# Patient Record
Sex: MALE | Race: ASIAN | NOT HISPANIC OR LATINO | Employment: OTHER | URBAN - METROPOLITAN AREA
[De-identification: names, ages, dates, MRNs, and addresses within clinical notes are randomized per-mention and may not be internally consistent; named-entity substitution may affect disease eponyms.]

---

## 2017-02-09 LAB
LEFT EYE DM RETINOPATHY: NEGATIVE
RIGHT EYE DM RETINOPATHY: POSITIVE

## 2017-08-02 ENCOUNTER — OFFICE VISIT (OUTPATIENT)
Dept: OTOLARYNGOLOGY | Facility: CLINIC | Age: 67
End: 2017-08-02
Payer: MEDICARE

## 2017-08-02 ENCOUNTER — CLINICAL SUPPORT (OUTPATIENT)
Dept: AUDIOLOGY | Facility: CLINIC | Age: 67
End: 2017-08-02
Payer: MEDICARE

## 2017-08-02 VITALS
WEIGHT: 166.44 LBS | SYSTOLIC BLOOD PRESSURE: 160 MMHG | HEART RATE: 60 BPM | TEMPERATURE: 97 F | HEIGHT: 68 IN | DIASTOLIC BLOOD PRESSURE: 91 MMHG | BODY MASS INDEX: 25.23 KG/M2

## 2017-08-02 DIAGNOSIS — H92.03 EAR DISCOMFORT, BILATERAL: ICD-10-CM

## 2017-08-02 DIAGNOSIS — H90.3 SENSORINEURAL HEARING LOSS, BILATERAL: Primary | ICD-10-CM

## 2017-08-02 DIAGNOSIS — H90.3 SENSORINEURAL HEARING LOSS, BILATERAL: ICD-10-CM

## 2017-08-02 DIAGNOSIS — Z87.898 HISTORY OF SNORING: ICD-10-CM

## 2017-08-02 DIAGNOSIS — Z77.122 HISTORY OF EXPOSURE TO NOISE: ICD-10-CM

## 2017-08-02 DIAGNOSIS — R09.82 POST-NASAL DRIP: ICD-10-CM

## 2017-08-02 DIAGNOSIS — R42 DIZZINESS: Primary | ICD-10-CM

## 2017-08-02 DIAGNOSIS — R42 VERTIGO: ICD-10-CM

## 2017-08-02 DIAGNOSIS — Z87.19 HISTORY OF GASTROESOPHAGEAL REFLUX (GERD): ICD-10-CM

## 2017-08-02 PROCEDURE — 99203 OFFICE O/P NEW LOW 30 MIN: CPT | Mod: S$GLB,,, | Performed by: OTOLARYNGOLOGY

## 2017-08-02 PROCEDURE — 99999 PR PBB SHADOW E&M-EST. PATIENT-LVL I: CPT | Mod: PBBFAC,,,

## 2017-08-02 PROCEDURE — 92557 COMPREHENSIVE HEARING TEST: CPT | Mod: S$GLB,,, | Performed by: AUDIOLOGIST

## 2017-08-02 PROCEDURE — 99999 PR PBB SHADOW E&M-EST. PATIENT-LVL III: CPT | Mod: PBBFAC,,, | Performed by: OTOLARYNGOLOGY

## 2017-08-02 PROCEDURE — 92550 TYMPANOMETRY & REFLEX THRESH: CPT | Mod: S$GLB,,, | Performed by: AUDIOLOGIST

## 2017-08-02 RX ORDER — LEVOCETIRIZINE DIHYDROCHLORIDE 5 MG/1
TABLET, FILM COATED ORAL
COMMUNITY
Start: 2017-06-20 | End: 2024-02-16

## 2017-08-02 RX ORDER — METFORMIN HYDROCHLORIDE 1000 MG/1
TABLET ORAL
COMMUNITY
Start: 2017-06-20 | End: 2023-04-30 | Stop reason: SDUPTHER

## 2017-08-02 NOTE — PATIENT INSTRUCTIONS
Trial of Suleman Med rinse kit with distilled water encouraged  Anti GERD literature provided  Continue use of Singulair and Flonase NSS; 1-2 sprays @ lateral nostril once a day  Hydration, plain Mucinex twice a day may help thin secretions  Audiometry reviewed: signifcnat high frequency SNHL  Pt. is a candidate for hearing amplification for one or both ears   copy of audiogram/YUMIKO Holman's card provided  Pt. will schedule for complete VNG scheduled at convenience; vertigo workup literature provided  Office endoscopy on return prn  Consider sleep study;  r/o apnea pending course
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
## 2017-08-10 LAB
LEFT EYE DM RETINOPATHY: NEGATIVE
RIGHT EYE DM RETINOPATHY: POSITIVE

## 2017-09-25 ENCOUNTER — CLINICAL SUPPORT (OUTPATIENT)
Dept: AUDIOLOGY | Facility: CLINIC | Age: 67
End: 2017-09-25
Payer: MEDICARE

## 2017-09-25 ENCOUNTER — OFFICE VISIT (OUTPATIENT)
Dept: OTOLARYNGOLOGY | Facility: CLINIC | Age: 67
End: 2017-09-25
Payer: MEDICARE

## 2017-09-25 VITALS — TEMPERATURE: 96 F | HEART RATE: 57 BPM | DIASTOLIC BLOOD PRESSURE: 94 MMHG | SYSTOLIC BLOOD PRESSURE: 153 MMHG

## 2017-09-25 DIAGNOSIS — Z87.09 HISTORY OF CHRONIC SINUSITIS: ICD-10-CM

## 2017-09-25 DIAGNOSIS — Z87.898 HISTORY OF SNORING: ICD-10-CM

## 2017-09-25 DIAGNOSIS — Z87.898 HISTORY OF DIZZINESS: Primary | ICD-10-CM

## 2017-09-25 DIAGNOSIS — H81.8X9 OTHER DISORDERS OF VESTIBULAR FUNCTION, UNSPECIFIED EAR: Primary | ICD-10-CM

## 2017-09-25 PROCEDURE — 99213 OFFICE O/P EST LOW 20 MIN: CPT | Mod: S$GLB,,, | Performed by: OTOLARYNGOLOGY

## 2017-09-25 PROCEDURE — 92540 BASIC VESTIBULAR EVALUATION: CPT | Mod: S$GLB,,, | Performed by: AUDIOLOGIST-HEARING AID FITTER

## 2017-09-25 PROCEDURE — 3008F BODY MASS INDEX DOCD: CPT | Mod: S$GLB,,, | Performed by: OTOLARYNGOLOGY

## 2017-09-25 PROCEDURE — 99999 PR PBB SHADOW E&M-EST. PATIENT-LVL III: CPT | Mod: PBBFAC,,, | Performed by: OTOLARYNGOLOGY

## 2017-09-25 PROCEDURE — 92537 CALORIC VSTBLR TEST W/REC: CPT | Mod: S$GLB,,, | Performed by: AUDIOLOGIST-HEARING AID FITTER

## 2017-09-25 NOTE — PATIENT INSTRUCTIONS
VNG results reviewed; WNL; Cawthorne head exercises may help  Water suctioned from eacs  I recommend sinus surgical consultation wtih , BINA Lucas ( or Dr. Minh Ray)  I encourage sleep medicine evaluation/sleep study; [phone]  [place] questionnaire  to be completed

## 2017-09-25 NOTE — PROGRESS NOTES
VNG/Postuography Evaluation    Referring physician:  Dr. hCou    66 y.o. male complains of vertigo, lightheadedness, imbalance, nausea, headache and ringing/cracking in the left ear.  Symptoms are provoked by bending over and looking up and have been recurring over the past several years. Mr. Grant reported his most recent episode occurred about 2 months ago and his episodes typically last 3-5 minutes.    Gaze nystagmus was absent.    Oculomotor function was abnormal for left saccadic accuracies only.    Spontaneous nystagmus was absent    The head-hanging left Hallpike was negative.    The head-hanging right Hallpike was negative.    Static positional nystagmus was absent.    Unilateral weakness: 11% (left)  Directional preponderance 9% (right beating)    RC: 29 d/s   d/s  RW: 47 d/s  LW: 33 d/s    Fixation suppression was normal.    Impression: Normal VNG; no evidence of vestibular system dysfunction including BPPV. The significance of only abnormal left saccadic accuracies is unknown.    Recommendations: Trial period with Cawthorne exercises. Mr. Grant was given a copy of these to try at home.

## 2017-09-25 NOTE — PROGRESS NOTES
"CC: Status post V NG testing  HPI:Mr. Grant is a 66-year-old [place] gentleman who has just completed complete VNG testing performed by GEE Newman in the lab.  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    The patient had complained of vertigo, lightheadedness and imbalance with nausea headache and cracking in his left ear during his initial evaluation with me 8/2/17 among other multiple ENT related complaints. His most recent episode occurred about 2 months ago with episodes lasting about 3-5 minutes.  He parenthetically complained of snoring.   He believes there is a correlation between his snoring and hypertension. He is possibly referring to  sleep apnea and sleep disordered breathing symptoms which has never been investigated.  He also initially complained of a "headlight" sensation preceding the dizziness symptoms.  He indicated ataxia when walking.    He described choking episodes in the middle of the night.  He is forced to clear his throat due to excess mucus there.  He relates sinus drainage down the back of his throat and possibly into his ears.  He indicated a sensation of mucus filling his ears i.e. a warmer feeling there at times.    He reminds me that he was scheduled [place] [facility] [place] ; the [place] was aborted due to the patient's high blood pressure at the time.    Audiometry was completed 8/2/17 and the study results are duplicated below.    PE:Blood pressure 153/94 pulse 57 temperature 95.6  Gen.: Alert and oriented gentleman in no acute distress  Both ears were examined under the microscope in the micro-procedure room.  Some loose skin is suctioned for the right ear canal.  The right eardrum is intact and clear as visualized  Some water suctioned from the deep left ear canal.  Left eardrum is intact and clear as visualized    The V NG study results are reviewed with the patient.  There was no evidence of gaze or " spontaneous nystagmus recorded.  Ocular motor function was abnormal for the left psychotic accuracies only.  Fixation suppression was normal.  Both  head hanging left and head hanging right Hallpike maneuvers were negative.  Caloric testing was within normal limits.  Impression: Normal VNG; no evidence of a stenosis of dysfunction including BPPV.    DIAGNOSIS:     ICD-10-CM ICD-9-CM    1. History of dizziness Z87.898 V13.89    2. History of snoring Z87.898 V15.89    3. History of chronic sinusitis Z87.09 V12.69     scheduled for sinus surgery( Prabhakar) ; aborted due to high blood pressure     PLAN: VNG results reviewed; WNL; Cawthorne head exercises may help; instruction sheet provided  Water suctioned from eacs  I encourage sleep medicine evaluation/sleep study; [phone]  I recommend sinus surgical consultation wt BINA Toney ( or Dr. Ray) pending course  ESS ( [place] questionnaire) to be completed

## 2018-02-09 LAB
LEFT EYE DM RETINOPATHY: POSITIVE
RIGHT EYE DM RETINOPATHY: NEGATIVE

## 2018-08-09 LAB
LEFT EYE DM RETINOPATHY: NEGATIVE
RIGHT EYE DM RETINOPATHY: POSITIVE

## 2019-05-26 ENCOUNTER — HOSPITAL ENCOUNTER (EMERGENCY)
Facility: HOSPITAL | Age: 69
Discharge: LEFT BEFORE DISCHARGE | End: 2019-05-26
Attending: EMERGENCY MEDICINE
Payer: COMMERCIAL

## 2019-05-26 VITALS
HEART RATE: 73 BPM | HEIGHT: 68 IN | RESPIRATION RATE: 20 BRPM | WEIGHT: 150 LBS | BODY MASS INDEX: 22.73 KG/M2 | TEMPERATURE: 98 F | SYSTOLIC BLOOD PRESSURE: 175 MMHG | DIASTOLIC BLOOD PRESSURE: 106 MMHG | OXYGEN SATURATION: 95 %

## 2019-05-26 DIAGNOSIS — R05.9 COUGH: ICD-10-CM

## 2019-05-26 PROCEDURE — 99283 EMERGENCY DEPT VISIT LOW MDM: CPT | Mod: 25,ER

## 2019-05-26 RX ORDER — BENZONATATE 100 MG/1
100 CAPSULE ORAL 3 TIMES DAILY PRN
Qty: 20 CAPSULE | Refills: 0 | Status: SHIPPED | OUTPATIENT
Start: 2019-05-26 | End: 2019-06-05

## 2019-05-26 RX ORDER — AZITHROMYCIN 250 MG/1
250 TABLET, FILM COATED ORAL DAILY
Qty: 6 TABLET | Refills: 0 | Status: SHIPPED | OUTPATIENT
Start: 2019-05-26 | End: 2021-10-08

## 2019-05-26 NOTE — ED PROVIDER NOTES
Encounter Date: 5/26/2019    SCRIBE #1 NOTE: I, Latricia Bernal, am scribing for, and in the presence of,  ANGELITO Akers. I have scribed the following portions of the note - Other sections scribed: HPI, ROS, PE.       History     Chief Complaint   Patient presents with    URI     onset 2 weeks    Nasal Congestion    Cough     This is a 68 year old male complaining of productive cough x 2 weeks. Mucous is described as a brownish color. Patient is experiencing additional symptoms of nasal congestion and intermittent fever. He denies chest pain, change in appetite, vomiting, or nausea. Reports taking Nyquil and Advil cold and sinus with no relief.    The history is provided by the patient.     Review of patient's allergies indicates:   Allergen Reactions    Farxiga [dapagliflozin]     Pcn [penicillins]     Lisinopril Other (See Comments)     cough     Past Medical History:   Diagnosis Date    Diabetes mellitus     Hypertension     Stroke      Past Surgical History:   Procedure Laterality Date    CAUTERIZATION OF TURBINATES N/A 3/1/2016    [facility]    EXCISION-SINUS, MAXILLARY, FRONTAL, ETHMOID N/A 3/1/2016    Performed [facility] at [facility]    RECONSTRUCTION-NASAL N/A 3/1/2016    [facility]    SHOULDER SURGERY Right     SINUS SURGERY FUNCTIONAL ENDOSCOPIC WITH NAVIGATION N/A 3/1/2016    Performed by Rex [facility] at [facility] OR     History reviewed. No pertinent family history.  Social History     Tobacco Use    Smoking status: Never Smoker    Smokeless tobacco: Never Used   Substance Use Topics    Alcohol use: No    Drug use: Not on file     Review of Systems   Constitutional: Positive for fever. Negative for appetite change.   HENT: Positive for congestion.    Eyes: Negative for redness.   Respiratory: Positive for choking.    Cardiovascular: Negative for chest pain.   Gastrointestinal: Negative for abdominal pain,  diarrhea, nausea and vomiting.   Skin: Negative for rash and wound.   All other systems reviewed and are negative.      Physical Exam     Initial Vitals [05/26/19 1733]   BP Pulse Resp Temp SpO2   (!) 175/106 73 20 98.3 °F (36.8 °C) 95 %      MAP       --         Physical Exam    Nursing note and vitals reviewed.  Constitutional: He appears well-developed and well-nourished.   HENT:   Head: Normocephalic and atraumatic.   Right Ear: Tympanic membrane and external ear normal.   Left Ear: Tympanic membrane and external ear normal.   Mouth/Throat: Oropharynx is clear and moist and mucous membranes are normal.   Eyes: Conjunctivae are normal.   Neck: Neck supple.   Cardiovascular: Normal rate and regular rhythm. Exam reveals no gallop and no friction rub.    No murmur heard.  Pulmonary/Chest: Breath sounds normal. No respiratory distress.   Abdominal: Soft. There is no tenderness.   Musculoskeletal: Normal range of motion.   Neurological: He is alert and oriented to person, place, and time.   Skin: Skin is warm and dry.   Psychiatric: He has a normal mood and affect.         ED Course   Procedures  Labs Reviewed - No data to display       Imaging Results          X-Ray Chest PA And Lateral (In process)                  Medical Decision Making:   Initial Assessment:   Patient has symptoms consistent with a viral URI. Lungs clear to auscultation on exam, CXR normal. I doubt pneumonia. Patient appears well hydrated and nontoxic clinically. Vitals stable.  No nuchal rigidity, nausea, vomiting, photophobia, or headache, therefore I doubt meningitis at this time. Will d/c home with viral uri instructions, zpak, and tessalon perles.            Scribe Attestation:   Scribe #1: I performed the above scribed service and the documentation accurately describes the services I performed. I attest to the accuracy of the note.    Kathleen Landeros           Clinical Impression:     1. Cough            Disposition:   Disposition:  Discharged  Condition: Stable                        ANGELITO Eng  05/29/19 0708

## 2019-08-17 ENCOUNTER — HOSPITAL ENCOUNTER (EMERGENCY)
Facility: HOSPITAL | Age: 69
Discharge: HOME | End: 2019-08-17
Attending: EMERGENCY MEDICINE
Payer: COMMERCIAL

## 2019-08-17 VITALS
SYSTOLIC BLOOD PRESSURE: 149 MMHG | OXYGEN SATURATION: 100 % | TEMPERATURE: 98 F | WEIGHT: 150 LBS | HEIGHT: 68 IN | HEART RATE: 59 BPM | DIASTOLIC BLOOD PRESSURE: 72 MMHG | RESPIRATION RATE: 18 BRPM | BODY MASS INDEX: 22.73 KG/M2

## 2019-08-17 DIAGNOSIS — M25.519 SHOULDER PAIN: ICD-10-CM

## 2019-08-17 DIAGNOSIS — N17.9 AKI (ACUTE KIDNEY INJURY): ICD-10-CM

## 2019-08-17 DIAGNOSIS — T67.2XXA HEAT CRAMPS, INITIAL ENCOUNTER: ICD-10-CM

## 2019-08-17 DIAGNOSIS — E86.0 DEHYDRATION: ICD-10-CM

## 2019-08-17 DIAGNOSIS — T67.9XXA HEAT EXPOSURE, INITIAL ENCOUNTER: Primary | ICD-10-CM

## 2019-08-17 LAB
ALBUMIN SERPL-MCNC: 3.1 G/DL (ref 3.3–5.5)
ALBUMIN SERPL-MCNC: 3.2 G/DL (ref 3.3–5.5)
ALBUMIN SERPL-MCNC: 3.7 G/DL (ref 3.3–5.5)
ALLENS TEST: ABNORMAL
ALLENS TEST: ABNORMAL
ALP SERPL-CCNC: 36 U/L (ref 42–141)
ALP SERPL-CCNC: 43 U/L (ref 42–141)
ALP SERPL-CCNC: 48 U/L (ref 42–141)
BILIRUB SERPL-MCNC: 0.4 MG/DL (ref 0.2–1.6)
BILIRUB SERPL-MCNC: 0.6 MG/DL (ref 0.2–1.6)
BILIRUB SERPL-MCNC: 0.8 MG/DL (ref 0.2–1.6)
BILIRUBIN, POC UA: NEGATIVE
BLOOD, POC UA: ABNORMAL
BUN SERPL-MCNC: 24 MG/DL (ref 7–22)
BUN SERPL-MCNC: 28 MG/DL (ref 7–22)
BUN SERPL-MCNC: 30 MG/DL (ref 7–22)
CALCIUM SERPL-MCNC: 10 MG/DL (ref 8–10.3)
CALCIUM SERPL-MCNC: 8.2 MG/DL (ref 8–10.3)
CALCIUM SERPL-MCNC: 8.3 MG/DL (ref 8–10.3)
CHLORIDE SERPL-SCNC: 106 MMOL/L (ref 98–108)
CHLORIDE SERPL-SCNC: 108 MMOL/L (ref 98–108)
CHLORIDE SERPL-SCNC: 111 MMOL/L (ref 98–108)
CK SERPL-CCNC: 99 U/L (ref 20–200)
CLARITY, POC UA: CLEAR
COLOR, POC UA: YELLOW
CREAT SERPL-MCNC: 1.3 MG/DL (ref 0.6–1.2)
CREAT SERPL-MCNC: 1.4 MG/DL (ref 0.6–1.2)
CREAT SERPL-MCNC: 1.6 MG/DL (ref 0.6–1.2)
GLUCOSE SERPL-MCNC: 156 MG/DL (ref 73–118)
GLUCOSE SERPL-MCNC: 179 MG/DL (ref 73–118)
GLUCOSE SERPL-MCNC: 186 MG/DL (ref 73–118)
GLUCOSE, POC UA: NEGATIVE
HCO3 UR-SCNC: 23.2 MMOL/L (ref 24–28)
HCO3 UR-SCNC: 24.2 MMOL/L (ref 24–28)
KETONES, POC UA: NEGATIVE
LDH SERPL L TO P-CCNC: 1.63 MMOL/L (ref 0.5–2.2)
LDH SERPL L TO P-CCNC: 1.74 MMOL/L (ref 0.5–2.2)
LEUKOCYTE EST, POC UA: NEGATIVE
NITRITE, POC UA: NEGATIVE
PCO2 BLDA: 44.9 MMHG (ref 35–45)
PCO2 BLDA: 49.3 MMHG (ref 35–45)
PH SMN: 7.3 [PH] (ref 7.35–7.45)
PH SMN: 7.32 [PH] (ref 7.35–7.45)
PH UR STRIP: 5 [PH]
PO2 BLDA: 29 MMHG (ref 40–60)
PO2 BLDA: 30 MMHG (ref 40–60)
POC ALT (SGPT): 21 U/L (ref 10–47)
POC ALT (SGPT): 27 U/L (ref 10–47)
POC ALT (SGPT): 27 U/L (ref 10–47)
POC AST (SGOT): 22 U/L (ref 11–38)
POC AST (SGOT): 26 U/L (ref 11–38)
POC AST (SGOT): 29 U/L (ref 11–38)
POC B-TYPE NATRIURETIC PEPTIDE: 150 PG/ML (ref 0–100)
POC BE: -3 MMOL/L
POC BE: -3 MMOL/L
POC CARDIAC TROPONIN I: 0.01 NG/ML
POC PTINR: 1.1 (ref 0.9–1.2)
POC PTWBT: 13.2 SEC (ref 9.7–14.3)
POC SATURATED O2: 49 % (ref 95–100)
POC SATURATED O2: 50 % (ref 95–100)
POC TCO2: 24 MMOL/L (ref 18–33)
POC TCO2: 25 MMOL/L (ref 18–33)
POC TCO2: 25 MMOL/L (ref 24–29)
POC TCO2: 26 MMOL/L (ref 24–29)
POC TCO2: 27 MMOL/L (ref 18–33)
POCT GLUCOSE: 139 MG/DL (ref 70–110)
POTASSIUM BLD-SCNC: 4.1 MMOL/L (ref 3.6–5.1)
POTASSIUM BLD-SCNC: 4.3 MMOL/L (ref 3.6–5.1)
POTASSIUM BLD-SCNC: 4.3 MMOL/L (ref 3.6–5.1)
PROTEIN, POC UA: NEGATIVE
PROTEIN, POC: 5.9 G/DL (ref 6.4–8.1)
PROTEIN, POC: 6 G/DL (ref 6.4–8.1)
PROTEIN, POC: 7.4 G/DL (ref 6.4–8.1)
SAMPLE: ABNORMAL
SAMPLE: ABNORMAL
SAMPLE: NORMAL
SAMPLE: NORMAL
SITE: ABNORMAL
SITE: ABNORMAL
SODIUM BLD-SCNC: 137 MMOL/L (ref 128–145)
SODIUM BLD-SCNC: 140 MMOL/L (ref 128–145)
SODIUM BLD-SCNC: 140 MMOL/L (ref 128–145)
SPECIFIC GRAVITY, POC UA: 1.01
UROBILINOGEN, POC UA: 0.2 E.U./DL

## 2019-08-17 PROCEDURE — 96374 THER/PROPH/DIAG INJ IV PUSH: CPT | Mod: ER

## 2019-08-17 PROCEDURE — 99285 EMERGENCY DEPT VISIT HI MDM: CPT | Mod: 25,ER

## 2019-08-17 PROCEDURE — 85025 COMPLETE CBC W/AUTO DIFF WBC: CPT | Mod: ER

## 2019-08-17 PROCEDURE — 93010 EKG 12-LEAD: ICD-10-PCS | Mod: ,,, | Performed by: INTERNAL MEDICINE

## 2019-08-17 PROCEDURE — 83880 ASSAY OF NATRIURETIC PEPTIDE: CPT | Mod: ER

## 2019-08-17 PROCEDURE — 63600175 PHARM REV CODE 636 W HCPCS: Mod: ER | Performed by: EMERGENCY MEDICINE

## 2019-08-17 PROCEDURE — 80053 COMPREHEN METABOLIC PANEL: CPT | Mod: ER,91

## 2019-08-17 PROCEDURE — 93005 ELECTROCARDIOGRAM TRACING: CPT | Mod: ER

## 2019-08-17 PROCEDURE — 93010 ELECTROCARDIOGRAM REPORT: CPT | Mod: ,,, | Performed by: INTERNAL MEDICINE

## 2019-08-17 PROCEDURE — 84484 ASSAY OF TROPONIN QUANT: CPT | Mod: ER

## 2019-08-17 PROCEDURE — 85610 PROTHROMBIN TIME: CPT | Mod: ER

## 2019-08-17 PROCEDURE — 96361 HYDRATE IV INFUSION ADD-ON: CPT | Mod: ER

## 2019-08-17 PROCEDURE — 82803 BLOOD GASES ANY COMBINATION: CPT | Mod: ER

## 2019-08-17 PROCEDURE — 81003 URINALYSIS AUTO W/O SCOPE: CPT | Mod: ER

## 2019-08-17 PROCEDURE — 82550 ASSAY OF CK (CPK): CPT

## 2019-08-17 RX ORDER — KETOROLAC TROMETHAMINE 30 MG/ML
15 INJECTION, SOLUTION INTRAMUSCULAR; INTRAVENOUS
Status: COMPLETED | OUTPATIENT
Start: 2019-08-17 | End: 2019-08-17

## 2019-08-17 RX ORDER — CYCLOBENZAPRINE HCL 10 MG
10 TABLET ORAL 3 TIMES DAILY PRN
Qty: 15 TABLET | Refills: 0 | Status: SHIPPED | OUTPATIENT
Start: 2019-08-17 | End: 2019-08-22

## 2019-08-17 RX ORDER — ACETAMINOPHEN 500 MG
1000 TABLET ORAL EVERY 6 HOURS PRN
Qty: 30 TABLET | Refills: 0 | OUTPATIENT
Start: 2019-08-17 | End: 2023-07-07

## 2019-08-17 RX ORDER — ASPIRIN 325 MG
325 TABLET ORAL
Status: DISCONTINUED | OUTPATIENT
Start: 2019-08-17 | End: 2019-08-17 | Stop reason: HOSPADM

## 2019-08-17 RX ADMIN — SODIUM CHLORIDE 1000 ML: 0.9 INJECTION, SOLUTION INTRAVENOUS at 04:08

## 2019-08-17 RX ADMIN — SODIUM CHLORIDE 1000 ML: 0.9 INJECTION, SOLUTION INTRAVENOUS at 02:08

## 2019-08-17 RX ADMIN — KETOROLAC TROMETHAMINE 15 MG: 30 INJECTION, SOLUTION INTRAMUSCULAR at 01:08

## 2019-08-17 NOTE — ED PROVIDER NOTES
Encounter Date: 8/17/2019    SCRIBE #1 NOTE: I, Tmamy Lua, am scribing for, and in the presence of,  Dr. Reid. I have scribed the following portions of the note - Other sections scribed: HPI, ROS, PE.       History     Chief Complaint   Patient presents with    Arm Pain     bilateral arm pain, increasing over past several days, left arm more than the right, increased with ROM, works in the heat, denies other Sx     Emmaunel Grant is a 68 y.o. male who presents to the ED complaining of worsening bilateral UE pain x 5 days, with pain to left arm worse than right.  Patient states he has been working in the heat his left shoulder started hurting with pain radiating down the left arm.  The pain began in his left shoulder and radiated down his left arm. Right arm pain does not radiate. Pt also endorses muscle cramps in left leg. Pain is exacerbated by movement and activity. Pt has DM and works outside as an .  Patient feels like he has been getting very dehydrated the last few days.  Muscle cramps are getting worse.    The history is provided by the patient. No  was used.     Review of patient's allergies indicates:   Allergen Reactions    Farxiga [dapagliflozin]     Pcn [penicillins]     Lisinopril Other (See Comments)     cough     Past Medical History:   Diagnosis Date    Diabetes mellitus     Hypertension     Stroke      Past Surgical History:   Procedure Laterality Date    CAUTERIZATION OF TURBINATES N/A 3/1/2016    [facility]    EXCISION-SINUS, MAXILLARY, FRONTAL, ETHMOID N/A 3/1/2016    [facility]    RECONSTRUCTION-NASAL N/A 3/1/2016    [facility]    SHOULDER SURGERY Right     SINUS SURGERY FUNCTIONAL ENDOSCOPIC WITH NAVIGATION N/A 3/1/2016    Per[facility] at [facility] OR     History reviewed. No pertinent family history.  Social History     Tobacco  Use    Smoking status: Never Smoker    Smokeless tobacco: Never Used   Substance Use Topics    Alcohol use: No    Drug use: Not on file     Review of Systems   Constitutional: Negative for fever.   HENT: Negative for sore throat.    Respiratory: Negative for shortness of breath.    Cardiovascular: Negative for chest pain.   Gastrointestinal: Negative for nausea.   Endocrine: Positive for heat intolerance.   Genitourinary: Negative for dysuria.   Musculoskeletal: Positive for myalgias. Negative for back pain.   Skin: Negative for rash.   Neurological: Negative for weakness.   Hematological: Does not bruise/bleed easily.   All other systems reviewed and are negative.      Physical Exam     Initial Vitals [08/17/19 1310]   BP Pulse Resp Temp SpO2   116/77 81 19 98.5 °F (36.9 °C) 96 %      MAP       --         Patient gave consent to have physical exam performed.    Physical Exam    Nursing note and vitals reviewed.  Constitutional: He appears well-developed and well-nourished.   HENT:   Head: Normocephalic and atraumatic.   Right Ear: External ear normal.   Left Ear: External ear normal.   Nose: Nose normal.   Mouth/Throat: Oropharynx is clear and moist.   Eyes: Conjunctivae and EOM are normal. Pupils are equal, round, and reactive to light.   Neck: Normal range of motion. Neck supple.   Cardiovascular: Normal rate, regular rhythm, normal heart sounds and intact distal pulses. Exam reveals no gallop and no friction rub.    No murmur heard.  Pulmonary/Chest: Breath sounds normal. No stridor. No respiratory distress. He has no wheezes. He has no rhonchi. He has no rales. He exhibits no tenderness.   Abdominal: Soft. Bowel sounds are normal. He exhibits no distension and no mass. There is no tenderness. There is no rigidity, no rebound and no guarding.   Musculoskeletal: Normal range of motion. He exhibits no edema or tenderness.   Neurological: He is alert and oriented to person, place, and time. No cranial nerve  deficit or sensory deficit. GCS score is 15. GCS eye subscore is 4. GCS verbal subscore is 5. GCS motor subscore is 6.   Skin: Skin is warm and dry. Capillary refill takes less than 2 seconds. No rash noted.   Psychiatric: He has a normal mood and affect. His behavior is normal.         ED Course   Critical Care  Date/Time: 8/17/2019 5:34 PM  Performed by: Ivette Reid DO  Authorized by: Ivette Reid DO   Direct patient critical care time: 10 minutes  Additional history critical care time: 10 minutes  Ordering / reviewing critical care time: 10 minutes  Documentation critical care time: 10 minutes  Total critical care time (exclusive of procedural time) : 40 minutes  Critical care was necessary to treat or prevent imminent or life-threatening deterioration of the following conditions: renal failure and dehydration.  Critical care was time spent personally by me on the following activities: evaluation of patient's response to treatment, obtaining history from patient or surrogate, ordering and review of laboratory studies, pulse oximetry, review of old charts, examination of patient, ordering and performing treatments and interventions, ordering and review of radiographic studies, re-evaluation of patient's condition and transcutaneous pacing.        Labs Reviewed   POCT URINALYSIS W/O SCOPE - Abnormal; Notable for the following components:       Result Value    Glucose, UA Negative (*)     Bilirubin, UA Negative (*)     Ketones, UA Negative (*)     Blood, UA 3+ (*)     Protein, UA Negative (*)     Nitrite, UA Negative (*)     Leukocytes, UA Negative (*)     All other components within normal limits   POCT GLUCOSE - Abnormal; Notable for the following components:    POCT Glucose 139 (*)     All other components within normal limits   ISTAT PROCEDURE - Abnormal; Notable for the following components:    POC PH 7.299 (*)     POC PCO2 49.3 (*)     POC PO2 30 (*)     POC SATURATED O2 50 (*)     All other components within  normal limits   POCT CMP - Abnormal; Notable for the following components:    POC BUN 30 (*)     POC Creatinine 1.6 (*)     POC Glucose 156 (*)     All other components within normal limits   POCT B-TYPE NATRIURETIC PEPTIDE (BNP) - Abnormal; Notable for the following components:    POC B-Type Natriuretic Peptide 150 (*)     All other components within normal limits   POCT CMP - Abnormal; Notable for the following components:    Alkaline Phosphatase, POC 36 (*)     POC BUN 28 (*)     POC Creatinine 1.4 (*)     POC Glucose 186 (*)     Protein 5.9 (*)     All other components within normal limits   TROPONIN ISTAT   CK   POCT CBC   POCT URINALYSIS W/O SCOPE   POCT GLUCOSE, HAND-HELD DEVICE   POCT CMP   POCT PROTIME-INR   POCT TROPONIN   POCT B-TYPE NATRIURETIC PEPTIDE (BNP)   ISTAT PROCEDURE   POCT CMP     EKG Readings: (Independently Interpreted)   Initial Reading: No STEMI. Rhythm: Normal Sinus Rhythm. Heart Rate: 76. Axis: Normal. Other Impression: This is an abnormal EKG with QTc 411. Nonspecicfc T wave abnormality. No prior EKG for comparison.       Imaging Results          X-Ray Chest PA And Lateral (Final result)  Result time 08/17/19 13:48:33    Final result by Johnnie Cool MD (08/17/19 13:48:33)                 Impression:      No detrimental change or radiographic acute intrathoracic process seen.      Electronically signed by: Johnnie Cool MD  Date:    08/17/2019  Time:    13:48             Narrative:    EXAMINATION:  XR CHEST PA AND LATERAL    CLINICAL HISTORY:  Hypertension;    TECHNIQUE:  PA and lateral views of the chest were performed.    COMPARISON:  Chest radiograph 05/26/2019    FINDINGS:  No detrimental change.  Cardiomediastinal silhouette is midline and within normal limits for age.  Pulmonary vasculature and hilar regions are within normal limits.  Few scattered linear opacities consistent with subsegmental scarring versus atelectasis.  The lungs are otherwise well expanded without large  consolidation, pleural effusion or pneumothorax.  Included osseous structures appear grossly stable without acute or destructive process seen.                                 Medical Decision Making:   History:   Old Medical Records: I decided to obtain old medical records.  Clinical Tests:   Lab Tests: Ordered and Reviewed  Radiological Study: Ordered and Reviewed  Medical Tests: Ordered and Reviewed  ED Management:  Will order UA, glucose, CBC, CMP, BNP, Protime-INR, Troponin, X-Ray Chest PA and Lateral, EKG 12-lead. Will order VBG, vital signs, cardiac monitoring, pulse oximetry continuous, diet NPO.  Will treat with saline lock IV, aspirin tablet 325 mg, ketorolac injection 15 mg, sodium chloride 0.9% bolus 1,000 mL.      Chief complaint: pain to bilateral UE  Differential diagnosis:  Treatment in the ED: saline lock IV, aspirin tablet 325 mg, ketorolac injection 15 mg, sodium chloride 0.9% bolus 1,000 mL.  No prior labs for comparison.  Initial BUN of 30 and creatinine 1.6.  Patient given 2 L normal saline repeat BUN 28 creatinine 1.4.  Awaiting CPK result.  Will give continue  normal saline infusion awaiting CPK result.  Repeat BUN and creatinine improved.  Patient does not want to wait in the emergency department any longer for results.  As patient is feeling better will discharge him home.  Patient is to return to the emergency room immediately if symptoms worsen or do not resolve.  Patient reports feeling better after treatment in the ER.    Discussed treatment, prescriptions, labs, and imaging results.  Fill and take prescriptions as directed.  Return to the ED if symptoms worsen or do not resolve.   Answered questions and discussed discharge plan.    Patient feels better and is ready for discharge.  Follow up with PCP/specialist in 1 day.            Scribe Attestation:   Scribe #1: I performed the above scribed service and the documentation accurately describes the services I performed. I attest to the  accuracy of the note.     I, Dr. Ivette Reid, personally performed the services described in this documentation. This document was produced by a scribe under my direction and in my presence. All medical record entries made by the scribe were at my direction and in my presence.  I have reviewed the chart and agree that the record reflects my personal performance and is accurate and complete. Ivette Reid DO.     08/17/2019 5:35 PM               Clinical Impression:     1. Heat exposure, initial encounter    2. Shoulder pain    3. Heat cramps, initial encounter    4. EKATERINA (acute kidney injury)    5. Dehydration                                   Ivette Reid DO  08/18/19 0753

## 2019-08-17 NOTE — ED NOTES
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

## 2019-08-17 NOTE — ED NOTES
Pt. Refused the 325 mg aspirin, he said the last time he was given a 325mg aspirin by his doctor he chewed it up and a few min's later he had a stroke on his lt. Side.  That was to  Much blood thinner.  Dr. Reid made aware of the refusal

## 2019-08-25 ENCOUNTER — HOSPITAL ENCOUNTER (EMERGENCY)
Facility: HOSPITAL | Age: 69
Discharge: HOME | End: 2019-08-25
Attending: EMERGENCY MEDICINE
Payer: COMMERCIAL

## 2019-08-25 VITALS
WEIGHT: 155 LBS | SYSTOLIC BLOOD PRESSURE: 159 MMHG | BODY MASS INDEX: 23.49 KG/M2 | HEART RATE: 69 BPM | TEMPERATURE: 99 F | RESPIRATION RATE: 16 BRPM | OXYGEN SATURATION: 99 % | HEIGHT: 68 IN | DIASTOLIC BLOOD PRESSURE: 93 MMHG

## 2019-08-25 DIAGNOSIS — R35.0 FREQUENCY OF URINATION: ICD-10-CM

## 2019-08-25 DIAGNOSIS — R30.0 DYSURIA: Primary | ICD-10-CM

## 2019-08-25 LAB
ALBUMIN SERPL-MCNC: 3.5 G/DL (ref 3.3–5.5)
ALP SERPL-CCNC: 41 U/L (ref 42–141)
BILIRUB SERPL-MCNC: 0.7 MG/DL (ref 0.2–1.6)
BILIRUBIN, POC UA: NEGATIVE
BLOOD, POC UA: ABNORMAL
BUN SERPL-MCNC: 25 MG/DL (ref 7–22)
CALCIUM SERPL-MCNC: 9.5 MG/DL (ref 8–10.3)
CHLORIDE SERPL-SCNC: 105 MMOL/L (ref 98–108)
CLARITY, POC UA: CLEAR
COLOR, POC UA: YELLOW
CREAT SERPL-MCNC: 1 MG/DL (ref 0.6–1.2)
GLUCOSE SERPL-MCNC: 244 MG/DL (ref 73–118)
GLUCOSE, POC UA: ABNORMAL
KETONES, POC UA: NEGATIVE
LEUKOCYTE EST, POC UA: NEGATIVE
NITRITE, POC UA: NEGATIVE
PH UR STRIP: 7 [PH]
POC ALT (SGPT): 25 U/L (ref 10–47)
POC AST (SGOT): 26 U/L (ref 11–38)
POC TCO2: 28 MMOL/L (ref 18–33)
POCT GLUCOSE: 298 MG/DL (ref 70–110)
POTASSIUM BLD-SCNC: 4 MMOL/L (ref 3.6–5.1)
PROTEIN, POC UA: NEGATIVE
PROTEIN, POC: 6.9 G/DL (ref 6.4–8.1)
SODIUM BLD-SCNC: 138 MMOL/L (ref 128–145)
SPECIFIC GRAVITY, POC UA: 1.02
UROBILINOGEN, POC UA: 0.2 E.U./DL

## 2019-08-25 PROCEDURE — 99284 EMERGENCY DEPT VISIT MOD MDM: CPT | Mod: ER

## 2019-08-25 PROCEDURE — 80053 COMPREHEN METABOLIC PANEL: CPT | Mod: ER

## 2019-08-25 PROCEDURE — 81003 URINALYSIS AUTO W/O SCOPE: CPT | Mod: ER

## 2019-08-25 PROCEDURE — 25000003 PHARM REV CODE 250: Mod: ER | Performed by: EMERGENCY MEDICINE

## 2019-08-25 RX ORDER — PHENAZOPYRIDINE HYDROCHLORIDE 200 MG/1
200 TABLET, FILM COATED ORAL 3 TIMES DAILY
Qty: 6 TABLET | Refills: 0 | Status: SHIPPED | OUTPATIENT
Start: 2019-08-25 | End: 2019-08-27

## 2019-08-25 RX ORDER — TAMSULOSIN HYDROCHLORIDE 0.4 MG/1
0.4 CAPSULE ORAL DAILY
Qty: 10 CAPSULE | Refills: 0 | Status: SHIPPED | OUTPATIENT
Start: 2019-08-25 | End: 2020-08-24

## 2019-08-25 RX ORDER — TAMSULOSIN HYDROCHLORIDE 0.4 MG/1
0.4 CAPSULE ORAL
Status: COMPLETED | OUTPATIENT
Start: 2019-08-25 | End: 2019-08-25

## 2019-08-25 RX ADMIN — TAMSULOSIN HYDROCHLORIDE 0.4 MG: 0.4 CAPSULE ORAL at 09:08

## 2019-08-25 NOTE — ED PROVIDER NOTES
Encounter Date: 8/25/2019    SCRIBE #1 NOTE: I, Mala Ceja, am scribing for, and in the presence of,  Dr. Paz. I have scribed the following portions of the note - Other sections scribed: HPI, ROS, PE.       History     Chief Complaint   Patient presents with    Male  Problem     pt reports dysuria, urinary frequency, difficulty initiating urine stream x's 3 days. Denies back/abdominal pain. Denies fever chills     68 year old male complains of dysuria x 3 days . Patients is experiencing dysuria, frequency/urgency. He denies nausea, vomiting, fever, hematuria, abdominal pain, or back pain. Patient reports passing kidney stone x 6 months ago with no complications. Patient has not had a recent prostate exam.         The history is provided by the patient. No  was used.     Review of patient's allergies indicates:   Allergen Reactions    Farxiga [dapagliflozin]     Pcn [penicillins]     Lisinopril Other (See Comments)     cough     Past Medical History:   Diagnosis Date    Diabetes mellitus     Hypertension     Kidney stone     Stroke      Past Surgical History:   Procedure Laterality Date    CAUTERIZATION OF TURBINATES N/A 3/1/2016    [facility]    EXCISION-SINUS, MAXILLARY, FRONTAL, ETHMOID N/A 3/1/2016    [facility]    RECONSTRUCTION-NASAL N/A 3/1/2016    [facility]    SHOULDER SURGERY Right     SINUS SURGERY FUNCTIONAL ENDOSCOPIC WITH NAVIGATION N/A 3/1/2016    Performed by [facility] at [facility]     History reviewed. No pertinent family history.  Social History     Tobacco Use    Smoking status: Never Smoker    Smokeless tobacco: Never Used   Substance Use Topics    Alcohol use: No    Drug use: Not on file     Review of Systems   Constitutional: Negative for activity change and fever.   Eyes: Negative for redness.   Gastrointestinal: Negative for abdominal pain, nausea  and vomiting.   Genitourinary: Positive for difficulty urinating, dysuria, frequency and urgency. Negative for flank pain, hematuria and penile pain.   Musculoskeletal: Negative for back pain and neck pain.   Skin: Negative for rash and wound.   Psychiatric/Behavioral: Negative for behavioral problems.       Physical Exam     Initial Vitals [08/25/19 0925]   BP Pulse Resp Temp SpO2   (!) 145/93 75 16 98.2 °F (36.8 °C) 98 %      MAP       --         Physical Exam    Nursing note and vitals reviewed.  Constitutional: He appears well-developed and well-nourished.   HENT:   Head: Normocephalic and atraumatic.   Right Ear: External ear normal.   Left Ear: External ear normal.   Eyes: Conjunctivae are normal.   Neck: Neck supple.   Cardiovascular: Normal rate, regular rhythm, normal heart sounds and intact distal pulses. Exam reveals no gallop and no friction rub.    No murmur heard.  Pulmonary/Chest: Breath sounds normal. No respiratory distress. He has no wheezes. He has no rhonchi. He has no rales. He exhibits no tenderness.   Abdominal: Soft. Normal appearance and bowel sounds are normal. He exhibits no distension and no mass. There is no tenderness. There is no rebound, no guarding and no CVA tenderness.   Neurological: He is alert and oriented to person, place, and time.   Skin: Skin is warm and dry.   Psychiatric: He has a normal mood and affect.         ED Course   Procedures  Labs Reviewed   POCT URINALYSIS W/O SCOPE - Abnormal; Notable for the following components:       Result Value    Glucose, UA Trace (*)     Bilirubin, UA Negative (*)     Ketones, UA Negative (*)     Blood, UA Trace-intact (*)     Protein, UA Negative (*)     Nitrite, UA Negative (*)     Leukocytes, UA Negative (*)     All other components within normal limits   POCT GLUCOSE - Abnormal; Notable for the following components:    POCT Glucose 298 (*)     All other components within normal limits   POCT CMP - Abnormal; Notable for the following  components:    Alkaline Phosphatase, POC 41 (*)     POC BUN 25 (*)     POC Glucose 244 (*)     All other components within normal limits   POCT URINALYSIS W/O SCOPE   POCT GLUCOSE, HAND-HELD DEVICE   POCT CMP          Imaging Results    None          Medical Decision Making:   History:   Old Medical Records: I decided to obtain old medical records.  Initial Assessment:   68 year old male complains of dysuria x 3 days. Patient is experiencing dysuria, frequency/urgency in urination. He denies fever, nausea, vomiting, blood in urine, abdominal pain, or back pain. Physical exam was not significant for any pertinent findings.   Clinical Tests:   Lab Tests: Ordered and Reviewed  ED Management:  Will order POCT CMP, POCT Urinalysis, POCT glucose, POCT Urinalysis w/o scope, POCT glucose, Hand Held Device.   Will treat with tamsulosin 24 hr capsule 0.4 mg.  No evidence of UTI.  Kidney function is normal. Patient will be referred to Urology.  I suspect BPH  Will discharge with Flomax 0.4 mg cap (daily) and Pyridium 200 mg tablet (3 times daily).            Scribe Attestation:   Scribe #1: I performed the above scribed service and the documentation accurately describes the services I performed. I attest to the accuracy of the note.       I, Dr. Danika Paz, personally performed the services described in this documentation. All medical record entries made by the scribe were at my direction and in my presence.  I have reviewed the chart and agree that the record reflects my personal performance and is accurate and complete. Danika Paz MD.  11:58 AM 08/25/2019             Clinical Impression:     1. Dysuria    2. Frequency of urination                                   Danika Paz MD  08/25/19 1158       Danika Paz MD  08/25/19 1158       Danika Paz MD  08/25/19 115

## 2021-01-15 LAB
LEFT EYE DM RETINOPATHY: NEGATIVE
RIGHT EYE DM RETINOPATHY: POSITIVE

## 2021-10-07 ENCOUNTER — TELEPHONE (OUTPATIENT)
Dept: ENDOCRINOLOGY | Facility: CLINIC | Age: 71
End: 2021-10-07

## 2021-10-08 ENCOUNTER — HOSPITAL ENCOUNTER (OUTPATIENT)
Dept: RADIOLOGY | Facility: HOSPITAL | Age: 71
Discharge: HOME | End: 2021-10-08
Attending: HOSPITALIST
Payer: MEDICARE

## 2021-10-08 ENCOUNTER — OFFICE VISIT (OUTPATIENT)
Dept: ENDOCRINOLOGY | Facility: CLINIC | Age: 71
End: 2021-10-08
Payer: MEDICARE

## 2021-10-08 VITALS
HEART RATE: 76 BPM | DIASTOLIC BLOOD PRESSURE: 82 MMHG | TEMPERATURE: 99 F | BODY MASS INDEX: 24.3 KG/M2 | WEIGHT: 159.81 LBS | SYSTOLIC BLOOD PRESSURE: 116 MMHG

## 2021-10-08 DIAGNOSIS — E04.9 GOITER: ICD-10-CM

## 2021-10-08 DIAGNOSIS — R22.1 NECK MASS: Primary | ICD-10-CM

## 2021-10-08 DIAGNOSIS — R22.1 NECK MASS: ICD-10-CM

## 2021-10-08 DIAGNOSIS — E11.9 TYPE 2 DIABETES MELLITUS WITHOUT COMPLICATION, WITHOUT LONG-TERM CURRENT USE OF INSULIN: ICD-10-CM

## 2021-10-08 DIAGNOSIS — E13.9 OTHER SPECIFIED DIABETES MELLITUS WITHOUT COMPLICATIONS: ICD-10-CM

## 2021-10-08 PROCEDURE — 1126F AMNT PAIN NOTED NONE PRSNT: CPT | Mod: CPTII,S$GLB,, | Performed by: HOSPITALIST

## 2021-10-08 PROCEDURE — 3074F PR MOST RECENT SYSTOLIC BLOOD PRESSURE < 130 MM HG: ICD-10-PCS | Mod: CPTII,S$GLB,, | Performed by: HOSPITALIST

## 2021-10-08 PROCEDURE — 99204 PR OFFICE/OUTPT VISIT, NEW, LEVL IV, 45-59 MIN: ICD-10-PCS | Mod: S$GLB,,, | Performed by: HOSPITALIST

## 2021-10-08 PROCEDURE — 3079F PR MOST RECENT DIASTOLIC BLOOD PRESSURE 80-89 MM HG: ICD-10-PCS | Mod: CPTII,S$GLB,, | Performed by: HOSPITALIST

## 2021-10-08 PROCEDURE — 3008F PR BODY MASS INDEX (BMI) DOCUMENTED: ICD-10-PCS | Mod: CPTII,S$GLB,, | Performed by: HOSPITALIST

## 2021-10-08 PROCEDURE — 76536 US SOFT TISSUE HEAD NECK THYROID: ICD-10-PCS | Mod: 26,,, | Performed by: RADIOLOGY

## 2021-10-08 PROCEDURE — 99999 PR PBB SHADOW E&M-EST. PATIENT-LVL IV: CPT | Mod: PBBFAC,,, | Performed by: HOSPITALIST

## 2021-10-08 PROCEDURE — 3074F SYST BP LT 130 MM HG: CPT | Mod: CPTII,S$GLB,, | Performed by: HOSPITALIST

## 2021-10-08 PROCEDURE — 1160F RVW MEDS BY RX/DR IN RCRD: CPT | Mod: CPTII,S$GLB,, | Performed by: HOSPITALIST

## 2021-10-08 PROCEDURE — 99999 PR PBB SHADOW E&M-EST. PATIENT-LVL IV: ICD-10-PCS | Mod: PBBFAC,,, | Performed by: HOSPITALIST

## 2021-10-08 PROCEDURE — 76536 US EXAM OF HEAD AND NECK: CPT | Mod: TC

## 2021-10-08 PROCEDURE — 1159F PR MEDICATION LIST DOCUMENTED IN MEDICAL RECORD: ICD-10-PCS | Mod: CPTII,S$GLB,, | Performed by: HOSPITALIST

## 2021-10-08 PROCEDURE — 3079F DIAST BP 80-89 MM HG: CPT | Mod: CPTII,S$GLB,, | Performed by: HOSPITALIST

## 2021-10-08 PROCEDURE — 3008F BODY MASS INDEX DOCD: CPT | Mod: CPTII,S$GLB,, | Performed by: HOSPITALIST

## 2021-10-08 PROCEDURE — 1160F PR REVIEW ALL MEDS BY PRESCRIBER/CLIN PHARMACIST DOCUMENTED: ICD-10-PCS | Mod: CPTII,S$GLB,, | Performed by: HOSPITALIST

## 2021-10-08 PROCEDURE — 99204 OFFICE O/P NEW MOD 45 MIN: CPT | Mod: S$GLB,,, | Performed by: HOSPITALIST

## 2021-10-08 PROCEDURE — 1159F MED LIST DOCD IN RCRD: CPT | Mod: CPTII,S$GLB,, | Performed by: HOSPITALIST

## 2021-10-08 PROCEDURE — 76536 US EXAM OF HEAD AND NECK: CPT | Mod: 26,,, | Performed by: RADIOLOGY

## 2021-10-08 PROCEDURE — 1126F PR PAIN SEVERITY QUANTIFIED, NO PAIN PRESENT: ICD-10-PCS | Mod: CPTII,S$GLB,, | Performed by: HOSPITALIST

## 2022-02-09 LAB
LEFT EYE DM RETINOPATHY: NEGATIVE
RIGHT EYE DM RETINOPATHY: POSITIVE

## 2022-03-09 LAB
LEFT EYE DM RETINOPATHY: NEGATIVE
RIGHT EYE DM RETINOPATHY: POSITIVE

## 2022-04-07 ENCOUNTER — CLINICAL SUPPORT (OUTPATIENT)
Dept: URGENT CARE | Facility: CLINIC | Age: 72
End: 2022-04-07
Payer: MEDICARE

## 2022-04-07 DIAGNOSIS — Z20.822 ENCOUNTER FOR LABORATORY TESTING FOR COVID-19 VIRUS: Primary | ICD-10-CM

## 2022-04-07 LAB
CTP QC/QA: YES
SARS-COV-2 RDRP RESP QL NAA+PROBE: NEGATIVE

## 2022-04-07 PROCEDURE — U0002 COVID-19 LAB TEST NON-CDC: HCPCS | Mod: QW,S$GLB,, | Performed by: NURSE PRACTITIONER

## 2022-04-07 PROCEDURE — U0002: ICD-10-PCS | Mod: QW,S$GLB,, | Performed by: NURSE PRACTITIONER

## 2022-04-07 NOTE — PATIENT INSTRUCTIONS
Your test was NEGATIVE for COVID-19 (coronavirus).      You may leave home and/or return to work when the following conditions are met:  24 hours fever free without fever-reducing medications AND  Improved symptoms  You are fully vaccinated or have not had close contact with someone with COVID-19 (within 6 feet for 15 minutes or more)    If you are fully vaccinated and had a close contact, there is no need for quarantine, unless you develop symptoms.      If you are not fully vaccinated and had a close contact:  Retest at 5 to 7 days post-exposure  If possible, it is recommended that you quarantine for 5 days from the time of contact regardless of your test status.  A mask should be worn indoors post quarantine.    If your symptoms worsen or if you have any other concerns, please contact [facility] at [phone].     Sincerely,    José Manuel Alcaraz MA

## 2022-05-18 LAB
LEFT EYE DM RETINOPATHY: NEGATIVE
RIGHT EYE DM RETINOPATHY: POSITIVE

## 2022-10-19 LAB
LEFT EYE DM RETINOPATHY: NEGATIVE
RIGHT EYE DM RETINOPATHY: POSITIVE

## 2023-04-30 ENCOUNTER — HOSPITAL ENCOUNTER (EMERGENCY)
Facility: HOSPITAL | Age: 73
Discharge: HOME | End: 2023-04-30
Attending: INTERNAL MEDICINE
Payer: MEDICARE

## 2023-04-30 VITALS
RESPIRATION RATE: 18 BRPM | DIASTOLIC BLOOD PRESSURE: 85 MMHG | HEART RATE: 85 BPM | SYSTOLIC BLOOD PRESSURE: 158 MMHG | BODY MASS INDEX: 23.49 KG/M2 | TEMPERATURE: 98 F | OXYGEN SATURATION: 98 % | HEIGHT: 68 IN | WEIGHT: 155 LBS

## 2023-04-30 DIAGNOSIS — R73.9 HYPERGLYCEMIA: Primary | ICD-10-CM

## 2023-04-30 LAB
ALBUMIN SERPL-MCNC: 3.4 G/DL (ref 3.3–5.5)
ALLENS TEST: ABNORMAL
ALP SERPL-CCNC: 67 U/L (ref 42–141)
BILIRUB SERPL-MCNC: 0.6 MG/DL (ref 0.2–1.6)
BILIRUBIN, POC UA: NEGATIVE
BLOOD, POC UA: NEGATIVE
BUN SERPL-MCNC: 15 MG/DL (ref 7–22)
CALCIUM SERPL-MCNC: 9.9 MG/DL (ref 8–10.3)
CHLORIDE SERPL-SCNC: 106 MMOL/L (ref 98–108)
CLARITY, POC UA: CLEAR
COLOR, POC UA: YELLOW
CREAT SERPL-MCNC: 1.1 MG/DL (ref 0.6–1.2)
CTP QC/QA: YES
GLUCOSE SERPL-MCNC: 374 MG/DL (ref 73–118)
GLUCOSE, POC UA: ABNORMAL
HCO3 UR-SCNC: 25.3 MMOL/L (ref 24–28)
KETONES, POC UA: NEGATIVE
LDH SERPL L TO P-CCNC: 1.77 MMOL/L (ref 0.5–2.2)
LEUKOCYTE EST, POC UA: NEGATIVE
NITRITE, POC UA: NEGATIVE
PCO2 BLDA: 42.6 MMHG (ref 35–45)
PH SMN: 7.38 [PH] (ref 7.35–7.45)
PH UR STRIP: 5.5 [PH]
PO2 BLDA: 43 MMHG (ref 40–60)
POC ALT (SGPT): 26 U/L (ref 10–47)
POC AST (SGOT): 28 U/L (ref 11–38)
POC BE: 0 MMOL/L
POC CARDIAC TROPONIN I: 0 NG/ML (ref 0–0.08)
POC SATURATED O2: 77 % (ref 95–100)
POC TCO2: 26 MMOL/L (ref 18–33)
POC TCO2: 27 MMOL/L (ref 24–29)
POCT GLUCOSE: 176 MG/DL (ref 70–110)
POCT GLUCOSE: 371 MG/DL (ref 70–110)
POTASSIUM BLD-SCNC: 3.6 MMOL/L (ref 3.6–5.1)
PROTEIN, POC UA: ABNORMAL
PROTEIN, POC: 7.1 G/DL (ref 6.4–8.1)
SAMPLE: ABNORMAL
SAMPLE: NORMAL
SARS-COV-2 RDRP RESP QL NAA+PROBE: NEGATIVE
SITE: ABNORMAL
SODIUM BLD-SCNC: 140 MMOL/L (ref 128–145)
SPECIFIC GRAVITY, POC UA: 1.01
UROBILINOGEN, POC UA: 0.2 E.U./DL

## 2023-04-30 PROCEDURE — 96360 HYDRATION IV INFUSION INIT: CPT | Mod: ER

## 2023-04-30 PROCEDURE — 81003 URINALYSIS AUTO W/O SCOPE: CPT | Mod: ER

## 2023-04-30 PROCEDURE — 25000003 PHARM REV CODE 250: Mod: ER | Performed by: EMERGENCY MEDICINE

## 2023-04-30 PROCEDURE — 82803 BLOOD GASES ANY COMBINATION: CPT | Mod: ER

## 2023-04-30 PROCEDURE — 84484 ASSAY OF TROPONIN QUANT: CPT | Mod: ER

## 2023-04-30 PROCEDURE — 83036 HEMOGLOBIN GLYCOSYLATED A1C: CPT | Performed by: EMERGENCY MEDICINE

## 2023-04-30 PROCEDURE — 99284 EMERGENCY DEPT VISIT MOD MDM: CPT | Mod: 25,ER

## 2023-04-30 PROCEDURE — 85025 COMPLETE CBC W/AUTO DIFF WBC: CPT | Mod: ER

## 2023-04-30 PROCEDURE — 80053 COMPREHEN METABOLIC PANEL: CPT | Mod: ER

## 2023-04-30 PROCEDURE — 82962 GLUCOSE BLOOD TEST: CPT | Mod: 91,ER

## 2023-04-30 RX ORDER — METFORMIN HYDROCHLORIDE 1000 MG/1
500 TABLET ORAL
Qty: 30 TABLET | Refills: 1 | Status: SHIPPED | OUTPATIENT
Start: 2023-04-30

## 2023-04-30 RX ADMIN — SODIUM CHLORIDE 1000 ML: 9 INJECTION, SOLUTION INTRAVENOUS at 01:04

## 2023-04-30 NOTE — ED PROVIDER NOTES
Encounter Date: 4/30/2023    SCRIBE #1 NOTE: I, HEIDY Pryor, am scribing for, and in the presence of,  Angelita Luna MD. I have scribed the following portions of the note - Other sections scribed: HPI, ROS, PE.     History     Chief Complaint   Patient presents with    Diabetes     Hyperglycemia and general malaise.      Emmanuel Grant is a 72 y.o. male, with a PMHx of HTN, DM, and Stroke, who presents to the ED with blood sugar concerns. Patient reports blood sugar has been low for the past month (~59-60). Pt has been exercising a lot, so he eats carbs/sugar to stabilize blood sugar. Pt noticed blood sugar was low this morning, so he ate some pancakes. After eating, blood sugar increased. Pt is concerned due to blood sugar imbalance. No other exacerbating or alleviating factors. Pt reports compliance with daily medications. Pt also complains of some associated dizziness.    The history is provided by the patient. No  was used.   Review of patient's allergies indicates:   Allergen Reactions    Dapagliflozin      Other reaction(s): Other (See Comments)    Pcn [penicillins]     Lisinopril Other (See Comments)     cough     Past Medical History:   Diagnosis Date    Diabetes mellitus     Hypertension     Kidney stone     Stroke      Past Surgical History:   Procedure Laterality Date    SHOULDER SURGERY Right      No family history on file.  Social History     Tobacco Use    Smoking status: Never    Smokeless tobacco: Never   Substance Use Topics    Alcohol use: No     Review of Systems   Constitutional:  Negative for appetite change, chills, fatigue and fever.        (+) Blood sugar concerns   HENT:  Negative for nosebleeds, rhinorrhea, sneezing and sore throat.    Eyes:  Negative for photophobia, pain and visual disturbance.   Respiratory:  Negative for cough, chest tightness and shortness of breath.    Cardiovascular:  Negative for chest pain, palpitations and leg swelling.   Gastrointestinal:   Negative for abdominal pain, constipation, diarrhea and nausea.   Genitourinary:  Negative for dysuria and urgency.   Musculoskeletal:  Negative for back pain, gait problem, neck pain and neck stiffness.   Skin:  Negative for color change and rash.   Neurological:  Positive for dizziness. Negative for weakness, light-headedness, numbness and headaches.   Hematological:  Negative for adenopathy. Does not bruise/bleed easily.   Psychiatric/Behavioral:  Negative for confusion, decreased concentration and suicidal ideas.      Physical Exam     Initial Vitals [04/30/23 1226]   BP Pulse Resp Temp SpO2   (!) 167/89 90 18 98 °F (36.7 °C) 97 %      MAP       --         Physical Exam    Nursing note and vitals reviewed.  Constitutional: He appears well-developed and well-nourished.   HENT:   Head: Normocephalic and atraumatic.   Eyes: Conjunctivae and EOM are normal. Pupils are equal, round, and reactive to light.   Neck:   Normal range of motion.  Cardiovascular:  Normal rate.           Pulmonary/Chest: Breath sounds normal.   Abdominal: Abdomen is soft.   Musculoskeletal:         General: Normal range of motion.      Cervical back: Normal range of motion.     Neurological: He is alert and oriented to person, place, and time.   Skin: Skin is warm.   Psychiatric: He has a normal mood and affect.       ED Course   Procedures  Labs Reviewed   POCT URINALYSIS W/O SCOPE - Abnormal; Notable for the following components:       Result Value    Glucose, UA 2+ (*)     Protein, UA 2+ (*)     All other components within normal limits   POCT GLUCOSE - Abnormal; Notable for the following components:    POCT Glucose 371 (*)     All other components within normal limits   ISTAT PROCEDURE - Abnormal; Notable for the following components:    POC SATURATED O2 77 (*)     All other components within normal limits   POCT CMP - Abnormal; Notable for the following components:    POC Glucose 374 (*)     All other components within normal limits    POCT GLUCOSE - Abnormal; Notable for the following components:    POCT Glucose 176 (*)     All other components within normal limits   TROPONIN ISTAT   HEMOGLOBIN A1C   POCT CBC   SARS-COV-2 RDRP GENE    Narrative:     This test utilizes isothermal nucleic acid amplification technology to detect the SARS-CoV-2 RdRp nucleic acid segment. The analytical sensitivity (limit of detection) is 500 copies/swab.     A POSITIVE result is indicative of the presence of SARS-CoV-2 RNA; clinical correlation with patient history and other diagnostic information is necessary to determine patient infection status.    A NEGATIVE result means that SARS-CoV-2 nucleic acids are not present above the limit of detection. A NEGATIVE result should be treated as presumptive. It does not rule out the possibility of COVID-19 and should not be the sole basis for treatment decisions. If COVID-19 is strongly suspected based on clinical and exposure history, re-testing using an alternate molecular assay should be considered.     This test is only for use under the Food and Drug Administration s Emergency Use Authorization (EUA).     Commercial kits are provided by [employer]. Performance characteristics of the EUA have been independently verified by [facility] Department of Pathology and Laboratory Medicine.   _________________________________________________________________   The authorized Fact Sheet for Healthcare Providers and the authorized Fact Sheet for Patients of the ID NOW COVID-19 are available on the FDA website:    https://www.fda.gov/media/223590/download      https://www.fda.gov/media/968468/download      POCT URINALYSIS W/O SCOPE   POCT CMP   POCT TROPONIN   POCT GLUCOSE MONITORING CONTINUOUS          Imaging Results    None          Medications   sodium chloride 0.9% bolus 1,000 mL 1,000 mL (0 mLs Intravenous Stopped 4/30/23 1410)     Medical Decision Making:   History:   Old Medical Records: I decided to  obtain old medical records.  Clinical Tests:   Lab Tests: Ordered and Reviewed        Scribe Attestation:   Scribe #1: I performed the above scribed service and the documentation accurately describes the services I performed. I attest to the accuracy of the note.                 I, Dr. Angelita Luna, personally performed the services described in this documentation. All medical record entries made by the scribe were at my direction and in my presence.  I have reviewed the chart and agree that the record reflects my personal performance and is accurate and complete. Angelita Luna MD.    Clinical Impression:   Final diagnoses:  [R73.9] Hyperglycemia (Primary)        ED Disposition Condition    Discharge Stable          ED Prescriptions       Medication Sig Dispense Start Date End Date Auth. Provider    metFORMIN (GLUCOPHAGE) 1000 MG tablet Take 0.5 tablets (500 mg total) by mouth daily with breakfast. 30 tablet 4/30/2023 -- Angelita Luna MD          Follow-up Information       Follow up With Specialties Details Why Contact Info    Payam Vu Jr., NP Internal Medicine Schedule an appointment as soon as possible for a visit in 2 days  [address] [facility] [address]  [phone]               Angelita Luna MD  04/30/23 8604

## 2023-04-30 NOTE — DISCHARGE INSTRUCTIONS
CONTINUE ALL YOUR MEDICINE, BUT CUT THE METFORMIN IN HALF, SO TAKE 500 MG DAILY INSTEAD OF A 1000.  CHECK YOUR BLOOD SUGAR IN THE MORNING IN THE EVENING AND ESPECIALLY BETWEEN THE HOURS OF 4:00 P.M. AND 6:00 P.M. WHEN YOU HAVE BEEN GETTING LOW BLOOD SUGAR.  WRITE DOWN ALL YOUR BLOOD SUGAR READINGS, AND SEE YOUR PRIMARY CARE DOCTOR ON MONDAY OR TUESDAY WITHOUT FAIL TO CONSIDER MEDICATION ADJUSTMENTS

## 2023-05-01 LAB
ESTIMATED AVG GLUCOSE: 128 MG/DL (ref 68–131)
HBA1C MFR BLD: 6.1 % (ref 4–5.6)

## 2023-06-09 LAB
LEFT EYE DM RETINOPATHY: NEGATIVE
RIGHT EYE DM RETINOPATHY: NEGATIVE

## 2023-07-07 ENCOUNTER — HOSPITAL ENCOUNTER (EMERGENCY)
Facility: HOSPITAL | Age: 73
Discharge: HOME | End: 2023-07-07
Attending: EMERGENCY MEDICINE
Payer: MEDICARE

## 2023-07-07 VITALS
SYSTOLIC BLOOD PRESSURE: 136 MMHG | BODY MASS INDEX: 22.73 KG/M2 | WEIGHT: 150 LBS | HEART RATE: 72 BPM | RESPIRATION RATE: 18 BRPM | DIASTOLIC BLOOD PRESSURE: 74 MMHG | TEMPERATURE: 98 F | OXYGEN SATURATION: 97 % | HEIGHT: 68 IN

## 2023-07-07 DIAGNOSIS — Z20.822 CLOSE EXPOSURE TO COVID-19 VIRUS: ICD-10-CM

## 2023-07-07 DIAGNOSIS — J18.9 PNEUMONIA OF BOTH LOWER LOBES DUE TO INFECTIOUS ORGANISM: Primary | ICD-10-CM

## 2023-07-07 DIAGNOSIS — I10 HTN (HYPERTENSION): ICD-10-CM

## 2023-07-07 DIAGNOSIS — E04.9 ENLARGED THYROID: ICD-10-CM

## 2023-07-07 DIAGNOSIS — J30.9 ALLERGIC RHINITIS, UNSPECIFIED SEASONALITY, UNSPECIFIED TRIGGER: ICD-10-CM

## 2023-07-07 LAB
ALBUMIN SERPL-MCNC: 3.8 G/DL (ref 3.3–5.5)
ALP SERPL-CCNC: 64 U/L (ref 42–141)
BILIRUB SERPL-MCNC: 0.7 MG/DL (ref 0.2–1.6)
BUN SERPL-MCNC: 25 MG/DL (ref 7–22)
CALCIUM SERPL-MCNC: 9.5 MG/DL (ref 8–10.3)
CHLORIDE SERPL-SCNC: 105 MMOL/L (ref 98–108)
CREAT SERPL-MCNC: 1.3 MG/DL (ref 0.6–1.2)
CTP QC/QA: YES
GLUCOSE SERPL-MCNC: 127 MG/DL (ref 73–118)
INFLUENZA A ANTIGEN, POC: NEGATIVE
INFLUENZA B ANTIGEN, POC: NEGATIVE
POC ALT (SGPT): 33 U/L (ref 10–47)
POC AST (SGOT): 29 U/L (ref 11–38)
POC B-TYPE NATRIURETIC PEPTIDE: 107 PG/ML (ref 0–100)
POC CARDIAC TROPONIN I: 0.01 NG/ML (ref 0–0.08)
POC PTINR: 1.2 (ref 0.9–1.2)
POC PTWBT: 14.6 SEC (ref 9.7–14.3)
POC TCO2: 28 MMOL/L (ref 18–33)
POCT GLUCOSE: 131 MG/DL (ref 70–110)
POTASSIUM BLD-SCNC: 4.1 MMOL/L (ref 3.6–5.1)
PROTEIN, POC: 7.3 G/DL (ref 6.4–8.1)
SAMPLE: ABNORMAL
SAMPLE: NORMAL
SARS-COV-2 RDRP RESP QL NAA+PROBE: NEGATIVE
SARS-COV-2 RNA RESP QL NAA+PROBE: NOT DETECTED
SODIUM BLD-SCNC: 142 MMOL/L (ref 128–145)

## 2023-07-07 PROCEDURE — 87635 SARS-COV-2 COVID-19 AMP PRB: CPT | Performed by: EMERGENCY MEDICINE

## 2023-07-07 PROCEDURE — 25000003 PHARM REV CODE 250: Mod: ER | Performed by: NURSE PRACTITIONER

## 2023-07-07 PROCEDURE — 25000242 PHARM REV CODE 250 ALT 637 W/ HCPCS: Mod: ER | Performed by: EMERGENCY MEDICINE

## 2023-07-07 PROCEDURE — 87635 SARS-COV-2 COVID-19 AMP PRB: CPT | Mod: ER | Performed by: EMERGENCY MEDICINE

## 2023-07-07 PROCEDURE — 93010 EKG 12-LEAD: ICD-10-PCS | Mod: ,,, | Performed by: INTERNAL MEDICINE

## 2023-07-07 PROCEDURE — 25000003 PHARM REV CODE 250: Mod: ER | Performed by: EMERGENCY MEDICINE

## 2023-07-07 PROCEDURE — 80053 COMPREHEN METABOLIC PANEL: CPT | Mod: ER

## 2023-07-07 PROCEDURE — 99285 EMERGENCY DEPT VISIT HI MDM: CPT | Mod: 25,ER

## 2023-07-07 PROCEDURE — 84484 ASSAY OF TROPONIN QUANT: CPT | Mod: ER

## 2023-07-07 PROCEDURE — 94640 AIRWAY INHALATION TREATMENT: CPT | Mod: ER

## 2023-07-07 PROCEDURE — 87804 INFLUENZA ASSAY W/OPTIC: CPT | Mod: 59,ER

## 2023-07-07 PROCEDURE — 83880 ASSAY OF NATRIURETIC PEPTIDE: CPT | Mod: ER

## 2023-07-07 PROCEDURE — 93010 ELECTROCARDIOGRAM REPORT: CPT | Mod: ,,, | Performed by: INTERNAL MEDICINE

## 2023-07-07 PROCEDURE — 82962 GLUCOSE BLOOD TEST: CPT | Mod: ER

## 2023-07-07 PROCEDURE — 85610 PROTHROMBIN TIME: CPT | Mod: ER

## 2023-07-07 PROCEDURE — 85025 COMPLETE CBC W/AUTO DIFF WBC: CPT | Mod: ER

## 2023-07-07 PROCEDURE — 25500020 PHARM REV CODE 255: Mod: ER | Performed by: EMERGENCY MEDICINE

## 2023-07-07 PROCEDURE — 93005 ELECTROCARDIOGRAM TRACING: CPT | Mod: ER

## 2023-07-07 RX ORDER — DEXTROMETHORPHAN HYDROBROMIDE, GUAIFENESIN 5; 100 MG/5ML; MG/5ML
650 LIQUID ORAL EVERY 8 HOURS
Qty: 20 TABLET | Refills: 0 | Status: SHIPPED | OUTPATIENT
Start: 2023-07-07 | End: 2023-07-14

## 2023-07-07 RX ORDER — ALBUTEROL SULFATE 90 UG/1
1-2 AEROSOL, METERED RESPIRATORY (INHALATION) EVERY 6 HOURS PRN
Qty: 18 G | Refills: 0 | OUTPATIENT
Start: 2023-07-07 | End: 2024-02-16

## 2023-07-07 RX ORDER — AZITHROMYCIN 250 MG/1
TABLET, FILM COATED ORAL
Qty: 6 TABLET | Refills: 0 | Status: SHIPPED | OUTPATIENT
Start: 2023-07-07 | End: 2023-07-12

## 2023-07-07 RX ORDER — BENZONATATE 100 MG/1
100 CAPSULE ORAL 3 TIMES DAILY PRN
Qty: 30 CAPSULE | Refills: 0 | Status: SHIPPED | OUTPATIENT
Start: 2023-07-07 | End: 2023-07-17

## 2023-07-07 RX ORDER — BENZONATATE 100 MG/1
200 CAPSULE ORAL
Status: COMPLETED | OUTPATIENT
Start: 2023-07-07 | End: 2023-07-07

## 2023-07-07 RX ORDER — IPRATROPIUM BROMIDE AND ALBUTEROL SULFATE 2.5; .5 MG/3ML; MG/3ML
3 SOLUTION RESPIRATORY (INHALATION) ONCE
Status: COMPLETED | OUTPATIENT
Start: 2023-07-07 | End: 2023-07-07

## 2023-07-07 RX ADMIN — BENZONATATE 200 MG: 100 CAPSULE ORAL at 12:07

## 2023-07-07 RX ADMIN — IPRATROPIUM BROMIDE AND ALBUTEROL SULFATE 3 ML: .5; 3 SOLUTION RESPIRATORY (INHALATION) at 12:07

## 2023-07-07 RX ADMIN — IOHEXOL 100 ML: 350 INJECTION, SOLUTION INTRAVENOUS at 12:07

## 2023-07-07 RX ADMIN — SODIUM CHLORIDE 1000 ML: 9 INJECTION, SOLUTION INTRAVENOUS at 01:07

## 2023-07-07 NOTE — Clinical Note
"Jordankleber "Nasreen Grant was seen and treated in our emergency department on 7/7/2023.     COVID-19 is present in our communities across the state. There is limited testing for COVID at this time, so not all patients can be tested. In this situation, your employee meets the following criteria:    Emmanuel Grant has met the criteria for COVID-19 testing and has a NEGATIVE result. The employee can return to work once they are asymptomatic for 24 hours without the use of fever reducing medications (Tylenol, Motrin, etc).     If the employee is not fully vaccinated and had a close contact:  · Retest at 5 to 7 days post-exposure  · If possible, it is recommended that they quarantine for 5 days from the time of contact regardless of their test status.  · A mask should be worn post quarantine for 5 days.    If you have any questions or concerns, or if I can be of further assistance, please do not hesitate to contact me.    Sincerely,             JENNIFER Melvin"

## 2023-07-07 NOTE — ED PROVIDER NOTES
"Encounter Date: 7/7/2023       History     Chief Complaint   Patient presents with    Cough     Pt report cough, sneezing, and body aches x 1 day. Pt coworker was sick. Denies CP, SOB     72 y.o. male with a PMH of HTN, DM who presents to the Emergency Department with complaints of cough since this AM.  He also reports associated symptoms of sneezing and runny nose.  He states that his co-worker was "sick".  He denies rash, fever, chest pain, SOB, numbness, weakness, tingling, abdominal pain, back pain, dysuria, hematuria, nausea, vomiting, diarrhea, or any other complaints.   He rates the pain as 5/10 and has not taken any medications for the symptoms.  No Alleviating/aggravating factors.              The history is provided by the patient.   Review of patient's allergies indicates:   Allergen Reactions    Dapagliflozin      Other reaction(s): Other (See Comments)    Pcn [penicillins]     Lisinopril Other (See Comments)     cough     Past Medical History:   Diagnosis Date    Diabetes mellitus     Hypertension     Kidney stone     Stroke      Past Surgical History:   Procedure Laterality Date    SHOULDER SURGERY Right      History reviewed. No pertinent family history.  Social History     Tobacco Use    Smoking status: Never     Passive exposure: Never    Smokeless tobacco: Never   Substance Use Topics    Alcohol use: No     Review of Systems   Constitutional:  Negative for chills and fever.   HENT:  Positive for rhinorrhea and sneezing. Negative for congestion, ear pain and sore throat.    Eyes:  Negative for pain, discharge and redness.   Respiratory:  Positive for cough. Negative for shortness of breath.    Cardiovascular:  Negative for chest pain.   Gastrointestinal:  Negative for abdominal pain, diarrhea, nausea and vomiting.   Genitourinary:  Negative for dysuria.   Musculoskeletal:  Negative for back pain, neck pain and neck stiffness.   Skin:  Negative for rash.   Neurological:  Negative for dizziness, " weakness, light-headedness, numbness and headaches.   Psychiatric/Behavioral:  Negative for confusion.      Physical Exam     Initial Vitals [07/07/23 1001]   BP Pulse Resp Temp SpO2   (!) 141/84 79 16 98 °F (36.7 °C) 96 %      MAP       --         Physical Exam    Nursing note and vitals reviewed.  Constitutional: He appears well-developed. He is cooperative.  Non-toxic appearance. He does not appear ill.   HENT:   Head: Normocephalic and atraumatic.   Right Ear: Tympanic membrane, external ear and ear canal normal.   Left Ear: Tympanic membrane, external ear and ear canal normal.   Nose: Mucosal edema present.   Mouth/Throat: Uvula is midline, oropharynx is clear and moist and mucous membranes are normal.   Eyes: Conjunctivae are normal.   Neck: No Brudzinski's sign and no Kernig's sign noted.   Normal range of motion.  Cardiovascular:  Normal rate and regular rhythm.           Pulmonary/Chest: Effort normal and breath sounds normal.   Abdominal: Abdomen is soft. There is no abdominal tenderness.   Musculoskeletal:      Cervical back: Normal range of motion.     Lymphadenopathy:     He has no cervical adenopathy.   Neurological: He is alert and oriented to person, place, and time. GCS eye subscore is 4. GCS verbal subscore is 5. GCS motor subscore is 6.   Skin: Skin is warm, dry and intact. No rash noted.   Psychiatric: He has a normal mood and affect. His speech is normal and behavior is normal. Thought content normal.       ED Course   Procedures  Labs Reviewed   POCT GLUCOSE - Abnormal; Notable for the following components:       Result Value    POCT Glucose 131 (*)     All other components within normal limits   POCT CMP - Abnormal; Notable for the following components:    POC BUN 25 (*)     POC Creatinine 1.3 (*)     POC Glucose 127 (*)     All other components within normal limits   ISTAT PROCEDURE - Abnormal; Notable for the following components:    POC PTWBT 14.6 (*)     All other components within normal  limits   POCT B-TYPE NATRIURETIC PEPTIDE (BNP) - Abnormal; Notable for the following components:    POC B-Type Natriuretic Peptide 107 (*)     All other components within normal limits   TROPONIN ISTAT   SARS-COV-2 (COVID-19) QUALITATIVE PCR   SARS-COV-2 RDRP GENE    Narrative:     This test utilizes isothermal nucleic acid amplification technology to detect the SARS-CoV-2 RdRp nucleic acid segment. The analytical sensitivity (limit of detection) is 500 copies/swab.     A POSITIVE result is indicative of the presence of SARS-CoV-2 RNA; clinical correlation with patient history and other diagnostic information is necessary to determine patient infection status.    A NEGATIVE result means that SARS-CoV-2 nucleic acids are not present above the limit of detection. A NEGATIVE result should be treated as presumptive. It does not rule out the possibility of COVID-19 and should not be the sole basis for treatment decisions. If COVID-19 is strongly suspected based on clinical and exposure history, re-testing using an alternate molecular assay should be considered.     This test is only for use under the Food and Drug Administration s Emergency Use Authorization (EUA).     Commercial kits are provided by [employer]. Performance characteristics of the EUA have been independently verified by [facility] Department of Pathology and Laboratory Medicine.   _________________________________________________________________   The authorized Fact Sheet for Healthcare Providers and the authorized Fact Sheet for Patients of the ID NOW COVID-19 are available on the FDA website:    https://www.fda.gov/media/617880/download      https://www.fda.gov/media/160924/download      POCT CBC   SARS-COV-2 RDRP GENE   POCT INFLUENZA A/B MOLECULAR   POCT GLUCOSE MONITORING CONTINUOUS   POCT RAPID INFLUENZA A/B   POCT CMP   POCT PROTIME-INR   POCT TROPONIN   POCT B-TYPE NATRIURETIC PEPTIDE (BNP)                Imaging Results                CTA Chest Non-Coronary (PE Studies) (Final result)  Result time 07/07/23 13:24:31      Final result by Hai Flynn MD (07/07/23 13:24:31)                   Impression:      This report was flagged in Epic as abnormal.    1. Allowing for motion artifact, no convincing pulmonary thromboembolism.  2. Pulmonary findings suggest interstitial disease with developing fibrotic change along the periphery of the pulmonary parenchyma.  Interlobular septal thickening could reflect associated interstitial edema.  Correlation is advised, comparison with any more recent exams would be helpful.  3. Please see above for several additional findings.      Electronically signed by: Hai Flynn MD  Date:    07/07/2023  Time:    13:24               Narrative:    EXAMINATION:  CTA CHEST NON CORONARY (PE STUDIES)    CLINICAL HISTORY:  Pulmonary embolism (PE) suspected, high prob;    TECHNIQUE:  Low dose axial images, sagittal and coronal reformations were obtained from the thoracic inlet to the lung bases following the IV administration of 100 mL of Omnipaque 350.  Contrast timing was optimized to evaluate the pulmonary arteries.  MIP images were performed.    COMPARISON:  Radiograph 07/07/2023    FINDINGS:  The thyroid gland is prominent, further evaluation with ultrasound could be performed on a nonemergent basis if not previously performed as warranted.  There are a few scattered nonenlarged though numerous mediastinal lymph nodes.  The heart is not enlarged.  The thoracic aorta tapers normally.  Allowing for motion artifact, the visualized portions of the adrenal glands, spleen, pancreas, gallbladder and liver are grossly unremarkable.  The kidneys enhance symmetrically noting scattered low attenuating lesions, the majority of which are too small for characterization, largest within the interpolar region of the left kidney measures 2.3 cm with attenuation suggesting cyst.    Motion artifact limits evaluation of the  airways and pulmonary parenchyma.  Allowing for this, the airways are grossly patent.  There is biapical atelectasis or scarring.  There is multifocal scattered bandlike atelectasis/scarring throughout the parenchyma.  There is scattered interlobular septal thickening.  There is peripheral scattered regions of fibrosis.  There is bilateral basilar dependent atelectasis/scarring.  No large focal consolidation.  No pneumothorax.  No pleural effusion.    Bolus timing is adequate for evaluation of pulmonary thromboembolism.  Allowing for motion artifact, no convincing pulmonary arterial filling defect to the level of the segmental branches bilaterally to suggest pulmonary thromboembolism.    There is osteopenia.  There are degenerative changes of the spine.  There is bilateral gynecomastia.  There is anterior wedging of T6, given surrounding degenerative changes, suggests chronic change.  Correlation with any focal tenderness recommended.  No significant axillary lymphadenopathy.                                       X-Ray Chest PA And Lateral (Final result)  Result time 07/07/23 11:00:06      Final result by Jayla Tadeo MD (07/07/23 11:00:06)                   Impression:      As above      Electronically signed by: Jayla Tadeo MD  Date:    07/07/2023  Time:    11:00               Narrative:    EXAMINATION:  XR CHEST PA AND LATERAL    CLINICAL HISTORY:  cough;    TECHNIQUE:  PA and lateral views of the chest were performed.    COMPARISON:  August 2019    FINDINGS:  The heart size is not enlarged.  There is no significant pleural effusion.  Degenerative changes are present within the osseous structures.  There are increased interstitial markings within the lungs, most notably in the lower lung zones bilaterally.  While nonspecific, given clinical history this may represent developing infectious etiology.  Edema or other etiologies would be considered less likely.  No superimposed focal consolidation.                                        Medications   sodium chloride 0.9% bolus 1,000 mL 1,000 mL (1,000 mLs Intravenous New Bag 7/7/23 1309)   albuterol-ipratropium 2.5 mg-0.5 mg/3 mL nebulizer solution 3 mL (3 mLs Nebulization Given 7/7/23 1246)   benzonatate capsule 200 mg (200 mg Oral Given 7/7/23 1201)   iohexoL (OMNIPAQUE 350) injection 100 mL (100 mLs Intravenous Given 7/7/23 1237)           APC / Resident Notes:   This is an urgent evaluation of a 72 y.o. male that presents to the Emergency Department for URI Symptoms for 1 days.  The patient is a non-toxic, afebrile, and well appearing male. On physical exam: Ears: without infection.  Pharynx without infection. Appears well hydrated with moist mucus membranes. Neck soft and supple with no meningeal signs or cervical lymphadenopathy.  Breath sounds are clear and equal bilaterally with no adventitious breath sounds, tachypnea or respiratory distress.  Oxygen saturation is 96% on Room Air, no evidence of hypoxia.  On reevaluation, patient had some minimal expiratory wheezing    Vital Signs: 141/84, 98, 79, 16, 96%   If available, previous records reviewed.   I ordered labs and personally reviewed them.  Labs significant for CBG 13, , troponin 0.01, BUN 25, creatinine 1.3, INR 1.2, WBC 7.5, H&H 14&41, plt 169  I ordered X-rays and reviewed the radiologist interpretation.  Xray significant for The heart size is not enlarged.  There is no significant pleural effusion.  Degenerative changes are present within the osseous structures.  There are increased interstitial markings within the lungs, most notably in the lower lung zones bilaterally.  While nonspecific, given clinical history this may represent developing infectious etiology.  Edema or other etiologies would be considered less likely.  No superimposed focal consolidation    I ordered a CT scan and reviewed the radiologist interpretation.  CT scan significant for Allowing for motion artifact, no convincing  pulmonary thromboembolism; Pulmonary findings suggest interstitial disease with developing fibrotic change along the periphery of the pulmonary parenchyma.  Interlobular septal thickening could reflect associated interstitial edema  Flu: Negative  COVID-19: Negative    Given the above findings, my overall impression is Early pneumonia per imaging, allergic rhinitis, enlarged thyroid gland. Given the above findings, I do not think the patient has COVID, Flu, OM, OE, strep pharyngitis, meningitis, pneumonia, bacterial sinusitis, or significant dehydration requiring IV fluids or admission    During his stay in the ED, the patient has been given albuterol, NS with good relief of his symptoms. The patient will be discharged home with Azithromycin, tessalon perles, claritin, flonase. Additional home care recommendations include Tylenol, Hydration. The diagnosis, treatment plan, instructions for follow-up, strict return precautions, and reevaluation with his PCP as well as ED return precautions have been discussed with the patient and he has verbalized an understanding of the information.  All questions or concerns from the patient have been addressed.     This case was discussed with and the patient has been examined by my attending Dr. Reid who is in agreement with my assessment and plan.        ED Course as of 07/07/23 1336   Fri Jul 07, 2023   1242 EKG interpreted by Dr. Reid.  No STEMI.  Sinus rhythm, ventricular rate of 70.  Normal axis.  Abnormal EKG, nonspecific T-wave abnormality appreciated.  QTC normal at 442.  When compared to prior EKG done on 08/17/2019 rate has decreased by 6 beats per minute today [RF]      ED Course User Index  [RF] Ivette Reid DO                 Clinical Impression:   Final diagnoses:  [I10] HTN (hypertension)  [J18.9] Pneumonia of both lower lobes due to infectious organism (Primary)  [J30.9] Allergic rhinitis, unspecified seasonality, unspecified trigger  [E04.9] Enlarged  thyroid  [Z20.822] Close exposure to COVID-19 virus        ED Disposition Condition    Discharge Stable          ED Prescriptions       Medication Sig Dispense Start Date End Date Auth. Provider    azithromycin (Z-ARA) 250 MG tablet Take 2 tablets by mouth on day 1; Take 1 tablet by mouth on days 2-5 6 tablet 7/7/2023 7/12/2023 JENNIFER Melvin    albuterol (PROVENTIL/VENTOLIN HFA) 90 mcg/actuation inhaler Inhale 1-2 puffs into the lungs every 6 (six) hours as needed for Wheezing (cough). Rescue 18 g 7/7/2023 8/6/2023 JENNIFER Melvin    benzonatate (TESSALON) 100 MG capsule Take 1 capsule (100 mg total) by mouth 3 (three) times daily as needed for Cough. 30 capsule 7/7/2023 7/17/2023 JENNIFER Melvin    acetaminophen (TYLENOL) 650 MG TbSR Take 1 tablet (650 mg total) by mouth every 8 (eight) hours. for 7 days 20 tablet 7/7/2023 7/14/2023 JENNIFER Melvin          Follow-up Information       Follow up With Specialties Details Why Contact Info    Payam Vu Jr., NP Internal Medicine Schedule an appointment as soon as possible for a visit in 2 days  [address] [facility] [address] [phone]      [facility] ED Emergency Medicine Go to  If symptoms worsen [address] [facility] [address] [phone]             JENNIFER Melvin  07/07/23 1624

## 2023-07-07 NOTE — DISCHARGE INSTRUCTIONS
§ Your CT scan showed an enlarged thyroid gland.  This is likely not the cause of your symptoms.  Please f/u with your PCP ASAP for further evaluation    Please return to the Emergency Department for any new or worsening symptoms including: fever, chest pain, shortness of breath, loss of consciousness, dizziness, weakness, or any other concerns.     § Schedule an appointment for follow up with your Primary Care Doctor as soon as possible for a recheck of your symptoms. If you do not have one, contact the one listed on your discharge paperwork or call the [facility] Appointment Desk at [phone] to schedule an appointment.     § If you require follow up care from a specialist and are unable to schedule an appointment with them directly, please contact your Primary Care Provider on the next business day to set up a referral.      § Please take all medication as prescribed.

## 2023-07-20 ENCOUNTER — HOSPITAL ENCOUNTER (EMERGENCY)
Facility: HOSPITAL | Age: 73
Discharge: HOME | End: 2023-07-20
Attending: EMERGENCY MEDICINE
Payer: MEDICARE

## 2023-07-20 VITALS
OXYGEN SATURATION: 97 % | DIASTOLIC BLOOD PRESSURE: 88 MMHG | HEIGHT: 68 IN | SYSTOLIC BLOOD PRESSURE: 166 MMHG | WEIGHT: 150 LBS | RESPIRATION RATE: 18 BRPM | HEART RATE: 60 BPM | TEMPERATURE: 98 F | BODY MASS INDEX: 22.73 KG/M2

## 2023-07-20 DIAGNOSIS — R25.2 MUSCLE CRAMPS: Primary | ICD-10-CM

## 2023-07-20 DIAGNOSIS — E86.0 DEHYDRATION: ICD-10-CM

## 2023-07-20 DIAGNOSIS — E11.65 HYPERGLYCEMIA DUE TO DIABETES MELLITUS: ICD-10-CM

## 2023-07-20 LAB
ALBUMIN SERPL-MCNC: 4.3 G/DL (ref 3.3–5.5)
ALP SERPL-CCNC: 66 U/L (ref 42–141)
BILIRUB SERPL-MCNC: 0.8 MG/DL (ref 0.2–1.6)
BILIRUBIN, POC UA: NEGATIVE
BLOOD, POC UA: ABNORMAL
BUN SERPL-MCNC: 25 MG/DL (ref 7–22)
CALCIUM SERPL-MCNC: 9.9 MG/DL (ref 8–10.3)
CHLORIDE SERPL-SCNC: 102 MMOL/L (ref 98–108)
CK SERPL-CCNC: 199 U/L (ref 20–200)
CLARITY, POC UA: CLEAR
COLOR, POC UA: ABNORMAL
CREAT SERPL-MCNC: 1.2 MG/DL (ref 0.6–1.2)
GLUCOSE SERPL-MCNC: 179 MG/DL (ref 73–118)
GLUCOSE, POC UA: ABNORMAL
KETONES, POC UA: NEGATIVE
LEFT EYE DM RETINOPATHY: NEGATIVE
LEUKOCYTE EST, POC UA: NEGATIVE
NITRITE, POC UA: NEGATIVE
PH UR STRIP: 5.5 [PH]
POC ALT (SGPT): 29 U/L (ref 10–47)
POC AST (SGOT): 33 U/L (ref 11–38)
POC TCO2: 28 MMOL/L (ref 18–33)
POCT GLUCOSE: 202 MG/DL (ref 70–110)
POTASSIUM BLD-SCNC: 4.5 MMOL/L (ref 3.6–5.1)
PROTEIN, POC UA: ABNORMAL
PROTEIN, POC: 8.7 G/DL (ref 6.4–8.1)
RIGHT EYE DM RETINOPATHY: NEGATIVE
SODIUM BLD-SCNC: 141 MMOL/L (ref 128–145)
SPECIFIC GRAVITY, POC UA: 1.01
UROBILINOGEN, POC UA: 0.2 E.U./DL

## 2023-07-20 PROCEDURE — 25000003 PHARM REV CODE 250: Mod: ER | Performed by: EMERGENCY MEDICINE

## 2023-07-20 PROCEDURE — 25000003 PHARM REV CODE 250: Mod: ER | Performed by: NURSE PRACTITIONER

## 2023-07-20 PROCEDURE — 82962 GLUCOSE BLOOD TEST: CPT | Mod: ER

## 2023-07-20 PROCEDURE — 99284 EMERGENCY DEPT VISIT MOD MDM: CPT | Mod: ER,25

## 2023-07-20 PROCEDURE — 96361 HYDRATE IV INFUSION ADD-ON: CPT | Mod: ER

## 2023-07-20 PROCEDURE — 96360 HYDRATION IV INFUSION INIT: CPT | Mod: ER

## 2023-07-20 PROCEDURE — 82550 ASSAY OF CK (CPK): CPT | Performed by: NURSE PRACTITIONER

## 2023-07-20 RX ORDER — METHOCARBAMOL 500 MG/1
1000 TABLET, FILM COATED ORAL NIGHTLY
Qty: 14 TABLET | Refills: 0 | Status: SHIPPED | OUTPATIENT
Start: 2023-07-20 | End: 2023-07-27

## 2023-07-20 RX ADMIN — SODIUM CHLORIDE 1000 ML: 9 INJECTION, SOLUTION INTRAVENOUS at 02:07

## 2023-07-20 RX ADMIN — SODIUM CHLORIDE 1000 ML: 9 INJECTION, SOLUTION INTRAVENOUS at 12:07

## 2023-07-20 NOTE — Clinical Note
"Emmanuel Desai"Juanita was seen and treated in our emergency department on 7/20/2023.  He may return to work on 07/24/2023.       If you have any questions or concerns, please don't hesitate to call.      Angela Aguilar MD"

## 2023-07-20 NOTE — DISCHARGE INSTRUCTIONS
Drink lots of fluids - alternate water and sugar free sports drinks. Eat a banana and drink a glass of OJ daily. Monitor your glucose. Return to ER for any concerns or worsening symptoms. Take muscle relaxer at bedtime.

## 2023-07-20 NOTE — ED PROVIDER NOTES
"Encounter Date: 7/20/2023    SCRIBE #1 NOTE: I, Andreas Meehan, am scribing for, and in the presence of,  Angela Aguilar MD.. I have scribed the following portions of the note - Other sections scribed: HPI, ROS, PE.     History     Chief Complaint   Patient presents with    Spasms     A 73 y/o male presents to the ED c/o muscle spasm "Cramps" since yesterday. The pt was exposed to the outdoor heat for several hours on yesterday. Since, the pt has been experiencing body cramps. The pt rehydrated without relief of cramps     Emmanuel Grant is a 72 y.o. male with PMHx of DM, HTN, and CVA, who presents to the ED for evaluation of muscle cramps that began 2 days ago. Patient states he was installing an AC unit outdoors when the cramps began. States the cramps are intermittent since onset. They radiate from his left leg to his chest. He has tried drinking Gatorade and water to alleviate the symptoms with no relief. He is prescribed Metformin, and a HTN medication he cannot remember the name of. Denies associated NVD.    The history is provided by the patient. No  was used.   Review of patient's allergies indicates:   Allergen Reactions    Dapagliflozin      Other reaction(s): Other (See Comments)    Pcn [penicillins]     Lisinopril Other (See Comments)     cough     Past Medical History:   Diagnosis Date    Diabetes mellitus     Hypertension     Kidney stone     Stroke      Past Surgical History:   Procedure Laterality Date    SHOULDER SURGERY Right      History reviewed. No pertinent family history.  Social History     Tobacco Use    Smoking status: Never     Passive exposure: Never    Smokeless tobacco: Never   Substance Use Topics    Alcohol use: No     Review of Systems   Constitutional:  Negative for activity change, appetite change, chills and fever.   HENT:  Negative for congestion, rhinorrhea, sneezing and sore throat.    Respiratory:  Negative for cough, chest tightness, shortness of breath and " wheezing.    Cardiovascular:  Negative for chest pain and palpitations.   Gastrointestinal:  Negative for abdominal pain, diarrhea, nausea and vomiting.   Musculoskeletal:  Positive for myalgias.        (+) muscle cramps   Skin:  Negative for rash.   Neurological:  Negative for dizziness, light-headedness and headaches.   All other systems reviewed and are negative.    Physical Exam     Initial Vitals [07/20/23 1055]   BP Pulse Resp Temp SpO2   (!) 160/87 64 16 97.9 °F (36.6 °C) 98 %      MAP       --         Physical Exam    Nursing note and vitals reviewed.  Constitutional: He appears well-developed and well-nourished. No distress.   HENT:   Head: Normocephalic and atraumatic.   Eyes: Conjunctivae are normal.   Neck:   Normal range of motion.  Cardiovascular:  Normal rate and regular rhythm.           No murmur heard.  Pulmonary/Chest: Breath sounds normal. No respiratory distress.   Abdominal: Bowel sounds are normal. He exhibits no distension. There is no abdominal tenderness.   Musculoskeletal:         General: No tenderness or edema. Normal range of motion.      Cervical back: Normal range of motion.     Neurological: He is alert and oriented to person, place, and time.   Skin: Skin is warm and dry. No rash noted.   Psychiatric: He has a normal mood and affect. His behavior is normal.       ED Course   Procedures  Labs Reviewed   POCT URINALYSIS W/O SCOPE - Abnormal; Notable for the following components:       Result Value    Glucose, UA Trace (*)     Blood, UA 1+ (*)     Protein, UA 1+ (*)     All other components within normal limits   POCT GLUCOSE - Abnormal; Notable for the following components:    POCT Glucose 202 (*)     All other components within normal limits   POCT CMP - Abnormal; Notable for the following components:    POC BUN 25 (*)     POC Glucose 179 (*)     Protein, POC 8.7 (*)     All other components within normal limits   CK   POCT CBC   POCT URINALYSIS W/O SCOPE   POCT CMP   POCT GLUCOSE  MONITORING CONTINUOUS          Imaging Results    None          Medications   sodium chloride 0.9% bolus 1,000 mL 1,000 mL (0 mLs Intravenous Stopped 7/20/23 1417)   sodium chloride 0.9% bolus 1,000 mL 1,000 mL (1,000 mLs Intravenous New Bag 7/20/23 1418)     Medical Decision Making:   History:   Old Medical Records: I decided to obtain old medical records.  Clinical Tests:   Lab Tests: Ordered and Reviewed     Medical Decision Making  72 y.o. male with PMHx of DM, HTN, and CVA, who presents to the ED for evaluation of muscle cramps, symptoms began 2 days ago. Normal exam. In shared decision making with the patient, will order labs and give IVFs. I considered but excluded DKA, EKATERINA, electrolyte abnormality, infection. LAbs show hyperglycemia and mild dehydration - 2L NS given. Will discharge with muscle relaxer, instructions to continue more PO fluids, avoid outside heat until Monday, monitor glucose.     Amount and/or Complexity of Data Reviewed  Labs: ordered.    Risk  Prescription drug management.         Scribe Attestation:   Scribe #1: I performed the above scribed service and the documentation accurately describes the services I performed. I attest to the accuracy of the note.            I, Dr. Angela Aguilar, personally performed the services described in this documentation.   All medical record entries made by the scribe were at my direction and in my presence.   I have reviewed the chart and agree that the record is accurate and complete.   Angela Aguilar MD.  2:13 PM 07/20/2023          Clinical Impression:   Final diagnoses:  [R25.2] Muscle cramps (Primary)  [E86.0] Dehydration  [E11.65] Hyperglycemia due to diabetes mellitus               Angela Aguilar MD  07/20/23 2034

## 2023-09-02 ENCOUNTER — HOSPITAL ENCOUNTER (EMERGENCY)
Facility: HOSPITAL | Age: 73
Discharge: HOME | End: 2023-09-02
Attending: EMERGENCY MEDICINE
Payer: MEDICARE

## 2023-09-02 VITALS
WEIGHT: 150 LBS | HEART RATE: 77 BPM | SYSTOLIC BLOOD PRESSURE: 162 MMHG | TEMPERATURE: 98 F | DIASTOLIC BLOOD PRESSURE: 90 MMHG | HEIGHT: 68 IN | RESPIRATION RATE: 16 BRPM | OXYGEN SATURATION: 97 % | BODY MASS INDEX: 22.73 KG/M2

## 2023-09-02 DIAGNOSIS — R05.9 COUGH: ICD-10-CM

## 2023-09-02 DIAGNOSIS — B34.9 VIRAL SYNDROME: Primary | ICD-10-CM

## 2023-09-02 LAB
CTP QC/QA: YES
INFLUENZA A ANTIGEN, POC: NEGATIVE
INFLUENZA B ANTIGEN, POC: NEGATIVE
SARS-COV-2 RDRP RESP QL NAA+PROBE: NEGATIVE

## 2023-09-02 PROCEDURE — 87635 SARS-COV-2 COVID-19 AMP PRB: CPT | Mod: ER | Performed by: EMERGENCY MEDICINE

## 2023-09-02 PROCEDURE — 87804 INFLUENZA ASSAY W/OPTIC: CPT | Mod: ER

## 2023-09-02 PROCEDURE — 99283 EMERGENCY DEPT VISIT LOW MDM: CPT | Mod: 25,ER

## 2023-09-02 RX ORDER — GUAIFENESIN 100 MG/5ML
200 SOLUTION ORAL EVERY 4 HOURS PRN
Qty: 118 ML | Refills: 0 | Status: SHIPPED | OUTPATIENT
Start: 2023-09-02 | End: 2023-09-12

## 2023-09-02 RX ORDER — BENZONATATE 100 MG/1
100 CAPSULE ORAL 3 TIMES DAILY PRN
Qty: 30 CAPSULE | Refills: 0 | Status: SHIPPED | OUTPATIENT
Start: 2023-09-02 | End: 2023-09-12

## 2023-09-02 RX ORDER — ACETAMINOPHEN 500 MG
500 TABLET ORAL EVERY 4 HOURS PRN
Qty: 30 TABLET | Refills: 0 | Status: SHIPPED | OUTPATIENT
Start: 2023-09-02

## 2023-09-02 NOTE — DISCHARGE INSTRUCTIONS

## 2023-09-02 NOTE — ED PROVIDER NOTES
Encounter Date: 9/2/2023    SCRIBE #1 NOTE: I, Lan Obando, am scribing for, and in the presence of,  NAFISA Amador I have scribed the following portions of the note - Other sections scribed: ANTIONE CONLEY.   SCRIBE #2 NOTE: I, Lian Singh, am scribing for, and in the presence of,  Vicenta Barrett PA-C. I have scribed the following portions of the note - Other sections scribed: ANTIONE CONLEY.     History     Chief Complaint   Patient presents with    URI     Pt complains of cough and sneezing for about 5 days. Denies any other symptoms at this time. Denies taking otc medications.     Emmanuel Garnt is a 72 y.o. male, with a PMHx of DM, HTN, and Stroke, who presents to the ED with a dry cough and sneezing onset 5 days ago. Denies fever, chills, chest pains, SOB, abdominal pain, vomiting, nausea, diarrhea, sore throat, hemoptysis, dysphagia, nasal congestion, and rhinorrhea or other associated symptoms. Patient is currently on MetFORMIN 500mg QID, Carvedilol 25 mg BID, GlipiZIDE 10 mg BID. Patient reports he has not taken his daily medications today. He denies any sick contacts. No other symptoms reported.  No attempted treatment reported. Patient has allergy to penicillins.     The history is provided by the patient. No  was used.     Review of patient's allergies indicates:   Allergen Reactions    Dapagliflozin      Other reaction(s): Other (See Comments)    Pcn [penicillins]     Lisinopril Other (See Comments)     cough     Past Medical History:   Diagnosis Date    Diabetes mellitus     Hypertension     Kidney stone     Stroke      Past Surgical History:   Procedure Laterality Date    SHOULDER SURGERY Right      No family history on file.  Social History     Tobacco Use    Smoking status: Never     Passive exposure: Never    Smokeless tobacco: Never   Substance Use Topics    Alcohol use: No     Review of Systems   Constitutional:  Negative for chills and fever.   HENT:  Positive for sneezing.  Negative for congestion, ear pain, rhinorrhea, sore throat and trouble swallowing.    Eyes:  Negative for redness.   Respiratory:  Positive for cough (Nonproductive). Negative for shortness of breath.         (-) hemoptysis   Cardiovascular:  Negative for chest pain.   Gastrointestinal:  Negative for abdominal pain, diarrhea, nausea and vomiting.   Genitourinary:  Negative for decreased urine volume, difficulty urinating, dysuria, frequency, hematuria and urgency.   Musculoskeletal:  Negative for back pain and neck pain.   Skin:  Negative for rash.   Neurological:  Negative for headaches.   Psychiatric/Behavioral:  Negative for confusion.        Physical Exam     Initial Vitals [09/02/23 1025]   BP Pulse Resp Temp SpO2   (!) 166/96 74 16 98.4 °F (36.9 °C) 97 %      MAP       --         Physical Exam    Nursing note and vitals reviewed.  Constitutional: He appears well-developed and well-nourished. He is not diaphoretic.  Non-toxic appearance. No distress.   HENT:   Head: Normocephalic and atraumatic.   Right Ear: Hearing, tympanic membrane, external ear and ear canal normal. Tympanic membrane is not perforated, not erythematous and not bulging.   Left Ear: Hearing, tympanic membrane, external ear and ear canal normal. Tympanic membrane is not perforated, not erythematous and not bulging.   Nose: Nose normal.   Mouth/Throat: Uvula is midline and oropharynx is clear and moist.   Eyes: Conjunctivae and EOM are normal.   Neck: Neck supple.   Normal range of motion.   Full passive range of motion without pain.     Cardiovascular:            Pulses:       Radial pulses are 2+ on the right side and 2+ on the left side.   Pulmonary/Chest: Effort normal and breath sounds normal. No respiratory distress. He has no decreased breath sounds.   Abdominal: Abdomen is soft and flat. There is no abdominal tenderness.   No right CVA tenderness.  No left CVA tenderness. There is no rebound and no guarding.   Musculoskeletal:       Cervical back: Full passive range of motion without pain, normal range of motion and neck supple. No rigidity.     Neurological: He is alert. No cranial nerve deficit.   Neuro intact.  Strength and sensation intact to bilateral upper and lower extremities.   Skin: Skin is warm and dry. No rash noted.         ED Course   Procedures  Labs Reviewed   SARS-COV-2 RDRP GENE    Narrative:     This test utilizes isothermal nucleic acid amplification technology to detect the SARS-CoV-2 RdRp nucleic acid segment. The analytical sensitivity (limit of detection) is 500 copies/swab.     A POSITIVE result is indicative of the presence of SARS-CoV-2 RNA; clinical correlation with patient history and other diagnostic information is necessary to determine patient infection status.    A NEGATIVE result means that SARS-CoV-2 nucleic acids are not present above the limit of detection. A NEGATIVE result should be treated as presumptive. It does not rule out the possibility of COVID-19 and should not be the sole basis for treatment decisions. If COVID-19 is strongly suspected based on clinical and exposure history, re-testing using an alternate molecular assay should be considered.     This test is only for use under the Food and Drug Administration s Emergency Use Authorization (EUA).     Commercial kits are provided by [employer]. Performance characteristics of the EUA have been independently verified by [facility] Department of Pathology and Laboratory Medicine.   _________________________________________________________________   The authorized Fact Sheet for Healthcare Providers and the authorized Fact Sheet for Patients of the ID NOW COVID-19 are available on the FDA website:    https://www.fda.gov/media/312975/download      https://www.fda.gov/media/171074/download      POCT INFLUENZA A/B MOLECULAR   POCT RAPID INFLUENZA A/B          Imaging Results              X-Ray Chest PA And Lateral (Final result)  Result  time 09/02/23 11:17:24      Final result by Jus Garcia MD (09/02/23 11:17:24)                   Impression:      As above.      Electronically signed by: Jus Garcia MD  Date:    09/02/2023  Time:    11:17               Narrative:    EXAMINATION:  XR CHEST PA AND LATERAL    CLINICAL HISTORY:  Cough, unspecified    TECHNIQUE:  PA and lateral views of the chest were performed.    COMPARISON:  07/07/2023    FINDINGS:  There is bilateral, predominantly peripheral interstitial opacification, similar to prior study.  No new consolidation, pleural effusion, or pneumothorax.  Cardiomediastinal silhouette is unremarkable.  Regional osseous structures are unremarkable.                                       Medications - No data to display  Medical Decision Making  This is a 72 y.o. male, with a PMHx of DM, HTN, and Stroke, who presents to the ED with a dry cough and sneezing onset 5 days ago.On physical exam, patient is well-appearing and in no acute distress.  Nontoxic appearing.  Lungs are clear to auscultation bilaterally.  Abdomen is soft and nontender.  No guarding, rigidity, rebound.  2+ radial pulses bilaterally.  Posterior oropharynx is not erythematous.  No edema or exudate.  Uvula midline.  Bilateral tympanic membrane is normal.  No erythema, bulging, or perforations.  Neuro intact.  Strength and sensation intact bilateral upper and lower extremities.  Full range motion of neck pain. no neck rigidity.  Chest x-ray revealed There is bilateral, predominantly peripheral interstitial opacification, similar to prior study.  No new consolidation, pleural effusion, or pneumothorax.  Cardiomediastinal silhouette is unremarkable.  Regional osseous structures are unremarkable.   Will discharge patient on Tylenol, Robitussin, Tessalon Perles.  Encouraged the patient to drink lot of fluids upon discharge.  Urged prompt follow-up with PCP for further evaluation.    Strict return precautions given. I discussed with the  patient/family the diagnosis, treatment plan, indications for return to the emergency department, and for expected follow-up. The patient/family verbalized an understanding. The patient/family is asked if there are any questions or concerns. We discuss the case, until all issues are addressed to the patient/family's satisfaction. Patient/family understands and is agreeable to the plan. Patient is stable and ready for discharge.               Amount and/or Complexity of Data Reviewed  Labs: ordered.  Radiology: ordered.    Risk  OTC drugs.  Prescription drug management.            Scribe Attestation:   Scribe #1: I performed the above scribed service and the documentation accurately describes the services I performed. I attest to the accuracy of the note.  Scribe #2: I performed the above scribed service and the documentation accurately describes the services I performed. I attest to the accuracy of the note.                    I, Vicenta Barrett, personally performed the services described in this documentation.  All medical record entries made by the scribe were at my direction and in my presence.  I have reviewed the chart and agree that the record reflects my personal performance and is accurate and complete.     Clinical Impression:   Final diagnoses:  [R05.9] Cough  [B34.9] Viral syndrome (Primary)        ED Disposition Condition    Discharge Stable          ED Prescriptions       Medication Sig Dispense Start Date End Date Auth. Provider    acetaminophen (TYLENOL) 500 MG tablet Take 1 tablet (500 mg total) by mouth every 4 (four) hours as needed for Pain or Temperature greater than (100.5 or greater). 30 tablet 9/2/2023 -- Vicenta Barrett PA-C    benzonatate (TESSALON) 100 MG capsule Take 1 capsule (100 mg total) by mouth 3 (three) times daily as needed for Cough. 30 capsule 9/2/2023 9/12/2023 Vicenta Barrett PA-C    guaiFENesin 100 mg/5 ml (ROBITUSSIN) 100 mg/5 mL syrup Take 10 mLs (200 mg total) by mouth every  4 (four) hours as needed for Cough. 118 mL 9/2/2023 9/12/2023 Vicenta Barrett PA-C          Follow-up Information       Follow up With Specialties Details Why Contact Info    Payam Vu Jr., NP Internal Medicine In 2 days for further evaluation [address] [facility] [id number] [phone]      [facility] ED Emergency Medicine In 2 days If symptoms worsen [address] [facility] [address] [phone]             Vicenta Barrett PA-C  09/02/23 1150

## 2023-12-18 ENCOUNTER — OFFICE VISIT (OUTPATIENT)
Dept: GASTROENTEROLOGY | Facility: CLINIC | Age: 73
End: 2023-12-18
Payer: MEDICARE

## 2023-12-18 ENCOUNTER — TELEPHONE (OUTPATIENT)
Dept: GASTROENTEROLOGY | Facility: CLINIC | Age: 73
End: 2023-12-18

## 2023-12-18 VITALS
DIASTOLIC BLOOD PRESSURE: 85 MMHG | BODY MASS INDEX: 22.87 KG/M2 | WEIGHT: 150.88 LBS | HEIGHT: 68 IN | HEART RATE: 88 BPM | SYSTOLIC BLOOD PRESSURE: 153 MMHG

## 2023-12-18 DIAGNOSIS — K21.9 GASTROESOPHAGEAL REFLUX DISEASE, UNSPECIFIED WHETHER ESOPHAGITIS PRESENT: Primary | ICD-10-CM

## 2023-12-18 PROCEDURE — 99999 PR PBB SHADOW E&M-EST. PATIENT-LVL III: CPT | Mod: PBBFAC,,, | Performed by: INTERNAL MEDICINE

## 2023-12-18 PROCEDURE — 99999 PR PBB SHADOW E&M-EST. PATIENT-LVL III: ICD-10-PCS | Mod: PBBFAC,,, | Performed by: INTERNAL MEDICINE

## 2023-12-18 PROCEDURE — 1101F PR PT FALLS ASSESS DOC 0-1 FALLS W/OUT INJ PAST YR: ICD-10-PCS | Mod: CPTII,S$GLB,, | Performed by: INTERNAL MEDICINE

## 2023-12-18 PROCEDURE — 3077F PR MOST RECENT SYSTOLIC BLOOD PRESSURE >= 140 MM HG: ICD-10-PCS | Mod: CPTII,S$GLB,, | Performed by: INTERNAL MEDICINE

## 2023-12-18 PROCEDURE — 1126F AMNT PAIN NOTED NONE PRSNT: CPT | Mod: CPTII,S$GLB,, | Performed by: INTERNAL MEDICINE

## 2023-12-18 PROCEDURE — 3288F FALL RISK ASSESSMENT DOCD: CPT | Mod: CPTII,S$GLB,, | Performed by: INTERNAL MEDICINE

## 2023-12-18 PROCEDURE — 3008F BODY MASS INDEX DOCD: CPT | Mod: CPTII,S$GLB,, | Performed by: INTERNAL MEDICINE

## 2023-12-18 PROCEDURE — 99204 PR OFFICE/OUTPT VISIT, NEW, LEVL IV, 45-59 MIN: ICD-10-PCS | Mod: S$GLB,,, | Performed by: INTERNAL MEDICINE

## 2023-12-18 PROCEDURE — 1126F PR PAIN SEVERITY QUANTIFIED, NO PAIN PRESENT: ICD-10-PCS | Mod: CPTII,S$GLB,, | Performed by: INTERNAL MEDICINE

## 2023-12-18 PROCEDURE — 3044F PR MOST RECENT HEMOGLOBIN A1C LEVEL <7.0%: ICD-10-PCS | Mod: CPTII,S$GLB,, | Performed by: INTERNAL MEDICINE

## 2023-12-18 PROCEDURE — 1160F RVW MEDS BY RX/DR IN RCRD: CPT | Mod: CPTII,S$GLB,, | Performed by: INTERNAL MEDICINE

## 2023-12-18 PROCEDURE — 99204 OFFICE O/P NEW MOD 45 MIN: CPT | Mod: S$GLB,,, | Performed by: INTERNAL MEDICINE

## 2023-12-18 PROCEDURE — 3077F SYST BP >= 140 MM HG: CPT | Mod: CPTII,S$GLB,, | Performed by: INTERNAL MEDICINE

## 2023-12-18 PROCEDURE — 1160F PR REVIEW ALL MEDS BY PRESCRIBER/CLIN PHARMACIST DOCUMENTED: ICD-10-PCS | Mod: CPTII,S$GLB,, | Performed by: INTERNAL MEDICINE

## 2023-12-18 PROCEDURE — 3079F PR MOST RECENT DIASTOLIC BLOOD PRESSURE 80-89 MM HG: ICD-10-PCS | Mod: CPTII,S$GLB,, | Performed by: INTERNAL MEDICINE

## 2023-12-18 PROCEDURE — 3288F PR FALLS RISK ASSESSMENT DOCUMENTED: ICD-10-PCS | Mod: CPTII,S$GLB,, | Performed by: INTERNAL MEDICINE

## 2023-12-18 PROCEDURE — 3008F PR BODY MASS INDEX (BMI) DOCUMENTED: ICD-10-PCS | Mod: CPTII,S$GLB,, | Performed by: INTERNAL MEDICINE

## 2023-12-18 PROCEDURE — 3079F DIAST BP 80-89 MM HG: CPT | Mod: CPTII,S$GLB,, | Performed by: INTERNAL MEDICINE

## 2023-12-18 PROCEDURE — 1159F MED LIST DOCD IN RCRD: CPT | Mod: CPTII,S$GLB,, | Performed by: INTERNAL MEDICINE

## 2023-12-18 PROCEDURE — 3044F HG A1C LEVEL LT 7.0%: CPT | Mod: CPTII,S$GLB,, | Performed by: INTERNAL MEDICINE

## 2023-12-18 PROCEDURE — 1101F PT FALLS ASSESS-DOCD LE1/YR: CPT | Mod: CPTII,S$GLB,, | Performed by: INTERNAL MEDICINE

## 2023-12-18 PROCEDURE — 1159F PR MEDICATION LIST DOCUMENTED IN MEDICAL RECORD: ICD-10-PCS | Mod: CPTII,S$GLB,, | Performed by: INTERNAL MEDICINE

## 2023-12-18 RX ORDER — FAMOTIDINE 20 MG/1
20 TABLET, FILM COATED ORAL 2 TIMES DAILY
COMMUNITY
Start: 2023-12-09 | End: 2024-02-16

## 2023-12-18 NOTE — PROGRESS NOTES
Subjective:       Patient ID: Emmanuel Grant is a 73 y.o. male.    Chief Complaint: Gastroesophageal Reflux      HPI:   Gastroesophageal Reflux  He complains of chest pain and choking. regurgitation. This is a chronic problem. The current episode started more than 1 year ago. The problem occurs frequently. The problem has been unchanged. Nothing aggravates the symptoms. Pertinent negatives include no fatigue. Risk factors include caffeine use. He has tried a PPI and an antacid for the symptoms. The treatment provided moderate relief.   There is no dysphagia or melena.  Patient report itching in his throat on a certain PPI that had to be discontinued but he doesn't remember which one.    Review of Systems   Constitutional:  Negative for fatigue and fever.   Eyes:  Negative for visual disturbance.   Respiratory:  Positive for choking. Negative for shortness of breath.    Cardiovascular:  Positive for chest pain. Negative for palpitations.   Gastrointestinal:         See HPI for details   Genitourinary:  Negative for dysuria, frequency and hematuria.   Musculoskeletal:  Negative for arthralgias and myalgias.   Integumentary:  Negative for rash.   Neurological:  Negative for dizziness, weakness and light-headedness.   Psychiatric/Behavioral:  Negative for behavioral problems, confusion and suicidal ideas.          Objective:      Physical Exam  Eyes:      Pupils: Pupils are equal, round, and reactive to light.   Cardiovascular:      Rate and Rhythm: Normal rate and regular rhythm.      Heart sounds: Normal heart sounds.   Pulmonary:      Effort: No respiratory distress.      Breath sounds: No wheezing.   Abdominal:      Palpations: There is no mass.      Tenderness: There is no abdominal tenderness. There is no guarding or rebound.   Skin:     Findings: No rash.   Neurological:      Mental Status: He is alert and oriented to person, place, and time.   Psychiatric:         Thought Content: Thought content normal.          Assessment:       1. Gastroesophageal reflux disease, unspecified whether esophagitis present        Plan:       Emmanuel was seen today for gastroesophageal reflux.    Diagnoses and all orders for this visit:    Gastroesophageal reflux disease, unspecified whether esophagitis present  -     Case Request Endoscopy: ESOPHAGOGASTRODUODENOSCOPY (EGD)  -     NM Gastric Emptying; Future       Medical Record release Mr. Payam Menjivar and Dr. Peraza.  Follow an antireflux regimen.  This includes:       - Do not lie down for at least 3 to 4 hours after meals.        - Raise the head of the bed 4 to 6 inches.        - Decrease excess weight.        - Avoid citrus juices and other acidic foods, alcohol, chocolate, mints, coffee and other caffeinated beverages, carbonated beverages, fatty and fried foods.        - Avoid tight-fitting clothing.        - Avoid cigarettes and other tobacco products.

## 2024-01-08 ENCOUNTER — HOSPITAL ENCOUNTER (OUTPATIENT)
Dept: RADIOLOGY | Facility: HOSPITAL | Age: 74
Discharge: HOME | End: 2024-01-08
Attending: INTERNAL MEDICINE
Payer: MEDICARE

## 2024-01-08 DIAGNOSIS — K21.9 GASTROESOPHAGEAL REFLUX DISEASE, UNSPECIFIED WHETHER ESOPHAGITIS PRESENT: ICD-10-CM

## 2024-01-08 PROCEDURE — 78264 GASTRIC EMPTYING IMG STUDY: CPT | Mod: 26,,, | Performed by: RADIOLOGY

## 2024-01-08 PROCEDURE — A9541 TC99M SULFUR COLLOID: HCPCS

## 2024-01-09 LAB
LEFT EYE DM RETINOPATHY: NEGATIVE
RIGHT EYE DM RETINOPATHY: NEGATIVE

## 2024-01-17 ENCOUNTER — TELEPHONE (OUTPATIENT)
Dept: GASTROENTEROLOGY | Facility: CLINIC | Age: 74
End: 2024-01-17
Payer: MEDICARE

## 2024-01-17 ENCOUNTER — NURSE TRIAGE (OUTPATIENT)
Dept: ADMINISTRATIVE | Facility: CLINIC | Age: 74
End: 2024-01-17
Payer: MEDICARE

## 2024-01-17 NOTE — TELEPHONE ENCOUNTER
Attempted to contact pt x2, unable to leave a VM message.     Reason for Disposition   Second attempt to contact caller AND no contact made. Phone number verified.    Protocols used: No Contact or Duplicate Contact Call-A-AH

## 2024-01-17 NOTE — TELEPHONE ENCOUNTER
----- Message from Ariela Castro MD sent at 1/17/2024 10:09 AM CST -----  Normal gastric emptying.    Keep EGD appointment on 1/31

## 2024-01-29 ENCOUNTER — TELEPHONE (OUTPATIENT)
Dept: GASTROENTEROLOGY | Facility: CLINIC | Age: 74
End: 2024-01-29
Payer: MEDICARE

## 2024-02-02 ENCOUNTER — TELEPHONE (OUTPATIENT)
Dept: GASTROENTEROLOGY | Facility: CLINIC | Age: 74
End: 2024-02-02
Payer: MEDICARE

## 2024-02-16 ENCOUNTER — HOSPITAL ENCOUNTER (EMERGENCY)
Facility: HOSPITAL | Age: 74
Discharge: HOME | End: 2024-02-16
Attending: EMERGENCY MEDICINE
Payer: MEDICARE

## 2024-02-16 VITALS
HEART RATE: 82 BPM | SYSTOLIC BLOOD PRESSURE: 152 MMHG | BODY MASS INDEX: 22.99 KG/M2 | DIASTOLIC BLOOD PRESSURE: 84 MMHG | OXYGEN SATURATION: 96 % | TEMPERATURE: 98 F | HEIGHT: 68 IN | WEIGHT: 151.69 LBS | RESPIRATION RATE: 16 BRPM

## 2024-02-16 DIAGNOSIS — K21.9 GASTROESOPHAGEAL REFLUX DISEASE, UNSPECIFIED WHETHER ESOPHAGITIS PRESENT: ICD-10-CM

## 2024-02-16 DIAGNOSIS — J32.9 CHRONIC SINUSITIS, UNSPECIFIED LOCATION: ICD-10-CM

## 2024-02-16 DIAGNOSIS — J42 CHRONIC BRONCHITIS, UNSPECIFIED CHRONIC BRONCHITIS TYPE: Primary | ICD-10-CM

## 2024-02-16 DIAGNOSIS — J84.10 PULMONARY FIBROSIS: ICD-10-CM

## 2024-02-16 DIAGNOSIS — R05.3 CHRONIC COUGH: ICD-10-CM

## 2024-02-16 LAB
ALBUMIN SERPL-MCNC: 3.9 G/DL (ref 3.3–5.5)
ALLENS TEST: ABNORMAL
ALP SERPL-CCNC: 70 U/L (ref 42–141)
BILIRUB SERPL-MCNC: 0.8 MG/DL (ref 0.2–1.6)
BUN SERPL-MCNC: 10 MG/DL (ref 7–22)
CALCIUM SERPL-MCNC: 9.9 MG/DL (ref 8–10.3)
CHLORIDE SERPL-SCNC: 98 MMOL/L (ref 98–108)
CREAT SERPL-MCNC: 1 MG/DL (ref 0.6–1.2)
GLUCOSE SERPL-MCNC: 125 MG/DL (ref 73–118)
HCO3 UR-SCNC: 30 MMOL/L (ref 24–28)
HCT, POC: NORMAL
HGB, POC: NORMAL (ref 14–18)
LDH SERPL L TO P-CCNC: 4.53 MMOL/L (ref 0.5–2.2)
MCH, POC: NORMAL
MCHC, POC: NORMAL
MCV, POC: NORMAL
MPV, POC: NORMAL
PCO2 BLDA: 41.4 MMHG (ref 35–45)
PH SMN: 7.47 [PH] (ref 7.35–7.45)
PO2 BLDA: 65 MMHG (ref 40–60)
POC ALT (SGPT): 33 U/L (ref 10–47)
POC AST (SGOT): 29 U/L (ref 11–38)
POC B-TYPE NATRIURETIC PEPTIDE: 89 PG/ML (ref 0–100)
POC BE: 6 MMOL/L
POC PLATELET COUNT: NORMAL
POC SATURATED O2: 94 % (ref 95–100)
POC TCO2: 29 MMOL/L (ref 18–33)
POC TCO2: 31 MMOL/L (ref 24–29)
POTASSIUM BLD-SCNC: 4.6 MMOL/L (ref 3.6–5.1)
PROTEIN, POC: 7.6 G/DL (ref 6.4–8.1)
RBC, POC: NORMAL
RDW, POC: NORMAL
SAMPLE: ABNORMAL
SITE: ABNORMAL
SODIUM BLD-SCNC: 144 MMOL/L (ref 128–145)
WBC, POC: NORMAL

## 2024-02-16 PROCEDURE — 80053 COMPREHEN METABOLIC PANEL: CPT | Mod: ER

## 2024-02-16 PROCEDURE — 82803 BLOOD GASES ANY COMBINATION: CPT | Mod: ER

## 2024-02-16 PROCEDURE — 85025 COMPLETE CBC W/AUTO DIFF WBC: CPT | Mod: ER

## 2024-02-16 PROCEDURE — 96360 HYDRATION IV INFUSION INIT: CPT | Mod: ER

## 2024-02-16 PROCEDURE — 83880 ASSAY OF NATRIURETIC PEPTIDE: CPT | Mod: ER

## 2024-02-16 PROCEDURE — 25000003 PHARM REV CODE 250: Mod: ER | Performed by: EMERGENCY MEDICINE

## 2024-02-16 PROCEDURE — 99284 EMERGENCY DEPT VISIT MOD MDM: CPT | Mod: 25,ER

## 2024-02-16 RX ORDER — LORATADINE 10 MG/1
10 TABLET ORAL EVERY MORNING
Qty: 60 TABLET | Refills: 0 | Status: SHIPPED | OUTPATIENT
Start: 2024-02-16 | End: 2024-04-20

## 2024-02-16 RX ORDER — POLYETHYLENE GLYCOL 3350 17 G/17G
17 POWDER, FOR SOLUTION ORAL DAILY
Qty: 119 G | Refills: 0 | Status: SHIPPED | OUTPATIENT
Start: 2024-02-16 | End: 2024-03-17

## 2024-02-16 RX ORDER — MONTELUKAST SODIUM 5 MG/1
5 TABLET, CHEWABLE ORAL NIGHTLY
Qty: 30 TABLET | Refills: 0 | Status: SHIPPED | OUTPATIENT
Start: 2024-02-16 | End: 2024-03-17

## 2024-02-16 RX ORDER — ALBUTEROL SULFATE 90 UG/1
2 AEROSOL, METERED RESPIRATORY (INHALATION) EVERY 6 HOURS PRN
Qty: 18 G | Refills: 0 | Status: SHIPPED | OUTPATIENT
Start: 2024-02-16 | End: 2024-04-20

## 2024-02-16 RX ORDER — SYRING-NEEDL,DISP,INSUL,0.3 ML 29 G X1/2"
296 SYRINGE, EMPTY DISPOSABLE MISCELLANEOUS ONCE
Qty: 296 ML | Refills: 0 | Status: SHIPPED | OUTPATIENT
Start: 2024-02-16 | End: 2024-02-16

## 2024-02-16 RX ORDER — SUCRALFATE 1 G/1
1 TABLET ORAL
Qty: 120 TABLET | Refills: 0 | Status: SHIPPED | OUTPATIENT
Start: 2024-02-16 | End: 2024-03-17

## 2024-02-16 RX ORDER — FAMOTIDINE 20 MG/1
20 TABLET, FILM COATED ORAL 2 TIMES DAILY
Qty: 28 TABLET | Refills: 0 | Status: ON HOLD | OUTPATIENT
Start: 2024-02-16 | End: 2024-02-29 | Stop reason: HOSPADM

## 2024-02-16 RX ORDER — FLUTICASONE PROPIONATE 50 MCG
1 SPRAY, SUSPENSION (ML) NASAL DAILY
Qty: 15 G | Refills: 0 | Status: SHIPPED | OUTPATIENT
Start: 2024-02-16 | End: 2024-04-20

## 2024-02-16 RX ORDER — AZITHROMYCIN 250 MG/1
TABLET, FILM COATED ORAL
Qty: 6 TABLET | Refills: 0 | Status: SHIPPED | OUTPATIENT
Start: 2024-02-16 | End: 2024-02-21

## 2024-02-16 RX ADMIN — SODIUM CHLORIDE 500 ML: 9 INJECTION, SOLUTION INTRAVENOUS at 09:02

## 2024-02-16 NOTE — Clinical Note
"    [address]  [place] [address]  Phone: [phone]  Fax: [phone]      Return to School    Emmanuel Grant ([place]) was seen and treated in our emergency department on 2/16/2024.     COVID-19 is present in our communities across the state. There is limited testing for COVID at this time, so not all patients can be tested. In this situation, your employee meets the following criteria:    Emmanuel Grant has met the criteria for COVID-19 testing and has a POSITIVE result. He can return to school once they are asymptomatic for 24 hours without the use of fever reducing medications AND at least five days from the first positive result. A mask is recommended for 5 days post quarantine.     If you have any questions or concerns, or if I can be of further assistance, please do not hesitate to contact me.    Sincerely,         "

## 2024-02-17 NOTE — ED PROVIDER NOTES
Encounter Date: 2/16/2024    SCRIBE #1 NOTE: I, Juvenal Cox Do, am scribing for, and in the presence of,  Torsten Garnica MD. I have scribed the following portions of the note - Other sections scribed: HPI, ROS, PE.       History     Chief Complaint   Patient presents with    Cough     C/o excessive coughing x1 month. Pt reports high blood pressure due to excessive coughing. /97     73 year old male with GERD, BPH, HTN, T2DM, MI, kidney stones, and stroke, presents to the ED with complaints of chronic constant cough onset multiple months ago. Patient endorses dry or productive clear cough with episodes of GERD or eating. Patient reports associated burning chest pain, mild abdominal chest pain, intermittent SOB with moving up steps, left-sided weakness, congestion, and chronic intermittent constipation. Patient tolerates PO. Patient denies caffeinated fluids. He endorses milk and water. Patient notes his LBM was soft this morning. He notes belching with speech. He denies passing gas. He endorses recent travel 6 months ago. Patient reports multiple PCP visits. Patient notes uncontrolled GERD with his last dose about 1 year ago. He notes compliance with daily baby aspirin. Patient denies trouble swallowing, postnasal drip, rhinorrhea, diarrhea, decreased appetite, dysuria, and hematuria.He denies smoking or EtOH. He denies previous abdominal surgery. Patient endorses a cardiac stent.     Per chart review 12/28/23  He complains of chest pain and choking. regurgitation. This is a chronic problem. The current episode started more than 1 year ago. The problem occurs frequently. He has tried a PPI and an antacid for the symptoms. The treatment provided moderate relief. There is no dysphagia or melena. Patient report itching in his throat on a certain PPI that had to be discontinued but he doesn't remember which one.    The history is provided by the patient. No  was used.     Review of patient's  "allergies indicates:   Allergen Reactions    Dapagliflozin      Other reaction(s): Other (See Comments)    Pcn [penicillins]     Linagliptin Other (See Comments)     "it knocked me down", "it almost killed me"    Lisinopril Other (See Comments)     cough    Pantoprazole Hives     Past Medical History:   Diagnosis Date    Diabetes mellitus     Hypertension     Kidney stone     Stroke      Past Surgical History:   Procedure Laterality Date    SHOULDER SURGERY Right      Family History   Problem Relation Age of Onset    Colon cancer Sister     Esophageal cancer Neg Hx      Social History     Tobacco Use    Smoking status: Never     Passive exposure: Never    Smokeless tobacco: Never   Substance Use Topics    Alcohol use: No    Drug use: Never     Review of Systems   Constitutional:  Negative for appetite change.   HENT:  Positive for congestion. Negative for postnasal drip, rhinorrhea and trouble swallowing.    Respiratory:  Positive for cough and shortness of breath.    Cardiovascular:  Positive for chest pain.   Gastrointestinal:  Positive for abdominal pain and constipation. Negative for diarrhea.   Genitourinary:  Negative for dysuria, frequency, hematuria and urgency.   All other systems reviewed and are negative.      Physical Exam     Initial Vitals [02/16/24 1918]   BP Pulse Resp Temp SpO2   (!) 147/97 109 20 97.7 °F (36.5 °C) 95 %      MAP       --         Physical Exam    Nursing note and vitals reviewed.  Constitutional: He appears well-developed and well-nourished.  Non-toxic appearance. He does not appear ill.   HENT:   Head: Normocephalic and atraumatic.   Mouth/Throat: Mucous membranes are dry.   No drooling.    Eyes: Conjunctivae are normal.   Neck: Trachea normal. Neck supple. No stridor present.   Normal range of motion.  Cardiovascular:  Normal rate, regular rhythm and normal heart sounds.           No murmur heard.  Pulmonary/Chest: Effort normal. No accessory muscle usage or stridor. No tachypnea. " No respiratory distress. He has rales in the right upper field, the right lower field, the left upper field and the left lower field.   Abdominal: He exhibits distension. Bowel sounds are increased. There is no abdominal tenderness. There is no rebound and no guarding.   Musculoskeletal:         General: No tenderness or edema. Normal range of motion.      Cervical back: Normal range of motion and neck supple. No edema, erythema or rigidity. Normal range of motion.      Right upper leg: No swelling or edema.      Left upper leg: No swelling or edema.      Right lower leg: No swelling. No edema.      Left lower leg: No swelling. No edema.     Neurological: He is alert.   Skin: Skin is warm and dry. Capillary refill takes less than 2 seconds. No rash noted. No erythema. No pallor.         ED Course   Procedures  Labs Reviewed   ISTAT PROCEDURE - Abnormal; Notable for the following components:       Result Value    POC PH 7.467 (*)     POC PO2 65 (*)     POC HCO3 30.0 (*)     POC BE 6 (*)     POC Lactate 4.53 (*)     POC TCO2 31 (*)     All other components within normal limits   POCT CMP - Abnormal; Notable for the following components:    POC Glucose 125 (*)     All other components within normal limits   POCT CBC   POCT CMP   POCT B-TYPE NATRIURETIC PEPTIDE (BNP)   POCT B-TYPE NATRIURETIC PEPTIDE (BNP)          Imaging Results              X-Ray Chest PA And Lateral (Final result)  Result time 02/16/24 21:21:05      Final result by Elidia Dent MD (02/16/24 21:21:05)                   Impression:      Coarse interstitial lung markings.  Findings concerning for pulmonary fibrosis.      Electronically signed by: Elidia Dent  Date:    02/16/2024  Time:    21:21               Narrative:    EXAMINATION:  CHEST PA AND LATERAL    CLINICAL HISTORY:  Chronic cough    TECHNIQUE:  PA and lateral chest radiograph    COMPARISON:  09/02/2023    FINDINGS:  The cardiac silhouette is within normal limits. Coarse  interstitial lung markings are present.  There are no new areas of focal consolidation.  There is no pneumothorax or pleural effusion.  Spondylitic changes are present.                                       Medications   sodium chloride 0.9% bolus 500 mL 500 mL (0 mLs Intravenous Stopped 2/16/24 2248)     Medical Decision Making  Amount and/or Complexity of Data Reviewed  External Data Reviewed: notes.     Details: See HPI.  Labs: ordered.  Radiology: ordered.    Risk  OTC drugs.  Prescription drug management.    Labs Reviewed  Pertinent lab and imaging findings include:  White count 12.3, H&H 15 and 44, lactic acid 4.53, hyperglycemia glucose 125, anion gap 17 serum pH 7.46, BNP 89,  chest x-ray with coarse interstitial markings consistent with pulmonary fibrosis,    Admission on 02/16/2024, Discharged on 02/16/2024   Component Date Value Ref Range Status    POC PH 02/16/2024 7.467 (H)  7.35 - 7.45 Final    POC PCO2 02/16/2024 41.4  35 - 45 mmHg Final    POC PO2 02/16/2024 65 (HH)  40 - 60 mmHg Final    POC HCO3 02/16/2024 30.0 (H)  24 - 28 mmol/L Final    POC BE 02/16/2024 6 (H)  -2 to 2 mmol/L Final    POC SATURATED O2 02/16/2024 94  95 - 100 % Final    POC Lactate 02/16/2024 4.53 (HH)  0.5 - 2.2 mmol/L Final    POC TCO2 02/16/2024 31 (H)  24 - 29 mmol/L Final    Sample 02/16/2024 VENOUS   Final    Site 02/16/2024 Other   Final    Allens Test 02/16/2024 N/A   Final    POC B-Type Natriuretic Peptide 02/16/2024 89.0  0.0 - 100.0 pg/mL Final    Albumin, POC 02/16/2024 3.9  3.3 - 5.5 g/dL Final    Alkaline Phosphatase, POC 02/16/2024 70  42 - 141 U/L Final    ALT (SGPT), POC 02/16/2024 33  10 - 47 U/L Final    AST (SGOT), POC 02/16/2024 29  11 - 38 U/L Final    POC BUN 02/16/2024 10  7 - 22 mg/dL Final    Calcium, POC 02/16/2024 9.9  8.0 - 10.3 mg/dL Final    POC Chloride 02/16/2024 98  98 - 108 mmol/L Final    POC Creatinine 02/16/2024 1.0  0.6 - 1.2 mg/dL Final    POC Glucose 02/16/2024 125 (H)  73 - 118 mg/dL  Final    POC Potassium 02/16/2024 4.6  3.6 - 5.1 mmol/L Final    POC Sodium 02/16/2024 144  128 - 145 mmol/L Final    Bilirubin, POC 02/16/2024 0.8  0.2 - 1.6 mg/dL Final    POC TCO2 02/16/2024 29  18 - 33 mmol/L Final    Protein, POC 02/16/2024 7.6  6.4 - 8.1 g/dL Final        Imaging Reviewed    Imaging Results              X-Ray Chest PA And Lateral (Final result)  Result time 02/16/24 21:21:05      Final result by Elidia Dent MD (02/16/24 21:21:05)                   Impression:      Coarse interstitial lung markings.  Findings concerning for pulmonary fibrosis.      Electronically signed by: Elidia Dent  Date:    02/16/2024  Time:    21:21               Narrative:    EXAMINATION:  CHEST PA AND LATERAL    CLINICAL HISTORY:  Chronic cough    TECHNIQUE:  PA and lateral chest radiograph    COMPARISON:  09/02/2023    FINDINGS:  The cardiac silhouette is within normal limits. Coarse interstitial lung markings are present.  There are no new areas of focal consolidation.  There is no pneumothorax or pleural effusion.  Spondylitic changes are present.                                      Medications given in ED    Medications   sodium chloride 0.9% bolus 500 mL 500 mL (0 mLs Intravenous Stopped 2/16/24 3908)         Note was created using voice recognition software. Note may have occasional typographical errors that may not have been identified and edited despite good doretha initial review prior to signing.    I, Torsten Garnica MD, personally performed the services described in this documentation. All medical record entries made by the scribe were at my direction and in my presence.  I have reviewed the chart and agree that the record reflects my personal performance and is accurate and complete.            Scribe Attestation:   Scribe #1: I performed the above scribed service and the documentation accurately describes the services I performed. I attest to the accuracy of the note.                   Medical  Decision Making:   Initial Assessment:   73 year old male with GERD, BPH, HTN, T2DM, MI, kidney stones, and stroke, presents to the ED with complaints of chronic constant cough and GERD onset multiple months ago.  Patient afebrile, well-appearing, no apparent distress, minimally elevated blood pressure without tachycardia, tachypnea, respiratory distress or hypoxia.  There is no stridor, drooling, muffled phonation or throat swelling.  Breath sounds with rales bilaterally without lower extremity edema.  Differential Diagnosis:   DDx chronic cough: URI, bronchitis, COPD exacerbation, pneumonia, decompensated heart failure, GERD, postnasal drip,  adverse reaction to ACEi, others  Clinical Tests:   Lab Tests: Ordered and Reviewed  Radiological Study: Ordered and Reviewed  ED Management:  Test results, imaging results, outpatient management plan, outpatient PCP/pulmonology follow-up and ED return precautions discussed with patient with understanding and agreement.             Clinical Impression:  Final diagnoses:  [R05.3] Chronic cough  [J84.10] Pulmonary fibrosis  [J42] Chronic bronchitis, unspecified chronic bronchitis type (Primary)  [K21.9] Gastroesophageal reflux disease, unspecified whether esophagitis present  [J32.9] Chronic sinusitis, unspecified location          ED Disposition Condition    Discharge Stable          ED Prescriptions       Medication Sig Dispense Start Date End Date Auth. Provider    famotidine (PEPCID) 20 MG tablet Take 1 tablet (20 mg total) by mouth 2 (two) times daily. 28 tablet 2024 3/17/2024 Torsten Garnica MD    polyethylene glycol (GLYCOLAX) 17 gram/dose powder Take 17 g by mouth once daily. Take daily to prevent constipation 119 g 2024 3/17/2024 Torsten Garnica MD    magnesium citrate solution () Take 296 mLs by mouth once. Take once for bowel cleanse for 1 dose 296 mL 2024 Torsten Garnica MD    fluticasone propionate (FLONASE) 50 mcg/actuation nasal  spray 1 spray (50 mcg total) by Each Nostril route once daily. 15 g 2024 -- Torsten Garnica MD    montelukast (SINGULAIR) 5 MG chewable tablet Take 1 tablet (5 mg total) by mouth every evening. 30 tablet 2024 3/17/2024 Torsten Garnica MD    loratadine (CLARITIN) 10 mg tablet Take 1 tablet (10 mg total) by mouth every morning. 60 tablet 2024 2/15/2025 Torsten Garnica MD    azithromycin (Z-ARA) 250 MG tablet () Take 2 tablets (500 mg total) by mouth once daily for 1 day, THEN 1 tablet (250 mg total) once daily for 4 days. 6 tablet 2024 Torsten Garnica MD    albuterol (PROVENTIL/VENTOLIN HFA) 90 mcg/actuation inhaler Inhale 2 puffs into the lungs every 6 (six) hours as needed for Wheezing or Shortness of Breath. 18 g 2024 -- Torsten Garnica MD    sucralfate (CARAFATE) 1 gram tablet Take 1 tablet (1 g total) by mouth 4 (four) times daily before meals and nightly. 120 tablet 2024 3/17/2024 Torsten Garnica MD          Follow-up Information       Follow up With Specialties Details Why Contact Info Additional Information    The nearest emergency department.  Go to  As needed, If symptoms worsen      Your PCP  Call on 2024 As needed, for ongoing care      [facility] Call  Monday morning, to schedule an appointment, for re-evaluation of today's complaint [id number]  [facility] [id number] [phone] Main Building, [address]th Floor Please park in [facility] and use Clinic elevator             Torsten Garnica MD  24

## 2024-02-17 NOTE — ED NOTES
"Pt is anxious upon evaluation by RN. Verbalizes that he "feels terrible and my body is very sick" for "awhile". MD notified. Pt on BP/Sp02 monitoring at this time.   "

## 2024-02-28 ENCOUNTER — HOSPITAL ENCOUNTER (INPATIENT)
Facility: HOSPITAL | Age: 74
LOS: 1 days | Discharge: HOME | DRG: 197 | End: 2024-02-29
Attending: EMERGENCY MEDICINE | Admitting: INTERNAL MEDICINE
Payer: MEDICARE

## 2024-02-28 DIAGNOSIS — R07.9 CHEST PAIN: ICD-10-CM

## 2024-02-28 DIAGNOSIS — R09.02 HYPOXIA: Primary | ICD-10-CM

## 2024-02-28 DIAGNOSIS — R06.02 SOB (SHORTNESS OF BREATH): ICD-10-CM

## 2024-02-28 PROBLEM — J84.9 INTERSTITIAL LUNG DISEASE: Status: ACTIVE | Noted: 2024-02-28

## 2024-02-28 PROBLEM — N40.0 BPH (BENIGN PROSTATIC HYPERPLASIA): Status: ACTIVE | Noted: 2024-02-28

## 2024-02-28 PROBLEM — J96.01 ACUTE HYPOXIC RESPIRATORY FAILURE: Status: ACTIVE | Noted: 2024-02-28

## 2024-02-28 PROBLEM — I25.10 CORONARY ARTERY DISEASE: Status: ACTIVE | Noted: 2024-02-28

## 2024-02-28 PROBLEM — E11.65 TYPE 2 DIABETES MELLITUS WITH HYPERGLYCEMIA: Status: ACTIVE | Noted: 2024-02-28

## 2024-02-28 LAB
ALBUMIN SERPL BCP-MCNC: 3.5 G/DL (ref 3.5–5.2)
ALP SERPL-CCNC: 61 U/L (ref 55–135)
ALT SERPL W/O P-5'-P-CCNC: 46 U/L (ref 10–44)
ANION GAP SERPL CALC-SCNC: 8 MMOL/L (ref 8–16)
AST SERPL-CCNC: 25 U/L (ref 10–40)
BASOPHILS # BLD AUTO: 0.02 K/UL (ref 0–0.2)
BASOPHILS NFR BLD: 0.2 % (ref 0–1.9)
BILIRUB SERPL-MCNC: 0.6 MG/DL (ref 0.1–1)
BNP SERPL-MCNC: 51 PG/ML (ref 0–99)
BUN SERPL-MCNC: 16 MG/DL (ref 8–23)
CALCIUM SERPL-MCNC: 9.1 MG/DL (ref 8.7–10.5)
CHLORIDE SERPL-SCNC: 104 MMOL/L (ref 95–110)
CO2 SERPL-SCNC: 25 MMOL/L (ref 23–29)
CREAT SERPL-MCNC: 1.3 MG/DL (ref 0.5–1.4)
CRP SERPL-MCNC: 4.3 MG/L (ref 0–8.2)
D DIMER PPP IA.FEU-MCNC: 0.23 MG/L FEU
DIFFERENTIAL METHOD BLD: ABNORMAL
EOSINOPHIL # BLD AUTO: 1 K/UL (ref 0–0.5)
EOSINOPHIL NFR BLD: 11.6 % (ref 0–8)
ERYTHROCYTE [DISTWIDTH] IN BLOOD BY AUTOMATED COUNT: 13.3 % (ref 11.5–14.5)
EST. GFR  (NO RACE VARIABLE): 59 ML/MIN/1.73 M^2
GLUCOSE SERPL-MCNC: 134 MG/DL (ref 70–110)
HCT VFR BLD AUTO: 41 % (ref 40–54)
HGB BLD-MCNC: 14.2 G/DL (ref 14–18)
IMM GRANULOCYTES # BLD AUTO: 0.04 K/UL (ref 0–0.04)
IMM GRANULOCYTES NFR BLD AUTO: 0.5 % (ref 0–0.5)
LYMPHOCYTES # BLD AUTO: 2.2 K/UL (ref 1–4.8)
LYMPHOCYTES NFR BLD: 25.5 % (ref 18–48)
MCH RBC QN AUTO: 28.7 PG (ref 27–31)
MCHC RBC AUTO-ENTMCNC: 34.6 G/DL (ref 32–36)
MCV RBC AUTO: 83 FL (ref 82–98)
MONOCYTES # BLD AUTO: 0.7 K/UL (ref 0.3–1)
MONOCYTES NFR BLD: 7.5 % (ref 4–15)
NEUTROPHILS # BLD AUTO: 4.8 K/UL (ref 1.8–7.7)
NEUTROPHILS NFR BLD: 54.7 % (ref 38–73)
NRBC BLD-RTO: 0 /100 WBC
PLATELET # BLD AUTO: 154 K/UL (ref 150–450)
PMV BLD AUTO: 10.2 FL (ref 9.2–12.9)
POTASSIUM SERPL-SCNC: 4.7 MMOL/L (ref 3.5–5.1)
PROT SERPL-MCNC: 7.2 G/DL (ref 6–8.4)
RBC # BLD AUTO: 4.95 M/UL (ref 4.6–6.2)
SODIUM SERPL-SCNC: 137 MMOL/L (ref 136–145)
TROPONIN I SERPL DL<=0.01 NG/ML-MCNC: 0.01 NG/ML (ref 0–0.03)
TROPONIN I SERPL DL<=0.01 NG/ML-MCNC: <0.006 NG/ML (ref 0–0.03)
WBC # BLD AUTO: 8.68 K/UL (ref 3.9–12.7)

## 2024-02-28 PROCEDURE — 93005 ELECTROCARDIOGRAM TRACING: CPT

## 2024-02-28 PROCEDURE — 83880 ASSAY OF NATRIURETIC PEPTIDE: CPT | Performed by: EMERGENCY MEDICINE

## 2024-02-28 PROCEDURE — 85379 FIBRIN DEGRADATION QUANT: CPT | Performed by: EMERGENCY MEDICINE

## 2024-02-28 PROCEDURE — 94640 AIRWAY INHALATION TREATMENT: CPT

## 2024-02-28 PROCEDURE — 96366 THER/PROPH/DIAG IV INF ADDON: CPT

## 2024-02-28 PROCEDURE — 96365 THER/PROPH/DIAG IV INF INIT: CPT

## 2024-02-28 PROCEDURE — 63600175 PHARM REV CODE 636 W HCPCS: Performed by: INTERNAL MEDICINE

## 2024-02-28 PROCEDURE — 86140 C-REACTIVE PROTEIN: CPT | Performed by: INTERNAL MEDICINE

## 2024-02-28 PROCEDURE — 93010 ELECTROCARDIOGRAM REPORT: CPT | Mod: ,,, | Performed by: INTERNAL MEDICINE

## 2024-02-28 PROCEDURE — 25000242 PHARM REV CODE 250 ALT 637 W/ HCPCS: Performed by: EMERGENCY MEDICINE

## 2024-02-28 PROCEDURE — 99285 EMERGENCY DEPT VISIT HI MDM: CPT | Mod: 25

## 2024-02-28 PROCEDURE — 80053 COMPREHEN METABOLIC PANEL: CPT | Performed by: EMERGENCY MEDICINE

## 2024-02-28 PROCEDURE — 84484 ASSAY OF TROPONIN QUANT: CPT | Mod: 91 | Performed by: EMERGENCY MEDICINE

## 2024-02-28 PROCEDURE — 36415 COLL VENOUS BLD VENIPUNCTURE: CPT | Performed by: INTERNAL MEDICINE

## 2024-02-28 PROCEDURE — 94760 N-INVAS EAR/PLS OXIMETRY 1: CPT

## 2024-02-28 PROCEDURE — 25000003 PHARM REV CODE 250: Performed by: INTERNAL MEDICINE

## 2024-02-28 PROCEDURE — 63600175 PHARM REV CODE 636 W HCPCS: Performed by: EMERGENCY MEDICINE

## 2024-02-28 PROCEDURE — 21400001 HC TELEMETRY ROOM

## 2024-02-28 PROCEDURE — 85025 COMPLETE CBC W/AUTO DIFF WBC: CPT | Performed by: EMERGENCY MEDICINE

## 2024-02-28 RX ORDER — SODIUM CHLORIDE 0.9 % (FLUSH) 0.9 %
10 SYRINGE (ML) INJECTION EVERY 12 HOURS PRN
Status: DISCONTINUED | OUTPATIENT
Start: 2024-02-28 | End: 2024-02-29 | Stop reason: HOSPADM

## 2024-02-28 RX ORDER — IBUPROFEN 200 MG
16 TABLET ORAL
Status: DISCONTINUED | OUTPATIENT
Start: 2024-02-28 | End: 2024-02-29 | Stop reason: HOSPADM

## 2024-02-28 RX ORDER — GLUCAGON 1 MG
1 KIT INJECTION
Status: DISCONTINUED | OUTPATIENT
Start: 2024-02-28 | End: 2024-02-29 | Stop reason: HOSPADM

## 2024-02-28 RX ORDER — ALBUTEROL SULFATE 2.5 MG/.5ML
2.5 SOLUTION RESPIRATORY (INHALATION) EVERY 6 HOURS PRN
Status: DISCONTINUED | OUTPATIENT
Start: 2024-02-28 | End: 2024-02-29

## 2024-02-28 RX ORDER — ENOXAPARIN SODIUM 100 MG/ML
40 INJECTION SUBCUTANEOUS EVERY 24 HOURS
Status: DISCONTINUED | OUTPATIENT
Start: 2024-02-28 | End: 2024-02-29 | Stop reason: HOSPADM

## 2024-02-28 RX ORDER — ALBUTEROL SULFATE 2.5 MG/.5ML
2.5 SOLUTION RESPIRATORY (INHALATION)
Status: COMPLETED | OUTPATIENT
Start: 2024-02-28 | End: 2024-02-28

## 2024-02-28 RX ORDER — FAMOTIDINE 20 MG/1
20 TABLET, FILM COATED ORAL DAILY
Status: DISCONTINUED | OUTPATIENT
Start: 2024-02-29 | End: 2024-02-29 | Stop reason: HOSPADM

## 2024-02-28 RX ORDER — INSULIN ASPART 100 [IU]/ML
0-5 INJECTION, SOLUTION INTRAVENOUS; SUBCUTANEOUS
Status: DISCONTINUED | OUTPATIENT
Start: 2024-02-28 | End: 2024-02-29 | Stop reason: HOSPADM

## 2024-02-28 RX ORDER — NALOXONE HCL 0.4 MG/ML
0.02 VIAL (ML) INJECTION
Status: DISCONTINUED | OUTPATIENT
Start: 2024-02-28 | End: 2024-02-29 | Stop reason: HOSPADM

## 2024-02-28 RX ORDER — ONDANSETRON HYDROCHLORIDE 2 MG/ML
4 INJECTION, SOLUTION INTRAVENOUS EVERY 8 HOURS PRN
Status: DISCONTINUED | OUTPATIENT
Start: 2024-02-28 | End: 2024-02-29 | Stop reason: HOSPADM

## 2024-02-28 RX ORDER — NAPROXEN SODIUM 220 MG/1
81 TABLET, FILM COATED ORAL DAILY
Status: DISCONTINUED | OUTPATIENT
Start: 2024-02-29 | End: 2024-02-29 | Stop reason: HOSPADM

## 2024-02-28 RX ORDER — IBUPROFEN 200 MG
24 TABLET ORAL
Status: DISCONTINUED | OUTPATIENT
Start: 2024-02-28 | End: 2024-02-29 | Stop reason: HOSPADM

## 2024-02-28 RX ORDER — CARVEDILOL 12.5 MG/1
25 TABLET ORAL 2 TIMES DAILY WITH MEALS
Status: DISCONTINUED | OUTPATIENT
Start: 2024-02-28 | End: 2024-02-29 | Stop reason: HOSPADM

## 2024-02-28 RX ORDER — TAMSULOSIN HYDROCHLORIDE 0.4 MG/1
0.4 CAPSULE ORAL DAILY
Status: DISCONTINUED | OUTPATIENT
Start: 2024-02-29 | End: 2024-02-29 | Stop reason: HOSPADM

## 2024-02-28 RX ORDER — CETIRIZINE HYDROCHLORIDE 5 MG/1
5 TABLET ORAL DAILY
Status: DISCONTINUED | OUTPATIENT
Start: 2024-02-29 | End: 2024-02-29 | Stop reason: HOSPADM

## 2024-02-28 RX ORDER — IPRATROPIUM BROMIDE AND ALBUTEROL SULFATE 2.5; .5 MG/3ML; MG/3ML
3 SOLUTION RESPIRATORY (INHALATION)
Status: COMPLETED | OUTPATIENT
Start: 2024-02-28 | End: 2024-02-28

## 2024-02-28 RX ORDER — AMLODIPINE BESYLATE 5 MG/1
10 TABLET ORAL DAILY
Status: DISCONTINUED | OUTPATIENT
Start: 2024-02-29 | End: 2024-02-29 | Stop reason: HOSPADM

## 2024-02-28 RX ORDER — LABETALOL HYDROCHLORIDE 5 MG/ML
10 INJECTION, SOLUTION INTRAVENOUS
Status: DISCONTINUED | OUTPATIENT
Start: 2024-02-28 | End: 2024-02-29 | Stop reason: HOSPADM

## 2024-02-28 RX ORDER — MONTELUKAST SODIUM 5 MG/1
5 TABLET, CHEWABLE ORAL NIGHTLY
Status: DISCONTINUED | OUTPATIENT
Start: 2024-02-28 | End: 2024-02-29 | Stop reason: HOSPADM

## 2024-02-28 RX ORDER — PRAVASTATIN SODIUM 40 MG/1
40 TABLET ORAL DAILY
Status: DISCONTINUED | OUTPATIENT
Start: 2024-02-29 | End: 2024-02-29 | Stop reason: HOSPADM

## 2024-02-28 RX ORDER — PREDNISONE 20 MG/1
60 TABLET ORAL
Status: COMPLETED | OUTPATIENT
Start: 2024-02-28 | End: 2024-02-28

## 2024-02-28 RX ORDER — FLUTICASONE PROPIONATE 50 MCG
1 SPRAY, SUSPENSION (ML) NASAL DAILY
Status: DISCONTINUED | OUTPATIENT
Start: 2024-02-29 | End: 2024-02-29 | Stop reason: HOSPADM

## 2024-02-28 RX ADMIN — ALBUTEROL SULFATE 2.5 MG: 2.5 SOLUTION RESPIRATORY (INHALATION) at 06:02

## 2024-02-28 RX ADMIN — PREDNISONE 60 MG: 20 TABLET ORAL at 06:02

## 2024-02-28 RX ADMIN — LABETALOL HYDROCHLORIDE 10 MG: 5 INJECTION, SOLUTION INTRAVENOUS at 07:02

## 2024-02-28 RX ADMIN — ENOXAPARIN SODIUM 40 MG: 40 INJECTION SUBCUTANEOUS at 10:02

## 2024-02-28 RX ADMIN — CARVEDILOL 25 MG: 12.5 TABLET, FILM COATED ORAL at 10:02

## 2024-02-28 RX ADMIN — MONTELUKAST SODIUM 5 MG: 5 TABLET, CHEWABLE ORAL at 10:02

## 2024-02-28 RX ADMIN — IPRATROPIUM BROMIDE AND ALBUTEROL SULFATE 3 ML: 2.5; .5 SOLUTION RESPIRATORY (INHALATION) at 04:02

## 2024-02-28 NOTE — ED PROVIDER NOTES
"Encounter Date: 2/28/2024       History     Chief Complaint   Patient presents with    Fatigue     Presents to the ED via EMS with c/o fatigue and SOB x 3 days. Reports has had multiple tests ran but no answers. Reports night sweats, LE swelling and SOB when laying flat.      73-year-old male with a history of diabetes, MI, coronary artery disease, interstitial lung disease presenting with shortness of breath, fatigue, chest pain pressure.  Patient recently presented to [facility] for he was reportedly admitted for chest pain and ACS rule out.  Patient states he was discharged but was never told what was wrong with him.  He reports he has "a lung problem" but does not know more about it.  He reports he was never told anything as to what is wrong with him and he wants to feel better.  Denies fevers.  Notes cough.  Denies current chest pain or abdominal pain.  Denies any treatments prior to arrival.      Review of patient's allergies indicates:   Allergen Reactions    Dapagliflozin      Other reaction(s): Other (See Comments)    Pcn [penicillins]     Linagliptin Other (See Comments)     "it knocked me down", "it almost killed me"    Lisinopril Other (See Comments)     cough    Pantoprazole Hives     Past Medical History:   Diagnosis Date    Diabetes mellitus     Hypertension     Kidney stone     Stroke      Past Surgical History:   Procedure Laterality Date    SHOULDER SURGERY Right      Family History   Problem Relation Age of Onset    Colon cancer Sister     Esophageal cancer Neg Hx      Social History     Tobacco Use    Smoking status: Never     Passive exposure: Never    Smokeless tobacco: Never   Substance Use Topics    Alcohol use: No    Drug use: Never     Review of Systems   Constitutional:  Negative for chills and fever.   HENT:  Negative for sore throat.    Respiratory:  Positive for cough, chest tightness and shortness of breath.    Cardiovascular:  Negative for chest pain.   Gastrointestinal:  Negative for " nausea and vomiting.   Genitourinary:  Negative for dysuria.   Musculoskeletal:  Negative for back pain.   Skin:  Negative for rash.   Neurological:  Negative for weakness.       Physical Exam     Initial Vitals [02/28/24 1436]   BP Pulse Resp Temp SpO2   (!) 162/95 79 18 99 °F (37.2 °C) 96 %      MAP       --         Physical Exam    Nursing note and vitals reviewed.  Constitutional: He appears well-developed and well-nourished. He is not diaphoretic. No distress.   HENT:   Head: Normocephalic and atraumatic.   Eyes: Conjunctivae and EOM are normal. Pupils are equal, round, and reactive to light. No scleral icterus.   Neck: Neck supple.   Normal range of motion.  Cardiovascular:  Normal rate, regular rhythm, normal heart sounds and intact distal pulses.     Exam reveals no gallop and no friction rub.       No murmur heard.  Pulmonary/Chest: No stridor. No respiratory distress. He has wheezes. He has no rhonchi. He has no rales.   Abdominal: Abdomen is soft. Bowel sounds are normal. He exhibits no distension. There is no abdominal tenderness. There is no rebound and no guarding.   Musculoskeletal:         General: No tenderness or edema. Normal range of motion.      Cervical back: Normal range of motion and neck supple.     Neurological: He is alert and oriented to person, place, and time. He has normal strength. No cranial nerve deficit. GCS score is 15. GCS eye subscore is 4. GCS verbal subscore is 5. GCS motor subscore is 6.   Skin: Skin is warm and dry. No rash noted.   Psychiatric: He has a normal mood and affect. His behavior is normal.         ED Course   Procedures  Labs Reviewed   CBC W/ AUTO DIFFERENTIAL - Abnormal; Notable for the following components:       Result Value    Eos # 1.0 (*)     Eosinophil % 11.6 (*)     All other components within normal limits   COMPREHENSIVE METABOLIC PANEL - Abnormal; Notable for the following components:    Glucose 134 (*)     ALT 46 (*)     eGFR 59 (*)     All other  components within normal limits   TROPONIN I   TROPONIN I   B-TYPE NATRIURETIC PEPTIDE   D DIMER, QUANTITATIVE     EKG Readings: (Independently Interpreted)   Initial Reading: No STEMI. Rhythm: Normal Sinus Rhythm. Heart Rate: 77. Ectopy: No Ectopy.   No ST segment elevation or depression concerning for acute ischemia.        ECG Results              EKG 12-lead (Final result)        Collection Time Result Time QRS Duration OHS QTC Calculation    02/28/24 16:37:07 02/29/24 15:39:11 96 425                     Final result by Interface, Lab In [facility] (02/29/24 15:39:18)                   Narrative:    Test Reason : R07.9,    Vent. Rate : 077 BPM     Atrial Rate : 077 BPM     P-R Int : 190 ms          QRS Dur : 096 ms      QT Int : 376 ms       P-R-T Axes : 056 072 102 degrees     QTc Int : 425 ms    Normal sinus rhythm  Lateral infarct ,age undetermined  Abnormal ECG  When compared with ECG of 07-JUL-2023 12:18,  Significant changes have occurred  Confirmed by Brandon Ventura MD ([id number]) on 2/29/2024 3:39:09 PM    Referred By: [email or web address]   SELF           Confirmed By:Brandon Ventura MD                                     EKG 12-LEAD (Final result)  Result time 02/29/24 15:00:42      Final result by Unknown User (02/29/24 15:00:42)                                      Imaging Results              X-Ray Chest AP Portable (Final result)  Result time 02/28/24 19:11:08      Final result by Elidia Dent MD (02/28/24 19:11:08)                   Impression:      No new focal consolidation.  Chronic interstitial findings a pulmonary fibrosis.      Electronically signed by: Elidia Dent  Date:    02/28/2024  Time:    19:11               Narrative:    EXAMINATION:  AP PORTABLE CHEST    CLINICAL HISTORY:  Chest Pain;    TECHNIQUE:  AP portable chest radiograph was submitted.    COMPARISON:  None.    FINDINGS:  AP portable chest radiograph demonstrates a cardiac silhouette within normal limits.  There is no focal  consolidation, pneumothorax, or pleural effusion.  There are chronic interstitial lung markings are fibrosis.                                       Medications   albuterol-ipratropium 2.5 mg-0.5 mg/3 mL nebulizer solution 3 mL (3 mLs Nebulization Given 2/28/24 1638)   predniSONE tablet 60 mg (60 mg Oral Given 2/28/24 1859)   albuterol sulfate nebulizer solution 2.5 mg (2.5 mg Nebulization Given 2/28/24 1857)     Medical Decision Making  Amount and/or Complexity of Data Reviewed  Labs: ordered.  Radiology: ordered.    Risk  Prescription drug management.  Decision regarding hospitalization.                          Medical Decision Making:   Initial Assessment:   73 year old patient  presenting secondary to shortness of breath. Troponins, Cxr, ekg, and labs ordered which showed no acute findings.    [email or web address]    Also considered but less likely:     PE: normal rate, no sob/recent immovilization/surgery/travel/family history  Pneumonia: chest xray negative. No fever or leukoscytosis. No cough and lungs non consistent with pna  Tamponade: unlikely due to chest xray and ekg  STEMI: No STEMI on ekg  Dissection: equal pulses bilaterally and no ripping chest pain to the back  Esophageal rupture: no dysphagia or vomiting and chest xray negative for mediastinal air  Arrhythmia: no arrhythmia on ekg  CHF: no fluid overload on Cxr and physical exam  Pneumothorax: bilateral breath sounds and no signs of pneumothorax on chest xray     This patient is coming in short of breath with chest tightness.  He is walking pulse ox of 87%.  He apparently has a history of pulmonary fibrosis although he does not know much about this diagnosis.  He is wheezing with a prolonged expiratory phase.  He has been given prednisone as well as albuterol x2 but continues to be short of breath.  I would like to bring him in for further evaluation and treatment of his chronic lung issues with wheezing, presumed  COPD or other restrictive process with wheezing               Clinical Impression:  Final diagnoses:  [R07.9] Chest pain  [R09.02] Hypoxia (Primary)  [R06.02] SOB (shortness of breath)          ED Disposition Condition    Admit Stable                Edgardo Cooley MD  02/29/24 [id number]

## 2024-02-29 VITALS
RESPIRATION RATE: 17 BRPM | SYSTOLIC BLOOD PRESSURE: 137 MMHG | HEART RATE: 95 BPM | DIASTOLIC BLOOD PRESSURE: 85 MMHG | OXYGEN SATURATION: 95 % | TEMPERATURE: 99 F | BODY MASS INDEX: 22.72 KG/M2 | WEIGHT: 149.94 LBS | HEIGHT: 68 IN

## 2024-02-29 LAB
ANION GAP SERPL CALC-SCNC: 12 MMOL/L (ref 8–16)
BASOPHILS # BLD AUTO: 0.02 K/UL (ref 0–0.2)
BASOPHILS NFR BLD: 0.2 % (ref 0–1.9)
BUN SERPL-MCNC: 18 MG/DL (ref 8–23)
CALCIUM SERPL-MCNC: 9.2 MG/DL (ref 8.7–10.5)
CHLORIDE SERPL-SCNC: 102 MMOL/L (ref 95–110)
CO2 SERPL-SCNC: 23 MMOL/L (ref 23–29)
CREAT SERPL-MCNC: 1.3 MG/DL (ref 0.5–1.4)
DIFFERENTIAL METHOD BLD: ABNORMAL
EOSINOPHIL # BLD AUTO: 0 K/UL (ref 0–0.5)
EOSINOPHIL NFR BLD: 0.2 % (ref 0–8)
ERYTHROCYTE [DISTWIDTH] IN BLOOD BY AUTOMATED COUNT: 13.2 % (ref 11.5–14.5)
ERYTHROCYTE [SEDIMENTATION RATE] IN BLOOD BY WESTERGREN METHOD: 13 MM/HR (ref 0–10)
EST. GFR  (NO RACE VARIABLE): 59 ML/MIN/1.73 M^2
GLUCOSE SERPL-MCNC: 309 MG/DL (ref 70–110)
HCT VFR BLD AUTO: 43.4 % (ref 40–54)
HGB BLD-MCNC: 14.4 G/DL (ref 14–18)
IMM GRANULOCYTES # BLD AUTO: 0.06 K/UL (ref 0–0.04)
IMM GRANULOCYTES NFR BLD AUTO: 0.6 % (ref 0–0.5)
LYMPHOCYTES # BLD AUTO: 0.9 K/UL (ref 1–4.8)
LYMPHOCYTES NFR BLD: 8.8 % (ref 18–48)
MCH RBC QN AUTO: 27.9 PG (ref 27–31)
MCHC RBC AUTO-ENTMCNC: 33.2 G/DL (ref 32–36)
MCV RBC AUTO: 84 FL (ref 82–98)
MONOCYTES # BLD AUTO: 0.2 K/UL (ref 0.3–1)
MONOCYTES NFR BLD: 2 % (ref 4–15)
NEUTROPHILS # BLD AUTO: 9.1 K/UL (ref 1.8–7.7)
NEUTROPHILS NFR BLD: 88.2 % (ref 38–73)
NRBC BLD-RTO: 0 /100 WBC
OHS QRS DURATION: 96 MS
OHS QTC CALCULATION: 425 MS
PLATELET # BLD AUTO: 180 K/UL (ref 150–450)
PMV BLD AUTO: 10.4 FL (ref 9.2–12.9)
POCT GLUCOSE: 152 MG/DL (ref 70–110)
POCT GLUCOSE: 278 MG/DL (ref 70–110)
POCT GLUCOSE: 316 MG/DL (ref 70–110)
POTASSIUM SERPL-SCNC: 4.4 MMOL/L (ref 3.5–5.1)
RBC # BLD AUTO: 5.16 M/UL (ref 4.6–6.2)
SODIUM SERPL-SCNC: 137 MMOL/L (ref 136–145)
WBC # BLD AUTO: 10.33 K/UL (ref 3.9–12.7)

## 2024-02-29 PROCEDURE — 99223 1ST HOSP IP/OBS HIGH 75: CPT | Mod: ,,, | Performed by: STUDENT IN AN ORGANIZED HEALTH CARE EDUCATION/TRAINING PROGRAM

## 2024-02-29 PROCEDURE — 86431 RHEUMATOID FACTOR QUANT: CPT | Performed by: STUDENT IN AN ORGANIZED HEALTH CARE EDUCATION/TRAINING PROGRAM

## 2024-02-29 PROCEDURE — 25000242 PHARM REV CODE 250 ALT 637 W/ HCPCS: Performed by: STUDENT IN AN ORGANIZED HEALTH CARE EDUCATION/TRAINING PROGRAM

## 2024-02-29 PROCEDURE — 86200 CCP ANTIBODY: CPT | Performed by: STUDENT IN AN ORGANIZED HEALTH CARE EDUCATION/TRAINING PROGRAM

## 2024-02-29 PROCEDURE — 36415 COLL VENOUS BLD VENIPUNCTURE: CPT | Performed by: INTERNAL MEDICINE

## 2024-02-29 PROCEDURE — 25000242 PHARM REV CODE 250 ALT 637 W/ HCPCS: Performed by: INTERNAL MEDICINE

## 2024-02-29 PROCEDURE — 25000003 PHARM REV CODE 250: Performed by: INTERNAL MEDICINE

## 2024-02-29 PROCEDURE — 63600175 PHARM REV CODE 636 W HCPCS: Performed by: INTERNAL MEDICINE

## 2024-02-29 PROCEDURE — 36415 COLL VENOUS BLD VENIPUNCTURE: CPT | Mod: XB | Performed by: STUDENT IN AN ORGANIZED HEALTH CARE EDUCATION/TRAINING PROGRAM

## 2024-02-29 PROCEDURE — 63600175 PHARM REV CODE 636 W HCPCS: Performed by: STUDENT IN AN ORGANIZED HEALTH CARE EDUCATION/TRAINING PROGRAM

## 2024-02-29 PROCEDURE — 94640 AIRWAY INHALATION TREATMENT: CPT

## 2024-02-29 PROCEDURE — 94761 N-INVAS EAR/PLS OXIMETRY MLT: CPT

## 2024-02-29 PROCEDURE — 85025 COMPLETE CBC W/AUTO DIFF WBC: CPT | Performed by: INTERNAL MEDICINE

## 2024-02-29 PROCEDURE — 86038 ANTINUCLEAR ANTIBODIES: CPT | Performed by: STUDENT IN AN ORGANIZED HEALTH CARE EDUCATION/TRAINING PROGRAM

## 2024-02-29 PROCEDURE — 80048 BASIC METABOLIC PNL TOTAL CA: CPT | Performed by: INTERNAL MEDICINE

## 2024-02-29 PROCEDURE — 85652 RBC SED RATE AUTOMATED: CPT | Performed by: INTERNAL MEDICINE

## 2024-02-29 RX ORDER — ACETAMINOPHEN 325 MG/1
650 TABLET ORAL EVERY 6 HOURS PRN
Status: DISCONTINUED | OUTPATIENT
Start: 2024-02-29 | End: 2024-02-29 | Stop reason: HOSPADM

## 2024-02-29 RX ORDER — PREDNISONE 20 MG/1
40 TABLET ORAL DAILY
Status: DISCONTINUED | OUTPATIENT
Start: 2024-02-29 | End: 2024-02-29 | Stop reason: HOSPADM

## 2024-02-29 RX ORDER — ALBUTEROL SULFATE 2.5 MG/.5ML
2.5 SOLUTION RESPIRATORY (INHALATION) EVERY 6 HOURS
Status: DISCONTINUED | OUTPATIENT
Start: 2024-02-29 | End: 2024-02-29 | Stop reason: HOSPADM

## 2024-02-29 RX ORDER — ESOMEPRAZOLE MAGNESIUM 40 MG/1
40 CAPSULE, DELAYED RELEASE ORAL 2 TIMES DAILY
Qty: 60 CAPSULE | Refills: 11 | Status: SHIPPED | OUTPATIENT
Start: 2024-02-29 | End: 2024-04-20

## 2024-02-29 RX ORDER — ALUMINUM HYDROXIDE, MAGNESIUM HYDROXIDE, AND SIMETHICONE 1200; 120; 1200 MG/30ML; MG/30ML; MG/30ML
30 SUSPENSION ORAL 4 TIMES DAILY PRN
Status: DISCONTINUED | OUTPATIENT
Start: 2024-02-29 | End: 2024-02-29 | Stop reason: HOSPADM

## 2024-02-29 RX ORDER — METHYLPREDNISOLONE 4 MG/1
TABLET ORAL
Qty: 21 EACH | Refills: 0 | Status: SHIPPED | OUTPATIENT
Start: 2024-02-29 | End: 2024-03-21

## 2024-02-29 RX ADMIN — INSULIN ASPART 4 UNITS: 100 INJECTION, SOLUTION INTRAVENOUS; SUBCUTANEOUS at 08:02

## 2024-02-29 RX ADMIN — ASPIRIN 81 MG CHEWABLE TABLET 81 MG: 81 TABLET CHEWABLE at 08:02

## 2024-02-29 RX ADMIN — AMLODIPINE BESYLATE 10 MG: 5 TABLET ORAL at 08:02

## 2024-02-29 RX ADMIN — CETIRIZINE HYDROCHLORIDE 5 MG: 5 TABLET ORAL at 08:02

## 2024-02-29 RX ADMIN — FAMOTIDINE 20 MG: 20 TABLET ORAL at 08:02

## 2024-02-29 RX ADMIN — INSULIN ASPART 3 UNITS: 100 INJECTION, SOLUTION INTRAVENOUS; SUBCUTANEOUS at 05:02

## 2024-02-29 RX ADMIN — ALBUTEROL SULFATE 2.5 MG: 2.5 SOLUTION RESPIRATORY (INHALATION) at 01:02

## 2024-02-29 RX ADMIN — PRAVASTATIN SODIUM 40 MG: 40 TABLET ORAL at 08:02

## 2024-02-29 RX ADMIN — CARVEDILOL 25 MG: 12.5 TABLET, FILM COATED ORAL at 08:02

## 2024-02-29 RX ADMIN — PREDNISONE 40 MG: 20 TABLET ORAL at 01:02

## 2024-02-29 RX ADMIN — TAMSULOSIN HYDROCHLORIDE 0.4 MG: 0.4 CAPSULE ORAL at 08:02

## 2024-02-29 NOTE — ASSESSMENT & PLAN NOTE
"Patient's FSGs are uncontrolled due to hyperglycemia on current medication regimen.  Last A1c reviewed-   Lab Results   Component Value Date    HGBA1C 7.0 (H) 02/18/2024     Most recent fingerstick glucose reviewed- No results for input(s): "POCTGLUCOSE" in the last 24 hours.  Current correctional scale  Low  Maintain anti-hyperglycemic dose as follows-   Antihyperglycemics (From admission, onward)      Start     Stop Route Frequency Ordered    02/28/24 2037  insulin aspart U-100 pen 0-5 Units         -- SubQ Before meals & nightly PRN 02/28/24 1938          Hold Oral hypoglycemics while patient is in the hospital.    -metformin and glipizide currently on hold.  "

## 2024-02-29 NOTE — CONSULTS
"[facility] [place] - Tele[place]  Pulmonology  Consult Note    Patient Name: Emmanuel Grant  MRN: [id number]  Admission Date: 2/28/2024  Hospital Length of Stay: 1 days  Code Status: Full Code  Attending Physician: Marc Li III, MD  Primary Care Provider: Payam Vu Jr., NP   Principal Problem: Acute hypoxic respiratory failure    Inpatient consult to Pulmonology  Consult performed by: Jayson Morales MD  Consult ordered by: Gurvinder Sears MD        Subjective:     HPI:  73-year-old male with history of GERD, CAD s/p PCI, dm 2 presenting with progressive cough and shortness of breath limiting his speech.  Patient states that he was discharged from [facility] after presenting with similar symptoms and was told it was possibly due to aspiration or reflux.  EGD at that time showed gastritis.  Per Pulmonary on chart review, cough present for 5 years.  Patient states that she left there sick so he came [facility]    Past Medical History:   Diagnosis Date    Diabetes mellitus     Hypertension     Kidney stone     Stroke        Past Surgical History:   Procedure Laterality Date    SHOULDER SURGERY Right        Review of patient's allergies indicates:   Allergen Reactions    Dapagliflozin      Other reaction(s): Other (See Comments)    Pcn [penicillins]     Linagliptin Other (See Comments)     "it knocked me down", "it almost killed me"    Lisinopril Other (See Comments)     cough    Pantoprazole Hives       Family History       Problem Relation (Age of Onset)    Colon cancer Sister          Tobacco Use    Smoking status: Never     Passive exposure: Never    Smokeless tobacco: Never   Substance and Sexual Activity    Alcohol use: No    Drug use: Never    Sexual activity: Not Currently         Objective:     Vital Signs (Most Recent):  Temp: 98.8 °F (37.1 °C) (02/29/24 1115)  Pulse: 95 (02/29/24 1307)  Resp: 17 (02/29/24 1307)  BP: 137/85 (02/29/24 1115)  SpO2: 95 % (02/29/24 1307) Vital Signs (24h Range):  Temp:  [97.7 °F " (36.5 °C)-98.8 °F (37.1 °C)] 98.8 °F (37.1 °C)  Pulse:  [65-99] 95  Resp:  [14-22] 17  SpO2:  [89 %-99 %] 95 %  BP: (137-187)/() 137/85     Weight: 68 kg (149 lb 14.6 oz)  Body mass index is 22.79 kg/m².      Intake/Output Summary (Last 24 hours) at 2/29/2024 1446  Last data filed at 2/29/2024 1132  Gross per 24 hour   Intake 360 ml   Output --   Net 360 ml        Physical Exam  Vitals and nursing note reviewed.   Constitutional:       General: He is not in acute distress.     Appearance: He is not ill-appearing, toxic-appearing or diaphoretic.   HENT:      Head: Atraumatic.      Mouth/Throat:      Mouth: Mucous membranes are moist.   Eyes:      General: No scleral icterus.     Extraocular Movements: Extraocular movements intact.   Cardiovascular:      Rate and Rhythm: Normal rate.   Pulmonary:      Effort: No tachypnea, accessory muscle usage, respiratory distress or retractions.      Comments: Intermittent nonproductive cough  Abdominal:      General: There is no distension.   Skin:     General: Skin is dry.      Coloration: Skin is not jaundiced.      Findings: No rash.   Neurological:      General: No focal deficit present.      Mental Status: He is alert. Mental status is at baseline.          Vents:       Lines/Drains/Airways       Peripheral Intravenous Line  Duration                  Peripheral IV - Single Lumen 02/28/24 1600 20 G Posterior;Right Forearm <1 day                    Significant Labs:    CBC/Anemia Profile:  Recent Labs   Lab 02/28/24  1625 02/29/24  0437   WBC 8.68 10.33   HGB 14.2 14.4   HCT 41.0 43.4    180   MCV 83 84   RDW 13.3 13.2        Chemistries:  Recent Labs   Lab 02/28/24  1625 02/29/24  0437    137   K 4.7 4.4    102   CO2 25 23   BUN 16 18   CREATININE 1.3 1.3   CALCIUM 9.1 9.2   ALBUMIN 3.5  --    PROT 7.2  --    BILITOT 0.6  --    ALKPHOS 61  --    ALT 46*  --    AST 25  --        All pertinent labs within the past 24 hours have been  "reviewed.    Significant Imaging:   I have reviewed all pertinent imaging results/findings within the past 24 hours.    ABG  No results for input(s): "PH", "PO2", "PCO2", "HCO3", "BE" in the last 168 hours.  Assessment/Plan:     Pulmonary  Interstitial lung disease  02/2019 CTAP mentioning bibasilar atelectasis, however can not see images.  07/2023 CTA with interlobular septal thickening, reticulations and some degree of fibrosis.  02/2024 CT with honeycombing, reticulation.  No baseline PFTs.  Appears progressive process based on previous imaging, however uncontrolled GERD symptoms.  Unclear etiology at this point, so order CORAZON, RF, CCP as part of ILD workup.  Recommend starting PPI given history of GERD.  MBSS with aspiration.  Needs outpatient PFTs with pulmonary follow-up.  Low quality evidence to support empiric steroids and these patients with suspected fibrotic ILD flare, so defer to hospitalist    Other  * Acute hypoxic respiratory failure  Has fibrotic appearing ILD.  Combined systolic and diastolic heart failure.  Optimize volume status.  ILD workup and management as mentioned in that section            Jayson Morales MD  Pulmonology  [facility]    "

## 2024-02-29 NOTE — NURSING
Received pt from ED to room via bed. Transferred pt to bed. Evaluated patient condition. Admit assessment initiated. Telemetry monitoring initiated. Saline lock to LAC flushed, intact. No c/o pain. NAD noted.

## 2024-02-29 NOTE — H&P
"  [facility]  History & Physical    Patient Name: Emmanuel Grant  MRN: [id number]  Patient Class: IP- Inpatient  Admission Date: 2/28/2024  Attending Physician: Marc Li III, MD   Primary Care Provider: Payam Vu Jr., NP         Patient information was obtained from patient and ER records. Outpatient medical records.    Subjective:     Principal Problem:<principal problem not specified>    Chief Complaint:   Chief Complaint   Patient presents with    Fatigue     Presents to the ED via EMS with c/o fatigue and SOB x 3 days. Reports has had multiple tests ran but no answers. Reports night sweats, LE swelling and SOB when laying flat.         HPI: Mr Grant is a 73-year-old male who presents to the emergency room with progressive cough, mild dyspnea to a point where he is not able to speak full sentences.  He reports previous history of type 2 diabetes mellitus, coronary disease with previous PCI, BPH.    He was discharged from [facility] after being admitted there for similar reason, underwent CAT scan that showed interstitial lung disease that was consistent with pulmonary fibrosis and questionably chronic aspiration/reflux.  Patient was evaluated by both pulmonology as well as GI, EGD was performed that showed gastritis without significant esophagitis.  GI recommended continuing PPI and a short course of steroid with outpatient follow-up with pulmonology.  As per pulmonary note he has had cough for over 5 years that has been progressive, ILD panel including autoimmune workup was sent prior to his discharge which was pending.    He reports that after he got discharged, he got "sick again", and has not been able to follow-up with outpatient pulmonary since discharge due to short timeframe and came to our hospital for further evaluation.    In the ER, he was noted to have O2 saturation of 89% requiring 2 L nasal cannula supplementation, his CBC shows normal white count, " "BMP is grossly normal limits with creatinine of 1.3, which is close to his baseline of 1.1-1.2 and mild hyperglycemia 134.  His chest x-ray again demonstrates chronic interstitial pulmonary fibrosis.  Given requirement of oxygen, admission has been requested for further evaluation and treatment.    Past Medical History:   Diagnosis Date    Diabetes mellitus     Hypertension     Kidney stone     Stroke        Past Surgical History:   Procedure Laterality Date    SHOULDER SURGERY Right        Review of patient's allergies indicates:   Allergen Reactions    Dapagliflozin      Other reaction(s): Other (See Comments)    Pcn [penicillins]     Linagliptin Other (See Comments)     "it knocked me down", "it almost killed me"    Lisinopril Other (See Comments)     cough    Pantoprazole Hives       No current facility-administered medications on file prior to encounter.     Current Outpatient Medications on File Prior to Encounter   Medication Sig    acetaminophen (TYLENOL) 500 MG tablet Take 1 tablet (500 mg total) by mouth every 4 (four) hours as needed for Pain or Temperature greater than (100.5 or greater).    albuterol (PROVENTIL/VENTOLIN HFA) 90 mcg/actuation inhaler Inhale 2 puffs into the lungs every 6 (six) hours as needed for Wheezing or Shortness of Breath.    amlodipine (NORVASC) 10 MG tablet Take 10 mg by mouth once daily.    aspirin 81 MG Chew Take 81 mg by mouth once daily.    carvedilol (COREG) 25 MG tablet Take 25 mg by mouth 2 (two) times daily with meals.    famotidine (PEPCID) 20 MG tablet Take 1 tablet (20 mg total) by mouth 2 (two) times daily.    fluticasone propionate (FLONASE) 50 mcg/actuation nasal spray 1 spray (50 mcg total) by Each Nostril route once daily.    glipiZIDE (GLUCOTROL) 10 MG tablet Take 10 mg by mouth 2 (two) times daily before meals.    loratadine (CLARITIN) 10 mg tablet Take 1 tablet (10 mg total) by mouth every morning.    metFORMIN (GLUCOPHAGE) 1000 MG tablet Take 0.5 tablets (500 " mg total) by mouth daily with breakfast.    montelukast (SINGULAIR) 5 MG chewable tablet Take 1 tablet (5 mg total) by mouth every evening.    polyethylene glycol (GLYCOLAX) 17 gram/dose powder Take 17 g by mouth once daily. Take daily to prevent constipation    pravastatin (PRAVACHOL) 40 MG tablet Take 40 mg by mouth once daily.    sucralfate (CARAFATE) 1 gram tablet Take 1 tablet (1 g total) by mouth 4 (four) times daily before meals and nightly.    tamsulosin (FLOMAX) 0.4 mg Cap Take 1 capsule (0.4 mg total) by mouth once daily.     Family History       Problem Relation (Age of Onset)    Colon cancer Sister          Tobacco Use    Smoking status: Never     Passive exposure: Never    Smokeless tobacco: Never   Substance and Sexual Activity    Alcohol use: No    Drug use: Never    Sexual activity: Not Currently     Review of Systems  12 point systems were reviewed and negative except as mentioned in HPI section.  Objective:     Vital Signs (Most Recent):  Temp: 97.7 °F (36.5 °C) (02/28/24 2039)  Pulse: 72 (02/28/24 2039)  Resp: 18 (02/28/24 2039)  BP: (!) 187/72 (02/28/24 2039)  SpO2: (!) 94 % (02/28/24 2039) Vital Signs (24h Range):  Temp:  [97.7 °F (36.5 °C)-99 °F (37.2 °C)] 97.7 °F (36.5 °C)  Pulse:  [68-79] 72  Resp:  [14-22] 18  SpO2:  [89 %-97 %] 94 %  BP: (162-187)/() 187/72     Weight: 65.8 kg (145 lb)  Body mass index is 22.05 kg/m².     Physical Exam     General: Alert ,no apparent cardiorespiratory distress, laying comfortably  Eye: PERRL, normal conjunctiva  HEENT: Normocephalic, atraumatic, dry  oral mucosa  Neck: Supple  Respiratory:  Mild midlung zone bilateral coarse sounds, equal breath sounds, symmetric expansion, no chest wall tenderness  Cardiovascular: Normal rate, regular rhythm, no appreciable murmurs rubs or gallops, no peripheral edema, good pulses and equal in all extremities.  Gastrointestinal: Soft, non-tender, non-distended, normal bowel sounds  Genitourinary:  Deferred  Musculoskeletal and Neurologic: Motor function is normal with muscle strength 5/5 bilaterally to upper and lower extremities. Sensation is intact bilaterally.   Psychiatric: Appropriate mood and affect.         Significant Labs: All pertinent labs within the past 24 hours have been reviewed.  BMP:   Recent Labs   Lab 02/28/24  1625   *      K 4.7      CO2 25   BUN 16   CREATININE 1.3   CALCIUM 9.1     CBC:   Recent Labs   Lab 02/28/24  1625   WBC 8.68   HGB 14.2   HCT 41.0          Significant Imaging: I have reviewed all pertinent imaging results/findings within the past 24 hours.  Assessment/Plan:     * Acute hypoxic respiratory failure  Patient with Hypoxic Respiratory failure which is Acute.  he is not on home oxygen. Supplemental oxygen was provided and noted-      .   Signs/symptoms of respiratory failure include- increased work of breathing. Contributing diagnoses includes - Interstitial lung disease Labs and images were reviewed. Patient Has not had a recent ABG. Will treat underlying causes and adjust management of respiratory failure as follows-     -Continue oxygen supplementation to keep saturation over 94%.  -Request pulmonology consultation.  -Appears that he had pulmonary and GI consultations during recent hospitalization and autoimmune panel etc. were sent which are pending.  -Would recommend close outpatient follow-up with pulmonology upon discharge.  -May need 6-minute walk test for evaluation of outpatient oxygen requirement.    Interstitial lung disease  -Reportedly has had progressively worsening cough since over several years.  -  Undetermined etiology at this time.      Type 2 diabetes mellitus with hyperglycemia  Patient's FSGs are uncontrolled due to hyperglycemia on current medication regimen.  Last A1c reviewed-   Lab Results   Component Value Date    HGBA1C 7.0 (H) 02/18/2024     Most recent fingerstick glucose reviewed- No results for input(s):  ""POCTGLUCOSE" in the last 24 hours.  Current correctional scale  Low  Maintain anti-hyperglycemic dose as follows-   Antihyperglycemics (From admission, onward)      Start     Stop Route Frequency Ordered    02/28/24 2037  insulin aspart U-100 pen 0-5 Units         -- SubQ Before meals & nightly PRN 02/28/24 1938          Hold Oral hypoglycemics while patient is in the hospital.    -metformin and glipizide currently on hold.    BPH (benign prostatic hyperplasia)  -Continue Flomax      Coronary artery disease  -Reports previous PCI.  -Continue aspirin, Coreg, statin.      VTE Risk Mitigation (From admission, onward)           Ordered     enoxaparin injection 40 mg  Daily         02/28/24 1938     IP VTE HIGH RISK PATIENT  Once         02/28/24 1937     Place sequential compression device  Until discontinued         02/28/24 1937                               AdmissionCare    Guideline: COPD - INPT, Inpatient    Based on the indications selected for the patient, the bed status of Inpatient was determined to be MET    The following indications were selected as present at the time of evaluation of the patient:      - Hemodynamic instability, as indicated by 1 or more of the following:    - Vital sign abnormality not readily corrected by appropriate treatment, as indicated by 1 or more of the following:     - Tachycardia that persists despite appropriate treatment (eg, volume repletion, treatment of pain, treatment of underlying cause)    AdmissionCare documentation entered by: Lynn Storey    [facility] [employer], 27th edition, Copyright © 2023 [facility], [employer] All Rights Reserved.  [id number]:53:56-06:00    Gurvinder Sears MD  Department of Hospital Medicine  [facility] [place] - Telemetry          "

## 2024-02-29 NOTE — NURSING
Home Oxygen Test Completed     Pt O2 sats on room air while at rest: 93%    Pt O2 sats on room air during activity: 91%     Pt tolerated exam without incident, denies SOB.

## 2024-02-29 NOTE — PLAN OF CARE
Case Management Final Discharge Note      Discharge Disposition: home     New DME ordered / company name: none    Relevant SDOH / Transition of Care Barriers:  none    Primary Caretaker and contact information: Emmanuel Grant Jr (Son)   [phone] (Mobile)     Scheduled followup appointment: PCP    Referrals placed: Pulmonology    Transportation: Family    Patient and family educated on discharge services and updated on DC plan. Bedside RN notified, patient clear to discharge from Case Management Perspective.      02/29/24 1300   Final Note   Assessment Type Final Discharge Note   Anticipated Discharge Disposition Home   What phone number can be called within the next 1-3 days to see how you are doing after discharge? [phone]   Hospital Resources/Appts/Education Provided Appointments scheduled and added to AVS   Post-Acute Status   Coverage Medicare   Discharge Delays None known at this time

## 2024-02-29 NOTE — PLAN OF CARE
Problem: Adult Inpatient Plan of Care  Goal: Absence of Hospital-Acquired Illness or Injury  Intervention: Identify and Manage Fall Risk  Flowsheets (Taken 2/29/2024 0513)  Safety Promotion/Fall Prevention:   assistive device/personal item within reach   side rails raised x 2   room near unit station   instructed to call staff for mobility  Goal: Optimal Comfort and Wellbeing  Intervention: Monitor Pain and Promote Comfort  Flowsheets (Taken 2/29/2024 0513)  Pain Management Interventions:   care clustered   pain management plan reviewed with patient/caregiver   quiet environment facilitated   medication offered

## 2024-02-29 NOTE — NURSING
[facility], [facility]  Nurses Note -- 4 Eyes      2/28/2024       Skin assessed on: Admit      [x] No Pressure Injuries Present    []Prevention Measures Documented    [] Yes LDA  for Pressure Injury Previously documented     [] Yes New Pressure Injury Discovered   [] LDA for New Pressure Injury Added      Attending RN:  Israel Pyle RN     Second RN:  KANDI Sam

## 2024-02-29 NOTE — ADMISSIONCARE
AdmissionCare    Guideline: COPD - INPT, Inpatient    Based on the indications selected for the patient, the bed status of Inpatient was determined to be MET    The following indications were selected as present at the time of evaluation of the patient:      - Hemodynamic instability, as indicated by 1 or more of the following:    - Vital sign abnormality not readily corrected by appropriate treatment, as indicated by 1 or more of the following:     - Tachycardia that persists despite appropriate treatment (eg, volume repletion, treatment of pain, treatment of underlying cause)    AdmissionCare documentation entered by: Lynn Storey    [facility], 27th edition, Copyright © 2023 [facility], [facility] All Rights Reserved.  [id number]:53:56-06:00

## 2024-02-29 NOTE — SUBJECTIVE & OBJECTIVE
"Past Medical History:   Diagnosis Date    Diabetes mellitus     Hypertension     Kidney stone     Stroke        Past Surgical History:   Procedure Laterality Date    SHOULDER SURGERY Right        Review of patient's allergies indicates:   Allergen Reactions    Dapagliflozin      Other reaction(s): Other (See Comments)    Pcn [penicillins]     Linagliptin Other (See Comments)     "it knocked me down", "it almost killed me"    Lisinopril Other (See Comments)     cough    Pantoprazole Hives       No current facility-administered medications on file prior to encounter.     Current Outpatient Medications on File Prior to Encounter   Medication Sig    acetaminophen (TYLENOL) 500 MG tablet Take 1 tablet (500 mg total) by mouth every 4 (four) hours as needed for Pain or Temperature greater than (100.5 or greater).    albuterol (PROVENTIL/VENTOLIN HFA) 90 mcg/actuation inhaler Inhale 2 puffs into the lungs every 6 (six) hours as needed for Wheezing or Shortness of Breath.    amlodipine (NORVASC) 10 MG tablet Take 10 mg by mouth once daily.    aspirin 81 MG Chew Take 81 mg by mouth once daily.    carvedilol (COREG) 25 MG tablet Take 25 mg by mouth 2 (two) times daily with meals.    famotidine (PEPCID) 20 MG tablet Take 1 tablet (20 mg total) by mouth 2 (two) times daily.    fluticasone propionate (FLONASE) 50 mcg/actuation nasal spray 1 spray (50 mcg total) by Each Nostril route once daily.    glipiZIDE (GLUCOTROL) 10 MG tablet Take 10 mg by mouth 2 (two) times daily before meals.    loratadine (CLARITIN) 10 mg tablet Take 1 tablet (10 mg total) by mouth every morning.    metFORMIN (GLUCOPHAGE) 1000 MG tablet Take 0.5 tablets (500 mg total) by mouth daily with breakfast.    montelukast (SINGULAIR) 5 MG chewable tablet Take 1 tablet (5 mg total) by mouth every evening.    polyethylene glycol (GLYCOLAX) 17 gram/dose powder Take 17 g by mouth once daily. Take daily to prevent constipation    pravastatin (PRAVACHOL) 40 MG tablet " Take 40 mg by mouth once daily.    sucralfate (CARAFATE) 1 gram tablet Take 1 tablet (1 g total) by mouth 4 (four) times daily before meals and nightly.    tamsulosin (FLOMAX) 0.4 mg Cap Take 1 capsule (0.4 mg total) by mouth once daily.     Family History       Problem Relation (Age of Onset)    Colon cancer Sister          Tobacco Use    Smoking status: Never     Passive exposure: Never    Smokeless tobacco: Never   Substance and Sexual Activity    Alcohol use: No    Drug use: Never    Sexual activity: Not Currently     Review of Systems  12 point systems were reviewed and negative except as mentioned in HPI section.  Objective:     Vital Signs (Most Recent):  Temp: 97.7 °F (36.5 °C) (02/28/24 2039)  Pulse: 72 (02/28/24 2039)  Resp: 18 (02/28/24 2039)  BP: (!) 187/72 (02/28/24 2039)  SpO2: (!) 94 % (02/28/24 2039) Vital Signs (24h Range):  Temp:  [97.7 °F (36.5 °C)-99 °F (37.2 °C)] 97.7 °F (36.5 °C)  Pulse:  [68-79] 72  Resp:  [14-22] 18  SpO2:  [89 %-97 %] 94 %  BP: (162-187)/() 187/72     Weight: 65.8 kg (145 lb)  Body mass index is 22.05 kg/m².     Physical Exam     General: Alert ,no apparent cardiorespiratory distress, laying comfortably  Eye: PERRL, normal conjunctiva  HEENT: Normocephalic, atraumatic, dry  oral mucosa  Neck: Supple  Respiratory:  Mild midlung zone bilateral coarse sounds, equal breath sounds, symmetric expansion, no chest wall tenderness  Cardiovascular: Normal rate, regular rhythm, no appreciable murmurs rubs or gallops, no peripheral edema, good pulses and equal in all extremities.  Gastrointestinal: Soft, non-tender, non-distended, normal bowel sounds  Genitourinary: Deferred  Musculoskeletal and Neurologic: Motor function is normal with muscle strength 5/5 bilaterally to upper and lower extremities. Sensation is intact bilaterally.   Psychiatric: Appropriate mood and affect.         Significant Labs: All pertinent labs within the past 24 hours have been reviewed.  BMP:   Recent  Labs   Lab 02/28/24  1625   *      K 4.7      CO2 25   BUN 16   CREATININE 1.3   CALCIUM 9.1     CBC:   Recent Labs   Lab 02/28/24  1625   WBC 8.68   HGB 14.2   HCT 41.0          Significant Imaging: I have reviewed all pertinent imaging results/findings within the past 24 hours.

## 2024-02-29 NOTE — NURSING
[facility] [place]  Nurses Note -- 4 Eyes      2/29/2024       Skin assessed on: Q Shift      [x] No Pressure Injuries Present    []Prevention Measures Documented    [] Yes LDA  for Pressure Injury Previously documented     [] Yes New Pressure Injury Discovered   [] LDA for New Pressure Injury Added      Attending RN:  Serge Brizuela, RN     Second RN:  Israel

## 2024-02-29 NOTE — ASSESSMENT & PLAN NOTE
02/2019 CTAP mentioning bibasilar atelectasis, however can not see images.  07/2023 CTA with interlobular septal thickening, reticulations and some degree of fibrosis.  02/2024 CT with honeycombing, reticulation.  No baseline PFTs.  Appears progressive process based on previous imaging, however uncontrolled GERD symptoms.  Unclear etiology at this point, so order CORAZON, RF, CCP as part of ILD workup.  Recommend starting PPI given history of GERD.  MBSS with aspiration.  Needs outpatient PFTs with pulmonary follow-up.  Low quality evidence to support empiric steroids and these patients with suspected fibrotic ILD flare, so defer to hospitalist

## 2024-02-29 NOTE — ASSESSMENT & PLAN NOTE
Patient with Hypoxic Respiratory failure which is Acute.  he is not on home oxygen. Supplemental oxygen was provided and noted-      .   Signs/symptoms of respiratory failure include- increased work of breathing. Contributing diagnoses includes - Interstitial lung disease Labs and images were reviewed. Patient Has not had a recent ABG. Will treat underlying causes and adjust management of respiratory failure as follows-     -Continue oxygen supplementation to keep saturation over 94%.  -Request pulmonology consultation.  -Appears that he had pulmonary and GI consultations during recent hospitalization and autoimmune panel etc. were sent which are pending.  -Would recommend close outpatient follow-up with pulmonology upon discharge.  -May need 6-minute walk test for evaluation of outpatient oxygen requirement.

## 2024-02-29 NOTE — HPI
73-year-old male with history of GERD, CAD s/p PCI, dm 2 presenting with progressive cough and shortness of breath limiting his speech.  Patient states that he was discharged from [facility] after presenting with similar symptoms and was told it was possibly due to aspiration or reflux.  EGD at that time showed gastritis.  Per Pulmonary on chart review, cough present for 5 years.  Patient states that she left there sick so he came [facility]

## 2024-02-29 NOTE — ASSESSMENT & PLAN NOTE
-Reportedly has had progressively worsening cough since over several years.  -  Undetermined etiology at this time.

## 2024-02-29 NOTE — ASSESSMENT & PLAN NOTE
Has fibrotic appearing ILD.  Combined systolic and diastolic heart failure.  Optimize volume status.  ILD workup and management as mentioned in that section

## 2024-02-29 NOTE — HPI
"Mr Grant is a 73-year-old male who presents to the emergency room with progressive cough, mild dyspnea to a point where he is not able to speak full sentences.  He reports previous history of type 2 diabetes mellitus, coronary disease with previous PCI, BPH.    He was discharged from [facility] after being admitted there for similar reason, underwent CAT scan that showed interstitial lung disease that was consistent with pulmonary fibrosis and questionably chronic aspiration/reflux.  Patient was evaluated by both pulmonology as well as GI, EGD was performed that showed gastritis without significant esophagitis.  GI recommended continuing PPI and a short course of steroid with outpatient follow-up with pulmonology.  As per pulmonary note he has had cough for over 5 years that has been progressive, ILD panel including autoimmune workup was sent prior to his discharge which was pending.    He reports that after he got discharged, he got "sick again", and has not been able to follow-up with outpatient pulmonary since discharge due to short timeframe and came to our hospital for further evaluation.    In the ER, he was noted to have O2 saturation of 89% requiring 2 L nasal cannula supplementation, his CBC shows normal white count, BMP is grossly normal limits with creatinine of 1.3, which is close to his baseline of 1.1-1.2 and mild hyperglycemia 134.  His chest x-ray again demonstrates chronic interstitial pulmonary fibrosis.  Given requirement of oxygen, admission has been requested for further evaluation and treatment.  "

## 2024-02-29 NOTE — NURSING
AVS and dc instructions provided to and reviewed with pt. Pt verbalizes understanding of all instructions. Pt declined to wait for virtual RN for Dc teaching. Bedside RN provided education. Pt states his ride was on their way and did not want to wait longer in his room for ride to arrive. Pt brought down via wc by Lorrie CHAU. GRAY or reported.

## 2024-02-29 NOTE — PLAN OF CARE
Case Management Assessment     PCP: Payam Vu  Pharmacy: [facility]    Patient Arrived From: home  Existing Help at Home: none    Barriers to Discharge: none    Discharge Plan:    A. Home    B. Home     SW completed initaila assessment and discussed discharge planning with patient at his bedside. Patient stated that he lives alone his brother is his support system. Patient's brother will provide transportation for him to get home when discharge from the hospital.      02/29/24 1158   Discharge Assessment   Assessment Type Discharge Planning Assessment   Confirmed/corrected address, phone number and insurance Yes   Confirmed Demographics Correct on Facesheet   Source of Information patient   Communicated DERIC with patient/caregiver Date not available/Unable to determine   Reason For Admission Acute hypoxic respiratory failure   People in Home alone   Do you expect to return to your current living situation? Yes   Do you have help at home or someone to help you manage your care at home? No   Prior to hospitilization cognitive status: Alert/Oriented   Current cognitive status: Alert/Oriented   Walking or Climbing Stairs Difficulty no   Dressing/Bathing Difficulty no   Equipment Currently Used at Home none   Readmission within 30 days? No   Patient currently being followed by outpatient case management? No   Do you currently have service(s) that help you manage your care at home? No   Do you take prescription medications? Yes   Do you have prescription coverage? Yes   Coverage Medicare   Do you have any problems affording any of your prescribed medications? No   Is the patient taking medications as prescribed? yes   Who is going to help you get home at discharge? NicoleEmmanuel yoon Jr (Son) [phone] (Mobile)   How do you get to doctors appointments? car, drives self   Are you on dialysis? No   Do you take coumadin? No   Discharge Plan A Home   Discharge Plan B Home   DME Needed Upon Discharge  none   Discharge  Plan discussed with: Patient   Transition of Care Barriers None

## 2024-03-01 LAB
ANA SER QL IF: NORMAL
CCP AB SER IA-ACNC: <0.5 U/ML
RHEUMATOID FACT SERPL-ACNC: 15 IU/ML (ref 0–15)

## 2024-03-01 NOTE — DISCHARGE SUMMARY
"[facility]  Discharge Summary      Patient Name: Emmanuel Grant  MRN: [id number]  ALVARO: [id number]  Patient Class: IP- Inpatient  Admission Date: 2/28/2024  Hospital Length of Stay: 1 days  Discharge Date and Time: 2/29/2024  3:48 PM  Attending Physician: No att. providers found   Discharging Provider: Marc Li III, MD  Primary Care Provider: Payam Vu Jr., NP    Primary Care Team: Networked reference to record PCT     HPI:   Mr Grant is a 73-year-old male who presents to the emergency room with progressive cough, mild dyspnea to a point where he is not able to speak full sentences.  He reports previous history of type 2 diabetes mellitus, coronary disease with previous PCI, BPH.    He was discharged from [facility] after being admitted there for similar reason, underwent CAT scan that showed interstitial lung disease that was consistent with pulmonary fibrosis and questionably chronic aspiration/reflux.  Patient was evaluated by both pulmonology as well as GI, EGD was performed that showed gastritis without significant esophagitis.  GI recommended continuing PPI and a short course of steroid with outpatient follow-up with pulmonology.  As per pulmonary note he has had cough for over 5 years that has been progressive, ILD panel including autoimmune workup was sent prior to his discharge which was pending.    He reports that after he got discharged, he got "sick again", and has not been able to follow-up with outpatient pulmonary since discharge due to short timeframe and came to our hospital for further evaluation.    In the ER, he was noted to have O2 saturation of 89% requiring 2 L nasal cannula supplementation, his CBC shows normal white count, BMP is grossly normal limits with creatinine of 1.3, which is close to his baseline of 1.1-1.2 and mild hyperglycemia 134.  His chest x-ray again demonstrates chronic interstitial pulmonary fibrosis.  Given requirement of " "oxygen, admission has been requested for further evaluation and treatment.    * No surgery found *      Hospital Course:   73-year-old male who presents to the emergency room with progressive cough, mild dyspnea to a point where he is not able to speak full sentences.  He reports previous history of type 2 diabetes mellitus, coronary disease with previous PCI, BPH.     He was discharged from [facility] after being admitted there for similar reason, underwent CAT scan that showed interstitial lung disease that was consistent with pulmonary fibrosis and questionably chronic aspiration/reflux.  Patient was evaluated by both pulmonology as well as GI, EGD was performed that showed gastritis without significant esophagitis.  GI recommended continuing PPI and a short course of steroid with outpatient follow-up with pulmonology.  As per pulmonary note he has had cough for over 5 years that has been progressive, ILD panel including autoimmune workup was sent prior to his discharge which was pending.     He reports that after he got discharged, he got "sick again", and has not been able to follow-up with outpatient pulmonary since discharge due to short timeframe and came to our hospital for further evaluation.     In the ER, he was noted to have O2 saturation of 89% requiring 2 L nasal cannula supplementation, his CBC shows normal white count, BMP is grossly normal limits with creatinine of 1.3, which is close to his baseline of 1.1-1.2 and mild hyperglycemia 134.  His chest x-ray again demonstrates chronic interstitial pulmonary fibrosis.  Given requirement of oxygen, admission has been requested for further evaluation and treatment.  Walk test performed the following morning and patient without O2 requirement.  Pulmonology consulted and evaluated patient with recommendation for outpatient follow-up for PFTs.  Patient feeling better, but still having some cough.  Patient to be discharged with Solu-Medrol Dosepak as " well as continued use of albuterol inhaler.  Pulmonology referral sent.  Patient agreeable to plan for discharge with all questions answered at bedside.  Patient had friend at bedside as well with her questions answered.  Pulmonology recommending PPI at discharge.  Patient had an allergy listed of hives to pantoprazole, but states he is unaware of where this came from as he is tolerated that medication in the past.  Patient is sent with PPI at discharge.  He was advised if he has any reaction to the medication to take Benadryl and come back for emergent evaluation.    Goals of Care Treatment Preferences:  Code Status: Full Code      Consults:   Consults (From admission, onward)          Status Ordering Provider     Inpatient consult to Pulmonology  Once        Provider:  (Not yet assigned)    MAULIK Benjamin            No new Assessment & Plan notes have been filed under this hospital service since the last note was generated.  Service: Hospital Medicine    Final Active Diagnoses:    Diagnosis Date Noted POA    PRINCIPAL PROBLEM:  Acute hypoxic respiratory failure [J96.01] 02/28/2024 Yes    Interstitial lung disease [J84.9] 02/28/2024 Yes    Coronary artery disease [I25.10] 02/28/2024 Yes    BPH (benign prostatic hyperplasia) [N40.0] 02/28/2024 Yes    Type 2 diabetes mellitus with hyperglycemia [E11.65] 02/28/2024 Yes      Problems Resolved During this Admission:       Discharged Condition: fair    Disposition: Home or Self Care    Follow Up:   Follow-up Information       Payam Vu Jr., NP Follow up.    Specialty: Internal Medicine  Why: please follow-up on 3/5/24 at 11:00am  Contact information:  [address] [place] [address] [id number] [phone]                           Patient Instructions:      Ambulatory referral/consult to Pulmonology   Standing Status: Future   Referral Priority: Routine Referral Type: Consultation   Referral Reason: Specialty Services Required   Requested  Specialty: Pulmonary Disease   Number of Visits Requested: 1     Ambulatory referral/consult to [facility]   Standing Status: Future   Referral Priority: Urgent Referral Type: Consultation   Referral Reason: Specialty Services Required   Number of Visits Requested: 1     Diet diabetic     Notify your health care provider if you experience any of the following:  increased confusion or weakness     Notify your health care provider if you experience any of the following:  persistent dizziness, light-headedness, or visual disturbances     Notify your health care provider if you experience any of the following:  worsening rash     Notify your health care provider if you experience any of the following:  severe persistent headache     Notify your health care provider if you experience any of the following:  difficulty breathing or increased cough     Notify your health care provider if you experience any of the following:  redness, tenderness, or signs of infection (pain, swelling, redness, odor or green/yellow discharge around incision site)     Notify your health care provider if you experience any of the following:  severe uncontrolled pain     Notify your health care provider if you experience any of the following:  persistent nausea and vomiting or diarrhea     Notify your health care provider if you experience any of the following:  temperature >100.4     Activity as tolerated       Significant Diagnostic Studies: Labs: All labs within the past 24 hours have been reviewed    Pending Diagnostic Studies:       Procedure Component Value Units Date/Time    CORAZON [[id number]] Collected: 02/29/24 1106    Order Status: Sent Lab Status: In process Updated: 02/29/24 1747    Specimen: Blood     Cyclic citrul peptide antibody, IgG [[id number]] Collected: 02/29/24 1106    Order Status: Sent Lab Status: In process Updated: 02/29/24 1107    Specimen: Blood     Rheumatoid Factor [[id number]]  Collected: 02/29/24 1106    Order Status: Sent Lab Status: In process Updated: 02/29/24 1107    Specimen: Blood            Medications:  Reconciled Home Medications:      Medication List        START taking these medications      esomeprazole 40 MG capsule  Commonly known as: NEXIUM  Take 1 capsule (40 mg total) by mouth 2 (two) times daily.     methylPREDNISolone 4 mg tablet  Commonly known as: MEDROL DOSEPACK  use as directed            CONTINUE taking these medications      acetaminophen 500 MG tablet  Commonly known as: TYLENOL  Take 1 tablet (500 mg total) by mouth every 4 (four) hours as needed for Pain or Temperature greater than (100.5 or greater).     albuterol 90 mcg/actuation inhaler  Commonly known as: PROVENTIL/VENTOLIN HFA  Inhale 2 puffs into the lungs every 6 (six) hours as needed for Wheezing or Shortness of Breath.     amLODIPine 10 MG tablet  Commonly known as: NORVASC  Take 10 mg by mouth once daily.     aspirin 81 MG Chew  Take 81 mg by mouth once daily.     carvediloL 25 MG tablet  Commonly known as: COREG  Take 25 mg by mouth 2 (two) times daily with meals.     fluticasone propionate 50 mcg/actuation nasal spray  Commonly known as: FLONASE  1 spray (50 mcg total) by Each Nostril route once daily.     glipiZIDE 10 MG tablet  Commonly known as: GLUCOTROL  Take 10 mg by mouth 2 (two) times daily before meals.     loratadine 10 mg tablet  Commonly known as: CLARITIN  Take 1 tablet (10 mg total) by mouth every morning.     metFORMIN 1000 MG tablet  Commonly known as: GLUCOPHAGE  Take 0.5 tablets (500 mg total) by mouth daily with breakfast.     montelukast 5 MG chewable tablet  Commonly known as: SINGULAIR  Take 1 tablet (5 mg total) by mouth every evening.     polyethylene glycol 17 gram/dose powder  Commonly known as: GLYCOLAX  Take 17 g by mouth once daily. Take daily to prevent constipation     pravastatin 40 MG tablet  Commonly known as: PRAVACHOL  Take 40 mg by mouth once daily.      sucralfate 1 gram tablet  Commonly known as: CARAFATE  Take 1 tablet (1 g total) by mouth 4 (four) times daily before meals and nightly.     tamsulosin 0.4 mg Cap  Commonly known as: FLOMAX  Take 1 capsule (0.4 mg total) by mouth once daily.            STOP taking these medications      famotidine 20 MG tablet  Commonly known as: PEPCID              Indwelling Lines/Drains at time of discharge:   Lines/Drains/Airways       None                   Time spent on the discharge of patient: 44 minutes         Marc Li III, MD  Department of [facility]  [facility]

## 2024-03-01 NOTE — HOSPITAL COURSE
"73-year-old male who presents to the emergency room with progressive cough, mild dyspnea to a point where he is not able to speak full sentences.  He reports previous history of type 2 diabetes mellitus, coronary disease with previous PCI, BPH.     He was discharged from [facility] after being admitted there for similar reason, underwent CAT scan that showed interstitial lung disease that was consistent with pulmonary fibrosis and questionably chronic aspiration/reflux.  Patient was evaluated by both pulmonology as well as GI, EGD was performed that showed gastritis without significant esophagitis.  GI recommended continuing PPI and a short course of steroid with outpatient follow-up with pulmonology.  As per pulmonary note he has had cough for over 5 years that has been progressive, ILD panel including autoimmune workup was sent prior to his discharge which was pending.     He reports that after he got discharged, he got "sick again", and has not been able to follow-up with outpatient pulmonary since discharge due to short timeframe and came to our hospital for further evaluation.     In the ER, he was noted to have O2 saturation of 89% requiring 2 L nasal cannula supplementation, his CBC shows normal white count, BMP is grossly normal limits with creatinine of 1.3, which is close to his baseline of 1.1-1.2 and mild hyperglycemia 134.  His chest x-ray again demonstrates chronic interstitial pulmonary fibrosis.  Given requirement of oxygen, admission has been requested for further evaluation and treatment.  Walk test performed the following morning and patient without O2 requirement.  Pulmonology consulted and evaluated patient with recommendation for outpatient follow-up for PFTs.  Patient feeling better, but still having some cough.  Patient to be discharged with Solu-Medrol Dosepak as well as continued use of albuterol inhaler.  Pulmonology referral sent.  Patient agreeable to plan for discharge with all " questions answered at bedside.  Patient had friend at bedside as well with her questions answered.

## 2024-03-04 ENCOUNTER — OFFICE VISIT (OUTPATIENT)
Dept: GASTROENTEROLOGY | Facility: CLINIC | Age: 74
End: 2024-03-04
Payer: MEDICARE

## 2024-03-04 VITALS
HEIGHT: 68 IN | BODY MASS INDEX: 22.57 KG/M2 | WEIGHT: 148.94 LBS | DIASTOLIC BLOOD PRESSURE: 72 MMHG | HEART RATE: 69 BPM | SYSTOLIC BLOOD PRESSURE: 120 MMHG

## 2024-03-04 DIAGNOSIS — Z86.010 PERSONAL HISTORY OF COLONIC POLYPS: ICD-10-CM

## 2024-03-04 DIAGNOSIS — K22.719 BARRETT'S ESOPHAGUS WITH DYSPLASIA: Primary | ICD-10-CM

## 2024-03-04 DIAGNOSIS — C19 FAMILIAL COLORECTAL CANCER: ICD-10-CM

## 2024-03-04 PROCEDURE — 99999 PR PBB SHADOW E&M-EST. PATIENT-LVL IV: CPT | Mod: PBBFAC,,, | Performed by: INTERNAL MEDICINE

## 2024-03-04 PROCEDURE — 99213 OFFICE O/P EST LOW 20 MIN: CPT | Mod: S$GLB,,, | Performed by: INTERNAL MEDICINE

## 2024-03-04 RX ORDER — ATORVASTATIN CALCIUM 40 MG/1
40 TABLET, FILM COATED ORAL NIGHTLY
COMMUNITY
Start: 2023-12-26 | End: 2024-05-06 | Stop reason: SDUPTHER

## 2024-03-04 RX ORDER — PREDNISONE 20 MG/1
20 TABLET ORAL
COMMUNITY
Start: 2024-02-21 | End: 2024-04-20

## 2024-03-04 RX ORDER — ACETAMINOPHEN 500 MG
1 TABLET ORAL
COMMUNITY

## 2024-03-04 NOTE — PROGRESS NOTES
Subjective:       Patient ID: Emmanuel Grant is a 73 y.o. male.    Chief Complaint: Cough (Coughing clear mucus)      HPI Patient had been following up with another GI MD. Transferred his care to us. All medical record sent were reviewed.  He report that he feels much better after he discontinued eating tameka.  No new complaints today.    Review of Systems   Constitutional:  Negative for fever.   HENT:  Negative for sore throat.    Eyes:  Negative for visual disturbance.   Respiratory:  Negative for shortness of breath and wheezing.    Gastrointestinal:  Negative for abdominal pain and diarrhea.   Genitourinary:  Negative for frequency and hematuria.   Neurological:  Negative for weakness and headaches.   Psychiatric/Behavioral:  Negative for behavioral problems and suicidal ideas.        Objective:      Physical Exam  Eyes:      Pupils: Pupils are equal, round, and reactive to light.   Cardiovascular:      Rate and Rhythm: Normal rate and regular rhythm.      Heart sounds: Normal heart sounds.   Pulmonary:      Effort: No respiratory distress.      Breath sounds: No wheezing.   Abdominal:      Palpations: There is no mass.      Tenderness: There is no abdominal tenderness. There is no guarding or rebound.   Neurological:      Mental Status: He is alert and oriented to person, place, and time.       Assessment:       1. Ramirez's esophagus with dysplasia    2. Personal history of colonic polyps    3. Familial colorectal cancer        Plan:       Emmanuel was seen today for cough.    Diagnoses and all orders for this visit:    Ramirez's esophagus with dysplasia  Personal history of colonic polyps  Familial colorectal cancer      -     polyethylene glycol (COLYTE) 240-22.72-6.72 -5.84 gram SolR; Take 4,000 mLs by mouth once. for 1 dose  -     Continue PPI

## 2024-03-07 NOTE — PHYSICIAN QUERY
PT Name: Emmanuel Grant  MR #: [id number]     DOCUMENTATION CLARIFICATION     CDS: Beth Brock RN, CCDS   Contact Information: [email or web address]    This form is a permanent document in the medical record.     Query Date: March 7, 2024    By submitting this query, we are merely seeking further clarification of documentation.  Please utilize your independent clinical judgment when addressing the question(s) below.  The Medical Record contains the following   Indicators   Supporting Clinical Findings Location in Medical Record   x Documentation of Respiratory Failure, ARDS Acute hypoxic respiratory failure 2/28/24-2/29/24 Hospital Medicine   x Subjective Respiratory Signs/Symptoms Positive for cough, chest tightness and shortness of breath.  2/28/24 ED   x Objective Respiratory Signs/Symptoms He is not diaphoretic.   No stridor. No respiratory distress. He has wheezes. He has no rhonchi. He has no rales.     No apparent cardiopulmonary distress 2/28/24 ED        2/28/24 H&P   x RR     O2 sat     O2 use 2/28/24 1436: R 18, SpO2 96% on Room Air  2/28/24 1647: R ---, SpO2 89% no oxygen documented  2/28/24 1857: R 20, SpO2 93% on 2L via Nasal Cannula  2/29/24 1307:R 17, SpO2 95% on Room Air Flowsheets   x Hypoxia/Hypercapnia Hypoxia  He is walking pulse ox of 87%.  2/28/24 ED   x Acute/Chronic Illness 73-year-old male with a history of diabetes, MI, coronary artery disease, interstitial lung disease presenting with shortness of breath, fatigue, chest pain pressure.   He apparently has a history of pulmonary fibrosis although he does not know much about this diagnosis.  2/28/24 ED   x Treatment -Continue oxygen supplementation to keep saturation over 94%.  -Request pulmonology consultation.  -Appears that he had pulmonary and GI consultations during recent hospitalization and autoimmune panel etc. were sent which are pending.  -Would recommend close outpatient follow-up with pulmonology upon discharge.  -May  need 6-minute walk test for evaluation of outpatient oxygen requirement. 2/28/24 H&P       The clinical guidelines noted are only a system guideline. It does not replace the providers clinical judgment.    [facility] Approved Diagnostic Criteria      Acute Respiratory Failure    Hypoxic: ABG pO2<60 mmHg or O2 sat of <91% on RA   AND/OR   Hypercapnic: ABG pCO2>50 mmHg with pH <7.35   AND   Respiratory symptoms documented (Subjective: SOB; Objective: Tachypnea, respiratory distress, increased work of breathing, unable to speak in complete sentences, labored breathing, use of accessory muscles, RR>26, cyanosis, dyspnea, wheezing, stridor, lethargy)      Chronic Respiratory Failure   Hypoxic: Continuous home oxygen    AND/OR   Hypercapnic: Normal pH with high CO2 (ex. COPD)      Acute on Chronic Respiratory Failure   Hypoxic: ABG pO2>10mmHg below baseline OR ABG pO2<60 mmHg OR SpO2<91% on usual home O2  OR O2>2L/min over baseline home O2   AND/OR   Hypercapnic: ABG pCO2>50 mmHg OR pCO2>10mmHg over baseline and pH <7.35   AND   Respiratory symptoms documented      Acute Respiratory Distress Syndrome (ARDS) - an acute, diffuse, inflammatory form of lung injury. Suspect with progressive dyspnea, hypoxemic respiratory failure, and bilateral alveolar infiltrates on chest imaging within 6 to 72 hours of an inciting event.   Acute Respiratory Distress - Generally describes less severe respiratory symptoms (tachypnea, in respiratory distress, increased work of breathing, unable to speak in complete sentences, labored breathing, use of accessory muscles, RR> 24, cyanosis, dyspnea, wheezing, stridor, lethargy) without sufficient measurements (pO2, SpO2, pH, and pCO2) to meet criteria for respiratory failure)   Acute Respiratory Insufficiency - Generally describes less severe respiratory symptoms and measurements (pO2, SpO2, pH, and pCO2) not meeting criteria for respiratory failure         Due to the conflicting  clinical picture, please clinically validate the diagnosis of Acute Hypoxic Respiratory Failure.    If validated, please provide additional clinical support for the diagnosis.     [  x  ] Acute Hypoxic Respiratory Failure has been ruled out   [    ] Acute Hypoxic Respiratory Failure has been ruled out, other diagnosis ruled in:   [   ] Hypoxia only   [   ] Acute Respiratory Insufficiency   [   ] Other (please specify):_________________   [    ] Acute Respiratory Failure with Hypoxia diagnosis is confirmed and additional clinical support/decision-making indicators for the diagnosis include (please specify):_______________________________________________   [    ] Other clarification (please specify): ___________________     Please document in your progress notes daily for the duration of treatment until resolved and include in your discharge summary.     Reference:    ROBERT Vang MD. (2020, March 13). Acute respiratory distress syndrome: Clinical features, diagnosis, and complications in adults ([id number] ALYSA Burnett MD & [id number] ISIAH Lutz MD, Eds.). Retrieved November 13, 2020, from [email or web address]&source=search_result&selectedTitle=1~150&usage_type=default&display_rank=1  Form No. 12659

## 2024-03-13 ENCOUNTER — HOSPITAL ENCOUNTER (EMERGENCY)
Facility: HOSPITAL | Age: 74
Discharge: HOME | End: 2024-03-13
Attending: INTERNAL MEDICINE
Payer: MEDICARE

## 2024-03-13 ENCOUNTER — HOSPITAL ENCOUNTER (EMERGENCY)
Facility: HOSPITAL | Age: 74
Discharge: HOME | End: 2024-03-13
Attending: EMERGENCY MEDICINE
Payer: MEDICARE

## 2024-03-13 VITALS
OXYGEN SATURATION: 95 % | HEART RATE: 68 BPM | SYSTOLIC BLOOD PRESSURE: 148 MMHG | BODY MASS INDEX: 21.98 KG/M2 | WEIGHT: 145 LBS | TEMPERATURE: 98 F | HEIGHT: 68 IN | RESPIRATION RATE: 18 BRPM | DIASTOLIC BLOOD PRESSURE: 91 MMHG

## 2024-03-13 VITALS
WEIGHT: 145 LBS | SYSTOLIC BLOOD PRESSURE: 124 MMHG | OXYGEN SATURATION: 95 % | RESPIRATION RATE: 16 BRPM | HEIGHT: 68 IN | HEART RATE: 92 BPM | DIASTOLIC BLOOD PRESSURE: 82 MMHG | TEMPERATURE: 98 F | BODY MASS INDEX: 21.98 KG/M2

## 2024-03-13 DIAGNOSIS — T88.7XXA SIDE EFFECT OF MEDICATION: Primary | ICD-10-CM

## 2024-03-13 DIAGNOSIS — K59.00 CONSTIPATION: ICD-10-CM

## 2024-03-13 LAB
POCT GLUCOSE: 103 MG/DL (ref 70–110)
POCT GLUCOSE: 128 MG/DL (ref 70–110)

## 2024-03-13 PROCEDURE — 82962 GLUCOSE BLOOD TEST: CPT | Mod: ER

## 2024-03-13 PROCEDURE — 82962 GLUCOSE BLOOD TEST: CPT

## 2024-03-13 PROCEDURE — 99282 EMERGENCY DEPT VISIT SF MDM: CPT | Mod: 25,ER

## 2024-03-13 PROCEDURE — 99283 EMERGENCY DEPT VISIT LOW MDM: CPT | Mod: 25,27

## 2024-03-13 RX ORDER — SYRING-NEEDL,DISP,INSUL,0.3 ML 29 G X1/2"
SYRINGE, EMPTY DISPOSABLE MISCELLANEOUS
Qty: 148 ML | Refills: 0 | Status: SHIPPED | OUTPATIENT
Start: 2024-03-13

## 2024-03-13 NOTE — ED TRIAGE NOTES
"Emmanuel Grant, a 73 y.o. male presents to the ED w/ complaint of feeling fatigued and restless. Pt reports he just finished taking a steroid pack yesterday prescribed by his PCP.    Triage note:  Chief Complaint   Patient presents with    Fatigue     C/o restlessness and fatigue. Pt reports medications are making him "feel funny" and causing insomnia. No changes in daily or PRN meds. Pt complaints of occasional acid reflux.     Review of patient's allergies indicates:   Allergen Reactions    Dapagliflozin      Other reaction(s): Other (See Comments)    Pcn [penicillins]     Linagliptin Other (See Comments)     "it knocked me down", "it almost killed me"    Lisinopril Other (See Comments)     cough    Pantoprazole Hives     Past Medical History:   Diagnosis Date    Diabetes mellitus     Hypertension     Kidney stone     Stroke      "

## 2024-03-13 NOTE — ED PROVIDER NOTES
"Encounter Date: 3/13/2024    SCRIBE #1 NOTE: I, Andreas Meehan, am scribing for, and in the presence of,  Loreto Pascal MD. I have scribed the following portions of the note - Other sections scribed: HPI,ROS,PE.       History     Chief Complaint   Patient presents with    Abdominal Pain    Constipation     Pt reports constipation x3 days accompanied by abdominal pain and headache. Pt reports being seen last night and was told to take miralax which he has been without relief.      Mr. Emmanuel Grant is a 73 y.o. male experiencing constipation for three days. Patient is also experiencing diffuse abdominal pain. Patient states he has not had a bowel movement in three days. This has happened multiple times previously and he notes the symptom is usually alleviated by drinking lots of water. Currently he is prescribed polyethylene glycol until 3/17. He has attempted to drink lots of water for the past few days and also took one dose of Miralax today with no relief. No other meds taken. Patient normally has one to two bowel movements daily. Describes the constipation as "it feels like there is a hard ball right at my rectum." Reports having a scope done about one month ago. He is seen by Dr. Centeno for his reflux. Patient has a PMHx significant for DM, HTN, acid reflux, and CVA. Denies associated urinary complications.     The history is provided by the patient. No  was used.     Review of patient's allergies indicates:   Allergen Reactions    Dapagliflozin      Other reaction(s): Other (See Comments)    Pcn [penicillins]     Linagliptin Other (See Comments)     "it knocked me down", "it almost killed me"    Lisinopril Other (See Comments)     cough    Pantoprazole Hives     Past Medical History:   Diagnosis Date    Diabetes mellitus     Hypertension     Kidney stone     Stroke      Past Surgical History:   Procedure Laterality Date    SHOULDER SURGERY Right      Family History   Problem Relation Age " of Onset    Colon cancer Sister     Esophageal cancer Neg Hx      Social History     Tobacco Use    Smoking status: Never     Passive exposure: Never    Smokeless tobacco: Never   Substance Use Topics    Alcohol use: No    Drug use: Never     Review of Systems   Constitutional:  Positive for chills.   HENT: Negative.     Cardiovascular: Negative.    Gastrointestinal:  Positive for constipation. Negative for anal bleeding, diarrhea, nausea and vomiting.   Genitourinary: Negative.  Negative for dysuria, frequency, hematuria and urgency.   Musculoskeletal: Negative.    Skin: Negative.    Neurological:  Positive for headaches.        Mild generalized, described as 'because I'm so upset about my constipation'   Psychiatric/Behavioral: Negative.     All other systems reviewed and are negative.      Physical Exam     Initial Vitals [03/13/24 1707]   BP Pulse Resp Temp SpO2   116/84 93 18 97.4 °F (36.3 °C) 95 %      MAP       --         Physical Exam    Nursing note and vitals reviewed.  Constitutional: He appears well-developed and well-nourished. He is not diaphoretic. No distress.   Appears mildly uncomfortable.   HENT:   Head: Normocephalic and atraumatic.   Eyes: Conjunctivae and EOM are normal.   Cardiovascular:  Normal rate, regular rhythm and normal heart sounds.           Pulmonary/Chest: Breath sounds normal. No respiratory distress. He has no wheezes.   Abdominal: Abdomen is soft. He exhibits no distension. There is no abdominal tenderness. There is no rebound and no guarding.   Musculoskeletal:         General: No tenderness or edema. Normal range of motion.     Neurological: He is alert and oriented to person, place, and time. GCS score is 15. GCS eye subscore is 4. GCS verbal subscore is 5. GCS motor subscore is 6.   Skin: Skin is warm and dry.   Psychiatric: He has a normal mood and affect. His behavior is normal. Thought content normal.         ED Course   Procedures  Labs Reviewed   POCT GLUCOSE - Abnormal;  Notable for the following components:       Result Value    POCT Glucose 128 (*)     All other components within normal limits   URINALYSIS, REFLEX TO URINE CULTURE          Imaging Results              X-Ray Abdomen Flat And Erect (Final result)  Result time 03/13/24 17:33:35      Final result by Elidia Dent MD (03/13/24 17:33:35)                   Impression:      Nonobstructive bowel gas pattern.  Moderately large colonic stool.  Question constipation.      Electronically signed by: Elidia Dent  Date:    03/13/2024  Time:    17:33               Narrative:    EXAMINATION:  ABDOMEN FLAT AND ERECT    CLINICAL HISTORY:  Constipation, unspecified    TECHNIQUE:  Abdomen flat and erect radiographs were submitted.    COMPARISON:  None.    FINDINGS:  Abdomen flat and erect radiographs demonstrate a nonspecific bowel gas pattern.  There are no dilated loops of small bowel or air-fluid levels detected.  There is no free air detected under the diaphragm.  Moderately large colonic stool is present.  At the lung bases, there are prominent interstitial lung markings.                                       Medications - No data to display  Medical Decision Making  73-year-old with history of constipation off and on for years, but has never been this bad.  Reports he is usually able to relieve constipation by drinking lot of water but that has not working this time.  Took MiraLax x1 today.  He is nontoxic and well-appearing, but appears uncomfortable.  We discussed the options for treatment.  X-ray reviewed.  He does have some colonic stool but no overt signs of obstruction on plain films.  His abdomen is soft and nontender.      He allowed me to attempt a bedside disimpaction.  Nursing present.  Moderate amount of stool in rectal vault, and I was able to disimpact some.  He will attempt to use the restroom to see how much relief he can get. Loreto Pascal MD, 03/13/2024 6:00 PM    Pt returned from restroom and  reports significant relief, reports 2 large stools. Feels better. States abdomen is smaller and he has no symptoms. He would like to go home and continue to get more stool out on his own at home.  Offer of ED enema politely declined. He has no other complaints. We discussed home care options and return precautions. He is stable for d/c. There is no indication for further emergent intervention or evaluation at this time.             Amount and/or Complexity of Data Reviewed  Labs: ordered.  Radiology: ordered.    Risk  OTC drugs.            Scribe Attestation:   Scribe #1: I performed the above scribed service and the documentation accurately describes the services I performed. I attest to the accuracy of the note.                               Clinical Impression:  Final diagnoses:  [K59.00] Constipation       I, Loreto Pascal MD, personally performed the services described in this documentation. All medical record entries made by the scribe were at my direction and in my presence. I have reviewed the chart and agree that the record reflects my personal performance and is accurate and complete.     ED Disposition Condition    Discharge Stable          ED Prescriptions       Medication Sig Dispense Start Date End Date Auth. Provider    magnesium citrate solution Take half the bottle for constipation as needed. 148 mL 3/13/2024 -- Loreto Pascal MD          Follow-up Information       Follow up With Specialties Details Why Contact [facility] - Emergency Dept Emergency Medicine  As needed, If symptoms worsen [id number] [facility] [place] [address] [phone]    Payam Vu Jr., NP Internal Medicine In 2 days recheck 2 [facility]  PRIMARY CARE [facility] [id number] [phone]               Loreto Pascal MD  03/14/24 9214

## 2024-03-13 NOTE — ED PROVIDER NOTES
"Encounter Date: 3/13/2024       History     Chief Complaint   Patient presents with    Fatigue     C/o restlessness and fatigue. Pt reports medications are making him "feel funny" and causing insomnia. No changes in daily or PRN meds. Pt complaints of occasional acid reflux.     73-year-old male with a past medical history significant for diabetes mellitus, hypertension, kidney stone and CVA presents to the emergency department complaining of difficulty sleeping for the last several days.  He states whenever he gets ready to go to sleep, he feels jittery and can not stay in his bed.  He denies pain/fever/chills/nausea/vomiting/chest pain/shortness of breath.  He states he recently saw his doctor and was started on a medicine for reflux and another medicine.    The history is provided by the patient. No  was used.     Review of patient's allergies indicates:   Allergen Reactions    Dapagliflozin      Other reaction(s): Other (See Comments)    Pcn [penicillins]     Linagliptin Other (See Comments)     "it knocked me down", "it almost killed me"    Lisinopril Other (See Comments)     cough    Pantoprazole Hives     Past Medical History:   Diagnosis Date    Diabetes mellitus     Hypertension     Kidney stone     Stroke      Past Surgical History:   Procedure Laterality Date    SHOULDER SURGERY Right      Family History   Problem Relation Age of Onset    Colon cancer Sister     Esophageal cancer Neg Hx      Social History     Tobacco Use    Smoking status: Never     Passive exposure: Never    Smokeless tobacco: Never   Substance Use Topics    Alcohol use: No    Drug use: Never     Review of Systems   Constitutional:  Negative for chills and fever.   Respiratory:  Negative for cough.    Cardiovascular:  Negative for chest pain.   Gastrointestinal:  Negative for nausea and vomiting.   Psychiatric/Behavioral:  Positive for sleep disturbance. Negative for confusion, hallucinations, self-injury and " suicidal ideas. The patient is not nervous/anxious and is not hyperactive.    All other systems reviewed and are negative.      Physical Exam     Initial Vitals [03/13/24 0201]   BP Pulse Resp Temp SpO2   (!) 148/91 68 18 98.4 °F (36.9 °C) 95 %      MAP       --         Physical Exam    Nursing note and vitals reviewed.  Constitutional: He is not diaphoretic. No distress.   HENT:   Head: Normocephalic and atraumatic.   Right Ear: External ear normal.   Left Ear: External ear normal.   Mouth/Throat: Oropharynx is clear and moist.   Eyes: Conjunctivae and EOM are normal. Pupils are equal, round, and reactive to light.   Neck: Neck supple.   Normal range of motion.  Cardiovascular:  Normal rate and regular rhythm.           Pulmonary/Chest: Breath sounds normal. No respiratory distress.   Abdominal: Abdomen is soft. Bowel sounds are normal. There is no abdominal tenderness.   Musculoskeletal:      Cervical back: Normal range of motion and neck supple.     Neurological: He is alert. He has normal strength.   Skin: Skin is warm and dry. Capillary refill takes less than 2 seconds.   Psychiatric: He has a normal mood and affect. Thought content normal.         ED Course   Procedures  Labs Reviewed   POCT GLUCOSE, HAND-HELD DEVICE   POCT GLUCOSE          Imaging Results    None          Medications - No data to display  Medical Decision Making  73-year-old male with a past medical history significant for diabetes mellitus, hypertension, kidney stone and CVA presents to the emergency department complaining of difficulty sleeping for the last several days.  He states whenever he gets ready to go to sleep, he feels jittery and can not stay in his bed.  He denies pain/fever/chills/nausea/vomiting/chest pain/shortness of breath.  He states he recently saw his doctor and was started on a medicine for reflux and another medicine.  Course of ED stay:   Upon further review, patient states symptoms began after he started taking  Medrol Dosepak.  He states he took his last dose yesterday.  Patient was counseled on side-effects of systemic steroids, including hyperactivity and difficulty sleeping.  He was reassured that his ability to sleep should improve after discontinuing Medrol Dosepak.  He was also advised to follow-up with his primary care physician within the next week for re-evaluation/return to the emergency department if condition worsens.    Amount and/or Complexity of Data Reviewed  Labs: ordered.                                      Clinical Impression:  Final diagnoses:  [T88.7XXA] Side effect of medication (Primary)          ED Disposition Condition    Discharge Stable          ED Prescriptions    None       Follow-up Information       Follow up With Specialties Details Why Contact Info    Payam Vu Jr., NP Internal Medicine Schedule an appointment as soon as possible for a visit in 2 days For reevaluation [address] [facility] [id number] [phone]               Aaron Ruiz MD  03/13/24 5780

## 2024-03-14 NOTE — DISCHARGE INSTRUCTIONS
I'm glad you're feeling better! It was a pleasure taking care of you. Thank you for your patience.   Rest. Drink plenty of fluids. Return for any new or acute problems or concerns.

## 2024-03-25 ENCOUNTER — TELEPHONE (OUTPATIENT)
Dept: GASTROENTEROLOGY | Facility: CLINIC | Age: 74
End: 2024-03-25
Payer: MEDICARE

## 2024-03-26 LAB
LEFT EYE DM RETINOPATHY: NEGATIVE
RIGHT EYE DM RETINOPATHY: NEGATIVE

## 2024-03-27 ENCOUNTER — ANESTHESIA (OUTPATIENT)
Dept: ENDOSCOPY | Facility: HOSPITAL | Age: 74
End: 2024-03-27
Payer: MEDICARE

## 2024-03-27 ENCOUNTER — HOSPITAL ENCOUNTER (OUTPATIENT)
Facility: HOSPITAL | Age: 74
Discharge: HOME | End: 2024-03-27
Attending: INTERNAL MEDICINE | Admitting: INTERNAL MEDICINE
Payer: MEDICARE

## 2024-03-27 ENCOUNTER — ANESTHESIA EVENT (OUTPATIENT)
Dept: ENDOSCOPY | Facility: HOSPITAL | Age: 74
End: 2024-03-27
Payer: MEDICARE

## 2024-03-27 VITALS
HEART RATE: 67 BPM | OXYGEN SATURATION: 94 % | SYSTOLIC BLOOD PRESSURE: 152 MMHG | RESPIRATION RATE: 14 BRPM | TEMPERATURE: 98 F | DIASTOLIC BLOOD PRESSURE: 87 MMHG

## 2024-03-27 DIAGNOSIS — K21.9 GASTROESOPHAGEAL REFLUX DISEASE WITHOUT ESOPHAGITIS: Primary | ICD-10-CM

## 2024-03-27 LAB — POCT GLUCOSE: 131 MG/DL (ref 70–110)

## 2024-03-27 PROCEDURE — 45385 COLONOSCOPY W/LESION REMOVAL: CPT | Mod: PT | Performed by: INTERNAL MEDICINE

## 2024-03-27 PROCEDURE — 63600175 PHARM REV CODE 636 W HCPCS: Performed by: NURSE ANESTHETIST, CERTIFIED REGISTERED

## 2024-03-27 PROCEDURE — 88305 TISSUE EXAM BY PATHOLOGIST: CPT | Performed by: PATHOLOGY

## 2024-03-27 PROCEDURE — 63600175 PHARM REV CODE 636 W HCPCS

## 2024-03-27 PROCEDURE — 45385 COLONOSCOPY W/LESION REMOVAL: CPT | Mod: PT,,, | Performed by: INTERNAL MEDICINE

## 2024-03-27 PROCEDURE — 43235 EGD DIAGNOSTIC BRUSH WASH: CPT | Performed by: INTERNAL MEDICINE

## 2024-03-27 PROCEDURE — 27201089 HC SNARE, DISP (ANY): Performed by: INTERNAL MEDICINE

## 2024-03-27 PROCEDURE — 37000008 HC ANESTHESIA 1ST 15 MINUTES: Performed by: INTERNAL MEDICINE

## 2024-03-27 PROCEDURE — D9220A PRA ANESTHESIA: Mod: ANES,,, | Performed by: ANESTHESIOLOGY

## 2024-03-27 PROCEDURE — 43235 EGD DIAGNOSTIC BRUSH WASH: CPT | Mod: 51,,, | Performed by: INTERNAL MEDICINE

## 2024-03-27 PROCEDURE — 88305 TISSUE EXAM BY PATHOLOGIST: CPT | Mod: 26,,, | Performed by: PATHOLOGY

## 2024-03-27 PROCEDURE — 25000003 PHARM REV CODE 250: Performed by: INTERNAL MEDICINE

## 2024-03-27 PROCEDURE — 37000009 HC ANESTHESIA EA ADD 15 MINS: Performed by: INTERNAL MEDICINE

## 2024-03-27 PROCEDURE — D9220A PRA ANESTHESIA: Mod: CRNA,,, | Performed by: NURSE ANESTHETIST, CERTIFIED REGISTERED

## 2024-03-27 PROCEDURE — 25000003 PHARM REV CODE 250: Performed by: NURSE ANESTHETIST, CERTIFIED REGISTERED

## 2024-03-27 RX ORDER — LIDOCAINE HYDROCHLORIDE 20 MG/ML
INJECTION, SOLUTION EPIDURAL; INFILTRATION; INTRACAUDAL; PERINEURAL
Status: DISCONTINUED
Start: 2024-03-27 | End: 2024-03-27 | Stop reason: HOSPADM

## 2024-03-27 RX ORDER — LABETALOL HYDROCHLORIDE 5 MG/ML
INJECTION, SOLUTION INTRAVENOUS
Status: COMPLETED
Start: 2024-03-27 | End: 2024-03-27

## 2024-03-27 RX ORDER — PROPOFOL 10 MG/ML
VIAL (ML) INTRAVENOUS
Status: DISCONTINUED | OUTPATIENT
Start: 2024-03-27 | End: 2024-03-27

## 2024-03-27 RX ORDER — SODIUM CHLORIDE 9 MG/ML
INJECTION, SOLUTION INTRAVENOUS CONTINUOUS
Status: DISCONTINUED | OUTPATIENT
Start: 2024-03-27 | End: 2024-03-27 | Stop reason: HOSPADM

## 2024-03-27 RX ORDER — LABETALOL HYDROCHLORIDE 5 MG/ML
10 INJECTION, SOLUTION INTRAVENOUS ONCE
Status: DISCONTINUED | OUTPATIENT
Start: 2024-03-27 | End: 2024-03-27 | Stop reason: HOSPADM

## 2024-03-27 RX ORDER — LIDOCAINE HYDROCHLORIDE 40 MG/ML
SOLUTION TOPICAL
Status: DISCONTINUED | OUTPATIENT
Start: 2024-03-27 | End: 2024-03-27

## 2024-03-27 RX ORDER — LIDOCAINE HYDROCHLORIDE 20 MG/ML
INJECTION INTRAVENOUS
Status: DISCONTINUED | OUTPATIENT
Start: 2024-03-27 | End: 2024-03-27

## 2024-03-27 RX ORDER — PROPOFOL 10 MG/ML
VIAL (ML) INTRAVENOUS
Status: DISCONTINUED
Start: 2024-03-27 | End: 2024-03-27 | Stop reason: HOSPADM

## 2024-03-27 RX ADMIN — LABETALOL HYDROCHLORIDE 10 MG: 5 INJECTION, SOLUTION INTRAVENOUS at 10:03

## 2024-03-27 RX ADMIN — PROPOFOL 20 MG: 10 INJECTION, EMULSION INTRAVENOUS at 11:03

## 2024-03-27 RX ADMIN — PROPOFOL 40 MG: 10 INJECTION, EMULSION INTRAVENOUS at 11:03

## 2024-03-27 RX ADMIN — LIDOCAINE HYDROCHLORIDE 100 MG: 20 INJECTION, SOLUTION INTRAVENOUS at 11:03

## 2024-03-27 RX ADMIN — PROPOFOL 80 MG: 10 INJECTION, EMULSION INTRAVENOUS at 11:03

## 2024-03-27 RX ADMIN — LIDOCAINE HYDROCHLORIDE 3 ML: 40 SOLUTION TOPICAL at 11:03

## 2024-03-27 RX ADMIN — SODIUM CHLORIDE: 0.9 INJECTION, SOLUTION INTRAVENOUS at 10:03

## 2024-03-27 NOTE — ANESTHESIA POSTPROCEDURE EVALUATION
Anesthesia Post Evaluation    Patient: Emmanuel Grant    Procedure(s) Performed: Procedure(s) (LRB):  COLONOSCOPY (N/A)  EGD (ESOPHAGOGASTRODUODENOSCOPY) (N/A)    Final Anesthesia Type: general      Patient location during evaluation: GI PACU  Patient participation: Yes- Able to Participate  Level of consciousness: awake and alert and oriented  Post-procedure vital signs: reviewed and stable  Pain management: adequate  Airway patency: patent    PONV status at discharge: No PONV  Anesthetic complications: no      Cardiovascular status: blood pressure returned to baseline and hemodynamically stable  Respiratory status: unassisted, spontaneous ventilation and room air  Hydration status: euvolemic  Follow-up not needed.              Vitals Value Taken Time   /87 03/27/24 1154   Temp 36.7 °C (98.1 °F) 03/27/24 1139   Pulse 67 03/27/24 1154   Resp 14 03/27/24 1154   SpO2 94 % 03/27/24 1154         Event Time   Out of Recovery 12:17:45         Pain/Augustine Score: Augustine Score: 10 (3/27/2024 11:54 AM)

## 2024-03-27 NOTE — ANESTHESIA PREPROCEDURE EVALUATION
"                                                                                                             03/27/2024    Pre-operative evaluation for Procedure(s) (LRB):  COLONOSCOPY (N/A)  EGD (ESOPHAGOGASTRODUODENOSCOPY) (N/A)    Emmanuel Grant is a 73 y.o. male     Patient Active Problem List   Diagnosis    Neck mass    Type 2 diabetes mellitus without complication, without long-term current use of insulin    Acute hypoxic respiratory failure    Interstitial lung disease    Coronary artery disease    BPH (benign prostatic hyperplasia)    Type 2 diabetes mellitus with hyperglycemia    Side effect of medication       Review of patient's allergies indicates:   Allergen Reactions    Dapagliflozin      Other reaction(s): Other (See Comments)    Pcn [penicillins]     Linagliptin Other (See Comments)     "it knocked me down", "it almost killed me"    Lisinopril Other (See Comments)     cough    Pantoprazole Hives       No current facility-administered medications on file prior to encounter.     Current Outpatient Medications on File Prior to Encounter   Medication Sig Dispense Refill    acetaminophen (TYLENOL) 500 MG tablet Take 1 tablet (500 mg total) by mouth every 4 (four) hours as needed for Pain or Temperature greater than (100.5 or greater). 30 tablet 0    albuterol (PROVENTIL/VENTOLIN HFA) 90 mcg/actuation inhaler Inhale 2 puffs into the lungs every 6 (six) hours as needed for Wheezing or Shortness of Breath. 18 g 0    amlodipine (NORVASC) 10 MG tablet Take 5 mg by mouth once daily.      aspirin 81 MG Chew Take 81 mg by mouth once daily.      atorvastatin (LIPITOR) 40 MG tablet Take 40 mg by mouth every evening.      carvedilol (COREG) 25 MG tablet Take 25 mg by mouth 2 (two) times daily with meals.      cholecalciferol, vitamin D3, (VITAMIN D3) 50 mcg (2,000 unit) Cap capsule 1 capsule.      esomeprazole (NEXIUM) 40 MG capsule Take 1 capsule (40 mg total) by mouth 2 (two) times daily. 60 capsule 11    " fluticasone propionate (FLONASE) 50 mcg/actuation nasal spray 1 spray (50 mcg total) by Each Nostril route once daily. 15 g 0    glipiZIDE (GLUCOTROL) 10 MG tablet Take 10 mg by mouth 2 (two) times daily before meals.      loratadine (CLARITIN) 10 mg tablet Take 1 tablet (10 mg total) by mouth every morning. 60 tablet 0    metFORMIN (GLUCOPHAGE) 1000 MG tablet Take 0.5 tablets (500 mg total) by mouth daily with breakfast. 30 tablet 1    pravastatin (PRAVACHOL) 40 MG tablet Take 40 mg by mouth once daily.      predniSONE (DELTASONE) 20 MG tablet Take 20 mg by mouth.      tamsulosin (FLOMAX) 0.4 mg Cap Take 1 capsule (0.4 mg total) by mouth once daily. 10 capsule 0       Past Surgical History:   Procedure Laterality Date    SHOULDER SURGERY Right            Pre-op Assessment    I have reviewed the Patient Summary Reports.    I have reviewed the NPO Status.      Review of Systems  Anesthesia Hx:  No problems with previous Anesthesia   History of prior surgery of interest to airway management or planning:          Denies Family Hx of Anesthesia complications.    Denies Personal Hx of Anesthesia complications.                    Social:  Non-Smoker       Hematology/Oncology:  Hematology Normal   Oncology Normal                                   Cardiovascular:     Hypertension  Past MI CAD           hyperlipidemia       Coronary Artery Disease:                            Hypertension         Pulmonary:         Interstitial lung disease               Renal/:  Chronic Renal Disease renal calculi       Kidney Function/Disease             Hepatic/GI:  Bowel Prep.   GERD             Neurological:   CVA                       CVA - Cerebrovasular Accident                 Endocrine:  Diabetes, type 2    Diabetes                          Physical Exam  General: Well nourished, Cooperative, Alert and Oriented    Airway:  Mallampati: II   Mouth Opening: Normal  TM Distance: Normal  Tongue: Normal  Neck ROM: Normal  ROM    Dental:  Intact    Chest/Lungs:  Normal Respiratory Rate        Anesthesia Plan  Type of Anesthesia, risks & benefits discussed:    Anesthesia Type: Gen Natural Airway  Intra-op Monitoring Plan: Standard ASA Monitors  Induction:  IV  Informed Consent: Informed consent signed with the Patient and all parties understand the risks and agree with anesthesia plan.  All questions answered.   ASA Score: 3    Ready For Surgery From Anesthesia Perspective.     .

## 2024-03-27 NOTE — TRANSFER OF CARE
Anesthesia Transfer of Care Note    Patient: Emmanuel Grant    Procedure(s) Performed: Procedure(s) (LRB):  COLONOSCOPY (N/A)  EGD (ESOPHAGOGASTRODUODENOSCOPY) (N/A)    Patient location: GI    Anesthesia Type: general    Transport from OR: Transported from OR on room air with adequate spontaneous ventilation    Post pain: adequate analgesia    Post assessment: no apparent anesthetic complications and tolerated procedure well    Post vital signs: stable    Level of consciousness: sedated    Nausea/Vomiting: no nausea/vomiting    Complications: none    Transfer of care protocol was followed      Last vitals: Visit Vitals  /73 (BP Location: Left arm, Patient Position: Lying)   Pulse 77   Temp 36.7 °C (98.1 °F) (Oral)   Resp 14   SpO2 97%

## 2024-03-27 NOTE — PROVATION PATIENT INSTRUCTIONS
Discharge Summary/Instructions after an Endoscopic Procedure  Patient Name: Emmanuel Grant  Patient MRN: [id number]  Patient YOB: 1950  Wednesday, March 27, 2024  Ariela Castro MD  Dear patient,  As a result of recent federal legislation (The Federal Cures Act), you may   receive lab or pathology results from your procedure in your [facility]   account before your physician is able to contact you. Your physician or   their representative will relay the results to you with their   recommendations at their soonest availability.  Thank you,  RESTRICTIONS:  During your procedure today, you received medications for sedation.  These   medications may affect your judgment, balance and coordination.  Therefore,   for 24 hours, you have the following restrictions:   - DO NOT drive a car, operate machinery, make legal/financial decisions,   sign important papers or drink alcohol.    ACTIVITY:  Today: no heavy lifting, straining or running due to procedural   sedation/anesthesia.  The following day: return to full activity including work.  DIET:  Eat and drink normally unless instructed otherwise.     TREATMENT FOR COMMON SIDE EFFECTS:  - Mild abdominal pain, nausea, belching, bloating or excessive gas:  rest,   eat lightly and use a heating pad.  - Sore Throat: treat with throat lozenges and/or gargle with warm salt   water.  - Because air was used during the procedure, expelling large amounts of air   from your rectum or belching is normal.  - If a bowel prep was taken, you may not have a bowel movement for 1-3 days.    This is normal.  SYMPTOMS TO WATCH FOR AND REPORT TO YOUR PHYSICIAN:  1. Abdominal pain or bloating, other than gas cramps.  2. Chest pain.  3. Back pain.  4. Signs of infection such as: chills or fever occurring within 24 hours   after the procedure.  5. Rectal bleeding, which would show as bright red, maroon, or black stools.   (A tablespoon of blood from the rectum is not serious,  especially if   hemorrhoids are present.)  6. Vomiting.  7. Weakness or dizziness.  GO DIRECTLY TO THE NEAREST EMERGENCY ROOM IF YOU HAVE ANY OF THE FOLLOWING:      Difficulty breathing              Chills and/or fever over 101 F   Persistent vomiting and/or vomiting blood   Severe abdominal pain   Severe chest pain   Black, tarry stools   Bleeding- more than one tablespoon   Any other symptom or condition that you feel may need urgent attention  Your doctor recommends these additional instructions:  If any biopsies were taken, your doctors clinic will contact you in 1 to 2   weeks with any results.  - Discharge patient to home.   - Repeat colonoscopy in 5 years for surveillance.  For questions, problems or results please call your physician - Ariela Castro MD at Work:  [phone].  [facility] can be reached at [phone]     IF A COMPLICATION OR EMERGENCY SITUATION ARISES AND YOU ARE UNABLE TO REACH   YOUR PHYSICIAN - GO DIRECTLY TO THE EMERGENCY ROOM.  Ariela Castro MD  3/27/2024 11:50:32 AM  This report has been verified and signed electronically.  Dear patient,  As a result of recent federal legislation (The Federal Cures Act), you may   receive lab or pathology results from your procedure in your [facility]   account before your physician is able to contact you. Your physician or   their representative will relay the results to you with their   recommendations at their soonest availability.  Thank you,  PROVATION

## 2024-03-27 NOTE — PROVATION PATIENT INSTRUCTIONS
Discharge Summary/Instructions after an Endoscopic Procedure  Patient Name: Emmanuel Grant  Patient MRN: [id number]  Patient YOB: 1950  Wednesday, March 27, 2024  Ariela Castro MD  Dear patient,  As a result of recent federal legislation (The Federal Cures Act), you may   receive lab or pathology results from your procedure in your [facility]   account before your physician is able to contact you. Your physician or   their representative will relay the results to you with their   recommendations at their soonest availability.  Thank you,  RESTRICTIONS:  During your procedure today, you received medications for sedation.  These   medications may affect your judgment, balance and coordination.  Therefore,   for 24 hours, you have the following restrictions:   - DO NOT drive a car, operate machinery, make legal/financial decisions,   sign important papers or drink alcohol.    ACTIVITY:  Today: no heavy lifting, straining or running due to procedural   sedation/anesthesia.  The following day: return to full activity including work.  DIET:  Eat and drink normally unless instructed otherwise.     TREATMENT FOR COMMON SIDE EFFECTS:  - Mild abdominal pain, nausea, belching, bloating or excessive gas:  rest,   eat lightly and use a heating pad.  - Sore Throat: treat with throat lozenges and/or gargle with warm salt   water.  - Because air was used during the procedure, expelling large amounts of air   from your rectum or belching is normal.  - If a bowel prep was taken, you may not have a bowel movement for 1-3 days.    This is normal.  SYMPTOMS TO WATCH FOR AND REPORT TO YOUR PHYSICIAN:  1. Abdominal pain or bloating, other than gas cramps.  2. Chest pain.  3. Back pain.  4. Signs of infection such as: chills or fever occurring within 24 hours   after the procedure.  5. Rectal bleeding, which would show as bright red, maroon, or black stools.   (A tablespoon of blood from the rectum is not serious,  especially if   hemorrhoids are present.)  6. Vomiting.  7. Weakness or dizziness.  GO DIRECTLY TO THE NEAREST EMERGENCY ROOM IF YOU HAVE ANY OF THE FOLLOWING:      Difficulty breathing              Chills and/or fever over 101 F   Persistent vomiting and/or vomiting blood   Severe abdominal pain   Severe chest pain   Black, tarry stools   Bleeding- more than one tablespoon   Any other symptom or condition that you feel may need urgent attention  Your doctor recommends these additional instructions:  If any biopsies were taken, your doctors clinic will contact you in 1 to 2   weeks with any results.  - Discharge patient to home.   - Follow an antireflux regimen.  For questions, problems or results please call your physician - Ariela Castro MD at Work:  [phone].  [facility] can be reached at [phone]     IF A COMPLICATION OR EMERGENCY SITUATION ARISES AND YOU ARE UNABLE TO REACH   YOUR PHYSICIAN - GO DIRECTLY TO THE EMERGENCY ROOM.  Ariela Castro MD  3/27/2024 11:42:51 AM  This report has been verified and signed electronically.  Dear patient,  As a result of recent federal legislation (The Federal Cures Act), you may   receive lab or pathology results from your procedure in your [facility]   account before your physician is able to contact you. Your physician or   their representative will relay the results to you with their   recommendations at their soonest availability.  Thank you,  PROVATION

## 2024-03-27 NOTE — PLAN OF CARE
Procedure and recovery complete. Pt awake and alert. MD at bedside, procedure findings and suggestions discussed. Discharge instructions given, pt verbalized understanding of instruction. Gait steady, able to ambulate without assistance. Pt walked out to friends vehicle, accompanied by RN.

## 2024-04-02 ENCOUNTER — TELEPHONE (OUTPATIENT)
Dept: GASTROENTEROLOGY | Facility: CLINIC | Age: 74
End: 2024-04-02
Payer: MEDICARE

## 2024-04-02 LAB
FINAL PATHOLOGIC DIAGNOSIS: NORMAL
GROSS: NORMAL
Lab: NORMAL

## 2024-04-02 NOTE — TELEPHONE ENCOUNTER
----- Message from Ariela Castro MD sent at 4/2/2024  1:41 PM CDT -----  Pathology report is benign

## 2024-04-20 ENCOUNTER — HOSPITAL ENCOUNTER (OUTPATIENT)
Facility: HOSPITAL | Age: 74
Discharge: HOME | End: 2024-04-21
Attending: EMERGENCY MEDICINE | Admitting: EMERGENCY MEDICINE
Payer: MEDICARE

## 2024-04-20 DIAGNOSIS — E16.2 HYPOGLYCEMIA: ICD-10-CM

## 2024-04-20 DIAGNOSIS — R07.9 CHEST PAIN: ICD-10-CM

## 2024-04-20 DIAGNOSIS — E83.52 HYPERCALCEMIA: ICD-10-CM

## 2024-04-20 DIAGNOSIS — T88.7XXA SIDE EFFECT OF MEDICATION: Primary | ICD-10-CM

## 2024-04-20 PROBLEM — K21.9 GERD (GASTROESOPHAGEAL REFLUX DISEASE): Status: ACTIVE | Noted: 2024-04-20

## 2024-04-20 LAB
ALBUMIN SERPL BCP-MCNC: 3.7 G/DL (ref 3.5–5.2)
ALLENS TEST: NORMAL
ALP SERPL-CCNC: 62 U/L (ref 55–135)
ALT SERPL W/O P-5'-P-CCNC: 26 U/L (ref 10–44)
ANION GAP SERPL CALC-SCNC: 12 MMOL/L (ref 8–16)
AST SERPL-CCNC: 27 U/L (ref 10–40)
BACTERIA #/AREA URNS HPF: NORMAL /HPF
BASOPHILS # BLD AUTO: 0.02 K/UL (ref 0–0.2)
BASOPHILS NFR BLD: 0.2 % (ref 0–1.9)
BILIRUB SERPL-MCNC: 0.6 MG/DL (ref 0.1–1)
BILIRUB UR QL STRIP: NEGATIVE
BUN SERPL-MCNC: 16 MG/DL (ref 8–23)
CALCIUM SERPL-MCNC: 11.1 MG/DL (ref 8.7–10.5)
CHLORIDE SERPL-SCNC: 103 MMOL/L (ref 95–110)
CLARITY UR: CLEAR
CO2 SERPL-SCNC: 24 MMOL/L (ref 23–29)
COLOR UR: YELLOW
CREAT SERPL-MCNC: 1.1 MG/DL (ref 0.5–1.4)
DELSYS: NORMAL
DIFFERENTIAL METHOD BLD: ABNORMAL
EOSINOPHIL # BLD AUTO: 0.6 K/UL (ref 0–0.5)
EOSINOPHIL NFR BLD: 7.1 % (ref 0–8)
ERYTHROCYTE [DISTWIDTH] IN BLOOD BY AUTOMATED COUNT: 13.7 % (ref 11.5–14.5)
EST. GFR  (NO RACE VARIABLE): >60 ML/MIN/1.73 M^2
GLUCOSE SERPL-MCNC: 61 MG/DL (ref 70–110)
GLUCOSE UR QL STRIP: ABNORMAL
HCO3 UR-SCNC: 26.7 MMOL/L (ref 24–28)
HCT VFR BLD AUTO: 38.9 % (ref 40–54)
HGB BLD-MCNC: 13.4 G/DL (ref 14–18)
HGB UR QL STRIP: NEGATIVE
HYALINE CASTS #/AREA URNS LPF: 1 /LPF
IMM GRANULOCYTES # BLD AUTO: 0.01 K/UL (ref 0–0.04)
IMM GRANULOCYTES NFR BLD AUTO: 0.1 % (ref 0–0.5)
KETONES UR QL STRIP: NEGATIVE
LACTATE SERPL-SCNC: 2.9 MMOL/L (ref 0.5–2.2)
LEUKOCYTE ESTERASE UR QL STRIP: NEGATIVE
LIPASE SERPL-CCNC: 45 U/L (ref 4–60)
LYMPHOCYTES # BLD AUTO: 2.2 K/UL (ref 1–4.8)
LYMPHOCYTES NFR BLD: 25.7 % (ref 18–48)
MCH RBC QN AUTO: 28 PG (ref 27–31)
MCHC RBC AUTO-ENTMCNC: 34.4 G/DL (ref 32–36)
MCV RBC AUTO: 81 FL (ref 82–98)
MICROSCOPIC COMMENT: NORMAL
MONOCYTES # BLD AUTO: 0.8 K/UL (ref 0.3–1)
MONOCYTES NFR BLD: 9 % (ref 4–15)
NEUTROPHILS # BLD AUTO: 4.9 K/UL (ref 1.8–7.7)
NEUTROPHILS NFR BLD: 57.9 % (ref 38–73)
NITRITE UR QL STRIP: NEGATIVE
NRBC BLD-RTO: 0 /100 WBC
PCO2 BLDA: 42.1 MMHG (ref 35–45)
PH SMN: 7.41 [PH] (ref 7.35–7.45)
PH UR STRIP: 7 [PH] (ref 5–8)
PLATELET # BLD AUTO: 193 K/UL (ref 150–450)
PMV BLD AUTO: 10 FL (ref 9.2–12.9)
PO2 BLDA: 43 MMHG (ref 40–60)
POC BE: 2 MMOL/L
POC SATURATED O2: 79 % (ref 95–100)
POC TCO2: 28 MMOL/L (ref 24–29)
POCT GLUCOSE: 103 MG/DL (ref 70–110)
POCT GLUCOSE: 119 MG/DL (ref 70–110)
POCT GLUCOSE: 140 MG/DL (ref 70–110)
POCT GLUCOSE: 141 MG/DL (ref 70–110)
POCT GLUCOSE: 148 MG/DL (ref 70–110)
POCT GLUCOSE: 165 MG/DL (ref 70–110)
POCT GLUCOSE: 182 MG/DL (ref 70–110)
POCT GLUCOSE: 202 MG/DL (ref 70–110)
POCT GLUCOSE: 208 MG/DL (ref 70–110)
POCT GLUCOSE: 209 MG/DL (ref 70–110)
POCT GLUCOSE: 262 MG/DL (ref 70–110)
POCT GLUCOSE: 51 MG/DL (ref 70–110)
POCT GLUCOSE: 59 MG/DL (ref 70–110)
POTASSIUM SERPL-SCNC: 3.7 MMOL/L (ref 3.5–5.1)
PROT SERPL-MCNC: 7.9 G/DL (ref 6–8.4)
PROT UR QL STRIP: ABNORMAL
RBC # BLD AUTO: 4.79 M/UL (ref 4.6–6.2)
RBC #/AREA URNS HPF: 1 /HPF (ref 0–4)
SAMPLE: NORMAL
SITE: NORMAL
SODIUM SERPL-SCNC: 139 MMOL/L (ref 136–145)
SP GR UR STRIP: 1.03 (ref 1–1.03)
TROPONIN I SERPL DL<=0.01 NG/ML-MCNC: 0.01 NG/ML (ref 0–0.03)
URN SPEC COLLECT METH UR: ABNORMAL
UROBILINOGEN UR STRIP-ACNC: NEGATIVE EU/DL
WBC # BLD AUTO: 8.41 K/UL (ref 3.9–12.7)
WBC #/AREA URNS HPF: 0 /HPF (ref 0–5)

## 2024-04-20 PROCEDURE — 25500020 PHARM REV CODE 255: Performed by: EMERGENCY MEDICINE

## 2024-04-20 PROCEDURE — G0378 HOSPITAL OBSERVATION PER HR: HCPCS

## 2024-04-20 PROCEDURE — 80053 COMPREHEN METABOLIC PANEL: CPT | Performed by: NURSE PRACTITIONER

## 2024-04-20 PROCEDURE — 25000003 PHARM REV CODE 250: Performed by: EMERGENCY MEDICINE

## 2024-04-20 PROCEDURE — 82962 GLUCOSE BLOOD TEST: CPT

## 2024-04-20 PROCEDURE — 93010 ELECTROCARDIOGRAM REPORT: CPT | Mod: ,,, | Performed by: INTERNAL MEDICINE

## 2024-04-20 PROCEDURE — 83605 ASSAY OF LACTIC ACID: CPT | Performed by: EMERGENCY MEDICINE

## 2024-04-20 PROCEDURE — 96375 TX/PRO/DX INJ NEW DRUG ADDON: CPT

## 2024-04-20 PROCEDURE — 81000 URINALYSIS NONAUTO W/SCOPE: CPT | Performed by: NURSE PRACTITIONER

## 2024-04-20 PROCEDURE — 99900035 HC TECH TIME PER 15 MIN (STAT)

## 2024-04-20 PROCEDURE — 96365 THER/PROPH/DIAG IV INF INIT: CPT

## 2024-04-20 PROCEDURE — 83690 ASSAY OF LIPASE: CPT | Performed by: EMERGENCY MEDICINE

## 2024-04-20 PROCEDURE — 96366 THER/PROPH/DIAG IV INF ADDON: CPT

## 2024-04-20 PROCEDURE — 93005 ELECTROCARDIOGRAM TRACING: CPT

## 2024-04-20 PROCEDURE — 83970 ASSAY OF PARATHORMONE: CPT | Performed by: NURSE PRACTITIONER

## 2024-04-20 PROCEDURE — 82803 BLOOD GASES ANY COMBINATION: CPT

## 2024-04-20 PROCEDURE — 85025 COMPLETE CBC W/AUTO DIFF WBC: CPT | Performed by: NURSE PRACTITIONER

## 2024-04-20 PROCEDURE — 99285 EMERGENCY DEPT VISIT HI MDM: CPT | Mod: 25

## 2024-04-20 PROCEDURE — 63600175 PHARM REV CODE 636 W HCPCS: Mod: JB | Performed by: PHYSICIAN ASSISTANT

## 2024-04-20 PROCEDURE — 87040 BLOOD CULTURE FOR BACTERIA: CPT | Performed by: EMERGENCY MEDICINE

## 2024-04-20 PROCEDURE — 25000003 PHARM REV CODE 250: Performed by: PHYSICIAN ASSISTANT

## 2024-04-20 PROCEDURE — 96372 THER/PROPH/DIAG INJ SC/IM: CPT | Mod: 59 | Performed by: PHYSICIAN ASSISTANT

## 2024-04-20 PROCEDURE — 84484 ASSAY OF TROPONIN QUANT: CPT | Performed by: EMERGENCY MEDICINE

## 2024-04-20 RX ORDER — IBUPROFEN 200 MG
16 TABLET ORAL
Status: DISCONTINUED | OUTPATIENT
Start: 2024-04-20 | End: 2024-04-21 | Stop reason: HOSPADM

## 2024-04-20 RX ORDER — DEXTROSE 50 % IN WATER (D50W) INTRAVENOUS SYRINGE
25
Status: COMPLETED | OUTPATIENT
Start: 2024-04-20 | End: 2024-04-20

## 2024-04-20 RX ORDER — LATANOPROST 50 UG/ML
1 SOLUTION/ DROPS OPHTHALMIC NIGHTLY
COMMUNITY
Start: 2024-03-26

## 2024-04-20 RX ORDER — AMLODIPINE BESYLATE 5 MG/1
5 TABLET ORAL DAILY
Status: DISCONTINUED | OUTPATIENT
Start: 2024-04-21 | End: 2024-04-21 | Stop reason: HOSPADM

## 2024-04-20 RX ORDER — SUCRALFATE 1 G/1
1 TABLET ORAL 4 TIMES DAILY
COMMUNITY
Start: 2024-04-19

## 2024-04-20 RX ORDER — LANOLIN ALCOHOL/MO/W.PET/CERES
800 CREAM (GRAM) TOPICAL
Status: DISCONTINUED | OUTPATIENT
Start: 2024-04-20 | End: 2024-04-21 | Stop reason: HOSPADM

## 2024-04-20 RX ORDER — MONTELUKAST SODIUM 10 MG/1
10 TABLET ORAL NIGHTLY
COMMUNITY

## 2024-04-20 RX ORDER — SODIUM CHLORIDE 0.9 % (FLUSH) 0.9 %
10 SYRINGE (ML) INJECTION
Status: DISCONTINUED | OUTPATIENT
Start: 2024-04-20 | End: 2024-04-21 | Stop reason: HOSPADM

## 2024-04-20 RX ORDER — ATORVASTATIN CALCIUM 40 MG/1
40 TABLET, FILM COATED ORAL NIGHTLY
Status: DISCONTINUED | OUTPATIENT
Start: 2024-04-20 | End: 2024-04-21 | Stop reason: HOSPADM

## 2024-04-20 RX ORDER — DEXTROSE MONOHYDRATE 100 MG/ML
INJECTION, SOLUTION INTRAVENOUS CONTINUOUS
Status: ACTIVE | OUTPATIENT
Start: 2024-04-20 | End: 2024-04-20

## 2024-04-20 RX ORDER — IBUPROFEN 200 MG
24 TABLET ORAL
Status: DISCONTINUED | OUTPATIENT
Start: 2024-04-20 | End: 2024-04-21 | Stop reason: HOSPADM

## 2024-04-20 RX ORDER — NAPROXEN SODIUM 220 MG/1
81 TABLET, FILM COATED ORAL DAILY
Status: DISCONTINUED | OUTPATIENT
Start: 2024-04-21 | End: 2024-04-21 | Stop reason: HOSPADM

## 2024-04-20 RX ORDER — GLUCAGON 1 MG
1 KIT INJECTION
Status: DISCONTINUED | OUTPATIENT
Start: 2024-04-20 | End: 2024-04-21 | Stop reason: HOSPADM

## 2024-04-20 RX ORDER — FAMOTIDINE 10 MG/ML
20 INJECTION INTRAVENOUS 2 TIMES DAILY
Status: DISCONTINUED | OUTPATIENT
Start: 2024-04-20 | End: 2024-04-21 | Stop reason: HOSPADM

## 2024-04-20 RX ORDER — FAMOTIDINE 10 MG/ML
20 INJECTION INTRAVENOUS 2 TIMES DAILY
Status: DISCONTINUED | OUTPATIENT
Start: 2024-04-20 | End: 2024-04-20

## 2024-04-20 RX ORDER — NALOXONE HCL 0.4 MG/ML
0.02 VIAL (ML) INJECTION
Status: DISCONTINUED | OUTPATIENT
Start: 2024-04-20 | End: 2024-04-21 | Stop reason: HOSPADM

## 2024-04-20 RX ORDER — GLIMEPIRIDE 2 MG/1
2 TABLET ORAL 2 TIMES DAILY
Status: ON HOLD | COMMUNITY
Start: 2024-03-29 | End: 2024-04-21 | Stop reason: HOSPADM

## 2024-04-20 RX ORDER — OCTREOTIDE ACETATE 50 UG/ML
50 INJECTION, SOLUTION INTRAVENOUS; SUBCUTANEOUS ONCE
Status: COMPLETED | OUTPATIENT
Start: 2024-04-20 | End: 2024-04-20

## 2024-04-20 RX ORDER — SODIUM CHLORIDE 0.9 % (FLUSH) 0.9 %
10 SYRINGE (ML) INJECTION EVERY 8 HOURS
Status: DISCONTINUED | OUTPATIENT
Start: 2024-04-20 | End: 2024-04-20

## 2024-04-20 RX ORDER — CARVEDILOL 12.5 MG/1
25 TABLET ORAL 2 TIMES DAILY WITH MEALS
Status: DISCONTINUED | OUTPATIENT
Start: 2024-04-20 | End: 2024-04-21 | Stop reason: HOSPADM

## 2024-04-20 RX ORDER — AMOXICILLIN 250 MG
1 CAPSULE ORAL DAILY PRN
Status: DISCONTINUED | OUTPATIENT
Start: 2024-04-20 | End: 2024-04-21 | Stop reason: HOSPADM

## 2024-04-20 RX ORDER — AMLODIPINE BESYLATE 5 MG/1
5 TABLET ORAL DAILY
Status: ON HOLD | COMMUNITY
Start: 2024-03-29 | End: 2024-06-17 | Stop reason: HOSPADM

## 2024-04-20 RX ORDER — ACETAMINOPHEN 325 MG/1
650 TABLET ORAL EVERY 4 HOURS PRN
Status: DISCONTINUED | OUTPATIENT
Start: 2024-04-20 | End: 2024-04-21 | Stop reason: HOSPADM

## 2024-04-20 RX ORDER — TAMSULOSIN HYDROCHLORIDE 0.4 MG/1
0.4 CAPSULE ORAL DAILY
Status: DISCONTINUED | OUTPATIENT
Start: 2024-04-21 | End: 2024-04-21 | Stop reason: HOSPADM

## 2024-04-20 RX ORDER — TALC
6 POWDER (GRAM) TOPICAL NIGHTLY PRN
Status: DISCONTINUED | OUTPATIENT
Start: 2024-04-20 | End: 2024-04-21 | Stop reason: HOSPADM

## 2024-04-20 RX ADMIN — DEXTROSE MONOHYDRATE: 100 INJECTION, SOLUTION INTRAVENOUS at 02:04

## 2024-04-20 RX ADMIN — OCTREOTIDE ACETATE 50 MCG: 50 INJECTION, SOLUTION INTRAVENOUS; SUBCUTANEOUS at 04:04

## 2024-04-20 RX ADMIN — ATORVASTATIN CALCIUM 40 MG: 10 TABLET, FILM COATED ORAL at 08:04

## 2024-04-20 RX ADMIN — IOHEXOL 80 ML: 350 INJECTION, SOLUTION INTRAVENOUS at 01:04

## 2024-04-20 RX ADMIN — FAMOTIDINE 20 MG: 10 INJECTION INTRAVENOUS at 04:04

## 2024-04-20 RX ADMIN — Medication 6 MG: at 08:04

## 2024-04-20 RX ADMIN — CARVEDILOL 25 MG: 12.5 TABLET, FILM COATED ORAL at 04:04

## 2024-04-20 RX ADMIN — Medication 25 G: at 12:04

## 2024-04-20 NOTE — SUBJECTIVE & OBJECTIVE
"Past Medical History:   Diagnosis Date    Diabetes mellitus     Hypertension     Kidney stone     Stroke        Past Surgical History:   Procedure Laterality Date    COLONOSCOPY N/A 3/27/2024    Procedure: COLONOSCOPY;  Surgeon: Ariela Castro MD;  Location: [facility];  Service: Endoscopy;  Laterality: N/A;    ESOPHAGOGASTRODUODENOSCOPY N/A 3/27/2024    Procedure: EGD (ESOPHAGOGASTRODUODENOSCOPY);  Surgeon: Ariela Castro MD;  Location: [facility] END[facility];  Service: Endoscopy;  Laterality: N/A;    SHOULDER SURGERY Right        Review of patient's allergies indicates:   Allergen Reactions    Dapagliflozin      Other reaction(s): Other (See Comments)    Pcn [penicillins]     Linagliptin Other (See Comments)     "it knocked me down", "it almost killed me"    Lisinopril Other (See Comments)     cough    Pantoprazole Hives       Current Facility-Administered Medications   Medication Dose Route Frequency Provider Last Rate Last Admin    acetaminophen tablet 650 mg  650 mg Oral Q4H PRN Ousmane Alcantar PA-C        dextrose 10 % infusion   Intravenous Continuous Ivette Reid  mL/hr at 04/20/24 1438 New Bag at 04/20/24 1438    famotidine (PF) injection 20 mg  20 mg Intravenous BID Ousmane Alcantar PA-C        glucagon (human recombinant) injection 1 mg  1 mg Intramuscular PRN Ousmane Alcantar PA-C        glucose chewable tablet 16 g  16 g Oral PRN Ousmane Alcantar PA-C        glucose chewable tablet 24 g  24 g Oral PRN Ousmane Alcantar PA-C        magnesium oxide tablet 800 mg  800 mg Oral PRN Ousmane Alcantar PA-C        magnesium oxide tablet 800 mg  800 mg Oral PRN Ousmane Alcantar PA-C        melatonin tablet 6 mg  6 mg Oral Nightly PRN ShowOusmane velasquez PA-C        naloxone 0.4 mg/mL injection 0.02 mg  0.02 mg Intravenous PRN Ousmane Alcantar PA-C        octreotide injection 50 mcg  50 mcg Subcutaneous Once Ousmane Alcantar PA-C        senna-docusate 8.6-50 mg per tablet 1 tablet  1 tablet Oral Daily PRN " Ousmane Alcantar PA-C        sodium chloride 0.9% flush 10 mL  10 mL Intravenous PRN Ousmane Alcantar PA-C         Current Outpatient Medications   Medication Sig Dispense Refill    acetaminophen (TYLENOL) 500 MG tablet Take 1 tablet (500 mg total) by mouth every 4 (four) hours as needed for Pain or Temperature greater than (100.5 or greater). 30 tablet 0    albuterol (PROVENTIL/VENTOLIN HFA) 90 mcg/actuation inhaler Inhale 2 puffs into the lungs every 6 (six) hours as needed for Wheezing or Shortness of Breath. 18 g 0    amlodipine (NORVASC) 10 MG tablet Take 5 mg by mouth once daily.      aspirin 81 MG Chew Take 81 mg by mouth once daily.      atorvastatin (LIPITOR) 40 MG tablet Take 40 mg by mouth every evening.      carvedilol (COREG) 25 MG tablet Take 25 mg by mouth 2 (two) times daily with meals.      cholecalciferol, vitamin D3, (VITAMIN D3) 50 mcg (2,000 unit) Cap capsule 1 capsule.      esomeprazole (NEXIUM) 40 MG capsule Take 1 capsule (40 mg total) by mouth 2 (two) times daily. 60 capsule 11    fluticasone propionate (FLONASE) 50 mcg/actuation nasal spray 1 spray (50 mcg total) by Each Nostril route once daily. 15 g 0    glipiZIDE (GLUCOTROL) 10 MG tablet Take 10 mg by mouth 2 (two) times daily before meals.      loratadine (CLARITIN) 10 mg tablet Take 1 tablet (10 mg total) by mouth every morning. 60 tablet 0    magnesium citrate solution Take half the bottle for constipation as needed. 148 mL 0    metFORMIN (GLUCOPHAGE) 1000 MG tablet Take 0.5 tablets (500 mg total) by mouth daily with breakfast. 30 tablet 1    pravastatin (PRAVACHOL) 40 MG tablet Take 40 mg by mouth once daily.      predniSONE (DELTASONE) 20 MG tablet Take 20 mg by mouth.      tamsulosin (FLOMAX) 0.4 mg Cap Take 1 capsule (0.4 mg total) by mouth once daily. 10 capsule 0     Family History       Problem Relation (Age of Onset)    Colon cancer Sister          Tobacco Use    Smoking status: Never     Passive exposure: Never    Smokeless  tobacco: Never   Substance and Sexual Activity    Alcohol use: No    Drug use: Never    Sexual activity: Not Currently     Review of Systems   Constitutional:  Positive for diaphoresis and fatigue. Negative for chills and fever.   HENT:  Negative for nosebleeds and tinnitus.    Eyes:  Negative for photophobia and visual disturbance.   Respiratory:  Negative for shortness of breath and wheezing.    Cardiovascular:  Negative for chest pain, palpitations and leg swelling.   Gastrointestinal:  Negative for abdominal distention, nausea and vomiting.   Genitourinary:  Negative for dysuria, flank pain and hematuria.   Musculoskeletal:  Negative for gait problem and joint swelling.   Skin:  Negative for rash and wound.   Neurological:  Positive for weakness. Negative for seizures and syncope.     Objective:     Vital Signs (Most Recent):  Temp: 98.4 °F (36.9 °C) (04/20/24 1125)  Pulse: 85 (04/20/24 1530)  Resp: 20 (04/20/24 1145)  BP: (!) 164/90 (04/20/24 1530)  SpO2: 95 % (04/20/24 1530) Vital Signs (24h Range):  Temp:  [98.4 °F (36.9 °C)] 98.4 °F (36.9 °C)  Pulse:  [79-94] 85  Resp:  [18-20] 20  SpO2:  [94 %-97 %] 95 %  BP: (130-170)/(83-94) 164/90     Weight: 64.9 kg (143 lb)  Body mass index is 21.74 kg/m².     Physical Exam  Vitals and nursing note reviewed.   Constitutional:       General: He is not in acute distress.     Appearance: He is well-developed. He is not diaphoretic.   HENT:      Head: Normocephalic and atraumatic.      Right Ear: External ear normal.      Left Ear: External ear normal.   Eyes:      General:         Right eye: No discharge.         Left eye: No discharge.      Conjunctiva/sclera: Conjunctivae normal.   Neck:      Thyroid: No thyromegaly.   Cardiovascular:      Rate and Rhythm: Normal rate and regular rhythm.      Heart sounds: No murmur heard.  Pulmonary:      Effort: Pulmonary effort is normal. No respiratory distress.      Breath sounds: Normal breath sounds.   Abdominal:      General:  Bowel sounds are normal. There is no distension.      Palpations: Abdomen is soft. There is no mass.      Tenderness: There is no abdominal tenderness.   Musculoskeletal:         General: No deformity.      Cervical back: Normal range of motion and neck supple.      Right lower leg: No edema.      Left lower leg: No edema.   Skin:     General: Skin is warm and dry.   Neurological:      Mental Status: He is alert and oriented to person, place, and time.      Sensory: No sensory deficit.   Psychiatric:         Mood and Affect: Mood normal.         Behavior: Behavior normal.                Significant Labs: CBC:   Recent Labs   Lab 04/20/24  1143   WBC 8.41   HGB 13.4*   HCT 38.9*        CMP:   Recent Labs   Lab 04/20/24  1143      K 3.7      CO2 24   GLU 61*   BUN 16   CREATININE 1.1   CALCIUM 11.1*   PROT 7.9   ALBUMIN 3.7   BILITOT 0.6   ALKPHOS 62   AST 27   ALT 26   ANIONGAP 12     Lactic Acid:   Recent Labs   Lab 04/20/24  1215   LACTATE 2.9*     Troponin:   Recent Labs   Lab 04/20/24  1215   TROPONINI 0.012     Urine Studies:   Recent Labs   Lab 04/20/24  1416   COLORU Yellow   APPEARANCEUA Clear   PHUR 7.0   SPECGRAV 1.030   PROTEINUA 1+*   GLUCUA 1+*   KETONESU Negative   BILIRUBINUA Negative   OCCULTUA Negative   NITRITE Negative   UROBILINOGEN Negative   LEUKOCYTESUR Negative   RBCUA 1   WBCUA 0   BACTERIA None   HYALINECASTS 1       Significant Imaging:   Imaging Results              X-Ray Chest AP Portable (Final result)  Result time 04/20/24 14:22:37      Final result by Yasmine Acosta MD (04/20/24 14:22:37)                   Impression:      Chronic appearing lung findings, no definite acute process seen.      Electronically signed by: Yasmine Acosta MD  Date:    04/20/2024  Time:    14:22               Narrative:    EXAMINATION:  XR CHEST AP PORTABLE    CLINICAL HISTORY:  Hypocalcemia    TECHNIQUE:  Single frontal view of the chest was  performed.    COMPARISON:  02/28/2024    FINDINGS:  The cardiac silhouette is midline, normal in size.  Low lung volume.  Coarse increased interstitial lung markings, diffusely seen, both lung fields, unchanged suggestive of underlying interstitial lung disease.  No superimposed acute focal area of airspace consolidation.  No pleural effusion.  No pneumothorax.    The osseous structures appear normal.                                       CT Head Without Contrast (Final result)  Result time 04/20/24 13:24:03      Final result by Edgard Ann MD (04/20/24 13:24:03)                   Impression:      No acute intracranial process.  Chronic ischemic changes.      Electronically signed by: Edgard Ann  Date:    04/20/2024  Time:    13:24               Narrative:    EXAMINATION:  CT HEAD WITHOUT CONTRAST    CLINICAL HISTORY:  Headache, new or worsening (Age >= 50y);    TECHNIQUE:  Low dose axial images were obtained through the head.  Coronal and sagittal reformations were also performed. Contrast was not administered.    COMPARISON:  None.    FINDINGS:  No evidence of hydrocephalus, mass effect, intracranial hemorrhage or acute territorial infarct.    Chronic lacunar infarcts bilateral basal ganglia.  Decreased attenuation in the periventricular white matter is nonspecific but may reflect mild to moderate chronic small vessel ischemic change.    Prominent atherosclerotic vascular calcifications are noted at the skull base.    The calvarium is intact.  Nonspecific small occipital calvarial defect may potentially an represent an arachnoid granulation.  The visualized sinuses and mastoid air cells are essentially clear with only minimal left maxillary mucosal thickening.                                       CT Abdomen Pelvis With IV Contrast NO Oral Contrast (Final result)  Result time 04/20/24 13:31:19      Final result by Marc Rascon Jr., MD (04/20/24 13:31:19)                   Impression:      No  acute intra-abdominal process    Pulmonary fibrosis    Right renal cortical scarring and nonobstructing nephrolith versus dystrophic calcification    Atherosclerosis      Electronically signed by: Marc Reed Jr  Date:    04/20/2024  Time:    13:31               Narrative:    EXAMINATION:  CT ABDOMEN PELVIS WITH IV CONTRAST    CLINICAL HISTORY:  Epigastric pain;    TECHNIQUE:  Low dose axial images, sagittal and coronal reformations were obtained from the lung bases to the pubic symphysis following the IV administration of 80 mL of Omnipaque 350    COMPARISON:  None.    FINDINGS:  Abdomen:    - Lung bases: Fibrotic changes including bronchiectasis, reticulation and mosaic attenuation compatible with pulmonary fibrosis.    - Liver: Normal.    - Gallbladder and bile ducts: Unremarkable.    - Spleen: Unremarkable.    - Pancreas: Normal.    - Kidneys: Right lower pole renal cortical scarring and 7 mm dystrophic calcification versus nonobstructing nephrolith lower pole.  Bilateral renal cysts.    - Adrenals: Unremarkable.    - Retroperitoneum:  No significant adenopathy.    - Vascular: Aortic atherosclerosis is present.    - Bowel/mesentery: Unremarkable.    Pelvis:    No pelvic mass, adenopathy, or free fluid.    Bones:  Unremarkable.

## 2024-04-20 NOTE — ADMISSIONCARE
AdmissionCare    Guideline: Diabetes - OBS, Observation    Based on the indications selected for the patient, the bed status of Observation was determined to be MET    The following indications were selected as present at the time of evaluation of the patient:      - Hypoglycemia (eg, plasma glucose less than 70 mg/dL (3.89 mmol/L)) and 1 or more of the following:    - Initial emergency department treatment (ie, 3 to 4 hours) has not resulted in recovery.    AdmissionCare documentation entered by: Lynn Storey    [facility], 27th edition, Copyright © 2023 [facility] [employer], [facility] All Rights Reserved.  [id number]

## 2024-04-20 NOTE — PHARMACY MED REC
"Admission Medication History     The home medication history was taken by Wilda Miner.    You may go to "Admission" then "Reconcile Home Medications" tabs to review and/or act upon these items.     The home medication list has been updated by the Pharmacy department.   Please read ALL comments highlighted in yellow.   Please address this information as you see fit.    Feel free to contact us if you have any questions or require assistance.      The medications listed below were removed from the home medication list. Please reorder if appropriate:  Patient reports no longer taking the following medication(s):  Tylenol  Albuterol inhaler  Flonase  Loratadine    Medications listed below were obtained from: Patient/family, Medications brought from home, and Analytic software- [employer] and added to home medication:     Beano   Montelukast   Dry eye relief      The medication reconciliation was completed by the patient's bedside.      Wilda Miner  [phone]                  .          "

## 2024-04-20 NOTE — HPI
Emmanuel Grant 73 y.o. male with CAD, DM, BPH, HTN, presents to the hospital with a chief complaint of weakness.  He reports 2 days of intermittent weakness fatigue with associated diaphoresis.  He found his symptoms were more severe this morning causing presentation in the emergency room.  He reports they feel improved with treatment in the ER.  He states he last took his sulfonylurea this morning prior to arrival hospital.  He denies fever chest pain nausea vomiting abdominal leg swelling melena hematuria hematemesis dizziness syncope.  He has been maintaining normal p.o. intake outpatient and ate a full dinner last night.    In the ED, her currently hypoglycemic as low as 51 afebrile without leukocytosis initial lactic acid 2.9 troponin negative calcium 11.1 chest x-ray with chronic appearing lung findings no definite acute process seen CT abdomen pelvis without acute intra-abdominal process though pulmonary fibrosis, CT head without acute intracranial process.

## 2024-04-20 NOTE — ED TRIAGE NOTES
"Pt presents to ED with complaint of "not feeling good" for the last few months. Pt states he been feeling sick on and off for 6 months. Pt complains of a generalized headache 8/10, productive cough accompanied by sob x1 week. Pt stat at 97% on RA. Pt denies chest pain or n/v/d.   "

## 2024-04-20 NOTE — ASSESSMENT & PLAN NOTE
Patient's FSGs are uncontrolled due to hypoglycemia on current medication regimen.  Last A1c reviewed-   Lab Results   Component Value Date    HGBA1C 7.0 (H) 02/18/2024     Most recent fingerstick glucose reviewed-   Recent Labs   Lab 04/20/24  1354 04/20/24  1424 04/20/24  1455 04/20/24  1510   POCTGLUCOSE 103 119* 140* 148*     Current correctional scale   held as above   anti-hyperglycemic dose as follows-   Antihyperglycemics (From admission, onward)      None          Hold Oral hypoglycemics while patient is in the hospital.

## 2024-04-20 NOTE — ASSESSMENT & PLAN NOTE
Presents with recurrently hypoglycemia to 51. Last A1c 7.0. reports normal PO intake outpt and denies infectious symptoms. As last A1c was 7.0, possibly over medicated with metformin and sulfonylurea  Will continue on D10, give dose of octreotide given recurrent hypoglycemia  Start diet  Check A1c  Q4 accu check

## 2024-04-20 NOTE — ASSESSMENT & PLAN NOTE
Calcium today of 11.1. suspect symptoms more related to hypoglycemia rather than calcium  Will check PTH and repeat CMP

## 2024-04-20 NOTE — H&P
[facility]  [facility] Medicine  History & Physical    Patient Name: Emmanuel Grant  MRN: [id number]  Patient Class: OP- Observation  Admission Date: 4/20/2024  Attending Physician: Macario Montgomery MD   Primary Care Provider: Payam Vu Jr., NP         Patient information was obtained from patient, past medical records, and ER records.     Subjective:     Principal Problem:Hypoglycemia    Chief Complaint:   Chief Complaint   Patient presents with    Headache     Pt c/o feeling sick on and off x 6 months. Pt reporting weight loss, vomiting, abdominal pain, and headache. Pt appears weak. Pt says he's been going back and forth to his primary with no relief. Pt has a hx of HTN and DM. Pt diagnosed with pneumonia x 1 week on antiobiotics.         HPI: Emmanuel Grnat 73 y.o. male with CAD, DM, BPH, HTN, presents to the hospital with a chief complaint of weakness.  He reports 2 days of intermittent weakness fatigue with associated diaphoresis.  He found his symptoms were more severe this morning causing presentation in the emergency room.  He reports they feel improved with treatment in the ER.  He states he last took his sulfonylurea this morning prior to arrival hospital.  He denies fever chest pain nausea vomiting abdominal leg swelling melena hematuria hematemesis dizziness syncope.  He has been maintaining normal p.o. intake outpatient and ate a full dinner last night.    In the ED, her currently hypoglycemic as low as 51 afebrile without leukocytosis initial lactic acid 2.9 troponin negative calcium 11.1 chest x-ray with chronic appearing lung findings no definite acute process seen CT abdomen pelvis without acute intra-abdominal process though pulmonary fibrosis, CT head without acute intracranial process.    Past Medical History:   Diagnosis Date    Diabetes mellitus     Hypertension     Kidney stone     Stroke        Past Surgical History:   Procedure Laterality Date    COLONOSCOPY N/A 3/27/2024  "   Procedure: COLONOSCOPY;  Surgeon: Ariela Castro MD;  Location: [facility] ENDO;  Service: Endoscopy;  Laterality: N/A;    ESOPHAGOGASTRODUODENOSCOPY N/A 3/27/2024    Procedure: EGD (ESOPHAGOGASTRODUODENOSCOPY);  Surgeon: Ariela Castro MD;  Location: [facility] ENDO;  Service: Endoscopy;  Laterality: N/A;    SHOULDER SURGERY Right        Review of patient's allergies indicates:   Allergen Reactions    Dapagliflozin      Other reaction(s): Other (See Comments)    Pcn [penicillins]     Linagliptin Other (See Comments)     "it knocked me down", "it almost killed me"    Lisinopril Other (See Comments)     cough    Pantoprazole Hives       Current Facility-Administered Medications   Medication Dose Route Frequency Provider Last Rate Last Admin    acetaminophen tablet 650 mg  650 mg Oral Q4H PRN ShowOusmane velasquez PA-C        dextrose 10 % infusion   Intravenous Continuous Ivette Reid  mL/hr at 04/20/24 1438 New Bag at 04/20/24 1438    famotidine (PF) injection 20 mg  20 mg Intravenous BID Ousmane Alcantar PA-C        glucagon (human recombinant) injection 1 mg  1 mg Intramuscular PRN ShowOusmane velasquez PA-C        glucose chewable tablet 16 g  16 g Oral PRN Ousmane Alcantar PA-C        glucose chewable tablet 24 g  24 g Oral PRN Ousmane Alcantar PA-C        magnesium oxide tablet 800 mg  800 mg Oral PRN ShowOusmane velasquez PA-C        magnesium oxide tablet 800 mg  800 mg Oral PRN Ousmane Alcantar PA-C        melatonin tablet 6 mg  6 mg Oral Nightly PRN Ousmane Alcantar PA-C        naloxone 0.4 mg/mL injection 0.02 mg  0.02 mg Intravenous PRN ShowOusmane velasquez PA-C        octreotide injection 50 mcg  50 mcg Subcutaneous Once ShowOusmane velasquez PA-C        senna-docusate 8.6-50 mg per tablet 1 tablet  1 tablet Oral Daily PRN Ousmane Alcantar PA-C        sodium chloride 0.9% flush 10 mL  10 mL Intravenous PRN ShowOusmane velasquez PA-C         Current Outpatient Medications   Medication Sig Dispense Refill    " acetaminophen (TYLENOL) 500 MG tablet Take 1 tablet (500 mg total) by mouth every 4 (four) hours as needed for Pain or Temperature greater than (100.5 or greater). 30 tablet 0    albuterol (PROVENTIL/VENTOLIN HFA) 90 mcg/actuation inhaler Inhale 2 puffs into the lungs every 6 (six) hours as needed for Wheezing or Shortness of Breath. 18 g 0    amlodipine (NORVASC) 10 MG tablet Take 5 mg by mouth once daily.      aspirin 81 MG Chew Take 81 mg by mouth once daily.      atorvastatin (LIPITOR) 40 MG tablet Take 40 mg by mouth every evening.      carvedilol (COREG) 25 MG tablet Take 25 mg by mouth 2 (two) times daily with meals.      cholecalciferol, vitamin D3, (VITAMIN D3) 50 mcg (2,000 unit) Cap capsule 1 capsule.      esomeprazole (NEXIUM) 40 MG capsule Take 1 capsule (40 mg total) by mouth 2 (two) times daily. 60 capsule 11    fluticasone propionate (FLONASE) 50 mcg/actuation nasal spray 1 spray (50 mcg total) by Each Nostril route once daily. 15 g 0    glipiZIDE (GLUCOTROL) 10 MG tablet Take 10 mg by mouth 2 (two) times daily before meals.      loratadine (CLARITIN) 10 mg tablet Take 1 tablet (10 mg total) by mouth every morning. 60 tablet 0    magnesium citrate solution Take half the bottle for constipation as needed. 148 mL 0    metFORMIN (GLUCOPHAGE) 1000 MG tablet Take 0.5 tablets (500 mg total) by mouth daily with breakfast. 30 tablet 1    pravastatin (PRAVACHOL) 40 MG tablet Take 40 mg by mouth once daily.      predniSONE (DELTASONE) 20 MG tablet Take 20 mg by mouth.      tamsulosin (FLOMAX) 0.4 mg Cap Take 1 capsule (0.4 mg total) by mouth once daily. 10 capsule 0     Family History       Problem Relation (Age of Onset)    Colon cancer Sister          Tobacco Use    Smoking status: Never     Passive exposure: Never    Smokeless tobacco: Never   Substance and Sexual Activity    Alcohol use: No    Drug use: Never    Sexual activity: Not Currently     Review of Systems   Constitutional:  Positive for  diaphoresis and fatigue. Negative for chills and fever.   HENT:  Negative for nosebleeds and tinnitus.    Eyes:  Negative for photophobia and visual disturbance.   Respiratory:  Negative for shortness of breath and wheezing.    Cardiovascular:  Negative for chest pain, palpitations and leg swelling.   Gastrointestinal:  Negative for abdominal distention, nausea and vomiting.   Genitourinary:  Negative for dysuria, flank pain and hematuria.   Musculoskeletal:  Negative for gait problem and joint swelling.   Skin:  Negative for rash and wound.   Neurological:  Positive for weakness. Negative for seizures and syncope.     Objective:     Vital Signs (Most Recent):  Temp: 98.4 °F (36.9 °C) (04/20/24 1125)  Pulse: 85 (04/20/24 1530)  Resp: 20 (04/20/24 1145)  BP: (!) 164/90 (04/20/24 1530)  SpO2: 95 % (04/20/24 1530) Vital Signs (24h Range):  Temp:  [98.4 °F (36.9 °C)] 98.4 °F (36.9 °C)  Pulse:  [79-94] 85  Resp:  [18-20] 20  SpO2:  [94 %-97 %] 95 %  BP: (130-170)/(83-94) 164/90     Weight: 64.9 kg (143 lb)  Body mass index is 21.74 kg/m².     Physical Exam  Vitals and nursing note reviewed.   Constitutional:       General: He is not in acute distress.     Appearance: He is well-developed. He is not diaphoretic.   HENT:      Head: Normocephalic and atraumatic.      Right Ear: External ear normal.      Left Ear: External ear normal.   Eyes:      General:         Right eye: No discharge.         Left eye: No discharge.      Conjunctiva/sclera: Conjunctivae normal.   Neck:      Thyroid: No thyromegaly.   Cardiovascular:      Rate and Rhythm: Normal rate and regular rhythm.      Heart sounds: No murmur heard.  Pulmonary:      Effort: Pulmonary effort is normal. No respiratory distress.      Breath sounds: Normal breath sounds.   Abdominal:      General: Bowel sounds are normal. There is no distension.      Palpations: Abdomen is soft. There is no mass.      Tenderness: There is no abdominal tenderness.   Musculoskeletal:          General: No deformity.      Cervical back: Normal range of motion and neck supple.      Right lower leg: No edema.      Left lower leg: No edema.   Skin:     General: Skin is warm and dry.   Neurological:      Mental Status: He is alert and oriented to person, place, and time.      Sensory: No sensory deficit.   Psychiatric:         Mood and Affect: Mood normal.         Behavior: Behavior normal.                Significant Labs: CBC:   Recent Labs   Lab 04/20/24  1143   WBC 8.41   HGB 13.4*   HCT 38.9*        CMP:   Recent Labs   Lab 04/20/24  1143      K 3.7      CO2 24   GLU 61*   BUN 16   CREATININE 1.1   CALCIUM 11.1*   PROT 7.9   ALBUMIN 3.7   BILITOT 0.6   ALKPHOS 62   AST 27   ALT 26   ANIONGAP 12     Lactic Acid:   Recent Labs   Lab 04/20/24  1215   LACTATE 2.9*     Troponin:   Recent Labs   Lab 04/20/24  1215   TROPONINI 0.012     Urine Studies:   Recent Labs   Lab 04/20/24  1416   COLORU Yellow   APPEARANCEUA Clear   PHUR 7.0   SPECGRAV 1.030   PROTEINUA 1+*   GLUCUA 1+*   KETONESU Negative   BILIRUBINUA Negative   OCCULTUA Negative   NITRITE Negative   UROBILINOGEN Negative   LEUKOCYTESUR Negative   RBCUA 1   WBCUA 0   BACTERIA None   HYALINECASTS 1       Significant Imaging:   Imaging Results              X-Ray Chest AP Portable (Final result)  Result time 04/20/24 14:22:37      Final result by Yasmine Acosta MD (04/20/24 14:22:37)                   Impression:      Chronic appearing lung findings, no definite acute process seen.      Electronically signed by: Yasmine Acosta MD  Date:    04/20/2024  Time:    14:22               Narrative:    EXAMINATION:  XR CHEST AP PORTABLE    CLINICAL HISTORY:  Hypocalcemia    TECHNIQUE:  Single frontal view of the chest was performed.    COMPARISON:  02/28/2024    FINDINGS:  The cardiac silhouette is midline, normal in size.  Low lung volume.  Coarse increased interstitial lung markings, diffusely seen, both lung fields, unchanged  suggestive of underlying interstitial lung disease.  No superimposed acute focal area of airspace consolidation.  No pleural effusion.  No pneumothorax.    The osseous structures appear normal.                                       CT Head Without Contrast (Final result)  Result time 04/20/24 13:24:03      Final result by Edgard Ann MD (04/20/24 13:24:03)                   Impression:      No acute intracranial process.  Chronic ischemic changes.      Electronically signed by: Edgard Ann  Date:    04/20/2024  Time:    13:24               Narrative:    EXAMINATION:  CT HEAD WITHOUT CONTRAST    CLINICAL HISTORY:  Headache, new or worsening (Age >= 50y);    TECHNIQUE:  Low dose axial images were obtained through the head.  Coronal and sagittal reformations were also performed. Contrast was not administered.    COMPARISON:  None.    FINDINGS:  No evidence of hydrocephalus, mass effect, intracranial hemorrhage or acute territorial infarct.    Chronic lacunar infarcts bilateral basal ganglia.  Decreased attenuation in the periventricular white matter is nonspecific but may reflect mild to moderate chronic small vessel ischemic change.    Prominent atherosclerotic vascular calcifications are noted at the skull base.    The calvarium is intact.  Nonspecific small occipital calvarial defect may potentially an represent an arachnoid granulation.  The visualized sinuses and mastoid air cells are essentially clear with only minimal left maxillary mucosal thickening.                                       CT Abdomen Pelvis With IV Contrast NO Oral Contrast (Final result)  Result time 04/20/24 13:31:19      Final result by Marc Rascon Jr., MD (04/20/24 13:31:19)                   Impression:      No acute intra-abdominal process    Pulmonary fibrosis    Right renal cortical scarring and nonobstructing nephrolith versus dystrophic calcification    Atherosclerosis      Electronically signed by: Marc Fairchild  Jarad Serrano  Date:    04/20/2024  Time:    13:31               Narrative:    EXAMINATION:  CT ABDOMEN PELVIS WITH IV CONTRAST    CLINICAL HISTORY:  Epigastric pain;    TECHNIQUE:  Low dose axial images, sagittal and coronal reformations were obtained from the lung bases to the pubic symphysis following the IV administration of 80 mL of Omnipaque 350    COMPARISON:  None.    FINDINGS:  Abdomen:    - Lung bases: Fibrotic changes including bronchiectasis, reticulation and mosaic attenuation compatible with pulmonary fibrosis.    - Liver: Normal.    - Gallbladder and bile ducts: Unremarkable.    - Spleen: Unremarkable.    - Pancreas: Normal.    - Kidneys: Right lower pole renal cortical scarring and 7 mm dystrophic calcification versus nonobstructing nephrolith lower pole.  Bilateral renal cysts.    - Adrenals: Unremarkable.    - Retroperitoneum:  No significant adenopathy.    - Vascular: Aortic atherosclerosis is present.    - Bowel/mesentery: Unremarkable.    Pelvis:    No pelvic mass, adenopathy, or free fluid.    Bones:  Unremarkable.                                      Assessment/Plan:     * Hypoglycemia  Presents with recurrently hypoglycemia to 51. Last A1c 7.0. reports normal PO intake outpt and denies infectious symptoms. As last A1c was 7.0, possibly over medicated with metformin and sulfonylurea  Will continue on D10, give dose of octreotide given recurrent hypoglycemia  Start diet  Check A1c  Q4 accu check    Hypercalcemia  Calcium today of 11.1. suspect symptoms more related to hypoglycemia rather than calcium  Will check PTH and repeat CMP    GERD (gastroesophageal reflux disease)  Substitute home omeprazole for pepcid, given allergy to protonix    BPH (benign prostatic hyperplasia)  Continue home flomax    Coronary artery disease  Continue aspirin/statin/coreg    Interstitial lung disease  Previous CT with GGO findigns. CT today with pulmonary fibrosis. Seen by pulmonary 2/204 with recommendation for  outpatient PFT. Underwent CORAZON, RF, CC IGG testing during previous hospitalization all within normal limits  Outpatient follow up with pulmonary for PFTs    Type 2 diabetes mellitus without complication, without long-term current use of insulin  Patient's FSGs are uncontrolled due to hypoglycemia on current medication regimen.  Last A1c reviewed-   Lab Results   Component Value Date    HGBA1C 7.0 (H) 02/18/2024     Most recent fingerstick glucose reviewed-   Recent Labs   Lab 04/20/24  1354 04/20/24  1424 04/20/24  1455 04/20/24  1510   POCTGLUCOSE 103 119* 140* 148*     Current correctional scale   held as above   anti-hyperglycemic dose as follows-   Antihyperglycemics (From admission, onward)      None          Hold Oral hypoglycemics while patient is in the hospital.      VTE Risk Mitigation (From admission, onward)           Ordered     Place ANTONIETTA hose  Until discontinued         04/20/24 1555     IP VTE HIGH RISK PATIENT  Once         04/20/24 1555     Place sequential compression device  Until discontinued         04/20/24 1555                  Discussed with ED physician.    VTE: antonietta/scd  Code: full  Diet: diabetic  Dispo: pending improvement in hypoglycemia    On 04/20/2024, patient should be placed in hospital observation services under my care in collaboration with Macario Montgomery MD.      Ousmane Alcantar PA-C  Department of Hospital Medicine  [facility] - Emergency Dept

## 2024-04-20 NOTE — ED PROVIDER NOTES
"Encounter Date: 4/20/2024    SCRIBE #1 NOTE: I, Elidia Bailey, am scribing for, and in the presence of,  Ivette Reid DO. I have scribed the following portions of the note - Other sections scribed: HPI, ROS, PE, MDM.       History     Chief Complaint   Patient presents with    Headache     Pt c/o feeling sick on and off x 6 months. Pt reporting weight loss, vomiting, abdominal pain, and headache. Pt appears weak. Pt says he's been going back and forth to his primary with no relief. Pt has a hx of HTN and DM. Pt diagnosed with pneumonia x 1 week on antiobiotics.      Emmanuel Grant is a 73 y.o. male with Hx of diabetes mellitus, hypertension, kidney stone, and stroke who presents to the ED for chief complaint of  sharp, radiating, 7/10 epigastric abdominal pain since early this morning. Notes current pain has eased since arriving at ED. Patient also endorses 4/10 frontal lobe headache. Patient had been taking Tylenol with no significant relief. Patient reports he has been "feeling sick intermittently for 6 months", noting 20 lbs weight loss and 1 recent episode of white vomit. Patient says he's been going back and forth to his PCP with no relief.  Patient notes a lingering cough after he was diagnosed with pneumonia 1 week ago. Notes he recently finished a 4-day course of antibiotics. Denies any changes in appetite, fever, chills, nausea, or diarrhea. Patient denies history of abdominal surgeries.        The history is provided by the patient. No  was used.     Review of patient's allergies indicates:   Allergen Reactions    Dapagliflozin      Other reaction(s): Other (See Comments)    Pcn [penicillins]     Linagliptin Other (See Comments)     "it knocked me down", "it almost killed me"    Lisinopril Other (See Comments)     cough    Pantoprazole Hives     Past Medical History:   Diagnosis Date    Diabetes mellitus     Hypertension     Kidney stone     Stroke      Past Surgical History: "   Procedure Laterality Date    COLONOSCOPY N/A 3/27/2024    Procedure: COLONOSCOPY;  Surgeon: Ariela Castro MD;  Location: [facility];  Service: Endoscopy;  Laterality: N/A;    ESOPHAGOGASTRODUODENOSCOPY N/A 3/27/2024    Procedure: EGD (ESOPHAGOGASTRODUODENOSCOPY);  Surgeon: Ariela Castro MD;  Location: [facility];  Service: Endoscopy;  Laterality: N/A;    SHOULDER SURGERY Right      Family History   Problem Relation Name Age of Onset    Colon cancer Sister      Esophageal cancer Neg Hx       Social History     Tobacco Use    Smoking status: Never     Passive exposure: Never    Smokeless tobacco: Never   Substance Use Topics    Alcohol use: No    Drug use: Never     Review of Systems   Constitutional:  Positive for unexpected weight change. Negative for chills and fever.   HENT:  Negative for rhinorrhea and sore throat.    Eyes:  Negative for pain.   Respiratory:  Positive for cough. Negative for shortness of breath.    Cardiovascular:  Negative for leg swelling.   Gastrointestinal:  Positive for abdominal pain and vomiting. Negative for diarrhea and nausea.   Genitourinary:  Negative for dysuria.   Musculoskeletal:  Negative for back pain.   Skin:  Negative for rash.   Neurological:  Positive for headaches. Negative for numbness.   Psychiatric/Behavioral:  Negative for agitation.    All other systems reviewed and are negative.      Physical Exam     Initial Vitals [04/20/24 1125]   BP Pulse Resp Temp SpO2   130/83 82 18 98.4 °F (36.9 °C) 95 %      MAP       --         Physical Exam    Nursing note and vitals reviewed.  Constitutional: He appears well-developed and well-nourished.   Patient gave consent to have physical exam performed.    HENT:   Head: Normocephalic and atraumatic.   Right Ear: External ear normal.   Left Ear: External ear normal.   Nose: Nose normal.   Mouth/Throat: Oropharynx is clear and moist.   Eyes: Conjunctivae and EOM are normal. Pupils are equal, round, and reactive to  light.   Neck: Neck supple.   Normal range of motion.  Cardiovascular:  Normal rate, regular rhythm and normal heart sounds.     Exam reveals no gallop and no friction rub.       No murmur heard.  Pulmonary/Chest: Breath sounds normal. No respiratory distress. He has no wheezes. He has no rhonchi. He has no rales.   Abdominal: Abdomen is soft. Bowel sounds are normal. There is abdominal tenderness in the epigastric area. There is no rebound and no guarding.   Musculoskeletal:         General: No tenderness or edema. Normal range of motion.      Cervical back: Normal range of motion and neck supple.      Comments: Well-moving upper and lower extremities.      Neurological: He is alert and oriented to person, place, and time. He has normal strength. No cranial nerve deficit or sensory deficit. GCS eye subscore is 4. GCS verbal subscore is 5. GCS motor subscore is 6.   Cranial nerves II-XII intact. Sensation grossly intact. Bilateral  strength equal. Strength 5/5 to all extremities. Deep tendon reflexes normal.    Skin: Skin is warm and dry. Capillary refill takes less than 2 seconds. No rash noted.   Psychiatric: He has a normal mood and affect. His behavior is normal.         ED Course   Critical Care    Date/Time: 4/21/2024 1:13 PM    Performed by: Ivette Reid DO  Authorized by: Ivette Reid DO  Direct patient critical care time: 12 minutes  Additional history critical care time: 12 minutes  Ordering / reviewing critical care time: 12 minutes  Documentation critical care time: 12 minutes  Consulting other physicians critical care time: 12 minutes  Total critical care time (exclusive of procedural time) : 60 minutes  Critical care was necessary to treat or prevent imminent or life-threatening deterioration of the following conditions: circulatory failure, metabolic crisis and dehydration.  Critical care was time spent personally by me on the following activities: evaluation of patient's response to treatment,  examination of patient, obtaining history from patient or surrogate, ordering and performing treatments and interventions, ordering and review of laboratory studies, ordering and review of radiographic studies, pulse oximetry and re-evaluation of patient's condition.        Labs Reviewed   COMPREHENSIVE METABOLIC PANEL - Abnormal; Notable for the following components:       Result Value    Glucose 61 (*)     Calcium 11.1 (*)     All other components within normal limits   CBC W/ AUTO DIFFERENTIAL - Abnormal; Notable for the following components:    Hemoglobin 13.4 (*)     Hematocrit 38.9 (*)     MCV 81 (*)     Eos # 0.6 (*)     All other components within normal limits   URINALYSIS, REFLEX TO URINE CULTURE - Abnormal; Notable for the following components:    Protein, UA 1+ (*)     Glucose, UA 1+ (*)     All other components within normal limits    Narrative:     Specimen Source->Urine   LACTIC ACID, PLASMA - Abnormal; Notable for the following components:    Lactate (Lactic Acid) 2.9 (*)     All other components within normal limits   POCT GLUCOSE - Abnormal; Notable for the following components:    POCT Glucose 59 (*)     All other components within normal limits   POCT GLUCOSE - Abnormal; Notable for the following components:    POCT Glucose 51 (*)     All other components within normal limits   POCT GLUCOSE - Abnormal; Notable for the following components:    POCT Glucose 202 (*)     All other components within normal limits   POCT GLUCOSE - Abnormal; Notable for the following components:    POCT Glucose 141 (*)     All other components within normal limits   POCT GLUCOSE - Abnormal; Notable for the following components:    POCT Glucose 119 (*)     All other components within normal limits   POCT GLUCOSE - Abnormal; Notable for the following components:    POCT Glucose 140 (*)     All other components within normal limits   POCT GLUCOSE - Abnormal; Notable for the following components:    POCT Glucose 148 (*)      All other components within normal limits   POCT GLUCOSE - Abnormal; Notable for the following components:    POCT Glucose 182 (*)     All other components within normal limits   POCT GLUCOSE - Abnormal; Notable for the following components:    POCT Glucose 165 (*)     All other components within normal limits   POCT GLUCOSE - Abnormal; Notable for the following components:    POCT Glucose 262 (*)     All other components within normal limits   POCT GLUCOSE - Abnormal; Notable for the following components:    POCT Glucose 209 (*)     All other components within normal limits   CULTURE, BLOOD   CULTURE, BLOOD   LIPASE   TROPONIN I   URINALYSIS MICROSCOPIC    Narrative:     Specimen Source->Urine   PTH, INTACT   HEMOGLOBIN A1C   ISTAT PROCEDURE   POCT GLUCOSE   POCT GLUCOSE MONITORING CONTINUOUS   POCT GLUCOSE MONITORING CONTINUOUS   POCT GLUCOSE MONITORING CONTINUOUS   POCT GLUCOSE MONITORING CONTINUOUS   POCT GLUCOSE MONITORING CONTINUOUS          Imaging Results              X-Ray Chest AP Portable (Final result)  Result time 04/20/24 14:22:37      Final result by Yasmine Acosta MD (04/20/24 14:22:37)                   Impression:      Chronic appearing lung findings, no definite acute process seen.      Electronically signed by: Yasmine Acosta MD  Date:    04/20/2024  Time:    14:22               Narrative:    EXAMINATION:  XR CHEST AP PORTABLE    CLINICAL HISTORY:  Hypocalcemia    TECHNIQUE:  Single frontal view of the chest was performed.    COMPARISON:  02/28/2024    FINDINGS:  The cardiac silhouette is midline, normal in size.  Low lung volume.  Coarse increased interstitial lung markings, diffusely seen, both lung fields, unchanged suggestive of underlying interstitial lung disease.  No superimposed acute focal area of airspace consolidation.  No pleural effusion.  No pneumothorax.    The osseous structures appear normal.                                       CT Head Without Contrast (Final  result)  Result time 04/20/24 13:24:03      Final result by Edgard Ann MD (04/20/24 13:24:03)                   Impression:      No acute intracranial process.  Chronic ischemic changes.      Electronically signed by: Edgard Ann  Date:    04/20/2024  Time:    13:24               Narrative:    EXAMINATION:  CT HEAD WITHOUT CONTRAST    CLINICAL HISTORY:  Headache, new or worsening (Age >= 50y);    TECHNIQUE:  Low dose axial images were obtained through the head.  Coronal and sagittal reformations were also performed. Contrast was not administered.    COMPARISON:  None.    FINDINGS:  No evidence of hydrocephalus, mass effect, intracranial hemorrhage or acute territorial infarct.    Chronic lacunar infarcts bilateral basal ganglia.  Decreased attenuation in the periventricular white matter is nonspecific but may reflect mild to moderate chronic small vessel ischemic change.    Prominent atherosclerotic vascular calcifications are noted at the skull base.    The calvarium is intact.  Nonspecific small occipital calvarial defect may potentially an represent an arachnoid granulation.  The visualized sinuses and mastoid air cells are essentially clear with only minimal left maxillary mucosal thickening.                                       CT Abdomen Pelvis With IV Contrast NO Oral Contrast (Final result)  Result time 04/20/24 13:31:19      Final result by Marc Rascon Jr., MD (04/20/24 13:31:19)                   Impression:      No acute intra-abdominal process    Pulmonary fibrosis    Right renal cortical scarring and nonobstructing nephrolith versus dystrophic calcification    Atherosclerosis      Electronically signed by: Marc Reed Jr  Date:    04/20/2024  Time:    13:31               Narrative:    EXAMINATION:  CT ABDOMEN PELVIS WITH IV CONTRAST    CLINICAL HISTORY:  Epigastric pain;    TECHNIQUE:  Low dose axial images, sagittal and coronal reformations were obtained from the lung bases  to the pubic symphysis following the IV administration of 80 mL of Omnipaque 350    COMPARISON:  None.    FINDINGS:  Abdomen:    - Lung bases: Fibrotic changes including bronchiectasis, reticulation and mosaic attenuation compatible with pulmonary fibrosis.    - Liver: Normal.    - Gallbladder and bile ducts: Unremarkable.    - Spleen: Unremarkable.    - Pancreas: Normal.    - Kidneys: Right lower pole renal cortical scarring and 7 mm dystrophic calcification versus nonobstructing nephrolith lower pole.  Bilateral renal cysts.    - Adrenals: Unremarkable.    - Retroperitoneum:  No significant adenopathy.    - Vascular: Aortic atherosclerosis is present.    - Bowel/mesentery: Unremarkable.    Pelvis:    No pelvic mass, adenopathy, or free fluid.    Bones:  Unremarkable.                                       Medications   dextrose 10 % infusion (0 mL/hr Intravenous Stopped 4/20/24 2010)   glucose chewable tablet 16 g (has no administration in time range)   glucose chewable tablet 24 g (has no administration in time range)   glucagon (human recombinant) injection 1 mg (has no administration in time range)   melatonin tablet 6 mg (6 mg Oral Given 4/20/24 2010)   senna-docusate 8.6-50 mg per tablet 1 tablet (has no administration in time range)   acetaminophen tablet 650 mg (650 mg Oral Not Given 4/20/24 2004)   naloxone 0.4 mg/mL injection 0.02 mg (has no administration in time range)   magnesium oxide tablet 800 mg (has no administration in time range)   magnesium oxide tablet 800 mg (has no administration in time range)   sodium chloride 0.9% flush 10 mL (has no administration in time range)   famotidine (PF) injection 20 mg (20 mg Intravenous Given 4/21/24 0855)   amLODIPine tablet 5 mg (5 mg Oral Given 4/21/24 0855)   aspirin chewable tablet 81 mg (81 mg Oral Given 4/21/24 0855)   atorvastatin tablet 40 mg (40 mg Oral Given 4/20/24 2010)   carvediloL tablet 25 mg (25 mg Oral Given 4/21/24 0855)   tamsulosin 24 hr  capsule 0.4 mg (0.4 mg Oral Given 4/21/24 7387)   dextrose 50 % in water (D50W) injection 25 g (25 g Intravenous Given 4/20/24 1204)   iohexoL (OMNIPAQUE 350) injection 80 mL (80 mLs Intravenous Given 4/20/24 1318)   octreotide injection 50 mcg (50 mcg Subcutaneous Given 4/20/24 1631)     Medical Decision Making  Amount and/or Complexity of Data Reviewed  Labs: ordered.  Radiology: ordered.    Risk  OTC drugs.  Prescription drug management.    Medical Decision Making:    This is an evaluation of a 73 y.o. male that presents to the Emergency Department for   Chief Complaint   Patient presents with    Headache     Pt c/o feeling sick on and off x 6 months. Pt reporting weight loss, vomiting, abdominal pain, and headache. Pt appears weak. Pt says he's been going back and forth to his primary with no relief. Pt has a hx of HTN and DM. Pt diagnosed with pneumonia x 1 week on antiobiotics.          The patient is a non-toxic and well appearing patient. On physical exam, patient appears well hydrated with moist mucus membranes. Breath sounds are clear and equal bilaterally with no adventitious breath sounds, tachypnea or respiratory distress. Regular rate and rhythm. No murmurs. Abdomen soft.  Patient is tolerating PO without difficulty. Physical exam otherwise as above.     I have reviewed vital signs and nursing notes.   Vital Signs Are Reassuring.     Based on the patient's symptoms, I am considering and evaluating for the following differential diagnoses: Hypoglycemia, Hyperglycemia, Gastritis, Gastroenteritis, Pancreatitis, Headache, Intractable Headache, Intracranial Mass.     Consider hospitalization for:  Hypoglycemia with severe abdominal pain    Patient is agreeable to transfer and admission to [facility]    ED Course:Treatment in the ED included Physical Exam and medications given in ED  Medications   dextrose 10 % infusion (0 mL/hr Intravenous Stopped 4/20/24 2010)   glucose chewable tablet 16 g  (has no administration in time range)   glucose chewable tablet 24 g (has no administration in time range)   glucagon (human recombinant) injection 1 mg (has no administration in time range)   melatonin tablet 6 mg (6 mg Oral Given 4/20/24 2010)   senna-docusate 8.6-50 mg per tablet 1 tablet (has no administration in time range)   acetaminophen tablet 650 mg (650 mg Oral Not Given 4/20/24 2004)   naloxone 0.4 mg/mL injection 0.02 mg (has no administration in time range)   magnesium oxide tablet 800 mg (has no administration in time range)   magnesium oxide tablet 800 mg (has no administration in time range)   sodium chloride 0.9% flush 10 mL (has no administration in time range)   famotidine (PF) injection 20 mg (20 mg Intravenous Given 4/21/24 0855)   amLODIPine tablet 5 mg (5 mg Oral Given 4/21/24 0855)   aspirin chewable tablet 81 mg (81 mg Oral Given 4/21/24 0855)   atorvastatin tablet 40 mg (40 mg Oral Given 4/20/24 2010)   carvediloL tablet 25 mg (25 mg Oral Given 4/21/24 0855)   tamsulosin 24 hr capsule 0.4 mg (0.4 mg Oral Given 4/21/24 0855)   dextrose 50 % in water (D50W) injection 25 g (25 g Intravenous Given 4/20/24 1204)   iohexoL (OMNIPAQUE 350) injection 80 mL (80 mLs Intravenous Given 4/20/24 1318)   octreotide injection 50 mcg (50 mcg Subcutaneous Given 4/20/24 1631)   .   Patient reports feeling better after treatment in the ER.       External Data/Documents Reviewed: Previous medical records and vital signs reviewed, see HPI and Physical exam.   Labs: ordered and reviewed.  Initial Accu-Chek of 59.  Initial Accu-Chek of 59.  Patient was provided juice and a sandwich.  Repeat Accu-Chek 51.  IV infusion with D10 initiated.    Radiology: ordered as indicated and reviewed.  No pneumothorax  ECG/medicine tests: ordered and independent interpretation performed by Dr. Ivette Reid DO. Decision-making details documented in ED Course.   Cardiac monitor placed for hypoglycemia. Monitor shows Normal Sinus  Rhythm with  rate of 79. Interpreted by Dr. Ivette Reid DO.    Risk  Diagnosis or treatment significantly limited by the following social determinants of health: Body mass index is 21.29 kg/m².     In shared decision making with the patient, we discussed treatment, prescriptions, labs, and imaging results.    I contacted [facility] at time at 3:06 p.m., requesting consult with Dr Storey for admission. Requesting consultation with hospitalist for services not available at this facility.   Discussed patient's presentation, past medical history, physical exam, labs, radiology results, vital signs, and ED course.      Patient accepted by [facility] on-call for Dr Montgomery for transfer and admission at time 3:41 p.m.   At this time patient will be transferred & admitted.  Patient will be transferred via EMS to accepting facility.      At time of admission patient is awake alert oriented x4 speaking clearly in full sentences and moving all 4 extremities.     The following labs and imaging were reviewed:      Admission on 04/20/2024, Discharged on 04/21/2024   Component Date Value Ref Range Status    Sodium 04/20/2024 139  136 - 145 mmol/L Final    Potassium 04/20/2024 3.7  3.5 - 5.1 mmol/L Final    Chloride 04/20/2024 103  95 - 110 mmol/L Final    CO2 04/20/2024 24  23 - 29 mmol/L Final    Glucose 04/20/2024 61 (L)  70 - 110 mg/dL Final    BUN 04/20/2024 16  8 - 23 mg/dL Final    Creatinine 04/20/2024 1.1  0.5 - 1.4 mg/dL Final    Calcium 04/20/2024 11.1 (H)  8.7 - 10.5 mg/dL Final    Total Protein 04/20/2024 7.9  6.0 - 8.4 g/dL Final    Albumin 04/20/2024 3.7  3.5 - 5.2 g/dL Final    Total Bilirubin 04/20/2024 0.6  0.1 - 1.0 mg/dL Final    Comment: For infants and newborns, interpretation of results should be based  on gestational age, weight and in agreement with clinical  observations.    Premature Infant recommended reference ranges:  Up to 24 hours.............<8.0 mg/dL  Up to 48 hours............<12.0 mg/dL  3-5  days..................<15.0 mg/dL  6-29 days.................<15.0 mg/dL      Alkaline Phosphatase 04/20/2024 62  55 - 135 U/L Final    AST 04/20/2024 27  10 - 40 U/L Final    ALT 04/20/2024 26  10 - 44 U/L Final    eGFR 04/20/2024 >60  >60 mL/min/1.73 m^2 Final    Anion Gap 04/20/2024 12  8 - 16 mmol/L Final    WBC 04/20/2024 8.41  3.90 - 12.70 K/uL Final    RBC 04/20/2024 4.79  4.60 - 6.20 M/uL Final    Hemoglobin 04/20/2024 13.4 (L)  14.0 - 18.0 g/dL Final    Hematocrit 04/20/2024 38.9 (L)  40.0 - 54.0 % Final    MCV 04/20/2024 81 (L)  82 - 98 fL Final    MCH 04/20/2024 28.0  27.0 - 31.0 pg Final    MCHC 04/20/2024 34.4  32.0 - 36.0 g/dL Final    RDW 04/20/2024 13.7  11.5 - 14.5 % Final    Platelets 04/20/2024 193  150 - 450 K/uL Final    MPV 04/20/2024 10.0  9.2 - 12.9 fL Final    Immature Granulocytes 04/20/2024 0.1  0.0 - 0.5 % Final    Gran # (ANC) 04/20/2024 4.9  1.8 - 7.7 K/uL Final    Immature Grans (Abs) 04/20/2024 0.01  0.00 - 0.04 K/uL Final    Comment: Mild elevation in immature granulocytes is non specific and   can be seen in a variety of conditions including stress response,   acute inflammation, trauma and pregnancy. Correlation with other   laboratory and clinical findings is essential.      Lymph # 04/20/2024 2.2  1.0 - 4.8 K/uL Final    Mono # 04/20/2024 0.8  0.3 - 1.0 K/uL Final    Eos # 04/20/2024 0.6 (H)  0.0 - 0.5 K/uL Final    Baso # 04/20/2024 0.02  0.00 - 0.20 K/uL Final    nRBC 04/20/2024 0  0 /100 WBC Final    Gran % 04/20/2024 57.9  38.0 - 73.0 % Final    Lymph % 04/20/2024 25.7  18.0 - 48.0 % Final    Mono % 04/20/2024 9.0  4.0 - 15.0 % Final    Eosinophil % 04/20/2024 7.1  0.0 - 8.0 % Final    Basophil % 04/20/2024 0.2  0.0 - 1.9 % Final    Differential Method 04/20/2024 Automated   Final    Specimen UA 04/20/2024 Urine, Clean Catch   Final    Color, UA 04/20/2024 Yellow  Yellow, Straw, Linda Final    Appearance, UA 04/20/2024 Clear  Clear Final    pH, UA 04/20/2024 7.0  5.0 - 8.0  Final    Specific Gravity, UA 04/20/2024 1.030  1.005 - 1.030 Final    Protein, UA 04/20/2024 1+ (A)  Negative Final    Comment: Recommend a 24 hour urine protein or a urine   protein/creatinine ratio if globulin induced proteinuria is  clinically suspected.      Glucose, UA 04/20/2024 1+ (A)  Negative Final    Ketones, UA 04/20/2024 Negative  Negative Final    Bilirubin (UA) 04/20/2024 Negative  Negative Final    Occult Blood UA 04/20/2024 Negative  Negative Final    Nitrite, UA 04/20/2024 Negative  Negative Final    Urobilinogen, UA 04/20/2024 Negative  <2.0 EU/dL Final    Leukocytes, UA 04/20/2024 Negative  Negative Final    POCT Glucose 04/20/2024 59 (L)  70 - 110 mg/dL Final    POCT Glucose 04/20/2024 51 (L)  70 - 110 mg/dL Final    Lipase 04/20/2024 45  4 - 60 U/L Final    Lactate (Lactic Acid) 04/20/2024 2.9 (H)  0.5 - 2.2 mmol/L Final    Comment: Falsely low lactic acid results can be found in samples   containing >=13.0 mg/dL total bilirubin and/or >=3.5 mg/dL   direct bilirubin.      Blood Culture, Routine 04/20/2024 No Growth to date   Preliminary    Blood Culture, Routine 04/20/2024 No Growth to date   Preliminary    Troponin I 04/20/2024 0.012  0.000 - 0.026 ng/mL Final    Comment: The reference interval for Troponin I represents the 99th percentile   cutoff   for our facility and is consistent with 3rd generation assay   performance.      POC PH 04/20/2024 7.409  7.35 - 7.45 Final    POC PCO2 04/20/2024 42.1  35 - 45 mmHg Final    POC PO2 04/20/2024 43  40 - 60 mmHg Final    POC HCO3 04/20/2024 26.7  24 - 28 mmol/L Final    POC BE 04/20/2024 2  -2 to 2 mmol/L Final    POC SATURATED O2 04/20/2024 79  95 - 100 % Final    POC TCO2 04/20/2024 28  24 - 29 mmol/L Final    Sample 04/20/2024 VENOUS   Final    Site 04/20/2024 Other   Final    Allens Test 04/20/2024 N/A   Final    DelSys 04/20/2024 Room Air   Final    POCT Glucose 04/20/2024 202 (H)  70 - 110 mg/dL Final    POCT Glucose 04/20/2024 141 (H)  70 -  110 mg/dL Final    POCT Glucose 04/20/2024 103  70 - 110 mg/dL Final    POCT Glucose 04/20/2024 119 (H)  70 - 110 mg/dL Final    RBC, UA 04/20/2024 1  0 - 4 /hpf Final    WBC, UA 04/20/2024 0  0 - 5 /hpf Final    Bacteria 04/20/2024 None  None-Occ /hpf Final    Hyaline Casts, UA 04/20/2024 1  0-1/lpf /lpf Final    Microscopic Comment 04/20/2024 SEE COMMENT   Final    Comment: Other formed elements not mentioned in the report are not   present in the microscopic examination.       POCT Glucose 04/20/2024 140 (H)  70 - 110 mg/dL Final    POCT Glucose 04/20/2024 148 (H)  70 - 110 mg/dL Final    POCT Glucose 04/20/2024 182 (H)  70 - 110 mg/dL Final    POCT Glucose 04/20/2024 165 (H)  70 - 110 mg/dL Final    PTH, Intact 04/20/2024 11.1  9.0 - 77.0 pg/mL Final    POCT Glucose 04/20/2024 262 (H)  70 - 110 mg/dL Final    POCT Glucose 04/20/2024 209 (H)  70 - 110 mg/dL Final    POCT Glucose 04/20/2024 208 (H)  70 - 110 mg/dL Final    WBC 04/21/2024 10.37  3.90 - 12.70 K/uL Final    RBC 04/21/2024 4.72  4.60 - 6.20 M/uL Final    Hemoglobin 04/21/2024 13.2 (L)  14.0 - 18.0 g/dL Final    Hematocrit 04/21/2024 40.0  40.0 - 54.0 % Final    MCV 04/21/2024 85  82 - 98 fL Final    MCH 04/21/2024 28.0  27.0 - 31.0 pg Final    MCHC 04/21/2024 33.0  32.0 - 36.0 g/dL Final    RDW 04/21/2024 13.6  11.5 - 14.5 % Final    Platelets 04/21/2024 203  150 - 450 K/uL Final    MPV 04/21/2024 10.1  9.2 - 12.9 fL Final    Immature Granulocytes 04/21/2024 0.2  0.0 - 0.5 % Final    Gran # (ANC) 04/21/2024 6.9  1.8 - 7.7 K/uL Final    Immature Grans (Abs) 04/21/2024 0.02  0.00 - 0.04 K/uL Final    Comment: Mild elevation in immature granulocytes is non specific and   can be seen in a variety of conditions including stress response,   acute inflammation, trauma and pregnancy. Correlation with other   laboratory and clinical findings is essential.      Lymph # 04/21/2024 1.9  1.0 - 4.8 K/uL Final    Mono # 04/21/2024 0.9  0.3 - 1.0 K/uL Final    Eos #  04/21/2024 0.7 (H)  0.0 - 0.5 K/uL Final    Baso # 04/21/2024 0.02  0.00 - 0.20 K/uL Final    nRBC 04/21/2024 0  0 /100 WBC Final    Gran % 04/21/2024 66.6  38.0 - 73.0 % Final    Lymph % 04/21/2024 17.9 (L)  18.0 - 48.0 % Final    Mono % 04/21/2024 8.3  4.0 - 15.0 % Final    Eosinophil % 04/21/2024 6.8  0.0 - 8.0 % Final    Basophil % 04/21/2024 0.2  0.0 - 1.9 % Final    Differential Method 04/21/2024 Automated   Final    Sodium 04/21/2024 140  136 - 145 mmol/L Final    Potassium 04/21/2024 3.6  3.5 - 5.1 mmol/L Final    Chloride 04/21/2024 100  95 - 110 mmol/L Final    CO2 04/21/2024 29  23 - 29 mmol/L Final    Glucose 04/21/2024 125 (H)  70 - 110 mg/dL Final    BUN 04/21/2024 12  8 - 23 mg/dL Final    Creatinine 04/21/2024 1.0  0.5 - 1.4 mg/dL Final    Calcium 04/21/2024 9.5  8.7 - 10.5 mg/dL Final    Total Protein 04/21/2024 7.2  6.0 - 8.4 g/dL Final    Albumin 04/21/2024 3.6  3.5 - 5.2 g/dL Final    Total Bilirubin 04/21/2024 0.6  0.1 - 1.0 mg/dL Final    Comment: For infants and newborns, interpretation of results should be based  on gestational age, weight and in agreement with clinical  observations.    Premature Infant recommended reference ranges:  Up to 24 hours.............<8.0 mg/dL  Up to 48 hours............<12.0 mg/dL  3-5 days..................<15.0 mg/dL  6-29 days.................<15.0 mg/dL      Alkaline Phosphatase 04/21/2024 57  55 - 135 U/L Final    AST 04/21/2024 22  10 - 40 U/L Final    ALT 04/21/2024 21  10 - 44 U/L Final    eGFR 04/21/2024 >60  >60 mL/min/1.73 m^2 Final    Anion Gap 04/21/2024 11  8 - 16 mmol/L Final    POCT Glucose 04/21/2024 135 (H)  70 - 110 mg/dL Final    POCT Glucose 04/21/2024 166 (H)  70 - 110 mg/dL Final    POCT Glucose 04/21/2024 183 (H)  70 - 110 mg/dL Final        Imaging Results              X-Ray Chest AP Portable (Final result)  Result time 04/20/24 14:22:37      Final result by Yasmine Acosta MD (04/20/24 14:22:37)                   Impression:       Chronic appearing lung findings, no definite acute process seen.      Electronically signed by: Yasmine Acosta MD  Date:    04/20/2024  Time:    14:22               Narrative:    EXAMINATION:  XR CHEST AP PORTABLE    CLINICAL HISTORY:  Hypocalcemia    TECHNIQUE:  Single frontal view of the chest was performed.    COMPARISON:  02/28/2024    FINDINGS:  The cardiac silhouette is midline, normal in size.  Low lung volume.  Coarse increased interstitial lung markings, diffusely seen, both lung fields, unchanged suggestive of underlying interstitial lung disease.  No superimposed acute focal area of airspace consolidation.  No pleural effusion.  No pneumothorax.    The osseous structures appear normal.                                       CT Head Without Contrast (Final result)  Result time 04/20/24 13:24:03      Final result by Edgard Ann MD (04/20/24 13:24:03)                   Impression:      No acute intracranial process.  Chronic ischemic changes.      Electronically signed by: Edgard Ann  Date:    04/20/2024  Time:    13:24               Narrative:    EXAMINATION:  CT HEAD WITHOUT CONTRAST    CLINICAL HISTORY:  Headache, new or worsening (Age >= 50y);    TECHNIQUE:  Low dose axial images were obtained through the head.  Coronal and sagittal reformations were also performed. Contrast was not administered.    COMPARISON:  None.    FINDINGS:  No evidence of hydrocephalus, mass effect, intracranial hemorrhage or acute territorial infarct.    Chronic lacunar infarcts bilateral basal ganglia.  Decreased attenuation in the periventricular white matter is nonspecific but may reflect mild to moderate chronic small vessel ischemic change.    Prominent atherosclerotic vascular calcifications are noted at the skull base.    The calvarium is intact.  Nonspecific small occipital calvarial defect may potentially an represent an arachnoid granulation.  The visualized sinuses and mastoid air cells are  essentially clear with only minimal left maxillary mucosal thickening.                                       CT Abdomen Pelvis With IV Contrast NO Oral Contrast (Final result)  Result time 04/20/24 13:31:19      Final result by Marc Rascon Jr., MD (04/20/24 13:31:19)                   Impression:      No acute intra-abdominal process    Pulmonary fibrosis    Right renal cortical scarring and nonobstructing nephrolith versus dystrophic calcification    Atherosclerosis      Electronically signed by: Marc Reed Jr  Date:    04/20/2024  Time:    13:31               Narrative:    EXAMINATION:  CT ABDOMEN PELVIS WITH IV CONTRAST    CLINICAL HISTORY:  Epigastric pain;    TECHNIQUE:  Low dose axial images, sagittal and coronal reformations were obtained from the lung bases to the pubic symphysis following the IV administration of 80 mL of Omnipaque 350    COMPARISON:  None.    FINDINGS:  Abdomen:    - Lung bases: Fibrotic changes including bronchiectasis, reticulation and mosaic attenuation compatible with pulmonary fibrosis.    - Liver: Normal.    - Gallbladder and bile ducts: Unremarkable.    - Spleen: Unremarkable.    - Pancreas: Normal.    - Kidneys: Right lower pole renal cortical scarring and 7 mm dystrophic calcification versus nonobstructing nephrolith lower pole.  Bilateral renal cysts.    - Adrenals: Unremarkable.    - Retroperitoneum:  No significant adenopathy.    - Vascular: Aortic atherosclerosis is present.    - Bowel/mesentery: Unremarkable.    Pelvis:    No pelvic mass, adenopathy, or free fluid.    Bones:  Unremarkable.                                           Scribe Attestation:   Scribe #1: I performed the above scribed service and the documentation accurately describes the services I performed. I attest to the accuracy of the note.        ED Course as of 04/21/24 1317   Sat Apr 20, 2024   1237 EKG interpreted by Dr. Reid.  No STEMI.  Normal sinus rhythm, ventricular rate of 78.   Normal axis.  Abnormal EKG.  When compared to previous EKG done on 02/28/2024 rate has increased by 1 beats per minute today [RF]      ED Course User Index  [RF] Ivette Reid DO                          I, Dr. Ivette Reid, personally performed the services described in this documentation. This document was produced by a scribe under my direction and in my presence. All medical record entries made by the scribe were at my direction and in my presence.  I have reviewed the chart and agree that the record reflects my personal performance and is accurate and complete. Ivette Reid DO.     04/21/2024 1:16 PM    Clinical Impression:  Final diagnoses:  [R07.9] Chest pain  [E16.2] Hypoglycemia  [E83.52] Hypercalcemia          ED Disposition Condition    Observation                 Ivette Reid DO  04/21/24 5036

## 2024-04-20 NOTE — ASSESSMENT & PLAN NOTE
Previous CT with GGO findigns. CT today with pulmonary fibrosis. Seen by pulmonary 2/204 with recommendation for outpatient PFT. Underwent CORAZON, RF, CC IGG testing during previous hospitalization all within normal limits  Outpatient follow up with pulmonary for PFTs

## 2024-04-21 VITALS
BODY MASS INDEX: 21.22 KG/M2 | TEMPERATURE: 98 F | RESPIRATION RATE: 18 BRPM | WEIGHT: 140 LBS | DIASTOLIC BLOOD PRESSURE: 84 MMHG | SYSTOLIC BLOOD PRESSURE: 136 MMHG | HEIGHT: 68 IN | HEART RATE: 83 BPM | OXYGEN SATURATION: 94 %

## 2024-04-21 LAB
ALBUMIN SERPL BCP-MCNC: 3.6 G/DL (ref 3.5–5.2)
ALP SERPL-CCNC: 57 U/L (ref 55–135)
ALT SERPL W/O P-5'-P-CCNC: 21 U/L (ref 10–44)
ANION GAP SERPL CALC-SCNC: 11 MMOL/L (ref 8–16)
AST SERPL-CCNC: 22 U/L (ref 10–40)
BASOPHILS # BLD AUTO: 0.02 K/UL (ref 0–0.2)
BASOPHILS NFR BLD: 0.2 % (ref 0–1.9)
BILIRUB SERPL-MCNC: 0.6 MG/DL (ref 0.1–1)
BUN SERPL-MCNC: 12 MG/DL (ref 8–23)
CALCIUM SERPL-MCNC: 9.5 MG/DL (ref 8.7–10.5)
CHLORIDE SERPL-SCNC: 100 MMOL/L (ref 95–110)
CO2 SERPL-SCNC: 29 MMOL/L (ref 23–29)
CREAT SERPL-MCNC: 1 MG/DL (ref 0.5–1.4)
DIFFERENTIAL METHOD BLD: ABNORMAL
EOSINOPHIL # BLD AUTO: 0.7 K/UL (ref 0–0.5)
EOSINOPHIL NFR BLD: 6.8 % (ref 0–8)
ERYTHROCYTE [DISTWIDTH] IN BLOOD BY AUTOMATED COUNT: 13.6 % (ref 11.5–14.5)
EST. GFR  (NO RACE VARIABLE): >60 ML/MIN/1.73 M^2
GLUCOSE SERPL-MCNC: 125 MG/DL (ref 70–110)
HCT VFR BLD AUTO: 40 % (ref 40–54)
HGB BLD-MCNC: 13.2 G/DL (ref 14–18)
IMM GRANULOCYTES # BLD AUTO: 0.02 K/UL (ref 0–0.04)
IMM GRANULOCYTES NFR BLD AUTO: 0.2 % (ref 0–0.5)
LYMPHOCYTES # BLD AUTO: 1.9 K/UL (ref 1–4.8)
LYMPHOCYTES NFR BLD: 17.9 % (ref 18–48)
MCH RBC QN AUTO: 28 PG (ref 27–31)
MCHC RBC AUTO-ENTMCNC: 33 G/DL (ref 32–36)
MCV RBC AUTO: 85 FL (ref 82–98)
MONOCYTES # BLD AUTO: 0.9 K/UL (ref 0.3–1)
MONOCYTES NFR BLD: 8.3 % (ref 4–15)
NEUTROPHILS # BLD AUTO: 6.9 K/UL (ref 1.8–7.7)
NEUTROPHILS NFR BLD: 66.6 % (ref 38–73)
NRBC BLD-RTO: 0 /100 WBC
PLATELET # BLD AUTO: 203 K/UL (ref 150–450)
PMV BLD AUTO: 10.1 FL (ref 9.2–12.9)
POCT GLUCOSE: 135 MG/DL (ref 70–110)
POCT GLUCOSE: 166 MG/DL (ref 70–110)
POCT GLUCOSE: 183 MG/DL (ref 70–110)
POTASSIUM SERPL-SCNC: 3.6 MMOL/L (ref 3.5–5.1)
PROT SERPL-MCNC: 7.2 G/DL (ref 6–8.4)
PTH-INTACT SERPL-MCNC: 11.1 PG/ML (ref 9–77)
RBC # BLD AUTO: 4.72 M/UL (ref 4.6–6.2)
SODIUM SERPL-SCNC: 140 MMOL/L (ref 136–145)
WBC # BLD AUTO: 10.37 K/UL (ref 3.9–12.7)

## 2024-04-21 PROCEDURE — G0378 HOSPITAL OBSERVATION PER HR: HCPCS

## 2024-04-21 PROCEDURE — 96376 TX/PRO/DX INJ SAME DRUG ADON: CPT

## 2024-04-21 PROCEDURE — 80053 COMPREHEN METABOLIC PANEL: CPT | Performed by: PHYSICIAN ASSISTANT

## 2024-04-21 PROCEDURE — 85025 COMPLETE CBC W/AUTO DIFF WBC: CPT | Performed by: PHYSICIAN ASSISTANT

## 2024-04-21 PROCEDURE — 25000003 PHARM REV CODE 250: Performed by: PHYSICIAN ASSISTANT

## 2024-04-21 PROCEDURE — 36415 COLL VENOUS BLD VENIPUNCTURE: CPT | Performed by: PHYSICIAN ASSISTANT

## 2024-04-21 RX ORDER — FAMOTIDINE 20 MG/1
20 TABLET, FILM COATED ORAL 2 TIMES DAILY
Qty: 180 TABLET | Refills: 0 | Status: SHIPPED | OUTPATIENT
Start: 2024-04-21

## 2024-04-21 RX ORDER — FAMOTIDINE 20 MG/1
20 TABLET, FILM COATED ORAL 2 TIMES DAILY
Qty: 60 TABLET | Refills: 0 | Status: SHIPPED | OUTPATIENT
Start: 2024-04-21 | End: 2024-04-21

## 2024-04-21 RX ADMIN — AMLODIPINE BESYLATE 5 MG: 5 TABLET ORAL at 08:04

## 2024-04-21 RX ADMIN — FAMOTIDINE 20 MG: 10 INJECTION INTRAVENOUS at 08:04

## 2024-04-21 RX ADMIN — TAMSULOSIN HYDROCHLORIDE 0.4 MG: 0.4 CAPSULE ORAL at 08:04

## 2024-04-21 RX ADMIN — CARVEDILOL 25 MG: 12.5 TABLET, FILM COATED ORAL at 08:04

## 2024-04-21 RX ADMIN — ASPIRIN 81 MG CHEWABLE TABLET 81 MG: 81 TABLET CHEWABLE at 08:04

## 2024-04-21 NOTE — NURSING
AVS provided to the patient. PIV and telemetry removed. Pt stable and cooperative. All belongings in pt's possession. VN notified that patient is ready for teaching.

## 2024-04-21 NOTE — PROGRESS NOTES
PT standing in room with no family at bedside.  Pt denies wish to have family present for teachging.      AVS virtually reviewed with patient in its entirety with emphasis on medications, follow-up appointments and reasons to return to the ED or contact the [facility] On [facility] Nurse Care Line. Patient also encouraged to utilize their patient portal. Ease and convenience of use reiterated. Education complete and patient voiced understanding. All questions answered. Discharge teaching complete.

## 2024-04-21 NOTE — NURSING
Nurse summoned to patient room, states that he feels like he has gas in his chest. Vitals obtained. /94, P 99, R 18, SPO2 93%. Will Continue to Monitor. Primary nurse notified, will follow up with MD.

## 2024-04-21 NOTE — HOSPITAL COURSE
Emmanuel Grant 73 y.o. male with CAD, DM, BPH, HTN, presents to the hospital with a chief complaint of weakness. Pt was found to be hypoglycemic as low as 51 afebrile without leukocytosis initial lactic acid 2.9 troponin negative calcium 11.1 chest x-ray with chronic appearing lung findings no definite acute process seen CT abdomen pelvis without acute intra-abdominal process though pulmonary fibrosis, CT head without acute intracranial process.    Pt started on treatment w/ D10, his symptoms have significanty improved following correction of his blood sugars. Suspect patient hypoglycemia to be 2/2 medication side effect (sulfonylurea) shayna following recent increase in his dose. We discontinued Glimepiride upon discharge and recommended close follow up w/ primary/endocrine.

## 2024-04-21 NOTE — DISCHARGE SUMMARY
[facility]  Discharge Summary      Patient Name: Emmanuel Grant  MRN: [id number]  ALVARO: [id number]  Patient Class: OP- Observation  Admission Date: 4/20/2024  Hospital Length of Stay: 0 days  Discharge Date and Time:  04/21/2024 5:28 PM  Attending Physician: No att. providers found   Discharging Provider: Shireen Beyer MD  Primary Care Provider: Payam Vu Jr., NP    Primary Care Team: DIONNA HANNON    HPI:   Emmanuel Grant 73 y.o. male with CAD, DM, BPH, HTN, presents to the hospital with a chief complaint of weakness.  He reports 2 days of intermittent weakness fatigue with associated diaphoresis.  He found his symptoms were more severe this morning causing presentation in the emergency room.  He reports they feel improved with treatment in the ER.  He states he last took his sulfonylurea this morning prior to arrival hospital.  He denies fever chest pain nausea vomiting abdominal leg swelling melena hematuria hematemesis dizziness syncope.  He has been maintaining normal p.o. intake outpatient and ate a full dinner last night.    In the ED, her currently hypoglycemic as low as 51 afebrile without leukocytosis initial lactic acid 2.9 troponin negative calcium 11.1 chest x-ray with chronic appearing lung findings no definite acute process seen CT abdomen pelvis without acute intra-abdominal process though pulmonary fibrosis, CT head without acute intracranial process.    * No surgery found *      Hospital Course:   Emmanuel Grant 73 y.o. male with CAD, DM, BPH, HTN, presents to the hospital with a chief complaint of weakness. Pt was found to be hypoglycemic as low as 51 afebrile without leukocytosis initial lactic acid 2.9 troponin negative calcium 11.1 chest x-ray with chronic appearing lung findings no definite acute process seen CT abdomen pelvis without acute intra-abdominal process though pulmonary fibrosis, CT head without acute intracranial process.    Pt started on  treatment w/ D10, his symptoms have significanty improved following correction of his blood sugars. Suspect patient hypoglycemia to be 2/2 medication side effect (sulfonylurea) shayna following recent increase in his dose. We discontinued Glimepiride upon discharge and recommended close follow up w/ primary/endocrine.        Goals of Care Treatment Preferences:  Code Status: Full Code      Consults:   Consults (From admission, onward)          Status Ordering Provider     Case Management/  Once        Provider:  (Not yet assigned)    Completed SHOWTY PATEL            Renal/  Hypercalcemia  Calcium on admission 11.1. suspect symptoms more related to hypoglycemia rather than calcium  PTH WNL   repeat CMP w/ improved Ca to 9.5      Final Active Diagnoses:    Diagnosis Date Noted POA    PRINCIPAL PROBLEM:  Hypoglycemia [E16.2] 04/20/2024 Unknown    GERD (gastroesophageal reflux disease) [K21.9] 04/20/2024 Unknown    Hypercalcemia [E83.52] 04/20/2024 Unknown    Coronary artery disease [I25.10] 02/28/2024 Yes    BPH (benign prostatic hyperplasia) [N40.0] 02/28/2024 Yes    Interstitial lung disease [J84.9] 02/28/2024 Yes    Type 2 diabetes mellitus without complication, without long-term current use of insulin [E11.9] 10/08/2021 Yes      Problems Resolved During this Admission:       Discharged Condition: good    Disposition: Home or Self Care    Follow Up:   Follow-up Information       Payam Vu Jr., NP. Schedule an appointment as soon as possible for a visit in 2 week(s).    Specialty: Internal Medicine  Why: Call to schedule your hosspital followup appointment.  Request to be seen by 05/05/2024.  Contact information:  [address] [place] [address]  PRIMARY CARE [facility] [address] [phone]                           Patient Instructions:      Diet diabetic     Activity as tolerated       Significant Diagnostic Studies: Labs: CMP   Recent Labs   Lab 04/20/24  1143 04/21/24  0429    140   K 3.7 3.6     100   CO2 24 29   GLU 61* 125*   BUN 16 12   CREATININE 1.1 1.0   CALCIUM 11.1* 9.5   PROT 7.9 7.2   ALBUMIN 3.7 3.6   BILITOT 0.6 0.6   ALKPHOS 62 57   AST 27 22   ALT 26 21   ANIONGAP 12 11       Pending Diagnostic Studies:       None           Medications:  Reconciled Home Medications:      Medication List        START taking these medications      famotidine 20 MG tablet  Commonly known as: PEPCID  Take 1 tablet (20 mg total) by mouth 2 (two) times daily.            CONTINUE taking these medications      amLODIPine 5 MG tablet  Commonly known as: NORVASC  Take 5 mg by mouth once daily.  Notes to patient: Blood pressure     aspirin 81 MG Chew  Take 81 mg by mouth once daily.     atorvastatin 40 MG tablet  Commonly known as: LIPITOR  Take 40 mg by mouth every evening.  Notes to patient: cholesterol     BEANO ORAL  Take 1 tablet by mouth 2 (two) times a day.  Notes to patient: gas     carvediloL 25 MG tablet  Commonly known as: COREG  Take 25 mg by mouth 2 (two) times daily with meals.  Notes to patient: Blood pressure, heart medication     cholecalciferol (vitamin D3) 50 mcg (2,000 unit) Cap capsule  Commonly known as: VITAMIN D3  1 capsule.     DRY EYE RELIEF OPHT  Apply to eye.     latanoprost 0.005 % ophthalmic solution  Place 1 drop into both eyes every evening.     magnesium citrate solution  Take half the bottle for constipation as needed.     metFORMIN 1000 MG tablet  Commonly known as: GLUCOPHAGE  Take 0.5 tablets (500 mg total) by mouth daily with breakfast.     montelukast 10 mg tablet  Commonly known as: SINGULAIR  Take 10 mg by mouth every evening.     sucralfate 1 gram tablet  Commonly known as: CARAFATE  Take 1 g by mouth 4 (four) times daily.     tamsulosin 0.4 mg Cap  Commonly known as: FLOMAX  Take 1 capsule (0.4 mg total) by mouth once daily.            STOP taking these medications      glimepiride 2 MG tablet  Commonly known as: AMARYL              Indwelling Lines/Drains at time of  discharge:   Lines/Drains/Airways       None                   Time spent on the discharge of patient: > 30 minutes         Shireen Beyer MD  Department of Hospital Medicine  [facility] - Observation

## 2024-04-21 NOTE — PLAN OF CARE
[facility] [place] - Observation  Discharge Assessment    Primary Care Provider: Payam Vu Jr., NP   Case Management Assessment     PCP: See above  Pharmacy: [facility] in [place]    Patient Arrived From: home alone  Existing Help at Home: none but has a brother who lives in [place]    Barriers to Discharge: none    Discharge Plan:    A. home   B. home      Patient from home alone.  Feels health has declined since he retired last year.  Hx of acid reflux.  Patient stated that an [facility] was recommended but he would prefer to followup with doctor at [facility] since it is closer to home.            Discharge Assessment (most recent)       BRIEF DISCHARGE ASSESSMENT - 04/21/24 0931          Discharge Planning    Assessment Type Discharge Planning Brief Assessment     Resource/Environmental Concerns none     Support Systems Other (Comment)   [email or web address]    Assistance Needed none     Equipment Currently Used at Home glucometer     Current Living Arrangements home     Care Facility Name n/a     Patient/Family Anticipates Transition to home     Patient/Family Anticipated Services at Transition outpatient care     DME Needed Upon Discharge  none     Discharge Plan A Home     Discharge Plan B Home

## 2024-04-21 NOTE — PLAN OF CARE
[facility] - Observation  Discharge Final Note    Primary Care Provider: Payam Vu Jr., NP  Case Management Final Discharge Note      Discharge Disposition: home alone    New DME ordered / company name: none    Relevant SDOH / Transition of Care Barriers:  n/a    Primary Caretaker and contact information: self    Scheduled followup appointment: Patient to schedule 2 week hospital followup with PCP .  Weekend discharge.    Referrals placed: n/a    Transportation: family        Patient and family educated on discharge services and updated on DC plan. Bedside RN notified, patient clear to discharge from Case Management Perspective.     Expected Discharge Date: 4/21/2024    Final Discharge Note (most recent)       Final Note - 04/21/24 1121          Final Note    Assessment Type Final Discharge Note     Anticipated Discharge Disposition Home or Self Care     What phone number can be called within the next 1-3 days to see how you are doing after discharge? [phone]     Hospital Resources/Appts/Education Provided Post-Acute resouces added to AVS   Weekend discharge. CM unable to schedule appt with [facility] doctor.       Post-Acute Status    Discharge Delays None known at this time                     Important Message from Medicare             Contact Info       Payam Vu Jr., NP   Specialty: Internal Medicine   Relationship: PCP - General    [address] [place] [address]  PRIMARY CARE [facility] [address]   Phone: [phone]       Next Steps: Schedule an appointment as soon as possible for a visit in 2 week(s)    Instructions: Call to schedule your hosspital followup appointment.  Request to be seen by 05/05/2024.

## 2024-04-21 NOTE — ASSESSMENT & PLAN NOTE
Calcium on admission 11.1. suspect symptoms more related to hypoglycemia rather than calcium  PTH WNL   repeat CMP w/ improved Ca to 9.5

## 2024-04-21 NOTE — NURSING
Patient arrived to floor via stretcher via transporter from ED.  Patient transferred to bed independently.  AAOx4.  Patient was oriented to room, information on whiteboard, and medication regimen.  Bed low, adequate lighting provided, side rails x2 up, call bell within reach.  Admission assessment completed.VSS.  Patient denied having any acute distress at this time.  None observed.  Will continue to monitor and follow treatment plan.   [facility]  Nurses Note -- 4 Eyes      4/20/2024       Skin assessed on: Admit      [x] No Pressure Injuries Present    [x]Prevention Measures Documented    [] Yes LDA  for Pressure Injury Previously documented     [] Yes New Pressure Injury Discovered   [] LDA for New Pressure Injury Added      Attending RN:  Dulce Vuong LPN     Second RN:  Arminda KirklandRN

## 2024-04-22 LAB
OHS QRS DURATION: 98 MS
OHS QTC CALCULATION: 433 MS

## 2024-04-24 LAB
BACTERIA BLD CULT: NORMAL
BACTERIA BLD CULT: NORMAL

## 2024-05-01 ENCOUNTER — HOSPITAL ENCOUNTER (INPATIENT)
Facility: HOSPITAL | Age: 74
LOS: 1 days | Discharge: HOME | DRG: 196 | End: 2024-05-02
Attending: EMERGENCY MEDICINE | Admitting: HOSPITALIST
Payer: MEDICARE

## 2024-05-01 DIAGNOSIS — R06.09 DYSPNEA ON EXERTION: ICD-10-CM

## 2024-05-01 DIAGNOSIS — J84.9 ILD (INTERSTITIAL LUNG DISEASE): Primary | ICD-10-CM

## 2024-05-01 DIAGNOSIS — I50.22 CHRONIC HFREF (HEART FAILURE WITH REDUCED EJECTION FRACTION): ICD-10-CM

## 2024-05-01 DIAGNOSIS — E87.6 HYPOKALEMIA: ICD-10-CM

## 2024-05-01 DIAGNOSIS — R07.9 CHEST PAIN: ICD-10-CM

## 2024-05-01 DIAGNOSIS — R73.9 HYPERGLYCEMIA: ICD-10-CM

## 2024-05-01 DIAGNOSIS — R09.02 HYPOXIA: ICD-10-CM

## 2024-05-01 DIAGNOSIS — R06.02 SOB (SHORTNESS OF BREATH): ICD-10-CM

## 2024-05-01 PROBLEM — I10 ESSENTIAL HYPERTENSION: Status: ACTIVE | Noted: 2020-05-29

## 2024-05-01 PROBLEM — E78.5 HYPERLIPIDEMIA: Status: ACTIVE | Noted: 2020-05-29

## 2024-05-01 LAB
ALBUMIN SERPL BCP-MCNC: 3.6 G/DL (ref 3.5–5.2)
ALBUMIN SERPL-MCNC: 3.8 G/DL (ref 3.3–5.5)
ALLENS TEST: ABNORMAL
ALP SERPL-CCNC: 59 U/L (ref 55–135)
ALP SERPL-CCNC: 66 U/L (ref 42–141)
ALT SERPL W/O P-5'-P-CCNC: 17 U/L (ref 10–44)
ANION GAP SERPL CALC-SCNC: 15 MMOL/L (ref 8–16)
AST SERPL-CCNC: 22 U/L (ref 10–40)
BASOPHILS # BLD AUTO: 0.01 K/UL (ref 0–0.2)
BASOPHILS NFR BLD: 0.1 % (ref 0–1.9)
BILIRUB SERPL-MCNC: 0.5 MG/DL (ref 0.1–1)
BILIRUB SERPL-MCNC: 0.9 MG/DL (ref 0.2–1.6)
BILIRUBIN, POC UA: NEGATIVE
BLOOD, POC UA: ABNORMAL
BNP SERPL-MCNC: 65 PG/ML (ref 0–99)
BUN SERPL-MCNC: 13 MG/DL (ref 7–22)
BUN SERPL-MCNC: 13 MG/DL (ref 8–23)
CALCIUM SERPL-MCNC: 10 MG/DL (ref 8.7–10.5)
CALCIUM SERPL-MCNC: 10.5 MG/DL (ref 8–10.3)
CHLORIDE SERPL-SCNC: 100 MMOL/L (ref 95–110)
CHLORIDE SERPL-SCNC: 97 MMOL/L (ref 98–108)
CLARITY, POC UA: CLEAR
CO2 SERPL-SCNC: 24 MMOL/L (ref 23–29)
COLOR, POC UA: ABNORMAL
CREAT SERPL-MCNC: 0.8 MG/DL (ref 0.6–1.2)
CREAT SERPL-MCNC: 1.2 MG/DL (ref 0.5–1.4)
DELSYS: ABNORMAL
DIFFERENTIAL METHOD BLD: ABNORMAL
EOSINOPHIL # BLD AUTO: 0.3 K/UL (ref 0–0.5)
EOSINOPHIL NFR BLD: 2.9 % (ref 0–8)
ERYTHROCYTE [DISTWIDTH] IN BLOOD BY AUTOMATED COUNT: 13.9 % (ref 11.5–14.5)
EST. GFR  (NO RACE VARIABLE): >60 ML/MIN/1.73 M^2
GLUCOSE SERPL-MCNC: 275 MG/DL (ref 73–118)
GLUCOSE SERPL-MCNC: 306 MG/DL (ref 70–110)
GLUCOSE, POC UA: ABNORMAL
HCO3 UR-SCNC: 24.1 MMOL/L (ref 24–28)
HCO3 UR-SCNC: 29.5 MMOL/L (ref 24–28)
HCO3 UR-SCNC: 30.7 MMOL/L (ref 24–28)
HCT VFR BLD AUTO: 40.1 % (ref 40–54)
HCT, POC: NORMAL
HGB BLD-MCNC: 13.9 G/DL (ref 14–18)
HGB, POC: NORMAL (ref 14–18)
IMM GRANULOCYTES # BLD AUTO: 0.02 K/UL (ref 0–0.04)
IMM GRANULOCYTES NFR BLD AUTO: 0.2 % (ref 0–0.5)
INFLUENZA A ANTIGEN, POC: NEGATIVE
INFLUENZA B ANTIGEN, POC: NEGATIVE
KETONES, POC UA: NEGATIVE
LDH SERPL L TO P-CCNC: 2.04 MMOL/L (ref 0.5–2.2)
LDH SERPL L TO P-CCNC: 2.79 MMOL/L (ref 0.5–2.2)
LDH SERPL L TO P-CCNC: 2.93 MMOL/L (ref 0.36–1.25)
LEUKOCYTE EST, POC UA: NEGATIVE
LYMPHOCYTES # BLD AUTO: 1.1 K/UL (ref 1–4.8)
LYMPHOCYTES NFR BLD: 12.5 % (ref 18–48)
MAGNESIUM SERPL-MCNC: 1.3 MG/DL (ref 1.6–2.6)
MCH RBC QN AUTO: 28.5 PG (ref 27–31)
MCH, POC: NORMAL
MCHC RBC AUTO-ENTMCNC: 34.7 G/DL (ref 32–36)
MCHC, POC: NORMAL
MCV RBC AUTO: 82 FL (ref 82–98)
MCV, POC: NORMAL
MONOCYTES # BLD AUTO: 0.2 K/UL (ref 0.3–1)
MONOCYTES NFR BLD: 2.2 % (ref 4–15)
MPV, POC: NORMAL
NEUTROPHILS # BLD AUTO: 7.1 K/UL (ref 1.8–7.7)
NEUTROPHILS NFR BLD: 82.1 % (ref 38–73)
NITRITE, POC UA: NEGATIVE
NRBC BLD-RTO: 0 /100 WBC
OHS QRS DURATION: 88 MS
OHS QTC CALCULATION: 436 MS
PCO2 BLDA: 36.7 MMHG (ref 35–45)
PCO2 BLDA: 50.2 MMHG (ref 35–45)
PCO2 BLDA: 55 MMHG (ref 35–45)
PH SMN: 7.34 [PH] (ref 7.35–7.45)
PH SMN: 7.39 [PH] (ref 7.35–7.45)
PH SMN: 7.42 [PH] (ref 7.35–7.45)
PH UR STRIP: 7 [PH]
PHOSPHATE SERPL-MCNC: 2.3 MG/DL (ref 2.7–4.5)
PLATELET # BLD AUTO: 198 K/UL (ref 150–450)
PMV BLD AUTO: 9.8 FL (ref 9.2–12.9)
PO2 BLDA: 15 MMHG (ref 40–60)
PO2 BLDA: 25 MMHG (ref 40–60)
PO2 BLDA: 71 MMHG (ref 80–100)
POC ALT (SGPT): 15 U/L (ref 10–47)
POC AST (SGOT): 24 U/L (ref 11–38)
POC B-TYPE NATRIURETIC PEPTIDE: 82.9 PG/ML (ref 0–100)
POC BE: 0 MMOL/L
POC BE: 2 MMOL/L
POC BE: 5 MMOL/L
POC CARDIAC TROPONIN I: 0 NG/ML (ref 0–0.08)
POC D-DI: <100 NG/ML (ref 0–450)
POC PCO2 TEMP: 36.7 MMHG
POC PH TEMP: 7.42
POC PLATELET COUNT: NORMAL
POC PO2 TEMP: 71 MMHG
POC SATURATED O2: 16 % (ref 95–100)
POC SATURATED O2: 45 % (ref 95–100)
POC SATURATED O2: 95 % (ref 95–100)
POC TCO2: 25 MMOL/L (ref 23–27)
POC TCO2: 31 MMOL/L (ref 18–33)
POC TCO2: 31 MMOL/L (ref 24–29)
POC TCO2: 32 MMOL/L (ref 24–29)
POC TEMPERATURE: ABNORMAL
POCT GLUCOSE: 136 MG/DL (ref 70–110)
POCT GLUCOSE: 215 MG/DL (ref 70–110)
POCT GLUCOSE: 236 MG/DL (ref 70–110)
POTASSIUM BLD-SCNC: 3.4 MMOL/L (ref 3.6–5.1)
POTASSIUM SERPL-SCNC: 4.3 MMOL/L (ref 3.5–5.1)
PROCALCITONIN SERPL IA-MCNC: 0.07 NG/ML
PROCALCITONIN SERPL IA-MCNC: 0.09 NG/ML
PROT SERPL-MCNC: 7.7 G/DL (ref 6–8.4)
PROTEIN, POC UA: ABNORMAL
PROTEIN, POC: 8.6 G/DL (ref 6.4–8.1)
RBC # BLD AUTO: 4.87 M/UL (ref 4.6–6.2)
RBC, POC: NORMAL
RDW, POC: NORMAL
SAMPLE: ABNORMAL
SAMPLE: NORMAL
SITE: ABNORMAL
SODIUM BLD-SCNC: 139 MMOL/L (ref 128–145)
SODIUM SERPL-SCNC: 139 MMOL/L (ref 136–145)
SPECIFIC GRAVITY, POC UA: 1.02
UROBILINOGEN, POC UA: 0.2 E.U./DL
WBC # BLD AUTO: 8.67 K/UL (ref 3.9–12.7)
WBC, POC: NORMAL

## 2024-05-01 PROCEDURE — 99285 EMERGENCY DEPT VISIT HI MDM: CPT | Mod: 25,ER

## 2024-05-01 PROCEDURE — 82803 BLOOD GASES ANY COMBINATION: CPT | Mod: ER

## 2024-05-01 PROCEDURE — 84145 PROCALCITONIN (PCT): CPT | Mod: 91 | Performed by: STUDENT IN AN ORGANIZED HEALTH CARE EDUCATION/TRAINING PROGRAM

## 2024-05-01 PROCEDURE — 96361 HYDRATE IV INFUSION ADD-ON: CPT | Mod: ER

## 2024-05-01 PROCEDURE — 84100 ASSAY OF PHOSPHORUS: CPT | Performed by: STUDENT IN AN ORGANIZED HEALTH CARE EDUCATION/TRAINING PROGRAM

## 2024-05-01 PROCEDURE — 93005 ELECTROCARDIOGRAM TRACING: CPT | Mod: ER

## 2024-05-01 PROCEDURE — 96365 THER/PROPH/DIAG IV INF INIT: CPT | Mod: ER

## 2024-05-01 PROCEDURE — 84145 PROCALCITONIN (PCT): CPT | Performed by: NURSE PRACTITIONER

## 2024-05-01 PROCEDURE — 87040 BLOOD CULTURE FOR BACTERIA: CPT | Mod: 59 | Performed by: NURSE PRACTITIONER

## 2024-05-01 PROCEDURE — 83735 ASSAY OF MAGNESIUM: CPT | Performed by: STUDENT IN AN ORGANIZED HEALTH CARE EDUCATION/TRAINING PROGRAM

## 2024-05-01 PROCEDURE — 93010 ELECTROCARDIOGRAM REPORT: CPT | Mod: ,,, | Performed by: INTERNAL MEDICINE

## 2024-05-01 PROCEDURE — 25000003 PHARM REV CODE 250: Performed by: STUDENT IN AN ORGANIZED HEALTH CARE EDUCATION/TRAINING PROGRAM

## 2024-05-01 PROCEDURE — 80053 COMPREHEN METABOLIC PANEL: CPT | Performed by: STUDENT IN AN ORGANIZED HEALTH CARE EDUCATION/TRAINING PROGRAM

## 2024-05-01 PROCEDURE — 63600175 PHARM REV CODE 636 W HCPCS: Mod: ER | Performed by: EMERGENCY MEDICINE

## 2024-05-01 PROCEDURE — 36415 COLL VENOUS BLD VENIPUNCTURE: CPT | Performed by: STUDENT IN AN ORGANIZED HEALTH CARE EDUCATION/TRAINING PROGRAM

## 2024-05-01 PROCEDURE — 25000242 PHARM REV CODE 250 ALT 637 W/ HCPCS: Mod: ER | Performed by: NURSE PRACTITIONER

## 2024-05-01 PROCEDURE — 83880 ASSAY OF NATRIURETIC PEPTIDE: CPT | Performed by: STUDENT IN AN ORGANIZED HEALTH CARE EDUCATION/TRAINING PROGRAM

## 2024-05-01 PROCEDURE — 63600175 PHARM REV CODE 636 W HCPCS: Performed by: STUDENT IN AN ORGANIZED HEALTH CARE EDUCATION/TRAINING PROGRAM

## 2024-05-01 PROCEDURE — 25500020 PHARM REV CODE 255: Mod: ER | Performed by: EMERGENCY MEDICINE

## 2024-05-01 PROCEDURE — 11000001 HC ACUTE MED/SURG PRIVATE ROOM

## 2024-05-01 PROCEDURE — U0002 COVID-19 LAB TEST NON-CDC: HCPCS | Performed by: STUDENT IN AN ORGANIZED HEALTH CARE EDUCATION/TRAINING PROGRAM

## 2024-05-01 PROCEDURE — 25000242 PHARM REV CODE 250 ALT 637 W/ HCPCS: Mod: ER | Performed by: EMERGENCY MEDICINE

## 2024-05-01 PROCEDURE — 96367 TX/PROPH/DG ADDL SEQ IV INF: CPT | Mod: ER

## 2024-05-01 PROCEDURE — 25000003 PHARM REV CODE 250: Mod: ER | Performed by: EMERGENCY MEDICINE

## 2024-05-01 PROCEDURE — 85025 COMPLETE CBC W/AUTO DIFF WBC: CPT | Performed by: STUDENT IN AN ORGANIZED HEALTH CARE EDUCATION/TRAINING PROGRAM

## 2024-05-01 PROCEDURE — 82962 GLUCOSE BLOOD TEST: CPT | Mod: ER

## 2024-05-01 PROCEDURE — 87634 RSV DNA/RNA AMP PROBE: CPT | Performed by: STUDENT IN AN ORGANIZED HEALTH CARE EDUCATION/TRAINING PROGRAM

## 2024-05-01 RX ORDER — CARVEDILOL 12.5 MG/1
25 TABLET ORAL 2 TIMES DAILY WITH MEALS
Status: DISCONTINUED | OUTPATIENT
Start: 2024-05-01 | End: 2024-05-02 | Stop reason: HOSPADM

## 2024-05-01 RX ORDER — LATANOPROST 50 UG/ML
1 SOLUTION/ DROPS OPHTHALMIC NIGHTLY
Status: DISCONTINUED | OUTPATIENT
Start: 2024-05-01 | End: 2024-05-02 | Stop reason: HOSPADM

## 2024-05-01 RX ORDER — SODIUM,POTASSIUM PHOSPHATES 280-250MG
2 POWDER IN PACKET (EA) ORAL
Status: DISCONTINUED | OUTPATIENT
Start: 2024-05-01 | End: 2024-05-02 | Stop reason: HOSPADM

## 2024-05-01 RX ORDER — PROCHLORPERAZINE EDISYLATE 5 MG/ML
5 INJECTION INTRAMUSCULAR; INTRAVENOUS EVERY 6 HOURS PRN
Status: DISCONTINUED | OUTPATIENT
Start: 2024-05-01 | End: 2024-05-02 | Stop reason: HOSPADM

## 2024-05-01 RX ORDER — ACETAMINOPHEN 325 MG/1
650 TABLET ORAL EVERY 4 HOURS PRN
Status: DISCONTINUED | OUTPATIENT
Start: 2024-05-01 | End: 2024-05-02 | Stop reason: HOSPADM

## 2024-05-01 RX ORDER — SIMETHICONE 80 MG
1 TABLET,CHEWABLE ORAL 4 TIMES DAILY PRN
Status: DISCONTINUED | OUTPATIENT
Start: 2024-05-01 | End: 2024-05-02 | Stop reason: HOSPADM

## 2024-05-01 RX ORDER — ACETAMINOPHEN 325 MG/1
650 TABLET ORAL EVERY 8 HOURS PRN
Status: DISCONTINUED | OUTPATIENT
Start: 2024-05-01 | End: 2024-05-02 | Stop reason: HOSPADM

## 2024-05-01 RX ORDER — ALUMINUM HYDROXIDE, MAGNESIUM HYDROXIDE, AND SIMETHICONE 1200; 120; 1200 MG/30ML; MG/30ML; MG/30ML
30 SUSPENSION ORAL 4 TIMES DAILY PRN
Status: DISCONTINUED | OUTPATIENT
Start: 2024-05-01 | End: 2024-05-02 | Stop reason: HOSPADM

## 2024-05-01 RX ORDER — POLYETHYLENE GLYCOL 3350 17 G/17G
17 POWDER, FOR SOLUTION ORAL DAILY
Status: DISCONTINUED | OUTPATIENT
Start: 2024-05-02 | End: 2024-05-02 | Stop reason: HOSPADM

## 2024-05-01 RX ORDER — TAMSULOSIN HYDROCHLORIDE 0.4 MG/1
0.4 CAPSULE ORAL DAILY
Status: DISCONTINUED | OUTPATIENT
Start: 2024-05-02 | End: 2024-05-02 | Stop reason: HOSPADM

## 2024-05-01 RX ORDER — IBUPROFEN 200 MG
24 TABLET ORAL
Status: DISCONTINUED | OUTPATIENT
Start: 2024-05-01 | End: 2024-05-02 | Stop reason: HOSPADM

## 2024-05-01 RX ORDER — AMLODIPINE BESYLATE 5 MG/1
5 TABLET ORAL DAILY
Status: DISCONTINUED | OUTPATIENT
Start: 2024-05-02 | End: 2024-05-02 | Stop reason: HOSPADM

## 2024-05-01 RX ORDER — IPRATROPIUM BROMIDE AND ALBUTEROL SULFATE 2.5; .5 MG/3ML; MG/3ML
3 SOLUTION RESPIRATORY (INHALATION) ONCE
Status: COMPLETED | OUTPATIENT
Start: 2024-05-01 | End: 2024-05-01

## 2024-05-01 RX ORDER — NALOXONE HCL 0.4 MG/ML
0.02 VIAL (ML) INJECTION
Status: DISCONTINUED | OUTPATIENT
Start: 2024-05-01 | End: 2024-05-02 | Stop reason: HOSPADM

## 2024-05-01 RX ORDER — ENOXAPARIN SODIUM 100 MG/ML
40 INJECTION SUBCUTANEOUS EVERY 24 HOURS
Status: DISCONTINUED | OUTPATIENT
Start: 2024-05-02 | End: 2024-05-02 | Stop reason: HOSPADM

## 2024-05-01 RX ORDER — ASPIRIN 325 MG
325 TABLET ORAL
Status: COMPLETED | OUTPATIENT
Start: 2024-05-01 | End: 2024-05-01

## 2024-05-01 RX ORDER — LANOLIN ALCOHOL/MO/W.PET/CERES
800 CREAM (GRAM) TOPICAL
Status: DISCONTINUED | OUTPATIENT
Start: 2024-05-01 | End: 2024-05-02 | Stop reason: HOSPADM

## 2024-05-01 RX ORDER — TALC
6 POWDER (GRAM) TOPICAL NIGHTLY PRN
Status: DISCONTINUED | OUTPATIENT
Start: 2024-05-01 | End: 2024-05-02 | Stop reason: HOSPADM

## 2024-05-01 RX ORDER — GLUCAGON 1 MG
1 KIT INJECTION
Status: DISCONTINUED | OUTPATIENT
Start: 2024-05-01 | End: 2024-05-02 | Stop reason: HOSPADM

## 2024-05-01 RX ORDER — PREDNISONE 20 MG/1
60 TABLET ORAL
Status: COMPLETED | OUTPATIENT
Start: 2024-05-01 | End: 2024-05-01

## 2024-05-01 RX ORDER — ATORVASTATIN CALCIUM 40 MG/1
40 TABLET, FILM COATED ORAL NIGHTLY
Status: DISCONTINUED | OUTPATIENT
Start: 2024-05-01 | End: 2024-05-02 | Stop reason: HOSPADM

## 2024-05-01 RX ORDER — BISACODYL 10 MG/1
10 SUPPOSITORY RECTAL DAILY PRN
Status: DISCONTINUED | OUTPATIENT
Start: 2024-05-01 | End: 2024-05-02 | Stop reason: HOSPADM

## 2024-05-01 RX ORDER — IPRATROPIUM BROMIDE AND ALBUTEROL SULFATE 2.5; .5 MG/3ML; MG/3ML
3 SOLUTION RESPIRATORY (INHALATION)
Status: COMPLETED | OUTPATIENT
Start: 2024-05-01 | End: 2024-05-01

## 2024-05-01 RX ORDER — INSULIN ASPART 100 [IU]/ML
0-5 INJECTION, SOLUTION INTRAVENOUS; SUBCUTANEOUS
Status: DISCONTINUED | OUTPATIENT
Start: 2024-05-01 | End: 2024-05-02 | Stop reason: HOSPADM

## 2024-05-01 RX ORDER — GUAIFENESIN 100 MG/5ML
200 SOLUTION ORAL EVERY 4 HOURS PRN
Status: DISCONTINUED | OUTPATIENT
Start: 2024-05-01 | End: 2024-05-02 | Stop reason: HOSPADM

## 2024-05-01 RX ORDER — SODIUM CHLORIDE 0.9 % (FLUSH) 0.9 %
10 SYRINGE (ML) INJECTION EVERY 12 HOURS PRN
Status: DISCONTINUED | OUTPATIENT
Start: 2024-05-01 | End: 2024-05-02 | Stop reason: HOSPADM

## 2024-05-01 RX ORDER — IPRATROPIUM BROMIDE AND ALBUTEROL SULFATE 2.5; .5 MG/3ML; MG/3ML
3 SOLUTION RESPIRATORY (INHALATION) EVERY 4 HOURS PRN
Status: DISCONTINUED | OUTPATIENT
Start: 2024-05-01 | End: 2024-05-02 | Stop reason: HOSPADM

## 2024-05-01 RX ORDER — IBUPROFEN 200 MG
16 TABLET ORAL
Status: DISCONTINUED | OUTPATIENT
Start: 2024-05-01 | End: 2024-05-02 | Stop reason: HOSPADM

## 2024-05-01 RX ORDER — IPRATROPIUM BROMIDE AND ALBUTEROL SULFATE 2.5; .5 MG/3ML; MG/3ML
3 SOLUTION RESPIRATORY (INHALATION)
Status: DISCONTINUED | OUTPATIENT
Start: 2024-05-02 | End: 2024-05-02 | Stop reason: HOSPADM

## 2024-05-01 RX ORDER — NAPROXEN SODIUM 220 MG/1
81 TABLET, FILM COATED ORAL DAILY
Status: DISCONTINUED | OUTPATIENT
Start: 2024-05-02 | End: 2024-05-02 | Stop reason: HOSPADM

## 2024-05-01 RX ORDER — SODIUM CHLORIDE 9 MG/ML
1000 INJECTION, SOLUTION INTRAVENOUS CONTINUOUS
Status: DISCONTINUED | OUTPATIENT
Start: 2024-05-01 | End: 2024-05-01

## 2024-05-01 RX ORDER — ONDANSETRON HYDROCHLORIDE 2 MG/ML
4 INJECTION, SOLUTION INTRAVENOUS EVERY 8 HOURS PRN
Status: DISCONTINUED | OUTPATIENT
Start: 2024-05-01 | End: 2024-05-02 | Stop reason: HOSPADM

## 2024-05-01 RX ORDER — MONTELUKAST SODIUM 10 MG/1
10 TABLET ORAL NIGHTLY
Status: DISCONTINUED | OUTPATIENT
Start: 2024-05-01 | End: 2024-05-02 | Stop reason: HOSPADM

## 2024-05-01 RX ADMIN — SODIUM CHLORIDE 1000 ML: 9 INJECTION, SOLUTION INTRAVENOUS at 11:05

## 2024-05-01 RX ADMIN — LATANOPROST 1 DROP: 50 SOLUTION OPHTHALMIC at 10:05

## 2024-05-01 RX ADMIN — POTASSIUM BICARBONATE 20 MEQ: 391 TABLET, EFFERVESCENT ORAL at 01:05

## 2024-05-01 RX ADMIN — AZITHROMYCIN MONOHYDRATE 500 MG: 500 INJECTION, POWDER, LYOPHILIZED, FOR SOLUTION INTRAVENOUS at 12:05

## 2024-05-01 RX ADMIN — CEFTRIAXONE 1 G: 1 INJECTION, POWDER, FOR SOLUTION INTRAMUSCULAR; INTRAVENOUS at 11:05

## 2024-05-01 RX ADMIN — IOHEXOL 75 ML: 350 INJECTION, SOLUTION INTRAVENOUS at 02:05

## 2024-05-01 RX ADMIN — PREDNISONE 60 MG: 20 TABLET ORAL at 04:05

## 2024-05-01 RX ADMIN — ASPIRIN 325 MG ORAL TABLET 325 MG: 325 PILL ORAL at 11:05

## 2024-05-01 RX ADMIN — IPRATROPIUM BROMIDE AND ALBUTEROL SULFATE 3 ML: .5; 3 SOLUTION RESPIRATORY (INHALATION) at 04:05

## 2024-05-01 RX ADMIN — INSULIN ASPART 1 UNITS: 100 INJECTION, SOLUTION INTRAVENOUS; SUBCUTANEOUS at 08:05

## 2024-05-01 RX ADMIN — MONTELUKAST 10 MG: 10 TABLET, FILM COATED ORAL at 08:05

## 2024-05-01 RX ADMIN — SODIUM CHLORIDE 1000 ML: 9 INJECTION, SOLUTION INTRAVENOUS at 01:05

## 2024-05-01 RX ADMIN — CARVEDILOL 25 MG: 12.5 TABLET, FILM COATED ORAL at 08:05

## 2024-05-01 RX ADMIN — ATORVASTATIN CALCIUM 40 MG: 40 TABLET, FILM COATED ORAL at 08:05

## 2024-05-01 RX ADMIN — IPRATROPIUM BROMIDE AND ALBUTEROL SULFATE 3 ML: .5; 3 SOLUTION RESPIRATORY (INHALATION) at 10:05

## 2024-05-01 RX ADMIN — GUAIFENESIN 200 MG: 200 SOLUTION ORAL at 11:05

## 2024-05-01 NOTE — ED PROVIDER NOTES
"Encounter Date: 5/1/2024    SCRIBE #1 NOTE: I, Janis Sauer, am scribing for, and in the presence of,  Ivette Reid DO.       History     Chief Complaint   Patient presents with    Shortness of Breath     A 72 y/o male presents to ER c/o SOB accompanied with cough x 1 month. Pt was Dx with Pneumonia and prescribed medication one month prior. Pt still having SOB and productive cough with white sputum.      73 y.o. male with a PMH of DM. HTN who presents to the Emergency Department with complaints of SOB with a productive cough for 2 months.  Patient states that he was diagnosed with pneumonia in March and completed the ABX at the end of March/beginning of April and the symptoms persisted.  He reports chest "discomfort" with coughing; no at rest.  He denies rash, fever, numbness, weakness, tingling, abdominal pain, back pain, dysuria, hematuria, nausea, vomiting, diarrhea, or any other complaints.   He rates the pain as 0/10 and has not taken any medications for the symptoms.  No Alleviating/aggravating factors.                The history is provided by the patient.     Review of patient's allergies indicates:   Allergen Reactions    Dapagliflozin      Other reaction(s): Other (See Comments)    Pcn [penicillins]     Linagliptin Other (See Comments)     "it knocked me down", "it almost killed me"    Lisinopril Other (See Comments)     cough    Pantoprazole Hives     Past Medical History:   Diagnosis Date    Diabetes mellitus     Hypertension     Kidney stone     Stroke      Past Surgical History:   Procedure Laterality Date    COLONOSCOPY N/A 3/27/2024    Procedure: COLONOSCOPY;  Surgeon: Ariela Castro MD;  Location: [facility];  Service: Endoscopy;  Laterality: N/A;    ESOPHAGOGASTRODUODENOSCOPY N/A 3/27/2024    Procedure: EGD (ESOPHAGOGASTRODUODENOSCOPY);  Surgeon: Ariela Castro MD;  Location: [facility];  Service: Endoscopy;  Laterality: N/A;    SHOULDER SURGERY Right      Family History   Problem " Relation Name Age of Onset    Colon cancer Sister      Esophageal cancer Neg Hx       Social History     Tobacco Use    Smoking status: Never     Passive exposure: Never    Smokeless tobacco: Never   Substance Use Topics    Alcohol use: No    Drug use: Never     Review of Systems   Constitutional:  Negative for chills and fever.   HENT:  Negative for congestion, ear pain, rhinorrhea and sore throat.    Eyes:  Negative for pain, discharge and redness.   Respiratory:  Positive for shortness of breath. Negative for cough.    Cardiovascular:  Positive for chest pain.   Gastrointestinal:  Negative for abdominal pain, diarrhea, nausea and vomiting.   Genitourinary:  Negative for dysuria.   Musculoskeletal:  Negative for back pain, neck pain and neck stiffness.   Skin:  Negative for rash.   Neurological:  Negative for dizziness, weakness, light-headedness, numbness and headaches.   Psychiatric/Behavioral:  Negative for confusion.        Physical Exam     Initial Vitals [05/01/24 0843]   BP Pulse Resp Temp SpO2   (!) 145/88 96 18 97.5 °F (36.4 °C) 95 %      MAP       --         Physical Exam    Nursing note and vitals reviewed.  Constitutional: Vital signs are normal. He appears well-developed. He is cooperative.  Non-toxic appearance. He does not appear ill.   HENT:   Head: Normocephalic and atraumatic.   Right Ear: External ear normal.   Left Ear: External ear normal.   Eyes: Conjunctivae are normal.   Neck:   Normal range of motion.  Cardiovascular:  Normal rate and regular rhythm.           Pulmonary/Chest: Accessory muscle usage present. Tachypnea noted. No respiratory distress. He has wheezes.   Tachypnea at rest with accessory muscle usage; inspiratory and expiratory wheezing bilaterally in all lobes; no distress   Abdominal: Abdomen is soft. There is no abdominal tenderness.   Musculoskeletal:      Cervical back: Normal range of motion.     Neurological: He is alert and oriented to person, place, and time. GCS eye  subscore is 4. GCS verbal subscore is 5. GCS motor subscore is 6.   Skin: Skin is warm, dry and intact. No rash noted.   Psychiatric: He has a normal mood and affect. His speech is normal and behavior is normal. Thought content normal.         ED Course   Critical Care    Date/Time: 5/2/2024 6:53 AM    Performed by: Ivette Reid DO  Authorized by: Ivette Reid DO  Direct patient critical care time: 8 minutes  Additional history critical care time: 8 minutes  Ordering / reviewing critical care time: 8 minutes  Documentation critical care time: 8 minutes  Consulting other physicians critical care time: 8 minutes  Total critical care time (exclusive of procedural time) : 40 minutes  Critical care was necessary to treat or prevent imminent or life-threatening deterioration of the following conditions: circulatory failure, dehydration, metabolic crisis, respiratory failure and sepsis.  Critical care was time spent personally by me on the following activities: evaluation of patient's response to treatment, examination of patient, obtaining history from patient or surrogate, ordering and performing treatments and interventions, ordering and review of laboratory studies, ordering and review of radiographic studies, pulse oximetry, re-evaluation of patient's condition and review of old charts.        Labs Reviewed   POCT URINALYSIS W/O SCOPE - Abnormal; Notable for the following components:       Result Value    Glucose, UA 2+ (*)     Blood, UA Trace-intact (*)     Protein, UA 3+ (*)     All other components within normal limits   POCT GLUCOSE - Abnormal; Notable for the following components:    POCT Glucose 236 (*)     All other components within normal limits   ISTAT PROCEDURE - Abnormal; Notable for the following components:    POC PH 7.338 (*)     POC PCO2 55.0 (*)     POC PO2 15 (*)     POC HCO3 29.5 (*)     POC Lactate 2.79 (*)     POC TCO2 31 (*)     All other components within normal limits   POCT CMP - Abnormal;  Notable for the following components:    Calcium, POC 10.5 (*)     POC Chloride 97 (*)     POC Glucose 275 (*)     POC Potassium 3.4 (*)     Protein, POC 8.6 (*)     All other components within normal limits   ISTAT PROCEDURE - Abnormal; Notable for the following components:    POC PO2 71 (*)     POC Lactate 2.93 (*)     All other components within normal limits   POCT GLUCOSE - Abnormal; Notable for the following components:    POCT Glucose 136 (*)     All other components within normal limits   ISTAT PROCEDURE - Abnormal; Notable for the following components:    POC PCO2 50.2 (*)     POC PO2 25 (*)     POC HCO3 30.7 (*)     POC BE 5 (*)     POC TCO2 32 (*)     All other components within normal limits   PROCALCITONIN   TROPONIN ISTAT   POCT CBC   POCT CMP   POCT TROPONIN   POCT B-TYPE NATRIURETIC PEPTIDE (BNP)   POCT RAPID INFLUENZA A/B   POCT D DIMER   POCT B-TYPE NATRIURETIC PEPTIDE (BNP)     EKG Readings: (Independently Interpreted)   Initial Reading: No STEMI.   Nsr 82  Nl axis   Qtc 446   When compared to previous EKG done on 4/20/24 rate +4 bpm today     ECG Results              EKG 12-lead  (SOB) (Final result)        Collection Time Result Time QRS Duration OHS QTC Calculation    05/01/24 10:34:57 05/01/24 22:36:26 88 436                     Final result by Interface, Lab In [facility] (05/01/24 22:36:33)                   Narrative:    Test Reason : R06.02,    Vent. Rate : 082 BPM     Atrial Rate : 082 BPM     P-R Int : 198 ms          QRS Dur : 088 ms      QT Int : 374 ms       P-R-T Axes : 039 058 086 degrees     QTc Int : 436 ms    Normal sinus rhythm  Minimal voltage criteria for LVH, may be normal variant ( Sokolow-Westfall )  Septal infarct (cited on or before 20-APR-2024)  Abnormal ECG  When compared with ECG of 20-APR-2024 12:34,  Borderline criteria for Lateral infarct are no longer Present  Confirmed by Asha MIRANDA, Ting BULLARD ([id number]) on 5/1/2024 10:36:25 PM    Referred By: [email or web address]   SELF           Confirmed  By:Ting Barry MD                                  Imaging Results              CTA Chest Non-Coronary (PE Studies) (Final result)  Result time 05/01/24 14:48:59      Final result by Isiah Barreto MD (05/01/24 14:48:59)                   Impression:      1. Study considered diagnostic to the proximal segmental pulmonary arterial level.  No convincing evidence of acute pulmonary embolism.  2. Redemonstrated findings in keeping with interstitial lung disease.  Possible UIP pattern.  Additional differential considerations would include fibrotic hypersensitivity pneumonitis or NSIP pattern, among others.  Overall, allowing for differences in technique, no significant progression the fibrotic component of these findings.  Slightly increased conspicuity of ground-glass type attenuation could relate to subsegmental atelectasis and or edema in the appropriate clinical context.  Pulmonary medicine consultation may be of benefit.  3. Additional details, as provided in the body of report.      Electronically signed by: Isiah Barreto  Date:    05/01/2024  Time:    14:48               Narrative:    EXAMINATION:  CTA CHEST NON CORONARY (PE STUDIES)    CLINICAL HISTORY:  Pulmonary embolism (PE) suspected, high prob;    TECHNIQUE:  Low dose axial images, sagittal and coronal reformations were obtained from the thoracic inlet to the lung bases following the IV administration of 75 mL of Omnipaque 350.  Contrast timing was optimized to evaluate the pulmonary arteries.  MIP images were performed.    COMPARISON:  CTA chest performed 07/07/2023.    FINDINGS:  Exam quality: Study considered diagnostic to the proximal segmental pulmonary arterial level.    Pulmonary embolism: No acute or chronic pulmonary embolism.    Central pulmonary arteries: Normal caliber.    Aorta: Normal caliber.    Heart: No right heart strain. Normal size.    Coronary arteries: Moderate calcifications and/or stenting    Pericardium: Normal. No effusion,  thickening, or calcification.    Support tubes and lines: None.    Base of neck/thyroid: Normal.    Lymph nodes: Mildly enlarged right hilar node measuring 11 mm short axis (series 2, image 107).    Esophagus: Normal.    Pleura: No effusion, thickening, or calcification.    Upper abdomen: Unremarkable    Body wall: Unremarkable    Airways: Central airways appear patent.  Lower lobe predominant traction bronchiectasis.    Lungs: Assessment compromised by motion.  Findings in keeping with interstitial lung disease again noted.  There is bilateral reticulation and architectural distortion.  Suggested somewhat lower lung zone and subpleural predominance.  Subpleural cystic change noted.  Somewhat limited comparison to prior CTA chest of 07/07/2023 given differences in technique and phase of respiration.  Slightly increased ground-glass type attenuation on the current study may reflect subsegmental atelectasis and/or edema.  No definite significantly progressed fibrotic change since the prior study.    Bones: No definite acute change.  Osseous demineralization.  Question diffuse idiopathic skeletal hyperostosis.  Moderate compression deformity of the T6 vertebra, similar configuration to the 07/07/2023 CT.                                       US Lower Extremity Veins Bilateral (Final result)  Result time 05/01/24 11:47:24      Final result by Noble Collins MD (05/01/24 11:47:24)                   Impression:      No evidence of deep venous thrombosis in either lower extremity.      Electronically signed by: Noble Collins MD  Date:    05/01/2024  Time:    11:47               Narrative:    EXAMINATION:  US LOWER EXTREMITY VEINS BILATERAL    CLINICAL HISTORY:  Other specified soft tissue disorders    TECHNIQUE:  Duplex and color flow Doppler and dynamic compression was performed of the bilateral lower extremity veins was performed.    COMPARISON:  None    FINDINGS:  Right thigh veins: The common femoral, femoral,  popliteal, upper greater saphenous, and deep femoral veins are patent and free of thrombus. The veins are normally compressible and have normal phasic flow and augmentation response.    Right calf veins: The visualized calf veins are patent.    Left thigh veins: The common femoral, femoral, popliteal, upper greater saphenous, and deep femoral veins are patent and free of thrombus. The veins are normally compressible and have normal phasic flow and augmentation response.    Left calf veins: The visualized calf veins are patent.    Miscellaneous: None                                       X-Ray Chest PA And Lateral (Final result)  Result time 05/01/24 10:50:47      Final result by Yasmine Acosta MD (05/01/24 10:50:47)                   Impression:      Chronic appearing lung findings, no definite superimpose acute process seen.      Electronically signed by: Yasmine Acosta MD  Date:    05/01/2024  Time:    10:50               Narrative:    EXAMINATION:  XR CHEST PA AND LATERAL    CLINICAL HISTORY:  Cough;    TECHNIQUE:  PA and lateral views of the chest were performed.    COMPARISON:  04/20/2024    FINDINGS:  The cardiac silhouette is normal in size, midline.    The pulmonary vascularity is normal.    Abnormal increased reticular lung markings noted bilaterally.    Low lung volume, unchanged.    No superimposed acute focal area of airspace consolidation    No pleural effusion, no pneumothorax.    The osseous structures appear within normal limits for age.                                       Medications   amLODIPine tablet 5 mg (has no administration in time range)   aspirin chewable tablet 81 mg (has no administration in time range)   atorvastatin tablet 40 mg (40 mg Oral Given 5/1/24 2015)   carvediloL tablet 25 mg (25 mg Oral Given 5/1/24 2015)   latanoprost 0.005 % ophthalmic solution 1 drop (1 drop Both Eyes Given 5/1/24 2282)   montelukast tablet 10 mg (10 mg Oral Given 5/1/24 2015)   tamsulosin 24  hr capsule 0.4 mg (has no administration in time range)   sodium chloride 0.9% flush 10 mL (has no administration in time range)   albuterol-ipratropium 2.5 mg-0.5 mg/3 mL nebulizer solution 3 mL (has no administration in time range)   melatonin tablet 6 mg (has no administration in time range)   ondansetron injection 4 mg (has no administration in time range)   prochlorperazine injection Soln 5 mg (has no administration in time range)   polyethylene glycol packet 17 g (has no administration in time range)   bisacodyL suppository 10 mg (has no administration in time range)   acetaminophen tablet 650 mg (has no administration in time range)   simethicone chewable tablet 80 mg (has no administration in time range)   aluminum-magnesium hydroxide-simethicone 200-200-20 mg/5 mL suspension 30 mL (has no administration in time range)   acetaminophen tablet 650 mg (650 mg Oral Given 5/2/24 0151)   naloxone 0.4 mg/mL injection 0.02 mg (has no administration in time range)   potassium bicarbonate disintegrating tablet 50 mEq (has no administration in time range)   potassium bicarbonate disintegrating tablet 35 mEq (has no administration in time range)   potassium bicarbonate disintegrating tablet 60 mEq (has no administration in time range)   magnesium oxide tablet 800 mg (has no administration in time range)   magnesium oxide tablet 800 mg (has no administration in time range)   potassium, sodium phosphates 280-160-250 mg packet 2 packet (has no administration in time range)   potassium, sodium phosphates 280-160-250 mg packet 2 packet (has no administration in time range)   potassium, sodium phosphates 280-160-250 mg packet 2 packet (has no administration in time range)   glucose chewable tablet 16 g (has no administration in time range)   glucose chewable tablet 24 g (has no administration in time range)   glucagon (human recombinant) injection 1 mg (has no administration in time range)   enoxaparin injection 40 mg (has no  administration in time range)   insulin aspart U-100 pen 0-5 Units (1 Units Subcutaneous Given 5/1/24 2015)   dextrose 10% bolus 125 mL 125 mL (has no administration in time range)   dextrose 10% bolus 250 mL 250 mL (has no administration in time range)   albuterol-ipratropium 2.5 mg-0.5 mg/3 mL nebulizer solution 3 mL (has no administration in time range)   guaiFENesin 100 mg/5 ml syrup 200 mg (200 mg Oral Given 5/1/24 2344)   albuterol-ipratropium 2.5 mg-0.5 mg/3 mL nebulizer solution 3 mL (3 mLs Nebulization Given 5/1/24 1000)   aspirin tablet 325 mg (325 mg Oral Given 5/1/24 1109)   sodium chloride 0.9% bolus 1,000 mL 1,000 mL (0 mLs Intravenous Stopped 5/1/24 1456)   cefTRIAXone (Rocephin) 1 g in dextrose 5 % in water (D5W) 100 mL IVPB (MB+) (0 g Intravenous Stopped 5/1/24 1139)   azithromycin (ZITHROMAX) 500 mg in dextrose 5 % (D5W) 250 mL IVPB (Vial-Mate) (0 mg Intravenous Stopped 5/1/24 1325)   potassium bicarbonate disintegrating tablet 20 mEq (20 mEq Oral Given 5/1/24 1314)   iohexoL (OMNIPAQUE 350) injection 100 mL (75 mLs Intravenous Given 5/1/24 1406)   predniSONE tablet 60 mg (60 mg Oral Given 5/1/24 1655)   albuterol-ipratropium 2.5 mg-0.5 mg/3 mL nebulizer solution 3 mL (3 mLs Nebulization Given 5/1/24 1637)   diphenhydrAMINE capsule 25 mg (25 mg Oral Given 5/2/24 0152)     Medical Decision Making  Amount and/or Complexity of Data Reviewed  Labs: ordered. Decision-making details documented in ED Course.  Radiology: ordered. Decision-making details documented in ED Course.  ECG/medicine tests: ordered. Decision-making details documented in ED Course.     Details: See Dr. Reid's interpretation     Risk  OTC drugs.  Prescription drug management.  Decision regarding hospitalization.        Medical Decision Making:    This is an evaluation of a 73 y.o. male that presents to the Emergency Department for   Chief Complaint   Patient presents with    Shortness of Breath     A 74 y/o male presents to ER c/o  SOB accompanied with cough x 1 month. Pt was Dx with Pneumonia and prescribed medication one month prior. Pt still having SOB and productive cough with white sputum.        The patient is a non-toxic and well appearing patient. On physical exam, patient appears well hydrated with moist mucus membranes. Regular rate and rhythm. No murmurs. Abdomen soft and non tender. Patient is tolerating PO without difficulty. Physical exam otherwise as above.     I have reviewed vital signs and nursing notes.   Vital Signs Are Reassuring.     Based on the patient's symptoms, I am considering and evaluating for the following differential diagnoses:  Hypoxia, respiratory failure, dyspnea on exertion, pneumonia, sepsis, EKATERINA, hyperglycemia, and DKA.    Consider hospitalization for: SOB, hypoxia    Patient is agreeable to transfer and admission to [facility]    ED Course:Treatment in the ED included Physical Exam and medications given in ED  Medications   amLODIPine tablet 5 mg (has no administration in time range)   aspirin chewable tablet 81 mg (has no administration in time range)   atorvastatin tablet 40 mg (40 mg Oral Given 5/1/24 2015)   carvediloL tablet 25 mg (25 mg Oral Given 5/1/24 2015)   latanoprost 0.005 % ophthalmic solution 1 drop (1 drop Both Eyes Given 5/1/24 4141)   montelukast tablet 10 mg (10 mg Oral Given 5/1/24 2015)   tamsulosin 24 hr capsule 0.4 mg (has no administration in time range)   sodium chloride 0.9% flush 10 mL (has no administration in time range)   albuterol-ipratropium 2.5 mg-0.5 mg/3 mL nebulizer solution 3 mL (has no administration in time range)   melatonin tablet 6 mg (has no administration in time range)   ondansetron injection 4 mg (has no administration in time range)   prochlorperazine injection Soln 5 mg (has no administration in time range)   polyethylene glycol packet 17 g (has no administration in time range)   bisacodyL suppository 10 mg (has no administration in time range)   acetaminophen  tablet 650 mg (has no administration in time range)   simethicone chewable tablet 80 mg (has no administration in time range)   aluminum-magnesium hydroxide-simethicone 200-200-20 mg/5 mL suspension 30 mL (has no administration in time range)   acetaminophen tablet 650 mg (650 mg Oral Given 5/2/24 0151)   naloxone 0.4 mg/mL injection 0.02 mg (has no administration in time range)   potassium bicarbonate disintegrating tablet 50 mEq (has no administration in time range)   potassium bicarbonate disintegrating tablet 35 mEq (has no administration in time range)   potassium bicarbonate disintegrating tablet 60 mEq (has no administration in time range)   magnesium oxide tablet 800 mg (has no administration in time range)   magnesium oxide tablet 800 mg (has no administration in time range)   potassium, sodium phosphates 280-160-250 mg packet 2 packet (has no administration in time range)   potassium, sodium phosphates 280-160-250 mg packet 2 packet (has no administration in time range)   potassium, sodium phosphates 280-160-250 mg packet 2 packet (has no administration in time range)   glucose chewable tablet 16 g (has no administration in time range)   glucose chewable tablet 24 g (has no administration in time range)   glucagon (human recombinant) injection 1 mg (has no administration in time range)   enoxaparin injection 40 mg (has no administration in time range)   insulin aspart U-100 pen 0-5 Units (1 Units Subcutaneous Given 5/1/24 2015)   dextrose 10% bolus 125 mL 125 mL (has no administration in time range)   dextrose 10% bolus 250 mL 250 mL (has no administration in time range)   albuterol-ipratropium 2.5 mg-0.5 mg/3 mL nebulizer solution 3 mL (has no administration in time range)   guaiFENesin 100 mg/5 ml syrup 200 mg (200 mg Oral Given 5/1/24 2344)   albuterol-ipratropium 2.5 mg-0.5 mg/3 mL nebulizer solution 3 mL (3 mLs Nebulization Given 5/1/24 1000)   aspirin tablet 325 mg (325 mg Oral Given 5/1/24 1109)    sodium chloride 0.9% bolus 1,000 mL 1,000 mL (0 mLs Intravenous Stopped 5/1/24 1456)   cefTRIAXone (Rocephin) 1 g in dextrose 5 % in water (D5W) 100 mL IVPB (MB+) (0 g Intravenous Stopped 5/1/24 1139)   azithromycin (ZITHROMAX) 500 mg in dextrose 5 % (D5W) 250 mL IVPB (Vial-Mate) (0 mg Intravenous Stopped 5/1/24 1325)   potassium bicarbonate disintegrating tablet 20 mEq (20 mEq Oral Given 5/1/24 1314)   iohexoL (OMNIPAQUE 350) injection 100 mL (75 mLs Intravenous Given 5/1/24 1406)   predniSONE tablet 60 mg (60 mg Oral Given 5/1/24 1655)   albuterol-ipratropium 2.5 mg-0.5 mg/3 mL nebulizer solution 3 mL (3 mLs Nebulization Given 5/1/24 1637)   diphenhydrAMINE capsule 25 mg (25 mg Oral Given 5/2/24 0152)   .   Patient reports feeling better after treatment in the ER.       External Data/Documents Reviewed: Previous medical records and vital signs reviewed, see HPI and Physical exam.   Labs: ordered and reviewed. .  Radiology: ordered as indicated and reviewed.   ECG/medicine tests: ordered and independent interpretation performed by Dr. Ivette Reid DO. Decision-making details documented in ED Course.   Cardiac monitor placed for sob. Monitor shows Normal Sinus Rhythm with  rate of 82. Interpreted by Dr. Ivette Reid DO.    Risk  Diagnosis or treatment significantly limited by the following social determinants of health: Body mass index is 22.19 kg/m².     In shared decision making with the patient, we discussed treatment, prescriptions, labs, and imaging results.  Consulted pulmonology.  Pulmonology will see patient once he is admitted.  I contacted  at time 3:20, requesting consult with Hospitalist for admission. Requesting consultation with Dr. Pelaez for services not available at this facility.   Discussed patient's presentation, past medical history, physical exam, labs, radiology results, vital signs, and ED course.      Patient accepted by Dr Montgomery for transfer and admission at time 4:33pm   At this time  patient will be transferred & admitted.  Patient will be transferred via EMS to accepting facility.      At time of admission patient is awake alert oriented x4 speaking clearly in full sentences and moving all 4 extremities.     The following labs and imaging were reviewed:        Admission on 05/01/2024   Component Date Value Ref Range Status    POCT Glucose 05/01/2024 236 (H)  70 - 110 mg/dL Final    Blood Culture, Routine 05/01/2024 No Growth to date   Preliminary    Blood Culture, Routine 05/01/2024 No Growth to date   Preliminary    QRS Duration 05/01/2024 88  ms Final    [facility] QTC Calculation 05/01/2024 436  ms Final    Procalcitonin 05/01/2024 0.07  <0.25 ng/mL Final    Comment: A concentration < 0.25 ng/mL represents a low risk of bacterial   infection.  Procalcitonin may not be accurate among patients with localized   infection, recent trauma or major surgery, immunosuppressed state,   invasive fungal infection, renal dysfunction. Decisions regarding   initiation or continuation of antibiotic therapy should not be based   solely on procalcitonin levels.      Glucose, UA 05/01/2024 2+ (A)   Final    Bilirubin, UA 05/01/2024 Negative   Final    Ketones, UA 05/01/2024 Negative   Final    Spec Grav UA 05/01/2024 1.025   Final    Blood, UA 05/01/2024 Trace-intact (A)   Final    PH, UA 05/01/2024 7.0   Final    Protein, UA 05/01/2024 3+ (A)   Final    Urobilinogen, UA 05/01/2024 0.2  E.U./dL Final    Nitrite, UA 05/01/2024 Negative   Final    Leukocytes, UA 05/01/2024 Negative   Final    Color, UA 05/01/2024 Dark yellow   Final    Clarity, UA 05/01/2024 Clear   Final    Influenza B Ag 05/01/2024 negative  Positive/Negative Final    Inflenza A Ag 05/01/2024 negative  Positive/Negative Final    POC PH 05/01/2024 7.338 (L)  7.35 - 7.45 Final    POC PCO2 05/01/2024 55.0 (H)  35 - 45 mmHg Final    POC PO2 05/01/2024 15 (LL)  40 - 60 mmHg Final    POC HCO3 05/01/2024 29.5 (H)  24 - 28 mmol/L Final    POC BE 05/01/2024 2   -2 to 2 mmol/L Final    POC SATURATED O2 05/01/2024 16  95 - 100 % Final    POC Lactate 05/01/2024 2.79 (H)  0.5 - 2.2 mmol/L Final    POC TCO2 05/01/2024 31 (H)  24 - 29 mmol/L Final    Sample 05/01/2024 VENOUS   Final    Site 05/01/2024 Other   Final    Allens Test 05/01/2024 N/A   Final    Albumin, POC 05/01/2024 3.8  3.3 - 5.5 g/dL Final    Alkaline Phosphatase, POC 05/01/2024 66  42 - 141 U/L Final    ALT (SGPT), POC 05/01/2024 15  10 - 47 U/L Final    AST (SGOT), POC 05/01/2024 24  11 - 38 U/L Final    POC BUN 05/01/2024 13  7 - 22 mg/dL Final    Calcium, POC 05/01/2024 10.5 (H)  8.0 - 10.3 mg/dL Final    POC Chloride 05/01/2024 97 (L)  98 - 108 mmol/L Final    POC Creatinine 05/01/2024 0.8  0.6 - 1.2 mg/dL Final    POC Glucose 05/01/2024 275 (H)  73 - 118 mg/dL Final    POC Potassium 05/01/2024 3.4 (L)  3.6 - 5.1 mmol/L Final    POC Sodium 05/01/2024 139  128 - 145 mmol/L Final    Bilirubin, POC 05/01/2024 0.9  0.2 - 1.6 mg/dL Final    POC TCO2 05/01/2024 31  18 - 33 mmol/L Final    Protein, POC 05/01/2024 8.6 (H)  6.4 - 8.1 g/dL Final    POC Cardiac Troponin I 05/01/2024 0.00  0.00 - 0.08 ng/mL Final    Sample 05/01/2024 unknown   Final    Comment: A single negative troponin is insufficient to rule out myocardial infarction.  The use of a serial sampling protocol is recommended practice. Correlate results with reference intervals established for methodology used. Point of care and core laboratory   troponin results are not interchangeable.      POC D-DI 05/01/2024 <100  0.0 - 450.0 ng/mL Final    POC B-Type Natriuretic Peptide 05/01/2024 82.9  0.0 - 100.0 pg/mL Final    POC PH 05/01/2024 7.424  7.35 - 7.45 Final    POC PCO2 05/01/2024 36.7  35 - 45 mmHg Final    POC PO2 05/01/2024 71 (L)  80 - 100 mmHg Final    POC HCO3 05/01/2024 24.1  24 - 28 mmol/L Final    POC BE 05/01/2024 0  -2 to 2 mmol/L Final    POC SATURATED O2 05/01/2024 95  95 - 100 % Final    POC pH Temp 05/01/2024 7.424   Final    POC pCO2  Temp 05/01/2024 36.7  mmHg Final    POC pO2 Temp 05/01/2024 71  mmHg Final    POC Lactate 05/01/2024 2.93 (H)  0.36 - 1.25 mmol/L Final    POC TCO2 05/01/2024 25  23 - 27 mmol/L Final    Sample 05/01/2024 ARTERIAL   Final    Site 05/01/2024 LR   Final    Allens Test 05/01/2024 Pass   Final    DelSys 05/01/2024 Room Air   Final    POC Temp 05/01/2024 37.0 C   Final    POCT Glucose 05/01/2024 136 (H)  70 - 110 mg/dL Final    POC PH 05/01/2024 7.395  7.35 - 7.45 Final    POC PCO2 05/01/2024 50.2 (H)  35 - 45 mmHg Final    POC PO2 05/01/2024 25 (LL)  40 - 60 mmHg Final    POC HCO3 05/01/2024 30.7 (H)  24 - 28 mmol/L Final    POC BE 05/01/2024 5 (H)  -2 to 2 mmol/L Final    POC SATURATED O2 05/01/2024 45  95 - 100 % Final    POC Lactate 05/01/2024 2.04  0.5 - 2.2 mmol/L Final    POC TCO2 05/01/2024 32 (H)  24 - 29 mmol/L Final    Sample 05/01/2024 VENOUS   Final    Site 05/01/2024 Other   Final    Allens Test 05/01/2024 N/A   Final    POCT Glucose 05/01/2024 215 (H)  70 - 110 mg/dL Final    WBC 05/01/2024 8.67  3.90 - 12.70 K/uL Final    RBC 05/01/2024 4.87  4.60 - 6.20 M/uL Final    Hemoglobin 05/01/2024 13.9 (L)  14.0 - 18.0 g/dL Final    Hematocrit 05/01/2024 40.1  40.0 - 54.0 % Final    MCV 05/01/2024 82  82 - 98 fL Final    MCH 05/01/2024 28.5  27.0 - 31.0 pg Final    MCHC 05/01/2024 34.7  32.0 - 36.0 g/dL Final    RDW 05/01/2024 13.9  11.5 - 14.5 % Final    Platelets 05/01/2024 198  150 - 450 K/uL Final    MPV 05/01/2024 9.8  9.2 - 12.9 fL Final    Immature Granulocytes 05/01/2024 0.2  0.0 - 0.5 % Final    Gran # (ANC) 05/01/2024 7.1  1.8 - 7.7 K/uL Final    Immature Grans (Abs) 05/01/2024 0.02  0.00 - 0.04 K/uL Final    Comment: Mild elevation in immature granulocytes is non specific and   can be seen in a variety of conditions including stress response,   acute inflammation, trauma and pregnancy. Correlation with other   laboratory and clinical findings is essential.      Lymph # 05/01/2024 1.1  1.0 - 4.8 K/uL  Final    Mono # 05/01/2024 0.2 (L)  0.3 - 1.0 K/uL Final    Eos # 05/01/2024 0.3  0.0 - 0.5 K/uL Final    Baso # 05/01/2024 0.01  0.00 - 0.20 K/uL Final    nRBC 05/01/2024 0  0 /100 WBC Final    Gran % 05/01/2024 82.1 (H)  38.0 - 73.0 % Final    Lymph % 05/01/2024 12.5 (L)  18.0 - 48.0 % Final    Mono % 05/01/2024 2.2 (L)  4.0 - 15.0 % Final    Eosinophil % 05/01/2024 2.9  0.0 - 8.0 % Final    Basophil % 05/01/2024 0.1  0.0 - 1.9 % Final    Differential Method 05/01/2024 Automated   Final    Sodium 05/01/2024 139  136 - 145 mmol/L Final    Potassium 05/01/2024 4.3  3.5 - 5.1 mmol/L Final    Chloride 05/01/2024 100  95 - 110 mmol/L Final    CO2 05/01/2024 24  23 - 29 mmol/L Final    Glucose 05/01/2024 306 (H)  70 - 110 mg/dL Final    BUN 05/01/2024 13  8 - 23 mg/dL Final    Creatinine 05/01/2024 1.2  0.5 - 1.4 mg/dL Final    Calcium 05/01/2024 10.0  8.7 - 10.5 mg/dL Final    Total Protein 05/01/2024 7.7  6.0 - 8.4 g/dL Final    Albumin 05/01/2024 3.6  3.5 - 5.2 g/dL Final    Total Bilirubin 05/01/2024 0.5  0.1 - 1.0 mg/dL Final    Comment: For infants and newborns, interpretation of results should be based  on gestational age, weight and in agreement with clinical  observations.    Premature Infant recommended reference ranges:  Up to 24 hours.............<8.0 mg/dL  Up to 48 hours............<12.0 mg/dL  3-5 days..................<15.0 mg/dL  6-29 days.................<15.0 mg/dL      Alkaline Phosphatase 05/01/2024 59  55 - 135 U/L Final    AST 05/01/2024 22  10 - 40 U/L Final    ALT 05/01/2024 17  10 - 44 U/L Final    eGFR 05/01/2024 >60  >60 mL/min/1.73 m^2 Final    Anion Gap 05/01/2024 15  8 - 16 mmol/L Final    Magnesium 05/01/2024 1.3 (L)  1.6 - 2.6 mg/dL Final    Phosphorus 05/01/2024 2.3 (L)  2.7 - 4.5 mg/dL Final    BNP 05/01/2024 65  0 - 99 pg/mL Final    Values of less than 100 pg/ml are consistent with non-CHF populations.    Procalcitonin 05/01/2024 0.09  <0.25 ng/mL Final    Comment: A concentration <  0.25 ng/mL represents a low risk of bacterial   infection.  Procalcitonin may not be accurate among patients with localized   infection, recent trauma or major surgery, immunosuppressed state,   invasive fungal infection, renal dysfunction. Decisions regarding   initiation or continuation of antibiotic therapy should not be based   solely on procalcitonin levels.      RSV Source 05/01/2024 Nasopharyngeal Swab   Final    RSV Ag by Molecular Method 05/01/2024 Negative  Negative Final    SARS-CoV-2 RNA, Amplification, Qual 05/01/2024 Negative  Negative Final    Comment: This test utilizes isothermal nucleic acid amplification technology   to   detect the SARS-CoV-2 RdRp nucleic acid segment. The analytical   sensitivity   (limit of detection) is 500 copies/swab.    A POSITIVE result is indicative of the presence of SARS-CoV-2 RNA;   clinical   correlation with patient history and other diagnostic information is   necessary to determine patient infection status.    A NEGATIVE result means that SARS-CoV-2 nucleic acids are not present   above   the limit of detection. A NEGATIVE result should be treated as   presumptive.   It does not rule out the possibility of COVID-19 and should not be   the sole   basis for treatment decisions.    If COVID-19 is strongly suspected based on clinical and exposure   history,   re-testing using an alternate molecular assay should be considered.    This test is Food and Drug Administration (FDA) approved. Performance   characteristics of this has been independently verified by [facility] Department of [facility]                           hology and Laboratory Medicine.          Imaging Results              CTA Chest Non-Coronary (PE Studies) (Final result)  Result time 05/01/24 14:48:59      Final result by Isiah Barreto MD (05/01/24 14:48:59)                   Impression:      1. Study considered diagnostic to the proximal segmental pulmonary arterial level.  No convincing  evidence of acute pulmonary embolism.  2. Redemonstrated findings in keeping with interstitial lung disease.  Possible UIP pattern.  Additional differential considerations would include fibrotic hypersensitivity pneumonitis or NSIP pattern, among others.  Overall, allowing for differences in technique, no significant progression the fibrotic component of these findings.  Slightly increased conspicuity of ground-glass type attenuation could relate to subsegmental atelectasis and or edema in the appropriate clinical context.  Pulmonary medicine consultation may be of benefit.  3. Additional details, as provided in the body of report.      Electronically signed by: Isiah Barreto  Date:    05/01/2024  Time:    14:48               Narrative:    EXAMINATION:  CTA CHEST NON CORONARY (PE STUDIES)    CLINICAL HISTORY:  Pulmonary embolism (PE) suspected, high prob;    TECHNIQUE:  Low dose axial images, sagittal and coronal reformations were obtained from the thoracic inlet to the lung bases following the IV administration of 75 mL of Omnipaque 350.  Contrast timing was optimized to evaluate the pulmonary arteries.  MIP images were performed.    COMPARISON:  CTA chest performed 07/07/2023.    FINDINGS:  Exam quality: Study considered diagnostic to the proximal segmental pulmonary arterial level.    Pulmonary embolism: No acute or chronic pulmonary embolism.    Central pulmonary arteries: Normal caliber.    Aorta: Normal caliber.    Heart: No right heart strain. Normal size.    Coronary arteries: Moderate calcifications and/or stenting    Pericardium: Normal. No effusion, thickening, or calcification.    Support tubes and lines: None.    Base of neck/thyroid: Normal.    Lymph nodes: Mildly enlarged right hilar node measuring 11 mm short axis (series 2, image 107).    Esophagus: Normal.    Pleura: No effusion, thickening, or calcification.    Upper abdomen: Unremarkable    Body wall: Unremarkable    Airways: Central airways  appear patent.  Lower lobe predominant traction bronchiectasis.    Lungs: Assessment compromised by motion.  Findings in keeping with interstitial lung disease again noted.  There is bilateral reticulation and architectural distortion.  Suggested somewhat lower lung zone and subpleural predominance.  Subpleural cystic change noted.  Somewhat limited comparison to prior CTA chest of 07/07/2023 given differences in technique and phase of respiration.  Slightly increased ground-glass type attenuation on the current study may reflect subsegmental atelectasis and/or edema.  No definite significantly progressed fibrotic change since the prior study.    Bones: No definite acute change.  Osseous demineralization.  Question diffuse idiopathic skeletal hyperostosis.  Moderate compression deformity of the T6 vertebra, similar configuration to the 07/07/2023 CT.                                       US Lower Extremity Veins Bilateral (Final result)  Result time 05/01/24 11:47:24      Final result by Noble Collins MD (05/01/24 11:47:24)                   Impression:      No evidence of deep venous thrombosis in either lower extremity.      Electronically signed by: Noble Collins MD  Date:    05/01/2024  Time:    11:47               Narrative:    EXAMINATION:  US LOWER EXTREMITY VEINS BILATERAL    CLINICAL HISTORY:  Other specified soft tissue disorders    TECHNIQUE:  Duplex and color flow Doppler and dynamic compression was performed of the bilateral lower extremity veins was performed.    COMPARISON:  None    FINDINGS:  Right thigh veins: The common femoral, femoral, popliteal, upper greater saphenous, and deep femoral veins are patent and free of thrombus. The veins are normally compressible and have normal phasic flow and augmentation response.    Right calf veins: The visualized calf veins are patent.    Left thigh veins: The common femoral, femoral, popliteal, upper greater saphenous, and deep femoral veins are patent and  free of thrombus. The veins are normally compressible and have normal phasic flow and augmentation response.    Left calf veins: The visualized calf veins are patent.    Miscellaneous: None                                       X-Ray Chest PA And Lateral (Final result)  Result time 05/01/24 10:50:47      Final result by Yasmine Acosta MD (05/01/24 10:50:47)                   Impression:      Chronic appearing lung findings, no definite superimpose acute process seen.      Electronically signed by: Yasmine Acosta MD  Date:    05/01/2024  Time:    10:50               Narrative:    EXAMINATION:  XR CHEST PA AND LATERAL    CLINICAL HISTORY:  Cough;    TECHNIQUE:  PA and lateral views of the chest were performed.    COMPARISON:  04/20/2024    FINDINGS:  The cardiac silhouette is normal in size, midline.    The pulmonary vascularity is normal.    Abnormal increased reticular lung markings noted bilaterally.    Low lung volume, unchanged.    No superimposed acute focal area of airspace consolidation    No pleural effusion, no pneumothorax.    The osseous structures appear within normal limits for age.                                              Scribe Attestation:   Scribe #1: I performed the above scribed service and the documentation accurately describes the services I performed. I attest to the accuracy of the note.        ED Course as of 05/02/24 0654   Wed May 01, 2024   1530 Consulted pulmonology Dr. Morales.  Awaiting recommendations [RF]      ED Course User Index  [RF] Ivette Reid DO                        I, Dr. Ivette Reid, personally performed the services described in this documentation. This document was produced by a scribe under my direction and in my presence. All medical record entries made by the scribe were at my direction and in my presence.  I have reviewed the chart and agree that the record reflects my personal performance and is accurate and complete. Ivette Reid DO.      05/02/2024 6:54 AM      Clinical Impression:  Final diagnoses:  [R06.02] SOB (shortness of breath)  [R09.02] Hypoxia (Primary)  [R06.09] Dyspnea on exertion  [R73.9] Hyperglycemia  [E87.6] Hypokalemia          ED Disposition Condition    Admit                 Ivette Reid DO  05/02/24 0648

## 2024-05-01 NOTE — ADMISSIONCARE
AdmissionCare    Guideline: Pneumonia - INPT, Inpatient    Based on the indications selected for the patient, the bed status of Inpatient was determined to be MET    The following indications were selected as present at the time of evaluation of the patient:      - Hypoxemia, as indicated by 1 or more of the following:    - Patient without baseline need for supplemental oxygen with oxygen saturation (SaO2) of less than 90% or arterial blood gas partial pressure of oxygen (PO2) of less than 60 mm Hg (8.0 kPa) on room air    AdmissionCare documentation entered by: Roel Pelaez    [facility], 27th edition, Copyright © 2023 [facility], [facility] All Rights Reserved.  [id number]:05-05:00

## 2024-05-02 ENCOUNTER — TELEPHONE (OUTPATIENT)
Dept: PULMONOLOGY | Facility: CLINIC | Age: 74
End: 2024-05-02
Payer: MEDICARE

## 2024-05-02 VITALS
DIASTOLIC BLOOD PRESSURE: 95 MMHG | HEIGHT: 68 IN | WEIGHT: 143.94 LBS | BODY MASS INDEX: 21.81 KG/M2 | HEART RATE: 86 BPM | OXYGEN SATURATION: 96 % | SYSTOLIC BLOOD PRESSURE: 170 MMHG | TEMPERATURE: 99 F | RESPIRATION RATE: 20 BRPM

## 2024-05-02 DIAGNOSIS — R06.02 SOB (SHORTNESS OF BREATH): Primary | ICD-10-CM

## 2024-05-02 LAB
ADENOVIRUS: NOT DETECTED
BORDETELLA PARAPERTUSSIS (IS1001): NOT DETECTED
BORDETELLA PERTUSSIS (PTXP): NOT DETECTED
CHLAMYDIA PNEUMONIAE: NOT DETECTED
CORONAVIRUS 229E, COMMON COLD VIRUS: NOT DETECTED
CORONAVIRUS HKU1, COMMON COLD VIRUS: NOT DETECTED
CORONAVIRUS NL63, COMMON COLD VIRUS: NOT DETECTED
CORONAVIRUS OC43, COMMON COLD VIRUS: NOT DETECTED
FLUBV RNA NPH QL NAA+NON-PROBE: NOT DETECTED
HPIV1 RNA NPH QL NAA+NON-PROBE: NOT DETECTED
HPIV2 RNA NPH QL NAA+NON-PROBE: NOT DETECTED
HPIV3 RNA NPH QL NAA+NON-PROBE: NOT DETECTED
HPIV4 RNA NPH QL NAA+NON-PROBE: NOT DETECTED
HUMAN METAPNEUMOVIRUS: NOT DETECTED
INFLUENZA A (SUBTYPES H1,H1-2009,H3): NOT DETECTED
MYCOPLASMA PNEUMONIAE: NOT DETECTED
POCT GLUCOSE: 284 MG/DL (ref 70–110)
POCT GLUCOSE: 309 MG/DL (ref 70–110)
RESPIRATORY INFECTION PANEL SOURCE: NORMAL
RSV AG SPEC QL IA: NEGATIVE
RSV RNA NPH QL NAA+NON-PROBE: NOT DETECTED
RV+EV RNA NPH QL NAA+NON-PROBE: NOT DETECTED
SARS-COV-2 RDRP RESP QL NAA+PROBE: NEGATIVE
SARS-COV-2 RNA RESP QL NAA+PROBE: NOT DETECTED
SPECIMEN SOURCE: NORMAL

## 2024-05-02 PROCEDURE — 99900031 HC PATIENT EDUCATION (STAT)

## 2024-05-02 PROCEDURE — 27000221 HC OXYGEN, UP TO 24 HOURS

## 2024-05-02 PROCEDURE — 25000003 PHARM REV CODE 250

## 2024-05-02 PROCEDURE — 94640 AIRWAY INHALATION TREATMENT: CPT

## 2024-05-02 PROCEDURE — 87798 DETECT AGENT NOS DNA AMP: CPT | Mod: 59 | Performed by: STUDENT IN AN ORGANIZED HEALTH CARE EDUCATION/TRAINING PROGRAM

## 2024-05-02 PROCEDURE — 25000003 PHARM REV CODE 250: Performed by: STUDENT IN AN ORGANIZED HEALTH CARE EDUCATION/TRAINING PROGRAM

## 2024-05-02 PROCEDURE — 25000003 PHARM REV CODE 250: Performed by: HOSPITALIST

## 2024-05-02 PROCEDURE — 87486 CHLMYD PNEUM DNA AMP PROBE: CPT | Performed by: STUDENT IN AN ORGANIZED HEALTH CARE EDUCATION/TRAINING PROGRAM

## 2024-05-02 PROCEDURE — 99223 1ST HOSP IP/OBS HIGH 75: CPT | Mod: ,,, | Performed by: STUDENT IN AN ORGANIZED HEALTH CARE EDUCATION/TRAINING PROGRAM

## 2024-05-02 PROCEDURE — 99900035 HC TECH TIME PER 15 MIN (STAT)

## 2024-05-02 PROCEDURE — 94761 N-INVAS EAR/PLS OXIMETRY MLT: CPT

## 2024-05-02 PROCEDURE — 63600175 PHARM REV CODE 636 W HCPCS: Performed by: HOSPITALIST

## 2024-05-02 PROCEDURE — 25000242 PHARM REV CODE 250 ALT 637 W/ HCPCS: Performed by: STUDENT IN AN ORGANIZED HEALTH CARE EDUCATION/TRAINING PROGRAM

## 2024-05-02 RX ORDER — TAMSULOSIN HYDROCHLORIDE 0.4 MG/1
0.4 CAPSULE ORAL DAILY
Start: 2024-05-02 | End: 2025-05-02

## 2024-05-02 RX ORDER — PREDNISONE 50 MG/1
50 TABLET ORAL DAILY
Qty: 10 TABLET | Refills: 0 | Status: SHIPPED | OUTPATIENT
Start: 2024-05-03 | End: 2024-05-13

## 2024-05-02 RX ORDER — DOXYCYCLINE HYCLATE 100 MG
100 TABLET ORAL EVERY 12 HOURS
Status: DISCONTINUED | OUTPATIENT
Start: 2024-05-02 | End: 2024-05-02 | Stop reason: HOSPADM

## 2024-05-02 RX ORDER — DIPHENHYDRAMINE HCL 25 MG
25 CAPSULE ORAL ONCE
Status: COMPLETED | OUTPATIENT
Start: 2024-05-02 | End: 2024-05-02

## 2024-05-02 RX ORDER — PREDNISONE 10 MG/1
10 TABLET ORAL DAILY
Qty: 10 TABLET | Refills: 0 | Status: SHIPPED | OUTPATIENT
Start: 2024-05-23 | End: 2024-06-02

## 2024-05-02 RX ORDER — PREDNISONE 50 MG/1
50 TABLET ORAL DAILY
Status: DISCONTINUED | OUTPATIENT
Start: 2024-05-02 | End: 2024-05-02 | Stop reason: HOSPADM

## 2024-05-02 RX ORDER — PREDNISONE 20 MG/1
20 TABLET ORAL DAILY
Qty: 10 TABLET | Refills: 0 | Status: SHIPPED | OUTPATIENT
Start: 2024-05-13 | End: 2024-05-23

## 2024-05-02 RX ADMIN — INSULIN ASPART 3 UNITS: 100 INJECTION, SOLUTION INTRAVENOUS; SUBCUTANEOUS at 09:05

## 2024-05-02 RX ADMIN — AMLODIPINE BESYLATE 5 MG: 5 TABLET ORAL at 09:05

## 2024-05-02 RX ADMIN — ACETAMINOPHEN 650 MG: 325 TABLET ORAL at 01:05

## 2024-05-02 RX ADMIN — POLYETHYLENE GLYCOL 3350 17 G: 17 POWDER, FOR SOLUTION ORAL at 09:05

## 2024-05-02 RX ADMIN — DIPHENHYDRAMINE HYDROCHLORIDE 25 MG: 25 CAPSULE ORAL at 01:05

## 2024-05-02 RX ADMIN — DOXYCYCLINE HYCLATE 100 MG: 100 TABLET, COATED ORAL at 09:05

## 2024-05-02 RX ADMIN — CARVEDILOL 25 MG: 12.5 TABLET, FILM COATED ORAL at 09:05

## 2024-05-02 RX ADMIN — IPRATROPIUM BROMIDE AND ALBUTEROL SULFATE 3 ML: 2.5; .5 SOLUTION RESPIRATORY (INHALATION) at 12:05

## 2024-05-02 RX ADMIN — ASPIRIN 81 MG CHEWABLE TABLET 81 MG: 81 TABLET CHEWABLE at 09:05

## 2024-05-02 RX ADMIN — TAMSULOSIN HYDROCHLORIDE 0.4 MG: 0.4 CAPSULE ORAL at 09:05

## 2024-05-02 RX ADMIN — PREDNISONE 50 MG: 50 TABLET ORAL at 09:05

## 2024-05-02 RX ADMIN — IPRATROPIUM BROMIDE AND ALBUTEROL SULFATE 3 ML: 2.5; .5 SOLUTION RESPIRATORY (INHALATION) at 07:05

## 2024-05-02 NOTE — HOSPITAL COURSE
This is a 73-year-old male with a past medical history of ILD, CAD, HFrEF (EF: 40%, GIDD), BPH, type 2 diabetes, who presents with shortness of breath.    In the ED, the patient was hypoxic (SpO2:  88%) requiring 2 L O2.  Labs were remarkable for an elevated lactic acid (2.9 > 2.0), negative BNP (82), hyperglycemia (275), hypokalemia (3.4).  VBG showed a pH of 7.39, pCO2 of 50.2.  CTA chest showed no evidence of PE (however, study was limited), re demonstrating findings of ILD, without significant progression and slightly increased conspicuity of ground-glass type attenuation (atelectasis versus edema).  Patient was given 1 L of NS, prednisone 60 mg p.o., potassium bicarbonate 20 mEq, ceftriaxone, azithromycin, aspirin 325 mg p.o., DuoNeb x2. Patient was seen by pulmonology,recommended out patient taper prednisone,patient was discharged home with oxygen taper prednisone and  follow up with Pulmonology as out patient.

## 2024-05-02 NOTE — PLAN OF CARE
05/02/24 1056   Medicare Message   Important Message from Medicare regarding Discharge Appeal Rights Given to patient/caregiver;Explained to patient/caregiver;Signed/date by patient/caregiver   Date IMM was signed 05/02/24   Time IMM was signed 1046

## 2024-05-02 NOTE — ASSESSMENT & PLAN NOTE
Unknown ILD.  CT with interlobular septal thickening, honeycombing in the upper lobes with peripheral reticulations and mild bronchiolectasis.  Has not undergone bronchoscopy.  Previously followed by Pulmonary at [facility].  Initially attributed to chronic aspiration given temporal relationship between cough and eating, however most recent EGD unremarkable.  Currently on famotidine 20 mg BID.  Eos 700 recently.  CTD-ILD workup negative with the exception of mildly elevated centromere B antibody.  Per patient, previous pulmonologist was working on Ofev but was unable to get approved.  He would like to follow-up with another pulmonologist.  In the interim, PCT is negative x2 and BNP minimally elevated.  Placed on 60 mg of prednisone with improvement to room air over 24 hours.  Recommend tapering prednisone over the next week or so. I message schedule her about getting him into [facility], as there still seem to be some questions regarding his ILD.

## 2024-05-02 NOTE — NURSING
[facility] [place]  Nurses Note -- 4 Eyes      5/2/2024       Skin assessed on: Q Shift      [x] No Pressure Injuries Present    [x]Prevention Measures Documented    [] Yes LDA  for Pressure Injury Previously documented     [] Yes New Pressure Injury Discovered   [] LDA for New Pressure Injury Added      Attending RN:  Irish Serrano LPN     Second RN:  Taryn

## 2024-05-02 NOTE — PLAN OF CARE
Case Management Final Discharge Note      Discharge Disposition: home     New DME ordered / company name: [employer] ( [employer])    Relevant SDOH / Transition of Care Barriers:  none    Primary Caretaker and contact information: Emmanuel Grant Jr (Son)  [phone] (Mobile)     Scheduled followup appointment: PCP    Referrals placed: none    Transportation: Public transportation    Patient and family educated on discharge services and updated on DC plan. Bedside RN notified, patient clear to discharge from Case Management Perspective.      05/02/24 1255   Final Note   Assessment Type Final Discharge Note   Anticipated Discharge Disposition Home   What phone number can be called within the next 1-3 days to see how you are doing after discharge? [phone]   Hospital Resources/Appts/Education Provided Appointments scheduled and added to AVS;Post-Acute resouces added to AVS   Post-Acute Status   Coverage PHN   Discharge Delays None known at this time

## 2024-05-02 NOTE — NURSING
Testing for Home O2    O2 Sats on RA at rest= 96%    O2 sats on RA with exercise= 86%    O2 sats on 2L O2 exercise = 94%

## 2024-05-02 NOTE — TELEPHONE ENCOUNTER
----- Message from Jayson Morales MD sent at 5/2/2024  1:52 PM CDT -----  Regarding: hospital follow up  Mikayla, can we please get this patient a 2 week follow-up with a provider in our clinic?  Thanks for your help.    Jayson

## 2024-05-02 NOTE — ASSESSMENT & PLAN NOTE
- Echo (2/19/2024):       Moderately decreased left ventricular systolic function.     Left ventricular ejection fraction is estimated at 40%.     Grade I diastolic dysfunction (abnormal relaxation filling pattern), normal to mildly elevated filling pressures.     RVSP could not be calculated due to incomplete tricuspid regurgitation velocity profile.     Structurally normal mitral valve.     Structurally normal trileaflet aortic valve.     Structurally normal tricuspid valve.     Mild aortic dilatation of the sinuses of valsalva 3.9cm.       Optimize volume status

## 2024-05-02 NOTE — ASSESSMENT & PLAN NOTE
Does not appear to be in overt overload, however BNP around 80.  Certainly, his heart failure is not helping his chronic dyspnea.

## 2024-05-02 NOTE — H&P
[facility]  History & Physical    Patient Name: Emmanuel Grant  MRN: [id number]  Patient Class: IP- Inpatient  Admission Date: 5/1/2024  Attending Physician: Macario Montgomery MD   Primary Care Provider: Payam Vu Jr., NP         Patient information was obtained from patient and ER records.     Subjective:     Principal Problem:Acute hypoxic respiratory failure    Chief Complaint:   Chief Complaint   Patient presents with    Shortness of Breath     A 74 y/o male presents to ER c/o SOB accompanied with cough x 1 month. Pt was Dx with Pneumonia and prescribed medication one month prior. Pt still having SOB and productive cough with white sputum.         HPI: This is a 73-year-old male with a past medical history of ILD, CAD, HFrEF (EF: 40%, GIDD), BPH, type 2 diabetes, who presents with shortness of breath.     Patient had a recent hospitalization (4/20-4/21) after presenting with generalized weakness in the setting of hypoglycemia.  Since discharge, he reports progressively worsening shortness of breath and associated productive cough with white sputum.  He denies orthopnea or worsening lower extremity swelling.  Patient denies history of smoking.    In the ED, the patient was hypoxic (SpO2:  88%) requiring 2 L O2.  Labs were remarkable for an elevated lactic acid (2.9 > 2.0), negative BNP (82), hyperglycemia (275), hypokalemia (3.4).  VBG showed a pH of 7.39, pCO2 of 50.2.  CTA chest showed no evidence of PE (however, study was limited), re demonstrating findings of ILD, without significant progression and slightly increased conspicuity of ground-glass type attenuation (atelectasis versus edema).  Patient was given 1 L of NS, prednisone 60 mg p.o., potassium bicarbonate 20 mEq, ceftriaxone, azithromycin, aspirin 325 mg p.o., DuoNeb x2.  He was admitted for further management.    Past Medical History:   Diagnosis Date    Diabetes mellitus     Hypertension     Kidney stone     Stroke  "       Past Surgical History:   Procedure Laterality Date    COLONOSCOPY N/A 3/27/2024    Procedure: COLONOSCOPY;  Surgeon: Ariela Castro MD;  Location: [facility];  Service: Endoscopy;  Laterality: N/A;    ESOPHAGOGASTRODUODENOSCOPY N/A 3/27/2024    Procedure: EGD (ESOPHAGOGASTRODUODENOSCOPY);  Surgeon: Ariela Castro MD;  Location: [facility];  Service: Endoscopy;  Laterality: N/A;    SHOULDER SURGERY Right        Review of patient's allergies indicates:   Allergen Reactions    Dapagliflozin      Other reaction(s): Other (See Comments)    Pcn [penicillins]     Linagliptin Other (See Comments)     "it knocked me down", "it almost killed me"    Lisinopril Other (See Comments)     cough    Pantoprazole Hives       Current Facility-Administered Medications   Medication Dose Route Frequency Provider Last Rate Last Admin    0.9%  NaCl infusion  1,000 mL Intravenous Continuous Ivette Reid  mL/hr at 05/01/24 1132 1,000 mL at 05/01/24 1132    acetaminophen tablet 650 mg  650 mg Oral Q8H PRN Noe Rey MD        acetaminophen tablet 650 mg  650 mg Oral Q4H PRN Noe Rey MD        albuterol-ipratropium 2.5 mg-0.5 mg/3 mL nebulizer solution 3 mL  3 mL Nebulization Q4H PRN Noe Rey MD        aluminum-magnesium hydroxide-simethicone 200-200-20 mg/5 mL suspension 30 mL  30 mL Oral QID PRN Noe Rey MD        [START ON 5/2/2024] amLODIPine tablet 5 mg  5 mg Oral Daily Noe Rey MD        [START ON 5/2/2024] aspirin chewable tablet 81 mg  81 mg Oral Daily Noe Rey MD        atorvastatin tablet 40 mg  40 mg Oral QHS Noe Rey MD   40 mg at 05/01/24 2015    bisacodyL suppository 10 mg  10 mg Rectal Daily PRN Noe Rey MD        carvediloL tablet 25 mg  25 mg Oral BID WM Noe Rey MD   25 mg at 05/01/24 2015    dextrose 10% bolus 125 mL 125 mL  12.5 g Intravenous PRN Noe Rey MD        dextrose 10% bolus 250 mL 250 mL  25 g Intravenous PRN Noe Rey MD        " [START ON 5/2/2024] enoxaparin injection 40 mg  40 mg Subcutaneous Daily Noe Rey MD        glucagon (human recombinant) injection 1 mg  1 mg Intramuscular PRN Noe Rey MD        glucose chewable tablet 16 g  16 g Oral PRN Noe Rey MD        glucose chewable tablet 24 g  24 g Oral PRN Noe Rey MD        insulin aspart U-100 pen 0-5 Units  0-5 Units Subcutaneous QID (AC + HS) PRN Noe Rey MD   1 Units at 05/01/24 2015    latanoprost 0.005 % ophthalmic solution 1 drop  1 drop Both Eyes QHS Noe Rey MD        magnesium oxide tablet 800 mg  800 mg Oral PRN Noe Rey MD        magnesium oxide tablet 800 mg  800 mg Oral PRN Noe Rey MD        melatonin tablet 6 mg  6 mg Oral Nightly PRN Noe Rey MD        montelukast tablet 10 mg  10 mg Oral QHS Noe Rey MD   10 mg at 05/01/24 2015    naloxone 0.4 mg/mL injection 0.02 mg  0.02 mg Intravenous PRN Noe Rey MD        ondansetron injection 4 mg  4 mg Intravenous Q8H PRN Noe Rey MD        [START ON 5/2/2024] polyethylene glycol packet 17 g  17 g Oral Daily Noe Rey MD        potassium bicarbonate disintegrating tablet 35 mEq  35 mEq Oral PRN Noe Rey MD        potassium bicarbonate disintegrating tablet 50 mEq  50 mEq Oral PRN Noe Rey MD        potassium bicarbonate disintegrating tablet 60 mEq  60 mEq Oral PRN Noe Rey MD        potassium, sodium phosphates 280-160-250 mg packet 2 packet  2 packet Oral PRN Noe Rey MD        potassium, sodium phosphates 280-160-250 mg packet 2 packet  2 packet Oral PRN Noe Rey MD        potassium, sodium phosphates 280-160-250 mg packet 2 packet  2 packet Oral PRN Noe Rey MD        prochlorperazine injection Soln 5 mg  5 mg Intravenous Q6H PRN Noe Rey MD        simethicone chewable tablet 80 mg  1 tablet Oral QID PRN Noe Rey MD        sodium chloride 0.9% flush 10 mL  10 mL Intravenous Q12H PRN Noe Rey MD        [START ON  5/2/2024] tamsulosin 24 hr capsule 0.4 mg  0.4 mg Oral Daily Noe Rey MD         Family History       Problem Relation (Age of Onset)    Colon cancer Sister          Tobacco Use    Smoking status: Never     Passive exposure: Never    Smokeless tobacco: Never   Substance and Sexual Activity    Alcohol use: No    Drug use: Never    Sexual activity: Not Currently     Review of Systems   Constitutional: Negative.    HENT: Negative.     Eyes: Negative.    Respiratory:  Positive for cough and shortness of breath.    Cardiovascular: Negative.    Gastrointestinal: Negative.    Endocrine: Negative.    Genitourinary: Negative.    Musculoskeletal: Negative.    Skin: Negative.    Allergic/Immunologic: Negative.    Neurological: Negative.    Psychiatric/Behavioral: Negative.       Objective:     Vital Signs (Most Recent):  Temp: 97.5 °F (36.4 °C) (05/01/24 1958)  Pulse: 94 (05/01/24 1958)  Resp: (!) 21 (05/01/24 1958)  BP: (!) 161/97 (05/01/24 1958)  SpO2: (!) 92 % (05/01/24 1958) Vital Signs (24h Range):  Temp:  [97.5 °F (36.4 °C)-97.9 °F (36.6 °C)] 97.5 °F (36.4 °C)  Pulse:  [] 94  Resp:  [18-21] 21  SpO2:  [88 %-100 %] 92 %  BP: (137-174)/(81-97) 161/97     Weight: 66.2 kg (145 lb 15.1 oz)  Body mass index is 22.19 kg/m².     Physical Exam  Vitals and nursing note reviewed.   Constitutional:       General: He is not in acute distress.     Appearance: Normal appearance. He is not ill-appearing.   HENT:      Head: Normocephalic and atraumatic.      Nose: Nose normal.      Mouth/Throat:      Mouth: Mucous membranes are moist.   Eyes:      Extraocular Movements: Extraocular movements intact.   Cardiovascular:      Rate and Rhythm: Normal rate.      Pulses: Normal pulses.      Heart sounds: No murmur heard.  Pulmonary:      Effort: Pulmonary effort is normal. No respiratory distress.      Breath sounds: Decreased breath sounds present.   Abdominal:      General: Abdomen is flat.      Palpations: Abdomen is soft.       Tenderness: There is no abdominal tenderness.   Musculoskeletal:      Right lower leg: No edema.      Left lower leg: No edema.   Skin:     General: Skin is warm.      Capillary Refill: Capillary refill takes less than 2 seconds.   Neurological:      General: No focal deficit present.      Mental Status: He is alert.   Psychiatric:         Mood and Affect: Mood normal.                Significant Labs: All pertinent labs within the past 24 hours have been reviewed.    Significant Imaging: I have reviewed all pertinent imaging results/findings within the past 24 hours.  Assessment/Plan:     * Acute hypoxic respiratory failure  Patient with Hypoxic Respiratory failure which is Acute.  he is not on home oxygen. Supplemental oxygen was provided and noted-    Weaned off to room air on arrival to the floor   Could be in the setting of ILD/atelectasis.   Consult pulmonary     Chronic HFrEF (heart failure with reduced ejection fraction)  - Echo (2/19/2024):       Moderately decreased left ventricular systolic function.     Left ventricular ejection fraction is estimated at 40%.     Grade I diastolic dysfunction (abnormal relaxation filling pattern), normal to mildly elevated filling pressures.     RVSP could not be calculated due to incomplete tricuspid regurgitation velocity profile.     Structurally normal mitral valve.     Structurally normal trileaflet aortic valve.     Structurally normal tricuspid valve.     Mild aortic dilatation of the sinuses of valsalva 3.9cm.       Optimize volume status     Hyperlipidemia  Continue home medications       Essential hypertension  Continue home medications     Type 2 diabetes mellitus with hyperglycemia  Patient's FSGs are controlled on current medication regimen.  Last A1c reviewed-   Lab Results   Component Value Date    HGBA1C 7.0 (H) 02/18/2024     Most recent fingerstick glucose reviewed-   Recent Labs   Lab 05/01/24  0847 05/01/24  1621 05/01/24 2002   POCTGLUCOSE 236* 136*  215*     Current correctional scale  Low  Maintain anti-hyperglycemic dose as follows-   Antihyperglycemics (From admission, onward)      Start     Stop Route Frequency Ordered    05/01/24 2044  insulin aspart U-100 pen 0-5 Units         -- SubQ Before meals & nightly PRN 05/01/24 1944          Hold Oral hypoglycemics while patient is in the hospital.    BPH (benign prostatic hyperplasia)  Continue home medications       Coronary artery disease  Continue home medications     Interstitial lung disease  Consult pulmonary         VTE Risk Mitigation (From admission, onward)           Ordered     enoxaparin injection 40 mg  Daily         05/01/24 1944     IP VTE HIGH RISK PATIENT  Once         05/01/24 1944     Place sequential compression device  Until discontinued         05/01/24 1944                               AdmissionCare    Guideline: Pneumonia - INPT, Inpatient    Based on the indications selected for the patient, the bed status of Inpatient was determined to be MET    The following indications were selected as present at the time of evaluation of the patient:      - Hypoxemia, as indicated by 1 or more of the following:    - Patient without baseline need for supplemental oxygen with oxygen saturation (SaO2) of less than 90% or arterial blood gas partial pressure of oxygen (PO2) of less than 60 mm Hg (8.0 kPa) on room air    AdmissionCare documentation entered by: Roel Pelaez    [facility] [employer], 27th edition, Copyright © 2023 [facility] [employer], [employer] All Rights Reserved.  [id number]:00    Noe Rey MD  Department of Hospital Medicine  [facility]

## 2024-05-02 NOTE — PLAN OF CARE
Problem: Adult Inpatient Plan of Care  Goal: Plan of Care Review  5/2/2024 1731 by Arcelia Torres RN  Outcome: Adequate for Care Transition  5/2/2024 1730 by Arcelia Torres RN  Outcome: Progressing  5/2/2024 1243 by Arcelia Torres RN  Outcome: Progressing  Goal: Patient-Specific Goal (Individualized)  5/2/2024 1731 by Arcelia Torres RN  Outcome: Adequate for Care Transition  5/2/2024 1730 by Arcelia Torres RN  Outcome: Progressing  5/2/2024 1243 by Arcelia Torres RN  Outcome: Progressing  Goal: Absence of Hospital-Acquired Illness or Injury  5/2/2024 1731 by Arcelia Torres RN  Outcome: Adequate for Care Transition  5/2/2024 1730 by Arcelia Torres RN  Outcome: Progressing  5/2/2024 1243 by Arcelia Torres RN  Outcome: Progressing  Goal: Optimal Comfort and Wellbeing  5/2/2024 1731 by Arcelia Torres RN  Outcome: Adequate for Care Transition  5/2/2024 1730 by Arcelia Torres RN  Outcome: Progressing  5/2/2024 1243 by Arcelia Torres RN  Outcome: Progressing  Goal: Readiness for Transition of Care  5/2/2024 1731 by Arcelia Torres RN  Outcome: Adequate for Care Transition  5/2/2024 1730 by Arcelia Torres RN  Outcome: Progressing  5/2/2024 1243 by Arcelia Torres RN  Outcome: Progressing     Problem: Diabetes Comorbidity  Goal: Blood Glucose Level Within Targeted Range  5/2/2024 1731 by Arcelia Torres RN  Outcome: Adequate for Care Transition  5/2/2024 1730 by Arcelia Torres RN  Outcome: Progressing  5/2/2024 1243 by Arcelia Torres RN  Outcome: Progressing

## 2024-05-02 NOTE — PROGRESS NOTES
[facility] [place]  Nurses Note -- 4 Eyes      5/2/2024       Skin assessed on: Admit      [x] No Pressure Injuries Present    []Prevention Measures Documented    [] Yes LDA  for Pressure Injury Previously documented     [] Yes New Pressure Injury Discovered   [] LDA for New Pressure Injury Added      Attending RN:  Ofelia Stallworth RN     Second RN:  KANDI Rapp       
Pt arrive to the unit. Charge Nurse Karen assisted pt to chair. Tele monitoring initiated.  Admit assessment initiated.  Pt is AAO X 4. It is noted pt is SOB on exertion and is coughing. VS obtained and recorded.    Pt oriented to room, bed placed low in ht, wheels locked, side rails up X 3, Call light with in reach.    Plan of care reviewed with pt  
Pt refusing the bed alarm.  Pt educated about the importance of bed alarm for fall prevention.  Pt verbalize understanding but further adds that he is not able to sleep because of flickering lights of bed alarm and would instead prefer to call the nurse to assist him to rest room but does not want to have a bed alarm on.      
Right arm;

## 2024-05-02 NOTE — SUBJECTIVE & OBJECTIVE
"Past Medical History:   Diagnosis Date    Diabetes mellitus     Hypertension     Kidney stone     Stroke        Past Surgical History:   Procedure Laterality Date    COLONOSCOPY N/A 3/27/2024    Procedure: COLONOSCOPY;  Surgeon: Ariela Castro MD;  Location: [facility] ENDO;  Service: Endoscopy;  Laterality: N/A;    ESOPHAGOGASTRODUODENOSCOPY N/A 3/27/2024    Procedure: EGD (ESOPHAGOGASTRODUODENOSCOPY);  Surgeon: Ariela Castro MD;  Location: [facility] ENDO;  Service: Endoscopy;  Laterality: N/A;    SHOULDER SURGERY Right        Review of patient's allergies indicates:   Allergen Reactions    Dapagliflozin      Other reaction(s): Other (See Comments)    Pcn [penicillins]     Linagliptin Other (See Comments)     "it knocked me down", "it almost killed me"    Lisinopril Other (See Comments)     cough    Pantoprazole Hives       Family History       Problem Relation (Age of Onset)    Colon cancer Sister          Tobacco Use    Smoking status: Never     Passive exposure: Never    Smokeless tobacco: Never   Substance and Sexual Activity    Alcohol use: No    Drug use: Never    Sexual activity: Not Currently         Objective:     Vital Signs (Most Recent):  Temp: 98.5 °F (36.9 °C) (05/02/24 1131)  Pulse: 97 (05/02/24 1229)  Resp: 20 (05/02/24 1229)  BP: (!) 156/105 (05/02/24 1131)  SpO2: 96 % (05/02/24 1229) Vital Signs (24h Range):  Temp:  [97.5 °F (36.4 °C)-98.5 °F (36.9 °C)] 98.5 °F (36.9 °C)  Pulse:  [] 97  Resp:  [18-21] 20  SpO2:  [90 %-100 %] 96 %  BP: (152-174)/() 156/105     Weight: 65.3 kg (143 lb 15.4 oz)  Body mass index is 21.89 kg/m².      Intake/Output Summary (Last 24 hours) at 5/2/2024 1357  Last data filed at 5/2/2024 0858  Gross per 24 hour   Intake 1839 ml   Output --   Net 1839 ml        Physical Exam  Vitals and nursing note reviewed.   Constitutional:       General: He is not in acute distress.     Appearance: He is not ill-appearing, toxic-appearing or diaphoretic.   HENT:      " Head: Normocephalic and atraumatic.      Nose: No rhinorrhea.      Mouth/Throat:      Mouth: Mucous membranes are moist.      Pharynx: No posterior oropharyngeal erythema.   Eyes:      General: No scleral icterus.     Extraocular Movements: Extraocular movements intact.   Cardiovascular:      Rate and Rhythm: Normal rate.      Heart sounds: No murmur heard.  Pulmonary:      Effort: No tachypnea, accessory muscle usage, respiratory distress or retractions.   Abdominal:      General: There is no distension.   Skin:     General: Skin is warm and dry.      Coloration: Skin is not jaundiced.      Findings: No rash.   Neurological:      General: No focal deficit present.      Mental Status: He is alert. Mental status is at baseline.          Vents:  Oxygen Concentration (%): 28 (05/02/24 1229)    Lines/Drains/Airways       Peripheral Intravenous Line  Duration                  Peripheral IV - Single Lumen 20 G Left Antecubital -- days                    Significant Labs:    CBC/Anemia Profile:  Recent Labs   Lab 05/01/24  2140   WBC 8.67   HGB 13.9*   HCT 40.1      MCV 82   RDW 13.9        Chemistries:  Recent Labs   Lab 05/01/24  2140      K 4.3      CO2 24   BUN 13   CREATININE 1.2   CALCIUM 10.0   ALBUMIN 3.6   PROT 7.7   BILITOT 0.5   ALKPHOS 59   ALT 17   AST 22   MG 1.3*   PHOS 2.3*       All pertinent labs within the past 24 hours have been reviewed.    Significant Imaging:   I have reviewed all pertinent imaging results/findings within the past 24 hours.

## 2024-05-02 NOTE — DISCHARGE SUMMARY
[facility] Medicine  Discharge Summary      Patient Name: Emmanuel Grant  MRN: [id number]  ALVARO: [id number]  Patient Class: IP- Inpatient  Admission Date: 5/1/2024  Hospital Length of Stay: 1 days  Discharge Date and Time:  05/02/2024 12:23 PM  Attending Physician: Ranjana Hernandez, *   Discharging Provider: Ranjana Hernandez MD  Primary Care Provider: Payam Vu Jr., NP    Primary Care Team: Networked reference to record PCT     HPI:   This is a 73-year-old male with a past medical history of ILD, CAD, HFrEF (EF: 40%, GIDD), BPH, type 2 diabetes, who presents with shortness of breath.     Patient had a recent hospitalization (4/20-4/21) after presenting with generalized weakness in the setting of hypoglycemia.  Since discharge, he reports progressively worsening shortness of breath and associated productive cough with white sputum.  He denies orthopnea or worsening lower extremity swelling.  Patient denies history of smoking.    In the ED, the patient was hypoxic (SpO2:  88%) requiring 2 L O2.  Labs were remarkable for an elevated lactic acid (2.9 > 2.0), negative BNP (82), hyperglycemia (275), hypokalemia (3.4).  VBG showed a pH of 7.39, pCO2 of 50.2.  CTA chest showed no evidence of PE (however, study was limited), re demonstrating findings of ILD, without significant progression and slightly increased conspicuity of ground-glass type attenuation (atelectasis versus edema).  Patient was given 1 L of NS, prednisone 60 mg p.o., potassium bicarbonate 20 mEq, ceftriaxone, azithromycin, aspirin 325 mg p.o., DuoNeb x2.  He was admitted for further management.    * No surgery found *      Hospital Course:   This is a 73-year-old male with a past medical history of ILD, CAD, HFrEF (EF: 40%, GIDD), BPH, type 2 diabetes, who presents with shortness of breath.    In the ED, the patient was hypoxic (SpO2:  88%) requiring 2 L O2.  Labs were remarkable for an elevated lactic acid (2.9 > 2.0),  negative BNP (82), hyperglycemia (275), hypokalemia (3.4).  VBG showed a pH of 7.39, pCO2 of 50.2.  CTA chest showed no evidence of PE (however, study was limited), re demonstrating findings of ILD, without significant progression and slightly increased conspicuity of ground-glass type attenuation (atelectasis versus edema).  Patient was given 1 L of NS, prednisone 60 mg p.o., potassium bicarbonate 20 mEq, ceftriaxone, azithromycin, aspirin 325 mg p.o., DuoNeb x2. Patient was seen by pulmonology,recommended out patient taper prednisone,patient was discharged home with oxygen taper prednisone and  follow up with Pulmonology as out patient.     Goals of Care Treatment Preferences:  Code Status: Full Code      Consults:   Consults (From admission, onward)          Status Ordering Provider     Inpatient consult to Pulmonology  Once        Provider:  Jayson Morales MD    Acknowledged MINNIE HAYDEN            No new Assessment & Plan notes have been filed under this hospital service since the last note was generated.  Service: Hospital Medicine    Final Active Diagnoses:    Diagnosis Date Noted POA    PRINCIPAL PROBLEM:  Acute hypoxic respiratory failure [J96.01] 02/28/2024 Yes    Chronic HFrEF (heart failure with reduced ejection fraction) [I50.22] 05/01/2024 Unknown    BPH (benign prostatic hyperplasia) [N40.0] 02/28/2024 Yes    Coronary artery disease [I25.10] 02/28/2024 Yes    Interstitial lung disease [J84.9] 02/28/2024 Yes    Type 2 diabetes mellitus with hyperglycemia [E11.65] 02/28/2024 Yes    Essential hypertension [I10] 05/29/2020 Yes    Hyperlipidemia [E78.5] 05/29/2020 Yes      Problems Resolved During this Admission:       Discharged Condition: stable    Disposition: Home or Self Care    Follow Up:   Follow-up Information       Payam Vu Jr., NP Follow up in 1 week(s).    Specialty: Internal Medicine  Why: Please call to schedule hospital follow-up to be seen within 1 week.  Contact information:  [address]  "[facility] [address]  [phone]                           Patient Instructions:      OXYGEN FOR HOME USE     Order Specific Question Answer Comments   Liter Flow 2    Duration Continuous    Qualifying Test Performed at: Activity    Oxygen saturation at rest 96    Oxygen saturation with activity 86    Oxygen saturation with activity on oxygen 94    Portable mode: continuous    Route nasal cannula    Device: home concentrator with portable tanks    Length of need (in months): 3 mos    Patient condition with qualifying saturation CHF    Height: 5' 8" (1.727 m)    Weight: 65.3 kg (143 lb 15.4 oz)    Alternative treatment measures have been tried or considered and deemed clinically ineffective. Yes      Activity as tolerated       Significant Diagnostic Studies: Labs: BMP:   Recent Labs   Lab 05/01/24  2140   *      K 4.3      CO2 24   BUN 13   CREATININE 1.2   CALCIUM 10.0   MG 1.3*   , CMP   Recent Labs   Lab 05/01/24  2140      K 4.3      CO2 24   *   BUN 13   CREATININE 1.2   CALCIUM 10.0   PROT 7.7   ALBUMIN 3.6   BILITOT 0.5   ALKPHOS 59   AST 22   ALT 17   ANIONGAP 15   , and CBC   Recent Labs   Lab 05/01/24  2140   WBC 8.67   HGB 13.9*   HCT 40.1        Radiology: X-Ray: CXR: X-Ray Chest 1 View (CXR): No results found for this visit on 05/01/24. and X-Ray Chest PA and Lateral (CXR):   Results for orders placed or performed during the hospital encounter of 05/01/24   X-Ray Chest PA And Lateral    Narrative    EXAMINATION:  XR CHEST PA AND LATERAL    CLINICAL HISTORY:  Cough;    TECHNIQUE:  PA and lateral views of the chest were performed.    COMPARISON:  04/20/2024    FINDINGS:  The cardiac silhouette is normal in size, midline.    The pulmonary vascularity is normal.    Abnormal increased reticular lung markings noted bilaterally.    Low lung volume, unchanged.    No superimposed acute focal area of airspace consolidation    No " pleural effusion, no pneumothorax.    The osseous structures appear within normal limits for age.      Impression    Chronic appearing lung findings, no definite superimpose acute process seen.      Electronically signed by: Yasmine Acosta MD  Date:    05/01/2024  Time:    10:50       Pending Diagnostic Studies:       None           Medications:  Reconciled Home Medications:      Medication List        START taking these medications      * predniSONE 50 MG Tab  Commonly known as: DELTASONE  Take 1 tablet (50 mg total) by mouth once daily for 10 days, then start 20 mg tablets  Start taking on: May 3, 2024     * predniSONE 20 MG tablet  Commonly known as: DELTASONE  Take 1 tablet (20 mg total) by mouth once daily for 10 days, then start 10 mg tabs  Start taking on: May 13, 2024     * predniSONE 10 MG tablet  Commonly known as: DELTASONE  Take 1 tablet (10 mg total) by mouth once daily. for 10 days  Start taking on: May 23, 2024           * This list has 3 medication(s) that are the same as other medications prescribed for you. Read the directions carefully, and ask your doctor or other care provider to review them with you.                CONTINUE taking these medications      amLODIPine 5 MG tablet  Commonly known as: NORVASC  Take 5 mg by mouth once daily.     aspirin 81 MG Chew  Take 81 mg by mouth once daily.     atorvastatin 40 MG tablet  Commonly known as: LIPITOR  Take 40 mg by mouth every evening.     BEANO ORAL  Take 1 tablet by mouth 2 (two) times a day.     carvediloL 25 MG tablet  Commonly known as: COREG  Take 25 mg by mouth 2 (two) times daily with meals.     cholecalciferol (vitamin D3) 50 mcg (2,000 unit) Cap capsule  Commonly known as: VITAMIN D3  1 capsule.     DRY EYE RELIEF OPHT  Apply to eye.     famotidine 20 MG tablet  Commonly known as: PEPCID  TAKE 1 TABLET(20 MG) BY MOUTH TWICE DAILY     latanoprost 0.005 % ophthalmic solution  Place 1 drop into both eyes every evening.     magnesium  citrate solution  Take half the bottle for constipation as needed.     metFORMIN 1000 MG tablet  Commonly known as: GLUCOPHAGE  Take 0.5 tablets (500 mg total) by mouth daily with breakfast.     montelukast 10 mg tablet  Commonly known as: SINGULAIR  Take 10 mg by mouth every evening.     sucralfate 1 gram tablet  Commonly known as: CARAFATE  Take 1 g by mouth 4 (four) times daily.     tamsulosin 0.4 mg Cap  Commonly known as: FLOMAX  Take 1 capsule (0.4 mg total) by mouth once daily.              Indwelling Lines/Drains at time of discharge:   Lines/Drains/Airways       None                   Time spent on the discharge of patient:  over 30  minutes         Ranjana Hernandez MD  Department of Hospital Medicine  [facility] [place] - Telemetry

## 2024-05-02 NOTE — PLAN OF CARE
Problem: Adult Inpatient Plan of Care  Goal: Plan of Care Review  5/2/2024 1730 by Arcelia Torres RN  Outcome: Progressing  5/2/2024 1243 by Arcelia Torres RN  Outcome: Progressing  Goal: Patient-Specific Goal (Individualized)  5/2/2024 1730 by Arcelia Torres RN  Outcome: Progressing  5/2/2024 1243 by Arcelia Torres RN  Outcome: Progressing  Goal: Absence of Hospital-Acquired Illness or Injury  5/2/2024 1730 by Arcelia Torres RN  Outcome: Progressing  5/2/2024 1243 by Arcelia Torres RN  Outcome: Progressing  Goal: Optimal Comfort and Wellbeing  5/2/2024 1730 by Arcelia Torres RN  Outcome: Progressing  5/2/2024 1243 by Arcelia Torres RN  Outcome: Progressing  Goal: Readiness for Transition of Care  5/2/2024 1730 by Arcelia Torres RN  Outcome: Progressing  5/2/2024 1243 by Arcelia Torres RN  Outcome: Progressing

## 2024-05-02 NOTE — CONSULTS
[facility]  Pulmonology  Consult Note    Patient Name: Emmanuel Grant  MRN: [id number]  Admission Date: 5/1/2024  Hospital Length of Stay: 1 days  Code Status: Full Code  Attending Physician: Ranjana Hernandez, *  Primary Care Provider: Payam Vu Jr., NP   Principal Problem: Acute hypoxic respiratory failure    Inpatient consult to Pulmonology  Consult performed by: Jayson Morales MD  Consult ordered by: Ivette Reid DO        Subjective:     HPI:  73-year-old male with GERD, CAD s/p PCI, T2 dm, combined systolic and diastolic heart failure (EF 40%) returning with ongoing shortness of breath.  Recently hospitalized about 2 weeks ago.  Said on discharge he had oxygen and was feeling really well.  Slowly but surely symptoms returned and upon developing chest discomfort felt like he needed to come into the hospital.  In the ER, noted to have an SpO2 of 80% with mild LA elevation.  CTA poor study but possible worsening of ILD    Past Medical History:   Diagnosis Date    Diabetes mellitus     Hypertension     Kidney stone     Stroke        Past Surgical History:   Procedure Laterality Date    COLONOSCOPY N/A 3/27/2024    Procedure: COLONOSCOPY;  Surgeon: Ariela Castro MD;  Location: [facility];  Service: Endoscopy;  Laterality: N/A;    ESOPHAGOGASTRODUODENOSCOPY N/A 3/27/2024    Procedure: EGD (ESOPHAGOGASTRODUODENOSCOPY);  Surgeon: Ariela Castro MD;  Location: [facility];  Service: Endoscopy;  Laterality: N/A;    SHOULDER SURGERY Right        Review of patient's allergies indicates:   Allergen Reactions    Dapagliflozin      Other reaction(s): Other (See Comments)    Pcn [penicillins]     Linagliptin Other (See Comments)     "it knocked me down", "it almost killed me"    Lisinopril Other (See Comments)     cough    Pantoprazole Hives       Family History       Problem Relation (Age of Onset)    Colon cancer Sister          Tobacco Use    Smoking status: Never     Passive exposure: " Never    Smokeless tobacco: Never   Substance and Sexual Activity    Alcohol use: No    Drug use: Never    Sexual activity: Not Currently         Objective:     Vital Signs (Most Recent):  Temp: 98.5 °F (36.9 °C) (05/02/24 1131)  Pulse: 97 (05/02/24 1229)  Resp: 20 (05/02/24 1229)  BP: (!) 156/105 (05/02/24 1131)  SpO2: 96 % (05/02/24 1229) Vital Signs (24h Range):  Temp:  [97.5 °F (36.4 °C)-98.5 °F (36.9 °C)] 98.5 °F (36.9 °C)  Pulse:  [] 97  Resp:  [18-21] 20  SpO2:  [90 %-100 %] 96 %  BP: (152-174)/() 156/105     Weight: 65.3 kg (143 lb 15.4 oz)  Body mass index is 21.89 kg/m².      Intake/Output Summary (Last 24 hours) at 5/2/2024 1357  Last data filed at 5/2/2024 0858  Gross per 24 hour   Intake 1839 ml   Output --   Net 1839 ml        Physical Exam  Vitals and nursing note reviewed.   Constitutional:       General: He is not in acute distress.     Appearance: He is not ill-appearing, toxic-appearing or diaphoretic.   HENT:      Head: Normocephalic and atraumatic.      Nose: No rhinorrhea.      Mouth/Throat:      Mouth: Mucous membranes are moist.      Pharynx: No posterior oropharyngeal erythema.   Eyes:      General: No scleral icterus.     Extraocular Movements: Extraocular movements intact.   Cardiovascular:      Rate and Rhythm: Normal rate.      Heart sounds: No murmur heard.  Pulmonary:      Effort: No tachypnea, accessory muscle usage, respiratory distress or retractions.   Abdominal:      General: There is no distension.   Skin:     General: Skin is warm and dry.      Coloration: Skin is not jaundiced.      Findings: No rash.   Neurological:      General: No focal deficit present.      Mental Status: He is alert. Mental status is at baseline.          Vents:  Oxygen Concentration (%): 28 (05/02/24 1229)    Lines/Drains/Airways       Peripheral Intravenous Line  Duration                  Peripheral IV - Single Lumen 20 G Left Antecubital -- days                    Significant  Labs:    CBC/Anemia Profile:  Recent Labs   Lab 05/01/24  2140   WBC 8.67   HGB 13.9*   HCT 40.1      MCV 82   RDW 13.9        Chemistries:  Recent Labs   Lab 05/01/24  2140      K 4.3      CO2 24   BUN 13   CREATININE 1.2   CALCIUM 10.0   ALBUMIN 3.6   PROT 7.7   BILITOT 0.5   ALKPHOS 59   ALT 17   AST 22   MG 1.3*   PHOS 2.3*       All pertinent labs within the past 24 hours have been reviewed.    Significant Imaging:   I have reviewed all pertinent imaging results/findings within the past 24 hours.    ABG  Recent Labs   Lab 05/01/24  1624   PH 7.395   PO2 25*   PCO2 50.2*   HCO3 30.7*   BE 5*     Assessment/Plan:     Pulmonary  Interstitial lung disease  Unknown ILD.  CT with interlobular septal thickening, honeycombing in the upper lobes with peripheral reticulations and mild bronchiolectasis.  Has not undergone bronchoscopy.  Previously [place] by [facility]monary at [place].  Initially attributed to chronic aspiration given temporal relationship between cough and eating, however most recent EGD unremarkable.  Currently on famotidine 20 mg BID.  Eos 700 recently.  CTD-ILD workup negative with the exception of mildly elevated centromere B antibody.  Per patient, previous pulmonologist was working on Ofev but was unable to get approved.  He would like to follow-up with another pulmonologist.  In the interim, PCT is negative x2 and BNP minimally elevated.  Placed on 60 mg of prednisone with improvement to room air over 24 hours.  Recommend tapering prednisone over the next week or so. I message schedule her about getting him into [facility], as there still seem to be some questions regarding his ILD.    Cardiac/Vascular  Chronic HFrEF (heart failure with reduced ejection fraction)  Does not appear to be in overt overload, however BNP around 80.  Certainly, his heart failure is not helping his chronic dyspnea.          Thank you for your consult. I will follow-up with patient.  Please contact us if you have any additional questions.     Jayson Morales MD  Pulmonology  [facility] [facility]

## 2024-05-02 NOTE — TELEPHONE ENCOUNTER
Patient scheduled for [address] [place] [address] follow up on 5/22/24 at 2pm with Dr Schmitt with pft's prior per Dr Morales. Appointment mailed.

## 2024-05-02 NOTE — PLAN OF CARE
Problem: Adult Inpatient Plan of Care  Goal: Plan of Care Review  Outcome: Progressing     Problem: Adult Inpatient Plan of Care  Goal: Plan of Care Review  Outcome: Progressing  Goal: Patient-Specific Goal (Individualized)  Outcome: Progressing  Goal: Absence of Hospital-Acquired Illness or Injury  Outcome: Progressing  Goal: Optimal Comfort and Wellbeing  Outcome: Progressing  Goal: Readiness for Transition of Care  Outcome: Progressing

## 2024-05-02 NOTE — HPI
This is a 73-year-old male with a past medical history of ILD, CAD, HFrEF (EF: 40%, GIDD), BPH, type 2 diabetes, who presents with shortness of breath.     Patient had a recent hospitalization (4/20-4/21) after presenting with generalized weakness in the setting of hypoglycemia.  Since discharge, he reports progressively worsening shortness of breath and associated productive cough with white sputum.  He denies orthopnea or worsening lower extremity swelling.  Patient denies history of smoking.    In the ED, the patient was hypoxic (SpO2:  88%) requiring 2 L O2.  Labs were remarkable for an elevated lactic acid (2.9 > 2.0), negative BNP (82), hyperglycemia (275), hypokalemia (3.4).  VBG showed a pH of 7.39, pCO2 of 50.2.  CTA chest showed no evidence of PE (however, study was limited), re demonstrating findings of ILD, without significant progression and slightly increased conspicuity of ground-glass type attenuation (atelectasis versus edema).  Patient was given 1 L of NS, prednisone 60 mg p.o., potassium bicarbonate 20 mEq, ceftriaxone, azithromycin, aspirin 325 mg p.o., DuoNeb x2.  He was admitted for further management.

## 2024-05-02 NOTE — ASSESSMENT & PLAN NOTE
Patient's FSGs are controlled on current medication regimen.  Last A1c reviewed-   Lab Results   Component Value Date    HGBA1C 7.0 (H) 02/18/2024     Most recent fingerstick glucose reviewed-   Recent Labs   Lab 05/01/24  0847 05/01/24  1621 05/01/24 2002   POCTGLUCOSE 236* 136* 215*     Current correctional scale  Low  Maintain anti-hyperglycemic dose as follows-   Antihyperglycemics (From admission, onward)      Start     Stop Route Frequency Ordered    05/01/24 2044  insulin aspart U-100 pen 0-5 Units         -- SubQ Before meals & nightly PRN 05/01/24 1944          Hold Oral hypoglycemics while patient is in the hospital.

## 2024-05-02 NOTE — PLAN OF CARE
Case Management Assessment     PCP: Payam Vu   Pharmacy: [facility]    Patient Arrived From: home   Existing Help at Home: none    Barriers to Discharge: none    Discharge Plan:    A. Home    B. Home     SW completed initial assessment and discussed discharge planning with patient at his bedside. Patient stated that  lives alone and he has no help at home. Patient will get a cab to bring him get his car at [facility] emergency in [place].     05/02/24 1240   Discharge Assessment   Assessment Type Discharge Planning Assessment   Confirmed/corrected address, phone number and insurance Yes   Confirmed Demographics Correct on Facesheet   Source of Information patient   Communicated DERIC with patient/caregiver Date not available/Unable to determine   People in Home alone   Do you expect to return to your current living situation? Yes   Do you have help at home or someone to help you manage your care at home? No   Prior to hospitilization cognitive status: Alert/Oriented   Current cognitive status: Alert/Oriented   Walking or Climbing Stairs Difficulty no   Dressing/Bathing Difficulty no   Equipment Currently Used at Home none   Readmission within 30 days? Yes   Patient currently being followed by outpatient case management? No   Do you currently have service(s) that help you manage your care at home? No   Do you take prescription medications? Yes   Do you have prescription coverage? Yes   Coverage PN   Do you have any problems affording any of your prescribed medications? No   Is the patient taking medications as prescribed? yes   Who is going to help you get home at discharge? Patient will drive himself home   How do you get to doctors appointments? car, drives self   Are you on dialysis? No   Do you take coumadin? No   Discharge Plan A Home   Discharge Plan B Home   DME Needed Upon Discharge  oxygen   Discharge Plan discussed with: Patient   Transition of Care Barriers None

## 2024-05-02 NOTE — NURSING
Patient was discharge home with instructions.  IV, tele box monitor and virtual nurse notified.  Patient stated that he didn't have time wait for a virtual and request a cab.

## 2024-05-02 NOTE — SUBJECTIVE & OBJECTIVE
"Past Medical History:   Diagnosis Date    Diabetes mellitus     Hypertension     Kidney stone     Stroke        Past Surgical History:   Procedure Laterality Date    COLONOSCOPY N/A 3/27/2024    Procedure: COLONOSCOPY;  Surgeon: Ariela Castro MD;  Location: [facility] ENDO;  Service: Endoscopy;  Laterality: N/A;    ESOPHAGOGASTRODUODENOSCOPY N/A 3/27/2024    Procedure: EGD (ESOPHAGOGASTRODUODENOSCOPY);  Surgeon: Ariela Castro MD;  Location: [facility] ENDO;  Service: Endoscopy;  Laterality: N/A;    SHOULDER SURGERY Right        Review of patient's allergies indicates:   Allergen Reactions    Dapagliflozin      Other reaction(s): Other (See Comments)    Pcn [penicillins]     Linagliptin Other (See Comments)     "it knocked me down", "it almost killed me"    Lisinopril Other (See Comments)     cough    Pantoprazole Hives       Current Facility-Administered Medications   Medication Dose Route Frequency Provider Last Rate Last Admin    0.9%  NaCl infusion  1,000 mL Intravenous Continuous Ivette Reid  mL/hr at 05/01/24 1132 1,000 mL at 05/01/24 1132    acetaminophen tablet 650 mg  650 mg Oral Q8H PRN Noe Rey MD        acetaminophen tablet 650 mg  650 mg Oral Q4H PRN Noe Rey MD        albuterol-ipratropium 2.5 mg-0.5 mg/3 mL nebulizer solution 3 mL  3 mL Nebulization Q4H PRN Noe Rey MD        aluminum-magnesium hydroxide-simethicone 200-200-20 mg/5 mL suspension 30 mL  30 mL Oral QID PRN Noe Rey MD        [START ON 5/2/2024] amLODIPine tablet 5 mg  5 mg Oral Daily Noe Rey MD        [START ON 5/2/2024] aspirin chewable tablet 81 mg  81 mg Oral Daily Noe Rey MD        atorvastatin tablet 40 mg  40 mg Oral QHS Noe Rey MD   40 mg at 05/01/24 2015    bisacodyL suppository 10 mg  10 mg Rectal Daily PRN Noe Rey MD        carvediloL tablet 25 mg  25 mg Oral BID WM Noe Rey MD   25 mg at 05/01/24 2015    dextrose 10% bolus 125 mL 125 mL  12.5 g Intravenous PRN " Noe Rey MD        dextrose 10% bolus 250 mL 250 mL  25 g Intravenous PRN Noe Rey MD        [START ON 5/2/2024] enoxaparin injection 40 mg  40 mg Subcutaneous Daily Noe Rey MD        glucagon (human recombinant) injection 1 mg  1 mg Intramuscular PRN Noe Rey MD        glucose chewable tablet 16 g  16 g Oral PRN Noe Rey MD        glucose chewable tablet 24 g  24 g Oral PRN Noe Rey MD        insulin aspart U-100 pen 0-5 Units  0-5 Units Subcutaneous QID (AC + HS) PRN Noe Rey MD   1 Units at 05/01/24 2015    latanoprost 0.005 % ophthalmic solution 1 drop  1 drop Both Eyes QNoe Waller MD        magnesium oxide tablet 800 mg  800 mg Oral PRN Noe Rey MD        magnesium oxide tablet 800 mg  800 mg Oral PRN Noe Rey MD        melatonin tablet 6 mg  6 mg Oral Nightly PRN Noe Rey MD        montelukast tablet 10 mg  10 mg Oral QHS Noe Rey MD   10 mg at 05/01/24 2015    naloxone 0.4 mg/mL injection 0.02 mg  0.02 mg Intravenous PRN Noe Rey MD        ondansetron injection 4 mg  4 mg Intravenous Q8H PRN Noe Rey MD        [START ON 5/2/2024] polyethylene glycol packet 17 g  17 g Oral Daily Noe Rey MD        potassium bicarbonate disintegrating tablet 35 mEq  35 mEq Oral PRNoe Nicolas MD        potassium bicarbonate disintegrating tablet 50 mEq  50 mEq Oral PRN Noe Rey MD        potassium bicarbonate disintegrating tablet 60 mEq  60 mEq Oral PRN Noe Rey MD        potassium, sodium phosphates 280-160-250 mg packet 2 packet  2 packet Oral PRN Noe Rey MD        potassium, sodium phosphates 280-160-250 mg packet 2 packet  2 packet Oral PRN Noe Rey MD        potassium, sodium phosphates 280-160-250 mg packet 2 packet  2 packet Oral PRN Noe Rey MD        prochlorperazine injection Soln 5 mg  5 mg Intravenous Q6H PRNoe Nicolas MD        simethicone chewable tablet 80 mg  1 tablet Oral QID PRN Noe Rey MD         sodium chloride 0.9% flush 10 mL  10 mL Intravenous Q12H PRN Noe Rey MD        [START ON 5/2/2024] tamsulosin 24 hr capsule 0.4 mg  0.4 mg Oral Daily Noe Rey MD         Family History       Problem Relation (Age of Onset)    Colon cancer Sister          Tobacco Use    Smoking status: Never     Passive exposure: Never    Smokeless tobacco: Never   Substance and Sexual Activity    Alcohol use: No    Drug use: Never    Sexual activity: Not Currently     Review of Systems   Constitutional: Negative.    HENT: Negative.     Eyes: Negative.    Respiratory:  Positive for cough and shortness of breath.    Cardiovascular: Negative.    Gastrointestinal: Negative.    Endocrine: Negative.    Genitourinary: Negative.    Musculoskeletal: Negative.    Skin: Negative.    Allergic/Immunologic: Negative.    Neurological: Negative.    Psychiatric/Behavioral: Negative.       Objective:     Vital Signs (Most Recent):  Temp: 97.5 °F (36.4 °C) (05/01/24 1958)  Pulse: 94 (05/01/24 1958)  Resp: (!) 21 (05/01/24 1958)  BP: (!) 161/97 (05/01/24 1958)  SpO2: (!) 92 % (05/01/24 1958) Vital Signs (24h Range):  Temp:  [97.5 °F (36.4 °C)-97.9 °F (36.6 °C)] 97.5 °F (36.4 °C)  Pulse:  [] 94  Resp:  [18-21] 21  SpO2:  [88 %-100 %] 92 %  BP: (137-174)/(81-97) 161/97     Weight: 66.2 kg (145 lb 15.1 oz)  Body mass index is 22.19 kg/m².     Physical Exam  Vitals and nursing note reviewed.   Constitutional:       General: He is not in acute distress.     Appearance: Normal appearance. He is not ill-appearing.   HENT:      Head: Normocephalic and atraumatic.      Nose: Nose normal.      Mouth/Throat:      Mouth: Mucous membranes are moist.   Eyes:      Extraocular Movements: Extraocular movements intact.   Cardiovascular:      Rate and Rhythm: Normal rate.      Pulses: Normal pulses.      Heart sounds: No murmur heard.  Pulmonary:      Effort: Pulmonary effort is normal. No respiratory distress.      Breath sounds: Decreased breath  sounds present.   Abdominal:      General: Abdomen is flat.      Palpations: Abdomen is soft.      Tenderness: There is no abdominal tenderness.   Musculoskeletal:      Right lower leg: No edema.      Left lower leg: No edema.   Skin:     General: Skin is warm.      Capillary Refill: Capillary refill takes less than 2 seconds.   Neurological:      General: No focal deficit present.      Mental Status: He is alert.   Psychiatric:         Mood and Affect: Mood normal.                Significant Labs: All pertinent labs within the past 24 hours have been reviewed.    Significant Imaging: I have reviewed all pertinent imaging results/findings within the past 24 hours.

## 2024-05-02 NOTE — HPI
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  Recently hospitalized about 2 weeks ago.  Said on discharge he had oxygen and was feeling really well.  Slowly but surely symptoms returned and upon developing chest discomfort felt like he needed to come into the hospital.  In the ER, noted to have an SpO2 of 80% with mild LA elevation.  CTA poor study but possible worsening of ILD

## 2024-05-05 LAB
BACTERIA BLD CULT: NORMAL
BACTERIA BLD CULT: NORMAL

## 2024-05-06 ENCOUNTER — PATIENT OUTREACH (OUTPATIENT)
Dept: ADMINISTRATIVE | Facility: HOSPITAL | Age: 74
End: 2024-05-06
Payer: MEDICARE

## 2024-05-06 ENCOUNTER — OFFICE VISIT (OUTPATIENT)
Dept: FAMILY MEDICINE | Facility: CLINIC | Age: 74
End: 2024-05-06
Payer: MEDICARE

## 2024-05-06 VITALS
WEIGHT: 142.88 LBS | SYSTOLIC BLOOD PRESSURE: 132 MMHG | DIASTOLIC BLOOD PRESSURE: 72 MMHG | BODY MASS INDEX: 21.65 KG/M2 | HEART RATE: 75 BPM | HEIGHT: 68 IN | TEMPERATURE: 98 F | OXYGEN SATURATION: 96 %

## 2024-05-06 DIAGNOSIS — J96.11 CHRONIC HYPOXEMIC RESPIRATORY FAILURE: ICD-10-CM

## 2024-05-06 DIAGNOSIS — I25.10 CORONARY ARTERY DISEASE, UNSPECIFIED VESSEL OR LESION TYPE, UNSPECIFIED WHETHER ANGINA PRESENT, UNSPECIFIED WHETHER NATIVE OR TRANSPLANTED HEART: ICD-10-CM

## 2024-05-06 DIAGNOSIS — E11.65 TYPE 2 DIABETES MELLITUS WITH HYPERGLYCEMIA, UNSPECIFIED WHETHER LONG TERM INSULIN USE: ICD-10-CM

## 2024-05-06 DIAGNOSIS — J84.9 INTERSTITIAL LUNG DISEASE: ICD-10-CM

## 2024-05-06 DIAGNOSIS — I50.22 CHRONIC HFREF (HEART FAILURE WITH REDUCED EJECTION FRACTION): ICD-10-CM

## 2024-05-06 DIAGNOSIS — Z09 HOSPITAL DISCHARGE FOLLOW-UP: Primary | ICD-10-CM

## 2024-05-06 DIAGNOSIS — Z00.00 HEALTHCARE MAINTENANCE: ICD-10-CM

## 2024-05-06 DIAGNOSIS — I10 ESSENTIAL HYPERTENSION: ICD-10-CM

## 2024-05-06 DIAGNOSIS — E78.5 HYPERLIPIDEMIA, UNSPECIFIED HYPERLIPIDEMIA TYPE: ICD-10-CM

## 2024-05-06 PROCEDURE — 3075F SYST BP GE 130 - 139MM HG: CPT | Mod: CPTII,S$GLB,, | Performed by: INTERNAL MEDICINE

## 2024-05-06 PROCEDURE — 3078F DIAST BP <80 MM HG: CPT | Mod: CPTII,S$GLB,, | Performed by: INTERNAL MEDICINE

## 2024-05-06 PROCEDURE — 1101F PT FALLS ASSESS-DOCD LE1/YR: CPT | Mod: CPTII,S$GLB,, | Performed by: INTERNAL MEDICINE

## 2024-05-06 PROCEDURE — 1160F RVW MEDS BY RX/DR IN RCRD: CPT | Mod: CPTII,S$GLB,, | Performed by: INTERNAL MEDICINE

## 2024-05-06 PROCEDURE — 99495 TRANSJ CARE MGMT MOD F2F 14D: CPT | Mod: S$GLB,,, | Performed by: INTERNAL MEDICINE

## 2024-05-06 PROCEDURE — 3051F HG A1C>EQUAL 7.0%<8.0%: CPT | Mod: CPTII,S$GLB,, | Performed by: INTERNAL MEDICINE

## 2024-05-06 PROCEDURE — 1126F AMNT PAIN NOTED NONE PRSNT: CPT | Mod: CPTII,S$GLB,, | Performed by: INTERNAL MEDICINE

## 2024-05-06 PROCEDURE — 1111F DSCHRG MED/CURRENT MED MERGE: CPT | Mod: CPTII,S$GLB,, | Performed by: INTERNAL MEDICINE

## 2024-05-06 PROCEDURE — 1159F MED LIST DOCD IN RCRD: CPT | Mod: CPTII,S$GLB,, | Performed by: INTERNAL MEDICINE

## 2024-05-06 PROCEDURE — 99999 PR PBB SHADOW E&M-EST. PATIENT-LVL V: CPT | Mod: PBBFAC,,, | Performed by: INTERNAL MEDICINE

## 2024-05-06 PROCEDURE — 3288F FALL RISK ASSESSMENT DOCD: CPT | Mod: CPTII,S$GLB,, | Performed by: INTERNAL MEDICINE

## 2024-05-06 RX ORDER — IPRATROPIUM BROMIDE AND ALBUTEROL SULFATE 2.5; .5 MG/3ML; MG/3ML
3 SOLUTION RESPIRATORY (INHALATION) EVERY 6 HOURS PRN
Qty: 75 ML | Refills: 0 | Status: SHIPPED | OUTPATIENT
Start: 2024-05-06 | End: 2024-05-22 | Stop reason: SDUPTHER

## 2024-05-06 RX ORDER — FLUTICASONE PROPIONATE 50 MCG
SPRAY, SUSPENSION (ML) NASAL
COMMUNITY

## 2024-05-06 RX ORDER — AMOXICILLIN 250 MG
1 CAPSULE ORAL DAILY
COMMUNITY

## 2024-05-06 RX ORDER — ATORVASTATIN CALCIUM 40 MG/1
40 TABLET, FILM COATED ORAL NIGHTLY
Qty: 90 TABLET | Refills: 1 | Status: SHIPPED | OUTPATIENT
Start: 2024-05-06

## 2024-05-06 RX ORDER — POLYETHYLENE GLYCOL 3350 17 G/17G
POWDER, FOR SOLUTION ORAL
COMMUNITY

## 2024-05-06 NOTE — PROGRESS NOTES
HISTORY OF PRESENT ILLNESS:  Emmanuel Grant is a 73 y.o. male with ILD, CAD, HFrEF (EF: 40%, GIDD), BPH, type 2 diabetes who presents to the clinic today for Establish Care    My first encounter with patient.    Prior PCP in [place]: Dr. Payam Menjivar.    Upcoming pulmonology appt 5/22/24.  Breathing is improved.  O2 today 96% at rest.    Recently Admitted:    1) 5/1-5/2/2024:  HPI:   This is a 73-year-old male with a past medical history of ILD, CAD, HFrEF (EF: 40%, GIDD), BPH, type 2 diabetes, who presents with shortness of breath.      Patient had a recent hospitalization (4/20-4/21) after presenting with generalized weakness in the setting of hypoglycemia.  Since discharge, he reports progressively worsening shortness of breath and associated productive cough with white sputum.  He denies orthopnea or worsening lower extremity swelling.  Patient denies history of smoking.     In the ED, the patient was hypoxic (SpO2:  88%) requiring 2 L O2.  Labs were remarkable for an elevated lactic acid (2.9 > 2.0), negative BNP (82), hyperglycemia (275), hypokalemia (3.4).  VBG showed a pH of 7.39, pCO2 of 50.2.  CTA chest showed no evidence of PE (however, study was limited), re demonstrating findings of ILD, without significant progression and slightly increased conspicuity of ground-glass type attenuation (atelectasis versus edema).  Patient was given 1 L of NS, prednisone 60 mg p.o., potassium bicarbonate 20 mEq, ceftriaxone, azithromycin, aspirin 325 mg p.o., DuoNeb x2.  He was admitted for further management.     * No surgery found *       Hospital Course:   This is a 73-year-old male with a past medical history of ILD, CAD, HFrEF (EF: 40%, GIDD), BPH, type 2 diabetes, who presents with shortness of breath.    In the ED, the patient was hypoxic (SpO2:  88%) requiring 2 L O2.  Labs were remarkable for an elevated lactic acid (2.9 > 2.0), negative BNP (82), hyperglycemia (275), hypokalemia (3.4).  VBG showed a pH of 7.39,  pCO2 of 50.2.  CTA chest showed no evidence of PE (however, study was limited), re demonstrating findings of ILD, without significant progression and slightly increased conspicuity of ground-glass type attenuation (atelectasis versus edema).  Patient was given 1 L of NS, prednisone 60 mg p.o., potassium bicarbonate 20 mEq, ceftriaxone, azithromycin, aspirin 325 mg p.o., DuoNeb x2. Patient was seen by pulmonology,recommended out patient taper prednisone,patient was discharged home with oxygen taper prednisone and  follow up with Pulmonology as out patient.       2) 4/20-4/21/24:  HPI:   Emmanuel Grant 73 y.o. male with CAD, DM, BPH, HTN, presents to the hospital with a chief complaint of weakness.  He reports 2 days of intermittent weakness fatigue with associated diaphoresis.  He found his symptoms were more severe this morning causing presentation in the emergency room.  He reports they feel improved with treatment in the ER.  He states he last took his sulfonylurea this morning prior to arrival hospital.  He denies fever chest pain nausea vomiting abdominal leg swelling melena hematuria hematemesis dizziness syncope.  He has been maintaining normal p.o. intake outpatient and ate a full dinner last night.     In the ED, her currently hypoglycemic as low as 51 afebrile without leukocytosis initial lactic acid 2.9 troponin negative calcium 11.1 chest x-ray with chronic appearing lung findings no definite acute process seen CT abdomen pelvis without acute intra-abdominal process though pulmonary fibrosis, CT head without acute intracranial process.     * No surgery found *       Hospital Course:   Emmanuel Grant 73 y.o. male with CAD, DM, BPH, HTN, presents to the hospital with a chief complaint of weakness. Pt was found to be hypoglycemic as low as 51 afebrile without leukocytosis initial lactic acid 2.9 troponin negative calcium 11.1 chest x-ray with chronic appearing lung findings no definite acute process seen  CT abdomen pelvis without acute intra-abdominal process though pulmonary fibrosis, CT head without acute intracranial process.     Pt started on treatment w/ D10, his symptoms have significanty improved following correction of his blood sugars. Suspect patient hypoglycemia to be 2/2 medication side effect (sulfonylurea) shayna following recent increase in his dose. We discontinued Glimepiride upon discharge and recommended close follow up w/ primary/endocrine.       Hemoglobin A1C   Date Value Ref Range Status   02/18/2024 7.0 (H) <5.7 % Final   04/30/2023 6.1 (H) 4.0 - 5.6 % Final     Comment:     ADA Screening Guidelines:  5.7-6.4%  Consistent with prediabetes  >or=6.5%  Consistent with diabetes    High levels of fetal hemoglobin interfere with the HbA1C  assay. Heterozygous hemoglobin variants (HbS, HgC, etc)do  not significantly interfere with this assay.   However, presence of multiple variants may affect accuracy.       Hemoglobin A1c   Date Value Ref Range Status   07/10/2021 6.8 (H) <5.7 % of total Hgb Final     Comment:     For someone without known diabetes, a hemoglobin A1c  value of 6.5% or greater indicates that they may have   diabetes and this should be confirmed with a follow-up   test.    For someone with known diabetes, a value <7% indicates   that their diabetes is well controlled and a value   greater than or equal to 7% indicates suboptimal   control. A1c targets should be individualized based on   duration of diabetes, age, comorbid conditions, and   other considerations.    Currently, no consensus exists regarding use of  hemoglobin A1c for diagnosis of diabetes for children.             PAST MEDICAL HISTORY:  Past Medical History:   Diagnosis Date    CAD (coronary artery disease)     Sees [facility], h/o stent    Diabetes mellitus     Hypertension     Kidney stone     Stroke     age 62       PAST SURGICAL HISTORY:  Past Surgical History:   Procedure Laterality Date    CARDIAC CATHETERIZATION      with  "stent    COLONOSCOPY N/A 03/27/2024    Procedure: COLONOSCOPY;  Surgeon: Ariela Castro MD;  Location: [facility] ENDO;  Service: Endoscopy;  Laterality: N/A;    ESOPHAGOGASTRODUODENOSCOPY N/A 03/27/2024    Procedure: EGD (ESOPHAGOGASTRODUODENOSCOPY);  Surgeon: Ariela Castro MD;  Location: [facility] ENDO;  Service: Endoscopy;  Laterality: N/A;    SHOULDER SURGERY Right        SOCIAL HISTORY:  Social History     Socioeconomic History    Marital status: Single    Number of children: 3   Occupational History    Occupation: retired age 72 - [employer]   Tobacco Use    Smoking status: Never     Passive exposure: Never    Smokeless tobacco: Never   Substance and Sexual Activity    Alcohol use: No    Drug use: Never    Sexual activity: Not Currently       FAMILY HISTORY:  Family History   Problem Relation Name Age of Onset    Cancer Mother      Colon cancer Sister      Esophageal cancer Neg Hx         ALLERGIES AND MEDICATIONS: updated and reviewed.  Review of patient's allergies indicates:   Allergen Reactions    Dapagliflozin      Other reaction(s): Other (See Comments)    Pcn [penicillins]     Linagliptin Other (See Comments)     "it knocked me down", "it almost killed me"    Lisinopril Other (See Comments)     cough    Pantoprazole Hives     Medication List with Changes/Refills   New Medications    ALBUTEROL-IPRATROPIUM (DUO-NEB) 2.5 MG-0.5 MG/3 ML NEBULIZER SOLUTION    Take 3 mLs by nebulization every 6 (six) hours as needed for Wheezing. Rescue   Current Medications    ALPHA-D-GALACTOSIDASE (BEANO ORAL)    Take 1 tablet by mouth 2 (two) times a day.    AMLODIPINE (NORVASC) 5 MG TABLET    Take 5 mg by mouth once daily.    ASPIRIN 81 MG CHEW    Take 81 mg by mouth once daily.    CARVEDILOL (COREG) 25 MG TABLET    Take 25 mg by mouth 2 (two) times daily with meals.    CHOLECALCIFEROL, VITAMIN D3, (VITAMIN D3) 50 MCG (2,000 UNIT) CAP CAPSULE    1 capsule.    FAMOTIDINE (PEPCID) 20 MG TABLET    TAKE 1 TABLET(20 MG) " BY MOUTH TWICE DAILY    FLUTICASONE PROPIONATE (FLONASE) 50 MCG/ACTUATION NASAL SPRAY    1 spray in each nostril Nasally Once a day    LATANOPROST 0.005 % OPHTHALMIC SOLUTION    Place 1 drop into both eyes every evening.    MAGNESIUM CITRATE SOLUTION    Take half the bottle for constipation as needed.    METFORMIN (GLUCOPHAGE) 1000 MG TABLET    Take 0.5 tablets (500 mg total) by mouth daily with breakfast.    MONTELUKAST (SINGULAIR) 10 MG TABLET    Take 10 mg by mouth every evening.    /HYPROMELLOSE/GLYCERIN (DRY EYE RELIEF OPHT)    Apply to eye.    POLYETHYLENE GLYCOL (GLYCOLAX) 17 GRAM PWPK    Take by mouth.    PREDNISONE (DELTASONE) 10 MG TABLET    Take 1 tablet (10 mg total) by mouth once daily. for 10 days    PREDNISONE (DELTASONE) 20 MG TABLET    Take 1 tablet (20 mg total) by mouth once daily for 10 days, then start 10 mg tabs    PREDNISONE (DELTASONE) 50 MG TAB    Take 1 tablet (50 mg total) by mouth once daily for 10 days, then start 20 mg tablets    SENNA-DOCUSATE 8.6-50 MG (SENNA WITH DOCUSATE SODIUM) 8.6-50 MG PER TABLET    Take 1 tablet by mouth once daily.    SUCRALFATE (CARAFATE) 1 GRAM TABLET    Take 1 g by mouth 4 (four) times daily.    TAMSULOSIN (FLOMAX) 0.4 MG CAP    Take 1 capsule (0.4 mg total) by mouth once daily.   Changed and/or Refilled Medications    Modified Medication Previous Medication    ATORVASTATIN (LIPITOR) 40 MG TABLET atorvastatin (LIPITOR) 40 MG tablet       Take 1 tablet (40 mg total) by mouth every evening.    Take 40 mg by mouth every evening.          CARE TEAM:  Patient Care Team:  Payam Vu Jr., NP as PCP - General (Internal Medicine)         REVIEW OF SYSTEMS:  Review of Systems   Constitutional:  Negative for chills and fever.   HENT:  Negative for congestion and postnasal drip.    Eyes:  Negative for photophobia and visual disturbance.   Respiratory:  Positive for cough and shortness of breath.    Cardiovascular:  Negative for chest pain and palpitations.    Gastrointestinal:  Negative for nausea and vomiting.   Genitourinary:  Negative for dysuria and frequency.   Musculoskeletal:  Negative for arthralgias and back pain.   Skin:  Negative for color change and pallor.   Neurological:  Negative for dizziness and light-headedness.   Psychiatric/Behavioral:  Negative for dysphoric mood. The patient is not nervous/anxious.          PHYSICAL EXAM:  Vitals:    05/06/24 1011   BP: 132/72   Pulse: 75   Temp: 97.9 °F (36.6 °C)             Body mass index is 21.72 kg/m².    Physical Examination: General appearance - alert, well appearing, and in no distress and normal appearing weight  Mental status - normal mood, behavior, speech, dress, motor activity, and thought processes  Eyes - sclera anicteric, left eye normal, right eye normal  Chest - clear to auscultation, no wheezes, rales or rhonchi, symmetric air entry  Heart - normal rate, regular rhythm, normal S1, S2, no murmurs, rubs, clicks or gallops  Neurological - alert, oriented, normal speech, no focal findings or movement disorder noted  Extremities - peripheral pulses normal, no pedal edema, no clubbing or cyanosis       ASSESSMENT AND PLAN:  Hospital discharge follow-up  Chronic hypoxemic respiratory failure  Interstitial lung disease  Chronic HFrEF (heart failure with reduced ejection fraction)  -     NEBULIZER FOR HOME USE  -     albuterol-ipratropium (DUO-NEB) 2.5 mg-0.5 mg/3 mL nebulizer solution; Take 3 mLs by nebulization every 6 (six) hours as needed for Wheezing. Rescue  Dispense: 75 mL; Refill: 0  - Advised for follow up with PFT and pulmonology appt.  Continue current meds.    Type 2 diabetes mellitus with hyperglycemia, unspecified whether long term insulin use  -     Microalbumin/Creatinine Ratio, Urine; Future; Expected date: 05/06/2024  -     Lipid Panel; Future; Expected date: 05/06/2024  -     Comprehensive Metabolic Panel; Future; Expected date: 05/06/2024  -     atorvastatin (LIPITOR) 40 MG tablet;  Take 1 tablet (40 mg total) by mouth every evening.  Dispense: 90 tablet; Refill: 1  -     Hemoglobin A1C; Future; Expected date: 08/06/2024    Healthcare maintenance  -     Microalbumin/Creatinine Ratio, Urine; Future; Expected date: 05/06/2024  -     Hepatitis C Antibody; Future; Expected date: 05/06/2024  -     Lipid Panel; Future; Expected date: 05/06/2024  -     Comprehensive Metabolic Panel; Future; Expected date: 05/06/2024    Essential hypertension  Coronary artery disease, unspecified vessel or lesion type, unspecified whether angina present, unspecified whether native or transplanted heart  Hyperlipidemia, unspecified hyperlipidemia type  -     atorvastatin (LIPITOR) 40 MG tablet; Take 1 tablet (40 mg total) by mouth every evening.  Dispense: 90 tablet; Refill: 1  - Stable on current medical management.      Follow up 3 months or sooner as needed.

## 2024-05-06 NOTE — LETTER
AUTHORIZATION FOR RELEASE OF   CONFIDENTIAL INFORMATION    Dear Marc Blake OD ,    We are seeing Emmanuel Grant, date of birth 1950, in the clinic at [facility]. Wilfredo De Souza MD is the patient's PCP. Emmanuel Grant has an outstanding lab/procedure at the time we reviewed his chart. In order to help keep his health information updated, he has authorized us to request the following medical record(s):        (  )  MAMMOGRAM                                      (  )  COLONOSCOPY      (  )  PAP SMEAR                                          (  )  OUTSIDE LAB RESULTS     (  )  DEXA SCAN                                          ( X ) DIABETIC EYE EXAM            (  )  FOOT EXAM                                          (  )  ENTIRE RECORD     (  )  OUTSIDE IMMUNIZATIONS                 (  )  _______________         Please fax records to [facility], Kiran K. Anand,MD, FAX [phone] [address] [facility] [address]         If you have any questions, please contact Juan Sterling MA,[facility] at [phone].             Patient Name: Emmanuel Grant  : 1950  Patient Phone #: [phone]

## 2024-05-07 ENCOUNTER — TELEPHONE (OUTPATIENT)
Dept: FAMILY MEDICINE | Facility: CLINIC | Age: 74
End: 2024-05-07
Payer: MEDICARE

## 2024-05-07 ENCOUNTER — LAB VISIT (OUTPATIENT)
Dept: LAB | Facility: HOSPITAL | Age: 74
End: 2024-05-07
Attending: INTERNAL MEDICINE
Payer: MEDICARE

## 2024-05-07 DIAGNOSIS — E16.2 HYPOGLYCEMIA: ICD-10-CM

## 2024-05-07 DIAGNOSIS — E11.65 TYPE 2 DIABETES MELLITUS WITH HYPERGLYCEMIA, UNSPECIFIED WHETHER LONG TERM INSULIN USE: ICD-10-CM

## 2024-05-07 DIAGNOSIS — Z00.00 HEALTHCARE MAINTENANCE: ICD-10-CM

## 2024-05-07 DIAGNOSIS — E11.65 TYPE 2 DIABETES MELLITUS WITH HYPERGLYCEMIA, UNSPECIFIED WHETHER LONG TERM INSULIN USE: Primary | ICD-10-CM

## 2024-05-07 LAB
ALBUMIN SERPL BCP-MCNC: 3.5 G/DL (ref 3.5–5.2)
ALP SERPL-CCNC: 53 U/L (ref 55–135)
ALT SERPL W/O P-5'-P-CCNC: 34 U/L (ref 10–44)
ANION GAP SERPL CALC-SCNC: 12 MMOL/L (ref 8–16)
AST SERPL-CCNC: 18 U/L (ref 10–40)
BILIRUB SERPL-MCNC: 0.6 MG/DL (ref 0.1–1)
BUN SERPL-MCNC: 27 MG/DL (ref 8–23)
CALCIUM SERPL-MCNC: 9.1 MG/DL (ref 8.7–10.5)
CHLORIDE SERPL-SCNC: 101 MMOL/L (ref 95–110)
CHOLEST SERPL-MCNC: 109 MG/DL (ref 120–199)
CHOLEST/HDLC SERPL: 2.8 {RATIO} (ref 2–5)
CO2 SERPL-SCNC: 25 MMOL/L (ref 23–29)
CREAT SERPL-MCNC: 1.2 MG/DL (ref 0.5–1.4)
EST. GFR  (NO RACE VARIABLE): >60 ML/MIN/1.73 M^2
GLUCOSE SERPL-MCNC: 229 MG/DL (ref 70–110)
HCV AB SERPL QL IA: NORMAL
HDLC SERPL-MCNC: 39 MG/DL (ref 40–75)
HDLC SERPL: 35.8 % (ref 20–50)
LDLC SERPL CALC-MCNC: 58 MG/DL (ref 63–159)
NONHDLC SERPL-MCNC: 70 MG/DL
POTASSIUM SERPL-SCNC: 4 MMOL/L (ref 3.5–5.1)
PROT SERPL-MCNC: 7.4 G/DL (ref 6–8.4)
SODIUM SERPL-SCNC: 138 MMOL/L (ref 136–145)
TRIGL SERPL-MCNC: 60 MG/DL (ref 30–150)

## 2024-05-07 PROCEDURE — 86803 HEPATITIS C AB TEST: CPT | Performed by: INTERNAL MEDICINE

## 2024-05-07 PROCEDURE — 36415 COLL VENOUS BLD VENIPUNCTURE: CPT | Mod: PN | Performed by: INTERNAL MEDICINE

## 2024-05-07 PROCEDURE — 80053 COMPREHEN METABOLIC PANEL: CPT | Performed by: INTERNAL MEDICINE

## 2024-05-07 PROCEDURE — 80061 LIPID PANEL: CPT | Performed by: INTERNAL MEDICINE

## 2024-05-07 RX ORDER — LANCETS
EACH MISCELLANEOUS
Qty: 100 EACH | Refills: 2 | Status: SHIPPED | OUTPATIENT
Start: 2024-05-07

## 2024-05-07 RX ORDER — INSULIN PUMP SYRINGE, 3 ML
EACH MISCELLANEOUS
Qty: 1 EACH | Refills: 0 | Status: SHIPPED | OUTPATIENT
Start: 2024-05-07 | End: 2025-05-07

## 2024-05-07 NOTE — PHYSICIAN QUERY
Due to the conflicting clinical picture, please clinically validate the Acute hypoxic respiratory failure diagnosis.  Acute hypoxic respiratory failure

## 2024-05-07 NOTE — TELEPHONE ENCOUNTER
----- Message from Antonio Cornelius sent at 5/7/2024  3:25 PM CDT -----  Type: Patient Call Back    Who called:[facility]    What is the request in detail:In regards to pt test strips, needs to be faxed     Can the clinic reply by [facility]?No    Would the patient rather a call back or a response via [facility]? call    Best call back number:[phone]    Additional Information:Fax [phone]

## 2024-05-07 NOTE — TELEPHONE ENCOUNTER
Patient is requesting [employer] test strips in sent to pharmacy. Patient test blood sugar  1 x  daily

## 2024-05-09 ENCOUNTER — TELEPHONE (OUTPATIENT)
Dept: FAMILY MEDICINE | Facility: CLINIC | Age: 74
End: 2024-05-09
Payer: MEDICARE

## 2024-05-09 NOTE — TELEPHONE ENCOUNTER
----- Message from Jacinto Paulo sent at 5/9/2024 11:52 AM CDT -----  Regarding: self  Type: Patient Call Back    Who called:self    What is the request in detail:calling to speak the office regarding atorvastatin (LIPITOR) 40 MG tablet not sure if he needs to still take this. Wants to speak with office    Can the clinic reply by [facility]?no    Would the patient rather a call back or a response via [facility]? callback    Best call back number:[phone]    Additional Information:

## 2024-05-09 NOTE — TELEPHONE ENCOUNTER
Patient called to ask about dosage of Lipitor 40 mg medication . Patient notified this mediation was originally  prescribed during hospital visit

## 2024-05-15 ENCOUNTER — TELEPHONE (OUTPATIENT)
Dept: FAMILY MEDICINE | Facility: CLINIC | Age: 74
End: 2024-05-15
Payer: MEDICARE

## 2024-05-15 NOTE — TELEPHONE ENCOUNTER
----- Message from Wilfredo De Souza MD sent at 5/15/2024  8:41 AM CDT -----  Urine testing was slightly abnormal.  Continue efforts for control of diabetes through lifestyle and medication.  We can discuss repeating the test at next visit in the case additional medication is needed.

## 2024-05-15 NOTE — TELEPHONE ENCOUNTER
----- Message from Wilfredo De Souza MD sent at 5/15/2024  8:40 AM CDT -----  Please contact the patient and let them know that their results were stable and do not require any change in treatment.  Continue current diabetes medication along with atorvastatin and current blood pressure medications.

## 2024-05-20 RX ORDER — CARVEDILOL 25 MG/1
25 TABLET ORAL 2 TIMES DAILY WITH MEALS
Qty: 60 TABLET | Refills: 5 | Status: SHIPPED | OUTPATIENT
Start: 2024-05-20

## 2024-05-20 NOTE — TELEPHONE ENCOUNTER
----- Message from Kathleen Ortega sent at 5/20/2024 10:10 AM CDT -----  Regarding: Refill request  .Type: RX Refill Request    Who Called:self     Have you contacted your pharmacy:no     Refill or New Rx: Refill    RX Name and Strength:carvedilol (COREG) 25 MG tablet    Preferred Pharmacy with phone number:.  [facility] [id number] [place] [id number]  Phone: [phone] Fax: [phone]    Local or Mail Order:local     Ordering Provider:N/A    Would the patient rather a call back or a response via [facility]? Call     Best Call Back Number:.[phone]      Additional Information: 4 pills left

## 2024-05-22 ENCOUNTER — OFFICE VISIT (OUTPATIENT)
Dept: PULMONOLOGY | Facility: CLINIC | Age: 74
End: 2024-05-22
Payer: MEDICARE

## 2024-05-22 ENCOUNTER — PATIENT OUTREACH (OUTPATIENT)
Dept: ADMINISTRATIVE | Facility: HOSPITAL | Age: 74
End: 2024-05-22
Payer: MEDICARE

## 2024-05-22 ENCOUNTER — HOSPITAL ENCOUNTER (OUTPATIENT)
Dept: PULMONOLOGY | Facility: CLINIC | Age: 74
Discharge: HOME | End: 2024-05-22
Payer: MEDICARE

## 2024-05-22 VITALS
DIASTOLIC BLOOD PRESSURE: 76 MMHG | HEIGHT: 68 IN | HEART RATE: 84 BPM | WEIGHT: 144.63 LBS | SYSTOLIC BLOOD PRESSURE: 132 MMHG | OXYGEN SATURATION: 95 % | BODY MASS INDEX: 21.92 KG/M2

## 2024-05-22 DIAGNOSIS — R06.02 SOB (SHORTNESS OF BREATH): ICD-10-CM

## 2024-05-22 DIAGNOSIS — J84.9 INTERSTITIAL PULMONARY DISEASE, UNSPECIFIED: ICD-10-CM

## 2024-05-22 DIAGNOSIS — J84.9 INTERSTITIAL LUNG DISEASE: Primary | ICD-10-CM

## 2024-05-22 DIAGNOSIS — J96.01 ACUTE HYPOXIC RESPIRATORY FAILURE: ICD-10-CM

## 2024-05-22 DIAGNOSIS — R09.02 HYPOXIA: ICD-10-CM

## 2024-05-22 DIAGNOSIS — J96.11 CHRONIC HYPOXEMIC RESPIRATORY FAILURE: ICD-10-CM

## 2024-05-22 PROCEDURE — 99999 PR PBB SHADOW E&M-EST. PATIENT-LVL V: CPT | Mod: PBBFAC,,, | Performed by: INTERNAL MEDICINE

## 2024-05-22 PROCEDURE — 3008F BODY MASS INDEX DOCD: CPT | Mod: CPTII,S$GLB,, | Performed by: INTERNAL MEDICINE

## 2024-05-22 PROCEDURE — 1101F PT FALLS ASSESS-DOCD LE1/YR: CPT | Mod: CPTII,S$GLB,, | Performed by: INTERNAL MEDICINE

## 2024-05-22 PROCEDURE — 99213 OFFICE O/P EST LOW 20 MIN: CPT | Mod: S$GLB,,, | Performed by: INTERNAL MEDICINE

## 2024-05-22 PROCEDURE — 3288F FALL RISK ASSESSMENT DOCD: CPT | Mod: CPTII,S$GLB,, | Performed by: INTERNAL MEDICINE

## 2024-05-22 PROCEDURE — 3078F DIAST BP <80 MM HG: CPT | Mod: CPTII,S$GLB,, | Performed by: INTERNAL MEDICINE

## 2024-05-22 PROCEDURE — 1111F DSCHRG MED/CURRENT MED MERGE: CPT | Mod: CPTII,S$GLB,, | Performed by: INTERNAL MEDICINE

## 2024-05-22 PROCEDURE — 3062F POS MACROALBUMINURIA REV: CPT | Mod: CPTII,S$GLB,, | Performed by: INTERNAL MEDICINE

## 2024-05-22 PROCEDURE — 3051F HG A1C>EQUAL 7.0%<8.0%: CPT | Mod: CPTII,S$GLB,, | Performed by: INTERNAL MEDICINE

## 2024-05-22 PROCEDURE — 3075F SYST BP GE 130 - 139MM HG: CPT | Mod: CPTII,S$GLB,, | Performed by: INTERNAL MEDICINE

## 2024-05-22 PROCEDURE — 1159F MED LIST DOCD IN RCRD: CPT | Mod: CPTII,S$GLB,, | Performed by: INTERNAL MEDICINE

## 2024-05-22 PROCEDURE — 3066F NEPHROPATHY DOC TX: CPT | Mod: CPTII,S$GLB,, | Performed by: INTERNAL MEDICINE

## 2024-05-22 RX ORDER — FLUTICASONE PROPIONATE 50 MCG
1 SPRAY, SUSPENSION (ML) NASAL DAILY
Qty: 18.2 ML | Refills: 0 | Status: SHIPPED | OUTPATIENT
Start: 2024-05-22

## 2024-05-22 RX ORDER — IPRATROPIUM BROMIDE AND ALBUTEROL SULFATE 2.5; .5 MG/3ML; MG/3ML
3 SOLUTION RESPIRATORY (INHALATION) EVERY 6 HOURS PRN
Qty: 75 ML | Refills: 0 | Status: SHIPPED | OUTPATIENT
Start: 2024-05-22 | End: 2025-05-22

## 2024-05-22 NOTE — PROGRESS NOTES
"    Reason for visit:  ILD    Patient ID:  Emmanuel Grant is a 73 y.o. male     History:  Mr. Grant  is a 73-year-old with newly diagnosed interstitial lung disease he has been admitted at the [facility] twice for shortness of breath and was sent home this last time with oxygen.  He had initial autoimmune evaluation that has been negative thus far.  No history of exposure to medications that would cause this that he is aware of, no family history of lung disease.  No previous heart disease.  He does get short of breath with exertion and moving around a good bit but is mostly able to do what he wants to do throughout the day.  He does use a nebulizer he feels that it does seem to help some.    Additional Pulmonary History:  Childhood Illnesses:  none  Occupational:   works in air conditioning in installation and repair  Environmental:   no pets, no seasonal allergies, no carpet in his home and no concern for mold or allergy exposures there  Tobacco/Smoking:   never smoker    Objective:     Vitals:    05/22/24 1334   BP: 132/76   BP Location: Left arm   Patient Position: Sitting   Pulse: 84   SpO2: 95%   Weight: 65.6 kg (144 lb 10 oz)   Height: 5' 8" (1.727 m)         Physical Exam  Constitutional:       General: He is not in acute distress.     Appearance: He is not diaphoretic.   HENT:      Head: Normocephalic and atraumatic.      Right Ear: External ear normal.      Left Ear: External ear normal.   Eyes:      Conjunctiva/sclera: Conjunctivae normal.      Pupils: Pupils are equal, round, and reactive to light.   Neck:      Trachea: No tracheal deviation.   Cardiovascular:      Rate and Rhythm: Normal rate and regular rhythm.      Heart sounds: Normal heart sounds. No murmur heard.  Pulmonary:      Effort: Pulmonary effort is normal. No respiratory distress.      Breath sounds: No stridor. Rales present. No wheezing.   Abdominal:      General: Bowel sounds are normal. There is no distension.      Palpations: " Abdomen is soft.      Tenderness: There is no abdominal tenderness.   Musculoskeletal:         General: Normal range of motion.      Cervical back: Normal range of motion and neck supple.   Skin:     General: Skin is warm and dry.      Findings: No erythema.   Neurological:      Mental Status: He is alert and oriented to person, place, and time.      Gait: Gait is intact.   Psychiatric:         Mood and Affect: Mood and affect normal.         Cognition and Memory: Memory normal.         Judgment: Judgment normal.          Personal Diagnostic Review and Interpretation   CT scans reviewed from the last 2 occurrences and do show in her lobular septal thick getting no evidence of clear UIP pattern at this time we will repeat in a few months      Pertinent Studies Reviewed & Interpreted:     Pulmonary Function Tests:   Pending     Echocardiograms:   Pending         Assessment & Plan:       Problem List Items Addressed This Visit          Pulmonary    Acute hypoxic respiratory failure    Current Assessment & Plan       Has oxygen at home and is using it as needed         Relevant Medications    albuterol-ipratropium (DUO-NEB) 2.5 mg-0.5 mg/3 mL nebulizer solution    Interstitial lung disease - Primary    Current Assessment & Plan      Unclear cause of his ILD however initial autoimmune tests were unrevealing.  We will get a repeat CT scan in a month or 2 as the most recent 1 showed a lot more fluid.    No family history and no previous lung disease.    Can continue nebulizer treatments as needed, it is unlikely the other inhalers would help with this disease process.         Relevant Medications    albuterol-ipratropium (DUO-NEB) 2.5 mg-0.5 mg/3 mL nebulizer solution    Other Relevant Orders    Echo    NEBULIZER KIT (SUPPLIES) FOR HOME USE    NEBULIZER FOR HOME USE    SOB (shortness of breath)    Current Assessment & Plan       This is almost certainly secondary to his lung disease however no echo on file so we will  obtain echocardiogram to ensure that there has no cardiac involvement          Other Visit Diagnoses       Hypoxia        Chronic hypoxemic respiratory failure        Relevant Medications    albuterol-ipratropium (DUO-NEB) 2.5 mg-0.5 mg/3 mL nebulizer solution    Interstitial pulmonary disease, unspecified        Relevant Orders    CT Chest Without Contrast             RETURN TO CLINIC IN 2 MONTHS       Portions of the record may have been created with voice-recognition software. Occasional wrong-word or sound-a-like substitutions may have occurred due to the inherent limitations of voice-recognition software. Read the chart carefully and recognize, using context, where substitutions have occurred.

## 2024-05-23 NOTE — ASSESSMENT & PLAN NOTE
Unclear cause of his ILD however initial autoimmune tests were unrevealing.  We will get a repeat CT scan in a month or 2 as the most recent 1 showed a lot more fluid.    No family history and no previous lung disease.    Can continue nebulizer treatments as needed, it is unlikely the other inhalers would help with this disease process.

## 2024-05-23 NOTE — ASSESSMENT & PLAN NOTE
This is almost certainly secondary to his lung disease however no echo on file so we will obtain echocardiogram to ensure that there has no cardiac involvement

## 2024-06-16 ENCOUNTER — HOSPITAL ENCOUNTER (OUTPATIENT)
Facility: HOSPITAL | Age: 74
Discharge: HOME | End: 2024-06-17
Attending: EMERGENCY MEDICINE | Admitting: HOSPITALIST
Payer: MEDICARE

## 2024-06-16 DIAGNOSIS — R07.9 CHEST PAIN: ICD-10-CM

## 2024-06-16 DIAGNOSIS — R06.02 SOB (SHORTNESS OF BREATH) ON EXERTION: ICD-10-CM

## 2024-06-16 DIAGNOSIS — R06.02 SOB (SHORTNESS OF BREATH): Primary | ICD-10-CM

## 2024-06-16 LAB
ALBUMIN SERPL BCP-MCNC: 3.3 G/DL (ref 3.5–5.2)
ALP SERPL-CCNC: 53 U/L (ref 55–135)
ALT SERPL W/O P-5'-P-CCNC: 20 U/L (ref 10–44)
ANION GAP SERPL CALC-SCNC: 13 MMOL/L (ref 8–16)
AST SERPL-CCNC: 20 U/L (ref 10–40)
BASOPHILS # BLD AUTO: 0.01 K/UL (ref 0–0.2)
BASOPHILS NFR BLD: 0.1 % (ref 0–1.9)
BILIRUB SERPL-MCNC: 0.4 MG/DL (ref 0.1–1)
BNP SERPL-MCNC: 194 PG/ML (ref 0–99)
BUN SERPL-MCNC: 13 MG/DL (ref 8–23)
CALCIUM SERPL-MCNC: 9.8 MG/DL (ref 8.7–10.5)
CHLORIDE SERPL-SCNC: 104 MMOL/L (ref 95–110)
CO2 SERPL-SCNC: 21 MMOL/L (ref 23–29)
CREAT SERPL-MCNC: 1.3 MG/DL (ref 0.5–1.4)
DIFFERENTIAL METHOD BLD: ABNORMAL
EOSINOPHIL # BLD AUTO: 0.4 K/UL (ref 0–0.5)
EOSINOPHIL NFR BLD: 4.7 % (ref 0–8)
ERYTHROCYTE [DISTWIDTH] IN BLOOD BY AUTOMATED COUNT: 12.8 % (ref 11.5–14.5)
EST. GFR  (NO RACE VARIABLE): 59 ML/MIN/1.73 M^2
GLUCOSE SERPL-MCNC: 179 MG/DL (ref 70–110)
HCT VFR BLD AUTO: 38.2 % (ref 40–54)
HGB BLD-MCNC: 13.2 G/DL (ref 14–18)
IMM GRANULOCYTES # BLD AUTO: 0.02 K/UL (ref 0–0.04)
IMM GRANULOCYTES NFR BLD AUTO: 0.2 % (ref 0–0.5)
LYMPHOCYTES # BLD AUTO: 1.7 K/UL (ref 1–4.8)
LYMPHOCYTES NFR BLD: 20.6 % (ref 18–48)
MCH RBC QN AUTO: 28.5 PG (ref 27–31)
MCHC RBC AUTO-ENTMCNC: 34.6 G/DL (ref 32–36)
MCV RBC AUTO: 83 FL (ref 82–98)
MONOCYTES # BLD AUTO: 0.6 K/UL (ref 0.3–1)
MONOCYTES NFR BLD: 7.7 % (ref 4–15)
NEUTROPHILS # BLD AUTO: 5.5 K/UL (ref 1.8–7.7)
NEUTROPHILS NFR BLD: 66.7 % (ref 38–73)
NRBC BLD-RTO: 0 /100 WBC
PLATELET # BLD AUTO: 199 K/UL (ref 150–450)
PMV BLD AUTO: 9.9 FL (ref 9.2–12.9)
POCT GLUCOSE: 175 MG/DL (ref 70–110)
POCT GLUCOSE: 79 MG/DL (ref 70–110)
POCT GLUCOSE: 83 MG/DL (ref 70–110)
POTASSIUM SERPL-SCNC: 3.9 MMOL/L (ref 3.5–5.1)
PROT SERPL-MCNC: 6.9 G/DL (ref 6–8.4)
RBC # BLD AUTO: 4.63 M/UL (ref 4.6–6.2)
SODIUM SERPL-SCNC: 138 MMOL/L (ref 136–145)
TROPONIN I SERPL DL<=0.01 NG/ML-MCNC: 0.01 NG/ML (ref 0–0.03)
TROPONIN I SERPL DL<=0.01 NG/ML-MCNC: 0.02 NG/ML (ref 0–0.03)
WBC # BLD AUTO: 8.26 K/UL (ref 3.9–12.7)

## 2024-06-16 PROCEDURE — 25000003 PHARM REV CODE 250: Performed by: PHYSICIAN ASSISTANT

## 2024-06-16 PROCEDURE — 84484 ASSAY OF TROPONIN QUANT: CPT | Mod: 91 | Performed by: PHYSICIAN ASSISTANT

## 2024-06-16 PROCEDURE — 93005 ELECTROCARDIOGRAM TRACING: CPT

## 2024-06-16 PROCEDURE — 63600175 PHARM REV CODE 636 W HCPCS: Performed by: PHYSICIAN ASSISTANT

## 2024-06-16 PROCEDURE — G0378 HOSPITAL OBSERVATION PER HR: HCPCS

## 2024-06-16 PROCEDURE — 99285 EMERGENCY DEPT VISIT HI MDM: CPT | Mod: 25

## 2024-06-16 PROCEDURE — 83036 HEMOGLOBIN GLYCOSYLATED A1C: CPT | Performed by: PHYSICIAN ASSISTANT

## 2024-06-16 PROCEDURE — 93010 ELECTROCARDIOGRAM REPORT: CPT | Mod: ,,, | Performed by: INTERNAL MEDICINE

## 2024-06-16 PROCEDURE — 85025 COMPLETE CBC W/AUTO DIFF WBC: CPT | Performed by: EMERGENCY MEDICINE

## 2024-06-16 PROCEDURE — 83880 ASSAY OF NATRIURETIC PEPTIDE: CPT | Performed by: EMERGENCY MEDICINE

## 2024-06-16 PROCEDURE — 82962 GLUCOSE BLOOD TEST: CPT

## 2024-06-16 PROCEDURE — 36415 COLL VENOUS BLD VENIPUNCTURE: CPT | Performed by: PHYSICIAN ASSISTANT

## 2024-06-16 PROCEDURE — 96374 THER/PROPH/DIAG INJ IV PUSH: CPT

## 2024-06-16 PROCEDURE — 80053 COMPREHEN METABOLIC PANEL: CPT | Performed by: EMERGENCY MEDICINE

## 2024-06-16 PROCEDURE — 84484 ASSAY OF TROPONIN QUANT: CPT | Performed by: EMERGENCY MEDICINE

## 2024-06-16 RX ORDER — ATORVASTATIN CALCIUM 40 MG/1
40 TABLET, FILM COATED ORAL NIGHTLY
Status: DISCONTINUED | OUTPATIENT
Start: 2024-06-16 | End: 2024-06-17 | Stop reason: HOSPADM

## 2024-06-16 RX ORDER — HYDRALAZINE HYDROCHLORIDE 25 MG/1
25 TABLET, FILM COATED ORAL EVERY 8 HOURS PRN
Status: DISCONTINUED | OUTPATIENT
Start: 2024-06-16 | End: 2024-06-17

## 2024-06-16 RX ORDER — AMLODIPINE BESYLATE 5 MG/1
5 TABLET ORAL DAILY
Status: DISCONTINUED | OUTPATIENT
Start: 2024-06-17 | End: 2024-06-17

## 2024-06-16 RX ORDER — IBUPROFEN 200 MG
24 TABLET ORAL
Status: DISCONTINUED | OUTPATIENT
Start: 2024-06-16 | End: 2024-06-17 | Stop reason: HOSPADM

## 2024-06-16 RX ORDER — SODIUM CHLORIDE 0.9 % (FLUSH) 0.9 %
10 SYRINGE (ML) INJECTION EVERY 8 HOURS
Status: DISCONTINUED | OUTPATIENT
Start: 2024-06-16 | End: 2024-06-16

## 2024-06-16 RX ORDER — TALC
6 POWDER (GRAM) TOPICAL NIGHTLY PRN
Status: DISCONTINUED | OUTPATIENT
Start: 2024-06-16 | End: 2024-06-17 | Stop reason: HOSPADM

## 2024-06-16 RX ORDER — HYDRALAZINE HYDROCHLORIDE 25 MG/1
25 TABLET, FILM COATED ORAL ONCE
Status: COMPLETED | OUTPATIENT
Start: 2024-06-16 | End: 2024-06-16

## 2024-06-16 RX ORDER — LANOLIN ALCOHOL/MO/W.PET/CERES
800 CREAM (GRAM) TOPICAL
Status: DISCONTINUED | OUTPATIENT
Start: 2024-06-16 | End: 2024-06-17 | Stop reason: HOSPADM

## 2024-06-16 RX ORDER — HYDRALAZINE HYDROCHLORIDE 20 MG/ML
10 INJECTION INTRAMUSCULAR; INTRAVENOUS ONCE
Status: COMPLETED | OUTPATIENT
Start: 2024-06-16 | End: 2024-06-16

## 2024-06-16 RX ORDER — AMOXICILLIN 250 MG
1 CAPSULE ORAL DAILY PRN
Status: DISCONTINUED | OUTPATIENT
Start: 2024-06-16 | End: 2024-06-17 | Stop reason: HOSPADM

## 2024-06-16 RX ORDER — TAMSULOSIN HYDROCHLORIDE 0.4 MG/1
0.4 CAPSULE ORAL DAILY
Status: DISCONTINUED | OUTPATIENT
Start: 2024-06-17 | End: 2024-06-17 | Stop reason: HOSPADM

## 2024-06-16 RX ORDER — NAPROXEN SODIUM 220 MG/1
81 TABLET, FILM COATED ORAL DAILY
Status: DISCONTINUED | OUTPATIENT
Start: 2024-06-17 | End: 2024-06-17 | Stop reason: HOSPADM

## 2024-06-16 RX ORDER — IBUPROFEN 200 MG
16 TABLET ORAL
Status: DISCONTINUED | OUTPATIENT
Start: 2024-06-16 | End: 2024-06-17 | Stop reason: HOSPADM

## 2024-06-16 RX ORDER — BENZONATATE 100 MG/1
100 CAPSULE ORAL 3 TIMES DAILY PRN
Status: DISCONTINUED | OUTPATIENT
Start: 2024-06-17 | End: 2024-06-17 | Stop reason: HOSPADM

## 2024-06-16 RX ORDER — CARVEDILOL 12.5 MG/1
25 TABLET ORAL 2 TIMES DAILY WITH MEALS
Status: DISCONTINUED | OUTPATIENT
Start: 2024-06-16 | End: 2024-06-17 | Stop reason: HOSPADM

## 2024-06-16 RX ORDER — CLONIDINE HYDROCHLORIDE 0.1 MG/1
0.1 TABLET ORAL ONCE
Status: DISCONTINUED | OUTPATIENT
Start: 2024-06-16 | End: 2024-06-16

## 2024-06-16 RX ORDER — ACETAMINOPHEN 325 MG/1
650 TABLET ORAL EVERY 4 HOURS PRN
Status: DISCONTINUED | OUTPATIENT
Start: 2024-06-16 | End: 2024-06-17 | Stop reason: HOSPADM

## 2024-06-16 RX ORDER — ASPIRIN 325 MG
325 TABLET, DELAYED RELEASE (ENTERIC COATED) ORAL
Status: DISCONTINUED | OUTPATIENT
Start: 2024-06-16 | End: 2024-06-16

## 2024-06-16 RX ORDER — SODIUM CHLORIDE 0.9 % (FLUSH) 0.9 %
10 SYRINGE (ML) INJECTION
Status: DISCONTINUED | OUTPATIENT
Start: 2024-06-16 | End: 2024-06-17 | Stop reason: HOSPADM

## 2024-06-16 RX ORDER — GLUCAGON 1 MG
1 KIT INJECTION
Status: DISCONTINUED | OUTPATIENT
Start: 2024-06-16 | End: 2024-06-17 | Stop reason: HOSPADM

## 2024-06-16 RX ORDER — INSULIN ASPART 100 [IU]/ML
0-5 INJECTION, SOLUTION INTRAVENOUS; SUBCUTANEOUS
Status: DISCONTINUED | OUTPATIENT
Start: 2024-06-16 | End: 2024-06-17 | Stop reason: HOSPADM

## 2024-06-16 RX ORDER — NALOXONE HCL 0.4 MG/ML
0.02 VIAL (ML) INJECTION
Status: DISCONTINUED | OUTPATIENT
Start: 2024-06-16 | End: 2024-06-17 | Stop reason: HOSPADM

## 2024-06-16 RX ADMIN — CARVEDILOL 25 MG: 12.5 TABLET, FILM COATED ORAL at 03:06

## 2024-06-16 RX ADMIN — ATORVASTATIN CALCIUM 40 MG: 40 TABLET, FILM COATED ORAL at 08:06

## 2024-06-16 RX ADMIN — HYDRALAZINE HYDROCHLORIDE 25 MG: 25 TABLET ORAL at 05:06

## 2024-06-16 RX ADMIN — CARVEDILOL 25 MG: 12.5 TABLET, FILM COATED ORAL at 05:06

## 2024-06-16 RX ADMIN — HYDRALAZINE HYDROCHLORIDE 10 MG: 20 INJECTION INTRAMUSCULAR; INTRAVENOUS at 09:06

## 2024-06-16 RX ADMIN — BENZONATATE 100 MG: 100 CAPSULE ORAL at 11:06

## 2024-06-16 RX ADMIN — HYDRALAZINE HYDROCHLORIDE 25 MG: 25 TABLET ORAL at 04:06

## 2024-06-16 NOTE — NURSING
Patient arrived to floor via wheelchair via transporter from ED. Patient transferred to bed independently.  AAOX4.  Patient was oriented to room, information on whiteboard, and medication regimen.  Bed low, adequate lighting provided, side rails x2 up, call bell within reach.  Admission assessment completed. Patient denied having any acute distress at this time.  None observed.  Will continue to monitor and follow treatment plan.   [facility]  Nurses Note -- 4 Eyes      6/16/2024       Skin assessed on: Admit      [x] No Pressure Injuries Present    []Prevention Measures Documented    [] Yes LDA  for Pressure Injury Previously documented     [] Yes New Pressure Injury Discovered   [] LDA for New Pressure Injury Added      Attending RN:  Nisha Barajas RN     Second RN:  MARKOS Juarez

## 2024-06-16 NOTE — HPI
Emmanuel Grant 73 y.o. male with CAD, HTN, DM, HLD, ILD on home oxygen presents hospital chief complaint shortness of breath.  Reports he developed shortness of breath after ambulating to restaurant this morning to eat pancakes.  He also developed sharp left-sided chest pain without radiation.  The symptoms improved with supplemental oxygen in the emergency room.  He reports he did not wear oxygen when he went to the restaurant for breakfast as he was unaware he was to wear continuously.  He denies fever nausea vomiting abdominal pain leg swelling melena hematuria hematemesis and syncope.    In the ED, afebrile without leukocytosis troponin negative EKG without ST elevation chest x-ray with chronic lung findings.

## 2024-06-16 NOTE — SUBJECTIVE & OBJECTIVE
"Past Medical History:   Diagnosis Date    CAD (coronary artery disease)     Sees [facility], h/o stent    Diabetes mellitus     Hypertension     Kidney stone     Stroke     age 62       Past Surgical History:   Procedure Laterality Date    CARDIAC CATHETERIZATION      with stent    COLONOSCOPY N/A 03/27/2024    Procedure: COLONOSCOPY;  Surgeon: Ariela Castro MD;  Location: [facility] ENDO;  Service: Endoscopy;  Laterality: N/A;    ESOPHAGOGASTRODUODENOSCOPY N/A 03/27/2024    Procedure: EGD (ESOPHAGOGASTRODUODENOSCOPY);  Surgeon: Ariela Castro MD;  Location: [facility] ENDO;  Service: Endoscopy;  Laterality: N/A;    SHOULDER SURGERY Right        Review of patient's allergies indicates:   Allergen Reactions    Dapagliflozin      Other reaction(s): Other (See Comments)    Pcn [penicillins]     Linagliptin Other (See Comments)     "it knocked me down", "it almost killed me"    Lisinopril Other (See Comments)     cough    Pantoprazole Hives       No current facility-administered medications on file prior to encounter.     Current Outpatient Medications on File Prior to Encounter   Medication Sig    albuterol-ipratropium (DUO-NEB) 2.5 mg-0.5 mg/3 mL nebulizer solution Take 3 mLs by nebulization every 6 (six) hours as needed for Wheezing. Rescue    alpha-D-galactosidase (BEANO ORAL) Take 1 tablet by mouth 2 (two) times a day.    amLODIPine (NORVASC) 5 MG tablet Take 5 mg by mouth once daily.    aspirin 81 MG Chew Take 81 mg by mouth once daily.    atorvastatin (LIPITOR) 40 MG tablet Take 1 tablet (40 mg total) by mouth every evening.    blood sugar diagnostic Strp To check BG 1 times daily, to use with insurance preferred meter    blood-glucose meter kit To check BG 1 times daily, to use with insurance preferred meter    carvediloL (COREG) 25 MG tablet Take 1 tablet (25 mg total) by mouth 2 (two) times daily with meals.    cholecalciferol, vitamin D3, (VITAMIN D3) 50 mcg (2,000 unit) Cap capsule 1 capsule.    famotidine " (PEPCID) 20 MG tablet TAKE 1 TABLET(20 MG) BY MOUTH TWICE DAILY    fluticasone propionate (FLONASE) 50 mcg/actuation nasal spray 1 spray in each nostril Nasally Once a day    fluticasone propionate (FLONASE) 50 mcg/actuation nasal spray 1 spray (50 mcg total) by Each Nostril route once daily.    lancets Misc To check BG 1 times daily, to use with insurance preferred meter    latanoprost 0.005 % ophthalmic solution Place 1 drop into both eyes every evening.    magnesium citrate solution Take half the bottle for constipation as needed.    metFORMIN (GLUCOPHAGE) 1000 MG tablet Take 0.5 tablets (500 mg total) by mouth daily with breakfast. (Patient taking differently: Take 1,000 mg by mouth 2 (two) times daily with meals.)    montelukast (SINGULAIR) 10 mg tablet Take 10 mg by mouth every evening.    peg 400/hypromellose/glycerin (DRY EYE RELIEF OPHT) Apply to eye.    polyethylene glycol (GLYCOLAX) 17 gram PwPk Take by mouth.    senna-docusate 8.6-50 mg (SENNA WITH DOCUSATE SODIUM) 8.6-50 mg per tablet Take 1 tablet by mouth once daily.    sucralfate (CARAFATE) 1 gram tablet Take 1 g by mouth 4 (four) times daily.    tamsulosin (FLOMAX) 0.4 mg Cap Take 1 capsule (0.4 mg total) by mouth once daily.     Family History       Problem Relation (Age of Onset)    Cancer Mother    Colon cancer Sister          Tobacco Use    Smoking status: Never     Passive exposure: Never    Smokeless tobacco: Never   Substance and Sexual Activity    Alcohol use: No    Drug use: Never    Sexual activity: Not Currently     Review of Systems   Constitutional:  Negative for chills and fever.   HENT:  Negative for nosebleeds and tinnitus.    Eyes:  Negative for photophobia and visual disturbance.   Respiratory:  Positive for shortness of breath. Negative for wheezing.    Cardiovascular:  Positive for chest pain. Negative for palpitations and leg swelling.   Gastrointestinal:  Negative for abdominal distention, nausea and vomiting.   Genitourinary:   Negative for dysuria, flank pain and hematuria.   Musculoskeletal:  Negative for gait problem and joint swelling.   Skin:  Negative for rash and wound.   Neurological:  Negative for seizures and syncope.     Objective:     Vital Signs (Most Recent):  Temp: 97.9 °F (36.6 °C) (06/16/24 1047)  Pulse: 75 (06/16/24 1443)  Resp: (!) 22 (06/16/24 1443)  BP: (!) 179/109 (06/16/24 1432)  SpO2: 95 % (06/16/24 1443) Vital Signs (24h Range):  Temp:  [97.9 °F (36.6 °C)] 97.9 °F (36.6 °C)  Pulse:  [75-90] 75  Resp:  [18-22] 22  SpO2:  [91 %-96 %] 95 %  BP: (147-179)/() 179/109     Weight: 68 kg (150 lb)  Body mass index is 22.81 kg/m².     Physical Exam  Vitals and nursing note reviewed.   Constitutional:       General: He is not in acute distress.     Appearance: He is well-developed. He is not diaphoretic.   HENT:      Head: Normocephalic and atraumatic.      Right Ear: External ear normal.      Left Ear: External ear normal.   Eyes:      General:         Right eye: No discharge.         Left eye: No discharge.      Conjunctiva/sclera: Conjunctivae normal.   Neck:      Thyroid: No thyromegaly.   Cardiovascular:      Rate and Rhythm: Normal rate and regular rhythm.      Heart sounds: No murmur heard.  Pulmonary:      Effort: Pulmonary effort is normal. No respiratory distress.      Breath sounds: Normal breath sounds.      Comments: On supplemental oxygen speakign in full sentences  Abdominal:      General: Bowel sounds are normal. There is no distension.      Palpations: Abdomen is soft. There is no mass.      Tenderness: There is no abdominal tenderness.   Musculoskeletal:         General: No deformity.      Cervical back: Normal range of motion and neck supple.      Right lower leg: No edema.      Left lower leg: No edema.   Skin:     General: Skin is warm and dry.   Neurological:      Mental Status: He is alert and oriented to person, place, and time.      Sensory: No sensory deficit.   Psychiatric:         Mood and  Affect: Mood normal.         Behavior: Behavior normal.                Significant Labs: CBC:   Recent Labs   Lab 06/16/24  1212   WBC 8.26   HGB 13.2*   HCT 38.2*        CMP:   Recent Labs   Lab 06/16/24  1212      K 3.9      CO2 21*   *   BUN 13   CREATININE 1.3   CALCIUM 9.8   PROT 6.9   ALBUMIN 3.3*   BILITOT 0.4   ALKPHOS 53*   AST 20   ALT 20   ANIONGAP 13     Cardiac Markers:   Recent Labs   Lab 06/16/24  1212   *     Troponin:   Recent Labs   Lab 06/16/24  1212   TROPONINI 0.010       Significant Imaging:   Imaging Results              X-Ray Chest AP Portable (Final result)  Result time 06/16/24 11:55:53      Final result by Yasmine Acosta MD (06/16/24 11:55:53)                   Impression:      Chronic lung findings, no definite acute intrathoracic process seen.      Electronically signed by: Yasmine Acosta MD  Date:    06/16/2024  Time:    11:55               Narrative:    EXAMINATION:  XR CHEST AP PORTABLE    CLINICAL HISTORY:  Chest Pain;    TECHNIQUE:  Single frontal view of the chest was performed.    COMPARISON:  05/01/2024    FINDINGS:  Low lung volume, poor inspiratory effort.    The cardiac silhouette is normal in size, midline.    There is increased interstitial lung markings, both lung fields, chronic.  No acute superimposed focal area of airspace consolidation.    No pleural effusion.  No pneumothorax.    The osseous structures appear normal.

## 2024-06-16 NOTE — ASSESSMENT & PLAN NOTE
Echo 2/2024 with EF of 40% and grade 1 DD 2/2024 per care everywhere.   Continue home coreg  Repeat echo

## 2024-06-16 NOTE — ADMISSIONCARE
AdmissionCare    Guideline: Chest Pain - OBS, Observation    Based on the indications selected for the patient, the bed status of Observation was determined to be MET    The following indications were selected as present at the time of evaluation of the patient:      - Observation Care Admission Criteria            - Observation care is indicated for 1 or more of the following:      - Patient classified as intermediate risk or high risk for acute coronary syndrome (eg, via use of a clinical decision tool or risk calculator (eg, HEART score greater than 3))    AdmissionCare documentation entered by: Lynn Storey    [facility] [employer], 28th edition, Copyright © 2024 [facility] [employer], [facility] All Rights Reserved.  [id number]:29:04-05:00

## 2024-06-16 NOTE — H&P
[facility] Medicine  History & Physical    Patient Name: Emmanuel Grant  MRN: [id number]  Patient Class: OP- Observation  Admission Date: 6/16/2024  Attending Physician: Nargis Parr MD   Primary Care Provider: Payam Vu Jr., NP         Patient information was obtained from patient, past medical records, and ER records.     Subjective:     Principal Problem:Chest pain    Chief Complaint:   Chief Complaint   Patient presents with    Shortness of Breath     Pt c/o SOB x 1 year and states his symptoms worsened today. Pt states he has O2 at home and wears it as needed but is not sure how many liters and is not sure if he is supposed to be on O2 continuously. Pt denies cp, n/v/d.        HPI: Emmanuel Grant 73 y.o. male with CAD, HTN, DM, HLD, ILD on home oxygen presents hospital chief complaint shortness of breath.  Reports he developed shortness of breath after ambulating to restaurant this morning to eat pancakes.  He also developed sharp left-sided chest pain without radiation.  The symptoms improved with supplemental oxygen in the emergency room.  He reports he did not wear oxygen when he went to the restaurant for breakfast as he was unaware he was to wear continuously.  He denies fever nausea vomiting abdominal pain leg swelling melena hematuria hematemesis and syncope.    In the ED, afebrile without leukocytosis troponin negative EKG without ST elevation chest x-ray with chronic lung findings.    Past Medical History:   Diagnosis Date    CAD (coronary artery disease)     Sees [facility], h/o stent    Diabetes mellitus     Hypertension     Kidney stone     Stroke     age 62       Past Surgical History:   Procedure Laterality Date    CARDIAC CATHETERIZATION      with stent    COLONOSCOPY N/A 03/27/2024    Procedure: COLONOSCOPY;  Surgeon: Ariela Castro MD;  Location: [facility];  Service: Endoscopy;  Laterality: N/A;    ESOPHAGOGASTRODUODENOSCOPY N/A 03/27/2024    Procedure: EGD  "(ESOPHAGOGASTRODUODENOSCOPY);  Surgeon: Ariela Castro MD;  Location: [facility];  Service: Endoscopy;  Laterality: N/A;    SHOULDER SURGERY Right        Review of patient's allergies indicates:   Allergen Reactions    Dapagliflozin      Other reaction(s): Other (See Comments)    Pcn [penicillins]     Linagliptin Other (See Comments)     "it knocked me down", "it almost killed me"    Lisinopril Other (See Comments)     cough    Pantoprazole Hives       No current facility-administered medications on file prior to encounter.     Current Outpatient Medications on File Prior to Encounter   Medication Sig    albuterol-ipratropium (DUO-NEB) 2.5 mg-0.5 mg/3 mL nebulizer solution Take 3 mLs by nebulization every 6 (six) hours as needed for Wheezing. Rescue    alpha-D-galactosidase (BEANO ORAL) Take 1 tablet by mouth 2 (two) times a day.    amLODIPine (NORVASC) 5 MG tablet Take 5 mg by mouth once daily.    aspirin 81 MG Chew Take 81 mg by mouth once daily.    atorvastatin (LIPITOR) 40 MG tablet Take 1 tablet (40 mg total) by mouth every evening.    blood sugar diagnostic Strp To check BG 1 times daily, to use with insurance preferred meter    blood-glucose meter kit To check BG 1 times daily, to use with insurance preferred meter    carvediloL (COREG) 25 MG tablet Take 1 tablet (25 mg total) by mouth 2 (two) times daily with meals.    cholecalciferol, vitamin D3, (VITAMIN D3) 50 mcg (2,000 unit) Cap capsule 1 capsule.    famotidine (PEPCID) 20 MG tablet TAKE 1 TABLET(20 MG) BY MOUTH TWICE DAILY    fluticasone propionate (FLONASE) 50 mcg/actuation nasal spray 1 spray in each nostril Nasally Once a day    fluticasone propionate (FLONASE) 50 mcg/actuation nasal spray 1 spray (50 mcg total) by Each Nostril route once daily.    lancets Misc To check BG 1 times daily, to use with insurance preferred meter    latanoprost 0.005 % ophthalmic solution Place 1 drop into both eyes every evening.    magnesium citrate solution " Take half the bottle for constipation as needed.    metFORMIN (GLUCOPHAGE) 1000 MG tablet Take 0.5 tablets (500 mg total) by mouth daily with breakfast. (Patient taking differently: Take 1,000 mg by mouth 2 (two) times daily with meals.)    montelukast (SINGULAIR) 10 mg tablet Take 10 mg by mouth every evening.    peg 400/hypromellose/glycerin (DRY EYE RELIEF OPHT) Apply to eye.    polyethylene glycol (GLYCOLAX) 17 gram PwPk Take by mouth.    senna-docusate 8.6-50 mg (SENNA WITH DOCUSATE SODIUM) 8.6-50 mg per tablet Take 1 tablet by mouth once daily.    sucralfate (CARAFATE) 1 gram tablet Take 1 g by mouth 4 (four) times daily.    tamsulosin (FLOMAX) 0.4 mg Cap Take 1 capsule (0.4 mg total) by mouth once daily.     Family History       Problem Relation (Age of Onset)    Cancer Mother    Colon cancer Sister          Tobacco Use    Smoking status: Never     Passive exposure: Never    Smokeless tobacco: Never   Substance and Sexual Activity    Alcohol use: No    Drug use: Never    Sexual activity: Not Currently     Review of Systems   Constitutional:  Negative for chills and fever.   HENT:  Negative for nosebleeds and tinnitus.    Eyes:  Negative for photophobia and visual disturbance.   Respiratory:  Positive for shortness of breath. Negative for wheezing.    Cardiovascular:  Positive for chest pain. Negative for palpitations and leg swelling.   Gastrointestinal:  Negative for abdominal distention, nausea and vomiting.   Genitourinary:  Negative for dysuria, flank pain and hematuria.   Musculoskeletal:  Negative for gait problem and joint swelling.   Skin:  Negative for rash and wound.   Neurological:  Negative for seizures and syncope.     Objective:     Vital Signs (Most Recent):  Temp: 97.9 °F (36.6 °C) (06/16/24 1047)  Pulse: 75 (06/16/24 1443)  Resp: (!) 22 (06/16/24 1443)  BP: (!) 179/109 (06/16/24 1432)  SpO2: 95 % (06/16/24 1443) Vital Signs (24h Range):  Temp:  [97.9 °F (36.6 °C)] 97.9 °F (36.6 °C)  Pulse:   [75-90] 75  Resp:  [18-22] 22  SpO2:  [91 %-96 %] 95 %  BP: (147-179)/() 179/109     Weight: 68 kg (150 lb)  Body mass index is 22.81 kg/m².     Physical Exam  Vitals and nursing note reviewed.   Constitutional:       General: He is not in acute distress.     Appearance: He is well-developed. He is not diaphoretic.   HENT:      Head: Normocephalic and atraumatic.      Right Ear: External ear normal.      Left Ear: External ear normal.   Eyes:      General:         Right eye: No discharge.         Left eye: No discharge.      Conjunctiva/sclera: Conjunctivae normal.   Neck:      Thyroid: No thyromegaly.   Cardiovascular:      Rate and Rhythm: Normal rate and regular rhythm.      Heart sounds: No murmur heard.  Pulmonary:      Effort: Pulmonary effort is normal. No respiratory distress.      Breath sounds: Normal breath sounds.      Comments: On supplemental oxygen speakign in full sentences  Abdominal:      General: Bowel sounds are normal. There is no distension.      Palpations: Abdomen is soft. There is no mass.      Tenderness: There is no abdominal tenderness.   Musculoskeletal:         General: No deformity.      Cervical back: Normal range of motion and neck supple.      Right lower leg: No edema.      Left lower leg: No edema.   Skin:     General: Skin is warm and dry.   Neurological:      Mental Status: He is alert and oriented to person, place, and time.      Sensory: No sensory deficit.   Psychiatric:         Mood and Affect: Mood normal.         Behavior: Behavior normal.                Significant Labs: CBC:   Recent Labs   Lab 06/16/24  1212   WBC 8.26   HGB 13.2*   HCT 38.2*        CMP:   Recent Labs   Lab 06/16/24  1212      K 3.9      CO2 21*   *   BUN 13   CREATININE 1.3   CALCIUM 9.8   PROT 6.9   ALBUMIN 3.3*   BILITOT 0.4   ALKPHOS 53*   AST 20   ALT 20   ANIONGAP 13     Cardiac Markers:   Recent Labs   Lab 06/16/24  1212   *     Troponin:   Recent Labs   Lab  06/16/24  1212   TROPONINI 0.010       Significant Imaging:   Imaging Results              X-Ray Chest AP Portable (Final result)  Result time 06/16/24 11:55:53      Final result by Yasmine Acosta MD (06/16/24 11:55:53)                   Impression:      Chronic lung findings, no definite acute intrathoracic process seen.      Electronically signed by: Yasmine Acosta MD  Date:    06/16/2024  Time:    11:55               Narrative:    EXAMINATION:  XR CHEST AP PORTABLE    CLINICAL HISTORY:  Chest Pain;    TECHNIQUE:  Single frontal view of the chest was performed.    COMPARISON:  05/01/2024    FINDINGS:  Low lung volume, poor inspiratory effort.    The cardiac silhouette is normal in size, midline.    There is increased interstitial lung markings, both lung fields, chronic.  No acute superimposed focal area of airspace consolidation.    No pleural effusion.  No pneumothorax.    The osseous structures appear normal.                                      Assessment/Plan:     * Chest pain  Developed CP/SOB after going to restaurant this morning without home oxygen. EKG without ST elevation, troponin negative, chest x-ray with chronic lung findings.   Continue home aspirin/statin  Troponin trend  Telemetry  Echo  Cardilogy consulted    Chronic HFrEF (heart failure with reduced ejection fraction)  Echo 2/2024 with EF of 40% and grade 1 DD 2/2024 per care everywhere.   Continue home coreg  Repeat echo    Hyperlipidemia  Continue home statin    Essential hypertension  Chronic, controlled. Latest blood pressure and vitals reviewed-     Temp:  [97.9 °F (36.6 °C)]   Pulse:  [75-90]   Resp:  [18-22]   BP: (147-179)/()   SpO2:  [91 %-96 %] .   Home meds for hypertension were reviewed and noted below.   Hypertension Medications               amLODIPine (NORVASC) 5 MG tablet Take 5 mg by mouth once daily.    carvediloL (COREG) 25 MG tablet Take 1 tablet (25 mg total) by mouth 2 (two) times daily with meals.       "      While in the hospital, will manage blood pressure as follows; Continue home antihypertensive regimen    Will utilize p.r.n. blood pressure medication only if patient's blood pressure greater than 180/110 and he develops symptoms such as worsening chest pain or shortness of breath.    BPH (benign prostatic hyperplasia)  Continue home flomax    Coronary artery disease  Patient with known CAD s/p stent placement, which is controlled Will continue ASA and Statin and monitor for S/Sx of angina/ACS. Continue to monitor on telemetry.     Interstitial lung disease  As above. Continue home oxygen and pulmonary follow up    Type 2 diabetes mellitus without complication, without long-term current use of insulin  Patient's FSGs are controlled on current medication regimen.  Last A1c reviewed-   Lab Results   Component Value Date    HGBA1C 7.0 (H) 02/18/2024     Most recent fingerstick glucose reviewed- No results for input(s): "POCTGLUCOSE" in the last 24 hours.  Current correctional scale  Low  Maintain anti-hyperglycemic dose as follows-   Antihyperglycemics (From admission, onward)      Start     Stop Route Frequency Ordered    06/16/24 1528  insulin aspart U-100 pen 0-5 Units         -- SubQ Before meals & nightly PRN 06/16/24 1428          Hold Oral hypoglycemics while patient is in the hospital.      VTE Risk Mitigation (From admission, onward)           Ordered     Place MANE hose  Until discontinued         06/16/24 1432     IP VTE HIGH RISK PATIENT  Once         06/16/24 1432     Place sequential compression device  Until discontinued         06/16/24 1432                     Discussed with ED physician.    VTE: mane/scd  Code: full  Diet: diabetic  Dispo: pending troponin trend and cardiology eval  On 06/16/2024, patient should be placed in hospital observation services under my care in collaboration with Nargis Parr MD.      AdmissionCare    Guideline: Chest Pain - OBS, Observation    Based on the " indications selected for the patient, the bed status of Observation was determined to be MET    The following indications were selected as present at the time of evaluation of the patient:      - Observation Care Admission Criteria            - Observation care is indicated for 1 or more of the following:      - Patient classified as intermediate risk or high risk for acute coronary syndrome (eg, via use of a clinical decision tool or risk calculator (eg, HEART score greater than 3))    AdmissionCare documentation entered by: Lynn Storey    [facility], 28th edition, Copyright © 2024 [facility] [employer], [facility] All Rights Reserved.  [id number]:29:04-05:00    Ousmane Alcantar PA-C  Department of Hospital Medicine  [facility] - Emergency Dept

## 2024-06-16 NOTE — ASSESSMENT & PLAN NOTE
"Patient's FSGs are controlled on current medication regimen.  Last A1c reviewed-   Lab Results   Component Value Date    HGBA1C 7.0 (H) 02/18/2024     Most recent fingerstick glucose reviewed- No results for input(s): "POCTGLUCOSE" in the last 24 hours.  Current correctional scale  Low  Maintain anti-hyperglycemic dose as follows-   Antihyperglycemics (From admission, onward)      Start     Stop Route Frequency Ordered    06/16/24 1528  insulin aspart U-100 pen 0-5 Units         -- SubQ Before meals & nightly PRN 06/16/24 1428          Hold Oral hypoglycemics while patient is in the hospital.  "

## 2024-06-16 NOTE — NURSING
Pt /107. prn hydralazine was given in the ED at 1619. MD notified with new orders placed. BP reassessed at 182/92. MD notified.

## 2024-06-16 NOTE — ED PROVIDER NOTES
"Encounter Date: 6/16/2024       History     Chief Complaint   Patient presents with    Shortness of Breath     Pt c/o SOB x 1 year and states his symptoms worsened today. Pt states he has O2 at home and wears it as needed but is not sure how many liters and is not sure if he is supposed to be on O2 continuously. Pt denies cp, n/v/d.     73-year-old male with history of CAD, diabetes, hypertension presenting with episode of shortness of breath and chest pain earlier today.  Patient reports symptoms lasted acutely worse for about 10 minutes this morning.  Reports he has baseline dyspnea with exertion but this morning was worse than usual.  Notes mild cough, epigastric chest discomfort and pressure, mild nausea.  Denies fevers, chills, productive cough.  Denies lower extremity edema.  Patient states he is supposed be on oxygen as needed at home but does not use it all the time.      Review of patient's allergies indicates:   Allergen Reactions    Dapagliflozin      Other reaction(s): Other (See Comments)    Pcn [penicillins]     Linagliptin Other (See Comments)     "it knocked me down", "it almost killed me"    Lisinopril Other (See Comments)     cough    Pantoprazole Hives     Past Medical History:   Diagnosis Date    CAD (coronary artery disease)     Sees [facility], h/o stent    Diabetes mellitus     Hypertension     Kidney stone     Stroke     age 62     Past Surgical History:   Procedure Laterality Date    CARDIAC CATHETERIZATION      with stent    COLONOSCOPY N/A 03/27/2024    Procedure: COLONOSCOPY;  Surgeon: Ariela Castro MD;  Location: [facility];  Service: Endoscopy;  Laterality: N/A;    ESOPHAGOGASTRODUODENOSCOPY N/A 03/27/2024    Procedure: EGD (ESOPHAGOGASTRODUODENOSCOPY);  Surgeon: Ariela Castro MD;  Location: [facility];  Service: Endoscopy;  Laterality: N/A;    SHOULDER SURGERY Right      Family History   Problem Relation Name Age of Onset    Cancer Mother      Colon cancer Sister      " Esophageal cancer Neg Hx       Social History     Tobacco Use    Smoking status: Never     Passive exposure: Never    Smokeless tobacco: Never   Substance Use Topics    Alcohol use: No    Drug use: Never     Review of Systems   Constitutional:  Negative for chills and fever.   Respiratory:  Positive for shortness of breath.    Cardiovascular:  Positive for chest pain.   Gastrointestinal:  Positive for nausea. Negative for abdominal pain and vomiting.   Genitourinary:  Negative for dysuria.   Musculoskeletal:  Negative for back pain.   Skin:  Negative for rash.   Neurological:  Negative for weakness.       Physical Exam     Initial Vitals [06/16/24 1047]   BP Pulse Resp Temp SpO2   (!) 147/92 90 (!) 22 97.9 °F (36.6 °C) (!) 91 %      MAP       --         Physical Exam    Nursing note and vitals reviewed.  Constitutional: He appears well-developed and well-nourished. He is not diaphoretic. No distress.   HENT:   Head: Normocephalic and atraumatic.   Eyes: Conjunctivae and EOM are normal. Pupils are equal, round, and reactive to light. No scleral icterus.   Neck: Neck supple.   Normal range of motion.  Cardiovascular:  Normal rate, regular rhythm, normal heart sounds and intact distal pulses.     Exam reveals no gallop and no friction rub.       No murmur heard.  Pulmonary/Chest: Breath sounds normal. No stridor. No respiratory distress. He has no wheezes. He has no rhonchi. He has no rales.   Abdominal: Abdomen is soft. Bowel sounds are normal. He exhibits no distension. There is no abdominal tenderness. There is no rebound and no guarding.   Musculoskeletal:         General: No tenderness or edema. Normal range of motion.      Cervical back: Normal range of motion and neck supple.     Neurological: He is alert and oriented to person, place, and time. He has normal strength. No cranial nerve deficit.   Skin: Skin is warm and dry. No rash noted.   Psychiatric: He has a normal mood and affect. His behavior is normal.          ED Course   Procedures  Labs Reviewed   CBC W/ AUTO DIFFERENTIAL - Abnormal; Notable for the following components:       Result Value    Hemoglobin 13.2 (*)     Hematocrit 38.2 (*)     All other components within normal limits   COMPREHENSIVE METABOLIC PANEL - Abnormal; Notable for the following components:    CO2 21 (*)     Glucose 179 (*)     Albumin 3.3 (*)     Alkaline Phosphatase 53 (*)     eGFR 59 (*)     All other components within normal limits   B-TYPE NATRIURETIC PEPTIDE - Abnormal; Notable for the following components:     (*)     All other components within normal limits   TROPONIN I   HEMOGLOBIN A1C   TROPONIN I   POCT GLUCOSE   POCT GLUCOSE MONITORING CONTINUOUS     EKG Readings: (Independently Interpreted)   Initial Reading: No STEMI. Rhythm: Normal Sinus Rhythm. Heart Rate: 94. Ectopy: No Ectopy.   No ST segment elevation or depression concerning for acute ischemia.          Imaging Results              X-Ray Chest AP Portable (Final result)  Result time 06/16/24 11:55:53      Final result by Yasmine Acosta MD (06/16/24 11:55:53)                   Impression:      Chronic lung findings, no definite acute intrathoracic process seen.      Electronically signed by: Yasmine Acosta MD  Date:    06/16/2024  Time:    11:55               Narrative:    EXAMINATION:  XR CHEST AP PORTABLE    CLINICAL HISTORY:  Chest Pain;    TECHNIQUE:  Single frontal view of the chest was performed.    COMPARISON:  05/01/2024    FINDINGS:  Low lung volume, poor inspiratory effort.    The cardiac silhouette is normal in size, midline.    There is increased interstitial lung markings, both lung fields, chronic.  No acute superimposed focal area of airspace consolidation.    No pleural effusion.  No pneumothorax.    The osseous structures appear normal.                                       Medications   glucose chewable tablet 16 g (has no administration in time range)   glucose chewable tablet 24 g  (has no administration in time range)   glucagon (human recombinant) injection 1 mg (has no administration in time range)   insulin aspart U-100 pen 0-5 Units (has no administration in time range)   melatonin tablet 6 mg (has no administration in time range)   senna-docusate 8.6-50 mg per tablet 1 tablet (has no administration in time range)   acetaminophen tablet 650 mg (has no administration in time range)   naloxone 0.4 mg/mL injection 0.02 mg (has no administration in time range)   magnesium oxide tablet 800 mg (has no administration in time range)   magnesium oxide tablet 800 mg (has no administration in time range)   sodium chloride 0.9% flush 10 mL (has no administration in time range)   amLODIPine tablet 5 mg (has no administration in time range)   aspirin chewable tablet 81 mg (has no administration in time range)   atorvastatin tablet 40 mg (has no administration in time range)   carvediloL tablet 25 mg (25 mg Oral Given 6/16/24 1742)   tamsulosin 24 hr capsule 0.4 mg (has no administration in time range)   hydrALAZINE tablet 25 mg (25 mg Oral Given 6/16/24 1619)   hydrALAZINE tablet 25 mg (25 mg Oral Given 6/16/24 1742)     Medical Decision Making  Amount and/or Complexity of Data Reviewed  Labs: ordered.  Radiology: ordered.    Risk  OTC drugs.  Prescription drug management.                          Medical Decision Making:   Initial Assessment:   73 year old patient with hx of MI and HTN presenting secondary to chest pain. Patient is at higher risk of ACS due to risk factors and HPI with a heart score of <=3. Patient has received an aspirin. Troponins, Cxr, ekg, and labs ordered which showed no acute findings.    [email or web address]    Also considered but less likely:     PE: normal rate, no sob/recent immovilization/surgery/travel/family history  Pneumonia: chest xray negative. No fever or leukoscytosis. No cough and lungs non consistent with pna  Tamponade: unlikely  due to chest xray and ekg  STEMI: No STEMI on ekg  Dissection: equal pulses bilaterally and no ripping chest pain to the back  Esophageal rupture: no dysphagia or vomiting and chest xray negative for mediastinal air  Arrhythmia: no arrhythmia on ekg  CHF: no fluid overload on Cxr and physical exam  Pneumothorax: bilateral breath sounds and no signs of pneumothorax on chest xray           ED Management:  72 yo male is presenting with an episode of acute shortness of breath, nausea, chest discomfort that started earlier this morning at about 8:00 a.m..  Patient reports the last time he had this was when he had a heart attack requiring a stent.  He notes he has baseline shortness of breath and hypoxia using oxygen as needed.  Currently he has been high 80s on room air.  Reports symptoms have since improved.  He noted episodes of nausea during this episode.  Troponin negative.  EKG nonischemic.  Patient feels improved on nasal cannula.  I would like to bring him in for ACS rule out.  Heart score greater than or equal to 4             Clinical Impression:  Final diagnoses:  [R07.9] Chest pain  [R06.02] SOB (shortness of breath) (Primary)          ED Disposition Condition    Observation Stable                Edgardo Cooley MD  06/16/24 0578

## 2024-06-16 NOTE — ASSESSMENT & PLAN NOTE
Developed CP/SOB after going to restaurant this morning without home oxygen. EKG without ST elevation, troponin negative, chest x-ray with chronic lung findings.   Continue home aspirin/statin  Troponin trend  Telemetry  Echo  Cardilogy consulted

## 2024-06-16 NOTE — ASSESSMENT & PLAN NOTE
Chronic, controlled. Latest blood pressure and vitals reviewed-     Temp:  [97.9 °F (36.6 °C)]   Pulse:  [75-90]   Resp:  [18-22]   BP: (147-179)/()   SpO2:  [91 %-96 %] .   Home meds for hypertension were reviewed and noted below.   Hypertension Medications               amLODIPine (NORVASC) 5 MG tablet Take 5 mg by mouth once daily.    carvediloL (COREG) 25 MG tablet Take 1 tablet (25 mg total) by mouth 2 (two) times daily with meals.            While in the hospital, will manage blood pressure as follows; Continue home antihypertensive regimen    Will utilize p.r.n. blood pressure medication only if patient's blood pressure greater than 180/110 and he develops symptoms such as worsening chest pain or shortness of breath.

## 2024-06-17 VITALS
DIASTOLIC BLOOD PRESSURE: 86 MMHG | BODY MASS INDEX: 21.37 KG/M2 | HEIGHT: 68 IN | RESPIRATION RATE: 14 BRPM | OXYGEN SATURATION: 93 % | WEIGHT: 141 LBS | SYSTOLIC BLOOD PRESSURE: 142 MMHG | TEMPERATURE: 98 F | HEART RATE: 83 BPM

## 2024-06-17 PROBLEM — I25.119 CORONARY ARTERY DISEASE INVOLVING NATIVE CORONARY ARTERY OF NATIVE HEART WITH ANGINA PECTORIS: Status: ACTIVE | Noted: 2024-02-28

## 2024-06-17 LAB
ALBUMIN SERPL BCP-MCNC: 3.4 G/DL (ref 3.5–5.2)
ALP SERPL-CCNC: 60 U/L (ref 55–135)
ALT SERPL W/O P-5'-P-CCNC: 20 U/L (ref 10–44)
ANION GAP SERPL CALC-SCNC: 10 MMOL/L (ref 8–16)
ASCENDING AORTA: 3.38 CM
AST SERPL-CCNC: 19 U/L (ref 10–40)
AV INDEX (PROSTH): 0.77
AV MEAN GRADIENT: 3 MMHG
AV PEAK GRADIENT: 4 MMHG
AV VALVE AREA BY VELOCITY RATIO: 2.84 CM²
AV VALVE AREA: 2.7 CM²
AV VELOCITY RATIO: 0.81
BASOPHILS # BLD AUTO: 0.01 K/UL (ref 0–0.2)
BASOPHILS NFR BLD: 0.1 % (ref 0–1.9)
BILIRUB SERPL-MCNC: 0.6 MG/DL (ref 0.1–1)
BSA FOR ECHO PROCEDURE: 1.75 M2
BUN SERPL-MCNC: 11 MG/DL (ref 8–23)
CALCIUM SERPL-MCNC: 9.6 MG/DL (ref 8.7–10.5)
CHLORIDE SERPL-SCNC: 103 MMOL/L (ref 95–110)
CO2 SERPL-SCNC: 25 MMOL/L (ref 23–29)
CREAT SERPL-MCNC: 1.1 MG/DL (ref 0.5–1.4)
CV ECHO LV RWT: 0.49 CM
CV STRESS BASE HR: 76 BPM
DIASTOLIC BLOOD PRESSURE: 102 MMHG
DIFFERENTIAL METHOD BLD: ABNORMAL
DOP CALC AO PEAK VEL: 1.02 M/S
DOP CALC AO VTI: 23.6 CM
DOP CALC LVOT AREA: 3.5 CM2
DOP CALC LVOT DIAMETER: 2.11 CM
DOP CALC LVOT PEAK VEL: 0.83 M/S
DOP CALC LVOT STROKE VOLUME: 63.61 CM3
DOP CALCLVOT PEAK VEL VTI: 18.2 CM
E/E' RATIO: 8.18 M/S
ECHO LV POSTERIOR WALL: 0.98 CM (ref 0.6–1.1)
EOSINOPHIL # BLD AUTO: 0.4 K/UL (ref 0–0.5)
EOSINOPHIL NFR BLD: 4.7 % (ref 0–8)
ERYTHROCYTE [DISTWIDTH] IN BLOOD BY AUTOMATED COUNT: 12.9 % (ref 11.5–14.5)
EST. GFR  (NO RACE VARIABLE): >60 ML/MIN/1.73 M^2
ESTIMATED AVG GLUCOSE: 166 MG/DL (ref 68–131)
FRACTIONAL SHORTENING: 27 % (ref 28–44)
GLUCOSE SERPL-MCNC: 151 MG/DL (ref 70–110)
HBA1C MFR BLD: 7.4 % (ref 4–5.6)
HCT VFR BLD AUTO: 42.1 % (ref 40–54)
HGB BLD-MCNC: 13.9 G/DL (ref 14–18)
IMM GRANULOCYTES # BLD AUTO: 0.02 K/UL (ref 0–0.04)
IMM GRANULOCYTES NFR BLD AUTO: 0.2 % (ref 0–0.5)
INTERVENTRICULAR SEPTUM: 1.02 CM (ref 0.6–1.1)
IVC DIAMETER: 1.5 CM
IVRT: 131.3 MSEC
LA MAJOR: 5.1 CM
LA MINOR: 5.04 CM
LA WIDTH: 4 CM
LEFT ATRIUM SIZE: 3.06 CM
LEFT ATRIUM VOLUME INDEX: 30 ML/M2
LEFT ATRIUM VOLUME: 52.75 CM3
LEFT INTERNAL DIMENSION IN SYSTOLE: 2.91 CM (ref 2.1–4)
LEFT VENTRICLE DIASTOLIC VOLUME INDEX: 39.94 ML/M2
LEFT VENTRICLE DIASTOLIC VOLUME: 70.29 ML
LEFT VENTRICLE MASS INDEX: 72 G/M2
LEFT VENTRICLE SYSTOLIC VOLUME INDEX: 18.4 ML/M2
LEFT VENTRICLE SYSTOLIC VOLUME: 32.37 ML
LEFT VENTRICULAR INTERNAL DIMENSION IN DIASTOLE: 4.01 CM (ref 3.5–6)
LEFT VENTRICULAR MASS: 127.56 G
LV LATERAL E/E' RATIO: 6.43 M/S
LV SEPTAL E/E' RATIO: 11.25 M/S
LVOT MG: 1.64 MMHG
LVOT MV: 0.6 CM/S
LYMPHOCYTES # BLD AUTO: 1.9 K/UL (ref 1–4.8)
LYMPHOCYTES NFR BLD: 20.9 % (ref 18–48)
MCH RBC QN AUTO: 28.3 PG (ref 27–31)
MCHC RBC AUTO-ENTMCNC: 33 G/DL (ref 32–36)
MCV RBC AUTO: 86 FL (ref 82–98)
MONOCYTES # BLD AUTO: 0.7 K/UL (ref 0.3–1)
MONOCYTES NFR BLD: 7.8 % (ref 4–15)
MV PEAK E VEL: 0.45 M/S
NEUTROPHILS # BLD AUTO: 5.9 K/UL (ref 1.8–7.7)
NEUTROPHILS NFR BLD: 66.3 % (ref 38–73)
NRBC BLD-RTO: 0 /100 WBC
NUC STRESS DIASTOLIC VOLUME INDEX: 75
NUC STRESS EJECTION FRACTION: 40 %
NUC STRESS SYSTOLIC VOLUME INDEX: 45
OHS CV CPX 85 PERCENT MAX PREDICTED HEART RATE MALE: 127
OHS CV CPX MAX PREDICTED HEART RATE: 149
OHS CV CPX PATIENT IS FEMALE: 0
OHS CV CPX PATIENT IS MALE: 1
OHS CV CPX PEAK DIASTOLIC BLOOD PRESSURE: 83 MMHG
OHS CV CPX PEAK HEAR RATE: 104 BPM
OHS CV CPX PEAK RATE PRESSURE PRODUCT: NORMAL
OHS CV CPX PEAK SYSTOLIC BLOOD PRESSURE: 118 MMHG
OHS CV CPX PERCENT MAX PREDICTED HEART RATE ACHIEVED: 70
OHS CV CPX RATE PRESSURE PRODUCT PRESENTING: NORMAL
OHS CV RV/LV RATIO: 0.89 CM
PISA TR MAX VEL: 0.83 M/S
PLATELET # BLD AUTO: 221 K/UL (ref 150–450)
PMV BLD AUTO: 10.2 FL (ref 9.2–12.9)
POCT GLUCOSE: 265 MG/DL (ref 70–110)
POTASSIUM SERPL-SCNC: 3.9 MMOL/L (ref 3.5–5.1)
PROT SERPL-MCNC: 7.2 G/DL (ref 6–8.4)
PV PEAK GRADIENT: 1 MMHG
PV PEAK VELOCITY: 0.6 M/S
RA MAJOR: 4.55 CM
RA PRESSURE ESTIMATED: 8 MMHG
RA WIDTH: 3.4 CM
RBC # BLD AUTO: 4.92 M/UL (ref 4.6–6.2)
RIGHT VENTRICULAR END-DIASTOLIC DIMENSION: 3.57 CM
RV TB RVSP: 9 MMHG
RV TISSUE DOPPLER FREE WALL SYSTOLIC VELOCITY 1 (APICAL 4 CHAMBER VIEW): 9.56 CM/S
SINUS: 3.8 CM
SODIUM SERPL-SCNC: 138 MMOL/L (ref 136–145)
STJ: 3.19 CM
SYSTOLIC BLOOD PRESSURE: 156 MMHG
TDI LATERAL: 0.07 M/S
TDI SEPTAL: 0.04 M/S
TDI: 0.06 M/S
TR MAX PG: 3 MMHG
TRICUSPID ANNULAR PLANE SYSTOLIC EXCURSION: 1.61 CM
TROPONIN I SERPL DL<=0.01 NG/ML-MCNC: 0.02 NG/ML (ref 0–0.03)
TV REST PULMONARY ARTERY PRESSURE: 11 MMHG
WBC # BLD AUTO: 8.94 K/UL (ref 3.9–12.7)
Z-SCORE OF LEFT VENTRICULAR DIMENSION IN END DIASTOLE: -1.94
Z-SCORE OF LEFT VENTRICULAR DIMENSION IN END SYSTOLE: -0.27

## 2024-06-17 PROCEDURE — 85025 COMPLETE CBC W/AUTO DIFF WBC: CPT | Performed by: PHYSICIAN ASSISTANT

## 2024-06-17 PROCEDURE — 25000003 PHARM REV CODE 250

## 2024-06-17 PROCEDURE — 84484 ASSAY OF TROPONIN QUANT: CPT | Performed by: PHYSICIAN ASSISTANT

## 2024-06-17 PROCEDURE — A9502 TC99M TETROFOSMIN: HCPCS | Performed by: HOSPITALIST

## 2024-06-17 PROCEDURE — 80053 COMPREHEN METABOLIC PANEL: CPT | Performed by: PHYSICIAN ASSISTANT

## 2024-06-17 PROCEDURE — G0378 HOSPITAL OBSERVATION PER HR: HCPCS

## 2024-06-17 PROCEDURE — 36415 COLL VENOUS BLD VENIPUNCTURE: CPT | Performed by: PHYSICIAN ASSISTANT

## 2024-06-17 PROCEDURE — 25000003 PHARM REV CODE 250: Performed by: PHYSICIAN ASSISTANT

## 2024-06-17 PROCEDURE — 63600175 PHARM REV CODE 636 W HCPCS: Performed by: INTERNAL MEDICINE

## 2024-06-17 RX ORDER — REGADENOSON 0.08 MG/ML
0.4 INJECTION, SOLUTION INTRAVENOUS ONCE
Status: DISCONTINUED | OUTPATIENT
Start: 2024-06-17 | End: 2024-06-17

## 2024-06-17 RX ORDER — AMLODIPINE BESYLATE 10 MG/1
10 TABLET ORAL DAILY
Qty: 90 TABLET | Refills: 3 | Status: SHIPPED | OUTPATIENT
Start: 2024-06-18 | End: 2025-06-18

## 2024-06-17 RX ORDER — HYDRALAZINE HYDROCHLORIDE 20 MG/ML
10 INJECTION INTRAMUSCULAR; INTRAVENOUS EVERY 6 HOURS PRN
Status: DISCONTINUED | OUTPATIENT
Start: 2024-06-17 | End: 2024-06-17 | Stop reason: HOSPADM

## 2024-06-17 RX ORDER — REGADENOSON 0.08 MG/ML
0.4 INJECTION, SOLUTION INTRAVENOUS ONCE
Status: COMPLETED | OUTPATIENT
Start: 2024-06-17 | End: 2024-06-17

## 2024-06-17 RX ORDER — AMLODIPINE BESYLATE 5 MG/1
10 TABLET ORAL DAILY
Status: DISCONTINUED | OUTPATIENT
Start: 2024-06-18 | End: 2024-06-17 | Stop reason: HOSPADM

## 2024-06-17 RX ADMIN — CARVEDILOL 25 MG: 12.5 TABLET, FILM COATED ORAL at 08:06

## 2024-06-17 RX ADMIN — REGADENOSON 0.4 MG: 0.08 INJECTION, SOLUTION INTRAVENOUS at 10:06

## 2024-06-17 RX ADMIN — ASPIRIN 81 MG CHEWABLE TABLET 81 MG: 81 TABLET CHEWABLE at 08:06

## 2024-06-17 RX ADMIN — TAMSULOSIN HYDROCHLORIDE 0.4 MG: 0.4 CAPSULE ORAL at 08:06

## 2024-06-17 RX ADMIN — TETROFOSMIN 30.4 MILLICURIE: 1.38 INJECTION, POWDER, LYOPHILIZED, FOR SOLUTION INTRAVENOUS at 10:06

## 2024-06-17 RX ADMIN — TETROFOSMIN 10.6 MILLICURIE: 1.38 INJECTION, POWDER, LYOPHILIZED, FOR SOLUTION INTRAVENOUS at 08:06

## 2024-06-17 RX ADMIN — HYDRALAZINE HYDROCHLORIDE 25 MG: 25 TABLET ORAL at 08:06

## 2024-06-17 RX ADMIN — AMLODIPINE BESYLATE 5 MG: 5 TABLET ORAL at 08:06

## 2024-06-17 NOTE — HOSPITAL COURSE
73 year old admitted for chest pain    Cards consulted. Atypical symptoms in setting of ILD and hypertension.  Troponin EKG negative.  Continue medical therapy. Consider addition of ARB. Increased dose of Norvasc.   Echo:    Left Ventricle: The left ventricle is normal in size. Normal wall thickness. Mild global hypokinesis present, cannot exclude anterolateral WMA. There is mildly reduced systolic function with a visually estimated ejection fraction of 40 - 45%. Grade I diastolic dysfunction.    Right Ventricle: Normal right ventricular cavity size. Systolic function is normal.    Aorta: Aortic root is mildly dilated measuring 3.80 cm.    Nuclear stress test:     Abnormal myocardial perfusion scan.    There is a severe intensity, medium sized, mostly fixed perfusion abnormality with minimal reversibilty in the mid to apical anterior and apical wall(s) in the typical distribution of the LAD territory.  This is conssistent with scar and minimal periinfarct ischemia.    There are no other significant perfusion abnormalities.    The gated perfusion images showed an ejection fraction of 40% post stress.    There is anteroapical wall akinesis during stress.    Dr. Ventura cleared patient for discharge. F/u with  Dr. Ventura or primary cardiologist.

## 2024-06-17 NOTE — NURSING
Patient attempted Exercise stress test unable to complete due to SOB, peak  /80 walked 3 m inutes 47 seconds, changed to Lexiscan stress test per protocol

## 2024-06-17 NOTE — HPI
73 y.o. male with CAD, HTN, DM, HLD, ILD on home oxygen presents hospital chief complaint shortness of breath.  Reports he developed shortness of breath after ambulating to restaurant this morning to eat pancakes.  He also developed sharp left-sided chest pain without radiation.  The symptoms improved with supplemental oxygen in the emergency room.  He reports he did not wear oxygen when he went to the restaurant for breakfast as he was unaware he was to wear continuously.  He denies fever nausea vomiting abdominal pain leg swelling melena hematuria hematemesis and syncope.  In the ED, afebrile without leukocytosis troponin negative EKG without ST elevation chest x-ray with chronic lung findings.    Last seen by Dr. Donis 3/2022.  Follows with Dr. Schmitt from pul.    Cardiology consulted for CP.    The patient is a pleasant 73-year-old man who has a history of coronary artery disease status post remote PCI by Dr. Ibrahim.  He also has hypertension dyslipidemia and diabetes as well as interstitial lung disease on home oxygen presenting with shortness of breath.  He apparently was not wearing his oxygen.  He also describes some atypical/stabbing chest discomfort in association with the shortness of breath.  Blood pressure is uncontrolled.  His EKGs nonischemic and troponins are negative.  I discussed the pros and cons of stress testing versus diagnostic angiography and we have decided to move forward with a stress test 1st.

## 2024-06-17 NOTE — ASSESSMENT & PLAN NOTE
Atypical symptoms in setting of ILD and hypertension.    Troponin EKG negative.    Echocardiogram ordered.    Background history of CAD status post PCI.  Last stress test was in March 20, 2022 noting fixed defects.    Pros and cons of coronary angiography versus repeat stress testing discussed with the patient we decided to proceed with exercise nuclear stress testing.    Continue medical therapy to include aspirin and statin.

## 2024-06-17 NOTE — DISCHARGE SUMMARY
[facility] Medicine  Discharge Summary      Patient Name: Emmanuel Grant  MRN: [id number]  ALVARO: [id number]  Patient Class: OP- Observation  Admission Date: 6/16/2024  Hospital Length of Stay: 0 days  Discharge Date and Time:  06/17/2024 12:25 PM  Attending Physician: Nargis Parr MD   Discharging Provider: Ranjith Patino NP  Primary Care Provider: Payam Vu Jr., NP    Primary Care Team: Networked reference to record PCT     HPI:   Emmanuel Grant 73 y.o. male with CAD, HTN, DM, HLD, ILD on home oxygen presents hospital chief complaint shortness of breath.  Reports he developed shortness of breath after ambulating to restaurant this morning to eat pancakes.  He also developed sharp left-sided chest pain without radiation.  The symptoms improved with supplemental oxygen in the emergency room.  He reports he did not wear oxygen when he went to the restaurant for breakfast as he was unaware he was to wear continuously.  He denies fever nausea vomiting abdominal pain leg swelling melena hematuria hematemesis and syncope.    In the ED, afebrile without leukocytosis troponin negative EKG without ST elevation chest x-ray with chronic lung findings.    * No surgery found *      Hospital Course:   73 year old admitted for chest pain    Cards consulted. Atypical symptoms in setting of ILD and hypertension.  Troponin EKG negative.  Continue medical therapy. Consider addition of ARB. Increased dose of Norvasc.   Echo:    Left Ventricle: The left ventricle is normal in size. Normal wall thickness. Mild global hypokinesis present, cannot exclude anterolateral WMA. There is mildly reduced systolic function with a visually estimated ejection fraction of 40 - 45%. Grade I diastolic dysfunction.    Right Ventricle: Normal right ventricular cavity size. Systolic function is normal.    Aorta: Aortic root is mildly dilated measuring 3.80 cm.    Nuclear stress test:     Abnormal myocardial perfusion  scan.    There is a severe intensity, medium sized, mostly fixed perfusion abnormality with minimal reversibilty in the mid to apical anterior and apical wall(s) in the typical distribution of the LAD territory.  This is conssistent with scar and minimal periinfarct ischemia.    There are no other significant perfusion abnormalities.    The gated perfusion images showed an ejection fraction of 40% post stress.    There is anteroapical wall akinesis during stress.    Dr. Ventura cleared patient for discharge. F/u with  Dr. Ventura or primary cardiologist.        Goals of Care Treatment Preferences:  Code Status: Full Code      Consults:   Consults (From admission, onward)          Status Ordering Provider     Inpatient consult to Social Work  Once        Provider:  (Not yet assigned)    Completed SIDDHARTHA VENTURA     Case Management/  Once        Provider:  (Not yet assigned)    Completed TY GARCIA     Inpatient consult to Cardiology  Once        Provider:  Siddhartha Ventura MD Completed SHOWERS, DANIEL            No new Assessment & Plan notes have been filed under this hospital service since the last note was generated.  Service: Hospital Medicine    Final Active Diagnoses:    Diagnosis Date Noted POA    PRINCIPAL PROBLEM:  Chest pain [R07.9] 06/16/2024 Yes    Chronic HFrEF (heart failure with reduced ejection fraction) [I50.22] 05/01/2024 Yes    Interstitial lung disease [J84.9] 02/28/2024 Yes    Coronary artery disease involving native coronary artery of native heart with angina pectoris [I25.119] 02/28/2024 Yes    BPH (benign prostatic hyperplasia) [N40.0] 02/28/2024 Yes    Type 2 diabetes mellitus without complication, without long-term current use of insulin [E11.9] 10/08/2021 Yes    Hyperlipidemia [E78.5] 05/29/2020 Yes    Essential hypertension [I10] 05/29/2020 Yes      Problems Resolved During this Admission:       Discharged Condition: stable    Disposition: Home or Self  Care    Follow Up:   Follow-up Information       Payam Vu Jr., NP. Schedule an appointment as soon as possible for a visit in 1 week(s).    Specialty: Internal Medicine  Why: If symptoms worsen, As needed  Contact information:  [address]  [facility]  [place] [address]  [phone]               Brandon Ventura MD. Schedule an appointment as soon as possible for a visit in 2 day(s).    Specialties: Cardiology, Interventional Cardiology  Why: If symptoms worsen, As needed  Contact information:  [address]  [address]  [place] [address]  [phone]                           Patient Instructions:   No discharge procedures on file.    Significant Diagnostic Studies: Labs: BMP:   Recent Labs   Lab 06/16/24  1212 06/17/24  0420   * 151*    138   K 3.9 3.9    103   CO2 21* 25   BUN 13 11   CREATININE 1.3 1.1   CALCIUM 9.8 9.6   , CMP   Recent Labs   Lab 06/16/24  1212 06/17/24  0420    138   K 3.9 3.9    103   CO2 21* 25   * 151*   BUN 13 11   CREATININE 1.3 1.1   CALCIUM 9.8 9.6   PROT 6.9 7.2   ALBUMIN 3.3* 3.4*   BILITOT 0.4 0.6   ALKPHOS 53* 60   AST 20 19   ALT 20 20   ANIONGAP 13 10   , CBC   Recent Labs   Lab 06/16/24  1212 06/17/24  0420   WBC 8.26 8.94   HGB 13.2* 13.9*   HCT 38.2* 42.1    221   , INR   Lab Results   Component Value Date    INR 1.2 07/07/2023    INR 1.1 08/17/2019   , Lipid Panel   Lab Results   Component Value Date    CHOL 109 (L) 05/07/2024    HDL 39 (L) 05/07/2024    LDLCALC 58.0 (L) 05/07/2024    TRIG 60 05/07/2024    CHOLHDL 35.8 05/07/2024   , Troponin   Recent Labs   Lab 06/16/24  1212 06/16/24  1744 06/17/24  0021   TROPONINI 0.010 0.017 0.022   , A1C:   Recent Labs   Lab 02/18/24  1838   HGBA1C 7.0*   , and All labs within the past 24 hours have been reviewed    Pending Diagnostic Studies:       Procedure Component Value Units Date/Time    Hemoglobin A1c if not done in past 3 months [[id number]] Collected:  06/16/24 1452    Order Status: Sent Lab Status: No result     Specimen: Blood            Medications:  Reconciled Home Medications:      Medication List        CHANGE how you take these medications      amLODIPine 10 MG tablet  Commonly known as: NORVASC  Take 1 tablet (10 mg total) by mouth once daily.  Start taking on: June 18, 2024  What changed:   medication strength  how much to take     metFORMIN 1000 MG tablet  Commonly known as: GLUCOPHAGE  Take 0.5 tablets (500 mg total) by mouth daily with breakfast.  What changed:   how much to take  when to take this            CONTINUE taking these medications      albuterol-ipratropium 2.5 mg-0.5 mg/3 mL nebulizer solution  Commonly known as: DUO-NEB  Take 3 mLs by nebulization every 6 (six) hours as needed for Wheezing. Rescue     aspirin 81 MG Chew  Take 81 mg by mouth once daily.     atorvastatin 40 MG tablet  Commonly known as: LIPITOR  Take 1 tablet (40 mg total) by mouth every evening.     BEANO ORAL  Take 1 tablet by mouth 2 (two) times a day.     blood sugar diagnostic Strp  To check BG 1 times daily, to use with insurance preferred meter     blood-glucose meter kit  To check BG 1 times daily, to use with insurance preferred meter     carvediloL 25 MG tablet  Commonly known as: COREG  Take 1 tablet (25 mg total) by mouth 2 (two) times daily with meals.     cholecalciferol (vitamin D3) 50 mcg (2,000 unit) Cap capsule  Commonly known as: VITAMIN D3  1 capsule.     DRY EYE RELIEF OPHT  Apply to eye.     famotidine 20 MG tablet  Commonly known as: PEPCID  TAKE 1 TABLET(20 MG) BY MOUTH TWICE DAILY     * fluticasone propionate 50 mcg/actuation nasal spray  Commonly known as: FLONASE  1 spray in each nostril Nasally Once a day     * fluticasone propionate 50 mcg/actuation nasal spray  Commonly known as: FLONASE  1 spray (50 mcg total) by Each Nostril route once daily.     lancets Misc  To check BG 1 times daily, to use with insurance preferred meter     latanoprost  0.005 % ophthalmic solution  Place 1 drop into both eyes every evening.     magnesium citrate solution  Take half the bottle for constipation as needed.     montelukast 10 mg tablet  Commonly known as: SINGULAIR  Take 10 mg by mouth every evening.     polyethylene glycol 17 gram Pwpk  Commonly known as: GLYCOLAX  Take by mouth.     SENNA WITH DOCUSATE SODIUM 8.6-50 mg per tablet  Generic drug: senna-docusate 8.6-50 mg  Take 1 tablet by mouth once daily.     sucralfate 1 gram tablet  Commonly known as: CARAFATE  Take 1 g by mouth 4 (four) times daily.     tamsulosin 0.4 mg Cap  Commonly known as: FLOMAX  Take 1 capsule (0.4 mg total) by mouth once daily.           * This list has 2 medication(s) that are the same as other medications prescribed for you. Read the directions carefully, and ask your doctor or other care provider to review them with you.                  Indwelling Lines/Drains at time of discharge:   Lines/Drains/Airways       None                   Time spent on the discharge of patient: 30 minutes         Ranjith Patino NP  Department of Hospital Medicine  [facility] [place] [facility] Surg

## 2024-06-17 NOTE — SUBJECTIVE & OBJECTIVE
"Past Medical History:   Diagnosis Date    CAD (coronary artery disease)     Sees [facility], h/o stent    Diabetes mellitus     Hypertension     Kidney stone     Stroke     age 62       Past Surgical History:   Procedure Laterality Date    CARDIAC CATHETERIZATION      with stent    COLONOSCOPY N/A 03/27/2024    Procedure: COLONOSCOPY;  Surgeon: Ariela Castro MD;  Location: [facility] ENDO;  Service: Endoscopy;  Laterality: N/A;    ESOPHAGOGASTRODUODENOSCOPY N/A 03/27/2024    Procedure: EGD (ESOPHAGOGASTRODUODENOSCOPY);  Surgeon: Ariela Castro MD;  Location: [facility] ENDO;  Service: Endoscopy;  Laterality: N/A;    SHOULDER SURGERY Right        Review of patient's allergies indicates:   Allergen Reactions    Dapagliflozin      Other reaction(s): Other (See Comments)    Pcn [penicillins]     Linagliptin Other (See Comments)     "it knocked me down", "it almost killed me"    Lisinopril Other (See Comments)     cough    Pantoprazole Hives       No current facility-administered medications on file prior to encounter.     Current Outpatient Medications on File Prior to Encounter   Medication Sig    albuterol-ipratropium (DUO-NEB) 2.5 mg-0.5 mg/3 mL nebulizer solution Take 3 mLs by nebulization every 6 (six) hours as needed for Wheezing. Rescue    alpha-D-galactosidase (BEANO ORAL) Take 1 tablet by mouth 2 (two) times a day.    amLODIPine (NORVASC) 5 MG tablet Take 5 mg by mouth once daily.    aspirin 81 MG Chew Take 81 mg by mouth once daily.    atorvastatin (LIPITOR) 40 MG tablet Take 1 tablet (40 mg total) by mouth every evening.    blood sugar diagnostic Strp To check BG 1 times daily, to use with insurance preferred meter    blood-glucose meter kit To check BG 1 times daily, to use with insurance preferred meter    carvediloL (COREG) 25 MG tablet Take 1 tablet (25 mg total) by mouth 2 (two) times daily with meals.    cholecalciferol, vitamin D3, (VITAMIN D3) 50 mcg (2,000 unit) Cap capsule 1 capsule.    famotidine " (PEPCID) 20 MG tablet TAKE 1 TABLET(20 MG) BY MOUTH TWICE DAILY    fluticasone propionate (FLONASE) 50 mcg/actuation nasal spray 1 spray in each nostril Nasally Once a day    fluticasone propionate (FLONASE) 50 mcg/actuation nasal spray 1 spray (50 mcg total) by Each Nostril route once daily.    lancets Misc To check BG 1 times daily, to use with insurance preferred meter    latanoprost 0.005 % ophthalmic solution Place 1 drop into both eyes every evening.    magnesium citrate solution Take half the bottle for constipation as needed.    metFORMIN (GLUCOPHAGE) 1000 MG tablet Take 0.5 tablets (500 mg total) by mouth daily with breakfast. (Patient taking differently: Take 1,000 mg by mouth 2 (two) times daily with meals.)    montelukast (SINGULAIR) 10 mg tablet Take 10 mg by mouth every evening.    peg 400/hypromellose/glycerin (DRY EYE RELIEF OPHT) Apply to eye.    polyethylene glycol (GLYCOLAX) 17 gram PwPk Take by mouth.    senna-docusate 8.6-50 mg (SENNA WITH DOCUSATE SODIUM) 8.6-50 mg per tablet Take 1 tablet by mouth once daily.    sucralfate (CARAFATE) 1 gram tablet Take 1 g by mouth 4 (four) times daily.    tamsulosin (FLOMAX) 0.4 mg Cap Take 1 capsule (0.4 mg total) by mouth once daily.     Family History       Problem Relation (Age of Onset)    Cancer Mother    Colon cancer Sister          Tobacco Use    Smoking status: Never     Passive exposure: Never    Smokeless tobacco: Never   Substance and Sexual Activity    Alcohol use: No    Drug use: Never    Sexual activity: Not Currently     Review of Systems   Constitutional: Negative for chills, diaphoresis, fever and malaise/fatigue.   HENT:  Negative for nosebleeds.    Eyes:  Negative for blurred vision and double vision.   Cardiovascular:  Positive for chest pain (atyp). Negative for claudication, cyanosis, dyspnea on exertion, leg swelling, orthopnea, palpitations, paroxysmal nocturnal dyspnea and syncope.   Respiratory:  Positive for shortness of breath.  Negative for cough and wheezing.    Skin:  Negative for dry skin and poor wound healing.   Musculoskeletal:  Negative for back pain, joint swelling and myalgias.   Gastrointestinal:  Negative for abdominal pain, nausea and vomiting.   Genitourinary:  Negative for hematuria.   Neurological:  Negative for dizziness, headaches, numbness, seizures and weakness.   Psychiatric/Behavioral:  Negative for altered mental status and depression.      Objective:     Vital Signs (Most Recent):  Temp: 98.1 °F (36.7 °C) (06/17/24 0631)  Pulse: 72 (06/17/24 0631)  Resp: 16 (06/17/24 0631)  BP: (!) 180/98 (06/17/24 0631)  SpO2: 97 % (06/17/24 0631) Vital Signs (24h Range):  Temp:  [97.6 °F (36.4 °C)-99 °F (37.2 °C)] 98.1 °F (36.7 °C)  Pulse:  [72-90] 72  Resp:  [16-23] 16  SpO2:  [91 %-97 %] 97 %  BP: (147-212)/() 180/98     Weight: 64 kg (141 lb)  Body mass index is 21.44 kg/m².    SpO2: 97 %         Intake/Output Summary (Last 24 hours) at 6/17/2024 0730  Last data filed at 6/16/2024 2000  Gross per 24 hour   Intake 240 ml   Output --   Net 240 ml       Lines/Drains/Airways       Peripheral Intravenous Line  Duration                  Peripheral IV - Single Lumen 06/16/24 1442 18 G Anterior;Right Forearm <1 day                     Physical Exam  Constitutional:       General: He is not in acute distress.     Appearance: Normal appearance. He is well-developed and normal weight. He is not ill-appearing, toxic-appearing or diaphoretic.   HENT:      Head: Normocephalic and atraumatic.   Eyes:      General: No scleral icterus.     Extraocular Movements: Extraocular movements intact.      Conjunctiva/sclera: Conjunctivae normal.      Pupils: Pupils are equal, round, and reactive to light.   Neck:      Thyroid: No thyromegaly.      Vascular: No carotid bruit or JVD.      Trachea: No tracheal deviation.   Cardiovascular:      Rate and Rhythm: Normal rate and regular rhythm.      Pulses:           Carotid pulses are 2+ on the right  side and 2+ on the left side.     Heart sounds: S1 normal and S2 normal. No murmur heard.     No friction rub. No gallop.   Pulmonary:      Effort: Pulmonary effort is normal. No respiratory distress.      Breath sounds: Normal breath sounds. No stridor. No wheezing, rhonchi or rales.   Chest:      Chest wall: No tenderness.   Abdominal:      General: There is no distension.      Palpations: Abdomen is soft.   Musculoskeletal:         General: No swelling or tenderness. Normal range of motion.      Cervical back: Normal range of motion and neck supple. No rigidity.      Right lower leg: No edema.      Left lower leg: No edema.   Skin:     General: Skin is warm and dry.      Coloration: Skin is not jaundiced.      Findings: No rash.   Neurological:      General: No focal deficit present.      Mental Status: He is alert and oriented to person, place, and time.      Cranial Nerves: No cranial nerve deficit.   Psychiatric:         Mood and Affect: Mood normal.         Behavior: Behavior normal.          Current Medications:   amLODIPine  5 mg Oral Daily    aspirin  81 mg Oral Daily    atorvastatin  40 mg Oral QHS    carvediloL  25 mg Oral BID WM    tamsulosin  0.4 mg Oral Daily         Current Facility-Administered Medications:     acetaminophen, 650 mg, Oral, Q4H PRN    benzonatate, 100 mg, Oral, TID PRN    glucagon (human recombinant), 1 mg, Intramuscular, PRN    glucose, 16 g, Oral, PRN    glucose, 24 g, Oral, PRN    hydrALAZINE, 25 mg, Oral, Q8H PRN    insulin aspart U-100, 0-5 Units, Subcutaneous, QID (AC + HS) PRN    magnesium oxide, 800 mg, Oral, PRN    magnesium oxide, 800 mg, Oral, PRN    melatonin, 6 mg, Oral, Nightly PRN    naloxone, 0.02 mg, Intravenous, PRN    senna-docusate 8.6-50 mg, 1 tablet, Oral, Daily PRN    sodium chloride 0.9%, 10 mL, Intravenous, PRN    Laboratory (all labs reviewed):  CBC:  Recent Labs   Lab 04/20/24  1143 04/21/24  0429 05/01/24  2140 06/16/24  1212 06/17/24  0420   WBC 8.41  10.37 8.67 8.26 8.94   Hemoglobin 13.4 L 13.2 L 13.9 L 13.2 L 13.9 L   Hematocrit 38.9 L 40.0 40.1 38.2 L 42.1   Platelets 193 203 198 199 221       CHEMISTRIES:  Recent Labs   Lab 04/21/24 0429 05/01/24 2140 05/07/24 0752 06/16/24  1212 06/17/24  0420   Glucose 125 H 306 H 229 H 179 H 151 H   Sodium 140 139 138 138 138   Potassium 3.6 4.3 4.0 3.9 3.9   BUN 12 13 27 H 13 11   Creatinine 1.0 1.2 1.2 1.3 1.1   eGFR >60 >60 >60 59 A >60   Calcium 9.5 10.0 9.1 9.8 9.6   Magnesium  --  1.3 L  --   --   --        CARDIAC BIOMARKERS:  Recent Labs   Lab 07/20/23 1213 02/28/24 1625 02/28/24  1926 04/20/24  1215 06/16/24  1212 06/16/24  1744 06/17/24  0021     --   --   --   --   --   --    Troponin I  --    < > <0.006 0.012 0.010 0.017 0.022    < > = values in this interval not displayed.       COAGS:  Recent Labs   Lab 07/07/23  1212   INR 1.2       LIPIDS/LFTS:  Recent Labs   Lab 04/21/24 0429 05/01/24 2140 05/07/24 0752 06/16/24  1212 06/17/24  0420   Cholesterol  --   --  109 L  --   --    Triglycerides  --   --  60  --   --    HDL  --   --  39 L  --   --    LDL Cholesterol  --   --  58.0 L  --   --    Non-HDL Cholesterol  --   --  70  --   --    AST 22 22 18 20 19   ALT 21 17 34 20 20       BNP:  Recent Labs   Lab 02/28/24  1625 05/01/24 2140 06/16/24  1212   BNP 51 65 194 H       TSH:        Free T4:        Diagnostic Results:  ECG (personally reviewed and interpreted tracing(s)):  6/16/24 1042 SR 94, PAC, NSSTTW changes, ?LVH    Chest X-Ray (personally reviewed and interpreted image(s)): 6/16/24 ILD    Echo: 2/19/24 (repeat ordered)    Moderately decreased left ventricular systolic function.     Left ventricular ejection fraction is estimated at 40%.     Grade I diastolic dysfunction (abnormal relaxation filling pattern), normal to mildly elevated filling pressures.     RVSP could not be calculated due to incomplete tricuspid regurgitation velocity profile.     Structurally normal mitral valve.      Structurally normal trileaflet aortic valve.     Structurally normal tricuspid valve.     Mild aortic dilatation of the sinuses of valsalva 3.9cm.     Stress Test: (per Gagandeep note 3/2022)  March 21, 2022 Lexiscan MPI stress test  Gated Mibi EF 51% normal function  Large defect severe intensity mid anterior apically anterior apex April cool lateral fixed  No ischemic defects seen

## 2024-06-17 NOTE — NURSING
[facility] [place] [place] 4 [place]      6/17/2024       Skin assessed on: Q Shift      [x] No Pressure Injuries Present    []Prevention Measures Documented    [] Yes LDA  for Pressure Injury Previously documented     [] Yes New Pressure Injury Discovered   [] LDA for New Pressure Injury Added      Attending RN:  Arlette Gross, KANDI     Second RN:  TimRN

## 2024-06-17 NOTE — PLAN OF CARE
06/17/24 1030   Discharge Planning   Assessment Type Discharge Planning Brief Assessment   Resource/Environmental Concerns none   Support Systems Children;Family members   Equipment Currently Used at Home oxygen   Current Living Arrangements home   Patient/Family Anticipates Transition to home with family   Patient/Family Anticipated Services at Transition none   DME Needed Upon Discharge  none   Discharge Plan A Home with family  (with instructions to follow up)       [facility] [id number] [address] AT [address] [address]  Phone: [phone] Fax: [phone]

## 2024-06-17 NOTE — CONSULTS
[facility] Surg  Cardiology  Consult Note    Patient Name: Emmanuel Grant  MRN: [id number]  Admission Date: 6/16/2024  Hospital Length of Stay: 0 days  Code Status: Full Code   Attending Provider: Nargis Parr MD   Consulting Provider: Brandon Ventura MD  Primary Care Physician: Payam Vu Jr., NP  Principal Problem:Chest pain    Patient information was obtained from patient and ER records.     Inpatient consult to Cardiology  Consult performed by: Brandon Ventura MD  Consult ordered by: Ousmane Alcantar PA-C  Reason for consult: CP/CAD        Subjective:     Chief Complaint:  CP     HPI:   73 y.o. male with CAD, HTN, DM, HLD, ILD on home oxygen presents hospital chief complaint shortness of breath.  Reports he developed shortness of breath after ambulating to restaurant this morning to eat pancakes.  He also developed sharp left-sided chest pain without radiation.  The symptoms improved with supplemental oxygen in the emergency room.  He reports he did not wear oxygen when he went to the restaurant for breakfast as he was unaware he was to wear continuously.  He denies fever nausea vomiting abdominal pain leg swelling melena hematuria hematemesis and syncope.  In the ED, afebrile without leukocytosis troponin negative EKG without ST elevation chest x-ray with chronic lung findings.    Last seen by Dr. Donis 3/2022.  Follows with Dr. Schmitt from pul.    Cardiology consulted for CP.    The patient is a pleasant 73-year-old man who has a history of coronary artery disease status post remote PCI by Dr. Ibrahim.  He also has hypertension dyslipidemia and diabetes as well as interstitial lung disease on home oxygen presenting with shortness of breath.  He apparently was not wearing his oxygen.  He also describes some atypical/stabbing chest discomfort in association with the shortness of breath.  Blood pressure is uncontrolled.  His EKGs nonischemic and troponins are negative.  I discussed the pros  "and cons of stress testing versus diagnostic angiography and we have decided to move forward with a stress test 1st.        Past Medical History:   Diagnosis Date    CAD (coronary artery disease)     Sees [facility], h/o stent    Diabetes mellitus     Hypertension     Kidney stone     Stroke     age 62       Past Surgical History:   Procedure Laterality Date    CARDIAC CATHETERIZATION      with stent    COLONOSCOPY N/A 03/27/2024    Procedure: COLONOSCOPY;  Surgeon: Ariela Castro MD;  Location: [facility] ENDO;  Service: Endoscopy;  Laterality: N/A;    ESOPHAGOGASTRODUODENOSCOPY N/A 03/27/2024    Procedure: EGD (ESOPHAGOGASTRODUODENOSCOPY);  Surgeon: Ariela Castro MD;  Location: [facility] ENDO;  Service: Endoscopy;  Laterality: N/A;    SHOULDER SURGERY Right        Review of patient's allergies indicates:   Allergen Reactions    Dapagliflozin      Other reaction(s): Other (See Comments)    Pcn [penicillins]     Linagliptin Other (See Comments)     "it knocked me down", "it almost killed me"    Lisinopril Other (See Comments)     cough    Pantoprazole Hives       No current facility-administered medications on file prior to encounter.     Current Outpatient Medications on File Prior to Encounter   Medication Sig    albuterol-ipratropium (DUO-NEB) 2.5 mg-0.5 mg/3 mL nebulizer solution Take 3 mLs by nebulization every 6 (six) hours as needed for Wheezing. Rescue    alpha-D-galactosidase (BEANO ORAL) Take 1 tablet by mouth 2 (two) times a day.    amLODIPine (NORVASC) 5 MG tablet Take 5 mg by mouth once daily.    aspirin 81 MG Chew Take 81 mg by mouth once daily.    atorvastatin (LIPITOR) 40 MG tablet Take 1 tablet (40 mg total) by mouth every evening.    blood sugar diagnostic Strp To check BG 1 times daily, to use with insurance preferred meter    blood-glucose meter kit To check BG 1 times daily, to use with insurance preferred meter    carvediloL (COREG) 25 MG tablet Take 1 tablet (25 mg total) by mouth 2 (two) " times daily with meals.    cholecalciferol, vitamin D3, (VITAMIN D3) 50 mcg (2,000 unit) Cap capsule 1 capsule.    famotidine (PEPCID) 20 MG tablet TAKE 1 TABLET(20 MG) BY MOUTH TWICE DAILY    fluticasone propionate (FLONASE) 50 mcg/actuation nasal spray 1 spray in each nostril Nasally Once a day    fluticasone propionate (FLONASE) 50 mcg/actuation nasal spray 1 spray (50 mcg total) by Each Nostril route once daily.    lancets Misc To check BG 1 times daily, to use with insurance preferred meter    latanoprost 0.005 % ophthalmic solution Place 1 drop into both eyes every evening.    magnesium citrate solution Take half the bottle for constipation as needed.    metFORMIN (GLUCOPHAGE) 1000 MG tablet Take 0.5 tablets (500 mg total) by mouth daily with breakfast. (Patient taking differently: Take 1,000 mg by mouth 2 (two) times daily with meals.)    montelukast (SINGULAIR) 10 mg tablet Take 10 mg by mouth every evening.    peg 400/hypromellose/glycerin (DRY EYE RELIEF OPHT) Apply to eye.    polyethylene glycol (GLYCOLAX) 17 gram PwPk Take by mouth.    senna-docusate 8.6-50 mg (SENNA WITH DOCUSATE SODIUM) 8.6-50 mg per tablet Take 1 tablet by mouth once daily.    sucralfate (CARAFATE) 1 gram tablet Take 1 g by mouth 4 (four) times daily.    tamsulosin (FLOMAX) 0.4 mg Cap Take 1 capsule (0.4 mg total) by mouth once daily.     Family History       Problem Relation (Age of Onset)    Cancer Mother    Colon cancer Sister          Tobacco Use    Smoking status: Never     Passive exposure: Never    Smokeless tobacco: Never   Substance and Sexual Activity    Alcohol use: No    Drug use: Never    Sexual activity: Not Currently     Review of Systems   Constitutional: Negative for chills, diaphoresis, fever and malaise/fatigue.   HENT:  Negative for nosebleeds.    Eyes:  Negative for blurred vision and double vision.   Cardiovascular:  Positive for chest pain (atyp). Negative for claudication, cyanosis, dyspnea on exertion, leg  swelling, orthopnea, palpitations, paroxysmal nocturnal dyspnea and syncope.   Respiratory:  Positive for shortness of breath. Negative for cough and wheezing.    Skin:  Negative for dry skin and poor wound healing.   Musculoskeletal:  Negative for back pain, joint swelling and myalgias.   Gastrointestinal:  Negative for abdominal pain, nausea and vomiting.   Genitourinary:  Negative for hematuria.   Neurological:  Negative for dizziness, headaches, numbness, seizures and weakness.   Psychiatric/Behavioral:  Negative for altered mental status and depression.      Objective:     Vital Signs (Most Recent):  Temp: 98.1 °F (36.7 °C) (06/17/24 0631)  Pulse: 72 (06/17/24 0631)  Resp: 16 (06/17/24 0631)  BP: (!) 180/98 (06/17/24 0631)  SpO2: 97 % (06/17/24 0631) Vital Signs (24h Range):  Temp:  [97.6 °F (36.4 °C)-99 °F (37.2 °C)] 98.1 °F (36.7 °C)  Pulse:  [72-90] 72  Resp:  [16-23] 16  SpO2:  [91 %-97 %] 97 %  BP: (147-212)/() 180/98     Weight: 64 kg (141 lb)  Body mass index is 21.44 kg/m².    SpO2: 97 %         Intake/Output Summary (Last 24 hours) at 6/17/2024 0730  Last data filed at 6/16/2024 2000  Gross per 24 hour   Intake 240 ml   Output --   Net 240 ml       Lines/Drains/Airways       Peripheral Intravenous Line  Duration                  Peripheral IV - Single Lumen 06/16/24 1442 18 G Anterior;Right Forearm <1 day                     Physical Exam  Constitutional:       General: He is not in acute distress.     Appearance: Normal appearance. He is well-developed and normal weight. He is not ill-appearing, toxic-appearing or diaphoretic.   HENT:      Head: Normocephalic and atraumatic.   Eyes:      General: No scleral icterus.     Extraocular Movements: Extraocular movements intact.      Conjunctiva/sclera: Conjunctivae normal.      Pupils: Pupils are equal, round, and reactive to light.   Neck:      Thyroid: No thyromegaly.      Vascular: No carotid bruit or JVD.      Trachea: No tracheal deviation.    Cardiovascular:      Rate and Rhythm: Normal rate and regular rhythm.      Pulses:           Carotid pulses are 2+ on the right side and 2+ on the left side.     Heart sounds: S1 normal and S2 normal. No murmur heard.     No friction rub. No gallop.   Pulmonary:      Effort: Pulmonary effort is normal. No respiratory distress.      Breath sounds: Normal breath sounds. No stridor. No wheezing, rhonchi or rales.   Chest:      Chest wall: No tenderness.   Abdominal:      General: There is no distension.      Palpations: Abdomen is soft.   Musculoskeletal:         General: No swelling or tenderness. Normal range of motion.      Cervical back: Normal range of motion and neck supple. No rigidity.      Right lower leg: No edema.      Left lower leg: No edema.   Skin:     General: Skin is warm and dry.      Coloration: Skin is not jaundiced.      Findings: No rash.   Neurological:      General: No focal deficit present.      Mental Status: He is alert and oriented to person, place, and time.      Cranial Nerves: No cranial nerve deficit.   Psychiatric:         Mood and Affect: Mood normal.         Behavior: Behavior normal.          Current Medications:   amLODIPine  5 mg Oral Daily    aspirin  81 mg Oral Daily    atorvastatin  40 mg Oral QHS    carvediloL  25 mg Oral BID WM    tamsulosin  0.4 mg Oral Daily         Current Facility-Administered Medications:     acetaminophen, 650 mg, Oral, Q4H PRN    benzonatate, 100 mg, Oral, TID PRN    glucagon (human recombinant), 1 mg, Intramuscular, PRN    glucose, 16 g, Oral, PRN    glucose, 24 g, Oral, PRN    hydrALAZINE, 25 mg, Oral, Q8H PRN    insulin aspart U-100, 0-5 Units, Subcutaneous, QID (AC + HS) PRN    magnesium oxide, 800 mg, Oral, PRN    magnesium oxide, 800 mg, Oral, PRN    melatonin, 6 mg, Oral, Nightly PRN    naloxone, 0.02 mg, Intravenous, PRN    senna-docusate 8.6-50 mg, 1 tablet, Oral, Daily PRN    sodium chloride 0.9%, 10 mL, Intravenous, PRN    Laboratory (all  labs reviewed):  CBC:  Recent Labs   Lab 04/20/24  1143 04/21/24  0429 05/01/24 2140 06/16/24  1212 06/17/24  0420   WBC 8.41 10.37 8.67 8.26 8.94   Hemoglobin 13.4 L 13.2 L 13.9 L 13.2 L 13.9 L   Hematocrit 38.9 L 40.0 40.1 38.2 L 42.1   Platelets 193 203 198 199 221       CHEMISTRIES:  Recent Labs   Lab 04/21/24  0429 05/01/24 2140 05/07/24 0752 06/16/24 1212 06/17/24  0420   Glucose 125 H 306 H 229 H 179 H 151 H   Sodium 140 139 138 138 138   Potassium 3.6 4.3 4.0 3.9 3.9   BUN 12 13 27 H 13 11   Creatinine 1.0 1.2 1.2 1.3 1.1   eGFR >60 >60 >60 59 A >60   Calcium 9.5 10.0 9.1 9.8 9.6   Magnesium  --  1.3 L  --   --   --        CARDIAC BIOMARKERS:  Recent Labs   Lab 07/20/23 1213 02/28/24 1625 02/28/24  1926 04/20/24  1215 06/16/24  1212 06/16/24  1744 06/17/24  0021     --   --   --   --   --   --    Troponin I  --    < > <0.006 0.012 0.010 0.017 0.022    < > = values in this interval not displayed.       COAGS:  Recent Labs   Lab 07/07/23  1212   INR 1.2       LIPIDS/LFTS:  Recent Labs   Lab 04/21/24 0429 05/01/24 2140 05/07/24 0752 06/16/24  1212 06/17/24  0420   Cholesterol  --   --  109 L  --   --    Triglycerides  --   --  60  --   --    HDL  --   --  39 L  --   --    LDL Cholesterol  --   --  58.0 L  --   --    Non-HDL Cholesterol  --   --  70  --   --    AST 22 22 18 20 19   ALT 21 17 34 20 20       BNP:  Recent Labs   Lab 02/28/24  1625 05/01/24 2140 06/16/24  1212   BNP 51 65 194 H       TSH:        Free T4:        Diagnostic Results:  ECG (personally reviewed and interpreted tracing(s)):  6/16/24 1042 SR 94, PAC, NSSTTW changes, ?LVH    Chest X-Ray (personally reviewed and interpreted image(s)): 6/16/24 ILD    Echo: 2/19/24 (repeat ordered)    Moderately decreased left ventricular systolic function.     Left ventricular ejection fraction is estimated at 40%.     Grade I diastolic dysfunction (abnormal relaxation filling pattern), normal to mildly elevated filling pressures.     RVSP  could not be calculated due to incomplete tricuspid regurgitation velocity profile.     Structurally normal mitral valve.     Structurally normal trileaflet aortic valve.     Structurally normal tricuspid valve.     Mild aortic dilatation of the sinuses of valsalva 3.9cm.     Stress Test: (per Gagandeep note 3/2022)  March 21, 2022 Lexiscan MPI stress test  Gated Mibi EF 51% normal function  Large defect severe intensity mid anterior apically anterior apex April cool lateral fixed  No ischemic defects seen       Assessment and Plan:     * Chest pain  Atypical symptoms in setting of ILD and hypertension.    Troponin EKG negative.    Echocardiogram ordered.    Background history of CAD status post PCI.  Last stress test was in March 20, 2022 noting fixed defects.    Pros and cons of coronary angiography versus repeat stress testing discussed with the patient we decided to proceed with exercise nuclear stress testing.    Continue medical therapy to include aspirin and statin.    Coronary artery disease involving native coronary artery of native heart with angina pectoris  As per chest pain section.    Type 2 diabetes mellitus without complication, without long-term current use of insulin  Management per Internal Medicine.    Interstitial lung disease  Follows with Pulmonary on home oxygen.    Essential hypertension  Continue medical therapy.  Consider addition of ARB    Hyperlipidemia    Continue statin.    Chronic HFrEF (heart failure with reduced ejection fraction)  Repeat echo planned.    Patient appears euvolemic.    Known mid-range LV systolic dysfunction by previous testing.        VTE Risk Mitigation (From admission, onward)           Ordered     Place ANTONIETTA hose  Until discontinued         06/16/24 1432     IP VTE HIGH RISK PATIENT  Once         06/16/24 1432     Place sequential compression device  Until discontinued         06/16/24 1432                    Thank you for your consult. I will follow-up with patient.  Please contact us if you have any additional questions.    Brandon Ventura MD  Cardiology   [facility]    Addendum 12pm:    L MPI 6/17/24 (images personally reviewed and interpreted)    Abnormal myocardial perfusion scan.    There is a severe intensity, medium sized, mostly fixed perfusion abnormality with minimal reversibilty in the mid to apical anterior and apical wall(s) in the typical distribution of the LAD territory.  This is conssistent with scar and minimal periinfarct ischemia.    There are no other significant perfusion abnormalities.    The gated perfusion images showed an ejection fraction of 40% post stress.    There is anteroapical wall akinesis during stress.    Echo 6/17/24 (images personally reviewed and interpreted)    Left Ventricle: The left ventricle is normal in size. Normal wall thickness. Mild global hypokinesis present, cannot exclude anterolateral WMA. There is mildly reduced systolic function with a visually estimated ejection fraction of 40 - 45%. Grade I diastolic dysfunction.    Right Ventricle: Normal right ventricular cavity size. Systolic function is normal.    Aorta: Aortic root is mildly dilated measuring 3.80 cm.    Similar findings noted on MPI 3/2022.  No further inpat cardiac testing planned.  Cardiology will sign off, pls call with questions.  Pt to follow up with me after discharge.

## 2024-06-17 NOTE — NURSING
Transported downstairs via wheelchair, no distress noted, no concerns voiced at this time, safety maintained.[facility]  Nurses Note -- 4 Eyes      6/17/2024       Skin assessed on: Discharge      [x] No Pressure Injuries Present    []Prevention Measures Documented    [] Yes LDA  for Pressure Injury Previously documented     [] Yes New Pressure Injury Discovered   [] LDA for New Pressure Injury Added      Attending RN:  Arlette Gross RN     Second RN:  JaynaRN

## 2024-06-17 NOTE — PLAN OF CARE
06/17/24 1228   Final Note   Assessment Type Final Discharge Note   Anticipated Discharge Disposition Home   Hospital Resources/Appts/Education Provided Appointments scheduled and added to AVS   Post-Acute Status   Post-Acute Authorization Other   Other Status No Post-Acute Service Needs     Pts nurse Arlette notified that the pt can d/c from CM standpoint

## 2024-06-17 NOTE — ASSESSMENT & PLAN NOTE
Repeat echo planned.    Patient appears euvolemic.    Known mid-range LV systolic dysfunction by previous testing.

## 2024-06-17 NOTE — NURSING
Nurses Note -- 4 Eyes      6/17/2024   3:29 AM      Skin assessed during: Q Shift Change      [x] No Altered Skin Integrity Present    [x]Prevention Measures Documented      [] Yes- Altered Skin Integrity Present or Discovered   [] LDA Added if Not in Epic (Describe Wound)   [] New Altered Skin Integrity was Present on Admit and Documented in LDA   [] Wound Image Taken    Wound Care Consulted? No    Attending Nurse:  KANDI Duval     Second RN/Staff Member:  KANDI Cameron          24 hours

## 2024-06-17 NOTE — PLAN OF CARE
Problem: Adult Inpatient Plan of Care  Goal: Plan of Care Review  Outcome: Progressing  Goal: Patient-Specific Goal (Individualized)  Outcome: Progressing  Goal: Absence of Hospital-Acquired Illness or Injury  Outcome: Progressing  Goal: Optimal Comfort and Wellbeing  Outcome: Progressing     Problem: Diabetes Comorbidity  Goal: Blood Glucose Level Within Targeted Range  Outcome: Progressing     POC reviewed with patient. All questions and concerns addressed. Fall/safety precautions implemented and maintained. One time dose hydralazine administered d/t /108. Rechecked /104 then 172/94. Blood glucose monitored. PRN tessalon perle administered. NPO after MN. No acute events noted this shift. Please see flowsheet for full assessment and vitals. Bed locked in lowest position. Side rails up x2. Call bell within reach.

## 2024-06-19 ENCOUNTER — TELEPHONE (OUTPATIENT)
Dept: PULMONOLOGY | Facility: CLINIC | Age: 74
End: 2024-06-19
Payer: MEDICARE

## 2024-06-19 LAB
OHS QRS DURATION: 92 MS
OHS QTC CALCULATION: 440 MS

## 2024-06-19 NOTE — TELEPHONE ENCOUNTER
----- Message from Maciej TIAGO Route sent at 6/19/2024  9:51 AM CDT -----  Regarding: Nebulizer  Contact: pt. [phone]  Pt is calling in ref to being prescribed medication      albuterol-ipratropium (DUO-NEB) 2.5 mg-0.5 mg/3 mL nebulizer solution       he was told by pharmacist he needs a nebulizer to get it to work. Asking to speak with someone. Patient Requesting Call Back @ [phone]         [facility] [id number] [place] [address] EXPY AT [facility] OF [address] [address]  Phone: [phone] Fax: [phone]

## 2024-06-19 NOTE — TELEPHONE ENCOUNTER
Call was returned to patient in regards to his nebulizer. Patient states he never received his nebulizer. I informed patient that I faxed his nebulizer order to [facility]. Patient verbalized understanding.

## 2024-06-21 ENCOUNTER — OFFICE VISIT (OUTPATIENT)
Dept: CARDIOLOGY | Facility: CLINIC | Age: 74
End: 2024-06-21
Payer: MEDICARE

## 2024-06-21 ENCOUNTER — TELEPHONE (OUTPATIENT)
Dept: PULMONOLOGY | Facility: CLINIC | Age: 74
End: 2024-06-21
Payer: MEDICARE

## 2024-06-21 VITALS
HEIGHT: 68 IN | SYSTOLIC BLOOD PRESSURE: 140 MMHG | RESPIRATION RATE: 18 BRPM | HEART RATE: 89 BPM | DIASTOLIC BLOOD PRESSURE: 82 MMHG | BODY MASS INDEX: 21.95 KG/M2 | WEIGHT: 144.81 LBS | OXYGEN SATURATION: 94 %

## 2024-06-21 DIAGNOSIS — I50.22 HEART FAILURE WITH MID-RANGE EJECTION FRACTION (HFMEF): Primary | ICD-10-CM

## 2024-06-21 DIAGNOSIS — E11.69 DYSLIPIDEMIA ASSOCIATED WITH TYPE 2 DIABETES MELLITUS: ICD-10-CM

## 2024-06-21 DIAGNOSIS — E78.5 DYSLIPIDEMIA ASSOCIATED WITH TYPE 2 DIABETES MELLITUS: ICD-10-CM

## 2024-06-21 DIAGNOSIS — E11.59 HYPERTENSION ASSOCIATED WITH DIABETES: ICD-10-CM

## 2024-06-21 DIAGNOSIS — E78.2 MIXED HYPERLIPIDEMIA: ICD-10-CM

## 2024-06-21 DIAGNOSIS — R06.09 DOE (DYSPNEA ON EXERTION): ICD-10-CM

## 2024-06-21 DIAGNOSIS — I15.2 HYPERTENSION ASSOCIATED WITH DIABETES: ICD-10-CM

## 2024-06-21 DIAGNOSIS — I25.10 CORONARY ARTERY DISEASE INVOLVING NATIVE CORONARY ARTERY OF NATIVE HEART WITHOUT ANGINA PECTORIS: ICD-10-CM

## 2024-06-21 DIAGNOSIS — I10 ESSENTIAL HYPERTENSION: ICD-10-CM

## 2024-06-21 DIAGNOSIS — I77.810 AORTIC ROOT DILATATION: ICD-10-CM

## 2024-06-21 PROCEDURE — 99999 PR PBB SHADOW E&M-EST. PATIENT-LVL V: CPT | Mod: PBBFAC,,, | Performed by: INTERNAL MEDICINE

## 2024-06-21 RX ORDER — FUROSEMIDE 20 MG/1
20 TABLET ORAL DAILY
Qty: 90 TABLET | Refills: 3 | Status: SHIPPED | OUTPATIENT
Start: 2024-06-21 | End: 2025-06-21

## 2024-06-21 RX ORDER — SACUBITRIL AND VALSARTAN 24; 26 MG/1; MG/1
1 TABLET, FILM COATED ORAL 2 TIMES DAILY
Qty: 180 TABLET | Refills: 3 | Status: SHIPPED | OUTPATIENT
Start: 2024-06-21

## 2024-06-21 NOTE — TELEPHONE ENCOUNTER
Call was returned to patient in regards to his nebulizer order. Patient stated he'll like the order fax to his pharmacy.     Nebulizer order was faxed to [facility] on 6/21/24 and confirmed at 2:52 PM.

## 2024-06-21 NOTE — TELEPHONE ENCOUNTER
----- Message from Martín Ash sent at 6/21/2024  2:29 PM CDT -----  Consult/Advisory    Name Of Caller: Emmanuel       Contact Preference:[phone]    Nature of call: Ptn called regarding the machine he will like to know if it was sent to Pharmacy Contact    Telephone Fax  [phone]

## 2024-06-21 NOTE — PROGRESS NOTES
"CARDIOVASCULAR PROGRESS NOTE    REASON FOR CONSULT:   Emmanuel Grant is a 73 y.o. male who presents for follow up of CAD/ICM/HFmEF.    PCP: Mirella Schmitt  HISTORY OF PRESENT ILLNESS:   The patient returns for follow up of his recent hospitalization.  He underwent stress testing revealing a predominantly fixed LAD defect with mild LV dysfunction.  He returns today complaining of persistent dyspnea on exertion without angina.  There has been no palpitations or syncope.  Denies PND, orthopnea, melena, hematuria, or claudication symptoms.  There was no lower extremity edema.  She does have crackles on examination but this can be difficult with his known interstitial lung disease.  His BNP was elevated during his recent hospitalization suggesting some degree of volume overload.    CARDIOVASCULAR HISTORY:   CAD/PCI, mild isch CMP (echo 6/2024, MPI with LAD scar 6/2024)    Ao root dil, 3.8cm (echo 6/2024)    PAST MEDICAL HISTORY:     Past Medical History:   Diagnosis Date    CAD (coronary artery disease)     Sees [facility], h/o stent    Diabetes mellitus     Hypertension     Kidney stone     Stroke     age 62       PAST SURGICAL HISTORY:     Past Surgical History:   Procedure Laterality Date    CARDIAC CATHETERIZATION      with stent    COLONOSCOPY N/A 03/27/2024    Procedure: COLONOSCOPY;  Surgeon: Ariela Castro MD;  Location: [facility] ENDO;  Service: Endoscopy;  Laterality: N/A;    ESOPHAGOGASTRODUODENOSCOPY N/A 03/27/2024    Procedure: EGD (ESOPHAGOGASTRODUODENOSCOPY);  Surgeon: Ariela Castro MD;  Location: [facility];  Service: Endoscopy;  Laterality: N/A;    SHOULDER SURGERY Right        ALLERGIES AND MEDICATION:     Review of patient's allergies indicates:   Allergen Reactions    Dapagliflozin      Other reaction(s): Other (See Comments)    Pcn [penicillins]     Linagliptin Other (See Comments)     "it knocked me down", "it almost killed me"    Lisinopril Other (See Comments)     cough    Pantoprazole " Hives        Medication List            Accurate as of June 21, 2024  1:30 PM. If you have any questions, ask your nurse or doctor.                CONTINUE taking these medications      albuterol-ipratropium 2.5 mg-0.5 mg/3 mL nebulizer solution  Commonly known as: DUO-NEB  Take 3 mLs by nebulization every 6 (six) hours as needed for Wheezing. Rescue     amLODIPine 10 MG tablet  Commonly known as: NORVASC  Take 1 tablet (10 mg total) by mouth once daily.     aspirin 81 MG Chew     atorvastatin 40 MG tablet  Commonly known as: LIPITOR  Take 1 tablet (40 mg total) by mouth every evening.     BEANO ORAL     blood sugar diagnostic Strp  To check BG 1 times daily, to use with insurance preferred meter     blood-glucose meter kit  To check BG 1 times daily, to use with insurance preferred meter     carvediloL 25 MG tablet  Commonly known as: COREG  Take 1 tablet (25 mg total) by mouth 2 (two) times daily with meals.     cholecalciferol (vitamin D3) 50 mcg (2,000 unit) Cap capsule  Commonly known as: VITAMIN D3     DRY EYE RELIEF OPHT     famotidine 20 MG tablet  Commonly known as: PEPCID  TAKE 1 TABLET(20 MG) BY MOUTH TWICE DAILY     * fluticasone propionate 50 mcg/actuation nasal spray  Commonly known as: FLONASE     * fluticasone propionate 50 mcg/actuation nasal spray  Commonly known as: FLONASE  1 spray (50 mcg total) by Each Nostril route once daily.     lancets Misc  To check BG 1 times daily, to use with insurance preferred meter     latanoprost 0.005 % ophthalmic solution     magnesium citrate solution  Take half the bottle for constipation as needed.     metFORMIN 1000 MG tablet  Commonly known as: GLUCOPHAGE  Take 0.5 tablets (500 mg total) by mouth daily with breakfast.     montelukast 10 mg tablet  Commonly known as: SINGULAIR     polyethylene glycol 17 gram Pwpk  Commonly known as: GLYCOLAX     SENNA WITH DOCUSATE SODIUM 8.6-50 mg per tablet  Generic drug: senna-docusate 8.6-50 mg     sucralfate 1 gram  tablet  Commonly known as: CARAFATE     tamsulosin 0.4 mg Cap  Commonly known as: FLOMAX  Take 1 capsule (0.4 mg total) by mouth once daily.           * This list has 2 medication(s) that are the same as other medications prescribed for you. Read the directions carefully, and ask your doctor or other care provider to review them with you.                  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   HENT:  Negative for nosebleeds.    Eyes:  Negative for blurred vision, double vision and photophobia.   Respiratory:  Positive for shortness of breath. Negative for hemoptysis and wheezing.    Cardiovascular:  Negative for chest pain, palpitations, orthopnea, claudication, leg swelling and PND.   Gastrointestinal:  Negative for abdominal pain, blood in stool, heartburn, melena, nausea and vomiting.   Genitourinary:  Negative for flank pain and hematuria.   Musculoskeletal:  Negative for falls, myalgias and neck pain.   Skin:  Negative for rash.   Neurological:  Negative for dizziness, seizures, loss of consciousness, weakness and headaches.   Endo/Heme/Allergies:  Negative for polydipsia. Does not bruise/bleed easily.   Psychiatric/Behavioral:  Negative for depression and memory loss. The patient is not nervous/anxious.        PHYSICAL EXAM:     Vitals:    06/21/24 1307   BP: (!) 140/82   Pulse: 89   Resp: 18    Body mass index is 22.02  "kg/m².  Weight: 65.7 kg (144 lb 13.5 oz)   Height: 5' 8" (172.7 cm)     Physical Exam  Vitals reviewed.   Constitutional:       General: He is not in acute distress.     Appearance: Normal appearance. He is well-developed and normal weight. He is not ill-appearing, toxic-appearing or diaphoretic.   HENT:      Head: Normocephalic and atraumatic.   Eyes:      General: No scleral icterus.     Extraocular Movements: Extraocular movements intact.      Conjunctiva/sclera: Conjunctivae normal.      Pupils: Pupils are equal, round, and reactive to light.   Neck:      Thyroid: No thyromegaly.      Vascular: Normal carotid pulses. No carotid bruit or JVD.      Trachea: Trachea normal.   Cardiovascular:      Rate and Rhythm: Normal rate and regular rhythm.      Pulses:           Carotid pulses are 2+ on the right side and 2+ on the left side.     Heart sounds: S1 normal and S2 normal. No murmur heard.     No friction rub. No gallop.   Pulmonary:      Effort: Pulmonary effort is normal. No respiratory distress.      Breath sounds: No stridor. Rales present. No wheezing or rhonchi.   Chest:      Chest wall: No tenderness.   Abdominal:      General: There is no distension.      Palpations: Abdomen is soft.   Musculoskeletal:         General: No swelling or tenderness. Normal range of motion.      Cervical back: Normal range of motion and neck supple. No edema or rigidity.      Right lower leg: No edema.      Left lower leg: No edema.   Feet:      Right foot:      Skin integrity: No ulcer.      Left foot:      Skin integrity: No ulcer.   Skin:     General: Skin is warm and dry.      Coloration: Skin is not jaundiced.   Neurological:      General: No focal deficit present.      Mental Status: He is alert and oriented to person, place, and time.      Cranial Nerves: No cranial nerve deficit.   Psychiatric:         Mood and Affect: Mood normal.         Speech: Speech normal.         Behavior: Behavior normal. Behavior is cooperative. "         DATA:   EKG: (personally reviewed tracing(s))  6/16/24 SR 94, PAC, NSSTTW changes, ?LVH    Laboratory:  CBC:  Recent Labs   Lab 05/01/24 2140 06/16/24 1212 06/17/24  0420   WBC 8.67 8.26 8.94   Hemoglobin 13.9 L 13.2 L 13.9 L   Hematocrit 40.1 38.2 L 42.1   Platelets 198 199 221       CHEMISTRIES:  Recent Labs   Lab 04/21/24  0429 05/01/24 2140 05/07/24 0752 06/16/24  1212 06/17/24  0420   Glucose 125 H 306 H 229 H 179 H 151 H   Sodium 140 139 138 138 138   Potassium 3.6 4.3 4.0 3.9 3.9   BUN 12 13 27 H 13 11   Creatinine 1.0 1.2 1.2 1.3 1.1   eGFR >60 >60 >60 59 A >60   Calcium 9.5 10.0 9.1 9.8 9.6   Magnesium  --  1.3 L  --   --   --        CARDIAC BIOMARKERS:  Recent Labs   Lab 07/20/23  1213 02/28/24  1625 06/16/24  1212 06/16/24  1744 06/17/24  0021     --   --   --   --    Troponin I  --    < > 0.010 0.017 0.022    < > = values in this interval not displayed.       COAGS:  Recent Labs   Lab 07/07/23  1212   INR 1.2       LIPIDS/LFTS:  Recent Labs   Lab 05/07/24 0752 06/16/24 1212 06/17/24  0420   Cholesterol 109 L  --   --    Triglycerides 60  --   --    HDL 39 L  --   --    LDL Cholesterol 58.0 L  --   --    Non-HDL Cholesterol 70  --   --    AST 18 20 19   ALT 34 20 20         Cardiovascular Testing:  L MPI 6/17/24     Abnormal myocardial perfusion scan.    There is a severe intensity, medium sized, mostly fixed perfusion abnormality with minimal reversibilty in the mid to apical anterior and apical wall(s) in the typical distribution of the LAD territory.  This is conssistent with scar and minimal periinfarct ischemia.    There are no other significant perfusion abnormalities.    The gated perfusion images showed an ejection fraction of 40% post stress.    There is anteroapical wall akinesis during stress.     Echo 6/17/24 (images personally reviewed and interpreted)    Left Ventricle: The left ventricle is normal in size. Normal wall thickness. Mild global hypokinesis present, cannot  exclude anterolateral WMA. There is mildly reduced systolic function with a visually estimated ejection fraction of 40 - 45%. Grade I diastolic dysfunction.    Right Ventricle: Normal right ventricular cavity size. Systolic function is normal.    Aorta: Aortic root is mildly dilated measuring 3.80 cm.    ASSESSMENT:   # CAD/MI/PCI, MPI 6/2024 with predom fixed LAD defect and mild LV dysfxn.  # ?HFmEF, some crackles and SOB on exam today (but also with ILD)  # ILD, follows with Dr. Schmitt  # DM  # HTN, uncontrolled  # HLP on atorva 40mg  # ao root dil, 3.8cm (echo 6/2024)     PLAN:   Cont med rx  Cont ASA 81mg qd  Add lasix 20mg qd  Add Entresto 24/26mg bid  Check BMP 1 week  RTC 1 month (July 2024).  If BOSTON persist or anginal sxs develop despite max med rx, will need cath.  Surveillance echo 6 months (Dec 2024)     The above documents medical care services that are part of ongoing care related to this patient's serious/complex condition (Code ) (CAD/CHF/HTN/HLP).        Brandon Ventura MD, FACC

## 2024-06-28 ENCOUNTER — LAB VISIT (OUTPATIENT)
Dept: LAB | Facility: HOSPITAL | Age: 74
End: 2024-06-28
Attending: INTERNAL MEDICINE
Payer: MEDICARE

## 2024-06-28 DIAGNOSIS — I50.22 HEART FAILURE WITH MID-RANGE EJECTION FRACTION (HFMEF): ICD-10-CM

## 2024-06-28 LAB
ANION GAP SERPL CALC-SCNC: 10 MMOL/L (ref 8–16)
BUN SERPL-MCNC: 15 MG/DL (ref 8–23)
CALCIUM SERPL-MCNC: 9.3 MG/DL (ref 8.7–10.5)
CHLORIDE SERPL-SCNC: 103 MMOL/L (ref 95–110)
CO2 SERPL-SCNC: 24 MMOL/L (ref 23–29)
CREAT SERPL-MCNC: 1.2 MG/DL (ref 0.5–1.4)
EST. GFR  (NO RACE VARIABLE): >60 ML/MIN/1.73 M^2
GLUCOSE SERPL-MCNC: 242 MG/DL (ref 70–110)
POTASSIUM SERPL-SCNC: 3.9 MMOL/L (ref 3.5–5.1)
SODIUM SERPL-SCNC: 137 MMOL/L (ref 136–145)

## 2024-06-28 PROCEDURE — 36415 COLL VENOUS BLD VENIPUNCTURE: CPT | Performed by: INTERNAL MEDICINE

## 2024-06-28 PROCEDURE — 80048 BASIC METABOLIC PNL TOTAL CA: CPT | Performed by: INTERNAL MEDICINE

## 2024-06-29 ENCOUNTER — HOSPITAL ENCOUNTER (EMERGENCY)
Facility: HOSPITAL | Age: 74
Discharge: HOME | End: 2024-06-29
Attending: EMERGENCY MEDICINE
Payer: MEDICARE

## 2024-06-29 VITALS
DIASTOLIC BLOOD PRESSURE: 83 MMHG | TEMPERATURE: 99 F | RESPIRATION RATE: 19 BRPM | SYSTOLIC BLOOD PRESSURE: 118 MMHG | WEIGHT: 144 LBS | BODY MASS INDEX: 21.9 KG/M2 | OXYGEN SATURATION: 97 % | HEART RATE: 90 BPM

## 2024-06-29 DIAGNOSIS — R06.02 SOB (SHORTNESS OF BREATH): ICD-10-CM

## 2024-06-29 DIAGNOSIS — J84.9 INTERSTITIAL LUNG DISEASE: Primary | ICD-10-CM

## 2024-06-29 DIAGNOSIS — I50.42 CHRONIC COMBINED SYSTOLIC AND DIASTOLIC CONGESTIVE HEART FAILURE: ICD-10-CM

## 2024-06-29 LAB
ALBUMIN SERPL BCP-MCNC: 3.7 G/DL (ref 3.5–5.2)
ALP SERPL-CCNC: 61 U/L (ref 55–135)
ALT SERPL W/O P-5'-P-CCNC: 15 U/L (ref 10–44)
ANION GAP SERPL CALC-SCNC: 11 MMOL/L (ref 8–16)
AST SERPL-CCNC: 17 U/L (ref 10–40)
BASOPHILS # BLD AUTO: 0.03 K/UL (ref 0–0.2)
BASOPHILS NFR BLD: 0.3 % (ref 0–1.9)
BILIRUB SERPL-MCNC: 0.6 MG/DL (ref 0.1–1)
BNP SERPL-MCNC: 45 PG/ML (ref 0–99)
BUN SERPL-MCNC: 21 MG/DL (ref 8–23)
CALCIUM SERPL-MCNC: 9.9 MG/DL (ref 8.7–10.5)
CHLORIDE SERPL-SCNC: 103 MMOL/L (ref 95–110)
CO2 SERPL-SCNC: 23 MMOL/L (ref 23–29)
CREAT SERPL-MCNC: 1.2 MG/DL (ref 0.5–1.4)
CTP QC/QA: YES
DIFFERENTIAL METHOD BLD: ABNORMAL
EOSINOPHIL # BLD AUTO: 0.7 K/UL (ref 0–0.5)
EOSINOPHIL NFR BLD: 6.4 % (ref 0–8)
ERYTHROCYTE [DISTWIDTH] IN BLOOD BY AUTOMATED COUNT: 12.5 % (ref 11.5–14.5)
EST. GFR  (NO RACE VARIABLE): >60 ML/MIN/1.73 M^2
GLUCOSE SERPL-MCNC: 214 MG/DL (ref 70–110)
HCT VFR BLD AUTO: 42.5 % (ref 40–54)
HGB BLD-MCNC: 14.3 G/DL (ref 14–18)
IMM GRANULOCYTES # BLD AUTO: 0.03 K/UL (ref 0–0.04)
IMM GRANULOCYTES NFR BLD AUTO: 0.3 % (ref 0–0.5)
LYMPHOCYTES # BLD AUTO: 2 K/UL (ref 1–4.8)
LYMPHOCYTES NFR BLD: 18.2 % (ref 18–48)
MAGNESIUM SERPL-MCNC: 1.4 MG/DL (ref 1.6–2.6)
MCH RBC QN AUTO: 27.9 PG (ref 27–31)
MCHC RBC AUTO-ENTMCNC: 33.6 G/DL (ref 32–36)
MCV RBC AUTO: 83 FL (ref 82–98)
MONOCYTES # BLD AUTO: 0.8 K/UL (ref 0.3–1)
MONOCYTES NFR BLD: 7 % (ref 4–15)
NEUTROPHILS # BLD AUTO: 7.6 K/UL (ref 1.8–7.7)
NEUTROPHILS NFR BLD: 67.8 % (ref 38–73)
NRBC BLD-RTO: 0 /100 WBC
PLATELET # BLD AUTO: 226 K/UL (ref 150–450)
PMV BLD AUTO: 10.5 FL (ref 9.2–12.9)
POTASSIUM SERPL-SCNC: 3.9 MMOL/L (ref 3.5–5.1)
PROT SERPL-MCNC: 7.5 G/DL (ref 6–8.4)
RBC # BLD AUTO: 5.12 M/UL (ref 4.6–6.2)
SARS-COV-2 RDRP RESP QL NAA+PROBE: NEGATIVE
SODIUM SERPL-SCNC: 137 MMOL/L (ref 136–145)
TROPONIN I SERPL DL<=0.01 NG/ML-MCNC: <0.006 NG/ML (ref 0–0.03)
WBC # BLD AUTO: 11.12 K/UL (ref 3.9–12.7)

## 2024-06-29 PROCEDURE — 94640 AIRWAY INHALATION TREATMENT: CPT | Mod: XB

## 2024-06-29 PROCEDURE — 93010 ELECTROCARDIOGRAM REPORT: CPT | Mod: ,,, | Performed by: INTERNAL MEDICINE

## 2024-06-29 PROCEDURE — 27000221 HC OXYGEN, UP TO 24 HOURS

## 2024-06-29 PROCEDURE — 80053 COMPREHEN METABOLIC PANEL: CPT | Performed by: EMERGENCY MEDICINE

## 2024-06-29 PROCEDURE — 87635 SARS-COV-2 COVID-19 AMP PRB: CPT | Performed by: EMERGENCY MEDICINE

## 2024-06-29 PROCEDURE — 83880 ASSAY OF NATRIURETIC PEPTIDE: CPT | Performed by: EMERGENCY MEDICINE

## 2024-06-29 PROCEDURE — 83735 ASSAY OF MAGNESIUM: CPT | Performed by: EMERGENCY MEDICINE

## 2024-06-29 PROCEDURE — 85025 COMPLETE CBC W/AUTO DIFF WBC: CPT | Performed by: EMERGENCY MEDICINE

## 2024-06-29 PROCEDURE — 99284 EMERGENCY DEPT VISIT MOD MDM: CPT | Mod: 25

## 2024-06-29 PROCEDURE — 93005 ELECTROCARDIOGRAM TRACING: CPT

## 2024-06-29 PROCEDURE — 25000003 PHARM REV CODE 250: Performed by: EMERGENCY MEDICINE

## 2024-06-29 PROCEDURE — 94761 N-INVAS EAR/PLS OXIMETRY MLT: CPT

## 2024-06-29 PROCEDURE — 25000242 PHARM REV CODE 250 ALT 637 W/ HCPCS: Performed by: EMERGENCY MEDICINE

## 2024-06-29 PROCEDURE — 63600175 PHARM REV CODE 636 W HCPCS: Performed by: EMERGENCY MEDICINE

## 2024-06-29 PROCEDURE — 84484 ASSAY OF TROPONIN QUANT: CPT | Performed by: EMERGENCY MEDICINE

## 2024-06-29 RX ORDER — LIDOCAINE HYDROCHLORIDE 20 MG/ML
15 SOLUTION OROPHARYNGEAL ONCE
Status: COMPLETED | OUTPATIENT
Start: 2024-06-29 | End: 2024-06-29

## 2024-06-29 RX ORDER — FUROSEMIDE 10 MG/ML
20 INJECTION INTRAMUSCULAR; INTRAVENOUS
Status: COMPLETED | OUTPATIENT
Start: 2024-06-29 | End: 2024-06-29

## 2024-06-29 RX ORDER — ALUMINUM HYDROXIDE, MAGNESIUM HYDROXIDE, AND SIMETHICONE 1200; 120; 1200 MG/30ML; MG/30ML; MG/30ML
30 SUSPENSION ORAL ONCE
Status: COMPLETED | OUTPATIENT
Start: 2024-06-29 | End: 2024-06-29

## 2024-06-29 RX ORDER — IPRATROPIUM BROMIDE AND ALBUTEROL SULFATE 2.5; .5 MG/3ML; MG/3ML
3 SOLUTION RESPIRATORY (INHALATION)
Status: COMPLETED | OUTPATIENT
Start: 2024-06-29 | End: 2024-06-29

## 2024-06-29 RX ADMIN — LIDOCAINE HYDROCHLORIDE 15 ML: 20 SOLUTION ORAL at 11:06

## 2024-06-29 RX ADMIN — IPRATROPIUM BROMIDE AND ALBUTEROL SULFATE 3 ML: 2.5; .5 SOLUTION RESPIRATORY (INHALATION) at 11:06

## 2024-06-29 RX ADMIN — IPRATROPIUM BROMIDE AND ALBUTEROL SULFATE 3 ML: 2.5; .5 SOLUTION RESPIRATORY (INHALATION) at 01:06

## 2024-06-29 RX ADMIN — ALUMINUM HYDROXIDE, MAGNESIUM HYDROXIDE, AND SIMETHICONE 30 ML: 200; 200; 20 SUSPENSION ORAL at 11:06

## 2024-06-29 RX ADMIN — FUROSEMIDE 20 MG: 10 INJECTION, SOLUTION INTRAVENOUS at 01:06

## 2024-06-29 NOTE — ED PROVIDER NOTES
"Encounter Date: 6/29/2024    SCRIBE #1 NOTE: I, Mely Newman, am scribing for, and in the presence of,  Clemente Kidd MD. I have scribed the following portions of the note - Other sections scribed: HPI, ROS, PE.       History     Chief Complaint   Patient presents with    Shortness of Breath     Pt to ED c/o SOB. Pt states he seen a pulmonologist approx 8 days ago. Pt uses 2 L of oxygen at home. Pt currently no on any.      Emmanuel Grant is a 73 y.o. male, with a PMHx of Interstitial lung disease, CAD, HTN, HLD, GERD, and NIDDM, that presents to the ED for shortness of breath that has been occurring intermittently since this morning. The patient reports seeing a pulmonologist on 6/17/24, but is unsure what he was diagnosed with. The patient also reports having a fever and cough with clear sputum a few days ago, but denies those symptoms currently. Denies any known sick contacts. Denies chest pain, cold symptoms, vomiting, diarrhea, abdominal pain, or any other complaints at this time.     Other relevant history includes that the patient uses 2 L of oxygen at home as needed. Denies current usage. Denies Shx of smoking. Notes prescription, Pepcid, is inefficient in treating his GERD symptoms.    The history is provided by the patient. No  was used.     Review of patient's allergies indicates:   Allergen Reactions    Dapagliflozin      Other reaction(s): Other (See Comments)    Pcn [penicillins]     Linagliptin Other (See Comments)     "it knocked me down", "it almost killed me"    Lisinopril Other (See Comments)     cough    Pantoprazole Hives     Past Medical History:   Diagnosis Date    CAD (coronary artery disease)     Sees [facility], h/o stent    Diabetes mellitus     Hypertension     Kidney stone     Stroke     age 62     Past Surgical History:   Procedure Laterality Date    CARDIAC CATHETERIZATION      with stent    COLONOSCOPY N/A 03/27/2024    Procedure: COLONOSCOPY;  Surgeon: " Ariela Castro MD;  Location: [facility] ENDO;  Service: Endoscopy;  Laterality: N/A;    ESOPHAGOGASTRODUODENOSCOPY N/A 03/27/2024    Procedure: EGD (ESOPHAGOGASTRODUODENOSCOPY);  Surgeon: Ariela Castro MD;  Location: [facility] ENDO;  Service: Endoscopy;  Laterality: N/A;    SHOULDER SURGERY Right      Family History   Problem Relation Name Age of Onset    Cancer Mother      Colon cancer Sister      Esophageal cancer Neg Hx       Social History     Tobacco Use    Smoking status: Never     Passive exposure: Never    Smokeless tobacco: Never   Substance Use Topics    Alcohol use: No    Drug use: Never     Review of Systems   Constitutional:  Positive for fever. Negative for chills.   HENT:  Negative for sore throat.    Respiratory:  Positive for cough and shortness of breath.    Cardiovascular:  Negative for chest pain and leg swelling.   Gastrointestinal:  Negative for abdominal pain, diarrhea, nausea and vomiting.   Genitourinary:  Negative for dysuria and hematuria.   Musculoskeletal:  Negative for back pain and neck pain.   Skin:  Negative for rash.   Neurological:  Negative for syncope.       Physical Exam     Initial Vitals [06/29/24 0945]   BP Pulse Resp Temp SpO2   111/72 86 20 98.7 °F (37.1 °C) 96 %      MAP       --         Physical Exam    Nursing note and vitals reviewed.  Constitutional: He appears well-developed and well-nourished.   HENT:   Head: Atraumatic.   Eyes: EOM are normal. Pupils are equal, round, and reactive to light.   Neck: Neck supple. No JVD present.   Normal range of motion.  Cardiovascular:  Normal rate, regular rhythm, normal heart sounds and intact distal pulses.     Exam reveals no gallop and no friction rub.       No murmur heard.  Pulmonary/Chest: He exhibits no edema.   Abdominal: Abdomen is soft. Bowel sounds are normal.   Musculoskeletal:         General: Normal range of motion.      Cervical back: Normal range of motion and neck supple. No edema.     Lymphadenopathy:      He has no cervical adenopathy.   Neurological: He is alert and oriented to person, place, and time. He has normal strength.   Skin: Skin is warm and dry.   Psychiatric: He has a normal mood and affect. Thought content normal.         ED Course   Procedures  Labs Reviewed   CBC W/ AUTO DIFFERENTIAL - Abnormal; Notable for the following components:       Result Value    Eos # 0.7 (*)     All other components within normal limits   COMPREHENSIVE METABOLIC PANEL - Abnormal; Notable for the following components:    Glucose 214 (*)     All other components within normal limits   MAGNESIUM - Abnormal; Notable for the following components:    Magnesium 1.4 (*)     All other components within normal limits   TROPONIN I   B-TYPE NATRIURETIC PEPTIDE   SARS-COV-2 RDRP GENE     EKG Readings: (Independently Interpreted)   Sinus rhythm rate of 91, PAC, LVH, old possible lateral infarct.     ECG Results              EKG 12-lead (Final result)        Collection Time Result Time QRS Duration OHS QTC Calculation    06/29/24 09:42:50 07/01/24 19:21:41 100 472                     Final result by Interface, Lab In [facility] (07/01/24 19:21:49)                   Narrative:    Test Reason : R06.02,    Vent. Rate : 091 BPM     Atrial Rate : 091 BPM     P-R Int : 186 ms          QRS Dur : 100 ms      QT Int : 384 ms       P-R-T Axes : 045 061 090 degrees     QTc Int : 472 ms    Sinus rhythm with Premature atrial complexes  Minimal voltage criteria for LVH, may be normal variant ( Sokolow-Westfall )  Septal infarct (cited on or before 29-JUN-2024)  Possible Lateral infarct (cited on or before 29-JUN-2024)  Abnormal ECG  When compared with ECG of 17-JUN-2024 10:23,  Significant changes have occurred  Confirmed by Jemal Drummond MD ([id number]) on 7/1/2024 7:21:38 PM    Referred By: [email or web address]   SELF           Confirmed By:Jemal Drummond MD                                  Imaging Results              X-Ray Chest AP (Final result)  Result time 06/29/24  10:14:04      Final result by Isiah Barreto MD (06/29/24 10:14:04)                   Impression:      No convincing evidence of acute detrimental change relative to prior radiograph performed 06/16/2024, 11:22 hours.      Electronically signed by: Isiah Barreto  Date:    06/29/2024  Time:    10:14               Narrative:    EXAMINATION:  XR CHEST AP PORTABLE    CLINICAL HISTORY:  SOB;    TECHNIQUE:  Single frontal view of the chest was performed.    COMPARISON:  Chest radiograph performed 06/16/2024, 11:22 hours.    FINDINGS:  Monitoring leads overlie the chest.  Grossly unchanged cardiomediastinal contours, again noted borderline enlargement cardiac silhouette and tortuosity of thoracic aorta.    Chronic interstitial coarsening appears grossly similar compared to prior examination of 1624, 11:22 hours.    No definite acute appearing focal airspace consolidation.  No definite pneumothorax or large volume pleural effusion.    No acute findings in the visualized abdomen.    Osseous and soft tissue structures appear without definite acute change.                                       Medications   aluminum-magnesium hydroxide-simethicone 200-200-20 mg/5 mL suspension 30 mL (30 mLs Oral Given 6/29/24 1148)     And   LIDOcaine viscous HCl 2% oral solution 15 mL (15 mLs Oral Given 6/29/24 1148)   albuterol-ipratropium 2.5 mg-0.5 mg/3 mL nebulizer solution 3 mL (3 mLs Nebulization Given 6/29/24 1114)   albuterol-ipratropium 2.5 mg-0.5 mg/3 mL nebulizer solution 3 mL (3 mLs Nebulization Given 6/29/24 1310)   furosemide injection 20 mg (20 mg Intravenous Given 6/29/24 1331)     Medical Decision Making  This is an emergent evaluation of a 73 y.o. male with Interstitial lung disease, CAD, HTN, HLD, GERD, and NIDDM, that presents to the ED for shortness of breath that has been occurring intermittently since this morning. The patient was seen and examined. The history and physical exam was obtained. The nursing notes and  vital signs were reviewed. Secondary to symptoms and examination findings, I ordered labs and imaging.    Amount and/or Complexity of Data Reviewed  Labs: ordered. Decision-making details documented in ED Course.  Radiology: ordered. Decision-making details documented in ED Course.    Risk  OTC drugs.  Prescription drug management.    Patient appears to be at his baseline breathing.  He is on home oxygen.  However he does not use his home oxygen when he leaves the home and walks.  He was walking with became short of breath.  He was not wearing his oxygen and became short of breath.  He is now at his baseline.  Workup shows no acute abnormalities.  No evidence of heart failure, pneumonia, pneumothorax or other acute cardiovascular or respiratory processes.  Patient is stable for discharge follow up with his pulmonologist.        Scribe Attestation:   Scribe #1: I performed the above scribed service and the documentation accurately describes the services I performed. I attest to the accuracy of the note.                         I, Clemente Baumann, personally performed the services described in this documentation. All medical record entries made by the scribe were at my direction and in my presence. I have reviewed the chart and agree that the record reflects my personal performance and is accurate and complete.        Clinical Impression:  Final diagnoses:  [R06.02] SOB (shortness of breath)  [J84.9] Interstitial lung disease (Primary)  [I50.42] Chronic combined systolic and diastolic congestive heart failure          ED Disposition Condition    Discharge Stable          ED Prescriptions    None       Follow-up Information       Follow up With Specialties Details Why Contact Info    Brandon Ventura MD Cardiology, Interventional Cardiology  follow up for recheck this week. [address]  [address]  [facility] [address]  [phone]      [facility] - Emergency Dept Emergency Medicine  As needed, If symptoms worsen  [address] [facility] [place] [address]  [phone]             Clemente Kidd MD  07/06/24 1042

## 2024-06-29 NOTE — ED NOTES
Ambulatory Oxygen Test Performed. Patient became SOB 30 seconds into walking test.  Report that SOB was less when comparing to this morning episode in [facility].  O2 sat decreased to 88% and Hr increased to 115bpm.  Patient returned to bed.  Continued to stay off of O2 while sitting in bed and O2 sat increased to 90%. When placed on 2l O2 via NC O2 sat increased to 93% and 1 minute of Oxygen increased to 97%. Md notified of results.

## 2024-06-29 NOTE — ED NOTES
Patient identifiers verified and correct for patient   C/C: shortness of breath  APPEARANCE: awake and alert with respiratory distress.  SKIN: warm, dry and intact. No breakdown or bruising.  MUSCULOSKELETAL: Patient moving all extremities spontaneously, no obvious swelling or deformities noted. Ambulates independently but with difficulty today d/t sob.  RESPIRATORY: shortness of breath with exertion. Lung sounds diminished with wheezing.   CARDIAC: Denies CP but reports tightness to chest.  + distal pulses; no peripheral edema  ABDOMEN: denies abdominal pain and n/v/d   : voids spontaneously, denies difficulty  Neurologic: AAO x 4; follows commands equal strength in all extremities; denies numbness/tingling. Denies dizziness

## 2024-07-01 LAB
OHS QRS DURATION: 100 MS
OHS QTC CALCULATION: 472 MS

## 2024-07-02 ENCOUNTER — HOSPITAL ENCOUNTER (OUTPATIENT)
Dept: CARDIOLOGY | Facility: HOSPITAL | Age: 74
Discharge: HOME | End: 2024-07-02
Attending: INTERNAL MEDICINE
Payer: MEDICARE

## 2024-07-02 ENCOUNTER — HOSPITAL ENCOUNTER (OUTPATIENT)
Dept: PULMONOLOGY | Facility: CLINIC | Age: 74
Discharge: HOME | End: 2024-07-02
Payer: MEDICARE

## 2024-07-02 ENCOUNTER — OFFICE VISIT (OUTPATIENT)
Dept: PULMONOLOGY | Facility: CLINIC | Age: 74
End: 2024-07-02
Payer: MEDICARE

## 2024-07-02 VITALS — BODY MASS INDEX: 21.99 KG/M2 | HEIGHT: 68 IN | WEIGHT: 145.06 LBS

## 2024-07-02 VITALS
WEIGHT: 145.31 LBS | HEART RATE: 79 BPM | DIASTOLIC BLOOD PRESSURE: 82 MMHG | OXYGEN SATURATION: 92 % | SYSTOLIC BLOOD PRESSURE: 118 MMHG | BODY MASS INDEX: 22.02 KG/M2 | HEIGHT: 68 IN

## 2024-07-02 VITALS
HEIGHT: 68 IN | HEART RATE: 75 BPM | DIASTOLIC BLOOD PRESSURE: 80 MMHG | SYSTOLIC BLOOD PRESSURE: 120 MMHG | WEIGHT: 145 LBS | BODY MASS INDEX: 21.98 KG/M2

## 2024-07-02 DIAGNOSIS — R09.02 HYPOXIA: Primary | ICD-10-CM

## 2024-07-02 DIAGNOSIS — J84.9 INTERSTITIAL LUNG DISEASE: ICD-10-CM

## 2024-07-02 DIAGNOSIS — R09.02 HYPOXIA: ICD-10-CM

## 2024-07-02 DIAGNOSIS — K21.9 GASTROESOPHAGEAL REFLUX DISEASE WITHOUT ESOPHAGITIS: ICD-10-CM

## 2024-07-02 LAB
ASCENDING AORTA: 3.44 CM
AV INDEX (PROSTH): 0.82
AV MEAN GRADIENT: 3 MMHG
AV PEAK GRADIENT: 4 MMHG
AV VALVE AREA BY VELOCITY RATIO: 3.15 CM²
AV VALVE AREA: 3.01 CM²
AV VELOCITY RATIO: 0.86
BSA FOR ECHO PROCEDURE: 1.78 M2
CV ECHO LV RWT: 0.29 CM
DOP CALC AO PEAK VEL: 1.01 M/S
DOP CALC AO VTI: 21.26 CM
DOP CALC LVOT AREA: 3.7 CM2
DOP CALC LVOT DIAMETER: 2.16 CM
DOP CALC LVOT PEAK VEL: 0.87 M/S
DOP CALC LVOT STROKE VOLUME: 64.02 CM3
DOP CALCLVOT PEAK VEL VTI: 17.48 CM
E/E' RATIO: 15.43 M/S
ECHO LV POSTERIOR WALL: 0.66 CM (ref 0.6–1.1)
FRACTIONAL SHORTENING: 19 % (ref 28–44)
GLOBAL LONGITUIDAL STRAIN: 13 %
INTERVENTRICULAR SEPTUM: 0.83 CM (ref 0.6–1.1)
IVRT: 114.18 MSEC
LA MAJOR: 4.79 CM
LA MINOR: 4.84 CM
LA WIDTH: 3.7 CM
LEFT ATRIUM SIZE: 3.59 CM
LEFT ATRIUM VOLUME INDEX MOD: 28.1 ML/M2
LEFT ATRIUM VOLUME INDEX: 30.5 ML/M2
LEFT ATRIUM VOLUME MOD: 50 CM3
LEFT ATRIUM VOLUME: 54.36 CM3
LEFT INTERNAL DIMENSION IN SYSTOLE: 3.67 CM (ref 2.1–4)
LEFT VENTRICLE DIASTOLIC VOLUME INDEX: 52.54 ML/M2
LEFT VENTRICLE DIASTOLIC VOLUME: 93.53 ML
LEFT VENTRICLE MASS INDEX: 59 G/M2
LEFT VENTRICLE SYSTOLIC VOLUME INDEX: 32 ML/M2
LEFT VENTRICLE SYSTOLIC VOLUME: 57.04 ML
LEFT VENTRICULAR INTERNAL DIMENSION IN DIASTOLE: 4.52 CM (ref 3.5–6)
LEFT VENTRICULAR MASS: 104.38 G
LV LATERAL E/E' RATIO: 15.43 M/S
LV SEPTAL E/E' RATIO: 15.43 M/S
MV A" WAVE DURATION": 19.7 MSEC
MV PEAK E VEL: 1.08 M/S
OHS LV EJECTION FRACTION SIMPSONS BIPLANE MOD: 43 %
PISA TR MAX VEL: 2.27 M/S
PULM VEIN S/D RATIO: 1.53
PV PEAK D VEL: 0.17 M/S
PV PEAK S VEL: 0.26 M/S
RA MAJOR: 4.49 CM
RA PRESSURE ESTIMATED: 3 MMHG
RA WIDTH: 3.34 CM
RIGHT VENTRICLE DIASTOLIC BASEL DIMENSION: 3.3 CM
RV TB RVSP: 5 MMHG
SINUS: 4.01 CM
STJ: 3.27 CM
TDI LATERAL: 0.07 M/S
TDI SEPTAL: 0.07 M/S
TDI: 0.07 M/S
TR MAX PG: 21 MMHG
TRICUSPID ANNULAR PLANE SYSTOLIC EXCURSION: 2.34 CM
TV REST PULMONARY ARTERY PRESSURE: 24 MMHG
Z-SCORE OF LEFT VENTRICULAR DIMENSION IN END DIASTOLE: -0.85
Z-SCORE OF LEFT VENTRICULAR DIMENSION IN END SYSTOLE: 1.48

## 2024-07-02 PROCEDURE — 93306 TTE W/DOPPLER COMPLETE: CPT | Mod: 26,,, | Performed by: INTERNAL MEDICINE

## 2024-07-02 PROCEDURE — 93356 MYOCRD STRAIN IMG SPCKL TRCK: CPT | Mod: ,,, | Performed by: INTERNAL MEDICINE

## 2024-07-02 PROCEDURE — 93306 TTE W/DOPPLER COMPLETE: CPT

## 2024-07-02 PROCEDURE — 93356 MYOCRD STRAIN IMG SPCKL TRCK: CPT

## 2024-07-02 PROCEDURE — 99999 PR PBB SHADOW E&M-EST. PATIENT-LVL IV: CPT | Mod: PBBFAC,,, | Performed by: INTERNAL MEDICINE

## 2024-07-02 RX ORDER — FLUTICASONE PROPIONATE AND SALMETEROL 250; 50 UG/1; UG/1
1 POWDER RESPIRATORY (INHALATION) 2 TIMES DAILY
Qty: 60 EACH | Refills: 6 | Status: SHIPPED | OUTPATIENT
Start: 2024-07-02 | End: 2025-07-02

## 2024-07-02 NOTE — PROGRESS NOTES
Subjective     Chief Complaint: SOB    History of Present Illness:  Mr. Emmanuel Grant is a 73 y.o. male with hx of DM, CAD, CHF, and GERD who presents to clinic today for evaluation of ILD and SOB.    He has been experiencing significant SOB most of the day. When at home, he always has his oxygen on. He does not wear it outside his house because carrying around the tank is difficult. He has used the duonebs at home twice but does not report any improvement in sx. Although, he went to the ED for SOB on 6/29 and got 3 rounds of duo nebss which he said did improve his SOB.     He has been have unrelenting GERD. He has been taking Pepcid 20 mg daily with little no no relief. He has a reported allergy to pantoprazole. EGD this year had shown a hiatal hernia.     He used to work in [employer] and was around refrigerant and spent time it attics, he believes this may have contributed to his current symptoms. He is now retired. He lives in a mobile home that is around 10 years old. He says there has never been any water damage and does not have any problems or concerns of mold.   Review of Systems   Constitutional:  Negative for chills and fever.   HENT:  Negative for congestion.    Respiratory:  Positive for cough. Negative for sputum production and shortness of breath.    Cardiovascular:  Negative for chest pain.   Gastrointestinal:  Positive for heartburn.   Musculoskeletal:  Negative for myalgias.   Skin:  Positive for itching.   Neurological:  Negative for headaches.       PAST HISTORY:     Past Medical History:   Diagnosis Date    CAD (coronary artery disease)     Sees [facility], h/o stent    Diabetes mellitus     Hypertension     Kidney stone     Stroke     age 62       Past Surgical History:   Procedure Laterality Date    CARDIAC CATHETERIZATION      with stent    COLONOSCOPY N/A 03/27/2024    Procedure: COLONOSCOPY;  Surgeon: Ariela Castro MD;  Location: [facility];  Service: Endoscopy;  Laterality: N/A;     ESOPHAGOGASTRODUODENOSCOPY N/A 03/27/2024    Procedure: EGD (ESOPHAGOGASTRODUODENOSCOPY);  Surgeon: Ariela Castro MD;  Location: [facility];  Service: Endoscopy;  Laterality: N/A;    SHOULDER SURGERY Right        Family History   Problem Relation Name Age of Onset    Cancer Mother      Colon cancer Sister      Esophageal cancer Neg Hx         Social History     Socioeconomic History    Marital status: Single    Number of children: 3   Occupational History    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 Rescue    alpha-D-galactosidase (BEANO ORAL) Take 1 tablet by mouth 2 (two) times a day.    amLODIPine (NORVASC) 10 MG tablet Take 1 tablet (10 mg total) by mouth once daily.    aspirin 81 MG Chew Take 81 mg by mouth once daily.    atorvastatin (LIPITOR) 40 MG tablet Take 1 tablet (40 mg total) by mouth every evening.    blood sugar diagnostic Strp To check BG 1 times daily, to use with insurance preferred meter    blood-glucose meter kit To check BG 1 times daily, to use with insurance preferred meter    carvediloL (COREG) 25 MG tablet Take 1 tablet (25 mg total) by mouth 2 (two) times daily with meals.    cholecalciferol, vitamin D3, (VITAMIN D3) 50 mcg (2,000 unit) Cap capsule 1 capsule.    famotidine (PEPCID) 20 MG tablet TAKE 1 TABLET(20 MG) BY MOUTH TWICE DAILY    fluticasone propionate (FLONASE) 50 mcg/actuation nasal spray 1 spray in each nostril Nasally Once a day    fluticasone propionate (FLONASE) 50 mcg/actuation nasal spray 1 spray (50 mcg total) by Each Nostril  "route once daily.    furosemide (LASIX) 20 MG tablet Take 1 tablet (20 mg total) by mouth once daily.    lancets Misc To check BG 1 times daily, to use with insurance preferred meter    latanoprost 0.005 % ophthalmic solution Place 1 drop into both eyes every evening.    magnesium citrate solution Take half the bottle for constipation as needed.    metFORMIN (GLUCOPHAGE) 1000 MG tablet Take 0.5 tablets (500 mg total) by mouth daily with breakfast.    montelukast (SINGULAIR) 10 mg tablet Take 10 mg by mouth every evening.    peg 400/hypromellose/glycerin (DRY EYE RELIEF OPHT) Apply to eye.    polyethylene glycol (GLYCOLAX) 17 gram PwPk Take by mouth.    sacubitriL-valsartan (ENTRESTO) 24-26 mg per tablet Take 1 tablet by mouth 2 (two) times daily.    senna-docusate 8.6-50 mg (SENNA WITH DOCUSATE SODIUM) 8.6-50 mg per tablet Take 1 tablet by mouth once daily.    sucralfate (CARAFATE) 1 gram tablet Take 1 g by mouth 4 (four) times daily.    tamsulosin (FLOMAX) 0.4 mg Cap Take 1 capsule (0.4 mg total) by mouth once daily.     No current facility-administered medications on file prior to visit.       Review of patient's allergies indicates:   Allergen Reactions    Dapagliflozin      Other reaction(s): Other (See Comments)    Pcn [penicillins]     Linagliptin Other (See Comments)     "it knocked me down", "it almost killed me"    Lisinopril Other (See Comments)     cough    Pantoprazole Hives       OBJECTIVE:     Vital Signs:  Vitals:    07/02/24 0852   BP: 118/82   BP Location: Left arm   Patient Position: Sitting   BP Method: Medium (Manual)   Pulse: 79   SpO2: (!) 92%   Weight: 65.9 kg (145 lb 4.5 oz)   Height: 5' 8" (1.727 m)       Body mass index is 22.09 kg/m².     Physical Exam  Constitutional:       General: He is not in acute distress.     Appearance: He is ill-appearing.   HENT:      Head: Normocephalic and atraumatic.      Mouth/Throat:      Mouth: Mucous membranes are moist.      Pharynx: Oropharynx is clear. "   Eyes:      General: No scleral icterus.  Cardiovascular:      Rate and Rhythm: Normal rate and regular rhythm.      Pulses: Normal pulses.      Heart sounds: Normal heart sounds. No murmur heard.  Pulmonary:      Effort: Pulmonary effort is normal. No respiratory distress.      Breath sounds: Wheezing present.      Comments: Conversational dyspnea present   Abdominal:      General: Abdomen is flat.      Palpations: Abdomen is soft.   Musculoskeletal:      Right lower leg: No edema.      Left lower leg: No edema.   Skin:     General: Skin is warm and dry.      Findings: Rash (dry skin on his back) present.   Neurological:      General: No focal deficit present.      Mental Status: He is alert and oriented to person, place, and time.   Psychiatric:         Mood and Affect: Mood normal.         Behavior: Behavior normal.         Laboratory  No results found for this or any previous visit (from the past 24 hour(s)).    Diagnostic Results:      Health Maintenance Due   Topic Date Due    Foot Exam  Never done    COVID-19 Vaccine (5 - 2023-24 season) 12/07/2023         ASSESSMENT & PLAN:     Mr. Emmanuel Grant is a 73 y.o. male who is being seen for ILD    1. Hypoxia  -     Stress test, pulmonary; Future  - Will try to obtain portable oxygen concentrator    2. Interstitial lung disease  -     fluticasone-salmeterol diskus inhaler 250-50 mcg; Inhale 1 puff into the lungs 2 (two) times daily. Controller  Dispense: 60 each; Refill: 6  -Continue duo nebs prn   -CT chest  -Considered course of prednisone, but with the severe GERD and PPI allergy,  will trial ICS first     3. Gastroesophageal reflux disease without esophagitis  -     Ambulatory referral/consult to Gastroenterology; Future; Expected date: 07/09/2024  -Continue pepcid until you see GI        RTC in 6-8 weeks    Discussed with Dr. Schmitt  - staff attestation to follow      Alec Ferrer DO  Internal Medicine PGY-2  [address] [place] [address]

## 2024-07-02 NOTE — PROCEDURES
Emmanuel Grant is a 73 y.o.  male patient, who presents for a 6 minute walk test ordered by MD Oskar.  The diagnosis is Qualify for Oxygen; Interstitial Lung Disease.  The patient's BMI is 22.1 kg/m2. Predicted distance (lower limit of normal) is 370.28 meters.    Test Results:    The test was completed without stopping.  The total time walked was 360 seconds.  During walking, the patient reported:  Dyspnea.  The patient used supplemental oxygen during testing.     07/02/2024---------Distance: 243.84 meters (800 feet)     O2 Sat % Supplemental Oxygen Heart Rate Blood Pressure Matteo Scale   Pre-exercise  (Resting) 87 % Room Air 98 bpm 123/79 mmHg 4   Pre-exercise  (Resting) 98 % 2 L/M 87 bpm 119/79 mmHg 3   During Exercise 90 % 2 L/M 104 bpm 117/77 mmHg 5-6   Post-exercise   97 % 2 L/M  91 bpm       Recovery Time: 123 seconds    Oxygen Qualification:     O2 Sat % Supplemental Oxygen Heart Rate Blood Pressure Matteo Scale   Pre-exercise  (Resting) 87 % Room Air  98 bpm  123/79 mmHg 4      Performing nurse/tech: Monique PALOMO      PREVIOUS STUDY:   The patient has not had a previous study.      CLINICAL INTERPRETATION:  Six minute walk distance is 243.84 meters (800 feet) with heavy dyspnea.  At rest, there was significant desaturation while breathing room air.  During exercise, there was significant desaturation while breathing supplemental oxygen.  Both blood pressure and heart rate remained stable with walking.  The patient did not report non-pulmonary symptoms during exercise.  Significant exercise impairment is likely due to desaturation and subjective symptoms.  The patient did complete the study, walking 360 seconds of the 360 second test.  The patient may benefit from using supplemental oxygen.  No previous study performed.  Based upon age and body mass index, exercise capacity is less than predicted.   Oxygen saturation did improve while breathing supplemental oxygen.

## 2024-07-03 ENCOUNTER — TELEPHONE (OUTPATIENT)
Dept: PULMONOLOGY | Facility: CLINIC | Age: 74
End: 2024-07-03
Payer: MEDICARE

## 2024-07-03 NOTE — TELEPHONE ENCOUNTER
----- Message from Pieter Bailey sent at 7/2/2024  6:55 PM CDT -----  Type: General Call Back     Name of Caller:Pt  Would the patient rather a call back or a response via [employer]? Call back  Best Call Back Number:[phone]   Additional Information: Pt called he wanted to advise the doctor that what he think made him sick was when he assisted someone with moving a toilet that was extremely dirty. He believes that is what is bothering his breathing

## 2024-07-06 ENCOUNTER — NURSE TRIAGE (OUTPATIENT)
Dept: ADMINISTRATIVE | Facility: CLINIC | Age: 74
End: 2024-07-06
Payer: MEDICARE

## 2024-07-06 ENCOUNTER — HOSPITAL ENCOUNTER (EMERGENCY)
Facility: HOSPITAL | Age: 74
Discharge: HOME | End: 2024-07-06
Attending: EMERGENCY MEDICINE
Payer: MEDICARE

## 2024-07-06 VITALS
OXYGEN SATURATION: 97 % | TEMPERATURE: 98 F | HEIGHT: 68 IN | HEART RATE: 78 BPM | RESPIRATION RATE: 20 BRPM | WEIGHT: 145 LBS | DIASTOLIC BLOOD PRESSURE: 74 MMHG | SYSTOLIC BLOOD PRESSURE: 124 MMHG | BODY MASS INDEX: 21.98 KG/M2

## 2024-07-06 DIAGNOSIS — R07.9 CHEST PAIN: ICD-10-CM

## 2024-07-06 LAB
ALBUMIN SERPL BCP-MCNC: 3.6 G/DL (ref 3.5–5.2)
ALP SERPL-CCNC: 58 U/L (ref 55–135)
ALT SERPL W/O P-5'-P-CCNC: 14 U/L (ref 10–44)
ANION GAP SERPL CALC-SCNC: 9 MMOL/L (ref 8–16)
AST SERPL-CCNC: 15 U/L (ref 10–40)
BASOPHILS # BLD AUTO: 0.03 K/UL (ref 0–0.2)
BASOPHILS NFR BLD: 0.3 % (ref 0–1.9)
BILIRUB SERPL-MCNC: 0.5 MG/DL (ref 0.1–1)
BNP SERPL-MCNC: 72 PG/ML (ref 0–99)
BUN SERPL-MCNC: 18 MG/DL (ref 8–23)
CALCIUM SERPL-MCNC: 10 MG/DL (ref 8.7–10.5)
CHLORIDE SERPL-SCNC: 102 MMOL/L (ref 95–110)
CO2 SERPL-SCNC: 26 MMOL/L (ref 23–29)
CREAT SERPL-MCNC: 1.3 MG/DL (ref 0.5–1.4)
DIFFERENTIAL METHOD BLD: ABNORMAL
EOSINOPHIL # BLD AUTO: 0.5 K/UL (ref 0–0.5)
EOSINOPHIL NFR BLD: 5.4 % (ref 0–8)
ERYTHROCYTE [DISTWIDTH] IN BLOOD BY AUTOMATED COUNT: 12.4 % (ref 11.5–14.5)
EST. GFR  (NO RACE VARIABLE): 59 ML/MIN/1.73 M^2
GLUCOSE SERPL-MCNC: 349 MG/DL (ref 70–110)
HCT VFR BLD AUTO: 38.6 % (ref 40–54)
HGB BLD-MCNC: 13.2 G/DL (ref 14–18)
IMM GRANULOCYTES # BLD AUTO: 0.01 K/UL (ref 0–0.04)
IMM GRANULOCYTES NFR BLD AUTO: 0.1 % (ref 0–0.5)
LYMPHOCYTES # BLD AUTO: 1.5 K/UL (ref 1–4.8)
LYMPHOCYTES NFR BLD: 16.3 % (ref 18–48)
MCH RBC QN AUTO: 27.9 PG (ref 27–31)
MCHC RBC AUTO-ENTMCNC: 34.2 G/DL (ref 32–36)
MCV RBC AUTO: 82 FL (ref 82–98)
MONOCYTES # BLD AUTO: 0.8 K/UL (ref 0.3–1)
MONOCYTES NFR BLD: 8.9 % (ref 4–15)
NEUTROPHILS # BLD AUTO: 6.4 K/UL (ref 1.8–7.7)
NEUTROPHILS NFR BLD: 69 % (ref 38–73)
NRBC BLD-RTO: 0 /100 WBC
PLATELET # BLD AUTO: 207 K/UL (ref 150–450)
PMV BLD AUTO: 10.4 FL (ref 9.2–12.9)
POTASSIUM SERPL-SCNC: 3.4 MMOL/L (ref 3.5–5.1)
PROT SERPL-MCNC: 7.4 G/DL (ref 6–8.4)
RBC # BLD AUTO: 4.73 M/UL (ref 4.6–6.2)
SODIUM SERPL-SCNC: 137 MMOL/L (ref 136–145)
TROPONIN I SERPL DL<=0.01 NG/ML-MCNC: <0.006 NG/ML (ref 0–0.03)
TROPONIN I SERPL DL<=0.01 NG/ML-MCNC: <0.006 NG/ML (ref 0–0.03)
WBC # BLD AUTO: 9.23 K/UL (ref 3.9–12.7)

## 2024-07-06 PROCEDURE — 85025 COMPLETE CBC W/AUTO DIFF WBC: CPT | Performed by: EMERGENCY MEDICINE

## 2024-07-06 PROCEDURE — 93010 ELECTROCARDIOGRAM REPORT: CPT | Mod: ,,, | Performed by: INTERNAL MEDICINE

## 2024-07-06 PROCEDURE — 80053 COMPREHEN METABOLIC PANEL: CPT | Performed by: EMERGENCY MEDICINE

## 2024-07-06 PROCEDURE — 93005 ELECTROCARDIOGRAM TRACING: CPT

## 2024-07-06 PROCEDURE — 84484 ASSAY OF TROPONIN QUANT: CPT | Mod: 91 | Performed by: EMERGENCY MEDICINE

## 2024-07-06 PROCEDURE — 83880 ASSAY OF NATRIURETIC PEPTIDE: CPT | Performed by: EMERGENCY MEDICINE

## 2024-07-06 PROCEDURE — 25000003 PHARM REV CODE 250: Performed by: EMERGENCY MEDICINE

## 2024-07-06 PROCEDURE — 99285 EMERGENCY DEPT VISIT HI MDM: CPT | Mod: 25

## 2024-07-06 RX ORDER — ASPIRIN 325 MG
325 TABLET ORAL
Status: COMPLETED | OUTPATIENT
Start: 2024-07-06 | End: 2024-07-06

## 2024-07-06 RX ORDER — NITROGLYCERIN 0.4 MG/1
0.4 TABLET SUBLINGUAL EVERY 5 MIN PRN
Status: DISCONTINUED | OUTPATIENT
Start: 2024-07-06 | End: 2024-07-06 | Stop reason: HOSPADM

## 2024-07-06 RX ADMIN — ASPIRIN 325 MG ORAL TABLET 325 MG: 325 PILL ORAL at 10:07

## 2024-07-06 NOTE — DISCHARGE INSTRUCTIONS
Patient's FSGs are uncontrolled due to hyperglycemia on current medication regimen.  Last A1c reviewed-   Lab Results   Component Value Date    HGBA1C 5.8 07/03/2023     Most recent fingerstick glucose reviewed-   No results for input(s): POCTGLUCOSE in the last 24 hours.  Current correctional scale  Medium  Maintain anti-hyperglycemic dose as follows-   Antihyperglycemics (From admission, onward)    Start     Stop Route Frequency Ordered    06/30/23 2100  insulin detemir U-100 injection 25 Units         -- SubQ Nightly 06/29/23 1721    06/29/23 1821  insulin aspart U-100 injection 1-10 Units         -- SubQ Before meals & nightly PRN 06/29/23 1721        Hold Oral hypoglycemics while patient is in the hospital.    See media photos  Gen surgery consulted, appreciate assistance  Right foot debridement planned tomorrow  NPO midnight  IV Vanc and Zosyn (6/29/23- )  Blood cultures pending  Levemir 25U qhs, mod SSI (Home insulin 54 units), adjust PRN  Home Sulfadiazine cream 1% cream topical QD  Wound care consulted    6/30: Dr. Mendoza took patient to OR for bilateral foot debridement. Epidermal and dermal necrosis of right 1st, 2nd, and 3rd toes, no amputation required. Maggot were present in between the toes. Postop diagnoses including osteomyelitis, history of left great toe amputation, and T2DM with right diabetic foot ulcer. Continue insulin, Zosyn. Check vanc level on 7/1.  7/1: Day 1 s/p bilateral foot debridement. Verigene blood cx positive for MSSA, blood cx 1/2 grew gram positive cocci (6/30). Continue insulin, Zosyn. Wound care, dressing change pending. OK to DC from surgery standpoint. Recommends wound care f/u and see Dr. Ridley in 1-2 weeks.   7/2: Surgery has signed off. Outpatient follow up. Discontinue Vancomycin due to Cr rising. Adding Clindamycin PO  7/3: Discontinued Clindamycin due to diarrhea.     Take Doxycycline PO  mg  Daily dressing changed  Follow up with wound care    Wear your Oxygen at all times, Especially if you are walking or leaving the house- use your portable oxygen tank.

## 2024-07-06 NOTE — TELEPHONE ENCOUNTER
Pt feels pain and tightening in chest. Rates pain 7/10. Started about an hour ago. SOB, disorientation. Slow speech.    Dispo- advised pt to hang up and call 911. He initially said he would call someone to bring him to ED but reiterated to pt need for more immediate medical attention given all current symptoms and he agreed to call 911.  Reason for Disposition   Difficult to awaken or acting confused (e.g., disoriented, slurred speech)    Additional Information   Negative: SEVERE difficulty breathing (e.g., struggling for each breath, speaks in single words)    Protocols used: Chest Pain-A-AH

## 2024-07-06 NOTE — ED NOTES
Pt resting in bed in no distress. VSS. Pt awaiting second trop lab draw at 1340. Call light within reach. Will continue to monitor

## 2024-07-06 NOTE — ED PROVIDER NOTES
"Encounter Date: 7/6/2024       History     Chief Complaint   Patient presents with    Chest Pain     Chest pain onset this morning with SOB onset 1.5 hours PTA. Pt with cardiac hx and uses 2L home O2. Patient currently without O2 machine in triage. Pt with weakness also.      73-year-old male with history of coronary artery disease with recent stress test, hypertension, diabetes, interstitial fibrosis on chronic home oxygen comes in complaining of chest pain.  Chest pain was localized in the left lower chest.  It occurred while he was at the store.  He did not have oxygen with him.  It lasted for proximally 30 minutes.  No other associated symptoms.      Review of patient's allergies indicates:   Allergen Reactions    Dapagliflozin      Other reaction(s): Other (See Comments)    Pcn [penicillins]     Linagliptin Other (See Comments)     "it knocked me down", "it almost killed me"    Lisinopril Other (See Comments)     cough    Pantoprazole Hives     Past Medical History:   Diagnosis Date    CAD (coronary artery disease)     Sees [facility], h/o stent    Diabetes mellitus     Hypertension     Kidney stone     Stroke     age 62     Past Surgical History:   Procedure Laterality Date    CARDIAC CATHETERIZATION      with stent    COLONOSCOPY N/A 03/27/2024    Procedure: COLONOSCOPY;  Surgeon: Ariela Castro MD;  Location: [facility];  Service: Endoscopy;  Laterality: N/A;    ESOPHAGOGASTRODUODENOSCOPY N/A 03/27/2024    Procedure: EGD (ESOPHAGOGASTRODUODENOSCOPY);  Surgeon: Ariela Castro MD;  Location: [facility];  Service: Endoscopy;  Laterality: N/A;    SHOULDER SURGERY Right      Family History   Problem Relation Name Age of Onset    Cancer Mother      Colon cancer Sister      Esophageal cancer Neg Hx       Social History     Tobacco Use    Smoking status: Never     Passive exposure: Never    Smokeless tobacco: Never   Substance Use Topics    Alcohol use: No    Drug use: Never     Review of " Systems    Physical Exam     Initial Vitals [07/06/24 1030]   BP Pulse Resp Temp SpO2   119/71 79 18 98.4 °F (36.9 °C) 99 %      MAP       --         Physical Exam    Nursing note and vitals reviewed.  Constitutional: He appears well-developed and well-nourished.   HENT:   Head: Atraumatic.   Eyes: EOM are normal. Pupils are equal, round, and reactive to light.   Neck: Neck supple. No JVD present.   Normal range of motion.  Cardiovascular:  Normal rate, regular rhythm, normal heart sounds and intact distal pulses.     Exam reveals no gallop and no friction rub.       No murmur heard.  Pulmonary/Chest: Breath sounds normal.   Abdominal: Abdomen is soft. Bowel sounds are normal. There is no abdominal tenderness.   Musculoskeletal:         General: Normal range of motion.      Cervical back: Normal range of motion and neck supple.     Lymphadenopathy:     He has no cervical adenopathy.   Neurological: He is alert and oriented to person, place, and time. He has normal strength.   Skin: Skin is warm and dry.   Psychiatric: He has a normal mood and affect. Thought content normal.         ED Course   Procedures  Labs Reviewed   CBC W/ AUTO DIFFERENTIAL - Abnormal; Notable for the following components:       Result Value    Hemoglobin 13.2 (*)     Hematocrit 38.6 (*)     Lymph % 16.3 (*)     All other components within normal limits   COMPREHENSIVE METABOLIC PANEL - Abnormal; Notable for the following components:    Potassium 3.4 (*)     Glucose 349 (*)     eGFR 59 (*)     All other components within normal limits   TROPONIN I   TROPONIN I   B-TYPE NATRIURETIC PEPTIDE     EKG Readings: (Independently Interpreted)   Ectopy: No Ectopy. T Waves: Normal.   Normal sinus rhythm rate of 96 old septal infarct no acute abnormalities.       Imaging Results              X-Ray Chest AP Portable (Final result)  Result time 07/06/24 11:53:18      Final result by Marc Rascon Jr., MD (07/06/24 11:53:18)                    Impression:      No acute cardiopulmonary disease      Electronically signed by: Marc Reed Jr  Date:    07/06/2024  Time:    11:53               Narrative:    EXAMINATION:  XR CHEST AP PORTABLE    CLINICAL HISTORY:  Chest Pain;    TECHNIQUE:  Single frontal view of the chest was performed.    COMPARISON:  06/29/2024    FINDINGS:  Cardiomediastinal silhouetteis unchanged.    Coarsened interstitial opacities compatible with fibrosis appears similar.    Pleura, diaphragm, and thoracic cage grossly unremarkable.                                       Medications   nitroGLYCERIN SL tablet 0.4 mg (has no administration in time range)   aspirin tablet 325 mg (325 mg Oral Given 7/6/24 1050)     Medical Decision Making  Amount and/or Complexity of Data Reviewed  Labs: ordered.  Radiology: ordered.    Risk  OTC drugs.  Prescription drug management.                                      Clinical Impression:  Final diagnoses:  [R07.9] Chest pain          ED Disposition Condition    Discharge Stable          ED Prescriptions    None       Follow-up Information       Follow up With Specialties Details Why Contact Info    Payam Vu Jr., NP Internal Medicine   [address] [facility] [id number] [phone]      [facility] - Emergency Dept Emergency Medicine  As needed [address] [facility] [place] [id number] [phone]             Clemente Kidd MD  07/06/24 9798

## 2024-07-07 LAB
OHS QRS DURATION: 92 MS
OHS QTC CALCULATION: 462 MS

## 2024-07-08 ENCOUNTER — HOSPITAL ENCOUNTER (OUTPATIENT)
Dept: RADIOLOGY | Facility: HOSPITAL | Age: 74
Discharge: HOME | End: 2024-07-08
Attending: INTERNAL MEDICINE
Payer: MEDICARE

## 2024-07-08 DIAGNOSIS — J84.9 INTERSTITIAL PULMONARY DISEASE, UNSPECIFIED: ICD-10-CM

## 2024-07-08 PROCEDURE — 71250 CT THORAX DX C-: CPT | Mod: 26,,, | Performed by: RADIOLOGY

## 2024-07-08 PROCEDURE — 71250 CT THORAX DX C-: CPT | Mod: TC

## 2024-07-11 ENCOUNTER — TELEPHONE (OUTPATIENT)
Dept: PULMONOLOGY | Facility: CLINIC | Age: 74
End: 2024-07-11
Payer: MEDICARE

## 2024-07-11 NOTE — TELEPHONE ENCOUNTER
Call was returned to patient in regards to his cough. Patient states he's on oxygen. Patient oxygen level is 92%. Patient is having a hard time talking due to his cough and SOB. I advised patient if he's in any respiratory distress to go to the nearest ER. I informed patient that I will send Dr Schmitt a message to review and advise. Patient verbalized understanding.

## 2024-07-11 NOTE — TELEPHONE ENCOUNTER
----- Message from Jennifer Raines sent at 7/11/2024 12:25 PM CDT -----  Regarding: Pt Advice  Contact: [phone]  Pt calling regarding issues with oxygen. States he is gagging when using oxygen, he has to remove it for it for a bit and then replace. Please call [phone]

## 2024-07-12 ENCOUNTER — TELEPHONE (OUTPATIENT)
Dept: PULMONOLOGY | Facility: CLINIC | Age: 74
End: 2024-07-12
Payer: MEDICARE

## 2024-07-12 NOTE — TELEPHONE ENCOUNTER
----- Message from Martín Ash sent at 7/12/2024 10:50 AM CDT -----  Consult/Advisory    Name Of Caller:Emmanuel       Contact Preference: [phone]    Nature of call: Ptn called regarding a portable oxygen to put over the shoulder please call ptn to assist

## 2024-07-12 NOTE — TELEPHONE ENCOUNTER
Attempted to reach patient in regards to his oxygen order. No answer. Patient oxygen order and 6MWT was faxed to [facility].

## 2024-07-15 ENCOUNTER — HOSPITAL ENCOUNTER (EMERGENCY)
Facility: HOSPITAL | Age: 74
Discharge: HOME | End: 2024-07-15
Attending: EMERGENCY MEDICINE
Payer: MEDICARE

## 2024-07-15 VITALS
OXYGEN SATURATION: 98 % | TEMPERATURE: 99 F | WEIGHT: 140 LBS | SYSTOLIC BLOOD PRESSURE: 141 MMHG | RESPIRATION RATE: 26 BRPM | BODY MASS INDEX: 21.22 KG/M2 | HEIGHT: 68 IN | HEART RATE: 98 BPM | DIASTOLIC BLOOD PRESSURE: 88 MMHG

## 2024-07-15 DIAGNOSIS — R14.0 ABDOMINAL DISTENTION: ICD-10-CM

## 2024-07-15 DIAGNOSIS — R06.02 SOB (SHORTNESS OF BREATH) ON EXERTION: ICD-10-CM

## 2024-07-15 LAB
ALBUMIN SERPL BCP-MCNC: 3.7 G/DL (ref 3.5–5.2)
ALP SERPL-CCNC: 57 U/L (ref 55–135)
ALT SERPL W/O P-5'-P-CCNC: 16 U/L (ref 10–44)
ANION GAP SERPL CALC-SCNC: 15 MMOL/L (ref 8–16)
AST SERPL-CCNC: 17 U/L (ref 10–40)
BASOPHILS # BLD AUTO: 0.02 K/UL (ref 0–0.2)
BASOPHILS NFR BLD: 0.2 % (ref 0–1.9)
BILIRUB SERPL-MCNC: 0.4 MG/DL (ref 0.1–1)
BNP SERPL-MCNC: 51 PG/ML (ref 0–99)
BUN SERPL-MCNC: 22 MG/DL (ref 8–23)
CALCIUM SERPL-MCNC: 10.1 MG/DL (ref 8.7–10.5)
CHLORIDE SERPL-SCNC: 103 MMOL/L (ref 95–110)
CO2 SERPL-SCNC: 22 MMOL/L (ref 23–29)
CREAT SERPL-MCNC: 1.2 MG/DL (ref 0.5–1.4)
CTP QC/QA: YES
DIFFERENTIAL METHOD BLD: ABNORMAL
EOSINOPHIL # BLD AUTO: 0.7 K/UL (ref 0–0.5)
EOSINOPHIL NFR BLD: 8.7 % (ref 0–8)
ERYTHROCYTE [DISTWIDTH] IN BLOOD BY AUTOMATED COUNT: 13 % (ref 11.5–14.5)
EST. GFR  (NO RACE VARIABLE): >60 ML/MIN/1.73 M^2
GLUCOSE SERPL-MCNC: 254 MG/DL (ref 70–110)
HCT VFR BLD AUTO: 39.9 % (ref 40–54)
HGB BLD-MCNC: 13.6 G/DL (ref 14–18)
IMM GRANULOCYTES # BLD AUTO: 0.02 K/UL (ref 0–0.04)
IMM GRANULOCYTES NFR BLD AUTO: 0.2 % (ref 0–0.5)
LIPASE SERPL-CCNC: 54 U/L (ref 4–60)
LYMPHOCYTES # BLD AUTO: 2.2 K/UL (ref 1–4.8)
LYMPHOCYTES NFR BLD: 26 % (ref 18–48)
MCH RBC QN AUTO: 27.9 PG (ref 27–31)
MCHC RBC AUTO-ENTMCNC: 34.1 G/DL (ref 32–36)
MCV RBC AUTO: 82 FL (ref 82–98)
MONOCYTES # BLD AUTO: 0.7 K/UL (ref 0.3–1)
MONOCYTES NFR BLD: 8 % (ref 4–15)
NEUTROPHILS # BLD AUTO: 4.7 K/UL (ref 1.8–7.7)
NEUTROPHILS NFR BLD: 56.9 % (ref 38–73)
NRBC BLD-RTO: 0 /100 WBC
PLATELET # BLD AUTO: 217 K/UL (ref 150–450)
PMV BLD AUTO: 10.4 FL (ref 9.2–12.9)
POTASSIUM SERPL-SCNC: 3.7 MMOL/L (ref 3.5–5.1)
PROT SERPL-MCNC: 7.6 G/DL (ref 6–8.4)
RBC # BLD AUTO: 4.88 M/UL (ref 4.6–6.2)
SARS-COV-2 RDRP RESP QL NAA+PROBE: NEGATIVE
SODIUM SERPL-SCNC: 140 MMOL/L (ref 136–145)
TROPONIN I SERPL DL<=0.01 NG/ML-MCNC: <0.006 NG/ML (ref 0–0.03)
WBC # BLD AUTO: 8.35 K/UL (ref 3.9–12.7)

## 2024-07-15 PROCEDURE — 83880 ASSAY OF NATRIURETIC PEPTIDE: CPT

## 2024-07-15 PROCEDURE — 83690 ASSAY OF LIPASE: CPT

## 2024-07-15 PROCEDURE — 84484 ASSAY OF TROPONIN QUANT: CPT

## 2024-07-15 PROCEDURE — 85025 COMPLETE CBC W/AUTO DIFF WBC: CPT

## 2024-07-15 PROCEDURE — 87635 SARS-COV-2 COVID-19 AMP PRB: CPT

## 2024-07-15 PROCEDURE — 93010 ELECTROCARDIOGRAM REPORT: CPT | Mod: ,,, | Performed by: INTERNAL MEDICINE

## 2024-07-15 PROCEDURE — 99285 EMERGENCY DEPT VISIT HI MDM: CPT | Mod: 25

## 2024-07-15 PROCEDURE — 80053 COMPREHEN METABOLIC PANEL: CPT

## 2024-07-15 PROCEDURE — 93005 ELECTROCARDIOGRAM TRACING: CPT

## 2024-07-15 RX ORDER — POLYETHYLENE GLYCOL 3350 17 G/17G
17 POWDER, FOR SOLUTION ORAL DAILY PRN
Qty: 116 G | Refills: 0 | Status: SHIPPED | OUTPATIENT
Start: 2024-07-15

## 2024-07-15 NOTE — ED PROVIDER NOTES
"Encounter Date: 7/15/2024       History     Chief Complaint   Patient presents with    Shortness of Breath     Pt referred by pulmonologist to ED due to pt's supplementary O2 resulting in abdominal distention. Pt denies cp, n/v/d. Pt normally wear 3 liters oxygen via NC     74yo male with history of hypertension, interstitial lung disease, diabetes presents to the emergency department for evaluation of a abdominal distention with associated shortness of breath and feeling of "gas in his chest", that patient reports occurs when when he uses oxygen via nasal cannula from the oxygen takes that he has recently begun using.  Patient reports that today he was going to have Chinese food and use the oxygen tank with supplemental oxygen while he was driving to the Chinese food restaurant.  Patient states he checked his oxygen prior to getting out of the car and noted that his oxygen level was 97%.  Patient states that he left the oxygen tank in the car because he was concerned that the staff in the [employer] restaurant would have a problem with his oxygen tank.  Patient reports that he walked from the car to the restaurant and when he sat down to eat he began to feel upset stomach, distention and like he did not have an appetite and could not eat anything.  Patient believes that the oxygen from the oxygen tank is not agreeing with him and causing his upset stomach and oxygen distention.  He reports that he contact his pulmonologist office and was told to come to the emergency department for evaluation.  Patient denies fever, current chest pain, nausea or vomiting.  He reports having a bowel movement today, and passing a lot of gas.  He reports that his bowel movement yesterday was hard and that he is taking medications prescribed by his gastroenterologist to help him with bowel movements.         Review of patient's allergies indicates:   Allergen Reactions    Dapagliflozin      Other reaction(s): Other (See Comments)    " "Pcn [penicillins]     Linagliptin Other (See Comments)     "it knocked me down", "it almost killed me"    Lisinopril Other (See Comments)     cough    Pantoprazole Hives     Past Medical History:   Diagnosis Date    CAD (coronary artery disease)     Sees [facility], h/o stent    Diabetes mellitus     Hypertension     Kidney stone     Stroke     age 62     Past Surgical History:   Procedure Laterality Date    CARDIAC CATHETERIZATION      with stent    COLONOSCOPY N/A 03/27/2024    Procedure: COLONOSCOPY;  Surgeon: Ariela Castro MD;  Location: [facility] ENDO;  Service: Endoscopy;  Laterality: N/A;    ESOPHAGOGASTRODUODENOSCOPY N/A 03/27/2024    Procedure: EGD (ESOPHAGOGASTRODUODENOSCOPY);  Surgeon: Ariela Castro MD;  Location: [facility] ENDO;  Service: Endoscopy;  Laterality: N/A;    SHOULDER SURGERY Right      Family History   Problem Relation Name Age of Onset    Cancer Mother      Colon cancer Sister      Esophageal cancer Neg Hx       Social History     Tobacco Use    Smoking status: Never     Passive exposure: Never    Smokeless tobacco: Never   Substance Use Topics    Alcohol use: No    Drug use: Never     Review of Systems   Constitutional:  Negative for fever.   HENT:  Negative for facial swelling and sore throat.    Eyes:  Negative for pain and discharge.   Respiratory:  Positive for cough and shortness of breath. Negative for choking.    Cardiovascular:  Negative for chest pain and leg swelling.   Gastrointestinal:  Positive for abdominal distention. Negative for abdominal pain, nausea and vomiting.   Genitourinary:  Negative for dysuria and frequency.   Musculoskeletal:  Negative for back pain.   Skin:  Negative for rash.   Neurological:  Negative for weakness and numbness.       Physical Exam     Initial Vitals [07/15/24 1734]   BP Pulse Resp Temp SpO2   114/69 103 (!) 21 98.6 °F (37 °C) (!) 93 %      MAP       --         Physical Exam    Nursing note and vitals reviewed.  Constitutional: He is not " diaphoretic. No distress.   HENT:   Head: Normocephalic and atraumatic.   Protecting airway   Eyes: Conjunctivae and EOM are normal. No scleral icterus.   Neck: Neck supple. No tracheal deviation present.   Normal range of motion.  Cardiovascular:  Normal rate, regular rhythm and intact distal pulses.           Pulmonary/Chest: No stridor. He has no wheezes. He has no rhonchi. He has no rales.   Tachypneic   Abdominal: Abdomen is soft. He exhibits no distension and no mass. There is no abdominal tenderness. There is no rebound and no guarding.   Musculoskeletal:         General: No tenderness or edema.      Cervical back: Normal range of motion and neck supple.     Neurological: He is alert. He has normal strength. No cranial nerve deficit or sensory deficit.   Skin: Skin is warm and dry.   Psychiatric: He has a normal mood and affect.         ED Course   Procedures  Labs Reviewed   CBC W/ AUTO DIFFERENTIAL - Abnormal; Notable for the following components:       Result Value    Hemoglobin 13.6 (*)     Hematocrit 39.9 (*)     Eos # 0.7 (*)     Eosinophil % 8.7 (*)     All other components within normal limits   COMPREHENSIVE METABOLIC PANEL - Abnormal; Notable for the following components:    CO2 22 (*)     Glucose 254 (*)     All other components within normal limits   TROPONIN I   B-TYPE NATRIURETIC PEPTIDE   LIPASE   LIPASE   SARS-COV-2 RDRP GENE     EKG Readings: (Independently Interpreted)   Initial Reading: No STEMI. Rhythm: Normal Sinus Rhythm. Heart Rate: 100. Ectopy: No Ectopy. Conduction: Normal. ST Segments: Normal ST Segments. Axis: Normal.   Nonspecific T-wave abnormality       Imaging Results              X-Ray Abdomen Flat And Erect (In process)                      X-Ray Chest 1 View (In process)                      Medications - No data to display  Medical Decision Making  Differential diagnosis includes but not limited to:  COPD, CHF, interstitial lung disease, pneumothorax, esophageal rupture,  mediastinitis, constipation, bowel obstruction    Patient is afebrile and not toxic appearing at time of history and physical.  He is tachypneic off of supplemental oxygen but is maintaining an adequate oxygen saturation.  Patient placed on 2 L nasal cannula with improvement in work of breathing.  EKG is without definite acute ischemic changes.  COVID is negative.  CBC is without leukocytosis or significant anemia.  Chemistry without renal failure or significant electrolyte abnormality.  Patient's chest x-ray is consistent with his history of interstitial lung disease.  Abdominal x-ray flat and erect does not have air-fluid levels to suggest bowel obstruction.  There appears to be some retained stool in the right side of the colon.  BNP, troponin within normal ranges.  On reassessment patient is resting comfortably in stretcher and saturating well with normal respiratory drive on supplemental nasal cannula.  Unclear etiology of patient's symptoms.  I have low clinical suspicion of ACS, CHF or PE as the cause of patient's symptoms.  I have low clinical suspicion of bowel obstruction or an acute surgical abdomen.  Patient is currently they and appears fit for discharge to follow up with his pulmonologist as well as Gastroenterology to monitor and further evaluate his symptoms.  Patient prescribed MiraLax to use as needed should he feel like he is constipated.  Patient given strict return precautions. counseled on supportive care, appropriate medication usage, concerning symptoms for which to return to ER and the importance of follow up. Understanding and agreement with treatment plan was expressed.     Amount and/or Complexity of Data Reviewed  Radiology: ordered.                                      Clinical Impression:  Final diagnoses:  [R06.02] SOB (shortness of breath) on exertion  [R14.0] Abdominal distention          ED Disposition Condition    Discharge Stable          ED Prescriptions       Medication Sig  Dispense Start Date End Date Auth. Provider    polyethylene glycol (GLYCOLAX) 17 gram/dose powder Take 17 g by mouth daily as needed (constipation). 116 g 7/15/2024 -- Jay Walls MD          Follow-up Information       Follow up With Specialties Details Why Contact Info    Naheed Schmitt MD Pulmonary Disease Call in 1 day  [address] [facility] [address]  [phone]               Jay Walls MD  07/1950

## 2024-07-16 ENCOUNTER — TELEPHONE (OUTPATIENT)
Dept: PULMONOLOGY | Facility: CLINIC | Age: 74
End: 2024-07-16
Payer: MEDICARE

## 2024-07-16 NOTE — DISCHARGE INSTRUCTIONS
Emergency department testing is within acceptable ranges.  Exact cause of your symptoms is unclear at this time.  You should continue your medications as you have been prescribed.  You should continue to use oxygen as prescribed by your pulmonologist.  Contact your pulmonologist tomorrow to address whether any adjustments to your oxygen regiment are warranted.  You should also continue medications as needed to prevent constipation.  Use MiraLax as needed if you feel you are constipated and to have a complete bowel movement.  Return to the emergency department for severe abdominal pain, fever, uncontrollable nausea or vomiting, inability to drink oral fluids or any new, worsening or significantly concerning symptoms.    Thank you for coming to our Emergency Department today. It is important to remember that some problems are difficult to diagnose and may not be found during your first visit. Be sure to follow up with your primary care doctor and review any labs/imaging that was performed with them. If you do not have a primary care doctor, you may contact the one listed on your discharge paperwork or you may also call the [facility] Appointment Desk at [phone] to schedule an appointment with one.     All medications may potentially have side effects and it is impossible to predict which medications may give you side effects. If you feel that you are having a negative effect of any medication you should immediately stop taking them and seek medical attention.    Return to the ER with any questions/concerns, new/concerning symptoms, worsening or failure to improve. Do not drive or make any important decisions for 24 hours if you have received any pain medications, sedatives or mood altering drugs during your ER visit.

## 2024-07-16 NOTE — TELEPHONE ENCOUNTER
Late entry,  Call received yesterday at 1632 from phone room who had patient on the line c/o SOB and abd pain and distention from his oxygen. Spoke to patient who sounded like he was struggling to breath. Patient reported that his O2 sats was 82 while he was on the phone with me. He refused to put his oxygen on as he felt like it was making him sick abd causing the stomach pain. Patient advised to go to the ED for evaluation. Patient verbalized understanding.

## 2024-07-16 NOTE — TELEPHONE ENCOUNTER
Call was returned to patient in regards to his SOB. Patient states he went to the ED yesterday. Patient is still having SOB. Patient states he really needs help due to everyone thinking he's lying. Patient sound like he's struggling to breath. I informed patient that I will send Dr Scmhitt a message to review and advised. Patient verbalized understanding.

## 2024-07-16 NOTE — TELEPHONE ENCOUNTER
----- Message from Jennifer Raines sent at 7/16/2024  2:48 PM CDT -----  Regarding: Pt advice  Pt calling stating he went to the ER for Pain in the abdomen. States the oxygen machine is causing pain in abdomen.  Test were normal but is in severe pain. Please call  [phone]

## 2024-07-16 NOTE — ED NOTES
Emmanuel Grant, a 73 y.o. male presents to the ED w/ complaint of SOB and abdominal distention. Patient is AAOx3, VSS, NAD. Denies CP, N/V/D/C, cough, fever, numbness, or tingling. Bed locked, in lowest position, bed rails up x2, call light in reach, all monitoring attached.

## 2024-07-17 ENCOUNTER — TELEPHONE (OUTPATIENT)
Dept: PULMONOLOGY | Facility: CLINIC | Age: 74
End: 2024-07-17
Payer: MEDICARE

## 2024-07-17 NOTE — TELEPHONE ENCOUNTER
I spoke with patient in regards to scheduling an hospital follow up per Dr Schmitt. Patient is scheduled on 7/19/24 at 10:30 am. Patient confirmed and verbalized understanding.

## 2024-07-19 ENCOUNTER — HOSPITAL ENCOUNTER (OUTPATIENT)
Dept: PULMONOLOGY | Facility: CLINIC | Age: 74
Discharge: HOME | End: 2024-07-19
Payer: MEDICARE

## 2024-07-19 ENCOUNTER — OFFICE VISIT (OUTPATIENT)
Dept: PULMONOLOGY | Facility: CLINIC | Age: 74
End: 2024-07-19
Payer: MEDICARE

## 2024-07-19 VITALS
DIASTOLIC BLOOD PRESSURE: 80 MMHG | BODY MASS INDEX: 21.68 KG/M2 | SYSTOLIC BLOOD PRESSURE: 132 MMHG | HEART RATE: 92 BPM | HEIGHT: 68 IN | OXYGEN SATURATION: 93 % | WEIGHT: 143.06 LBS

## 2024-07-19 VITALS — WEIGHT: 143.06 LBS | HEIGHT: 68 IN | BODY MASS INDEX: 21.68 KG/M2

## 2024-07-19 DIAGNOSIS — I50.22 CHRONIC HFREF (HEART FAILURE WITH REDUCED EJECTION FRACTION): ICD-10-CM

## 2024-07-19 DIAGNOSIS — K21.9 GASTROESOPHAGEAL REFLUX DISEASE, UNSPECIFIED WHETHER ESOPHAGITIS PRESENT: ICD-10-CM

## 2024-07-19 DIAGNOSIS — J84.9 INTERSTITIAL LUNG DISEASE: ICD-10-CM

## 2024-07-19 DIAGNOSIS — J84.9 INTERSTITIAL LUNG DISEASE: Primary | ICD-10-CM

## 2024-07-19 DIAGNOSIS — J96.01 ACUTE HYPOXIC RESPIRATORY FAILURE: ICD-10-CM

## 2024-07-19 DIAGNOSIS — R06.02 SOB (SHORTNESS OF BREATH): ICD-10-CM

## 2024-07-19 LAB
OHS QRS DURATION: 96 MS
OHS QTC CALCULATION: 464 MS

## 2024-07-19 PROCEDURE — 3075F SYST BP GE 130 - 139MM HG: CPT | Mod: CPTII,S$GLB,, | Performed by: INTERNAL MEDICINE

## 2024-07-19 PROCEDURE — 3079F DIAST BP 80-89 MM HG: CPT | Mod: CPTII,S$GLB,, | Performed by: INTERNAL MEDICINE

## 2024-07-19 PROCEDURE — 3008F BODY MASS INDEX DOCD: CPT | Mod: CPTII,S$GLB,, | Performed by: INTERNAL MEDICINE

## 2024-07-19 PROCEDURE — 94618 PULMONARY STRESS TESTING: CPT | Mod: S$GLB,,, | Performed by: INTERNAL MEDICINE

## 2024-07-19 PROCEDURE — 3062F POS MACROALBUMINURIA REV: CPT | Mod: CPTII,S$GLB,, | Performed by: INTERNAL MEDICINE

## 2024-07-19 PROCEDURE — 3051F HG A1C>EQUAL 7.0%<8.0%: CPT | Mod: CPTII,S$GLB,, | Performed by: INTERNAL MEDICINE

## 2024-07-19 PROCEDURE — 99214 OFFICE O/P EST MOD 30 MIN: CPT | Mod: 25,S$GLB,, | Performed by: INTERNAL MEDICINE

## 2024-07-19 PROCEDURE — 4010F ACE/ARB THERAPY RXD/TAKEN: CPT | Mod: CPTII,S$GLB,, | Performed by: INTERNAL MEDICINE

## 2024-07-19 PROCEDURE — 1101F PT FALLS ASSESS-DOCD LE1/YR: CPT | Mod: CPTII,S$GLB,, | Performed by: INTERNAL MEDICINE

## 2024-07-19 PROCEDURE — 1159F MED LIST DOCD IN RCRD: CPT | Mod: CPTII,S$GLB,, | Performed by: INTERNAL MEDICINE

## 2024-07-19 PROCEDURE — 99999 PR PBB SHADOW E&M-EST. PATIENT-LVL IV: CPT | Mod: PBBFAC,,, | Performed by: INTERNAL MEDICINE

## 2024-07-19 PROCEDURE — 3288F FALL RISK ASSESSMENT DOCD: CPT | Mod: CPTII,S$GLB,, | Performed by: INTERNAL MEDICINE

## 2024-07-19 PROCEDURE — 3066F NEPHROPATHY DOC TX: CPT | Mod: CPTII,S$GLB,, | Performed by: INTERNAL MEDICINE

## 2024-07-19 RX ORDER — PREDNISONE 20 MG/1
TABLET ORAL
Qty: 65 TABLET | Refills: 0 | Status: SHIPPED | OUTPATIENT
Start: 2024-07-19 | End: 2024-08-28

## 2024-07-19 RX ORDER — OMEPRAZOLE 40 MG/1
40 CAPSULE, DELAYED RELEASE ORAL DAILY
Qty: 90 CAPSULE | Refills: 3 | Status: SHIPPED | OUTPATIENT
Start: 2024-07-19 | End: 2025-07-19

## 2024-07-19 NOTE — ASSESSMENT & PLAN NOTE
Suspect Hypersensitivity pneumonitis although we have been unable to define the trigger. Starting long steroid taper along with prilosec for his severe GERD.   Follow up in 6-8 weeks after steroid taper.

## 2024-07-19 NOTE — ASSESSMENT & PLAN NOTE
Severe GERD and follows with GI.  Has been tried on Protonix in the past but develop hives.  After discussion with pharmacy we will do a trial of Prilosec.  Continue Carafate and his other medication

## 2024-07-19 NOTE — PROGRESS NOTES
"Reason for visit:  ILD    Patient ID:  Emmanuel Grant is a 73 y.o. male    Interval History   Since our last visit he has continued to have significant shortness of breath.  He unfortunately has also had severe acid reflux and stomach pains that are causing him significant discomfort and making his shortness of breath worse.  He has not really noted any improvement since starting inhalers or any of our last treatment.  He does not see GI again until August 10th.  But he is getting progressively worse.     History:  Mr. Grant  is a 73-year-old with newly diagnosed interstitial lung disease he [facility] and was sent home this last time with oxygen.  He had initial autoimmune evaluation that has been negative thus far.  No history of exposure to medications that would cause this that he is aware of, no family history of lung disease.  No previous heart disease.  He does get short of breath with exertion and moving around a good bit but is mostly able to do what he wants to do throughout the day.  He does use a nebulizer he feels that it does seem to help some.    Additional Pulmonary History:  Childhood Illnesses:  none  Occupational:   works in air conditioning in installation and repair  Environmental:   no pets, no seasonal allergies, no carpet in his home and no concern for mold or allergy exposures there  Tobacco/Smoking:   never smoker    Objective:     Vitals:    07/19/24 1005   BP: 132/80   BP Location: Right arm   Patient Position: Sitting   Pulse: 92   SpO2: (!) 93%   Weight: 64.9 kg (143 lb 1.3 oz)   Height: 5' 8" (1.727 m)         Physical Exam  Constitutional:       General: He is not in acute distress.     Appearance: He is not diaphoretic.   HENT:      Head: Normocephalic and atraumatic.      Right Ear: External ear normal.      Left Ear: External ear normal.   Eyes:      Conjunctiva/sclera: Conjunctivae normal.      Pupils: Pupils are equal, " round, and reactive to light.   Neck:      Trachea: No tracheal deviation.   Cardiovascular:      Rate and Rhythm: Normal rate and regular rhythm.      Heart sounds: Normal heart sounds. No murmur heard.  Pulmonary:      Effort: Pulmonary effort is normal. No respiratory distress.      Breath sounds: No stridor. Rales present. No wheezing.   Abdominal:      General: Bowel sounds are normal. There is no distension.      Palpations: Abdomen is soft.      Tenderness: There is no abdominal tenderness.   Musculoskeletal:         General: Normal range of motion.      Cervical back: Normal range of motion and neck supple.   Skin:     General: Skin is warm and dry.      Findings: No erythema.   Neurological:      Mental Status: He is alert and oriented to person, place, and time.      Gait: Gait is intact.   Psychiatric:         Mood and Affect: Mood and affect normal.         Cognition and Memory: Memory normal.         Judgment: Judgment normal.          Personal Diagnostic Review and Interpretation   CT scans reviewed from the last 2 occurrences and do show in her lobular septal thick getting no evidence of clear UIP pattern at this time we will repeat in a few months      Pertinent Studies Reviewed & Interpreted:     Pulmonary Function Tests:   Pending     Echocardiograms:   Results for orders placed during the hospital encounter of 07/02/24    Echo    Interpretation Summary    Left Ventricle: The left ventricle is normal in size. Normal wall thickness. Regional wall motion abnormalities and Global hypokinesis present. See diagram for wall motion findings. There is mildly reduced systolic function with a visually estimated ejection fraction of 40 - 45%. Biplane (2D) method of discs ejection fraction is 43%. Global longitudinal strain is -13.0%.    Right Ventricle: Normal right ventricular cavity size. Wall thickness is normal. Systolic function is normal.    Aortic Valve: The aortic valve is a trileaflet valve. There is  mild aortic valve sclerosis.    Mitral Valve: There is mild bileaflet sclerosis. There is mild mitral annular calcification present.    Pulmonic Valve: There is mild regurgitation.    Pulmonary Artery: The estimated pulmonary artery systolic pressure is 24 mmHg.    IVC/SVC: Normal venous pressure at 3 mmHg.          Assessment & Plan:       Problem List Items Addressed This Visit          Pulmonary    Acute hypoxic respiratory failure    Current Assessment & Plan     Continue oxygen therapy and will get official walk test and resend order.          Interstitial lung disease - Primary    Current Assessment & Plan     Suspect Hypersensitivity pneumonitis although we have been unable to define the trigger. Starting long steroid taper along with prilosec for his severe GERD.   Follow up in 6-8 weeks after steroid taper.          Relevant Orders    Six Minute Walk Test to qualify for Home Oxygen    SOB (shortness of breath)    Current Assessment & Plan     See ILD             Cardiac/Vascular    Chronic HFrEF (heart failure with reduced ejection fraction)    Current Assessment & Plan     His HF is definitely contributing to his SOB and severity of his symptoms.             GI    GERD (gastroesophageal reflux disease)    Current Assessment & Plan     Severe GERD and follows with GI.  Has been tried on Protonix in the past but develop hives.  After discussion with pharmacy we will do a trial of Prilosec.  Continue Carafate and his other medication             RETURN TO CLINIC IN 2 MONTHS       Portions of the record may have been created with voice-recognition software. Occasional wrong-word or sound-a-like substitutions may have occurred due to the inherent limitations of voice-recognition software. Read the chart carefully and recognize, using context, where substitutions have occurred.

## 2024-07-22 ENCOUNTER — TELEPHONE (OUTPATIENT)
Dept: PULMONOLOGY | Facility: CLINIC | Age: 74
End: 2024-07-22
Payer: MEDICARE

## 2024-07-22 NOTE — TELEPHONE ENCOUNTER
----- Message from Jennifer Raines sent at 7/22/2024  4:48 PM CDT -----  Regarding: PT Advice  Contact: [phone]  Pt calling to request portable oxygen machine be ordered. Please call  [phone]

## 2024-07-22 NOTE — TELEPHONE ENCOUNTER
Spoke with pt, informed him that I have received her message. Pt is requesting portable oxygen. I verbalized to pt that I understand and advised pt that I will forward his message to Dr Schmitt to review/advise.

## 2024-07-23 NOTE — PROCEDURES
Emmanuel Grant is a 73 y.o.  male patient, who presents for a 6 minute walk test ordered by MD Oskar.  The diagnosis is Qualify for Oxygen; Interstitial Lung Disease.  The patient's BMI is 21.8 kg/m2. Predicted distance (lower limit of normal) is 371.96 meters.    Test Results:    The test was completed without stopping.  The total time walked was 360 seconds.  During walking, the patient reported:  Chest pain, Dyspnea, Leg pain, Lightheadedness.  The patient used no assistive devices during testing.     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  During exercise, there was significant desaturation while breathing room air.  Blood pressure remained stable and Heart rate increased significantly with walking.  The patient reported non-pulmonary symptoms during exercise.  Significant exercise impairment is likely due to desaturation and subjective symptoms.  The patient did complete the study, walking  360 seconds of the 360 second test.  The patient may benefit from using supplemental oxygen during exertion.  Since the previous study in July 2024, exercise capacity may be somewhat improved.  Based upon age and body mass index, exercise capacity is less than predicted.  Oxygen saturation did improve while breathing supplemental oxygen.

## 2024-07-25 ENCOUNTER — TELEPHONE (OUTPATIENT)
Dept: PULMONOLOGY | Facility: CLINIC | Age: 74
End: 2024-07-25
Payer: MEDICARE

## 2024-07-25 NOTE — TELEPHONE ENCOUNTER
Attempted to reach patient in regards to his oxygen order. No answer. Patient voicemail is not set up.

## 2024-07-25 NOTE — TELEPHONE ENCOUNTER
----- Message from Jennifer Raines sent at 7/25/2024  2:47 PM CDT -----  Regarding: Pt advice  Contact: [phone]  Pt calling for update regarding portable oxygen. Please call [phone]

## 2024-07-29 ENCOUNTER — TELEPHONE (OUTPATIENT)
Dept: PULMONOLOGY | Facility: CLINIC | Age: 74
End: 2024-07-29
Payer: MEDICARE

## 2024-07-29 DIAGNOSIS — I50.22 CHRONIC HFREF (HEART FAILURE WITH REDUCED EJECTION FRACTION): ICD-10-CM

## 2024-07-29 DIAGNOSIS — J84.9 INTERSTITIAL LUNG DISEASE: Primary | ICD-10-CM

## 2024-07-29 NOTE — TELEPHONE ENCOUNTER
----- Message from Marla Godinez sent at 7/29/2024  9:33 AM CDT -----  Regarding: Oxygen  Contact: Pt  [phone]  Pt is calling to state company has no info about oxygen please call

## 2024-07-29 NOTE — TELEPHONE ENCOUNTER
I spoke with Ms James with [facility] in regards to patient oxygen order. Patient need a new order.     A message was sent to Dr Schmitt for a new order.

## 2024-08-05 ENCOUNTER — LAB VISIT (OUTPATIENT)
Dept: LAB | Facility: HOSPITAL | Age: 74
End: 2024-08-05
Attending: INTERNAL MEDICINE
Payer: MEDICARE

## 2024-08-05 ENCOUNTER — TELEPHONE (OUTPATIENT)
Dept: FAMILY MEDICINE | Facility: CLINIC | Age: 74
End: 2024-08-05
Payer: MEDICARE

## 2024-08-05 DIAGNOSIS — E11.65 TYPE 2 DIABETES MELLITUS WITH HYPERGLYCEMIA, UNSPECIFIED WHETHER LONG TERM INSULIN USE: ICD-10-CM

## 2024-08-05 PROBLEM — J96.01 ACUTE HYPOXIC RESPIRATORY FAILURE: Status: RESOLVED | Noted: 2024-02-28 | Resolved: 2024-08-05

## 2024-08-05 PROCEDURE — 36415 COLL VENOUS BLD VENIPUNCTURE: CPT | Mod: PN | Performed by: INTERNAL MEDICINE

## 2024-08-05 PROCEDURE — 83036 HEMOGLOBIN GLYCOSYLATED A1C: CPT | Performed by: INTERNAL MEDICINE

## 2024-08-06 ENCOUNTER — OFFICE VISIT (OUTPATIENT)
Dept: FAMILY MEDICINE | Facility: CLINIC | Age: 74
End: 2024-08-06
Payer: MEDICARE

## 2024-08-06 VITALS
BODY MASS INDEX: 20.89 KG/M2 | SYSTOLIC BLOOD PRESSURE: 102 MMHG | HEIGHT: 68 IN | HEART RATE: 79 BPM | TEMPERATURE: 97 F | WEIGHT: 137.81 LBS | OXYGEN SATURATION: 93 % | DIASTOLIC BLOOD PRESSURE: 58 MMHG

## 2024-08-06 DIAGNOSIS — E78.5 HYPERLIPIDEMIA, UNSPECIFIED HYPERLIPIDEMIA TYPE: ICD-10-CM

## 2024-08-06 DIAGNOSIS — E11.65 TYPE 2 DIABETES MELLITUS WITH HYPERGLYCEMIA, WITHOUT LONG-TERM CURRENT USE OF INSULIN: Primary | ICD-10-CM

## 2024-08-06 DIAGNOSIS — T38.0X5A STEROID-INDUCED HYPERGLYCEMIA: ICD-10-CM

## 2024-08-06 DIAGNOSIS — I25.119 CORONARY ARTERY DISEASE INVOLVING NATIVE CORONARY ARTERY OF NATIVE HEART WITH ANGINA PECTORIS: ICD-10-CM

## 2024-08-06 DIAGNOSIS — I10 ESSENTIAL HYPERTENSION: ICD-10-CM

## 2024-08-06 DIAGNOSIS — I50.22 CHRONIC HFREF (HEART FAILURE WITH REDUCED EJECTION FRACTION): ICD-10-CM

## 2024-08-06 DIAGNOSIS — J96.11 CHRONIC HYPOXEMIC RESPIRATORY FAILURE: ICD-10-CM

## 2024-08-06 DIAGNOSIS — R73.9 STEROID-INDUCED HYPERGLYCEMIA: ICD-10-CM

## 2024-08-06 DIAGNOSIS — J84.9 INTERSTITIAL LUNG DISEASE: ICD-10-CM

## 2024-08-06 LAB
ESTIMATED AVG GLUCOSE: 192 MG/DL (ref 68–131)
HBA1C MFR BLD: 8.3 % (ref 4–5.6)

## 2024-08-06 PROCEDURE — 3288F FALL RISK ASSESSMENT DOCD: CPT | Mod: CPTII,S$GLB,, | Performed by: INTERNAL MEDICINE

## 2024-08-06 PROCEDURE — 3078F DIAST BP <80 MM HG: CPT | Mod: CPTII,S$GLB,, | Performed by: INTERNAL MEDICINE

## 2024-08-06 PROCEDURE — 3008F BODY MASS INDEX DOCD: CPT | Mod: CPTII,S$GLB,, | Performed by: INTERNAL MEDICINE

## 2024-08-06 PROCEDURE — 1126F AMNT PAIN NOTED NONE PRSNT: CPT | Mod: CPTII,S$GLB,, | Performed by: INTERNAL MEDICINE

## 2024-08-06 PROCEDURE — 3052F HG A1C>EQUAL 8.0%<EQUAL 9.0%: CPT | Mod: CPTII,S$GLB,, | Performed by: INTERNAL MEDICINE

## 2024-08-06 PROCEDURE — 4010F ACE/ARB THERAPY RXD/TAKEN: CPT | Mod: CPTII,S$GLB,, | Performed by: INTERNAL MEDICINE

## 2024-08-06 PROCEDURE — 2023F DILAT RTA XM W/O RTNOPTHY: CPT | Mod: CPTII,S$GLB,, | Performed by: INTERNAL MEDICINE

## 2024-08-06 PROCEDURE — 1160F RVW MEDS BY RX/DR IN RCRD: CPT | Mod: CPTII,S$GLB,, | Performed by: INTERNAL MEDICINE

## 2024-08-06 PROCEDURE — 99214 OFFICE O/P EST MOD 30 MIN: CPT | Mod: S$GLB,,, | Performed by: INTERNAL MEDICINE

## 2024-08-06 PROCEDURE — 3062F POS MACROALBUMINURIA REV: CPT | Mod: CPTII,S$GLB,, | Performed by: INTERNAL MEDICINE

## 2024-08-06 PROCEDURE — 3074F SYST BP LT 130 MM HG: CPT | Mod: CPTII,S$GLB,, | Performed by: INTERNAL MEDICINE

## 2024-08-06 PROCEDURE — 1101F PT FALLS ASSESS-DOCD LE1/YR: CPT | Mod: CPTII,S$GLB,, | Performed by: INTERNAL MEDICINE

## 2024-08-06 PROCEDURE — 1159F MED LIST DOCD IN RCRD: CPT | Mod: CPTII,S$GLB,, | Performed by: INTERNAL MEDICINE

## 2024-08-06 PROCEDURE — 99999 PR PBB SHADOW E&M-EST. PATIENT-LVL V: CPT | Mod: PBBFAC,,, | Performed by: INTERNAL MEDICINE

## 2024-08-06 PROCEDURE — 3066F NEPHROPATHY DOC TX: CPT | Mod: CPTII,S$GLB,, | Performed by: INTERNAL MEDICINE

## 2024-08-06 RX ORDER — LANCETS
EACH MISCELLANEOUS
Qty: 200 EACH | Refills: 5 | Status: SHIPPED | OUTPATIENT
Start: 2024-08-06

## 2024-08-06 RX ORDER — METFORMIN HYDROCHLORIDE 1000 MG/1
1000 TABLET ORAL 2 TIMES DAILY WITH MEALS
Start: 2024-08-06

## 2024-08-06 RX ORDER — GLIMEPIRIDE 2 MG/1
2 TABLET ORAL 2 TIMES DAILY
COMMUNITY

## 2024-08-06 RX ORDER — FLASH GLUCOSE SENSOR
KIT MISCELLANEOUS
Qty: 2 KIT | Refills: 5 | Status: SHIPPED | OUTPATIENT
Start: 2024-08-06 | End: 2024-08-07

## 2024-08-07 ENCOUNTER — CLINICAL SUPPORT (OUTPATIENT)
Dept: DIABETES | Facility: CLINIC | Age: 74
End: 2024-08-07
Payer: MEDICARE

## 2024-08-07 ENCOUNTER — OFFICE VISIT (OUTPATIENT)
Dept: ENDOCRINOLOGY | Facility: CLINIC | Age: 74
End: 2024-08-07
Payer: MEDICARE

## 2024-08-07 VITALS
HEART RATE: 64 BPM | TEMPERATURE: 99 F | OXYGEN SATURATION: 97 % | DIASTOLIC BLOOD PRESSURE: 62 MMHG | SYSTOLIC BLOOD PRESSURE: 104 MMHG | BODY MASS INDEX: 20.83 KG/M2 | WEIGHT: 137 LBS

## 2024-08-07 DIAGNOSIS — R73.9 STEROID-INDUCED HYPERGLYCEMIA: ICD-10-CM

## 2024-08-07 DIAGNOSIS — E11.65 TYPE 2 DIABETES MELLITUS WITH HYPERGLYCEMIA, WITHOUT LONG-TERM CURRENT USE OF INSULIN: Primary | ICD-10-CM

## 2024-08-07 DIAGNOSIS — T38.0X5A STEROID-INDUCED HYPERGLYCEMIA: ICD-10-CM

## 2024-08-07 DIAGNOSIS — E11.65 TYPE 2 DIABETES MELLITUS WITH HYPERGLYCEMIA, WITHOUT LONG-TERM CURRENT USE OF INSULIN: ICD-10-CM

## 2024-08-07 DIAGNOSIS — Z86.73 H/O: CVA (CEREBROVASCULAR ACCIDENT): ICD-10-CM

## 2024-08-07 LAB — GLUCOSE SERPL-MCNC: 339 MG/DL (ref 70–110)

## 2024-08-07 PROCEDURE — 2023F DILAT RTA XM W/O RTNOPTHY: CPT | Mod: CPTII,S$GLB,, | Performed by: NURSE PRACTITIONER

## 2024-08-07 PROCEDURE — 3066F NEPHROPATHY DOC TX: CPT | Mod: CPTII,S$GLB,, | Performed by: NURSE PRACTITIONER

## 2024-08-07 PROCEDURE — 1159F MED LIST DOCD IN RCRD: CPT | Mod: CPTII,S$GLB,, | Performed by: NURSE PRACTITIONER

## 2024-08-07 PROCEDURE — G0108 DIAB MANAGE TRN  PER INDIV: HCPCS | Mod: S$GLB,,, | Performed by: HOSPITALIST

## 2024-08-07 PROCEDURE — 3074F SYST BP LT 130 MM HG: CPT | Mod: CPTII,S$GLB,, | Performed by: NURSE PRACTITIONER

## 2024-08-07 PROCEDURE — 3078F DIAST BP <80 MM HG: CPT | Mod: CPTII,S$GLB,, | Performed by: NURSE PRACTITIONER

## 2024-08-07 PROCEDURE — 82962 GLUCOSE BLOOD TEST: CPT | Mod: S$GLB,,, | Performed by: NURSE PRACTITIONER

## 2024-08-07 PROCEDURE — 99215 OFFICE O/P EST HI 40 MIN: CPT | Mod: S$GLB,,, | Performed by: NURSE PRACTITIONER

## 2024-08-07 PROCEDURE — 3008F BODY MASS INDEX DOCD: CPT | Mod: CPTII,S$GLB,, | Performed by: NURSE PRACTITIONER

## 2024-08-07 PROCEDURE — 4010F ACE/ARB THERAPY RXD/TAKEN: CPT | Mod: CPTII,S$GLB,, | Performed by: NURSE PRACTITIONER

## 2024-08-07 PROCEDURE — 1160F RVW MEDS BY RX/DR IN RCRD: CPT | Mod: CPTII,S$GLB,, | Performed by: NURSE PRACTITIONER

## 2024-08-07 PROCEDURE — 99999 PR PBB SHADOW E&M-EST. PATIENT-LVL I: CPT | Mod: PBBFAC,,,

## 2024-08-07 PROCEDURE — 99999 PR PBB SHADOW E&M-EST. PATIENT-LVL V: CPT | Mod: PBBFAC,,, | Performed by: NURSE PRACTITIONER

## 2024-08-07 PROCEDURE — 3062F POS MACROALBUMINURIA REV: CPT | Mod: CPTII,S$GLB,, | Performed by: NURSE PRACTITIONER

## 2024-08-07 PROCEDURE — 3052F HG A1C>EQUAL 8.0%<EQUAL 9.0%: CPT | Mod: CPTII,S$GLB,, | Performed by: NURSE PRACTITIONER

## 2024-08-07 PROCEDURE — G2211 COMPLEX E/M VISIT ADD ON: HCPCS | Mod: S$GLB,,, | Performed by: NURSE PRACTITIONER

## 2024-08-07 RX ORDER — INSULIN LISPRO 100 [IU]/ML
INJECTION, SOLUTION INTRAVENOUS; SUBCUTANEOUS
Qty: 15 ML | Refills: 0 | Status: SHIPPED | OUTPATIENT
Start: 2024-08-07

## 2024-08-07 RX ORDER — BLOOD-GLUCOSE,RECEIVER,CONT
EACH MISCELLANEOUS
Qty: 1 EACH | Refills: 0 | Status: SHIPPED | OUTPATIENT
Start: 2024-08-07

## 2024-08-07 RX ORDER — PEN NEEDLE, DIABETIC 30 GX3/16"
1 NEEDLE, DISPOSABLE MISCELLANEOUS
Qty: 100 EACH | Refills: 1 | Status: SHIPPED | OUTPATIENT
Start: 2024-08-07

## 2024-08-07 RX ORDER — BLOOD-GLUCOSE SENSOR
EACH MISCELLANEOUS
Qty: 2 EACH | Refills: 11 | Status: SHIPPED | OUTPATIENT
Start: 2024-08-07

## 2024-08-21 VITALS — WEIGHT: 136.88 LBS | BODY MASS INDEX: 20.82 KG/M2

## 2024-08-21 NOTE — PROGRESS NOTES
Diabetes Care Specialist Progress Note  Author: Lucille Rodriguez RN, CDE  Date: 8/7/2024    Intake    Program Intake  Reason for Diabetes Program Visit:: Initial Diabetes Assessment  Current diabetes risk level:: moderate  In the last 12 months, have you:: used emergency room services  Was the ER or hospital admission related to diabetes?: Yes  Permission to speak with others about care:: no    Current Diabetes Treatment: Oral Medications, Insulin  Oral Medication Type/Dose: glimepiride (AMARYL) 2 MG tablet,metFORMIN (GLUCOPHAGE) 1000 MG tablet  Method of insulin delivery?: Injections  Injection Type: Pens  Pen Type/Dose: Humalog per s/s as needed for meals    Continuous Glucose Monitoring  Patient has CGM: No    Lab Results   Component Value Date    HGBA1C 8.3 (H) 08/05/2024       Weight: 62.1 kg (136 lb 14.5 oz)       Body mass index is 20.82 kg/m².    Lifestyle Coping Support & Clinical    Lifestyle/Coping/Support  Compared to other people your age, how would you rate your health?: Fair  Does anyone in your family have diabetes or does anyone in your family support you in your diabetes care?: unknown  Learning Barriers:: None  Culture or Religion beliefs that may impact ability to access healthcare: No  Psychosocial/Coping Skills Assessment Completed: : Yes  Assessment indicates:: Adequate understanding  Area of need?: No    Problem Review  Active Comorbidities: Stroke, Hyperlipidemia/Dyslipidemia, Cardiovascular Disease, Other (comment), Gastrointestinal Disorder (Steroid induced hyperglycemia)    Diabetes Self-Management Skills Assessment    Medication Skills Assessment  Patient is able to identify current diabetes medications, dosages, and appropriate timing of medications.: yes  Patient reports problems or concerns with current medication regimen.: no  Patient is  aware that some diabetes medications can cause low blood sugar?: Yes  Medication Skills Assessment Completed:: Yes  Assessment indicates:: Adequate  understanding, Instruction Needed  Area of need?: Yes    Diabetes Disease Process/Treatment Options  Diabetes Type?: Type II  When were you diagnosed?: 30 yrs ago  If previous diabetes education, when/where:: none  What are your goals for this education session?: learn how to give insulin  Is patient aware of what causes diabetes?: Yes  Does patient understand the pathophysiology of diabetes?: Yes  Diabetes Disease Process/Treatment Options: Skills Assessment Completed: Yes  Assessment indicates:: Adequate understanding  Area of need?: No    Nutrition/Healthy Eating  Meal Plan 24 Hour Recall - Breakfast: breakfast was 2 eggs, oatmeal, carrot and curcuma juice  Meal Plan 24 Hour Recall - Lunch: whole wheat spaghetti with meatballs and manju sauce  Meal Plan 24 Hour Recall - Dinner: red beans and rice, carrot juice  Meal Plan 24 Hour Recall - Snack: fruit  Meal Plan 24 Hour Recall - Beverage: water and coconut water  Who shops/cooks?: patient and family  Patient can identify foods that impact blood sugar.: no (see comments)  Challenges to healthy eating:: portion control  Nutrition/Healthy Eating Skills Assessment Completed:: Yes  Assessment indicates:: Knowledge deficit  Area of need?: Yes    Physical Activity/Exercise  Patient's daily activity level:: sedentary  Patient formally exercises outside of work.: no  Reasons for not exercising:: physically unable to exercise currently  Physical Activity/Exercise Skills Assessment Completed: : Yes  Assessment indicates:: Adequate understanding  Area of need?: No    Home Blood Glucose Monitoring  Patient states that blood sugar is checked at home daily.: no  Home Blood Glucose Monitoring Skills Assessment Completed: : Yes  Assessment indicates:: Instruction Needed  Area of need?: Yes    Acute Complications  Have you ever had hypoglycemia (low BG 70 or less)?: yes  Have you ever had hyperglycemia (high  or more)?: no  Acute Complications Skills Assessment Completed: :  Yes  Assessment indicates:: Knowledge deficit  Area of need?: Yes    Chronic Complications  Chronic Complications Skills Assessment Completed: : No  Deferred due to:: Time      Assessment Summary and Plan    Based on today's diabetes care assessment, the following areas of need were identified:          8/7/2024    12:03 AM   Areas of Need   Medications/Current Diabetes Treatment Yes, see care planning   Lifestyle Coping Support No   Diabetes Disease Process/Treatment Options No   Nutrition/Healthy Eating Yes, Advised to limit the amt of carbs in diet at this time due to elevations in bg as a result of prednisone therapy. The plate method for carb counting was discussed.  Stressed importance of eating 3 balanced meals per day and reasonable snacks if needed.    Physical Activity/Exercise No   Home Blood Glucose Monitoring Yes, see care planning   Acute Complications Yes, Reviewed blood glucose goals, prevention, detection, signs and symptoms, and treatment of hypoglycemia following rule of 15 and hyperglycemia, and when to contact the clinic. Advised to carry a source of fast acting carbs.        Today's interventions were provided through individual discussion, instruction, and written materials were provided.      Patient verbalized understanding of instruction and written materials.  Pt was able to return back demonstration of instructions today. Patient understood key points, needs reinforcement and further instruction.     Diabetes Self-Management Care Plan:    Today's Diabetes Self-Management Care Plan was developed with Emmanuel's input. Emmanuel has agreed to work toward the following goal(s) to improve his/her overall diabetes control.      Care Plan: Diabetes Management   Updates made since 7/22/2024 12:00 AM        Problem: Medications         Goal: Patient Agrees to take Diabetes Medication(s) Humalog per sliding scale as prescribed.    Start Date: 8/7/2024   Expected End Date: 11/15/2024   Priority: High    Note:    Patient instructed how to use Humalog insulin pen to give injections per sliding scale.  MOA of insulin explained. Reviewed need for rotation of injection sites, appropriate insulin storage, safe disposal of used sharps and insulin pen preparation and use. Instructed how to use the sliding scale and when to give insulin. Performed adequate return demonstration with demo pen.        Task: Reviewed with patient all current diabetes medications and provided basic review of the purpose, dosage, frequency, side effects, and storage of both oral and injectable diabetes medications. Completed 8/7/2024         Task: Discussed guidelines for preventing, detecting and treating hypoglycemia and hyperglycemia and reviewed the importance of meal and medication timing with diabetes mediations for prevention of hypoglycemia and maximum drug benefit. Completed 8/7/2024        Problem: Blood Glucose Self-Monitoring         Goal: Patient agrees to check and record blood sugars using the Freestyle Hola system    Start Date: 8/7/2024   Expected End Date: 10/15/2024   Priority: Medium   Note:    Patient was prescribed a Hola system, but had not picked it up from pharmacy.  Demonstrated how to insert sensor and aubrey was set up on his phone.      Follow Up Plan     Has f/u with SAMIR Euceda on 11/15/24.  Will set up f/u ed visit for educator on same day.  Plan: review MNT, Hola, insulin    Today's care plan and follow up schedule was discussed with patient.  Emmanuel verbalized understanding of the care plan, goals, and agrees to follow up plan.        The patient was encouraged to communicate with his/her health care provider/physician and care team regarding his/her condition(s) and treatment.  I provided the patient with my contact information today and encouraged to contact me via phone or [facility] Patient Portal as needed.     Length of Visit   Total Time: 60 Minutes

## 2024-09-10 ENCOUNTER — HOSPITAL ENCOUNTER (OUTPATIENT)
Facility: HOSPITAL | Age: 74
Discharge: HOME | End: 2024-09-10
Attending: EMERGENCY MEDICINE | Admitting: HOSPITALIST
Payer: MEDICARE

## 2024-09-10 VITALS
HEART RATE: 80 BPM | WEIGHT: 138 LBS | RESPIRATION RATE: 20 BRPM | SYSTOLIC BLOOD PRESSURE: 134 MMHG | OXYGEN SATURATION: 95 % | BODY MASS INDEX: 20.92 KG/M2 | TEMPERATURE: 98 F | DIASTOLIC BLOOD PRESSURE: 87 MMHG | HEIGHT: 68 IN

## 2024-09-10 DIAGNOSIS — I20.0 UNSTABLE ANGINA: Primary | ICD-10-CM

## 2024-09-10 DIAGNOSIS — R07.9 CHEST PAIN: ICD-10-CM

## 2024-09-10 LAB
ALBUMIN SERPL BCP-MCNC: 3.4 G/DL (ref 3.5–5.2)
ALP SERPL-CCNC: 53 U/L (ref 55–135)
ALT SERPL W/O P-5'-P-CCNC: 18 U/L (ref 10–44)
ANION GAP SERPL CALC-SCNC: 12 MMOL/L (ref 8–16)
AST SERPL-CCNC: 21 U/L (ref 10–40)
BASOPHILS # BLD AUTO: 0.02 K/UL (ref 0–0.2)
BASOPHILS NFR BLD: 0.3 % (ref 0–1.9)
BILIRUB SERPL-MCNC: 0.6 MG/DL (ref 0.1–1)
BNP SERPL-MCNC: 84 PG/ML (ref 0–99)
BUN SERPL-MCNC: 9 MG/DL (ref 8–23)
CALCIUM SERPL-MCNC: 9.1 MG/DL (ref 8.7–10.5)
CHLORIDE SERPL-SCNC: 99 MMOL/L (ref 95–110)
CO2 SERPL-SCNC: 27 MMOL/L (ref 23–29)
CREAT SERPL-MCNC: 0.9 MG/DL (ref 0.5–1.4)
DIFFERENTIAL METHOD BLD: ABNORMAL
EOSINOPHIL # BLD AUTO: 0.4 K/UL (ref 0–0.5)
EOSINOPHIL NFR BLD: 5.3 % (ref 0–8)
ERYTHROCYTE [DISTWIDTH] IN BLOOD BY AUTOMATED COUNT: 13.3 % (ref 11.5–14.5)
EST. GFR  (NO RACE VARIABLE): >60 ML/MIN/1.73 M^2
GLUCOSE SERPL-MCNC: 183 MG/DL (ref 70–110)
HCT VFR BLD AUTO: 40.3 % (ref 40–54)
HGB BLD-MCNC: 14.2 G/DL (ref 14–18)
IMM GRANULOCYTES # BLD AUTO: 0.02 K/UL (ref 0–0.04)
IMM GRANULOCYTES NFR BLD AUTO: 0.3 % (ref 0–0.5)
LACTATE SERPL-SCNC: 2.4 MMOL/L (ref 0.5–2.2)
LYMPHOCYTES # BLD AUTO: 1.5 K/UL (ref 1–4.8)
LYMPHOCYTES NFR BLD: 19.4 % (ref 18–48)
MCH RBC QN AUTO: 28.3 PG (ref 27–31)
MCHC RBC AUTO-ENTMCNC: 35.2 G/DL (ref 32–36)
MCV RBC AUTO: 80 FL (ref 82–98)
MONOCYTES # BLD AUTO: 0.6 K/UL (ref 0.3–1)
MONOCYTES NFR BLD: 7.6 % (ref 4–15)
NEUTROPHILS # BLD AUTO: 5 K/UL (ref 1.8–7.7)
NEUTROPHILS NFR BLD: 67.1 % (ref 38–73)
NRBC BLD-RTO: 0 /100 WBC
PLATELET # BLD AUTO: 208 K/UL (ref 150–450)
PMV BLD AUTO: 10.3 FL (ref 9.2–12.9)
POCT GLUCOSE: 176 MG/DL (ref 70–110)
POTASSIUM SERPL-SCNC: 3.1 MMOL/L (ref 3.5–5.1)
PROT SERPL-MCNC: 7.1 G/DL (ref 6–8.4)
RBC # BLD AUTO: 5.01 M/UL (ref 4.6–6.2)
SODIUM SERPL-SCNC: 138 MMOL/L (ref 136–145)
TROPONIN I SERPL DL<=0.01 NG/ML-MCNC: 0.01 NG/ML (ref 0–0.03)
TROPONIN I SERPL DL<=0.01 NG/ML-MCNC: <0.006 NG/ML (ref 0–0.03)
WBC # BLD AUTO: 7.49 K/UL (ref 3.9–12.7)

## 2024-09-10 PROCEDURE — 84484 ASSAY OF TROPONIN QUANT: CPT | Mod: 91 | Performed by: PHYSICIAN ASSISTANT

## 2024-09-10 PROCEDURE — 83880 ASSAY OF NATRIURETIC PEPTIDE: CPT | Performed by: PHYSICIAN ASSISTANT

## 2024-09-10 PROCEDURE — 83605 ASSAY OF LACTIC ACID: CPT

## 2024-09-10 PROCEDURE — 80053 COMPREHEN METABOLIC PANEL: CPT | Performed by: PHYSICIAN ASSISTANT

## 2024-09-10 PROCEDURE — 25000003 PHARM REV CODE 250: Performed by: INTERNAL MEDICINE

## 2024-09-10 PROCEDURE — C1894 INTRO/SHEATH, NON-LASER: HCPCS | Performed by: INTERNAL MEDICINE

## 2024-09-10 PROCEDURE — 93005 ELECTROCARDIOGRAM TRACING: CPT

## 2024-09-10 PROCEDURE — 93010 ELECTROCARDIOGRAM REPORT: CPT | Mod: ,,, | Performed by: INTERNAL MEDICINE

## 2024-09-10 PROCEDURE — C1769 GUIDE WIRE: HCPCS | Performed by: INTERNAL MEDICINE

## 2024-09-10 PROCEDURE — 93458 L HRT ARTERY/VENTRICLE ANGIO: CPT | Performed by: INTERNAL MEDICINE

## 2024-09-10 PROCEDURE — 63600175 PHARM REV CODE 636 W HCPCS: Performed by: INTERNAL MEDICINE

## 2024-09-10 PROCEDURE — C1887 CATHETER, GUIDING: HCPCS | Performed by: INTERNAL MEDICINE

## 2024-09-10 PROCEDURE — 25000003 PHARM REV CODE 250: Performed by: HOSPITALIST

## 2024-09-10 PROCEDURE — 25000003 PHARM REV CODE 250

## 2024-09-10 PROCEDURE — 25500020 PHARM REV CODE 255: Performed by: INTERNAL MEDICINE

## 2024-09-10 PROCEDURE — 63600175 PHARM REV CODE 636 W HCPCS

## 2024-09-10 PROCEDURE — C1760 CLOSURE DEV, VASC: HCPCS | Performed by: INTERNAL MEDICINE

## 2024-09-10 PROCEDURE — 85025 COMPLETE CBC W/AUTO DIFF WBC: CPT | Performed by: PHYSICIAN ASSISTANT

## 2024-09-10 DEVICE — ANGIO-SEAL VIP VASCULAR CLOSURE DEVICE
Type: IMPLANTABLE DEVICE | Site: GROIN | Status: FUNCTIONAL
Brand: ANGIO-SEAL

## 2024-09-10 RX ORDER — POTASSIUM CHLORIDE 20 MEQ/1
40 TABLET, EXTENDED RELEASE ORAL ONCE
Status: COMPLETED | OUTPATIENT
Start: 2024-09-10 | End: 2024-09-10

## 2024-09-10 RX ORDER — ONDANSETRON HYDROCHLORIDE 2 MG/ML
4 INJECTION, SOLUTION INTRAVENOUS
Status: COMPLETED | OUTPATIENT
Start: 2024-09-10 | End: 2024-09-10

## 2024-09-10 RX ORDER — SODIUM CHLORIDE 9 MG/ML
INJECTION, SOLUTION INTRAVENOUS CONTINUOUS
Status: ACTIVE | OUTPATIENT
Start: 2024-09-10 | End: 2024-09-10

## 2024-09-10 RX ORDER — MORPHINE SULFATE 4 MG/ML
2 INJECTION, SOLUTION INTRAMUSCULAR; INTRAVENOUS
Status: COMPLETED | OUTPATIENT
Start: 2024-09-10 | End: 2024-09-10

## 2024-09-10 RX ORDER — NITROGLYCERIN 0.4 MG/1
0.4 TABLET SUBLINGUAL EVERY 5 MIN PRN
Status: DISCONTINUED | OUTPATIENT
Start: 2024-09-10 | End: 2024-09-10 | Stop reason: HOSPADM

## 2024-09-10 RX ORDER — NITROGLYCERIN 0.4 MG/1
0.4 TABLET SUBLINGUAL EVERY 5 MIN PRN
Status: DISCONTINUED | OUTPATIENT
Start: 2024-09-10 | End: 2024-09-10

## 2024-09-10 RX ORDER — ONDANSETRON 4 MG/1
8 TABLET, ORALLY DISINTEGRATING ORAL EVERY 8 HOURS PRN
Status: DISCONTINUED | OUTPATIENT
Start: 2024-09-10 | End: 2024-09-10 | Stop reason: HOSPADM

## 2024-09-10 RX ORDER — ACETAMINOPHEN 325 MG/1
650 TABLET ORAL EVERY 4 HOURS PRN
Status: DISCONTINUED | OUTPATIENT
Start: 2024-09-10 | End: 2024-09-10 | Stop reason: HOSPADM

## 2024-09-10 RX ORDER — LIDOCAINE HYDROCHLORIDE 10 MG/ML
INJECTION, SOLUTION EPIDURAL; INFILTRATION; INTRACAUDAL; PERINEURAL
Status: DISCONTINUED | OUTPATIENT
Start: 2024-09-10 | End: 2024-09-10 | Stop reason: HOSPADM

## 2024-09-10 RX ORDER — ALUMINUM HYDROXIDE, MAGNESIUM HYDROXIDE, AND SIMETHICONE 1200; 120; 1200 MG/30ML; MG/30ML; MG/30ML
30 SUSPENSION ORAL
Status: DISCONTINUED | OUTPATIENT
Start: 2024-09-10 | End: 2024-09-10 | Stop reason: HOSPADM

## 2024-09-10 RX ORDER — TAMSULOSIN HYDROCHLORIDE 0.4 MG/1
0.4 CAPSULE ORAL DAILY
Status: DISCONTINUED | OUTPATIENT
Start: 2024-09-10 | End: 2024-09-10 | Stop reason: HOSPADM

## 2024-09-10 RX ORDER — AMLODIPINE BESYLATE 5 MG/1
10 TABLET ORAL DAILY
Status: DISCONTINUED | OUTPATIENT
Start: 2024-09-10 | End: 2024-09-10 | Stop reason: HOSPADM

## 2024-09-10 RX ORDER — INSULIN ASPART 100 [IU]/ML
0-10 INJECTION, SOLUTION INTRAVENOUS; SUBCUTANEOUS
Status: DISCONTINUED | OUTPATIENT
Start: 2024-09-10 | End: 2024-09-10 | Stop reason: HOSPADM

## 2024-09-10 RX ORDER — HYDROCODONE BITARTRATE AND ACETAMINOPHEN 5; 325 MG/1; MG/1
1 TABLET ORAL EVERY 4 HOURS PRN
Status: DISCONTINUED | OUTPATIENT
Start: 2024-09-10 | End: 2024-09-10 | Stop reason: HOSPADM

## 2024-09-10 RX ORDER — ATORVASTATIN CALCIUM 40 MG/1
40 TABLET, FILM COATED ORAL DAILY
Status: DISCONTINUED | OUTPATIENT
Start: 2024-09-10 | End: 2024-09-10 | Stop reason: HOSPADM

## 2024-09-10 RX ORDER — IBUPROFEN 200 MG
16 TABLET ORAL
Status: DISCONTINUED | OUTPATIENT
Start: 2024-09-10 | End: 2024-09-10 | Stop reason: HOSPADM

## 2024-09-10 RX ORDER — MONTELUKAST SODIUM 10 MG/1
10 TABLET ORAL DAILY
Status: DISCONTINUED | OUTPATIENT
Start: 2024-09-10 | End: 2024-09-10 | Stop reason: HOSPADM

## 2024-09-10 RX ORDER — ASPIRIN 325 MG
325 TABLET ORAL
Status: DISCONTINUED | OUTPATIENT
Start: 2024-09-10 | End: 2024-09-10 | Stop reason: HOSPADM

## 2024-09-10 RX ORDER — FENTANYL CITRATE 50 UG/ML
INJECTION, SOLUTION INTRAMUSCULAR; INTRAVENOUS
Status: DISCONTINUED | OUTPATIENT
Start: 2024-09-10 | End: 2024-09-10 | Stop reason: HOSPADM

## 2024-09-10 RX ORDER — SUCRALFATE 1 G/10ML
1 SUSPENSION ORAL EVERY 6 HOURS
Status: DISCONTINUED | OUTPATIENT
Start: 2024-09-10 | End: 2024-09-10 | Stop reason: HOSPADM

## 2024-09-10 RX ORDER — GLUCAGON 1 MG
1 KIT INJECTION
Status: DISCONTINUED | OUTPATIENT
Start: 2024-09-10 | End: 2024-09-10 | Stop reason: HOSPADM

## 2024-09-10 RX ORDER — SODIUM CHLORIDE 9 MG/ML
INJECTION, SOLUTION INTRAVENOUS CONTINUOUS
OUTPATIENT
Start: 2024-09-10 | End: 2024-09-10

## 2024-09-10 RX ORDER — IBUPROFEN 200 MG
24 TABLET ORAL
Status: DISCONTINUED | OUTPATIENT
Start: 2024-09-10 | End: 2024-09-10 | Stop reason: HOSPADM

## 2024-09-10 RX ADMIN — HYDROCODONE BITARTRATE AND ACETAMINOPHEN 1 TABLET: 5; 325 TABLET ORAL at 04:09

## 2024-09-10 RX ADMIN — ALUMINUM HYDROXIDE, MAGNESIUM HYDROXIDE, AND SIMETHICONE 30 ML: 1200; 120; 1200 SUSPENSION ORAL at 11:09

## 2024-09-10 RX ADMIN — SODIUM CHLORIDE: 9 INJECTION, SOLUTION INTRAVENOUS at 01:09

## 2024-09-10 RX ADMIN — POTASSIUM CHLORIDE 40 MEQ: 1500 TABLET, EXTENDED RELEASE ORAL at 04:09

## 2024-09-10 RX ADMIN — ONDANSETRON 4 MG: 2 INJECTION INTRAMUSCULAR; INTRAVENOUS at 11:09

## 2024-09-10 RX ADMIN — AMLODIPINE BESYLATE 10 MG: 5 TABLET ORAL at 04:09

## 2024-09-10 RX ADMIN — SODIUM CHLORIDE 500 ML: 9 INJECTION, SOLUTION INTRAVENOUS at 10:09

## 2024-09-10 RX ADMIN — MORPHINE SULFATE 2 MG: 4 INJECTION, SOLUTION INTRAMUSCULAR; INTRAVENOUS at 11:09

## 2024-09-10 RX ADMIN — MORPHINE SULFATE 2 MG: 4 INJECTION, SOLUTION INTRAMUSCULAR; INTRAVENOUS at 10:09

## 2024-09-10 RX ADMIN — SUCRALFATE 1 G: 1 SUSPENSION ORAL at 11:09

## 2024-09-10 NOTE — ED PROVIDER NOTES
"Encounter Date: 9/10/2024       History     Chief Complaint   Patient presents with    Chest Pain     Pt reports to ED for chest pain starting this morning, feels like a weight on middle of chest per pt. Hx of hypertension.       HPI  73-year-old male, hyperlipidemia, diabetes, no previous cardiac catheterization presents for 2-1/2 hours of chest tightness and pain.  Patient states pain is nonpositional, is exertional.  Patient does not know of a history of acute coronary syndrome.  No diagnosis of angina pectoralis.  Patient denies back pain.    Patient denies nausea at this time.  No vomiting.  Patient denies shortness of breath.            Review of patient's allergies indicates:   Allergen Reactions    Dapagliflozin      Other reaction(s): Other (See Comments)    Pcn [penicillins]     Linagliptin Other (See Comments)     "it knocked me down", "it almost killed me"    Lisinopril Other (See Comments)     cough    Pantoprazole Hives     Past Medical History:   Diagnosis Date    CAD (coronary artery disease)     Sees [facility], h/o stent    Diabetes mellitus     Hypertension     Kidney stone     Stroke     age 62     Past Surgical History:   Procedure Laterality Date    CARDIAC CATHETERIZATION      with stent    COLONOSCOPY N/A 03/27/2024    Procedure: COLONOSCOPY;  Surgeon: Ariela Castro MD;  Location: [facility];  Service: Endoscopy;  Laterality: N/A;    ESOPHAGOGASTRODUODENOSCOPY N/A 03/27/2024    Procedure: EGD (ESOPHAGOGASTRODUODENOSCOPY);  Surgeon: Ariela Castro MD;  Location: [facility];  Service: Endoscopy;  Laterality: N/A;    SHOULDER SURGERY Right      Family History   Problem Relation Name Age of Onset    Cancer Mother      Colon cancer Sister      Esophageal cancer Neg Hx       Social History     Tobacco Use    Smoking status: Never     Passive exposure: Never    Smokeless tobacco: Never   Substance Use Topics    Alcohol use: No    Drug use: Never     Review of Systems    Physical Exam "     Initial Vitals [09/10/24 0806]   BP Pulse Resp Temp SpO2   134/83 83 18 97.3 °F (36.3 °C) (!) 94 %      MAP       --         Physical Exam    Nursing note and vitals reviewed.  Constitutional: He appears well-developed and well-nourished.   HENT:   Head: Normocephalic and atraumatic.   Eyes: EOM are normal. No scleral icterus.   Neck: Neck supple. No tracheal deviation present. No JVD present.   Normal range of motion.  Cardiovascular:  Normal rate, regular rhythm, normal heart sounds and intact distal pulses.     Exam reveals no gallop and no friction rub.       No murmur heard.  Pulmonary/Chest: Breath sounds normal. No stridor. No respiratory distress. He has no wheezes. He has no rales. He exhibits no tenderness.   Abdominal: Abdomen is soft. He exhibits no distension. There is no abdominal tenderness. There is no rebound.   Musculoskeletal:      Cervical back: Normal range of motion and neck supple.     Neurological: He is alert and oriented to person, place, and time.   Skin: Skin is warm. Capillary refill takes less than 2 seconds.   Psychiatric: He has a normal mood and affect. His behavior is normal. Judgment and thought content normal.         ED Course   Procedures  Labs Reviewed   CBC W/ AUTO DIFFERENTIAL - Abnormal       Result Value    WBC 7.49      RBC 5.01      Hemoglobin 14.2      Hematocrit 40.3      MCV 80 (*)     MCH 28.3      MCHC 35.2      RDW 13.3      Platelets 208      MPV 10.3      Immature Granulocytes 0.3      Gran # (ANC) 5.0      Immature Grans (Abs) 0.02      Lymph # 1.5      Mono # 0.6      Eos # 0.4      Baso # 0.02      nRBC 0      Gran % 67.1      Lymph % 19.4      Mono % 7.6      Eosinophil % 5.3      Basophil % 0.3      Differential Method Automated     COMPREHENSIVE METABOLIC PANEL - Abnormal    Sodium 138      Potassium 3.1 (*)     Chloride 99      CO2 27      Glucose 183 (*)     BUN 9      Creatinine 0.9      Calcium 9.1      Total Protein 7.1      Albumin 3.4 (*)      Total Bilirubin 0.6      Alkaline Phosphatase 53 (*)     AST 21      ALT 18      eGFR >60      Anion Gap 12     LACTIC ACID, PLASMA - Abnormal    Lactate (Lactic Acid) 2.4 (*)    TROPONIN I    Troponin I <0.006     B-TYPE NATRIURETIC PEPTIDE    BNP 84       EKG Readings: (Independently Interpreted)   Initial Reading: No STEMI. Previous EKG: Compared with most recent EKG Previous EKG Date: 7/15/24.   EKG independently interpreted no STEMI regular rate rhythm left axis deviation no T-wave inversion.  This is doctor Nelson dictating an I independently interpreted this EKG.  The axis is normal.  Intervals are normal.  There is no definite evidence of acute myocardial infarction or malignant arrhythmia.       Imaging Results              X-Ray Chest AP Portable (Final result)  Result time 09/10/24 08:50:36      Final result by Marcelo Dobbs III, MD (09/10/24 08:50:36)                   Impression:      Chronic interstitial lung disease with honeycomb lung formation.      Electronically signed by: Marcelo Dobbs MD  Date:    09/10/2024  Time:    08:50               Narrative:    EXAMINATION:  XR CHEST AP PORTABLE    CLINICAL HISTORY:  Chest Pain;    FINDINGS:  Chest one view AP portable.    Again seen is diminished lung volumes with diffuse honeycomb lung formation and late idiopathic pulmonary fibrosis.  No acute process seen.  Heart size is normal.  Bones show nothing focal.                                       Medications   nitroGLYCERIN SL tablet 0.4 mg (has no administration in time range)   aspirin tablet 325 mg (325 mg Oral Not Given 9/10/24 1018)   sucralfate 100 mg/mL suspension 1 g (1 g Oral Given 9/10/24 1109)   aluminum-magnesium hydroxide-simethicone 200-200-20 mg/5 mL suspension 30 mL (30 mLs Oral Given 9/10/24 1109)   fentaNYL 50 mcg/mL injection (50 mcg Intravenous Given 9/10/24 1233)   LIDOcaine (PF) 10 mg/ml (1%) injection (10 mLs Subcutaneous Given 9/10/24 1246)   iohexoL (OMNIPAQUE 350) injection  (60 mLs Intra-arterial Given 9/10/24 1302)   0.9%  NaCl infusion (has no administration in time range)   acetaminophen tablet 650 mg (has no administration in time range)   HYDROcodone-acetaminophen 5-325 mg per tablet 1 tablet (has no administration in time range)   ondansetron disintegrating tablet 8 mg (has no administration in time range)   amLODIPine tablet 10 mg (has no administration in time range)   atorvastatin tablet 40 mg (has no administration in time range)   montelukast tablet 10 mg (has no administration in time range)   tamsulosin 24 hr capsule 0.4 mg (has no administration in time range)   glucose chewable tablet 16 g (has no administration in time range)   glucose chewable tablet 24 g (has no administration in time range)   glucagon (human recombinant) injection 1 mg (has no administration in time range)   insulin aspart U-100 pen 0-10 Units (has no administration in time range)   potassium chloride SA CR tablet 40 mEq (has no administration in time range)   dextrose 10% bolus 125 mL 125 mL (has no administration in time range)   dextrose 10% bolus 250 mL 250 mL (has no administration in time range)   sodium chloride 0.9% bolus 500 mL 500 mL (500 mLs Intravenous New Bag 9/10/24 1011)   morphine injection 2 mg (2 mg Intravenous Given 9/10/24 1018)   morphine injection 2 mg (2 mg Intravenous Given 9/10/24 1109)   ondansetron injection 4 mg (4 mg Intravenous Given 9/10/24 1109)     Medical Decision Making  Amount and/or Complexity of Data Reviewed  Radiology: ordered.    Risk  OTC drugs.  Prescription drug management.  Parenteral controlled substances.  Drug therapy requiring intensive monitoring for toxicity.                                  73-year-old male presents for high-risk chest pain.    Differential diagnosis in this patient includes but is not limited to acute coronary syndrome, Pneumothorax, pneumonia.     Life-threatening diagnosis is in this patient include acute coronary  syndrome.    Patient was initially apprehensive to take nitroglycerin, patient educated about angina equivalents inpatient agreed to take nitroglycerin.      Chest x-ray independently interpreted with findings of interstitial lung disease.    Chest tightness has resolved.    Labs notable for decreased potassium at 3.1, elevated lactate however it took a long time to get the blood sample to the machine.  Patient was not tachycardic, not tachypneic, normotensive makes sepsis less likely.     Plan to admit to observation following cardiac catheterization.  I discussed this patient with cardiology who would like to cath the patient for high-risk chest pain.      Previous echo from 07/02/2024 reviewed EF is 40-45% at that time.  Longitudinal strain -13%.    Nuclear stress from 06/17/2024 reviewed notable for severe intensity fixed perfusion abnormality in apical anterior and apical wall in the LAD distribution.          Resident supervising staff physician attestation note: This is doctor Nelson dictating.  I examined this patient at 10:30 a.m..  The patient is still has some very mild chest tightness.  He has a history of a hemorrhagic stroke.  He does not want to take his 325 mg aspirin.  His chest tightness is very mild at this time.  He has a history of coronary artery disease with a stent.  We will consult Cardiology.  I agree with the resident documentation diagnostic and treatment plan.        Clinical Impression:  Final diagnoses:  [R07.9] Chest pain                 Gross, MD Speedy  Resident  09/10/24 [id number]

## 2024-09-10 NOTE — PROCEDURES
"Emmanuel Grant is a 73 y.o. male patient.    Temp: 97.3 °F (36.3 °C) (09/10/24 0806)  Pulse: 89 (09/10/24 0948)  Resp: 18 (09/10/24 1109)  BP: 130/89 (09/10/24 0948)  SpO2: 95 % (09/10/24 0948)  Weight: 62.6 kg (138 lb) (09/10/24 0806)  Height: 5' 8" (172.7 cm) (09/10/24 0806)       Procedures    Pre-sedation Assessment:    1. ASA Score: ASA 3 - Patient with moderate systemic disease with functional limitations  2. Mallampati Class: II (hard and soft palate, upper portion of tonsils anduvula visible)  3. Patient or family history of any reaction to anesthesia or sedation: No  4. Plan for Sedation: Moderate  5. H&P within 30 days of the procedure and updated within 24 hrs of admission or registration: Yes     9/10/2024    "

## 2024-09-10 NOTE — DISCHARGE INSTRUCTIONS
Drink plenty of fluids for the next 48 hours and follow your doctor's diet orders.  Rest for the next 72 hours. Try not to keep the injected leg bent for a long period of time.  Remove the dressing in 24 hours, and you may shower. Clean the area with soap and water, and apply a band aid for the  next 5 days.        No Lifting over 5-10 lbs., that is, not more than 1 gallon of water, or straining for 72 hours.    No driving, no drinking alcohol, and no signing legal documents for the next 24 hours.    Call your doctor for elevated temperature, shortness of breath, chest pain, or cold discolored leg.   If oozing occurs at the injections site, lie down. Apply pressure with a clean wash cloth for 20 to 30 minutes and call  your doctor.  If severe bleeding occurs, lie down, apply pressure. Call 911 and request an ambulance to take you to the nearest  hospital emergency room.  Continue to take your regular medications as instructed.  Follow the instructions in the handout given to you.Procedural Sedation  Procedural sedation is medicine to ease discomfort, pain, and anxiety during a procedure. The medicine is often given through an intravenous (IV) line in your arm or hand. In some cases, the medicine may be taken by mouth or inhaled. While you are under sedation, you will likely be awake. But you may not remember anything afterward.  Why procedural sedation is used  Sedation is used for many types of procedures. The goal is to reduce pain, anxiety, and stressful memories of a procedure. It can also help your health care provider treat you. For example, having a broken bone fixed may be easier if you feel relaxed.  Procedural sedation is used only for short, basic procedures. It is not used for complex surgeries. Some procedures that use this type of sedation include:  Dental surgery  Breast biopsy, to take a sample of breast tissue  Endoscopy, to look at gastrointestinal problems  Bronchoscopy, to check for lung  problems  Bone or joint realignment, to fix a broken bone or dislocated joint  Minor foot or skin surgery  Electrical cardioversion, to restore a normal heart rhythm  Lumbar puncture, to assess neurological disease  Risks of procedural sedation  Procedural sedation has some risks and possible side effects, such as:  Headache  Nausea and vomiting  Unpleasant memory of the procedure  Lowered rate of breathing  Changes in heart rate and blood pressure (rare)  Inhalation of stomach contents into your lungs (rare)  Side effects will likely go away shortly after the procedure. Your health care team will watch your heart rate and breathing during your sedation. This is to help prevent problems.  Your own risks may vary based on your age and your overall health. They also depend on the type of sedation you are given. Talk with your health care provider about the risks that apply most to you.  Getting ready for procedural sedation  Talk with your health care provider how to get ready for your procedure. Tell him or her about all the medicines you take. This includes over-the-counter medicines such as ibuprofen. It also includes vitamins, herbs, and other supplements. You may need to stop taking some medicines before the procedure, such as blood thinners and aspirin. If you smoke, you may need to stop. Talk with your health care provider if you need help to stop smoking.  Tell your health care provider if you:  Have had any problems in the past with sedation or anesthesia  Have had any recent changes in your health, such as an infection or fever  Are pregnant or think you may be pregnant  Also, make sure to:  Ask a family member or friend to take you home after the procedure. You cannot drive on the day you receive sedation.  Not eat or drink after midnight the night before your procedure, if advised.  Follow all other instructions from your health care provider.  During your procedural sedation  You may have your procedure  in a hospital or a medical clinic. Sedation is done by a trained health care provider. In general, you can expect the following:  You will be given medicine through an IV line in your arm or hand. Or you may receive a shot. The medicine may also be given by mouth. Or you may inhale it through a mask.  If you receive medicine through an IV, you may feel the effects very quickly. You will start to feel relaxed and drowsy.  During the procedure, your heart rate, breathing, and blood pressure will be closely watched. Your breathing and blood pressure may decrease a little. But you will likely not need help with your breathing. You may receive a little extra oxygen through a mask.  You will probably be awake the entire time. If you do fall asleep, you should be easy to wake up, if needed. You should feel little or no pain.  When your procedure is over, the sedative medicine will be stopped.  After your procedural sedation  You will begin to feel more awake and aware. But you will likely be drowsy for a while afterward. You will be closely watched as you become more alert. You may have a faint memory of the procedure. Or you may not remember it at all.  You should be able to return home within an hour or two after your procedure. Plan to have someone stay with you for a few hours. Side effects such as headache and nausea may go away quickly. Tell your health care provider if they continue.  Dont drive or make any important decisions for at least 24 hours. Be sure to follow all after-care instructions.      When to call your health care provider  Have someone call your health care provider right away if you have any of these:  Drowsiness that gets worse  Weakness or dizziness that gets worse  Repeated vomiting  You cant be awakened   Date Last Reviewed: 2/6/2015  © 3407-7031 [employer]. [address] [place] [address], [place] [address]. All rights reserved. This information is not intended as a substitute for  professional medical care. Always follow your healthcare professional's instructions.

## 2024-09-10 NOTE — DISCHARGE SUMMARY
[facility] Medicine  Discharge Summary      Patient Name: Emmanuel Grant  MRN: [id number]  ALVARO: [id number]  Patient Class: OP- Observation  Admission Date: 9/10/2024  Hospital Length of Stay: 0 days  Discharge Date and Time:  09/10/2024 2:19 PM  Attending Physician: Ranjana Hernandez, *   Discharging Provider: Ranjana Hernandez MD  Primary Care Provider: Payam Vu Jr., NP    Primary Care Team: Networked reference to record PCT     HPI:   73-year-old male, hyperlipidemia, diabetes, no previous cardiac catheterization presents for 2-1/2 hours of chest tightness and pain. Patient states pain is nonpositional, is exertional. Patient does not know of a history of acute coronary syndrome. No diagnosis of angina pectoralis. Patient denies back pain. Duo to concern for unstable angina,cardiology was consulted and patient went to [facility].    Procedure(s) (LRB):  Left heart cath (Left)      Hospital Course:   Patient was placed in observations for unstable angina,cardiology was consulted.  S/P [facility],  9/10/24 [facility] - EDP 17, LAD stent patent - luminal irregularities otherwise, D1  - stent shelter - fills late - small vessel, Cx/RCA luminal irregularities  LFA access, right FA sheath in dissection     Non-obstructive CAD  Medical Rx  Angioseal left FA  Manual pressure right FA - in dissection  Ok for d/c after 6 hour bed rest  Continue ASA 81 qd,  Patient remains chest pain after after C a,patient was discharged home with PCP and  cardiology follow up.       Goals of Care Treatment Preferences:  Code Status: Full Code         Consults:   Consults (From admission, onward)          Status Ordering Provider     Inpatient consult to Hospitalist  Once        Provider:  Ranjana Hernandez MD    Acknowledged MALLY DRUMMOND     Inpatient consult to Cardiology  Once        Provider:  Mally Drummond MD    Completed ROGER MCGARRY     Inpatient consult to Cardiology  Once        Provider:  (Not yet  assigned)    Completed GT[facility], [place]            No new Assessment & Plan notes have been filed under this hospital service since the last note was generated.  Service: Hospital Medicine    Final Active Diagnoses:    Diagnosis Date Noted POA    PRINCIPAL PROBLEM:  Unstable angina [I20.0] 09/10/2024 Yes    Chronic HFrEF (heart failure with reduced ejection fraction) [I50.22] 05/01/2024 Yes    Type 2 diabetes mellitus with hyperglycemia [E11.65] 02/28/2024 Yes    BPH (benign prostatic hyperplasia) [N40.0] 02/28/2024 Yes    Hyperlipidemia [E78.5] 05/29/2020 Yes    Essential hypertension [I10] 05/29/2020 Yes      Problems Resolved During this Admission:       Discharged Condition: stable    Disposition: Home or Self Care    Follow Up:   Follow-up Information       Payam Vu Jr., NP Follow up in 1 week(s).    Specialty: Internal Medicine  Contact information:  [address] [place] [address] [id number] [phone]                           Patient Instructions:      Activity as tolerated       Significant Diagnostic Studies: Labs: BMP:   Recent Labs   Lab 09/10/24  0826   *      K 3.1*   CL 99   CO2 27   BUN 9   CREATININE 0.9   CALCIUM 9.1   , CMP   Recent Labs   Lab 09/10/24  0826      K 3.1*   CL 99   CO2 27   *   BUN 9   CREATININE 0.9   CALCIUM 9.1   PROT 7.1   ALBUMIN 3.4*   BILITOT 0.6   ALKPHOS 53*   AST 21   ALT 18   ANIONGAP 12   , and CBC   Recent Labs   Lab 09/10/24  0826   WBC 7.49   HGB 14.2   HCT 40.3        Radiology: X-Ray: CXR: X-Ray Chest 1 View (CXR): No results found for this visit on 09/10/24. and X-Ray Chest PA and Lateral (CXR): No results found for this visit on 09/10/24.    Pending Diagnostic Studies:       Procedure Component Value Units Date/Time    EKG 12-lead [[id number]]     Order Status: Sent Lab Status: No result            Medications:  Reconciled Home Medications:      Medication List        CONTINUE taking these medications   "    albuterol-ipratropium 2.5 mg-0.5 mg/3 mL nebulizer solution  Commonly known as: DUO-NEB  Take 3 mLs by nebulization every 6 (six) hours as needed for Wheezing. Rescue     amLODIPine 10 MG tablet  Commonly known as: NORVASC  Take 1 tablet (10 mg total) by mouth once daily.     aspirin 81 MG Chew  Take 81 mg by mouth once daily.     atorvastatin 40 MG tablet  Commonly known as: LIPITOR  Take 1 tablet (40 mg total) by mouth every evening.     BD ULTRA-FINE HEATHER PEN NEEDLE 32 gauge x 5/32" Ndle  Generic drug: pen needle, diabetic  Use to inject 3 (three) times daily before meals.     BEANO ORAL  Take 1 tablet by mouth 2 (two) times a day.     * blood sugar diagnostic Strp  To check BG 1 times daily, to use with insurance preferred meter     * blood sugar diagnostic Strp  To check BG 3 times daily, to use with insurance preferred meter     blood-glucose meter kit  To check BG 1 times daily, to use with insurance preferred meter     carvediloL 25 MG tablet  Commonly known as: COREG  Take 1 tablet (25 mg total) by mouth 2 (two) times daily with meals.     cholecalciferol (vitamin D3) 50 mcg (2,000 unit) Cap capsule  Commonly known as: VITAMIN D3  1 capsule.     DRY EYE RELIEF OPHT  Apply to eye.     ENTRESTO 24-26 mg per tablet  Generic drug: sacubitriL-valsartan  Take 1 tablet by mouth 2 (two) times daily.     * fluticasone propionate 50 mcg/actuation nasal spray  Commonly known as: FLONASE  1 spray in each nostril Nasally Once a day     * fluticasone propionate 50 mcg/actuation nasal spray  Commonly known as: FLONASE  1 spray (50 mcg total) by Each Nostril route once daily.     fluticasone-salmeterol 250-50 mcg/dose 250-50 mcg/dose diskus inhaler  Commonly known as: ADVAIR DISKUS  Inhale 1 puff into the lungs 2 (two) times daily. Controller     FREESTYLE RAMBO 3 READER Misc  Generic drug: blood-glucose meter,continuous  USE WITH RAMBO 3 SENSORS     FREESTYLE RAMBO 3 SENSOR Blanche  Generic drug: blood-glucose " sensor  Change every 14 days     furosemide 20 MG tablet  Commonly known as: LASIX  Take 1 tablet (20 mg total) by mouth once daily.     glimepiride 2 MG tablet  Commonly known as: AMARYL  Take 2 mg by mouth 2 (two) times a day.     insulin lispro 100 unit/mL pen  Commonly known as: HumaLOG KwikPen Insulin  INJECT AS NEEDED BEFORE MEALS: 150-200=+2, 201-250=+4; 251-300=+6; 301-350=+8, over 350=+10. MAX DAILY DOSE 30 UNITSunits     * lancets Misc  To check BG 1 times daily, to use with insurance preferred meter     * lancets Misc  To check BG 3 times daily, to use with insurance preferred meter     latanoprost 0.005 % ophthalmic solution  Place 1 drop into both eyes every evening.     magnesium citrate solution  Take half the bottle for constipation as needed.     metFORMIN 1000 MG tablet  Commonly known as: GLUCOPHAGE  Take 1 tablet (1,000 mg total) by mouth 2 (two) times daily with meals.     montelukast 10 mg tablet  Commonly known as: SINGULAIR  Take 10 mg by mouth every evening.     omeprazole 40 MG capsule  Commonly known as: PRILOSEC  Take 1 capsule (40 mg total) by mouth once daily.     * polyethylene glycol 17 gram Pwpk  Commonly known as: GLYCOLAX  Take by mouth.     * polyethylene glycol 17 gram/dose powder  Commonly known as: GLYCOLAX  Take 17 g by mouth daily as needed (constipation).     SENNA WITH DOCUSATE SODIUM 8.6-50 mg per tablet  Generic drug: senna-docusate 8.6-50 mg  Take 1 tablet by mouth once daily.     sucralfate 1 gram tablet  Commonly known as: CARAFATE  Take 1 g by mouth 4 (four) times daily.     tamsulosin 0.4 mg Cap  Commonly known as: FLOMAX  Take 1 capsule (0.4 mg total) by mouth once daily.           * This list has 8 medication(s) that are the same as other medications prescribed for you. Read the directions carefully, and ask your doctor or other care provider to review them with you.                  Indwelling Lines/Drains at time of discharge:   Lines/Drains/Airways        None                   Time spent on the discharge of patient:  less than 30  minutes         Ranjana Hernandez MD  Department of [facility]  [facility] [place] - Surgery

## 2024-09-10 NOTE — HPI
73-year-old male, hyperlipidemia, diabetes, no previous cardiac catheterization presents for 2-1/2 hours of chest tightness and pain.  Patient states pain is nonpositional, is exertional.  Patient does not know of a history of acute coronary syndrome.  No diagnosis of angina pectoralis.  Patient denies back pain.     Patient denies nausea at this time.  No vomiting.  Patient denies shortness of breath.       Still with chest tightness and mild BOSTON  EKG NSR PRWP - no acute STT changes  Troponin negative  BNP 84    Followed by Dr Ventura  # CAD/MI/PCI, MPI 6/2024 with predom fixed LAD defect and mild LV dysfxn.  # ?HFmEF, some crackles and SOB on exam today (but also with ILD)  # ILD, follows with Dr. 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    There is a severe intensity, medium sized, mostly fixed perfusion abnormality with minimal reversibilty in the mid to apical anterior and apical wall(s) in the typical distribution of the LAD territory.  This is conssistent with scar and minimal periinfarct ischemia.    There are no other significant perfusion abnormalities.    The gated perfusion images showed an ejection fraction of 40% post stress.    There is anteroapical wall akinesis during stress.     Echo 6/17/24 (images personally reviewed and interpreted)    Left Ventricle: The left ventricle is normal in size. Normal wall thickness. Mild global hypokinesis present, cannot exclude anterolateral WMA. There is mildly reduced systolic function with a visually estimated ejection fraction of 40 - 45%. Grade I diastolic dysfunction.    Right Ventricle: Normal right ventricular cavity size. Systolic function is normal.    Aorta: Aortic root is mildly dilated measuring 3.80 cm.

## 2024-09-10 NOTE — Clinical Note
The catheter was inserted into the and insertion attempt was made into the ostium   left main. An angiography was performed Multiple views were taken.

## 2024-09-10 NOTE — ED TRIAGE NOTES
"Pt presents with mid sternal " heavy " chest pressure since 7am after attempting to eat cereal. Pt had 2 tylenol PTA with some relief but states " the pressure is heavy "   "

## 2024-09-10 NOTE — HOSPITAL COURSE
Patient was placed in observations for unstable angina,cardiology was consulted.  S/P [facility],  9/10/24 [facility] - EDP 17, LAD stent patent - luminal irregularities otherwise, D1  - stent skilled nursing - fills late - small vessel, Cx/RCA luminal irregularities  LFA access, right FA sheath in dissection     Non-obstructive CAD  Medical Rx  Angioseal left FA  Manual pressure right FA - in dissection  Ok for d/c after 6 hour bed rest  Continue ASA 81 qd,  Patient remains chest pain after after C a,patient was discharged home with PCP and  cardiology follow up.

## 2024-09-10 NOTE — ASSESSMENT & PLAN NOTE
Chronic, controlled. Latest blood pressure and vitals reviewed-     Temp:  [97.3 °F (36.3 °C)]   Pulse:  [82-89]   Resp:  [16-22]   BP: (125-151)/(79-91)   SpO2:  [94 %-98 %] .   Home meds for hypertension were reviewed and noted below.   Hypertension Medications               amLODIPine (NORVASC) 10 MG tablet Take 1 tablet (10 mg total) by mouth once daily.    carvediloL (COREG) 25 MG tablet Take 1 tablet (25 mg total) by mouth 2 (two) times daily with meals.    furosemide (LASIX) 20 MG tablet Take 1 tablet (20 mg total) by mouth once daily.    sacubitriL-valsartan (ENTRESTO) 24-26 mg per tablet Take 1 tablet by mouth 2 (two) times daily.            While in the hospital, will manage blood pressure as follows; Continue home antihypertensive regimen    Will utilize p.r.n. blood pressure medication only if patient's blood pressure greater than 160/100 and he develops symptoms such as worsening chest pain or shortness of breath.

## 2024-09-10 NOTE — CONSULTS
[facility] - Emergency Dept  Cardiology  Consult Note    Patient Name: Emmanuel Grant  MRN: [id number]  Admission Date: 9/10/2024  Hospital Length of Stay: 0 days  Code Status: Prior   Attending Provider: Vipin Damon MD   Consulting Provider: Jemal Drummond MD  Primary Care Physician: Payam Vu Jr., NP  Principal Problem:<principal problem not specified>    Patient information was obtained from patient and ER records.     Inpatient consult to Cardiology  Consult performed by: Jemal Drummond MD  Consult ordered by: Vipin Damon MD        Subjective:     Chief Complaint:  CP, CAD     HPI:     73-year-old male, hyperlipidemia, diabetes, no previous cardiac catheterization presents for 2-1/2 hours of chest tightness and pain.  Patient states pain is nonpositional, is exertional.  Patient does not know of a history of acute coronary syndrome.  No diagnosis of angina pectoralis.  Patient denies back pain.     Patient denies nausea at this time.  No vomiting.  Patient denies shortness of breath.       Still with chest tightness and mild BOSTON  EKG NSR PRWP - no acute STT changes  Troponin negative  BNP 84    Followed by Dr Ventura  # CAD/MI/PCI, MPI 6/2024 with predom fixed LAD defect and mild LV dysfxn.  # ?HFmEF, some crackles and SOB on exam today (but also with ILD)  # ILD, follows with Dr. 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    There is a severe intensity, medium sized, mostly fixed perfusion abnormality with minimal reversibilty in the mid to apical anterior and apical wall(s) in the typical distribution of the LAD territory.  This is conssistent with scar and minimal periinfarct ischemia.    There are no other significant perfusion abnormalities.    The gated perfusion images showed an ejection fraction of 40% post stress.    There is anteroapical wall akinesis  "during stress.     Echo 6/17/24 (images personally reviewed and interpreted)    Left Ventricle: The left ventricle is normal in size. Normal wall thickness. Mild global hypokinesis present, cannot exclude anterolateral WMA. There is mildly reduced systolic function with a visually estimated ejection fraction of 40 - 45%. Grade I diastolic dysfunction.    Right Ventricle: Normal right ventricular cavity size. Systolic function is normal.    Aorta: Aortic root is mildly dilated measuring 3.80 cm.    Past Medical History:   Diagnosis Date    CAD (coronary artery disease)     Sees [facility], h/o stent    Diabetes mellitus     Hypertension     Kidney stone     Stroke     age 62       Past Surgical History:   Procedure Laterality Date    CARDIAC CATHETERIZATION      with stent    COLONOSCOPY N/A 03/27/2024    Procedure: COLONOSCOPY;  Surgeon: Ariela Castro MD;  Location: [facility];  Service: Endoscopy;  Laterality: N/A;    ESOPHAGOGASTRODUODENOSCOPY N/A 03/27/2024    Procedure: EGD (ESOPHAGOGASTRODUODENOSCOPY);  Surgeon: Ariela Castro MD;  Location: [facility];  Service: Endoscopy;  Laterality: N/A;    SHOULDER SURGERY Right        Review of patient's allergies indicates:   Allergen Reactions    Dapagliflozin      Other reaction(s): Other (See Comments)    Pcn [penicillins]     Linagliptin Other (See Comments)     "it knocked me down", "it almost killed me"    Lisinopril Other (See Comments)     cough    Pantoprazole Hives       No current facility-administered medications on file prior to encounter.     Current Outpatient Medications on File Prior to Encounter   Medication Sig    amLODIPine (NORVASC) 10 MG tablet Take 1 tablet (10 mg total) by mouth once daily.    aspirin 81 MG Chew Take 81 mg by mouth once daily.    atorvastatin (LIPITOR) 40 MG tablet Take 1 tablet (40 mg total) by mouth every evening.    carvediloL (COREG) 25 MG tablet Take 1 tablet (25 mg total) by mouth 2 (two) times daily with meals. "    cholecalciferol, vitamin D3, (VITAMIN D3) 50 mcg (2,000 unit) Cap capsule 1 capsule.    furosemide (LASIX) 20 MG tablet Take 1 tablet (20 mg total) by mouth once daily.    metFORMIN (GLUCOPHAGE) 1000 MG tablet Take 1 tablet (1,000 mg total) by mouth 2 (two) times daily with meals.    albuterol-ipratropium (DUO-NEB) 2.5 mg-0.5 mg/3 mL nebulizer solution Take 3 mLs by nebulization every 6 (six) hours as needed for Wheezing. Rescue    alpha-D-galactosidase (BEANO ORAL) Take 1 tablet by mouth 2 (two) times a day.    blood sugar diagnostic Strp To check BG 1 times daily, to use with insurance preferred meter    blood sugar diagnostic Strp To check BG 3 times daily, to use with insurance preferred meter    blood-glucose meter kit To check BG 1 times daily, to use with insurance preferred meter    blood-glucose meter,continuous (FREESTYLE RAMBO 3 READER) Misc USE WITH RAMBO 3 SENSORS    blood-glucose sensor (FREESTYLE RAMBO 3 SENSOR) Blanche Change every 14 days    fluticasone propionate (FLONASE) 50 mcg/actuation nasal spray 1 spray in each nostril Nasally Once a day    fluticasone propionate (FLONASE) 50 mcg/actuation nasal spray 1 spray (50 mcg total) by Each Nostril route once daily.    fluticasone-salmeterol diskus inhaler 250-50 mcg Inhale 1 puff into the lungs 2 (two) times daily. Controller    glimepiride (AMARYL) 2 MG tablet Take 2 mg by mouth 2 (two) times a day.    insulin lispro (HUMALOG KWIKPEN INSULIN) 100 unit/mL pen INJECT AS NEEDED BEFORE MEALS: 150-200=+2, 201-250=+4; 251-300=+6; 301-350=+8, over 350=+10. MAX DAILY DOSE 30 UNITSunits    lancets Misc To check BG 1 times daily, to use with insurance preferred meter    lancets Misc To check BG 3 times daily, to use with insurance preferred meter    latanoprost 0.005 % ophthalmic solution Place 1 drop into both eyes every evening.    magnesium citrate solution Take half the bottle for constipation as needed.    montelukast (SINGULAIR) 10 mg tablet Take 10 mg  "by mouth every evening.    omeprazole (PRILOSEC) 40 MG capsule Take 1 capsule (40 mg total) by mouth once daily.    peg 400/hypromellose/glycerin (DRY EYE RELIEF OPHT) Apply to eye.    pen needle, diabetic (BD ULTRA-FINE HEATHER PEN NEEDLE) 32 gauge x 5/32" Ndle Use to inject 3 (three) times daily before meals.    polyethylene glycol (GLYCOLAX) 17 gram PwPk Take by mouth.    polyethylene glycol (GLYCOLAX) 17 gram/dose powder Take 17 g by mouth daily as needed (constipation).    sacubitriL-valsartan (ENTRESTO) 24-26 mg per tablet Take 1 tablet by mouth 2 (two) times daily.    senna-docusate 8.6-50 mg (SENNA WITH DOCUSATE SODIUM) 8.6-50 mg per tablet Take 1 tablet by mouth once daily.    sucralfate (CARAFATE) 1 gram tablet Take 1 g by mouth 4 (four) times daily.    tamsulosin (FLOMAX) 0.4 mg Cap Take 1 capsule (0.4 mg total) by mouth once daily.     Family History       Problem Relation (Age of Onset)    Cancer Mother    Colon cancer Sister          Tobacco Use    Smoking status: Never     Passive exposure: Never    Smokeless tobacco: Never   Substance and Sexual Activity    Alcohol use: No    Drug use: Never    Sexual activity: Not Currently     Review of Systems   Constitutional: Negative for decreased appetite.   HENT:  Negative for ear discharge.    Eyes:  Negative for blurred vision.   Endocrine: Negative for polyphagia.   Skin:  Negative for nail changes.   Genitourinary:  Negative for bladder incontinence.   Neurological:  Negative for aphonia.   Psychiatric/Behavioral:  Negative for hallucinations.    Allergic/Immunologic: Negative for hives.     Objective:     Vital Signs (Most Recent):  Temp: 97.3 °F (36.3 °C) (09/10/24 0806)  Pulse: 89 (09/10/24 0948)  Resp: 18 (09/10/24 1109)  BP: 130/89 (09/10/24 0948)  SpO2: 95 % (09/10/24 0948) Vital Signs (24h Range):  Temp:  [97.3 °F (36.3 °C)] 97.3 °F (36.3 °C)  Pulse:  [82-89] 89  Resp:  [16-22] 18  SpO2:  [94 %-98 %] 95 %  BP: (125-151)/(79-91) 130/89     Weight: 62.6 " kg (138 lb)  Body mass index is 20.98 kg/m².    SpO2: 95 %       No intake or output data in the 24 hours ending 09/10/24 1121    Lines/Drains/Airways       Peripheral Intravenous Line  Duration                  Peripheral IV - Single Lumen 09/10/24 0826 20 G Right Antecubital <1 day                     Physical Exam  Constitutional:       Appearance: He is well-developed.   HENT:      Head: Normocephalic and atraumatic.   Eyes:      Conjunctiva/sclera: Conjunctivae normal.      Pupils: Pupils are equal, round, and reactive to light.   Cardiovascular:      Rate and Rhythm: Normal rate.      Pulses: Intact distal pulses.      Heart sounds: Normal heart sounds.   Pulmonary:      Effort: Pulmonary effort is normal.      Breath sounds: Normal breath sounds.   Abdominal:      General: Bowel sounds are normal.      Palpations: Abdomen is soft.   Musculoskeletal:         General: Normal range of motion.      Cervical back: Normal range of motion and neck supple.   Skin:     General: Skin is warm and dry.   Neurological:      Mental Status: He is alert and oriented to person, place, and time.          Significant Labs: All pertinent lab results from the last 24 hours have been reviewed.    Significant Imaging: Echocardiogram: 2D echo with color flow doppler: No results found for this or any previous visit.  Assessment and Plan:     Chronic HFrEF (heart failure with reduced ejection fraction)  EF 40-45% by recent echo. Volume status stable - normal BNP. Repeat echo    Hyperlipidemia  On lipitor      Type 2 diabetes mellitus with hyperglycemia  Per primary  Last A1c reviewed-   Lab Results   Component Value Date    HGBA1C 8.3 (H) 08/05/2024       Antihyperglycemics (From admission, onward)      None          Hold Oral hypoglycemics while patient is in the hospital.    Coronary artery disease involving native coronary artery of native heart with angina pectoris  Known ischemic CM with LAD infarct by stress test 6/2024. Now  with acute CP unrelieved in ER with NTG. Took ASA - patient would like to hold off on heparin or plavix load given Hx CVA > 10 years ago. He would like to proceed with LHC and is agreeable to anticoagulation if needed for PCI. Case discussed with Dr Ventura - I will perform LHC and he will do PCI if needed - Risks/benefits explained - consent on chart        VTE Risk Mitigation (From admission, onward)      None            Thank you for your consult. I will follow-up with patient. Please contact us if you have any additional questions.    Jemal Drummond MD  Cardiology   [facility] - Emergency Dept

## 2024-09-10 NOTE — SUBJECTIVE & OBJECTIVE
"Past Medical History:   Diagnosis Date    CAD (coronary artery disease)     Sees [facility], h/o stent    Diabetes mellitus     Hypertension     Kidney stone     Stroke     age 62       Past Surgical History:   Procedure Laterality Date    CARDIAC CATHETERIZATION      with stent    COLONOSCOPY N/A 03/27/2024    Procedure: COLONOSCOPY;  Surgeon: Ariela Castro MD;  Location: [facility] ENDO;  Service: Endoscopy;  Laterality: N/A;    ESOPHAGOGASTRODUODENOSCOPY N/A 03/27/2024    Procedure: EGD (ESOPHAGOGASTRODUODENOSCOPY);  Surgeon: Ariela Castro MD;  Location: [facility] ENDO;  Service: Endoscopy;  Laterality: N/A;    SHOULDER SURGERY Right        Review of patient's allergies indicates:   Allergen Reactions    Dapagliflozin      Other reaction(s): Other (See Comments)    Pcn [penicillins]     Linagliptin Other (See Comments)     "it knocked me down", "it almost killed me"    Lisinopril Other (See Comments)     cough    Pantoprazole Hives       No current facility-administered medications on file prior to encounter.     Current Outpatient Medications on File Prior to Encounter   Medication Sig    amLODIPine (NORVASC) 10 MG tablet Take 1 tablet (10 mg total) by mouth once daily.    aspirin 81 MG Chew Take 81 mg by mouth once daily.    atorvastatin (LIPITOR) 40 MG tablet Take 1 tablet (40 mg total) by mouth every evening.    carvediloL (COREG) 25 MG tablet Take 1 tablet (25 mg total) by mouth 2 (two) times daily with meals.    cholecalciferol, vitamin D3, (VITAMIN D3) 50 mcg (2,000 unit) Cap capsule 1 capsule.    furosemide (LASIX) 20 MG tablet Take 1 tablet (20 mg total) by mouth once daily.    metFORMIN (GLUCOPHAGE) 1000 MG tablet Take 1 tablet (1,000 mg total) by mouth 2 (two) times daily with meals.    albuterol-ipratropium (DUO-NEB) 2.5 mg-0.5 mg/3 mL nebulizer solution Take 3 mLs by nebulization every 6 (six) hours as needed for Wheezing. Rescue    alpha-D-galactosidase (BEANO ORAL) Take 1 tablet by mouth 2 (two) " "times a day.    blood sugar diagnostic Strp To check BG 1 times daily, to use with insurance preferred meter    blood sugar diagnostic Strp To check BG 3 times daily, to use with insurance preferred meter    blood-glucose meter kit To check BG 1 times daily, to use with insurance preferred meter    blood-glucose meter,continuous (FREESTYLE RAMBO 3 READER) Misc USE WITH RAMBO 3 SENSORS    blood-glucose sensor (FREESTYLE RAMBO 3 SENSOR) Blanche Change every 14 days    fluticasone propionate (FLONASE) 50 mcg/actuation nasal spray 1 spray in each nostril Nasally Once a day    fluticasone propionate (FLONASE) 50 mcg/actuation nasal spray 1 spray (50 mcg total) by Each Nostril route once daily.    fluticasone-salmeterol diskus inhaler 250-50 mcg Inhale 1 puff into the lungs 2 (two) times daily. Controller    glimepiride (AMARYL) 2 MG tablet Take 2 mg by mouth 2 (two) times a day.    insulin lispro (HUMALOG KWIKPEN INSULIN) 100 unit/mL pen INJECT AS NEEDED BEFORE MEALS: 150-200=+2, 201-250=+4; 251-300=+6; 301-350=+8, over 350=+10. MAX DAILY DOSE 30 UNITSunits    lancets Misc To check BG 1 times daily, to use with insurance preferred meter    lancets Misc To check BG 3 times daily, to use with insurance preferred meter    latanoprost 0.005 % ophthalmic solution Place 1 drop into both eyes every evening.    magnesium citrate solution Take half the bottle for constipation as needed.    montelukast (SINGULAIR) 10 mg tablet Take 10 mg by mouth every evening.    omeprazole (PRILOSEC) 40 MG capsule Take 1 capsule (40 mg total) by mouth once daily.    peg 400/hypromellose/glycerin (DRY EYE RELIEF OPHT) Apply to eye.    pen needle, diabetic (BD ULTRA-FINE HEATHER PEN NEEDLE) 32 gauge x 5/32" Ndle Use to inject 3 (three) times daily before meals.    polyethylene glycol (GLYCOLAX) 17 gram PwPk Take by mouth.    polyethylene glycol (GLYCOLAX) 17 gram/dose powder Take 17 g by mouth daily as needed (constipation).    sacubitriL-valsartan " (ENTRESTO) 24-26 mg per tablet Take 1 tablet by mouth 2 (two) times daily.    senna-docusate 8.6-50 mg (SENNA WITH DOCUSATE SODIUM) 8.6-50 mg per tablet Take 1 tablet by mouth once daily.    sucralfate (CARAFATE) 1 gram tablet Take 1 g by mouth 4 (four) times daily.    tamsulosin (FLOMAX) 0.4 mg Cap Take 1 capsule (0.4 mg total) by mouth once daily.     Family History       Problem Relation (Age of Onset)    Cancer Mother    Colon cancer Sister          Tobacco Use    Smoking status: Never     Passive exposure: Never    Smokeless tobacco: Never   Substance and Sexual Activity    Alcohol use: No    Drug use: Never    Sexual activity: Not Currently     Review of Systems   Constitutional: Negative for decreased appetite.   HENT:  Negative for ear discharge.    Eyes:  Negative for blurred vision.   Endocrine: Negative for polyphagia.   Skin:  Negative for nail changes.   Genitourinary:  Negative for bladder incontinence.   Neurological:  Negative for aphonia.   Psychiatric/Behavioral:  Negative for hallucinations.    Allergic/Immunologic: Negative for hives.     Objective:     Vital Signs (Most Recent):  Temp: 97.3 °F (36.3 °C) (09/10/24 0806)  Pulse: 89 (09/10/24 0948)  Resp: 18 (09/10/24 1109)  BP: 130/89 (09/10/24 0948)  SpO2: 95 % (09/10/24 0948) Vital Signs (24h Range):  Temp:  [97.3 °F (36.3 °C)] 97.3 °F (36.3 °C)  Pulse:  [82-89] 89  Resp:  [16-22] 18  SpO2:  [94 %-98 %] 95 %  BP: (125-151)/(79-91) 130/89     Weight: 62.6 kg (138 lb)  Body mass index is 20.98 kg/m².    SpO2: 95 %       No intake or output data in the 24 hours ending 09/10/24 1121    Lines/Drains/Airways       Peripheral Intravenous Line  Duration                  Peripheral IV - Single Lumen 09/10/24 0826 20 G Right Antecubital <1 day                     Physical Exam  Constitutional:       Appearance: He is well-developed.   HENT:      Head: Normocephalic and atraumatic.   Eyes:      Conjunctiva/sclera: Conjunctivae normal.      Pupils: Pupils  are equal, round, and reactive to light.   Cardiovascular:      Rate and Rhythm: Normal rate.      Pulses: Intact distal pulses.      Heart sounds: Normal heart sounds.   Pulmonary:      Effort: Pulmonary effort is normal.      Breath sounds: Normal breath sounds.   Abdominal:      General: Bowel sounds are normal.      Palpations: Abdomen is soft.   Musculoskeletal:         General: Normal range of motion.      Cervical back: Normal range of motion and neck supple.   Skin:     General: Skin is warm and dry.   Neurological:      Mental Status: He is alert and oriented to person, place, and time.          Significant Labs: All pertinent lab results from the last 24 hours have been reviewed.    Significant Imaging: Echocardiogram: 2D echo with color flow doppler: No results found for this or any previous visit.

## 2024-09-10 NOTE — Clinical Note
The catheter was removed from the and insertion attempt was made into the ostium   right coronary artery.

## 2024-09-10 NOTE — SUBJECTIVE & OBJECTIVE
"Past Medical History:   Diagnosis Date    CAD (coronary artery disease)     Sees [facility], h/o stent    Diabetes mellitus     Hypertension     Kidney stone     Stroke     age 62       Past Surgical History:   Procedure Laterality Date    CARDIAC CATHETERIZATION      with stent    COLONOSCOPY N/A 03/27/2024    Procedure: COLONOSCOPY;  Surgeon: Ariela Castro MD;  Location: [facility] ENDO;  Service: Endoscopy;  Laterality: N/A;    ESOPHAGOGASTRODUODENOSCOPY N/A 03/27/2024    Procedure: EGD (ESOPHAGOGASTRODUODENOSCOPY);  Surgeon: Ariela Castro MD;  Location: [facility] ENDO;  Service: Endoscopy;  Laterality: N/A;    SHOULDER SURGERY Right        Review of patient's allergies indicates:   Allergen Reactions    Dapagliflozin      Other reaction(s): Other (See Comments)    Pcn [penicillins]     Linagliptin Other (See Comments)     "it knocked me down", "it almost killed me"    Lisinopril Other (See Comments)     cough    Pantoprazole Hives       No current facility-administered medications on file prior to encounter.     Current Outpatient Medications on File Prior to Encounter   Medication Sig    amLODIPine (NORVASC) 10 MG tablet Take 1 tablet (10 mg total) by mouth once daily.    aspirin 81 MG Chew Take 81 mg by mouth once daily.    atorvastatin (LIPITOR) 40 MG tablet Take 1 tablet (40 mg total) by mouth every evening.    carvediloL (COREG) 25 MG tablet Take 1 tablet (25 mg total) by mouth 2 (two) times daily with meals.    cholecalciferol, vitamin D3, (VITAMIN D3) 50 mcg (2,000 unit) Cap capsule 1 capsule.    furosemide (LASIX) 20 MG tablet Take 1 tablet (20 mg total) by mouth once daily.    metFORMIN (GLUCOPHAGE) 1000 MG tablet Take 1 tablet (1,000 mg total) by mouth 2 (two) times daily with meals.    albuterol-ipratropium (DUO-NEB) 2.5 mg-0.5 mg/3 mL nebulizer solution Take 3 mLs by nebulization every 6 (six) hours as needed for Wheezing. Rescue    alpha-D-galactosidase (BEANO ORAL) Take 1 tablet by mouth 2 (two) " "times a day.    blood sugar diagnostic Strp To check BG 1 times daily, to use with insurance preferred meter    blood sugar diagnostic Strp To check BG 3 times daily, to use with insurance preferred meter    blood-glucose meter kit To check BG 1 times daily, to use with insurance preferred meter    blood-glucose meter,continuous (FREESTYLE RAMBO 3 READER) Misc USE WITH RAMBO 3 SENSORS    blood-glucose sensor (FREESTYLE RAMBO 3 SENSOR) Blanche Change every 14 days    fluticasone propionate (FLONASE) 50 mcg/actuation nasal spray 1 spray in each nostril Nasally Once a day    fluticasone propionate (FLONASE) 50 mcg/actuation nasal spray 1 spray (50 mcg total) by Each Nostril route once daily.    fluticasone-salmeterol diskus inhaler 250-50 mcg Inhale 1 puff into the lungs 2 (two) times daily. Controller    glimepiride (AMARYL) 2 MG tablet Take 2 mg by mouth 2 (two) times a day.    insulin lispro (HUMALOG KWIKPEN INSULIN) 100 unit/mL pen INJECT AS NEEDED BEFORE MEALS: 150-200=+2, 201-250=+4; 251-300=+6; 301-350=+8, over 350=+10. MAX DAILY DOSE 30 UNITSunits    lancets Misc To check BG 1 times daily, to use with insurance preferred meter    lancets Misc To check BG 3 times daily, to use with insurance preferred meter    latanoprost 0.005 % ophthalmic solution Place 1 drop into both eyes every evening.    magnesium citrate solution Take half the bottle for constipation as needed.    montelukast (SINGULAIR) 10 mg tablet Take 10 mg by mouth every evening.    omeprazole (PRILOSEC) 40 MG capsule Take 1 capsule (40 mg total) by mouth once daily.    peg 400/hypromellose/glycerin (DRY EYE RELIEF OPHT) Apply to eye.    pen needle, diabetic (BD ULTRA-FINE HEATHER PEN NEEDLE) 32 gauge x 5/32" Ndle Use to inject 3 (three) times daily before meals.    polyethylene glycol (GLYCOLAX) 17 gram PwPk Take by mouth.    polyethylene glycol (GLYCOLAX) 17 gram/dose powder Take 17 g by mouth daily as needed (constipation).    sacubitriL-valsartan " (ENTRESTO) 24-26 mg per tablet Take 1 tablet by mouth 2 (two) times daily.    senna-docusate 8.6-50 mg (SENNA WITH DOCUSATE SODIUM) 8.6-50 mg per tablet Take 1 tablet by mouth once daily.    sucralfate (CARAFATE) 1 gram tablet Take 1 g by mouth 4 (four) times daily.    tamsulosin (FLOMAX) 0.4 mg Cap Take 1 capsule (0.4 mg total) by mouth once daily.     Family History       Problem Relation (Age of Onset)    Cancer Mother    Colon cancer Sister          Tobacco Use    Smoking status: Never     Passive exposure: Never    Smokeless tobacco: Never   Substance and Sexual Activity    Alcohol use: No    Drug use: Never    Sexual activity: Not Currently     Review of Systems   Constitutional:  Positive for activity change and appetite change.   HENT:  Negative for congestion and dental problem.    Eyes:  Negative for discharge.   Respiratory:  Negative for apnea.    Cardiovascular:  Positive for chest pain.   Gastrointestinal:  Negative for abdominal distention.   Endocrine: Negative for cold intolerance and polydipsia.   Genitourinary:  Negative for difficulty urinating and enuresis.   Musculoskeletal:  Negative for arthralgias and back pain.   Skin:  Negative for color change and pallor.   Allergic/Immunologic: Negative for environmental allergies and food allergies.   Neurological:  Negative for dizziness.   Hematological:  Negative for adenopathy.   Psychiatric/Behavioral:  Negative for agitation and behavioral problems.      Objective:     Vital Signs (Most Recent):  Temp: 97.3 °F (36.3 °C) (09/10/24 0806)  Pulse: 89 (09/10/24 0948)  Resp: 18 (09/10/24 1109)  BP: 130/89 (09/10/24 0948)  SpO2: 95 % (09/10/24 0948) Vital Signs (24h Range):  Temp:  [97.3 °F (36.3 °C)] 97.3 °F (36.3 °C)  Pulse:  [82-89] 89  Resp:  [16-22] 18  SpO2:  [94 %-98 %] 95 %  BP: (125-151)/(79-91) 130/89     Weight: 62.6 kg (138 lb)  Body mass index is 20.98 kg/m².     Physical Exam  HENT:      Head: Normocephalic.      Right Ear: There is no  impacted cerumen.      Mouth/Throat:      Pharynx: No oropharyngeal exudate.   Eyes:      General:         Right eye: No discharge.   Cardiovascular:      Rate and Rhythm: Normal rate and regular rhythm.   Pulmonary:      Effort: No respiratory distress.      Breath sounds: No wheezing.   Abdominal:      General: There is no distension.      Tenderness: There is no abdominal tenderness.   Musculoskeletal:         General: No swelling or deformity.      Cervical back: Normal range of motion. No rigidity.   Skin:     General: Skin is warm.      Coloration: Skin is not jaundiced.      Findings: No bruising.   Neurological:      Mental Status: He is alert and oriented to person, place, and time.      Cranial Nerves: No cranial nerve deficit.      Motor: No weakness.   Psychiatric:         Mood and Affect: Mood normal.         Behavior: Behavior normal.                Significant Labs: All pertinent labs within the past 24 hours have been reviewed.  BMP:   Recent Labs   Lab 09/10/24  0826   *      K 3.1*   CL 99   CO2 27   BUN 9   CREATININE 0.9   CALCIUM 9.1     CBC:   Recent Labs   Lab 09/10/24  0826   WBC 7.49   HGB 14.2   HCT 40.3        CMP:   Recent Labs   Lab 09/10/24  0826      K 3.1*   CL 99   CO2 27   *   BUN 9   CREATININE 0.9   CALCIUM 9.1   PROT 7.1   ALBUMIN 3.4*   BILITOT 0.6   ALKPHOS 53*   AST 21   ALT 18   ANIONGAP 12     Troponin:   Recent Labs   Lab 09/10/24  0826 09/10/24  1137   TROPONINI <0.006 0.009       Significant Imaging: I have reviewed all pertinent imaging results/findings within the past 24 hours.

## 2024-09-10 NOTE — HPI
73-year-old male, hyperlipidemia, diabetes, no previous cardiac catheterization presents for 2-1/2 hours of chest tightness and pain. Patient states pain is nonpositional, is exertional. Patient does not know of a history of acute coronary syndrome. No diagnosis of angina pectoralis. Patient denies back pain. Duo to concern for unstable angina,cardiology was consulted and patient went to [facility].

## 2024-09-10 NOTE — ASSESSMENT & PLAN NOTE
"Patient's FSGs are controlled on current medication regimen.  Last A1c reviewed-   Lab Results   Component Value Date    HGBA1C 8.3 (H) 08/05/2024     Most recent fingerstick glucose reviewed- No results for input(s): "POCTGLUCOSE" in the last 24 hours.  Current correctional scale  Medium  Maintain anti-hyperglycemic dose as follows-   Antihyperglycemics (From admission, onward)      Start     Stop Route Frequency Ordered    09/10/24 1457  insulin aspart U-100 pen 0-10 Units         -- SubQ Before meals & nightly PRN 09/10/24 1358          Hold Oral hypoglycemics while patient is in the hospital.  "

## 2024-09-10 NOTE — ASSESSMENT & PLAN NOTE
Duo to concern for unstable angina,cardiology was consulted and patient went to [facility].

## 2024-09-10 NOTE — ASSESSMENT & PLAN NOTE
Per primary  Last A1c reviewed-   Lab Results   Component Value Date    HGBA1C 8.3 (H) 08/05/2024       Antihyperglycemics (From admission, onward)      None          Hold Oral hypoglycemics while patient is in the hospital.

## 2024-09-10 NOTE — CONSULTS
[place] - Emergency Dept  Cardiology  Consult Note    Patient Name: Emmanuel Grant  MRN: [id number]  Admission Date: 9/10/2024  Hospital Length of Stay: 0 days  Code Status: Prior   Attending Provider: Vipin Damon MD   Consulting Provider: Jemal Drummond MD  Primary Care Physician: Payam Vu Jr., NP  Principal Problem:<principal problem not specified>    Patient information was obtained from patient and ER records.     Inpatient consult to Cardiology  Consult performed by: Jemal Drummond MD  Consult ordered by: Speedy Gross MD        Subjective:

## 2024-09-10 NOTE — ASSESSMENT & PLAN NOTE
Known ischemic CM with LAD infarct by stress test 6/2024. Now with acute CP unrelieved in ER with NTG. Took ASA - patient would like to hold off on heparin or plavix load given Hx CVA > 10 years ago. He would like to proceed with LHC and is agreeable to anticoagulation if needed for PCI. Case discussed with Dr Ventura - I will perform LHC and he will do PCI if needed - Risks/benefits explained - consent on chart

## 2024-09-10 NOTE — Clinical Note
How Severe Are Your Spot(S)?: mild The catheter was removed from the ostium   left main. What Is The Reason For Today's Visit?: Full Body Skin Examination What Is The Reason For Today's Visit? (Being Monitored For X): concerning skin lesions on an annual basis

## 2024-09-10 NOTE — PROCEDURES
"Emmanuel Grant is a 73 y.o. male patient.    Temp: 97.3 °F (36.3 °C) (09/10/24 0806)  Pulse: 89 (09/10/24 0948)  Resp: 18 (09/10/24 1109)  BP: 130/89 (09/10/24 0948)  SpO2: 95 % (09/10/24 0948)  Weight: 62.6 kg (138 lb) (09/10/24 0806)  Height: 5' 8" (172.7 cm) (09/10/24 0806)       Procedures    [facility]   Dr Drummond  Pre-op Dx CP, CAD  Post-op Dx same  Specimen none  EBL < 50 cc    9/10/24 [facility] - EDP 17, LAD stent patent - luminal irregularities otherwise, D1  - stent alf - fills late - small vessel, Cx/RCA luminal irregularities  LFA access, right FA sheath in dissection    Non-obstructive CAD  Medical Rx  Angioseal left FA  Manual pressure right FA - in dissection  Ok for d/c after 6 hour bed rest  Continue ASA 81 qd    9/10/2024    "

## 2024-09-10 NOTE — OR NURSING
Dr. MACIEJ Hernandez stated that the client is able to go home by cab.     No need for any decision   He has right go home with cab  We do it all times   Milliones time   Please let gentleman goes   Whoever her like to go   He is free man  He has right to make own decision: Per Dr. Hernandez via Chat Message.

## 2024-09-10 NOTE — H&P
"  [facility]  History & Physical    Patient Name: Emmanuel Grant  MRN: [id number]  Patient Class: Emergency  Admission Date: 9/10/2024  Attending Physician: Ranjana Hernandez    Primary Care Provider: Payam Vu Jr., NP         Patient information was obtained from past medical records and ER records.     Subjective:     Principal Problem:Unstable angina    Chief Complaint:   Chief Complaint   Patient presents with    Chest Pain     Pt reports to ED for chest pain starting this morning, feels like a weight on middle of chest per pt. Hx of hypertension.          HPI: 73-year-old male, hyperlipidemia, diabetes, no previous cardiac catheterization presents for 2-1/2 hours of chest tightness and pain. Patient states pain is nonpositional, is exertional. Patient does not know of a history of acute coronary syndrome. No diagnosis of angina pectoralis. Patient denies back pain. Duo to concern for unstable angina,cardiology was consulted and patient went to [facility].    Past Medical History:   Diagnosis Date    CAD (coronary artery disease)     Sees [facility], h/o stent    Diabetes mellitus     Hypertension     Kidney stone     Stroke     age 62       Past Surgical History:   Procedure Laterality Date    CARDIAC CATHETERIZATION      with stent    COLONOSCOPY N/A 03/27/2024    Procedure: COLONOSCOPY;  Surgeon: Ariela Castro MD;  Location: [facility];  Service: Endoscopy;  Laterality: N/A;    ESOPHAGOGASTRODUODENOSCOPY N/A 03/27/2024    Procedure: EGD (ESOPHAGOGASTRODUODENOSCOPY);  Surgeon: Ariela Castro MD;  Location: [facility];  Service: Endoscopy;  Laterality: N/A;    SHOULDER SURGERY Right        Review of patient's allergies indicates:   Allergen Reactions    Dapagliflozin      Other reaction(s): Other (See Comments)    Pcn [penicillins]     Linagliptin Other (See Comments)     "it knocked me down", "it almost killed me"    Lisinopril Other (See Comments)     cough    " Pantoprazole Hives       No current facility-administered medications on file prior to encounter.     Current Outpatient Medications on File Prior to Encounter   Medication Sig    amLODIPine (NORVASC) 10 MG tablet Take 1 tablet (10 mg total) by mouth once daily.    aspirin 81 MG Chew Take 81 mg by mouth once daily.    atorvastatin (LIPITOR) 40 MG tablet Take 1 tablet (40 mg total) by mouth every evening.    carvediloL (COREG) 25 MG tablet Take 1 tablet (25 mg total) by mouth 2 (two) times daily with meals.    cholecalciferol, vitamin D3, (VITAMIN D3) 50 mcg (2,000 unit) Cap capsule 1 capsule.    furosemide (LASIX) 20 MG tablet Take 1 tablet (20 mg total) by mouth once daily.    metFORMIN (GLUCOPHAGE) 1000 MG tablet Take 1 tablet (1,000 mg total) by mouth 2 (two) times daily with meals.    albuterol-ipratropium (DUO-NEB) 2.5 mg-0.5 mg/3 mL nebulizer solution Take 3 mLs by nebulization every 6 (six) hours as needed for Wheezing. Rescue    alpha-D-galactosidase (BEANO ORAL) Take 1 tablet by mouth 2 (two) times a day.    blood sugar diagnostic Strp To check BG 1 times daily, to use with insurance preferred meter    blood sugar diagnostic Strp To check BG 3 times daily, to use with insurance preferred meter    blood-glucose meter kit To check BG 1 times daily, to use with insurance preferred meter    blood-glucose meter,continuous (FREESTYLE RAMBO 3 READER) [facility] USE WITH RAMBO 3 SENSORS    blood-glucose sensor (FREESTYLE RAMBO 3 SENSOR) Blanche Change every 14 days    fluticasone propionate (FLONASE) 50 mcg/actuation nasal spray 1 spray in each nostril Nasally Once a day    fluticasone propionate (FLONASE) 50 mcg/actuation nasal spray 1 spray (50 mcg total) by Each Nostril route once daily.    fluticasone-salmeterol diskus inhaler 250-50 mcg Inhale 1 puff into the lungs 2 (two) times daily. Controller    glimepiride (AMARYL) 2 MG tablet Take 2 mg by mouth 2 (two) times a day.    insulin lispro (HUMALOG KWIKPEN INSULIN) 100  "unit/mL pen INJECT AS NEEDED BEFORE MEALS: 150-200=+2, 201-250=+4; 251-300=+6; 301-350=+8, over 350=+10. MAX DAILY DOSE 30 UNITSunits    lancets Misc To check BG 1 times daily, to use with insurance preferred meter    lancets Misc To check BG 3 times daily, to use with insurance preferred meter    latanoprost 0.005 % ophthalmic solution Place 1 drop into both eyes every evening.    magnesium citrate solution Take half the bottle for constipation as needed.    montelukast (SINGULAIR) 10 mg tablet Take 10 mg by mouth every evening.    omeprazole (PRILOSEC) 40 MG capsule Take 1 capsule (40 mg total) by mouth once daily.    peg 400/hypromellose/glycerin (DRY EYE RELIEF OPHT) Apply to eye.    pen needle, diabetic (BD ULTRA-FINE HEATHER PEN NEEDLE) 32 gauge x 5/32" Ndle Use to inject 3 (three) times daily before meals.    polyethylene glycol (GLYCOLAX) 17 gram PwPk Take by mouth.    polyethylene glycol (GLYCOLAX) 17 gram/dose powder Take 17 g by mouth daily as needed (constipation).    sacubitriL-valsartan (ENTRESTO) 24-26 mg per tablet Take 1 tablet by mouth 2 (two) times daily.    senna-docusate 8.6-50 mg (SENNA WITH DOCUSATE SODIUM) 8.6-50 mg per tablet Take 1 tablet by mouth once daily.    sucralfate (CARAFATE) 1 gram tablet Take 1 g by mouth 4 (four) times daily.    tamsulosin (FLOMAX) 0.4 mg Cap Take 1 capsule (0.4 mg total) by mouth once daily.     Family History       Problem Relation (Age of Onset)    Cancer Mother    Colon cancer Sister          Tobacco Use    Smoking status: Never     Passive exposure: Never    Smokeless tobacco: Never   Substance and Sexual Activity    Alcohol use: No    Drug use: Never    Sexual activity: Not Currently     Review of Systems   Constitutional:  Positive for activity change and appetite change.   HENT:  Negative for congestion and dental problem.    Eyes:  Negative for discharge.   Respiratory:  Negative for apnea.    Cardiovascular:  Positive for chest pain.   Gastrointestinal:  " Negative for abdominal distention.   Endocrine: Negative for cold intolerance and polydipsia.   Genitourinary:  Negative for difficulty urinating and enuresis.   Musculoskeletal:  Negative for arthralgias and back pain.   Skin:  Negative for color change and pallor.   Allergic/Immunologic: Negative for environmental allergies and food allergies.   Neurological:  Negative for dizziness.   Hematological:  Negative for adenopathy.   Psychiatric/Behavioral:  Negative for agitation and behavioral problems.      Objective:     Vital Signs (Most Recent):  Temp: 97.3 °F (36.3 °C) (09/10/24 0806)  Pulse: 89 (09/10/24 0948)  Resp: 18 (09/10/24 1109)  BP: 130/89 (09/10/24 0948)  SpO2: 95 % (09/10/24 0948) Vital Signs (24h Range):  Temp:  [97.3 °F (36.3 °C)] 97.3 °F (36.3 °C)  Pulse:  [82-89] 89  Resp:  [16-22] 18  SpO2:  [94 %-98 %] 95 %  BP: (125-151)/(79-91) 130/89     Weight: 62.6 kg (138 lb)  Body mass index is 20.98 kg/m².     Physical Exam  HENT:      Head: Normocephalic.      Right Ear: There is no impacted cerumen.      Mouth/Throat:      Pharynx: No oropharyngeal exudate.   Eyes:      General:         Right eye: No discharge.   Cardiovascular:      Rate and Rhythm: Normal rate and regular rhythm.   Pulmonary:      Effort: No respiratory distress.      Breath sounds: No wheezing.   Abdominal:      General: There is no distension.      Tenderness: There is no abdominal tenderness.   Musculoskeletal:         General: No swelling or deformity.      Cervical back: Normal range of motion. No rigidity.   Skin:     General: Skin is warm.      Coloration: Skin is not jaundiced.      Findings: No bruising.   Neurological:      Mental Status: He is alert and oriented to person, place, and time.      Cranial Nerves: No cranial nerve deficit.      Motor: No weakness.   Psychiatric:         Mood and Affect: Mood normal.         Behavior: Behavior normal.                Significant Labs: All pertinent labs within the past 24 hours  have been reviewed.  BMP:   Recent Labs   Lab 09/10/24  0826   *      K 3.1*   CL 99   CO2 27   BUN 9   CREATININE 0.9   CALCIUM 9.1     CBC:   Recent Labs   Lab 09/10/24  0826   WBC 7.49   HGB 14.2   HCT 40.3        CMP:   Recent Labs   Lab 09/10/24  0826      K 3.1*   CL 99   CO2 27   *   BUN 9   CREATININE 0.9   CALCIUM 9.1   PROT 7.1   ALBUMIN 3.4*   BILITOT 0.6   ALKPHOS 53*   AST 21   ALT 18   ANIONGAP 12     Troponin:   Recent Labs   Lab 09/10/24  0826 09/10/24  1137   TROPONINI <0.006 0.009       Significant Imaging: I have reviewed all pertinent imaging results/findings within the past 24 hours.  Assessment/Plan:     * Unstable angina     Duo to concern for unstable angina,cardiology was consulted and patient went to [facility].    Chronic HFrEF (heart failure with reduced ejection fraction)  Will monitor.      Hyperlipidemia  On medical treatments       Essential hypertension  Chronic, controlled. Latest blood pressure and vitals reviewed-     Temp:  [97.3 °F (36.3 °C)]   Pulse:  [82-89]   Resp:  [16-22]   BP: (125-151)/(79-91)   SpO2:  [94 %-98 %] .   Home meds for hypertension were reviewed and noted below.   Hypertension Medications               amLODIPine (NORVASC) 10 MG tablet Take 1 tablet (10 mg total) by mouth once daily.    carvediloL (COREG) 25 MG tablet Take 1 tablet (25 mg total) by mouth 2 (two) times daily with meals.    furosemide (LASIX) 20 MG tablet Take 1 tablet (20 mg total) by mouth once daily.    sacubitriL-valsartan (ENTRESTO) 24-26 mg per tablet Take 1 tablet by mouth 2 (two) times daily.            While in the hospital, will manage blood pressure as follows; Continue home antihypertensive regimen    Will utilize p.r.n. blood pressure medication only if patient's blood pressure greater than 160/100 and he develops symptoms such as worsening chest pain or shortness of breath.    Type 2 diabetes mellitus with hyperglycemia  Patient's FSGs are controlled on  "current medication regimen.  Last A1c reviewed-   Lab Results   Component Value Date    HGBA1C 8.3 (H) 08/05/2024     Most recent fingerstick glucose reviewed- No results for input(s): "POCTGLUCOSE" in the last 24 hours.  Current correctional scale  Medium  Maintain anti-hyperglycemic dose as follows-   Antihyperglycemics (From admission, onward)      Start     Stop Route Frequency Ordered    09/10/24 1457  insulin aspart U-100 pen 0-10 Units         -- SubQ Before meals & nightly PRN 09/10/24 1358          Hold Oral hypoglycemics while patient is in the hospital.    BPH (benign prostatic hyperplasia)  On flomax        VTE Risk Mitigation (From admission, onward)      None                            Ranjana Hernandez MD  Department of Hospital Medicine  [facility]          "

## 2024-09-11 LAB
OHS QRS DURATION: 96 MS
OHS QTC CALCULATION: 437 MS

## 2024-09-12 ENCOUNTER — OFFICE VISIT (OUTPATIENT)
Dept: UROLOGY | Facility: CLINIC | Age: 74
End: 2024-09-12
Payer: MEDICARE

## 2024-09-12 VITALS — BODY MASS INDEX: 21.1 KG/M2 | HEIGHT: 68 IN | WEIGHT: 139.25 LBS

## 2024-09-12 DIAGNOSIS — N20.0 NEPHROLITHIASIS: ICD-10-CM

## 2024-09-12 DIAGNOSIS — N40.0 ENLARGED PROSTATE: ICD-10-CM

## 2024-09-12 DIAGNOSIS — N39.41 URGE INCONTINENCE: ICD-10-CM

## 2024-09-12 DIAGNOSIS — R35.1 NOCTURIA: Primary | ICD-10-CM

## 2024-09-12 PROCEDURE — 1160F RVW MEDS BY RX/DR IN RCRD: CPT | Mod: CPTII,S$GLB,, | Performed by: UROLOGY

## 2024-09-12 PROCEDURE — 3008F BODY MASS INDEX DOCD: CPT | Mod: CPTII,S$GLB,, | Performed by: UROLOGY

## 2024-09-12 PROCEDURE — 1101F PT FALLS ASSESS-DOCD LE1/YR: CPT | Mod: CPTII,S$GLB,, | Performed by: UROLOGY

## 2024-09-12 PROCEDURE — 3052F HG A1C>EQUAL 8.0%<EQUAL 9.0%: CPT | Mod: CPTII,S$GLB,, | Performed by: UROLOGY

## 2024-09-12 PROCEDURE — 3062F POS MACROALBUMINURIA REV: CPT | Mod: CPTII,S$GLB,, | Performed by: UROLOGY

## 2024-09-12 PROCEDURE — 99999 PR PBB SHADOW E&M-EST. PATIENT-LVL IV: CPT | Mod: PBBFAC,,, | Performed by: UROLOGY

## 2024-09-12 PROCEDURE — 99204 OFFICE O/P NEW MOD 45 MIN: CPT | Mod: S$GLB,,, | Performed by: UROLOGY

## 2024-09-12 PROCEDURE — 4010F ACE/ARB THERAPY RXD/TAKEN: CPT | Mod: CPTII,S$GLB,, | Performed by: UROLOGY

## 2024-09-12 PROCEDURE — 1159F MED LIST DOCD IN RCRD: CPT | Mod: CPTII,S$GLB,, | Performed by: UROLOGY

## 2024-09-12 PROCEDURE — 1126F AMNT PAIN NOTED NONE PRSNT: CPT | Mod: CPTII,S$GLB,, | Performed by: UROLOGY

## 2024-09-12 PROCEDURE — 3288F FALL RISK ASSESSMENT DOCD: CPT | Mod: CPTII,S$GLB,, | Performed by: UROLOGY

## 2024-09-12 PROCEDURE — 3066F NEPHROPATHY DOC TX: CPT | Mod: CPTII,S$GLB,, | Performed by: UROLOGY

## 2024-09-12 RX ORDER — MIRABEGRON 25 MG/1
25 TABLET, FILM COATED, EXTENDED RELEASE ORAL DAILY
Qty: 30 TABLET | Refills: 11 | Status: SHIPPED | OUTPATIENT
Start: 2024-09-12 | End: 2025-09-12

## 2024-09-12 NOTE — PROGRESS NOTES
"Subjective:       Emmanuel Grant is a 73 y.o. male who is a new patient who was seen  for evaluation of prostate issues.      Previously followed by Dr Messer, [facility] urology. Known h/o nephrolithiasis. He is s/p R ESWL 9/2022. Cystoscopy at that time showed high BN and bladder trabeculation. He subsequently had laser TURP in 11/2022. Flomax stopped post-op.     Returns now with further LUTS. Recently admitted to [facility] for CP and had cardiac cath (neg). On home O2.     He reports urgency and UUI x 3-4 months. Nocturia x 3-4. Stream is variable, can be strong. Denies straining. Denies ELBERT. Denies dysuria, hematuria. Flomax on MAR but he is not taking.     PVR (bladder scan) today - 0cc      The following portions of the patient's history were reviewed and updated as appropriate: allergies, current medications, past family history, past medical history, past social history, past surgical history and problem list.    Review of Systems  Twelve point review of systems completed. Pertinent positive and negatives listed in HPI.      Objective:    Vitals: Ht 5' 8" (1.727 m)   Wt 63.2 kg (139 lb 3.5 oz)   BMI 21.17 kg/m²     Physical Exam   General: well developed, well nourished in no acute distress  Head: normocephalic, atraumatic  Neck: no obvious enlargement of thyroid  HEENT: EOMI, mucus membranes moist, sclera anicteric, no hearing impairment  Lungs: symmetric expansion, on home O2  Neuro: alert and oriented x 3, no gross deficits  Psych: normal judgment and insight, normal mood/affect and non-anxious  Genitourinary: deferred   KAREEM: deferred      Lab Review   Urine analysis today in clinic shows +trace protein, +50 glucose     Lab Results   Component Value Date    WBC 7.49 09/10/2024    HGB 14.2 09/10/2024    HCT 40.3 09/10/2024    MCV 80 (L) 09/10/2024     09/10/2024     Lab Results   Component Value Date    CREATININE 0.9 09/10/2024    BUN 9 09/10/2024     No results found for: "PSA"  No results found for: " ""PSADIAG"    Imaging  CT reviewed 4/2024 - 7mm R renal stone vs dystrophic calcification       Assessment/Plan:      1. Nocturia    - Reduce PM fluids   - Trial Myrbetriq     2. Urge incontinence    - s/p TURP in 2022   - UUI: Behavioral changes, PFPT, anticholinergic medication, b-agonist medication. Botox/sacral neuromodulation for refractory UUI.    - Trial Myrbetriq 25mg. Discussed SE.    - Monitor PVR. Low now.      3. Enlarged prostate    - Prior TURP   - Consider adding Flomax back.    - Cystoscopy if not improving     4. Nephrolithiasis     - Prior ESWL 2022       Follow up in 2 mths with PVR         "

## 2024-09-17 ENCOUNTER — OFFICE VISIT (OUTPATIENT)
Dept: GASTROENTEROLOGY | Facility: CLINIC | Age: 74
End: 2024-09-17
Payer: MEDICARE

## 2024-09-17 VITALS
BODY MASS INDEX: 21.35 KG/M2 | SYSTOLIC BLOOD PRESSURE: 125 MMHG | WEIGHT: 140.88 LBS | HEART RATE: 98 BPM | DIASTOLIC BLOOD PRESSURE: 79 MMHG | HEIGHT: 68 IN

## 2024-09-17 DIAGNOSIS — K44.9 HIATAL HERNIA: ICD-10-CM

## 2024-09-17 DIAGNOSIS — K21.9 GASTROESOPHAGEAL REFLUX DISEASE WITHOUT ESOPHAGITIS: Primary | ICD-10-CM

## 2024-09-17 DIAGNOSIS — R10.13 DYSPEPSIA: ICD-10-CM

## 2024-09-17 PROCEDURE — 3074F SYST BP LT 130 MM HG: CPT | Mod: CPTII,S$GLB,,

## 2024-09-17 PROCEDURE — 3052F HG A1C>EQUAL 8.0%<EQUAL 9.0%: CPT | Mod: CPTII,S$GLB,,

## 2024-09-17 PROCEDURE — 3062F POS MACROALBUMINURIA REV: CPT | Mod: CPTII,S$GLB,,

## 2024-09-17 PROCEDURE — 99999 PR PBB SHADOW E&M-EST. PATIENT-LVL V: CPT | Mod: PBBFAC,,,

## 2024-09-17 PROCEDURE — 3008F BODY MASS INDEX DOCD: CPT | Mod: CPTII,S$GLB,,

## 2024-09-17 PROCEDURE — 1125F AMNT PAIN NOTED PAIN PRSNT: CPT | Mod: CPTII,S$GLB,,

## 2024-09-17 PROCEDURE — 1101F PT FALLS ASSESS-DOCD LE1/YR: CPT | Mod: CPTII,S$GLB,,

## 2024-09-17 PROCEDURE — 1159F MED LIST DOCD IN RCRD: CPT | Mod: CPTII,S$GLB,,

## 2024-09-17 PROCEDURE — 99214 OFFICE O/P EST MOD 30 MIN: CPT | Mod: S$GLB,,,

## 2024-09-17 PROCEDURE — 3066F NEPHROPATHY DOC TX: CPT | Mod: CPTII,S$GLB,,

## 2024-09-17 PROCEDURE — 1160F RVW MEDS BY RX/DR IN RCRD: CPT | Mod: CPTII,S$GLB,,

## 2024-09-17 PROCEDURE — 3078F DIAST BP <80 MM HG: CPT | Mod: CPTII,S$GLB,,

## 2024-09-17 PROCEDURE — 3288F FALL RISK ASSESSMENT DOCD: CPT | Mod: CPTII,S$GLB,,

## 2024-09-17 PROCEDURE — 4010F ACE/ARB THERAPY RXD/TAKEN: CPT | Mod: CPTII,S$GLB,,

## 2024-09-17 RX ORDER — FAMOTIDINE 40 MG/1
40 TABLET, FILM COATED ORAL NIGHTLY PRN
Qty: 30 TABLET | Refills: 11 | Status: SHIPPED | OUTPATIENT
Start: 2024-09-17 | End: 2025-09-17

## 2024-09-17 RX ORDER — SUCRALFATE 1 G/1
1 TABLET ORAL 4 TIMES DAILY
Qty: 120 TABLET | Refills: 11 | Status: SHIPPED | OUTPATIENT
Start: 2024-09-17 | End: 2025-09-17

## 2024-09-17 NOTE — PATIENT INSTRUCTIONS
GERD Treatment: Lifestyle and Dietary Changes    Dietary and lifestyle changes are the first step in treating GERD. Certain foods make the reflux worse. Suggestions to help alleviate symptoms include:  Lose weight if you are overweight -- of all of the lifestyle changes you can make, this one is the most effective.  Avoid foods that increase the level of acid in your stomach, including caffeinated beverages.  Avoid foods that decrease the pressure in the lower esophagus, such as fatty foods, alcohol and peppermint.  Avoid foods that affect peristalsis (the muscle movements in your digestive tract), such as coffee, alcohol and acidic liquids.  Avoid foods that slow gastric emptying, including fatty foods.  Avoid large meals.  Quit smoking.  Do not lie down immediately after a meal.  Elevate the level of your head when you lie down.    Foods That May Cause Heartburn  Foods commonly known to be heartburn triggers cause the esophageal sphincter to relax and delay the digestive process, letting food sit in the stomach longer, says Vishal. The worst culprits? Foods that are high in fat, salt or spice such as:  Fried food  Fast food  Pizza  Potato chips and other processed snacks  Chili powder and pepper (white, black, cayenne)  Fatty meats such as tian and sausage  Cheese  Other foods that can cause the same problem include:  Tomato-based sauces  Citrus fruits  Chocolate  Peppermint  Carbonated beverages    Moderation is key since many people may not be able to or want to completely eliminate these foods. But try to avoid eating problem foods late in the evening closer to bedtime, so they're not sitting in your stomach and then coming up your esophagus when you lay down at night. It's also a good idea to eat small frequent meals instead of bigger, heavier meals and avoid late-night dinners and bedtime snacks.    Foods That Help Prevent Acid Reflux  Good news: There are plenty of things you can eat to help prevent acid  reflux. Stock your kitchen with foods from these three categories:    High-fiber foods  Fibrous foods make you feel full so you're less likely to overeat, which may contribute to heartburn. So, load up on healthy fiber from these foods:  Whole grains such as oatmeal, couscous and brown rice.  Root vegetables such as sweet potatoes, carrots and beets.  Green vegetables such as asparagus, broccoli and green beans.    Alkaline foods  Foods fall somewhere along the pH scale (an indicator of acid levels). Those that have a low pH are acidic and more likely to cause reflux. Those with higher pH are alkaline and can help offset strong stomach acid. Alkaline foods include:  Bananas  Melons  Cauliflower  Fennel  Nuts    Watery foods  Eating foods that contain a lot of water can dilute and weaken stomach acid. Choose foods such as:  Celery  Cucumber  Lettuce  Watermelon  Broth-based soups  Herbal tea

## 2024-09-17 NOTE — PROGRESS NOTES
Gastroenterology Clinic Consultation Note    Reason for Visit:  The primary encounter diagnosis was Gastroesophageal reflux disease without esophagitis. A diagnosis of Hiatal hernia was also pertinent to this visit.    PCP:   Payam Vu Jr.   [address] [facility] [place] [id number]      Initial HPI   This is a 73 y.o. male presenting for GERD   Patient in clinic with complaints of ongoing reflux issues. He reports the reflux is not a new problem this has been going on many years. He has been worked up recently with an EGD in 2024 that showed a hiatal hernia. A normal gastric emptying study in 2023. Colonoscopy in 2024 that showed 2 polyps. Unremarkable CT abdomen in 2024. He is on home oxygen due to interstitial lung disease. He has been taking Prilosec 40 mg daily for reflux with little relief in symptoms. He reports when he sleeps in his recliner symptoms are better. He was last seen in March 2024 by Dr. Castro for similar complaints. He denies seeing general surgery for the hiatal hernia. He is not taking any OTC medications for reflux symptoms.     ROS:  Review of Systems   Constitutional:  Positive for malaise/fatigue. Negative for chills, fever and weight loss.   Respiratory:  Positive for shortness of breath.    Cardiovascular:  Negative for chest pain.   Gastrointestinal:  Positive for heartburn. Negative for abdominal pain, blood in stool, constipation, diarrhea, melena, nausea and vomiting.        Medical History:  has a past medical history of CAD (coronary artery disease), Diabetes mellitus, Hypertension, Kidney stone, and Stroke.    Surgical History:  has a past surgical history that includes Shoulder surgery (Right); Colonoscopy (N/A, 03/27/2024); Esophagogastroduodenoscopy (N/A, 03/27/2024); Cardiac catheterization; and Left heart catheterization (Left, 9/10/2024).    Family History: family history includes Cancer in his mother; Colon cancer in his sister..       Review of patient's  "allergies indicates:   Allergen Reactions    Dapagliflozin      Other reaction(s): Other (See Comments)    Pcn [penicillins]     Linagliptin Other (See Comments)     "it knocked me down", "it almost killed me"    Lisinopril Other (See Comments)     cough    Pantoprazole Hives       Current Outpatient Medications on File Prior to Visit   Medication Sig Dispense Refill    albuterol-ipratropium (DUO-NEB) 2.5 mg-0.5 mg/3 mL nebulizer solution Take 3 mLs by nebulization every 6 (six) hours as needed for Wheezing. Rescue 75 mL 0    alpha-D-galactosidase (BEANO ORAL) Take 1 tablet by mouth 2 (two) times a day.      amLODIPine (NORVASC) 10 MG tablet Take 1 tablet (10 mg total) by mouth once daily. 90 tablet 3    aspirin 81 MG Chew Take 81 mg by mouth once daily.      atorvastatin (LIPITOR) 40 MG tablet Take 1 tablet (40 mg total) by mouth every evening. 90 tablet 1    blood sugar diagnostic Strp To check BG 1 times daily, to use with insurance preferred meter 100 each 2    blood sugar diagnostic Strp To check BG 3 times daily, to use with insurance preferred meter 200 strip 5    blood-glucose meter kit To check BG 1 times daily, to use with insurance preferred meter 1 each 0    blood-glucose meter,continuous (FREESTYLE RAMBO 3 READER) [facility] [place] RAMBO 3 SENSORS 1 each 0    blood-glucose sensor (FREESTYLE RAMBO 3 SENSOR) Blanche Change every 14 days 2 each 11    carvediloL (COREG) 25 MG tablet Take 1 tablet (25 mg total) by mouth 2 (two) times daily with meals. 60 tablet 5    cholecalciferol, vitamin D3, (VITAMIN D3) 50 mcg (2,000 unit) Cap capsule 1 capsule.      fluticasone propionate (FLONASE) 50 mcg/actuation nasal spray 1 spray in each nostril Nasally Once a day      fluticasone propionate (FLONASE) 50 mcg/actuation nasal spray 1 spray (50 mcg total) by Each Nostril route once daily. 18.2 mL 0    fluticasone-salmeterol diskus inhaler 250-50 mcg Inhale 1 puff into the lungs 2 (two) times daily. Controller 60 each 6    " "furosemide (LASIX) 20 MG tablet Take 1 tablet (20 mg total) by mouth once daily. 90 tablet 3    glimepiride (AMARYL) 2 MG tablet Take 2 mg by mouth 2 (two) times a day.      insulin lispro (HUMALOG KWIKPEN INSULIN) 100 unit/mL pen INJECT AS NEEDED BEFORE MEALS: 150-200=+2, 201-250=+4; 251-300=+6; 301-350=+8, over 350=+10. MAX DAILY DOSE 30 UNITSunits 15 mL 0    lancets Misc To check BG 1 times daily, to use with insurance preferred meter 100 each 2    lancets Misc To check BG 3 times daily, to use with insurance preferred meter 200 each 5    latanoprost 0.005 % ophthalmic solution Place 1 drop into both eyes every evening.      magnesium citrate solution Take half the bottle for constipation as needed. 148 mL 0    metFORMIN (GLUCOPHAGE) 1000 MG tablet Take 1 tablet (1,000 mg total) by mouth 2 (two) times daily with meals.      mirabegron (MYRBETRIQ) 25 mg Tb24 ER tablet Take 1 tablet (25 mg total) by mouth once daily. 30 tablet 11    montelukast (SINGULAIR) 10 mg tablet Take 10 mg by mouth every evening.      omeprazole (PRILOSEC) 40 MG capsule Take 1 capsule (40 mg total) by mouth once daily. 90 capsule 3    peg 400/hypromellose/glycerin (DRY EYE RELIEF OPHT) Apply to eye.      pen needle, diabetic (BD ULTRA-FINE HEATHER PEN NEEDLE) 32 gauge x 5/32" Ndle Use to inject 3 (three) times daily before meals. 100 each 1    polyethylene glycol (GLYCOLAX) 17 gram PwPk Take by mouth.      polyethylene glycol (GLYCOLAX) 17 gram/dose powder Take 17 g by mouth daily as needed (constipation). 116 g 0    sacubitriL-valsartan (ENTRESTO) 24-26 mg per tablet Take 1 tablet by mouth 2 (two) times daily. 180 tablet 3    senna-docusate 8.6-50 mg (SENNA WITH DOCUSATE SODIUM) 8.6-50 mg per tablet Take 1 tablet by mouth once daily.      tamsulosin (FLOMAX) 0.4 mg Cap Take 1 capsule (0.4 mg total) by mouth once daily.      [DISCONTINUED] sucralfate (CARAFATE) 1 gram tablet Take 1 g by mouth 4 (four) times daily.       No current " "facility-administered medications on file prior to visit.         Objective Findings:    Vital Signs:  /79   Pulse 98   Ht 5' 8" (1.727 m)   Wt 63.9 kg (140 lb 14 oz)   BMI 21.42 kg/m²   Body mass index is 21.42 kg/m².    Physical Exam:  Physical Exam  Vitals and nursing note reviewed.   Constitutional:       General: He is not in acute distress.     Appearance: Normal appearance. He is normal weight. He is ill-appearing.      Interventions: Nasal cannula in place.      Comments: +home oxygen    HENT:      Head: Normocephalic and atraumatic.      Right Ear: External ear normal.      Left Ear: External ear normal.      Nose: Nose normal.   Eyes:      General: No scleral icterus.     Extraocular Movements: Extraocular movements intact.   Cardiovascular:      Rate and Rhythm: Normal rate.   Pulmonary:      Effort: No respiratory distress.   Abdominal:      General: There is no distension.      Palpations: Abdomen is soft.      Tenderness: There is no guarding.   Musculoskeletal:         General: Normal range of motion.      Cervical back: Normal range of motion.   Skin:     General: Skin is warm and dry.   Neurological:      Mental Status: He is alert and oriented to person, place, and time.   Psychiatric:         Mood and Affect: Mood normal.         Behavior: Behavior normal.         Thought Content: Thought content normal.             Labs:  Lab Results   Component Value Date    WBC 7.49 09/10/2024    HGB 14.2 09/10/2024    HCT 40.3 09/10/2024     09/10/2024    CRP 4.3 02/28/2024    CHOL 109 (L) 05/07/2024    TRIG 60 05/07/2024    HDL 39 (L) 05/07/2024    ALKPHOS 53 (L) 09/10/2024    LIPASE 54 07/15/2024    ALT 18 09/10/2024    AST 21 09/10/2024     09/10/2024    K 3.1 (L) 09/10/2024    CL 99 09/10/2024    CREATININE 0.9 09/10/2024    BUN 9 09/10/2024    CO2 27 09/10/2024    INR 1.2 07/07/2023    HGBA1C 8.3 (H) 08/05/2024       Imaging reviewed: reviewed       Endoscopy reviewed:   Findings: "       A hiatal hernia was present.        There is no endoscopic evidence of Ramirez's esophagus in the entire        esophagus.        The entire examined stomach was normal.        The examined duodenum was normal.   Impression:            - Hiatal hernia.                          - Normal stomach.                          - Normal examined duodenum.                          - No specimens collected.   Recommendation:        - Discharge patient to home.                          - Follow an antireflux regimen.   Ariela Castro MD   3/27/2024 11:42:51 AM     Findings:       Two sessile polyps were found in the ascending colon. The polyps        were 7 mm in size. These polyps were removed with a cold snare.        Resection and retrieval were complete.        The exam was otherwise without abnormality on direct and        retroflexion views.   Impression:            - Two 7 mm polyps in the ascending colon, removed                          with a cold snare. Resected and retrieved.                          - The examination was otherwise normal on direct                          and retroflexion views.   Recommendation:        - Discharge patient to home.                          - Repeat colonoscopy in 5 years for surveillance.   Ariela Castro MD   3/27/2024 11:50:32 AM       Ascending colon polyp, biopsy:  Tubular adenoma  Colonic mucosa with minimal hyperplastic change         Assessment:  1. Gastroesophageal reflux disease without esophagitis    2. Hiatal hernia      Orders Placed This Encounter    Ambulatory referral/consult to General Surgery    famotidine (PEPCID) 40 MG tablet    sucralfate (CARAFATE) 1 gram tablet     Plan:  Referral to general surgery for hiatal hernia  You may add Pepcid at night as needed for breakthrough symptoms  Continue Prilosec 40 mg daily in the AM   Take carafate as a slurry before every meal for dyspepsia       For GERD/Reflux:  Take your PPI 30-45 minutes before your  first protein containing meal (breakfast) every day. Take daily for 8 weeks. If symptoms improve ok discontinue an use PPI as needed for symptoms.   Take Pepcid 20 mg every evening before bedtime to help with nocturnal symptoms, as needed.  Remain upright for at least 3 hours after eating.   Elevate the head of the bed for nighttime.   Avoid foods that you have noticed make your symptoms worse (possible triggers include: peppermint, alcohol, chocolate, caffeine, spicy foods, greasy/fried foods, acidic foods-citrus).   Lose weight if you are overweight -- of all of the lifestyle changes you can make, this one is the most effective.  Follow up in 8 weeks with provider to assess symptoms and ability to taper off PPI (can be a virtual visit).        Thank you for allowing me to participate in this patient's care.    Sincerely,     DALIA SKAGGS, JENNIFER-C  Gastroenterology Department  [facility] Office [phone]

## 2024-09-19 ENCOUNTER — OFFICE VISIT (OUTPATIENT)
Dept: SURGERY | Facility: CLINIC | Age: 74
End: 2024-09-19
Payer: MEDICARE

## 2024-09-19 VITALS
HEART RATE: 90 BPM | BODY MASS INDEX: 21.35 KG/M2 | SYSTOLIC BLOOD PRESSURE: 146 MMHG | DIASTOLIC BLOOD PRESSURE: 91 MMHG | WEIGHT: 140.88 LBS | HEIGHT: 68 IN

## 2024-09-19 DIAGNOSIS — R10.13 DYSPEPSIA: ICD-10-CM

## 2024-09-19 DIAGNOSIS — I25.10 CORONARY ARTERY DISEASE INVOLVING NATIVE CORONARY ARTERY OF NATIVE HEART WITHOUT ANGINA PECTORIS: ICD-10-CM

## 2024-09-19 DIAGNOSIS — J84.9 INTERSTITIAL LUNG DISEASE: ICD-10-CM

## 2024-09-19 DIAGNOSIS — K44.9 HIATAL HERNIA: Primary | ICD-10-CM

## 2024-09-19 DIAGNOSIS — K21.9 GASTROESOPHAGEAL REFLUX DISEASE WITHOUT ESOPHAGITIS: ICD-10-CM

## 2024-09-19 PROCEDURE — 3077F SYST BP >= 140 MM HG: CPT | Mod: CPTII,S$GLB,, | Performed by: SURGERY

## 2024-09-19 PROCEDURE — 3288F FALL RISK ASSESSMENT DOCD: CPT | Mod: CPTII,S$GLB,, | Performed by: SURGERY

## 2024-09-19 PROCEDURE — 1126F AMNT PAIN NOTED NONE PRSNT: CPT | Mod: CPTII,S$GLB,, | Performed by: SURGERY

## 2024-09-19 PROCEDURE — 3062F POS MACROALBUMINURIA REV: CPT | Mod: CPTII,S$GLB,, | Performed by: SURGERY

## 2024-09-19 PROCEDURE — 3080F DIAST BP >= 90 MM HG: CPT | Mod: CPTII,S$GLB,, | Performed by: SURGERY

## 2024-09-19 PROCEDURE — 99204 OFFICE O/P NEW MOD 45 MIN: CPT | Mod: S$GLB,,, | Performed by: SURGERY

## 2024-09-19 PROCEDURE — 3052F HG A1C>EQUAL 8.0%<EQUAL 9.0%: CPT | Mod: CPTII,S$GLB,, | Performed by: SURGERY

## 2024-09-19 PROCEDURE — 99999 PR PBB SHADOW E&M-EST. PATIENT-LVL V: CPT | Mod: PBBFAC,,, | Performed by: SURGERY

## 2024-09-19 PROCEDURE — 3066F NEPHROPATHY DOC TX: CPT | Mod: CPTII,S$GLB,, | Performed by: SURGERY

## 2024-09-19 PROCEDURE — 3008F BODY MASS INDEX DOCD: CPT | Mod: CPTII,S$GLB,, | Performed by: SURGERY

## 2024-09-19 PROCEDURE — 4010F ACE/ARB THERAPY RXD/TAKEN: CPT | Mod: CPTII,S$GLB,, | Performed by: SURGERY

## 2024-09-19 PROCEDURE — 1101F PT FALLS ASSESS-DOCD LE1/YR: CPT | Mod: CPTII,S$GLB,, | Performed by: SURGERY

## 2024-09-19 NOTE — PROGRESS NOTES
General Surgery  Initial Consult Visit    Chief Complaint: GERD    HPI:  Patient is a 73 y.o. male, referred by Irish Benton, for evaluation of the above.    His greatest issue is acid brash throughout the day leading to coughing. Doesn't feel acid suppression medications help much but carafate before meals does help to prevent attacks of coughing and water brash after meals.     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 Never-smoker. Ambulatory for >200 yards but then some dyspnea requiring a few minutes to recover.    H/o CAD s/p stent 20y ago. Doing well. Had a recent LHC 9/10/2024 that demonstrated no occlusive disease and a patent stent. Also had a stroke around age 45/52 - had some LLE weakness that has mostly resolved.    Has had a recent EGD, reviewed. By my estimate of the images, has a 2-3cm hiatal hernia without esophagitis.    Endoscopy reviewed:   Findings:       A hiatal hernia was present.        There is no endoscopic evidence of Ramirez's esophagus in the entire esophagus.        The entire examined stomach was normal.        The examined duodenum was normal.   Impression:            - Hiatal hernia.                          - Normal stomach.                          - Normal examined duodenum.                          - No specimens collected.   Recommendation:        - Discharge patient to home.                          - Follow an antireflux regimen.   Ariela Castro MD   3/27/2024 11:42:51 AM       GERD Severity Questionnaire     PPI, H2 Blocker or TUMS: Yes; Famotidine qHS; Protonix qD; carafate AC & hS.   Typical heartburn: Rarely; a few / month  Regurgitation: None  Dysphagia solids: None  Dysphagia liquids: None  Chest Pain / Pressure with eating / after meals: None  Hoarseness: A few times / week  Sore throat: None  Wake up coughing or choking: None  Asthma-like cough: Daily  Water brash: Many times / day, affects daily  "life  Globus / Frequent throat clearing: Daily  Nausea: None  Vomiting: None      No prior abdominal surgeries.    Medical History:  Past Medical History:   Diagnosis Date    CAD (coronary artery disease)     Sees [facility], h/o stent    Diabetes mellitus     Hypertension     Kidney stone     Stroke     age 62     Surgical History:  Past Surgical History:   Procedure Laterality Date    CARDIAC CATHETERIZATION      with stent    COLONOSCOPY N/A 03/27/2024    Procedure: COLONOSCOPY;  Surgeon: Ariela Castro MD;  Location: [facility] ENDO;  Service: Endoscopy;  Laterality: N/A;    ESOPHAGOGASTRODUODENOSCOPY N/A 03/27/2024    Procedure: EGD (ESOPHAGOGASTRODUODENOSCOPY);  Surgeon: Ariela Castro MD;  Location: [facility] ENDO;  Service: Endoscopy;  Laterality: N/A;    LEFT HEART CATHETERIZATION Left 9/10/2024    Procedure: Left heart cath;  Surgeon: Jemal Drummond MD;  Location: [facility] CATH LAB;  Service: Cardiology;  Laterality: Left;    SHOULDER SURGERY Right      Social History:  Social History     Tobacco Use    Smoking status: Never     Passive exposure: Never    Smokeless tobacco: Never   Substance Use Topics    Alcohol use: No    Drug use: Never       Review of Systems:  See HPI for pertinent positives and negatives.    Physical Exam:  BP (!) 146/91   Pulse 90   Ht 5' 8" (1.727 m)   Wt 63.9 kg (140 lb 14 oz)   BMI 21.42 kg/m²   Physical Exam  Vitals reviewed.   Constitutional:       General: He is not in acute distress.     Appearance: He is normal weight.   Cardiovascular:      Rate and Rhythm: Normal rate.   Pulmonary:      Effort: Pulmonary effort is normal. No respiratory distress.      Comments: On 2LPM nasal cannula  No accessory muscle use.  Abdominal:      Palpations: Abdomen is soft.      Tenderness: There is no abdominal tenderness.   Neurological:      General: No focal deficit present.      Mental Status: He is alert and oriented to person, place, and time.   Psychiatric:         Mood and Affect: " Mood normal.         Behavior: Behavior normal.         Data Review:  Recent GI note reviewed  EGD reviewed  CT chest reviewed - has a small sliding hiatal hernia      Assessment/Plan:  Emmanuel was seen today for gastroesophageal reflux.    Diagnoses and all orders for this visit:    Hiatal hernia  -     Ambulatory referral/consult to General Surgery    Gastroesophageal reflux disease without esophagitis  -     Ambulatory referral/consult to General Surgery    Dyspepsia  -     Ambulatory referral/consult to General Surgery      Emmanuel is a 73 y.o. male with GERD, a small hiatal hernia and chronic O2 use from ILD. H/o CAD but a normal LHC 9 days ago. Is on ASA 81mg but no plavix or other blood thinners.  We discussed his anatomy in detail as well as the potential surgery - HH repair with toupet fundoplication.  Discussed smaller, more frequent meals; spacing liquids and solids; not eating within a few hours of lying down and sleeping propped up if necessary.  Continue acid suppression.    -UGI for anatomy  -Pulmonology eval, optimization and clearance  -Cards risk strat given h/o stroke and CAD  -Impedence pH probe testing for objective evidence of reflux and symptom correlation, since EGD demonstrated no stigmata.  -RTC after the above    Sintia Juarez MD FACS  Minimally Invasive General & Bariatric Surgery  [facility] [place], [place]

## 2024-09-23 ENCOUNTER — OFFICE VISIT (OUTPATIENT)
Dept: PULMONOLOGY | Facility: CLINIC | Age: 74
End: 2024-09-23
Payer: MEDICARE

## 2024-09-23 ENCOUNTER — HOSPITAL ENCOUNTER (OUTPATIENT)
Dept: RADIOLOGY | Facility: HOSPITAL | Age: 74
Discharge: HOME | End: 2024-09-23
Attending: SURGERY
Payer: MEDICARE

## 2024-09-23 VITALS
DIASTOLIC BLOOD PRESSURE: 84 MMHG | HEIGHT: 68 IN | WEIGHT: 140 LBS | SYSTOLIC BLOOD PRESSURE: 140 MMHG | HEART RATE: 86 BPM | BODY MASS INDEX: 21.22 KG/M2 | OXYGEN SATURATION: 95 %

## 2024-09-23 DIAGNOSIS — J84.9 INTERSTITIAL LUNG DISEASE: ICD-10-CM

## 2024-09-23 DIAGNOSIS — J96.11 CHRONIC HYPOXEMIC RESPIRATORY FAILURE: ICD-10-CM

## 2024-09-23 DIAGNOSIS — K21.9 GASTROESOPHAGEAL REFLUX DISEASE, UNSPECIFIED WHETHER ESOPHAGITIS PRESENT: Primary | ICD-10-CM

## 2024-09-23 DIAGNOSIS — K44.9 HIATAL HERNIA: ICD-10-CM

## 2024-09-23 PROCEDURE — 25500020 PHARM REV CODE 255: Performed by: SURGERY

## 2024-09-23 PROCEDURE — 3052F HG A1C>EQUAL 8.0%<EQUAL 9.0%: CPT | Mod: CPTII,S$GLB,, | Performed by: INTERNAL MEDICINE

## 2024-09-23 PROCEDURE — 3079F DIAST BP 80-89 MM HG: CPT | Mod: CPTII,S$GLB,, | Performed by: INTERNAL MEDICINE

## 2024-09-23 PROCEDURE — 3008F BODY MASS INDEX DOCD: CPT | Mod: CPTII,S$GLB,, | Performed by: INTERNAL MEDICINE

## 2024-09-23 PROCEDURE — 3066F NEPHROPATHY DOC TX: CPT | Mod: CPTII,S$GLB,, | Performed by: INTERNAL MEDICINE

## 2024-09-23 PROCEDURE — 74240 X-RAY XM UPR GI TRC 1CNTRST: CPT | Mod: TC,FY

## 2024-09-23 PROCEDURE — 74240 X-RAY XM UPR GI TRC 1CNTRST: CPT | Mod: 26,,, | Performed by: RADIOLOGY

## 2024-09-23 PROCEDURE — 3077F SYST BP >= 140 MM HG: CPT | Mod: CPTII,S$GLB,, | Performed by: INTERNAL MEDICINE

## 2024-09-23 PROCEDURE — 99214 OFFICE O/P EST MOD 30 MIN: CPT | Mod: S$GLB,,, | Performed by: INTERNAL MEDICINE

## 2024-09-23 PROCEDURE — 4010F ACE/ARB THERAPY RXD/TAKEN: CPT | Mod: CPTII,S$GLB,, | Performed by: INTERNAL MEDICINE

## 2024-09-23 PROCEDURE — A9698 NON-RAD CONTRAST MATERIALNOC: HCPCS | Performed by: SURGERY

## 2024-09-23 PROCEDURE — 1101F PT FALLS ASSESS-DOCD LE1/YR: CPT | Mod: CPTII,S$GLB,, | Performed by: INTERNAL MEDICINE

## 2024-09-23 PROCEDURE — 1159F MED LIST DOCD IN RCRD: CPT | Mod: CPTII,S$GLB,, | Performed by: INTERNAL MEDICINE

## 2024-09-23 PROCEDURE — 3288F FALL RISK ASSESSMENT DOCD: CPT | Mod: CPTII,S$GLB,, | Performed by: INTERNAL MEDICINE

## 2024-09-23 PROCEDURE — 99999 PR PBB SHADOW E&M-EST. PATIENT-LVL V: CPT | Mod: PBBFAC,,, | Performed by: INTERNAL MEDICINE

## 2024-09-23 PROCEDURE — 3062F POS MACROALBUMINURIA REV: CPT | Mod: CPTII,S$GLB,, | Performed by: INTERNAL MEDICINE

## 2024-09-23 RX ORDER — BENZONATATE 200 MG/1
200 CAPSULE ORAL 3 TIMES DAILY PRN
Qty: 90 CAPSULE | Refills: 1 | Status: SHIPPED | OUTPATIENT
Start: 2024-09-23 | End: 2024-09-25 | Stop reason: CLARIF

## 2024-09-23 RX ORDER — CODEINE PHOSPHATE AND GUAIFENESIN 10; 100 MG/5ML; MG/5ML
5 SOLUTION ORAL NIGHTLY PRN
Qty: 237 ML | Refills: 0 | Status: SHIPPED | OUTPATIENT
Start: 2024-09-23 | End: 2024-09-25 | Stop reason: CLARIF

## 2024-09-23 RX ORDER — IPRATROPIUM BROMIDE AND ALBUTEROL SULFATE 2.5; .5 MG/3ML; MG/3ML
3 SOLUTION RESPIRATORY (INHALATION) EVERY 6 HOURS PRN
Qty: 75 ML | Refills: 11 | Status: SHIPPED | OUTPATIENT
Start: 2024-09-23 | End: 2025-09-23

## 2024-09-23 RX ORDER — MYCOPHENOLATE MOFETIL 500 MG/1
TABLET ORAL
Qty: 70 TABLET | Refills: 0 | Status: SHIPPED | OUTPATIENT
Start: 2024-09-23 | End: 2024-09-25 | Stop reason: CLARIF

## 2024-09-23 RX ADMIN — BARIUM SULFATE 50 ML: 0.6 SUSPENSION ORAL at 09:09

## 2024-09-23 NOTE — ASSESSMENT & PLAN NOTE
Responded well to the steroid taper and is breathing easier.  Starting Cellcept and will monitor labs - recent CBC and CMP normal   Weekly labs for the first month, monthly for the first 6 months, and then every 6 months   Adding cough medication to assist with his symptoms

## 2024-09-23 NOTE — ASSESSMENT & PLAN NOTE
Following with GI and on aggressive treatment with plans for possible surgery in the future. He is above average risk for surgery due to his ILD and hypoxia at baseline.

## 2024-09-23 NOTE — PROGRESS NOTES
"Reason for visit:  ILD    Patient ID:  Emmanuel Grant is a 73 y.o. male    Interval History 9/23/24  Mr. Grant is feeling much better since completing the steroid taper. He is still short of breath and having coughing spells but overall feels better. He is also following with GI and possible having surgery to correct his hiatal hernia and improve his GERD symptoms.     Interval History   Since our last visit he has continued to have significant shortness of breath.  He unfortunately has also had severe acid reflux and stomach pains that are causing him significant discomfort and making his shortness of breath worse.  He has not really noted any improvement since starting inhalers or any of our last treatment.  He does not see GI again until August 10th.  But he is getting progressively worse.     History:  Mr. Grant  is a 73-year-old with newly diagnosed interstitial lung disease he has been admitted at the [facility] twice for shortness of breath and was sent home this last time with oxygen.  He had initial autoimmune evaluation that has been negative thus far.  No history of exposure to medications that would cause this that he is aware of, no family history of lung disease.  No previous heart disease.  He does get short of breath with exertion and moving around a good bit but is mostly able to do what he wants to do throughout the day.  He does use a nebulizer he feels that it does seem to help some.    Additional Pulmonary History:  Childhood Illnesses:  none  Occupational:   works in air conditioning in installation and repair  Environmental:   no pets, no seasonal allergies, no carpet in his home and no concern for mold or allergy exposures there  Tobacco/Smoking:   never smoker    Objective:     Vitals:    09/23/24 1301   BP: (!) 140/84   BP Location: Left arm   Patient Position: Sitting   Pulse: 86   SpO2: 95%  Comment: 2.0   Weight: 63.5 kg (139 lb 15.9 oz)   Height: 5' 8" (1.727 m)         Physical " Exam  Constitutional:       General: He is not in acute distress.     Appearance: He is not diaphoretic.   HENT:      Head: Normocephalic and atraumatic.      Right Ear: External ear normal.      Left Ear: External ear normal.   Eyes:      Conjunctiva/sclera: Conjunctivae normal.      Pupils: Pupils are equal, round, and reactive to light.   Neck:      Trachea: No tracheal deviation.   Cardiovascular:      Rate and Rhythm: Normal rate and regular rhythm.      Heart sounds: Normal heart sounds. No murmur heard.  Pulmonary:      Effort: Pulmonary effort is normal. No respiratory distress.      Breath sounds: No stridor. Rales present. No wheezing.   Abdominal:      General: Bowel sounds are normal. There is no distension.      Palpations: Abdomen is soft.      Tenderness: There is no abdominal tenderness.   Musculoskeletal:         General: Normal range of motion.      Cervical back: Normal range of motion and neck supple.   Skin:     General: Skin is warm and dry.      Findings: No erythema.   Neurological:      Mental Status: He is alert and oriented to person, place, and time.      Gait: Gait is intact.   Psychiatric:         Mood and Affect: Mood and affect normal.         Cognition and Memory: Memory normal.         Judgment: Judgment normal.          Personal Diagnostic Review and Interpretation   CT scans reviewed from the last 2 occurrences and do show in her lobular septal thick getting no evidence of clear UIP pattern at this time we will repeat in a few months      Pertinent Studies Reviewed & Interpreted:     Pulmonary Function Tests:   Pending     Echocardiograms:   Results for orders placed during the hospital encounter of 07/02/24    Echo    Interpretation Summary    Left Ventricle: The left ventricle is normal in size. Normal wall thickness. Regional wall motion abnormalities and Global hypokinesis present. See diagram for wall motion findings. There is mildly reduced systolic function with a visually  estimated ejection fraction of 40 - 45%. Biplane (2D) method of discs ejection fraction is 43%. Global longitudinal strain is -13.0%.    Right Ventricle: Normal right ventricular cavity size. Wall thickness is normal. Systolic function is normal.    Aortic Valve: The aortic valve is a trileaflet valve. There is mild aortic valve sclerosis.    Mitral Valve: There is mild bileaflet sclerosis. There is mild mitral annular calcification present.    Pulmonic Valve: There is mild regurgitation.    Pulmonary Artery: The estimated pulmonary artery systolic pressure is 24 mmHg.    IVC/SVC: Normal venous pressure at 3 mmHg.          Assessment & Plan:       Problem List Items Addressed This Visit          Pulmonary    Interstitial lung disease    Current Assessment & Plan     Responded well to the steroid taper and is breathing easier.  Starting Cellcept and will monitor labs - recent CBC and CMP normal   Weekly labs for the first month, monthly for the first 6 months, and then every 6 months   Adding cough medication to assist with his symptoms           Relevant Medications    albuterol-ipratropium (DUO-NEB) 2.5 mg-0.5 mg/3 mL nebulizer solution    benzonatate (TESSALON) 200 MG capsule    mycophenolate (CELLCEPT) 500 mg Tab    Other Relevant Orders    CBC auto differential    Comprehensive Metabolic Panel    Chronic hypoxemic respiratory failure    Overview     Oxygen therapy 24/7 now, continue 2 L oxygen therapy.         Relevant Medications    albuterol-ipratropium (DUO-NEB) 2.5 mg-0.5 mg/3 mL nebulizer solution       GI    GERD (gastroesophageal reflux disease) - Primary    Current Assessment & Plan     Following with GI and on aggressive treatment with plans for possible surgery in the future. He is above average risk for surgery due to his ILD and hypoxia at baseline.              RETURN TO CLINIC IN 3 MONTHS       Portions of the record may have been created with voice-recognition software. Occasional wrong-word or  sound-a-like substitutions may have occurred due to the inherent limitations of voice-recognition software. Read the chart carefully and recognize, using context, where substitutions have occurred.    Naheed Schmitt M.D.  Pulmonary/Critical Care

## 2024-09-24 ENCOUNTER — HOSPITAL ENCOUNTER (EMERGENCY)
Facility: HOSPITAL | Age: 74
Discharge: HOME | End: 2024-09-24
Attending: STUDENT IN AN ORGANIZED HEALTH CARE EDUCATION/TRAINING PROGRAM
Payer: MEDICARE

## 2024-09-24 ENCOUNTER — NURSE TRIAGE (OUTPATIENT)
Dept: ADMINISTRATIVE | Facility: CLINIC | Age: 74
End: 2024-09-24
Payer: MEDICARE

## 2024-09-24 VITALS
TEMPERATURE: 98 F | BODY MASS INDEX: 21.98 KG/M2 | RESPIRATION RATE: 16 BRPM | OXYGEN SATURATION: 95 % | WEIGHT: 145 LBS | HEIGHT: 68 IN | DIASTOLIC BLOOD PRESSURE: 92 MMHG | HEART RATE: 80 BPM | SYSTOLIC BLOOD PRESSURE: 147 MMHG

## 2024-09-24 DIAGNOSIS — R07.9 CHEST PAIN: ICD-10-CM

## 2024-09-24 LAB
ALBUMIN SERPL BCP-MCNC: 3.5 G/DL (ref 3.5–5.2)
ALP SERPL-CCNC: 63 U/L (ref 55–135)
ALT SERPL W/O P-5'-P-CCNC: 18 U/L (ref 10–44)
ANION GAP SERPL CALC-SCNC: 13 MMOL/L (ref 8–16)
AST SERPL-CCNC: 21 U/L (ref 10–40)
BASOPHILS # BLD AUTO: 0.02 K/UL (ref 0–0.2)
BASOPHILS NFR BLD: 0.2 % (ref 0–1.9)
BILIRUB SERPL-MCNC: 0.5 MG/DL (ref 0.1–1)
BILIRUB UR QL STRIP: NEGATIVE
BNP SERPL-MCNC: 56 PG/ML (ref 0–99)
BUN SERPL-MCNC: 11 MG/DL (ref 8–23)
CALCIUM SERPL-MCNC: 9.8 MG/DL (ref 8.7–10.5)
CHLORIDE SERPL-SCNC: 103 MMOL/L (ref 95–110)
CLARITY UR: CLEAR
CO2 SERPL-SCNC: 22 MMOL/L (ref 23–29)
COLOR UR: COLORLESS
CREAT SERPL-MCNC: 1.1 MG/DL (ref 0.5–1.4)
DIFFERENTIAL METHOD BLD: ABNORMAL
EOSINOPHIL # BLD AUTO: 0.7 K/UL (ref 0–0.5)
EOSINOPHIL NFR BLD: 8.3 % (ref 0–8)
ERYTHROCYTE [DISTWIDTH] IN BLOOD BY AUTOMATED COUNT: 13.5 % (ref 11.5–14.5)
EST. GFR  (NO RACE VARIABLE): >60 ML/MIN/1.73 M^2
GLUCOSE SERPL-MCNC: 162 MG/DL (ref 70–110)
GLUCOSE UR QL STRIP: NEGATIVE
HCT VFR BLD AUTO: 39.3 % (ref 40–54)
HGB BLD-MCNC: 13.2 G/DL (ref 14–18)
HGB UR QL STRIP: NEGATIVE
IMM GRANULOCYTES # BLD AUTO: 0.02 K/UL (ref 0–0.04)
IMM GRANULOCYTES NFR BLD AUTO: 0.2 % (ref 0–0.5)
KETONES UR QL STRIP: NEGATIVE
LEUKOCYTE ESTERASE UR QL STRIP: NEGATIVE
LYMPHOCYTES # BLD AUTO: 1.5 K/UL (ref 1–4.8)
LYMPHOCYTES NFR BLD: 18.2 % (ref 18–48)
MCH RBC QN AUTO: 28.2 PG (ref 27–31)
MCHC RBC AUTO-ENTMCNC: 33.6 G/DL (ref 32–36)
MCV RBC AUTO: 84 FL (ref 82–98)
MONOCYTES # BLD AUTO: 0.6 K/UL (ref 0.3–1)
MONOCYTES NFR BLD: 7.6 % (ref 4–15)
NEUTROPHILS # BLD AUTO: 5.5 K/UL (ref 1.8–7.7)
NEUTROPHILS NFR BLD: 65.5 % (ref 38–73)
NITRITE UR QL STRIP: NEGATIVE
NRBC BLD-RTO: 0 /100 WBC
PH UR STRIP: 6 [PH] (ref 5–8)
PLATELET # BLD AUTO: 223 K/UL (ref 150–450)
PLATELET BLD QL SMEAR: ABNORMAL
PMV BLD AUTO: 9.6 FL (ref 9.2–12.9)
POCT GLUCOSE: 159 MG/DL (ref 70–110)
POTASSIUM SERPL-SCNC: 4.5 MMOL/L (ref 3.5–5.1)
PROT SERPL-MCNC: 7.2 G/DL (ref 6–8.4)
PROT UR QL STRIP: NEGATIVE
RBC # BLD AUTO: 4.68 M/UL (ref 4.6–6.2)
SODIUM SERPL-SCNC: 138 MMOL/L (ref 136–145)
SP GR UR STRIP: 1 (ref 1–1.03)
TROPONIN I SERPL DL<=0.01 NG/ML-MCNC: <0.006 NG/ML (ref 0–0.03)
TROPONIN I SERPL DL<=0.01 NG/ML-MCNC: <0.006 NG/ML (ref 0–0.03)
URN SPEC COLLECT METH UR: ABNORMAL
UROBILINOGEN UR STRIP-ACNC: NEGATIVE EU/DL
WBC # BLD AUTO: 8.34 K/UL (ref 3.9–12.7)

## 2024-09-24 PROCEDURE — 84484 ASSAY OF TROPONIN QUANT: CPT | Mod: 91 | Performed by: NURSE PRACTITIONER

## 2024-09-24 PROCEDURE — 82962 GLUCOSE BLOOD TEST: CPT

## 2024-09-24 PROCEDURE — 63600175 PHARM REV CODE 636 W HCPCS: Performed by: STUDENT IN AN ORGANIZED HEALTH CARE EDUCATION/TRAINING PROGRAM

## 2024-09-24 PROCEDURE — 85025 COMPLETE CBC W/AUTO DIFF WBC: CPT | Performed by: NURSE PRACTITIONER

## 2024-09-24 PROCEDURE — 25000003 PHARM REV CODE 250: Performed by: STUDENT IN AN ORGANIZED HEALTH CARE EDUCATION/TRAINING PROGRAM

## 2024-09-24 PROCEDURE — 81003 URINALYSIS AUTO W/O SCOPE: CPT | Performed by: NURSE PRACTITIONER

## 2024-09-24 PROCEDURE — 80053 COMPREHEN METABOLIC PANEL: CPT | Performed by: NURSE PRACTITIONER

## 2024-09-24 PROCEDURE — 99285 EMERGENCY DEPT VISIT HI MDM: CPT | Mod: 25

## 2024-09-24 PROCEDURE — 96360 HYDRATION IV INFUSION INIT: CPT

## 2024-09-24 PROCEDURE — 83880 ASSAY OF NATRIURETIC PEPTIDE: CPT | Performed by: NURSE PRACTITIONER

## 2024-09-24 PROCEDURE — 93005 ELECTROCARDIOGRAM TRACING: CPT

## 2024-09-24 PROCEDURE — 93010 ELECTROCARDIOGRAM REPORT: CPT | Mod: ,,, | Performed by: INTERNAL MEDICINE

## 2024-09-24 RX ORDER — LIDOCAINE HYDROCHLORIDE 20 MG/ML
15 SOLUTION OROPHARYNGEAL ONCE
Status: COMPLETED | OUTPATIENT
Start: 2024-09-24 | End: 2024-09-24

## 2024-09-24 RX ORDER — NAPROXEN SODIUM 220 MG/1
243 TABLET, FILM COATED ORAL
Status: COMPLETED | OUTPATIENT
Start: 2024-09-24 | End: 2024-09-24

## 2024-09-24 RX ORDER — ALUMINUM HYDROXIDE, MAGNESIUM HYDROXIDE, AND SIMETHICONE 1200; 120; 1200 MG/30ML; MG/30ML; MG/30ML
30 SUSPENSION ORAL ONCE
Status: COMPLETED | OUTPATIENT
Start: 2024-09-24 | End: 2024-09-24

## 2024-09-24 RX ADMIN — SODIUM CHLORIDE, POTASSIUM CHLORIDE, SODIUM LACTATE AND CALCIUM CHLORIDE 500 ML: 600; 310; 30; 20 INJECTION, SOLUTION INTRAVENOUS at 08:09

## 2024-09-24 RX ADMIN — ASPIRIN 81 MG CHEWABLE TABLET 243 MG: 81 TABLET CHEWABLE at 08:09

## 2024-09-24 RX ADMIN — ALUMINUM HYDROXIDE, MAGNESIUM HYDROXIDE, AND SIMETHICONE 30 ML: 1200; 120; 1200 SUSPENSION ORAL at 08:09

## 2024-09-24 RX ADMIN — LIDOCAINE HYDROCHLORIDE 15 ML: 20 SOLUTION ORAL at 08:09

## 2024-09-24 NOTE — TELEPHONE ENCOUNTER
Pt reports he went grocery shopping, had to walk in the heat and the vehicle did not have good air condition, pt states he feels bad, disoriented like he could pass out, does have heart and lung issues, previous heart attack and stroke. Pt advised to call 911 per protocol, states he is alone and doesn't feel like he can call on his own. Co-worker Janis Newman called 911 while this nurse stayed on the line with the pt until the 's dept arrived.     Reason for Disposition   Acting confused (e.g., disoriented, slurred speech)    Additional Information   Negative: Unconscious or difficult to awaken    Protocols used: Heat Exposure (Heat Exhaustion and Heat Stroke)-A-

## 2024-09-25 ENCOUNTER — PATIENT OUTREACH (OUTPATIENT)
Dept: ADMINISTRATIVE | Facility: OTHER | Age: 74
End: 2024-09-25
Payer: MEDICARE

## 2024-09-25 ENCOUNTER — HOSPITAL ENCOUNTER (INPATIENT)
Facility: HOSPITAL | Age: 74
LOS: 3 days | Discharge: HOME | DRG: 189 | End: 2024-09-28
Attending: EMERGENCY MEDICINE | Admitting: INTERNAL MEDICINE
Payer: MEDICARE

## 2024-09-25 ENCOUNTER — OFFICE VISIT (OUTPATIENT)
Dept: URGENT CARE | Facility: CLINIC | Age: 74
End: 2024-09-25
Payer: MEDICARE

## 2024-09-25 VITALS — OXYGEN SATURATION: 98 % | HEART RATE: 105 BPM | DIASTOLIC BLOOD PRESSURE: 94 MMHG | SYSTOLIC BLOOD PRESSURE: 146 MMHG

## 2024-09-25 DIAGNOSIS — R79.89 ELEVATED D-DIMER: ICD-10-CM

## 2024-09-25 DIAGNOSIS — J84.9 INTERSTITIAL LUNG DISEASE: Primary | ICD-10-CM

## 2024-09-25 DIAGNOSIS — R07.9 CHEST PAIN: ICD-10-CM

## 2024-09-25 DIAGNOSIS — R06.02 SOB (SHORTNESS OF BREATH): Primary | ICD-10-CM

## 2024-09-25 DIAGNOSIS — R06.02 SOB (SHORTNESS OF BREATH): ICD-10-CM

## 2024-09-25 DIAGNOSIS — R09.02 HYPOXIA: ICD-10-CM

## 2024-09-25 DIAGNOSIS — R07.9 CHEST PAIN, UNSPECIFIED TYPE: ICD-10-CM

## 2024-09-25 PROBLEM — J96.21 ACUTE ON CHRONIC RESPIRATORY FAILURE WITH HYPOXIA: Status: ACTIVE | Noted: 2024-02-28

## 2024-09-25 LAB
ALBUMIN SERPL BCP-MCNC: 3.8 G/DL (ref 3.5–5.2)
ALLENS TEST: ABNORMAL
ALP SERPL-CCNC: 76 U/L (ref 55–135)
ALT SERPL W/O P-5'-P-CCNC: 19 U/L (ref 10–44)
ANION GAP SERPL CALC-SCNC: 12 MMOL/L (ref 8–16)
AST SERPL-CCNC: 21 U/L (ref 10–40)
BASOPHILS # BLD AUTO: 0.02 K/UL (ref 0–0.2)
BASOPHILS NFR BLD: 0.2 % (ref 0–1.9)
BILIRUB SERPL-MCNC: 0.6 MG/DL (ref 0.1–1)
BNP SERPL-MCNC: 143 PG/ML (ref 0–99)
BUN SERPL-MCNC: 9 MG/DL (ref 8–23)
CALCIUM SERPL-MCNC: 10.2 MG/DL (ref 8.7–10.5)
CHLORIDE SERPL-SCNC: 102 MMOL/L (ref 95–110)
CO2 SERPL-SCNC: 24 MMOL/L (ref 23–29)
CREAT SERPL-MCNC: 1 MG/DL (ref 0.5–1.4)
D DIMER PPP IA.FEU-MCNC: 0.85 MG/L FEU
DIFFERENTIAL METHOD BLD: ABNORMAL
EOSINOPHIL # BLD AUTO: 0.3 K/UL (ref 0–0.5)
EOSINOPHIL NFR BLD: 2.8 % (ref 0–8)
ERYTHROCYTE [DISTWIDTH] IN BLOOD BY AUTOMATED COUNT: 13.6 % (ref 11.5–14.5)
EST. GFR  (NO RACE VARIABLE): >60 ML/MIN/1.73 M^2
GLUCOSE SERPL-MCNC: 100 MG/DL (ref 70–110)
HCO3 UR-SCNC: 30.7 MMOL/L (ref 24–28)
HCT VFR BLD AUTO: 42.5 % (ref 40–54)
HGB BLD-MCNC: 14 G/DL (ref 14–18)
IMM GRANULOCYTES # BLD AUTO: 0.04 K/UL (ref 0–0.04)
IMM GRANULOCYTES NFR BLD AUTO: 0.4 % (ref 0–0.5)
LYMPHOCYTES # BLD AUTO: 0.9 K/UL (ref 1–4.8)
LYMPHOCYTES NFR BLD: 8.3 % (ref 18–48)
MCH RBC QN AUTO: 27.4 PG (ref 27–31)
MCHC RBC AUTO-ENTMCNC: 32.9 G/DL (ref 32–36)
MCV RBC AUTO: 83 FL (ref 82–98)
MONOCYTES # BLD AUTO: 0.6 K/UL (ref 0.3–1)
MONOCYTES NFR BLD: 6 % (ref 4–15)
NEUTROPHILS # BLD AUTO: 8.6 K/UL (ref 1.8–7.7)
NEUTROPHILS NFR BLD: 82.3 % (ref 38–73)
NRBC BLD-RTO: 0 /100 WBC
PCO2 BLDA: 50.8 MMHG (ref 35–45)
PH SMN: 7.39 [PH] (ref 7.35–7.45)
PLATELET # BLD AUTO: 250 K/UL (ref 150–450)
PMV BLD AUTO: 9.9 FL (ref 9.2–12.9)
PO2 BLDA: 16 MMHG (ref 40–60)
POC BE: 4 MMOL/L
POC SATURATED O2: 20 % (ref 95–100)
POC TCO2: 32 MMOL/L (ref 24–29)
POCT GLUCOSE: 107 MG/DL (ref 70–110)
POCT GLUCOSE: 480 MG/DL (ref 70–110)
POTASSIUM SERPL-SCNC: 4.2 MMOL/L (ref 3.5–5.1)
PROT SERPL-MCNC: 8 G/DL (ref 6–8.4)
RBC # BLD AUTO: 5.11 M/UL (ref 4.6–6.2)
SAMPLE: ABNORMAL
SITE: ABNORMAL
SODIUM SERPL-SCNC: 138 MMOL/L (ref 136–145)
TROPONIN I SERPL DL<=0.01 NG/ML-MCNC: <0.006 NG/ML (ref 0–0.03)
WBC # BLD AUTO: 10.39 K/UL (ref 3.9–12.7)

## 2024-09-25 PROCEDURE — 85025 COMPLETE CBC W/AUTO DIFF WBC: CPT

## 2024-09-25 PROCEDURE — 96365 THER/PROPH/DIAG IV INF INIT: CPT

## 2024-09-25 PROCEDURE — 84484 ASSAY OF TROPONIN QUANT: CPT

## 2024-09-25 PROCEDURE — 82803 BLOOD GASES ANY COMBINATION: CPT

## 2024-09-25 PROCEDURE — 94640 AIRWAY INHALATION TREATMENT: CPT | Mod: S$GLB,,, | Performed by: FAMILY MEDICINE

## 2024-09-25 PROCEDURE — 63600175 PHARM REV CODE 636 W HCPCS: Performed by: STUDENT IN AN ORGANIZED HEALTH CARE EDUCATION/TRAINING PROGRAM

## 2024-09-25 PROCEDURE — 93005 ELECTROCARDIOGRAM TRACING: CPT

## 2024-09-25 PROCEDURE — 96366 THER/PROPH/DIAG IV INF ADDON: CPT

## 2024-09-25 PROCEDURE — 71046 X-RAY EXAM CHEST 2 VIEWS: CPT | Mod: S$GLB,,, | Performed by: RADIOLOGY

## 2024-09-25 PROCEDURE — 96375 TX/PRO/DX INJ NEW DRUG ADDON: CPT

## 2024-09-25 PROCEDURE — 25000003 PHARM REV CODE 250: Performed by: STUDENT IN AN ORGANIZED HEALTH CARE EDUCATION/TRAINING PROGRAM

## 2024-09-25 PROCEDURE — 99900035 HC TECH TIME PER 15 MIN (STAT)

## 2024-09-25 PROCEDURE — 94645 CONT INHLJ TX EACH ADDL HOUR: CPT

## 2024-09-25 PROCEDURE — 83880 ASSAY OF NATRIURETIC PEPTIDE: CPT

## 2024-09-25 PROCEDURE — 25000242 PHARM REV CODE 250 ALT 637 W/ HCPCS

## 2024-09-25 PROCEDURE — 25000003 PHARM REV CODE 250: Performed by: EMERGENCY MEDICINE

## 2024-09-25 PROCEDURE — 99900031 HC PATIENT EDUCATION (STAT)

## 2024-09-25 PROCEDURE — 93005 ELECTROCARDIOGRAM TRACING: CPT | Mod: S$GLB,,,

## 2024-09-25 PROCEDURE — 25500020 PHARM REV CODE 255: Performed by: EMERGENCY MEDICINE

## 2024-09-25 PROCEDURE — 80053 COMPREHEN METABOLIC PANEL: CPT

## 2024-09-25 PROCEDURE — 94644 CONT INHLJ TX 1ST HOUR: CPT

## 2024-09-25 PROCEDURE — 11000001 HC ACUTE MED/SURG PRIVATE ROOM

## 2024-09-25 PROCEDURE — 94640 AIRWAY INHALATION TREATMENT: CPT

## 2024-09-25 PROCEDURE — 63600175 PHARM REV CODE 636 W HCPCS

## 2024-09-25 PROCEDURE — 94761 N-INVAS EAR/PLS OXIMETRY MLT: CPT

## 2024-09-25 PROCEDURE — 93010 ELECTROCARDIOGRAM REPORT: CPT | Mod: ,,, | Performed by: INTERNAL MEDICINE

## 2024-09-25 PROCEDURE — 83036 HEMOGLOBIN GLYCOSYLATED A1C: CPT

## 2024-09-25 PROCEDURE — 99285 EMERGENCY DEPT VISIT HI MDM: CPT | Mod: 25

## 2024-09-25 PROCEDURE — 85379 FIBRIN DEGRADATION QUANT: CPT

## 2024-09-25 PROCEDURE — 27000221 HC OXYGEN, UP TO 24 HOURS

## 2024-09-25 PROCEDURE — 99214 OFFICE O/P EST MOD 30 MIN: CPT | Mod: 25,S$GLB,,

## 2024-09-25 PROCEDURE — 25000242 PHARM REV CODE 250 ALT 637 W/ HCPCS: Performed by: EMERGENCY MEDICINE

## 2024-09-25 PROCEDURE — 21400001 HC TELEMETRY ROOM

## 2024-09-25 PROCEDURE — 82962 GLUCOSE BLOOD TEST: CPT

## 2024-09-25 RX ORDER — METHYLPREDNISOLONE SOD SUCC 125 MG
125 VIAL (EA) INJECTION
Status: COMPLETED | OUTPATIENT
Start: 2024-09-25 | End: 2024-09-25

## 2024-09-25 RX ORDER — IPRATROPIUM BROMIDE AND ALBUTEROL SULFATE 2.5; .5 MG/3ML; MG/3ML
3 SOLUTION RESPIRATORY (INHALATION)
Status: COMPLETED | OUTPATIENT
Start: 2024-09-25 | End: 2024-09-25

## 2024-09-25 RX ORDER — ACETAMINOPHEN 325 MG/1
650 TABLET ORAL EVERY 4 HOURS PRN
Status: DISCONTINUED | OUTPATIENT
Start: 2024-09-25 | End: 2024-09-28 | Stop reason: HOSPADM

## 2024-09-25 RX ORDER — GLUCAGON 1 MG
1 KIT INJECTION
Status: DISCONTINUED | OUTPATIENT
Start: 2024-09-25 | End: 2024-09-28 | Stop reason: HOSPADM

## 2024-09-25 RX ORDER — NALOXONE HCL 0.4 MG/ML
0.02 VIAL (ML) INJECTION
Status: DISCONTINUED | OUTPATIENT
Start: 2024-09-25 | End: 2024-09-28 | Stop reason: HOSPADM

## 2024-09-25 RX ORDER — IBUPROFEN 200 MG
16 TABLET ORAL
Status: DISCONTINUED | OUTPATIENT
Start: 2024-09-25 | End: 2024-09-28 | Stop reason: HOSPADM

## 2024-09-25 RX ORDER — SODIUM CHLORIDE 0.9 % (FLUSH) 0.9 %
10 SYRINGE (ML) INJECTION EVERY 12 HOURS PRN
Status: DISCONTINUED | OUTPATIENT
Start: 2024-09-25 | End: 2024-09-28 | Stop reason: HOSPADM

## 2024-09-25 RX ORDER — AMLODIPINE BESYLATE 5 MG/1
10 TABLET ORAL
Status: COMPLETED | OUTPATIENT
Start: 2024-09-25 | End: 2024-09-25

## 2024-09-25 RX ORDER — MORPHINE SULFATE 4 MG/ML
2 INJECTION, SOLUTION INTRAMUSCULAR; INTRAVENOUS EVERY 4 HOURS PRN
Status: DISCONTINUED | OUTPATIENT
Start: 2024-09-25 | End: 2024-09-28 | Stop reason: HOSPADM

## 2024-09-25 RX ORDER — IBUPROFEN 200 MG
24 TABLET ORAL
Status: DISCONTINUED | OUTPATIENT
Start: 2024-09-25 | End: 2024-09-28 | Stop reason: HOSPADM

## 2024-09-25 RX ORDER — CHOLECALCIFEROL (VITAMIN D3) 25 MCG
2000 TABLET ORAL DAILY
Status: DISCONTINUED | OUTPATIENT
Start: 2024-09-26 | End: 2024-09-28 | Stop reason: HOSPADM

## 2024-09-25 RX ORDER — FUROSEMIDE 20 MG/1
20 TABLET ORAL DAILY
Status: DISCONTINUED | OUTPATIENT
Start: 2024-09-26 | End: 2024-09-28 | Stop reason: HOSPADM

## 2024-09-25 RX ORDER — NAPROXEN SODIUM 220 MG/1
81 TABLET, FILM COATED ORAL DAILY
Status: DISCONTINUED | OUTPATIENT
Start: 2024-09-26 | End: 2024-09-28 | Stop reason: HOSPADM

## 2024-09-25 RX ORDER — AMLODIPINE BESYLATE 5 MG/1
10 TABLET ORAL DAILY
Status: DISCONTINUED | OUTPATIENT
Start: 2024-09-26 | End: 2024-09-28 | Stop reason: HOSPADM

## 2024-09-25 RX ORDER — TAMSULOSIN HYDROCHLORIDE 0.4 MG/1
0.4 CAPSULE ORAL DAILY
Status: DISCONTINUED | OUTPATIENT
Start: 2024-09-26 | End: 2024-09-28 | Stop reason: HOSPADM

## 2024-09-25 RX ORDER — HEPARIN SODIUM 5000 [USP'U]/ML
5000 INJECTION, SOLUTION INTRAVENOUS; SUBCUTANEOUS EVERY 8 HOURS
Status: DISCONTINUED | OUTPATIENT
Start: 2024-09-25 | End: 2024-09-28 | Stop reason: HOSPADM

## 2024-09-25 RX ORDER — IPRATROPIUM BROMIDE AND ALBUTEROL SULFATE 2.5; .5 MG/3ML; MG/3ML
3 SOLUTION RESPIRATORY (INHALATION) EVERY 6 HOURS
Status: DISCONTINUED | OUTPATIENT
Start: 2024-09-25 | End: 2024-09-25

## 2024-09-25 RX ORDER — GLIMEPIRIDE 2 MG/1
2 TABLET ORAL 2 TIMES DAILY
Status: DISCONTINUED | OUTPATIENT
Start: 2024-09-25 | End: 2024-09-26

## 2024-09-25 RX ORDER — OXYBUTYNIN CHLORIDE 5 MG/1
5 TABLET, EXTENDED RELEASE ORAL DAILY
Status: DISCONTINUED | OUTPATIENT
Start: 2024-09-26 | End: 2024-09-28 | Stop reason: HOSPADM

## 2024-09-25 RX ORDER — IPRATROPIUM BROMIDE AND ALBUTEROL SULFATE 2.5; .5 MG/3ML; MG/3ML
3 SOLUTION RESPIRATORY (INHALATION) EVERY 6 HOURS
Status: COMPLETED | OUTPATIENT
Start: 2024-09-26 | End: 2024-09-27

## 2024-09-25 RX ORDER — MAGNESIUM SULFATE HEPTAHYDRATE 40 MG/ML
2 INJECTION, SOLUTION INTRAVENOUS ONCE
Status: COMPLETED | OUTPATIENT
Start: 2024-09-25 | End: 2024-09-25

## 2024-09-25 RX ORDER — ALBUTEROL SULFATE 2.5 MG/.5ML
10 SOLUTION RESPIRATORY (INHALATION)
Status: COMPLETED | OUTPATIENT
Start: 2024-09-25 | End: 2024-09-25

## 2024-09-25 RX ORDER — METHYLPREDNISOLONE SOD SUCC 125 MG
60 VIAL (EA) INJECTION EVERY 6 HOURS
Status: DISCONTINUED | OUTPATIENT
Start: 2024-09-25 | End: 2024-09-27

## 2024-09-25 RX ORDER — MORPHINE SULFATE 4 MG/ML
4 INJECTION, SOLUTION INTRAMUSCULAR; INTRAVENOUS EVERY 4 HOURS PRN
Status: DISCONTINUED | OUTPATIENT
Start: 2024-09-25 | End: 2024-09-28 | Stop reason: HOSPADM

## 2024-09-25 RX ORDER — ATORVASTATIN CALCIUM 40 MG/1
40 TABLET, FILM COATED ORAL NIGHTLY
Status: DISCONTINUED | OUTPATIENT
Start: 2024-09-25 | End: 2024-09-28 | Stop reason: HOSPADM

## 2024-09-25 RX ORDER — CARVEDILOL 12.5 MG/1
25 TABLET ORAL 2 TIMES DAILY WITH MEALS
Status: DISCONTINUED | OUTPATIENT
Start: 2024-09-25 | End: 2024-09-26

## 2024-09-25 RX ORDER — AMOXICILLIN 250 MG
1 CAPSULE ORAL DAILY PRN
Status: DISCONTINUED | OUTPATIENT
Start: 2024-09-25 | End: 2024-09-28 | Stop reason: HOSPADM

## 2024-09-25 RX ORDER — ALBUTEROL SULFATE 0.83 MG/ML
2.5 SOLUTION RESPIRATORY (INHALATION)
Status: DISCONTINUED | OUTPATIENT
Start: 2024-09-25 | End: 2024-09-25 | Stop reason: HOSPADM

## 2024-09-25 RX ORDER — INSULIN ASPART 100 [IU]/ML
0-5 INJECTION, SOLUTION INTRAVENOUS; SUBCUTANEOUS
Status: DISCONTINUED | OUTPATIENT
Start: 2024-09-25 | End: 2024-09-28 | Stop reason: HOSPADM

## 2024-09-25 RX ORDER — ONDANSETRON HYDROCHLORIDE 2 MG/ML
4 INJECTION, SOLUTION INTRAVENOUS EVERY 8 HOURS PRN
Status: DISCONTINUED | OUTPATIENT
Start: 2024-09-25 | End: 2024-09-28 | Stop reason: HOSPADM

## 2024-09-25 RX ORDER — FAMOTIDINE 10 MG/ML
20 INJECTION INTRAVENOUS 2 TIMES DAILY
Status: DISCONTINUED | OUTPATIENT
Start: 2024-09-25 | End: 2024-09-27

## 2024-09-25 RX ORDER — GLIMEPIRIDE 2 MG/1
2 TABLET ORAL 2 TIMES DAILY
Status: DISCONTINUED | OUTPATIENT
Start: 2024-09-25 | End: 2024-09-25

## 2024-09-25 RX ORDER — BENZONATATE 100 MG/1
100 CAPSULE ORAL ONCE
Status: COMPLETED | OUTPATIENT
Start: 2024-09-25 | End: 2024-09-25

## 2024-09-25 RX ORDER — TALC
6 POWDER (GRAM) TOPICAL NIGHTLY PRN
Status: DISCONTINUED | OUTPATIENT
Start: 2024-09-25 | End: 2024-09-28 | Stop reason: HOSPADM

## 2024-09-25 RX ORDER — IPRATROPIUM BROMIDE 0.5 MG/2.5ML
1 SOLUTION RESPIRATORY (INHALATION)
Status: COMPLETED | OUTPATIENT
Start: 2024-09-25 | End: 2024-09-25

## 2024-09-25 RX ORDER — PROCHLORPERAZINE EDISYLATE 5 MG/ML
5 INJECTION INTRAMUSCULAR; INTRAVENOUS EVERY 6 HOURS PRN
Status: DISCONTINUED | OUTPATIENT
Start: 2024-09-25 | End: 2024-09-28 | Stop reason: HOSPADM

## 2024-09-25 RX ADMIN — IPRATROPIUM BROMIDE AND ALBUTEROL SULFATE 3 ML: 2.5; .5 SOLUTION RESPIRATORY (INHALATION) at 11:09

## 2024-09-25 RX ADMIN — AMLODIPINE BESYLATE 10 MG: 5 TABLET ORAL at 06:09

## 2024-09-25 RX ADMIN — INSULIN ASPART 3 UNITS: 100 INJECTION, SOLUTION INTRAVENOUS; SUBCUTANEOUS at 10:09

## 2024-09-25 RX ADMIN — SACUBITRIL AND VALSARTAN 1 TABLET: 24; 26 TABLET, FILM COATED ORAL at 08:09

## 2024-09-25 RX ADMIN — ATORVASTATIN CALCIUM 40 MG: 40 TABLET, FILM COATED ORAL at 08:09

## 2024-09-25 RX ADMIN — CARVEDILOL 25 MG: 12.5 TABLET, FILM COATED ORAL at 07:09

## 2024-09-25 RX ADMIN — MAGNESIUM SULFATE HEPTAHYDRATE 2 G: 40 INJECTION, SOLUTION INTRAVENOUS at 12:09

## 2024-09-25 RX ADMIN — ALBUTEROL SULFATE 10 MG: 2.5 SOLUTION RESPIRATORY (INHALATION) at 05:09

## 2024-09-25 RX ADMIN — BENZONATATE 100 MG: 100 CAPSULE ORAL at 06:09

## 2024-09-25 RX ADMIN — METHYLPREDNISOLONE SODIUM SUCCINATE 60 MG: 125 INJECTION, POWDER, FOR SOLUTION INTRAMUSCULAR; INTRAVENOUS at 07:09

## 2024-09-25 RX ADMIN — FAMOTIDINE 20 MG: 10 INJECTION, SOLUTION INTRAVENOUS at 08:09

## 2024-09-25 RX ADMIN — IOHEXOL 85 ML: 350 INJECTION, SOLUTION INTRAVENOUS at 02:09

## 2024-09-25 RX ADMIN — ALBUTEROL SULFATE 2.5 MG: 0.83 SOLUTION RESPIRATORY (INHALATION) at 10:09

## 2024-09-25 RX ADMIN — METHYLPREDNISOLONE SODIUM SUCCINATE 125 MG: 125 INJECTION, POWDER, FOR SOLUTION INTRAMUSCULAR; INTRAVENOUS at 12:09

## 2024-09-25 RX ADMIN — Medication 6 MG: at 09:09

## 2024-09-25 RX ADMIN — IPRATROPIUM BROMIDE 1 MG: 0.5 SOLUTION RESPIRATORY (INHALATION) at 05:09

## 2024-09-25 RX ADMIN — HEPARIN SODIUM 5000 UNITS: 5000 INJECTION INTRAVENOUS; SUBCUTANEOUS at 09:09

## 2024-09-25 NOTE — SUBJECTIVE & OBJECTIVE
"Past Medical History:   Diagnosis Date    CAD (coronary artery disease)     Sees [facility], h/o stent    Diabetes mellitus     Hypertension     Kidney stone     Stroke     age 62       Past Surgical History:   Procedure Laterality Date    CARDIAC CATHETERIZATION      with stent    COLONOSCOPY N/A 03/27/2024    Procedure: COLONOSCOPY;  Surgeon: Ariela Castro MD;  Location: [facility] ENDO;  Service: Endoscopy;  Laterality: N/A;    ESOPHAGOGASTRODUODENOSCOPY N/A 03/27/2024    Procedure: EGD (ESOPHAGOGASTRODUODENOSCOPY);  Surgeon: Ariela Castro MD;  Location: [facility] ENDO;  Service: Endoscopy;  Laterality: N/A;    LEFT HEART CATHETERIZATION Left 9/10/2024    Procedure: Left heart cath;  Surgeon: Jemal Drummond MD;  Location: [facility] CATH LAB;  Service: Cardiology;  Laterality: Left;    SHOULDER SURGERY Right        Review of patient's allergies indicates:   Allergen Reactions    Dapagliflozin      Other reaction(s): Other (See Comments)    Pcn [penicillins]     Linagliptin Other (See Comments)     "it knocked me down", "it almost killed me"    Lisinopril Other (See Comments)     cough    Pantoprazole Hives       Current Facility-Administered Medications on File Prior to Encounter   Medication    [COMPLETED] albuterol nebulizer solution 2.5 mg    [COMPLETED] aluminum-magnesium hydroxide-simethicone 200-200-20 mg/5 mL suspension 30 mL    And    [COMPLETED] LIDOcaine viscous HCl 2% oral solution 15 mL    [COMPLETED] aspirin chewable tablet 243 mg    [COMPLETED] lactated ringers bolus 500 mL     Current Outpatient Medications on File Prior to Encounter   Medication Sig    albuterol-ipratropium (DUO-NEB) 2.5 mg-0.5 mg/3 mL nebulizer solution Take 3 mLs by nebulization every 6 (six) hours as needed for Wheezing. Rescue    alpha-D-galactosidase (BEANO ORAL) Take 1 tablet by mouth 2 (two) times a day.    amLODIPine (NORVASC) 10 MG tablet Take 1 tablet (10 mg total) by mouth once daily.    aspirin 81 MG Chew Take 81 mg by " mouth once daily.    atorvastatin (LIPITOR) 40 MG tablet Take 1 tablet (40 mg total) by mouth every evening.    carvediloL (COREG) 25 MG tablet Take 1 tablet (25 mg total) by mouth 2 (two) times daily with meals.    cholecalciferol, vitamin D3, (VITAMIN D3) 50 mcg (2,000 unit) Cap capsule 1 capsule.    furosemide (LASIX) 20 MG tablet Take 1 tablet (20 mg total) by mouth once daily.    glimepiride (AMARYL) 2 MG tablet Take 2 mg by mouth 2 (two) times a day.    metFORMIN (GLUCOPHAGE) 1000 MG tablet Take 1 tablet (1,000 mg total) by mouth 2 (two) times daily with meals.    mirabegron (MYRBETRIQ) 25 mg Tb24 ER tablet Take 1 tablet (25 mg total) by mouth once daily.    sacubitriL-valsartan (ENTRESTO) 24-26 mg per tablet Take 1 tablet by mouth 2 (two) times daily.    tamsulosin (FLOMAX) 0.4 mg Cap Take 1 capsule (0.4 mg total) by mouth once daily.    [DISCONTINUED] benzonatate (TESSALON) 200 MG capsule Take 1 capsule (200 mg total) by mouth 3 (three) times daily as needed for Cough. (Patient not taking: Reported on 9/25/2024)    [DISCONTINUED] blood sugar diagnostic Strp To check BG 1 times daily, to use with insurance preferred meter (Patient not taking: Reported on 9/25/2024)    [DISCONTINUED] blood sugar diagnostic Strp To check BG 3 times daily, to use with insurance preferred meter (Patient not taking: Reported on 9/25/2024)    [DISCONTINUED] blood-glucose meter kit To check BG 1 times daily, to use with insurance preferred meter (Patient not taking: Reported on 9/25/2024)    [DISCONTINUED] blood-glucose meter,continuous (FREESTYLE RAMBO 3 READER) Misc USE WITH RAMBO 3 SENSORS (Patient not taking: Reported on 9/25/2024)    [DISCONTINUED] blood-glucose sensor (FREESTYLE RAMBO 3 SENSOR) Blanche Change every 14 days (Patient not taking: Reported on 9/25/2024)    [DISCONTINUED] famotidine (PEPCID) 40 MG tablet Take 1 tablet (40 mg total) by mouth nightly as needed for Heartburn. (Patient not taking: Reported on 9/25/2024)     [DISCONTINUED] fluticasone propionate (FLONASE) 50 mcg/actuation nasal spray 1 spray in each nostril Nasally Once a day (Patient not taking: Reported on 9/25/2024)    [DISCONTINUED] fluticasone propionate (FLONASE) 50 mcg/actuation nasal spray 1 spray (50 mcg total) by Each Nostril route once daily. (Patient not taking: Reported on 9/25/2024)    [DISCONTINUED] fluticasone-salmeterol diskus inhaler 250-50 mcg Inhale 1 puff into the lungs 2 (two) times daily. Controller (Patient not taking: Reported on 9/25/2024)    [DISCONTINUED] guaiFENesin-codeine 100-10 mg/5 ml (TUSSI-ORGANIDIN NR)  mg/5 mL syrup Take 5 mLs by mouth nightly as needed for Cough. (Patient not taking: Reported on 9/25/2024)    [DISCONTINUED] insulin lispro (HUMALOG KWIKPEN INSULIN) 100 unit/mL pen INJECT AS NEEDED BEFORE MEALS: 150-200=+2, 201-250=+4; 251-300=+6; 301-350=+8, over 350=+10. MAX DAILY DOSE 30 UNITSunits (Patient not taking: Reported on 9/25/2024)    [DISCONTINUED] lancets Misc To check BG 1 times daily, to use with insurance preferred meter (Patient not taking: Reported on 9/25/2024)    [DISCONTINUED] lancets Misc To check BG 3 times daily, to use with insurance preferred meter (Patient not taking: Reported on 9/25/2024)    [DISCONTINUED] latanoprost 0.005 % ophthalmic solution Place 1 drop into both eyes every evening. (Patient not taking: Reported on 9/25/2024)    [DISCONTINUED] magnesium citrate solution Take half the bottle for constipation as needed. (Patient not taking: Reported on 9/25/2024)    [DISCONTINUED] montelukast (SINGULAIR) 10 mg tablet Take 10 mg by mouth every evening. (Patient not taking: Reported on 9/25/2024)    [DISCONTINUED] mycophenolate (CELLCEPT) 500 mg Tab Take one tablet daily for 7 days, then take 1 tablet in the morning and one at night for 7 days, then take two tablets in the morning and one at night for 7 days, then take 2 tablets in the morning and 2 tablets at night. (Patient not taking:  "Reported on 9/25/2024)    [DISCONTINUED] omeprazole (PRILOSEC) 40 MG capsule Take 1 capsule (40 mg total) by mouth once daily. (Patient not taking: Reported on 9/25/2024)    [DISCONTINUED] peg 400/hypromellose/glycerin (DRY EYE RELIEF OPHT) Apply to eye. (Patient not taking: Reported on 9/25/2024)    [DISCONTINUED] pen needle, diabetic (BD ULTRA-FINE HEATHER PEN NEEDLE) 32 gauge x 5/32" Ndle Use to inject 3 (three) times daily before meals. (Patient not taking: Reported on 9/25/2024)    [DISCONTINUED] polyethylene glycol (GLYCOLAX) 17 gram PwPk Take by mouth. (Patient not taking: Reported on 9/25/2024)    [DISCONTINUED] polyethylene glycol (GLYCOLAX) 17 gram/dose powder Take 17 g by mouth daily as needed (constipation). (Patient not taking: Reported on 9/25/2024)    [DISCONTINUED] senna-docusate 8.6-50 mg (SENNA WITH DOCUSATE SODIUM) 8.6-50 mg per tablet Take 1 tablet by mouth once daily. (Patient not taking: Reported on 9/25/2024)    [DISCONTINUED] sucralfate (CARAFATE) 1 gram tablet Take 1 tablet (1 g total) by mouth 4 (four) times daily. (Patient not taking: Reported on 9/25/2024)     Family History       Problem Relation (Age of Onset)    Cancer Mother    Colon cancer Sister          Tobacco Use    Smoking status: Never     Passive exposure: Never    Smokeless tobacco: Never   Substance and Sexual Activity    Alcohol use: No    Drug use: Never    Sexual activity: Not Currently     Review of Systems   Constitutional:  Positive for activity change (Decreased), appetite change (Decreased) and fatigue.   HENT:  Negative for congestion.    Eyes:  Negative for visual disturbance.   Respiratory:  Positive for cough, chest tightness and shortness of breath.    Cardiovascular:  Positive for chest pain. Negative for palpitations.   Gastrointestinal:  Negative for abdominal pain, nausea and vomiting.   Endocrine: Negative.    Genitourinary:  Negative for dysuria.   Musculoskeletal:  Negative for arthralgias and neck pain. "   Skin:  Negative for color change.   Allergic/Immunologic: Negative.    Neurological:  Positive for headaches. Negative for speech difficulty.   Hematological: Negative.    Psychiatric/Behavioral:  Negative for agitation.      Objective:     Vital Signs (Most Recent):  Temp: 98.1 °F (36.7 °C) (09/25/24 1114)  Pulse: 97 (09/25/24 1732)  Resp: (!) 25 (09/25/24 1732)  BP: (!) 162/108 (09/25/24 1732)  SpO2: 99 % (09/25/24 1732) Vital Signs (24h Range):  Temp:  [98.1 °F (36.7 °C)] 98.1 °F (36.7 °C)  Pulse:  [] 97  Resp:  [16-29] 25  SpO2:  [93 %-100 %] 99 %  BP: (138-169)/() 162/108     Weight: 63.5 kg (140 lb)  Body mass index is 21.29 kg/m².     Physical Exam  Vitals and nursing note reviewed.   Constitutional:       Appearance: He is ill-appearing.   HENT:      Head: Normocephalic and atraumatic.      Nose: Nose normal.      Mouth/Throat:      Mouth: Mucous membranes are moist.   Eyes:      Pupils: Pupils are equal, round, and reactive to light.   Cardiovascular:      Rate and Rhythm: Regular rhythm. Tachycardia present.      Pulses: Normal pulses.      Heart sounds: Normal heart sounds.   Pulmonary:      Effort: Respiratory distress (Diffuse crackles over the entire lung field) present.   Abdominal:      General: Abdomen is flat.      Palpations: Abdomen is soft.      Tenderness: There is no abdominal tenderness.   Musculoskeletal:         General: No swelling. Normal range of motion.      Cervical back: Normal range of motion and neck supple.   Skin:     General: Skin is warm.      Capillary Refill: Capillary refill takes less than 2 seconds.   Neurological:      General: No focal deficit present.      Mental Status: He is alert and oriented to person, place, and time.   Psychiatric:         Mood and Affect: Mood normal.              CRANIAL NERVES     CN III, IV, VI   Pupils are equal, round, and reactive to light.       Significant Labs: All pertinent labs within the past 24 hours have been  reviewed.  CBC:   Recent Labs   Lab 09/24/24  1809 09/25/24  1159   WBC 8.34 10.39   HGB 13.2* 14.0   HCT 39.3* 42.5    250     CMP:   Recent Labs   Lab 09/24/24  1809 09/25/24  1159    138   K 4.5 4.2    102   CO2 22* 24   * 100   BUN 11 9   CREATININE 1.1 1.0   CALCIUM 9.8 10.2   PROT 7.2 8.0   ALBUMIN 3.5 3.8   BILITOT 0.5 0.6   ALKPHOS 63 76   AST 21 21   ALT 18 19   ANIONGAP 13 12       Significant Imaging: I have reviewed all pertinent imaging results/findings within the past 24 hours.

## 2024-09-25 NOTE — HPI
73-year-old male with PMH of recently diagnosed interstitial lung disease, chronic hypoxic respiratory failure on 2 L oxygen at home, CAD s/p PCI, history of stroke, chronic systolic heart failure, hypertension, diabetes mellitus, hyperlipidemia, GERD, BPH presented to ER with complaints of chest pain.      Patient is a very poor historian and seems to have poor understanding of his medical issues.  Patient reports that he had onset of substernal and epigastric chest pain 2 days ago.  He came to the ER and was discharged home day before yesterday.  His symptoms continued and he decided come to ER for further evaluation today.  On my evaluation patient reports that his chest pain has resolved as of now.  He does report ongoing shortness of breath with even mild exertion and that has been getting worse for last few weeks.  He has been using his nebulizer more frequently with some relief.  Of note he was recently diagnosed with I LD and saw a pulmonologist who started him on CellCept.  Patient states that he only took 1 dose of CellCept which made his head go crazy.  He has been having respiratory issues for last 6 months to 1 year and has been on oxygen for last 3-4 months.  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    On presentation to ER he was hypoxic requiring increased oxygen than his baseline, tachycardic, hypertensive, lab work significant for D-dimer 0.85, CT angio chest was done which was negative for PE but did show chronic interstitial lung disease.  Troponins were negative and EKG was nonischemic.  He was given a dose of magnesium sulfate, Solu-Medrol and multiple breathing treatments in the ER.  ER requested admitting the patient for ongoing care given increased oxygen requirements.    PMH: Recently diagnosed ILD, chronic hypoxic respiratory failure on 2 L oxygen at home, CAD s/p PCI, history of stroke, chronic systolic heart  failure, hypertension, diabetes mellitus, hyperlipidemia, GERD, BPH   PSH: PCI   SH:  Denies smoking but has been exposed to secondhand smoke during going ears.  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  Denies alcohol use, denies recreational drug use.    FH: Reviewed and not pertinent.

## 2024-09-25 NOTE — ASSESSMENT & PLAN NOTE
-Has history of hiatal hernia leading to worsening reflux symptoms.  Follows with GI.  Plan for possible surgical intervention for hiatal hernia.    -Start IV famotidine.  Reported allergic to pantoprazole.

## 2024-09-25 NOTE — ED PROVIDER NOTES
"Encounter Date: 9/24/2024    SCRIBE #1 NOTE: I, Fernando Yumiko, am scribing for, and in the presence of,  Brandon Damon MD.       History     Chief Complaint   Patient presents with    Chest Pain     Pt BIB EMS, c/o CP, SOB, and dizziness x 1 hour. Pt denies any abd pain, n/v/d.      73 y.o. male with PMHx of CAD s/p stent placement, DM, HTN, HLD, CVA, presents to the ED for evaluation of substernal chest tightness/pain with associated nausea and lightheadedness that started PTA. Patient reports that symptoms started initially started after feeling hot while walking to is truck after grocery shopping. Reports driving home and started to have CP when he was at home. States that he believed that he was hungry and had eaten, without improvement of symptoms. He had then called his PCP, who then referred him to the ED for evaluation. Reports that symptoms feel similar to a previous episode of GERD. No medications taken for symptoms. Denies vomiting or any associated symptoms.     The history is provided by the patient. No  was used.     Review of patient's allergies indicates:   Allergen Reactions    Dapagliflozin      Other reaction(s): Other (See Comments)    Pcn [penicillins]     Linagliptin Other (See Comments)     "it knocked me down", "it almost killed me"    Lisinopril Other (See Comments)     cough    Pantoprazole Hives     Past Medical History:   Diagnosis Date    CAD (coronary artery disease)     Sees [facility], h/o stent    Diabetes mellitus     Hypertension     Kidney stone     Stroke     age 62     Past Surgical History:   Procedure Laterality Date    CARDIAC CATHETERIZATION      with stent    COLONOSCOPY N/A 03/27/2024    Procedure: COLONOSCOPY;  Surgeon: Ariela Castro MD;  Location: [facility] ENDO;  Service: Endoscopy;  Laterality: N/A;    ESOPHAGOGASTRODUODENOSCOPY N/A 03/27/2024    Procedure: EGD (ESOPHAGOGASTRODUODENOSCOPY);  Surgeon: Ariela Castro MD;  Location: [facility] " ENDO;  Service: Endoscopy;  Laterality: N/A;    LEFT HEART CATHETERIZATION Left 9/10/2024    Procedure: Left heart cath;  Surgeon: Jemal Drummond MD;  Location: [facility] CATH LAB;  Service: Cardiology;  Laterality: Left;    SHOULDER SURGERY Right      Family History   Problem Relation Name Age of Onset    Cancer Mother      Colon cancer Sister      Esophageal cancer Neg Hx       Social History     Tobacco Use    Smoking status: Never     Passive exposure: Never    Smokeless tobacco: Never   Substance Use Topics    Alcohol use: No    Drug use: Never     Review of Systems   Constitutional:  Negative for fever.   HENT:  Negative for sore throat.    Eyes:  Negative for redness.   Respiratory:  Negative for shortness of breath.    Cardiovascular:  Positive for chest pain.   Gastrointestinal:  Positive for nausea. Negative for vomiting.   Genitourinary:  Negative for dysuria.   Musculoskeletal:  Negative for myalgias.   Skin:  Negative for rash.   Neurological:  Positive for light-headedness.       Physical Exam     Initial Vitals [09/24/24 1734]   BP Pulse Resp Temp SpO2   138/75 88 16 98.2 °F (36.8 °C) 96 %      MAP       --         Physical Exam    Constitutional: Vital signs are normal. He appears well-developed and well-nourished.  Non-toxic appearance. He does not have a sickly appearance. He does not appear ill.   HENT:   Head: Normocephalic and atraumatic.   Mouth/Throat: Mucous membranes are normal.   Eyes: EOM are normal.   Neck: Neck supple.   Cardiovascular:  Normal rate, regular rhythm and normal heart sounds.           No murmur heard.  Pulmonary/Chest: Breath sounds normal. No respiratory distress. He has no wheezes. He has no rhonchi. He has no rales.   Abdominal: Abdomen is soft. Bowel sounds are normal. He exhibits no distension. There is no abdominal tenderness.   Musculoskeletal:      Cervical back: Neck supple.     Neurological: He is alert.   Skin: Skin is warm and dry. No rash noted.   Psychiatric:  He has a normal mood and affect.         ED Course   Procedures  Labs Reviewed   CBC W/ AUTO DIFFERENTIAL - Abnormal       Result Value    WBC 8.34      RBC 4.68      Hemoglobin 13.2 (*)     Hematocrit 39.3 (*)     MCV 84      MCH 28.2      MCHC 33.6      RDW 13.5      Platelets 223      MPV 9.6      Immature Granulocytes 0.2      Gran # (ANC) 5.5      Immature Grans (Abs) 0.02      Lymph # 1.5      Mono # 0.6      Eos # 0.7 (*)     Baso # 0.02      nRBC 0      Gran % 65.5      Lymph % 18.2      Mono % 7.6      Eosinophil % 8.3 (*)     Basophil % 0.2      Platelet Estimate Appears normal      Differential Method Automated     COMPREHENSIVE METABOLIC PANEL - Abnormal    Sodium 138      Potassium 4.5      Chloride 103      CO2 22 (*)     Glucose 162 (*)     BUN 11      Creatinine 1.1      Calcium 9.8      Total Protein 7.2      Albumin 3.5      Total Bilirubin 0.5      Alkaline Phosphatase 63      AST 21      ALT 18      eGFR >60      Anion Gap 13     URINALYSIS, REFLEX TO URINE CULTURE - Abnormal    Specimen UA Urine, Clean Catch      Color, UA Colorless (*)     Appearance, UA Clear      pH, UA 6.0      Specific Gravity, UA 1.005      Protein, UA Negative      Glucose, UA Negative      Ketones, UA Negative      Bilirubin (UA) Negative      Occult Blood UA Negative      Nitrite, UA Negative      Urobilinogen, UA Negative      Leukocytes, UA Negative      Narrative:     Specimen Source->Urine   POCT GLUCOSE - Abnormal    POCT Glucose 159 (*)    TROPONIN I    Troponin I <0.006     TROPONIN I    Troponin I <0.006     B-TYPE NATRIURETIC PEPTIDE    BNP 56            Imaging Results              X-Ray Chest AP Portable (Final result)  Result time 09/24/24 19:28:59      Final result by Marc Hawkins MD (09/24/24 19:28:59)                   Narrative:    EXAMINATION:  XR CHEST AP PORTABLE    CLINICAL HISTORY:  Chest Pain;    TECHNIQUE:  Single frontal view of the chest was performed.    COMPARISON:  The  09/10/2024    FINDINGS:  Patchy reticular opacities suggesting fibrosis and scarring remain.  No new consolidation or effusion.  The heart mediastinal contours appear normal in size and configuration.  The 4      Electronically signed by: Marc Hawkins  Date:    09/24/2024  Time:    19:28                                     Medications   lactated ringers bolus 500 mL (0 mLs Intravenous Stopped 9/24/24 2127)   aluminum-magnesium hydroxide-simethicone 200-200-20 mg/5 mL suspension 30 mL (30 mLs Oral Given 9/24/24 2002)     And   LIDOcaine viscous HCl 2% oral solution 15 mL (15 mLs Oral Given 9/24/24 2002)   aspirin chewable tablet 243 mg (243 mg Oral Given 9/24/24 2023)     Medical Decision Making  Amount and/or Complexity of Data Reviewed  Labs: ordered. Decision-making details documented in ED Course.  Radiology: ordered. Decision-making details documented in ED Course.  ECG/medicine tests: ordered and independent interpretation performed. Decision-making details documented in ED Course.    Risk  OTC drugs.  Prescription drug management.    Patient's vitals unremarkable   Patient is well-appearing in no acute distress   Patient does continue to have some of this discomfort in his chest  Differential includes ACS, GERD, PE, pneumonia, dissection, chest wall pain  Given a GI cocktail with the resident complete resolution of his pain   EKG without evidence of ischemia, troponins both negative  His vitals unremarkable he continues to be well-appearing  Chest x-ray without any mediastinal widening but does show findings consistent with interstitial lung disease; no evidence of pneumonia  Very low suspicion for PE  Do feel the patient is stable for discharge  Advised to follow up with his cardiologist on an outpatient within the next 1 2 weeks for outpatient workup   Given return precautions for recurrence of his symptoms your any new or concerning symptoms   Patient is stable for discharge    I discussed with the  patient/family the diagnosis, treatment plan, indications for return to the emergency department, and for expected follow-up. The patient/family verbalized an understanding. The patient/family is asked if there are any questions or concerns. We discuss the case, until all issues are addressed to the patient/familys satisfaction. Patient/family understands and is agreeable to the plan.   Brandon Damon    DISCLAIMER: This note was prepared with [employer] voice recognition transcription software. Garbled syntax, mangled pronouns, and other bizarre constructions may be attributed to that software system.          Scribe Attestation:   Scribe #1: I performed the above scribed service and the documentation accurately describes the services I performed. I attest to the accuracy of the note.                                 I, Brandon Damon MD, personally performed the services described in this documentation. All medical record entries made by the scribe were at my direction and in my presence. I have reviewed the chart and agree that the record reflects my personal performance and is accurate and complete.      DISCLAIMER: This note was prepared with [employer] voice recognition transcription software. Garbled syntax, mangled pronouns, and other bizarre constructions may be attributed to that software system.          Clinical Impression:  Final diagnoses:  [R07.9] Chest pain          ED Disposition Condition    Discharge Stable          ED Prescriptions    None       Follow-up Information    None                 Brandon Damon MD  09/25/24 5080

## 2024-09-25 NOTE — ED NOTES
Pt arrive to ED c/o non radiating mid sternal chest pain x3 days, sob, and cough. Pt denies ha, n/v/d, loc. Pr reports was seen in ED yesterday for same problem but had no relief and went to urgent care this morning and was sent here.

## 2024-09-25 NOTE — PROGRESS NOTES
Subjective:      Patient ID: Emmanuel Grant is a 73 y.o. male.    Vitals:  blood pressure is 146/94 (abnormal) and his pulse is 105. His oxygen saturation is 98%.     Chief Complaint: Chest Pain    Patient presents for chest pain located in the center  that happens when he walks and has been on for over a month. Pt went to the ED  yesterday and evaluated and discharged. Pt states he has acid reflex but this feels different. He states he attempted to go to PCP this morning but was unable to be seen so he came to urgent care. Patient states symptoms are increasing, states feels like sometimes it could feel like a lung problem but other times feels it through belly and in center of chest.. Patient states that he has home oxygen. He had left it in his personal vehicle when he came in to clinic. Patient denies loss of consciousness. States the SOB is increasing.     Chest Pain   This is a new problem. The current episode started more than 1 month ago. The onset quality is gradual. The problem has been gradually worsening. The pain is at a severity of 3/10. The quality of the pain is described as burning, sharp and pressure. The pain does not radiate. Associated symptoms include a cough and shortness of breath. Pertinent negatives include no nausea, numbness, PND, sputum production, syncope or vomiting. It is unknown what precipitates the cough. The cough is Non-productive. Nothing relieves the cough. The cough is worsened by activity. The pain is aggravated by exertion.       Constitution: Negative for activity change.   Cardiovascular:  Positive for chest pain and sob on exertion. Negative for passing out.   Eyes: Negative.    Respiratory:  Positive for cough and shortness of breath. Negative for sputum production.    Gastrointestinal:  Negative for nausea and vomiting.   Neurological:  Negative for passing out, facial drooping, speech difficulty, disorientation, altered mental status, loss of consciousness, numbness  and tremors.   Psychiatric/Behavioral:  Negative for altered mental status and disorientation.       Objective:     Physical Exam   Constitutional: He is oriented to person, place, and time. He appears ill. He appears distressed.   HENT:   Head: Normocephalic and atraumatic.   Nose: Nose normal.   Mouth/Throat: Mucous membranes are moist.   Eyes: Pupils are equal, round, and reactive to light.   Cardiovascular: Tachycardia present.   Pulmonary/Chest: He is in respiratory distress (increased effort, increased SOB).   Musculoskeletal: Normal range of motion.         General: Normal range of motion.   Neurological: He is alert and oriented to person, place, and time.   Skin: Skin is warm.   Psychiatric: Mood normal.     XR CHEST PA AND LATERAL    Result Date: 9/25/2024  EXAMINATION: XR CHEST PA AND LATERAL CLINICAL HISTORY: Shortness of breath TECHNIQUE: PA and lateral views of the chest were performed. COMPARISON: 09/24/2024 FINDINGS: The cardiomediastinal silhouette is normal in size and midline. Pulmonary vascularity appears within normal limits. Lungs appear similar to prior exam.  Prominent interstitial markings suggesting fibrotic change.  No new lobar consolidation.  No new large volume pleural fluid or pneumothorax.     As above Electronically signed by: Saman Farrell MD Date:    09/25/2024 Time:    11:09    EKG completed in clinic.   Assessment:     1. SOB (shortness of breath)    2. Chest pain, unspecified type        Plan:       SOB (shortness of breath)  -     XR CHEST PA AND LATERAL; Future; Expected date: 09/25/2024  -     albuterol nebulizer solution 2.5 mg  -     IN OFFICE EKG 12-LEAD (to [place])  -     Refer to Emergency Dept.    Chest pain, unspecified type  -     XR CHEST PA AND LATERAL; Future; Expected date: 09/25/2024  -     IN OFFICE EKG 12-LEAD (to [place])  -     Refer to Emergency Dept.      Patient had repeat EKG, Chest Xray and received neb treatment in urgent care today. Patient still SOB with  being on 3 L NC in clinic. SpO2 was 88-90 on arrival up to 95-98 after being placed on 3L NC. No relief in patient overall symptoms with oxygen. Patient repeatedly asking for help. EMS called. Patient going to [facility]. Report given to charge nurse in ER who is anticipating patient arrival. Patient left via ambulance at 1040.

## 2024-09-25 NOTE — ASSESSMENT & PLAN NOTE
"-Recently diagnosed with interstitial lung disease.  On 2 L oxygen at baseline.  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  -Follows with pulmonology outpatient.  Has received multiple courses of steroid taper.  However his symptoms have been gradually worsening over the last few months.    -Was recently started on CellCept but states that he took only 1 dose and his "head went crazy."  -Given a dose of magnesium sulfate, Solu-Medrol, breathing treatments in the ER.    -Continue IV Solu-Medrol 60 mg q.6 hours.  Continue inhalers and nebulizers as needed.  -Consult pulmonology in a.m..    "

## 2024-09-25 NOTE — PROGRESS NOTES
CHW - Initial Contact    This Community Health Worker completed the Social Determinant of Health questionnaire with patient  at bedside  today.    Pt identified barriers of most importance are: food insecurities.   Referrals to community agencies completed with patient/caregiver consent outside of [facility] include: yes: findhelp.org.  Referrals were put through [facility] - no: none at this time.  Support and Services: has no support at this time.  Other information discussed the patient needs / wants help with: Completed SDOH with patient at bedside today, pt has food insecurities and issues with utility bill payments. Will follow up in one week to check patient's future needs.    Follow up required: yes.  Follow-up Outreach - Due: 10/1/2024

## 2024-09-25 NOTE — ASSESSMENT & PLAN NOTE
-On 2 L oxygen at baseline for last 4-6 months due to underlying LD.    -Now requiring 3-4 L oxygen with worsening shortness of breath and chest pain.  Likely secondary to ILD flare-up.  -CT angio negative for PE.  -Steroids as above.    -Continuous pulse oximetry.  Maintain O2 saturations > 92%.

## 2024-09-25 NOTE — ASSESSMENT & PLAN NOTE
-Presents with 2 day history of chest pain.  Troponin negative, EKG nonischemic.    -D-dimer elevated, CT angio negative for PE but does redemonstrate interstitial lung disease.  -Chest pain likely pleuritic and musculoskeletal in the setting of increased work of breathing due to ILD flare-up.  -Management of ILD as above.  -As needed pain control.  -Monitor on telemetry.  Continuous pulse oximetry.

## 2024-09-25 NOTE — ED PROVIDER NOTES
"Encounter Date: 9/25/2024       History     Chief Complaint   Patient presents with    Chest Pain     Pt BIB EMS, c/o CP, SOB, and dizziness. Pt was brought in from . Pt given 2.5mg albuterol at . Pt was seen, treated, and discharged from ED on yesterday for same complaint. Pt denies any abd pain or n/v/d     Patient is a 73-year-old male with a past medical history of hyperlipidemia, hypertension, CAD status post LAD stent, interstitial lung disease, type 2 diabetes who presents via EMS with complaints of shortness of breath and chest pain.  Patient states that he has had worsening shortness of breath over the past few months.  Recently diagnosed with interstitial lung disease, with imaging in July of this year.  Was recently on a steroid taper but has now finished his course.  States that the chest pain is worse with palpation, coughing.  Of note patient was seen in the ED last night, workup unremarkable and discharged home.  Recent cardiac catheterization on the 10th of this month, with nonobstructing cardiac disease and patent LAD stent.  Denies fevers, chills, nausea, vomiting.         Review of patient's allergies indicates:   Allergen Reactions    Dapagliflozin      Other reaction(s): Other (See Comments)    Pcn [penicillins]     Linagliptin Other (See Comments)     "it knocked me down", "it almost killed me"    Lisinopril Other (See Comments)     cough    Pantoprazole Hives     Past Medical History:   Diagnosis Date    CAD (coronary artery disease)     Sees [facility], h/o stent    Diabetes mellitus     Hypertension     Kidney stone     Stroke     age 62     Past Surgical History:   Procedure Laterality Date    CARDIAC CATHETERIZATION      with stent    COLONOSCOPY N/A 03/27/2024    Procedure: COLONOSCOPY;  Surgeon: Ariela Castro MD;  Location: [facility];  Service: Endoscopy;  Laterality: N/A;    ESOPHAGOGASTRODUODENOSCOPY N/A 03/27/2024    Procedure: EGD (ESOPHAGOGASTRODUODENOSCOPY);  Surgeon: " Ariela Castro MD;  Location: [facility] ENDO;  Service: Endoscopy;  Laterality: N/A;    LEFT HEART CATHETERIZATION Left 9/10/2024    Procedure: Left heart cath;  Surgeon: Jemal Drummond MD;  Location: [facility] CATH LAB;  Service: Cardiology;  Laterality: Left;    SHOULDER SURGERY Right      Family History   Problem Relation Name Age of Onset    Cancer Mother      Colon cancer Sister      Esophageal cancer Neg Hx       Social History     Tobacco Use    Smoking status: Never     Passive exposure: Never    Smokeless tobacco: Never   Substance Use Topics    Alcohol use: No    Drug use: Never     Review of Systems   Constitutional:  Positive for activity change. Negative for chills and fever.   Respiratory:  Positive for cough, chest tightness, shortness of breath and wheezing.    Cardiovascular:  Positive for chest pain. Negative for leg swelling.   Gastrointestinal:  Negative for abdominal pain and nausea.   Neurological:  Negative for dizziness and weakness.       Physical Exam     Initial Vitals [09/25/24 1114]   BP Pulse Resp Temp SpO2   (!) 155/95 102 20 98.1 °F (36.7 °C) 98 %      MAP       --         Physical Exam    Nursing note and vitals reviewed.  Constitutional: He appears well-developed and well-nourished. He is not diaphoretic.   HENT:   Head: Normocephalic and atraumatic.   Right Ear: External ear normal.   Left Ear: External ear normal.   Eyes: Right eye exhibits no discharge. Left eye exhibits no discharge. No scleral icterus.   Neck: Neck supple. No tracheal deviation present. No JVD present.   Normal range of motion.  Cardiovascular:    Tachycardia present.         No murmur heard.  Pulmonary/Chest: No stridor. He is in respiratory distress. He has wheezes. He has rales. He exhibits tenderness (chest wall tenderness).   Abdominal: Abdomen is soft. He exhibits no distension. There is no abdominal tenderness.   Musculoskeletal:         General: No edema. Normal range of motion.      Cervical back:  Normal range of motion and neck supple.     Neurological: He is alert and oriented to person, place, and time.   Skin: Skin is warm and dry.   Psychiatric: His behavior is normal. Thought content normal. His mood appears anxious.         ED Course   Critical Care    Date/Time: 9/25/2024 3:55 PM    Performed by: Daphne Garnica PA-C  Authorized by: Bony Ko MD  Total critical care time (exclusive of procedural time) : 0 minutes  Comments: Please put in 35 minutes of critical care due to patient having a high risk of respiratory failure.   Separate from teaching and exclusive of procedure and ekg time  Includes:  Time at bedside  Time reviewing test results  Time discussing case with staff  Time documenting the medical record  Time spent with family members  Time spent with consults  Management        Labs Reviewed   CBC W/ AUTO DIFFERENTIAL - Abnormal       Result Value    WBC 10.39      RBC 5.11      Hemoglobin 14.0      Hematocrit 42.5      MCV 83      MCH 27.4      MCHC 32.9      RDW 13.6      Platelets 250      MPV 9.9      Immature Granulocytes 0.4      Gran # (ANC) 8.6 (*)     Immature Grans (Abs) 0.04      Lymph # 0.9 (*)     Mono # 0.6      Eos # 0.3      Baso # 0.02      nRBC 0      Gran % 82.3 (*)     Lymph % 8.3 (*)     Mono % 6.0      Eosinophil % 2.8      Basophil % 0.2      Differential Method Automated     B-TYPE NATRIURETIC PEPTIDE - Abnormal     (*)    D DIMER, QUANTITATIVE - Abnormal    D-Dimer 0.85 (*)    ISTAT PROCEDURE - Abnormal    POC PH 7.389      POC PCO2 50.8 (*)     POC PO2 16 (*)     POC HCO3 30.7 (*)     POC BE 4 (*)     POC SATURATED O2 20      POC TCO2 32 (*)     Sample VENOUS      Site Other      Allens Test N/A     COMPREHENSIVE METABOLIC PANEL    Sodium 138      Potassium 4.2      Chloride 102      CO2 24      Glucose 100      BUN 9      Creatinine 1.0      Calcium 10.2      Total Protein 8.0      Albumin 3.8      Total Bilirubin 0.6      Alkaline Phosphatase 76      AST  21      ALT 19      eGFR >60      Anion Gap 12     TROPONIN I    Troponin I <0.006     POCT GLUCOSE    POCT Glucose 107       EKG Readings: (Independently Interpreted)   Initial Reading: No STEMI. Rhythm: Normal Sinus Rhythm. Heart Rate: 100. Axis: Normal.       Imaging Results              CTA Chest Non-Coronary (PE Studies) (In process)                      Medications   albuterol-ipratropium 2.5 mg-0.5 mg/3 mL nebulizer solution 3 mL (3 mLs Nebulization Given 9/25/24 1155)   methylPREDNISolone sodium succinate injection 125 mg (125 mg Intravenous Given 9/25/24 1243)   magnesium sulfate 2g in water 50mL IVPB (premix) (0 g Intravenous Stopped 9/25/24 1449)   iohexoL (OMNIPAQUE 350) injection 85 mL (85 mLs Intravenous Given 9/25/24 1457)     Medical Decision Making  This is an emergent evaluation of a 73 y.o. male presenting to the ED with complaints of shortness a breath and chest pain.  Chest pain not consistent with ACS as troponin negative.  EKG shows no STEMI or other malignant arrhythmias. No evidence of aortic dissection, PTX, or PNA on CXR. CXR consistent with prominent interstitial markings suggesting fibrotic change. On chart review, CT chest consistent with ILD with honeycombing pattern. Following IV solumedrol, IV mag and herson-nebs x 3 patient is improving but still SOB. Ambulated patient and desatted to 88% on home 2L of O2, HR elevated to 115 while ambulating. Ddimer elevated at 0.85, CTA negative for PE. After discussion with Dr. Ko, it was determined that patient should be admitted for management of an exacerbation of ILD. I discussed with the patient the diagnosis, treatment plan. The patient verbalized an understanding. The patient is asked if there are any questions or concerns. We discuss the case, until all issues are addressed to the patient's satisfaction. Patient understands and is agreeable to the plan.     Amount and/or Complexity of Data Reviewed  Labs: ordered.  Radiology:  ordered.    Risk  Prescription drug management.  Decision regarding hospitalization.               ED Course as of 09/25/24 1633   Wed Sep 25, 2024   1411 Attempted ambulation with patient, desatting to 86% with heart rates increasing to 115 beats per minute.  [SL]      ED Course User Index  [SL] Daphne Garnica PA-C                           Clinical Impression:  Final diagnoses:  [R06.02] SOB (shortness of breath)  [J84.9] Interstitial lung disease (Primary)  [R79.89] Elevated d-dimer          ED Disposition Condition    Admit Stable                Daphne Garnica PA-C  09/26/24 1246

## 2024-09-25 NOTE — DISCHARGE INSTRUCTIONS
Take your Prilosec.  You can also take Tums and Maalox or Pepto-Bismol over-the-counter for this pain if it recurs.  If you do continued to have persistent pain and in his not improving with these medications.  She was either return or see your cardiologist as soon as possible.  Should also see your regular physician as scheduled.  Thank you.

## 2024-09-26 ENCOUNTER — TELEPHONE (OUTPATIENT)
Dept: FAMILY MEDICINE | Facility: CLINIC | Age: 74
End: 2024-09-26
Payer: MEDICARE

## 2024-09-26 LAB
ALBUMIN SERPL BCP-MCNC: 3.4 G/DL (ref 3.5–5.2)
ALP SERPL-CCNC: 60 U/L (ref 55–135)
ALT SERPL W/O P-5'-P-CCNC: 12 U/L (ref 10–44)
ANION GAP SERPL CALC-SCNC: 11 MMOL/L (ref 8–16)
AST SERPL-CCNC: 13 U/L (ref 10–40)
BASOPHILS # BLD AUTO: 0 K/UL (ref 0–0.2)
BASOPHILS NFR BLD: 0 % (ref 0–1.9)
BILIRUB SERPL-MCNC: 0.6 MG/DL (ref 0.1–1)
BUN SERPL-MCNC: 18 MG/DL (ref 8–23)
CALCIUM SERPL-MCNC: 9.7 MG/DL (ref 8.7–10.5)
CHLORIDE SERPL-SCNC: 103 MMOL/L (ref 95–110)
CO2 SERPL-SCNC: 22 MMOL/L (ref 23–29)
CREAT SERPL-MCNC: 1.2 MG/DL (ref 0.5–1.4)
DIFFERENTIAL METHOD BLD: ABNORMAL
EOSINOPHIL # BLD AUTO: 0 K/UL (ref 0–0.5)
EOSINOPHIL NFR BLD: 0 % (ref 0–8)
ERYTHROCYTE [DISTWIDTH] IN BLOOD BY AUTOMATED COUNT: 13.4 % (ref 11.5–14.5)
EST. GFR  (NO RACE VARIABLE): >60 ML/MIN/1.73 M^2
ESTIMATED AVG GLUCOSE: 183 MG/DL (ref 68–131)
GLUCOSE SERPL-MCNC: 364 MG/DL (ref 70–110)
HBA1C MFR BLD: 8 % (ref 4–5.6)
HCT VFR BLD AUTO: 38.4 % (ref 40–54)
HGB BLD-MCNC: 13.1 G/DL (ref 14–18)
IMM GRANULOCYTES # BLD AUTO: 0.02 K/UL (ref 0–0.04)
IMM GRANULOCYTES NFR BLD AUTO: 0.3 % (ref 0–0.5)
LYMPHOCYTES # BLD AUTO: 0.9 K/UL (ref 1–4.8)
LYMPHOCYTES NFR BLD: 14.7 % (ref 18–48)
MAGNESIUM SERPL-MCNC: 1.9 MG/DL (ref 1.6–2.6)
MCH RBC QN AUTO: 27.9 PG (ref 27–31)
MCHC RBC AUTO-ENTMCNC: 34.1 G/DL (ref 32–36)
MCV RBC AUTO: 82 FL (ref 82–98)
MONOCYTES # BLD AUTO: 0.2 K/UL (ref 0.3–1)
MONOCYTES NFR BLD: 2.4 % (ref 4–15)
NEUTROPHILS # BLD AUTO: 5.2 K/UL (ref 1.8–7.7)
NEUTROPHILS NFR BLD: 82.6 % (ref 38–73)
NRBC BLD-RTO: 0 /100 WBC
OHS QRS DURATION: 92 MS
OHS QRS DURATION: 94 MS
OHS QTC CALCULATION: 445 MS
OHS QTC CALCULATION: 457 MS
PLATELET # BLD AUTO: 229 K/UL (ref 150–450)
PMV BLD AUTO: 10.3 FL (ref 9.2–12.9)
POCT GLUCOSE: 303 MG/DL (ref 70–110)
POCT GLUCOSE: 341 MG/DL (ref 70–110)
POCT GLUCOSE: 397 MG/DL (ref 70–110)
POCT GLUCOSE: 401 MG/DL (ref 70–110)
POTASSIUM SERPL-SCNC: 4 MMOL/L (ref 3.5–5.1)
PROT SERPL-MCNC: 7.1 G/DL (ref 6–8.4)
RBC # BLD AUTO: 4.69 M/UL (ref 4.6–6.2)
SODIUM SERPL-SCNC: 136 MMOL/L (ref 136–145)
WBC # BLD AUTO: 6.25 K/UL (ref 3.9–12.7)

## 2024-09-26 PROCEDURE — 21400001 HC TELEMETRY ROOM

## 2024-09-26 PROCEDURE — 63600175 PHARM REV CODE 636 W HCPCS: Performed by: STUDENT IN AN ORGANIZED HEALTH CARE EDUCATION/TRAINING PROGRAM

## 2024-09-26 PROCEDURE — 63600175 PHARM REV CODE 636 W HCPCS: Performed by: INTERNAL MEDICINE

## 2024-09-26 PROCEDURE — 83735 ASSAY OF MAGNESIUM: CPT | Performed by: STUDENT IN AN ORGANIZED HEALTH CARE EDUCATION/TRAINING PROGRAM

## 2024-09-26 PROCEDURE — 99223 1ST HOSP IP/OBS HIGH 75: CPT | Mod: ,,, | Performed by: INTERNAL MEDICINE

## 2024-09-26 PROCEDURE — 25000242 PHARM REV CODE 250 ALT 637 W/ HCPCS: Performed by: INTERNAL MEDICINE

## 2024-09-26 PROCEDURE — 25000003 PHARM REV CODE 250: Performed by: STUDENT IN AN ORGANIZED HEALTH CARE EDUCATION/TRAINING PROGRAM

## 2024-09-26 PROCEDURE — 94761 N-INVAS EAR/PLS OXIMETRY MLT: CPT

## 2024-09-26 PROCEDURE — 94640 AIRWAY INHALATION TREATMENT: CPT

## 2024-09-26 PROCEDURE — 36415 COLL VENOUS BLD VENIPUNCTURE: CPT | Performed by: STUDENT IN AN ORGANIZED HEALTH CARE EDUCATION/TRAINING PROGRAM

## 2024-09-26 PROCEDURE — 80053 COMPREHEN METABOLIC PANEL: CPT | Performed by: STUDENT IN AN ORGANIZED HEALTH CARE EDUCATION/TRAINING PROGRAM

## 2024-09-26 PROCEDURE — 85025 COMPLETE CBC W/AUTO DIFF WBC: CPT | Performed by: STUDENT IN AN ORGANIZED HEALTH CARE EDUCATION/TRAINING PROGRAM

## 2024-09-26 PROCEDURE — 25000003 PHARM REV CODE 250: Performed by: INTERNAL MEDICINE

## 2024-09-26 RX ORDER — METOPROLOL SUCCINATE 50 MG/1
50 TABLET, EXTENDED RELEASE ORAL DAILY
Status: DISCONTINUED | OUTPATIENT
Start: 2024-09-27 | End: 2024-09-28 | Stop reason: HOSPADM

## 2024-09-26 RX ORDER — INSULIN GLARGINE 100 [IU]/ML
5 INJECTION, SOLUTION SUBCUTANEOUS 2 TIMES DAILY
Status: DISCONTINUED | OUTPATIENT
Start: 2024-09-26 | End: 2024-09-26

## 2024-09-26 RX ORDER — GUAIFENESIN 100 MG/5ML
400 SOLUTION ORAL EVERY 4 HOURS PRN
Status: DISCONTINUED | OUTPATIENT
Start: 2024-09-26 | End: 2024-09-28 | Stop reason: HOSPADM

## 2024-09-26 RX ORDER — INSULIN GLARGINE 100 [IU]/ML
15 INJECTION, SOLUTION SUBCUTANEOUS ONCE
Status: COMPLETED | OUTPATIENT
Start: 2024-09-26 | End: 2024-09-26

## 2024-09-26 RX ORDER — MYCOPHENOLATE MOFETIL 250 MG/1
500 CAPSULE ORAL 2 TIMES DAILY
Status: DISCONTINUED | OUTPATIENT
Start: 2024-09-26 | End: 2024-09-28 | Stop reason: HOSPADM

## 2024-09-26 RX ORDER — INSULIN GLARGINE 100 [IU]/ML
10 INJECTION, SOLUTION SUBCUTANEOUS 2 TIMES DAILY
Status: DISCONTINUED | OUTPATIENT
Start: 2024-09-26 | End: 2024-09-27

## 2024-09-26 RX ADMIN — METHYLPREDNISOLONE SODIUM SUCCINATE 60 MG: 125 INJECTION, POWDER, FOR SOLUTION INTRAMUSCULAR; INTRAVENOUS at 11:09

## 2024-09-26 RX ADMIN — INSULIN ASPART 4 UNITS: 100 INJECTION, SOLUTION INTRAVENOUS; SUBCUTANEOUS at 04:09

## 2024-09-26 RX ADMIN — INSULIN ASPART 5 UNITS: 100 INJECTION, SOLUTION INTRAVENOUS; SUBCUTANEOUS at 12:09

## 2024-09-26 RX ADMIN — METHYLPREDNISOLONE SODIUM SUCCINATE 60 MG: 125 INJECTION, POWDER, FOR SOLUTION INTRAMUSCULAR; INTRAVENOUS at 12:09

## 2024-09-26 RX ADMIN — TAMSULOSIN HYDROCHLORIDE 0.4 MG: 0.4 CAPSULE ORAL at 08:09

## 2024-09-26 RX ADMIN — FUROSEMIDE 20 MG: 20 TABLET ORAL at 08:09

## 2024-09-26 RX ADMIN — METHYLPREDNISOLONE SODIUM SUCCINATE 60 MG: 125 INJECTION, POWDER, FOR SOLUTION INTRAMUSCULAR; INTRAVENOUS at 05:09

## 2024-09-26 RX ADMIN — MYCOPHENOLATE MOFETIL 500 MG: 250 CAPSULE ORAL at 08:09

## 2024-09-26 RX ADMIN — ASPIRIN 81 MG CHEWABLE TABLET 81 MG: 81 TABLET CHEWABLE at 08:09

## 2024-09-26 RX ADMIN — MORPHINE SULFATE 2 MG: 4 INJECTION, SOLUTION INTRAMUSCULAR; INTRAVENOUS at 07:09

## 2024-09-26 RX ADMIN — ATORVASTATIN CALCIUM 40 MG: 40 TABLET, FILM COATED ORAL at 08:09

## 2024-09-26 RX ADMIN — IPRATROPIUM BROMIDE AND ALBUTEROL SULFATE 3 ML: 2.5; .5 SOLUTION RESPIRATORY (INHALATION) at 07:09

## 2024-09-26 RX ADMIN — FAMOTIDINE 20 MG: 10 INJECTION, SOLUTION INTRAVENOUS at 08:09

## 2024-09-26 RX ADMIN — INSULIN GLARGINE 15 UNITS: 100 INJECTION, SOLUTION SUBCUTANEOUS at 04:09

## 2024-09-26 RX ADMIN — INSULIN ASPART 4 UNITS: 100 INJECTION, SOLUTION INTRAVENOUS; SUBCUTANEOUS at 08:09

## 2024-09-26 RX ADMIN — GUAIFENESIN 400 MG: 200 SOLUTION ORAL at 08:09

## 2024-09-26 RX ADMIN — OXYBUTYNIN CHLORIDE 5 MG: 5 TABLET, EXTENDED RELEASE ORAL at 08:09

## 2024-09-26 RX ADMIN — IPRATROPIUM BROMIDE AND ALBUTEROL SULFATE 3 ML: 2.5; .5 SOLUTION RESPIRATORY (INHALATION) at 02:09

## 2024-09-26 RX ADMIN — INSULIN ASPART 3 UNITS: 100 INJECTION, SOLUTION INTRAVENOUS; SUBCUTANEOUS at 08:09

## 2024-09-26 RX ADMIN — HEPARIN SODIUM 5000 UNITS: 5000 INJECTION INTRAVENOUS; SUBCUTANEOUS at 09:09

## 2024-09-26 RX ADMIN — SACUBITRIL AND VALSARTAN 1 TABLET: 24; 26 TABLET, FILM COATED ORAL at 08:09

## 2024-09-26 RX ADMIN — HEPARIN SODIUM 5000 UNITS: 5000 INJECTION INTRAVENOUS; SUBCUTANEOUS at 05:09

## 2024-09-26 RX ADMIN — INSULIN GLARGINE 10 UNITS: 100 INJECTION, SOLUTION SUBCUTANEOUS at 08:09

## 2024-09-26 RX ADMIN — INSULIN GLARGINE 5 UNITS: 100 INJECTION, SOLUTION SUBCUTANEOUS at 08:09

## 2024-09-26 RX ADMIN — CARVEDILOL 25 MG: 12.5 TABLET, FILM COATED ORAL at 08:09

## 2024-09-26 RX ADMIN — HEPARIN SODIUM 5000 UNITS: 5000 INJECTION INTRAVENOUS; SUBCUTANEOUS at 02:09

## 2024-09-26 RX ADMIN — IPRATROPIUM BROMIDE AND ALBUTEROL SULFATE 3 ML: 2.5; .5 SOLUTION RESPIRATORY (INHALATION) at 08:09

## 2024-09-26 RX ADMIN — AMLODIPINE BESYLATE 10 MG: 5 TABLET ORAL at 08:09

## 2024-09-26 RX ADMIN — CHOLECALCIFEROL TAB 25 MCG (1000 UNIT) 2000 UNITS: 25 TAB at 08:09

## 2024-09-26 NOTE — SUBJECTIVE & OBJECTIVE
"Past Medical History:   Diagnosis Date    CAD (coronary artery disease)     Sees [facility], h/o stent    Diabetes mellitus     Hypertension     Kidney stone     Stroke     age 62       Past Surgical History:   Procedure Laterality Date    CARDIAC CATHETERIZATION      with stent    COLONOSCOPY N/A 03/27/2024    Procedure: COLONOSCOPY;  Surgeon: Ariela Castro MD;  Location: [facility] ENDO;  Service: Endoscopy;  Laterality: N/A;    ESOPHAGOGASTRODUODENOSCOPY N/A 03/27/2024    Procedure: EGD (ESOPHAGOGASTRODUODENOSCOPY);  Surgeon: Ariela Castro MD;  Location: [facility] ENDO;  Service: Endoscopy;  Laterality: N/A;    LEFT HEART CATHETERIZATION Left 9/10/2024    Procedure: Left heart cath;  Surgeon: Jemal Drummond MD;  Location: [facility] CATH LAB;  Service: Cardiology;  Laterality: Left;    SHOULDER SURGERY Right        Review of patient's allergies indicates:   Allergen Reactions    Dapagliflozin      Other reaction(s): Other (See Comments)    Pcn [penicillins]     Linagliptin Other (See Comments)     "it knocked me down", "it almost killed me"    Lisinopril Other (See Comments)     cough    Pantoprazole Hives       Family History       Problem Relation (Age of Onset)    Cancer Mother    Colon cancer Sister          Tobacco Use    Smoking status: Never     Passive exposure: Never    Smokeless tobacco: Never   Substance and Sexual Activity    Alcohol use: No    Drug use: Never    Sexual activity: Not Currently         Review of Systems  Objective:     Vital Signs (Most Recent):  Temp: 98.1 °F (36.7 °C) (09/26/24 1137)  Pulse: 83 (09/26/24 1405)  Resp: 18 (09/26/24 1405)  BP: 138/84 (09/26/24 1137)  SpO2: 96 % (09/26/24 1405) Vital Signs (24h Range):  Temp:  [97.5 °F (36.4 °C)-98.1 °F (36.7 °C)] 98.1 °F (36.7 °C)  Pulse:  [] 83  Resp:  [16-25] 18  SpO2:  [92 %-99 %] 96 %  BP: (136-164)/() 138/84     Weight: 59.9 kg (132 lb 0.9 oz)  Body mass index is 20.08 kg/m².      Intake/Output Summary (Last 24 hours) " at 9/26/2024 1556  Last data filed at 9/26/2024 0937  Gross per 24 hour   Intake 120 ml   Output 1250 ml   Net -1130 ml        Physical Exam  Vitals reviewed.   Constitutional:       Appearance: Normal appearance.   HENT:      Mouth/Throat:      Mouth: Mucous membranes are moist.   Eyes:      Extraocular Movements: Extraocular movements intact.      Pupils: Pupils are equal, round, and reactive to light.   Cardiovascular:      Rate and Rhythm: Normal rate and regular rhythm.   Pulmonary:      Effort: Pulmonary effort is normal.      Breath sounds: Wheezing (with inspiratory squeaks) and rales present.   Musculoskeletal:      Right lower leg: No edema.      Left lower leg: No edema.   Skin:     General: Skin is warm and dry.      Capillary Refill: Capillary refill takes less than 2 seconds.   Neurological:      General: No focal deficit present.      Mental Status: He is alert and oriented to person, place, and time.   Psychiatric:         Mood and Affect: Mood normal.         Behavior: Behavior normal.          Vents:  Oxygen Concentration (%): 2 (09/25/24 1152)    Lines/Drains/Airways       Peripheral Intravenous Line  Duration                  Peripheral IV - Single Lumen 09/25/24 1117 18 G Left Antecubital 1 day         Peripheral IV - Single Lumen 09/25/24 1159 20 G Right Antecubital 1 day                    Significant Labs:    CBC/Anemia Profile:  Recent Labs   Lab 09/24/24  1809 09/25/24  1159 09/26/24  0456   WBC 8.34 10.39 6.25   HGB 13.2* 14.0 13.1*   HCT 39.3* 42.5 38.4*    250 229   MCV 84 83 82   RDW 13.5 13.6 13.4        Chemistries:  Recent Labs   Lab 09/24/24  1809 09/25/24  1159 09/26/24  0456    138 136   K 4.5 4.2 4.0    102 103   CO2 22* 24 22*   BUN 11 9 18   CREATININE 1.1 1.0 1.2   CALCIUM 9.8 10.2 9.7   ALBUMIN 3.5 3.8 3.4*   PROT 7.2 8.0 7.1   BILITOT 0.5 0.6 0.6   ALKPHOS 63 76 60   ALT 18 19 12   AST 21 21 13   MG  --   --  1.9       Cardiac Markers: No results for  "input(s): "CKMB", "TROPONINT", "MYOGLOBIN" in the last 48 hours.  All pertinent labs within the past 24 hours have been reviewed.    Significant Imaging:   I have reviewed all pertinent imaging results/findings within the past 24 hours.  CT: I have reviewed all pertinent results/findings within the past 24 hours and my personal findings are:  Possible UIP pattern ILD, no significant GGO or evidence for acute infection or exacerbation  Echo: I have reviewed all pertinent results/findings within the past 24 hours and my personal findings are:  reduced EF  "

## 2024-09-26 NOTE — ASSESSMENT & PLAN NOTE
Can switch to Pred 20mg daily for 10 days. Restarting CellCept as ordered by Dr Schmitt. Needs to get his outpatient PFTs so can proceed with [facility] as outpatient.  -can sometimes have reactive airways with CHP so can continue treatments as ordered by his outpatient pulmonologist  -would consider switching coreg to a beta1 selective beta blocker.

## 2024-09-26 NOTE — NURSING
PER handoff received from KANDI Garcia     Pt resting in bed quietly. NAD noted. No c/o pain.     Fall and safety precautions maintained. Bed alarm activated and audible.. Bed locked in lowest position, with side rails up x2. Call bell and personal items within reach

## 2024-09-26 NOTE — NURSING
[facility]  Nurses Note -- 4 Eyes      9/26/2024       Skin assessed on: Q Shift      [x] No Pressure Injuries Present    [x]Prevention Measures Documented    [] Yes LDA  for Pressure Injury Previously documented     [] Yes New Pressure Injury Discovered   [] LDA for New Pressure Injury Added      Attending RN:  Marta Bryant RN     Second RN:  KANDI Garcia

## 2024-09-26 NOTE — SUBJECTIVE & OBJECTIVE
Interval History:  Was seen and examined follow-up visit.  He is alert and oriented, anxious and emotional.  Respiratory status seems to be stable, he is on room air, not wheezing and moving air freely bilaterally.  Patient requested me to talk to patient's son on the phone, we discussed with son his condition, treatment and discharge planning.  Son admits that patient may not have a good support system at home, he lives by himself, recently frequently comes to the emergency room.  Pulmonologist evaluated patient recommended to deescalate steroids to prednisone 20 for 10 days, plan will be to correct blood glucose and discharge planning home with home health tomorrow if continues to feel better.    Review of Systems   Constitutional:  Positive for activity change (Decreased), appetite change (Decreased) and fatigue.   HENT:  Negative for congestion.    Eyes:  Negative for visual disturbance.   Respiratory:  Positive for chest tightness. Negative for cough and shortness of breath.    Cardiovascular:  Negative for chest pain and palpitations.   Gastrointestinal:  Negative for abdominal pain, nausea and vomiting.   Endocrine: Negative.    Genitourinary:  Negative for dysuria.   Musculoskeletal:  Negative for arthralgias and neck pain.   Skin:  Negative for color change.   Allergic/Immunologic: Negative.    Neurological:  Positive for headaches. Negative for speech difficulty.   Hematological: Negative.    Psychiatric/Behavioral:  Negative for agitation.      Objective:     Vital Signs (Most Recent):  Temp: 98.1 °F (36.7 °C) (09/26/24 1137)  Pulse: 83 (09/26/24 1405)  Resp: 18 (09/26/24 1405)  BP: 138/84 (09/26/24 1137)  SpO2: 96 % (09/26/24 1405) Vital Signs (24h Range):  Temp:  [97.5 °F (36.4 °C)-98.1 °F (36.7 °C)] 98.1 °F (36.7 °C)  Pulse:  [] 83  Resp:  [16-25] 18  SpO2:  [92 %-99 %] 96 %  BP: (136-164)/() 138/84     Weight: 59.9 kg (132 lb 0.9 oz)  Body mass index is 20.08 kg/m².    Intake/Output  Summary (Last 24 hours) at 9/26/2024 1625  Last data filed at 9/26/2024 0937  Gross per 24 hour   Intake 120 ml   Output 1250 ml   Net -1130 ml         Physical Exam  Vitals and nursing note reviewed.   Constitutional:       Appearance: He is ill-appearing.   HENT:      Head: Normocephalic and atraumatic.      Nose: Nose normal.      Mouth/Throat:      Mouth: Mucous membranes are moist.   Eyes:      Pupils: Pupils are equal, round, and reactive to light.   Cardiovascular:      Rate and Rhythm: Regular rhythm. Tachycardia present.      Pulses: Normal pulses.      Heart sounds: Normal heart sounds.   Pulmonary:      Effort: Respiratory distress (Diffuse crackles over the entire lung field) present.   Abdominal:      General: Abdomen is flat.      Palpations: Abdomen is soft.      Tenderness: There is no abdominal tenderness.   Musculoskeletal:         General: No swelling. Normal range of motion.      Cervical back: Normal range of motion and neck supple.   Skin:     General: Skin is warm.      Capillary Refill: Capillary refill takes less than 2 seconds.   Neurological:      General: No focal deficit present.      Mental Status: He is alert and oriented to person, place, and time.   Psychiatric:         Mood and Affect: Mood normal.             Significant Labs: All pertinent labs within the past 24 hours have been reviewed.  BMP:   Recent Labs   Lab 09/26/24  0456   *      K 4.0      CO2 22*   BUN 18   CREATININE 1.2   CALCIUM 9.7   MG 1.9     CBC:   Recent Labs   Lab 09/24/24  1809 09/25/24  1159 09/26/24  0456   WBC 8.34 10.39 6.25   HGB 13.2* 14.0 13.1*   HCT 39.3* 42.5 38.4*    250 229       Significant Imaging: I have reviewed all pertinent imaging results/findings within the past 24 hours.

## 2024-09-26 NOTE — TELEPHONE ENCOUNTER
----- Message from Cezar Palmer sent at 9/26/2024  1:40 PM CDT -----  Regarding: Emmanuel  Type:Patient Callback     Who called: Emmanuel     What is the request in detail: Pt stated that he would like for the doctor to find him another eye doctor. He needs to get his eye fixed. Please reach out to the patient as soon as possible.     Can the clinic reply by [facility]? No     Would the patient rather a call back or a response via [facility]? Callback     Best call back number: [phone]      Additional Information:

## 2024-09-26 NOTE — PROGRESS NOTES
[facility] Medicine  Progress Note    Patient Name: Emmanuel Grant  MRN: [id number]  Patient Class: IP- Inpatient   Admission Date: 9/25/2024  Length of Stay: 1 days  Attending Physician: Domo Benito MD  Primary Care Provider: Wilfredo De Souza MD        Subjective:     Principal Problem:Interstitial lung disease        HPI:  73-year-old male with PMH of recently diagnosed interstitial lung disease, chronic hypoxic respiratory failure on 2 L oxygen at home, CAD s/p PCI, history of stroke, chronic systolic heart failure, hypertension, diabetes mellitus, hyperlipidemia, GERD, BPH presented to ER with complaints of chest pain.      Patient is a very poor historian and seems to have poor understanding of his medical issues.  Patient reports that he had onset of substernal and epigastric chest pain 2 days ago.  He came to the ER and was discharged home day before yesterday.  His symptoms continued and he decided come to ER for further evaluation today.  On my evaluation patient reports that his chest pain has resolved as of now.  He does report ongoing shortness of breath with even mild exertion and that has been getting worse for last few weeks.  He has been using his nebulizer more frequently with some relief.  Of note he was recently diagnosed with I LD and saw a pulmonologist who started him on CellCept.  Patient states that he only took 1 dose of CellCept which made his head go crazy.  He has been having respiratory issues for last 6 months to 1 year and has been on oxygen for last 3-4 months.  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    On presentation to ER he was hypoxic requiring increased oxygen than his baseline, tachycardic, hypertensive, lab work significant for D-dimer 0.85, CT angio chest was done which was negative for PE but did show chronic interstitial lung disease.  Troponins were negative and EKG  was nonischemic.  He was given a dose of magnesium sulfate, Solu-Medrol and multiple breathing treatments in the ER.  ER requested admitting the patient for ongoing care given increased oxygen requirements.    PMH: Recently diagnosed ILD, chronic hypoxic respiratory failure on 2 L oxygen at home, CAD s/p PCI, history of stroke, chronic systolic heart failure, hypertension, diabetes mellitus, hyperlipidemia, GERD, BPH   PSH: PCI   SH:  Denies smoking but has been exposed to secondhand smoke during going ears.  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  Denies alcohol use, denies recreational drug use.    FH: Reviewed and not pertinent.    Overview/Hospital Course:  No notes on file    Interval History:  Was seen and examined follow-up visit.  He is alert and oriented, anxious and emotional.  Respiratory status seems to be stable, he is on room air, not wheezing and moving air freely bilaterally.  Patient requested me to talk to patient's son on the phone, we discussed with son his condition, treatment and discharge planning.  Son admits that patient may not have a good support system at home, he lives by himself, recently frequently comes to the emergency room.  Pulmonologist evaluated patient recommended to deescalate steroids to prednisone 20 for 10 days, plan will be to correct blood glucose and discharge planning home with home health tomorrow if continues to feel better.    Review of Systems   Constitutional:  Positive for activity change (Decreased), appetite change (Decreased) and fatigue.   HENT:  Negative for congestion.    Eyes:  Negative for visual disturbance.   Respiratory:  Positive for chest tightness. Negative for cough and shortness of breath.    Cardiovascular:  Negative for chest pain and palpitations.   Gastrointestinal:  Negative for abdominal pain, nausea and vomiting.   Endocrine: Negative.    Genitourinary:  Negative for dysuria.   Musculoskeletal:  Negative for arthralgias and neck pain.    Skin:  Negative for color change.   Allergic/Immunologic: Negative.    Neurological:  Positive for headaches. Negative for speech difficulty.   Hematological: Negative.    Psychiatric/Behavioral:  Negative for agitation.      Objective:     Vital Signs (Most Recent):  Temp: 98.1 °F (36.7 °C) (09/26/24 1137)  Pulse: 83 (09/26/24 1405)  Resp: 18 (09/26/24 1405)  BP: 138/84 (09/26/24 1137)  SpO2: 96 % (09/26/24 1405) Vital Signs (24h Range):  Temp:  [97.5 °F (36.4 °C)-98.1 °F (36.7 °C)] 98.1 °F (36.7 °C)  Pulse:  [] 83  Resp:  [16-25] 18  SpO2:  [92 %-99 %] 96 %  BP: (136-164)/() 138/84     Weight: 59.9 kg (132 lb 0.9 oz)  Body mass index is 20.08 kg/m².    Intake/Output Summary (Last 24 hours) at 9/26/2024 1625  Last data filed at 9/26/2024 0937  Gross per 24 hour   Intake 120 ml   Output 1250 ml   Net -1130 ml         Physical Exam  Vitals and nursing note reviewed.   Constitutional:       Appearance: He is ill-appearing.   HENT:      Head: Normocephalic and atraumatic.      Nose: Nose normal.      Mouth/Throat:      Mouth: Mucous membranes are moist.   Eyes:      Pupils: Pupils are equal, round, and reactive to light.   Cardiovascular:      Rate and Rhythm: Regular rhythm. Tachycardia present.      Pulses: Normal pulses.      Heart sounds: Normal heart sounds.   Pulmonary:      Effort: Respiratory distress (Diffuse crackles over the entire lung field) present.   Abdominal:      General: Abdomen is flat.      Palpations: Abdomen is soft.      Tenderness: There is no abdominal tenderness.   Musculoskeletal:         General: No swelling. Normal range of motion.      Cervical back: Normal range of motion and neck supple.   Skin:     General: Skin is warm.      Capillary Refill: Capillary refill takes less than 2 seconds.   Neurological:      General: No focal deficit present.      Mental Status: He is alert and oriented to person, place, and time.   Psychiatric:         Mood and Affect: Mood normal.         "     Significant Labs: All pertinent labs within the past 24 hours have been reviewed.  BMP:   Recent Labs   Lab 09/26/24  0456   *      K 4.0      CO2 22*   BUN 18   CREATININE 1.2   CALCIUM 9.7   MG 1.9     CBC:   Recent Labs   Lab 09/24/24  1809 09/25/24  1159 09/26/24  0456   WBC 8.34 10.39 6.25   HGB 13.2* 14.0 13.1*   HCT 39.3* 42.5 38.4*    250 229       Significant Imaging: I have reviewed all pertinent imaging results/findings within the past 24 hours.    Assessment/Plan:      * Interstitial lung disease exacerbation  -Recently diagnosed with interstitial lung disease.  On 2 L oxygen at baseline.  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  -Follows with pulmonology outpatient.  Has received multiple courses of steroid taper.  However his symptoms have been gradually worsening over the last few months.    -Was recently started on CellCept but states that he took only 1 dose and his "head went crazy."  -Given a dose of magnesium sulfate, Solu-Medrol, breathing treatments in the ER.    -Continue IV Solu-Medrol 60 mg q.6 hours.  Continue inhalers and nebulizers as needed.  -Consult pulmonology in a.m..      Chest pain  -Presents with 2 day history of chest pain.  Troponin negative, EKG nonischemic.    -D-dimer elevated, CT angio negative for PE but does redemonstrate interstitial lung disease.  -Chest pain likely pleuritic and musculoskeletal in the setting of increased work of breathing due to ILD flare-up.  -Management of ILD as above.  -As needed pain control.  -Monitor on telemetry.  Continuous pulse oximetry.        Chronic HFrEF (heart failure with reduced ejection fraction)  -Euvolemic.    -Continue Coreg, Lasix, Entresto.        Essential hypertension  -Hypertensive on presentation.    -Continue home amlodipine, Coreg, Entresto.        GERD (gastroesophageal reflux disease)  -Has history of hiatal hernia leading to worsening reflux " symptoms.  Follows with GI.  Plan for possible surgical intervention for hiatal hernia.    -Start IV famotidine.  Reported allergic to pantoprazole.        BPH (benign prostatic hyperplasia)  -Continue Flomax.          Acute on chronic respiratory failure with hypoxia  -On 2 L oxygen at baseline for last 4-6 months due to underlying LD.    -Now requiring 3-4 L oxygen with worsening shortness of breath and chest pain.  Likely secondary to ILD flare-up.  -CT angio negative for PE.  -Steroids as above.    -Continuous pulse oximetry.  Maintain O2 saturations > 92%.        Type 2 diabetes mellitus without complication, without long-term current use of insulin  -Check A1c.   -Continue home glimepiride.  Start sliding scale while inpatient.    -Hold home metformin.        VTE Risk Mitigation (From admission, onward)           Ordered     heparin (porcine) injection 5,000 Units  Every 8 hours         09/25/24 1831     IP VTE HIGH RISK PATIENT  Once         09/25/24 1831     Place sequential compression device  Until discontinued         09/25/24 1831                    Discharge Planning   DERIC: 9/29/2024     Code Status: Full Code   Is the patient medically ready for discharge?:     Reason for patient still in hospital (select all that apply): Patient trending condition  Discharge Plan A: Home                  Domo Benito MD  Department of Hospital Medicine   [facility] [place] - Observation

## 2024-09-26 NOTE — CONSULTS
[facility] - Observation  Pulmonology  Consult Note    Patient Name: Emmanuel Grant  MRN: [id number]  Admission Date: 9/25/2024  Hospital Length of Stay: 1 days  Code Status: Full Code  Attending Physician: Domo Benito MD  Primary Care Provider: Wilfredo De Souza MD   Principal Problem: Interstitial lung disease    Inpatient consult to Pulmonology  Consult performed by: Jeff Waldrop MD  Consult ordered by: Domo Benito MD        Subjective:     HPI:  74 yo male with h/o HFrEF, ILD who presents with BOSTON. He reports that he is getting BOSTON with most activity. It has not gotten significantly worse and it is unclear what led him to coming to the ED. He saw his outpatient pulmonologist and was started on CellCept but he only took a single dose. He is worried about the high milligrams of the medication. He saw an [facility] pulmonologist previously who thought his ILD was drive by reflux. Currently concern is for CHP but without a clear /exposure and he is being started on CellCept. He has never had PFTs and so has not been able to start the process of getting Ofev.    Recent LHC with non-obstructive CAD, elevated LVEDP.    Recent 6MWD shows need for O2 with activity.    Past Medical History:   Diagnosis Date    CAD (coronary artery disease)     Sees [facility], h/o stent    Diabetes mellitus     Hypertension     Kidney stone     Stroke     age 62       Past Surgical History:   Procedure Laterality Date    CARDIAC CATHETERIZATION      with stent    COLONOSCOPY N/A 03/27/2024    Procedure: COLONOSCOPY;  Surgeon: Ariela Castro MD;  Location: [facility] ENDO;  Service: Endoscopy;  Laterality: N/A;    ESOPHAGOGASTRODUODENOSCOPY N/A 03/27/2024    Procedure: EGD (ESOPHAGOGASTRODUODENOSCOPY);  Surgeon: Ariela Castro MD;  Location: [facility] ENDO;  Service: Endoscopy;  Laterality: N/A;    LEFT HEART CATHETERIZATION Left 9/10/2024    Procedure: Left heart cath;  Surgeon: Jemal Drummond MD;  Location: [facility] CATH  "LAB;  Service: Cardiology;  Laterality: Left;    SHOULDER SURGERY Right        Review of patient's allergies indicates:   Allergen Reactions    Dapagliflozin      Other reaction(s): Other (See Comments)    Pcn [penicillins]     Linagliptin Other (See Comments)     "it knocked me down", "it almost killed me"    Lisinopril Other (See Comments)     cough    Pantoprazole Hives       Family History       Problem Relation (Age of Onset)    Cancer Mother    Colon cancer Sister          Tobacco Use    Smoking status: Never     Passive exposure: Never    Smokeless tobacco: Never   Substance and Sexual Activity    Alcohol use: No    Drug use: Never    Sexual activity: Not Currently         Review of Systems  Objective:     Vital Signs (Most Recent):  Temp: 98.1 °F (36.7 °C) (09/26/24 1137)  Pulse: 83 (09/26/24 1405)  Resp: 18 (09/26/24 1405)  BP: 138/84 (09/26/24 1137)  SpO2: 96 % (09/26/24 1405) Vital Signs (24h Range):  Temp:  [97.5 °F (36.4 °C)-98.1 °F (36.7 °C)] 98.1 °F (36.7 °C)  Pulse:  [] 83  Resp:  [16-25] 18  SpO2:  [92 %-99 %] 96 %  BP: (136-164)/() 138/84     Weight: 59.9 kg (132 lb 0.9 oz)  Body mass index is 20.08 kg/m².      Intake/Output Summary (Last 24 hours) at 9/26/2024 1556  Last data filed at 9/26/2024 0937  Gross per 24 hour   Intake 120 ml   Output 1250 ml   Net -1130 ml        Physical Exam  Vitals reviewed.   Constitutional:       Appearance: Normal appearance.   HENT:      Mouth/Throat:      Mouth: Mucous membranes are moist.   Eyes:      Extraocular Movements: Extraocular movements intact.      Pupils: Pupils are equal, round, and reactive to light.   Cardiovascular:      Rate and Rhythm: Normal rate and regular rhythm.   Pulmonary:      Effort: Pulmonary effort is normal.      Breath sounds: Wheezing (with inspiratory squeaks) and rales present.   Musculoskeletal:      Right lower leg: No edema.      Left lower leg: No edema.   Skin:     General: Skin is warm and dry.      Capillary " "Refill: Capillary refill takes less than 2 seconds.   Neurological:      General: No focal deficit present.      Mental Status: He is alert and oriented to person, place, and time.   Psychiatric:         Mood and Affect: Mood normal.         Behavior: Behavior normal.          Vents:  Oxygen Concentration (%): 2 (09/25/24 1152)    Lines/Drains/Airways       Peripheral Intravenous Line  Duration                  Peripheral IV - Single Lumen 09/25/24 1117 18 G Left Antecubital 1 day         Peripheral IV - Single Lumen 09/25/24 1159 20 G Right Antecubital 1 day                    Significant Labs:    CBC/Anemia Profile:  Recent Labs   Lab 09/24/24  1809 09/25/24  1159 09/26/24  0456   WBC 8.34 10.39 6.25   HGB 13.2* 14.0 13.1*   HCT 39.3* 42.5 38.4*    250 229   MCV 84 83 82   RDW 13.5 13.6 13.4        Chemistries:  Recent Labs   Lab 09/24/24 1809 09/25/24 1159 09/26/24  0456    138 136   K 4.5 4.2 4.0    102 103   CO2 22* 24 22*   BUN 11 9 18   CREATININE 1.1 1.0 1.2   CALCIUM 9.8 10.2 9.7   ALBUMIN 3.5 3.8 3.4*   PROT 7.2 8.0 7.1   BILITOT 0.5 0.6 0.6   ALKPHOS 63 76 60   ALT 18 19 12   AST 21 21 13   MG  --   --  1.9       Cardiac Markers: No results for input(s): "CKMB", "TROPONINT", "MYOGLOBIN" in the last 48 hours.  All pertinent labs within the past 24 hours have been reviewed.    Significant Imaging:   I have reviewed all pertinent imaging results/findings within the past 24 hours.  CT: I have reviewed all pertinent results/findings within the past 24 hours and my personal findings are:  Possible UIP pattern ILD, no significant GGO or evidence for acute infection or exacerbation  Echo: I have reviewed all pertinent results/findings within the past 24 hours and my personal findings are:  reduced EF    ABG  Recent Labs   Lab 09/25/24  1203   PH 7.389   PO2 16*   PCO2 50.8*   HCO3 30.7*   BE 4*     Assessment/Plan:     Pulmonary  * Interstitial lung disease exacerbation  Can switch to Pred " 20mg daily for 10 days. Restarting CellCept as ordered by Dr Schmitt. Needs to get his outpatient PFTs so can proceed with Ofev as outpatient.  -can sometimes have reactive airways with CHP so can continue treatments as ordered by his outpatient pulmonologist  -would consider switching coreg to a beta1 selective beta blocker.    Acute on chronic respiratory failure with hypoxia  O2 needs at baseline. Symptoms at baseline. Can resume home O2.    Cardiac/Vascular  Chronic HFrEF (heart failure with reduced ejection fraction)  Consider switching to a beta1 selective beta blocker  Consider increased diuretics given LVEDP elevation 9/10/24          Thank you for your consult. I will sign off. Please contact us if you have any additional questions.     Jeff Waldrop MD  Pulmonology  [facility] [place] - [facility]

## 2024-09-26 NOTE — ASSESSMENT & PLAN NOTE
Consider switching to a beta1 selective beta blocker  Consider increased diuretics given LVEDP elevation 9/10/24

## 2024-09-26 NOTE — ASSESSMENT & PLAN NOTE
-Check A1c.   -Continue home glimepiride.  Start sliding scale while inpatient.    -Hold home metformin.

## 2024-09-26 NOTE — NURSING
[facility]  Nurses Note -- 4 Eyes      9/26/2024       Skin assessed on: Q Shift      [x] No Pressure Injuries Present    [x]Prevention Measures Documented    [] Yes LDA  for Pressure Injury Previously documented     [] Yes New Pressure Injury Discovered   [] LDA for New Pressure Injury Added      Attending RN:  Marta Bryant RN     Second RN:  KANDI Garcia

## 2024-09-26 NOTE — HPI
74 yo male with h/o HFrEF, ILD who presents with BOSTON. He reports that he is getting BOSTON with most activity. It has not gotten significantly worse and it is unclear what led him to coming to the ED. He saw his outpatient pulmonologist and was started on CellCept but he only took a single dose. He is worried about the high milligrams of the medication. He saw an [facility] pulmonologist previously who thought his ILD was drive by reflux. Currently concern is for CHP but without a clear /exposure and he is being started on CellCept. He has never had PFTs and so has not been able to start the process of getting Ofev.    Recent LHC with non-obstructive CAD, elevated LVEDP.    Recent 6MWD shows need for O2 with activity.

## 2024-09-26 NOTE — H&P
[facility]  [facility] Medicine  History & Physical    Patient Name: Emmanuel Grant  MRN: [id number]  Patient Class: IP- Inpatient  Admission Date: 9/25/2024  Attending Physician:  Rafa Reyna MD  Primary Care Provider: Payam Vu Jr., NP         Patient information was obtained from patient and ER records.     Subjective:     Principal Problem:Interstitial lung disease    Chief Complaint:   Chief Complaint   Patient presents with    Chest Pain     Pt BIB EMS, c/o CP, SOB, and dizziness. Pt was brought in from . Pt given 2.5mg albuterol at . Pt was seen, treated, and discharged from ED on yesterday for same complaint. Pt denies any abd pain or n/v/d        HPI: 73-year-old male with PMH of recently diagnosed interstitial lung disease, chronic hypoxic respiratory failure on 2 L oxygen at home, CAD s/p PCI, history of stroke, chronic systolic heart failure, hypertension, diabetes mellitus, hyperlipidemia, GERD, BPH presented to ER with complaints of chest pain.      Patient is a very poor historian and seems to have poor understanding of his medical issues.  Patient reports that he had onset of substernal and epigastric chest pain 2 days ago.  He came to the ER and was discharged home day before yesterday.  His symptoms continued and he decided come to ER for further evaluation today.  On my evaluation patient reports that his chest pain has resolved as of now.  He does report ongoing shortness of breath with even mild exertion and that has been getting worse for last few weeks.  He has been using his nebulizer more frequently with some relief.  Of note he was recently diagnosed with I LD and saw a pulmonologist who started him on CellCept.  Patient states that he only took 1 dose of CellCept which made his head go crazy.  He has been having respiratory issues for last 6 months to 1 year and has been on oxygen for last 3-4 months.  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    On presentation to ER he was hypoxic requiring increased oxygen than his baseline, tachycardic, hypertensive, lab work significant for D-dimer 0.85, CT angio chest was done which was negative for PE but did show chronic interstitial lung disease.  Troponins were negative and EKG was nonischemic.  He was given a dose of magnesium sulfate, Solu-Medrol and multiple breathing treatments in the ER.  ER requested admitting the patient for ongoing care given increased oxygen requirements.    PMH: Recently diagnosed ILD, chronic hypoxic respiratory failure on 2 L oxygen at home, CAD s/p PCI, history of stroke, chronic systolic heart failure, hypertension, diabetes mellitus, hyperlipidemia, GERD, BPH   PSH: PCI   SH:  Denies smoking but has been exposed to secondhand smoke during going ears.  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  Denies alcohol use, denies recreational drug use.    FH: Reviewed and not pertinent.    Past Medical History:   Diagnosis Date    CAD (coronary artery disease)     Sees [facility], h/o stent    Diabetes mellitus     Hypertension     Kidney stone     Stroke     age 62       Past Surgical History:   Procedure Laterality Date    CARDIAC CATHETERIZATION      with stent    COLONOSCOPY N/A 03/27/2024    Procedure: COLONOSCOPY;  Surgeon: Ariela Castro MD;  Location: [facility] ENDO;  Service: Endoscopy;  Laterality: N/A;    ESOPHAGOGASTRODUODENOSCOPY N/A 03/27/2024    Procedure: EGD (ESOPHAGOGASTRODUODENOSCOPY);  Surgeon: Ariela Castro MD;  Location: [facility] ENDO;  Service: Endoscopy;  Laterality: N/A;    LEFT HEART CATHETERIZATION Left 9/10/2024    Procedure: Left heart cath;  Surgeon: Jemal Drummond MD;  Location: [facility] CATH LAB;  Service: Cardiology;  Laterality: Left;    SHOULDER SURGERY Right        Review of patient's allergies indicates:   Allergen Reactions    Dapagliflozin      Other reaction(s): Other (See Comments)  "   Pcn [penicillins]     Linagliptin Other (See Comments)     "it knocked me down", "it almost killed me"    Lisinopril Other (See Comments)     cough    Pantoprazole Hives       Current Facility-Administered Medications on File Prior to Encounter   Medication    [COMPLETED] albuterol nebulizer solution 2.5 mg    [COMPLETED] aluminum-magnesium hydroxide-simethicone 200-200-20 mg/5 mL suspension 30 mL    And    [COMPLETED] LIDOcaine viscous HCl 2% oral solution 15 mL    [COMPLETED] aspirin chewable tablet 243 mg    [COMPLETED] lactated ringers bolus 500 mL     Current Outpatient Medications on File Prior to Encounter   Medication Sig    albuterol-ipratropium (DUO-NEB) 2.5 mg-0.5 mg/3 mL nebulizer solution Take 3 mLs by nebulization every 6 (six) hours as needed for Wheezing. Rescue    alpha-D-galactosidase (BEANO ORAL) Take 1 tablet by mouth 2 (two) times a day.    amLODIPine (NORVASC) 10 MG tablet Take 1 tablet (10 mg total) by mouth once daily.    aspirin 81 MG Chew Take 81 mg by mouth once daily.    atorvastatin (LIPITOR) 40 MG tablet Take 1 tablet (40 mg total) by mouth every evening.    carvediloL (COREG) 25 MG tablet Take 1 tablet (25 mg total) by mouth 2 (two) times daily with meals.    cholecalciferol, vitamin D3, (VITAMIN D3) 50 mcg (2,000 unit) Cap capsule 1 capsule.    furosemide (LASIX) 20 MG tablet Take 1 tablet (20 mg total) by mouth once daily.    glimepiride (AMARYL) 2 MG tablet Take 2 mg by mouth 2 (two) times a day.    metFORMIN (GLUCOPHAGE) 1000 MG tablet Take 1 tablet (1,000 mg total) by mouth 2 (two) times daily with meals.    mirabegron (MYRBETRIQ) 25 mg Tb24 ER tablet Take 1 tablet (25 mg total) by mouth once daily.    sacubitriL-valsartan (ENTRESTO) 24-26 mg per tablet Take 1 tablet by mouth 2 (two) times daily.    tamsulosin (FLOMAX) 0.4 mg Cap Take 1 capsule (0.4 mg total) by mouth once daily.    [DISCONTINUED] benzonatate (TESSALON) 200 MG capsule Take 1 capsule (200 mg total) by mouth 3 " (three) times daily as needed for Cough. (Patient not taking: Reported on 9/25/2024)    [DISCONTINUED] blood sugar diagnostic Strp To check BG 1 times daily, to use with insurance preferred meter (Patient not taking: Reported on 9/25/2024)    [DISCONTINUED] blood sugar diagnostic Strp To check BG 3 times daily, to use with insurance preferred meter (Patient not taking: Reported on 9/25/2024)    [DISCONTINUED] blood-glucose meter kit To check BG 1 times daily, to use with insurance preferred meter (Patient not taking: Reported on 9/25/2024)    [DISCONTINUED] blood-glucose meter,continuous (FREESTYLE RAMBO 3 READER) Misc USE WITH RAMBO 3 SENSORS (Patient not taking: Reported on 9/25/2024)    [DISCONTINUED] blood-glucose sensor (FREESTYLE RAMBO 3 SENSOR) Blanche Change every 14 days (Patient not taking: Reported on 9/25/2024)    [DISCONTINUED] famotidine (PEPCID) 40 MG tablet Take 1 tablet (40 mg total) by mouth nightly as needed for Heartburn. (Patient not taking: Reported on 9/25/2024)    [DISCONTINUED] fluticasone propionate (FLONASE) 50 mcg/actuation nasal spray 1 spray in each nostril Nasally Once a day (Patient not taking: Reported on 9/25/2024)    [DISCONTINUED] fluticasone propionate (FLONASE) 50 mcg/actuation nasal spray 1 spray (50 mcg total) by Each Nostril route once daily. (Patient not taking: Reported on 9/25/2024)    [DISCONTINUED] fluticasone-salmeterol diskus inhaler 250-50 mcg Inhale 1 puff into the lungs 2 (two) times daily. Controller (Patient not taking: Reported on 9/25/2024)    [DISCONTINUED] guaiFENesin-codeine 100-10 mg/5 ml (TUSSI-ORGANIDIN NR)  mg/5 mL syrup Take 5 mLs by mouth nightly as needed for Cough. (Patient not taking: Reported on 9/25/2024)    [DISCONTINUED] insulin lispro (HUMALOG KWIKPEN INSULIN) 100 unit/mL pen INJECT AS NEEDED BEFORE MEALS: 150-200=+2, 201-250=+4; 251-300=+6; 301-350=+8, over 350=+10. MAX DAILY DOSE 30 UNITSunits (Patient not taking: Reported on 9/25/2024)     "[DISCONTINUED] lancets Misc To check BG 1 times daily, to use with insurance preferred meter (Patient not taking: Reported on 9/25/2024)    [DISCONTINUED] lancets Misc To check BG 3 times daily, to use with insurance preferred meter (Patient not taking: Reported on 9/25/2024)    [DISCONTINUED] latanoprost 0.005 % ophthalmic solution Place 1 drop into both eyes every evening. (Patient not taking: Reported on 9/25/2024)    [DISCONTINUED] magnesium citrate solution Take half the bottle for constipation as needed. (Patient not taking: Reported on 9/25/2024)    [DISCONTINUED] montelukast (SINGULAIR) 10 mg tablet Take 10 mg by mouth every evening. (Patient not taking: Reported on 9/25/2024)    [DISCONTINUED] mycophenolate (CELLCEPT) 500 mg Tab Take one tablet daily for 7 days, then take 1 tablet in the morning and one at night for 7 days, then take two tablets in the morning and one at night for 7 days, then take 2 tablets in the morning and 2 tablets at night. (Patient not taking: Reported on 9/25/2024)    [DISCONTINUED] omeprazole (PRILOSEC) 40 MG capsule Take 1 capsule (40 mg total) by mouth once daily. (Patient not taking: Reported on 9/25/2024)    [DISCONTINUED] peg 400/hypromellose/glycerin (DRY EYE RELIEF OPHT) Apply to eye. (Patient not taking: Reported on 9/25/2024)    [DISCONTINUED] pen needle, diabetic (BD ULTRA-FINE HEATHER PEN NEEDLE) 32 gauge x 5/32" Ndle Use to inject 3 (three) times daily before meals. (Patient not taking: Reported on 9/25/2024)    [DISCONTINUED] polyethylene glycol (GLYCOLAX) 17 gram PwPk Take by mouth. (Patient not taking: Reported on 9/25/2024)    [DISCONTINUED] polyethylene glycol (GLYCOLAX) 17 gram/dose powder Take 17 g by mouth daily as needed (constipation). (Patient not taking: Reported on 9/25/2024)    [DISCONTINUED] senna-docusate 8.6-50 mg (SENNA WITH DOCUSATE SODIUM) 8.6-50 mg per tablet Take 1 tablet by mouth once daily. (Patient not taking: Reported on 9/25/2024)    " [DISCONTINUED] sucralfate (CARAFATE) 1 gram tablet Take 1 tablet (1 g total) by mouth 4 (four) times daily. (Patient not taking: Reported on 9/25/2024)     Family History       Problem Relation (Age of Onset)    Cancer Mother    Colon cancer Sister          Tobacco Use    Smoking status: Never     Passive exposure: Never    Smokeless tobacco: Never   Substance and Sexual Activity    Alcohol use: No    Drug use: Never    Sexual activity: Not Currently     Review of Systems   Constitutional:  Positive for activity change (Decreased), appetite change (Decreased) and fatigue.   HENT:  Negative for congestion.    Eyes:  Negative for visual disturbance.   Respiratory:  Positive for cough, chest tightness and shortness of breath.    Cardiovascular:  Positive for chest pain. Negative for palpitations.   Gastrointestinal:  Negative for abdominal pain, nausea and vomiting.   Endocrine: Negative.    Genitourinary:  Negative for dysuria.   Musculoskeletal:  Negative for arthralgias and neck pain.   Skin:  Negative for color change.   Allergic/Immunologic: Negative.    Neurological:  Positive for headaches. Negative for speech difficulty.   Hematological: Negative.    Psychiatric/Behavioral:  Negative for agitation.      Objective:     Vital Signs (Most Recent):  Temp: 98.1 °F (36.7 °C) (09/25/24 1114)  Pulse: 97 (09/25/24 1732)  Resp: (!) 25 (09/25/24 1732)  BP: (!) 162/108 (09/25/24 1732)  SpO2: 99 % (09/25/24 1732) Vital Signs (24h Range):  Temp:  [98.1 °F (36.7 °C)] 98.1 °F (36.7 °C)  Pulse:  [] 97  Resp:  [16-29] 25  SpO2:  [93 %-100 %] 99 %  BP: (138-169)/() 162/108     Weight: 63.5 kg (140 lb)  Body mass index is 21.29 kg/m².     Physical Exam  Vitals and nursing note reviewed.   Constitutional:       Appearance: He is ill-appearing.   HENT:      Head: Normocephalic and atraumatic.      Nose: Nose normal.      Mouth/Throat:      Mouth: Mucous membranes are moist.   Eyes:      Pupils: Pupils are equal, round,  and reactive to light.   Cardiovascular:      Rate and Rhythm: Regular rhythm. Tachycardia present.      Pulses: Normal pulses.      Heart sounds: Normal heart sounds.   Pulmonary:      Effort: Respiratory distress (Diffuse crackles over the entire lung field) present.   Abdominal:      General: Abdomen is flat.      Palpations: Abdomen is soft.      Tenderness: There is no abdominal tenderness.   Musculoskeletal:         General: No swelling. Normal range of motion.      Cervical back: Normal range of motion and neck supple.   Skin:     General: Skin is warm.      Capillary Refill: Capillary refill takes less than 2 seconds.   Neurological:      General: No focal deficit present.      Mental Status: He is alert and oriented to person, place, and time.   Psychiatric:         Mood and Affect: Mood normal.              CRANIAL NERVES     CN III, IV, VI   Pupils are equal, round, and reactive to light.       Significant Labs: All pertinent labs within the past 24 hours have been reviewed.  CBC:   Recent Labs   Lab 09/24/24  1809 09/25/24  1159   WBC 8.34 10.39   HGB 13.2* 14.0   HCT 39.3* 42.5    250     CMP:   Recent Labs   Lab 09/24/24  1809 09/25/24  1159    138   K 4.5 4.2    102   CO2 22* 24   * 100   BUN 11 9   CREATININE 1.1 1.0   CALCIUM 9.8 10.2   PROT 7.2 8.0   ALBUMIN 3.5 3.8   BILITOT 0.5 0.6   ALKPHOS 63 76   AST 21 21   ALT 18 19   ANIONGAP 13 12       Significant Imaging: I have reviewed all pertinent imaging results/findings within the past 24 hours.  Assessment/Plan:     * Interstitial lung disease exacerbation  -Recently diagnosed with interstitial lung disease.  On 2 L oxygen at baseline.  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  -Follows with pulmonology outpatient.  Has received multiple courses of steroid taper.  However his symptoms have been gradually worsening over the last few months.    -Was recently started on CellCept but  "states that he took only 1 dose and his "head went crazy."  -Given a dose of magnesium sulfate, Solu-Medrol, breathing treatments in the ER.    -Continue IV Solu-Medrol 60 mg q.6 hours.  Continue inhalers and nebulizers as needed.  -Consult pulmonology in a.m..      Acute on chronic respiratory failure with hypoxia  -On 2 L oxygen at baseline for last 4-6 months due to underlying LD.    -Now requiring 3-4 L oxygen with worsening shortness of breath and chest pain.  Likely secondary to ILD flare-up.  -CT angio negative for PE.  -Steroids as above.    -Continuous pulse oximetry.  Maintain O2 saturations > 92%.        Chest pain  -Presents with 2 day history of chest pain.  Troponin negative, EKG nonischemic.    -D-dimer elevated, CT angio negative for PE but does redemonstrate interstitial lung disease.  -Chest pain likely pleuritic and musculoskeletal in the setting of increased work of breathing due to ILD flare-up.  -Management of ILD as above.  -As needed pain control.  -Monitor on telemetry.  Continuous pulse oximetry.        Chronic HFrEF (heart failure with reduced ejection fraction)  -Euvolemic.    -Continue Coreg, Lasix, Entresto.        Essential hypertension  -Hypertensive on presentation.    -Continue home amlodipine, Coreg, Entresto.        GERD (gastroesophageal reflux disease)  -Has history of hiatal hernia leading to worsening reflux symptoms.  Follows with GI.  Plan for possible surgical intervention for hiatal hernia.    -Start IV famotidine.  Reported allergic to pantoprazole.        BPH (benign prostatic hyperplasia)  -Continue Flomax.          Type 2 diabetes mellitus without complication, without long-term current use of insulin  -Check A1c.   -Continue home glimepiride.  Start sliding scale while inpatient.    -Hold home metformin.        VTE Risk Mitigation (From admission, onward)           Ordered     heparin (porcine) injection 5,000 Units  Every 8 hours         09/25/24 1831     IP VTE HIGH " RISK PATIENT  Once         09/25/24 1831     Place sequential compression device  Until discontinued         09/25/24 1831                       Sidney Reyna MD  Department of Hospital Medicine  [facility] [facility] Dept

## 2024-09-26 NOTE — ED PROVIDER NOTES
Pt signed out to me at change of shift to follow up PE study and for admission disposition.     CT does not show overt PE, does reveal worsening of ILD.     Spoke w pt at bedside. Pt is speaking full sentences with mild tachypnea, has removed his duoneb. I placed pt back on 2.5L NC. He is awake and alert, family member at bedside.     Discussed plan for admission with HM who will see pt. Of note, pt was to be started on cellcept p pulm clinic visit a few days ago but has not yet started. Loreto Pascal MD, 09/25/2024 8:31 PM         Loreto Pascal MD  09/25/24 2031

## 2024-09-26 NOTE — NURSING
PER handoff given to GARY Grider     Pt resting in bed quietly. NAD noted. No c/o pain.     Fall and safety precautions maintained. Bed alarm activated and audible.. Bed locked in lowest position, with side rails up x2. Call bell and personal items within reach

## 2024-09-26 NOTE — PLAN OF CARE
Case Management Assessment     PCP: Wilfredo De Souza MD      Pharmacy:   [id number] [address] AT [facility] [address] [address]  Phone: [phone] Fax: [phone]        Patient Arrived From: Home  Existing Help at Home: Patient lives alone but has family and friends that help    Barriers to Discharge: None    Discharge Plan:    A. Home    B. Home with family  Spoke with patient via telephone; he stated he lives alone, but has family and friends that are available to assist him when needed.  He stated he is independent with his ADLs and still driving, but he does have a nebulizer, Oxygen, and glucometer.  Patient inquired about assistance with fixing meals and home cleaning; CM explained to patient that would fall under private caregivers, to which he declined due to not being able to afford that.  CM also explained that can contact his insurance to verify what type of meal plans he has available with his benefits. Patient stated he will also need medication for his nebulizer; pt also reported, while he is able to afford his medications at the pharmacy, they are expensive.  CM encouraged patient to speak with his PCP about less expensive alternatives to his current medications.  Pt stated a friend will provide transportation home upon discharge.  CM to continue to assess for and until discharge.    09/26/24 1014   Discharge Assessment   Assessment Type Discharge Planning Assessment   Confirmed/corrected address, phone number and insurance Yes   Confirmed Demographics Correct on Facesheet   Source of Information patient   Does patient/caregiver understand observation status Yes   Reason For Admission Interstitial lung disease exacerbation   People in Home alone   Facility Arrived From: Home   Do you expect to return to your current living situation? Yes   Do you have help at home or someone to help you manage your care at home? Yes    Who are your caregiver(s) and their phone number(s)? Family and friends   Prior to hospitilization cognitive status: Unable to Assess   Equipment Currently Used at Home oxygen;nebulizer;glucometer   Readmission within 30 days? No   Patient currently being followed by outpatient case management? No   Do you currently have service(s) that help you manage your care at home? No   Do you take prescription medications? Yes   Do you have prescription coverage? Yes   Coverage [place] [employer] [facility] CHOICES   Do you have any problems affording any of your prescribed medications? No   Is the patient taking medications as prescribed? yes   Who is going to help you get home at discharge? A friends   How do you get to doctors appointments? car, drives self   Are you on dialysis? No   Do you take coumadin? No   Discharge Plan A Home   Discharge Plan B Home with family   DME Needed Upon Discharge  other (see comments)  (TBD)   Discharge Plan discussed with: Patient   Transition of Care Barriers None

## 2024-09-26 NOTE — ED NOTES
Pt brought to bathroom per request in wheelchair, continuously removing O2 2L, pt reports SOB when ambulated with wheelchair for assistance back to room. Pt placed on O2 2L, reports relief in SOB. Encouraged to continue O2

## 2024-09-27 LAB
ALBUMIN SERPL BCP-MCNC: 3.3 G/DL (ref 3.5–5.2)
ALP SERPL-CCNC: 69 U/L (ref 55–135)
ALT SERPL W/O P-5'-P-CCNC: 14 U/L (ref 10–44)
ANION GAP SERPL CALC-SCNC: 14 MMOL/L (ref 8–16)
AST SERPL-CCNC: 14 U/L (ref 10–40)
BASOPHILS # BLD AUTO: 0.01 K/UL (ref 0–0.2)
BASOPHILS NFR BLD: 0.1 % (ref 0–1.9)
BILIRUB SERPL-MCNC: 0.6 MG/DL (ref 0.1–1)
BUN SERPL-MCNC: 21 MG/DL (ref 8–23)
CALCIUM SERPL-MCNC: 9.5 MG/DL (ref 8.7–10.5)
CHLORIDE SERPL-SCNC: 101 MMOL/L (ref 95–110)
CO2 SERPL-SCNC: 23 MMOL/L (ref 23–29)
CREAT SERPL-MCNC: 0.9 MG/DL (ref 0.5–1.4)
DIFFERENTIAL METHOD BLD: ABNORMAL
EOSINOPHIL # BLD AUTO: 0 K/UL (ref 0–0.5)
EOSINOPHIL NFR BLD: 0.1 % (ref 0–8)
ERYTHROCYTE [DISTWIDTH] IN BLOOD BY AUTOMATED COUNT: 13.5 % (ref 11.5–14.5)
EST. GFR  (NO RACE VARIABLE): >60 ML/MIN/1.73 M^2
GLUCOSE SERPL-MCNC: 244 MG/DL (ref 70–110)
HCT VFR BLD AUTO: 38.7 % (ref 40–54)
HGB BLD-MCNC: 12.9 G/DL (ref 14–18)
IMM GRANULOCYTES # BLD AUTO: 0.06 K/UL (ref 0–0.04)
IMM GRANULOCYTES NFR BLD AUTO: 0.5 % (ref 0–0.5)
LYMPHOCYTES # BLD AUTO: 0.8 K/UL (ref 1–4.8)
LYMPHOCYTES NFR BLD: 6.3 % (ref 18–48)
MAGNESIUM SERPL-MCNC: 1.9 MG/DL (ref 1.6–2.6)
MCH RBC QN AUTO: 27.3 PG (ref 27–31)
MCHC RBC AUTO-ENTMCNC: 33.3 G/DL (ref 32–36)
MCV RBC AUTO: 82 FL (ref 82–98)
MONOCYTES # BLD AUTO: 0.5 K/UL (ref 0.3–1)
MONOCYTES NFR BLD: 3.7 % (ref 4–15)
NEUTROPHILS # BLD AUTO: 11.3 K/UL (ref 1.8–7.7)
NEUTROPHILS NFR BLD: 89.3 % (ref 38–73)
NRBC BLD-RTO: 0 /100 WBC
PLATELET # BLD AUTO: 247 K/UL (ref 150–450)
PMV BLD AUTO: 10.4 FL (ref 9.2–12.9)
POCT GLUCOSE: 276 MG/DL (ref 70–110)
POCT GLUCOSE: 301 MG/DL (ref 70–110)
POCT GLUCOSE: 317 MG/DL (ref 70–110)
POCT GLUCOSE: 389 MG/DL (ref 70–110)
POCT GLUCOSE: 400 MG/DL (ref 70–110)
POTASSIUM SERPL-SCNC: 3.5 MMOL/L (ref 3.5–5.1)
PROT SERPL-MCNC: 7 G/DL (ref 6–8.4)
RBC # BLD AUTO: 4.72 M/UL (ref 4.6–6.2)
SODIUM SERPL-SCNC: 138 MMOL/L (ref 136–145)
WBC # BLD AUTO: 12.66 K/UL (ref 3.9–12.7)

## 2024-09-27 PROCEDURE — 63600175 PHARM REV CODE 636 W HCPCS: Performed by: STUDENT IN AN ORGANIZED HEALTH CARE EDUCATION/TRAINING PROGRAM

## 2024-09-27 PROCEDURE — 25000242 PHARM REV CODE 250 ALT 637 W/ HCPCS: Performed by: INTERNAL MEDICINE

## 2024-09-27 PROCEDURE — 21400001 HC TELEMETRY ROOM

## 2024-09-27 PROCEDURE — 99900031 HC PATIENT EDUCATION (STAT)

## 2024-09-27 PROCEDURE — 63600175 PHARM REV CODE 636 W HCPCS: Performed by: INTERNAL MEDICINE

## 2024-09-27 PROCEDURE — 83735 ASSAY OF MAGNESIUM: CPT | Performed by: STUDENT IN AN ORGANIZED HEALTH CARE EDUCATION/TRAINING PROGRAM

## 2024-09-27 PROCEDURE — 94640 AIRWAY INHALATION TREATMENT: CPT

## 2024-09-27 PROCEDURE — 80053 COMPREHEN METABOLIC PANEL: CPT | Performed by: STUDENT IN AN ORGANIZED HEALTH CARE EDUCATION/TRAINING PROGRAM

## 2024-09-27 PROCEDURE — 25000003 PHARM REV CODE 250: Performed by: INTERNAL MEDICINE

## 2024-09-27 PROCEDURE — 36415 COLL VENOUS BLD VENIPUNCTURE: CPT | Performed by: STUDENT IN AN ORGANIZED HEALTH CARE EDUCATION/TRAINING PROGRAM

## 2024-09-27 PROCEDURE — 85025 COMPLETE CBC W/AUTO DIFF WBC: CPT | Performed by: STUDENT IN AN ORGANIZED HEALTH CARE EDUCATION/TRAINING PROGRAM

## 2024-09-27 PROCEDURE — 25000003 PHARM REV CODE 250: Performed by: STUDENT IN AN ORGANIZED HEALTH CARE EDUCATION/TRAINING PROGRAM

## 2024-09-27 PROCEDURE — 94760 N-INVAS EAR/PLS OXIMETRY 1: CPT

## 2024-09-27 RX ORDER — SUCRALFATE 1 G/1
1 TABLET ORAL
Status: DISCONTINUED | OUTPATIENT
Start: 2024-09-27 | End: 2024-09-28 | Stop reason: HOSPADM

## 2024-09-27 RX ORDER — INSULIN GLARGINE 100 [IU]/ML
5 INJECTION, SOLUTION SUBCUTANEOUS ONCE
Status: COMPLETED | OUTPATIENT
Start: 2024-09-27 | End: 2024-09-27

## 2024-09-27 RX ORDER — INSULIN GLARGINE 100 [IU]/ML
20 INJECTION, SOLUTION SUBCUTANEOUS 2 TIMES DAILY
Status: DISCONTINUED | OUTPATIENT
Start: 2024-09-27 | End: 2024-09-28

## 2024-09-27 RX ORDER — SUCRALFATE 1 G/1
1 TABLET ORAL 4 TIMES DAILY
COMMUNITY

## 2024-09-27 RX ADMIN — INSULIN ASPART 5 UNITS: 100 INJECTION, SOLUTION INTRAVENOUS; SUBCUTANEOUS at 04:09

## 2024-09-27 RX ADMIN — INSULIN GLARGINE 20 UNITS: 100 INJECTION, SOLUTION SUBCUTANEOUS at 10:09

## 2024-09-27 RX ADMIN — TAMSULOSIN HYDROCHLORIDE 0.4 MG: 0.4 CAPSULE ORAL at 09:09

## 2024-09-27 RX ADMIN — INSULIN GLARGINE 20 UNITS: 100 INJECTION, SOLUTION SUBCUTANEOUS at 09:09

## 2024-09-27 RX ADMIN — METHYLPREDNISOLONE SODIUM SUCCINATE 60 MG: 125 INJECTION, POWDER, FOR SOLUTION INTRAMUSCULAR; INTRAVENOUS at 05:09

## 2024-09-27 RX ADMIN — AMLODIPINE BESYLATE 10 MG: 5 TABLET ORAL at 09:09

## 2024-09-27 RX ADMIN — INSULIN ASPART 3 UNITS: 100 INJECTION, SOLUTION INTRAVENOUS; SUBCUTANEOUS at 12:09

## 2024-09-27 RX ADMIN — SUCRALFATE 1 G: 1 TABLET ORAL at 11:09

## 2024-09-27 RX ADMIN — HEPARIN SODIUM 5000 UNITS: 5000 INJECTION INTRAVENOUS; SUBCUTANEOUS at 01:09

## 2024-09-27 RX ADMIN — GUAIFENESIN 400 MG: 200 SOLUTION ORAL at 07:09

## 2024-09-27 RX ADMIN — ASPIRIN 81 MG CHEWABLE TABLET 81 MG: 81 TABLET CHEWABLE at 09:09

## 2024-09-27 RX ADMIN — ATORVASTATIN CALCIUM 40 MG: 40 TABLET, FILM COATED ORAL at 10:09

## 2024-09-27 RX ADMIN — HEPARIN SODIUM 5000 UNITS: 5000 INJECTION INTRAVENOUS; SUBCUTANEOUS at 05:09

## 2024-09-27 RX ADMIN — SUCRALFATE 1 G: 1 TABLET ORAL at 10:09

## 2024-09-27 RX ADMIN — IPRATROPIUM BROMIDE AND ALBUTEROL SULFATE 3 ML: 2.5; .5 SOLUTION RESPIRATORY (INHALATION) at 01:09

## 2024-09-27 RX ADMIN — INSULIN ASPART 5 UNITS: 100 INJECTION, SOLUTION INTRAVENOUS; SUBCUTANEOUS at 09:09

## 2024-09-27 RX ADMIN — MYCOPHENOLATE MOFETIL 500 MG: 250 CAPSULE ORAL at 09:09

## 2024-09-27 RX ADMIN — MYCOPHENOLATE MOFETIL 500 MG: 250 CAPSULE ORAL at 10:09

## 2024-09-27 RX ADMIN — OXYBUTYNIN CHLORIDE 5 MG: 5 TABLET, EXTENDED RELEASE ORAL at 09:09

## 2024-09-27 RX ADMIN — HEPARIN SODIUM 5000 UNITS: 5000 INJECTION INTRAVENOUS; SUBCUTANEOUS at 10:09

## 2024-09-27 RX ADMIN — INSULIN GLARGINE 5 UNITS: 100 INJECTION, SOLUTION SUBCUTANEOUS at 12:09

## 2024-09-27 RX ADMIN — METOPROLOL SUCCINATE 50 MG: 50 TABLET, EXTENDED RELEASE ORAL at 09:09

## 2024-09-27 RX ADMIN — CHOLECALCIFEROL TAB 25 MCG (1000 UNIT) 2000 UNITS: 25 TAB at 09:09

## 2024-09-27 RX ADMIN — INSULIN ASPART 2 UNITS: 100 INJECTION, SOLUTION INTRAVENOUS; SUBCUTANEOUS at 10:09

## 2024-09-27 RX ADMIN — SACUBITRIL AND VALSARTAN 1 TABLET: 24; 26 TABLET, FILM COATED ORAL at 10:09

## 2024-09-27 RX ADMIN — Medication 6 MG: at 10:09

## 2024-09-27 RX ADMIN — SUCRALFATE 1 G: 1 TABLET ORAL at 04:09

## 2024-09-27 RX ADMIN — SACUBITRIL AND VALSARTAN 1 TABLET: 24; 26 TABLET, FILM COATED ORAL at 09:09

## 2024-09-27 RX ADMIN — FUROSEMIDE 20 MG: 20 TABLET ORAL at 09:09

## 2024-09-27 RX ADMIN — IPRATROPIUM BROMIDE AND ALBUTEROL SULFATE 3 ML: 2.5; .5 SOLUTION RESPIRATORY (INHALATION) at 07:09

## 2024-09-27 NOTE — PROGRESS NOTES
[facility] Medicine  Progress Note    Patient Name: Emmanuel Grant  MRN: [id number]  Patient Class: IP- Inpatient   Admission Date: 9/25/2024  Length of Stay: 2 days  Attending Physician: Domo Benito MD  Primary Care Provider: Wilfredo De Souza MD        Subjective:     Principal Problem:Interstitial lung disease        HPI:  73-year-old male with PMH of recently diagnosed interstitial lung disease, chronic hypoxic respiratory failure on 2 L oxygen at home, CAD s/p PCI, history of stroke, chronic systolic heart failure, hypertension, diabetes mellitus, hyperlipidemia, GERD, BPH presented to ER with complaints of chest pain.      Patient is a very poor historian and seems to have poor understanding of his medical issues.  Patient reports that he had onset of substernal and epigastric chest pain 2 days ago.  He came to the ER and was discharged home day before yesterday.  His symptoms continued and he decided come to ER for further evaluation today.  On my evaluation patient reports that his chest pain has resolved as of now.  He does report ongoing shortness of breath with even mild exertion and that has been getting worse for last few weeks.  He has been using his nebulizer more frequently with some relief.  Of note he was recently diagnosed with I LD and saw a pulmonologist who started him on CellCept.  Patient states that he only took 1 dose of CellCept which made his head go crazy.  He has been having respiratory issues for last 6 months to 1 year and has been on oxygen for last 3-4 months.  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    On presentation to ER he was hypoxic requiring increased oxygen than his baseline, tachycardic, hypertensive, lab work significant for D-dimer 0.85, CT angio chest was done which was negative for PE but did show chronic interstitial lung disease.  Troponins were negative and EKG  was nonischemic.  He was given a dose of magnesium sulfate, Solu-Medrol and multiple breathing treatments in the ER.  ER requested admitting the patient for ongoing care given increased oxygen requirements.    PMH: Recently diagnosed ILD, chronic hypoxic respiratory failure on 2 L oxygen at home, CAD s/p PCI, history of stroke, chronic systolic heart failure, hypertension, diabetes mellitus, hyperlipidemia, GERD, BPH   PSH: PCI   SH:  Denies smoking but has been exposed to secondhand smoke during going ears.  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  Denies alcohol use, denies recreational drug use.    FH: Reviewed and not pertinent.    Overview/Hospital Course:  No notes on file    Interval History: Patient was seen and examined today. He looks and feels better today, no acute event's overnight, hyperglycemia is improving, discharge planning home with home health in the next 24 hours if continues to improve.     Review of Systems   Constitutional:  Positive for activity change (Decreased), appetite change (Decreased) and fatigue.   HENT:  Negative for congestion.    Eyes:  Negative for visual disturbance.   Respiratory:  Positive for chest tightness. Negative for cough and shortness of breath.    Cardiovascular:  Negative for chest pain and palpitations.   Gastrointestinal:  Negative for abdominal pain, nausea and vomiting.   Endocrine: Negative.    Genitourinary:  Negative for dysuria.   Musculoskeletal:  Negative for arthralgias and neck pain.   Skin:  Negative for color change.   Allergic/Immunologic: Negative.    Neurological:  Positive for headaches. Negative for speech difficulty.   Hematological: Negative.    Psychiatric/Behavioral:  Negative for agitation.      Objective:     Vital Signs (Most Recent):  Temp: 97.9 °F (36.6 °C) (09/27/24 1136)  Pulse: 70 (09/27/24 1136)  Resp: 18 (09/27/24 1136)  BP: (!) 141/76 (09/27/24 1136)  SpO2: 96 % (09/27/24 1136) Vital Signs (24h Range):  Temp:  [97.3 °F (36.3  °C)-98.4 °F (36.9 °C)] 97.9 °F (36.6 °C)  Pulse:  [65-91] 70  Resp:  [16-18] 18  SpO2:  [95 %-96 %] 96 %  BP: (119-141)/(71-85) 141/76     Weight: 59.9 kg (132 lb 0.9 oz)  Body mass index is 20.08 kg/m².    Intake/Output Summary (Last 24 hours) at 9/27/2024 1335  Last data filed at 9/27/2024 0820  Gross per 24 hour   Intake 360 ml   Output 300 ml   Net 60 ml         Physical Exam  Vitals and nursing note reviewed.   Constitutional:       Appearance: He is ill-appearing.   HENT:      Head: Normocephalic and atraumatic.      Nose: Nose normal.      Mouth/Throat:      Mouth: Mucous membranes are moist.   Eyes:      Pupils: Pupils are equal, round, and reactive to light.   Cardiovascular:      Rate and Rhythm: Regular rhythm. Tachycardia present.      Pulses: Normal pulses.      Heart sounds: Normal heart sounds.   Pulmonary:      Effort: Respiratory distress (Diffuse crackles over the entire lung field) present.   Abdominal:      General: Abdomen is flat.      Palpations: Abdomen is soft.      Tenderness: There is no abdominal tenderness.   Musculoskeletal:         General: No swelling. Normal range of motion.      Cervical back: Normal range of motion and neck supple.   Skin:     General: Skin is warm.      Capillary Refill: Capillary refill takes less than 2 seconds.   Neurological:      General: No focal deficit present.      Mental Status: He is alert and oriented to person, place, and time.   Psychiatric:         Mood and Affect: Mood normal.             Significant Labs: All pertinent labs within the past 24 hours have been reviewed.  BMP:   Recent Labs   Lab 09/27/24  0509   *      K 3.5      CO2 23   BUN 21   CREATININE 0.9   CALCIUM 9.5   MG 1.9     CBC:   Recent Labs   Lab 09/26/24  0456 09/27/24  0509   WBC 6.25 12.66   HGB 13.1* 12.9*   HCT 38.4* 38.7*    247       Significant Imaging: I have reviewed all pertinent imaging results/findings within the past 24  "hours.    Assessment/Plan:      * Interstitial lung disease exacerbation  -Recently diagnosed with interstitial lung disease.  On 2 L oxygen at baseline.  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  -Follows with pulmonology outpatient.  Has received multiple courses of steroid taper.  However his symptoms have been gradually worsening over the last few months.    -Was recently started on CellCept but states that he took only 1 dose and his "head went crazy."  -Given a dose of magnesium sulfate, Solu-Medrol, breathing treatments in the ER.    -Continue IV Solu-Medrol 60 mg q.6 hours.  Continue inhalers and nebulizers as needed.  -Consult pulmonology in a.m..      Chest pain  -Presents with 2 day history of chest pain.  Troponin negative, EKG nonischemic.    -D-dimer elevated, CT angio negative for PE but does redemonstrate interstitial lung disease.  -Chest pain likely pleuritic and musculoskeletal in the setting of increased work of breathing due to ILD flare-up.  -Management of ILD as above.  -As needed pain control.  -Monitor on telemetry.  Continuous pulse oximetry.        Chronic HFrEF (heart failure with reduced ejection fraction)  -Euvolemic.    -Continue Coreg, Lasix, Entresto.        Essential hypertension  -Hypertensive on presentation.    -Continue home amlodipine, Coreg, Entresto.        GERD (gastroesophageal reflux disease)  -Has history of hiatal hernia leading to worsening reflux symptoms.  Follows with GI.  Plan for possible surgical intervention for hiatal hernia.    -Start IV famotidine.  Reported allergic to pantoprazole.        BPH (benign prostatic hyperplasia)  -Continue Flomax.          Acute on chronic respiratory failure with hypoxia  -On 2 L oxygen at baseline for last 4-6 months due to underlying LD.    -Now requiring 3-4 L oxygen with worsening shortness of breath and chest pain.  Likely secondary to ILD flare-up.  -CT angio negative for " PE.  -Steroids as above.    -Continuous pulse oximetry.  Maintain O2 saturations > 92%.        Type 2 diabetes mellitus without complication, without long-term current use of insulin  -Check A1c.   -Continue home glimepiride.  Start sliding scale while inpatient.    -Hold home metformin.        VTE Risk Mitigation (From admission, onward)           Ordered     heparin (porcine) injection 5,000 Units  Every 8 hours         09/25/24 1831     IP VTE HIGH RISK PATIENT  Once         09/25/24 1831     Place sequential compression device  Until discontinued         09/25/24 1831                    Discharge Planning   DERIC: 9/29/2024     Code Status: Full Code   Is the patient medically ready for discharge?:     Reason for patient still in hospital (select all that apply): Patient trending condition  Discharge Plan A: Home                  Domo Benito MD  Department of Hospital Medicine   [facility]

## 2024-09-27 NOTE — NURSING
[facility], [facility]  Nurses Note -- 4 Eyes      9/27/2024       Skin assessed on: Q Shift      [x] No Pressure Injuries Present    []Prevention Measures Documented    [] Yes LDA  for Pressure Injury Previously documented     [] Yes New Pressure Injury Discovered   [] LDA for New Pressure Injury Added      Attending RN:  Cheo Villalba LPN     Second RN:  Radha CHAU

## 2024-09-27 NOTE — PLAN OF CARE
09/27/24 1108   Medicare Message   Important Message from Medicare regarding Discharge Appeal Rights Given to patient/caregiver;Explained to patient/caregiver;Signed/date by patient/caregiver   Date IMM was signed 09/27/24   Time IMM was signed 1049

## 2024-09-27 NOTE — SUBJECTIVE & OBJECTIVE
Interval History: Patient was seen and examined today. He looks and feels better today, no acute event's overnight, hyperglycemia is improving, discharge planning home with home health in the next 24 hours if continues to improve.     Review of Systems   Constitutional:  Positive for activity change (Decreased), appetite change (Decreased) and fatigue.   HENT:  Negative for congestion.    Eyes:  Negative for visual disturbance.   Respiratory:  Positive for chest tightness. Negative for cough and shortness of breath.    Cardiovascular:  Negative for chest pain and palpitations.   Gastrointestinal:  Negative for abdominal pain, nausea and vomiting.   Endocrine: Negative.    Genitourinary:  Negative for dysuria.   Musculoskeletal:  Negative for arthralgias and neck pain.   Skin:  Negative for color change.   Allergic/Immunologic: Negative.    Neurological:  Positive for headaches. Negative for speech difficulty.   Hematological: Negative.    Psychiatric/Behavioral:  Negative for agitation.      Objective:     Vital Signs (Most Recent):  Temp: 97.9 °F (36.6 °C) (09/27/24 1136)  Pulse: 70 (09/27/24 1136)  Resp: 18 (09/27/24 1136)  BP: (!) 141/76 (09/27/24 1136)  SpO2: 96 % (09/27/24 1136) Vital Signs (24h Range):  Temp:  [97.3 °F (36.3 °C)-98.4 °F (36.9 °C)] 97.9 °F (36.6 °C)  Pulse:  [65-91] 70  Resp:  [16-18] 18  SpO2:  [95 %-96 %] 96 %  BP: (119-141)/(71-85) 141/76     Weight: 59.9 kg (132 lb 0.9 oz)  Body mass index is 20.08 kg/m².    Intake/Output Summary (Last 24 hours) at 9/27/2024 1335  Last data filed at 9/27/2024 0820  Gross per 24 hour   Intake 360 ml   Output 300 ml   Net 60 ml         Physical Exam  Vitals and nursing note reviewed.   Constitutional:       Appearance: He is ill-appearing.   HENT:      Head: Normocephalic and atraumatic.      Nose: Nose normal.      Mouth/Throat:      Mouth: Mucous membranes are moist.   Eyes:      Pupils: Pupils are equal, round, and reactive to light.   Cardiovascular:       Rate and Rhythm: Regular rhythm. Tachycardia present.      Pulses: Normal pulses.      Heart sounds: Normal heart sounds.   Pulmonary:      Effort: Respiratory distress (Diffuse crackles over the entire lung field) present.   Abdominal:      General: Abdomen is flat.      Palpations: Abdomen is soft.      Tenderness: There is no abdominal tenderness.   Musculoskeletal:         General: No swelling. Normal range of motion.      Cervical back: Normal range of motion and neck supple.   Skin:     General: Skin is warm.      Capillary Refill: Capillary refill takes less than 2 seconds.   Neurological:      General: No focal deficit present.      Mental Status: He is alert and oriented to person, place, and time.   Psychiatric:         Mood and Affect: Mood normal.             Significant Labs: All pertinent labs within the past 24 hours have been reviewed.  BMP:   Recent Labs   Lab 09/27/24  0509   *      K 3.5      CO2 23   BUN 21   CREATININE 0.9   CALCIUM 9.5   MG 1.9     CBC:   Recent Labs   Lab 09/26/24  0456 09/27/24  0509   WBC 6.25 12.66   HGB 13.1* 12.9*   HCT 38.4* 38.7*    247       Significant Imaging: I have reviewed all pertinent imaging results/findings within the past 24 hours.

## 2024-09-28 VITALS
OXYGEN SATURATION: 95 % | RESPIRATION RATE: 18 BRPM | HEART RATE: 57 BPM | SYSTOLIC BLOOD PRESSURE: 114 MMHG | DIASTOLIC BLOOD PRESSURE: 70 MMHG | TEMPERATURE: 98 F | HEIGHT: 68 IN | BODY MASS INDEX: 20.01 KG/M2 | WEIGHT: 132.06 LBS

## 2024-09-28 PROBLEM — R07.9 CHEST PAIN: Status: RESOLVED | Noted: 2024-06-16 | Resolved: 2024-09-28

## 2024-09-28 PROBLEM — J96.21 ACUTE ON CHRONIC RESPIRATORY FAILURE WITH HYPOXIA: Status: RESOLVED | Noted: 2024-02-28 | Resolved: 2024-09-28

## 2024-09-28 LAB
ALBUMIN SERPL BCP-MCNC: 3.1 G/DL (ref 3.5–5.2)
ALP SERPL-CCNC: 51 U/L (ref 55–135)
ALT SERPL W/O P-5'-P-CCNC: 23 U/L (ref 10–44)
ANION GAP SERPL CALC-SCNC: 11 MMOL/L (ref 8–16)
AST SERPL-CCNC: 21 U/L (ref 10–40)
BASOPHILS # BLD AUTO: 0.01 K/UL (ref 0–0.2)
BASOPHILS NFR BLD: 0.1 % (ref 0–1.9)
BILIRUB SERPL-MCNC: 0.5 MG/DL (ref 0.1–1)
BUN SERPL-MCNC: 22 MG/DL (ref 8–23)
CALCIUM SERPL-MCNC: 9.1 MG/DL (ref 8.7–10.5)
CHLORIDE SERPL-SCNC: 101 MMOL/L (ref 95–110)
CO2 SERPL-SCNC: 27 MMOL/L (ref 23–29)
CREAT SERPL-MCNC: 0.8 MG/DL (ref 0.5–1.4)
DIFFERENTIAL METHOD BLD: ABNORMAL
EOSINOPHIL # BLD AUTO: 0 K/UL (ref 0–0.5)
EOSINOPHIL NFR BLD: 0.3 % (ref 0–8)
ERYTHROCYTE [DISTWIDTH] IN BLOOD BY AUTOMATED COUNT: 13.4 % (ref 11.5–14.5)
EST. GFR  (NO RACE VARIABLE): >60 ML/MIN/1.73 M^2
GLUCOSE SERPL-MCNC: 102 MG/DL (ref 70–110)
HCT VFR BLD AUTO: 38.8 % (ref 40–54)
HGB BLD-MCNC: 13.1 G/DL (ref 14–18)
IMM GRANULOCYTES # BLD AUTO: 0.04 K/UL (ref 0–0.04)
IMM GRANULOCYTES NFR BLD AUTO: 0.4 % (ref 0–0.5)
LYMPHOCYTES # BLD AUTO: 1.1 K/UL (ref 1–4.8)
LYMPHOCYTES NFR BLD: 9.9 % (ref 18–48)
MAGNESIUM SERPL-MCNC: 1.9 MG/DL (ref 1.6–2.6)
MCH RBC QN AUTO: 28 PG (ref 27–31)
MCHC RBC AUTO-ENTMCNC: 33.8 G/DL (ref 32–36)
MCV RBC AUTO: 83 FL (ref 82–98)
MONOCYTES # BLD AUTO: 1.1 K/UL (ref 0.3–1)
MONOCYTES NFR BLD: 9.7 % (ref 4–15)
NEUTROPHILS # BLD AUTO: 8.8 K/UL (ref 1.8–7.7)
NEUTROPHILS NFR BLD: 79.6 % (ref 38–73)
NRBC BLD-RTO: 0 /100 WBC
PLATELET # BLD AUTO: 246 K/UL (ref 150–450)
PMV BLD AUTO: 10.9 FL (ref 9.2–12.9)
POCT GLUCOSE: 187 MG/DL (ref 70–110)
POCT GLUCOSE: 98 MG/DL (ref 70–110)
POTASSIUM SERPL-SCNC: 3.2 MMOL/L (ref 3.5–5.1)
PROT SERPL-MCNC: 6.4 G/DL (ref 6–8.4)
RBC # BLD AUTO: 4.68 M/UL (ref 4.6–6.2)
SODIUM SERPL-SCNC: 139 MMOL/L (ref 136–145)
WBC # BLD AUTO: 11.08 K/UL (ref 3.9–12.7)

## 2024-09-28 PROCEDURE — 25000003 PHARM REV CODE 250: Performed by: STUDENT IN AN ORGANIZED HEALTH CARE EDUCATION/TRAINING PROGRAM

## 2024-09-28 PROCEDURE — 25000003 PHARM REV CODE 250: Performed by: INTERNAL MEDICINE

## 2024-09-28 PROCEDURE — 63600175 PHARM REV CODE 636 W HCPCS: Performed by: STUDENT IN AN ORGANIZED HEALTH CARE EDUCATION/TRAINING PROGRAM

## 2024-09-28 PROCEDURE — 63600175 PHARM REV CODE 636 W HCPCS: Performed by: INTERNAL MEDICINE

## 2024-09-28 PROCEDURE — 80053 COMPREHEN METABOLIC PANEL: CPT | Performed by: STUDENT IN AN ORGANIZED HEALTH CARE EDUCATION/TRAINING PROGRAM

## 2024-09-28 PROCEDURE — 36415 COLL VENOUS BLD VENIPUNCTURE: CPT | Performed by: STUDENT IN AN ORGANIZED HEALTH CARE EDUCATION/TRAINING PROGRAM

## 2024-09-28 PROCEDURE — 85025 COMPLETE CBC W/AUTO DIFF WBC: CPT | Performed by: STUDENT IN AN ORGANIZED HEALTH CARE EDUCATION/TRAINING PROGRAM

## 2024-09-28 PROCEDURE — 83735 ASSAY OF MAGNESIUM: CPT | Performed by: STUDENT IN AN ORGANIZED HEALTH CARE EDUCATION/TRAINING PROGRAM

## 2024-09-28 RX ORDER — PREDNISONE 20 MG/1
20 TABLET ORAL ONCE
Status: COMPLETED | OUTPATIENT
Start: 2024-09-28 | End: 2024-09-28

## 2024-09-28 RX ORDER — PREDNISONE 20 MG/1
20 TABLET ORAL DAILY
Qty: 10 TABLET | Refills: 0 | Status: SHIPPED | OUTPATIENT
Start: 2024-09-28 | End: 2024-10-08

## 2024-09-28 RX ORDER — METOPROLOL SUCCINATE 50 MG/1
50 TABLET, EXTENDED RELEASE ORAL DAILY
Qty: 30 TABLET | Refills: 0 | Status: SHIPPED | OUTPATIENT
Start: 2024-09-29 | End: 2024-10-29

## 2024-09-28 RX ORDER — MYCOPHENOLATE MOFETIL 250 MG/1
500 CAPSULE ORAL 2 TIMES DAILY
Qty: 120 CAPSULE | Refills: 0 | Status: SHIPPED | OUTPATIENT
Start: 2024-09-28 | End: 2024-10-28

## 2024-09-28 RX ADMIN — SACUBITRIL AND VALSARTAN 1 TABLET: 24; 26 TABLET, FILM COATED ORAL at 08:09

## 2024-09-28 RX ADMIN — INSULIN GLARGINE 20 UNITS: 100 INJECTION, SOLUTION SUBCUTANEOUS at 08:09

## 2024-09-28 RX ADMIN — OXYBUTYNIN CHLORIDE 5 MG: 5 TABLET, EXTENDED RELEASE ORAL at 08:09

## 2024-09-28 RX ADMIN — SUCRALFATE 1 G: 1 TABLET ORAL at 06:09

## 2024-09-28 RX ADMIN — SUCRALFATE 1 G: 1 TABLET ORAL at 11:09

## 2024-09-28 RX ADMIN — METOPROLOL SUCCINATE 50 MG: 50 TABLET, EXTENDED RELEASE ORAL at 08:09

## 2024-09-28 RX ADMIN — TAMSULOSIN HYDROCHLORIDE 0.4 MG: 0.4 CAPSULE ORAL at 08:09

## 2024-09-28 RX ADMIN — ACETAMINOPHEN 650 MG: 325 TABLET ORAL at 08:09

## 2024-09-28 RX ADMIN — PREDNISONE 20 MG: 20 TABLET ORAL at 11:09

## 2024-09-28 RX ADMIN — ASPIRIN 81 MG CHEWABLE TABLET 81 MG: 81 TABLET CHEWABLE at 08:09

## 2024-09-28 RX ADMIN — FUROSEMIDE 20 MG: 20 TABLET ORAL at 08:09

## 2024-09-28 RX ADMIN — CHOLECALCIFEROL TAB 25 MCG (1000 UNIT) 2000 UNITS: 25 TAB at 08:09

## 2024-09-28 RX ADMIN — GUAIFENESIN 400 MG: 200 SOLUTION ORAL at 08:09

## 2024-09-28 RX ADMIN — MYCOPHENOLATE MOFETIL 500 MG: 250 CAPSULE ORAL at 08:09

## 2024-09-28 RX ADMIN — AMLODIPINE BESYLATE 10 MG: 5 TABLET ORAL at 08:09

## 2024-09-28 RX ADMIN — HEPARIN SODIUM 5000 UNITS: 5000 INJECTION INTRAVENOUS; SUBCUTANEOUS at 06:09

## 2024-09-28 NOTE — PLAN OF CARE
Case Management Final Discharge Note      Discharge Disposition: home     New DME ordered / company name: none    Relevant SDOH / Transition of Care Barriers:  none    Primary Caretaker and contact information: Emmanuel Grant Jr (Son)  [phone] (Mobile)     Scheduled followup appointment: PCP and Cardiology    Referrals placed: none    Transportation: family    Patient and family educated on discharge services and updated on DC plan. Bedside RN notified, patient clear to discharge from Case Management Perspective.      09/28/24 1341   Final Note   Assessment Type Final Discharge Note   Anticipated Discharge Disposition Home   What phone number can be called within the next 1-3 days to see how you are doing after discharge? [phone]   Hospital Resources/Appts/Education Provided Appointments scheduled and added to AVS   Post-Acute Status   Coverage PHN   Discharge Delays None known at this time

## 2024-09-28 NOTE — NURSING
Patient is discharged on room air, no distress noted. Tele and IV x2 removed with tip intact. Instruction printed, to be reviewed by virtual nurse. Patient sitting up in chair, wheels locked, call light in reach. Waiting for transport home

## 2024-09-28 NOTE — PLAN OF CARE
Problem: Adult Inpatient Plan of Care  Goal: Optimal Comfort and Wellbeing  Outcome: Progressing  Goal: Readiness for Transition of Care  Outcome: Progressing     Problem: Diabetes Comorbidity  Goal: Blood Glucose Level Within Targeted Range  Outcome: Progressing  Intervention: Monitor and Manage Glycemia  Flowsheets (Taken 9/28/2024 8100)  Glycemic Management: blood glucose monitored

## 2024-09-28 NOTE — NURSING
[facility], [facility] [place]  Nurses Note -- 4 Eyes      9/27/2024       Skin assessed on: Q Shift      [x] No Pressure Injuries Present    []Prevention Measures Documented    [] Yes LDA  for Pressure Injury Previously documented     [] Yes New Pressure Injury Discovered   [] LDA for New Pressure Injury Added      Attending RN:  Ronit Rock, RN     Second RN:  Cheo Villalba LPN        PER handoff given by Franky VEGA     Pt resting in bed quietly. NAD noted. No c/o pain.Respirations even and unlabored     Fall and safety precautions maintained. Bed alarm activated and audible.. Bed locked in lowest position, with side rails up x2. Call bell and personal items within reach

## 2024-09-28 NOTE — HOSPITAL COURSE
73-year-old male with PMH of recently diagnosed interstitial lung disease, chronic hypoxic respiratory failure on 2 L oxygen at home, CAD s/p PCI, history of stroke, chronic systolic heart failure, hypertension, diabetes mellitus, hyperlipidemia, GERD, BPH presented to ER with complaints of chest pain.       Patient is a very poor historian and seems to have poor understanding of his medical issues.  Patient reports that he had onset of substernal and epigastric chest pain 2 days ago.  He came to the ER and was discharged home day before yesterday.  His symptoms continued and he decided come to ER for further evaluation today.  On my evaluation patient reports that his chest pain has resolved as of now.  He does report ongoing shortness of breath with even mild exertion and that has been getting worse for last few weeks.  He has been using his nebulizer more frequently with some relief.  Of note he was recently diagnosed with I LD and saw a pulmonologist who started him on CellCept.  Patient states that he only took 1 dose of CellCept which made his head go crazy.  He has been having respiratory issues for last 6 months to 1 year and has been on oxygen for last 3-4 months.  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     On presentation to ER he was hypoxic requiring increased oxygen than his baseline, tachycardic, hypertensive, lab work significant for D-dimer 0.85, CT angio chest was done which was negative for PE but did show chronic interstitial lung disease.  Troponins were negative and EKG was nonischemic.  He was given a dose of magnesium sulfate, Solu-Medrol and multiple breathing treatments in the ER.  ER requested admitting the patient for ongoing care given increased oxygen requirements.     PMH: Recently diagnosed ILD, chronic hypoxic respiratory failure on 2 L oxygen at home, CAD s/p PCI, history of stroke, chronic systolic  heart failure, hypertension, diabetes mellitus, hyperlipidemia, GERD, BPH   PSH: PCI   SH:  Denies smoking but has been exposed to secondhand smoke during going ears.  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  Denies alcohol use, denies recreational drug use.    FH: Reviewed and not pertinent.  Patient was admitted to the floor and evaluated by pulmonology. Steroids were quickly deescalated but still patient was hyperglycemic for couple of days. Treatment regimen was adjusted, coreg was substituted for Toprol to minimize impact on breathing and bronchospasm, case management was consulted for home health agency referral and PFT.New prescriptions were sent to pharmacy, including steroid taper.  He is medically stable and cleared for discharge home.

## 2024-09-28 NOTE — DISCHARGE SUMMARY
[facility] Medicine  Discharge Summary      Patient Name: Emmanuel Grant  MRN: [id number]  ALVARO: [id number]  Patient Class: IP- Inpatient  Admission Date: 9/25/2024  Hospital Length of Stay: 3 days  Discharge Date and Time:  09/28/2024 12:55 PM  Attending Physician: Domo Benito MD   Discharging Provider: Domo Benito MD  Primary Care Provider: Wilfredo De Souza MD    Primary Care Team: Networked reference to record PCT     HPI:   73-year-old male with PMH of recently diagnosed interstitial lung disease, chronic hypoxic respiratory failure on 2 L oxygen at home, CAD s/p PCI, history of stroke, chronic systolic heart failure, hypertension, diabetes mellitus, hyperlipidemia, GERD, BPH presented to ER with complaints of chest pain.      Patient is a very poor historian and seems to have poor understanding of his medical issues.  Patient reports that he had onset of substernal and epigastric chest pain 2 days ago.  He came to the ER and was discharged home day before yesterday.  His symptoms continued and he decided come to ER for further evaluation today.  On my evaluation patient reports that his chest pain has resolved as of now.  He does report ongoing shortness of breath with even mild exertion and that has been getting worse for last few weeks.  He has been using his nebulizer more frequently with some relief.  Of note he was recently diagnosed with I LD and saw a pulmonologist who started him on CellCept.  Patient states that he only took 1 dose of CellCept which made his head go crazy.  He has been having respiratory issues for last 6 months to 1 year and has been on oxygen for last 3-4 months.  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    On presentation to ER he was hypoxic requiring increased oxygen than his baseline, tachycardic, hypertensive, lab work significant for D-dimer 0.85, CT angio chest was  done which was negative for PE but did show chronic interstitial lung disease.  Troponins were negative and EKG was nonischemic.  He was given a dose of magnesium sulfate, Solu-Medrol and multiple breathing treatments in the ER.  ER requested admitting the patient for ongoing care given increased oxygen requirements.    PMH: Recently diagnosed ILD, chronic hypoxic respiratory failure on 2 L oxygen at home, CAD s/p PCI, history of stroke, chronic systolic heart failure, hypertension, diabetes mellitus, hyperlipidemia, GERD, BPH   PSH: PCI   SH:  Denies smoking but has been exposed to secondhand smoke during going ears.  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  Denies alcohol use, denies recreational drug use.    FH: Reviewed and not pertinent.    * No surgery found *      Hospital Course:   73-year-old male with PMH of recently diagnosed interstitial lung disease, chronic hypoxic respiratory failure on 2 L oxygen at home, CAD s/p PCI, history of stroke, chronic systolic heart failure, hypertension, diabetes mellitus, hyperlipidemia, GERD, BPH presented to ER with complaints of chest pain.       Patient is a very poor historian and seems to have poor understanding of his medical issues.  Patient reports that he had onset of substernal and epigastric chest pain 2 days ago.  He came to the ER and was discharged home day before yesterday.  His symptoms continued and he decided come to ER for further evaluation today.  On my evaluation patient reports that his chest pain has resolved as of now.  He does report ongoing shortness of breath with even mild exertion and that has been getting worse for last few weeks.  He has been using his nebulizer more frequently with some relief.  Of note he was recently diagnosed with I LD and saw a pulmonologist who started him on CellCept.  Patient states that he only took 1 dose of CellCept which made his head go crazy.  He has been having respiratory issues for last 6 months to 1  year and has been on oxygen for last 3-4 months.  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     On presentation to ER he was hypoxic requiring increased oxygen than his baseline, tachycardic, hypertensive, lab work significant for D-dimer 0.85, CT angio chest was done which was negative for PE but did show chronic interstitial lung disease.  Troponins were negative and EKG was nonischemic.  He was given a dose of magnesium sulfate, Solu-Medrol and multiple breathing treatments in the ER.  ER requested admitting the patient for ongoing care given increased oxygen requirements.     PMH: Recently diagnosed ILD, chronic hypoxic respiratory failure on 2 L oxygen at home, CAD s/p PCI, history of stroke, chronic systolic heart failure, hypertension, diabetes mellitus, hyperlipidemia, GERD, BPH   PSH: PCI   SH:  Denies smoking but has been exposed to secondhand smoke during going ears.  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  Denies alcohol use, denies recreational drug use.    FH: Reviewed and not pertinent.  Patient was admitted to the floor and evaluated by pulmonology. Steroids were quickly deescalated but still patient was hyperglycemic for couple of days. Treatment regimen was adjusted, coreg was substituted for Toprol to minimize impact on breathing and bronchospasm, case management was consulted for home health agency referral and PFT.New prescriptions were sent to pharmacy, including steroid taper.  He is medically stable and cleared for discharge home.      Goals of Care Treatment Preferences:  Code Status: Full Code         Consults:   Consults (From admission, onward)          Status Ordering Provider     Inpatient consult to Social Work/Case Management  Once        Provider:  (Not yet assigned)    Ordered OTTO HERRERA     Inpatient consult to Social Work  Once        Provider:  (Not yet assigned)    Acknowledged SAMEER  JEFF SARKAR     Inpatient consult to Pulmonology  Once        Provider:  Jeff Waldrop MD    Completed OTTO HERRERA            No new Assessment & Plan notes have been filed under this hospital service since the last note was generated.  Service: Hospital Medicine    Final Active Diagnoses:    Diagnosis Date Noted POA    PRINCIPAL PROBLEM:  Interstitial lung disease exacerbation [J84.9] 02/28/2024 Yes    Chronic HFrEF (heart failure with reduced ejection fraction) [I50.22] 05/01/2024 Yes    GERD (gastroesophageal reflux disease) [K21.9] 04/20/2024 Yes    BPH (benign prostatic hyperplasia) [N40.0] 02/28/2024 Yes    Type 2 diabetes mellitus without complication, without long-term current use of insulin [E11.9] 10/08/2021 Yes    Essential hypertension [I10] 05/29/2020 Yes      Problems Resolved During this Admission:    Diagnosis Date Noted Date Resolved POA    Chest pain [R07.9] 06/16/2024 09/28/2024 Yes    Acute on chronic respiratory failure with hypoxia [J96.21] 02/28/2024 09/28/2024 Yes       Discharged Condition: fair    Disposition: Home-Health Care [facility]    Follow Up:    Patient Instructions:   No discharge procedures on file.    Significant Diagnostic Studies: Labs: All labs within the past 24 hours have been reviewed    Pending Diagnostic Studies:       None           Medications:  Reconciled Home Medications:      Medication List        START taking these medications      metoprolol succinate 50 MG 24 hr tablet  Commonly known as: TOPROL-XL  Take 1 tablet (50 mg total) by mouth once daily.  Start taking on: September 29, 2024     mycophenolate 250 mg Cap  Commonly known as: CELLCEPT  Take 2 capsules (500 mg total) by mouth 2 (two) times daily.     predniSONE 20 MG tablet  Commonly known as: DELTASONE  Take 1 tablet (20 mg total) by mouth once daily. for 10 days            CONTINUE taking these medications      albuterol-ipratropium 2.5 mg-0.5 mg/3 mL nebulizer solution  Commonly known as:  DUO-NEB  Take 3 mLs by nebulization every 6 (six) hours as needed for Wheezing. Rescue     amLODIPine 10 MG tablet  Commonly known as: NORVASC  Take 1 tablet (10 mg total) by mouth once daily.     aspirin 81 MG Chew  Take 81 mg by mouth once daily.     atorvastatin 40 MG tablet  Commonly known as: LIPITOR  Take 1 tablet (40 mg total) by mouth every evening.     BEANO ORAL  Take 1 tablet by mouth 2 (two) times a day.     cholecalciferol (vitamin D3) 50 mcg (2,000 unit) Cap capsule  Commonly known as: VITAMIN D3  1 capsule.     ENTRESTO 24-26 mg per tablet  Generic drug: sacubitriL-valsartan  Take 1 tablet by mouth 2 (two) times daily.     furosemide 20 MG tablet  Commonly known as: LASIX  Take 1 tablet (20 mg total) by mouth once daily.     glimepiride 2 MG tablet  Commonly known as: AMARYL  Take 2 mg by mouth 2 (two) times a day.     metFORMIN 1000 MG tablet  Commonly known as: GLUCOPHAGE  Take 1 tablet (1,000 mg total) by mouth 2 (two) times daily with meals.     mirabegron 25 mg Tb24 ER tablet  Commonly known as: MYRBETRIQ  Take 1 tablet (25 mg total) by mouth once daily.     sucralfate 1 gram tablet  Commonly known as: CARAFATE  Take 1 g by mouth 4 (four) times daily.     tamsulosin 0.4 mg Cap  Commonly known as: FLOMAX  Take 1 capsule (0.4 mg total) by mouth once daily.            STOP taking these medications      carvediloL 25 MG tablet  Commonly known as: COREG              Indwelling Lines/Drains at time of discharge:   Lines/Drains/Airways       None                   Time spent on the discharge of patient: 35 minutes         Domo Benito MD  Department of Hospital Medicine  [facility] - Observ[facility]

## 2024-09-28 NOTE — PLAN OF CARE
Problem: Adult Inpatient Plan of Care  Goal: Plan of Care Review  Outcome: Progressing  Flowsheets (Taken 9/28/2024 1328)  Plan of Care Reviewed With: patient     Problem: Diabetes Comorbidity  Goal: Blood Glucose Level Within Targeted Range  Outcome: Progressing  Intervention: Monitor and Manage Glycemia  Flowsheets (Taken 9/28/2024 1328)  Glycemic Management:   blood glucose monitored   supplemental insulin given     Problem: Skin Injury Risk Increased  Goal: Skin Health and Integrity  Outcome: Progressing  Intervention: Optimize Skin Protection  Flowsheets (Taken 9/28/2024 1328)  Head of Bed (HOB) Positioning: HOB lowered

## 2024-09-29 ENCOUNTER — NURSE TRIAGE (OUTPATIENT)
Dept: ADMINISTRATIVE | Facility: CLINIC | Age: 74
End: 2024-09-29
Payer: MEDICARE

## 2024-09-29 NOTE — TELEPHONE ENCOUNTER
Patient c/o sudden difficulty swallowing his food tonight. States that he was forced to stop eating and only drink water. Hx of GERD and uses sucralfate before meals. Advised per protocol to be seen within 24 hours. Advised the patient to call back with any further questions or if symptoms worsen.    Reason for Disposition   Weak immune system (e.g., HIV positive, cancer chemo, splenectomy, organ transplant, chronic steroids)    Additional Information   Negative: [1] Severe difficulty swallowing (e.g., drooling or spitting) AND [2] started suddenly after taking a medicine or allergic food   Negative: Wheezing, stridor, hoarseness, or difficulty breathing   Negative: [1] Swollen tongue AND [2] sudden onset   Negative: Sounds like a life-threatening emergency to the triager   Negative: SEVERE difficulty swallowing (e.g., drooling or spitting, can't swallow water)   Negative: [1] Symptoms of blocked esophagus (e.g., can't swallow normal secretions, drooling) AND [2] present now   Negative: Symptoms of food or bone stuck in throat or esophagus (e.g., pain in throat or chest, FB sensation, blood-tinged saliva)   Negative: SEVERE symptoms of pill stuck in throat or esophagus (e.g., severe pain, bleeding, or inability to swallow liquids)   Negative: [1] Drinking very little AND [2] dehydration suspected (e.g., no urine > 12 hours, very dry mouth, very lightheaded)   Negative: [1] Refuses to drink anything AND [2] for > 12 hours   Negative: Patient sounds very sick or weak to the triager   Negative: Fever > 100.4 F (38.0 C)   Negative: [1] Coughing spells AND [2] occur during eating/feedings or within 2 hours   Negative: [1] Symptoms of pill stuck in throat or esophagus (e.g., pain in throat or chest, FB sensation) AND [2] no relief after using Care Advice    Protocols used: Swallowing Difficulty-A-AH

## 2024-09-30 ENCOUNTER — TELEPHONE (OUTPATIENT)
Dept: SURGERY | Facility: CLINIC | Age: 74
End: 2024-09-30
Payer: MEDICARE

## 2024-09-30 NOTE — CARE UPDATE
"[facility]      HOME HEALTH ORDERS  FACE TO FACE ENCOUNTER    Patient Name: Emmanuel Grant  YOB: 1950    PCP: Wilfredo De Souza MD   PCP Address: [address]  PCP Phone Number: [phone]  PCP Fax: [phone]    Encounter Date: 9/25/24    Admit to Home Health    Diagnoses:  Active Hospital Problems    Diagnosis  POA    *Interstitial lung disease exacerbation [J84.9]  Yes    Chronic HFrEF (heart failure with reduced ejection fraction) [I50.22]  Yes    GERD (gastroesophageal reflux disease) [K21.9]  Yes    BPH (benign prostatic hyperplasia) [N40.0]  Yes    Type 2 diabetes mellitus without complication, without long-term current use of insulin [E11.9]  Yes    Essential hypertension [I10]  Yes      Resolved Hospital Problems    Diagnosis Date Resolved POA    Chest pain [R07.9] 09/28/2024 Yes    Acute on chronic respiratory failure with hypoxia [J96.21] 09/28/2024 Yes       Follow Up Appointments:  Future Appointments   Date Time Provider Department Center   10/1/2024  1:00 PM Brandon Ventura MD [facility] CARDIO [facility]   10/2/2024 10:05 AM LAB, [facility] LAB [facility]   10/10/2024  9:30 AM Corwin Yañez NP [facility]   11/12/2024 10:20 AM Narda Knott MD [facility] URO [facility]   11/13/2024  8:15 AM LAB, [facility] DRAW STATION [facility] LAB [facility]   11/15/2024 10:00 AM Malika Calderon NP [facility] ENDOCRN [facility]   11/15/2024 10:30 AM Syeda Cabrales RN [facility] DIAMGT [facility]   11/20/2024  9:00 AM Wilfredo De Souza MD [facility]       Allergies:  Review of patient's allergies indicates:   Allergen Reactions    Dapagliflozin      Other reaction(s): Other (See Comments)    Pcn [penicillins]     Linagliptin Other (See Comments)     "it knocked me down", "it almost killed me"    Lisinopril Other (See Comments)     cough    Pantoprazole Hives       Medications: Review discharge medications with patient and family and " provide education.      I have seen and examined this patient within the last 30 days. My clinical findings that support the need for the home health skilled services and home bound status are the following:no   Weakness/numbness causing balance and gait disturbance due to COPD Exacerbation and Weakness/Debility making it taxing to leave home.     Diet:   cardiac diet and diabetic diet 1800 calorie    Labs:  Report Lab results to PCP.    Referrals/ Consults  Physical Therapy to evaluate and treat. Evaluate for home safety and equipment needs; Establish/upgrade home exercise program. Perform / instruct on therapeutic exercises, gait training, transfer training, and Range of Motion.  Occupational Therapy to evaluate and treat. Evaluate home environment for safety and equipment needs. Perform/Instruct on transfers, ADL training, ROM, and therapeutic exercises.  Aide to provide assistance with personal care, ADLs, and vital signs.    Activities:   activity as tolerated and ambulate in house     Nursing:   Agency to admit patient within 24 hours of hospital discharge unless specified on physician order or at patient request    SN to complete comprehensive assessment including routine vital signs. Instruct on disease process and s/s of complications to report to MD. Review/verify medication list sent home with the patient at time of discharge  and instruct patient/caregiver as needed. Frequency may be adjusted depending on start of care date.     Skilled nurse to perform up to 3 visits PRN for symptoms related to diagnosis    Notify MD if SBP > 160 or < 90; DBP > 90 or < 50; HR > 120 or < 50; Temp > 101; O2 < 88%; Other:       Ok to schedule additional visits based on staff availability and patient request on consecutive days within the home health episode.    Miscellaneous   Home Oxygen:  No change    Home Health Aide:  Nursing Twice weekly, Physical Therapy Three times weekly, Occupational Therapy Three times weekly,  and Home Health Aide Twice weekly    Wound Care Orders  no    I certify that this patient is confined to his home and needs intermittent skilled nursing care, physical therapy, and occupational therapy.

## 2024-09-30 NOTE — TELEPHONE ENCOUNTER
----- Message from Sintia Juarez MD sent at 9/28/2024  8:59 AM CDT -----  Sounds good, thanks.  SH  ----- Message -----  From: Naheed Schmitt MD  Sent: 9/25/2024   8:45 AM CDT  To: Sintia Juarez MD    No, that isn't necessary. He had a hard time completing them in the past so I don't think it will be helpful. I expect him to tolerate anesthesia fine. He is definitely higher than average risk but this surgery is necessary to prevent further lung damage. I suspect that his ILD is being made significantly worse by his ongoing reflux.     Naheed Schmitt M.D.  Pulmonary/Critical Care  ----- Message -----  From: Sintia Juarez MD  Sent: 9/25/2024   7:35 AM CDT  To: Naheed Schmitt MD; Linda Garcia RN    Ok, thanks Linda. Looks like he's seeing cardiology next week as well.    Dr Schmitt, thanks for the evaluation. Do you think PFTs may better help stratify his general anesthesia risk and should we hold off until we have a set? Thanks!    SH  ----- Message -----  From: Linda Garcia, KANDI  Sent: 9/23/2024   2:40 PM CDT  To: Sintia Juarez MD    Pulmonology office visit  Per Dr. Schmitt: Following with GI and on aggressive treatment with plans for possible surgery in the future. He is above average risk for surgery due to his ILD and hypoxia at baseline.

## 2024-09-30 NOTE — PLAN OF CARE
Jess at [facility] notified  Mgr that patient discharged without home health as recommended by the physician. Jess stated that she didn't see a plan of care or a note from  stating that patient declined home health.      Mgr messaged Dr. Benito to inquire if patient declined home health and inform that there was no home health plan of care. Dr. Benito stated that patient did want home health and put in plan of care.      Mgr called patient to inquire if he wanted home health and educated him on the services. Patient agreed and said he never had home health.     I provided the patient a choice of post acute providers and offered a list of CMS rated home health.    Patient has declined to select a preferred provider and elects placement with the first accepting in network provider that is available to provide services as ordered by the physician.     Patient accepted by [facility] and will be seen 10/1/24.     Mgr notified patient aht [facility] will see him tomorrow. Patient said that he still does not understand what it is and thinks he does not need it because he is in good health. Patient stated that he takes his medication daily and checks his blood presssure and blood sugar every morning.      Mgr notified Stephany at [facility] that patient changed his mind about home health.

## 2024-10-01 ENCOUNTER — OFFICE VISIT (OUTPATIENT)
Dept: CARDIOLOGY | Facility: CLINIC | Age: 74
End: 2024-10-01
Payer: MEDICARE

## 2024-10-01 VITALS
HEART RATE: 92 BPM | HEIGHT: 68 IN | OXYGEN SATURATION: 93 % | SYSTOLIC BLOOD PRESSURE: 100 MMHG | DIASTOLIC BLOOD PRESSURE: 60 MMHG | BODY MASS INDEX: 20.68 KG/M2 | WEIGHT: 136.44 LBS | RESPIRATION RATE: 18 BRPM

## 2024-10-01 DIAGNOSIS — I25.10 CORONARY ARTERY DISEASE INVOLVING NATIVE CORONARY ARTERY OF NATIVE HEART WITHOUT ANGINA PECTORIS: Primary | ICD-10-CM

## 2024-10-01 DIAGNOSIS — I50.22 HEART FAILURE WITH MID-RANGE EJECTION FRACTION (HFMEF): ICD-10-CM

## 2024-10-01 DIAGNOSIS — I10 ESSENTIAL HYPERTENSION: ICD-10-CM

## 2024-10-01 DIAGNOSIS — I77.810 AORTIC ROOT DILATATION: ICD-10-CM

## 2024-10-01 DIAGNOSIS — Z01.810 PREOP CARDIOVASCULAR EXAM: ICD-10-CM

## 2024-10-01 DIAGNOSIS — K21.9 GASTROESOPHAGEAL REFLUX DISEASE, UNSPECIFIED WHETHER ESOPHAGITIS PRESENT: ICD-10-CM

## 2024-10-01 DIAGNOSIS — J84.9 INTERSTITIAL LUNG DISEASE: ICD-10-CM

## 2024-10-01 DIAGNOSIS — E78.2 MIXED HYPERLIPIDEMIA: ICD-10-CM

## 2024-10-01 PROCEDURE — 99214 OFFICE O/P EST MOD 30 MIN: CPT | Mod: S$GLB,,, | Performed by: INTERNAL MEDICINE

## 2024-10-01 PROCEDURE — 3052F HG A1C>EQUAL 8.0%<EQUAL 9.0%: CPT | Mod: CPTII,S$GLB,, | Performed by: INTERNAL MEDICINE

## 2024-10-01 PROCEDURE — 4010F ACE/ARB THERAPY RXD/TAKEN: CPT | Mod: CPTII,S$GLB,, | Performed by: INTERNAL MEDICINE

## 2024-10-01 PROCEDURE — G2211 COMPLEX E/M VISIT ADD ON: HCPCS | Mod: S$GLB,,, | Performed by: INTERNAL MEDICINE

## 2024-10-01 PROCEDURE — 1101F PT FALLS ASSESS-DOCD LE1/YR: CPT | Mod: CPTII,S$GLB,, | Performed by: INTERNAL MEDICINE

## 2024-10-01 PROCEDURE — 3288F FALL RISK ASSESSMENT DOCD: CPT | Mod: CPTII,S$GLB,, | Performed by: INTERNAL MEDICINE

## 2024-10-01 PROCEDURE — 1111F DSCHRG MED/CURRENT MED MERGE: CPT | Mod: CPTII,S$GLB,, | Performed by: INTERNAL MEDICINE

## 2024-10-01 PROCEDURE — 3062F POS MACROALBUMINURIA REV: CPT | Mod: CPTII,S$GLB,, | Performed by: INTERNAL MEDICINE

## 2024-10-01 PROCEDURE — 1126F AMNT PAIN NOTED NONE PRSNT: CPT | Mod: CPTII,S$GLB,, | Performed by: INTERNAL MEDICINE

## 2024-10-01 PROCEDURE — 3066F NEPHROPATHY DOC TX: CPT | Mod: CPTII,S$GLB,, | Performed by: INTERNAL MEDICINE

## 2024-10-01 PROCEDURE — 1159F MED LIST DOCD IN RCRD: CPT | Mod: CPTII,S$GLB,, | Performed by: INTERNAL MEDICINE

## 2024-10-01 PROCEDURE — 1160F RVW MEDS BY RX/DR IN RCRD: CPT | Mod: CPTII,S$GLB,, | Performed by: INTERNAL MEDICINE

## 2024-10-01 PROCEDURE — 3074F SYST BP LT 130 MM HG: CPT | Mod: CPTII,S$GLB,, | Performed by: INTERNAL MEDICINE

## 2024-10-01 PROCEDURE — 3008F BODY MASS INDEX DOCD: CPT | Mod: CPTII,S$GLB,, | Performed by: INTERNAL MEDICINE

## 2024-10-01 PROCEDURE — 99999 PR PBB SHADOW E&M-EST. PATIENT-LVL V: CPT | Mod: PBBFAC,,, | Performed by: INTERNAL MEDICINE

## 2024-10-01 PROCEDURE — 3078F DIAST BP <80 MM HG: CPT | Mod: CPTII,S$GLB,, | Performed by: INTERNAL MEDICINE

## 2024-10-01 NOTE — PROGRESS NOTES
CARDIOVASCULAR PROGRESS NOTE    REASON FOR CONSULT:   Emmanuel Grant is a 73 y.o. male who presents for follow up of CAD/ICM/HFmEF.    PCP: Mirella Schmitt  HISTORY OF PRESENT ILLNESS:   The patient returns for follow up.  In the interim since his last visit, he was hospitalized and underwent heart catheterization noting patent LAD stent with chronically occluded 1st diagonal jailed by the stent.  Circumflex and RCA with otherwise without significant stenoses.  He is describing chest discomfort which is reminiscent of his reflux.  Possible fundoplication is being considered.  He otherwise denies shortness breath, palpitations, or syncope.  There has been no PND, orthopnea, melena, hematuria, or claudication symptoms.    CARDIOVASCULAR HISTORY:   CAD/PCI, mild isch CMP (echo 6/2024, MPI with LAD scar 6/2024)   Cath 9/10/24: patent LAD stent, D1 jailed/, med rx planned.    Ao root dil, 3.8->4.0cm (echo 7/2024)    PAST MEDICAL HISTORY:     Past Medical History:   Diagnosis Date    CAD (coronary artery disease)     Sees [facility], h/o stent    Diabetes mellitus     Hypertension     Kidney stone     Stroke     age 62       PAST SURGICAL HISTORY:     Past Surgical History:   Procedure Laterality Date    CARDIAC CATHETERIZATION      with stent    COLONOSCOPY N/A 03/27/2024    Procedure: COLONOSCOPY;  Surgeon: Ariela Castro MD;  Location: [facility] ENDO;  Service: Endoscopy;  Laterality: N/A;    ESOPHAGOGASTRODUODENOSCOPY N/A 03/27/2024    Procedure: EGD (ESOPHAGOGASTRODUODENOSCOPY);  Surgeon: Ariela Castro MD;  Location: [facility] ENDO;  Service: Endoscopy;  Laterality: N/A;    LEFT HEART CATHETERIZATION Left 9/10/2024    Procedure: Left heart cath;  Surgeon: Jemal Drummond MD;  Location: [facility] CATH LAB;  Service: Cardiology;  Laterality: Left;    SHOULDER SURGERY Right        ALLERGIES AND MEDICATION:     Review of patient's allergies indicates:   Allergen Reactions    Dapagliflozin      Other reaction(s):  "Other (See Comments)    Pcn [penicillins]     Linagliptin Other (See Comments)     "it knocked me down", "it almost killed me"    Lisinopril Other (See Comments)     cough    Pantoprazole Hives        Medication List            Accurate as of October 1, 2024 12:56 PM. If you have any questions, ask your nurse or doctor.                CONTINUE taking these medications      albuterol-ipratropium 2.5 mg-0.5 mg/3 mL nebulizer solution  Commonly known as: DUO-NEB  Take 3 mLs by nebulization every 6 (six) hours as needed for Wheezing. Rescue     amLODIPine 10 MG tablet  Commonly known as: NORVASC  Take 1 tablet (10 mg total) by mouth once daily.     aspirin 81 MG Chew     atorvastatin 40 MG tablet  Commonly known as: LIPITOR  Take 1 tablet (40 mg total) by mouth every evening.     BEANO ORAL     cholecalciferol (vitamin D3) 50 mcg (2,000 unit) Cap capsule  Commonly known as: VITAMIN D3     ENTRESTO 24-26 mg per tablet  Generic drug: sacubitriL-valsartan  Take 1 tablet by mouth 2 (two) times daily.     furosemide 20 MG tablet  Commonly known as: LASIX  Take 1 tablet (20 mg total) by mouth once daily.     glimepiride 2 MG tablet  Commonly known as: AMARYL     metFORMIN 1000 MG tablet  Commonly known as: GLUCOPHAGE  Take 1 tablet (1,000 mg total) by mouth 2 (two) times daily with meals.     metoprolol succinate 50 MG 24 hr tablet  Commonly known as: TOPROL-XL  Take 1 tablet (50 mg total) by mouth once daily.     mirabegron 25 mg Tb24 ER tablet  Commonly known as: MYRBETRIQ  Take 1 tablet (25 mg total) by mouth once daily.     mycophenolate 250 mg Cap  Commonly known as: CELLCEPT  Take 2 capsules (500 mg total) by mouth 2 (two) times daily.     predniSONE 20 MG tablet  Commonly known as: DELTASONE  Take 1 tablet (20 mg total) by mouth once daily. for 10 days     sucralfate 1 gram tablet  Commonly known as: CARAFATE     tamsulosin 0.4 mg Cap  Commonly known as: FLOMAX  Take 1 capsule (0.4 mg total) by mouth once daily.        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   Tobacco Use    Smoking status: Never     Passive exposure: Never    Smokeless tobacco: Never   Substance and Sexual Activity    Alcohol use: No    Drug use: Never    Sexual activity: Not Currently     Social Drivers of Health     Financial Resource Strain: High Risk (9/25/2024)    Overall Financial Resource Strain (CARDIA)     Difficulty of Paying Living Expenses: Very hard   Food Insecurity: Food Insecurity Present (9/25/2024)    Hunger Vital Sign     Worried About Running Out of Food in the Last Year: Often true     Ran Out of Food in the Last Year: Often true   Transportation Needs: No Transportation Needs (9/25/2024)    PRAPARE - Transportation     Lack of Transportation (Medical): No     Lack of Transportation (Non-Medical): No   Physical Activity: Inactive (9/25/2024)    Exercise Vital Sign     Days of Exercise per Week: 0 days     Minutes of Exercise per Session: 0 min   Stress: No Stress Concern Present (9/25/2024)    [employer] [place] [employer] - Occupational Stress Questionnaire     Feeling of Stress : Not at all   Housing Stability: Low Risk  (9/25/2024)    Housing Stability Vital Sign     Unable to Pay for Housing in the Last Year: No     Homeless in the Last Year: No       FAMILY HISTORY:     Family History   Problem Relation Name Age of Onset    Cancer Mother      Colon cancer Sister      Esophageal cancer Neg Hx         REVIEW OF SYSTEMS:   Review of Systems   Constitutional:  Negative for chills, diaphoresis and fever.   HENT:  Negative for nosebleeds.    Eyes:  Negative for blurred vision, double vision and photophobia.   Respiratory:  Negative for hemoptysis, shortness of breath and wheezing.    Cardiovascular:  Positive for chest pain (Heartburn). Negative for palpitations, orthopnea, claudication, leg  "swelling and PND.   Gastrointestinal:  Negative for abdominal pain, blood in stool, heartburn, melena, nausea and vomiting.   Genitourinary:  Negative for flank pain and hematuria.   Musculoskeletal:  Negative for falls, myalgias and neck pain.   Skin:  Negative for rash.   Neurological:  Negative for dizziness, seizures, loss of consciousness, weakness and headaches.   Endo/Heme/Allergies:  Negative for polydipsia. Does not bruise/bleed easily.   Psychiatric/Behavioral:  Negative for depression and memory loss. The patient is not nervous/anxious.        PHYSICAL EXAM:     Vitals:    10/01/24 1250   BP: 100/60   Pulse: 92   Resp: 18    Body mass index is 20.75 kg/m².  Weight: 61.9 kg (136 lb 7.4 oz)   Height: 5' 8" (172.7 cm)     Physical Exam  Vitals reviewed.   Constitutional:       General: He is not in acute distress.     Appearance: Normal appearance. He is well-developed and normal weight. He is not ill-appearing, toxic-appearing or diaphoretic.   HENT:      Head: Normocephalic and atraumatic.   Eyes:      General: No scleral icterus.     Extraocular Movements: Extraocular movements intact.      Conjunctiva/sclera: Conjunctivae normal.      Pupils: Pupils are equal, round, and reactive to light.   Neck:      Thyroid: No thyromegaly.      Vascular: Normal carotid pulses. No carotid bruit or JVD.      Trachea: Trachea normal.   Cardiovascular:      Rate and Rhythm: Normal rate and regular rhythm.      Pulses:           Carotid pulses are 2+ on the right side and 2+ on the left side.     Heart sounds: S1 normal and S2 normal. No murmur heard.     No friction rub. No gallop.   Pulmonary:      Effort: Pulmonary effort is normal. No respiratory distress.      Breath sounds: No stridor. No wheezing, rhonchi or rales.   Chest:      Chest wall: No tenderness.   Abdominal:      General: There is no distension.      Palpations: Abdomen is soft.   Musculoskeletal:         General: No swelling or tenderness. Normal range " of motion.      Cervical back: Normal range of motion and neck supple. No edema or rigidity.      Right lower leg: No edema.      Left lower leg: No edema.   Feet:      Right foot:      Skin integrity: No ulcer.      Left foot:      Skin integrity: No ulcer.   Skin:     General: Skin is warm and dry.      Coloration: Skin is not jaundiced.   Neurological:      General: No focal deficit present.      Mental Status: He is alert and oriented to person, place, and time.      Cranial Nerves: No cranial nerve deficit.   Psychiatric:         Mood and Affect: Mood normal.         Speech: Speech normal.         Behavior: Behavior normal. Behavior is cooperative.         DATA:   EKG: (personally reviewed tracing(s))  6/16/24 SR 94, PAC, NSSTTW changes, ?LVH    Laboratory:  CBC:  Recent Labs   Lab 09/26/24  0456 09/27/24  0509 09/28/24  0632   WBC 6.25 12.66 11.08   Hemoglobin 13.1 L 12.9 L 13.1 L   Hematocrit 38.4 L 38.7 L 38.8 L   Platelets 229 247 246       CHEMISTRIES:  Recent Labs   Lab 05/01/24  2140 05/07/24  0752 06/29/24  1009 07/06/24  1048 09/24/24  1809 09/25/24  1159 09/26/24  0456 09/27/24  0509 09/28/24  0632   Glucose 306 H   < > 214 H   < > 162 H 100 364 H 244 H 102   Sodium 139   < > 137   < > 138 138 136 138 139   Potassium 4.3   < > 3.9   < > 4.5 4.2 4.0 3.5 3.2 L   BUN 13   < > 21   < > 11 9 18 21 22   Creatinine 1.2   < > 1.2   < > 1.1 1.0 1.2 0.9 0.8   eGFR >60   < > >60   < > >60 >60 >60 >60 >60   Calcium 10.0   < > 9.9   < > 9.8 10.2 9.7 9.5 9.1   Magnesium 1.3 L  --  1.4 L  --   --   --  1.9 1.9 1.9    < > = values in this interval not displayed.       CARDIAC BIOMARKERS:  Recent Labs   Lab 07/20/23  1213 02/28/24  1625 09/24/24  1809 09/24/24 2041 09/25/24  1159     --   --   --   --    Troponin I  --    < > <0.006 <0.006 <0.006    < > = values in this interval not displayed.       COAGS:  Recent Labs   Lab 07/07/23  1212   INR 1.2       LIPIDS/LFTS:  Recent Labs   Lab 05/07/24  0752  06/16/24  1212 09/26/24  0456 09/27/24  0509 09/28/24  0632   Cholesterol 109 L  --   --   --   --    Triglycerides 60  --   --   --   --    HDL 39 L  --   --   --   --    LDL Cholesterol 58.0 L  --   --   --   --    Non-HDL Cholesterol 70  --   --   --   --    AST 18   < > 13 14 21   ALT 34   < > 12 14 23    < > = values in this interval not displayed.         Cardiovascular Testing:  Cath 9/10/24    There was non-obstructive coronary artery disease. Patent LAD stent, Small D1 -  (jailed), Cx/RCA with luminal irregularities    The post-procedure left ventricular end diastolic pressure was 17.    Echo 7/2/24 (Ao root 4.0cm)    Left Ventricle: The left ventricle is normal in size. Normal wall thickness. Regional wall motion abnormalities and Global hypokinesis present. See diagram for wall motion findings. There is mildly reduced systolic function with a visually estimated ejection fraction of 40 - 45%. Biplane (2D) method of discs ejection fraction is 43%. Global longitudinal strain is -13.0%.    Right Ventricle: Normal right ventricular cavity size. Wall thickness is normal. Systolic function is normal.    Aortic Valve: The aortic valve is a trileaflet valve. There is mild aortic valve sclerosis.    Mitral Valve: There is mild bileaflet sclerosis. There is mild mitral annular calcification present.    Pulmonic Valve: There is mild regurgitation.    Pulmonary Artery: The estimated pulmonary artery systolic pressure is 24 mmHg.    IVC/SVC: Normal venous pressure at 3 mmHg.    L MPI 6/17/24     Abnormal myocardial perfusion scan.    There is a severe intensity, medium sized, mostly fixed perfusion abnormality with minimal reversibilty in the mid to apical anterior and apical wall(s) in the typical distribution of the LAD territory.  This is conssistent with scar and minimal periinfarct ischemia.    There are no other significant perfusion abnormalities.    The gated perfusion images showed an ejection fraction of  40% post stress.    There is anteroapical wall akinesis during stress.    ASSESSMENT:   # CAD/MI/PCI, MPI 6/2024 with predom fixed LAD defect and mild LV dysfxn.  Cath 9/2024 with patent LAD stent-med rx planned.  Complaining of GI sxs similar to his GERD.  # ?HFmEF, appears euvolemic  # ILD, follows with Dr. Schmitt  # DM  # HTN, controlled  # HLP on atorva 40mg  # ao root dil, 3.8->4.0cm (echo 7/2024)  # preop for possible fundoplication     PLAN:   Cont med rx  Cont ASA 81mg qd  The patient is at low cardiac risk for possible intra-abdominal surgery and associated anesthesia.  No further preoperative cardiac testing is required.  If necessary, it is acceptable hold the patient's aspirin for 7 days prior to surgery and resume it as soon as possible thereafter.  RTC with surveillance echo 6 months (Apr 2025)     The above documents medical care services that are part of ongoing care related to this patient's serious/complex condition (Code ) (CAD/CHF/HTN/HLP).        Brandon Ventura MD, FACC

## 2024-10-02 ENCOUNTER — NURSE TRIAGE (OUTPATIENT)
Dept: ADMINISTRATIVE | Facility: CLINIC | Age: 74
End: 2024-10-02
Payer: MEDICARE

## 2024-10-02 NOTE — TELEPHONE ENCOUNTER
Patient c/o hematuria and abdominal pain. Advised per protocol to be seen in 24 hours. An appointment was scheduled for the patient. Advised the patient to call back with any further questions or if symptoms worsen.        Reason for Disposition   Blood in urine  (Exception: Could be normal menstrual bleeding.)    Additional Information   Negative: Shock suspected (e.g., cold/pale/clammy skin, too weak to stand, low BP, rapid pulse)   Negative: Sounds like a life-threatening emergency to the triager   Negative: Recent back or abdominal injury   Negative: Recent genital injury   Negative: [1] Unable to urinate (or only a few drops) > 4 hours AND [2] bladder feels very full (e.g., palpable bladder or strong urge to urinate)   Negative: [1] Diffuse (all over) muscle pains in the shoulders, arms, legs, and back AND [2] dark (cola or tea-colored) or red-colored urine   Negative: Passing pure blood or large blood clots (i.e., size > a dime)  (Exception: Fatimah or small strands.)   Negative: Fever > 100.4 F (38.0 C)   Negative: Patient sounds very sick or weak to the triager   Negative: [1] Pain or burning with passing urine AND [2] side (flank) or back pain present   Negative: Known sickle cell disease   Negative: Taking Coumadin (warfarin) or other strong blood thinner, or known bleeding disorder (e.g., thrombocytopenia)   Negative: Pain or burning with passing urine   Negative: Side (flank) or back pain present   Negative: [1] Pink or red-colored urine and likely from food (beets, rhubarb, red food dye) AND [2] lasts > 24 hours after stopping food    Protocols used: Urine - Blood In-A-

## 2024-10-07 ENCOUNTER — TELEPHONE (OUTPATIENT)
Dept: PULMONOLOGY | Facility: CLINIC | Age: 74
End: 2024-10-07
Payer: MEDICARE

## 2024-10-07 NOTE — TELEPHONE ENCOUNTER
----- Message from [employer] sent at 10/7/2024  8:50 AM CDT -----  Regarding: RECALL APPT  Contact: [phone]  Requesting Appt    Patient is requesting an appointment. No appt is available in Epic. Caller would like for a nurse/MA to call to schedule an appointment.    Name of Caller: Pt  Appointment Access: 3 moth follow Recall  Symptoms:COPD  Call Back Number: [phone]  Additional Information:

## 2024-10-09 ENCOUNTER — TELEPHONE (OUTPATIENT)
Dept: ENDOSCOPY | Facility: HOSPITAL | Age: 74
End: 2024-10-09
Payer: MEDICARE

## 2024-10-09 VITALS — WEIGHT: 136.44 LBS | HEIGHT: 68 IN | BODY MASS INDEX: 20.68 KG/M2

## 2024-10-09 DIAGNOSIS — K21.9 GASTROESOPHAGEAL REFLUX DISEASE WITHOUT ESOPHAGITIS: Primary | ICD-10-CM

## 2024-10-09 DIAGNOSIS — K44.9 HIATAL HERNIA: ICD-10-CM

## 2024-10-09 NOTE — TELEPHONE ENCOUNTER
"----- Message from KANDI Mckeon sent at 9/19/2024  2:00 PM CDT -----  Regarding: Scheduling an impedance  Procedure: 24 pH Impedance    Diagnosis: Hiatal hernia      Procedure Timing: Within 4 weeks (Urgent)    #If within 4 weeks selected, please pedro as high priority#    #If greater than 12 weeks, please select "5-12 weeks" and delay sending until 3 months prior to requested date#       Additional Scheduling Information:     Is the patient taking a GLP-1 Agonist and/or blood thinner :yes    Have you attached a patient to this message: yes  "

## 2024-10-09 NOTE — TELEPHONE ENCOUNTER
24-hour pH with Impedance Instructions    Date of procedure: 11/13/24 Arrive at: 7:00AM    Location of Department:   [facility] - [address] [place] [address]  Take the [facility] to 4th Floor Endoscopy Lab    You will be discharged home with a small catheter in your nose which will be connected to the pH test equipment. This equipment will need to be returned to the endoscopy lab the following day and remove the pH catheter.     How to prep:         7 days before your scheduled procedure 11/6/24      Stop taking your acid reducing medication.      Day Before Procedure 11/12/24     You may have a light evening meal.   No solid food after 7:00 pm.   You may have clear liquids until midnight.  See below for list.      Day of the Procedure 11/13/24           What You CANNOT do:   Do not drink milk or anything colored red.  Do not drink alcohol.  No gum chewing or candy morning of procedure.    Liquids That Are OK to Drink:   Water  Sports drinks (Gatorade, Power-Aid)  Coffee or tea (no cream or nondairy creamer)  Clear juices without pulp (apple, white grape)  Gelatin desserts (no fruit or toppings)  Clear soda (sprite, coke, ginger ale)  Chicken broth (until 12 midnight the night before procedure)      Comments:

## 2024-10-09 NOTE — TELEPHONE ENCOUNTER
Spoke to [facility] to schedule procedure(s) 24 hr pH Impedance       Physician to perform procedure(s) Dr. ROBERT Mendoza  Date of Procedure (s) 11/13/24  Arrival Time 7:00 AM  Time of Procedure(s) 8:00 AM   Location of Procedure(s) [facility] 4th Floor  Type of Rx Prep sent to patient: Other  Instructions provided to patient via Postal Mail    Patient was informed on the following information and verbalized understanding. Screening questionnaire reviewed with patient and complete. No ride arrangements are required for this procedure.   Appointment details are tentative, especially check-in time. Patient will receive a prep-op call 7 days prior to confirm check-in time for procedure. If applicable the patient should contact their pharmacy to verify Rx for procedure prep is ready for pick-up. Patient was advised to call the scheduling department at [phone] if pharmacy states no Rx is available. Patient was advised to call the endoscopy scheduling department if any questions or concerns arise.       Endoscopy Scheduling Department

## 2024-10-10 ENCOUNTER — TELEPHONE (OUTPATIENT)
Dept: FAMILY MEDICINE | Facility: CLINIC | Age: 74
End: 2024-10-10

## 2024-10-10 ENCOUNTER — OFFICE VISIT (OUTPATIENT)
Dept: FAMILY MEDICINE | Facility: CLINIC | Age: 74
End: 2024-10-10
Payer: MEDICARE

## 2024-10-10 VITALS
HEART RATE: 89 BPM | DIASTOLIC BLOOD PRESSURE: 73 MMHG | WEIGHT: 141.75 LBS | RESPIRATION RATE: 18 BRPM | BODY MASS INDEX: 21.48 KG/M2 | HEIGHT: 68 IN | OXYGEN SATURATION: 99 % | SYSTOLIC BLOOD PRESSURE: 105 MMHG | TEMPERATURE: 98 F

## 2024-10-10 DIAGNOSIS — Z79.899 DRUG-INDUCED IMMUNODEFICIENCY: ICD-10-CM

## 2024-10-10 DIAGNOSIS — D84.821 DRUG-INDUCED IMMUNODEFICIENCY: ICD-10-CM

## 2024-10-10 DIAGNOSIS — I10 ESSENTIAL HYPERTENSION: ICD-10-CM

## 2024-10-10 DIAGNOSIS — J84.9 INTERSTITIAL LUNG DISEASE: ICD-10-CM

## 2024-10-10 DIAGNOSIS — E11.65 TYPE 2 DIABETES MELLITUS WITH HYPERGLYCEMIA, WITHOUT LONG-TERM CURRENT USE OF INSULIN: Primary | ICD-10-CM

## 2024-10-10 DIAGNOSIS — I50.22 CHRONIC HFREF (HEART FAILURE WITH REDUCED EJECTION FRACTION): ICD-10-CM

## 2024-10-10 PROCEDURE — 4010F ACE/ARB THERAPY RXD/TAKEN: CPT | Mod: CPTII,S$GLB,, | Performed by: NURSE PRACTITIONER

## 2024-10-10 PROCEDURE — 99999 PR PBB SHADOW E&M-EST. PATIENT-LVL V: CPT | Mod: PBBFAC,,, | Performed by: NURSE PRACTITIONER

## 2024-10-10 PROCEDURE — 3008F BODY MASS INDEX DOCD: CPT | Mod: CPTII,S$GLB,, | Performed by: NURSE PRACTITIONER

## 2024-10-10 PROCEDURE — 3288F FALL RISK ASSESSMENT DOCD: CPT | Mod: CPTII,S$GLB,, | Performed by: NURSE PRACTITIONER

## 2024-10-10 PROCEDURE — 1160F RVW MEDS BY RX/DR IN RCRD: CPT | Mod: CPTII,S$GLB,, | Performed by: NURSE PRACTITIONER

## 2024-10-10 PROCEDURE — 99214 OFFICE O/P EST MOD 30 MIN: CPT | Mod: S$GLB,,, | Performed by: NURSE PRACTITIONER

## 2024-10-10 PROCEDURE — 1111F DSCHRG MED/CURRENT MED MERGE: CPT | Mod: CPTII,S$GLB,, | Performed by: NURSE PRACTITIONER

## 2024-10-10 PROCEDURE — 1126F AMNT PAIN NOTED NONE PRSNT: CPT | Mod: CPTII,S$GLB,, | Performed by: NURSE PRACTITIONER

## 2024-10-10 PROCEDURE — 3078F DIAST BP <80 MM HG: CPT | Mod: CPTII,S$GLB,, | Performed by: NURSE PRACTITIONER

## 2024-10-10 PROCEDURE — 3052F HG A1C>EQUAL 8.0%<EQUAL 9.0%: CPT | Mod: CPTII,S$GLB,, | Performed by: NURSE PRACTITIONER

## 2024-10-10 PROCEDURE — 3062F POS MACROALBUMINURIA REV: CPT | Mod: CPTII,S$GLB,, | Performed by: NURSE PRACTITIONER

## 2024-10-10 PROCEDURE — 1101F PT FALLS ASSESS-DOCD LE1/YR: CPT | Mod: CPTII,S$GLB,, | Performed by: NURSE PRACTITIONER

## 2024-10-10 PROCEDURE — 3074F SYST BP LT 130 MM HG: CPT | Mod: CPTII,S$GLB,, | Performed by: NURSE PRACTITIONER

## 2024-10-10 PROCEDURE — 1159F MED LIST DOCD IN RCRD: CPT | Mod: CPTII,S$GLB,, | Performed by: NURSE PRACTITIONER

## 2024-10-10 PROCEDURE — 2023F DILAT RTA XM W/O RTNOPTHY: CPT | Mod: CPTII,S$GLB,, | Performed by: NURSE PRACTITIONER

## 2024-10-10 PROCEDURE — 3066F NEPHROPATHY DOC TX: CPT | Mod: CPTII,S$GLB,, | Performed by: NURSE PRACTITIONER

## 2024-10-10 RX ORDER — GLIMEPIRIDE 2 MG/1
2 TABLET ORAL 2 TIMES DAILY
Qty: 180 TABLET | Refills: 0 | Status: SHIPPED | OUTPATIENT
Start: 2024-10-10

## 2024-10-10 RX ORDER — METOPROLOL SUCCINATE 50 MG/1
50 TABLET, EXTENDED RELEASE ORAL DAILY
Qty: 90 TABLET | Refills: 0 | Status: SHIPPED | OUTPATIENT
Start: 2024-10-10

## 2024-10-10 NOTE — PROGRESS NOTES
Routine Office Visit    Patient Name: Emmanuel Grant    : 1950  MRN: [id number]    Chief Complaint:  Hospital follow-up    Subjective:  Emmanuel is a 73 y.o. male who presents today for a hospital follow-up.  Discharge summary is as follows in bold:    [facility] Medicine  Discharge Summary        Patient Name: Emmanuel Grant  MRN: [id number]  ALVARO: [id number]  Patient Class: IP- Inpatient  Admission Date: 2024  Hospital Length of Stay: 3 days  Discharge Date and Time:  2024 12:55 PM  Attending Physician: Domo Benito MD   Discharging Provider: Domo Benito MD  Primary Care Provider: Wilfredo De Souza MD     Primary Care Team: Networked reference to record PCT      HPI:   73-year-old male with PMH of recently diagnosed interstitial lung disease, chronic hypoxic respiratory failure on 2 L oxygen at home, CAD s/p PCI, history of stroke, chronic systolic heart failure, hypertension, diabetes mellitus, hyperlipidemia, GERD, BPH presented to ER with complaints of chest pain.       Patient is a very poor historian and seems to have poor understanding of his medical issues.  Patient reports that he had onset of substernal and epigastric chest pain 2 days ago.  He came to the ER and was discharged home day before yesterday.  His symptoms continued and he decided come to ER for further evaluation today.  On my evaluation patient reports that his chest pain has resolved as of now.  He does report ongoing shortness of breath with even mild exertion and that has been getting worse for last few weeks.  He has been using his nebulizer more frequently with some relief.  Of note he was recently diagnosed with I LD and saw a pulmonologist who started him on CellCept.  Patient states that he only took 1 dose of CellCept which made his head go crazy.  He has been having respiratory issues for last 6 months to 1 year and has been on oxygen for last 3-4 months.  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     On presentation to ER he was hypoxic requiring increased oxygen than his baseline, tachycardic, hypertensive, lab work significant for D-dimer 0.85, CT angio chest was done which was negative for PE but did show chronic interstitial lung disease.  Troponins were negative and EKG was nonischemic.  He was given a dose of magnesium sulfate, Solu-Medrol and multiple breathing treatments in the ER.  ER requested admitting the patient for ongoing care given increased oxygen requirements.     PMH: Recently diagnosed ILD, chronic hypoxic respiratory failure on 2 L oxygen at home, CAD s/p PCI, history of stroke, chronic systolic heart failure, hypertension, diabetes mellitus, hyperlipidemia, GERD, BPH   PSH: PCI   SH:  Denies smoking but has been exposed to secondhand smoke during going ears.  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  Denies alcohol use, denies recreational drug use.    FH: Reviewed and not pertinent.     * No surgery found *       Hospital Course:   73-year-old male with PMH of recently diagnosed interstitial lung disease, chronic hypoxic respiratory failure on 2 L oxygen at home, CAD s/p PCI, history of stroke, chronic systolic heart failure, hypertension, diabetes mellitus, hyperlipidemia, GERD, BPH presented to ER with complaints of chest pain.       Patient is a very poor historian and seems to have poor understanding of his medical issues.  Patient reports that he had onset of substernal and epigastric chest pain 2 days ago.  He came to the ER and was discharged home day before yesterday.  His symptoms continued and he decided come to ER for further evaluation today.  On my evaluation patient reports that his chest pain has resolved as of now.  He does report ongoing shortness of breath with even mild exertion and that has been getting worse for last few weeks.  He has been using his nebulizer more  frequently with some relief.  Of note he was recently diagnosed with I LD and saw a pulmonologist who started him on CellCept.  Patient states that he only took 1 dose of CellCept which made his head go crazy.  He has been having respiratory issues for last 6 months to 1 year and has been on oxygen for last 3-4 months.  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     On presentation to ER he was hypoxic requiring increased oxygen than his baseline, tachycardic, hypertensive, lab work significant for D-dimer 0.85, CT angio chest was done which was negative for PE but did show chronic interstitial lung disease.  Troponins were negative and EKG was nonischemic.  He was given a dose of magnesium sulfate, Solu-Medrol and multiple breathing treatments in the ER.  ER requested admitting the patient for ongoing care given increased oxygen requirements.     PMH: Recently diagnosed ILD, chronic hypoxic respiratory failure on 2 L oxygen at home, CAD s/p PCI, history of stroke, chronic systolic heart failure, hypertension, diabetes mellitus, hyperlipidemia, GERD, BPH   PSH: PCI   SH:  Denies smoking but has been exposed to secondhand smoke during going ears.  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  Denies alcohol use, denies recreational drug use.    FH: Reviewed and not pertinent.  Patient was admitted to the floor and evaluated by pulmonology. Steroids were quickly deescalated but still patient was hyperglycemic for couple of days. Treatment regimen was adjusted, coreg was substituted for Toprol to minimize impact on breathing and bronchospasm, case management was consulted for home health agency referral and PFT.New prescriptions were sent to pharmacy, including steroid taper.  He is medically stable and cleared for discharge home.       Goals of Care Treatment Preferences:  Code Status: Full Code    Patient reports today alone for hospital  follow-up.  Since discharge, he reports that his breathing is stable but he is still very short of breath with minimal exertion such as eating or doing anything around the house.  No reported chest pain or palpitations or leg swelling.  He seems to have some confusion around his medications.  He has brought his medications with him today and there are duplicate bottles of multiple medications including mycophenolate.  He also has an old prescription for glimepiride 4 mg tablets; he states he is taking 1/2 of the 4 mg tablets once a day instead of the 2 mg prescribed twice a day.  He also has a prescription for 500 mg tablets of the mycophenolate which he believes he is taking along with the 250 mg tablets.  He is also still taking the carvedilol along with the metoprolol.    He does have his Dexcom in and with him today.  Spot blood glucose checked on the Dexcom was in the 190s.  Dexcom monitoring states average blood sugars 221 over the last 7 days.  He does have a follow-up with endocrinology next month scheduled already.  He does take the metformin 1000 mg b.i.d.    He does use the albuterol nebulizer.  He reports not being on any daily medications.  He is not on any p.o. steroids at this time.  He states he lives alone.  He states a home health agency had called him to set up a visit at his home but they have not gotten back with him yet.  He is on 2 L via continuous nasal cannula.    Past Medical History  Past Medical History:   Diagnosis Date    CAD (coronary artery disease)     Sees [facility], h/o stent    Diabetes mellitus     Hypertension     Kidney stone     Stroke     age 62       Family History  Family History   Problem Relation Name Age of Onset    Cancer Mother      Colon cancer Sister      Esophageal cancer Neg Hx         Current Medications  Current Outpatient Medications on File Prior to Visit   Medication Sig Dispense Refill    albuterol-ipratropium (DUO-NEB) 2.5 mg-0.5 mg/3 mL nebulizer solution Take 3  "mLs by nebulization every 6 (six) hours as needed for Wheezing. Rescue 75 mL 11    alpha-D-galactosidase (BEANO ORAL) Take 1 tablet by mouth 2 (two) times a day.      amLODIPine (NORVASC) 10 MG tablet Take 1 tablet (10 mg total) by mouth once daily. 90 tablet 3    aspirin 81 MG Chew Take 81 mg by mouth once daily.      atorvastatin (LIPITOR) 40 MG tablet Take 1 tablet (40 mg total) by mouth every evening. 90 tablet 1    cholecalciferol, vitamin D3, (VITAMIN D3) 50 mcg (2,000 unit) Cap capsule 1 capsule.      furosemide (LASIX) 20 MG tablet Take 1 tablet (20 mg total) by mouth once daily. 90 tablet 3    metFORMIN (GLUCOPHAGE) 1000 MG tablet Take 1 tablet (1,000 mg total) by mouth 2 (two) times daily with meals.      mirabegron (MYRBETRIQ) 25 mg Tb24 ER tablet Take 1 tablet (25 mg total) by mouth once daily. 30 tablet 11    mycophenolate (CELLCEPT) 250 mg Cap Take 2 capsules (500 mg total) by mouth 2 (two) times daily. 120 capsule 0    sacubitriL-valsartan (ENTRESTO) 24-26 mg per tablet Take 1 tablet by mouth 2 (two) times daily. 180 tablet 3    sucralfate (CARAFATE) 1 gram tablet Take 1 g by mouth 4 (four) times daily.      tamsulosin (FLOMAX) 0.4 mg Cap Take 1 capsule (0.4 mg total) by mouth once daily.      [DISCONTINUED] glimepiride (AMARYL) 2 MG tablet Take 2 mg by mouth 2 (two) times a day.      [DISCONTINUED] metoprolol succinate (TOPROL-XL) 50 MG 24 hr tablet Take 1 tablet (50 mg total) by mouth once daily. 30 tablet 0     No current facility-administered medications on file prior to visit.       Allergies   Review of patient's allergies indicates:   Allergen Reactions    Dapagliflozin      Other reaction(s): Other (See Comments)    Pcn [penicillins]     Linagliptin Other (See Comments)     "it knocked me down", "it almost killed me"    Lisinopril Other (See Comments)     cough    Pantoprazole Hives       Review of Systems (Pertinent positives)  Review of Systems   Constitutional:  Positive for " "malaise/fatigue. Negative for chills and fever.   HENT: Negative.  Negative for congestion, sinus pain and sore throat.    Eyes: Negative.    Respiratory:  Positive for cough, shortness of breath and wheezing.    Cardiovascular:  Negative for chest pain, palpitations, orthopnea and claudication.   Gastrointestinal: Negative.  Negative for abdominal pain, diarrhea, nausea and vomiting.   Genitourinary: Negative.  Negative for dysuria, frequency and urgency.   Musculoskeletal: Negative.  Negative for back pain, joint pain and neck pain.   Skin: Negative.    Neurological: Negative.  Negative for dizziness, tingling, loss of consciousness and headaches.   Endo/Heme/Allergies: Negative.    Psychiatric/Behavioral: Negative.         /73   Pulse 89   Temp 97.8 °F (36.6 °C) (Oral)   Resp 18   Ht 5' 8" (1.727 m)   Wt 64.3 kg (141 lb 12.1 oz)   SpO2 99%   BMI 21.55 kg/m²     Physical Exam  Vitals reviewed.   Constitutional:       General: He is not in acute distress.     Appearance: Normal appearance. He is not ill-appearing, toxic-appearing or diaphoretic.      Comments: Patient is speaking in complete sentences.  Resting comfortably in examination room   HENT:      Head: Normocephalic and atraumatic.   Cardiovascular:      Rate and Rhythm: Normal rate and regular rhythm.      Pulses: Normal pulses.      Heart sounds: Normal heart sounds.   Pulmonary:      Effort: Pulmonary effort is normal. No respiratory distress.      Breath sounds: Examination of the right-lower field reveals rales. Examination of the left-lower field reveals rales. Rales present. No wheezing.   Abdominal:      General: Bowel sounds are normal. There is no distension.      Palpations: Abdomen is soft.      Tenderness: There is no abdominal tenderness.   Musculoskeletal:         General: No swelling, tenderness or deformity. Normal range of motion.   Skin:     General: Skin is warm and dry.      Capillary Refill: Capillary refill takes less " than 2 seconds.   Neurological:      General: No focal deficit present.      Mental Status: He is alert and oriented to person, place, and time.   Psychiatric:         Mood and Affect: Mood normal.         Behavior: Behavior normal.            Assessment/Plan:  Emmanuel Grant is a 73 y.o. male who presents today for :    Emmanuel was seen today for fatigue.    Diagnoses and all orders for this visit:    Type 2 diabetes mellitus with hyperglycemia, without long-term current use of insulin  -     glimepiride (AMARYL) 2 MG tablet; Take 1 tablet (2 mg total) by mouth 2 (two) times a day. Take with meals    Drug-induced immunodeficiency    Chronic HFrEF (heart failure with reduced ejection fraction)  -     Ambulatory referral/consult to Home Health; Future    Essential hypertension  -     metoprolol succinate (TOPROL-XL) 50 MG 24 hr tablet; Take 1 tablet (50 mg total) by mouth once daily.  -     BASIC METABOLIC PANEL; Future    Interstitial lung disease  -     Ambulatory referral/consult to Home Health; Future    I had a lengthy and detailed discussion with the patient regarding his conditions and further workup/treatment going forward.      He does seem to have some confusion about his medications.  I refilled the medications as needed for him and instructed him to start the metoprolol without the carvedilol.  He was only taking 2 mg of the glimepiride daily, so I did refill this as prescribed which may help with his blood sugars.  We reviewed that his prescribed mycophenolate regimen is 500 mg b.i.d..  I instructed him to discard of his old medications and he states he will bring them to the pharmacy to be disposed of.    He is clinically euvolemic.  BP under good control.  Still having significant shortness of breath since hospitalization albeit stable.  I will reach out to his pulmonologist to see if he needs a sooner in-person appointment or if they recommend starting a steroid taper or a daily inhaler.    All  questions answered.  Patient does have an in-person follow up with his PCP scheduled for next month already.  He should maintain this appointment.  I did place a referral to home health for the patient so he can get set up with physical therapy and intermittent nursing assessments.        This office note has been dictated.  This dictation has been generated using M-Modal Fluency Direct dictation; some phonetic errors may occur.

## 2024-10-10 NOTE — TELEPHONE ENCOUNTER
----- Message from Kamaljit sent at 10/10/2024  3:51 PM CDT -----  Regarding: Self  Who Called:     Symptoms (please be specific):  Pt states the medication is supposed to help him speak but its not helping anymore, coughing , he stated every 10 mins it starts and he has to take another pill.     How long has patient had these symptoms:   Pt states a long time     Pharmacy: .  [facility] [id number] [place] [address] AT [address] [address]  Phone: [phone] Fax: [phone]        Would the patient rather a call back or a response via My [facility]?  Call back     Best Call Back Number: . .[phone]        Additional Information:sucralfate (CARAFATE) 1 gram tablet

## 2024-10-10 NOTE — TELEPHONE ENCOUNTER
Spoke with pt, pt states that he's taking sucralfate 1mg before and after meals and also take medication throughout the day to prevent coughing. Pt c/o speech issues and wants to know if its due to medication. Pt was seen in office today by ANABELA Yañez.

## 2024-10-10 NOTE — PATIENT INSTRUCTIONS
STOP taking carvedilol, START metoprolol    TAKE Mycophenolate 500 mg twice a day    I will send you a new dose of your glimepride to be taken twice a day

## 2024-10-11 ENCOUNTER — TELEPHONE (OUTPATIENT)
Dept: FAMILY MEDICINE | Facility: CLINIC | Age: 74
End: 2024-10-11
Payer: MEDICARE

## 2024-10-11 ENCOUNTER — HOSPITAL ENCOUNTER (INPATIENT)
Facility: HOSPITAL | Age: 74
LOS: 1 days | Discharge: HOME | DRG: 197 | End: 2024-10-12
Attending: EMERGENCY MEDICINE | Admitting: STUDENT IN AN ORGANIZED HEALTH CARE EDUCATION/TRAINING PROGRAM
Payer: MEDICARE

## 2024-10-11 ENCOUNTER — NURSE TRIAGE (OUTPATIENT)
Dept: ADMINISTRATIVE | Facility: CLINIC | Age: 74
End: 2024-10-11
Payer: MEDICARE

## 2024-10-11 DIAGNOSIS — R07.9 CHEST PAIN: ICD-10-CM

## 2024-10-11 DIAGNOSIS — R06.02 SHORTNESS OF BREATH: ICD-10-CM

## 2024-10-11 DIAGNOSIS — R09.02 HYPOXIA: Primary | ICD-10-CM

## 2024-10-11 LAB
ALBUMIN SERPL BCP-MCNC: 3.1 G/DL (ref 3.5–5.2)
ALLENS TEST: ABNORMAL
ALP SERPL-CCNC: 44 U/L (ref 55–135)
ALT SERPL W/O P-5'-P-CCNC: 27 U/L (ref 10–44)
ANION GAP SERPL CALC-SCNC: 10 MMOL/L (ref 8–16)
AST SERPL-CCNC: 20 U/L (ref 10–40)
BASOPHILS # BLD AUTO: 0.01 K/UL (ref 0–0.2)
BASOPHILS NFR BLD: 0.1 % (ref 0–1.9)
BILIRUB SERPL-MCNC: 0.5 MG/DL (ref 0.1–1)
BNP SERPL-MCNC: 67 PG/ML (ref 0–99)
BUN SERPL-MCNC: 14 MG/DL (ref 8–23)
CALCIUM SERPL-MCNC: 8.6 MG/DL (ref 8.7–10.5)
CHLORIDE SERPL-SCNC: 101 MMOL/L (ref 95–110)
CO2 SERPL-SCNC: 27 MMOL/L (ref 23–29)
CREAT SERPL-MCNC: 1 MG/DL (ref 0.5–1.4)
CTP QC/QA: YES
CTP QC/QA: YES
DIFFERENTIAL METHOD BLD: ABNORMAL
EOSINOPHIL # BLD AUTO: 0.5 K/UL (ref 0–0.5)
EOSINOPHIL NFR BLD: 4.8 % (ref 0–8)
ERYTHROCYTE [DISTWIDTH] IN BLOOD BY AUTOMATED COUNT: 14.3 % (ref 11.5–14.5)
EST. GFR  (NO RACE VARIABLE): >60 ML/MIN/1.73 M^2
GLUCOSE SERPL-MCNC: 145 MG/DL (ref 70–110)
HCO3 UR-SCNC: 32.1 MMOL/L (ref 24–28)
HCT VFR BLD AUTO: 40.6 % (ref 40–54)
HGB BLD-MCNC: 13.8 G/DL (ref 14–18)
IMM GRANULOCYTES # BLD AUTO: 0.04 K/UL (ref 0–0.04)
IMM GRANULOCYTES NFR BLD AUTO: 0.4 % (ref 0–0.5)
LYMPHOCYTES # BLD AUTO: 1.9 K/UL (ref 1–4.8)
LYMPHOCYTES NFR BLD: 20 % (ref 18–48)
MAGNESIUM SERPL-MCNC: 1.5 MG/DL (ref 1.6–2.6)
MCH RBC QN AUTO: 28.5 PG (ref 27–31)
MCHC RBC AUTO-ENTMCNC: 34 G/DL (ref 32–36)
MCV RBC AUTO: 84 FL (ref 82–98)
MONOCYTES # BLD AUTO: 0.9 K/UL (ref 0.3–1)
MONOCYTES NFR BLD: 9.8 % (ref 4–15)
NEUTROPHILS # BLD AUTO: 6.1 K/UL (ref 1.8–7.7)
NEUTROPHILS NFR BLD: 64.9 % (ref 38–73)
NRBC BLD-RTO: 0 /100 WBC
PCO2 BLDA: 49.9 MMHG (ref 35–45)
PH SMN: 7.42 [PH] (ref 7.35–7.45)
PLATELET # BLD AUTO: 128 K/UL (ref 150–450)
PMV BLD AUTO: 9.5 FL (ref 9.2–12.9)
PO2 BLDA: 28 MMHG (ref 40–60)
POC BE: 6 MMOL/L
POC MOLECULAR INFLUENZA A AGN: NEGATIVE
POC MOLECULAR INFLUENZA B AGN: NEGATIVE
POC SATURATED O2: 53 % (ref 95–100)
POC TCO2: 34 MMOL/L (ref 24–29)
POTASSIUM SERPL-SCNC: 3.2 MMOL/L (ref 3.5–5.1)
PROT SERPL-MCNC: 6 G/DL (ref 6–8.4)
RBC # BLD AUTO: 4.84 M/UL (ref 4.6–6.2)
SAMPLE: ABNORMAL
SARS-COV-2 RDRP RESP QL NAA+PROBE: NEGATIVE
SITE: ABNORMAL
SODIUM SERPL-SCNC: 138 MMOL/L (ref 136–145)
TROPONIN I SERPL DL<=0.01 NG/ML-MCNC: 0.01 NG/ML (ref 0–0.03)
WBC # BLD AUTO: 9.37 K/UL (ref 3.9–12.7)

## 2024-10-11 PROCEDURE — 25000003 PHARM REV CODE 250: Performed by: STUDENT IN AN ORGANIZED HEALTH CARE EDUCATION/TRAINING PROGRAM

## 2024-10-11 PROCEDURE — 93010 ELECTROCARDIOGRAM REPORT: CPT | Mod: ,,, | Performed by: INTERNAL MEDICINE

## 2024-10-11 PROCEDURE — 94640 AIRWAY INHALATION TREATMENT: CPT

## 2024-10-11 PROCEDURE — 94761 N-INVAS EAR/PLS OXIMETRY MLT: CPT | Mod: XB

## 2024-10-11 PROCEDURE — 83880 ASSAY OF NATRIURETIC PEPTIDE: CPT | Performed by: STUDENT IN AN ORGANIZED HEALTH CARE EDUCATION/TRAINING PROGRAM

## 2024-10-11 PROCEDURE — 83735 ASSAY OF MAGNESIUM: CPT | Performed by: STUDENT IN AN ORGANIZED HEALTH CARE EDUCATION/TRAINING PROGRAM

## 2024-10-11 PROCEDURE — 99285 EMERGENCY DEPT VISIT HI MDM: CPT | Mod: 25

## 2024-10-11 PROCEDURE — 63600175 PHARM REV CODE 636 W HCPCS: Performed by: STUDENT IN AN ORGANIZED HEALTH CARE EDUCATION/TRAINING PROGRAM

## 2024-10-11 PROCEDURE — 80180 DRUG SCRN QUAN MYCOPHENOLATE: CPT | Performed by: STUDENT IN AN ORGANIZED HEALTH CARE EDUCATION/TRAINING PROGRAM

## 2024-10-11 PROCEDURE — 12000002 HC ACUTE/MED SURGE SEMI-PRIVATE ROOM

## 2024-10-11 PROCEDURE — 84484 ASSAY OF TROPONIN QUANT: CPT | Performed by: STUDENT IN AN ORGANIZED HEALTH CARE EDUCATION/TRAINING PROGRAM

## 2024-10-11 PROCEDURE — 99900035 HC TECH TIME PER 15 MIN (STAT)

## 2024-10-11 PROCEDURE — 11000001 HC ACUTE MED/SURG PRIVATE ROOM

## 2024-10-11 PROCEDURE — 27000221 HC OXYGEN, UP TO 24 HOURS

## 2024-10-11 PROCEDURE — 80053 COMPREHEN METABOLIC PANEL: CPT | Performed by: STUDENT IN AN ORGANIZED HEALTH CARE EDUCATION/TRAINING PROGRAM

## 2024-10-11 PROCEDURE — 85025 COMPLETE CBC W/AUTO DIFF WBC: CPT | Performed by: STUDENT IN AN ORGANIZED HEALTH CARE EDUCATION/TRAINING PROGRAM

## 2024-10-11 PROCEDURE — 25000242 PHARM REV CODE 250 ALT 637 W/ HCPCS: Performed by: STUDENT IN AN ORGANIZED HEALTH CARE EDUCATION/TRAINING PROGRAM

## 2024-10-11 PROCEDURE — 87502 INFLUENZA DNA AMP PROBE: CPT

## 2024-10-11 PROCEDURE — 82803 BLOOD GASES ANY COMBINATION: CPT

## 2024-10-11 PROCEDURE — 96365 THER/PROPH/DIAG IV INF INIT: CPT

## 2024-10-11 PROCEDURE — 87635 SARS-COV-2 COVID-19 AMP PRB: CPT | Performed by: STUDENT IN AN ORGANIZED HEALTH CARE EDUCATION/TRAINING PROGRAM

## 2024-10-11 PROCEDURE — 93005 ELECTROCARDIOGRAM TRACING: CPT

## 2024-10-11 RX ORDER — METOPROLOL SUCCINATE 50 MG/1
50 TABLET, EXTENDED RELEASE ORAL DAILY
Status: DISCONTINUED | OUTPATIENT
Start: 2024-10-12 | End: 2024-10-12 | Stop reason: HOSPADM

## 2024-10-11 RX ORDER — FUROSEMIDE 20 MG/1
20 TABLET ORAL DAILY
Status: DISCONTINUED | OUTPATIENT
Start: 2024-10-12 | End: 2024-10-12 | Stop reason: HOSPADM

## 2024-10-11 RX ORDER — MYCOPHENOLATE MOFETIL 250 MG/1
500 CAPSULE ORAL 2 TIMES DAILY
Status: DISCONTINUED | OUTPATIENT
Start: 2024-10-11 | End: 2024-10-12 | Stop reason: HOSPADM

## 2024-10-11 RX ORDER — NAPROXEN SODIUM 220 MG/1
81 TABLET, FILM COATED ORAL DAILY
Status: DISCONTINUED | OUTPATIENT
Start: 2024-10-12 | End: 2024-10-12 | Stop reason: HOSPADM

## 2024-10-11 RX ORDER — IBUPROFEN 200 MG
16 TABLET ORAL
Status: DISCONTINUED | OUTPATIENT
Start: 2024-10-11 | End: 2024-10-12 | Stop reason: HOSPADM

## 2024-10-11 RX ORDER — PREDNISONE 20 MG/1
20 TABLET ORAL DAILY
Status: DISCONTINUED | OUTPATIENT
Start: 2024-10-12 | End: 2024-10-12 | Stop reason: HOSPADM

## 2024-10-11 RX ORDER — BISACODYL 10 MG/1
10 SUPPOSITORY RECTAL DAILY PRN
Status: DISCONTINUED | OUTPATIENT
Start: 2024-10-11 | End: 2024-10-12 | Stop reason: HOSPADM

## 2024-10-11 RX ORDER — ACETAMINOPHEN 325 MG/1
650 TABLET ORAL EVERY 4 HOURS PRN
Status: DISCONTINUED | OUTPATIENT
Start: 2024-10-11 | End: 2024-10-12 | Stop reason: HOSPADM

## 2024-10-11 RX ORDER — ONDANSETRON HYDROCHLORIDE 2 MG/ML
4 INJECTION, SOLUTION INTRAVENOUS EVERY 8 HOURS PRN
Status: DISCONTINUED | OUTPATIENT
Start: 2024-10-11 | End: 2024-10-12 | Stop reason: HOSPADM

## 2024-10-11 RX ORDER — AMLODIPINE BESYLATE 5 MG/1
10 TABLET ORAL DAILY
Status: DISCONTINUED | OUTPATIENT
Start: 2024-10-12 | End: 2024-10-12 | Stop reason: HOSPADM

## 2024-10-11 RX ORDER — INSULIN ASPART 100 [IU]/ML
0-10 INJECTION, SOLUTION INTRAVENOUS; SUBCUTANEOUS
Status: DISCONTINUED | OUTPATIENT
Start: 2024-10-11 | End: 2024-10-12 | Stop reason: HOSPADM

## 2024-10-11 RX ORDER — SODIUM,POTASSIUM PHOSPHATES 280-250MG
2 POWDER IN PACKET (EA) ORAL
Status: DISCONTINUED | OUTPATIENT
Start: 2024-10-11 | End: 2024-10-12 | Stop reason: HOSPADM

## 2024-10-11 RX ORDER — GLUCAGON 1 MG
1 KIT INJECTION
Status: DISCONTINUED | OUTPATIENT
Start: 2024-10-11 | End: 2024-10-12 | Stop reason: HOSPADM

## 2024-10-11 RX ORDER — LANOLIN ALCOHOL/MO/W.PET/CERES
800 CREAM (GRAM) TOPICAL
Status: DISCONTINUED | OUTPATIENT
Start: 2024-10-11 | End: 2024-10-12 | Stop reason: HOSPADM

## 2024-10-11 RX ORDER — PREDNISONE 20 MG/1
20 TABLET ORAL
Status: COMPLETED | OUTPATIENT
Start: 2024-10-11 | End: 2024-10-11

## 2024-10-11 RX ORDER — SIMETHICONE 80 MG
1 TABLET,CHEWABLE ORAL 4 TIMES DAILY PRN
Status: DISCONTINUED | OUTPATIENT
Start: 2024-10-11 | End: 2024-10-12 | Stop reason: HOSPADM

## 2024-10-11 RX ORDER — IPRATROPIUM BROMIDE AND ALBUTEROL SULFATE 2.5; .5 MG/3ML; MG/3ML
3 SOLUTION RESPIRATORY (INHALATION) EVERY 6 HOURS
Status: DISCONTINUED | OUTPATIENT
Start: 2024-10-12 | End: 2024-10-12 | Stop reason: HOSPADM

## 2024-10-11 RX ORDER — IPRATROPIUM BROMIDE AND ALBUTEROL SULFATE 2.5; .5 MG/3ML; MG/3ML
3 SOLUTION RESPIRATORY (INHALATION) EVERY 6 HOURS PRN
Status: DISCONTINUED | OUTPATIENT
Start: 2024-10-11 | End: 2024-10-12 | Stop reason: HOSPADM

## 2024-10-11 RX ORDER — ENOXAPARIN SODIUM 100 MG/ML
40 INJECTION SUBCUTANEOUS EVERY 24 HOURS
Status: DISCONTINUED | OUTPATIENT
Start: 2024-10-11 | End: 2024-10-12 | Stop reason: HOSPADM

## 2024-10-11 RX ORDER — IBUPROFEN 200 MG
24 TABLET ORAL
Status: DISCONTINUED | OUTPATIENT
Start: 2024-10-11 | End: 2024-10-12 | Stop reason: HOSPADM

## 2024-10-11 RX ORDER — SODIUM CHLORIDE 0.9 % (FLUSH) 0.9 %
10 SYRINGE (ML) INJECTION EVERY 12 HOURS PRN
Status: DISCONTINUED | OUTPATIENT
Start: 2024-10-11 | End: 2024-10-12 | Stop reason: HOSPADM

## 2024-10-11 RX ORDER — TAMSULOSIN HYDROCHLORIDE 0.4 MG/1
0.4 CAPSULE ORAL DAILY
Status: DISCONTINUED | OUTPATIENT
Start: 2024-10-12 | End: 2024-10-12 | Stop reason: HOSPADM

## 2024-10-11 RX ORDER — NALOXONE HCL 0.4 MG/ML
0.02 VIAL (ML) INJECTION
Status: DISCONTINUED | OUTPATIENT
Start: 2024-10-11 | End: 2024-10-12 | Stop reason: HOSPADM

## 2024-10-11 RX ORDER — MAGNESIUM SULFATE HEPTAHYDRATE 40 MG/ML
2 INJECTION, SOLUTION INTRAVENOUS ONCE
Status: COMPLETED | OUTPATIENT
Start: 2024-10-11 | End: 2024-10-11

## 2024-10-11 RX ORDER — TALC
6 POWDER (GRAM) TOPICAL NIGHTLY PRN
Status: DISCONTINUED | OUTPATIENT
Start: 2024-10-11 | End: 2024-10-12 | Stop reason: HOSPADM

## 2024-10-11 RX ORDER — ACETAMINOPHEN 325 MG/1
650 TABLET ORAL EVERY 8 HOURS PRN
Status: DISCONTINUED | OUTPATIENT
Start: 2024-10-11 | End: 2024-10-12 | Stop reason: HOSPADM

## 2024-10-11 RX ORDER — PROCHLORPERAZINE EDISYLATE 5 MG/ML
5 INJECTION INTRAMUSCULAR; INTRAVENOUS EVERY 6 HOURS PRN
Status: DISCONTINUED | OUTPATIENT
Start: 2024-10-11 | End: 2024-10-12 | Stop reason: HOSPADM

## 2024-10-11 RX ORDER — IPRATROPIUM BROMIDE AND ALBUTEROL SULFATE 2.5; .5 MG/3ML; MG/3ML
3 SOLUTION RESPIRATORY (INHALATION)
Status: COMPLETED | OUTPATIENT
Start: 2024-10-11 | End: 2024-10-11

## 2024-10-11 RX ORDER — AMOXICILLIN 250 MG
1 CAPSULE ORAL DAILY PRN
Status: DISCONTINUED | OUTPATIENT
Start: 2024-10-11 | End: 2024-10-12 | Stop reason: HOSPADM

## 2024-10-11 RX ORDER — ALUMINUM HYDROXIDE, MAGNESIUM HYDROXIDE, AND SIMETHICONE 1200; 120; 1200 MG/30ML; MG/30ML; MG/30ML
30 SUSPENSION ORAL 4 TIMES DAILY PRN
Status: DISCONTINUED | OUTPATIENT
Start: 2024-10-11 | End: 2024-10-12 | Stop reason: HOSPADM

## 2024-10-11 RX ORDER — ATORVASTATIN CALCIUM 40 MG/1
40 TABLET, FILM COATED ORAL NIGHTLY
Status: DISCONTINUED | OUTPATIENT
Start: 2024-10-11 | End: 2024-10-12 | Stop reason: HOSPADM

## 2024-10-11 RX ADMIN — MYCOPHENOLATE MOFETIL 500 MG: 250 CAPSULE ORAL at 11:10

## 2024-10-11 RX ADMIN — ENOXAPARIN SODIUM 40 MG: 40 INJECTION SUBCUTANEOUS at 11:10

## 2024-10-11 RX ADMIN — PREDNISONE 20 MG: 20 TABLET ORAL at 09:10

## 2024-10-11 RX ADMIN — MAGNESIUM SULFATE HEPTAHYDRATE 2 G: 40 INJECTION, SOLUTION INTRAVENOUS at 09:10

## 2024-10-11 RX ADMIN — POTASSIUM BICARBONATE 40 MEQ: 391 TABLET, EFFERVESCENT ORAL at 09:10

## 2024-10-11 RX ADMIN — IPRATROPIUM BROMIDE AND ALBUTEROL SULFATE 3 ML: 2.5; .5 SOLUTION RESPIRATORY (INHALATION) at 05:10

## 2024-10-11 RX ADMIN — SACUBITRIL AND VALSARTAN 1 TABLET: 24; 26 TABLET, FILM COATED ORAL at 11:10

## 2024-10-11 RX ADMIN — ATORVASTATIN CALCIUM 40 MG: 40 TABLET, FILM COATED ORAL at 11:10

## 2024-10-11 NOTE — ED PROVIDER NOTES
"Encounter Date: 10/11/2024       History     Chief Complaint   Patient presents with    Shortness of Breath     Pt BIB EMS, c/o SOB. Pt has chronic SOB but worsened a few hours ago. Pt denies any CP, abd pain or n/v/d     HPI    73-year-old male with significant medical history of coronary artery disease, stroke, hypertension, diabetes, nephrolithiasis, hyperlipidemia, GERD, presenting for shortness of breath.      On chart review, patient was last seen by pulmonology on 09/23/2024 for interstitial lung disease management.  He recently completed a steroid taper and still endorsed shortness of breath and coughing episodes.  He was started recently on CellCept.     Review of patient's allergies indicates:   Allergen Reactions    Dapagliflozin      Other reaction(s): Other (See Comments)    Pcn [penicillins]     Linagliptin Other (See Comments)     "it knocked me down", "it almost killed me"    Lisinopril Other (See Comments)     cough    Pantoprazole Hives     Past Medical History:   Diagnosis Date    CAD (coronary artery disease)     Sees [facility], h/o stent    Diabetes mellitus     Hypertension     Kidney stone     Stroke     age 62     Past Surgical History:   Procedure Laterality Date    CARDIAC CATHETERIZATION      with stent    COLONOSCOPY N/A 03/27/2024    Procedure: COLONOSCOPY;  Surgeon: Ariela Castro MD;  Location: [facility] ENDO;  Service: Endoscopy;  Laterality: N/A;    ESOPHAGOGASTRODUODENOSCOPY N/A 03/27/2024    Procedure: EGD (ESOPHAGOGASTRODUODENOSCOPY);  Surgeon: Ariela Castro MD;  Location: [facility] ENDO;  Service: Endoscopy;  Laterality: N/A;    LEFT HEART CATHETERIZATION Left 9/10/2024    Procedure: Left heart cath;  Surgeon: Jemal Drummond MD;  Location: [facility] CATH LAB;  Service: Cardiology;  Laterality: Left;    SHOULDER SURGERY Right      Family History   Problem Relation Name Age of Onset    Cancer Mother      Colon cancer Sister      Esophageal cancer Neg Hx   " "    Social History     Tobacco Use    Smoking status: Never     Passive exposure: Never    Smokeless tobacco: Never   Substance Use Topics    Alcohol use: No    Drug use: Never     Review of Systems    Physical Exam     Initial Vitals [10/11/24 1441]   BP Pulse Resp Temp SpO2   116/73 91 18 98 °F (36.7 °C) 96 %      MAP       --         Physical Exam    ED Course   Procedures  Labs Reviewed - No data to display       Imaging Results    None          Medications - No data to display  Medical Decision Making                                    Clinical Impression:   ***Please document a Clinical Impression and click the "Refresh" button to refresh your note and automatically pull in before signing.***           "

## 2024-10-11 NOTE — ED PROVIDER NOTES
"Encounter Date: 10/11/2024       History     Chief Complaint   Patient presents with    Shortness of Breath     Pt BIB EMS, c/o SOB. Pt has chronic SOB but worsened a few hours ago. Pt denies any CP, abd pain or n/v/d     HPI    73-year-old male with significant medical history of coronary artery disease, stroke, hypertension, diabetes, nephrolithiasis, hyperlipidemia, GERD, presenting for shortness of breath.    Patient reports 1 year progressive worsening shortness of breath.  He endorses feeling severe dyspnea on exertion at home progressing to dyspnea at rest.  He also endorses a dry chronic progressive worsening cough.  He reached out to his primary care physician earlier who told him to proceed to the ER for further management.  He ran out of his home albuterol nebulizers as well.  He denies chest pain, abdominal pain, nausea, vomiting, fever, chills, diarrhea, constipation, dysuria.    On chart review, patient was last seen by pulmonology on 09/23/2024 for interstitial lung disease management.  He recently completed a steroid taper and still endorsed shortness of breath and coughing episodes.  He was started recently on CellCept.     Review of patient's allergies indicates:   Allergen Reactions    Dapagliflozin      Other reaction(s): Other (See Comments)    Pcn [penicillins]     Linagliptin Other (See Comments)     "it knocked me down", "it almost killed me"    Lisinopril Other (See Comments)     cough    Pantoprazole Hives     Past Medical History:   Diagnosis Date    CAD (coronary artery disease)     Sees [facility], h/o stent    Diabetes mellitus     Hypertension     Kidney stone     Stroke     age 62     Past Surgical History:   Procedure Laterality Date    CARDIAC CATHETERIZATION      with stent    COLONOSCOPY N/A 03/27/2024    Procedure: COLONOSCOPY;  Surgeon: Ariela Castro MD;  Location: [facility];  Service: Endoscopy;  Laterality: N/A;    ESOPHAGOGASTRODUODENOSCOPY N/A 03/27/2024    Procedure: " EGD (ESOPHAGOGASTRODUODENOSCOPY);  Surgeon: Ariela Castro MD;  Location: [facility] ENDO;  Service: Endoscopy;  Laterality: N/A;    LEFT HEART CATHETERIZATION Left 9/10/2024    Procedure: Left heart cath;  Surgeon: Jemal Drummond MD;  Location: [facility] CATH LAB;  Service: Cardiology;  Laterality: Left;    SHOULDER SURGERY Right      Family History   Problem Relation Name Age of Onset    Cancer Mother      Colon cancer Sister      Esophageal cancer Neg Hx       Social History     Tobacco Use    Smoking status: Never     Passive exposure: Never    Smokeless tobacco: Never   Substance Use Topics    Alcohol use: No    Drug use: Never     Review of Systems  See HPI for pertinent ROS.   Physical Exam     Initial Vitals [10/11/24 1441]   BP Pulse Resp Temp SpO2   116/73 91 18 98 °F (36.7 °C) 96 %      MAP       --         Physical Exam    Constitutional: He appears well-developed and well-nourished.   HENT:   Head: Normocephalic and atraumatic.   Eyes: Pupils are equal, round, and reactive to light. No scleral icterus.   Neck: No JVD present.   Normal range of motion.  Cardiovascular:  Normal rate and regular rhythm.     Exam reveals no gallop and no friction rub.       No murmur heard.  Pulmonary/Chest: No stridor. He has no wheezes. He has no rhonchi. He has no rales.   Decreased air movement bilaterally, increased work of breathing, tachypneic.   Abdominal: Abdomen is soft. There is no abdominal tenderness. There is no rebound and no guarding.   Musculoskeletal:         General: No tenderness or edema.      Cervical back: Normal range of motion.     Neurological: He is alert. GCS score is 15. GCS eye subscore is 4. GCS verbal subscore is 5. GCS motor subscore is 6.   Skin: Skin is warm. Capillary refill takes less than 2 seconds.   Psychiatric: He has a normal mood and affect.         ED Course   Procedures  Labs Reviewed   MAGNESIUM - Abnormal       Result Value    Magnesium 1.5 (*)    COMPREHENSIVE METABOLIC PANEL  - Abnormal    Sodium 138      Potassium 3.2 (*)     Chloride 101      CO2 27      Glucose 145 (*)     BUN 14      Creatinine 1.0      Calcium 8.6 (*)     Total Protein 6.0      Albumin 3.1 (*)     Total Bilirubin 0.5      Alkaline Phosphatase 44 (*)     AST 20      ALT 27      eGFR >60      Anion Gap 10     CBC W/ AUTO DIFFERENTIAL - Abnormal    WBC 9.37      RBC 4.84      Hemoglobin 13.8 (*)     Hematocrit 40.6      MCV 84      MCH 28.5      MCHC 34.0      RDW 14.3      Platelets 128 (*)     MPV 9.5      Immature Granulocytes 0.4      Gran # (ANC) 6.1      Immature Grans (Abs) 0.04      Lymph # 1.9      Mono # 0.9      Eos # 0.5      Baso # 0.01      nRBC 0      Gran % 64.9      Lymph % 20.0      Mono % 9.8      Eosinophil % 4.8      Basophil % 0.1      Differential Method Automated     ISTAT PROCEDURE - Abnormal    POC PH 7.416      POC PCO2 49.9 (*)     POC PO2 28 (*)     POC HCO3 32.1 (*)     POC BE 6 (*)     POC SATURATED O2 53      POC TCO2 34 (*)     Sample VENOUS      Site Other      Allens Test N/A     TROPONIN I    Troponin I 0.006     B-TYPE NATRIURETIC PEPTIDE    BNP 67     MYCOPHENOLIC ACID   SARS-COV-2 RDRP GENE    POC Rapid COVID Negative       Acceptable Yes     POCT INFLUENZA A/B MOLECULAR    POC Molecular Influenza A Ag Negative      POC Molecular Influenza B Ag Negative       Acceptable Yes          ECG Results              EKG 12-lead (Preliminary result)  Result time 10/11/24 20:39:07      Wet Read by Irish Donaldson MD (10/11/24 20:39:07, [facility], Emergency Medicine)    Normal sinus rhythm, rate 88 beats per minute, normal RI interval,  milliseconds, no STEMI.                                  Imaging Results              X-Ray Chest PA And Lateral (Final result)  Result time 10/11/24 17:41:41      Final result by Elidia Dent MD (10/11/24 17:41:41)                   Impression:      No acute intrathoracic abnormality.  Chronic  interstitial lung disease or pulmonary fibrosis.      Electronically signed by: Elidia Dent  Date:    10/11/2024  Time:    17:41               Narrative:    EXAMINATION:  CHEST PA AND LATERAL    CLINICAL HISTORY:  Shortness of breath    TECHNIQUE:  PA and lateral chest radiograph    COMPARISON:  09/25/2024    FINDINGS:  The cardiac silhouette is within normal limits.   There is no focal consolidation, pneumothorax, or pleural effusion.  There are chronic interstitial lung findings are pulmonary fibrosis.  There are no superimposed new areas of focal consolidation.  There is no pneumothorax or significant pleural effusion.  There is mild asymmetric elevation of the right hemidiaphragm.                                       Medications   magnesium sulfate 2g in water 50mL IVPB (premix) (2 g Intravenous New Bag 10/11/24 2121)   albuterol-ipratropium 2.5 mg-0.5 mg/3 mL nebulizer solution 3 mL (has no administration in time range)   amLODIPine tablet 10 mg (has no administration in time range)   aspirin chewable tablet 81 mg (has no administration in time range)   atorvastatin tablet 40 mg (has no administration in time range)   furosemide tablet 20 mg (has no administration in time range)   metoprolol succinate (TOPROL-XL) 24 hr tablet 50 mg (has no administration in time range)   mycophenolate capsule 500 mg (has no administration in time range)   sacubitriL-valsartan 24-26 mg per tablet 1 tablet (has no administration in time range)   tamsulosin 24 hr capsule 0.4 mg (has no administration in time range)   sodium chloride 0.9% flush 10 mL (has no administration in time range)   melatonin tablet 6 mg (has no administration in time range)   ondansetron injection 4 mg (has no administration in time range)   prochlorperazine injection Soln 5 mg (has no administration in time range)   senna-docusate 8.6-50 mg per tablet 1 tablet (has no administration in time range)   bisacodyL suppository 10 mg (has no administration  in time range)   acetaminophen tablet 650 mg (has no administration in time range)   simethicone chewable tablet 80 mg (has no administration in time range)   aluminum-magnesium hydroxide-simethicone 200-200-20 mg/5 mL suspension 30 mL (has no administration in time range)   acetaminophen tablet 650 mg (has no administration in time range)   naloxone 0.4 mg/mL injection 0.02 mg (has no administration in time range)   potassium bicarbonate disintegrating tablet 50 mEq (has no administration in time range)   potassium bicarbonate disintegrating tablet 35 mEq (has no administration in time range)   potassium bicarbonate disintegrating tablet 60 mEq (has no administration in time range)   magnesium oxide tablet 800 mg (has no administration in time range)   magnesium oxide tablet 800 mg (has no administration in time range)   potassium, sodium phosphates 280-160-250 mg packet 2 packet (has no administration in time range)   potassium, sodium phosphates 280-160-250 mg packet 2 packet (has no administration in time range)   potassium, sodium phosphates 280-160-250 mg packet 2 packet (has no administration in time range)   glucose chewable tablet 16 g (has no administration in time range)   glucose chewable tablet 24 g (has no administration in time range)   glucagon (human recombinant) injection 1 mg (has no administration in time range)   enoxaparin injection 40 mg (has no administration in time range)   insulin aspart U-100 pen 0-10 Units (has no administration in time range)   albuterol-ipratropium 2.5 mg-0.5 mg/3 mL nebulizer solution 3 mL (3 mLs Nebulization Given 10/11/24 1721)   potassium bicarbonate disintegrating tablet 40 mEq (40 mEq Oral Given 10/11/24 2123)   predniSONE tablet 20 mg (20 mg Oral Given 10/11/24 2122)     Medical Decision Making             ED Course as of 10/11/24 2237   Fri Oct 11, 2024   1825 X-Ray Chest PA And Lateral  No acute intrathoracic abnormality.  Chronic interstitial lung disease or  pulmonary fibrosis. [KB]   2010 CBC auto differential(!)  No evidence of leukocytosis, acute anemia requiring transfusion or thrombocytopenia.   [KB]   2037 CBC auto differential(!)  No evidence of leukocytosis, acute anemia requiring transfusion or thrombocytopenia.   [KB]   2038 Comprehensive metabolic panel(!)  Hypokalemia and hypomagnesemia, no EKATERINA, no significant hepatobiliary obstruction on lab [KB]   2038 POCT COVID-19 Rapid Screening [KB]   2038 POCT Influenza A/B Molecular [KB]   2038 Brain natriuretic peptide  CHF exacerbation less likely [KB]      ED Course User Index  [KB] Tanesha Jordan MD                         73-year-old male described as above presenting for progressive worsening shortness of breath and cough.  On presentation, vitals stable, afebrile.  He was mildly tachypneic.  On physical exam patient poorly moving air bilaterally.  He was given a DuoNeb with modest improvement of his work of breathing.  He was restarted on his home 2 L via nasal cannula.  See ED course for personal interpretation of results.  Discussed patient with pulmonology who agreed with starting prednisone 20 mg daily x7 days with plan to taper to 10 mg for 7 days after, and plans to evaluate patient tomorrow while inpatient.  Walking pulse ox completed and patient desaturated on his home 2 L to 88% with increased work of breathing.  ED would like to admit for interstitial lung disease, acute on chronic hypoxia.       Clinical Impression:  Final diagnoses:  [R06.02] Shortness of breath  [R09.02] Hypoxia (Primary)          ED Disposition Condition    Admit                 Tanesha Jordan MD  Resident  10/11/24 1175

## 2024-10-11 NOTE — TELEPHONE ENCOUNTER
Pt reports difficulty breathing and cough that began a couple of days ago and has worsened. Pt reports difficulty speaking. Pt sounds labored on call. Care advice to call  now. Pt alone at home and agreed to have me call EMS for him. Dany roldan 911 contacted for pt. Dispatcher given pt information and verbalized that he is sending someone to pt's residence. Pt made aware and verbalizes understanding. Pt alone; I remained on the line with the pt until EMS arrived then ended call with pt.  Reason for Disposition   Slow, shallow and weak breathing    Additional Information   Negative: SEVERE difficulty breathing (e.g., struggling for each breath, speaks in single words, pulse > 120)   Negative: Breathing stopped and hasn't returned   Negative: Choking on something   Negative: Bluish (or gray) lips or face   Negative: Difficult to awaken or acting confused (e.g., disoriented, slurred speech)   Negative: Passed out (e.g., fainted, lost consciousness, blacked out and was not responding)   Negative: Wheezing started suddenly after medicine, an allergic food, or bee sting   Negative: Stridor (harsh sound while breathing in)    Protocols used: Breathing Difficulty-A-OH

## 2024-10-11 NOTE — ED TRIAGE NOTES
Pt BIB EMS c/o worsening chronic SOB and increased cough since today. Pt denies any CP, n/v/d, or urinary symptoms. Pmhx of HTN, DM, stroke and CAD. Pt endorses taking home meds for symptom management with no reilef. Pt frequently coughing and breathing is labored. Pt oxygen saturation 96% on room air.

## 2024-10-12 VITALS
OXYGEN SATURATION: 95 % | HEIGHT: 68 IN | SYSTOLIC BLOOD PRESSURE: 115 MMHG | RESPIRATION RATE: 17 BRPM | BODY MASS INDEX: 20.75 KG/M2 | DIASTOLIC BLOOD PRESSURE: 74 MMHG | HEART RATE: 76 BPM | TEMPERATURE: 98 F | WEIGHT: 136.88 LBS

## 2024-10-12 LAB
ALBUMIN SERPL BCP-MCNC: 3.1 G/DL (ref 3.5–5.2)
ALP SERPL-CCNC: 44 U/L (ref 55–135)
ALT SERPL W/O P-5'-P-CCNC: 26 U/L (ref 10–44)
ANION GAP SERPL CALC-SCNC: 8 MMOL/L (ref 8–16)
AST SERPL-CCNC: 18 U/L (ref 10–40)
BASOPHILS # BLD AUTO: 0.01 K/UL (ref 0–0.2)
BASOPHILS NFR BLD: 0.1 % (ref 0–1.9)
BILIRUB SERPL-MCNC: 0.6 MG/DL (ref 0.1–1)
BUN SERPL-MCNC: 16 MG/DL (ref 8–23)
CALCIUM SERPL-MCNC: 8.9 MG/DL (ref 8.7–10.5)
CHLORIDE SERPL-SCNC: 102 MMOL/L (ref 95–110)
CO2 SERPL-SCNC: 27 MMOL/L (ref 23–29)
CREAT SERPL-MCNC: 1 MG/DL (ref 0.5–1.4)
DIFFERENTIAL METHOD BLD: ABNORMAL
EOSINOPHIL # BLD AUTO: 0.1 K/UL (ref 0–0.5)
EOSINOPHIL NFR BLD: 1 % (ref 0–8)
ERYTHROCYTE [DISTWIDTH] IN BLOOD BY AUTOMATED COUNT: 14.4 % (ref 11.5–14.5)
EST. GFR  (NO RACE VARIABLE): >60 ML/MIN/1.73 M^2
GLUCOSE SERPL-MCNC: 276 MG/DL (ref 70–110)
HCT VFR BLD AUTO: 42.1 % (ref 40–54)
HGB BLD-MCNC: 13.9 G/DL (ref 14–18)
IMM GRANULOCYTES # BLD AUTO: 0.04 K/UL (ref 0–0.04)
IMM GRANULOCYTES NFR BLD AUTO: 0.6 % (ref 0–0.5)
LYMPHOCYTES # BLD AUTO: 1 K/UL (ref 1–4.8)
LYMPHOCYTES NFR BLD: 13.7 % (ref 18–48)
MAGNESIUM SERPL-MCNC: 2.1 MG/DL (ref 1.6–2.6)
MCH RBC QN AUTO: 27.9 PG (ref 27–31)
MCHC RBC AUTO-ENTMCNC: 33 G/DL (ref 32–36)
MCV RBC AUTO: 85 FL (ref 82–98)
MONOCYTES # BLD AUTO: 0.3 K/UL (ref 0.3–1)
MONOCYTES NFR BLD: 3.7 % (ref 4–15)
NEUTROPHILS # BLD AUTO: 5.7 K/UL (ref 1.8–7.7)
NEUTROPHILS NFR BLD: 80.9 % (ref 38–73)
NRBC BLD-RTO: 0 /100 WBC
OHS QRS DURATION: 96 MS
OHS QTC CALCULATION: 438 MS
PHOSPHATE SERPL-MCNC: 2.4 MG/DL (ref 2.7–4.5)
PLATELET # BLD AUTO: 139 K/UL (ref 150–450)
PMV BLD AUTO: 10.8 FL (ref 9.2–12.9)
POCT GLUCOSE: 252 MG/DL (ref 70–110)
POCT GLUCOSE: 279 MG/DL (ref 70–110)
POCT GLUCOSE: 304 MG/DL (ref 70–110)
POTASSIUM SERPL-SCNC: 4.5 MMOL/L (ref 3.5–5.1)
PROT SERPL-MCNC: 6.1 G/DL (ref 6–8.4)
RBC # BLD AUTO: 4.98 M/UL (ref 4.6–6.2)
SODIUM SERPL-SCNC: 137 MMOL/L (ref 136–145)
WBC # BLD AUTO: 7.09 K/UL (ref 3.9–12.7)

## 2024-10-12 PROCEDURE — 63600175 PHARM REV CODE 636 W HCPCS: Performed by: STUDENT IN AN ORGANIZED HEALTH CARE EDUCATION/TRAINING PROGRAM

## 2024-10-12 PROCEDURE — 84100 ASSAY OF PHOSPHORUS: CPT | Performed by: STUDENT IN AN ORGANIZED HEALTH CARE EDUCATION/TRAINING PROGRAM

## 2024-10-12 PROCEDURE — 94761 N-INVAS EAR/PLS OXIMETRY MLT: CPT

## 2024-10-12 PROCEDURE — 25000003 PHARM REV CODE 250: Performed by: STUDENT IN AN ORGANIZED HEALTH CARE EDUCATION/TRAINING PROGRAM

## 2024-10-12 PROCEDURE — 94640 AIRWAY INHALATION TREATMENT: CPT

## 2024-10-12 PROCEDURE — 85025 COMPLETE CBC W/AUTO DIFF WBC: CPT | Performed by: STUDENT IN AN ORGANIZED HEALTH CARE EDUCATION/TRAINING PROGRAM

## 2024-10-12 PROCEDURE — 36415 COLL VENOUS BLD VENIPUNCTURE: CPT | Performed by: STUDENT IN AN ORGANIZED HEALTH CARE EDUCATION/TRAINING PROGRAM

## 2024-10-12 PROCEDURE — 27000221 HC OXYGEN, UP TO 24 HOURS

## 2024-10-12 PROCEDURE — 83735 ASSAY OF MAGNESIUM: CPT | Performed by: STUDENT IN AN ORGANIZED HEALTH CARE EDUCATION/TRAINING PROGRAM

## 2024-10-12 PROCEDURE — 25000242 PHARM REV CODE 250 ALT 637 W/ HCPCS: Performed by: STUDENT IN AN ORGANIZED HEALTH CARE EDUCATION/TRAINING PROGRAM

## 2024-10-12 PROCEDURE — 99223 1ST HOSP IP/OBS HIGH 75: CPT | Mod: ,,, | Performed by: INTERNAL MEDICINE

## 2024-10-12 PROCEDURE — 80053 COMPREHEN METABOLIC PANEL: CPT | Performed by: STUDENT IN AN ORGANIZED HEALTH CARE EDUCATION/TRAINING PROGRAM

## 2024-10-12 RX ORDER — AZITHROMYCIN 250 MG/1
TABLET, FILM COATED ORAL
Qty: 6 TABLET | Refills: 0 | Status: SHIPPED | OUTPATIENT
Start: 2024-10-12

## 2024-10-12 RX ORDER — METHYLPREDNISOLONE 4 MG/1
TABLET ORAL
Qty: 21 EACH | Refills: 0 | Status: SHIPPED | OUTPATIENT
Start: 2024-10-12

## 2024-10-12 RX ORDER — FUROSEMIDE 10 MG/ML
40 INJECTION INTRAMUSCULAR; INTRAVENOUS ONCE
Status: COMPLETED | OUTPATIENT
Start: 2024-10-12 | End: 2024-10-12

## 2024-10-12 RX ADMIN — PREDNISONE 20 MG: 20 TABLET ORAL at 08:10

## 2024-10-12 RX ADMIN — INSULIN ASPART 8 UNITS: 100 INJECTION, SOLUTION INTRAVENOUS; SUBCUTANEOUS at 08:10

## 2024-10-12 RX ADMIN — FUROSEMIDE 20 MG: 20 TABLET ORAL at 08:10

## 2024-10-12 RX ADMIN — IPRATROPIUM BROMIDE AND ALBUTEROL SULFATE 3 ML: 2.5; .5 SOLUTION RESPIRATORY (INHALATION) at 12:10

## 2024-10-12 RX ADMIN — INSULIN ASPART 3 UNITS: 100 INJECTION, SOLUTION INTRAVENOUS; SUBCUTANEOUS at 11:10

## 2024-10-12 RX ADMIN — IPRATROPIUM BROMIDE AND ALBUTEROL SULFATE 3 ML: 2.5; .5 SOLUTION RESPIRATORY (INHALATION) at 08:10

## 2024-10-12 RX ADMIN — MYCOPHENOLATE MOFETIL 500 MG: 250 CAPSULE ORAL at 08:10

## 2024-10-12 RX ADMIN — METOPROLOL SUCCINATE 50 MG: 50 TABLET, EXTENDED RELEASE ORAL at 08:10

## 2024-10-12 RX ADMIN — ASPIRIN 81 MG CHEWABLE TABLET 81 MG: 81 TABLET CHEWABLE at 08:10

## 2024-10-12 RX ADMIN — TAMSULOSIN HYDROCHLORIDE 0.4 MG: 0.4 CAPSULE ORAL at 08:10

## 2024-10-12 RX ADMIN — AMLODIPINE BESYLATE 10 MG: 5 TABLET ORAL at 08:10

## 2024-10-12 RX ADMIN — SACUBITRIL AND VALSARTAN 1 TABLET: 24; 26 TABLET, FILM COATED ORAL at 08:10

## 2024-10-12 RX ADMIN — FUROSEMIDE 40 MG: 10 INJECTION, SOLUTION INTRAVENOUS at 01:10

## 2024-10-12 NOTE — ED NOTES
the lowest pt dropped to was 88% while ambulating down hallway on 2L NC. fluctuated mainly between 90-94%. Pt denied any symptoms, reported feeling fine the entire walk. Provider notified.

## 2024-10-12 NOTE — NURSING
[facility] [place]  Nurses Note -- 4 Eyes    Admitted pt from ER, AAOx4, Vital signs stable, with dry cough. No complaints of Nausea and Vomiting, pain nor SOB, on 2L NC now on 1L with O2 sat 99%. Urinal provided and within reach. Bed in low position, wheels locked. Side rails up. Call light within reach.    10/12/2024       Skin assessed on: Admit      [x] No Pressure Injuries Present    [x]Prevention Measures Documented    [] Yes LDA  for Pressure Injury Previously documented     [] Yes New Pressure Injury Discovered   [] LDA for New Pressure Injury Added      Attending RN:  Princess Austin, RN     Second RN:  Maty Wallis RN

## 2024-10-12 NOTE — NURSING
Received pt from ED on 2L NC, now on room air with O2 sat 95%-97%, no complaint of SOB since arrival on floor. No any complaint at the moment. VS stable.

## 2024-10-12 NOTE — PLAN OF CARE
Problem: Adult Inpatient Plan of Care  Goal: Absence of Hospital-Acquired Illness or Injury  Outcome: Progressing  Intervention: Identify and Manage Fall Risk  Flowsheets (Taken 10/12/2024 0418)  Safety Promotion/Fall Prevention:   assistive device/personal item within reach   room near unit station   side rails raised x 2   medications reviewed   lighting adjusted   nonskid shoes/socks when out of bed  Intervention: Prevent Skin Injury  Flowsheets (Taken 10/12/2024 0418)  Body Position: position changed independently  Intervention: Prevent and Manage VTE (Venous Thromboembolism) Risk  Flowsheets (Taken 10/12/2024 0418)  VTE Prevention/Management: ambulation promoted  Goal: Optimal Comfort and Wellbeing  Outcome: Progressing  Intervention: Monitor Pain and Promote Comfort  Flowsheets (Taken 10/12/2024 0418)  Pain Management Interventions:   pillow support provided   quiet environment facilitated

## 2024-10-12 NOTE — PLAN OF CARE
Case Management Assessment     PCP: Wilfredo De Souza MD   [facility]armacy:   [id number] [place] [address] AT [facility] & [address] [address]  Phone: [phone] Fax: [phone]       Patient Arrived From: Pt's home  Existing Help at Home: Pt lives alone. Pt's family check on him most days via physical visit or phone call    Barriers to Discharge: None at this time    Discharge Plan:    A. Home   B. Home with family      CM met with pt at bedside to discuss discharge planning. Pt stated he uses O2 continuously.  He also has a nebulizer. Blood pressure machine and portable O2. Pt's family will provide transportation upon discharge. CM will follow up as needed.     10/12/24 1236   Discharge Assessment   Assessment Type Discharge Planning Assessment   Confirmed/corrected address, phone number and insurance Yes   Confirmed Demographics Correct on Facesheet   Source of Information patient   When was your last doctors appointment? 10/10/24   Communicated DERIC with patient/caregiver No   Reason For Admission Interstitial lung disease exacerbation   People in Home alone   Facility Arrived From: Pt's home   Do you expect to return to your current living situation? Yes   Do you have help at home or someone to help you manage your care at home? Yes   Who are your caregiver(s) and their phone number(s)? Emmanuel parada (son) [phone], Yimi Grant (son) [phone], Toan (relative) [phone]   Prior to hospitilization cognitive status: Alert/Oriented   Current cognitive status: Alert/Oriented   Equipment Currently Used at Home oxygen;nebulizer;blood pressure machine   Readmission within 30 days? Yes   Patient currently being followed by outpatient case management? No   Do you currently have service(s) that help you manage your care at home? No   Do you take prescription medications? Yes   Do you have prescription coverage? Yes   Coverage [email or web address] [facility]  [employer] [facility] [employer] -   Do you have any problems affording any of your prescribed medications? No   Is the patient taking medications as prescribed? yes   Who is going to help you get home at discharge? Pt's family   How do you get to doctors appointments? car, drives self;family or friend will provide   Are you on dialysis? No   Do you take coumadin? No   Discharge Plan A Home    Discharge Plan B Home with family   DME Needed Upon Discharge  other (see comments)  (TBD)   Discharge Plan discussed with: Patient   Transition of Care Barriers None   Physical Activity   On average, how many days per week do you engage in moderate to strenuous exercise (like a brisk walk)? 3 days   On average, how many minutes do you engage in exercise at this level? 30 min   Financial Resource Strain   How hard is it for you to pay for the very basics like food, housing, medical care, and heating? Not hard   Housing Stability   In the last 12 months, was there a time when you were not able to pay the mortgage or rent on time? N   At any time in the past 12 months, were you homeless or living in a shelter (including now)? N   Transportation Needs   Has the lack of transportation kept you from medical appointments, meetings, work or from getting things needed for daily living? No   Food Insecurity   Within the past 12 months, you worried that your food would run out before you got the money to buy more. Never true   Within the past 12 months, the food you bought just didn't last and you didn't have money to get more. Never true   Stress   Do you feel stress - tense, restless, nervous, or anxious, or unable to sleep at night because your mind is troubled all the time - these days? Not at all   Alcohol Use   Q1: How often do you have a drink containing alcohol? Never   Q2: How many drinks containing alcohol do you have on a typical day when you are drinking? None   Q3: How often do you have six or more drinks on one occasion?  Never   Utilities   In the past 12 months has the electric, gas, oil, or water company threatened to shut off services in your home? No   Health Literacy   How often do you need to have someone help you when you read instructions, pamphlets, or other written material from your doctor or pharmacy? Sometimes

## 2024-10-12 NOTE — ASSESSMENT & PLAN NOTE
Patients blood pressure range in the last 24 hours was: BP  Min: 105/73  Max: 146/92.The patient's inpatient anti-hypertensive regimen is listed below:  Current Antihypertensives  amLODIPine tablet 10 mg, Daily, Oral  furosemide tablet 20 mg, Daily, Oral  metoprolol succinate (TOPROL-XL) 24 hr tablet 50 mg, Daily, Oral    Plan  - BP is controlled, no changes needed to their regimen

## 2024-10-12 NOTE — ASSESSMENT & PLAN NOTE
Not sure that this constitutes a true ILD exacerabtion, but his shortness of breath is worse than at baseline.  He is responding to current therapy and I recommend to continue.  - duonebs scheduled q6hr  - prednisone 20mg x 1 week, then 10mg x 1 week, then 5mg x 1 week, then stop.  - continue MMF 100mg BID  - if symptoms fail to improve, then obtain CT thorax without contrast.  - if symptoms persist, would recommend diuresis trial with 40mg IV lasix x 1

## 2024-10-12 NOTE — ED NOTES
Received report from KANDI Cervantes. Introduced self at bedside, pt resting comfortably, denies any pain at this time. Pt is CAMRON Montemayor.

## 2024-10-12 NOTE — ASSESSMENT & PLAN NOTE
Patient with known non obstructive CAD which is controlled Will continue ASA and Statin and monitor for S/Sx of angina/ACS. Continue to monitor on telemetry.

## 2024-10-12 NOTE — PLAN OF CARE
Case Management Final Discharge Note      Discharge Disposition: home    New DME ordered / company name: none    Relevant SDOH / Transition of Care Barriers:  none    Primary Caretaker and contact information: sons help at home as needed    Scheduled followup appointment: PCP on AVS    Referrals placed: per MD    Transportation: private vehicle    Patient and family educated on discharge services and updated on DC plan. Bedside RN notified, patient clear to discharge from Case Management Perspective.        10/12/24 1250   Final Note   Assessment Type Final Discharge Note   Anticipated Discharge Disposition Home   Hospital Resources/Appts/Education Provided Appointments scheduled and added to AVS   Post-Acute Status   Post-Acute Authorization Other   Other Status No Post-Acute Service Needs   Discharge Delays None known at this time

## 2024-10-12 NOTE — SUBJECTIVE & OBJECTIVE
"Past Medical History:   Diagnosis Date    CAD (coronary artery disease)     Sees [facility], h/o stent    Diabetes mellitus     Hypertension     Kidney stone     Stroke     age 62       Past Surgical History:   Procedure Laterality Date    CARDIAC CATHETERIZATION      with stent    COLONOSCOPY N/A 03/27/2024    Procedure: COLONOSCOPY;  Surgeon: Ariela Castro MD;  Location: [facility] ENDO;  Service: Endoscopy;  Laterality: N/A;    ESOPHAGOGASTRODUODENOSCOPY N/A 03/27/2024    Procedure: EGD (ESOPHAGOGASTRODUODENOSCOPY);  Surgeon: Ariela Castro MD;  Location: [facility] ENDO;  Service: Endoscopy;  Laterality: N/A;    LEFT HEART CATHETERIZATION Left 9/10/2024    Procedure: Left heart cath;  Surgeon: Jemal Drummond MD;  Location: [facility] CATH LAB;  Service: Cardiology;  Laterality: Left;    SHOULDER SURGERY Right        Review of patient's allergies indicates:   Allergen Reactions    Dapagliflozin      Other reaction(s): Other (See Comments)    Pcn [penicillins]     Linagliptin Other (See Comments)     "it knocked me down", "it almost killed me"    Lisinopril Other (See Comments)     cough    Pantoprazole Hives       Family History       Problem Relation (Age of Onset)    Cancer Mother    Colon cancer Sister          Tobacco Use    Smoking status: Never     Passive exposure: Never    Smokeless tobacco: Never   Substance and Sexual Activity    Alcohol use: No    Drug use: Never    Sexual activity: Not Currently         Review of Systems   Constitutional:  Positive for activity change and fatigue.   HENT:  Negative for congestion.    Respiratory:  Positive for cough and shortness of breath.    Cardiovascular:  Negative for chest pain and leg swelling.   Neurological: Negative.    Psychiatric/Behavioral: Negative.       Objective:     Vital Signs (Most Recent):  Temp: 98.3 °F (36.8 °C) (10/12/24 0811)  Pulse: 68 (10/12/24 0858)  Resp: 17 (10/12/24 0858)  BP: 123/81 (10/12/24 0811)  SpO2: 97 % (10/12/24 0858) Vital Signs " (24h Range):  Temp:  [98 °F (36.7 °C)-98.4 °F (36.9 °C)] 98.3 °F (36.8 °C)  Pulse:  [68-95] 68  Resp:  [17-24] 17  SpO2:  [95 %-100 %] 97 %  BP: (116-146)/(73-92) 123/81     Weight: 62.1 kg (136 lb 14.5 oz)  Body mass index is 20.82 kg/m².      Intake/Output Summary (Last 24 hours) at 10/12/2024 1119  Last data filed at 10/12/2024 0811  Gross per 24 hour   Intake 290 ml   Output 300 ml   Net -10 ml        Physical Exam  Constitutional:       General: He is not in acute distress.     Appearance: Normal appearance. He is normal weight. He is not ill-appearing, toxic-appearing or diaphoretic.   HENT:      Head: Normocephalic and atraumatic.      Nose: Nose normal.      Mouth/Throat:      Mouth: Mucous membranes are moist.   Eyes:      Extraocular Movements: Extraocular movements intact.      Pupils: Pupils are equal, round, and reactive to light.   Cardiovascular:      Rate and Rhythm: Normal rate and regular rhythm.      Heart sounds: No murmur heard.     No friction rub. No gallop.   Pulmonary:      Effort: Pulmonary effort is normal.      Breath sounds: Rales present.   Abdominal:      General: Abdomen is flat. There is no distension.      Palpations: Abdomen is soft.      Tenderness: There is no abdominal tenderness.   Musculoskeletal:         General: Normal range of motion.      Cervical back: Normal range of motion.      Right lower leg: No edema.      Left lower leg: No edema.   Skin:     General: Skin is warm.   Neurological:      General: No focal deficit present.      Mental Status: He is alert and oriented to person, place, and time. Mental status is at baseline.   Psychiatric:         Mood and Affect: Mood normal.         Behavior: Behavior normal.         Thought Content: Thought content normal.         Judgment: Judgment normal.          Vents:       Lines/Drains/Airways       Peripheral Intravenous Line  Duration                  Peripheral IV - Single Lumen 10/11/24 1443 20 G Anterior;Left Forearm <1  day                    Significant Labs:    CBC/Anemia Profile:  Recent Labs   Lab 10/11/24  1941 10/12/24  0411   WBC 9.37 7.09   HGB 13.8* 13.9*   HCT 40.6 42.1   * 139*   MCV 84 85   RDW 14.3 14.4        Chemistries:  Recent Labs   Lab 10/11/24  1941 10/12/24  0411    137   K 3.2* 4.5    102   CO2 27 27   BUN 14 16   CREATININE 1.0 1.0   CALCIUM 8.6* 8.9   ALBUMIN 3.1* 3.1*   PROT 6.0 6.1   BILITOT 0.5 0.6   ALKPHOS 44* 44*   ALT 27 26   AST 20 18   MG 1.5* 2.1   PHOS  --  2.4*       All pertinent labs within the past 24 hours have been reviewed.    Significant Imaging:   I have reviewed all pertinent imaging results/findings within the past 24 hours.

## 2024-10-12 NOTE — DISCHARGE SUMMARY
[facility] Medicine  Discharge Summary      Patient Name: Emmanuel Grant  MRN: [id number]  ALVARO: [id number]  Patient Class: IP- Inpatient  Admission Date: 10/11/2024  Hospital Length of Stay: 1 days  Discharge Date and Time:  10/12/2024 11:34 AM  Attending Physician: Kristian Han MD   Discharging Provider: Kristian Han MD  Primary Care Provider: Wilfredo De Souza MD    Primary Care Team: Networked reference to record PCT     HPI:   This is a 73-year-old male with a past medical history of ILD (on O2), CAD, HFrEF (EF: 40%), hypertension, type 2 diabetes, hyperlipidemia, CVA, BPH who presents with shortness of breath.     Patient presents for evaluation of shortness of breath that worsened on the day of presentation.  He endorses progressively worsening dyspnea, associated with a productive cough with white sputum.  Associated symptoms include posttussive emesis.  He reports compliance with his medications.  He denies exposure to smoke/sick contacts.    In the ED, the patient was tachypneic (20s), and was placed on 2 L O2.  Labs were remarkable for hypokalemia (3.2), hypomagnesemia (1.5), negative BNP (67), negative troponin (0.006).  VBG showed a pH of 7.41, pCO2 of 49.9.  Chest x-ray showed chronic interstitial lung disease or pulmonary fibrosis.  Pulmonary recommended a steroid taper.  Patient was given potassium bicarbonate 40 mEq, magnesium sulfate 2 g IV, DuoNeb x1, prednisone 20 mg p.o..  He was admitted for further management.    * No surgery found *      Hospital Course:   Came in w worsening SOB, steroids and breathing tx given, improving.   On RA now    Take Steroid dose pack and Azithromycin dose pack until gone, as directed on boxes  Come back if you have sustained fevers or worsening SOB  Adjust insulin at home with steroid use  Follow up with your Pulmonologist      Thank you for trusting [facility] and me with your care.  We are honored that you entrusted us with your  healthcare needs. Your satisfaction is very important to us and we hope you have been very pleased with your experience at [facility]. After your discharge you may receive a survey asking you to rate your hospital experience- Please help us by completing this survey. We hope that you have received the very best care possible during your hospitalization at [facility], as your satisfaction is our top priority.    Let me know if there is anything more I can do!!              Kristian Han MD        Medical Director        Section Head of         Board-Certified IM Attending      Goals of Care Treatment Preferences:  Code Status: Full Code      SDOH Screening:  The patient was screened for utility difficulties, food insecurity, transport difficulties, housing insecurity, and interpersonal safety and there were no concerns identified this admission.     Consults:   Consults (From admission, onward)          Status Ordering Provider     Inpatient consult to Pulmonology  Once        Provider:  Keen, Darian J., MD    Acknowledged SALLAM, KASSANDRA            No new Assessment & Plan notes have been filed under this hospital service since the last note was generated.  Service: Hospital Medicine    Final Active Diagnoses:    Diagnosis Date Noted POA    PRINCIPAL PROBLEM:  Interstitial lung disease exacerbation [J84.9] 02/28/2024 Yes    Chronic hypoxemic respiratory failure [J96.11] 09/23/2024 Yes    H/O: CVA (cerebrovascular accident) [Z86.73] 08/07/2024 Not Applicable    Chronic HFrEF (heart failure with reduced ejection fraction) [I50.22] 05/01/2024 Yes    Coronary artery disease involving native coronary artery of native heart without angina pectoris [I25.10] 02/28/2024 Yes    BPH (benign prostatic hyperplasia) [N40.0] 02/28/2024 Yes    Type 2 diabetes mellitus without complication, without long-term current use of insulin [E11.9] 10/08/2021 Yes    Hyperlipidemia [E78.5] 05/29/2020 Yes    Essential hypertension  [I10] 05/29/2020 Yes      Problems Resolved During this Admission:       Discharged Condition: good    Disposition: home    Follow Up:    Patient Instructions:      Ambulatory referral/consult to Pulmonology   Standing Status: Future   Referral Priority: Routine Referral Type: Consultation   Referral Reason: Specialty Services Required   Requested Specialty: Pulmonary Disease   Number of Visits Requested: 1       Significant Diagnostic Studies:   Recent Results (from the past 100 hours)   BASIC METABOLIC PANEL    Collection Time: 10/10/24 10:14 AM   Result Value Ref Range    Sodium 138 136 - 145 mmol/L    Potassium 3.5 3.5 - 5.1 mmol/L    Chloride 97 95 - 110 mmol/L    CO2 28 23 - 29 mmol/L    Glucose 154 (H) 70 - 110 mg/dL    BUN 18 8 - 23 mg/dL    Creatinine 1.2 0.5 - 1.4 mg/dL    Calcium 9.9 8.7 - 10.5 mg/dL    Anion Gap 13 8 - 16 mmol/L    eGFR >60 >60 mL/min/1.73 m^2   ISTAT PROCEDURE    Collection Time: 10/11/24  5:18 PM   Result Value Ref Range    POC PH 7.416 7.35 - 7.45    POC PCO2 49.9 (H) 35 - 45 mmHg    POC PO2 28 (LL) 40 - 60 mmHg    POC HCO3 32.1 (H) 24 - 28 mmol/L    POC BE 6 (H) -2 to 2 mmol/L    POC SATURATED O2 53 95 - 100 %    POC TCO2 34 (H) 24 - 29 mmol/L    Sample VENOUS     Site Other     Allens Test N/A    Magnesium    Collection Time: 10/11/24  7:41 PM   Result Value Ref Range    Magnesium 1.5 (L) 1.6 - 2.6 mg/dL   Troponin I    Collection Time: 10/11/24  7:41 PM   Result Value Ref Range    Troponin I 0.006 0.000 - 0.026 ng/mL   Brain natriuretic peptide    Collection Time: 10/11/24  7:41 PM   Result Value Ref Range    BNP 67 0 - 99 pg/mL   Comprehensive metabolic panel    Collection Time: 10/11/24  7:41 PM   Result Value Ref Range    Sodium 138 136 - 145 mmol/L    Potassium 3.2 (L) 3.5 - 5.1 mmol/L    Chloride 101 95 - 110 mmol/L    CO2 27 23 - 29 mmol/L    Glucose 145 (H) 70 - 110 mg/dL    BUN 14 8 - 23 mg/dL    Creatinine 1.0 0.5 - 1.4 mg/dL    Calcium 8.6 (L) 8.7 - 10.5 mg/dL    Total  Protein 6.0 6.0 - 8.4 g/dL    Albumin 3.1 (L) 3.5 - 5.2 g/dL    Total Bilirubin 0.5 0.1 - 1.0 mg/dL    Alkaline Phosphatase 44 (L) 55 - 135 U/L    AST 20 10 - 40 U/L    ALT 27 10 - 44 U/L    eGFR >60 >60 mL/min/1.73 m^2    Anion Gap 10 8 - 16 mmol/L   CBC auto differential    Collection Time: 10/11/24  7:41 PM   Result Value Ref Range    WBC 9.37 3.90 - 12.70 K/uL    RBC 4.84 4.60 - 6.20 M/uL    Hemoglobin 13.8 (L) 14.0 - 18.0 g/dL    Hematocrit 40.6 40.0 - 54.0 %    MCV 84 82 - 98 fL    MCH 28.5 27.0 - 31.0 pg    MCHC 34.0 32.0 - 36.0 g/dL    RDW 14.3 11.5 - 14.5 %    Platelets 128 (L) 150 - 450 K/uL    MPV 9.5 9.2 - 12.9 fL    Immature Granulocytes 0.4 0.0 - 0.5 %    Gran # (ANC) 6.1 1.8 - 7.7 K/uL    Immature Grans (Abs) 0.04 0.00 - 0.04 K/uL    Lymph # 1.9 1.0 - 4.8 K/uL    Mono # 0.9 0.3 - 1.0 K/uL    Eos # 0.5 0.0 - 0.5 K/uL    Baso # 0.01 0.00 - 0.20 K/uL    nRBC 0 0 /100 WBC    Gran % 64.9 38.0 - 73.0 %    Lymph % 20.0 18.0 - 48.0 %    Mono % 9.8 4.0 - 15.0 %    Eosinophil % 4.8 0.0 - 8.0 %    Basophil % 0.1 0.0 - 1.9 %    Differential Method Automated    POCT COVID-19 Rapid Screening    Collection Time: 10/11/24  8:21 PM   Result Value Ref Range    POC Rapid COVID Negative Negative     Acceptable Yes    POCT Influenza A/B Molecular    Collection Time: 10/11/24  8:21 PM   Result Value Ref Range    POC Molecular Influenza A Ag Negative Negative    POC Molecular Influenza B Ag Negative Negative     Acceptable Yes    POCT glucose    Collection Time: 10/12/24  1:41 AM   Result Value Ref Range    POCT Glucose 279 (H) 70 - 110 mg/dL   Phosphorus    Collection Time: 10/12/24  4:11 AM   Result Value Ref Range    Phosphorus 2.4 (L) 2.7 - 4.5 mg/dL   Magnesium    Collection Time: 10/12/24  4:11 AM   Result Value Ref Range    Magnesium 2.1 1.6 - 2.6 mg/dL   Comprehensive Metabolic Panel    Collection Time: 10/12/24  4:11 AM   Result Value Ref Range    Sodium 137 136 - 145 mmol/L     Potassium 4.5 3.5 - 5.1 mmol/L    Chloride 102 95 - 110 mmol/L    CO2 27 23 - 29 mmol/L    Glucose 276 (H) 70 - 110 mg/dL    BUN 16 8 - 23 mg/dL    Creatinine 1.0 0.5 - 1.4 mg/dL    Calcium 8.9 8.7 - 10.5 mg/dL    Total Protein 6.1 6.0 - 8.4 g/dL    Albumin 3.1 (L) 3.5 - 5.2 g/dL    Total Bilirubin 0.6 0.1 - 1.0 mg/dL    Alkaline Phosphatase 44 (L) 55 - 135 U/L    AST 18 10 - 40 U/L    ALT 26 10 - 44 U/L    eGFR >60 >60 mL/min/1.73 m^2    Anion Gap 8 8 - 16 mmol/L   CBC Auto Differential    Collection Time: 10/12/24  4:11 AM   Result Value Ref Range    WBC 7.09 3.90 - 12.70 K/uL    RBC 4.98 4.60 - 6.20 M/uL    Hemoglobin 13.9 (L) 14.0 - 18.0 g/dL    Hematocrit 42.1 40.0 - 54.0 %    MCV 85 82 - 98 fL    MCH 27.9 27.0 - 31.0 pg    MCHC 33.0 32.0 - 36.0 g/dL    RDW 14.4 11.5 - 14.5 %    Platelets 139 (L) 150 - 450 K/uL    MPV 10.8 9.2 - 12.9 fL    Immature Granulocytes 0.6 (H) 0.0 - 0.5 %    Gran # (ANC) 5.7 1.8 - 7.7 K/uL    Immature Grans (Abs) 0.04 0.00 - 0.04 K/uL    Lymph # 1.0 1.0 - 4.8 K/uL    Mono # 0.3 0.3 - 1.0 K/uL    Eos # 0.1 0.0 - 0.5 K/uL    Baso # 0.01 0.00 - 0.20 K/uL    nRBC 0 0 /100 WBC    Gran % 80.9 (H) 38.0 - 73.0 %    Lymph % 13.7 (L) 18.0 - 48.0 %    Mono % 3.7 (L) 4.0 - 15.0 %    Eosinophil % 1.0 0.0 - 8.0 %    Basophil % 0.1 0.0 - 1.9 %    Differential Method Automated    POCT glucose    Collection Time: 10/12/24  8:12 AM   Result Value Ref Range    POCT Glucose 304 (H) 70 - 110 mg/dL       Microbiology Results (last 7 days)       ** No results found for the last 168 hours. **            Imaging Results              X-Ray Chest PA And Lateral (Final result)  Result time 10/11/24 17:41:41      Final result by Eldiia Dent MD (10/11/24 17:41:41)                   Impression:      No acute intrathoracic abnormality.  Chronic interstitial lung disease or pulmonary fibrosis.      Electronically signed by: Elidia Dent  Date:    10/11/2024  Time:    17:41               Narrative:     EXAMINATION:  CHEST PA AND LATERAL    CLINICAL HISTORY:  Shortness of breath    TECHNIQUE:  PA and lateral chest radiograph    COMPARISON:  09/25/2024    FINDINGS:  The cardiac silhouette is within normal limits.   There is no focal consolidation, pneumothorax, or pleural effusion.  There are chronic interstitial lung findings are pulmonary fibrosis.  There are no superimposed new areas of focal consolidation.  There is no pneumothorax or significant pleural effusion.  There is mild asymmetric elevation of the right hemidiaphragm.                                          Pending Diagnostic Studies:       Procedure Component Value Units Date/Time    Mycophenolic Acid [[id number]] Collected: 10/11/24 1941    Order Status: Sent Lab Status: In process Updated: 10/1952    Specimen: Blood            Medications:  Reconciled Home Medications:      Medication List        START taking these medications      azithromycin 250 MG tablet  Commonly known as: Z-ARA  Take 2 tablets by mouth on day 1; Take 1 tablet by mouth on days 2-5     methylPREDNISolone 4 mg tablet  Commonly known as: MEDROL DOSEPACK  use as directed            CONTINUE taking these medications      albuterol-ipratropium 2.5 mg-0.5 mg/3 mL nebulizer solution  Commonly known as: DUO-NEB  Take 3 mLs by nebulization every 6 (six) hours as needed for Wheezing. Rescue     amLODIPine 10 MG tablet  Commonly known as: NORVASC  Take 1 tablet (10 mg total) by mouth once daily.     aspirin 81 MG Chew  Take 81 mg by mouth once daily.     atorvastatin 40 MG tablet  Commonly known as: LIPITOR  Take 1 tablet (40 mg total) by mouth every evening.     BEANO ORAL  Take 1 tablet by mouth 2 (two) times a day.     cholecalciferol (vitamin D3) 50 mcg (2,000 unit) Cap capsule  Commonly known as: VITAMIN D3  1 capsule.     ENTRESTO 24-26 mg per tablet  Generic drug: sacubitriL-valsartan  Take 1 tablet by mouth 2 (two) times daily.     furosemide 20 MG tablet  Commonly known as:  LASIX  Take 1 tablet (20 mg total) by mouth once daily.     glimepiride 2 MG tablet  Commonly known as: AMARYL  Take 1 tablet (2 mg total) by mouth 2 (two) times a day. Take with meals     metFORMIN 1000 MG tablet  Commonly known as: GLUCOPHAGE  Take 1 tablet (1,000 mg total) by mouth 2 (two) times daily with meals.     metoprolol succinate 50 MG 24 hr tablet  Commonly known as: TOPROL-XL  Take 1 tablet (50 mg total) by mouth once daily.     mirabegron 25 mg Tb24 ER tablet  Commonly known as: MYRBETRIQ  Take 1 tablet (25 mg total) by mouth once daily.     mycophenolate 250 mg Cap  Commonly known as: CELLCEPT  Take 2 capsules (500 mg total) by mouth 2 (two) times daily.     sucralfate 1 gram tablet  Commonly known as: CARAFATE  Take 1 g by mouth 4 (four) times daily.     tamsulosin 0.4 mg Cap  Commonly known as: FLOMAX  Take 1 capsule (0.4 mg total) by mouth once daily.              Indwelling Lines/Drains at time of discharge:   Lines/Drains/Airways       None                   Time spent on the discharge of patient: 35 minutes         Kristian Han MD  Department of Hospital Medicine  [facility] [place] [facility]

## 2024-10-12 NOTE — ASSESSMENT & PLAN NOTE
Results for orders placed during the hospital encounter of 07/02/24    Echo    Interpretation Summary    Left Ventricle: The left ventricle is normal in size. Normal wall thickness. Regional wall motion abnormalities and Global hypokinesis present. See diagram for wall motion findings. There is mildly reduced systolic function with a visually estimated ejection fraction of 40 - 45%. Biplane (2D) method of discs ejection fraction is 43%. Global longitudinal strain is -13.0%.    Right Ventricle: Normal right ventricular cavity size. Wall thickness is normal. Systolic function is normal.    Aortic Valve: The aortic valve is a trileaflet valve. There is mild aortic valve sclerosis.    Mitral Valve: There is mild bileaflet sclerosis. There is mild mitral annular calcification present.    Pulmonic Valve: There is mild regurgitation.    Pulmonary Artery: The estimated pulmonary artery systolic pressure is 24 mmHg.    IVC/SVC: Normal venous pressure at 3 mmHg.  BNP  Recent Labs   Lab 10/11/24  1941   BNP 67     No acute issues  Continue home medications

## 2024-10-12 NOTE — CONSULTS
[facility]  Pulmonology  Consult Note    Patient Name: Emmanuel Grant  MRN: [id number]  Admission Date: 10/11/2024  Hospital Length of Stay: 1 days  Code Status: Full Code  Attending Physician: Kristian Han MD  Primary Care Provider: Wilfredo De Souza MD   Principal Problem: Interstitial lung disease    Inpatient consult to Pulmonology  Consult performed by: Darian Pérez MD  Consult ordered by: Noe Rey MD        Subjective:     HPI:  Emmanuel Grant is a 73 year old male with history of ILD (on home O2), CAD, HFrEF (EF 40% with grade I DD), HTN, DMII, HLD, CVA, BPH, who presents with acute worsening of his chronic SOB.  He is having cough with white sputum production that is causing post-tussive emesis. Denies sick contacts or tobacco abuse.    In the ER he was placed on 2LPM via NC with adequate saturations.  BNP was 67, troponin was WNL, VBG showed 7.41/49.9.  Chest x-ray showed chronic findings consistent with ILD.  Electrolytes were repleted and he was started on duonebs, and prednisone taper.  Pulm consulted for further input.    He normally follows with Dr. Naheed Schmitt.  He has responded favorably to steroid tapers in the past, but AI workup has been largely negative.  Recently started on MMF.  There are thoughts that severe GERD is contributing to his ILD and he is establishing care with GI to address this issue.    Childhood Illnesses:  none  Occupational:   works in air conditioning in installation and repair  Environmental:   no pets, no seasonal allergies, no carpet in his home and no concern for mold or allergy exposures there  Tobacco/Smoking:   never smoker    TTE with Grade I DD and EF if 40-45%    Past Medical History:   Diagnosis Date    CAD (coronary artery disease)     Sees [facility], h/o stent    Diabetes mellitus     Hypertension     Kidney stone     Stroke     age 62       Past Surgical History:   Procedure Laterality Date    CARDIAC CATHETERIZATION      with stent    COLONOSCOPY N/A  "03/27/2024    Procedure: COLONOSCOPY;  Surgeon: Ariela Castro MD;  Location: [facility] ENDO;  Service: Endoscopy;  Laterality: N/A;    ESOPHAGOGASTRODUODENOSCOPY N/A 03/27/2024    Procedure: EGD (ESOPHAGOGASTRODUODENOSCOPY);  Surgeon: Ariela Castro MD;  Location: [facility] ENDO;  Service: Endoscopy;  Laterality: N/A;    LEFT HEART CATHETERIZATION Left 9/10/2024    Procedure: Left heart cath;  Surgeon: Jemal Drummond MD;  Location: [facility] CATH LAB;  Service: Cardiology;  Laterality: Left;    SHOULDER SURGERY Right        Review of patient's allergies indicates:   Allergen Reactions    Dapagliflozin      Other reaction(s): Other (See Comments)    Pcn [penicillins]     Linagliptin Other (See Comments)     "it knocked me down", "it almost killed me"    Lisinopril Other (See Comments)     cough    Pantoprazole Hives       Family History       Problem Relation (Age of Onset)    Cancer Mother    Colon cancer Sister          Tobacco Use    Smoking status: Never     Passive exposure: Never    Smokeless tobacco: Never   Substance and Sexual Activity    Alcohol use: No    Drug use: Never    Sexual activity: Not Currently         Review of Systems   Constitutional:  Positive for activity change and fatigue.   HENT:  Negative for congestion.    Respiratory:  Positive for cough and shortness of breath.    Cardiovascular:  Negative for chest pain and leg swelling.   Neurological: Negative.    Psychiatric/Behavioral: Negative.       Objective:     Vital Signs (Most Recent):  Temp: 98.3 °F (36.8 °C) (10/12/24 0811)  Pulse: 68 (10/12/24 0858)  Resp: 17 (10/12/24 0858)  BP: 123/81 (10/12/24 0811)  SpO2: 97 % (10/12/24 0858) Vital Signs (24h Range):  Temp:  [98 °F (36.7 °C)-98.4 °F (36.9 °C)] 98.3 °F (36.8 °C)  Pulse:  [68-95] 68  Resp:  [17-24] 17  SpO2:  [95 %-100 %] 97 %  BP: (116-146)/(73-92) 123/81     Weight: 62.1 kg (136 lb 14.5 oz)  Body mass index is 20.82 kg/m².      Intake/Output Summary (Last 24 hours) at " 10/12/2024 1119  Last data filed at 10/12/2024 0811  Gross per 24 hour   Intake 290 ml   Output 300 ml   Net -10 ml        Physical Exam  Constitutional:       General: He is not in acute distress.     Appearance: Normal appearance. He is normal weight. He is not ill-appearing, toxic-appearing or diaphoretic.   HENT:      Head: Normocephalic and atraumatic.      Nose: Nose normal.      Mouth/Throat:      Mouth: Mucous membranes are moist.   Eyes:      Extraocular Movements: Extraocular movements intact.      Pupils: Pupils are equal, round, and reactive to light.   Cardiovascular:      Rate and Rhythm: Normal rate and regular rhythm.      Heart sounds: No murmur heard.     No friction rub. No gallop.   Pulmonary:      Effort: Pulmonary effort is normal.      Breath sounds: Rales present.   Abdominal:      General: Abdomen is flat. There is no distension.      Palpations: Abdomen is soft.      Tenderness: There is no abdominal tenderness.   Musculoskeletal:         General: Normal range of motion.      Cervical back: Normal range of motion.      Right lower leg: No edema.      Left lower leg: No edema.   Skin:     General: Skin is warm.   Neurological:      General: No focal deficit present.      Mental Status: He is alert and oriented to person, place, and time. Mental status is at baseline.   Psychiatric:         Mood and Affect: Mood normal.         Behavior: Behavior normal.         Thought Content: Thought content normal.         Judgment: Judgment normal.          Vents:       Lines/Drains/Airways       Peripheral Intravenous Line  Duration                  Peripheral IV - Single Lumen 10/11/24 1443 20 G Anterior;Left Forearm <1 day                    Significant Labs:    CBC/Anemia Profile:  Recent Labs   Lab 10/11/24  1941 10/12/24  0411   WBC 9.37 7.09   HGB 13.8* 13.9*   HCT 40.6 42.1   * 139*   MCV 84 85   RDW 14.3 14.4        Chemistries:  Recent Labs   Lab 10/11/24  1941 10/12/24  0411     137   K 3.2* 4.5    102   CO2 27 27   BUN 14 16   CREATININE 1.0 1.0   CALCIUM 8.6* 8.9   ALBUMIN 3.1* 3.1*   PROT 6.0 6.1   BILITOT 0.5 0.6   ALKPHOS 44* 44*   ALT 27 26   AST 20 18   MG 1.5* 2.1   PHOS  --  2.4*       All pertinent labs within the past 24 hours have been reviewed.    Significant Imaging:   I have reviewed all pertinent imaging results/findings within the past 24 hours.    ABG  Recent Labs   Lab 10/11/24  1718   PH 7.416   PO2 28*   PCO2 49.9*   HCO3 32.1*   BE 6*     Assessment/Plan:     Pulmonary  * Interstitial lung disease exacerbation  Not sure that this constitutes a true ILD exacerabtion, but his shortness of breath is worse than at baseline.  He is responding to current therapy and I recommend to continue.  - duonebs scheduled q6hr  - prednisone 20mg x 1 week, then 10mg x 1 week, then 5mg x 1 week, then stop.  - continue MMF 100mg BID  - if symptoms fail to improve, then obtain CT thorax without contrast.  - if symptoms persist, would recommend diuresis trial with 40mg IV lasix x 1    Chronic hypoxemic respiratory failure  Target sats 88-96% with supplemental O2      Thank you for your consult. I will follow-up with patient while in house.  Has follow up with primary pulmonologist later this month.     Darian Pérze MD  Pulmonology  [facility]

## 2024-10-12 NOTE — H&P
[facility]  History & Physical    Patient Name: Emmanuel Grant  MRN: [id number]  Patient Class: IP- Inpatient  Admission Date: 10/11/2024  Attending Physician: Kristian Han MD   Primary Care Provider: Wilfredo De Souza MD         Patient information was obtained from patient and ER records.     Subjective:     Principal Problem:Interstitial lung disease    Chief Complaint:   Chief Complaint   Patient presents with    Shortness of Breath     Pt BIB EMS, c/o SOB. Pt has chronic SOB but worsened a few hours ago. Pt denies any CP, abd pain or n/v/d        HPI: This is a 73-year-old male with a past medical history of ILD (on O2), CAD, HFrEF (EF: 40%), hypertension, type 2 diabetes, hyperlipidemia, CVA, BPH who presents with shortness of breath.     Patient presents for evaluation of shortness of breath that worsened on the day of presentation.  He endorses progressively worsening dyspnea, associated with a productive cough with white sputum.  Associated symptoms include posttussive emesis.  He reports compliance with his medications.  He denies exposure to smoke/sick contacts.    In the ED, the patient was tachypneic (20s), and was placed on 2 L O2.  Labs were remarkable for hypokalemia (3.2), hypomagnesemia (1.5), negative BNP (67), negative troponin (0.006).  VBG showed a pH of 7.41, pCO2 of 49.9.  Chest x-ray showed chronic interstitial lung disease or pulmonary fibrosis.  Pulmonary recommended a steroid taper.  Patient was given potassium bicarbonate 40 mEq, magnesium sulfate 2 g IV, DuoNeb x1, prednisone 20 mg p.o..  He was admitted for further management.    Past Medical History:   Diagnosis Date    CAD (coronary artery disease)     Sees [facility], h/o stent    Diabetes mellitus     Hypertension     Kidney stone     Stroke     age 62       Past Surgical History:   Procedure Laterality Date    CARDIAC CATHETERIZATION      with stent    COLONOSCOPY N/A 03/27/2024    Procedure:  "COLONOSCOPY;  Surgeon: Ariela Castro MD;  Location: [facility] ENDO;  Service: Endoscopy;  Laterality: N/A;    ESOPHAGOGASTRODUODENOSCOPY N/A 03/27/2024    Procedure: EGD (ESOPHAGOGASTRODUODENOSCOPY);  Surgeon: Ariela Castro MD;  Location: [facility] [place] ENDO;  Service: Endoscopy;  Laterality: N/A;    LEFT HEART CATHETERIZATION Left 9/10/2024    Procedure: Left heart cath;  Surgeon: Jemal Drummond MD;  Location: [facility] [place] CATH LAB;  Service: Cardiology;  Laterality: Left;    SHOULDER SURGERY Right        Review of patient's allergies indicates:   Allergen Reactions    Dapagliflozin      Other reaction(s): Other (See Comments)    Pcn [penicillins]     Linagliptin Other (See Comments)     "it knocked me down", "it almost killed me"    Lisinopril Other (See Comments)     cough    Pantoprazole Hives       No current facility-administered medications on file prior to encounter.     Current Outpatient Medications on File Prior to Encounter   Medication Sig    albuterol-ipratropium (DUO-NEB) 2.5 mg-0.5 mg/3 mL nebulizer solution Take 3 mLs by nebulization every 6 (six) hours as needed for Wheezing. Rescue    alpha-D-galactosidase (BEANO ORAL) Take 1 tablet by mouth 2 (two) times a day.    amLODIPine (NORVASC) 10 MG tablet Take 1 tablet (10 mg total) by mouth once daily.    aspirin 81 MG Chew Take 81 mg by mouth once daily.    atorvastatin (LIPITOR) 40 MG tablet Take 1 tablet (40 mg total) by mouth every evening.    cholecalciferol, vitamin D3, (VITAMIN D3) 50 mcg (2,000 unit) Cap capsule 1 capsule.    furosemide (LASIX) 20 MG tablet Take 1 tablet (20 mg total) by mouth once daily.    glimepiride (AMARYL) 2 MG tablet Take 1 tablet (2 mg total) by mouth 2 (two) times a day. Take with meals    metFORMIN (GLUCOPHAGE) 1000 MG tablet Take 1 tablet (1,000 mg total) by mouth 2 (two) times daily with meals.    metoprolol succinate (TOPROL-XL) 50 MG 24 hr tablet Take 1 tablet (50 mg total) by mouth once daily.    mirabegron " (MYRBETRIQ) 25 mg Tb24 ER tablet Take 1 tablet (25 mg total) by mouth once daily.    mycophenolate (CELLCEPT) 250 mg Cap Take 2 capsules (500 mg total) by mouth 2 (two) times daily.    sacubitriL-valsartan (ENTRESTO) 24-26 mg per tablet Take 1 tablet by mouth 2 (two) times daily.    sucralfate (CARAFATE) 1 gram tablet Take 1 g by mouth 4 (four) times daily.    tamsulosin (FLOMAX) 0.4 mg Cap Take 1 capsule (0.4 mg total) by mouth once daily.     Family History       Problem Relation (Age of Onset)    Cancer Mother    Colon cancer Sister          Tobacco Use    Smoking status: Never     Passive exposure: Never    Smokeless tobacco: Never   Substance and Sexual Activity    Alcohol use: No    Drug use: Never    Sexual activity: Not Currently     Review of Systems   Respiratory:  Positive for cough and shortness of breath.    Cardiovascular: Negative.    Gastrointestinal: Negative.    Genitourinary: Negative.    Musculoskeletal: Negative.    Neurological: Negative.      Objective:     Vital Signs (Most Recent):  Temp: 98 °F (36.7 °C) (10/11/24 1441)  Pulse: 88 (10/11/24 2108)  Resp: 20 (10/11/24 2104)  BP: (!) 142/85 (10/11/24 2104)  SpO2: 99 % (10/11/24 2108) Vital Signs (24h Range):  Temp:  [98 °F (36.7 °C)] 98 °F (36.7 °C)  Pulse:  [88-95] 88  Resp:  [18-24] 20  SpO2:  [95 %-100 %] 99 %  BP: (116-146)/(73-92) 142/85     Weight: 63.5 kg (140 lb)  Body mass index is 21.29 kg/m².     Physical Exam  Vitals and nursing note reviewed.   Constitutional:       General: He is not in acute distress.     Appearance: He is not ill-appearing.   HENT:      Mouth/Throat:      Mouth: Mucous membranes are moist.   Cardiovascular:      Rate and Rhythm: Normal rate.   Pulmonary:      Effort: Pulmonary effort is normal.      Breath sounds: Rhonchi present.   Abdominal:      General: Abdomen is flat.   Skin:     General: Skin is warm.   Neurological:      General: No focal deficit present.      Mental Status: He is alert.                 Significant Labs: All pertinent labs within the past 24 hours have been reviewed.    Significant Imaging: I have reviewed all pertinent imaging results/findings within the past 24 hours.  Assessment/Plan:     * Interstitial lung disease exacerbation  Presents with shortness of breath  Chest x-ray showed chronic interstitial lung disease or pulmonary fibrosis.    Possible ILD flare     Plan:   - Prednisone 20 mg daily   - DuoNebs   - Pulmonary consult     Chronic hypoxemic respiratory failure  Continue 2L O2    H/O: CVA (cerebrovascular accident)  Continue home meds      Chronic HFrEF (heart failure with reduced ejection fraction)  Results for orders placed during the hospital encounter of 07/02/24    Echo    Interpretation Summary    Left Ventricle: The left ventricle is normal in size. Normal wall thickness. Regional wall motion abnormalities and Global hypokinesis present. See diagram for wall motion findings. There is mildly reduced systolic function with a visually estimated ejection fraction of 40 - 45%. Biplane (2D) method of discs ejection fraction is 43%. Global longitudinal strain is -13.0%.    Right Ventricle: Normal right ventricular cavity size. Wall thickness is normal. Systolic function is normal.    Aortic Valve: The aortic valve is a trileaflet valve. There is mild aortic valve sclerosis.    Mitral Valve: There is mild bileaflet sclerosis. There is mild mitral annular calcification present.    Pulmonic Valve: There is mild regurgitation.    Pulmonary Artery: The estimated pulmonary artery systolic pressure is 24 mmHg.    IVC/SVC: Normal venous pressure at 3 mmHg.  BNP  Recent Labs   Lab 10/11/24  1941   BNP 67     No acute issues  Continue home medications       Hyperlipidemia  Continue statin    Essential hypertension  Patients blood pressure range in the last 24 hours was: BP  Min: 105/73  Max: 146/92.The patient's inpatient anti-hypertensive regimen is listed below:  Current  "Antihypertensives  amLODIPine tablet 10 mg, Daily, Oral  furosemide tablet 20 mg, Daily, Oral  metoprolol succinate (TOPROL-XL) 24 hr tablet 50 mg, Daily, Oral    Plan  - BP is controlled, no changes needed to their regimen    BPH (benign prostatic hyperplasia)  Continue Flomax       Coronary artery disease involving native coronary artery of native heart without angina pectoris  Patient with known non obstructive CAD which is controlled Will continue ASA and Statin and monitor for S/Sx of angina/ACS. Continue to monitor on telemetry.     Type 2 diabetes mellitus without complication, without long-term current use of insulin  Patient's FSGs are controlled on current medication regimen.  Last A1c reviewed-   Lab Results   Component Value Date    HGBA1C 8.0 (H) 09/25/2024     Most recent fingerstick glucose reviewed- No results for input(s): "POCTGLUCOSE" in the last 24 hours.  Current correctional scale  Medium  Maintain anti-hyperglycemic dose as follows-   Antihyperglycemics (From admission, onward)      Start     Stop Route Frequency Ordered    10/11/24 2337  insulin aspart U-100 pen 0-10 Units         -- SubQ Before meals & nightly PRN 10/11/24 2237          Hold Oral hypoglycemics while patient is in the hospital.      VTE Risk Mitigation (From admission, onward)           Ordered     enoxaparin injection 40 mg  Daily         10/11/24 2237     IP VTE HIGH RISK PATIENT  Once         10/11/24 2237     Place sequential compression device  Until discontinued         10/11/24 2237                         Noe Rey MD  Department of Hospital Medicine  [facility] [place] - Emergency Dept          "

## 2024-10-12 NOTE — ASSESSMENT & PLAN NOTE
"Patient's FSGs are controlled on current medication regimen.  Last A1c reviewed-   Lab Results   Component Value Date    HGBA1C 8.0 (H) 09/25/2024     Most recent fingerstick glucose reviewed- No results for input(s): "POCTGLUCOSE" in the last 24 hours.  Current correctional scale  Medium  Maintain anti-hyperglycemic dose as follows-   Antihyperglycemics (From admission, onward)      Start     Stop Route Frequency Ordered    10/11/24 2337  insulin aspart U-100 pen 0-10 Units         -- SubQ Before meals & nightly PRN 10/11/24 2237          Hold Oral hypoglycemics while patient is in the hospital.  "

## 2024-10-12 NOTE — PROGRESS NOTES
[facility] Medicine  Progress Note    Patient Name: Emmanuel Grant  MRN: [id number]  Patient Class: IP- Inpatient   Admission Date: 10/11/2024  Length of Stay: 1 days  Attending Physician: Kristian Han MD  Primary Care Provider: Wilfredo De Souza MD        Subjective:     Principal Problem:Interstitial lung disease        HPI:  This is a 73-year-old male with a past medical history of ILD (on O2), CAD, HFrEF (EF: 40%), hypertension, type 2 diabetes, hyperlipidemia, CVA, BPH who presents with shortness of breath.     Patient presents for evaluation of shortness of breath that worsened on the day of presentation.  He endorses progressively worsening dyspnea, associated with a productive cough with white sputum.  Associated symptoms include posttussive emesis.  He reports compliance with his medications.  He denies exposure to smoke/sick contacts.    In the ED, the patient was tachypneic (20s), and was placed on 2 L O2.  Labs were remarkable for hypokalemia (3.2), hypomagnesemia (1.5), negative BNP (67), negative troponin (0.006).  VBG showed a pH of 7.41, pCO2 of 49.9.  Chest x-ray showed chronic interstitial lung disease or pulmonary fibrosis.  Pulmonary recommended a steroid taper.  Patient was given potassium bicarbonate 40 mEq, magnesium sulfate 2 g IV, DuoNeb x1, prednisone 20 mg p.o..  He was admitted for further management.    Overview/Hospital Course:  Came in w worsening SOB, steroids and breathing tx given, improving.    Interval History:  NAEON.  No new issues.   CC- SOBOE  All questions answered and updates on care given.       ROS:  General: Negative for fevers   Cardiac: Negative for chest pain   Pulmonary: Negative for wheezing  GI: Negative for abdominal distention      Vitals:    10/12/24 0033 10/12/24 0530 10/12/24 0811 10/12/24 0858   BP:  125/75 123/81    BP Location:  Left arm     Pulse:  85 83 68   Resp:  18 17 17   Temp:  98.4 °F (36.9 °C) 98.3 °F (36.8 °C)    TempSrc:  Oral  "Oral    SpO2:  97% 95% 97%   Weight: 62.1 kg (136 lb 14.5 oz)      Height: 5' 8" (1.727 m)             Body mass index is 20.82 kg/m².      PHYSICAL EXAM:  GENERAL APPEARANCE: alert and cooperative.     HEAD: NC/AT  CARDIAC: There is no cyanosis or pallor.   LUNGS: No apparent wheezing or stridor. Rales/crackles b/l  ABDOMEN: Non-distended. No guarding.  MSK: No joint erythema or tenderness.   EXTREMITIES: No significant new deformity or new joint abnormality.   NEUROLOGICAL: CN II-XII grossly intact.   SKIN: No lesions or eruptions.  PSYCHIATRIC: No tangential speech. No Hyperactive features.        Recent Results (from the past 24 hours)   ISTAT PROCEDURE    Collection Time: 10/11/24  5:18 PM   Result Value Ref Range    POC PH 7.416 7.35 - 7.45    POC PCO2 49.9 (H) 35 - 45 mmHg    POC PO2 28 (LL) 40 - 60 mmHg    POC HCO3 32.1 (H) 24 - 28 mmol/L    POC BE 6 (H) -2 to 2 mmol/L    POC SATURATED O2 53 95 - 100 %    POC TCO2 34 (H) 24 - 29 mmol/L    Sample VENOUS     Site Other     Allens Test N/A    Magnesium    Collection Time: 10/11/24  7:41 PM   Result Value Ref Range    Magnesium 1.5 (L) 1.6 - 2.6 mg/dL   Troponin I    Collection Time: 10/11/24  7:41 PM   Result Value Ref Range    Troponin I 0.006 0.000 - 0.026 ng/mL   Brain natriuretic peptide    Collection Time: 10/11/24  7:41 PM   Result Value Ref Range    BNP 67 0 - 99 pg/mL   Comprehensive metabolic panel    Collection Time: 10/11/24  7:41 PM   Result Value Ref Range    Sodium 138 136 - 145 mmol/L    Potassium 3.2 (L) 3.5 - 5.1 mmol/L    Chloride 101 95 - 110 mmol/L    CO2 27 23 - 29 mmol/L    Glucose 145 (H) 70 - 110 mg/dL    BUN 14 8 - 23 mg/dL    Creatinine 1.0 0.5 - 1.4 mg/dL    Calcium 8.6 (L) 8.7 - 10.5 mg/dL    Total Protein 6.0 6.0 - 8.4 g/dL    Albumin 3.1 (L) 3.5 - 5.2 g/dL    Total Bilirubin 0.5 0.1 - 1.0 mg/dL    Alkaline Phosphatase 44 (L) 55 - 135 U/L    AST 20 10 - 40 U/L    ALT 27 10 - 44 U/L    eGFR >60 >60 mL/min/1.73 m^2    Anion Gap 10 8 - " 16 mmol/L   CBC auto differential    Collection Time: 10/11/24  7:41 PM   Result Value Ref Range    WBC 9.37 3.90 - 12.70 K/uL    RBC 4.84 4.60 - 6.20 M/uL    Hemoglobin 13.8 (L) 14.0 - 18.0 g/dL    Hematocrit 40.6 40.0 - 54.0 %    MCV 84 82 - 98 fL    MCH 28.5 27.0 - 31.0 pg    MCHC 34.0 32.0 - 36.0 g/dL    RDW 14.3 11.5 - 14.5 %    Platelets 128 (L) 150 - 450 K/uL    MPV 9.5 9.2 - 12.9 fL    Immature Granulocytes 0.4 0.0 - 0.5 %    Gran # (ANC) 6.1 1.8 - 7.7 K/uL    Immature Grans (Abs) 0.04 0.00 - 0.04 K/uL    Lymph # 1.9 1.0 - 4.8 K/uL    Mono # 0.9 0.3 - 1.0 K/uL    Eos # 0.5 0.0 - 0.5 K/uL    Baso # 0.01 0.00 - 0.20 K/uL    nRBC 0 0 /100 WBC    Gran % 64.9 38.0 - 73.0 %    Lymph % 20.0 18.0 - 48.0 %    Mono % 9.8 4.0 - 15.0 %    Eosinophil % 4.8 0.0 - 8.0 %    Basophil % 0.1 0.0 - 1.9 %    Differential Method Automated    POCT COVID-19 Rapid Screening    Collection Time: 10/11/24  8:21 PM   Result Value Ref Range    POC Rapid COVID Negative Negative     Acceptable Yes    POCT Influenza A/B Molecular    Collection Time: 10/11/24  8:21 PM   Result Value Ref Range    POC Molecular Influenza A Ag Negative Negative    POC Molecular Influenza B Ag Negative Negative     Acceptable Yes    POCT glucose    Collection Time: 10/12/24  1:41 AM   Result Value Ref Range    POCT Glucose 279 (H) 70 - 110 mg/dL   Phosphorus    Collection Time: 10/12/24  4:11 AM   Result Value Ref Range    Phosphorus 2.4 (L) 2.7 - 4.5 mg/dL   Magnesium    Collection Time: 10/12/24  4:11 AM   Result Value Ref Range    Magnesium 2.1 1.6 - 2.6 mg/dL   Comprehensive Metabolic Panel    Collection Time: 10/12/24  4:11 AM   Result Value Ref Range    Sodium 137 136 - 145 mmol/L    Potassium 4.5 3.5 - 5.1 mmol/L    Chloride 102 95 - 110 mmol/L    CO2 27 23 - 29 mmol/L    Glucose 276 (H) 70 - 110 mg/dL    BUN 16 8 - 23 mg/dL    Creatinine 1.0 0.5 - 1.4 mg/dL    Calcium 8.9 8.7 - 10.5 mg/dL    Total Protein 6.1 6.0 - 8.4 g/dL     Albumin 3.1 (L) 3.5 - 5.2 g/dL    Total Bilirubin 0.6 0.1 - 1.0 mg/dL    Alkaline Phosphatase 44 (L) 55 - 135 U/L    AST 18 10 - 40 U/L    ALT 26 10 - 44 U/L    eGFR >60 >60 mL/min/1.73 m^2    Anion Gap 8 8 - 16 mmol/L   CBC Auto Differential    Collection Time: 10/12/24  4:11 AM   Result Value Ref Range    WBC 7.09 3.90 - 12.70 K/uL    RBC 4.98 4.60 - 6.20 M/uL    Hemoglobin 13.9 (L) 14.0 - 18.0 g/dL    Hematocrit 42.1 40.0 - 54.0 %    MCV 85 82 - 98 fL    MCH 27.9 27.0 - 31.0 pg    MCHC 33.0 32.0 - 36.0 g/dL    RDW 14.4 11.5 - 14.5 %    Platelets 139 (L) 150 - 450 K/uL    MPV 10.8 9.2 - 12.9 fL    Immature Granulocytes 0.6 (H) 0.0 - 0.5 %    Gran # (ANC) 5.7 1.8 - 7.7 K/uL    Immature Grans (Abs) 0.04 0.00 - 0.04 K/uL    Lymph # 1.0 1.0 - 4.8 K/uL    Mono # 0.3 0.3 - 1.0 K/uL    Eos # 0.1 0.0 - 0.5 K/uL    Baso # 0.01 0.00 - 0.20 K/uL    nRBC 0 0 /100 WBC    Gran % 80.9 (H) 38.0 - 73.0 %    Lymph % 13.7 (L) 18.0 - 48.0 %    Mono % 3.7 (L) 4.0 - 15.0 %    Eosinophil % 1.0 0.0 - 8.0 %    Basophil % 0.1 0.0 - 1.9 %    Differential Method Automated    POCT glucose    Collection Time: 10/12/24  8:12 AM   Result Value Ref Range    POCT Glucose 304 (H) 70 - 110 mg/dL       Microbiology Results (last 7 days)       ** No results found for the last 168 hours. **             Imaging Results              X-Ray Chest PA And Lateral (Final result)  Result time 10/11/24 17:41:41      Final result by Elidia Dent MD (10/11/24 17:41:41)                   Impression:      No acute intrathoracic abnormality.  Chronic interstitial lung disease or pulmonary fibrosis.      Electronically signed by: Elidia Dent  Date:    10/11/2024  Time:    17:41               Narrative:    EXAMINATION:  CHEST PA AND LATERAL    CLINICAL HISTORY:  Shortness of breath    TECHNIQUE:  PA and lateral chest radiograph    COMPARISON:  09/25/2024    FINDINGS:  The cardiac silhouette is within normal limits.   There is no focal consolidation,  pneumothorax, or pleural effusion.  There are chronic interstitial lung findings are pulmonary fibrosis.  There are no superimposed new areas of focal consolidation.  There is no pneumothorax or significant pleural effusion.  There is mild asymmetric elevation of the right hemidiaphragm.                                             Assessment/Plan:      * Interstitial lung disease exacerbation  Presents with shortness of breath  Chest x-ray showed chronic interstitial lung disease or pulmonary fibrosis.    Possible ILD flare     Plan:   - Prednisone 20 mg daily   - DuoNebs   - Pulmonary consult     Chronic hypoxemic respiratory failure  Continue 2L O2    H/O: CVA (cerebrovascular accident)  Continue home meds      Chronic HFrEF (heart failure with reduced ejection fraction)  Results for orders placed during the hospital encounter of 07/02/24    Echo    Interpretation Summary    Left Ventricle: The left ventricle is normal in size. Normal wall thickness. Regional wall motion abnormalities and Global hypokinesis present. See diagram for wall motion findings. There is mildly reduced systolic function with a visually estimated ejection fraction of 40 - 45%. Biplane (2D) method of discs ejection fraction is 43%. Global longitudinal strain is -13.0%.    Right Ventricle: Normal right ventricular cavity size. Wall thickness is normal. Systolic function is normal.    Aortic Valve: The aortic valve is a trileaflet valve. There is mild aortic valve sclerosis.    Mitral Valve: There is mild bileaflet sclerosis. There is mild mitral annular calcification present.    Pulmonic Valve: There is mild regurgitation.    Pulmonary Artery: The estimated pulmonary artery systolic pressure is 24 mmHg.    IVC/SVC: Normal venous pressure at 3 mmHg.  BNP  Recent Labs   Lab 10/11/24  1941   BNP 67     No acute issues  Continue home medications       Hyperlipidemia  Continue statin    Essential hypertension  Patients blood pressure range in the  "last 24 hours was: BP  Min: 105/73  Max: 146/92.The patient's inpatient anti-hypertensive regimen is listed below:  Current Antihypertensives  amLODIPine tablet 10 mg, Daily, Oral  furosemide tablet 20 mg, Daily, Oral  metoprolol succinate (TOPROL-XL) 24 hr tablet 50 mg, Daily, Oral    Plan  - BP is controlled, no changes needed to their regimen    BPH (benign prostatic hyperplasia)  Continue Flomax       Coronary artery disease involving native coronary artery of native heart without angina pectoris  Patient with known non obstructive CAD which is controlled Will continue ASA and Statin and monitor for S/Sx of angina/ACS. Continue to monitor on telemetry.     Type 2 diabetes mellitus without complication, without long-term current use of insulin  Patient's FSGs are controlled on current medication regimen.  Last A1c reviewed-   Lab Results   Component Value Date    HGBA1C 8.0 (H) 09/25/2024     Most recent fingerstick glucose reviewed- No results for input(s): "POCTGLUCOSE" in the last 24 hours.  Current correctional scale  Medium  Maintain anti-hyperglycemic dose as follows-   Antihyperglycemics (From admission, onward)      Start     Stop Route Frequency Ordered    10/11/24 2337  insulin aspart U-100 pen 0-10 Units         -- SubQ Before meals & nightly PRN 10/11/24 2237          Hold Oral hypoglycemics while patient is in the hospital.      VTE Risk Mitigation (From admission, onward)           Ordered     enoxaparin injection 40 mg  Daily         10/11/24 2237     IP VTE HIGH RISK PATIENT  Once         10/11/24 2237     Place sequential compression device  Until discontinued         10/11/24 2237                    Discharge Planning   DERIC:      Code Status: Full Code   Is the patient medically ready for discharge?:     Reason for patient still in hospital (select all that apply): Patient trending condition and Treatment                     Kristian Han MD  Department of Hospital Medicine   [facility] " Surg

## 2024-10-12 NOTE — HPI
This is a 73-year-old male with a past medical history of ILD (on O2), CAD, HFrEF (EF: 40%), hypertension, type 2 diabetes, hyperlipidemia, CVA, BPH who presents with shortness of breath.     Patient presents for evaluation of shortness of breath that worsened on the day of presentation.  He endorses progressively worsening dyspnea, associated with a productive cough with white sputum.  Associated symptoms include posttussive emesis.  He reports compliance with his medications.  He denies exposure to smoke/sick contacts.    In the ED, the patient was tachypneic (20s), and was placed on 2 L O2.  Labs were remarkable for hypokalemia (3.2), hypomagnesemia (1.5), negative BNP (67), negative troponin (0.006).  VBG showed a pH of 7.41, pCO2 of 49.9.  Chest x-ray showed chronic interstitial lung disease or pulmonary fibrosis.  Pulmonary recommended a steroid taper.  Patient was given potassium bicarbonate 40 mEq, magnesium sulfate 2 g IV, DuoNeb x1, prednisone 20 mg p.o..  He was admitted for further management.

## 2024-10-12 NOTE — SUBJECTIVE & OBJECTIVE
"Past Medical History:   Diagnosis Date    CAD (coronary artery disease)     Sees [facility], h/o stent    Diabetes mellitus     Hypertension     Kidney stone     Stroke     age 62       Past Surgical History:   Procedure Laterality Date    CARDIAC CATHETERIZATION      with stent    COLONOSCOPY N/A 03/27/2024    Procedure: COLONOSCOPY;  Surgeon: Ariela Castro MD;  Location: [facility] ENDO;  Service: Endoscopy;  Laterality: N/A;    ESOPHAGOGASTRODUODENOSCOPY N/A 03/27/2024    Procedure: EGD (ESOPHAGOGASTRODUODENOSCOPY);  Surgeon: Ariela Castro MD;  Location: [facility] ENDO;  Service: Endoscopy;  Laterality: N/A;    LEFT HEART CATHETERIZATION Left 9/10/2024    Procedure: Left heart cath;  Surgeon: Jemal Drummond MD;  Location: [facility] CATH LAB;  Service: Cardiology;  Laterality: Left;    SHOULDER SURGERY Right        Review of patient's allergies indicates:   Allergen Reactions    Dapagliflozin      Other reaction(s): Other (See Comments)    Pcn [penicillins]     Linagliptin Other (See Comments)     "it knocked me down", "it almost killed me"    Lisinopril Other (See Comments)     cough    Pantoprazole Hives       No current facility-administered medications on file prior to encounter.     Current Outpatient Medications on File Prior to Encounter   Medication Sig    albuterol-ipratropium (DUO-NEB) 2.5 mg-0.5 mg/3 mL nebulizer solution Take 3 mLs by nebulization every 6 (six) hours as needed for Wheezing. Rescue    alpha-D-galactosidase (BEANO ORAL) Take 1 tablet by mouth 2 (two) times a day.    amLODIPine (NORVASC) 10 MG tablet Take 1 tablet (10 mg total) by mouth once daily.    aspirin 81 MG Chew Take 81 mg by mouth once daily.    atorvastatin (LIPITOR) 40 MG tablet Take 1 tablet (40 mg total) by mouth every evening.    cholecalciferol, vitamin D3, (VITAMIN D3) 50 mcg (2,000 unit) Cap capsule 1 capsule.    furosemide (LASIX) 20 MG tablet Take 1 tablet (20 mg total) by mouth once daily.    glimepiride (AMARYL) 2 MG " tablet Take 1 tablet (2 mg total) by mouth 2 (two) times a day. Take with meals    metFORMIN (GLUCOPHAGE) 1000 MG tablet Take 1 tablet (1,000 mg total) by mouth 2 (two) times daily with meals.    metoprolol succinate (TOPROL-XL) 50 MG 24 hr tablet Take 1 tablet (50 mg total) by mouth once daily.    mirabegron (MYRBETRIQ) 25 mg Tb24 ER tablet Take 1 tablet (25 mg total) by mouth once daily.    mycophenolate (CELLCEPT) 250 mg Cap Take 2 capsules (500 mg total) by mouth 2 (two) times daily.    sacubitriL-valsartan (ENTRESTO) 24-26 mg per tablet Take 1 tablet by mouth 2 (two) times daily.    sucralfate (CARAFATE) 1 gram tablet Take 1 g by mouth 4 (four) times daily.    tamsulosin (FLOMAX) 0.4 mg Cap Take 1 capsule (0.4 mg total) by mouth once daily.     Family History       Problem Relation (Age of Onset)    Cancer Mother    Colon cancer Sister          Tobacco Use    Smoking status: Never     Passive exposure: Never    Smokeless tobacco: Never   Substance and Sexual Activity    Alcohol use: No    Drug use: Never    Sexual activity: Not Currently     Review of Systems   Respiratory:  Positive for cough and shortness of breath.    Cardiovascular: Negative.    Gastrointestinal: Negative.    Genitourinary: Negative.    Musculoskeletal: Negative.    Neurological: Negative.      Objective:     Vital Signs (Most Recent):  Temp: 98 °F (36.7 °C) (10/11/24 1441)  Pulse: 88 (10/11/24 2108)  Resp: 20 (10/11/24 2104)  BP: (!) 142/85 (10/11/24 2104)  SpO2: 99 % (10/11/24 2108) Vital Signs (24h Range):  Temp:  [98 °F (36.7 °C)] 98 °F (36.7 °C)  Pulse:  [88-95] 88  Resp:  [18-24] 20  SpO2:  [95 %-100 %] 99 %  BP: (116-146)/(73-92) 142/85     Weight: 63.5 kg (140 lb)  Body mass index is 21.29 kg/m².     Physical Exam  Vitals and nursing note reviewed.   Constitutional:       General: He is not in acute distress.     Appearance: He is not ill-appearing.   HENT:      Mouth/Throat:      Mouth: Mucous membranes are moist.   Cardiovascular:       Rate and Rhythm: Normal rate.   Pulmonary:      Effort: Pulmonary effort is normal.      Breath sounds: Rhonchi present.   Abdominal:      General: Abdomen is flat.   Skin:     General: Skin is warm.   Neurological:      General: No focal deficit present.      Mental Status: He is alert.                Significant Labs: All pertinent labs within the past 24 hours have been reviewed.    Significant Imaging: I have reviewed all pertinent imaging results/findings within the past 24 hours.

## 2024-10-12 NOTE — ASSESSMENT & PLAN NOTE
Presents with shortness of breath  Chest x-ray showed chronic interstitial lung disease or pulmonary fibrosis.    Possible ILD flare     Plan:   - Prednisone 20 mg daily   - Dragan   - Pulmonary consult

## 2024-10-12 NOTE — DISCHARGE INSTRUCTIONS
Take Steroid dose pack and Azithromycin dose pack until gone, as directed on boxes  Come back if you have sustained fevers or worsening SOB  Adjust insulin at home with steroid use  Follow up with your Pulmonologist      Thank you for trusting [facility] and me with your care.  We are honored that you entrusted us with your healthcare needs. Your satisfaction is very important to us and we hope you have been very pleased with your experience at [facility]. After your discharge you may receive a survey asking you to rate your hospital experience- Please help us by completing this survey. We hope that you have received the very best care possible during your hospitalization at [facility], as your satisfaction is our top priority.    Let me know if there is anything more I can do!!              Kristian Han MD        Medical Director        Section Head of         Board-Certified IM Attending      PATIENT GENERAL DISCHARGE INFORMATION   Things that YOU are responsible for to Manage Your Care At Home:  1. Getting your prescriptions filled.  2. Taking you medications as directed.  3. Going to your follow-up doctor appointments.                 *This is important because it allows the doctor to monitor your progress and make changes.      If no one calls you for your appointments, please call [phone] and reschedule this appointment.   After discharge, if you need assistance, you can call [facility] On Call Nurse Care Line for 24/7 assistance at [phone]  If you are experience any signs or symptoms, Call your doctor or Call [phone] and come to your nearest Emergency Room.    You should receive a call from [facility] Discharge Department within 48-72 hours to help manage your care after discharge.   Please try to make sure that you answer your phone for this important phone call.

## 2024-10-12 NOTE — NURSING
[facility]  Nurses Note -- 4 Eyes      10/12/2024       Skin assessed on: Q Shift      [x] No Pressure Injuries Present    []Prevention Measures Documented    [] Yes LDA  for Pressure Injury Previously documented     [] Yes New Pressure Injury Discovered   [] LDA for New Pressure Injury Added      Attending RN:  Telma Bennett RN     Second RN:  Princess

## 2024-10-12 NOTE — PLAN OF CARE
Problem: Adult Inpatient Plan of Care  Goal: Plan of Care Review  Outcome: Progressing  Flowsheets (Taken 10/12/2024 0815)  Plan of Care Reviewed With: patient  Goal: Patient-Specific Goal (Individualized)  Outcome: Progressing     Problem: Diabetes Comorbidity  Goal: Blood Glucose Level Within Targeted Range  Outcome: Progressing  Intervention: Monitor and Manage Glycemia  Flowsheets (Taken 10/12/2024 0815)  Glycemic Management:   blood glucose monitored   carbohydrate replacement provided   oral hydration promoted   supplemental insulin given     Problem: Adult Inpatient Plan of Care  Goal: Plan of Care Review  Outcome: Progressing  Flowsheets (Taken 10/12/2024 0815)  Plan of Care Reviewed With: patient     Problem: Adult Inpatient Plan of Care  Goal: Plan of Care Review  Outcome: Progressing  Flowsheets (Taken 10/12/2024 0815)  Plan of Care Reviewed With: patient     Problem: Adult Inpatient Plan of Care  Goal: Patient-Specific Goal (Individualized)  Outcome: Progressing     Problem: Adult Inpatient Plan of Care  Goal: Patient-Specific Goal (Individualized)  Outcome: Progressing     Problem: Diabetes Comorbidity  Goal: Blood Glucose Level Within Targeted Range  Outcome: Progressing  Intervention: Monitor and Manage Glycemia  Flowsheets (Taken 10/12/2024 0815)  Glycemic Management:   blood glucose monitored   carbohydrate replacement provided   oral hydration promoted   supplemental insulin given     Problem: Diabetes Comorbidity  Goal: Blood Glucose Level Within Targeted Range  Outcome: Progressing     Problem: Diabetes Comorbidity  Goal: Blood Glucose Level Within Targeted Range  Intervention: Monitor and Manage Glycemia  Flowsheets (Taken 10/12/2024 0815)  Glycemic Management:   blood glucose monitored   carbohydrate replacement provided   oral hydration promoted   supplemental insulin given     Problem: Diabetes Comorbidity  Goal: Blood Glucose Level Within Targeted Range  Intervention: Monitor and Manage  Glycemia  Flowsheets (Taken 10/12/2024 0815)  Glycemic Management:   blood glucose monitored   carbohydrate replacement provided   oral hydration promoted   supplemental insulin given

## 2024-10-12 NOTE — HPI
Emmanuel Grant is a 73 year old male with history of ILD (on home O2), CAD, HFrEF (EF 40% with grade I DD), HTN, DMII, HLD, CVA, BPH, who presents with acute worsening of his chronic SOB.  He is having cough with white sputum production that is causing post-tussive emesis. Denies sick contacts or tobacco abuse.    In the ER he was placed on 2LPM via NC with adequate saturations.  BNP was 67, troponin was WNL, VBG showed 7.41/49.9.  Chest x-ray showed chronic findings consistent with ILD.  Electrolytes were repleted and he was started on duonebs, and prednisone taper.  Pulm consulted for further input.    He normally follows with Dr. Naheed Schmitt.  He has responded favorably to steroid tapers in the past, but AI workup has been largely negative.  Recently started on MMF.  There are thoughts that severe GERD is contributing to his ILD and he is establishing care with GI to address this issue.    Childhood Illnesses:  none  Occupational:   works in air conditioning in installation and repair  Environmental:   no pets, no seasonal allergies, no carpet in his home and no concern for mold or allergy exposures there  Tobacco/Smoking:   never smoker    TTE with Grade I DD and EF if 40-45%

## 2024-10-13 DIAGNOSIS — K21.9 GASTROESOPHAGEAL REFLUX DISEASE, UNSPECIFIED WHETHER ESOPHAGITIS PRESENT: Primary | ICD-10-CM

## 2024-10-13 RX ORDER — SUCRALFATE 1 G/1
1 TABLET ORAL
Qty: 120 TABLET | Refills: 2 | Status: SHIPPED | OUTPATIENT
Start: 2024-10-13

## 2024-10-14 LAB
MYCOPHENOLATE SERPL-MCNC: 1.5 MCG/ML (ref 1–3.5)
MYCOPHENOLATE-G SERPL-MCNC: 53 MCG/ML (ref 35–100)

## 2024-10-15 ENCOUNTER — PATIENT OUTREACH (OUTPATIENT)
Dept: ADMINISTRATIVE | Facility: CLINIC | Age: 74
End: 2024-10-15
Payer: MEDICARE

## 2024-10-15 NOTE — PROGRESS NOTES
C3 nurse attempted to contact [facility] for a TCC post hospital discharge follow up call. No answer or voicemail available. The patient has a scheduled HOSFU with Corwin Yañez NP ([facility]) on 10/21/24 at 1400. No messages routed at this time.

## 2024-10-15 NOTE — PROGRESS NOTES
C3 nurse attempted to contact Emmanuel Grant for a TCC post hospital discharge follow-up call. The patient politely declined call at this time, stating HH came yesterday to review his medications. Jordankleber Ysabelchristina has a HOSFU with Corwin Yañez NP ([facility]) on 10/21/24 at 1400. No messages routed at this time.

## 2024-10-17 ENCOUNTER — HOSPITAL ENCOUNTER (OUTPATIENT)
Facility: HOSPITAL | Age: 74
Discharge: HOME | End: 2024-10-18
Attending: EMERGENCY MEDICINE | Admitting: EMERGENCY MEDICINE
Payer: MEDICARE

## 2024-10-17 ENCOUNTER — TELEPHONE (OUTPATIENT)
Dept: FAMILY MEDICINE | Facility: CLINIC | Age: 74
End: 2024-10-17
Payer: MEDICARE

## 2024-10-17 DIAGNOSIS — J84.9 INTERSTITIAL LUNG DISEASE: ICD-10-CM

## 2024-10-17 DIAGNOSIS — R07.9 CHEST PAIN: Primary | ICD-10-CM

## 2024-10-17 DIAGNOSIS — K21.9 GASTROESOPHAGEAL REFLUX DISEASE, UNSPECIFIED WHETHER ESOPHAGITIS PRESENT: ICD-10-CM

## 2024-10-17 DIAGNOSIS — I25.119 CORONARY ARTERY DISEASE INVOLVING NATIVE CORONARY ARTERY OF NATIVE HEART WITH ANGINA PECTORIS: ICD-10-CM

## 2024-10-17 LAB
ALBUMIN SERPL BCP-MCNC: 3.4 G/DL (ref 3.5–5.2)
ALP SERPL-CCNC: 54 U/L (ref 55–135)
ALT SERPL W/O P-5'-P-CCNC: 27 U/L (ref 10–44)
ANION GAP SERPL CALC-SCNC: 9 MMOL/L (ref 8–16)
APICAL FOUR CHAMBER EJECTION FRACTION: 58 %
AST SERPL-CCNC: 16 U/L (ref 10–40)
AV INDEX (PROSTH): 0.71
AV MEAN GRADIENT: 4 MMHG
AV PEAK GRADIENT: 5.8 MMHG
AV VALVE AREA BY VELOCITY RATIO: 2.1 CM²
AV VALVE AREA: 2.2 CM²
AV VELOCITY RATIO: 0.67
BASOPHILS # BLD AUTO: 0.01 K/UL (ref 0–0.2)
BASOPHILS NFR BLD: 0.1 % (ref 0–1.9)
BILIRUB SERPL-MCNC: 0.7 MG/DL (ref 0.1–1)
BNP SERPL-MCNC: 96 PG/ML (ref 0–99)
BSA FOR ECHO PROCEDURE: 1.81 M2
BUN SERPL-MCNC: 15 MG/DL (ref 8–23)
CALCIUM SERPL-MCNC: 9.4 MG/DL (ref 8.7–10.5)
CHLORIDE SERPL-SCNC: 101 MMOL/L (ref 95–110)
CO2 SERPL-SCNC: 26 MMOL/L (ref 23–29)
CREAT SERPL-MCNC: 1.1 MG/DL (ref 0.5–1.4)
CV ECHO LV RWT: 0.41 CM
DIFFERENTIAL METHOD BLD: ABNORMAL
DOP CALC AO PEAK VEL: 1.2 M/S
DOP CALC AO VTI: 20.4 CM
DOP CALC LVOT AREA: 3.1 CM2
DOP CALC LVOT DIAMETER: 2 CM
DOP CALC LVOT PEAK VEL: 0.8 M/S
DOP CALC LVOT STROKE VOLUME: 45.5 CM3
DOP CALC MV VTI: 19.7 CM
DOP CALCLVOT PEAK VEL VTI: 14.5 CM
E WAVE DECELERATION TIME: 230.22 MSEC
E/A RATIO: 0.55
E/E' RATIO: 10.8 M/S
ECHO LV POSTERIOR WALL: 0.7 CM (ref 0.6–1.1)
EOSINOPHIL # BLD AUTO: 0.5 K/UL (ref 0–0.5)
EOSINOPHIL NFR BLD: 4.2 % (ref 0–8)
ERYTHROCYTE [DISTWIDTH] IN BLOOD BY AUTOMATED COUNT: 14.1 % (ref 11.5–14.5)
EST. GFR  (NO RACE VARIABLE): >60 ML/MIN/1.73 M^2
FRACTIONAL SHORTENING: 44.1 % (ref 28–44)
GLUCOSE SERPL-MCNC: 230 MG/DL (ref 70–110)
HCT VFR BLD AUTO: 40.9 % (ref 40–54)
HGB BLD-MCNC: 13.9 G/DL (ref 14–18)
IMM GRANULOCYTES # BLD AUTO: 0.05 K/UL (ref 0–0.04)
IMM GRANULOCYTES NFR BLD AUTO: 0.5 % (ref 0–0.5)
INTERVENTRICULAR SEPTUM: 0.8 CM (ref 0.6–1.1)
IVC DIAMETER: 1.36 CM
LA MAJOR: 4.06 CM
LA MINOR: 3.56 CM
LEFT ATRIUM SIZE: 3.44 CM
LEFT INTERNAL DIMENSION IN SYSTOLE: 1.9 CM (ref 2.1–4)
LEFT VENTRICLE DIASTOLIC VOLUME INDEX: 25.72 ML/M2
LEFT VENTRICLE DIASTOLIC VOLUME: 46.55 ML
LEFT VENTRICLE END DIASTOLIC VOLUME APICAL 4 CHAMBER: 126.51 ML
LEFT VENTRICLE MASS INDEX: 36.3 G/M2
LEFT VENTRICLE SYSTOLIC VOLUME INDEX: 6.3 ML/M2
LEFT VENTRICLE SYSTOLIC VOLUME: 11.41 ML
LEFT VENTRICULAR INTERNAL DIMENSION IN DIASTOLE: 3.4 CM (ref 3.5–6)
LEFT VENTRICULAR MASS: 65.8 G
LV LATERAL E/E' RATIO: 9 M/S
LV SEPTAL E/E' RATIO: 13.5 M/S
LVED V (TEICH): 46.55 ML
LVES V (TEICH): 11.41 ML
LVOT MG: 1.49 MMHG
LVOT MV: 0.57 CM/S
LYMPHOCYTES # BLD AUTO: 1.6 K/UL (ref 1–4.8)
LYMPHOCYTES NFR BLD: 14.6 % (ref 18–48)
MCH RBC QN AUTO: 28.1 PG (ref 27–31)
MCHC RBC AUTO-ENTMCNC: 34 G/DL (ref 32–36)
MCV RBC AUTO: 83 FL (ref 82–98)
MONOCYTES # BLD AUTO: 0.8 K/UL (ref 0.3–1)
MONOCYTES NFR BLD: 7 % (ref 4–15)
MV MEAN GRADIENT: 2 MMHG
MV PEAK A VEL: 0.98 M/S
MV PEAK E VEL: 0.54 M/S
MV PEAK GRADIENT: 4 MMHG
MV STENOSIS PRESSURE HALF TIME: 66.76 MS
MV VALVE AREA BY CONTINUITY EQUATION: 2.31 CM2
MV VALVE AREA P 1/2 METHOD: 3.3 CM2
NEUTROPHILS # BLD AUTO: 7.9 K/UL (ref 1.8–7.7)
NEUTROPHILS NFR BLD: 73.6 % (ref 38–73)
NRBC BLD-RTO: 0 /100 WBC
PLATELET # BLD AUTO: 179 K/UL (ref 150–450)
PMV BLD AUTO: 10 FL (ref 9.2–12.9)
POCT GLUCOSE: 147 MG/DL (ref 70–110)
POTASSIUM SERPL-SCNC: 4.7 MMOL/L (ref 3.5–5.1)
PROT SERPL-MCNC: 6.7 G/DL (ref 6–8.4)
RA PRESSURE ESTIMATED: 3 MMHG
RBC # BLD AUTO: 4.94 M/UL (ref 4.6–6.2)
RIGHT VENTRICULAR END-DIASTOLIC DIMENSION: 2.96 CM
RV TISSUE DOPPLER FREE WALL SYSTOLIC VELOCITY 1 (APICAL 4 CHAMBER VIEW): 10.64 CM/S
SINUS: 3.53 CM
SODIUM SERPL-SCNC: 136 MMOL/L (ref 136–145)
STJ: 2.76 CM
TDI LATERAL: 0.06 M/S
TDI SEPTAL: 0.04 M/S
TDI: 0.05 M/S
TRICUSPID ANNULAR PLANE SYSTOLIC EXCURSION: 1.39 CM
TROPONIN I SERPL DL<=0.01 NG/ML-MCNC: 0.01 NG/ML (ref 0–0.03)
TROPONIN I SERPL DL<=0.01 NG/ML-MCNC: <0.006 NG/ML (ref 0–0.03)
TROPONIN I SERPL DL<=0.01 NG/ML-MCNC: <0.006 NG/ML (ref 0–0.03)
WBC # BLD AUTO: 10.67 K/UL (ref 3.9–12.7)
Z-SCORE OF LEFT VENTRICULAR DIMENSION IN END DIASTOLE: -3.87
Z-SCORE OF LEFT VENTRICULAR DIMENSION IN END SYSTOLE: -3.85

## 2024-10-17 PROCEDURE — G0378 HOSPITAL OBSERVATION PER HR: HCPCS

## 2024-10-17 PROCEDURE — 82962 GLUCOSE BLOOD TEST: CPT

## 2024-10-17 PROCEDURE — 84484 ASSAY OF TROPONIN QUANT: CPT | Mod: 91

## 2024-10-17 PROCEDURE — 63600175 PHARM REV CODE 636 W HCPCS

## 2024-10-17 PROCEDURE — 25000003 PHARM REV CODE 250

## 2024-10-17 PROCEDURE — 96372 THER/PROPH/DIAG INJ SC/IM: CPT

## 2024-10-17 PROCEDURE — 94761 N-INVAS EAR/PLS OXIMETRY MLT: CPT

## 2024-10-17 PROCEDURE — 99285 EMERGENCY DEPT VISIT HI MDM: CPT | Mod: 25

## 2024-10-17 PROCEDURE — 85025 COMPLETE CBC W/AUTO DIFF WBC: CPT | Performed by: EMERGENCY MEDICINE

## 2024-10-17 PROCEDURE — 93010 ELECTROCARDIOGRAM REPORT: CPT | Mod: ,,, | Performed by: INTERNAL MEDICINE

## 2024-10-17 PROCEDURE — 93005 ELECTROCARDIOGRAM TRACING: CPT

## 2024-10-17 PROCEDURE — 83880 ASSAY OF NATRIURETIC PEPTIDE: CPT | Performed by: EMERGENCY MEDICINE

## 2024-10-17 PROCEDURE — 84484 ASSAY OF TROPONIN QUANT: CPT | Performed by: EMERGENCY MEDICINE

## 2024-10-17 PROCEDURE — 80053 COMPREHEN METABOLIC PANEL: CPT | Performed by: EMERGENCY MEDICINE

## 2024-10-17 RX ORDER — FUROSEMIDE 20 MG/1
20 TABLET ORAL DAILY
Status: DISCONTINUED | OUTPATIENT
Start: 2024-10-17 | End: 2024-10-18 | Stop reason: HOSPADM

## 2024-10-17 RX ORDER — ISOSORBIDE MONONITRATE 30 MG/1
30 TABLET, EXTENDED RELEASE ORAL DAILY
Status: DISCONTINUED | OUTPATIENT
Start: 2024-10-18 | End: 2024-10-18 | Stop reason: HOSPADM

## 2024-10-17 RX ORDER — TAMSULOSIN HYDROCHLORIDE 0.4 MG/1
0.4 CAPSULE ORAL DAILY
Status: DISCONTINUED | OUTPATIENT
Start: 2024-10-17 | End: 2024-10-18 | Stop reason: HOSPADM

## 2024-10-17 RX ORDER — OMEPRAZOLE 40 MG/1
40 CAPSULE, DELAYED RELEASE ORAL EVERY MORNING
COMMUNITY
Start: 2024-10-14

## 2024-10-17 RX ORDER — MYCOPHENOLATE MOFETIL 250 MG/1
500 CAPSULE ORAL 2 TIMES DAILY
Status: DISCONTINUED | OUTPATIENT
Start: 2024-10-17 | End: 2024-10-18 | Stop reason: HOSPADM

## 2024-10-17 RX ORDER — INSULIN ASPART 100 [IU]/ML
0-10 INJECTION, SOLUTION INTRAVENOUS; SUBCUTANEOUS
Status: DISCONTINUED | OUTPATIENT
Start: 2024-10-17 | End: 2024-10-18 | Stop reason: HOSPADM

## 2024-10-17 RX ORDER — POLYETHYLENE GLYCOL 3350 17 G/17G
17 POWDER, FOR SOLUTION ORAL DAILY
Status: DISCONTINUED | OUTPATIENT
Start: 2024-10-17 | End: 2024-10-18 | Stop reason: HOSPADM

## 2024-10-17 RX ORDER — ONDANSETRON HYDROCHLORIDE 2 MG/ML
4 INJECTION, SOLUTION INTRAVENOUS EVERY 8 HOURS PRN
Status: DISCONTINUED | OUTPATIENT
Start: 2024-10-17 | End: 2024-10-18 | Stop reason: HOSPADM

## 2024-10-17 RX ORDER — AMLODIPINE BESYLATE 5 MG/1
10 TABLET ORAL DAILY
Status: DISCONTINUED | OUTPATIENT
Start: 2024-10-17 | End: 2024-10-18 | Stop reason: HOSPADM

## 2024-10-17 RX ORDER — ENOXAPARIN SODIUM 100 MG/ML
40 INJECTION SUBCUTANEOUS EVERY 24 HOURS
Status: DISCONTINUED | OUTPATIENT
Start: 2024-10-17 | End: 2024-10-18 | Stop reason: HOSPADM

## 2024-10-17 RX ORDER — NALOXONE HCL 0.4 MG/ML
0.02 VIAL (ML) INJECTION
Status: DISCONTINUED | OUTPATIENT
Start: 2024-10-17 | End: 2024-10-18 | Stop reason: HOSPADM

## 2024-10-17 RX ORDER — NITROGLYCERIN 0.4 MG/1
0.4 TABLET SUBLINGUAL EVERY 5 MIN PRN
Status: DISCONTINUED | OUTPATIENT
Start: 2024-10-17 | End: 2024-10-18 | Stop reason: HOSPADM

## 2024-10-17 RX ORDER — GLUCAGON 1 MG
1 KIT INJECTION
Status: DISCONTINUED | OUTPATIENT
Start: 2024-10-17 | End: 2024-10-18 | Stop reason: HOSPADM

## 2024-10-17 RX ORDER — ACETAMINOPHEN 325 MG/1
650 TABLET ORAL EVERY 4 HOURS PRN
Status: DISCONTINUED | OUTPATIENT
Start: 2024-10-17 | End: 2024-10-18 | Stop reason: HOSPADM

## 2024-10-17 RX ORDER — TALC
6 POWDER (GRAM) TOPICAL NIGHTLY PRN
Status: DISCONTINUED | OUTPATIENT
Start: 2024-10-17 | End: 2024-10-18 | Stop reason: HOSPADM

## 2024-10-17 RX ORDER — IBUPROFEN 200 MG
24 TABLET ORAL
Status: DISCONTINUED | OUTPATIENT
Start: 2024-10-17 | End: 2024-10-18 | Stop reason: HOSPADM

## 2024-10-17 RX ORDER — METOPROLOL SUCCINATE 50 MG/1
50 TABLET, EXTENDED RELEASE ORAL DAILY
Status: DISCONTINUED | OUTPATIENT
Start: 2024-10-17 | End: 2024-10-18 | Stop reason: HOSPADM

## 2024-10-17 RX ORDER — NAPROXEN SODIUM 220 MG/1
81 TABLET, FILM COATED ORAL DAILY
Status: DISCONTINUED | OUTPATIENT
Start: 2024-10-17 | End: 2024-10-18 | Stop reason: HOSPADM

## 2024-10-17 RX ORDER — SODIUM CHLORIDE 0.9 % (FLUSH) 0.9 %
10 SYRINGE (ML) INJECTION EVERY 12 HOURS PRN
Status: DISCONTINUED | OUTPATIENT
Start: 2024-10-17 | End: 2024-10-18 | Stop reason: HOSPADM

## 2024-10-17 RX ORDER — IBUPROFEN 200 MG
16 TABLET ORAL
Status: DISCONTINUED | OUTPATIENT
Start: 2024-10-17 | End: 2024-10-18 | Stop reason: HOSPADM

## 2024-10-17 RX ADMIN — FUROSEMIDE 20 MG: 20 TABLET ORAL at 03:10

## 2024-10-17 RX ADMIN — ASPIRIN 81 MG CHEWABLE TABLET 81 MG: 81 TABLET CHEWABLE at 03:10

## 2024-10-17 RX ADMIN — Medication 16 G: at 02:10

## 2024-10-17 RX ADMIN — MYCOPHENOLATE MOFETIL 500 MG: 250 CAPSULE ORAL at 08:10

## 2024-10-17 RX ADMIN — METOPROLOL SUCCINATE 50 MG: 50 TABLET, EXTENDED RELEASE ORAL at 03:10

## 2024-10-17 RX ADMIN — TAMSULOSIN HYDROCHLORIDE 0.4 MG: 0.4 CAPSULE ORAL at 03:10

## 2024-10-17 RX ADMIN — AMLODIPINE BESYLATE 10 MG: 5 TABLET ORAL at 03:10

## 2024-10-17 RX ADMIN — SACUBITRIL AND VALSARTAN 1 TABLET: 24; 26 TABLET, FILM COATED ORAL at 08:10

## 2024-10-17 RX ADMIN — ENOXAPARIN SODIUM 40 MG: 40 INJECTION SUBCUTANEOUS at 03:10

## 2024-10-17 NOTE — Clinical Note
Diagnosis: Chest pain [677346]   Future Attending Provider: DIMAS HAYES [[id number]]   Requested Bed Type: Standard [1]   Special Needs:: No Special Needs [1]

## 2024-10-17 NOTE — ED NOTES
Bed: 18main  Expected date:   Expected time:   Means of arrival:   Comments:  EMS   I will START or STAY ON the medications listed below when I get home from the hospital:    FLUoxetine 20 mg/5 mL oral solution  -- 5 milliliter(s) by mouth once a day  -- Indication: For Attention deficit hyperactivity disorder (ADHD), unspecified ADHD type    diphenhydrAMINE 12.5 mg/5 mL oral liquid  -- 5 milliliter(s) by mouth 2 times a day  -- Indication: For Chronic urticaria    albuterol 90 mcg/inh inhalation aerosol  -- 4 puff(s) inhaled every 4 hours, As needed, Wheezing  -- Indication: For Asthma    raNITIdine 15 mg/mL oral syrup  -- 5 milliliter(s) by mouth once a day  -- Indication: For Gerd    senna 8.8 mg/5 mL oral syrup  -- 5 milliliter(s) by mouth once a day  -- Indication: For Constipation    montelukast 5 mg oral tablet, chewable  -- 1 tab(s) by mouth once a day (at bedtime)  -- Indication: For Asthma    melatonin 5 mg oral tablet  -- 1 tab(s) by mouth once a day (at bedtime)  -- Indication: For insomnia    nitrofurantoin 25 mg/5 mL oral suspension  -- 5 milliliter(s) by mouth once a day  -- Indication: For UTI prophylaxis

## 2024-10-17 NOTE — ASSESSMENT & PLAN NOTE
Patient with known CAD s/p stent placement, which is controlled Will continue ASA and Statin and monitor for S/Sx of angina/ACS. Continue to monitor on telemetry.     Patient presented with substernal chest pain described as heaviness/burning with the associated shortness on breath.  Initial troponin within normal limits, EKG with NSR and T-wave inversions in high lateral leads.  Patient given nitroglycerin aspirin in route via EMS with slight improvement in pain. Currently still experiencing minimal chest heaviness.    Plan:  Trend troponin  Telemetry  Echocardiogram ordered  Cardiology consulted and would appreciate recommendations

## 2024-10-17 NOTE — PHARMACY MED REC
"  Admission Medication History     The home medication history was taken by Taryn Peace CPhT.    You may go to "Admission" then "Reconcile Home Medications" tabs to review and/or act upon these items.     The home medication list has been updated by the Pharmacy department.   Please read ALL comments highlighted in yellow.   Please address this information as you see fit.    Feel free to contact us if you have any questions or require assistance.        Medications listed below were obtained from: Patient/family, Analytic software- [employer], and Patient's pharmacy  (Not in a hospital admission)      Potential issues to be addressed PRIOR TO DISCHARGE  Please discuss with the patient barriers to adherence with medication treatment plans  Patient requires education regarding drug therapies       Patient was asked last dose of azithromycin and methylprednisolone and patient did not remember if he was taking that medication.    Called patient preferred pharmacy and the pharmacist stated those two medication were never picked up from the pharmacy.      Taryn Peace CPhT.  [phone]                .          "

## 2024-10-17 NOTE — HPI
Emmanuel Grant is a 73 y.o. with a pmh of ILD (on home O2), CAD/MI/PCI, HFrEF with EF: 40%, hypertension, DM 2, hyperlipidemia, CVA, and BPH presents to the hospital with complaints of midsternal chest pain described as heaviness/burning after a morning walk today. Patient also complains of associated exertional dyspnea. Per ED note, patient was administered SL nitroglycerin and 324 mg aspirin en route with improvement in pain.  Patient with a history of interstitial lung disease on chronic oxygen at home (2 L). Patient denies any other alleviating or exacerbating factors. Patient denies headache, blurry vision, diaphoresis, nausea, vomiting, diarrhea, melena, hematemesis, or any other associated symptoms.    In the ED: Patient afebrile without leukocytosis, hemodynamically stable on presentation, ALP 54, normal BNP 96, initial troponin normal, chest x-ray with no acute findings.  EKG shows NSR with T-wave inversions in high lateral leads. Case discussed with ED provider and patient will be placed under observation for further medical management.

## 2024-10-17 NOTE — ASSESSMENT & PLAN NOTE
Patient presents with chest pain and shortness on breath.  Chest x-ray with chronic findings of interstitial lung disease/pulmonary fibrosis    Plan:   Continue prednisone 10 mg for 1 week (per pulmonary note from last week), then 5 mg for 1 week, and then stop  Duo nebs  Continue supplemental oxygen as needed (O2 saturation 88-96%)   Received a fax from [facility] \"Parathyroid Hormone Analogs, Prior authorization request\".- Form was faxed to precert team with message marked \"urgent\".

## 2024-10-17 NOTE — H&P
"  [facility]  [facility] Medicine  History & Physical    Patient Name: Emmanuel Grant  MRN: [id number]  Patient Class: OP- Observation  Admission Date: 10/17/2024  Attending Physician: Hui White,*   Primary Care Provider: Wilfredo De Souza MD         Patient information was obtained from patient, past medical records, and ER records.     Subjective:     Principal Problem:Coronary artery disease involving native coronary artery of native heart with angina pectoris    Chief Complaint:   Chief Complaint   Patient presents with    Chest Pain     Pt BIB EMS complaining midsternal chest " heaviness " starting on this morning after talking morning walk. Pt hx of MI and recently had stent placed. Pt also reports SOB with home O2 at 2L. 325mg asa/ 1 nitro given per ems.         HPI: Emmanuel Grant is a 73 y.o. with a pmh of ILD (on home O2), CAD/MI/PCI, HFrEF with EF: 40%, hypertension, DM 2, hyperlipidemia, CVA, and BPH presents to the hospital with complaints of midsternal chest pain described as heaviness/burning after a morning walk today. Patient also complains of associated exertional dyspnea. Per ED note, patient was administered SL nitroglycerin and 324 mg aspirin en route with improvement in pain.  Patient with a history of interstitial lung disease on chronic oxygen at home (2 L). Patient denies any other alleviating or exacerbating factors. Patient denies headache, blurry vision, diaphoresis, nausea, vomiting, diarrhea, melena, hematemesis, or any other associated symptoms.    In the ED: Patient afebrile without leukocytosis, hemodynamically stable on presentation, ALP 54, normal BNP 96, initial troponin normal, chest x-ray with no acute findings.  EKG shows NSR with T-wave inversions in high lateral leads. Case discussed with ED provider and patient will be placed under observation for further medical management.    Past Medical History:   Diagnosis Date    CAD (coronary artery disease)  " "   Sees [facility], h/o stent    Diabetes mellitus     Hypertension     Kidney stone     Stroke     age 62       Past Surgical History:   Procedure Laterality Date    CARDIAC CATHETERIZATION      with stent    COLONOSCOPY N/A 03/27/2024    Procedure: COLONOSCOPY;  Surgeon: Ariela Castro MD;  Location: [facility] ENDO;  Service: Endoscopy;  Laterality: N/A;    ESOPHAGOGASTRODUODENOSCOPY N/A 03/27/2024    Procedure: EGD (ESOPHAGOGASTRODUODENOSCOPY);  Surgeon: Ariela Castro MD;  Location: [facility] ENDO;  Service: Endoscopy;  Laterality: N/A;    LEFT HEART CATHETERIZATION Left 9/10/2024    Procedure: Left heart cath;  Surgeon: Jemal Drummond MD;  Location: [facility] CATH LAB;  Service: Cardiology;  Laterality: Left;    SHOULDER SURGERY Right        Review of patient's allergies indicates:   Allergen Reactions    Dapagliflozin      Other reaction(s): Other (See Comments)    Pcn [penicillins]     Linagliptin Other (See Comments)     "it knocked me down", "it almost killed me"    Lisinopril Other (See Comments)     cough    Pantoprazole Hives       No current facility-administered medications on file prior to encounter.     Current Outpatient Medications on File Prior to Encounter   Medication Sig    alpha-D-galactosidase (BEANO ORAL) Take 1 tablet by mouth 2 (two) times a day.    amLODIPine (NORVASC) 10 MG tablet Take 1 tablet (10 mg total) by mouth once daily.    aspirin 81 MG Chew Take 81 mg by mouth once daily.    atorvastatin (LIPITOR) 40 MG tablet Take 1 tablet (40 mg total) by mouth every evening.    cholecalciferol, vitamin D3, (VITAMIN D3) 50 mcg (2,000 unit) Cap capsule Take 1 capsule by mouth once daily.    furosemide (LASIX) 20 MG tablet Take 1 tablet (20 mg total) by mouth once daily.    glimepiride (AMARYL) 2 MG tablet Take 1 tablet (2 mg total) by mouth 2 (two) times a day. Take with meals    metFORMIN (GLUCOPHAGE) 1000 MG tablet Take 1 tablet (1,000 mg total) by mouth 2 (two) times daily with meals.    " metoprolol succinate (TOPROL-XL) 50 MG 24 hr tablet Take 1 tablet (50 mg total) by mouth once daily.    mirabegron (MYRBETRIQ) 25 mg Tb24 ER tablet Take 1 tablet (25 mg total) by mouth once daily.    mycophenolate (CELLCEPT) 250 mg Cap Take 2 capsules (500 mg total) by mouth 2 (two) times daily.    sacubitriL-valsartan (ENTRESTO) 24-26 mg per tablet Take 1 tablet by mouth 2 (two) times daily.    tamsulosin (FLOMAX) 0.4 mg Cap Take 1 capsule (0.4 mg total) by mouth once daily.    albuterol-ipratropium (DUO-NEB) 2.5 mg-0.5 mg/3 mL nebulizer solution Take 3 mLs by nebulization every 6 (six) hours as needed for Wheezing. Rescue    azithromycin (Z-ARA) 250 MG tablet Take 2 tablets by mouth on day 1; Take 1 tablet by mouth on days 2-5    methylPREDNISolone (MEDROL DOSEPACK) 4 mg tablet use as directed    omeprazole (PRILOSEC) 40 MG capsule Take 40 mg by mouth every morning.    sucralfate (CARAFATE) 1 gram tablet Take 1 tablet (1 g total) by mouth 4 (four) times daily before meals and nightly.     Family History       Problem Relation (Age of Onset)    Cancer Mother    Colon cancer Sister          Tobacco Use    Smoking status: Never     Passive exposure: Never    Smokeless tobacco: Never   Substance and Sexual Activity    Alcohol use: No    Drug use: Never    Sexual activity: Not Currently     Review of Systems   Constitutional: Negative.    HENT: Negative.     Respiratory:  Positive for cough and shortness of breath.    Cardiovascular:  Positive for chest pain (Still endorsing minimal chest heaviness).   Gastrointestinal: Negative.    Genitourinary: Negative.    Musculoskeletal: Negative.    Neurological: Negative.    Psychiatric/Behavioral: Negative.       Objective:     Vital Signs (Most Recent):  Temp: 98.3 °F (36.8 °C) (10/17/24 0819)  Pulse: 79 (10/17/24 1233)  Resp: 19 (10/17/24 1232)  BP: (!) 144/88 (10/17/24 1232)  SpO2: 100 % (10/17/24 1232) Vital Signs (24h Range):  Temp:  [98.3 °F (36.8 °C)] 98.3 °F (36.8  °C)  Pulse:  [75-89] 79  Resp:  [16-25] 19  SpO2:  [97 %-100 %] 100 %  BP: ()/(62-88) 144/88     Weight: 68 kg (150 lb)  Body mass index is 22.81 kg/m².     Physical Exam  Constitutional:       General: He is not in acute distress.     Appearance: Normal appearance.   HENT:      Head: Normocephalic and atraumatic.      Mouth/Throat:      Mouth: Mucous membranes are moist.   Eyes:      Extraocular Movements: Extraocular movements intact.   Cardiovascular:      Rate and Rhythm: Normal rate and regular rhythm.      Pulses: Normal pulses.      Comments: EKG in ED NSR and T-wave inversions in high lateral leads, no STEMI  Pulmonary:      Effort: Pulmonary effort is normal.      Breath sounds: Normal breath sounds.      Comments: On 2 L oxygen via NC  Abdominal:      General: Abdomen is flat.   Musculoskeletal:      Right lower leg: No edema.      Left lower leg: No edema.   Skin:     General: Skin is warm.   Neurological:      General: No focal deficit present.      Mental Status: He is alert and oriented to person, place, and time. Mental status is at baseline.   Psychiatric:         Mood and Affect: Mood normal.         Behavior: Behavior normal.         Thought Content: Thought content normal.                Significant Labs: All pertinent labs within the past 24 hours have been reviewed.    Significant Imaging: I have reviewed all pertinent imaging results/findings within the past 24 hours.  Imaging Results              X-Ray Chest AP Portable (Final result)  Result time 10/17/24 09:54:23      Final result by Isiah Barreto MD (10/17/24 09:54:23)                   Impression:      No convincing evidence of acute detrimental change relative prior examination performed 10/11/2024, 16:34 hours.      Electronically signed by: Isiah Barreto  Date:    10/17/2024  Time:    09:54               Narrative:    EXAMINATION:  XR CHEST AP PORTABLE    CLINICAL HISTORY:  Chest pain, unspecified    TECHNIQUE:  Single  frontal view of the chest was performed.    COMPARISON:  Chest radiograph performed 10/11/2024, 16:34 hours.    FINDINGS:  Monitoring leads overlie the chest.    Grossly unchanged cardiomediastinal contours.    Coarsened interstitial predominant opacities appear relatively similar to prior examination performed 10/11/2024, 16:34 hours.    No definite acute appearing focal airspace consolidation.    No definite pneumothorax or enlarging pleural effusion.    No acute findings in the visualized abdomen.    Osseous and soft tissue structures appear without definite acute change.                                      Assessment/Plan:     * Coronary artery disease involving native coronary artery of native heart with angina pectoris  Patient with known CAD s/p stent placement, which is controlled Will continue ASA and Statin and monitor for S/Sx of angina/ACS. Continue to monitor on telemetry.     Patient presented with substernal chest pain described as heaviness/burning with the associated shortness on breath.  Initial troponin within normal limits, EKG with NSR and T-wave inversions in high lateral leads.  Patient given nitroglycerin aspirin in route via EMS with slight improvement in pain. Currently still experiencing minimal chest heaviness.    Plan:  Trend troponin  Telemetry  Echocardiogram ordered  Cardiology consulted and would appreciate recommendations    Chest pain  -See plan above for CAD with angina pectoris    Chronic hypoxemic respiratory failure  Patient with Hypoxic Respiratory failure which is Acute on chronic.  he is on home oxygen at 2 LPM. Supplemental oxygen was provided and noted-      Continue 2 L oxygen  Targeted sats 88-96% with supplemental oxygen    Chronic HFrEF (heart failure with reduced ejection fraction)  Results for orders placed during the hospital encounter of 07/02/24    Echo    Interpretation Summary    Left Ventricle: The left ventricle is normal in size. Normal wall thickness.  Regional wall motion abnormalities and Global hypokinesis present. See diagram for wall motion findings. There is mildly reduced systolic function with a visually estimated ejection fraction of 40 - 45%. Biplane (2D) method of discs ejection fraction is 43%. Global longitudinal strain is -13.0%.    Right Ventricle: Normal right ventricular cavity size. Wall thickness is normal. Systolic function is normal.    Aortic Valve: The aortic valve is a trileaflet valve. There is mild aortic valve sclerosis.    Mitral Valve: There is mild bileaflet sclerosis. There is mild mitral annular calcification present.    Pulmonic Valve: There is mild regurgitation.    Pulmonary Artery: The estimated pulmonary artery systolic pressure is 24 mmHg.    IVC/SVC: Normal venous pressure at 3 mmHg.    BNP: 96  No acute issues  Continue home medication    Hyperlipidemia  Continue statin    Essential hypertension  Patients blood pressure range in the last 24 hours was: BP  Min: 95/62  Max: 144/88.The patient's inpatient anti-hypertensive regimen is listed below:  Current Antihypertensives  amLODIPine tablet 10 mg, Daily, Oral  furosemide tablet 20 mg, Daily, Oral  metoprolol succinate (TOPROL-XL) 24 hr tablet 50 mg, Daily, Oral    Plan  - BP is controlled, no changes needed to their regimen    BPH (benign prostatic hyperplasia)  Continue Flomax    Interstitial lung disease exacerbation  Patient presents with chest pain and shortness on breath.  Chest x-ray with chronic findings of interstitial lung disease/pulmonary fibrosis    Plan:   Continue prednisone 10 mg for 1 week (per pulmonary note from last week), then 5 mg for 1 week, and then stop  Duo nebs  Continue supplemental oxygen as needed (O2 saturation 88-96%)    Type 2 diabetes mellitus without complication, without long-term current use of insulin  Patient's FSGs are controlled on current medication regimen.  Last A1c reviewed-   Lab Results   Component Value Date    HGBA1C 8.0 (H)  "09/25/2024     Most recent fingerstick glucose reviewed- No results for input(s): "POCTGLUCOSE" in the last 24 hours.  Current correctional scale  Medium  Maintain anti-hyperglycemic dose as follows-   Antihyperglycemics (From admission, onward)      Start     Stop Route Frequency Ordered    10/17/24 1133  insulin aspart U-100 pen 0-10 Units         -- SubQ Before meals & nightly PRN 10/17/24 1035          SSI  Accu-Cheks  Hold Oral hypoglycemics while patient is in the hospital      VTE Risk Mitigation (From admission, onward)           Ordered     enoxaparin injection 40 mg  Daily         10/17/24 1035     IP VTE HIGH RISK PATIENT  Once         10/17/24 1035     Place sequential compression device  Until discontinued         10/17/24 1035                     On 10/17/2024, patient should be placed in hospital observation services under my care in collaboration with Hui White MD.           Jasvir Hunt PA-C  Department of Hospital Medicine  [facility] [place] [facility]  "

## 2024-10-17 NOTE — NURSING
Pt arrive to the unit by wheelchair accompanied by transport Assisted pt to bed. Tele monitoring initiated.  Admit assessment initiated.  Pt is AAOx4.  NO distress noted.

## 2024-10-17 NOTE — ED PROVIDER NOTES
"Encounter Date: 10/17/2024    SCRIBE #1 NOTE: I, Ren Mena, am scribing for, and in the presence of,  Fidencio Sevilla MD. Other sections scribed: HPI, ROS.       History     Chief Complaint   Patient presents with    Chest Pain     Pt BIB EMS complaining midsternal chest " heaviness " starting on this morning after talking morning walk. Pt hx of MI and recently had stent placed. Pt also reports SOB with home O2 at 2L. 325mg asa/ 1 nitro given per ems.      CC: Chest pain    HPI: History is obtained from independent historian: pt and EMS personnel. 73 y.o. M who has a PMHx of CAD, DM, HTN, Kidney stone, and Stroke who presents to the ED for emergent evaluation of acute non-radiating mid-sternal CP that began after walking 2 hours PTA. Pt described the CP as a sharp pain. Pt had associated worsening SOB after walking. Pt's CP resolved after being administered Aspirin, and Nitroglycerin by EMS en route to the ED. Pt reports a Hx of cardiac stent placement at age 52. Pt is on oxygen at home. Pt denies recent CP, or worsening SOB prior to today's episode. Pt denies diaphoresis, leg swelling, vomiting, or Hx of PE or DVT.    The history is provided by the patient and the EMS personnel. No  was used.     Review of patient's allergies indicates:   Allergen Reactions    Dapagliflozin      Other reaction(s): Other (See Comments)    Pcn [penicillins]     Linagliptin Other (See Comments)     "it knocked me down", "it almost killed me"    Lisinopril Other (See Comments)     cough    Pantoprazole Hives     Past Medical History:   Diagnosis Date    CAD (coronary artery disease)     Sees [facility], h/o stent    Diabetes mellitus     Hypertension     Kidney stone     Stroke     age 62     Past Surgical History:   Procedure Laterality Date    CARDIAC CATHETERIZATION      with stent    COLONOSCOPY N/A 03/27/2024    Procedure: COLONOSCOPY;  Surgeon: Ariela Castro MD;  Location: [facility];  Service: " Endoscopy;  Laterality: N/A;    ESOPHAGOGASTRODUODENOSCOPY N/A 03/27/2024    Procedure: EGD (ESOPHAGOGASTRODUODENOSCOPY);  Surgeon: Ariela Castro MD;  Location: [facility] ENDO;  Service: Endoscopy;  Laterality: N/A;    LEFT HEART CATHETERIZATION Left 9/10/2024    Procedure: Left heart cath;  Surgeon: Jemal Drummond MD;  Location: [facility] CATH LAB;  Service: Cardiology;  Laterality: Left;    SHOULDER SURGERY Right      Family History   Problem Relation Name Age of Onset    Cancer Mother      Colon cancer Sister      Esophageal cancer Neg Hx       Social History     Tobacco Use    Smoking status: Never     Passive exposure: Never    Smokeless tobacco: Never   Substance Use Topics    Alcohol use: No    Drug use: Never     Review of Systems   Constitutional:  Negative for chills, diaphoresis and fever.   HENT:  Negative for sore throat.    Respiratory:  Positive for shortness of breath. Negative for cough.    Cardiovascular:  Positive for chest pain (resolved). Negative for leg swelling.   Gastrointestinal:  Negative for abdominal pain, diarrhea, nausea and vomiting.   Genitourinary:  Negative for dysuria.   Musculoskeletal:  Negative for back pain.   Skin:  Negative for rash.   Neurological:  Negative for weakness.   Hematological:  Does not bruise/bleed easily.       Physical Exam     Initial Vitals [10/17/24 0819]   BP Pulse Resp Temp SpO2   100/67 76 18 98.3 °F (36.8 °C) 100 %      MAP       --         Physical Exam    Nursing note and vitals reviewed.  Constitutional: He appears well-developed and well-nourished. He is not diaphoretic. No distress.   HENT:   Head: Normocephalic.   Right Ear: External ear normal.   Left Ear: External ear normal.   Nose: Nose normal. Mouth/Throat: Oropharynx is clear and moist.   Eyes: Conjunctivae and EOM are normal. Pupils are equal, round, and reactive to light. Right eye exhibits no discharge. Left eye exhibits no discharge. No scleral icterus.   Neck: Neck supple. No JVD  present.   Normal range of motion.  Cardiovascular:  Normal rate, regular rhythm, normal heart sounds and intact distal pulses.     Exam reveals no gallop and no friction rub.       No murmur heard.  Pulmonary/Chest: Breath sounds normal. No stridor. No respiratory distress. He has no wheezes. He has no rhonchi. He has no rales. He exhibits no tenderness.   Abdominal: He exhibits no distension and no mass. There is no abdominal tenderness. There is no rebound and no guarding.   Musculoskeletal:         General: No tenderness or edema. Normal range of motion.      Cervical back: Normal range of motion and neck supple.     Neurological: He is alert and oriented to person, place, and time. He has normal strength. No cranial nerve deficit or sensory deficit.   Skin: Skin is warm and dry. No rash noted. No erythema. No pallor.   Psychiatric: He has a normal mood and affect. His behavior is normal. Judgment and thought content normal.         ED Course   Procedures  Labs Reviewed   CBC W/ AUTO DIFFERENTIAL - Abnormal       Result Value    WBC 10.67      RBC 4.94      Hemoglobin 13.9 (*)     Hematocrit 40.9      MCV 83      MCH 28.1      MCHC 34.0      RDW 14.1      Platelets 179      MPV 10.0      Immature Granulocytes 0.5      Gran # (ANC) 7.9 (*)     Immature Grans (Abs) 0.05 (*)     Lymph # 1.6      Mono # 0.8      Eos # 0.5      Baso # 0.01      nRBC 0      Gran % 73.6 (*)     Lymph % 14.6 (*)     Mono % 7.0      Eosinophil % 4.2      Basophil % 0.1      Differential Method Automated     COMPREHENSIVE METABOLIC PANEL - Abnormal    Sodium 136      Potassium 4.7      Chloride 101      CO2 26      Glucose 230 (*)     BUN 15      Creatinine 1.1      Calcium 9.4      Total Protein 6.7      Albumin 3.4 (*)     Total Bilirubin 0.7      Alkaline Phosphatase 54 (*)     AST 16      ALT 27      eGFR >60      Anion Gap 9     TROPONIN I    Troponin I <0.006     B-TYPE NATRIURETIC PEPTIDE    BNP 96       EKG Readings:  (Independently Interpreted)   Initial Reading: No STEMI. Ectopy: No Ectopy.   EKG reviewed and interpreted by me shows normal sinus rhythm at a rate of 99 beats per minute.  The CT, QRS, QTC intervals are within normal limits.  There is normal axis.  There is normal R-wave progression across the precordium.  There are T-wave inversions in the high lateral leads.  No ST elevations or depressions.       Imaging Results              X-Ray Chest AP Portable (Final result)  Result time 10/17/24 09:54:23      Final result by Isiah Barreto MD (10/17/24 09:54:23)                   Impression:      No convincing evidence of acute detrimental change relative prior examination performed 10/11/2024, 16:34 hours.      Electronically signed by: Isiah Barreto  Date:    10/17/2024  Time:    09:54               Narrative:    EXAMINATION:  XR CHEST AP PORTABLE    CLINICAL HISTORY:  Chest pain, unspecified    TECHNIQUE:  Single frontal view of the chest was performed.    COMPARISON:  Chest radiograph performed 10/11/2024, 16:34 hours.    FINDINGS:  Monitoring leads overlie the chest.    Grossly unchanged cardiomediastinal contours.    Coarsened interstitial predominant opacities appear relatively similar to prior examination performed 10/11/2024, 16:34 hours.    No definite acute appearing focal airspace consolidation.    No definite pneumothorax or enlarging pleural effusion.    No acute findings in the visualized abdomen.    Osseous and soft tissue structures appear without definite acute change.                                    X-Rays:   Independently Interpreted Readings:   Other Readings:  Chest x-ray of the chronic interstitial findings without evidence of acute pulmonary edema, pneumothorax, or focal consolidation.    Medications - No data to display  Medical Decision Making  This is the emergent evaluation of a 73-year-old male presents emergency department with chest pain that started after walking with increased  shortness of breath from baseline.  Differential diagnosis at the time of initial evaluation included, but was not limited to: Acute myocardial infarction, acute coronary syndrome, pericarditis, myocarditis, pneumonia, pneumothorax, gastroesophageal reflux disease, pleurisy, costochondritis.  I considered but doubt pulmonary embolus.  Do not suspect aortic dissection.   Per EMS, patient received 324 mg of aspirin as well as nitroglycerin.  After that, patient's symptoms resolved.  Patient was chest pain-free at this time.  He does have history of PCI which he states was approximally 20 years ago.    Did review the patient's chart.  The patient has had recent admissions for chest pain workups as well as hypoxemia in the setting of ILD and CHF.    Patient does not appear to be volume overloaded today.  His labs are reassuring with negative troponin and BNP.  EKG with possible new T-wave inversion in the high lateral leads.  Patient is chest pain-free since receiving nitroglycerin and aspirin EN route with EMS.  Given high risk exertional chest pain with worsening shortness of breath, have discussed the case with the hospital medicine team via secure chat who have accepted the patient.  Patient has been updated.    Amount and/or Complexity of Data Reviewed  Independent Historian: EMS  Labs: ordered. Decision-making details documented in ED Course.     Details: No significant metabolic derangement.  No leukocytosis.  Troponin and BNP within normal limits.  Radiology: ordered. Decision-making details documented in ED Course.  ECG/medicine tests: ordered and independent interpretation performed. Decision-making details documented in ED Course.            Scribe Attestation:   Scribe #1: I performed the above scribed service and the documentation accurately describes the services I performed. I attest to the accuracy of the note.                           I, Fidencio Sevilla MD, personally performed the services described  in this documentation. All medical record entries made by the scribe were at my direction and in my presence. I have reviewed the chart and agree that the record reflects my personal performance and is accurate and complete.      DISCLAIMER: This note was prepared with [employer] voice recognition transcription software. Garbled syntax, mangled pronouns, and other bizarre constructions may be attributed to that software system.      Clinical Impression:  Final diagnoses:  [R07.9] Chest pain          ED Disposition Condition    Observation Stable                Fidencio Sevilla Jr., MD  10/17/24 0162

## 2024-10-17 NOTE — ASSESSMENT & PLAN NOTE
Patients blood pressure range in the last 24 hours was: BP  Min: 95/62  Max: 144/88.The patient's inpatient anti-hypertensive regimen is listed below:  Current Antihypertensives  amLODIPine tablet 10 mg, Daily, Oral  furosemide tablet 20 mg, Daily, Oral  metoprolol succinate (TOPROL-XL) 24 hr tablet 50 mg, Daily, Oral    Plan  - BP is controlled, no changes needed to their regimen

## 2024-10-17 NOTE — PLAN OF CARE
Case Management Assessment     PCP: Wilfredo De Souza   Pharmacy: [facility]    Patient Arrived From: home   Existing Help at Home: none    Barriers to Discharge: none    Discharge Plan:    A. Home with family   B. Home with family     10/17/24 1149   Discharge Planning   Assessment Type Discharge Planning Brief Assessment   Resource/Environmental Concerns none   Support Systems Children   Equipment Currently Used at Home oxygen;nebulizer   Current Living Arrangements home   Patient/Family Anticipates Transition to home   Patient/Family Anticipated Services at Transition none   DME Needed Upon Discharge  none   Discharge Plan A Home   Discharge Plan B Home

## 2024-10-17 NOTE — ASSESSMENT & PLAN NOTE
Patient with Hypoxic Respiratory failure which is Acute on chronic.  he is on home oxygen at 2 LPM. Supplemental oxygen was provided and noted-      Continue 2 L oxygen  Targeted sats 88-96% with supplemental oxygen

## 2024-10-17 NOTE — ED TRIAGE NOTES
Pt to ED via EMS c/o chest pain and SOB after taking daily morning walk outside. Pt uses 2 L's home oxygen. EMS reports administering 1 Nitro and 325 mg of ASA which pt states help his chest pain to subside with exception of slight chest heaviness. O2 sats 100% on 2l's.

## 2024-10-17 NOTE — SUBJECTIVE & OBJECTIVE
"Past Medical History:   Diagnosis Date    CAD (coronary artery disease)     Sees [facility], h/o stent    Diabetes mellitus     Hypertension     Kidney stone     Stroke     age 62       Past Surgical History:   Procedure Laterality Date    CARDIAC CATHETERIZATION      with stent    COLONOSCOPY N/A 03/27/2024    Procedure: COLONOSCOPY;  Surgeon: Ariela Castro MD;  Location: [facility] ENDO;  Service: Endoscopy;  Laterality: N/A;    ESOPHAGOGASTRODUODENOSCOPY N/A 03/27/2024    Procedure: EGD (ESOPHAGOGASTRODUODENOSCOPY);  Surgeon: Ariela Castro MD;  Location: [facility] ENDO;  Service: Endoscopy;  Laterality: N/A;    LEFT HEART CATHETERIZATION Left 9/10/2024    Procedure: Left heart cath;  Surgeon: Jemal Drummond MD;  Location: [facility] CATH LAB;  Service: Cardiology;  Laterality: Left;    SHOULDER SURGERY Right        Review of patient's allergies indicates:   Allergen Reactions    Dapagliflozin      Other reaction(s): Other (See Comments)    Pcn [penicillins]     Linagliptin Other (See Comments)     "it knocked me down", "it almost killed me"    Lisinopril Other (See Comments)     cough    Pantoprazole Hives       No current facility-administered medications on file prior to encounter.     Current Outpatient Medications on File Prior to Encounter   Medication Sig    alpha-D-galactosidase (BEANO ORAL) Take 1 tablet by mouth 2 (two) times a day.    amLODIPine (NORVASC) 10 MG tablet Take 1 tablet (10 mg total) by mouth once daily.    aspirin 81 MG Chew Take 81 mg by mouth once daily.    atorvastatin (LIPITOR) 40 MG tablet Take 1 tablet (40 mg total) by mouth every evening.    cholecalciferol, vitamin D3, (VITAMIN D3) 50 mcg (2,000 unit) Cap capsule Take 1 capsule by mouth once daily.    furosemide (LASIX) 20 MG tablet Take 1 tablet (20 mg total) by mouth once daily.    glimepiride (AMARYL) 2 MG tablet Take 1 tablet (2 mg total) by mouth 2 (two) times a day. Take with meals    metFORMIN (GLUCOPHAGE) 1000 MG tablet Take " 1 tablet (1,000 mg total) by mouth 2 (two) times daily with meals.    metoprolol succinate (TOPROL-XL) 50 MG 24 hr tablet Take 1 tablet (50 mg total) by mouth once daily.    mirabegron (MYRBETRIQ) 25 mg Tb24 ER tablet Take 1 tablet (25 mg total) by mouth once daily.    mycophenolate (CELLCEPT) 250 mg Cap Take 2 capsules (500 mg total) by mouth 2 (two) times daily.    sacubitriL-valsartan (ENTRESTO) 24-26 mg per tablet Take 1 tablet by mouth 2 (two) times daily.    tamsulosin (FLOMAX) 0.4 mg Cap Take 1 capsule (0.4 mg total) by mouth once daily.    albuterol-ipratropium (DUO-NEB) 2.5 mg-0.5 mg/3 mL nebulizer solution Take 3 mLs by nebulization every 6 (six) hours as needed for Wheezing. Rescue    azithromycin (Z-ARA) 250 MG tablet Take 2 tablets by mouth on day 1; Take 1 tablet by mouth on days 2-5    methylPREDNISolone (MEDROL DOSEPACK) 4 mg tablet use as directed    omeprazole (PRILOSEC) 40 MG capsule Take 40 mg by mouth every morning.    sucralfate (CARAFATE) 1 gram tablet Take 1 tablet (1 g total) by mouth 4 (four) times daily before meals and nightly.     Family History       Problem Relation (Age of Onset)    Cancer Mother    Colon cancer Sister          Tobacco Use    Smoking status: Never     Passive exposure: Never    Smokeless tobacco: Never   Substance and Sexual Activity    Alcohol use: No    Drug use: Never    Sexual activity: Not Currently     Review of Systems   Constitutional: Negative.    HENT: Negative.     Respiratory:  Positive for cough and shortness of breath.    Cardiovascular:  Positive for chest pain (Still endorsing minimal chest heaviness).   Gastrointestinal: Negative.    Genitourinary: Negative.    Musculoskeletal: Negative.    Neurological: Negative.    Psychiatric/Behavioral: Negative.       Objective:     Vital Signs (Most Recent):  Temp: 98.3 °F (36.8 °C) (10/17/24 0819)  Pulse: 79 (10/17/24 1233)  Resp: 19 (10/17/24 1232)  BP: (!) 144/88 (10/17/24 1232)  SpO2: 100 % (10/17/24 1232)  Vital Signs (24h Range):  Temp:  [98.3 °F (36.8 °C)] 98.3 °F (36.8 °C)  Pulse:  [75-89] 79  Resp:  [16-25] 19  SpO2:  [97 %-100 %] 100 %  BP: ()/(62-88) 144/88     Weight: 68 kg (150 lb)  Body mass index is 22.81 kg/m².     Physical Exam  Constitutional:       General: He is not in acute distress.     Appearance: Normal appearance.   HENT:      Head: Normocephalic and atraumatic.      Mouth/Throat:      Mouth: Mucous membranes are moist.   Eyes:      Extraocular Movements: Extraocular movements intact.   Cardiovascular:      Rate and Rhythm: Normal rate and regular rhythm.      Pulses: Normal pulses.      Comments: EKG in ED NSR and T-wave inversions in high lateral leads, no STEMI  Pulmonary:      Effort: Pulmonary effort is normal.      Breath sounds: Normal breath sounds.      Comments: On 2 L oxygen via [place]  Abdominal:      General: Abdomen is flat.   Musculoskeletal:      Right lower leg: No edema.      Left lower leg: No edema.   Skin:     General: Skin is warm.   Neurological:      General: No focal deficit present.      Mental Status: He is alert and oriented to person, place, and time. Mental status is at baseline.   Psychiatric:         Mood and Affect: Mood normal.         Behavior: Behavior normal.         Thought Content: Thought content normal.                Significant Labs: All pertinent labs within the past 24 hours have been reviewed.    Significant Imaging: I have reviewed all pertinent imaging results/findings within the past 24 hours.  Imaging Results              X-Ray Chest AP Portable (Final result)  Result time 10/17/24 09:54:23      Final result by Isiah Barreto MD (10/17/24 09:54:23)                   Impression:      No convincing evidence of acute detrimental change relative prior examination performed 10/11/2024, 16:34 hours.      Electronically signed by: Isiah Barreto  Date:    10/17/2024  Time:    09:54               Narrative:    EXAMINATION:  XR CHEST AP  PORTABLE    CLINICAL HISTORY:  Chest pain, unspecified    TECHNIQUE:  Single frontal view of the chest was performed.    COMPARISON:  Chest radiograph performed 10/11/2024, 16:34 hours.    FINDINGS:  Monitoring leads overlie the chest.    Grossly unchanged cardiomediastinal contours.    Coarsened interstitial predominant opacities appear relatively similar to prior examination performed 10/11/2024, 16:34 hours.    No definite acute appearing focal airspace consolidation.    No definite pneumothorax or enlarging pleural effusion.    No acute findings in the visualized abdomen.    Osseous and soft tissue structures appear without definite acute change.

## 2024-10-17 NOTE — TELEPHONE ENCOUNTER
"----- Message from Areli sent at 10/17/2024  7:14 AM CDT -----  Type: Patient Call Back    Who called: self     What is the request in detail:Per SymptomScreen screening, patient has been instructed to call 911 immediately. Please reach out directly to patient with any further directives."##    Can the clinic reply by [facility]?no     Would the patient rather a call back or a response via [facility]?  call    Best call back number: [phone] (home)      Additional Information:  "

## 2024-10-17 NOTE — NURSING
[facility]  Nurses Note -- 4 Eyes      10/17/2024       Skin assessed on: Admit      [x] No Pressure Injuries Present    [x]Prevention Measures Documented    [] Yes LDA  for Pressure Injury Previously documented     [] Yes New Pressure Injury Discovered   [] LDA for New Pressure Injury Added      Attending RN:  Marta Bryant RN     Second RN:  Shelbie Brito RN

## 2024-10-18 VITALS
WEIGHT: 149.94 LBS | HEART RATE: 83 BPM | BODY MASS INDEX: 22.72 KG/M2 | RESPIRATION RATE: 18 BRPM | TEMPERATURE: 97 F | DIASTOLIC BLOOD PRESSURE: 57 MMHG | HEIGHT: 68 IN | OXYGEN SATURATION: 94 % | SYSTOLIC BLOOD PRESSURE: 93 MMHG

## 2024-10-18 LAB
ALBUMIN SERPL BCP-MCNC: 3.2 G/DL (ref 3.5–5.2)
ALP SERPL-CCNC: 52 U/L (ref 40–150)
ALT SERPL W/O P-5'-P-CCNC: 23 U/L (ref 10–44)
ANION GAP SERPL CALC-SCNC: 9 MMOL/L (ref 8–16)
AST SERPL-CCNC: 16 U/L (ref 10–40)
BASOPHILS # BLD AUTO: 0.02 K/UL (ref 0–0.2)
BASOPHILS NFR BLD: 0.2 % (ref 0–1.9)
BILIRUB SERPL-MCNC: 0.6 MG/DL (ref 0.1–1)
BUN SERPL-MCNC: 16 MG/DL (ref 8–23)
CALCIUM SERPL-MCNC: 9.1 MG/DL (ref 8.7–10.5)
CHLORIDE SERPL-SCNC: 103 MMOL/L (ref 95–110)
CHOLEST SERPL-MCNC: 103 MG/DL (ref 120–199)
CHOLEST/HDLC SERPL: 3.2 {RATIO} (ref 2–5)
CO2 SERPL-SCNC: 24 MMOL/L (ref 23–29)
CREAT SERPL-MCNC: 0.9 MG/DL (ref 0.5–1.4)
DIFFERENTIAL METHOD BLD: ABNORMAL
EOSINOPHIL # BLD AUTO: 0.4 K/UL (ref 0–0.5)
EOSINOPHIL NFR BLD: 5.2 % (ref 0–8)
ERYTHROCYTE [DISTWIDTH] IN BLOOD BY AUTOMATED COUNT: 13.7 % (ref 11.5–14.5)
EST. GFR  (NO RACE VARIABLE): >60 ML/MIN/1.73 M^2
GLUCOSE SERPL-MCNC: 223 MG/DL (ref 70–110)
HCT VFR BLD AUTO: 39.7 % (ref 40–54)
HDLC SERPL-MCNC: 32 MG/DL (ref 40–75)
HDLC SERPL: 31.1 % (ref 20–50)
HGB BLD-MCNC: 13.6 G/DL (ref 14–18)
IMM GRANULOCYTES # BLD AUTO: 0.03 K/UL (ref 0–0.04)
IMM GRANULOCYTES NFR BLD AUTO: 0.4 % (ref 0–0.5)
LDLC SERPL CALC-MCNC: 55.4 MG/DL (ref 63–159)
LYMPHOCYTES # BLD AUTO: 1.8 K/UL (ref 1–4.8)
LYMPHOCYTES NFR BLD: 21.7 % (ref 18–48)
MAGNESIUM SERPL-MCNC: 1.6 MG/DL (ref 1.6–2.6)
MCH RBC QN AUTO: 28.3 PG (ref 27–31)
MCHC RBC AUTO-ENTMCNC: 34.3 G/DL (ref 32–36)
MCV RBC AUTO: 83 FL (ref 82–98)
MONOCYTES # BLD AUTO: 0.6 K/UL (ref 0.3–1)
MONOCYTES NFR BLD: 7.4 % (ref 4–15)
NEUTROPHILS # BLD AUTO: 5.4 K/UL (ref 1.8–7.7)
NEUTROPHILS NFR BLD: 65.1 % (ref 38–73)
NONHDLC SERPL-MCNC: 71 MG/DL
NRBC BLD-RTO: 0 /100 WBC
PHOSPHATE SERPL-MCNC: 3.3 MG/DL (ref 2.7–4.5)
PLATELET # BLD AUTO: 183 K/UL (ref 150–450)
PMV BLD AUTO: 10.3 FL (ref 9.2–12.9)
POCT GLUCOSE: 214 MG/DL (ref 70–110)
POCT GLUCOSE: 281 MG/DL (ref 70–110)
POCT GLUCOSE: 393 MG/DL (ref 70–110)
POTASSIUM SERPL-SCNC: 3.9 MMOL/L (ref 3.5–5.1)
PROT SERPL-MCNC: 6.3 G/DL (ref 6–8.4)
RBC # BLD AUTO: 4.8 M/UL (ref 4.6–6.2)
SODIUM SERPL-SCNC: 136 MMOL/L (ref 136–145)
TRIGL SERPL-MCNC: 78 MG/DL (ref 30–150)
WBC # BLD AUTO: 8.22 K/UL (ref 3.9–12.7)

## 2024-10-18 PROCEDURE — 84100 ASSAY OF PHOSPHORUS: CPT

## 2024-10-18 PROCEDURE — 96372 THER/PROPH/DIAG INJ SC/IM: CPT

## 2024-10-18 PROCEDURE — 25000003 PHARM REV CODE 250

## 2024-10-18 PROCEDURE — 36415 COLL VENOUS BLD VENIPUNCTURE: CPT

## 2024-10-18 PROCEDURE — 80053 COMPREHEN METABOLIC PANEL: CPT

## 2024-10-18 PROCEDURE — 94761 N-INVAS EAR/PLS OXIMETRY MLT: CPT

## 2024-10-18 PROCEDURE — 63600175 PHARM REV CODE 636 W HCPCS

## 2024-10-18 PROCEDURE — 25000003 PHARM REV CODE 250: Performed by: INTERNAL MEDICINE

## 2024-10-18 PROCEDURE — 80061 LIPID PANEL: CPT

## 2024-10-18 PROCEDURE — 85025 COMPLETE CBC W/AUTO DIFF WBC: CPT

## 2024-10-18 PROCEDURE — 83735 ASSAY OF MAGNESIUM: CPT

## 2024-10-18 PROCEDURE — G0378 HOSPITAL OBSERVATION PER HR: HCPCS

## 2024-10-18 RX ORDER — ATORVASTATIN CALCIUM 40 MG/1
40 TABLET, FILM COATED ORAL NIGHTLY
Status: DISCONTINUED | OUTPATIENT
Start: 2024-10-18 | End: 2024-10-18 | Stop reason: HOSPADM

## 2024-10-18 RX ORDER — ISOSORBIDE MONONITRATE 30 MG/1
30 TABLET, EXTENDED RELEASE ORAL DAILY
Qty: 30 TABLET | Refills: 2 | Status: SHIPPED | OUTPATIENT
Start: 2024-10-18 | End: 2025-01-16

## 2024-10-18 RX ADMIN — AMLODIPINE BESYLATE 10 MG: 5 TABLET ORAL at 09:10

## 2024-10-18 RX ADMIN — SACUBITRIL AND VALSARTAN 1 TABLET: 24; 26 TABLET, FILM COATED ORAL at 09:10

## 2024-10-18 RX ADMIN — ISOSORBIDE MONONITRATE 30 MG: 30 TABLET, EXTENDED RELEASE ORAL at 09:10

## 2024-10-18 RX ADMIN — FUROSEMIDE 20 MG: 20 TABLET ORAL at 09:10

## 2024-10-18 RX ADMIN — ASPIRIN 81 MG CHEWABLE TABLET 81 MG: 81 TABLET CHEWABLE at 09:10

## 2024-10-18 RX ADMIN — MYCOPHENOLATE MOFETIL 500 MG: 250 CAPSULE ORAL at 09:10

## 2024-10-18 RX ADMIN — METOPROLOL SUCCINATE 50 MG: 50 TABLET, EXTENDED RELEASE ORAL at 09:10

## 2024-10-18 RX ADMIN — ACETAMINOPHEN 650 MG: 325 TABLET ORAL at 01:10

## 2024-10-18 RX ADMIN — INSULIN ASPART 6 UNITS: 100 INJECTION, SOLUTION INTRAVENOUS; SUBCUTANEOUS at 11:10

## 2024-10-18 RX ADMIN — Medication 6 MG: at 01:10

## 2024-10-18 RX ADMIN — INSULIN HUMAN 10 UNITS: 100 INJECTION, SOLUTION PARENTERAL at 09:10

## 2024-10-18 RX ADMIN — TAMSULOSIN HYDROCHLORIDE 0.4 MG: 0.4 CAPSULE ORAL at 09:10

## 2024-10-18 RX ADMIN — ACETAMINOPHEN 650 MG: 325 TABLET ORAL at 09:10

## 2024-10-18 NOTE — ASSESSMENT & PLAN NOTE
Now CP free, trops neg.  Recent angiogram revealed nonobstructive CAD.    Cont med rx  Consider GI eval as outpatient.

## 2024-10-18 NOTE — CONSULTS
[facility] - Telemetry  Cardiology  Consult Note    Patient Name: Emmanuel Grant  MRN: [id number]  Admission Date: 10/17/2024  Hospital Length of Stay: 0 days  Code Status: Full Code   Attending Provider: Hui White,*   Consulting Provider: Ting Barry MD  Primary Care Physician: Wilfredo De Souza MD  Principal Problem:Coronary artery disease involving native coronary artery of native heart with angina pectoris    Patient information was obtained from patient and ER records.     Inpatient consult to Cardiology  Consult performed by: Ting Barry MD  Consult ordered by: Fidencio Sevilla Jr., MD        Subjective:     Chief Complaint:  cp     HPI:   Patient is a pleasant 73-year-old man with past medical history significant for interstitial lung disease, coronary artery disease, recent stress test which showed fixed scar with gricelda-infarct ischemia, follows with Dr. Hawkins in his clinic.  Came in with some substernal chest pressure and pain.  Now resolved.  Currently chest pain-free.  Denies orthopnea PND swelling of feet.  Resting comfortably in bed.    Results for orders placed during the hospital encounter of 10/17/24    Echo    Interpretation Summary    Left Ventricle: Regional wall motion abnormalities present. There is normal systolic function with a visually estimated ejection fraction of 55 %. Grade I diastolic dysfunction. federico septal hypokinesis    Right Ventricle: Normal right ventricular cavity size. Systolic function is normal.    Left Atrium: Left atrium is mildly dilated.    IVC/SVC: Normal venous pressure at 3 mmHg.          HPI: Emmanuel Grant is a 73 y.o. with a pmh of ILD (on home O2), CAD/MI/PCI, HFrEF with EF: 40%, hypertension, DM 2, hyperlipidemia, CVA, and BPH presents to the hospital with complaints of midsternal chest pain described as heaviness/burning after a morning walk today. Patient also complains of associated exertional dyspnea. Per ED note, patient was  administered SL nitroglycerin and 324 mg aspirin en route with improvement in pain.  Patient with a history of interstitial lung disease on chronic oxygen at home (2 L). Patient denies any other alleviating or exacerbating factors. Patient denies headache, blurry vision, diaphoresis, nausea, vomiting, diarrhea, melena, hematemesis, or any other associated symptoms.     In the ED: Patient afebrile without leukocytosis, hemodynamically stable on presentation, ALP 54, normal BNP 96, initial troponin normal, chest x-ray with no acute findings.  EKG shows NSR with T-wave inversions in high lateral leads. Case discussed with ED provider and patient will be placed under observation for further medical management.      Results for orders placed or performed during the hospital encounter of 10/11/24   EKG 12-lead    Collection Time: 10/11/24  6:47 PM   Result Value Ref Range    QRS Duration 96 ms    OHS QTC Calculation 438 ms    Narrative    Test Reason : R06.02,    Vent. Rate : 088 BPM     Atrial Rate : 088 BPM     P-R Int : 186 ms          QRS Dur : 096 ms      QT Int : 362 ms       P-R-T Axes : 040 043 034 degrees     QTc Int : 438 ms    Normal sinus rhythm  Nonspecific ST and T wave abnormality  Abnormal ECG  When compared with ECG of 25-SEP-2024 11:59,  Significant changes have occurred  Confirmed by Asha MIRANDA, Ting BULLARD ([id number]) on 10/12/2024 4:38:32 PM    Referred By: MARCUS   SELF           Confirmed By:Ting Barry MD       Results for orders placed during the hospital encounter of 10/17/24    Echo    Interpretation Summary    Left Ventricle: Regional wall motion abnormalities present. There is normal systolic function with a visually estimated ejection fraction of 55 %. Grade I diastolic dysfunction. federico septal hypokinesis    Right Ventricle: Normal right ventricular cavity size. Systolic function is normal.    Left Atrium: Left atrium is mildly dilated.    IVC/SVC: Normal venous pressure at 3 mmHg.      Results  for orders placed during the hospital encounter of 06/16/24    Nuclear Stress - Cardiology Interpreted    Interpretation Summary    Abnormal myocardial perfusion scan.    There is a severe intensity, medium sized, mostly fixed perfusion abnormality with minimal reversibilty in the mid to apical anterior and apical wall(s) in the typical distribution of the LAD territory.  This is conssistent with scar and minimal periinfarct ischemia.    There are no other significant perfusion abnormalities.    The gated perfusion images showed an ejection fraction of 40% post stress.    There is anteroapical wall akinesis during stress.      Results for orders placed during the hospital encounter of 09/10/24    Cardiac catheterization    Conclusion    There was non-obstructive coronary artery disease. Patnet LAD stent, Small D1 - , Cx/RCA with luminal irregularities    The post-procedure left ventricular end diastolic pressure was 17.    The procedure log was documented by Documenter: Samira Perez RN and verified by Jemal Drummond MD.    Date: 9/10/2024  Time: 1:37 PM              Cath 9/10/24    There was non-obstructive coronary artery disease. Patent LAD stent, Small D1 -  (jailed), Cx/RCA with luminal irregularities    The post-procedure left ventricular end diastolic pressure was 17.     Echo 7/2/24 (Ao root 4.0cm)    Left Ventricle: The left ventricle is normal in size. Normal wall thickness. Regional wall motion abnormalities and Global hypokinesis present. See diagram for wall motion findings. There is mildly reduced systolic function with a visually estimated ejection fraction of 40 - 45%. Biplane (2D) method of discs ejection fraction is 43%. Global longitudinal strain is -13.0%.    Right Ventricle: Normal right ventricular cavity size. Wall thickness is normal. Systolic function is normal.    Aortic Valve: The aortic valve is a trileaflet valve. There is mild aortic valve sclerosis.    Mitral Valve: There is  "mild bileaflet sclerosis. There is mild mitral annular calcification present.    Pulmonic Valve: There is mild regurgitation.    Pulmonary Artery: The estimated pulmonary artery systolic pressure is 24 mmHg.    IVC/SVC: Normal venous pressure at 3 mmHg.     L MPI 6/17/24     Abnormal myocardial perfusion scan.    There is a severe intensity, medium sized, mostly fixed perfusion abnormality with minimal reversibilty in the mid to apical anterior and apical wall(s) in the typical distribution of the LAD territory.  This is conssistent with scar and minimal periinfarct ischemia.    There are no other significant perfusion abnormalities.    The gated perfusion images showed an ejection fraction of 40% post stress.    There is anteroapical wall akinesis during stress.                      Past Medical History:   Diagnosis Date    CAD (coronary artery disease)     Sees [facility], h/o stent    Diabetes mellitus     Hypertension     Kidney stone     Stroke     age 62       Past Surgical History:   Procedure Laterality Date    CARDIAC CATHETERIZATION      with stent    COLONOSCOPY N/A 03/27/2024    Procedure: COLONOSCOPY;  Surgeon: Ariela Castro MD;  Location: [facility] ENDO;  Service: Endoscopy;  Laterality: N/A;    ESOPHAGOGASTRODUODENOSCOPY N/A 03/27/2024    Procedure: EGD (ESOPHAGOGASTRODUODENOSCOPY);  Surgeon: Ariela Castro MD;  Location: [facility] ENDO;  Service: Endoscopy;  Laterality: N/A;    LEFT HEART CATHETERIZATION Left 9/10/2024    Procedure: Left heart cath;  Surgeon: Jemal Drummond MD;  Location: [facility] CATH LAB;  Service: Cardiology;  Laterality: Left;    SHOULDER SURGERY Right        Review of patient's allergies indicates:   Allergen Reactions    Dapagliflozin      Other reaction(s): Other (See Comments)    Pcn [penicillins]     Linagliptin Other (See Comments)     "it knocked me down", "it almost killed me"    Lisinopril Other (See Comments)     cough    Pantoprazole Hives       No current " facility-administered medications on file prior to encounter.     Current Outpatient Medications on File Prior to Encounter   Medication Sig    alpha-D-galactosidase (BEANO ORAL) Take 1 tablet by mouth 2 (two) times a day.    amLODIPine (NORVASC) 10 MG tablet Take 1 tablet (10 mg total) by mouth once daily.    aspirin 81 MG Chew Take 81 mg by mouth once daily.    atorvastatin (LIPITOR) 40 MG tablet Take 1 tablet (40 mg total) by mouth every evening.    cholecalciferol, vitamin D3, (VITAMIN D3) 50 mcg (2,000 unit) Cap capsule Take 1 capsule by mouth once daily.    furosemide (LASIX) 20 MG tablet Take 1 tablet (20 mg total) by mouth once daily.    glimepiride (AMARYL) 2 MG tablet Take 1 tablet (2 mg total) by mouth 2 (two) times a day. Take with meals    metFORMIN (GLUCOPHAGE) 1000 MG tablet Take 1 tablet (1,000 mg total) by mouth 2 (two) times daily with meals.    metoprolol succinate (TOPROL-XL) 50 MG 24 hr tablet Take 1 tablet (50 mg total) by mouth once daily.    mirabegron (MYRBETRIQ) 25 mg Tb24 ER tablet Take 1 tablet (25 mg total) by mouth once daily.    mycophenolate (CELLCEPT) 250 mg Cap Take 2 capsules (500 mg total) by mouth 2 (two) times daily.    sacubitriL-valsartan (ENTRESTO) 24-26 mg per tablet Take 1 tablet by mouth 2 (two) times daily.    tamsulosin (FLOMAX) 0.4 mg Cap Take 1 capsule (0.4 mg total) by mouth once daily.    albuterol-ipratropium (DUO-NEB) 2.5 mg-0.5 mg/3 mL nebulizer solution Take 3 mLs by nebulization every 6 (six) hours as needed for Wheezing. Rescue    azithromycin (Z-ARA) 250 MG tablet Take 2 tablets by mouth on day 1; Take 1 tablet by mouth on days 2-5    methylPREDNISolone (MEDROL DOSEPACK) 4 mg tablet use as directed    omeprazole (PRILOSEC) 40 MG capsule Take 40 mg by mouth every morning.    sucralfate (CARAFATE) 1 gram tablet Take 1 tablet (1 g total) by mouth 4 (four) times daily before meals and nightly.     Family History       Problem Relation (Age of Onset)    Cancer  Mother    Colon cancer Sister          Tobacco Use    Smoking status: Never     Passive exposure: Never    Smokeless tobacco: Never   Substance and Sexual Activity    Alcohol use: No    Drug use: Never    Sexual activity: Not Currently     Review of Systems   Constitutional: Negative.   HENT: Negative.     Eyes: Negative.    Cardiovascular:  Positive for chest pain.   Respiratory: Negative.     Endocrine: Negative.    Hematologic/Lymphatic: Negative.    Skin: Negative.    Musculoskeletal: Negative.    Gastrointestinal: Negative.    Genitourinary: Negative.    Neurological: Negative.    Psychiatric/Behavioral: Negative.     Allergic/Immunologic: Negative.      Objective:     Vital Signs (Most Recent):  Temp: 97.8 °F (36.6 °C) (10/17/24 1645)  Pulse: 90 (10/17/24 1849)  Resp: 19 (10/17/24 1645)  BP: (!) 150/80 (10/17/24 1645)  SpO2: (!) 93 % (10/17/24 1645) Vital Signs (24h Range):  Temp:  [97.8 °F (36.6 °C)-98.3 °F (36.8 °C)] 97.8 °F (36.6 °C)  Pulse:  [75-90] 90  Resp:  [16-25] 19  SpO2:  [93 %-100 %] 93 %  BP: ()/(62-88) 150/80     Weight: 68 kg (149 lb 14.6 oz)  Body mass index is 22.79 kg/m².    SpO2: (!) 93 %       No intake or output data in the 24 hours ending 10/1952    Lines/Drains/Airways       Peripheral Intravenous Line  Duration                  Peripheral IV - Single Lumen 10/17/24 0813 20 G Left Antecubital <1 day         Peripheral IV - Single Lumen 10/17/24 0834 20 G Right Antecubital <1 day                     Physical Exam  Vitals reviewed.   Constitutional:       Appearance: He is well-developed.   HENT:      Head: Normocephalic.   Eyes:      Conjunctiva/sclera: Conjunctivae normal.      Pupils: Pupils are equal, round, and reactive to light.   Cardiovascular:      Rate and Rhythm: Normal rate and regular rhythm.      Heart sounds: Normal heart sounds.   Pulmonary:      Effort: Pulmonary effort is normal.      Breath sounds: Normal breath sounds.   Abdominal:      General: Bowel sounds  are normal.      Palpations: Abdomen is soft.   Musculoskeletal:      Cervical back: Normal range of motion and neck supple.   Skin:     General: Skin is warm.   Neurological:      Mental Status: He is alert and oriented to person, place, and time.          Significant Labs:     DATA:     Laboratory:  CBC:  Recent Labs   Lab 10/11/24  1941 10/12/24  0411 10/17/24  0845   WBC 9.37 7.09 10.67   Hemoglobin 13.8 L 13.9 L 13.9 L   Hematocrit 40.6 42.1 40.9   Platelets 128 L 139 L 179       CHEMISTRIES:  Recent Labs   Lab 09/26/22  1345 11/21/22  0926 02/28/24  1625 09/28/24  0632 10/10/24  1014 10/11/24  1941 10/12/24  0411 10/17/24  0845   Glucose  --   --    < > 102   < > 145 H 276 H 230 H   Sodium 139 139   < > 139   < > 138 137 136   Potassium 3.9 4.5   < > 3.2 L   < > 3.2 L 4.5 4.7   BUN 18.1 H 14.4   < > 22   < > 14 16 15   Creatinine 1.19 1.12   < > 0.8   < > 1.0 1.0 1.1   eGFR  72 L 71 L  --   --   --   --   --   --    Calcium 9.2 9.2   < > 9.1   < > 8.6 L 8.9 9.4   Magnesium  --   --    < > 1.9  --  1.5 L 2.1  --     < > = values in this interval not displayed.       CARDIAC BIOMARKERS:  Recent Labs   Lab 07/20/23  1213 02/28/24  1625 10/17/24  0845 10/17/24  1222 10/17/24  1816     --   --   --   --    Troponin I  --    < > <0.006 <0.006 0.015    < > = values in this interval not displayed.       COAGS:  Recent Labs   Lab 07/07/23  1212   INR 1.2       LIPIDS/LFTS:  Recent Labs   Lab 05/07/24  0752 06/16/24  1212 10/11/24  1941 10/12/24  0411 10/17/24  0845   Cholesterol 109 L  --   --   --   --    Triglycerides 60  --   --   --   --    HDL 39 L  --   --   --   --    LDL Cholesterol 58.0 L  --   --   --   --    Non-HDL Cholesterol 70  --   --   --   --    AST 18   < > 20 18 16   ALT 34   < > 27 26 27    < > = values in this interval not displayed.       Hemoglobin A1C   Date Value Ref Range Status   09/25/2024 8.0 (H) 4.0 - 5.6 % Final     Comment:     ADA Screening Guidelines:  5.7-6.4%   Consistent with prediabetes  >or=6.5%  Consistent with diabetes    High levels of fetal hemoglobin interfere with the HbA1C  assay. Heterozygous hemoglobin variants (HbS, HgC, etc)do  not significantly interfere with this assay.   However, presence of multiple variants may affect accuracy.     08/05/2024 8.3 (H) 4.0 - 5.6 % Final     Comment:     ADA Screening Guidelines:  5.7-6.4%  Consistent with prediabetes  >or=6.5%  Consistent with diabetes    High levels of fetal hemoglobin interfere with the HbA1C  assay. Heterozygous hemoglobin variants (HbS, HgC, etc)do  not significantly interfere with this assay.   However, presence of multiple variants may affect accuracy.     06/16/2024 7.4 (H) 4.0 - 5.6 % Final     Comment:     ADA Screening Guidelines:  5.7-6.4%  Consistent with prediabetes  >or=6.5%  Consistent with diabetes    High levels of fetal hemoglobin interfere with the HbA1C  assay. Heterozygous hemoglobin variants (HbS, HgC, etc)do  not significantly interfere with this assay.   However, presence of multiple variants may affect accuracy.         TSH        The ASCVD Risk score (Wayne FLORES, et al., 2019) failed to calculate for the following reasons:    Risk score cannot be calculated because patient has a medical history suggesting prior/existing ASCVD       BNP    Lab Results   Component Value Date/Time    BNP 96 10/17/2024 08:45 AM    BNP 67 10/11/2024 07:41 PM     (H) 09/25/2024 11:59 AM    BNP 56 09/24/2024 06:09 PM    BNP 84 09/10/2024 08:26 AM    BNP 51 07/15/2024 05:48 PM    BNP 72 07/06/2024 10:48 AM    BNP 45 06/29/2024 10:09 AM     (H) 06/16/2024 12:12 PM    BNP 65 05/01/2024 09:40 PM    BNP 51 02/28/2024 04:25 PM            ECHO    Results for orders placed during the hospital encounter of 10/17/24    Echo    Interpretation Summary    Left Ventricle: Regional wall motion abnormalities present. There is normal systolic function with a visually estimated ejection fraction of 55 %.  Grade I diastolic dysfunction. federico septal hypokinesis    Right Ventricle: Normal right ventricular cavity size. Systolic function is normal.    Left Atrium: Left atrium is mildly dilated.    IVC/SVC: Normal venous pressure at 3 mmHg.      STRESS TEST    Results for orders placed during the hospital encounter of 06/16/24    Nuclear Stress - Cardiology Interpreted    Interpretation Summary    Abnormal myocardial perfusion scan.    There is a severe intensity, medium sized, mostly fixed perfusion abnormality with minimal reversibilty in the mid to apical anterior and apical wall(s) in the typical distribution of the LAD territory.  This is conssistent with scar and minimal periinfarct ischemia.    There are no other significant perfusion abnormalities.    The gated perfusion images showed an ejection fraction of 40% post stress.    There is anteroapical wall akinesis during stress.        CATH    Results for orders placed during the hospital encounter of 09/10/24    Cardiac catheterization    Conclusion    There was non-obstructive coronary artery disease. Patnet LAD stent, Small D1 - , Cx/RCA with luminal irregularities    The post-procedure left ventricular end diastolic pressure was 17.    The procedure log was documented by Documenter: Samira Perez RN and verified by Jemal Drummond MD.    Date: 9/10/2024  Time: 1:37 PM      Assessment and Plan:     * Coronary artery disease involving native coronary artery of native heart with angina pectoris  Patient with chest pain.  Recent angiogram revealed nonobstructive CAD.  Troponins have been negative.  Will intensify medical regimen.    Chest pain  Patient with chest pain.  Recent stress test showed predominantly fixed anterior wall defect with gricelda-infarct ischemia.  Subsequent angiogram revealed nonobstructive CAD.  Now chest pain-free.  Will intensify medical regimen.    Hyperlipidemia  Statins      Type 2 diabetes mellitus without complication, without  long-term current use of insulin  Patient's FSGs are uncontrolled due to hyperglycemia on current medication regimen.  Last A1c reviewed-   Lab Results   Component Value Date    HGBA1C 8.0 (H) 09/25/2024     Most recent fingerstick glucose reviewed-   Recent Labs   Lab 10/17/24  1113   POCTGLUCOSE 147*     Current correctional scale      Maintain anti-hyperglycemic dose as follows-   Antihyperglycemics (From admission, onward)      Start     Stop Route Frequency Ordered    10/17/24 1133  insulin aspart U-100 pen 0-10 Units         -- SubQ Before meals & nightly PRN 10/17/24 1035          Hold Oral hypoglycemics while patient is in the hospital.        VTE Risk Mitigation (From admission, onward)           Ordered     enoxaparin injection 40 mg  Daily         10/17/24 1035     IP VTE HIGH RISK PATIENT  Once         10/17/24 1035     Place sequential compression device  Until discontinued         10/17/24 1035                    Thank you for your consult. I will follow-up with patient. Please contact us if you have any additional questions.    Ting Barry MD  Cardiology   [facility] - [place] - Telemetry

## 2024-10-18 NOTE — PLAN OF CARE
Problem: Adult Inpatient Plan of Care  Goal: Plan of Care Review  10/18/2024 0442 by Jose Luis Seo RN  Outcome: Progressing  10/18/2024 0441 by Jose Luis Seo RN  Outcome: Progressing  10/18/2024 0441 by Jose Luis Seo RN  Outcome: Not Progressing  Goal: Patient-Specific Goal (Individualized)  10/18/2024 0442 by Jose Luis Seo RN  Outcome: Progressing  10/18/2024 0441 by Jose Luis Seo, RN  Outcome: Progressing  10/18/2024 0441 by Jose Luis Seo RN  Outcome: Not Progressing  Goal: Absence of Hospital-Acquired Illness or Injury  10/18/2024 0442 by Jose Luis Seo RN  Outcome: Progressing  10/18/2024 0441 by Jose Luis Seo RN  Outcome: Progressing  10/18/2024 0441 by Jose Luis Seo RN  Outcome: Not Progressing  Goal: Optimal Comfort and Wellbeing  10/18/2024 0442 by Jose Luis Seo RN  Outcome: Progressing  10/18/2024 0441 by Jose Lusi Seo RN  Outcome: Progressing  10/18/2024 0441 by Jose Luis Seo, RN  Outcome: Not Progressing  Goal: Readiness for Transition of Care  10/18/2024 0442 by Jose Luis Seo RN  Outcome: Progressing  10/18/2024 0441 by Jose Luis Seo, RN  Outcome: Progressing  10/18/2024 0441 by Jose Luis Seo, RN  Outcome: Not Progressing     Problem: Diabetes Comorbidity  Goal: Blood Glucose Level Within Targeted Range  10/18/2024 0442 by Jose Luis Seo, RN  Outcome: Progressing  10/18/2024 0441 by Jose Luis Seo, RN  Outcome: Progressing  10/18/2024 0441 by Jose Luis Seo, RN  Outcome: Not Progressing     Problem: Chest Pain  Goal: Resolution of Chest Pain Symptoms  10/18/2024 0442 by Jose Luis Seo RN  Outcome: Progressing  10/18/2024 0441 by Jose Luis Seo RN  Outcome: Progressing  10/18/2024 0441 by Jose Luis Seo, RN  Outcome: Not Progressing

## 2024-10-18 NOTE — PLAN OF CARE
10/18/24 1343   Final Note   Assessment Type Final Discharge Note   Anticipated Discharge Disposition Home   Hospital Resources/Appts/Education Provided Appointments scheduled and added to AVS   Post-Acute Status   Post-Acute Authorization Other   Other Status No Post-Acute Service Needs     Pts nurse Marta notified that the pt can d/c from CM standpoint

## 2024-10-18 NOTE — ASSESSMENT & PLAN NOTE
Patient with chest pain.  Recent angiogram revealed nonobstructive CAD.  Troponins have been negative.  Will intensify medical regimen.

## 2024-10-18 NOTE — PROGRESS NOTES
[facility] [place] Telemetry  Cardiology  Progress Note    Patient Name: Emmanuel Grant  MRN: [id number]  Admission Date: 10/17/2024  Hospital Length of Stay: 0 days  Code Status: Full Code   Attending Physician: Hui White,*   Primary Care Physician: Wilfredo De Souza MD  Expected Discharge Date:   Principal Problem:Coronary artery disease involving native coronary artery of native heart with angina pectoris    Subjective:     Interval Hx: no further CP, no SOB.    Tele: SR 80s (personally reviewed and interpreted)        Review of Systems   Gastrointestinal:  Negative for melena.   Genitourinary:  Negative for hematuria.     Objective:     Vital Signs (Most Recent):  Temp: 98.6 °F (37 °C) (10/18/24 0750)  Pulse: 99 (10/18/24 0750)  Resp: 18 (10/18/24 0750)  BP: 102/75 (10/18/24 0750)  SpO2: 95 % (10/18/24 0750) Vital Signs (24h Range):  Temp:  [97.4 °F (36.3 °C)-98.6 °F (37 °C)] 98.6 °F (37 °C)  Pulse:  [] 99  Resp:  [16-22] 18  SpO2:  [93 %-100 %] 95 %  BP: (102-150)/(75-88) 102/75     Weight: 68 kg (149 lb 14.6 oz)  Body mass index is 22.79 kg/m².     SpO2: 95 %         Intake/Output Summary (Last 24 hours) at 10/18/2024 1023  Last data filed at 10/18/2024 0517  Gross per 24 hour   Intake 180 ml   Output --   Net 180 ml       Lines/Drains/Airways       Peripheral Intravenous Line  Duration                  Peripheral IV - Single Lumen 10/17/24 0813 20 G Left Antecubital 1 day         Peripheral IV - Single Lumen 10/17/24 0834 20 G Right Antecubital 1 day                       Physical Exam  Constitutional:       General: He is not in acute distress.     Appearance: Normal appearance. He is well-developed and normal weight. He is not ill-appearing, toxic-appearing or diaphoretic.   HENT:      Head: Normocephalic and atraumatic.   Eyes:      General: No scleral icterus.     Extraocular Movements: Extraocular movements intact.      Conjunctiva/sclera: Conjunctivae normal.      Pupils: Pupils are equal,  round, and reactive to light.   Neck:      Thyroid: No thyromegaly.      Vascular: No JVD.      Trachea: No tracheal deviation.   Cardiovascular:      Rate and Rhythm: Normal rate and regular rhythm.      Heart sounds: S1 normal and S2 normal. No murmur heard.     No friction rub. No gallop.   Pulmonary:      Effort: Pulmonary effort is normal. No respiratory distress.      Breath sounds: Normal breath sounds. No stridor. No wheezing, rhonchi or rales.   Chest:      Chest wall: No tenderness.   Abdominal:      General: There is no distension.      Palpations: Abdomen is soft.   Musculoskeletal:         General: No swelling or tenderness. Normal range of motion.      Cervical back: Normal range of motion and neck supple. No rigidity.      Right lower leg: No edema.      Left lower leg: No edema.   Skin:     General: Skin is warm and dry.      Coloration: Skin is not jaundiced.   Neurological:      General: No focal deficit present.      Mental Status: He is alert and oriented to person, place, and time.      Cranial Nerves: No cranial nerve deficit.   Psychiatric:         Mood and Affect: Mood normal.         Behavior: Behavior normal.           Current Medications:   amLODIPine  10 mg Oral Daily    aspirin  81 mg Oral Daily    atorvastatin  40 mg Oral QHS    enoxparin  40 mg Subcutaneous Daily    furosemide  20 mg Oral Daily    isosorbide mononitrate  30 mg Oral Daily    metoprolol succinate  50 mg Oral Daily    mycophenolate  500 mg Oral BID    polyethylene glycol  17 g Oral Daily    sacubitriL-valsartan  1 tablet Oral BID    tamsulosin  0.4 mg Oral Daily         Current Facility-Administered Medications:     acetaminophen, 650 mg, Oral, Q4H PRN    dextrose 10%, 12.5 g, Intravenous, PRN    dextrose 10%, 25 g, Intravenous, PRN    glucagon (human recombinant), 1 mg, Intramuscular, PRN    glucose, 16 g, Oral, PRN    glucose, 24 g, Oral, PRN    insulin aspart U-100, 0-10 Units, Subcutaneous, QID (AC + HS) PRN     melatonin, 6 mg, Oral, Nightly PRN    naloxone, 0.02 mg, Intravenous, PRN    nitroGLYCERIN, 0.4 mg, Sublingual, Q5 Min PRN    ondansetron, 4 mg, Intravenous, Q8H PRN    sodium chloride 0.9%, 10 mL, Intravenous, Q12H PRN    Laboratory (all labs reviewed):  CBC:  Recent Labs   Lab 09/28/24  0632 10/11/24  1941 10/12/24  0411 10/17/24  0845 10/18/24  0443   WBC 11.08 9.37 7.09 10.67 8.22   Hemoglobin 13.1 L 13.8 L 13.9 L 13.9 L 13.6 L   Hematocrit 38.8 L 40.6 42.1 40.9 39.7 L   Platelets 246 128 L 139 L 179 183       CHEMISTRIES:  Recent Labs   Lab 09/27/24  0509 09/28/24  0632 10/10/24  1014 10/11/24  1941 10/12/24  0411 10/17/24  0845 10/18/24  0443   Glucose 244 H 102 154 H 145 H 276 H 230 H 223 H   Sodium 138 139 138 138 137 136 136   Potassium 3.5 3.2 L 3.5 3.2 L 4.5 4.7 3.9   BUN 21 22 18 14 16 15 16   Creatinine 0.9 0.8 1.2 1.0 1.0 1.1 0.9   eGFR >60 >60 >60 >60 >60 >60 >60   Calcium 9.5 9.1 9.9 8.6 L 8.9 9.4 9.1   Magnesium 1.9 1.9  --  1.5 L 2.1  --  1.6       CARDIAC BIOMARKERS:  Recent Labs   Lab 07/20/23  1213 02/28/24  1625 09/25/24  1159 10/11/24  1941 10/17/24  0845 10/17/24  1222 10/17/24  1816     --   --   --   --   --   --    Troponin I  --    < > <0.006 0.006 <0.006 <0.006 0.015    < > = values in this interval not displayed.       COAGS:  Recent Labs   Lab 07/07/23  1212   INR 1.2       LIPIDS/LFTS:  Recent Labs   Lab 05/07/24  0752 06/16/24  1212 09/28/24  0632 10/11/24  1941 10/12/24  0411 10/17/24  0845 10/18/24  0443   Cholesterol 109 L  --   --   --   --   --  103 L   Triglycerides 60  --   --   --   --   --  78   HDL 39 L  --   --   --   --   --  32 L   LDL Cholesterol 58.0 L  --   --   --   --   --  55.4 L   Non-HDL Cholesterol 70  --   --   --   --   --  71   AST 18   < > 21 20 18 16 16   ALT 34   < > 23 27 26 27 23    < > = values in this interval not displayed.       BNP:  Recent Labs   Lab 09/10/24  0826 09/24/24  1809 09/25/24  1159 10/11/24  1941 10/17/24  0845   BNP 84 56 143 H  67 96       TSH:        Free T4:        Diagnostic Results:  Echo: 10/17/24 (Ao root 4.0cm on report 7/2/24)    Left Ventricle: Regional wall motion abnormalities present. There is normal systolic function with a visually estimated ejection fraction of 55 %. Grade I diastolic dysfunction. federico septal hypokinesis    Right Ventricle: Normal right ventricular cavity size. Systolic function is normal.    Left Atrium: Left atrium is mildly dilated.    IVC/SVC: Normal venous pressure at 3 mmHg.    Cath 9/10/24    There was non-obstructive coronary artery disease. Patent LAD stent, Small D1 -  (jailed), Cx/RCA with luminal irregularities    The post-procedure left ventricular end diastolic pressure was 17.     L MPI 6/17/24     Abnormal myocardial perfusion scan.    There is a severe intensity, medium sized, mostly fixed perfusion abnormality with minimal reversibilty in the mid to apical anterior and apical wall(s) in the typical distribution of the LAD territory.  This is conssistent with scar and minimal periinfarct ischemia.    There are no other significant perfusion abnormalities.    The gated perfusion images showed an ejection fraction of 40% post stress.    There is anteroapical wall akinesis during stress.    Assessment and Plan:     * Coronary artery disease involving native coronary artery of native heart with angina pectoris  Now CP free, trops neg.  Recent angiogram revealed nonobstructive CAD.    Cont med rx  Consider GI eval as outpatient.  Fundoplication planned (pt prev cleared for this at his last OV).      Type 2 diabetes mellitus without complication, without long-term current use of insulin  Mgmt per IM    Interstitial lung disease exacerbation  Mgmt per IM    Essential hypertension  Cont med rx    Hyperlipidemia  Cont statin        VTE Risk Mitigation (From admission, onward)           Ordered     enoxaparin injection 40 mg  Daily         10/17/24 1035     IP VTE HIGH RISK PATIENT  Once          10/17/24 1035     Place sequential compression device  Until discontinued         10/17/24 1035                  Dispo planning appropriate  Cardiology will sign off, pls call with questions.  Pt to follow up with me after discharge.  Schedule OV in 6-8 weeks.    Brandon Ventura MD  Cardiology  [facility] [place] - Telemetry

## 2024-10-18 NOTE — SUBJECTIVE & OBJECTIVE
Review of Systems   Gastrointestinal:  Negative for melena.   Genitourinary:  Negative for hematuria.     Objective:     Vital Signs (Most Recent):  Temp: 98.6 °F (37 °C) (10/18/24 0750)  Pulse: 99 (10/18/24 0750)  Resp: 18 (10/18/24 0750)  BP: 102/75 (10/18/24 0750)  SpO2: 95 % (10/18/24 0750) Vital Signs (24h Range):  Temp:  [97.4 °F (36.3 °C)-98.6 °F (37 °C)] 98.6 °F (37 °C)  Pulse:  [] 99  Resp:  [16-22] 18  SpO2:  [93 %-100 %] 95 %  BP: (102-150)/(75-88) 102/75     Weight: 68 kg (149 lb 14.6 oz)  Body mass index is 22.79 kg/m².     SpO2: 95 %         Intake/Output Summary (Last 24 hours) at 10/18/2024 1023  Last data filed at 10/18/2024 0517  Gross per 24 hour   Intake 180 ml   Output --   Net 180 ml       Lines/Drains/Airways       Peripheral Intravenous Line  Duration                  Peripheral IV - Single Lumen 10/17/24 0813 20 G Left Antecubital 1 day         Peripheral IV - Single Lumen 10/17/24 0834 20 G Right Antecubital 1 day                       Physical Exam  Constitutional:       General: He is not in acute distress.     Appearance: Normal appearance. He is well-developed and normal weight. He is not ill-appearing, toxic-appearing or diaphoretic.   HENT:      Head: Normocephalic and atraumatic.   Eyes:      General: No scleral icterus.     Extraocular Movements: Extraocular movements intact.      Conjunctiva/sclera: Conjunctivae normal.      Pupils: Pupils are equal, round, and reactive to light.   Neck:      Thyroid: No thyromegaly.      Vascular: No JVD.      Trachea: No tracheal deviation.   Cardiovascular:      Rate and Rhythm: Normal rate and regular rhythm.      Heart sounds: S1 normal and S2 normal. No murmur heard.     No friction rub. No gallop.   Pulmonary:      Effort: Pulmonary effort is normal. No respiratory distress.      Breath sounds: Normal breath sounds. No stridor. No wheezing, rhonchi or rales.   Chest:      Chest wall: No tenderness.   Abdominal:      General: There is  no distension.      Palpations: Abdomen is soft.   Musculoskeletal:         General: No swelling or tenderness. Normal range of motion.      Cervical back: Normal range of motion and neck supple. No rigidity.      Right lower leg: No edema.      Left lower leg: No edema.   Skin:     General: Skin is warm and dry.      Coloration: Skin is not jaundiced.   Neurological:      General: No focal deficit present.      Mental Status: He is alert and oriented to person, place, and time.      Cranial Nerves: No cranial nerve deficit.   Psychiatric:         Mood and Affect: Mood normal.         Behavior: Behavior normal.           Current Medications:   amLODIPine  10 mg Oral Daily    aspirin  81 mg Oral Daily    atorvastatin  40 mg Oral QHS    enoxparin  40 mg Subcutaneous Daily    furosemide  20 mg Oral Daily    isosorbide mononitrate  30 mg Oral Daily    metoprolol succinate  50 mg Oral Daily    mycophenolate  500 mg Oral BID    polyethylene glycol  17 g Oral Daily    sacubitriL-valsartan  1 tablet Oral BID    tamsulosin  0.4 mg Oral Daily         Current Facility-Administered Medications:     acetaminophen, 650 mg, Oral, Q4H PRN    dextrose 10%, 12.5 g, Intravenous, PRN    dextrose 10%, 25 g, Intravenous, PRN    glucagon (human recombinant), 1 mg, Intramuscular, PRN    glucose, 16 g, Oral, PRN    glucose, 24 g, Oral, PRN    insulin aspart U-100, 0-10 Units, Subcutaneous, QID (AC + HS) PRN    melatonin, 6 mg, Oral, Nightly PRN    naloxone, 0.02 mg, Intravenous, PRN    nitroGLYCERIN, 0.4 mg, Sublingual, Q5 Min PRN    ondansetron, 4 mg, Intravenous, Q8H PRN    sodium chloride 0.9%, 10 mL, Intravenous, Q12H PRN    Laboratory (all labs reviewed):  CBC:  Recent Labs   Lab 09/28/24  0632 10/11/24  1941 10/12/24  0411 10/17/24  0845 10/18/24  0443   WBC 11.08 9.37 7.09 10.67 8.22   Hemoglobin 13.1 L 13.8 L 13.9 L 13.9 L 13.6 L   Hematocrit 38.8 L 40.6 42.1 40.9 39.7 L   Platelets 246 128 L 139 L 179 183       CHEMISTRIES:  Recent  Labs   Lab 09/27/24  0509 09/28/24  0632 10/10/24  1014 10/11/24  1941 10/12/24  0411 10/17/24  0845 10/18/24  0443   Glucose 244 H 102 154 H 145 H 276 H 230 H 223 H   Sodium 138 139 138 138 137 136 136   Potassium 3.5 3.2 L 3.5 3.2 L 4.5 4.7 3.9   BUN 21 22 18 14 16 15 16   Creatinine 0.9 0.8 1.2 1.0 1.0 1.1 0.9   eGFR >60 >60 >60 >60 >60 >60 >60   Calcium 9.5 9.1 9.9 8.6 L 8.9 9.4 9.1   Magnesium 1.9 1.9  --  1.5 L 2.1  --  1.6       CARDIAC BIOMARKERS:  Recent Labs   Lab 07/20/23  1213 02/28/24  1625 09/25/24  1159 10/11/24  1941 10/17/24  0845 10/17/24  1222 10/17/24  1816     --   --   --   --   --   --    Troponin I  --    < > <0.006 0.006 <0.006 <0.006 0.015    < > = values in this interval not displayed.       COAGS:  Recent Labs   Lab 07/07/23  1212   INR 1.2       LIPIDS/LFTS:  Recent Labs   Lab 05/07/24  0752 06/16/24  1212 09/28/24  0632 10/11/24  1941 10/12/24  0411 10/17/24  0845 10/18/24  0443   Cholesterol 109 L  --   --   --   --   --  103 L   Triglycerides 60  --   --   --   --   --  78   HDL 39 L  --   --   --   --   --  32 L   LDL Cholesterol 58.0 L  --   --   --   --   --  55.4 L   Non-HDL Cholesterol 70  --   --   --   --   --  71   AST 18   < > 21 20 18 16 16   ALT 34   < > 23 27 26 27 23    < > = values in this interval not displayed.       BNP:  Recent Labs   Lab 09/10/24  0826 09/24/24  1809 09/25/24  1159 10/11/24  1941 10/17/24  0845   BNP 84 56 143 H 67 96       TSH:        Free T4:        Diagnostic Results:  Echo: 10/17/24 (Ao root 4.0cm on report 7/2/24)    Left Ventricle: Regional wall motion abnormalities present. There is normal systolic function with a visually estimated ejection fraction of 55 %. Grade I diastolic dysfunction. federico septal hypokinesis    Right Ventricle: Normal right ventricular cavity size. Systolic function is normal.    Left Atrium: Left atrium is mildly dilated.    IVC/SVC: Normal venous pressure at 3 mmHg.    Cath 9/10/24    There was non-obstructive  coronary artery disease. Patent LAD stent, Small D1 -  (jailed), Cx/RCA with luminal irregularities    The post-procedure left ventricular end diastolic pressure was 17.     L MPI 6/17/24     Abnormal myocardial perfusion scan.    There is a severe intensity, medium sized, mostly fixed perfusion abnormality with minimal reversibilty in the mid to apical anterior and apical wall(s) in the typical distribution of the LAD territory.  This is conssistent with scar and minimal periinfarct ischemia.    There are no other significant perfusion abnormalities.    The gated perfusion images showed an ejection fraction of 40% post stress.    There is anteroapical wall akinesis during stress.

## 2024-10-18 NOTE — NURSING
PER handoff received from KANDI Amaya     Pt resting in bed quietly. NAD noted. No c/o pain.     Fall and safety precautions maintained. Bed alarm activated and audible.. Bed locked in lowest position, with side rails up x2. Call bell and personal items within reach

## 2024-10-18 NOTE — ASSESSMENT & PLAN NOTE
Patient's FSGs are uncontrolled due to hyperglycemia on current medication regimen.  Last A1c reviewed-   Lab Results   Component Value Date    HGBA1C 8.0 (H) 09/25/2024     Most recent fingerstick glucose reviewed-   Recent Labs   Lab 10/17/24  1113   POCTGLUCOSE 147*     Current correctional scale      Maintain anti-hyperglycemic dose as follows-   Antihyperglycemics (From admission, onward)      Start     Stop Route Frequency Ordered    10/17/24 1133  insulin aspart U-100 pen 0-10 Units         -- SubQ Before meals & nightly PRN 10/17/24 1035          Hold Oral hypoglycemics while patient is in the hospital.

## 2024-10-18 NOTE — HOSPITAL COURSE
Emmanuel Grant admitted to the hospital for complaints of chest heaviness and shortness on breath. Patient afebrile without leukocytosis, hemodynamically stable on presentation, ALP 54, normal BNP 96, initial troponin normal, chest x-ray with no acute findings. EKG shows NSR with T-wave inversions in high lateral leads.  Cardiology consulted.  Patient chest pain-free troponins and negative.  Recent angiogram revealed nonobstructive CAD.  Cardiology intensified medication regimen by including Imdur 30 mg daily. Patient was explained that his shortness on breath and interstitial lung disease is likely worsening due to continue reflux/hiatal hernia.  Patient was supposed to undergo fundoplication with General surgery and being followed by GI. Patient states that he was scared from the surgery.  Per chart review, patient followed by Pulmonary for ILD and stated that patient's hiatal hernia repair would likely benefit worsening ILD due to reflux.  Recommended that patient follow up with GI/general surgery for further evaluation and follow up for surgical measures.  Patient patient was in agreement stated that he will follow up as soon as he leaves the hospital.  Patient was prescribed Imdur 30 mg daily, which we will be delivered at bedside from pharmacy prior to discharge.  Patient denies any chest pain, shortness on breath, or any other associated symptoms at the moment.  Patient medically stable for discharge.  Return precautions given.    All findings and plan were explained to the patient. All questions and concerns were answered. Patient verbalized understanding. Patient is in stable condition to d/c home and has been informed to follow up with his PCP within the next 7-10 days to discuss his observation stay and to follow-up with his Cardiology and GI.  Patient has been educated to return to the ED if He experiences any further chest pain, lightheadness, weakness, or discomfort.

## 2024-10-18 NOTE — DISCHARGE SUMMARY
[facility] Medicine  Discharge Summary      Patient Name: Emmanuel Grant  MRN: [id number]  ALVARO: [id number]  Patient Class: OP- Observation  Admission Date: 10/17/2024  Hospital Length of Stay: 0 days  Discharge Date and Time:  10/18/2024 1:48 PM  Attending Physician: Hui White,*   Discharging Provider: Jasvir Hunt PA-C  Primary Care Provider: Wilfredo De Souza MD    Primary Care Team: Networked reference to record PCT     HPI:   Emmanuel Grant is a 73 y.o. with a pmh of ILD (on home O2), CAD/MI/PCI, HFrEF with EF: 40%, hypertension, DM 2, hyperlipidemia, CVA, and BPH presents to the hospital with complaints of midsternal chest pain described as heaviness/burning after a morning walk today. Patient also complains of associated exertional dyspnea. Per ED note, patient was administered SL nitroglycerin and 324 mg aspirin en route with improvement in pain.  Patient with a history of interstitial lung disease on chronic oxygen at home (2 L). Patient denies any other alleviating or exacerbating factors. Patient denies headache, blurry vision, diaphoresis, nausea, vomiting, diarrhea, melena, hematemesis, or any other associated symptoms.    In the ED: Patient afebrile without leukocytosis, hemodynamically stable on presentation, ALP 54, normal BNP 96, initial troponin normal, chest x-ray with no acute findings.  EKG shows NSR with T-wave inversions in high lateral leads. Case discussed with ED provider and patient will be placed under observation for further medical management.    * No surgery found *      Hospital Course:   Emmanuel Grant admitted to the hospital for complaints of chest heaviness and shortness on breath. Patient afebrile without leukocytosis, hemodynamically stable on presentation, ALP 54, normal BNP 96, initial troponin normal, chest x-ray with no acute findings. EKG shows NSR with T-wave inversions in high lateral leads.  Cardiology consulted.  Patient chest  pain-free troponins and negative.  Recent angiogram revealed nonobstructive CAD.  Cardiology intensified medication regimen by including Imdur 30 mg daily. Patient was explained that his shortness on breath and interstitial lung disease is likely worsening due to continue reflux/hiatal hernia.  Patient was supposed to undergo fundoplication with General surgery and being followed by GI. Patient states that he was scared from the surgery.  Per chart review, patient followed by Pulmonary for ILD and stated that patient's hiatal hernia repair would likely benefit worsening ILD due to reflux.  Recommended that patient follow up with GI/general surgery for further evaluation and follow up for surgical measures.  Patient patient was in agreement stated that he will follow up as soon as he leaves the hospital.  Patient was prescribed Imdur 30 mg daily, which we will be delivered at bedside from pharmacy prior to discharge.  Patient denies any chest pain, shortness on breath, or any other associated symptoms at the moment.  Patient medically stable for discharge.  Return precautions given.    All findings and plan were explained to the patient. All questions and concerns were answered. Patient verbalized understanding. Patient is in stable condition to d/c home and has been informed to follow up with his PCP within the next 7-10 days to discuss his observation stay and to follow-up with his Cardiology and GI.  Patient has been educated to return to the ED if He experiences any further chest pain, lightheadness, weakness, or discomfort.       Goals of Care Treatment Preferences:  Code Status: Full Code      SDOH Screening:  The patient declined to be screened for utility difficulties, food insecurity, transport difficulties, housing insecurity, and interpersonal safety, so no concerns could be identified this admission.     Consults:   Consults (From admission, onward)          Status Ordering Provider     Inpatient consult  to Social Work  Once        Provider:  (Not yet assigned)    Completed SIDDHARTHA BEAN     Inpatient consult to Cardiology  Once        Provider:  Ting Barry MD    Completed PAPO YUN JR            No new Assessment & Plan notes have been filed under this hospital service since the last note was generated.  Service: Hospital Medicine    Final Active Diagnoses:    Diagnosis Date Noted POA    PRINCIPAL PROBLEM:  Coronary artery disease involving native coronary artery of native heart with angina pectoris [I25.119] 02/28/2024 Yes    Chest pain [R07.9] 10/17/2024 Yes    Chronic hypoxemic respiratory failure [J96.11] 09/23/2024 Yes    Chronic HFrEF (heart failure with reduced ejection fraction) [I50.22] 05/01/2024 Yes    Interstitial lung disease exacerbation [J84.9] 02/28/2024 Yes    BPH (benign prostatic hyperplasia) [N40.0] 02/28/2024 Yes    Type 2 diabetes mellitus without complication, without long-term current use of insulin [E11.9] 10/08/2021 Yes    Hyperlipidemia [E78.5] 05/29/2020 Yes    Essential hypertension [I10] 05/29/2020 Yes      Problems Resolved During this Admission:       Discharged Condition: stable    Disposition: Home or Self Care    Follow Up:    Patient Instructions:      Ambulatory referral/consult to Gastroenterology   Standing Status: Future   Referral Priority: Routine Referral Type: Consultation   Referral Reason: Specialty Services Required   Requested Specialty: Gastroenterology   Number of Visits Requested: 1     Diet Cardiac     Diet diabetic     Notify your health care provider if you experience any of the following:  persistent nausea and vomiting or diarrhea     Notify your health care provider if you experience any of the following:  difficulty breathing or increased cough     Activity as tolerated       Significant Diagnostic Studies: Labs: CMP   Recent Labs   Lab 10/17/24  0845 10/18/24  0443    136   K 4.7 3.9    103   CO2 26 24   * 223*   BUN  15 16   CREATININE 1.1 0.9   CALCIUM 9.4 9.1   PROT 6.7 6.3   ALBUMIN 3.4* 3.2*   BILITOT 0.7 0.6   ALKPHOS 54* 52   AST 16 16   ALT 27 23   ANIONGAP 9 9   , CBC   Recent Labs   Lab 10/17/24  0845 10/18/24  0443   WBC 10.67 8.22   HGB 13.9* 13.6*   HCT 40.9 39.7*    183   , and Troponin   Recent Labs   Lab 10/17/24  0845 10/17/24  1222 10/17/24  1816   TROPONINI <0.006 <0.006 0.015       Results for orders placed during the hospital encounter of 10/17/24    Echo    Interpretation Summary    Left Ventricle: Regional wall motion abnormalities present. There is normal systolic function with a visually estimated ejection fraction of 55 %. Grade I diastolic dysfunction. federico septal hypokinesis    Right Ventricle: Normal right ventricular cavity size. Systolic function is normal.    Left Atrium: Left atrium is mildly dilated.    IVC/SVC: Normal venous pressure at 3 mmHg.      Pending Diagnostic Studies:       None           Medications:  Reconciled Home Medications:      Medication List        START taking these medications      isosorbide mononitrate 30 MG 24 hr tablet  Commonly known as: IMDUR  Take 1 tablet (30 mg total) by mouth once daily.            CONTINUE taking these medications      albuterol-ipratropium 2.5 mg-0.5 mg/3 mL nebulizer solution  Commonly known as: DUO-NEB  Take 3 mLs by nebulization every 6 (six) hours as needed for Wheezing. Rescue     amLODIPine 10 MG tablet  Commonly known as: NORVASC  Take 1 tablet (10 mg total) by mouth once daily.     aspirin 81 MG Chew  Take 81 mg by mouth once daily.     atorvastatin 40 MG tablet  Commonly known as: LIPITOR  Take 1 tablet (40 mg total) by mouth every evening.     azithromycin 250 MG tablet  Commonly known as: Z-ARA  Take 2 tablets by mouth on day 1; Take 1 tablet by mouth on days 2-5     BEANO ORAL  Take 1 tablet by mouth 2 (two) times a day.     cholecalciferol (vitamin D3) 50 mcg (2,000 unit) Cap capsule  Commonly known as: VITAMIN D3  Take 1  capsule by mouth once daily.     ENTRESTO 24-26 mg per tablet  Generic drug: sacubitriL-valsartan  Take 1 tablet by mouth 2 (two) times daily.     furosemide 20 MG tablet  Commonly known as: LASIX  Take 1 tablet (20 mg total) by mouth once daily.     glimepiride 2 MG tablet  Commonly known as: AMARYL  Take 1 tablet (2 mg total) by mouth 2 (two) times a day. Take with meals     metFORMIN 1000 MG tablet  Commonly known as: GLUCOPHAGE  Take 1 tablet (1,000 mg total) by mouth 2 (two) times daily with meals.     methylPREDNISolone 4 mg tablet  Commonly known as: MEDROL DOSEPACK  use as directed     metoprolol succinate 50 MG 24 hr tablet  Commonly known as: TOPROL-XL  Take 1 tablet (50 mg total) by mouth once daily.     mirabegron 25 mg Tb24 ER tablet  Commonly known as: MYRBETRIQ  Take 1 tablet (25 mg total) by mouth once daily.     mycophenolate 250 mg Cap  Commonly known as: CELLCEPT  Take 2 capsules (500 mg total) by mouth 2 (two) times daily.     omeprazole 40 MG capsule  Commonly known as: PRILOSEC  Take 40 mg by mouth every morning.     sucralfate 1 gram tablet  Commonly known as: CARAFATE  Take 1 tablet (1 g total) by mouth 4 (four) times daily before meals and nightly.     tamsulosin 0.4 mg Cap  Commonly known as: FLOMAX  Take 1 capsule (0.4 mg total) by mouth once daily.              Indwelling Lines/Drains at time of discharge:   Lines/Drains/Airways       None                   Time spent on the discharge of patient: 45 minutes         Jasvir Hunt PA-C  Department of Hospital Medicine  [facility]

## 2024-10-18 NOTE — SUBJECTIVE & OBJECTIVE
"Past Medical History:   Diagnosis Date    CAD (coronary artery disease)     Sees [facility], h/o stent    Diabetes mellitus     Hypertension     Kidney stone     Stroke     age 62       Past Surgical History:   Procedure Laterality Date    CARDIAC CATHETERIZATION      with stent    COLONOSCOPY N/A 03/27/2024    Procedure: COLONOSCOPY;  Surgeon: Ariela Castro MD;  Location: [facility] ENDO;  Service: Endoscopy;  Laterality: N/A;    ESOPHAGOGASTRODUODENOSCOPY N/A 03/27/2024    Procedure: EGD (ESOPHAGOGASTRODUODENOSCOPY);  Surgeon: Ariela Castro MD;  Location: [facility] ENDO;  Service: Endoscopy;  Laterality: N/A;    LEFT HEART CATHETERIZATION Left 9/10/2024    Procedure: Left heart cath;  Surgeon: Jemal Drummond MD;  Location: [facility] CATH LAB;  Service: Cardiology;  Laterality: Left;    SHOULDER SURGERY Right        Review of patient's allergies indicates:   Allergen Reactions    Dapagliflozin      Other reaction(s): Other (See Comments)    Pcn [penicillins]     Linagliptin Other (See Comments)     "it knocked me down", "it almost killed me"    Lisinopril Other (See Comments)     cough    Pantoprazole Hives       No current facility-administered medications on file prior to encounter.     Current Outpatient Medications on File Prior to Encounter   Medication Sig    alpha-D-galactosidase (BEANO ORAL) Take 1 tablet by mouth 2 (two) times a day.    amLODIPine (NORVASC) 10 MG tablet Take 1 tablet (10 mg total) by mouth once daily.    aspirin 81 MG Chew Take 81 mg by mouth once daily.    atorvastatin (LIPITOR) 40 MG tablet Take 1 tablet (40 mg total) by mouth every evening.    cholecalciferol, vitamin D3, (VITAMIN D3) 50 mcg (2,000 unit) Cap capsule Take 1 capsule by mouth once daily.    furosemide (LASIX) 20 MG tablet Take 1 tablet (20 mg total) by mouth once daily.    glimepiride (AMARYL) 2 MG tablet Take 1 tablet (2 mg total) by mouth 2 (two) times a day. Take with meals    metFORMIN (GLUCOPHAGE) 1000 MG tablet Take " 1 tablet (1,000 mg total) by mouth 2 (two) times daily with meals.    metoprolol succinate (TOPROL-XL) 50 MG 24 hr tablet Take 1 tablet (50 mg total) by mouth once daily.    mirabegron (MYRBETRIQ) 25 mg Tb24 ER tablet Take 1 tablet (25 mg total) by mouth once daily.    mycophenolate (CELLCEPT) 250 mg Cap Take 2 capsules (500 mg total) by mouth 2 (two) times daily.    sacubitriL-valsartan (ENTRESTO) 24-26 mg per tablet Take 1 tablet by mouth 2 (two) times daily.    tamsulosin (FLOMAX) 0.4 mg Cap Take 1 capsule (0.4 mg total) by mouth once daily.    albuterol-ipratropium (DUO-NEB) 2.5 mg-0.5 mg/3 mL nebulizer solution Take 3 mLs by nebulization every 6 (six) hours as needed for Wheezing. Rescue    azithromycin (Z-ARA) 250 MG tablet Take 2 tablets by mouth on day 1; Take 1 tablet by mouth on days 2-5    methylPREDNISolone (MEDROL DOSEPACK) 4 mg tablet use as directed    omeprazole (PRILOSEC) 40 MG capsule Take 40 mg by mouth every morning.    sucralfate (CARAFATE) 1 gram tablet Take 1 tablet (1 g total) by mouth 4 (four) times daily before meals and nightly.     Family History       Problem Relation (Age of Onset)    Cancer Mother    Colon cancer Sister          Tobacco Use    Smoking status: Never     Passive exposure: Never    Smokeless tobacco: Never   Substance and Sexual Activity    Alcohol use: No    Drug use: Never    Sexual activity: Not Currently     Review of Systems   Constitutional: Negative.   HENT: Negative.     Eyes: Negative.    Cardiovascular:  Positive for chest pain.   Respiratory: Negative.     Endocrine: Negative.    Hematologic/Lymphatic: Negative.    Skin: Negative.    Musculoskeletal: Negative.    Gastrointestinal: Negative.    Genitourinary: Negative.    Neurological: Negative.    Psychiatric/Behavioral: Negative.     Allergic/Immunologic: Negative.      Objective:     Vital Signs (Most Recent):  Temp: 97.8 °F (36.6 °C) (10/17/24 1645)  Pulse: 90 (10/17/24 1849)  Resp: 19 (10/17/24 1645)  BP:  (!) 150/80 (10/17/24 1645)  SpO2: (!) 93 % (10/17/24 1645) Vital Signs (24h Range):  Temp:  [97.8 °F (36.6 °C)-98.3 °F (36.8 °C)] 97.8 °F (36.6 °C)  Pulse:  [75-90] 90  Resp:  [16-25] 19  SpO2:  [93 %-100 %] 93 %  BP: ()/(62-88) 150/80     Weight: 68 kg (149 lb 14.6 oz)  Body mass index is 22.79 kg/m².    SpO2: (!) 93 %       No intake or output data in the 24 hours ending 10/1952    Lines/Drains/Airways       Peripheral Intravenous Line  Duration                  Peripheral IV - Single Lumen 10/17/24 0813 20 G Left Antecubital <1 day         Peripheral IV - Single Lumen 10/17/24 0834 20 G Right Antecubital <1 day                     Physical Exam  Vitals reviewed.   Constitutional:       Appearance: He is well-developed.   HENT:      Head: Normocephalic.   Eyes:      Conjunctiva/sclera: Conjunctivae normal.      Pupils: Pupils are equal, round, and reactive to light.   Cardiovascular:      Rate and Rhythm: Normal rate and regular rhythm.      Heart sounds: Normal heart sounds.   Pulmonary:      Effort: Pulmonary effort is normal.      Breath sounds: Normal breath sounds.   Abdominal:      General: Bowel sounds are normal.      Palpations: Abdomen is soft.   Musculoskeletal:      Cervical back: Normal range of motion and neck supple.   Skin:     General: Skin is warm.   Neurological:      Mental Status: He is alert and oriented to person, place, and time.          Significant Labs:     DATA:     Laboratory:  CBC:  Recent Labs   Lab 10/11/24  1941 10/12/24  0411 10/17/24  0845   WBC 9.37 7.09 10.67   Hemoglobin 13.8 L 13.9 L 13.9 L   Hematocrit 40.6 42.1 40.9   Platelets 128 L 139 L 179       CHEMISTRIES:  Recent Labs   Lab 09/26/22  1345 11/21/22  0926 02/28/24  1625 09/28/24  0632 10/10/24  1014 10/11/24  1941 10/12/24  0411 10/17/24  0845   Glucose  --   --    < > 102   < > 145 H 276 H 230 H   Sodium 139 139   < > 139   < > 138 137 136   Potassium 3.9 4.5   < > 3.2 L   < > 3.2 L 4.5 4.7   BUN 18.1 H  14.4   < > 22   < > 14 16 15   Creatinine 1.19 1.12   < > 0.8   < > 1.0 1.0 1.1   eGFR  72 L 71 L  --   --   --   --   --   --    Calcium 9.2 9.2   < > 9.1   < > 8.6 L 8.9 9.4   Magnesium  --   --    < > 1.9  --  1.5 L 2.1  --     < > = values in this interval not displayed.       CARDIAC BIOMARKERS:  Recent Labs   Lab 07/20/23  1213 02/28/24  1625 10/17/24  0845 10/17/24  1222 10/17/24  1816     --   --   --   --    Troponin I  --    < > <0.006 <0.006 0.015    < > = values in this interval not displayed.       COAGS:  Recent Labs   Lab 07/07/23  1212   INR 1.2       LIPIDS/LFTS:  Recent Labs   Lab 05/07/24  0752 06/16/24  1212 10/11/24  1941 10/12/24  0411 10/17/24  0845   Cholesterol 109 L  --   --   --   --    Triglycerides 60  --   --   --   --    HDL 39 L  --   --   --   --    LDL Cholesterol 58.0 L  --   --   --   --    Non-HDL Cholesterol 70  --   --   --   --    AST 18   < > 20 18 16   ALT 34   < > 27 26 27    < > = values in this interval not displayed.       Hemoglobin A1C   Date Value Ref Range Status   09/25/2024 8.0 (H) 4.0 - 5.6 % Final     Comment:     ADA Screening Guidelines:  5.7-6.4%  Consistent with prediabetes  >or=6.5%  Consistent with diabetes    High levels of fetal hemoglobin interfere with the HbA1C  assay. Heterozygous hemoglobin variants (HbS, HgC, etc)do  not significantly interfere with this assay.   However, presence of multiple variants may affect accuracy.     08/05/2024 8.3 (H) 4.0 - 5.6 % Final     Comment:     ADA Screening Guidelines:  5.7-6.4%  Consistent with prediabetes  >or=6.5%  Consistent with diabetes    High levels of fetal hemoglobin interfere with the HbA1C  assay. Heterozygous hemoglobin variants (HbS, HgC, etc)do  not significantly interfere with this assay.   However, presence of multiple variants may affect accuracy.     06/16/2024 7.4 (H) 4.0 - 5.6 % Final     Comment:     ADA Screening Guidelines:  5.7-6.4%  Consistent with  prediabetes  >or=6.5%  Consistent with diabetes    High levels of fetal hemoglobin interfere with the HbA1C  assay. Heterozygous hemoglobin variants (HbS, HgC, etc)do  not significantly interfere with this assay.   However, presence of multiple variants may affect accuracy.         TSH        The ASCVD Risk score (Wayne FLORES, et al., 2019) failed to calculate for the following reasons:    Risk score cannot be calculated because patient has a medical history suggesting prior/existing ASCVD       BNP    Lab Results   Component Value Date/Time    BNP 96 10/17/2024 08:45 AM    BNP 67 10/11/2024 07:41 PM     (H) 09/25/2024 11:59 AM    BNP 56 09/24/2024 06:09 PM    BNP 84 09/10/2024 08:26 AM    BNP 51 07/15/2024 05:48 PM    BNP 72 07/06/2024 10:48 AM    BNP 45 06/29/2024 10:09 AM     (H) 06/16/2024 12:12 PM    BNP 65 05/01/2024 09:40 PM    BNP 51 02/28/2024 04:25 PM            ECHO    Results for orders placed during the hospital encounter of 10/17/24    Echo    Interpretation Summary    Left Ventricle: Regional wall motion abnormalities present. There is normal systolic function with a visually estimated ejection fraction of 55 %. Grade I diastolic dysfunction. federico septal hypokinesis    Right Ventricle: Normal right ventricular cavity size. Systolic function is normal.    Left Atrium: Left atrium is mildly dilated.    IVC/SVC: Normal venous pressure at 3 mmHg.      STRESS TEST    Results for orders placed during the hospital encounter of 06/16/24    Nuclear Stress - Cardiology Interpreted    Interpretation Summary    Abnormal myocardial perfusion scan.    There is a severe intensity, medium sized, mostly fixed perfusion abnormality with minimal reversibilty in the mid to apical anterior and apical wall(s) in the typical distribution of the LAD territory.  This is conssistent with scar and minimal periinfarct ischemia.    There are no other significant perfusion abnormalities.    The gated perfusion images  showed an ejection fraction of 40% post stress.    There is anteroapical wall akinesis during stress.        CATH    Results for orders placed during the hospital encounter of 09/10/24    Cardiac catheterization    Conclusion    There was non-obstructive coronary artery disease. Patnet LAD stent, Small D1 - , Cx/RCA with luminal irregularities    The post-procedure left ventricular end diastolic pressure was 17.    The procedure log was documented by Documenter: Samira Perez RN and verified by Jemal Drummond MD.    Date: 9/10/2024  Time: 1:37 PM

## 2024-10-18 NOTE — NURSING
[facility] [place]  Nurses Note -- 4 Eyes      10/18/2024       Skin assessed on: Q Shift      [x] No Pressure Injuries Present    [x]Prevention Measures Documented    [] Yes LDA  for Pressure Injury Previously documented     [] Yes New Pressure Injury Discovered   [] LDA for New Pressure Injury Added      Attending RN:  Marta Bryant RN     Second RN:  KANDI Amaya

## 2024-10-18 NOTE — HPI
Patient is a pleasant 73-year-old man with past medical history significant for interstitial lung disease, coronary artery disease, recent stress test which showed fixed scar with gricelda-infarct ischemia, follows with Dr. Hawkins in his clinic.  Came in with some substernal chest pressure and pain.  Now resolved.  Currently chest pain-free.  Denies orthopnea PND swelling of feet.  Resting comfortably in bed.    Results for orders placed during the hospital encounter of 10/17/24    Echo    Interpretation Summary    Left Ventricle: Regional wall motion abnormalities present. There is normal systolic function with a visually estimated ejection fraction of 55 %. Grade I diastolic dysfunction. federico septal hypokinesis    Right Ventricle: Normal right ventricular cavity size. Systolic function is normal.    Left Atrium: Left atrium is mildly dilated.    IVC/SVC: Normal venous pressure at 3 mmHg.          HPI: Emmanuel Grant is a 73 y.o. with a pmh of ILD (on home O2), CAD/MI/PCI, HFrEF with EF: 40%, hypertension, DM 2, hyperlipidemia, CVA, and BPH presents to the hospital with complaints of midsternal chest pain described as heaviness/burning after a morning walk today. Patient also complains of associated exertional dyspnea. Per ED note, patient was administered SL nitroglycerin and 324 mg aspirin en route with improvement in pain.  Patient with a history of interstitial lung disease on chronic oxygen at home (2 L). Patient denies any other alleviating or exacerbating factors. Patient denies headache, blurry vision, diaphoresis, nausea, vomiting, diarrhea, melena, hematemesis, or any other associated symptoms.     In the ED: Patient afebrile without leukocytosis, hemodynamically stable on presentation, ALP 54, normal BNP 96, initial troponin normal, chest x-ray with no acute findings.  EKG shows NSR with T-wave inversions in high lateral leads. Case discussed with ED provider and patient will be placed under observation  for further medical management.      Results for orders placed or performed during the hospital encounter of 10/11/24   EKG 12-lead    Collection Time: 10/11/24  6:47 PM   Result Value Ref Range    QRS Duration 96 ms    OHS QTC Calculation 438 ms    Narrative    Test Reason : R06.02,    Vent. Rate : 088 BPM     Atrial Rate : 088 BPM     P-R Int : 186 ms          QRS Dur : 096 ms      QT Int : 362 ms       P-R-T Axes : 040 043 034 degrees     QTc Int : 438 ms    Normal sinus rhythm  Nonspecific ST and T wave abnormality  Abnormal ECG  When compared with ECG of 25-SEP-2024 11:59,  Significant changes have occurred  Confirmed by Ting Barry MD ([id number]) on 10/12/2024 4:38:32 PM    Referred By: [email or web address]   SELF           Confirmed By:Ting Barry MD       Results for orders placed during the hospital encounter of 10/17/24    Echo    Interpretation Summary    Left Ventricle: Regional wall motion abnormalities present. There is normal systolic function with a visually estimated ejection fraction of 55 %. Grade I diastolic dysfunction. federico septal hypokinesis    Right Ventricle: Normal right ventricular cavity size. Systolic function is normal.    Left Atrium: Left atrium is mildly dilated.    IVC/SVC: Normal venous pressure at 3 mmHg.      Results for orders placed during the hospital encounter of 06/16/24    Nuclear Stress - Cardiology Interpreted    Interpretation Summary    Abnormal myocardial perfusion scan.    There is a severe intensity, medium sized, mostly fixed perfusion abnormality with minimal reversibilty in the mid to apical anterior and apical wall(s) in the typical distribution of the LAD territory.  This is conssistent with scar and minimal periinfarct ischemia.    There are no other significant perfusion abnormalities.    The gated perfusion images showed an ejection fraction of 40% post stress.    There is anteroapical wall akinesis during stress.      Results for orders placed during the hospital  encounter of 09/10/24    Cardiac catheterization    Conclusion    There was non-obstructive coronary artery disease. Patnet LAD stent, Small D1 - , Cx/RCA with luminal irregularities    The post-procedure left ventricular end diastolic pressure was 17.    The procedure log was documented by Documenter: Samira Perez RN and verified by Jemal Drummond MD.    Date: 9/10/2024  Time: 1:37 PM              Cath 9/10/24    There was non-obstructive coronary artery disease. Patent LAD stent, Small D1 -  (jailed), Cx/RCA with luminal irregularities    The post-procedure left ventricular end diastolic pressure was 17.     Echo 7/2/24 (Ao root 4.0cm)    Left Ventricle: The left ventricle is normal in size. Normal wall thickness. Regional wall motion abnormalities and Global hypokinesis present. See diagram for wall motion findings. There is mildly reduced systolic function with a visually estimated ejection fraction of 40 - 45%. Biplane (2D) method of discs ejection fraction is 43%. Global longitudinal strain is -13.0%.    Right Ventricle: Normal right ventricular cavity size. Wall thickness is normal. Systolic function is normal.    Aortic Valve: The aortic valve is a trileaflet valve. There is mild aortic valve sclerosis.    Mitral Valve: There is mild bileaflet sclerosis. There is mild mitral annular calcification present.    Pulmonic Valve: There is mild regurgitation.    Pulmonary Artery: The estimated pulmonary artery systolic pressure is 24 mmHg.    IVC/SVC: Normal venous pressure at 3 mmHg.     L MPI 6/17/24     Abnormal myocardial perfusion scan.    There is a severe intensity, medium sized, mostly fixed perfusion abnormality with minimal reversibilty in the mid to apical anterior and apical wall(s) in the typical distribution of the LAD territory.  This is conssistent with scar and minimal periinfarct ischemia.    There are no other significant perfusion abnormalities.    The gated perfusion images showed an  ejection fraction of 40% post stress.    There is anteroapical wall akinesis during stress.

## 2024-10-18 NOTE — ASSESSMENT & PLAN NOTE
Patient with chest pain.  Recent stress test showed predominantly fixed anterior wall defect with gricelda-infarct ischemia.  Subsequent angiogram revealed nonobstructive CAD.  Now chest pain-free.  Will intensify medical regimen.

## 2024-10-18 NOTE — NURSING
[facility]  Nurses Note -- 4 Eyes      10/17/2024       Skin assessed on: Q Shift      [x] No Pressure Injuries Present    [x]Prevention Measures Documented    [] Yes LDA  for Pressure Injury Previously documented     [] Yes New Pressure Injury Discovered   [] LDA for New Pressure Injury Added      Attending RN:  Jose Luis Seo RN     Second RN:  KANDI Lozano

## 2024-10-21 ENCOUNTER — OFFICE VISIT (OUTPATIENT)
Dept: FAMILY MEDICINE | Facility: CLINIC | Age: 74
End: 2024-10-21
Payer: MEDICARE

## 2024-10-21 ENCOUNTER — PATIENT OUTREACH (OUTPATIENT)
Dept: ADMINISTRATIVE | Facility: CLINIC | Age: 74
End: 2024-10-21
Payer: MEDICARE

## 2024-10-21 VITALS
HEIGHT: 68 IN | BODY MASS INDEX: 21.11 KG/M2 | RESPIRATION RATE: 18 BRPM | HEART RATE: 86 BPM | TEMPERATURE: 98 F | WEIGHT: 139.31 LBS | OXYGEN SATURATION: 94 % | SYSTOLIC BLOOD PRESSURE: 102 MMHG | DIASTOLIC BLOOD PRESSURE: 67 MMHG

## 2024-10-21 DIAGNOSIS — I25.119 CORONARY ARTERY DISEASE INVOLVING NATIVE CORONARY ARTERY OF NATIVE HEART WITH ANGINA PECTORIS: Primary | ICD-10-CM

## 2024-10-21 DIAGNOSIS — I10 ESSENTIAL HYPERTENSION: ICD-10-CM

## 2024-10-21 DIAGNOSIS — J84.9 INTERSTITIAL LUNG DISEASE: ICD-10-CM

## 2024-10-21 DIAGNOSIS — I50.22 CHRONIC HFREF (HEART FAILURE WITH REDUCED EJECTION FRACTION): ICD-10-CM

## 2024-10-21 DIAGNOSIS — K21.9 GASTROESOPHAGEAL REFLUX DISEASE, UNSPECIFIED WHETHER ESOPHAGITIS PRESENT: ICD-10-CM

## 2024-10-21 DIAGNOSIS — E11.9 TYPE 2 DIABETES MELLITUS WITHOUT COMPLICATION, WITHOUT LONG-TERM CURRENT USE OF INSULIN: ICD-10-CM

## 2024-10-21 PROCEDURE — 3052F HG A1C>EQUAL 8.0%<EQUAL 9.0%: CPT | Mod: CPTII,S$GLB,, | Performed by: NURSE PRACTITIONER

## 2024-10-21 PROCEDURE — 1111F DSCHRG MED/CURRENT MED MERGE: CPT | Mod: CPTII,S$GLB,, | Performed by: NURSE PRACTITIONER

## 2024-10-21 PROCEDURE — 3008F BODY MASS INDEX DOCD: CPT | Mod: CPTII,S$GLB,, | Performed by: NURSE PRACTITIONER

## 2024-10-21 PROCEDURE — 3062F POS MACROALBUMINURIA REV: CPT | Mod: CPTII,S$GLB,, | Performed by: NURSE PRACTITIONER

## 2024-10-21 PROCEDURE — 3074F SYST BP LT 130 MM HG: CPT | Mod: CPTII,S$GLB,, | Performed by: NURSE PRACTITIONER

## 2024-10-21 PROCEDURE — 1160F RVW MEDS BY RX/DR IN RCRD: CPT | Mod: CPTII,S$GLB,, | Performed by: NURSE PRACTITIONER

## 2024-10-21 PROCEDURE — 4010F ACE/ARB THERAPY RXD/TAKEN: CPT | Mod: CPTII,S$GLB,, | Performed by: NURSE PRACTITIONER

## 2024-10-21 PROCEDURE — 1101F PT FALLS ASSESS-DOCD LE1/YR: CPT | Mod: CPTII,S$GLB,, | Performed by: NURSE PRACTITIONER

## 2024-10-21 PROCEDURE — 3078F DIAST BP <80 MM HG: CPT | Mod: CPTII,S$GLB,, | Performed by: NURSE PRACTITIONER

## 2024-10-21 PROCEDURE — 2023F DILAT RTA XM W/O RTNOPTHY: CPT | Mod: CPTII,S$GLB,, | Performed by: NURSE PRACTITIONER

## 2024-10-21 PROCEDURE — 3288F FALL RISK ASSESSMENT DOCD: CPT | Mod: CPTII,S$GLB,, | Performed by: NURSE PRACTITIONER

## 2024-10-21 PROCEDURE — 99214 OFFICE O/P EST MOD 30 MIN: CPT | Mod: S$GLB,,, | Performed by: NURSE PRACTITIONER

## 2024-10-21 PROCEDURE — 1126F AMNT PAIN NOTED NONE PRSNT: CPT | Mod: CPTII,S$GLB,, | Performed by: NURSE PRACTITIONER

## 2024-10-21 PROCEDURE — 1159F MED LIST DOCD IN RCRD: CPT | Mod: CPTII,S$GLB,, | Performed by: NURSE PRACTITIONER

## 2024-10-21 PROCEDURE — 99999 PR PBB SHADOW E&M-EST. PATIENT-LVL V: CPT | Mod: PBBFAC,,, | Performed by: NURSE PRACTITIONER

## 2024-10-21 PROCEDURE — 3066F NEPHROPATHY DOC TX: CPT | Mod: CPTII,S$GLB,, | Performed by: NURSE PRACTITIONER

## 2024-10-21 RX ORDER — FLUTICASONE PROPIONATE AND SALMETEROL 250; 50 UG/1; UG/1
1 POWDER RESPIRATORY (INHALATION) 2 TIMES DAILY
COMMUNITY

## 2024-10-21 RX ORDER — NITROGLYCERIN 0.3 MG/1
0.3 TABLET SUBLINGUAL EVERY 5 MIN PRN
Qty: 30 TABLET | Refills: 0 | Status: SHIPPED | OUTPATIENT
Start: 2024-10-21 | End: 2025-10-21

## 2024-10-21 NOTE — PROGRESS NOTES
C3 nurse spoke with Emmanuel Grant for a TCC post hospital discharge follow up call. The patient has a scheduled HOSFU with Corwin Yañez NP ([facility]) on 10/21/24 at 1400. No messages routed at this time.

## 2024-10-21 NOTE — PATIENT INSTRUCTIONS
If no improvement in chest pain OR if chest pain worsens after using nitroglycerin pill, call 911.

## 2024-10-21 NOTE — PROGRESS NOTES
Routine Office Visit    Patient Name: Emmanuel Grant    : 1950  MRN: [id number]    Chief Complaint:  Hospital follow-up    Subjective:  Emmanuel is a 73 y.o. male who presents today for:    Hospital follow-up - patient who is known to me reports today for hospital follow-up.  Since our last appointment, he has gone to the hospital twice with chest pain and shortness for breath.  Most recent discharge summary is as follows in bold:    [facility] Medicine  Discharge Summary        Patient Name: Emmanuel Grant  MRN: [id number]  ALVARO: [id number]  Patient Class: OP- Observation  Admission Date: 10/17/2024  Hospital Length of Stay: 0 days  Discharge Date and Time:  10/18/2024 1:48 PM  Attending Physician: Hui White,*   Discharging Provider: Jasvir Hunt PA-C  Primary Care Provider: Wilfredo De Souza MD     Primary Care Team: Networked reference to record PCT      HPI:   Emmanuel Grant is a 73 y.o. with a pmh of ILD (on home O2), CAD/MI/PCI, HFrEF with EF: 40%, hypertension, DM 2, hyperlipidemia, CVA, and BPH presents to the hospital with complaints of midsternal chest pain described as heaviness/burning after a morning walk today. Patient also complains of associated exertional dyspnea. Per ED note, patient was administered SL nitroglycerin and 324 mg aspirin en route with improvement in pain.  Patient with a history of interstitial lung disease on chronic oxygen at home (2 L). Patient denies any other alleviating or exacerbating factors. Patient denies headache, blurry vision, diaphoresis, nausea, vomiting, diarrhea, melena, hematemesis, or any other associated symptoms.     In the ED: Patient afebrile without leukocytosis, hemodynamically stable on presentation, ALP 54, normal BNP 96, initial troponin normal, chest x-ray with no acute findings.  EKG shows NSR with T-wave inversions in high lateral leads. Case discussed with ED provider and patient will be placed under observation  for further medical management.     * No surgery found *       Hospital Course:   Emmanuel Grant admitted to the hospital for complaints of chest heaviness and shortness on breath. Patient afebrile without leukocytosis, hemodynamically stable on presentation, ALP 54, normal BNP 96, initial troponin normal, chest x-ray with no acute findings. EKG shows NSR with T-wave inversions in high lateral leads.  Cardiology consulted.  Patient chest pain-free troponins and negative.  Recent angiogram revealed nonobstructive CAD.  Cardiology intensified medication regimen by including Imdur 30 mg daily. Patient was explained that his shortness on breath and interstitial lung disease is likely worsening due to continue reflux/hiatal hernia.  Patient was supposed to undergo fundoplication with General surgery and being followed by GI. Patient states that he was scared from the surgery.  Per chart review, patient followed by Pulmonary for ILD and stated that patient's hiatal hernia repair would likely benefit worsening ILD due to reflux.  Recommended that patient follow up with GI/general surgery for further evaluation and follow up for surgical measures.  Patient patient was in agreement stated that he will follow up as soon as he leaves the hospital.  Patient was prescribed Imdur 30 mg daily, which we will be delivered at bedside from pharmacy prior to discharge.  Patient denies any chest pain, shortness on breath, or any other associated symptoms at the moment.  Patient medically stable for discharge.  Return precautions given.     All findings and plan were explained to the patient. All questions and concerns were answered. Patient verbalized understanding. Patient is in stable condition to d/c home and has been informed to follow up with his PCP within the next 7-10 days to discuss his observation stay and to follow-up with his Cardiology and GI.  Patient has been educated to return to the ED if He experiences any further  chest pain, lightheadness, weakness, or discomfort.    Since discharge, patient has been taking Imdur without any problems.  He reports that last week physical therapy did come to his house and evaluated him and he was told that he did not need any physical therapy.  He does not feel weak or fatigued since the most recent hospitalization.  He did get a call today from the home health nursing staff to he will set up a follow-up appointment with them soon.    He states he has an appointment with his pulmonologist coming up in the next week.  He reports that his breathing is stable and is not wearing oxygen currently.  He denies any chest pain, shortness of breath, or any other chest symptoms since the hospitalization.    He states when he gets the chest pain he does get very anxious and when EMS comes the chest pain resolves after use of nitroglycerin.  He does have the gastro PH study coming up next month.    Past Medical History  Past Medical History:   Diagnosis Date    CAD (coronary artery disease)     Sees [facility], h/o stent    Diabetes mellitus     Hypertension     Kidney stone     Stroke     age 62       Family History  Family History   Problem Relation Name Age of Onset    Cancer Mother      Colon cancer Sister      Esophageal cancer Neg Hx         Current Medications  Current Outpatient Medications on File Prior to Visit   Medication Sig Dispense Refill    albuterol-ipratropium (DUO-NEB) 2.5 mg-0.5 mg/3 mL nebulizer solution Take 3 mLs by nebulization every 6 (six) hours as needed for Wheezing. Rescue 75 mL 11    alpha-D-galactosidase (BEANO ORAL) Take 1 tablet by mouth 2 (two) times a day.      amLODIPine (NORVASC) 10 MG tablet Take 1 tablet (10 mg total) by mouth once daily. 90 tablet 3    aspirin 81 MG Chew Take 81 mg by mouth once daily.      atorvastatin (LIPITOR) 40 MG tablet Take 1 tablet (40 mg total) by mouth every evening. 90 tablet 1    azithromycin (Z-ARA) 250 MG tablet Take 2 tablets by mouth on day  "1; Take 1 tablet by mouth on days 2-5 6 tablet 0    cholecalciferol, vitamin D3, (VITAMIN D3) 50 mcg (2,000 unit) Cap capsule Take 1 capsule by mouth once daily.      fluticasone-salmeterol diskus inhaler 250-50 mcg Inhale 1 puff into the lungs 2 (two) times daily. Controller      furosemide (LASIX) 20 MG tablet Take 1 tablet (20 mg total) by mouth once daily. 90 tablet 3    glimepiride (AMARYL) 2 MG tablet Take 1 tablet (2 mg total) by mouth 2 (two) times a day. Take with meals 180 tablet 0    isosorbide mononitrate (IMDUR) 30 MG 24 hr tablet Take 1 tablet (30 mg total) by mouth once daily. 30 tablet 2    metFORMIN (GLUCOPHAGE) 1000 MG tablet Take 1 tablet (1,000 mg total) by mouth 2 (two) times daily with meals.      methylPREDNISolone (MEDROL DOSEPACK) 4 mg tablet use as directed 21 each 0    metoprolol succinate (TOPROL-XL) 50 MG 24 hr tablet Take 1 tablet (50 mg total) by mouth once daily. 90 tablet 0    mirabegron (MYRBETRIQ) 25 mg Tb24 ER tablet Take 1 tablet (25 mg total) by mouth once daily. 30 tablet 11    mycophenolate (CELLCEPT) 250 mg Cap Take 2 capsules (500 mg total) by mouth 2 (two) times daily. 120 capsule 0    omeprazole (PRILOSEC) 40 MG capsule Take 40 mg by mouth every morning.      sacubitriL-valsartan (ENTRESTO) 24-26 mg per tablet Take 1 tablet by mouth 2 (two) times daily. 180 tablet 3    sucralfate (CARAFATE) 1 gram tablet Take 1 tablet (1 g total) by mouth 4 (four) times daily before meals and nightly. 120 tablet 2    tamsulosin (FLOMAX) 0.4 mg Cap Take 1 capsule (0.4 mg total) by mouth once daily.       No current facility-administered medications on file prior to visit.       Allergies   Review of patient's allergies indicates:   Allergen Reactions    Dapagliflozin      Other reaction(s): Other (See Comments)    Pcn [penicillins]     Linagliptin Other (See Comments)     "it knocked me down", "it almost killed me"    Lisinopril Other (See Comments)     cough    Pantoprazole Hives " "      Review of Systems (Pertinent positives)  Review of Systems   Constitutional: Negative.  Negative for chills and fever.   HENT: Negative.  Negative for congestion, sinus pain and sore throat.    Eyes: Negative.    Respiratory:  Negative for cough, shortness of breath and wheezing.    Cardiovascular:  Negative for chest pain, palpitations, orthopnea and claudication.   Gastrointestinal: Negative.  Negative for abdominal pain, diarrhea, nausea and vomiting.   Genitourinary: Negative.  Negative for dysuria, frequency and urgency.   Musculoskeletal: Negative.  Negative for back pain, joint pain and neck pain.   Skin: Negative.    Neurological: Negative.  Negative for dizziness, tingling, loss of consciousness and headaches.   Endo/Heme/Allergies: Negative.    Psychiatric/Behavioral: Negative.         /67 (BP Location: Left arm, Patient Position: Sitting)   Pulse 86   Temp 98.1 °F (36.7 °C) (Oral)   Resp 18   Ht 5' 8" (1.727 m)   Wt 63.2 kg (139 lb 5.3 oz)   SpO2 (!) 94%   BMI 21.19 kg/m²     Physical Exam  Vitals reviewed.   Constitutional:       General: He is not in acute distress.     Appearance: Normal appearance. He is not ill-appearing, toxic-appearing or diaphoretic.   HENT:      Head: Normocephalic and atraumatic.   Cardiovascular:      Rate and Rhythm: Normal rate and regular rhythm.      Pulses: Normal pulses.      Heart sounds: Normal heart sounds.   Pulmonary:      Effort: Pulmonary effort is normal. No respiratory distress.      Breath sounds: Normal breath sounds. No wheezing.   Abdominal:      General: Bowel sounds are normal. There is no distension.      Palpations: Abdomen is soft.      Tenderness: There is no abdominal tenderness.   Musculoskeletal:         General: No swelling, tenderness or deformity. Normal range of motion.   Skin:     General: Skin is warm and dry.      Capillary Refill: Capillary refill takes less than 2 seconds.   Neurological:      General: No focal deficit " present.      Mental Status: He is alert and oriented to person, place, and time.   Psychiatric:         Mood and Affect: Mood normal.         Behavior: Behavior normal.            Assessment/Plan:  Emmanuel Grant is a 73 y.o. male who presents today for :    Emmanuel was seen today for follow-up.    Diagnoses and all orders for this visit:    Coronary artery disease involving native coronary artery of native heart with angina pectoris  -     nitroGLYCERIN (NITROSTAT) 0.3 MG SL tablet; Place 1 tablet (0.3 mg total) under the tongue every 5 (five) minutes as needed for Chest pain.    Will prescribe p.r.n. nitroglycerin which patient can have at home.  Verified this with patient's cardiologist Dr. Ventura.  Discussed with patient that if he does not get any improvement with the nitroglycerin or of the chest pain worsens with taking it then he needs to call 911 immediately.  He will continue Imdur and other medications as prescribed.    Essential hypertension    Stable on current medications.  Continue.    Interstitial lung disease    Stable off of oxygen now.  Recommended patient to contact his pulmonologist to verify follow-up.    Chronic HFrEF (heart failure with reduced ejection fraction)    Clinically euvolemic.  EF improved from most recent hospitalization.    Type 2 diabetes mellitus without complication, without long-term current use of insulin    Patient is currently taking steroids for I LD.  Glucose monitor in place shows glucose  only 30% of the time.  He will continue current medications and follow up with endocrinology as scheduled.    Gastroesophageal reflux disease, unspecified whether esophagitis present    Recommended follow up with PH study as scheduled.    All questions answered.  Follow-up 2 weeks for audio call.        This office note has been dictated.  This dictation has been generated using M-Modal Fluency Direct dictation; some phonetic errors may occur.

## 2024-10-30 ENCOUNTER — TELEPHONE (OUTPATIENT)
Dept: ENDOSCOPY | Facility: HOSPITAL | Age: 74
End: 2024-10-30
Payer: MEDICARE

## 2024-11-04 ENCOUNTER — OFFICE VISIT (OUTPATIENT)
Dept: FAMILY MEDICINE | Facility: CLINIC | Age: 74
End: 2024-11-04
Payer: MEDICARE

## 2024-11-04 DIAGNOSIS — J96.11 CHRONIC HYPOXEMIC RESPIRATORY FAILURE: Primary | ICD-10-CM

## 2024-11-04 DIAGNOSIS — I50.22 CHRONIC HFREF (HEART FAILURE WITH REDUCED EJECTION FRACTION): ICD-10-CM

## 2024-11-04 DIAGNOSIS — I25.119 CORONARY ARTERY DISEASE INVOLVING NATIVE CORONARY ARTERY OF NATIVE HEART WITH ANGINA PECTORIS: ICD-10-CM

## 2024-11-04 PROCEDURE — 99442 PR PHYSICIAN TELEPHONE EVALUATION 11-20 MIN: CPT | Mod: HCNC,95,, | Performed by: NURSE PRACTITIONER

## 2024-11-04 PROCEDURE — 2023F DILAT RTA XM W/O RTNOPTHY: CPT | Mod: HCNC,CPTII,95, | Performed by: NURSE PRACTITIONER

## 2024-11-04 PROCEDURE — 3052F HG A1C>EQUAL 8.0%<EQUAL 9.0%: CPT | Mod: HCNC,CPTII,95, | Performed by: NURSE PRACTITIONER

## 2024-11-04 PROCEDURE — 3066F NEPHROPATHY DOC TX: CPT | Mod: HCNC,CPTII,95, | Performed by: NURSE PRACTITIONER

## 2024-11-04 PROCEDURE — 3062F POS MACROALBUMINURIA REV: CPT | Mod: HCNC,CPTII,95, | Performed by: NURSE PRACTITIONER

## 2024-11-04 PROCEDURE — 4010F ACE/ARB THERAPY RXD/TAKEN: CPT | Mod: HCNC,CPTII,95, | Performed by: NURSE PRACTITIONER

## 2024-11-04 NOTE — PROGRESS NOTES
Established Patient - Audio Only Telehealth Visit     The patient location is:  [place]  The chief complaint leading to consultation is:  Follow-up/shortness of breath  Visit type: Virtual visit with audio only (telephone)  Total time spent with patient: 11 minutwa       The reason for the audio only service rather than synchronous audio and video virtual visit was related to technical difficulties or patient preference/necessity.     Each patient to whom I provide medical services by telemedicine is:  (1) informed of the relationship between the physician and patient and the respective role of any other health care provider with respect to management of the patient; and (2) notified that they may decline to receive medical services by telemedicine and may withdraw from such care at any time. Patient verbally consented to receive this service via voice-only telephone call.       HPI:  Patient who is known to me reports today for an audio visit for evaluation.  Since our last visit he has not needed to use the nitroglycerin and has been taking all of his medications.  He reports having more dyspnea and a cough in the last few days without fevers or chest pain or palpitations.  He is only using 2 L of oxygen at night and when he goes out during the day.  He has no other acute problems or concerns.     Assessment and plan: ILD, CAD, HF - Patient has been chest pain-free since last appointment.  Reports some worsening dyspnea in the last few days.  Recommended patient to use his oxygen all day to help with the shortness of breath.  Ultimately recommended evaluation with pulmonology given patient's chronic symptoms.  I did give him the number to call and schedule a follow up with them and I will try to reach out to his pulmonologist to see when they can fit him in for a sooner appointment.  While on questions answered.  If symptoms worsen acutely he will need to be seen in person.                   This service was not  originating from a related E/M service provided within the previous 7 days nor will  to an E/M service or procedure within the next 24 hours or my soonest available appointment.  Prevailing standard of care was able to be met in this audio-only visit.

## 2024-11-05 ENCOUNTER — HOSPITAL ENCOUNTER (INPATIENT)
Facility: HOSPITAL | Age: 74
LOS: 5 days | Discharge: HOME | DRG: 196 | End: 2024-11-11
Attending: EMERGENCY MEDICINE | Admitting: STUDENT IN AN ORGANIZED HEALTH CARE EDUCATION/TRAINING PROGRAM
Payer: MEDICARE

## 2024-11-05 ENCOUNTER — TELEPHONE (OUTPATIENT)
Dept: FAMILY MEDICINE | Facility: CLINIC | Age: 74
End: 2024-11-05
Payer: MEDICARE

## 2024-11-05 DIAGNOSIS — R06.02 SOB (SHORTNESS OF BREATH): ICD-10-CM

## 2024-11-05 DIAGNOSIS — J84.9 INTERSTITIAL LUNG DISEASE: ICD-10-CM

## 2024-11-05 DIAGNOSIS — R07.9 CHEST PAIN: ICD-10-CM

## 2024-11-05 DIAGNOSIS — E87.6 HYPOKALEMIA: ICD-10-CM

## 2024-11-05 DIAGNOSIS — J96.90 RESPIRATORY FAILURE: ICD-10-CM

## 2024-11-05 DIAGNOSIS — J84.9 INTERSTITIAL PULMONARY DISEASE: Primary | ICD-10-CM

## 2024-11-05 LAB
ALBUMIN SERPL-MCNC: 3.5 G/DL (ref 3.3–5.5)
ALLENS TEST: ABNORMAL
ALLENS TEST: ABNORMAL
ALP SERPL-CCNC: 54 U/L (ref 42–141)
BASOPHILS # BLD AUTO: 0 K/UL (ref 0–0.2)
BASOPHILS NFR BLD: 0 % (ref 0–1.9)
BILIRUB SERPL-MCNC: 0.9 MG/DL (ref 0.2–1.6)
BILIRUBIN, POC UA: NEGATIVE
BLOOD, POC UA: ABNORMAL
BUN SERPL-MCNC: 10 MG/DL (ref 7–22)
CALCIUM SERPL-MCNC: 10.3 MG/DL (ref 8–10.3)
CHLORIDE SERPL-SCNC: 98 MMOL/L (ref 98–108)
CLARITY, UA: CLEAR
COLOR, UA: YELLOW
CREAT SERPL-MCNC: 0.9 MG/DL (ref 0.6–1.2)
CTP QC/QA: YES
DIFFERENTIAL METHOD BLD: ABNORMAL
EOSINOPHIL # BLD AUTO: 0 K/UL (ref 0–0.5)
EOSINOPHIL NFR BLD: 0 % (ref 0–8)
ERYTHROCYTE [DISTWIDTH] IN BLOOD BY AUTOMATED COUNT: 13.9 % (ref 11.5–14.5)
GLUCOSE SERPL-MCNC: 147 MG/DL (ref 73–118)
GLUCOSE, POC UA: ABNORMAL
HCO3 UR-SCNC: 23.5 MMOL/L (ref 24–28)
HCO3 UR-SCNC: 23.8 MMOL/L (ref 24–28)
HCT VFR BLD AUTO: 40.7 % (ref 40–54)
HCT, POC: NORMAL
HGB BLD-MCNC: 13.8 G/DL (ref 14–18)
HGB, POC: NORMAL (ref 14–18)
IMM GRANULOCYTES # BLD AUTO: 0.02 K/UL (ref 0–0.04)
IMM GRANULOCYTES NFR BLD AUTO: 0.4 % (ref 0–0.5)
INFLUENZA A ANTIGEN, POC: NEGATIVE
INFLUENZA B ANTIGEN, POC: NEGATIVE
INR PPP: 1.2 (ref 0.8–1.2)
KETONES, POC UA: NEGATIVE
LACTATE SERPL-SCNC: 5.5 MMOL/L (ref 0.5–2.2)
LDH SERPL L TO P-CCNC: 4.59 MMOL/L (ref 0.5–2.2)
LDH SERPL L TO P-CCNC: 5.24 MMOL/L (ref 0.5–2.2)
LEUKOCYTE EST, POC UA: NEGATIVE
LYMPHOCYTES # BLD AUTO: 0.7 K/UL (ref 1–4.8)
LYMPHOCYTES NFR BLD: 12.2 % (ref 18–48)
MCH RBC QN AUTO: 28.6 PG (ref 27–31)
MCH, POC: NORMAL
MCHC RBC AUTO-ENTMCNC: 33.9 G/DL (ref 32–36)
MCHC, POC: NORMAL
MCV RBC AUTO: 84 FL (ref 82–98)
MCV, POC: NORMAL
MONOCYTES # BLD AUTO: 0 K/UL (ref 0.3–1)
MONOCYTES NFR BLD: 0.7 % (ref 4–15)
MPV, POC: NORMAL
NEUTROPHILS # BLD AUTO: 4.8 K/UL (ref 1.8–7.7)
NEUTROPHILS NFR BLD: 86.7 % (ref 38–73)
NITRITE, POC UA: NEGATIVE
NRBC BLD-RTO: 0 /100 WBC
OHS QRS DURATION: 80 MS
OHS QTC CALCULATION: 443 MS
PCO2 BLDA: 41 MMHG (ref 35–45)
PCO2 BLDA: 43.4 MMHG (ref 35–45)
PH SMN: 7.35 [PH] (ref 7.35–7.45)
PH SMN: 7.37 [PH] (ref 7.35–7.45)
PH UR STRIP: 7 [PH] (ref 5–8)
PLATELET # BLD AUTO: 180 K/UL (ref 150–450)
PMV BLD AUTO: 9.7 FL (ref 9.2–12.9)
PO2 BLDA: 25 MMHG (ref 40–60)
PO2 BLDA: 33 MMHG (ref 40–60)
POC ALT (SGPT): 19 U/L (ref 10–47)
POC AST (SGOT): 26 U/L (ref 11–38)
POC B-TYPE NATRIURETIC PEPTIDE: 99.5 PG/ML (ref 0–100)
POC BE: -2 MMOL/L
POC BE: -2 MMOL/L
POC CARDIAC TROPONIN I: 0.01 NG/ML (ref 0–0.08)
POC D-DI: 148 NG/ML (ref 0–450)
POC PLATELET COUNT: NORMAL
POC RAPID STREP A: NEGATIVE
POC SATURATED O2: 43 % (ref 95–100)
POC SATURATED O2: 61 % (ref 95–100)
POC TCO2: 25 MMOL/L (ref 24–29)
POC TCO2: 25 MMOL/L (ref 24–29)
POC TCO2: 27 MMOL/L (ref 18–33)
POCT GLUCOSE: 157 MG/DL (ref 70–110)
POTASSIUM BLD-SCNC: 4.2 MMOL/L (ref 3.6–5.1)
PROCALCITONIN SERPL IA-MCNC: 0.07 NG/ML
PROCALCITONIN SERPL IA-MCNC: 0.07 NG/ML
PROTEIN, POC UA: ABNORMAL
PROTEIN, POC: 7 G/DL (ref 6.4–8.1)
PROTHROMBIN TIME: 12.5 SEC (ref 9–12.5)
RBC # BLD AUTO: 4.82 M/UL (ref 4.6–6.2)
RBC, POC: NORMAL
RDW, POC: NORMAL
SAMPLE: ABNORMAL
SAMPLE: ABNORMAL
SAMPLE: NORMAL
SARS-COV-2 RDRP RESP QL NAA+PROBE: NEGATIVE
SITE: ABNORMAL
SITE: ABNORMAL
SODIUM BLD-SCNC: 142 MMOL/L (ref 128–145)
SPECIFIC GRAVITY, POC UA: 1.01 (ref 1–1.03)
UROBILINOGEN, POC UA: 0.2 E.U./DL
WBC # BLD AUTO: 5.51 K/UL (ref 3.9–12.7)
WBC, POC: NORMAL

## 2024-11-05 PROCEDURE — 93010 ELECTROCARDIOGRAM REPORT: CPT | Mod: HCNC,,, | Performed by: INTERNAL MEDICINE

## 2024-11-05 PROCEDURE — 85025 COMPLETE CBC W/AUTO DIFF WBC: CPT | Mod: HCNC,ER

## 2024-11-05 PROCEDURE — G0378 HOSPITAL OBSERVATION PER HR: HCPCS | Mod: HCNC,ER

## 2024-11-05 PROCEDURE — 87635 SARS-COV-2 COVID-19 AMP PRB: CPT | Mod: HCNC,ER | Performed by: EMERGENCY MEDICINE

## 2024-11-05 PROCEDURE — 94761 N-INVAS EAR/PLS OXIMETRY MLT: CPT | Mod: ER,XB

## 2024-11-05 PROCEDURE — 83605 ASSAY OF LACTIC ACID: CPT | Mod: HCNC | Performed by: INTERNAL MEDICINE

## 2024-11-05 PROCEDURE — 84145 PROCALCITONIN (PCT): CPT | Mod: 91,HCNC | Performed by: INTERNAL MEDICINE

## 2024-11-05 PROCEDURE — 63600175 PHARM REV CODE 636 W HCPCS: Mod: ER | Performed by: EMERGENCY MEDICINE

## 2024-11-05 PROCEDURE — G0378 HOSPITAL OBSERVATION PER HR: HCPCS | Mod: HCNC

## 2024-11-05 PROCEDURE — 99285 EMERGENCY DEPT VISIT HI MDM: CPT | Mod: 25,HCNC,ER

## 2024-11-05 PROCEDURE — 96374 THER/PROPH/DIAG INJ IV PUSH: CPT | Mod: HCNC,ER

## 2024-11-05 PROCEDURE — 84145 PROCALCITONIN (PCT): CPT | Mod: HCNC | Performed by: EMERGENCY MEDICINE

## 2024-11-05 PROCEDURE — 80053 COMPREHEN METABOLIC PANEL: CPT | Mod: HCNC,ER

## 2024-11-05 PROCEDURE — 93010 ELECTROCARDIOGRAM REPORT: CPT | Mod: HCNC,76,, | Performed by: INTERNAL MEDICINE

## 2024-11-05 PROCEDURE — 82803 BLOOD GASES ANY COMBINATION: CPT | Mod: HCNC,ER

## 2024-11-05 PROCEDURE — 63600175 PHARM REV CODE 636 W HCPCS: Mod: HCNC | Performed by: INTERNAL MEDICINE

## 2024-11-05 PROCEDURE — 94640 AIRWAY INHALATION TREATMENT: CPT | Mod: ER

## 2024-11-05 PROCEDURE — 81003 URINALYSIS AUTO W/O SCOPE: CPT | Mod: HCNC,ER

## 2024-11-05 PROCEDURE — 93005 ELECTROCARDIOGRAM TRACING: CPT | Mod: HCNC

## 2024-11-05 PROCEDURE — 82962 GLUCOSE BLOOD TEST: CPT | Mod: HCNC,ER

## 2024-11-05 PROCEDURE — 27000221 HC OXYGEN, UP TO 24 HOURS: Mod: ER

## 2024-11-05 PROCEDURE — 85025 COMPLETE CBC W/AUTO DIFF WBC: CPT | Mod: HCNC | Performed by: INTERNAL MEDICINE

## 2024-11-05 PROCEDURE — 96375 TX/PRO/DX INJ NEW DRUG ADDON: CPT | Mod: HCNC,ER

## 2024-11-05 PROCEDURE — 83880 ASSAY OF NATRIURETIC PEPTIDE: CPT | Mod: HCNC,ER

## 2024-11-05 PROCEDURE — 36415 COLL VENOUS BLD VENIPUNCTURE: CPT | Mod: HCNC | Performed by: INTERNAL MEDICINE

## 2024-11-05 PROCEDURE — 96372 THER/PROPH/DIAG INJ SC/IM: CPT

## 2024-11-05 PROCEDURE — 94640 AIRWAY INHALATION TREATMENT: CPT | Mod: HCNC,ER,XB

## 2024-11-05 PROCEDURE — 85379 FIBRIN DEGRADATION QUANT: CPT | Mod: HCNC,ER

## 2024-11-05 PROCEDURE — 63600175 PHARM REV CODE 636 W HCPCS: Mod: HCNC,ER

## 2024-11-05 PROCEDURE — 93005 ELECTROCARDIOGRAM TRACING: CPT | Mod: ER

## 2024-11-05 PROCEDURE — 25000003 PHARM REV CODE 250: Mod: HCNC,ER | Performed by: EMERGENCY MEDICINE

## 2024-11-05 PROCEDURE — 25000242 PHARM REV CODE 250 ALT 637 W/ HCPCS: Mod: HCNC,ER | Performed by: EMERGENCY MEDICINE

## 2024-11-05 PROCEDURE — 84484 ASSAY OF TROPONIN QUANT: CPT | Mod: HCNC | Performed by: INTERNAL MEDICINE

## 2024-11-05 PROCEDURE — 85610 PROTHROMBIN TIME: CPT | Mod: HCNC | Performed by: INTERNAL MEDICINE

## 2024-11-05 PROCEDURE — 87040 BLOOD CULTURE FOR BACTERIA: CPT | Mod: HCNC | Performed by: EMERGENCY MEDICINE

## 2024-11-05 PROCEDURE — 25000003 PHARM REV CODE 250: Mod: HCNC | Performed by: INTERNAL MEDICINE

## 2024-11-05 RX ORDER — BUDESONIDE 0.5 MG/2ML
1 INHALANT ORAL
Status: DISCONTINUED | OUTPATIENT
Start: 2024-11-05 | End: 2024-11-11 | Stop reason: HOSPADM

## 2024-11-05 RX ORDER — METHYLPREDNISOLONE SOD SUCC 125 MG
125 VIAL (EA) INJECTION
Status: COMPLETED | OUTPATIENT
Start: 2024-11-05 | End: 2024-11-05

## 2024-11-05 RX ORDER — LEVOFLOXACIN 5 MG/ML
750 INJECTION, SOLUTION INTRAVENOUS
Status: COMPLETED | OUTPATIENT
Start: 2024-11-05 | End: 2024-11-05

## 2024-11-05 RX ORDER — SODIUM CHLORIDE 0.9 % (FLUSH) 0.9 %
3 SYRINGE (ML) INJECTION
Status: DISCONTINUED | OUTPATIENT
Start: 2024-11-05 | End: 2024-11-05

## 2024-11-05 RX ORDER — INSULIN ASPART 100 [IU]/ML
0-5 INJECTION, SOLUTION INTRAVENOUS; SUBCUTANEOUS
Status: DISCONTINUED | OUTPATIENT
Start: 2024-11-06 | End: 2024-11-06

## 2024-11-05 RX ORDER — LEVALBUTEROL 1.25 MG/.5ML
1.25 SOLUTION, CONCENTRATE RESPIRATORY (INHALATION) EVERY 4 HOURS PRN
Status: DISCONTINUED | OUTPATIENT
Start: 2024-11-05 | End: 2024-11-11 | Stop reason: HOSPADM

## 2024-11-05 RX ORDER — IPRATROPIUM BROMIDE AND ALBUTEROL SULFATE 2.5; .5 MG/3ML; MG/3ML
3 SOLUTION RESPIRATORY (INHALATION) EVERY 4 HOURS PRN
Status: DISCONTINUED | OUTPATIENT
Start: 2024-11-05 | End: 2024-11-05

## 2024-11-05 RX ORDER — ATORVASTATIN CALCIUM 40 MG/1
40 TABLET, FILM COATED ORAL NIGHTLY
Status: DISCONTINUED | OUTPATIENT
Start: 2024-11-06 | End: 2024-11-11 | Stop reason: HOSPADM

## 2024-11-05 RX ORDER — PREDNISONE 20 MG/1
40 TABLET ORAL DAILY
Status: DISCONTINUED | OUTPATIENT
Start: 2024-11-06 | End: 2024-11-06

## 2024-11-05 RX ORDER — IPRATROPIUM BROMIDE AND ALBUTEROL SULFATE 2.5; .5 MG/3ML; MG/3ML
3 SOLUTION RESPIRATORY (INHALATION) ONCE
Status: COMPLETED | OUTPATIENT
Start: 2024-11-05 | End: 2024-11-05

## 2024-11-05 RX ORDER — AZITHROMYCIN 250 MG/1
250 TABLET, FILM COATED ORAL DAILY
Status: DISCONTINUED | OUTPATIENT
Start: 2024-11-06 | End: 2024-11-05

## 2024-11-05 RX ORDER — IBUPROFEN 200 MG
24 TABLET ORAL
Status: DISCONTINUED | OUTPATIENT
Start: 2024-11-06 | End: 2024-11-06

## 2024-11-05 RX ORDER — IBUPROFEN 200 MG
16 TABLET ORAL
Status: DISCONTINUED | OUTPATIENT
Start: 2024-11-06 | End: 2024-11-06

## 2024-11-05 RX ORDER — AMLODIPINE BESYLATE 5 MG/1
10 TABLET ORAL DAILY
Status: DISCONTINUED | OUTPATIENT
Start: 2024-11-06 | End: 2024-11-11 | Stop reason: HOSPADM

## 2024-11-05 RX ORDER — MYCOPHENOLATE MOFETIL 250 MG/1
500 CAPSULE ORAL 2 TIMES DAILY
Status: DISCONTINUED | OUTPATIENT
Start: 2024-11-05 | End: 2024-11-06

## 2024-11-05 RX ORDER — FUROSEMIDE 10 MG/ML
40 INJECTION INTRAMUSCULAR; INTRAVENOUS DAILY
Status: DISCONTINUED | OUTPATIENT
Start: 2024-11-06 | End: 2024-11-11 | Stop reason: HOSPADM

## 2024-11-05 RX ORDER — AZITHROMYCIN 250 MG/1
500 TABLET, FILM COATED ORAL ONCE
Status: DISCONTINUED | OUTPATIENT
Start: 2024-11-05 | End: 2024-11-05

## 2024-11-05 RX ORDER — LEVALBUTEROL INHALATION SOLUTION 0.63 MG/3ML
0.63 SOLUTION RESPIRATORY (INHALATION) EVERY 6 HOURS
Status: DISCONTINUED | OUTPATIENT
Start: 2024-11-06 | End: 2024-11-06

## 2024-11-05 RX ORDER — FUROSEMIDE 10 MG/ML
40 INJECTION INTRAMUSCULAR; INTRAVENOUS
Status: COMPLETED | OUTPATIENT
Start: 2024-11-05 | End: 2024-11-05

## 2024-11-05 RX ORDER — ENOXAPARIN SODIUM 100 MG/ML
40 INJECTION SUBCUTANEOUS EVERY 24 HOURS
Status: DISCONTINUED | OUTPATIENT
Start: 2024-11-05 | End: 2024-11-11 | Stop reason: HOSPADM

## 2024-11-05 RX ORDER — TAMSULOSIN HYDROCHLORIDE 0.4 MG/1
0.4 CAPSULE ORAL DAILY
Status: DISCONTINUED | OUTPATIENT
Start: 2024-11-06 | End: 2024-11-11 | Stop reason: HOSPADM

## 2024-11-05 RX ORDER — ISOSORBIDE MONONITRATE 30 MG/1
30 TABLET, EXTENDED RELEASE ORAL DAILY
Status: DISCONTINUED | OUTPATIENT
Start: 2024-11-06 | End: 2024-11-11 | Stop reason: HOSPADM

## 2024-11-05 RX ORDER — SUCRALFATE 1 G/1
1 TABLET ORAL
Status: DISCONTINUED | OUTPATIENT
Start: 2024-11-06 | End: 2024-11-11 | Stop reason: HOSPADM

## 2024-11-05 RX ORDER — METOPROLOL SUCCINATE 50 MG/1
50 TABLET, EXTENDED RELEASE ORAL DAILY
Status: DISCONTINUED | OUTPATIENT
Start: 2024-11-06 | End: 2024-11-11 | Stop reason: HOSPADM

## 2024-11-05 RX ORDER — NITROGLYCERIN 0.3 MG/1
0.3 TABLET SUBLINGUAL EVERY 5 MIN PRN
Status: DISCONTINUED | OUTPATIENT
Start: 2024-11-06 | End: 2024-11-05

## 2024-11-05 RX ORDER — LEVOFLOXACIN 5 MG/ML
750 INJECTION, SOLUTION INTRAVENOUS
Status: DISCONTINUED | OUTPATIENT
Start: 2024-11-06 | End: 2024-11-11 | Stop reason: HOSPADM

## 2024-11-05 RX ORDER — HYDRALAZINE HYDROCHLORIDE 20 MG/ML
5 INJECTION INTRAMUSCULAR; INTRAVENOUS EVERY 6 HOURS PRN
Status: DISCONTINUED | OUTPATIENT
Start: 2024-11-05 | End: 2024-11-11 | Stop reason: HOSPADM

## 2024-11-05 RX ORDER — FLUTICASONE FUROATE AND VILANTEROL 200; 25 UG/1; UG/1
1 POWDER RESPIRATORY (INHALATION) DAILY
Status: DISCONTINUED | OUTPATIENT
Start: 2024-11-06 | End: 2024-11-05

## 2024-11-05 RX ORDER — NITROGLYCERIN 0.4 MG/1
0.4 TABLET SUBLINGUAL EVERY 5 MIN PRN
Status: DISCONTINUED | OUTPATIENT
Start: 2024-11-06 | End: 2024-11-11 | Stop reason: HOSPADM

## 2024-11-05 RX ORDER — FAMOTIDINE 20 MG/1
40 TABLET, FILM COATED ORAL DAILY
Status: DISCONTINUED | OUTPATIENT
Start: 2024-11-06 | End: 2024-11-11 | Stop reason: HOSPADM

## 2024-11-05 RX ORDER — ARFORMOTEROL TARTRATE 15 UG/2ML
15 SOLUTION RESPIRATORY (INHALATION)
Status: DISCONTINUED | OUTPATIENT
Start: 2024-11-05 | End: 2024-11-11 | Stop reason: HOSPADM

## 2024-11-05 RX ORDER — NAPROXEN SODIUM 220 MG/1
81 TABLET, FILM COATED ORAL DAILY
Status: DISCONTINUED | OUTPATIENT
Start: 2024-11-06 | End: 2024-11-11 | Stop reason: HOSPADM

## 2024-11-05 RX ORDER — GLUCAGON 1 MG
1 KIT INJECTION
Status: DISCONTINUED | OUTPATIENT
Start: 2024-11-06 | End: 2024-11-06

## 2024-11-05 RX ADMIN — IPRATROPIUM BROMIDE AND ALBUTEROL SULFATE 3 ML: .5; 3 SOLUTION RESPIRATORY (INHALATION) at 04:11

## 2024-11-05 RX ADMIN — IPRATROPIUM BROMIDE AND ALBUTEROL SULFATE 3 ML: .5; 3 SOLUTION RESPIRATORY (INHALATION) at 03:11

## 2024-11-05 RX ADMIN — FUROSEMIDE 40 MG: 10 INJECTION, SOLUTION INTRAVENOUS at 04:11

## 2024-11-05 RX ADMIN — SODIUM CHLORIDE 1000 ML: 9 INJECTION, SOLUTION INTRAVENOUS at 06:11

## 2024-11-05 RX ADMIN — LEVOFLOXACIN 750 MG: 750 INJECTION, SOLUTION INTRAVENOUS at 05:11

## 2024-11-05 RX ADMIN — METHYLPREDNISOLONE SODIUM SUCCINATE 125 MG: 125 INJECTION, POWDER, FOR SOLUTION INTRAMUSCULAR; INTRAVENOUS at 01:11

## 2024-11-05 RX ADMIN — SACUBITRIL AND VALSARTAN 1 TABLET: 24; 26 TABLET, FILM COATED ORAL at 11:11

## 2024-11-05 RX ADMIN — IPRATROPIUM BROMIDE AND ALBUTEROL SULFATE 3 ML: .5; 3 SOLUTION RESPIRATORY (INHALATION) at 01:11

## 2024-11-05 RX ADMIN — SODIUM CHLORIDE 1000 ML: 9 INJECTION, SOLUTION INTRAVENOUS at 11:11

## 2024-11-05 RX ADMIN — ENOXAPARIN SODIUM 40 MG: 40 INJECTION SUBCUTANEOUS at 06:11

## 2024-11-05 RX ADMIN — MYCOPHENOLATE MOFETIL 500 MG: 250 CAPSULE ORAL at 11:11

## 2024-11-05 NOTE — ED PROVIDER NOTES
"Encounter Date: 11/5/2024    SCRIBE #1 NOTE: I, Lian Singh, am scribing for, and in the presence of,  Ivette Reid DO.       History     Chief Complaint   Patient presents with    Shortness of Breath     Pt reports a cough this am,  Pt reports SOB since walking in   Pt huffing and puffing in triage  Pt unable to complete a sentence without having to gasping for air   Pt supposed to be on 2 liters nasal cannula but did not bring it with him      Emmanuel Grant is a 73 y.o. male with Hx of CAD (stents in place), DM, HTN, and CVA, who presents to the ED for chief complaint of cough that began this morning after eating cereal. Patient also reports shortness of breath and cannot speak in full sentences upon arrival. Patient on 2L home O2, but did not bring it with his today because he was rushing to get to ED. Denies leg swelling or choking.       The history is provided by the patient. No  was used.     Review of patient's allergies indicates:   Allergen Reactions    Dapagliflozin      Other reaction(s): Other (See Comments)    Pcn [penicillins]     Linagliptin Other (See Comments)     "it knocked me down", "it almost killed me"    Lisinopril Other (See Comments)     cough    Pantoprazole Hives     Past Medical History:   Diagnosis Date    CAD (coronary artery disease)     Sees [facility], h/o stent    Diabetes mellitus     Hypertension     Kidney stone     Stroke     age 62     Past Surgical History:   Procedure Laterality Date    CARDIAC CATHETERIZATION      with stent    COLONOSCOPY N/A 03/27/2024    Procedure: COLONOSCOPY;  Surgeon: Ariela Castro MD;  Location: [facility] ENDO;  Service: Endoscopy;  Laterality: N/A;    ESOPHAGOGASTRODUODENOSCOPY N/A 03/27/2024    Procedure: EGD (ESOPHAGOGASTRODUODENOSCOPY);  Surgeon: Ariela Castro MD;  Location: [facility];  Service: Endoscopy;  Laterality: N/A;    LEFT HEART CATHETERIZATION Left 9/10/2024    Procedure: Left heart cath;  Surgeon: " Jemal Drummond MD;  Location: [facility] CATH LAB;  Service: Cardiology;  Laterality: Left;    SHOULDER SURGERY Right      Family History   Problem Relation Name Age of Onset    Cancer Mother      Colon cancer Sister      Esophageal cancer Neg Hx       Social History     Tobacco Use    Smoking status: Never     Passive exposure: Never    Smokeless tobacco: Never   Substance Use Topics    Alcohol use: No    Drug use: Never     Review of Systems   Constitutional:  Negative for fever.   HENT:  Negative for rhinorrhea and sore throat.    Respiratory:  Positive for cough and shortness of breath. Negative for choking.    Cardiovascular:  Negative for leg swelling.   Skin:  Negative for rash.   Neurological:  Negative for numbness.   All other systems reviewed and are negative.      Physical Exam     Initial Vitals [11/05/24 1303]   BP Pulse Resp Temp SpO2   121/77 (!) 112 (S) (!) 26 98.4 °F (36.9 °C) (S) (!) 88 %      MAP       --         Physical Exam    Nursing note and vitals reviewed.  Constitutional: He appears well-developed and well-nourished.   HENT:   Head: Normocephalic and atraumatic.   Right Ear: External ear normal.   Left Ear: External ear normal.   Nose: Nose normal. Mouth/Throat: Oropharynx is clear and moist.   Eyes: Conjunctivae and EOM are normal. Pupils are equal, round, and reactive to light.   Neck: Neck supple.   Normal range of motion.  Cardiovascular:  Regular rhythm and normal heart sounds.   Tachycardia present.   Exam reveals no gallop and no friction rub.       No murmur heard.  Pulmonary/Chest: Tachypnea noted. He is in respiratory distress. He has no wheezes. He has no rhonchi. He has rales (bilaterally).   Abdominal: Abdomen is soft. Bowel sounds are normal. There is no abdominal tenderness. There is no rebound and no guarding.   Musculoskeletal:         General: No tenderness or edema. Normal range of motion.      Cervical back: Normal range of motion and neck supple.      Comments: Trace  edema to bilateral lower extremities.      Neurological: He is alert and oriented to person, place, and time. No cranial nerve deficit.   Skin: Skin is warm and dry. Capillary refill takes less than 2 seconds. No rash noted.   Psychiatric: He has a normal mood and affect. His behavior is normal.         ED Course   Critical Care    Date/Time: 11/5/2024 3:24 PM    Performed by: Ivette Reid DO  Authorized by: Ivette Reid DO  Direct patient critical care time: 8 minutes  Additional history critical care time: 8 minutes  Ordering / reviewing critical care time: 8 minutes  Documentation critical care time: 8 minutes  Total critical care time (exclusive of procedural time) : 32 minutes  Critical care was necessary to treat or prevent imminent or life-threatening deterioration of the following conditions: respiratory failure.  Critical care was time spent personally by me on the following activities: evaluation of patient's response to treatment, examination of patient, obtaining history from patient or surrogate, ordering and performing treatments and interventions, ordering and review of laboratory studies, ordering and review of radiographic studies, pulse oximetry, re-evaluation of patient's condition and review of old charts.        Labs Reviewed   POCT URINALYSIS W/O SCOPE - Abnormal       Result Value    Glucose, UA Trace (*)     Bilirubin, UA Negative      Ketones, UA Negative      Spec Grav UA 1.015      Blood, UA Trace-lysed (*)     PH, UA 7.0      Protein, UA 2+ (*)     Urobilinogen, UA 0.2      Nitrite, UA Negative      Leukocytes, UA Negative      Color, UA POC Yellow      Clarity, UA, POC Clear     POCT GLUCOSE - Abnormal    POCT Glucose 157 (*)    POCT CMP - Abnormal    Albumin, POC 3.5      Alkaline Phosphatase, POC 54      ALT (SGPT), POC 19      AST (SGOT), POC 26      POC BUN 10      Calcium, POC 10.3      POC Chloride 98      POC Creatinine 0.9      POC Glucose 147 (*)     POC Potassium 4.2      POC  Sodium 142      Bilirubin, POC 0.9      POC TCO2 27      Protein, POC 7.0     ISTAT PROCEDURE - Abnormal    POC PH 7.366      POC PCO2 41.0      POC PO2 33 (*)     POC HCO3 23.5 (*)     POC BE -2      POC SATURATED O2 61      POC Lactate 4.59 (*)     POC TCO2 25      Sample VENOUS      Site Other      Allens Test N/A     ISTAT PROCEDURE - Abnormal    POC PH 7.348 (*)     POC PCO2 43.4      POC PO2 25 (*)     POC HCO3 23.8 (*)     POC BE -2      POC SATURATED O2 43      POC Lactate 5.24 (*)     POC TCO2 25      Sample VENOUS      Site Other      Allens Test N/A     TROPONIN ISTAT    POC Cardiac Troponin I 0.01      Sample unknown     PROCALCITONIN    Procalcitonin 0.07     POCT CBC    Hematocrit        Hemoglobin        RBC        WBC        MCV        MCH, POC        MCHC        RDW-CV        Platelet Count, POC        MPV       POCT URINALYSIS W/O SCOPE   SARS-COV-2 RDRP GENE    POC Rapid COVID Negative       Acceptable Yes      Narrative:     This test utilizes isothermal nucleic acid amplification technology to detect the SARS-CoV-2 RdRp nucleic acid segment. The analytical sensitivity (limit of detection) is 500 copies/swab.     A POSITIVE result is indicative of the presence of SARS-CoV-2 RNA; clinical correlation with patient history and other diagnostic information is necessary to determine patient infection status.    A NEGATIVE result means that SARS-CoV-2 nucleic acids are not present above the limit of detection. A NEGATIVE result should be treated as presumptive. It does not rule out the possibility of COVID-19 and should not be the sole basis for treatment decisions. If COVID-19 is strongly suspected based on clinical and exposure history, re-testing using an alternate molecular assay should be considered.     Commercial kits are provided by [employer].        POCT INFLUENZA A/B MOLECULAR   POCT STREP A MOLECULAR   POCT CMP   POCT TROPONIN   POCT B-TYPE NATRIURETIC PEPTIDE (BNP)    POCT D DIMER   POCT D DIMER    POC D-     POCT B-TYPE NATRIURETIC PEPTIDE (BNP)    POC B-Type Natriuretic Peptide 99.5     POCT STREP A, RAPID    POC Rapid Strep A negative     POCT RAPID INFLUENZA A/B    Influenza B Ag negative      Inflenza A Ag negative          ECG Results              EKG 12-lead (Final result)        Collection Time Result Time QRS Duration OHS QTC Calculation    11/05/24 13:13:58 11/05/24 19:38:15 80 443                     Final result by Interface, Lab In [facility] (11/05/24 19:38:24)                   Narrative:    Test Reason : R07.9,    Vent. Rate : 107 BPM     Atrial Rate : 107 BPM     P-R Int : 190 ms          QRS Dur : 080 ms      QT Int : 332 ms       P-R-T Axes : 034 021 086 degrees     QTc Int : 443 ms    Sinus tachycardia  Septal infarct (cited on or before 17-OCT-2024)  Abnormal ECG  When compared with ECG of 17-OCT-2024 08:32,  Borderline criteria for Lateral infarct are no longer Present  Nonspecific T wave abnormality no longer evident in Anterior leads  Confirmed by Brandon Ventura MD ([id number]) on 11/5/2024 7:38:10 PM    Referred By:             Confirmed By:Brandon Ventura MD                                  Imaging Results              X-Ray Chest AP Portable (Final result)  Result time 11/05/24 15:17:57      Final result by Loki Freeman MD (11/05/24 15:17:57)                   Impression:      Increase in diffuse bilateral airspace opacities.      Electronically signed by: Loki Freeman MD  Date:    11/05/2024  Time:    15:17               Narrative:    EXAMINATION:  XR CHEST AP PORTABLE    CLINICAL HISTORY:  Shortness of breath    TECHNIQUE:  Single frontal view of the chest was performed.    COMPARISON:  10/17/2024.    FINDINGS:  Monitoring EKG leads are present.  The trachea is unremarkable.  The cardiomediastinal silhouette is stable.  There is no evidence of free air beneath the hemidiaphragms.  There are no pleural effusions.  There is no evidence of a  pneumothorax.  There is no evidence of pneumomediastinum.  There is increase in the appearance of diffuse bilateral airspace opacities.  There are degenerative changes in the osseous structures.                                       Medications   enoxaparin injection 40 mg (40 mg Subcutaneous Given 11/6/24 1700)   budesonide nebulizer solution 1 mg (1 mg Nebulization Given 11/6/24 2020)   arformoteroL nebulizer solution 15 mcg (15 mcg Nebulization Given 11/6/24 2019)   levoFLOXacin 750 mg/150 mL IVPB 750 mg (0 mg Intravenous Stopped 11/6/24 1837)   hydrALAZINE injection 5 mg (has no administration in time range)   furosemide injection 40 mg (40 mg Intravenous Given 11/6/24 1016)   levalbuterol nebulizer solution 1.25 mg (has no administration in time range)   amLODIPine tablet 10 mg (10 mg Oral Given 11/6/24 0935)   aspirin chewable tablet 81 mg (81 mg Oral Given 11/6/24 0935)   atorvastatin tablet 40 mg (40 mg Oral Given 11/6/24 2205)   isosorbide mononitrate 24 hr tablet 30 mg (30 mg Oral Given 11/6/24 0935)   metoprolol succinate (TOPROL-XL) 24 hr tablet 50 mg (50 mg Oral Given 11/6/24 0936)   sacubitriL-valsartan 24-26 mg per tablet 1 tablet (1 tablet Oral Given 11/6/24 2204)   sucralfate tablet 1 g (1 g Oral Given 11/7/24 0550)   tamsulosin 24 hr capsule 0.4 mg (0.4 mg Oral Given 11/6/24 0936)   famotidine tablet 40 mg (40 mg Oral Given 11/6/24 0935)   nitroGLYCERIN SL tablet 0.4 mg (has no administration in time range)   0.9%  NaCl infusion ( Intravenous New Bag 11/6/24 0015)   guaiFENesin 100 mg/5 ml syrup 400 mg (400 mg Oral Given 11/6/24 0428)   benzonatate capsule 100 mg (has no administration in time range)   levalbuterol nebulizer solution 0.63 mg (0.63 mg Nebulization Given 11/6/24 2020)   glucose chewable tablet 16 g (has no administration in time range)   glucose chewable tablet 24 g (has no administration in time range)   glucagon (human recombinant) injection 1 mg (has no administration in time  range)   insulin aspart U-100 pen 0-10 Units (5 Units Subcutaneous Given 11/6/24 2207)   methylPREDNISolone sodium succinate injection 80 mg (80 mg Intravenous Given 11/6/24 1702)   mycophenolate capsule 1,000 mg (1,000 mg Oral Given 11/6/24 2204)   diphenhydrAMINE capsule 25 mg (25 mg Oral Given 11/6/24 2204)   methylPREDNISolone sodium succinate injection 125 mg (125 mg Intravenous Given 11/5/24 1343)   albuterol-ipratropium 2.5 mg-0.5 mg/3 mL nebulizer solution 3 mL (3 mLs Nebulization Given 11/5/24 1333)   albuterol-ipratropium 2.5 mg-0.5 mg/3 mL nebulizer solution 3 mL (3 mLs Nebulization Given 11/5/24 1531)   furosemide injection 40 mg (40 mg Intravenous Given 11/5/24 1645)   albuterol-ipratropium 2.5 mg-0.5 mg/3 mL nebulizer solution 3 mL (3 mLs Nebulization Given 11/5/24 1631)   levoFLOXacin 750 mg/150 mL IVPB 750 mg (750 mg Intravenous New Bag 11/5/24 1756)   sodium chloride 0.9% bolus 1,000 mL 1,000 mL (1,000 mLs Intravenous New Bag 11/5/24 1820)     Medical Decision Making  Amount and/or Complexity of Data Reviewed  Labs: ordered. Decision-making details documented in ED Course.  Radiology: ordered. Decision-making details documented in ED Course.  ECG/medicine tests: ordered and independent interpretation performed. Decision-making details documented in ED Course.    Risk  Prescription drug management.    Medical Decision Making:    This is an evaluation of a 73 y.o. male that presents to the Emergency Department for   Chief Complaint   Patient presents with    Shortness of Breath     Pt reports a cough this am,  Pt reports SOB since walking in   Pt huffing and puffing in triage  Pt unable to complete a sentence without having to gasping for air   Pt supposed to be on 2 liters nasal cannula but did not bring it with him      The patient is a non-toxic and well appearing patient. On physical exam, patient appears well hydrated with moist mucus membranes. Regular rhythm. No murmurs. Abdomen soft and non  tender. Patient is tolerating PO without difficulty. Physical exam otherwise as above.     I have reviewed vital signs and nursing notes.   Vital Signs Are Reassuring.     Based on the patient's symptoms, I am considering and evaluating for the following differential diagnoses: hypoxia, respiratory distress, pneumothorax, pneumonia, CHF exacerbation, STEMI, NSTEMI, cardiac arrhyhtmia.     Consider hospitalization for:  Shortness of breath    Patient is agreeable to transfer and admission to [facility] if necessary    ED Course:Treatment in the ED included Physical Exam and medications given in ED  Medications   enoxaparin injection 40 mg (40 mg Subcutaneous Given 11/6/24 1700)   budesonide nebulizer solution 1 mg (1 mg Nebulization Given 11/6/24 2020)   arformoteroL nebulizer solution 15 mcg (15 mcg Nebulization Given 11/6/24 2019)   levoFLOXacin 750 mg/150 mL IVPB 750 mg (0 mg Intravenous Stopped 11/6/24 1837)   hydrALAZINE injection 5 mg (has no administration in time range)   furosemide injection 40 mg (40 mg Intravenous Given 11/6/24 1016)   levalbuterol nebulizer solution 1.25 mg (has no administration in time range)   amLODIPine tablet 10 mg (10 mg Oral Given 11/6/24 0935)   aspirin chewable tablet 81 mg (81 mg Oral Given 11/6/24 0935)   atorvastatin tablet 40 mg (40 mg Oral Given 11/6/24 2205)   isosorbide mononitrate 24 hr tablet 30 mg (30 mg Oral Given 11/6/24 0935)   metoprolol succinate (TOPROL-XL) 24 hr tablet 50 mg (50 mg Oral Given 11/6/24 0936)   sacubitriL-valsartan 24-26 mg per tablet 1 tablet (1 tablet Oral Given 11/6/24 2204)   sucralfate tablet 1 g (1 g Oral Given 11/7/24 0550)   tamsulosin 24 hr capsule 0.4 mg (0.4 mg Oral Given 11/6/24 0936)   famotidine tablet 40 mg (40 mg Oral Given 11/6/24 0935)   nitroGLYCERIN SL tablet 0.4 mg (has no administration in time range)   0.9%  NaCl infusion ( Intravenous New Bag 11/6/24 0015)   guaiFENesin 100 mg/5 ml syrup 400 mg (400 mg Oral  Given 11/6/24 0428)   benzonatate capsule 100 mg (has no administration in time range)   levalbuterol nebulizer solution 0.63 mg (0.63 mg Nebulization Given 11/6/24 2020)   glucose chewable tablet 16 g (has no administration in time range)   glucose chewable tablet 24 g (has no administration in time range)   glucagon (human recombinant) injection 1 mg (has no administration in time range)   insulin aspart U-100 pen 0-10 Units (5 Units Subcutaneous Given 11/6/24 2207)   methylPREDNISolone sodium succinate injection 80 mg (80 mg Intravenous Given 11/6/24 1702)   mycophenolate capsule 1,000 mg (1,000 mg Oral Given 11/6/24 2204)   diphenhydrAMINE capsule 25 mg (25 mg Oral Given 11/6/24 2204)   methylPREDNISolone sodium succinate injection 125 mg (125 mg Intravenous Given 11/5/24 1343)   albuterol-ipratropium 2.5 mg-0.5 mg/3 mL nebulizer solution 3 mL (3 mLs Nebulization Given 11/5/24 1333)   albuterol-ipratropium 2.5 mg-0.5 mg/3 mL nebulizer solution 3 mL (3 mLs Nebulization Given 11/5/24 1531)   furosemide injection 40 mg (40 mg Intravenous Given 11/5/24 1645)   albuterol-ipratropium 2.5 mg-0.5 mg/3 mL nebulizer solution 3 mL (3 mLs Nebulization Given 11/5/24 1631)   levoFLOXacin 750 mg/150 mL IVPB 750 mg (750 mg Intravenous New Bag 11/5/24 1756)   sodium chloride 0.9% bolus 1,000 mL 1,000 mL (1,000 mLs Intravenous New Bag 11/5/24 1820)     Patient reports feeling better after treatment in the ER.       External Data/Documents Reviewed: Previous medical records and vital signs reviewed, see HPI and Physical exam.   Labs: ordered and reviewed.  BNP 99.5.  Lactic acid is elevated.  Patient given 1 L normal saline.  Limited IV fluid administered secondary to national shortage.  Radiology: ordered as indicated and reviewed.  Diffuse bilateral opacities  ECG/medicine tests: ordered and independent interpretation performed by Dr. Ivette Reid DO. Decision-making details documented in ED Course.   Cardiac monitor placed for  shortness of breath. Monitor shows Sinus Tachycardia with  rate of 105. Interpreted by Dr. Ivette Reid DO.    Risk  Diagnosis or treatment significantly limited by the following social determinants of health: Body mass index is 22.81 kg/m².     In shared decision making with the patient, we discussed treatment, prescriptions, labs, and imaging results.        I contacted  at time 4:55 p.m., requesting consult with Hospital medicine  for admission. Requesting consultation with Pulmonary and internal medicine for services not available at this facility.   Discussed patient's presentation, past medical history, physical exam, labs, radiology results, vital signs, and ED course.      Patient accepted by [facility]iasse on call for Dr Han for transfer and admission at time 5:06pm   At this time patient will be transferred & admitted.  Patient will be transferred via EMS to accepting facility.      At time of admission patient is awake alert oriented x4 speaking clearly in full sentences and moving all 4 extremities.     The following labs and imaging were reviewed:        Admission on 11/05/2024   Component Date Value Ref Range Status    QRS Duration 11/05/2024 80  ms Final    OHS QTC Calculation 11/05/2024 443  ms Final    POCT Glucose 11/05/2024 157 (H)  70 - 110 mg/dL Final    Albumin, POC 11/05/2024 3.5  3.3 - 5.5 g/dL Final    Alkaline Phosphatase, POC 11/05/2024 54  42 - 141 U/L Final    ALT (SGPT), POC 11/05/2024 19  10 - 47 U/L Final    AST (SGOT), POC 11/05/2024 26  11 - 38 U/L Final    POC BUN 11/05/2024 10  7 - 22 mg/dL Final    Calcium, POC 11/05/2024 10.3  8.0 - 10.3 mg/dL Final    POC Chloride 11/05/2024 98  98 - 108 mmol/L Final    POC Creatinine 11/05/2024 0.9  0.6 - 1.2 mg/dL Final    POC Glucose 11/05/2024 147 (H)  73 - 118 mg/dL Final    POC Potassium 11/05/2024 4.2  3.6 - 5.1 mmol/L Final    POC Sodium 11/05/2024 142  128 - 145 mmol/L Final    Bilirubin, POC 11/05/2024 0.9  0.2 - 1.6 mg/dL Final     POC TCO2 11/05/2024 27  18 - 33 mmol/L Final    Protein, POC 11/05/2024 7.0  6.4 - 8.1 g/dL Final    POC Cardiac Troponin I 11/05/2024 0.01  0.00 - 0.08 ng/mL Final    Sample 11/05/2024 unknown   Final    Comment: A single negative troponin is insufficient to rule out myocardial infarction.  The use of a serial sampling protocol is recommended practice. Correlate results with reference intervals established for methodology used. Point of care and core laboratory   troponin results are not interchangeable.      POC D-DI 11/05/2024 148  0.0 - 450.0 ng/mL Final    POC B-Type Natriuretic Peptide 11/05/2024 99.5  0.0 - 100.0 pg/mL Final    Glucose, UA 11/05/2024 Trace (A)  Negative Final    Bilirubin, UA 11/05/2024 Negative  Negative Final    Ketones, UA 11/05/2024 Negative  Negative Final    Spec Grav UA 11/05/2024 1.015  1.005 - 1.030 Final    Blood, UA 11/05/2024 Trace-lysed (A)  Negative Final    PH, UA 11/05/2024 7.0  5.0 - 8.0 Final    Protein, UA 11/05/2024 2+ (A)  Negative Final    Urobilinogen, UA 11/05/2024 0.2  <=1.0 E.U./dL Final    Nitrite, UA 11/05/2024 Negative  Negative Final    Leukocytes, UA 11/05/2024 Negative  Negative Final    Color, UA POC 11/05/2024 Yellow  Yellow, Straw, Linda Final    Clarity, UA, POC 11/05/2024 Clear  Clear Final    Procalcitonin 11/05/2024 0.07  <0.25 ng/mL Final    Comment: A concentration < 0.25 ng/mL represents a low risk of bacterial   infection.  Procalcitonin may not be accurate among patients with localized   infection, recent trauma or major surgery, immunosuppressed state,   invasive fungal infection, renal dysfunction. Decisions regarding   initiation or continuation of antibiotic therapy should not be based   solely on procalcitonin levels.      Blood Culture, Routine 11/05/2024 No Growth to date   Preliminary    Blood Culture, Routine 11/05/2024 No Growth to date   Preliminary    Blood Culture, Routine 11/05/2024 No Growth to date   Preliminary    Blood Culture,  Routine 11/05/2024 No Growth to date   Preliminary    POC PH 11/05/2024 7.366  7.35 - 7.45 Final    POC PCO2 11/05/2024 41.0  35 - 45 mmHg Final    POC PO2 11/05/2024 33 (LL)  40 - 60 mmHg Final    POC HCO3 11/05/2024 23.5 (L)  24 - 28 mmol/L Final    POC BE 11/05/2024 -2  -2 to 2 mmol/L Final    POC SATURATED O2 11/05/2024 61  95 - 100 % Final    POC Lactate 11/05/2024 4.59 (HH)  0.5 - 2.2 mmol/L Final    POC TCO2 11/05/2024 25  24 - 29 mmol/L Final    Sample 11/05/2024 VENOUS   Final    Site 11/05/2024 Other   Final    Allens Test 11/05/2024 N/A   Final    POC Rapid COVID 11/05/2024 Negative  Negative Final     Acceptable 11/05/2024 Yes   Final    POC Rapid Strep A 11/05/2024 negative  Positive/Negative Final    Influenza B Ag 11/05/2024 negative  Positive/Negative Final    Inflenza A Ag 11/05/2024 negative  Positive/Negative Final    POC PH 11/05/2024 7.348 (L)  7.35 - 7.45 Final    POC PCO2 11/05/2024 43.4  35 - 45 mmHg Final    POC PO2 11/05/2024 25 (LL)  40 - 60 mmHg Final    POC HCO3 11/05/2024 23.8 (L)  24 - 28 mmol/L Final    POC BE 11/05/2024 -2  -2 to 2 mmol/L Final    POC SATURATED O2 11/05/2024 43  95 - 100 % Final    POC Lactate 11/05/2024 5.24 (HH)  0.5 - 2.2 mmol/L Final    POC TCO2 11/05/2024 25  24 - 29 mmol/L Final    Sample 11/05/2024 VENOUS   Final    Site 11/05/2024 Other   Final    Allens Test 11/05/2024 N/A   Final    Lactate (Lactic Acid) 11/05/2024 5.5 (HH)  0.5 - 2.2 mmol/L Final    Comment: Falsely low lactic acid results can be found in samples   containing >=13.0 mg/dL total bilirubin and/or >=3.5 mg/dL   direct bilirubin.  LACTIC ACID critical result(s) called and verbal readback obtained   from NIKA MESSER by HEATHER 11/05/2024 23:04      Procalcitonin 11/05/2024 0.07  <0.25 ng/mL Final    Comment: A concentration < 0.25 ng/mL represents a low risk of bacterial   infection.  Procalcitonin may not be accurate among patients with localized   infection, recent trauma or  major surgery, immunosuppressed state,   invasive fungal infection, renal dysfunction. Decisions regarding   initiation or continuation of antibiotic therapy should not be based   solely on procalcitonin levels.      WBC 11/05/2024 5.51  3.90 - 12.70 K/uL Final    RBC 11/05/2024 4.82  4.60 - 6.20 M/uL Final    Hemoglobin 11/05/2024 13.8 (L)  14.0 - 18.0 g/dL Final    Hematocrit 11/05/2024 40.7  40.0 - 54.0 % Final    MCV 11/05/2024 84  82 - 98 fL Final    MCH 11/05/2024 28.6  27.0 - 31.0 pg Final    MCHC 11/05/2024 33.9  32.0 - 36.0 g/dL Final    RDW 11/05/2024 13.9  11.5 - 14.5 % Final    Platelets 11/05/2024 180  150 - 450 K/uL Final    MPV 11/05/2024 9.7  9.2 - 12.9 fL Final    Immature Granulocytes 11/05/2024 0.4  0.0 - 0.5 % Final    Gran # (ANC) 11/05/2024 4.8  1.8 - 7.7 K/uL Final    Immature Grans (Abs) 11/05/2024 0.02  0.00 - 0.04 K/uL Final    Comment: Mild elevation in immature granulocytes is non specific and   can be seen in a variety of conditions including stress response,   acute inflammation, trauma and pregnancy. Correlation with other   laboratory and clinical findings is essential.      Lymph # 11/05/2024 0.7 (L)  1.0 - 4.8 K/uL Final    Mono # 11/05/2024 0.0 (L)  0.3 - 1.0 K/uL Final    Eos # 11/05/2024 0.0  0.0 - 0.5 K/uL Final    Baso # 11/05/2024 0.00  0.00 - 0.20 K/uL Final    nRBC 11/05/2024 0  0 /100 WBC Final    Gran % 11/05/2024 86.7 (H)  38.0 - 73.0 % Final    Lymph % 11/05/2024 12.2 (L)  18.0 - 48.0 % Final    Mono % 11/05/2024 0.7 (L)  4.0 - 15.0 % Final    Eosinophil % 11/05/2024 0.0  0.0 - 8.0 % Final    Basophil % 11/05/2024 0.0  0.0 - 1.9 % Final    Differential Method 11/05/2024 Automated   Final    Prothrombin Time 11/05/2024 12.5  9.0 - 12.5 sec Final    INR 11/05/2024 1.2  0.8 - 1.2 Final    Comment: Coumadin Therapy:  2.0 - 3.0 for INR for all indicators except mechanical heart valves  and antiphospholipid syndromes which should use 2.5 - 3.5.      QRS Duration 11/05/2024 92   ms Final    [facility] QTC Calculation 11/05/2024 471  ms Final    Troponin I 11/05/2024 <0.006  0.000 - 0.026 ng/mL Final    Comment: The reference interval for Troponin I represents the 99th percentile   cutoff   for our facility and is consistent with 3rd generation assay   performance.      Respiratory Infection Panel Source 11/06/2024 NP swab   Final    Adenovirus 11/06/2024 Not Detected  Not Detected Final    Coronavirus 229E, Common Cold Virus 11/06/2024 Not Detected  Not Detected Final    Coronavirus HKU1, Common Cold Virus 11/06/2024 Not Detected  Not Detected Final    Coronavirus NL63, Common Cold Virus 11/06/2024 Not Detected  Not Detected Final    Coronavirus OC43, Common Cold Virus 11/06/2024 Not Detected  Not Detected Final    Comment: The Coronavirus strains detected in this test cause the common cold.  These strains are not the COVID-19 (novel Coronavirus)strain   associated with the respiratory disease outbreak.      SARS-CoV2 (COVID-19) Qualitative P* 11/06/2024 Not Detected  Not Detected Final    Human Metapneumovirus 11/06/2024 Not Detected  Not Detected Final    Human Rhinovirus/Enterovirus 11/06/2024 Not Detected  Not Detected Final    Influenza A (subtypes H1, H1-2009,* 11/06/2024 Not Detected  Not Detected Final    Influenza B 11/06/2024 Not Detected  Not Detected Final    Parainfluenza Virus 1 11/06/2024 Not Detected  Not Detected Final    Parainfluenza Virus 2 11/06/2024 Not Detected  Not Detected Final    Parainfluenza Virus 3 11/06/2024 Not Detected  Not Detected Final    Parainfluenza Virus 4 11/06/2024 Not Detected  Not Detected Final    Respiratory Syncytial Virus 11/06/2024 Not Detected  Not Detected Final    Bordetella Parapertussis ([id number]) 11/06/2024 Not Detected  Not Detected Final    Bordetella pertussis (ptxP) 11/06/2024 Not Detected  Not Detected Final    Chlamydia pneumoniae 11/06/2024 Not Detected  Not Detected Final    Mycoplasma pneumoniae 11/06/2024 Not Detected  Not Detected  Final    Lactate (Lactic Acid) 11/06/2024 4.5 (HH)  0.5 - 2.2 mmol/L Final    Comment: Falsely low lactic acid results can be found in samples   containing >=13.0 mg/dL total bilirubin and/or >=3.5 mg/dL   direct bilirubin.  LACTIC ACID critical result(s) called and verbal readback obtained   from NIKA MESSER by HEATHER 11/06/2024 02:37      Procalcitonin 11/06/2024 0.06  <0.25 ng/mL Final    Comment: A concentration < 0.25 ng/mL represents a low risk of bacterial   infection.  Procalcitonin may not be accurate among patients with localized   infection, recent trauma or major surgery, immunosuppressed state,   invasive fungal infection, renal dysfunction. Decisions regarding   initiation or continuation of antibiotic therapy should not be based   solely on procalcitonin levels.      Sodium 11/06/2024 141  136 - 145 mmol/L Final    Potassium 11/06/2024 3.6  3.5 - 5.1 mmol/L Final    Chloride 11/06/2024 105  95 - 110 mmol/L Final    CO2 11/06/2024 23  23 - 29 mmol/L Final    Glucose 11/06/2024 269 (H)  70 - 110 mg/dL Final    BUN 11/06/2024 15  8 - 23 mg/dL Final    Creatinine 11/06/2024 0.9  0.5 - 1.4 mg/dL Final    Calcium 11/06/2024 9.5  8.7 - 10.5 mg/dL Final    Anion Gap 11/06/2024 13  8 - 16 mmol/L Final    eGFR 11/06/2024 >60  >60 mL/min/1.73 m^2 Final    WBC 11/06/2024 9.38  3.90 - 12.70 K/uL Final    RBC 11/06/2024 4.91  4.60 - 6.20 M/uL Final    Hemoglobin 11/06/2024 14.1  14.0 - 18.0 g/dL Final    Hematocrit 11/06/2024 40.8  40.0 - 54.0 % Final    MCV 11/06/2024 83  82 - 98 fL Final    MCH 11/06/2024 28.7  27.0 - 31.0 pg Final    MCHC 11/06/2024 34.6  32.0 - 36.0 g/dL Final    RDW 11/06/2024 13.9  11.5 - 14.5 % Final    Platelets 11/06/2024 198  150 - 450 K/uL Final    MPV 11/06/2024 10.0  9.2 - 12.9 fL Final    Immature Granulocytes 11/06/2024 0.2  0.0 - 0.5 % Final    Gran # (ANC) 11/06/2024 8.2 (H)  1.8 - 7.7 K/uL Final    Immature Grans (Abs) 11/06/2024 0.02  0.00 - 0.04 K/uL Final    Comment: Mild  elevation in immature granulocytes is non specific and   can be seen in a variety of conditions including stress response,   acute inflammation, trauma and pregnancy. Correlation with other   laboratory and clinical findings is essential.      Lymph # 11/06/2024 0.9 (L)  1.0 - 4.8 K/uL Final    Mono # 11/06/2024 0.3  0.3 - 1.0 K/uL Final    Eos # 11/06/2024 0.0  0.0 - 0.5 K/uL Final    Baso # 11/06/2024 0.00  0.00 - 0.20 K/uL Final    nRBC 11/06/2024 0  0 /100 WBC Final    Gran % 11/06/2024 87.6 (H)  38.0 - 73.0 % Final    Lymph % 11/06/2024 9.3 (L)  18.0 - 48.0 % Final    Mono % 11/06/2024 2.9 (L)  4.0 - 15.0 % Final    Eosinophil % 11/06/2024 0.0  0.0 - 8.0 % Final    Basophil % 11/06/2024 0.0  0.0 - 1.9 % Final    Differential Method 11/06/2024 Automated   Final    Lactate (Lactic Acid) 11/06/2024 2.5 (H)  0.5 - 2.2 mmol/L Final    Comment: Falsely low lactic acid results can be found in samples   containing >=13.0 mg/dL total bilirubin and/or >=3.5 mg/dL   direct bilirubin.      Magnesium 11/06/2024 1.7  1.6 - 2.6 mg/dL Final    Phosphorus 11/06/2024 2.8  2.7 - 4.5 mg/dL Final    Troponin I 11/06/2024 <0.006  0.000 - 0.026 ng/mL Final    Comment: The reference interval for Troponin I represents the 99th percentile   cutoff   for our facility and is consistent with 3rd generation assay   performance.      Troponin I 11/06/2024 <0.006  0.000 - 0.026 ng/mL Final    Comment: The reference interval for Troponin I represents the 99th percentile   cutoff   for our facility and is consistent with 3rd generation assay   performance.      POCT Glucose 11/06/2024 354 (H)  70 - 110 mg/dL Final    POCT Glucose 11/06/2024 363 (H)  70 - 110 mg/dL Final    POCT Glucose 11/06/2024 444 (H)  70 - 110 mg/dL Final    POCT Glucose 11/06/2024 374 (H)  70 - 110 mg/dL Final        Imaging Results              X-Ray Chest AP Portable (Final result)  Result time 11/05/24 15:17:57      Final result by Loki Freeman MD (11/05/24 15:17:57)                    Impression:      Increase in diffuse bilateral airspace opacities.      Electronically signed by: Loki Freeman MD  Date:    11/05/2024  Time:    15:17               Narrative:    EXAMINATION:  XR CHEST AP PORTABLE    CLINICAL HISTORY:  Shortness of breath    TECHNIQUE:  Single frontal view of the chest was performed.    COMPARISON:  10/17/2024.    FINDINGS:  Monitoring EKG leads are present.  The trachea is unremarkable.  The cardiomediastinal silhouette is stable.  There is no evidence of free air beneath the hemidiaphragms.  There are no pleural effusions.  There is no evidence of a pneumothorax.  There is no evidence of pneumomediastinum.  There is increase in the appearance of diffuse bilateral airspace opacities.  There are degenerative changes in the osseous structures.                                            Scribe Attestation:   Scribe #1: I performed the above scribed service and the documentation accurately describes the services I performed. I attest to the accuracy of the note.        ED Course as of 11/07/24 0651   Tue Nov 05, 2024   1511 EKG reviewed and interpreted by Dr. Reid.  No STEMI.  Sinus tachycardia, ventricular rate of 107.  Normal axis.  Abnormal EKG.  QTC within normal limits at 443.  When compared to previous EKG done on 10/17/2024 but has increased by 18 beats per minute today [RF]   1651 Consulted pulmonology Dr. Pérez.  Specialist recommends admit to Hospital [facility].  Prednisone 40 mg daily.  Lasix 40 mg IV q.day and DuoNebs Q 4 moving forward. [RF]      ED Course User Index  [RF] Ivette Reid DO                            I, Dr. Ivette Reid, personally performed the services described in this documentation. This document was produced by a scribe under my direction and in my presence. All medical record entries made by the scribe were at my direction and in my presence.  I have reviewed the chart and agree that the record reflects my personal performance  and is accurate and complete. Ivette Reid DO.     11/07/2024 5:06 PM    Clinical Impression:  Final diagnoses:  [R07.9] Chest pain  [R06.02] SOB (shortness of breath)  [J96.90] Respiratory failure  [J84.9] Interstitial pulmonary disease (Primary)          ED Disposition Condition    Observation Stable                Ivette Reid DO  11/07/24 0651

## 2024-11-05 NOTE — TELEPHONE ENCOUNTER
----- Message from Cezar sent at 11/5/2024 12:58 PM CST -----  Regarding: Naomi With [facility]                                                                      Provider Information        Provider: Naomi Rai         Message: stated that they need to verify the chronic condition of cardio vascular disease andc hronic heart failure. They just need a verbal yes or no for the patient to keep the plan that he has with the insurance. Please reach out to them. The reference number is [id number].      Contact Info:[phone]       Additional Info:

## 2024-11-05 NOTE — TELEPHONE ENCOUNTER
Returned call to [facility] spoke with Christopher and verified dx of Heart failure, cardiovascular disease, and  DM.

## 2024-11-05 NOTE — Clinical Note
Diagnosis: Respiratory failure [792069]   Future Attending Provider: CHELY JONES [[id number]]   Special Needs:: No Special Needs [1]

## 2024-11-06 PROBLEM — T38.0X5A STEROID-INDUCED HYPERGLYCEMIA: Status: RESOLVED | Noted: 2024-08-07 | Resolved: 2024-11-06

## 2024-11-06 PROBLEM — E16.2 HYPOGLYCEMIA: Status: RESOLVED | Noted: 2024-04-20 | Resolved: 2024-11-06

## 2024-11-06 PROBLEM — J96.21 ACUTE ON CHRONIC HYPOXIC RESPIRATORY FAILURE: Status: ACTIVE | Noted: 2024-09-23

## 2024-11-06 PROBLEM — R73.9 STEROID-INDUCED HYPERGLYCEMIA: Status: RESOLVED | Noted: 2024-08-07 | Resolved: 2024-11-06

## 2024-11-06 PROBLEM — R06.02 SOB (SHORTNESS OF BREATH): Status: RESOLVED | Noted: 2024-05-22 | Resolved: 2024-11-06

## 2024-11-06 PROBLEM — E83.52 HYPERCALCEMIA: Status: RESOLVED | Noted: 2024-04-20 | Resolved: 2024-11-06

## 2024-11-06 PROBLEM — T88.7XXA SIDE EFFECT OF MEDICATION: Status: RESOLVED | Noted: 2024-03-13 | Resolved: 2024-11-06

## 2024-11-06 PROBLEM — I20.0 UNSTABLE ANGINA: Status: RESOLVED | Noted: 2024-09-10 | Resolved: 2024-11-06

## 2024-11-06 PROBLEM — E11.65 TYPE 2 DIABETES MELLITUS WITH HYPERGLYCEMIA: Status: RESOLVED | Noted: 2024-02-28 | Resolved: 2024-11-06

## 2024-11-06 PROBLEM — R07.9 CHEST PAIN: Status: RESOLVED | Noted: 2024-10-17 | Resolved: 2024-11-06

## 2024-11-06 LAB
ADENOVIRUS: NOT DETECTED
ANION GAP SERPL CALC-SCNC: 13 MMOL/L (ref 8–16)
BASOPHILS # BLD AUTO: 0 K/UL (ref 0–0.2)
BASOPHILS NFR BLD: 0 % (ref 0–1.9)
BORDETELLA PARAPERTUSSIS (IS1001): NOT DETECTED
BORDETELLA PERTUSSIS (PTXP): NOT DETECTED
BUN SERPL-MCNC: 15 MG/DL (ref 8–23)
CALCIUM SERPL-MCNC: 9.5 MG/DL (ref 8.7–10.5)
CHLAMYDIA PNEUMONIAE: NOT DETECTED
CHLORIDE SERPL-SCNC: 105 MMOL/L (ref 95–110)
CO2 SERPL-SCNC: 23 MMOL/L (ref 23–29)
CORONAVIRUS 229E, COMMON COLD VIRUS: NOT DETECTED
CORONAVIRUS HKU1, COMMON COLD VIRUS: NOT DETECTED
CORONAVIRUS NL63, COMMON COLD VIRUS: NOT DETECTED
CORONAVIRUS OC43, COMMON COLD VIRUS: NOT DETECTED
CREAT SERPL-MCNC: 0.9 MG/DL (ref 0.5–1.4)
DIFFERENTIAL METHOD BLD: ABNORMAL
EOSINOPHIL # BLD AUTO: 0 K/UL (ref 0–0.5)
EOSINOPHIL NFR BLD: 0 % (ref 0–8)
ERYTHROCYTE [DISTWIDTH] IN BLOOD BY AUTOMATED COUNT: 13.9 % (ref 11.5–14.5)
EST. GFR  (NO RACE VARIABLE): >60 ML/MIN/1.73 M^2
FLUBV RNA NPH QL NAA+NON-PROBE: NOT DETECTED
GLUCOSE SERPL-MCNC: 269 MG/DL (ref 70–110)
HCT VFR BLD AUTO: 40.8 % (ref 40–54)
HGB BLD-MCNC: 14.1 G/DL (ref 14–18)
HPIV1 RNA NPH QL NAA+NON-PROBE: NOT DETECTED
HPIV2 RNA NPH QL NAA+NON-PROBE: NOT DETECTED
HPIV3 RNA NPH QL NAA+NON-PROBE: NOT DETECTED
HPIV4 RNA NPH QL NAA+NON-PROBE: NOT DETECTED
HUMAN METAPNEUMOVIRUS: NOT DETECTED
IMM GRANULOCYTES # BLD AUTO: 0.02 K/UL (ref 0–0.04)
IMM GRANULOCYTES NFR BLD AUTO: 0.2 % (ref 0–0.5)
INFLUENZA A (SUBTYPES H1,H1-2009,H3): NOT DETECTED
LACTATE SERPL-SCNC: 2.5 MMOL/L (ref 0.5–2.2)
LACTATE SERPL-SCNC: 4.5 MMOL/L (ref 0.5–2.2)
LYMPHOCYTES # BLD AUTO: 0.9 K/UL (ref 1–4.8)
LYMPHOCYTES NFR BLD: 9.3 % (ref 18–48)
MAGNESIUM SERPL-MCNC: 1.7 MG/DL (ref 1.6–2.6)
MCH RBC QN AUTO: 28.7 PG (ref 27–31)
MCHC RBC AUTO-ENTMCNC: 34.6 G/DL (ref 32–36)
MCV RBC AUTO: 83 FL (ref 82–98)
MONOCYTES # BLD AUTO: 0.3 K/UL (ref 0.3–1)
MONOCYTES NFR BLD: 2.9 % (ref 4–15)
MYCOPLASMA PNEUMONIAE: NOT DETECTED
NEUTROPHILS # BLD AUTO: 8.2 K/UL (ref 1.8–7.7)
NEUTROPHILS NFR BLD: 87.6 % (ref 38–73)
NRBC BLD-RTO: 0 /100 WBC
OHS QRS DURATION: 92 MS
OHS QTC CALCULATION: 471 MS
PHOSPHATE SERPL-MCNC: 2.8 MG/DL (ref 2.7–4.5)
PLATELET # BLD AUTO: 198 K/UL (ref 150–450)
PMV BLD AUTO: 10 FL (ref 9.2–12.9)
POCT GLUCOSE: 354 MG/DL (ref 70–110)
POCT GLUCOSE: 363 MG/DL (ref 70–110)
POCT GLUCOSE: 374 MG/DL (ref 70–110)
POCT GLUCOSE: 444 MG/DL (ref 70–110)
POTASSIUM SERPL-SCNC: 3.6 MMOL/L (ref 3.5–5.1)
PROCALCITONIN SERPL IA-MCNC: 0.06 NG/ML
RBC # BLD AUTO: 4.91 M/UL (ref 4.6–6.2)
RESPIRATORY INFECTION PANEL SOURCE: NORMAL
RSV RNA NPH QL NAA+NON-PROBE: NOT DETECTED
RV+EV RNA NPH QL NAA+NON-PROBE: NOT DETECTED
SARS-COV-2 RNA RESP QL NAA+PROBE: NOT DETECTED
SODIUM SERPL-SCNC: 141 MMOL/L (ref 136–145)
TROPONIN I SERPL DL<=0.01 NG/ML-MCNC: <0.006 NG/ML (ref 0–0.03)
WBC # BLD AUTO: 9.38 K/UL (ref 3.9–12.7)

## 2024-11-06 PROCEDURE — 63600175 PHARM REV CODE 636 W HCPCS: Mod: HCNC

## 2024-11-06 PROCEDURE — 94640 AIRWAY INHALATION TREATMENT: CPT | Mod: HCNC,XB

## 2024-11-06 PROCEDURE — 21400001 HC TELEMETRY ROOM: Mod: HCNC

## 2024-11-06 PROCEDURE — 25000003 PHARM REV CODE 250: Mod: HCNC | Performed by: INTERNAL MEDICINE

## 2024-11-06 PROCEDURE — 85025 COMPLETE CBC W/AUTO DIFF WBC: CPT | Mod: HCNC | Performed by: INTERNAL MEDICINE

## 2024-11-06 PROCEDURE — 83735 ASSAY OF MAGNESIUM: CPT | Mod: HCNC | Performed by: INTERNAL MEDICINE

## 2024-11-06 PROCEDURE — 94761 N-INVAS EAR/PLS OXIMETRY MLT: CPT | Mod: HCNC

## 2024-11-06 PROCEDURE — 25000242 PHARM REV CODE 250 ALT 637 W/ HCPCS: Mod: HCNC | Performed by: STUDENT IN AN ORGANIZED HEALTH CARE EDUCATION/TRAINING PROGRAM

## 2024-11-06 PROCEDURE — 25000242 PHARM REV CODE 250 ALT 637 W/ HCPCS: Mod: HCNC

## 2024-11-06 PROCEDURE — 63600175 PHARM REV CODE 636 W HCPCS: Mod: HCNC | Performed by: INTERNAL MEDICINE

## 2024-11-06 PROCEDURE — 94640 AIRWAY INHALATION TREATMENT: CPT | Mod: HCNC,ER

## 2024-11-06 PROCEDURE — 83605 ASSAY OF LACTIC ACID: CPT | Mod: HCNC | Performed by: INTERNAL MEDICINE

## 2024-11-06 PROCEDURE — 99900035 HC TECH TIME PER 15 MIN (STAT): Mod: HCNC

## 2024-11-06 PROCEDURE — 87798 DETECT AGENT NOS DNA AMP: CPT | Mod: 59,HCNC | Performed by: INTERNAL MEDICINE

## 2024-11-06 PROCEDURE — 25000242 PHARM REV CODE 250 ALT 637 W/ HCPCS: Mod: HCNC | Performed by: INTERNAL MEDICINE

## 2024-11-06 PROCEDURE — 27000221 HC OXYGEN, UP TO 24 HOURS: Mod: HCNC

## 2024-11-06 PROCEDURE — 84145 PROCALCITONIN (PCT): CPT | Mod: HCNC | Performed by: INTERNAL MEDICINE

## 2024-11-06 PROCEDURE — 36415 COLL VENOUS BLD VENIPUNCTURE: CPT | Mod: HCNC | Performed by: INTERNAL MEDICINE

## 2024-11-06 PROCEDURE — 80048 BASIC METABOLIC PNL TOTAL CA: CPT | Mod: HCNC | Performed by: INTERNAL MEDICINE

## 2024-11-06 PROCEDURE — 94799 UNLISTED PULMONARY SVC/PX: CPT | Mod: HCNC

## 2024-11-06 PROCEDURE — 84100 ASSAY OF PHOSPHORUS: CPT | Mod: HCNC | Performed by: INTERNAL MEDICINE

## 2024-11-06 PROCEDURE — 99223 1ST HOSP IP/OBS HIGH 75: CPT | Mod: HCNC,,, | Performed by: INTERNAL MEDICINE

## 2024-11-06 PROCEDURE — 84484 ASSAY OF TROPONIN QUANT: CPT | Mod: HCNC | Performed by: INTERNAL MEDICINE

## 2024-11-06 RX ORDER — DIPHENHYDRAMINE HCL 25 MG
25 CAPSULE ORAL NIGHTLY
Status: DISCONTINUED | OUTPATIENT
Start: 2024-11-06 | End: 2024-11-11 | Stop reason: HOSPADM

## 2024-11-06 RX ORDER — INSULIN ASPART 100 [IU]/ML
0-10 INJECTION, SOLUTION INTRAVENOUS; SUBCUTANEOUS
Status: DISCONTINUED | OUTPATIENT
Start: 2024-11-06 | End: 2024-11-11 | Stop reason: HOSPADM

## 2024-11-06 RX ORDER — GLUCAGON 1 MG
1 KIT INJECTION
Status: DISCONTINUED | OUTPATIENT
Start: 2024-11-06 | End: 2024-11-11 | Stop reason: HOSPADM

## 2024-11-06 RX ORDER — IBUPROFEN 200 MG
24 TABLET ORAL
Status: DISCONTINUED | OUTPATIENT
Start: 2024-11-06 | End: 2024-11-11 | Stop reason: HOSPADM

## 2024-11-06 RX ORDER — LEVALBUTEROL INHALATION SOLUTION 0.63 MG/3ML
0.63 SOLUTION RESPIRATORY (INHALATION)
Status: DISCONTINUED | OUTPATIENT
Start: 2024-11-06 | End: 2024-11-11 | Stop reason: HOSPADM

## 2024-11-06 RX ORDER — IBUPROFEN 200 MG
16 TABLET ORAL
Status: DISCONTINUED | OUTPATIENT
Start: 2024-11-06 | End: 2024-11-11 | Stop reason: HOSPADM

## 2024-11-06 RX ORDER — GUAIFENESIN 100 MG/5ML
400 SOLUTION ORAL EVERY 4 HOURS PRN
Status: DISCONTINUED | OUTPATIENT
Start: 2024-11-06 | End: 2024-11-11 | Stop reason: HOSPADM

## 2024-11-06 RX ORDER — BENZONATATE 100 MG/1
100 CAPSULE ORAL 3 TIMES DAILY PRN
Status: DISCONTINUED | OUTPATIENT
Start: 2024-11-06 | End: 2024-11-11 | Stop reason: HOSPADM

## 2024-11-06 RX ORDER — LEVALBUTEROL INHALATION SOLUTION 0.63 MG/3ML
0.63 SOLUTION RESPIRATORY (INHALATION) EVERY 6 HOURS
Status: DISCONTINUED | OUTPATIENT
Start: 2024-11-06 | End: 2024-11-06

## 2024-11-06 RX ORDER — SODIUM CHLORIDE 9 MG/ML
INJECTION, SOLUTION INTRAVENOUS CONTINUOUS
Status: ACTIVE | OUTPATIENT
Start: 2024-11-06 | End: 2024-11-06

## 2024-11-06 RX ORDER — MYCOPHENOLATE MOFETIL 250 MG/1
1000 CAPSULE ORAL 2 TIMES DAILY
Status: DISCONTINUED | OUTPATIENT
Start: 2024-11-06 | End: 2024-11-11 | Stop reason: HOSPADM

## 2024-11-06 RX ADMIN — LEVOFLOXACIN 750 MG: 5 INJECTION, SOLUTION INTRAVENOUS at 05:11

## 2024-11-06 RX ADMIN — LEVALBUTEROL HYDROCHLORIDE 0.63 MG: 0.63 SOLUTION RESPIRATORY (INHALATION) at 08:11

## 2024-11-06 RX ADMIN — SUCRALFATE 1 G: 1 TABLET ORAL at 10:11

## 2024-11-06 RX ADMIN — SODIUM CHLORIDE: 9 INJECTION, SOLUTION INTRAVENOUS at 12:11

## 2024-11-06 RX ADMIN — SUCRALFATE 1 G: 1 TABLET ORAL at 05:11

## 2024-11-06 RX ADMIN — ARFORMOTEROL TARTRATE 15 MCG: 15 SOLUTION RESPIRATORY (INHALATION) at 08:11

## 2024-11-06 RX ADMIN — FUROSEMIDE 40 MG: 10 INJECTION, SOLUTION INTRAMUSCULAR; INTRAVENOUS at 10:11

## 2024-11-06 RX ADMIN — BUDESONIDE 1 MG: 0.5 INHALANT RESPIRATORY (INHALATION) at 08:11

## 2024-11-06 RX ADMIN — ARFORMOTEROL TARTRATE 15 MCG: 15 SOLUTION RESPIRATORY (INHALATION) at 07:11

## 2024-11-06 RX ADMIN — SACUBITRIL AND VALSARTAN 1 TABLET: 24; 26 TABLET, FILM COATED ORAL at 10:11

## 2024-11-06 RX ADMIN — METHYLPREDNISOLONE SODIUM SUCCINATE 80 MG: 40 INJECTION, POWDER, FOR SOLUTION INTRAMUSCULAR; INTRAVENOUS at 05:11

## 2024-11-06 RX ADMIN — DIPHENHYDRAMINE HYDROCHLORIDE 25 MG: 25 CAPSULE ORAL at 10:11

## 2024-11-06 RX ADMIN — MYCOPHENOLATE MOFETIL 1000 MG: 250 CAPSULE ORAL at 10:11

## 2024-11-06 RX ADMIN — FAMOTIDINE 40 MG: 20 TABLET ORAL at 09:11

## 2024-11-06 RX ADMIN — PREDNISONE 40 MG: 20 TABLET ORAL at 09:11

## 2024-11-06 RX ADMIN — ISOSORBIDE MONONITRATE 30 MG: 30 TABLET, EXTENDED RELEASE ORAL at 09:11

## 2024-11-06 RX ADMIN — ASPIRIN 81 MG CHEWABLE TABLET 81 MG: 81 TABLET CHEWABLE at 09:11

## 2024-11-06 RX ADMIN — LEVALBUTEROL HYDROCHLORIDE 0.63 MG: 0.63 SOLUTION RESPIRATORY (INHALATION) at 07:11

## 2024-11-06 RX ADMIN — LEVALBUTEROL HYDROCHLORIDE 0.63 MG: 0.63 SOLUTION RESPIRATORY (INHALATION) at 12:11

## 2024-11-06 RX ADMIN — METOPROLOL SUCCINATE 50 MG: 50 TABLET, EXTENDED RELEASE ORAL at 09:11

## 2024-11-06 RX ADMIN — TAMSULOSIN HYDROCHLORIDE 0.4 MG: 0.4 CAPSULE ORAL at 09:11

## 2024-11-06 RX ADMIN — INSULIN ASPART 5 UNITS: 100 INJECTION, SOLUTION INTRAVENOUS; SUBCUTANEOUS at 10:11

## 2024-11-06 RX ADMIN — INSULIN ASPART 10 UNITS: 100 INJECTION, SOLUTION INTRAVENOUS; SUBCUTANEOUS at 03:11

## 2024-11-06 RX ADMIN — AMLODIPINE BESYLATE 10 MG: 5 TABLET ORAL at 09:11

## 2024-11-06 RX ADMIN — MYCOPHENOLATE MOFETIL 500 MG: 250 CAPSULE ORAL at 09:11

## 2024-11-06 RX ADMIN — ATORVASTATIN CALCIUM 40 MG: 40 TABLET, FILM COATED ORAL at 10:11

## 2024-11-06 RX ADMIN — LEVALBUTEROL HYDROCHLORIDE 0.63 MG: 0.63 SOLUTION RESPIRATORY (INHALATION) at 02:11

## 2024-11-06 RX ADMIN — BUDESONIDE 1 MG: 0.5 INHALANT RESPIRATORY (INHALATION) at 07:11

## 2024-11-06 RX ADMIN — SUCRALFATE 1 G: 1 TABLET ORAL at 06:11

## 2024-11-06 RX ADMIN — SACUBITRIL AND VALSARTAN 1 TABLET: 24; 26 TABLET, FILM COATED ORAL at 09:11

## 2024-11-06 RX ADMIN — GUAIFENESIN 400 MG: 200 SOLUTION ORAL at 04:11

## 2024-11-06 RX ADMIN — ENOXAPARIN SODIUM 40 MG: 40 INJECTION SUBCUTANEOUS at 05:11

## 2024-11-06 NOTE — SUBJECTIVE & OBJECTIVE
Interval History: Patient seen and examined. Mildly increased WOB noted on exam but satting 100% on 3L NC. Diffuse crackles on auscultation. Pulmonology consulted.    Review of Systems   Constitutional:  Negative for chills, fatigue and fever.   HENT:  Positive for congestion.    Respiratory:  Positive for cough and shortness of breath.    Cardiovascular:  Negative for chest pain, palpitations and leg swelling.   Gastrointestinal:  Negative for abdominal pain, constipation, diarrhea, nausea and vomiting.     Objective:     Vital Signs (Most Recent):  Temp: 98.5 °F (36.9 °C) (11/06/24 0743)  Pulse: (!) 114 (11/06/24 0936)  Resp: 18 (11/06/24 0759)  BP: 138/85 (11/06/24 0936)  SpO2: 100 % (11/06/24 0815) Vital Signs (24h Range):  Temp:  [97.7 °F (36.5 °C)-98.5 °F (36.9 °C)] 98.5 °F (36.9 °C)  Pulse:  [] 114  Resp:  [18-28] 18  SpO2:  [88 %-100 %] 100 %  BP: (116-150)/(77-92) 138/85     Weight: 68 kg (150 lb)  Body mass index is 22.81 kg/m².    Intake/Output Summary (Last 24 hours) at 11/6/2024 0949  Last data filed at 11/5/2024 1550  Gross per 24 hour   Intake --   Output 750 ml   Net -750 ml         Physical Exam  Vitals and nursing note reviewed.   HENT:      Head: Normocephalic.      Mouth/Throat:      Mouth: Mucous membranes are moist.      Pharynx: Oropharynx is clear.   Eyes:      Conjunctiva/sclera: Conjunctivae normal.   Cardiovascular:      Rate and Rhythm: Regular rhythm. Tachycardia present.   Pulmonary:      Effort: No respiratory distress.      Breath sounds: No wheezing.      Comments: Mildly increased WOB noted. Diffuse crackles.   Abdominal:      General: Bowel sounds are normal.      Palpations: Abdomen is soft.   Neurological:      Mental Status: He is alert and oriented to person, place, and time.             Significant Labs: All pertinent labs within the past 24 hours have been reviewed.    Significant Imaging: I have reviewed all pertinent imaging results/findings within the past 24  hours.

## 2024-11-06 NOTE — SUBJECTIVE & OBJECTIVE
"Past Medical History:   Diagnosis Date    CAD (coronary artery disease)     Sees [facility], h/o stent    Diabetes mellitus     Hypertension     Kidney stone     Stroke     age 62       Past Surgical History:   Procedure Laterality Date    CARDIAC CATHETERIZATION      with stent    COLONOSCOPY N/A 03/27/2024    Procedure: COLONOSCOPY;  Surgeon: Ariela Castro MD;  Location: [facility] ENDO;  Service: Endoscopy;  Laterality: N/A;    ESOPHAGOGASTRODUODENOSCOPY N/A 03/27/2024    Procedure: EGD (ESOPHAGOGASTRODUODENOSCOPY);  Surgeon: Ariela Castro MD;  Location: [facility] ENDO;  Service: Endoscopy;  Laterality: N/A;    LEFT HEART CATHETERIZATION Left 9/10/2024    Procedure: Left heart cath;  Surgeon: Jemal Drummond MD;  Location: [facility] CATH LAB;  Service: Cardiology;  Laterality: Left;    SHOULDER SURGERY Right        Review of patient's allergies indicates:   Allergen Reactions    Dapagliflozin      Other reaction(s): Other (See Comments)    Pcn [penicillins]     Linagliptin Other (See Comments)     "it knocked me down", "it almost killed me"    Lisinopril Other (See Comments)     cough    Pantoprazole Hives       No current facility-administered medications on file prior to encounter.     Current Outpatient Medications on File Prior to Encounter   Medication Sig    albuterol-ipratropium (DUO-NEB) 2.5 mg-0.5 mg/3 mL nebulizer solution Take 3 mLs by nebulization every 6 (six) hours as needed for Wheezing. Rescue    alpha-D-galactosidase (BEANO ORAL) Take 1 tablet by mouth 2 (two) times a day.    amLODIPine (NORVASC) 10 MG tablet Take 1 tablet (10 mg total) by mouth once daily.    aspirin 81 MG Chew Take 81 mg by mouth once daily.    atorvastatin (LIPITOR) 40 MG tablet Take 1 tablet (40 mg total) by mouth every evening.    azithromycin (Z-ARA) 250 MG tablet Take 2 tablets by mouth on day 1; Take 1 tablet by mouth on days 2-5    cholecalciferol, vitamin D3, (VITAMIN D3) 50 mcg (2,000 unit) Cap capsule Take 1 capsule " by mouth once daily.    fluticasone-salmeterol diskus inhaler 250-50 mcg Inhale 1 puff into the lungs 2 (two) times daily. Controller    furosemide (LASIX) 20 MG tablet Take 1 tablet (20 mg total) by mouth once daily.    glimepiride (AMARYL) 2 MG tablet Take 1 tablet (2 mg total) by mouth 2 (two) times a day. Take with meals    isosorbide mononitrate (IMDUR) 30 MG 24 hr tablet Take 1 tablet (30 mg total) by mouth once daily.    metFORMIN (GLUCOPHAGE) 1000 MG tablet Take 1 tablet (1,000 mg total) by mouth 2 (two) times daily with meals.    methylPREDNISolone (MEDROL DOSEPACK) 4 mg tablet use as directed    metoprolol succinate (TOPROL-XL) 50 MG 24 hr tablet Take 1 tablet (50 mg total) by mouth once daily.    mirabegron (MYRBETRIQ) 25 mg Tb24 ER tablet Take 1 tablet (25 mg total) by mouth once daily.    mycophenolate (CELLCEPT) 250 mg Cap Take 2 capsules (500 mg total) by mouth 2 (two) times daily.    nitroGLYCERIN (NITROSTAT) 0.3 MG SL tablet Place 1 tablet (0.3 mg total) under the tongue every 5 (five) minutes as needed for Chest pain.    omeprazole (PRILOSEC) 40 MG capsule Take 40 mg by mouth every morning.    sacubitriL-valsartan (ENTRESTO) 24-26 mg per tablet Take 1 tablet by mouth 2 (two) times daily.    sucralfate (CARAFATE) 1 gram tablet Take 1 tablet (1 g total) by mouth 4 (four) times daily before meals and nightly.    tamsulosin (FLOMAX) 0.4 mg Cap Take 1 capsule (0.4 mg total) by mouth once daily.     Family History       Problem Relation (Age of Onset)    Cancer Mother    Colon cancer Sister          Tobacco Use    Smoking status: Never     Passive exposure: Never    Smokeless tobacco: Never   Substance and Sexual Activity    Alcohol use: No    Drug use: Never    Sexual activity: Not Currently     Review of Systems   Constitutional:  Positive for activity change (due to SOB), appetite change (due to cough/sob) and fatigue. Negative for chills, diaphoresis, fever and unexpected weight change.   HENT:   Positive for congestion. Negative for postnasal drip, rhinorrhea and sore throat.    Eyes:  Negative for visual disturbance.   Respiratory:  Positive for cough, chest tightness, shortness of breath and wheezing.    Cardiovascular:  Negative for chest pain, palpitations and leg swelling.   Gastrointestinal:  Negative for abdominal pain, constipation, diarrhea, nausea and vomiting.   Genitourinary:  Negative for dysuria.   Musculoskeletal:  Negative for arthralgias, gait problem and myalgias.   Neurological:  Negative for dizziness, syncope, light-headedness and headaches.   Psychiatric/Behavioral:  The patient is not nervous/anxious.      Objective:     Vital Signs (Most Recent):  Temp: 97.8 °F (36.6 °C) (11/05/24 2349)  Pulse: 108 (11/06/24 0312)  Resp: 18 (11/06/24 0012)  BP: 126/84 (11/05/24 2349)  SpO2: 97 % (11/06/24 0012) Vital Signs (24h Range):  Temp:  [97.7 °F (36.5 °C)-98.4 °F (36.9 °C)] 97.8 °F (36.6 °C)  Pulse:  [] 108  Resp:  [18-28] 18  SpO2:  [88 %-100 %] 97 %  BP: (116-150)/(77-92) 126/84     Weight: 68 kg (150 lb)  Body mass index is 22.81 kg/m².     Physical Exam  Vitals and nursing note reviewed.   Constitutional:       General: He is not in acute distress.     Appearance: He is ill-appearing. He is not toxic-appearing or diaphoretic.      Comments: Frail/thin appearing, frequent cough    HENT:      Head: Normocephalic and atraumatic.      Nose: Nose normal. No congestion or rhinorrhea.      Mouth/Throat:      Mouth: Mucous membranes are moist.      Pharynx: Oropharynx is clear. No oropharyngeal exudate or posterior oropharyngeal erythema.   Eyes:      General: No scleral icterus.     Extraocular Movements: Extraocular movements intact.      Conjunctiva/sclera: Conjunctivae normal.      Pupils: Pupils are equal, round, and reactive to light.   Neck:      Vascular: No carotid bruit.   Cardiovascular:      Rate and Rhythm: Normal rate and regular rhythm.      Pulses: Normal pulses.      Heart  sounds: No murmur heard.     No friction rub. No gallop.   Pulmonary:      Effort: No respiratory distress.      Breath sounds: Wheezing and rhonchi present. No rales.      Comments: Increased WOB when he has is coughing fits   Abdominal:      General: Abdomen is flat. Bowel sounds are normal.      Palpations: Abdomen is soft.      Tenderness: There is no abdominal tenderness. There is no guarding or rebound.   Musculoskeletal:         General: No swelling or deformity. Normal range of motion.      Cervical back: Normal range of motion and neck supple.      Right lower leg: No edema.      Left lower leg: No edema.   Skin:     General: Skin is warm and dry.      Capillary Refill: Capillary refill takes less than 2 seconds.      Coloration: Skin is pale.   Neurological:      General: No focal deficit present.      Mental Status: He is alert and oriented to person, place, and time.      Cranial Nerves: No cranial nerve deficit.      Motor: No weakness.      Coordination: Coordination normal.   Psychiatric:         Mood and Affect: Mood normal.         Behavior: Behavior normal.              CRANIAL NERVES     CN III, IV, VI   Pupils are equal, round, and reactive to light.       Recent Results (from the past 24 hours)   EKG 12-lead    Collection Time: 11/05/24  1:13 PM   Result Value Ref Range    QRS Duration 80 ms    [facility]HS QTC Calculation 443 ms   POCT glucose    Collection Time: 11/05/24  1:15 PM   Result Value Ref Range    POCT Glucose 157 (H) 70 - 110 mg/dL   POCT CMP    Collection Time: 11/05/24  1:19 PM   Result Value Ref Range    Albumin, POC 3.5 3.3 - 5.5 g/dL    Alkaline Phosphatase, POC 54 42 - 141 U/L    ALT (SGPT), POC 19 10 - 47 U/L    AST (SGOT), POC 26 11 - 38 U/L    POC BUN 10 7 - 22 mg/dL    Calcium, POC 10.3 8.0 - 10.3 mg/dL    POC Chloride 98 98 - 108 mmol/L    POC Creatinine 0.9 0.6 - 1.2 mg/dL    POC Glucose 147 (H) 73 - 118 mg/dL    POC Potassium 4.2 3.6 - 5.1 mmol/L    POC Sodium 142 128 - 145  mmol/L    Bilirubin, POC 0.9 0.2 - 1.6 mg/dL    POC TCO2 27 18 - 33 mmol/L    Protein, POC 7.0 6.4 - 8.1 g/dL   POCT CBC    Collection Time: 11/05/24  1:20 PM   Result Value Ref Range    Hematocrit      Hemoglobin      RBC      WBC      MCV      MCH, POC      MCHC      RDW-CV      Platelet Count, POC      MPV     Troponin ISTAT    Collection Time: 11/05/24  1:22 PM   Result Value Ref Range    POC Cardiac Troponin I 0.01 0.00 - 0.08 ng/mL    Sample unknown    POCT D Dimer    Collection Time: 11/05/24  1:39 PM   Result Value Ref Range    POC D- 0.0 - 450.0 ng/mL   POCT B-type natriuretic peptide (BNP)    Collection Time: 11/05/24  1:44 PM   Result Value Ref Range    POC B-Type Natriuretic Peptide 99.5 0.0 - 100.0 pg/mL   POCT URINALYSIS W/O SCOPE    Collection Time: 11/05/24  2:21 PM   Result Value Ref Range    Glucose, UA Trace (A) Negative    Bilirubin, UA Negative Negative    Ketones, UA Negative Negative    Spec Grav UA 1.015 1.005 - 1.030    Blood, UA Trace-lysed (A) Negative    PH, UA 7.0 5.0 - 8.0    Protein, UA 2+ (A) Negative    Urobilinogen, UA 0.2 <=1.0 E.U./dL    Nitrite, UA Negative Negative    Leukocytes, UA Negative Negative    Color, UA POC Yellow Yellow, Straw, Linda    Clarity, UA, POC Clear Clear   Procalcitonin    Collection Time: 11/05/24  5:16 PM   Result Value Ref Range    Procalcitonin 0.07 <0.25 ng/mL   ISTAT PROCEDURE    Collection Time: 11/05/24  5:30 PM   Result Value Ref Range    POC PH 7.366 7.35 - 7.45    POC PCO2 41.0 35 - 45 mmHg    POC PO2 33 (LL) 40 - 60 mmHg    POC HCO3 23.5 (L) 24 - 28 mmol/L    POC BE -2 -2 to 2 mmol/L    POC SATURATED O2 61 95 - 100 %    POC Lactate 4.59 (HH) 0.5 - 2.2 mmol/L    POC TCO2 25 24 - 29 mmol/L    Sample VENOUS     Site Other     Allens Test N/A    POCT Rapid Influenza A/B    Collection Time: 11/05/24  6:15 PM   Result Value Ref Range    Influenza B Ag negative Positive/Negative    Inflenza A Ag negative Positive/Negative   POCT Rapid Strep A     Collection Time: 11/05/24  6:15 PM   Result Value Ref Range    POC Rapid Strep A negative Positive/Negative   POCT COVID-19 Rapid Screening    Collection Time: 11/05/24  6:25 PM   Result Value Ref Range    POC Rapid COVID Negative Negative     Acceptable Yes    ISTAT PROCEDURE    Collection Time: 11/05/24  6:56 PM   Result Value Ref Range    POC PH 7.348 (L) 7.35 - 7.45    POC PCO2 43.4 35 - 45 mmHg    POC PO2 25 (LL) 40 - 60 mmHg    POC HCO3 23.8 (L) 24 - 28 mmol/L    POC BE -2 -2 to 2 mmol/L    POC SATURATED O2 43 95 - 100 %    POC Lactate 5.24 (HH) 0.5 - 2.2 mmol/L    POC TCO2 25 24 - 29 mmol/L    Sample VENOUS     Site Other     Allens Test N/A    Lactic acid, plasma    Collection Time: 11/05/24 10:29 PM   Result Value Ref Range    Lactate (Lactic Acid) 5.5 (HH) 0.5 - 2.2 mmol/L   Procalcitonin    Collection Time: 11/05/24 10:29 PM   Result Value Ref Range    Procalcitonin 0.07 <0.25 ng/mL   CBC Auto Differential    Collection Time: 11/05/24 10:29 PM   Result Value Ref Range    WBC 5.51 3.90 - 12.70 K/uL    RBC 4.82 4.60 - 6.20 M/uL    Hemoglobin 13.8 (L) 14.0 - 18.0 g/dL    Hematocrit 40.7 40.0 - 54.0 %    MCV 84 82 - 98 fL    MCH 28.6 27.0 - 31.0 pg    MCHC 33.9 32.0 - 36.0 g/dL    RDW 13.9 11.5 - 14.5 %    Platelets 180 150 - 450 K/uL    MPV 9.7 9.2 - 12.9 fL    Immature Granulocytes 0.4 0.0 - 0.5 %    Gran # (ANC) 4.8 1.8 - 7.7 K/uL    Immature Grans (Abs) 0.02 0.00 - 0.04 K/uL    Lymph # 0.7 (L) 1.0 - 4.8 K/uL    Mono # 0.0 (L) 0.3 - 1.0 K/uL    Eos # 0.0 0.0 - 0.5 K/uL    Baso # 0.00 0.00 - 0.20 K/uL    nRBC 0 0 /100 WBC    Gran % 86.7 (H) 38.0 - 73.0 %    Lymph % 12.2 (L) 18.0 - 48.0 %    Mono % 0.7 (L) 4.0 - 15.0 %    Eosinophil % 0.0 0.0 - 8.0 %    Basophil % 0.0 0.0 - 1.9 %    Differential Method Automated    Protime-INR    Collection Time: 11/05/24 10:29 PM   Result Value Ref Range    Prothrombin Time 12.5 9.0 - 12.5 sec    INR 1.2 0.8 - 1.2   Troponin I    Collection Time: 11/05/24  10:29 PM   Result Value Ref Range    Troponin I <0.006 0.000 - 0.026 ng/mL   Respiratory Infection Panel (PCR), Nasopharyngeal    Collection Time: 11/06/24 12:41 AM    Specimen: Nasopharyngeal Swab   Result Value Ref Range    Respiratory Infection Panel Source NP swab    Lactic acid, plasma    Collection Time: 11/06/24  2:08 AM   Result Value Ref Range    Lactate (Lactic Acid) 4.5 (HH) 0.5 - 2.2 mmol/L   Troponin I    Collection Time: 11/06/24  2:08 AM   Result Value Ref Range    Troponin I <0.006 0.000 - 0.026 ng/mL       Microbiology Results (last 7 days)       Procedure Component Value Units Date/Time    Respiratory Infection Panel (PCR), Nasopharyngeal [[id number]] Collected: 11/06/24 0041    Order Status: Completed Specimen: Nasopharyngeal Swab Updated: 11/06/24 0121     Respiratory Infection Panel Source NP swab    Narrative:      Assay not valid for lower respiratory specimens, alternate  testing required.    Blood Culture #1 **CANNOT BE ORDERED STAT** [[id number]] Collected: 11/05/24 1719    Order Status: Sent Specimen: Blood from Peripheral, Antecubital, Right Updated: 11/06/24 0010    Blood Culture #2 **CANNOT BE ORDERED STAT** [[id number]] Collected: 11/05/24 1715    Order Status: Sent Specimen: Blood from Peripheral, Antecubital, Right Updated: 11/06/24 0010            Imaging Results              X-Ray Chest AP Portable (Final result)  Result time 11/05/24 15:17:57      Final result by Loki Freeman MD (11/05/24 15:17:57)                   Impression:      Increase in diffuse bilateral airspace opacities.      Electronically signed by: Loki Freeman MD  Date:    11/05/2024  Time:    15:17               Narrative:    EXAMINATION:  XR CHEST AP PORTABLE    CLINICAL HISTORY:  Shortness of breath    TECHNIQUE:  Single frontal view of the chest was performed.    COMPARISON:  10/17/2024.    FINDINGS:  Monitoring EKG leads are present.  The trachea is unremarkable.  The cardiomediastinal silhouette is stable.   There is no evidence of free air beneath the hemidiaphragms.  There are no pleural effusions.  There is no evidence of a pneumothorax.  There is no evidence of pneumomediastinum.  There is increase in the appearance of diffuse bilateral airspace opacities.  There are degenerative changes in the osseous structures.

## 2024-11-06 NOTE — PLAN OF CARE
Problem: Adult Inpatient Plan of Care  Goal: Plan of Care Review  Outcome: Progressing  Flowsheets (Taken 11/6/2024 0359)  Plan of Care Reviewed With: patient  Goal: Optimal Comfort and Wellbeing  Outcome: Progressing  Intervention: Monitor Pain and Promote Comfort  Flowsheets (Taken 11/6/2024 0359)  Pain Management Interventions:   pillow support provided   quiet environment facilitated     Problem: COPD (Chronic Obstructive Pulmonary Disease)  Goal: Optimal Chronic Illness Coping  Outcome: Progressing

## 2024-11-06 NOTE — NURSING
[facility] [place]  Nurses Note -- 4 Eyes    Received pt from [place], alert and oriented. With 5L NC oxygen support. Placed on 3L NC, spo2 of 97% , not in distress. Skin is intact. On cardiac monitoring with continuous pulse ox, shane informed. On diabetic diet. Room oriented. Able to up to the toilet. Extensions tubing for oxygen applied. HOB elevated. Bed in low position. Instructed to call for assistance. Bed in low position. Call light within reach.      11/6/2024       Skin assessed on: Admit      [x] No Pressure Injuries Present    []Prevention Measures Documented    [] Yes LDA  for Pressure Injury Previously documented     [] Yes New Pressure Injury Discovered   [] LDA for New Pressure Injury Added      Attending RN:  Emi Mann RN     Second RN:  KANDI Wang

## 2024-11-06 NOTE — ASSESSMENT & PLAN NOTE
Patient with Hypercapnic and Hypoxic Respiratory failure which is Acute on chronic.  he is on home oxygen at 2 LPM. Supplemental oxygen was provided and noted- Oxygen Concentration (%):  [50] 50    .   Signs/symptoms of respiratory failure include- increased work of breathing and cough . Contributing diagnoses includes - Interstitial lung disease Labs and images were reviewed.     F/u with pulmonary consult.

## 2024-11-06 NOTE — PROGRESS NOTES
[facility] Medicine  Progress Note    Patient Name: Emmanuel Grant  MRN: [id number]  Patient Class: OP- Observation   Admission Date: 11/5/2024  Length of Stay: 0 days  Attending Physician: Kristian Han MD  Primary Care Provider: Wilfredo De Souza MD        Subjective:     Principal Problem:Acute on chronic hypoxic respiratory failure        HPI:  73 y.o. man with h/o NIDDM type 2 (A1c 8 in September 2024), CAD (with h/o stent), Essential HTN, h/o CVA, ILD complicated by chronic hypoxic respiratory failure (on 2L NC at home) and recently treated with MMF 500mg PO BID (started 9/2024) presents to the ER with worsening SOB and cough. COVID/Flu negative. Finished a round of Azithromycin. NO c/o Fevers or chills. NO N/V/Diarrhea. No chest pain. States can't stop sneezing or coughing.  [place] ED noted Sats upper 80s on his home 2L NC. D/w pulmonary on call Dr. Pérez who recommended transfer to [facility] and to start steroids, Levaquin 750mg IV qday and scheduled/PRN Duonebs.   Labs with normal WBC and stable H/H. Lytes unremarkable and renal function stable.  Glucose 147. VBG 7.3. D-Dimer normal and BNP normal Troponin negative x1 with NO STEMI on EKG.     CXR with:   Monitoring EKG leads are present. The trachea is unremarkable. The cardiomediastinal silhouette is stable. There is no evidence of free air beneath the hemidiaphragms. There are no pleural effusions. There is no evidence of a pneumothorax. There is no evidence of pneumomediastinum. There is increase in the appearance of diffuse bilateral airspace opacities. There are degenerative changes in the osseous structures.             Overview/Hospital Course:  No notes on file    Interval History: Patient seen and examined. Mildly increased WOB noted on exam but satting 100% on 3L NC. Diffuse crackles on auscultation. Pulmonology consulted.    Review of Systems   Constitutional:  Negative for chills, fatigue and fever.   HENT:  Positive for  See Anesthesia for VS and monitoring. Pt sedated. Final time out done.   congestion.    Respiratory:  Positive for cough and shortness of breath.    Cardiovascular:  Negative for chest pain, palpitations and leg swelling.   Gastrointestinal:  Negative for abdominal pain, constipation, diarrhea, nausea and vomiting.     Objective:     Vital Signs (Most Recent):  Temp: 98.5 °F (36.9 °C) (11/06/24 0743)  Pulse: (!) 114 (11/06/24 0936)  Resp: 18 (11/06/24 0759)  BP: 138/85 (11/06/24 0936)  SpO2: 100 % (11/06/24 0815) Vital Signs (24h Range):  Temp:  [97.7 °F (36.5 °C)-98.5 °F (36.9 °C)] 98.5 °F (36.9 °C)  Pulse:  [] 114  Resp:  [18-28] 18  SpO2:  [88 %-100 %] 100 %  BP: (116-150)/(77-92) 138/85     Weight: 68 kg (150 lb)  Body mass index is 22.81 kg/m².    Intake/Output Summary (Last 24 hours) at 11/6/2024 0949  Last data filed at 11/5/2024 1550  Gross per 24 hour   Intake --   Output 750 ml   Net -750 ml         Physical Exam  Vitals and nursing note reviewed.   HENT:      Head: Normocephalic.      Mouth/Throat:      Mouth: Mucous membranes are moist.      Pharynx: Oropharynx is clear.   Eyes:      Conjunctiva/sclera: Conjunctivae normal.   Cardiovascular:      Rate and Rhythm: Regular rhythm. Tachycardia present.   Pulmonary:      Effort: No respiratory distress.      Breath sounds: No wheezing.      Comments: Mildly increased WOB noted. Diffuse crackles.   Abdominal:      General: Bowel sounds are normal.      Palpations: Abdomen is soft.   Neurological:      Mental Status: He is alert and oriented to person, place, and time.             Significant Labs: All pertinent labs within the past 24 hours have been reviewed.    Significant Imaging: I have reviewed all pertinent imaging results/findings within the past 24 hours.    Assessment/Plan:      * Acute on chronic hypoxic respiratory failure  Patient with Hypercapnic and Hypoxic Respiratory failure which is Acute on chronic.  he is on home oxygen at 2 LPM. Supplemental oxygen was provided and noted- Oxygen Concentration (%):  [50]  50    .   Signs/symptoms of respiratory failure include- increased work of breathing and cough . Contributing diagnoses includes - Interstitial lung disease Labs and images were reviewed.     F/u with pulmonary consult.     Essential hypertension  Patients blood pressure range in the last 24 hours was: BP  Min: 116/83  Max: 150/89.The patient's inpatient anti-hypertensive regimen is listed below:  Current Antihypertensives  hydrALAZINE injection 5 mg, Every 6 hours PRN, Intravenous  furosemide injection 40 mg, Daily, Intravenous  amLODIPine tablet 10 mg, Daily, Oral  isosorbide mononitrate 24 hr tablet 30 mg, Daily, Oral  metoprolol succinate (TOPROL-XL) 24 hr tablet 50 mg, Daily, Oral  nitroGLYCERIN SL tablet 0.4 mg, Every 5 min PRN, Sublingual    Plan  - BP is controlled, no changes needed to their regimen  - Diet adjusted    Coronary artery disease involving native coronary artery of native heart with angina pectoris  Patient with known CAD s/p stent placement, which is controlled Will continue ASA and Statin and monitor for S/Sx of angina/ACS. Continue to monitor on telemetry. Troponin negative x1 and will f/u on repeat troponin levels. Monitor on Tele. NO c/o chest pain at this time.     Interstitial lung disease exacerbation  F/u with pulmonary consult. Cont. Steroids, IV lasix, IV abx and nebs per pulmonary recs. Cont. MMF 500mg PO BID for now.     Results for orders placed during the hospital encounter of 10/17/24    Echo    Interpretation Summary    Left Ventricle: Regional wall motion abnormalities present. There is normal systolic function with a visually estimated ejection fraction of 55 %. Grade I diastolic dysfunction. federico septal hypokinesis    Right Ventricle: Normal right ventricular cavity size. Systolic function is normal.    Left Atrium: Left atrium is mildly dilated.    IVC/SVC: Normal venous pressure at 3 mmHg.      Type 2 diabetes mellitus without complication, without long-term current use  of insulin  Patient's FSGs are controlled on current medication regimen.  Last A1c reviewed-   Lab Results   Component Value Date    HGBA1C 8.0 (H) 09/25/2024     Most recent fingerstick glucose reviewed-   Recent Labs   Lab 11/05/24  1315   POCTGLUCOSE 157*     Current correctional scale  Low  Maintain anti-hyperglycemic dose as follows-   Antihyperglycemics (From admission, onward)      Start     Stop Route Frequency Ordered    11/06/24 0023  insulin aspart U-100 pen 0-5 Units         -- SubQ Before meals & nightly PRN 11/05/24 2323          Hold Oral hypoglycemics while patient is in the hospital. She takes Amaryl 2mg PO BID and Metformin 1gm PO BID       VTE Risk Mitigation (From admission, onward)           Ordered     enoxaparin injection 40 mg  Daily         11/05/24 1726     IP VTE HIGH RISK PATIENT  Once         11/05/24 1726     Place sequential compression device  Until discontinued         11/05/24 1726                    Discharge Planning   DERIC:      Code Status: Full Code   Is the patient medically ready for discharge?:     Reason for patient still in hospital (select all that apply): Treatment and Consult recommendations                     Umang Miller PA-C  Department of Hospital Medicine   [facility]

## 2024-11-06 NOTE — NURSING
Cough noted, breathing treatment scheduled given. Respiratory panel collected as ordered and sent to lab.

## 2024-11-06 NOTE — H&P
[facility] Medicine  History & Physical    Patient Name: Emmanuel Grant  MRN: [id number]  Patient Class: OP- Observation  Admission Date: 11/5/2024  Attending Physician: Dr. Es Lipscomb   Primary Care Provider: Wilfredo De Souza MD         Patient information was obtained from patient, past medical records, and ER records.     Subjective:     Principal Problem:Cough and SOB    Chief Complaint:   Chief Complaint   Patient presents with    Shortness of Breath     Pt reports a cough this am,  Pt reports SOB since walking in   Pt huffing and puffing in triage  Pt unable to complete a sentence without having to gasping for air   Pt supposed to be on 2 liters nasal cannula but did not bring it with him         HPI: 73 y.o. man with h/o NIDDM type 2 (A1c 8 in September 2024), CAD (with h/o stent), Essential HTN, h/o CVA, ILD complicated by chronic hypoxic respiratory failure (on 2L NC at home) and recently treated with MMF 500mg PO BID (started 9/2024) presents to the ER with worsening SOB and cough. COVID/Flu negative. Finished a round of Azithromycin. NO c/o Fevers or chills. NO N/V/Diarrhea. No chest pain. States can't stop sneezing or coughing.  [place] ED noted Sats upper 80s on his home 2L NC. D/w pulmonary on call Dr. Pérez who recommended transfer to [facility] and to start steroids, Levaquin 750mg IV qday and scheduled/PRN Duonebs.   Labs with normal WBC and stable H/H. Lytes unremarkable and renal function stable.  Glucose 147. VBG 7.3. D-Dimer normal and BNP normal Troponin negative x1 with NO STEMI on EKG.     CXR with:   Monitoring EKG leads are present. The trachea is unremarkable. The cardiomediastinal silhouette is stable. There is no evidence of free air beneath the hemidiaphragms. There are no pleural effusions. There is no evidence of a pneumothorax. There is no evidence of pneumomediastinum. There is increase in the appearance of diffuse bilateral airspace opacities. There are  "degenerative changes in the osseous structures.             Past Medical History:   Diagnosis Date    CAD (coronary artery disease)     Sees [facility], h/o stent    Diabetes mellitus     Hypertension     Kidney stone     Stroke     age 62       Past Surgical History:   Procedure Laterality Date    CARDIAC CATHETERIZATION      with stent    COLONOSCOPY N/A 03/27/2024    Procedure: COLONOSCOPY;  Surgeon: Ariela Castro MD;  Location: [facility] ENDO;  Service: Endoscopy;  Laterality: N/A;    ESOPHAGOGASTRODUODENOSCOPY N/A 03/27/2024    Procedure: EGD (ESOPHAGOGASTRODUODENOSCOPY);  Surgeon: Ariela Castro MD;  Location: [facility] ENDO;  Service: Endoscopy;  Laterality: N/A;    LEFT HEART CATHETERIZATION Left 9/10/2024    Procedure: Left heart cath;  Surgeon: Jemal Drummond MD;  Location: [facility] CATH LAB;  Service: Cardiology;  Laterality: Left;    SHOULDER SURGERY Right        Review of patient's allergies indicates:   Allergen Reactions    Dapagliflozin      Other reaction(s): Other (See Comments)    Pcn [penicillins]     Linagliptin Other (See Comments)     "it knocked me down", "it almost killed me"    Lisinopril Other (See Comments)     cough    Pantoprazole Hives       No current facility-administered medications on file prior to encounter.     Current Outpatient Medications on File Prior to Encounter   Medication Sig    albuterol-ipratropium (DUO-NEB) 2.5 mg-0.5 mg/3 mL nebulizer solution Take 3 mLs by nebulization every 6 (six) hours as needed for Wheezing. Rescue    alpha-D-galactosidase (BEANO ORAL) Take 1 tablet by mouth 2 (two) times a day.    amLODIPine (NORVASC) 10 MG tablet Take 1 tablet (10 mg total) by mouth once daily.    aspirin 81 MG Chew Take 81 mg by mouth once daily.    atorvastatin (LIPITOR) 40 MG tablet Take 1 tablet (40 mg total) by mouth every evening.    azithromycin (Z-ARA) 250 MG tablet Take 2 tablets by mouth on day 1; Take 1 tablet by mouth on days 2-5    cholecalciferol, vitamin D3, " (VITAMIN D3) 50 mcg (2,000 unit) Cap capsule Take 1 capsule by mouth once daily.    fluticasone-salmeterol diskus inhaler 250-50 mcg Inhale 1 puff into the lungs 2 (two) times daily. Controller    furosemide (LASIX) 20 MG tablet Take 1 tablet (20 mg total) by mouth once daily.    glimepiride (AMARYL) 2 MG tablet Take 1 tablet (2 mg total) by mouth 2 (two) times a day. Take with meals    isosorbide mononitrate (IMDUR) 30 MG 24 hr tablet Take 1 tablet (30 mg total) by mouth once daily.    metFORMIN (GLUCOPHAGE) 1000 MG tablet Take 1 tablet (1,000 mg total) by mouth 2 (two) times daily with meals.    methylPREDNISolone (MEDROL DOSEPACK) 4 mg tablet use as directed    metoprolol succinate (TOPROL-XL) 50 MG 24 hr tablet Take 1 tablet (50 mg total) by mouth once daily.    mirabegron (MYRBETRIQ) 25 mg Tb24 ER tablet Take 1 tablet (25 mg total) by mouth once daily.    mycophenolate (CELLCEPT) 250 mg Cap Take 2 capsules (500 mg total) by mouth 2 (two) times daily.    nitroGLYCERIN (NITROSTAT) 0.3 MG SL tablet Place 1 tablet (0.3 mg total) under the tongue every 5 (five) minutes as needed for Chest pain.    omeprazole (PRILOSEC) 40 MG capsule Take 40 mg by mouth every morning.    sacubitriL-valsartan (ENTRESTO) 24-26 mg per tablet Take 1 tablet by mouth 2 (two) times daily.    sucralfate (CARAFATE) 1 gram tablet Take 1 tablet (1 g total) by mouth 4 (four) times daily before meals and nightly.    tamsulosin (FLOMAX) 0.4 mg Cap Take 1 capsule (0.4 mg total) by mouth once daily.     Family History       Problem Relation (Age of Onset)    Cancer Mother    Colon cancer Sister          Tobacco Use    Smoking status: Never     Passive exposure: Never    Smokeless tobacco: Never   Substance and Sexual Activity    Alcohol use: No    Drug use: Never    Sexual activity: Not Currently     Review of Systems   Constitutional:  Positive for activity change (due to SOB), appetite change (due to cough/sob) and fatigue. Negative for chills,  diaphoresis, fever and unexpected weight change.   HENT:  Positive for congestion. Negative for postnasal drip, rhinorrhea and sore throat.    Eyes:  Negative for visual disturbance.   Respiratory:  Positive for cough, chest tightness, shortness of breath and wheezing.    Cardiovascular:  Negative for chest pain, palpitations and leg swelling.   Gastrointestinal:  Negative for abdominal pain, constipation, diarrhea, nausea and vomiting.   Genitourinary:  Negative for dysuria.   Musculoskeletal:  Negative for arthralgias, gait problem and myalgias.   Neurological:  Negative for dizziness, syncope, light-headedness and headaches.   Psychiatric/Behavioral:  The patient is not nervous/anxious.      Objective:     Vital Signs (Most Recent):  Temp: 97.8 °F (36.6 °C) (11/05/24 2349)  Pulse: 108 (11/06/24 0312)  Resp: 18 (11/06/24 0012)  BP: 126/84 (11/05/24 2349)  SpO2: 97 % (11/06/24 0012) Vital Signs (24h Range):  Temp:  [97.7 °F (36.5 °C)-98.4 °F (36.9 °C)] 97.8 °F (36.6 °C)  Pulse:  [] 108  Resp:  [18-28] 18  SpO2:  [88 %-100 %] 97 %  BP: (116-150)/(77-92) 126/84     Weight: 68 kg (150 lb)  Body mass index is 22.81 kg/m².     Physical Exam  Vitals and nursing note reviewed.   Constitutional:       General: He is not in acute distress.     Appearance: He is ill-appearing. He is not toxic-appearing or diaphoretic.      Comments: Frail/thin appearing, frequent cough    HENT:      Head: Normocephalic and atraumatic.      Nose: Nose normal. No congestion or rhinorrhea.      Mouth/Throat:      Mouth: Mucous membranes are moist.      Pharynx: Oropharynx is clear. No oropharyngeal exudate or posterior oropharyngeal erythema.   Eyes:      General: No scleral icterus.     Extraocular Movements: Extraocular movements intact.      Conjunctiva/sclera: Conjunctivae normal.      Pupils: Pupils are equal, round, and reactive to light.   Neck:      Vascular: No carotid bruit.   Cardiovascular:      Rate and Rhythm: Normal rate  and regular rhythm.      Pulses: Normal pulses.      Heart sounds: No murmur heard.     No friction rub. No gallop.   Pulmonary:      Effort: No respiratory distress.      Breath sounds: Wheezing and rhonchi present. No rales.      Comments: Increased WOB when he has is coughing fits   Abdominal:      General: Abdomen is flat. Bowel sounds are normal.      Palpations: Abdomen is soft.      Tenderness: There is no abdominal tenderness. There is no guarding or rebound.   Musculoskeletal:         General: No swelling or deformity. Normal range of motion.      Cervical back: Normal range of motion and neck supple.      Right lower leg: No edema.      Left lower leg: No edema.   Skin:     General: Skin is warm and dry.      Capillary Refill: Capillary refill takes less than 2 seconds.      Coloration: Skin is pale.   Neurological:      General: No focal deficit present.      Mental Status: He is alert and oriented to person, place, and time.      Cranial Nerves: No cranial nerve deficit.      Motor: No weakness.      Coordination: Coordination normal.   Psychiatric:         Mood and Affect: Mood normal.         Behavior: Behavior normal.              CRANIAL NERVES     CN III, IV, VI   Pupils are equal, round, and reactive to light.       Recent Results (from the past 24 hours)   EKG 12-lead    Collection Time: 11/05/24  1:13 PM   Result Value Ref Range    QRS Duration 80 ms    [facility] QTC Calculation 443 ms   POCT glucose    Collection Time: 11/05/24  1:15 PM   Result Value Ref Range    POCT Glucose 157 (H) 70 - 110 mg/dL   POCT CMP    Collection Time: 11/05/24  1:19 PM   Result Value Ref Range    Albumin, POC 3.5 3.3 - 5.5 g/dL    Alkaline Phosphatase, POC 54 42 - 141 U/L    ALT (SGPT), POC 19 10 - 47 U/L    AST (SGOT), POC 26 11 - 38 U/L    POC BUN 10 7 - 22 mg/dL    Calcium, POC 10.3 8.0 - 10.3 mg/dL    POC Chloride 98 98 - 108 mmol/L    POC Creatinine 0.9 0.6 - 1.2 mg/dL    POC Glucose 147 (H) 73 - 118 mg/dL    POC  Potassium 4.2 3.6 - 5.1 mmol/L    POC Sodium 142 128 - 145 mmol/L    Bilirubin, POC 0.9 0.2 - 1.6 mg/dL    POC TCO2 27 18 - 33 mmol/L    Protein, POC 7.0 6.4 - 8.1 g/dL   POCT CBC    Collection Time: 11/05/24  1:20 PM   Result Value Ref Range    Hematocrit      Hemoglobin      RBC      WBC      MCV      MCH, POC      MCHC      RDW-CV      Platelet Count, POC      MPV     Troponin ISTAT    Collection Time: 11/05/24  1:22 PM   Result Value Ref Range    POC Cardiac Troponin I 0.01 0.00 - 0.08 ng/mL    Sample unknown    POCT D Dimer    Collection Time: 11/05/24  1:39 PM   Result Value Ref Range    POC D- 0.0 - 450.0 ng/mL   POCT B-type natriuretic peptide (BNP)    Collection Time: 11/05/24  1:44 PM   Result Value Ref Range    POC B-Type Natriuretic Peptide 99.5 0.0 - 100.0 pg/mL   POCT URINALYSIS W/O SCOPE    Collection Time: 11/05/24  2:21 PM   Result Value Ref Range    Glucose, UA Trace (A) Negative    Bilirubin, UA Negative Negative    Ketones, UA Negative Negative    Spec Grav UA 1.015 1.005 - 1.030    Blood, UA Trace-lysed (A) Negative    PH, UA 7.0 5.0 - 8.0    Protein, UA 2+ (A) Negative    Urobilinogen, UA 0.2 <=1.0 E.U./dL    Nitrite, UA Negative Negative    Leukocytes, UA Negative Negative    Color, UA POC Yellow Yellow, Straw, Linda    Clarity, UA, POC Clear Clear   Procalcitonin    Collection Time: 11/05/24  5:16 PM   Result Value Ref Range    Procalcitonin 0.07 <0.25 ng/mL   ISTAT PROCEDURE    Collection Time: 11/05/24  5:30 PM   Result Value Ref Range    POC PH 7.366 7.35 - 7.45    POC PCO2 41.0 35 - 45 mmHg    POC PO2 33 (LL) 40 - 60 mmHg    POC HCO3 23.5 (L) 24 - 28 mmol/L    POC BE -2 -2 to 2 mmol/L    POC SATURATED O2 61 95 - 100 %    POC Lactate 4.59 (HH) 0.5 - 2.2 mmol/L    POC TCO2 25 24 - 29 mmol/L    Sample VENOUS     Site Other     Allens Test N/A    POCT Rapid Influenza A/B    Collection Time: 11/05/24  6:15 PM   Result Value Ref Range    Influenza B Ag negative Positive/Negative     Inflenza A Ag negative Positive/Negative   POCT Rapid Strep A    Collection Time: 11/05/24  6:15 PM   Result Value Ref Range    POC Rapid Strep A negative Positive/Negative   POCT COVID-19 Rapid Screening    Collection Time: 11/05/24  6:25 PM   Result Value Ref Range    POC Rapid COVID Negative Negative     Acceptable Yes    ISTAT PROCEDURE    Collection Time: 11/05/24  6:56 PM   Result Value Ref Range    POC PH 7.348 (L) 7.35 - 7.45    POC PCO2 43.4 35 - 45 mmHg    POC PO2 25 (LL) 40 - 60 mmHg    POC HCO3 23.8 (L) 24 - 28 mmol/L    POC BE -2 -2 to 2 mmol/L    POC SATURATED O2 43 95 - 100 %    POC Lactate 5.24 (HH) 0.5 - 2.2 mmol/L    POC TCO2 25 24 - 29 mmol/L    Sample VENOUS     Site Other     Allens Test N/A    Lactic acid, plasma    Collection Time: 11/05/24 10:29 PM   Result Value Ref Range    Lactate (Lactic Acid) 5.5 (HH) 0.5 - 2.2 mmol/L   Procalcitonin    Collection Time: 11/05/24 10:29 PM   Result Value Ref Range    Procalcitonin 0.07 <0.25 ng/mL   CBC Auto Differential    Collection Time: 11/05/24 10:29 PM   Result Value Ref Range    WBC 5.51 3.90 - 12.70 K/uL    RBC 4.82 4.60 - 6.20 M/uL    Hemoglobin 13.8 (L) 14.0 - 18.0 g/dL    Hematocrit 40.7 40.0 - 54.0 %    MCV 84 82 - 98 fL    MCH 28.6 27.0 - 31.0 pg    MCHC 33.9 32.0 - 36.0 g/dL    RDW 13.9 11.5 - 14.5 %    Platelets 180 150 - 450 K/uL    MPV 9.7 9.2 - 12.9 fL    Immature Granulocytes 0.4 0.0 - 0.5 %    Gran # (ANC) 4.8 1.8 - 7.7 K/uL    Immature Grans (Abs) 0.02 0.00 - 0.04 K/uL    Lymph # 0.7 (L) 1.0 - 4.8 K/uL    Mono # 0.0 (L) 0.3 - 1.0 K/uL    Eos # 0.0 0.0 - 0.5 K/uL    Baso # 0.00 0.00 - 0.20 K/uL    nRBC 0 0 /100 WBC    Gran % 86.7 (H) 38.0 - 73.0 %    Lymph % 12.2 (L) 18.0 - 48.0 %    Mono % 0.7 (L) 4.0 - 15.0 %    Eosinophil % 0.0 0.0 - 8.0 %    Basophil % 0.0 0.0 - 1.9 %    Differential Method Automated    Protime-INR    Collection Time: 11/05/24 10:29 PM   Result Value Ref Range    Prothrombin Time 12.5 9.0 - 12.5 sec     INR 1.2 0.8 - 1.2   Troponin I    Collection Time: 11/05/24 10:29 PM   Result Value Ref Range    Troponin I <0.006 0.000 - 0.026 ng/mL   Respiratory Infection Panel (PCR), Nasopharyngeal    Collection Time: 11/06/24 12:41 AM    Specimen: Nasopharyngeal Swab   Result Value Ref Range    Respiratory Infection Panel Source NP swab    Lactic acid, plasma    Collection Time: 11/06/24  2:08 AM   Result Value Ref Range    Lactate (Lactic Acid) 4.5 (HH) 0.5 - 2.2 mmol/L   Troponin I    Collection Time: 11/06/24  2:08 AM   Result Value Ref Range    Troponin I <0.006 0.000 - 0.026 ng/mL       Microbiology Results (last 7 days)       Procedure Component Value Units Date/Time    Respiratory Infection Panel (PCR), Nasopharyngeal [[id number]] Collected: 11/06/24 0041    Order Status: Completed Specimen: Nasopharyngeal Swab Updated: 11/06/24 0121     Respiratory Infection Panel Source NP swab    Narrative:      Assay not valid for lower respiratory specimens, alternate  testing required.    Blood Culture #1 **CANNOT BE ORDERED STAT** [[id number]] Collected: 11/05/24 1719    Order Status: Sent Specimen: Blood from Peripheral, Antecubital, Right Updated: 11/06/24 0010    Blood Culture #2 **CANNOT BE ORDERED STAT** [[id number]] Collected: 11/05/24 1715    Order Status: Sent Specimen: Blood from Peripheral, Antecubital, Right Updated: 11/06/24 0010            Imaging Results              X-Ray Chest AP Portable (Final result)  Result time 11/05/24 15:17:57      Final result by Loki Freeman MD (11/05/24 15:17:57)                   Impression:      Increase in diffuse bilateral airspace opacities.      Electronically signed by: Loki Freeman MD  Date:    11/05/2024  Time:    15:17               Narrative:    EXAMINATION:  XR CHEST AP PORTABLE    CLINICAL HISTORY:  Shortness of breath    TECHNIQUE:  Single frontal view of the chest was performed.    COMPARISON:  10/17/2024.    FINDINGS:  Monitoring EKG leads are present.  The trachea  is unremarkable.  The cardiomediastinal silhouette is stable.  There is no evidence of free air beneath the hemidiaphragms.  There are no pleural effusions.  There is no evidence of a pneumothorax.  There is no evidence of pneumomediastinum.  There is increase in the appearance of diffuse bilateral airspace opacities.  There are degenerative changes in the osseous structures.                                        Assessment/Plan:     * Acute on chronic hypoxic respiratory failure  Patient with Hypercapnic and Hypoxic Respiratory failure which is Acute on chronic.  he is on home oxygen at 2 LPM. Supplemental oxygen was provided and noted- Oxygen Concentration (%):  [50] 50    .   Signs/symptoms of respiratory failure include- increased work of breathing and cough . Contributing diagnoses includes - Interstitial lung disease Labs and images were reviewed.     F/u with pulmonary consult.     Interstitial lung disease exacerbation  F/u with pulmonary consult. Cont. Steroids, IV lasix, IV abx and nebs per pulmonary recs. Cont. MMF 500mg PO BID for now.     Results for orders placed during the hospital encounter of 10/17/24    Echo    Interpretation Summary    Left Ventricle: Regional wall motion abnormalities present. There is normal systolic function with a visually estimated ejection fraction of 55 %. Grade I diastolic dysfunction. federico septal hypokinesis    Right Ventricle: Normal right ventricular cavity size. Systolic function is normal.    Left Atrium: Left atrium is mildly dilated.    IVC/SVC: Normal venous pressure at 3 mmHg.      Type 2 diabetes mellitus without complication, without long-term current use of insulin  Patient's FSGs are controlled on current medication regimen.  Last A1c reviewed-   Lab Results   Component Value Date    HGBA1C 8.0 (H) 09/25/2024     Most recent fingerstick glucose reviewed-   Recent Labs   Lab 11/05/24  1315   POCTGLUCOSE 157*     Current correctional scale  Low  Maintain  anti-hyperglycemic dose as follows-   Antihyperglycemics (From admission, onward)      Start     Stop Route Frequency Ordered    11/06/24 0023  insulin aspart U-100 pen 0-5 Units         -- SubQ Before meals & nightly PRN 11/05/24 2323          Hold Oral hypoglycemics while patient is in the hospital. She takes Amaryl 2mg PO BID and Metformin 1gm PO BID     Coronary artery disease involving native coronary artery of native heart with angina pectoris  Patient with known CAD s/p stent placement, which is controlled Will continue ASA and Statin and monitor for S/Sx of angina/ACS. Continue to monitor on telemetry. Troponin negative x1 and will f/u on repeat troponin levels. Monitor on Tele. NO c/o chest pain at this time.     Essential hypertension  Patients blood pressure range in the last 24 hours was: BP  Min: 116/83  Max: 150/89.The patient's inpatient anti-hypertensive regimen is listed below:  Current Antihypertensives  hydrALAZINE injection 5 mg, Every 6 hours PRN, Intravenous  furosemide injection 40 mg, Daily, Intravenous  amLODIPine tablet 10 mg, Daily, Oral  isosorbide mononitrate 24 hr tablet 30 mg, Daily, Oral  metoprolol succinate (TOPROL-XL) 24 hr tablet 50 mg, Daily, Oral  nitroGLYCERIN SL tablet 0.4 mg, Every 5 min PRN, Sublingual    Plan  - BP is controlled, no changes needed to their regimen  - Diet adjusted      VTE Risk Mitigation (From admission, onward)           Ordered     enoxaparin injection 40 mg  Daily         11/05/24 1726     IP VTE HIGH RISK PATIENT  Once         11/05/24 1726     Place sequential compression device  Until discontinued         11/05/24 1726                     CODE STATUS: FULL CODE   On 11/05/2024, patient should be placed in hospital observation services under my care.             Es Lipsocmb MD  Department of Hospital Medicine  [facility] - [place] - Med Surg

## 2024-11-06 NOTE — HPI
73 y.o. man with h/o NIDDM type 2 (A1c 8 in September 2024), CAD (with h/o stent), Essential HTN, h/o CVA, ILD complicated by chronic hypoxic respiratory failure (on 2L NC at home) and recently treated with MMF 500mg PO BID (started 9/2024) presents to the ER with worsening SOB and cough. COVID/Flu negative. Finished a round of Azithromycin. NO c/o Fevers or chills. NO N/V/Diarrhea. No chest pain. States can't stop sneezing or coughing.  [place] ED noted Sats upper 80s on his home 2L NC. D/w pulmonary on call Dr. Pérez who recommended transfer to [facility] and to start steroids, Levaquin 750mg IV qday and scheduled/PRN Duonebs.   Labs with normal WBC and stable H/H. Lytes unremarkable and renal function stable.  Glucose 147. VBG 7.3. D-Dimer normal and BNP normal Troponin negative x1 with NO STEMI on EKG.     CXR with:   Monitoring EKG leads are present. The trachea is unremarkable. The cardiomediastinal silhouette is stable. There is no evidence of free air beneath the hemidiaphragms. There are no pleural effusions. There is no evidence of a pneumothorax. There is no evidence of pneumomediastinum. There is increase in the appearance of diffuse bilateral airspace opacities. There are degenerative changes in the osseous structures.

## 2024-11-06 NOTE — ASSESSMENT & PLAN NOTE
F/u with pulmonary consult. Cont. Steroids, IV lasix, IV abx and nebs per pulmonary recs. Cont. MMF 500mg PO BID for now.     Results for orders placed during the hospital encounter of 10/17/24    Echo    Interpretation Summary    Left Ventricle: Regional wall motion abnormalities present. There is normal systolic function with a visually estimated ejection fraction of 55 %. Grade I diastolic dysfunction. federico septal hypokinesis    Right Ventricle: Normal right ventricular cavity size. Systolic function is normal.    Left Atrium: Left atrium is mildly dilated.    IVC/SVC: Normal venous pressure at 3 mmHg.

## 2024-11-06 NOTE — ED NOTES
Attempt made to call report; per , pt's nurse is currently making rounds; will call back at later time

## 2024-11-06 NOTE — ASSESSMENT & PLAN NOTE
Patient with known CAD s/p stent placement, which is controlled Will continue ASA and Statin and monitor for S/Sx of angina/ACS. Continue to monitor on telemetry. Troponin negative x1 and will f/u on repeat troponin levels. Monitor on Tele. NO c/o chest pain at this time.

## 2024-11-06 NOTE — ASSESSMENT & PLAN NOTE
Patients blood pressure range in the last 24 hours was: BP  Min: 116/83  Max: 150/89.The patient's inpatient anti-hypertensive regimen is listed below:  Current Antihypertensives  hydrALAZINE injection 5 mg, Every 6 hours PRN, Intravenous  furosemide injection 40 mg, Daily, Intravenous  amLODIPine tablet 10 mg, Daily, Oral  isosorbide mononitrate 24 hr tablet 30 mg, Daily, Oral  metoprolol succinate (TOPROL-XL) 24 hr tablet 50 mg, Daily, Oral  nitroGLYCERIN SL tablet 0.4 mg, Every 5 min PRN, Sublingual    Plan  - BP is controlled, no changes needed to their regimen  - Diet adjusted

## 2024-11-06 NOTE — ASSESSMENT & PLAN NOTE
Patient's FSGs are controlled on current medication regimen.  Last A1c reviewed-   Lab Results   Component Value Date    HGBA1C 8.0 (H) 09/25/2024     Most recent fingerstick glucose reviewed-   Recent Labs   Lab 11/05/24  1315   POCTGLUCOSE 157*     Current correctional scale  Low  Maintain anti-hyperglycemic dose as follows-   Antihyperglycemics (From admission, onward)      Start     Stop Route Frequency Ordered    11/06/24 0023  insulin aspart U-100 pen 0-5 Units         -- SubQ Before meals & nightly PRN 11/05/24 2323          Hold Oral hypoglycemics while patient is in the hospital. She takes Amaryl 2mg PO BID and Metformin 1gm PO BID

## 2024-11-06 NOTE — NURSING
[facility]  Nurses Note -- 4 Eyes      11/6/2024       Skin assessed on: Q Shift      [x] No Pressure Injuries Present    []Prevention Measures Documented    [] Yes LDA  for Pressure Injury Previously documented     [] Yes New Pressure Injury Discovered   [] LDA for New Pressure Injury Added      Attending RN:  Devante Garnica LPN     Second RN:  KANDI Middleton

## 2024-11-07 LAB
POCT GLUCOSE: 320 MG/DL (ref 70–110)
POCT GLUCOSE: 323 MG/DL (ref 70–110)
POCT GLUCOSE: 332 MG/DL (ref 70–110)
POCT GLUCOSE: 364 MG/DL (ref 70–110)
POCT GLUCOSE: 426 MG/DL (ref 70–110)
POCT GLUCOSE: 485 MG/DL (ref 70–110)

## 2024-11-07 PROCEDURE — 21400001 HC TELEMETRY ROOM: Mod: HCNC

## 2024-11-07 PROCEDURE — 94760 N-INVAS EAR/PLS OXIMETRY 1: CPT | Mod: HCNC

## 2024-11-07 PROCEDURE — 63600175 PHARM REV CODE 636 W HCPCS: Mod: HCNC

## 2024-11-07 PROCEDURE — 25000242 PHARM REV CODE 250 ALT 637 W/ HCPCS: Mod: HCNC

## 2024-11-07 PROCEDURE — 99233 SBSQ HOSP IP/OBS HIGH 50: CPT | Mod: HCNC,,, | Performed by: INTERNAL MEDICINE

## 2024-11-07 PROCEDURE — 25000003 PHARM REV CODE 250: Mod: HCNC | Performed by: INTERNAL MEDICINE

## 2024-11-07 PROCEDURE — 94640 AIRWAY INHALATION TREATMENT: CPT | Mod: HCNC

## 2024-11-07 PROCEDURE — 94640 AIRWAY INHALATION TREATMENT: CPT | Mod: HCNC,ER

## 2024-11-07 PROCEDURE — 99900031 HC PATIENT EDUCATION (STAT): Mod: HCNC

## 2024-11-07 PROCEDURE — 25000242 PHARM REV CODE 250 ALT 637 W/ HCPCS: Mod: HCNC | Performed by: INTERNAL MEDICINE

## 2024-11-07 PROCEDURE — 63600175 PHARM REV CODE 636 W HCPCS: Mod: HCNC | Performed by: INTERNAL MEDICINE

## 2024-11-07 PROCEDURE — 27000221 HC OXYGEN, UP TO 24 HOURS: Mod: HCNC

## 2024-11-07 PROCEDURE — 97161 PT EVAL LOW COMPLEX 20 MIN: CPT | Mod: HCNC

## 2024-11-07 RX ORDER — INSULIN GLARGINE 100 [IU]/ML
10 INJECTION, SOLUTION SUBCUTANEOUS DAILY
Status: DISCONTINUED | OUTPATIENT
Start: 2024-11-07 | End: 2024-11-08

## 2024-11-07 RX ORDER — GLUCAGON 1 MG
1 KIT INJECTION
Status: DISCONTINUED | OUTPATIENT
Start: 2024-11-07 | End: 2024-11-11 | Stop reason: HOSPADM

## 2024-11-07 RX ORDER — IBUPROFEN 200 MG
24 TABLET ORAL
Status: DISCONTINUED | OUTPATIENT
Start: 2024-11-07 | End: 2024-11-07

## 2024-11-07 RX ORDER — IBUPROFEN 200 MG
24 TABLET ORAL
Status: DISCONTINUED | OUTPATIENT
Start: 2024-11-07 | End: 2024-11-11 | Stop reason: HOSPADM

## 2024-11-07 RX ORDER — GLUCAGON 1 MG
1 KIT INJECTION
Status: DISCONTINUED | OUTPATIENT
Start: 2024-11-07 | End: 2024-11-07

## 2024-11-07 RX ORDER — IBUPROFEN 200 MG
16 TABLET ORAL
Status: DISCONTINUED | OUTPATIENT
Start: 2024-11-07 | End: 2024-11-07

## 2024-11-07 RX ORDER — INSULIN GLARGINE 100 [IU]/ML
10 INJECTION, SOLUTION SUBCUTANEOUS DAILY
Status: DISCONTINUED | OUTPATIENT
Start: 2024-11-07 | End: 2024-11-07

## 2024-11-07 RX ORDER — IBUPROFEN 200 MG
16 TABLET ORAL
Status: DISCONTINUED | OUTPATIENT
Start: 2024-11-07 | End: 2024-11-11 | Stop reason: HOSPADM

## 2024-11-07 RX ADMIN — SACUBITRIL AND VALSARTAN 1 TABLET: 24; 26 TABLET, FILM COATED ORAL at 09:11

## 2024-11-07 RX ADMIN — DIPHENHYDRAMINE HYDROCHLORIDE 25 MG: 25 CAPSULE ORAL at 08:11

## 2024-11-07 RX ADMIN — MYCOPHENOLATE MOFETIL 1000 MG: 250 CAPSULE ORAL at 08:11

## 2024-11-07 RX ADMIN — ASPIRIN 81 MG CHEWABLE TABLET 81 MG: 81 TABLET CHEWABLE at 09:11

## 2024-11-07 RX ADMIN — SUCRALFATE 1 G: 1 TABLET ORAL at 09:11

## 2024-11-07 RX ADMIN — INSULIN ASPART 10 UNITS: 100 INJECTION, SOLUTION INTRAVENOUS; SUBCUTANEOUS at 11:11

## 2024-11-07 RX ADMIN — LEVALBUTEROL HYDROCHLORIDE 0.63 MG: 0.63 SOLUTION RESPIRATORY (INHALATION) at 01:11

## 2024-11-07 RX ADMIN — SUCRALFATE 1 G: 1 TABLET ORAL at 03:11

## 2024-11-07 RX ADMIN — ISOSORBIDE MONONITRATE 30 MG: 30 TABLET, EXTENDED RELEASE ORAL at 09:11

## 2024-11-07 RX ADMIN — INSULIN GLARGINE 10 UNITS: 100 INJECTION, SOLUTION SUBCUTANEOUS at 01:11

## 2024-11-07 RX ADMIN — SUCRALFATE 1 G: 1 TABLET ORAL at 08:11

## 2024-11-07 RX ADMIN — BUDESONIDE 1 MG: 0.5 INHALANT RESPIRATORY (INHALATION) at 08:11

## 2024-11-07 RX ADMIN — BUDESONIDE 1 MG: 0.5 INHALANT RESPIRATORY (INHALATION) at 07:11

## 2024-11-07 RX ADMIN — TAMSULOSIN HYDROCHLORIDE 0.4 MG: 0.4 CAPSULE ORAL at 09:11

## 2024-11-07 RX ADMIN — GUAIFENESIN 400 MG: 200 SOLUTION ORAL at 08:11

## 2024-11-07 RX ADMIN — METOPROLOL SUCCINATE 50 MG: 50 TABLET, EXTENDED RELEASE ORAL at 09:11

## 2024-11-07 RX ADMIN — METHYLPREDNISOLONE SODIUM SUCCINATE 80 MG: 40 INJECTION, POWDER, FOR SOLUTION INTRAMUSCULAR; INTRAVENOUS at 09:11

## 2024-11-07 RX ADMIN — FUROSEMIDE 40 MG: 10 INJECTION, SOLUTION INTRAMUSCULAR; INTRAVENOUS at 09:11

## 2024-11-07 RX ADMIN — LEVALBUTEROL HYDROCHLORIDE 0.63 MG: 0.63 SOLUTION RESPIRATORY (INHALATION) at 07:11

## 2024-11-07 RX ADMIN — GUAIFENESIN 400 MG: 200 SOLUTION ORAL at 01:11

## 2024-11-07 RX ADMIN — SUCRALFATE 1 G: 1 TABLET ORAL at 05:11

## 2024-11-07 RX ADMIN — SACUBITRIL AND VALSARTAN 1 TABLET: 24; 26 TABLET, FILM COATED ORAL at 08:11

## 2024-11-07 RX ADMIN — BENZONATATE 100 MG: 100 CAPSULE ORAL at 08:11

## 2024-11-07 RX ADMIN — ARFORMOTEROL TARTRATE 15 MCG: 15 SOLUTION RESPIRATORY (INHALATION) at 07:11

## 2024-11-07 RX ADMIN — AMLODIPINE BESYLATE 10 MG: 5 TABLET ORAL at 09:11

## 2024-11-07 RX ADMIN — INSULIN ASPART 10 UNITS: 100 INJECTION, SOLUTION INTRAVENOUS; SUBCUTANEOUS at 04:11

## 2024-11-07 RX ADMIN — INSULIN ASPART 4 UNITS: 100 INJECTION, SOLUTION INTRAVENOUS; SUBCUTANEOUS at 08:11

## 2024-11-07 RX ADMIN — LEVALBUTEROL HYDROCHLORIDE 0.63 MG: 0.63 SOLUTION RESPIRATORY (INHALATION) at 08:11

## 2024-11-07 RX ADMIN — ARFORMOTEROL TARTRATE 15 MCG: 15 SOLUTION RESPIRATORY (INHALATION) at 08:11

## 2024-11-07 RX ADMIN — ENOXAPARIN SODIUM 40 MG: 40 INJECTION SUBCUTANEOUS at 03:11

## 2024-11-07 RX ADMIN — MYCOPHENOLATE MOFETIL 1000 MG: 250 CAPSULE ORAL at 09:11

## 2024-11-07 RX ADMIN — INSULIN ASPART 8 UNITS: 100 INJECTION, SOLUTION INTRAVENOUS; SUBCUTANEOUS at 09:11

## 2024-11-07 RX ADMIN — LEVOFLOXACIN 750 MG: 5 INJECTION, SOLUTION INTRAVENOUS at 03:11

## 2024-11-07 RX ADMIN — FAMOTIDINE 40 MG: 20 TABLET ORAL at 09:11

## 2024-11-07 RX ADMIN — ATORVASTATIN CALCIUM 40 MG: 40 TABLET, FILM COATED ORAL at 08:11

## 2024-11-07 RX ADMIN — BENZONATATE 100 MG: 100 CAPSULE ORAL at 01:11

## 2024-11-07 NOTE — HPI
Emmanuel Grant is a 73 year old male with history of ILD (on home O2), CAD, HFrEF (EF 40% with grade I DD), HTN, DMII, HLD, CVA, BPH, who presents with acute worsening of his chronic SOB.  He is having cough with white sputum production that is causing post-tussive emesis. Denies sick contacts or tobacco abuse.     He has had recurrent admissions for similar presentations.  He has a known diagnosis of idiopathic pulmonary fibrosis for which he is taking mycophenolate mofetil and is regularly taking steroid tapers.  He takes home oxygen but has noticed progressive shortness of breath over the last months.      In the ER his white blood cell count was normal, lactic acid was elevated to 5.5, and respiratory evaluation was negative.  Procalcitonin negative.  VBG showed 7.34/43/25/23.  Chest x-ray with persistent interstitial process that maybe slightly worsened.    He normally follows with Dr. Naheed Schmitt.  He has responded favorably to steroid tapers in the past, but AI workup has been largely negative.  Recently started on MMF.  There are thoughts that severe GERD is contributing to his ILD and he is establishing care with GI to address this issue.     Childhood Illnesses:  none  Occupational:   works in air conditioning in installation and repair  Environmental:   no pets, no seasonal allergies, no carpet in his home and no concern for mold or allergy exposures there  Tobacco/Smoking:   never smoker     TTE with Grade I DD and EF if 40-45%

## 2024-11-07 NOTE — SUBJECTIVE & OBJECTIVE
Interval History: Patient seen and examined. Weaned to home 2L. Satting %. Continues to report SOB.     Review of Systems   Constitutional:  Negative for chills, fatigue and fever.   HENT:  Positive for congestion.    Respiratory:  Positive for cough and shortness of breath.    Cardiovascular:  Negative for chest pain, palpitations and leg swelling.   Gastrointestinal:  Negative for abdominal pain, constipation, diarrhea, nausea and vomiting.     Objective:     Vital Signs (Most Recent):  Temp: 98.3 °F (36.8 °C) (11/07/24 1124)  Pulse: 91 (11/07/24 1124)  Resp: 18 (11/07/24 1124)  BP: 138/89 (11/07/24 1124)  SpO2: 97 % (11/07/24 1124) Vital Signs (24h Range):  Temp:  [97 °F (36.1 °C)-98.4 °F (36.9 °C)] 98.3 °F (36.8 °C)  Pulse:  [] 91  Resp:  [18-19] 18  SpO2:  [95 %-100 %] 97 %  BP: (128-140)/(75-89) 138/89     Weight: 68 kg (150 lb)  Body mass index is 22.81 kg/m².    Intake/Output Summary (Last 24 hours) at 11/7/2024 1239  Last data filed at 11/7/2024 0812  Gross per 24 hour   Intake 480 ml   Output 2090 ml   Net -1610 ml         Physical Exam  Vitals and nursing note reviewed.   HENT:      Head: Normocephalic.      Mouth/Throat:      Mouth: Mucous membranes are moist.      Pharynx: Oropharynx is clear.   Eyes:      Conjunctiva/sclera: Conjunctivae normal.   Cardiovascular:      Rate and Rhythm: Normal rate and regular rhythm.   Pulmonary:      Effort: No respiratory distress.      Breath sounds: No wheezing.      Comments: Mildly increased WOB noted. Diffuse crackles.   Abdominal:      General: Bowel sounds are normal.      Palpations: Abdomen is soft.   Neurological:      Mental Status: He is alert and oriented to person, place, and time.             Significant Labs: All pertinent labs within the past 24 hours have been reviewed.    Significant Imaging: I have reviewed all pertinent imaging results/findings within the past 24 hours.

## 2024-11-07 NOTE — HOSPITAL COURSE
Patient admitted for acute on chronic respiratory failure. Evaluated by Pulmonology who recommended continued diuresis as tolerated, Solu-Medrol, increasing CellCept dose and continous titration of O2 to maintain saturations >90%. Patient euvolemic on exam and without signs of infection throughout admission. Antibiotics continued given patient's history of ILD and immunosuppressive therapy. Received 5 day course of IV Levaquin while inpatient, will not continue treatment on discharge given minimal suspicion for infection. Patient reported gradual improvement of SOB with treatment initiated therapy recommended by Pulmonology. He is now able to ambulate to bathroom and around hospital room without worsening of symptoms. Adequate saturations on home 2L. Walk test completed before discharge. Patient saturations noted at 94% on home 2L with exercise. Will discharge on home O2, increase not needed. Will discharge with Pulmonology recommendations of steroid taper, increased CellCept dose, budesonide and alformeterol nebulizers. Will also discharge on 3 days of Potassium, given repeated hypokalemia with albuterol treatments. BMP to be rechecked within 1 week. Patient is hemodynamically stable for discharge with PCP and Pulmonology follow-up appointments.

## 2024-11-07 NOTE — PLAN OF CARE
Problem: Adult Inpatient Plan of Care  Goal: Plan of Care Review  Outcome: Progressing  Goal: Patient-Specific Goal (Individualized)  Outcome: Progressing  Goal: Absence of Hospital-Acquired Illness or Injury  Outcome: Progressing  Goal: Optimal Comfort and Wellbeing  Outcome: Progressing  Goal: Readiness for Transition of Care  Outcome: Progressing     Problem: COPD (Chronic Obstructive Pulmonary Disease)  Goal: Optimal Chronic Illness Coping  Outcome: Progressing  Goal: Optimal Level of Functional [place]  Outcome: Progressing  Goal: Absence of Infection Signs and Symptoms  Outcome: Progressing  Goal: Improved Oral Intake  Outcome: Progressing  Goal: Effective Oxygenation and Ventilation  Outcome: Progressing     Problem: Diabetes Comorbidity  Goal: Blood Glucose Level Within Targeted Range  Outcome: Progressing

## 2024-11-07 NOTE — PT/OT/SLP EVAL
Physical Therapy Evaluation and Discharge Note    Patient Name:  Emmanuel Grant   MRN:  [id number]    Recommendations:     Discharge Recommendations: Low Intensity Therapy  Discharge Equipment Recommendations: none   Barriers to discharge:  None from PT standpoint    Assessment:     Emmanuel Grant is a 73 y.o. male admitted with a medical diagnosis of Acute on chronic hypoxic respiratory failure. .  At this time, patient is functioning at their prior level of function and does not require further acute PT services.     Recent Surgery: * No surgery found *      Plan:     During this hospitalization, patient does not require further acute PT services.  Please re-consult if situation changes.      Subjective     Chief Complaint: SOB, cold in throat  Patient/Family Comments/goals: Pt agreeable to evaluation and to remain sitting up in chair following  Pain/Comfort:  Pain Rating 1: 0/10    Patients cultural, spiritual, Rastafarian conflicts given the current situation: no    Living Environment:  Pt lives alone in a [facility] with 5 MG, B HR's  Prior to admission, patients level of function was Independent.  Equipment used at home: oxygen.  DME owned (not currently used): shower chair  Upon discharge, patient l have assistance from ?.    Objective:     Communicated with nsg prior to session.  Patient found HOB elevated with  (2 L O2 via NC not in place) upon PT entry to room.    General Precautions: Standard, fall, respiratory    Orthopedic Precautions:N/A   Braces: N/A  Respiratory Status: Room air 2 L O2 via NC not in place    Exams:  Cognitive Exam:  Patient is oriented to Person, Place, Time, and Situation  Gross Motor Coordination:  WFL  Postural Exam:  Patient presented with the following abnormalities:    -       Rounded shoulders  -       Forward head  Skin Integrity/Edema:      -       Skin integrity: Visible skin intact  -       Edema: None noted   RLE ROM: WFL  RLE Strength: WFL  LLE ROM: WFL  LLE Strength:  WFL    Functional Mobility:  Bed Mobility:     Scooting: modified independence  Supine to Sit: modified independence  Transfers:     Sit to Stand:  modified independence with no AD  Gait: Sup/Mod I approx 50'  Balance: Good-    AM-PAC 6 CLICK MOBILITY  Total Score:23       Treatment and Education:  SPO2 97% on RA at rest.  SPO2 92% on @L O2 with ambulation.  Educated pt on Pursed lip breathing and AP's    AM-PAC 6 CLICK MOBILITY  Total Score:23     Patient left up in chair with all lines intact, call button in reach, and nsg notified.    GOALS:   Multidisciplinary Problems       Physical Therapy Goals          Problem: Physical Therapy    Goal Priority Disciplines Outcome Interventions   Physical Therapy Goal     PT, PT/OT Adequate for Care Transition                        History:     Past Medical History:   Diagnosis Date    CAD (coronary artery disease)     Sees [facility], h/o stent    Diabetes mellitus     Hypertension     Kidney stone     Stroke     age 62       Past Surgical History:   Procedure Laterality Date    CARDIAC CATHETERIZATION      with stent    COLONOSCOPY N/A 03/27/2024    Procedure: COLONOSCOPY;  Surgeon: Ariela Castro MD;  Location: [facility] ENDO;  Service: Endoscopy;  Laterality: N/A;    ESOPHAGOGASTRODUODENOSCOPY N/A 03/27/2024    Procedure: EGD (ESOPHAGOGASTRODUODENOSCOPY);  Surgeon: Ariela Castro MD;  Location: [facility] ENDO;  Service: Endoscopy;  Laterality: N/A;    LEFT HEART CATHETERIZATION Left 9/10/2024    Procedure: Left heart cath;  Surgeon: Jemal Drummond MD;  Location: [facility] CATH LAB;  Service: Cardiology;  Laterality: Left;    SHOULDER SURGERY Right        Time Tracking:     PT Received On: 11/07/24  PT Start Time: 1327     PT Stop Time: 1335  PT Total Time (min): 8 min     Billable Minutes: Evaluation 8      11/07/2024

## 2024-11-07 NOTE — ASSESSMENT & PLAN NOTE
Patient likely with decompensation of his interstitial lung disease.  Hopefully this does not reflect a true ILD exacerbation as a mortality with this issue is quite high.  He has previously responded to increased doses of steroids.  He presented taking 500 mg b.i.d. of CellCept.  No evidence of fluid overload on his lower extremities.  - recommend diuresis as tolerated   - continue 1 milligram/kilogram Solu-Medrol q.day   - increase CellCept to 1000 mg b.i.d.  monitor closely for GI symptoms  - increase oxygen to targeted saturations greater than 90%.    - PTOT   - He will need follow up with Pulmonary after discharge.  - continue budesonide and arformoterol nebulizer therapy.  continue p.r.n. Xopenex

## 2024-11-07 NOTE — ASSESSMENT & PLAN NOTE
Patient's FSGs are controlled on current medication regimen.  Last A1c reviewed-   Lab Results   Component Value Date    HGBA1C 8.0 (H) 09/25/2024     Most recent fingerstick glucose reviewed-   Recent Labs   Lab 11/06/24  1953 11/07/24  0746 11/07/24  1124 11/07/24  1224   POCTGLUCOSE 374* 323* 485* 364*       Current correctional scale  Low  Maintain anti-hyperglycemic dose as follows-   Antihyperglycemics (From admission, onward)    Start     Stop Route Frequency Ordered    11/07/24 1245  insulin glargine U-100 (Lantus) pen 10 Units         -- SubQ Daily 11/07/24 1244    11/06/24 1529  insulin aspart U-100 pen 0-10 Units         -- SubQ Before meals & nightly PRN 11/06/24 1430        Hold Oral hypoglycemics while patient is in the hospital. She takes Amaryl 2mg PO BID and Metformin 1gm PO BID

## 2024-11-07 NOTE — CARE UPDATE
Per nurse, Patient requesting Tylenol PM for sleep because Melatonin does not work     -Started Benadryl 25mg HS, now

## 2024-11-07 NOTE — CONSULTS
[facility] Surg  Pulmonology  Consult Note    Patient Name: Emmanuel Grant  MRN: [id number]  Admission Date: 11/5/2024  Hospital Length of Stay: 0 days  Code Status: Full Code  Attending Physician: Kristian Han MD  Primary Care Provider: Wilfredo De Souza MD   Principal Problem: Acute on chronic hypoxic respiratory failure    Inpatient consult to Pulmonology  Consult performed by: Darian Pérez MD  Consult ordered by: Ivette Reid DO        Subjective:     HPI:  Emmanuel Grant is a 73 year old male with history of ILD (on home O2), CAD, HFrEF (EF 40% with grade I DD), HTN, DMII, HLD, CVA, BPH, who presents with acute worsening of his chronic SOB.  He is having cough with white sputum production that is causing post-tussive emesis. Denies sick contacts or tobacco abuse.     He has had recurrent admissions for similar presentations.  He has a known diagnosis of idiopathic pulmonary fibrosis for which he is taking mycophenolate mofetil and is regularly taking steroid tapers.  He takes home oxygen but has noticed progressive shortness of breath over the last months.      In the ER his white blood cell count was normal, lactic acid was elevated to 5.5, and respiratory evaluation was negative.  Procalcitonin negative.  VBG showed 7.34/43/25/23.  Chest x-ray with persistent interstitial process that maybe slightly worsened.    He normally follows with Dr. Naheed Schmitt.  He has responded favorably to steroid tapers in the past, but AI workup has been largely negative.  Recently started on MMF.  There are thoughts that severe GERD is contributing to his ILD and he is establishing care with GI to address this issue.     Childhood Illnesses:  none  Occupational:   works in air conditioning in installation and repair  Environmental:   no pets, no seasonal allergies, no carpet in his home and no concern for mold or allergy exposures there  Tobacco/Smoking:   never smoker     TTE with Grade I DD and EF if 40-45%    Past  "Medical History:   Diagnosis Date    CAD (coronary artery disease)     Sees [facility], h/o stent    Diabetes mellitus     Hypertension     Kidney stone     Stroke     age 62       Past Surgical History:   Procedure Laterality Date    CARDIAC CATHETERIZATION      with stent    COLONOSCOPY N/A 03/27/2024    Procedure: COLONOSCOPY;  Surgeon: Ariela Castro MD;  Location: [facility] ENDO;  Service: Endoscopy;  Laterality: N/A;    ESOPHAGOGASTRODUODENOSCOPY N/A 03/27/2024    Procedure: EGD (ESOPHAGOGASTRODUODENOSCOPY);  Surgeon: Ariela Castro MD;  Location: [facility] ENDO;  Service: Endoscopy;  Laterality: N/A;    LEFT HEART CATHETERIZATION Left 9/10/2024    Procedure: Left heart cath;  Surgeon: Jemal Drummond MD;  Location: [facility] CATH LAB;  Service: Cardiology;  Laterality: Left;    SHOULDER SURGERY Right        Review of patient's allergies indicates:   Allergen Reactions    Dapagliflozin      Other reaction(s): Other (See Comments)    Pcn [penicillins]     Linagliptin Other (See Comments)     "it knocked me down", "it almost killed me"    Lisinopril Other (See Comments)     cough    Pantoprazole Hives       Family History       Problem Relation (Age of Onset)    Cancer Mother    Colon cancer Sister          Tobacco Use    Smoking status: Never     Passive exposure: Never    Smokeless tobacco: Never   Substance and Sexual Activity    Alcohol use: No    Drug use: Never    Sexual activity: Not Currently         Review of Systems   Constitutional:  Positive for activity change and fatigue. Negative for fever.   Respiratory:  Positive for cough and shortness of breath.      Objective:     Vital Signs (Most Recent):  Temp: 98.4 °F (36.9 °C) (11/06/24 1613)  Pulse: 88 (11/06/24 1613)  Resp: 19 (11/06/24 1613)  BP: 128/78 (11/06/24 1613)  SpO2: 98 % (11/06/24 1613) Vital Signs (24h Range):  Temp:  [97.7 °F (36.5 °C)-98.5 °F (36.9 °C)] 98.4 °F (36.9 °C)  Pulse:  [] 88  Resp:  [18-28] 19  SpO2:  [97 %-100 %] 98 " %  BP: (116-150)/(78-92) 128/78     Weight: 68 kg (150 lb)  Body mass index is 22.81 kg/m².      Intake/Output Summary (Last 24 hours) at 11/6/2024 1805  Last data filed at 11/6/2024 1334  Gross per 24 hour   Intake 360 ml   Output 1300 ml   Net -940 ml        Physical Exam  Constitutional:       General: He is not in acute distress.     Appearance: Normal appearance. He is normal weight. He is ill-appearing. He is not toxic-appearing.   HENT:      Head: Normocephalic and atraumatic.      Nose: Nose normal.      Mouth/Throat:      Mouth: Mucous membranes are moist.   Eyes:      Extraocular Movements: Extraocular movements intact.      Pupils: Pupils are equal, round, and reactive to light.   Cardiovascular:      Rate and Rhythm: Normal rate and regular rhythm.      Heart sounds: No murmur heard.     No friction rub. No gallop.   Pulmonary:      Comments: Diffuse crackles auscultated bilaterally.  Abdominal:      General: Abdomen is flat. There is no distension.      Palpations: Abdomen is soft.      Tenderness: There is no abdominal tenderness.   Musculoskeletal:         General: Normal range of motion.      Cervical back: Normal range of motion.      Right lower leg: No edema.      Left lower leg: No edema.   Skin:     General: Skin is warm.      Capillary Refill: Capillary refill takes less than 2 seconds.   Neurological:      General: No focal deficit present.      Mental Status: He is alert and oriented to person, place, and time. Mental status is at baseline.   Psychiatric:         Mood and Affect: Mood normal.         Behavior: Behavior normal.         Thought Content: Thought content normal.         Judgment: Judgment normal.          Vents:  Oxygen Concentration (%): 32 (11/06/24 1409)    Lines/Drains/Airways       Peripheral Intravenous Line  Duration                  Peripheral IV - Single Lumen 11/05/24 1313 20 G Anterior;Left Forearm 1 day                    Significant Labs:    CBC/Anemia  Profile:  Recent Labs   Lab 11/05/24  2229 11/06/24  0608   WBC 5.51 9.38   HGB 13.8* 14.1   HCT 40.7 40.8    198   MCV 84 83   RDW 13.9 13.9        Chemistries:  Recent Labs   Lab 11/06/24  0608      K 3.6      CO2 23   BUN 15   CREATININE 0.9   CALCIUM 9.5   MG 1.7   PHOS 2.8       All pertinent labs within the past 24 hours have been reviewed.    Significant Imaging:   I have reviewed all pertinent imaging results/findings within the past 24 hours.    ABG  Recent Labs   Lab 11/05/24  1856   PH 7.348*   PO2 25*   PCO2 43.4   HCO3 23.8*   BE -2     Assessment/Plan:     Pulmonary  * Acute on chronic hypoxic respiratory failure  See section on interstitial lung disease    Interstitial lung disease exacerbation  Patient likely with decompensation of his interstitial lung disease.  Hopefully this does not reflect a true ILD exacerbation as a mortality with this issue is quite high.  He has previously responded to increased doses of steroids.  He is currently taking 500 mg b.i.d. of CellCept.  No evidence of fluid overload on his lower extremities.  - recommend diuresis as tolerated   - recommend 1 milligram/kilogram Solu-Medrol q.day   - increase CellCept to 1000 mg b.i.d.  monitor closely for GI symptoms  - increase oxygen to targeted saturations greater than 90%.    - PTOT   - we will need follow up with Pulmonary after discharge.  - continue budesonide and arformoterol nebulizer therapy.  continue p.r.n. Xopenex      Thank you for your consult. I will follow-up with patient. Please contact us if you have any additional questions.     Darian Pérez MD  Pulmonology  [facility] [place] - Med Surg

## 2024-11-07 NOTE — SUBJECTIVE & OBJECTIVE
"Past Medical History:   Diagnosis Date    CAD (coronary artery disease)     Sees [facility], h/o stent    Diabetes mellitus     Hypertension     Kidney stone     Stroke     age 62       Past Surgical History:   Procedure Laterality Date    CARDIAC CATHETERIZATION      with stent    COLONOSCOPY N/A 03/27/2024    Procedure: COLONOSCOPY;  Surgeon: Ariela Castro MD;  Location: [facility] ENDO;  Service: Endoscopy;  Laterality: N/A;    ESOPHAGOGASTRODUODENOSCOPY N/A 03/27/2024    Procedure: EGD (ESOPHAGOGASTRODUODENOSCOPY);  Surgeon: Ariela Castro MD;  Location: [facility] ENDO;  Service: Endoscopy;  Laterality: N/A;    LEFT HEART CATHETERIZATION Left 9/10/2024    Procedure: Left heart cath;  Surgeon: Jemal Drummond MD;  Location: [facility] CATH LAB;  Service: Cardiology;  Laterality: Left;    SHOULDER SURGERY Right        Review of patient's allergies indicates:   Allergen Reactions    Dapagliflozin      Other reaction(s): Other (See Comments)    Pcn [penicillins]     Linagliptin Other (See Comments)     "it knocked me down", "it almost killed me"    Lisinopril Other (See Comments)     cough    Pantoprazole Hives       Family History       Problem Relation (Age of Onset)    Cancer Mother    Colon cancer Sister          Tobacco Use    Smoking status: Never     Passive exposure: Never    Smokeless tobacco: Never   Substance and Sexual Activity    Alcohol use: No    Drug use: Never    Sexual activity: Not Currently         Review of Systems   Constitutional:  Positive for activity change and fatigue. Negative for fever.   Respiratory:  Positive for cough and shortness of breath.      Objective:     Vital Signs (Most Recent):  Temp: 98.4 °F (36.9 °C) (11/06/24 1613)  Pulse: 88 (11/06/24 1613)  Resp: 19 (11/06/24 1613)  BP: 128/78 (11/06/24 1613)  SpO2: 98 % (11/06/24 1613) Vital Signs (24h Range):  Temp:  [97.7 °F (36.5 °C)-98.5 °F (36.9 °C)] 98.4 °F (36.9 °C)  Pulse:  [] 88  Resp:  [18-28] 19  SpO2:  [97 %-100 %] 98 " %  BP: (116-150)/(78-92) 128/78     Weight: 68 kg (150 lb)  Body mass index is 22.81 kg/m².      Intake/Output Summary (Last 24 hours) at 11/6/2024 1805  Last data filed at 11/6/2024 1334  Gross per 24 hour   Intake 360 ml   Output 1300 ml   Net -940 ml        Physical Exam  Constitutional:       General: He is not in acute distress.     Appearance: Normal appearance. He is normal weight. He is ill-appearing. He is not toxic-appearing.   HENT:      Head: Normocephalic and atraumatic.      Nose: Nose normal.      Mouth/Throat:      Mouth: Mucous membranes are moist.   Eyes:      Extraocular Movements: Extraocular movements intact.      Pupils: Pupils are equal, round, and reactive to light.   Cardiovascular:      Rate and Rhythm: Normal rate and regular rhythm.      Heart sounds: No murmur heard.     No friction rub. No gallop.   Pulmonary:      Comments: Diffuse crackles auscultated bilaterally.  Abdominal:      General: Abdomen is flat. There is no distension.      Palpations: Abdomen is soft.      Tenderness: There is no abdominal tenderness.   Musculoskeletal:         General: Normal range of motion.      Cervical back: Normal range of motion.      Right lower leg: No edema.      Left lower leg: No edema.   Skin:     General: Skin is warm.      Capillary Refill: Capillary refill takes less than 2 seconds.   Neurological:      General: No focal deficit present.      Mental Status: He is alert and oriented to person, place, and time. Mental status is at baseline.   Psychiatric:         Mood and Affect: Mood normal.         Behavior: Behavior normal.         Thought Content: Thought content normal.         Judgment: Judgment normal.          Vents:  Oxygen Concentration (%): 32 (11/06/24 1409)    Lines/Drains/Airways       Peripheral Intravenous Line  Duration                  Peripheral IV - Single Lumen 11/05/24 1313 20 G Anterior;Left Forearm 1 day                    Significant Labs:    CBC/Anemia  Profile:  Recent Labs   Lab 11/05/24  2229 11/06/24  0608   WBC 5.51 9.38   HGB 13.8* 14.1   HCT 40.7 40.8    198   MCV 84 83   RDW 13.9 13.9        Chemistries:  Recent Labs   Lab 11/06/24  0608      K 3.6      CO2 23   BUN 15   CREATININE 0.9   CALCIUM 9.5   MG 1.7   PHOS 2.8       All pertinent labs within the past 24 hours have been reviewed.    Significant Imaging:   I have reviewed all pertinent imaging results/findings within the past 24 hours.

## 2024-11-07 NOTE — ASSESSMENT & PLAN NOTE
Patient likely with decompensation of his interstitial lung disease.  Hopefully this does not reflect a true ILD exacerbation as a mortality with this issue is quite high.  He has previously responded to increased doses of steroids.  He is currently taking 500 mg b.i.d. of CellCept.  No evidence of fluid overload on his lower extremities.  - recommend diuresis as tolerated   - recommend 1 milligram/kilogram Solu-Medrol q.day   - increase CellCept to 1000 mg b.i.d.  monitor closely for GI symptoms  - increase oxygen to targeted saturations greater than 90%.    - PTOT   - we will need follow up with Pulmonary after discharge.  - continue budesonide and arformoterol nebulizer therapy.  continue p.r.n. Xopenex

## 2024-11-07 NOTE — SUBJECTIVE & OBJECTIVE
Interval History:  Patient states he feels a little better today, but he seems to be unchanged to my observation. He is very concerned about his ability to recover from his illness.  He was able to walk to the bathroom, which is slightly improved.  We will continue steroid administration.     Review of Systems   Constitutional:  Positive for activity change and fatigue. Negative for fever.   Respiratory:  Positive for cough and shortness of breath.      Objective:     Vital Signs (Most Recent):  Temp: 98.3 °F (36.8 °C) (11/07/24 1124)  Pulse: 82 (11/07/24 1452)  Resp: 20 (11/07/24 1340)  BP: 138/89 (11/07/24 1124)  SpO2: 97 % (11/07/24 1300) Vital Signs (24h Range):  Temp:  [97 °F (36.1 °C)-98.4 °F (36.9 °C)] 98.3 °F (36.8 °C)  Pulse:  [] 82  Resp:  [18-20] 20  SpO2:  [95 %-100 %] 97 %  BP: (128-140)/(75-89) 138/89     Weight: 68 kg (150 lb)  Body mass index is 22.81 kg/m².      Intake/Output Summary (Last 24 hours) at 11/7/2024 1531  Last data filed at 11/7/2024 1228  Gross per 24 hour   Intake 480 ml   Output 2090 ml   Net -1610 ml        Physical Exam  Constitutional:       General: He is not in acute distress.     Appearance: Normal appearance. He is normal weight. He is ill-appearing. He is not toxic-appearing.   HENT:      Head: Normocephalic and atraumatic.      Nose: Nose normal.      Mouth/Throat:      Mouth: Mucous membranes are moist.   Eyes:      Extraocular Movements: Extraocular movements intact.      Pupils: Pupils are equal, round, and reactive to light.   Cardiovascular:      Rate and Rhythm: Normal rate and regular rhythm.      Heart sounds: No murmur heard.     No friction rub. No gallop.   Pulmonary:      Comments: Diffuse crackles auscultated bilaterally.  Abdominal:      General: Abdomen is flat. There is no distension.      Palpations: Abdomen is soft.      Tenderness: There is no abdominal tenderness.   Musculoskeletal:         General: Normal range of motion.      Cervical back: Normal  range of motion.      Right lower leg: No edema.      Left lower leg: No edema.   Skin:     General: Skin is warm.      Capillary Refill: Capillary refill takes less than 2 seconds.   Neurological:      General: No focal deficit present.      Mental Status: He is alert and oriented to person, place, and time. Mental status is at baseline.   Psychiatric:         Mood and Affect: Mood normal.         Behavior: Behavior normal.         Thought Content: Thought content normal.         Judgment: Judgment normal.          Vents:  Oxygen Concentration (%): 32 (11/06/24 1409)    Lines/Drains/Airways       Peripheral Intravenous Line  Duration                  Peripheral IV - Single Lumen 11/07/24 0942 18 G Anterior;Distal;Left Antecubital <1 day                    Significant Labs:    CBC/Anemia Profile:  Recent Labs   Lab 11/05/24  2229 11/06/24  0608   WBC 5.51 9.38   HGB 13.8* 14.1   HCT 40.7 40.8    198   MCV 84 83   RDW 13.9 13.9        Chemistries:  Recent Labs   Lab 11/06/24  0608      K 3.6      CO2 23   BUN 15   CREATININE 0.9   CALCIUM 9.5   MG 1.7   PHOS 2.8       All pertinent labs within the past 24 hours have been reviewed.    Significant Imaging:   I have reviewed all pertinent imaging results/findings within the past 24 hours.

## 2024-11-07 NOTE — PROGRESS NOTES
[facility] Medicine  Progress Note    Patient Name: Emmanuel Grant  MRN: [id number]  Patient Class: IP- Inpatient   Admission Date: 11/5/2024  Length of Stay: 1 days  Attending Physician: Kristian Han MD  Primary Care Provider: Wilfredo De Souza MD        Subjective:     Principal Problem:Acute on chronic hypoxic respiratory failure        HPI:  73 y.o. man with h/o NIDDM type 2 (A1c 8 in September 2024), CAD (with h/o stent), Essential HTN, h/o CVA, ILD complicated by chronic hypoxic respiratory failure (on 2L NC at home) and recently treated with MMF 500mg PO BID (started 9/2024) presents to the ER with worsening SOB and cough. COVID/Flu negative. Finished a round of Azithromycin. NO c/o Fevers or chills. NO N/V/Diarrhea. No chest pain. States can't stop sneezing or coughing.  [place] ED noted Sats upper 80s on his home 2L NC. D/w pulmonary on call Dr. Pérez who recommended transfer to [facility] and to start steroids, Levaquin 750mg IV qday and scheduled/PRN Duonebs.   Labs with normal WBC and stable H/H. Lytes unremarkable and renal function stable.  Glucose 147. VBG 7.3. D-Dimer normal and BNP normal Troponin negative x1 with NO STEMI on EKG.     CXR with:   Monitoring EKG leads are present. The trachea is unremarkable. The cardiomediastinal silhouette is stable. There is no evidence of free air beneath the hemidiaphragms. There are no pleural effusions. There is no evidence of a pneumothorax. There is no evidence of pneumomediastinum. There is increase in the appearance of diffuse bilateral airspace opacities. There are degenerative changes in the osseous structures.             Overview/Hospital Course:  Patient admitted for acute on chronic respiratory failure. Evaluated by Pulmonology who recommended continued diuresis as tolerated, Solu-Medrol, increasing CellCept dose and continous titration of O2 to maintain saturations >90%. Lungs mostly CTA with mild crackles noted to lower lobes on  repeat exam, however, patient continued to report SOB.     Interval History: Patient seen and examined. Weaned to home 2L. Satting %. Continues to report SOB.     Review of Systems   Constitutional:  Negative for chills, fatigue and fever.   HENT:  Positive for congestion.    Respiratory:  Positive for cough and shortness of breath.    Cardiovascular:  Negative for chest pain, palpitations and leg swelling.   Gastrointestinal:  Negative for abdominal pain, constipation, diarrhea, nausea and vomiting.     Objective:     Vital Signs (Most Recent):  Temp: 98.3 °F (36.8 °C) (11/07/24 1124)  Pulse: 91 (11/07/24 1124)  Resp: 18 (11/07/24 1124)  BP: 138/89 (11/07/24 1124)  SpO2: 97 % (11/07/24 1124) Vital Signs (24h Range):  Temp:  [97 °F (36.1 °C)-98.4 °F (36.9 °C)] 98.3 °F (36.8 °C)  Pulse:  [] 91  Resp:  [18-19] 18  SpO2:  [95 %-100 %] 97 %  BP: (128-140)/(75-89) 138/89     Weight: 68 kg (150 lb)  Body mass index is 22.81 kg/m².    Intake/Output Summary (Last 24 hours) at 11/7/2024 1239  Last data filed at 11/7/2024 0812  Gross per 24 hour   Intake 480 ml   Output 2090 ml   Net -1610 ml         Physical Exam  Vitals and nursing note reviewed.   HENT:      Head: Normocephalic.      Mouth/Throat:      Mouth: Mucous membranes are moist.      Pharynx: Oropharynx is clear.   Eyes:      Conjunctiva/sclera: Conjunctivae normal.   Cardiovascular:      Rate and Rhythm: Normal rate and regular rhythm.   Pulmonary:      Effort: No respiratory distress.      Breath sounds: No wheezing.      Comments: Mildly increased WOB noted. Diffuse crackles.   Abdominal:      General: Bowel sounds are normal.      Palpations: Abdomen is soft.   Neurological:      Mental Status: He is alert and oriented to person, place, and time.             Significant Labs: All pertinent labs within the past 24 hours have been reviewed.    Significant Imaging: I have reviewed all pertinent imaging results/findings within the past 24  hours.    Assessment/Plan:      * Acute on chronic hypoxic respiratory failure  Patient with Hypercapnic and Hypoxic Respiratory failure which is Acute on chronic.  he is on home oxygen at 2 LPM. Supplemental oxygen was provided and noted- Oxygen Concentration (%):  [50] 50    .   Signs/symptoms of respiratory failure include- increased work of breathing and cough . Contributing diagnoses includes - Interstitial lung disease Labs and images were reviewed.     F/u with pulmonary consult.     Essential hypertension  Patients blood pressure range in the last 24 hours was: BP  Min: 116/83  Max: 150/89.The patient's inpatient anti-hypertensive regimen is listed below:  Current Antihypertensives  hydrALAZINE injection 5 mg, Every 6 hours PRN, Intravenous  furosemide injection 40 mg, Daily, Intravenous  amLODIPine tablet 10 mg, Daily, Oral  isosorbide mononitrate 24 hr tablet 30 mg, Daily, Oral  metoprolol succinate (TOPROL-XL) 24 hr tablet 50 mg, Daily, Oral  nitroGLYCERIN SL tablet 0.4 mg, Every 5 min PRN, Sublingual    Plan  - BP is controlled, no changes needed to their regimen  - Diet adjusted    Coronary artery disease involving native coronary artery of native heart with angina pectoris  Patient with known CAD s/p stent placement, which is controlled Will continue ASA and Statin and monitor for S/Sx of angina/ACS. Continue to monitor on telemetry. Troponin negative x1 and will f/u on repeat troponin levels. Monitor on Tele. NO c/o chest pain at this time.     Interstitial lung disease exacerbation  F/u with pulmonary consult. Cont. Steroids, IV lasix, IV abx and nebs per pulmonary recs. Cont. MMF 500mg PO BID for now.     Results for orders placed during the hospital encounter of 10/17/24    Echo    Interpretation Summary    Left Ventricle: Regional wall motion abnormalities present. There is normal systolic function with a visually estimated ejection fraction of 55 %. Grade I diastolic dysfunction. federico septal  hypokinesis    Right Ventricle: Normal right ventricular cavity size. Systolic function is normal.    Left Atrium: Left atrium is mildly dilated.    IVC/SVC: Normal venous pressure at 3 mmHg.    Pulmonology consulted:   Continue diuresis as tolerated  Solu-Medrol q.day  CellCept 1000mg BID (monitor closely for GI symptoms)  Titrate O2 to maintain saturations >90%  Continue budesonide, arformoterol nebulizer  Xopenex prn    Type 2 diabetes mellitus without complication, without long-term current use of insulin  Patient's FSGs are controlled on current medication regimen.  Last A1c reviewed-   Lab Results   Component Value Date    HGBA1C 8.0 (H) 09/25/2024     Most recent fingerstick glucose reviewed-   Recent Labs   Lab 11/06/24  1953 11/07/24  0746 11/07/24  1124 11/07/24  1224   POCTGLUCOSE 374* 323* 485* 364*       Current correctional scale  Low  Maintain anti-hyperglycemic dose as follows-   Antihyperglycemics (From admission, onward)      Start     Stop Route Frequency Ordered    11/07/24 1245  insulin glargine U-100 (Lantus) pen 10 Units         -- SubQ Daily 11/07/24 1244    11/06/24 1529  insulin aspart U-100 pen 0-10 Units         -- SubQ Before meals & nightly PRN 11/06/24 1430          Hold Oral hypoglycemics while patient is in the hospital. She takes Amaryl 2mg PO BID and Metformin 1gm PO BID       VTE Risk Mitigation (From admission, onward)           Ordered     enoxaparin injection 40 mg  Daily         11/05/24 1726     IP VTE HIGH RISK PATIENT  Once         11/05/24 1726     Place sequential compression device  Until discontinued         11/05/24 1726                    Discharge Planning   DERIC:      Code Status: Full Code   Is the patient medically ready for discharge?:     Reason for patient still in hospital (select all that apply): Treatment and Consult recommendations                     Umang Miller PA-C  Department of Hospital Medicine   [facility]

## 2024-11-07 NOTE — PROGRESS NOTES
[facility]  Pulmonology  Progress Note    Patient Name: Emmanuel Grant  MRN: [id number]  Admission Date: 11/5/2024  Hospital Length of Stay: 1 days  Code Status: Full Code  Attending Provider: Kristian Han MD  Primary Care Provider: Wilfredo De Souza MD   Principal Problem: Acute on chronic hypoxic respiratory failure    Subjective:     Interval History:  Patient states he feels a little better today, but he seems to be unchanged to my observation. He is very concerned about his ability to recover from his illness.  He was able to walk to the bathroom, which is slightly improved.  We will continue steroid administration.     Review of Systems   Constitutional:  Positive for activity change and fatigue. Negative for fever.   Respiratory:  Positive for cough and shortness of breath.      Objective:     Vital Signs (Most Recent):  Temp: 98.3 °F (36.8 °C) (11/07/24 1124)  Pulse: 82 (11/07/24 1452)  Resp: 20 (11/07/24 1340)  BP: 138/89 (11/07/24 1124)  SpO2: 97 % (11/07/24 1300) Vital Signs (24h Range):  Temp:  [97 °F (36.1 °C)-98.4 °F (36.9 °C)] 98.3 °F (36.8 °C)  Pulse:  [] 82  Resp:  [18-20] 20  SpO2:  [95 %-100 %] 97 %  BP: (128-140)/(75-89) 138/89     Weight: 68 kg (150 lb)  Body mass index is 22.81 kg/m².      Intake/Output Summary (Last 24 hours) at 11/7/2024 1531  Last data filed at 11/7/2024 1228  Gross per 24 hour   Intake 480 ml   Output 2090 ml   Net -1610 ml        Physical Exam  Constitutional:       General: He is not in acute distress.     Appearance: Normal appearance. He is normal weight. He is ill-appearing. He is not toxic-appearing.   HENT:      Head: Normocephalic and atraumatic.      Nose: Nose normal.      Mouth/Throat:      Mouth: Mucous membranes are moist.   Eyes:      Extraocular Movements: Extraocular movements intact.      Pupils: Pupils are equal, round, and reactive to light.   Cardiovascular:      Rate and Rhythm: Normal rate and regular rhythm.      Heart sounds: No murmur  heard.     No friction rub. No gallop.   Pulmonary:      Comments: Diffuse crackles auscultated bilaterally.  Abdominal:      General: Abdomen is flat. There is no distension.      Palpations: Abdomen is soft.      Tenderness: There is no abdominal tenderness.   Musculoskeletal:         General: Normal range of motion.      Cervical back: Normal range of motion.      Right lower leg: No edema.      Left lower leg: No edema.   Skin:     General: Skin is warm.      Capillary Refill: Capillary refill takes less than 2 seconds.   Neurological:      General: No focal deficit present.      Mental Status: He is alert and oriented to person, place, and time. Mental status is at baseline.   Psychiatric:         Mood and Affect: Mood normal.         Behavior: Behavior normal.         Thought Content: Thought content normal.         Judgment: Judgment normal.          Vents:  Oxygen Concentration (%): 32 (11/06/24 1409)    Lines/Drains/Airways       Peripheral Intravenous Line  Duration                  Peripheral IV - Single Lumen 11/07/24 0942 18 G Anterior;Distal;Left Antecubital <1 day                    Significant Labs:    CBC/Anemia Profile:  Recent Labs   Lab 11/05/24  2229 11/06/24  0608   WBC 5.51 9.38   HGB 13.8* 14.1   HCT 40.7 40.8    198   MCV 84 83   RDW 13.9 13.9        Chemistries:  Recent Labs   Lab 11/06/24  0608      K 3.6      CO2 23   BUN 15   CREATININE 0.9   CALCIUM 9.5   MG 1.7   PHOS 2.8       All pertinent labs within the past 24 hours have been reviewed.    Significant Imaging:   I have reviewed all pertinent imaging results/findings within the past 24 hours.    ABG  Recent Labs   Lab 11/05/24  1856   PH 7.348*   PO2 25*   PCO2 43.4   HCO3 23.8*   BE -2     Assessment/Plan:     Pulmonary  Interstitial lung disease exacerbation  Patient likely with decompensation of his interstitial lung disease.  Hopefully this does not reflect a true ILD exacerbation as a mortality with this  issue is quite high.  He has previously responded to increased doses of steroids.  He presented taking 500 mg b.i.d. of CellCept.  No evidence of fluid overload on his lower extremities.  - recommend diuresis as tolerated   - continue 1 milligram/kilogram Solu-Medrol q.day   - increase CellCept to 1000 mg b.i.d.  monitor closely for GI symptoms  - increase oxygen to targeted saturations greater than 90%.    - PTOT   - He will need follow up with Pulmonary after discharge.  - continue budesonide and arformoterol nebulizer therapy.  continue p.r.n. Xopenex      I will continue to follow along.     Darian Pérez MD  Pulmonology  [facility] [place] [facility]

## 2024-11-07 NOTE — PLAN OF CARE
Problem: Physical Therapy  Goal: Physical Therapy Goal  Outcome: Adequate for Care Transition   Low Intensity Therapy recommended at time of D/C.  PT to sign off.  OK for OOB to chair and ambulate with nursing staff.

## 2024-11-07 NOTE — ASSESSMENT & PLAN NOTE
F/u with pulmonary consult. Cont. Steroids, IV lasix, IV abx and nebs per pulmonary recs. Cont. MMF 500mg PO BID for now.     Results for orders placed during the hospital encounter of 10/17/24    Echo    Interpretation Summary    Left Ventricle: Regional wall motion abnormalities present. There is normal systolic function with a visually estimated ejection fraction of 55 %. Grade I diastolic dysfunction. federico septal hypokinesis    Right Ventricle: Normal right ventricular cavity size. Systolic function is normal.    Left Atrium: Left atrium is mildly dilated.    IVC/SVC: Normal venous pressure at 3 mmHg.    Pulmonology consulted:   Continue diuresis as tolerated  Solu-Medrol q.day  CellCept 1000mg BID (monitor closely for GI symptoms)  Titrate O2 to maintain saturations >90%  Continue budesonide, arformoterol nebulizer  Xopenex prn

## 2024-11-07 NOTE — PLAN OF CARE
Problem: Adult Inpatient Plan of Care  Goal: Plan of Care Review  Outcome: Progressing  Goal: Patient-Specific Goal (Individualized)  Outcome: Progressing     Problem: COPD (Chronic Obstructive Pulmonary Disease)  Goal: Optimal Chronic Illness Coping  Outcome: Progressing     Problem: Diabetes Comorbidity  Goal: Blood Glucose Level Within Targeted Range  Outcome: Progressing

## 2024-11-08 ENCOUNTER — TELEPHONE (OUTPATIENT)
Dept: PULMONOLOGY | Facility: CLINIC | Age: 74
End: 2024-11-08
Payer: MEDICARE

## 2024-11-08 PROBLEM — E87.6 HYPOKALEMIA: Status: ACTIVE | Noted: 2024-11-08

## 2024-11-08 LAB
ALBUMIN SERPL BCP-MCNC: 3.2 G/DL (ref 3.5–5.2)
ALP SERPL-CCNC: 55 U/L (ref 40–150)
ALT SERPL W/O P-5'-P-CCNC: 15 U/L (ref 10–44)
ANION GAP SERPL CALC-SCNC: 12 MMOL/L (ref 8–16)
ANION GAP SERPL CALC-SCNC: 15 MMOL/L (ref 8–16)
AST SERPL-CCNC: 11 U/L (ref 10–40)
BASOPHILS # BLD AUTO: 0 K/UL (ref 0–0.2)
BASOPHILS NFR BLD: 0 % (ref 0–1.9)
BILIRUB SERPL-MCNC: 0.6 MG/DL (ref 0.1–1)
BUN SERPL-MCNC: 24 MG/DL (ref 8–23)
BUN SERPL-MCNC: 26 MG/DL (ref 8–23)
CALCIUM SERPL-MCNC: 9 MG/DL (ref 8.7–10.5)
CALCIUM SERPL-MCNC: 9.2 MG/DL (ref 8.7–10.5)
CHLORIDE SERPL-SCNC: 103 MMOL/L (ref 95–110)
CHLORIDE SERPL-SCNC: 99 MMOL/L (ref 95–110)
CO2 SERPL-SCNC: 22 MMOL/L (ref 23–29)
CO2 SERPL-SCNC: 28 MMOL/L (ref 23–29)
CREAT SERPL-MCNC: 1 MG/DL (ref 0.5–1.4)
CREAT SERPL-MCNC: 1.1 MG/DL (ref 0.5–1.4)
DIFFERENTIAL METHOD BLD: ABNORMAL
EOSINOPHIL # BLD AUTO: 0 K/UL (ref 0–0.5)
EOSINOPHIL NFR BLD: 0 % (ref 0–8)
ERYTHROCYTE [DISTWIDTH] IN BLOOD BY AUTOMATED COUNT: 14 % (ref 11.5–14.5)
EST. GFR  (NO RACE VARIABLE): >60 ML/MIN/1.73 M^2
EST. GFR  (NO RACE VARIABLE): >60 ML/MIN/1.73 M^2
GLUCOSE SERPL-MCNC: 290 MG/DL (ref 70–110)
GLUCOSE SERPL-MCNC: 297 MG/DL (ref 70–110)
HCT VFR BLD AUTO: 39.9 % (ref 40–54)
HGB BLD-MCNC: 13.5 G/DL (ref 14–18)
IMM GRANULOCYTES # BLD AUTO: 0.09 K/UL (ref 0–0.04)
IMM GRANULOCYTES NFR BLD AUTO: 0.7 % (ref 0–0.5)
LYMPHOCYTES # BLD AUTO: 0.7 K/UL (ref 1–4.8)
LYMPHOCYTES NFR BLD: 5.4 % (ref 18–48)
MAGNESIUM SERPL-MCNC: 2.1 MG/DL (ref 1.6–2.6)
MCH RBC QN AUTO: 28 PG (ref 27–31)
MCHC RBC AUTO-ENTMCNC: 33.8 G/DL (ref 32–36)
MCV RBC AUTO: 83 FL (ref 82–98)
MONOCYTES # BLD AUTO: 0.8 K/UL (ref 0.3–1)
MONOCYTES NFR BLD: 6.4 % (ref 4–15)
NEUTROPHILS # BLD AUTO: 11.1 K/UL (ref 1.8–7.7)
NEUTROPHILS NFR BLD: 87.5 % (ref 38–73)
NRBC BLD-RTO: 0 /100 WBC
PLATELET # BLD AUTO: 221 K/UL (ref 150–450)
PMV BLD AUTO: 10.1 FL (ref 9.2–12.9)
POCT GLUCOSE: 286 MG/DL (ref 70–110)
POCT GLUCOSE: 333 MG/DL (ref 70–110)
POCT GLUCOSE: 357 MG/DL (ref 70–110)
POCT GLUCOSE: 393 MG/DL (ref 70–110)
POTASSIUM SERPL-SCNC: 3 MMOL/L (ref 3.5–5.1)
POTASSIUM SERPL-SCNC: 3.6 MMOL/L (ref 3.5–5.1)
PROT SERPL-MCNC: 6.7 G/DL (ref 6–8.4)
RBC # BLD AUTO: 4.83 M/UL (ref 4.6–6.2)
SODIUM SERPL-SCNC: 139 MMOL/L (ref 136–145)
SODIUM SERPL-SCNC: 140 MMOL/L (ref 136–145)
WBC # BLD AUTO: 12.7 K/UL (ref 3.9–12.7)

## 2024-11-08 PROCEDURE — 36415 COLL VENOUS BLD VENIPUNCTURE: CPT | Mod: HCNC

## 2024-11-08 PROCEDURE — 80053 COMPREHEN METABOLIC PANEL: CPT | Mod: HCNC

## 2024-11-08 PROCEDURE — 25000003 PHARM REV CODE 250: Mod: HCNC | Performed by: INTERNAL MEDICINE

## 2024-11-08 PROCEDURE — 63600175 PHARM REV CODE 636 W HCPCS: Mod: HCNC

## 2024-11-08 PROCEDURE — 25000242 PHARM REV CODE 250 ALT 637 W/ HCPCS: Mod: HCNC

## 2024-11-08 PROCEDURE — 25000003 PHARM REV CODE 250: Mod: HCNC

## 2024-11-08 PROCEDURE — 63600175 PHARM REV CODE 636 W HCPCS: Mod: HCNC | Performed by: INTERNAL MEDICINE

## 2024-11-08 PROCEDURE — 94761 N-INVAS EAR/PLS OXIMETRY MLT: CPT | Mod: HCNC

## 2024-11-08 PROCEDURE — 83735 ASSAY OF MAGNESIUM: CPT | Mod: HCNC

## 2024-11-08 PROCEDURE — 27000221 HC OXYGEN, UP TO 24 HOURS: Mod: HCNC

## 2024-11-08 PROCEDURE — 99233 SBSQ HOSP IP/OBS HIGH 50: CPT | Mod: HCNC,,, | Performed by: INTERNAL MEDICINE

## 2024-11-08 PROCEDURE — 21400001 HC TELEMETRY ROOM: Mod: HCNC

## 2024-11-08 PROCEDURE — 80048 BASIC METABOLIC PNL TOTAL CA: CPT | Mod: HCNC,XB

## 2024-11-08 PROCEDURE — 25000242 PHARM REV CODE 250 ALT 637 W/ HCPCS: Mod: HCNC | Performed by: INTERNAL MEDICINE

## 2024-11-08 PROCEDURE — 85025 COMPLETE CBC W/AUTO DIFF WBC: CPT | Mod: HCNC

## 2024-11-08 PROCEDURE — 94640 AIRWAY INHALATION TREATMENT: CPT | Mod: HCNC

## 2024-11-08 RX ORDER — INSULIN GLARGINE 100 [IU]/ML
13 INJECTION, SOLUTION SUBCUTANEOUS DAILY
Status: DISCONTINUED | OUTPATIENT
Start: 2024-11-08 | End: 2024-11-09

## 2024-11-08 RX ORDER — TALC
6 POWDER (GRAM) TOPICAL NIGHTLY PRN
Status: DISCONTINUED | OUTPATIENT
Start: 2024-11-08 | End: 2024-11-11 | Stop reason: HOSPADM

## 2024-11-08 RX ADMIN — LEVOFLOXACIN 750 MG: 5 INJECTION, SOLUTION INTRAVENOUS at 04:11

## 2024-11-08 RX ADMIN — ISOSORBIDE MONONITRATE 30 MG: 30 TABLET, EXTENDED RELEASE ORAL at 08:11

## 2024-11-08 RX ADMIN — SUCRALFATE 1 G: 1 TABLET ORAL at 09:11

## 2024-11-08 RX ADMIN — BUDESONIDE 1 MG: 0.5 INHALANT RESPIRATORY (INHALATION) at 08:11

## 2024-11-08 RX ADMIN — AMLODIPINE BESYLATE 10 MG: 5 TABLET ORAL at 08:11

## 2024-11-08 RX ADMIN — INSULIN ASPART 6 UNITS: 100 INJECTION, SOLUTION INTRAVENOUS; SUBCUTANEOUS at 08:11

## 2024-11-08 RX ADMIN — SUCRALFATE 1 G: 1 TABLET ORAL at 04:11

## 2024-11-08 RX ADMIN — DIPHENHYDRAMINE HYDROCHLORIDE 25 MG: 25 CAPSULE ORAL at 09:11

## 2024-11-08 RX ADMIN — INSULIN ASPART 10 UNITS: 100 INJECTION, SOLUTION INTRAVENOUS; SUBCUTANEOUS at 02:11

## 2024-11-08 RX ADMIN — MYCOPHENOLATE MOFETIL 1000 MG: 250 CAPSULE ORAL at 08:11

## 2024-11-08 RX ADMIN — SUCRALFATE 1 G: 1 TABLET ORAL at 10:11

## 2024-11-08 RX ADMIN — INSULIN ASPART 4 UNITS: 100 INJECTION, SOLUTION INTRAVENOUS; SUBCUTANEOUS at 09:11

## 2024-11-08 RX ADMIN — LEVALBUTEROL HYDROCHLORIDE 0.63 MG: 0.63 SOLUTION RESPIRATORY (INHALATION) at 02:11

## 2024-11-08 RX ADMIN — FAMOTIDINE 40 MG: 20 TABLET ORAL at 08:11

## 2024-11-08 RX ADMIN — FUROSEMIDE 40 MG: 10 INJECTION, SOLUTION INTRAMUSCULAR; INTRAVENOUS at 08:11

## 2024-11-08 RX ADMIN — SUCRALFATE 1 G: 1 TABLET ORAL at 07:11

## 2024-11-08 RX ADMIN — METHYLPREDNISOLONE SODIUM SUCCINATE 80 MG: 40 INJECTION, POWDER, FOR SOLUTION INTRAMUSCULAR; INTRAVENOUS at 08:11

## 2024-11-08 RX ADMIN — METOPROLOL SUCCINATE 50 MG: 50 TABLET, EXTENDED RELEASE ORAL at 08:11

## 2024-11-08 RX ADMIN — TAMSULOSIN HYDROCHLORIDE 0.4 MG: 0.4 CAPSULE ORAL at 08:11

## 2024-11-08 RX ADMIN — INSULIN GLARGINE 13 UNITS: 100 INJECTION, SOLUTION SUBCUTANEOUS at 08:11

## 2024-11-08 RX ADMIN — INSULIN ASPART 10 UNITS: 100 INJECTION, SOLUTION INTRAVENOUS; SUBCUTANEOUS at 04:11

## 2024-11-08 RX ADMIN — ASPIRIN 81 MG CHEWABLE TABLET 81 MG: 81 TABLET CHEWABLE at 08:11

## 2024-11-08 RX ADMIN — LEVALBUTEROL HYDROCHLORIDE 0.63 MG: 0.63 SOLUTION RESPIRATORY (INHALATION) at 08:11

## 2024-11-08 RX ADMIN — SACUBITRIL AND VALSARTAN 1 TABLET: 24; 26 TABLET, FILM COATED ORAL at 08:11

## 2024-11-08 RX ADMIN — BENZONATATE 100 MG: 100 CAPSULE ORAL at 07:11

## 2024-11-08 RX ADMIN — ARFORMOTEROL TARTRATE 15 MCG: 15 SOLUTION RESPIRATORY (INHALATION) at 08:11

## 2024-11-08 RX ADMIN — ENOXAPARIN SODIUM 40 MG: 40 INJECTION SUBCUTANEOUS at 04:11

## 2024-11-08 RX ADMIN — POTASSIUM BICARBONATE 50 MEQ: 977.5 TABLET, EFFERVESCENT ORAL at 08:11

## 2024-11-08 RX ADMIN — MYCOPHENOLATE MOFETIL 1000 MG: 250 CAPSULE ORAL at 09:11

## 2024-11-08 RX ADMIN — ATORVASTATIN CALCIUM 40 MG: 40 TABLET, FILM COATED ORAL at 09:11

## 2024-11-08 NOTE — TELEPHONE ENCOUNTER
----- Message from  Sonali sent at 11/8/2024 11:39 AM CST -----  Regarding: Hospital Follow Up  Good morning. Please contact patient to schedule hospital follow up; he will likely discharge over the weekend.  Thank you!

## 2024-11-08 NOTE — PROGRESS NOTES
[facility] Medicine  Progress Note    Patient Name: Emmanuel Grant  MRN: [id number]  Patient Class: IP- Inpatient   Admission Date: 11/5/2024  Length of Stay: 2 days  Attending Physician: Kristian Han MD  Primary Care Provider: Wilfredo De Souza MD        Subjective:     Principal Problem:Acute on chronic hypoxic respiratory failure        HPI:  73 y.o. man with h/o NIDDM type 2 (A1c 8 in September 2024), CAD (with h/o stent), Essential HTN, h/o CVA, ILD complicated by chronic hypoxic respiratory failure (on 2L NC at home) and recently treated with MMF 500mg PO BID (started 9/2024) presents to the ER with worsening SOB and cough. COVID/Flu negative. Finished a round of Azithromycin. NO c/o Fevers or chills. NO N/V/Diarrhea. No chest pain. States can't stop sneezing or coughing.  [place] ED noted Sats upper 80s on his home 2L NC. D/w pulmonary on call Dr. Pérez who recommended transfer to [facility] and to start steroids, Levaquin 750mg IV qday and scheduled/PRN Duonebs.   Labs with normal WBC and stable H/H. Lytes unremarkable and renal function stable.  Glucose 147. VBG 7.3. D-Dimer normal and BNP normal Troponin negative x1 with NO STEMI on EKG.     CXR with:   Monitoring EKG leads are present. The trachea is unremarkable. The cardiomediastinal silhouette is stable. There is no evidence of free air beneath the hemidiaphragms. There are no pleural effusions. There is no evidence of a pneumothorax. There is no evidence of pneumomediastinum. There is increase in the appearance of diffuse bilateral airspace opacities. There are degenerative changes in the osseous structures.             Overview/Hospital Course:  Patient admitted for acute on chronic respiratory failure. Evaluated by Pulmonology who recommended continued diuresis as tolerated, Solu-Medrol, increasing CellCept dose and continous titration of O2 to maintain saturations >90%. Mild improvement noted as patient now able to ambulate to the  bathroom, however, he continued to report SOB despite optimized therapy recommended by Pulmonology. Likely a result of ILD. Possibly new baseline. Pulmonology will continue to follow.     Interval History: Patient seen and examined. Reports improvement of SOB. Able to ambulate to bathroom without BOSTON. Satting 98% on home 2L.     Review of Systems   Constitutional:  Negative for chills, fatigue and fever.   Respiratory:  Positive for cough and shortness of breath.    Cardiovascular:  Negative for chest pain, palpitations and leg swelling.   Gastrointestinal:  Negative for abdominal pain, constipation, diarrhea, nausea and vomiting.     Objective:     Vital Signs (Most Recent):  Temp: 97.8 °F (36.6 °C) (11/08/24 0748)  Pulse: 89 (11/08/24 0846)  Resp: 20 (11/08/24 0846)  BP: (!) 141/84 (11/08/24 0748)  SpO2: 98 % (11/08/24 0846) Vital Signs (24h Range):  Temp:  [97.8 °F (36.6 °C)-98.3 °F (36.8 °C)] 97.8 °F (36.6 °C)  Pulse:  [65-91] 89  Resp:  [16-20] 20  SpO2:  [97 %-99 %] 98 %  BP: (114-141)/(74-89) 141/84     Weight: 68 kg (150 lb)  Body mass index is 22.81 kg/m².    Intake/Output Summary (Last 24 hours) at 11/8/2024 1022  Last data filed at 11/8/2024 0942  Gross per 24 hour   Intake 600 ml   Output 2600 ml   Net -2000 ml         Physical Exam  Vitals and nursing note reviewed.   HENT:      Head: Normocephalic.      Mouth/Throat:      Mouth: Mucous membranes are moist.      Pharynx: Oropharynx is clear.   Eyes:      Conjunctiva/sclera: Conjunctivae normal.   Cardiovascular:      Rate and Rhythm: Normal rate and regular rhythm.   Pulmonary:      Effort: No respiratory distress.      Breath sounds: No wheezing.      Comments: Diffuse crackles. On 2L NC.   Abdominal:      General: Bowel sounds are normal.      Palpations: Abdomen is soft.   Musculoskeletal:      Right lower leg: No edema.      Left lower leg: No edema.   Neurological:      Mental Status: He is alert and oriented to person, place, and time.              Significant Labs: All pertinent labs within the past 24 hours have been reviewed.    Significant Imaging: I have reviewed all pertinent imaging results/findings within the past 24 hours.    Assessment/Plan:      * Acute on chronic hypoxic respiratory failure  Patient with Hypercapnic and Hypoxic Respiratory failure which is Acute on chronic.  he is on home oxygen at 2 LPM. Supplemental oxygen was provided and noted- Oxygen Concentration (%):  [50] 50    .   Signs/symptoms of respiratory failure include- increased work of breathing and cough . Contributing diagnoses includes - Interstitial lung disease Labs and images were reviewed.     F/u with pulmonary consult.     Hypokalemia  Patient's most recent potassium results are listed below.   Recent Labs     11/06/24  0608 11/08/24  0428   K 3.6 3.0*     Plan  - Replete potassium per protocol  - Monitor potassium Daily  - Patient's hypokalemia is stable  - Likely 2/2 albuterol treatments. Continue to monitor.    Essential hypertension  Patients blood pressure range in the last 24 hours was: BP  Min: 116/83  Max: 150/89.The patient's inpatient anti-hypertensive regimen is listed below:  Current Antihypertensives  hydrALAZINE injection 5 mg, Every 6 hours PRN, Intravenous  furosemide injection 40 mg, Daily, Intravenous  amLODIPine tablet 10 mg, Daily, Oral  isosorbide mononitrate 24 hr tablet 30 mg, Daily, Oral  metoprolol succinate (TOPROL-XL) 24 hr tablet 50 mg, Daily, Oral  nitroGLYCERIN SL tablet 0.4 mg, Every 5 min PRN, Sublingual    Plan  - BP is controlled, no changes needed to their regimen  - Diet adjusted    Coronary artery disease involving native coronary artery of native heart with angina pectoris  Patient with known CAD s/p stent placement, which is controlled Will continue ASA and Statin and monitor for S/Sx of angina/ACS. Continue to monitor on telemetry. Troponin negative x1 and will f/u on repeat troponin levels. Monitor on Tele. NO c/o chest pain at  this time.     Interstitial lung disease exacerbation  F/u with pulmonary consult. Cont. Steroids, IV lasix, IV abx and nebs per pulmonary recs. Cont. MMF 500mg PO BID for now.     Results for orders placed during the hospital encounter of 10/17/24    Echo    Interpretation Summary    Left Ventricle: Regional wall motion abnormalities present. There is normal systolic function with a visually estimated ejection fraction of 55 %. Grade I diastolic dysfunction. federico septal hypokinesis    Right Ventricle: Normal right ventricular cavity size. Systolic function is normal.    Left Atrium: Left atrium is mildly dilated.    IVC/SVC: Normal venous pressure at 3 mmHg.    Pulmonology consulted:   Continue diuresis as tolerated  Solu-Medrol q.day  CellCept 1000mg BID (monitor closely for GI symptoms)  Titrate O2 to maintain saturations >90%  Continue budesonide, arformoterol nebulizer  Xopenex prn    Type 2 diabetes mellitus without complication, without long-term current use of insulin  Patient's FSGs are controlled on current medication regimen.  Last A1c reviewed-   Lab Results   Component Value Date    HGBA1C 8.0 (H) 09/25/2024     Most recent fingerstick glucose reviewed-   Recent Labs   Lab 11/07/24  1640 11/07/24  1841 11/07/24  1943 11/08/24  0747   POCTGLUCOSE 426* 332* 320* 286*       Current correctional scale  Moderate  Maintain anti-hyperglycemic dose as follows-   Antihyperglycemics (From admission, onward)      Start     Stop Route Frequency Ordered    11/08/24 0900  insulin glargine U-100 (Lantus) pen 13 Units         -- SubQ Daily 11/08/24 0741    11/06/24 1529  insulin aspart U-100 pen 0-10 Units         -- SubQ Before meals & nightly PRN 11/06/24 1430          Hold Oral hypoglycemics while patient is in the hospital. She takes Amaryl 2mg PO BID and Metformin 1gm PO BID       VTE Risk Mitigation (From admission, onward)           Ordered     enoxaparin injection 40 mg  Daily         11/05/24 1726     IP VTE  HIGH RISK PATIENT  Once         11/05/24 1726     Place sequential compression device  Until discontinued         11/05/24 1726                    Discharge Planning   DERIC:      Code Status: Full Code   Is the patient medically ready for discharge?:     Reason for patient still in hospital (select all that apply): Treatment and Consult recommendations  Discharge Plan A: Home                  mUang Miller PA-C  Department of Hospital Medicine   [facility]

## 2024-11-08 NOTE — PLAN OF CARE
Problem: Adult Inpatient Plan of Care  Goal: Plan of Care Review  Outcome: Progressing  Goal: Patient-Specific Goal (Individualized)  Outcome: Progressing  Goal: Absence of Hospital-Acquired Illness or Injury  Outcome: Progressing  Goal: Optimal Comfort and Wellbeing  Outcome: Progressing  Goal: Readiness for Transition of Care  Outcome: Progressing     Problem: COPD (Chronic Obstructive Pulmonary Disease)  Goal: Optimal Chronic Illness Coping  Outcome: Progressing  Goal: Optimal Level of Functional [place]  Outcome: Progressing  Goal: Absence of Infection Signs and Symptoms  Outcome: Progressing  Goal: Improved Oral Intake  Outcome: Progressing  Goal: Effective Oxygenation and Ventilation  Outcome: Progressing     Problem: Diabetes Comorbidity  Goal: Blood Glucose Level Within Targeted Range  Outcome: Progressing

## 2024-11-08 NOTE — PROGRESS NOTES
[facility]  Pulmonology  Progress Note    Patient Name: Emmanuel Grant  MRN: [id number]  Admission Date: 11/5/2024  Hospital Length of Stay: 2 days  Code Status: Full Code  Attending Provider: Kristian Han MD  Primary Care Provider: Wilfredo De Souaz MD   Principal Problem: Acute on chronic hypoxic respiratory failure    Subjective:     Interval History:  Patient continues to have mild improvement, but remains very dyspneic in conversation.  Saturations are stable/improved on 2LPM via NC.     Review of Systems   Constitutional:  Positive for activity change and fatigue. Negative for fever.   Respiratory:  Positive for cough and shortness of breath.      Objective:     Vital Signs (Most Recent):  Temp: 98.1 °F (36.7 °C) (11/08/24 1054)  Pulse: 76 (11/08/24 1434)  Resp: 18 (11/08/24 1434)  BP: 118/73 (11/08/24 1054)  SpO2: 99 % (11/08/24 1434) Vital Signs (24h Range):  Temp:  [97.8 °F (36.6 °C)-98.3 °F (36.8 °C)] 98.1 °F (36.7 °C)  Pulse:  [65-89] 76  Resp:  [16-20] 18  SpO2:  [98 %-99 %] 99 %  BP: (114-141)/(73-84) 118/73     Weight: 68 kg (150 lb)  Body mass index is 22.81 kg/m².      Intake/Output Summary (Last 24 hours) at 11/8/2024 1442  Last data filed at 11/8/2024 1322  Gross per 24 hour   Intake 480 ml   Output 2400 ml   Net -1920 ml        Physical Exam  Constitutional:       General: He is not in acute distress.     Appearance: Normal appearance. He is normal weight. He is ill-appearing. He is not toxic-appearing.   HENT:      Head: Normocephalic and atraumatic.      Nose: Nose normal.      Mouth/Throat:      Mouth: Mucous membranes are moist.   Eyes:      Extraocular Movements: Extraocular movements intact.      Pupils: Pupils are equal, round, and reactive to light.   Cardiovascular:      Rate and Rhythm: Normal rate and regular rhythm.      Heart sounds: No murmur heard.     No friction rub. No gallop.   Pulmonary:      Comments: Diffuse crackles auscultated bilaterally.  Abdominal:      General:  Abdomen is flat. There is no distension.      Palpations: Abdomen is soft.      Tenderness: There is no abdominal tenderness.   Musculoskeletal:         General: Normal range of motion.      Cervical back: Normal range of motion.      Right lower leg: No edema.      Left lower leg: No edema.   Skin:     General: Skin is warm.      Capillary Refill: Capillary refill takes less than 2 seconds.   Neurological:      General: No focal deficit present.      Mental Status: He is alert and oriented to person, place, and time. Mental status is at baseline.   Psychiatric:         Mood and Affect: Mood normal.         Behavior: Behavior normal.         Thought Content: Thought content normal.         Judgment: Judgment normal.          Vents:  Oxygen Concentration (%): 28 (11/08/24 0846)    Lines/Drains/Airways       Peripheral Intravenous Line  Duration                  Peripheral IV - Single Lumen 11/07/24 0942 18 G Anterior;Distal;Left Antecubital 1 day                    Significant Labs:    CBC/Anemia Profile:  Recent Labs   Lab 11/08/24 0428   WBC 12.70   HGB 13.5*   HCT 39.9*      MCV 83   RDW 14.0        Chemistries:  Recent Labs   Lab 11/08/24 0428 11/08/24  1219    139   K 3.0* 3.6    99   CO2 22* 28   BUN 26* 24*   CREATININE 1.0 1.1   CALCIUM 9.0 9.2   ALBUMIN 3.2*  --    PROT 6.7  --    BILITOT 0.6  --    ALKPHOS 55  --    ALT 15  --    AST 11  --    MG  --  2.1       All pertinent labs within the past 24 hours have been reviewed.    Significant Imaging:   I have reviewed all pertinent imaging results/findings within the past 24 hours.    [facility]  Recent Labs   Lab 11/05/24  1856   PH 7.348*   PO2 25*   PCO2 43.4   HCO3 23.8*   BE -2     Assessment/Plan:     Pulmonary  * Acute on chronic hypoxic respiratory failure  See section on interstitial lung disease    Interstitial lung disease exacerbation  Patient likely with decompensation of his interstitial lung disease.  Hopefully this does not  reflect a true ILD exacerbation as a mortality with this issue is quite high.  He has previously responded to increased doses of steroids.  He presented taking 500 mg b.i.d. of CellCept.  No evidence of fluid overload on his lower extremities.  - recommend diuresis as tolerated   - continue 1 milligram/kilogram Solu-Medrol q.day.  tomorrow would consider reducing to PO dosing.   - increase CellCept to 1000 mg b.i.d.  monitor closely for GI symptoms.  If tolerated for the next 2 weeks, advance to 1500mg BID.   - oxygen to target saturations of greater than 90%.    - PTOT   - He will need follow up with Pulmonary after discharge.  - continue budesonide and arformoterol nebulizer therapy.  continue p.r.n. Xopenex      I will continue to follow along.     Darian Pérez MD  Pulmonology  [facility] - Med Surg

## 2024-11-08 NOTE — ASSESSMENT & PLAN NOTE
Patient's FSGs are controlled on current medication regimen.  Last A1c reviewed-   Lab Results   Component Value Date    HGBA1C 8.0 (H) 09/25/2024     Most recent fingerstick glucose reviewed-   Recent Labs   Lab 11/07/24  1640 11/07/24  1841 11/07/24  1943 11/08/24  0747   POCTGLUCOSE 426* 332* 320* 286*       Current correctional scale  Moderate  Maintain anti-hyperglycemic dose as follows-   Antihyperglycemics (From admission, onward)      Start     Stop Route Frequency Ordered    11/08/24 0900  insulin glargine U-100 (Lantus) pen 13 Units         -- SubQ Daily 11/08/24 0741    11/06/24 1529  insulin aspart U-100 pen 0-10 Units         -- SubQ Before meals & nightly PRN 11/06/24 1430          Hold Oral hypoglycemics while patient is in the hospital. She takes Amaryl 2mg PO BID and Metformin 1gm PO BID

## 2024-11-08 NOTE — PT/OT/SLP PROGRESS
Occupational Therapy      Patient Name:  Emmanuel Grant   MRN:  [id number]    Per evaluating PT, pt is mod I w/ functional mobility and transfers. Please refer to eval for any further recommendations. OT to sign off.     11/8/2024

## 2024-11-08 NOTE — NURSING
Patient has been up all day moving around. He is in good spirits. Patient blood sugar has not been well controlled today. He is now on a basal to see if it can help control it. He had a episode of a cough around 1pm and cough meds was given and since has subsided. Patient is resting and all safety needs are being met.

## 2024-11-08 NOTE — PLAN OF CARE
Case Management Assessment     PCP: Wilfredo De Souza MD    Pharmacy:   [id number] [address] AT [facility] [address] [address]  Phone: [phone] Fax: [phone]        Patient Arrived From: Home  Existing Help at Home: Emmanuel Grant Jr (Son) [phone]      Barriers to Discharge: None    Discharge Plan:    A. Home   B. Home with family  Spoke with patient at bedside; he was up and walking around his room, trying to get his strength back.  Patient stated he lives alone, but his adult children are available to assist him whenever he needs.  Patient reported being independent with ADLs and uses Oxygen and a glucometer at home; patient also has a nebulizer but is out of treatment medications for it.  Patient voiced concerns about the pricing for his Heart medication and acid reflux medication; he stated he would prefer to switch to [facility] pharmacy if able to get them cheaper.  Pt is also interested in being screened for Medicaid.  At discharge, patient stated he will need transportation the [facility]; his car is there.  CM to continue to assess for and until discharge.    11/07/24 3658   Discharge Assessment   Assessment Type Discharge Planning Assessment   Confirmed/corrected address, phone number and insurance Yes   Confirmed Demographics Correct on Facesheet   Source of Information patient   Does patient/caregiver understand observation status Yes   Communicated DERIC with patient/caregiver Date not available/Unable to determine   Reason For Admission Acute on chronic hypoxic respiratory failure   People in Home alone   Facility Arrived From: Home   Do you expect to return to your current living situation? Yes   Do you have help at home or someone to help you manage your care at home? Yes   Who are your caregiver(s) and their phone number(s)? Emmanuel Grant Jr (Son)  [phone]   Prior to  hospitilization cognitive status: Unable to Assess   Current cognitive status: Alert/Oriented   Walking or Climbing Stairs Difficulty no   Dressing/Bathing Difficulty no   Equipment Currently Used at Home oxygen;glucometer;nebulizer   Readmission within 30 days? No   Patient currently being followed by outpatient case management? No   Do you currently have service(s) that help you manage your care at home? No   Do you take prescription medications? Yes   Do you have prescription coverage? Yes   Coverage [employer] MANAGED MEDICARE - [employer] MEDICARE HMO - CAPITATED   Do you have any problems affording any of your prescribed medications? Yes   If yes, what medications? Heart medication and GERD medication   Is the patient taking medications as prescribed? yes   Who is going to help you get home at discharge? Will need transportation home   How do you get to doctors appointments? car, drives self   Are you on dialysis? No   Do you take coumadin? No   Discharge Plan A Home   Discharge Plan B Home with family   DME Needed Upon Discharge  other (see comments)  (TBD)   Discharge Plan discussed with: Patient   Transition of Care Barriers None

## 2024-11-08 NOTE — ASSESSMENT & PLAN NOTE
Patient likely with decompensation of his interstitial lung disease.  Hopefully this does not reflect a true ILD exacerbation as a mortality with this issue is quite high.  He has previously responded to increased doses of steroids.  He presented taking 500 mg b.i.d. of CellCept.  No evidence of fluid overload on his lower extremities.  - recommend diuresis as tolerated   - continue 1 milligram/kilogram Solu-Medrol q.day.  tomorrow would consider reducing to PO dosing.   - increase CellCept to 1000 mg b.i.d.  monitor closely for GI symptoms.  If tolerated for the next 2 weeks, advance to 1500mg BID.   - oxygen to target saturations of greater than 90%.    - PTOT   - He will need follow up with Pulmonary after discharge.  - continue budesonide and arformoterol nebulizer therapy.  continue p.r.n. Xopenex

## 2024-11-08 NOTE — TELEPHONE ENCOUNTER
Attempted to contact patient in regards to scheduling a follow up visit. No Answer. Patient voicemail is not setup yet.

## 2024-11-08 NOTE — SUBJECTIVE & OBJECTIVE
Interval History:  Patient continues to have mild improvement, but remains very dyspneic in conversation.  Saturations are stable/improved on 2LPM via NC.     Review of Systems   Constitutional:  Positive for activity change and fatigue. Negative for fever.   Respiratory:  Positive for cough and shortness of breath.      Objective:     Vital Signs (Most Recent):  Temp: 98.1 °F (36.7 °C) (11/08/24 1054)  Pulse: 76 (11/08/24 1434)  Resp: 18 (11/08/24 1434)  BP: 118/73 (11/08/24 1054)  SpO2: 99 % (11/08/24 1434) Vital Signs (24h Range):  Temp:  [97.8 °F (36.6 °C)-98.3 °F (36.8 °C)] 98.1 °F (36.7 °C)  Pulse:  [65-89] 76  Resp:  [16-20] 18  SpO2:  [98 %-99 %] 99 %  BP: (114-141)/(73-84) 118/73     Weight: 68 kg (150 lb)  Body mass index is 22.81 kg/m².      Intake/Output Summary (Last 24 hours) at 11/8/2024 1442  Last data filed at 11/8/2024 1322  Gross per 24 hour   Intake 480 ml   Output 2400 ml   Net -1920 ml        Physical Exam  Constitutional:       General: He is not in acute distress.     Appearance: Normal appearance. He is normal weight. He is ill-appearing. He is not toxic-appearing.   HENT:      Head: Normocephalic and atraumatic.      Nose: Nose normal.      Mouth/Throat:      Mouth: Mucous membranes are moist.   Eyes:      Extraocular Movements: Extraocular movements intact.      Pupils: Pupils are equal, round, and reactive to light.   Cardiovascular:      Rate and Rhythm: Normal rate and regular rhythm.      Heart sounds: No murmur heard.     No friction rub. No gallop.   Pulmonary:      Comments: Diffuse crackles auscultated bilaterally.  Abdominal:      General: Abdomen is flat. There is no distension.      Palpations: Abdomen is soft.      Tenderness: There is no abdominal tenderness.   Musculoskeletal:         General: Normal range of motion.      Cervical back: Normal range of motion.      Right lower leg: No edema.      Left lower leg: No edema.   Skin:     General: Skin is warm.      Capillary  Refill: Capillary refill takes less than 2 seconds.   Neurological:      General: No focal deficit present.      Mental Status: He is alert and oriented to person, place, and time. Mental status is at baseline.   Psychiatric:         Mood and Affect: Mood normal.         Behavior: Behavior normal.         Thought Content: Thought content normal.         Judgment: Judgment normal.          Vents:  Oxygen Concentration (%): 28 (11/08/24 0846)    Lines/Drains/Airways       Peripheral Intravenous Line  Duration                  Peripheral IV - Single Lumen 11/07/24 0942 18 G Anterior;Distal;Left Antecubital 1 day                    Significant Labs:    CBC/Anemia Profile:  Recent Labs   Lab 11/08/24 0428   WBC 12.70   HGB 13.5*   HCT 39.9*      MCV 83   RDW 14.0        Chemistries:  Recent Labs   Lab 11/08/24 0428 11/08/24  1219    139   K 3.0* 3.6    99   CO2 22* 28   BUN 26* 24*   CREATININE 1.0 1.1   CALCIUM 9.0 9.2   ALBUMIN 3.2*  --    PROT 6.7  --    BILITOT 0.6  --    ALKPHOS 55  --    ALT 15  --    AST 11  --    MG  --  2.1       All pertinent labs within the past 24 hours have been reviewed.    Significant Imaging:   I have reviewed all pertinent imaging results/findings within the past 24 hours.

## 2024-11-08 NOTE — PLAN OF CARE
CM emailed patient's information to [facility] to be screened for Medicaid.

## 2024-11-08 NOTE — PLAN OF CARE
11/08/24 1138   Medicare Message   Important Message from Medicare regarding Discharge Appeal Rights Given to patient/caregiver;Explained to patient/caregiver;Signed/date by patient/caregiver   Date IMM was signed 11/08/24   Time IMM was signed 1103

## 2024-11-08 NOTE — ASSESSMENT & PLAN NOTE
Patient's most recent potassium results are listed below.   Recent Labs     11/06/24  0608 11/08/24  0428   K 3.6 3.0*     Plan  - Replete potassium per protocol  - Monitor potassium Daily  - Patient's hypokalemia is stable  - Likely 2/2 albuterol treatments. Continue to monitor.

## 2024-11-09 LAB
ALBUMIN SERPL BCP-MCNC: 3.1 G/DL (ref 3.5–5.2)
ALP SERPL-CCNC: 49 U/L (ref 40–150)
ALT SERPL W/O P-5'-P-CCNC: 16 U/L (ref 10–44)
ANION GAP SERPL CALC-SCNC: 10 MMOL/L (ref 8–16)
AST SERPL-CCNC: 12 U/L (ref 10–40)
BASOPHILS # BLD AUTO: 0.01 K/UL (ref 0–0.2)
BASOPHILS NFR BLD: 0.1 % (ref 0–1.9)
BILIRUB SERPL-MCNC: 0.6 MG/DL (ref 0.1–1)
BUN SERPL-MCNC: 25 MG/DL (ref 8–23)
CALCIUM SERPL-MCNC: 9.2 MG/DL (ref 8.7–10.5)
CHLORIDE SERPL-SCNC: 101 MMOL/L (ref 95–110)
CO2 SERPL-SCNC: 29 MMOL/L (ref 23–29)
CREAT SERPL-MCNC: 1 MG/DL (ref 0.5–1.4)
DIFFERENTIAL METHOD BLD: ABNORMAL
EOSINOPHIL # BLD AUTO: 0 K/UL (ref 0–0.5)
EOSINOPHIL NFR BLD: 0 % (ref 0–8)
ERYTHROCYTE [DISTWIDTH] IN BLOOD BY AUTOMATED COUNT: 13.8 % (ref 11.5–14.5)
EST. GFR  (NO RACE VARIABLE): >60 ML/MIN/1.73 M^2
GLUCOSE SERPL-MCNC: 224 MG/DL (ref 70–110)
HCT VFR BLD AUTO: 40.1 % (ref 40–54)
HGB BLD-MCNC: 13.6 G/DL (ref 14–18)
IMM GRANULOCYTES # BLD AUTO: 0.03 K/UL (ref 0–0.04)
IMM GRANULOCYTES NFR BLD AUTO: 0.3 % (ref 0–0.5)
LYMPHOCYTES # BLD AUTO: 0.9 K/UL (ref 1–4.8)
LYMPHOCYTES NFR BLD: 10 % (ref 18–48)
MCH RBC QN AUTO: 28.4 PG (ref 27–31)
MCHC RBC AUTO-ENTMCNC: 33.9 G/DL (ref 32–36)
MCV RBC AUTO: 84 FL (ref 82–98)
MONOCYTES # BLD AUTO: 0.9 K/UL (ref 0.3–1)
MONOCYTES NFR BLD: 10.2 % (ref 4–15)
NEUTROPHILS # BLD AUTO: 7.3 K/UL (ref 1.8–7.7)
NEUTROPHILS NFR BLD: 79.4 % (ref 38–73)
NRBC BLD-RTO: 0 /100 WBC
PLATELET # BLD AUTO: 205 K/UL (ref 150–450)
PMV BLD AUTO: 10.3 FL (ref 9.2–12.9)
POCT GLUCOSE: 225 MG/DL (ref 70–110)
POCT GLUCOSE: 302 MG/DL (ref 70–110)
POCT GLUCOSE: 378 MG/DL (ref 70–110)
POCT GLUCOSE: 384 MG/DL (ref 70–110)
POTASSIUM SERPL-SCNC: 3.2 MMOL/L (ref 3.5–5.1)
PROT SERPL-MCNC: 6.3 G/DL (ref 6–8.4)
RBC # BLD AUTO: 4.79 M/UL (ref 4.6–6.2)
SODIUM SERPL-SCNC: 140 MMOL/L (ref 136–145)
WBC # BLD AUTO: 9.14 K/UL (ref 3.9–12.7)

## 2024-11-09 PROCEDURE — 25000242 PHARM REV CODE 250 ALT 637 W/ HCPCS: Mod: HCNC

## 2024-11-09 PROCEDURE — 25000003 PHARM REV CODE 250: Mod: HCNC | Performed by: INTERNAL MEDICINE

## 2024-11-09 PROCEDURE — 25000242 PHARM REV CODE 250 ALT 637 W/ HCPCS: Mod: HCNC | Performed by: INTERNAL MEDICINE

## 2024-11-09 PROCEDURE — 63600175 PHARM REV CODE 636 W HCPCS: Mod: HCNC | Performed by: INTERNAL MEDICINE

## 2024-11-09 PROCEDURE — 94640 AIRWAY INHALATION TREATMENT: CPT | Mod: HCNC

## 2024-11-09 PROCEDURE — 63600175 PHARM REV CODE 636 W HCPCS: Mod: HCNC

## 2024-11-09 PROCEDURE — 80053 COMPREHEN METABOLIC PANEL: CPT | Mod: HCNC

## 2024-11-09 PROCEDURE — 21400001 HC TELEMETRY ROOM: Mod: HCNC

## 2024-11-09 PROCEDURE — 36415 COLL VENOUS BLD VENIPUNCTURE: CPT | Mod: HCNC

## 2024-11-09 PROCEDURE — 85025 COMPLETE CBC W/AUTO DIFF WBC: CPT | Mod: HCNC

## 2024-11-09 PROCEDURE — 25000003 PHARM REV CODE 250: Mod: HCNC

## 2024-11-09 PROCEDURE — 94761 N-INVAS EAR/PLS OXIMETRY MLT: CPT | Mod: HCNC

## 2024-11-09 PROCEDURE — 27000221 HC OXYGEN, UP TO 24 HOURS: Mod: HCNC

## 2024-11-09 RX ORDER — PREDNISONE 20 MG/1
60 TABLET ORAL DAILY
Status: DISCONTINUED | OUTPATIENT
Start: 2024-11-10 | End: 2024-11-11 | Stop reason: HOSPADM

## 2024-11-09 RX ORDER — INSULIN GLARGINE 100 [IU]/ML
15 INJECTION, SOLUTION SUBCUTANEOUS DAILY
Status: DISCONTINUED | OUTPATIENT
Start: 2024-11-09 | End: 2024-11-11 | Stop reason: HOSPADM

## 2024-11-09 RX ADMIN — TAMSULOSIN HYDROCHLORIDE 0.4 MG: 0.4 CAPSULE ORAL at 08:11

## 2024-11-09 RX ADMIN — MELATONIN TAB 3 MG 6 MG: 3 TAB at 08:11

## 2024-11-09 RX ADMIN — SUCRALFATE 1 G: 1 TABLET ORAL at 06:11

## 2024-11-09 RX ADMIN — INSULIN ASPART 10 UNITS: 100 INJECTION, SOLUTION INTRAVENOUS; SUBCUTANEOUS at 04:11

## 2024-11-09 RX ADMIN — METHYLPREDNISOLONE SODIUM SUCCINATE 80 MG: 40 INJECTION, POWDER, FOR SOLUTION INTRAMUSCULAR; INTRAVENOUS at 08:11

## 2024-11-09 RX ADMIN — ARFORMOTEROL TARTRATE 15 MCG: 15 SOLUTION RESPIRATORY (INHALATION) at 07:11

## 2024-11-09 RX ADMIN — BUDESONIDE 1 MG: 0.5 INHALANT RESPIRATORY (INHALATION) at 08:11

## 2024-11-09 RX ADMIN — SACUBITRIL AND VALSARTAN 1 TABLET: 24; 26 TABLET, FILM COATED ORAL at 08:11

## 2024-11-09 RX ADMIN — SUCRALFATE 1 G: 1 TABLET ORAL at 04:11

## 2024-11-09 RX ADMIN — INSULIN ASPART 5 UNITS: 100 INJECTION, SOLUTION INTRAVENOUS; SUBCUTANEOUS at 08:11

## 2024-11-09 RX ADMIN — LEVOFLOXACIN 750 MG: 5 INJECTION, SOLUTION INTRAVENOUS at 04:11

## 2024-11-09 RX ADMIN — FUROSEMIDE 40 MG: 10 INJECTION, SOLUTION INTRAMUSCULAR; INTRAVENOUS at 08:11

## 2024-11-09 RX ADMIN — ARFORMOTEROL TARTRATE 15 MCG: 15 SOLUTION RESPIRATORY (INHALATION) at 08:11

## 2024-11-09 RX ADMIN — INSULIN ASPART 4 UNITS: 100 INJECTION, SOLUTION INTRAVENOUS; SUBCUTANEOUS at 08:11

## 2024-11-09 RX ADMIN — MYCOPHENOLATE MOFETIL 1000 MG: 250 CAPSULE ORAL at 08:11

## 2024-11-09 RX ADMIN — MELATONIN TAB 3 MG 6 MG: 3 TAB at 12:11

## 2024-11-09 RX ADMIN — INSULIN GLARGINE 15 UNITS: 100 INJECTION, SOLUTION SUBCUTANEOUS at 08:11

## 2024-11-09 RX ADMIN — LEVALBUTEROL HYDROCHLORIDE 0.63 MG: 0.63 SOLUTION RESPIRATORY (INHALATION) at 02:11

## 2024-11-09 RX ADMIN — POTASSIUM BICARBONATE 50 MEQ: 977.5 TABLET, EFFERVESCENT ORAL at 08:11

## 2024-11-09 RX ADMIN — BUDESONIDE 1 MG: 0.5 INHALANT RESPIRATORY (INHALATION) at 07:11

## 2024-11-09 RX ADMIN — ISOSORBIDE MONONITRATE 30 MG: 30 TABLET, EXTENDED RELEASE ORAL at 08:11

## 2024-11-09 RX ADMIN — LEVALBUTEROL HYDROCHLORIDE 0.63 MG: 0.63 SOLUTION RESPIRATORY (INHALATION) at 07:11

## 2024-11-09 RX ADMIN — ENOXAPARIN SODIUM 40 MG: 40 INJECTION SUBCUTANEOUS at 04:11

## 2024-11-09 RX ADMIN — ASPIRIN 81 MG CHEWABLE TABLET 81 MG: 81 TABLET CHEWABLE at 08:11

## 2024-11-09 RX ADMIN — SUCRALFATE 1 G: 1 TABLET ORAL at 08:11

## 2024-11-09 RX ADMIN — ATORVASTATIN CALCIUM 40 MG: 40 TABLET, FILM COATED ORAL at 08:11

## 2024-11-09 RX ADMIN — AMLODIPINE BESYLATE 10 MG: 5 TABLET ORAL at 08:11

## 2024-11-09 RX ADMIN — SUCRALFATE 1 G: 1 TABLET ORAL at 10:11

## 2024-11-09 RX ADMIN — FAMOTIDINE 40 MG: 20 TABLET ORAL at 08:11

## 2024-11-09 RX ADMIN — INSULIN ASPART 8 UNITS: 100 INJECTION, SOLUTION INTRAVENOUS; SUBCUTANEOUS at 12:11

## 2024-11-09 RX ADMIN — LEVALBUTEROL HYDROCHLORIDE 0.63 MG: 0.63 SOLUTION RESPIRATORY (INHALATION) at 08:11

## 2024-11-09 RX ADMIN — DIPHENHYDRAMINE HYDROCHLORIDE 25 MG: 25 CAPSULE ORAL at 08:11

## 2024-11-09 RX ADMIN — METOPROLOL SUCCINATE 50 MG: 50 TABLET, EXTENDED RELEASE ORAL at 08:11

## 2024-11-09 NOTE — NURSING
Patient is sitting up in bed and hopeful of living a normal life. Patient ate all of dinner and glucose was a little elevated.Telemetry is working fine and so is his IV. All safety needs are being met.

## 2024-11-09 NOTE — ASSESSMENT & PLAN NOTE
Patients blood pressure range in the last 24 hours was: BP  Min: 109/68  Max: 150/89.The patient's inpatient anti-hypertensive regimen is listed below:  Current Antihypertensives  hydrALAZINE injection 5 mg, Every 6 hours PRN, Intravenous  furosemide injection 40 mg, Daily, Intravenous  amLODIPine tablet 10 mg, Daily, Oral  isosorbide mononitrate 24 hr tablet 30 mg, Daily, Oral  metoprolol succinate (TOPROL-XL) 24 hr tablet 50 mg, Daily, Oral  nitroGLYCERIN SL tablet 0.4 mg, Every 5 min PRN, Sublingual    Plan  - BP is controlled, no changes needed to their regimen  - Diet adjusted

## 2024-11-09 NOTE — PLAN OF CARE
Problem: Fall Injury Risk  Goal: Absence of Fall and Fall-Related Injury  Outcome: Progressing  Intervention: Identify and Manage Contributors  Flowsheets (Taken 11/9/2024 1734)  Self-Care Promotion: independence encouraged  Medication Review/Management: medications reviewed  Intervention: Promote Injury-Free Environment  Flowsheets (Taken 11/9/2024 1734)  Safety Promotion/Fall Prevention:   assistive device/personal item within reach   bed alarm refused   diversional activities provided   Fall Risk reviewed with patient/family   high risk medications identified   nonskid shoes/socks when out of bed   room near unit station

## 2024-11-09 NOTE — PLAN OF CARE
Problem: Adult Inpatient Plan of Care  Goal: Plan of Care Review  Outcome: Progressing  Flowsheets (Taken 11/9/2024 0429)  Plan of Care Reviewed With: patient  Goal: Optimal Comfort and Wellbeing  Outcome: Progressing  Intervention: Monitor Pain and Promote Comfort  Flowsheets (Taken 11/9/2024 0429)  Pain Management Interventions:   pillow support provided   quiet environment facilitated     Problem: COPD (Chronic Obstructive Pulmonary Disease)  Goal: Optimal Chronic Illness Coping  Outcome: Progressing  Goal: Optimal Level of Functional [place]  Outcome: Progressing

## 2024-11-09 NOTE — NURSING
[facility]  Nurses Note -- 4 Eyes      11/8/2024       Skin assessed on: Q Shift      [x] No Pressure Injuries Present    [x]Prevention Measures Documented    [] Yes LDA  for Pressure Injury Previously documented     [] Yes New Pressure Injury Discovered   [] LDA for New Pressure Injury Added      Attending RN:  Emi Mann RN     Second RN:  GARY George

## 2024-11-09 NOTE — NURSING
"Pt asked if he can get medicine that helps for his sleep. Pt states" I have trouble of sleeping  since I came here in the hospital", MD made aware. Melatonin ordered.      Pt noted sleeping on bed with 2L NC oxygen support. Melatonin returned to cabinet.   "

## 2024-11-09 NOTE — NURSING
"Pt woke up and got confused when nurse tech tried to get the midnight vital signs. Pt states " where am I? Who are you?, Help me up to go home.". Pt is alert and oriented only to himself. Room oriented. Easily redirected. Bed alarm set. Bed in low position. Side rails up. Vitals signs are stable. With still ongoing o2 support at 2L NC, not in distress. MD made aware. Delirium precaution ordered. Melatonin given as prn.      Pt resting on bed comfortably. Bed alarm set. Care ongoing.       0530: Pt assisted up to the bathroom with connector tubings. Pt is alert and oriented x3.   "

## 2024-11-09 NOTE — NURSING
[facility] [place]  Nurses Note -- 4 Eyes      11/9/2024       Skin assessed on: Q Shift      [x] No Pressure Injuries Present    []Prevention Measures Documented    [] Yes LDA  for Pressure Injury Previously documented     [] Yes New Pressure Injury Discovered   [] LDA for New Pressure Injury Added      Attending RN:  Jackie Chinchilla RN     Second RN:  kari

## 2024-11-09 NOTE — SUBJECTIVE & OBJECTIVE
Interval History: Patient seen and examined. Reports improvement of SOB. Ambulating around hospital room without BOSTON. Satting 100% on home 2L. Mild crackles noted on exam, however, notable improvement from clinical status on admission. No abdominal pain.     Review of Systems   Constitutional:  Negative for chills, fatigue and fever.   Respiratory:  Positive for shortness of breath (improving).    Cardiovascular:  Negative for chest pain, palpitations and leg swelling.   Gastrointestinal:  Negative for abdominal pain, constipation, diarrhea, nausea and vomiting.     Objective:     Vital Signs (Most Recent):  Temp: 97.8 °F (36.6 °C) (11/09/24 1040)  Pulse: 70 (11/09/24 1040)  Resp: 20 (11/09/24 1040)  BP: 120/78 (11/09/24 1040)  SpO2: 100 % (11/09/24 1040) Vital Signs (24h Range):  Temp:  [97.5 °F (36.4 °C)-98.1 °F (36.7 °C)] 97.8 °F (36.6 °C)  Pulse:  [58-97] 70  Resp:  [14-20] 20  SpO2:  [89 %-100 %] 100 %  BP: (109-136)/(68-90) 120/78     Weight: 68 kg (150 lb)  Body mass index is 22.81 kg/m².    Intake/Output Summary (Last 24 hours) at 11/9/2024 1046  Last data filed at 11/9/2024 0928  Gross per 24 hour   Intake 480 ml   Output 1650 ml   Net -1170 ml         Physical Exam  Vitals and nursing note reviewed.   HENT:      Head: Normocephalic.      Mouth/Throat:      Mouth: Mucous membranes are moist.      Pharynx: Oropharynx is clear.   Eyes:      Conjunctiva/sclera: Conjunctivae normal.   Cardiovascular:      Rate and Rhythm: Normal rate and regular rhythm.   Pulmonary:      Effort: No respiratory distress.      Comments: Mild crackles. On home 2L NC.   Abdominal:      General: Bowel sounds are normal.      Palpations: Abdomen is soft.   Musculoskeletal:      Right lower leg: No edema.      Left lower leg: No edema.   Neurological:      Mental Status: He is alert and oriented to person, place, and time.             Significant Labs: All pertinent labs within the past 24 hours have been reviewed.    Significant  Imaging: I have reviewed all pertinent imaging results/findings within the past 24 hours.

## 2024-11-09 NOTE — PROGRESS NOTES
[facility] Medicine  Progress Note    Patient Name: Emmanuel Grant  MRN: [id number]  Patient Class: IP- Inpatient   Admission Date: 11/5/2024  Length of Stay: 3 days  Attending Physician: Kristian Han MD  Primary Care Provider: Wilfredo De Souza MD        Subjective:     Principal Problem:Acute on chronic hypoxic respiratory failure        HPI:  73 y.o. man with h/o NIDDM type 2 (A1c 8 in September 2024), CAD (with h/o stent), Essential HTN, h/o CVA, ILD complicated by chronic hypoxic respiratory failure (on 2L NC at home) and recently treated with MMF 500mg PO BID (started 9/2024) presents to the ER with worsening SOB and cough. COVID/Flu negative. Finished a round of Azithromycin. NO c/o Fevers or chills. NO N/V/Diarrhea. No chest pain. States can't stop sneezing or coughing.  [place] ED noted Sats upper 80s on his home 2L NC. D/w pulmonary on call Dr. Pérez who recommended transfer to [facility] and to start steroids, Levaquin 750mg IV qday and scheduled/PRN Duonebs.   Labs with normal WBC and stable H/H. Lytes unremarkable and renal function stable.  Glucose 147. VBG 7.3. D-Dimer normal and BNP normal Troponin negative x1 with NO STEMI on EKG.     CXR with:   Monitoring EKG leads are present. The trachea is unremarkable. The cardiomediastinal silhouette is stable. There is no evidence of free air beneath the hemidiaphragms. There are no pleural effusions. There is no evidence of a pneumothorax. There is no evidence of pneumomediastinum. There is increase in the appearance of diffuse bilateral airspace opacities. There are degenerative changes in the osseous structures.             Overview/Hospital Course:  Patient admitted for acute on chronic respiratory failure. Evaluated by Pulmonology who recommended continued diuresis as tolerated, Solu-Medrol, increasing CellCept dose and continous titration of O2 to maintain saturations >90%. Patient euvolemic on exam and without signs of infection  throughout admission. Antibiotics continued given patient's history of ILD and immunosuppressive therapy. Patient reported gradual improvement of SOB with treatment initiated therapy recommended by Pulmonology. He is now able to ambulate to bathroom without worsening of symptoms. Adequate saturations on home 2L.     No new subjective & objective note has been filed under this hospital service since the last note was generated.      Assessment/Plan:      * Acute on chronic hypoxic respiratory failure  Patient with Hypercapnic and Hypoxic Respiratory failure which is Acute on chronic.  he is on home oxygen at 2 LPM. Supplemental oxygen was provided and noted- Oxygen Concentration (%):  [50] 50    .   Signs/symptoms of respiratory failure include- increased work of breathing and cough . Contributing diagnoses includes - Interstitial lung disease Labs and images were reviewed.     F/u with pulmonary consult.     Hypokalemia  Patient's most recent potassium results are listed below.   Recent Labs     11/08/24  0428 11/08/24  1219 11/09/24  0453   K 3.0* 3.6 3.2*       Plan  - Replete potassium per protocol  - Monitor potassium Daily  - Patient's hypokalemia is stable  - Likely 2/2 albuterol treatments. Continue to monitor.    Essential hypertension  Patients blood pressure range in the last 24 hours was: BP  Min: 109/68  Max: 150/89.The patient's inpatient anti-hypertensive regimen is listed below:  Current Antihypertensives  hydrALAZINE injection 5 mg, Every 6 hours PRN, Intravenous  furosemide injection 40 mg, Daily, Intravenous  amLODIPine tablet 10 mg, Daily, Oral  isosorbide mononitrate 24 hr tablet 30 mg, Daily, Oral  metoprolol succinate (TOPROL-XL) 24 hr tablet 50 mg, Daily, Oral  nitroGLYCERIN SL tablet 0.4 mg, Every 5 min PRN, Sublingual    Plan  - BP is controlled, no changes needed to their regimen  - Diet adjusted    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 Continue to monitor on telemetry. Troponin negative x1 and will f/u on repeat troponin levels. Monitor on Tele. NO c/o chest pain at this time.     Interstitial lung disease exacerbation  F/u with pulmonary consult. Cont. Steroids, IV lasix, IV abx and nebs per pulmonary recs. Cont. MMF 500mg PO BID for now.     Results for orders placed during the hospital encounter of 10/17/24    Echo    Interpretation Summary    Left Ventricle: Regional wall motion abnormalities present. There is normal systolic function with a visually estimated ejection fraction of 55 %. Grade I diastolic dysfunction. federico septal hypokinesis    Right Ventricle: Normal right ventricular cavity size. Systolic function is normal.    Left Atrium: Left atrium is mildly dilated.    IVC/SVC: Normal venous pressure at 3 mmHg.    Pulmonology consulted:   Continue diuresis as tolerated  Solu-Medrol q.day  CellCept 1000mg BID (monitor closely for GI symptoms)  Titrate O2 to maintain saturations >90%  Continue budesonide, arformoterol nebulizer  Xopenex prn    Type 2 diabetes mellitus without complication, without long-term current use of insulin  Patient's FSGs are controlled on current medication regimen.  Last A1c reviewed-   Lab Results   Component Value Date    HGBA1C 8.0 (H) 09/25/2024     Most recent fingerstick glucose reviewed-   Recent Labs   Lab 11/08/24  1419 11/08/24  1623 11/08/24 2005 11/09/24  0735   POCTGLUCOSE 393* 357* 333* 225*     Current correctional scale  Moderate  Maintain anti-hyperglycemic dose as follows-   Antihyperglycemics (From admission, onward)      Start     Stop Route Frequency Ordered    11/09/24 0900  insulin glargine U-100 (Lantus) pen 15 Units         -- SubQ Daily 11/09/24 0753    11/06/24 1529  insulin aspart U-100 pen 0-10 Units         -- SubQ Before meals & nightly PRN  11/06/24 1430          Hold Oral hypoglycemics while patient is in the hospital. She takes Amaryl 2mg PO BID and Metformin 1gm PO BID       VTE Risk Mitigation (From admission, onward)           Ordered     enoxaparin injection 40 mg  Daily         11/05/24 1726     IP VTE HIGH RISK PATIENT  Once         11/05/24 1726     Place sequential compression device  Until discontinued         11/05/24 1726                    Discharge Planning   DERIC:      Code Status: Full Code   Is the patient medically ready for discharge?:     Reason for patient still in hospital (select all that apply): Treatment, Consult recommendations  Discharge Plan A: Home                  Umang Miller PA-C  Department of Hospital Medicine   [facility] [place] - Med Surg

## 2024-11-09 NOTE — PROGRESS NOTES
[facility] Medicine  Progress Note    Patient Name: Emmanuel Grant  MRN: [id number]  Patient Class: IP- Inpatient   Admission Date: 11/5/2024  Length of Stay: 3 days  Attending Physician: Kristian Han MD  Primary Care Provider: Wilfredo De Souza MD        Subjective:     Principal Problem:Acute on chronic hypoxic respiratory failure        HPI:  73 y.o. man with h/o NIDDM type 2 (A1c 8 in September 2024), CAD (with h/o stent), Essential HTN, h/o CVA, ILD complicated by chronic hypoxic respiratory failure (on 2L NC at home) and recently treated with MMF 500mg PO BID (started 9/2024) presents to the ER with worsening SOB and cough. COVID/Flu negative. Finished a round of Azithromycin. NO c/o Fevers or chills. NO N/V/Diarrhea. No chest pain. States can't stop sneezing or coughing.  [place] ED noted Sats upper 80s on his home 2L NC. D/w pulmonary on call Dr. Pérez who recommended transfer to [facility] and to start steroids, Levaquin 750mg IV qday and scheduled/PRN Duonebs.   Labs with normal WBC and stable H/H. Lytes unremarkable and renal function stable.  Glucose 147. VBG 7.3. D-Dimer normal and BNP normal Troponin negative x1 with NO STEMI on EKG.     CXR with:   Monitoring EKG leads are present. The trachea is unremarkable. The cardiomediastinal silhouette is stable. There is no evidence of free air beneath the hemidiaphragms. There are no pleural effusions. There is no evidence of a pneumothorax. There is no evidence of pneumomediastinum. There is increase in the appearance of diffuse bilateral airspace opacities. There are degenerative changes in the osseous structures.             Overview/Hospital Course:  Patient admitted for acute on chronic respiratory failure. Evaluated by Pulmonology who recommended continued diuresis as tolerated, Solu-Medrol, increasing CellCept dose and continous titration of O2 to maintain saturations >90%. Patient euvolemic on exam and without signs of infection  throughout admission. Antibiotics continued given patient's history of ILD and immunosuppressive therapy. Patient reported gradual improvement of SOB with treatment initiated therapy recommended by Pulmonology. He is now able to ambulate to bathroom without worsening of symptoms. Adequate saturations on home 2L.     Interval History: Patient seen and examined. Reports improvement of SOB. Able to ambulate to bathroom without BOSTON. Satting 98% on home 2L. Mild crackles noted on exam. Seems to be improving. Mild delirium noted overnight, however, patient oriented to self, location, and situation on AM rounds.    Review of Systems   Constitutional:  Negative for chills, fatigue and fever.   Respiratory:  Positive for cough and shortness of breath.    Cardiovascular:  Negative for chest pain, palpitations and leg swelling.   Gastrointestinal:  Negative for abdominal pain, constipation, diarrhea, nausea and vomiting.     Objective:     Vital Signs (Most Recent):  Temp: 97.8 °F (36.6 °C) (11/09/24 1040)  Pulse: 70 (11/09/24 1040)  Resp: 20 (11/09/24 1040)  BP: 120/78 (11/09/24 1040)  SpO2: 100 % (11/09/24 1040) Vital Signs (24h Range):  Temp:  [97.5 °F (36.4 °C)-98.1 °F (36.7 °C)] 97.8 °F (36.6 °C)  Pulse:  [58-97] 70  Resp:  [14-20] 20  SpO2:  [89 %-100 %] 100 %  BP: (109-136)/(68-90) 120/78     Weight: 68 kg (150 lb)  Body mass index is 22.81 kg/m².    Intake/Output Summary (Last 24 hours) at 11/9/2024 1046  Last data filed at 11/9/2024 0928  Gross per 24 hour   Intake 480 ml   Output 1650 ml   Net -1170 ml         Physical Exam  Vitals and nursing note reviewed.   HENT:      Head: Normocephalic.      Mouth/Throat:      Mouth: Mucous membranes are moist.      Pharynx: Oropharynx is clear.   Eyes:      Conjunctiva/sclera: Conjunctivae normal.   Cardiovascular:      Rate and Rhythm: Normal rate and regular rhythm.   Pulmonary:      Effort: No respiratory distress.      Comments: Mild crackles & wheezing. On home 2L NC.    Abdominal:      General: Bowel sounds are normal.      Palpations: Abdomen is soft.   Musculoskeletal:      Right lower leg: No edema.      Left lower leg: No edema.   Neurological:      Mental Status: He is alert and oriented to person, place, and time.             Significant Labs: All pertinent labs within the past 24 hours have been reviewed.    Significant Imaging: I have reviewed all pertinent imaging results/findings within the past 24 hours.    Assessment/Plan:      * Acute on chronic hypoxic respiratory failure  Patient with Hypercapnic and Hypoxic Respiratory failure which is Acute on chronic.  he is on home oxygen at 2 LPM. Supplemental oxygen was provided and noted- Oxygen Concentration (%):  [50] 50    .   Signs/symptoms of respiratory failure include- increased work of breathing and cough . Contributing diagnoses includes - Interstitial lung disease Labs and images were reviewed.     F/u with pulmonary consult.     Hypokalemia  Patient's most recent potassium results are listed below.   Recent Labs     11/06/24  0608 11/08/24  0428   K 3.6 3.0*     Plan  - Replete potassium per protocol  - Monitor potassium Daily  - Patient's hypokalemia is stable  - Likely 2/2 albuterol treatments. Continue to monitor.    Essential hypertension  Patients blood pressure range in the last 24 hours was: BP  Min: 116/83  Max: 150/89.The patient's inpatient anti-hypertensive regimen is listed below:  Current Antihypertensives  hydrALAZINE injection 5 mg, Every 6 hours PRN, Intravenous  furosemide injection 40 mg, Daily, Intravenous  amLODIPine tablet 10 mg, Daily, Oral  isosorbide mononitrate 24 hr tablet 30 mg, Daily, Oral  metoprolol succinate (TOPROL-XL) 24 hr tablet 50 mg, Daily, Oral  nitroGLYCERIN SL tablet 0.4 mg, Every 5 min PRN, Sublingual    Plan  - BP is controlled, no changes needed to their regimen  - Diet adjusted    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 Continue to monitor on telemetry. Troponin negative x1 and will f/u on repeat troponin levels. Monitor on Tele. NO c/o chest pain at this time.     Interstitial lung disease exacerbation  F/u with pulmonary consult. Cont. Steroids, IV lasix, IV abx and nebs per pulmonary recs. Cont. MMF 500mg PO BID for now.     Results for orders placed during the hospital encounter of 10/17/24    Echo    Interpretation Summary    Left Ventricle: Regional wall motion abnormalities present. There is normal systolic function with a visually estimated ejection fraction of 55 %. Grade I diastolic dysfunction. federico septal hypokinesis    Right Ventricle: Normal right ventricular cavity size. Systolic function is normal.    Left Atrium: Left atrium is mildly dilated.    IVC/SVC: Normal venous pressure at 3 mmHg.    Pulmonology consulted:   Continue diuresis as tolerated  Solu-Medrol q.day  CellCept 1000mg BID (monitor closely for GI symptoms)  Titrate O2 to maintain saturations >90%  Continue budesonide, arformoterol nebulizer  Xopenex prn    Type 2 diabetes mellitus without complication, without long-term current use of insulin  Patient's FSGs are controlled on current medication regimen.  Last A1c reviewed-   Lab Results   Component Value Date    HGBA1C 8.0 (H) 09/25/2024     Most recent fingerstick glucose reviewed-   Recent Labs   Lab 11/07/24  1640 11/07/24  1841 11/07/24  1943 11/08/24  0747   POCTGLUCOSE 426* 332* 320* 286*       Current correctional scale  Moderate  Maintain anti-hyperglycemic dose as follows-   Antihyperglycemics (From admission, onward)      Start     Stop Route Frequency Ordered    11/08/24 0900  insulin glargine U-100 (Lantus) pen 13 Units         -- SubQ Daily 11/08/24 0741    11/06/24 1529  insulin aspart U-100 pen 0-10 Units         -- SubQ Before meals & nightly PRN  11/06/24 1430          Hold Oral hypoglycemics while patient is in the hospital. She takes Amaryl 2mg PO BID and Metformin 1gm PO BID       VTE Risk Mitigation (From admission, onward)           Ordered     enoxaparin injection 40 mg  Daily         11/05/24 1726     IP VTE HIGH RISK PATIENT  Once         11/05/24 1726     Place sequential compression device  Until discontinued         11/05/24 1726                    Discharge Planning   DERIC:      Code Status: Full Code   Is the patient medically ready for discharge?:     Reason for patient still in hospital (select all that apply): Treatment and Consult recommendations  Discharge Plan A: Home                  Umang Miller PA-C  Department of Hospital Medicine   [facility] [place]

## 2024-11-09 NOTE — ASSESSMENT & PLAN NOTE
Patient's FSGs are controlled on current medication regimen.  Last A1c reviewed-   Lab Results   Component Value Date    HGBA1C 8.0 (H) 09/25/2024     Most recent fingerstick glucose reviewed-   Recent Labs   Lab 11/08/24  1419 11/08/24  1623 11/08/24 2005 11/09/24  0735   POCTGLUCOSE 393* 357* 333* 225*     Current correctional scale  Moderate  Maintain anti-hyperglycemic dose as follows-   Antihyperglycemics (From admission, onward)      Start     Stop Route Frequency Ordered    11/09/24 0900  insulin glargine U-100 (Lantus) pen 15 Units         -- SubQ Daily 11/09/24 0753    11/06/24 1529  insulin aspart U-100 pen 0-10 Units         -- SubQ Before meals & nightly PRN 11/06/24 1430          Hold Oral hypoglycemics while patient is in the hospital. She takes Amaryl 2mg PO BID and Metformin 1gm PO BID

## 2024-11-09 NOTE — ASSESSMENT & PLAN NOTE
Patient's most recent potassium results are listed below.   Recent Labs     11/08/24  0428 11/08/24  1219 11/09/24  0453   K 3.0* 3.6 3.2*       Plan  - Replete potassium per protocol  - Monitor potassium Daily  - Patient's hypokalemia is stable  - Likely 2/2 albuterol treatments. Continue to monitor.

## 2024-11-09 NOTE — PLAN OF CARE
Mr. Grant was sleeping comfortably at the time of my visit.  SpO2 is >96% on nasal cannula oxygen at 2 lpm (baseline at home).    Should be okay to switch from iv steroids to prednisone 60 mg daily with taper off over several weeks.  He will need Pulmonary Clinic follow up with Dr. Schmitt after discharge.    Marc Olivo MD  Pulmonary/Critical Care Medicine

## 2024-11-10 LAB
ALBUMIN SERPL BCP-MCNC: 3.1 G/DL (ref 3.5–5.2)
ALP SERPL-CCNC: 48 U/L (ref 40–150)
ALT SERPL W/O P-5'-P-CCNC: 21 U/L (ref 10–44)
ANION GAP SERPL CALC-SCNC: 10 MMOL/L (ref 8–16)
AST SERPL-CCNC: 14 U/L (ref 10–40)
BACTERIA BLD CULT: NORMAL
BACTERIA BLD CULT: NORMAL
BASOPHILS # BLD AUTO: 0 K/UL (ref 0–0.2)
BASOPHILS NFR BLD: 0 % (ref 0–1.9)
BILIRUB SERPL-MCNC: 0.7 MG/DL (ref 0.1–1)
BUN SERPL-MCNC: 20 MG/DL (ref 8–23)
CALCIUM SERPL-MCNC: 9.2 MG/DL (ref 8.7–10.5)
CHLORIDE SERPL-SCNC: 100 MMOL/L (ref 95–110)
CO2 SERPL-SCNC: 30 MMOL/L (ref 23–29)
CREAT SERPL-MCNC: 0.9 MG/DL (ref 0.5–1.4)
DIFFERENTIAL METHOD BLD: ABNORMAL
EOSINOPHIL # BLD AUTO: 0 K/UL (ref 0–0.5)
EOSINOPHIL NFR BLD: 0.1 % (ref 0–8)
ERYTHROCYTE [DISTWIDTH] IN BLOOD BY AUTOMATED COUNT: 13.6 % (ref 11.5–14.5)
EST. GFR  (NO RACE VARIABLE): >60 ML/MIN/1.73 M^2
GLUCOSE SERPL-MCNC: 191 MG/DL (ref 70–110)
HCT VFR BLD AUTO: 39.1 % (ref 40–54)
HGB BLD-MCNC: 12.9 G/DL (ref 14–18)
IMM GRANULOCYTES # BLD AUTO: 0.03 K/UL (ref 0–0.04)
IMM GRANULOCYTES NFR BLD AUTO: 0.4 % (ref 0–0.5)
LYMPHOCYTES # BLD AUTO: 0.9 K/UL (ref 1–4.8)
LYMPHOCYTES NFR BLD: 11.4 % (ref 18–48)
MCH RBC QN AUTO: 27.4 PG (ref 27–31)
MCHC RBC AUTO-ENTMCNC: 33 G/DL (ref 32–36)
MCV RBC AUTO: 83 FL (ref 82–98)
MONOCYTES # BLD AUTO: 0.7 K/UL (ref 0.3–1)
MONOCYTES NFR BLD: 9.3 % (ref 4–15)
NEUTROPHILS # BLD AUTO: 6.3 K/UL (ref 1.8–7.7)
NEUTROPHILS NFR BLD: 78.8 % (ref 38–73)
NRBC BLD-RTO: 0 /100 WBC
PLATELET # BLD AUTO: 178 K/UL (ref 150–450)
PMV BLD AUTO: 9.9 FL (ref 9.2–12.9)
POCT GLUCOSE: 190 MG/DL (ref 70–110)
POCT GLUCOSE: 289 MG/DL (ref 70–110)
POCT GLUCOSE: 320 MG/DL (ref 70–110)
POCT GLUCOSE: 354 MG/DL (ref 70–110)
POCT GLUCOSE: 403 MG/DL (ref 70–110)
POTASSIUM SERPL-SCNC: 3.3 MMOL/L (ref 3.5–5.1)
PROT SERPL-MCNC: 6.3 G/DL (ref 6–8.4)
RBC # BLD AUTO: 4.7 M/UL (ref 4.6–6.2)
SODIUM SERPL-SCNC: 140 MMOL/L (ref 136–145)
WBC # BLD AUTO: 7.97 K/UL (ref 3.9–12.7)

## 2024-11-10 PROCEDURE — 63600175 PHARM REV CODE 636 W HCPCS: Mod: HCNC

## 2024-11-10 PROCEDURE — 36415 COLL VENOUS BLD VENIPUNCTURE: CPT | Mod: HCNC

## 2024-11-10 PROCEDURE — 63600175 PHARM REV CODE 636 W HCPCS: Mod: HCNC | Performed by: INTERNAL MEDICINE

## 2024-11-10 PROCEDURE — 25000242 PHARM REV CODE 250 ALT 637 W/ HCPCS: Mod: HCNC

## 2024-11-10 PROCEDURE — 25000003 PHARM REV CODE 250: Mod: HCNC | Performed by: INTERNAL MEDICINE

## 2024-11-10 PROCEDURE — 94640 AIRWAY INHALATION TREATMENT: CPT | Mod: HCNC

## 2024-11-10 PROCEDURE — 99900035 HC TECH TIME PER 15 MIN (STAT): Mod: HCNC

## 2024-11-10 PROCEDURE — 85025 COMPLETE CBC W/AUTO DIFF WBC: CPT | Mod: HCNC

## 2024-11-10 PROCEDURE — 99232 SBSQ HOSP IP/OBS MODERATE 35: CPT | Mod: HCNC,,, | Performed by: INTERNAL MEDICINE

## 2024-11-10 PROCEDURE — 25000003 PHARM REV CODE 250: Mod: HCNC

## 2024-11-10 PROCEDURE — 27000221 HC OXYGEN, UP TO 24 HOURS: Mod: HCNC

## 2024-11-10 PROCEDURE — 21400001 HC TELEMETRY ROOM: Mod: HCNC

## 2024-11-10 PROCEDURE — 94761 N-INVAS EAR/PLS OXIMETRY MLT: CPT | Mod: HCNC

## 2024-11-10 PROCEDURE — 80053 COMPREHEN METABOLIC PANEL: CPT | Mod: HCNC

## 2024-11-10 PROCEDURE — 25000242 PHARM REV CODE 250 ALT 637 W/ HCPCS: Mod: HCNC | Performed by: INTERNAL MEDICINE

## 2024-11-10 RX ADMIN — SACUBITRIL AND VALSARTAN 1 TABLET: 24; 26 TABLET, FILM COATED ORAL at 08:11

## 2024-11-10 RX ADMIN — MELATONIN TAB 3 MG 6 MG: 3 TAB at 09:11

## 2024-11-10 RX ADMIN — BUDESONIDE 1 MG: 0.5 INHALANT RESPIRATORY (INHALATION) at 08:11

## 2024-11-10 RX ADMIN — SUCRALFATE 1 G: 1 TABLET ORAL at 11:11

## 2024-11-10 RX ADMIN — SUCRALFATE 1 G: 1 TABLET ORAL at 09:11

## 2024-11-10 RX ADMIN — BUDESONIDE 1 MG: 0.5 INHALANT RESPIRATORY (INHALATION) at 07:11

## 2024-11-10 RX ADMIN — FUROSEMIDE 40 MG: 10 INJECTION, SOLUTION INTRAMUSCULAR; INTRAVENOUS at 08:11

## 2024-11-10 RX ADMIN — LEVALBUTEROL HYDROCHLORIDE 0.63 MG: 0.63 SOLUTION RESPIRATORY (INHALATION) at 02:11

## 2024-11-10 RX ADMIN — ISOSORBIDE MONONITRATE 30 MG: 30 TABLET, EXTENDED RELEASE ORAL at 08:11

## 2024-11-10 RX ADMIN — ATORVASTATIN CALCIUM 40 MG: 40 TABLET, FILM COATED ORAL at 09:11

## 2024-11-10 RX ADMIN — POTASSIUM BICARBONATE 25 MEQ: 977.5 TABLET, EFFERVESCENT ORAL at 09:11

## 2024-11-10 RX ADMIN — INSULIN ASPART 10 UNITS: 100 INJECTION, SOLUTION INTRAVENOUS; SUBCUTANEOUS at 04:11

## 2024-11-10 RX ADMIN — MYCOPHENOLATE MOFETIL 1000 MG: 250 CAPSULE ORAL at 08:11

## 2024-11-10 RX ADMIN — INSULIN ASPART 2 UNITS: 100 INJECTION, SOLUTION INTRAVENOUS; SUBCUTANEOUS at 08:11

## 2024-11-10 RX ADMIN — ARFORMOTEROL TARTRATE 15 MCG: 15 SOLUTION RESPIRATORY (INHALATION) at 07:11

## 2024-11-10 RX ADMIN — ARFORMOTEROL TARTRATE 15 MCG: 15 SOLUTION RESPIRATORY (INHALATION) at 08:11

## 2024-11-10 RX ADMIN — INSULIN ASPART 5 UNITS: 100 INJECTION, SOLUTION INTRAVENOUS; SUBCUTANEOUS at 09:11

## 2024-11-10 RX ADMIN — LEVALBUTEROL HYDROCHLORIDE 0.63 MG: 0.63 SOLUTION RESPIRATORY (INHALATION) at 07:11

## 2024-11-10 RX ADMIN — LEVALBUTEROL HYDROCHLORIDE 0.63 MG: 0.63 SOLUTION RESPIRATORY (INHALATION) at 08:11

## 2024-11-10 RX ADMIN — SUCRALFATE 1 G: 1 TABLET ORAL at 06:11

## 2024-11-10 RX ADMIN — INSULIN GLARGINE 15 UNITS: 100 INJECTION, SOLUTION SUBCUTANEOUS at 08:11

## 2024-11-10 RX ADMIN — PREDNISONE 60 MG: 20 TABLET ORAL at 08:11

## 2024-11-10 RX ADMIN — FAMOTIDINE 40 MG: 20 TABLET ORAL at 08:11

## 2024-11-10 RX ADMIN — METOPROLOL SUCCINATE 50 MG: 50 TABLET, EXTENDED RELEASE ORAL at 08:11

## 2024-11-10 RX ADMIN — DIPHENHYDRAMINE HYDROCHLORIDE 25 MG: 25 CAPSULE ORAL at 09:11

## 2024-11-10 RX ADMIN — AMLODIPINE BESYLATE 10 MG: 5 TABLET ORAL at 08:11

## 2024-11-10 RX ADMIN — LEVOFLOXACIN 750 MG: 5 INJECTION, SOLUTION INTRAVENOUS at 04:11

## 2024-11-10 RX ADMIN — SUCRALFATE 1 G: 1 TABLET ORAL at 04:11

## 2024-11-10 RX ADMIN — INSULIN ASPART 6 UNITS: 100 INJECTION, SOLUTION INTRAVENOUS; SUBCUTANEOUS at 11:11

## 2024-11-10 RX ADMIN — ENOXAPARIN SODIUM 40 MG: 40 INJECTION SUBCUTANEOUS at 04:11

## 2024-11-10 RX ADMIN — ASPIRIN 81 MG CHEWABLE TABLET 81 MG: 81 TABLET CHEWABLE at 08:11

## 2024-11-10 RX ADMIN — TAMSULOSIN HYDROCHLORIDE 0.4 MG: 0.4 CAPSULE ORAL at 08:11

## 2024-11-10 RX ADMIN — MYCOPHENOLATE MOFETIL 1000 MG: 250 CAPSULE ORAL at 09:11

## 2024-11-10 NOTE — NURSING
[facility]  Nurses Note -- 4 Eyes      11/9/2024       Skin assessed on: Q Shift      [x] No Pressure Injuries Present    [x]Prevention Measures Documented    [] Yes LDA  for Pressure Injury Previously documented     [] Yes New Pressure Injury Discovered   [] LDA for New Pressure Injury Added      Attending RN:  Emi Mann RN     Second RN:  KANDI Bliss

## 2024-11-10 NOTE — ASSESSMENT & PLAN NOTE
Patient's FSGs are controlled on current medication regimen.  Last A1c reviewed-   Lab Results   Component Value Date    HGBA1C 8.0 (H) 09/25/2024     Most recent fingerstick glucose reviewed-   Recent Labs   Lab 11/09/24  1038 11/09/24  1600 11/09/24  2017 11/10/24  0726   POCTGLUCOSE 302* 378* 384* 190*       Current correctional scale  Moderate  Maintain anti-hyperglycemic dose as follows-   Antihyperglycemics (From admission, onward)    Start     Stop Route Frequency Ordered    11/09/24 0900  insulin glargine U-100 (Lantus) pen 15 Units         -- SubQ Daily 11/09/24 0753    11/06/24 1529  insulin aspart U-100 pen 0-10 Units         -- SubQ Before meals & nightly PRN 11/06/24 1430        Hold Oral hypoglycemics while patient is in the hospital. She takes Amaryl 2mg PO BID and Metformin 1gm PO BID

## 2024-11-10 NOTE — SUBJECTIVE & OBJECTIVE
Interval History:     11/10:  Stable on nasal cannula at 2 lpm.  Breathing comfortably in no distress -- not dyspneic during conversation on rounds.  Steroids switched from IV to PO prednisone today.  Should be ready for hospital discharge soon.    Objective:     Vital Signs (Most Recent):  Temp: 98 °F (36.7 °C) (11/10/24 1106)  Pulse: 78 (11/10/24 1108)  Resp: 18 (11/10/24 1106)  BP: 107/72 (11/10/24 1106)  SpO2: 99 % (11/10/24 1106) Vital Signs (24h Range):  Temp:  [97.7 °F (36.5 °C)-98.7 °F (37.1 °C)] 98 °F (36.7 °C)  Pulse:  [66-90] 78  Resp:  [15-20] 18  SpO2:  [95 %-100 %] 99 %  BP: (105-149)/(66-82) 107/72     Weight: 68 kg (150 lb)  Body mass index is 22.81 kg/m².      Intake/Output Summary (Last 24 hours) at 11/10/2024 1150  Last data filed at 11/10/2024 0817  Gross per 24 hour   Intake 480 ml   Output --   Net 480 ml        Physical Exam  Vitals and nursing note reviewed.   Constitutional:       General: He is not in acute distress.     Appearance: He is not toxic-appearing.   Cardiovascular:      Rate and Rhythm: Normal rate and regular rhythm.      Heart sounds: Normal heart sounds. No murmur heard.     No gallop.   Pulmonary:      Effort: No respiratory distress.      Breath sounds: Rales present. No wheezing or rhonchi.   Neurological:      General: No focal deficit present.      Mental Status: He is alert and oriented to person, place, and time. Mental status is at baseline.           Review of Systems    Vents:  Oxygen Concentration (%): 28 (11/08/24 0846)    Lines/Drains/Airways       Peripheral Intravenous Line  Duration                  Peripheral IV - Single Lumen 11/07/24 0942 18 G Anterior;Distal;Left Antecubital 3 days                    Significant Labs:    CBC/Anemia Profile:  Recent Labs   Lab 11/09/24  0453 11/10/24  0432   WBC 9.14 7.97   HGB 13.6* 12.9*   HCT 40.1 39.1*    178   MCV 84 83   RDW 13.8 13.6        Chemistries:  Recent Labs   Lab 11/08/24  1219 11/09/24  9887  11/10/24  0432    140 140   K 3.6 3.2* 3.3*   CL 99 101 100   CO2 28 29 30*   BUN 24* 25* 20   CREATININE 1.1 1.0 0.9   CALCIUM 9.2 9.2 9.2   ALBUMIN  --  3.1* 3.1*   PROT  --  6.3 6.3   BILITOT  --  0.6 0.7   ALKPHOS  --  49 48   ALT  --  16 21   AST  --  12 14   MG 2.1  --   --        All pertinent labs within the past 24 hours have been reviewed.    Significant Imaging:  I have reviewed all pertinent imaging results/findings within the past 24 hours.

## 2024-11-10 NOTE — PLAN OF CARE
Problem: Adult Inpatient Plan of Care  Goal: Plan of Care Review  Outcome: Progressing  Flowsheets (Taken 11/10/2024 0541)  Plan of Care Reviewed With: patient  Goal: Optimal Comfort and Wellbeing  Outcome: Progressing  Intervention: Monitor Pain and Promote Comfort  Flowsheets (Taken 11/10/2024 0541)  Pain Management Interventions:   quiet environment facilitated   pillow support provided     Problem: COPD (Chronic Obstructive Pulmonary Disease)  Goal: Optimal Chronic Illness Coping  Outcome: Progressing  Goal: Optimal Level of Functional [place]  Outcome: Progressing

## 2024-11-10 NOTE — HOSPITAL COURSE
11/10:  Stable on nasal cannula at 2 lpm.  Breathing comfortably in no distress -- not dyspneic during conversation on rounds.  Steroids switched from IV to PO prednisone today.  Should be ready for hospital discharge soon.

## 2024-11-10 NOTE — PROGRESS NOTES
[facility] Medicine  Progress Note    Patient Name: Emmanuel Grant  MRN: [id number]  Patient Class: IP- Inpatient   Admission Date: 11/5/2024  Length of Stay: 4 days  Attending Physician: Kristian Han MD  Primary Care Provider: Wilfredo De Souza MD        Subjective:     Principal Problem:Acute on chronic hypoxic respiratory failure        HPI:  73 y.o. man with h/o NIDDM type 2 (A1c 8 in September 2024), CAD (with h/o stent), Essential HTN, h/o CVA, ILD complicated by chronic hypoxic respiratory failure (on 2L NC at home) and recently treated with MMF 500mg PO BID (started 9/2024) presents to the ER with worsening SOB and cough. COVID/Flu negative. Finished a round of Azithromycin. NO c/o Fevers or chills. NO N/V/Diarrhea. No chest pain. States can't stop sneezing or coughing.  [facility] noted Sats upper 80s on his home 2L NC. D/w pulmonary on call Dr. Pérez who recommended transfer to [facility] and to start steroids, Levaquin 750mg IV qday and scheduled/PRN Duonebs.   Labs with normal WBC and stable H/H. Lytes unremarkable and renal function stable.  Glucose 147. VBG 7.3. D-Dimer normal and BNP normal Troponin negative x1 with NO STEMI on EKG.     CXR with:   Monitoring EKG leads are present. The trachea is unremarkable. The cardiomediastinal silhouette is stable. There is no evidence of free air beneath the hemidiaphragms. There are no pleural effusions. There is no evidence of a pneumothorax. There is no evidence of pneumomediastinum. There is increase in the appearance of diffuse bilateral airspace opacities. There are degenerative changes in the osseous structures.             Overview/Hospital Course:  Patient admitted for acute on chronic respiratory failure. Evaluated by Pulmonology who recommended continued diuresis as tolerated, Solu-Medrol, increasing CellCept dose and continous titration of O2 to maintain saturations >90%. Patient euvolemic on exam and without signs of infection  throughout admission. Antibiotics continued given patient's history of ILD and immunosuppressive therapy. Patient reported gradual improvement of SOB with treatment initiated therapy recommended by Pulmonology. He is now able to ambulate to bathroom without worsening of symptoms. Adequate saturations on home 2L.     Interval History: Patient seen and examined. Reports improvement of SOB. Ambulating around hospital room without B[place]. Satting 100% on home 2L. Mild crackles noted on exam, however, notable improvement from clinical status on admission. No abdominal pain.     Review of Systems   Constitutional:  Negative for chills, fatigue and fever.   Respiratory:  Positive for shortness of breath (improving).    Cardiovascular:  Negative for chest pain, palpitations and leg swelling.   Gastrointestinal:  Negative for abdominal pain, constipation, diarrhea, nausea and vomiting.     Objective:     Vital Signs (Most Recent):  Temp: 97.8 °F (36.6 °C) (11/09/24 1040)  Pulse: 70 (11/09/24 1040)  Resp: 20 (11/09/24 1040)  BP: 120/78 (11/09/24 1040)  SpO2: 100 % (11/09/24 1040) Vital Signs (24h Range):  Temp:  [97.5 °F (36.4 °C)-98.1 °F (36.7 °C)] 97.8 °F (36.6 °C)  Pulse:  [58-97] 70  Resp:  [14-20] 20  SpO2:  [89 %-100 %] 100 %  BP: (109-136)/(68-90) 120/78     Weight: 68 kg (150 lb)  Body mass index is 22.81 kg/m².    Intake/Output Summary (Last 24 hours) at 11/9/2024 1046  Last data filed at 11/9/2024 0928  Gross per 24 hour   Intake 480 ml   Output 1650 ml   Net -1170 ml         Physical Exam  Vitals and nursing note reviewed.   HENT:      Head: Normocephalic.      Mouth/Throat:      Mouth: Mucous membranes are moist.      Pharynx: Oropharynx is clear.   Eyes:      Conjunctiva/sclera: Conjunctivae normal.   Cardiovascular:      Rate and Rhythm: Normal rate and regular rhythm.   Pulmonary:      Effort: No respiratory distress.      Comments: Mild crackles. On home 2L NC.   Abdominal:      General: Bowel sounds are normal.       Palpations: Abdomen is soft.   Musculoskeletal:      Right lower leg: No edema.      Left lower leg: No edema.   Neurological:      Mental Status: He is alert and oriented to person, place, and time.             Significant Labs: All pertinent labs within the past 24 hours have been reviewed.    Significant Imaging: I have reviewed all pertinent imaging results/findings within the past 24 hours.    Assessment/Plan:      * Acute on chronic hypoxic respiratory failure  Patient with Hypercapnic and Hypoxic Respiratory failure which is Acute on chronic.  he is on home oxygen at 2 LPM. Supplemental oxygen was provided and noted- Oxygen Concentration (%):  [50] 50    .   Signs/symptoms of respiratory failure include- increased work of breathing and cough . Contributing diagnoses includes - Interstitial lung disease Labs and images were reviewed.     F/u with pulmonary consult.     Hypokalemia  Patient's most recent potassium results are listed below.   Recent Labs     11/08/24  1219 11/09/24  0453 11/10/24  0432   K 3.6 3.2* 3.3*       Plan  - Replete potassium per protocol  - Monitor potassium Daily  - Patient's hypokalemia is stable  - Likely 2/2 albuterol treatments. Continue to monitor.    Essential hypertension  Patients blood pressure range in the last 24 hours was: BP  Min: 109/68  Max: 150/89.The patient's inpatient anti-hypertensive regimen is listed below:  Current Antihypertensives  hydrALAZINE injection 5 mg, Every 6 hours PRN, Intravenous  furosemide injection 40 mg, Daily, Intravenous  amLODIPine tablet 10 mg, Daily, Oral  isosorbide mononitrate 24 hr tablet 30 mg, Daily, Oral  metoprolol succinate (TOPROL-XL) 24 hr tablet 50 mg, Daily, Oral  nitroGLYCERIN SL tablet 0.4 mg, Every 5 min PRN, Sublingual    Plan  - BP is controlled, no changes needed to their regimen  - Diet adjusted    Coronary artery disease involving native coronary artery of native heart with angina pectoris  Patient with known CAD s/p  stent placement, which is controlled Will continue ASA and Statin and monitor for S/Sx of angina/ACS. Continue to monitor on telemetry. Troponin negative x1 and will f/u on repeat troponin levels. Monitor on Tele. NO c/o chest pain at this time.     Interstitial lung disease exacerbation  F/u with pulmonary consult. Cont. Steroids, IV lasix, IV abx and nebs per pulmonary recs. Cont. MMF 500mg PO BID for now.     Results for orders placed during the hospital encounter of 10/17/24    Echo    Interpretation Summary    Left Ventricle: Regional wall motion abnormalities present. There is normal systolic function with a visually estimated ejection fraction of 55 %. Grade I diastolic dysfunction. federico septal hypokinesis    Right Ventricle: Normal right ventricular cavity size. Systolic function is normal.    Left Atrium: Left atrium is mildly dilated.    IVC/SVC: Normal venous pressure at 3 mmHg.    Pulmonology consulted:   Continue diuresis as tolerated  Solu-Medrol q.day, transition to Prednisone 60mg q.day, taper on discharge  CellCept 1000mg BID (monitor closely for GI symptoms)  Titrate O2 to maintain saturations >90%  Continue budesonide, arformoterol nebulizer  Xopenex prn    Type 2 diabetes mellitus without complication, without long-term current use of insulin  Patient's FSGs are controlled on current medication regimen.  Last A1c reviewed-   Lab Results   Component Value Date    HGBA1C 8.0 (H) 09/25/2024     Most recent fingerstick glucose reviewed-   Recent Labs   Lab 11/09/24  1038 11/09/24  1600 11/09/24  2017 11/10/24  0726   POCTGLUCOSE 302* 378* 384* 190*       Current correctional scale  Moderate  Maintain anti-hyperglycemic dose as follows-   Antihyperglycemics (From admission, onward)      Start     Stop Route Frequency Ordered    11/09/24 0900  insulin glargine U-100 (Lantus) pen 15 Units         -- SubQ Daily 11/09/24 0753    11/06/24 1529  insulin aspart U-100 pen 0-10 Units         -- SubQ Before  meals & nightly PRN 11/06/24 1430          Hold Oral hypoglycemics while patient is in the hospital. She takes Amaryl 2mg PO BID and Metformin 1gm PO BID       VTE Risk Mitigation (From admission, onward)           Ordered     enoxaparin injection 40 mg  Daily         11/05/24 1726     IP VTE HIGH RISK PATIENT  Once         11/05/24 1726     Place sequential compression device  Until discontinued         11/05/24 1726                    Discharge Planning   DERIC:      Code Status: Full Code   Is the patient medically ready for discharge?:     Reason for patient still in hospital (select all that apply): Treatment, Consult recommendations  Discharge Plan A: Home                  Umang Miller PA-C  Department of Hospital Medicine   [facility]

## 2024-11-10 NOTE — ASSESSMENT & PLAN NOTE
F/u with pulmonary consult. Cont. Steroids, IV lasix, IV abx and nebs per pulmonary recs. Cont. MMF 500mg PO BID for now.     Results for orders placed during the hospital encounter of 10/17/24    Echo    Interpretation Summary    Left Ventricle: Regional wall motion abnormalities present. There is normal systolic function with a visually estimated ejection fraction of 55 %. Grade I diastolic dysfunction. federico septal hypokinesis    Right Ventricle: Normal right ventricular cavity size. Systolic function is normal.    Left Atrium: Left atrium is mildly dilated.    IVC/SVC: Normal venous pressure at 3 mmHg.    Pulmonology consulted:   Continue diuresis as tolerated  Solu-Medrol q.day, transition to Prednisone 60mg q.day, taper on discharge  CellCept 1000mg BID (monitor closely for GI symptoms)  Titrate O2 to maintain saturations >90%  Continue budesonide, arformoterol nebulizer  Xopenex prn

## 2024-11-10 NOTE — ASSESSMENT & PLAN NOTE
Patient's most recent potassium results are listed below.   Recent Labs     11/08/24  1219 11/09/24  0453 11/10/24  0432   K 3.6 3.2* 3.3*       Plan  - Replete potassium per protocol  - Monitor potassium Daily  - Patient's hypokalemia is stable  - Likely 2/2 albuterol treatments. Continue to monitor.

## 2024-11-10 NOTE — ASSESSMENT & PLAN NOTE
Patient likely with decompensation of his interstitial lung disease.  Hopefully this does not reflect a true ILD exacerbation as a mortality with this issue is quite high.  He has previously responded to increased doses of steroids.  He presented taking 500 mg b.i.d. of CellCept.  No evidence of fluid overload on his lower extremities.  - recommend diuresis as tolerated   - continue 1 milligram/kilogram Solu-Medrol q.day.  tomorrow would consider reducing to PO dosing.   - increase CellCept to 1000 mg b.i.d.  monitor closely for GI symptoms.  If tolerated for the next 2 weeks, advance to 1500mg BID.   - oxygen to target saturations of greater than 90%.    - PTOT   - He will need follow up with Pulmonary after discharge.  - continue budesonide and arformoterol nebulizer therapy.  continue p.r.n. Xopenex    11/10:  Switched to po prednisone today with plan for slow outpatient taper over several weeks.  Back to baseline home oxygen use at 2 lpm with adequate SpO2.    Would check walking SpO2 on oxygen prior to discharge to see if he may need to bump up oxygen from 2 lpm with exertion.

## 2024-11-10 NOTE — PROGRESS NOTES
[facility]  Pulmonology  Progress Note    Patient Name: Emmanuel Grant  MRN: [id number]  Admission Date: 11/5/2024  Hospital Length of Stay: 4 days  Code Status: Full Code  Attending Provider: Kristian Han MD  Primary Care Provider: Wilfredo De Souza MD   Principal Problem: Acute on chronic hypoxic respiratory failure    Subjective:     Interval History:     11/10:  Stable on nasal cannula at 2 lpm.  Breathing comfortably in no distress -- not dyspneic during conversation on rounds.  Steroids switched from IV to PO prednisone today.  Should be ready for hospital discharge soon.    Objective:     Vital Signs (Most Recent):  Temp: 98 °F (36.7 °C) (11/10/24 1106)  Pulse: 78 (11/10/24 1108)  Resp: 18 (11/10/24 1106)  BP: 107/72 (11/10/24 1106)  SpO2: 99 % (11/10/24 1106) Vital Signs (24h Range):  Temp:  [97.7 °F (36.5 °C)-98.7 °F (37.1 °C)] 98 °F (36.7 °C)  Pulse:  [66-90] 78  Resp:  [15-20] 18  SpO2:  [95 %-100 %] 99 %  BP: (105-149)/(66-82) 107/72     Weight: 68 kg (150 lb)  Body mass index is 22.81 kg/m².      Intake/Output Summary (Last 24 hours) at 11/10/2024 1150  Last data filed at 11/10/2024 0817  Gross per 24 hour   Intake 480 ml   Output --   Net 480 ml        Physical Exam  Vitals and nursing note reviewed.   Constitutional:       General: He is not in acute distress.     Appearance: He is not toxic-appearing.   Cardiovascular:      Rate and Rhythm: Normal rate and regular rhythm.      Heart sounds: Normal heart sounds. No murmur heard.     No gallop.   Pulmonary:      Effort: No respiratory distress.      Breath sounds: Rales present. No wheezing or rhonchi.   Neurological:      General: No focal deficit present.      Mental Status: He is alert and oriented to person, place, and time. Mental status is at baseline.           Review of Systems    Vents:  Oxygen Concentration (%): 28 (11/08/24 0846)    Lines/Drains/Airways       Peripheral Intravenous Line  Duration                  Peripheral IV -  Single Lumen 11/07/24 0942 18 G Anterior;Distal;Left Antecubital 3 days                    Significant Labs:    CBC/Anemia Profile:  Recent Labs   Lab 11/09/24  0453 11/10/24  0432   WBC 9.14 7.97   HGB 13.6* 12.9*   HCT 40.1 39.1*    178   MCV 84 83   RDW 13.8 13.6        Chemistries:  Recent Labs   Lab 11/08/24  1219 11/09/24  0453 11/10/24  0432    140 140   K 3.6 3.2* 3.3*   CL 99 101 100   CO2 28 29 30*   BUN 24* 25* 20   CREATININE 1.1 1.0 0.9   CALCIUM 9.2 9.2 9.2   ALBUMIN  --  3.1* 3.1*   PROT  --  6.3 6.3   BILITOT  --  0.6 0.7   ALKPHOS  --  49 48   ALT  --  16 21   AST  --  12 14   MG 2.1  --   --        All pertinent labs within the past 24 hours have been reviewed.    Significant Imaging:  I have reviewed all pertinent imaging results/findings within the past 24 hours.    ABG  Recent Labs   Lab 11/05/24  1856   PH 7.348*   PO2 25*   PCO2 43.4   HCO3 23.8*   BE -2     Assessment/Plan:     Pulmonary  Interstitial lung disease exacerbation  Patient likely with decompensation of his interstitial lung disease.  Hopefully this does not reflect a true ILD exacerbation as a mortality with this issue is quite high.  He has previously responded to increased doses of steroids.  He presented taking 500 mg b.i.d. of CellCept.  No evidence of fluid overload on his lower extremities.  - recommend diuresis as tolerated   - continue 1 milligram/kilogram Solu-Medrol q.day.  tomorrow would consider reducing to PO dosing.   - increase CellCept to 1000 mg b.i.d.  monitor closely for GI symptoms.  If tolerated for the next 2 weeks, advance to 1500mg BID.   - oxygen to target saturations of greater than 90%.    - PTOT   - He will need follow up with Pulmonary after discharge.  - continue budesonide and arformoterol nebulizer therapy.  continue p.r.n. Xopenex    11/10:  Switched to po prednisone today with plan for slow outpatient taper over several weeks.  Back to baseline home oxygen use at 2 lpm with adequate  SpO2.    Would check walking SpO2 on oxygen prior to discharge to see if he may need to bump up oxygen from 2 lpm with exertion.      Seems to be near his respiratory baseline.  He does need outpatient Pulmonary follow up to monitor his progress. I will contact Dr. Naheed Schmitt (his outpatient pulmonologist) to set up follow up.    Pulmonary to sign off.  Please call for questions as needed.     Marc Olivo MD  Pulmonology  [facility]

## 2024-11-11 ENCOUNTER — TELEPHONE (OUTPATIENT)
Dept: PULMONOLOGY | Facility: CLINIC | Age: 74
End: 2024-11-11
Payer: MEDICARE

## 2024-11-11 VITALS
SYSTOLIC BLOOD PRESSURE: 107 MMHG | OXYGEN SATURATION: 98 % | HEART RATE: 78 BPM | RESPIRATION RATE: 18 BRPM | DIASTOLIC BLOOD PRESSURE: 66 MMHG | HEIGHT: 68 IN | WEIGHT: 150 LBS | BODY MASS INDEX: 22.73 KG/M2 | TEMPERATURE: 98 F

## 2024-11-11 PROBLEM — E87.6 HYPOKALEMIA: Status: RESOLVED | Noted: 2024-11-08 | Resolved: 2024-11-11

## 2024-11-11 PROBLEM — J96.21 ACUTE ON CHRONIC HYPOXIC RESPIRATORY FAILURE: Status: RESOLVED | Noted: 2024-09-23 | Resolved: 2024-11-11

## 2024-11-11 LAB
ALBUMIN SERPL BCP-MCNC: 2.9 G/DL (ref 3.5–5.2)
ALP SERPL-CCNC: 38 U/L (ref 40–150)
ALT SERPL W/O P-5'-P-CCNC: 20 U/L (ref 10–44)
ANION GAP SERPL CALC-SCNC: 11 MMOL/L (ref 8–16)
AST SERPL-CCNC: 11 U/L (ref 10–40)
BASOPHILS # BLD AUTO: 0 K/UL (ref 0–0.2)
BASOPHILS NFR BLD: 0 % (ref 0–1.9)
BILIRUB SERPL-MCNC: 0.7 MG/DL (ref 0.1–1)
BUN SERPL-MCNC: 25 MG/DL (ref 8–23)
CALCIUM SERPL-MCNC: 8.9 MG/DL (ref 8.7–10.5)
CHLORIDE SERPL-SCNC: 101 MMOL/L (ref 95–110)
CO2 SERPL-SCNC: 29 MMOL/L (ref 23–29)
CREAT SERPL-MCNC: 1 MG/DL (ref 0.5–1.4)
DIFFERENTIAL METHOD BLD: ABNORMAL
EOSINOPHIL # BLD AUTO: 0.2 K/UL (ref 0–0.5)
EOSINOPHIL NFR BLD: 1.8 % (ref 0–8)
ERYTHROCYTE [DISTWIDTH] IN BLOOD BY AUTOMATED COUNT: 13.6 % (ref 11.5–14.5)
EST. GFR  (NO RACE VARIABLE): >60 ML/MIN/1.73 M^2
GLUCOSE SERPL-MCNC: 188 MG/DL (ref 70–110)
HCT VFR BLD AUTO: 38 % (ref 40–54)
HGB BLD-MCNC: 12.9 G/DL (ref 14–18)
IMM GRANULOCYTES # BLD AUTO: 0.03 K/UL (ref 0–0.04)
IMM GRANULOCYTES NFR BLD AUTO: 0.4 % (ref 0–0.5)
LYMPHOCYTES # BLD AUTO: 1.6 K/UL (ref 1–4.8)
LYMPHOCYTES NFR BLD: 18.8 % (ref 18–48)
MCH RBC QN AUTO: 28.5 PG (ref 27–31)
MCHC RBC AUTO-ENTMCNC: 33.9 G/DL (ref 32–36)
MCV RBC AUTO: 84 FL (ref 82–98)
MONOCYTES # BLD AUTO: 0.7 K/UL (ref 0.3–1)
MONOCYTES NFR BLD: 8.6 % (ref 4–15)
NEUTROPHILS # BLD AUTO: 5.8 K/UL (ref 1.8–7.7)
NEUTROPHILS NFR BLD: 70.4 % (ref 38–73)
NRBC BLD-RTO: 0 /100 WBC
PLATELET # BLD AUTO: 171 K/UL (ref 150–450)
PMV BLD AUTO: 10.5 FL (ref 9.2–12.9)
POCT GLUCOSE: 171 MG/DL (ref 70–110)
POCT GLUCOSE: 308 MG/DL (ref 70–110)
POTASSIUM SERPL-SCNC: 3.2 MMOL/L (ref 3.5–5.1)
PROT SERPL-MCNC: 5.7 G/DL (ref 6–8.4)
RBC # BLD AUTO: 4.53 M/UL (ref 4.6–6.2)
SODIUM SERPL-SCNC: 141 MMOL/L (ref 136–145)
WBC # BLD AUTO: 8.29 K/UL (ref 3.9–12.7)

## 2024-11-11 PROCEDURE — 63600175 PHARM REV CODE 636 W HCPCS: Mod: HCNC

## 2024-11-11 PROCEDURE — 25000003 PHARM REV CODE 250: Mod: HCNC | Performed by: INTERNAL MEDICINE

## 2024-11-11 PROCEDURE — 25000242 PHARM REV CODE 250 ALT 637 W/ HCPCS: Mod: HCNC | Performed by: INTERNAL MEDICINE

## 2024-11-11 PROCEDURE — 80053 COMPREHEN METABOLIC PANEL: CPT | Mod: HCNC

## 2024-11-11 PROCEDURE — 27000221 HC OXYGEN, UP TO 24 HOURS: Mod: HCNC

## 2024-11-11 PROCEDURE — 85025 COMPLETE CBC W/AUTO DIFF WBC: CPT | Mod: HCNC

## 2024-11-11 PROCEDURE — 25000242 PHARM REV CODE 250 ALT 637 W/ HCPCS: Mod: HCNC

## 2024-11-11 PROCEDURE — 63600175 PHARM REV CODE 636 W HCPCS: Mod: HCNC | Performed by: INTERNAL MEDICINE

## 2024-11-11 PROCEDURE — 36415 COLL VENOUS BLD VENIPUNCTURE: CPT | Mod: HCNC

## 2024-11-11 PROCEDURE — 25000003 PHARM REV CODE 250: Mod: HCNC

## 2024-11-11 PROCEDURE — 99900031 HC PATIENT EDUCATION (STAT): Mod: HCNC

## 2024-11-11 PROCEDURE — 94761 N-INVAS EAR/PLS OXIMETRY MLT: CPT | Mod: HCNC

## 2024-11-11 PROCEDURE — 94640 AIRWAY INHALATION TREATMENT: CPT | Mod: HCNC

## 2024-11-11 PROCEDURE — 94760 N-INVAS EAR/PLS OXIMETRY 1: CPT | Mod: HCNC

## 2024-11-11 RX ORDER — BUDESONIDE 0.5 MG/2ML
1 INHALANT ORAL 2 TIMES DAILY
Qty: 240 ML | Refills: 11 | Status: SHIPPED | OUTPATIENT
Start: 2024-11-11 | End: 2025-11-11

## 2024-11-11 RX ORDER — FAMOTIDINE 40 MG/1
40 TABLET, FILM COATED ORAL 2 TIMES DAILY
Qty: 60 TABLET | Refills: 1 | Status: SHIPPED | OUTPATIENT
Start: 2024-11-11 | End: 2025-01-10

## 2024-11-11 RX ORDER — PREDNISONE 20 MG/1
TABLET ORAL
Qty: 33 TABLET | Refills: 0 | Status: SHIPPED | OUTPATIENT
Start: 2024-11-12 | End: 2024-12-02

## 2024-11-11 RX ORDER — MYCOPHENOLATE MOFETIL 250 MG/1
1000 CAPSULE ORAL 2 TIMES DAILY
Qty: 240 CAPSULE | Refills: 11 | Status: SHIPPED | OUTPATIENT
Start: 2024-11-11 | End: 2025-11-11

## 2024-11-11 RX ORDER — ARFORMOTEROL TARTRATE 15 UG/2ML
15 SOLUTION RESPIRATORY (INHALATION) 2 TIMES DAILY
Qty: 120 ML | Refills: 11 | Status: SHIPPED | OUTPATIENT
Start: 2024-11-11 | End: 2025-11-11

## 2024-11-11 RX ORDER — POTASSIUM CHLORIDE 20 MEQ/1
40 TABLET, EXTENDED RELEASE ORAL DAILY
Qty: 6 TABLET | Refills: 0 | Status: SHIPPED | OUTPATIENT
Start: 2024-11-12 | End: 2024-11-15

## 2024-11-11 RX ADMIN — INSULIN ASPART 8 UNITS: 100 INJECTION, SOLUTION INTRAVENOUS; SUBCUTANEOUS at 02:11

## 2024-11-11 RX ADMIN — ISOSORBIDE MONONITRATE 30 MG: 30 TABLET, EXTENDED RELEASE ORAL at 08:11

## 2024-11-11 RX ADMIN — AMLODIPINE BESYLATE 10 MG: 5 TABLET ORAL at 08:11

## 2024-11-11 RX ADMIN — SUCRALFATE 1 G: 1 TABLET ORAL at 06:11

## 2024-11-11 RX ADMIN — ARFORMOTEROL TARTRATE 15 MCG: 15 SOLUTION RESPIRATORY (INHALATION) at 07:11

## 2024-11-11 RX ADMIN — INSULIN GLARGINE 15 UNITS: 100 INJECTION, SOLUTION SUBCUTANEOUS at 08:11

## 2024-11-11 RX ADMIN — MYCOPHENOLATE MOFETIL 1000 MG: 250 CAPSULE ORAL at 08:11

## 2024-11-11 RX ADMIN — SUCRALFATE 1 G: 1 TABLET ORAL at 04:11

## 2024-11-11 RX ADMIN — METOPROLOL SUCCINATE 50 MG: 50 TABLET, EXTENDED RELEASE ORAL at 08:11

## 2024-11-11 RX ADMIN — INSULIN ASPART 2 UNITS: 100 INJECTION, SOLUTION INTRAVENOUS; SUBCUTANEOUS at 08:11

## 2024-11-11 RX ADMIN — SACUBITRIL AND VALSARTAN 1 TABLET: 24; 26 TABLET, FILM COATED ORAL at 08:11

## 2024-11-11 RX ADMIN — ENOXAPARIN SODIUM 40 MG: 40 INJECTION SUBCUTANEOUS at 04:11

## 2024-11-11 RX ADMIN — LEVALBUTEROL HYDROCHLORIDE 0.63 MG: 0.63 SOLUTION RESPIRATORY (INHALATION) at 07:11

## 2024-11-11 RX ADMIN — FAMOTIDINE 40 MG: 20 TABLET ORAL at 08:11

## 2024-11-11 RX ADMIN — ASPIRIN 81 MG CHEWABLE TABLET 81 MG: 81 TABLET CHEWABLE at 08:11

## 2024-11-11 RX ADMIN — BUDESONIDE 1 MG: 0.5 INHALANT RESPIRATORY (INHALATION) at 07:11

## 2024-11-11 RX ADMIN — TAMSULOSIN HYDROCHLORIDE 0.4 MG: 0.4 CAPSULE ORAL at 08:11

## 2024-11-11 RX ADMIN — FUROSEMIDE 40 MG: 10 INJECTION, SOLUTION INTRAMUSCULAR; INTRAVENOUS at 08:11

## 2024-11-11 RX ADMIN — LEVALBUTEROL HYDROCHLORIDE 0.63 MG: 0.63 SOLUTION RESPIRATORY (INHALATION) at 02:11

## 2024-11-11 RX ADMIN — SUCRALFATE 1 G: 1 TABLET ORAL at 10:11

## 2024-11-11 RX ADMIN — LEVOFLOXACIN 750 MG: 5 INJECTION, SOLUTION INTRAVENOUS at 04:11

## 2024-11-11 RX ADMIN — POTASSIUM BICARBONATE 50 MEQ: 977.5 TABLET, EFFERVESCENT ORAL at 08:11

## 2024-11-11 RX ADMIN — PREDNISONE 60 MG: 20 TABLET ORAL at 08:11

## 2024-11-11 NOTE — DISCHARGE SUMMARY
[facility]  Discharge Summary      Patient Name: Emmanuel Grant  MRN: [id number]  ALVARO: [id number]  Patient Class: IP- Inpatient  Admission Date: 11/5/2024  Hospital Length of Stay: 5 days  Discharge Date and Time:  11/11/2024 3:53 PM  Attending Physician: Kristian Han MD   Discharging Provider: Umang Miller PA-C  Primary Care Provider: Wilfredo De Souza MD    Primary Care Team:  Hosp Med NAIF 3    HPI:   73 y.o. man with h/o NIDDM type 2 (A1c 8 in September 2024), CAD (with h/o stent), Essential HTN, h/o CVA, ILD complicated by chronic hypoxic respiratory failure (on 2L NC at home) and recently treated with MMF 500mg PO BID (started 9/2024) presents to the ER with worsening SOB and cough. COVID/Flu negative. Finished a round of Azithromycin. NO c/o Fevers or chills. NO N/V/Diarrhea. No chest pain. States can't stop sneezing or coughing.  [place] ED noted Sats upper 80s on his home 2L NC. D/w pulmonary on call Dr. Pérez who recommended transfer to [facility] and to start steroids, Levaquin 750mg IV qday and scheduled/PRN Duonebs.   Labs with normal WBC and stable H/H. Lytes unremarkable and renal function stable.  Glucose 147. VBG 7.3. D-Dimer normal and BNP normal Troponin negative x1 with NO STEMI on EKG.     CXR with:   Monitoring EKG leads are present. The trachea is unremarkable. The cardiomediastinal silhouette is stable. There is no evidence of free air beneath the hemidiaphragms. There are no pleural effusions. There is no evidence of a pneumothorax. There is no evidence of pneumomediastinum. There is increase in the appearance of diffuse bilateral airspace opacities. There are degenerative changes in the osseous structures.             * No surgery found *      Hospital Course:   Patient admitted for acute on chronic respiratory failure. Evaluated by Pulmonology who recommended continued diuresis as tolerated, Solu-Medrol, increasing CellCept dose and continous titration of  O2 to maintain saturations >90%. Patient euvolemic on exam and without signs of infection throughout admission. Antibiotics continued given patient's history of ILD and immunosuppressive therapy. Received 5 day course of IV Levaquin while inpatient, will not continue treatment on discharge given minimal suspicion for infection. Patient reported gradual improvement of SOB with treatment initiated therapy recommended by Pulmonology. He is now able to ambulate to bathroom and around hospital room without worsening of symptoms. Adequate saturations on home 2L. Walk test completed before discharge. Patient saturations noted at 94% on home 2L with exercise. Will discharge on home O2, increase not needed. Will discharge with Pulmonology recommendations of steroid taper, increased CellCept dose, budesonide and alformeterol nebulizers. Will also discharge on 3 days of Potassium, given repeated hypokalemia with albuterol treatments. BMP to be rechecked within 1 week. Patient is hemodynamically stable for discharge with PCP and Pulmonology follow-up appointments.     Goals of Care Treatment Preferences:  Code Status: Full Code      SDOH Screening:  The patient declined to be screened for utility difficulties, food insecurity, transport difficulties, housing insecurity, and interpersonal safety, so no concerns could be identified this admission.     Consults:   Consults (From admission, onward)          Status Ordering Provider     Inpatient consult to Pulmonology  Once        Provider:  Darian Pérez MD    Completed MINNIE HAYDEN            No new Assessment & Plan notes have been filed under this hospital service since the last note was generated.  Service: Hospital Medicine    Final Active Diagnoses:    Diagnosis Date Noted POA    Interstitial lung disease exacerbation [J84.9] 02/28/2024 Yes    Coronary artery disease involving native coronary artery of native heart with angina pectoris [I25.119] 02/28/2024 Yes    Type 2  diabetes mellitus without complication, without long-term current use of insulin [E11.9] 10/08/2021 Yes    Essential hypertension [I10] 05/29/2020 Yes      Problems Resolved During this Admission:    Diagnosis Date Noted Date Resolved POA    PRINCIPAL PROBLEM:  Acute on chronic hypoxic respiratory failure [J96.21] 09/23/2024 11/11/2024 Yes    Hypokalemia [E87.6] 11/08/2024 11/11/2024 No       Discharged Condition: good    Disposition: Home or Self Care    Follow Up:   Follow-up Information       Naheed Schmitt MD Follow up.    Specialty: Pulmonary Disease  Why: Office will call patient with a date and time., No appointments available. Message sent to staff.  Contact information:  [address]  [facility] [id number]  [phone]                           Patient Instructions:      BASIC METABOLIC PANEL   Standing Status: Future Standing Exp. Date: 02/09/26     Ambulatory referral/consult to Pulmonology   Standing Status: Future   Referral Priority: Routine Referral Type: Consultation   Referral Reason: Specialty Services Required   Requested Specialty: Pulmonary Disease   Number of Visits Requested: 1     Ambulatory referral/consult to Outpatient Case Management   Referral Priority: Routine Referral Type: Consultation   Referral Reason: Specialty Services Required   Number of Visits Requested: 1     Diet Cardiac     Diet diabetic     Notify your health care provider if you experience any of the following:  temperature >100.4     Notify your health care provider if you experience any of the following:  persistent nausea and vomiting or diarrhea     Notify your health care provider if you experience any of the following:  severe uncontrolled pain     Notify your health care provider if you experience any of the following:  difficulty breathing or increased cough     Notify your health care provider if you experience any of the following:  severe persistent headache     Notify your health care provider if you  experience any of the following:  persistent dizziness, light-headedness, or visual disturbances     Notify your health care provider if you experience any of the following:  increased confusion or weakness     Activity as tolerated       Significant Diagnostic Studies: N/A    Pending Diagnostic Studies:       None           Medications:  Reconciled Home Medications:      Medication List        START taking these medications      arformoteroL 15 mcg/2 mL Nebu  Commonly known as: BROVANA  Take 2 mLs (15 mcg total) by nebulization 2 (two) times daily. Controller     budesonide 0.5 mg/2 mL nebulizer solution  Commonly known as: PULMICORT  Take 4 mLs (1 mg total) by nebulization 2 (two) times daily. Controller     famotidine 40 MG tablet  Commonly known as: PEPCID  Take 1 tablet (40 mg total) by mouth 2 (two) times daily.     potassium chloride SA 20 MEQ tablet  Commonly known as: K-DUR,KLOR-CON  Take 2 tablets (40 mEq total) by mouth once daily. for 3 days  Start taking on: November 12, 2024     predniSONE 20 MG tablet  Commonly known as: DELTASONE  Take 3 tablets (60 mg total) by mouth once daily for 5 days, THEN 2 tablets (40 mg total) once daily for 5 days, THEN 1 tablet (20 mg total) once daily for 5 days, THEN 0.5 tablets (10 mg total) once daily for 5 days.  Start taking on: November 12, 2024            CHANGE how you take these medications      mycophenolate 250 mg Cap  Commonly known as: CELLCEPT  Take 4 capsules (1,000 mg total) by mouth 2 (two) times daily.  What changed: how much to take            CONTINUE taking these medications      albuterol-ipratropium 2.5 mg-0.5 mg/3 mL nebulizer solution  Commonly known as: DUO-NEB  Take 3 mLs by nebulization every 6 (six) hours as needed for Wheezing. Rescue     amLODIPine 10 MG tablet  Commonly known as: NORVASC  Take 1 tablet (10 mg total) by mouth once daily.     aspirin 81 MG Chew  Take 81 mg by mouth once daily.     atorvastatin 40 MG tablet  Commonly known as:  LIPITOR  Take 1 tablet (40 mg total) by mouth every evening.     BEANO ORAL  Take 1 tablet by mouth 2 (two) times a day.     cholecalciferol (vitamin D3) 50 mcg (2,000 unit) Cap capsule  Commonly known as: VITAMIN D3  Take 1 capsule by mouth once daily.     ENTRESTO 24-26 mg per tablet  Generic drug: sacubitriL-valsartan  Take 1 tablet by mouth 2 (two) times daily.     fluticasone-salmeterol 250-50 mcg/dose 250-50 mcg/dose diskus inhaler  Commonly known as: ADVAIR  Inhale 1 puff into the lungs 2 (two) times daily. Controller     furosemide 20 MG tablet  Commonly known as: LASIX  Take 1 tablet (20 mg total) by mouth once daily.     glimepiride 2 MG tablet  Commonly known as: AMARYL  Take 1 tablet (2 mg total) by mouth 2 (two) times a day. Take with meals     isosorbide mononitrate 30 MG 24 hr tablet  Commonly known as: IMDUR  Take 1 tablet (30 mg total) by mouth once daily.     metFORMIN 1000 MG tablet  Commonly known as: GLUCOPHAGE  Take 1 tablet (1,000 mg total) by mouth 2 (two) times daily with meals.     metoprolol succinate 50 MG 24 hr tablet  Commonly known as: TOPROL-XL  Take 1 tablet (50 mg total) by mouth once daily.     mirabegron 25 mg Tb24 ER tablet  Commonly known as: MYRBETRIQ  Take 1 tablet (25 mg total) by mouth once daily.     nitroGLYCERIN 0.3 MG SL tablet  Commonly known as: NITROSTAT  Place 1 tablet (0.3 mg total) under the tongue every 5 (five) minutes as needed for Chest pain.     sucralfate 1 gram tablet  Commonly known as: CARAFATE  Take 1 tablet (1 g total) by mouth 4 (four) times daily before meals and nightly.     tamsulosin 0.4 mg Cap  Commonly known as: FLOMAX  Take 1 capsule (0.4 mg total) by mouth once daily.            STOP taking these medications      azithromycin 250 MG tablet  Commonly known as: Z-ARA     methylPREDNISolone 4 mg tablet  Commonly known as: MEDROL DOSEPACK     omeprazole 40 MG capsule  Commonly known as: PRILOSEC              Indwelling Lines/Drains at time of  discharge:   Lines/Drains/Airways       None                   Time spent on the discharge of patient: 15 minutes         Umang Miller PA-C  Department of Hospital Medicine  [facility] Surg

## 2024-11-11 NOTE — NURSING
[facility]  Nurses Note -- 4 Eyes      11/10/2024       Skin assessed on: Q Shift      [x] No Pressure Injuries Present    [x]Prevention Measures Documented    [] Yes LDA  for Pressure Injury Previously documented     [] Yes New Pressure Injury Discovered   [] LDA for New Pressure Injury Added      Attending RN:  Emi Mann RN     Second RN:  KANDI Bliss

## 2024-11-11 NOTE — NURSING
Testing for Home O2    O2 Sats on RA at rest = 84%    O2 sats on RA with exercise  = 82%    O2 sats on _2_L O2 with exercise = 94%

## 2024-11-11 NOTE — PROGRESS NOTES
[facility] Medicine  Progress Note    Patient Name: Emmanuel Grant  MRN: [id number]  Patient Class: IP- Inpatient   Admission Date: 11/5/2024  Length of Stay: 5 days  Attending Physician: Kristian Han MD  Primary Care Provider: Wilfredo De Souza MD        Subjective:     Principal Problem:Acute on chronic hypoxic respiratory failure        HPI:  73 y.o. man with h/o NIDDM type 2 (A1c 8 in September 2024), CAD (with h/o stent), Essential HTN, h/o CVA, ILD complicated by chronic hypoxic respiratory failure (on 2L NC at home) and recently treated with MMF 500mg PO BID (started 9/2024) presents to the ER with worsening SOB and cough. COVID/Flu negative. Finished a round of Azithromycin. NO c/o Fevers or chills. NO N/V/Diarrhea. No chest pain. States can't stop sneezing or coughing.  [place] ED noted Sats upper 80s on his home 2L NC. D/w pulmonary on call Dr. Pérez who recommended transfer to [facility] and to start steroids, Levaquin 750mg IV qday and scheduled/PRN Duonebs.   Labs with normal WBC and stable H/H. Lytes unremarkable and renal function stable.  Glucose 147. VBG 7.3. D-Dimer normal and BNP normal Troponin negative x1 with NO STEMI on EKG.     CXR with:   Monitoring EKG leads are present. The trachea is unremarkable. The cardiomediastinal silhouette is stable. There is no evidence of free air beneath the hemidiaphragms. There are no pleural effusions. There is no evidence of a pneumothorax. There is no evidence of pneumomediastinum. There is increase in the appearance of diffuse bilateral airspace opacities. There are degenerative changes in the osseous structures.             Overview/Hospital Course:  Patient admitted for acute on chronic respiratory failure. Evaluated by Pulmonology who recommended continued diuresis as tolerated, Solu-Medrol, increasing CellCept dose and continous titration of O2 to maintain saturations >90%. Patient euvolemic on exam and without signs of infection  throughout admission. Antibiotics continued given patient's history of ILD and immunosuppressive therapy. Patient reported gradual improvement of SOB with treatment initiated therapy recommended by Pulmonology. He is now able to ambulate to bathroom without worsening of symptoms. Adequate saturations on home 2L.     Interval History: Patient seen and examined. Reports improvement of SOB. Ambulating around hospital room without BOSTON. Adequate saturations on home 2L. Mild crackles noted on exam, however, notable improvement from clinical status on admission. No abdominal pain. Walk test today to evaluate need for increased home O2.    Review of Systems   Constitutional:  Negative for chills, fatigue and fever.   Respiratory:  Positive for shortness of breath (improving).    Cardiovascular:  Negative for chest pain, palpitations and leg swelling.   Gastrointestinal:  Negative for abdominal pain, constipation, diarrhea, nausea and vomiting.     Objective:     Vital Signs (Most Recent):  Temp: 98 °F (36.7 °C) (11/11/24 1137)  Pulse: 81 (11/11/24 1137)  Resp: 18 (11/11/24 1137)  BP: 107/66 (11/11/24 1137)  SpO2: 95 % (11/11/24 1137) Vital Signs (24h Range):  Temp:  [97.5 °F (36.4 °C)-98.2 °F (36.8 °C)] 98 °F (36.7 °C)  Pulse:  [64-89] 81  Resp:  [14-18] 18  SpO2:  [95 %-100 %] 95 %  BP: (103-151)/(65-87) 107/66     Weight: 68 kg (150 lb)  Body mass index is 22.81 kg/m².    Intake/Output Summary (Last 24 hours) at 11/11/2024 1315  Last data filed at 11/10/2024 1721  Gross per 24 hour   Intake 240 ml   Output --   Net 240 ml         Physical Exam  Vitals and nursing note reviewed.   HENT:      Head: Normocephalic.      Mouth/Throat:      Mouth: Mucous membranes are moist.      Pharynx: Oropharynx is clear.   Eyes:      Conjunctiva/sclera: Conjunctivae normal.   Cardiovascular:      Rate and Rhythm: Normal rate and regular rhythm.   Pulmonary:      Effort: No respiratory distress.      Comments: Mild crackles. On home 2L NC.    Abdominal:      General: Bowel sounds are normal.      Palpations: Abdomen is soft.   Musculoskeletal:      Right lower leg: No edema.      Left lower leg: No edema.   Neurological:      Mental Status: He is alert and oriented to person, place, and time.             Significant Labs: All pertinent labs within the past 24 hours have been reviewed.    Significant Imaging: I have reviewed all pertinent imaging results/findings within the past 24 hours.    Assessment/Plan:      * Acute on chronic hypoxic respiratory failure  Patient with Hypercapnic and Hypoxic Respiratory failure which is Acute on chronic.  he is on home oxygen at 2 LPM. Supplemental oxygen was provided and noted- Oxygen Concentration (%):  [50] 50    .   Signs/symptoms of respiratory failure include- increased work of breathing and cough . Contributing diagnoses includes - Interstitial lung disease Labs and images were reviewed.     F/u with pulmonary consult.     Hypokalemia  Patient's most recent potassium results are listed below.   Recent Labs     11/09/24  0453 11/10/24  0432 11/11/24  0604   K 3.2* 3.3* 3.2*       Plan  - Replete potassium per protocol  - Monitor potassium Daily  - Patient's hypokalemia is stable  - Likely 2/2 albuterol treatments. Continue to monitor.    Essential hypertension  Patients blood pressure range in the last 24 hours was: BP  Min: 109/68  Max: 150/89.The patient's inpatient anti-hypertensive regimen is listed below:  Current Antihypertensives  hydrALAZINE injection 5 mg, Every 6 hours PRN, Intravenous  furosemide injection 40 mg, Daily, Intravenous  amLODIPine tablet 10 mg, Daily, Oral  isosorbide mononitrate 24 hr tablet 30 mg, Daily, Oral  metoprolol succinate (TOPROL-XL) 24 hr tablet 50 mg, Daily, Oral  nitroGLYCERIN SL tablet 0.4 mg, Every 5 min PRN, Sublingual    Plan  - BP is controlled, no changes needed to their regimen  - Diet adjusted    Coronary artery disease involving native coronary artery of native  heart with angina pectoris  Patient with known CAD s/p stent placement, which is controlled Will continue ASA and Statin and monitor for S/Sx of angina/ACS. Continue to monitor on telemetry. Troponin negative x1 and will f/u on repeat troponin levels. Monitor on Tele. NO c/o chest pain at this time.     Interstitial lung disease exacerbation  F/u with pulmonary consult. Cont. Steroids, IV lasix, IV abx and nebs per pulmonary recs. Cont. MMF 500mg PO BID for now.     Results for orders placed during the hospital encounter of 10/17/24    Echo    Interpretation Summary    Left Ventricle: Regional wall motion abnormalities present. There is normal systolic function with a visually estimated ejection fraction of 55 %. Grade I diastolic dysfunction. federico septal hypokinesis    Right Ventricle: Normal right ventricular cavity size. Systolic function is normal.    Left Atrium: Left atrium is mildly dilated.    IVC/SVC: Normal venous pressure at 3 mmHg.    Pulmonology consulted:   Continue diuresis as tolerated  Solu-Medrol q.day, transition to Prednisone 60mg q.day, taper on discharge  CellCept 1000mg BID (monitor closely for GI symptoms)  Titrate O2 to maintain saturations >90%  Continue budesonide, arformoterol nebulizer  Xopenex prn    Testing for Home O2   O2 Sats on RA at rest = 84%   O2 sats on RA with exercise  = 82%   O2 sats on _2_L O2 with exercise = 94%  Patient to remain on 2L NC given adequate respirations with exercise.    Type 2 diabetes mellitus without complication, without long-term current use of insulin  Patient's FSGs are controlled on current medication regimen.  Last A1c reviewed-   Lab Results   Component Value Date    HGBA1C 8.0 (H) 09/25/2024     Most recent fingerstick glucose reviewed-   Recent Labs   Lab 11/10/24  1937 11/10/24  2304 11/11/24  0700 11/11/24  1135   POCTGLUCOSE 403* 320* 171* 308*       Current correctional scale  Moderate  Maintain anti-hyperglycemic dose as follows-    Antihyperglycemics (From admission, onward)      Start     Stop Route Frequency Ordered    11/09/24 0900  insulin glargine U-100 (Lantus) pen 15 Units         -- SubQ Daily 11/09/24 0753    11/06/24 1529  insulin aspart U-100 pen 0-10 Units         -- SubQ Before meals & nightly PRN 11/06/24 1430          Hold Oral hypoglycemics while patient is in the hospital. She takes Amaryl 2mg PO BID and Metformin 1gm PO BID       VTE Risk Mitigation (From admission, onward)           Ordered     enoxaparin injection 40 mg  Daily         11/05/24 1726     IP VTE HIGH RISK PATIENT  Once         11/05/24 1726     Place sequential compression device  Until discontinued         11/05/24 1726                    Discharge Planning   DERIC:      Code Status: Full Code   Is the patient medically ready for discharge?:     Reason for patient still in hospital (select all that apply): Consult recommendations  Discharge Plan A: Home                  Umang Miller PA-C  Department of Hospital Medicine   [facility] [place] - Med Surg

## 2024-11-11 NOTE — TELEPHONE ENCOUNTER
----- Message from Tech Jasimine sent at 11/11/2024  3:33 PM CST -----  Regarding: Appt  Contact: [phone]  Mariana from [facility] case management is calling to schedule an appointment due to acute on chronic hypoxic respiratory failure  Please contact patient to further discuss thank you.

## 2024-11-11 NOTE — NURSING
Pt lying in bed with the HOB at 45 degree angle. Pt is alert and oriented x 4. Respirations even and regular. Color pink . Pulse oximeter reading at 93%. Skin warm and dry. Pt noted in gown with O 2 on at 2 l/min.  Pt stated that he usually wears 3 l/min of O 2  at home but that when he goes to the bathroom that sometimes he forgets to put O2 on. Pt teaching done on the important of wearing his O2 and at time to check his nose to make sure oxygen is in his nose. Lungs clear bilaterally with diminished breaths sounds at bilateral bases. Call light in reach. Pt denies pain discomforts and admits that he is looking forward to going home soon

## 2024-11-11 NOTE — PLAN OF CARE
Case Management Final Discharge Note    Discharge Disposition: Home    New DME ordered / company name: None    Relevant SDOH / Transition of Care Barriers:  None    Primary Caretaker and contact information: Independent    Scheduled followup appointment: PCP scheduled.    Referrals placed: Pulmonology- staff will call pt to schedule an appointment.    Transportation: Family    Patient and family educated on discharge services and updated on DC plan. Bedside RN notified, patient clear to discharge from Case Management Perspective.       11/11/24 1543   Final Note   Assessment Type Final Discharge Note   Anticipated Discharge Disposition Home   What phone number can be called within the next 1-3 days to see how you are doing after discharge? [phone]   Hospital Resources/Appts/Education Provided Appointments scheduled and added to AVS   Post-Acute Status   Coverage [employer] MANAGED MEDICARE - [employer] MEDIC[employer] HMO   Discharge Delays (!) Patient and Family Barriers

## 2024-11-11 NOTE — ASSESSMENT & PLAN NOTE
Patient's most recent potassium results are listed below.   Recent Labs     11/09/24  0453 11/10/24  0432 11/11/24  0604   K 3.2* 3.3* 3.2*       Plan  - Replete potassium per protocol  - Monitor potassium Daily  - Patient's hypokalemia is stable  - Likely 2/2 albuterol treatments. Continue to monitor.

## 2024-11-11 NOTE — ASSESSMENT & PLAN NOTE
F/u with pulmonary consult. Cont. Steroids, IV lasix, IV abx and nebs per pulmonary recs. Cont. MMF 500mg PO BID for now.     Results for orders placed during the hospital encounter of 10/17/24    Echo    Interpretation Summary    Left Ventricle: Regional wall motion abnormalities present. There is normal systolic function with a visually estimated ejection fraction of 55 %. Grade I diastolic dysfunction. federico septal hypokinesis    Right Ventricle: Normal right ventricular cavity size. Systolic function is normal.    Left Atrium: Left atrium is mildly dilated.    IVC/SVC: Normal venous pressure at 3 mmHg.    Pulmonology consulted:   Continue diuresis as tolerated  Solu-Medrol q.day, transition to Prednisone 60mg q.day, taper on discharge  CellCept 1000mg BID (monitor closely for GI symptoms)  Titrate O2 to maintain saturations >90%  Continue budesonide, arformoterol nebulizer  Xopenex prn    Testing for Home O2   O2 Sats on RA at rest = 84%   O2 sats on RA with exercise  = 82%   O2 sats on _2_L O2 with exercise = 94%  Patient to remain on 2L NC given adequate respirations with exercise.

## 2024-11-11 NOTE — ASSESSMENT & PLAN NOTE
Patient's FSGs are controlled on current medication regimen.  Last A1c reviewed-   Lab Results   Component Value Date    HGBA1C 8.0 (H) 09/25/2024     Most recent fingerstick glucose reviewed-   Recent Labs   Lab 11/10/24  1937 11/10/24  2304 11/11/24  0700 11/11/24  1135   POCTGLUCOSE 403* 320* 171* 308*       Current correctional scale  Moderate  Maintain anti-hyperglycemic dose as follows-   Antihyperglycemics (From admission, onward)    Start     Stop Route Frequency Ordered    11/09/24 0900  insulin glargine U-100 (Lantus) pen 15 Units         -- SubQ Daily 11/09/24 0753    11/06/24 1529  insulin aspart U-100 pen 0-10 Units         -- SubQ Before meals & nightly PRN 11/06/24 1430        Hold Oral hypoglycemics while patient is in the hospital. She takes Amaryl 2mg PO BID and Metformin 1gm PO BID

## 2024-11-11 NOTE — NURSING
[facility], [facility]  Nurses Note -- 4 Eyes      11/11/2024       Skin assessed on: Q Shift      [x] No Pressure Injuries Present    []Prevention Measures Documented    [] Yes LDA  for Pressure Injury Previously documented     [] Yes New Pressure Injury Discovered   [] LDA for New Pressure Injury Added      Attending RN:  Jackie Chinchilla RN     Second RN:  Rosa Elena

## 2024-11-11 NOTE — PLAN OF CARE
Problem: Adult Inpatient Plan of Care  Goal: Plan of Care Review  Outcome: Progressing  Flowsheets (Taken 11/11/2024 0517)  Plan of Care Reviewed With: patient     Problem: COPD (Chronic Obstructive Pulmonary Disease)  Goal: Optimal Chronic Illness Coping  Outcome: Progressing     Problem: Diabetes Comorbidity  Goal: Blood Glucose Level Within Targeted Range  Outcome: Progressing  Intervention: Monitor and Manage Glycemia  Flowsheets (Taken 11/11/2024 0517)  Glycemic Management: blood glucose monitored

## 2024-11-11 NOTE — PLAN OF CARE
"Changes in medical condition or discharge plan: Nurse completed a walk test. Pt has oxygen at home.    Does patient need new DME? None    Follow up appts needed: PCP and pulmonology    Medically stable: No    Estimated Discharge Date:   1- 2 days    CM informed pt that he will need his portable tank before he discharges. Pt stated" Everyone is at work." CM explained that he could wait because he couldn't discharge until he has a portable tank. Pt verbally expressed understanding.      11/11/24 1324   Discharge Reassessment   Assessment Type Discharge Planning Reassessment   Did the patient's condition or plan change since previous assessment? Yes   Discharge Plan discussed with: Patient   Communicated DERIC with patient/caregiver Yes   Discharge Plan A Home   Discharge Plan B Home with family   DME Needed Upon Discharge  other (see comments)  (tbd)   Transition of Care Barriers None   Why the patient remains in the hospital Requires continued medical care   Post-Acute Status   Coverage [employer] MANAGED MEDICARE - [employer] MED[employer] HMO   Discharge Delays None known at this time       "

## 2024-11-11 NOTE — TELEPHONE ENCOUNTER
Attempted to contact patient in regards to scheduling a hospital follow up. Patient voicemail is not set up. Message was sent to Dr. Schmitt this morning for a follow up appointment in 12/2024.

## 2024-11-11 NOTE — SUBJECTIVE & OBJECTIVE
Interval History: Patient seen and examined. Reports improvement of SOB. Ambulating around hospital room without BOSTON. Adequate saturations on home 2L. Mild crackles noted on exam, however, notable improvement from clinical status on admission. No abdominal pain. Walk test today to evaluate need for increased home O2.    Review of Systems   Constitutional:  Negative for chills, fatigue and fever.   Respiratory:  Positive for shortness of breath (improving).    Cardiovascular:  Negative for chest pain, palpitations and leg swelling.   Gastrointestinal:  Negative for abdominal pain, constipation, diarrhea, nausea and vomiting.     Objective:     Vital Signs (Most Recent):  Temp: 98 °F (36.7 °C) (11/11/24 1137)  Pulse: 81 (11/11/24 1137)  Resp: 18 (11/11/24 1137)  BP: 107/66 (11/11/24 1137)  SpO2: 95 % (11/11/24 1137) Vital Signs (24h Range):  Temp:  [97.5 °F (36.4 °C)-98.2 °F (36.8 °C)] 98 °F (36.7 °C)  Pulse:  [64-89] 81  Resp:  [14-18] 18  SpO2:  [95 %-100 %] 95 %  BP: (103-151)/(65-87) 107/66     Weight: 68 kg (150 lb)  Body mass index is 22.81 kg/m².    Intake/Output Summary (Last 24 hours) at 11/11/2024 1315  Last data filed at 11/10/2024 1721  Gross per 24 hour   Intake 240 ml   Output --   Net 240 ml         Physical Exam  Vitals and nursing note reviewed.   HENT:      Head: Normocephalic.      Mouth/Throat:      Mouth: Mucous membranes are moist.      Pharynx: Oropharynx is clear.   Eyes:      Conjunctiva/sclera: Conjunctivae normal.   Cardiovascular:      Rate and Rhythm: Normal rate and regular rhythm.   Pulmonary:      Effort: No respiratory distress.      Comments: Mild crackles. On home 2L NC.   Abdominal:      General: Bowel sounds are normal.      Palpations: Abdomen is soft.   Musculoskeletal:      Right lower leg: No edema.      Left lower leg: No edema.   Neurological:      Mental Status: He is alert and oriented to person, place, and time.             Significant Labs: All pertinent labs within the  past 24 hours have been reviewed.    Significant Imaging: I have reviewed all pertinent imaging results/findings within the past 24 hours.

## 2024-11-11 NOTE — NURSING
Pt is alert and oriented. On oxygen support at 2L NC, not in distress. Pt rested well all night. No complaint all night. Able to go up to the toilet independently with oxygen support. On cardiac monitoring. On glucose monitoring, coverage given. Pt's need attended. Instructed to call for assistance. Bed alarm set. Bed in low position. Bed in low position. Call light within reach.

## 2024-11-11 NOTE — PLAN OF CARE
11/11/24 1228   Medicare Message   Important Message from Medicare regarding Discharge Appeal Rights Given to patient/caregiver;Explained to patient/caregiver;Signed/date by patient/caregiver   Date IMM was signed 11/11/24   Time IMM was signed 2202

## 2024-11-12 ENCOUNTER — PATIENT OUTREACH (OUTPATIENT)
Dept: ADMINISTRATIVE | Facility: OTHER | Age: 74
End: 2024-11-12
Payer: MEDICARE

## 2024-11-12 ENCOUNTER — TELEPHONE (OUTPATIENT)
Dept: PULMONOLOGY | Facility: CLINIC | Age: 74
End: 2024-11-12
Payer: MEDICARE

## 2024-11-12 NOTE — NURSING
Reviewed discharge instructions with patient. Instructions includes follow up appointments,EMS and when to seeks additional medical help. No acute distress noted. Pepcid received from [facility]. Orders med should be picked [facility]

## 2024-11-12 NOTE — TELEPHONE ENCOUNTER
----- Message from Naheed Schmitt MD sent at 11/11/2024  8:46 PM CST -----  Regarding: RE: Clinic follow up  Yes, for him we can take one of those spots. Thank you!  ----- Message -----  From: Kong Rodríguez MA  Sent: 11/11/2024   8:20 AM CST  To: Naheed Schmitt MD  Subject: RE: Clinic follow up                             Good morning Dr. Schmitt,    You only have NP slots for 12/24. Would you like for me to use a NP slots?    Thank you,  Kong  ----- Message -----  From: Naheed Schmitt MD  Sent: 11/10/2024   9:27 PM CST  To: Kong Rodríguez MA  Subject: FW: Clinic follow up                             Hey!  This sweet navi needs to see my that December week too if he can wait that long. I can also work him in this month if need be.     Thanks,  Naheed    (I am working nights this week so apologies for middle of the night messages lol)  ----- Message -----  From: Marc Olivo MD  Sent: 11/10/2024  12:00 PM CST  To: Naheed Schmitt MD  Subject: Clinic follow up                                 Naheed:  Mr. Grant has been inpatient at [facility] for presumed ILD exacerbation.  He is nearing discharge but will need clinic follow up.  He's been hospitalized 3-4x since you last saw him in September.  He seems back to baseline (2 lpm) and pretty stable today, so he will likely be discharged pretty soon.  I recommended a slow steroid taper over several weeks.  I don't see that he has any scheduled Pulmonary follow up, but he likely should be seen sometime before the end of the year -- either with you or referral to ALD clinic.  Thanks, BRYANT

## 2024-11-12 NOTE — TELEPHONE ENCOUNTER
Attempted to contact patient in regards to scheduling an appointment. Patient voicemail is not set up.

## 2024-11-13 ENCOUNTER — HOSPITAL ENCOUNTER (OUTPATIENT)
Facility: HOSPITAL | Age: 74
Discharge: HOME | End: 2024-11-13
Attending: INTERNAL MEDICINE | Admitting: INTERNAL MEDICINE
Payer: MEDICARE

## 2024-11-13 VITALS
TEMPERATURE: 98 F | DIASTOLIC BLOOD PRESSURE: 76 MMHG | HEART RATE: 97 BPM | RESPIRATION RATE: 20 BRPM | WEIGHT: 150 LBS | OXYGEN SATURATION: 93 % | HEIGHT: 68 IN | BODY MASS INDEX: 22.73 KG/M2 | SYSTOLIC BLOOD PRESSURE: 113 MMHG

## 2024-11-13 DIAGNOSIS — K21.9 GERD (GASTROESOPHAGEAL REFLUX DISEASE): ICD-10-CM

## 2024-11-13 LAB — POCT GLUCOSE: 179 MG/DL (ref 70–110)

## 2024-11-13 PROCEDURE — 25000003 PHARM REV CODE 250: Mod: HCNC | Performed by: INTERNAL MEDICINE

## 2024-11-13 PROCEDURE — 91037 ESOPH IMPED FUNCTION TEST: CPT | Mod: TC,HCNC | Performed by: INTERNAL MEDICINE

## 2024-11-13 RX ORDER — LIDOCAINE HYDROCHLORIDE 20 MG/ML
JELLY TOPICAL ONCE
Status: COMPLETED | OUTPATIENT
Start: 2024-11-13 | End: 2024-11-13

## 2024-11-13 RX ADMIN — LIDOCAINE HYDROCHLORIDE 10 ML: 20 JELLY TOPICAL at 08:11

## 2024-11-14 NOTE — OR NURSING
11/14/2024 - Impedance catheter removed from right naris @36.5cm. Skin @ tegaderm sites WDL after removal. Tolerated 24 hr study w/o difficulty.

## 2024-11-15 PROCEDURE — 91010 ESOPHAGUS MOTILITY STUDY: CPT | Mod: 26,HCNC,, | Performed by: INTERNAL MEDICINE

## 2024-11-15 PROCEDURE — 91037 ESOPH IMPED FUNCTION TEST: CPT | Mod: 26,HCNC,, | Performed by: INTERNAL MEDICINE

## 2024-11-15 NOTE — PROVATION PATIENT INSTRUCTIONS
Discharge Summary/Instructions after an Endoscopic Procedure  Patient Name: Emmanuel Grant  Patient MRN: [id number]  Patient YOB: 1950  Friday, November 15, 2024  Stephany Mendoza MD  Dear patient,  As a result of recent federal legislation (The Federal Cures Act), you may   receive lab or pathology results from your procedure in your [facility]   account before your physician is able to contact you. Your physician or   their representative will relay the results to you with their   recommendations at their soonest availability.  Thank you,  RESTRICTIONS:  During your procedure today, you received medications for sedation.  These   medications may affect your judgment, balance and coordination.  Therefore,   for 24 hours, you have the following restrictions:   - DO NOT drive a car, operate machinery, make legal/financial decisions,   sign important papers or drink alcohol.    ACTIVITY:  Today: no heavy lifting, straining or running due to procedural   sedation/anesthesia.  The following day: return to full activity including work.  DIET:  Eat and drink normally unless instructed otherwise.     TREATMENT FOR COMMON SIDE EFFECTS:  - Mild abdominal pain, nausea, belching, bloating or excessive gas:  rest,   eat lightly and use a heating pad.  - Sore Throat: treat with throat lozenges and/or gargle with warm salt   water.  - Because air was used during the procedure, expelling large amounts of air   from your rectum or belching is normal.  - If a bowel prep was taken, you may not have a bowel movement for 1-3 days.    This is normal.  SYMPTOMS TO WATCH FOR AND REPORT TO YOUR PHYSICIAN:  1. Abdominal pain or bloating, other than gas cramps.  2. Chest pain.  3. Back pain.  4. Signs of infection such as: chills or fever occurring within 24 hours   after the procedure.  5. Rectal bleeding, which would show as bright red, maroon, or black stools.   (A tablespoon of blood from the rectum is not serious, especially  if   hemorrhoids are present.)  6. Vomiting.  7. Weakness or dizziness.  GO DIRECTLY TO THE NEAREST EMERGENCY ROOM IF YOU HAVE ANY OF THE FOLLOWING:      Difficulty breathing              Chills and/or fever over 101 F   Persistent vomiting and/or vomiting blood   Severe abdominal pain   Severe chest pain   Black, tarry stools   Bleeding- more than one tablespoon   Any other symptom or condition that you feel may need urgent attention  Your doctor recommends these additional instructions:  If any biopsies were taken, your doctors clinic will contact you in 1 to 2   weeks with any results.  Follow up with your referring provider.  Proceed to 24 hour pH impedance testing.  For questions, problems or results please call your physician - Stephany Mendoza MD at Work:  [phone].  [facility], EMERGENCY ROOM PHONE NUMBER: [phone]  IF A COMPLICATION OR EMERGENCY SITUATION ARISES AND YOU ARE UNABLE TO REACH   YOUR PHYSICIAN - GO DIRECTLY TO THE EMERGENCY ROOM.  Stephany Mendoza MD  11/15/2024 4:08:19 PM  This report has been verified and signed electronically.  Dear patient,  As a result of recent federal legislation (The Federal Cures Act), you may   receive lab or pathology results from your procedure in your [facility]   account before your physician is able to contact you. Your physician or   their representative will relay the results to you with their   recommendations at their soonest availability.  Thank you,  PROVATION

## 2024-11-15 NOTE — PROVATION PATIENT INSTRUCTIONS
Discharge Summary/Instructions after an Endoscopic Procedure  Patient Name: Emmanuel Grant  Patient MRN: [id number]  Patient YOB: 1950  Friday, November 15, 2024  Stephany Mendoza MD  Dear patient,  As a result of recent federal legislation (The Federal Cures Act), you may   receive lab or pathology results from your procedure in your [facility]   account before your physician is able to contact you. Your physician or   their representative will relay the results to you with their   recommendations at their soonest availability.  Thank you,  RESTRICTIONS:  During your procedure today, you received medications for sedation.  These   medications may affect your judgment, balance and coordination.  Therefore,   for 24 hours, you have the following restrictions:   - DO NOT drive a car, operate machinery, make legal/financial decisions,   sign important papers or drink alcohol.    ACTIVITY:  Today: no heavy lifting, straining or running due to procedural   sedation/anesthesia.  The following day: return to full activity including work.  DIET:  Eat and drink normally unless instructed otherwise.     TREATMENT FOR COMMON SIDE EFFECTS:  - Mild abdominal pain, nausea, belching, bloating or excessive gas:  rest,   eat lightly and use a heating pad.  - Sore Throat: treat with throat lozenges and/or gargle with warm salt   water.  - Because air was used during the procedure, expelling large amounts of air   from your rectum or belching is normal.  - If a bowel prep was taken, you may not have a bowel movement for 1-3 days.    This is normal.  SYMPTOMS TO WATCH FOR AND REPORT TO YOUR PHYSICIAN:  1. Abdominal pain or bloating, other than gas cramps.  2. Chest pain.  3. Back pain.  4. Signs of infection such as: chills or fever occurring within 24 hours   after the procedure.  5. Rectal bleeding, which would show as bright red, maroon, or black stools.   (A tablespoon of blood from the rectum is not serious, especially  if   hemorrhoids are present.)  6. Vomiting.  7. Weakness or dizziness.  GO DIRECTLY TO THE NEAREST EMERGENCY ROOM IF YOU HAVE ANY OF THE FOLLOWING:      Difficulty breathing              Chills and/or fever over 101 F   Persistent vomiting and/or vomiting blood   Severe abdominal pain   Severe chest pain   Black, tarry stools   Bleeding- more than one tablespoon   Any other symptom or condition that you feel may need urgent attention  Your doctor recommends these additional instructions:  If any biopsies were taken, your doctors clinic will contact you in 1 to 2   weeks with any results.  Follow up with your referring provider.  For questions, problems or results please call your physician - Stephany Mendoza MD at Work:  [phone].  [facility], EMERGENCY ROOM PHONE NUMBER: [phone]  IF A COMPLICATION OR EMERGENCY SITUATION ARISES AND YOU ARE UNABLE TO REACH   YOUR PHYSICIAN - GO DIRECTLY TO THE EMERGENCY ROOM.  Stephany Mendoza MD  11/15/2024 4:20:32 PM  This report has been verified and signed electronically.  Dear patient,  As a result of recent federal legislation (The Federal Cures Act), you may   receive lab or pathology results from your procedure in your [facility]   account before your physician is able to contact you. Your physician or   their representative will relay the results to you with their   recommendations at their soonest availability.  Thank you,  PROVATION

## 2024-11-20 ENCOUNTER — OFFICE VISIT (OUTPATIENT)
Dept: FAMILY MEDICINE | Facility: CLINIC | Age: 74
End: 2024-11-20
Payer: MEDICARE

## 2024-11-20 ENCOUNTER — EXTERNAL HOME HEALTH (OUTPATIENT)
Dept: HOME HEALTH SERVICES | Facility: HOSPITAL | Age: 74
End: 2024-11-20
Payer: MEDICARE

## 2024-11-20 ENCOUNTER — HOSPITAL ENCOUNTER (EMERGENCY)
Facility: HOSPITAL | Age: 74
Discharge: HOME | End: 2024-11-20
Attending: STUDENT IN AN ORGANIZED HEALTH CARE EDUCATION/TRAINING PROGRAM
Payer: MEDICARE

## 2024-11-20 VITALS
WEIGHT: 150 LBS | HEIGHT: 68 IN | OXYGEN SATURATION: 98 % | BODY MASS INDEX: 22.73 KG/M2 | DIASTOLIC BLOOD PRESSURE: 98 MMHG | TEMPERATURE: 98 F | SYSTOLIC BLOOD PRESSURE: 102 MMHG | HEART RATE: 82 BPM | RESPIRATION RATE: 16 BRPM

## 2024-11-20 VITALS
OXYGEN SATURATION: 94 % | TEMPERATURE: 97 F | SYSTOLIC BLOOD PRESSURE: 82 MMHG | HEART RATE: 90 BPM | DIASTOLIC BLOOD PRESSURE: 60 MMHG

## 2024-11-20 DIAGNOSIS — Z91.148 DIFFICULTY TAKING MEDICATION: ICD-10-CM

## 2024-11-20 DIAGNOSIS — I25.119 CORONARY ARTERY DISEASE INVOLVING NATIVE CORONARY ARTERY OF NATIVE HEART WITH ANGINA PECTORIS: ICD-10-CM

## 2024-11-20 DIAGNOSIS — Z00.00 HEALTHCARE MAINTENANCE: Primary | ICD-10-CM

## 2024-11-20 DIAGNOSIS — J84.9 INTERSTITIAL LUNG DISEASE: ICD-10-CM

## 2024-11-20 DIAGNOSIS — R06.02 SHORTNESS OF BREATH: ICD-10-CM

## 2024-11-20 DIAGNOSIS — K21.9 GASTROESOPHAGEAL REFLUX DISEASE, UNSPECIFIED WHETHER ESOPHAGITIS PRESENT: ICD-10-CM

## 2024-11-20 DIAGNOSIS — E11.9 TYPE 2 DIABETES MELLITUS WITHOUT COMPLICATION, WITHOUT LONG-TERM CURRENT USE OF INSULIN: ICD-10-CM

## 2024-11-20 DIAGNOSIS — D84.821 DRUG-INDUCED IMMUNODEFICIENCY: ICD-10-CM

## 2024-11-20 DIAGNOSIS — I50.22 CHRONIC HFREF (HEART FAILURE WITH REDUCED EJECTION FRACTION): ICD-10-CM

## 2024-11-20 DIAGNOSIS — I10 ESSENTIAL HYPERTENSION: ICD-10-CM

## 2024-11-20 DIAGNOSIS — I95.9 HYPOTENSION: ICD-10-CM

## 2024-11-20 DIAGNOSIS — E78.2 MIXED HYPERLIPIDEMIA: ICD-10-CM

## 2024-11-20 DIAGNOSIS — J96.11 CHRONIC HYPOXEMIC RESPIRATORY FAILURE: ICD-10-CM

## 2024-11-20 DIAGNOSIS — Z79.899 DRUG-INDUCED IMMUNODEFICIENCY: ICD-10-CM

## 2024-11-20 LAB
ALBUMIN SERPL BCP-MCNC: 2.9 G/DL (ref 3.5–5.2)
ALP SERPL-CCNC: 49 U/L (ref 40–150)
ALT SERPL W/O P-5'-P-CCNC: 23 U/L (ref 10–44)
ANION GAP SERPL CALC-SCNC: 11 MMOL/L (ref 8–16)
AST SERPL-CCNC: 14 U/L (ref 10–40)
BASOPHILS # BLD AUTO: 0.01 K/UL (ref 0–0.2)
BASOPHILS NFR BLD: 0.1 % (ref 0–1.9)
BILIRUB SERPL-MCNC: 0.4 MG/DL (ref 0.1–1)
BNP SERPL-MCNC: 95 PG/ML (ref 0–99)
BUN SERPL-MCNC: 26 MG/DL (ref 8–23)
CALCIUM SERPL-MCNC: 8.8 MG/DL (ref 8.7–10.5)
CHLORIDE SERPL-SCNC: 104 MMOL/L (ref 95–110)
CO2 SERPL-SCNC: 19 MMOL/L (ref 23–29)
CREAT SERPL-MCNC: 1.2 MG/DL (ref 0.5–1.4)
DIFFERENTIAL METHOD BLD: ABNORMAL
EOSINOPHIL # BLD AUTO: 0 K/UL (ref 0–0.5)
EOSINOPHIL NFR BLD: 0 % (ref 0–8)
ERYTHROCYTE [DISTWIDTH] IN BLOOD BY AUTOMATED COUNT: 13.6 % (ref 11.5–14.5)
EST. GFR  (NO RACE VARIABLE): >60 ML/MIN/1.73 M^2
GLUCOSE SERPL-MCNC: 375 MG/DL (ref 70–110)
HCT VFR BLD AUTO: 35 % (ref 40–54)
HGB BLD-MCNC: 12 G/DL (ref 14–18)
IMM GRANULOCYTES # BLD AUTO: 0.09 K/UL (ref 0–0.04)
IMM GRANULOCYTES NFR BLD AUTO: 0.7 % (ref 0–0.5)
LYMPHOCYTES # BLD AUTO: 0.9 K/UL (ref 1–4.8)
LYMPHOCYTES NFR BLD: 6.8 % (ref 18–48)
MAGNESIUM SERPL-MCNC: 1.2 MG/DL (ref 1.6–2.6)
MCH RBC QN AUTO: 28.8 PG (ref 27–31)
MCHC RBC AUTO-ENTMCNC: 34.3 G/DL (ref 32–36)
MCV RBC AUTO: 84 FL (ref 82–98)
MONOCYTES # BLD AUTO: 0.6 K/UL (ref 0.3–1)
MONOCYTES NFR BLD: 4.7 % (ref 4–15)
NEUTROPHILS # BLD AUTO: 12 K/UL (ref 1.8–7.7)
NEUTROPHILS NFR BLD: 87.7 % (ref 38–73)
NRBC BLD-RTO: 0 /100 WBC
OHS QRS DURATION: 108 MS
OHS QTC CALCULATION: 450 MS
PLATELET # BLD AUTO: 211 K/UL (ref 150–450)
PMV BLD AUTO: 10.4 FL (ref 9.2–12.9)
POTASSIUM SERPL-SCNC: 4.8 MMOL/L (ref 3.5–5.1)
PROT SERPL-MCNC: 6 G/DL (ref 6–8.4)
RBC # BLD AUTO: 4.17 M/UL (ref 4.6–6.2)
SODIUM SERPL-SCNC: 134 MMOL/L (ref 136–145)
WBC # BLD AUTO: 13.69 K/UL (ref 3.9–12.7)

## 2024-11-20 PROCEDURE — 99999 PR PBB SHADOW E&M-EST. PATIENT-LVL V: CPT | Mod: PBBFAC,HCNC,, | Performed by: INTERNAL MEDICINE

## 2024-11-20 PROCEDURE — 93010 ELECTROCARDIOGRAM REPORT: CPT | Mod: HCNC,,, | Performed by: INTERNAL MEDICINE

## 2024-11-20 PROCEDURE — 1101F PT FALLS ASSESS-DOCD LE1/YR: CPT | Mod: HCNC,CPTII,S$GLB, | Performed by: INTERNAL MEDICINE

## 2024-11-20 PROCEDURE — 3078F DIAST BP <80 MM HG: CPT | Mod: HCNC,CPTII,S$GLB, | Performed by: INTERNAL MEDICINE

## 2024-11-20 PROCEDURE — 63600175 PHARM REV CODE 636 W HCPCS: Mod: HCNC | Performed by: STUDENT IN AN ORGANIZED HEALTH CARE EDUCATION/TRAINING PROGRAM

## 2024-11-20 PROCEDURE — 25000242 PHARM REV CODE 250 ALT 637 W/ HCPCS: Mod: HCNC | Performed by: STUDENT IN AN ORGANIZED HEALTH CARE EDUCATION/TRAINING PROGRAM

## 2024-11-20 PROCEDURE — 3288F FALL RISK ASSESSMENT DOCD: CPT | Mod: HCNC,CPTII,S$GLB, | Performed by: INTERNAL MEDICINE

## 2024-11-20 PROCEDURE — 3066F NEPHROPATHY DOC TX: CPT | Mod: HCNC,CPTII,S$GLB, | Performed by: INTERNAL MEDICINE

## 2024-11-20 PROCEDURE — 1126F AMNT PAIN NOTED NONE PRSNT: CPT | Mod: HCNC,CPTII,S$GLB, | Performed by: INTERNAL MEDICINE

## 2024-11-20 PROCEDURE — 1159F MED LIST DOCD IN RCRD: CPT | Mod: HCNC,CPTII,S$GLB, | Performed by: INTERNAL MEDICINE

## 2024-11-20 PROCEDURE — 94640 AIRWAY INHALATION TREATMENT: CPT | Mod: HCNC

## 2024-11-20 PROCEDURE — 4010F ACE/ARB THERAPY RXD/TAKEN: CPT | Mod: HCNC,CPTII,S$GLB, | Performed by: INTERNAL MEDICINE

## 2024-11-20 PROCEDURE — 80053 COMPREHEN METABOLIC PANEL: CPT | Mod: HCNC | Performed by: STUDENT IN AN ORGANIZED HEALTH CARE EDUCATION/TRAINING PROGRAM

## 2024-11-20 PROCEDURE — 94760 N-INVAS EAR/PLS OXIMETRY 1: CPT | Mod: HCNC

## 2024-11-20 PROCEDURE — 3052F HG A1C>EQUAL 8.0%<EQUAL 9.0%: CPT | Mod: HCNC,CPTII,S$GLB, | Performed by: INTERNAL MEDICINE

## 2024-11-20 PROCEDURE — 99214 OFFICE O/P EST MOD 30 MIN: CPT | Mod: HCNC,S$GLB,, | Performed by: INTERNAL MEDICINE

## 2024-11-20 PROCEDURE — 93005 ELECTROCARDIOGRAM TRACING: CPT | Mod: HCNC

## 2024-11-20 PROCEDURE — 2023F DILAT RTA XM W/O RTNOPTHY: CPT | Mod: HCNC,CPTII,S$GLB, | Performed by: INTERNAL MEDICINE

## 2024-11-20 PROCEDURE — 1160F RVW MEDS BY RX/DR IN RCRD: CPT | Mod: HCNC,CPTII,S$GLB, | Performed by: INTERNAL MEDICINE

## 2024-11-20 PROCEDURE — 1111F DSCHRG MED/CURRENT MED MERGE: CPT | Mod: HCNC,CPTII,S$GLB, | Performed by: INTERNAL MEDICINE

## 2024-11-20 PROCEDURE — 96365 THER/PROPH/DIAG IV INF INIT: CPT | Mod: HCNC

## 2024-11-20 PROCEDURE — 83735 ASSAY OF MAGNESIUM: CPT | Mod: HCNC | Performed by: STUDENT IN AN ORGANIZED HEALTH CARE EDUCATION/TRAINING PROGRAM

## 2024-11-20 PROCEDURE — 99285 EMERGENCY DEPT VISIT HI MDM: CPT | Mod: 25,HCNC

## 2024-11-20 PROCEDURE — 85025 COMPLETE CBC W/AUTO DIFF WBC: CPT | Mod: HCNC | Performed by: STUDENT IN AN ORGANIZED HEALTH CARE EDUCATION/TRAINING PROGRAM

## 2024-11-20 PROCEDURE — 3062F POS MACROALBUMINURIA REV: CPT | Mod: HCNC,CPTII,S$GLB, | Performed by: INTERNAL MEDICINE

## 2024-11-20 PROCEDURE — 83880 ASSAY OF NATRIURETIC PEPTIDE: CPT | Mod: HCNC | Performed by: STUDENT IN AN ORGANIZED HEALTH CARE EDUCATION/TRAINING PROGRAM

## 2024-11-20 PROCEDURE — 3074F SYST BP LT 130 MM HG: CPT | Mod: HCNC,CPTII,S$GLB, | Performed by: INTERNAL MEDICINE

## 2024-11-20 PROCEDURE — 96366 THER/PROPH/DIAG IV INF ADDON: CPT | Mod: HCNC

## 2024-11-20 RX ORDER — IPRATROPIUM BROMIDE AND ALBUTEROL SULFATE 2.5; .5 MG/3ML; MG/3ML
3 SOLUTION RESPIRATORY (INHALATION)
Status: COMPLETED | OUTPATIENT
Start: 2024-11-20 | End: 2024-11-20

## 2024-11-20 RX ORDER — MAGNESIUM SULFATE HEPTAHYDRATE 40 MG/ML
2 INJECTION, SOLUTION INTRAVENOUS ONCE
Status: COMPLETED | OUTPATIENT
Start: 2024-11-20 | End: 2024-11-20

## 2024-11-20 RX ADMIN — IPRATROPIUM BROMIDE AND ALBUTEROL SULFATE 3 ML: 2.5; .5 SOLUTION RESPIRATORY (INHALATION) at 11:11

## 2024-11-20 RX ADMIN — MAGNESIUM SULFATE HEPTAHYDRATE 2 G: 40 INJECTION, SOLUTION INTRAVENOUS at 11:11

## 2024-11-20 NOTE — DISCHARGE INSTRUCTIONS
Stop taking carvedilol and start taking metoprolol, there is a prescription waiting at your pharmacy.     Please return to the Emergency Department for any new or worsening symptoms including: fever, chest pain, shortness of breath, loss of consciousness, dizziness, weakness, or any other concerns.     Please follow up with your Primary Care Provider within in the week. If you do not have one, you may contact the one listed on your discharge paperwork or you may also call the [facility] Appointment Desk at [phone] to schedule an appointment with one.     Please take all medication as prescribed.

## 2024-11-20 NOTE — PROGRESS NOTES
HISTORY OF PRESENT ILLNESS:  Emmanuel Grant is a 73 y.o. male who presents to the clinic today for Hospital Follow Up  .     History of Present Illness    Emmanuel presents today for follow-up with concerns about low blood pressure.    Last seen by me 8/2024.  Since then multiple hospital encounters.    Most recently admitted 11/5/24-11/11/24.  Pulmonology appt scheduling pending.    He reports feeling lightheaded when getting up from a seated position. His home blood pressure readings are typically around 100/75-78 in the mornings. He has an upcoming appointment with Dr. Ventura, his cardiologist, in December for management of his heart conditions.    He has a diagnosis of pulmonary fibrosis and is currently being followed by Dr. Schmitt. He needs to schedule an appointment with a pulmonologist.    He takes multiple medications. Morning medications include metformin, glimepiride, atorvastatin, furosemide, carvedilol, famotidine, mirabegron, and isosorbide mononitrate. Evening medications include amlodipine, prednisone, and potassium. He expresses confusion about his medication regimen, particularly regarding timing and frequency for carvedilol. He is uncertain whether he is taking both metoprolol and carvedilol or just one of them. He reports taking carvedilol daily in the morning, despite it being prescribed twice daily. There is also confusion about the status of metoprolol on his medication list.  He also is noted to have two bottles of amlodipine of 5 mg and 10 mg and it is unclear if he is taking both or one.    D/C SUMMARY:  HPI:   73 y.o. man with h/o NIDDM type 2 (A1c 8 in September 2024), CAD (with h/o stent), Essential HTN, h/o CVA, ILD complicated by chronic hypoxic respiratory failure (on 2L NC at home) and recently treated with MMF 500mg PO BID (started 9/2024) presents to the ER with worsening SOB and cough. COVID/Flu negative. Finished a round of Azithromycin. NO c/o Fevers or chills. NO N/V/Diarrhea.  No chest pain. States can't stop sneezing or coughing.  [place] [facility] noted Sats upper 80s on his home 2L NC. D/w pulmonary on call Dr. Pérez who recommended transfer to [facility] and to start steroids, Levaquin 750mg IV qday and scheduled/PRN Duonebs.   Labs with normal WBC and stable H/H. Lytes unremarkable and renal function stable.  Glucose 147. VBG 7.3. D-Dimer normal and BNP normal Troponin negative x1 with NO STEMI on EKG.      CXR with:   Monitoring EKG leads are present. The trachea is unremarkable. The cardiomediastinal silhouette is stable. There is no evidence of free air beneath the hemidiaphragms. There are no pleural effusions. There is no evidence of a pneumothorax. There is no evidence of pneumomediastinum. There is increase in the appearance of diffuse bilateral airspace opacities. There are degenerative changes in the osseous structures.           Hospital Course:   Patient admitted for acute on chronic respiratory failure. Evaluated by Pulmonology who recommended continued diuresis as tolerated, Solu-Medrol, increasing CellCept dose and continous titration of O2 to maintain saturations >90%. Patient euvolemic on exam and without signs of infection throughout admission. Antibiotics continued given patient's history of ILD and immunosuppressive therapy. Received 5 day course of IV Levaquin while inpatient, will not continue treatment on discharge given minimal suspicion for infection. Patient reported gradual improvement of SOB with treatment initiated therapy recommended by Pulmonology. He is now able to ambulate to bathroom and around hospital room without worsening of symptoms. Adequate saturations on home 2L. Walk test completed before discharge. Patient saturations noted at 94% on home 2L with exercise. Will discharge on home O2, increase not needed. Will discharge with Pulmonology recommendations of steroid taper, increased CellCept dose, budesonide and alformeterol nebulizers. Will also  discharge on 3 days of Potassium, given repeated hypokalemia with albuterol treatments. BMP to be rechecked within 1 week. Patient is hemodynamically stable for discharge with PCP and Pulmonology follow-up appointments.           He lives alone and has been  for a long time. He has family support, with siblings visiting him daily.    He has a general surgery appointment on November 25th.    On metformin and glimepiride.  Last seen by endo 8/2024.    Hemoglobin A1C   Date Value Ref Range Status   09/25/2024 8.0 (H) 4.0 - 5.6 % Final     Comment:     ADA Screening Guidelines:  5.7-6.4%  Consistent with prediabetes  >or=6.5%  Consistent with diabetes    High levels of fetal hemoglobin interfere with the HbA1C  assay. Heterozygous hemoglobin variants (HbS, HgC, etc)do  not significantly interfere with this assay.   However, presence of multiple variants may affect accuracy.     08/05/2024 8.3 (H) 4.0 - 5.6 % Final     Comment:     ADA Screening Guidelines:  5.7-6.4%  Consistent with prediabetes  >or=6.5%  Consistent with diabetes    High levels of fetal hemoglobin interfere with the HbA1C  assay. Heterozygous hemoglobin variants (HbS, HgC, etc)do  not significantly interfere with this assay.   However, presence of multiple variants may affect accuracy.     06/16/2024 7.4 (H) 4.0 - 5.6 % Final     Comment:     ADA Screening Guidelines:  5.7-6.4%  Consistent with prediabetes  >or=6.5%  Consistent with diabetes    High levels of fetal hemoglobin interfere with the HbA1C  assay. Heterozygous hemoglobin variants (HbS, HgC, etc)do  not significantly interfere with this assay.   However, presence of multiple variants may affect accuracy.                 ROS:  General: -fever, -chills, -fatigue, -weight gain, -weight loss  Eyes: -vision changes, -redness, -discharge  ENT: -ear pain, -nasal congestion, -sore throat  Cardiovascular: -chest pain, -palpitations, -lower extremity edema  Respiratory: -cough, -shortness of  breath  Gastrointestinal: -abdominal pain, -nausea, -vomiting, -diarrhea, -constipation, -blood in stool  Genitourinary: -dysuria, -hematuria, -frequency  Musculoskeletal: -joint pain, -muscle pain  Skin: -rash, -lesion  Neurological: -headache, +dizziness, -numbness, -tingling, +lightheadedness  Psychiatric: -anxiety, -depression, -sleep difficulty             PAST MEDICAL HISTORY:  Past Medical History:   Diagnosis Date    CAD (coronary artery disease)     Sees [facility], h/o stent    Diabetes mellitus     Hypertension     Kidney stone     Stroke     age 62       PAST SURGICAL HISTORY:  Past Surgical History:   Procedure Laterality Date    24 HOUR IMPEDANCE PH MONITORING OF ESOPHAGUS IN PATIENT NOT TAKING ACID REDUCING MEDICATIONS N/A 11/13/2024    Procedure: IMPEDANCE PH STUDY, ESOPHAGEAL, 24 HOUR, IN PATIENT NOT TAKING ACID REDUCING MEDICATION;  Surgeon: Stephany Mendoza MD;  Location: [facility] (4TH FLR);  Service: Endoscopy;  Laterality: N/A;  referral dr miguel/ off ppi / prep inst sent to pt via mail  11/6- precall attempted no answer. Unable to [facility]-  11/8 - pt not called, per chart review patient currently inpatient at [place] [id number]    CARDIAC CATHETERIZATION      with stent    COLONOSCOPY N/A 03/27/2024    Procedure: COLONOSCOPY;  Surgeon: Ariela Castro MD;  Location: [facility];  Service: Endoscopy;  Laterality: N/A;    ESOPHAGEAL MANOMETRY WITH MEASUREMENT OF IMPEDANCE N/A 11/13/2024    Procedure: MANOMETRY, ESOPHAGUS, WITH IMPEDANCE MEASUREMENT;  Surgeon: Stephany Mendoza MD;  Location: [facility] (4TH FLR);  Service: Endoscopy;  Laterality: N/A;    ESOPHAGOGASTRODUODENOSCOPY N/A 03/27/2024    Procedure: EGD (ESOPHAGOGASTRODUODENOSCOPY);  Surgeon: Ariela Castro MD;  Location: [facility] ENDO;  Service: Endoscopy;  Laterality: N/A;    LEFT HEART CATHETERIZATION Left 9/10/2024    Procedure: Left heart cath;  Surgeon: Jemal Drummond MD;  Location: [facility] CATH LAB;  Service: Cardiology;   Laterality: Left;    SHOULDER SURGERY Right        SOCIAL HISTORY:  Social History     Socioeconomic History    Marital status: Single    Number of children: 3   Occupational History    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   Tobacco Use    Smoking status: Never     Passive exposure: Never    Smokeless tobacco: Never   Substance and Sexual Activity    Alcohol use: No    Drug use: Never    Sexual activity: Not Currently     Social Drivers of Health     Financial Resource Strain: Patient Declined (11/5/2024)    Overall Financial Resource Strain (CARDIA)     Difficulty of Paying Living Expenses: Patient declined   Recent Concern: Financial Resource Strain - High Risk (9/25/2024)    Overall Financial Resource Strain (CARDIA)     Difficulty of Paying Living Expenses: Very hard   Food Insecurity: Patient Declined (11/5/2024)    Hunger Vital Sign     Worried About Running Out of Food in the Last Year: Patient declined     Ran Out of Food in the Last Year: Patient declined   Recent Concern: Food Insecurity - Food Insecurity Present (9/25/2024)    Hunger Vital Sign     Worried About Running Out of Food in the Last Year: Often true     Ran Out of Food in the Last Year: Often true   Transportation Needs: Patient Declined (11/5/2024)    TRANSPORTATION NEEDS     Transportation : Patient declined   Physical Activity: Insufficiently Active (10/12/2024)    Exercise Vital Sign     Days of Exercise per Week: 3 days     Minutes of Exercise per Session: 30 min   Stress: Patient Declined (11/5/2024)    [employer] - Occupational Stress Questionnaire     Feeling of Stress : Patient declined   Housing Stability: Patient Declined (11/5/2024)    Housing Stability Vital Sign     Unable to Pay for Housing in the Last Year: Patient declined     Homeless in the Last Year: Patient declined       FAMILY HISTORY:  Family History   Problem Relation Name Age of Onset    Cancer Mother      Colon cancer Sister       "Esophageal cancer Neg Hx         ALLERGIES AND MEDICATIONS: updated and reviewed.  Review of patient's allergies indicates:   Allergen Reactions    Dapagliflozin      Other reaction(s): Other (See Comments)    Pcn [penicillins]     Linagliptin Other (See Comments)     "it knocked me down", "it almost killed me"    Lisinopril Other (See Comments)     cough    Pantoprazole Hives     Medication List with Changes/Refills   Current Medications    ALBUTEROL-IPRATROPIUM (DUO-NEB) 2.5 MG-0.5 MG/3 ML NEBULIZER SOLUTION    Take 3 mLs by nebulization every 6 (six) hours as needed for Wheezing. Rescue    ALPHA-D-GALACTOSIDASE (BEANO ORAL)    Take 1 tablet by mouth 2 (two) times a day.    AMLODIPINE (NORVASC) 10 MG TABLET    Take 1 tablet (10 mg total) by mouth once daily.    ARFORMOTEROL (BROVANA) 15 MCG/2 ML NEBU    Take 2 mLs (15 mcg total) by nebulization 2 (two) times daily. Controller    ASPIRIN 81 MG CHEW    Take 81 mg by mouth once daily.    ATORVASTATIN (LIPITOR) 40 MG TABLET    Take 1 tablet (40 mg total) by mouth every evening.    BUDESONIDE (PULMICORT) 0.5 MG/2 ML NEBULIZER SOLUTION    Take 4 mLs (1 mg total) by nebulization 2 (two) times daily. Controller    CHOLECALCIFEROL, VITAMIN D3, (VITAMIN D3) 50 MCG (2,000 UNIT) CAP CAPSULE    Take 1 capsule by mouth once daily.    FAMOTIDINE (PEPCID) 40 MG TABLET    Take 1 tablet (40 mg total) by mouth 2 (two) times daily.    FLUTICASONE-SALMETEROL DISKUS INHALER 250-50 MCG    Inhale 1 puff into the lungs 2 (two) times daily. Controller    FUROSEMIDE (LASIX) 20 MG TABLET    Take 1 tablet (20 mg total) by mouth once daily.    GLIMEPIRIDE (AMARYL) 2 MG TABLET    Take 1 tablet (2 mg total) by mouth 2 (two) times a day. Take with meals    ISOSORBIDE MONONITRATE (IMDUR) 30 MG 24 HR TABLET    Take 1 tablet (30 mg total) by mouth once daily.    METFORMIN (GLUCOPHAGE) 1000 MG TABLET    Take 1 tablet (1,000 mg total) by mouth 2 (two) times daily with meals.    METOPROLOL SUCCINATE " (TOPROL-XL) 50 MG 24 HR TABLET    Take 1 tablet (50 mg total) by mouth once daily.    MIRABEGRON (MYRBETRIQ) 25 MG TB24 ER TABLET    Take 1 tablet (25 mg total) by mouth once daily.    MYCOPHENOLATE (CELLCEPT) 250 MG CAP    Take 4 capsules (1,000 mg total) by mouth 2 (two) times daily.    NITROGLYCERIN (NITROSTAT) 0.3 MG SL TABLET    Place 1 tablet (0.3 mg total) under the tongue every 5 (five) minutes as needed for Chest pain.    PREDNISONE (DELTASONE) 20 MG TABLET    Take 3 tablets (60 mg total) by mouth once daily for 5 days, THEN 2 tablets (40 mg total) once daily for 5 days, THEN 1 tablet (20 mg total) once daily for 5 days, THEN 0.5 tablets (10 mg total) once daily for 5 days.    SACUBITRIL-VALSARTAN (ENTRESTO) 24-26 MG PER TABLET    Take 1 tablet by mouth 2 (two) times daily.    SUCRALFATE (CARAFATE) 1 GRAM TABLET    Take 1 tablet (1 g total) by mouth 4 (four) times daily before meals and nightly.    TAMSULOSIN (FLOMAX) 0.4 MG CAP    Take 1 capsule (0.4 mg total) by mouth once daily.          CARE TEAM:  Patient Care Team:  Wilfredo De Souza MD as PCP - General (Internal Medicine)         PHYSICAL EXAM:   Vitals:    11/20/24 0910   BP: (!) 82/60   Pulse:    Temp:              There is no height or weight on file to calculate BMI.    Physical Exam    Vitals: Low blood pressure.  General: No acute distress. Well-developed. Well-nourished.  Eyes: EOMI. Sclerae anicteric.  Ears: Bilateral TMs clear. Bilateral EACs clear.  Cardiovascular: Regular rate. Regular rhythm. No murmurs. No rubs. No gallops. Normal S1, S2.  Respiratory: Normal respiratory effort. Wheezing bilaterally.  Musculoskeletal: No  obvious deformity.  Extremities: No lower extremity edema.  Neurological: Alert & oriented x3. No slurred speech. Normal gait.  Psychiatric: Normal mood. Normal affect. Good insight. Good judgment.  Skin: Warm. Dry. No rash.             ASSESSMENT AND PLAN:  Assessment & Plan    Concerned about low blood pressure  (70/58), significantly lower than usual (100/75-78)  Considered adjustment of blood pressure medications due to low readings and patient's lightheadedness  Noted confusion regarding current medication regimen, particularly beta-blockers (metoprolol vs. carvedilol) and amlodipine dosing  Emphasized importance of clarifying medication regimen due to patient's history of heart failure and stent placement  Repeat blood pressure remains low and he is being referred to ED today in setting of lightheadedness and blurry vision symptoms.      Interstitial lung disease exacerbation  Essential hypertension  Coronary artery disease involving native coronary artery of native heart with angina pectoris  Mixed hyperlipidemia  Chronic HFrEF (heart failure with reduced ejection fraction)  Type 2 diabetes mellitus without complication, without long-term current use of insulin  Gastroesophageal reflux disease, unspecified whether esophagitis present  Drug-induced immunodeficiency  Chronic hypoxemic respiratory failure    - Discontinued 1 of the 2 amlodipine prescriptions (5mg and 10mg) to ensure patient only takes the prescribed 10mg dose - this may need to be adjusted based on BP readings moving forward.  - Continued current medications pending clarification of regimen, particularly beta-blockers and amlodipine.  - Rechecked blood pressure.  - He is to bring all medications to all medical visits with continue monitoring of BP daily at home.    - Previously referred to Dr. Ventura (cardiologist) to address heart failure management and blood pressure issues.  - Previously referred to Dr. Janet Schmitt (pulmonologist) for pulmonary fibrosis follow-up.  - Follow up with Dr. Ventura (cardiologist) as scheduled.    - Patient is being referred to [facility] as well as Case Management.    Patient to follow up in our clinic in upcoming month.  Patient is being referred to ED today for low BP readings today in setting of lightheadedness  symptoms.    This note was generated with the assistance of ambient listening technology. Verbal consent was obtained by the patient and accompanying visitor(s) for the recording of patient appointment to facilitate this note. I attest to having reviewed and edited the generated note for accuracy, though some syntax or spelling errors may persist. Please contact the author of this note for any clarification.

## 2024-11-20 NOTE — ED PROVIDER NOTES
"Encounter Date: 11/20/2024       History     Chief Complaint   Patient presents with    Shortness of Breath     Pt BIB EMS, c/o SOB and hypotension. Pt was picked up from doctor's office and was recently diagnosed with a lung infection. Pt denies any CP, abd pain or n/v/d    Hypotension       Patient is a 73-year-old with a history of NIDDM type 2 (A1c 8 in September 2024), CAD (with h/o stent), Essential HTN, h/o CVA, ILD complicated by chronic hypoxic respiratory failure (on 2L NC at home), on cellcept who presents to the ED from his pcp with hypotension and dizziness. Patient was discharged from this hospital on 11/11 after an admission for shortness of breath, concerning for an ILD flare  And was also treated for a possible respiratory infection.  Patient initially did well after leaving the hospital but a few days ago began to have worsening shortness of breath again.  He spoke with his doctors and was prescribed prednisone. He went to his PCP this morning.  He was in his usual state of health and felt well.  He drove himself to the appointment.  On arrival to the appointment he was found to be hypotensive in the 70s to 80s systolic.  At that time he said he felt lightheaded and dizzy.  His PCP is concerned for polypharmacy with his antihypertensives.  He received 300 cc IV fluid with EMS and his symptoms have all resolved. Patient reports compliance with his amlodipine last night, his Entresto and carvedilol this morning.    The history is provided by the patient and the EMS personnel. No  was used.     Review of patient's allergies indicates:   Allergen Reactions    Dapagliflozin      Other reaction(s): Other (See Comments)    Pcn [penicillins]     Linagliptin Other (See Comments)     "it knocked me down", "it almost killed me"    Lisinopril Other (See Comments)     cough    Pantoprazole Hives     Past Medical History:   Diagnosis Date    CAD (coronary artery disease)     Sees [facility], h/o " stent    Diabetes mellitus     Hypertension     Kidney stone     Stroke     age 62     Past Surgical History:   Procedure Laterality Date    24 HOUR IMPEDANCE PH MONITORING OF ESOPHAGUS IN PATIENT NOT TAKING ACID REDUCING MEDICATIONS N/A 11/13/2024    Procedure: IMPEDANCE PH STUDY, ESOPHAGEAL, 24 HOUR, IN PATIENT NOT TAKING ACID REDUCING MEDICATION;  Surgeon: Stephany Mendoza MD;  Location: [facility] (4TH FLR);  Service: Endoscopy;  Laterality: N/A;  referral dr miguel/ off ppi / prep inst sent to pt via mail  11/6- precall attempted no answer. Unable to [facility]-  11/8 - pt not called, per chart review patient currently inpatient at [place] [address]    CARDIAC CATHETERIZATION      with stent    COLONOSCOPY N/A 03/27/2024    Procedure: COLONOSCOPY;  Surgeon: Ariela Castro MD;  Location: [facility];  Service: Endoscopy;  Laterality: N/A;    ESOPHAGEAL MANOMETRY WITH MEASUREMENT OF IMPEDANCE N/A 11/13/2024    Procedure: MANOMETRY, ESOPHAGUS, WITH IMPEDANCE MEASUREMENT;  Surgeon: Stephany Mendoza MD;  Location: [facility] (4TH FLR);  Service: Endoscopy;  Laterality: N/A;    ESOPHAGOGASTRODUODENOSCOPY N/A 03/27/2024    Procedure: EGD (ESOPHAGOGASTRODUODENOSCOPY);  Surgeon: Ariela Castro MD;  Location: [facility];  Service: Endoscopy;  Laterality: N/A;    LEFT HEART CATHETERIZATION Left 9/10/2024    Procedure: Left heart cath;  Surgeon: Jemal Drummond MD;  Location: [facility] CATH LAB;  Service: Cardiology;  Laterality: Left;    SHOULDER SURGERY Right      Family History   Problem Relation Name Age of Onset    Cancer Mother      Colon cancer Sister      Esophageal cancer Neg Hx       Social History     Tobacco Use    Smoking status: Never     Passive exposure: Never    Smokeless tobacco: Never   Substance Use Topics    Alcohol use: No    Drug use: Never     Review of Systems   Constitutional:  Negative for fever.   HENT:  Negative for sore throat.    Respiratory:  Negative for shortness of breath.     Cardiovascular:  Negative for chest pain.   Gastrointestinal:  Negative for nausea.   Genitourinary:  Negative for dysuria.   Musculoskeletal:  Negative for back pain.   Skin:  Negative for rash.   Neurological:  Negative for weakness.       Physical Exam     Initial Vitals [11/20/24 1001]   BP Pulse Resp Temp SpO2   104/70 84 18 97.5 °F (36.4 °C) 96 %      MAP       --         Physical Exam    Constitutional: Vital signs are normal. No distress.   HENT:   Head: Normocephalic and atraumatic.   Eyes: EOM are normal.   Neck:   Normal range of motion.  Cardiovascular:  Normal rate, regular rhythm and intact distal pulses.           Pulmonary/Chest: Breath sounds normal. No respiratory distress.   Occasional cough   Abdominal: Abdomen is soft. There is no abdominal tenderness.   Musculoskeletal:         General: No edema. Normal range of motion.      Cervical back: Normal range of motion.     Neurological: He is alert and oriented to person, place, and time. GCS score is 15. GCS eye subscore is 4. GCS verbal subscore is 5. GCS motor subscore is 6.   No focal neurologic deficits   Skin: Skin is warm and dry.         ED Course   Procedures  Labs Reviewed   CBC W/ AUTO DIFFERENTIAL - Abnormal       Result Value    WBC 13.69 (*)     RBC 4.17 (*)     Hemoglobin 12.0 (*)     Hematocrit 35.0 (*)     MCV 84      MCH 28.8      MCHC 34.3      RDW 13.6      Platelets 211      MPV 10.4      Immature Granulocytes 0.7 (*)     Gran # (ANC) 12.0 (*)     Immature Grans (Abs) 0.09 (*)     Lymph # 0.9 (*)     Mono # 0.6      Eos # 0.0      Baso # 0.01      nRBC 0      Gran % 87.7 (*)     Lymph % 6.8 (*)     Mono % 4.7      Eosinophil % 0.0      Basophil % 0.1      Differential Method Automated     COMPREHENSIVE METABOLIC PANEL - Abnormal    Sodium 134 (*)     Potassium 4.8      Chloride 104      CO2 19 (*)     Glucose 375 (*)     BUN 26 (*)     Creatinine 1.2      Calcium 8.8      Total Protein 6.0      Albumin 2.9 (*)     Total  "Bilirubin 0.4      Alkaline Phosphatase 49      AST 14      ALT 23      eGFR >60      Anion Gap 11     MAGNESIUM - Abnormal    Magnesium 1.2 (*)    B-TYPE NATRIURETIC PEPTIDE    BNP 95          ECG Results              EKG 12-lead (Final result)        Collection Time Result Time QRS Duration OHS QTC Calculation    11/20/24 12:03:08 11/20/24 21:34:04 108 450                     Final result by Interface, Lab In [facility] (11/20/24 21:34:06)                   Narrative:    Test Reason : I95.9,    Vent. Rate :  82 BPM     Atrial Rate :  82 BPM     P-R Int : 196 ms          QRS Dur : 108 ms      QT Int : 386 ms       P-R-T Axes :  53  65  84 degrees    QTcB Int : 450 ms    Normal sinus rhythm  Septal infarct Abnormal ECG  When compared with ECG of 05-Nov-2024 22:53,  Significant changes have occurred  Confirmed by Brandon Ventura ([id number]) on 11/20/2024 9:33:58 PM    Referred By: AAAREFERRAL SELF           Confirmed By: Barndon Ventura                                  Imaging Results              X-Ray Chest AP Portable (Final result)  Result time 11/20/24 12:04:38      Final result by Raheem Bhat MD (11/20/24 12:04:38)                   Impression:      Findings suggestive of chronic interstitial lung disease.  No new large focal consolidation identified on this limited single view.      Electronically signed by: Raheem Bhat MD  Date:    11/20/2024  Time:    12:04               Narrative:    EXAMINATION:  XR CHEST AP PORTABLE    CLINICAL HISTORY:  Provided history is "  Shortness of breath".    TECHNIQUE:  One view of the chest.    COMPARISON:  11/05/2024.    FINDINGS:  Lung volumes are low, accentuating basilar markings.  Cardiomediastinal silhouette is not significantly enlarged.  There are diffuse bilateral interstitial opacities with overall similar appearance to the prior exam.  Probable chronic interstitial lung disease better evaluated on prior CT chest.  Mild pulmonary emphysema also a consideration.  " No new large focal consolidation.  No sizable pleural effusion.  No pneumothorax.                                       Medications   albuterol-ipratropium 2.5 mg-0.5 mg/3 mL nebulizer solution 3 mL (3 mLs Nebulization Given 11/20/24 1115)   magnesium sulfate 2g in water 50mL IVPB (premix) (0 g Intravenous Stopped 11/20/24 1349)     Medical Decision Making  Emmanuel Grant is a 73 y.o. male with h/o DM, CAD, ILD, HTN who presents to the ED with an episode of hypotension and dizziness that has resolved.     Initial vitals normal on room air. Physical exam reveals a well appearing man with an occasional cough.     Ddx includes but is not limited to electrolyte abnormalities, rojas, hypovolemia, polypharmacy. Given history, physical exam, vital signs and chart review, there is low concern for cardiogenic shock, sepsis, ILD exacerbation.     I will obtain the following to better assess: cbc, cmp, mag, bnp, EKG, chest xray. Immediate interventions include breathing treatment for his cough.     DISCLAIMER: This note was prepared with [employer] voice recognition transcription software. Garbled syntax, mangled pronouns, and other bizarre constructions may be attributed to that software system.      Amount and/or Complexity of Data Reviewed  Labs: ordered.  Radiology: ordered.    Risk  Prescription drug management.               ED Course as of 11/22/24 1028   Wed Nov 20, 2024   1306  Patient remains asymptomatic.  Labs are notable for a magnesium of 1.2 which has been repleted.  Patient also has a mild leukocytosis and hyperglycemia, likely due to the new prednisone prescription. [KI]   1324  Per chart review, patient is not supposed to be on carvedilol any longer due to concerns for bronchospasm.  He is supposed to take metoprolol once daily.  Informed patient of this medication change and that he has metoprolol waiting for him at the pharmacy.  Patient in agreement with the plan.  Return precautions given.  Patient will  follow up outpatient [KI]      ED Course User Index  [KI] Flaca Garcia MD                           Clinical Impression:  Final diagnoses:  [I95.9] Hypotension  [R06.02] Shortness of breath          ED Disposition Condition    Discharge Stable          ED Prescriptions    None       Follow-up Information       Follow up With Specialties Details Why Contact Info    Wilfredo De Souza MD Internal Medicine Schedule an appointment as soon as possible for a visit   [address] [facility] [address]  [phone]               Flaca Garcia MD  11/22/24 5327

## 2024-11-21 ENCOUNTER — OFFICE VISIT (OUTPATIENT)
Dept: OPTOMETRY | Facility: CLINIC | Age: 74
End: 2024-11-21
Payer: COMMERCIAL

## 2024-11-21 DIAGNOSIS — H52.4 PRESBYOPIA: ICD-10-CM

## 2024-11-21 DIAGNOSIS — H25.13 NUCLEAR SCLEROSIS, BILATERAL: ICD-10-CM

## 2024-11-21 DIAGNOSIS — E11.9 TYPE 2 DIABETES MELLITUS WITHOUT RETINOPATHY: Primary | ICD-10-CM

## 2024-11-21 DIAGNOSIS — Z13.5 GLAUCOMA SCREENING: ICD-10-CM

## 2024-11-21 DIAGNOSIS — H40.9 GLAUCOMA OF BOTH EYES, UNSPECIFIED GLAUCOMA TYPE: ICD-10-CM

## 2024-11-21 PROCEDURE — 99999 PR PBB SHADOW E&M-EST. PATIENT-LVL III: CPT | Mod: PBBFAC,,, | Performed by: OPTOMETRIST

## 2024-11-21 NOTE — PROGRESS NOTES
"HPI    74 Y/o male is here for routine eye exam with C/o pt states about a year   ago he was seeing another doctor who gave him injection in OS eye pt   states after injection his vision begin to look blurry. Pt say's the   blurriness eventually comes across to the OD. Pt say's he taking an Rx gtt   once every night. Does not know the name of the medication. Pt wears otc   readers +2.75  Pt denies pain and discomfort   No f/f    Eye med:   Last edited by Gerry Ko MA on 11/21/2024 12:59 PM.            Assessment /Plan     For exam results, see Encounter Report.    Type 2 diabetes mellitus without retinopathy    Glaucoma of both eyes, unspecified glaucoma type    Nuclear sclerosis, bilateral    Glaucoma screening    Presbyopia      Pt is transferring eyecare from [facility] to [facility].  He has a number of issues:    Cat OU.  Pt happy w otc readers  Glaucoma hx--iop wnl on meds (pt not sure of med, and didn't bring bottle).  Has cupped out CD OS, but may be due to below:  Pt reports over this past summer his vision OS went bad suddenly.  Saw retina doc who told him he had a "stroke", and was doing injections which "haven't helped"    PLAN:    Cont glauc meds as prescribed  Rtc for glaucoma riley w Dr Palma.  He will bring med with him to that appt so we know what he is using  We will also send for [facility] notes, to evaluate their retinal findings and can sched pt with retina if needed                   "

## 2024-11-26 RX ORDER — BLOOD-GLUCOSE SENSOR
EACH MISCELLANEOUS
Qty: 2 EACH | Refills: 11 | Status: CANCELLED | OUTPATIENT
Start: 2024-11-26

## 2024-11-26 RX ORDER — HYDROCHLOROTHIAZIDE 12.5 MG/1
CAPSULE ORAL
Qty: 6 EACH | Refills: 3 | Status: SHIPPED | OUTPATIENT
Start: 2024-11-26

## 2024-11-29 ENCOUNTER — PATIENT OUTREACH (OUTPATIENT)
Dept: ADMINISTRATIVE | Facility: CLINIC | Age: 74
End: 2024-11-29
Payer: MEDICARE

## 2024-11-29 NOTE — TELEPHONE ENCOUNTER
Pt was still taking Cellcept 250 mg 2 capsules 2 times a day.      Pt. Was not aware of dosage change for the Cellcept.  Upon looking at his Rx bottle he noted the dosage as follows - Cellcept 250 mg - take 4 capsules (1000 mg) 2 times a day.    He was educated on the Rx and will start taking as prescribed upon D/C.

## 2024-11-29 NOTE — PROGRESS NOTES
C3 nurse spoke with Emmanuel Grant for a TCC post hospital discharge follow up call. The patient had a scheduled [facility] appointment with Wilfredo De Souza on 11/20/24 @ 0900.

## 2024-12-02 ENCOUNTER — OFFICE VISIT (OUTPATIENT)
Dept: SURGERY | Facility: CLINIC | Age: 74
End: 2024-12-02
Payer: MEDICARE

## 2024-12-02 VITALS
WEIGHT: 150.13 LBS | HEIGHT: 68 IN | BODY MASS INDEX: 22.75 KG/M2 | DIASTOLIC BLOOD PRESSURE: 72 MMHG | SYSTOLIC BLOOD PRESSURE: 103 MMHG | HEART RATE: 118 BPM | OXYGEN SATURATION: 93 %

## 2024-12-02 DIAGNOSIS — K21.9 GASTROESOPHAGEAL REFLUX DISEASE WITHOUT ESOPHAGITIS: Primary | ICD-10-CM

## 2024-12-02 PROCEDURE — 3062F POS MACROALBUMINURIA REV: CPT | Mod: HCNC,CPTII,S$GLB, | Performed by: SURGERY

## 2024-12-02 PROCEDURE — 3288F FALL RISK ASSESSMENT DOCD: CPT | Mod: HCNC,CPTII,S$GLB, | Performed by: SURGERY

## 2024-12-02 PROCEDURE — 4010F ACE/ARB THERAPY RXD/TAKEN: CPT | Mod: HCNC,CPTII,S$GLB, | Performed by: SURGERY

## 2024-12-02 PROCEDURE — 3066F NEPHROPATHY DOC TX: CPT | Mod: HCNC,CPTII,S$GLB, | Performed by: SURGERY

## 2024-12-02 PROCEDURE — 3008F BODY MASS INDEX DOCD: CPT | Mod: HCNC,CPTII,S$GLB, | Performed by: SURGERY

## 2024-12-02 PROCEDURE — 1159F MED LIST DOCD IN RCRD: CPT | Mod: HCNC,CPTII,S$GLB, | Performed by: SURGERY

## 2024-12-02 PROCEDURE — 3078F DIAST BP <80 MM HG: CPT | Mod: HCNC,CPTII,S$GLB, | Performed by: SURGERY

## 2024-12-02 PROCEDURE — 1101F PT FALLS ASSESS-DOCD LE1/YR: CPT | Mod: HCNC,CPTII,S$GLB, | Performed by: SURGERY

## 2024-12-02 PROCEDURE — 3074F SYST BP LT 130 MM HG: CPT | Mod: HCNC,CPTII,S$GLB, | Performed by: SURGERY

## 2024-12-02 PROCEDURE — 1126F AMNT PAIN NOTED NONE PRSNT: CPT | Mod: HCNC,CPTII,S$GLB, | Performed by: SURGERY

## 2024-12-02 PROCEDURE — 99214 OFFICE O/P EST MOD 30 MIN: CPT | Mod: HCNC,S$GLB,, | Performed by: SURGERY

## 2024-12-02 PROCEDURE — 3052F HG A1C>EQUAL 8.0%<EQUAL 9.0%: CPT | Mod: HCNC,CPTII,S$GLB, | Performed by: SURGERY

## 2024-12-02 PROCEDURE — 99999 PR PBB SHADOW E&M-EST. PATIENT-LVL IV: CPT | Mod: PBBFAC,HCNC,, | Performed by: SURGERY

## 2024-12-02 PROCEDURE — 1111F DSCHRG MED/CURRENT MED MERGE: CPT | Mod: HCNC,CPTII,S$GLB, | Performed by: SURGERY

## 2024-12-02 NOTE — PROGRESS NOTES
General Surgery  Follow up Visit    Interval Hx:  Had a normal HRM esophageal manometry  Impedence pH probe (done on medication unfortunately given he had recently been discharged and med rec was modified) reviewed - no symptom association and only mildly positive for upright reflux only. There was no significant recumbent reflux.    He does continue on famotidine, PPI and carafate and does feel his waterbrash / heartburn symptoms are better than prior. Also sitting upright longer after meals.  Continues to barbour shortness of breath. Has been admitted 5-6 times since I last saw him.  Has not had outpatient follow up with pulm or cardiology but has these coming up.     UGI reviewed - no hiatal hernia. Mild reflux with stress maneuvers.      Impedence PH probe:  DeMeester score 18.3  Reflux episode activity summary All reflux episodes: total episodes-28, upright episodes- 28, spupine-0 Proximal episodes: total 9, upright 9, supine 0   Symptom button was pressed once. SI 0.0, SAP 0.0   Findings: Total acid exposure time of 5.5% is inconclusive for GERD. Upright acid exposure time of 7.0% is positive for GERD. Note: This study was performed on acid reflux medications (Pepcid)      Chief Complaint: GERD    HPI:  Patient is a 73 y.o. male, referred by No ref. provider found, for evaluation of the above.    His greatest issue is acid brash throughout the day leading to coughing. Doesn't feel acid suppression medications help much but carafate before meals does help to prevent attacks of coughing and water brash after meals.     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 Never-smoker. Ambulatory for >200 yards but then some dyspnea requiring a few minutes to recover.    H/o CAD s/p stent 20y ago. Doing well. Had a recent LHC 9/10/2024 that demonstrated no occlusive disease and a patent stent. Also had a stroke around age 45/50 - had some LLE weakness that has mostly  resolved.    Has had a recent EGD, reviewed. By my estimate of the images, has a 2-3cm hiatal hernia without esophagitis.    Endoscopy reviewed:   Findings:       A hiatal hernia was present.        There is no endoscopic evidence of Ramirez's esophagus in the entire esophagus.        The entire examined stomach was normal.        The examined duodenum was normal.   Impression:            - Hiatal hernia.                          - Normal stomach.                          - Normal examined duodenum.                          - No specimens collected.   Recommendation:        - Discharge patient to home.                          - Follow an antireflux regimen.   Ariela Castro MD   3/27/2024 11:42:51 AM       GERD Severity Questionnaire     PPI, H2 Blocker or TUMS: Yes; Famotidine qHS; Protonix qD; carafate AC & hS.   Typical heartburn: Rarely; a few / month  Regurgitation: None  Dysphagia solids: None  Dysphagia liquids: None  Chest Pain / Pressure with eating / after meals: None  Hoarseness: A few times / week  Sore throat: None  Wake up coughing or choking: None  Asthma-like cough: Daily  Water brash: Many times / day, affects daily life  Globus / Frequent throat clearing: Daily  Nausea: None  Vomiting: None      No prior abdominal surgeries.    Medical History:  Past Medical History:   Diagnosis Date    CAD (coronary artery disease)     Sees [facility], h/o stent    Diabetes mellitus     Hypertension     Kidney stone     Stroke     age 62     Surgical History:  Past Surgical History:   Procedure Laterality Date    24 HOUR IMPEDANCE PH MONITORING OF ESOPHAGUS IN PATIENT NOT TAKING ACID REDUCING MEDICATIONS N/A 11/13/2024    Procedure: IMPEDANCE PH STUDY, ESOPHAGEAL, 24 HOUR, IN PATIENT NOT TAKING ACID REDUCING MEDICATION;  Surgeon: Stephany Mendoza MD;  Location: [facility] ([address] [place] [address]);  Service: Endoscopy;  Laterality: N/A;  referral dr miguel/ off ppi / prep inst sent to pt via mail  11/6- precall attempted no  "answer. Unable to [facility]-  11/8 - pt not called, per chart review patient currently inpatient at [place] [address]    CARDIAC CATHETERIZATION      with stent    COLONOSCOPY N/A 03/27/2024    Procedure: COLONOSCOPY;  Surgeon: Ariela Castro MD;  Location: [facility];  Service: Endoscopy;  Laterality: N/A;    ESOPHAGEAL MANOMETRY WITH MEASUREMENT OF IMPEDANCE N/A 11/13/2024    Procedure: MANOMETRY, ESOPHAGUS, WITH IMPEDANCE MEASUREMENT;  Surgeon: Stephany Mendoza MD;  Location: [facility] ENDO (4TH FLR);  Service: Endoscopy;  Laterality: N/A;    ESOPHAGOGASTRODUODENOSCOPY N/A 03/27/2024    Procedure: EGD (ESOPHAGOGASTRODUODENOSCOPY);  Surgeon: Ariela Castro MD;  Location: [facility];  Service: Endoscopy;  Laterality: N/A;    LEFT HEART CATHETERIZATION Left 9/10/2024    Procedure: Left heart cath;  Surgeon: Jemal Drummond MD;  Location: [facility] CATH LAB;  Service: Cardiology;  Laterality: Left;    SHOULDER SURGERY Right      Social History:  Social History     Tobacco Use    Smoking status: Never     Passive exposure: Never    Smokeless tobacco: Never   Substance Use Topics    Alcohol use: No    Drug use: Never       Review of Systems:  See HPI for pertinent positives and negatives.    Physical Exam:  /72   Pulse (!) 118   Ht 5' 8" (1.727 m)   Wt 68.1 kg (150 lb 2.1 oz)   SpO2 (!) 93%   BMI 22.83 kg/m²   Physical Exam  Vitals reviewed.   Constitutional:       General: He is not in acute distress.     Appearance: He is normal weight.   Cardiovascular:      Rate and Rhythm: Normal rate.   Pulmonary:      Effort: Pulmonary effort is normal. No respiratory distress.      Comments: Has his O2 concentrator with him, not currently using.   No accessory muscle use.  Abdominal:      Palpations: Abdomen is soft.      Tenderness: There is no abdominal tenderness.   Neurological:      General: No focal deficit present.      Mental Status: He is alert and oriented to person, place, and time.   Psychiatric:         " Mood and Affect: Mood normal.         Behavior: Behavior normal.       Assessment/Plan:  Emmanuel was seen today for shortness of breath.    Diagnoses and all orders for this visit:    Gastroesophageal reflux disease without esophagitis      UGI reviewed - no significant hiatal hernia seen and mild reflux only during provocative maneuvers.  He symptomatically feels his medications are currently better controlling his symptoms.  His impedence pH study did not convincingly demonstrate significant reflux or any symptoms association - this was done on medications however my review of his study data sheet suggests non-acid (neutralized fluid) reflux was not a significant finding during the study either. There is therefore no convincing evidence to suggest he would benefit greatly from what would likely be a high risk surgery for him given his cardiopulmonary symptoms.  He has had numerous admissions over the past few months alone related to dyspnea and SOB.  Discussed smaller, more frequent meals; spacing liquids and solids; not eating within a few hours of lying down and sleeping propped up if necessary.  Continue acid suppression.    We discussed no surgical intervention at this time.  However, if his dyspnea were to be significantly improved but he had more reflux in the future where medications were inadequately controlling symptoms, we could consider repeating objective pH testing off medications at that time.      I spent 30 minutes on this patient encounter. This includes times spent with the patient involving coordination of care and counseling, discussion of the patient's condition, delineating the plan of management above, discussing the rationale / recommendations for treatment, chart review and documentation. All time accounted for was spent on the day of service.    Sintia Juarez MD FACS  Minimally Invasive General & Bariatric Surgery  [facility] [place], [place]

## 2024-12-03 ENCOUNTER — OUTPATIENT CASE MANAGEMENT (OUTPATIENT)
Dept: ADMINISTRATIVE | Facility: OTHER | Age: 74
End: 2024-12-03
Payer: MEDICARE

## 2024-12-04 ENCOUNTER — OUTPATIENT CASE MANAGEMENT (OUTPATIENT)
Dept: ADMINISTRATIVE | Facility: OTHER | Age: 74
End: 2024-12-04
Payer: MEDICARE

## 2024-12-04 NOTE — PROGRESS NOTES
Pt reports that he will like to enroll with [facility]. Voiced that he is having a difficult time talking right now and is requesting a callback on tomorrow to complete the assessment.

## 2024-12-10 ENCOUNTER — OFFICE VISIT (OUTPATIENT)
Dept: HOME HEALTH SERVICES | Facility: CLINIC | Age: 74
End: 2024-12-10
Payer: MEDICARE

## 2024-12-10 ENCOUNTER — TELEPHONE (OUTPATIENT)
Dept: PULMONOLOGY | Facility: CLINIC | Age: 74
End: 2024-12-10
Payer: MEDICARE

## 2024-12-10 VITALS — HEART RATE: 72 BPM | TEMPERATURE: 97 F | OXYGEN SATURATION: 83 % | RESPIRATION RATE: 20 BRPM

## 2024-12-10 DIAGNOSIS — I10 ESSENTIAL HYPERTENSION: ICD-10-CM

## 2024-12-10 DIAGNOSIS — J96.11 CHRONIC HYPOXEMIC RESPIRATORY FAILURE: ICD-10-CM

## 2024-12-10 DIAGNOSIS — J84.9 INTERSTITIAL LUNG DISEASE: Primary | ICD-10-CM

## 2024-12-10 DIAGNOSIS — J84.9 INTERSTITIAL LUNG DISEASE: ICD-10-CM

## 2024-12-10 PROCEDURE — 1111F DSCHRG MED/CURRENT MED MERGE: CPT | Mod: CPTII,S$GLB,, | Performed by: NURSE PRACTITIONER

## 2024-12-10 PROCEDURE — 3066F NEPHROPATHY DOC TX: CPT | Mod: CPTII,S$GLB,, | Performed by: NURSE PRACTITIONER

## 2024-12-10 PROCEDURE — 1159F MED LIST DOCD IN RCRD: CPT | Mod: CPTII,S$GLB,, | Performed by: NURSE PRACTITIONER

## 2024-12-10 PROCEDURE — 99350 HOME/RES VST EST HIGH MDM 60: CPT | Mod: S$GLB,,, | Performed by: NURSE PRACTITIONER

## 2024-12-10 PROCEDURE — 1160F RVW MEDS BY RX/DR IN RCRD: CPT | Mod: CPTII,S$GLB,, | Performed by: NURSE PRACTITIONER

## 2024-12-10 PROCEDURE — 4010F ACE/ARB THERAPY RXD/TAKEN: CPT | Mod: CPTII,S$GLB,, | Performed by: NURSE PRACTITIONER

## 2024-12-10 PROCEDURE — 3052F HG A1C>EQUAL 8.0%<EQUAL 9.0%: CPT | Mod: CPTII,S$GLB,, | Performed by: NURSE PRACTITIONER

## 2024-12-10 PROCEDURE — 3062F POS MACROALBUMINURIA REV: CPT | Mod: CPTII,S$GLB,, | Performed by: NURSE PRACTITIONER

## 2024-12-10 RX ORDER — PREDNISONE 10 MG/1
10 TABLET ORAL DAILY
Qty: 7 TABLET | Refills: 0 | Status: ON HOLD | OUTPATIENT
Start: 2024-12-10 | End: 2024-12-17

## 2024-12-10 RX ORDER — IPRATROPIUM BROMIDE AND ALBUTEROL SULFATE 2.5; .5 MG/3ML; MG/3ML
3 SOLUTION RESPIRATORY (INHALATION) EVERY 6 HOURS PRN
Qty: 75 ML | Refills: 11 | Status: ON HOLD | OUTPATIENT
Start: 2024-12-10 | End: 2025-12-10

## 2024-12-10 NOTE — TELEPHONE ENCOUNTER
----- Message from Naheed Schmitt MD sent at 12/10/2024  3:42 PM CST -----  Regarding: this week  Hey Can we get Mr. Grant on my schedule for either Thursday at 3:30 or Friday afternoon? I am here for fellows clinic and can see him in between their patients.     Thank you!  Naheed

## 2024-12-10 NOTE — PROGRESS NOTES
[facility]  Transitional Care Management (TCM) Home Visit    Encounter Provider: Hollie Becker   PCP: Wilfredo De Souza MD  Consult Requested By: Dr. Wilfredo De Souza  Admit Date: 11/20/24   IP Discharge Date: 11/20/24  Hospital Length of Stay:  Days since discharge (from IP or SNF):   [facility] Contact Note:   Hospital Diagnosis: Interstitial lung disease [J84.9];Chronic hypoxemic respiratory failure [J96.11]     HISTORY OF PRESENT ILLNESS      Patient ID: Emmanuel Grant is a 74 y.o. male was recently admitted to the hospital, this is their TCM encounter.    Hospital Course Synopsis:  Patient admitted for acute on chronic respiratory failure. Evaluated by Pulmonology who recommended continued diuresis as tolerated, Solu-Medrol, increasing CellCept dose and continous titration of O2 to maintain saturations >90%. Patient euvolemic on exam and without signs of infection throughout admission. Antibiotics continued given patient's history of ILD and immunosuppressive therapy. Received 5 day course of IV Levaquin while inpatient, will not continue treatment on discharge given minimal suspicion for infection. Patient reported gradual improvement of SOB with treatment initiated therapy recommended by Pulmonology. He is now able to ambulate to bathroom and around hospital room without worsening of symptoms. Adequate saturations on home 2L. Walk test completed before discharge. Patient saturations noted at 94% on home 2L with exercise. Will discharge on home O2, increase not needed. Will discharge with Pulmonology recommendations of steroid taper, increased CellCept dose, budesonide and alformeterol nebulizers. Will also discharge on 3 days of Potassium, given repeated hypokalemia with albuterol treatments. BMP to be rechecked within 1 week. Patient is hemodynamically stable for discharge with PCP and Pulmonology follow-up appointments.     Pt is being seen in his home today. He reports not feeling well. He  appears SOB. He is sitting on his porch at arrival without his oxygen on. Reports he has not felt well the few days shayna since his prednisone taper finished. He is compliant with his medications, using his nebulizer twice daily with Brovana. He is not currently using the DUO-Neb did not understand those instructions. Reports uses his oxygen at 2L daily, took a break to sit outside in the breeze as that usually helps him feel better.  Pt appears ill, SOB and coughing. Recommended pt go to ER, he declined.        DECISION MAKING TODAY       Assessment & Plan:  1. Interstitial lung disease exacerbation  Assessment & Plan:  Sat 83 on RA improved to 92 with 2L  Cellcept and Brovana in place  Resume using albuterol neb as rescue  Wheeze noted  Prednisone to pharmacy  Follow up with pulm  Declined ER    Orders:  -     Ambulatory referral/consult to [facility]  -     albuterol-ipratropium (DUO-NEB) 2.5 mg-0.5 mg/3 mL nebulizer solution; Take 3 mLs by nebulization every 6 (six) hours as needed for Wheezing. Rescue  Dispense: 75 mL; Refill: 11    2. Chronic hypoxemic respiratory failure  Assessment & Plan:  See ILD    Orders:  -     Ambulatory referral/consult to [facility]  -     albuterol-ipratropium (DUO-NEB) 2.5 mg-0.5 mg/3 mL nebulizer solution; Take 3 mLs by nebulization every 6 (six) hours as needed for Wheezing. Rescue  Dispense: 75 mL; Refill: 11    3. Interstitial lung disease  Assessment & Plan:  Sat 83 on RA improved to 92 with 2L  Cellcept and Brovana in place  Resume using albuterol neb as rescue  Wheeze noted  Prednisone to pharmacy  Follow up with pulm  Declined ER    Orders:  -     Ambulatory referral/consult to [facility]  -     albuterol-ipratropium (DUO-NEB) 2.5 mg-0.5 mg/3 mL nebulizer solution; Take 3 mLs by nebulization every 6 (six) hours as needed for Wheezing. Rescue  Dispense: 75 mL; Refill: 11    4. Essential hypertension  Assessment &  Plan:  At goal  Continue current regimen  Followed by PCP      Other orders  -     predniSONE (DELTASONE) 10 MG tablet; Take 1 tablet (10 mg total) by mouth once daily. for 7 days  Dispense: 7 tablet; Refill: 0         Medication List on Discharge:     Medication List            Accurate as of December 10, 2024  3:09 PM. If you have any questions, ask your nurse or doctor.                CHANGE how you take these medications      predniSONE 10 MG tablet  Commonly known as: DELTASONE  Take 1 tablet (10 mg total) by mouth once daily. for 7 days  What changed:   medication strength  See the new instructions.  Changed by: Hollie Becker NP            CONTINUE taking these medications      albuterol-ipratropium 2.5 mg-0.5 mg/3 mL nebulizer solution  Commonly known as: DUO-NEB  Take 3 mLs by nebulization every 6 (six) hours as needed for Wheezing. Rescue     amLODIPine 10 MG tablet  Commonly known as: NORVASC  Take 1 tablet (10 mg total) by mouth once daily.     arformoteroL 15 mcg/2 mL Nebu  Commonly known as: BROVANA  Take 2 mLs (15 mcg total) by nebulization 2 (two) times daily. Controller     aspirin 81 MG Chew  Take 81 mg by mouth once daily.     atorvastatin 40 MG tablet  Commonly known as: LIPITOR  Take 1 tablet (40 mg total) by mouth every evening.     BEANO ORAL  Take 1 tablet by mouth once daily.     budesonide 0.5 mg/2 mL nebulizer solution  Commonly known as: PULMICORT  Take 4 mLs (1 mg total) by nebulization 2 (two) times daily. Controller     cholecalciferol (vitamin D3) 50 mcg (2,000 unit) Cap capsule  Commonly known as: VITAMIN D3  Take 1 capsule by mouth once daily.     ENTRESTO 24-26 mg per tablet  Generic drug: sacubitriL-valsartan  Take 1 tablet by mouth 2 (two) times daily.     famotidine 40 MG tablet  Commonly known as: PEPCID  Take 1 tablet (40 mg total) by mouth 2 (two) times daily.     fluticasone-salmeterol 250-50 mcg/dose 250-50 mcg/dose diskus inhaler  Commonly known as: ADVAIR  Inhale 1 puff  into the lungs 2 (two) times daily. Controller     FREESTYLE RAMBO 3 PLUS SENSOR Blanche  Generic drug: blood-glucose sensor  Change every 15 days     furosemide 20 MG tablet  Commonly known as: LASIX  Take 1 tablet (20 mg total) by mouth once daily.     glimepiride 2 MG tablet  Commonly known as: AMARYL  Take 1 tablet (2 mg total) by mouth 2 (two) times a day. Take with meals     isosorbide mononitrate 30 MG 24 hr tablet  Commonly known as: IMDUR  Take 1 tablet (30 mg total) by mouth once daily.     metFORMIN 1000 MG tablet  Commonly known as: GLUCOPHAGE  Take 1 tablet (1,000 mg total) by mouth 2 (two) times daily with meals.     metoprolol succinate 50 MG 24 hr tablet  Commonly known as: TOPROL-XL  Take 1 tablet (50 mg total) by mouth once daily.     mirabegron 25 mg Tb24 ER tablet  Commonly known as: MYRBETRIQ  Take 1 tablet (25 mg total) by mouth once daily.     mycophenolate 250 mg Cap  Commonly known as: CELLCEPT  Take 4 capsules (1,000 mg total) by mouth 2 (two) times daily.     nitroGLYCERIN 0.3 MG SL tablet  Commonly known as: NITROSTAT  Place 1 tablet (0.3 mg total) under the tongue every 5 (five) minutes as needed for Chest pain.     sucralfate 1 gram tablet  Commonly known as: CARAFATE  Take 1 tablet (1 g total) by mouth 4 (four) times daily before meals and nightly.     tamsulosin 0.4 mg Cap  Commonly known as: FLOMAX  Take 1 capsule (0.4 mg total) by mouth once daily.              Medication Reconciliation:  Were medications changed on discharge? Yes  Were medications in the home? Yes  Is the patient taking the medications as directed? Yes  Does the patient understand the medications and changes? Yes  Does updated med list accurately reflects meds patient is currently taking? Yes    ENVIRONMENT OF CARE      Family and/or Caregiver present at visit?  No  Name of Caregiver:   History provided by: patient    Advance Care Planning   Advanced Care Planning Status:  Patient has had an ACP conversation  Living  Will: No  Power of : No  [place]: No    Does Caregiver have HCPoA: No  Changes today: none  Is patient hospice appropriate: Yes  (If needed, use PPS <30 or FAST score >7)  Was referral to hospice placed: No       Impression upon entering the home:  Physical Dwelling: trailer   Appearance of home environment: cleaniness: clean  Functional Status: independent  Mobility: ambulatory  Nutritional access: adequate intake and access  Home Health: No, and does not need it at this time   DME/Supplies: oxygen     Diagnostic tests reviewed/disposition: No diagnosic tests pending after this hospitalization.  Disease/illness education:  ILD  Establishment or re-establishment of referral orders for community resources: No other necessary community resources.   Discussion with other health care providers: Yes, spoke with pulmonology to have appt sooner.   Does patient have a PCP at [facility]? Yes   Repatriation plan with PCP? follow-up with PCP within 30d   Does patient have an ostomy (ileostomy, colostomy, suprapubic catheter, nephrostomy tube, tracheostomy, PEG tube, pleurex catheter, cholecystostomy, etc)? No  Were BPAs reviewed? 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   Tobacco Use    Smoking status: Never     Passive exposure: Never    Smokeless tobacco: Never   Substance and Sexual Activity    Alcohol use: No    Drug use: Never    Sexual activity: Not Currently     Social Drivers of Health     Financial Resource Strain: Patient Declined (11/5/2024)    Overall Financial Resource Strain (CARDIA)     Difficulty of Paying Living Expenses: Patient declined   Recent Concern: Financial Resource Strain - High Risk (9/25/2024)    Overall Financial Resource Strain (CARDIA)     Difficulty of Paying Living Expenses: Very hard   Food Insecurity: Patient Declined (11/5/2024)    Hunger Vital Sign     Worried About  Running Out of Food in the Last Year: Patient declined     Ran Out of Food in the Last Year: Patient declined   Recent Concern: Food Insecurity - Food Insecurity Present (9/25/2024)    Hunger Vital Sign     Worried About Running Out of Food in the Last Year: Often true     Ran Out of Food in the Last Year: Often true   Transportation Needs: Patient Declined (11/5/2024)    TRANSPORTATION NEEDS     Transportation : Patient declined   Physical Activity: Insufficiently Active (10/12/2024)    Exercise Vital Sign     Days of Exercise per Week: 3 days     Minutes of Exercise per Session: 30 min   Stress: Patient Declined (11/5/2024)    [employer] - Occupational Stress Questionnaire     Feeling of Stress : Patient declined   Housing Stability: Patient Declined (11/5/2024)    Housing Stability Vital Sign     Unable to Pay for Housing in the Last Year: Patient declined     Homeless in the Last Year: Patient declined       OBJECTIVE:     Vital Signs:  Vitals:    12/10/24 1023   Pulse: 72   Resp: 20   Temp: 97.2 °F (36.2 °C)       Review of Systems   Constitutional:  Positive for fatigue. Negative for activity change and fever.   HENT: Negative.     Eyes: Negative.    Respiratory:  Positive for cough, shortness of breath and wheezing. Negative for chest tightness.    Cardiovascular: Negative.  Negative for leg swelling.   Gastrointestinal: Negative.    Endocrine: Negative.    Genitourinary: Negative.    Musculoskeletal: Negative.    Skin: Negative.    Allergic/Immunologic: Negative.    Neurological: Negative.    Hematological: Negative.    Psychiatric/Behavioral: Negative.  Negative for agitation.    All other systems reviewed and are negative.      Physical Exam:  Physical Exam  Vitals reviewed.   Constitutional:       General: He is not in acute distress.     Appearance: He is well-developed. He is ill-appearing.   HENT:      Head: Normocephalic and atraumatic.      Nose: Nose normal.       Mouth/Throat:      Mouth: Mucous membranes are dry.   Eyes:      Pupils: Pupils are equal, round, and reactive to light.   Cardiovascular:      Rate and Rhythm: Normal rate and regular rhythm.      Heart sounds: Normal heart sounds.   Pulmonary:      Effort: Pulmonary effort is normal.      Breath sounds: Wheezing (REBEKA) present.   Abdominal:      General: Bowel sounds are normal.      Palpations: Abdomen is soft.   Musculoskeletal:         General: Normal range of motion.      Cervical back: Normal range of motion and neck supple.   Skin:     General: Skin is warm and dry.   Neurological:      Mental Status: He is alert and oriented to person, place, and time.      Cranial Nerves: No cranial nerve deficit.   Psychiatric:         Behavior: Behavior normal.         Thought Content: Thought content normal.         Judgment: Judgment normal.         INSTRUCTIONS FOR PATIENT:     Scheduled Follow-up, Appts Reviewed with Modifications if Needed: Yes  Future Appointments   Date Time Provider Department Center   12/11/2024  9:00 AM Brandon Ventura MD [facility] CARDIO [facility]   12/16/2024  2:40 PM Narda Knott MD [facility] URO [facility]   1/3/2025  8:00 AM Samira Palma MD [facility] OPHTHAL [facility]   2/27/2025  2:00 PM Naheed Schmitt MD [facility] PULMSVC [facility]   3/17/2025  1:00 PM Wilfredo De Souza MD [facility]         Signature: Hollie Becker NP    Transition of Care Visit:  I have reviewed and updated the history and problem list.  I have reconciled the medication list.  I have discussed the hospitalization and current medical issues, prognosis and plans with the patient/family.

## 2024-12-10 NOTE — TELEPHONE ENCOUNTER
Left message on pt voicemail, informing him that I'm contacting him in regards to offering appointment with Dr Schmitt. I also advised pt that if he wants to schedule appointment or if he has any questions or concerns, Pt can contact the office.

## 2024-12-10 NOTE — ASSESSMENT & PLAN NOTE
Sat 83 on RA improved to 92 with 2L  Cellcept and Brovana in place  Resume using albuterol neb as rescue  Wheeze noted  Prednisone to pharmacy  Follow up with pulm  Declined ER

## 2024-12-11 ENCOUNTER — OUTPATIENT CASE MANAGEMENT (OUTPATIENT)
Dept: ADMINISTRATIVE | Facility: OTHER | Age: 74
End: 2024-12-11
Payer: MEDICARE

## 2024-12-11 ENCOUNTER — TELEPHONE (OUTPATIENT)
Dept: PULMONOLOGY | Facility: CLINIC | Age: 74
End: 2024-12-11
Payer: MEDICARE

## 2024-12-11 NOTE — TELEPHONE ENCOUNTER
Attempted to contact patient again in regards to scheduling a follow up visit. No answer. Patient voicemail is not set up yet.

## 2024-12-11 NOTE — TELEPHONE ENCOUNTER
Attempted to contact patient in regards to scheduling an appointment on 12/12/24 at 3:30 pm per Dr Schmitt. No answer. Patient voicemail is not setup yet.    Medical Necessity Information: It is in your best interest to select a reason for this procedure from the list below. All of these items fulfill various CMS LCD requirements except lesion extends to a margin. Include Z78.9 (Other Specified Conditions Influencing Health Status) As An Associated Diagnosis?: No Medical Necessity Clause: This procedure was medically necessary because the lesion that was treated was: Size Of Lesion In Cm: 1.8 X Size Of Lesion In Cm (Optional): 0 Size Of Margin In Cm: 0.2 Excision Method: Slit Repair Type: None Suturegard Retention Suture: 2-0 Nylon Retention Suture Bite Size: 3 mm Length To Time In Minutes Device Was In Place: 10 Number Of Hemigard Strips Per Side: 1 Undermining Type: Entire Wound Debridement Text: The wound edges were debrided prior to proceeding with the closure to facilitate wound healing. Helical Rim Text: The closure involved the helical rim. Vermilion Border Text: The closure involved the vermilion border. Nostril Rim Text: The closure involved the nostril rim. Retention Suture Text: Retention sutures were placed to support the closure and prevent dehiscence. Suture Removal: 14 days Epidermal Closure Graft Donor Site (Optional): simple interrupted Graft Donor Site Bandage (Optional-Leave Blank If You Don't Want In Note): Steri-strips and a pressure bandage were applied to the donor site. Detail Level: Detailed Excision Depth: adipose tissue Scalpel Size: 15 blade Anesthesia Type: 1% lidocaine with epinephrine Additional Anesthesia Volume In Cc: 6 Hemostasis: Electrocautery Estimated Blood Loss (Cc): minimal Deep Sutures: 5-0 Vicryl Epidermal Sutures: 4-0 Ethilon Wound Care: Petrolatum Dressing: dry sterile dressing Suturegard Intro: Intraoperative tissue expansion was performed, utilizing the SUTUREGARD device, in order to reduce wound tension. Suturegard Body: The suture ends were repeatedly re-tightened and re-clamped to achieve the desired tissue expansion. Hemigard Intro: Due to skin fragility and wound tension, it was decided to use HEMIGARD adhesive retention suture devices to permit a linear closure. The skin was cleaned and dried for a 6cm distance away from the wound. Excessive hair, if present, was removed to allow for adhesion. Hemigard Postcare Instructions: The HEMIGARD strips are to remain completely dry for at least 5-7 days. Complex Repair Preamble Text (Leave Blank If You Do Not Want): Extensive wide undermining was performed. Intermediate Repair Preamble Text (Leave Blank If You Do Not Want): Undermining was performed with blunt dissection. Fusiform Excision Additional Text (Leave Blank If You Do Not Want): The margin was drawn around the clinically apparent lesion.  A fusiform shape was then drawn on the skin incorporating the lesion and margins.  Incisions were then made along these lines to the appropriate tissue plane and the lesion was extirpated. Eliptical Excision Additional Text (Leave Blank If You Do Not Want): The margin was drawn around the clinically apparent lesion.  An elliptical shape was then drawn on the skin incorporating the lesion and margins.  Incisions were then made along these lines to the appropriate tissue plane and the lesion was extirpated. Saucerization Excision Additional Text (Leave Blank If You Do Not Want): The margin was drawn around the clinically apparent lesion.  Incisions were then made along these lines, in a tangential fashion, to the appropriate tissue plane and the lesion was extirpated. Slit Excision Additional Text (Leave Blank If You Do Not Want): A linear line was drawn on the skin overlying the lesion. An incision was made slowly until the lesion was visualized.  Once visualized, the lesion was removed with blunt dissection. Excisional Biopsy Additional Text (Leave Blank If You Do Not Want): The margin was drawn around the clinically apparent lesion. An elliptical shape was then drawn on the skin incorporating the lesion and margins.  Incisions were then made along these lines to the appropriate tissue plane and the lesion was extirpated. Perilesional Excision Additional Text (Leave Blank If You Do Not Want): The margin was drawn around the clinically apparent lesion. Incisions were then made along these lines to the appropriate tissue plane and the lesion was extirpated. Repair Performed By Another Provider Text (Leave Blank If You Do Not Want): After the tissue was excised the defect was repaired by another provider. No Repair - Repaired With Adjacent Surgical Defect Text (Leave Blank If You Do Not Want): After the excision the defect was repaired concurrently with another surgical defect which was in close approximation. Advancement Flap (Single) Text: The defect edges were debeveled with a #15 scalpel blade.  Given the location of the defect and the proximity to free margins a single advancement flap was deemed most appropriate.  Using a sterile surgical marker, an appropriate advancement flap was drawn incorporating the defect and placing the expected incisions within the relaxed skin tension lines where possible.    The area thus outlined was incised deep to adipose tissue with a #15 scalpel blade.  The skin margins were undermined to an appropriate distance in all directions utilizing iris scissors. Advancement Flap (Double) Text: The defect edges were debeveled with a #15 scalpel blade.  Given the location of the defect and the proximity to free margins a double advancement flap was deemed most appropriate.  Using a sterile surgical marker, the appropriate advancement flaps were drawn incorporating the defect and placing the expected incisions within the relaxed skin tension lines where possible.    The area thus outlined was incised deep to adipose tissue with a #15 scalpel blade.  The skin margins were undermined to an appropriate distance in all directions utilizing iris scissors. Burow's Advancement Flap Text: The defect edges were debeveled with a #15 scalpel blade.  Given the location of the defect and the proximity to free margins a Burow's advancement flap was deemed most appropriate.  Using a sterile surgical marker, the appropriate advancement flap was drawn incorporating the defect and placing the expected incisions within the relaxed skin tension lines where possible.    The area thus outlined was incised deep to adipose tissue with a #15 scalpel blade.  The skin margins were undermined to an appropriate distance in all directions utilizing iris scissors. Chonodrocutaneous Helical Advancement Flap Text: The defect edges were debeveled with a #15 scalpel blade.  Given the location of the defect and the proximity to free margins a chondrocutaneous helical advancement flap was deemed most appropriate.  Using a sterile surgical marker, the appropriate advancement flap was drawn incorporating the defect and placing the expected incisions within the relaxed skin tension lines where possible.    The area thus outlined was incised deep to adipose tissue with a #15 scalpel blade.  The skin margins were undermined to an appropriate distance in all directions utilizing iris scissors. Crescentic Advancement Flap Text: The defect edges were debeveled with a #15 scalpel blade.  Given the location of the defect and the proximity to free margins a crescentic advancement flap was deemed most appropriate.  Using a sterile surgical marker, the appropriate advancement flap was drawn incorporating the defect and placing the expected incisions within the relaxed skin tension lines where possible.    The area thus outlined was incised deep to adipose tissue with a #15 scalpel blade.  The skin margins were undermined to an appropriate distance in all directions utilizing iris scissors. A-T Advancement Flap Text: The defect edges were debeveled with a #15 scalpel blade.  Given the location of the defect, shape of the defect and the proximity to free margins an A-T advancement flap was deemed most appropriate.  Using a sterile surgical marker, an appropriate advancement flap was drawn incorporating the defect and placing the expected incisions within the relaxed skin tension lines where possible.    The area thus outlined was incised deep to adipose tissue with a #15 scalpel blade.  The skin margins were undermined to an appropriate distance in all directions utilizing iris scissors. O-T Advancement Flap Text: The defect edges were debeveled with a #15 scalpel blade.  Given the location of the defect, shape of the defect and the proximity to free margins an O-T advancement flap was deemed most appropriate.  Using a sterile surgical marker, an appropriate advancement flap was drawn incorporating the defect and placing the expected incisions within the relaxed skin tension lines where possible.    The area thus outlined was incised deep to adipose tissue with a #15 scalpel blade.  The skin margins were undermined to an appropriate distance in all directions utilizing iris scissors. O-L Flap Text: The defect edges were debeveled with a #15 scalpel blade.  Given the location of the defect, shape of the defect and the proximity to free margins an O-L flap was deemed most appropriate.  Using a sterile surgical marker, an appropriate advancement flap was drawn incorporating the defect and placing the expected incisions within the relaxed skin tension lines where possible.    The area thus outlined was incised deep to adipose tissue with a #15 scalpel blade.  The skin margins were undermined to an appropriate distance in all directions utilizing iris scissors. O-Z Flap Text: The defect edges were debeveled with a #15 scalpel blade.  Given the location of the defect, shape of the defect and the proximity to free margins an O-Z flap was deemed most appropriate.  Using a sterile surgical marker, an appropriate transposition flap was drawn incorporating the defect and placing the expected incisions within the relaxed skin tension lines where possible. The area thus outlined was incised deep to adipose tissue with a #15 scalpel blade.  The skin margins were undermined to an appropriate distance in all directions utilizing iris scissors. Double O-Z Flap Text: The defect edges were debeveled with a #15 scalpel blade.  Given the location of the defect, shape of the defect and the proximity to free margins a Double O-Z flap was deemed most appropriate.  Using a sterile surgical marker, an appropriate transposition flap was drawn incorporating the defect and placing the expected incisions within the relaxed skin tension lines where possible. The area thus outlined was incised deep to adipose tissue with a #15 scalpel blade.  The skin margins were undermined to an appropriate distance in all directions utilizing iris scissors. V-Y Flap Text: The defect edges were debeveled with a #15 scalpel blade.  Given the location of the defect, shape of the defect and the proximity to free margins a V-Y flap was deemed most appropriate.  Using a sterile surgical marker, an appropriate advancement flap was drawn incorporating the defect and placing the expected incisions within the relaxed skin tension lines where possible.    The area thus outlined was incised deep to adipose tissue with a #15 scalpel blade.  The skin margins were undermined to an appropriate distance in all directions utilizing iris scissors. Mercedes Flap Text: The defect edges were debeveled with a #15 scalpel blade.  Given the location of the defect, shape of the defect and the proximity to free margins a Mercedes flap was deemed most appropriate.  Using a sterile surgical marker, an appropriate advancement flap was drawn incorporating the defect and placing the expected incisions within the relaxed skin tension lines where possible. The area thus outlined was incised deep to adipose tissue with a #15 scalpel blade.  The skin margins were undermined to an appropriate distance in all directions utilizing iris scissors. Modified Advancement Flap Text: The defect edges were debeveled with a #15 scalpel blade.  Given the location of the defect, shape of the defect and the proximity to free margins a modified advancement flap was deemed most appropriate.  Using a sterile surgical marker, an appropriate advancement flap was drawn incorporating the defect and placing the expected incisions within the relaxed skin tension lines where possible.    The area thus outlined was incised deep to adipose tissue with a #15 scalpel blade.  The skin margins were undermined to an appropriate distance in all directions utilizing iris scissors. Mucosal Advancement Flap Text: Given the location of the defect, shape of the defect and the proximity to free margins a mucosal advancement flap was deemed most appropriate. Incisions were made with a 15 blade scalpel in the appropriate fashion along the cutaneous vermillion border and the mucosal lip. The remaining actinically damaged mucosal tissue was excised.  The mucosal advancement flap was then elevated to the gingival sulcus with care taken to preserve the neurovascular structures and advanced into the primary defect. Care was taken to ensure that precise realignment of the vermilion border was achieved. Hatchet Flap Text: The defect edges were debeveled with a #15 scalpel blade.  Given the location of the defect, shape of the defect and the proximity to free margins a hatchet flap was deemed most appropriate.  Using a sterile surgical marker, an appropriate hatchet flap was drawn incorporating the defect and placing the expected incisions within the relaxed skin tension lines where possible.    The area thus outlined was incised deep to adipose tissue with a #15 scalpel blade.  The skin margins were undermined to an appropriate distance in all directions utilizing iris scissors. Rotation Flap Text: The defect edges were debeveled with a #15 scalpel blade.  Given the location of the defect, shape of the defect and the proximity to free margins a rotation flap was deemed most appropriate.  Using a sterile surgical marker, an appropriate rotation flap was drawn incorporating the defect and placing the expected incisions within the relaxed skin tension lines where possible.    The area thus outlined was incised deep to adipose tissue with a #15 scalpel blade.  The skin margins were undermined to an appropriate distance in all directions utilizing iris scissors. Spiral Flap Text: The defect edges were debeveled with a #15 scalpel blade.  Given the location of the defect, shape of the defect and the proximity to free margins a spiral flap was deemed most appropriate.  Using a sterile surgical marker, an appropriate rotation flap was drawn incorporating the defect and placing the expected incisions within the relaxed skin tension lines where possible. The area thus outlined was incised deep to adipose tissue with a #15 scalpel blade.  The skin margins were undermined to an appropriate distance in all directions utilizing iris scissors. Star Wedge Flap Text: The defect edges were debeveled with a #15 scalpel blade.  Given the location of the defect, shape of the defect and the proximity to free margins a star wedge flap was deemed most appropriate.  Using a sterile surgical marker, an appropriate rotation flap was drawn incorporating the defect and placing the expected incisions within the relaxed skin tension lines where possible. The area thus outlined was incised deep to adipose tissue with a #15 scalpel blade.  The skin margins were undermined to an appropriate distance in all directions utilizing iris scissors. Transposition Flap Text: The defect edges were debeveled with a #15 scalpel blade.  Given the location of the defect and the proximity to free margins a transposition flap was deemed most appropriate.  Using a sterile surgical marker, an appropriate transposition flap was drawn incorporating the defect.    The area thus outlined was incised deep to adipose tissue with a #15 scalpel blade.  The skin margins were undermined to an appropriate distance in all directions utilizing iris scissors. Muscle Hinge Flap Text: The defect edges were debeveled with a #15 scalpel blade.  Given the size, depth and location of the defect and the proximity to free margins a muscle hinge flap was deemed most appropriate.  Using a sterile surgical marker, an appropriate hinge flap was drawn incorporating the defect. The area thus outlined was incised with a #15 scalpel blade.  The skin margins were undermined to an appropriate distance in all directions utilizing iris scissors. Nasal Turnover Hinge Flap Text: The defect edges were debeveled with a #15 scalpel blade.  Given the size, depth, location of the defect and the defect being full thickness a nasal turnover hinge flap was deemed most appropriate.  Using a sterile surgical marker, an appropriate hinge flap was drawn incorporating the defect. The area thus outlined was incised with a #15 scalpel blade. The flap was designed to recreate the nasal mucosal lining and the alar rim. The skin margins were undermined to an appropriate distance in all directions utilizing iris scissors. Nasalis-Muscle-Based Myocutaneous Island Pedicle Flap Text: Using a #15 blade, an incision was made around the donor flap to the level of the nasalis muscle. Wide lateral undermining was then performed in both the subcutaneous plane above the nasalis muscle, and in a submuscular plane just above periosteum. This allowed the formation of a free nasalis muscle axial pedicle (based on the angular artery) which was still attached to the actual cutaneous flap, increasing its mobility and vascular viability. Hemostasis was obtained with pinpoint electrocoagulation. The flap was mobilized into position and the pivotal anchor points positioned and stabilized with buried interrupted sutures. Subcutaneous and dermal tissues were closed in a multilayered fashion with sutures. Tissue redundancies were excised, and the epidermal edges were apposed without significant tension and sutured with sutures. Orbicularis Oris Muscle Flap Text: The defect edges were debeveled with a #15 scalpel blade.  Given that the defect affected the competency of the oral sphincter an orbicularis oris muscle flap was deemed most appropriate to restore this competency and normal muscle function.  Using a sterile surgical marker, an appropriate flap was drawn incorporating the defect. The area thus outlined was incised with a #15 scalpel blade. Melolabial Transposition Flap Text: The defect edges were debeveled with a #15 scalpel blade.  Given the location of the defect and the proximity to free margins a melolabial flap was deemed most appropriate.  Using a sterile surgical marker, an appropriate melolabial transposition flap was drawn incorporating the defect.    The area thus outlined was incised deep to adipose tissue with a #15 scalpel blade.  The skin margins were undermined to an appropriate distance in all directions utilizing iris scissors. Rhombic Flap Text: The defect edges were debeveled with a #15 scalpel blade.  Given the location of the defect and the proximity to free margins a rhombic flap was deemed most appropriate.  Using a sterile surgical marker, an appropriate rhombic flap was drawn incorporating the defect.    The area thus outlined was incised deep to adipose tissue with a #15 scalpel blade.  The skin margins were undermined to an appropriate distance in all directions utilizing iris scissors. Rhomboid Transposition Flap Text: The defect edges were debeveled with a #15 scalpel blade.  Given the location of the defect and the proximity to free margins a rhomboid transposition flap was deemed most appropriate.  Using a sterile surgical marker, an appropriate rhomboid flap was drawn incorporating the defect.    The area thus outlined was incised deep to adipose tissue with a #15 scalpel blade.  The skin margins were undermined to an appropriate distance in all directions utilizing iris scissors. Bi-Rhombic Flap Text: The defect edges were debeveled with a #15 scalpel blade.  Given the location of the defect and the proximity to free margins a bi-rhombic flap was deemed most appropriate.  Using a sterile surgical marker, an appropriate rhombic flap was drawn incorporating the defect. The area thus outlined was incised deep to adipose tissue with a #15 scalpel blade.  The skin margins were undermined to an appropriate distance in all directions utilizing iris scissors. Helical Rim Advancement Flap Text: The defect edges were debeveled with a #15 blade scalpel.  Given the location of the defect and the proximity to free margins (helical rim) a double helical rim advancement flap was deemed most appropriate.  Using a sterile surgical marker, the appropriate advancement flaps were drawn incorporating the defect and placing the expected incisions between the helical rim and antihelix where possible.  The area thus outlined was incised through and through with a #15 scalpel blade.  With a skin hook and iris scissors, the flaps were gently and sharply undermined and freed up. Bilateral Helical Rim Advancement Flap Text: The defect edges were debeveled with a #15 blade scalpel.  Given the location of the defect and the proximity to free margins (helical rim) a bilateral helical rim advancement flap was deemed most appropriate.  Using a sterile surgical marker, the appropriate advancement flaps were drawn incorporating the defect and placing the expected incisions between the helical rim and antihelix where possible.  The area thus outlined was incised through and through with a #15 scalpel blade.  With a skin hook and iris scissors, the flaps were gently and sharply undermined and freed up. Ear Star Wedge Flap Text: The defect edges were debeveled with a #15 blade scalpel.  Given the location of the defect and the proximity to free margins (helical rim) an ear star wedge flap was deemed most appropriate.  Using a sterile surgical marker, the appropriate flap was drawn incorporating the defect and placing the expected incisions between the helical rim and antihelix where possible.  The area thus outlined was incised through and through with a #15 scalpel blade. Banner Transposition Flap Text: The defect edges were debeveled with a #15 scalpel blade.  Given the location of the defect and the proximity to free margins a Banner transposition flap was deemed most appropriate.  Using a sterile surgical marker, an appropriate flap drawn around the defect. The area thus outlined was incised deep to adipose tissue with a #15 scalpel blade.  The skin margins were undermined to an appropriate distance in all directions utilizing iris scissors. Bilobed Flap Text: The defect edges were debeveled with a #15 scalpel blade.  Given the location of the defect and the proximity to free margins a bilobe flap was deemed most appropriate.  Using a sterile surgical marker, an appropriate bilobe flap drawn around the defect.    The area thus outlined was incised deep to adipose tissue with a #15 scalpel blade.  The skin margins were undermined to an appropriate distance in all directions utilizing iris scissors. Bilobed Transposition Flap Text: The defect edges were debeveled with a #15 scalpel blade.  Given the location of the defect and the proximity to free margins a bilobed transposition flap was deemed most appropriate.  Using a sterile surgical marker, an appropriate bilobe flap drawn around the defect.    The area thus outlined was incised deep to adipose tissue with a #15 scalpel blade.  The skin margins were undermined to an appropriate distance in all directions utilizing iris scissors. Trilobed Flap Text: The defect edges were debeveled with a #15 scalpel blade.  Given the location of the defect and the proximity to free margins a trilobed flap was deemed most appropriate.  Using a sterile surgical marker, an appropriate trilobed flap drawn around the defect.    The area thus outlined was incised deep to adipose tissue with a #15 scalpel blade.  The skin margins were undermined to an appropriate distance in all directions utilizing iris scissors. Dorsal Nasal Flap Text: The defect edges were debeveled with a #15 scalpel blade.  Given the location of the defect and the proximity to free margins a dorsal nasal flap was deemed most appropriate.  Using a sterile surgical marker, an appropriate dorsal nasal flap was drawn around the defect.    The area thus outlined was incised deep to adipose tissue with a #15 scalpel blade.  The skin margins were undermined to an appropriate distance in all directions utilizing iris scissors. Island Pedicle Flap Text: The defect edges were debeveled with a #15 scalpel blade.  Given the location of the defect, shape of the defect and the proximity to free margins an island pedicle advancement flap was deemed most appropriate.  Using a sterile surgical marker, an appropriate advancement flap was drawn incorporating the defect, outlining the appropriate donor tissue and placing the expected incisions within the relaxed skin tension lines where possible.    The area thus outlined was incised deep to adipose tissue with a #15 scalpel blade.  The skin margins were undermined to an appropriate distance in all directions around the primary defect and laterally outward around the island pedicle utilizing iris scissors.  There was minimal undermining beneath the pedicle flap. Island Pedicle Flap With Canthal Suspension Text: The defect edges were debeveled with a #15 scalpel blade.  Given the location of the defect, shape of the defect and the proximity to free margins an island pedicle advancement flap was deemed most appropriate.  Using a sterile surgical marker, an appropriate advancement flap was drawn incorporating the defect, outlining the appropriate donor tissue and placing the expected incisions within the relaxed skin tension lines where possible. The area thus outlined was incised deep to adipose tissue with a #15 scalpel blade.  The skin margins were undermined to an appropriate distance in all directions around the primary defect and laterally outward around the island pedicle utilizing iris scissors.  There was minimal undermining beneath the pedicle flap. A suspension suture was placed in the canthal tendon to prevent tension and prevent ectropion. Alar Island Pedicle Flap Text: The defect edges were debeveled with a #15 scalpel blade.  Given the location of the defect, shape of the defect and the proximity to the alar rim an island pedicle advancement flap was deemed most appropriate.  Using a sterile surgical marker, an appropriate advancement flap was drawn incorporating the defect, outlining the appropriate donor tissue and placing the expected incisions within the nasal ala running parallel to the alar rim. The area thus outlined was incised with a #15 scalpel blade.  The skin margins were undermined minimally to an appropriate distance in all directions around the primary defect and laterally outward around the island pedicle utilizing iris scissors.  There was minimal undermining beneath the pedicle flap. Double Island Pedicle Flap Text: The defect edges were debeveled with a #15 scalpel blade.  Given the location of the defect, shape of the defect and the proximity to free margins a double island pedicle advancement flap was deemed most appropriate.  Using a sterile surgical marker, an appropriate advancement flap was drawn incorporating the defect, outlining the appropriate donor tissue and placing the expected incisions within the relaxed skin tension lines where possible.    The area thus outlined was incised deep to adipose tissue with a #15 scalpel blade.  The skin margins were undermined to an appropriate distance in all directions around the primary defect and laterally outward around the island pedicle utilizing iris scissors.  There was minimal undermining beneath the pedicle flap. Island Pedicle Flap-Requiring Vessel Identification Text: The defect edges were debeveled with a #15 scalpel blade.  Given the location of the defect, shape of the defect and the proximity to free margins an island pedicle advancement flap was deemed most appropriate.  Using a sterile surgical marker, an appropriate advancement flap was drawn, based on the axial vessel mentioned above, incorporating the defect, outlining the appropriate donor tissue and placing the expected incisions within the relaxed skin tension lines where possible.    The area thus outlined was incised deep to adipose tissue with a #15 scalpel blade.  The skin margins were undermined to an appropriate distance in all directions around the primary defect and laterally outward around the island pedicle utilizing iris scissors.  There was minimal undermining beneath the pedicle flap. Keystone Flap Text: The defect edges were debeveled with a #15 scalpel blade.  Given the location of the defect, shape of the defect a keystone flap was deemed most appropriate.  Using a sterile surgical marker, an appropriate keystone flap was drawn incorporating the defect, outlining the appropriate donor tissue and placing the expected incisions within the relaxed skin tension lines where possible. The area thus outlined was incised deep to adipose tissue with a #15 scalpel blade.  The skin margins were undermined to an appropriate distance in all directions around the primary defect and laterally outward around the flap utilizing iris scissors. O-T Plasty Text: The defect edges were debeveled with a #15 scalpel blade.  Given the location of the defect, shape of the defect and the proximity to free margins an O-T plasty was deemed most appropriate.  Using a sterile surgical marker, an appropriate O-T plasty was drawn incorporating the defect and placing the expected incisions within the relaxed skin tension lines where possible.    The area thus outlined was incised deep to adipose tissue with a #15 scalpel blade.  The skin margins were undermined to an appropriate distance in all directions utilizing iris scissors. O-Z Plasty Text: The defect edges were debeveled with a #15 scalpel blade.  Given the location of the defect, shape of the defect and the proximity to free margins an O-Z plasty (double transposition flap) was deemed most appropriate.  Using a sterile surgical marker, the appropriate transposition flaps were drawn incorporating the defect and placing the expected incisions within the relaxed skin tension lines where possible.    The area thus outlined was incised deep to adipose tissue with a #15 scalpel blade.  The skin margins were undermined to an appropriate distance in all directions utilizing iris scissors.  Hemostasis was achieved with electrocautery.  The flaps were then transposed into place, one clockwise and the other counterclockwise, and anchored with interrupted buried subcutaneous sutures. Double O-Z Plasty Text: The defect edges were debeveled with a #15 scalpel blade.  Given the location of the defect, shape of the defect and the proximity to free margins a Double O-Z plasty (double transposition flap) was deemed most appropriate.  Using a sterile surgical marker, the appropriate transposition flaps were drawn incorporating the defect and placing the expected incisions within the relaxed skin tension lines where possible. The area thus outlined was incised deep to adipose tissue with a #15 scalpel blade.  The skin margins were undermined to an appropriate distance in all directions utilizing iris scissors.  Hemostasis was achieved with electrocautery.  The flaps were then transposed into place, one clockwise and the other counterclockwise, and anchored with interrupted buried subcutaneous sutures. V-Y Plasty Text: The defect edges were debeveled with a #15 scalpel blade.  Given the location of the defect, shape of the defect and the proximity to free margins an V-Y advancement flap was deemed most appropriate.  Using a sterile surgical marker, an appropriate advancement flap was drawn incorporating the defect and placing the expected incisions within the relaxed skin tension lines where possible.    The area thus outlined was incised deep to adipose tissue with a #15 scalpel blade.  The skin margins were undermined to an appropriate distance in all directions utilizing iris scissors. H Plasty Text: Given the location of the defect, shape of the defect and the proximity to free margins a H-plasty was deemed most appropriate for repair.  Using a sterile surgical marker, the appropriate advancement arms of the H-plasty were drawn incorporating the defect and placing the expected incisions within the relaxed skin tension lines where possible. The area thus outlined was incised deep to adipose tissue with a #15 scalpel blade. The skin margins were undermined to an appropriate distance in all directions utilizing iris scissors.  The opposing advancement arms were then advanced into place in opposite direction and anchored with interrupted buried subcutaneous sutures. W Plasty Text: The lesion was extirpated to the level of the fat with a #15 scalpel blade.  Given the location of the defect, shape of the defect and the proximity to free margins a W-plasty was deemed most appropriate for repair.  Using a sterile surgical marker, the appropriate transposition arms of the W-plasty were drawn incorporating the defect and placing the expected incisions within the relaxed skin tension lines where possible.    The area thus outlined was incised deep to adipose tissue with a #15 scalpel blade.  The skin margins were undermined to an appropriate distance in all directions utilizing iris scissors.  The opposing transposition arms were then transposed into place in opposite direction and anchored with interrupted buried subcutaneous sutures. Z Plasty Text: The lesion was extirpated to the level of the fat with a #15 scalpel blade.  Given the location of the defect, shape of the defect and the proximity to free margins a Z-plasty was deemed most appropriate for repair.  Using a sterile surgical marker, the appropriate transposition arms of the Z-plasty were drawn incorporating the defect and placing the expected incisions within the relaxed skin tension lines where possible.    The area thus outlined was incised deep to adipose tissue with a #15 scalpel blade.  The skin margins were undermined to an appropriate distance in all directions utilizing iris scissors.  The opposing transposition arms were then transposed into place in opposite direction and anchored with interrupted buried subcutaneous sutures. Zygomaticofacial Flap Text: Given the location of the defect, shape of the defect and the proximity to free margins a zygomaticofacial flap was deemed most appropriate for repair.  Using a sterile surgical marker, the appropriate flap was drawn incorporating the defect and placing the expected incisions within the relaxed skin tension lines where possible. The area thus outlined was incised deep to adipose tissue with a #15 scalpel blade with preservation of a vascular pedicle.  The skin margins were undermined to an appropriate distance in all directions utilizing iris scissors.  The flap was then placed into the defect and anchored with interrupted buried subcutaneous sutures. Cheek Interpolation Flap Text: A decision was made to reconstruct the defect utilizing an interpolation axial flap and a staged reconstruction.  A telfa template was made of the defect.  This telfa template was then used to outline the Cheek Interpolation flap.  The donor area for the pedicle flap was then injected with anesthesia.  The flap was excised through the skin and subcutaneous tissue down to the layer of the underlying musculature.  The interpolation flap was carefully excised within this deep plane to maintain its blood supply.  The edges of the donor site were undermined.   The donor site was closed in a primary fashion.  The pedicle was then rotated into position and sutured.  Once the tube was sutured into place, adequate blood supply was confirmed with blanching and refill.  The pedicle was then wrapped with xeroform gauze and dressed appropriately with a telfa and gauze bandage to ensure continued blood supply and protect the attached pedicle. Cheek-To-Nose Interpolation Flap Text: A decision was made to reconstruct the defect utilizing an interpolation axial flap and a staged reconstruction.  A telfa template was made of the defect.  This telfa template was then used to outline the Cheek-To-Nose Interpolation flap.  The donor area for the pedicle flap was then injected with anesthesia.  The flap was excised through the skin and subcutaneous tissue down to the layer of the underlying musculature.  The interpolation flap was carefully excised within this deep plane to maintain its blood supply.  The edges of the donor site were undermined.   The donor site was closed in a primary fashion.  The pedicle was then rotated into position and sutured.  Once the tube was sutured into place, adequate blood supply was confirmed with blanching and refill.  The pedicle was then wrapped with xeroform gauze and dressed appropriately with a telfa and gauze bandage to ensure continued blood supply and protect the attached pedicle. Interpolation Flap Text: A decision was made to reconstruct the defect utilizing an interpolation axial flap and a staged reconstruction.  A telfa template was made of the defect.  This telfa template was then used to outline the interpolation flap.  The donor area for the pedicle flap was then injected with anesthesia.  The flap was excised through the skin and subcutaneous tissue down to the layer of the underlying musculature.  The interpolation flap was carefully excised within this deep plane to maintain its blood supply.  The edges of the donor site were undermined.   The donor site was closed in a primary fashion.  The pedicle was then rotated into position and sutured.  Once the tube was sutured into place, adequate blood supply was confirmed with blanching and refill.  The pedicle was then wrapped with xeroform gauze and dressed appropriately with a telfa and gauze bandage to ensure continued blood supply and protect the attached pedicle. Melolabial Interpolation Flap Text: A decision was made to reconstruct the defect utilizing an interpolation axial flap and a staged reconstruction.  A telfa template was made of the defect.  This telfa template was then used to outline the melolabial interpolation flap.  The donor area for the pedicle flap was then injected with anesthesia.  The flap was excised through the skin and subcutaneous tissue down to the layer of the underlying musculature.  The pedicle flap was carefully excised within this deep plane to maintain its blood supply.  The edges of the donor site were undermined.   The donor site was closed in a primary fashion.  The pedicle was then rotated into position and sutured.  Once the tube was sutured into place, adequate blood supply was confirmed with blanching and refill.  The pedicle was then wrapped with xeroform gauze and dressed appropriately with a telfa and gauze bandage to ensure continued blood supply and protect the attached pedicle. Mastoid Interpolation Flap Text: A decision was made to reconstruct the defect utilizing an interpolation axial flap and a staged reconstruction.  A telfa template was made of the defect.  This telfa template was then used to outline the mastoid interpolation flap.  The donor area for the pedicle flap was then injected with anesthesia.  The flap was excised through the skin and subcutaneous tissue down to the layer of the underlying musculature.  The pedicle flap was carefully excised within this deep plane to maintain its blood supply.  The edges of the donor site were undermined.   The donor site was closed in a primary fashion.  The pedicle was then rotated into position and sutured.  Once the tube was sutured into place, adequate blood supply was confirmed with blanching and refill.  The pedicle was then wrapped with xeroform gauze and dressed appropriately with a telfa and gauze bandage to ensure continued blood supply and protect the attached pedicle. Posterior Auricular Interpolation Flap Text: A decision was made to reconstruct the defect utilizing an interpolation axial flap and a staged reconstruction.  A telfa template was made of the defect.  This telfa template was then used to outline the posterior auricular interpolation flap.  The donor area for the pedicle flap was then injected with anesthesia.  The flap was excised through the skin and subcutaneous tissue down to the layer of the underlying musculature.  The pedicle flap was carefully excised within this deep plane to maintain its blood supply.  The edges of the donor site were undermined.   The donor site was closed in a primary fashion.  The pedicle was then rotated into position and sutured.  Once the tube was sutured into place, adequate blood supply was confirmed with blanching and refill.  The pedicle was then wrapped with xeroform gauze and dressed appropriately with a telfa and gauze bandage to ensure continued blood supply and protect the attached pedicle. Paramedian Forehead Flap Text: A decision was made to reconstruct the defect utilizing an interpolation axial flap and a staged reconstruction.  A telfa template was made of the defect.  This telfa template was then used to outline the paramedian forehead pedicle flap.  The donor area for the pedicle flap was then injected with anesthesia.  The flap was excised through the skin and subcutaneous tissue down to the layer of the underlying musculature.  The pedicle flap was carefully excised within this deep plane to maintain its blood supply.  The edges of the donor site were undermined.   The donor site was closed in a primary fashion.  The pedicle was then rotated into position and sutured.  Once the tube was sutured into place, adequate blood supply was confirmed with blanching and refill.  The pedicle was then wrapped with xeroform gauze and dressed appropriately with a telfa and gauze bandage to ensure continued blood supply and protect the attached pedicle. Lip Wedge Excision Repair Text: Given the location of the defect and the proximity to free margins a full thickness wedge repair was deemed most appropriate.  Using a sterile surgical marker, the appropriate repair was drawn incorporating the defect and placing the expected incisions perpendicular to the vermilion border.  The vermilion border was also meticulously outlined to ensure appropriate reapproximation during the repair.  The area thus outlined was incised through and through with a #15 scalpel blade.  The muscularis and dermis were reaproximated with deep sutures following hemostasis. Care was taken to realign the vermilion border before proceeding with the superficial closure.  Once the vermilion was realigned the superfical and mucosal closure was finished. Ftsg Text: The defect edges were debeveled with a #15 scalpel blade.  Given the location of the defect, shape of the defect and the proximity to free margins a full thickness skin graft was deemed most appropriate.  Using a sterile surgical marker, the primary defect shape was transferred to the donor site. The area thus outlined was incised deep to adipose tissue with a #15 scalpel blade.  The harvested graft was then trimmed of adipose tissue until only dermis and epidermis was left.  The skin margins of the secondary defect were undermined to an appropriate distance in all directions utilizing iris scissors.  The secondary defect was closed with interrupted buried subcutaneous sutures.  The skin edges were then re-apposed with running  sutures.  The skin graft was then placed in the primary defect and oriented appropriately. Split-Thickness Skin Graft Text: The defect edges were debeveled with a #15 scalpel blade.  Given the location of the defect, shape of the defect and the proximity to free margins a split thickness skin graft was deemed most appropriate.  Using a sterile surgical marker, the primary defect shape was transferred to the donor site. The split thickness graft was then harvested.  The skin graft was then placed in the primary defect and oriented appropriately. Burow's Graft Text: The defect edges were debeveled with a #15 scalpel blade.  Given the location of the defect, shape of the defect, the proximity to free margins and the presence of a standing cone deformity a Burow's skin graft was deemed most appropriate. The standing cone was removed and this tissue was then trimmed to the shape of the primary defect. The adipose tissue was also removed until only dermis and epidermis were left.  The skin margins of the secondary defect were undermined to an appropriate distance in all directions utilizing iris scissors.  The secondary defect was closed with interrupted buried subcutaneous sutures.  The skin edges were then re-apposed with running  sutures.  The skin graft was then placed in the primary defect and oriented appropriately. Cartilage Graft Text: The defect edges were debeveled with a #15 scalpel blade.  Given the location of the defect, shape of the defect, the fact the defect involved a full thickness cartilage defect a cartilage graft was deemed most appropriate.  An appropriate donor site was identified, cleansed, and anesthetized. The cartilage graft was then harvested and transferred to the recipient site, oriented appropriately and then sutured into place.  The secondary defect was then repaired using a primary closure. Composite Graft Text: The defect edges were debeveled with a #15 scalpel blade.  Given the location of the defect, shape of the defect, the proximity to free margins and the fact the defect was full thickness a composite graft was deemed most appropriate.  The defect was outline and then transferred to the donor site.  A full thickness graft was then excised from the donor site. The graft was then placed in the primary defect, oriented appropriately and then sutured into place.  The secondary defect was then repaired using a primary closure. Epidermal Autograft Text: The defect edges were debeveled with a #15 scalpel blade.  Given the location of the defect, shape of the defect and the proximity to free margins an epidermal autograft was deemed most appropriate.  Using a sterile surgical marker, the primary defect shape was transferred to the donor site. The epidermal graft was then harvested.  The skin graft was then placed in the primary defect and oriented appropriately. Dermal Autograft Text: The defect edges were debeveled with a #15 scalpel blade.  Given the location of the defect, shape of the defect and the proximity to free margins a dermal autograft was deemed most appropriate.  Using a sterile surgical marker, the primary defect shape was transferred to the donor site. The area thus outlined was incised deep to adipose tissue with a #15 scalpel blade.  The harvested graft was then trimmed of adipose and epidermal tissue until only dermis was left.  The skin graft was then placed in the primary defect and oriented appropriately. Skin Substitute Text: The defect edges were debeveled with a #15 scalpel blade.  Given the location of the defect, shape of the defect and the proximity to free margins a skin substitute graft was deemed most appropriate.  The graft material was trimmed to fit the size of the defect. The graft was then placed in the primary defect and oriented appropriately. Tissue Cultured Epidermal Autograft Text: The defect edges were debeveled with a #15 scalpel blade.  Given the location of the defect, shape of the defect and the proximity to free margins a tissue cultured epidermal autograft was deemed most appropriate.  The graft was then trimmed to fit the size of the defect.  The graft was then placed in the primary defect and oriented appropriately. Xenograft Text: The defect edges were debeveled with a #15 scalpel blade.  Given the location of the defect, shape of the defect and the proximity to free margins a xenograft was deemed most appropriate.  The graft was then trimmed to fit the size of the defect.  The graft was then placed in the primary defect and oriented appropriately. Purse String (Intermediate) Text: Given the location of the defect and the characteristics of the surrounding skin a purse string intermediate closure was deemed most appropriate.  Undermining was performed circumferentially around the surgical defect.  A purse string suture was then placed and tightened. Purse String (Simple) Text: Given the location of the defect and the characteristics of the surrounding skin a purse string simple closure was deemed most appropriate.  Undermining was performed circumferentially around the surgical defect.  A purse string suture was then placed and tightened. Complex Repair And Single Advancement Flap Text: The defect edges were debeveled with a #15 scalpel blade.  The primary defect was closed partially with a complex linear closure.  Given the location of the remaining defect, shape of the defect and the proximity to free margins a single advancement flap was deemed most appropriate for complete closure of the defect.  Using a sterile surgical marker, an appropriate advancement flap was drawn incorporating the defect and placing the expected incisions within the relaxed skin tension lines where possible.    The area thus outlined was incised deep to adipose tissue with a #15 scalpel blade.  The skin margins were undermined to an appropriate distance in all directions utilizing iris scissors. Complex Repair And Double Advancement Flap Text: The defect edges were debeveled with a #15 scalpel blade.  The primary defect was closed partially with a complex linear closure.  Given the location of the remaining defect, shape of the defect and the proximity to free margins a double advancement flap was deemed most appropriate for complete closure of the defect.  Using a sterile surgical marker, an appropriate advancement flap was drawn incorporating the defect and placing the expected incisions within the relaxed skin tension lines where possible.    The area thus outlined was incised deep to adipose tissue with a #15 scalpel blade.  The skin margins were undermined to an appropriate distance in all directions utilizing iris scissors. Complex Repair And Modified Advancement Flap Text: The defect edges were debeveled with a #15 scalpel blade.  The primary defect was closed partially with a complex linear closure.  Given the location of the remaining defect, shape of the defect and the proximity to free margins a modified advancement flap was deemed most appropriate for complete closure of the defect.  Using a sterile surgical marker, an appropriate advancement flap was drawn incorporating the defect and placing the expected incisions within the relaxed skin tension lines where possible.    The area thus outlined was incised deep to adipose tissue with a #15 scalpel blade.  The skin margins were undermined to an appropriate distance in all directions utilizing iris scissors. Complex Repair And A-T Advancement Flap Text: The defect edges were debeveled with a #15 scalpel blade.  The primary defect was closed partially with a complex linear closure.  Given the location of the remaining defect, shape of the defect and the proximity to free margins an A-T advancement flap was deemed most appropriate for complete closure of the defect.  Using a sterile surgical marker, an appropriate advancement flap was drawn incorporating the defect and placing the expected incisions within the relaxed skin tension lines where possible.    The area thus outlined was incised deep to adipose tissue with a #15 scalpel blade.  The skin margins were undermined to an appropriate distance in all directions utilizing iris scissors. Complex Repair And O-T Advancement Flap Text: The defect edges were debeveled with a #15 scalpel blade.  The primary defect was closed partially with a complex linear closure.  Given the location of the remaining defect, shape of the defect and the proximity to free margins an O-T advancement flap was deemed most appropriate for complete closure of the defect.  Using a sterile surgical marker, an appropriate advancement flap was drawn incorporating the defect and placing the expected incisions within the relaxed skin tension lines where possible.    The area thus outlined was incised deep to adipose tissue with a #15 scalpel blade.  The skin margins were undermined to an appropriate distance in all directions utilizing iris scissors. Complex Repair And O-L Flap Text: The defect edges were debeveled with a #15 scalpel blade.  The primary defect was closed partially with a complex linear closure.  Given the location of the remaining defect, shape of the defect and the proximity to free margins an O-L flap was deemed most appropriate for complete closure of the defect.  Using a sterile surgical marker, an appropriate flap was drawn incorporating the defect and placing the expected incisions within the relaxed skin tension lines where possible.    The area thus outlined was incised deep to adipose tissue with a #15 scalpel blade.  The skin margins were undermined to an appropriate distance in all directions utilizing iris scissors. Complex Repair And Bilobe Flap Text: The defect edges were debeveled with a #15 scalpel blade.  The primary defect was closed partially with a complex linear closure.  Given the location of the remaining defect, shape of the defect and the proximity to free margins a bilobe flap was deemed most appropriate for complete closure of the defect.  Using a sterile surgical marker, an appropriate advancement flap was drawn incorporating the defect and placing the expected incisions within the relaxed skin tension lines where possible.    The area thus outlined was incised deep to adipose tissue with a #15 scalpel blade.  The skin margins were undermined to an appropriate distance in all directions utilizing iris scissors. Complex Repair And Melolabial Flap Text: The defect edges were debeveled with a #15 scalpel blade.  The primary defect was closed partially with a complex linear closure.  Given the location of the remaining defect, shape of the defect and the proximity to free margins a melolabial flap was deemed most appropriate for complete closure of the defect.  Using a sterile surgical marker, an appropriate advancement flap was drawn incorporating the defect and placing the expected incisions within the relaxed skin tension lines where possible.    The area thus outlined was incised deep to adipose tissue with a #15 scalpel blade.  The skin margins were undermined to an appropriate distance in all directions utilizing iris scissors. Complex Repair And Rotation Flap Text: The defect edges were debeveled with a #15 scalpel blade.  The primary defect was closed partially with a complex linear closure.  Given the location of the remaining defect, shape of the defect and the proximity to free margins a rotation flap was deemed most appropriate for complete closure of the defect.  Using a sterile surgical marker, an appropriate advancement flap was drawn incorporating the defect and placing the expected incisions within the relaxed skin tension lines where possible.    The area thus outlined was incised deep to adipose tissue with a #15 scalpel blade.  The skin margins were undermined to an appropriate distance in all directions utilizing iris scissors. Complex Repair And Rhombic Flap Text: The defect edges were debeveled with a #15 scalpel blade.  The primary defect was closed partially with a complex linear closure.  Given the location of the remaining defect, shape of the defect and the proximity to free margins a rhombic flap was deemed most appropriate for complete closure of the defect.  Using a sterile surgical marker, an appropriate advancement flap was drawn incorporating the defect and placing the expected incisions within the relaxed skin tension lines where possible.    The area thus outlined was incised deep to adipose tissue with a #15 scalpel blade.  The skin margins were undermined to an appropriate distance in all directions utilizing iris scissors. Complex Repair And Transposition Flap Text: The defect edges were debeveled with a #15 scalpel blade.  The primary defect was closed partially with a complex linear closure.  Given the location of the remaining defect, shape of the defect and the proximity to free margins a transposition flap was deemed most appropriate for complete closure of the defect.  Using a sterile surgical marker, an appropriate advancement flap was drawn incorporating the defect and placing the expected incisions within the relaxed skin tension lines where possible.    The area thus outlined was incised deep to adipose tissue with a #15 scalpel blade.  The skin margins were undermined to an appropriate distance in all directions utilizing iris scissors. Complex Repair And V-Y Plasty Text: The defect edges were debeveled with a #15 scalpel blade.  The primary defect was closed partially with a complex linear closure.  Given the location of the remaining defect, shape of the defect and the proximity to free margins a V-Y plasty was deemed most appropriate for complete closure of the defect.  Using a sterile surgical marker, an appropriate advancement flap was drawn incorporating the defect and placing the expected incisions within the relaxed skin tension lines where possible.    The area thus outlined was incised deep to adipose tissue with a #15 scalpel blade.  The skin margins were undermined to an appropriate distance in all directions utilizing iris scissors. Complex Repair And M Plasty Text: The defect edges were debeveled with a #15 scalpel blade.  The primary defect was closed partially with a complex linear closure.  Given the location of the remaining defect, shape of the defect and the proximity to free margins an M plasty was deemed most appropriate for complete closure of the defect.  Using a sterile surgical marker, an appropriate advancement flap was drawn incorporating the defect and placing the expected incisions within the relaxed skin tension lines where possible.    The area thus outlined was incised deep to adipose tissue with a #15 scalpel blade.  The skin margins were undermined to an appropriate distance in all directions utilizing iris scissors. Complex Repair And Double M Plasty Text: The defect edges were debeveled with a #15 scalpel blade.  The primary defect was closed partially with a complex linear closure.  Given the location of the remaining defect, shape of the defect and the proximity to free margins a double M plasty was deemed most appropriate for complete closure of the defect.  Using a sterile surgical marker, an appropriate advancement flap was drawn incorporating the defect and placing the expected incisions within the relaxed skin tension lines where possible.    The area thus outlined was incised deep to adipose tissue with a #15 scalpel blade.  The skin margins were undermined to an appropriate distance in all directions utilizing iris scissors. Complex Repair And W Plasty Text: The defect edges were debeveled with a #15 scalpel blade.  The primary defect was closed partially with a complex linear closure.  Given the location of the remaining defect, shape of the defect and the proximity to free margins a W plasty was deemed most appropriate for complete closure of the defect.  Using a sterile surgical marker, an appropriate advancement flap was drawn incorporating the defect and placing the expected incisions within the relaxed skin tension lines where possible.    The area thus outlined was incised deep to adipose tissue with a #15 scalpel blade.  The skin margins were undermined to an appropriate distance in all directions utilizing iris scissors. Complex Repair And Z Plasty Text: The defect edges were debeveled with a #15 scalpel blade.  The primary defect was closed partially with a complex linear closure.  Given the location of the remaining defect, shape of the defect and the proximity to free margins a Z plasty was deemed most appropriate for complete closure of the defect.  Using a sterile surgical marker, an appropriate advancement flap was drawn incorporating the defect and placing the expected incisions within the relaxed skin tension lines where possible.    The area thus outlined was incised deep to adipose tissue with a #15 scalpel blade.  The skin margins were undermined to an appropriate distance in all directions utilizing iris scissors. Complex Repair And Dorsal Nasal Flap Text: The defect edges were debeveled with a #15 scalpel blade.  The primary defect was closed partially with a complex linear closure.  Given the location of the remaining defect, shape of the defect and the proximity to free margins a dorsal nasal flap was deemed most appropriate for complete closure of the defect.  Using a sterile surgical marker, an appropriate flap was drawn incorporating the defect and placing the expected incisions within the relaxed skin tension lines where possible.    The area thus outlined was incised deep to adipose tissue with a #15 scalpel blade.  The skin margins were undermined to an appropriate distance in all directions utilizing iris scissors. Complex Repair And Ftsg Text: The defect edges were debeveled with a #15 scalpel blade.  The primary defect was closed partially with a complex linear closure.  Given the location of the defect, shape of the defect and the proximity to free margins a full thickness skin graft was deemed most appropriate to repair the remaining defect.  The graft was trimmed to fit the size of the remaining defect.  The graft was then placed in the primary defect, oriented appropriately, and sutured into place. Complex Repair And Burow's Graft Text: The defect edges were debeveled with a #15 scalpel blade.  The primary defect was closed partially with a complex linear closure.  Given the location of the defect, shape of the defect, the proximity to free margins and the presence of a standing cone deformity a Burow's graft was deemed most appropriate to repair the remaining defect.  The graft was trimmed to fit the size of the remaining defect.  The graft was then placed in the primary defect, oriented appropriately, and sutured into place. Complex Repair And Split-Thickness Skin Graft Text: The defect edges were debeveled with a #15 scalpel blade.  The primary defect was closed partially with a complex linear closure.  Given the location of the defect, shape of the defect and the proximity to free margins a split thickness skin graft was deemed most appropriate to repair the remaining defect.  The graft was trimmed to fit the size of the remaining defect.  The graft was then placed in the primary defect, oriented appropriately, and sutured into place. Complex Repair And Epidermal Autograft Text: The defect edges were debeveled with a #15 scalpel blade.  The primary defect was closed partially with a complex linear closure.  Given the location of the defect, shape of the defect and the proximity to free margins an epidermal autograft was deemed most appropriate to repair the remaining defect.  The graft was trimmed to fit the size of the remaining defect.  The graft was then placed in the primary defect, oriented appropriately, and sutured into place. Complex Repair And Dermal Autograft Text: The defect edges were debeveled with a #15 scalpel blade.  The primary defect was closed partially with a complex linear closure.  Given the location of the defect, shape of the defect and the proximity to free margins an dermal autograft was deemed most appropriate to repair the remaining defect.  The graft was trimmed to fit the size of the remaining defect.  The graft was then placed in the primary defect, oriented appropriately, and sutured into place. Complex Repair And Tissue Cultured Epidermal Autograft Text: The defect edges were debeveled with a #15 scalpel blade.  The primary defect was closed partially with a complex linear closure.  Given the location of the defect, shape of the defect and the proximity to free margins an tissue cultured epidermal autograft was deemed most appropriate to repair the remaining defect.  The graft was trimmed to fit the size of the remaining defect.  The graft was then placed in the primary defect, oriented appropriately, and sutured into place. Complex Repair And Xenograft Text: The defect edges were debeveled with a #15 scalpel blade.  The primary defect was closed partially with a complex linear closure.  Given the location of the defect, shape of the defect and the proximity to free margins a xenograft was deemed most appropriate to repair the remaining defect.  The graft was trimmed to fit the size of the remaining defect.  The graft was then placed in the primary defect, oriented appropriately, and sutured into place. Complex Repair And Skin Substitute Graft Text: The defect edges were debeveled with a #15 scalpel blade.  The primary defect was closed partially with a complex linear closure.  Given the location of the remaining defect, shape of the defect and the proximity to free margins a skin substitute graft was deemed most appropriate to repair the remaining defect.  The graft was trimmed to fit the size of the remaining defect.  The graft was then placed in the primary defect, oriented appropriately, and sutured into place. Consent was obtained from the patient. The risks and benefits to therapy were discussed in detail. Specifically, the risks of infection, scarring, bleeding, prolonged wound healing, incomplete removal, allergy to anesthesia, nerve injury and recurrence were addressed. Prior to the procedure, the treatment site was clearly identified and confirmed by the patient. All components of Universal Protocol/PAUSE Rule completed. Render Post-Care Instructions In Note?: yes Post-Care Instructions: I reviewed with the patient in detail post-care instructions. Patient is not to engage in any heavy lifting, exercise, or swimming for the next 14 days. Should the patient develop any fevers, chills, bleeding, severe pain patient will contact the office immediately. Home Suture Removal Text: Patient was provided a home suture removal kit and will remove their sutures at home.  If they have any questions or difficulties they will call the office. Where Do You Want The Question To Include Opioid Counseling Located?: Case Summary Tab Billing Type: Third-Party Bill Information: Selecting Yes will display possible errors in your note based on the variables you have selected. This validation is only offered as a suggestion for you. PLEASE NOTE THAT THE VALIDATION TEXT WILL BE REMOVED WHEN YOU FINALIZE YOUR NOTE. IF YOU WANT TO FAX A PRELIMINARY NOTE YOU WILL NEED TO TOGGLE THIS TO 'NO' IF YOU DO NOT WANT IT IN YOUR FAXED NOTE. Size Of Lesion In Cm: 0.8

## 2024-12-12 ENCOUNTER — TELEPHONE (OUTPATIENT)
Dept: PULMONOLOGY | Facility: CLINIC | Age: 74
End: 2024-12-12
Payer: MEDICARE

## 2024-12-12 ENCOUNTER — OFFICE VISIT (OUTPATIENT)
Dept: PULMONOLOGY | Facility: CLINIC | Age: 74
End: 2024-12-12
Payer: MEDICARE

## 2024-12-12 ENCOUNTER — HOSPITAL ENCOUNTER (INPATIENT)
Facility: HOSPITAL | Age: 74
LOS: 2 days | Discharge: HOME | DRG: 196 | End: 2024-12-16
Attending: STUDENT IN AN ORGANIZED HEALTH CARE EDUCATION/TRAINING PROGRAM | Admitting: STUDENT IN AN ORGANIZED HEALTH CARE EDUCATION/TRAINING PROGRAM
Payer: MEDICARE

## 2024-12-12 VITALS
WEIGHT: 137.38 LBS | HEIGHT: 68 IN | DIASTOLIC BLOOD PRESSURE: 82 MMHG | HEART RATE: 133 BPM | SYSTOLIC BLOOD PRESSURE: 140 MMHG | OXYGEN SATURATION: 92 % | BODY MASS INDEX: 20.82 KG/M2

## 2024-12-12 DIAGNOSIS — R07.9 CHEST PAIN: ICD-10-CM

## 2024-12-12 DIAGNOSIS — E11.65 TYPE 2 DIABETES MELLITUS WITH HYPERGLYCEMIA, WITHOUT LONG-TERM CURRENT USE OF INSULIN: ICD-10-CM

## 2024-12-12 DIAGNOSIS — J96.11 CHRONIC HYPOXEMIC RESPIRATORY FAILURE: ICD-10-CM

## 2024-12-12 DIAGNOSIS — J84.9 INTERSTITIAL LUNG DISEASE: ICD-10-CM

## 2024-12-12 DIAGNOSIS — J96.11 CHRONIC HYPOXEMIC RESPIRATORY FAILURE: Primary | ICD-10-CM

## 2024-12-12 DIAGNOSIS — R55 SYNCOPE: ICD-10-CM

## 2024-12-12 DIAGNOSIS — J84.9 ILD (INTERSTITIAL LUNG DISEASE): ICD-10-CM

## 2024-12-12 DIAGNOSIS — J84.9 INTERSTITIAL LUNG DISEASE: Primary | ICD-10-CM

## 2024-12-12 LAB
ADENOVIRUS: NOT DETECTED
ALBUMIN SERPL BCP-MCNC: 3.2 G/DL (ref 3.5–5.2)
ALLENS TEST: ABNORMAL
ALLENS TEST: NORMAL
ALP SERPL-CCNC: 57 U/L (ref 40–150)
ALT SERPL W/O P-5'-P-CCNC: 10 U/L (ref 10–44)
ANION GAP SERPL CALC-SCNC: 10 MMOL/L (ref 8–16)
AST SERPL-CCNC: 17 U/L (ref 10–40)
BASOPHILS # BLD AUTO: 0.02 K/UL (ref 0–0.2)
BASOPHILS NFR BLD: 0.3 % (ref 0–1.9)
BILIRUB SERPL-MCNC: 0.6 MG/DL (ref 0.1–1)
BNP SERPL-MCNC: 107 PG/ML (ref 0–99)
BORDETELLA PARAPERTUSSIS (IS1001): NOT DETECTED
BORDETELLA PERTUSSIS (PTXP): NOT DETECTED
BUN SERPL-MCNC: 7 MG/DL (ref 8–23)
CALCIUM SERPL-MCNC: 9 MG/DL (ref 8.7–10.5)
CHLAMYDIA PNEUMONIAE: NOT DETECTED
CHLORIDE SERPL-SCNC: 104 MMOL/L (ref 95–110)
CO2 SERPL-SCNC: 24 MMOL/L (ref 23–29)
CORONAVIRUS 229E, COMMON COLD VIRUS: NOT DETECTED
CORONAVIRUS HKU1, COMMON COLD VIRUS: NOT DETECTED
CORONAVIRUS NL63, COMMON COLD VIRUS: NOT DETECTED
CORONAVIRUS OC43, COMMON COLD VIRUS: NOT DETECTED
CREAT SERPL-MCNC: 0.7 MG/DL (ref 0.5–1.4)
DIFFERENTIAL METHOD BLD: ABNORMAL
EOSINOPHIL # BLD AUTO: 0.3 K/UL (ref 0–0.5)
EOSINOPHIL NFR BLD: 3.8 % (ref 0–8)
ERYTHROCYTE [DISTWIDTH] IN BLOOD BY AUTOMATED COUNT: 13.8 % (ref 11.5–14.5)
EST. GFR  (NO RACE VARIABLE): >60 ML/MIN/1.73 M^2
FLUBV RNA NPH QL NAA+NON-PROBE: NOT DETECTED
GLUCOSE SERPL-MCNC: 70 MG/DL (ref 70–110)
HCO3 UR-SCNC: 25.1 MMOL/L (ref 24–28)
HCT VFR BLD AUTO: 37.8 % (ref 40–54)
HGB BLD-MCNC: 13.1 G/DL (ref 14–18)
HPIV1 RNA NPH QL NAA+NON-PROBE: NOT DETECTED
HPIV2 RNA NPH QL NAA+NON-PROBE: NOT DETECTED
HPIV3 RNA NPH QL NAA+NON-PROBE: NOT DETECTED
HPIV4 RNA NPH QL NAA+NON-PROBE: NOT DETECTED
HUMAN METAPNEUMOVIRUS: NOT DETECTED
IMM GRANULOCYTES # BLD AUTO: 0.01 K/UL (ref 0–0.04)
IMM GRANULOCYTES NFR BLD AUTO: 0.2 % (ref 0–0.5)
INFLUENZA A (SUBTYPES H1,H1-2009,H3): NOT DETECTED
LDH SERPL L TO P-CCNC: 0.79 MMOL/L (ref 0.5–2.2)
LYMPHOCYTES # BLD AUTO: 0.9 K/UL (ref 1–4.8)
LYMPHOCYTES NFR BLD: 14.2 % (ref 18–48)
MAGNESIUM SERPL-MCNC: 1.5 MG/DL (ref 1.6–2.6)
MCH RBC QN AUTO: 28.6 PG (ref 27–31)
MCHC RBC AUTO-ENTMCNC: 34.7 G/DL (ref 32–36)
MCV RBC AUTO: 83 FL (ref 82–98)
MONOCYTES # BLD AUTO: 0.6 K/UL (ref 0.3–1)
MONOCYTES NFR BLD: 8.4 % (ref 4–15)
MYCOPLASMA PNEUMONIAE: NOT DETECTED
NEUTROPHILS # BLD AUTO: 4.8 K/UL (ref 1.8–7.7)
NEUTROPHILS NFR BLD: 73.1 % (ref 38–73)
NRBC BLD-RTO: 0 /100 WBC
PCO2 BLDA: 42.4 MMHG (ref 35–45)
PH SMN: 7.38 [PH] (ref 7.35–7.45)
PLATELET # BLD AUTO: 209 K/UL (ref 150–450)
PMV BLD AUTO: 9.9 FL (ref 9.2–12.9)
PO2 BLDA: 20 MMHG (ref 40–60)
POC BE: 0 MMOL/L
POC SATURATED O2: 31 % (ref 95–100)
POC TCO2: 26 MMOL/L (ref 24–29)
POCT GLUCOSE: 74 MG/DL (ref 70–110)
POTASSIUM SERPL-SCNC: 3.2 MMOL/L (ref 3.5–5.1)
PROT SERPL-MCNC: 6.6 G/DL (ref 6–8.4)
RBC # BLD AUTO: 4.58 M/UL (ref 4.6–6.2)
RESPIRATORY INFECTION PANEL SOURCE: NORMAL
RSV RNA NPH QL NAA+NON-PROBE: NOT DETECTED
RV+EV RNA NPH QL NAA+NON-PROBE: NOT DETECTED
SAMPLE: ABNORMAL
SAMPLE: NORMAL
SARS-COV-2 RDRP RESP QL NAA+PROBE: NEGATIVE
SARS-COV-2 RNA RESP QL NAA+PROBE: NOT DETECTED
SITE: ABNORMAL
SITE: NORMAL
SODIUM SERPL-SCNC: 138 MMOL/L (ref 136–145)
TROPONIN I SERPL DL<=0.01 NG/ML-MCNC: 7 NG/L (ref 0–35)
TROPONIN I SERPL DL<=0.01 NG/ML-MCNC: 8 NG/L (ref 0–35)
WBC # BLD AUTO: 6.56 K/UL (ref 3.9–12.7)

## 2024-12-12 PROCEDURE — 84484 ASSAY OF TROPONIN QUANT: CPT | Mod: 91,HCNC | Performed by: PHYSICIAN ASSISTANT

## 2024-12-12 PROCEDURE — 82962 GLUCOSE BLOOD TEST: CPT | Mod: HCNC

## 2024-12-12 PROCEDURE — 3288F FALL RISK ASSESSMENT DOCD: CPT | Mod: HCNC,CPTII,S$GLB, | Performed by: INTERNAL MEDICINE

## 2024-12-12 PROCEDURE — 84484 ASSAY OF TROPONIN QUANT: CPT | Mod: HCNC | Performed by: STUDENT IN AN ORGANIZED HEALTH CARE EDUCATION/TRAINING PROGRAM

## 2024-12-12 PROCEDURE — G0378 HOSPITAL OBSERVATION PER HR: HCPCS | Mod: HCNC

## 2024-12-12 PROCEDURE — 82803 BLOOD GASES ANY COMBINATION: CPT | Mod: HCNC

## 2024-12-12 PROCEDURE — 3066F NEPHROPATHY DOC TX: CPT | Mod: HCNC,CPTII,S$GLB, | Performed by: INTERNAL MEDICINE

## 2024-12-12 PROCEDURE — 3077F SYST BP >= 140 MM HG: CPT | Mod: HCNC,CPTII,S$GLB, | Performed by: INTERNAL MEDICINE

## 2024-12-12 PROCEDURE — 3008F BODY MASS INDEX DOCD: CPT | Mod: HCNC,CPTII,S$GLB, | Performed by: INTERNAL MEDICINE

## 2024-12-12 PROCEDURE — 3062F POS MACROALBUMINURIA REV: CPT | Mod: HCNC,CPTII,S$GLB, | Performed by: INTERNAL MEDICINE

## 2024-12-12 PROCEDURE — 99214 OFFICE O/P EST MOD 30 MIN: CPT | Mod: HCNC,S$GLB,, | Performed by: INTERNAL MEDICINE

## 2024-12-12 PROCEDURE — 4010F ACE/ARB THERAPY RXD/TAKEN: CPT | Mod: HCNC,CPTII,S$GLB, | Performed by: INTERNAL MEDICINE

## 2024-12-12 PROCEDURE — 96365 THER/PROPH/DIAG IV INF INIT: CPT | Mod: HCNC

## 2024-12-12 PROCEDURE — 1159F MED LIST DOCD IN RCRD: CPT | Mod: HCNC,CPTII,S$GLB, | Performed by: INTERNAL MEDICINE

## 2024-12-12 PROCEDURE — 99900035 HC TECH TIME PER 15 MIN (STAT): Mod: HCNC

## 2024-12-12 PROCEDURE — 96366 THER/PROPH/DIAG IV INF ADDON: CPT | Mod: HCNC

## 2024-12-12 PROCEDURE — 85025 COMPLETE CBC W/AUTO DIFF WBC: CPT | Mod: HCNC | Performed by: PHYSICIAN ASSISTANT

## 2024-12-12 PROCEDURE — 63600175 PHARM REV CODE 636 W HCPCS: Mod: HCNC | Performed by: STUDENT IN AN ORGANIZED HEALTH CARE EDUCATION/TRAINING PROGRAM

## 2024-12-12 PROCEDURE — 83880 ASSAY OF NATRIURETIC PEPTIDE: CPT | Mod: HCNC | Performed by: PHYSICIAN ASSISTANT

## 2024-12-12 PROCEDURE — 93005 ELECTROCARDIOGRAM TRACING: CPT | Mod: HCNC

## 2024-12-12 PROCEDURE — 83605 ASSAY OF LACTIC ACID: CPT | Mod: HCNC

## 2024-12-12 PROCEDURE — 1101F PT FALLS ASSESS-DOCD LE1/YR: CPT | Mod: HCNC,CPTII,S$GLB, | Performed by: INTERNAL MEDICINE

## 2024-12-12 PROCEDURE — 93010 ELECTROCARDIOGRAM REPORT: CPT | Mod: HCNC,,, | Performed by: INTERNAL MEDICINE

## 2024-12-12 PROCEDURE — 80053 COMPREHEN METABOLIC PANEL: CPT | Mod: HCNC | Performed by: PHYSICIAN ASSISTANT

## 2024-12-12 PROCEDURE — 99999 PR PBB SHADOW E&M-EST. PATIENT-LVL IV: CPT | Mod: PBBFAC,HCNC,, | Performed by: INTERNAL MEDICINE

## 2024-12-12 PROCEDURE — 87635 SARS-COV-2 COVID-19 AMP PRB: CPT | Mod: HCNC | Performed by: PHYSICIAN ASSISTANT

## 2024-12-12 PROCEDURE — 87798 DETECT AGENT NOS DNA AMP: CPT | Mod: 59,HCNC

## 2024-12-12 PROCEDURE — 99285 EMERGENCY DEPT VISIT HI MDM: CPT | Mod: 25,HCNC

## 2024-12-12 PROCEDURE — 3052F HG A1C>EQUAL 8.0%<EQUAL 9.0%: CPT | Mod: HCNC,CPTII,S$GLB, | Performed by: INTERNAL MEDICINE

## 2024-12-12 PROCEDURE — 87502 INFLUENZA DNA AMP PROBE: CPT | Mod: HCNC | Performed by: PHYSICIAN ASSISTANT

## 2024-12-12 PROCEDURE — 87798 DETECT AGENT NOS DNA AMP: CPT | Mod: HCNC | Performed by: PHYSICIAN ASSISTANT

## 2024-12-12 PROCEDURE — 83735 ASSAY OF MAGNESIUM: CPT | Mod: HCNC | Performed by: PHYSICIAN ASSISTANT

## 2024-12-12 PROCEDURE — 3079F DIAST BP 80-89 MM HG: CPT | Mod: HCNC,CPTII,S$GLB, | Performed by: INTERNAL MEDICINE

## 2024-12-12 PROCEDURE — 87040 BLOOD CULTURE FOR BACTERIA: CPT | Mod: HCNC | Performed by: STUDENT IN AN ORGANIZED HEALTH CARE EDUCATION/TRAINING PROGRAM

## 2024-12-12 PROCEDURE — 1126F AMNT PAIN NOTED NONE PRSNT: CPT | Mod: HCNC,CPTII,S$GLB, | Performed by: INTERNAL MEDICINE

## 2024-12-12 PROCEDURE — 25500020 PHARM REV CODE 255: Mod: HCNC | Performed by: STUDENT IN AN ORGANIZED HEALTH CARE EDUCATION/TRAINING PROGRAM

## 2024-12-12 RX ORDER — IBUPROFEN 200 MG
16 TABLET ORAL
Status: DISCONTINUED | OUTPATIENT
Start: 2024-12-13 | End: 2024-12-16 | Stop reason: HOSPADM

## 2024-12-12 RX ORDER — TAMSULOSIN HYDROCHLORIDE 0.4 MG/1
0.4 CAPSULE ORAL DAILY
Status: DISCONTINUED | OUTPATIENT
Start: 2024-12-13 | End: 2024-12-16 | Stop reason: HOSPADM

## 2024-12-12 RX ORDER — ISOSORBIDE MONONITRATE 30 MG/1
30 TABLET, EXTENDED RELEASE ORAL DAILY
Status: DISCONTINUED | OUTPATIENT
Start: 2024-12-13 | End: 2024-12-16 | Stop reason: HOSPADM

## 2024-12-12 RX ORDER — ATORVASTATIN CALCIUM 40 MG/1
40 TABLET, FILM COATED ORAL NIGHTLY
Status: DISCONTINUED | OUTPATIENT
Start: 2024-12-13 | End: 2024-12-16 | Stop reason: HOSPADM

## 2024-12-12 RX ORDER — GLUCAGON 1 MG
1 KIT INJECTION
Status: DISCONTINUED | OUTPATIENT
Start: 2024-12-13 | End: 2024-12-16 | Stop reason: HOSPADM

## 2024-12-12 RX ORDER — IPRATROPIUM BROMIDE AND ALBUTEROL SULFATE 2.5; .5 MG/3ML; MG/3ML
3 SOLUTION RESPIRATORY (INHALATION) EVERY 6 HOURS PRN
Status: DISCONTINUED | OUTPATIENT
Start: 2024-12-13 | End: 2024-12-13

## 2024-12-12 RX ORDER — NALOXONE HCL 0.4 MG/ML
0.02 VIAL (ML) INJECTION
Status: DISCONTINUED | OUTPATIENT
Start: 2024-12-13 | End: 2024-12-16 | Stop reason: HOSPADM

## 2024-12-12 RX ORDER — IBUPROFEN 200 MG
24 TABLET ORAL
Status: DISCONTINUED | OUTPATIENT
Start: 2024-12-13 | End: 2024-12-12

## 2024-12-12 RX ORDER — SODIUM CHLORIDE 0.9 % (FLUSH) 0.9 %
10 SYRINGE (ML) INJECTION EVERY 12 HOURS PRN
Status: DISCONTINUED | OUTPATIENT
Start: 2024-12-13 | End: 2024-12-16 | Stop reason: HOSPADM

## 2024-12-12 RX ORDER — SACUBITRIL AND VALSARTAN 24; 26 MG/1; MG/1
1 TABLET, FILM COATED ORAL 2 TIMES DAILY
Status: DISCONTINUED | OUTPATIENT
Start: 2024-12-13 | End: 2024-12-14

## 2024-12-12 RX ORDER — FUROSEMIDE 20 MG/1
20 TABLET ORAL DAILY
Status: DISCONTINUED | OUTPATIENT
Start: 2024-12-13 | End: 2024-12-14

## 2024-12-12 RX ORDER — IBUPROFEN 200 MG
16 TABLET ORAL
Status: DISCONTINUED | OUTPATIENT
Start: 2024-12-13 | End: 2024-12-12

## 2024-12-12 RX ORDER — ACETAMINOPHEN 325 MG/1
650 TABLET ORAL EVERY 6 HOURS PRN
Status: DISCONTINUED | OUTPATIENT
Start: 2024-12-13 | End: 2024-12-16 | Stop reason: HOSPADM

## 2024-12-12 RX ORDER — METOPROLOL SUCCINATE 50 MG/1
50 TABLET, EXTENDED RELEASE ORAL DAILY
Status: DISCONTINUED | OUTPATIENT
Start: 2024-12-13 | End: 2024-12-13

## 2024-12-12 RX ORDER — AMLODIPINE BESYLATE 10 MG/1
10 TABLET ORAL DAILY
Status: DISCONTINUED | OUTPATIENT
Start: 2024-12-13 | End: 2024-12-16 | Stop reason: HOSPADM

## 2024-12-12 RX ORDER — IBUPROFEN 200 MG
24 TABLET ORAL
Status: DISCONTINUED | OUTPATIENT
Start: 2024-12-13 | End: 2024-12-16 | Stop reason: HOSPADM

## 2024-12-12 RX ORDER — BUDESONIDE 0.5 MG/2ML
1 INHALANT ORAL 2 TIMES DAILY
Status: DISCONTINUED | OUTPATIENT
Start: 2024-12-13 | End: 2024-12-15

## 2024-12-12 RX ORDER — MAGNESIUM SULFATE HEPTAHYDRATE 40 MG/ML
2 INJECTION, SOLUTION INTRAVENOUS ONCE
Status: COMPLETED | OUTPATIENT
Start: 2024-12-12 | End: 2024-12-12

## 2024-12-12 RX ORDER — BENZONATATE 100 MG/1
100 CAPSULE ORAL 3 TIMES DAILY PRN
Status: DISCONTINUED | OUTPATIENT
Start: 2024-12-13 | End: 2024-12-16 | Stop reason: HOSPADM

## 2024-12-12 RX ORDER — ENOXAPARIN SODIUM 100 MG/ML
40 INJECTION SUBCUTANEOUS EVERY 24 HOURS
Status: DISCONTINUED | OUTPATIENT
Start: 2024-12-13 | End: 2024-12-16 | Stop reason: HOSPADM

## 2024-12-12 RX ORDER — GLUCAGON 1 MG
1 KIT INJECTION
Status: DISCONTINUED | OUTPATIENT
Start: 2024-12-13 | End: 2024-12-12

## 2024-12-12 RX ORDER — INSULIN ASPART 100 [IU]/ML
0-5 INJECTION, SOLUTION INTRAVENOUS; SUBCUTANEOUS
Status: DISCONTINUED | OUTPATIENT
Start: 2024-12-13 | End: 2024-12-14

## 2024-12-12 RX ORDER — FAMOTIDINE 20 MG/1
40 TABLET, FILM COATED ORAL 2 TIMES DAILY
Status: DISCONTINUED | OUTPATIENT
Start: 2024-12-13 | End: 2024-12-16 | Stop reason: HOSPADM

## 2024-12-12 RX ADMIN — IOHEXOL 75 ML: 350 INJECTION, SOLUTION INTRAVENOUS at 06:12

## 2024-12-12 RX ADMIN — MAGNESIUM SULFATE HEPTAHYDRATE 2 G: 40 INJECTION, SOLUTION INTRAVENOUS at 09:12

## 2024-12-12 NOTE — FIRST PROVIDER EVALUATION
" Emergency Department TeleTriage Encounter Note      CHIEF COMPLAINT    Chief Complaint   Patient presents with    Shortness of Breath     Cp/sob while @ pulmonary appointment, arrives on nc o2 speaks in full sentences.       VITAL SIGNS   Initial Vitals [12/12/24 1625]   BP Pulse Resp Temp SpO2   136/86 (!) 122 16 98.6 °F (37 °C) 98 %      MAP       --            ALLERGIES    Review of patient's allergies indicates:   Allergen Reactions    Dapagliflozin      Other reaction(s): Other (See Comments)    Pcn [penicillins]     Linagliptin Other (See Comments)     "it knocked me down", "it almost killed me"    Lisinopril Other (See Comments)     cough    Pantoprazole Hives       PROVIDER TRIAGE NOTE  Patient presents with complaint of worsening shortness of breath.  Saw pulmonology today and was sent to the ER.  Reports associated cough and chest tightness.  Denies fevers at home.      Phy:   Constitutional: well nourished, well developed, appearing stated age, NAD        Initial orders will be placed and care will be transferred to an alternate provider when patient is roomed for a full evaluation. Any additional orders and the final disposition will be determined by that provider.        ORDERS  Labs Reviewed - No data to display    ED Orders (720h ago, onward)      Start Ordered     Status Ordering Provider    12/12/24 1627 12/12/24 1626  EKG 12-lead (Syncope) Age >50  Once        Comments: If patient  is > 50 yrs.    Completed by RADHA PAREKH on 12/12/2024 at  4:45 PM PADMINI ARVIZU              Virtual Visit Note: The provider triage portion of this emergency department evaluation and documentation was performed via [employer], a HIPAA-compliant telemedicine application, in concert with a tele-presenter in the room. A face to face patient evaluation with one of my colleagues will occur once the patient is placed in an emergency department room.      DISCLAIMER: This note was prepared with M*Modal voice recognition " transcription software. Garbled syntax, mangled pronouns, and other bizarre constructions may be attributed to that software system.

## 2024-12-12 NOTE — TELEPHONE ENCOUNTER
I spoke with patient in regards to scheduling a follow up visit per Dr. Schmitt. Patient is scheduled on 12/12/24 at 3:30 pm. Patient confirmed and verbalized understanding.

## 2024-12-12 NOTE — PROGRESS NOTES
Reason for visit:  ILD    Patient ID:  Emmanuel Grant is a 74 y.o. male    Interval History 12/12/24  Since our last visit he has been in and out of the hospital a couple of times.  He is now having palliative home care and they saw him on Tuesday and were concerned for his work of breathing.  He is here today and says that since Saturday his shortness of breath has progressively gotten worse.  He has needed his oxygen constantly and at higher levels and his breathing is very short and he feels like he can not catch his breath.  He also has had fevers and chills over the last couple of days.  He is tachycardic, tachypneic, and hypoxic so sending him to the ER for an acute evaluation    Interval History 9/23/24  Mr. Grant is feeling much better since completing the steroid taper. He is still short of breath and having coughing spells but overall feels better. He is also following with GI and possible having surgery to correct his hiatal hernia and improve his GERD symptoms.     Interval History   Since our last visit he has continued to have significant shortness of breath.  He unfortunately has also had severe acid reflux and stomach pains that are causing him significant discomfort and making his shortness of breath worse.  He has not really noted any improvement since starting inhalers or any of our last treatment.  He does not see GI again until August 10th.  But he is getting progressively worse.     History:  Mr. Grant  is a 73-year-old with newly diagnosed interstitial lung disease he has been admitted at the [facility] twice for shortness of breath and was sent home this last time with oxygen.  He had initial autoimmune evaluation that has been negative thus far.  No history of exposure to medications that would cause this that he is aware of, no family history of lung disease.  No previous heart disease.  He does get short of breath with exertion and moving around a good bit but is mostly able to do what  "he wants to do throughout the day.  He does use a nebulizer he feels that it does seem to help some.    Additional Pulmonary History:  Childhood Illnesses:  none  Occupational:   works in air conditioning in installation and repair  Environmental:   no pets, no seasonal allergies, no carpet in his home and no concern for mold or allergy exposures there  Tobacco/Smoking:   never smoker    Objective:     Vitals:    12/12/24 1530   BP: (!) 140/82   BP Location: Left arm   Patient Position: Sitting   Pulse: (!) 133   SpO2: (!) 92%   Weight: 62.3 kg (137 lb 5.6 oz)   Height: 5' 8" (1.727 m)         Physical Exam  Constitutional:       General: He is not in acute distress.     Appearance: He is not diaphoretic.   HENT:      Head: Normocephalic and atraumatic.      Right Ear: External ear normal.      Left Ear: External ear normal.   Eyes:      Conjunctiva/sclera: Conjunctivae normal.      Pupils: Pupils are equal, round, and reactive to light.   Neck:      Trachea: No tracheal deviation.   Cardiovascular:      Rate and Rhythm: Normal rate and regular rhythm.      Heart sounds: Normal heart sounds. No murmur heard.  Pulmonary:      Effort: Pulmonary effort is normal. No respiratory distress.      Breath sounds: No stridor. Rales present. No wheezing.   Abdominal:      General: Bowel sounds are normal. There is no distension.      Palpations: Abdomen is soft.      Tenderness: There is no abdominal tenderness.   Musculoskeletal:         General: Normal range of motion.      Cervical back: Normal range of motion and neck supple.   Skin:     General: Skin is warm and dry.      Findings: No erythema.   Neurological:      Mental Status: He is alert and oriented to person, place, and time.      Gait: Gait is intact.   Psychiatric:         Mood and Affect: Mood and affect normal.         Cognition and Memory: Memory normal.         Judgment: Judgment normal.          Personal Diagnostic Review and Interpretation   CT scans " reviewed from the last 2 occurrences and do show in her lobular septal thick getting no evidence of clear UIP pattern at this time we will repeat in a few months      Pertinent Studies Reviewed & Interpreted:     Pulmonary Function Tests:   Pending     Echocardiograms:   Results for orders placed during the hospital encounter of 07/02/24    Echo    Interpretation Summary    Left Ventricle: The left ventricle is normal in size. Normal wall thickness. Regional wall motion abnormalities and Global hypokinesis present. See diagram for wall motion findings. There is mildly reduced systolic function with a visually estimated ejection fraction of 40 - 45%. Biplane (2D) method of discs ejection fraction is 43%. Global longitudinal strain is -13.0%.    Right Ventricle: Normal right ventricular cavity size. Wall thickness is normal. Systolic function is normal.    Aortic Valve: The aortic valve is a trileaflet valve. There is mild aortic valve sclerosis.    Mitral Valve: There is mild bileaflet sclerosis. There is mild mitral annular calcification present.    Pulmonic Valve: There is mild regurgitation.    Pulmonary Artery: The estimated pulmonary artery systolic pressure is 24 mmHg.    IVC/SVC: Normal venous pressure at 3 mmHg.          Assessment & Plan:       Problem List Items Addressed This Visit          Pulmonary    Interstitial lung disease exacerbation    Current Assessment & Plan     Had initially done well with starting CellCept however has been in and out of the hospital since then and required steroid tapers.  Was started on prednisone at home on Tuesday by his home health team but has continued to have worsening work of breathing shortness of breath and increased oxygen requirements.  Sending him to the ER he needs a full workup for respiratory infections:  Respiratory infection panel, sputum culture if able, flu and COVID test and RSV.    Could also consider PE workup given his tachycardia and his extreme work  of breathing.  He has previously responded well to steroids IV.  We will follow up when he gets out of the hospital            Chronic hypoxemic respiratory failure - Primary    Current Assessment & Plan     To ILD   Worsening respiratory distress and increased work of breathing  See above but sending to the ER             RETURN TO CLINIC IN 3 MONTHS       Portions of the record may have been created with voice-recognition software. Occasional wrong-word or sound-a-like substitutions may have occurred due to the inherent limitations of voice-recognition software. Read the chart carefully and recognize, using context, where substitutions have occurred.    Naheed Schmitt M.D.  Pulmonary/Critical Care

## 2024-12-12 NOTE — ASSESSMENT & PLAN NOTE
Had initially done well with starting CellCept however has been in and out of the hospital since then and required steroid tapers.  Was started on prednisone at home on Tuesday by his home health team but has continued to have worsening work of breathing shortness of breath and increased oxygen requirements.  Sending him to the ER he needs a full workup for respiratory infections:  Respiratory infection panel, sputum culture if able, flu and COVID test and RSV.    Could also consider PE workup given his tachycardia and his extreme work of breathing.  He has previously responded well to steroids IV.  We will follow up when he gets out of the hospital     
To ILD   Worsening respiratory distress and increased work of breathing  See above but sending to the ER  
verbal cues/nonverbal cues (demo/gestures)/1 person assist

## 2024-12-13 LAB
ADENOVIRUS: NOT DETECTED
ALBUMIN SERPL BCP-MCNC: 2.8 G/DL (ref 3.5–5.2)
ALP SERPL-CCNC: 53 U/L (ref 40–150)
ALT SERPL W/O P-5'-P-CCNC: 10 U/L (ref 10–44)
ANION GAP SERPL CALC-SCNC: 9 MMOL/L (ref 8–16)
AST SERPL-CCNC: 15 U/L (ref 10–40)
BASOPHILS # BLD AUTO: 0.02 K/UL (ref 0–0.2)
BASOPHILS NFR BLD: 0.4 % (ref 0–1.9)
BILIRUB SERPL-MCNC: 0.5 MG/DL (ref 0.1–1)
BORDETELLA PARAPERTUSSIS (IS1001): NOT DETECTED
BORDETELLA PERTUSSIS (PTXP): NOT DETECTED
BUN SERPL-MCNC: 8 MG/DL (ref 8–23)
CALCIUM SERPL-MCNC: 8.5 MG/DL (ref 8.7–10.5)
CHLAMYDIA PNEUMONIAE: NOT DETECTED
CHLORIDE SERPL-SCNC: 105 MMOL/L (ref 95–110)
CO2 SERPL-SCNC: 23 MMOL/L (ref 23–29)
CORONAVIRUS 229E, COMMON COLD VIRUS: NOT DETECTED
CORONAVIRUS HKU1, COMMON COLD VIRUS: NOT DETECTED
CORONAVIRUS NL63, COMMON COLD VIRUS: NOT DETECTED
CORONAVIRUS OC43, COMMON COLD VIRUS: NOT DETECTED
CREAT SERPL-MCNC: 0.8 MG/DL (ref 0.5–1.4)
DIFFERENTIAL METHOD BLD: ABNORMAL
EOSINOPHIL # BLD AUTO: 0.4 K/UL (ref 0–0.5)
EOSINOPHIL NFR BLD: 6.5 % (ref 0–8)
ERYTHROCYTE [DISTWIDTH] IN BLOOD BY AUTOMATED COUNT: 13.9 % (ref 11.5–14.5)
EST. GFR  (NO RACE VARIABLE): >60 ML/MIN/1.73 M^2
FLUBV RNA NPH QL NAA+NON-PROBE: NOT DETECTED
GLUCOSE SERPL-MCNC: 144 MG/DL (ref 70–110)
HCT VFR BLD AUTO: 35.8 % (ref 40–54)
HGB BLD-MCNC: 11.6 G/DL (ref 14–18)
HPIV1 RNA NPH QL NAA+NON-PROBE: NOT DETECTED
HPIV2 RNA NPH QL NAA+NON-PROBE: NOT DETECTED
HPIV3 RNA NPH QL NAA+NON-PROBE: NOT DETECTED
HPIV4 RNA NPH QL NAA+NON-PROBE: NOT DETECTED
HUMAN METAPNEUMOVIRUS: NOT DETECTED
IMM GRANULOCYTES # BLD AUTO: 0.01 K/UL (ref 0–0.04)
IMM GRANULOCYTES NFR BLD AUTO: 0.2 % (ref 0–0.5)
INFLUENZA A (SUBTYPES H1,H1-2009,H3): NOT DETECTED
INFLUENZA A, MOLECULAR: NEGATIVE
INFLUENZA B, MOLECULAR: NEGATIVE
LYMPHOCYTES # BLD AUTO: 1.3 K/UL (ref 1–4.8)
LYMPHOCYTES NFR BLD: 22.1 % (ref 18–48)
MAGNESIUM SERPL-MCNC: 1.9 MG/DL (ref 1.6–2.6)
MCH RBC QN AUTO: 27.8 PG (ref 27–31)
MCHC RBC AUTO-ENTMCNC: 32.4 G/DL (ref 32–36)
MCV RBC AUTO: 86 FL (ref 82–98)
MONOCYTES # BLD AUTO: 0.5 K/UL (ref 0.3–1)
MONOCYTES NFR BLD: 9.5 % (ref 4–15)
MYCOPLASMA PNEUMONIAE: NOT DETECTED
NEUTROPHILS # BLD AUTO: 3.5 K/UL (ref 1.8–7.7)
NEUTROPHILS NFR BLD: 61.3 % (ref 38–73)
NRBC BLD-RTO: 0 /100 WBC
OHS QRS DURATION: 84 MS
OHS QTC CALCULATION: 438 MS
PHOSPHATE SERPL-MCNC: 2.4 MG/DL (ref 2.7–4.5)
PLATELET # BLD AUTO: 184 K/UL (ref 150–450)
PMV BLD AUTO: 10.2 FL (ref 9.2–12.9)
POCT GLUCOSE: 114 MG/DL (ref 70–110)
POCT GLUCOSE: 192 MG/DL (ref 70–110)
POCT GLUCOSE: 267 MG/DL (ref 70–110)
POCT GLUCOSE: 337 MG/DL (ref 70–110)
POCT GLUCOSE: 351 MG/DL (ref 70–110)
POTASSIUM SERPL-SCNC: 3.2 MMOL/L (ref 3.5–5.1)
PROT SERPL-MCNC: 5.7 G/DL (ref 6–8.4)
RBC # BLD AUTO: 4.17 M/UL (ref 4.6–6.2)
RESPIRATORY INFECTION PANEL SOURCE: NORMAL
RSV RNA NPH QL NAA+NON-PROBE: NOT DETECTED
RV+EV RNA NPH QL NAA+NON-PROBE: NOT DETECTED
SARS-COV-2 RNA RESP QL NAA+PROBE: NOT DETECTED
SODIUM SERPL-SCNC: 137 MMOL/L (ref 136–145)
SPECIMEN SOURCE: NORMAL
T4 FREE SERPL-MCNC: 1.31 NG/DL (ref 0.71–1.51)
TSH SERPL DL<=0.005 MIU/L-ACNC: 0.03 UIU/ML (ref 0.4–4)
WBC # BLD AUTO: 5.66 K/UL (ref 3.9–12.7)

## 2024-12-13 PROCEDURE — 87205 SMEAR GRAM STAIN: CPT | Mod: HCNC | Performed by: PHYSICIAN ASSISTANT

## 2024-12-13 PROCEDURE — 25000003 PHARM REV CODE 250: Mod: HCNC

## 2024-12-13 PROCEDURE — 96375 TX/PRO/DX INJ NEW DRUG ADDON: CPT

## 2024-12-13 PROCEDURE — 94640 AIRWAY INHALATION TREATMENT: CPT | Mod: HCNC,XB

## 2024-12-13 PROCEDURE — 84100 ASSAY OF PHOSPHORUS: CPT | Mod: HCNC

## 2024-12-13 PROCEDURE — 96372 THER/PROPH/DIAG INJ SC/IM: CPT

## 2024-12-13 PROCEDURE — G0378 HOSPITAL OBSERVATION PER HR: HCPCS | Mod: HCNC

## 2024-12-13 PROCEDURE — 25000003 PHARM REV CODE 250: Mod: HCNC | Performed by: STUDENT IN AN ORGANIZED HEALTH CARE EDUCATION/TRAINING PROGRAM

## 2024-12-13 PROCEDURE — 36415 COLL VENOUS BLD VENIPUNCTURE: CPT | Mod: HCNC

## 2024-12-13 PROCEDURE — 63600175 PHARM REV CODE 636 W HCPCS: Mod: HCNC

## 2024-12-13 PROCEDURE — 80053 COMPREHEN METABOLIC PANEL: CPT | Mod: HCNC

## 2024-12-13 PROCEDURE — 99900035 HC TECH TIME PER 15 MIN (STAT): Mod: HCNC

## 2024-12-13 PROCEDURE — 87070 CULTURE OTHR SPECIMN AEROBIC: CPT | Mod: HCNC | Performed by: PHYSICIAN ASSISTANT

## 2024-12-13 PROCEDURE — 25000242 PHARM REV CODE 250 ALT 637 W/ HCPCS: Mod: HCNC

## 2024-12-13 PROCEDURE — 27000221 HC OXYGEN, UP TO 24 HOURS: Mod: HCNC

## 2024-12-13 PROCEDURE — 84443 ASSAY THYROID STIM HORMONE: CPT | Mod: HCNC

## 2024-12-13 PROCEDURE — 94640 AIRWAY INHALATION TREATMENT: CPT | Mod: HCNC

## 2024-12-13 PROCEDURE — 84439 ASSAY OF FREE THYROXINE: CPT | Mod: HCNC

## 2024-12-13 PROCEDURE — 96376 TX/PRO/DX INJ SAME DRUG ADON: CPT

## 2024-12-13 PROCEDURE — 94761 N-INVAS EAR/PLS OXIMETRY MLT: CPT | Mod: HCNC

## 2024-12-13 PROCEDURE — 99214 OFFICE O/P EST MOD 30 MIN: CPT | Mod: HCNC,GC,, | Performed by: INTERNAL MEDICINE

## 2024-12-13 PROCEDURE — 83735 ASSAY OF MAGNESIUM: CPT | Mod: HCNC

## 2024-12-13 PROCEDURE — 85025 COMPLETE CBC W/AUTO DIFF WBC: CPT | Mod: HCNC

## 2024-12-13 RX ORDER — IPRATROPIUM BROMIDE AND ALBUTEROL SULFATE 2.5; .5 MG/3ML; MG/3ML
3 SOLUTION RESPIRATORY (INHALATION)
Status: DISCONTINUED | OUTPATIENT
Start: 2024-12-13 | End: 2024-12-15

## 2024-12-13 RX ORDER — POTASSIUM CHLORIDE 20 MEQ/1
40 TABLET, EXTENDED RELEASE ORAL
Status: COMPLETED | OUTPATIENT
Start: 2024-12-13 | End: 2024-12-13

## 2024-12-13 RX ORDER — CARVEDILOL 25 MG/1
25 TABLET ORAL 2 TIMES DAILY WITH MEALS
COMMUNITY

## 2024-12-13 RX ORDER — CEFTRIAXONE 1 G/1
1 INJECTION, POWDER, FOR SOLUTION INTRAMUSCULAR; INTRAVENOUS
Status: DISCONTINUED | OUTPATIENT
Start: 2024-12-13 | End: 2024-12-15

## 2024-12-13 RX ORDER — METOPROLOL SUCCINATE 100 MG/1
100 TABLET, EXTENDED RELEASE ORAL DAILY
Status: DISCONTINUED | OUTPATIENT
Start: 2024-12-14 | End: 2024-12-16 | Stop reason: HOSPADM

## 2024-12-13 RX ORDER — MYCOPHENOLATE MOFETIL 250 MG/1
1000 CAPSULE ORAL 2 TIMES DAILY
Status: DISCONTINUED | OUTPATIENT
Start: 2024-12-13 | End: 2024-12-16 | Stop reason: HOSPADM

## 2024-12-13 RX ORDER — IPRATROPIUM BROMIDE AND ALBUTEROL SULFATE 2.5; .5 MG/3ML; MG/3ML
3 SOLUTION RESPIRATORY (INHALATION)
Status: DISCONTINUED | OUTPATIENT
Start: 2024-12-13 | End: 2024-12-13

## 2024-12-13 RX ORDER — POTASSIUM CHLORIDE 20 MEQ/1
40 TABLET, EXTENDED RELEASE ORAL ONCE
Status: COMPLETED | OUTPATIENT
Start: 2024-12-13 | End: 2024-12-13

## 2024-12-13 RX ORDER — OMEPRAZOLE 40 MG/1
40 CAPSULE, DELAYED RELEASE ORAL DAILY
COMMUNITY

## 2024-12-13 RX ORDER — ALBUTEROL SULFATE 90 UG/1
2 INHALANT RESPIRATORY (INHALATION) EVERY 6 HOURS PRN
Status: DISCONTINUED | OUTPATIENT
Start: 2024-12-13 | End: 2024-12-16 | Stop reason: HOSPADM

## 2024-12-13 RX ORDER — ASPIRIN 81 MG/1
81 TABLET ORAL DAILY
Status: DISCONTINUED | OUTPATIENT
Start: 2024-12-13 | End: 2024-12-16 | Stop reason: HOSPADM

## 2024-12-13 RX ORDER — AZITHROMYCIN 250 MG/1
500 TABLET, FILM COATED ORAL DAILY
Status: DISCONTINUED | OUTPATIENT
Start: 2024-12-14 | End: 2024-12-15

## 2024-12-13 RX ORDER — SODIUM,POTASSIUM PHOSPHATES 280-250MG
1 POWDER IN PACKET (EA) ORAL ONCE
Status: COMPLETED | OUTPATIENT
Start: 2024-12-13 | End: 2024-12-13

## 2024-12-13 RX ADMIN — ATORVASTATIN CALCIUM 40 MG: 40 TABLET, FILM COATED ORAL at 10:12

## 2024-12-13 RX ADMIN — FUROSEMIDE 20 MG: 20 TABLET ORAL at 08:12

## 2024-12-13 RX ADMIN — SACUBITRIL AND VALSARTAN 1 TABLET: 24; 26 TABLET, FILM COATED ORAL at 08:12

## 2024-12-13 RX ADMIN — INSULIN ASPART 5 UNITS: 100 INJECTION, SOLUTION INTRAVENOUS; SUBCUTANEOUS at 04:12

## 2024-12-13 RX ADMIN — AMLODIPINE BESYLATE 10 MG: 10 TABLET ORAL at 08:12

## 2024-12-13 RX ADMIN — CEFTRIAXONE SODIUM 1 G: 1 INJECTION, POWDER, FOR SOLUTION INTRAMUSCULAR; INTRAVENOUS at 04:12

## 2024-12-13 RX ADMIN — POTASSIUM CHLORIDE 40 MEQ: 1500 TABLET, EXTENDED RELEASE ORAL at 09:12

## 2024-12-13 RX ADMIN — TAMSULOSIN HYDROCHLORIDE 0.4 MG: 0.4 CAPSULE ORAL at 08:12

## 2024-12-13 RX ADMIN — ISOSORBIDE MONONITRATE 30 MG: 30 TABLET, EXTENDED RELEASE ORAL at 08:12

## 2024-12-13 RX ADMIN — SALMETEROL XINAFOATE 1 PUFF: 50 POWDER, METERED ORAL; RESPIRATORY (INHALATION) at 08:12

## 2024-12-13 RX ADMIN — FAMOTIDINE 40 MG: 20 TABLET ORAL at 08:12

## 2024-12-13 RX ADMIN — ENOXAPARIN SODIUM 40 MG: 40 INJECTION SUBCUTANEOUS at 05:12

## 2024-12-13 RX ADMIN — BUDESONIDE 1 MG: 0.5 INHALANT RESPIRATORY (INHALATION) at 08:12

## 2024-12-13 RX ADMIN — SACUBITRIL AND VALSARTAN 1 TABLET: 24; 26 TABLET, FILM COATED ORAL at 10:12

## 2024-12-13 RX ADMIN — POTASSIUM & SODIUM PHOSPHATES POWDER PACK 280-160-250 MG 1 PACKET: 280-160-250 PACK at 08:12

## 2024-12-13 RX ADMIN — POTASSIUM CHLORIDE 40 MEQ: 1500 TABLET, EXTENDED RELEASE ORAL at 01:12

## 2024-12-13 RX ADMIN — IPRATROPIUM BROMIDE AND ALBUTEROL SULFATE 3 ML: 2.5; .5 SOLUTION RESPIRATORY (INHALATION) at 05:12

## 2024-12-13 RX ADMIN — ENOXAPARIN SODIUM 40 MG: 40 INJECTION SUBCUTANEOUS at 04:12

## 2024-12-13 RX ADMIN — MYCOPHENOLATE MOFETIL 1000 MG: 250 CAPSULE ORAL at 08:12

## 2024-12-13 RX ADMIN — INSULIN ASPART 4 UNITS: 100 INJECTION, SOLUTION INTRAVENOUS; SUBCUTANEOUS at 11:12

## 2024-12-13 RX ADMIN — ASPIRIN 81 MG: 81 TABLET, COATED ORAL at 11:12

## 2024-12-13 RX ADMIN — BENZONATATE 100 MG: 100 CAPSULE ORAL at 10:12

## 2024-12-13 RX ADMIN — METOPROLOL SUCCINATE 50 MG: 50 TABLET, EXTENDED RELEASE ORAL at 08:12

## 2024-12-13 RX ADMIN — POTASSIUM CHLORIDE 40 MEQ: 1500 TABLET, EXTENDED RELEASE ORAL at 08:12

## 2024-12-13 RX ADMIN — MYCOPHENOLATE MOFETIL 1000 MG: 250 CAPSULE ORAL at 10:12

## 2024-12-13 RX ADMIN — FAMOTIDINE 40 MG: 20 TABLET ORAL at 10:12

## 2024-12-13 RX ADMIN — METHYLPREDNISOLONE SODIUM SUCCINATE 40 MG: 40 INJECTION, POWDER, FOR SOLUTION INTRAMUSCULAR; INTRAVENOUS at 08:12

## 2024-12-13 RX ADMIN — METHYLPREDNISOLONE SODIUM SUCCINATE 40 MG: 40 INJECTION, POWDER, FOR SOLUTION INTRAMUSCULAR; INTRAVENOUS at 10:12

## 2024-12-13 NOTE — PLAN OF CARE
Problem: Adult Inpatient Plan of Care  Goal: Plan of Care Review  Outcome: Progressing  Goal: Patient-Specific Goal (Individualized)  Outcome: Progressing     Problem: Diabetes Comorbidity  Goal: Blood Glucose Level Within Targeted Range  Outcome: Progressing

## 2024-12-13 NOTE — ASSESSMENT & PLAN NOTE
73y/o M w/hx of ILD (on Cellcept), chronic hypoxic respiratory failure (on 2LNC), HTN, T2DM, HLD, GERD, BPH who presents to the hospital for increased WOB and SOB from Pulm clinic. Tachypneic and hypoxic in the ED, initially requiring additional oxygen. COVID/Flu and Respiratory panel negative. VBG 7.38/42.4/20. CTA negative for PE but with honeycombing, traction bronchiectasis, and subpleural reticulations consistent with interstitial lung disease. Physical examination with increase WOB and clinically uncomfortable on exam. CT imaging from 12/12/2024 and 9/25/2024 with no significant differences. Admitted to the hospital for AHRF concerning for ILD exacerbation.     Recommendations:   - Start empiric CAP coverage  - Duonebs (patient typically uses them twice daily, consider scheduling them while patient is awake)  - Increasing methylpred 40 mg from qd to BID

## 2024-12-13 NOTE — ED NOTES
Patient identifiers verified and correct for [facility]  LOC: The patient is aao x4  APPEARANCE: Patient appears comfortable and in no acute distress, patient is clean and well groomed.  SKIN: The skin is warm and dry, color consistent with ethnicity, patient has normal skin turgor and moist mucus membranes, skin intact  MUSCULOSKELETAL: Patient moving all extremities spontaneously, no swelling noted.  RESPIRATORY: Airway is open and patent, respirations are spontaneous, patient has a normal effort and rate, no accessory muscle use noted, pt placed on continuous pulse ox with O2 sats noted at 98% on 2L NC. +SOB  CARDIAC: Pt placed on cardiac monitor. Patient has a tachy rate, no edema noted, capillary refill < 3 seconds.   GASTRO: Soft and non tender to palpation, no distention noted  : Pt denies any pain or frequency with urination.  NEURO: Pt opens eyes spontaneously, behavior appropriate to situation, follows commands, facial expression symmetrical, bilateral hand grasp equal and even, purposeful motor response noted, normal sensation in all extremities when touched with a finger.

## 2024-12-13 NOTE — ASSESSMENT & PLAN NOTE
74yoM w/hx of ILD (on Cellcept), chronic hypoxic respiratory failure (on 2LNC), HTN, T2DM, HLD, GERD, BPH who presents to the hospital for increased WOB and SOB from Pulm clinic. Tachypneic and hypoxic in the ED, initially requiring additional oxygen. COVID/Flu negative. Respiratory panel negative. VBG 7.38/42.4/20. CTA negative for PE but with honeycombing, traction bronchiectasis, and subpleural reticulations consistent with interstitial lung disease. Admitted to the hospital for AHRF concerning for ILD exacerbation.     While patient does not have new bilateral opacities or other findings on imaging suggestive of ILD exacerbation, he does have increased WOB and clinically uncomfortable on exam. Possibly progression of his ILD. Superimposed lung infection and pulmonary edema were considered but less likely due to no signs of infection and only slight elevation in BNP but no other signs of volume overload. I believe its reasonable to treat patient for ILD exacerbation. Has improved with IV steroids in the past so will start while admitted.       Plan:   - Started IV Solumedrol 40mg daily. Taper when appropriate  - Continue daily nebulizers and inhalers  - Continue Cellcept as no sign of infection at this time   - PRN Tessalon pearls for cough  - F/u Bcx   - Consider diuresis if patient is not responsive to steroids.   - Supplemental O2

## 2024-12-13 NOTE — PROGRESS NOTES
Patient arrived to the unit. Telemetry box on and vital signs documented in flow sheet. IV 20 jesica maintained. Identification band on patient. Pt oriented to room, plan of care reviewed, and admission completed. Call light and belongings within reach. Pt with no concerns at this time. Bed in low and locked position.    Skin assessed during: Admit      [] No Altered Skin Integrity Present    [x]Prevention Measures Documented      [] Yes- Altered Skin Integrity Present or Discovered   [] LDA Added if Not in Epic (Describe Wound)   [] New Altered Skin Integrity was Present on Admit and Documented in LDA   [] Wound Image Taken  [] Already in LDA    Wound Care Consulted? No    Attending Nurse:  Gina Schofield RN     Second RN/Staff Member:  KANDI Solis

## 2024-12-13 NOTE — SUBJECTIVE & OBJECTIVE
"Past Medical History:   Diagnosis Date    CAD (coronary artery disease)     Sees [facility], h/o stent    Diabetes mellitus     Hypertension     Kidney stone     Stroke     age 62       Past Surgical History:   Procedure Laterality Date    24 HOUR IMPEDANCE PH MONITORING OF ESOPHAGUS IN PATIENT NOT TAKING ACID REDUCING MEDICATIONS N/A 11/13/2024    Procedure: IMPEDANCE PH STUDY, ESOPHAGEAL, 24 HOUR, IN PATIENT NOT TAKING ACID REDUCING MEDICATION;  Surgeon: Stephany Mendoza MD;  Location: [facility] (4TH FLR);  Service: Endoscopy;  Laterality: N/A;  referral dr miguel/ off ppi / prep inst sent to pt via mail  11/6- precall attempted no answer. Unable to [facility]-  11/8 - pt not called, per chart review patient currently inpatient at [place] [id number]    CARDIAC CATHETERIZATION      with stent    COLONOSCOPY N/A 03/27/2024    Procedure: COLONOSCOPY;  Surgeon: Ariela Castro MD;  Location: [facility] ENDO;  Service: Endoscopy;  Laterality: N/A;    ESOPHAGEAL MANOMETRY WITH MEASUREMENT OF IMPEDANCE N/A 11/13/2024    Procedure: MANOMETRY, ESOPHAGUS, WITH IMPEDANCE MEASUREMENT;  Surgeon: Stephany Mendoza MD;  Location: [facility] (4TH FLR);  Service: Endoscopy;  Laterality: N/A;    ESOPHAGOGASTRODUODENOSCOPY N/A 03/27/2024    Procedure: EGD (ESOPHAGOGASTRODUODENOSCOPY);  Surgeon: Ariela Castro MD;  Location: [facility] ENDO;  Service: Endoscopy;  Laterality: N/A;    LEFT HEART CATHETERIZATION Left 9/10/2024    Procedure: Left heart cath;  Surgeon: Jemal Drummond MD;  Location: [facility] CATH LAB;  Service: Cardiology;  Laterality: Left;    SHOULDER SURGERY Right        Review of patient's allergies indicates:   Allergen Reactions    Dapagliflozin      Other reaction(s): Other (See Comments)    Pcn [penicillins]     Linagliptin Other (See Comments)     "it knocked me down", "it almost killed me"    Lisinopril Other (See Comments)     cough    Pantoprazole Hives       No current facility-administered medications on file prior to " encounter.     Current Outpatient Medications on File Prior to Encounter   Medication Sig    albuterol-ipratropium (DUO-NEB) 2.5 mg-0.5 mg/3 mL nebulizer solution Take 3 mLs by nebulization every 6 (six) hours as needed for Wheezing. Rescue    amLODIPine (NORVASC) 10 MG tablet Take 1 tablet (10 mg total) by mouth once daily.    arformoteroL (BROVANA) 15 mcg/2 mL Nebu Take 2 mLs (15 mcg total) by nebulization 2 (two) times daily. Controller    atorvastatin (LIPITOR) 40 MG tablet Take 1 tablet (40 mg total) by mouth every evening.    budesonide (PULMICORT) 0.5 mg/2 mL nebulizer solution Take 4 mLs (1 mg total) by nebulization 2 (two) times daily. Controller    famotidine (PEPCID) 40 MG tablet Take 1 tablet (40 mg total) by mouth 2 (two) times daily.    furosemide (LASIX) 20 MG tablet Take 1 tablet (20 mg total) by mouth once daily.    glimepiride (AMARYL) 2 MG tablet Take 1 tablet (2 mg total) by mouth 2 (two) times a day. Take with meals    metoprolol succinate (TOPROL-XL) 50 MG 24 hr tablet Take 1 tablet (50 mg total) by mouth once daily.    mycophenolate (CELLCEPT) 250 mg Cap Take 4 capsules (1,000 mg total) by mouth 2 (two) times daily.    alpha-D-galactosidase (BEANO ORAL) Take 1 tablet by mouth once daily.    aspirin 81 MG Chew Take 81 mg by mouth once daily.    blood-glucose sensor (FREESTYLE RAMBO 3 PLUS SENSOR) Blanche Change every 15 days    cholecalciferol, vitamin D3, (VITAMIN D3) 50 mcg (2,000 unit) Cap capsule Take 1 capsule by mouth once daily.    fluticasone-salmeterol diskus inhaler 250-50 mcg Inhale 1 puff into the lungs 2 (two) times daily. Controller    isosorbide mononitrate (IMDUR) 30 MG 24 hr tablet Take 1 tablet (30 mg total) by mouth once daily.    metFORMIN (GLUCOPHAGE) 1000 MG tablet Take 1 tablet (1,000 mg total) by mouth 2 (two) times daily with meals.    mirabegron (MYRBETRIQ) 25 mg Tb24 ER tablet Take 1 tablet (25 mg total) by mouth once daily.    nitroGLYCERIN (NITROSTAT) 0.3 MG SL tablet  Place 1 tablet (0.3 mg total) under the tongue every 5 (five) minutes as needed for Chest pain.    predniSONE (DELTASONE) 10 MG tablet Take 1 tablet (10 mg total) by mouth once daily. for 7 days    sacubitriL-valsartan (ENTRESTO) 24-26 mg per tablet Take 1 tablet by mouth 2 (two) times daily.    sucralfate (CARAFATE) 1 gram tablet Take 1 tablet (1 g total) by mouth 4 (four) times daily before meals and nightly.    tamsulosin (FLOMAX) 0.4 mg Cap Take 1 capsule (0.4 mg total) by mouth once daily.     Family History       Problem Relation (Age of Onset)    Cancer Mother    Colon cancer Sister          Tobacco Use    Smoking status: Never     Passive exposure: Never    Smokeless tobacco: Never   Substance and Sexual Activity    Alcohol use: No    Drug use: Never    Sexual activity: Not Currently     Review of Systems   Constitutional:  Negative for chills, fatigue and unexpected weight change.   HENT:  Negative for congestion, sinus pain and sore throat.    Eyes:  Negative for redness and visual disturbance.   Respiratory:  Positive for cough and shortness of breath. Negative for chest tightness.    Cardiovascular:  Negative for chest pain, palpitations and leg swelling.   Gastrointestinal:  Negative for abdominal pain, diarrhea, nausea and vomiting.   Musculoskeletal:  Negative for arthralgias, back pain and joint swelling.   Neurological:  Negative for weakness, light-headedness and headaches.     Objective:     Vital Signs (Most Recent):  Temp: 97.7 °F (36.5 °C) (12/12/24 2232)  Pulse: 85 (12/12/24 2232)  Resp: 20 (12/12/24 2232)  BP: (!) 156/89 (12/12/24 2232)  SpO2: 98 % (12/12/24 2232) Vital Signs (24h Range):  Temp:  [97.6 °F (36.4 °C)-98.6 °F (37 °C)] 97.7 °F (36.5 °C)  Pulse:  [] 85  Resp:  [16-20] 20  SpO2:  [92 %-98 %] 98 %  BP: (136-156)/(79-97) 156/89     Weight: 63.5 kg (140 lb)  Body mass index is 21.29 kg/m².     Physical Exam  Constitutional:       General: He is not in acute distress.      Appearance: Normal appearance. He is ill-appearing.   HENT:      Head: Normocephalic.      Right Ear: External ear normal.      Left Ear: External ear normal.      Mouth/Throat:      Mouth: Mucous membranes are moist.      Pharynx: Oropharynx is clear.   Eyes:      General: No scleral icterus.     Extraocular Movements: Extraocular movements intact.   Cardiovascular:      Rate and Rhythm: Normal rate and regular rhythm.      Pulses: Normal pulses.      Heart sounds: Normal heart sounds. No murmur heard.     No friction rub. No gallop.   Pulmonary:      Effort: Pulmonary effort is normal. No respiratory distress.      Breath sounds: No stridor. Examination of the right-upper field reveals rales. Examination of the left-upper field reveals rales. Examination of the right-lower field reveals rales. Examination of the left-lower field reveals rales. Rales present. No wheezing.      Comments: Crackles auscultated throughout lobes. Trouble breathing while talking, requiring extra breaths after every couple of words  Chest:      Chest wall: No tenderness.   Abdominal:      General: Abdomen is flat. Bowel sounds are normal. There is no distension.      Palpations: Abdomen is soft.      Tenderness: There is no abdominal tenderness.   Musculoskeletal:         General: No swelling.      Right lower leg: No edema.      Left lower leg: No edema.   Skin:     General: Skin is warm.      Coloration: Skin is not jaundiced.      Findings: No erythema or rash.   Neurological:      General: No focal deficit present.      Mental Status: He is alert and oriented to person, place, and time. Mental status is at baseline.   Psychiatric:         Mood and Affect: Mood normal.         Behavior: Behavior normal.         Thought Content: Thought content normal.                Significant Labs: All pertinent labs within the past 24 hours have been reviewed.    Significant Imaging: I have reviewed all pertinent imaging results/findings within the  past 24 hours.

## 2024-12-13 NOTE — ASSESSMENT & PLAN NOTE
Last Hb A1c   Lab Results   Component Value Date    HGBA1C 8.0 (H) 09/25/2024     Home regimen: Metformin and Glimepride     - Hold oral anti-glycemics while hospitalized  - low dose SSI  - Accu-checks q AC/HS  - Goal glucose 140-180  - Titrate insulin regimen as needed. Use caution with patients with poor kidney function as they will have decreased insulin clearance and can precipitate hypoglycemia.  - Diabetic diet as tolerated  - Hypoglycemia protocol in place  - If blood glucose greater than 300, please ask patient not to eat food or drink anything other than water until correctional insulin has brought it back below 250  - If patient is NPO, please hold pre-meal Aspart (Daily Detemir/basal insulin is ok to give even if patient is NPO)

## 2024-12-13 NOTE — PHARMACY MED REC
"Admission Medication History     The home medication history was taken by Sophie Lundberg.    You may go to "Admission" then "Reconcile Home Medications" tabs to review and/or act upon these items.     The home medication list has been updated by the Pharmacy department.   Please read ALL comments highlighted in yellow.   Please address this information as you see fit.    Feel free to contact us if you have any questions or require assistance.      The medications listed below were removed from the home medication list. Please reorder if appropriate:  Patient reports no longer taking the following medication(s):  Beano Oral  Fluticasone-salmeterol diskus inhaler        Medications listed below were obtained from: Patient/family and Analytic software- [employer] Medications   Medication Sig    albuterol-ipratropium (DUO-NEB) 2.5 mg-0.5 mg/3 mL nebulizer solution Take 3 Mls By Nebulization Every 6 (Six) Hours As Needed For Wheezing.       amLODIPine (NORVASC) 10 MG tablet Take 1 Tablet (10 Mg Total) By Mouth Once Daily.      arformoteroL (BROVANA) 15 mcg/2 mL Nebu Take 2 Mls (15 Mcg Total) By Nebulization 2 (Two) Times Daily.      aspirin 81 MG Chew Take 1 Tablet (81 Mg Total) By Mouth Once Daily.      atorvastatin (LIPITOR) 40 MG tablet Take 1 Tablet (40 Mg Total) By Mouth Every Evening.      budesonide (PULMICORT) 0.5 mg/2 mL nebulizer solution Take 4 Mls (1 Mg Total) By Nebulization 2 (Two) Times Daily.       carvediloL (COREG) 25 MG tablet Take 1 Tablet (25 Mg Total ) By Mouth 2 (Two) Times Daily With Meals.      cholecalciferol, vitamin D3, (VITAMIN D3) 50 mcg (2,000 unit) Cap capsule Take 1 Capsule (50 Mcg Total) By Mouth Once Daily.      famotidine (PEPCID) 40 MG tablet Take 1 Tablet (40 Mg Total) By Mouth 2 (Two) Times Daily.      furosemide (LASIX) 20 MG tablet Take 1 Tablet (20 Mg Total) By Mouth Once Daily.      glimepiride (AMARYL) 2 MG tablet Take 1 Tablet (2 Mg Total) By Mouth 2 (Two) Times A Day With " Meals      metFORMIN (GLUCOPHAGE) 1000 MG tablet Take 1 Tablet (1,000 Mg Total) By Mouth 2 (Two) Times Daily With Meals.      metoprolol succinate (TOPROL-XL) 50 MG 24 hr tablet Take 1 Tablet (50 Mg Total) By Mouth Once Daily.  .    mirabegron (MYRBETRIQ) 25 mg Tb24 ER tablet Take 1 Tablet (25 Mg Total) By Mouth Once Daily.      mycophenolate (CELLCEPT) 250 mg Cap Take 4 Capsules (1,000 Mg Total) By Mouth 2 (Two) Times Daily.      nitroGLYCERIN (NITROSTAT) 0.3 MG SL tablet Place 1 Tablet (0.3 Mg Total) Under The Tongue Every 5 (Five) Minutes As Needed For Chest Pain.      omeprazole (PRILOSEC) 40 MG capsule Take 1 Tablet (40 Mg Total ) By Mouth Once Daily.      predniSONE (DELTASONE) 10 MG tablet Take 1 Tablet (10 Mg Total) By Mouth Once Daily       sacubitriL-valsartan (ENTRESTO) 24-26 mg per tablet Take 1 Tablet (24- 26 Mg Total) By Mouth 2 (Two) Times Daily.      sucralfate (CARAFATE) 1 gram tablet Take 1 Tablet (1 G Total) By Mouth 4 (Four) Times Daily Before Meals And Nightly.         Sophie Lundberg  [id number]                  .

## 2024-12-13 NOTE — ASSESSMENT & PLAN NOTE
74yoM w/hx of ILD (on Cellcept), chronic hypoxic respiratory failure (on 2LNC), HTN, T2DM, HLD, GERD, BPH who presents to the hospital for increased WOB and SOB from Pulm clinic. Tachypneic and hypoxic in the ED, initially requiring additional oxygen. COVID/Flu negative. Respiratory panel negative. VBG 7.38/42.4/20. CTA negative for PE but with honeycombing, traction bronchiectasis, and subpleural reticulations consistent with interstitial lung disease. Admitted to the hospital for AHRF concerning for ILD exacerbation.     While patient does not have new bilateral opacities or other findings on imaging suggestive of ILD exacerbation, he does have increased WOB and clinically uncomfortable on exam. Possibly progression of his ILD. Superimposed lung infection and pulmonary edema were considered but less likely due to no signs of infection and only slight elevation in BNP but no other signs of volume overload. I believe its reasonable to treat patient for ILD exacerbation. Has improved with IV steroids in the past so will start while admitted.       Plan:   -Pulmonology consulted, f/u recs  - increased IV Solumedrol 40mg BID per pulm recs  -Started empiric CAP cvg plus scheduled duo nebs per Pulm  - Continue daily nebulizers and inhalers  - Continue Cellcept as no sign of infection at this time. Monitor for GI symptoms  - PRN Tessalon pearls for cough  - F/u Bcx   - Consider diuresis if patient is not responsive to steroids.   - Supplemental O2

## 2024-12-13 NOTE — ED PROVIDER NOTES
"Encounter Date: 12/12/2024       History     Chief Complaint   Patient presents with    Shortness of Breath     Cp/sob while @ pulmonary appointment, arrives on nc o2 speaks in full sentences.     HPI    75 y/o M PMH ILD, on home O2 who presents to the ED for evaluation of dyspnea.  Patient sent from pulm clinic, noted to be tachypneic, urged to come in for eval.  Patient reports worsening dyspnea with exertion recently.  Progressive symptoms since diagnosis.  Denies any f/c, syncope, abd pain, N/V/D, or trauma.      Review of patient's allergies indicates:   Allergen Reactions    Dapagliflozin      Other reaction(s): Other (See Comments)    Pcn [penicillins]     Linagliptin Other (See Comments)     "it knocked me down", "it almost killed me"    Lisinopril Other (See Comments)     cough    Pantoprazole Hives     Past Medical History:   Diagnosis Date    CAD (coronary artery disease)     Sees [facility], h/o stent    Diabetes mellitus     Hypertension     Kidney stone     Stroke     age 62     Past Surgical History:   Procedure Laterality Date    24 HOUR IMPEDANCE PH MONITORING OF ESOPHAGUS IN PATIENT NOT TAKING ACID REDUCING MEDICATIONS N/A 11/13/2024    Procedure: IMPEDANCE PH STUDY, ESOPHAGEAL, 24 HOUR, IN PATIENT NOT TAKING ACID REDUCING MEDICATION;  Surgeon: Stephany Mendoza MD;  [facility] ([address] [place] [address]);  Service: Endoscopy;  Laterality: N/A;  referral dr miguel/ off ppi / prep inst sent to pt via mail  11/6- precall attempted no answer. Unable to [facility]-  11/8 - pt not called, per chart review patient currently inpatient at [place] [address]    CARDIAC CATHETERIZATION      with stent    COLONOSCOPY N/A 03/27/2024    Procedure: COLONOSCOPY;  Surgeon: Ariela Castro MD;  Location: [facility];  Service: Endoscopy;  Laterality: N/A;    ESOPHAGEAL MANOMETRY WITH MEASUREMENT OF IMPEDANCE N/A 11/13/2024    Procedure: MANOMETRY, ESOPHAGUS, WITH IMPEDANCE MEASUREMENT;  Surgeon: Stephany Mendoza MD;  Location: [facility]" ENDO (4TH FLR);  Service: Endoscopy;  Laterality: N/A;    ESOPHAGOGASTRODUODENOSCOPY N/A 03/27/2024    Procedure: EGD (ESOPHAGOGASTRODUODENOSCOPY);  Surgeon: Ariela Castro MD;  Location: [facility] ENDO;  Service: Endoscopy;  Laterality: N/A;    LEFT HEART CATHETERIZATION Left 9/10/2024    Procedure: Left heart cath;  Surgeon: Jemal Drummond MD;  Location: [facility] CATH LAB;  Service: Cardiology;  Laterality: Left;    SHOULDER SURGERY Right      Family History   Problem Relation Name Age of Onset    Cancer Mother      Colon cancer Sister      Esophageal cancer Neg Hx       Social History     Tobacco Use    Smoking status: Never     Passive exposure: Never    Smokeless tobacco: Never   Substance Use Topics    Alcohol use: No    Drug use: Never     Review of Systems   Respiratory:  Positive for shortness of breath.        Physical Exam     Initial Vitals [12/12/24 1625]   BP Pulse Resp Temp SpO2   136/86 (!) 122 16 98.6 °F (37 °C) 98 %      MAP       --         Physical Exam    Nursing note and vitals reviewed.  Constitutional: He appears well-nourished.   HENT:   Head: Atraumatic.   Eyes: EOM are normal.   Neck: Neck supple.   Cardiovascular:  Normal rate and regular rhythm.           Pulmonary/Chest:   Tachypneic, on home O2   Abdominal: Abdomen is soft. There is no abdominal tenderness.   Musculoskeletal:         General: No edema. Normal range of motion.      Cervical back: Neck supple.     Neurological: He is alert and oriented to person, place, and time.   Skin: Skin is warm and dry.   Psychiatric: He has a normal mood and affect. Thought content normal.         ED Course   Procedures  Labs Reviewed   CBC W/ AUTO DIFFERENTIAL - Abnormal       Result Value    WBC 6.56      RBC 4.58 (*)     Hemoglobin 13.1 (*)     Hematocrit 37.8 (*)     MCV 83      MCH 28.6      MCHC 34.7      RDW 13.8      Platelets 209      MPV 9.9      Immature Granulocytes 0.2      Gran # (ANC) 4.8      Immature Grans (Abs) 0.01      Lymph  # 0.9 (*)     Mono # 0.6      Eos # 0.3      Baso # 0.02      nRBC 0      Gran % 73.1 (*)     Lymph % 14.2 (*)     Mono % 8.4      Eosinophil % 3.8      Basophil % 0.3      Differential Method Automated     COMPREHENSIVE METABOLIC PANEL - Abnormal    Sodium 138      Potassium 3.2 (*)     Chloride 104      CO2 24      Glucose 70      BUN 7 (*)     Creatinine 0.7      Calcium 9.0      Total Protein 6.6      Albumin 3.2 (*)     Total Bilirubin 0.6      Alkaline Phosphatase 57      AST 17      ALT 10      eGFR >60.0      Anion Gap 10     MAGNESIUM - Abnormal    Magnesium 1.5 (*)     Narrative:     Add on MG per Dr. Awan @ 18:15 pm to order # [id number]   B-TYPE NATRIURETIC PEPTIDE - Abnormal     (*)     Narrative:     Add on BNP to #[id number] per Ousmane Awan MD on  12/12/2024  20:38     Add on MG per Dr. Awan @ 18:15 pm to order # [id number]   ISTAT PROCEDURE - Abnormal    POC PH 7.380      POC PCO2 42.4      POC PO2 20 (*)     POC HCO3 25.1      POC BE 0      POC SATURATED O2 31      POC TCO2 26      Sample VENOUS      Site Other      Allens Test N/A     INFLUENZA A & B BY MOLECULAR    Flu A & B Source NP     RESPIRATORY INFECTION PANEL (PCR), NASOPHARYNGEAL    Respiratory Infection Panel Source NP Swab      Adenovirus Not Detected      Coronavirus 229E, Common Cold Virus Not Detected      Coronavirus HKU1, Common Cold Virus Not Detected      Coronavirus NL63, Common Cold Virus Not Detected      Coronavirus OC43, Common Cold Virus Not Detected      SARS-CoV2 (COVID-19) Qualitative PCR Not Detected      Human Metapneumovirus Not Detected      Human Rhinovirus/Enterovirus Not Detected      Influenza A (subtypes H1, H1-2009,H3) Not Detected      Influenza B Not Detected      Parainfluenza Virus 1 Not Detected      Parainfluenza Virus 2 Not Detected      Parainfluenza Virus 3 Not Detected      Parainfluenza Virus 4 Not Detected      Respiratory Syncytial Virus Not Detected      Bordetella Parapertussis  (EE9945) Not Detected      Bordetella pertussis (ptxP) Not Detected      Chlamydia pneumoniae Not Detected      Mycoplasma pneumoniae Not Detected      Narrative:     Assay not valid for lower respiratory specimens, alternate  testing required.   RESPIRATORY INFECTION PANEL (PCR), NASOPHARYNGEAL    Respiratory Infection Panel Source NP Swab      Narrative:     Assay not valid for lower respiratory specimens, alternate  testing required.   CULTURE, RESPIRATORY   CULTURE, BLOOD   CULTURE, BLOOD   TROPONIN I HIGH SENSITIVITY    Troponin I High Sensitivity 8      Narrative:     Add on MG per Dr. Awan @ 18:15 pm to order # [id number]   SARS-COV-2 RNA AMPLIFICATION, QUAL    SARS-CoV-2 RNA, Amplification, Qual Negative     B-TYPE NATRIURETIC PEPTIDE   MAGNESIUM   TROPONIN I HIGH SENSITIVITY    Troponin I High Sensitivity 7     ISTAT LACTATE    POC Lactate 0.79      Sample VENOUS      Site Other      Allens Test N/A     POCT GLUCOSE    POCT Glucose 74     POCT GLUCOSE MONITORING CONTINUOUS          Imaging Results              CTA Chest Non-Coronary (PE Studies) (Final result)  Result time 12/12/24 21:13:27      Final result by Benji Gagnon MD (12/12/24 21:13:27)                   Impression:      1. No pulmonary embolism to the segmental level.  2. Similar findings including honeycombing, traction bronchiectasis, and subpleural reticulations consistent with interstitial lung disease.  3. Splenomegaly.  4. Enlarged, incompletely visualized, and diffusely hypoattenuating (likely hypofunctioning) thyroid gland.  Correlate clinically for suspected hypothyroidism.  5. Additional findings, as detailed in the body of the report.    Electronically signed by resident: Anna Jones  Date:    12/12/2024  Time:    20:21    Electronically signed by: Benji Gagnon  Date:    12/12/2024  Time:    21:13               Narrative:    EXAMINATION:  CTA CHEST NON CORONARY (PE STUDIES)    CLINICAL HISTORY:  Pulmonary embolism (PE)  suspected, high prob;    TECHNIQUE:  Low dose axial images, sagittal and coronal reformations were obtained from the thoracic inlet to the lung bases following the IV administration of 75 mL of Omnipaque-350.  Scan technique was optimized to evaluate the pulmonary arteries.  MIP images were performed.    COMPARISON:  Chest radiograph 12/12/2024 and CTA chest 09/25/2024.    FINDINGS:  No pulmonary artery filling defects to the segmental level.  The main pulmonary artery is normal in diameter.    Similar appearance of extensive honeycombing, traction bronchiectasis, and subpleural reticulations within the bilateral lung fields with lower lung zone predominance.  Mosaic pattern of the underlying the lung parenchyma, unchanged compared to prior.  Biapical fibronodular scarring.  Innumerable pulmonary micro nodules throughout the bilateral lung fields, overall similar in size and distribution compared to the prior CTA from 09/25/2024.  No pleural effusion or pneumothorax.    Heart size is normal.  No pericardial effusion.  Multi-vessel calcific atherosclerosis of the coronary arteries, possibly with an LAD stent.  The thoracic aorta is suboptimally enhanced on these pulmonary-artery-targeted images, but demonstrates normal course and mild calcific atherosclerosis..  Cardiac motion artifacts particularly degrade images through the aortic root and ascending aorta, where some pre-existing aortic ectasia is questioned.    No mediastinal, hilar or axillary lymphadenopathy by size criteria.    Thyroid gland incompletely visualized; its visualized portion demonstrates diffuse enlargement and diffuse hypoattenuation.    Small hiatal hernia.  Mild splenomegaly measuring 12.4 cm.  Incompletely evaluated cortical hypodensities in the visualized right upper renal pole, favored to represent simple cysts.    Bilateral gynecomastia.  Degenerative changes of the osseous structures.  Flowing anterior osteophytes consistent with DISH.  No  acute fracture deformities are visualized.  Similar compression fracture of the T6 vertebral body, and mild multilevel intervertebral body wedging in the upper to midthoracic spine, also similar to the comparison study.  No aggressive osseous lesion is demonstrated.                                       X-Ray Chest AP Portable (Final result)  Result time 12/12/24 20:10:55      Final result by Johnnie Cool MD (12/12/24 20:10:55)                   Impression:      Findings suggestive of chronic interstitial lung disease.  No new large focal consolidation or detrimental change when compared to most recent prior.  Superimposed pulmonary edema or interstitial type pneumonia not excluded.      Electronically signed by: Johnnie Cool MD  Date:    12/12/2024  Time:    20:10               Narrative:    EXAMINATION:  XR CHEST AP PORTABLE    CLINICAL HISTORY:  Chest Pain;    TECHNIQUE:  Single frontal view of the chest was performed.    COMPARISON:  Chest radiograph 11/20/2024, CTA chest 09/25/2020    FINDINGS:  Monitoring leads overlie the chest.  Patient is slightly rotated.    Cardiomediastinal silhouette is midline and stable.  Hilar contours are within normal limits.  The lungs are normal to slightly hypoexpanded with grossly similar bilateral diffuse coarsened interstitial opacities with peripheral reticulation suggesting chronic interstitial lung disease better evaluated on prior CT chest.  Superimposed pulmonary edema or interstitial type pneumonia not excluded.  No consolidative process, sizeable pleural effusion or pneumothorax.  No acute osseous process seen.  PA and lateral views can be obtained.                                       Medications   sodium chloride 0.9% flush 10 mL (has no administration in time range)   naloxone 0.4 mg/mL injection 0.02 mg (has no administration in time range)   enoxaparin injection 40 mg (has no administration in time range)   dextrose 10% bolus 125 mL 125 mL (has no  administration in time range)   dextrose 10% bolus 250 mL 250 mL (has no administration in time range)   glucose chewable tablet 16 g (has no administration in time range)   glucose chewable tablet 24 g (has no administration in time range)   glucagon (human recombinant) injection 1 mg (has no administration in time range)   insulin aspart U-100 pen 0-5 Units (has no administration in time range)   methylPREDNISolone sodium succinate injection 40 mg (has no administration in time range)   albuterol-ipratropium 2.5 mg-0.5 mg/3 mL nebulizer solution 3 mL (has no administration in time range)   amLODIPine tablet 10 mg (has no administration in time range)   salmeteroL 50 mcg/dose diskus inhaler 1 puff (has no administration in time range)   atorvastatin tablet 40 mg (has no administration in time range)   budesonide nebulizer solution 1 mg (has no administration in time range)   famotidine tablet 40 mg (has no administration in time range)   furosemide tablet 20 mg (has no administration in time range)   isosorbide mononitrate 24 hr tablet 30 mg (has no administration in time range)   metoprolol succinate (TOPROL-XL) 24 hr tablet 50 mg (has no administration in time range)   sacubitriL-valsartan 24-26 mg per tablet 1 tablet (has no administration in time range)   tamsulosin 24 hr capsule 0.4 mg (has no administration in time range)   benzonatate capsule 100 mg (has no administration in time range)   acetaminophen tablet 650 mg (has no administration in time range)   iohexoL (OMNIPAQUE 350) injection 75 mL (75 mLs Intravenous Given 12/12/24 1845)   magnesium sulfate 2g in water 50mL IVPB (premix) (0 g Intravenous Stopped 12/12/24 2337)     Medical Decision Making  Amount and/or Complexity of Data Reviewed  Labs: ordered. Decision-making details documented in ED Course.  Radiology: ordered and independent interpretation performed. Decision-making details documented in ED Course.  ECG/medicine tests: ordered and  independent interpretation performed. Decision-making details documented in ED Course.    Risk  Prescription drug management.                     EKG:  Rate 117  Sinus tach  No STEMI                   73 y/o M here with dyspnea.  Stable on arrival, bit tachypneic, on 2-3L NC.  EKG sinus tach.  Normal WBC, normal lactate, VBG normal.  COVID/Flu/RVP all negative.  CTA negative for PE, incidental findings of nodules and enlarged thyroid noted. Discussed these with patient, needs outpatient follow-up for these, he expressed understanding.  Given worsening of ILD and worsening dyspnea with exertion will admit.  Patient agreeable with plan, admit to .        Clinical Impression:  Final diagnoses:  [J84.9] ILD (interstitial lung disease)          ED Disposition Condition    Observation                 Ousmane Awan MD  12/13/24 0036

## 2024-12-13 NOTE — PLAN OF CARE
[facility] - Cardiology Stepdown  Initial Discharge Assessment       Primary Care Provider: Wilfredo De Souza MD    Admission Diagnosis: Syncope [R55]  ILD (interstitial lung disease) [J84.9]  Chest pain [R07.9]    Admission Date: 12/12/2024  Expected Discharge Date: 12/14/2024    Transition of Care Barriers: (P) None    Payor: HUMANA MANAGED MEDICARE / Plan: HUMANA MEDICARE HMO / Product Type: Capitation /     Extended Emergency Contact Information  Primary Emergency Contact: Emmanuel Grant Jr   [place]  Mobile Phone: [phone]  Relation: Son    Discharge Plan A: (P) Home with family  Discharge Plan B: (P) Home      [id number] [address] AT [address] [address]  Phone: [phone] Fax: [phone]    [facility] [address]  [address]   [facility] [address]  Phone: [phone] Fax: [phone]    CM met with the pt at bedside. Pt answered all dc questions and given dc pamphlet. Family will provide transportation home when discharged. DC plan A is home with family. DC plan B is home.     Discharge Plan A and Plan B have been determined by review of patient's clinical status, future medical and therapeutic needs, and coverage/benefits for post-acute care in coordination with multidisciplinary team members.     Initial Assessment (most recent)       Adult Discharge Assessment - 12/13/24 1247          Discharge Assessment    Assessment Type Discharge Planning Assessment (P)      Confirmed/corrected address, phone number and insurance Yes (P)      Confirmed Demographics Correct on Facesheet (P)      Source of Information patient (P)      When was your last doctors appointment? -- (P)    x3 weeks ago    Does patient/caregiver understand observation status Yes (P)      Communicated DERIC with patient/caregiver Yes (P)      Reason For Admission lung disease (P)      People in Home alone (P)       Facility Arrived From: home (P)      Do you expect to return to your current living situation? Yes (P)      Do you have help at home or someone to help you manage your care at home? Yes (P)      Who are your caregiver(s) and their phone number(s)? Emmanuel Grant Jr (Son)  [phone] (P)      Prior to hospitilization cognitive status: Alert/Oriented (P)      Current cognitive status: Alert/Oriented (P)      Walking or Climbing Stairs Difficulty no (P)      Dressing/Bathing Difficulty no (P)      Equipment Currently Used at Home none (P)      Readmission within 30 days? No (P)      Patient currently being followed by outpatient case management? No (P)      Do you currently have service(s) that help you manage your care at home? No (P)      Do you take prescription medications? Yes (P)      Do you have prescription coverage? Yes (P)      Coverage HU[employer]NA MANAGED MEDICARE - HUMANA MEDICARE HMO - CAPITATED (P)      Do you have any problems affording any of your prescribed medications? No (P)      Is the patient taking medications as prescribed? yes (P)      Who is going to help you get home at discharge? Emmanuel Grant Jr (Son)  [phone] (P)      How do you get to doctors appointments? car, drives self (P)      Are you on dialysis? No (P)      Do you take coumadin? No (P)      Discharge Plan A Home with family (P)      Discharge Plan B Home (P)      DME Needed Upon Discharge  none (P)      Discharge Plan discussed with: Patient (P)      Transition of Care Barriers None (P)         Physical Activity    On average, how many days per week do you engage in moderate to strenuous exercise (like a brisk walk)? 0 days (P)      On average, how many minutes do you engage in exercise at this level? 0 min (P)         Financial Resource Strain    How hard is it for you to pay for the very basics like food, housing, medical care, and heating? Not very hard (P)         Housing Stability    In the last 12 months, was there a  time when you were not able to pay the mortgage or rent on time? No (P)      At any time in the past 12 months, were you homeless or living in a shelter (including now)? No (P)         Transportation Needs    Has the lack of transportation kept you from medical appointments, meetings, work or from getting things needed for daily living? No (P)         Food Insecurity    Within the past 12 months, you worried that your food would run out before you got the money to buy more. Never true (P)      Within the past 12 months, the food you bought just didn't last and you didn't have money to get more. Never true (P)         Stress    Do you feel stress - tense, restless, nervous, or anxious, or unable to sleep at night because your mind is troubled all the time - these days? Not at all (P)         Social Isolation    How often do you feel lonely or isolated from those around you?  Never (P)         Alcohol Use    Q1: How often do you have a drink containing alcohol? Never (P)      Q2: How many drinks containing alcohol do you have on a typical day when you are drinking? Patient does not drink (P)      Q3: How often do you have six or more drinks on one occasion? Never (P)         Utilities    In the past 12 months has the electric, gas, oil, or water company threatened to shut off services in your home? No (P)         Health Literacy    How often do you need to have someone help you when you read instructions, pamphlets, or other written material from your doctor or pharmacy? Never (P)                       Janeen Jj, MSN  RN Case Management  [phone]

## 2024-12-13 NOTE — ASSESSMENT & PLAN NOTE
Patient with Hypoxic Respiratory failure which is Chronic.  he is on home oxygen at 2 LPM. Supplemental oxygen was provided and noted-      .   Signs/symptoms of respiratory failure include- tachypnea and increased work of breathing. Contributing diagnoses includes - Interstitial lung disease Labs and images were reviewed. Patient Has not had a recent ABG. Will treat underlying causes and adjust management of respiratory failure as follows    - See Interstitial lung disease

## 2024-12-13 NOTE — SUBJECTIVE & OBJECTIVE
"Past Medical History:   Diagnosis Date    CAD (coronary artery disease)     Sees [facility], h/o stent    Diabetes mellitus     Hypertension     Kidney stone     Stroke     age 62       Past Surgical History:   Procedure Laterality Date    24 HOUR IMPEDANCE PH MONITORING OF ESOPHAGUS IN PATIENT NOT TAKING ACID REDUCING MEDICATIONS N/A 11/13/2024    Procedure: IMPEDANCE PH STUDY, ESOPHAGEAL, 24 HOUR, IN PATIENT NOT TAKING ACID REDUCING MEDICATION;  Surgeon: Stephany Mendoza MD;  Location: [facility] (4TH FLR);  Service: Endoscopy;  Laterality: N/A;  referral dr miguel/ off ppi / prep inst sent to pt via mail  11/6- precall attempted no answer. Unable to [facility]-  11/8 - pt not called, per chart review patient currently inpatient at [place] [id number]    CARDIAC CATHETERIZATION      with stent    COLONOSCOPY N/A 03/27/2024    Procedure: COLONOSCOPY;  Surgeon: Ariela Castro MD;  Location: [facility] ENDO;  Service: Endoscopy;  Laterality: N/A;    ESOPHAGEAL MANOMETRY WITH MEASUREMENT OF IMPEDANCE N/A 11/13/2024    Procedure: MANOMETRY, ESOPHAGUS, WITH IMPEDANCE MEASUREMENT;  Surgeon: Stephany Mendoza MD;  Location: [facility] (4TH FLR);  Service: Endoscopy;  Laterality: N/A;    ESOPHAGOGASTRODUODENOSCOPY N/A 03/27/2024    Procedure: EGD (ESOPHAGOGASTRODUODENOSCOPY);  Surgeon: Ariela Castro MD;  Location: [facility] ENDO;  Service: Endoscopy;  Laterality: N/A;    LEFT HEART CATHETERIZATION Left 9/10/2024    Procedure: Left heart cath;  Surgeon: Jemal Drummond MD;  Location: [facility] CATH LAB;  Service: Cardiology;  Laterality: Left;    SHOULDER SURGERY Right        Review of patient's allergies indicates:   Allergen Reactions    Dapagliflozin      Other reaction(s): Other (See Comments)    Pcn [penicillins]     Linagliptin Other (See Comments)     "it knocked me down", "it almost killed me"    Lisinopril Other (See Comments)     cough    Pantoprazole Hives       Family History       Problem Relation (Age of Onset)    Cancer " Mother    Colon cancer Sister          Tobacco Use    Smoking status: Never     Passive exposure: Never    Smokeless tobacco: Never   Substance and Sexual Activity    Alcohol use: No    Drug use: Never    Sexual activity: Not Currently         Review of Systems  Objective:     Vital Signs (Most Recent):  Temp: 97.7 °F (36.5 °C) (12/13/24 1124)  Pulse: 90 (12/13/24 1521)  Resp: 19 (12/13/24 1124)  BP: (!) 144/82 (12/13/24 1124)  SpO2: 97 % (12/13/24 1124) Vital Signs (24h Range):  Temp:  [97.6 °F (36.4 °C)-98.9 °F (37.2 °C)] 97.7 °F (36.5 °C)  Pulse:  [] 90  Resp:  [16-20] 19  SpO2:  [93 %-98 %] 97 %  BP: (136-175)/(79-97) 144/82     Weight: 60 kg (132 lb 4.4 oz)  Body mass index is 20.11 kg/m².      Intake/Output Summary (Last 24 hours) at 12/13/2024 1543  Last data filed at 12/13/2024 1124  Gross per 24 hour   Intake 716 ml   Output 1000 ml   Net -284 ml        Physical Exam  Vitals and nursing note reviewed.   Constitutional:       Appearance: He is ill-appearing.   HENT:      Head: Normocephalic and atraumatic.      Nose: Nose normal.      Mouth/Throat:      Pharynx: Oropharynx is clear.   Eyes:      Conjunctiva/sclera: Conjunctivae normal.   Cardiovascular:      Rate and Rhythm: Normal rate and regular rhythm.      Pulses: Normal pulses.      Heart sounds: Normal heart sounds.   Pulmonary:      Breath sounds: No rales.      Comments: Increased effort with speaking  Abdominal:      General: Abdomen is flat.      Tenderness: There is no abdominal tenderness. There is no guarding or rebound.   Musculoskeletal:      Right lower leg: No edema.      Left lower leg: No edema.   Neurological:      General: No focal deficit present.      Mental Status: He is alert and oriented to person, place, and time. Mental status is at baseline.   Psychiatric:         Behavior: Behavior normal.          Vents:       Lines/Drains/Airways       Peripheral Intravenous Line  Duration                  Peripheral IV - Single Lumen  12/12/24 1800 18 G Right Antecubital <1 day                    Significant Labs:    CBC/Anemia Profile:  Recent Labs   Lab 12/12/24  1753 12/13/24  0419   WBC 6.56 5.66   HGB 13.1* 11.6*   HCT 37.8* 35.8*    184   MCV 83 86   RDW 13.8 13.9        Chemistries:  Recent Labs   Lab 12/12/24  1753 12/13/24  0419    137   K 3.2* 3.2*    105   CO2 24 23   BUN 7* 8   CREATININE 0.7 0.8   CALCIUM 9.0 8.5*   ALBUMIN 3.2* 2.8*   PROT 6.6 5.7*   BILITOT 0.6 0.5   ALKPHOS 57 53   ALT 10 10   AST 17 15   MG 1.5* 1.9   PHOS  --  2.4*       All pertinent labs within the past 24 hours have been reviewed.    Significant Imaging:   I have reviewed all pertinent imaging results/findings within the past 24 hours.

## 2024-12-13 NOTE — ASSESSMENT & PLAN NOTE
Patient's blood pressure range in the last 24 hours was: BP  Min: 136/86  Max: 160/97.The patient's inpatient anti-hypertensive regimen is listed below:  Current Antihypertensives  amLODIPine tablet 10 mg, Daily, Oral  furosemide tablet 20 mg, Daily, Oral  isosorbide mononitrate 24 hr tablet 30 mg, Daily, Oral  metoprolol succinate (TOPROL-XL) 24 hr tablet 50 mg, Daily, Oral    Plan  - BP is controlled, no changes needed to their regimen  - Continue home antihypertensives

## 2024-12-13 NOTE — ASSESSMENT & PLAN NOTE
Patient has Systolic (HFrEF) heart failure that is Chronic. On presentation their CHF was well compensated. Most recent BNP and echo results are listed below.  Recent Labs     12/12/24  1753   *     Latest ECHO  Results for orders placed during the hospital encounter of 10/17/24    Echo    Interpretation Summary    Left Ventricle: Regional wall motion abnormalities present. There is normal systolic function with a visually estimated ejection fraction of 55 %. Grade I diastolic dysfunction. federico septal hypokinesis    Right Ventricle: Normal right ventricular cavity size. Systolic function is normal.    Left Atrium: Left atrium is mildly dilated.    IVC/SVC: Normal venous pressure at 3 mmHg.    Current Heart Failure Medications  furosemide tablet 20 mg, Daily, Oral  metoprolol succinate (TOPROL-XL) 24 hr tablet 50 mg, Daily, Oral  sacubitriL-valsartan 24-26 mg per tablet 1 tablet, 2 times daily, Oral    Plan  - Euvolemic on exam. Continue home Lasix. Consider IV Lasix if needed  - Monitor strict I&Os and daily weights.    - Place on telemetry  - Low sodium diet  - Place on fluid restriction of 1.5 L.   - Cardiology has not been consulted  - The patient's volume status is stable but not at their baseline as indicated by crackles on lung auscultation, dyspnea on exertion (BOSTON), and shortness of breath

## 2024-12-13 NOTE — H&P
[facility] - Emergency Dept  [facility] Medicine  History & Physical    Patient Name: Emmanuel Grant  MRN: [id number]  Patient Class: OP- Observation  Admission Date: 12/12/2024  Attending Physician: Caleb Lei MD   Primary Care Provider: Wilfredo De Souza MD         Patient information was obtained from patient, past medical records, and ER records.     Subjective:     Principal Problem:Interstitial lung disease    Chief Complaint:   Chief Complaint   Patient presents with    Shortness of Breath     Cp/sob while @ pulmonary appointment, arrives on nc o2 speaks in full sentences.        HPI: 73yoM w/hx of T2DM, non-obstructive CAD, HTN, chronic hypoxic respiratory failure 2/2 ILD (on 2LNC at home; Cellcept for therapy), hx of CVA who presents to the hospital from Pulmonology clinic for SOB. He was being seen in Pulm clinic today when his Pulmonologist noticed that he was having increased work of breathing and worsening respiratory distress. Wanted him to be evaluated in the ED for possible infection and to rule out PE. Patient has had multiple admissions for ACHRF in the last year. He reports that ever since January he has been dealing with dyspnea and cough. He typically only feels SOB during exertion but does feel it when he is at rest but talking as well. He is compliant with all of his medications and inhalers but feels like they are not working anymore. He reports some chest heaviness and SOB but denies abdominal pain, back pain, nausea, vomiting, diarrhea. He became sad during during examination as he states that he is tired of being sick and having to come back and forth to the hospital.     In the ED, patient was HDS. Afebrile. Hypertensive in the 140s to 160s and tachycardic to the 120s. Other VSS. Satting well on RA though nasal cannula was in place. Labs were notable for Hgb 13.1, Hct 37.8, K 3.2, Mag 1.5, Alb 3.2. BNP mildly elevated at 107. COVID/Flu negative. RIP negative. CXR suggestive of  chronic interstitial lung disease. No new large focal consolidation or detrimental change when compared to most recent prior. CTA negative for PE with honeycombing, traction bronchiectasis, and subpleural reticulations consistent with interstitial lung disease. Received IV Magnesium in the ED. Admitted to [facility] secondary to ILD.     Past Medical History:   Diagnosis Date    CAD (coronary artery disease)     Sees [facility], h/o stent    Diabetes mellitus     Hypertension     Kidney stone     Stroke     age 62       Past Surgical History:   Procedure Laterality Date    24 HOUR IMPEDANCE PH MONITORING OF ESOPHAGUS IN PATIENT NOT TAKING ACID REDUCING MEDICATIONS N/A 11/13/2024    Procedure: IMPEDANCE PH STUDY, ESOPHAGEAL, 24 HOUR, IN PATIENT NOT TAKING ACID REDUCING MEDICATION;  Surgeon: Stephany Mendoza MD;  Location: [facility] (4TH FLR);  Service: Endoscopy;  Laterality: N/A;  referral dr miguel/ off ppi / prep inst sent to pt via mail  11/6- precall attempted no answer. Unable to [facility]-  11/8 - pt not called, per chart review patient currently inpatient at [place] [id number]    CARDIAC CATHETERIZATION      with stent    COLONOSCOPY N/A 03/27/2024    Procedure: COLONOSCOPY;  Surgeon: Ariela Castro MD;  Location: [facility] ENDO;  Service: Endoscopy;  Laterality: N/A;    ESOPHAGEAL MANOMETRY WITH MEASUREMENT OF IMPEDANCE N/A 11/13/2024    Procedure: MANOMETRY, ESOPHAGUS, WITH IMPEDANCE MEASUREMENT;  Surgeon: Stephany Mendoza MD;  Location: [facility] ENDO (4TH FLR);  Service: Endoscopy;  Laterality: N/A;    ESOPHAGOGASTRODUODENOSCOPY N/A 03/27/2024    Procedure: EGD (ESOPHAGOGASTRODUODENOSCOPY);  Surgeon: Ariela Castro MD;  Location: [facility] ENDO;  Service: Endoscopy;  Laterality: N/A;    LEFT HEART CATHETERIZATION Left 9/10/2024    Procedure: Left heart cath;  Surgeon: Jemal Drummond MD;  Location: [facility] CATH LAB;  Service: Cardiology;  Laterality: Left;    SHOULDER SURGERY Right        Review of patient's allergies  "indicates:   Allergen Reactions    Dapagliflozin      Other reaction(s): Other (See Comments)    Pcn [penicillins]     Linagliptin Other (See Comments)     "it knocked me down", "it almost killed me"    Lisinopril Other (See Comments)     cough    Pantoprazole Hives       No current facility-administered medications on file prior to encounter.     Current Outpatient Medications on File Prior to Encounter   Medication Sig    albuterol-ipratropium (DUO-NEB) 2.5 mg-0.5 mg/3 mL nebulizer solution Take 3 mLs by nebulization every 6 (six) hours as needed for Wheezing. Rescue    amLODIPine (NORVASC) 10 MG tablet Take 1 tablet (10 mg total) by mouth once daily.    arformoteroL (BROVANA) 15 mcg/2 mL Nebu Take 2 mLs (15 mcg total) by nebulization 2 (two) times daily. Controller    atorvastatin (LIPITOR) 40 MG tablet Take 1 tablet (40 mg total) by mouth every evening.    budesonide (PULMICORT) 0.5 mg/2 mL nebulizer solution Take 4 mLs (1 mg total) by nebulization 2 (two) times daily. Controller    famotidine (PEPCID) 40 MG tablet Take 1 tablet (40 mg total) by mouth 2 (two) times daily.    furosemide (LASIX) 20 MG tablet Take 1 tablet (20 mg total) by mouth once daily.    glimepiride (AMARYL) 2 MG tablet Take 1 tablet (2 mg total) by mouth 2 (two) times a day. Take with meals    metoprolol succinate (TOPROL-XL) 50 MG 24 hr tablet Take 1 tablet (50 mg total) by mouth once daily.    mycophenolate (CELLCEPT) 250 mg Cap Take 4 capsules (1,000 mg total) by mouth 2 (two) times daily.    alpha-D-galactosidase (BEANO ORAL) Take 1 tablet by mouth once daily.    aspirin 81 MG Chew Take 81 mg by mouth once daily.    blood-glucose sensor (FREESTYLE RAMBO 3 PLUS SENSOR) Blanche Change every 15 days    cholecalciferol, vitamin D3, (VITAMIN D3) 50 mcg (2,000 unit) Cap capsule Take 1 capsule by mouth once daily.    fluticasone-salmeterol diskus inhaler 250-50 mcg Inhale 1 puff into the lungs 2 (two) times daily. Controller    isosorbide " mononitrate (IMDUR) 30 MG 24 hr tablet Take 1 tablet (30 mg total) by mouth once daily.    metFORMIN (GLUCOPHAGE) 1000 MG tablet Take 1 tablet (1,000 mg total) by mouth 2 (two) times daily with meals.    mirabegron (MYRBETRIQ) 25 mg Tb24 ER tablet Take 1 tablet (25 mg total) by mouth once daily.    nitroGLYCERIN (NITROSTAT) 0.3 MG SL tablet Place 1 tablet (0.3 mg total) under the tongue every 5 (five) minutes as needed for Chest pain.    predniSONE (DELTASONE) 10 MG tablet Take 1 tablet (10 mg total) by mouth once daily. for 7 days    sacubitriL-valsartan (ENTRESTO) 24-26 mg per tablet Take 1 tablet by mouth 2 (two) times daily.    sucralfate (CARAFATE) 1 gram tablet Take 1 tablet (1 g total) by mouth 4 (four) times daily before meals and nightly.    tamsulosin (FLOMAX) 0.4 mg Cap Take 1 capsule (0.4 mg total) by mouth once daily.     Family History       Problem Relation (Age of Onset)    Cancer Mother    Colon cancer Sister          Tobacco Use    Smoking status: Never     Passive exposure: Never    Smokeless tobacco: Never   Substance and Sexual Activity    Alcohol use: No    Drug use: Never    Sexual activity: Not Currently     Review of Systems   Constitutional:  Negative for chills, fatigue and unexpected weight change.   HENT:  Negative for congestion, sinus pain and sore throat.    Eyes:  Negative for redness and visual disturbance.   Respiratory:  Positive for cough and shortness of breath. Negative for chest tightness.    Cardiovascular:  Negative for chest pain, palpitations and leg swelling.   Gastrointestinal:  Negative for abdominal pain, diarrhea, nausea and vomiting.   Musculoskeletal:  Negative for arthralgias, back pain and joint swelling.   Neurological:  Negative for weakness, light-headedness and headaches.     Objective:     Vital Signs (Most Recent):  Temp: 97.7 °F (36.5 °C) (12/12/24 2232)  Pulse: 85 (12/12/24 2232)  Resp: 20 (12/12/24 2232)  BP: (!) 156/89 (12/12/24 2232)  SpO2: 98 %  (12/12/24 8989) Vital Signs (24h Range):  Temp:  [97.6 °F (36.4 °C)-98.6 °F (37 °C)] 97.7 °F (36.5 °C)  Pulse:  [] 85  Resp:  [16-20] 20  SpO2:  [92 %-98 %] 98 %  BP: (136-156)/(79-97) 156/89     Weight: 63.5 kg (140 lb)  Body mass index is 21.29 kg/m².     Physical Exam  Constitutional:       General: He is not in acute distress.     Appearance: Normal appearance. He is ill-appearing.   HENT:      Head: Normocephalic.      Right Ear: External ear normal.      Left Ear: External ear normal.      Mouth/Throat:      Mouth: Mucous membranes are moist.      Pharynx: Oropharynx is clear.   Eyes:      General: No scleral icterus.     Extraocular Movements: Extraocular movements intact.   Cardiovascular:      Rate and Rhythm: Normal rate and regular rhythm.      Pulses: Normal pulses.      Heart sounds: Normal heart sounds. No murmur heard.     No friction rub. No gallop.   Pulmonary:      Effort: Pulmonary effort is normal. No respiratory distress.      Breath sounds: No stridor. Examination of the right-upper field reveals rales. Examination of the left-upper field reveals rales. Examination of the right-lower field reveals rales. Examination of the left-lower field reveals rales. Rales present. No wheezing.      Comments: Crackles auscultated throughout lobes. Trouble breathing while talking, requiring extra breaths after every couple of words  Chest:      Chest wall: No tenderness.   Abdominal:      General: Abdomen is flat. Bowel sounds are normal. There is no distension.      Palpations: Abdomen is soft.      Tenderness: There is no abdominal tenderness.   Musculoskeletal:         General: No swelling.      Right lower leg: No edema.      Left lower leg: No edema.   Skin:     General: Skin is warm.      Coloration: Skin is not jaundiced.      Findings: No erythema or rash.   Neurological:      General: No focal deficit present.      Mental Status: He is alert and oriented to person, place, and time. Mental  status is at baseline.   Psychiatric:         Mood and Affect: Mood normal.         Behavior: Behavior normal.         Thought Content: Thought content normal.                Significant Labs: All pertinent labs within the past 24 hours have been reviewed.    Significant Imaging: I have reviewed all pertinent imaging results/findings within the past 24 hours.  Assessment/Plan:     * Interstitial lung disease exacerbation  74yoM w/hx of ILD (on Cellcept), chronic hypoxic respiratory failure (on 2LNC), HTN, T2DM, HLD, GERD, BPH who presents to the hospital for increased WOB and SOB from Pulm clinic. Tachypneic and hypoxic in the ED, initially requiring additional oxygen. COVID/Flu negative. Respiratory panel negative. VBG 7.38/42.4/20. CTA negative for PE but with honeycombing, traction bronchiectasis, and subpleural reticulations consistent with interstitial lung disease. Admitted to the hospital for AHRF concerning for ILD exacerbation.     While patient does not have new bilateral opacities or other findings on imaging suggestive of ILD exacerbation, he does have increased WOB and clinically uncomfortable on exam. Possibly progression of his ILD. Superimposed lung infection and pulmonary edema were considered but less likely due to no signs of infection and only slight elevation in BNP but no other signs of volume overload. I believe its reasonable to treat patient for ILD exacerbation. Has improved with IV steroids in the past so will start while admitted.       Plan:   - Started IV Solumedrol 40mg daily. Taper when appropriate  - Continue daily nebulizers and inhalers  - Continue Cellcept as no sign of infection at this time. Monitor for GI symptoms  - PRN Tessalon pearls for cough  - F/u Bcx   - Consider diuresis if patient is not responsive to steroids.   - Supplemental O2    Chronic hypoxemic respiratory failure  Patient with Hypoxic Respiratory failure which is Chronic.  he is on home oxygen at 2 LPM.  Supplemental oxygen was provided and noted-      .   Signs/symptoms of respiratory failure include- tachypnea and increased work of breathing. Contributing diagnoses includes - Interstitial lung disease Labs and images were reviewed. Patient Has not had a recent ABG. Will treat underlying causes and adjust management of respiratory failure as follows    - See Interstitial lung disease     Chronic HFrEF (heart failure with reduced ejection fraction)  Patient has Systolic (HFrEF) heart failure that is Chronic. On presentation their CHF was well compensated. Most recent BNP and echo results are listed below.  Recent Labs     12/12/24  1753   *     Latest ECHO  Results for orders placed during the hospital encounter of 10/17/24    Echo    Interpretation Summary    Left Ventricle: Regional wall motion abnormalities present. There is normal systolic function with a visually estimated ejection fraction of 55 %. Grade I diastolic dysfunction. federico septal hypokinesis    Right Ventricle: Normal right ventricular cavity size. Systolic function is normal.    Left Atrium: Left atrium is mildly dilated.    IVC/SVC: Normal venous pressure at 3 mmHg.    Current Heart Failure Medications  furosemide tablet 20 mg, Daily, Oral  metoprolol succinate (TOPROL-XL) 24 hr tablet 50 mg, Daily, Oral  sacubitriL-valsartan 24-26 mg per tablet 1 tablet, 2 times daily, Oral    Plan  - Euvolemic on exam. Continue home Lasix. Consider IV Lasix if needed  - Monitor strict I&Os and daily weights.    - Place on telemetry  - Low sodium diet  - Place on fluid restriction of 1.5 L.   - Cardiology has not been consulted  - The patient's volume status is stable but not at their baseline as indicated by crackles on lung auscultation, dyspnea on exertion (BOSTON), and shortness of breath      Hyperlipidemia  - Continue home statin      Essential hypertension  Patient's blood pressure range in the last 24 hours was: BP  Min: 136/86  Max: 160/97.The  patient's inpatient anti-hypertensive regimen is listed below:  Current Antihypertensives  amLODIPine tablet 10 mg, Daily, Oral  furosemide tablet 20 mg, Daily, Oral  isosorbide mononitrate 24 hr tablet 30 mg, Daily, Oral  metoprolol succinate (TOPROL-XL) 24 hr tablet 50 mg, Daily, Oral    Plan  - BP is controlled, no changes needed to their regimen  - Continue home antihypertensives    GERD (gastroesophageal reflux disease)  - Continue home Pepcid. Hx of Protonix allergy noted in chart.       BPH (benign prostatic hyperplasia)  - Continue home Flomax      Coronary artery disease involving native coronary artery of native heart with angina pectoris  Patient with known CAD s/p  no stent placement , which is controlled Will continue Statin and monitor for S/Sx of angina/ACS. Continue to monitor on telemetry.     Type 2 diabetes mellitus without complication, without long-term current use of insulin  Last Hb A1c   Lab Results   Component Value Date    HGBA1C 8.0 (H) 09/25/2024     Home regimen: Metformin and Glimepride     - Hold oral anti-glycemics while hospitalized  - low dose SSI  - Accu-checks q AC/HS  - Goal glucose 140-180  - Titrate insulin regimen as needed. Use caution with patients with poor kidney function as they will have decreased insulin clearance and can precipitate hypoglycemia.  - Diabetic diet as tolerated  - Hypoglycemia protocol in place  - If blood glucose greater than 300, please ask patient not to eat food or drink anything other than water until correctional insulin has brought it back below 250  - If patient is NPO, please hold pre-meal Aspart (Daily Detemir/basal insulin is ok to give even if patient is NPO)          VTE Risk Mitigation (From admission, onward)           Ordered     enoxaparin injection 40 mg  Daily         12/12/24 2303     IP VTE HIGH RISK PATIENT  Once         12/12/24 2303     Place sequential compression device  Until discontinued         12/12/24 2303                            On 12/13/2024, patient should be placed in hospital observation services under my care in collaboration with Hospital Medicine Team 4.         Marcie Haro MD  Department of Hospital Medicine  [facility] - Emergency Dept

## 2024-12-13 NOTE — ASSESSMENT & PLAN NOTE
Patient with known CAD s/p  no stent placement , which is controlled Will continue Statin and monitor for S/Sx of angina/ACS. Continue to monitor on telemetry.

## 2024-12-13 NOTE — HPI
Mr. Grant is a 74-year-old gentleman with type 2 diabetes mellitus, hypertension, coronary artery disease, heart failure with recovered ejection fraction, history of CVA, interstitial lung disease (idiopathic) with chronic hypoxic respiratory failure on 2 L p.m. O2 via NC at home. He came to our emergency department at the behest of his pulmonologist, Dr. Schmitt, for persistent respiratory distress with increased work of breathing. Pulmonology consulted for further management of ILD. Patient reports SOB ongoing for 1 week prior to seeing Dr. Schmitt in clinic. He states he uses his albuterol nebulizer twice a day which used to provide relief but as of lately has not. He completed a prolonged steroid taper approximately 2 weeks ago which also did not provide much relief. He endorses subjective fevers and chills. Denies any sick contacts or any smoking history.

## 2024-12-13 NOTE — ED TRIAGE NOTES
Pt arriving to ED C/o sob while @ pulmonary appointment, arrives on nc o2 speaks in full sentences. Wears 2L NC at baseline

## 2024-12-13 NOTE — CONSULTS
[facility] - Cardiology Stepdown  Pulmonology  Consult Note    Patient Name: Emmanuel Grant  MRN: [id number]  Admission Date: 12/12/2024  Hospital Length of Stay: 0 days  Code Status: Full Code  Attending Physician: Caleb Lei MD  Primary Care Provider: Wilfredo De Souza MD   Principal Problem: Interstitial lung disease    Inpatient consult to Pulmonology  Consult performed by: Samad, Mawadah, MD  Consult ordered by: Ángel Boyle MD        Subjective:     HPI:  Mr. Grant is a 74-year-old gentleman with type 2 diabetes mellitus, hypertension, coronary artery disease, heart failure with recovered ejection fraction, history of CVA, interstitial lung disease (idiopathic) with chronic hypoxic respiratory failure on 2 L p.m. O2 via NC at home. He came to our emergency department at the behest of his pulmonologist, Dr. Schmitt, for persistent respiratory distress with increased work of breathing. Pulmonology consulted for further management of ILD. Patient reports SOB ongoing for 1 week prior to seeing Dr. Schmitt in clinic. He states he uses his albuterol nebulizer twice a day which used to provide relief but as of lately has not. He completed a prolonged steroid taper approximately 2 weeks ago which also did not provide much relief. He endorses subjective fevers and chills. Denies any sick contacts or any smoking history.     Past Medical History:   Diagnosis Date    CAD (coronary artery disease)     Sees [facility], h/o stent    Diabetes mellitus     Hypertension     Kidney stone     Stroke     age 62       Past Surgical History:   Procedure Laterality Date    24 HOUR IMPEDANCE PH MONITORING OF ESOPHAGUS IN PATIENT NOT TAKING ACID REDUCING MEDICATIONS N/A 11/13/2024    Procedure: IMPEDANCE PH STUDY, ESOPHAGEAL, 24 HOUR, IN PATIENT NOT TAKING ACID REDUCING MEDICATION;  Surgeon: Stephany Mendoza MD;  Location: [facility] ([address] [place] [address]);  Service: Endoscopy;  Laterality: N/A;  referral dr miguel/ off ppi / prep inst sent to pt via  "mail  11/6- precall attempted no answer. Unable to [facility]-  11/8 - pt not called, per chart review patient currently inpatient at [place] [id number]    CARDIAC CATHETERIZATION      with stent    COLONOSCOPY N/A 03/27/2024    Procedure: COLONOSCOPY;  Surgeon: Ariela Castro MD;  Location: [facility] ENDO;  Service: Endoscopy;  Laterality: N/A;    ESOPHAGEAL MANOMETRY WITH MEASUREMENT OF IMPEDANCE N/A 11/13/2024    Procedure: MANOMETRY, ESOPHAGUS, WITH IMPEDANCE MEASUREMENT;  Surgeon: Stephany Mendoza MD;  Location: [facility] ENDO ([facility] FLR);  Service: Endoscopy;  Laterality: N/A;    ESOPHAGOGASTRODUODENOSCOPY N/A 03/27/2024    Procedure: EGD (ESOPHAGOGASTRODUODENOSCOPY);  Surgeon: Ariela Castro MD;  Location: [facility] ENDO;  Service: Endoscopy;  Laterality: N/A;    LEFT HEART CATHETERIZATION Left 9/10/2024    Procedure: Left heart cath;  Surgeon: Jemal Drummond MD;  Location: [facility] CATH LAB;  Service: Cardiology;  Laterality: Left;    SHOULDER SURGERY Right        Review of patient's allergies indicates:   Allergen Reactions    Dapagliflozin      Other reaction(s): Other (See Comments)    Pcn [penicillins]     Linagliptin Other (See Comments)     "it knocked me down", "it almost killed me"    Lisinopril Other (See Comments)     cough    Pantoprazole Hives       Family History       Problem Relation (Age of Onset)    Cancer Mother    Colon cancer Sister          Tobacco Use    Smoking status: Never     Passive exposure: Never    Smokeless tobacco: Never   Substance and Sexual Activity    Alcohol use: No    Drug use: Never    Sexual activity: Not Currently         Review of Systems  Objective:     Vital Signs (Most Recent):  Temp: 97.7 °F (36.5 °C) (12/13/24 1124)  Pulse: 90 (12/13/24 1521)  Resp: 19 (12/13/24 1124)  BP: (!) 144/82 (12/13/24 1124)  SpO2: 97 % (12/13/24 1124) Vital Signs (24h Range):  Temp:  [97.6 °F (36.4 °C)-98.9 °F (37.2 °C)] 97.7 °F (36.5 °C)  Pulse:  [] 90  Resp:  [16-20] 19  SpO2:  [93 %-98 " %] 97 %  BP: (136-175)/(79-97) 144/82     Weight: 60 kg (132 lb 4.4 oz)  Body mass index is 20.11 kg/m².      Intake/Output Summary (Last 24 hours) at 12/13/2024 1543  Last data filed at 12/13/2024 1124  Gross per 24 hour   Intake 716 ml   Output 1000 ml   Net -284 ml        Physical Exam  Vitals and nursing note reviewed.   Constitutional:       Appearance: He is ill-appearing.   HENT:      Head: Normocephalic and atraumatic.      Nose: Nose normal.      Mouth/Throat:      Pharynx: Oropharynx is clear.   Eyes:      Conjunctiva/sclera: Conjunctivae normal.   Cardiovascular:      Rate and Rhythm: Normal rate and regular rhythm.      Pulses: Normal pulses.      Heart sounds: Normal heart sounds.   Pulmonary:      Breath sounds: No rales.      Comments: Increased effort with speaking  Abdominal:      General: Abdomen is flat.      Tenderness: There is no abdominal tenderness. There is no guarding or rebound.   Musculoskeletal:      Right lower leg: No edema.      Left lower leg: No edema.   Neurological:      General: No focal deficit present.      Mental Status: He is alert and oriented to person, place, and time. Mental status is at baseline.   Psychiatric:         Behavior: Behavior normal.          Vents:       Lines/Drains/Airways       Peripheral Intravenous Line  Duration                  Peripheral IV - Single Lumen 12/12/24 1800 18 G Right Antecubital <1 day                    Significant Labs:    CBC/Anemia Profile:  Recent Labs   Lab 12/12/24  1753 12/13/24  0419   WBC 6.56 5.66   HGB 13.1* 11.6*   HCT 37.8* 35.8*    184   MCV 83 86   RDW 13.8 13.9        Chemistries:  Recent Labs   Lab 12/12/24  1753 12/13/24  0419    137   K 3.2* 3.2*    105   CO2 24 23   BUN 7* 8   CREATININE 0.7 0.8   CALCIUM 9.0 8.5*   ALBUMIN 3.2* 2.8*   PROT 6.6 5.7*   BILITOT 0.6 0.5   ALKPHOS 57 53   ALT 10 10   AST 17 15   MG 1.5* 1.9   PHOS  --  2.4*       All pertinent labs within the past 24 hours have been  reviewed.    Significant Imaging:   I have reviewed all pertinent imaging results/findings within the past 24 hours.  Assessment/Plan:     Pulmonary  * Interstitial lung disease exacerbation  75y/o M w/hx of ILD (on Cellcept), chronic hypoxic respiratory failure (on 2LNC), HTN, T2DM, HLD, GERD, BPH who presents to the hospital for increased WOB and SOB from Pulm clinic. Tachypneic and hypoxic in the ED, initially requiring additional oxygen. COVID/Flu and Respiratory panel negative. VBG 7.38/42.4/20. CTA negative for PE but with honeycombing, traction bronchiectasis, and subpleural reticulations consistent with interstitial lung disease. Physical examination with increase WOB and clinically uncomfortable on exam. CT imaging from 12/12/2024 and 9/25/2024 with no significant differences. Admitted to the hospital for AHRF concerning for ILD exacerbation.     Recommendations:   - Start empiric CAP coverage  - Duonebs (patient typically uses them twice daily, consider scheduling them while patient is awake)  - Increasing methylpred 40 mg from qd to BID          Thank you for your consult. I will follow-up with patient. Please contact us if you have any additional questions.     Mawadah Samad, MD  Pulmonology  [facility] - Cardiology Stepdown

## 2024-12-13 NOTE — ED NOTES
"Received report from KANDI Valdes    LOC: The patient is awake, alert and aware of environment with an appropriate affect, the patient is oriented x 3 and speaking appropriately.   APPEARANCE: Patient appears comfortable and in no acute distress, patient is clean and well groomed.  SKIN: The skin is warm and dry, color consistent with ethnicity, patient has normal skin turgor and moist mucus membranes, skin intact, no breakdown or bruising noted.   MUSCULOSKELETAL: Patient moving all extremities spontaneously, no swelling noted.  RESPIRATORY: Airway is open and patent, respirations are spontaneous, patient has a normal effort and rate, no accessory muscle. Pt reports slight SOB, currently on 2L via NC states it has helped SOB, no labored breathing noted.  CARDIAC: Pt placed on cardiac monitor. Patient has a normal rate and regular rhythm, no edema noted, capillary refill < 3 seconds. Denies CP, reports "heaviness" due to SOB.  GASTRO: Soft and non tender to palpation, no distention noted. Denies N/V/D.  : Pt denies any pain or frequency with urination.  NEURO: Pt opens eyes spontaneously, behavior appropriate to situation, follows commands, facial expression symmetrical, bilateral hand grasp equal and even, purposeful motor response noted, normal sensation in all extremities when touched with a finger.        "

## 2024-12-13 NOTE — HPI
73yoM w/hx of T2DM, non-obstructive CAD, HTN, chronic hypoxic respiratory failure 2/2 ILD (on 2LNC at home; Cellcept for therapy), hx of CVA who presents to the hospital from Pulmonology clinic for SOB. He was being seen in Pulm clinic today when his Pulmonologist noticed that he was having increased work of breathing and worsening respiratory distress. Wanted him to be evaluated in the ED for possible infection and to rule out PE. Patient has had multiple admissions for ACHRF in the last year. He reports that ever since January he has been dealing with dyspnea and cough. He typically only feels SOB during exertion but does feel it when he is at rest but talking as well. He is compliant with all of his medications and inhalers but feels like they are not working anymore. He reports some chest heaviness and SOB but denies abdominal pain, back pain, nausea, vomiting, diarrhea. He became sad during during examination as he states that he is tired of being sick and having to come back and forth to the hospital.     In the ED, patient was HDS. Afebrile. Hypertensive in the 140s to 160s and tachycardic to the 120s. Other VSS. Satting well on RA though nasal cannula was in place. Labs were notable for Hgb 13.1, Hct 37.8, K 3.2, Mag 1.5, Alb 3.2. BNP mildly elevated at 107. COVID/Flu negative. RIP negative. CXR suggestive of chronic interstitial lung disease. No new large focal consolidation or detrimental change when compared to most recent prior. CTA negative for PE with honeycombing, traction bronchiectasis, and subpleural reticulations consistent with interstitial lung disease. Received IV Magnesium in the ED. Admitted to [facility] secondary to ILD.

## 2024-12-14 PROBLEM — F41.9 ANXIETY: Status: ACTIVE | Noted: 2024-12-14

## 2024-12-14 LAB
ALBUMIN SERPL BCP-MCNC: 3.1 G/DL (ref 3.5–5.2)
ALP SERPL-CCNC: 58 U/L (ref 40–150)
ALT SERPL W/O P-5'-P-CCNC: 11 U/L (ref 10–44)
ANION GAP SERPL CALC-SCNC: 9 MMOL/L (ref 8–16)
AST SERPL-CCNC: 12 U/L (ref 10–40)
BASOPHILS # BLD AUTO: 0 K/UL (ref 0–0.2)
BASOPHILS NFR BLD: 0 % (ref 0–1.9)
BILIRUB SERPL-MCNC: 0.3 MG/DL (ref 0.1–1)
BUN SERPL-MCNC: 15 MG/DL (ref 8–23)
CALCIUM SERPL-MCNC: 9.2 MG/DL (ref 8.7–10.5)
CHLORIDE SERPL-SCNC: 105 MMOL/L (ref 95–110)
CO2 SERPL-SCNC: 23 MMOL/L (ref 23–29)
CREAT SERPL-MCNC: 1.1 MG/DL (ref 0.5–1.4)
DIFFERENTIAL METHOD BLD: ABNORMAL
EOSINOPHIL # BLD AUTO: 0 K/UL (ref 0–0.5)
EOSINOPHIL NFR BLD: 0 % (ref 0–8)
ERYTHROCYTE [DISTWIDTH] IN BLOOD BY AUTOMATED COUNT: 13.6 % (ref 11.5–14.5)
EST. GFR  (NO RACE VARIABLE): >60 ML/MIN/1.73 M^2
GLUCOSE SERPL-MCNC: 410 MG/DL (ref 70–110)
HCT VFR BLD AUTO: 37.7 % (ref 40–54)
HGB BLD-MCNC: 12.8 G/DL (ref 14–18)
IMM GRANULOCYTES # BLD AUTO: 0.02 K/UL (ref 0–0.04)
IMM GRANULOCYTES NFR BLD AUTO: 0.3 % (ref 0–0.5)
LYMPHOCYTES # BLD AUTO: 0.7 K/UL (ref 1–4.8)
LYMPHOCYTES NFR BLD: 11.7 % (ref 18–48)
MAGNESIUM SERPL-MCNC: 2 MG/DL (ref 1.6–2.6)
MCH RBC QN AUTO: 28.4 PG (ref 27–31)
MCHC RBC AUTO-ENTMCNC: 34 G/DL (ref 32–36)
MCV RBC AUTO: 84 FL (ref 82–98)
MONOCYTES # BLD AUTO: 0.1 K/UL (ref 0.3–1)
MONOCYTES NFR BLD: 2.2 % (ref 4–15)
NEUTROPHILS # BLD AUTO: 5.1 K/UL (ref 1.8–7.7)
NEUTROPHILS NFR BLD: 85.8 % (ref 38–73)
NRBC BLD-RTO: 0 /100 WBC
PHOSPHATE SERPL-MCNC: 3 MG/DL (ref 2.7–4.5)
PLATELET # BLD AUTO: 222 K/UL (ref 150–450)
PMV BLD AUTO: 10.2 FL (ref 9.2–12.9)
POCT GLUCOSE: 193 MG/DL (ref 70–110)
POCT GLUCOSE: 197 MG/DL (ref 70–110)
POCT GLUCOSE: 286 MG/DL (ref 70–110)
POCT GLUCOSE: 417 MG/DL (ref 70–110)
POCT GLUCOSE: 472 MG/DL (ref 70–110)
POTASSIUM SERPL-SCNC: 4.5 MMOL/L (ref 3.5–5.1)
PROT SERPL-MCNC: 6.6 G/DL (ref 6–8.4)
RBC # BLD AUTO: 4.51 M/UL (ref 4.6–6.2)
SODIUM SERPL-SCNC: 137 MMOL/L (ref 136–145)
WBC # BLD AUTO: 5.92 K/UL (ref 3.9–12.7)

## 2024-12-14 PROCEDURE — 63600175 PHARM REV CODE 636 W HCPCS: Mod: HCNC

## 2024-12-14 PROCEDURE — 36415 COLL VENOUS BLD VENIPUNCTURE: CPT | Mod: HCNC

## 2024-12-14 PROCEDURE — 83735 ASSAY OF MAGNESIUM: CPT | Mod: HCNC

## 2024-12-14 PROCEDURE — 96372 THER/PROPH/DIAG INJ SC/IM: CPT

## 2024-12-14 PROCEDURE — 85025 COMPLETE CBC W/AUTO DIFF WBC: CPT | Mod: HCNC

## 2024-12-14 PROCEDURE — 96376 TX/PRO/DX INJ SAME DRUG ADON: CPT

## 2024-12-14 PROCEDURE — 94761 N-INVAS EAR/PLS OXIMETRY MLT: CPT | Mod: HCNC

## 2024-12-14 PROCEDURE — 25000003 PHARM REV CODE 250: Mod: HCNC | Performed by: STUDENT IN AN ORGANIZED HEALTH CARE EDUCATION/TRAINING PROGRAM

## 2024-12-14 PROCEDURE — 63700000 PHARM REV CODE 250 ALT 637 W/O HCPCS: Mod: HCNC

## 2024-12-14 PROCEDURE — 99900035 HC TECH TIME PER 15 MIN (STAT): Mod: HCNC

## 2024-12-14 PROCEDURE — 80053 COMPREHEN METABOLIC PANEL: CPT | Mod: HCNC

## 2024-12-14 PROCEDURE — 25000242 PHARM REV CODE 250 ALT 637 W/ HCPCS: Mod: HCNC

## 2024-12-14 PROCEDURE — 27000221 HC OXYGEN, UP TO 24 HOURS: Mod: HCNC

## 2024-12-14 PROCEDURE — 25000003 PHARM REV CODE 250: Mod: HCNC

## 2024-12-14 PROCEDURE — 94640 AIRWAY INHALATION TREATMENT: CPT | Mod: HCNC,XB

## 2024-12-14 PROCEDURE — 94799 UNLISTED PULMONARY SVC/PX: CPT | Mod: HCNC

## 2024-12-14 PROCEDURE — 96372 THER/PROPH/DIAG INJ SC/IM: CPT | Performed by: STUDENT IN AN ORGANIZED HEALTH CARE EDUCATION/TRAINING PROGRAM

## 2024-12-14 PROCEDURE — 84100 ASSAY OF PHOSPHORUS: CPT | Mod: HCNC

## 2024-12-14 PROCEDURE — 63600175 PHARM REV CODE 636 W HCPCS: Mod: HCNC | Performed by: STUDENT IN AN ORGANIZED HEALTH CARE EDUCATION/TRAINING PROGRAM

## 2024-12-14 PROCEDURE — 20600001 HC STEP DOWN PRIVATE ROOM: Mod: HCNC

## 2024-12-14 RX ORDER — INSULIN ASPART 100 [IU]/ML
5 INJECTION, SOLUTION INTRAVENOUS; SUBCUTANEOUS
Status: DISCONTINUED | OUTPATIENT
Start: 2024-12-14 | End: 2024-12-15

## 2024-12-14 RX ORDER — SERTRALINE HYDROCHLORIDE 25 MG/1
25 TABLET, FILM COATED ORAL DAILY
Status: DISCONTINUED | OUTPATIENT
Start: 2024-12-14 | End: 2024-12-16 | Stop reason: HOSPADM

## 2024-12-14 RX ORDER — INSULIN GLARGINE 100 [IU]/ML
15 INJECTION, SOLUTION SUBCUTANEOUS DAILY
Status: DISCONTINUED | OUTPATIENT
Start: 2024-12-14 | End: 2024-12-16 | Stop reason: HOSPADM

## 2024-12-14 RX ORDER — INSULIN ASPART 100 [IU]/ML
0-10 INJECTION, SOLUTION INTRAVENOUS; SUBCUTANEOUS
Status: DISCONTINUED | OUTPATIENT
Start: 2024-12-14 | End: 2024-12-16 | Stop reason: HOSPADM

## 2024-12-14 RX ADMIN — METHYLPREDNISOLONE SODIUM SUCCINATE 40 MG: 40 INJECTION, POWDER, FOR SOLUTION INTRAMUSCULAR; INTRAVENOUS at 08:12

## 2024-12-14 RX ADMIN — ASPIRIN 81 MG: 81 TABLET, COATED ORAL at 09:12

## 2024-12-14 RX ADMIN — METOPROLOL SUCCINATE 100 MG: 100 TABLET, EXTENDED RELEASE ORAL at 09:12

## 2024-12-14 RX ADMIN — BENZONATATE 100 MG: 100 CAPSULE ORAL at 09:12

## 2024-12-14 RX ADMIN — INSULIN ASPART 6 UNITS: 100 INJECTION, SOLUTION INTRAVENOUS; SUBCUTANEOUS at 11:12

## 2024-12-14 RX ADMIN — SERTRALINE HYDROCHLORIDE 25 MG: 25 TABLET ORAL at 12:12

## 2024-12-14 RX ADMIN — IPRATROPIUM BROMIDE AND ALBUTEROL SULFATE 3 ML: 2.5; .5 SOLUTION RESPIRATORY (INHALATION) at 07:12

## 2024-12-14 RX ADMIN — BUDESONIDE 1 MG: 0.5 INHALANT RESPIRATORY (INHALATION) at 07:12

## 2024-12-14 RX ADMIN — FAMOTIDINE 40 MG: 20 TABLET ORAL at 08:12

## 2024-12-14 RX ADMIN — AZITHROMYCIN DIHYDRATE 500 MG: 250 TABLET ORAL at 09:12

## 2024-12-14 RX ADMIN — INSULIN GLARGINE 15 UNITS: 100 INJECTION, SOLUTION SUBCUTANEOUS at 09:12

## 2024-12-14 RX ADMIN — CEFTRIAXONE SODIUM 1 G: 1 INJECTION, POWDER, FOR SOLUTION INTRAMUSCULAR; INTRAVENOUS at 05:12

## 2024-12-14 RX ADMIN — INSULIN ASPART 10 UNITS: 100 INJECTION, SOLUTION INTRAVENOUS; SUBCUTANEOUS at 09:12

## 2024-12-14 RX ADMIN — MYCOPHENOLATE MOFETIL 1000 MG: 250 CAPSULE ORAL at 09:12

## 2024-12-14 RX ADMIN — INSULIN ASPART 5 UNITS: 100 INJECTION, SOLUTION INTRAVENOUS; SUBCUTANEOUS at 06:12

## 2024-12-14 RX ADMIN — BUDESONIDE 1 MG: 0.5 INHALANT RESPIRATORY (INHALATION) at 08:12

## 2024-12-14 RX ADMIN — TAMSULOSIN HYDROCHLORIDE 0.4 MG: 0.4 CAPSULE ORAL at 09:12

## 2024-12-14 RX ADMIN — INSULIN ASPART 5 UNITS: 100 INJECTION, SOLUTION INTRAVENOUS; SUBCUTANEOUS at 04:12

## 2024-12-14 RX ADMIN — METHYLPREDNISOLONE SODIUM SUCCINATE 40 MG: 40 INJECTION, POWDER, FOR SOLUTION INTRAMUSCULAR; INTRAVENOUS at 09:12

## 2024-12-14 RX ADMIN — INSULIN ASPART 2 UNITS: 100 INJECTION, SOLUTION INTRAVENOUS; SUBCUTANEOUS at 09:12

## 2024-12-14 RX ADMIN — INSULIN ASPART 5 UNITS: 100 INJECTION, SOLUTION INTRAVENOUS; SUBCUTANEOUS at 11:12

## 2024-12-14 RX ADMIN — ATORVASTATIN CALCIUM 40 MG: 40 TABLET, FILM COATED ORAL at 08:12

## 2024-12-14 RX ADMIN — SALMETEROL XINAFOATE 1 PUFF: 50 POWDER, METERED ORAL; RESPIRATORY (INHALATION) at 07:12

## 2024-12-14 RX ADMIN — FAMOTIDINE 40 MG: 20 TABLET ORAL at 09:12

## 2024-12-14 RX ADMIN — AMLODIPINE BESYLATE 10 MG: 10 TABLET ORAL at 09:12

## 2024-12-14 RX ADMIN — MYCOPHENOLATE MOFETIL 1000 MG: 250 CAPSULE ORAL at 08:12

## 2024-12-14 RX ADMIN — INSULIN ASPART 2 UNITS: 100 INJECTION, SOLUTION INTRAVENOUS; SUBCUTANEOUS at 04:12

## 2024-12-14 RX ADMIN — SALMETEROL XINAFOATE 1 PUFF: 50 POWDER, METERED ORAL; RESPIRATORY (INHALATION) at 08:12

## 2024-12-14 RX ADMIN — IPRATROPIUM BROMIDE AND ALBUTEROL SULFATE 3 ML: 2.5; .5 SOLUTION RESPIRATORY (INHALATION) at 08:12

## 2024-12-14 RX ADMIN — ENOXAPARIN SODIUM 40 MG: 40 INJECTION SUBCUTANEOUS at 04:12

## 2024-12-14 RX ADMIN — ISOSORBIDE MONONITRATE 30 MG: 30 TABLET, EXTENDED RELEASE ORAL at 09:12

## 2024-12-14 NOTE — ASSESSMENT & PLAN NOTE
Pt complaining of increased anxiety about his disease, and feels he would benefit from medication to help his anxiety    -Start low dose sertraline 25 daily for anxiety

## 2024-12-14 NOTE — PROGRESS NOTES
[facility] - Cardiology [facility] Medicine  Progress Note    Patient Name: Emmanuel Grant  MRN: [id number]  Patient Class: OP- Observation   Admission Date: 12/12/2024  Length of Stay: 0 days  Attending Physician: Caleb Lei MD  Primary Care Provider: Wilfredo De Souza MD        Subjective     Principal Problem:Interstitial lung disease        HPI:  73yoM w/hx of T2DM, non-obstructive CAD, HTN, chronic hypoxic respiratory failure 2/2 ILD (on 2LNC at home; Cellcept for therapy), hx of CVA who presents to the hospital from Pulmonology clinic for SOB. He was being seen in Pulm clinic today when his Pulmonologist noticed that he was having increased work of breathing and worsening respiratory distress. Wanted him to be evaluated in the ED for possible infection and to rule out PE. Patient has had multiple admissions for ACHRF in the last year. He reports that ever since January he has been dealing with dyspnea and cough. He typically only feels SOB during exertion but does feel it when he is at rest but talking as well. He is compliant with all of his medications and inhalers but feels like they are not working anymore. He reports some chest heaviness and SOB but denies abdominal pain, back pain, nausea, vomiting, diarrhea. He became sad during during examination as he states that he is tired of being sick and having to come back and forth to the hospital.     In the ED, patient was HDS. Afebrile. Hypertensive in the 140s to 160s and tachycardic to the 120s. Other VSS. Satting well on RA though nasal cannula was in place. Labs were notable for Hgb 13.1, Hct 37.8, K 3.2, Mag 1.5, Alb 3.2. BNP mildly elevated at 107. COVID/Flu negative. RIP negative. CXR suggestive of chronic interstitial lung disease. No new large focal consolidation or detrimental change when compared to most recent prior. CTA negative for PE with honeycombing, traction bronchiectasis, and subpleural reticulations consistent with  interstitial lung disease. Received IV Magnesium in the ED. Admitted to [facility] secondary to ILD.     Overview/Hospital Course:  73yoM w/hx of T2DM, non-obstructive CAD, HTN, chronic hypoxic respiratory failure 2/2 ILD (on 2LNC at home; Cellcept for therapy), hx of CVA who presents to the hospital from Pulmonology clinic for SOB. He was seen by Dr. Schmitt in Pulmonary clinic who referred him to the ED due to worsening SOB and hypoxia on exertion while on his home O2. On admission, he underwent infectious workup and PE workup. Infectious workup was negative with negative RIP, negative sputum culture, and negative blood cultures. CT PE showed no evidence of PE and showed ground glass opacities in the lungs from his ILD that were similar to previous CT. Pulmonology was consulted and recommended starting him on empiric CAP coverage, increasing his Methylprednisone dose from 40 daily to 40 BID, and adding on scheduled Duo Nebs Q6 while awake.    Interval History: NAEO, the pt has been afebrile HDS and has been saturating well. States that he has still experienced SOB on exertion like going to the bathroom. Blood glucose has also been elevated up to 472 this morning, likely due to increase in steroid dosage. He was given 5 units Aspart around 7am and increased to moderate dose slide scale protocol.    Review of Systems  Objective:     Vital Signs (Most Recent):  Temp: 97.8 °F (36.6 °C) (12/14/24 0514)  Pulse: 85 (12/14/24 0514)  Resp: 18 (12/14/24 0514)  BP: 136/78 (12/14/24 0514)  SpO2: 95 % (12/14/24 0514) Vital Signs (24h Range):  Temp:  [97.5 °F (36.4 °C)-98.5 °F (36.9 °C)] 97.8 °F (36.6 °C)  Pulse:  [79-98] 85  Resp:  [17-20] 18  SpO2:  [93 %-100 %] 95 %  BP: (125-148)/(75-91) 136/78     Weight: 60 kg (132 lb 4.4 oz)  Body mass index is 20.11 kg/m².    Intake/Output Summary (Last 24 hours) at 12/14/2024 0814  Last data filed at 12/14/2024 0514  Gross per 24 hour   Intake 600 ml   Output 1400 ml   Net -800 ml          Physical Exam  Constitutional:       General: He is not in acute distress.     Appearance: Normal appearance. He is ill-appearing.   HENT:      Head: Normocephalic.      Right Ear: External ear normal.      Left Ear: External ear normal.      Mouth/Throat:      Mouth: Mucous membranes are moist.      Pharynx: Oropharynx is clear.   Eyes:      General: No scleral icterus.     Extraocular Movements: Extraocular movements intact.   Cardiovascular:      Rate and Rhythm: Normal rate and regular rhythm.      Pulses: Normal pulses.      Heart sounds: Normal heart sounds. No murmur heard.     No friction rub. No gallop.   Pulmonary:      Effort: Pulmonary effort is normal. No respiratory distress.      Breath sounds: No stridor. Examination of the right-upper field reveals rales. Examination of the left-upper field reveals rales. Examination of the right-lower field reveals rales. Examination of the left-lower field reveals rales. Rales present. No wheezing.      Comments: Crackles auscultated throughout lobes. Trouble breathing while talking, requiring extra breaths after every couple of words  Chest:      Chest wall: No tenderness.   Abdominal:      General: Abdomen is flat. Bowel sounds are normal. There is no distension.      Palpations: Abdomen is soft.      Tenderness: There is no abdominal tenderness.   Musculoskeletal:         General: No swelling.      Right lower leg: No edema.      Left lower leg: No edema.   Skin:     General: Skin is warm.      Coloration: Skin is not jaundiced.      Findings: No erythema or rash.   Neurological:      General: No focal deficit present.      Mental Status: He is alert and oriented to person, place, and time. Mental status is at baseline.   Psychiatric:         Mood and Affect: Mood normal.         Behavior: Behavior normal.         Thought Content: Thought content normal.             Significant Labs: All pertinent labs within the past 24 hours have been reviewed.  CBC:    Recent Labs   Lab 12/12/24  1753 12/13/24  0419 12/14/24  0512   WBC 6.56 5.66 5.92   HGB 13.1* 11.6* 12.8*   HCT 37.8* 35.8* 37.7*    184 222     CMP:   Recent Labs   Lab 12/12/24  1753 12/13/24  0419 12/14/24  0512    137 137   K 3.2* 3.2* 4.5    105 105   CO2 24 23 23   GLU 70 144* 410*   BUN 7* 8 15   CREATININE 0.7 0.8 1.1   CALCIUM 9.0 8.5* 9.2   PROT 6.6 5.7* 6.6   ALBUMIN 3.2* 2.8* 3.1*   BILITOT 0.6 0.5 0.3   ALKPHOS 57 53 58   AST 17 15 12   ALT 10 10 11   ANIONGAP 10 9 9       Significant Imaging: I have reviewed all pertinent imaging results/findings within the past 24 hours.    Assessment and Plan     * Interstitial lung disease exacerbation  74yoM w/hx of ILD (on Cellcept), chronic hypoxic respiratory failure (on 2LNC), HTN, T2DM, HLD, GERD, BPH who presents to the hospital for increased WOB and SOB from Pulm clinic. Tachypneic and hypoxic in the ED, initially requiring additional oxygen. COVID/Flu negative. Respiratory panel negative. VBG 7.38/42.4/20. CTA negative for PE but with honeycombing, traction bronchiectasis, and subpleural reticulations consistent with interstitial lung disease. Admitted to the hospital for AHRF concerning for ILD exacerbation.     While patient does not have new bilateral opacities or other findings on imaging suggestive of ILD exacerbation, he does have increased WOB and clinically uncomfortable on exam. Possibly progression of his ILD. Superimposed lung infection and pulmonary edema were considered but less likely due to no signs of infection and only slight elevation in BNP but no other signs of volume overload. I believe its reasonable to treat patient for ILD exacerbation. Has improved with IV steroids in the past so will start while admitted.       Plan:   -Pulmonology consulted, f/u recs  - increased IV Solumedrol 40mg BID per pulm recs  -Started empiric CAP cvg plus scheduled duo nebs per Pulm  - Continue daily nebulizers and inhalers  -  Continue Cellcept as no sign of infection at this time. Monitor for GI symptoms  - PRN Tessalon pearls for cough  - F/u Bcx   - Consider diuresis if patient is not responsive to steroids.   - Supplemental O2    Anxiety  Pt complaining of increased anxiety about his disease, and feels he would benefit from medication to help his anxiety    -Start low dose sertraline 25 daily for anxiety      Chronic hypoxemic respiratory failure  Patient with Hypoxic Respiratory failure which is Chronic.  he is on home oxygen at 2 LPM. Supplemental oxygen was provided and noted-      .   Signs/symptoms of respiratory failure include- tachypnea and increased work of breathing. Contributing diagnoses includes - Interstitial lung disease Labs and images were reviewed. Patient Has not had a recent ABG. Will treat underlying causes and adjust management of respiratory failure as follows    - See Interstitial lung disease     Chronic HFrEF (heart failure with reduced ejection fraction)  Patient has Systolic (HFrEF) heart failure that is Chronic. On presentation their CHF was well compensated. Most recent BNP and echo results are listed below.  Recent Labs     12/12/24  1753   *       Latest ECHO  Results for orders placed during the hospital encounter of 10/17/24    Echo    Interpretation Summary    Left Ventricle: Regional wall motion abnormalities present. There is normal systolic function with a visually estimated ejection fraction of 55 %. Grade I diastolic dysfunction. federico septal hypokinesis    Right Ventricle: Normal right ventricular cavity size. Systolic function is normal.    Left Atrium: Left atrium is mildly dilated.    IVC/SVC: Normal venous pressure at 3 mmHg.    Current Heart Failure Medications  metoprolol succinate (TOPROL-XL) 24 hr tablet 100 mg, Daily, Oral  , 2 times daily with meals, Oral    Plan  - Euvolemic on exam. Continue home Lasix. Consider IV Lasix if needed  - Monitor strict I&Os and daily weights.     - Place on telemetry  - Low sodium diet   - Cardiology has not been consulted  - The patient's volume status is stable but not at their baseline as indicated by crackles on lung auscultation, dyspnea on exertion ([place]), and shortness of breath      Hyperlipidemia  - Continue home statin      Essential hypertension  Patient's blood pressure range in the last 24 hours was: BP  Min: 136/86  Max: 160/97.The patient's inpatient anti-hypertensive regimen is listed below:  Current Antihypertensives  amLODIPine tablet 10 mg, Daily, Oral  furosemide tablet 20 mg, Daily, Oral  isosorbide mononitrate 24 hr tablet 30 mg, Daily, Oral  metoprolol succinate (TOPROL-XL) 24 hr tablet 50 mg, Daily, Oral    Plan  - BP is controlled, no changes needed to their regimen  - Continue home antihypertensives    GERD (gastroesophageal reflux disease)  - Continue home Pepcid. Hx of Protonix allergy noted in chart.       BPH (benign prostatic hyperplasia)  - Continue home Flomax      Coronary artery disease involving native coronary artery of native heart with angina pectoris  Patient with known CAD s/p  no stent placement , which is controlled Will continue Statin and monitor for S/Sx of angina/ACS. Continue to monitor on telemetry.     Type 2 diabetes mellitus without complication, without long-term current use of insulin  Last Hb A1c   Lab Results   Component Value Date    HGBA1C 8.0 (H) 09/25/2024     Home regimen: Metformin and Glimepride     - Hold oral anti-glycemics while hospitalized  - increase to mod dose SSI  -Added on 10 units long acting insulin daily  - Accu-checks q AC/HS  - Goal glucose 140-180  - Titrate insulin regimen as needed. Use caution with patients with poor kidney function as they will have decreased insulin clearance and can precipitate hypoglycemia.  - Diabetic diet as tolerated  - Hypoglycemia protocol in place  - If blood glucose greater than 300, please ask patient not to eat food or drink anything other than  water until correctional insulin has brought it back below 250  - If patient is NPO, please hold pre-meal Aspart (Daily Detemir/basal insulin is ok to give even if patient is NPO)          VTE Risk Mitigation (From admission, onward)           Ordered     enoxaparin injection 40 mg  Daily         12/12/24 2303     IP VTE HIGH RISK PATIENT  Once         12/12/24 2303     Place sequential compression device  Until discontinued         12/12/24 2303                    Discharge Planning   DERIC: 12/14/2024     Code Status: Full Code   Medical Readiness for Discharge Date:   Discharge Plan A: Home with family                        Ángel Boyle MD  Department of Hospital Medicine   [facility] - Cardiology Stepdown

## 2024-12-14 NOTE — NURSING
Notified that the morning blood glucose was 472.     Med 4 Hospitalist team, Judith MIRANDA, notified of the result at 0649. He states to give the sliding scale dose of 5 units of insulin aspart at this time and recheck the blood sugar in a few hours. Telephone order received, will follow and pass onto the oncoming nurse to reassess.     Will continue to monitor this shift.

## 2024-12-14 NOTE — ASSESSMENT & PLAN NOTE
Last Hb A1c   Lab Results   Component Value Date    HGBA1C 8.0 (H) 09/25/2024     Home regimen: Metformin and Glimepride     - Hold oral anti-glycemics while hospitalized  - increase to mod dose SSI  -Added on 10 units long acting insulin daily  - Accu-checks q AC/HS  - Goal glucose 140-180  - Titrate insulin regimen as needed. Use caution with patients with poor kidney function as they will have decreased insulin clearance and can precipitate hypoglycemia.  - Diabetic diet as tolerated  - Hypoglycemia protocol in place  - If blood glucose greater than 300, please ask patient not to eat food or drink anything other than water until correctional insulin has brought it back below 250  - If patient is NPO, please hold pre-meal Aspart (Daily Detemir/basal insulin is ok to give even if patient is NPO)

## 2024-12-14 NOTE — PLAN OF CARE
AAOX4,VSS,O2 sats>92% on 2LNC. Pt still with persistent cough, tessalon perle given. Glucose in the 400s this morning, lantus added, mealtime insulin given along with additional sliding scale. More controlled the rest of the day. Plan of care discussed with patient. Patient has no complaints of pain/SOB. Discussed medications and care. Patient has no questions at this time. Pt visualised and stable. Call light within reach. Pt resting,bed at lowest position. Pt's family by the bedside. Will continue to monitor through the shift.     Problem: Adult Inpatient Plan of Care  Goal: Plan of Care Review  Outcome: Progressing  Goal: Patient-Specific Goal (Individualized)  Outcome: Progressing  Goal: Absence of Hospital-Acquired Illness or Injury  Outcome: Progressing  Goal: Optimal Comfort and Wellbeing  Outcome: Progressing  Goal: Readiness for Transition of Care  Outcome: Progressing     Problem: Diabetes Comorbidity  Goal: Blood Glucose Level Within Targeted Range  Outcome: Progressing     Problem: Fall Injury Risk  Goal: Absence of Fall and Fall-Related Injury  Outcome: Progressing

## 2024-12-14 NOTE — SUBJECTIVE & OBJECTIVE
Interval History: NAEO, the pt has been afebrile HDS and has been saturating well. States that he has still experienced SOB on exertion like going to the bathroom. Blood glucose has also been elevated up to 472 this morning, likely due to increase in steroid dosage. He was given 5 units Aspart around 7am and increased to moderate dose slide scale protocol.    Review of Systems  Objective:     Vital Signs (Most Recent):  Temp: 97.8 °F (36.6 °C) (12/14/24 0514)  Pulse: 85 (12/14/24 0514)  Resp: 18 (12/14/24 0514)  BP: 136/78 (12/14/24 0514)  SpO2: 95 % (12/14/24 0514) Vital Signs (24h Range):  Temp:  [97.5 °F (36.4 °C)-98.5 °F (36.9 °C)] 97.8 °F (36.6 °C)  Pulse:  [79-98] 85  Resp:  [17-20] 18  SpO2:  [93 %-100 %] 95 %  BP: (125-148)/(75-91) 136/78     Weight: 60 kg (132 lb 4.4 oz)  Body mass index is 20.11 kg/m².    Intake/Output Summary (Last 24 hours) at 12/14/2024 0814  Last data filed at 12/14/2024 0514  Gross per 24 hour   Intake 600 ml   Output 1400 ml   Net -800 ml         Physical Exam  Constitutional:       General: He is not in acute distress.     Appearance: Normal appearance. He is ill-appearing.   HENT:      Head: Normocephalic.      Right Ear: External ear normal.      Left Ear: External ear normal.      Mouth/Throat:      Mouth: Mucous membranes are moist.      Pharynx: Oropharynx is clear.   Eyes:      General: No scleral icterus.     Extraocular Movements: Extraocular movements intact.   Cardiovascular:      Rate and Rhythm: Normal rate and regular rhythm.      Pulses: Normal pulses.      Heart sounds: Normal heart sounds. No murmur heard.     No friction rub. No gallop.   Pulmonary:      Effort: Pulmonary effort is normal. No respiratory distress.      Breath sounds: No stridor. Examination of the right-upper field reveals rales. Examination of the left-upper field reveals rales. Examination of the right-lower field reveals rales. Examination of the left-lower field reveals rales. Rales present. No  wheezing.      Comments: Crackles auscultated throughout lobes. Trouble breathing while talking, requiring extra breaths after every couple of words  Chest:      Chest wall: No tenderness.   Abdominal:      General: Abdomen is flat. Bowel sounds are normal. There is no distension.      Palpations: Abdomen is soft.      Tenderness: There is no abdominal tenderness.   Musculoskeletal:         General: No swelling.      Right lower leg: No edema.      Left lower leg: No edema.   Skin:     General: Skin is warm.      Coloration: Skin is not jaundiced.      Findings: No erythema or rash.   Neurological:      General: No focal deficit present.      Mental Status: He is alert and oriented to person, place, and time. Mental status is at baseline.   Psychiatric:         Mood and Affect: Mood normal.         Behavior: Behavior normal.         Thought Content: Thought content normal.             Significant Labs: All pertinent labs within the past 24 hours have been reviewed.  CBC:   Recent Labs   Lab 12/12/24  1753 12/13/24  0419 12/14/24  0512   WBC 6.56 5.66 5.92   HGB 13.1* 11.6* 12.8*   HCT 37.8* 35.8* 37.7*    184 222     CMP:   Recent Labs   Lab 12/12/24  1753 12/13/24  0419 12/14/24  0512    137 137   K 3.2* 3.2* 4.5    105 105   CO2 24 23 23   GLU 70 144* 410*   BUN 7* 8 15   CREATININE 0.7 0.8 1.1   CALCIUM 9.0 8.5* 9.2   PROT 6.6 5.7* 6.6   ALBUMIN 3.2* 2.8* 3.1*   BILITOT 0.6 0.5 0.3   ALKPHOS 57 53 58   AST 17 15 12   ALT 10 10 11   ANIONGAP 10 9 9       Significant Imaging: I have reviewed all pertinent imaging results/findings within the past 24 hours.

## 2024-12-14 NOTE — NURSING
Per Dr. Boyle, disregard fluid restriction for 24 hours due to pt Cr increased this morning. Diet order changed by MD.

## 2024-12-14 NOTE — ACP (ADVANCE CARE PLANNING)
Advance Care Planning     Date: 12/14/2024    Code Status  In light of the patients advanced and life limiting illness,I engaged the the patient in a voluntary conversation about the patient's preferences for care  at the very end of life. The patient wishes to pursue all life-prolonging measures under those circumstances.  Along those lines, the patient does wish to have CPR or other invasive treatments performed when his heart and/or breathing stops. I communicated to the patient that her status would remain full code.    A total of 10 min was spent on advance care planning, goals of care discussion, emotional support, formulating and communicating prognosis and exploring burden/benefit of various approaches of treatment. This discussion occurred on a fully voluntary basis with the verbal consent of the patient and/or family.          We discussed the terminal and progressive nature his disease.  Patient was tearful.  Continues his wish to pursue all life-prolonging therapies, and seems motivated to participate in any life-prolonging approach.

## 2024-12-14 NOTE — ASSESSMENT & PLAN NOTE
Patient has Systolic (HFrEF) heart failure that is Chronic. On presentation their CHF was well compensated. Most recent BNP and echo results are listed below.  Recent Labs     12/12/24  1753   *       Latest ECHO  Results for orders placed during the hospital encounter of 10/17/24    Echo    Interpretation Summary    Left Ventricle: Regional wall motion abnormalities present. There is normal systolic function with a visually estimated ejection fraction of 55 %. Grade I diastolic dysfunction. federico septal hypokinesis    Right Ventricle: Normal right ventricular cavity size. Systolic function is normal.    Left Atrium: Left atrium is mildly dilated.    IVC/SVC: Normal venous pressure at 3 mmHg.    Current Heart Failure Medications  metoprolol succinate (TOPROL-XL) 24 hr tablet 100 mg, Daily, Oral  , 2 times daily with meals, Oral    Plan  - Euvolemic on exam. Continue home Lasix. Consider IV Lasix if needed  - Monitor strict I&Os and daily weights.    - Place on telemetry  - Low sodium diet   - Cardiology has not been consulted  - The patient's volume status is stable but not at their baseline as indicated by crackles on lung auscultation, dyspnea on exertion ([place]), and shortness of breath

## 2024-12-15 LAB
ALBUMIN SERPL BCP-MCNC: 3.1 G/DL (ref 3.5–5.2)
ALP SERPL-CCNC: 54 U/L (ref 40–150)
ALT SERPL W/O P-5'-P-CCNC: 18 U/L (ref 10–44)
ANION GAP SERPL CALC-SCNC: 11 MMOL/L (ref 8–16)
AST SERPL-CCNC: 15 U/L (ref 10–40)
BACTERIA SPEC AEROBE CULT: NORMAL
BACTERIA SPEC AEROBE CULT: NORMAL
BASOPHILS # BLD AUTO: 0.01 K/UL (ref 0–0.2)
BASOPHILS NFR BLD: 0.1 % (ref 0–1.9)
BILIRUB SERPL-MCNC: 0.3 MG/DL (ref 0.1–1)
BUN SERPL-MCNC: 22 MG/DL (ref 8–23)
CALCIUM SERPL-MCNC: 9.4 MG/DL (ref 8.7–10.5)
CHLORIDE SERPL-SCNC: 104 MMOL/L (ref 95–110)
CO2 SERPL-SCNC: 21 MMOL/L (ref 23–29)
CREAT SERPL-MCNC: 1 MG/DL (ref 0.5–1.4)
DIFFERENTIAL METHOD BLD: ABNORMAL
EOSINOPHIL # BLD AUTO: 0 K/UL (ref 0–0.5)
EOSINOPHIL NFR BLD: 0 % (ref 0–8)
ERYTHROCYTE [DISTWIDTH] IN BLOOD BY AUTOMATED COUNT: 13.7 % (ref 11.5–14.5)
EST. GFR  (NO RACE VARIABLE): >60 ML/MIN/1.73 M^2
GLUCOSE SERPL-MCNC: 281 MG/DL (ref 70–110)
GRAM STN SPEC: NORMAL
HCT VFR BLD AUTO: 36.8 % (ref 40–54)
HGB BLD-MCNC: 12.3 G/DL (ref 14–18)
IMM GRANULOCYTES # BLD AUTO: 0.04 K/UL (ref 0–0.04)
IMM GRANULOCYTES NFR BLD AUTO: 0.4 % (ref 0–0.5)
LYMPHOCYTES # BLD AUTO: 0.7 K/UL (ref 1–4.8)
LYMPHOCYTES NFR BLD: 7.2 % (ref 18–48)
MAGNESIUM SERPL-MCNC: 1.8 MG/DL (ref 1.6–2.6)
MCH RBC QN AUTO: 27.9 PG (ref 27–31)
MCHC RBC AUTO-ENTMCNC: 33.4 G/DL (ref 32–36)
MCV RBC AUTO: 83 FL (ref 82–98)
MONOCYTES # BLD AUTO: 0.2 K/UL (ref 0.3–1)
MONOCYTES NFR BLD: 2.7 % (ref 4–15)
NEUTROPHILS # BLD AUTO: 8.1 K/UL (ref 1.8–7.7)
NEUTROPHILS NFR BLD: 89.6 % (ref 38–73)
NRBC BLD-RTO: 0 /100 WBC
PHOSPHATE SERPL-MCNC: 3.8 MG/DL (ref 2.7–4.5)
PLATELET # BLD AUTO: 253 K/UL (ref 150–450)
PMV BLD AUTO: 10.1 FL (ref 9.2–12.9)
POCT GLUCOSE: 224 MG/DL (ref 70–110)
POCT GLUCOSE: 236 MG/DL (ref 70–110)
POCT GLUCOSE: 250 MG/DL (ref 70–110)
POCT GLUCOSE: 255 MG/DL (ref 70–110)
POCT GLUCOSE: 306 MG/DL (ref 70–110)
POCT GLUCOSE: 309 MG/DL (ref 70–110)
POTASSIUM SERPL-SCNC: 4.3 MMOL/L (ref 3.5–5.1)
PROT SERPL-MCNC: 6.5 G/DL (ref 6–8.4)
RBC # BLD AUTO: 4.41 M/UL (ref 4.6–6.2)
SODIUM SERPL-SCNC: 136 MMOL/L (ref 136–145)
WBC # BLD AUTO: 9.04 K/UL (ref 3.9–12.7)

## 2024-12-15 PROCEDURE — 25000003 PHARM REV CODE 250: Mod: HCNC

## 2024-12-15 PROCEDURE — 63700000 PHARM REV CODE 250 ALT 637 W/O HCPCS: Mod: HCNC

## 2024-12-15 PROCEDURE — 84100 ASSAY OF PHOSPHORUS: CPT | Mod: HCNC

## 2024-12-15 PROCEDURE — 63600175 PHARM REV CODE 636 W HCPCS: Mod: HCNC

## 2024-12-15 PROCEDURE — 20600001 HC STEP DOWN PRIVATE ROOM: Mod: HCNC

## 2024-12-15 PROCEDURE — 27000221 HC OXYGEN, UP TO 24 HOURS: Mod: HCNC

## 2024-12-15 PROCEDURE — 83735 ASSAY OF MAGNESIUM: CPT | Mod: HCNC

## 2024-12-15 PROCEDURE — 85025 COMPLETE CBC W/AUTO DIFF WBC: CPT | Mod: HCNC

## 2024-12-15 PROCEDURE — 36415 COLL VENOUS BLD VENIPUNCTURE: CPT | Mod: HCNC

## 2024-12-15 PROCEDURE — 25000003 PHARM REV CODE 250: Mod: HCNC | Performed by: STUDENT IN AN ORGANIZED HEALTH CARE EDUCATION/TRAINING PROGRAM

## 2024-12-15 PROCEDURE — 94761 N-INVAS EAR/PLS OXIMETRY MLT: CPT | Mod: HCNC

## 2024-12-15 PROCEDURE — 94640 AIRWAY INHALATION TREATMENT: CPT | Mod: HCNC

## 2024-12-15 PROCEDURE — 80053 COMPREHEN METABOLIC PANEL: CPT | Mod: HCNC

## 2024-12-15 PROCEDURE — 99900035 HC TECH TIME PER 15 MIN (STAT): Mod: HCNC

## 2024-12-15 PROCEDURE — 99232 SBSQ HOSP IP/OBS MODERATE 35: CPT | Mod: HCNC,,, | Performed by: INTERNAL MEDICINE

## 2024-12-15 RX ORDER — PREDNISONE 20 MG/1
20 TABLET ORAL DAILY
Status: DISCONTINUED | OUTPATIENT
Start: 2024-12-19 | End: 2024-12-16 | Stop reason: HOSPADM

## 2024-12-15 RX ORDER — INSULIN ASPART 100 [IU]/ML
8 INJECTION, SOLUTION INTRAVENOUS; SUBCUTANEOUS
Status: DISCONTINUED | OUTPATIENT
Start: 2024-12-15 | End: 2024-12-16 | Stop reason: HOSPADM

## 2024-12-15 RX ORDER — PREDNISONE 20 MG/1
40 TABLET ORAL DAILY
Status: DISCONTINUED | OUTPATIENT
Start: 2024-12-16 | End: 2024-12-16 | Stop reason: HOSPADM

## 2024-12-15 RX ORDER — TIMOLOL MALEATE 5 MG/ML
1 SOLUTION/ DROPS OPHTHALMIC DAILY
Status: DISCONTINUED | OUTPATIENT
Start: 2024-12-15 | End: 2024-12-15

## 2024-12-15 RX ORDER — PREDNISONE 10 MG/1
10 TABLET ORAL DAILY
Status: DISCONTINUED | OUTPATIENT
Start: 2024-12-22 | End: 2024-12-16 | Stop reason: HOSPADM

## 2024-12-15 RX ORDER — SACUBITRIL AND VALSARTAN 24; 26 MG/1; MG/1
1 TABLET, FILM COATED ORAL 2 TIMES DAILY
Status: DISCONTINUED | OUTPATIENT
Start: 2024-12-15 | End: 2024-12-16 | Stop reason: HOSPADM

## 2024-12-15 RX ORDER — TIMOLOL MALEATE 5 MG/ML
1 SOLUTION/ DROPS OPHTHALMIC DAILY
Status: COMPLETED | OUTPATIENT
Start: 2024-12-15 | End: 2024-12-15

## 2024-12-15 RX ORDER — BUDESONIDE 0.5 MG/2ML
1 INHALANT ORAL EVERY 12 HOURS PRN
Status: DISCONTINUED | OUTPATIENT
Start: 2024-12-15 | End: 2024-12-16 | Stop reason: HOSPADM

## 2024-12-15 RX ORDER — IPRATROPIUM BROMIDE AND ALBUTEROL SULFATE 2.5; .5 MG/3ML; MG/3ML
3 SOLUTION RESPIRATORY (INHALATION) EVERY 6 HOURS PRN
Status: DISCONTINUED | OUTPATIENT
Start: 2024-12-15 | End: 2024-12-16 | Stop reason: HOSPADM

## 2024-12-15 RX ORDER — FUROSEMIDE 20 MG/1
20 TABLET ORAL DAILY
Status: DISCONTINUED | OUTPATIENT
Start: 2024-12-15 | End: 2024-12-16 | Stop reason: HOSPADM

## 2024-12-15 RX ADMIN — INSULIN ASPART 4 UNITS: 100 INJECTION, SOLUTION INTRAVENOUS; SUBCUTANEOUS at 11:12

## 2024-12-15 RX ADMIN — ENOXAPARIN SODIUM 40 MG: 40 INJECTION SUBCUTANEOUS at 05:12

## 2024-12-15 RX ADMIN — MYCOPHENOLATE MOFETIL 1000 MG: 250 CAPSULE ORAL at 08:12

## 2024-12-15 RX ADMIN — FUROSEMIDE 20 MG: 20 TABLET ORAL at 09:12

## 2024-12-15 RX ADMIN — INSULIN GLARGINE 15 UNITS: 100 INJECTION, SOLUTION SUBCUTANEOUS at 08:12

## 2024-12-15 RX ADMIN — SERTRALINE HYDROCHLORIDE 25 MG: 25 TABLET ORAL at 08:12

## 2024-12-15 RX ADMIN — INSULIN ASPART 8 UNITS: 100 INJECTION, SOLUTION INTRAVENOUS; SUBCUTANEOUS at 04:12

## 2024-12-15 RX ADMIN — INSULIN ASPART 5 UNITS: 100 INJECTION, SOLUTION INTRAVENOUS; SUBCUTANEOUS at 08:12

## 2024-12-15 RX ADMIN — SACUBITRIL AND VALSARTAN 1 TABLET: 24; 26 TABLET, FILM COATED ORAL at 09:12

## 2024-12-15 RX ADMIN — FAMOTIDINE 40 MG: 20 TABLET ORAL at 08:12

## 2024-12-15 RX ADMIN — SALMETEROL XINAFOATE 1 PUFF: 50 POWDER, METERED ORAL; RESPIRATORY (INHALATION) at 08:12

## 2024-12-15 RX ADMIN — INSULIN ASPART 8 UNITS: 100 INJECTION, SOLUTION INTRAVENOUS; SUBCUTANEOUS at 11:12

## 2024-12-15 RX ADMIN — TAMSULOSIN HYDROCHLORIDE 0.4 MG: 0.4 CAPSULE ORAL at 08:12

## 2024-12-15 RX ADMIN — INSULIN ASPART 8 UNITS: 100 INJECTION, SOLUTION INTRAVENOUS; SUBCUTANEOUS at 06:12

## 2024-12-15 RX ADMIN — AMLODIPINE BESYLATE 10 MG: 10 TABLET ORAL at 08:12

## 2024-12-15 RX ADMIN — METOPROLOL SUCCINATE 100 MG: 100 TABLET, EXTENDED RELEASE ORAL at 08:12

## 2024-12-15 RX ADMIN — AZITHROMYCIN DIHYDRATE 500 MG: 250 TABLET ORAL at 08:12

## 2024-12-15 RX ADMIN — SACUBITRIL AND VALSARTAN 1 TABLET: 24; 26 TABLET, FILM COATED ORAL at 08:12

## 2024-12-15 RX ADMIN — METHYLPREDNISOLONE SODIUM SUCCINATE 40 MG: 40 INJECTION, POWDER, FOR SOLUTION INTRAMUSCULAR; INTRAVENOUS at 08:12

## 2024-12-15 RX ADMIN — TIMOLOL MALEATE 1 DROP: 5 SOLUTION OPHTHALMIC at 11:12

## 2024-12-15 RX ADMIN — ISOSORBIDE MONONITRATE 30 MG: 30 TABLET, EXTENDED RELEASE ORAL at 08:12

## 2024-12-15 RX ADMIN — ATORVASTATIN CALCIUM 40 MG: 40 TABLET, FILM COATED ORAL at 08:12

## 2024-12-15 RX ADMIN — ASPIRIN 81 MG: 81 TABLET, COATED ORAL at 08:12

## 2024-12-15 NOTE — NURSING
Home Oxygen Evaluation    Date Performed: 12/15/2024    1) Patient's Home O2 Sat on room air, while at rest: 98        If O2 sats on room air at rest are 88% or below, patient qualifies. No additional testing needed. Document N/A in steps 2 and 3. If 89% or above, complete steps 2.      2) Patient's O2 Sat on room air while exercisin        If O2 sats on room air while exercising remain 89% or above patient does not qualify, no further testing needed Document N/A in step 3. If O2 sats on room air while exercising are 88% or below, continue to step 3.      3) Patient's O2 Sat while exercising on O2: 91 at 2 LPM         (Must show improvement from #2 for patients to qualify)    If O2 sats improve on oxygen, patient qualifies for portable oxygen. If not, the patient does not qualify.      Walk test started at 1158am. Pt 91% on RA at beginning of walk. Pt began to get SOB at 1200. We stopped, now 85%. Placed on 2L. Resumed activity after about 30 seconds. Pt came up to 91% by 1202. Back to room at 1204. Pt up to 95% at 1205.

## 2024-12-15 NOTE — PLAN OF CARE
Patient alert and oriented x4. Patient free from fall and injury. Fall precautions remained in place. VSS. Patient remained on room air. Sats %. NSR- ST on telemetry. Plan of care reviewed with the patient. Blood sugar monitored. Coverage given overnight and this AM. Intake and output monitored. Patient denies pain, chest pain, headaches, or SOB. No acute distress noted. Plan of care continues.         Problem: Adult Inpatient Plan of Care  Goal: Plan of Care Review  Outcome: Progressing  Goal: Patient-Specific Goal (Individualized)  Outcome: Progressing  Goal: Absence of Hospital-Acquired Illness or Injury  Outcome: Progressing  Goal: Optimal Comfort and Wellbeing  Outcome: Progressing  Goal: Readiness for Transition of Care  Outcome: Progressing     Problem: Diabetes Comorbidity  Goal: Blood Glucose Level Within Targeted Range  Outcome: Progressing     Problem: Fall Injury Risk  Goal: Absence of Fall and Fall-Related Injury  Outcome: Progressing

## 2024-12-15 NOTE — SUBJECTIVE & OBJECTIVE
Interval History: No acute events overnight. Pt met at bedside this morning. He continues to report SOB during activities such as walking to his room's bathroom. Reportedly his breathing has remained the same without improvement. No other complaints at this time. Continuing to monitor blood glucose and titrate insulin as needed.     Review of Systems  Objective:     Vital Signs (Most Recent):  Temp: 96.1 °F (35.6 °C) (12/15/24 0735)  Pulse: 72 (12/15/24 0735)  Resp: (!) 28 (12/15/24 0735)  BP: (!) 177/98 (12/15/24 0735)  SpO2: 99 % (12/15/24 0735) Vital Signs (24h Range):  Temp:  [96.1 °F (35.6 °C)-97.9 °F (36.6 °C)] 96.1 °F (35.6 °C)  Pulse:  [] 72  Resp:  [17-28] 28  SpO2:  [92 %-99 %] 99 %  BP: (115-177)/(75-98) 177/98     Weight: 61 kg (134 lb 7.7 oz)  Body mass index is 20.45 kg/m².    Intake/Output Summary (Last 24 hours) at 12/15/2024 0839  Last data filed at 12/15/2024 0832  Gross per 24 hour   Intake 1318 ml   Output 1400 ml   Net -82 ml         Physical Exam  Constitutional:       General: He is not in acute distress.     Appearance: Normal appearance. He is ill-appearing.   HENT:      Head: Normocephalic.      Right Ear: External ear normal.      Left Ear: External ear normal.      Mouth/Throat:      Mouth: Mucous membranes are moist.      Pharynx: Oropharynx is clear.   Eyes:      General: No scleral icterus.     Extraocular Movements: Extraocular movements intact.   Cardiovascular:      Rate and Rhythm: Normal rate and regular rhythm.      Pulses: Normal pulses.      Heart sounds: Normal heart sounds. No murmur heard.     No friction rub. No gallop.   Pulmonary:      Effort: Pulmonary effort is normal. No respiratory distress.      Breath sounds: No stridor. Examination of the right-upper field reveals rales. Examination of the left-upper field reveals rales. Examination of the right-lower field reveals rales. Examination of the left-lower field reveals rales. Rales present. No wheezing.       Comments: Crackles auscultated throughout lobes. Trouble breathing while talking, requiring extra breaths after every couple of words  Chest:      Chest wall: No tenderness.   Abdominal:      General: Abdomen is flat. Bowel sounds are normal. There is no distension.      Palpations: Abdomen is soft.      Tenderness: There is no abdominal tenderness.   Musculoskeletal:         General: No swelling.      Right lower leg: No edema.      Left lower leg: No edema.   Skin:     General: Skin is warm.      Coloration: Skin is not jaundiced.      Findings: No erythema or rash.      Comments: Bilateral clubbing (fingers)   Neurological:      General: No focal deficit present.      Mental Status: He is alert and oriented to person, place, and time. Mental status is at baseline.   Psychiatric:         Mood and Affect: Mood normal.         Behavior: Behavior normal.         Thought Content: Thought content normal.             Significant Labs: All pertinent labs within the past 24 hours have been reviewed.  Recent Lab Results  (Last 5 results in the past 24 hours)        12/15/24  0823   12/15/24  0628   12/15/24  0418   12/14/24  2054   12/14/24  1617        Albumin     3.1           ALP     54           ALT     18           Anion Gap     11           AST     15           Baso #     0.01           Basophil %     0.1           BILIRUBIN TOTAL     0.3  Comment: For infants and newborns, interpretation of results should be based  on gestational age, weight and in agreement with clinical  observations.    Premature Infant recommended reference ranges:  Up to 24 hours.............<8.0 mg/dL  Up to 48 hours............<12.0 mg/dL  3-5 days..................<15.0 mg/dL  6-29 days.................<15.0 mg/dL             BUN     22           Calcium     9.4           Chloride     104           CO2     21           Creatinine     1.0           Differential Method     Automated           eGFR     >60.0           Eos #     0.0            Eos %     0.0           Glucose     281           Gran # (ANC)     8.1           Gran %     89.6           Hematocrit     36.8           Hemoglobin     12.3           Immature Grans (Abs)     0.04  Comment: Mild elevation in immature granulocytes is non specific and   can be seen in a variety of conditions including stress response,   acute inflammation, trauma and pregnancy. Correlation with other   laboratory and clinical findings is essential.             Immature Granulocytes     0.4           Lymph #     0.7           Lymph %     7.2           Magnesium      1.8           MCH     27.9           MCHC     33.4           MCV     83           Mono #     0.2           Mono %     2.7           MPV     10.1           nRBC     0           Phosphorus Level     3.8           Platelet Count     253           POCT Glucose 306   309     236   193       Potassium     4.3           PROTEIN TOTAL     6.5           RBC     4.41           RDW     13.7           Sodium     136           WBC     9.04                                  Significant Imaging: I have reviewed all pertinent imaging results/findings within the past 24 hours.

## 2024-12-15 NOTE — PROGRESS NOTES
[facility] Valadez - Cardiology [facility] Medicine  Progress Note    Patient Name: Emmanuel Grant  MRN: [id number]  Patient Class: IP- Inpatient   Admission Date: 12/12/2024  Length of Stay: 1 days  Attending Physician: Caleb Lei MD  Primary Care Provider: Wilfredo De Souza MD        Subjective     Principal Problem:Interstitial lung disease        HPI:  73yoM w/hx of T2DM, non-obstructive CAD, HTN, chronic hypoxic respiratory failure 2/2 ILD (on 2LNC at home; Cellcept for therapy), hx of CVA who presents to the hospital from Pulmonology clinic for SOB. He was being seen in Pulm clinic today when his Pulmonologist noticed that he was having increased work of breathing and worsening respiratory distress. Wanted him to be evaluated in the ED for possible infection and to rule out PE. Patient has had multiple admissions for ACHRF in the last year. He reports that ever since January he has been dealing with dyspnea and cough. He typically only feels SOB during exertion but does feel it when he is at rest but talking as well. He is compliant with all of his medications and inhalers but feels like they are not working anymore. He reports some chest heaviness and SOB but denies abdominal pain, back pain, nausea, vomiting, diarrhea. He became sad during during examination as he states that he is tired of being sick and having to come back and forth to the hospital.     In the ED, patient was HDS. Afebrile. Hypertensive in the 140s to 160s and tachycardic to the 120s. Other VSS. Satting well on RA though nasal cannula was in place. Labs were notable for Hgb 13.1, Hct 37.8, K 3.2, Mag 1.5, Alb 3.2. BNP mildly elevated at 107. COVID/Flu negative. RIP negative. CXR suggestive of chronic interstitial lung disease. No new large focal consolidation or detrimental change when compared to most recent prior. CTA negative for PE with honeycombing, traction bronchiectasis, and subpleural reticulations consistent with  interstitial lung disease. Received IV Magnesium in the ED. Admitted to [facility] secondary to ILD.     Overview/Hospital Course:  73yoM w/hx of T2DM, non-obstructive CAD, HTN, chronic hypoxic respiratory failure 2/2 ILD (on 2LNC at home; Cellcept for therapy), hx of CVA who presents to the hospital from Pulmonology clinic for SOB. He was seen by Dr. Schmitt in Pulmonary clinic who referred him to the ED due to worsening SOB and hypoxia on exertion while on his home O2. On admission, he underwent infectious workup and PE workup. Infectious workup was negative with negative RIP, negative sputum culture, and negative blood cultures. CT PE showed no evidence of PE and showed ground glass opacities in the lungs from his ILD that were similar to previous CT. Pulmonology was consulted and recommended starting him on empiric CAP coverage, increasing his Methylprednisone dose from 40 daily to 40 BID, and adding on scheduled Duo Nebs Q6 while awake.    Interval History: No acute events overnight. Pt met at bedside this morning. He continues to report SOB during activities such as walking to his room's bathroom. Reportedly his breathing has remained the same without improvement. No other complaints at this time. Continuing to monitor blood glucose and titrate insulin as needed.     Review of Systems  Objective:     Vital Signs (Most Recent):  Temp: 96.1 °F (35.6 °C) (12/15/24 0735)  Pulse: 72 (12/15/24 0735)  Resp: (!) 28 (12/15/24 0735)  BP: (!) 177/98 (12/15/24 0735)  SpO2: 99 % (12/15/24 0735) Vital Signs (24h Range):  Temp:  [96.1 °F (35.6 °C)-97.9 °F (36.6 °C)] 96.1 °F (35.6 °C)  Pulse:  [] 72  Resp:  [17-28] 28  SpO2:  [92 %-99 %] 99 %  BP: (115-177)/(75-98) 177/98     Weight: 61 kg (134 lb 7.7 oz)  Body mass index is 20.45 kg/m².    Intake/Output Summary (Last 24 hours) at 12/15/2024 0839  Last data filed at 12/15/2024 0832  Gross per 24 hour   Intake 1318 ml   Output 1400 ml   Net -82 ml         Physical  Exam  Constitutional:       General: He is not in acute distress.     Appearance: Normal appearance. He is ill-appearing.   HENT:      Head: Normocephalic.      Right Ear: External ear normal.      Left Ear: External ear normal.      Mouth/Throat:      Mouth: Mucous membranes are moist.      Pharynx: Oropharynx is clear.   Eyes:      General: No scleral icterus.     Extraocular Movements: Extraocular movements intact.   Cardiovascular:      Rate and Rhythm: Normal rate and regular rhythm.      Pulses: Normal pulses.      Heart sounds: Normal heart sounds. No murmur heard.     No friction rub. No gallop.   Pulmonary:      Effort: Pulmonary effort is normal. No respiratory distress.      Breath sounds: No stridor. Examination of the right-upper field reveals rales. Examination of the left-upper field reveals rales. Examination of the right-lower field reveals rales. Examination of the left-lower field reveals rales. Rales present. No wheezing.      Comments: Crackles auscultated throughout lobes. Trouble breathing while talking, requiring extra breaths after every couple of words  Chest:      Chest wall: No tenderness.   Abdominal:      General: Abdomen is flat. Bowel sounds are normal. There is no distension.      Palpations: Abdomen is soft.      Tenderness: There is no abdominal tenderness.   Musculoskeletal:         General: No swelling.      Right lower leg: No edema.      Left lower leg: No edema.   Skin:     General: Skin is warm.      Coloration: Skin is not jaundiced.      Findings: No erythema or rash.      Comments: Bilateral clubbing (fingers)   Neurological:      General: No focal deficit present.      Mental Status: He is alert and oriented to person, place, and time. Mental status is at baseline.   Psychiatric:         Mood and Affect: Mood normal.         Behavior: Behavior normal.         Thought Content: Thought content normal.             Significant Labs: All pertinent labs within the past 24 hours  have been reviewed.  Recent Lab Results  (Last 5 results in the past 24 hours)        12/15/24  0823   12/15/24  0628   12/15/24  0418   12/14/24 2054 12/14/24  1617        Albumin     3.1           ALP     54           ALT     18           Anion Gap     11           AST     15           Baso #     0.01           Basophil %     0.1           BILIRUBIN TOTAL     0.3  Comment: For infants and newborns, interpretation of results should be based  on gestational age, weight and in agreement with clinical  observations.    Premature Infant recommended reference ranges:  Up to 24 hours.............<8.0 mg/dL  Up to 48 hours............<12.0 mg/dL  3-5 days..................<15.0 mg/dL  6-29 days.................<15.0 mg/dL             BUN     22           Calcium     9.4           Chloride     104           CO2     21           Creatinine     1.0           Differential Method     Automated           eGFR     >60.0           Eos #     0.0           Eos %     0.0           Glucose     281           Gran # (ANC)     8.1           Gran %     89.6           Hematocrit     36.8           Hemoglobin     12.3           Immature Grans (Abs)     0.04  Comment: Mild elevation in immature granulocytes is non specific and   can be seen in a variety of conditions including stress response,   acute inflammation, trauma and pregnancy. Correlation with other   laboratory and clinical findings is essential.             Immature Granulocytes     0.4           Lymph #     0.7           Lymph %     7.2           Magnesium      1.8           MCH     27.9           MCHC     33.4           MCV     83           Mono #     0.2           Mono %     2.7           MPV     10.1           nRBC     0           Phosphorus Level     3.8           Platelet Count     253           POCT Glucose 306   309     236   193       Potassium     4.3           PROTEIN TOTAL     6.5           RBC     4.41           RDW     13.7           Sodium     136           WBC      9.04                                  Significant Imaging: I have reviewed all pertinent imaging results/findings within the past 24 hours.    Assessment and Plan     * Interstitial lung disease exacerbation  74yoM w/hx of ILD (on Cellcept), chronic hypoxic respiratory failure (on 2LNC), HTN, T2DM, HLD, GERD, BPH who presents to the hospital for increased WOB and SOB from Pulm clinic. Tachypneic and hypoxic in the ED, initially requiring additional oxygen. COVID/Flu negative. Respiratory panel negative. VBG 7.38/42.4/20. CTA negative for PE but with honeycombing, traction bronchiectasis, and subpleural reticulations consistent with interstitial lung disease. Admitted to the hospital for AHRF concerning for ILD exacerbation.     While patient does not have new bilateral opacities or other findings on imaging suggestive of ILD exacerbation, he does have increased WOB and clinically uncomfortable on exam. Possibly progression of his ILD. Superimposed lung infection and pulmonary edema were considered but less likely due to no signs of infection and only slight elevation in BNP but no other signs of volume overload. I believe its reasonable to treat patient for ILD exacerbation. Has improved with IV steroids in the past so will start while admitted.       Plan:   -Pulmonology consulted, f/u recs  - continue IV Solumedrol 40mg BID per pulm recs --> pulmonology suggests prednisone taper w/ Prednisone 40 mg QD x 3-5, 20 mg QD x 3-5, and then 10 mg QD x 3-5   -Started empiric CAP cvg plus scheduled duo nebs per Pulm  - Continue daily nebulizers and inhalers  - Continue Cellcept as no sign of infection at this time. Monitor for GI symptoms  - PRN Tessalon pearls for cough  - F/u Bcx   - Consider diuresis if patient is not responsive to steroids.   - Supplemental O2    Anxiety  Pt complaining of increased anxiety about his disease, and feels he would benefit from medication to help his anxiety    -Start low dose  sertraline 25 daily for anxiety      Chronic hypoxemic respiratory failure  Patient with Hypoxic Respiratory failure which is Chronic.  he is on home oxygen at 2 LPM. Supplemental oxygen was provided and noted-      .   Signs/symptoms of respiratory failure include- tachypnea and increased work of breathing. Contributing diagnoses includes - Interstitial lung disease Labs and images were reviewed. Patient Has not had a recent ABG. Will treat underlying causes and adjust management of respiratory failure as follows    - See Interstitial lung disease     Chronic HFrEF (heart failure with reduced ejection fraction)  Patient has Systolic (HFrEF) heart failure that is Chronic. On presentation their CHF was well compensated. Most recent BNP and echo results are listed below.  Recent Labs     12/12/24  1753   *       Latest ECHO  Results for orders placed during the hospital encounter of 10/17/24    Echo    Interpretation Summary    Left Ventricle: Regional wall motion abnormalities present. There is normal systolic function with a visually estimated ejection fraction of 55 %. Grade I diastolic dysfunction. federico septal hypokinesis    Right Ventricle: Normal right ventricular cavity size. Systolic function is normal.    Left Atrium: Left atrium is mildly dilated.    IVC/SVC: Normal venous pressure at 3 mmHg.    Current Heart Failure Medications  metoprolol succinate (TOPROL-XL) 24 hr tablet 100 mg, Daily, Oral  , 2 times daily with meals, Oral    Plan  - Euvolemic on exam. Continue home Lasix. Consider IV Lasix if needed  - Monitor strict I&Os and daily weights.    - Place on telemetry  - Low sodium diet   - Cardiology has not been consulted  - The patient's volume status is stable but not at their baseline as indicated by crackles on lung auscultation, dyspnea on exertion ([place]), and shortness of breath      Hyperlipidemia  - Continue home statin      Essential hypertension  Patient's blood pressure range in the  last 24 hours was: BP  Min: 115/75  Max: 177/98.The patient's inpatient anti-hypertensive regimen is listed below:  Current Antihypertensives  amLODIPine tablet 10 mg, Daily, Oral  isosorbide mononitrate 24 hr tablet 30 mg, Daily, Oral  metoprolol succinate (TOPROL-XL) 24 hr tablet 100 mg, Daily, Oral  , 2 times daily with meals, Oral    Plan  - BP is controlled, no changes needed to their regimen  - Continue home antihypertensives      GERD (gastroesophageal reflux disease)  - Continue home Pepcid. Hx of Protonix allergy noted in chart.       BPH (benign prostatic hyperplasia)  - Continue home Flomax      Coronary artery disease involving native coronary artery of native heart with angina pectoris  Patient with known CAD s/p  no stent placement , which is controlled Will continue Statin and monitor for S/Sx of angina/ACS. Continue to monitor on telemetry.     Type 2 diabetes mellitus without complication, without long-term current use of insulin  Last Hb A1c   Lab Results   Component Value Date    HGBA1C 8.0 (H) 09/25/2024     Home regimen: Metformin and Glimepride     - Hold oral anti-glycemics while hospitalized  - increase to mod dose SSI  - Added on 15 units long acting insulin daily, consider increasing based on his response to the increase in aspart today. Potentially will need at least 20 lantus if not 20 - 25  - increased aspart w/ meals to 8 units  - Accu-checks q AC/HS  - Goal glucose 140-180  - Titrate insulin regimen as needed. Use caution with patients with poor kidney function as they will have decreased insulin clearance and can precipitate hypoglycemia.  - Diabetic diet as tolerated  - Hypoglycemia protocol in place  - If blood glucose greater than 300, please ask patient not to eat food or drink anything other than water until correctional insulin has brought it back below 250  - If patient is NPO, please hold pre-meal Aspart (Daily Detemir/basal insulin is ok to give even if patient is  NPO)          VTE Risk Mitigation (From admission, onward)           Ordered     enoxaparin injection 40 mg  Daily         12/12/24 2303     IP VTE HIGH RISK PATIENT  Once         12/12/24 2303     Place sequential compression device  Until discontinued         12/12/24 2303                    Discharge Planning   DERIC: 12/14/2024     Code Status: Full Code   Medical Readiness for Discharge Date: TBD  Discharge Plan A: Home with family                        Brandon Wong DO, PGY1  Department of Hospital Medicine   [facility] - Cardiology Stepdown

## 2024-12-15 NOTE — PLAN OF CARE
AAOX4,VSS,O2 sats>92% on 2LNC. BP elevated this morning, restarted on home entresto and lasix. Glucose 200-300, covered with insulin. 6-minute walk test done, see note. Plan of care discussed with patient. Patient has no complaints of pain/SOB. Discussed medications and care. Patient has no questions at this time. Pt visualised and stable. Call light within reach. Pt resting,bed at lowest position. Pt handed off to KANDI Martinez at this time.     Problem: Adult Inpatient Plan of Care  Goal: Plan of Care Review  Outcome: Progressing  Goal: Patient-Specific Goal (Individualized)  Outcome: Progressing  Goal: Absence of Hospital-Acquired Illness or Injury  Outcome: Progressing  Goal: Optimal Comfort and Wellbeing  Outcome: Progressing  Goal: Readiness for Transition of Care  Outcome: Progressing     Problem: Diabetes Comorbidity  Goal: Blood Glucose Level Within Targeted Range  Outcome: Progressing     Problem: Fall Injury Risk  Goal: Absence of Fall and Fall-Related Injury  Outcome: Progressing

## 2024-12-15 NOTE — PLAN OF CARE
Pulmonary Consult Service    Patient seen on rounds and recent studies reviewed with him.  CTA does not show any acute abnormalities.  Micro studies are negative and admit labs unrevealing for acute infection.  BNP minimally elevated.  Today he has resting O2Hb 99% with 2 lpm O2 support.  He remains anxious but seems interested in beginning a trial of pulmonary rehab in order to improve his exercise tolerance.  He may benefit from an increased O2 flow rate during activities if he has exercise induced desaturations.  He can probably have a rapid taper off Prednisone especially in view of the hyperglycemia.  Suggest Prednisone 40 mg QD x 3-5, 20 mg QD x 3-5, and then 10 mg QD x 3-5.  Continue trial with med for chronic anxiety.  Plan for f/u in pulmonary clinic with Dr Schmitt in 3-4 weeks.    CYNDI Chaparro MD  [phone]

## 2024-12-15 NOTE — ASSESSMENT & PLAN NOTE
Patient has Systolic (HFrEF) heart failure that is Chronic. On presentation their CHF was well compensated. Most recent BNP and echo results are listed below.  Recent Labs     12/12/24  1753   *       Latest ECHO  Results for orders placed during the hospital encounter of 10/17/24    Echo    Interpretation Summary    Left Ventricle: Regional wall motion abnormalities present. There is normal systolic function with a visually estimated ejection fraction of 55 %. Grade I diastolic dysfunction. federico septal hypokinesis    Right Ventricle: Normal right ventricular cavity size. Systolic function is normal.    Left Atrium: Left atrium is mildly dilated.    IVC/SVC: Normal venous pressure at 3 mmHg.    Current Heart Failure Medications  metoprolol succinate (TOPROL-XL) 24 hr tablet 100 mg, Daily, Oral  , 2 times daily with meals, Oral    Plan  - Euvolemic on exam. Continue home Lasix. Consider IV Lasix if needed  - Monitor strict I&Os and daily weights.    - Place on telemetry  - Low sodium diet   - Cardiology has not been consulted  - The patient's volume status is stable but not at their baseline as indicated by crackles on lung auscultation, dyspnea on exertion ([place]), and shortness of breath

## 2024-12-15 NOTE — ASSESSMENT & PLAN NOTE
Last Hb A1c   Lab Results   Component Value Date    HGBA1C 8.0 (H) 09/25/2024     Home regimen: Metformin and Glimepride     - Hold oral anti-glycemics while hospitalized  - increase to mod dose SSI  - Added on 15 units long acting insulin daily, consider increasing based on his response to the increase in aspart today. Potentially will need at least 20 lantus if not 20 - 25  - increased aspart w/ meals to 8 units  - Accu-checks q AC/HS  - Goal glucose 140-180  - Titrate insulin regimen as needed. Use caution with patients with poor kidney function as they will have decreased insulin clearance and can precipitate hypoglycemia.  - Diabetic diet as tolerated  - Hypoglycemia protocol in place  - If blood glucose greater than 300, please ask patient not to eat food or drink anything other than water until correctional insulin has brought it back below 250  - If patient is NPO, please hold pre-meal Aspart (Daily Detemir/basal insulin is ok to give even if patient is NPO)

## 2024-12-15 NOTE — ASSESSMENT & PLAN NOTE
Patient's blood pressure range in the last 24 hours was: BP  Min: 115/75  Max: 177/98.The patient's inpatient anti-hypertensive regimen is listed below:  Current Antihypertensives  amLODIPine tablet 10 mg, Daily, Oral  isosorbide mononitrate 24 hr tablet 30 mg, Daily, Oral  metoprolol succinate (TOPROL-XL) 24 hr tablet 100 mg, Daily, Oral  , 2 times daily with meals, Oral    Plan  - BP is controlled, no changes needed to their regimen  - Continue home antihypertensives  - holding entresto and diuretic due to 0.3 bump in creatinine

## 2024-12-16 ENCOUNTER — PATIENT OUTREACH (OUTPATIENT)
Dept: ADMINISTRATIVE | Facility: HOSPITAL | Age: 74
End: 2024-12-16
Payer: MEDICARE

## 2024-12-16 VITALS
DIASTOLIC BLOOD PRESSURE: 72 MMHG | WEIGHT: 136.25 LBS | SYSTOLIC BLOOD PRESSURE: 117 MMHG | HEIGHT: 68 IN | TEMPERATURE: 98 F | OXYGEN SATURATION: 97 % | HEART RATE: 66 BPM | BODY MASS INDEX: 20.65 KG/M2 | RESPIRATION RATE: 16 BRPM

## 2024-12-16 PROBLEM — D63.8 ANEMIA, CHRONIC DISEASE: Status: ACTIVE | Noted: 2024-12-16

## 2024-12-16 LAB
ALBUMIN SERPL BCP-MCNC: 3.2 G/DL (ref 3.5–5.2)
ALP SERPL-CCNC: 60 U/L (ref 40–150)
ALT SERPL W/O P-5'-P-CCNC: 21 U/L (ref 10–44)
ANION GAP SERPL CALC-SCNC: 11 MMOL/L (ref 8–16)
AST SERPL-CCNC: 16 U/L (ref 10–40)
BASOPHILS # BLD AUTO: 0.01 K/UL (ref 0–0.2)
BASOPHILS NFR BLD: 0.1 % (ref 0–1.9)
BILIRUB SERPL-MCNC: 0.4 MG/DL (ref 0.1–1)
BUN SERPL-MCNC: 21 MG/DL (ref 8–23)
CALCIUM SERPL-MCNC: 9 MG/DL (ref 8.7–10.5)
CHLORIDE SERPL-SCNC: 102 MMOL/L (ref 95–110)
CO2 SERPL-SCNC: 25 MMOL/L (ref 23–29)
CREAT SERPL-MCNC: 0.9 MG/DL (ref 0.5–1.4)
DIFFERENTIAL METHOD BLD: ABNORMAL
EOSINOPHIL # BLD AUTO: 0 K/UL (ref 0–0.5)
EOSINOPHIL NFR BLD: 0.2 % (ref 0–8)
ERYTHROCYTE [DISTWIDTH] IN BLOOD BY AUTOMATED COUNT: 13.5 % (ref 11.5–14.5)
EST. GFR  (NO RACE VARIABLE): >60 ML/MIN/1.73 M^2
GLUCOSE SERPL-MCNC: 216 MG/DL (ref 70–110)
HCT VFR BLD AUTO: 37.6 % (ref 40–54)
HGB BLD-MCNC: 12.2 G/DL (ref 14–18)
IMM GRANULOCYTES # BLD AUTO: 0.02 K/UL (ref 0–0.04)
IMM GRANULOCYTES NFR BLD AUTO: 0.2 % (ref 0–0.5)
LYMPHOCYTES # BLD AUTO: 1.2 K/UL (ref 1–4.8)
LYMPHOCYTES NFR BLD: 12.7 % (ref 18–48)
MAGNESIUM SERPL-MCNC: 1.8 MG/DL (ref 1.6–2.6)
MCH RBC QN AUTO: 27.2 PG (ref 27–31)
MCHC RBC AUTO-ENTMCNC: 32.4 G/DL (ref 32–36)
MCV RBC AUTO: 84 FL (ref 82–98)
MONOCYTES # BLD AUTO: 0.8 K/UL (ref 0.3–1)
MONOCYTES NFR BLD: 8.4 % (ref 4–15)
NEUTROPHILS # BLD AUTO: 7.7 K/UL (ref 1.8–7.7)
NEUTROPHILS NFR BLD: 78.4 % (ref 38–73)
NRBC BLD-RTO: 0 /100 WBC
PHOSPHATE SERPL-MCNC: 3 MG/DL (ref 2.7–4.5)
PLATELET # BLD AUTO: 303 K/UL (ref 150–450)
PMV BLD AUTO: 10.4 FL (ref 9.2–12.9)
POCT GLUCOSE: 229 MG/DL (ref 70–110)
POTASSIUM SERPL-SCNC: 3.5 MMOL/L (ref 3.5–5.1)
PROT SERPL-MCNC: 6.5 G/DL (ref 6–8.4)
RBC # BLD AUTO: 4.49 M/UL (ref 4.6–6.2)
SODIUM SERPL-SCNC: 138 MMOL/L (ref 136–145)
WBC # BLD AUTO: 9.76 K/UL (ref 3.9–12.7)

## 2024-12-16 PROCEDURE — 25000003 PHARM REV CODE 250: Mod: HCNC

## 2024-12-16 PROCEDURE — 85025 COMPLETE CBC W/AUTO DIFF WBC: CPT | Mod: HCNC

## 2024-12-16 PROCEDURE — 84100 ASSAY OF PHOSPHORUS: CPT | Mod: HCNC

## 2024-12-16 PROCEDURE — 63600175 PHARM REV CODE 636 W HCPCS: Mod: HCNC

## 2024-12-16 PROCEDURE — 25000003 PHARM REV CODE 250: Mod: HCNC | Performed by: STUDENT IN AN ORGANIZED HEALTH CARE EDUCATION/TRAINING PROGRAM

## 2024-12-16 PROCEDURE — 80053 COMPREHEN METABOLIC PANEL: CPT | Mod: HCNC

## 2024-12-16 PROCEDURE — 83735 ASSAY OF MAGNESIUM: CPT | Mod: HCNC

## 2024-12-16 PROCEDURE — 94761 N-INVAS EAR/PLS OXIMETRY MLT: CPT | Mod: HCNC

## 2024-12-16 PROCEDURE — 36415 COLL VENOUS BLD VENIPUNCTURE: CPT | Mod: HCNC

## 2024-12-16 PROCEDURE — 94640 AIRWAY INHALATION TREATMENT: CPT | Mod: HCNC

## 2024-12-16 RX ORDER — PEN NEEDLE, DIABETIC 30 GX3/16"
1 NEEDLE, DISPOSABLE MISCELLANEOUS DAILY
Qty: 100 EACH | Refills: 0 | Status: SHIPPED | OUTPATIENT
Start: 2024-12-16

## 2024-12-16 RX ORDER — LATANOPROST/PF 0.005 %
DROPS OPHTHALMIC (EYE)
COMMUNITY

## 2024-12-16 RX ORDER — LANCETS 33 GAUGE
EACH MISCELLANEOUS 3 TIMES DAILY
COMMUNITY
Start: 2024-08-06

## 2024-12-16 RX ORDER — PREDNISONE 10 MG/1
TABLET ORAL
Qty: 35 TABLET | Refills: 0 | Status: SHIPPED | OUTPATIENT
Start: 2024-12-16 | End: 2024-12-16

## 2024-12-16 RX ORDER — METFORMIN HYDROCHLORIDE 500 MG/1
TABLET ORAL
Status: ON HOLD | COMMUNITY
End: 2024-12-16 | Stop reason: HOSPADM

## 2024-12-16 RX ORDER — INSULIN ASPART 100 [IU]/ML
10 INJECTION, SOLUTION INTRAVENOUS; SUBCUTANEOUS
Status: CANCELLED | OUTPATIENT
Start: 2024-12-16

## 2024-12-16 RX ORDER — AMLODIPINE BESYLATE 10 MG/1
TABLET ORAL
Status: ON HOLD | COMMUNITY
End: 2024-12-16 | Stop reason: HOSPADM

## 2024-12-16 RX ORDER — GLIMEPIRIDE 2 MG/1
2 TABLET ORAL 2 TIMES DAILY
Qty: 180 TABLET | Refills: 0 | Status: SHIPPED | OUTPATIENT
Start: 2024-12-16

## 2024-12-16 RX ORDER — PREDNISONE 10 MG/1
TABLET ORAL
Qty: 35 TABLET | Refills: 0 | Status: SHIPPED | OUTPATIENT
Start: 2024-12-16 | End: 2024-12-31

## 2024-12-16 RX ORDER — INSULIN GLARGINE 100 [IU]/ML
24 INJECTION, SOLUTION SUBCUTANEOUS DAILY
Status: CANCELLED | OUTPATIENT
Start: 2024-12-16

## 2024-12-16 RX ORDER — INSULIN GLARGINE 100 [IU]/ML
12 INJECTION, SOLUTION SUBCUTANEOUS DAILY
Qty: 6 ML | Refills: 0 | Status: SHIPPED | OUTPATIENT
Start: 2024-12-16 | End: 2024-12-30 | Stop reason: SDUPTHER

## 2024-12-16 RX ORDER — SERTRALINE HYDROCHLORIDE 25 MG/1
25 TABLET, FILM COATED ORAL DAILY
Qty: 30 TABLET | Refills: 0 | Status: SHIPPED | OUTPATIENT
Start: 2024-12-16 | End: 2025-01-15

## 2024-12-16 RX ADMIN — FUROSEMIDE 20 MG: 20 TABLET ORAL at 09:12

## 2024-12-16 RX ADMIN — SERTRALINE HYDROCHLORIDE 25 MG: 25 TABLET ORAL at 09:12

## 2024-12-16 RX ADMIN — AMLODIPINE BESYLATE 10 MG: 10 TABLET ORAL at 09:12

## 2024-12-16 RX ADMIN — PREDNISONE 40 MG: 20 TABLET ORAL at 09:12

## 2024-12-16 RX ADMIN — SALMETEROL XINAFOATE 1 PUFF: 50 POWDER, METERED ORAL; RESPIRATORY (INHALATION) at 07:12

## 2024-12-16 RX ADMIN — INSULIN ASPART 4 UNITS: 100 INJECTION, SOLUTION INTRAVENOUS; SUBCUTANEOUS at 11:12

## 2024-12-16 RX ADMIN — METOPROLOL SUCCINATE 100 MG: 100 TABLET, EXTENDED RELEASE ORAL at 09:12

## 2024-12-16 RX ADMIN — INSULIN GLARGINE 15 UNITS: 100 INJECTION, SOLUTION SUBCUTANEOUS at 09:12

## 2024-12-16 RX ADMIN — INSULIN ASPART 8 UNITS: 100 INJECTION, SOLUTION INTRAVENOUS; SUBCUTANEOUS at 09:12

## 2024-12-16 RX ADMIN — SACUBITRIL AND VALSARTAN 1 TABLET: 24; 26 TABLET, FILM COATED ORAL at 09:12

## 2024-12-16 RX ADMIN — INSULIN ASPART 8 UNITS: 100 INJECTION, SOLUTION INTRAVENOUS; SUBCUTANEOUS at 11:12

## 2024-12-16 RX ADMIN — MYCOPHENOLATE MOFETIL 1000 MG: 250 CAPSULE ORAL at 09:12

## 2024-12-16 RX ADMIN — ASPIRIN 81 MG: 81 TABLET, COATED ORAL at 09:12

## 2024-12-16 RX ADMIN — POTASSIUM BICARBONATE 40 MEQ: 391 TABLET, EFFERVESCENT ORAL at 09:12

## 2024-12-16 RX ADMIN — FAMOTIDINE 40 MG: 20 TABLET ORAL at 09:12

## 2024-12-16 RX ADMIN — ISOSORBIDE MONONITRATE 30 MG: 30 TABLET, EXTENDED RELEASE ORAL at 09:12

## 2024-12-16 RX ADMIN — TAMSULOSIN HYDROCHLORIDE 0.4 MG: 0.4 CAPSULE ORAL at 09:12

## 2024-12-16 NOTE — DISCHARGE SUMMARY
Marito Valadez - Cardiology [facility] Medicine  Discharge Summary      Patient Name: Emmanuel Grant  MRN: [id number]  ALVARO: [id number]  Patient Class: IP- Inpatient  Admission Date: 12/12/2024  Hospital Length of Stay: 2 days  Discharge Date and Time:  12/16/2024 1:53 PM  Attending Physician: Caleb Lei MD   Discharging Provider: Mawadah Samad, MD  Primary Care Provider: Wilfredo De Souza MD  [facility] Medicine Team: [facility] 4 Mawadah Samad, MD  Primary Care Team: [facility] 4    HPI:   73yoM w/hx of T2DM, non-obstructive CAD, HTN, chronic hypoxic respiratory failure 2/2 ILD (on 2LNC at home; Cellcept for therapy), hx of CVA who presents to the hospital from Pulmonology clinic for SOB. He was being seen in Pulm clinic today when his Pulmonologist noticed that he was having increased work of breathing and worsening respiratory distress. Wanted him to be evaluated in the ED for possible infection and to rule out PE. Patient has had multiple admissions for [facility] in the last year. He reports that ever since January he has been dealing with dyspnea and cough. He typically only feels SOB during exertion but does feel it when he is at rest but talking as well. He is compliant with all of his medications and inhalers but feels like they are not working anymore. He reports some chest heaviness and SOB but denies abdominal pain, back pain, nausea, vomiting, diarrhea. He became sad during during examination as he states that he is tired of being sick and having to come back and forth to the hospital.     In the ED, patient was HDS. Afebrile. Hypertensive in the 140s to 160s and tachycardic to the 120s. Other VSS. Satting well on RA though nasal cannula was in place. Labs were notable for Hgb 13.1, Hct 37.8, K 3.2, Mag 1.5, Alb 3.2. BNP mildly elevated at 107. COVID/Flu negative. RIP negative. CXR suggestive of chronic interstitial lung disease. No new large focal consolidation or detrimental change when  compared to most recent prior. CTA negative for PE with honeycombing, traction bronchiectasis, and subpleural reticulations consistent with interstitial lung disease. Received IV Magnesium in the ED. Admitted to [facility] secondary to ILD.     * No surgery found *      Hospital Course:   73yoM w/hx of T2DM, non-obstructive CAD, HTN, chronic hypoxic respiratory failure 2/2 ILD (on 2LNC at home; Cellcept for therapy), hx of CVA who presents to the hospital from Pulmonology clinic for SOB. He was seen by Dr. Schmitt in Pulmonary clinic who referred him to the ED due to worsening SOB and hypoxia on exertion while on his home O2. On admission, he underwent infectious workup and PE workup. Infectious workup was negative with negative RIP, negative sputum culture, and negative blood cultures. CT PE showed no evidence of PE and showed ground glass opacities in the lungs from his ILD that were similar to previous CT. Pulmonology was consulted and recommended starting him on empiric CAP coverage, increasing his Methylprednisone dose from 40 daily to 40 BID, and adding on scheduled Duo Nebs Q6 while awake. Patient required new 6MWT, now requiring 4L. Antibiotics stopped shortly after. Patient to be discharged on steroid taper per pulmonology. Given his sugars being poorly controlled, patient discharged with long-acting insulin. Instructed to inject 12 units of Lantus daily and continue taking his metformin for duration of steroid taper. Was told to hold glimperide until completion of the steroid taper. Discharged with 30 day supply of Zoloft. PCP hospital f/u referral placed.      Goals of Care Treatment Preferences:  Code Status: Full Code      SDOH Screening:  The patient was screened for utility difficulties, food insecurity, transport difficulties, housing insecurity, and interpersonal safety and there were no concerns identified this admission.     Consults:   Consults (From admission, onward)          Status Ordering Provider      Inpatient consult to Pulmonology  Once        Provider:  (Not yet assigned)    Completed LEOBARDO ONTIVEROS            * Interstitial lung disease exacerbation  74yoM w/hx of ILD (on Cellcept), chronic hypoxic respiratory failure (on 2LNC), HTN, T2DM, HLD, GERD, BPH who presents to the hospital for increased WOB and SOB from Pulm clinic. Tachypneic and hypoxic in the ED, initially requiring additional oxygen. COVID/Flu negative. Respiratory panel negative. VBG 7.38/42.4/20. CTA negative for PE but with honeycombing, traction bronchiectasis, and subpleural reticulations consistent with interstitial lung disease. Admitted to the hospital for AHRF concerning for ILD exacerbation.     While patient does not have new bilateral opacities or other findings on imaging suggestive of ILD exacerbation, he does have increased WOB and clinically uncomfortable on exam. Possibly progression of his ILD. Superimposed lung infection and pulmonary edema were considered but less likely due to no signs of infection and only slight elevation in BNP but no other signs of volume overload. I believe its reasonable to treat patient for ILD exacerbation. Has improved with IV steroids in the past so will start while admitted.       Plan:   -Pulmonology consulted, f/u recs  - increased IV Solumedrol 40mg BID per pulm recs  -Started empiric CAP cvg plus scheduled duo nebs per Pulm  - Continue daily nebulizers and inhalers  - Continue Cellcept as no sign of infection at this time. Monitor for GI symptoms  - PRN Tessalon pearls for cough  - F/u Bcx   - Consider diuresis if patient is not responsive to steroids.   - Supplemental O2    Anxiety  Pt complaining of increased anxiety about his disease, and feels he would benefit from medication to help his anxiety    -Start low dose sertraline 25 daily for anxiety      Chronic hypoxemic respiratory failure  Patient with Hypoxic Respiratory failure which is Chronic.  he is on home oxygen at 2 LPM. Supplemental  oxygen was provided and noted-      .   Signs/symptoms of respiratory failure include- tachypnea and increased work of breathing. Contributing diagnoses includes - Interstitial lung disease Labs and images were reviewed. Patient Has not had a recent ABG. Will treat underlying causes and adjust management of respiratory failure as follows    - See Interstitial lung disease     Chronic HFrEF (heart failure with reduced ejection fraction)  Patient has Systolic (HFrEF) heart failure that is Chronic. On presentation their CHF was well compensated. Most recent BNP and echo results are listed below.  Recent Labs     12/12/24  1753   *       Latest ECHO  Results for orders placed during the hospital encounter of 10/17/24    Echo    Interpretation Summary    Left Ventricle: Regional wall motion abnormalities present. There is normal systolic function with a visually estimated ejection fraction of 55 %. Grade I diastolic dysfunction. federico septal hypokinesis    Right Ventricle: Normal right ventricular cavity size. Systolic function is normal.    Left Atrium: Left atrium is mildly dilated.    IVC/SVC: Normal venous pressure at 3 mmHg.    Current Heart Failure Medications  metoprolol succinate (TOPROL-XL) 24 hr tablet 100 mg, Daily, Oral  , 2 times daily with meals, Oral    Plan  - Euvolemic on exam. Continue home Lasix. Consider IV Lasix if needed  - Monitor strict I&Os and daily weights.    - Place on telemetry  - Low sodium diet   - Cardiology has not been consulted  - The patient's volume status is stable but not at their baseline as indicated by crackles on lung auscultation, dyspnea on exertion (BOST[place]), and shortness of breath      Hyperlipidemia  - Continue home statin      Essential hypertension  Patient's blood pressure range in the last 24 hours was: BP  Min: 115/75  Max: 177/98.The patient's inpatient anti-hypertensive regimen is listed below:  Current Antihypertensives  amLODIPine tablet 10 mg, Daily,  Oral  isosorbide mononitrate 24 hr tablet 30 mg, Daily, Oral  metoprolol succinate (TOPROL-XL) 24 hr tablet 100 mg, Daily, Oral  , 2 times daily with meals, Oral    Plan  - BP is controlled, no changes needed to their regimen  - Continue home antihypertensives  - holding entresto and diuretic due to 0.3 bump in creatinine     GERD (gastroesophageal reflux disease)  - Continue home Pepcid. Hx of Protonix allergy noted in chart.       BPH (benign prostatic hyperplasia)  - Continue home Flomax      Coronary artery disease involving native coronary artery of native heart with angina pectoris  Patient with known CAD s/p  no stent placement , which is controlled Will continue Statin and monitor for S/Sx of angina/ACS. Continue to monitor on telemetry.     Type 2 diabetes mellitus without complication, without long-term current use of insulin  Last Hb A1c   Lab Results   Component Value Date    HGBA1C 8.0 (H) 09/25/2024     Home regimen: Metformin and Glimepride     - Hold oral anti-glycemics while hospitalized  - increase to mod dose SSI  - Added on 15 units long acting insulin daily, consider increasing based on his response to the increase in aspart today. Potentially will need at least 20 lantus if not 20 - 25  - increased aspart w/ meals to 8 units  - Accu-checks q AC/HS  - Goal glucose 140-180  - Titrate insulin regimen as needed. Use caution with patients with poor kidney function as they will have decreased insulin clearance and can precipitate hypoglycemia.  - Diabetic diet as tolerated  - Hypoglycemia protocol in place  - If blood glucose greater than 300, please ask patient not to eat food or drink anything other than water until correctional insulin has brought it back below 250  - If patient is NPO, please hold pre-meal Aspart (Daily Detemir/basal insulin is ok to give even if patient is NPO)          Final Active Diagnoses:    Diagnosis Date Noted POA    PRINCIPAL PROBLEM:  Interstitial lung disease  "exacerbation [J84.9] 02/28/2024 Yes    Anemia, chronic disease [D63.8] 12/16/2024 Unknown    Anxiety [F41.9] 12/14/2024 Unknown    Chronic hypoxemic respiratory failure [J96.11] 12/10/2024 Yes    Hypokalemia [E87.6] 11/08/2024 Yes    H/O: CVA (cerebrovascular accident) [Z86.73] 08/07/2024 Not Applicable    Chronic HFrEF (heart failure with reduced ejection fraction) [I50.22] 05/01/2024 Yes    GERD (gastroesophageal reflux disease) [K21.9] 04/20/2024 Yes    BPH (benign prostatic hyperplasia) [N40.0] 02/28/2024 Yes    Coronary artery disease involving native coronary artery of native heart with angina pectoris [I25.119] 02/28/2024 Yes    Type 2 diabetes mellitus without complication, without long-term current use of insulin [E11.9] 10/08/2021 Yes    Essential hypertension [I10] 05/29/2020 Yes    Hyperlipidemia [E78.5] 05/29/2020 Yes      Problems Resolved During this Admission:       Discharged Condition: good    Disposition: Home or Self Care    Follow Up:      Patient Instructions:      OXYGEN FOR HOME USE     Order Specific Question Answer Comments   Liter Flow 4    Duration Continuous    Qualifying Test Performed at: Activity    Oxygen saturation at rest 93    Oxygen saturation with activity 87    Oxygen saturation with activity on oxygen 94    Portable mode: pulse dose acceptable    Mode: Portable concentrator    Route nasal cannula    Device: home concentrator with portable concentrator    Length of need (in months): 99 mos    Patient condition with qualifying saturation Other - List qualifying diagnosis and code    Select a diagnosis & list the code in the comments Interstitial lung disease [465917]    Height: 5' 8" (1.727 m)    Weight: 61.8 kg (136 lb 3.9 oz)    Alternative treatment measures have been tried or considered and deemed clinically ineffective. Yes      Ambulatory referral/consult to Pulmonary Rehab   Standing Status: Future   Referral Priority: Routine Referral Type: Consultation   Referral " "Reason: Specialty Services Required   Requested Specialty: Pulmonary Disease   Number of Visits Requested: 1       Significant Diagnostic Studies: Labs: All labs within the past 24 hours have been reviewed    Pending Diagnostic Studies:       None           Medications:  Reconciled Home Medications:      Medication List        START taking these medications      LANTUS SOLOSTAR U-100 INSULIN 100 unit/mL (3 mL) Inpn pen  Generic drug: insulin glargine U-100 (Lantus)  Inject 12 Units into the skin once daily. Stop using this after you complete prednisone taper.  (Discard pen 28 days after initial use)     pen needle, diabetic 32 gauge x 5/32" Ndle  1 each by Misc.(Non-Drug; Combo Route) route once daily.     sertraline 25 MG tablet  Commonly known as: ZOLOFT  Take 1 tablet (25 mg total) by mouth once daily.            CHANGE how you take these medications      amLODIPine 10 MG tablet  Commonly known as: NORVASC  Take 1 tablet (10 mg total) by mouth once daily.  What changed: Another medication with the same name was removed. Continue taking this medication, and follow the directions you see here.     glimepiride 2 MG tablet  Commonly known as: AMARYL  Take 1 tablet (2 mg total) by mouth 2 (two) times a day. Take with meals  Resume taking after completion of steroid taper  What changed: additional instructions     metFORMIN 1000 MG tablet  Commonly known as: GLUCOPHAGE  Take 1 tablet (1,000 mg total) by mouth 2 (two) times daily with meals.  What changed: Another medication with the same name was removed. Continue taking this medication, and follow the directions you see here.     predniSONE 10 MG tablet  Commonly known as: DELTASONE  Take 4 tablets (40 mg total) by mouth once daily for 5 days, THEN 2 tablets (20 mg total) once daily for 5 days, THEN 1 tablet (10 mg total) once daily for 5 days.  Start taking on: December 16, 2024  What changed: See the new instructions.            CONTINUE taking these medications  "     albuterol-ipratropium 2.5 mg-0.5 mg/3 mL nebulizer solution  Commonly known as: DUO-NEB  Take 3 mLs by nebulization every 6 (six) hours as needed for Wheezing. Rescue     arformoteroL 15 mcg/2 mL Nebu  Commonly known as: BROVANA  Take 2 mLs (15 mcg total) by nebulization 2 (two) times daily. Controller     aspirin 81 MG Chew  Take 81 mg by mouth once daily.     atorvastatin 40 MG tablet  Commonly known as: LIPITOR  Take 1 tablet (40 mg total) by mouth every evening.     budesonide 0.5 mg/2 mL nebulizer solution  Commonly known as: PULMICORT  Take 4 mLs (1 mg total) by nebulization 2 (two) times daily. Controller     carvediloL 25 MG tablet  Commonly known as: COREG  Take 25 mg by mouth 2 (two) times daily with meals.     cholecalciferol (vitamin D3) 50 mcg (2,000 unit) Cap capsule  Commonly known as: VITAMIN D3  Take 1 capsule by mouth once daily.     ENTRESTO 24-26 mg per tablet  Generic drug: sacubitriL-valsartan  Take 1 tablet by mouth 2 (two) times daily.     FREESTYLE RAMBO 3 PLUS SENSOR Blanche  Generic drug: blood-glucose sensor  Change every 15 days     furosemide 20 MG tablet  Commonly known as: LASIX  Take 1 tablet (20 mg total) by mouth once daily.     latanoprost (PF) 0.005 % Drop  1 drop into affected eye in the evening Ophthalmic Once a day     metoprolol succinate 50 MG 24 hr tablet  Commonly known as: TOPROL-XL  Take 1 tablet (50 mg total) by mouth once daily.     mirabegron 25 mg Tb24 ER tablet  Commonly known as: MYRBETRIQ  Take 1 tablet (25 mg total) by mouth once daily.     mycophenolate 250 mg Cap  Commonly known as: CELLCEPT  Take 4 capsules (1,000 mg total) by mouth 2 (two) times daily.     nitroGLYCERIN 0.3 MG SL tablet  Commonly known as: NITROSTAT  Place 1 tablet (0.3 mg total) under the tongue every 5 (five) minutes as needed for Chest pain.     omeprazole 40 MG capsule  Commonly known as: PRILOSEC  Take 40 mg by mouth once daily.     ONETOUCH DELICA PLUS LANCET 33 gauge Misc  Generic  drug: lancets  Apply topically 3 (three) times daily.     sucralfate 1 gram tablet  Commonly known as: CARAFATE  Take 1 tablet (1 g total) by mouth 4 (four) times daily before meals and nightly.            STOP taking these medications      famotidine 40 MG tablet  Commonly known as: PEPCID              Indwelling Lines/Drains at time of discharge:   Lines/Drains/Airways       None                   Time spent on the discharge of patient: 35 minutes         Mawadah Samad, MD  Department of Hospital Medicine  [facility] - Cardiology Stepdown

## 2024-12-16 NOTE — DISCHARGE INSTRUCTIONS
Place inject 12 units of lantus daily until steroid taper is complete.     Continue taking metformin with the lantus until steroid taper is complete    Hold glimperide until steroid taper is complete    Follow up PCP appt scheduled December 27, 2024 @ 9:30am     Call for Dr. Schmitt's office and schedule pulmonology appt to discuss anti-fibrotic therapy

## 2024-12-16 NOTE — CARE UPDATE
Unit NAIF Care Support Interaction      I have reviewed the chart of Emmanuel Grant who is hospitalized for Interstitial lung disease. The patient is currently located in the following unit: CSU      I have seen and examined the patient and provided the following support:     Patient experience rounds - positive experience reported       Tiana Luz PA-C  Unit Based NAIF

## 2024-12-16 NOTE — NURSING
Home Oxygen Evaluation    Date Performed: 2024    1) Patient's Home O2 Sat on room air, while at rest: 93        If O2 sats on room air at rest are 88% or below, patient qualifies. No additional testing needed. Document N/A in steps 2 and 3. If 89% or above, complete steps 2.      2) Patient's O2 Sat on room air while exercisin        If O2 sats on room air while exercising remain 89% or above patient does not qualify, no further testing needed Document N/A in step 3. If O2 sats on room air while exercising are 88% or below, continue to step 3.      3) Patient's O2 Sat while exercising on O2: 94% at 4L LPM         (Must show improvement from #2 for patients to qualify)    If O2 sats improve on oxygen, patient qualifies for portable oxygen. If not, the patient does not qualify.

## 2024-12-16 NOTE — PLAN OF CARE
PLAN OF CARE NOTE     Fall bundle in place. Pt. Remained free from falls/trauma/injuries. No complaints of CP/ SOB. VSS. A&Ox4. Afebrile. TeleJUAN MIGUEL-NSR. 2L NC, same level of home O2. Pt. denies pain this shift. Pt. Reports bilateral eye discomfort & reports being on eyedrops @ home for glaucoma. Team contacted & approved Timolol eye drops bilaterally scheduled; given. Pts' BG this shift was 224, coverage given per MAR orders. Pt. Awaiting poss. pulm. Rehab & is to f/u outpt. @ the pulm. Clinic in 3-4 weeks. The POC reviewed w/ pt., questions were answered & encouraged. No further concerns at this time.

## 2024-12-16 NOTE — CARE UPDATE
I have reviewed the chart of Emmanuel Grant who is hospitalized for the following:    Active Hospital Problems    Diagnosis    *Interstitial lung disease exacerbation    Anemia, chronic disease     Hgb at baseline of 11-12      Anxiety    Chronic hypoxemic respiratory failure    Hypokalemia         H/O: CVA (cerebrovascular accident)     Continue ASA and statin       Chronic HFrEF (heart failure with reduced ejection fraction)    GERD (gastroesophageal reflux disease)    BPH (benign prostatic hyperplasia)    Coronary artery disease involving native coronary artery of native heart with angina pectoris    Type 2 diabetes mellitus without complication, without long-term current use of insulin    Essential hypertension    Hyperlipidemia        Tiana Luz PA-C  Unit Based NAIF

## 2024-12-16 NOTE — PLAN OF CARE
[facility] - Cardiology Stepdown  Discharge Final Note    Primary Care Provider: Wilfredo De Souza MD    Expected Discharge Date: 12/16/2024    Final Discharge Note (most recent)       Final Note - 12/16/24 1629          Final Note    Assessment Type Final Discharge Note     Anticipated Discharge Disposition Home or Self Care     Hospital Resources/Appts/Education Provided Appointments scheduled and added to AVS        Post-Acute Status    Post-Acute Authorization HME     HME Status Set-up Complete/Auth obtained                 Per Meghana with [facility] pt approved for updated O2 and tanks to be delivered to the bedside.      UPDATE 4:59 PM  Per Karen with [facility] pt would not let her deliver new DME because it upsets his stomach.  Pt later approved to discharge with the DME he currently has.      Janis Hernandez, LMSW [facility]  [id number]      Future Appointments   Date Time Provider Department Center   12/27/2024  9:30 AM Corwin Yañez NP [facility] -    1/3/2025  8:15 AM BEASLEY, VISUAL FIELDS [facility] OPHTHAL [facility]   1/10/2025  9:00 AM Samira Palma MD [facility] OPHTHAL [facility]   2/27/2025  2:00 PM Naheed Schmitt MD [facility] PULMSVC [facility]   3/17/2025  1:00 PM Wilfredo De Souza MD [facility]         Contact Info       Wilfredo De Souza MD   Specialty: Internal Medicine   Relationship: PCP - General    [address] [place] [id number]   Phone: [phone]       Next Steps: Follow up

## 2024-12-16 NOTE — PROGRESS NOTES
Diabetes Lab ( will be due soon ),Diabetic Foot Exam, SDOH, Medication Adherence, Digital Medicine, Diabetes Education, MyChart - unable to reach patient, voicemail not set up.    Non-Compliant Blood Pressure Reading - Left message for patient to call our office. Updated blood pressure reading needed.

## 2024-12-17 LAB
BACTERIA BLD CULT: NORMAL
BACTERIA BLD CULT: NORMAL

## 2024-12-29 NOTE — PHYSICIAN QUERY
Due to the conflicting clinical picture, please clinically validate the diagnosis of Acute Hypoxemic Respiratory Failure. If validated, please provide additional clinical support for the diagnosis.  The respiratory condition is confirmed. Additional clinical support/decision making indicators for the diagnosis include (please specify):  Patient had development of significant worsening of hypoxia as measured by SpO2 during exertion.  He required an increase in supplemental O2 during exertion as demonstrated during a 6 minute walk test.

## 2024-12-30 ENCOUNTER — OFFICE VISIT (OUTPATIENT)
Dept: FAMILY MEDICINE | Facility: CLINIC | Age: 74
End: 2024-12-30
Payer: MEDICARE

## 2024-12-30 VITALS
BODY MASS INDEX: 21.09 KG/M2 | OXYGEN SATURATION: 95 % | HEIGHT: 68 IN | HEART RATE: 116 BPM | SYSTOLIC BLOOD PRESSURE: 120 MMHG | TEMPERATURE: 98 F | WEIGHT: 139.13 LBS | DIASTOLIC BLOOD PRESSURE: 70 MMHG

## 2024-12-30 DIAGNOSIS — I50.22 CHRONIC HFREF (HEART FAILURE WITH REDUCED EJECTION FRACTION): ICD-10-CM

## 2024-12-30 DIAGNOSIS — R73.9 STEROID-INDUCED HYPERGLYCEMIA: ICD-10-CM

## 2024-12-30 DIAGNOSIS — J96.11 CHRONIC HYPOXEMIC RESPIRATORY FAILURE: Primary | ICD-10-CM

## 2024-12-30 DIAGNOSIS — J84.9 INTERSTITIAL LUNG DISEASE: ICD-10-CM

## 2024-12-30 DIAGNOSIS — T38.0X5A STEROID-INDUCED HYPERGLYCEMIA: ICD-10-CM

## 2024-12-30 DIAGNOSIS — E04.9 THYROID ENLARGEMENT: ICD-10-CM

## 2024-12-30 DIAGNOSIS — I10 ESSENTIAL HYPERTENSION: ICD-10-CM

## 2024-12-30 PROCEDURE — 3008F BODY MASS INDEX DOCD: CPT | Mod: HCNC,CPTII,S$GLB, | Performed by: NURSE PRACTITIONER

## 2024-12-30 PROCEDURE — 99999 PR PBB SHADOW E&M-EST. PATIENT-LVL V: CPT | Mod: PBBFAC,,, | Performed by: NURSE PRACTITIONER

## 2024-12-30 PROCEDURE — 3062F POS MACROALBUMINURIA REV: CPT | Mod: HCNC,CPTII,S$GLB, | Performed by: NURSE PRACTITIONER

## 2024-12-30 PROCEDURE — 3078F DIAST BP <80 MM HG: CPT | Mod: HCNC,CPTII,S$GLB, | Performed by: NURSE PRACTITIONER

## 2024-12-30 PROCEDURE — 3288F FALL RISK ASSESSMENT DOCD: CPT | Mod: HCNC,CPTII,S$GLB, | Performed by: NURSE PRACTITIONER

## 2024-12-30 PROCEDURE — 3074F SYST BP LT 130 MM HG: CPT | Mod: HCNC,CPTII,S$GLB, | Performed by: NURSE PRACTITIONER

## 2024-12-30 PROCEDURE — 99214 OFFICE O/P EST MOD 30 MIN: CPT | Mod: HCNC,S$GLB,, | Performed by: NURSE PRACTITIONER

## 2024-12-30 PROCEDURE — 1111F DSCHRG MED/CURRENT MED MERGE: CPT | Mod: HCNC,CPTII,S$GLB, | Performed by: NURSE PRACTITIONER

## 2024-12-30 PROCEDURE — 1101F PT FALLS ASSESS-DOCD LE1/YR: CPT | Mod: HCNC,CPTII,S$GLB, | Performed by: NURSE PRACTITIONER

## 2024-12-30 PROCEDURE — 4010F ACE/ARB THERAPY RXD/TAKEN: CPT | Mod: HCNC,CPTII,S$GLB, | Performed by: NURSE PRACTITIONER

## 2024-12-30 PROCEDURE — 1126F AMNT PAIN NOTED NONE PRSNT: CPT | Mod: HCNC,CPTII,S$GLB, | Performed by: NURSE PRACTITIONER

## 2024-12-30 PROCEDURE — 3052F HG A1C>EQUAL 8.0%<EQUAL 9.0%: CPT | Mod: HCNC,CPTII,S$GLB, | Performed by: NURSE PRACTITIONER

## 2024-12-30 PROCEDURE — 3066F NEPHROPATHY DOC TX: CPT | Mod: HCNC,CPTII,S$GLB, | Performed by: NURSE PRACTITIONER

## 2024-12-30 PROCEDURE — 1159F MED LIST DOCD IN RCRD: CPT | Mod: HCNC,CPTII,S$GLB, | Performed by: NURSE PRACTITIONER

## 2024-12-30 RX ORDER — INSULIN GLARGINE 100 [IU]/ML
12 INJECTION, SOLUTION SUBCUTANEOUS DAILY
Qty: 6 ML | Refills: 0 | Status: SHIPPED | OUTPATIENT
Start: 2024-12-30 | End: 2025-12-30

## 2024-12-30 NOTE — PROGRESS NOTES
Routine Office Visit    Patient Name: Emmanuel Grant    : 1950  MRN: [id number]    Chief Complaint:  Hospital follow-up    Subjective:  Emmanuel is a 74 y.o. male who presents today for a hospital follow-up.  Discharge summary is as follows in bold:    Marito Valadez - Cardiology [facility] Medicine  Discharge Summary        Patient Name: Emmanuel Grant  MRN: [id number]  ALVARO: [id number]  Patient Class: IP- Inpatient  Admission Date: 2024  Hospital Length of Stay: 2 days  Discharge Date and Time:  2024 1:53 PM  Attending Physician: Caleb Lei MD   Discharging Provider: Mawadah Samad, MD  Primary Care Provider: Wilfredo De Souza MD  [facility] Medicine Team: [facility] 4 Mawadah Samad, MD  Primary Care Team: [facility]     HPI:   73yoM w/hx of T2DM, non-obstructive CAD, HTN, chronic hypoxic respiratory failure 2/2 ILD (on 2LNC at home; Cellcept for therapy), hx of CVA who presents to the hospital from Pulmonology clinic for SOB. He was being seen in Pulm clinic today when his Pulmonologist noticed that he was having increased work of breathing and worsening respiratory distress. Wanted him to be evaluated in the ED for possible infection and to rule out PE. Patient has had multiple admissions for [facility] in the last year. He reports that ever since January he has been dealing with dyspnea and cough. He typically only feels SOB during exertion but does feel it when he is at rest but talking as well. He is compliant with all of his medications and inhalers but feels like they are not working anymore. He reports some chest heaviness and SOB but denies abdominal pain, back pain, nausea, vomiting, diarrhea. He became sad during during examination as he states that he is tired of being sick and having to come back and forth to the hospital.      In the ED, patient was HDS. Afebrile. Hypertensive in the 140s to 160s and tachycardic to the 120s. Other VSS. Satting well on RA though nasal cannula  was in place. Labs were notable for Hgb 13.1, Hct 37.8, K 3.2, Mag 1.5, Alb 3.2. BNP mildly elevated at 107. COVID/Flu negative. RIP negative. CXR suggestive of chronic interstitial lung disease. No new large focal consolidation or detrimental change when compared to most recent prior. CTA negative for PE with honeycombing, traction bronchiectasis, and subpleural reticulations consistent with interstitial lung disease. Received IV Magnesium in the ED. Admitted to [facility] secondary to ILD.      * No surgery found *       Hospital Course:   73yoM w/hx of T2DM, non-obstructive CAD, HTN, chronic hypoxic respiratory failure 2/2 ILD (on 2LNC at home; Cellcept for therapy), hx of CVA who presents to the hospital from Pulmonology clinic for SOB. He was seen by Dr. Schmitt in Pulmonary clinic who referred him to the ED due to worsening SOB and hypoxia on exertion while on his home O2. On admission, he underwent infectious workup and PE workup. Infectious workup was negative with negative RIP, negative sputum culture, and negative blood cultures. CT PE showed no evidence of PE and showed ground glass opacities in the lungs from his ILD that were similar to previous CT. Pulmonology was consulted and recommended starting him on empiric CAP coverage, increasing his Methylprednisone dose from 40 daily to 40 BID, and adding on scheduled Duo Nebs Q6 while awake. Patient required new 6MWT, now requiring 4L. Antibiotics stopped shortly after. Patient to be discharged on steroid taper per pulmonology. Given his sugars being poorly controlled, patient discharged with long-acting insulin. Instructed to inject 12 units of Lantus daily and continue taking his metformin for duration of steroid taper. Was told to hold glimperide until completion of the steroid taper. Discharged with 30 day supply of Zoloft. PCP hospital f/u referral placed.       Patient who is known to me reports today for evaluation and hospital follow-up.  Since being  discharged he has been able to exercise more without oxygen and he is trying to push himself to stay off of oxygen when he can.  He is not wearing any oxygen today.  Denies any chest pain, shortness of breath, or palpitations.  Note is made of elevated heart rate today.  He has 2 days left of the prednisone and reports that his blood sugar has been elevated.  He does have his Dexcom glucose sensor in today and his current blood sugar is above 350.  He has been taking the Lantus insulin.  He notes that he is on 4 L nasal cannula oxygen at home.    Past Medical History  Past Medical History:   Diagnosis Date    CAD (coronary artery disease)     Sees [facility], h/o stent    Diabetes mellitus     Hypertension     Kidney stone     Stroke     age 62       Family History  Family History   Problem Relation Name Age of Onset    Cancer Mother      Colon cancer Sister      Esophageal cancer Neg Hx         Current Medications  Current Outpatient Medications on File Prior to Visit   Medication Sig Dispense Refill    albuterol-ipratropium (DUO-NEB) 2.5 mg-0.5 mg/3 mL nebulizer solution Take 3 mLs by nebulization every 6 (six) hours as needed for Wheezing. Rescue 75 mL 11    amLODIPine (NORVASC) 10 MG tablet Take 1 tablet (10 mg total) by mouth once daily. 90 tablet 3    arformoteroL (BROVANA) 15 mcg/2 mL Nebu Take 2 mLs (15 mcg total) by nebulization 2 (two) times daily. Controller 120 mL 11    aspirin 81 MG Chew Take 81 mg by mouth once daily.      atorvastatin (LIPITOR) 40 MG tablet Take 1 tablet (40 mg total) by mouth every evening. 90 tablet 1    blood-glucose sensor (FREESTYLE RAMBO 3 PLUS SENSOR) Blanche Change every 15 days 6 each 3    budesonide (PULMICORT) 0.5 mg/2 mL nebulizer solution Take 4 mLs (1 mg total) by nebulization 2 (two) times daily. Controller 240 mL 11    carvediloL (COREG) 25 MG tablet Take 25 mg by mouth 2 (two) times daily with meals.      cholecalciferol, vitamin D3, (VITAMIN D3) 50 mcg (2,000 unit) Cap  "capsule Take 1 capsule by mouth once daily.      furosemide (LASIX) 20 MG tablet Take 1 tablet (20 mg total) by mouth once daily. 90 tablet 3    glimepiride (AMARYL) 2 MG tablet Take 1 tablet (2 mg total) by mouth 2 (two) times a day. Take with meals  Resume taking after completion of steroid taper 180 tablet 0    latanoprost, PF, 0.005 % Drop 1 drop into affected eye in the evening Ophthalmic Once a day      metFORMIN (GLUCOPHAGE) 1000 MG tablet Take 1 tablet (1,000 mg total) by mouth 2 (two) times daily with meals.      metoprolol succinate (TOPROL-XL) 50 MG 24 hr tablet Take 1 tablet (50 mg total) by mouth once daily. 90 tablet 0    mirabegron (MYRBETRIQ) 25 mg Tb24 ER tablet Take 1 tablet (25 mg total) by mouth once daily. 30 tablet 11    mycophenolate (CELLCEPT) 250 mg Cap Take 4 capsules (1,000 mg total) by mouth 2 (two) times daily. 240 capsule 11    nitroGLYCERIN (NITROSTAT) 0.3 MG SL tablet Place 1 tablet (0.3 mg total) under the tongue every 5 (five) minutes as needed for Chest pain. 30 tablet 0    omeprazole (PRILOSEC) 40 MG capsule Take 40 mg by mouth once daily.      ONETOUCH DELICA PLUS LANCET 33 gauge Misc Apply topically 3 (three) times daily.      pen needle, diabetic 32 gauge x 5/32" Ndle 1 each by Misc.(Non-Drug; Combo Route) route once daily. 100 each 0    predniSONE (DELTASONE) 10 MG tablet Take 4 tablets (40 mg total) by mouth once daily for 5 days, THEN 2 tablets (20 mg total) once daily for 5 days, THEN 1 tablet (10 mg total) once daily for 5 days. 35 tablet 0    sacubitriL-valsartan (ENTRESTO) 24-26 mg per tablet Take 1 tablet by mouth 2 (two) times daily. 180 tablet 3    sertraline (ZOLOFT) 25 MG tablet Take 1 tablet (25 mg total) by mouth once daily. 30 tablet 0    sucralfate (CARAFATE) 1 gram tablet Take 1 tablet (1 g total) by mouth 4 (four) times daily before meals and nightly. 120 tablet 2    [DISCONTINUED] insulin glargine U-100, Lantus, (LANTUS SOLOSTAR U-100 INSULIN) 100 unit/mL (3 " "mL) InPn pen Inject 12 Units into the skin once daily. Stop using this after you complete prednisone taper.  (Discard pen 28 days after initial use) 6 mL 0     No current facility-administered medications on file prior to visit.       Allergies   Review of patient's allergies indicates:   Allergen Reactions    Dapagliflozin      Other reaction(s): Other (See Comments)    Pcn [penicillins]     Linagliptin Other (See Comments)     "it knocked me down", "it almost killed me"    Lisinopril Other (See Comments)     cough    Pantoprazole Hives       Review of Systems (Pertinent positives)  Review of Systems   Constitutional: Negative.  Negative for chills and fever.   HENT: Negative.  Negative for congestion, sinus pain and sore throat.    Eyes: Negative.    Respiratory:  Negative for cough, shortness of breath and wheezing.    Cardiovascular:  Negative for chest pain, palpitations, orthopnea and claudication.   Gastrointestinal: Negative.  Negative for abdominal pain, diarrhea, nausea and vomiting.   Genitourinary: Negative.  Negative for dysuria, frequency and urgency.   Musculoskeletal: Negative.  Negative for back pain, joint pain and neck pain.   Skin: Negative.    Neurological: Negative.  Negative for dizziness, tingling, loss of consciousness and headaches.   Endo/Heme/Allergies: Negative.    Psychiatric/Behavioral: Negative.         /70 (BP Location: Right arm, Patient Position: Sitting)   Pulse (!) 116   Temp 97.6 °F (36.4 °C) (Oral)   Ht 5' 8" (1.727 m)   Wt 63.1 kg (139 lb 1.8 oz)   SpO2 95%   BMI 21.15 kg/m²     Physical Exam  Vitals reviewed.   Constitutional:       General: He is not in acute distress.     Appearance: Normal appearance. He is not ill-appearing, toxic-appearing or diaphoretic.   HENT:      Head: Normocephalic and atraumatic.   Cardiovascular:      Rate and Rhythm: Normal rate and regular rhythm.      Pulses: Normal pulses.      Heart sounds: Normal heart sounds.   Pulmonary:      " Effort: Pulmonary effort is normal. No respiratory distress.      Breath sounds: Decreased breath sounds and wheezing present.      Comments: Decrease breath sounds noted to all lung fields.  Mild inspiratory wheezes noted bilateral bases; no rales  Abdominal:      General: Bowel sounds are normal. There is no distension.      Palpations: Abdomen is soft.      Tenderness: There is no abdominal tenderness.   Musculoskeletal:         General: No swelling, tenderness or deformity. Normal range of motion.   Skin:     General: Skin is warm and dry.      Capillary Refill: Capillary refill takes less than 2 seconds.   Neurological:      General: No focal deficit present.      Mental Status: He is alert and oriented to person, place, and time.   Psychiatric:         Mood and Affect: Mood normal.         Behavior: Behavior normal.            Assessment/Plan:  Emmanuel Grant is a 74 y.o. male who presents today for :    Emmanuel was seen today for follow-up.    Diagnoses and all orders for this visit:    Chronic hypoxemic respiratory failure    Steroid-induced hyperglycemia  -     Ambulatory referral/consult to Endocrinology; Future  -     insulin glargine U-100, Lantus, (LANTUS SOLOSTAR U-100 INSULIN) 100 unit/mL (3 mL) InPn pen; Inject 12 Units into the skin once daily.    Thyroid enlargement  -     US Soft Tissue Head Neck; Future  -     THYROID PEROXIDASE ANTIBODY; Future  -     TSH; Future    Essential hypertension    Chronic HFrEF (heart failure with reduced ejection fraction)    Interstitial lung disease    I had a detailed discussion with the patient regarding his symptoms and further workup/treatment going forward.    Reviewed CT imaging showing thyroid enlargement.  Will recheck TSH and TPO this week.  Check thyroid ultrasound.  Had decreased TSH with normal T4 during hospitalization.    He is currently on prednisone and CellCept as well as inhalers.  Recommended follow up with pulmonology as directed in regards to  interstitial lung disease.  Recommended patient to wear oxygen while he is out as well or at least have it on him while he is out in case he gets short of breath.    As for his blood sugars, he is still having elevated blood sugars but is still on prednisone.  Will have patient continue Lantus insulin as well as his sulfonylurea and metformin for now.  I will contact patient with his results later this week and ask how his sugars are doing at that point.  Endocrinology consulted to help in the management of his hyperglycemia.        This office note has been dictated.  This dictation has been generated using M-Modal Fluency Direct dictation; some phonetic errors may occur.

## 2024-12-31 ENCOUNTER — PATIENT OUTREACH (OUTPATIENT)
Dept: ADMINISTRATIVE | Facility: HOSPITAL | Age: 74
End: 2024-12-31
Payer: MEDICARE

## 2024-12-31 ENCOUNTER — HOSPITAL ENCOUNTER (OUTPATIENT)
Dept: RADIOLOGY | Facility: HOSPITAL | Age: 74
Discharge: HOME | End: 2024-12-31
Attending: NURSE PRACTITIONER
Payer: MEDICARE

## 2024-12-31 DIAGNOSIS — E04.9 THYROID ENLARGEMENT: ICD-10-CM

## 2024-12-31 DIAGNOSIS — E11.9 TYPE 2 DIABETES MELLITUS WITHOUT COMPLICATION, WITHOUT LONG-TERM CURRENT USE OF INSULIN: Primary | ICD-10-CM

## 2024-12-31 PROCEDURE — 76536 US EXAM OF HEAD AND NECK: CPT | Mod: 26,HCNC,, | Performed by: RADIOLOGY

## 2024-12-31 PROCEDURE — 76536 US EXAM OF HEAD AND NECK: CPT | Mod: TC,HCNC

## 2025-01-01 ENCOUNTER — HOSPITAL ENCOUNTER (INPATIENT)
Facility: HOSPITAL | Age: 75
LOS: 8 days | DRG: 183 | End: 2025-05-06
Attending: EMERGENCY MEDICINE
Payer: MEDICARE

## 2025-01-01 VITALS
BODY MASS INDEX: 20.73 KG/M2 | DIASTOLIC BLOOD PRESSURE: 29 MMHG | SYSTOLIC BLOOD PRESSURE: 45 MMHG | TEMPERATURE: 98 F | HEIGHT: 67 IN | OXYGEN SATURATION: 83 % | WEIGHT: 132.06 LBS

## 2025-01-01 DIAGNOSIS — G93.41 ACUTE METABOLIC ENCEPHALOPATHY: ICD-10-CM

## 2025-01-01 DIAGNOSIS — R00.0 TACHYCARDIA: ICD-10-CM

## 2025-01-01 DIAGNOSIS — J84.9 INTERSTITIAL LUNG DISEASE: ICD-10-CM

## 2025-01-01 DIAGNOSIS — S22.41XA CLOSED FRACTURE OF MULTIPLE RIBS OF RIGHT SIDE, INITIAL ENCOUNTER: Primary | ICD-10-CM
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
## 2025-01-01 DIAGNOSIS — S22.49XA RIB FRACTURES: ICD-10-CM

## 2025-01-01 DIAGNOSIS — R07.9 CHEST PAIN: ICD-10-CM

## 2025-01-01 DIAGNOSIS — S22.49XA MULTIPLE RIB FRACTURES: ICD-10-CM

## 2025-01-01 LAB
ABSOLUTE EOSINOPHIL (OHS): 0.01 K/UL
ABSOLUTE EOSINOPHIL (OHS): 0.16 K/UL
ABSOLUTE EOSINOPHIL (OHS): 0.18 K/UL
ABSOLUTE EOSINOPHIL (OHS): 0.2 K/UL
ABSOLUTE EOSINOPHIL (OHS): 0.28 K/UL
ABSOLUTE EOSINOPHIL (OHS): 0.37 K/UL
ABSOLUTE MONOCYTE (OHS): 0.62 K/UL (ref 0.3–1)
ABSOLUTE MONOCYTE (OHS): 0.66 K/UL (ref 0.3–1)
ABSOLUTE MONOCYTE (OHS): 0.72 K/UL (ref 0.3–1)
ABSOLUTE MONOCYTE (OHS): 0.75 K/UL (ref 0.3–1)
ABSOLUTE MONOCYTE (OHS): 0.82 K/UL (ref 0.3–1)
ABSOLUTE MONOCYTE (OHS): 0.89 K/UL (ref 0.3–1)
ABSOLUTE NEUTROPHIL COUNT (OHS): 4.02 K/UL (ref 1.8–7.7)
ABSOLUTE NEUTROPHIL COUNT (OHS): 4.97 K/UL (ref 1.8–7.7)
ABSOLUTE NEUTROPHIL COUNT (OHS): 5.95 K/UL (ref 1.8–7.7)
ABSOLUTE NEUTROPHIL COUNT (OHS): 6.7 K/UL (ref 1.8–7.7)
ABSOLUTE NEUTROPHIL COUNT (OHS): 6.79 K/UL (ref 1.8–7.7)
ABSOLUTE NEUTROPHIL COUNT (OHS): 8.32 K/UL (ref 1.8–7.7)
ABSOLUTE NEUTROPHIL MANUAL (OHS): 3.7 K/UL
ABSOLUTE NEUTROPHIL MANUAL (OHS): 7.5 K/UL
ABSOLUTE NEUTROPHIL MANUAL (OHS): 8.1 K/UL
ALBUMIN SERPL BCP-MCNC: 1.9 G/DL (ref 3.5–5.2)
ALBUMIN SERPL BCP-MCNC: 2 G/DL (ref 3.5–5.2)
ALBUMIN SERPL BCP-MCNC: 3.2 G/DL (ref 3.5–5.2)
ALBUMIN SERPL BCP-MCNC: 3.4 G/DL (ref 3.5–5.2)
ALLENS TEST: ABNORMAL
ALP SERPL-CCNC: 49 UNIT/L (ref 40–150)
ALP SERPL-CCNC: 69 UNIT/L (ref 40–150)
ALP SERPL-CCNC: 71 UNIT/L (ref 40–150)
ALT SERPL W/O P-5'-P-CCNC: 25 UNIT/L (ref 10–44)
ALT SERPL W/O P-5'-P-CCNC: 27 UNIT/L (ref 10–44)
ALT SERPL W/O P-5'-P-CCNC: 31 UNIT/L (ref 10–44)
ANION GAP (OHS): 11 MMOL/L (ref 8–16)
ANION GAP (OHS): 12 MMOL/L (ref 8–16)
ANION GAP (OHS): 13 MMOL/L (ref 8–16)
ANION GAP (OHS): 14 MMOL/L (ref 8–16)
ANION GAP (OHS): 15 MMOL/L (ref 8–16)
ANION GAP (OHS): 16 MMOL/L (ref 8–16)
ANION GAP (OHS): 19 MMOL/L (ref 8–16)
ANION GAP (OHS): 8 MMOL/L (ref 8–16)
ANION GAP (OHS): 9 MMOL/L (ref 8–16)
ANISOCYTOSIS BLD QL SMEAR: SLIGHT
AST SERPL-CCNC: 23 UNIT/L (ref 11–45)
AST SERPL-CCNC: 24 UNIT/L (ref 11–45)
AST SERPL-CCNC: 55 UNIT/L (ref 11–45)
B-OH-BUTYR BLD STRIP-SCNC: 3.9 MMOL/L
BACTERIA #/AREA URNS AUTO: ABNORMAL /HPF
BACTERIA #/AREA URNS AUTO: NORMAL /HPF
BACTERIA BLD CULT: NORMAL
BACTERIA BLD CULT: NORMAL
BASOPHILS # BLD AUTO: 0.01 K/UL
BASOPHILS # BLD AUTO: 0.02 K/UL
BASOPHILS # BLD AUTO: 0.03 K/UL
BASOPHILS NFR BLD AUTO: 0.1 %
BASOPHILS NFR BLD AUTO: 0.2 %
BASOPHILS NFR BLD AUTO: 0.2 %
BASOPHILS NFR BLD AUTO: 0.3 %
BASOPHILS NFR BLD AUTO: 0.4 %
BASOPHILS NFR BLD AUTO: 0.4 %
BILIRUB SERPL-MCNC: 0.5 MG/DL (ref 0.1–1)
BILIRUB SERPL-MCNC: 0.7 MG/DL (ref 0.1–1)
BILIRUB SERPL-MCNC: 1.2 MG/DL (ref 0.1–1)
BILIRUB UR QL STRIP.AUTO: NEGATIVE
BILIRUB UR QL STRIP.AUTO: NEGATIVE
BNP SERPL-MCNC: 411 PG/ML (ref 0–99)
BUN SERPL-MCNC: 13 MG/DL (ref 8–23)
BUN SERPL-MCNC: 18 MG/DL (ref 8–23)
BUN SERPL-MCNC: 28 MG/DL (ref 8–23)
BUN SERPL-MCNC: 39 MG/DL (ref 8–23)
BUN SERPL-MCNC: 41 MG/DL (ref 8–23)
BUN SERPL-MCNC: 42 MG/DL (ref 8–23)
BUN SERPL-MCNC: 43 MG/DL (ref 8–23)
BUN SERPL-MCNC: 44 MG/DL (ref 8–23)
BUN SERPL-MCNC: 44 MG/DL (ref 8–23)
BUN SERPL-MCNC: 45 MG/DL (ref 8–23)
BUN SERPL-MCNC: 52 MG/DL (ref 8–23)
CALCIUM SERPL-MCNC: 8.2 MG/DL (ref 8.7–10.5)
CALCIUM SERPL-MCNC: 8.7 MG/DL (ref 8.7–10.5)
CALCIUM SERPL-MCNC: 8.8 MG/DL (ref 8.7–10.5)
CALCIUM SERPL-MCNC: 9.1 MG/DL (ref 8.7–10.5)
CALCIUM SERPL-MCNC: 9.2 MG/DL (ref 8.7–10.5)
CALCIUM SERPL-MCNC: 9.2 MG/DL (ref 8.7–10.5)
CALCIUM SERPL-MCNC: 9.3 MG/DL (ref 8.7–10.5)
CALCIUM SERPL-MCNC: 9.4 MG/DL (ref 8.7–10.5)
CALCIUM SERPL-MCNC: 9.5 MG/DL (ref 8.7–10.5)
CALCIUM SERPL-MCNC: 9.6 MG/DL (ref 8.7–10.5)
CALCIUM SERPL-MCNC: 9.7 MG/DL (ref 8.7–10.5)
CHLORIDE SERPL-SCNC: 100 MMOL/L (ref 95–110)
CHLORIDE SERPL-SCNC: 101 MMOL/L (ref 95–110)
CHLORIDE SERPL-SCNC: 105 MMOL/L (ref 95–110)
CHLORIDE SERPL-SCNC: 105 MMOL/L (ref 95–110)
CHLORIDE SERPL-SCNC: 108 MMOL/L (ref 95–110)
CHLORIDE SERPL-SCNC: 109 MMOL/L (ref 95–110)
CHLORIDE SERPL-SCNC: 112 MMOL/L (ref 95–110)
CHLORIDE SERPL-SCNC: 112 MMOL/L (ref 95–110)
CHLORIDE SERPL-SCNC: 113 MMOL/L (ref 95–110)
CHLORIDE SERPL-SCNC: 113 MMOL/L (ref 95–110)
CHLORIDE SERPL-SCNC: 99 MMOL/L (ref 95–110)
CLARITY UR: ABNORMAL
CLARITY UR: CLEAR
CO2 SERPL-SCNC: 19 MMOL/L (ref 23–29)
CO2 SERPL-SCNC: 21 MMOL/L (ref 23–29)
CO2 SERPL-SCNC: 21 MMOL/L (ref 23–29)
CO2 SERPL-SCNC: 22 MMOL/L (ref 23–29)
CO2 SERPL-SCNC: 22 MMOL/L (ref 23–29)
CO2 SERPL-SCNC: 23 MMOL/L (ref 23–29)
CO2 SERPL-SCNC: 25 MMOL/L (ref 23–29)
CO2 SERPL-SCNC: 25 MMOL/L (ref 23–29)
CO2 SERPL-SCNC: 26 MMOL/L (ref 23–29)
COLOR UR AUTO: YELLOW
COLOR UR AUTO: YELLOW
CREAT SERPL-MCNC: 0.8 MG/DL (ref 0.5–1.4)
CREAT SERPL-MCNC: 0.9 MG/DL (ref 0.5–1.4)
CREAT SERPL-MCNC: 0.9 MG/DL (ref 0.5–1.4)
CREAT SERPL-MCNC: 1 MG/DL (ref 0.5–1.4)
CREAT SERPL-MCNC: 1 MG/DL (ref 0.5–1.4)
CREAT SERPL-MCNC: 1.1 MG/DL (ref 0.5–1.4)
CREAT SERPL-MCNC: 1.1 MG/DL (ref 0.5–1.4)
CREAT SERPL-MCNC: 1.3 MG/DL (ref 0.5–1.4)
CREAT SERPL-MCNC: 1.6 MG/DL (ref 0.5–1.4)
DELSYS: ABNORMAL
EAG (OHS): 180 MG/DL (ref 68–131)
EP: 4
EP: 4
ERYTHROCYTE [DISTWIDTH] IN BLOOD BY AUTOMATED COUNT: 14.4 % (ref 11.5–14.5)
ERYTHROCYTE [DISTWIDTH] IN BLOOD BY AUTOMATED COUNT: 14.4 % (ref 11.5–14.5)
ERYTHROCYTE [DISTWIDTH] IN BLOOD BY AUTOMATED COUNT: 14.5 % (ref 11.5–14.5)
ERYTHROCYTE [DISTWIDTH] IN BLOOD BY AUTOMATED COUNT: 14.6 % (ref 11.5–14.5)
ERYTHROCYTE [DISTWIDTH] IN BLOOD BY AUTOMATED COUNT: 15 % (ref 11.5–14.5)
ERYTHROCYTE [DISTWIDTH] IN BLOOD BY AUTOMATED COUNT: 15 % (ref 11.5–14.5)
ERYTHROCYTE [DISTWIDTH] IN BLOOD BY AUTOMATED COUNT: 15.2 % (ref 11.5–14.5)
ERYTHROCYTE [SEDIMENTATION RATE] IN BLOOD BY WESTERGREN METHOD: 12 MM/H
ERYTHROCYTE [SEDIMENTATION RATE] IN BLOOD BY WESTERGREN METHOD: 12 MM/H
FIO2: 100
FIO2: 100
FIO2: 31
FIO2: 35
FIO2: 50
FLOW: 12
FLOW: 15
FLOW: 20
FLOW: 8
GFR SERPLBLD CREATININE-BSD FMLA CKD-EPI: 45 ML/MIN/1.73/M2
GFR SERPLBLD CREATININE-BSD FMLA CKD-EPI: 58 ML/MIN/1.73/M2
GFR SERPLBLD CREATININE-BSD FMLA CKD-EPI: >60 ML/MIN/1.73/M2
GLUCOSE SERPL-MCNC: 155 MG/DL (ref 70–110)
GLUCOSE SERPL-MCNC: 155 MG/DL (ref 70–110)
GLUCOSE SERPL-MCNC: 167 MG/DL (ref 70–110)
GLUCOSE SERPL-MCNC: 195 MG/DL (ref 70–110)
GLUCOSE SERPL-MCNC: 199 MG/DL (ref 70–110)
GLUCOSE SERPL-MCNC: 212 MG/DL (ref 70–110)
GLUCOSE SERPL-MCNC: 218 MG/DL (ref 70–110)
GLUCOSE SERPL-MCNC: 236 MG/DL (ref 70–110)
GLUCOSE SERPL-MCNC: 277 MG/DL (ref 70–110)
GLUCOSE SERPL-MCNC: 292 MG/DL (ref 70–110)
GLUCOSE SERPL-MCNC: 321 MG/DL (ref 70–110)
GLUCOSE SERPL-MCNC: 90 MG/DL (ref 70–110)
GLUCOSE UR QL STRIP: ABNORMAL
GLUCOSE UR QL STRIP: NEGATIVE
HBA1C MFR BLD: 7.9 % (ref 4–5.6)
HCO3 UR-SCNC: 22.3 MMOL/L (ref 24–28)
HCO3 UR-SCNC: 22.6 MMOL/L (ref 24–28)
HCO3 UR-SCNC: 24.5 MMOL/L (ref 24–28)
HCO3 UR-SCNC: 26.7 MMOL/L (ref 24–28)
HCO3 UR-SCNC: 26.7 MMOL/L (ref 24–28)
HCO3 UR-SCNC: 27.4 MMOL/L (ref 24–28)
HCT VFR BLD AUTO: 33.4 % (ref 40–54)
HCT VFR BLD AUTO: 33.4 % (ref 40–54)
HCT VFR BLD AUTO: 33.7 % (ref 40–54)
HCT VFR BLD AUTO: 36.8 % (ref 40–54)
HCT VFR BLD AUTO: 37 % (ref 40–54)
HCT VFR BLD AUTO: 37.2 % (ref 40–54)
HCT VFR BLD AUTO: 37.9 % (ref 40–54)
HCT VFR BLD AUTO: 38.7 % (ref 40–54)
HCT VFR BLD AUTO: 38.8 % (ref 40–54)
HCT VFR BLD AUTO: 40.5 % (ref 40–54)
HCT VFR BLD AUTO: 40.7 % (ref 40–54)
HCT VFR BLD AUTO: 42.7 % (ref 40–54)
HGB BLD-MCNC: 10.3 GM/DL (ref 14–18)
HGB BLD-MCNC: 10.4 GM/DL (ref 14–18)
HGB BLD-MCNC: 10.5 GM/DL (ref 14–18)
HGB BLD-MCNC: 11.4 GM/DL (ref 14–18)
HGB BLD-MCNC: 11.8 GM/DL (ref 14–18)
HGB BLD-MCNC: 12 GM/DL (ref 14–18)
HGB BLD-MCNC: 12 GM/DL (ref 14–18)
HGB BLD-MCNC: 12.4 GM/DL (ref 14–18)
HGB BLD-MCNC: 12.6 GM/DL (ref 14–18)
HGB BLD-MCNC: 12.6 GM/DL (ref 14–18)
HGB BLD-MCNC: 13.4 GM/DL (ref 14–18)
HGB BLD-MCNC: 13.9 GM/DL (ref 14–18)
HGB UR QL STRIP: ABNORMAL
HGB UR QL STRIP: NEGATIVE
HOLD SPECIMEN: NORMAL
HYALINE CASTS UR QL AUTO: 0 /LPF (ref 0–1)
HYALINE CASTS UR QL AUTO: 0 /LPF (ref 0–1)
IMM GRANULOCYTES # BLD AUTO: 0.01 K/UL (ref 0–0.04)
IMM GRANULOCYTES # BLD AUTO: 0.02 K/UL (ref 0–0.04)
IMM GRANULOCYTES # BLD AUTO: 0.03 K/UL (ref 0–0.04)
IMM GRANULOCYTES # BLD AUTO: 0.03 K/UL (ref 0–0.04)
IMM GRANULOCYTES # BLD AUTO: 0.04 K/UL (ref 0–0.04)
IMM GRANULOCYTES # BLD AUTO: 0.04 K/UL (ref 0–0.04)
IMM GRANULOCYTES NFR BLD AUTO: 0.2 % (ref 0–0.5)
IMM GRANULOCYTES NFR BLD AUTO: 0.3 % (ref 0–0.5)
IMM GRANULOCYTES NFR BLD AUTO: 0.3 % (ref 0–0.5)
IMM GRANULOCYTES NFR BLD AUTO: 0.4 % (ref 0–0.5)
IMM GRANULOCYTES NFR BLD AUTO: 0.4 % (ref 0–0.5)
IMM GRANULOCYTES NFR BLD AUTO: 0.5 % (ref 0–0.5)
IP: 12
IP: 12
KETONES UR QL STRIP: ABNORMAL
KETONES UR QL STRIP: NEGATIVE
LACTATE SERPL-SCNC: 1.5 MMOL/L (ref 0.5–2.2)
LEUKOCYTE ESTERASE UR QL STRIP: NEGATIVE
LEUKOCYTE ESTERASE UR QL STRIP: NEGATIVE
LYMPHOCYTES # BLD AUTO: 0.51 K/UL (ref 1–4.8)
LYMPHOCYTES # BLD AUTO: 0.71 K/UL (ref 1–4.8)
LYMPHOCYTES # BLD AUTO: 0.77 K/UL (ref 1–4.8)
LYMPHOCYTES # BLD AUTO: 0.94 K/UL (ref 1–4.8)
LYMPHOCYTES # BLD AUTO: 1.18 K/UL (ref 1–4.8)
LYMPHOCYTES # BLD AUTO: 1.68 K/UL (ref 1–4.8)
LYMPHOCYTES NFR BLD MANUAL: 10 % (ref 18–48)
LYMPHOCYTES NFR BLD MANUAL: 11 % (ref 18–48)
LYMPHOCYTES NFR BLD MANUAL: 9 % (ref 18–48)
MAGNESIUM SERPL-MCNC: 1.6 MG/DL (ref 1.6–2.6)
MAGNESIUM SERPL-MCNC: 1.9 MG/DL (ref 1.6–2.6)
MAGNESIUM SERPL-MCNC: 2 MG/DL (ref 1.6–2.6)
MAGNESIUM SERPL-MCNC: 2.2 MG/DL (ref 1.6–2.6)
MCH RBC QN AUTO: 26.5 PG (ref 27–31)
MCH RBC QN AUTO: 26.8 PG (ref 27–31)
MCH RBC QN AUTO: 26.9 PG (ref 27–31)
MCH RBC QN AUTO: 27 PG (ref 27–31)
MCH RBC QN AUTO: 27 PG (ref 27–31)
MCH RBC QN AUTO: 27.1 PG (ref 27–31)
MCH RBC QN AUTO: 27.3 PG (ref 27–31)
MCHC RBC AUTO-ENTMCNC: 30.8 G/DL (ref 32–36)
MCHC RBC AUTO-ENTMCNC: 30.8 G/DL (ref 32–36)
MCHC RBC AUTO-ENTMCNC: 31.1 G/DL (ref 32–36)
MCHC RBC AUTO-ENTMCNC: 31.7 G/DL (ref 32–36)
MCHC RBC AUTO-ENTMCNC: 31.7 G/DL (ref 32–36)
MCHC RBC AUTO-ENTMCNC: 32.6 G/DL (ref 32–36)
MCHC RBC AUTO-ENTMCNC: 32.9 G/DL (ref 32–36)
MCV RBC AUTO: 82 FL (ref 82–98)
MCV RBC AUTO: 83 FL (ref 82–98)
MCV RBC AUTO: 83 FL (ref 82–98)
MCV RBC AUTO: 84 FL (ref 82–98)
MCV RBC AUTO: 85 FL (ref 82–98)
MCV RBC AUTO: 86 FL (ref 82–98)
MCV RBC AUTO: 86 FL (ref 82–98)
MCV RBC AUTO: 87 FL (ref 82–98)
MCV RBC AUTO: 87 FL (ref 82–98)
METAMYELOCYTES NFR BLD MANUAL: 1 %
METAMYELOCYTES NFR BLD MANUAL: 7 %
MICROSCOPIC COMMENT: ABNORMAL
MICROSCOPIC COMMENT: NORMAL
MIN VOL: 12.7
MIN VOL: 14.8
MODE: ABNORMAL
MONOCYTES NFR BLD MANUAL: 4 % (ref 4–15)
MONOCYTES NFR BLD MANUAL: 5 % (ref 4–15)
MYELOCYTES NFR BLD MANUAL: 6 %
NEUTROPHILS NFR BLD MANUAL: 37 % (ref 38–73)
NEUTROPHILS NFR BLD MANUAL: 45 % (ref 38–73)
NEUTROPHILS NFR BLD MANUAL: 53 % (ref 38–73)
NEUTS BAND NFR BLD MANUAL: 32 %
NEUTS BAND NFR BLD MANUAL: 40 %
NEUTS BAND NFR BLD MANUAL: 40 %
NITRITE UR QL STRIP: NEGATIVE
NITRITE UR QL STRIP: NEGATIVE
NUCLEATED RBC (/100WBC) (OHS): 0 /100 WBC
PCO2 BLDA: 38.9 MMHG (ref 35–45)
PCO2 BLDA: 40.6 MMHG (ref 35–45)
PCO2 BLDA: 46.3 MMHG (ref 35–45)
PCO2 BLDA: 48.6 MMHG (ref 35–45)
PCO2 BLDA: 58.8 MMHG (ref 35–45)
PCO2 BLDA: 69.3 MMHG (ref 35–45)
PH SMN: 7.19 [PH] (ref 7.35–7.45)
PH SMN: 7.26 [PH] (ref 7.35–7.45)
PH SMN: 7.27 [PH] (ref 7.35–7.45)
PH SMN: 7.36 [PH] (ref 7.35–7.45)
PH SMN: 7.38 [PH] (ref 7.35–7.45)
PH SMN: 7.41 [PH] (ref 7.35–7.45)
PH UR STRIP: 6 [PH]
PH UR STRIP: 6 [PH]
PHOSPHATE SERPL-MCNC: 3.4 MG/DL (ref 2.7–4.5)
PHOSPHATE SERPL-MCNC: 3.4 MG/DL (ref 2.7–4.5)
PHOSPHATE SERPL-MCNC: 3.9 MG/DL (ref 2.7–4.5)
PLATELET # BLD AUTO: 164 K/UL (ref 150–450)
PLATELET # BLD AUTO: 166 K/UL (ref 150–450)
PLATELET # BLD AUTO: 190 K/UL (ref 150–450)
PLATELET # BLD AUTO: 191 K/UL (ref 150–450)
PLATELET # BLD AUTO: 195 K/UL (ref 150–450)
PLATELET # BLD AUTO: 213 K/UL (ref 150–450)
PLATELET # BLD AUTO: 228 K/UL (ref 150–450)
PLATELET # BLD AUTO: 234 K/UL (ref 150–450)
PLATELET # BLD AUTO: 237 K/UL (ref 150–450)
PLATELET BLD QL SMEAR: ABNORMAL
PMV BLD AUTO: 10.1 FL (ref 9.2–12.9)
PMV BLD AUTO: 10.2 FL (ref 9.2–12.9)
PMV BLD AUTO: 10.4 FL (ref 9.2–12.9)
PMV BLD AUTO: 10.6 FL (ref 9.2–12.9)
PMV BLD AUTO: 10.6 FL (ref 9.2–12.9)
PMV BLD AUTO: 11 FL (ref 9.2–12.9)
PMV BLD AUTO: 11.1 FL (ref 9.2–12.9)
PMV BLD AUTO: 11.3 FL (ref 9.2–12.9)
PMV BLD AUTO: 9.7 FL (ref 9.2–12.9)
PO2 BLDA: 158 MMHG (ref 80–100)
PO2 BLDA: 46 MMHG (ref 40–60)
PO2 BLDA: 46 MMHG (ref 80–100)
PO2 BLDA: 48 MMHG (ref 80–100)
PO2 BLDA: 66 MMHG (ref 80–100)
PO2 BLDA: 96 MMHG (ref 80–100)
POC BE: -1 MMOL/L (ref -2–2)
POC BE: -3 MMOL/L (ref -2–2)
POC BE: -3 MMOL/L (ref -2–2)
POC BE: -5 MMOL/L (ref -2–2)
POC BE: 0 MMOL/L (ref -2–2)
POC BE: 2 MMOL/L (ref -2–2)
POC SATURATED O2: 72 % (ref 95–100)
POC SATURATED O2: 74 % (ref 95–100)
POC SATURATED O2: 82 % (ref 95–100)
POC SATURATED O2: 92 % (ref 95–100)
POC SATURATED O2: 97 % (ref 95–100)
POC SATURATED O2: 99 % (ref 95–100)
POC TCO2: 24 MMOL/L (ref 23–27)
POC TCO2: 24 MMOL/L (ref 23–27)
POC TCO2: 26 MMOL/L (ref 23–27)
POC TCO2: 28 MMOL/L (ref 24–29)
POC TCO2: 29 MMOL/L (ref 23–27)
POC TCO2: 29 MMOL/L (ref 23–27)
POCT GLUCOSE: 107 MG/DL (ref 70–110)
POCT GLUCOSE: 110 MG/DL (ref 70–110)
POCT GLUCOSE: 125 MG/DL (ref 70–110)
POCT GLUCOSE: 149 MG/DL (ref 70–110)
POCT GLUCOSE: 159 MG/DL (ref 70–110)
POCT GLUCOSE: 162 MG/DL (ref 70–110)
POCT GLUCOSE: 166 MG/DL (ref 70–110)
POCT GLUCOSE: 171 MG/DL (ref 70–110)
POCT GLUCOSE: 174 MG/DL (ref 70–110)
POCT GLUCOSE: 176 MG/DL (ref 70–110)
POCT GLUCOSE: 184 MG/DL (ref 70–110)
POCT GLUCOSE: 187 MG/DL (ref 70–110)
POCT GLUCOSE: 188 MG/DL (ref 70–110)
POCT GLUCOSE: 189 MG/DL (ref 70–110)
POCT GLUCOSE: 199 MG/DL (ref 70–110)
POCT GLUCOSE: 200 MG/DL (ref 70–110)
POCT GLUCOSE: 207 MG/DL (ref 70–110)
POCT GLUCOSE: 215 MG/DL (ref 70–110)
POCT GLUCOSE: 219 MG/DL (ref 70–110)
POCT GLUCOSE: 222 MG/DL (ref 70–110)
POCT GLUCOSE: 224 MG/DL (ref 70–110)
POCT GLUCOSE: 237 MG/DL (ref 70–110)
POCT GLUCOSE: 243 MG/DL (ref 70–110)
POCT GLUCOSE: 257 MG/DL (ref 70–110)
POCT GLUCOSE: 260 MG/DL (ref 70–110)
POCT GLUCOSE: 272 MG/DL (ref 70–110)
POCT GLUCOSE: 288 MG/DL (ref 70–110)
POCT GLUCOSE: 291 MG/DL (ref 70–110)
POCT GLUCOSE: 310 MG/DL (ref 70–110)
POCT GLUCOSE: 316 MG/DL (ref 70–110)
POCT GLUCOSE: 317 MG/DL (ref 70–110)
POCT GLUCOSE: 318 MG/DL (ref 70–110)
POCT GLUCOSE: 328 MG/DL (ref 70–110)
POTASSIUM SERPL-SCNC: 3.2 MMOL/L (ref 3.5–5.1)
POTASSIUM SERPL-SCNC: 3.5 MMOL/L (ref 3.5–5.1)
POTASSIUM SERPL-SCNC: 3.6 MMOL/L (ref 3.5–5.1)
POTASSIUM SERPL-SCNC: 3.7 MMOL/L (ref 3.5–5.1)
POTASSIUM SERPL-SCNC: 3.8 MMOL/L (ref 3.5–5.1)
POTASSIUM SERPL-SCNC: 4.1 MMOL/L (ref 3.5–5.1)
POTASSIUM SERPL-SCNC: 4.2 MMOL/L (ref 3.5–5.1)
POTASSIUM SERPL-SCNC: 4.4 MMOL/L (ref 3.5–5.1)
POTASSIUM SERPL-SCNC: 4.8 MMOL/L (ref 3.5–5.1)
POTASSIUM SERPL-SCNC: 5.2 MMOL/L (ref 3.5–5.1)
POTASSIUM SERPL-SCNC: 5.3 MMOL/L (ref 3.5–5.1)
PROT SERPL-MCNC: 5.6 GM/DL (ref 6–8.4)
PROT SERPL-MCNC: 7 GM/DL (ref 6–8.4)
PROT SERPL-MCNC: 7.6 GM/DL (ref 6–8.4)
PROT UR QL STRIP: ABNORMAL
PROT UR QL STRIP: ABNORMAL
RBC # BLD AUTO: 3.88 M/UL (ref 4.6–6.2)
RBC # BLD AUTO: 4.25 M/UL (ref 4.6–6.2)
RBC # BLD AUTO: 4.37 M/UL (ref 4.6–6.2)
RBC # BLD AUTO: 4.43 M/UL (ref 4.6–6.2)
RBC # BLD AUTO: 4.43 M/UL (ref 4.6–6.2)
RBC # BLD AUTO: 4.62 M/UL (ref 4.6–6.2)
RBC # BLD AUTO: 4.65 M/UL (ref 4.6–6.2)
RBC # BLD AUTO: 4.97 M/UL (ref 4.6–6.2)
RBC # BLD AUTO: 5.16 M/UL (ref 4.6–6.2)
RBC #/AREA URNS AUTO: 0 /HPF (ref 0–4)
RBC #/AREA URNS AUTO: 10 /HPF (ref 0–4)
RELATIVE EOSINOPHIL (OHS): 0.1 %
RELATIVE EOSINOPHIL (OHS): 1.5 %
RELATIVE EOSINOPHIL (OHS): 3 %
RELATIVE EOSINOPHIL (OHS): 3.2 %
RELATIVE EOSINOPHIL (OHS): 3.4 %
RELATIVE EOSINOPHIL (OHS): 3.8 %
RELATIVE LYMPHOCYTE (OHS): 11.6 % (ref 18–48)
RELATIVE LYMPHOCYTE (OHS): 12.5 % (ref 18–48)
RELATIVE LYMPHOCYTE (OHS): 14.3 % (ref 18–48)
RELATIVE LYMPHOCYTE (OHS): 17.3 % (ref 18–48)
RELATIVE LYMPHOCYTE (OHS): 6.5 % (ref 18–48)
RELATIVE LYMPHOCYTE (OHS): 9.1 % (ref 18–48)
RELATIVE MONOCYTE (OHS): 12.7 % (ref 4–15)
RELATIVE MONOCYTE (OHS): 7.9 % (ref 4–15)
RELATIVE MONOCYTE (OHS): 8.4 % (ref 4–15)
RELATIVE MONOCYTE (OHS): 8.6 % (ref 4–15)
RELATIVE MONOCYTE (OHS): 9.1 % (ref 4–15)
RELATIVE MONOCYTE (OHS): 9.9 % (ref 4–15)
RELATIVE NEUTROPHIL (OHS): 70 % (ref 38–73)
RELATIVE NEUTROPHIL (OHS): 71 % (ref 38–73)
RELATIVE NEUTROPHIL (OHS): 72.3 % (ref 38–73)
RELATIVE NEUTROPHIL (OHS): 74.9 % (ref 38–73)
RELATIVE NEUTROPHIL (OHS): 80.2 % (ref 38–73)
RELATIVE NEUTROPHIL (OHS): 85 % (ref 38–73)
SAMPLE: ABNORMAL
SITE: ABNORMAL
SODIUM SERPL-SCNC: 136 MMOL/L (ref 136–145)
SODIUM SERPL-SCNC: 140 MMOL/L (ref 136–145)
SODIUM SERPL-SCNC: 140 MMOL/L (ref 136–145)
SODIUM SERPL-SCNC: 142 MMOL/L (ref 136–145)
SODIUM SERPL-SCNC: 143 MMOL/L (ref 136–145)
SODIUM SERPL-SCNC: 144 MMOL/L (ref 136–145)
SODIUM SERPL-SCNC: 144 MMOL/L (ref 136–145)
SODIUM SERPL-SCNC: 145 MMOL/L (ref 136–145)
SODIUM SERPL-SCNC: 146 MMOL/L (ref 136–145)
SODIUM SERPL-SCNC: 146 MMOL/L (ref 136–145)
SODIUM SERPL-SCNC: 148 MMOL/L (ref 136–145)
SP GR UR STRIP: 1.01
SP GR UR STRIP: 1.02
SP02: 100
SP02: 100
SP02: 84
SPONT RATE: 15
SPONT RATE: 40
TOXIC GRANULES BLD QL SMEAR: PRESENT
TROPONIN I SERPL DL<=0.01 NG/ML-MCNC: 0.01 NG/ML
TROPONIN I SERPL DL<=0.01 NG/ML-MCNC: 0.03 NG/ML
UROBILINOGEN UR STRIP-ACNC: NEGATIVE EU/DL
UROBILINOGEN UR STRIP-ACNC: NEGATIVE EU/DL
VANCOMYCIN SERPL-MCNC: 10.4 UG/ML (ref ?–80)
VANCOMYCIN SERPL-MCNC: 26 UG/ML (ref ?–80)
VANCOMYCIN TROUGH SERPL-MCNC: 14.2 UG/ML (ref 10–22)
WBC # BLD AUTO: 10.37 K/UL (ref 3.9–12.7)
WBC # BLD AUTO: 4.38 K/UL (ref 3.9–12.7)
WBC # BLD AUTO: 5.66 K/UL (ref 3.9–12.7)
WBC # BLD AUTO: 6.64 K/UL (ref 3.9–12.7)
WBC # BLD AUTO: 7.88 K/UL (ref 3.9–12.7)
WBC # BLD AUTO: 8.23 K/UL (ref 3.9–12.7)
WBC # BLD AUTO: 9.52 K/UL (ref 3.9–12.7)
WBC # BLD AUTO: 9.7 K/UL (ref 3.9–12.7)
WBC # BLD AUTO: 9.71 K/UL (ref 3.9–12.7)
WBC #/AREA URNS AUTO: 0 /HPF (ref 0–5)
WBC #/AREA URNS AUTO: 3 /HPF (ref 0–5)
WBC TOXIC VACUOLES BLD QL SMEAR: PRESENT

## 2025-01-01 PROCEDURE — 63600175 PHARM REV CODE 636 W HCPCS: Mod: HCNC | Performed by: STUDENT IN AN ORGANIZED HEALTH CARE EDUCATION/TRAINING PROGRAM

## 2025-01-01 PROCEDURE — 63600175 PHARM REV CODE 636 W HCPCS: Mod: HCNC | Performed by: HOSPITALIST

## 2025-01-01 PROCEDURE — 83880 ASSAY OF NATRIURETIC PEPTIDE: CPT | Mod: HCNC | Performed by: INTERNAL MEDICINE

## 2025-01-01 PROCEDURE — 63600175 PHARM REV CODE 636 W HCPCS: Mod: HCNC | Performed by: REGISTERED NURSE

## 2025-01-01 PROCEDURE — 85025 COMPLETE CBC W/AUTO DIFF WBC: CPT | Mod: HCNC | Performed by: PHYSICIAN ASSISTANT

## 2025-01-01 PROCEDURE — 63600175 PHARM REV CODE 636 W HCPCS: Mod: HCNC | Performed by: SURGERY

## 2025-01-01 PROCEDURE — 85025 COMPLETE CBC W/AUTO DIFF WBC: CPT | Mod: HCNC | Performed by: HOSPITALIST

## 2025-01-01 PROCEDURE — 99291 CRITICAL CARE FIRST HOUR: CPT | Mod: HCNC,,, | Performed by: INTERNAL MEDICINE

## 2025-01-01 PROCEDURE — 27000221 HC OXYGEN, UP TO 24 HOURS: Mod: HCNC

## 2025-01-01 PROCEDURE — 94660 CPAP INITIATION&MGMT: CPT | Mod: HCNC

## 2025-01-01 PROCEDURE — 63600175 PHARM REV CODE 636 W HCPCS: Mod: HCNC | Performed by: INTERNAL MEDICINE

## 2025-01-01 PROCEDURE — 94640 AIRWAY INHALATION TREATMENT: CPT | Mod: HCNC

## 2025-01-01 PROCEDURE — 83735 ASSAY OF MAGNESIUM: CPT | Mod: HCNC | Performed by: SURGERY

## 2025-01-01 PROCEDURE — 80202 ASSAY OF VANCOMYCIN: CPT | Mod: HCNC | Performed by: HOSPITALIST

## 2025-01-01 PROCEDURE — 99291 CRITICAL CARE FIRST HOUR: CPT | Mod: HCNC

## 2025-01-01 PROCEDURE — 99233 SBSQ HOSP IP/OBS HIGH 50: CPT | Mod: HCNC,,, | Performed by: INTERNAL MEDICINE

## 2025-01-01 PROCEDURE — 87040 BLOOD CULTURE FOR BACTERIA: CPT | Mod: HCNC | Performed by: HOSPITALIST

## 2025-01-01 PROCEDURE — 63600175 PHARM REV CODE 636 W HCPCS: Mod: HCNC | Performed by: PHYSICIAN ASSISTANT

## 2025-01-01 PROCEDURE — 36600 WITHDRAWAL OF ARTERIAL BLOOD: CPT | Mod: HCNC

## 2025-01-01 PROCEDURE — 25000003 PHARM REV CODE 250: Mod: HCNC

## 2025-01-01 PROCEDURE — 25000003 PHARM REV CODE 250: Mod: HCNC | Performed by: INTERNAL MEDICINE

## 2025-01-01 PROCEDURE — 20000000 HC ICU ROOM: Mod: HCNC

## 2025-01-01 PROCEDURE — 85018 HEMOGLOBIN: CPT | Mod: HCNC | Performed by: INTERNAL MEDICINE

## 2025-01-01 PROCEDURE — 36569 INSJ PICC 5 YR+ W/O IMAGING: CPT | Mod: HCNC

## 2025-01-01 PROCEDURE — 63600175 PHARM REV CODE 636 W HCPCS: Mod: HCNC

## 2025-01-01 PROCEDURE — 36415 COLL VENOUS BLD VENIPUNCTURE: CPT | Mod: HCNC | Performed by: HOSPITALIST

## 2025-01-01 PROCEDURE — 84460 ALANINE AMINO (ALT) (SGPT): CPT | Mod: HCNC | Performed by: HOSPITALIST

## 2025-01-01 PROCEDURE — 93005 ELECTROCARDIOGRAM TRACING: CPT | Mod: HCNC

## 2025-01-01 PROCEDURE — 85014 HEMATOCRIT: CPT | Mod: HCNC | Performed by: INTERNAL MEDICINE

## 2025-01-01 PROCEDURE — 83036 HEMOGLOBIN GLYCOSYLATED A1C: CPT | Mod: HCNC | Performed by: HOSPITALIST

## 2025-01-01 PROCEDURE — 25000003 PHARM REV CODE 250: Mod: HCNC | Performed by: HOSPITALIST

## 2025-01-01 PROCEDURE — 25000242 PHARM REV CODE 250 ALT 637 W/ HCPCS: Mod: HCNC | Performed by: PHYSICIAN ASSISTANT

## 2025-01-01 PROCEDURE — 99498 ADVNCD CARE PLAN ADDL 30 MIN: CPT | Mod: HCNC,,, | Performed by: REGISTERED NURSE

## 2025-01-01 PROCEDURE — 84100 ASSAY OF PHOSPHORUS: CPT | Mod: HCNC | Performed by: HOSPITALIST

## 2025-01-01 PROCEDURE — 99900035 HC TECH TIME PER 15 MIN (STAT): Mod: HCNC

## 2025-01-01 PROCEDURE — 80048 BASIC METABOLIC PNL TOTAL CA: CPT | Mod: HCNC | Performed by: HOSPITALIST

## 2025-01-01 PROCEDURE — 63600175 PHARM REV CODE 636 W HCPCS: Mod: JZ,TB,HCNC

## 2025-01-01 PROCEDURE — 27100092 HC HIGH FLOW DELIVERY CANNULA: Mod: HCNC

## 2025-01-01 PROCEDURE — 83735 ASSAY OF MAGNESIUM: CPT | Mod: HCNC | Performed by: HOSPITALIST

## 2025-01-01 PROCEDURE — 99497 ADVNCD CARE PLAN 30 MIN: CPT | Mod: HCNC,25,, | Performed by: REGISTERED NURSE

## 2025-01-01 PROCEDURE — G0378 HOSPITAL OBSERVATION PER HR: HCPCS | Mod: HCNC

## 2025-01-01 PROCEDURE — 36415 COLL VENOUS BLD VENIPUNCTURE: CPT | Mod: HCNC

## 2025-01-01 PROCEDURE — 94761 N-INVAS EAR/PLS OXIMETRY MLT: CPT | Mod: HCNC

## 2025-01-01 PROCEDURE — 96372 THER/PROPH/DIAG INJ SC/IM: CPT

## 2025-01-01 PROCEDURE — 99223 1ST HOSP IP/OBS HIGH 75: CPT | Mod: HCNC,25,, | Performed by: REGISTERED NURSE

## 2025-01-01 PROCEDURE — 82803 BLOOD GASES ANY COMBINATION: CPT | Mod: HCNC

## 2025-01-01 PROCEDURE — 25000003 PHARM REV CODE 250: Mod: HCNC | Performed by: PHYSICIAN ASSISTANT

## 2025-01-01 PROCEDURE — 84100 ASSAY OF PHOSPHORUS: CPT | Mod: HCNC | Performed by: SURGERY

## 2025-01-01 PROCEDURE — 94761 N-INVAS EAR/PLS OXIMETRY MLT: CPT | Mod: HCNC,XB

## 2025-01-01 PROCEDURE — 99900031 HC PATIENT EDUCATION (STAT): Mod: HCNC

## 2025-01-01 PROCEDURE — 99233 SBSQ HOSP IP/OBS HIGH 50: CPT | Mod: HCNC,25,, | Performed by: REGISTERED NURSE

## 2025-01-01 PROCEDURE — 36415 COLL VENOUS BLD VENIPUNCTURE: CPT | Mod: HCNC | Performed by: INTERNAL MEDICINE

## 2025-01-01 PROCEDURE — 85025 COMPLETE CBC W/AUTO DIFF WBC: CPT | Mod: HCNC | Performed by: SURGERY

## 2025-01-01 PROCEDURE — C1751 CATH, INF, PER/CENT/MIDLINE: HCPCS | Mod: HCNC

## 2025-01-01 PROCEDURE — 82800 BLOOD PH: CPT | Mod: HCNC

## 2025-01-01 PROCEDURE — 94640 AIRWAY INHALATION TREATMENT: CPT | Mod: HCNC,XB

## 2025-01-01 PROCEDURE — 81001 URINALYSIS AUTO W/SCOPE: CPT | Mod: HCNC | Performed by: INTERNAL MEDICINE

## 2025-01-01 PROCEDURE — 83605 ASSAY OF LACTIC ACID: CPT | Mod: HCNC | Performed by: HOSPITALIST

## 2025-01-01 PROCEDURE — 99291 CRITICAL CARE FIRST HOUR: CPT | Mod: ,,, | Performed by: INTERNAL MEDICINE

## 2025-01-01 PROCEDURE — 27000190 HC CPAP FULL FACE MASK W/VALVE: Mod: HCNC

## 2025-01-01 PROCEDURE — 94799 UNLISTED PULMONARY SVC/PX: CPT | Mod: HCNC

## 2025-01-01 PROCEDURE — 5A09357 ASSISTANCE WITH RESPIRATORY VENTILATION, LESS THAN 24 CONSECUTIVE HOURS, CONTINUOUS POSITIVE AIRWAY PRESSURE: ICD-10-PCS | Performed by: HOSPITALIST

## 2025-01-01 PROCEDURE — 93010 ELECTROCARDIOGRAM REPORT: CPT | Mod: HCNC,,, | Performed by: INTERNAL MEDICINE

## 2025-01-01 PROCEDURE — 87040 BLOOD CULTURE FOR BACTERIA: CPT | Mod: HCNC | Performed by: INTERNAL MEDICINE

## 2025-01-01 PROCEDURE — 25000003 PHARM REV CODE 250: Mod: HCNC | Performed by: STUDENT IN AN ORGANIZED HEALTH CARE EDUCATION/TRAINING PROGRAM

## 2025-01-01 PROCEDURE — 51798 US URINE CAPACITY MEASURE: CPT | Mod: HCNC

## 2025-01-01 PROCEDURE — 27100171 HC OXYGEN HIGH FLOW UP TO 24 HOURS: Mod: HCNC

## 2025-01-01 PROCEDURE — 02HV33Z INSERTION OF INFUSION DEVICE INTO SUPERIOR VENA CAVA, PERCUTANEOUS APPROACH: ICD-10-PCS | Performed by: STUDENT IN AN ORGANIZED HEALTH CARE EDUCATION/TRAINING PROGRAM

## 2025-01-01 PROCEDURE — 82010 KETONE BODYS QUAN: CPT | Mod: HCNC | Performed by: INTERNAL MEDICINE

## 2025-01-01 PROCEDURE — 0W9930Z DRAINAGE OF RIGHT PLEURAL CAVITY WITH DRAINAGE DEVICE, PERCUTANEOUS APPROACH: ICD-10-PCS | Performed by: STUDENT IN AN ORGANIZED HEALTH CARE EDUCATION/TRAINING PROGRAM

## 2025-01-01 PROCEDURE — 25000242 PHARM REV CODE 250 ALT 637 W/ HCPCS: Mod: HCNC | Performed by: HOSPITALIST

## 2025-01-01 PROCEDURE — 25000003 PHARM REV CODE 250: Mod: HCNC | Performed by: SURGERY

## 2025-01-01 PROCEDURE — 63600175 PHARM REV CODE 636 W HCPCS: Mod: JZ,TB,HCNC | Performed by: STUDENT IN AN ORGANIZED HEALTH CARE EDUCATION/TRAINING PROGRAM

## 2025-01-01 PROCEDURE — 81003 URINALYSIS AUTO W/O SCOPE: CPT | Mod: HCNC | Performed by: SURGERY

## 2025-01-01 PROCEDURE — 63600175 PHARM REV CODE 636 W HCPCS: Mod: JZ,TB,HCNC | Performed by: INTERNAL MEDICINE

## 2025-01-01 PROCEDURE — 80053 COMPREHEN METABOLIC PANEL: CPT | Mod: HCNC | Performed by: PHYSICIAN ASSISTANT

## 2025-01-01 PROCEDURE — 82310 ASSAY OF CALCIUM: CPT | Mod: HCNC | Performed by: HOSPITALIST

## 2025-01-01 PROCEDURE — 82040 ASSAY OF SERUM ALBUMIN: CPT | Mod: HCNC | Performed by: STUDENT IN AN ORGANIZED HEALTH CARE EDUCATION/TRAINING PROGRAM

## 2025-01-01 PROCEDURE — 51701 INSERT BLADDER CATHETER: CPT | Mod: HCNC

## 2025-01-01 PROCEDURE — 84484 ASSAY OF TROPONIN QUANT: CPT | Mod: HCNC

## 2025-01-01 PROCEDURE — 36415 COLL VENOUS BLD VENIPUNCTURE: CPT | Mod: HCNC | Performed by: STUDENT IN AN ORGANIZED HEALTH CARE EDUCATION/TRAINING PROGRAM

## 2025-01-01 PROCEDURE — 27000249 HC VAPOTHERM CIRCUIT: Mod: HCNC

## 2025-01-01 PROCEDURE — 11000001 HC ACUTE MED/SURG PRIVATE ROOM: Mod: HCNC

## 2025-01-01 PROCEDURE — 96374 THER/PROPH/DIAG INJ IV PUSH: CPT | Mod: HCNC

## 2025-01-01 PROCEDURE — 36415 COLL VENOUS BLD VENIPUNCTURE: CPT | Mod: HCNC | Performed by: PHYSICIAN ASSISTANT

## 2025-01-01 PROCEDURE — 83735 ASSAY OF MAGNESIUM: CPT | Mod: HCNC | Performed by: STUDENT IN AN ORGANIZED HEALTH CARE EDUCATION/TRAINING PROGRAM

## 2025-01-01 PROCEDURE — 99292 CRITICAL CARE ADDL 30 MIN: CPT | Mod: HCNC,,, | Performed by: INTERNAL MEDICINE

## 2025-01-01 PROCEDURE — 85025 COMPLETE CBC W/AUTO DIFF WBC: CPT | Mod: HCNC | Performed by: STUDENT IN AN ORGANIZED HEALTH CARE EDUCATION/TRAINING PROGRAM

## 2025-01-01 PROCEDURE — 84484 ASSAY OF TROPONIN QUANT: CPT | Mod: HCNC | Performed by: STUDENT IN AN ORGANIZED HEALTH CARE EDUCATION/TRAINING PROGRAM

## 2025-01-01 RX ORDER — QUETIAPINE FUMARATE 25 MG/1
25 TABLET, FILM COATED ORAL 2 TIMES DAILY
Status: DISCONTINUED | OUTPATIENT
Start: 2025-01-01 | End: 2025-01-01

## 2025-01-01 RX ORDER — POTASSIUM CHLORIDE 14.9 MG/ML
60 INJECTION INTRAVENOUS
Status: DISCONTINUED | OUTPATIENT
Start: 2025-01-01 | End: 2025-01-01 | Stop reason: HOSPADM

## 2025-01-01 RX ORDER — FENTANYL CITRATE 50 UG/ML
INJECTION, SOLUTION INTRAMUSCULAR; INTRAVENOUS
Status: COMPLETED
Start: 2025-01-01 | End: 2025-01-01

## 2025-01-01 RX ORDER — SUCRALFATE 1 G/1
1 TABLET ORAL
Status: DISCONTINUED | OUTPATIENT
Start: 2025-01-01 | End: 2025-01-01

## 2025-01-01 RX ORDER — INSULIN ASPART 100 [IU]/ML
0-10 INJECTION, SOLUTION INTRAVENOUS; SUBCUTANEOUS EVERY 6 HOURS PRN
Status: DISCONTINUED | OUTPATIENT
Start: 2025-01-01 | End: 2025-01-01 | Stop reason: HOSPADM

## 2025-01-01 RX ORDER — IBUPROFEN 200 MG
16 TABLET ORAL
Status: DISCONTINUED | OUTPATIENT
Start: 2025-01-01 | End: 2025-01-01

## 2025-01-01 RX ORDER — FENTANYL CITRATE 50 UG/ML
50 INJECTION, SOLUTION INTRAMUSCULAR; INTRAVENOUS EVERY 30 MIN PRN
Refills: 0 | Status: DISCONTINUED | OUTPATIENT
Start: 2025-01-01 | End: 2025-01-01

## 2025-01-01 RX ORDER — CEFEPIME HYDROCHLORIDE 2 G/1
2 INJECTION, POWDER, FOR SOLUTION INTRAVENOUS
Status: DISCONTINUED | OUTPATIENT
Start: 2025-01-01 | End: 2025-01-01

## 2025-01-01 RX ORDER — ACETAMINOPHEN 325 MG/1
650 TABLET ORAL EVERY 4 HOURS PRN
Status: DISCONTINUED | OUTPATIENT
Start: 2025-01-01 | End: 2025-01-01 | Stop reason: HOSPADM

## 2025-01-01 RX ORDER — AMLODIPINE BESYLATE 5 MG/1
10 TABLET ORAL DAILY
Status: DISCONTINUED | OUTPATIENT
Start: 2025-01-01 | End: 2025-01-01

## 2025-01-01 RX ORDER — MAGNESIUM SULFATE HEPTAHYDRATE 40 MG/ML
2 INJECTION, SOLUTION INTRAVENOUS
Status: DISCONTINUED | OUTPATIENT
Start: 2025-01-01 | End: 2025-01-01 | Stop reason: HOSPADM

## 2025-01-01 RX ORDER — MORPHINE SULFATE 4 MG/ML
4 INJECTION, SOLUTION INTRAMUSCULAR; INTRAVENOUS EVERY 4 HOURS PRN
Status: DISCONTINUED | OUTPATIENT
Start: 2025-01-01 | End: 2025-01-01

## 2025-01-01 RX ORDER — ENOXAPARIN SODIUM 100 MG/ML
40 INJECTION SUBCUTANEOUS EVERY 24 HOURS
Status: DISCONTINUED | OUTPATIENT
Start: 2025-01-01 | End: 2025-01-01

## 2025-01-01 RX ORDER — KETOROLAC TROMETHAMINE 30 MG/ML
15 INJECTION, SOLUTION INTRAMUSCULAR; INTRAVENOUS EVERY 6 HOURS
Status: DISCONTINUED | OUTPATIENT
Start: 2025-01-01 | End: 2025-01-01

## 2025-01-01 RX ORDER — LIDOCAINE 50 MG/G
3 PATCH TOPICAL
Status: DISCONTINUED | OUTPATIENT
Start: 2025-01-01 | End: 2025-01-01 | Stop reason: HOSPADM

## 2025-01-01 RX ORDER — TRAZODONE HYDROCHLORIDE 50 MG/1
50 TABLET ORAL NIGHTLY PRN
Status: DISCONTINUED | OUTPATIENT
Start: 2025-01-01 | End: 2025-01-01

## 2025-01-01 RX ORDER — MUPIROCIN 20 MG/G
OINTMENT TOPICAL 2 TIMES DAILY
Status: COMPLETED | OUTPATIENT
Start: 2025-01-01 | End: 2025-01-01

## 2025-01-01 RX ORDER — HYDRALAZINE HYDROCHLORIDE 20 MG/ML
10 INJECTION INTRAMUSCULAR; INTRAVENOUS EVERY 6 HOURS PRN
Status: DISCONTINUED | OUTPATIENT
Start: 2025-01-01 | End: 2025-01-01 | Stop reason: HOSPADM

## 2025-01-01 RX ORDER — MORPHINE SULFATE 4 MG/ML
4 INJECTION, SOLUTION INTRAMUSCULAR; INTRAVENOUS
Status: DISCONTINUED | OUTPATIENT
Start: 2025-01-01 | End: 2025-01-01

## 2025-01-01 RX ORDER — ACETAMINOPHEN 650 MG/20.3ML
1000 LIQUID ORAL EVERY 6 HOURS
Status: DISCONTINUED | OUTPATIENT
Start: 2025-01-01 | End: 2025-01-01

## 2025-01-01 RX ORDER — INSULIN GLARGINE 100 [IU]/ML
5 INJECTION, SOLUTION SUBCUTANEOUS NIGHTLY
Status: DISCONTINUED | OUTPATIENT
Start: 2025-01-01 | End: 2025-01-01

## 2025-01-01 RX ORDER — TIZANIDINE 2 MG/1
2 TABLET ORAL EVERY 8 HOURS PRN
Status: DISCONTINUED | OUTPATIENT
Start: 2025-01-01 | End: 2025-01-01

## 2025-01-01 RX ORDER — MAGNESIUM SULFATE HEPTAHYDRATE 40 MG/ML
2 INJECTION, SOLUTION INTRAVENOUS ONCE
Status: COMPLETED | OUTPATIENT
Start: 2025-01-01 | End: 2025-01-01

## 2025-01-01 RX ORDER — FENTANYL CITRATE 50 UG/ML
25 INJECTION, SOLUTION INTRAMUSCULAR; INTRAVENOUS
Refills: 0 | Status: DISCONTINUED | OUTPATIENT
Start: 2025-01-01 | End: 2025-01-01

## 2025-01-01 RX ORDER — SODIUM CHLORIDE 450 MG/100ML
INJECTION, SOLUTION INTRAVENOUS CONTINUOUS
Status: DISCONTINUED | OUTPATIENT
Start: 2025-01-01 | End: 2025-01-01

## 2025-01-01 RX ORDER — OXYCODONE HYDROCHLORIDE 5 MG/1
5 TABLET ORAL EVERY 4 HOURS PRN
Refills: 0 | Status: DISCONTINUED | OUTPATIENT
Start: 2025-01-01 | End: 2025-01-01

## 2025-01-01 RX ORDER — OXYCODONE HCL 5 MG/5 ML
10 SOLUTION, ORAL ORAL 2 TIMES DAILY
Refills: 0 | Status: DISCONTINUED | OUTPATIENT
Start: 2025-01-01 | End: 2025-01-01

## 2025-01-01 RX ORDER — METHYLPREDNISOLONE SOD SUCC 125 MG
125 VIAL (EA) INJECTION DAILY
Status: DISCONTINUED | OUTPATIENT
Start: 2025-01-01 | End: 2025-01-01

## 2025-01-01 RX ORDER — ACETAMINOPHEN 650 MG/1
650 SUPPOSITORY RECTAL EVERY 8 HOURS
Status: DISCONTINUED | OUTPATIENT
Start: 2025-01-01 | End: 2025-01-01

## 2025-01-01 RX ORDER — TALC
6 POWDER (GRAM) TOPICAL NIGHTLY PRN
Status: DISCONTINUED | OUTPATIENT
Start: 2025-01-01 | End: 2025-01-01

## 2025-01-01 RX ORDER — CARVEDILOL 12.5 MG/1
25 TABLET ORAL 2 TIMES DAILY
Status: DISCONTINUED | OUTPATIENT
Start: 2025-01-01 | End: 2025-01-01

## 2025-01-01 RX ORDER — METHOCARBAMOL 500 MG/1
1000 TABLET, FILM COATED ORAL 4 TIMES DAILY
Status: DISCONTINUED | OUTPATIENT
Start: 2025-01-01 | End: 2025-01-01

## 2025-01-01 RX ORDER — QUETIAPINE FUMARATE 25 MG/1
50 TABLET, FILM COATED ORAL 2 TIMES DAILY
Status: DISCONTINUED | OUTPATIENT
Start: 2025-01-01 | End: 2025-01-01

## 2025-01-01 RX ORDER — MORPHINE SULFATE 4 MG/ML
1 INJECTION, SOLUTION INTRAMUSCULAR; INTRAVENOUS EVERY 4 HOURS PRN
Status: DISCONTINUED | OUTPATIENT
Start: 2025-01-01 | End: 2025-01-01

## 2025-01-01 RX ORDER — ARFORMOTEROL TARTRATE 15 UG/2ML
15 SOLUTION RESPIRATORY (INHALATION) 2 TIMES DAILY
Status: DISCONTINUED | OUTPATIENT
Start: 2025-01-01 | End: 2025-01-01 | Stop reason: HOSPADM

## 2025-01-01 RX ORDER — ONDANSETRON HYDROCHLORIDE 2 MG/ML
4 INJECTION, SOLUTION INTRAVENOUS EVERY 8 HOURS PRN
Status: DISCONTINUED | OUTPATIENT
Start: 2025-01-01 | End: 2025-01-01 | Stop reason: HOSPADM

## 2025-01-01 RX ORDER — LIDOCAINE 50 MG/G
2 PATCH TOPICAL
Status: DISCONTINUED | OUTPATIENT
Start: 2025-01-01 | End: 2025-01-01

## 2025-01-01 RX ORDER — NAPROXEN SODIUM 220 MG/1
81 TABLET, FILM COATED ORAL DAILY
Status: DISCONTINUED | OUTPATIENT
Start: 2025-01-01 | End: 2025-01-01

## 2025-01-01 RX ORDER — CALCIUM GLUCONATE 20 MG/ML
1 INJECTION, SOLUTION INTRAVENOUS
Status: DISCONTINUED | OUTPATIENT
Start: 2025-01-01 | End: 2025-01-01 | Stop reason: HOSPADM

## 2025-01-01 RX ORDER — ATROPINE SULFATE 10 MG/ML
2 SOLUTION/ DROPS OPHTHALMIC EVERY 4 HOURS PRN
Status: DISCONTINUED | OUTPATIENT
Start: 2025-01-01 | End: 2025-01-01 | Stop reason: HOSPADM

## 2025-01-01 RX ORDER — INSULIN GLARGINE 100 [IU]/ML
15 INJECTION, SOLUTION SUBCUTANEOUS DAILY
Status: DISCONTINUED | OUTPATIENT
Start: 2025-01-01 | End: 2025-01-01 | Stop reason: HOSPADM

## 2025-01-01 RX ORDER — LIDOCAINE HYDROCHLORIDE 10 MG/ML
INJECTION, SOLUTION INFILTRATION; PERINEURAL
Status: DISCONTINUED
Start: 2025-01-01 | End: 2025-01-01 | Stop reason: WASHOUT

## 2025-01-01 RX ORDER — INSULIN ASPART 100 [IU]/ML
0-5 INJECTION, SOLUTION INTRAVENOUS; SUBCUTANEOUS
Status: DISCONTINUED | OUTPATIENT
Start: 2025-01-01 | End: 2025-01-01

## 2025-01-01 RX ORDER — SODIUM CHLORIDE 450 MG/100ML
INJECTION, SOLUTION INTRAVENOUS CONTINUOUS
Status: ACTIVE | OUTPATIENT
Start: 2025-01-01 | End: 2025-01-01

## 2025-01-01 RX ORDER — FUROSEMIDE 20 MG/1
20 TABLET ORAL DAILY
Status: DISCONTINUED | OUTPATIENT
Start: 2025-01-01 | End: 2025-01-01

## 2025-01-01 RX ORDER — ACETAMINOPHEN 650 MG/20.3ML
650 LIQUID ORAL EVERY 6 HOURS
Status: DISCONTINUED | OUTPATIENT
Start: 2025-01-01 | End: 2025-01-01

## 2025-01-01 RX ORDER — HYDROMORPHONE HYDROCHLORIDE 1 MG/ML
1 INJECTION, SOLUTION INTRAMUSCULAR; INTRAVENOUS; SUBCUTANEOUS ONCE
Status: COMPLETED | OUTPATIENT
Start: 2025-01-01 | End: 2025-01-01

## 2025-01-01 RX ORDER — SODIUM CHLORIDE 0.9 % (FLUSH) 0.9 %
10 SYRINGE (ML) INJECTION
Status: DISCONTINUED | OUTPATIENT
Start: 2025-01-01 | End: 2025-01-01 | Stop reason: HOSPADM

## 2025-01-01 RX ORDER — NALOXONE HCL 0.4 MG/ML
0.02 VIAL (ML) INJECTION
Status: DISCONTINUED | OUTPATIENT
Start: 2025-01-01 | End: 2025-01-01 | Stop reason: HOSPADM

## 2025-01-01 RX ORDER — FENTANYL CITRATE-0.9 % NACL/PF 10 MCG/ML
PLASTIC BAG, INJECTION (ML) INTRAVENOUS CONTINUOUS
Refills: 0 | Status: CANCELLED | OUTPATIENT
Start: 2025-01-01

## 2025-01-01 RX ORDER — FENTANYL CITRATE 50 UG/ML
50 INJECTION, SOLUTION INTRAMUSCULAR; INTRAVENOUS
Status: DISCONTINUED | OUTPATIENT
Start: 2025-01-01 | End: 2025-01-01

## 2025-01-01 RX ORDER — LORAZEPAM 2 MG/ML
1 INJECTION INTRAMUSCULAR EVERY 8 HOURS
Status: DISCONTINUED | OUTPATIENT
Start: 2025-01-01 | End: 2025-01-01

## 2025-01-01 RX ORDER — FENTANYL CITRATE 50 UG/ML
25 INJECTION, SOLUTION INTRAMUSCULAR; INTRAVENOUS
Status: DISCONTINUED | OUTPATIENT
Start: 2025-01-01 | End: 2025-01-01

## 2025-01-01 RX ORDER — FUROSEMIDE 10 MG/ML
40 INJECTION INTRAMUSCULAR; INTRAVENOUS ONCE
Status: COMPLETED | OUTPATIENT
Start: 2025-01-01 | End: 2025-01-01

## 2025-01-01 RX ORDER — HYDROMORPHONE HYDROCHLORIDE 1 MG/ML
0.2 INJECTION, SOLUTION INTRAMUSCULAR; INTRAVENOUS; SUBCUTANEOUS EVERY 4 HOURS PRN
Status: DISCONTINUED | OUTPATIENT
Start: 2025-01-01 | End: 2025-01-01

## 2025-01-01 RX ORDER — OXYCODONE HYDROCHLORIDE 5 MG/1
10 TABLET ORAL EVERY 4 HOURS PRN
Refills: 0 | Status: DISCONTINUED | OUTPATIENT
Start: 2025-01-01 | End: 2025-01-01

## 2025-01-01 RX ORDER — PANTOPRAZOLE SODIUM 40 MG/10ML
40 INJECTION, POWDER, LYOPHILIZED, FOR SOLUTION INTRAVENOUS 2 TIMES DAILY
Status: DISCONTINUED | OUTPATIENT
Start: 2025-01-01 | End: 2025-01-01 | Stop reason: HOSPADM

## 2025-01-01 RX ORDER — TRIPROLIDINE/PSEUDOEPHEDRINE 2.5MG-60MG
800 TABLET ORAL EVERY 8 HOURS
Status: DISCONTINUED | OUTPATIENT
Start: 2025-01-01 | End: 2025-01-01

## 2025-01-01 RX ORDER — OLANZAPINE 10 MG/2ML
10 INJECTION, POWDER, FOR SOLUTION INTRAMUSCULAR NIGHTLY
Status: DISCONTINUED | OUTPATIENT
Start: 2025-01-01 | End: 2025-01-01

## 2025-01-01 RX ORDER — MORPHINE SULFATE 4 MG/ML
2 INJECTION, SOLUTION INTRAMUSCULAR; INTRAVENOUS
Status: DISCONTINUED | OUTPATIENT
Start: 2025-01-01 | End: 2025-01-01

## 2025-01-01 RX ORDER — IPRATROPIUM BROMIDE AND ALBUTEROL SULFATE 2.5; .5 MG/3ML; MG/3ML
3 SOLUTION RESPIRATORY (INHALATION) EVERY 4 HOURS PRN
Status: DISCONTINUED | OUTPATIENT
Start: 2025-01-01 | End: 2025-01-01

## 2025-01-01 RX ORDER — NOREPINEPHRINE BITARTRATE/D5W 4MG/250ML
0-.2 PLASTIC BAG, INJECTION (ML) INTRAVENOUS CONTINUOUS
Status: DISCONTINUED | OUTPATIENT
Start: 2025-01-01 | End: 2025-01-01

## 2025-01-01 RX ORDER — FAMOTIDINE 20 MG/1
20 TABLET, FILM COATED ORAL 2 TIMES DAILY
Status: DISCONTINUED | OUTPATIENT
Start: 2025-01-01 | End: 2025-01-01

## 2025-01-01 RX ORDER — POTASSIUM CHLORIDE 29.8 MG/ML
80 INJECTION INTRAVENOUS
Status: DISCONTINUED | OUTPATIENT
Start: 2025-01-01 | End: 2025-01-01 | Stop reason: HOSPADM

## 2025-01-01 RX ORDER — OXYBUTYNIN CHLORIDE 5 MG/1
10 TABLET, EXTENDED RELEASE ORAL DAILY
Status: DISCONTINUED | OUTPATIENT
Start: 2025-01-01 | End: 2025-01-01

## 2025-01-01 RX ORDER — HYDROMORPHONE HYDROCHLORIDE 1 MG/ML
0.5 INJECTION, SOLUTION INTRAMUSCULAR; INTRAVENOUS; SUBCUTANEOUS EVERY 4 HOURS PRN
Status: DISCONTINUED | OUTPATIENT
Start: 2025-01-01 | End: 2025-01-01

## 2025-01-01 RX ORDER — ENOXAPARIN SODIUM 100 MG/ML
30 INJECTION SUBCUTANEOUS EVERY 12 HOURS
Status: DISCONTINUED | OUTPATIENT
Start: 2025-01-01 | End: 2025-01-01

## 2025-01-01 RX ORDER — MORPHINE SULFATE 4 MG/ML
6 INJECTION, SOLUTION INTRAMUSCULAR; INTRAVENOUS EVERY 4 HOURS PRN
Status: DISCONTINUED | OUTPATIENT
Start: 2025-01-01 | End: 2025-01-01

## 2025-01-01 RX ORDER — TIZANIDINE 2 MG/1
2 TABLET ORAL EVERY 8 HOURS
Status: DISCONTINUED | OUTPATIENT
Start: 2025-01-01 | End: 2025-01-01

## 2025-01-01 RX ORDER — HALOPERIDOL LACTATE 5 MG/ML
1 INJECTION, SOLUTION INTRAMUSCULAR EVERY 4 HOURS PRN
Status: DISCONTINUED | OUTPATIENT
Start: 2025-01-01 | End: 2025-01-01 | Stop reason: HOSPADM

## 2025-01-01 RX ORDER — FENTANYL CITRATE 50 UG/ML
50 INJECTION, SOLUTION INTRAMUSCULAR; INTRAVENOUS ONCE
Refills: 0 | Status: COMPLETED | OUTPATIENT
Start: 2025-01-01 | End: 2025-01-01

## 2025-01-01 RX ORDER — POLYETHYLENE GLYCOL 3350 17 G/17G
17 POWDER, FOR SOLUTION ORAL DAILY PRN
Status: DISCONTINUED | OUTPATIENT
Start: 2025-01-01 | End: 2025-01-01

## 2025-01-01 RX ORDER — HALOPERIDOL LACTATE 5 MG/ML
5 INJECTION, SOLUTION INTRAMUSCULAR EVERY 6 HOURS PRN
Status: DISCONTINUED | OUTPATIENT
Start: 2025-01-01 | End: 2025-01-01

## 2025-01-01 RX ORDER — MORPHINE SULFATE 4 MG/ML
3 INJECTION, SOLUTION INTRAMUSCULAR; INTRAVENOUS EVERY 4 HOURS PRN
Status: DISCONTINUED | OUTPATIENT
Start: 2025-01-01 | End: 2025-01-01

## 2025-01-01 RX ORDER — MORPHINE SULFATE 4 MG/ML
2 INJECTION, SOLUTION INTRAMUSCULAR; INTRAVENOUS EVERY 4 HOURS PRN
Status: DISCONTINUED | OUTPATIENT
Start: 2025-01-01 | End: 2025-01-01

## 2025-01-01 RX ORDER — LIDOCAINE 50 MG/G
1 PATCH TOPICAL
Status: DISCONTINUED | OUTPATIENT
Start: 2025-01-01 | End: 2025-01-01

## 2025-01-01 RX ORDER — POTASSIUM CHLORIDE 29.8 MG/ML
40 INJECTION INTRAVENOUS
Status: DISCONTINUED | OUTPATIENT
Start: 2025-01-01 | End: 2025-01-01 | Stop reason: HOSPADM

## 2025-01-01 RX ORDER — CALCIUM GLUCONATE 20 MG/ML
2 INJECTION, SOLUTION INTRAVENOUS
Status: DISCONTINUED | OUTPATIENT
Start: 2025-01-01 | End: 2025-01-01 | Stop reason: HOSPADM

## 2025-01-01 RX ORDER — MORPHINE SULFATE 4 MG/ML
4 INJECTION, SOLUTION INTRAMUSCULAR; INTRAVENOUS
Refills: 0 | Status: COMPLETED | OUTPATIENT
Start: 2025-01-01 | End: 2025-01-01

## 2025-01-01 RX ORDER — PROMETHAZINE HYDROCHLORIDE AND CODEINE PHOSPHATE 6.25; 1 MG/5ML; MG/5ML
5 SOLUTION ORAL EVERY 4 HOURS PRN
Status: DISCONTINUED | OUTPATIENT
Start: 2025-01-01 | End: 2025-01-01

## 2025-01-01 RX ORDER — LATANOPROST 50 UG/ML
1 SOLUTION/ DROPS OPHTHALMIC NIGHTLY
Status: DISCONTINUED | OUTPATIENT
Start: 2025-01-01 | End: 2025-01-01 | Stop reason: HOSPADM

## 2025-01-01 RX ORDER — QUETIAPINE FUMARATE 25 MG/1
25 TABLET, FILM COATED ORAL NIGHTLY
Status: DISCONTINUED | OUTPATIENT
Start: 2025-01-01 | End: 2025-01-01

## 2025-01-01 RX ORDER — DEXAMETHASONE SODIUM PHOSPHATE 10 MG/ML
INJECTION, SOLUTION INTRA-ARTICULAR; INTRALESIONAL; INTRAMUSCULAR; INTRAVENOUS; SOFT TISSUE
Status: DISPENSED
Start: 2025-01-01 | End: 2025-01-01

## 2025-01-01 RX ORDER — LIDOCAINE HYDROCHLORIDE 20 MG/ML
INJECTION INTRAVENOUS
Status: DISCONTINUED
Start: 2025-01-01 | End: 2025-01-01 | Stop reason: WASHOUT

## 2025-01-01 RX ORDER — MORPHINE SULFATE 4 MG/ML
4 INJECTION, SOLUTION INTRAMUSCULAR; INTRAVENOUS ONCE
Status: COMPLETED | OUTPATIENT
Start: 2025-01-01 | End: 2025-01-01

## 2025-01-01 RX ORDER — KETOROLAC TROMETHAMINE 30 MG/ML
15 INJECTION, SOLUTION INTRAMUSCULAR; INTRAVENOUS
Status: COMPLETED | OUTPATIENT
Start: 2025-01-01 | End: 2025-01-01

## 2025-01-01 RX ORDER — LORAZEPAM 0.5 MG/1
1 TABLET ORAL EVERY 6 HOURS PRN
Status: DISCONTINUED | OUTPATIENT
Start: 2025-01-01 | End: 2025-01-01

## 2025-01-01 RX ORDER — MORPHINE SULFATE 4 MG/ML
2 INJECTION, SOLUTION INTRAMUSCULAR; INTRAVENOUS ONCE
Status: COMPLETED | OUTPATIENT
Start: 2025-01-01 | End: 2025-01-01

## 2025-01-01 RX ORDER — LORAZEPAM 0.5 MG/1
0.5 TABLET ORAL EVERY 4 HOURS PRN
Status: DISCONTINUED | OUTPATIENT
Start: 2025-01-01 | End: 2025-01-01

## 2025-01-01 RX ORDER — LORAZEPAM 2 MG/ML
1 INJECTION INTRAMUSCULAR EVERY 4 HOURS PRN
Status: DISCONTINUED | OUTPATIENT
Start: 2025-01-01 | End: 2025-01-01 | Stop reason: HOSPADM

## 2025-01-01 RX ORDER — NOREPINEPHRINE BITARTRATE/D5W 4MG/250ML
0-3 PLASTIC BAG, INJECTION (ML) INTRAVENOUS CONTINUOUS
Status: DISCONTINUED | OUTPATIENT
Start: 2025-01-01 | End: 2025-01-01

## 2025-01-01 RX ORDER — MYCOPHENOLATE MOFETIL 250 MG/1
1000 CAPSULE ORAL 2 TIMES DAILY
Status: DISCONTINUED | OUTPATIENT
Start: 2025-01-01 | End: 2025-01-01

## 2025-01-01 RX ORDER — BUDESONIDE 0.5 MG/2ML
1 INHALANT ORAL 2 TIMES DAILY
Status: DISCONTINUED | OUTPATIENT
Start: 2025-01-01 | End: 2025-01-01 | Stop reason: HOSPADM

## 2025-01-01 RX ORDER — METOPROLOL SUCCINATE 50 MG/1
50 TABLET, EXTENDED RELEASE ORAL DAILY
Status: DISCONTINUED | OUTPATIENT
Start: 2025-01-01 | End: 2025-01-01

## 2025-01-01 RX ORDER — ACETAMINOPHEN 500 MG
1000 TABLET ORAL EVERY 8 HOURS
Status: DISCONTINUED | OUTPATIENT
Start: 2025-01-01 | End: 2025-01-01

## 2025-01-01 RX ORDER — GLUCAGON 1 MG
1 KIT INJECTION
Status: DISCONTINUED | OUTPATIENT
Start: 2025-01-01 | End: 2025-01-01 | Stop reason: HOSPADM

## 2025-01-01 RX ORDER — AMLODIPINE BESYLATE 5 MG/1
5 TABLET ORAL DAILY
Status: DISCONTINUED | OUTPATIENT
Start: 2025-01-01 | End: 2025-01-01

## 2025-01-01 RX ORDER — HYDROMORPHONE HYDROCHLORIDE 1 MG/ML
2 INJECTION, SOLUTION INTRAMUSCULAR; INTRAVENOUS; SUBCUTANEOUS
Status: DISCONTINUED | OUTPATIENT
Start: 2025-01-01 | End: 2025-01-01 | Stop reason: HOSPADM

## 2025-01-01 RX ORDER — NOREPINEPHRINE BITARTRATE/D5W 4MG/250ML
0-1 PLASTIC BAG, INJECTION (ML) INTRAVENOUS CONTINUOUS
Status: DISCONTINUED | OUTPATIENT
Start: 2025-01-01 | End: 2025-01-01

## 2025-01-01 RX ORDER — CALCIUM GLUCONATE 20 MG/ML
3 INJECTION, SOLUTION INTRAVENOUS
Status: DISCONTINUED | OUTPATIENT
Start: 2025-01-01 | End: 2025-01-01 | Stop reason: HOSPADM

## 2025-01-01 RX ORDER — IBUPROFEN 200 MG
24 TABLET ORAL
Status: DISCONTINUED | OUTPATIENT
Start: 2025-01-01 | End: 2025-01-01

## 2025-01-01 RX ORDER — CEFEPIME HYDROCHLORIDE 2 G/1
2 INJECTION, POWDER, FOR SOLUTION INTRAVENOUS
Status: COMPLETED | OUTPATIENT
Start: 2025-01-01 | End: 2025-01-01

## 2025-01-01 RX ORDER — MORPHINE SULFATE 4 MG/ML
4 INJECTION, SOLUTION INTRAMUSCULAR; INTRAVENOUS EVERY 4 HOURS PRN
Refills: 0 | Status: DISCONTINUED | OUTPATIENT
Start: 2025-01-01 | End: 2025-01-01

## 2025-01-01 RX ORDER — LORAZEPAM 2 MG/ML
1 INJECTION INTRAMUSCULAR EVERY 4 HOURS PRN
Status: DISCONTINUED | OUTPATIENT
Start: 2025-01-01 | End: 2025-01-01

## 2025-01-01 RX ORDER — FENTANYL CITRATE-0.9 % NACL/PF 10 MCG/ML
0-250 PLASTIC BAG, INJECTION (ML) INTRAVENOUS CONTINUOUS
Refills: 0 | Status: DISCONTINUED | OUTPATIENT
Start: 2025-01-01 | End: 2025-01-01

## 2025-01-01 RX ORDER — FENTANYL 50 UG/1
1 PATCH TRANSDERMAL
Refills: 0 | Status: DISCONTINUED | OUTPATIENT
Start: 2025-01-01 | End: 2025-01-01

## 2025-01-01 RX ORDER — SIMETHICONE 80 MG
80 TABLET,CHEWABLE ORAL 3 TIMES DAILY PRN
Status: DISCONTINUED | OUTPATIENT
Start: 2025-01-01 | End: 2025-01-01

## 2025-01-01 RX ORDER — OLANZAPINE 10 MG/2ML
10 INJECTION, POWDER, FOR SOLUTION INTRAMUSCULAR ONCE AS NEEDED
Status: COMPLETED | OUTPATIENT
Start: 2025-01-01 | End: 2025-01-01

## 2025-01-01 RX ORDER — KETOROLAC TROMETHAMINE 30 MG/ML
15 INJECTION, SOLUTION INTRAMUSCULAR; INTRAVENOUS EVERY 8 HOURS
Status: COMPLETED | OUTPATIENT
Start: 2025-01-01 | End: 2025-01-01

## 2025-01-01 RX ORDER — DIPHENHYDRAMINE HCL 25 MG
25 CAPSULE ORAL EVERY 6 HOURS PRN
Status: DISCONTINUED | OUTPATIENT
Start: 2025-01-01 | End: 2025-01-01 | Stop reason: HOSPADM

## 2025-01-01 RX ORDER — INSULIN ASPART 100 [IU]/ML
5 INJECTION, SOLUTION INTRAVENOUS; SUBCUTANEOUS EVERY 6 HOURS
Status: DISCONTINUED | OUTPATIENT
Start: 2025-01-01 | End: 2025-01-01

## 2025-01-01 RX ORDER — FAMOTIDINE 20 MG/1
20 TABLET, FILM COATED ORAL DAILY
Status: DISCONTINUED | OUTPATIENT
Start: 2025-01-01 | End: 2025-01-01

## 2025-01-01 RX ORDER — DEXMEDETOMIDINE HYDROCHLORIDE 4 UG/ML
0-1.4 INJECTION, SOLUTION INTRAVENOUS CONTINUOUS
Status: DISCONTINUED | OUTPATIENT
Start: 2025-01-01 | End: 2025-01-01 | Stop reason: HOSPADM

## 2025-01-01 RX ADMIN — CEFEPIME 2 G: 2 INJECTION, POWDER, FOR SOLUTION INTRAVENOUS at 11:04

## 2025-01-01 RX ADMIN — BUDESONIDE 1 MG: 0.5 INHALANT RESPIRATORY (INHALATION) at 07:04

## 2025-01-01 RX ADMIN — ACETAMINOPHEN 1000 MG: 500 TABLET ORAL at 05:04

## 2025-01-01 RX ADMIN — ARFORMOTEROL TARTRATE 15 MCG: 15 SOLUTION RESPIRATORY (INHALATION) at 07:04

## 2025-01-01 RX ADMIN — LATANOPROST 1 DROP: 50 SOLUTION OPHTHALMIC at 10:04

## 2025-01-01 RX ADMIN — MORPHINE SULFATE 4 MG: 4 INJECTION INTRAVENOUS at 10:05

## 2025-01-01 RX ADMIN — FAMOTIDINE 20 MG: 20 TABLET, FILM COATED ORAL at 09:04

## 2025-01-01 RX ADMIN — MORPHINE SULFATE 4 MG: 4 INJECTION INTRAVENOUS at 03:04

## 2025-01-01 RX ADMIN — IBUPROFEN 800 MG: 100 SUSPENSION ORAL at 01:05

## 2025-01-01 RX ADMIN — CEFEPIME 2 G: 2 INJECTION, POWDER, FOR SOLUTION INTRAVENOUS at 01:05

## 2025-01-01 RX ADMIN — MAGNESIUM SULFATE HEPTAHYDRATE 2 G: 40 INJECTION, SOLUTION INTRAVENOUS at 09:05

## 2025-01-01 RX ADMIN — BUDESONIDE 1 MG: 0.5 INHALANT RESPIRATORY (INHALATION) at 07:05

## 2025-01-01 RX ADMIN — FENTANYL 1 PATCH: 50 PATCH TRANSDERMAL at 09:05

## 2025-01-01 RX ADMIN — IPRATROPIUM BROMIDE AND ALBUTEROL SULFATE 3 ML: 2.5; .5 SOLUTION RESPIRATORY (INHALATION) at 07:05

## 2025-01-01 RX ADMIN — PANTOPRAZOLE SODIUM 40 MG: 40 INJECTION, POWDER, FOR SOLUTION INTRAVENOUS at 11:05

## 2025-01-01 RX ADMIN — ENOXAPARIN SODIUM 40 MG: 40 INJECTION SUBCUTANEOUS at 06:04

## 2025-01-01 RX ADMIN — MUPIROCIN: 20 OINTMENT TOPICAL at 08:05

## 2025-01-01 RX ADMIN — ARFORMOTEROL TARTRATE 15 MCG: 15 SOLUTION RESPIRATORY (INHALATION) at 07:05

## 2025-01-01 RX ADMIN — ENOXAPARIN SODIUM 30 MG: 30 INJECTION SUBCUTANEOUS at 08:05

## 2025-01-01 RX ADMIN — DEXMEDETOMIDINE HYDROCHLORIDE 0.2 MCG/KG/HR: 4 INJECTION INTRAVENOUS at 12:05

## 2025-01-01 RX ADMIN — ACETAMINOPHEN 650 MG: 325 TABLET ORAL at 08:05

## 2025-01-01 RX ADMIN — LORAZEPAM 0.5 MG: 0.5 TABLET ORAL at 12:04

## 2025-01-01 RX ADMIN — KETOROLAC TROMETHAMINE 15 MG: 30 INJECTION, SOLUTION INTRAMUSCULAR; INTRAVENOUS at 01:05

## 2025-01-01 RX ADMIN — INSULIN ASPART 2 UNITS: 100 INJECTION, SOLUTION INTRAVENOUS; SUBCUTANEOUS at 05:05

## 2025-01-01 RX ADMIN — MORPHINE SULFATE 4 MG: 4 INJECTION INTRAVENOUS at 05:04

## 2025-01-01 RX ADMIN — SODIUM CHLORIDE 1000 MG: 9 INJECTION, SOLUTION INTRAVENOUS at 09:05

## 2025-01-01 RX ADMIN — MORPHINE SULFATE 4 MG: 4 INJECTION INTRAVENOUS at 09:04

## 2025-01-01 RX ADMIN — INSULIN ASPART 2 UNITS: 100 INJECTION, SOLUTION INTRAVENOUS; SUBCUTANEOUS at 12:05

## 2025-01-01 RX ADMIN — FUROSEMIDE 20 MG: 20 TABLET ORAL at 10:04

## 2025-01-01 RX ADMIN — ENOXAPARIN SODIUM 30 MG: 30 INJECTION SUBCUTANEOUS at 09:05

## 2025-01-01 RX ADMIN — DEXMEDETOMIDINE HYDROCHLORIDE 0.3 MCG/KG/HR: 4 INJECTION INTRAVENOUS at 06:04

## 2025-01-01 RX ADMIN — OXYCODONE 5 MG: 5 TABLET ORAL at 04:04

## 2025-01-01 RX ADMIN — DEXMEDETOMIDINE HYDROCHLORIDE 1.1 MCG/KG/HR: 4 INJECTION INTRAVENOUS at 11:05

## 2025-01-01 RX ADMIN — CEFEPIME 2 G: 2 INJECTION, POWDER, FOR SOLUTION INTRAVENOUS at 04:05

## 2025-01-01 RX ADMIN — MORPHINE SULFATE 4 MG: 4 INJECTION INTRAVENOUS at 04:04

## 2025-01-01 RX ADMIN — LORAZEPAM 0.5 MG: 0.5 TABLET ORAL at 05:04

## 2025-01-01 RX ADMIN — ACETAMINOPHEN 1000 MG: 500 TABLET ORAL at 09:04

## 2025-01-01 RX ADMIN — NOREPINEPHRINE BITARTRATE 0.7 MCG/KG/MIN: 4 INJECTION, SOLUTION INTRAVENOUS at 10:05

## 2025-01-01 RX ADMIN — INSULIN ASPART 1 UNITS: 100 INJECTION, SOLUTION INTRAVENOUS; SUBCUTANEOUS at 11:05

## 2025-01-01 RX ADMIN — METOPROLOL SUCCINATE 50 MG: 50 TABLET, EXTENDED RELEASE ORAL at 10:04

## 2025-01-01 RX ADMIN — MORPHINE SULFATE 4 MG: 4 INJECTION INTRAVENOUS at 06:04

## 2025-01-01 RX ADMIN — BUDESONIDE 1 MG: 0.5 INHALANT RESPIRATORY (INHALATION) at 08:04

## 2025-01-01 RX ADMIN — ARFORMOTEROL TARTRATE 15 MCG: 15 SOLUTION RESPIRATORY (INHALATION) at 06:05

## 2025-01-01 RX ADMIN — INSULIN ASPART 5 UNITS: 100 INJECTION, SOLUTION INTRAVENOUS; SUBCUTANEOUS at 01:05

## 2025-01-01 RX ADMIN — LIDOCAINE 3 PATCH: 700 PATCH TOPICAL at 12:05

## 2025-01-01 RX ADMIN — OXYCODONE 10 MG: 5 TABLET ORAL at 05:04

## 2025-01-01 RX ADMIN — MORPHINE SULFATE 2 MG: 4 INJECTION INTRAVENOUS at 02:05

## 2025-01-01 RX ADMIN — DEXMEDETOMIDINE HYDROCHLORIDE 0.8 MCG/KG/HR: 4 INJECTION INTRAVENOUS at 04:05

## 2025-01-01 RX ADMIN — METHOCARBAMOL 1000 MG: 500 TABLET ORAL at 12:05

## 2025-01-01 RX ADMIN — PANTOPRAZOLE SODIUM 40 MG: 40 INJECTION, POWDER, FOR SOLUTION INTRAVENOUS at 08:05

## 2025-01-01 RX ADMIN — MORPHINE SULFATE 2 MG: 4 INJECTION INTRAVENOUS at 04:04

## 2025-01-01 RX ADMIN — CEFEPIME 2 G: 2 INJECTION, POWDER, FOR SOLUTION INTRAVENOUS at 02:05

## 2025-01-01 RX ADMIN — OLANZAPINE 10 MG: 10 INJECTION, POWDER, FOR SOLUTION INTRAMUSCULAR at 01:05

## 2025-01-01 RX ADMIN — KETOROLAC TROMETHAMINE 15 MG: 30 INJECTION, SOLUTION INTRAMUSCULAR; INTRAVENOUS at 10:04

## 2025-01-01 RX ADMIN — DEXMEDETOMIDINE HYDROCHLORIDE 0.5 MCG/KG/HR: 4 INJECTION INTRAVENOUS at 01:05

## 2025-01-01 RX ADMIN — SUCRALFATE 1 G: 1 TABLET ORAL at 06:04

## 2025-01-01 RX ADMIN — ARFORMOTEROL TARTRATE 15 MCG: 15 SOLUTION RESPIRATORY (INHALATION) at 08:04

## 2025-01-01 RX ADMIN — DEXMEDETOMIDINE HYDROCHLORIDE 0.2 MCG/KG/HR: 4 INJECTION INTRAVENOUS at 07:04

## 2025-01-01 RX ADMIN — LIDOCAINE 1 PATCH: 50 PATCH TOPICAL at 06:04

## 2025-01-01 RX ADMIN — AMIODARONE HYDROCHLORIDE 1 MG/MIN: 1.8 INJECTION, SOLUTION INTRAVENOUS at 11:05

## 2025-01-01 RX ADMIN — Medication 25 MCG/HR: at 06:05

## 2025-01-01 RX ADMIN — MORPHINE SULFATE 4 MG: 4 INJECTION INTRAVENOUS at 01:05

## 2025-01-01 RX ADMIN — KETOROLAC TROMETHAMINE 15 MG: 30 INJECTION, SOLUTION INTRAMUSCULAR; INTRAVENOUS at 05:05

## 2025-01-01 RX ADMIN — METHYLPREDNISOLONE SODIUM SUCCINATE 125 MG: 125 INJECTION, POWDER, FOR SOLUTION INTRAMUSCULAR; INTRAVENOUS at 08:05

## 2025-01-01 RX ADMIN — LIDOCAINE 3 PATCH: 700 PATCH TOPICAL at 01:05

## 2025-01-01 RX ADMIN — OXYCODONE HYDROCHLORIDE 10 MG: 5 SOLUTION ORAL at 01:05

## 2025-01-01 RX ADMIN — SUCRALFATE 1 G: 1 TABLET ORAL at 09:04

## 2025-01-01 RX ADMIN — BUDESONIDE 1 MG: 0.5 INHALANT RESPIRATORY (INHALATION) at 08:05

## 2025-01-01 RX ADMIN — NOREPINEPHRINE BITARTRATE 0.6 MCG/KG/MIN: 4 INJECTION, SOLUTION INTRAVENOUS at 12:05

## 2025-01-01 RX ADMIN — ACETAMINOPHEN 650 MG: 650 SUPPOSITORY RECTAL at 09:05

## 2025-01-01 RX ADMIN — FENTANYL CITRATE 25 MCG: 50 INJECTION INTRAMUSCULAR; INTRAVENOUS at 03:05

## 2025-01-01 RX ADMIN — NOREPINEPHRINE BITARTRATE 0.05 MCG/KG/MIN: 4 INJECTION, SOLUTION INTRAVENOUS at 11:05

## 2025-01-01 RX ADMIN — MORPHINE SULFATE 2 MG: 4 INJECTION INTRAVENOUS at 04:05

## 2025-01-01 RX ADMIN — MORPHINE SULFATE 2 MG: 4 INJECTION INTRAVENOUS at 12:05

## 2025-01-01 RX ADMIN — MORPHINE SULFATE 4 MG: 4 INJECTION INTRAVENOUS at 04:05

## 2025-01-01 RX ADMIN — INSULIN ASPART 2 UNITS: 100 INJECTION, SOLUTION INTRAVENOUS; SUBCUTANEOUS at 06:04

## 2025-01-01 RX ADMIN — DEXMEDETOMIDINE HYDROCHLORIDE 0.6 MCG/KG/HR: 4 INJECTION INTRAVENOUS at 03:05

## 2025-01-01 RX ADMIN — LATANOPROST 1 DROP: 50 SOLUTION OPHTHALMIC at 09:04

## 2025-01-01 RX ADMIN — ACETAMINOPHEN 999.01 MG: 160 SOLUTION ORAL at 12:05

## 2025-01-01 RX ADMIN — LIDOCAINE 3 PATCH: 700 PATCH TOPICAL at 03:04

## 2025-01-01 RX ADMIN — INSULIN ASPART 6 UNITS: 100 INJECTION, SOLUTION INTRAVENOUS; SUBCUTANEOUS at 06:05

## 2025-01-01 RX ADMIN — MYCOPHENOLATE MOFETIL 1000 MG: 250 CAPSULE ORAL at 09:04

## 2025-01-01 RX ADMIN — POTASSIUM CHLORIDE 60 MEQ: 14.9 INJECTION, SOLUTION INTRAVENOUS at 10:05

## 2025-01-01 RX ADMIN — INSULIN ASPART 4 UNITS: 100 INJECTION, SOLUTION INTRAVENOUS; SUBCUTANEOUS at 12:05

## 2025-01-01 RX ADMIN — OXYBUTYNIN CHLORIDE 10 MG: 5 TABLET, EXTENDED RELEASE ORAL at 10:04

## 2025-01-01 RX ADMIN — DEXMEDETOMIDINE HYDROCHLORIDE 0.6 MCG/KG/HR: 4 INJECTION INTRAVENOUS at 02:05

## 2025-01-01 RX ADMIN — OXYCODONE 10 MG: 5 TABLET ORAL at 07:04

## 2025-01-01 RX ADMIN — LORAZEPAM 1 MG: 2 INJECTION INTRAMUSCULAR; INTRAVENOUS at 05:04

## 2025-01-01 RX ADMIN — INSULIN ASPART 6 UNITS: 100 INJECTION, SOLUTION INTRAVENOUS; SUBCUTANEOUS at 12:05

## 2025-01-01 RX ADMIN — FAMOTIDINE 20 MG: 20 TABLET, FILM COATED ORAL at 10:04

## 2025-01-01 RX ADMIN — ACETAMINOPHEN 650 MG: 650 SUPPOSITORY RECTAL at 05:05

## 2025-01-01 RX ADMIN — NOREPINEPHRINE BITARTRATE 0.04 MCG/KG/MIN: 4 INJECTION, SOLUTION INTRAVENOUS at 04:05

## 2025-01-01 RX ADMIN — KETOROLAC TROMETHAMINE 15 MG: 30 INJECTION, SOLUTION INTRAMUSCULAR; INTRAVENOUS at 02:04

## 2025-01-01 RX ADMIN — METHYLPREDNISOLONE SODIUM SUCCINATE 125 MG: 125 INJECTION, POWDER, FOR SOLUTION INTRAMUSCULAR; INTRAVENOUS at 09:05

## 2025-01-01 RX ADMIN — MUPIROCIN: 20 OINTMENT TOPICAL at 09:04

## 2025-01-01 RX ADMIN — SODIUM CHLORIDE 0.5 MG/HR: 9 INJECTION, SOLUTION INTRAVENOUS at 10:05

## 2025-01-01 RX ADMIN — SODIUM CHLORIDE 500 ML: 9 INJECTION, SOLUTION INTRAVENOUS at 10:05

## 2025-01-01 RX ADMIN — ARFORMOTEROL TARTRATE 15 MCG: 15 SOLUTION RESPIRATORY (INHALATION) at 08:05

## 2025-01-01 RX ADMIN — MORPHINE SULFATE 4 MG: 4 INJECTION INTRAVENOUS at 06:05

## 2025-01-01 RX ADMIN — CEFEPIME 2 G: 2 INJECTION, POWDER, FOR SOLUTION INTRAVENOUS at 01:04

## 2025-01-01 RX ADMIN — INSULIN ASPART 2 UNITS: 100 INJECTION, SOLUTION INTRAVENOUS; SUBCUTANEOUS at 05:04

## 2025-01-01 RX ADMIN — NOREPINEPHRINE BITARTRATE 0.14 MCG/KG/MIN: 4 INJECTION, SOLUTION INTRAVENOUS at 07:05

## 2025-01-01 RX ADMIN — CEFEPIME 2 G: 2 INJECTION, POWDER, FOR SOLUTION INTRAVENOUS at 12:05

## 2025-01-01 RX ADMIN — SODIUM CHLORIDE 1.75 MG/HR: 9 INJECTION, SOLUTION INTRAVENOUS at 01:05

## 2025-01-01 RX ADMIN — MORPHINE SULFATE 2 MG: 4 INJECTION INTRAVENOUS at 06:04

## 2025-01-01 RX ADMIN — DEXMEDETOMIDINE HYDROCHLORIDE 0.6 MCG/KG/HR: 4 INJECTION INTRAVENOUS at 10:05

## 2025-01-01 RX ADMIN — MORPHINE SULFATE 6 MG: 4 INJECTION INTRAVENOUS at 10:04

## 2025-01-01 RX ADMIN — MORPHINE SULFATE 4 MG: 4 INJECTION INTRAVENOUS at 08:04

## 2025-01-01 RX ADMIN — MUPIROCIN: 20 OINTMENT TOPICAL at 10:05

## 2025-01-01 RX ADMIN — LATANOPROST 1 DROP: 50 SOLUTION OPHTHALMIC at 10:05

## 2025-01-01 RX ADMIN — CEFEPIME 2 G: 2 INJECTION, POWDER, FOR SOLUTION INTRAVENOUS at 08:05

## 2025-01-01 RX ADMIN — OXYCODONE 10 MG: 5 TABLET ORAL at 11:04

## 2025-01-01 RX ADMIN — LORAZEPAM 1 MG: 2 INJECTION INTRAMUSCULAR; INTRAVENOUS at 03:04

## 2025-01-01 RX ADMIN — HYDROMORPHONE HYDROCHLORIDE 1 MG: 1 INJECTION, SOLUTION INTRAMUSCULAR; INTRAVENOUS; SUBCUTANEOUS at 12:04

## 2025-01-01 RX ADMIN — FENTANYL CITRATE 50 MCG: 50 INJECTION INTRAMUSCULAR; INTRAVENOUS at 06:05

## 2025-01-01 RX ADMIN — FENTANYL CITRATE 50 MCG: 50 INJECTION, SOLUTION INTRAMUSCULAR; INTRAVENOUS at 09:04

## 2025-01-01 RX ADMIN — CEFEPIME 2 G: 2 INJECTION, POWDER, FOR SOLUTION INTRAVENOUS at 05:04

## 2025-01-01 RX ADMIN — LORAZEPAM 0.5 MG: 0.5 TABLET ORAL at 10:04

## 2025-01-01 RX ADMIN — INSULIN ASPART 3 UNITS: 100 INJECTION, SOLUTION INTRAVENOUS; SUBCUTANEOUS at 12:05

## 2025-01-01 RX ADMIN — DEXMEDETOMIDINE HYDROCHLORIDE 0.2 MCG/KG/HR: 4 INJECTION INTRAVENOUS at 11:04

## 2025-01-01 RX ADMIN — KETOROLAC TROMETHAMINE 15 MG: 30 INJECTION, SOLUTION INTRAMUSCULAR; INTRAVENOUS at 06:05

## 2025-01-01 RX ADMIN — MORPHINE SULFATE 4 MG: 4 INJECTION INTRAVENOUS at 08:05

## 2025-01-01 RX ADMIN — MUPIROCIN: 20 OINTMENT TOPICAL at 01:04

## 2025-01-01 RX ADMIN — INSULIN ASPART 5 UNITS: 100 INJECTION, SOLUTION INTRAVENOUS; SUBCUTANEOUS at 05:05

## 2025-01-01 RX ADMIN — LATANOPROST 1 DROP: 50 SOLUTION OPHTHALMIC at 09:05

## 2025-01-01 RX ADMIN — DEXMEDETOMIDINE HYDROCHLORIDE 0.6 MCG/KG/HR: 4 INJECTION INTRAVENOUS at 04:05

## 2025-01-01 RX ADMIN — MUPIROCIN: 20 OINTMENT TOPICAL at 09:05

## 2025-01-01 RX ADMIN — LATANOPROST 1 DROP: 50 SOLUTION OPHTHALMIC at 08:05

## 2025-01-01 RX ADMIN — KETOROLAC TROMETHAMINE 15 MG: 30 INJECTION, SOLUTION INTRAMUSCULAR; INTRAVENOUS at 01:04

## 2025-01-01 RX ADMIN — LIDOCAINE 1 PATCH: 50 PATCH TOPICAL at 01:04

## 2025-01-01 RX ADMIN — INSULIN ASPART 4 UNITS: 100 INJECTION, SOLUTION INTRAVENOUS; SUBCUTANEOUS at 06:04

## 2025-01-01 RX ADMIN — MORPHINE SULFATE 4 MG: 4 INJECTION INTRAVENOUS at 12:04

## 2025-01-01 RX ADMIN — SODIUM CHLORIDE: 4.5 INJECTION, SOLUTION INTRAVENOUS at 10:05

## 2025-01-01 RX ADMIN — SODIUM CHLORIDE 75 ML: 4.5 INJECTION, SOLUTION INTRAVENOUS at 12:05

## 2025-01-01 RX ADMIN — NOREPINEPHRINE BITARTRATE 0.02 MCG/KG/MIN: 4 INJECTION, SOLUTION INTRAVENOUS at 09:05

## 2025-01-01 RX ADMIN — DEXMEDETOMIDINE HYDROCHLORIDE 0.5 MCG/KG/HR: 4 INJECTION INTRAVENOUS at 08:05

## 2025-01-01 RX ADMIN — FENTANYL CITRATE 100 MCG: 50 INJECTION, SOLUTION INTRAMUSCULAR; INTRAVENOUS at 04:05

## 2025-01-01 RX ADMIN — LORAZEPAM 1 MG: 2 INJECTION INTRAMUSCULAR; INTRAVENOUS at 10:04

## 2025-01-01 RX ADMIN — KETOROLAC TROMETHAMINE 15 MG: 30 INJECTION, SOLUTION INTRAMUSCULAR; INTRAVENOUS at 09:05

## 2025-01-01 RX ADMIN — MUPIROCIN: 20 OINTMENT TOPICAL at 10:04

## 2025-01-01 RX ADMIN — INSULIN ASPART 2 UNITS: 100 INJECTION, SOLUTION INTRAVENOUS; SUBCUTANEOUS at 02:04

## 2025-01-01 RX ADMIN — INSULIN ASPART 8 UNITS: 100 INJECTION, SOLUTION INTRAVENOUS; SUBCUTANEOUS at 06:05

## 2025-01-01 RX ADMIN — TRAZODONE HYDROCHLORIDE 50 MG: 50 TABLET ORAL at 12:04

## 2025-01-01 RX ADMIN — AMLODIPINE BESYLATE 5 MG: 5 TABLET ORAL at 10:04

## 2025-01-01 RX ADMIN — MORPHINE SULFATE 6 MG: 4 INJECTION INTRAVENOUS at 02:04

## 2025-01-01 RX ADMIN — IPRATROPIUM BROMIDE AND ALBUTEROL SULFATE 3 ML: 2.5; .5 SOLUTION RESPIRATORY (INHALATION) at 06:05

## 2025-01-01 RX ADMIN — DEXMEDETOMIDINE HYDROCHLORIDE 0.8 MCG/KG/HR: 4 INJECTION INTRAVENOUS at 11:05

## 2025-01-01 RX ADMIN — DEXMEDETOMIDINE HYDROCHLORIDE 1 MCG/KG/HR: 4 INJECTION INTRAVENOUS at 06:05

## 2025-01-01 RX ADMIN — HALOPERIDOL LACTATE 5 MG: 5 INJECTION, SOLUTION INTRAMUSCULAR at 03:05

## 2025-01-01 RX ADMIN — DEXMEDETOMIDINE HYDROCHLORIDE 0.9 MCG/KG/HR: 4 INJECTION INTRAVENOUS at 11:05

## 2025-01-01 RX ADMIN — INSULIN GLARGINE 15 UNITS: 100 INJECTION, SOLUTION SUBCUTANEOUS at 08:05

## 2025-01-01 RX ADMIN — FUROSEMIDE 40 MG: 10 INJECTION, SOLUTION INTRAVENOUS at 05:04

## 2025-01-01 RX ADMIN — MORPHINE SULFATE 2 MG: 4 INJECTION INTRAVENOUS at 01:04

## 2025-01-01 RX ADMIN — SODIUM CHLORIDE, POTASSIUM CHLORIDE, SODIUM LACTATE AND CALCIUM CHLORIDE 500 ML: 600; 310; 30; 20 INJECTION, SOLUTION INTRAVENOUS at 10:05

## 2025-01-01 RX ADMIN — INSULIN ASPART 4 UNITS: 100 INJECTION, SOLUTION INTRAVENOUS; SUBCUTANEOUS at 05:05

## 2025-01-01 RX ADMIN — INSULIN ASPART 1 UNITS: 100 INJECTION, SOLUTION INTRAVENOUS; SUBCUTANEOUS at 12:05

## 2025-01-01 RX ADMIN — ENOXAPARIN SODIUM 40 MG: 40 INJECTION SUBCUTANEOUS at 05:04

## 2025-01-01 RX ADMIN — MORPHINE SULFATE 4 MG: 4 INJECTION INTRAVENOUS at 02:04

## 2025-01-01 RX ADMIN — METHOCARBAMOL 1000 MG: 500 TABLET ORAL at 04:05

## 2025-01-01 RX ADMIN — LORAZEPAM 1 MG: 2 INJECTION INTRAMUSCULAR; INTRAVENOUS at 03:05

## 2025-01-01 RX ADMIN — NOREPINEPHRINE BITARTRATE 0.3 MCG/KG/MIN: 4 INJECTION, SOLUTION INTRAVENOUS at 02:05

## 2025-01-01 RX ADMIN — DEXMEDETOMIDINE HYDROCHLORIDE 1.2 MCG/KG/HR: 4 INJECTION INTRAVENOUS at 04:05

## 2025-01-01 RX ADMIN — KETOROLAC TROMETHAMINE 15 MG: 30 INJECTION, SOLUTION INTRAMUSCULAR; INTRAVENOUS at 09:04

## 2025-01-01 RX ADMIN — NOREPINEPHRINE BITARTRATE 0.25 MCG/KG/MIN: 4 INJECTION, SOLUTION INTRAVENOUS at 07:05

## 2025-01-01 RX ADMIN — LORAZEPAM 1 MG: 2 INJECTION INTRAMUSCULAR; INTRAVENOUS at 09:04

## 2025-01-01 RX ADMIN — MORPHINE SULFATE 4 MG: 4 INJECTION INTRAVENOUS at 02:05

## 2025-01-01 RX ADMIN — SODIUM CHLORIDE, POTASSIUM CHLORIDE, SODIUM LACTATE AND CALCIUM CHLORIDE 500 ML: 600; 310; 30; 20 INJECTION, SOLUTION INTRAVENOUS at 08:05

## 2025-01-01 RX ADMIN — BUDESONIDE 1 MG: 0.5 INHALANT RESPIRATORY (INHALATION) at 06:05

## 2025-01-01 RX ADMIN — LIDOCAINE 3 PATCH: 700 PATCH TOPICAL at 02:04

## 2025-01-01 RX ADMIN — INSULIN GLARGINE 5 UNITS: 100 INJECTION, SOLUTION SUBCUTANEOUS at 09:04

## 2025-01-01 RX ADMIN — PROMETHAZINE HYDROCHLORIDE AND CODEINE PHOSPHATE 5 ML: 6.25; 1 SOLUTION ORAL at 10:04

## 2025-01-01 RX ADMIN — MORPHINE SULFATE 2 MG: 4 INJECTION INTRAVENOUS at 09:05

## 2025-01-01 RX ADMIN — ASPIRIN 81 MG: 81 TABLET, CHEWABLE ORAL at 10:04

## 2025-01-01 RX ADMIN — CEFEPIME 2 G: 2 INJECTION, POWDER, FOR SOLUTION INTRAVENOUS at 09:05

## 2025-01-01 RX ADMIN — KETOROLAC TROMETHAMINE 15 MG: 30 INJECTION, SOLUTION INTRAMUSCULAR; INTRAVENOUS at 11:05

## 2025-01-01 RX ADMIN — MORPHINE SULFATE 4 MG: 4 INJECTION INTRAVENOUS at 12:05

## 2025-01-01 RX ADMIN — LIDOCAINE 1 PATCH: 50 PATCH TOPICAL at 04:04

## 2025-01-01 RX ADMIN — DEXMEDETOMIDINE HYDROCHLORIDE 1 MCG/KG/HR: 4 INJECTION INTRAVENOUS at 11:05

## 2025-01-01 RX ADMIN — LORAZEPAM 1 MG: 2 INJECTION INTRAMUSCULAR; INTRAVENOUS at 08:05

## 2025-01-01 RX ADMIN — KETOROLAC TROMETHAMINE 15 MG: 30 INJECTION, SOLUTION INTRAMUSCULAR; INTRAVENOUS at 08:05

## 2025-01-01 RX ADMIN — DIPHENHYDRAMINE HYDROCHLORIDE 25 MG: 25 CAPSULE ORAL at 06:04

## 2025-01-01 RX ADMIN — FENTANYL CITRATE 50 MCG: 50 INJECTION INTRAMUSCULAR; INTRAVENOUS at 05:05

## 2025-01-01 RX ADMIN — NOREPINEPHRINE BITARTRATE 0.2 MCG/KG/MIN: 4 INJECTION, SOLUTION INTRAVENOUS at 12:05

## 2025-01-01 RX ADMIN — SODIUM CHLORIDE 500 ML: 9 INJECTION, SOLUTION INTRAVENOUS at 08:05

## 2025-01-01 RX ADMIN — INSULIN ASPART 4 UNITS: 100 INJECTION, SOLUTION INTRAVENOUS; SUBCUTANEOUS at 06:05

## 2025-01-01 RX ADMIN — VANCOMYCIN HYDROCHLORIDE 1250 MG: 1.25 INJECTION, POWDER, LYOPHILIZED, FOR SOLUTION INTRAVENOUS at 09:05

## 2025-01-01 RX ADMIN — SACUBITRIL AND VALSARTAN 1 TABLET: 24; 26 TABLET, FILM COATED ORAL at 09:04

## 2025-01-01 RX ADMIN — SACUBITRIL AND VALSARTAN 1 TABLET: 24; 26 TABLET, FILM COATED ORAL at 10:04

## 2025-01-01 RX ADMIN — INSULIN GLARGINE 15 UNITS: 100 INJECTION, SOLUTION SUBCUTANEOUS at 09:05

## 2025-01-01 RX ADMIN — INSULIN ASPART 4 UNITS: 100 INJECTION, SOLUTION INTRAVENOUS; SUBCUTANEOUS at 11:05

## 2025-01-01 RX ADMIN — AMIODARONE HYDROCHLORIDE 150 MG: 1.5 INJECTION, SOLUTION INTRAVENOUS at 11:05

## 2025-01-01 RX ADMIN — NOREPINEPHRINE BITARTRATE 1 MCG/KG/MIN: 4 INJECTION, SOLUTION INTRAVENOUS at 11:05

## 2025-01-01 RX ADMIN — SODIUM CHLORIDE: 4.5 INJECTION, SOLUTION INTRAVENOUS at 12:05

## 2025-01-01 RX ADMIN — QUETIAPINE FUMARATE 25 MG: 25 TABLET ORAL at 12:05

## 2025-01-01 RX ADMIN — MORPHINE SULFATE 4 MG: 4 INJECTION INTRAVENOUS at 05:05

## 2025-01-01 RX ADMIN — ACETAMINOPHEN 999.01 MG: 160 SOLUTION ORAL at 05:05

## 2025-01-01 RX ADMIN — MUPIROCIN: 20 OINTMENT TOPICAL at 11:04

## 2025-01-01 RX ADMIN — KETOROLAC TROMETHAMINE 15 MG: 30 INJECTION, SOLUTION INTRAMUSCULAR; INTRAVENOUS at 06:04

## 2025-01-01 RX ADMIN — LORAZEPAM 1 MG: 0.5 TABLET ORAL at 04:04

## 2025-01-01 RX ADMIN — MYCOPHENOLATE MOFETIL 1000 MG: 250 CAPSULE ORAL at 10:04

## 2025-01-01 RX ADMIN — SODIUM CHLORIDE 1000 MG: 9 INJECTION, SOLUTION INTRAVENOUS at 10:05

## 2025-01-01 RX ADMIN — OLANZAPINE 10 MG: 10 INJECTION, POWDER, FOR SOLUTION INTRAMUSCULAR at 09:05

## 2025-01-01 RX ADMIN — INSULIN GLARGINE 15 UNITS: 100 INJECTION, SOLUTION SUBCUTANEOUS at 10:05

## 2025-01-01 RX ADMIN — INSULIN ASPART 2 UNITS: 100 INJECTION, SOLUTION INTRAVENOUS; SUBCUTANEOUS at 06:05

## 2025-01-02 ENCOUNTER — LAB VISIT (OUTPATIENT)
Dept: LAB | Facility: HOSPITAL | Age: 75
End: 2025-01-02
Attending: NURSE PRACTITIONER
Payer: MEDICARE

## 2025-01-02 DIAGNOSIS — E04.9 THYROID ENLARGEMENT: ICD-10-CM

## 2025-01-02 DIAGNOSIS — E11.9 TYPE 2 DIABETES MELLITUS WITHOUT COMPLICATION, WITHOUT LONG-TERM CURRENT USE OF INSULIN: ICD-10-CM

## 2025-01-02 LAB
T4 FREE SERPL-MCNC: 1.48 NG/DL (ref 0.71–1.51)
THYROPEROXIDASE IGG SERPL-ACNC: <6 IU/ML
TSH SERPL DL<=0.005 MIU/L-ACNC: 0.14 UIU/ML (ref 0.4–4)

## 2025-01-02 PROCEDURE — 83036 HEMOGLOBIN GLYCOSYLATED A1C: CPT | Mod: HCNC | Performed by: INTERNAL MEDICINE

## 2025-01-02 PROCEDURE — 84439 ASSAY OF FREE THYROXINE: CPT | Mod: HCNC | Performed by: NURSE PRACTITIONER

## 2025-01-02 PROCEDURE — 36415 COLL VENOUS BLD VENIPUNCTURE: CPT | Mod: HCNC,PN | Performed by: INTERNAL MEDICINE

## 2025-01-02 PROCEDURE — 86376 MICROSOMAL ANTIBODY EACH: CPT | Mod: HCNC | Performed by: NURSE PRACTITIONER

## 2025-01-02 PROCEDURE — 84443 ASSAY THYROID STIM HORMONE: CPT | Mod: HCNC | Performed by: NURSE PRACTITIONER

## 2025-01-03 ENCOUNTER — CLINICAL SUPPORT (OUTPATIENT)
Dept: OPHTHALMOLOGY | Facility: CLINIC | Age: 75
End: 2025-01-03
Payer: MEDICARE

## 2025-01-03 LAB
ESTIMATED AVG GLUCOSE: 177 MG/DL (ref 68–131)
HBA1C MFR BLD: 7.8 % (ref 4–5.6)

## 2025-01-03 NOTE — PROGRESS NOTES
OCT/HVF done ou./ rel/fix/ fair ou coop. Poor./ eye and body movement, had to pause test to readjust patient./ chart checked for latex allergy./ plano+ 1.50 x 100/od plano/os-smh

## 2025-01-06 ENCOUNTER — TELEPHONE (OUTPATIENT)
Dept: FAMILY MEDICINE | Facility: CLINIC | Age: 75
End: 2025-01-06
Payer: MEDICARE

## 2025-01-07 ENCOUNTER — TELEPHONE (OUTPATIENT)
Dept: FAMILY MEDICINE | Facility: CLINIC | Age: 75
End: 2025-01-07
Payer: MEDICARE

## 2025-01-07 DIAGNOSIS — E05.90 SUBCLINICAL HYPERTHYROIDISM: Primary | ICD-10-CM

## 2025-01-07 NOTE — TELEPHONE ENCOUNTER
Patient called and notified of ultrasound and lab results.  He states he has needed to increase his oxygen on around the cold weather but has been stable on 4 L nasal cannula at home.  He states his blood sugars are improving with 145 blood glucose reading today.  Recent A1c 7.8.  Will consult endocrinology given subclinical hyperthyroidism and ultrasound results.  All questions answered patient verbalized understanding in agreement with plan of care.  Will continue insulin for now

## 2025-01-10 ENCOUNTER — OFFICE VISIT (OUTPATIENT)
Dept: OPHTHALMOLOGY | Facility: CLINIC | Age: 75
End: 2025-01-10
Payer: MEDICARE

## 2025-01-10 DIAGNOSIS — H40.1123 PRIMARY OPEN ANGLE GLAUCOMA (POAG) OF LEFT EYE, SEVERE STAGE: ICD-10-CM

## 2025-01-10 DIAGNOSIS — H40.1111 PRIMARY OPEN ANGLE GLAUCOMA (POAG) OF RIGHT EYE, MILD STAGE: Primary | ICD-10-CM

## 2025-01-10 DIAGNOSIS — H25.811 COMBINED FORM OF AGE-RELATED CATARACT, RIGHT EYE: ICD-10-CM

## 2025-01-10 DIAGNOSIS — H35.033 HYPERTENSIVE RETINOPATHY OF BOTH EYES: ICD-10-CM

## 2025-01-10 DIAGNOSIS — H25.812 COMBINED FORM OF AGE-RELATED CATARACT, LEFT EYE: ICD-10-CM

## 2025-01-10 PROCEDURE — 99999 PR PBB SHADOW E&M-EST. PATIENT-LVL III: CPT | Mod: PBBFAC,HCNC,, | Performed by: STUDENT IN AN ORGANIZED HEALTH CARE EDUCATION/TRAINING PROGRAM

## 2025-01-10 NOTE — PROGRESS NOTES
Subjective:  HPI    Chief complaint: Glaucoma evaluation  Pt states he was being followed by Dr. Tamayo for glaucoma and Dr. Babin   for injections in the left eye. Last injection was about 7 months ago.       Past medical history? +CAD/+HTN/+Thyroid/+Type 2 diabetes  Past ocular history? NSC OU     Glaucoma history:  Diagnosed with glaucoma when? 1 year/poor historian  Hx eye surgery? No  Hx eye lasers? No  History of low blood pressure? No  History of migraines? No  History of blunt trauma to eye? No  History of steroid use? No  What is the highest your eye pressure has been? unkown    Eye drops? Latanoprost Q HS OU    Last edited by Samira Palma MD on 1/10/2025 11:30 AM.        Exam:  See encounter report for full exam    Assessment:  1. Primary open angle glaucoma (POAG) of right eye, mild stage  2. Primary open angle glaucoma (POAG) of left eye, severe stage  RAPD OS  - PMH: T2DM, non-obstructive CAD, HTN, chronic hypoxic respiratory failure 2/2 ILD (on 2LNC at home; Cellcept for therapy), hx of CVA  - Surg hx: none   - Laser hx: none  - Glaucoma FHx: denies  - CCT   /    - Gonio:  (Louie 1/2025)  - Tmax: unknown  - Target IOP: teens  - Med adverse effects: n/a    Baseline photos pending    01/10/2025  IOP 12/13 on Latanoprost qhs OU  HVF: poorly reliable, full, VFI 97 OD  reliable, extinguished, VFI 20 OS -- baseline  OCT RNFL: sup<inf thinning, avg 73 OD  diffuse thinning, avg 43 OS -- baseline  RAPD OS    3. Combined form of age-related cataract, right eye  4. Combined form of age-related cataract, left eye  Mild, NVS, monitor    5. Hypertensive retinopathy of both eyes  ROSELIA superior arcade OS  Hx injections for ?stroke -- was unable to get OCT macula today; refer to retina for eval    Plan:  IOP seems adequate for now. OS is completely cupped, likely reason for poor vision. However, states had injections for a ?stroke with retina doctors. Unable to get OCT mac-- will send for eval.  -  Continue Latanoprost qhs OU    Today's visit is associated with current and anticipated ongoing medical care related to this patient's single serious/complex condition (glaucoma). Follow up is to be continued indefinitely to monitor the condition.    Return 4 months -- OCT, VA, IOP, CCT, Dilate    Samira Palma MD  [facility]

## 2025-01-14 DIAGNOSIS — Z00.00 ENCOUNTER FOR MEDICARE ANNUAL WELLNESS EXAM: ICD-10-CM

## 2025-01-15 ENCOUNTER — TELEPHONE (OUTPATIENT)
Dept: FAMILY MEDICINE | Facility: CLINIC | Age: 75
End: 2025-01-15
Payer: MEDICARE

## 2025-01-15 NOTE — TELEPHONE ENCOUNTER
----- Message from Corwin Yañez NP sent at 1/15/2025  3:00 PM CST -----  Please call patient and see how his sugars are doing on his insulin currently.  Thank you

## 2025-01-16 ENCOUNTER — HOSPITAL ENCOUNTER (INPATIENT)
Facility: HOSPITAL | Age: 75
LOS: 4 days | Discharge: HOME | DRG: 196 | End: 2025-01-20
Attending: EMERGENCY MEDICINE | Admitting: STUDENT IN AN ORGANIZED HEALTH CARE EDUCATION/TRAINING PROGRAM
Payer: MEDICARE

## 2025-01-16 DIAGNOSIS — J96.11 CHRONIC HYPOXEMIC RESPIRATORY FAILURE: ICD-10-CM

## 2025-01-16 DIAGNOSIS — R06.02 SOB (SHORTNESS OF BREATH): ICD-10-CM

## 2025-01-16 DIAGNOSIS — R00.0 TACHYCARDIA: ICD-10-CM

## 2025-01-16 DIAGNOSIS — R06.02 SHORTNESS OF BREATH: ICD-10-CM

## 2025-01-16 DIAGNOSIS — R07.9 CHEST PAIN: ICD-10-CM

## 2025-01-16 DIAGNOSIS — R06.82 TACHYPNEA: ICD-10-CM

## 2025-01-16 DIAGNOSIS — I49.8 BIGEMINY: ICD-10-CM

## 2025-01-16 DIAGNOSIS — Z13.6 SCREENING FOR CARDIOVASCULAR CONDITION: ICD-10-CM

## 2025-01-16 DIAGNOSIS — J84.9 INTERSTITIAL LUNG DISEASE: Primary | ICD-10-CM

## 2025-01-16 DIAGNOSIS — J96.01 ACUTE HYPOXIC RESPIRATORY FAILURE: ICD-10-CM

## 2025-01-16 DIAGNOSIS — J84.9 INTERSTITIAL LUNG DISEASE: ICD-10-CM

## 2025-01-16 LAB
ALBUMIN SERPL BCP-MCNC: 3.8 G/DL (ref 3.5–5.2)
ALLENS TEST: ABNORMAL
ALP SERPL-CCNC: 79 U/L (ref 40–150)
ALT SERPL W/O P-5'-P-CCNC: 14 U/L (ref 10–44)
ANION GAP SERPL CALC-SCNC: 13 MMOL/L (ref 8–16)
AST SERPL-CCNC: 21 U/L (ref 10–40)
BACTERIA #/AREA URNS HPF: NORMAL /HPF
BASOPHILS # BLD AUTO: 0.02 K/UL (ref 0–0.2)
BASOPHILS NFR BLD: 0.2 % (ref 0–1.9)
BILIRUB SERPL-MCNC: 0.7 MG/DL (ref 0.1–1)
BILIRUB UR QL STRIP: NEGATIVE
BNP SERPL-MCNC: 56 PG/ML (ref 0–99)
BUN SERPL-MCNC: 12 MG/DL (ref 8–23)
CALCIUM SERPL-MCNC: 10.6 MG/DL (ref 8.7–10.5)
CHLORIDE SERPL-SCNC: 97 MMOL/L (ref 95–110)
CLARITY UR: CLEAR
CO2 SERPL-SCNC: 24 MMOL/L (ref 23–29)
COLOR UR: YELLOW
CREAT SERPL-MCNC: 1.1 MG/DL (ref 0.5–1.4)
CTP QC/QA: YES
CTP QC/QA: YES
D DIMER PPP IA.FEU-MCNC: 0.5 MG/L FEU
DELSYS: ABNORMAL
DIFFERENTIAL METHOD BLD: ABNORMAL
EOSINOPHIL # BLD AUTO: 0.3 K/UL (ref 0–0.5)
EOSINOPHIL NFR BLD: 2.8 % (ref 0–8)
EP: 8
ERYTHROCYTE [DISTWIDTH] IN BLOOD BY AUTOMATED COUNT: 12.9 % (ref 11.5–14.5)
ERYTHROCYTE [SEDIMENTATION RATE] IN BLOOD BY WESTERGREN METHOD: 75 MM/H
EST. GFR  (NO RACE VARIABLE): >60 ML/MIN/1.73 M^2
FERRITIN SERPL-MCNC: 230 NG/ML (ref 20–300)
FIO2: 30
GLUCOSE SERPL-MCNC: 278 MG/DL (ref 70–110)
GLUCOSE UR QL STRIP: ABNORMAL
HCO3 UR-SCNC: 22.7 MMOL/L (ref 24–28)
HCT VFR BLD AUTO: 43.3 % (ref 40–54)
HGB BLD-MCNC: 14.4 G/DL (ref 14–18)
HGB UR QL STRIP: NEGATIVE
HYALINE CASTS #/AREA URNS LPF: 0 /LPF
IMM GRANULOCYTES # BLD AUTO: 0.03 K/UL (ref 0–0.04)
IMM GRANULOCYTES NFR BLD AUTO: 0.3 % (ref 0–0.5)
IP: 15
IRON SERPL-MCNC: 32 UG/DL (ref 45–160)
KETONES UR QL STRIP: ABNORMAL
LACTATE SERPL-SCNC: 1.6 MMOL/L (ref 0.5–2.2)
LACTATE SERPL-SCNC: 3.2 MMOL/L (ref 0.5–2.2)
LEUKOCYTE ESTERASE UR QL STRIP: NEGATIVE
LYMPHOCYTES # BLD AUTO: 1 K/UL (ref 1–4.8)
LYMPHOCYTES NFR BLD: 10.2 % (ref 18–48)
MCH RBC QN AUTO: 27 PG (ref 27–31)
MCHC RBC AUTO-ENTMCNC: 33.3 G/DL (ref 32–36)
MCV RBC AUTO: 81 FL (ref 82–98)
MICROSCOPIC COMMENT: NORMAL
MODE: ABNORMAL
MONOCYTES # BLD AUTO: 0.7 K/UL (ref 0.3–1)
MONOCYTES NFR BLD: 7.2 % (ref 4–15)
NEUTROPHILS # BLD AUTO: 7.8 K/UL (ref 1.8–7.7)
NEUTROPHILS NFR BLD: 79.3 % (ref 38–73)
NITRITE UR QL STRIP: NEGATIVE
NRBC BLD-RTO: 0 /100 WBC
PCO2 BLDA: 37.9 MMHG (ref 35–45)
PH SMN: 7.38 [PH] (ref 7.35–7.45)
PH UR STRIP: 6 [PH] (ref 5–8)
PLATELET # BLD AUTO: 382 K/UL (ref 150–450)
PMV BLD AUTO: 9.5 FL (ref 9.2–12.9)
PO2 BLDA: 101 MMHG (ref 80–100)
POC BE: -2 MMOL/L
POC MOLECULAR INFLUENZA A AGN: NEGATIVE
POC MOLECULAR INFLUENZA B AGN: NEGATIVE
POC SATURATED O2: 98 % (ref 95–100)
POC TCO2: 24 MMOL/L (ref 23–27)
POCT GLUCOSE: 266 MG/DL (ref 70–110)
POCT GLUCOSE: 291 MG/DL (ref 70–110)
POCT GLUCOSE: 299 MG/DL (ref 70–110)
POTASSIUM SERPL-SCNC: 3.4 MMOL/L (ref 3.5–5.1)
PROCALCITONIN SERPL IA-MCNC: 0.07 NG/ML
PROT SERPL-MCNC: 8.3 G/DL (ref 6–8.4)
PROT UR QL STRIP: ABNORMAL
RBC # BLD AUTO: 5.33 M/UL (ref 4.6–6.2)
RBC #/AREA URNS HPF: 0 /HPF (ref 0–4)
RSV AG SPEC QL IA: NEGATIVE
SAMPLE: ABNORMAL
SARS-COV-2 RDRP RESP QL NAA+PROBE: NEGATIVE
SATURATED IRON: 12 % (ref 20–50)
SITE: ABNORMAL
SODIUM SERPL-SCNC: 134 MMOL/L (ref 136–145)
SP GR UR STRIP: >1.03 (ref 1–1.03)
SP02: 93
SPECIMEN SOURCE: NORMAL
SPONT RATE: 59
TOTAL IRON BINDING CAPACITY: 278 UG/DL (ref 250–450)
TRANSFERRIN SERPL-MCNC: 188 MG/DL (ref 200–375)
TROPONIN I SERPL DL<=0.01 NG/ML-MCNC: 0.02 NG/ML (ref 0–0.03)
URN SPEC COLLECT METH UR: ABNORMAL
UROBILINOGEN UR STRIP-ACNC: NEGATIVE EU/DL
WBC # BLD AUTO: 9.87 K/UL (ref 3.9–12.7)
WBC #/AREA URNS HPF: 0 /HPF (ref 0–5)
YEAST URNS QL MICRO: NORMAL

## 2025-01-16 PROCEDURE — 94799 UNLISTED PULMONARY SVC/PX: CPT | Mod: HCNC

## 2025-01-16 PROCEDURE — 84484 ASSAY OF TROPONIN QUANT: CPT | Mod: HCNC | Performed by: EMERGENCY MEDICINE

## 2025-01-16 PROCEDURE — 81000 URINALYSIS NONAUTO W/SCOPE: CPT | Mod: HCNC | Performed by: EMERGENCY MEDICINE

## 2025-01-16 PROCEDURE — 84145 PROCALCITONIN (PCT): CPT | Mod: HCNC | Performed by: EMERGENCY MEDICINE

## 2025-01-16 PROCEDURE — 85379 FIBRIN DEGRADATION QUANT: CPT | Mod: HCNC | Performed by: EMERGENCY MEDICINE

## 2025-01-16 PROCEDURE — 87804 INFLUENZA ASSAY W/OPTIC: CPT | Mod: HCNC

## 2025-01-16 PROCEDURE — 94640 AIRWAY INHALATION TREATMENT: CPT | Mod: HCNC

## 2025-01-16 PROCEDURE — 25000003 PHARM REV CODE 250: Mod: HCNC | Performed by: EMERGENCY MEDICINE

## 2025-01-16 PROCEDURE — 25500020 PHARM REV CODE 255: Mod: HCNC | Performed by: EMERGENCY MEDICINE

## 2025-01-16 PROCEDURE — 63600175 PHARM REV CODE 636 W HCPCS: Mod: HCNC | Performed by: STUDENT IN AN ORGANIZED HEALTH CARE EDUCATION/TRAINING PROGRAM

## 2025-01-16 PROCEDURE — 99285 EMERGENCY DEPT VISIT HI MDM: CPT | Mod: 25,HCNC

## 2025-01-16 PROCEDURE — 93005 ELECTROCARDIOGRAM TRACING: CPT | Mod: HCNC

## 2025-01-16 PROCEDURE — 87635 SARS-COV-2 COVID-19 AMP PRB: CPT | Mod: HCNC | Performed by: EMERGENCY MEDICINE

## 2025-01-16 PROCEDURE — 84466 ASSAY OF TRANSFERRIN: CPT | Mod: HCNC | Performed by: EMERGENCY MEDICINE

## 2025-01-16 PROCEDURE — 96374 THER/PROPH/DIAG INJ IV PUSH: CPT | Mod: 59,HCNC

## 2025-01-16 PROCEDURE — 27000190 HC CPAP FULL FACE MASK W/VALVE: Mod: HCNC

## 2025-01-16 PROCEDURE — 96365 THER/PROPH/DIAG IV INF INIT: CPT | Mod: HCNC

## 2025-01-16 PROCEDURE — 87634 RSV DNA/RNA AMP PROBE: CPT | Mod: HCNC | Performed by: EMERGENCY MEDICINE

## 2025-01-16 PROCEDURE — 87581 M.PNEUMON DNA AMP PROBE: CPT | Mod: HCNC | Performed by: STUDENT IN AN ORGANIZED HEALTH CARE EDUCATION/TRAINING PROGRAM

## 2025-01-16 PROCEDURE — 82728 ASSAY OF FERRITIN: CPT | Mod: HCNC | Performed by: EMERGENCY MEDICINE

## 2025-01-16 PROCEDURE — 63600175 PHARM REV CODE 636 W HCPCS: Mod: HCNC | Performed by: EMERGENCY MEDICINE

## 2025-01-16 PROCEDURE — 99900035 HC TECH TIME PER 15 MIN (STAT): Mod: HCNC

## 2025-01-16 PROCEDURE — 96376 TX/PRO/DX INJ SAME DRUG ADON: CPT | Mod: HCNC

## 2025-01-16 PROCEDURE — 83880 ASSAY OF NATRIURETIC PEPTIDE: CPT | Mod: HCNC | Performed by: EMERGENCY MEDICINE

## 2025-01-16 PROCEDURE — 25000242 PHARM REV CODE 250 ALT 637 W/ HCPCS: Mod: HCNC | Performed by: EMERGENCY MEDICINE

## 2025-01-16 PROCEDURE — 5A09357 ASSISTANCE WITH RESPIRATORY VENTILATION, LESS THAN 24 CONSECUTIVE HOURS, CONTINUOUS POSITIVE AIRWAY PRESSURE: ICD-10-PCS | Performed by: STUDENT IN AN ORGANIZED HEALTH CARE EDUCATION/TRAINING PROGRAM

## 2025-01-16 PROCEDURE — 94761 N-INVAS EAR/PLS OXIMETRY MLT: CPT | Mod: HCNC

## 2025-01-16 PROCEDURE — 99291 CRITICAL CARE FIRST HOUR: CPT | Mod: ,,, | Performed by: INTERNAL MEDICINE

## 2025-01-16 PROCEDURE — 96375 TX/PRO/DX INJ NEW DRUG ADDON: CPT | Mod: HCNC

## 2025-01-16 PROCEDURE — 27000221 HC OXYGEN, UP TO 24 HOURS: Mod: HCNC

## 2025-01-16 PROCEDURE — 83605 ASSAY OF LACTIC ACID: CPT | Mod: HCNC | Performed by: EMERGENCY MEDICINE

## 2025-01-16 PROCEDURE — 85025 COMPLETE CBC W/AUTO DIFF WBC: CPT | Mod: HCNC | Performed by: EMERGENCY MEDICINE

## 2025-01-16 PROCEDURE — 96366 THER/PROPH/DIAG IV INF ADDON: CPT | Mod: HCNC

## 2025-01-16 PROCEDURE — 25000003 PHARM REV CODE 250: Mod: HCNC | Performed by: STUDENT IN AN ORGANIZED HEALTH CARE EDUCATION/TRAINING PROGRAM

## 2025-01-16 PROCEDURE — 80053 COMPREHEN METABOLIC PANEL: CPT | Mod: HCNC | Performed by: EMERGENCY MEDICINE

## 2025-01-16 PROCEDURE — 82962 GLUCOSE BLOOD TEST: CPT | Mod: HCNC

## 2025-01-16 PROCEDURE — 93010 ELECTROCARDIOGRAM REPORT: CPT | Mod: HCNC,,, | Performed by: INTERNAL MEDICINE

## 2025-01-16 PROCEDURE — 20000000 HC ICU ROOM: Mod: HCNC

## 2025-01-16 PROCEDURE — 87040 BLOOD CULTURE FOR BACTERIA: CPT | Mod: 59,HCNC | Performed by: EMERGENCY MEDICINE

## 2025-01-16 RX ORDER — PANTOPRAZOLE SODIUM 40 MG/1
40 TABLET, DELAYED RELEASE ORAL DAILY
Status: DISCONTINUED | OUTPATIENT
Start: 2025-01-16 | End: 2025-01-16

## 2025-01-16 RX ORDER — INSULIN ASPART 100 [IU]/ML
0-5 INJECTION, SOLUTION INTRAVENOUS; SUBCUTANEOUS
Status: DISCONTINUED | OUTPATIENT
Start: 2025-01-16 | End: 2025-01-17 | Stop reason: SDUPTHER

## 2025-01-16 RX ORDER — POLYETHYLENE GLYCOL 3350 17 G/17G
17 POWDER, FOR SOLUTION ORAL 2 TIMES DAILY PRN
Status: DISCONTINUED | OUTPATIENT
Start: 2025-01-16 | End: 2025-01-20 | Stop reason: HOSPADM

## 2025-01-16 RX ORDER — METHYLPREDNISOLONE SOD SUCC 125 MG
40 VIAL (EA) INJECTION 2 TIMES DAILY
Status: DISCONTINUED | OUTPATIENT
Start: 2025-01-16 | End: 2025-01-16

## 2025-01-16 RX ORDER — ACETAMINOPHEN 325 MG/1
650 TABLET ORAL EVERY 4 HOURS PRN
Status: DISCONTINUED | OUTPATIENT
Start: 2025-01-16 | End: 2025-01-20 | Stop reason: HOSPADM

## 2025-01-16 RX ORDER — MYCOPHENOLATE MOFETIL 250 MG/1
1000 CAPSULE ORAL 2 TIMES DAILY
Status: DISCONTINUED | OUTPATIENT
Start: 2025-01-16 | End: 2025-01-16

## 2025-01-16 RX ORDER — LANOLIN ALCOHOL/MO/W.PET/CERES
800 CREAM (GRAM) TOPICAL
Status: DISCONTINUED | OUTPATIENT
Start: 2025-01-16 | End: 2025-01-20 | Stop reason: HOSPADM

## 2025-01-16 RX ORDER — LORAZEPAM 2 MG/ML
1 INJECTION INTRAMUSCULAR
Status: COMPLETED | OUTPATIENT
Start: 2025-01-16 | End: 2025-01-16

## 2025-01-16 RX ORDER — METHYLPREDNISOLONE SOD SUCC 125 MG
60 VIAL (EA) INJECTION EVERY 6 HOURS
Status: DISCONTINUED | OUTPATIENT
Start: 2025-01-17 | End: 2025-01-18

## 2025-01-16 RX ORDER — TALC
6 POWDER (GRAM) TOPICAL NIGHTLY
Status: DISCONTINUED | OUTPATIENT
Start: 2025-01-16 | End: 2025-01-20 | Stop reason: HOSPADM

## 2025-01-16 RX ORDER — IPRATROPIUM BROMIDE AND ALBUTEROL SULFATE 2.5; .5 MG/3ML; MG/3ML
3 SOLUTION RESPIRATORY (INHALATION)
Status: COMPLETED | OUTPATIENT
Start: 2025-01-16 | End: 2025-01-16

## 2025-01-16 RX ORDER — METHYLPREDNISOLONE SOD SUCC 125 MG
125 VIAL (EA) INJECTION
Status: COMPLETED | OUTPATIENT
Start: 2025-01-16 | End: 2025-01-16

## 2025-01-16 RX ORDER — SERTRALINE HYDROCHLORIDE 25 MG/1
25 TABLET, FILM COATED ORAL DAILY
Status: DISCONTINUED | OUTPATIENT
Start: 2025-01-17 | End: 2025-01-20 | Stop reason: HOSPADM

## 2025-01-16 RX ORDER — SODIUM,POTASSIUM PHOSPHATES 280-250MG
2 POWDER IN PACKET (EA) ORAL
Status: DISCONTINUED | OUTPATIENT
Start: 2025-01-16 | End: 2025-01-20 | Stop reason: HOSPADM

## 2025-01-16 RX ORDER — LATANOPROST 50 UG/ML
1 SOLUTION/ DROPS OPHTHALMIC NIGHTLY
Status: DISCONTINUED | OUTPATIENT
Start: 2025-01-16 | End: 2025-01-20 | Stop reason: HOSPADM

## 2025-01-16 RX ORDER — FAMOTIDINE 10 MG/ML
20 INJECTION INTRAVENOUS 2 TIMES DAILY
Status: DISCONTINUED | OUTPATIENT
Start: 2025-01-16 | End: 2025-01-19

## 2025-01-16 RX ORDER — MAGNESIUM SULFATE HEPTAHYDRATE 40 MG/ML
2 INJECTION, SOLUTION INTRAVENOUS ONCE
Status: COMPLETED | OUTPATIENT
Start: 2025-01-16 | End: 2025-01-16

## 2025-01-16 RX ORDER — IBUPROFEN 200 MG
24 TABLET ORAL
Status: DISCONTINUED | OUTPATIENT
Start: 2025-01-16 | End: 2025-01-20 | Stop reason: HOSPADM

## 2025-01-16 RX ORDER — NAPROXEN SODIUM 220 MG/1
81 TABLET, FILM COATED ORAL DAILY
Status: DISCONTINUED | OUTPATIENT
Start: 2025-01-17 | End: 2025-01-20 | Stop reason: HOSPADM

## 2025-01-16 RX ORDER — NALOXONE HCL 0.4 MG/ML
0.02 VIAL (ML) INJECTION
Status: DISCONTINUED | OUTPATIENT
Start: 2025-01-16 | End: 2025-01-20 | Stop reason: HOSPADM

## 2025-01-16 RX ORDER — ONDANSETRON 8 MG/1
8 TABLET, ORALLY DISINTEGRATING ORAL EVERY 8 HOURS PRN
Status: DISCONTINUED | OUTPATIENT
Start: 2025-01-16 | End: 2025-01-20 | Stop reason: HOSPADM

## 2025-01-16 RX ORDER — AMOXICILLIN 250 MG
1 CAPSULE ORAL DAILY PRN
Status: DISCONTINUED | OUTPATIENT
Start: 2025-01-16 | End: 2025-01-20 | Stop reason: HOSPADM

## 2025-01-16 RX ORDER — CEFTRIAXONE 2 G/1
2 INJECTION, POWDER, FOR SOLUTION INTRAMUSCULAR; INTRAVENOUS
Status: COMPLETED | OUTPATIENT
Start: 2025-01-16 | End: 2025-01-16

## 2025-01-16 RX ORDER — ATORVASTATIN CALCIUM 10 MG/1
40 TABLET, FILM COATED ORAL NIGHTLY
Status: DISCONTINUED | OUTPATIENT
Start: 2025-01-16 | End: 2025-01-16

## 2025-01-16 RX ORDER — GLUCAGON 1 MG
1 KIT INJECTION
Status: DISCONTINUED | OUTPATIENT
Start: 2025-01-16 | End: 2025-01-20 | Stop reason: HOSPADM

## 2025-01-16 RX ORDER — IPRATROPIUM BROMIDE AND ALBUTEROL SULFATE 2.5; .5 MG/3ML; MG/3ML
3 SOLUTION RESPIRATORY (INHALATION) EVERY 6 HOURS PRN
Status: DISCONTINUED | OUTPATIENT
Start: 2025-01-16 | End: 2025-01-20 | Stop reason: HOSPADM

## 2025-01-16 RX ORDER — IBUPROFEN 200 MG
16 TABLET ORAL
Status: DISCONTINUED | OUTPATIENT
Start: 2025-01-16 | End: 2025-01-20 | Stop reason: HOSPADM

## 2025-01-16 RX ORDER — SIMETHICONE 80 MG
1 TABLET,CHEWABLE ORAL 4 TIMES DAILY PRN
Status: DISCONTINUED | OUTPATIENT
Start: 2025-01-16 | End: 2025-01-20 | Stop reason: HOSPADM

## 2025-01-16 RX ORDER — METOPROLOL SUCCINATE 25 MG/1
25 TABLET, EXTENDED RELEASE ORAL DAILY
Status: DISCONTINUED | OUTPATIENT
Start: 2025-01-17 | End: 2025-01-20 | Stop reason: HOSPADM

## 2025-01-16 RX ORDER — PROCHLORPERAZINE EDISYLATE 5 MG/ML
5 INJECTION INTRAMUSCULAR; INTRAVENOUS EVERY 6 HOURS PRN
Status: DISCONTINUED | OUTPATIENT
Start: 2025-01-16 | End: 2025-01-20 | Stop reason: HOSPADM

## 2025-01-16 RX ADMIN — FAMOTIDINE 20 MG: 10 INJECTION, SOLUTION INTRAVENOUS at 06:01

## 2025-01-16 RX ADMIN — LATANOPROST 1 DROP: 50 SOLUTION OPHTHALMIC at 08:01

## 2025-01-16 RX ADMIN — LORAZEPAM 1 MG: 2 INJECTION INTRAMUSCULAR; INTRAVENOUS at 12:01

## 2025-01-16 RX ADMIN — INSULIN ASPART 3 UNITS: 100 INJECTION, SOLUTION INTRAVENOUS; SUBCUTANEOUS at 06:01

## 2025-01-16 RX ADMIN — SODIUM CHLORIDE 1905 ML: 9 INJECTION, SOLUTION INTRAVENOUS at 11:01

## 2025-01-16 RX ADMIN — METHYLPREDNISOLONE SODIUM SUCCINATE 125 MG: 125 INJECTION, POWDER, FOR SOLUTION INTRAMUSCULAR; INTRAVENOUS at 12:01

## 2025-01-16 RX ADMIN — LORAZEPAM 1 MG: 2 INJECTION INTRAMUSCULAR; INTRAVENOUS at 11:01

## 2025-01-16 RX ADMIN — MAGNESIUM SULFATE HEPTAHYDRATE 2 G: 40 INJECTION, SOLUTION INTRAVENOUS at 12:01

## 2025-01-16 RX ADMIN — IPRATROPIUM BROMIDE AND ALBUTEROL SULFATE 3 ML: 2.5; .5 SOLUTION RESPIRATORY (INHALATION) at 11:01

## 2025-01-16 RX ADMIN — IOHEXOL 75 ML: 350 INJECTION, SOLUTION INTRAVENOUS at 04:01

## 2025-01-16 RX ADMIN — CEFTRIAXONE 2 G: 2 INJECTION, POWDER, FOR SOLUTION INTRAMUSCULAR; INTRAVENOUS at 11:01

## 2025-01-16 RX ADMIN — METHYLPREDNISOLONE SODIUM SUCCINATE 60 MG: 125 INJECTION, POWDER, FOR SOLUTION INTRAMUSCULAR; INTRAVENOUS at 11:01

## 2025-01-16 NOTE — PLAN OF CARE
Case Management Assessment     PCP: Wilfredo De Souza MD   Pharmacy:   [id number] [address] AT [facility] [address] [place] [id number] [facility] [address]  Phone: [phone] Fax: [phone]    [facility] [place] [address] [address] [facility]  [address]   [place] [address]  Phone: [phone] Fax: [phone]       Patient Arrived From: home  Existing Help at Home: uncle available if needed    Barriers to Discharge: none    Discharge Plan:    A. tbd   B. tbd      Patient recently arrived in the ICU.  CM called son to complete DC needs assessment.  He stated that the patient lives alone, uses O2 24/7 and drives.  He does not presently have any services.   This patient has been screened for Case Management needs.  Treatment is ongoing in the ICU at this time.  CM contact information given to patient/family.  CM will continue to follow while in the ICU and assist with DC planning as needed.           01/16/25 1824   Discharge Assessment   Assessment Type Discharge Planning Assessment   Confirmed/corrected address, phone number and insurance Yes   Confirmed Demographics Correct on Facesheet   Source of Information family   Communicated DERIC with patient/caregiver Date not available/Unable to determine   People in Home alone   Do you expect to return to your current living situation? Yes   Do you have help at home or someone to help you manage your care at home? Yes   Who are your caregiver(s) and their phone number(s)? uncle available if needed   Prior to hospitilization cognitive status: Alert/Oriented   Current cognitive status: Alert/Oriented   Walking or Climbing Stairs Difficulty yes   Walking or Climbing Stairs ambulation difficulty, requires equipment   Mobility Management needs O2   Dressing/Bathing Difficulty no   Home Accessibility stairs to enter home   Number of Stairs, Main Entrance six   Surface of Stairs, Main Entrance hardwood   Stair Railings, Main  Entrance railings safe and in good condition   Home Layout Able to live on 1st floor   Equipment Currently Used at Home oxygen   Readmission within 30 days? No   Patient currently being followed by outpatient case management? No   Do you currently have service(s) that help you manage your care at home? No   Do you take prescription medications? Yes   Do you have prescription coverage? Yes   Do you have any problems affording any of your prescribed medications? No   Is the patient taking medications as prescribed? yes   How do you get to doctors appointments? car, drives self   Are you on dialysis? No   Do you take coumadin? No   Discharge Plan A   (tbd)   Discharge Plan B   (tbd)   DME Needed Upon Discharge    (tbd)   Discharge Plan discussed with: Adult children   Transition of Care Barriers None

## 2025-01-16 NOTE — ED TRIAGE NOTES
Pt presents with c/o np cough, shortness of breath, wears home oxygen at 3 L nc, reports chills 2 days ago. Symptoms started approx 2 days.

## 2025-01-16 NOTE — HPI
Emmanuel Grant is a 74 y.o. male who  has a past medical history of BPH, CAD, Chronic HFrEF, Hypertension, Kidney stone, Stroke, and Type 2 diabetes mellitus without complication, without long-term current use of insulin, presented to the ED with CC of SOB.     Patient has been SOB since 4am this morning and progressively worse. Did have URI symptoms for past 2-3 days. With intermittent fever/chills and cough. He is on home O2 at 2L,, but had to increase his home O2. In the ED, patient was decompensating and was nearly intubated. Given ativan on Bipap and was able to avoid intubation. Pulm consulted. Admitted to ICU. Maintaining his pH and labs unremarkable. COVID/FLU/RSV negative. BNP, trop, LA and procal all negative. HPI limited as patient is quite ill/tired on Bipap.

## 2025-01-16 NOTE — SUBJECTIVE & OBJECTIVE
"Past Medical History:   Diagnosis Date    CAD (coronary artery disease)     Sees [facility], h/o stent    Diabetes mellitus     Hypertension     Kidney stone     Stroke     age 62       Past Surgical History:   Procedure Laterality Date    24 HOUR IMPEDANCE PH MONITORING OF ESOPHAGUS IN PATIENT NOT TAKING ACID REDUCING MEDICATIONS N/A 11/13/2024    Procedure: IMPEDANCE PH STUDY, ESOPHAGEAL, 24 HOUR, IN PATIENT NOT TAKING ACID REDUCING MEDICATION;  Surgeon: Stephany Mendoza MD;  Location: [facility] (4TH FLR);  Service: Endoscopy;  Laterality: N/A;  referral dr miguel/ off ppi / prep inst sent to pt via mail  11/6- precall attempted no answer. Unable to [facility]-  11/8 - pt not called, per chart review patient currently inpatient at [place] [id number]    CARDIAC CATHETERIZATION      with stent    COLONOSCOPY N/A 03/27/2024    Procedure: COLONOSCOPY;  Surgeon: Ariela Castro MD;  Location: [facility] ENDO;  Service: Endoscopy;  Laterality: N/A;    ESOPHAGEAL MANOMETRY WITH MEASUREMENT OF IMPEDANCE N/A 11/13/2024    Procedure: MANOMETRY, ESOPHAGUS, WITH IMPEDANCE MEASUREMENT;  Surgeon: Stephany Mendoza MD;  Location: [facility] (4TH FLR);  Service: Endoscopy;  Laterality: N/A;    ESOPHAGOGASTRODUODENOSCOPY N/A 03/27/2024    Procedure: EGD (ESOPHAGOGASTRODUODENOSCOPY);  Surgeon: Ariela Castro MD;  Location: [facility] ENDO;  Service: Endoscopy;  Laterality: N/A;    LEFT HEART CATHETERIZATION Left 9/10/2024    Procedure: Left heart cath;  Surgeon: Jemal Drummond MD;  Location: [facility] CATH LAB;  Service: Cardiology;  Laterality: Left;    SHOULDER SURGERY Right        Review of patient's allergies indicates:   Allergen Reactions    Dapagliflozin      Other reaction(s): Other (See Comments)    Pcn [penicillins]     Linagliptin Other (See Comments)     "it knocked me down", "it almost killed me"    Lisinopril Other (See Comments)     cough    Pantoprazole Hives       No current facility-administered medications on file prior to " encounter.     Current Outpatient Medications on File Prior to Encounter   Medication Sig    albuterol-ipratropium (DUO-NEB) 2.5 mg-0.5 mg/3 mL nebulizer solution Take 3 mLs by nebulization every 6 (six) hours as needed for Wheezing. Rescue    amLODIPine (NORVASC) 10 MG tablet Take 1 tablet (10 mg total) by mouth once daily.    arformoteroL (BROVANA) 15 mcg/2 mL Nebu Take 2 mLs (15 mcg total) by nebulization 2 (two) times daily. Controller    aspirin 81 MG Chew Take 81 mg by mouth once daily.    atorvastatin (LIPITOR) 40 MG tablet Take 1 tablet (40 mg total) by mouth every evening.    blood-glucose sensor (FREESTYLE RAMBO 3 PLUS SENSOR) Blanche Change every 15 days    budesonide (PULMICORT) 0.5 mg/2 mL nebulizer solution Take 4 mLs (1 mg total) by nebulization 2 (two) times daily. Controller    carvediloL (COREG) 25 MG tablet Take 25 mg by mouth 2 (two) times daily with meals.    cholecalciferol, vitamin D3, (VITAMIN D3) 50 mcg (2,000 unit) Cap capsule Take 1 capsule by mouth once daily.    furosemide (LASIX) 20 MG tablet Take 1 tablet (20 mg total) by mouth once daily.    glimepiride (AMARYL) 2 MG tablet Take 1 tablet (2 mg total) by mouth 2 (two) times a day. Take with meals  Resume taking after completion of steroid taper    insulin glargine U-100, Lantus, (LANTUS SOLOSTAR U-100 INSULIN) 100 unit/mL (3 mL) InPn pen Inject 12 Units into the skin once daily.    latanoprost, PF, 0.005 % Drop 1 drop into affected eye in the evening Ophthalmic Once a day    metFORMIN (GLUCOPHAGE) 1000 MG tablet Take 1 tablet (1,000 mg total) by mouth 2 (two) times daily with meals.    metoprolol succinate (TOPROL-XL) 50 MG 24 hr tablet Take 1 tablet (50 mg total) by mouth once daily.    mirabegron (MYRBETRIQ) 25 mg Tb24 ER tablet Take 1 tablet (25 mg total) by mouth once daily.    mycophenolate (CELLCEPT) 250 mg Cap Take 4 capsules (1,000 mg total) by mouth 2 (two) times daily.    nitroGLYCERIN (NITROSTAT) 0.3 MG SL tablet Place 1 tablet  "(0.3 mg total) under the tongue every 5 (five) minutes as needed for Chest pain.    omeprazole (PRILOSEC) 40 MG capsule Take 40 mg by mouth once daily.    ONETOUCH DELICA PLUS LANCET 33 gauge Misc Apply topically 3 (three) times daily.    pen needle, diabetic 32 gauge x 5/32" Ndle 1 each by Misc.(Non-Drug; Combo Route) route once daily.    sacubitriL-valsartan (ENTRESTO) 24-26 mg per tablet Take 1 tablet by mouth 2 (two) times daily.    sertraline (ZOLOFT) 25 MG tablet Take 1 tablet (25 mg total) by mouth once daily.    sucralfate (CARAFATE) 1 gram tablet Take 1 tablet (1 g total) by mouth 4 (four) times daily before meals and nightly.     Family History       Problem Relation (Age of Onset)    Cancer Mother    Colon cancer Sister          Tobacco Use    Smoking status: Never     Passive exposure: Never    Smokeless tobacco: Never   Substance and Sexual Activity    Alcohol use: No    Drug use: Never    Sexual activity: Not Currently     Review of Systems   Constitutional:  Positive for activity change and fatigue. Negative for fever and unexpected weight change.   HENT:  Negative for trouble swallowing and voice change.    Eyes:  Negative for photophobia and visual disturbance.   Respiratory:  Positive for cough, chest tightness, shortness of breath and wheezing.    Cardiovascular:  Negative for chest pain and leg swelling.   Gastrointestinal:  Positive for nausea. Negative for blood in stool.   Endocrine: Negative for polydipsia and polyuria.   Genitourinary:  Negative for difficulty urinating and hematuria.   Musculoskeletal:  Negative for neck pain and neck stiffness.   Skin:  Negative for pallor and rash.   Allergic/Immunologic: Negative for food allergies and immunocompromised state.   Neurological:  Negative for seizures and syncope.   Psychiatric/Behavioral:  Negative for agitation, behavioral problems and confusion.      Objective:     Vital Signs (Most Recent):  Temp: 97.7 °F (36.5 °C) (01/16/25 " 1628)  Pulse: (!) 114 (01/16/25 1700)  Resp: (!) 27 (01/16/25 1700)  BP: 127/84 (01/16/25 1700)  SpO2: 99 % (01/16/25 1700) Vital Signs (24h Range):  Temp:  [97.6 °F (36.4 °C)-97.8 °F (36.6 °C)] 97.7 °F (36.5 °C)  Pulse:  [108-148] 114  Resp:  [20-58] 27  SpO2:  [91 %-100 %] 99 %  BP: (109-168)/() 127/84     Weight: 59.1 kg (130 lb 4.7 oz)  Body mass index is 19.81 kg/m².     Physical Exam  Vitals and nursing note reviewed.   Constitutional:       General: He is not in acute distress.     Appearance: He is well-developed. He is ill-appearing. He is not diaphoretic.   HENT:      Head: Normocephalic and atraumatic.   Eyes:      General: No scleral icterus.  Neck:      Thyroid: No thyromegaly.   Cardiovascular:      Rate and Rhythm: Normal rate and regular rhythm.      Heart sounds: No murmur heard.  Pulmonary:      Breath sounds: No stridor. Wheezing and rhonchi present.   Abdominal:      General: There is no distension.      Palpations: Abdomen is soft.      Tenderness: There is no guarding.   Musculoskeletal:         General: No swelling or deformity. Normal range of motion.      Cervical back: Normal range of motion.   Skin:     General: Skin is warm.      Capillary Refill: Capillary refill takes less than 2 seconds.      Coloration: Skin is not jaundiced.      Findings: No bruising.   Neurological:      Mental Status: He is alert and oriented to person, place, and time. Mental status is at baseline.      Cranial Nerves: No cranial nerve deficit.      Motor: No weakness.   Psychiatric:         Mood and Affect: Mood normal.         Behavior: Behavior normal.                    Recent Results (from the past 24 hours)   CBC auto differential    Collection Time: 01/16/25 11:13 AM   Result Value Ref Range    WBC 9.87 3.90 - 12.70 K/uL    RBC 5.33 4.60 - 6.20 M/uL    Hemoglobin 14.4 14.0 - 18.0 g/dL    Hematocrit 43.3 40.0 - 54.0 %    MCV 81 (L) 82 - 98 fL    MCH 27.0 27.0 - 31.0 pg    MCHC 33.3 32.0 - 36.0 g/dL     RDW 12.9 11.5 - 14.5 %    Platelets 382 150 - 450 K/uL    MPV 9.5 9.2 - 12.9 fL    Immature Granulocytes 0.3 0.0 - 0.5 %    Gran # (ANC) 7.8 (H) 1.8 - 7.7 K/uL    Immature Grans (Abs) 0.03 0.00 - 0.04 K/uL    Lymph # 1.0 1.0 - 4.8 K/uL    Mono # 0.7 0.3 - 1.0 K/uL    Eos # 0.3 0.0 - 0.5 K/uL    Baso # 0.02 0.00 - 0.20 K/uL    nRBC 0 0 /100 WBC    Gran % 79.3 (H) 38.0 - 73.0 %    Lymph % 10.2 (L) 18.0 - 48.0 %    Mono % 7.2 4.0 - 15.0 %    Eosinophil % 2.8 0.0 - 8.0 %    Basophil % 0.2 0.0 - 1.9 %    Differential Method Automated    Comprehensive metabolic panel    Collection Time: 01/16/25 11:13 AM   Result Value Ref Range    Sodium 134 (L) 136 - 145 mmol/L    Potassium 3.4 (L) 3.5 - 5.1 mmol/L    Chloride 97 95 - 110 mmol/L    CO2 24 23 - 29 mmol/L    Glucose 278 (H) 70 - 110 mg/dL    BUN 12 8 - 23 mg/dL    Creatinine 1.1 0.5 - 1.4 mg/dL    Calcium 10.6 (H) 8.7 - 10.5 mg/dL    Total Protein 8.3 6.0 - 8.4 g/dL    Albumin 3.8 3.5 - 5.2 g/dL    Total Bilirubin 0.7 0.1 - 1.0 mg/dL    Alkaline Phosphatase 79 40 - 150 U/L    AST 21 10 - 40 U/L    ALT 14 10 - 44 U/L    eGFR >60 >60 mL/min/1.73 m^2    Anion Gap 13 8 - 16 mmol/L   Troponin I    Collection Time: 01/16/25 11:13 AM   Result Value Ref Range    Troponin I 0.019 0.000 - 0.026 ng/mL   Brain natriuretic peptide    Collection Time: 01/16/25 11:13 AM   Result Value Ref Range    BNP 56 0 - 99 pg/mL   Lactic acid, plasma    Collection Time: 01/16/25 11:13 AM   Result Value Ref Range    Lactate (Lactic Acid) 3.2 (H) 0.5 - 2.2 mmol/L   D dimer, quantitative    Collection Time: 01/16/25 11:13 AM   Result Value Ref Range    D-Dimer 0.50 (H) <0.50 mg/L FEU   Iron and TIBC    Collection Time: 01/16/25 11:13 AM   Result Value Ref Range    Iron 32 (L) 45 - 160 ug/dL    Transferrin 188 (L) 200 - 375 mg/dL    TIBC 278 250 - 450 ug/dL    Saturated Iron 12 (L) 20 - 50 %   Ferritin    Collection Time: 01/16/25 11:13 AM   Result Value Ref Range    Ferritin 230 20.0 - 300.0 ng/mL    RSV Antigen Detection Nasopharyngeal Swab    Collection Time: 01/16/25 11:14 AM   Result Value Ref Range    RSV Source Nasopharyngeal Swab     RSV Ag by Molecular Method Negative Negative   POCT Influenza A/B Molecular    Collection Time: 01/16/25 11:48 AM   Result Value Ref Range    POC Molecular Influenza A Ag Negative Negative    POC Molecular Influenza B Ag Negative Negative     Acceptable Yes    POCT COVID-19 Rapid Screening    Collection Time: 01/16/25 11:48 AM   Result Value Ref Range    POC Rapid COVID Negative Negative     Acceptable Yes    Procalcitonin    Collection Time: 01/16/25 11:59 AM   Result Value Ref Range    Procalcitonin 0.07 <0.25 ng/mL   ISTAT PROCEDURE    Collection Time: 01/16/25 12:20 PM   Result Value Ref Range    POC PH 7.385 7.35 - 7.45    POC PCO2 37.9 35 - 45 mmHg    POC PO2 101 (H) 80 - 100 mmHg    POC HCO3 22.7 (L) 24 - 28 mmol/L    POC BE -2 -2 to 2 mmol/L    POC SATURATED O2 98 95 - 100 %    POC TCO2 24 23 - 27 mmol/L    Rate 75     Sample ARTERIAL     Site RR     Allens Test Pass     DelSys CPAP/BiPAP     Mode BiPAP     FiO2 30     Spont Rate 59     Sp02 93     IP 15     EP 8    POCT glucose    Collection Time: 01/16/25  3:35 PM   Result Value Ref Range    POCT Glucose 266 (H) 70 - 110 mg/dL   Lactic acid, plasma #2    Collection Time: 01/16/25  3:38 PM   Result Value Ref Range    Lactate (Lactic Acid) 1.6 0.5 - 2.2 mmol/L   POCT glucose    Collection Time: 01/16/25  5:21 PM   Result Value Ref Range    POCT Glucose 299 (H) 70 - 110 mg/dL       Microbiology Results (last 7 days)       Procedure Component Value Units Date/Time    Clostridium difficile EIA [[id number]]     Order Status: No result Specimen: Stool     Blood culture x two cultures. Draw prior to antibiotics. [[id number]] Collected: 01/16/25 1150    Order Status: Sent Specimen: Blood from Peripheral, Antecubital, Left Updated: 01/16/25 1220    Blood culture x two cultures. Draw prior to  antibiotics. [[id number]] Collected: 01/16/25 1158    Order Status: Sent Specimen: Blood from Peripheral, Wrist, Right Updated: 01/16/25 1220             Imaging Results              CTA Chest Non-Coronary (PE Studies) (Final result)  Result time 01/16/25 16:20:03      Final result by Isiah Sahni MD (01/16/25 16:20:03)                   Impression:      No evidence of pulmonary embolism.    Chronic interstitial lung disease likely representing IUP.  Findings are similar compared to prior exam.      Electronically signed by: Isiah Sahni MD  Date:    01/16/2025  Time:    16:20               Narrative:    EXAMINATION:  CTA CHEST NON CORONARY (PE STUDIES)    CLINICAL HISTORY:  Pulmonary embolism (PE) suspected, unknown D-dimer;    TECHNIQUE:  Low dose axial images, sagittal and coronal reformations were obtained from the thoracic inlet to the lung bases following the IV administration of 75 mL of Omnipaque 350.  Contrast timing was optimized to evaluate the pulmonary arteries.  MIP images were performed.    COMPARISON:  CTA chest from 12/12/2024.    FINDINGS:  Pulmonary Arteries: This study is adequate for the evaluation of pulmonary thromboembolism.  No evidence of pulmonary embolism to the level of the subsegmental arteries bilaterally.    Soft tissues of the neck:  Thyroid is enlarged in size.    Thoracic aorta:  Normal in caliber and contour.  No evidence of dissection.    Heart: No cardiomegaly.  No pericardial effusion.    Lymph nodes:  Prominent right paratracheal lymph node measuring 1.4 cm, previously measuring 1.4 cm.    Trachea and bronchi: Patent.    Lungs:  Lungs demonstrate extensive honeycombing with a lower lobe and peripheral predominance with volume loss and fibrotic changes.  Findings are consistent with interstitial lung disease, likely IUP.  No new opacity within the lungs when compared to prior exam.    Limited evaluation of the upper abdomen:  Spleen is enlarged in size measuring  12.0 cm.    Osseous structures:  No evidence of fractures or suspicious osseous lesions.                                       X-Ray Chest AP Portable (Final result)  Result time 01/16/25 12:03:22      Final result by Chris Patterson MD (01/16/25 12:03:22)                   Impression:      No interval change.  Persistent findings of interstitial lung disease      Electronically signed by: Chris Patterson MD  Date:    01/16/2025  Time:    12:03               Narrative:    EXAMINATION:  XR CHEST AP PORTABLE    CLINICAL HISTORY:  Shortness of breath;    TECHNIQUE:  Single views of the chest were performed.    COMPARISON:  Chest radiograph dated December 12, 2024    FINDINGS:  The trachea and cardiomediastinal silhouette remains stable.  As seen on the prior study, there are bilateral interstitial opacities with peripheral reticulation in the lungs suggestive of interstitial lung disease.  Overall, when compared with the prior study, there is no significant change in the cardiopulmonary status of the patient.  No pneumothorax.  Osseous structures demonstrate no evidence for acute fractures or dislocations.

## 2025-01-16 NOTE — ED NOTES
Pt resting, appears comfortable, on bipap, respirations easy and unlabored. Pt on cardiac monitor, bp cuff and continuous pulse ox. Call light within reach. Will continue to closely monitor.

## 2025-01-16 NOTE — HPI
73 yo male with h/o ILD recently established with pulmonary and on CellCept but yet to obtain PFTs so unable to get Ofev, multiple hospitalizations with ILD exacerbations who presents with increased dyspnea and severe anxiety. He was evaluated by hospitalist and it was felt anxiety was driving intolerance of the BiPAP. He received BZ and on my assessment he is sedate but arousable. He is synchronous with the BiPAP with 100% Sat with FiO2 of 30% but remains very tachypnic with MV over 16L/min. He is not able to provide any history. No family available at bedside. Chart reviewed. Case discussed with hospitalist, JARED RN.

## 2025-01-16 NOTE — ASSESSMENT & PLAN NOTE
Hold CellCept. Need to rule out infectious causes of presentation. Hold on empiric antibiotics. PJP less likely on CellCept

## 2025-01-16 NOTE — CONSULTS
[facility]  Critical Care Medicine  Consult Note    Patient Name: Emmanuel Grant  MRN: [id number]  Admission Date: 1/16/2025  Hospital Length of Stay: 0 days  Code Status: Full Code  Attending Physician: Kristian Han MD   Primary Care Provider: Wilfredo De Souza MD   Principal Problem: <principal problem not specified>    Consults  Subjective:     HPI:  73 yo male with h/o ILD recently established with pulmonary and on CellCept but yet to obtain PFTs so unable to get Ofev, multiple hospitalizations with ILD exacerbations who presents with increased dyspnea and severe anxiety. He was evaluated by hospitalist and it was felt anxiety was driving intolerance of the BiPAP. He received BZ and on my assessment he is sedate but arousable. He is synchronous with the BiPAP with 100% Sat with FiO2 of 30% but remains very tachypnic with MV over 16L/min. He is not able to provide any history. No family available at bedside. Chart reviewed. Case discussed with hospitalist, ER RN.    Hospital/ICU Course:  No notes on file    Past Medical History:   Diagnosis Date    CAD (coronary artery disease)     Sees [facility], h/o stent    Diabetes mellitus     Hypertension     Kidney stone     Stroke     age 62       Past Surgical History:   Procedure Laterality Date    24 HOUR IMPEDANCE PH MONITORING OF ESOPHAGUS IN PATIENT NOT TAKING ACID REDUCING MEDICATIONS N/A 11/13/2024    Procedure: IMPEDANCE PH STUDY, ESOPHAGEAL, 24 HOUR, IN PATIENT NOT TAKING ACID REDUCING MEDICATION;  Surgeon: Stephany Mendoza MD;  Location: [facility] ([address] [place] [address]);  Service: Endoscopy;  Laterality: N/A;  referral dr miguel/ off ppi / prep inst sent to pt via mail  11/6- precall attempted no answer. Unable to [place] [facility]-  11/8 - pt not called, per chart review patient currently inpatient at [place] [id number]    CARDIAC CATHETERIZATION      with stent    COLONOSCOPY N/A 03/27/2024    Procedure: COLONOSCOPY;  Surgeon: Ariela Castro MD;  Location: [facility];   "Service: Endoscopy;  Laterality: N/A;    ESOPHAGEAL MANOMETRY WITH MEASUREMENT OF IMPEDANCE N/A 11/13/2024    Procedure: MANOMETRY, ESOPHAGUS, WITH IMPEDANCE MEASUREMENT;  Surgeon: Stephany Mendoza MD;  [facility]  Service: Endoscopy;  Laterality: N/A;    ESOPHAGOGASTRODUODENOSCOPY N/A 03/27/2024    Procedure: EGD (ESOPHAGOGASTRODUODENOSCOPY);  Surgeon: Ariela Castro MD;  Location: [facility];  Service: Endoscopy;  Laterality: N/A;    LEFT HEART CATHETERIZATION Left 9/10/2024    Procedure: Left heart cath;  Surgeon: Jemal Drummond MD;  Location: [facility] CATH LAB;  Service: Cardiology;  Laterality: Left;    SHOULDER SURGERY Right        Review of patient's allergies indicates:   Allergen Reactions    Dapagliflozin      Other reaction(s): Other (See Comments)    Pcn [penicillins]     Linagliptin Other (See Comments)     "it knocked me down", "it almost killed me"    Lisinopril Other (See Comments)     cough    Pantoprazole Hives       Family History       Problem Relation (Age of Onset)    Cancer Mother    Colon cancer Sister          Tobacco Use    Smoking status: Never     Passive exposure: Never    Smokeless tobacco: Never   Substance and Sexual Activity    Alcohol use: No    Drug use: Never    Sexual activity: Not Currently         Review of Systems   Unable to perform ROS: Severe respiratory distress     Objective:     Vital Signs (Most Recent):  Temp: 97.8 °F (36.6 °C) (01/16/25 1315)  Pulse: 108 (01/16/25 1520)  Resp: (!) 27 (01/16/25 1520)  BP: 130/84 (01/16/25 1520)  SpO2: 99 % (01/16/25 1520) Vital Signs (24h Range):  Temp:  [97.6 °F (36.4 °C)-97.8 °F (36.6 °C)] 97.8 °F (36.6 °C)  Pulse:  [108-148] 108  Resp:  [20-58] 27  SpO2:  [91 %-100 %] 99 %  BP: (109-168)/(69-95) 130/84     Weight: 63.5 kg (140 lb)  Body mass index is 21.29 kg/m².      Intake/Output Summary (Last 24 hours) at 1/16/2025 1554  Last data filed at 1/16/2025 1418  Gross per 24 hour   Intake 653.25 ml   Output " --   Net 653.25 ml        Physical Exam  Vitals reviewed.   Constitutional:       General: He is in acute distress.      Appearance: He is ill-appearing.   HENT:      Head: Normocephalic and atraumatic.      Comments: Bipap mask with minimal leak  Cardiovascular:      Rate and Rhythm: Regular rhythm. Tachycardia present.      Heart sounds: Normal heart sounds.   Pulmonary:      Effort: Respiratory distress present.      Breath sounds: Rales present. No wheezing.   Abdominal:      General: Abdomen is flat.      Palpations: Abdomen is soft.   Musculoskeletal:      Right lower leg: Edema present.      Left lower leg: Edema present.   Skin:     General: Skin is warm and dry.      Capillary Refill: Capillary refill takes less than 2 seconds.   Neurological:      Mental Status: He is disoriented.      Comments: Sedate          Vents:  Oxygen Concentration (%): 30 (01/16/25 1126)    Lines/Drains/Airways       Peripheral Intravenous Line  Duration                  Peripheral IV - Single Lumen 01/16/25 1059 20 G Left Antecubital <1 day         Peripheral IV - Single Lumen 01/16/25 1104 22 G Right Wrist <1 day                    Significant Labs:    CBC/Anemia Profile:  Recent Labs   Lab 01/16/25  1113   WBC 9.87   HGB 14.4   HCT 43.3      MCV 81*   RDW 12.9        Chemistries:  Recent Labs   Lab 01/16/25  1113   *   K 3.4*   CL 97   CO2 24   BUN 12   CREATININE 1.1   CALCIUM 10.6*   ALBUMIN 3.8   PROT 8.3   BILITOT 0.7   ALKPHOS 79   ALT 14   AST 21       ABGs:   Recent Labs   Lab 01/16/25  1220   PH 7.385   PCO2 37.9   HCO3 22.7*   POCSATURATED 98   BE -2     All pertinent labs within the past 24 hours have been reviewed.    Significant Imaging:   I have reviewed all pertinent imaging results/findings within the past 24 hours.  CXR: I have reviewed all pertinent results/findings within the past 24 hours and my personal findings are:  Persistent ILD, unable to assess for acute flair  Tely shows sinus  tachycardia    ABG  Recent Labs   Lab 01/16/25  1220   PH 7.385   PO2 101*   PCO2 37.9   HCO3 22.7*   BE -2     Assessment/Plan:     Pulmonary  Acute hypoxic respiratory failure  Patient with Hypoxic Respiratory failure which is Acute on chronic.  he is on home oxygen at 2 LPM. Supplemental oxygen was provided and noted- Oxygen Concentration (%):  [30] 30    .   Signs/symptoms of respiratory failure include- increased work of breathing and respiratory distress. Contributing diagnoses includes - Interstitial lung disease Labs and images were reviewed. Patient Has recent ABG, which has been reviewed. Will treat underlying causes and adjust management of respiratory failure as follows- Treat for ILD exacerbation. Continue BiPAP for now. Abysmal prognosis if intubated and will address code status when patient able.    Interstitial lung disease exacerbation  Treat with steroids as has been responsive in the past. Assess for acute infections with resp panel. Hold CellCept.  -needs outpatient PFTs and consideration for Ofev  -likely needs palliative care with recurrent admissions.  -Rule out PE with CTA.  -Hold lipitor as can be associated with ILD  -send home on Crestor as much lower association with ILD    Cardiac/Vascular  Coronary artery disease involving native coronary artery of native heart with angina pectoris  Noted. Will need to switch his statin as outpatient    Immunology/Multi System  Immunosuppression due to drug therapy  Hold CellCept. Need to rule out infectious causes of presentation. Hold on empiric antibiotics. PJP less likely on CellCept         Critical Care Time: 50 minutes  Critical secondary to Patient has a condition that poses threat to life and bodily function: Acute on chronic hypoxic resp failure     Critical care was time spent personally by me on the following activities: development of treatment plan with patient or surrogate and bedside caregivers, discussions with consultants, evaluation  of patient's response to treatment, examination of patient, ordering and performing treatments and interventions, ordering and review of laboratory studies, ordering and review of radiographic studies, pulse oximetry, re-evaluation of patient's condition. This critical care time did not overlap with that of any other provider or involve time for any procedures.    Thank you for your consult. I will follow-up with patient. Please contact us if you have any additional questions.     Jeff Waldrop MD  Critical Care Medicine  [facility] - [place] - Emergency Dept

## 2025-01-16 NOTE — ASSESSMENT & PLAN NOTE
Treat with steroids as has been responsive in the past. Assess for acute infections with resp panel. Hold CellCept.  -needs outpatient PFTs and consideration for Ofev  -likely needs palliative care with recurrent admissions.  -Rule out PE with CTA.  -Hold lipitor as can be associated with ILD  -send home on Crestor as much lower association with ILD

## 2025-01-16 NOTE — ASSESSMENT & PLAN NOTE
Patient with Hypoxic Respiratory failure which is Acute on chronic.    he is on home oxygen at 2 LPM. Supplemental oxygen was provided and noted- Oxygen Concentration (%):  [30] 3  Signs/symptoms of respiratory failure include- increased work of breathing and respiratory distress.   Contributing diagnoses includes - Interstitial lung disease Labs and images were reviewed.   Patient Has recent ABG, which has been reviewed.   Will treat underlying causes and adjust management of respiratory failure as follows- Treat for ILD exacerbation. Continue BiPAP for now.

## 2025-01-16 NOTE — ASSESSMENT & PLAN NOTE
Patient with Hypoxic Respiratory failure which is Acute on chronic.  he is on home oxygen at 2 LPM. Supplemental oxygen was provided and noted- Oxygen Concentration (%):  [30] 30    .   Signs/symptoms of respiratory failure include- increased work of breathing and respiratory distress. Contributing diagnoses includes - Interstitial lung disease Labs and images were reviewed. Patient Has recent ABG, which has been reviewed. Will treat underlying causes and adjust management of respiratory failure as follows- Treat for ILD exacerbation. Continue BiPAP for now. Abysmal prognosis if intubated and will address code status when patient able.

## 2025-01-16 NOTE — ED NOTES
Pt appears more relaxed, breathing becoming less labored, pt able to lay his head back onto the pillow and rest. Pt continues on cardiac monitor, bp cuff and continuous pulse ox. Call light within reach. Will continue to monitor.

## 2025-01-16 NOTE — ED PROVIDER NOTES
"Encounter Date: 1/16/2025    SCRIBE #1 NOTE: I, Edita Khanna, am scribing for, and in the presence of,  Brandon Tijerina MD. I have scribed the following portions of the note - Other sections scribed: hpi,ros,pe.       History     Chief Complaint   Patient presents with    Shortness of Breath     Pt c/o SOB starting at 0400 this morning. Moderate distress noted at present. Currently on 3lpm NC. Pt denies COPD or CHF. RR labored at present. Struggling to complete sentences      Emmanuel Grant is a 74 y.o. male, with a PMHx of CAD, DM, HTN, and stroke, who presents to the ED with shortness of breath onset this morning. Patient reports associated productive cough, intermittent fever, and intermittent chills for a couple of days. Patient states he had shortness of breath for 1 year but today symptoms worsened. Patient works with mold and A/C. Patient wears 3L of oxygen. No other exacerbating or alleviating factors. Denies chest pain or other associated symptoms.      The history is provided by the patient. No  was used.     Review of patient's allergies indicates:   Allergen Reactions    Dapagliflozin      Other reaction(s): Other (See Comments)    Pcn [penicillins]     Linagliptin Other (See Comments)     "it knocked me down", "it almost killed me"    Lisinopril Other (See Comments)     cough    Pantoprazole Hives     Past Medical History:   Diagnosis Date    CAD (coronary artery disease)     Sees [facility], h/o stent    Diabetes mellitus     Hypertension     Kidney stone     Stroke     age 62     Past Surgical History:   Procedure Laterality Date    24 HOUR IMPEDANCE PH MONITORING OF ESOPHAGUS IN PATIENT NOT TAKING ACID REDUCING MEDICATIONS N/A 11/13/2024    Procedure: IMPEDANCE PH STUDY, ESOPHAGEAL, 24 HOUR, IN PATIENT NOT TAKING ACID REDUCING MEDICATION;  Surgeon: Stephany Mendoza MD;  Location: [facility] ([address] [place] [address]);  Service: Endoscopy;  Laterality: N/A;  referral dr miguel/ off ppi / prep " inst sent to pt via mail  11/6- precall attempted no answer. Unable to [facility]-  11/8 - pt not called, per chart review patient currently inpatient at [place] [id number]    CARDIAC CATHETERIZATION      with stent    COLONOSCOPY N/A 03/27/2024    Procedure: COLONOSCOPY;  Surgeon: Ariela Castro MD;  Location: [facility] ENDO;  Service: Endoscopy;  Laterality: N/A;    ESOPHAGEAL MANOMETRY WITH MEASUREMENT OF IMPEDANCE N/A 11/13/2024    Procedure: MANOMETRY, ESOPHAGUS, WITH IMPEDANCE MEASUREMENT;  Surgeon: Stephany Mendoza MD;  Location: [facility] ENDO ([facility] FLR);  Service: Endoscopy;  Laterality: N/A;    ESOPHAGOGASTRODUODENOSCOPY N/A 03/27/2024    Procedure: EGD (ESOPHAGOGASTRODUODENOSCOPY);  Surgeon: Ariela Castro MD;  Location: [facility] ENDO;  Service: Endoscopy;  Laterality: N/A;    LEFT HEART CATHETERIZATION Left 9/10/2024    Procedure: Left heart cath;  Surgeon: Jemal Drummond MD;  Location: [facility] CATH LAB;  Service: Cardiology;  Laterality: Left;    SHOULDER SURGERY Right      Family History   Problem Relation Name Age of Onset    Cancer Mother      Colon cancer Sister      Esophageal cancer Neg Hx       Social History     Tobacco Use    Smoking status: Never     Passive exposure: Never    Smokeless tobacco: Never   Substance Use Topics    Alcohol use: No    Drug use: Never     Review of Systems   Constitutional:  Positive for chills and fever.   HENT: Negative.  Negative for sore throat.    Eyes: Negative.    Respiratory:  Positive for cough (productive) and shortness of breath.    Cardiovascular: Negative.  Negative for chest pain.   Gastrointestinal: Negative.  Negative for nausea and vomiting.   Endocrine: Negative.    Genitourinary: Negative.  Negative for dysuria.   Musculoskeletal: Negative.  Negative for myalgias.   Skin:  Negative for rash.   Allergic/Immunologic: Negative.    Neurological: Negative.  Negative for headaches.   Hematological: Negative.  Negative for adenopathy.   Psychiatric/Behavioral:  Negative.  Negative for behavioral problems.    All other systems reviewed and are negative.      Physical Exam     Initial Vitals [01/16/25 1048]   BP Pulse Resp Temp SpO2   139/84 (!) 134 (!) 48 97.6 °F (36.4 °C) (!) 91 %      MAP       --         Physical Exam    Nursing note and vitals reviewed.  Constitutional: He appears well-developed and well-nourished.   HENT:   Head: Normocephalic and atraumatic.   Eyes: Conjunctivae and EOM are normal. Pupils are equal, round, and reactive to light.   Neck: Neck supple. No thyroid mass and no thyromegaly present. No JVD present.   Cardiovascular:  Regular rhythm, S1 normal, S2 normal, normal heart sounds and intact distal pulses.   Tachycardia present.   Exam reveals no gallop and no friction rub.       No murmur heard.  Pulmonary/Chest: He is in respiratory distress.   Breathing through belly. Course rhonchi and wheezing in bilateral lungs, R>L. No HJR.   Abdominal: Abdomen is soft. Bowel sounds are normal. There is no splenomegaly or hepatomegaly. There is no abdominal tenderness. No hernia.   No right CVA tenderness.  No left CVA tenderness. There is no rebound, no guarding, no tenderness at McBurney's point and negative Joy's sign.   Musculoskeletal:         General: No edema. Normal range of motion.      Cervical back: Neck supple.      Comments: No peripheral edema.     Lymphadenopathy:     He has no axillary adenopathy.   Neurological: He is alert and oriented to person, place, and time. GCS eye subscore is 4. GCS verbal subscore is 5. GCS motor subscore is 6.   Skin: Skin is warm, dry and intact. Capillary refill takes less than 2 seconds.   Psychiatric: He has a normal mood and affect. His speech is normal. Judgment normal. Cognition and memory are normal.         ED Course   Critical Care    Date/Time: 1/16/2025 1:53 PM    Performed by: Brandon Tijerina MD  Authorized by: Brandon Tijerina MD  Direct patient critical care time: 10 minutes  Additional  history critical care time: 11 minutes  Ordering / reviewing critical care time: 12 minutes  Documentation critical care time: 12 minutes  Consulting other physicians critical care time: 8 minutes  Total critical care time (exclusive of procedural time) : 53 minutes  Critical care time was exclusive of separately billable procedures and treating other patients and teaching time.  Critical care was necessary to treat or prevent imminent or life-threatening deterioration of the following conditions: respiratory failure.  Critical care was time spent personally by me on the following activities: evaluation of patient's response to treatment, examination of patient, ordering and performing treatments and interventions, ordering and review of laboratory studies, ordering and review of radiographic studies, pulse oximetry, re-evaluation of patient's condition, review of old charts and transcutaneous pacing.        Labs Reviewed   CBC W/ AUTO DIFFERENTIAL - Abnormal       Result Value    WBC 9.87      RBC 5.33      Hemoglobin 14.4      Hematocrit 43.3      MCV 81 (*)     MCH 27.0      MCHC 33.3      RDW 12.9      Platelets 382      MPV 9.5      Immature Granulocytes 0.3      Gran # (ANC) 7.8 (*)     Immature Grans (Abs) 0.03      Lymph # 1.0      Mono # 0.7      Eos # 0.3      Baso # 0.02      nRBC 0      Gran % 79.3 (*)     Lymph % 10.2 (*)     Mono % 7.2      Eosinophil % 2.8      Basophil % 0.2      Differential Method Automated     COMPREHENSIVE METABOLIC PANEL - Abnormal    Sodium 134 (*)     Potassium 3.4 (*)     Chloride 97      CO2 24      Glucose 278 (*)     BUN 12      Creatinine 1.1      Calcium 10.6 (*)     Total Protein 8.3      Albumin 3.8      Total Bilirubin 0.7      Alkaline Phosphatase 79      AST 21      ALT 14      eGFR >60      Anion Gap 13     LACTIC ACID, PLASMA - Abnormal    Lactate (Lactic Acid) 3.2 (*)    D DIMER, QUANTITATIVE - Abnormal    D-Dimer 0.50 (*)    ISTAT PROCEDURE - Abnormal    POC PH  7.385      POC PCO2 37.9      POC PO2 101 (*)     POC HCO3 22.7 (*)     POC BE -2      POC SATURATED O2 98      POC TCO2 24      Rate 75      Sample ARTERIAL      Site RR      Allens Test Pass      DelSys CPAP/BiPAP      Mode BiPAP      FiO2 30      Spont Rate 59      Sp02 93      IP 15      EP 8     CULTURE, BLOOD   CULTURE, BLOOD   CLOSTRIDIUM DIFFICILE   TROPONIN I    Troponin I 0.019     B-TYPE NATRIURETIC PEPTIDE    BNP 56     RSV ANTIGEN DETECTION    RSV Source Nasopharyngeal Swab      RSV Ag by Molecular Method Negative     PROCALCITONIN    Procalcitonin 0.07     FERRITIN   IRON AND TIBC   URINALYSIS   LACTIC ACID, PLASMA   URINALYSIS, REFLEX TO URINE CULTURE   POCT INFLUENZA A/B MOLECULAR    POC Molecular Influenza A Ag Negative      POC Molecular Influenza B Ag Negative       Acceptable Yes     SARS-COV-2 RDRP GENE    POC Rapid COVID Negative       Acceptable Yes       EKG Readings: (Independently Interpreted)   Rhythm: Sinus Tachycardia. Heart Rate: 131. Ectopy: No Ectopy. Conduction: Normal. ST Segments: Normal ST Segments. Q Waves: V2 and V3. Other Impression: Q-waves in septal lateral leads consistent with prior EKG       Imaging Results              CTA Chest Non-Coronary (PE Studies) (In process)                      X-Ray Chest AP Portable (Final result)  Result time 01/16/25 12:03:22      Final result by Chris Patterson MD (01/16/25 12:03:22)                   Impression:      No interval change.  Persistent findings of interstitial lung disease      Electronically signed by: Chris Patterson MD  Date:    01/16/2025  Time:    12:03               Narrative:    EXAMINATION:  XR CHEST AP PORTABLE    CLINICAL HISTORY:  Shortness of breath;    TECHNIQUE:  Single views of the chest were performed.    COMPARISON:  Chest radiograph dated December 12, 2024    FINDINGS:  The trachea and cardiomediastinal silhouette remains stable.  As seen on the prior study, there are bilateral  interstitial opacities with peripheral reticulation in the lungs suggestive of interstitial lung disease.  Overall, when compared with the prior study, there is no significant change in the cardiopulmonary status of the patient.  No pneumothorax.  Osseous structures demonstrate no evidence for acute fractures or dislocations.                                       Medications   iohexoL (OMNIPAQUE 350) injection 75 mL (has no administration in time range)   albuterol-ipratropium 2.5 mg-0.5 mg/3 mL nebulizer solution 3 mL (3 mLs Nebulization Given 1/16/25 1126)   LORazepam injection 1 mg (1 mg Intravenous Given 1/16/25 1148)   sodium chloride 0.9% bolus 1,905 mL 1,905 mL (0 mLs Intravenous Stopped 1/16/25 1215)   cefTRIAXone injection 2 g (2 g Intravenous Given 1/16/25 1159)   methylPREDNISolone sodium succinate injection 125 mg (125 mg Intravenous Given 1/16/25 1215)   magnesium sulfate 2g in water 50mL IVPB (premix) (0 g Intravenous Stopped 1/16/25 1418)   LORazepam injection 1 mg (1 mg Intravenous Given 1/16/25 1234)     Medical Decision Making  74-year-old male with a history interstitial lung disease presented to the emergency department significant respiratory distress. Patient is breathing approximately 50 times per minute in his oxygen saturations were in the upper 80s low 90s. This would appear to have been a exacerbation of pulmonary fibrosis. However could not rule out the possibility of infectious etiology so subsequent to this the patient received a septic workup and IV antibiotic therapy as well as some fluid therapy. Patient is currently on BiPAP and was not initially tolerating it that with the addition of magnesium, albuterol and Ativan the patient's now doing much better breathing approximately 22-20 3 times a minute and resting comfortably. His oxygen saturation at present is 98%.  I considered cardiopulmonary most likely etiology however could not rule out infectious metabolic etiologies.  Patient  now is resting comfortably no longer in and out of bigeminy now with a sinus rhythm.  I have contacted ICU use staff and waiting for admission acceptance.  Patient accepted by Dr. Han    Amount and/or Complexity of Data Reviewed  Labs: ordered. Decision-making details documented in ED Course.  Radiology: ordered. Decision-making details documented in ED Course.    Risk  Prescription drug management.            Scribe Attestation:   Scribe #1: I performed the above scribed service and the documentation accurately describes the services I performed. I attest to the accuracy of the note.        ED Course as of 01/16/25 1441   Thu Jan 16, 2025   1304 ABG is essentially normal on BiPAP. [MI]   1304 CBC auto differential(!) [MI]   1304 Gran # (ANC)(!): 7.8  Left shift [MI]   1305 Comprehensive metabolic panel(!) [MI]   1305 Sodium(!): 134 [MI]   1305 Potassium(!): 3.4 [MI]   1305 Glucose(!): 278 [MI]   1305 Calcium(!): 10.6 [MI]   1305 D-dimer of 0.05 [MI]   1305 Chest x-ray shows interstitial lung disease. [MI]   1306 Patient after receiving 2 mg of Ativan doing much better now respiratory rate is in the upper 20s low 30s patient's oxygen saturations are 98% with BiPAP and patient is tolerating the BiPAP much better now. [MI]   1307 I have consulted Pulmonary and they will evaluate the patient here in the emergency department.  I also spoke with Dr. Kristian han who is the ICU attending and he has evaluated the patient here in the emergency department. [MI]   1325 Only abnormalities on ABG was a PO2 of 101 and a bicarb of 22.7. [MI]      ED Course User Index  [MI] Brandon Tijerina MD                     This document was produced by a scribe under my direction and in my presence. I agree with the content of the note and have made any necessary edits.     Brandon Tijerina MD    01/16/2025 2:41 PM        Clinical Impression:  Final diagnoses:  [Z13.6] Screening for cardiovascular condition  [R06.02] Shortness of  breath  [R06.02] SOB (shortness of breath) (Primary)  [J84.9] Interstitial lung disease  [I49.8] Bigeminy  [R06.82] Tachypnea  [R00.0] Tachycardia          ED Disposition Condition    Admit Stable                Brandon Tijerina MD  01/16/25 1372

## 2025-01-16 NOTE — SUBJECTIVE & OBJECTIVE
"Past Medical History:   Diagnosis Date    CAD (coronary artery disease)     Sees [facility], h/o stent    Diabetes mellitus     Hypertension     Kidney stone     Stroke     age 62       Past Surgical History:   Procedure Laterality Date    24 HOUR IMPEDANCE PH MONITORING OF ESOPHAGUS IN PATIENT NOT TAKING ACID REDUCING MEDICATIONS N/A 11/13/2024    Procedure: IMPEDANCE PH STUDY, ESOPHAGEAL, 24 HOUR, IN PATIENT NOT TAKING ACID REDUCING MEDICATION;  Surgeon: Stephany Mendoza MD;  Location: [facility] (4TH FLR);  Service: Endoscopy;  Laterality: N/A;  referral dr miguel/ off ppi / prep inst sent to pt via mail  11/6- precall attempted no answer. Unable to [facility]-  11/8 - pt not called, per chart review patient currently inpatient at [place] [id number]    CARDIAC CATHETERIZATION      with stent    COLONOSCOPY N/A 03/27/2024    Procedure: COLONOSCOPY;  Surgeon: Ariela Castro MD;  Location: [facility] ENDO;  Service: Endoscopy;  Laterality: N/A;    ESOPHAGEAL MANOMETRY WITH MEASUREMENT OF IMPEDANCE N/A 11/13/2024    Procedure: MANOMETRY, ESOPHAGUS, WITH IMPEDANCE MEASUREMENT;  Surgeon: Stephany Mendoza MD;  Location: [facility] (4TH FLR);  Service: Endoscopy;  Laterality: N/A;    ESOPHAGOGASTRODUODENOSCOPY N/A 03/27/2024    Procedure: EGD (ESOPHAGOGASTRODUODENOSCOPY);  Surgeon: Ariela Castro MD;  Location: [facility] ENDO;  Service: Endoscopy;  Laterality: N/A;    LEFT HEART CATHETERIZATION Left 9/10/2024    Procedure: Left heart cath;  Surgeon: Jemal Drummond MD;  Location: [facility] CATH LAB;  Service: Cardiology;  Laterality: Left;    SHOULDER SURGERY Right        Review of patient's allergies indicates:   Allergen Reactions    Dapagliflozin      Other reaction(s): Other (See Comments)    Pcn [penicillins]     Linagliptin Other (See Comments)     "it knocked me down", "it almost killed me"    Lisinopril Other (See Comments)     cough    Pantoprazole Hives       Family History       Problem Relation (Age of Onset)    Cancer " Mother    Colon cancer Sister          Tobacco Use    Smoking status: Never     Passive exposure: Never    Smokeless tobacco: Never   Substance and Sexual Activity    Alcohol use: No    Drug use: Never    Sexual activity: Not Currently         Review of Systems   Unable to perform ROS: Severe respiratory distress     Objective:     Vital Signs (Most Recent):  Temp: 97.8 °F (36.6 °C) (01/16/25 1315)  Pulse: 108 (01/16/25 1520)  Resp: (!) 27 (01/16/25 1520)  BP: 130/84 (01/16/25 1520)  SpO2: 99 % (01/16/25 1520) Vital Signs (24h Range):  Temp:  [97.6 °F (36.4 °C)-97.8 °F (36.6 °C)] 97.8 °F (36.6 °C)  Pulse:  [108-148] 108  Resp:  [20-58] 27  SpO2:  [91 %-100 %] 99 %  BP: (109-168)/(69-95) 130/84     Weight: 63.5 kg (140 lb)  Body mass index is 21.29 kg/m².      Intake/Output Summary (Last 24 hours) at 1/16/2025 1554  Last data filed at 1/16/2025 1418  Gross per 24 hour   Intake 653.25 ml   Output --   Net 653.25 ml        Physical Exam  Vitals reviewed.   Constitutional:       General: He is in acute distress.      Appearance: He is ill-appearing.   HENT:      Head: Normocephalic and atraumatic.      Comments: Bipap mask with minimal leak  Cardiovascular:      Rate and Rhythm: Regular rhythm. Tachycardia present.      Heart sounds: Normal heart sounds.   Pulmonary:      Effort: Respiratory distress present.      Breath sounds: Rales present. No wheezing.   Abdominal:      General: Abdomen is flat.      Palpations: Abdomen is soft.   Musculoskeletal:      Right lower leg: Edema present.      Left lower leg: Edema present.   Skin:     General: Skin is warm and dry.      Capillary Refill: Capillary refill takes less than 2 seconds.   Neurological:      Mental Status: He is disoriented.      Comments: Sedate          Vents:  Oxygen Concentration (%): 30 (01/16/25 1126)    Lines/Drains/Airways       Peripheral Intravenous Line  Duration                  Peripheral IV - Single Lumen 01/16/25 1059 20 G Left Antecubital <1  day         Peripheral IV - Single Lumen 01/16/25 1104 22 G Right Wrist <1 day                    Significant Labs:    CBC/Anemia Profile:  Recent Labs   Lab 01/16/25  1113   WBC 9.87   HGB 14.4   HCT 43.3      MCV 81*   RDW 12.9        Chemistries:  Recent Labs   Lab 01/16/25  1113   *   K 3.4*   CL 97   CO2 24   BUN 12   CREATININE 1.1   CALCIUM 10.6*   ALBUMIN 3.8   PROT 8.3   BILITOT 0.7   ALKPHOS 79   ALT 14   AST 21       ABGs:   Recent Labs   Lab 01/16/25  1220   PH 7.385   PCO2 37.9   HCO3 22.7*   POCSATURATED 98   BE -2     All pertinent labs within the past 24 hours have been reviewed.    Significant Imaging:   I have reviewed all pertinent imaging results/findings within the past 24 hours.  CXR: I have reviewed all pertinent results/findings within the past 24 hours and my personal findings are:  Persistent ILD, unable to assess for acute flair  Tely shows sinus tachycardia

## 2025-01-16 NOTE — ED NOTES
IVF stopped per verbal order at bedside from Dr. Tijerina. Pt placed on pacer pad, hooked up to zoll for monitoring at this time. RT managing bipap.

## 2025-01-17 PROBLEM — D50.9 IRON DEFICIENCY ANEMIA: Status: ACTIVE | Noted: 2025-01-17

## 2025-01-17 LAB
ADENOVIRUS: NOT DETECTED
ALBUMIN SERPL BCP-MCNC: 3 G/DL (ref 3.5–5.2)
ALP SERPL-CCNC: 58 U/L (ref 40–150)
ALT SERPL W/O P-5'-P-CCNC: 11 U/L (ref 10–44)
ANION GAP SERPL CALC-SCNC: 15 MMOL/L (ref 8–16)
AST SERPL-CCNC: 14 U/L (ref 10–40)
BASOPHILS # BLD AUTO: 0 K/UL (ref 0–0.2)
BASOPHILS NFR BLD: 0 % (ref 0–1.9)
BILIRUB SERPL-MCNC: 0.5 MG/DL (ref 0.1–1)
BORDETELLA PARAPERTUSSIS (IS1001): NOT DETECTED
BORDETELLA PERTUSSIS (PTXP): NOT DETECTED
BUN SERPL-MCNC: 17 MG/DL (ref 8–23)
CALCIUM SERPL-MCNC: 9.4 MG/DL (ref 8.7–10.5)
CHLAMYDIA PNEUMONIAE: NOT DETECTED
CHLORIDE SERPL-SCNC: 101 MMOL/L (ref 95–110)
CO2 SERPL-SCNC: 22 MMOL/L (ref 23–29)
CORONAVIRUS 229E, COMMON COLD VIRUS: NOT DETECTED
CORONAVIRUS HKU1, COMMON COLD VIRUS: NOT DETECTED
CORONAVIRUS NL63, COMMON COLD VIRUS: NOT DETECTED
CORONAVIRUS OC43, COMMON COLD VIRUS: NOT DETECTED
CREAT SERPL-MCNC: 0.8 MG/DL (ref 0.5–1.4)
DIFFERENTIAL METHOD BLD: ABNORMAL
EOSINOPHIL # BLD AUTO: 0 K/UL (ref 0–0.5)
EOSINOPHIL NFR BLD: 0 % (ref 0–8)
ERYTHROCYTE [DISTWIDTH] IN BLOOD BY AUTOMATED COUNT: 12.5 % (ref 11.5–14.5)
EST. GFR  (NO RACE VARIABLE): >60 ML/MIN/1.73 M^2
FLUBV RNA NPH QL NAA+NON-PROBE: NOT DETECTED
GLUCOSE SERPL-MCNC: 280 MG/DL (ref 70–110)
HCT VFR BLD AUTO: 36.7 % (ref 40–54)
HGB BLD-MCNC: 12.3 G/DL (ref 14–18)
HPIV1 RNA NPH QL NAA+NON-PROBE: NOT DETECTED
HPIV2 RNA NPH QL NAA+NON-PROBE: NOT DETECTED
HPIV3 RNA NPH QL NAA+NON-PROBE: NOT DETECTED
HPIV4 RNA NPH QL NAA+NON-PROBE: NOT DETECTED
HUMAN METAPNEUMOVIRUS: NOT DETECTED
IMM GRANULOCYTES # BLD AUTO: 0.02 K/UL (ref 0–0.04)
IMM GRANULOCYTES NFR BLD AUTO: 0.4 % (ref 0–0.5)
INFLUENZA A: NOT DETECTED
LYMPHOCYTES # BLD AUTO: 0.7 K/UL (ref 1–4.8)
LYMPHOCYTES NFR BLD: 12.9 % (ref 18–48)
MAGNESIUM SERPL-MCNC: 1.9 MG/DL (ref 1.6–2.6)
MCH RBC QN AUTO: 27 PG (ref 27–31)
MCHC RBC AUTO-ENTMCNC: 33.5 G/DL (ref 32–36)
MCV RBC AUTO: 81 FL (ref 82–98)
MONOCYTES # BLD AUTO: 0.2 K/UL (ref 0.3–1)
MONOCYTES NFR BLD: 3.2 % (ref 4–15)
MYCOPLASMA PNEUMONIAE: NOT DETECTED
NEUTROPHILS # BLD AUTO: 4.4 K/UL (ref 1.8–7.7)
NEUTROPHILS NFR BLD: 83.5 % (ref 38–73)
NRBC BLD-RTO: 0 /100 WBC
OHS QRS DURATION: 86 MS
OHS QRS DURATION: 90 MS
OHS QTC CALCULATION: 401 MS
OHS QTC CALCULATION: 416 MS
PHOSPHATE SERPL-MCNC: 3.3 MG/DL (ref 2.7–4.5)
PLATELET # BLD AUTO: 302 K/UL (ref 150–450)
PMV BLD AUTO: 10 FL (ref 9.2–12.9)
POCT GLUCOSE: 266 MG/DL (ref 70–110)
POCT GLUCOSE: 320 MG/DL (ref 70–110)
POCT GLUCOSE: 326 MG/DL (ref 70–110)
POCT GLUCOSE: 333 MG/DL (ref 70–110)
POTASSIUM SERPL-SCNC: 3.7 MMOL/L (ref 3.5–5.1)
PROT SERPL-MCNC: 6.5 G/DL (ref 6–8.4)
RBC # BLD AUTO: 4.55 M/UL (ref 4.6–6.2)
RESPIRATORY INFECTION PANEL SOURCE: NORMAL
RSV RNA NPH QL NAA+NON-PROBE: NOT DETECTED
RV+EV RNA NPH QL NAA+NON-PROBE: NOT DETECTED
SARS-COV-2 RNA RESP QL NAA+PROBE: NOT DETECTED
SODIUM SERPL-SCNC: 138 MMOL/L (ref 136–145)
WBC # BLD AUTO: 5.28 K/UL (ref 3.9–12.7)

## 2025-01-17 PROCEDURE — 85025 COMPLETE CBC W/AUTO DIFF WBC: CPT | Mod: HCNC | Performed by: STUDENT IN AN ORGANIZED HEALTH CARE EDUCATION/TRAINING PROGRAM

## 2025-01-17 PROCEDURE — 25000003 PHARM REV CODE 250: Mod: HCNC | Performed by: STUDENT IN AN ORGANIZED HEALTH CARE EDUCATION/TRAINING PROGRAM

## 2025-01-17 PROCEDURE — 80053 COMPREHEN METABOLIC PANEL: CPT | Mod: HCNC | Performed by: STUDENT IN AN ORGANIZED HEALTH CARE EDUCATION/TRAINING PROGRAM

## 2025-01-17 PROCEDURE — 36415 COLL VENOUS BLD VENIPUNCTURE: CPT | Mod: HCNC | Performed by: STUDENT IN AN ORGANIZED HEALTH CARE EDUCATION/TRAINING PROGRAM

## 2025-01-17 PROCEDURE — 99233 SBSQ HOSP IP/OBS HIGH 50: CPT | Mod: GC,,, | Performed by: INTERNAL MEDICINE

## 2025-01-17 PROCEDURE — 99900035 HC TECH TIME PER 15 MIN (STAT): Mod: HCNC

## 2025-01-17 PROCEDURE — 11000001 HC ACUTE MED/SURG PRIVATE ROOM: Mod: HCNC

## 2025-01-17 PROCEDURE — 25000003 PHARM REV CODE 250: Mod: HCNC | Performed by: CLINIC/CENTER

## 2025-01-17 PROCEDURE — 83735 ASSAY OF MAGNESIUM: CPT | Mod: HCNC | Performed by: STUDENT IN AN ORGANIZED HEALTH CARE EDUCATION/TRAINING PROGRAM

## 2025-01-17 PROCEDURE — 94640 AIRWAY INHALATION TREATMENT: CPT | Mod: HCNC

## 2025-01-17 PROCEDURE — 63600175 PHARM REV CODE 636 W HCPCS: Mod: JZ,TB,HCNC | Performed by: INTERNAL MEDICINE

## 2025-01-17 PROCEDURE — 27000221 HC OXYGEN, UP TO 24 HOURS: Mod: HCNC

## 2025-01-17 PROCEDURE — 84100 ASSAY OF PHOSPHORUS: CPT | Mod: HCNC | Performed by: STUDENT IN AN ORGANIZED HEALTH CARE EDUCATION/TRAINING PROGRAM

## 2025-01-17 PROCEDURE — 94761 N-INVAS EAR/PLS OXIMETRY MLT: CPT | Mod: HCNC

## 2025-01-17 PROCEDURE — 25000242 PHARM REV CODE 250 ALT 637 W/ HCPCS: Mod: HCNC | Performed by: STUDENT IN AN ORGANIZED HEALTH CARE EDUCATION/TRAINING PROGRAM

## 2025-01-17 PROCEDURE — 63600175 PHARM REV CODE 636 W HCPCS: Mod: HCNC | Performed by: STUDENT IN AN ORGANIZED HEALTH CARE EDUCATION/TRAINING PROGRAM

## 2025-01-17 RX ORDER — INSULIN ASPART 100 [IU]/ML
2 INJECTION, SOLUTION INTRAVENOUS; SUBCUTANEOUS
Status: DISCONTINUED | OUTPATIENT
Start: 2025-01-17 | End: 2025-01-19

## 2025-01-17 RX ORDER — IBUPROFEN 200 MG
16 TABLET ORAL
Status: DISCONTINUED | OUTPATIENT
Start: 2025-01-17 | End: 2025-01-20 | Stop reason: HOSPADM

## 2025-01-17 RX ORDER — INSULIN GLARGINE 100 [IU]/ML
12 INJECTION, SOLUTION SUBCUTANEOUS DAILY
Status: DISCONTINUED | OUTPATIENT
Start: 2025-01-17 | End: 2025-01-19

## 2025-01-17 RX ORDER — GLUCAGON 1 MG
1 KIT INJECTION
Status: DISCONTINUED | OUTPATIENT
Start: 2025-01-17 | End: 2025-01-20 | Stop reason: HOSPADM

## 2025-01-17 RX ORDER — DEXMEDETOMIDINE HYDROCHLORIDE 4 UG/ML
0-1.4 INJECTION, SOLUTION INTRAVENOUS CONTINUOUS
Status: DISCONTINUED | OUTPATIENT
Start: 2025-01-17 | End: 2025-01-17 | Stop reason: HOSPADM

## 2025-01-17 RX ORDER — IBUPROFEN 200 MG
24 TABLET ORAL
Status: DISCONTINUED | OUTPATIENT
Start: 2025-01-17 | End: 2025-01-20 | Stop reason: HOSPADM

## 2025-01-17 RX ORDER — INSULIN ASPART 100 [IU]/ML
0-5 INJECTION, SOLUTION INTRAVENOUS; SUBCUTANEOUS
Status: DISCONTINUED | OUTPATIENT
Start: 2025-01-17 | End: 2025-01-20 | Stop reason: HOSPADM

## 2025-01-17 RX ADMIN — INSULIN ASPART 2 UNITS: 100 INJECTION, SOLUTION INTRAVENOUS; SUBCUTANEOUS at 12:01

## 2025-01-17 RX ADMIN — METHYLPREDNISOLONE SODIUM SUCCINATE 60 MG: 125 INJECTION, POWDER, FOR SOLUTION INTRAMUSCULAR; INTRAVENOUS at 05:01

## 2025-01-17 RX ADMIN — INSULIN ASPART 4 UNITS: 100 INJECTION, SOLUTION INTRAVENOUS; SUBCUTANEOUS at 11:01

## 2025-01-17 RX ADMIN — SERTRALINE HYDROCHLORIDE 25 MG: 25 TABLET ORAL at 08:01

## 2025-01-17 RX ADMIN — INSULIN ASPART 2 UNITS: 100 INJECTION, SOLUTION INTRAVENOUS; SUBCUTANEOUS at 04:01

## 2025-01-17 RX ADMIN — FAMOTIDINE 20 MG: 10 INJECTION, SOLUTION INTRAVENOUS at 10:01

## 2025-01-17 RX ADMIN — INSULIN ASPART 3 UNITS: 100 INJECTION, SOLUTION INTRAVENOUS; SUBCUTANEOUS at 05:01

## 2025-01-17 RX ADMIN — GUAIFENESIN AND DEXTROMETHORPHAN HYDROBROMIDE 1 TABLET: 600; 30 TABLET, EXTENDED RELEASE ORAL at 08:01

## 2025-01-17 RX ADMIN — SIMETHICONE 80 MG: 80 TABLET, CHEWABLE ORAL at 04:01

## 2025-01-17 RX ADMIN — ASPIRIN 81 MG CHEWABLE TABLET 81 MG: 81 TABLET CHEWABLE at 08:01

## 2025-01-17 RX ADMIN — INSULIN GLARGINE 12 UNITS: 100 INJECTION, SOLUTION SUBCUTANEOUS at 08:01

## 2025-01-17 RX ADMIN — Medication 6 MG: at 10:01

## 2025-01-17 RX ADMIN — INSULIN ASPART 2 UNITS: 100 INJECTION, SOLUTION INTRAVENOUS; SUBCUTANEOUS at 10:01

## 2025-01-17 RX ADMIN — INSULIN ASPART 4 UNITS: 100 INJECTION, SOLUTION INTRAVENOUS; SUBCUTANEOUS at 04:01

## 2025-01-17 RX ADMIN — INSULIN ASPART 1 UNITS: 100 INJECTION, SOLUTION INTRAVENOUS; SUBCUTANEOUS at 10:01

## 2025-01-17 RX ADMIN — INSULIN ASPART 2 UNITS: 100 INJECTION, SOLUTION INTRAVENOUS; SUBCUTANEOUS at 08:01

## 2025-01-17 RX ADMIN — GUAIFENESIN AND DEXTROMETHORPHAN HYDROBROMIDE 1 TABLET: 600; 30 TABLET, EXTENDED RELEASE ORAL at 10:01

## 2025-01-17 RX ADMIN — LATANOPROST 1 DROP: 50 SOLUTION OPHTHALMIC at 10:01

## 2025-01-17 RX ADMIN — IPRATROPIUM BROMIDE AND ALBUTEROL SULFATE 3 ML: 2.5; .5 SOLUTION RESPIRATORY (INHALATION) at 12:01

## 2025-01-17 RX ADMIN — METOPROLOL SUCCINATE 25 MG: 25 TABLET, EXTENDED RELEASE ORAL at 08:01

## 2025-01-17 RX ADMIN — METHYLPREDNISOLONE SODIUM SUCCINATE 60 MG: 125 INJECTION, POWDER, FOR SOLUTION INTRAMUSCULAR; INTRAVENOUS at 11:01

## 2025-01-17 RX ADMIN — FAMOTIDINE 20 MG: 10 INJECTION, SOLUTION INTRAVENOUS at 08:01

## 2025-01-17 RX ADMIN — POTASSIUM BICARBONATE 50 MEQ: 977.5 TABLET, EFFERVESCENT ORAL at 06:01

## 2025-01-17 NOTE — SUBJECTIVE & OBJECTIVE
Interval History: Pt has been weaned down to NC 3L, satting mid to high 90s. Pt reports some continued cough and SOB but much improved from yesterday. He mentions that he is not ready to accept that his illness may be irreversible and that his long term prognosis is poor with all his recent hospitalizations for respiratory issues.    Objective:     Vital Signs (Most Recent):  Temp: 97.2 °F (36.2 °C) (01/16/25 2300)  Pulse: 107 (01/17/25 0700)  Resp: (!) 33 (01/17/25 0700)  BP: (!) 141/84 (01/17/25 0700)  SpO2: 100 % (01/17/25 0700) Vital Signs (24h Range):  Temp:  [97.2 °F (36.2 °C)-97.8 °F (36.6 °C)] 97.2 °F (36.2 °C)  Pulse:  [] 107  Resp:  [19-58] 33  SpO2:  [91 %-100 %] 100 %  BP: (109-168)/() 141/84     Weight: 59.1 kg (130 lb 4.7 oz)  Body mass index is 19.81 kg/m².      Intake/Output Summary (Last 24 hours) at 1/17/2025 0837  Last data filed at 1/17/2025 0605  Gross per 24 hour   Intake 893.25 ml   Output 400 ml   Net 493.25 ml        Physical Exam  Vitals and nursing note reviewed.   Constitutional:       Appearance: He is ill-appearing.      Comments: Thin appearing male.   HENT:      Head: Normocephalic.      Nose: Nose normal.      Mouth/Throat:      Mouth: Mucous membranes are moist.   Eyes:      Extraocular Movements: Extraocular movements intact.   Cardiovascular:      Rate and Rhythm: Normal rate and regular rhythm.      Pulses: Normal pulses.      Heart sounds: Normal heart sounds.   Pulmonary:      Breath sounds: Rales present.      Comments: Increased WOB with prolonged conversation.  Decreased air movement bilaterally.  Abdominal:      General: Abdomen is flat. Bowel sounds are normal.      Palpations: Abdomen is soft.   Musculoskeletal:         General: Normal range of motion.      Cervical back: Normal range of motion.      Right lower leg: No edema.      Left lower leg: No edema.   Skin:     General: Skin is warm.   Neurological:      Mental Status: He is alert.           Review of  Systems   Constitutional:  Negative for fever.   HENT:  Negative for congestion and rhinorrhea.    Eyes:  Negative for visual disturbance.   Respiratory:  Positive for cough, shortness of breath and wheezing.    Cardiovascular:  Negative for chest pain.   Gastrointestinal:  Negative for nausea and vomiting.   Genitourinary:  Negative for dysuria.   Musculoskeletal:  Negative for arthralgias.   Skin:  Negative for rash.   Neurological:  Negative for headaches.       Vents:  Oxygen Concentration (%): 30 (01/16/25 2138)    Lines/Drains/Airways       Peripheral Intravenous Line  Duration                  Peripheral IV - Single Lumen 01/16/25 1059 20 G Left Antecubital <1 day         Peripheral IV - Single Lumen 01/16/25 1104 22 G Right Wrist <1 day                    Significant Labs:    CBC/Anemia Profile:  Recent Labs   Lab 01/16/25  1113 01/17/25  0414   WBC 9.87 5.28   HGB 14.4 12.3*   HCT 43.3 36.7*    302   MCV 81* 81*   RDW 12.9 12.5   IRON 32*  --    FERRITIN 230  --         Chemistries:  Recent Labs   Lab 01/16/25  1113 01/17/25  0414   * 138   K 3.4* 3.7   CL 97 101   CO2 24 22*   BUN 12 17   CREATININE 1.1 0.8   CALCIUM 10.6* 9.4   ALBUMIN 3.8 3.0*   PROT 8.3 6.5   BILITOT 0.7 0.5   ALKPHOS 79 58   ALT 14 11   AST 21 14   MG  --  1.9   PHOS  --  3.3       All pertinent labs within the past 24 hours have been reviewed.    Significant Imaging:  I have reviewed all pertinent imaging results/findings within the past 24 hours.  CT: I have reviewed all pertinent results/findings within the past 24 hours and my personal findings are:  no PE, extensive honeycombing ILD  CXR: I have reviewed all pertinent results/findings within the past 24 hours and my personal findings are:  ILD, no interval changes

## 2025-01-17 NOTE — ASSESSMENT & PLAN NOTE
Last echo 10/2024 EF 55%, G1DD, regional wall motion abnormality present.  -BNP wnl on admission  -monitor for evidence of volume overload, CXR and CTA not suggestive as cause of respiratory failure at this time

## 2025-01-17 NOTE — HOSPITAL COURSE
74 y.o. male who  has a past medical history of BPH, CAD, Chronic HFrEF, Hypertension, Kidney stone, Stroke, and Type 2 diabetes mellitus without complication, without long-term current use of insulin, presented to the ED with CC of SOB.  Patient has been SOB since 4am this morning and progressively worse. Did have URI symptoms for past 2-3 days. With intermittent fever/chills and cough. He is on home O2 at 2L,, but had to increase his home O2. In the ED, patient was decompensating and was nearly intubated. Given ativan on Bipap and was able to avoid intubation. Pulm consulted. Admitted to ICU. Maintaining his pH and labs unremarkable. COVID/FLU/RSV negative. BNP, trop, LA and procal all negative.  Patient was initially in the ICU before being transferred to telemetry On 01/17.  Etiology of this condition was attributable to exacerbation of interstitial lung disease.  CellCept was initially held later on restarted.  CTA obtain showed no evidence of PE but consistent with history of ILD.  Patient was started on Solu-Medrol which was progressively weaned to prednisone 60 daily with plan for slow outpatient taper.  Lipitor was also changed to Crestor given concern for ILD.  Patient was discharged with close follow up by pulmonology for PFTs and for consideration of Ofev.

## 2025-01-17 NOTE — PROGRESS NOTES
[facility] Intensive Care  Pulmonology  Progress Note    Patient Name: Emmanuel Grant  MRN: [id number]  Admission Date: 1/16/2025  Hospital Length of Stay: 1 days  Code Status: Full Code  Attending Provider: Kristian Han MD  Primary Care Provider: Wilfredo De Souza MD   Principal Problem: Interstitial lung disease    Subjective:     Interval History: Pt has been weaned down to NC 3L, satting mid to high 90s. Pt reports some continued cough and SOB but much improved from yesterday. He mentions that he is not ready to accept that his illness may be irreversible and that his long term prognosis is poor with all his recent hospitalizations for respiratory issues.    Objective:     Vital Signs (Most Recent):  Temp: 97.2 °F (36.2 °C) (01/16/25 2300)  Pulse: 107 (01/17/25 0700)  Resp: (!) 33 (01/17/25 0700)  BP: (!) 141/84 (01/17/25 0700)  SpO2: 100 % (01/17/25 0700) Vital Signs (24h Range):  Temp:  [97.2 °F (36.2 °C)-97.8 °F (36.6 °C)] 97.2 °F (36.2 °C)  Pulse:  [] 107  Resp:  [19-58] 33  SpO2:  [91 %-100 %] 100 %  BP: (109-168)/() 141/84     Weight: 59.1 kg (130 lb 4.7 oz)  Body mass index is 19.81 kg/m².      Intake/Output Summary (Last 24 hours) at 1/17/2025 0837  Last data filed at 1/17/2025 0605  Gross per 24 hour   Intake 893.25 ml   Output 400 ml   Net 493.25 ml        Physical Exam  Vitals and nursing note reviewed.   Constitutional:       Appearance: He is ill-appearing.      Comments: Thin appearing male.   HENT:      Head: Normocephalic.      Nose: Nose normal.      Mouth/Throat:      Mouth: Mucous membranes are moist.   Eyes:      Extraocular Movements: Extraocular movements intact.   Cardiovascular:      Rate and Rhythm: Normal rate and regular rhythm.      Pulses: Normal pulses.      Heart sounds: Normal heart sounds.   Pulmonary:      Breath sounds: Rales present.      Comments: Increased WOB with prolonged conversation.  Decreased air movement bilaterally.  Abdominal:      General: Abdomen is  flat. Bowel sounds are normal.      Palpations: Abdomen is soft.   Musculoskeletal:         General: Normal range of motion.      Cervical back: Normal range of motion.      Right lower leg: No edema.      Left lower leg: No edema.   Skin:     General: Skin is warm.   Neurological:      Mental Status: He is alert.           Review of Systems   Constitutional:  Negative for fever.   HENT:  Negative for congestion and rhinorrhea.    Eyes:  Negative for visual disturbance.   Respiratory:  Positive for cough, shortness of breath and wheezing.    Cardiovascular:  Negative for chest pain.   Gastrointestinal:  Negative for nausea and vomiting.   Genitourinary:  Negative for dysuria.   Musculoskeletal:  Negative for arthralgias.   Skin:  Negative for rash.   Neurological:  Negative for headaches.       Vents:  Oxygen Concentration (%): 30 (01/16/25 2138)    Lines/Drains/Airways       Peripheral Intravenous Line  Duration                  Peripheral IV - Single Lumen 01/16/25 1059 20 G Left Antecubital <1 day         Peripheral IV - Single Lumen 01/16/25 1104 22 G Right Wrist <1 day                    Significant Labs:    CBC/Anemia Profile:  Recent Labs   Lab 01/16/25  1113 01/17/25  0414   WBC 9.87 5.28   HGB 14.4 12.3*   HCT 43.3 36.7*    302   MCV 81* 81*   RDW 12.9 12.5   IRON 32*  --    FERRITIN 230  --         Chemistries:  Recent Labs   Lab 01/16/25  1113 01/17/25  0414   * 138   K 3.4* 3.7   CL 97 101   CO2 24 22*   BUN 12 17   CREATININE 1.1 0.8   CALCIUM 10.6* 9.4   ALBUMIN 3.8 3.0*   PROT 8.3 6.5   BILITOT 0.7 0.5   ALKPHOS 79 58   ALT 14 11   AST 21 14   MG  --  1.9   PHOS  --  3.3       All pertinent labs within the past 24 hours have been reviewed.    Significant Imaging:  I have reviewed all pertinent imaging results/findings within the past 24 hours.  CT: I have reviewed all pertinent results/findings within the past 24 hours and my personal findings are:  no PE, extensive honeycombing ILD  CXR:  I have reviewed all pertinent results/findings within the past 24 hours and my personal findings are:  ILD, no interval changes    ABG  Recent Labs   Lab 01/16/25  1220   PH 7.385   PO2 101*   PCO2 37.9   HCO3 22.7*   BE -2     Assessment/Plan:     Pulmonary  * Interstitial lung disease exacerbation  Treat with steroids as has been responsive in the past. Assess for acute infections with resp panel. Hold CellCept.  CTA obtained without evidence of PE but consistent with hx of ILD. Has had some w/u for this with CORAZON level in the past.  -needs outpatient PFTs and consideration for Ofev  -likely needs palliative care with recurrent admissions  -hold lipitor as can be associated with ILD, send home on Crestor as much lower association with ILD  -continue solumedrol 60 q 6hrs, will start weaning AM if pt continues with clinical improvement  -duonebs q 4hrs PRN    Acute hypoxic respiratory failure  Patient with Hypoxic Respiratory failure which is Acute on chronic.  he is on home oxygen at 2 LPM. Supplemental oxygen was provided and noted- Oxygen Concentration (%):  [30] 30    Signs/symptoms of respiratory failure include- increased work of breathing and respiratory distress. Contributing diagnoses includes - Interstitial lung disease Labs and images were reviewed. Patient Has recent ABG, which has been reviewed. Will treat underlying causes and adjust management of respiratory failure as follows.  -treat for ILD exacerbation. Initially required BIPAP but now weaned down to 3L NC which is near his home requirement  -abysmal prognosis if intubated and will address code status when patient able.  -flu/COVID/RSV negative, will follow up resp panel    Cardiac/Vascular  Chronic HFrEF (heart failure with reduced ejection fraction)  Last echo 10/2024 EF 55%, G1DD, regional wall motion abnormality present.  -BNP wnl on admission  -monitor for evidence of volume overload, CXR and CTA not suggestive as cause of respiratory failure  at this time    Coronary artery disease involving native coronary artery of native heart with angina pectoris  Noted. Will need to switch his statin as outpatient  -troponin and BNP wnl on admission    Immunology/Multi System  Immunosuppression due to drug therapy  -hold CellCept. Need to rule out infectious causes of presentation. Hold on empiric antibiotics. PJP less likely on CellCept    Oncology  Iron deficiency anemia  Anemia is likely due to  Fe deficiency . Most recent hemoglobin and hematocrit are listed below.  Recent Labs     01/16/25  1113 01/17/25  0414   HGB 14.4 12.3*   HCT 43.3 36.7*   -monitor serial CBC: Daily  -transfuse PRBC if patient becomes hemodynamically unstable, symptomatic or H/H drops below 7/21.  -%Fe sat 12%, will consider outpt replacement to help with oxygenation as well    Endocrine  Type 2 diabetes mellitus without complication, without long-term current use of insulin  -continue lantus 12 U and aspart 2 U TID with meals  -glucose checks ACHS  -monitor glucose closely shayna in setting of steroid use for ILD exacerbation    Elizabeth Bradley MD  PGY-3  [facility]

## 2025-01-17 NOTE — ASSESSMENT & PLAN NOTE
Treat with steroids as has been responsive in the past. Assess for acute infections with resp panel. Hold CellCept.  CTA obtained without evidence of PE but consistent with hx of ILD. Has had some w/u for this with CORAZON level in the past.  -needs outpatient PFTs and consideration for Ofev  -likely needs palliative care with recurrent admissions  -hold lipitor as can be associated with ILD, send home on Crestor as much lower association with ILD  -continue solumedrol 60 q 6hrs, will start weaning AM if pt continues with clinical improvement  -duonebs q 4hrs PRN

## 2025-01-17 NOTE — ASSESSMENT & PLAN NOTE
Patient with Hypoxic Respiratory failure which is Acute on chronic.  he is on home oxygen at 2 LPM. Supplemental oxygen was provided and noted- Oxygen Concentration (%):  [30] 30    Signs/symptoms of respiratory failure include- increased work of breathing and respiratory distress. Contributing diagnoses includes - Interstitial lung disease Labs and images were reviewed. Patient Has recent ABG, which has been reviewed. Will treat underlying causes and adjust management of respiratory failure as follows.  -treat for ILD exacerbation. Initially required BIPAP but now weaned down to 3L NC which is near his home requirement  -abysmal prognosis if intubated and will address code status when patient able.  -flu/COVID/RSV negative, will follow up resp panel

## 2025-01-17 NOTE — H&P
[facility] Medicine  History & Physical    Patient Name: Emmanuel Grant  MRN: [id number]  Patient Class: IP- Inpatient  Admission Date: 1/16/2025  Attending Physician: Kristian Han MD   Primary Care Provider: Wilfredo De Souza MD         Patient information was obtained from patient, past medical records, and ER records.     Subjective:     Principal Problem:Interstitial lung disease    Chief Complaint:   Chief Complaint   Patient presents with    Shortness of Breath     Pt c/o SOB starting at 0400 this morning. Moderate distress noted at present. Currently on 3lpm NC. Pt denies COPD or CHF. RR labored at present. Struggling to complete sentences         HPI:   Emmanuel Grant is a 74 y.o. male who  has a past medical history of BPH, CAD, Chronic HFrEF, Hypertension, Kidney stone, Stroke, and Type 2 diabetes mellitus without complication, without long-term current use of insulin, presented to the ED with CC of SOB.     Patient has been SOB since 4am this morning and progressively worse. Did have URI symptoms for past 2-3 days. With intermittent fever/chills and cough. He is on home O2 at 2L,, but had to increase his home O2. In the ED, patient was decompensating and was nearly intubated. Given ativan on Bipap and was able to avoid intubation. Pulm consulted. Admitted to ICU. Maintaining his pH and labs unremarkable. COVID/FLU/RSV negative. BNP, trop, LA and procal all negative. HPI limited as patient is quite ill/tired on Bipap.     Past Medical History:   Diagnosis Date    CAD (coronary artery disease)     Sees [facility], h/o stent    Diabetes mellitus     Hypertension     Kidney stone     Stroke     age 62       Past Surgical History:   Procedure Laterality Date    24 HOUR IMPEDANCE PH MONITORING OF ESOPHAGUS IN PATIENT NOT TAKING ACID REDUCING MEDICATIONS N/A 11/13/2024    Procedure: IMPEDANCE PH STUDY, ESOPHAGEAL, 24 HOUR, IN PATIENT NOT TAKING ACID REDUCING MEDICATION;  Surgeon: Kelly  "Stephany WING MD;  Location: [facility]ON[facility] (4TH FLR);  Service: Endoscopy;  Laterality: N/A;  referral dr miguel/ off ppi / prep inst sent to pt via mail  11/6- precall attempted no answer. Unable to [facility]-  11/8 - pt not called, per chart review patient currently inpatient at [place] [id number]    CARDIAC CATHETERIZATION      with stent    COLONOSCOPY N/A 03/27/2024    Procedure: COLONOSCOPY;  Surgeon: Ariela Castro MD;  Location: [facility];  Service: Endoscopy;  Laterality: N/A;    ESOPHAGEAL MANOMETRY WITH MEASUREMENT OF IMPEDANCE N/A 11/13/2024    Procedure: MANOMETRY, ESOPHAGUS, WITH IMPEDANCE MEASUREMENT;  Surgeon: Stephany Mendoza MD;  Location: [facility] ENDO (4TH FLR);  Service: Endoscopy;  Laterality: N/A;    ESOPHAGOGASTRODUODENOSCOPY N/A 03/27/2024    Procedure: EGD (ESOPHAGOGASTRODUODENOSCOPY);  Surgeon: Ariela Castro MD;  Location: [facility] ENDO;  Service: Endoscopy;  Laterality: N/A;    LEFT HEART CATHETERIZATION Left 9/10/2024    Procedure: Left heart cath;  Surgeon: Jemal Drummond MD;  Location: [facility] CATH LAB;  Service: Cardiology;  Laterality: Left;    SHOULDER SURGERY Right        Review of patient's allergies indicates:   Allergen Reactions    Dapagliflozin      Other reaction(s): Other (See Comments)    Pcn [penicillins]     Linagliptin Other (See Comments)     "it knocked me down", "it almost killed me"    Lisinopril Other (See Comments)     cough    Pantoprazole Hives       No current facility-administered medications on file prior to encounter.     Current Outpatient Medications on File Prior to Encounter   Medication Sig    albuterol-ipratropium (DUO-NEB) 2.5 mg-0.5 mg/3 mL nebulizer solution Take 3 mLs by nebulization every 6 (six) hours as needed for Wheezing. Rescue    amLODIPine (NORVASC) 10 MG tablet Take 1 tablet (10 mg total) by mouth once daily.    arformoteroL (BROVANA) 15 mcg/2 mL Nebu Take 2 mLs (15 mcg total) by nebulization 2 (two) times daily. Controller    aspirin 81 MG " "Chew Take 81 mg by mouth once daily.    atorvastatin (LIPITOR) 40 MG tablet Take 1 tablet (40 mg total) by mouth every evening.    blood-glucose sensor (FREESTYLE RAMBO 3 PLUS SENSOR) Blanche Change every 15 days    budesonide (PULMICORT) 0.5 mg/2 mL nebulizer solution Take 4 mLs (1 mg total) by nebulization 2 (two) times daily. Controller    carvediloL (COREG) 25 MG tablet Take 25 mg by mouth 2 (two) times daily with meals.    cholecalciferol, vitamin D3, (VITAMIN D3) 50 mcg (2,000 unit) Cap capsule Take 1 capsule by mouth once daily.    furosemide (LASIX) 20 MG tablet Take 1 tablet (20 mg total) by mouth once daily.    glimepiride (AMARYL) 2 MG tablet Take 1 tablet (2 mg total) by mouth 2 (two) times a day. Take with meals  Resume taking after completion of steroid taper    insulin glargine U-100, Lantus, (LANTUS SOLOSTAR U-100 INSULIN) 100 unit/mL (3 mL) InPn pen Inject 12 Units into the skin once daily.    latanoprost, PF, 0.005 % Drop 1 drop into affected eye in the evening Ophthalmic Once a day    metFORMIN (GLUCOPHAGE) 1000 MG tablet Take 1 tablet (1,000 mg total) by mouth 2 (two) times daily with meals.    metoprolol succinate (TOPROL-XL) 50 MG 24 hr tablet Take 1 tablet (50 mg total) by mouth once daily.    mirabegron (MYRBETRIQ) 25 mg Tb24 ER tablet Take 1 tablet (25 mg total) by mouth once daily.    mycophenolate (CELLCEPT) 250 mg Cap Take 4 capsules (1,000 mg total) by mouth 2 (two) times daily.    nitroGLYCERIN (NITROSTAT) 0.3 MG SL tablet Place 1 tablet (0.3 mg total) under the tongue every 5 (five) minutes as needed for Chest pain.    omeprazole (PRILOSEC) 40 MG capsule Take 40 mg by mouth once daily.    ONETOUCH DELICA PLUS LANCET 33 gauge Misc Apply topically 3 (three) times daily.    pen needle, diabetic 32 gauge x 5/32" Ndle 1 each by Misc.(Non-Drug; Combo Route) route once daily.    sacubitriL-valsartan (ENTRESTO) 24-26 mg per tablet Take 1 tablet by mouth 2 (two) times daily.    sertraline (ZOLOFT) " 25 MG tablet Take 1 tablet (25 mg total) by mouth once daily.    sucralfate (CARAFATE) 1 gram tablet Take 1 tablet (1 g total) by mouth 4 (four) times daily before meals and nightly.     Family History       Problem Relation (Age of Onset)    Cancer Mother    Colon cancer Sister          Tobacco Use    Smoking status: Never     Passive exposure: Never    Smokeless tobacco: Never   Substance and Sexual Activity    Alcohol use: No    Drug use: Never    Sexual activity: Not Currently     Review of Systems   Constitutional:  Positive for activity change and fatigue. Negative for fever and unexpected weight change.   HENT:  Negative for trouble swallowing and voice change.    Eyes:  Negative for photophobia and visual disturbance.   Respiratory:  Positive for cough, chest tightness, shortness of breath and wheezing.    Cardiovascular:  Negative for chest pain and leg swelling.   Gastrointestinal:  Positive for nausea. Negative for blood in stool.   Endocrine: Negative for polydipsia and polyuria.   Genitourinary:  Negative for difficulty urinating and hematuria.   Musculoskeletal:  Negative for neck pain and neck stiffness.   Skin:  Negative for pallor and rash.   Allergic/Immunologic: Negative for food allergies and immunocompromised state.   Neurological:  Negative for seizures and syncope.   Psychiatric/Behavioral:  Negative for agitation, behavioral problems and confusion.      Objective:     Vital Signs (Most Recent):  Temp: 97.7 °F (36.5 °C) (01/16/25 1628)  Pulse: (!) 114 (01/16/25 1700)  Resp: (!) 27 (01/16/25 1700)  BP: 127/84 (01/16/25 1700)  SpO2: 99 % (01/16/25 1700) Vital Signs (24h Range):  Temp:  [97.6 °F (36.4 °C)-97.8 °F (36.6 °C)] 97.7 °F (36.5 °C)  Pulse:  [108-148] 114  Resp:  [20-58] 27  SpO2:  [91 %-100 %] 99 %  BP: (109-168)/() 127/84     Weight: 59.1 kg (130 lb 4.7 oz)  Body mass index is 19.81 kg/m².     Physical Exam  Vitals and nursing note reviewed.   Constitutional:       General: He is  not in acute distress.     Appearance: He is well-developed. He is ill-appearing. He is not diaphoretic.   HENT:      Head: Normocephalic and atraumatic.   Eyes:      General: No scleral icterus.  Neck:      Thyroid: No thyromegaly.   Cardiovascular:      Rate and Rhythm: Normal rate and regular rhythm.      Heart sounds: No murmur heard.  Pulmonary:      Breath sounds: No stridor. Wheezing and rhonchi present.   Abdominal:      General: There is no distension.      Palpations: Abdomen is soft.      Tenderness: There is no guarding.   Musculoskeletal:         General: No swelling or deformity. Normal range of motion.      Cervical back: Normal range of motion.   Skin:     General: Skin is warm.      Capillary Refill: Capillary refill takes less than 2 seconds.      Coloration: Skin is not jaundiced.      Findings: No bruising.   Neurological:      Mental Status: He is alert and oriented to person, place, and time. Mental status is at baseline.      Cranial Nerves: No cranial nerve deficit.      Motor: No weakness.   Psychiatric:         Mood and Affect: Mood normal.         Behavior: Behavior normal.                    Recent Results (from the past 24 hours)   CBC auto differential    Collection Time: 01/16/25 11:13 AM   Result Value Ref Range    WBC 9.87 3.90 - 12.70 K/uL    RBC 5.33 4.60 - 6.20 M/uL    Hemoglobin 14.4 14.0 - 18.0 g/dL    Hematocrit 43.3 40.0 - 54.0 %    MCV 81 (L) 82 - 98 fL    MCH 27.0 27.0 - 31.0 pg    MCHC 33.3 32.0 - 36.0 g/dL    RDW 12.9 11.5 - 14.5 %    Platelets 382 150 - 450 K/uL    MPV 9.5 9.2 - 12.9 fL    Immature Granulocytes 0.3 0.0 - 0.5 %    Gran # (ANC) 7.8 (H) 1.8 - 7.7 K/uL    Immature Grans (Abs) 0.03 0.00 - 0.04 K/uL    Lymph # 1.0 1.0 - 4.8 K/uL    Mono # 0.7 0.3 - 1.0 K/uL    Eos # 0.3 0.0 - 0.5 K/uL    Baso # 0.02 0.00 - 0.20 K/uL    nRBC 0 0 /100 WBC    Gran % 79.3 (H) 38.0 - 73.0 %    Lymph % 10.2 (L) 18.0 - 48.0 %    Mono % 7.2 4.0 - 15.0 %    Eosinophil % 2.8 0.0 - 8.0 %     Basophil % 0.2 0.0 - 1.9 %    Differential Method Automated    Comprehensive metabolic panel    Collection Time: 01/16/25 11:13 AM   Result Value Ref Range    Sodium 134 (L) 136 - 145 mmol/L    Potassium 3.4 (L) 3.5 - 5.1 mmol/L    Chloride 97 95 - 110 mmol/L    CO2 24 23 - 29 mmol/L    Glucose 278 (H) 70 - 110 mg/dL    BUN 12 8 - 23 mg/dL    Creatinine 1.1 0.5 - 1.4 mg/dL    Calcium 10.6 (H) 8.7 - 10.5 mg/dL    Total Protein 8.3 6.0 - 8.4 g/dL    Albumin 3.8 3.5 - 5.2 g/dL    Total Bilirubin 0.7 0.1 - 1.0 mg/dL    Alkaline Phosphatase 79 40 - 150 U/L    AST 21 10 - 40 U/L    ALT 14 10 - 44 U/L    eGFR >60 >60 mL/min/1.73 m^2    Anion Gap 13 8 - 16 mmol/L   Troponin I    Collection Time: 01/16/25 11:13 AM   Result Value Ref Range    Troponin I 0.019 0.000 - 0.026 ng/mL   Brain natriuretic peptide    Collection Time: 01/16/25 11:13 AM   Result Value Ref Range    BNP 56 0 - 99 pg/mL   Lactic acid, plasma    Collection Time: 01/16/25 11:13 AM   Result Value Ref Range    Lactate (Lactic Acid) 3.2 (H) 0.5 - 2.2 mmol/L   D dimer, quantitative    Collection Time: 01/16/25 11:13 AM   Result Value Ref Range    D-Dimer 0.50 (H) <0.50 mg/L FEU   Iron and TIBC    Collection Time: 01/16/25 11:13 AM   Result Value Ref Range    Iron 32 (L) 45 - 160 ug/dL    Transferrin 188 (L) 200 - 375 mg/dL    TIBC 278 250 - 450 ug/dL    Saturated Iron 12 (L) 20 - 50 %   Ferritin    Collection Time: 01/16/25 11:13 AM   Result Value Ref Range    Ferritin 230 20.0 - 300.0 ng/mL   RSV Antigen Detection Nasopharyngeal Swab    Collection Time: 01/16/25 11:14 AM   Result Value Ref Range    RSV Source Nasopharyngeal Swab     RSV Ag by Molecular Method Negative Negative   POCT Influenza A/B Molecular    Collection Time: 01/16/25 11:48 AM   Result Value Ref Range    POC Molecular Influenza A Ag Negative Negative    POC Molecular Influenza B Ag Negative Negative     Acceptable Yes    POCT COVID-19 Rapid Screening    Collection Time:  01/16/25 11:48 AM   Result Value Ref Range    POC Rapid COVID Negative Negative     Acceptable Yes    Procalcitonin    Collection Time: 01/16/25 11:59 AM   Result Value Ref Range    Procalcitonin 0.07 <0.25 ng/mL   ISTAT PROCEDURE    Collection Time: 01/16/25 12:20 PM   Result Value Ref Range    POC PH 7.385 7.35 - 7.45    POC PCO2 37.9 35 - 45 mmHg    POC PO2 101 (H) 80 - 100 mmHg    POC HCO3 22.7 (L) 24 - 28 mmol/L    POC BE -2 -2 to 2 mmol/L    POC SATURATED O2 98 95 - 100 %    POC TCO2 24 23 - 27 mmol/L    Rate 75     Sample ARTERIAL     Site RR     Allens Test Pass     DelSys CPAP/BiPAP     Mode BiPAP     FiO2 30     Spont Rate 59     Sp02 93     IP 15     EP 8    POCT glucose    Collection Time: 01/16/25  3:35 PM   Result Value Ref Range    POCT Glucose 266 (H) 70 - 110 mg/dL   Lactic acid, plasma #2    Collection Time: 01/16/25  3:38 PM   Result Value Ref Range    Lactate (Lactic Acid) 1.6 0.5 - 2.2 mmol/L   POCT glucose    Collection Time: 01/16/25  5:21 PM   Result Value Ref Range    POCT Glucose 299 (H) 70 - 110 mg/dL       Microbiology Results (last 7 days)       Procedure Component Value Units Date/Time    Clostridium difficile EIA [[id number]]     Order Status: No result Specimen: Stool     Blood culture x two cultures. Draw prior to antibiotics. [[id number]] Collected: 01/16/25 1150    Order Status: Sent Specimen: Blood from Peripheral, Antecubital, Left Updated: 01/16/25 1220    Blood culture x two cultures. Draw prior to antibiotics. [[id number]] Collected: 01/16/25 1158    Order Status: Sent Specimen: Blood from Peripheral, Wrist, Right Updated: 01/16/25 1220             Imaging Results              CTA Chest Non-Coronary (PE Studies) (Final result)  Result time 01/16/25 16:20:03      Final result by Isiah Sahni MD (01/16/25 16:20:03)                   Impression:      No evidence of pulmonary embolism.    Chronic interstitial lung disease likely representing IUP.  Findings  are similar compared to prior exam.      Electronically signed by: Isiah Sahni MD  Date:    01/16/2025  Time:    16:20               Narrative:    EXAMINATION:  CTA CHEST NON CORONARY (PE STUDIES)    CLINICAL HISTORY:  Pulmonary embolism (PE) suspected, unknown D-dimer;    TECHNIQUE:  Low dose axial images, sagittal and coronal reformations were obtained from the thoracic inlet to the lung bases following the IV administration of 75 mL of Omnipaque 350.  Contrast timing was optimized to evaluate the pulmonary arteries.  MIP images were performed.    COMPARISON:  CTA chest from 12/12/2024.    FINDINGS:  Pulmonary Arteries: This study is adequate for the evaluation of pulmonary thromboembolism.  No evidence of pulmonary embolism to the level of the subsegmental arteries bilaterally.    Soft tissues of the neck:  Thyroid is enlarged in size.    Thoracic aorta:  Normal in caliber and contour.  No evidence of dissection.    Heart: No cardiomegaly.  No pericardial effusion.    Lymph nodes:  Prominent right paratracheal lymph node measuring 1.4 cm, previously measuring 1.4 cm.    Trachea and bronchi: Patent.    Lungs:  Lungs demonstrate extensive honeycombing with a lower lobe and peripheral predominance with volume loss and fibrotic changes.  Findings are consistent with interstitial lung disease, likely IUP.  No new opacity within the lungs when compared to prior exam.    Limited evaluation of the upper abdomen:  Spleen is enlarged in size measuring 12.0 cm.    Osseous structures:  No evidence of fractures or suspicious osseous lesions.                                       X-Ray Chest AP Portable (Final result)  Result time 01/16/25 12:03:22      Final result by Chris Patterson MD (01/16/25 12:03:22)                   Impression:      No interval change.  Persistent findings of interstitial lung disease      Electronically signed by: Chris Patterson MD  Date:    01/16/2025  Time:    12:03               Narrative:     EXAMINATION:  XR CHEST AP PORTABLE    CLINICAL HISTORY:  Shortness of breath;    TECHNIQUE:  Single views of the chest were performed.    COMPARISON:  Chest radiograph dated December 12, 2024    FINDINGS:  The trachea and cardiomediastinal silhouette remains stable.  As seen on the prior study, there are bilateral interstitial opacities with peripheral reticulation in the lungs suggestive of interstitial lung disease.  Overall, when compared with the prior study, there is no significant change in the cardiopulmonary status of the patient.  No pneumothorax.  Osseous structures demonstrate no evidence for acute fractures or dislocations.                                        Assessment/Plan:     * Interstitial lung disease exacerbation  IV steroids   Duo nebs   Bipap  Pulm consult      Acute hypoxic respiratory failure  Patient with Hypoxic Respiratory failure which is Acute on chronic.    he is on home oxygen at 2 LPM. Supplemental oxygen was provided and noted- Oxygen Concentration (%):  [30] 3  Signs/symptoms of respiratory failure include- increased work of breathing and respiratory distress.   Contributing diagnoses includes - Interstitial lung disease Labs and images were reviewed.   Patient Has recent ABG, which has been reviewed.   Will treat underlying causes and adjust management of respiratory failure as follows- Treat for ILD exacerbation. Continue BiPAP for now.       Immunosuppression due to drug therapy  noted      Chronic HFrEF (heart failure with reduced ejection fraction)  Stable, chronic  Not overly volume overloaded, appears more pulmonary decompensation      Type 2 diabetes mellitus without complication, without long-term current use of insulin  DM diet when able  SSI         VTE Risk Mitigation (From admission, onward)           Ordered     IP VTE LOW RISK PATIENT  Once         01/16/25 1442                  Critical care time spent on the evaluation and treatment of severe organ dysfunction,  review of pertinent labs and imaging studies, discussions with consulting providers and discussions with patient/family: 35 minutes.              Kristian Han MD  Department of Hospital Medicine  [facility] - Intensive Care

## 2025-01-17 NOTE — ASSESSMENT & PLAN NOTE
Anemia is likely due to  Fe deficiency . Most recent hemoglobin and hematocrit are listed below.  Recent Labs     01/16/25  1113 01/17/25  0414   HGB 14.4 12.3*   HCT 43.3 36.7*   -monitor serial CBC: Daily  -transfuse PRBC if patient becomes hemodynamically unstable, symptomatic or H/H drops below 7/21.  -%Fe sat 12%, will consider outpt replacement to help with oxygenation as well

## 2025-01-17 NOTE — PLAN OF CARE
Patient remains in ICU on BiPAP. Afebrile. AAO x4. No injuries, falls, and skin breakdown on this shift. Patient's son updated per Dr. Lipscomb.

## 2025-01-17 NOTE — PROGRESS NOTES
[facility] Medicine  Progress Note    Patient Name: Emmanuel Grant  MRN: [id number]  Patient Class: IP- Inpatient   Admission Date: 1/16/2025  Length of Stay: 1 days  Attending Physician: Kristian Han MD  Primary Care Provider: Wilfredo De Souza MD        Subjective     Principal Problem:Interstitial lung disease        HPI:    Emmanuel Grant is a 74 y.o. male who  has a past medical history of BPH, CAD, Chronic HFrEF, Hypertension, Kidney stone, Stroke, and Type 2 diabetes mellitus without complication, without long-term current use of insulin, presented to the ED with CC of SOB.     Patient has been SOB since 4am this morning and progressively worse. Did have URI symptoms for past 2-3 days. With intermittent fever/chills and cough. He is on home O2 at 2L,, but had to increase his home O2. In the ED, patient was decompensating and was nearly intubated. Given ativan on Bipap and was able to avoid intubation. Pulm consulted. Admitted to ICU. Maintaining his pH and labs unremarkable. COVID/FLU/RSV negative. BNP, trop, LA and procal all negative. HPI limited as patient is quite ill/tired on Bipap.     Overview/Hospital Course:  Admitted with AHRF 2/2 to ILD flare 2/2 suspected upper viral infection given symptoms prior to presentation. Steroids started. Pulm consulted. COVID/FLU/RSV negative. RIP pending.     Interval History:  NAEON.  No new issues.   CC- SOB  Son updated by palliative care, flying in today 1/17  All questions answered and updates on care given.       ROS:  General: Negative for fevers   Cardiac: Negative for chest pain   GI: Negative for abdominal distention      Vitals:    01/17/25 0400 01/17/25 0500 01/17/25 0600 01/17/25 0700   BP: 133/85 (!) 145/87 138/88 (!) 141/84   Pulse: 91 100 104 107   Resp: 19 (!) 23 (!) 26 (!) 33   Temp:       TempSrc:       SpO2: 97% 97% 100% 100%   Weight:       Height:              Body mass index is 19.81 kg/m².      PHYSICAL EXAM:  GENERAL  APPEARANCE: alert and cooperative.     HEAD: NC/AT  CARDIAC: There is no cyanosis or pallor.   LUNGS: No apparent wheezing or stridor.  ABDOMEN: Non-distended. No guarding.  MSK: No joint erythema or tenderness.   EXTREMITIES: No significant new deformity or new joint abnormality.   NEUROLOGICAL: CN II-XII grossly intact.   SKIN: No lesions or eruptions.  PSYCHIATRIC: No tangential speech. No Hyperactive features.        Recent Results (from the past 24 hours)   CBC auto differential    Collection Time: 01/16/25 11:13 AM   Result Value Ref Range    WBC 9.87 3.90 - 12.70 K/uL    RBC 5.33 4.60 - 6.20 M/uL    Hemoglobin 14.4 14.0 - 18.0 g/dL    Hematocrit 43.3 40.0 - 54.0 %    MCV 81 (L) 82 - 98 fL    MCH 27.0 27.0 - 31.0 pg    MCHC 33.3 32.0 - 36.0 g/dL    RDW 12.9 11.5 - 14.5 %    Platelets 382 150 - 450 K/uL    MPV 9.5 9.2 - 12.9 fL    Immature Granulocytes 0.3 0.0 - 0.5 %    Gran # (ANC) 7.8 (H) 1.8 - 7.7 K/uL    Immature Grans (Abs) 0.03 0.00 - 0.04 K/uL    Lymph # 1.0 1.0 - 4.8 K/uL    Mono # 0.7 0.3 - 1.0 K/uL    Eos # 0.3 0.0 - 0.5 K/uL    Baso # 0.02 0.00 - 0.20 K/uL    nRBC 0 0 /100 WBC    Gran % 79.3 (H) 38.0 - 73.0 %    Lymph % 10.2 (L) 18.0 - 48.0 %    Mono % 7.2 4.0 - 15.0 %    Eosinophil % 2.8 0.0 - 8.0 %    Basophil % 0.2 0.0 - 1.9 %    Differential Method Automated    Comprehensive metabolic panel    Collection Time: 01/16/25 11:13 AM   Result Value Ref Range    Sodium 134 (L) 136 - 145 mmol/L    Potassium 3.4 (L) 3.5 - 5.1 mmol/L    Chloride 97 95 - 110 mmol/L    CO2 24 23 - 29 mmol/L    Glucose 278 (H) 70 - 110 mg/dL    BUN 12 8 - 23 mg/dL    Creatinine 1.1 0.5 - 1.4 mg/dL    Calcium 10.6 (H) 8.7 - 10.5 mg/dL    Total Protein 8.3 6.0 - 8.4 g/dL    Albumin 3.8 3.5 - 5.2 g/dL    Total Bilirubin 0.7 0.1 - 1.0 mg/dL    Alkaline Phosphatase 79 40 - 150 U/L    AST 21 10 - 40 U/L    ALT 14 10 - 44 U/L    eGFR >60 >60 mL/min/1.73 m^2    Anion Gap 13 8 - 16 mmol/L   Troponin I    Collection Time: 01/16/25  11:13 AM   Result Value Ref Range    Troponin I 0.019 0.000 - 0.026 ng/mL   Brain natriuretic peptide    Collection Time: 01/16/25 11:13 AM   Result Value Ref Range    BNP 56 0 - 99 pg/mL   Lactic acid, plasma    Collection Time: 01/16/25 11:13 AM   Result Value Ref Range    Lactate (Lactic Acid) 3.2 (H) 0.5 - 2.2 mmol/L   D dimer, quantitative    Collection Time: 01/16/25 11:13 AM   Result Value Ref Range    D-Dimer 0.50 (H) <0.50 mg/L FEU   Iron and TIBC    Collection Time: 01/16/25 11:13 AM   Result Value Ref Range    Iron 32 (L) 45 - 160 ug/dL    Transferrin 188 (L) 200 - 375 mg/dL    TIBC 278 250 - 450 ug/dL    Saturated Iron 12 (L) 20 - 50 %   Ferritin    Collection Time: 01/16/25 11:13 AM   Result Value Ref Range    Ferritin 230 20.0 - 300.0 ng/mL   RSV Antigen Detection Nasopharyngeal Swab    Collection Time: 01/16/25 11:14 AM   Result Value Ref Range    RSV Source Nasopharyngeal Swab     RSV Ag by Molecular Method Negative Negative   POCT Influenza A/B Molecular    Collection Time: 01/16/25 11:48 AM   Result Value Ref Range    POC Molecular Influenza A Ag Negative Negative    POC Molecular Influenza B Ag Negative Negative     Acceptable Yes    POCT COVID-19 Rapid Screening    Collection Time: 01/16/25 11:48 AM   Result Value Ref Range    POC Rapid COVID Negative Negative     Acceptable Yes    Blood culture x two cultures. Draw prior to antibiotics.    Collection Time: 01/16/25 11:50 AM    Specimen: Peripheral, Antecubital, Left; Blood   Result Value Ref Range    Blood Culture, Routine No Growth to date    Blood culture x two cultures. Draw prior to antibiotics.    Collection Time: 01/16/25 11:58 AM    Specimen: Peripheral, Wrist, Right; Blood   Result Value Ref Range    Blood Culture, Routine No Growth to date    Procalcitonin    Collection Time: 01/16/25 11:59 AM   Result Value Ref Range    Procalcitonin 0.07 <0.25 ng/mL   ISTAT PROCEDURE    Collection Time: 01/16/25 12:20 PM    Result Value Ref Range    POC PH 7.385 7.35 - 7.45    POC PCO2 37.9 35 - 45 mmHg    POC PO2 101 (H) 80 - 100 mmHg    POC HCO3 22.7 (L) 24 - 28 mmol/L    POC BE -2 -2 to 2 mmol/L    POC SATURATED O2 98 95 - 100 %    POC TCO2 24 23 - 27 mmol/L    Rate 75     Sample ARTERIAL     Site RR     Allens Test Pass     DelSys CPAP/BiPAP     Mode BiPAP     FiO2 30     Spont Rate 59     Sp02 93     IP 15     EP 8    POCT glucose    Collection Time: 01/16/25  3:35 PM   Result Value Ref Range    POCT Glucose 266 (H) 70 - 110 mg/dL   Lactic acid, plasma #2    Collection Time: 01/16/25  3:38 PM   Result Value Ref Range    Lactate (Lactic Acid) 1.6 0.5 - 2.2 mmol/L   POCT glucose    Collection Time: 01/16/25  5:21 PM   Result Value Ref Range    POCT Glucose 299 (H) 70 - 110 mg/dL   POCT glucose    Collection Time: 01/16/25  6:29 PM   Result Value Ref Range    POCT Glucose 291 (H) 70 - 110 mg/dL   Urinalysis, Reflex to Urine Culture Urine, Clean Catch    Collection Time: 01/16/25  7:08 PM    Specimen: Urine   Result Value Ref Range    Specimen UA Urine, Clean Catch     Color, UA Yellow Yellow, Straw, Linda    Appearance, UA Clear Clear    pH, UA 6.0 5.0 - 8.0    Specific Gravity, UA >1.030 (A) 1.005 - 1.030    Protein, UA 1+ (A) Negative    Glucose, UA 4+ (A) Negative    Ketones, UA Trace (A) Negative    Bilirubin (UA) Negative Negative    Occult Blood UA Negative Negative    Nitrite, UA Negative Negative    Urobilinogen, UA Negative <2.0 EU/dL    Leukocytes, UA Negative Negative   Respiratory Infection Panel (PCR), Nasopharyngeal    Collection Time: 01/16/25  7:08 PM    Specimen: Nasopharyngeal Swab   Result Value Ref Range    Respiratory Infection Panel Source NP Swab    Urinalysis Microscopic    Collection Time: 01/16/25  7:08 PM   Result Value Ref Range    RBC, UA 0 0 - 4 /hpf    WBC, UA 0 0 - 5 /hpf    Bacteria None None-Occ /hpf    Yeast, UA None None    Hyaline Casts, UA 0 0-1/lpf /lpf    Microscopic Comment SEE COMMENT     Comprehensive Metabolic Panel (CMP)    Collection Time: 01/17/25  4:14 AM   Result Value Ref Range    Sodium 138 136 - 145 mmol/L    Potassium 3.7 3.5 - 5.1 mmol/L    Chloride 101 95 - 110 mmol/L    CO2 22 (L) 23 - 29 mmol/L    Glucose 280 (H) 70 - 110 mg/dL    BUN 17 8 - 23 mg/dL    Creatinine 0.8 0.5 - 1.4 mg/dL    Calcium 9.4 8.7 - 10.5 mg/dL    Total Protein 6.5 6.0 - 8.4 g/dL    Albumin 3.0 (L) 3.5 - 5.2 g/dL    Total Bilirubin 0.5 0.1 - 1.0 mg/dL    Alkaline Phosphatase 58 40 - 150 U/L    AST 14 10 - 40 U/L    ALT 11 10 - 44 U/L    eGFR >60 >60 mL/min/1.73 m^2    Anion Gap 15 8 - 16 mmol/L   Magnesium    Collection Time: 01/17/25  4:14 AM   Result Value Ref Range    Magnesium 1.9 1.6 - 2.6 mg/dL   Phosphorus    Collection Time: 01/17/25  4:14 AM   Result Value Ref Range    Phosphorus 3.3 2.7 - 4.5 mg/dL   CBC with Automated Differential    Collection Time: 01/17/25  4:14 AM   Result Value Ref Range    WBC 5.28 3.90 - 12.70 K/uL    RBC 4.55 (L) 4.60 - 6.20 M/uL    Hemoglobin 12.3 (L) 14.0 - 18.0 g/dL    Hematocrit 36.7 (L) 40.0 - 54.0 %    MCV 81 (L) 82 - 98 fL    MCH 27.0 27.0 - 31.0 pg    MCHC 33.5 32.0 - 36.0 g/dL    RDW 12.5 11.5 - 14.5 %    Platelets 302 150 - 450 K/uL    MPV 10.0 9.2 - 12.9 fL    Immature Granulocytes 0.4 0.0 - 0.5 %    Gran # (ANC) 4.4 1.8 - 7.7 K/uL    Immature Grans (Abs) 0.02 0.00 - 0.04 K/uL    Lymph # 0.7 (L) 1.0 - 4.8 K/uL    Mono # 0.2 (L) 0.3 - 1.0 K/uL    Eos # 0.0 0.0 - 0.5 K/uL    Baso # 0.00 0.00 - 0.20 K/uL    nRBC 0 0 /100 WBC    Gran % 83.5 (H) 38.0 - 73.0 %    Lymph % 12.9 (L) 18.0 - 48.0 %    Mono % 3.2 (L) 4.0 - 15.0 %    Eosinophil % 0.0 0.0 - 8.0 %    Basophil % 0.0 0.0 - 1.9 %    Differential Method Automated        Microbiology Results (last 7 days)       Procedure Component Value Units Date/Time    Respiratory Infection Panel (PCR), Nasopharyngeal [[id number]] Collected: 01/16/25 1908    Order Status: Completed Specimen: Nasopharyngeal Swab Updated: 01/16/25 1920      Respiratory Infection Panel Source NP Swab    Narrative:      Nasal Swab Only - for all other respiratory sources, order  [id number] - Respiratory Viral Panel by PCR (RSPFA)    Blood culture x two cultures. Draw prior to antibiotics. [[id number]] Collected: 01/16/25 1150    Order Status: Completed Specimen: Blood from Peripheral, Antecubital, Left Updated: 01/16/25 1912     Blood Culture, Routine No Growth to date    Narrative:      Aerobic and anaerobic    Blood culture x two cultures. Draw prior to antibiotics. [[id number]] Collected: 01/16/25 1158    Order Status: Completed Specimen: Blood from Peripheral, Wrist, Right Updated: 01/16/25 1912     Blood Culture, Routine No Growth to date    Narrative:      Aerobic and anaerobic    Clostridium difficile EIA [[id number]]     Order Status: Canceled Specimen: Stool              Imaging Results              CTA Chest Non-Coronary (PE Studies) (Final result)  Result time 01/16/25 16:20:03      Final result by Isiah Sahni MD (01/16/25 16:20:03)                   Impression:      No evidence of pulmonary embolism.    Chronic interstitial lung disease likely representing IUP.  Findings are similar compared to prior exam.      Electronically signed by: Isiah Sahni MD  Date:    01/16/2025  Time:    16:20               Narrative:    EXAMINATION:  CTA CHEST NON CORONARY (PE STUDIES)    CLINICAL HISTORY:  Pulmonary embolism (PE) suspected, unknown D-dimer;    TECHNIQUE:  Low dose axial images, sagittal and coronal reformations were obtained from the thoracic inlet to the lung bases following the IV administration of 75 mL of Omnipaque 350.  Contrast timing was optimized to evaluate the pulmonary arteries.  MIP images were performed.    COMPARISON:  CTA chest from 12/12/2024.    FINDINGS:  Pulmonary Arteries: This study is adequate for the evaluation of pulmonary thromboembolism.  No evidence of pulmonary embolism to the level of the subsegmental arteries  bilaterally.    Soft tissues of the neck:  Thyroid is enlarged in size.    Thoracic aorta:  Normal in caliber and contour.  No evidence of dissection.    Heart: No cardiomegaly.  No pericardial effusion.    Lymph nodes:  Prominent right paratracheal lymph node measuring 1.4 cm, previously measuring 1.4 cm.    Trachea and bronchi: Patent.    Lungs:  Lungs demonstrate extensive honeycombing with a lower lobe and peripheral predominance with volume loss and fibrotic changes.  Findings are consistent with interstitial lung disease, likely IUP.  No new opacity within the lungs when compared to prior exam.    Limited evaluation of the upper abdomen:  Spleen is enlarged in size measuring 12.0 cm.    Osseous structures:  No evidence of fractures or suspicious osseous lesions.                                       X-Ray Chest AP Portable (Final result)  Result time 01/16/25 12:03:22      Final result by Chris Patterson MD (01/16/25 12:03:22)                   Impression:      No interval change.  Persistent findings of interstitial lung disease      Electronically signed by: Chris Patterson MD  Date:    01/16/2025  Time:    12:03               Narrative:    EXAMINATION:  XR CHEST AP PORTABLE    CLINICAL HISTORY:  Shortness of breath;    TECHNIQUE:  Single views of the chest were performed.    COMPARISON:  Chest radiograph dated December 12, 2024    FINDINGS:  The trachea and cardiomediastinal silhouette remains stable.  As seen on the prior study, there are bilateral interstitial opacities with peripheral reticulation in the lungs suggestive of interstitial lung disease.  Overall, when compared with the prior study, there is no significant change in the cardiopulmonary status of the patient.  No pneumothorax.  Osseous structures demonstrate no evidence for acute fractures or dislocations.                                             Assessment and Plan     * Interstitial lung disease exacerbation  IV steroids   Duo nebs    Bipap  Pulm consult      Acute hypoxic respiratory failure  Patient with Hypoxic Respiratory failure which is Acute on chronic.    he is on home oxygen at 2 LPM. Supplemental oxygen was provided and noted- Oxygen Concentration (%):  [30] 3  Signs/symptoms of respiratory failure include- increased work of breathing and respiratory distress.   Contributing diagnoses includes - Interstitial lung disease Labs and images were reviewed.   Patient Has recent ABG, which has been reviewed.   Will treat underlying causes and adjust management of respiratory failure as follows- Treat for ILD exacerbation. Continue BiPAP for now.       Immunosuppression due to drug therapy  noted      Chronic HFrEF (heart failure with reduced ejection fraction)  Stable, chronic  Not overly volume overloaded, appears more pulmonary decompensation      Type 2 diabetes mellitus without complication, without long-term current use of insulin  DM diet when able  SSI         VTE Risk Mitigation (From admission, onward)           Ordered     Place sequential compression device  Until discontinued         01/17/25 0802     IP VTE LOW RISK PATIENT  Once         01/16/25 1442                    Discharge Planning   DERIC:      Code Status: Full Code   Medical Readiness for Discharge Date:   Discharge Plan A:  (tbd)            Critical care time spent on the evaluation and treatment of severe organ dysfunction, review of pertinent labs and imaging studies, discussions with consulting providers and discussions with patient/family: 35 minutes.            Kristian Han MD  Department of Hospital Medicine   [facility] - Intensive Care

## 2025-01-17 NOTE — ASSESSMENT & PLAN NOTE
-continue lantus 12 U and aspart 2 U TID with meals  -glucose checks ACHS  -monitor glucose closely shayna in setting of steroid use for ILD exacerbation

## 2025-01-17 NOTE — NURSING
[facility] [place]  Nurses Note -- 4 Eyes      1/16/2025       Skin assessed on: Q Shift      [x] No Pressure Injuries Present    [x]Prevention Measures Documented    [] Yes LDA  for Pressure Injury Previously documented     [] Yes New Pressure Injury Discovered   [] LDA for New Pressure Injury Added      Attending RN:  Delmi Chauhan RN     Second RN:  KANDI Steele

## 2025-01-17 NOTE — ASSESSMENT & PLAN NOTE
-hold CellCept. Need to rule out infectious causes of presentation. Hold on empiric antibiotics. PJP less likely on CellCept

## 2025-01-18 LAB
ALBUMIN SERPL BCP-MCNC: 3.1 G/DL (ref 3.5–5.2)
ALP SERPL-CCNC: 58 U/L (ref 40–150)
ALT SERPL W/O P-5'-P-CCNC: 12 U/L (ref 10–44)
ANION GAP SERPL CALC-SCNC: 12 MMOL/L (ref 8–16)
AST SERPL-CCNC: 12 U/L (ref 10–40)
BASOPHILS # BLD AUTO: 0.01 K/UL (ref 0–0.2)
BASOPHILS NFR BLD: 0.1 % (ref 0–1.9)
BILIRUB SERPL-MCNC: 0.4 MG/DL (ref 0.1–1)
BUN SERPL-MCNC: 23 MG/DL (ref 8–23)
CALCIUM SERPL-MCNC: 9.1 MG/DL (ref 8.7–10.5)
CHLORIDE SERPL-SCNC: 100 MMOL/L (ref 95–110)
CO2 SERPL-SCNC: 26 MMOL/L (ref 23–29)
CREAT SERPL-MCNC: 0.8 MG/DL (ref 0.5–1.4)
DIFFERENTIAL METHOD BLD: ABNORMAL
EOSINOPHIL # BLD AUTO: 0 K/UL (ref 0–0.5)
EOSINOPHIL NFR BLD: 0.1 % (ref 0–8)
ERYTHROCYTE [DISTWIDTH] IN BLOOD BY AUTOMATED COUNT: 12.9 % (ref 11.5–14.5)
EST. GFR  (NO RACE VARIABLE): >60 ML/MIN/1.73 M^2
GLUCOSE SERPL-MCNC: 306 MG/DL (ref 70–110)
HCT VFR BLD AUTO: 36.2 % (ref 40–54)
HGB BLD-MCNC: 12 G/DL (ref 14–18)
IMM GRANULOCYTES # BLD AUTO: 0.07 K/UL (ref 0–0.04)
IMM GRANULOCYTES NFR BLD AUTO: 0.6 % (ref 0–0.5)
LYMPHOCYTES # BLD AUTO: 0.6 K/UL (ref 1–4.8)
LYMPHOCYTES NFR BLD: 5.2 % (ref 18–48)
MAGNESIUM SERPL-MCNC: 1.9 MG/DL (ref 1.6–2.6)
MCH RBC QN AUTO: 27 PG (ref 27–31)
MCHC RBC AUTO-ENTMCNC: 33.1 G/DL (ref 32–36)
MCV RBC AUTO: 81 FL (ref 82–98)
MONOCYTES # BLD AUTO: 0.4 K/UL (ref 0.3–1)
MONOCYTES NFR BLD: 3.6 % (ref 4–15)
NEUTROPHILS # BLD AUTO: 10.2 K/UL (ref 1.8–7.7)
NEUTROPHILS NFR BLD: 90.4 % (ref 38–73)
NRBC BLD-RTO: 0 /100 WBC
PHOSPHATE SERPL-MCNC: 3.4 MG/DL (ref 2.7–4.5)
PLATELET # BLD AUTO: 393 K/UL (ref 150–450)
PMV BLD AUTO: 9.9 FL (ref 9.2–12.9)
POCT GLUCOSE: 322 MG/DL (ref 70–110)
POCT GLUCOSE: 364 MG/DL (ref 70–110)
POCT GLUCOSE: 396 MG/DL (ref 70–110)
POCT GLUCOSE: 415 MG/DL (ref 70–110)
POTASSIUM SERPL-SCNC: 4 MMOL/L (ref 3.5–5.1)
PROT SERPL-MCNC: 6.5 G/DL (ref 6–8.4)
RBC # BLD AUTO: 4.45 M/UL (ref 4.6–6.2)
SODIUM SERPL-SCNC: 138 MMOL/L (ref 136–145)
WBC # BLD AUTO: 11.31 K/UL (ref 3.9–12.7)

## 2025-01-18 PROCEDURE — 94660 CPAP INITIATION&MGMT: CPT | Mod: HCNC

## 2025-01-18 PROCEDURE — 86038 ANTINUCLEAR ANTIBODIES: CPT | Mod: HCNC

## 2025-01-18 PROCEDURE — 86235 NUCLEAR ANTIGEN ANTIBODY: CPT | Mod: 59,HCNC

## 2025-01-18 PROCEDURE — 85025 COMPLETE CBC W/AUTO DIFF WBC: CPT | Mod: HCNC | Performed by: STUDENT IN AN ORGANIZED HEALTH CARE EDUCATION/TRAINING PROGRAM

## 2025-01-18 PROCEDURE — 99900035 HC TECH TIME PER 15 MIN (STAT): Mod: HCNC

## 2025-01-18 PROCEDURE — 86235 NUCLEAR ANTIGEN ANTIBODY: CPT | Mod: HCNC

## 2025-01-18 PROCEDURE — 63600175 PHARM REV CODE 636 W HCPCS: Mod: JZ,TB,HCNC | Performed by: INTERNAL MEDICINE

## 2025-01-18 PROCEDURE — 25000003 PHARM REV CODE 250: Mod: HCNC | Performed by: STUDENT IN AN ORGANIZED HEALTH CARE EDUCATION/TRAINING PROGRAM

## 2025-01-18 PROCEDURE — 84100 ASSAY OF PHOSPHORUS: CPT | Mod: HCNC | Performed by: STUDENT IN AN ORGANIZED HEALTH CARE EDUCATION/TRAINING PROGRAM

## 2025-01-18 PROCEDURE — 80053 COMPREHEN METABOLIC PANEL: CPT | Mod: HCNC | Performed by: STUDENT IN AN ORGANIZED HEALTH CARE EDUCATION/TRAINING PROGRAM

## 2025-01-18 PROCEDURE — 86431 RHEUMATOID FACTOR QUANT: CPT | Mod: HCNC

## 2025-01-18 PROCEDURE — 25000003 PHARM REV CODE 250: Mod: HCNC | Performed by: CLINIC/CENTER

## 2025-01-18 PROCEDURE — 36415 COLL VENOUS BLD VENIPUNCTURE: CPT | Mod: HCNC

## 2025-01-18 PROCEDURE — 83735 ASSAY OF MAGNESIUM: CPT | Mod: HCNC | Performed by: STUDENT IN AN ORGANIZED HEALTH CARE EDUCATION/TRAINING PROGRAM

## 2025-01-18 PROCEDURE — 99232 SBSQ HOSP IP/OBS MODERATE 35: CPT | Mod: ,,, | Performed by: INTERNAL MEDICINE

## 2025-01-18 PROCEDURE — 94761 N-INVAS EAR/PLS OXIMETRY MLT: CPT | Mod: HCNC

## 2025-01-18 PROCEDURE — 11000001 HC ACUTE MED/SURG PRIVATE ROOM: Mod: HCNC

## 2025-01-18 PROCEDURE — 27000221 HC OXYGEN, UP TO 24 HOURS: Mod: HCNC

## 2025-01-18 RX ORDER — MYCOPHENOLATE MOFETIL 250 MG/1
1000 CAPSULE ORAL 2 TIMES DAILY
Status: DISCONTINUED | OUTPATIENT
Start: 2025-01-18 | End: 2025-01-20 | Stop reason: HOSPADM

## 2025-01-18 RX ADMIN — SIMETHICONE 80 MG: 80 TABLET, CHEWABLE ORAL at 06:01

## 2025-01-18 RX ADMIN — GUAIFENESIN AND DEXTROMETHORPHAN HYDROBROMIDE 1 TABLET: 600; 30 TABLET, EXTENDED RELEASE ORAL at 08:01

## 2025-01-18 RX ADMIN — INSULIN GLARGINE 12 UNITS: 100 INJECTION, SOLUTION SUBCUTANEOUS at 08:01

## 2025-01-18 RX ADMIN — MYCOPHENOLATE MOFETIL 1000 MG: 250 CAPSULE ORAL at 09:01

## 2025-01-18 RX ADMIN — FAMOTIDINE 20 MG: 10 INJECTION, SOLUTION INTRAVENOUS at 08:01

## 2025-01-18 RX ADMIN — ASPIRIN 81 MG CHEWABLE TABLET 81 MG: 81 TABLET CHEWABLE at 08:01

## 2025-01-18 RX ADMIN — METHYLPREDNISOLONE SODIUM SUCCINATE 60 MG: 125 INJECTION, POWDER, FOR SOLUTION INTRAMUSCULAR; INTRAVENOUS at 05:01

## 2025-01-18 RX ADMIN — INSULIN ASPART 5 UNITS: 100 INJECTION, SOLUTION INTRAVENOUS; SUBCUTANEOUS at 04:01

## 2025-01-18 RX ADMIN — Medication 6 MG: at 08:01

## 2025-01-18 RX ADMIN — LATANOPROST 1 DROP: 50 SOLUTION OPHTHALMIC at 08:01

## 2025-01-18 RX ADMIN — INSULIN ASPART 2 UNITS: 100 INJECTION, SOLUTION INTRAVENOUS; SUBCUTANEOUS at 11:01

## 2025-01-18 RX ADMIN — INSULIN ASPART 5 UNITS: 100 INJECTION, SOLUTION INTRAVENOUS; SUBCUTANEOUS at 11:01

## 2025-01-18 RX ADMIN — METHYLPREDNISOLONE SODIUM SUCCINATE 60 MG: 125 INJECTION, POWDER, FOR SOLUTION INTRAMUSCULAR; INTRAVENOUS at 11:01

## 2025-01-18 RX ADMIN — INSULIN ASPART 4 UNITS: 100 INJECTION, SOLUTION INTRAVENOUS; SUBCUTANEOUS at 08:01

## 2025-01-18 RX ADMIN — METOPROLOL SUCCINATE 25 MG: 25 TABLET, EXTENDED RELEASE ORAL at 08:01

## 2025-01-18 RX ADMIN — SIMETHICONE 80 MG: 80 TABLET, CHEWABLE ORAL at 07:01

## 2025-01-18 RX ADMIN — INSULIN ASPART 2 UNITS: 100 INJECTION, SOLUTION INTRAVENOUS; SUBCUTANEOUS at 08:01

## 2025-01-18 RX ADMIN — SERTRALINE HYDROCHLORIDE 25 MG: 25 TABLET ORAL at 08:01

## 2025-01-18 RX ADMIN — INSULIN ASPART 2 UNITS: 100 INJECTION, SOLUTION INTRAVENOUS; SUBCUTANEOUS at 04:01

## 2025-01-18 RX ADMIN — METHYLPREDNISOLONE SODIUM SUCCINATE 60 MG: 125 INJECTION, POWDER, FOR SOLUTION INTRAMUSCULAR; INTRAVENOUS at 12:01

## 2025-01-18 NOTE — NURSING
[facility], [facility]  Nurses Note -- 4 Eyes      1/18/2025       Skin assessed on: Q Shift      [x] No Pressure Injuries Present    [x]Prevention Measures Documented    [] Yes LDA  for Pressure Injury Previously documented     [] Yes New Pressure Injury Discovered   [] LDA for New Pressure Injury Added      Attending RN:  Leyla Menjivar, RN     Second RN:  Ro SARKAR LPN

## 2025-01-18 NOTE — ASSESSMENT & PLAN NOTE
Admitted with ILD exacerbation to the ICU; step-down to telemetry status on 01/17  S/p BiPAP in the ICU; currently on 3 L N/C  CTA chest showed no evidence of infection but consistent with history of ILD  Viral respiratory pathogens negative  CellCept held on admission  S/p Solu-Medrol 60 Q8H; de-escalated to 60 Q12H today; deescalate to 60 mg daily tomorrow; with a plan to discharge on steroid taper  Outpatient follow-up for PFT and of have consideration; however family stated that the maybe moving him to [place]; discussed with case management  Palliative care consulted; input appreciated  Change Lipitor to Crestor the setting of a ILD    T

## 2025-01-18 NOTE — SUBJECTIVE & OBJECTIVE
Interval History: Pt has been weaned down to NC 3L, satting mid to high 90s. Pt reports some continued cough and SOB but remains improved from presentation. Discussed critical importance of outpatient follow up with patient and his son and the critical need for PFTs and too start Ofev as outpatient.    Objective:     Vital Signs (Most Recent):  Temp: 97.6 °F (36.4 °C) (01/18/25 1059)  Pulse: 92 (01/18/25 1449)  Resp: 18 (01/18/25 1253)  BP: (!) 143/75 (01/18/25 1059)  SpO2: 99 % (01/18/25 1253) Vital Signs (24h Range):  Temp:  [97.6 °F (36.4 °C)-97.9 °F (36.6 °C)] 97.6 °F (36.4 °C)  Pulse:  [] 92  Resp:  [18-32] 18  SpO2:  [91 %-100 %] 99 %  BP: (130-162)/(75-89) 143/75     Weight: 58.1 kg (128 lb 1.4 oz)  Body mass index is 19.48 kg/m².      Intake/Output Summary (Last 24 hours) at 1/18/2025 1521  Last data filed at 1/18/2025 1355  Gross per 24 hour   Intake 120 ml   Output 1805 ml   Net -1685 ml        Physical Exam  Vitals and nursing note reviewed.   Constitutional:       Appearance: He is ill-appearing.      Comments: Thin appearing male.   HENT:      Head: Normocephalic.      Nose: Nose normal.      Mouth/Throat:      Mouth: Mucous membranes are moist.   Eyes:      Extraocular Movements: Extraocular movements intact.   Cardiovascular:      Rate and Rhythm: Normal rate and regular rhythm.      Pulses: Normal pulses.      Heart sounds: Normal heart sounds.   Pulmonary:      Breath sounds: Rales present.      Comments: Increased WOB with prolonged conversation.  Decreased air movement bilaterally.  Abdominal:      General: Abdomen is flat. Bowel sounds are normal.      Palpations: Abdomen is soft.   Musculoskeletal:         General: Normal range of motion.      Cervical back: Normal range of motion.      Right lower leg: No edema.      Left lower leg: No edema.   Skin:     General: Skin is warm.   Neurological:      Mental Status: He is alert.           Review of Systems   Constitutional:  Negative for  fever.   HENT:  Negative for congestion and rhinorrhea.    Eyes:  Negative for visual disturbance.   Respiratory:  Positive for cough, shortness of breath and wheezing.    Cardiovascular:  Negative for chest pain.   Gastrointestinal:  Negative for nausea and vomiting.   Genitourinary:  Negative for dysuria.   Musculoskeletal:  Negative for arthralgias.   Skin:  Negative for rash.   Neurological:  Negative for headaches.       Vents:  Oxygen Concentration (%): 32 (01/18/25 1253)    Lines/Drains/Airways       Peripheral Intravenous Line  Duration                  Peripheral IV - Single Lumen 01/16/25 1059 20 G Left Antecubital 2 days         Peripheral IV - Single Lumen 01/18/25 0618 22 G Left;Posterior Hand <1 day                    Significant Labs:    CBC/Anemia Profile:  Recent Labs   Lab 01/17/25  0414 01/18/25  0530   WBC 5.28 11.31   HGB 12.3* 12.0*   HCT 36.7* 36.2*    393   MCV 81* 81*   RDW 12.5 12.9        Chemistries:  Recent Labs   Lab 01/17/25  0414 01/18/25  0531    138   K 3.7 4.0    100   CO2 22* 26   BUN 17 23   CREATININE 0.8 0.8   CALCIUM 9.4 9.1   ALBUMIN 3.0* 3.1*   PROT 6.5 6.5   BILITOT 0.5 0.4   ALKPHOS 58 58   ALT 11 12   AST 14 12   MG 1.9 1.9   PHOS 3.3 3.4       All pertinent labs within the past 24 hours have been reviewed.    Significant Imaging:

## 2025-01-18 NOTE — ASSESSMENT & PLAN NOTE
Treat with steroids as has been responsive in the past. Assess for acute infections with resp panel. Hold CellCept.  CTA obtained without evidence of PE but consistent with hx of ILD. Has had some w/u for this with CORAZON level in the past.  -needs outpatient PFTs and consideration for Ofev  -likely needs palliative care with recurrent admissions  -hold lipitor as can be associated with ILD, send home on Crestor as much lower association with ILD  -continue solumedrol 60 q 12hrs, and plan to reduce to daily tomorrow. Likely can switch to PO Pred 60 daily Monday with plan for slow outpatient taper    -trying to get hime into clinic later in the month with PFTs

## 2025-01-18 NOTE — PROGRESS NOTES
[facility] - Telemetry  Pulmonology  Progress Note    Patient Name: Emmanuel Grant  MRN: [id number]  Admission Date: 1/16/2025  Hospital Length of Stay: 2 days  Code Status: Full Code  Attending Provider: Blanquita Hunt MD  Primary Care Provider: Wilfredo De Souza MD   Principal Problem: Interstitial lung disease    Subjective:     Interval History: Pt has been weaned down to NC 3L, satting mid to high 90s. Pt reports some continued cough and SOB but remains improved from presentation. Discussed critical importance of outpatient follow up with patient and his son and the critical need for PFTs and too start Ofev as outpatient.    Objective:     Vital Signs (Most Recent):  Temp: 97.6 °F (36.4 °C) (01/18/25 1059)  Pulse: 92 (01/18/25 1449)  Resp: 18 (01/18/25 1253)  BP: (!) 143/75 (01/18/25 1059)  SpO2: 99 % (01/18/25 1253) Vital Signs (24h Range):  Temp:  [97.6 °F (36.4 °C)-97.9 °F (36.6 °C)] 97.6 °F (36.4 °C)  Pulse:  [] 92  Resp:  [18-32] 18  SpO2:  [91 %-100 %] 99 %  BP: (130-162)/(75-89) 143/75     Weight: 58.1 kg (128 lb 1.4 oz)  Body mass index is 19.48 kg/m².      Intake/Output Summary (Last 24 hours) at 1/18/2025 1521  Last data filed at 1/18/2025 1355  Gross per 24 hour   Intake 120 ml   Output 1805 ml   Net -1685 ml        Physical Exam  Vitals and nursing note reviewed.   Constitutional:       Appearance: He is ill-appearing.      Comments: Thin appearing male.   HENT:      Head: Normocephalic.      Nose: Nose normal.      Mouth/Throat:      Mouth: Mucous membranes are moist.   Eyes:      Extraocular Movements: Extraocular movements intact.   Cardiovascular:      Rate and Rhythm: Normal rate and regular rhythm.      Pulses: Normal pulses.      Heart sounds: Normal heart sounds.   Pulmonary:      Breath sounds: Rales present.      Comments: Increased WOB with prolonged conversation.  Decreased air movement bilaterally.  Abdominal:      General: Abdomen is flat. Bowel sounds are normal.      Palpations:  Abdomen is soft.   Musculoskeletal:         General: Normal range of motion.      Cervical back: Normal range of motion.      Right lower leg: No edema.      Left lower leg: No edema.   Skin:     General: Skin is warm.   Neurological:      Mental Status: He is alert.           Review of Systems   Constitutional:  Negative for fever.   HENT:  Negative for congestion and rhinorrhea.    Eyes:  Negative for visual disturbance.   Respiratory:  Positive for cough, shortness of breath and wheezing.    Cardiovascular:  Negative for chest pain.   Gastrointestinal:  Negative for nausea and vomiting.   Genitourinary:  Negative for dysuria.   Musculoskeletal:  Negative for arthralgias.   Skin:  Negative for rash.   Neurological:  Negative for headaches.       Vents:  Oxygen Concentration (%): 32 (01/18/25 1253)    Lines/Drains/Airways       Peripheral Intravenous Line  Duration                  Peripheral IV - Single Lumen 01/16/25 1059 20 G Left Antecubital 2 days         Peripheral IV - Single Lumen 01/18/25 0618 22 G Left;Posterior Hand <1 day                    Significant Labs:    CBC/Anemia Profile:  Recent Labs   Lab 01/17/25  0414 01/18/25  0530   WBC 5.28 11.31   HGB 12.3* 12.0*   HCT 36.7* 36.2*    393   MCV 81* 81*   RDW 12.5 12.9        Chemistries:  Recent Labs   Lab 01/17/25  0414 01/18/25  0531    138   K 3.7 4.0    100   CO2 22* 26   BUN 17 23   CREATININE 0.8 0.8   CALCIUM 9.4 9.1   ALBUMIN 3.0* 3.1*   PROT 6.5 6.5   BILITOT 0.5 0.4   ALKPHOS 58 58   ALT 11 12   AST 14 12   MG 1.9 1.9   PHOS 3.3 3.4       All pertinent labs within the past 24 hours have been reviewed.    Significant Imaging:      ABG  Recent Labs   Lab 01/16/25  1220   PH 7.385   PO2 101*   PCO2 37.9   HCO3 22.7*   BE -2     Assessment/Plan:     Pulmonary  * Interstitial lung disease exacerbation  Treat with steroids as has been responsive in the past. Assess for acute infections with resp panel. Hold CellCept.  CTA obtained  without evidence of PE but consistent with hx of ILD. Has had some w/u for this with CORAZON level in the past.  -needs outpatient PFTs and consideration for Ofev  -likely needs palliative care with recurrent admissions  -hold lipitor as can be associated with ILD, send home on Crestor as much lower association with ILD  -continue solumedrol 60 q 12hrs, and plan to reduce to daily tomorrow. Likely can switch to PO Pred 60 daily Monday with plan for slow outpatient taper    -trying to get hime into clinic later in the month with PFTs      Acute hypoxic respiratory failure  Patient with Hypoxic Respiratory failure which is Acute on chronic.  he is on home oxygen at 2 LPM. Supplemental oxygen was provided and noted- Oxygen Concentration (%):  [32] 32    Signs/symptoms of respiratory failure include- increased work of breathing and respiratory distress. Contributing diagnoses includes - Interstitial lung disease Labs and images were reviewed. Patient Has recent ABG, which has been reviewed. Will treat underlying causes and adjust management of respiratory failure as follows.  -treat for ILD exacerbation. Initially required BIPAP but now weaned down to 3L NC which is near his home requirement  -abysmal prognosis if intubated and will address code status when patient able- currently very anxious (regulo 2/2 high dose steroids) and will plan to address in clinic.  -flu/COVID/RSV negative, Resp PCR negative    Cardiac/Vascular  Coronary artery disease involving native coronary artery of native heart with angina pectoris  Noted. Will need to switch his statin as outpatient  -troponin and BNP wnl on admission    Immunology/Multi System  Immunosuppression due to drug therapy  -can resume CellCept            Jeff Waldrop MD  Pulmonology  [facility] [place]

## 2025-01-18 NOTE — PROGRESS NOTES
[facility] Medicine  Progress Note    Patient Name: Emmanuel Grant  MRN: [id number]  Patient Class: IP- Inpatient   Admission Date: 1/16/2025  Length of Stay: 2 days  Attending Physician: Blanquita Hunt MD  Primary Care Provider: Wilfredo De Souza MD        Subjective     Principal Problem:Interstitial lung disease        HPI:    Emmanuel Grant is a 74 y.o. male who  has a past medical history of BPH, CAD, Chronic HFrEF, Hypertension, Kidney stone, Stroke, and Type 2 diabetes mellitus without complication, without long-term current use of insulin, presented to the ED with CC of SOB.     Patient has been SOB since 4am this morning and progressively worse. Did have URI symptoms for past 2-3 days. With intermittent fever/chills and cough. He is on home O2 at 2L,, but had to increase his home O2. In the ED, patient was decompensating and was nearly intubated. Given ativan on Bipap and was able to avoid intubation. Pulm consulted. Admitted to ICU. Maintaining his pH and labs unremarkable. COVID/FLU/RSV negative. BNP, trop, LA and procal all negative. HPI limited as patient is quite ill/tired on Bipap.     Overview/Hospital Course:  Admitted with AHRF 2/2 to ILD flare 2/2 suspected upper viral infection given symptoms prior to presentation. Steroids started. Pulm consulted. COVID/FLU/RSV negative. RIP pending.     Interval History:  No acute event overnight.  Patient seen in bed this morning; supplemental oxygen requirement still at 3 L. patient stated that he wants improve functionality be able to take care of himself.  Steroid de-escalated to 60 mg Q12H.  Family considering moving patient to [place] for better support; case management informed    Review of Systems   Constitutional:  Negative for activity change, appetite change, chills and fever.   Respiratory:  Positive for shortness of breath and wheezing. Negative for cough and choking.    Cardiovascular:  Negative for chest pain,  palpitations and leg swelling.     Objective:     Vital Signs (Most Recent):  Temp: 97.6 °F (36.4 °C) (01/18/25 1059)  Pulse: 92 (01/18/25 1449)  Resp: 18 (01/18/25 1253)  BP: (!) 143/75 (01/18/25 1059)  SpO2: 99 % (01/18/25 1253) Vital Signs (24h Range):  Temp:  [97.6 °F (36.4 °C)-97.9 °F (36.6 °C)] 97.6 °F (36.4 °C)  Pulse:  [] 92  Resp:  [18-32] 18  SpO2:  [91 %-100 %] 99 %  BP: (123-162)/(75-89) 143/75     Weight: 58.1 kg (128 lb 1.4 oz)  Body mass index is 19.48 kg/m².    Intake/Output Summary (Last 24 hours) at 1/18/2025 1454  Last data filed at 1/18/2025 1355  Gross per 24 hour   Intake 120 ml   Output 2505 ml   Net -2385 ml         Physical Exam  Constitutional:       Appearance: He is ill-appearing.   HENT:      Head: Normocephalic and atraumatic.   Cardiovascular:      Rate and Rhythm: Normal rate.      Heart sounds: No murmur heard.     No friction rub. No gallop.   Pulmonary:      Effort: Respiratory distress present.      Breath sounds: Wheezing and rhonchi present. No rales.   Chest:      Chest wall: No tenderness.   Abdominal:      General: There is no distension.      Palpations: Abdomen is soft.      Tenderness: There is no abdominal tenderness.   Neurological:      General: No focal deficit present.      Mental Status: He is alert and oriented to person, place, and time.             Significant Labs: All pertinent labs within the past 24 hours have been reviewed.    Significant Imaging: I have reviewed all pertinent imaging results/findings within the past 24 hours.    Assessment and Plan     * Interstitial lung disease exacerbation  Admitted with ILD exacerbation to the ICU; step-down to telemetry status on 01/17  S/p BiPAP in the ICU; currently on 3 L N/C  CTA chest showed no evidence of infection but consistent with history of ILD  Viral respiratory pathogens negative  CellCept held on admission  S/p Solu-Medrol 60 Q8H; de-escalated to 60 Q12H today; deescalate to 60 mg daily tomorrow;  with a plan to discharge on steroid taper  Outpatient follow-up for PFT and of have consideration; however family stated that the maybe moving him to [place]; discussed with case management  Palliative care consulted; input appreciated  Change Lipitor to Crestor the setting of a ILD    T    Immunosuppression due to drug therapy  noted      Acute hypoxic respiratory failure  Patient with Hypoxic Respiratory failure which is Acute on chronic.    he is on home oxygen at 2 LPM. Supplemental oxygen was provided and noted- Oxygen Concentration (%):  [30] 3  Signs/symptoms of respiratory failure include- increased work of breathing and respiratory distress.   Contributing diagnoses includes - Interstitial lung disease Labs and images were reviewed.   Patient Has recent ABG, which has been reviewed.   Will treat underlying causes and adjust management of respiratory failure as follows- Treat for ILD exacerbation. Continue BiPAP for now.       Chronic HFrEF (heart failure with reduced ejection fraction)  Stable, chronic  Not overly volume overloaded, appears more pulmonary decompensation      Type 2 diabetes mellitus without complication, without long-term current use of insulin  Diabetic diet  SSI   Maintain blood glucose log; titrate to effect        VTE Risk Mitigation (From admission, onward)           Ordered     Place sequential compression device  Until discontinued         01/17/25 0802     IP VTE LOW RISK PATIENT  Once         01/16/25 1442                    Discharge Planning   DERIC:      Code Status: Full Code   Medical Readiness for Discharge Date:   Discharge Plan A:  (tbd)                        Blanquita Hunt MD  Department of Hospital Medicine   [facility] [place] [facility]

## 2025-01-18 NOTE — NURSING
[facility] [place]  Nurses Note -- 4 Eyes      1/17/2025       Skin assessed on: Q Shift      [x] No Pressure Injuries Present    [x]Prevention Measures Documented    [] Yes LDA  for Pressure Injury Previously documented     [] Yes New Pressure Injury Discovered   [] LDA for New Pressure Injury Added      Attending RN:  Ivette Renee RN     Second RN:  Ken RN

## 2025-01-18 NOTE — PLAN OF CARE
Pts son, Emmanuel Grant Jr. Mentioned pt may be moving to [place] to be closer to family, he asked about insurance requirements/process to move. Referred son to call Fredi who will assist with that process.

## 2025-01-18 NOTE — ASSESSMENT & PLAN NOTE
Patient with Hypoxic Respiratory failure which is Acute on chronic.  he is on home oxygen at 2 LPM. Supplemental oxygen was provided and noted- Oxygen Concentration (%):  [32] 32    Signs/symptoms of respiratory failure include- increased work of breathing and respiratory distress. Contributing diagnoses includes - Interstitial lung disease Labs and images were reviewed. Patient Has recent ABG, which has been reviewed. Will treat underlying causes and adjust management of respiratory failure as follows.  -treat for ILD exacerbation. Initially required BIPAP but now weaned down to 3L NC which is near his home requirement  -abysmal prognosis if intubated and will address code status when patient able- currently very anxious (regulo 2/2 high dose steroids) and will plan to address in clinic.  -flu/COVID/RSV negative, Resp PCR negative

## 2025-01-18 NOTE — NURSING TRANSFER
Nursing Transfer Note      1/17/2025   8:50 PM    Nurse giving handoff:Ivette  Nurse receiving handoff:Ro    Reason patient is being transferred: step down from ICU    Transfer To: rm 325    Transfer via wheelchair    Transfer with cardiac monitoring    Transported by transporter, nurse    Transfer Vital Signs:  Blood Pressure:144/86  Heart Rate:95  O2:93  Temperature:97.8  Respirations:22    Telemetry: Box Number [id number]  Order for Tele Monitor? Yes    Additional Lines: Oxygen    Medicines sent: insulin pen    Any special needs or follow-up needed: none    Patient belongings transferred with patient: Yes    Chart send with patient: Yes    Notified:     Patient reassessed at:  (date, time)  1  Upon arrival to floor: patient oriented to room, call bell in reach, and bed in lowest position

## 2025-01-19 PROBLEM — K21.00 GERD WITH ESOPHAGITIS: Status: ACTIVE | Noted: 2025-01-19

## 2025-01-19 LAB
ALBUMIN SERPL BCP-MCNC: 3.2 G/DL (ref 3.5–5.2)
ALP SERPL-CCNC: 64 U/L (ref 40–150)
ALT SERPL W/O P-5'-P-CCNC: 16 U/L (ref 10–44)
ANION GAP SERPL CALC-SCNC: 11 MMOL/L (ref 8–16)
AST SERPL-CCNC: 13 U/L (ref 10–40)
BASOPHILS # BLD AUTO: 0.01 K/UL (ref 0–0.2)
BASOPHILS NFR BLD: 0.1 % (ref 0–1.9)
BILIRUB SERPL-MCNC: 0.5 MG/DL (ref 0.1–1)
BUN SERPL-MCNC: 23 MG/DL (ref 8–23)
CALCIUM SERPL-MCNC: 9.5 MG/DL (ref 8.7–10.5)
CHLORIDE SERPL-SCNC: 98 MMOL/L (ref 95–110)
CO2 SERPL-SCNC: 28 MMOL/L (ref 23–29)
CREAT SERPL-MCNC: 1 MG/DL (ref 0.5–1.4)
DIFFERENTIAL METHOD BLD: ABNORMAL
EOSINOPHIL # BLD AUTO: 0 K/UL (ref 0–0.5)
EOSINOPHIL NFR BLD: 0 % (ref 0–8)
ERYTHROCYTE [DISTWIDTH] IN BLOOD BY AUTOMATED COUNT: 12.7 % (ref 11.5–14.5)
EST. GFR  (NO RACE VARIABLE): >60 ML/MIN/1.73 M^2
GLUCOSE SERPL-MCNC: 341 MG/DL (ref 70–110)
HCT VFR BLD AUTO: 39.1 % (ref 40–54)
HGB BLD-MCNC: 12.9 G/DL (ref 14–18)
IMM GRANULOCYTES # BLD AUTO: 0.06 K/UL (ref 0–0.04)
IMM GRANULOCYTES NFR BLD AUTO: 0.6 % (ref 0–0.5)
LYMPHOCYTES # BLD AUTO: 0.4 K/UL (ref 1–4.8)
LYMPHOCYTES NFR BLD: 4.3 % (ref 18–48)
MAGNESIUM SERPL-MCNC: 1.9 MG/DL (ref 1.6–2.6)
MCH RBC QN AUTO: 27.1 PG (ref 27–31)
MCHC RBC AUTO-ENTMCNC: 33 G/DL (ref 32–36)
MCV RBC AUTO: 82 FL (ref 82–98)
MONOCYTES # BLD AUTO: 0.4 K/UL (ref 0.3–1)
MONOCYTES NFR BLD: 4.2 % (ref 4–15)
NEUTROPHILS # BLD AUTO: 8.9 K/UL (ref 1.8–7.7)
NEUTROPHILS NFR BLD: 90.8 % (ref 38–73)
NRBC BLD-RTO: 0 /100 WBC
PHOSPHATE SERPL-MCNC: 3.2 MG/DL (ref 2.7–4.5)
PLATELET # BLD AUTO: 435 K/UL (ref 150–450)
PMV BLD AUTO: 9.8 FL (ref 9.2–12.9)
POCT GLUCOSE: 326 MG/DL (ref 70–110)
POCT GLUCOSE: 349 MG/DL (ref 70–110)
POCT GLUCOSE: 392 MG/DL (ref 70–110)
POTASSIUM SERPL-SCNC: 3.6 MMOL/L (ref 3.5–5.1)
PROT SERPL-MCNC: 6.7 G/DL (ref 6–8.4)
RBC # BLD AUTO: 4.76 M/UL (ref 4.6–6.2)
RHEUMATOID FACT SERPL-ACNC: <13 IU/ML (ref 0–15)
SODIUM SERPL-SCNC: 137 MMOL/L (ref 136–145)
WBC # BLD AUTO: 9.8 K/UL (ref 3.9–12.7)

## 2025-01-19 PROCEDURE — 83735 ASSAY OF MAGNESIUM: CPT | Mod: HCNC | Performed by: STUDENT IN AN ORGANIZED HEALTH CARE EDUCATION/TRAINING PROGRAM

## 2025-01-19 PROCEDURE — 99232 SBSQ HOSP IP/OBS MODERATE 35: CPT | Mod: ,,, | Performed by: INTERNAL MEDICINE

## 2025-01-19 PROCEDURE — 84100 ASSAY OF PHOSPHORUS: CPT | Mod: HCNC | Performed by: STUDENT IN AN ORGANIZED HEALTH CARE EDUCATION/TRAINING PROGRAM

## 2025-01-19 PROCEDURE — 80053 COMPREHEN METABOLIC PANEL: CPT | Mod: HCNC | Performed by: STUDENT IN AN ORGANIZED HEALTH CARE EDUCATION/TRAINING PROGRAM

## 2025-01-19 PROCEDURE — 63600175 PHARM REV CODE 636 W HCPCS: Mod: HCNC | Performed by: INTERNAL MEDICINE

## 2025-01-19 PROCEDURE — 99900035 HC TECH TIME PER 15 MIN (STAT): Mod: HCNC

## 2025-01-19 PROCEDURE — 25000003 PHARM REV CODE 250: Mod: HCNC | Performed by: CLINIC/CENTER

## 2025-01-19 PROCEDURE — 36415 COLL VENOUS BLD VENIPUNCTURE: CPT | Mod: HCNC | Performed by: STUDENT IN AN ORGANIZED HEALTH CARE EDUCATION/TRAINING PROGRAM

## 2025-01-19 PROCEDURE — 63600175 PHARM REV CODE 636 W HCPCS: Mod: JZ,TB,HCNC

## 2025-01-19 PROCEDURE — 11000001 HC ACUTE MED/SURG PRIVATE ROOM: Mod: HCNC

## 2025-01-19 PROCEDURE — 94761 N-INVAS EAR/PLS OXIMETRY MLT: CPT | Mod: HCNC

## 2025-01-19 PROCEDURE — 85025 COMPLETE CBC W/AUTO DIFF WBC: CPT | Mod: HCNC | Performed by: STUDENT IN AN ORGANIZED HEALTH CARE EDUCATION/TRAINING PROGRAM

## 2025-01-19 PROCEDURE — 27000221 HC OXYGEN, UP TO 24 HOURS: Mod: HCNC

## 2025-01-19 PROCEDURE — 25000003 PHARM REV CODE 250: Mod: HCNC

## 2025-01-19 PROCEDURE — 25000003 PHARM REV CODE 250: Mod: HCNC | Performed by: STUDENT IN AN ORGANIZED HEALTH CARE EDUCATION/TRAINING PROGRAM

## 2025-01-19 RX ORDER — PANTOPRAZOLE SODIUM 40 MG/1
40 TABLET, DELAYED RELEASE ORAL DAILY
Status: DISCONTINUED | OUTPATIENT
Start: 2025-01-19 | End: 2025-01-19

## 2025-01-19 RX ORDER — SIMETHICONE 80 MG
1 TABLET,CHEWABLE ORAL
Status: DISCONTINUED | OUTPATIENT
Start: 2025-01-19 | End: 2025-01-20 | Stop reason: HOSPADM

## 2025-01-19 RX ORDER — INSULIN GLARGINE 100 [IU]/ML
15 INJECTION, SOLUTION SUBCUTANEOUS DAILY
Status: DISCONTINUED | OUTPATIENT
Start: 2025-01-20 | End: 2025-01-20 | Stop reason: HOSPADM

## 2025-01-19 RX ORDER — INSULIN ASPART 100 [IU]/ML
5 INJECTION, SOLUTION INTRAVENOUS; SUBCUTANEOUS
Status: DISCONTINUED | OUTPATIENT
Start: 2025-01-19 | End: 2025-01-20 | Stop reason: HOSPADM

## 2025-01-19 RX ORDER — FAMOTIDINE 20 MG/1
20 TABLET, FILM COATED ORAL 2 TIMES DAILY
Status: DISCONTINUED | OUTPATIENT
Start: 2025-01-19 | End: 2025-01-20 | Stop reason: HOSPADM

## 2025-01-19 RX ORDER — INSULIN GLARGINE 100 [IU]/ML
20 INJECTION, SOLUTION SUBCUTANEOUS DAILY
Status: DISCONTINUED | OUTPATIENT
Start: 2025-01-20 | End: 2025-01-19

## 2025-01-19 RX ADMIN — SENNOSIDES AND DOCUSATE SODIUM 1 TABLET: 50; 8.6 TABLET ORAL at 07:01

## 2025-01-19 RX ADMIN — INSULIN ASPART 2 UNITS: 100 INJECTION, SOLUTION INTRAVENOUS; SUBCUTANEOUS at 08:01

## 2025-01-19 RX ADMIN — SIMETHICONE 80 MG: 80 TABLET, CHEWABLE ORAL at 06:01

## 2025-01-19 RX ADMIN — SIMETHICONE 80 MG: 80 TABLET, CHEWABLE ORAL at 01:01

## 2025-01-19 RX ADMIN — FAMOTIDINE 20 MG: 10 INJECTION, SOLUTION INTRAVENOUS at 08:01

## 2025-01-19 RX ADMIN — LATANOPROST 1 DROP: 50 SOLUTION OPHTHALMIC at 09:01

## 2025-01-19 RX ADMIN — ASPIRIN 81 MG CHEWABLE TABLET 81 MG: 81 TABLET CHEWABLE at 08:01

## 2025-01-19 RX ADMIN — Medication 6 MG: at 09:01

## 2025-01-19 RX ADMIN — MYCOPHENOLATE MOFETIL 1000 MG: 250 CAPSULE ORAL at 08:01

## 2025-01-19 RX ADMIN — INSULIN ASPART 5 UNITS: 100 INJECTION, SOLUTION INTRAVENOUS; SUBCUTANEOUS at 12:01

## 2025-01-19 RX ADMIN — GUAIFENESIN AND DEXTROMETHORPHAN HYDROBROMIDE 1 TABLET: 600; 30 TABLET, EXTENDED RELEASE ORAL at 09:01

## 2025-01-19 RX ADMIN — METHYLPREDNISOLONE SODIUM SUCCINATE 60 MG: 40 INJECTION, POWDER, FOR SOLUTION INTRAMUSCULAR; INTRAVENOUS at 01:01

## 2025-01-19 RX ADMIN — INSULIN ASPART 2 UNITS: 100 INJECTION, SOLUTION INTRAVENOUS; SUBCUTANEOUS at 09:01

## 2025-01-19 RX ADMIN — INSULIN GLARGINE 12 UNITS: 100 INJECTION, SOLUTION SUBCUTANEOUS at 08:01

## 2025-01-19 RX ADMIN — SERTRALINE HYDROCHLORIDE 25 MG: 25 TABLET ORAL at 08:01

## 2025-01-19 RX ADMIN — MYCOPHENOLATE MOFETIL 1000 MG: 250 CAPSULE ORAL at 09:01

## 2025-01-19 RX ADMIN — POTASSIUM BICARBONATE 50 MEQ: 977.5 TABLET, EFFERVESCENT ORAL at 10:01

## 2025-01-19 RX ADMIN — METOPROLOL SUCCINATE 25 MG: 25 TABLET, EXTENDED RELEASE ORAL at 08:01

## 2025-01-19 RX ADMIN — INSULIN ASPART 5 UNITS: 100 INJECTION, SOLUTION INTRAVENOUS; SUBCUTANEOUS at 05:01

## 2025-01-19 RX ADMIN — FAMOTIDINE 20 MG: 20 TABLET ORAL at 09:01

## 2025-01-19 RX ADMIN — INSULIN ASPART 4 UNITS: 100 INJECTION, SOLUTION INTRAVENOUS; SUBCUTANEOUS at 08:01

## 2025-01-19 RX ADMIN — INSULIN ASPART 4 UNITS: 100 INJECTION, SOLUTION INTRAVENOUS; SUBCUTANEOUS at 05:01

## 2025-01-19 RX ADMIN — GUAIFENESIN AND DEXTROMETHORPHAN HYDROBROMIDE 1 TABLET: 600; 30 TABLET, EXTENDED RELEASE ORAL at 08:01

## 2025-01-19 NOTE — PROGRESS NOTES
[facility]  [facility]  [facility] Note    Patient Name: Emmanuel Grant  MRN: [id number]  Admission Date: 1/16/2025  Hospital Length of Stay: 3 days  Code Status: Full Code  Attending Provider: Blanquita Hunt MD  Primary Care Provider: Wilfredo De Souza MD   Principal Problem: Interstitial lung disease    Subjective:     Interval History: Pt has been weaned down to NC 3L, satting mid to high 90s. Pt reports some continued cough and SOB but remains improved from presentation. Discussed critical importance of outpatient follow up with patient and his son and the critical need for PFTs and too start Ofev as outpatient. No new issues.    Discussed case in detail with hospitalist especially outpatient plan.     Objective:     Vital Signs (Most Recent):  Temp: 97.5 °F (36.4 °C) (01/19/25 1122)  Pulse: 67 (01/19/25 1122)  Resp: 18 (01/19/25 1122)  BP: (!) 142/89 (01/19/25 1122)  SpO2: 100 % (01/19/25 1122) Vital Signs (24h Range):  Temp:  [97.5 °F (36.4 °C)-98.5 °F (36.9 °C)] 97.5 °F (36.4 °C)  Pulse:  [59-93] 67  Resp:  [18-20] 18  SpO2:  [98 %-100 %] 100 %  BP: (142-172)/(86-89) 142/89     Weight: 58 kg (127 lb 13.9 oz)  Body mass index is 19.44 kg/m².      Intake/Output Summary (Last 24 hours) at 1/19/2025 1339  Last data filed at 1/19/2025 0800  Gross per 24 hour   Intake 480 ml   Output 2780 ml   Net -2300 ml        Physical Exam  Vitals and nursing note reviewed.   Constitutional:       Appearance: He is ill-appearing.      Comments: Thin appearing male.   HENT:      Head: Normocephalic.      Nose: Nose normal.      Mouth/Throat:      Mouth: Mucous membranes are moist.   Eyes:      Extraocular Movements: Extraocular movements intact.   Cardiovascular:      Rate and Rhythm: Normal rate and regular rhythm.      Pulses: Normal pulses.      Heart sounds: Normal heart sounds.   Pulmonary:      Breath sounds: Rales present.      Comments: Increased WOB with prolonged conversation.  Decreased air movement  bilaterally.  Abdominal:      General: Abdomen is flat. Bowel sounds are normal.      Palpations: Abdomen is soft.   Musculoskeletal:         General: Normal range of motion.      Cervical back: Normal range of motion.      Right lower leg: No edema.      Left lower leg: No edema.   Skin:     General: Skin is warm.   Neurological:      Mental Status: He is alert.           Review of Systems   Constitutional:  Negative for fever.   HENT:  Negative for congestion and rhinorrhea.    Eyes:  Negative for visual disturbance.   Respiratory:  Positive for cough, shortness of breath and wheezing.    Cardiovascular:  Negative for chest pain.   Gastrointestinal:  Negative for nausea and vomiting.   Genitourinary:  Negative for dysuria.   Musculoskeletal:  Negative for arthralgias.   Skin:  Negative for rash.   Neurological:  Negative for headaches.       Vents:  Oxygen Concentration (%): 32 (01/18/25 1253)    Lines/Drains/Airways       Peripheral Intravenous Line  Duration                  Peripheral IV - Single Lumen 01/16/25 1059 20 G Left Antecubital 3 days         Peripheral IV - Single Lumen 01/18/25 0618 22 G Left;Posterior Hand 1 day                    Significant Labs:    CBC/Anemia Profile:  Recent Labs   Lab 01/18/25  0530 01/19/25  0701   WBC 11.31 9.80   HGB 12.0* 12.9*   HCT 36.2* 39.1*    435   MCV 81* 82   RDW 12.9 12.7        Chemistries:  Recent Labs   Lab 01/18/25  0531 01/19/25  0701    137   K 4.0 3.6    98   CO2 26 28   BUN 23 23   CREATININE 0.8 1.0   CALCIUM 9.1 9.5   ALBUMIN 3.1* 3.2*   PROT 6.5 6.7   BILITOT 0.4 0.5   ALKPHOS 58 64   ALT 12 16   AST 12 13   MG 1.9 1.9   PHOS 3.4 3.2       All pertinent labs within the past 24 hours have been reviewed.    Significant Imaging:      ABG  Recent Labs   Lab 01/16/25  1220   PH 7.385   PO2 101*   PCO2 37.9   HCO3 22.7*   BE -2     Assessment/Plan:     Pulmonary  * Interstitial lung disease exacerbation  Treat with steroids as has been  responsive in the past. Assess for acute infections with resp panel. Hold CellCept.  CTA obtained without evidence of PE but consistent with hx of ILD. Has had some w/u for this with CORAZON level in the past.  -needs outpatient PFTs and consideration for Ofev  -likely needs palliative care with recurrent admissions  -hold lipitor as can be associated with ILD, send home on Crestor as much lower association with ILD  -continue solumedrol 60  daily; can switch to PO Pred 60 daily Monday with plan for slow outpatient taper- continue 60 mg daily until seen in clinic at end of the month    -have messaged to get him into clinic later in the month with PFTs      Cardiac/Vascular  Chronic HFrEF (heart failure with reduced ejection fraction)  Last echo 10/2024 EF 55%, G1DD, regional wall motion abnormality present.  -BNP wnl on admission  -monitor for evidence of volume overload, CXR and CTA not suggestive as cause of respiratory failure at this time    Coronary artery disease involving native coronary artery of native heart with angina pectoris  Noted. Will need to switch his statin as outpatient  -troponin and BNP wnl on admission  -  -Send out of rosuvastatin 5 mg daily and do not resume atorvastatin.    Immunology/Multi System  Immunosuppression due to drug therapy  -1/18 resumed CellCept       Will sign off. Please call with questions or change in status.      Jeff Waldrop MD  Pulmonology  [facility] - [place] - Telemetry

## 2025-01-19 NOTE — ASSESSMENT & PLAN NOTE
Noted. Will need to switch his statin as outpatient  -troponin and BNP wnl on admission  -  -Send out of rosuvastatin 5 mg daily and do not resume atorvastatin.

## 2025-01-19 NOTE — ASSESSMENT & PLAN NOTE
-patient complaining of nausea and epigastric fullness  -we will start Protonix 40 mg daily for prophylaxis while on steroids

## 2025-01-19 NOTE — ASSESSMENT & PLAN NOTE
Treat with steroids as has been responsive in the past. Assess for acute infections with resp panel. Hold CellCept.  CTA obtained without evidence of PE but consistent with hx of ILD. Has had some w/u for this with CORAZON level in the past.  -needs outpatient PFTs and consideration for Ofev  -likely needs palliative care with recurrent admissions  -hold lipitor as can be associated with ILD, send home on Crestor as much lower association with ILD  -continue solumedrol 60  daily; can switch to PO Pred 60 daily Monday with plan for slow outpatient taper- continue 60 mg daily until seen in clinic at end of the month    -have messaged to get him into clinic later in the month with PFTs

## 2025-01-19 NOTE — SUBJECTIVE & OBJECTIVE
Interval History:  No acute event overnight.  Patient complaining of epigastric fullness, Solu-Medrol decreased to 60 mg daily.  Supplemental oxygen requirement remained stable    Review of Systems   Constitutional:  Negative for activity change, appetite change, fatigue and fever.   Respiratory:  Positive for shortness of breath and wheezing. Negative for apnea, choking and chest tightness.    Cardiovascular:  Negative for chest pain and leg swelling.   Gastrointestinal:  Negative for nausea and vomiting.     Objective:     Vital Signs (Most Recent):  Temp: 97.5 °F (36.4 °C) (01/19/25 1122)  Pulse: 67 (01/19/25 1122)  Resp: 18 (01/19/25 1122)  BP: (!) 142/89 (01/19/25 1122)  SpO2: 100 % (01/19/25 1122) Vital Signs (24h Range):  Temp:  [97.5 °F (36.4 °C)-98.5 °F (36.9 °C)] 97.5 °F (36.4 °C)  Pulse:  [59-93] 67  Resp:  [18-20] 18  SpO2:  [98 %-100 %] 100 %  BP: (142-172)/(86-89) 142/89     Weight: 58 kg (127 lb 13.9 oz)  Body mass index is 19.44 kg/m².    Intake/Output Summary (Last 24 hours) at 1/19/2025 1247  Last data filed at 1/19/2025 0800  Gross per 24 hour   Intake 720 ml   Output 2780 ml   Net -2060 ml         Physical Exam  Constitutional:       General: He is not in acute distress.     Appearance: He is not toxic-appearing or diaphoretic.   Cardiovascular:      Rate and Rhythm: Normal rate and regular rhythm.      Pulses: Normal pulses.      Heart sounds: Normal heart sounds. No murmur heard.     No friction rub.   Pulmonary:      Effort: Respiratory distress present.      Breath sounds: No stridor. Wheezing present. No rhonchi or rales.   Chest:      Chest wall: No tenderness.   Abdominal:      General: Abdomen is flat. There is no distension.      Palpations: Abdomen is soft.      Tenderness: There is no abdominal tenderness. There is no guarding.   Neurological:      General: No focal deficit present.      Mental Status: He is alert and oriented to person, place, and time.             Significant Labs:  All pertinent labs within the past 24 hours have been reviewed.    Significant Imaging: I have reviewed all pertinent imaging results/findings within the past 24 hours.

## 2025-01-19 NOTE — PROGRESS NOTES
[facility] Medicine  Progress Note    Patient Name: Emmanuel Grant  MRN: [id number]  Patient Class: IP- Inpatient   Admission Date: 1/16/2025  Length of Stay: 3 days  Attending Physician: Blanquita Hunt MD  Primary Care Provider: Wilfredo De Souza MD        Subjective     Principal Problem:Interstitial lung disease        HPI:    Emmanuel Grant is a 74 y.o. male who  has a past medical history of BPH, CAD, Chronic HFrEF, Hypertension, Kidney stone, Stroke, and Type 2 diabetes mellitus without complication, without long-term current use of insulin, presented to the ED with CC of SOB.     Patient has been SOB since 4am this morning and progressively worse. Did have URI symptoms for past 2-3 days. With intermittent fever/chills and cough. He is on home O2 at 2L,, but had to increase his home O2. In the ED, patient was decompensating and was nearly intubated. Given ativan on Bipap and was able to avoid intubation. Pulm consulted. Admitted to ICU. Maintaining his pH and labs unremarkable. COVID/FLU/RSV negative. BNP, trop, LA and procal all negative. HPI limited as patient is quite ill/tired on Bipap.     Overview/Hospital Course:  Admitted with AHRF 2/2 to ILD flare 2/2 suspected upper viral infection given symptoms prior to presentation. Steroids started. Pulm consulted. COVID/FLU/RSV negative. RIP pending.     Interval History:  No acute event overnight.  Patient complaining of epigastric fullness, Solu-Medrol decreased to 60 mg daily.  Supplemental oxygen requirement remained stable    Review of Systems   Constitutional:  Negative for activity change, appetite change, fatigue and fever.   Respiratory:  Positive for shortness of breath and wheezing. Negative for apnea, choking and chest tightness.    Cardiovascular:  Negative for chest pain and leg swelling.   Gastrointestinal:  Negative for nausea and vomiting.     Objective:     Vital Signs (Most Recent):  Temp: 97.5 °F (36.4 °C) (01/19/25  1122)  Pulse: 67 (01/19/25 1122)  Resp: 18 (01/19/25 1122)  BP: (!) 142/89 (01/19/25 1122)  SpO2: 100 % (01/19/25 1122) Vital Signs (24h Range):  Temp:  [97.5 °F (36.4 °C)-98.5 °F (36.9 °C)] 97.5 °F (36.4 °C)  Pulse:  [59-93] 67  Resp:  [18-20] 18  SpO2:  [98 %-100 %] 100 %  BP: (142-172)/(86-89) 142/89     Weight: 58 kg (127 lb 13.9 oz)  Body mass index is 19.44 kg/m².    Intake/Output Summary (Last 24 hours) at 1/19/2025 1247  Last data filed at 1/19/2025 0800  Gross per 24 hour   Intake 720 ml   Output 2780 ml   Net -2060 ml         Physical Exam  Constitutional:       General: He is not in acute distress.     Appearance: He is not toxic-appearing or diaphoretic.   Cardiovascular:      Rate and Rhythm: Normal rate and regular rhythm.      Pulses: Normal pulses.      Heart sounds: Normal heart sounds. No murmur heard.     No friction rub.   Pulmonary:      Effort: Respiratory distress present.      Breath sounds: No stridor. Wheezing present. No rhonchi or rales.   Chest:      Chest wall: No tenderness.   Abdominal:      General: Abdomen is flat. There is no distension.      Palpations: Abdomen is soft.      Tenderness: There is no abdominal tenderness. There is no guarding.   Neurological:      General: No focal deficit present.      Mental Status: He is alert and oriented to person, place, and time.             Significant Labs: All pertinent labs within the past 24 hours have been reviewed.    Significant Imaging: I have reviewed all pertinent imaging results/findings within the past 24 hours.    Assessment and Plan     * Interstitial lung disease exacerbation  Admitted with ILD exacerbation to the ICU; step-down to telemetry status on 01/17  S/p BiPAP in the ICU; currently on 3 L N/C  CTA chest showed no evidence of infection but consistent with history of ILD  Viral respiratory pathogens negative  CellCept held on admission  S/p Solu-Medrol 60 Q8H; de-escalated to 60 Q12H today; deescalated to 60 mg QD on  1/19; with a plan to discharge on p.o. prednisone 60 mg until seen in [facility]ology [facility]  Outpatient follow-up for PFT and of have consideration; however family stated that the maybe moving him to [place]; discussed with case management  CT home CellCept  Palliative care consulted; input appreciated  Change Lipitor to Crestor the setting of a ILD      GERD with esophagitis  -patient complaining of nausea and epigastric fullness  -we will start Protonix 40 mg daily for prophylaxis while on steroids      Immunosuppression due to drug therapy  noted      Acute hypoxic respiratory failure  Patient with Hypoxic Respiratory failure which is Acute on chronic.    he is on home oxygen at 2 LPM. Supplemental oxygen was provided and noted- Oxygen Concentration (%):  [30] 3  Signs/symptoms of respiratory failure include- increased work of breathing and respiratory distress.   Contributing diagnoses includes - Interstitial lung disease Labs and images were reviewed.   Patient Has recent ABG, which has been reviewed.   Will treat underlying causes and adjust management of respiratory failure as follows- Treat for ILD exacerbation. Continue BiPAP for now.       Chronic HFrEF (heart failure with reduced ejection fraction)  Stable, chronic  Not overly volume overloaded, appears more pulmonary decompensation      GERD (gastroesophageal reflux disease)  Start PPI for ulcer prophylaxis given steroids      Type 2 diabetes mellitus without complication, without long-term current use of insulin  Diabetic diet  SSI   Maintain blood glucose log; titrate to effect        VTE Risk Mitigation (From admission, onward)           Ordered     Place sequential compression device  Until discontinued         01/17/25 0802     IP VTE LOW RISK PATIENT  Once         01/16/25 1442                    Discharge Planning   DERIC:      Code Status: Full Code   Medical Readiness for Discharge Date:   Discharge Plan A:  (tbd)                Please  place Justification for DME        Blanquita Hunt MD  Department of Hospital Medicine   [facility] - Telemetry

## 2025-01-19 NOTE — PROGRESS NOTES
[facility] Medicine  Progress Note    Patient Name: Emmanuel Grant  MRN: [id number]  Patient Class: IP- Inpatient   Admission Date: 1/16/2025  Length of Stay: 3 days  Attending Physician: Blanquita Hunt MD  Primary Care Provider: Wilfredo De Souza MD        Subjective     Principal Problem:Interstitial lung disease        HPI:    Emmanuel Grant is a 74 y.o. male who  has a past medical history of BPH, CAD, Chronic HFrEF, Hypertension, Kidney stone, Stroke, and Type 2 diabetes mellitus without complication, without long-term current use of insulin, presented to the ED with CC of SOB.     Patient has been SOB since 4am this morning and progressively worse. Did have URI symptoms for past 2-3 days. With intermittent fever/chills and cough. He is on home O2 at 2L,, but had to increase his home O2. In the ED, patient was decompensating and was nearly intubated. Given ativan on Bipap and was able to avoid intubation. Pulm consulted. Admitted to ICU. Maintaining his pH and labs unremarkable. COVID/FLU/RSV negative. BNP, trop, LA and procal all negative. HPI limited as patient is quite ill/tired on Bipap.     Overview/Hospital Course:  Admitted with AHRF 2/2 to ILD flare 2/2 suspected upper viral infection given symptoms prior to presentation. Steroids started. Pulm consulted. COVID/FLU/RSV negative. RIP pending.     No new subjective & objective note has been filed under this hospital service since the last note was generated.      Assessment and Plan     * Interstitial lung disease exacerbation  Admitted with ILD exacerbation to the ICU; step-down to telemetry status on 01/17  S/p BiPAP in the ICU; currently on 3 L N/C  CTA chest showed no evidence of infection but consistent with history of ILD  Viral respiratory pathogens negative  CellCept held on admission  S/p Solu-Medrol 60 Q8H; de-escalated to 60 Q12H today; deescalated to 60 mg QD on 1/19; with a plan to discharge on p.o. prednisone 60 mg  until seen in pulmonology Clinic [facility]  Outpatient follow-up for PFT and of have consideration; however family stated that the maybe moving him to [place]; discussed with case management  CT home CellCept  Palliative care consulted; input appreciated  Change Lipitor to Crestor the setting of a ILD      GERD with esophagitis  -patient complaining of nausea and epigastric fullness  -we will start Protonix 40 mg daily for prophylaxis while on steroids      Immunosuppression due to drug therapy  noted      Acute hypoxic respiratory failure  Patient with Hypoxic Respiratory failure which is Acute on chronic.    he is on home oxygen at 2 LPM. Supplemental oxygen was provided and noted- Oxygen Concentration (%):  [30] 3  Signs/symptoms of respiratory failure include- increased work of breathing and respiratory distress.   Contributing diagnoses includes - Interstitial lung disease Labs and images were reviewed.   Patient Has recent ABG, which has been reviewed.   Will treat underlying causes and adjust management of respiratory failure as follows- Treat for ILD exacerbation. Continue BiPAP for now.       Chronic HFrEF (heart failure with reduced ejection fraction)  Stable, chronic  Not overly volume overloaded, appears more pulmonary decompensation      GERD (gastroesophageal reflux disease)  Patient allergic to PPIs  -continue famotidine and simethicone      Type 2 diabetes mellitus without complication, without long-term current use of insulin  Diabetic diet  SSI   Maintain blood glucose log; titrate to effect        VTE Risk Mitigation (From admission, onward)           Ordered     Place sequential compression device  Until discontinued         01/17/25 0802     IP VTE LOW RISK PATIENT  Once         01/16/25 1442                    Discharge Planning   DERIC:      Code Status: Full Code   Medical Readiness for Discharge Date:   Discharge Plan A:  (tbd)                Please place Justification for SATHISH Juares  BREEZY Hunt MD  Department of Hospital Medicine   [facility] [place] - Telemetry

## 2025-01-19 NOTE — SUBJECTIVE & OBJECTIVE
Interval History: Pt has been weaned down to NC 3L, satting mid to high 90s. Pt reports some continued cough and SOB but remains improved from presentation. Discussed critical importance of outpatient follow up with patient and his son and the critical need for PFTs and too start Ofev as outpatient. No new issues.    Discussed case in detail with hospitalist especially outpatient plan.     Objective:     Vital Signs (Most Recent):  Temp: 97.5 °F (36.4 °C) (01/19/25 1122)  Pulse: 67 (01/19/25 1122)  Resp: 18 (01/19/25 1122)  BP: (!) 142/89 (01/19/25 1122)  SpO2: 100 % (01/19/25 1122) Vital Signs (24h Range):  Temp:  [97.5 °F (36.4 °C)-98.5 °F (36.9 °C)] 97.5 °F (36.4 °C)  Pulse:  [59-93] 67  Resp:  [18-20] 18  SpO2:  [98 %-100 %] 100 %  BP: (142-172)/(86-89) 142/89     Weight: 58 kg (127 lb 13.9 oz)  Body mass index is 19.44 kg/m².      Intake/Output Summary (Last 24 hours) at 1/19/2025 1339  Last data filed at 1/19/2025 0800  Gross per 24 hour   Intake 480 ml   Output 2780 ml   Net -2300 ml        Physical Exam  Vitals and nursing note reviewed.   Constitutional:       Appearance: He is ill-appearing.      Comments: Thin appearing male.   HENT:      Head: Normocephalic.      Nose: Nose normal.      Mouth/Throat:      Mouth: Mucous membranes are moist.   Eyes:      Extraocular Movements: Extraocular movements intact.   Cardiovascular:      Rate and Rhythm: Normal rate and regular rhythm.      Pulses: Normal pulses.      Heart sounds: Normal heart sounds.   Pulmonary:      Breath sounds: Rales present.      Comments: Increased WOB with prolonged conversation.  Decreased air movement bilaterally.  Abdominal:      General: Abdomen is flat. Bowel sounds are normal.      Palpations: Abdomen is soft.   Musculoskeletal:         General: Normal range of motion.      Cervical back: Normal range of motion.      Right lower leg: No edema.      Left lower leg: No edema.   Skin:     General: Skin is warm.   Neurological:       Mental Status: He is alert.           Review of Systems   Constitutional:  Negative for fever.   HENT:  Negative for congestion and rhinorrhea.    Eyes:  Negative for visual disturbance.   Respiratory:  Positive for cough, shortness of breath and wheezing.    Cardiovascular:  Negative for chest pain.   Gastrointestinal:  Negative for nausea and vomiting.   Genitourinary:  Negative for dysuria.   Musculoskeletal:  Negative for arthralgias.   Skin:  Negative for rash.   Neurological:  Negative for headaches.       Vents:  Oxygen Concentration (%): 32 (01/18/25 1253)    Lines/Drains/Airways       Peripheral Intravenous Line  Duration                  Peripheral IV - Single Lumen 01/16/25 1059 20 G Left Antecubital 3 days         Peripheral IV - Single Lumen 01/18/25 0618 22 G Left;Posterior Hand 1 day                    Significant Labs:    CBC/Anemia Profile:  Recent Labs   Lab 01/18/25  0530 01/19/25  0701   WBC 11.31 9.80   HGB 12.0* 12.9*   HCT 36.2* 39.1*    435   MCV 81* 82   RDW 12.9 12.7        Chemistries:  Recent Labs   Lab 01/18/25  0531 01/19/25  0701    137   K 4.0 3.6    98   CO2 26 28   BUN 23 23   CREATININE 0.8 1.0   CALCIUM 9.1 9.5   ALBUMIN 3.1* 3.2*   PROT 6.5 6.7   BILITOT 0.4 0.5   ALKPHOS 58 64   ALT 12 16   AST 12 13   MG 1.9 1.9   PHOS 3.4 3.2       All pertinent labs within the past 24 hours have been reviewed.    Significant Imaging:

## 2025-01-19 NOTE — NURSING
[facility]  Nurses Note -- 4 Eyes      1/19/2025       Skin assessed on: Q Shift      [x] No Pressure Injuries Present    [x]Prevention Measures Documented    [] Yes LDA  for Pressure Injury Previously documented     [] Yes New Pressure Injury Discovered   [] LDA for New Pressure Injury Added      Attending RN:  Leyla Menjivar, RN     Second :  Ro SARKAR LPN

## 2025-01-19 NOTE — ASSESSMENT & PLAN NOTE
Admitted with ILD exacerbation to the ICU; step-down to telemetry status on 01/17  S/p BiPAP in the ICU; currently on 3 L N/C  CTA chest showed no evidence of infection but consistent with history of ILD  Viral respiratory pathogens negative  CellCept held on admission  S/p Solu-Medrol 60 Q8H; de-escalated to 60 Q12H today; deescalated to 60 mg QD on 1/19; with a plan to discharge on p.o. prednisone 60 mg until seen in [facility]ology [facility]  Outpatient follow-up for PFT and of have consideration; however family stated that the maybe moving him to [place]; discussed with case management  CT home CellCept  Palliative care consulted; input appreciated  Change Lipitor to Crestor the setting of a ILD

## 2025-01-20 VITALS
HEIGHT: 68 IN | RESPIRATION RATE: 18 BRPM | TEMPERATURE: 97 F | OXYGEN SATURATION: 99 % | BODY MASS INDEX: 19.38 KG/M2 | DIASTOLIC BLOOD PRESSURE: 80 MMHG | SYSTOLIC BLOOD PRESSURE: 143 MMHG | HEART RATE: 89 BPM | WEIGHT: 127.88 LBS

## 2025-01-20 LAB
ALBUMIN SERPL BCP-MCNC: 3 G/DL (ref 3.5–5.2)
ALP SERPL-CCNC: 56 U/L (ref 40–150)
ALT SERPL W/O P-5'-P-CCNC: 20 U/L (ref 10–44)
ANION GAP SERPL CALC-SCNC: 8 MMOL/L (ref 8–16)
AST SERPL-CCNC: 15 U/L (ref 10–40)
BACTERIA BLD CULT: NORMAL
BACTERIA BLD CULT: NORMAL
BASOPHILS # BLD AUTO: 0.01 K/UL (ref 0–0.2)
BASOPHILS NFR BLD: 0.1 % (ref 0–1.9)
BILIRUB SERPL-MCNC: 0.4 MG/DL (ref 0.1–1)
BUN SERPL-MCNC: 21 MG/DL (ref 8–23)
CALCIUM SERPL-MCNC: 9.2 MG/DL (ref 8.7–10.5)
CHLORIDE SERPL-SCNC: 99 MMOL/L (ref 95–110)
CO2 SERPL-SCNC: 32 MMOL/L (ref 23–29)
CREAT SERPL-MCNC: 0.9 MG/DL (ref 0.5–1.4)
DIFFERENTIAL METHOD BLD: ABNORMAL
ENA JO1 IGG SER-ACNC: <0.2 U
EOSINOPHIL # BLD AUTO: 0.2 K/UL (ref 0–0.5)
EOSINOPHIL NFR BLD: 2 % (ref 0–8)
ERYTHROCYTE [DISTWIDTH] IN BLOOD BY AUTOMATED COUNT: 12.8 % (ref 11.5–14.5)
EST. GFR  (NO RACE VARIABLE): >60 ML/MIN/1.73 M^2
GLUCOSE SERPL-MCNC: 157 MG/DL (ref 70–110)
HCT VFR BLD AUTO: 43.3 % (ref 40–54)
HGB BLD-MCNC: 14.5 G/DL (ref 14–18)
IMM GRANULOCYTES # BLD AUTO: 0.04 K/UL (ref 0–0.04)
IMM GRANULOCYTES NFR BLD AUTO: 0.4 % (ref 0–0.5)
LYMPHOCYTES # BLD AUTO: 1.8 K/UL (ref 1–4.8)
LYMPHOCYTES NFR BLD: 17.4 % (ref 18–48)
MCH RBC QN AUTO: 27.2 PG (ref 27–31)
MCHC RBC AUTO-ENTMCNC: 33.5 G/DL (ref 32–36)
MCV RBC AUTO: 81 FL (ref 82–98)
MONOCYTES # BLD AUTO: 1.2 K/UL (ref 0.3–1)
MONOCYTES NFR BLD: 11.2 % (ref 4–15)
NEUTROPHILS # BLD AUTO: 7.1 K/UL (ref 1.8–7.7)
NEUTROPHILS NFR BLD: 68.9 % (ref 38–73)
NRBC BLD-RTO: 0 /100 WBC
PLATELET # BLD AUTO: 460 K/UL (ref 150–450)
PMV BLD AUTO: 9.7 FL (ref 9.2–12.9)
POCT GLUCOSE: 166 MG/DL (ref 70–110)
POCT GLUCOSE: 318 MG/DL (ref 70–110)
POTASSIUM SERPL-SCNC: 3.5 MMOL/L (ref 3.5–5.1)
PROT SERPL-MCNC: 6.3 G/DL (ref 6–8.4)
RBC # BLD AUTO: 5.33 M/UL (ref 4.6–6.2)
SODIUM SERPL-SCNC: 139 MMOL/L (ref 136–145)
WBC # BLD AUTO: 10.27 K/UL (ref 3.9–12.7)

## 2025-01-20 PROCEDURE — 97161 PT EVAL LOW COMPLEX 20 MIN: CPT | Mod: HCNC

## 2025-01-20 PROCEDURE — 85025 COMPLETE CBC W/AUTO DIFF WBC: CPT | Mod: HCNC | Performed by: STUDENT IN AN ORGANIZED HEALTH CARE EDUCATION/TRAINING PROGRAM

## 2025-01-20 PROCEDURE — 94761 N-INVAS EAR/PLS OXIMETRY MLT: CPT | Mod: HCNC

## 2025-01-20 PROCEDURE — 25000003 PHARM REV CODE 250: Mod: HCNC | Performed by: STUDENT IN AN ORGANIZED HEALTH CARE EDUCATION/TRAINING PROGRAM

## 2025-01-20 PROCEDURE — 99900035 HC TECH TIME PER 15 MIN (STAT): Mod: HCNC

## 2025-01-20 PROCEDURE — 97535 SELF CARE MNGMENT TRAINING: CPT | Mod: HCNC

## 2025-01-20 PROCEDURE — 36415 COLL VENOUS BLD VENIPUNCTURE: CPT | Mod: HCNC | Performed by: STUDENT IN AN ORGANIZED HEALTH CARE EDUCATION/TRAINING PROGRAM

## 2025-01-20 PROCEDURE — 25000003 PHARM REV CODE 250: Mod: HCNC

## 2025-01-20 PROCEDURE — 97530 THERAPEUTIC ACTIVITIES: CPT | Mod: HCNC

## 2025-01-20 PROCEDURE — 97165 OT EVAL LOW COMPLEX 30 MIN: CPT | Mod: HCNC

## 2025-01-20 PROCEDURE — 25000003 PHARM REV CODE 250: Mod: HCNC | Performed by: CLINIC/CENTER

## 2025-01-20 PROCEDURE — 27000221 HC OXYGEN, UP TO 24 HOURS: Mod: HCNC

## 2025-01-20 PROCEDURE — 80053 COMPREHEN METABOLIC PANEL: CPT | Mod: HCNC | Performed by: STUDENT IN AN ORGANIZED HEALTH CARE EDUCATION/TRAINING PROGRAM

## 2025-01-20 PROCEDURE — 63600175 PHARM REV CODE 636 W HCPCS: Mod: JZ,TB,HCNC | Performed by: INTERNAL MEDICINE

## 2025-01-20 RX ORDER — PREDNISONE 20 MG/1
60 TABLET ORAL DAILY
Qty: 90 TABLET | Refills: 0 | Status: SHIPPED | OUTPATIENT
Start: 2025-01-20 | End: 2025-02-19

## 2025-01-20 RX ORDER — ROSUVASTATIN CALCIUM 20 MG/1
20 TABLET, COATED ORAL DAILY
Qty: 90 TABLET | Refills: 3 | Status: SHIPPED | OUTPATIENT
Start: 2025-01-20 | End: 2026-01-20

## 2025-01-20 RX ADMIN — SIMETHICONE 80 MG: 80 TABLET, CHEWABLE ORAL at 01:01

## 2025-01-20 RX ADMIN — SERTRALINE HYDROCHLORIDE 25 MG: 25 TABLET ORAL at 09:01

## 2025-01-20 RX ADMIN — MYCOPHENOLATE MOFETIL 1000 MG: 250 CAPSULE ORAL at 09:01

## 2025-01-20 RX ADMIN — METOPROLOL SUCCINATE 25 MG: 25 TABLET, EXTENDED RELEASE ORAL at 09:01

## 2025-01-20 RX ADMIN — INSULIN ASPART 5 UNITS: 100 INJECTION, SOLUTION INTRAVENOUS; SUBCUTANEOUS at 12:01

## 2025-01-20 RX ADMIN — SIMETHICONE 80 MG: 80 TABLET, CHEWABLE ORAL at 10:01

## 2025-01-20 RX ADMIN — POTASSIUM BICARBONATE 50 MEQ: 977.5 TABLET, EFFERVESCENT ORAL at 10:01

## 2025-01-20 RX ADMIN — ASPIRIN 81 MG CHEWABLE TABLET 81 MG: 81 TABLET CHEWABLE at 09:01

## 2025-01-20 RX ADMIN — INSULIN ASPART 5 UNITS: 100 INJECTION, SOLUTION INTRAVENOUS; SUBCUTANEOUS at 09:01

## 2025-01-20 RX ADMIN — INSULIN GLARGINE 15 UNITS: 100 INJECTION, SOLUTION SUBCUTANEOUS at 09:01

## 2025-01-20 RX ADMIN — ACETAMINOPHEN 650 MG: 325 TABLET ORAL at 01:01

## 2025-01-20 RX ADMIN — FAMOTIDINE 20 MG: 20 TABLET ORAL at 09:01

## 2025-01-20 RX ADMIN — METHYLPREDNISOLONE SODIUM SUCCINATE 60 MG: 40 INJECTION, POWDER, FOR SOLUTION INTRAMUSCULAR; INTRAVENOUS at 09:01

## 2025-01-20 RX ADMIN — SIMETHICONE 80 MG: 80 TABLET, CHEWABLE ORAL at 09:01

## 2025-01-20 RX ADMIN — GUAIFENESIN AND DEXTROMETHORPHAN HYDROBROMIDE 1 TABLET: 600; 30 TABLET, EXTENDED RELEASE ORAL at 09:01

## 2025-01-20 NOTE — PLAN OF CARE
Problem: Physical Therapy  Goal: Physical Therapy Goal  Outcome: Met     Pt ambulated ~500 ft with supervision using no AD and on 3L O2 NC.  Pt can be D/C'ed home with family support.  Pt to continue ambulating with nursing supervision while in the hospital.  No further therapy indicated.

## 2025-01-20 NOTE — PLAN OF CARE
Case Management Final Discharge Note      Discharge Disposition: Home    New DME ordered / company name: None    Relevant SDOH / Transition of Care Barriers:  None identified    Person available to provide assistance at home when needed and their contact information: Emmanuel Grant Jr. [phone]    Scheduled followup appointment: PCP 1/27    Referrals placed: pulmonary rehab, internal medicine    Transportation: private vehicle      Patient and family educated on discharge services and updated on DC plan. Bedside RN notified, patient clear to discharge from Case Management Perspective.      01/20/25 1132   Final Note   Assessment Type Final Discharge Note   Anticipated Discharge Disposition Home   Hospital Resources/Appts/Education Provided Appointments scheduled and added to AVS   Post-Acute Status   Discharge Delays None known at this time

## 2025-01-20 NOTE — PT/OT/SLP EVAL
Occupational Therapy   Evaluation and Discharge Note    Name: Emmanuel Grant  MRN: [id number]  Admitting Diagnosis: Interstitial lung disease  Recent Surgery: * No surgery found *      Recommendations:     Discharge Recommendations: No Therapy Indicated  Discharge Equipment Recommendations: none  Barriers to discharge:  None    Assessment:     Emmanuel Grant is a 74 y.o. male with a medical diagnosis of Interstitial lung disease.  OT eval is complete. The patient is able to perform self care and functional mobility with (S). No OT or DME is recommended.    The patient was educated re: the benefits of sitting in the chair and requesting nursing assist to obtain oxygen to amb to the bathroom.     Plan:     During this hospitalization, patient does not require further acute OT services.  Please re-consult if situation changes.    Plan of Care Reviewed with: patient    Subjective     Chief Complaint: none  Patient/Family Comments/goals: wants to remain (I)    Occupational Profile:  Living Environment: lives alone in a mobile home with 3 MG and B HR  Previous level of function: (I) with ADL and with no AD  Roles and Routines: drives, exercises does home management  Equipment Used at home: oxygen  Assistance upon Discharge: son and brother checks on his daily, son in [place] and 2 sons in [place]    Pain/Comfort:  Pain Rating 1: 0/10    Patients cultural, spiritual, Worship conflicts given the current situation: no    Objective:     Communicated with: nurse prior to session.  Patient found HOB elevated with peripheral IV, telemetry, oxygen upon OT entry to room.    General Precautions: Standard, fall, respiratory, diabetic  Orthopedic Precautions: N/A  Braces: N/A  Respiratory Status: Room air     Occupational Performance:    Bed Mobility:    Patient completed Scooting/Bridging with stand by assistance  Patient completed Supine to Sit with stand by assistance    Functional Mobility/Transfers:  Patient completed Sit <> Stand  Transfer with supervision  with  no assistive device   Patient completed Bed <> Chair Transfer using Step Transfer technique with supervision with no assistive device  Functional Mobility: The patient amb without AD to the toilet and sink with (S). The patient amb in the hallway with (S) with 3L NC and SpO2 92-97% and -127    Activities of Daily Living:  Grooming: supervision to stand at the sink to wash hands  Upper Body Dressing: contact guard assistance    Lower Body Dressing: stand by assistance /CGA to don B shoes while seated on the EOB and dep to tie B shoes  Toileting: modified independence and supervision      Cognitive/Visual Perceptual:  Cognitive/Psychosocial Skills:     -       Oriented to: Person, Place, Time, and Situation   -       Follows Commands/attention:Follows multistep  commands  -       Communication: clear/fluent  -       Memory: No Deficits noted  -       Safety awareness/insight to disability: intact   -       Mood/Affect/Coping skills/emotional control: Appropriate to situation  Visual/Perceptual:      -Intact      Physical Exam:  Balance: -       good  Postural examination/scapula alignment:    -       No postural abnormalities identified  Skin integrity: Visible skin intact  Edema:  None noted  Sensation:    -       Intact  Upper Extremity Range of Motion:     -       Right Upper Extremity: WFL  -       Left Upper Extremity: WFL  Upper Extremity Strength:    -       Right Upper Extremity: WFL  -       Left Upper Extremity: WFL    AMPAC 6 Click ADL:  AMPAC Total Score: 23    Treatment & Education:  Performed self care and functional mobility as noted above   Monitored vitals with SpO2 92-97% and -127 during activity  Educated the patient re: benefits of sitting in the chair and need to request nursing assist to amb and obtain oxygen extender to amb to the bathroom    Patient left up in chair with all lines intact, call button in reach, and nurse notified    GOALS:    Multidisciplinary Problems       Occupational Therapy Goals       Not on file              Multidisciplinary Problems (Resolved)          Problem: Occupational Therapy    Goal Priority Disciplines Outcome Interventions   Occupational Therapy Goal   (Resolved)     OT, PT/OT Met                        DME Justifications:  No DME recommended requiring DME justifications    History:     Past Medical History:   Diagnosis Date    BPH (benign prostatic hyperplasia) 02/28/2024    CAD (coronary artery disease)     Sees [facility], h/o stent    Chronic HFrEF (heart failure with reduced ejection fraction) 05/01/2024    Diabetes mellitus     Hypertension     Kidney stone     Stroke     age 62    Type 2 diabetes mellitus without complication, without long-term current use of insulin 10/08/2021         Past Surgical History:   Procedure Laterality Date    24 HOUR IMPEDANCE PH MONITORING OF ESOPHAGUS IN PATIENT NOT TAKING ACID REDUCING MEDICATIONS N/A 11/13/2024    Procedure: IMPEDANCE PH STUDY, ESOPHAGEAL, 24 HOUR, IN PATIENT NOT TAKING ACID REDUCING MEDICATION;  Surgeon: Stephany Mendoza MD;  Location: [facility] (4TH FLR);  Service: Endoscopy;  Laterality: N/A;  referral dr miguel/ off ppi / prep inst sent to pt via mail  [facility]. Unable to [facility]-  11/8 - pt not called, per chart review patient currently inpatient at [place] [id number]    CARDIAC CATHETERIZATION      with stent    COLONOSCOPY N/A 03/27/2024    Procedure: COLONOSCOPY;  Surgeon: Ariela Castro MD;  Location: [facility];  Service: Endoscopy;  Laterality: N/A;    ESOPHAGEAL MANOMETRY WITH MEASUREMENT OF IMPEDANCE N/A 11/13/2024    Procedure: MANOMETRY, ESOPHAGUS, WITH IMPEDANCE MEASUREMENT;  Surgeon: Stephany Mendoza MD;  Location: [facility] (4TH FLR);  Service: Endoscopy;  Laterality: N/A;    ESOPHAGOGASTRODUODENOSCOPY N/A 03/27/2024    Procedure: EGD (ESOPHAGOGASTRODUODENOSCOPY);  Surgeon: Ariela Castro MD;  Location: [facility]  ENDO;  Service: Endoscopy;  Laterality: N/A;    LEFT HEART CATHETERIZATION Left 9/10/2024    Procedure: Left heart cath;  Surgeon: Jemal Drummond MD;  Location: [facility] CATH LAB;  Service: Cardiology;  Laterality: Left;    SHOULDER SURGERY Right    3    Time Tracking:     OT Date of Treatment: 01/20/25  OT Start Time: 0943  OT Stop Time: 1004  OT Total Time (min): 21 min    Billable Minutes:Evaluation 13 (with PT_)  Self Care/Home Management 8  Total Time 21    1/20/2025

## 2025-01-20 NOTE — PROGRESS NOTES
Patient was concerned that indigestion was actually chest pain since he had stent placement in the past. EKG done after reaching to provider about patient's concern.

## 2025-01-20 NOTE — DISCHARGE INSTRUCTIONS
Long-Acting Insulin: This insulin is taken once or twice daily   Insulin Names: Levemir, Lantus, Toujeo, Basaglar, Tresiba      Insulin continues working for 20-24 hours.    Take insulin at the same time each day or if taking twice daily, schedule every 12 hours.   You DO NOT have to eat when this type of insulin is taken.    DO NOT SKIP YOUR INSULIN SHOTS.    You should use the same dose every day.        Our goal at [facility] is to always give you outstanding care and exceptional service. You may receive a survey by mail, text or e-mail in the next 7-10 days from Lucinda Chapman and our leadership team asking about the care you received with us. The survey should only take 5-10 minutes to complete and is very important to us.     Your feedback provides us with a way to recognize our staff who work tirelessly to provide the best care! Also, your responses help us learn how to improve when your experience was below our aspiration of excellence. We WILL use your feedback to continue making improvements to help us provide the highest quality care. We keep your personal information and feedback confidential. We appreciate your time completing this survey and can't wait to hear from you!!!     We want you to leave us today feeling like you can DEFINITELY recommend us to others! We look forward to your continued care with us! Thanks so much for choosing [facility] for your healthcare needs!

## 2025-01-20 NOTE — PT/OT/SLP EVAL
Physical Therapy Evaluation and Discharge Note    Patient Name:  Emmanuel Grant   MRN:  [id number]    Recommendations:     Discharge Recommendations: No Therapy Indicated  Discharge Equipment Recommendations: none   Barriers to discharge: None    Assessment:     Emmanuel Grant is a 74 y.o. male admitted with a medical diagnosis of Interstitial lung disease.  At this time, patient is functioning at their prior level of function and does not require further acute PT services.     Recent Surgery: * No surgery found *      Plan:     During this hospitalization, patient does not require further acute PT services.  Please re-consult if situation changes.      Subjective     Chief Complaint: N/A  Patient/Family Comments/goals: Pt agreeable to therapy.  Pain/Comfort:  Pain Rating 1: 0/10      Living Environment:  Pt lives alone in a mobile home with ~5 MG and B HR at entry.   Prior to admission, patients level of function was mod I with ambulation using home O2 at 3L.  Pt was driving PTA, enjoys exercising.  Equipment used at home: oxygen.  Upon discharge, patient will have assistance from son and brother.  Pt has other sons in [place] and [place].    Objective:     Patient found HOB elevated with peripheral IV, telemetry, oxygen upon PT entry to room.    General Precautions: Standard, fall, diabetic, respiratory, [place]    Orthopedic Precautions:N/A   Braces: N/A  Respiratory Status: Nasal cannula, flow 3 L/min    Exams:  Cognitive Exam:  Patient was able to follow multiple commands.   Gross Motor Coordination:  WFL  Postural Exam:  Patient presented with the following abnormalities:    -       Rounded shoulders  -       Forward head  Sensation:    -       Intact  light/touch BLE  Skin Integrity/Edema:      -       Skin integrity: Visible skin intact  -       Edema: None noted BLE  BLE ROM: WFL  BLE Strength: WFL    Functional Mobility:  Bed Mobility:     Scooting: modified independence  Supine to Sit: modified  independence  Transfers:     Sit to Stand: modified independence with no AD  Bed to Chair: supervision with  no AD  using  Step Transfer  Gait: Pt ambulated ~500 ft with supervision using no AD, wearing B tennis shoes, and on 3L O2 NC.  spO2 on 3L ~92-96% and HR ~107-127 bpm.  Pt with decreased alina.   Balance: Pt with fair dynamic standing balance.     AM-PAC 6 CLICK MOBILITY  Total Score:23       Treatment and Education:  Pt encouraged OOB>chair while in the hospital.  Pt verbalized good understanding.       Patient left up in chair with all lines intact and call button in reach.  Tray table close by.     GOALS:   Multidisciplinary Problems       Physical Therapy Goals       Not on file              Multidisciplinary Problems (Resolved)          Problem: Physical Therapy    Goal Priority Disciplines Outcome Interventions   Physical Therapy Goal   (Resolved)     PT, PT/OT Met                        History:     Past Medical History:   Diagnosis Date    BPH (benign prostatic hyperplasia) 02/28/2024    CAD (coronary artery disease)     Sees [facility], h/o stent    Chronic HFrEF (heart failure with reduced ejection fraction) 05/01/2024    Diabetes mellitus     Hypertension     Kidney stone     Stroke     age 62    Type 2 diabetes mellitus without complication, without long-term current use of insulin 10/08/2021       Past Surgical History:   Procedure Laterality Date    24 HOUR IMPEDANCE PH MONITORING OF ESOPHAGUS IN PATIENT NOT TAKING ACID REDUCING MEDICATIONS N/A 11/13/2024    Procedure: IMPEDANCE PH STUDY, ESOPHAGEAL, 24 HOUR, IN PATIENT NOT TAKING ACID REDUCING MEDICATION;  Surgeon: Stephany Mendoza MD;  Location: [facility] ([address] [place] [address]);  Service: Endoscopy;  Laterality: N/A;  referral dr miguel/ off ppi / prep inst sent to pt via mail  11/6- precall attempted no answer. Unable to [facility]-  11/8 - pt not called, per chart review patient currently inpatient at [place] [address]    CARDIAC CATHETERIZATION      with stent     COLONOSCOPY N/A 03/27/2024    Procedure: COLONOSCOPY;  Surgeon: Ariela Castro MD;  Location: [facility];  Service: Endoscopy;  Laterality: N/A;    ESOPHAGEAL MANOMETRY WITH MEASUREMENT OF IMPEDANCE N/A 11/13/2024    Procedure: MANOMETRY, ESOPHAGUS, WITH IMPEDANCE MEASUREMENT;  Surgeon: Stephany Mendoza MD;  Location[facility] ([address] [place] [address]);  Service: Endoscopy;  Laterality: N/A;    ESOPHAGOGASTRODUODENOSCOPY N/A 03/27/2024    Procedure: EGD (ESOPHAGOGASTRODUODENOSCOPY);  Surgeon: Ariela Castro MD;  Location: [facility];  Service: Endoscopy;  Laterality: N/A;    LEFT HEART CATHETERIZATION Left 9/10/2024    Procedure: Left heart cath;  Surgeon: Jemal Drummond MD;  Location: [facility] CATH LAB;  Service: Cardiology;  Laterality: Left;    SHOULDER SURGERY Right        Time Tracking:     PT Received On: 01/20/25  PT Start Time: 0943     PT Stop Time: 1004  PT Total Time (min): 21 min     Billable Minutes: Evaluation 13 min co-eval with OT and Therapeutic Activity 8 min      01/20/2025

## 2025-01-20 NOTE — PLAN OF CARE
Problem: Occupational Therapy  Goal: Occupational Therapy Goal  Outcome: Met   OT eval is complete. The patient is able to perform self care and functional mobility with (S). No OT or DME is recommended.    The patient was educated re: the benefits of sitting in the chair and requesting nursing assist to obtain oxygen to amb to the bathroom.

## 2025-01-20 NOTE — PROGRESS NOTES
[facility] [place]  Nurses Note -- 4 Eyes      1/20/2025       Skin assessed on: Q Shift      [x] No Pressure Injuries Present    []Prevention Measures Documented    [] Yes LDA  for Pressure Injury Previously documented     [] Yes New Pressure Injury Discovered   [] LDA for New Pressure Injury Added      Attending RN:  Mando Mccurdy RN     Second RN:  Doris Langford LPN

## 2025-01-20 NOTE — PLAN OF CARE
01/20/25 1209   Medicare Message   Important Message from Medicare regarding Discharge Appeal Rights Given to patient/caregiver;Explained to patient/caregiver;Other (comments)  (CM spoke with patient's via hospital room phone, [phone], verbalized understanding.)   Date IMM was signed 01/20/25   Time IMM was signed 1205     Copy mailed to address in chart. [id number] [phone]

## 2025-01-20 NOTE — NURSING
Patient is currently resting in room. He had minimal coughing throughout the night. He was free from pain and blood sugars are elevated due to the steroids. His bed is in the lowest position and all safety needs are being met.

## 2025-01-20 NOTE — NURSING
[facility] [place]es [place] -- 4 Eyes      1/19/2025       Skin assessed on: Q Shift      [x] No Pressure Injuries Present    [x]Prevention Measures Documented    [] Yes LDA  for Pressure Injury Previously documented     [] Yes New Pressure Injury Discovered   [] LDA for New Pressure Injury Added      Attending RN:  Doris Langford LPN     Second RN:  Leyla CHAU

## 2025-01-20 NOTE — PROGRESS NOTES
AVS virtually reviewed with Emmanuel Grant in its entirety with emphasis on diet, medications, follow-up appointments and reasons to return to the ED or contact the [facility] On [facility] Nurse Care Line. Patient also encouraged to utilize their patient portal. Ease and convenience of use reiterated. Education complete and patient voiced understanding. All questions answered. Discharge teaching complete.

## 2025-01-22 ENCOUNTER — PATIENT OUTREACH (OUTPATIENT)
Dept: ADMINISTRATIVE | Facility: CLINIC | Age: 75
End: 2025-01-22
Payer: MEDICARE

## 2025-01-22 NOTE — PROGRESS NOTES
C3 nurse attempted to contact Emmanuel Grant for a TCC post hospital discharge follow up call. No answer. No voicemail available. The patient has a scheduled HOSFU appointment with Corwin Yañez NP  on 01/27/25 @ 4233.

## 2025-01-23 NOTE — PROGRESS NOTES
3rd Attempt made to reach patient for TCC call. Left message with pt's son for pt to  please call [phone] leave first name, last name, and  for Husam.  I will return your call.

## 2025-01-24 DIAGNOSIS — J96.11 CHRONIC HYPOXEMIC RESPIRATORY FAILURE: Primary | ICD-10-CM

## 2025-01-24 LAB
ANA SER QL IF: NORMAL
ANTI-SSA ANTIBODY: 0.04 RATIO (ref 0–0.99)
ANTI-SSA INTERPRETATION: NEGATIVE
ANTI-SSB ANTIBODY: 0.07 RATIO (ref 0–0.99)
ANTI-SSB INTERPRETATION: NEGATIVE

## 2025-01-24 NOTE — PROGRESS NOTES
"5th attempt to reach patient in response to message left in TCM box. No answer after several rings and no voicemail options. Called and spoke with pt's son Emmanuel GrantJr to inform him that patient sounded very SOB at time he left message in TCM box. I advised him to check on his father and he states "I just got off the phone with him, he has been sounding like that." Advised pt's son that if breathing worsens, to proceed to ER. Verbalized understanding.     "

## 2025-01-24 NOTE — PROGRESS NOTES
4th Attempt made to reach patient for TCC call in response to message left in TCM box. No answer after several rings, no voicemail options available.

## 2025-01-27 ENCOUNTER — TELEPHONE (OUTPATIENT)
Dept: ENDOCRINOLOGY | Facility: CLINIC | Age: 75
End: 2025-01-27
Payer: MEDICARE

## 2025-01-27 ENCOUNTER — OFFICE VISIT (OUTPATIENT)
Dept: FAMILY MEDICINE | Facility: CLINIC | Age: 75
End: 2025-01-27
Payer: MEDICARE

## 2025-01-27 VITALS
HEIGHT: 68 IN | DIASTOLIC BLOOD PRESSURE: 78 MMHG | BODY MASS INDEX: 19.84 KG/M2 | HEART RATE: 104 BPM | TEMPERATURE: 98 F | SYSTOLIC BLOOD PRESSURE: 128 MMHG | OXYGEN SATURATION: 96 % | WEIGHT: 130.94 LBS | RESPIRATION RATE: 19 BRPM

## 2025-01-27 DIAGNOSIS — K21.9 GASTROESOPHAGEAL REFLUX DISEASE WITHOUT ESOPHAGITIS: ICD-10-CM

## 2025-01-27 DIAGNOSIS — E11.3291 TYPE 2 DIABETES MELLITUS WITH RIGHT EYE AFFECTED BY MILD NONPROLIFERATIVE RETINOPATHY WITHOUT MACULAR EDEMA, WITH LONG-TERM CURRENT USE OF INSULIN: ICD-10-CM

## 2025-01-27 DIAGNOSIS — Z79.4 TYPE 2 DIABETES MELLITUS WITH RIGHT EYE AFFECTED BY MILD NONPROLIFERATIVE RETINOPATHY WITHOUT MACULAR EDEMA, WITH LONG-TERM CURRENT USE OF INSULIN: ICD-10-CM

## 2025-01-27 DIAGNOSIS — I50.22 CHRONIC HFREF (HEART FAILURE WITH REDUCED EJECTION FRACTION): ICD-10-CM

## 2025-01-27 DIAGNOSIS — J96.11 CHRONIC HYPOXEMIC RESPIRATORY FAILURE: Primary | ICD-10-CM

## 2025-01-27 PROCEDURE — 99214 OFFICE O/P EST MOD 30 MIN: CPT | Mod: S$GLB,,, | Performed by: NURSE PRACTITIONER

## 2025-01-27 PROCEDURE — 3008F BODY MASS INDEX DOCD: CPT | Mod: CPTII,S$GLB,, | Performed by: NURSE PRACTITIONER

## 2025-01-27 PROCEDURE — 1126F AMNT PAIN NOTED NONE PRSNT: CPT | Mod: CPTII,S$GLB,, | Performed by: NURSE PRACTITIONER

## 2025-01-27 PROCEDURE — 1101F PT FALLS ASSESS-DOCD LE1/YR: CPT | Mod: CPTII,S$GLB,, | Performed by: NURSE PRACTITIONER

## 2025-01-27 PROCEDURE — 3051F HG A1C>EQUAL 7.0%<8.0%: CPT | Mod: CPTII,S$GLB,, | Performed by: NURSE PRACTITIONER

## 2025-01-27 PROCEDURE — 1111F DSCHRG MED/CURRENT MED MERGE: CPT | Mod: CPTII,S$GLB,, | Performed by: NURSE PRACTITIONER

## 2025-01-27 PROCEDURE — 1159F MED LIST DOCD IN RCRD: CPT | Mod: CPTII,S$GLB,, | Performed by: NURSE PRACTITIONER

## 2025-01-27 PROCEDURE — 99999 PR PBB SHADOW E&M-EST. PATIENT-LVL V: CPT | Mod: PBBFAC,,, | Performed by: NURSE PRACTITIONER

## 2025-01-27 PROCEDURE — G2211 COMPLEX E/M VISIT ADD ON: HCPCS | Mod: S$GLB,,, | Performed by: NURSE PRACTITIONER

## 2025-01-27 PROCEDURE — 3078F DIAST BP <80 MM HG: CPT | Mod: CPTII,S$GLB,, | Performed by: NURSE PRACTITIONER

## 2025-01-27 PROCEDURE — 3288F FALL RISK ASSESSMENT DOCD: CPT | Mod: CPTII,S$GLB,, | Performed by: NURSE PRACTITIONER

## 2025-01-27 PROCEDURE — 1160F RVW MEDS BY RX/DR IN RCRD: CPT | Mod: CPTII,S$GLB,, | Performed by: NURSE PRACTITIONER

## 2025-01-27 PROCEDURE — 3074F SYST BP LT 130 MM HG: CPT | Mod: CPTII,S$GLB,, | Performed by: NURSE PRACTITIONER

## 2025-01-27 RX ORDER — FAMOTIDINE 20 MG/1
20 TABLET, FILM COATED ORAL 2 TIMES DAILY PRN
Qty: 60 TABLET | Refills: 1 | Status: SHIPPED | OUTPATIENT
Start: 2025-01-27 | End: 2026-01-27

## 2025-01-27 RX ORDER — INSULIN GLARGINE 100 [IU]/ML
15 INJECTION, SOLUTION SUBCUTANEOUS DAILY
Qty: 9 ML | Refills: 0 | Status: SHIPPED | OUTPATIENT
Start: 2025-01-27 | End: 2025-01-28 | Stop reason: ALTCHOICE

## 2025-01-27 NOTE — TELEPHONE ENCOUNTER
Spoke with patient. Verbalized understanding to NP message. Patient was rescheduled to see Malika Calderon NP in clinic on 01/28/2025 at 0930. Patient verbalize understanding.   yes/cane

## 2025-01-27 NOTE — TELEPHONE ENCOUNTER
----- Message from Malika Calderon NP sent at 1/27/2025  1:49 PM CST -----  Please schedule pt earlier f/u appt. Ok to override. He has BGs spiking to 400s.

## 2025-01-27 NOTE — PROGRESS NOTES
Routine Office Visit    Patient Name: Emmanuel Grant    : 1950  MRN: [id number]    Chief Complaint:  Hospital follow-up    Subjective:  Emmanuel is a 74 y.o. male who presents today for:    Hospital follow-up - patient who is well known to me reports today for evaluation.  Here for hospital follow-up after being admitted for respiratory failure requiring steroids again.  Since discharge he has been taking the prednisone as directed but feels his shortness breath is not really improving.  He has points throughout the day where he is not short of breath but gets short of breath randomly which is scary for him.  He does have GERD intermittently which he states may be contributing somewhat to the symptoms.  He takes sucralfate for this.  No chest pain or leg swelling.  He is on 3 L nasal cannula today.    He reports mentally he is doing well and is optimistic that he will improve.  He was prescribed sertraline before but feels like he does not need this.  He has 3 sons that help with his care, 2 that live in [place] and 1 that lives in [place].  He states he feels his energy is returning back to normal since leaving the hospital.    Since being on the prednisone again, his blood sugars are elevated.  He has his Dexcom on and his blood sugar is above 400 today.  He reports compliance with his Lantus insulin.  He has an appointment with endocrinology but not until May.  He will be seeing pulmonology next week with plans for PFTs before.    Past Medical History  Past Medical History:   Diagnosis Date    BPH (benign prostatic hyperplasia) 2024    CAD (coronary artery disease)     Sees [facility], h/o stent    Chronic HFrEF (heart failure with reduced ejection fraction) 2024    Diabetes mellitus     Hypertension     Kidney stone     Stroke     age 62    Type 2 diabetes mellitus without complication, without long-term current use of insulin 10/08/2021       Family History  Family History   Problem  Relation Name Age of Onset    Cancer Mother      Colon cancer Sister      Esophageal cancer Neg Hx         Current Medications  Current Outpatient Medications on File Prior to Visit   Medication Sig Dispense Refill    albuterol-ipratropium (DUO-NEB) 2.5 mg-0.5 mg/3 mL nebulizer solution Take 3 mLs by nebulization every 6 (six) hours as needed for Wheezing. Rescue 75 mL 11    amLODIPine (NORVASC) 10 MG tablet Take 1 tablet (10 mg total) by mouth once daily. 90 tablet 3    arformoteroL (BROVANA) 15 mcg/2 mL Nebu Take 2 mLs (15 mcg total) by nebulization 2 (two) times daily. Controller 120 mL 11    aspirin 81 MG Chew Take 81 mg by mouth once daily.      blood-glucose sensor (FREESTYLE RAMBO 3 PLUS SENSOR) Blanche Change every 15 days 6 each 3    budesonide (PULMICORT) 0.5 mg/2 mL nebulizer solution Take 4 mLs (1 mg total) by nebulization 2 (two) times daily. Controller 240 mL 11    cholecalciferol, vitamin D3, (VITAMIN D3) 50 mcg (2,000 unit) Cap capsule Take 1 capsule by mouth once daily.      furosemide (LASIX) 20 MG tablet Take 1 tablet (20 mg total) by mouth once daily. 90 tablet 3    latanoprost, PF, 0.005 % Drop 1 drop into affected eye in the evening Ophthalmic Once a day      metFORMIN (GLUCOPHAGE) 1000 MG tablet Take 1 tablet (1,000 mg total) by mouth 2 (two) times daily with meals.      metoprolol succinate (TOPROL-XL) 50 MG 24 hr tablet Take 1 tablet (50 mg total) by mouth once daily. 90 tablet 0    mirabegron (MYRBETRIQ) 25 mg Tb24 ER tablet Take 1 tablet (25 mg total) by mouth once daily. 30 tablet 11    mycophenolate (CELLCEPT) 250 mg Cap Take 4 capsules (1,000 mg total) by mouth 2 (two) times daily. 240 capsule 11    nitroGLYCERIN (NITROSTAT) 0.3 MG SL tablet Place 1 tablet (0.3 mg total) under the tongue every 5 (five) minutes as needed for Chest pain. 30 tablet 0    omeprazole (PRILOSEC) 40 MG capsule Take 40 mg by mouth once daily.      ONETOUCH DELICA PLUS LANCET 33 gauge Misc Apply topically 3 (three)  "times daily.      pen needle, diabetic 32 gauge x 5/32" Ndle 1 each by Misc.(Non-Drug; Combo Route) route once daily. 100 each 0    predniSONE (DELTASONE) 20 MG tablet Take 3 tablets (60 mg total) by mouth once daily. 90 tablet 0    rosuvastatin (CRESTOR) 20 MG tablet Take 1 tablet (20 mg total) by mouth once daily. 90 tablet 3    sacubitriL-valsartan (ENTRESTO) 24-26 mg per tablet Take 1 tablet by mouth 2 (two) times daily. 180 tablet 3    sucralfate (CARAFATE) 1 gram tablet Take 1 tablet (1 g total) by mouth 4 (four) times daily before meals and nightly. 120 tablet 2    [DISCONTINUED] insulin glargine U-100, Lantus, (LANTUS SOLOSTAR U-100 INSULIN) 100 unit/mL (3 mL) InPn pen Inject 12 Units into the skin once daily. 6 mL 0    [DISCONTINUED] sertraline (ZOLOFT) 25 MG tablet Take 1 tablet (25 mg total) by mouth once daily. 30 tablet 0     No current facility-administered medications on file prior to visit.       Allergies   Review of patient's allergies indicates:   Allergen Reactions    Dapagliflozin      Other reaction(s): Other (See Comments)    Pcn [penicillins]     Linagliptin Other (See Comments)     "it knocked me down", "it almost killed me"    Lisinopril Other (See Comments)     cough    Pantoprazole Hives       Review of Systems (Pertinent positives)  Review of Systems   Constitutional: Negative.  Negative for chills and fever.   HENT: Negative.  Negative for congestion, sinus pain and sore throat.    Eyes: Negative.    Respiratory:  Positive for shortness of breath. Negative for cough and wheezing.    Cardiovascular:  Negative for chest pain, palpitations, orthopnea and claudication.   Gastrointestinal: Negative.  Negative for abdominal pain, diarrhea, nausea and vomiting.   Genitourinary: Negative.  Negative for dysuria, frequency and urgency.   Musculoskeletal: Negative.  Negative for back pain, joint pain and neck pain.   Skin: Negative.    Neurological: Negative.  Negative for dizziness, tingling, " "loss of consciousness and headaches.   Endo/Heme/Allergies: Negative.    Psychiatric/Behavioral: Negative.         /78 (BP Location: Right arm, Patient Position: Sitting)   Pulse 104   Temp 97.9 °F (36.6 °C) (Oral)   Resp 19   Ht 5' 8" (1.727 m)   Wt 59.4 kg (130 lb 15.3 oz)   SpO2 96%   BMI 19.91 kg/m²     Physical Exam  Vitals reviewed.   Constitutional:       General: He is not in acute distress.     Appearance: Normal appearance. He is not ill-appearing, toxic-appearing or diaphoretic.      Comments: Speaking in complete sentences in no acute distress.  On 3 L nasal cannula   HENT:      Head: Normocephalic and atraumatic.   Cardiovascular:      Rate and Rhythm: Normal rate and regular rhythm.      Pulses: Normal pulses.      Heart sounds: Normal heart sounds.   Pulmonary:      Effort: Pulmonary effort is normal. No tachypnea or accessory muscle usage.      Breath sounds: Examination of the right-middle field reveals decreased breath sounds. Examination of the left-middle field reveals decreased breath sounds. Examination of the right-lower field reveals decreased breath sounds. Decreased breath sounds present. No wheezing, rhonchi or rales.   Abdominal:      General: Bowel sounds are normal. There is no distension.      Palpations: Abdomen is soft.      Tenderness: There is no abdominal tenderness.   Musculoskeletal:         General: No swelling, tenderness or deformity. Normal range of motion.   Skin:     General: Skin is warm and dry.      Capillary Refill: Capillary refill takes less than 2 seconds.   Neurological:      General: No focal deficit present.      Mental Status: He is alert and oriented to person, place, and time.   Psychiatric:         Mood and Affect: Mood normal.         Behavior: Behavior normal.            Assessment/Plan:  Emmanuel Grant is a 74 y.o. male who presents today for :    Emmanuel was seen today for follow-up.    Diagnoses and all orders for this visit:    Chronic " hypoxemic respiratory failure  -     Ambulatory referral/consult to [facility]IN[facility] Palliative Care; Future    Steroid-induced hyperglycemia  -     insulin glargine U-100, Lantus, (LANTUS SOLOSTAR U-100 INSULIN) 100 unit/mL (3 mL) InPn pen; Inject 15 Units into the skin once daily.    Type 2 diabetes mellitus with right eye affected by mild nonproliferative retinopathy without macular edema, with long-term current use of insulin    Chronic HFrEF (heart failure with reduced ejection fraction)    Gastroesophageal reflux disease without esophagitis  -     famotidine (PEPCID) 20 MG tablet; Take 1 tablet (20 mg total) by mouth 2 (two) times daily as needed for Heartburn.    I had a detailed discussion with the patient regarding his symptoms and conditions and further workup/treatment going forward.    He is on 3 L nasal cannula today.  Reports shortness of breath is stable on prednisone.  He will be seeing pulmonology next week.  Recommended him to continue taking his steroids and inhalers as prescribed.  We discussed potential palliative care consult given frequent admissions and chronic dyspnea.  He is agreeable to this to help babar some of his symptoms.  He can continue DuoNebs p.r.n. as well.    His blood sugar is elevated again due to the steroids.  Will increase Lantus by 25% to 15 units today.  I have reached out to endocrinology to see if he can be seen sooner than May.  Hopefully with his upcoming pulmonology appointment, his p.o. steroids can be weaned.    He is clinically euvolemic today.  No crackles on breath sounds.  He does report some intermittent GERD that might be contributing to the symptoms.  Has allergy to PPI.  Will start on famotidine as needed.  Per up-to-date, no interactions with CellCept or Entresto.    All questions answered.  Follow up with PCP as directed and scheduled.        This office note has been dictated.  This dictation has been generated using M-Modal Fluency Direct dictation; some phonetic  errors may occur.

## 2025-01-27 NOTE — PROGRESS NOTES
CC: This 74 y.o.  male  is here for evaluation of  T2DM along with comorbidities indicated in the Visit Diagnosis section of this encounter.    HPI: Emmanuel Grant was diagnosed with T2DM around age 47. Metformin started at the time of diagnosis.   Med hx: CHF, interstitial lung disease, h/o CVA with left sided weakness     DM COMPLICATIONS: cardiovascular disease    Initial visit 8/2024  Last seen by Dr. Gerard in 2021 for thyroid problem. New to me.   He is on prednisone taper for ILD.   Pt does not want to start insulin.   Plan   Hyperglycemia secondary to steroids coupled with high-carb diet. Pt agrees to dietary change and insulin ss.   Must reduce carbohydrate intake significantly especially while on prednisone.   Avoid beverages with sugar.   See Diabetes Education. - for diet review, Hola and insulin training.   Test blood sugar 2x/day with fingersticks; or with Hola 3  Continuous Glucose Monitor (CGM) sensor.   Start Humalog insulin correction scale as needed before meals:  150-200=+2, 201-250=+4; 251-300=+6; 301-350=+8, over 350=+10 units  Return to clinic in 2-3 months with labs prior. Contact office if blood sugars remains consistently higher than 250 or drop lower than 80 often.          Interval hx  Pt returns back today again d/t steroid-induced hyperglycemia. He no-showed last visit in Nov. He has been admitted multiple times for ILD, treated with prednisone.     Currently taking prednisone 40 mg once daily. Has appt with Pulmonary next week. Feels hungry all the time.     Pt adjusts his DM meds to avoid lows.   Pt adjusts metformin from 1/2 to 1 tablet once to twice daily depending on his BG, to avoid lows. Occasional nausea. No diarrhea.     Pt takes glimepiride 1/2 to 1 tablet once daily (never bid as rx'd), often skips. Pt takes Lantus 0-10 units once daily.   Yesterday, pt took metformin 1000 mg bid, skipped glimepiride, and took Lantus 4-5 units at night.       LAST DIABETES EDUCATION:  "never       HOSPITALIZED FOR DIABETES -  No. Went to ED 4/2024 for hypoglycemia (BG 51) on glipizide     SIGNIFICANT DIABETES MED HISTORY:   Farxiga - "knocked me down," LOC    PRESCRIBED DIABETES MEDICATIONS:   Glimepiride 2 mg bid   metformin 1000 mg bid       Misses medication doses - No        SELF MONITORING BLOOD GLUCOSE: Hola 3 CGM aubrey     CGM interpretation:  fluctuating glucoses as pt adjusts his medications for DM on a daily basis. BG dropped to 79 this morning despite skipping glimepiride yesterday and taking low dose of basal insulin.           HYPOGLYCEMIC EPISODES: none recent  but has had lows overnight.     CURRENT DIET: drinks water and coconut water. Eats 3 meals/day. Snacks on fruit after dinner.   No coffee.     Diet recall: breakfast was pancakes, a little syrup, apple juice, egg. Lunch was ? Dinner was red beans and rice, sausage, apple juice.           CURRENT EXERCISE:       /70 (BP Location: Right arm, Patient Position: Sitting)   Pulse (!) 120   Temp 97.1 °F (36.2 °C)   Wt 59.5 kg (131 lb 3.2 oz)   BMI 19.95 kg/m²     ROS:   CONSTITUTIONAL: Appetite good, denies fatigue  GI: + nausea; no diarrhea   : + urinary frequency      PHYSICAL EXAM:  GENERAL: Well developed, well nourished. No acute distress. Appears chronically ill.   PSYCH: AAOx3, appropriate mood and affect, conversant, well-groomed. Judgement and insight good.   NEURO: Cranial nerves grossly intact. Speech clear.   CHEST: Respirations even and unlabored.       Hemoglobin A1C   Date Value Ref Range Status   01/02/2025 7.8 (H) 4.0 - 5.6 % Final     Comment:     ADA Screening Guidelines:  5.7-6.4%  Consistent with prediabetes  >or=6.5%  Consistent with diabetes    High levels of fetal hemoglobin interfere with the HbA1C  assay. Heterozygous hemoglobin variants (HbS, HgC, etc)do  not significantly interfere with this assay.   However, presence of multiple variants may affect accuracy.     09/25/2024 8.0 (H) 4.0 - 5.6 % " "Final     Comment:     ADA Screening Guidelines:  5.7-6.4%  Consistent with prediabetes  >or=6.5%  Consistent with diabetes    High levels of fetal hemoglobin interfere with the HbA1C  assay. Heterozygous hemoglobin variants (HbS, HgC, etc)do  not significantly interfere with this assay.   However, presence of multiple variants may affect accuracy.     08/05/2024 8.3 (H) 4.0 - 5.6 % Final     Comment:     ADA Screening Guidelines:  5.7-6.4%  Consistent with prediabetes  >or=6.5%  Consistent with diabetes    High levels of fetal hemoglobin interfere with the HbA1C  assay. Heterozygous hemoglobin variants (HbS, HgC, etc)do  not significantly interfere with this assay.   However, presence of multiple variants may affect accuracy.         No results found for: "CPEPTIDE", "GLUTAMICACID", "ISLETCELLANT", "FRUCTOSAMINE"     Lab Results   Component Value Date    CHOL 103 (L) 10/18/2024    CHOL 109 (L) 05/07/2024     Lab Results   Component Value Date    HDL 32 (L) 10/18/2024    HDL 39 (L) 05/07/2024     Lab Results   Component Value Date    LDLCALC 55.4 (L) 10/18/2024    LDLCALC 58.0 (L) 05/07/2024     Lab Results   Component Value Date    TRIG 78 10/18/2024    TRIG 60 05/07/2024     Lab Results   Component Value Date    CHOLHDL 31.1 10/18/2024    CHOLHDL 35.8 05/07/2024           Component Value Date/Time     01/20/2025 0554    K 3.5 01/20/2025 0554    CL 99 01/20/2025 0554    CO2 32 (H) 01/20/2025 0554    BUN 21 01/20/2025 0554    CREATININE 0.9 01/20/2025 0554     (H) 01/20/2025 0554    CALCIUM 9.2 01/20/2025 0554    ALKPHOS 56 01/20/2025 0554    AST 15 01/20/2025 0554    ALT 20 01/20/2025 0554    BILITOT 0.4 01/20/2025 0554    EGFRNORACEVR >60 01/20/2025 0554    ESTGFRAFRICA 71 (L) 11/21/2022 0926         Lab Results   Component Value Date    LABMICR 601.0 05/07/2024    CREATRANDUR 83.0 05/07/2024    MICALBCREAT 724.1 (H) 05/07/2024                ASSESSMENT and PLAN:    A1C GOAL:     1. Type 2 diabetes " mellitus with hyperglycemia, without long-term current use of insulin  Pt does not require basal insulin. Despite high dose steroid treatment, pt may be able to achieve glucose control without prandial insulin if he can remain adherent on metformin and glimepiride.     Advised pt -  Change metformin to metformin  mg tablets, which is more tolerable.   Take 2 tablets twice daily with food.   Metformin does not drop blood sugars too low. Do not adjust metformin dose based off blood sugar reading.     Stop Lantus.     Make sure to take glimepiride 4 mg once daily before 1st meal of the day with metformin. Do not skip.  -- if steroid dose is reduced, then glimepiride needs to be reduced/stopped as well.     Inject Novolog as needed if blood sugar is high before a meal:   Blood sugar 180 to 230, + 1 unit  Blood sugar 231 to 280, + 2 units  Blood sugar 281 to 330, + 3 units  Blood sugar 331 to 380, + 4 units  Blood sugar greater than 380, + 5units      Avoid beverages with sugar.     Return to clinic in 1 month.               2. Steroid-induced hyperglycemia        3. H/O: CVA (cerebrovascular accident)  Improve glycemic control.               No orders of the defined types were placed in this encounter.       Follow up in about 4 weeks (around 2/25/2025).

## 2025-01-28 ENCOUNTER — TELEPHONE (OUTPATIENT)
Dept: PULMONOLOGY | Facility: CLINIC | Age: 75
End: 2025-01-28
Payer: MEDICARE

## 2025-01-28 ENCOUNTER — OFFICE VISIT (OUTPATIENT)
Dept: ENDOCRINOLOGY | Facility: CLINIC | Age: 75
End: 2025-01-28
Payer: MEDICARE

## 2025-01-28 VITALS
HEART RATE: 120 BPM | BODY MASS INDEX: 19.95 KG/M2 | TEMPERATURE: 97 F | DIASTOLIC BLOOD PRESSURE: 70 MMHG | SYSTOLIC BLOOD PRESSURE: 118 MMHG | WEIGHT: 131.19 LBS

## 2025-01-28 DIAGNOSIS — R73.9 STEROID-INDUCED HYPERGLYCEMIA: ICD-10-CM

## 2025-01-28 DIAGNOSIS — T38.0X5A STEROID-INDUCED HYPERGLYCEMIA: ICD-10-CM

## 2025-01-28 DIAGNOSIS — Z86.73 H/O: CVA (CEREBROVASCULAR ACCIDENT): ICD-10-CM

## 2025-01-28 DIAGNOSIS — E11.65 TYPE 2 DIABETES MELLITUS WITH HYPERGLYCEMIA, WITHOUT LONG-TERM CURRENT USE OF INSULIN: Primary | ICD-10-CM

## 2025-01-28 PROCEDURE — 3078F DIAST BP <80 MM HG: CPT | Mod: CPTII,S$GLB,, | Performed by: NURSE PRACTITIONER

## 2025-01-28 PROCEDURE — 1159F MED LIST DOCD IN RCRD: CPT | Mod: CPTII,S$GLB,, | Performed by: NURSE PRACTITIONER

## 2025-01-28 PROCEDURE — 99999 PR PBB SHADOW E&M-EST. PATIENT-LVL IV: CPT | Mod: PBBFAC,,, | Performed by: NURSE PRACTITIONER

## 2025-01-28 PROCEDURE — G2211 COMPLEX E/M VISIT ADD ON: HCPCS | Mod: S$GLB,,, | Performed by: NURSE PRACTITIONER

## 2025-01-28 PROCEDURE — 3051F HG A1C>EQUAL 7.0%<8.0%: CPT | Mod: CPTII,S$GLB,, | Performed by: NURSE PRACTITIONER

## 2025-01-28 PROCEDURE — 1160F RVW MEDS BY RX/DR IN RCRD: CPT | Mod: CPTII,S$GLB,, | Performed by: NURSE PRACTITIONER

## 2025-01-28 PROCEDURE — 3074F SYST BP LT 130 MM HG: CPT | Mod: CPTII,S$GLB,, | Performed by: NURSE PRACTITIONER

## 2025-01-28 PROCEDURE — 95251 CONT GLUC MNTR ANALYSIS I&R: CPT | Mod: S$GLB,,, | Performed by: NURSE PRACTITIONER

## 2025-01-28 PROCEDURE — 1111F DSCHRG MED/CURRENT MED MERGE: CPT | Mod: CPTII,S$GLB,, | Performed by: NURSE PRACTITIONER

## 2025-01-28 PROCEDURE — 99215 OFFICE O/P EST HI 40 MIN: CPT | Mod: S$GLB,,, | Performed by: NURSE PRACTITIONER

## 2025-01-28 PROCEDURE — 3008F BODY MASS INDEX DOCD: CPT | Mod: CPTII,S$GLB,, | Performed by: NURSE PRACTITIONER

## 2025-01-28 RX ORDER — METFORMIN HYDROCHLORIDE 500 MG/1
1000 TABLET, EXTENDED RELEASE ORAL 2 TIMES DAILY WITH MEALS
Qty: 360 TABLET | Refills: 0 | Status: SHIPPED | OUTPATIENT
Start: 2025-01-28 | End: 2026-01-28

## 2025-01-28 RX ORDER — INSULIN ASPART 100 [IU]/ML
INJECTION, SOLUTION INTRAVENOUS; SUBCUTANEOUS
Qty: 15 ML | Refills: 0 | Status: SHIPPED | OUTPATIENT
Start: 2025-01-28

## 2025-01-28 RX ORDER — GLIMEPIRIDE 2 MG/1
4 TABLET ORAL
Qty: 180 TABLET | Refills: 0 | Status: SHIPPED | OUTPATIENT
Start: 2025-01-28 | End: 2026-01-28

## 2025-01-28 RX ORDER — INSULIN ASPART 100 [IU]/ML
INJECTION, SOLUTION INTRAVENOUS; SUBCUTANEOUS
Qty: 15 ML | Refills: 0 | Status: SHIPPED | OUTPATIENT
Start: 2025-01-28 | End: 2025-01-28

## 2025-01-28 NOTE — PATIENT INSTRUCTIONS
Change metformin to metformin  mg tablets, which is more tolerable.   Take 2 tablets twice daily with food.   Metformin does not drop blood sugars too low. Do not adjust metformin dose based off blood sugar reading.     Stop Lantus.     Make sure to take glimepiride 4 mg once daily before 1st meal of the day with metformin. Do not skip.  -- if steroid dose is reduced, then glimepiride needs to be reduced/stopped as well.     Inject Novolog as needed if blood sugar is high before a meal:   Blood sugar 180 to 230, + 1 unit  Blood sugar 231 to 280, + 2 units  Blood sugar 281 to 330, + 3 units  Blood sugar 331 to 380, + 4 units  Blood sugar greater than 380, + 5units      Avoid beverages with sugar.     Return to clinic in 1 month.

## 2025-01-28 NOTE — TELEPHONE ENCOUNTER
----- Message from [facility] sent at 1/28/2025 11:49 AM CST -----  Who called:self    What is the request in detail:pt states he believes his oxygen battery is not holding a charge     Can the clinic reply by [facility]?no    Would the patient rather a call back or a response via [facility]?call     Best call back number:.[phone]    Additional Information:

## 2025-02-02 NOTE — DISCHARGE SUMMARY
[facility] Medicine  Discharge Summary      Patient Name: Emmanuel Grant  MRN: [id number]  [facility]: [id number]  Patient Class: IP- Inpatient  Admission Date: 1/16/2025  Hospital Length of Stay: 4 days  Discharge Date and Time: 1/20/2025  3:06 PM  Attending Physician: Emily att. providers found   Discharging Provider: Blanquita Hunt MD  Primary Care Provider: Wilfredo De Souza MD    Primary Care Team: Networked reference to record PCT     HPI:     Emmanuel Grant is a 74 y.o. male who  has a past medical history of BPH, CAD, Chronic HFrEF, Hypertension, Kidney stone, Stroke, and Type 2 diabetes mellitus without complication, without long-term current use of insulin, presented to the ED with CC of SOB.     Patient has been SOB since 4am this morning and progressively worse. Did have URI symptoms for past 2-3 days. With intermittent fever/chills and cough. He is on home O2 at 2L,, but had to increase his home O2. In the ED, patient was decompensating and was nearly intubated. Given ativan on Bipap and was able to avoid intubation. Pulm consulted. Admitted to ICU. Maintaining his pH and labs unremarkable. COVID/FLU/RSV negative. BNP, trop, LA and procal all negative. HPI limited as patient is quite ill/tired on Bipap.     * No surgery found *      Hospital Course:     74 y.o. male who  has a past medical history of BPH, CAD, Chronic HFrEF, Hypertension, Kidney stone, Stroke, and Type 2 diabetes mellitus without complication, without long-term current use of insulin, presented to the ED with CC of SOB.  Patient has been SOB since 4am this morning and progressively worse. Did have URI symptoms for past 2-3 days. With intermittent fever/chills and cough. He is on home O2 at 2L,, but had to increase his home O2. In the ED, patient was decompensating and was nearly intubated. Given ativan on Bipap and was able to avoid intubation. Pulm consulted. Admitted to ICU. Maintaining his pH and labs unremarkable.  COVID/FLU/RSV negative. BNP, trop, LA and procal all negative.  Patient was initially in the ICU before being transferred to telemetry On 01/17.  Etiology of this condition was attributable to exacerbation of interstitial lung disease.  CellCept was initially held later on restarted.  CTA obtain showed no evidence of PE but consistent with history of ILD.  Patient was started on Solu-Medrol which was progressively weaned to prednisone 60 daily with plan for slow outpatient taper.  Lipitor was also changed to Crestor given concern for ILD.  Patient was discharged with close follow up by pulmonology for PFTs and for consideration of Ofev.       Goals of Care Treatment Preferences:  Code Status: Full Code      SDOH Screening:  The patient was unable to be screened for utility difficulties, food insecurity, transport difficulties, housing insecurity, and interpersonal safety, so no concerns could be identified this admission.     Consults:     * Interstitial lung disease exacerbation  Admitted with ILD exacerbation to the ICU; step-down to telemetry status on 01/17  S/p BiPAP in the ICU; currently on 3 L N/C  CTA chest showed no evidence of infection but consistent with history of ILD  Viral respiratory pathogens negative  CellCept held on admission  S/p Solu-Medrol 60 Q8H; de-escalated to 60 Q12H today; deescalated to 60 mg QD on 1/19; with a plan to discharge on p.o. prednisone 60 mg until seen in pulmonology [facility]  Outpatient follow-up for PFT and of have consideration; however family stated that the maybe moving him to [place]; discussed with case management  CT home CellCept  Palliative care consulted; input appreciated  Change Lipitor to Crestor the setting of a ILD      GERD with esophagitis  -patient complaining of nausea and epigastric fullness  -we will start Protonix 40 mg daily for prophylaxis while on steroids      Immunosuppression due to drug therapy  noted      Acute hypoxic respiratory  failure  Patient with Hypoxic Respiratory failure which is Acute on chronic.    he is on home oxygen at 2 LPM. Supplemental oxygen was provided and noted- Oxygen Concentration (%):  [30] 3  Signs/symptoms of respiratory failure include- increased work of breathing and respiratory distress.   Contributing diagnoses includes - Interstitial lung disease Labs and images were reviewed.   Patient Has recent ABG, which has been reviewed.   Will treat underlying causes and adjust management of respiratory failure as follows- Treat for ILD exacerbation. Continue BiPAP for now.       Chronic HFrEF (heart failure with reduced ejection fraction)  Stable, chronic  Not overly volume overloaded, appears more pulmonary decompensation      GERD (gastroesophageal reflux disease)  Patient allergic to PPIs  -continue famotidine and simethicone      Type 2 diabetes mellitus without complication, without long-term current use of insulin  Diabetic diet  SSI   Maintain blood glucose log; titrate to effect        Final Active Diagnoses:    Diagnosis Date Noted POA    PRINCIPAL PROBLEM:  Interstitial lung disease exacerbation [J84.9] 02/28/2024 Yes    Iron deficiency anemia [D50.9] 01/17/2025 Yes    Immunosuppression due to drug therapy [D84.821, Z79.899] 10/10/2024 Not Applicable    Acute hypoxic respiratory failure [J96.01] 09/23/2024 Yes    Chronic HFrEF (heart failure with reduced ejection fraction) [I50.22] 05/01/2024 Yes    GERD (gastroesophageal reflux disease) [K21.9] 04/20/2024 Yes    Coronary artery disease involving native coronary artery of native heart with angina pectoris [I25.119] 02/28/2024 Yes    Type 2 diabetes mellitus without complication, without long-term current use of insulin [E11.9] 10/08/2021 Yes    Essential hypertension [I10] 05/29/2020 Yes      Problems Resolved During this Admission:       Discharged Condition: stable    Disposition: Home or Self Care    Follow Up:    Patient Instructions:      Ambulatory  "referral/consult to Pulmonary Rehab   Standing Status: Future   Referral Priority: Routine Referral Type: Consultation   Referral Reason: Specialty Services Required   Referred to Provider: BETHEL ESTRELLA Requested Specialty: Pulmonary Disease   Number of Visits Requested: 1     Ambulatory referral/consult to Internal Medicine   Standing Status: Future   Referral Priority: Routine Referral Type: Consultation   Referral Reason: Specialty Services Required   Requested Specialty: Internal Medicine   Number of Visits Requested: 1     Diet Cardiac     Diet diabetic     Notify your health care provider if you experience any of the following:  temperature >100.4     Notify your health care provider if you experience any of the following:  difficulty breathing or increased cough     Notify your health care provider if you experience any of the following:  increased confusion or weakness     Activity as tolerated       Significant Diagnostic Studies: N/A    Pending Diagnostic Studies:       None           Medications:  Reconciled Home Medications:      Medication List        START taking these medications      predniSONE 20 MG tablet  Commonly known as: DELTASONE  Take 3 tablets (60 mg total) by mouth once daily.     rosuvastatin 20 MG tablet  Commonly known as: CRESTOR  Take 1 tablet (20 mg total) by mouth once daily.            CONTINUE taking these medications      albuterol-ipratropium 2.5 mg-0.5 mg/3 mL nebulizer solution  Commonly known as: DUO-NEB  Take 3 mLs by nebulization every 6 (six) hours as needed for Wheezing. Rescue     amLODIPine 10 MG tablet  Commonly known as: NORVASC  Take 1 tablet (10 mg total) by mouth once daily.     arformoteroL 15 mcg/2 mL Nebu  Commonly known as: BROVANA  Take 2 mLs (15 mcg total) by nebulization 2 (two) times daily. Controller     aspirin 81 MG Chew  Take 81 mg by mouth once daily.     BD ULTRA-FINE HEATHER PEN NEEDLE 32 gauge x 5/32" Ndle  Generic drug: pen needle, diabetic  1 " each by Misc.(Non-Drug; Combo Route) route once daily.     budesonide 0.5 mg/2 mL nebulizer solution  Commonly known as: PULMICORT  Take 4 mLs (1 mg total) by nebulization 2 (two) times daily. Controller     cholecalciferol (vitamin D3) 50 mcg (2,000 unit) Cap capsule  Commonly known as: VITAMIN D3  Take 1 capsule by mouth once daily.     ENTRESTO 24-26 mg per tablet  Generic drug: sacubitriL-valsartan  Take 1 tablet by mouth 2 (two) times daily.     FREESTYLE RAMBO 3 PLUS SENSOR Blanche  Generic drug: blood-glucose sensor  Change every 15 days     furosemide 20 MG tablet  Commonly known as: LASIX  Take 1 tablet (20 mg total) by mouth once daily.     latanoprost (PF) 0.005 % Drop  1 drop into affected eye in the evening Ophthalmic Once a day     metoprolol succinate 50 MG 24 hr tablet  Commonly known as: TOPROL-XL  Take 1 tablet (50 mg total) by mouth once daily.     mirabegron 25 mg Tb24 ER tablet  Commonly known as: MYRBETRIQ  Take 1 tablet (25 mg total) by mouth once daily.     mycophenolate 250 mg Cap  Commonly known as: CELLCEPT  Take 4 capsules (1,000 mg total) by mouth 2 (two) times daily.     nitroGLYCERIN 0.3 MG SL tablet  Commonly known as: NITROSTAT  Place 1 tablet (0.3 mg total) under the tongue every 5 (five) minutes as needed for Chest pain.     omeprazole 40 MG capsule  Commonly known as: PRILOSEC  Take 40 mg by mouth once daily.     ONETOUCH DELICA PLUS LANCET 33 gauge Misc  Generic drug: lancets  Apply topically 3 (three) times daily.     sucralfate 1 gram tablet  Commonly known as: CARAFATE  Take 1 tablet (1 g total) by mouth 4 (four) times daily before meals and nightly.            STOP taking these medications      atorvastatin 40 MG tablet  Commonly known as: LIPITOR     carvediloL 25 MG tablet  Commonly known as: COREG     glimepiride 2 MG tablet  Commonly known as: AMARYL              Indwelling Lines/Drains at time of discharge:   Lines/Drains/Airways       None                   Time spent on  the discharge of patient: more than 35 minutes         Blanquita Hunt MD  Department of Hospital Medicine  [facility] - Telemetry

## 2025-02-04 ENCOUNTER — CLINICAL SUPPORT (OUTPATIENT)
Dept: OPHTHALMOLOGY | Facility: CLINIC | Age: 75
End: 2025-02-04
Payer: MEDICARE

## 2025-02-04 ENCOUNTER — OFFICE VISIT (OUTPATIENT)
Dept: OPHTHALMOLOGY | Facility: CLINIC | Age: 75
End: 2025-02-04
Payer: MEDICARE

## 2025-02-04 DIAGNOSIS — H25.13 AGE-RELATED NUCLEAR CATARACT OF BOTH EYES: ICD-10-CM

## 2025-02-04 DIAGNOSIS — H34.8122 STABLE CENTRAL RETINAL VEIN OCCLUSION OF LEFT EYE: Primary | ICD-10-CM

## 2025-02-04 DIAGNOSIS — H40.1123 PRIMARY OPEN ANGLE GLAUCOMA (POAG) OF LEFT EYE, SEVERE STAGE: ICD-10-CM

## 2025-02-04 DIAGNOSIS — H35.033 HYPERTENSIVE RETINOPATHY OF BOTH EYES: ICD-10-CM

## 2025-02-04 DIAGNOSIS — H40.1111 PRIMARY OPEN ANGLE GLAUCOMA (POAG) OF RIGHT EYE, MILD STAGE: ICD-10-CM

## 2025-02-04 DIAGNOSIS — E11.9 DIABETES MELLITUS TYPE 2 WITHOUT RETINOPATHY: ICD-10-CM

## 2025-02-04 PROCEDURE — 92202 OPSCPY EXTND ON/MAC DRAW: CPT | Mod: 59,HCNC,S$GLB, | Performed by: OPHTHALMOLOGY

## 2025-02-04 PROCEDURE — 1159F MED LIST DOCD IN RCRD: CPT | Mod: HCNC,CPTII,S$GLB, | Performed by: OPHTHALMOLOGY

## 2025-02-04 PROCEDURE — 1111F DSCHRG MED/CURRENT MED MERGE: CPT | Mod: HCNC,CPTII,S$GLB, | Performed by: OPHTHALMOLOGY

## 2025-02-04 PROCEDURE — 1126F AMNT PAIN NOTED NONE PRSNT: CPT | Mod: HCNC,CPTII,S$GLB, | Performed by: OPHTHALMOLOGY

## 2025-02-04 PROCEDURE — 3288F FALL RISK ASSESSMENT DOCD: CPT | Mod: HCNC,CPTII,S$GLB, | Performed by: OPHTHALMOLOGY

## 2025-02-04 PROCEDURE — 99999 PR PBB SHADOW E&M-EST. PATIENT-LVL III: CPT | Mod: PBBFAC,HCNC,, | Performed by: OPHTHALMOLOGY

## 2025-02-04 PROCEDURE — G2211 COMPLEX E/M VISIT ADD ON: HCPCS | Mod: HCNC,S$GLB,, | Performed by: OPHTHALMOLOGY

## 2025-02-04 PROCEDURE — 99214 OFFICE O/P EST MOD 30 MIN: CPT | Mod: HCNC,S$GLB,, | Performed by: OPHTHALMOLOGY

## 2025-02-04 PROCEDURE — 92134 CPTRZ OPH DX IMG PST SGM RTA: CPT | Mod: HCNC,S$GLB,, | Performed by: OPHTHALMOLOGY

## 2025-02-04 PROCEDURE — 1160F RVW MEDS BY RX/DR IN RCRD: CPT | Mod: HCNC,CPTII,S$GLB, | Performed by: OPHTHALMOLOGY

## 2025-02-04 PROCEDURE — 1101F PT FALLS ASSESS-DOCD LE1/YR: CPT | Mod: HCNC,CPTII,S$GLB, | Performed by: OPHTHALMOLOGY

## 2025-02-04 PROCEDURE — 3051F HG A1C>EQUAL 7.0%<8.0%: CPT | Mod: HCNC,CPTII,S$GLB, | Performed by: OPHTHALMOLOGY

## 2025-02-05 ENCOUNTER — TELEPHONE (OUTPATIENT)
Dept: FAMILY MEDICINE | Facility: CLINIC | Age: 75
End: 2025-02-05
Payer: MEDICARE

## 2025-02-05 PROBLEM — E11.9 DIABETES MELLITUS TYPE 2 WITHOUT RETINOPATHY: Status: ACTIVE | Noted: 2025-02-05

## 2025-02-05 PROBLEM — H34.8122 STABLE CENTRAL RETINAL VEIN OCCLUSION OF LEFT EYE: Status: ACTIVE | Noted: 2025-02-05

## 2025-02-05 PROBLEM — H35.033 HYPERTENSIVE RETINOPATHY OF BOTH EYES: Status: ACTIVE | Noted: 2025-02-05

## 2025-02-05 PROBLEM — H25.13 AGE-RELATED NUCLEAR CATARACT OF BOTH EYES: Status: ACTIVE | Noted: 2025-02-05

## 2025-02-05 NOTE — TELEPHONE ENCOUNTER
----- Message from Kamaljit sent at 2/5/2025  3:49 PM CST -----  Regarding: Self  Who Called: Self    Symptoms (please be specific):  constipation     How long has patient had these symptoms:  last night     Pharmacy: .  [id number] [address] AT [address] [address]  Phone: [phone] Fax: [phone]            Would the patient rather a call back or a response via [facility]?  Call back     Best Call Back Number: .[phone]      Additional Information: Pt stated he felt like he was going to die last night it hurt so bad he got SOB from pushing so hard.

## 2025-02-05 NOTE — PROGRESS NOTES
HPI    NP Consult DFE/OCTm OU for DX of HTN Retinopathy OU    DLS  01/10/2025 by Dr. Samira Palma MD    Cc: pt states vision is right eye is good but my left eye has the worse   vision. I have an eye injection x 1 yr ago w/ Dr. Olaf MD    ++blurred va  ++diplopia  --eye pain   --flashes/floaters  ++headaches ( not very often)  ++curtain OS/--shadow//--veil    Eye Meds: Latanoprost qhs OU                       POHx:   1. POAG OD (Mild stage)      POAG OS ( Severe stage)    2. RAPD OS  3. Combined Form AR Cataract OU  4. HTN Retinopathy OU  (Edvin superior arcade OS)  Last edited by Elise Masters MA on 2/4/2025  2:03 PM.         A/P    ICD-10-CM ICD-9-CM   1. Stable central retinal vein occlusion of left eye  H34.8122 362.35   2. Hypertensive retinopathy of both eyes  H35.033 362.11   3. Diabetes mellitus type 2 without retinopathy  E11.9 250.00   4. Age-related nuclear cataract of both eyes  H25.13 366.16   5. Primary open angle glaucoma (POAG) of right eye, mild stage  H40.1111 365.11     365.71   6. Primary open angle glaucoma (POAG) of left eye, severe stage  H40.1123 365.11     365.73       1. Stable central retinal vein occlusion of left eye  2. Hypertensive retinopathy of both eyes  Referral from Dr Palma for retina check - Recent notes reviewed    Exam and imaging notable for very ischemic retina OS, some hemorrhage noted, likely mix mechanism RVO/GRACIA given appearance  Plan: Observation for now, can consider baseline FA in future, monitor, recheck 3 mo   Recommend good blood pressure control, tight blood glucose control, and good cholesterol control     Visit today is associated with current or anticipated ongoing medical care related to this patients single serious condition/complex condition (hypertensive retinopathy)     3. Diabetes mellitus type 2 without retinopathy  Pcp Wilfredo De Souza MD - Recent notes reviewed  01/02/2025  7.8  A1C   No DR or DME OU  Plan: Observation   Recommend good blood  pressure control, tight blood glucose control, and good cholesterol control     4. Age-related nuclear cataract of both eyes  Mild NS, NVS  Plan: Observation Update Mrx prn     5. Primary open angle glaucoma (POAG) of right eye, mild stage  6. Primary open angle glaucoma (POAG) of left eye, severe stage  IOP 16 OU  Plan: continue latan qHS OU     RTC 3 mo DFE/OCTm OU        I saw and examined the patient and reviewed in detail the findings documented. The final examination findings, image interpretations which have been independently interpreted, and plan as documented in the record represent my personal judgment and conclusions.    Michael Parham MD  Vitreoretinal Surgery   [facility]

## 2025-02-12 ENCOUNTER — HOSPITAL ENCOUNTER (OUTPATIENT)
Dept: RESPIRATORY THERAPY | Facility: HOSPITAL | Age: 75
Discharge: HOME | End: 2025-02-12
Attending: INTERNAL MEDICINE
Payer: MEDICARE

## 2025-02-12 DIAGNOSIS — J96.11 CHRONIC HYPOXEMIC RESPIRATORY FAILURE: ICD-10-CM

## 2025-02-12 RX ORDER — ALBUTEROL SULFATE 2.5 MG/.5ML
2.5 SOLUTION RESPIRATORY (INHALATION) ONCE
Status: DISCONTINUED | OUTPATIENT
Start: 2025-02-12 | End: 2025-02-12

## 2025-02-12 NOTE — PROCEDURES
Patient arrived for Pft wearing 3L NC with sat of 89% on exertion. PFT not completed after several attempts. Patient with sob and appeared to be very anxious. Patient with difficulty following commands for testing.

## 2025-02-17 ENCOUNTER — OFFICE VISIT (OUTPATIENT)
Dept: PULMONOLOGY | Facility: CLINIC | Age: 75
End: 2025-02-17
Payer: MEDICARE

## 2025-02-17 ENCOUNTER — HOSPITAL ENCOUNTER (OUTPATIENT)
Dept: RADIOLOGY | Facility: HOSPITAL | Age: 75
Discharge: HOME | End: 2025-02-17
Attending: INTERNAL MEDICINE
Payer: MEDICARE

## 2025-02-17 VITALS
SYSTOLIC BLOOD PRESSURE: 117 MMHG | WEIGHT: 128.06 LBS | OXYGEN SATURATION: 88 % | DIASTOLIC BLOOD PRESSURE: 89 MMHG | BODY MASS INDEX: 19.48 KG/M2 | HEART RATE: 144 BPM

## 2025-02-17 DIAGNOSIS — D84.821 IMMUNOSUPPRESSION DUE TO DRUG THERAPY: ICD-10-CM

## 2025-02-17 DIAGNOSIS — J96.11 CHRONIC HYPOXEMIC RESPIRATORY FAILURE: Primary | ICD-10-CM

## 2025-02-17 DIAGNOSIS — Z79.899 IMMUNOSUPPRESSION DUE TO DRUG THERAPY: ICD-10-CM

## 2025-02-17 DIAGNOSIS — J84.9 INTERSTITIAL LUNG DISEASE: ICD-10-CM

## 2025-02-17 DIAGNOSIS — J84.112 IPF (IDIOPATHIC PULMONARY FIBROSIS): ICD-10-CM

## 2025-02-17 LAB
OHS QRS DURATION: 84 MS
OHS QTC CALCULATION: 426 MS

## 2025-02-17 PROCEDURE — 71046 X-RAY EXAM CHEST 2 VIEWS: CPT | Mod: TC

## 2025-02-17 RX ORDER — PREDNISONE 20 MG/1
TABLET ORAL
Qty: 105 TABLET | Refills: 0 | Status: SHIPPED | OUTPATIENT
Start: 2025-02-17 | End: 2025-05-18

## 2025-02-17 RX ORDER — NINTEDANIB 150 MG/1
150 CAPSULE ORAL 2 TIMES DAILY
Qty: 60 CAPSULE | Refills: 11 | Status: ACTIVE | OUTPATIENT
Start: 2025-02-17 | End: 2026-02-17

## 2025-02-17 RX ORDER — SULFAMETHOXAZOLE AND TRIMETHOPRIM 800; 160 MG/1; MG/1
1 TABLET ORAL DAILY
Qty: 30 TABLET | Refills: 4 | Status: SHIPPED | OUTPATIENT
Start: 2025-02-17

## 2025-02-17 RX ORDER — LEVOFLOXACIN 500 MG/1
500 TABLET, FILM COATED ORAL DAILY
Qty: 7 TABLET | Refills: 0 | Status: SHIPPED | OUTPATIENT
Start: 2025-02-17 | End: 2025-02-24

## 2025-02-17 NOTE — ASSESSMENT & PLAN NOTE
O2 dependent with residual high work of breathing.  ABG  POC PH   Date Value Ref Range Status   01/16/2025 7.385 7.35 - 7.45 Final     POC PCO2   Date Value Ref Range Status   01/16/2025 37.9 35 - 45 mmHg Final     POC PO2   Date Value Ref Range Status   01/16/2025 101 (H) 80 - 100 mmHg Final      No hypercapnia.    Continue supplemental O2 24/7. His POC battery is out currently and we have placed him on tanks in the office. He will not tolerate off oxygen. He is in acute respiratory exacerbation but is refusing hospitalization currently.

## 2025-02-17 NOTE — ASSESSMENT & PLAN NOTE
Significant chronic condition to be followed longitudinally with following long term treatment plan.     Severe progression in symptoms since last hospitalization    Treating with steroids as has been responsive in the past. He is on CellCept and Pred 60mg.  -likely needs palliative care with recurrent admissions  -hold lipitor as can be associated with ILD, continue on Crestor as much lower association with ILD  -will need to wean down the Prednisone dose and missed his initial appointment and has been on 60 for 1 month.  -will start PCP Ppx with Bactrim    CXR, CBC, CMP, BNP now- reviewed and no acute process noted

## 2025-02-17 NOTE — PROGRESS NOTES
Subjective:       Patient ID: Emmanuel Grant is a 74 y.o. male.  The patient's last visit with me was on Visit date not found.     Patient has had worsening dyspnea and distress since hospital discharge 3 weeks ago. He is taking Prednisone 60mg daily since then. He is also on Cellcept. He is having non-productive violent cough. He got extremely winded just coming to clinic today and is actively in distress and only able to speak in short sentences. No fevers. No hemoptysis. HR is very elevated today and he reports this has been ongoing at home.     On our tank at 3L he is Satting 95% with improved RR but HR remains 120.    He is driving himself and has very limited social support locally. Attempted to call his son whom I met when he was inpatient, but no answer.    Review of Systems    Objective:      Vitals:    02/17/25 1135   BP: 117/89   Pulse: (!) 144     Wt Readings from Last 3 Encounters:   02/17/25 58.1 kg (128 lb 1.4 oz)   01/28/25 59.5 kg (131 lb 3.2 oz)   01/27/25 59.4 kg (130 lb 15.3 oz)     Temp Readings from Last 3 Encounters:   01/28/25 97.1 °F (36.2 °C)   01/27/25 97.9 °F (36.6 °C) (Oral)   01/20/25 97.4 °F (36.3 °C) (Oral)     BP Readings from Last 3 Encounters:   02/17/25 117/89   01/28/25 118/70   01/27/25 128/78     Pulse Readings from Last 3 Encounters:   02/17/25 (!) 144   01/28/25 (!) 120   01/27/25 104       Physical Exam   Constitutional: He appears distressed.   Cardiovascular:   No murmur heard.  tachy   Pulmonary/Chest: He is in respiratory distress. He has no wheezes. He has no rhonchi. He has rales.   Vitals reviewed.      CBC  Lab Results   Component Value Date    WBC 18.89 (H) 02/17/2025    HGB 16.0 02/17/2025    HCT 48.2 02/17/2025    MCV 84 02/17/2025     02/17/2025         CMP  Sodium   Date Value Ref Range Status   02/17/2025 136 136 - 145 mmol/L Final     Potassium   Date Value Ref Range Status   02/17/2025 3.6 3.5 - 5.1 mmol/L Final     Chloride   Date Value Ref Range  Status   02/17/2025 94 (L) 95 - 110 mmol/L Final     CO2   Date Value Ref Range Status   02/17/2025 29 23 - 29 mmol/L Final     Glucose   Date Value Ref Range Status   02/17/2025 212 (H) 70 - 110 mg/dL Final     BUN   Date Value Ref Range Status   02/17/2025 21 8 - 23 mg/dL Final     Creatinine   Date Value Ref Range Status   02/17/2025 1.2 0.5 - 1.4 mg/dL Final     Calcium   Date Value Ref Range Status   02/17/2025 9.9 8.7 - 10.5 mg/dL Final     Total Protein   Date Value Ref Range Status   02/17/2025 6.9 6.0 - 8.4 g/dL Final     Albumin   Date Value Ref Range Status   02/17/2025 3.2 (L) 3.5 - 5.2 g/dL Final     Total Bilirubin   Date Value Ref Range Status   02/17/2025 0.7 0.1 - 1.0 mg/dL Final     Comment:     For infants and newborns, interpretation of results should be based  on gestational age, weight and in agreement with clinical  observations.    Premature Infant recommended reference ranges:  Up to 24 hours.............<8.0 mg/dL  Up to 48 hours............<12.0 mg/dL  3-5 days..................<15.0 mg/dL  6-29 days.................<15.0 mg/dL       Alkaline Phosphatase   Date Value Ref Range Status   02/17/2025 55 40 - 150 U/L Final     AST   Date Value Ref Range Status   02/17/2025 67 (H) 10 - 40 U/L Final     ALT   Date Value Ref Range Status   02/17/2025 49 (H) 10 - 44 U/L Final     Anion Gap   Date Value Ref Range Status   02/17/2025 13 8 - 16 mmol/L Final     eGFR   Date Value Ref Range Status   02/17/2025 >60.0 >60 mL/min/1.73 m^2 Final       ABG  POC PH   Date Value Ref Range Status   01/16/2025 7.385 7.35 - 7.45 Final     POC PO2   Date Value Ref Range Status   01/16/2025 101 (H) 80 - 100 mmHg Final     POC PCO2   Date Value Ref Range Status   01/16/2025 37.9 35 - 45 mmHg Final           Personal Diagnostic Review  I have personally reviewed the following data and added my own interpretation as below:  Endocrine notes reviewed  PFT attempt noted  EKG with sinus tachycardia, no arrythmia.  CXR  "personally reviewed and shows no clear changes to severe ILD  CBC and CMP reviewed      2/17/2025    11:35 AM 1/28/2025     9:39 AM 1/27/2025     9:25 AM 1/20/2025    11:03 AM 1/20/2025    10:38 AM 1/20/2025     8:02 AM 1/20/2025     4:53 AM   Pulmonary Function Tests   SpO2 88 %  96 % 99 % 100 % 97 % 99 %   Height   5' 8" (1.727 m)       Weight 58.1 kg (128 lb 1.4 oz) 59.5 kg (131 lb 3.2 oz) 59.4 kg (130 lb 15.3 oz)       BMI (Calculated)  20 19.9             Assessment:       1. Chronic hypoxemic respiratory failure    2. Interstitial lung disease exacerbation    3. Immunosuppression due to drug therapy    4. IPF (idiopathic pulmonary fibrosis)        Encounter Medications[1]  1. Chronic hypoxemic respiratory failure  - EKG 12-lead  - NT-Pro Natriuretic Peptide; Future  - Comprehensive Metabolic Panel; Future  - CBC Auto Differential; Future    2. Interstitial lung disease exacerbation  - X-Ray Chest PA And Lateral; Future    3. Immunosuppression due to drug therapy    4. IPF (idiopathic pulmonary fibrosis)  - nintedanib (OFEV) 150 mg Cap; Take 1 capsule (150 mg total) by mouth 2 (two) times a day.  Dispense: 60 capsule; Refill: 11    Plan:     Problem List Items Addressed This Visit          Pulmonary    Interstitial lung disease exacerbation    Current Assessment & Plan   Significant chronic condition to be followed longitudinally with following long term treatment plan.     Severe progression in symptoms since last hospitalization    Treating with steroids as has been responsive in the past. He is on CellCept and Pred 60mg.  -likely needs palliative care with recurrent admissions  -hold lipitor as can be associated with ILD, continue on Crestor as much lower association with ILD  -will need to wean down the Prednisone dose and missed his initial appointment and has been on 60 for 1 month.  -will start PCP Ppx with Bactrim    CXR, CBC, CMP, BNP now- reviewed and no acute process noted           Relevant Orders "    X-Ray Chest PA And Lateral (Completed)    Chronic hypoxemic respiratory failure - Primary    Current Assessment & Plan   O2 dependent with residual high work of breathing.  ABG  POC PH   Date Value Ref Range Status   01/16/2025 7.385 7.35 - 7.45 Final     POC PCO2   Date Value Ref Range Status   01/16/2025 37.9 35 - 45 mmHg Final     POC PO2   Date Value Ref Range Status   01/16/2025 101 (H) 80 - 100 mmHg Final      No hypercapnia.    Continue supplemental O2 24/7. His POC battery is out currently and we have placed him on tanks in the office. He will not tolerate off oxygen. He is in acute respiratory exacerbation but is refusing hospitalization currently.         Relevant Orders    EKG 12-lead (Completed)    NT-Pro Natriuretic Peptide (Completed)    Comprehensive Metabolic Panel (Completed)    CBC Auto Differential (Completed)    IPF (idiopathic pulmonary fibrosis)    Current Assessment & Plan   Significant chronic condition to be followed longitudinally with following long term treatment plan.    Severe progressive disease with severe dyspnea at baseline. Placed on Cellcept with some symptom improvement. Pattern and clinical progression consistent with IPF.  -attempted PFTs but unable to maintain breathhold. I reviewed flow volume loops and consistent with severe restrictive lung disease with maximal FVC of 1L.  -Start Ofev 150 mg BID  -Continue CellCept  -Wean off Prednsione with slow taper  -Bactrim Ppx while on high dose prednisone.  -Have discussed palliative care with patient  -attempted to discuss same day testing with patient but he did not answer and voicemail is full. No answer on son's phone either         Relevant Medications    nintedanib (OFEV) 150 mg Cap       Immunology/Multi System    Immunosuppression due to drug therapy    Current Assessment & Plan   On cell cept and high dose pred. Start PCP Ppx.          Patient with severe chronic illness listed above with severe exacerbation and/or  progression leading to threat to pulmonary function requiring this visit.   Currently we have made the following hospitalization decision: patient refusing hospital admission and will attempt to avoid hospitalization and attempt outpatient treatment with low threshold for admission with failure to improve or worsening.    Please note Overview Notes are historic documentation. Please review A/P for current updates.  No follow-ups on file.    Future Appointments   Date Time Provider Department Center   2/26/2025 12:30 PM Malika Calderon, ANABELA [facility] ENDOCRN [facility]   2/27/2025  2:00 PM Naheed Schmitt MD [facility] PULMSVC [facility]   3/17/2025  1:00 PM Wilfredo De Souza MD [facility] FM IM [place] [facility]   4/10/2025  1:30 PM Samira Palma MD [facility] OPHTHAL [facility]   5/6/2025 10:15 AM Michael Parham MD [facility] OPHTHAL [facility]         Jeff Waldrop MD           [1]   Outpatient Encounter Medications as of 2/17/2025   Medication Sig Dispense Refill    albuterol-ipratropium (DUO-NEB) 2.5 mg-0.5 mg/3 mL nebulizer solution Take 3 mLs by nebulization every 6 (six) hours as needed for Wheezing. Rescue 75 mL 11    amLODIPine (NORVASC) 10 MG tablet Take 1 tablet (10 mg total) by mouth once daily. 90 tablet 3    arformoteroL (BROVANA) 15 mcg/2 mL Nebu Take 2 mLs (15 mcg total) by nebulization 2 (two) times daily. Controller 120 mL 11    aspirin 81 MG Chew Take 81 mg by mouth once daily.      blood-glucose sensor (FREESTYLE RAMBO 3 PLUS SENSOR) Blanche Change every 15 days 6 each 3    budesonide (PULMICORT) 0.5 mg/2 mL nebulizer solution Take 4 mLs (1 mg total) by nebulization 2 (two) times daily. Controller 240 mL 11    cholecalciferol, vitamin D3, (VITAMIN D3) 50 mcg (2,000 unit) Cap capsule Take 1 capsule by mouth once daily.      famotidine (PEPCID) 20 MG tablet Take 1 tablet (20 mg total) by mouth 2 (two) times daily as needed for Heartburn. 60 tablet 1    furosemide (LASIX) 20 MG tablet Take 1 tablet (20 mg total) by mouth once daily.  "90 tablet 3    glimepiride (AMARYL) 2 MG tablet Take 2 tablets (4 mg total) by mouth daily with breakfast. 180 tablet 0    insulin aspart U-100 (NOVOLOG FLEXPEN U-100 INSULIN) 100 unit/mL (3 mL) InPn pen Inject as needed before meals: 180-230=+1, 231-280=+2, 281-330=+3, 331-380=+4, over 380=+5 units 15 mL 0    latanoprost, PF, 0.005 % Drop 1 drop into affected eye in the evening Ophthalmic Once a day      metFORMIN (GLUCOPHAGE-XR) 500 MG ER 24hr tablet Take 2 tablets (1,000 mg total) by mouth 2 (two) times daily with meals. 360 tablet 0    metoprolol succinate (TOPROL-XL) 50 MG 24 hr tablet Take 1 tablet (50 mg total) by mouth once daily. 90 tablet 0    mirabegron (MYRBETRIQ) 25 mg Tb24 ER tablet Take 1 tablet (25 mg total) by mouth once daily. 30 tablet 11    mycophenolate (CELLCEPT) 250 mg Cap Take 4 capsules (1,000 mg total) by mouth 2 (two) times daily. 240 capsule 11    nitroGLYCERIN (NITROSTAT) 0.3 MG SL tablet Place 1 tablet (0.3 mg total) under the tongue every 5 (five) minutes as needed for Chest pain. 30 tablet 0    omeprazole (PRILOSEC) 40 MG capsule Take 40 mg by mouth once daily.      ONETOUCH DELICA PLUS LANCET 33 gauge Misc Apply topically 3 (three) times daily.      pen needle, diabetic 32 gauge x 5/32" Ndle 1 each by Misc.(Non-Drug; Combo Route) route once daily. 100 each 0    [] predniSONE (DELTASONE) 20 MG tablet Take 3 tablets (60 mg total) by mouth once daily. 90 tablet 0    rosuvastatin (CRESTOR) 20 MG tablet Take 1 tablet (20 mg total) by mouth once daily. 90 tablet 3    sacubitriL-valsartan (ENTRESTO) 24-26 mg per tablet Take 1 tablet by mouth 2 (two) times daily. 180 tablet 3    sucralfate (CARAFATE) 1 gram tablet Take 1 tablet (1 g total) by mouth 4 (four) times daily before meals and nightly. 120 tablet 2    levoFLOXacin (LEVAQUIN) 500 MG tablet Take 1 tablet (500 mg total) by mouth once daily. for 7 days 7 tablet 0    nintedanib (OFEV) 150 mg Cap Take 1 capsule (150 mg total) by " mouth 2 (two) times a day. 60 capsule 11    predniSONE (DELTASONE) 20 MG tablet Take 2 tablets (40 mg total) by mouth once daily for 30 days, THEN 1 tablet (20 mg total) once daily for 30 days, THEN 0.5 tablets (10 mg total) once daily. 105 tablet 0    sulfamethoxazole-trimethoprim 800-160mg (BACTRIM DS) 800-160 mg Tab Take 1 tablet by mouth once daily. 30 tablet 4     No facility-administered encounter medications on file as of 2/17/2025.

## 2025-02-18 PROBLEM — J84.112 IPF (IDIOPATHIC PULMONARY FIBROSIS): Status: ACTIVE | Noted: 2025-02-18

## 2025-02-21 NOTE — ASSESSMENT & PLAN NOTE
Significant chronic condition to be followed longitudinally with following long term treatment plan.    Severe progressive disease with severe dyspnea at baseline. Placed on Cellcept with some symptom improvement. Pattern and clinical progression consistent with IPF.  -attempted PFTs but unable to maintain breathhold. I reviewed flow volume loops and consistent with severe restrictive lung disease with maximal FVC of 1L.  -Start Ofev 150 mg BID  -Continue CellCept  -Wean off Prednsione with slow taper  -Bactrim Ppx while on high dose prednisone.  -Have discussed palliative care with patient  -attempted to discuss same day testing with patient but he did not answer and voicemail is full. No answer on son's phone either

## 2025-02-26 ENCOUNTER — OFFICE VISIT (OUTPATIENT)
Dept: ENDOCRINOLOGY | Facility: CLINIC | Age: 75
End: 2025-02-26
Payer: MEDICARE

## 2025-02-26 VITALS
WEIGHT: 123.19 LBS | BODY MASS INDEX: 18.73 KG/M2 | HEART RATE: 100 BPM | TEMPERATURE: 97 F | SYSTOLIC BLOOD PRESSURE: 128 MMHG | DIASTOLIC BLOOD PRESSURE: 68 MMHG

## 2025-02-26 DIAGNOSIS — E11.65 TYPE 2 DIABETES MELLITUS WITH HYPERGLYCEMIA, WITHOUT LONG-TERM CURRENT USE OF INSULIN: Primary | ICD-10-CM

## 2025-02-26 DIAGNOSIS — R73.9 STEROID-INDUCED HYPERGLYCEMIA: ICD-10-CM

## 2025-02-26 DIAGNOSIS — T38.0X5A STEROID-INDUCED HYPERGLYCEMIA: ICD-10-CM

## 2025-02-26 PROCEDURE — 99999 PR PBB SHADOW E&M-EST. PATIENT-LVL V: CPT | Mod: PBBFAC,HCNC,, | Performed by: NURSE PRACTITIONER

## 2025-02-26 RX ORDER — METFORMIN HYDROCHLORIDE 500 MG/1
500 TABLET, EXTENDED RELEASE ORAL 2 TIMES DAILY WITH MEALS
Qty: 180 TABLET | Refills: 0 | Status: SHIPPED | OUTPATIENT
Start: 2025-02-26 | End: 2026-02-26

## 2025-02-26 RX ORDER — GLIMEPIRIDE 4 MG/1
4 TABLET ORAL
Qty: 90 TABLET | Refills: 2 | Status: SHIPPED | OUTPATIENT
Start: 2025-02-26 | End: 2026-02-26

## 2025-02-26 RX ORDER — BLOOD-GLUCOSE SENSOR
EACH MISCELLANEOUS
Qty: 2 EACH | Refills: 11 | Status: SHIPPED | OUTPATIENT
Start: 2025-02-26

## 2025-02-26 NOTE — PATIENT INSTRUCTIONS
Continue metformin  mg tablet twice daily.     Resume glimepiride 4 mg tablet ONCE daily.     Take Lantus 5 units once daily for the rest of this week, then stop.   Patient will reduce prednisone to 2 tablets in 2 days.     Continue the metformin and glimepiride.     Avoid high carbohydrate foods like pancakes and honey     Will check on pt's Hola readings next week and call patient.

## 2025-02-26 NOTE — PROGRESS NOTES
CC: This 74 y.o.  male  is here for evaluation of  T2DM along with comorbidities indicated in the Visit Diagnosis section of this encounter.    HPI: Emmanuel Grant was diagnosed with T2DM around age 47. Metformin started at the time of diagnosis.   Med hx: CHF, interstitial lung disease, h/o CVA with left sided weakness     DM COMPLICATIONS: cardiovascular disease        Prior visit 1/28/25  Pt returns back today again d/t steroid-induced hyperglycemia. He no-showed last visit in Nov. He has been admitted multiple times for ILD, treated with prednisone.   Currently taking prednisone 40 mg once daily. Has appt with Pulmonary next week. Feels hungry all the time.   Pt adjusts his DM meds to avoid lows.   Pt adjusts metformin from 1/2 to 1 tablet once to twice daily depending on his BG, to avoid lows. Occasional nausea. No diarrhea.   Pt takes glimepiride 1/2 to 1 tablet once daily (never bid as rx'd), often skips. Pt takes Lantus 0-10 units once daily.   Yesterday, pt took metformin 1000 mg bid, skipped glimepiride, and took Lantus 4-5 units at night.   Plan Pt does not require basal insulin. Despite high dose steroid treatment, pt may be able to achieve glucose control without prandial insulin if he can remain adherent on metformin and glimepiride.   Advised pt -  Change metformin to metformin  mg tablets, which is more tolerable.   Take 2 tablets twice daily with food.   Metformin does not drop blood sugars too low. Do not adjust metformin dose based off blood sugar reading.   Stop Lantus.   Make sure to take glimepiride 4 mg once daily before 1st meal of the day with metformin. Do not skip.  -- if steroid dose is reduced, then glimepiride needs to be reduced/stopped as well.   Inject Novolog as needed if blood sugar is high before a meal:  180-230=+1, 231-280=+2, 281-330=+3, 331-380=+4, over 380=+5 units  Avoid beverages with sugar.   Return to clinic in 1 month.         Interval hx  He continues to be  "on prednisone, currently on 60 mg with taper plan. Glucoses are much higher. He takes 60 mg x 2 more days then reduces to 40 mg x 30 days.   C/o severe visual impairment.     Pt has not been taking glimepiride 4 mg. Does not recall his last dose. He didn't get it from the pharmacy.   He last took metformin yesterday and needs additional refill. Nausea remains stable.     Pt has a very difficult time with getting to his multiple office visits d/t visual impairment and [place]. Previously declined virtual visits d/t unfamiliarity with technology.       LAST DIABETES EDUCATION: never       HOSPITALIZED FOR DIABETES -  No. Went to ED 4/2024 for hypoglycemia (BG 51) on glipizide     SIGNIFICANT DIABETES MED HISTORY:   Farxiga - "knocked me down," LOC    PRESCRIBED DIABETES MEDICATIONS:   Glimepiride 4 mg once daily    metformin   mg bid     Novolog ss: 180-230=+1, 231-280=+2, 281-330=+3, 331-380=+4, over 380=+5 units        Misses medication doses - No        SELF MONITORING BLOOD GLUCOSE: Hola 3 CGM aubrey     CGM interpretation:  global persistent hyperglycemia secondary to high dose steroids and lack of medication (glimepiride).               HYPOGLYCEMIC EPISODES: none recent  but has had lows overnight.     CURRENT DIET: drinks water. Eats 3 meals/day. Snacks on fruit after dinner.   No coffee.     Diet recall: lunch was chicken and white beans. Breakfast was 2 pancakes with honey; dinner was soup with meat         CURRENT EXERCISE:       /68 (BP Location: Left arm, Patient Position: Sitting)   Pulse 100   Temp 97.3 °F (36.3 °C)   Wt 55.9 kg (123 lb 3.2 oz)   BMI 18.73 kg/m²     ROS:   CONSTITUTIONAL: Appetite good, + fatigue  GI: + nausea; no diarrhea   : + urinary frequency      PHYSICAL EXAM:  GENERAL: Well developed, well nourished. No acute distress. Appears chronically ill.   PSYCH: AAOx3,  conversant, well-groomed. Judgement and insight good. Anxious   NEURO: Cranial nerves grossly intact. Speech " "clear.   CHEST: Respirations even and labored.       Hemoglobin A1C   Date Value Ref Range Status   01/02/2025 7.8 (H) 4.0 - 5.6 % Final     Comment:     ADA Screening Guidelines:  5.7-6.4%  Consistent with prediabetes  >or=6.5%  Consistent with diabetes    High levels of fetal hemoglobin interfere with the HbA1C  assay. Heterozygous hemoglobin variants (HbS, HgC, etc)do  not significantly interfere with this assay.   However, presence of multiple variants may affect accuracy.     09/25/2024 8.0 (H) 4.0 - 5.6 % Final     Comment:     ADA Screening Guidelines:  5.7-6.4%  Consistent with prediabetes  >or=6.5%  Consistent with diabetes    High levels of fetal hemoglobin interfere with the HbA1C  assay. Heterozygous hemoglobin variants (HbS, HgC, etc)do  not significantly interfere with this assay.   However, presence of multiple variants may affect accuracy.     08/05/2024 8.3 (H) 4.0 - 5.6 % Final     Comment:     ADA Screening Guidelines:  5.7-6.4%  Consistent with prediabetes  >or=6.5%  Consistent with diabetes    High levels of fetal hemoglobin interfere with the HbA1C  assay. Heterozygous hemoglobin variants (HbS, HgC, etc)do  not significantly interfere with this assay.   However, presence of multiple variants may affect accuracy.         No results found for: "CPEPTIDE", "GLUTAMICACID", "ISLETCELLANT", "FRUCTOSAMINE"     Lab Results   Component Value Date    CHOL 103 (L) 10/18/2024    CHOL 109 (L) 05/07/2024     Lab Results   Component Value Date    HDL 32 (L) 10/18/2024    HDL 39 (L) 05/07/2024     Lab Results   Component Value Date    LDLCALC 55.4 (L) 10/18/2024    LDLCALC 58.0 (L) 05/07/2024     Lab Results   Component Value Date    TRIG 78 10/18/2024    TRIG 60 05/07/2024     Lab Results   Component Value Date    CHOLHDL 31.1 10/18/2024    CHOLHDL 35.8 05/07/2024           Component Value Date/Time     02/17/2025 1244    K 3.6 02/17/2025 1244    CL 94 (L) 02/17/2025 1244    CO2 29 02/17/2025 1244    " BUN 21 02/17/2025 1244    CREATININE 1.2 02/17/2025 1244     (H) 02/17/2025 1244    CALCIUM 9.9 02/17/2025 1244    ALKPHOS 55 02/17/2025 1244    AST 67 (H) 02/17/2025 1244    ALT 49 (H) 02/17/2025 1244    BILITOT 0.7 02/17/2025 1244    EGFRNORACEVR >60.0 02/17/2025 1244         Lab Results   Component Value Date    LABMICR 601.0 05/07/2024    CREATRANDUR 83.0 05/07/2024    MICALBCREAT 724.1 (H) 05/07/2024                ASSESSMENT and PLAN:    A1C GOAL:     1. Type 2 diabetes mellitus with hyperglycemia, without long-term current use of insulin  Diabetes control complicated by lack of diabetic medication compounded by  increased dose of prednisone.   Continue metformin  mg tablet twice daily.     Resume glimepiride 4 mg tablet ONCE daily.     Take Lantus 5 units once daily for the rest of this week, then stop.   Patient will reduce prednisone to 2 tablets in 2 days.     Continue the metformin and glimepiride.     Avoid high carbohydrate foods like pancakes and honey     Will check on pt's Hola readings next week and call patient.     Rtc in 6 wks, virtual visit. Pt reluctant to do virtual visit but he will try it for the next visit. The [employer] aubrey was downloaded on his phone and he was instructed on how to find the aubrey, log in, and join a virtual visit.   Written instructions re: virtual visit also provided.         2. Steroid-induced hyperglycemia                No orders of the defined types were placed in this encounter.       Follow up in about 6 weeks (around 4/9/2025).

## 2025-02-27 ENCOUNTER — OFFICE VISIT (OUTPATIENT)
Dept: PULMONOLOGY | Facility: CLINIC | Age: 75
End: 2025-02-27
Payer: MEDICARE

## 2025-02-27 ENCOUNTER — TELEPHONE (OUTPATIENT)
Dept: TRANSPLANT | Facility: CLINIC | Age: 75
End: 2025-02-27
Payer: MEDICARE

## 2025-02-27 VITALS
HEART RATE: 118 BPM | WEIGHT: 127.19 LBS | HEIGHT: 68 IN | DIASTOLIC BLOOD PRESSURE: 70 MMHG | SYSTOLIC BLOOD PRESSURE: 122 MMHG | BODY MASS INDEX: 19.28 KG/M2 | OXYGEN SATURATION: 92 %

## 2025-02-27 DIAGNOSIS — D84.821 IMMUNOSUPPRESSION DUE TO DRUG THERAPY: ICD-10-CM

## 2025-02-27 DIAGNOSIS — J96.11 CHRONIC HYPOXEMIC RESPIRATORY FAILURE: ICD-10-CM

## 2025-02-27 DIAGNOSIS — K21.9 GASTROESOPHAGEAL REFLUX DISEASE WITHOUT ESOPHAGITIS: ICD-10-CM

## 2025-02-27 DIAGNOSIS — J84.112 IPF (IDIOPATHIC PULMONARY FIBROSIS): Primary | ICD-10-CM

## 2025-02-27 DIAGNOSIS — Z79.899 IMMUNOSUPPRESSION DUE TO DRUG THERAPY: ICD-10-CM

## 2025-02-27 PROBLEM — J96.01 ACUTE HYPOXIC RESPIRATORY FAILURE: Status: RESOLVED | Noted: 2024-09-23 | Resolved: 2025-02-27

## 2025-02-27 PROCEDURE — 99999 PR PBB SHADOW E&M-EST. PATIENT-LVL V: CPT | Mod: PBBFAC,HCNC,, | Performed by: INTERNAL MEDICINE

## 2025-02-27 PROCEDURE — 3078F DIAST BP <80 MM HG: CPT | Mod: HCNC,CPTII,S$GLB, | Performed by: INTERNAL MEDICINE

## 2025-02-27 PROCEDURE — 3074F SYST BP LT 130 MM HG: CPT | Mod: HCNC,CPTII,S$GLB, | Performed by: INTERNAL MEDICINE

## 2025-02-27 PROCEDURE — 3008F BODY MASS INDEX DOCD: CPT | Mod: HCNC,CPTII,S$GLB, | Performed by: INTERNAL MEDICINE

## 2025-02-27 PROCEDURE — 1101F PT FALLS ASSESS-DOCD LE1/YR: CPT | Mod: HCNC,CPTII,S$GLB, | Performed by: INTERNAL MEDICINE

## 2025-02-27 PROCEDURE — 1126F AMNT PAIN NOTED NONE PRSNT: CPT | Mod: HCNC,CPTII,S$GLB, | Performed by: INTERNAL MEDICINE

## 2025-02-27 PROCEDURE — 3288F FALL RISK ASSESSMENT DOCD: CPT | Mod: HCNC,CPTII,S$GLB, | Performed by: INTERNAL MEDICINE

## 2025-02-27 PROCEDURE — 99213 OFFICE O/P EST LOW 20 MIN: CPT | Mod: HCNC,S$GLB,, | Performed by: INTERNAL MEDICINE

## 2025-02-27 PROCEDURE — 1159F MED LIST DOCD IN RCRD: CPT | Mod: HCNC,CPTII,S$GLB, | Performed by: INTERNAL MEDICINE

## 2025-02-27 PROCEDURE — 3051F HG A1C>EQUAL 7.0%<8.0%: CPT | Mod: HCNC,CPTII,S$GLB, | Performed by: INTERNAL MEDICINE

## 2025-02-27 NOTE — ASSESSMENT & PLAN NOTE
Hypoxic respiratory failure secondary to his severe ILD   Currently requiring 3 L at all times occasionally we will bump it up a little bit at home.    This will be unable to be weaned for the rest of his life

## 2025-02-27 NOTE — PROGRESS NOTES
Reason for visit:  ILD    Patient ID:  Emmanuel Grant is a 74 y.o. male    Interval history 2/27/25  After our last visit we sent him to the hospital and he was admitted for a few days but without any findings of what caused this acute exacerbation.  He was admitted again after that and then saw Dr. Fannie [facility] who re-initiated his steroid taper and started PJP prophylaxis.  He continues to have extreme dyspnea on exertion and intermittently worsening hypoxia.  He still lives alone and manages his own medications and drives himself.  He does have grown children but 1 son lives in [place] and 1 lives in [place] so he does not have anyone local that can help him very much.    Interval History 12/12/24  Since our last visit he has been in and out of the hospital a couple of times.  He is now having palliative home care and they saw him on Tuesday and were concerned for his work of breathing.  He is here today and says that since Saturday his shortness of breath has progressively gotten worse.  He has needed his oxygen constantly and at higher levels and his breathing is very short and he feels like he can not catch his breath.  He also has had fevers and chills over the last couple of days.  He is tachycardic, tachypneic, and hypoxic so sending him to the ER for an acute evaluation    Interval History 9/23/24  Mr. Grant is feeling much better since completing the steroid taper. He is still short of breath and having coughing spells but overall feels better. He is also following with GI and possible having surgery to correct his hiatal hernia and improve his GERD symptoms.     Interval History   Since our last visit he has continued to have significant shortness of breath.  He unfortunately has also had severe acid reflux and stomach pains that are causing him significant discomfort and making his shortness of breath worse.  He has not really noted any improvement since starting inhalers or any of  "our last treatment.  He does not see GI again until August 10th.  But he is getting progressively worse.     History:  Mr. Grant  is a 73-year-old with newly diagnosed interstitial lung disease he has been admitted at the [facility] twice for shortness of breath and was sent home this last time with oxygen.  He had initial autoimmune evaluation that has been negative thus far.  No history of exposure to medications that would cause this that he is aware of, no family history of lung disease.  No previous heart disease.  He does get short of breath with exertion and moving around a good bit but is mostly able to do what he wants to do throughout the day.  He does use a nebulizer he feels that it does seem to help some.    Additional Pulmonary History:  Childhood Illnesses:  none  Occupational:   works in air conditioning in installation and repair  Environmental:   no pets, no seasonal allergies, no carpet in his home and no concern for mold or allergy exposures there  Tobacco/Smoking:   never smoker    Objective:     Vitals:    02/27/25 1342   BP: 122/70   BP Location: Right arm   Patient Position: Sitting   Pulse: (!) 118   SpO2: (!) 92%   Weight: 57.7 kg (127 lb 3.3 oz)   Height: 5' 8" (1.727 m)         Physical Exam  Constitutional:       General: He is not in acute distress.     Appearance: He is not diaphoretic.   HENT:      Head: Normocephalic and atraumatic.      Right Ear: External ear normal.      Left Ear: External ear normal.   Eyes:      Conjunctiva/sclera: Conjunctivae normal.      Pupils: Pupils are equal, round, and reactive to light.   Neck:      Trachea: No tracheal deviation.   Cardiovascular:      Rate and Rhythm: Normal rate and regular rhythm.      Heart sounds: Normal heart sounds. No murmur heard.  Pulmonary:      Effort: Pulmonary effort is normal. No respiratory distress.      Breath sounds: No stridor. Rales present. No wheezing.   Abdominal:      General: Bowel sounds are normal. There is " no distension.      Palpations: Abdomen is soft.      Tenderness: There is no abdominal tenderness.   Musculoskeletal:         General: Normal range of motion.      Cervical back: Normal range of motion and neck supple.   Skin:     General: Skin is warm and dry.      Findings: No erythema.   Neurological:      Mental Status: He is alert and oriented to person, place, and time.      Gait: Gait is intact.   Psychiatric:         Mood and Affect: Mood and affect normal.         Cognition and Memory: Memory normal.         Judgment: Judgment normal.          Personal Diagnostic Review and Interpretation   CT scans reviewed from the last 2 occurrences and do show in her lobular septal thick getting no evidence of clear UIP pattern at this time we will repeat in a few months      Pertinent Studies Reviewed & Interpreted:     Pulmonary Function Tests:   Pending     Echocardiograms:   Results for orders placed during the hospital encounter of 07/02/24    Echo    Interpretation Summary    Left Ventricle: The left ventricle is normal in size. Normal wall thickness. Regional wall motion abnormalities and Global hypokinesis present. See diagram for wall motion findings. There is mildly reduced systolic function with a visually estimated ejection fraction of 40 - 45%. Biplane (2D) method of discs ejection fraction is 43%. Global longitudinal strain is -13.0%.    Right Ventricle: Normal right ventricular cavity size. Wall thickness is normal. Systolic function is normal.    Aortic Valve: The aortic valve is a trileaflet valve. There is mild aortic valve sclerosis.    Mitral Valve: There is mild bileaflet sclerosis. There is mild mitral annular calcification present.    Pulmonic Valve: There is mild regurgitation.    Pulmonary Artery: The estimated pulmonary artery systolic pressure is 24 mmHg.    IVC/SVC: Normal venous pressure at 3 mmHg.          Assessment & Plan:       Problem List Items Addressed This Visit          Pulmonary     Chronic hypoxemic respiratory failure    Current Assessment & Plan   Hypoxic respiratory failure secondary to his severe ILD   Currently requiring 3 L at all times occasionally we will bump it up a little bit at home.    This will be unable to be weaned for the rest of his life         Relevant Orders    Ambulatory referral/consult to CLINIC Palliative Care    Ambulatory referral/consult to Transplant, Lung    IPF (idiopathic pulmonary fibrosis) - Primary    Current Assessment & Plan   His lung disease has continued to aggressively worsen.  He is continued on his steroids and CellCept and his steroids are being weaned down.  He saw Dr. Fannie dawson after his last discharge to assisted with reinitiating the weaning of his steroids and started PCP prophylaxis.    He continues to have extreme dyspnea on exertion without any relief from any of his current medications.  We discussed that there is no cure for his lung disease and then I would like him to see palliative care as well as the lung transplant clinic.  He is open to palliative care and assistance with his extreme symptoms and help with coping with this life-limiting illness.  He is more guarded about the transplant clinic but does wish to meet with them and here more at this time.         Relevant Orders    Ambulatory referral/consult to CLINIC Palliative Care    Ambulatory referral/consult to Transplant, Lung       Immunology/Multi System    Immunosuppression due to drug therapy    Current Assessment & Plan   On immunosuppressants recently started on PJP prophylaxis            GI    GERD (gastroesophageal reflux disease)    Current Assessment & Plan   Following with GI and on aggressive treatment              RETURN TO CLINIC IN 3 MONTHS       Portions of the record may have been created with voice-recognition software. Occasional wrong-word or sound-a-like substitutions may have occurred due to the inherent limitations of voice-recognition software. Read  the chart carefully and recognize, using context, where substitutions have occurred.    Naheed Schmitt M.D.  Pulmonary/Critical Care

## 2025-02-27 NOTE — ASSESSMENT & PLAN NOTE
His lung disease has continued to aggressively worsen.  He is continued on his steroids and CellCept and his steroids are being weaned down.  He saw Dr. Fannie dawson after his last discharge to assisted with reinitiating the weaning of his steroids and started PCP prophylaxis.    He continues to have extreme dyspnea on exertion without any relief from any of his current medications.  We discussed that there is no cure for his lung disease and then I would like him to see palliative care as well as the lung transplant clinic.  He is open to palliative care and assistance with his extreme symptoms and help with coping with this life-limiting illness.  He is more guarded about the transplant clinic but does wish to meet with them and here more at this time.

## 2025-02-28 ENCOUNTER — TELEPHONE (OUTPATIENT)
Dept: ALLERGY | Facility: CLINIC | Age: 75
End: 2025-02-28
Payer: MEDICARE

## 2025-02-28 ENCOUNTER — TELEPHONE (OUTPATIENT)
Dept: PALLIATIVE MEDICINE | Facility: CLINIC | Age: 75
End: 2025-02-28
Payer: MEDICARE

## 2025-02-28 ENCOUNTER — TELEPHONE (OUTPATIENT)
Dept: PULMONOLOGY | Facility: CLINIC | Age: 75
End: 2025-02-28
Payer: MEDICARE

## 2025-02-28 NOTE — TELEPHONE ENCOUNTER
An referral was placed for patient to be seen in palliative medicine left voicemail and provided clinic number for patient to call back

## 2025-02-28 NOTE — TELEPHONE ENCOUNTER
----- Message from Nurse Triana sent at 2/28/2025  2:06 PM CST -----    ----- Message -----  From: Lesly Wolf MA  Sent: 2/28/2025  12:21 PM CST  To: [facility] Allergy Clinical Staff    Type:  Patient Returning CallWho Called:ptWho Left Message for Patient:Minerva Bolanos the patient know what this is regarding?:has not received his Ofev Rx yetWould the patient rather a call back or a response via My [facility]?  Call backCallback Best Call Back Number:Telephone Information:Mobile          [phone] Additional Information:He found his phone and will await callback Thank you.

## 2025-02-28 NOTE — TELEPHONE ENCOUNTER
----- Message from Iesla sent at 2/28/2025  9:06 AM CST -----  Regarding: MERVAT FXO [[id number]]  Type: RX Request  Who Called:patient   Have you contacted your pharmacy: yes  Refill or New Rx:New (recommended)  RX Name and Strength: Patient called and stated that he is a candidate for a lung surgery, and he needs his medication from [facility] pharmacy (patient cannot remember the name of this of this medication for lungs as recommended) and cannot have this procedure done without this medication. Patient say's this is urgent and needs a call back as soon as possible. Preferred Pharmacy with phone number:[facility] pharmacy:[phone]  Local or Mail Order: local  Ordering Provider: Naheed Schmitt  Would the patient rather a call back or a response via My [facility]? Call back   Best Call Back Number: [phone] Additional Information: Patient called and stated that he is a candidate for a lung surgery, and he needs his medication from [facility] (patient cannot remember the name of this of this medication for lungs as recommended) and cannot have this procedure done without this medication. Patient say's this and urgent and needs a call back as soon as possible.  ----- Message -----  From: Isela Cabrera  Sent: 2/28/2025   9:13 AM CST  To: [facility] Staff  Subject: MERVAT FOX [[id number]]                       Type: RX Request  Who Called:patient   Have you contacted your pharmacy: yes  Refill or New Rx:New (recommended)  RX Name and Strength: Patient called and stated that he is a candidate for a lung surgery, and he needs his medication from [facility] pharmacy (patient cannot remember the name of this of this medication for lungs as recommended) and cannot have this procedure done without this medication. Patient say's this is urgent and needs a call back as soon as possible. Preferred Pharmacy with phone number:[facility] pharmacy:[phone]  Local or Mail Order: local  Ordering  Provider: Naheed Schmitt  Would the patient rather a call back or a response via My [facility]?CALL BACK   Best Call Back Number: [phone] Additional Information: Patient called and stated that he is a candidate for a lung surgery, and he needs his medication from [facility] (patient cannot remember the name of this of this medication for lungs as recommended) and cannot have this procedure done without this medication. Patient say's this and urgent and needs a call back as soon as possible.

## 2025-02-28 NOTE — TELEPHONE ENCOUNTER
Spoke with patient, informed him that I have received his message. Pt states that yesterday at his appointment him and Dr Schmitt spoke about a medication patient needs to complete the procedure he suppose to have. Pt states that the procedure is not scheduled but Dr Schmitt advised him on yesterday that she will get back with him. I verbalized to pt that I understand and advised pt that I will forward message to Dr Schmitt to review/advise.

## 2025-02-28 NOTE — TELEPHONE ENCOUNTER
I attempted to return Mr. Grant's phone call but was unable to leave message.    Mr. Grant should call [facility] pharmacy ([phone]) to schedule delivery for his Ofev Rx since they have been trying to reach him.    Josef Bolanos, PharmD  Clinical Pharmacist - Allergy/Pulmonary    New Patient Chart Prep    Juhi Posada  is scheduled to see Dane Harrington 

## 2025-02-28 NOTE — TELEPHONE ENCOUNTER
"3/11/25 - Received financial clearance. Attempted to contact patient. No answer and unable to leave a message due to his voicemail box being full.    2/28/25 - Message and clinicals sent to Billie for financial clearance for consult with PFTs and a 6 minute walk test.    ----- Message from Karen Sharif DO sent at 2/28/2025  1:54 PM CST -----    Regarding: RE: Lung transplant referral    OK to see in LUT clinic.  Needs PFTs and 6MWT.  Appears that social support may be an issue    ----- Message -----  From: Arminda Knight RN  Sent: 2/27/2025   3:28 PM CST  To: Karen Sharif DO  Subject: Lung transplant referral                         Lung Transplant Referral NoteReferral from: Otoniel Jon diagnosis: IIP: Idiopathic Pulmonary Fibrosis (IPF), Age: 74 y.o.Height/Weight/BMI:  HT: 5'8" / WT: 57.7 kg / Body mass index is 19.34 kg/m².Smoking history: Social History  Tobacco Use    Smoking status: Never      Passive exposure: Never    Smokeless tobacco: NeverPFT date: 02/12/2025 (unable to complete)6MWT date: 07/02/2024800 feet on 2 liter of oxygenCXR date: 02/17/2025Impression: Diffuse increased interstitial markings are seen throughout both lung fields.The overall pattern is similar to yesterday's exam. No superimposed infiltrate is seen. No evidence of pleural effusion.Chest CTA date: 01/16/2025Impression:   No evidence of pulmonary embolism. Chronic interstitial lung disease likely representing IUP.  Findings are similar compared to prior exam.Echo date: 10/17/2024  Impression:·  Left Ventricle: Regional wall motion abnormalities present. There is normal systolic function with a visually estimated ejection fraction of 55 %. Grade I diastolic dysfunction. federico septal hypokinesis·  Right Ventricle: Normal right ventricular cavity size. Systolic function is normal.·  Left Atrium: Left atrium is mildly dilated.·  IVC/SVC: Normal venous pressure at 3 mmHg.Other pertinent medical history: CAD " (stent); CVA - left sided weakness; chronic HFrEF; DM (type II); HTN; kidney stoneMultiple hospitalizations in the last several monthsLives alone; limited social support - adult children live out of stateRecommendations?

## 2025-02-28 NOTE — TELEPHONE ENCOUNTER
Mr. Grant has not received his Ofev Rx yet, so I contacted [facility] to determine if there were any issues.    The representative (Scarlet) stated that they had unsuccessfully attempted to contact Mr. Grant regarding his prescription, so I shared Mr. Grant's son's number with her as an alternate contact option.    She stated she will reach out again and attempt to contact his son if unable to reach him.      Additionally I relayed the secondary billing information from the sebastián that [facility] found for him to [facility].    Josef Bolanos, PharmD  Clinical Pharmacist - Allergy/Pulmonary

## 2025-03-03 ENCOUNTER — TELEPHONE (OUTPATIENT)
Dept: ENDOCRINOLOGY | Facility: CLINIC | Age: 75
End: 2025-03-03
Payer: MEDICARE

## 2025-03-03 NOTE — TELEPHONE ENCOUNTER
----- Message from Dulce Maria sent at 3/3/2025  2:19 PM CST -----  Regarding: self  Who Called: selfSymptoms (please be specific): pt is feeling dizziness he thinks it is from metFORMIN (GLUCOPHAGE-XR) 500 MG ER 24hr tabletHow long has patient had these symptoms:  3 days nowPharmacy: [facility] [address] [place] [address] EXP AT [address] [address]Oqvyk: [phone] Fax: [phone] the patient rather a call back or a response via [facility]?Best Call Back Number: [phone] Information:Symptom: DizzinessOutcome: Instruct patient to Call 911 NOW!Reason: Can't stand (unless normally can't stand)  ----- Message -----  From: Dulce Maria Spicer  Sent: 3/3/2025   2:20 PM CST  To: [facility] Staff  Subject: self                                             Who Called: selfSymptoms (please be specific): pt is feeling dizziness he thinks it is from metFORMIN (GLUCOPHAGE-XR) 500 MG ER 24hr tabletHow long has patient had these symptoms:  3 days nowPharmacy: [facility] #[id number] [place] [address] EXP AT [address] [address]Gkhcr: [phone] Fax: [phone] the patient rather a call back or a response via [facility]?Best Call Back Number: [phone] Information:

## 2025-03-03 NOTE — TELEPHONE ENCOUNTER
Called pt to inform him that provider is out of the office. And to advise he needs to get evaluated at the emergency room if this has been going on for 3 days. No answer, unable to lvm due to it being full.

## 2025-03-05 ENCOUNTER — TELEPHONE (OUTPATIENT)
Dept: ENDOCRINOLOGY | Facility: CLINIC | Age: 75
End: 2025-03-05
Payer: MEDICARE

## 2025-03-06 ENCOUNTER — TELEPHONE (OUTPATIENT)
Dept: PALLIATIVE MEDICINE | Facility: CLINIC | Age: 75
End: 2025-03-06
Payer: MEDICARE

## 2025-03-06 ENCOUNTER — PATIENT MESSAGE (OUTPATIENT)
Dept: PALLIATIVE MEDICINE | Facility: CLINIC | Age: 75
End: 2025-03-06
Payer: MEDICARE

## 2025-03-06 ENCOUNTER — TELEPHONE (OUTPATIENT)
Dept: PALLIATIVE MEDICINE | Facility: CLINIC | Age: 75
End: 2025-03-06

## 2025-03-06 NOTE — TELEPHONE ENCOUNTER
An referral was placed for patient to be seen in palliative medicine left voicemail and provided clinic number for patient to call back (2nd attempt)

## 2025-03-07 ENCOUNTER — TELEPHONE (OUTPATIENT)
Dept: PULMONOLOGY | Facility: CLINIC | Age: 75
End: 2025-03-07
Payer: MEDICARE

## 2025-03-07 NOTE — TELEPHONE ENCOUNTER
----- Message from Cristian Isabel sent at 3/6/2025  2:58 PM CST -----  Regarding: FW: Patient Advice  Contact: Tammie [phone]    ----- Message -----  From: Marla Godinez  Sent: 3/6/2025  12:35 PM CST  To: [facility] Staff  Subject: Patient Advice                                   Tammie/ [facility] is calling to speak to staff about pt contact info please call

## 2025-03-07 NOTE — TELEPHONE ENCOUNTER
Attempt to return a call to Mrs Cho/ [facility] at [phone].  Unable to reach, not available at this time.  No voice message was left.

## 2025-03-16 ENCOUNTER — HOSPITAL ENCOUNTER (OUTPATIENT)
Facility: HOSPITAL | Age: 75
Discharge: HOME HEALTH | End: 2025-03-17
Attending: STUDENT IN AN ORGANIZED HEALTH CARE EDUCATION/TRAINING PROGRAM | Admitting: STUDENT IN AN ORGANIZED HEALTH CARE EDUCATION/TRAINING PROGRAM
Payer: MEDICARE

## 2025-03-16 DIAGNOSIS — R07.9 CHEST PAIN: ICD-10-CM

## 2025-03-16 PROBLEM — R74.01 TRANSAMINITIS: Status: ACTIVE | Noted: 2025-03-16

## 2025-03-16 LAB
ALBUMIN SERPL BCP-MCNC: 3.1 G/DL (ref 3.5–5.2)
ALP SERPL-CCNC: 143 U/L (ref 40–150)
ALT SERPL W/O P-5'-P-CCNC: 265 U/L (ref 10–44)
ANION GAP SERPL CALC-SCNC: 10 MMOL/L (ref 8–16)
APAP SERPL-MCNC: <3 UG/ML (ref 10–20)
AST SERPL-CCNC: 190 U/L (ref 10–40)
BACTERIA #/AREA URNS HPF: NORMAL /HPF
BASOPHILS # BLD AUTO: 0.02 K/UL (ref 0–0.2)
BASOPHILS NFR BLD: 0.3 % (ref 0–1.9)
BILIRUB SERPL-MCNC: 0.5 MG/DL (ref 0.1–1)
BILIRUB UR QL STRIP: NEGATIVE
BNP SERPL-MCNC: 105 PG/ML (ref 0–99)
BUN SERPL-MCNC: 13 MG/DL (ref 8–23)
CALCIUM SERPL-MCNC: 8.8 MG/DL (ref 8.7–10.5)
CHLORIDE SERPL-SCNC: 104 MMOL/L (ref 95–110)
CLARITY UR: CLEAR
CO2 SERPL-SCNC: 23 MMOL/L (ref 23–29)
COLOR UR: YELLOW
CREAT SERPL-MCNC: 1.2 MG/DL (ref 0.5–1.4)
D DIMER PPP IA.FEU-MCNC: 0.57 MG/L FEU
DIFFERENTIAL METHOD BLD: ABNORMAL
EOSINOPHIL # BLD AUTO: 0.4 K/UL (ref 0–0.5)
EOSINOPHIL NFR BLD: 6.1 % (ref 0–8)
ERYTHROCYTE [DISTWIDTH] IN BLOOD BY AUTOMATED COUNT: 13.6 % (ref 11.5–14.5)
EST. GFR  (NO RACE VARIABLE): >60 ML/MIN/1.73 M^2
GLUCOSE SERPL-MCNC: 192 MG/DL (ref 70–110)
GLUCOSE SERPL-MCNC: 84 MG/DL (ref 70–110)
GLUCOSE UR QL STRIP: NEGATIVE
HCT VFR BLD AUTO: 39.4 % (ref 40–54)
HGB BLD-MCNC: 13.4 G/DL (ref 14–18)
HGB UR QL STRIP: NEGATIVE
HYALINE CASTS #/AREA URNS LPF: 1 /LPF
IMM GRANULOCYTES # BLD AUTO: 0.02 K/UL (ref 0–0.04)
IMM GRANULOCYTES NFR BLD AUTO: 0.3 % (ref 0–0.5)
INR PPP: 1 (ref 0.8–1.2)
KETONES UR QL STRIP: NEGATIVE
LEUKOCYTE ESTERASE UR QL STRIP: NEGATIVE
LYMPHOCYTES # BLD AUTO: 1.3 K/UL (ref 1–4.8)
LYMPHOCYTES NFR BLD: 19 % (ref 18–48)
MCH RBC QN AUTO: 27.7 PG (ref 27–31)
MCHC RBC AUTO-ENTMCNC: 34 G/DL (ref 32–36)
MCV RBC AUTO: 82 FL (ref 82–98)
MICROSCOPIC COMMENT: NORMAL
MONOCYTES # BLD AUTO: 0.5 K/UL (ref 0.3–1)
MONOCYTES NFR BLD: 6.5 % (ref 4–15)
NEUTROPHILS # BLD AUTO: 4.7 K/UL (ref 1.8–7.7)
NEUTROPHILS NFR BLD: 67.8 % (ref 38–73)
NITRITE UR QL STRIP: NEGATIVE
NRBC BLD-RTO: 0 /100 WBC
OHS QRS DURATION: 94 MS
OHS QTC CALCULATION: 441 MS
PH UR STRIP: 7 [PH] (ref 5–8)
PLATELET # BLD AUTO: 186 K/UL (ref 150–450)
PMV BLD AUTO: 9.7 FL (ref 9.2–12.9)
POCT GLUCOSE: 160 MG/DL (ref 70–110)
POCT GLUCOSE: 84 MG/DL (ref 70–110)
POTASSIUM SERPL-SCNC: 4.3 MMOL/L (ref 3.5–5.1)
PROT SERPL-MCNC: 7.1 G/DL (ref 6–8.4)
PROT UR QL STRIP: ABNORMAL
PROTHROMBIN TIME: 11.1 SEC (ref 9–12.5)
RBC # BLD AUTO: 4.83 M/UL (ref 4.6–6.2)
RBC #/AREA URNS HPF: 3 /HPF (ref 0–4)
SODIUM SERPL-SCNC: 137 MMOL/L (ref 136–145)
SP GR UR STRIP: 1.02 (ref 1–1.03)
TROPONIN I SERPL DL<=0.01 NG/ML-MCNC: 0.01 NG/ML (ref 0–0.03)
URN SPEC COLLECT METH UR: ABNORMAL
UROBILINOGEN UR STRIP-ACNC: NEGATIVE EU/DL
WBC # BLD AUTO: 6.9 K/UL (ref 3.9–12.7)
WBC #/AREA URNS HPF: 1 /HPF (ref 0–5)

## 2025-03-16 PROCEDURE — 93005 ELECTROCARDIOGRAM TRACING: CPT | Mod: HCNC,NTX

## 2025-03-16 PROCEDURE — 93010 ELECTROCARDIOGRAM REPORT: CPT | Mod: HCNC,NTX,, | Performed by: INTERNAL MEDICINE

## 2025-03-16 PROCEDURE — G0378 HOSPITAL OBSERVATION PER HR: HCPCS | Mod: HCNC,NTX

## 2025-03-16 PROCEDURE — 99285 EMERGENCY DEPT VISIT HI MDM: CPT | Mod: 25,HCNC,NTX

## 2025-03-16 PROCEDURE — 84484 ASSAY OF TROPONIN QUANT: CPT | Mod: 91,HCNC,NTX | Performed by: STUDENT IN AN ORGANIZED HEALTH CARE EDUCATION/TRAINING PROGRAM

## 2025-03-16 PROCEDURE — 63600175 PHARM REV CODE 636 W HCPCS: Mod: HCNC,NTX | Performed by: HOSPITALIST

## 2025-03-16 PROCEDURE — 25000003 PHARM REV CODE 250: Mod: HCNC,NTX | Performed by: STUDENT IN AN ORGANIZED HEALTH CARE EDUCATION/TRAINING PROGRAM

## 2025-03-16 PROCEDURE — 83880 ASSAY OF NATRIURETIC PEPTIDE: CPT | Mod: HCNC,NTX | Performed by: STUDENT IN AN ORGANIZED HEALTH CARE EDUCATION/TRAINING PROGRAM

## 2025-03-16 PROCEDURE — 80307 DRUG TEST PRSMV CHEM ANLYZR: CPT | Mod: HCNC,NTX | Performed by: HOSPITALIST

## 2025-03-16 PROCEDURE — 96372 THER/PROPH/DIAG INJ SC/IM: CPT | Performed by: HOSPITALIST

## 2025-03-16 PROCEDURE — 82962 GLUCOSE BLOOD TEST: CPT | Mod: HCNC,NTX

## 2025-03-16 PROCEDURE — 85025 COMPLETE CBC W/AUTO DIFF WBC: CPT | Mod: HCNC,NTX | Performed by: STUDENT IN AN ORGANIZED HEALTH CARE EDUCATION/TRAINING PROGRAM

## 2025-03-16 PROCEDURE — 85610 PROTHROMBIN TIME: CPT | Mod: HCNC,NTX | Performed by: HOSPITALIST

## 2025-03-16 PROCEDURE — 25000003 PHARM REV CODE 250: Mod: HCNC,NTX | Performed by: HOSPITALIST

## 2025-03-16 PROCEDURE — 80053 COMPREHEN METABOLIC PANEL: CPT | Mod: HCNC,NTX | Performed by: STUDENT IN AN ORGANIZED HEALTH CARE EDUCATION/TRAINING PROGRAM

## 2025-03-16 PROCEDURE — 80143 DRUG ASSAY ACETAMINOPHEN: CPT | Mod: HCNC,NTX | Performed by: HOSPITALIST

## 2025-03-16 PROCEDURE — 84484 ASSAY OF TROPONIN QUANT: CPT | Mod: 91,HCNC,NTX | Performed by: HOSPITALIST

## 2025-03-16 PROCEDURE — 85379 FIBRIN DEGRADATION QUANT: CPT | Mod: HCNC,NTX | Performed by: STUDENT IN AN ORGANIZED HEALTH CARE EDUCATION/TRAINING PROGRAM

## 2025-03-16 PROCEDURE — 81000 URINALYSIS NONAUTO W/SCOPE: CPT | Mod: HCNC,NTX | Performed by: HOSPITALIST

## 2025-03-16 PROCEDURE — 80074 ACUTE HEPATITIS PANEL: CPT | Mod: HCNC,NTX | Performed by: HOSPITALIST

## 2025-03-16 RX ORDER — ENOXAPARIN SODIUM 100 MG/ML
40 INJECTION SUBCUTANEOUS EVERY 24 HOURS
Status: DISCONTINUED | OUTPATIENT
Start: 2025-03-16 | End: 2025-03-17 | Stop reason: HOSPADM

## 2025-03-16 RX ORDER — NALOXONE HCL 0.4 MG/ML
0.02 VIAL (ML) INJECTION
Status: DISCONTINUED | OUTPATIENT
Start: 2025-03-16 | End: 2025-03-17 | Stop reason: HOSPADM

## 2025-03-16 RX ORDER — IBUPROFEN 200 MG
24 TABLET ORAL
Status: DISCONTINUED | OUTPATIENT
Start: 2025-03-16 | End: 2025-03-17 | Stop reason: HOSPADM

## 2025-03-16 RX ORDER — ATORVASTATIN CALCIUM 40 MG/1
80 TABLET, FILM COATED ORAL DAILY
Status: DISCONTINUED | OUTPATIENT
Start: 2025-03-16 | End: 2025-03-17

## 2025-03-16 RX ORDER — ALUMINUM HYDROXIDE, MAGNESIUM HYDROXIDE, AND SIMETHICONE 1200; 120; 1200 MG/30ML; MG/30ML; MG/30ML
30 SUSPENSION ORAL 4 TIMES DAILY PRN
Status: DISCONTINUED | OUTPATIENT
Start: 2025-03-16 | End: 2025-03-17 | Stop reason: HOSPADM

## 2025-03-16 RX ORDER — GLUCAGON 1 MG
1 KIT INJECTION
Status: DISCONTINUED | OUTPATIENT
Start: 2025-03-16 | End: 2025-03-17 | Stop reason: HOSPADM

## 2025-03-16 RX ORDER — NITROGLYCERIN 0.3 MG/1
0.3 TABLET SUBLINGUAL EVERY 5 MIN PRN
Status: DISCONTINUED | OUTPATIENT
Start: 2025-03-16 | End: 2025-03-16 | Stop reason: CLARIF

## 2025-03-16 RX ORDER — AMOXICILLIN 250 MG
1 CAPSULE ORAL 2 TIMES DAILY
Status: DISCONTINUED | OUTPATIENT
Start: 2025-03-16 | End: 2025-03-17 | Stop reason: HOSPADM

## 2025-03-16 RX ORDER — BENZONATATE 100 MG/1
100 CAPSULE ORAL
Status: COMPLETED | OUTPATIENT
Start: 2025-03-16 | End: 2025-03-16

## 2025-03-16 RX ORDER — IPRATROPIUM BROMIDE AND ALBUTEROL SULFATE 2.5; .5 MG/3ML; MG/3ML
3 SOLUTION RESPIRATORY (INHALATION) EVERY 4 HOURS PRN
Status: DISCONTINUED | OUTPATIENT
Start: 2025-03-16 | End: 2025-03-17 | Stop reason: HOSPADM

## 2025-03-16 RX ORDER — METOPROLOL SUCCINATE 50 MG/1
50 TABLET, EXTENDED RELEASE ORAL DAILY
Status: DISCONTINUED | OUTPATIENT
Start: 2025-03-17 | End: 2025-03-17 | Stop reason: HOSPADM

## 2025-03-16 RX ORDER — MYCOPHENOLATE MOFETIL 250 MG/1
1000 CAPSULE ORAL 2 TIMES DAILY
Status: DISCONTINUED | OUTPATIENT
Start: 2025-03-16 | End: 2025-03-17 | Stop reason: HOSPADM

## 2025-03-16 RX ORDER — FUROSEMIDE 20 MG/1
20 TABLET ORAL DAILY
Status: DISCONTINUED | OUTPATIENT
Start: 2025-03-17 | End: 2025-03-17 | Stop reason: HOSPADM

## 2025-03-16 RX ORDER — ONDANSETRON HYDROCHLORIDE 2 MG/ML
4 INJECTION, SOLUTION INTRAVENOUS EVERY 8 HOURS PRN
Status: DISCONTINUED | OUTPATIENT
Start: 2025-03-16 | End: 2025-03-17 | Stop reason: HOSPADM

## 2025-03-16 RX ORDER — SIMETHICONE 80 MG
1 TABLET,CHEWABLE ORAL 4 TIMES DAILY PRN
Status: DISCONTINUED | OUTPATIENT
Start: 2025-03-16 | End: 2025-03-17 | Stop reason: HOSPADM

## 2025-03-16 RX ORDER — IBUPROFEN 200 MG
16 TABLET ORAL
Status: DISCONTINUED | OUTPATIENT
Start: 2025-03-16 | End: 2025-03-17 | Stop reason: HOSPADM

## 2025-03-16 RX ORDER — NAPROXEN SODIUM 220 MG/1
81 TABLET, FILM COATED ORAL DAILY
Status: DISCONTINUED | OUTPATIENT
Start: 2025-03-17 | End: 2025-03-17 | Stop reason: HOSPADM

## 2025-03-16 RX ORDER — NITROGLYCERIN 0.4 MG/1
0.4 TABLET SUBLINGUAL
Status: DISCONTINUED | OUTPATIENT
Start: 2025-03-16 | End: 2025-03-17 | Stop reason: HOSPADM

## 2025-03-16 RX ORDER — AMLODIPINE BESYLATE 5 MG/1
10 TABLET ORAL DAILY
Status: DISCONTINUED | OUTPATIENT
Start: 2025-03-17 | End: 2025-03-17 | Stop reason: HOSPADM

## 2025-03-16 RX ORDER — PROCHLORPERAZINE EDISYLATE 5 MG/ML
5 INJECTION INTRAMUSCULAR; INTRAVENOUS EVERY 6 HOURS PRN
Status: DISCONTINUED | OUTPATIENT
Start: 2025-03-16 | End: 2025-03-17 | Stop reason: HOSPADM

## 2025-03-16 RX ORDER — TRAZODONE HYDROCHLORIDE 50 MG/1
50 TABLET ORAL NIGHTLY PRN
Status: DISCONTINUED | OUTPATIENT
Start: 2025-03-16 | End: 2025-03-17 | Stop reason: HOSPADM

## 2025-03-16 RX ORDER — PANTOPRAZOLE SODIUM 40 MG/1
40 TABLET, DELAYED RELEASE ORAL DAILY
Status: DISCONTINUED | OUTPATIENT
Start: 2025-03-16 | End: 2025-03-17 | Stop reason: HOSPADM

## 2025-03-16 RX ORDER — ACETAMINOPHEN 325 MG/1
650 TABLET ORAL EVERY 4 HOURS PRN
Status: DISCONTINUED | OUTPATIENT
Start: 2025-03-16 | End: 2025-03-16

## 2025-03-16 RX ORDER — NAPROXEN SODIUM 220 MG/1
243 TABLET, FILM COATED ORAL
Status: COMPLETED | OUTPATIENT
Start: 2025-03-16 | End: 2025-03-16

## 2025-03-16 RX ORDER — INSULIN ASPART 100 [IU]/ML
0-10 INJECTION, SOLUTION INTRAVENOUS; SUBCUTANEOUS
Status: DISCONTINUED | OUTPATIENT
Start: 2025-03-16 | End: 2025-03-17 | Stop reason: HOSPADM

## 2025-03-16 RX ADMIN — ENOXAPARIN SODIUM 40 MG: 40 INJECTION SUBCUTANEOUS at 04:03

## 2025-03-16 RX ADMIN — PANTOPRAZOLE SODIUM 40 MG: 40 TABLET, DELAYED RELEASE ORAL at 04:03

## 2025-03-16 RX ADMIN — SENNOSIDES AND DOCUSATE SODIUM 1 TABLET: 50; 8.6 TABLET ORAL at 09:03

## 2025-03-16 RX ADMIN — ATORVASTATIN CALCIUM 80 MG: 40 TABLET, FILM COATED ORAL at 04:03

## 2025-03-16 RX ADMIN — SACUBITRIL AND VALSARTAN 1 TABLET: 24; 26 TABLET, FILM COATED ORAL at 09:03

## 2025-03-16 RX ADMIN — ASPIRIN 243 MG: 81 TABLET, CHEWABLE ORAL at 12:03

## 2025-03-16 RX ADMIN — MYCOPHENOLATE MOFETIL 1000 MG: 250 CAPSULE ORAL at 09:03

## 2025-03-16 RX ADMIN — BENZONATATE 100 MG: 100 CAPSULE ORAL at 02:03

## 2025-03-16 NOTE — ED NOTES
Pt present with c/o SOB, and chest heaviness after doing his exercise bike last night and trying to go for a walk this morning. Reports taking Beano with some relief for about 15 minutes, but then the heavy feeling came back. Is on home O2 at 4L. Right sided JVD noted. Pt is tachypneic. RR even and unlabored. AAOx4. Skin W/D/I. Side rails up, call bell within reach.

## 2025-03-16 NOTE — SUBJECTIVE & OBJECTIVE
Past Medical History:   Diagnosis Date    Acute hypoxic respiratory failure 09/23/2024    Oxygen therapy 24/7 now, continue 2 L oxygen therapy.      BPH (benign prostatic hyperplasia) 02/28/2024    CAD (coronary artery disease)     Sees [facility], h/o stent    Chronic HFrEF (heart failure with reduced ejection fraction) 05/01/2024    Diabetes mellitus     Hypertension     Kidney stone     Stroke     age 62    Type 2 diabetes mellitus without complication, without long-term current use of insulin 10/08/2021       Past Surgical History:   Procedure Laterality Date    24 HOUR IMPEDANCE PH MONITORING OF ESOPHAGUS IN PATIENT NOT TAKING ACID REDUCING MEDICATIONS N/A 11/13/2024    Procedure: IMPEDANCE PH STUDY, ESOPHAGEAL, 24 HOUR, IN PATIENT NOT TAKING ACID REDUCING MEDICATION;  Surgeon: Stephany Mendoza MD;  Location: [facility] (4TH FLR);  Service: Endoscopy;  Laterality: N/A;  referral dr miguel/ off ppi / prep inst sent to pt via mail  11/6- precall attempted no answer. Unable to [facility]-  11/8 - pt not called, per chart review patient currently inpatient at [place] [id number]    CARDIAC CATHETERIZATION      with stent    COLONOSCOPY N/A 03/27/2024    Procedure: COLONOSCOPY;  Surgeon: Ariela Castro MD;  Location: [facility];  Service: Endoscopy;  Laterality: N/A;    ESOPHAGEAL MANOMETRY WITH MEASUREMENT OF IMPEDANCE N/A 11/13/2024    Procedure: MANOMETRY, ESOPHAGUS, WITH IMPEDANCE MEASUREMENT;  Surgeon: Stephany Mendoza MD;  Location: [facility] (4TH FLR);  Service: Endoscopy;  Laterality: N/A;    ESOPHAGOGASTRODUODENOSCOPY N/A 03/27/2024    Procedure: EGD (ESOPHAGOGASTRODUODENOSCOPY);  Surgeon: Ariela Castro MD;  Location: [facility] ENDO;  Service: Endoscopy;  Laterality: N/A;    LEFT HEART CATHETERIZATION Left 9/10/2024    Procedure: Left heart cath;  Surgeon: Jemal Drummond MD;  Location: [facility] CATH LAB;  Service: Cardiology;  Laterality: Left;    SHOULDER SURGERY Right        Review of patient's allergies  "indicates:   Allergen Reactions    Dapagliflozin      Other reaction(s): Other (See Comments)    Pcn [penicillins]     Linagliptin Other (See Comments)     "it knocked me down", "it almost killed me"    Lisinopril Other (See Comments)     cough    Pantoprazole Hives       No current facility-administered medications on file prior to encounter.     Current Outpatient Medications on File Prior to Encounter   Medication Sig    albuterol-ipratropium (DUO-NEB) 2.5 mg-0.5 mg/3 mL nebulizer solution Take 3 mLs by nebulization every 6 (six) hours as needed for Wheezing. Rescue    amLODIPine (NORVASC) 10 MG tablet Take 1 tablet (10 mg total) by mouth once daily.    arformoteroL (BROVANA) 15 mcg/2 mL Nebu Take 2 mLs (15 mcg total) by nebulization 2 (two) times daily. Controller    aspirin 81 MG Chew Take 81 mg by mouth once daily.    blood-glucose sensor (FREESTYLE RAMBO 3 PLUS SENSOR) Blanche Change every 15 days    budesonide (PULMICORT) 0.5 mg/2 mL nebulizer solution Take 4 mLs (1 mg total) by nebulization 2 (two) times daily. Controller    cholecalciferol, vitamin D3, (VITAMIN D3) 50 mcg (2,000 unit) Cap capsule Take 1 capsule by mouth once daily.    famotidine (PEPCID) 20 MG tablet Take 1 tablet (20 mg total) by mouth 2 (two) times daily as needed for Heartburn.    furosemide (LASIX) 20 MG tablet Take 1 tablet (20 mg total) by mouth once daily.    glimepiride (AMARYL) 4 MG tablet Take 1 tablet (4 mg total) by mouth before breakfast.    insulin aspart U-100 (NOVOLOG FLEXPEN U-100 INSULIN) 100 unit/mL (3 mL) InPn pen Inject as needed before meals: 180-230=+1, 231-280=+2, 281-330=+3, 331-380=+4, over 380=+5 units    latanoprost, PF, 0.005 % Drop 1 drop into affected eye in the evening Ophthalmic Once a day    metFORMIN (GLUCOPHAGE-XR) 500 MG ER 24hr tablet Take 1 tablet (500 mg total) by mouth 2 (two) times daily with meals.    metoprolol succinate (TOPROL-XL) 50 MG 24 hr tablet Take 1 tablet (50 mg total) by mouth once daily. " "   mirabegron (MYRBETRIQ) 25 mg Tb24 ER tablet Take 1 tablet (25 mg total) by mouth once daily.    mycophenolate (CELLCEPT) 250 mg Cap Take 4 capsules (1,000 mg total) by mouth 2 (two) times daily.    nintedanib (OFEV) 150 mg Cap Take 1 capsule (150 mg total) by mouth 2 (two) times a day.    nitroGLYCERIN (NITROSTAT) 0.3 MG SL tablet Place 1 tablet (0.3 mg total) under the tongue every 5 (five) minutes as needed for Chest pain.    omeprazole (PRILOSEC) 40 MG capsule Take 40 mg by mouth once daily.    ONETOUCH DELICA PLUS LANCET 33 gauge Misc Apply topically 3 (three) times daily.    pen needle, diabetic 32 gauge x 5/32" Ndle 1 each by Misc.(Non-Drug; Combo Route) route once daily.    predniSONE (DELTASONE) 20 MG tablet Take 2 tablets (40 mg total) by mouth once daily for 30 days, THEN 1 tablet (20 mg total) once daily for 30 days, THEN 0.5 tablets (10 mg total) once daily.    rosuvastatin (CRESTOR) 20 MG tablet Take 1 tablet (20 mg total) by mouth once daily.    sacubitriL-valsartan (ENTRESTO) 24-26 mg per tablet Take 1 tablet by mouth 2 (two) times daily.    sucralfate (CARAFATE) 1 gram tablet Take 1 tablet (1 g total) by mouth 4 (four) times daily before meals and nightly.    sulfamethoxazole-trimethoprim 800-160mg (BACTRIM DS) 800-160 mg Tab Take 1 tablet by mouth once daily.     Family History       Problem Relation (Age of Onset)    Cancer Mother    Colon cancer Sister          Tobacco Use    Smoking status: Never     Passive exposure: Never    Smokeless tobacco: Never   Substance and Sexual Activity    Alcohol use: No    Drug use: Never    Sexual activity: Not Currently     Review of Systems   Constitutional:  Positive for activity change and fatigue. Negative for appetite change, chills, diaphoresis and fever.   HENT:  Negative for congestion, hearing loss and trouble swallowing.    Respiratory:  Positive for shortness of breath. Negative for cough, chest tightness and wheezing.    Cardiovascular:  Positive " for chest pain. Negative for palpitations and leg swelling.   Gastrointestinal:  Negative for abdominal distention, abdominal pain, constipation, diarrhea, nausea and vomiting.   Endocrine: Negative.    Genitourinary:  Negative for difficulty urinating and dysuria.   Skin: Negative.    Neurological:  Negative for syncope, facial asymmetry and speech difficulty.   Psychiatric/Behavioral:  Negative for confusion and hallucinations. The patient is not nervous/anxious.      Objective:     Vital Signs (Most Recent):  Temp: 98.1 °F (36.7 °C) (03/16/25 1058)  Pulse: 91 (03/16/25 1530)  Resp: (!) 22 (03/16/25 1530)  BP: (!) 148/97 (03/16/25 1530)  SpO2: 99 % (03/16/25 1530) Vital Signs (24h Range):  Temp:  [98.1 °F (36.7 °C)] 98.1 °F (36.7 °C)  Pulse:  [] 91  Resp:  [20-26] 22  SpO2:  [95 %-99 %] 99 %  BP: (103-148)/(76-97) 148/97        Body mass index is 19.34 kg/m².     Physical Exam  Vitals and nursing note reviewed.   Constitutional:       General: He is awake. He is not in acute distress.     Appearance: Normal appearance. He is well-developed and well-groomed. He is not ill-appearing, toxic-appearing or diaphoretic.      Interventions: Nasal cannula in place.   HENT:      Head: Normocephalic and atraumatic.   Cardiovascular:      Rate and Rhythm: Normal rate.   Pulmonary:      Effort: Pulmonary effort is normal. No tachypnea, accessory muscle usage or respiratory distress.   Neurological:      Mental Status: He is alert and oriented to person, place, and time. Mental status is at baseline.   Psychiatric:         Attention and Perception: Attention normal. He is attentive.         Mood and Affect: Mood normal. Mood is not anxious.         Speech: Speech normal.         Behavior: Behavior normal. Behavior is cooperative.         Thought Content: Thought content normal.         Cognition and Memory: Cognition and memory normal. Cognition is not impaired. Memory is not impaired. He does not exhibit impaired recent  "memory.         Judgment: Judgment normal.                Significant Labs: All pertinent labs within the past 24 hours have been reviewed.  CBC:   Recent Labs   Lab 03/16/25  1131   WBC 6.90   HGB 13.4*   HCT 39.4*        CMP:   Recent Labs   Lab 03/16/25  1131      K 4.3      CO2 23   *   BUN 13   CREATININE 1.2   CALCIUM 8.8   PROT 7.1   ALBUMIN 3.1*   BILITOT 0.5   ALKPHOS 143   *   *   ANIONGAP 10     Cardiac Markers:   Recent Labs   Lab 03/16/25  1131   *     Coagulation: No results for input(s): "PT", "INR", "APTT" in the last 48 hours.    Significant Imaging: I have reviewed all pertinent imaging results/findings within the past 24 hours.  "

## 2025-03-16 NOTE — H&P
[facility]  [facility] Medicine  History & Physical    Patient Name: Emmanuel Grant  MRN: [id number]  Admission Date: 3/16/2025  Attending Physician: Blanquita Hunt MD   Primary Care Provider: Wilfredo De Souza MD         Patient information was obtained from patient and ER records.       Subjective:     Principal Problem:Chest pain    Chief Complaint:   Chief Complaint   Patient presents with    Chest Pain     Pt to ED c/o chest heaviness after doing his bicycle exercise last night. Pt is on home O2 @ 4 L's.         HPI: History of Present Illness:  Patient is a 74 y.o. male who has a past medical history of interstitial lung disease with chronic hypoxic respiratory failure on 2 L O2 via NC at home,  BPH (benign prostatic hyperplasia), CAD (coronary artery disease), Chronic HFrEF (heart failure with reduced ejection fraction), Diabetes mellitus, Hypertension, Kidney stone, Stroke insulin presented with chest pain. Onset was 1 day ago, with worsening course since that time. The patient describes the pain as intermittent, pressure like in nature, does not radiate. Patient rates pain as a 5/10 in intensity.  Associated symptoms are dyspnea and fatigue. Aggravating factors are exercise.  Alleviating factors are is rest. He currently does not have any chest pain. Patient currently denies any fever/chills, weakness, numbness, abdominal pain, nausea/vomiting, dysuria/hematuria, or weight loss.     Past Medical History:   Diagnosis Date    Acute hypoxic respiratory failure 09/23/2024    Oxygen therapy 24/7 now, continue 2 L oxygen therapy.      BPH (benign prostatic hyperplasia) 02/28/2024    CAD (coronary artery disease)     Sees [facility], h/o stent    Chronic HFrEF (heart failure with reduced ejection fraction) 05/01/2024    Diabetes mellitus     Hypertension     Kidney stone     Stroke     age 62    Type 2 diabetes mellitus without complication, without long-term current use of insulin 10/08/2021       Past  "Surgical History:   Procedure Laterality Date    24 HOUR IMPEDANCE PH MONITORING OF ESOPHAGUS IN PATIENT NOT TAKING ACID REDUCING MEDICATIONS N/A 11/13/2024    Procedure: IMPEDANCE PH STUDY, ESOPHAGEAL, 24 HOUR, IN PATIENT NOT TAKING ACID REDUCING MEDICATION;  Surgeon: Stephany Mendoza MD;  Location: [facility] (4TH FLR);  Service: Endoscopy;  Laterality: N/A;  referral dr miguel/ off ppi / prep inst sent to pt via mail  11/6- precall attempted no answer. Unable to [facility]-  11/8 - pt not called, per chart review patient currently inpatient at [place] [id number]    CARDIAC CATHETERIZATION      with stent    COLONOSCOPY N/A 03/27/2024    Procedure: COLONOSCOPY;  Surgeon: Ariela Castro MD;  Location: [facility];  Service: Endoscopy;  Laterality: N/A;    ESOPHAGEAL MANOMETRY WITH MEASUREMENT OF IMPEDANCE N/A 11/13/2024    Procedure: MANOMETRY, ESOPHAGUS, WITH IMPEDANCE MEASUREMENT;  Surgeon: Stephany Mendoza MD;  Location: [facility] (4TH FLR);  Service: Endoscopy;  Laterality: N/A;    ESOPHAGOGASTRODUODENOSCOPY N/A 03/27/2024    Procedure: EGD (ESOPHAGOGASTRODUODENOSCOPY);  Surgeon: Ariela Castro MD;  Location: [facility];  Service: Endoscopy;  Laterality: N/A;    LEFT HEART CATHETERIZATION Left 9/10/2024    Procedure: Left heart cath;  Surgeon: Jemal Drummond MD;  Location: [facility] CATH LAB;  Service: Cardiology;  Laterality: Left;    SHOULDER SURGERY Right        Review of patient's allergies indicates:   Allergen Reactions    Dapagliflozin      Other reaction(s): Other (See Comments)    Pcn [penicillins]     Linagliptin Other (See Comments)     "it knocked me down", "it almost killed me"    Lisinopril Other (See Comments)     cough    Pantoprazole Hives       No current facility-administered medications on file prior to encounter.     Current Outpatient Medications on File Prior to Encounter   Medication Sig    albuterol-ipratropium (DUO-NEB) 2.5 mg-0.5 mg/3 mL nebulizer solution Take 3 mLs by " nebulization every 6 (six) hours as needed for Wheezing. Rescue    amLODIPine (NORVASC) 10 MG tablet Take 1 tablet (10 mg total) by mouth once daily.    arformoteroL (BROVANA) 15 mcg/2 mL Nebu Take 2 mLs (15 mcg total) by nebulization 2 (two) times daily. Controller    aspirin 81 MG Chew Take 81 mg by mouth once daily.    blood-glucose sensor (FREESTYLE RAMBO 3 PLUS SENSOR) Blanche Change every 15 days    budesonide (PULMICORT) 0.5 mg/2 mL nebulizer solution Take 4 mLs (1 mg total) by nebulization 2 (two) times daily. Controller    cholecalciferol, vitamin D3, (VITAMIN D3) 50 mcg (2,000 unit) Cap capsule Take 1 capsule by mouth once daily.    famotidine (PEPCID) 20 MG tablet Take 1 tablet (20 mg total) by mouth 2 (two) times daily as needed for Heartburn.    furosemide (LASIX) 20 MG tablet Take 1 tablet (20 mg total) by mouth once daily.    glimepiride (AMARYL) 4 MG tablet Take 1 tablet (4 mg total) by mouth before breakfast.    insulin aspart U-100 (NOVOLOG FLEXPEN U-100 INSULIN) 100 unit/mL (3 mL) InPn pen Inject as needed before meals: 180-230=+1, 231-280=+2, 281-330=+3, 331-380=+4, over 380=+5 units    latanoprost, PF, 0.005 % Drop 1 drop into affected eye in the evening Ophthalmic Once a day    metFORMIN (GLUCOPHAGE-XR) 500 MG ER 24hr tablet Take 1 tablet (500 mg total) by mouth 2 (two) times daily with meals.    metoprolol succinate (TOPROL-XL) 50 MG 24 hr tablet Take 1 tablet (50 mg total) by mouth once daily.    mirabegron (MYRBETRIQ) 25 mg Tb24 ER tablet Take 1 tablet (25 mg total) by mouth once daily.    mycophenolate (CELLCEPT) 250 mg Cap Take 4 capsules (1,000 mg total) by mouth 2 (two) times daily.    nintedanib (OFEV) 150 mg Cap Take 1 capsule (150 mg total) by mouth 2 (two) times a day.    nitroGLYCERIN (NITROSTAT) 0.3 MG SL tablet Place 1 tablet (0.3 mg total) under the tongue every 5 (five) minutes as needed for Chest pain.    omeprazole (PRILOSEC) 40 MG capsule Take 40 mg by mouth once daily.     "ONETOUCH DELICA PLUS LANCET 33 gauge Misc Apply topically 3 (three) times daily.    pen needle, diabetic 32 gauge x 5/32" Ndle 1 each by Misc.(Non-Drug; Combo Route) route once daily.    predniSONE (DELTASONE) 20 MG tablet Take 2 tablets (40 mg total) by mouth once daily for 30 days, THEN 1 tablet (20 mg total) once daily for 30 days, THEN 0.5 tablets (10 mg total) once daily.    rosuvastatin (CRESTOR) 20 MG tablet Take 1 tablet (20 mg total) by mouth once daily.    sacubitriL-valsartan (ENTRESTO) 24-26 mg per tablet Take 1 tablet by mouth 2 (two) times daily.    sucralfate (CARAFATE) 1 gram tablet Take 1 tablet (1 g total) by mouth 4 (four) times daily before meals and nightly.    sulfamethoxazole-trimethoprim 800-160mg (BACTRIM DS) 800-160 mg Tab Take 1 tablet by mouth once daily.     Family History       Problem Relation (Age of Onset)    Cancer Mother    Colon cancer Sister          Tobacco Use    Smoking status: Never     Passive exposure: Never    Smokeless tobacco: Never   Substance and Sexual Activity    Alcohol use: No    Drug use: Never    Sexual activity: Not Currently     Review of Systems   Constitutional:  Positive for activity change and fatigue. Negative for appetite change, chills, diaphoresis and fever.   HENT:  Negative for congestion, hearing loss and trouble swallowing.    Respiratory:  Positive for shortness of breath. Negative for cough, chest tightness and wheezing.    Cardiovascular:  Positive for chest pain. Negative for palpitations and leg swelling.   Gastrointestinal:  Negative for abdominal distention, abdominal pain, constipation, diarrhea, nausea and vomiting.   Endocrine: Negative.    Genitourinary:  Negative for difficulty urinating and dysuria.   Skin: Negative.    Neurological:  Negative for syncope, facial asymmetry and speech difficulty.   Psychiatric/Behavioral:  Negative for confusion and hallucinations. The patient is not nervous/anxious.      Objective:     Vital Signs (Most " Recent):  Temp: 98.1 °F (36.7 °C) (03/16/25 1058)  Pulse: 91 (03/16/25 1530)  Resp: (!) 22 (03/16/25 1530)  BP: (!) 148/97 (03/16/25 1530)  SpO2: 99 % (03/16/25 1530) Vital Signs (24h Range):  Temp:  [98.1 °F (36.7 °C)] 98.1 °F (36.7 °C)  Pulse:  [] 91  Resp:  [20-26] 22  SpO2:  [95 %-99 %] 99 %  BP: (103-148)/(76-97) 148/97        Body mass index is 19.34 kg/m².     Physical Exam  Vitals and nursing note reviewed.   Constitutional:       General: He is awake. He is not in acute distress.     Appearance: Normal appearance. He is well-developed and well-groomed. He is not ill-appearing, toxic-appearing or diaphoretic.      Interventions: Nasal cannula in place.   HENT:      Head: Normocephalic and atraumatic.   Cardiovascular:      Rate and Rhythm: Normal rate.   Pulmonary:      Effort: Pulmonary effort is normal. No tachypnea, accessory muscle usage or respiratory distress.   Neurological:      Mental Status: He is alert and oriented to person, place, and time. Mental status is at baseline.   Psychiatric:         Attention and Perception: Attention normal. He is attentive.         Mood and Affect: Mood normal. Mood is not anxious.         Speech: Speech normal.         Behavior: Behavior normal. Behavior is cooperative.         Thought Content: Thought content normal.         Cognition and Memory: Cognition and memory normal. Cognition is not impaired. Memory is not impaired. He does not exhibit impaired recent memory.         Judgment: Judgment normal.                Significant Labs: All pertinent labs within the past 24 hours have been reviewed.  CBC:   Recent Labs   Lab 03/16/25  1131   WBC 6.90   HGB 13.4*   HCT 39.4*        CMP:   Recent Labs   Lab 03/16/25  1131      K 4.3      CO2 23   *   BUN 13   CREATININE 1.2   CALCIUM 8.8   PROT 7.1   ALBUMIN 3.1*   BILITOT 0.5   ALKPHOS 143   *   *   ANIONGAP 10     Cardiac Markers:   Recent Labs   Lab 03/16/25  1131   BNP  "105*     Coagulation: No results for input(s): "PT", "INR", "APTT" in the last 48 hours.    Significant Imaging: I have reviewed all pertinent imaging results/findings within the past 24 hours.  Assessment/Plan:     * Chest pain  Recent Labs   Lab 03/16/25  1415   TROPONINI 0.012      EKG: nonspecific ST and T waves changes, sinus tachycardia.   Admit to obs  Patient with known CAD   Trend troponin   EKG PRN  Nitro PRN  Consult cardiology  Will continue ASA and Statin  Monitor for S/Sx of angina/ACS.   Maintain K > 4, Mag > 2 and Ca WNL to decrease arrhythmogenic potential  Continue to monitor on telemetry.     Transaminitis  Lab Results   Component Value Date     (H) 03/16/2025     (H) 03/16/2025    ALKPHOS 143 03/16/2025    BILITOT 0.5 03/16/2025     Lab Results   Component Value Date    INR 1.2 11/05/2024    INR 1.2 07/07/2023    INR 1.1 08/17/2019     Lab Results   Component Value Date    ALBUMIN 3.1 (L) 03/16/2025     Drug toxicology  Acetaminophen level  Acute hepatitis panel  RUQ sonogram     Chronic hypoxemic respiratory failure  Patient with Hypoxic Respiratory failure which is Chronic.  he is on home oxygen at 2 LPM. Supplemental oxygen was provided and noted-     Stable     Immunosuppression due to drug therapy  Resume home meds      Chronic HFrEF (heart failure with reduced ejection fraction)  Results for orders placed during the hospital encounter of 10/17/24    Echo    Interpretation Summary    Left Ventricle: Regional wall motion abnormalities present. There is normal systolic function with a visually estimated ejection fraction of 55 %. Grade I diastolic dysfunction. federico septal hypokinesis    Right Ventricle: Normal right ventricular cavity size. Systolic function is normal.    Left Atrium: Left atrium is mildly dilated.    IVC/SVC: Normal venous pressure at 3 mmHg.    Recent Labs   Lab 03/16/25  1131   *     Resume home meds listed below.   Cont to monitor I/O's and daily " weights.  Fluid restriction (2 liters/24 hours)  Low Na diet  Monitor for signs of fluid overload: RR>30, O2 sat<92%, weight gain of >3 lbs, or urinary output <160ml/8hr  Maintain oxygen sats >92% via NC if supplemental oxygen needed.     No data found.  Diuretics (From admission, onward)      Start     Stop Route Frequency Ordered    03/17/25 0900  furosemide tablet 20 mg         -- Oral Daily 03/16/25 1546          Cardiac/Autonomic (From admission, onward)      Start     Stop Route Frequency Ordered    03/17/25 0900  amLODIPine tablet 10 mg         -- Oral Daily 03/16/25 1546    03/17/25 0900  metoprolol succinate (TOPROL-XL) 24 hr tablet 50 mg         -- Oral Daily 03/16/25 1546    03/16/25 2100  sacubitriL-valsartan 24-26 mg per tablet 1 tablet         -- Oral 2 times daily 03/16/25 1546    03/16/25 1627  nitroGLYCERIN SL tablet 0.3 mg         -- SL Every 5 min PRN 03/16/25 1546    03/16/25 1600  atorvastatin tablet 80 mg         -- Oral Daily 03/16/25 1546                Hyperlipidemia  Lab Results   Component Value Date    LDLCALC 55.4 (L) 10/18/2024     Patient is chronically on statin  Continue home medication  Monitor for acute changes    atorvastatin - 40 MG  rosuvastatin - 20 MG      Essential hypertension  BP Readings from Last 3 Encounters:   03/16/25 (!) 148/97   02/27/25 122/70   02/26/25 128/68     Continuing home medications as prescribed  Will cont to monitor and adjust as needed  Will utilize p.r.n. blood pressure medication only if patient's blood pressure greater than 180/110 and he develops symptoms such as worsening chest pain or shortness of breath.  Cardiac diet    Current Antihypertensives:  nitroGLYCERIN SL tablet 0.3 mg, Every 5 min PRN, Sublingual  amLODIPine tablet 10 mg, Daily, Oral  furosemide tablet 20 mg, Daily, Oral  metoprolol succinate (TOPROL-XL) 24 hr tablet 50 mg, Daily, Oral      GERD (gastroesophageal reflux disease)  PPI      Interstitial lung disease exacerbation  Resume  "home cellcept.   Optimize lung function with bronchodilators including B2 adrenergic agonist and anticholinergics.   Supplemental oxygen via NC in order to assure adequate oxygenation.               Type 2 diabetes mellitus without complication, without long-term current use of insulin  Patient's FSGs are controlled on current medication regimen.  Last A1c reviewed-   Lab Results   Component Value Date    HGBA1C 7.8 (H) 01/02/2025     Most recent fingerstick glucose reviewed- No results for input(s): "POCTGLUCOSE" in the last 24 hours.  Current correctional scale  Low  Maintain anti-hyperglycemic dose as follows-   Antihyperglycemics (From admission, onward)      Start     Stop Route Frequency Ordered    03/16/25 1641  insulin aspart U-100 pen 0-10 Units         -- SubQ Before meals & nightly PRN 03/16/25 1546          Hold Oral hypoglycemics while patient is in the hospital.      VTE Risk Mitigation (From admission, onward)           Ordered     enoxaparin injection 40 mg  Daily         03/16/25 1546                         The attending portion of this evaluation, treatment, and documentation was performed per Roel Lehman MD via Telemedicine AudioVisual using the secure [employer] software platform with 2 way audio/video. The provider was located off-site and the patient is located in the hospital. The aforementioned video software was utilized to document the relevant history and physical exam    On 03/16/2025, patient should be placed in hospital observation services under my care.        Roel Lehman MD  Department of Hospital Medicine   [facility] - Emergency Dept  "

## 2025-03-16 NOTE — HPI
History of Present Illness:  Patient is a 74 y.o. male who has a past medical history of interstitial lung disease with chronic hypoxic respiratory failure on 2 L O2 via NC at home,  BPH (benign prostatic hyperplasia), CAD (coronary artery disease), Chronic HFrEF (heart failure with reduced ejection fraction), Diabetes mellitus, Hypertension, Kidney stone, Stroke insulin presented with chest pain. Onset was 1 day ago, with worsening course since that time. The patient describes the pain as intermittent, pressure like in nature, does not radiate. Patient rates pain as a 5/10 in intensity.  Associated symptoms are dyspnea and fatigue. Aggravating factors are exercise.  Alleviating factors are is rest. He currently does not have any chest pain. Patient currently denies any fever/chills, weakness, numbness, abdominal pain, nausea/vomiting, dysuria/hematuria, or weight loss.

## 2025-03-16 NOTE — ASSESSMENT & PLAN NOTE
Patient with Hypoxic Respiratory failure which is Chronic.  he is on home oxygen at 2 LPM. Supplemental oxygen was provided and noted-     Stable

## 2025-03-16 NOTE — ASSESSMENT & PLAN NOTE
Recent Labs   Lab 03/16/25  1415   TROPONINI 0.012      EKG: nonspecific ST and T waves changes, sinus tachycardia.   Admit to obs  Patient with known CAD   Trend troponin   EKG PRN  Nitro PRN  Consult cardiology  Will continue ASA and Statin  Monitor for S/Sx of angina/ACS.   Maintain K > 4, Mag > 2 and Ca WNL to decrease arrhythmogenic potential  Continue to monitor on telemetry.

## 2025-03-16 NOTE — ASSESSMENT & PLAN NOTE
Lab Results   Component Value Date     (H) 03/16/2025     (H) 03/16/2025    ALKPHOS 143 03/16/2025    BILITOT 0.5 03/16/2025     Lab Results   Component Value Date    INR 1.2 11/05/2024    INR 1.2 07/07/2023    INR 1.1 08/17/2019     Lab Results   Component Value Date    ALBUMIN 3.1 (L) 03/16/2025     Drug toxicology  Acetaminophen level  Acute hepatitis panel  RUQ sonogram

## 2025-03-16 NOTE — ASSESSMENT & PLAN NOTE
BP Readings from Last 3 Encounters:   03/16/25 (!) 148/97   02/27/25 122/70   02/26/25 128/68     Continuing home medications as prescribed  Will cont to monitor and adjust as needed  Will utilize p.r.n. blood pressure medication only if patient's blood pressure greater than 180/110 and he develops symptoms such as worsening chest pain or shortness of breath.  Cardiac diet    Current Antihypertensives:  nitroGLYCERIN SL tablet 0.3 mg, Every 5 min PRN, Sublingual  amLODIPine tablet 10 mg, Daily, Oral  furosemide tablet 20 mg, Daily, Oral  metoprolol succinate (TOPROL-XL) 24 hr tablet 50 mg, Daily, Oral

## 2025-03-16 NOTE — ASSESSMENT & PLAN NOTE
Lab Results   Component Value Date    LDLCALC 55.4 (L) 10/18/2024     Patient is chronically on statin  Continue home medication  Monitor for acute changes    atorvastatin - 40 MG  rosuvastatin - 20 MG

## 2025-03-16 NOTE — ASSESSMENT & PLAN NOTE
Results for orders placed during the hospital encounter of 10/17/24    Echo    Interpretation Summary    Left Ventricle: Regional wall motion abnormalities present. There is normal systolic function with a visually estimated ejection fraction of 55 %. Grade I diastolic dysfunction. federico septal hypokinesis    Right Ventricle: Normal right ventricular cavity size. Systolic function is normal.    Left Atrium: Left atrium is mildly dilated.    IVC/SVC: Normal venous pressure at 3 mmHg.    Recent Labs   Lab 03/16/25  1131   *     Resume home meds listed below.   Cont to monitor I/O's and daily weights.  Fluid restriction (2 liters/24 hours)  Low Na diet  Monitor for signs of fluid overload: RR>30, O2 sat<92%, weight gain of >3 lbs, or urinary output <160ml/8hr  Maintain oxygen sats >92% via NC if supplemental oxygen needed.     No data found.  Diuretics (From admission, onward)      Start     Stop Route Frequency Ordered    03/17/25 0900  furosemide tablet 20 mg         -- Oral Daily 03/16/25 1546          Cardiac/Autonomic (From admission, onward)      Start     Stop Route Frequency Ordered    03/17/25 0900  amLODIPine tablet 10 mg         -- Oral Daily 03/16/25 1546    03/17/25 0900  metoprolol succinate (TOPROL-XL) 24 hr tablet 50 mg         -- Oral Daily 03/16/25 1546    03/16/25 2100  sacubitriL-valsartan 24-26 mg per tablet 1 tablet         -- Oral 2 times daily 03/16/25 1546    03/16/25 1627  nitroGLYCERIN SL tablet 0.3 mg         -- SL Every 5 min PRN 03/16/25 1546    03/16/25 1600  atorvastatin tablet 80 mg         -- Oral Daily 03/16/25 1546

## 2025-03-16 NOTE — ASSESSMENT & PLAN NOTE
Resume home cellcept.   Optimize lung function with bronchodilators including B2 adrenergic agonist and anticholinergics.   Supplemental oxygen via NC in order to assure adequate oxygenation.

## 2025-03-16 NOTE — ASSESSMENT & PLAN NOTE
"Patient's FSGs are controlled on current medication regimen.  Last A1c reviewed-   Lab Results   Component Value Date    HGBA1C 7.8 (H) 01/02/2025     Most recent fingerstick glucose reviewed- No results for input(s): "POCTGLUCOSE" in the last 24 hours.  Current correctional scale  Low  Maintain anti-hyperglycemic dose as follows-   Antihyperglycemics (From admission, onward)      Start     Stop Route Frequency Ordered    03/16/25 1641  insulin aspart U-100 pen 0-10 Units         -- SubQ Before meals & nightly PRN 03/16/25 1546          Hold Oral hypoglycemics while patient is in the hospital.  "

## 2025-03-16 NOTE — ED PROVIDER NOTES
"Encounter Date: 3/16/2025       History     Chief Complaint   Patient presents with    Chest Pain     Pt to ED c/o chest heaviness after doing his bicycle exercise last night. Pt is on home O2 @ 4 L's.      Patient is a 74-year-old with a history of ILD on home O2, BPH, CAD, Chronic HFrEF, Hypertension, Kidney stone, Stroke, and Type 2 diabetes mellitus who presents with chest heaviness.  Patient reports he was in his usual state health recently.  Last night he got on his exercise bike and began to have diffuse chest pain and heaviness.  He denies associated diaphoresis, shortness of breath, nausea or vomiting.  He got off of his bike and laid down in his recliner and the symptoms subsided and about 15 minutes.  He woke up this morning feeling well but wanted to see if the pain would come back so he decided to go on a walk.  During the walk, his symptoms returned and he was unable to ambulate as far as he usually does.  His neighbor found him and brought him home.  He then called EMS.  Patient reports the pain has subsided but he still is feeling some minor chest heaviness.  He reports his shortness of breath and cough are at their chronic baseline.  He denies fevers.  He does not usually have chest pain.    The history is provided by the patient. No  was used.     Review of patient's allergies indicates:   Allergen Reactions    Dapagliflozin      Other reaction(s): Other (See Comments)    Pcn [penicillins]     Linagliptin Other (See Comments)     "it knocked me down", "it almost killed me"    Lisinopril Other (See Comments)     cough    Pantoprazole Hives     Past Medical History:   Diagnosis Date    Acute hypoxic respiratory failure 09/23/2024    Oxygen therapy 24/7 now, continue 2 L oxygen therapy.      BPH (benign prostatic hyperplasia) 02/28/2024    CAD (coronary artery disease)     Sees [facility], h/o stent    Chronic HFrEF (heart failure with reduced ejection fraction) 05/01/2024    " Diabetes mellitus     Hypertension     Kidney stone     Stroke     age 62    Type 2 diabetes mellitus without complication, without long-term current use of insulin 10/08/2021     Past Surgical History:   Procedure Laterality Date    24 HOUR IMPEDANCE PH MONITORING OF ESOPHAGUS IN PATIENT NOT TAKING ACID REDUCING MEDICATIONS N/A 11/13/2024    Procedure: IMPEDANCE PH STUDY, ESOPHAGEAL, 24 HOUR, IN PATIENT NOT TAKING ACID REDUCING MEDICATION;  Surgeon: Stephany Mendoza MD;  Location: [facility] (4TH FLR);  Service: Endoscopy;  Laterality: N/A;  referral dr miguel/ off ppi / prep inst sent to pt via mail  11/6- precall attempted no answer. Unable to [facility]-  11/8 - pt not called, per chart review patient currently inpatient at [place] [address]    CARDIAC CATHETERIZATION      with stent    COLONOSCOPY N/A 03/27/2024    Procedure: COLONOSCOPY;  Surgeon: Ariela Castro MD;  Location: [facility] ENDO;  Service: Endoscopy;  Laterality: N/A;    ESOPHAGEAL MANOMETRY WITH MEASUREMENT OF IMPEDANCE N/A 11/13/2024    Procedure: MANOMETRY, ESOPHAGUS, WITH IMPEDANCE MEASUREMENT;  Surgeon: Stephany Mendoza MD;  Location: [facility] (4TH FLR);  Service: Endoscopy;  Laterality: N/A;    ESOPHAGOGASTRODUODENOSCOPY N/A 03/27/2024    Procedure: EGD (ESOPHAGOGASTRODUODENOSCOPY);  Surgeon: Ariela Castro MD;  Location: [facility] ENDO;  Service: Endoscopy;  Laterality: N/A;    LEFT HEART CATHETERIZATION Left 9/10/2024    Procedure: Left heart cath;  Surgeon: Jemal Drummond MD;  Location: [facility] CATH LAB;  Service: Cardiology;  Laterality: Left;    SHOULDER SURGERY Right      Family History   Problem Relation Name Age of Onset    Cancer Mother      Colon cancer Sister      Esophageal cancer Neg Hx       Social History[1]  Review of Systems   Constitutional:  Negative for fever.   HENT:  Negative for sore throat.    Respiratory:  Positive for shortness of breath.    Cardiovascular:  Positive for chest pain.    Gastrointestinal:  Negative for nausea.   Genitourinary:  Negative for dysuria.   Musculoskeletal:  Negative for back pain.   Skin:  Negative for rash.   Neurological:  Negative for weakness.       Physical Exam     Initial Vitals [03/16/25 1058]   BP Pulse Resp Temp SpO2   103/76 (!) 115 20 98.1 °F (36.7 °C) 95 %      MAP       --         Physical Exam    Constitutional: Vital signs are normal. No distress.   HENT:   Head: Normocephalic and atraumatic.   Eyes: EOM are normal.   Neck:   Normal range of motion.  Cardiovascular:  Normal rate, regular rhythm and intact distal pulses.           Pulmonary/Chest: No respiratory distress. He exhibits tenderness (Sternal).   On home O2, bilateral coarse breath sounds   Abdominal: Abdomen is soft. There is abdominal tenderness (Epigastric).   Musculoskeletal:         General: No edema. Normal range of motion.      Cervical back: Normal range of motion.     Neurological: He is alert and oriented to person, place, and time. GCS score is 15. GCS eye subscore is 4. GCS verbal subscore is 5. GCS motor subscore is 6.   Skin: Skin is warm and dry.         ED Course   Procedures  Labs Reviewed   CBC W/ AUTO DIFFERENTIAL - Abnormal       Result Value    WBC 6.90      RBC 4.83      Hemoglobin 13.4 (*)     Hematocrit 39.4 (*)     MCV 82      MCH 27.7      MCHC 34.0      RDW 13.6      Platelets 186      MPV 9.7      Immature Granulocytes 0.3      Gran # (ANC) 4.7      Immature Grans (Abs) 0.02      Lymph # 1.3      Mono # 0.5      Eos # 0.4      Baso # 0.02      nRBC 0      Gran % 67.8      Lymph % 19.0      Mono % 6.5      Eosinophil % 6.1      Basophil % 0.3      Differential Method Automated     COMPREHENSIVE METABOLIC PANEL - Abnormal    Sodium 137      Potassium 4.3      Chloride 104      CO2 23      Glucose 192 (*)     BUN 13      Creatinine 1.2      Calcium 8.8      Total Protein 7.1      Albumin 3.1 (*)     Total Bilirubin 0.5      Alkaline Phosphatase 143       (*)      " (*)     eGFR >60      Anion Gap 10     B-TYPE NATRIURETIC PEPTIDE - Abnormal     (*)    D DIMER, QUANTITATIVE - Abnormal    D-Dimer 0.57 (*)    TROPONIN I    Troponin I 0.011     TROPONIN I    Troponin I 0.012            Imaging Results              X-Ray Chest AP Portable (Final result)  Result time 03/16/25 12:21:20      Final result by Adis Gomez MD (03/16/25 12:21:20)                   Impression:      As above      Electronically signed by: Adis Gomez MD  Date:    03/16/2025  Time:    12:21               Narrative:    EXAMINATION:  XR CHEST AP PORTABLE    CLINICAL HISTORY:  Chest Pain;    TECHNIQUE:  Single frontal view of the chest was performed.    COMPARISON:  Prior radiographs, most recent 02/17/2025    FINDINGS:  Cardiomediastinal structures unchanged.  Lungs demonstrate low lung volumes chronic interstitial opacities.  Overall appearance similar to prior exams.                                       Medications   aspirin chewable tablet 243 mg (243 mg Oral Given 3/16/25 1210)   benzonatate capsule 100 mg (100 mg Oral Given 3/16/25 1410)     Medical Decision Making  Emmanuel Grant is a 74 y.o. male with h/o ILD on home O2, BPH, CAD, Chronic HFrEF, Hypertension, Kidney stone, Stroke, and Type 2 diabetes mellitus who presents with chest heaviness.    Initial vitals notable for tachycardia. Physical exam reveals a pleasant, chronically ill appearing gentleman with mild epigastric/sternal chest wall tenderness to palpation    Concern at this time for ACS possible given history of CAD, HTN, dm, HEART score 5 prior to initial troponin results.  Patient was last catheterization in September which showed nonobstructive coronary artery disease.  PE less likely given no hypoxia, sats > 95%,  no tachypnea, however still considering given tachycardia.  Less likely dissection given  no "tearing" sensation or radiation to back, stable vitals, no focal neuro deficits, 2+ pulses in all 4 " extremities. PNA less likely given lack of fever, cough, focal findings on lung ausculation. Equal bilateral breath sounds do not suggest PTX.  Pain is not reproducible on exam to suggest costochondritis or alternative MSK etiology    I will obtain the following to better assess: CBC , CMP, Trop, BNP, EKG, CXR, D-dimer. Immediate interventions include  Aspirin.       DISCLAIMER: This note was prepared with [employer] voice recognition transcription software. Garbled syntax, mangled pronouns, and other bizarre constructions may be attributed to that software system.      Amount and/or Complexity of Data Reviewed  Labs: ordered.  Radiology: ordered.    Risk  OTC drugs.  Prescription drug management.               ED Course as of 03/16/25 1748   Sun Mar 16, 2025   1205 CMP notable for mild AST and ALT elevations [KI]   1228 Initial troponin within normal limits [KI]   1230 Patient's D-dimer is 0.57, his age adjusted D-dimer cutoff is 0.72, no need for CT PE at this time [KI]   1507 Patient workup overall reassuring.  Given his known history of CAD, his heart score is 5.  We will discuss admission with Hospital Medicine for further evaluation and management. [KI]   1521 Patient has been accepted to Hospital Medicine [KI]      ED Course User Index  [KI] Flaca Garcia MD                           Clinical Impression:  Final diagnoses:  [R07.9] Chest pain                     [1]  Social History  Tobacco Use    Smoking status: Never     Passive exposure: Never    Smokeless tobacco: Never   Substance Use Topics    Alcohol use: No    Drug use: Never      Flaca Garcia MD  03/16/25 8607

## 2025-03-17 VITALS
TEMPERATURE: 98 F | OXYGEN SATURATION: 95 % | RESPIRATION RATE: 18 BRPM | HEIGHT: 68 IN | SYSTOLIC BLOOD PRESSURE: 119 MMHG | WEIGHT: 128.75 LBS | HEART RATE: 81 BPM | BODY MASS INDEX: 19.51 KG/M2 | DIASTOLIC BLOOD PRESSURE: 82 MMHG

## 2025-03-17 LAB
ALBUMIN SERPL BCP-MCNC: 2.9 G/DL (ref 3.5–5.2)
ALP SERPL-CCNC: 119 U/L (ref 40–150)
ALT SERPL W/O P-5'-P-CCNC: 181 U/L (ref 10–44)
AMPHET+METHAMPHET UR QL: NEGATIVE
ANION GAP SERPL CALC-SCNC: 8 MMOL/L (ref 8–16)
AST SERPL-CCNC: 80 U/L (ref 10–40)
AV INDEX (PROSTH): 0.63
AV MEAN GRADIENT: 2 MMHG
AV PEAK GRADIENT: 3 MMHG
AV VALVE AREA BY VELOCITY RATIO: 2.2 CM²
AV VALVE AREA: 2.2 CM²
AV VELOCITY RATIO: 0.63
BARBITURATES UR QL SCN>200 NG/ML: NEGATIVE
BASOPHILS # BLD AUTO: 0.03 K/UL (ref 0–0.2)
BASOPHILS NFR BLD: 0.4 % (ref 0–1.9)
BENZODIAZ UR QL SCN>200 NG/ML: NEGATIVE
BILIRUB SERPL-MCNC: 0.5 MG/DL (ref 0.1–1)
BSA FOR ECHO PROCEDURE: 1.67 M2
BUN SERPL-MCNC: 7 MG/DL (ref 8–23)
BZE UR QL SCN: NEGATIVE
CALCIUM SERPL-MCNC: 8.5 MG/DL (ref 8.7–10.5)
CANNABINOIDS UR QL SCN: ABNORMAL
CHLORIDE SERPL-SCNC: 104 MMOL/L (ref 95–110)
CO2 SERPL-SCNC: 24 MMOL/L (ref 23–29)
CREAT SERPL-MCNC: 0.8 MG/DL (ref 0.5–1.4)
CREAT UR-MCNC: 107 MG/DL (ref 23–375)
CV ECHO LV RWT: 0.54 CM
CV STRESS BASE HR: 100 BPM
DIASTOLIC BLOOD PRESSURE: 96 MMHG
DIFFERENTIAL METHOD BLD: ABNORMAL
DOP CALC AO PEAK VEL: 0.8 M/S
DOP CALC AO VTI: 12.4 CM
DOP CALC LVOT AREA: 3.5 CM2
DOP CALC LVOT DIAMETER: 2.1 CM
DOP CALC LVOT PEAK VEL: 0.5 M/S
DOP CALC LVOT STROKE VOLUME: 27 CM3
DOP CALC MV VTI: 11.7 CM
DOP CALCLVOT PEAK VEL VTI: 7.8 CM
ECHO LV POSTERIOR WALL: 1 CM (ref 0.6–1.1)
EOSINOPHIL # BLD AUTO: 0.6 K/UL (ref 0–0.5)
EOSINOPHIL NFR BLD: 7.9 % (ref 0–8)
ERYTHROCYTE [DISTWIDTH] IN BLOOD BY AUTOMATED COUNT: 13.4 % (ref 11.5–14.5)
EST. GFR  (NO RACE VARIABLE): >60 ML/MIN/1.73 M^2
ETHANOL UR-MCNC: <10 MG/DL
FRACTIONAL SHORTENING: 18.9 % (ref 28–44)
GLUCOSE SERPL-MCNC: 122 MG/DL (ref 70–110)
HAV IGM SERPL QL IA: NORMAL
HBV CORE IGM SERPL QL IA: NORMAL
HBV SURFACE AG SERPL QL IA: NORMAL
HCT VFR BLD AUTO: 39.2 % (ref 40–54)
HCV AB SERPL QL IA: NORMAL
HGB BLD-MCNC: 13 G/DL (ref 14–18)
IMM GRANULOCYTES # BLD AUTO: 0.03 K/UL (ref 0–0.04)
IMM GRANULOCYTES NFR BLD AUTO: 0.4 % (ref 0–0.5)
INTERVENTRICULAR SEPTUM: 1 CM (ref 0.6–1.1)
IVC DIAMETER: 1.79 CM
LA MAJOR: 5.2 CM
LA MINOR: 4.8 CM
LA WIDTH: 3.7 CM
LEFT ATRIUM SIZE: 3.4 CM
LEFT ATRIUM VOLUME INDEX: 32 ML/M2
LEFT ATRIUM VOLUME: 53 CM3
LEFT INTERNAL DIMENSION IN SYSTOLE: 3 CM (ref 2.1–4)
LEFT VENTRICLE DIASTOLIC VOLUME INDEX: 34.91 ML/M2
LEFT VENTRICLE DIASTOLIC VOLUME: 59 ML
LEFT VENTRICLE MASS INDEX: 66.6 G/M2
LEFT VENTRICLE SYSTOLIC VOLUME INDEX: 20.7 ML/M2
LEFT VENTRICLE SYSTOLIC VOLUME: 35 ML
LEFT VENTRICULAR INTERNAL DIMENSION IN DIASTOLE: 3.7 CM (ref 3.5–6)
LEFT VENTRICULAR MASS: 112.5 G
LVED V (TEICH): 58.66 ML
LVES V (TEICH): 35.46 ML
LVOT MG: 0.71 MMHG
LVOT MV: 0.4 CM/S
LYMPHOCYTES # BLD AUTO: 1.5 K/UL (ref 1–4.8)
LYMPHOCYTES NFR BLD: 19.8 % (ref 18–48)
MAGNESIUM SERPL-MCNC: 1.7 MG/DL (ref 1.6–2.6)
MCH RBC QN AUTO: 26.7 PG (ref 27–31)
MCHC RBC AUTO-ENTMCNC: 33.2 G/DL (ref 32–36)
MCV RBC AUTO: 81 FL (ref 82–98)
METHADONE UR QL SCN>300 NG/ML: NEGATIVE
MONOCYTES # BLD AUTO: 0.5 K/UL (ref 0.3–1)
MONOCYTES NFR BLD: 6.7 % (ref 4–15)
MV MEAN GRADIENT: 2 MMHG
MV PEAK GRADIENT: 4 MMHG
MV VALVE AREA BY CONTINUITY EQUATION: 2.31 CM2
NEUTROPHILS # BLD AUTO: 4.8 K/UL (ref 1.8–7.7)
NEUTROPHILS NFR BLD: 64.8 % (ref 38–73)
NRBC BLD-RTO: 0 /100 WBC
OHS CV CPX 85 PERCENT MAX PREDICTED HEART RATE MALE: 126
OHS CV CPX MAX PREDICTED HEART RATE: 148
OHS CV CPX PATIENT IS FEMALE: 0
OHS CV CPX PATIENT IS MALE: 1
OHS CV CPX PEAK DIASTOLIC BLOOD PRESSURE: 83 MMHG
OHS CV CPX PEAK HEAR RATE: 121 BPM
OHS CV CPX PEAK RATE PRESSURE PRODUCT: NORMAL
OHS CV CPX PEAK SYSTOLIC BLOOD PRESSURE: 107 MMHG
OHS CV CPX PERCENT MAX PREDICTED HEART RATE ACHIEVED: 82
OHS CV CPX RATE PRESSURE PRODUCT PRESENTING: NORMAL
OHS CV INITIAL DOSE: 10.1 MCG/KG/MIN
OHS CV PEAK DOSE: 30.2 MCG/KG/MIN
OHS CV RV/LV RATIO: 0.7 CM
OPIATES UR QL SCN: NEGATIVE
PCP UR QL SCN>25 NG/ML: NEGATIVE
PHOSPHATE SERPL-MCNC: 2.4 MG/DL (ref 2.7–4.5)
PISA TR MAX VEL: 2.6 M/S
PLATELET # BLD AUTO: 179 K/UL (ref 150–450)
PMV BLD AUTO: 9.8 FL (ref 9.2–12.9)
POCT GLUCOSE: 126 MG/DL (ref 70–110)
POCT GLUCOSE: 246 MG/DL (ref 70–110)
POCT GLUCOSE: 316 MG/DL (ref 70–110)
POTASSIUM SERPL-SCNC: 3.7 MMOL/L (ref 3.5–5.1)
PROT SERPL-MCNC: 6.7 G/DL (ref 6–8.4)
PV PEAK GRADIENT: 1 MMHG
PV PEAK VELOCITY: 0.52 M/S
RA MAJOR: 4.68 CM
RA PRESSURE ESTIMATED: 3 MMHG
RA WIDTH: 2.6 CM
RBC # BLD AUTO: 4.86 M/UL (ref 4.6–6.2)
RIGHT VENTRICLE DIASTOLIC BASEL DIMENSION: 2.6 CM
RIGHT VENTRICULAR END-DIASTOLIC DIMENSION: 2.55 CM
RV TB RVSP: 6 MMHG
SINUS: 3.73 CM
SODIUM SERPL-SCNC: 136 MMOL/L (ref 136–145)
STJ: 3.2 CM
SYSTOLIC BLOOD PRESSURE: 121 MMHG
TOXICOLOGY INFORMATION: ABNORMAL
TR MAX PG: 26 MMHG
TRICUSPID ANNULAR PLANE SYSTOLIC EXCURSION: 1.41 CM
TV PEAK GRADIENT: 26 MMHG
TV REST PULMONARY ARTERY PRESSURE: 30 MMHG
WBC # BLD AUTO: 7.34 K/UL (ref 3.9–12.7)
Z-SCORE OF LEFT VENTRICULAR DIMENSION IN END DIASTOLE: -2.41
Z-SCORE OF LEFT VENTRICULAR DIMENSION IN END SYSTOLE: 0.23

## 2025-03-17 PROCEDURE — 84100 ASSAY OF PHOSPHORUS: CPT | Mod: HCNC,NTX | Performed by: HOSPITALIST

## 2025-03-17 PROCEDURE — 85025 COMPLETE CBC W/AUTO DIFF WBC: CPT | Mod: HCNC,NTX | Performed by: HOSPITALIST

## 2025-03-17 PROCEDURE — 25000003 PHARM REV CODE 250: Mod: HCNC,NTX | Performed by: HOSPITALIST

## 2025-03-17 PROCEDURE — 80053 COMPREHEN METABOLIC PANEL: CPT | Mod: HCNC,NTX | Performed by: HOSPITALIST

## 2025-03-17 PROCEDURE — 25000003 PHARM REV CODE 250: Mod: HCNC,NTX | Performed by: NURSE PRACTITIONER

## 2025-03-17 PROCEDURE — 83735 ASSAY OF MAGNESIUM: CPT | Mod: HCNC,NTX | Performed by: HOSPITALIST

## 2025-03-17 PROCEDURE — 94799 UNLISTED PULMONARY SVC/PX: CPT | Mod: HCNC,NTX

## 2025-03-17 PROCEDURE — 94761 N-INVAS EAR/PLS OXIMETRY MLT: CPT | Mod: HCNC,NTX

## 2025-03-17 PROCEDURE — 63600175 PHARM REV CODE 636 W HCPCS: Mod: HCNC,NTX | Performed by: HOSPITALIST

## 2025-03-17 PROCEDURE — 36415 COLL VENOUS BLD VENIPUNCTURE: CPT | Mod: HCNC,NTX | Performed by: HOSPITALIST

## 2025-03-17 PROCEDURE — 27000221 HC OXYGEN, UP TO 24 HOURS: Mod: HCNC,NTX

## 2025-03-17 PROCEDURE — G0378 HOSPITAL OBSERVATION PER HR: HCPCS | Mod: HCNC,NTX

## 2025-03-17 PROCEDURE — 99214 OFFICE O/P EST MOD 30 MIN: CPT | Mod: 25,HCNC,NTX, | Performed by: INTERNAL MEDICINE

## 2025-03-17 PROCEDURE — 96372 THER/PROPH/DIAG INJ SC/IM: CPT | Performed by: HOSPITALIST

## 2025-03-17 PROCEDURE — 63600175 PHARM REV CODE 636 W HCPCS: Mod: HCNC,NTX | Performed by: INTERNAL MEDICINE

## 2025-03-17 PROCEDURE — A9502 TC99M TETROFOSMIN: HCPCS | Mod: HCNC,NTX

## 2025-03-17 PROCEDURE — 99900035 HC TECH TIME PER 15 MIN (STAT): Mod: HCNC,NTX

## 2025-03-17 RX ORDER — REGADENOSON 0.08 MG/ML
0.4 INJECTION, SOLUTION INTRAVENOUS ONCE
Status: COMPLETED | OUTPATIENT
Start: 2025-03-17 | End: 2025-03-17

## 2025-03-17 RX ORDER — BENZONATATE 100 MG/1
200 CAPSULE ORAL 3 TIMES DAILY PRN
Status: DISCONTINUED | OUTPATIENT
Start: 2025-03-17 | End: 2025-03-17 | Stop reason: HOSPADM

## 2025-03-17 RX ORDER — SODIUM,POTASSIUM PHOSPHATES 280-250MG
2 POWDER IN PACKET (EA) ORAL ONCE
Status: COMPLETED | OUTPATIENT
Start: 2025-03-17 | End: 2025-03-17

## 2025-03-17 RX ADMIN — ATORVASTATIN CALCIUM 80 MG: 40 TABLET, FILM COATED ORAL at 08:03

## 2025-03-17 RX ADMIN — PANTOPRAZOLE SODIUM 40 MG: 40 TABLET, DELAYED RELEASE ORAL at 08:03

## 2025-03-17 RX ADMIN — BENZONATATE 200 MG: 100 CAPSULE ORAL at 02:03

## 2025-03-17 RX ADMIN — Medication 2 PACKET: at 02:03

## 2025-03-17 RX ADMIN — INSULIN ASPART 4 UNITS: 100 INJECTION, SOLUTION INTRAVENOUS; SUBCUTANEOUS at 02:03

## 2025-03-17 RX ADMIN — TETROFOSMIN 30.2 MILLICURIE: 1.38 INJECTION, POWDER, LYOPHILIZED, FOR SOLUTION INTRAVENOUS at 11:03

## 2025-03-17 RX ADMIN — METOPROLOL SUCCINATE 50 MG: 50 TABLET, EXTENDED RELEASE ORAL at 08:03

## 2025-03-17 RX ADMIN — SACUBITRIL AND VALSARTAN 1 TABLET: 24; 26 TABLET, FILM COATED ORAL at 08:03

## 2025-03-17 RX ADMIN — MYCOPHENOLATE MOFETIL 1000 MG: 250 CAPSULE ORAL at 08:03

## 2025-03-17 RX ADMIN — AMLODIPINE BESYLATE 10 MG: 5 TABLET ORAL at 08:03

## 2025-03-17 RX ADMIN — TETROFOSMIN 10.1 MILLICURIE: 1.38 INJECTION, POWDER, LYOPHILIZED, FOR SOLUTION INTRAVENOUS at 10:03

## 2025-03-17 RX ADMIN — ASPIRIN 81 MG CHEWABLE TABLET 81 MG: 81 TABLET CHEWABLE at 08:03

## 2025-03-17 RX ADMIN — REGADENOSON 0.4 MG: 0.08 INJECTION, SOLUTION INTRAVENOUS at 11:03

## 2025-03-17 RX ADMIN — FUROSEMIDE 20 MG: 20 TABLET ORAL at 08:03

## 2025-03-17 RX ADMIN — SENNOSIDES AND DOCUSATE SODIUM 1 TABLET: 50; 8.6 TABLET ORAL at 08:03

## 2025-03-17 NOTE — ASSESSMENT & PLAN NOTE
CP during exercise. No MI. Hx CAD with SAYDA stent and occluded D1 in stent halfway. Last [facility] 9/10/24 with patent stent and non-obstructive CAD - medical Rx. Echo and stress test today. Large fixed anteroapical defect seen on stress test 6/17/24. Ok for d/c if stress test free of significant ischemia

## 2025-03-17 NOTE — ASSESSMENT & PLAN NOTE
Controlled  Current Antihypertensives  amLODIPine tablet 10 mg, Daily, Oral  furosemide tablet 20 mg, Daily, Oral  metoprolol succinate (TOPROL-XL) 24 hr tablet 50 mg, Daily, Oral  nitroGLYCERIN SL tablet 0.4 mg, Use PRN, Sublingual       Duplicate request for Insulin Pen Needle 32G X 6 MM Misc.  Already filled on 10/30/2017.

## 2025-03-17 NOTE — PLAN OF CARE
Problem: Adult Inpatient Plan of Care  Goal: Plan of Care Review  Outcome: Progressing  Goal: Patient-Specific Goal (Individualized)  Outcome: Progressing  Goal: Absence of Hospital-Acquired Illness or Injury  Outcome: Progressing  Goal: Optimal Comfort and Wellbeing  Outcome: Progressing  Goal: Readiness for Transition of Care  Outcome: Progressing     Problem: Infection  Goal: Absence of Infection Signs and Symptoms  Outcome: Progressing     Problem: Diabetes Comorbidity  Goal: Blood Glucose Level Within Targeted Range  Outcome: Progressing     Problem: Diabetes Comorbidity  Goal: Blood Glucose Level Within Targeted Range  Outcome: Progressing     Problem: Infection  Goal: Absence of Infection Signs and Symptoms  Outcome: Progressing     Problem: Diabetes Comorbidity  Goal: Blood Glucose Level Within Targeted Range  Outcome: Progressing

## 2025-03-17 NOTE — CONSULTS
[place] - Telemetry  Cardiology  Consult Note    Patient Name: Emmanuel Grant  MRN: [id number]  Admission Date: 3/16/2025  Hospital Length of Stay: 0 days  Code Status: Full Code   Attending Provider: Blanquita Hunt MD   Consulting Provider: Jemal Drummond MD  Primary Care Physician: Wilfredo De Souza MD  Principal Problem:Chest pain    Patient information was obtained from patient and ER records.     Inpatient consult to Cardiology  Consult performed by: Jemal Drummond MD  Consult ordered by: Roel Pelaez MD        Subjective:     Chief Complaint:  CP, CAD         HPI: History of Present Illness:  Patient is a 74 y.o. male who has a past medical history of interstitial lung disease with chronic hypoxic respiratory failure on 2 L O2 via NC at home,  BPH (benign prostatic hyperplasia), CAD (coronary artery disease), Chronic HFrEF (heart failure with reduced ejection fraction), Diabetes mellitus, Hypertension, Kidney stone, Stroke insulin presented with chest pain. Onset was 1 day ago, with worsening course since that time. The patient describes the pain as intermittent, pressure like in nature, does not radiate. Patient rates pain as a 5/10 in intensity.  Associated symptoms are dyspnea and fatigue. Aggravating factors are exercise.  Alleviating factors are is rest. He currently does not have any chest pain. Patient currently denies any fever/chills, weakness, numbness, abdominal pain, nausea/vomiting, dysuria/hematuria, or weight loss.     CP yesterday while riding bike. Denies CP or SOB currently  EKG sinus tachycardia 109 PRWP  Troponin negative x 3      Saw me during admission 9/10/24    9/10/24 C - EDP 17, LAD stent patent - luminal irregularities otherwise, D1  - stent senior care - fills late - small vessel, Cx/RCA luminal irregularities     Echo 10/17/24    Left Ventricle: Regional wall motion abnormalities present. There is normal systolic function with a visually estimated ejection fraction of  55 %. Grade I diastolic dysfunction. federico septal hypokinesis    Right Ventricle: Normal right ventricular cavity size. Systolic function is normal.    Left Atrium: Left atrium is mildly dilated.    IVC/SVC: Normal venous pressure at 3 mmHg.      Followed by Dr Ventura  # CAD/MI/PCI, MPI 6/2024 with predom fixed LAD defect and mild LV dysfxn.  # ?HFmEF, some crackles and SOB on exam today (but also with ILD)  # ILD, follows with Dr. Schmitt  # DM  # HTN, uncontrolled  # HLP on atorva 40mg  # ao root dil, 3.8cm (echo 6/2024)       Cardiovascular Testing:  L MPI 6/17/24     Abnormal myocardial perfusion scan.    There is a severe intensity, medium sized, mostly fixed perfusion abnormality with minimal reversibilty in the mid to apical anterior and apical wall(s) in the typical distribution of the LAD territory.  This is conssistent with scar and minimal periinfarct ischemia.    There are no other significant perfusion abnormalities.    The gated perfusion images showed an ejection fraction of 40% post stress.    There is anteroapical wall akinesis during stress.     Echo 6/17/24 (images personally reviewed and interpreted)    Left Ventricle: The left ventricle is normal in size. Normal wall thickness. Mild global hypokinesis present, cannot exclude anterolateral WMA. There is mildly reduced systolic function with a visually estimated ejection fraction of 40 - 45%. Grade I diastolic dysfunction.    Right Ventricle: Normal right ventricular cavity size. Systolic function is normal.    Aorta: Aortic root is mildly dilated measuring 3.80 cm.       Past Medical History:   Diagnosis Date    Acute hypoxic respiratory failure 09/23/2024    Oxygen therapy 24/7 now, continue 2 L oxygen therapy.      BPH (benign prostatic hyperplasia) 02/28/2024    CAD (coronary artery disease)     Sees [facility], h/o stent    Chronic HFrEF (heart failure with reduced ejection fraction) 05/01/2024    Diabetes mellitus     Hypertension     Kidney stone   "   Stroke     age 62    Type 2 diabetes mellitus without complication, without long-term current use of insulin 10/08/2021       Past Surgical History:   Procedure Laterality Date    24 HOUR IMPEDANCE PH MONITORING OF ESOPHAGUS IN PATIENT NOT TAKING ACID REDUCING MEDICATIONS N/A 11/13/2024    Procedure: IMPEDANCE PH STUDY, ESOPHAGEAL, 24 HOUR, IN PATIENT NOT TAKING ACID REDUCING MEDICATION;  Surgeon: Stephany Mendoza MD;  Location: [facility] (4TH FLR);  Service: Endoscopy;  Laterality: N/A;  referral dr miguel/ off ppi / prep inst sent to pt via mail  11/6- precall attempted no answer. Unable to [facility]-  11/8 - pt not called, per chart review patient currently inpatient at [place] [address]    CARDIAC CATHETERIZATION      with stent    COLONOSCOPY N/A 03/27/2024    Procedure: COLONOSCOPY;  Surgeon: Ariela Castro MD;  Location: [facility];  Service: Endoscopy;  Laterality: N/A;    ESOPHAGEAL MANOMETRY WITH MEASUREMENT OF IMPEDANCE N/A 11/13/2024    Procedure: MANOMETRY, ESOPHAGUS, WITH IMPEDANCE MEASUREMENT;  Surgeon: Stephany Mendoza MD;  [facility]  Service: Endoscopy;  Laterality: N/A;    ESOPHAGOGASTRODUODENOSCOPY N/A 03/27/2024    Procedure: EGD (ESOPHAGOGASTRODUODENOSCOPY);  Surgeon: Ariela Castro MD;  Location: [facility];  Service: Endoscopy;  Laterality: N/A;    LEFT HEART CATHETERIZATION Left 9/10/2024    Procedure: Left heart cath;  Surgeon: Jemal Drummond MD;  Location: [facility] CATH LAB;  Service: Cardiology;  Laterality: Left;    SHOULDER SURGERY Right        Review of patient's allergies indicates:   Allergen Reactions    Dapagliflozin      Other reaction(s): Other (See Comments)    Pcn [penicillins]     Linagliptin Other (See Comments)     "it knocked me down", "it almost killed me"    Lisinopril Other (See Comments)     cough    Pantoprazole Hives       No current facility-administered medications on file prior to encounter.     Current Outpatient Medications on " File Prior to Encounter   Medication Sig    albuterol-ipratropium (DUO-NEB) 2.5 mg-0.5 mg/3 mL nebulizer solution Take 3 mLs by nebulization every 6 (six) hours as needed for Wheezing. Rescue    amLODIPine (NORVASC) 10 MG tablet Take 1 tablet (10 mg total) by mouth once daily.    arformoteroL (BROVANA) 15 mcg/2 mL Nebu Take 2 mLs (15 mcg total) by nebulization 2 (two) times daily. Controller    aspirin 81 MG Chew Take 81 mg by mouth once daily.    blood-glucose sensor (FREESTYLE RAMBO 3 PLUS SENSOR) Blanche Change every 15 days    budesonide (PULMICORT) 0.5 mg/2 mL nebulizer solution Take 4 mLs (1 mg total) by nebulization 2 (two) times daily. Controller    cholecalciferol, vitamin D3, (VITAMIN D3) 50 mcg (2,000 unit) Cap capsule Take 1 capsule by mouth once daily.    famotidine (PEPCID) 20 MG tablet Take 1 tablet (20 mg total) by mouth 2 (two) times daily as needed for Heartburn.    furosemide (LASIX) 20 MG tablet Take 1 tablet (20 mg total) by mouth once daily.    glimepiride (AMARYL) 4 MG tablet Take 1 tablet (4 mg total) by mouth before breakfast.    insulin aspart U-100 (NOVOLOG FLEXPEN U-100 INSULIN) 100 unit/mL (3 mL) InPn pen Inject as needed before meals: 180-230=+1, 231-280=+2, 281-330=+3, 331-380=+4, over 380=+5 units    latanoprost, PF, 0.005 % Drop 1 drop into affected eye in the evening Ophthalmic Once a day    metFORMIN (GLUCOPHAGE-XR) 500 MG ER 24hr tablet Take 1 tablet (500 mg total) by mouth 2 (two) times daily with meals.    metoprolol succinate (TOPROL-XL) 50 MG 24 hr tablet Take 1 tablet (50 mg total) by mouth once daily.    mirabegron (MYRBETRIQ) 25 mg Tb24 ER tablet Take 1 tablet (25 mg total) by mouth once daily.    mycophenolate (CELLCEPT) 250 mg Cap Take 4 capsules (1,000 mg total) by mouth 2 (two) times daily.    nintedanib (OFEV) 150 mg Cap Take 1 capsule (150 mg total) by mouth 2 (two) times a day.    nitroGLYCERIN (NITROSTAT) 0.3 MG SL tablet Place 1 tablet (0.3 mg total) under the tongue  "every 5 (five) minutes as needed for Chest pain.    omeprazole (PRILOSEC) 40 MG capsule Take 40 mg by mouth once daily.    ONETOUCH DELICA PLUS LANCET 33 gauge Misc Apply topically 3 (three) times daily.    pen needle, diabetic 32 gauge x 5/32" Ndle 1 each by Misc.(Non-Drug; Combo Route) route once daily.    predniSONE (DELTASONE) 20 MG tablet Take 2 tablets (40 mg total) by mouth once daily for 30 days, THEN 1 tablet (20 mg total) once daily for 30 days, THEN 0.5 tablets (10 mg total) once daily.    rosuvastatin (CRESTOR) 20 MG tablet Take 1 tablet (20 mg total) by mouth once daily.    sacubitriL-valsartan (ENTRESTO) 24-26 mg per tablet Take 1 tablet by mouth 2 (two) times daily.    sucralfate (CARAFATE) 1 gram tablet Take 1 tablet (1 g total) by mouth 4 (four) times daily before meals and nightly.    sulfamethoxazole-trimethoprim 800-160mg (BACTRIM DS) 800-160 mg Tab Take 1 tablet by mouth once daily.     Family History       Problem Relation (Age of Onset)    Cancer Mother    Colon cancer Sister          Tobacco Use    Smoking status: Never     Passive exposure: Never    Smokeless tobacco: Never   Substance and Sexual Activity    Alcohol use: No    Drug use: Never    Sexual activity: Not Currently     Review of Systems   Constitutional: Negative for decreased appetite.   HENT:  Negative for ear discharge.    Eyes:  Negative for blurred vision.   Endocrine: Negative for polyphagia.   Skin:  Negative for nail changes.   Genitourinary:  Negative for bladder incontinence.   Neurological:  Negative for aphonia.   Psychiatric/Behavioral:  Negative for hallucinations.    Allergic/Immunologic: Negative for hives.     Objective:     Vital Signs (Most Recent):  Temp: 98.2 °F (36.8 °C) (03/17/25 0726)  Pulse: 102 (03/17/25 0726)  Resp: 18 (03/17/25 0726)  BP: (!) 127/92 (03/17/25 0726)  SpO2: 98 % (03/17/25 0726) Vital Signs (24h Range):  Temp:  [97.4 °F (36.3 °C)-98.3 °F (36.8 °C)] 98.2 °F (36.8 °C)  Pulse:  [] " 102  Resp:  [18-26] 18  SpO2:  [94 %-99 %] 98 %  BP: (103-166)/(74-97) 127/92     Weight: 58.4 kg (128 lb 12 oz)  Body mass index is 19.58 kg/m².    SpO2: 98 %         Intake/Output Summary (Last 24 hours) at 3/17/2025 0906  Last data filed at 3/17/2025 0446  Gross per 24 hour   Intake --   Output 1100 ml   Net -1100 ml       Lines/Drains/Airways       Peripheral Intravenous Line  Duration                  Peripheral IV - Single Lumen 03/16/25 1120 20 G Right Antecubital <1 day                     Physical Exam  Constitutional:       Appearance: He is well-developed.   HENT:      Head: Normocephalic and atraumatic.   Eyes:      Conjunctiva/sclera: Conjunctivae normal.      Pupils: Pupils are equal, round, and reactive to light.   Cardiovascular:      Rate and Rhythm: Normal rate.      Pulses: Intact distal pulses.      Heart sounds: Normal heart sounds.   Pulmonary:      Effort: Pulmonary effort is normal.      Breath sounds: Normal breath sounds.   Abdominal:      General: Bowel sounds are normal.      Palpations: Abdomen is soft.   Musculoskeletal:         General: Normal range of motion.      Cervical back: Normal range of motion and neck supple.   Skin:     General: Skin is warm and dry.   Neurological:      Mental Status: He is alert and oriented to person, place, and time.          Significant Labs: All pertinent lab results from the last 24 hours have been reviewed.    Significant Imaging: Echocardiogram: 2D echo with color flow doppler: No results found for this or any previous visit.  Assessment and Plan:     Hyperlipidemia  On statin      Essential hypertension  Controlled  Current Antihypertensives  amLODIPine tablet 10 mg, Daily, Oral  furosemide tablet 20 mg, Daily, Oral  metoprolol succinate (TOPROL-XL) 24 hr tablet 50 mg, Daily, Oral  nitroGLYCERIN SL tablet 0.4 mg, Use PRN, Sublingual        Coronary artery disease involving native coronary artery of native heart with angina pectoris  CP during  exercise. No MI. Hx CAD with SAYDA stent and occluded D1 in stent nursing home. Last [facility] 9/10/24 with patent stent and non-obstructive CAD - medical Rx. Echo and stress test today. Large fixed anteroapical defect seen on stress test 6/17/24. Ok for d/c if stress test free of significant ischemia    Interstitial lung disease exacerbation  On home O2        VTE Risk Mitigation (From admission, onward)           Ordered     enoxaparin injection 40 mg  Daily         03/16/25 4287                    Thank you for your consult. I will follow-up with patient. Please contact us if you have any additional questions.    Jemal Drummond MD  Cardiology   [facility] - [place], [place] - Telemetry

## 2025-03-17 NOTE — ASSESSMENT & PLAN NOTE
Results for orders placed during the hospital encounter of 10/17/24    Echo    Interpretation Summary    Left Ventricle: Regional wall motion abnormalities present. There is normal systolic function with a visually estimated ejection fraction of 55 %. Grade I diastolic dysfunction. federico septal hypokinesis    Right Ventricle: Normal right ventricular cavity size. Systolic function is normal.    Left Atrium: Left atrium is mildly dilated.    IVC/SVC: Normal venous pressure at 3 mmHg.    Recent Labs   Lab 03/16/25  1131   *     Resume home meds listed below.   Cont to monitor I/O's and daily weights.  Fluid restriction (2 liters/24 hours)  Low Na diet  Monitor for signs of fluid overload: RR>30, O2 sat<92%, weight gain of >3 lbs, or urinary output <160ml/8hr  Maintain oxygen sats >92% via NC if supplemental oxygen needed.     Patient Vitals for the past 72 hrs (Last 3 readings):   Weight   03/16/25 2245 58.4 kg (128 lb 12 oz)     Diuretics (From admission, onward)      Start     Stop Route Frequency Ordered    03/17/25 0900  furosemide tablet 20 mg         -- Oral Daily 03/16/25 1546          Cardiac/Autonomic (From admission, onward)      Start     Stop Route Frequency Ordered    03/17/25 0900  amLODIPine tablet 10 mg         -- Oral Daily 03/16/25 1546    03/17/25 0900  metoprolol succinate (TOPROL-XL) 24 hr tablet 50 mg         -- Oral Daily 03/16/25 1546    03/16/25 2100  sacubitriL-valsartan 24-26 mg per tablet 1 tablet         -- Oral 2 times daily 03/16/25 1546    03/16/25 1701  nitroGLYCERIN SL tablet 0.4 mg         -- SL Use PRN 03/16/25 1601

## 2025-03-17 NOTE — ASSESSMENT & PLAN NOTE
Lab Results   Component Value Date     (H) 03/17/2025    AST 80 (H) 03/17/2025    ALKPHOS 119 03/17/2025    BILITOT 0.5 03/17/2025     Lab Results   Component Value Date    INR 1.0 03/16/2025    INR 1.2 11/05/2024    INR 1.2 07/07/2023     Lab Results   Component Value Date    ALBUMIN 2.9 (L) 03/17/2025     Drug toxicology  Acetaminophen level  Acute hepatitis panel  RUQ sonogram

## 2025-03-17 NOTE — HOSPITAL COURSE
Admitted for chest pain. CP during exercise. No MI. Hx CAD with SAYDA stent and occluded D1 in stent shelter. Last [facility] 9/10/24 with patent stent and non-obstructive CAD - medical Rx. Echo and stress test today. Large fixed anteroapical defect seen on stress test 6/17/24. Ok for d/c if stress test free of significant ischemia   echo    Left Ventricle: The left ventricle is normal in size. There is mildly reduced systolic function with a visually estimated ejection fraction of 40 - 45%.    Right Ventricle: The right ventricle is normal in size. Systolic function is normal.    Aortic Valve: There is mild aortic valve sclerosis.    Mitral Valve: There is moderate mitral annular calcification.    Aorta: Aortic root is mildly dilated measuring 3.73 cm.    Pulmonary Artery: The estimated pulmonary artery systolic pressure is 30 mmHg.    IVC/SVC: Normal venous pressure at 3 mmHg.    Nuclear stress:    Abnormal myocardial perfusion scan.    There is a severe intensity, fixed perfusion abnormality consistent with scar in the anteroapical wall.    The visually estimated ejection fraction is moderately depressed during stress.    The ECG portion of the study is negative for ischemia.    The patient reported no chest pain during the stress test.    There were no arrhythmias during stress.     F/u outpatient with primary cardiologist and PCP

## 2025-03-17 NOTE — ASSESSMENT & PLAN NOTE
Patient's FSGs are controlled on current medication regimen.  Last A1c reviewed-   Lab Results   Component Value Date    HGBA1C 7.8 (H) 01/02/2025     Most recent fingerstick glucose reviewed-   Recent Labs   Lab 03/16/25  1617 03/16/25  2354 03/17/25  0412 03/17/25  0837   POCTGLUCOSE 84 160* 126* 316*     Current correctional scale  Low  Maintain anti-hyperglycemic dose as follows-   Antihyperglycemics (From admission, onward)      Start     Stop Route Frequency Ordered    03/16/25 1641  insulin aspart U-100 pen 0-10 Units         -- SubQ Before meals & nightly PRN 03/16/25 1546          Hold Oral hypoglycemics while patient is in the hospital.

## 2025-03-17 NOTE — PLAN OF CARE
03/17/25 0857   Discharge Planning   Assessment Type Discharge Planning Brief Assessment   Resource/Environmental Concerns none   Support Systems Children   Equipment Currently Used at Home none   Current Living Arrangements home   Patient/Family Anticipates Transition to home   Patient/Family Anticipated Services at Transition none   DME Needed Upon Discharge  none   Discharge Plan A Home with family  (with instructions to follow up)       [id number] - [address] AT [address] [address]  Phone: [phone] Fax: [phone]    [facility] [address]  [address]   [facility] [address]  Phone: [phone] Fax: [phone]

## 2025-03-17 NOTE — DISCHARGE SUMMARY
[facility] Medicine  Discharge Summary      Patient Name: Emmanuel Grant  MRN: [id number]  ALVARO: [id number]  Patient Class: OP- Observation  Admission Date: 3/16/2025  Hospital Length of Stay: 0 days  Discharge Date and Time:  03/17/2025 2:50 PM  Attending Physician: Blanquita Hunt MD   Discharging Provider: Ranjith Patino NP  Primary Care Provider: Wilfredo De Souza MD    Primary Care Team:  Hosp Med NAIF 1    HPI:   History of Present Illness:  Patient is a 74 y.o. male who has a past medical history of interstitial lung disease with chronic hypoxic respiratory failure on 2 L O2 via NC at home,  BPH (benign prostatic hyperplasia), CAD (coronary artery disease), Chronic HFrEF (heart failure with reduced ejection fraction), Diabetes mellitus, Hypertension, Kidney stone, Stroke insulin presented with chest pain. Onset was 1 day ago, with worsening course since that time. The patient describes the pain as intermittent, pressure like in nature, does not radiate. Patient rates pain as a 5/10 in intensity.  Associated symptoms are dyspnea and fatigue. Aggravating factors are exercise.  Alleviating factors are is rest. He currently does not have any chest pain. Patient currently denies any fever/chills, weakness, numbness, abdominal pain, nausea/vomiting, dysuria/hematuria, or weight loss.     * No surgery found *      Hospital Course:   Admitted for chest pain. CP during exercise. No MI. Hx CAD with SAYDA stent and occluded D1 in stent skilled nursing. Last [facility] 9/10/24 with patent stent and non-obstructive CAD - medical Rx. Echo and stress test today. Large fixed anteroapical defect seen on stress test 6/17/24. Ok for d/c if stress test free of significant ischemia   echo    Left Ventricle: The left ventricle is normal in size. There is mildly reduced systolic function with a visually estimated ejection fraction of 40 - 45%.    Right Ventricle: The right ventricle is normal in size. Systolic function is  normal.    Aortic Valve: There is mild aortic valve sclerosis.    Mitral Valve: There is moderate mitral annular calcification.    Aorta: Aortic root is mildly dilated measuring 3.73 cm.    Pulmonary Artery: The estimated pulmonary artery systolic pressure is 30 mmHg.    IVC/SVC: Normal venous pressure at 3 mmHg.    Nuclear stress:    Abnormal myocardial perfusion scan.    There is a severe intensity, fixed perfusion abnormality consistent with scar in the anteroapical wall.    The visually estimated ejection fraction is moderately depressed during stress.    The ECG portion of the study is negative for ischemia.    The patient reported no chest pain during the stress test.    There were no arrhythmias during stress.     F/u outpatient with primary cardiologist and PCP       Goals of Care Treatment Preferences:  Code Status: Full Code         Consults:   Consults (From admission, onward)          Status Ordering Provider     Inpatient consult to Cardiology  Once        Provider:  (Not yet assigned)    Completed JOSE CRUZ OVALLES            Assessment & Plan  Chest pain  Recent Labs   Lab 03/16/25 2022   TROPONINI 0.011   Consulted card  CP during exercise. No MI. Hx CAD with SAYDA stent and occluded D1 in [facility] prison. Last [facility] 9/10/24 with patent stent and non-obstructive CAD - medical Rx. Echo and stress test today. Large fixed anteroapical defect seen on stress test 6/17/24. Ok for d/c if stress test free of significant ischemia   echo    Left Ventricle: The left ventricle is normal in size. There is mildly reduced systolic function with a visually estimated ejection fraction of 40 - 45%.    Right Ventricle: The right ventricle is normal in size. Systolic function is normal.    Aortic Valve: There is mild aortic valve sclerosis.    Mitral Valve: There is moderate mitral annular calcification.    Aorta: Aortic root is mildly dilated measuring 3.73 cm.    Pulmonary Artery: The estimated pulmonary artery systolic pressure  is 30 mmHg.    IVC/SVC: Normal venous pressure at 3 mmHg.  Nuclear stress test:    Abnormal myocardial perfusion scan.    There is a severe intensity, fixed perfusion abnormality consistent with scar in the anteroapical wall.    The visually estimated ejection fraction is moderately depressed during stress.    The ECG portion of the study is negative for ischemia.    The patient reported no chest pain during the stress test.    There were no arrhythmias during stress.     Type 2 diabetes mellitus without complication, without long-term current use of insulin  Patient's FSGs are controlled on current medication regimen.  Last A1c reviewed-   Lab Results   Component Value Date    HGBA1C 7.8 (H) 01/02/2025     Most recent fingerstick glucose reviewed-   Recent Labs   Lab 03/16/25  1617 03/16/25  2354 03/17/25  0412 03/17/25  0837   POCTGLUCOSE 84 160* 126* 316*     Current correctional scale  Low  Maintain anti-hyperglycemic dose as follows-   Antihyperglycemics (From admission, onward)      Start     Stop Route Frequency Ordered    03/16/25 1641  insulin aspart U-100 pen 0-10 Units         -- SubQ Before meals & nightly PRN 03/16/25 1546          Hold Oral hypoglycemics while patient is in the hospital.  Interstitial lung disease exacerbation  Resume home cellcept.   Optimize lung function with bronchodilators including B2 adrenergic agonist and anticholinergics.   Supplemental oxygen via NC in order to assure adequate oxygenation.             GERD (gastroesophageal reflux disease)  PPI    Essential hypertension  BP Readings from Last 3 Encounters:   03/17/25 119/82   02/27/25 122/70   02/26/25 128/68     Continuing home medications as prescribed  Will cont to monitor and adjust as needed  Will utilize p.r.n. blood pressure medication only if patient's blood pressure greater than 180/110 and he develops symptoms such as worsening chest pain or shortness of breath.  Cardiac diet    Current  Antihypertensives:  amLODIPine tablet 10 mg, Daily, Oral  furosemide tablet 20 mg, Daily, Oral  metoprolol succinate (TOPROL-XL) 24 hr tablet 50 mg, Daily, Oral  nitroGLYCERIN SL tablet 0.4 mg, Use PRN, Sublingual    Hyperlipidemia  Lab Results   Component Value Date    LDLCALC 55.4 (L) 10/18/2024     Patient is chronically on statin  Continue home medication  Monitor for acute changes    rosuvastatin - 20 MG    Chronic HFrEF (heart failure with reduced ejection fraction)  Results for orders placed during the hospital encounter of 10/17/24    Echo    Interpretation Summary    Left Ventricle: Regional wall motion abnormalities present. There is normal systolic function with a visually estimated ejection fraction of 55 %. Grade I diastolic dysfunction. federico septal hypokinesis    Right Ventricle: Normal right ventricular cavity size. Systolic function is normal.    Left Atrium: Left atrium is mildly dilated.    IVC/SVC: Normal venous pressure at 3 mmHg.    Recent Labs   Lab 03/16/25  1131   *     Resume home meds listed below.   Cont to monitor I/O's and daily weights.  Fluid restriction (2 liters/24 hours)  Low Na diet  Monitor for signs of fluid overload: RR>30, O2 sat<92%, weight gain of >3 lbs, or urinary output <160ml/8hr  Maintain oxygen sats >92% via NC if supplemental oxygen needed.     Patient Vitals for the past 72 hrs (Last 3 readings):   Weight   03/16/25 2245 58.4 kg (128 lb 12 oz)     Diuretics (From admission, onward)      Start     Stop Route Frequency Ordered    03/17/25 0900  furosemide tablet 20 mg         -- Oral Daily 03/16/25 1546          Cardiac/Autonomic (From admission, onward)      Start     Stop Route Frequency Ordered    03/17/25 0900  amLODIPine tablet 10 mg         -- Oral Daily 03/16/25 1546    03/17/25 0900  metoprolol succinate (TOPROL-XL) 24 hr tablet 50 mg         -- Oral Daily 03/16/25 1546    03/16/25 2100  sacubitriL-valsartan 24-26 mg per tablet 1 tablet         -- Oral  2 times daily 03/16/25 1546    03/16/25 1701  nitroGLYCERIN SL tablet 0.4 mg         -- SL Use PRN 03/16/25 1601              Immunosuppression due to drug therapy  Resume home meds    Chronic hypoxemic respiratory failure  Patient with Hypoxic Respiratory failure which is Chronic.  he is on home oxygen at 2 LPM. Supplemental oxygen was provided and noted-     Stable   Transaminitis  Lab Results   Component Value Date     (H) 03/17/2025    AST 80 (H) 03/17/2025    ALKPHOS 119 03/17/2025    BILITOT 0.5 03/17/2025     Lab Results   Component Value Date    INR 1.0 03/16/2025    INR 1.2 11/05/2024    INR 1.2 07/07/2023     Lab Results   Component Value Date    ALBUMIN 2.9 (L) 03/17/2025     Drug toxicology  Acetaminophen level  Acute hepatitis panel  RUQ sonogram   Final Active Diagnoses:    Diagnosis Date Noted POA    PRINCIPAL PROBLEM:  Chest pain [R07.9] 06/16/2024 Yes    Transaminitis [R74.01] 03/16/2025 Yes    Chronic hypoxemic respiratory failure [J96.11] 12/10/2024 Yes    Immunosuppression due to drug therapy [D84.821, Z79.899] 10/10/2024 Not Applicable    Chronic HFrEF (heart failure with reduced ejection fraction) [I50.22] 05/01/2024 Yes    GERD (gastroesophageal reflux disease) [K21.9] 04/20/2024 Yes    Interstitial lung disease exacerbation [J84.9] 02/28/2024 Yes    Type 2 diabetes mellitus without complication, without long-term current use of insulin [E11.9] 10/08/2021 Yes    Essential hypertension [I10] 05/29/2020 Yes    Hyperlipidemia [E78.5] 05/29/2020 Yes      Problems Resolved During this Admission:       Discharged Condition: stable    [place] [facility]    Follow Up:   Follow-up Information       Wilfredo De Souza MD. Schedule an appointment as soon as possible for a visit in 1 week(s).    Specialty: Internal Medicine  Why: If symptoms worsen, As needed  Contact information:  [address] [facility]  [place] [address]  [phone]                           Patient Instructions:   No  "discharge procedures on file.    Significant Diagnostic Studies: Labs: BMP:   Recent Labs   Lab 03/16/25  1131 03/17/25  0450   * 122*    136   K 4.3 3.7    104   CO2 23 24   BUN 13 7*   CREATININE 1.2 0.8   CALCIUM 8.8 8.5*   MG  --  1.7   , CMP   Recent Labs   Lab 03/16/25  1131 03/17/25  0450    136   K 4.3 3.7    104   CO2 23 24   * 122*   BUN 13 7*   CREATININE 1.2 0.8   CALCIUM 8.8 8.5*   PROT 7.1 6.7   ALBUMIN 3.1* 2.9*   BILITOT 0.5 0.5   ALKPHOS 143 119   * 80*   * 181*   ANIONGAP 10 8   , CBC   Recent Labs   Lab 03/16/25  1131 03/17/25  0450   WBC 6.90 7.34   HGB 13.4* 13.0*   HCT 39.4* 39.2*    179   , INR   Lab Results   Component Value Date    INR 1.0 03/16/2025    INR 1.2 11/05/2024    INR 1.2 07/07/2023   , Lipid Panel   Lab Results   Component Value Date    CHOL 103 (L) 10/18/2024    HDL 32 (L) 10/18/2024    LDLCALC 55.4 (L) 10/18/2024    TRIG 78 10/18/2024    CHOLHDL 31.1 10/18/2024   , Troponin   Recent Labs   Lab 03/16/25  1131 03/16/25  1415 03/16/25 2022   TROPONINI 0.011 0.012 0.011   , A1C:   Recent Labs   Lab 09/25/24  1159 01/02/25  0804   HGBA1C 8.0* 7.8*   , and All labs within the past 24 hours have been reviewed    Pending Diagnostic Studies:       None           Medications:  Reconciled Home Medications:      Medication List        CONTINUE taking these medications      albuterol-ipratropium 2.5 mg-0.5 mg/3 mL nebulizer solution  Commonly known as: DUO-NEB  Take 3 mLs by nebulization every 6 (six) hours as needed for Wheezing. Rescue     amLODIPine 10 MG tablet  Commonly known as: NORVASC  Take 1 tablet (10 mg total) by mouth once daily.     arformoteroL 15 mcg/2 mL Nebu  Commonly known as: BROVANA  Take 2 mLs (15 mcg total) by nebulization 2 (two) times daily. Controller     aspirin 81 MG Chew  Take 81 mg by mouth once daily.     BD ULTRA-FINE HEATHER PEN NEEDLE 32 gauge x 5/32" Ndle  Generic drug: pen needle, diabetic  1 each by " Misc.(Non-Drug; Combo Route) route once daily.     budesonide 0.5 mg/2 mL nebulizer solution  Commonly known as: PULMICORT  Take 4 mLs (1 mg total) by nebulization 2 (two) times daily. Controller     cholecalciferol (vitamin D3) 50 mcg (2,000 unit) Cap capsule  Commonly known as: VITAMIN D3  Take 1 capsule by mouth once daily.     ENTRESTO 24-26 mg per tablet  Generic drug: sacubitriL-valsartan  Take 1 tablet by mouth 2 (two) times daily.     famotidine 20 MG tablet  Commonly known as: PEPCID  Take 1 tablet (20 mg total) by mouth 2 (two) times daily as needed for Heartburn.     FREESTYLE RAMBO 3 PLUS SENSOR Blanche  Generic drug: blood-glucose sensor  Change every 15 days     furosemide 20 MG tablet  Commonly known as: LASIX  Take 1 tablet (20 mg total) by mouth once daily.     glimepiride 4 MG tablet  Commonly known as: AMARYL  Take 1 tablet (4 mg total) by mouth before breakfast.     insulin aspart U-100 100 unit/mL (3 mL) Inpn pen  Commonly known as: NovoLOG Flexpen U-100 Insulin  Inject as needed before meals: 180-230=+1, 231-280=+2, 281-330=+3, 331-380=+4, over 380=+5 units     latanoprost (PF) 0.005 % Drop  1 drop into affected eye in the evening Ophthalmic Once a day     metFORMIN 500 MG ER 24hr tablet  Commonly known as: GLUCOPHAGE-XR  Take 1 tablet (500 mg total) by mouth 2 (two) times daily with meals.     metoprolol succinate 50 MG 24 hr tablet  Commonly known as: TOPROL-XL  Take 1 tablet (50 mg total) by mouth once daily.     mirabegron 25 mg Tb24 ER tablet  Commonly known as: MYRBETRIQ  Take 1 tablet (25 mg total) by mouth once daily.     mycophenolate 250 mg Cap  Commonly known as: CELLCEPT  Take 4 capsules (1,000 mg total) by mouth 2 (two) times daily.     nitroGLYCERIN 0.3 MG SL tablet  Commonly known as: NITROSTAT  Place 1 tablet (0.3 mg total) under the tongue every 5 (five) minutes as needed for Chest pain.     OFEV 150 mg Cap  Generic drug: nintedanib  Take 1 capsule (150 mg total) by mouth 2 (two)  times a day.     omeprazole 40 MG capsule  Commonly known as: PRILOSEC  Take 40 mg by mouth once daily.     ONETOUCH DELICA PLUS LANCET 33 gauge Misc  Generic drug: lancets  Apply topically 3 (three) times daily.     predniSONE 20 MG tablet  Commonly known as: DELTASONE  Take 2 tablets (40 mg total) by mouth once daily for 30 days, THEN 1 tablet (20 mg total) once daily for 30 days, THEN 0.5 tablets (10 mg total) once daily.  Start taking on: February 17, 2025     rosuvastatin 20 MG tablet  Commonly known as: CRESTOR  Take 1 tablet (20 mg total) by mouth once daily.     sucralfate 1 gram tablet  Commonly known as: CARAFATE  Take 1 tablet (1 g total) by mouth 4 (four) times daily before meals and nightly.     sulfamethoxazole-trimethoprim 800-160mg 800-160 mg Tab  Commonly known as: BACTRIM DS  Take 1 tablet by mouth once daily.              Indwelling Lines/Drains at time of discharge:   Lines/Drains/Airways       None                   Time spent on the discharge of patient: 30 minutes         Ranjith Patino NP  Department of Hospital Medicine  [facility] - Telemetry   168

## 2025-03-17 NOTE — SUBJECTIVE & OBJECTIVE
Past Medical History:   Diagnosis Date    Acute hypoxic respiratory failure 09/23/2024    Oxygen therapy 24/7 now, continue 2 L oxygen therapy.      BPH (benign prostatic hyperplasia) 02/28/2024    CAD (coronary artery disease)     Sees [facility], h/o stent    Chronic HFrEF (heart failure with reduced ejection fraction) 05/01/2024    Diabetes mellitus     Hypertension     Kidney stone     Stroke     age 62    Type 2 diabetes mellitus without complication, without long-term current use of insulin 10/08/2021       Past Surgical History:   Procedure Laterality Date    24 HOUR IMPEDANCE PH MONITORING OF ESOPHAGUS IN PATIENT NOT TAKING ACID REDUCING MEDICATIONS N/A 11/13/2024    Procedure: IMPEDANCE PH STUDY, ESOPHAGEAL, 24 HOUR, IN PATIENT NOT TAKING ACID REDUCING MEDICATION;  Surgeon: Stephany Mendoza MD;  Location: [facility] (4TH FLR);  Service: Endoscopy;  Laterality: N/A;  referral dr miguel/ off ppi / prep inst sent to pt via mail  11/6- precall attempted no answer. Unable to [facility]-  11/8 - pt not called, per chart review patient currently inpatient at [place] [address]    CARDIAC CATHETERIZATION      with stent    COLONOSCOPY N/A 03/27/2024    Procedure: COLONOSCOPY;  Surgeon: Ariela Castro MD;  Location: [facility];  Service: Endoscopy;  Laterality: N/A;    ESOPHAGEAL MANOMETRY WITH MEASUREMENT OF IMPEDANCE N/A 11/13/2024    Procedure: MANOMETRY, ESOPHAGUS, WITH IMPEDANCE MEASUREMENT;  Surgeon: Stephany Mendoza MD;  Location: [facility] (4TH FLR);  Service: Endoscopy;  Laterality: N/A;    ESOPHAGOGASTRODUODENOSCOPY N/A 03/27/2024    Procedure: EGD (ESOPHAGOGASTRODUODENOSCOPY);  Surgeon: Ariela Castro MD;  Location: [facility] ENDO;  Service: Endoscopy;  Laterality: N/A;    LEFT HEART CATHETERIZATION Left 9/10/2024    Procedure: Left heart cath;  Surgeon: Jemal Drummond MD;  Location: [facility] CATH LAB;  Service: Cardiology;  Laterality: Left;    SHOULDER SURGERY Right        Review of patient's allergies  "indicates:   Allergen Reactions    Dapagliflozin      Other reaction(s): Other (See Comments)    Pcn [penicillins]     Linagliptin Other (See Comments)     "it knocked me down", "it almost killed me"    Lisinopril Other (See Comments)     cough    Pantoprazole Hives       No current facility-administered medications on file prior to encounter.     Current Outpatient Medications on File Prior to Encounter   Medication Sig    albuterol-ipratropium (DUO-NEB) 2.5 mg-0.5 mg/3 mL nebulizer solution Take 3 mLs by nebulization every 6 (six) hours as needed for Wheezing. Rescue    amLODIPine (NORVASC) 10 MG tablet Take 1 tablet (10 mg total) by mouth once daily.    arformoteroL (BROVANA) 15 mcg/2 mL Nebu Take 2 mLs (15 mcg total) by nebulization 2 (two) times daily. Controller    aspirin 81 MG Chew Take 81 mg by mouth once daily.    blood-glucose sensor (FREESTYLE RAMBO 3 PLUS SENSOR) Blanche Change every 15 days    budesonide (PULMICORT) 0.5 mg/2 mL nebulizer solution Take 4 mLs (1 mg total) by nebulization 2 (two) times daily. Controller    cholecalciferol, vitamin D3, (VITAMIN D3) 50 mcg (2,000 unit) Cap capsule Take 1 capsule by mouth once daily.    famotidine (PEPCID) 20 MG tablet Take 1 tablet (20 mg total) by mouth 2 (two) times daily as needed for Heartburn.    furosemide (LASIX) 20 MG tablet Take 1 tablet (20 mg total) by mouth once daily.    glimepiride (AMARYL) 4 MG tablet Take 1 tablet (4 mg total) by mouth before breakfast.    insulin aspart U-100 (NOVOLOG FLEXPEN U-100 INSULIN) 100 unit/mL (3 mL) InPn pen Inject as needed before meals: 180-230=+1, 231-280=+2, 281-330=+3, 331-380=+4, over 380=+5 units    latanoprost, PF, 0.005 % Drop 1 drop into affected eye in the evening Ophthalmic Once a day    metFORMIN (GLUCOPHAGE-XR) 500 MG ER 24hr tablet Take 1 tablet (500 mg total) by mouth 2 (two) times daily with meals.    metoprolol succinate (TOPROL-XL) 50 MG 24 hr tablet Take 1 tablet (50 mg total) by mouth once daily. " "   mirabegron (MYRBETRIQ) 25 mg Tb24 ER tablet Take 1 tablet (25 mg total) by mouth once daily.    mycophenolate (CELLCEPT) 250 mg Cap Take 4 capsules (1,000 mg total) by mouth 2 (two) times daily.    nintedanib (OFEV) 150 mg Cap Take 1 capsule (150 mg total) by mouth 2 (two) times a day.    nitroGLYCERIN (NITROSTAT) 0.3 MG SL tablet Place 1 tablet (0.3 mg total) under the tongue every 5 (five) minutes as needed for Chest pain.    omeprazole (PRILOSEC) 40 MG capsule Take 40 mg by mouth once daily.    ONETOUCH DELICA PLUS LANCET 33 gauge Misc Apply topically 3 (three) times daily.    pen needle, diabetic 32 gauge x 5/32" Ndle 1 each by Misc.(Non-Drug; Combo Route) route once daily.    predniSONE (DELTASONE) 20 MG tablet Take 2 tablets (40 mg total) by mouth once daily for 30 days, THEN 1 tablet (20 mg total) once daily for 30 days, THEN 0.5 tablets (10 mg total) once daily.    rosuvastatin (CRESTOR) 20 MG tablet Take 1 tablet (20 mg total) by mouth once daily.    sacubitriL-valsartan (ENTRESTO) 24-26 mg per tablet Take 1 tablet by mouth 2 (two) times daily.    sucralfate (CARAFATE) 1 gram tablet Take 1 tablet (1 g total) by mouth 4 (four) times daily before meals and nightly.    sulfamethoxazole-trimethoprim 800-160mg (BACTRIM DS) 800-160 mg Tab Take 1 tablet by mouth once daily.     Family History       Problem Relation (Age of Onset)    Cancer Mother    Colon cancer Sister          Tobacco Use    Smoking status: Never     Passive exposure: Never    Smokeless tobacco: Never   Substance and Sexual Activity    Alcohol use: No    Drug use: Never    Sexual activity: Not Currently     Review of Systems   Constitutional: Negative for decreased appetite.   HENT:  Negative for ear discharge.    Eyes:  Negative for blurred vision.   Endocrine: Negative for polyphagia.   Skin:  Negative for nail changes.   Genitourinary:  Negative for bladder incontinence.   Neurological:  Negative for aphonia.   Psychiatric/Behavioral:  " Negative for hallucinations.    Allergic/Immunologic: Negative for hives.     Objective:     Vital Signs (Most Recent):  Temp: 98.2 °F (36.8 °C) (03/17/25 0726)  Pulse: 102 (03/17/25 0726)  Resp: 18 (03/17/25 0726)  BP: (!) 127/92 (03/17/25 0726)  SpO2: 98 % (03/17/25 0726) Vital Signs (24h Range):  Temp:  [97.4 °F (36.3 °C)-98.3 °F (36.8 °C)] 98.2 °F (36.8 °C)  Pulse:  [] 102  Resp:  [18-26] 18  SpO2:  [94 %-99 %] 98 %  BP: (103-166)/(74-97) 127/92     Weight: 58.4 kg (128 lb 12 oz)  Body mass index is 19.58 kg/m².    SpO2: 98 %         Intake/Output Summary (Last 24 hours) at 3/17/2025 0906  Last data filed at 3/17/2025 0446  Gross per 24 hour   Intake --   Output 1100 ml   Net -1100 ml       Lines/Drains/Airways       Peripheral Intravenous Line  Duration                  Peripheral IV - Single Lumen 03/16/25 1120 20 G Right Antecubital <1 day                     Physical Exam  Constitutional:       Appearance: He is well-developed.   HENT:      Head: Normocephalic and atraumatic.   Eyes:      Conjunctiva/sclera: Conjunctivae normal.      Pupils: Pupils are equal, round, and reactive to light.   Cardiovascular:      Rate and Rhythm: Normal rate.      Pulses: Intact distal pulses.      Heart sounds: Normal heart sounds.   Pulmonary:      Effort: Pulmonary effort is normal.      Breath sounds: Normal breath sounds.   Abdominal:      General: Bowel sounds are normal.      Palpations: Abdomen is soft.   Musculoskeletal:         General: Normal range of motion.      Cervical back: Normal range of motion and neck supple.   Skin:     General: Skin is warm and dry.   Neurological:      Mental Status: He is alert and oriented to person, place, and time.          Significant Labs: All pertinent lab results from the last 24 hours have been reviewed.    Significant Imaging: Echocardiogram: 2D echo with color flow doppler: No results found for this or any previous visit.

## 2025-03-17 NOTE — ASSESSMENT & PLAN NOTE
Lab Results   Component Value Date    LDLCALC 55.4 (L) 10/18/2024     Patient is chronically on statin  Continue home medication  Monitor for acute changes    rosuvastatin - 20 MG

## 2025-03-17 NOTE — NURSING
[facility]  Nurses Note -- 4 Eyes      3/17/2025       Skin assessed on: Q Shift      [x] No Pressure Injuries Present    [x]Prevention Measures Documented    [] Yes LDA  for Pressure Injury Previously documented     [] Yes New Pressure Injury Discovered   [] LDA for New Pressure Injury Added      Attending RN:  Leyla Menjivar, RN     Second RN:  Rebeca BULLARD RN

## 2025-03-17 NOTE — ASSESSMENT & PLAN NOTE
Recent Labs   Lab 03/16/25 2022   TROPONINI 0.011   Consulted card  CP during exercise. No MI. Hx CAD with SAYDA stent and occluded D1 in stent shelter. Last [facility] 9/10/24 with patent stent and non-obstructive CAD - medical Rx. Echo and stress test today. Large fixed anteroapical defect seen on stress test 6/17/24. Ok for d/c if stress test free of significant ischemia   echo    Left Ventricle: The left ventricle is normal in size. There is mildly reduced systolic function with a visually estimated ejection fraction of 40 - 45%.    Right Ventricle: The right ventricle is normal in size. Systolic function is normal.    Aortic Valve: There is mild aortic valve sclerosis.    Mitral Valve: There is moderate mitral annular calcification.    Aorta: Aortic root is mildly dilated measuring 3.73 cm.    Pulmonary Artery: The estimated pulmonary artery systolic pressure is 30 mmHg.    IVC/SVC: Normal venous pressure at 3 mmHg.  Nuclear stress test:    Abnormal myocardial perfusion scan.    There is a severe intensity, fixed perfusion abnormality consistent with scar in the anteroapical wall.    The visually estimated ejection fraction is moderately depressed during stress.    The ECG portion of the study is negative for ischemia.    The patient reported no chest pain during the stress test.    There were no arrhythmias during stress.

## 2025-03-17 NOTE — HPI
HPI: History of Present Illness:  Patient is a 74 y.o. male who has a past medical history of interstitial lung disease with chronic hypoxic respiratory failure on 2 L O2 via NC at home,  BPH (benign prostatic hyperplasia), CAD (coronary artery disease), Chronic HFrEF (heart failure with reduced ejection fraction), Diabetes mellitus, Hypertension, Kidney stone, Stroke insulin presented with chest pain. Onset was 1 day ago, with worsening course since that time. The patient describes the pain as intermittent, pressure like in nature, does not radiate. Patient rates pain as a 5/10 in intensity.  Associated symptoms are dyspnea and fatigue. Aggravating factors are exercise.  Alleviating factors are is rest. He currently does not have any chest pain. Patient currently denies any fever/chills, weakness, numbness, abdominal pain, nausea/vomiting, dysuria/hematuria, or weight loss.     CP yesterday while riding bike. Denies CP or SOB currently  EKG sinus tachycardia 109 PRWP  Troponin negative x 3      Saw me during admission 9/10/24    9/10/24 [facility] - EDP 17, LAD stent patent - luminal irregularities otherwise, D1  - stent long term - fills late - small vessel, Cx/RCA luminal irregularities     Echo 10/17/24    Left Ventricle: Regional wall motion abnormalities present. There is normal systolic function with a visually estimated ejection fraction of 55 %. Grade I diastolic dysfunction. federico septal hypokinesis    Right Ventricle: Normal right ventricular cavity size. Systolic function is normal.    Left Atrium: Left atrium is mildly dilated.    IVC/SVC: Normal venous pressure at 3 mmHg.      Followed by Dr Ventura  # CAD/MI/PCI, MPI 6/2024 with predom fixed LAD defect and mild LV dysfxn.  # ?HFmEF, some crackles and SOB on exam today (but also with ILD)  # ILD, follows with Dr. Schmitt  # DM  # HTN, uncontrolled  # HLP on atorva 40mg  # ao root dil, 3.8cm (echo 6/2024)       Cardiovascular Testing:  L MPI 6/17/24      Abnormal myocardial perfusion scan.    There is a severe intensity, medium sized, mostly fixed perfusion abnormality with minimal reversibilty in the mid to apical anterior and apical wall(s) in the typical distribution of the LAD territory.  This is conssistent with scar and minimal periinfarct ischemia.    There are no other significant perfusion abnormalities.    The gated perfusion images showed an ejection fraction of 40% post stress.    There is anteroapical wall akinesis during stress.     Echo 6/17/24 (images personally reviewed and interpreted)    Left Ventricle: The left ventricle is normal in size. Normal wall thickness. Mild global hypokinesis present, cannot exclude anterolateral WMA. There is mildly reduced systolic function with a visually estimated ejection fraction of 40 - 45%. Grade I diastolic dysfunction.    Right Ventricle: Normal right ventricular cavity size. Systolic function is normal.    Aorta: Aortic root is mildly dilated measuring 3.80 cm.

## 2025-03-17 NOTE — PLAN OF CARE
03/17/25 1417   Final Note   Assessment Type Final Discharge Note   Anticipated Discharge Disposition Home   Hospital Resources/Appts/Education Provided Appointments scheduled and added to AVS   Post-Acute Status   Post-Acute Authorization Other   Other Status No Post-Acute Service Needs     Pts nurse Leyla notified that the pt can d/c from CM standpoint

## 2025-03-17 NOTE — SUBJECTIVE & OBJECTIVE
Interval History: patient seen and examined. NAD noted. Patient has a constant Nonproductive cough. Added tessalon pearles as needed. Abnormal stress test. Awaiting cards recs. This requires for patient to be inpatient.     Review of Systems   Constitutional:  Positive for activity change and fatigue. Negative for appetite change, chills, diaphoresis and fever.   HENT:  Negative for congestion, hearing loss and trouble swallowing.    Respiratory:  Positive for shortness of breath. Negative for cough, chest tightness and wheezing.    Cardiovascular:  Positive for chest pain. Negative for palpitations and leg swelling.   Gastrointestinal:  Negative for abdominal distention, abdominal pain, constipation, diarrhea, nausea and vomiting.   Endocrine: Negative.    Genitourinary:  Negative for difficulty urinating and dysuria.   Skin: Negative.    Neurological:  Negative for syncope, facial asymmetry and speech difficulty.   Psychiatric/Behavioral:  Negative for confusion and hallucinations. The patient is not nervous/anxious.      Objective:     Vital Signs (Most Recent):  Temp: 98.2 °F (36.8 °C) (03/17/25 0726)  Pulse: 81 (03/17/25 1439)  Resp: 18 (03/17/25 1359)  BP: 119/82 (03/17/25 1359)  SpO2: 95 % (03/17/25 1359) Vital Signs (24h Range):  Temp:  [97.4 °F (36.3 °C)-98.3 °F (36.8 °C)] 98.2 °F (36.8 °C)  Pulse:  [] 81  Resp:  [18-24] 18  SpO2:  [94 %-99 %] 95 %  BP: (119-166)/(74-97) 119/82     Weight: 58.4 kg (128 lb 12 oz)  Body mass index is 19.58 kg/m².    Intake/Output Summary (Last 24 hours) at 3/17/2025 1443  Last data filed at 3/17/2025 0951  Gross per 24 hour   Intake 240 ml   Output 1100 ml   Net -860 ml         Physical Exam  Vitals and nursing note reviewed.   Constitutional:       General: He is awake. He is not in acute distress.     Appearance: Normal appearance. He is well-developed and well-groomed. He is not ill-appearing, toxic-appearing or diaphoretic.      Interventions: Nasal cannula in  place.   HENT:      Head: Normocephalic and atraumatic.   Cardiovascular:      Rate and Rhythm: Normal rate.   Pulmonary:      Effort: Pulmonary effort is normal. No tachypnea, accessory muscle usage or respiratory distress.   Neurological:      Mental Status: He is alert and oriented to person, place, and time. Mental status is at baseline.   Psychiatric:         Attention and Perception: Attention normal. He is attentive.         Mood and Affect: Mood normal. Mood is not anxious.         Speech: Speech normal.         Behavior: Behavior normal. Behavior is cooperative.         Thought Content: Thought content normal.         Cognition and Memory: Cognition and memory normal. Cognition is not impaired. Memory is not impaired. He does not exhibit impaired recent memory.         Judgment: Judgment normal.               Significant Labs: All pertinent labs within the past 24 hours have been reviewed.    Significant Imaging: I have reviewed all pertinent imaging results/findings within the past 24 hours.

## 2025-03-17 NOTE — ASSESSMENT & PLAN NOTE
BP Readings from Last 3 Encounters:   03/17/25 119/82   02/27/25 122/70   02/26/25 128/68     Continuing home medications as prescribed  Will cont to monitor and adjust as needed  Will utilize p.r.n. blood pressure medication only if patient's blood pressure greater than 180/110 and he develops symptoms such as worsening chest pain or shortness of breath.  Cardiac diet    Current Antihypertensives:  amLODIPine tablet 10 mg, Daily, Oral  furosemide tablet 20 mg, Daily, Oral  metoprolol succinate (TOPROL-XL) 24 hr tablet 50 mg, Daily, Oral  nitroGLYCERIN SL tablet 0.4 mg, Use PRN, Sublingual

## 2025-03-18 ENCOUNTER — TELEPHONE (OUTPATIENT)
Dept: OPHTHALMOLOGY | Facility: CLINIC | Age: 75
End: 2025-03-18
Payer: MEDICARE

## 2025-03-18 ENCOUNTER — TELEPHONE (OUTPATIENT)
Dept: PULMONOLOGY | Facility: CLINIC | Age: 75
End: 2025-03-18
Payer: MEDICARE

## 2025-03-18 NOTE — TELEPHONE ENCOUNTER
Lesly Wolf, [facility] Staff  Caller: Unspecified (Today, 12:23 PM)  Who called:  Pt  What is the request in detail:  Requesting to discuss new eye glasses , current pair not working and results  Can the clinic reply by [facility]? No    Would the patient rather a call back or a response via [facility]? Call back  Callback  Best call back number:  Telephone Information:  Mobile          [phone]     Additional Information:  Asking for call today  Thank you.  Returned pt call unable to leave message. Voicemail box full.

## 2025-03-18 NOTE — TELEPHONE ENCOUNTER
----- Message from Cristian Grace sent at 3/18/2025 12:26 PM CDT -----  Who called:Pt What is the request in detail:Oxygen tank won't keep a charge , pt has to take more breaks than usual Can the clinic reply by [facility]? NoWould the patient rather a call back or a response via [facility]? Call backCallback Best call back number:Telephone Information:[employer]          [phone] Additional Information:Thank you.

## 2025-03-18 NOTE — TELEPHONE ENCOUNTER
Call was returned to patient in regards to his oxygen concentrator. Patient states his oxygen tank isn't working. I informed patient to reach out to his [employer] company. I advised patient if he feels like he's in respiratory distress, he should go to the nearest ED or urgent care. Patient verbalized understanding.

## 2025-03-19 ENCOUNTER — PATIENT OUTREACH (OUTPATIENT)
Dept: ADMINISTRATIVE | Facility: CLINIC | Age: 75
End: 2025-03-19
Payer: MEDICARE

## 2025-03-19 NOTE — PROGRESS NOTES
C3 nurse attempted to contact Emmanuel Grant for a TCC post hospital discharge follow up call. No answer. No voicemail available. The patient has a scheduled HOSFU appointment with Wilfredo De Souza MD on 03/26/25 @ 1300.

## 2025-03-20 ENCOUNTER — TELEPHONE (OUTPATIENT)
Dept: FAMILY MEDICINE | Facility: CLINIC | Age: 75
End: 2025-03-20
Payer: MEDICARE

## 2025-03-20 ENCOUNTER — TELEPHONE (OUTPATIENT)
Dept: TRANSPLANT | Facility: CLINIC | Age: 75
End: 2025-03-20
Payer: MEDICARE

## 2025-03-20 ENCOUNTER — TELEPHONE (OUTPATIENT)
Dept: PULMONOLOGY | Facility: CLINIC | Age: 75
End: 2025-03-20
Payer: MEDICARE

## 2025-03-20 NOTE — TELEPHONE ENCOUNTER
I spoke with patient in regards to his oxygen concentrator. Patient states he contact [facility] and was told they went out to his house yesterday. Patient states no one came to his house. I informed patient that I'll reach out to his [employer] company. I advised patient if he's in any respiratory distress he should go to the nearest ER or Urgent Care. Patient verbalized understanding.    I contact [facility]. I spoke with (Ms. Lisy) that states they take pictures when going out to a patient home. She states that someone will go out to his home today between 11a-5p.     I attempted to call patient back to inform him that I spoke to his [employer] company. No answer. Patient voicemail is full. Will try again later.

## 2025-03-20 NOTE — PROGRESS NOTES
C3 nurse spoke with Emmanuel Grant  for a TCC post hospital discharge follow up call. The patient has a scheduled HOSFU appointment (see below).

## 2025-03-20 NOTE — TELEPHONE ENCOUNTER
"----- Message from KANDI Valencia sent at 3/20/2025 10:24 AM CDT -----  Regarding: tcc post-d/c call  I am an RN with the transition of care team. A TCC outreach call was completed on today. Can your team please contact patient regardingPt requesting sooner HOSPFU apptPt confirms having current supplemental oxygen source, waiting on delivery of portable O2. Pt denies pulse oximeter to check status. Pt voiced oxygen is ineffectively managing [place], increases to 4L without relief. Denies SOB at rest.Pt c/o ongoing throat itching, denies r/t oxygen use. Requesting referral to "throat doctor"Please do not reply to this message, as this inbox is not routinely monitored.  "

## 2025-03-20 NOTE — TELEPHONE ENCOUNTER
Attempt to contact [facility]. No answer. Left. Voice message.         ----- Message from Mandy sent at 3/20/2025  3:14 PM CDT -----  Type: Patient Call Back Who called:Self What is the request in detail:pt stated meds making his stomach feel uncomfortable. Pt requested to speak with nurse  Can the clinic reply by [facility]? No Would the patient rather a call back or a response via [facility]? Call back Best call back number:.[phone]  Additional Information: Thank you.

## 2025-03-20 NOTE — TELEPHONE ENCOUNTER
I spoke with patient in regards to his oxygen concentrator. I informed patient that I spoke with [facility]. I also, informed him that he'll have to answer his phone in order for someone to delivery his oxygen. I let patient know that someone will come between 11 am and 5 pm today to delivery his oxygen. Patient verbalized understanding.

## 2025-03-20 NOTE — LETTER
March 20, 2025    Naheed Schmitt MD  [address] [place] [id number]  Phone: [phone]  Fax: [phone]      Dear Naheed Schmitt:    Patient: Emmanuel Grant   MR Number: [id number]   YOB: 1950     Thank you for the referral of Emmanuel Grant to our lung transplant program. Upon reviewing the information provided by your office, we find that Emmanuel Grant is not a potential candidate for lung transplantation at [facility] due to the following:      Patient's choice. Mr. Grant did not want to schedule an appointment.      Once again, we appreciate your referral to our center and we look forward to working with you in the future. If you have any questions or concerns regarding this decision, then please do not hesitate to contact me at [phone].    Sincerely,       Karen Sharif D.O.  Medical Director, Lung Transplant  Pulmonary & Critical Care Medicine    [facility]  [address] [place] [address] [phone]

## 2025-03-20 NOTE — TELEPHONE ENCOUNTER
Contact [facility] for a sooner appointment. Offered a sooner appointment with another provider and patient declined.

## 2025-03-20 NOTE — TELEPHONE ENCOUNTER
"Contacted patient. Notified him of the referral to our department. Patient stated, "I am not a candidate for lung transplant. I'm too old and I'm too sick. The surgeons told me that I am not a candidate. I think I would die on the table." Informed patient that we could schedule an appointment to discuss further. Patient again reiterated that he is not a candidate. Patient did not want to schedule an appointment. Instructed patient to contact us should he change his mind about the appointment. He verbalized his understanding.    Letter sent to referring provider.  "

## 2025-03-21 ENCOUNTER — OFFICE VISIT (OUTPATIENT)
Dept: URGENT CARE | Facility: CLINIC | Age: 75
End: 2025-03-21
Payer: MEDICARE

## 2025-03-21 VITALS
DIASTOLIC BLOOD PRESSURE: 93 MMHG | WEIGHT: 128.75 LBS | TEMPERATURE: 99 F | SYSTOLIC BLOOD PRESSURE: 134 MMHG | BODY MASS INDEX: 19.51 KG/M2 | HEIGHT: 68 IN | OXYGEN SATURATION: 96 % | RESPIRATION RATE: 14 BRPM | HEART RATE: 90 BPM

## 2025-03-21 DIAGNOSIS — K21.9 GASTROESOPHAGEAL REFLUX DISEASE WITHOUT ESOPHAGITIS: Primary | ICD-10-CM

## 2025-03-21 RX ORDER — ALUMINUM HYDROXIDE, MAGNESIUM HYDROXIDE, AND SIMETHICONE 1200; 120; 1200 MG/30ML; MG/30ML; MG/30ML
30 SUSPENSION ORAL
Status: COMPLETED | OUTPATIENT
Start: 2025-03-21 | End: 2025-03-21

## 2025-03-21 RX ORDER — LIDOCAINE HYDROCHLORIDE 20 MG/ML
10 SOLUTION OROPHARYNGEAL
Status: COMPLETED | OUTPATIENT
Start: 2025-03-21 | End: 2025-03-21

## 2025-03-21 RX ADMIN — ALUMINUM HYDROXIDE, MAGNESIUM HYDROXIDE, AND SIMETHICONE 30 ML: 1200; 120; 1200 SUSPENSION ORAL at 09:03

## 2025-03-21 RX ADMIN — LIDOCAINE HYDROCHLORIDE 10 ML: 20 SOLUTION OROPHARYNGEAL at 09:03

## 2025-03-21 NOTE — PROGRESS NOTES
"Subjective:      Patient ID: Emmanuel Grant is a 74 y.o. male.    Vitals:  height is 5' 8" (1.727 m) and weight is 58.4 kg (128 lb 12 oz). His oral temperature is 98.6 °F (37 °C). His blood pressure is 134/93 (abnormal) and his pulse is 90. His respiration is 14 and oxygen saturation is 96%.     Chief Complaint: Abdominal Pain    Patient is a 74 year old male with HTN, CAD, DMT2, ILD on 2 L of home O2 who presents today for abdominal pain for 1 month. Patient took marito-bismol. States he feels a burning pain that goes up his throat sometimes and causes him to cough. Was just in the ED last week for chest pain, but that was different and felt more like a heaviness/pressure. He states this pain is different. He did not tell them in the ED about this burning pain. He states that he takes a tiny acid reflux pill at night which helps him sleep, but then acid reflux is back in the morning and throughout the day. He is unsure if he is taking omeprazole or pepcid as both are on his med list. He denies any radiation of the pain. Denies any worsening of SOB.     Abdominal Pain  This is a new problem. The current episode started more than 1 month ago. The onset quality is undetermined. The problem occurs rarely. The problem has been unchanged. The pain is at a severity of 3/10. The pain is mild. The quality of the pain is aching. The abdominal pain does not radiate. Pertinent negatives include no constipation, diarrhea, nausea or vomiting. Nothing aggravates the pain. The pain is relieved by Nothing. The treatment provided mild relief.       Gastrointestinal:  Positive for abdominal pain and heartburn. Negative for abdominal bloating, nausea, vomiting, constipation and diarrhea.      Objective:     Physical Exam   Constitutional: He is oriented to person, place, and time. He appears well-developed. He is cooperative.  Non-toxic appearance. He does not appear ill. No distress.      Comments:Patient sits comfortably in exam " chair. Answers questions in complete sentences. Does not show any signs of distress or discoloration. Patient with nasal canula in place.        HENT:   Head: Normocephalic and atraumatic.   Ears:   Right Ear: Hearing, tympanic membrane, external ear and ear canal normal. no impacted cerumen  Left Ear: Hearing, tympanic membrane, external ear and ear canal normal. no impacted cerumen  Nose: Nose normal. No mucosal edema, rhinorrhea, nasal deformity or congestion. No epistaxis. Right sinus exhibits no maxillary sinus tenderness and no frontal sinus tenderness. Left sinus exhibits no maxillary sinus tenderness and no frontal sinus tenderness.   Mouth/Throat: Uvula is midline, oropharynx is clear and moist and mucous membranes are normal. No trismus in the jaw. Normal dentition. No uvula swelling. No oropharyngeal exudate, posterior oropharyngeal edema or posterior oropharyngeal erythema.   Eyes: Conjunctivae and lids are normal. No scleral icterus.   Neck: Trachea normal and phonation normal. Neck supple. No edema present. No erythema present. No neck rigidity present.   Cardiovascular: Normal rate, regular rhythm, normal heart sounds and normal pulses.   Pulmonary/Chest: Effort normal and breath sounds normal. No stridor. No respiratory distress. He has no decreased breath sounds. He has no wheezes. He has no rhonchi. He has no rales. He exhibits no tenderness.   Abdominal: Normal appearance. There is abdominal tenderness in the epigastric area. There is no rebound, no guarding, no tenderness at McBurney's point, negative Joy's sign and negative Rovsing's sign.      Comments: Minimal TTP over the epigastric region with deep palpation. No rebound or guarding. No signs of acute abdomen.    Musculoskeletal: Normal range of motion.         General: No deformity. Normal range of motion.   Neurological: He is alert and oriented to person, place, and time. He exhibits normal muscle tone. Coordination normal.   Skin: Skin  is warm, dry, intact, not diaphoretic and not pale.   Psychiatric: His speech is normal and behavior is normal. Judgment and thought content normal.   Nursing note and vitals reviewed.      Assessment:     1. Gastroesophageal reflux disease without esophagitis        Plan:   3/10 epigastric pain before GI cocktail. 0/10 pain after GI cocktail. Patient unsure which medications he is taking for acid reflux. Med list shows omeprazole and pepcid. States he takes a small acid reflux pill at night. Explained the appropriate way of taking omeprazole in the morning on an empty stomach. Although, he may be likely talking about pepcid, since he has an allergy to pantoprazole. Advise to continue pepcid, but okay to take twice a day instead of once a day. Attachment given with foods to avoid with acid reflux. F/u with PCP for recurrent or worsening symptoms. Patient expressed understanding and agrees with plan.     Gastroesophageal reflux disease without esophagitis  -     LIDOcaine viscous HCl 2% oral solution 10 mL  -     aluminum-magnesium hydroxide-simethicone 200-200-20 mg/5 mL suspension 30 mL                Patient Instructions   - Take omeprazole in the morning on an empty stomach. Wait at least 30 minutes- 1 hour before eating or drinking anything.   - Take pepcid 2 times per day.   - Read attachments on foods and drinks to avoid with acid reflux.     - You must understand that you have received an Urgent Care treatment only and that you may be released before all of your medical problems are known or treated.   - You, the patient, will arrange for follow up care as instructed.   - If your condition worsens or fails to improve we recommend that you receive another evaluation at the ER immediately or contact your PCP to discuss your concerns or return here.   - Follow up with your PCP or specialty clinic as directed in the next 1-2 weeks if not improved or as needed.  You can call [phone] to schedule an  appointment with the appropriate provider.    If your symptoms do not improve or worsen, go to the emergency room immediately.

## 2025-03-21 NOTE — PATIENT INSTRUCTIONS
- Take omeprazole in the morning on an empty stomach. Wait at least 30 minutes- 1 hour before eating or drinking anything.   - Take pepcid 2 times per day.   - Read attachments on foods and drinks to avoid with acid reflux.     - You must understand that you have received an Urgent Care treatment only and that you may be released before all of your medical problems are known or treated.   - You, the patient, will arrange for follow up care as instructed.   - If your condition worsens or fails to improve we recommend that you receive another evaluation at the ER immediately or contact your PCP to discuss your concerns or return here.   - Follow up with your PCP or specialty clinic as directed in the next 1-2 weeks if not improved or as needed.  You can call [phone] to schedule an appointment with the appropriate provider.    If your symptoms do not improve or worsen, go to the emergency room immediately.

## 2025-03-22 ENCOUNTER — HOSPITAL ENCOUNTER (EMERGENCY)
Facility: HOSPITAL | Age: 75
Discharge: HOME | End: 2025-03-22
Attending: EMERGENCY MEDICINE
Payer: MEDICARE

## 2025-03-22 VITALS
RESPIRATION RATE: 19 BRPM | DIASTOLIC BLOOD PRESSURE: 80 MMHG | TEMPERATURE: 98 F | HEART RATE: 90 BPM | OXYGEN SATURATION: 100 % | SYSTOLIC BLOOD PRESSURE: 140 MMHG

## 2025-03-22 DIAGNOSIS — E87.6 HYPOKALEMIA: Primary | ICD-10-CM

## 2025-03-22 DIAGNOSIS — R03.0 ELEVATED BLOOD PRESSURE READING: ICD-10-CM

## 2025-03-22 DIAGNOSIS — R06.02 SOB (SHORTNESS OF BREATH): ICD-10-CM

## 2025-03-22 LAB
ABSOLUTE EOSINOPHIL (OHS): 0.34 K/UL
ABSOLUTE MONOCYTE (OHS): 0.57 K/UL (ref 0.3–1)
ABSOLUTE NEUTROPHIL COUNT (OHS): 4.38 K/UL (ref 1.8–7.7)
ALBUMIN SERPL BCP-MCNC: 2.7 G/DL (ref 3.5–5.2)
ALP SERPL-CCNC: 75 UNIT/L (ref 40–150)
ALT SERPL W/O P-5'-P-CCNC: 38 UNIT/L (ref 10–44)
ANION GAP (OHS): 10 MMOL/L (ref 8–16)
AST SERPL-CCNC: 23 UNIT/L (ref 11–45)
BASOPHILS # BLD AUTO: 0.03 K/UL
BASOPHILS NFR BLD AUTO: 0.4 %
BILIRUB SERPL-MCNC: 0.3 MG/DL (ref 0.1–1)
BIPAP: 0
BUN SERPL-MCNC: 15 MG/DL (ref 8–23)
CALCIUM SERPL-MCNC: 8.6 MG/DL (ref 8.7–10.5)
CHLORIDE SERPL-SCNC: 110 MMOL/L (ref 95–110)
CO2 SERPL-SCNC: 23 MMOL/L (ref 23–29)
CORRECTED TEMPERATURE (PCO2): 47.3 MMHG
CORRECTED TEMPERATURE (PH): 7.4
CORRECTED TEMPERATURE (PO2): 21.2 MMHG
CREAT SERPL-MCNC: 1 MG/DL (ref 0.5–1.4)
ERYTHROCYTE [DISTWIDTH] IN BLOOD BY AUTOMATED COUNT: 14.3 % (ref 11.5–14.5)
FIO2: 21 %
GFR SERPLBLD CREATININE-BSD FMLA CKD-EPI: >60 ML/MIN/1.73/M2
GLUCOSE SERPL-MCNC: 111 MG/DL (ref 70–110)
HCT VFR BLD AUTO: 35.4 % (ref 40–54)
HCV AB SERPL QL IA: NORMAL
HGB BLD-MCNC: 11.4 GM/DL (ref 14–18)
HIV 1+2 AB+HIV1 P24 AG SERPL QL IA: NORMAL
IMM GRANULOCYTES # BLD AUTO: 0.02 K/UL (ref 0–0.04)
IMM GRANULOCYTES NFR BLD AUTO: 0.3 % (ref 0–0.5)
LYMPHOCYTES # BLD AUTO: 1.33 K/UL (ref 1–4.8)
MCH RBC QN AUTO: 26.8 PG (ref 27–50)
MCHC RBC AUTO-ENTMCNC: 32.2 G/DL (ref 32–36)
MCV RBC AUTO: 83 FL (ref 82–98)
NUCLEATED RBC (/100WBC) (OHS): 0 /100 WBC
PCO2 BLDA: 47.3 MMHG
PH SMN: 7.4 [PH]
PLATELET # BLD AUTO: 207 K/UL (ref 150–450)
PMV BLD AUTO: 9.4 FL (ref 9.2–12.9)
PO2 BLDA: 21.2 MMHG
POC BASE DEFICIT: 3.9 MMOL/L
POC HCO3: 26.4 MMOL/L
POC PERFORMED BY: NORMAL
POC TEMPERATURE: 37 C
POTASSIUM SERPL-SCNC: 3.4 MMOL/L (ref 3.5–5.1)
PROT SERPL-MCNC: 5.8 GM/DL (ref 6–8.4)
RBC # BLD AUTO: 4.26 M/UL (ref 4.6–6.2)
RELATIVE EOSINOPHIL (OHS): 5.1 %
RELATIVE LYMPHOCYTE (OHS): 19.9 % (ref 18–48)
RELATIVE MONOCYTE (OHS): 8.5 % (ref 4–15)
RELATIVE NEUTROPHIL (OHS): 65.8 % (ref 38–73)
SODIUM SERPL-SCNC: 143 MMOL/L (ref 136–145)
SPECIMEN SOURCE: NORMAL
TROPONIN I SERPL HS-MCNC: 11 NG/L
TROPONIN I SERPL HS-MCNC: 9 NG/L
WBC # BLD AUTO: 6.67 K/UL (ref 3.9–12.7)

## 2025-03-22 PROCEDURE — 80053 COMPREHEN METABOLIC PANEL: CPT | Mod: HCNC | Performed by: PHYSICIAN ASSISTANT

## 2025-03-22 PROCEDURE — 87389 HIV-1 AG W/HIV-1&-2 AB AG IA: CPT | Mod: HCNC | Performed by: PHYSICIAN ASSISTANT

## 2025-03-22 PROCEDURE — 99900035 HC TECH TIME PER 15 MIN (STAT): Mod: HCNC

## 2025-03-22 PROCEDURE — 93010 ELECTROCARDIOGRAM REPORT: CPT | Mod: HCNC,,, | Performed by: INTERNAL MEDICINE

## 2025-03-22 PROCEDURE — 86803 HEPATITIS C AB TEST: CPT | Mod: HCNC | Performed by: PHYSICIAN ASSISTANT

## 2025-03-22 PROCEDURE — 83880 ASSAY OF NATRIURETIC PEPTIDE: CPT | Mod: HCNC | Performed by: PHYSICIAN ASSISTANT

## 2025-03-22 PROCEDURE — 93005 ELECTROCARDIOGRAM TRACING: CPT | Mod: HCNC

## 2025-03-22 PROCEDURE — 25000003 PHARM REV CODE 250: Mod: HCNC | Performed by: PHYSICIAN ASSISTANT

## 2025-03-22 PROCEDURE — 85025 COMPLETE CBC W/AUTO DIFF WBC: CPT | Mod: HCNC | Performed by: PHYSICIAN ASSISTANT

## 2025-03-22 PROCEDURE — 82962 GLUCOSE BLOOD TEST: CPT | Mod: HCNC

## 2025-03-22 PROCEDURE — 84484 ASSAY OF TROPONIN QUANT: CPT | Mod: HCNC | Performed by: PHYSICIAN ASSISTANT

## 2025-03-22 PROCEDURE — 82803 BLOOD GASES ANY COMBINATION: CPT | Mod: HCNC

## 2025-03-22 PROCEDURE — 99285 EMERGENCY DEPT VISIT HI MDM: CPT | Mod: 25,HCNC

## 2025-03-22 RX ORDER — POTASSIUM CHLORIDE 20 MEQ/1
40 TABLET, EXTENDED RELEASE ORAL ONCE
Status: COMPLETED | OUTPATIENT
Start: 2025-03-22 | End: 2025-03-22

## 2025-03-22 RX ORDER — FAMOTIDINE 20 MG/1
20 TABLET, FILM COATED ORAL
Status: COMPLETED | OUTPATIENT
Start: 2025-03-22 | End: 2025-03-22

## 2025-03-22 RX ADMIN — FAMOTIDINE 20 MG: 20 TABLET, FILM COATED ORAL at 05:03

## 2025-03-22 RX ADMIN — SACUBITRIL AND VALSARTAN 1 TABLET: 24; 26 TABLET, FILM COATED ORAL at 08:03

## 2025-03-22 RX ADMIN — POTASSIUM CHLORIDE 40 MEQ: 1500 TABLET, EXTENDED RELEASE ORAL at 04:03

## 2025-03-22 NOTE — ED PROVIDER NOTES
"Encounter Date: 3/22/2025       History     Chief Complaint   Patient presents with    Shortness of Breath     [place] gas station, complaint of SOB with anxiety     The patient is a 74 year old male. He has a past medical history of HTN, DM, CAD with stent, CHF, CVA, ILD, and hypoxic respiratory failure on home oxygen. He presents to the ER stating that his portable oxygen machine is not working. He states that he just received the portable oxygen machine because his previous portable machine was also not working. He states that he has home O2 that is functioning appropriately, but that when he leaves the house, he needs a portable machine. He states that he was at a gas station just prior to arrival when he noticed that his portable machine was not working properly. He is here requesting to have his machine fixed or replaced. He states that he is SOB when not on oxygen. He denies any chest pain. He denies any acute coughing, wheezing, or chest tightness. No fevers or chills. No acute complaints otherwise.      Review of patient's allergies indicates:   Allergen Reactions    Dapagliflozin      Other reaction(s): Other (See Comments)    Pcn [penicillins]     Linagliptin Other (See Comments)     "it knocked me down", "it almost killed me"    Lisinopril Other (See Comments)     cough    Pantoprazole Hives     Past Medical History:   Diagnosis Date    Acute hypoxic respiratory failure 09/23/2024    Oxygen therapy 24/7 now, continue 2 L oxygen therapy.      BPH (benign prostatic hyperplasia) 02/28/2024    CAD (coronary artery disease)     Sees [facility], h/o stent    Chronic HFrEF (heart failure with reduced ejection fraction) 05/01/2024    Diabetes mellitus     Hypertension     Kidney stone     Stroke     age 62    Type 2 diabetes mellitus without complication, without long-term current use of insulin 10/08/2021     Past Surgical History:   Procedure Laterality Date    24 HOUR IMPEDANCE PH " MONITORING OF ESOPHAGUS IN PATIENT NOT TAKING ACID REDUCING MEDICATIONS N/A 11/13/2024    Procedure: IMPEDANCE PH STUDY, ESOPHAGEAL, 24 HOUR, IN PATIENT NOT TAKING ACID REDUCING MEDICATION;  Surgeon: Stephany Mendoza MD;  Location: [facility] (4TH FLR);  Service: Endoscopy;  Laterality: N/A;  referral dr miguel/ off ppi / prep inst sent to pt via mail  11/6- precall attempted no answer. Unable to [facility]-  11/8 - pt not called, per chart review patient currently inpatient at [place] [id number]    CARDIAC CATHETERIZATION      with stent    COLONOSCOPY N/A 03/27/2024    Procedure: COLONOSCOPY;  Surgeon: Ariela Castro MD;  Location: [facility] ENDO;  Service: Endoscopy;  Laterality: N/A;    ESOPHAGEAL MANOMETRY WITH MEASUREMENT OF IMPEDANCE N/A 11/13/2024    Procedure: MANOMETRY, ESOPHAGUS, WITH IMPEDANCE MEASUREMENT;  Surgeon: Stephany Mendoza MD;  Location: [facility] ENDO (4TH FLR);  Service: Endoscopy;  Laterality: N/A;    ESOPHAGOGASTRODUODENOSCOPY N/A 03/27/2024    Procedure: EGD (ESOPHAGOGASTRODUODENOSCOPY);  Surgeon: Ariela Castro MD;  Location: [facility] ENDO;  Service: Endoscopy;  Laterality: N/A;    LEFT HEART CATHETERIZATION Left 9/10/2024    Procedure: Left heart cath;  Surgeon: Jemal Drummond MD;  Location: [facility] CATH LAB;  Service: Cardiology;  Laterality: Left;    SHOULDER SURGERY Right      Family History   Problem Relation Name Age of Onset    Cancer Mother      Colon cancer Sister      Esophageal cancer Neg Hx       Social History[1]  Review of Systems   Constitutional:  Negative for chills, diaphoresis and fever.   HENT:  Negative for congestion and sore throat.    Eyes:  Negative for visual disturbance.   Respiratory:  Positive for shortness of breath. Negative for apnea, cough, choking, chest tightness and wheezing.    Cardiovascular:  Negative for chest pain, palpitations and leg swelling.   Gastrointestinal:  Negative for abdominal pain, diarrhea, nausea and vomiting.   Genitourinary:   Negative for difficulty urinating and dysuria.   Musculoskeletal:  Negative for back pain and neck pain.   Skin:  Negative for rash.   Allergic/Immunologic: Negative for immunocompromised state.   Neurological:  Negative for syncope, weakness, numbness and headaches.   Psychiatric/Behavioral:  Negative for confusion.        Physical Exam     Initial Vitals [03/22/25 1308]   BP Pulse Resp Temp SpO2   120/78 (!) 120 20 97.9 °F (36.6 °C) 98 %      MAP       --         Physical Exam    Nursing note and vitals reviewed.  Constitutional: He appears well-developed and well-nourished. He is not diaphoretic. No distress.   HENT:   Head: Normocephalic.   Eyes: Conjunctivae are normal.   Neck: Neck supple.   Cardiovascular:  Normal rate and intact distal pulses.           Pulmonary/Chest: No respiratory distress. He has no wheezes.   Abdominal: Abdomen is soft. He exhibits no distension. There is no abdominal tenderness. There is no rebound.   Musculoskeletal:         General: No tenderness or edema. Normal range of motion.      Cervical back: Neck supple.     Neurological: He is alert and oriented to person, place, and time. He has normal strength. No sensory deficit.   Skin: Skin is warm and dry.   Psychiatric: He has a normal mood and affect. His behavior is normal.       ED Course   Procedures  Labs Reviewed   COMPREHENSIVE METABOLIC PANEL - Abnormal       Result Value    Sodium 143      Potassium 3.4 (*)     Chloride 110      CO2 23      Glucose 111 (*)     BUN 15      Creatinine 1.0      Calcium 8.6 (*)     Protein Total 5.8 (*)     Albumin 2.7 (*)     Bilirubin Total 0.3      ALP 75      AST 23      ALT 38      Anion Gap 10      eGFR >60     CBC WITH DIFFERENTIAL - Abnormal    WBC 6.67      RBC 4.26 (*)     HGB 11.4 (*)     HCT 35.4 (*)     MCV 83      MCH 26.8 (*)     MCHC 32.2      RDW 14.3      Platelet Count 207      MPV 9.4      Nucleated RBC 0      Neut % 65.8      Lymph % 19.9      Mono % 8.5      Eos % 5.1       Basophil % 0.4      Imm Grans % 0.3      Neut # 4.38      Lymph # 1.33      Mono # 0.57      Eos # 0.34      Baso # 0.03      Imm Grans # 0.02     HEPATITIS C ANTIBODY - Normal    Hep C Ab Interp Non-Reactive     HIV 1 / 2 ANTIBODY - Normal    HIV 1/2 Ag/Ab Non-Reactive     TROPONIN I HIGH SENSITIVITY - Normal    Troponin High Sensitive 9     TROPONIN I HIGH SENSITIVITY - Normal    Troponin High Sensitive 11     CBC W/ AUTO DIFFERENTIAL    Narrative:     The following orders were created for panel order CBC auto differential.  Procedure                               Abnormality         Status                     ---------                               -----------         ------                     CBC with Differential[[id number]]       Abnormal            Final result                 Please view results for these tests on the individual orders.   NT-PRO NATRIURETIC PEPTIDE          Imaging Results              X-Ray Chest AP Portable (Final result)  Result time 03/22/25 16:28:01      Final result by Johnnie Cool MD (03/22/25 16:28:01)                   Impression:      As above.      Electronically signed by: Johnnie Cool MD  Date:    03/22/2025  Time:    16:28               Narrative:    EXAMINATION:  XR CHEST AP PORTABLE    CLINICAL HISTORY:  Shortness of breath    TECHNIQUE:  Single frontal view of the chest was performed.    COMPARISON:  Chest radiograph 03/16/2025    FINDINGS:  Patient is somewhat rotated.    Trachea is midline and patent.  Cardiomediastinal silhouette is midline with similar calcification and mild tortuosity of the aorta.  Heart is not significantly enlarged.  Hilar contours are unchanged.    Similar mild nonspecific elevation of the right hemidiaphragm.  Biapical pleuroparenchymal scarring similar to prior.  Scattered linear opacities throughout the lungs consistent with platelike scarring versus atelectasis similar to prior.  Grossly similar distribution of bilateral chronic  interstitial coarsening with peripheral reticulation compatible with chronic interstitial lung disease better demonstrated on previous cross-sectional imaging.  Superimposed interstitial type pneumonia or pulmonary edema not excluded.  No consolidation or sizable pleural effusion.  No pneumothorax.    No acute osseous process seen.  PA and lateral views can be obtained.                                       Medications   potassium chloride SA CR tablet 40 mEq (40 mEq Oral Given 3/22/25 1606)   famotidine tablet 20 mg (20 mg Oral Given 3/22/25 1716)     Medical Decision Making  Amount and/or Complexity of Data Reviewed  Labs: ordered.  Radiology: ordered.    Risk  Prescription drug management.      Additional MDM:     EKG: I have independently interpreted EKG(s) - see notes., EKG - Independent Interpretation - Normal sinus rhythm, normal rate, no acute changes.     X-Rays: I have independently interpreted X-Ray(s) - see notes., Chest X-Ray - Independent Interpretation - Heart size normal, Lungs clear, No acute abnormality.                      Medical Decision Making:   History:   Old Medical Records: I decided to obtain old medical records.  Initial Assessment:   73 yo Male, hx of ILD and hypoxemic respiratory failure on supplemental Oxygen presents to the ER stating that the portable oxygen tank that he was just given is not working and is requesting for it to be fixed or replaced. He has home Oxygen, but portable tank reportedly not working and he became SOB PTA, improved once on O2 here in ED. No additional acute complaints.   Differential Diagnosis:   ILD, Chronic hypoxia, Acute process, mechanical O2 machine malfunction, etc    Clinical Tests:   Lab Tests: Ordered and Reviewed  Radiological Study: Ordered and Reviewed  Medical Tests: Ordered and Reviewed  ED Management:  Vital signs reviewed   Chart review completed   EKG completed - no acute ischemic changes   Chest x ray completed - no acute findings   Labs  reviewed - mild hypokalemia, PO supplement given, otherwise unremarkable  Case discussed with the ER attending physician - recommends  consult to arrange O2 technician to evaluate portable machine    evaluated patient, spoke to rep and technician will be in ED in the next 1-2 hours to troubleshoot or replace - pt updated   Pt now requesting meal tray and his regular dose of PO Pepcid   O2 technician arrived to ED and patient has a working portable O2 tank to go home with. Pt advised to follow up with his regular physician this week. Return precautions advised.   Other:   I have discussed this case with another health care provider.             Clinical Impression:  Final diagnoses:  [R06.02] SOB (shortness of breath)  [E87.6] Hypokalemia (Primary)  [R03.0] Elevated blood pressure reading          ED Disposition Condition    Discharge Stable          ED Prescriptions    None       Follow-up Information    None                [1]  Social History  Tobacco Use    Smoking status: Never     Passive exposure: Never    Smokeless tobacco: Never   Substance Use Topics    Alcohol use: No    Drug use: Never      Dany León PA-C  03/22/25 2049

## 2025-03-22 NOTE — ED NOTES
"Patient identifiers for Emmanuel Grant 74 y.o. male checked and correct.  Chief Complaint   Patient presents with    Shortness of Breath     Pt [place] from gas station, complaint of SOB with anxiety     Past Medical History:   Diagnosis Date    Acute hypoxic respiratory failure 09/23/2024    Oxygen therapy 24/7 now, continue 2 L oxygen therapy.      BPH (benign prostatic hyperplasia) 02/28/2024    CAD (coronary artery disease)     Sees [facility], h/o stent    Chronic HFrEF (heart failure with reduced ejection fraction) 05/01/2024    Diabetes mellitus     Hypertension     Kidney stone     Stroke     age 62    Type 2 diabetes mellitus without complication, without long-term current use of insulin 10/08/2021     Allergies reported:   Review of patient's allergies indicates:   Allergen Reactions    Dapagliflozin      Other reaction(s): Other (See Comments)    Pcn [penicillins]     Linagliptin Other (See Comments)     "it knocked me down", "it almost killed me"    Lisinopril Other (See Comments)     cough    Pantoprazole Hives         LOC: Patient is awake, alert, and aware of environment with an appropriate affect. Patient is oriented x 4 and speaking appropriately.  APPEARANCE: Patient resting comfortably and in no acute distress. Patient is clean and well groomed, patient's clothing is properly fastened.  HEENT: WDL  SKIN: The skin is warm and dry. Patient has normal skin turgor and moist mucus membranes.   MUSCULOSKELETAL: Patient is moving all extremities well, no obvious deformities noted. Pulses intact.   RESPIRATORY: Airway is open and patent. Respirations are spontaneous and non-labored with normal effort and rate. Pt wearing 3L O2 via nasal canula   CARDIAC: Patient tachycardic, 112 on cardiac monitor. No peripheral edema noted. Denies chest pain and SOB at at time of assessment.   ABDOMEN: No distention noted. Soft and non-tender upon palpation.  NEUROLOGICAL: pupils 3mm, PERRL. Facial expression is " symmetrical. Hand grasps are equal bilaterally. Normal sensation in all extremities when touched with finger.

## 2025-03-23 ENCOUNTER — HOSPITAL ENCOUNTER (INPATIENT)
Facility: HOSPITAL | Age: 75
LOS: 1 days | Discharge: HOME HEALTH | DRG: 196 | End: 2025-03-25
Attending: EMERGENCY MEDICINE | Admitting: HOSPITALIST
Payer: MEDICARE

## 2025-03-23 DIAGNOSIS — E11.9 TYPE 2 DIABETES MELLITUS WITHOUT COMPLICATION, WITHOUT LONG-TERM CURRENT USE OF INSULIN: ICD-10-CM

## 2025-03-23 DIAGNOSIS — J96.11 CHRONIC HYPOXEMIC RESPIRATORY FAILURE: ICD-10-CM

## 2025-03-23 DIAGNOSIS — J84.112 IPF (IDIOPATHIC PULMONARY FIBROSIS): ICD-10-CM

## 2025-03-23 DIAGNOSIS — J84.9 INTERSTITIAL LUNG DISEASE: Primary | ICD-10-CM

## 2025-03-23 DIAGNOSIS — I10 ESSENTIAL HYPERTENSION: ICD-10-CM

## 2025-03-23 DIAGNOSIS — J96.21 ACUTE ON CHRONIC RESPIRATORY FAILURE WITH HYPOXIA: ICD-10-CM

## 2025-03-23 DIAGNOSIS — J84.9 INTERSTITIAL LUNG DISEASE: ICD-10-CM

## 2025-03-23 DIAGNOSIS — N40.0 BENIGN PROSTATIC HYPERPLASIA WITHOUT LOWER URINARY TRACT SYMPTOMS: ICD-10-CM

## 2025-03-23 DIAGNOSIS — I25.119 CORONARY ARTERY DISEASE INVOLVING NATIVE CORONARY ARTERY OF NATIVE HEART WITH ANGINA PECTORIS: ICD-10-CM

## 2025-03-23 DIAGNOSIS — R06.02 SOB (SHORTNESS OF BREATH): ICD-10-CM

## 2025-03-23 LAB
ABSOLUTE EOSINOPHIL (OHS): 0.41 K/UL
ABSOLUTE MONOCYTE (OHS): 0.67 K/UL (ref 0.3–1)
ABSOLUTE NEUTROPHIL COUNT (OHS): 4.18 K/UL (ref 1.8–7.7)
ALBUMIN SERPL BCP-MCNC: 3.7 G/DL (ref 3.5–5.2)
ALLENS TEST: ABNORMAL
ALP SERPL-CCNC: 99 UNIT/L (ref 40–150)
ALT SERPL W/O P-5'-P-CCNC: 45 UNIT/L (ref 10–44)
ANION GAP (OHS): 14 MMOL/L (ref 8–16)
AST SERPL-CCNC: 23 UNIT/L (ref 11–45)
BASOPHILS # BLD AUTO: 0.02 K/UL
BASOPHILS NFR BLD AUTO: 0.3 %
BILIRUB SERPL-MCNC: 0.6 MG/DL (ref 0.1–1)
BNP SERPL-MCNC: 258 PG/ML (ref 0–99)
BUN SERPL-MCNC: 13 MG/DL (ref 8–23)
CALCIUM SERPL-MCNC: 9.9 MG/DL (ref 8.7–10.5)
CHLORIDE SERPL-SCNC: 103 MMOL/L (ref 95–110)
CO2 SERPL-SCNC: 21 MMOL/L (ref 23–29)
CREAT SERPL-MCNC: 1.1 MG/DL (ref 0.5–1.4)
CTP QC/QA: YES
CTP QC/QA: YES
ERYTHROCYTE [DISTWIDTH] IN BLOOD BY AUTOMATED COUNT: 14.2 % (ref 11.5–14.5)
GFR SERPLBLD CREATININE-BSD FMLA CKD-EPI: >60 ML/MIN/1.73/M2
GLUCOSE SERPL-MCNC: 292 MG/DL (ref 70–110)
HCO3 UR-SCNC: 27.1 MMOL/L (ref 24–28)
HCT VFR BLD AUTO: 36.9 % (ref 40–54)
HGB BLD-MCNC: 12.6 GM/DL (ref 14–18)
IMM GRANULOCYTES # BLD AUTO: 0.02 K/UL (ref 0–0.04)
IMM GRANULOCYTES NFR BLD AUTO: 0.3 % (ref 0–0.5)
LYMPHOCYTES # BLD AUTO: 1.71 K/UL (ref 1–4.8)
MAGNESIUM SERPL-MCNC: 1.7 MG/DL (ref 1.6–2.6)
MCH RBC QN AUTO: 27.7 PG (ref 27–50)
MCHC RBC AUTO-ENTMCNC: 34.1 G/DL (ref 32–36)
MCV RBC AUTO: 81 FL (ref 82–98)
NUCLEATED RBC (/100WBC) (OHS): 0 /100 WBC
OHS QRS DURATION: 94 MS
OHS QTC CALCULATION: 450 MS
PCO2 BLDA: 54.5 MMHG (ref 35–45)
PH SMN: 7.3 [PH] (ref 7.35–7.45)
PLATELET # BLD AUTO: 228 K/UL (ref 150–450)
PMV BLD AUTO: 9.3 FL (ref 9.2–12.9)
PO2 BLDA: 17 MMHG (ref 40–60)
POC BE: 0 MMOL/L
POC MOLECULAR INFLUENZA A AGN: NEGATIVE
POC MOLECULAR INFLUENZA B AGN: NEGATIVE
POC SATURATED O2: 19 % (ref 95–100)
POC TCO2: 29 MMOL/L (ref 24–29)
POCT GLUCOSE: 242 MG/DL (ref 70–110)
POCT GLUCOSE: 336 MG/DL (ref 70–110)
POTASSIUM SERPL-SCNC: 4.4 MMOL/L (ref 3.5–5.1)
PROT SERPL-MCNC: 8.4 GM/DL (ref 6–8.4)
RBC # BLD AUTO: 4.55 M/UL (ref 4.6–6.2)
RELATIVE EOSINOPHIL (OHS): 5.8 %
RELATIVE LYMPHOCYTE (OHS): 24.4 % (ref 18–48)
RELATIVE MONOCYTE (OHS): 9.6 % (ref 4–15)
RELATIVE NEUTROPHIL (OHS): 59.6 % (ref 38–73)
RSV AG SPEC QL IA: NEGATIVE
SAMPLE: ABNORMAL
SARS-COV-2 RDRP RESP QL NAA+PROBE: NEGATIVE
SITE: ABNORMAL
SODIUM SERPL-SCNC: 138 MMOL/L (ref 136–145)
TROPONIN I SERPL DL<=0.01 NG/ML-MCNC: 0.01 NG/ML
WBC # BLD AUTO: 7.01 K/UL (ref 3.9–12.7)

## 2025-03-23 PROCEDURE — 80053 COMPREHEN METABOLIC PANEL: CPT | Mod: HCNC | Performed by: EMERGENCY MEDICINE

## 2025-03-23 PROCEDURE — 63600175 PHARM REV CODE 636 W HCPCS: Mod: HCNC | Performed by: EMERGENCY MEDICINE

## 2025-03-23 PROCEDURE — 96375 TX/PRO/DX INJ NEW DRUG ADDON: CPT | Mod: HCNC

## 2025-03-23 PROCEDURE — 93010 ELECTROCARDIOGRAM REPORT: CPT | Mod: HCNC,,, | Performed by: INTERNAL MEDICINE

## 2025-03-23 PROCEDURE — 99900035 HC TECH TIME PER 15 MIN (STAT): Mod: HCNC

## 2025-03-23 PROCEDURE — 94761 N-INVAS EAR/PLS OXIMETRY MLT: CPT | Mod: HCNC,XB

## 2025-03-23 PROCEDURE — 25000242 PHARM REV CODE 250 ALT 637 W/ HCPCS: Mod: HCNC | Performed by: NURSE PRACTITIONER

## 2025-03-23 PROCEDURE — 94640 AIRWAY INHALATION TREATMENT: CPT | Mod: HCNC

## 2025-03-23 PROCEDURE — 25000003 PHARM REV CODE 250: Mod: HCNC | Performed by: NURSE PRACTITIONER

## 2025-03-23 PROCEDURE — 27000221 HC OXYGEN, UP TO 24 HOURS: Mod: HCNC

## 2025-03-23 PROCEDURE — 94799 UNLISTED PULMONARY SVC/PX: CPT | Mod: HCNC

## 2025-03-23 PROCEDURE — 25000003 PHARM REV CODE 250: Mod: HCNC | Performed by: EMERGENCY MEDICINE

## 2025-03-23 PROCEDURE — 93005 ELECTROCARDIOGRAM TRACING: CPT | Mod: HCNC

## 2025-03-23 PROCEDURE — 87634 RSV DNA/RNA AMP PROBE: CPT | Mod: HCNC | Performed by: EMERGENCY MEDICINE

## 2025-03-23 PROCEDURE — 99291 CRITICAL CARE FIRST HOUR: CPT | Mod: HCNC

## 2025-03-23 PROCEDURE — G0378 HOSPITAL OBSERVATION PER HR: HCPCS | Mod: HCNC

## 2025-03-23 PROCEDURE — 96376 TX/PRO/DX INJ SAME DRUG ADON: CPT

## 2025-03-23 PROCEDURE — 25000003 PHARM REV CODE 250: Mod: HCNC | Performed by: HOSPITALIST

## 2025-03-23 PROCEDURE — 85025 COMPLETE CBC W/AUTO DIFF WBC: CPT | Mod: HCNC | Performed by: EMERGENCY MEDICINE

## 2025-03-23 PROCEDURE — 83880 ASSAY OF NATRIURETIC PEPTIDE: CPT | Mod: HCNC | Performed by: EMERGENCY MEDICINE

## 2025-03-23 PROCEDURE — 84484 ASSAY OF TROPONIN QUANT: CPT | Mod: HCNC | Performed by: EMERGENCY MEDICINE

## 2025-03-23 PROCEDURE — 96374 THER/PROPH/DIAG INJ IV PUSH: CPT | Mod: HCNC

## 2025-03-23 PROCEDURE — 99900031 HC PATIENT EDUCATION (STAT): Mod: HCNC

## 2025-03-23 PROCEDURE — 87635 SARS-COV-2 COVID-19 AMP PRB: CPT | Mod: HCNC | Performed by: EMERGENCY MEDICINE

## 2025-03-23 PROCEDURE — 82962 GLUCOSE BLOOD TEST: CPT | Mod: HCNC

## 2025-03-23 PROCEDURE — 87502 INFLUENZA DNA AMP PROBE: CPT | Mod: HCNC

## 2025-03-23 PROCEDURE — 63600175 PHARM REV CODE 636 W HCPCS: Mod: HCNC | Performed by: NURSE PRACTITIONER

## 2025-03-23 PROCEDURE — 96372 THER/PROPH/DIAG INJ SC/IM: CPT | Performed by: NURSE PRACTITIONER

## 2025-03-23 PROCEDURE — 83735 ASSAY OF MAGNESIUM: CPT | Mod: HCNC | Performed by: EMERGENCY MEDICINE

## 2025-03-23 PROCEDURE — 82803 BLOOD GASES ANY COMBINATION: CPT | Mod: HCNC

## 2025-03-23 RX ORDER — IBUPROFEN 200 MG
24 TABLET ORAL
Status: DISCONTINUED | OUTPATIENT
Start: 2025-03-23 | End: 2025-03-25 | Stop reason: HOSPADM

## 2025-03-23 RX ORDER — POLYETHYLENE GLYCOL 3350 17 G/17G
17 POWDER, FOR SOLUTION ORAL 2 TIMES DAILY PRN
Status: DISCONTINUED | OUTPATIENT
Start: 2025-03-23 | End: 2025-03-25 | Stop reason: HOSPADM

## 2025-03-23 RX ORDER — FAMOTIDINE 10 MG/ML
20 INJECTION, SOLUTION INTRAVENOUS
Status: COMPLETED | OUTPATIENT
Start: 2025-03-23 | End: 2025-03-23

## 2025-03-23 RX ORDER — METOPROLOL SUCCINATE 50 MG/1
50 TABLET, EXTENDED RELEASE ORAL DAILY
Status: DISCONTINUED | OUTPATIENT
Start: 2025-03-23 | End: 2025-03-25 | Stop reason: HOSPADM

## 2025-03-23 RX ORDER — SULFAMETHOXAZOLE AND TRIMETHOPRIM 800; 160 MG/1; MG/1
1 TABLET ORAL DAILY
Status: DISCONTINUED | OUTPATIENT
Start: 2025-03-23 | End: 2025-03-25 | Stop reason: HOSPADM

## 2025-03-23 RX ORDER — ATORVASTATIN CALCIUM 40 MG/1
40 TABLET, FILM COATED ORAL DAILY
Status: DISCONTINUED | OUTPATIENT
Start: 2025-03-23 | End: 2025-03-25 | Stop reason: HOSPADM

## 2025-03-23 RX ORDER — ONDANSETRON 4 MG/1
4 TABLET, ORALLY DISINTEGRATING ORAL EVERY 8 HOURS PRN
Status: DISCONTINUED | OUTPATIENT
Start: 2025-03-23 | End: 2025-03-25 | Stop reason: HOSPADM

## 2025-03-23 RX ORDER — BENZONATATE 100 MG/1
200 CAPSULE ORAL 3 TIMES DAILY PRN
Status: DISCONTINUED | OUTPATIENT
Start: 2025-03-23 | End: 2025-03-25 | Stop reason: HOSPADM

## 2025-03-23 RX ORDER — INSULIN ASPART 100 [IU]/ML
0-5 INJECTION, SOLUTION INTRAVENOUS; SUBCUTANEOUS
Status: DISCONTINUED | OUTPATIENT
Start: 2025-03-23 | End: 2025-03-25 | Stop reason: HOSPADM

## 2025-03-23 RX ORDER — BUDESONIDE 0.5 MG/2ML
1 INHALANT ORAL 2 TIMES DAILY
Status: DISCONTINUED | OUTPATIENT
Start: 2025-03-23 | End: 2025-03-25 | Stop reason: HOSPADM

## 2025-03-23 RX ORDER — PANTOPRAZOLE SODIUM 40 MG/1
40 TABLET, DELAYED RELEASE ORAL DAILY
Status: DISCONTINUED | OUTPATIENT
Start: 2025-03-23 | End: 2025-03-23

## 2025-03-23 RX ORDER — FUROSEMIDE 10 MG/ML
40 INJECTION INTRAMUSCULAR; INTRAVENOUS 2 TIMES DAILY
Status: DISCONTINUED | OUTPATIENT
Start: 2025-03-23 | End: 2025-03-24

## 2025-03-23 RX ORDER — GUAIFENESIN 600 MG/1
600 TABLET, EXTENDED RELEASE ORAL 2 TIMES DAILY
Status: DISCONTINUED | OUTPATIENT
Start: 2025-03-23 | End: 2025-03-23

## 2025-03-23 RX ORDER — SIMETHICONE 80 MG
1 TABLET,CHEWABLE ORAL 3 TIMES DAILY PRN
Status: DISCONTINUED | OUTPATIENT
Start: 2025-03-23 | End: 2025-03-25 | Stop reason: HOSPADM

## 2025-03-23 RX ORDER — MYCOPHENOLATE MOFETIL 250 MG/1
1000 CAPSULE ORAL 2 TIMES DAILY
Status: DISCONTINUED | OUTPATIENT
Start: 2025-03-23 | End: 2025-03-25 | Stop reason: HOSPADM

## 2025-03-23 RX ORDER — GLUCAGON 1 MG
1 KIT INJECTION
Status: DISCONTINUED | OUTPATIENT
Start: 2025-03-23 | End: 2025-03-25 | Stop reason: HOSPADM

## 2025-03-23 RX ORDER — NAPROXEN SODIUM 220 MG/1
81 TABLET, FILM COATED ORAL DAILY
Status: DISCONTINUED | OUTPATIENT
Start: 2025-03-23 | End: 2025-03-25 | Stop reason: HOSPADM

## 2025-03-23 RX ORDER — GUAIFENESIN 600 MG/1
1200 TABLET, EXTENDED RELEASE ORAL 2 TIMES DAILY
Status: DISCONTINUED | OUTPATIENT
Start: 2025-03-24 | End: 2025-03-23

## 2025-03-23 RX ORDER — SUCRALFATE 1 G/1
1 TABLET ORAL
Status: DISCONTINUED | OUTPATIENT
Start: 2025-03-23 | End: 2025-03-25 | Stop reason: HOSPADM

## 2025-03-23 RX ORDER — FAMOTIDINE 20 MG/1
20 TABLET, FILM COATED ORAL DAILY
Status: DISCONTINUED | OUTPATIENT
Start: 2025-03-24 | End: 2025-03-25 | Stop reason: HOSPADM

## 2025-03-23 RX ORDER — ACETAMINOPHEN 325 MG/1
650 TABLET ORAL EVERY 6 HOURS PRN
Status: DISCONTINUED | OUTPATIENT
Start: 2025-03-23 | End: 2025-03-25 | Stop reason: HOSPADM

## 2025-03-23 RX ORDER — HYDROXYZINE HYDROCHLORIDE 25 MG/1
25 TABLET, FILM COATED ORAL 3 TIMES DAILY PRN
Status: DISCONTINUED | OUTPATIENT
Start: 2025-03-23 | End: 2025-03-25 | Stop reason: HOSPADM

## 2025-03-23 RX ORDER — AMLODIPINE BESYLATE 5 MG/1
10 TABLET ORAL DAILY
Status: DISCONTINUED | OUTPATIENT
Start: 2025-03-23 | End: 2025-03-25 | Stop reason: HOSPADM

## 2025-03-23 RX ORDER — PREDNISONE 20 MG/1
40 TABLET ORAL DAILY
Status: DISCONTINUED | OUTPATIENT
Start: 2025-03-24 | End: 2025-03-25 | Stop reason: HOSPADM

## 2025-03-23 RX ORDER — SODIUM CHLORIDE 0.9 % (FLUSH) 0.9 %
10 SYRINGE (ML) INJECTION
Status: DISCONTINUED | OUTPATIENT
Start: 2025-03-23 | End: 2025-03-23

## 2025-03-23 RX ORDER — TALC
6 POWDER (GRAM) TOPICAL NIGHTLY PRN
Status: DISCONTINUED | OUTPATIENT
Start: 2025-03-23 | End: 2025-03-25 | Stop reason: HOSPADM

## 2025-03-23 RX ORDER — PREDNISONE 20 MG/1
40 TABLET ORAL
Status: COMPLETED | OUTPATIENT
Start: 2025-03-23 | End: 2025-03-23

## 2025-03-23 RX ORDER — HYDRALAZINE HYDROCHLORIDE 20 MG/ML
10 INJECTION INTRAMUSCULAR; INTRAVENOUS EVERY 6 HOURS PRN
Status: DISCONTINUED | OUTPATIENT
Start: 2025-03-23 | End: 2025-03-25 | Stop reason: HOSPADM

## 2025-03-23 RX ORDER — IPRATROPIUM BROMIDE AND ALBUTEROL SULFATE 2.5; .5 MG/3ML; MG/3ML
3 SOLUTION RESPIRATORY (INHALATION)
Status: DISCONTINUED | OUTPATIENT
Start: 2025-03-23 | End: 2025-03-24

## 2025-03-23 RX ORDER — FUROSEMIDE 10 MG/ML
40 INJECTION INTRAMUSCULAR; INTRAVENOUS
Status: COMPLETED | OUTPATIENT
Start: 2025-03-23 | End: 2025-03-23

## 2025-03-23 RX ORDER — IBUPROFEN 200 MG
16 TABLET ORAL
Status: DISCONTINUED | OUTPATIENT
Start: 2025-03-23 | End: 2025-03-25 | Stop reason: HOSPADM

## 2025-03-23 RX ORDER — GUAIFENESIN 600 MG/1
1200 TABLET, EXTENDED RELEASE ORAL 2 TIMES DAILY
Status: DISCONTINUED | OUTPATIENT
Start: 2025-03-23 | End: 2025-03-25 | Stop reason: HOSPADM

## 2025-03-23 RX ADMIN — HYDROXYZINE HYDROCHLORIDE 25 MG: 25 TABLET ORAL at 05:03

## 2025-03-23 RX ADMIN — ASPIRIN 81 MG CHEWABLE TABLET 81 MG: 81 TABLET CHEWABLE at 02:03

## 2025-03-23 RX ADMIN — IPRATROPIUM BROMIDE AND ALBUTEROL SULFATE 3 ML: 2.5; .5 SOLUTION RESPIRATORY (INHALATION) at 02:03

## 2025-03-23 RX ADMIN — INSULIN ASPART 2 UNITS: 100 INJECTION, SOLUTION INTRAVENOUS; SUBCUTANEOUS at 08:03

## 2025-03-23 RX ADMIN — Medication 6 MG: at 08:03

## 2025-03-23 RX ADMIN — AMLODIPINE BESYLATE 10 MG: 5 TABLET ORAL at 02:03

## 2025-03-23 RX ADMIN — IPRATROPIUM BROMIDE AND ALBUTEROL SULFATE 3 ML: 2.5; .5 SOLUTION RESPIRATORY (INHALATION) at 07:03

## 2025-03-23 RX ADMIN — FUROSEMIDE 40 MG: 10 INJECTION, SOLUTION INTRAVENOUS at 10:03

## 2025-03-23 RX ADMIN — MYCOPHENOLATE MOFETIL 1000 MG: 250 CAPSULE ORAL at 08:03

## 2025-03-23 RX ADMIN — SUCRALFATE 1 G: 1 TABLET ORAL at 04:03

## 2025-03-23 RX ADMIN — SUCRALFATE 1 G: 1 TABLET ORAL at 05:03

## 2025-03-23 RX ADMIN — BENZONATATE 200 MG: 100 CAPSULE ORAL at 08:03

## 2025-03-23 RX ADMIN — ATORVASTATIN CALCIUM 40 MG: 40 TABLET, FILM COATED ORAL at 02:03

## 2025-03-23 RX ADMIN — FUROSEMIDE 40 MG: 10 INJECTION, SOLUTION INTRAVENOUS at 09:03

## 2025-03-23 RX ADMIN — BUDESONIDE 1 MG: 0.5 INHALANT RESPIRATORY (INHALATION) at 07:03

## 2025-03-23 RX ADMIN — FAMOTIDINE 20 MG: 10 INJECTION, SOLUTION INTRAVENOUS at 10:03

## 2025-03-23 RX ADMIN — SACUBITRIL AND VALSARTAN 1 TABLET: 24; 26 TABLET, FILM COATED ORAL at 08:03

## 2025-03-23 RX ADMIN — GUAIFENESIN 1200 MG: 600 TABLET, EXTENDED RELEASE ORAL at 05:03

## 2025-03-23 RX ADMIN — PREDNISONE 40 MG: 20 TABLET ORAL at 01:03

## 2025-03-23 RX ADMIN — SULFAMETHOXAZOLE AND TRIMETHOPRIM 1 TABLET: 800; 160 TABLET ORAL at 02:03

## 2025-03-23 RX ADMIN — SIMETHICONE 80 MG: 80 TABLET, CHEWABLE ORAL at 05:03

## 2025-03-23 RX ADMIN — METOPROLOL SUCCINATE 50 MG: 50 TABLET, EXTENDED RELEASE ORAL at 02:03

## 2025-03-23 RX ADMIN — SUCRALFATE 1 G: 1 TABLET ORAL at 08:03

## 2025-03-23 NOTE — PLAN OF CARE
Discharge planning    Chart reviewed today  Care plan discussed at bedside  Self reports portable oxygen machine is not working correctly and there is no battery power to address the concerns.  Spoke with [facility] on-call for the [facility]  The on-call team member, Jayson, will  come here tonight and address the oxygen machine and possible replacement or repair  Anticipate discharging have his oxygen needs are addressed

## 2025-03-23 NOTE — ASSESSMENT & PLAN NOTE
Patient presents to ED for worsen shortness of breath while walking down step to go store this am. He is no longer taking prednisone but not sure if wean or not. He is compliance with cellcept , AKILA  Reviewed Dr. Rivas clinic note from last month and plan was to wean prednisone   EKG ST , BNP elevated   98% on 3 L NC in ED  Continue Bactrim for PCP prophylaxis  Continue Cellcept   Brother to home OFEV  Start prednisone 40 mg then taper, schedule duoneb/steroid neb  Lasix 40 mg IV BID  If no improvement, will consult Pulmonologist

## 2025-03-23 NOTE — ED PROVIDER NOTES
"Encounter Date: 3/23/2025    SCRIBE #1 NOTE: I, Endy Huston, am scribing for, and in the presence of,  Joyce Vázquez MD. I have scribed the following portions of the note - Other sections scribed: HPI, ROS, PE.       History     Chief Complaint   Patient presents with    Shortness of Breath     Pt presents to the ED with c/o shortness of breath beginning yesterday. Pt is on 3L home O2.        Emmanuel Grant is a 74 y.o. male, with a PMHx of acute hypoxic respiratory failure, chronic HFrEF, ejection fraction of 40 - 45% (03/17/2025), BPH, DM, CAD, HTN, nephrolithiasis, and stroke, who presents to the ED with SOB began yesterday. Patient reports associated symptoms of dry cough. He was seen yesterday at the ED for similar s/s but came back stating that his oxygen tank is not working. He states that he is usually in 3L, but he increased it to 5L and it did not improved his symptoms. Reports that he uses his inhaler as well and states that he still have "27 pumps left." Patient notes that he wants his oxygen tank to be replaced since it doesn't seem to be working. No other exacerbating or alleviating factors. Denies any recent ill contact. Denies fever, chest pain or other associated symptoms. Patient is on duo-neb, amlodipine, aspirin 81 MG, albuterol, metformin, metoprolol, rosuvastatin, nitroglycerin, mirabegron, glimepiride, famotidine, furosemide, and cellcept for his daily medications. Patient denies EtOH, tobacco, or illicit drug use. Patient reports a PSHx of stent placement, cardiac catheterization, shoulder surgery, colonoscopy, esophagogastroduodenoscopy, and left heart catheterization. States drug allergies to dapagliflozin, penicillins, linagliptin, lisinopril, and pantoprazole.     Elevated HR  BP: 160/105  Pulse: 103 bpm  SpO2: 100%    External documents reviewed:   03/16/2025:  Patient was seen at the ED for chest pain. Concern at this time for ACS possible given history of CAD, HTN, dm, " "HEART score 5 prior to initial troponin results.  Patient was last catheterization in September which showed nonobstructive coronary artery disease.  PE less likely given no hypoxia, sats > 95%,  no tachypnea, however still considering given tachycardia.  Less likely dissection given  no "tearing" sensation or radiation to back, stable vitals, no focal neuro deficits, 2+ pulses in all 4 extremities. PNA less likely given lack of fever, cough, focal findings on lung ausculation. Equal bilateral breath sounds do not suggest PTX.  Pain is not reproducible on exam to suggest costochondritis or alternative MSK etiology.  03/22/2025:  Patient was seen at the ED for simlar symptoms. He was diagnosed with hypokalemia, SOB, and elevated BP reading. Patient was given famotidine, potassium chloride SA, and sacubitriL-valsartan.        The history is provided by the patient and medical records. No  was used.     Review of patient's allergies indicates:   Allergen Reactions    Dapagliflozin      Other reaction(s): Other (See Comments)    Pcn [penicillins]     Linagliptin Other (See Comments)     "it knocked me down", "it almost killed me"    Lisinopril Other (See Comments)     cough    Pantoprazole Hives     Past Medical History:   Diagnosis Date    Acute hypoxic respiratory failure 09/23/2024    Oxygen therapy 24/7 now, continue 2 L oxygen therapy.      BPH (benign prostatic hyperplasia) 02/28/2024    CAD (coronary artery disease)     Sees [facility], h/o stent    Chronic HFrEF (heart failure with reduced ejection fraction) 05/01/2024    Diabetes mellitus     Hypertension     Kidney stone     Stroke     age 62    Type 2 diabetes mellitus without complication, without long-term current use of insulin 10/08/2021     Past Surgical History:   Procedure Laterality Date    24 HOUR IMPEDANCE PH MONITORING OF ESOPHAGUS IN PATIENT NOT TAKING ACID REDUCING MEDICATIONS N/A 11/13/2024    Procedure: IMPEDANCE PH STUDY, " ESOPHAGEAL, 24 HOUR, IN PATIENT NOT TAKING ACID REDUCING MEDICATION;  Surgeon: Stephany Mendoza MD;  Location: [facility] (4TH FLR);  Service: Endoscopy;  Laterality: N/A;  referral dr miguel/ off ppi / prep inst sent to pt via mail  11/6- precall attempted no answer. Unable to [facility]-  11/8 - pt not called, per chart review patient currently inpatient at [place] [id number]    CARDIAC CATHETERIZATION      with stent    COLONOSCOPY N/A 03/27/2024    Procedure: COLONOSCOPY;  Surgeon: Ariela Castro MD;  Location: [facility];  Service: Endoscopy;  Laterality: N/A;    ESOPHAGEAL MANOMETRY WITH MEASUREMENT OF IMPEDANCE N/A 11/13/2024    Procedure: MANOMETRY, ESOPHAGUS, WITH IMPEDANCE MEASUREMENT;  Surgeon: Stephany Mendoza MD;  Location: [facility] (4TH FLR);  Service: Endoscopy;  Laterality: N/A;    ESOPHAGOGASTRODUODENOSCOPY N/A 03/27/2024    Procedure: EGD (ESOPHAGOGASTRODUODENOSCOPY);  Surgeon: Ariela Castro MD;  Location: [facility];  Service: Endoscopy;  Laterality: N/A;    LEFT HEART CATHETERIZATION Left 9/10/2024    Procedure: Left heart cath;  Surgeon: Jemal Drummond MD;  Location: [facility] CATH LAB;  Service: Cardiology;  Laterality: Left;    SHOULDER SURGERY Right      Family History   Problem Relation Name Age of Onset    Cancer Mother      Colon cancer Sister      Esophageal cancer Neg Hx       Social History[1]  Review of Systems   Constitutional:  Negative for chills, diaphoresis and fever.   HENT:  Negative for drooling, sore throat and voice change.    Eyes:  Negative for photophobia and visual disturbance.   Respiratory:  Positive for cough and shortness of breath. Negative for wheezing.    Cardiovascular:  Negative for chest pain and leg swelling.   Gastrointestinal:  Negative for abdominal pain, blood in stool, constipation, diarrhea, nausea and vomiting.   Genitourinary:  Negative for dysuria, frequency, hematuria and urgency.   Musculoskeletal:  Negative for myalgias, neck pain and neck  stiffness.   Skin:  Negative for rash and wound.   Neurological:  Negative for syncope, weakness, light-headedness, numbness and headaches.   Hematological:  Does not bruise/bleed easily.   Psychiatric/Behavioral:  Negative for agitation, confusion and suicidal ideas.    All other systems reviewed and are negative.      Physical Exam     Initial Vitals   BP Pulse Resp Temp SpO2   03/23/25 0908 03/23/25 0908 03/23/25 0908 03/23/25 0918 03/23/25 0908   (!) 156/103 (!) 150 (!) 26 98.5 °F (36.9 °C) (!) 85 %      MAP       --                Physical Exam    Nursing note and vitals reviewed.  Constitutional: He appears well-developed and well-nourished. He is not diaphoretic. No distress. Nasal cannula in place.   Patient noted to be 83% on 5 L oxygen concentrator.  He was switched to 5 L nasal cannula on our wall with improvement of his symptoms.   HENT:   Head: Normocephalic and atraumatic.   Right Ear: External ear normal.   Left Ear: External ear normal. Mouth/Throat: Oropharynx is clear and moist. No oropharyngeal exudate.   Eyes: Conjunctivae and EOM are normal. Pupils are equal, round, and reactive to light. Right eye exhibits no discharge. Left eye exhibits no discharge.   Neck: Neck supple. No JVD present.   Normal range of motion.  Cardiovascular:  Regular rhythm, normal heart sounds and intact distal pulses.     Exam reveals no gallop and no friction rub.       No murmur heard.  Patient is tachycardic to 150s.   Pulmonary/Chest: He is in respiratory distress. He has no wheezes. He has no rhonchi. He has rales.   Frequent dry cough. Rales noted.  Appears to be acutely short of breath, able to speak in 3-4 word sentences.  Tachypnea.   Abdominal: Abdomen is soft. Bowel sounds are normal. He exhibits no distension. There is no abdominal tenderness. There is no rebound and no guarding.   Musculoskeletal:         General: No tenderness or edema. Normal range of motion.      Cervical back: Normal range of motion and  neck supple.     Lymphadenopathy:     He has no cervical adenopathy.   Neurological: He is alert and oriented to person, place, and time. No cranial nerve deficit.   Skin: Skin is warm and dry.   Psychiatric: He has a normal mood and affect. Thought content normal.         ED Course   Critical Care    Date/Time: 3/23/2025 6:20 PM    Performed by: Joyce Vázquez MD  Authorized by: Macario Montgomery MD  Direct patient critical care time: 35 minutes  Ordering / reviewing critical care time: 10 minutes  Documentation critical care time: 10 minutes  Consulting other physicians critical care time: 10 minutes  Total critical care time (exclusive of procedural time) : 65 minutes  Critical care was necessary to treat or prevent imminent or life-threatening deterioration of the following conditions: respiratory failure.  Critical care was time spent personally by me on the following activities: development of treatment plan with patient or surrogate, discussions with consultants, interpretation of cardiac output measurements, evaluation of patient's response to treatment, examination of patient, obtaining history from patient or surrogate, ordering and performing treatments and interventions, ordering and review of laboratory studies, ordering and review of radiographic studies, pulse oximetry, re-evaluation of patient's condition and review of old charts.        Labs Reviewed   COMPREHENSIVE METABOLIC PANEL - Abnormal       Result Value    Sodium 138      Potassium 4.4      Chloride 103      CO2 21 (*)     Glucose 292 (*)     BUN 13      Creatinine 1.1      Calcium 9.9      Protein Total 8.4      Albumin 3.7      Bilirubin Total 0.6      ALP 99      AST 23      ALT 45 (*)     Anion Gap 14      eGFR >60     B-TYPE NATRIURETIC PEPTIDE - Abnormal     (*)    CBC WITH DIFFERENTIAL - Abnormal    WBC 7.01      RBC 4.55 (*)     HGB 12.6 (*)     HCT 36.9 (*)     MCV 81 (*)     MCH 27.7      MCHC 34.1      RDW 14.2       Platelet Count 228      MPV 9.3      Nucleated RBC 0      Neut % 59.6      Lymph % 24.4      Mono % 9.6      Eos % 5.8      Basophil % 0.3      Imm Grans % 0.3      Neut # 4.18      Lymph # 1.71      Mono # 0.67      Eos # 0.41      Baso # 0.02      Imm Grans # 0.02     ISTAT PROCEDURE - Abnormal    POC PH 7.304 (*)     POC PCO2 54.5 (*)     POC PO2 17 (*)     POC HCO3 27.1      POC BE 0      POC SATURATED O2 19      POC TCO2 29      Sample VENOUS      Site Other      Allens Test N/A     MAGNESIUM - Normal    Magnesium  1.7     TROPONIN I - Normal    Troponin-I 0.014     RSV ANTIGEN DETECTION - Normal    RSV, RAPID BY MOLECULAR METHOD Negative     CBC W/ AUTO DIFFERENTIAL    Narrative:     The following orders were created for panel order CBC auto differential.  Procedure                               Abnormality         Status                     ---------                               -----------         ------                     CBC with Differential[[id number]]       Abnormal            Final result                 Please view results for these tests on the individual orders.   SARS-COV-2 RDRP GENE    POC Rapid COVID Negative       Acceptable Yes     POCT INFLUENZA A/B MOLECULAR    POC Molecular Influenza A Ag Negative      POC Molecular Influenza B Ag Negative       Acceptable Yes     POCT GLUCOSE MONITORING CONTINUOUS          Imaging Results              X-Ray Chest PA And Lateral (Final result)  Result time 03/23/25 10:45:44      Final result by Adis Aguilar MD (03/23/25 10:45:44)                   Impression:      As above.      Electronically signed by: Adis Aguilar  Date:    03/23/2025  Time:    10:45               Narrative:    EXAMINATION:  XR CHEST PA AND LATERAL    CLINICAL HISTORY:  Shortness of breath    TECHNIQUE:  PA and lateral views of the chest were performed.    COMPARISON:  03/22/2025; 03/16/2025    FINDINGS:  Overlying monitoring leads.   Cardiomediastinal silhouette unchanged.  Biapical scarring.  Lung parenchymal changes in keeping with sequela of chronic interstitial disease, similar in distribution.  No new opacity or effusion in the short interval.  No gross pneumothorax.  No acute osseous abnormality.                                       Medications   albuterol-ipratropium 2.5 mg-0.5 mg/3 mL nebulizer solution 3 mL (3 mLs Nebulization Given 3/23/25 1429)   amLODIPine tablet 10 mg (10 mg Oral Given 3/23/25 1442)   aspirin chewable tablet 81 mg (81 mg Oral Given 3/23/25 1442)   budesonide nebulizer solution 1 mg (has no administration in time range)   famotidine tablet 20 mg (has no administration in time range)   metoprolol succinate (TOPROL-XL) 24 hr tablet 50 mg (50 mg Oral Given 3/23/25 1442)   mycophenolate capsule 1,000 mg (has no administration in time range)   atorvastatin tablet 40 mg (40 mg Oral Given 3/23/25 1442)   sacubitriL-valsartan 24-26 mg per tablet 1 tablet (has no administration in time range)   sulfamethoxazole-trimethoprim 800-160mg per tablet 1 tablet (1 tablet Oral Given 3/23/25 1442)   sucralfate tablet 1 g (1 g Oral Given 3/23/25 1738)   furosemide injection 40 mg (has no administration in time range)   predniSONE tablet 40 mg (has no administration in time range)   guaiFENesin 12 hr tablet 1,200 mg (1,200 mg Oral Given 3/23/25 1738)   hydrOXYzine HCL tablet 25 mg (25 mg Oral Given 3/23/25 1737)   hydrALAZINE injection 10 mg (has no administration in time range)   melatonin tablet 6 mg (has no administration in time range)   polyethylene glycol packet 17 g (has no administration in time range)   ondansetron disintegrating tablet 4 mg (has no administration in time range)   acetaminophen tablet 650 mg (has no administration in time range)   glucose chewable tablet 16 g (has no administration in time range)   glucose chewable tablet 24 g (has no administration in time range)   dextrose 50% injection 12.5 g (has no  "administration in time range)   dextrose 50% injection 25 g (has no administration in time range)   glucagon (human recombinant) injection 1 mg (has no administration in time range)   insulin aspart U-100 pen 0-5 Units (has no administration in time range)   simethicone chewable tablet 80 mg (80 mg Oral Given 3/23/25 1738)   famotidine (PF) injection 20 mg (20 mg Intravenous Given 3/23/25 1006)   furosemide injection 40 mg (40 mg Intravenous Given 3/23/25 1055)   predniSONE tablet 40 mg (40 mg Oral Given 3/23/25 1338)     Medical Decision Making  External documents reviewed: 03/16/2025:  Patient was seen at the ED for chest pain. Concern at this time for ACS possible given history of CAD, HTN, dm, HEART score 5 prior to initial troponin results.  Patient was last catheterization in September which showed nonobstructive coronary artery disease.  PE less likely given no hypoxia, sats > 95%,  no tachypnea, however still considering given tachycardia.  Less likely dissection given  no "tearing" sensation or radiation to back, stable vitals, no focal neuro deficits, 2+ pulses in all 4 extremities. PNA less likely given lack of fever, cough, focal findings on lung ausculation. Equal bilateral breath sounds do not suggest PTX.  Pain is not reproducible on exam to suggest costochondritis or alternative MSK etiology.    External documents reviewed: 03/22/2025:  Patient was seen at the ED for simlar symptoms. He was diagnosed with hypokalemia, SOB, and elevated BP reading. Patient was given famotidine, potassium chloride SA, and sacubitriL-valsartan.    Amount and/or Complexity of Data Reviewed  External Data Reviewed: notes.     Details: See HPI & MDM  Labs: ordered. Decision-making details documented in ED Course.  Radiology: ordered. Decision-making details documented in ED Course.  ECG/medicine tests: ordered and independent interpretation performed. Decision-making details documented in ED Course.     Details: See " summary below.    Risk  Prescription drug management.        74-year-old male with past medical history of hypertension,  diabetes, CAD with stent, HFrEF, CVA,  ILD with hypoxic respiratory failure on home oxygen 3 L presents with acute shortness of breath.  Patient states that his machine was changed a few days ago and since that time his concentrator at home in his concentrator portable have not been working.  He is extremely short of breath.  He increased it to 5 L with no improvement.  Upon his arrival his heart rate was in the 150s and his oxygen was 86 on 5 L. he is having a frequent dry cough.  He denies any recent chest pain.  He denies any recent fever.  He reports regular sputum with coughing.  Denies any recent sick contacts.  States he does not feel ill and if his oxygen was working he would feel his normal self. He states he believes his oxygen isn't working. He had frequent coughing. Rales bilaterally. No peripheral edema. Patient's concentrator was thought to be broken yesterday and they had the oxygen technician come out and evaluate it. It appeared to be working well at that time. He states he feels like it isn't working. His allergies were confirmed in the chart.  He denies any alcohol tobacco or other drugs.  Differential diagnosis includes was not limited to oxygen tank malfunction, interstitial lung disease flare, COPD, ACS, pneumonia, COVID, flu, RSV.       patient was admitted for an interstitial lung disease exacerbation to the ICU in the end of January.  He was started on a steroid taper.  He is on CellCept or this disease.   Palliative Care evaluated the patient.      He was seen by Pulmonary on 02/27 he had worsening hypoxia.  He lives alone and manages his own medications.  He is noted to have severe interstitial lung disease.  He is on 3 L and occasional increased.  He was started on PCP prophylaxis.  He was advised to palliative care clinic as well as lung transplant clinic.     I spoke  with social work and they save this needs to be ordered by the pulmonologist. I did speak with Dr. Devries with pulmonology and given that he has increased BNP we would likely fluid overloaded he would like him to have diuresis as well as start on a steroid taper of 40 mg for 5 days, 20 mg for 5 days and 10 mg for 5 days. He is currently seeing palliative but does not seem to have good insight into his disease process. They are evaluating for lung transplant as well. He has urinated around 800 mL in the ED after Lasix 40. He is on 3 L oxygen at this time. Labs with anemia at 12.6. No leukocytosis. COVID and flu negative. RSV negative. Blood glucose slightly elevated at 292. BNP elevated from all of his previous at 258, last 105. Troponin normal. CO2 slightly elevated at 54 with a pH is 7.3. Chest x-ray chronic interstitial disease similar to previous. Patient lives alone. Given his 2 visits in the ED for shortness of breath with hypoxia, severe disease we would like to bring him in for further diuresis, continue to work on appropriate oxygen and steroid taper. Shared decision making: consulted and discussed with hospital medicine for admission. Patient will be placed in observation under Yumiko PEÑALOZA's service.             Scribe Attestation:   Scribe #1: I performed the above scribed service and the documentation accurately describes the services I performed. I attest to the accuracy of the note.        ED Course as of 03/23/25 1820   Sun Mar 23, 2025   0925 EKG 12-lead  Sinus tachycardia with PACs at 1:15 a.m..  No significant ST elevation or depression.  T-wave inversions in 1 and aVL.  Normal axis.  QTC is 437.  Use EKG is similar to his previous from yesterday given background noise. [JT]      ED Course User Index  [JT] Joyce Vázquez MD                       I, Joyce Vázquez, personally performed the services described in this documentation. All medical record entries made by the scribe were at my direction and in  my presence. I have reviewed the chart and agree that the record reflects my personal performance and is accurate and complete.      DISCLAIMER: This note was prepared with Getting-in voice recognition transcription software. Garbled syntax, mangled pronouns, and other bizarre constructions may be attributed to that software system.      Clinical Impression:  Final diagnoses:  [R06.02] SOB (shortness of breath)          ED Disposition Condition    Observation                     [1]   Social History  Tobacco Use    Smoking status: Never     Passive exposure: Never    Smokeless tobacco: Never   Substance Use Topics    Alcohol use: No    Drug use: Never        Joyce Vázquez MD  03/23/25 1220

## 2025-03-23 NOTE — HPI
Emmanuel Grant is a 75 yo male with pmhx hypertenion, hyperlipidemia, DMT2, CAD sp stents , HFrEF EF 40-45%, DMT2, chronic hypoxia respiratory failure on home oxygen due to  ILD/IPF on immunosupression therapy (cellcept 1 g BID and OFEV 150 mg BID,  Bactrim daily) on home oxygen 3 L NC who presents again to ED for shortness of breath since yesterday. He have chronic productive cough. There was issues with portable home oxygen yesterday when he want to ED main campus and still issue. He was on his way to store this am but worsen shortness of breath which prompt him to call his brother he brought him to ER. No home treatment prior to admission. Denies fever, chest pain.   Previously on prednisone 60 mg daily then recently stopped (not sure if he wean or completely stop).   Recent admission for CP 3/16-3/17 NST large fixed anteroapical defect similar to previous NST 6/17/2024)    EKG ST PAC Qtc 435.  (elevated compared to previous), Troponin negative.  RSV, COVID 19, and Flu negative   CXR CLD similar to previous.

## 2025-03-23 NOTE — PLAN OF CARE
[facility] - Emergency Dept  Initial Discharge Assessment       Primary Care Provider: Wilfredo De Souza MD    Admission Diagnosis: No admission diagnoses are documented for this encounter.    Admission Date: 3/22/2025  Expected Discharge Date:     Transition of Care Barriers: (P) Other (see comments) (Portable O2, possibly needs a repair or swap)    Payor: HU[employer] MANAGED MEDICARE / Plan: HU[employer] MEDICARE HMO / Product Type: Capitation /     Extended Emergency Contact Information  Primary Emergency Contact: Emmanuel Grant Jr   [place]  Mobile Phone: [phone]  Relation: Son    Discharge Plan A: (P) Home  Discharge Plan B: (P) Home      [id number] [address] AT [facility] & [address] [address]  Phone: [phone] Fax: [phone]    [facility] [address] [facility]  [address]   [facility] [address]  Phone: [phone] Fax: [phone]      Initial Assessment (most recent)       Adult Discharge Assessment - 03/22/25 1915          Discharge Assessment    Assessment Type Discharge Planning Assessment (P)      Confirmed/corrected address, phone number and insurance Yes (P)      Confirmed Demographics Correct on Facesheet (P)      Source of Information patient (P)      Do you expect to return to your current living situation? Yes (P)      Prior to hospitilization cognitive status: Alert/Oriented (P)      Current cognitive status: Alert/Oriented (P)      Equipment Currently Used at Home oxygen (P)      Patient currently being followed by outpatient case management? No (P)      Do you currently have service(s) that help you manage your care at home? No (P)      Do you take prescription medications? Yes (P)      Do you have prescription coverage? Yes (P)      Do you have any problems affording any of your prescribed medications? No (P)      Is the patient taking medications as prescribed? yes (P)      How do you get  to doctors appointments? family or friend will provide;car, drives self (P)      Are you on dialysis? No (P)      Discharge Plan A Home (P)      Discharge Plan B Home (P)      DME Needed Upon Discharge  oxygen;other (see comments) (P)    [facility] to assist with his tank not working correctly    Discharge Plan discussed with: Patient (P)      Transition of Care Barriers Other (see comments) (P)    Portable O2, possibly needs a repair or swap       Housing Stability    At any time in the past 12 months, were you homeless or living in a shelter (including now)? No (P)         Transportation Needs    In the past 12 months, has lack of transportation kept you from medical appointments or from getting medications? No (P)      In the past 12 months, has lack of transportation kept you from meetings, work, or from getting things needed for daily living? No (P)         Food Insecurity    Within the past 12 months, you worried that your food would run out before you got the money to buy more. Never true (P)         Alcohol Use    Q1: How often do you have a drink containing alcohol? Patient declined (P)         Health Literacy    How often do you need to have someone help you when you read instructions, pamphlets, or other written material from your doctor or pharmacy? Sometimes (P)

## 2025-03-23 NOTE — PLAN OF CARE
SW met with patient at the bedside to discuss problem with oxygen.  Pt was at [facility] yesterday and O2 was supposed to have been repaired.  Dr. Vázquez reported that patient stated that the technician recommended  a Kan O2 Concentrator.  SW to followup with [facility] to inquire if this is a product that [facility] has.  Patient needs continuous O2 flow.        Kelsi from [facility] advised SW to provide patient with 1 portable tank at discharge and the technican would followup with patient tomorrow to check on the concentrator.    SW spoke with patient who confirmed that he has a home concentrator that is working properly.  He also confirmed that he has one portable tank at home.      SW updated Dr. Vázquez on the above.    Addendum:  SW returned to patient's room and delivered one O2 Setup as approved by Kelsi.  The single e-tank was returned to the DME closet.  Delivery ticket signed per protocol.    in-Basket Staff Message sent to patient's pulmonologist  (Naheed Schmitt) advising of need for a hospital followup appointment.

## 2025-03-23 NOTE — ASSESSMENT & PLAN NOTE
Results for orders placed during the hospital encounter of 03/16/25  Echo  Interpretation Summary    Left Ventricle: The left ventricle is normal in size. There is mildly reduced systolic function with a visually estimated ejection fraction of 40 - 45%.    Right Ventricle: The right ventricle is normal in size. Systolic function is normal.    Aortic Valve: There is mild aortic valve sclerosis.    Mitral Valve: There is moderate mitral annular calcification.    Aorta: Aortic root is mildly dilated measuring 3.73 cm.    Pulmonary Artery: The estimated pulmonary artery systolic pressure is 30 mmHg.    IVC/SVC: Normal venous pressure at 3 mmHg.  BNP elevated compared to prior  Lasix 40 mg BID  Continue GDMT as tolerated-entresto, metoprolol

## 2025-03-23 NOTE — PLAN OF CARE
Case Management Assessment     PCP: Wilfredo De Souza MD    Pharmacy: [facility] in [place]    Patient Arrived From: Home alone  Existing Help at Home: brother    Barriers to Discharge: None    Discharge Plan:    A. Home   B. Home Health/OP Case Management      Discharge planning assessment completed with information obtained from patient and chart.  Patient is from home  and independent, but is also O2 dependent.  Patient has home O2.  His current portable is pulse dose.  On Sunday, 3/23/2025, Kelsi with [facility] approved 1 e-tank setup for patient because he needed a portable with continuous flow.  Patient has a home concentrator as well.      When medically stable, patient will discharge to  home . Patient's brother will transport home.  Patient will need followup appointments  with PCP.  An [email or web address] message was sent to the Pulmonolgist advising of patient's need for an appointment to be re-evaluated for O2.      Patient needs to contact [facility] after he gets home to have his concentrator checked and to have one of the O2 setups picked up.   Case Management will continue to follow and assist with discharge planning.         03/23/25 1513   Discharge Planning   Assessment Type Discharge Planning Brief Assessment   Resource/Environmental Concerns none   Support Systems Family members  (brother)   Assistance Needed Independent with O2.   Equipment Currently Used at Home oxygen   Current Living Arrangements home   Care Facility Name n/a   Patient/Family Anticipates Transition to home   Patient/Family Anticipated Services at Transition durable medical equipment;medical specialist  (Call [facility] to schedule appt to have concentrator checked. and f/u appt with Pulmonologist)   DME Needed Upon Discharge  other (see comments)  (1 portable setup approved over the weekend.)   Discharge Plan A Home   Discharge Plan B Home Health;Other  (OP Case Management)   Housing Stability   In the last 12 months, was there a  time when you were not able to pay the mortgage or rent on time? N   At any time in the past 12 months, were you homeless or living in a shelter (including now)? N   Transportation Needs   In the past 12 months, has lack of transportation kept you from medical appointments or from getting medications? no   In the past 12 months, has lack of transportation kept you from meetings, work, or from getting things needed for daily living? No   Food Insecurity   Within the past 12 months, you worried that your food would run out before you got the money to buy more. Never true   Within the past 12 months, the food you bought just didn't last and you didn't have money to get more. Never true   Health Literacy   How often do you need to have someone help you when you read instructions, pamphlets, or other written material from your doctor or pharmacy? Sometimes

## 2025-03-23 NOTE — ED NOTES
Patient presents with a dry cough and SOB. Reports that his oxygen machine has not been working properly. Normally patient on 3L NC at home, but O2 sat was 85% on 5L NC on condenser when patient arrived to ED. Patient is awake and alert and able to speak in full sentences. Placed on wall O2 with improving O2 sats. Md at bedside to riley.

## 2025-03-23 NOTE — ASSESSMENT & PLAN NOTE
Patient's blood pressure range in the last 24 hours was: BP  Min: 134/91  Max: 160/105.The patient's inpatient anti-hypertensive regimen is listed below:  Current Antihypertensives  amLODIPine tablet 10 mg, Daily, Oral  metoprolol succinate (TOPROL-XL) 24 hr tablet 50 mg, Daily, Oral

## 2025-03-23 NOTE — NURSING
Patient arrived to the floor from the ED. Patient up off the stretcher and ambulating around the room as soon as he arrived. Patient asking for something for gas and cough. NP notified. Patient requesting an extension on his O2 so he can walk around. Patient appears to be somewhat anxious. Medication to be administered as ordered. Patient oriented to room and call light use. Bed locked in lowest position, call light in reach, side rails up x2.

## 2025-03-23 NOTE — SUBJECTIVE & OBJECTIVE
Past Medical History:   Diagnosis Date    Acute hypoxic respiratory failure 09/23/2024    Oxygen therapy 24/7 now, continue 2 L oxygen therapy.      BPH (benign prostatic hyperplasia) 02/28/2024    CAD (coronary artery disease)     Sees [facility], h/o stent    Chronic HFrEF (heart failure with reduced ejection fraction) 05/01/2024    Diabetes mellitus     Hypertension     Kidney stone     Stroke     age 62    Type 2 diabetes mellitus without complication, without long-term current use of insulin 10/08/2021       Past Surgical History:   Procedure Laterality Date    24 HOUR IMPEDANCE PH MONITORING OF ESOPHAGUS IN PATIENT NOT TAKING ACID REDUCING MEDICATIONS N/A 11/13/2024    Procedure: IMPEDANCE PH STUDY, ESOPHAGEAL, 24 HOUR, IN PATIENT NOT TAKING ACID REDUCING MEDICATION;  Surgeon: Stephany Mendoza MD;  Location: [facility] (4TH FLR);  Service: Endoscopy;  Laterality: N/A;  referral dr miguel/ off ppi / prep inst sent to pt via mail  11/6- precall attempted no answer. Unable to [facility]-  11/8 - pt not called, per chart review patient currently inpatient at [place] [address]    CARDIAC CATHETERIZATION      with stent    COLONOSCOPY N/A 03/27/2024    Procedure: COLONOSCOPY;  Surgeon: Ariela Castro MD;  Location: [facility];  Service: Endoscopy;  Laterality: N/A;    ESOPHAGEAL MANOMETRY WITH MEASUREMENT OF IMPEDANCE N/A 11/13/2024    Procedure: MANOMETRY, ESOPHAGUS, WITH IMPEDANCE MEASUREMENT;  Surgeon: Stephany Mendoza MD;  Location: [facility] (4TH FLR);  Service: Endoscopy;  Laterality: N/A;    ESOPHAGOGASTRODUODENOSCOPY N/A 03/27/2024    Procedure: EGD (ESOPHAGOGASTRODUODENOSCOPY);  Surgeon: Ariela Castro MD;  Location: [facility] ENDO;  Service: Endoscopy;  Laterality: N/A;    LEFT HEART CATHETERIZATION Left 9/10/2024    Procedure: Left heart cath;  Surgeon: Jemal Drummond MD;  Location: [facility] CATH LAB;  Service: Cardiology;  Laterality: Left;    SHOULDER SURGERY Right        Review of patient's allergies  "indicates:   Allergen Reactions    Dapagliflozin      Other reaction(s): Other (See Comments)    Pcn [penicillins]     Linagliptin Other (See Comments)     "it knocked me down", "it almost killed me"    Lisinopril Other (See Comments)     cough    Pantoprazole Hives       Current Facility-Administered Medications on File Prior to Encounter   Medication    [COMPLETED] famotidine tablet 20 mg    [COMPLETED] potassium chloride SA CR tablet 40 mEq    [COMPLETED] sacubitriL-valsartan 24-26 mg per tablet 1 tablet     Current Outpatient Medications on File Prior to Encounter   Medication Sig    albuterol-ipratropium (DUO-NEB) 2.5 mg-0.5 mg/3 mL nebulizer solution Take 3 mLs by nebulization every 6 (six) hours as needed for Wheezing. Rescue    amLODIPine (NORVASC) 10 MG tablet Take 1 tablet (10 mg total) by mouth once daily.    arformoteroL (BROVANA) 15 mcg/2 mL Nebu Take 2 mLs (15 mcg total) by nebulization 2 (two) times daily. Controller    aspirin 81 MG Chew Take 81 mg by mouth once daily.    blood-glucose sensor (FREESTYLE RAMBO 3 PLUS SENSOR) Blanche Change every 15 days    budesonide (PULMICORT) 0.5 mg/2 mL nebulizer solution Take 4 mLs (1 mg total) by nebulization 2 (two) times daily. Controller    cholecalciferol, vitamin D3, (VITAMIN D3) 50 mcg (2,000 unit) Cap capsule Take 1 capsule by mouth once daily.    famotidine (PEPCID) 20 MG tablet Take 1 tablet (20 mg total) by mouth 2 (two) times daily as needed for Heartburn.    furosemide (LASIX) 20 MG tablet Take 1 tablet (20 mg total) by mouth once daily.    glimepiride (AMARYL) 4 MG tablet Take 1 tablet (4 mg total) by mouth before breakfast.    insulin aspart U-100 (NOVOLOG FLEXPEN U-100 INSULIN) 100 unit/mL (3 mL) InPn pen Inject as needed before meals: 180-230=+1, 231-280=+2, 281-330=+3, 331-380=+4, over 380=+5 units    latanoprost, PF, 0.005 % Drop 1 drop into affected eye in the evening Ophthalmic Once a day    metFORMIN (GLUCOPHAGE-XR) 500 MG ER 24hr tablet Take 1 " "tablet (500 mg total) by mouth 2 (two) times daily with meals.    metoprolol succinate (TOPROL-XL) 50 MG 24 hr tablet Take 1 tablet (50 mg total) by mouth once daily.    mirabegron (MYRBETRIQ) 25 mg Tb24 ER tablet Take 1 tablet (25 mg total) by mouth once daily.    mycophenolate (CELLCEPT) 250 mg Cap Take 4 capsules (1,000 mg total) by mouth 2 (two) times daily.    nintedanib (OFEV) 150 mg Cap Take 1 capsule (150 mg total) by mouth 2 (two) times a day.    nitroGLYCERIN (NITROSTAT) 0.3 MG SL tablet Place 1 tablet (0.3 mg total) under the tongue every 5 (five) minutes as needed for Chest pain.    omeprazole (PRILOSEC) 40 MG capsule Take 40 mg by mouth once daily.    ONETOUCH DELICA PLUS LANCET 33 gauge Misc Apply topically 3 (three) times daily.    pen needle, diabetic 32 gauge x 5/32" Ndle 1 each by Misc.(Non-Drug; Combo Route) route once daily.    predniSONE (DELTASONE) 20 MG tablet Take 2 tablets (40 mg total) by mouth once daily for 30 days, THEN 1 tablet (20 mg total) once daily for 30 days, THEN 0.5 tablets (10 mg total) once daily.    rosuvastatin (CRESTOR) 20 MG tablet Take 1 tablet (20 mg total) by mouth once daily.    sacubitriL-valsartan (ENTRESTO) 24-26 mg per tablet Take 1 tablet by mouth 2 (two) times daily.    sucralfate (CARAFATE) 1 gram tablet Take 1 tablet (1 g total) by mouth 4 (four) times daily before meals and nightly.    sulfamethoxazole-trimethoprim 800-160mg (BACTRIM DS) 800-160 mg Tab Take 1 tablet by mouth once daily.     Family History       Problem Relation (Age of Onset)    Cancer Mother    Colon cancer Sister          Tobacco Use    Smoking status: Never     Passive exposure: Never    Smokeless tobacco: Never   Substance and Sexual Activity    Alcohol use: No    Drug use: Never    Sexual activity: Not Currently     Review of Systems   Constitutional:  Positive for activity change and fatigue.   HENT:  Positive for sore throat. Negative for trouble swallowing.    Eyes: Negative.  "   Respiratory:  Positive for cough and shortness of breath. Negative for wheezing.    Cardiovascular: Negative.    Gastrointestinal: Negative.    Genitourinary: Negative.    Musculoskeletal: Negative.    Neurological: Negative.    Hematological: Negative.    Psychiatric/Behavioral: Negative.       Objective:     Vital Signs (Most Recent):  Temp: 98.5 °F (36.9 °C) (03/23/25 0918)  Pulse: 93 (03/23/25 1506)  Resp: 20 (03/23/25 1429)  BP: (!) 152/103 (03/23/25 1442)  SpO2: 97 % (03/23/25 1506) Vital Signs (24h Range):  Temp:  [97.5 °F (36.4 °C)-98.5 °F (36.9 °C)] 98.5 °F (36.9 °C)  Pulse:  [] 93  Resp:  [16-34] 20  SpO2:  [85 %-100 %] 97 %  BP: (134-160)/() 152/103     Weight: 59 kg (130 lb)  Body mass index is 19.77 kg/m².     Physical Exam  Constitutional:       Appearance: Normal appearance. He is ill-appearing.   HENT:      Head: Atraumatic.      Mouth/Throat:      Mouth: Mucous membranes are dry.   Eyes:      Extraocular Movements: Extraocular movements intact.   Cardiovascular:      Rate and Rhythm: Tachycardia present.      Pulses: Normal pulses.      Heart sounds: Normal heart sounds.   Pulmonary:      Breath sounds: Decreased air movement present.      Comments: Crackles   Speaking in full sentences    Abdominal:      Palpations: Abdomen is soft.   Musculoskeletal:      Right lower leg: No edema.      Left lower leg: No edema.   Skin:     General: Skin is warm and dry.   Neurological:      General: No focal deficit present.      Mental Status: He is oriented to person, place, and time. Mental status is at baseline.                Significant Labs: All pertinent labs within the past 24 hours have been reviewed.    Significant Imaging: I have reviewed all pertinent imaging results/findings within the past 24 hours.

## 2025-03-23 NOTE — H&P
[facility]  [facility] Medicine  History & Physical    Patient Name: Emmanuel Grant  MRN: [id number]  Patient Class: OP- Observation  Admission Date: 3/23/2025  Attending Physician: Macario Montgomery MD   Primary Care Provider: Wilfredo De Souza MD         Patient information was obtained from patient and ER records.     Subjective:     Principal Problem:Interstitial lung disease    Chief Complaint:   Chief Complaint   Patient presents with    Shortness of Breath     Pt presents to the ED with c/o shortness of breath beginning yesterday. Pt is on 3L home O2.           HPI: Emmanuel Grant is a 75 yo male with pmhx hypertenion, hyperlipidemia, DMT2, CAD sp stents , HFrEF EF 40-45%, DMT2, chronic hypoxia respiratory failure on home oxygen due to  ILD/IPF on immunosupression therapy (cellcept 1 g BID and OFEV 150 mg BID,  Bactrim daily) on home oxygen 3 L NC who presents again to ED for shortness of breath since yesterday. He have chronic productive cough. There was issues with portable home oxygen yesterday when he want to ED main campus and still issue. He was on his way to store this am but worsen shortness of breath which prompt him to call his brother he brought him to ER. No home treatment prior to admission. Denies fever, chest pain.   Previously on prednisone 60 mg daily then recently stopped (not sure if he wean or completely stop).   Recent admission for CP 3/16-3/17 NST large fixed anteroapical defect similar to previous NST 6/17/2024)    EKG ST PAC Qtc 435.  (elevated compared to previous), Troponin negative.  RSV, COVID 19, and Flu negative   CXR CLD similar to previous.     Past Medical History:   Diagnosis Date    Acute hypoxic respiratory failure 09/23/2024    Oxygen therapy 24/7 now, continue 2 L oxygen therapy.      BPH (benign prostatic hyperplasia) 02/28/2024    CAD (coronary artery disease)     Sees [facility], h/o stent    Chronic HFrEF (heart failure with reduced ejection fraction)  "05/01/2024    Diabetes mellitus     Hypertension     Kidney stone     Stroke     age 62    Type 2 diabetes mellitus without complication, without long-term current use of insulin 10/08/2021       Past Surgical History:   Procedure Laterality Date    24 HOUR IMPEDANCE PH MONITORING OF ESOPHAGUS IN PATIENT NOT TAKING ACID REDUCING MEDICATIONS N/A 11/13/2024    Procedure: IMPEDANCE PH STUDY, ESOPHAGEAL, 24 HOUR, IN PATIENT NOT TAKING ACID REDUCING MEDICATION;  Surgeon: Stephany Mendoza MD;  Location: [facility] (4TH FLR);  Service: Endoscopy;  Laterality: N/A;  referral dr miguel/ off ppi / prep inst sent to pt via mail  11/6- precall attempted no answer. Unable to [facility]-  11/8 - pt not called, per chart review patient currently inpatient at [place] [id number]    CARDIAC CATHETERIZATION      with stent    COLONOSCOPY N/A 03/27/2024    Procedure: COLONOSCOPY;  Surgeon: Ariela Castro MD;  Location: [facility];  Service: Endoscopy;  Laterality: N/A;    ESOPHAGEAL MANOMETRY WITH MEASUREMENT OF IMPEDANCE N/A 11/13/2024    Procedure: MANOMETRY, ESOPHAGUS, WITH IMPEDANCE MEASUREMENT;  Surgeon: Stephany Mendoza MD;  Location: [facility] (4TH FLR);  Service: Endoscopy;  Laterality: N/A;    ESOPHAGOGASTRODUODENOSCOPY N/A 03/27/2024    Procedure: EGD (ESOPHAGOGASTRODUODENOSCOPY);  Surgeon: Ariela Castro MD;  Location: [facility] ENDO;  Service: Endoscopy;  Laterality: N/A;    LEFT HEART CATHETERIZATION Left 9/10/2024    Procedure: Left heart cath;  Surgeon: Jemal Drummond MD;  Location: [facility] CATH LAB;  Service: Cardiology;  Laterality: Left;    SHOULDER SURGERY Right        Review of patient's allergies indicates:   Allergen Reactions    Dapagliflozin      Other reaction(s): Other (See Comments)    Pcn [penicillins]     Linagliptin Other (See Comments)     "it knocked me down", "it almost killed me"    Lisinopril Other (See Comments)     cough    Pantoprazole Hives       Current Facility-Administered Medications on " File Prior to Encounter   Medication    [COMPLETED] famotidine tablet 20 mg    [COMPLETED] potassium chloride SA CR tablet 40 mEq    [COMPLETED] sacubitriL-valsartan 24-26 mg per tablet 1 tablet     Current Outpatient Medications on File Prior to Encounter   Medication Sig    albuterol-ipratropium (DUO-NEB) 2.5 mg-0.5 mg/3 mL nebulizer solution Take 3 mLs by nebulization every 6 (six) hours as needed for Wheezing. Rescue    amLODIPine (NORVASC) 10 MG tablet Take 1 tablet (10 mg total) by mouth once daily.    arformoteroL (BROVANA) 15 mcg/2 mL Nebu Take 2 mLs (15 mcg total) by nebulization 2 (two) times daily. Controller    aspirin 81 MG Chew Take 81 mg by mouth once daily.    blood-glucose sensor (FREESTYLE RAMBO 3 PLUS SENSOR) Blanche Change every 15 days    budesonide (PULMICORT) 0.5 mg/2 mL nebulizer solution Take 4 mLs (1 mg total) by nebulization 2 (two) times daily. Controller    cholecalciferol, vitamin D3, (VITAMIN D3) 50 mcg (2,000 unit) Cap capsule Take 1 capsule by mouth once daily.    famotidine (PEPCID) 20 MG tablet Take 1 tablet (20 mg total) by mouth 2 (two) times daily as needed for Heartburn.    furosemide (LASIX) 20 MG tablet Take 1 tablet (20 mg total) by mouth once daily.    glimepiride (AMARYL) 4 MG tablet Take 1 tablet (4 mg total) by mouth before breakfast.    insulin aspart U-100 (NOVOLOG FLEXPEN U-100 INSULIN) 100 unit/mL (3 mL) InPn pen Inject as needed before meals: 180-230=+1, 231-280=+2, 281-330=+3, 331-380=+4, over 380=+5 units    latanoprost, PF, 0.005 % Drop 1 drop into affected eye in the evening Ophthalmic Once a day    metFORMIN (GLUCOPHAGE-XR) 500 MG ER 24hr tablet Take 1 tablet (500 mg total) by mouth 2 (two) times daily with meals.    metoprolol succinate (TOPROL-XL) 50 MG 24 hr tablet Take 1 tablet (50 mg total) by mouth once daily.    mirabegron (MYRBETRIQ) 25 mg Tb24 ER tablet Take 1 tablet (25 mg total) by mouth once daily.    mycophenolate (CELLCEPT) 250 mg Cap Take 4 capsules  "(1,000 mg total) by mouth 2 (two) times daily.    nintedanib (OFEV) 150 mg Cap Take 1 capsule (150 mg total) by mouth 2 (two) times a day.    nitroGLYCERIN (NITROSTAT) 0.3 MG SL tablet Place 1 tablet (0.3 mg total) under the tongue every 5 (five) minutes as needed for Chest pain.    omeprazole (PRILOSEC) 40 MG capsule Take 40 mg by mouth once daily.    ONETOUCH DELICA PLUS LANCET 33 gauge Misc Apply topically 3 (three) times daily.    pen needle, diabetic 32 gauge x 5/32" Ndle 1 each by Misc.(Non-Drug; Combo Route) route once daily.    predniSONE (DELTASONE) 20 MG tablet Take 2 tablets (40 mg total) by mouth once daily for 30 days, THEN 1 tablet (20 mg total) once daily for 30 days, THEN 0.5 tablets (10 mg total) once daily.    rosuvastatin (CRESTOR) 20 MG tablet Take 1 tablet (20 mg total) by mouth once daily.    sacubitriL-valsartan (ENTRESTO) 24-26 mg per tablet Take 1 tablet by mouth 2 (two) times daily.    sucralfate (CARAFATE) 1 gram tablet Take 1 tablet (1 g total) by mouth 4 (four) times daily before meals and nightly.    sulfamethoxazole-trimethoprim 800-160mg (BACTRIM DS) 800-160 mg Tab Take 1 tablet by mouth once daily.     Family History       Problem Relation (Age of Onset)    Cancer Mother    Colon cancer Sister          Tobacco Use    Smoking status: Never     Passive exposure: Never    Smokeless tobacco: Never   Substance and Sexual Activity    Alcohol use: No    Drug use: Never    Sexual activity: Not Currently     Review of Systems   Constitutional:  Positive for activity change and fatigue.   HENT:  Positive for sore throat. Negative for trouble swallowing.    Eyes: Negative.    Respiratory:  Positive for cough and shortness of breath. Negative for wheezing.    Cardiovascular: Negative.    Gastrointestinal: Negative.    Genitourinary: Negative.    Musculoskeletal: Negative.    Neurological: Negative.    Hematological: Negative.    Psychiatric/Behavioral: Negative.       Objective:     Vital Signs " (Most Recent):  Temp: 98.5 °F (36.9 °C) (03/23/25 0918)  Pulse: 93 (03/23/25 1506)  Resp: 20 (03/23/25 1429)  BP: (!) 152/103 (03/23/25 1442)  SpO2: 97 % (03/23/25 1506) Vital Signs (24h Range):  Temp:  [97.5 °F (36.4 °C)-98.5 °F (36.9 °C)] 98.5 °F (36.9 °C)  Pulse:  [] 93  Resp:  [16-34] 20  SpO2:  [85 %-100 %] 97 %  BP: (134-160)/() 152/103     Weight: 59 kg (130 lb)  Body mass index is 19.77 kg/m².     Physical Exam  Constitutional:       Appearance: Normal appearance. He is ill-appearing.   HENT:      Head: Atraumatic.      Mouth/Throat:      Mouth: Mucous membranes are dry.   Eyes:      Extraocular Movements: Extraocular movements intact.   Cardiovascular:      Rate and Rhythm: Tachycardia present.      Pulses: Normal pulses.      Heart sounds: Normal heart sounds.   Pulmonary:      Breath sounds: Decreased air movement present.      Comments: Crackles   Speaking in full sentences    Abdominal:      Palpations: Abdomen is soft.   Musculoskeletal:      Right lower leg: No edema.      Left lower leg: No edema.   Skin:     General: Skin is warm and dry.   Neurological:      General: No focal deficit present.      Mental Status: He is oriented to person, place, and time. Mental status is at baseline.                Significant Labs: All pertinent labs within the past 24 hours have been reviewed.    Significant Imaging: I have reviewed all pertinent imaging results/findings within the past 24 hours.  Assessment/Plan:     * Interstitial lung disease exacerbation  Patient presents to ED for worsen shortness of breath while walking down step to go store this am. He is no longer taking prednisone but not sure if wean or not. He is compliance with cellcept AKILA  Reviewed Dr. Rivas clinic note from last month and plan was to wean prednisone   EKG ST , BNP elevated   98% on 3 L NC in ED  Continue Bactrim for PCP prophylaxis  Continue Cellcept   Brother to home OFEV  Start prednisone 40 mg then taper,  schedule duoneb/steroid neb  Lasix 40 mg IV BID  If no improvement, will consult Pulmonologist       Acute on chronic respiratory failure with hypoxia  See above #1    IPF (idiopathic pulmonary fibrosis)  See above #1      Chronic HFrEF (heart failure with reduced ejection fraction)  Results for orders placed during the hospital encounter of 03/16/25  Echo  Interpretation Summary    Left Ventricle: The left ventricle is normal in size. There is mildly reduced systolic function with a visually estimated ejection fraction of 40 - 45%.    Right Ventricle: The right ventricle is normal in size. Systolic function is normal.    Aortic Valve: There is mild aortic valve sclerosis.    Mitral Valve: There is moderate mitral annular calcification.    Aorta: Aortic root is mildly dilated measuring 3.73 cm.    Pulmonary Artery: The estimated pulmonary artery systolic pressure is 30 mmHg.    IVC/SVC: Normal venous pressure at 3 mmHg.  BNP elevated compared to prior  Lasix 40 mg BID  Continue GDMT as tolerated-entresto, metoprolol        Essential hypertension  Patient's blood pressure range in the last 24 hours was: BP  Min: 134/91  Max: 160/105.The patient's inpatient anti-hypertensive regimen is listed below:  Current Antihypertensives  amLODIPine tablet 10 mg, Daily, Oral  metoprolol succinate (TOPROL-XL) 24 hr tablet 50 mg, Daily, Oral        GERD (gastroesophageal reflux disease)  Protonix -hives??  Continue Pecid  Aspiration precaution         Coronary artery disease involving native coronary artery of native heart with angina pectoris  Patient with known CAD s/p stent placement, which is controlled Will continue ASA and Statin and monitor for S/Sx of angina/ACS. Continue to monitor on telemetry.     Type 2 diabetes mellitus without complication, without long-term current use of insulin  Lab Results   Component Value Date    HGBA1C 7.8 (H) 01/02/2025   SSI and adjust          VTE Risk Mitigation (From admission, onward)            Ordered     IP VTE HIGH RISK PATIENT  Once         03/23/25 1416     Place sequential compression device  Until discontinued         03/23/25 1416                     On 03/23/2025, patient should be placed in hospital observation services under my care in collaboration with Macario Montgomery MD.           Suzan Calderon NP  Department of Hospital Medicine  [facility] - Emergency Dept

## 2025-03-24 ENCOUNTER — TELEPHONE (OUTPATIENT)
Dept: PULMONOLOGY | Facility: CLINIC | Age: 75
End: 2025-03-24
Payer: MEDICARE

## 2025-03-24 ENCOUNTER — DOCUMENTATION ONLY (OUTPATIENT)
Facility: CLINIC | Age: 75
End: 2025-03-24
Payer: MEDICARE

## 2025-03-24 DIAGNOSIS — R06.02 SOB (SHORTNESS OF BREATH): Primary | ICD-10-CM

## 2025-03-24 LAB
ABSOLUTE EOSINOPHIL (OHS): 0.01 K/UL
ABSOLUTE MONOCYTE (OHS): 0.57 K/UL (ref 0.3–1)
ABSOLUTE NEUTROPHIL COUNT (OHS): 4.17 K/UL (ref 1.8–7.7)
ALBUMIN SERPL BCP-MCNC: 3.1 G/DL (ref 3.5–5.2)
ALP SERPL-CCNC: 76 UNIT/L (ref 40–150)
ALT SERPL W/O P-5'-P-CCNC: 32 UNIT/L (ref 10–44)
ANION GAP (OHS): 11 MMOL/L (ref 8–16)
AST SERPL-CCNC: 15 UNIT/L (ref 11–45)
BASOPHILS # BLD AUTO: 0.01 K/UL
BASOPHILS NFR BLD AUTO: 0.2 %
BILIRUB SERPL-MCNC: 0.3 MG/DL (ref 0.1–1)
BUN SERPL-MCNC: 17 MG/DL (ref 8–23)
CALCIUM SERPL-MCNC: 9.4 MG/DL (ref 8.7–10.5)
CHLORIDE SERPL-SCNC: 100 MMOL/L (ref 95–110)
CO2 SERPL-SCNC: 25 MMOL/L (ref 23–29)
CREAT SERPL-MCNC: 0.8 MG/DL (ref 0.5–1.4)
ERYTHROCYTE [DISTWIDTH] IN BLOOD BY AUTOMATED COUNT: 14.1 % (ref 11.5–14.5)
GFR SERPLBLD CREATININE-BSD FMLA CKD-EPI: >60 ML/MIN/1.73/M2
GLUCOSE SERPL-MCNC: 201 MG/DL (ref 70–110)
HCT VFR BLD AUTO: 39.7 % (ref 40–54)
HGB BLD-MCNC: 13.7 GM/DL (ref 14–18)
IMM GRANULOCYTES # BLD AUTO: 0.02 K/UL (ref 0–0.04)
IMM GRANULOCYTES NFR BLD AUTO: 0.3 % (ref 0–0.5)
LYMPHOCYTES # BLD AUTO: 1.2 K/UL (ref 1–4.8)
MAGNESIUM SERPL-MCNC: 1.9 MG/DL (ref 1.6–2.6)
MCH RBC QN AUTO: 27.9 PG (ref 27–50)
MCHC RBC AUTO-ENTMCNC: 34.5 G/DL (ref 32–36)
MCV RBC AUTO: 81 FL (ref 82–98)
NUCLEATED RBC (/100WBC) (OHS): 0 /100 WBC
OHS QRS DURATION: 82 MS
OHS QTC CALCULATION: 437 MS
PLATELET # BLD AUTO: 256 K/UL (ref 150–450)
PMV BLD AUTO: 9.3 FL (ref 9.2–12.9)
POCT GLUCOSE: 202 MG/DL (ref 70–110)
POCT GLUCOSE: 407 MG/DL (ref 70–110)
POCT GLUCOSE: 436 MG/DL (ref 70–110)
POCT GLUCOSE: 436 MG/DL (ref 70–110)
POCT GLUCOSE: 68 MG/DL (ref 70–110)
POTASSIUM SERPL-SCNC: 3.8 MMOL/L (ref 3.5–5.1)
PROT SERPL-MCNC: 7.1 GM/DL (ref 6–8.4)
RBC # BLD AUTO: 4.91 M/UL (ref 4.6–6.2)
RELATIVE EOSINOPHIL (OHS): 0.2 %
RELATIVE LYMPHOCYTE (OHS): 20.1 % (ref 18–48)
RELATIVE MONOCYTE (OHS): 9.5 % (ref 4–15)
RELATIVE NEUTROPHIL (OHS): 69.7 % (ref 38–73)
SODIUM SERPL-SCNC: 136 MMOL/L (ref 136–145)
WBC # BLD AUTO: 5.98 K/UL (ref 3.9–12.7)

## 2025-03-24 PROCEDURE — 25000242 PHARM REV CODE 250 ALT 637 W/ HCPCS: Mod: HCNC | Performed by: NURSE PRACTITIONER

## 2025-03-24 PROCEDURE — 99900035 HC TECH TIME PER 15 MIN (STAT): Mod: HCNC

## 2025-03-24 PROCEDURE — 94640 AIRWAY INHALATION TREATMENT: CPT | Mod: HCNC

## 2025-03-24 PROCEDURE — 83735 ASSAY OF MAGNESIUM: CPT | Mod: HCNC | Performed by: NURSE PRACTITIONER

## 2025-03-24 PROCEDURE — 25000003 PHARM REV CODE 250: Mod: HCNC | Performed by: NURSE PRACTITIONER

## 2025-03-24 PROCEDURE — 25000003 PHARM REV CODE 250: Mod: HCNC | Performed by: HOSPITALIST

## 2025-03-24 PROCEDURE — 63600175 PHARM REV CODE 636 W HCPCS: Mod: HCNC | Performed by: NURSE PRACTITIONER

## 2025-03-24 PROCEDURE — 80053 COMPREHEN METABOLIC PANEL: CPT | Mod: HCNC | Performed by: NURSE PRACTITIONER

## 2025-03-24 PROCEDURE — 96372 THER/PROPH/DIAG INJ SC/IM: CPT | Performed by: NURSE PRACTITIONER

## 2025-03-24 PROCEDURE — 36415 COLL VENOUS BLD VENIPUNCTURE: CPT | Mod: HCNC | Performed by: NURSE PRACTITIONER

## 2025-03-24 PROCEDURE — 27000221 HC OXYGEN, UP TO 24 HOURS: Mod: HCNC

## 2025-03-24 PROCEDURE — 85025 COMPLETE CBC W/AUTO DIFF WBC: CPT | Mod: HCNC | Performed by: NURSE PRACTITIONER

## 2025-03-24 PROCEDURE — 96376 TX/PRO/DX INJ SAME DRUG ADON: CPT

## 2025-03-24 PROCEDURE — 94761 N-INVAS EAR/PLS OXIMETRY MLT: CPT | Mod: HCNC

## 2025-03-24 PROCEDURE — 11000001 HC ACUTE MED/SURG PRIVATE ROOM: Mod: HCNC

## 2025-03-24 RX ORDER — IPRATROPIUM BROMIDE AND ALBUTEROL SULFATE 2.5; .5 MG/3ML; MG/3ML
3 SOLUTION RESPIRATORY (INHALATION) EVERY 4 HOURS
Status: ON HOLD | COMMUNITY
Start: 2025-03-24 | End: 2026-03-24

## 2025-03-24 RX ORDER — PREDNISONE 10 MG/1
TABLET ORAL
Qty: 38 TABLET | Refills: 0 | Status: ON HOLD | OUTPATIENT
Start: 2025-03-25 | End: 2025-04-14

## 2025-03-24 RX ORDER — INSULIN ASPART 100 [IU]/ML
5 INJECTION, SOLUTION INTRAVENOUS; SUBCUTANEOUS
Status: DISCONTINUED | OUTPATIENT
Start: 2025-03-24 | End: 2025-03-24

## 2025-03-24 RX ORDER — IPRATROPIUM BROMIDE AND ALBUTEROL SULFATE 2.5; .5 MG/3ML; MG/3ML
3 SOLUTION RESPIRATORY (INHALATION)
Status: DISCONTINUED | OUTPATIENT
Start: 2025-03-24 | End: 2025-03-25 | Stop reason: HOSPADM

## 2025-03-24 RX ORDER — FUROSEMIDE 20 MG/1
20 TABLET ORAL DAILY
Status: DISCONTINUED | OUTPATIENT
Start: 2025-03-25 | End: 2025-03-25 | Stop reason: HOSPADM

## 2025-03-24 RX ORDER — SIMETHICONE 80 MG
80 TABLET,CHEWABLE ORAL 3 TIMES DAILY PRN
Qty: 30 TABLET | Refills: 0 | Status: ON HOLD | OUTPATIENT
Start: 2025-03-24

## 2025-03-24 RX ORDER — GUAIFENESIN 600 MG/1
1200 TABLET, EXTENDED RELEASE ORAL 2 TIMES DAILY
Qty: 60 TABLET | Refills: 1 | Status: SHIPPED | OUTPATIENT
Start: 2025-03-24 | End: 2025-03-25 | Stop reason: HOSPADM

## 2025-03-24 RX ORDER — INSULIN GLARGINE 100 [IU]/ML
15 INJECTION, SOLUTION SUBCUTANEOUS NIGHTLY
Status: DISCONTINUED | OUTPATIENT
Start: 2025-03-24 | End: 2025-03-25 | Stop reason: HOSPADM

## 2025-03-24 RX ORDER — INSULIN GLARGINE 100 [IU]/ML
8 INJECTION, SOLUTION SUBCUTANEOUS NIGHTLY
Status: DISCONTINUED | OUTPATIENT
Start: 2025-03-24 | End: 2025-03-24

## 2025-03-24 RX ORDER — INSULIN ASPART 100 [IU]/ML
8 INJECTION, SOLUTION INTRAVENOUS; SUBCUTANEOUS
Status: DISCONTINUED | OUTPATIENT
Start: 2025-03-24 | End: 2025-03-25 | Stop reason: HOSPADM

## 2025-03-24 RX ADMIN — BENZONATATE 200 MG: 100 CAPSULE ORAL at 06:03

## 2025-03-24 RX ADMIN — SUCRALFATE 1 G: 1 TABLET ORAL at 08:03

## 2025-03-24 RX ADMIN — IPRATROPIUM BROMIDE AND ALBUTEROL SULFATE 3 ML: 2.5; .5 SOLUTION RESPIRATORY (INHALATION) at 08:03

## 2025-03-24 RX ADMIN — SULFAMETHOXAZOLE AND TRIMETHOPRIM 1 TABLET: 800; 160 TABLET ORAL at 08:03

## 2025-03-24 RX ADMIN — SUCRALFATE 1 G: 1 TABLET ORAL at 10:03

## 2025-03-24 RX ADMIN — SUCRALFATE 1 G: 1 TABLET ORAL at 04:03

## 2025-03-24 RX ADMIN — SACUBITRIL AND VALSARTAN 1 TABLET: 24; 26 TABLET, FILM COATED ORAL at 08:03

## 2025-03-24 RX ADMIN — FAMOTIDINE 20 MG: 20 TABLET, FILM COATED ORAL at 08:03

## 2025-03-24 RX ADMIN — MYCOPHENOLATE MOFETIL 1000 MG: 250 CAPSULE ORAL at 08:03

## 2025-03-24 RX ADMIN — SUCRALFATE 1 G: 1 TABLET ORAL at 06:03

## 2025-03-24 RX ADMIN — IPRATROPIUM BROMIDE AND ALBUTEROL SULFATE 3 ML: 2.5; .5 SOLUTION RESPIRATORY (INHALATION) at 01:03

## 2025-03-24 RX ADMIN — BENZONATATE 200 MG: 100 CAPSULE ORAL at 08:03

## 2025-03-24 RX ADMIN — SIMETHICONE 80 MG: 80 TABLET, CHEWABLE ORAL at 04:03

## 2025-03-24 RX ADMIN — ACETAMINOPHEN 650 MG: 325 TABLET ORAL at 04:03

## 2025-03-24 RX ADMIN — INSULIN ASPART 2 UNITS: 100 INJECTION, SOLUTION INTRAVENOUS; SUBCUTANEOUS at 08:03

## 2025-03-24 RX ADMIN — MYCOPHENOLATE MOFETIL 1000 MG: 250 CAPSULE ORAL at 09:03

## 2025-03-24 RX ADMIN — BENZONATATE 200 MG: 100 CAPSULE ORAL at 10:03

## 2025-03-24 RX ADMIN — ATORVASTATIN CALCIUM 40 MG: 40 TABLET, FILM COATED ORAL at 08:03

## 2025-03-24 RX ADMIN — PREDNISONE 40 MG: 20 TABLET ORAL at 08:03

## 2025-03-24 RX ADMIN — GUAIFENESIN 1200 MG: 600 TABLET, EXTENDED RELEASE ORAL at 08:03

## 2025-03-24 RX ADMIN — IPRATROPIUM BROMIDE AND ALBUTEROL SULFATE 3 ML: 2.5; .5 SOLUTION RESPIRATORY (INHALATION) at 03:03

## 2025-03-24 RX ADMIN — INSULIN ASPART 5 UNITS: 100 INJECTION, SOLUTION INTRAVENOUS; SUBCUTANEOUS at 04:03

## 2025-03-24 RX ADMIN — ASPIRIN 81 MG CHEWABLE TABLET 81 MG: 81 TABLET CHEWABLE at 08:03

## 2025-03-24 RX ADMIN — INSULIN ASPART 8 UNITS: 100 INJECTION, SOLUTION INTRAVENOUS; SUBCUTANEOUS at 04:03

## 2025-03-24 RX ADMIN — INSULIN ASPART 5 UNITS: 100 INJECTION, SOLUTION INTRAVENOUS; SUBCUTANEOUS at 12:03

## 2025-03-24 RX ADMIN — INSULIN GLARGINE 15 UNITS: 100 INJECTION, SOLUTION SUBCUTANEOUS at 08:03

## 2025-03-24 RX ADMIN — BUDESONIDE 1 MG: 0.5 INHALANT RESPIRATORY (INHALATION) at 07:03

## 2025-03-24 RX ADMIN — Medication 6 MG: at 08:03

## 2025-03-24 RX ADMIN — BUDESONIDE 1 MG: 0.5 INHALANT RESPIRATORY (INHALATION) at 08:03

## 2025-03-24 RX ADMIN — INSULIN ASPART 5 UNITS: 100 INJECTION, SOLUTION INTRAVENOUS; SUBCUTANEOUS at 11:03

## 2025-03-24 RX ADMIN — IPRATROPIUM BROMIDE AND ALBUTEROL SULFATE 3 ML: 2.5; .5 SOLUTION RESPIRATORY (INHALATION) at 07:03

## 2025-03-24 RX ADMIN — FUROSEMIDE 40 MG: 10 INJECTION, SOLUTION INTRAVENOUS at 08:03

## 2025-03-24 NOTE — NURSING
[facility], [facility]  Nurses Note -- 4 Eyes      3/23/2025       Skin assessed on: Q Shift      [x] No Pressure Injuries Present    []Prevention Measures Documented    [] Yes LDA  for Pressure Injury Previously documented     [] Yes New Pressure Injury Discovered   [] LDA for New Pressure Injury Added      Attending RN:  Salima Sahni LPN     Second RN:  Yen Fernández RN

## 2025-03-24 NOTE — SUBJECTIVE & OBJECTIVE
Interval History: [place]. Ambulating in room. Worried steroid causing his high blood sugars. Discuss will adjust insulin and blood sugars will improve once taper off steroid.   Worried about his malfunction portable oxygen. He likes to walk around and be out of the house.     Review of Systems   Constitutional:  Positive for activity change and fatigue.   HENT:  Negative for trouble swallowing. Sore throat: improving.   Eyes: Negative.    Respiratory:  Positive for cough (better today) and shortness of breath. Negative for wheezing.    Cardiovascular: Negative.    Gastrointestinal: Negative.    Genitourinary: Negative.    Musculoskeletal: Negative.    Neurological: Negative.    Hematological: Negative.    Psychiatric/Behavioral: Negative.       Objective:     Vital Signs (Most Recent):  Temp: 97.6 °F (36.4 °C) (03/24/25 1110)  Pulse: 82 (03/24/25 1110)  Resp: 18 (03/24/25 1110)  BP: (!) 147/76 (03/24/25 1110)  SpO2: 100 % (03/24/25 1110) Vital Signs (24h Range):  Temp:  [97.6 °F (36.4 °C)-99.5 °F (37.5 °C)] 97.6 °F (36.4 °C)  Pulse:  [] 82  Resp:  [15-20] 18  SpO2:  [94 %-100 %] 100 %  BP: (112-162)/() 147/76     Weight: 56.7 kg (125 lb)  Body mass index is 19.01 kg/m².    Intake/Output Summary (Last 24 hours) at 3/24/2025 1159  Last data filed at 3/23/2025 2202  Gross per 24 hour   Intake --   Output 150 ml   Net -150 ml         Physical Exam  Constitutional:       Appearance: Normal appearance. He is ill-appearing.   HENT:      Head: Atraumatic.      Mouth/Throat:      Mouth: Mucous membranes are dry.   Eyes:      Extraocular Movements: Extraocular movements intact.   Cardiovascular:      Rate and Rhythm: Normal rate.      Pulses: Normal pulses.      Heart sounds: Normal heart sounds.   Pulmonary:      Breath sounds: Decreased air movement present.      Comments: Crackles   Speaking in full sentences    Abdominal:      Palpations: Abdomen is soft.   Musculoskeletal:      Right lower leg: No edema.       Left lower leg: No edema.   Skin:     General: Skin is warm and dry.   Neurological:      General: No focal deficit present.      Mental Status: He is oriented to person, place, and time. Mental status is at baseline.               Significant Labs: All pertinent labs within the past 24 hours have been reviewed.    Significant Imaging: I have reviewed all pertinent imaging results/findings within the past 24 hours.

## 2025-03-24 NOTE — DISCHARGE INSTRUCTIONS
Rapid (short acting) Insulin Instructions     There are many types of insulin that can be prescribed. You are prescribed rapid and long acting insulin to manage your diabetes. It is very important to take these medications exactly how they are prescribed!     Rapid(short)-Acting Insulin: This insulin is taken ONLY at mealtimes!   Insulin Names: Humalog, Novolog, Apidra, Fiasp, Admelog, Lyumjev      ALWAYS TEST YOUR BLOOD GLUCOSE BEFORE eating and BEFORE going to bed.   Eat within 15 minutes of taking the meal insulin shot.    If you skip a meal; skip the meal insulin shot.    Wait at least 4 hours or more between meals and shots.    Inject as needed before meals:   180-230=+1  231-280=+2   281-330=+3   331-380=+4   over 380=+5 units        Our goal at [facility] is to always give you outstanding care and exceptional service. You may receive a survey from [employer] by mail, text or e-mail in the next 24-48 hours asking about the care you received with us. The survey should only take 5-10 minutes to complete and is very important to us.     Your feedback provides us with a way to recognize our staff who work tirelessly to provide the best care! Also, your responses help us learn how to improve when your experience was below our aspiration of excellence. We are always looking for ways to improve your stay. We WILL use your feedback to continue making improvements to help us provide the highest quality care. We keep your personal information and feedback confidential. We appreciate your time completing this survey and can't wait to hear from you!!!    We look forward to your continued care with us! Thanks so much for choosing [facility] for your healthcare needs!

## 2025-03-24 NOTE — PLAN OF CARE
Inpatient Upgrade Note    Emmanuel Grant has warranted treatment spanning two or more midnights of hospital level care for the management of  Interstitial lung disease exacerbation, Acute on chronic respiratory failure with hypoxia . He continues to require IV diuresis, daily labs, supplemental oxygen, medication adjustments, and further evaluation by consultants. His condition is also complicated by the following comorbidities:   hypertenion, hyperlipidemia, DMT2, CAD sp stents , HFrEF EF 40-45%, DMT2, chronic hypoxia respiratory failure on home oxygen due to  ILD/IPF on immunosupression therapy (cellcept 1 g BID and OFEV 150 mg BID,  Bactrim daily) on home oxygen 3 L NC  .

## 2025-03-24 NOTE — TELEPHONE ENCOUNTER
----- Message from  [employer] sent at 3/23/2025  1:18 PM CDT -----  Regarding: Additional information  Additional information:  [facility] technician recommended thefollowing type of concentrator for patient:   Kan O2 Concentrator which is portable and can be either pulse or continuous dose.Steph Solorzano, MSW, [facility] [place], [phone]

## 2025-03-24 NOTE — PROGRESS NOTES
"[facility]) nurse navigator notified of HFTCC candidate in need of education and introduction to 4-6 week program.      PT aao x 3 while sitting on side of  bed 302  Introduced self to pt as HFTCC nurse navigator Jackie CHAU    Patient given "[facility] Home Care Guide" which includes "Daily weight and symptom tracker".  Encouraged pt and caregiver to review information.      Reviewed the following key points of HFTCC program with pt and family:   1.) Take your medications as directed.    2.) Weight yourself daily   3.) Follow low salt and limited fluid diet.    4.) Stop smoking and start exercising   5.) Go to your appointments and call your team.      Pt reminded to follow Symptom tracker and to call at the onset of symptoms according to tracker.     Reviewed plan for follow up once discharged to include phone calls, in person and virtual visits to assist pt optimizing their heart failure medication regimen and encouraging healthy lifestyle modifications.  Reminded pt that program will assist them over the next 4-6 weeks and then patient will be transferred to long term care provider .  Reminded pt how to contact HFTCC navigator via phone and or via [employer].     Pt instructed appointment will be printed on hospital discharge paperwork.     Pt also reminded RN will call 48-72 hours after discharge to check on them.     PT  verbalize read back of information given.  Encouraged pt and family to read over information often and contact team with any questions or concerns.     "

## 2025-03-24 NOTE — ASSESSMENT & PLAN NOTE
Lab Results   Component Value Date    HGBA1C 7.8 (H) 01/02/2025   Add meal time insulin, continue SSI

## 2025-03-24 NOTE — TELEPHONE ENCOUNTER
Patient is admitted at [facility]. Patient is scheduled with Dr. Schmitt first available appointment on 4/21/25 at 11 am. Patient is added to the wait list.

## 2025-03-24 NOTE — ASSESSMENT & PLAN NOTE
Patient presents to ED for worsen shortness of breath while walking down step to go store this am. He is no longer taking prednisone but not sure if wean or not. He is compliance with cellcept , AKILA  Reviewed Dr. Rivas clinic note from last month and plan was to wean prednisone   EKG ST , BNP elevated   98% on 3 L NC in ED  Continue Bactrim for PCP prophylaxis  Continue Cellcept   Brother to home OFEV  Start prednisone 40 mg then taper, schedule duoneb/steroid neb  Continue Lasix 40 mg IV BID again today then switch to PO tomorrow

## 2025-03-24 NOTE — NURSING
O2 sats - 6 minute walk test    RA at rest = 91%,  HR 85    RA ambulation = 87%, , patient c/o SOB    2L O2 ambulation = 89%    3L O2 nc ambulation = 93%

## 2025-03-24 NOTE — PLAN OF CARE
"[facility] [place] - Telemetry      HOME HEALTH ORDERS  FACE TO FACE ENCOUNTER    Patient Name: Emmanuel Grant  YOB: 1950    PCP: Wilfredo De Souza MD   PCP Address: [address]  PCP Phone Number: [phone]  PCP Fax: [phone]    Encounter Date: 3/23/25    Admit to Home Health    Diagnoses:  Active Hospital Problems    Diagnosis  POA    *Interstitial lung disease exacerbation [J84.9]  Yes     Priority: 1 - High    Acute on chronic respiratory failure with hypoxia [J96.21]  Yes     Priority: 2     IPF (idiopathic pulmonary fibrosis) [J84.112]  Yes    Chronic HFrEF (heart failure with reduced ejection fraction) [I50.22]  Yes    GERD (gastroesophageal reflux disease) [K21.9]  Yes    Coronary artery disease involving native coronary artery of native heart with angina pectoris [I25.119]  Yes    Type 2 diabetes mellitus without complication, without long-term current use of insulin [E11.9]  Yes    Essential hypertension [I10]  Yes      Resolved Hospital Problems   No resolved problems to display.       Follow Up Appointments:  Future Appointments   Date Time Provider Department Center   3/26/2025  1:00 PM Wilfredo De Souza MD [facility] - B   3/27/2025  1:15 PM Johnnie Musa, OD [facility] OPTOMTY [place]   4/9/2025 12:30 PM Malika Calderon NP [facility] ENDOCRN [facility]   4/9/2025  2:00 PM Karyn Hagen MD [facility] [place]   4/17/2025  1:30 PM Samira Palma MD [facility] [place]   4/28/2025 10:20 AM Wilfredo De Souza MD [facility] - B   5/6/2025 10:15 AM Michael Parham MD [facility]       Allergies:  Review of patient's allergies indicates:   Allergen Reactions    Dapagliflozin      Other reaction(s): Other (See Comments)    Pcn [penicillins]     Linagliptin Other (See Comments)     "it knocked me down", "it almost killed me"    Lisinopril Other (See Comments)     cough    Pantoprazole Hives       Medications: Review discharge medications " "with patient and family and provide education.    Current Medications[1]     Medication List        START taking these medications      guaiFENesin 600 mg 12 hr tablet  Commonly known as: MUCINEX  Take 2 tablets (1,200 mg total) by mouth 2 (two) times daily.     simethicone 80 MG chewable tablet  Commonly known as: MYLICON  Take 1 tablet (80 mg total) by mouth 3 (three) times daily as needed for Flatulence.            CHANGE how you take these medications      albuterol-ipratropium 2.5 mg-0.5 mg/3 mL nebulizer solution  Commonly known as: DUO-NEB  Take 3 mLs by nebulization every 4 (four) hours. Rescue  What changed:   when to take this  reasons to take this     predniSONE 10 MG tablet  Commonly known as: DELTASONE  Take 4 tablets (40 mg total) by mouth once daily for 5 days, THEN 2 tablets (20 mg total) once daily for 5 days, THEN 1 tablet (10 mg total) once daily for 5 days, THEN 0.5 tablets (5 mg total) once daily for 5 days. Take 40 mg for 5 days, 20 mg for 5 days and 10 mg for 5 day.  Start taking on: March 25, 2025  What changed:   medication strength  See the new instructions.            CONTINUE taking these medications      amLODIPine 10 MG tablet  Commonly known as: NORVASC  Take 1 tablet (10 mg total) by mouth once daily.     arformoteroL 15 mcg/2 mL Nebu  Commonly known as: BROVANA  Take 2 mLs (15 mcg total) by nebulization 2 (two) times daily. Controller     aspirin 81 MG Chew  Take 81 mg by mouth once daily.     BD ULTRA-FINE HEATHER PEN NEEDLE 32 gauge x 5/32" Ndle  Generic drug: pen needle, diabetic  1 each by Misc.(Non-Drug; Combo Route) route once daily.     budesonide 0.5 mg/2 mL nebulizer solution  Commonly known as: PULMICORT  Take 4 mLs (1 mg total) by nebulization 2 (two) times daily. Controller     cholecalciferol (vitamin D3) 50 mcg (2,000 unit) Cap capsule  Commonly known as: VITAMIN D3  Take 1 capsule by mouth once daily.     ENTRESTO 24-26 mg per tablet  Generic drug: " sacubitriL-valsartan  Take 1 tablet by mouth 2 (two) times daily.     famotidine 20 MG tablet  Commonly known as: PEPCID  Take 1 tablet (20 mg total) by mouth 2 (two) times daily as needed for Heartburn.     FREESTYLE RAMBO 3 PLUS SENSOR Blanche  Generic drug: blood-glucose sensor  Change every 15 days     furosemide 20 MG tablet  Commonly known as: LASIX  Take 1 tablet (20 mg total) by mouth once daily.     glimepiride 4 MG tablet  Commonly known as: AMARYL  Take 1 tablet (4 mg total) by mouth before breakfast.     insulin aspart U-100 100 unit/mL (3 mL) Inpn pen  Commonly known as: NovoLOG Flexpen U-100 Insulin  Inject as needed before meals: 180-230=+1, 231-280=+2, 281-330=+3, 331-380=+4, over 380=+5 units     latanoprost (PF) 0.005 % Drop  1 drop into affected eye in the evening Ophthalmic Once a day     metFORMIN 500 MG ER 24hr tablet  Commonly known as: GLUCOPHAGE-XR  Take 1 tablet (500 mg total) by mouth 2 (two) times daily with meals.     metoprolol succinate 50 MG 24 hr tablet  Commonly known as: TOPROL-XL  Take 1 tablet (50 mg total) by mouth once daily.     mirabegron 25 mg Tb24 ER tablet  Commonly known as: MYRBETRIQ  Take 1 tablet (25 mg total) by mouth once daily.     mycophenolate 250 mg Cap  Commonly known as: CELLCEPT  Take 4 capsules (1,000 mg total) by mouth 2 (two) times daily.     nitroGLYCERIN 0.3 MG SL tablet  Commonly known as: NITROSTAT  Place 1 tablet (0.3 mg total) under the tongue every 5 (five) minutes as needed for Chest pain.     OFEV 150 mg Cap  Generic drug: nintedanib  Take 1 capsule (150 mg total) by mouth 2 (two) times a day.     omeprazole 40 MG capsule  Commonly known as: PRILOSEC  Take 40 mg by mouth once daily.     ONETOUCH DELICA PLUS LANCET 33 gauge Misc  Generic drug: lancets  Apply topically 3 (three) times daily.     rosuvastatin 20 MG tablet  Commonly known as: CRESTOR  Take 1 tablet (20 mg total) by mouth once daily.     sucralfate 1 gram tablet  Commonly known as:  CARAFATE  Take 1 tablet (1 g total) by mouth 4 (four) times daily before meals and nightly.     sulfamethoxazole-trimethoprim 800-160mg 800-160 mg Tab  Commonly known as: BACTRIM DS  Take 1 tablet by mouth once daily.                I have seen and examined this patient within the last 30 days. My clinical findings that support the need for the home health skilled services and home bound status are the following:no   Weakness/numbness causing balance and gait disturbance due to Weakness/Debility and ILd making it taxing to leave home.  Requiring assistive device to leave home due to unsteady gait caused by  Weakness/Debility.  Patient with medication mismanagement issues requiring home bound status as evidenced by  Poor adherence to medication regimen/dosage.  Medical restrictions requiring assistance of another human to leave home due to  Dyspnea on exertion (SOB).     Diet:   cardiac diet and diabetic diet 2000 calorie        Referrals/ Consults   to evaluate for community resources/long-range planning.  Aide to provide assistance with personal care, ADLs, and vital signs.    Activities:   activity as tolerated    Nursing:   Agency to admit patient within 24 hours of hospital discharge unless specified on physician order or at patient request    SN to complete comprehensive assessment including routine vital signs. Instruct on disease process and s/s of complications to report to MD. Review/verify medication list sent home with the patient at time of discharge  and instruct patient/caregiver as needed. Frequency may be adjusted depending on start of care date.     Skilled nurse to perform up to 3 visits PRN for symptoms related to diagnosis    Notify MD if SBP > 160 or < 90; DBP > 90 or < 50; HR > 120 or < 50; Temp > 101; O2 < 88%; Other:       Ok to schedule additional visits based on staff availability and patient request on consecutive days within the home health episode.    When multiple  disciplines ordered:    Start of Care occurs on Sunday - Wednesday schedule remaining discipline evaluations as ordered on separate consecutive days following the start of care.    Thursday SOC -schedule subsequent evaluations Friday and Monday the following week.     Friday - Saturday SOC - schedule subsequent discipline evaluations on consecutive days starting Monday of the following week.    For all post-discharge communication and subsequent orders please contact patient's primary care physician.   Miscellaneous   Home Oxygen:  Oxygen at 3 L/min nasal canula to be used:  Continuously.    Home Health Aide:  Nursing Three times weekly, Medical Social Work Three times weekly, Respiratory Therapy Three times weekly, and Home Health Aide Three times weekly      I certify that this patient is confined to his home and needs intermittent skilled nursing care.              [1]   Current Facility-Administered Medications   Medication Dose Route Frequency Provider Last Rate Last Admin    acetaminophen tablet 650 mg  650 mg Oral Q6H PRN Suzan Calderon NP        albuterol-ipratropium 2.5 mg-0.5 mg/3 mL nebulizer solution 3 mL  3 mL Nebulization Q6H WAKE Suzan Calderon NP   3 mL at 03/24/25 0738    amLODIPine tablet 10 mg  10 mg Oral Daily Suzan Calderon NP   10 mg at 03/23/25 1442    aspirin chewable tablet 81 mg  81 mg Oral Daily Suzan Calderon NP   81 mg at 03/24/25 0817    atorvastatin tablet 40 mg  40 mg Oral Daily Suzan Calderon NP   40 mg at 03/24/25 0817    benzonatate capsule 200 mg  200 mg Oral TID PRN Suzan Calderon NP   200 mg at 03/24/25 0601    budesonide nebulizer solution 1 mg  1 mg Nebulization BID Suzan Calderon NP   1 mg at 03/24/25 0738    dextrose 50% injection 12.5 g  12.5 g Intravenous PRN Suzan Calderon NP        dextrose 50% injection 25 g  25 g Intravenous PRN Suzan Calderon NP        famotidine tablet 20 mg  20 mg Oral Daily Suzan Calderon NP   20 mg  at 03/24/25 0817    furosemide injection 40 mg  40 mg Intravenous BID Suzan Calderon, NP   40 mg at 03/24/25 0821    glucagon (human recombinant) injection 1 mg  1 mg Intramuscular PRN Suzan Calderon NP        glucose chewable tablet 16 g  16 g Oral PRN Suzan Calderon NP        glucose chewable tablet 24 g  24 g Oral PRN Suzan Calderon NP        guaiFENesin 12 hr tablet 1,200 mg  1,200 mg Oral BID Macario Montgomery MD   1,200 mg at 03/24/25 0818    hydrALAZINE injection 10 mg  10 mg Intravenous Q6H PRN Suzan Calderon NP        hydrOXYzine HCL tablet 25 mg  25 mg Oral TID PRN Suzan Calderon NP   25 mg at 03/23/25 1737    insulin aspart U-100 pen 0-5 Units  0-5 Units Subcutaneous QID (AC + HS) PRN Suzan Calderon NP   2 Units at 03/24/25 0821    melatonin tablet 6 mg  6 mg Oral Nightly PRN Suzan Calderon NP   6 mg at 03/23/25 2045    metoprolol succinate (TOPROL-XL) 24 hr tablet 50 mg  50 mg Oral Daily Suzan Calderon NP   50 mg at 03/23/25 1442    mycophenolate capsule 1,000 mg  1,000 mg Oral BID Suzan Calderon NP   1,000 mg at 03/24/25 0921    ondansetron disintegrating tablet 4 mg  4 mg Oral Q8H PRN Suzan Calderon NP        polyethylene glycol packet 17 g  17 g Oral BID PRN Suzan Calderon, NP        predniSONE tablet 40 mg  40 mg Oral Daily Suzan Calderon, NP   40 mg at 03/24/25 0818    sacubitriL-valsartan 24-26 mg per tablet 1 tablet  1 tablet Oral BID Suzan Calderon, NP   1 tablet at 03/23/25 2045    simethicone chewable tablet 80 mg  1 tablet Oral TID PRN Suzan Calderon NP   80 mg at 03/23/25 1738    sucralfate tablet 1 g  1 g Oral QID (AC & HS) Suzan Calderon NP   1 g at 03/24/25 0601    sulfamethoxazole-trimethoprim 800-160mg per tablet 1 tablet  1 tablet Oral Daily Suzan Calderon NP   1 tablet at 03/24/25 0817

## 2025-03-24 NOTE — PROGRESS NOTES
[facility] Medicine  Progress Note    Patient Name: Emmanuel Grant  MRN: [id number]  Patient Class: OP- Observation   Admission Date: 3/23/2025  Length of Stay: 0 days  Attending Physician: Macario Montgomery MD  Primary Care Provider: Wilfredo De Souza MD        Subjective     Principal Problem:Interstitial lung disease        HPI:  Emmanuel Grant is a 73 yo male with pmhx hypertenion, hyperlipidemia, DMT2, CAD sp stents , HFrEF EF 40-45%, DMT2, chronic hypoxia respiratory failure on home oxygen due to  ILD/IPF on immunosupression therapy (cellcept 1 g BID and OFEV 150 mg BID,  Bactrim daily) on home oxygen 3 L NC who presents again to ED for shortness of breath since yesterday. He have chronic productive cough. There was issues with portable home oxygen yesterday when he want to ED main campus and still issue. He was on his way to store this am but worsen shortness of breath which prompt him to call his brother he brought him to ER. No home treatment prior to admission. Denies fever, chest pain.   Previously on prednisone 60 mg daily then recently stopped (not sure if he wean or completely stop).   Recent admission for CP 3/16-3/17 NST large fixed anteroapical defect similar to previous NST 6/17/2024)    EKG ST PAC Qtc 435.  (elevated compared to previous), Troponin negative.  RSV, COVID 19, and Flu negative   CXR CLD similar to previous.     Overview/Hospital Course:  Admission to hospital for acute and chronic respiratory failure with hypoxia due to interstitial lung disease exacerbation and acute on chronic diastolic heart failure. Patient is a poor historian. Patient stopped prednisone once he was started on OFEU and uncontrolled blood sugar. Restart prednisone and taper off slowly, continue cellcept, OFEV (Brother to bring from home). Inaccurate I&O . Continue IV lasix 40 bid and switch to PO tomorrow.   TN/SW also assisting with portable oxygen.     Interval History: [place]. Ambulating  in room. Worried steroid causing his high blood sugars. Discuss will adjust insulin and blood sugars will improve once taper off steroid.   Worried about his malfunction portable oxygen. He likes to walk around and be out of the house.     Review of Systems   Constitutional:  Positive for activity change and fatigue.   HENT:  Negative for trouble swallowing. Sore throat: improving.   Eyes: Negative.    Respiratory:  Positive for cough (better today) and shortness of breath. Negative for wheezing.    Cardiovascular: Negative.    Gastrointestinal: Negative.    Genitourinary: Negative.    Musculoskeletal: Negative.    Neurological: Negative.    Hematological: Negative.    Psychiatric/Behavioral: Negative.       Objective:     Vital Signs (Most Recent):  Temp: 97.6 °F (36.4 °C) (03/24/25 1110)  Pulse: 82 (03/24/25 1110)  Resp: 18 (03/24/25 1110)  BP: (!) 147/76 (03/24/25 1110)  SpO2: 100 % (03/24/25 1110) Vital Signs (24h Range):  Temp:  [97.6 °F (36.4 °C)-99.5 °F (37.5 °C)] 97.6 °F (36.4 °C)  Pulse:  [] 82  Resp:  [15-20] 18  SpO2:  [94 %-100 %] 100 %  BP: (112-162)/() 147/76     Weight: 56.7 kg (125 lb)  Body mass index is 19.01 kg/m².    Intake/Output Summary (Last 24 hours) at 3/24/2025 1159  Last data filed at 3/23/2025 2202  Gross per 24 hour   Intake --   Output 150 ml   Net -150 ml         Physical Exam  Constitutional:       Appearance: Normal appearance. He is ill-appearing.   HENT:      Head: Atraumatic.      Mouth/Throat:      Mouth: Mucous membranes are dry.   Eyes:      Extraocular Movements: Extraocular movements intact.   Cardiovascular:      Rate and Rhythm: Normal rate.      Pulses: Normal pulses.      Heart sounds: Normal heart sounds.   Pulmonary:      Breath sounds: Decreased air movement present.      Comments: Crackles   Speaking in full sentences    Abdominal:      Palpations: Abdomen is soft.   Musculoskeletal:      Right lower leg: No edema.      Left lower leg: No edema.   Skin:      General: Skin is warm and dry.   Neurological:      General: No focal deficit present.      Mental Status: He is oriented to person, place, and time. Mental status is at baseline.               Significant Labs: All pertinent labs within the past 24 hours have been reviewed.    Significant Imaging: I have reviewed all pertinent imaging results/findings within the past 24 hours.      Assessment & Plan  Interstitial lung disease exacerbation  Patient presents to ED for worsen shortness of breath while walking down step to go store this am. He is no longer taking prednisone but not sure if wean or not. He is compliance with cellcept , OFMATHEW  Reviewed Dr. Rivas clinic note from last month and plan was to wean prednisone   EKG ST , BNP elevated   98% on 3 L NC in ED  Continue Bactrim for PCP prophylaxis  Continue Cellcept   Brother to home OFEV  Start prednisone 40 mg then taper, schedule duoneb/steroid neb  Continue Lasix 40 mg IV BID again today then switch to PO tomorrow       Acute on chronic respiratory failure with hypoxia  See above #1  Type 2 diabetes mellitus without complication, without long-term current use of insulin  Lab Results   Component Value Date    HGBA1C 7.8 (H) 01/02/2025   Add meal time insulin, continue SSI        Coronary artery disease involving native coronary artery of native heart with angina pectoris  Patient with known CAD s/p stent placement, which is controlled Will continue ASA and Statin and monitor for S/Sx of angina/ACS. Continue to monitor on telemetry.   GERD (gastroesophageal reflux disease)  Protonix -hives??  Continue Pecid/Simethicone  Aspiration precaution       Essential hypertension  Patient's blood pressure range in the last 24 hours was: BP  Min: 112/78  Max: 162/94.The patient's inpatient anti-hypertensive regimen is listed below:  Current Antihypertensives  amLODIPine tablet 10 mg, Daily, Oral  metoprolol succinate (TOPROL-XL) 24 hr tablet 50 mg, Daily,  Oral  furosemide injection 40 mg, 2 times daily, Intravenous  hydrALAZINE injection 10 mg, Every 6 hours PRN, Intravenous      Chronic HFrEF (heart failure with reduced ejection fraction)  Results for orders placed during the hospital encounter of 03/16/25  Echo  Interpretation Summary    Left Ventricle: The left ventricle is normal in size. There is mildly reduced systolic function with a visually estimated ejection fraction of 40 - 45%.    Right Ventricle: The right ventricle is normal in size. Systolic function is normal.    Aortic Valve: There is mild aortic valve sclerosis.    Mitral Valve: There is moderate mitral annular calcification.    Aorta: Aortic root is mildly dilated measuring 3.73 cm.    Pulmonary Artery: The estimated pulmonary artery systolic pressure is 30 mmHg.    IVC/SVC: Normal venous pressure at 3 mmHg.  BNP elevated compared to prior  Lasix 40 mg IV BID and switch to PO tomorrow   Continue GDMT as tolerated-entresto, metoprolol      Immunosuppression due to drug therapy  See above #1  IPF (idiopathic pulmonary fibrosis)  See above #1    VTE Risk Mitigation (From admission, onward)           Ordered     IP VTE HIGH RISK PATIENT  Once         03/23/25 1416     Place sequential compression device  Until discontinued         03/23/25 1416                    Discharge Planning   DERIC: 3/24/2025     Code Status: Full Code   Medical Readiness for Discharge Date: 3/24/2025  Discharge Plan A: Home                        Suzan Calderon NP  Department of Hospital Medicine   [facility]

## 2025-03-24 NOTE — ASSESSMENT & PLAN NOTE
Results for orders placed during the hospital encounter of 03/16/25  Echo  Interpretation Summary    Left Ventricle: The left ventricle is normal in size. There is mildly reduced systolic function with a visually estimated ejection fraction of 40 - 45%.    Right Ventricle: The right ventricle is normal in size. Systolic function is normal.    Aortic Valve: There is mild aortic valve sclerosis.    Mitral Valve: There is moderate mitral annular calcification.    Aorta: Aortic root is mildly dilated measuring 3.73 cm.    Pulmonary Artery: The estimated pulmonary artery systolic pressure is 30 mmHg.    IVC/SVC: Normal venous pressure at 3 mmHg.  BNP elevated compared to prior  Lasix 40 mg IV BID and switch to PO tomorrow   Continue GDMT as tolerated-entresto, metoprolol

## 2025-03-24 NOTE — TELEPHONE ENCOUNTER
"----- Message from  Nova sent at 3/23/2025 11:21 AM CDT -----  Regarding: Patent Needs Followup Appointmet as soon as possible  Patient was seen at [facility] and [facility] this weekend.  He needs a new evaluation to address his oxygen needs.  It appears that the "pulse" setting on his oxygen is not sufficient and "continous flow" might be more appropriate.Please contact patient  to schedule an appointment.  (Note:  patient is very anxious).  Patient has two portable e-tanks and a home concentrator.  His portable concentrator with pulse setting appears not to be sufficient.ThanksNova Solorzano, GIUSEPPE, LMSW, [facility] CenterCase [phone]  "

## 2025-03-24 NOTE — HOSPITAL COURSE
Admission to hospital for acute and chronic respiratory failure with hypoxia due to interstitial lung disease exacerbation and acute on chronic diastolic heart failure. Patient is a poor historian. Patient stopped prednisone once he was started on OFEU and uncontrolled blood sugar. Restart prednisone and taper off slowly, continue cellcept, OFEV (Brother to bring from home).   He reports that his breathing has greatly improved and requests discharge.  Accepted for home health.  Order for home oxygen placed.  Prescriptions were sent to pharmacy.  All findings and plan discussed with patient, all questions answered, he verbalizes understanding and agreement.  Discharged home in stable condition.

## 2025-03-24 NOTE — TELEPHONE ENCOUNTER
Attempted to contact patient in regards to scheduling a hospital follow up. No answer. Patient voicemail is full. Will try again later.

## 2025-03-24 NOTE — PLAN OF CARE
Problem: Adult Inpatient Plan of Care  Goal: Optimal Comfort and Wellbeing  Outcome: Progressing     Problem: Coping Ineffective  Goal: Effective Coping  Outcome: Progressing     Problem: Diabetes Comorbidity  Goal: Blood Glucose Level Within Targeted Range  Outcome: Progressing

## 2025-03-25 ENCOUNTER — TELEPHONE (OUTPATIENT)
Dept: PULMONOLOGY | Facility: CLINIC | Age: 75
End: 2025-03-25
Payer: MEDICARE

## 2025-03-25 VITALS
HEART RATE: 90 BPM | WEIGHT: 125 LBS | BODY MASS INDEX: 18.94 KG/M2 | TEMPERATURE: 98 F | DIASTOLIC BLOOD PRESSURE: 78 MMHG | SYSTOLIC BLOOD PRESSURE: 126 MMHG | HEIGHT: 68 IN | OXYGEN SATURATION: 98 % | RESPIRATION RATE: 18 BRPM

## 2025-03-25 LAB
ALBUMIN SERPL BCP-MCNC: 3.3 G/DL (ref 3.5–5.2)
ALP SERPL-CCNC: 74 UNIT/L (ref 40–150)
ALT SERPL W/O P-5'-P-CCNC: 31 UNIT/L (ref 10–44)
ANION GAP (OHS): 12 MMOL/L (ref 8–16)
AST SERPL-CCNC: 19 UNIT/L (ref 11–45)
BILIRUB SERPL-MCNC: 0.3 MG/DL (ref 0.1–1)
BUN SERPL-MCNC: 31 MG/DL (ref 8–23)
CALCIUM SERPL-MCNC: 10 MG/DL (ref 8.7–10.5)
CHLORIDE SERPL-SCNC: 96 MMOL/L (ref 95–110)
CO2 SERPL-SCNC: 25 MMOL/L (ref 23–29)
CREAT SERPL-MCNC: 1.2 MG/DL (ref 0.5–1.4)
GFR SERPLBLD CREATININE-BSD FMLA CKD-EPI: >60 ML/MIN/1.73/M2
GLUCOSE SERPL-MCNC: 213 MG/DL (ref 70–110)
POCT GLUCOSE: 194 MG/DL (ref 70–110)
POCT GLUCOSE: 208 MG/DL (ref 70–110)
POCT GLUCOSE: 327 MG/DL (ref 70–110)
POTASSIUM SERPL-SCNC: 3.7 MMOL/L (ref 3.5–5.1)
PROT SERPL-MCNC: 7.3 GM/DL (ref 6–8.4)
SODIUM SERPL-SCNC: 133 MMOL/L (ref 136–145)

## 2025-03-25 PROCEDURE — 25000003 PHARM REV CODE 250: Mod: HCNC | Performed by: INTERNAL MEDICINE

## 2025-03-25 PROCEDURE — 25000003 PHARM REV CODE 250: Mod: HCNC | Performed by: NURSE PRACTITIONER

## 2025-03-25 PROCEDURE — 94640 AIRWAY INHALATION TREATMENT: CPT | Mod: HCNC

## 2025-03-25 PROCEDURE — 94761 N-INVAS EAR/PLS OXIMETRY MLT: CPT | Mod: HCNC

## 2025-03-25 PROCEDURE — 36415 COLL VENOUS BLD VENIPUNCTURE: CPT | Mod: HCNC | Performed by: NURSE PRACTITIONER

## 2025-03-25 PROCEDURE — 80053 COMPREHEN METABOLIC PANEL: CPT | Mod: HCNC | Performed by: NURSE PRACTITIONER

## 2025-03-25 PROCEDURE — 25000242 PHARM REV CODE 250 ALT 637 W/ HCPCS: Mod: HCNC | Performed by: NURSE PRACTITIONER

## 2025-03-25 PROCEDURE — 25000003 PHARM REV CODE 250: Mod: HCNC | Performed by: HOSPITALIST

## 2025-03-25 PROCEDURE — 27000221 HC OXYGEN, UP TO 24 HOURS: Mod: HCNC

## 2025-03-25 PROCEDURE — 63600175 PHARM REV CODE 636 W HCPCS: Mod: HCNC | Performed by: NURSE PRACTITIONER

## 2025-03-25 RX ORDER — GUAIFENESIN AND DEXTROMETHORPHAN HYDROBROMIDE 10; 100 MG/5ML; MG/5ML
10 SYRUP ORAL EVERY 4 HOURS PRN
Status: DISCONTINUED | OUTPATIENT
Start: 2025-03-25 | End: 2025-03-25 | Stop reason: HOSPADM

## 2025-03-25 RX ORDER — GUAIFENESIN AND DEXTROMETHORPHAN HYDROBROMIDE 10; 100 MG/5ML; MG/5ML
10 SYRUP ORAL EVERY 4 HOURS PRN
Qty: 236 ML | Refills: 0 | Status: ON HOLD | OUTPATIENT
Start: 2025-03-25 | End: 2025-04-04

## 2025-03-25 RX ADMIN — ATORVASTATIN CALCIUM 40 MG: 40 TABLET, FILM COATED ORAL at 08:03

## 2025-03-25 RX ADMIN — HYDROXYZINE HYDROCHLORIDE 25 MG: 25 TABLET ORAL at 03:03

## 2025-03-25 RX ADMIN — FUROSEMIDE 20 MG: 20 TABLET ORAL at 08:03

## 2025-03-25 RX ADMIN — INSULIN ASPART 8 UNITS: 100 INJECTION, SOLUTION INTRAVENOUS; SUBCUTANEOUS at 11:03

## 2025-03-25 RX ADMIN — IPRATROPIUM BROMIDE AND ALBUTEROL SULFATE 3 ML: 2.5; .5 SOLUTION RESPIRATORY (INHALATION) at 07:03

## 2025-03-25 RX ADMIN — INSULIN ASPART 2 UNITS: 100 INJECTION, SOLUTION INTRAVENOUS; SUBCUTANEOUS at 11:03

## 2025-03-25 RX ADMIN — GUAIFENESIN 1200 MG: 600 TABLET, EXTENDED RELEASE ORAL at 08:03

## 2025-03-25 RX ADMIN — METOPROLOL SUCCINATE 50 MG: 50 TABLET, EXTENDED RELEASE ORAL at 08:03

## 2025-03-25 RX ADMIN — SACUBITRIL AND VALSARTAN 1 TABLET: 24; 26 TABLET, FILM COATED ORAL at 08:03

## 2025-03-25 RX ADMIN — ASPIRIN 81 MG CHEWABLE TABLET 81 MG: 81 TABLET CHEWABLE at 08:03

## 2025-03-25 RX ADMIN — BENZONATATE 200 MG: 100 CAPSULE ORAL at 03:03

## 2025-03-25 RX ADMIN — INSULIN ASPART 8 UNITS: 100 INJECTION, SOLUTION INTRAVENOUS; SUBCUTANEOUS at 08:03

## 2025-03-25 RX ADMIN — IPRATROPIUM BROMIDE AND ALBUTEROL SULFATE 3 ML: 2.5; .5 SOLUTION RESPIRATORY (INHALATION) at 12:03

## 2025-03-25 RX ADMIN — AMLODIPINE BESYLATE 10 MG: 5 TABLET ORAL at 08:03

## 2025-03-25 RX ADMIN — GUAIFENESIN AND DEXTROMETHORPHAN 10 ML: 20; 200 SYRUP ORAL at 04:03

## 2025-03-25 RX ADMIN — PREDNISONE 40 MG: 20 TABLET ORAL at 08:03

## 2025-03-25 RX ADMIN — FAMOTIDINE 20 MG: 20 TABLET, FILM COATED ORAL at 08:03

## 2025-03-25 RX ADMIN — SULFAMETHOXAZOLE AND TRIMETHOPRIM 1 TABLET: 800; 160 TABLET ORAL at 08:03

## 2025-03-25 RX ADMIN — SUCRALFATE 1 G: 1 TABLET ORAL at 05:03

## 2025-03-25 RX ADMIN — MYCOPHENOLATE MOFETIL 1000 MG: 250 CAPSULE ORAL at 09:03

## 2025-03-25 RX ADMIN — BUDESONIDE 1 MG: 0.5 INHALANT RESPIRATORY (INHALATION) at 07:03

## 2025-03-25 NOTE — NURSING
AVS provided to the patient. Telemetry monitor removed. All belongings in patient's possession. VN notified that patient is ready for teaching.

## 2025-03-25 NOTE — PLAN OF CARE
NORMA sent Anaya BOWER a secure chat for patient's home 02.     Anaya BOWER has requested that patient's brother bring the tanks from home so they can be switched out with new tanks.     Patient informed CM that his brother is not able to pick him up due to being at work.     CM will provide wheelchair van transportation home so patient can have oxygen needed to get home. Patient will connect to his concentrator once at home.     Anaya BOWER provided patient with a [facility] once [facility] home and [facility] will go to the home and switch out old tanks for new ones.     Bedside nurse April at beside during conversation.    NORMA will proceed with discharge.

## 2025-03-25 NOTE — PLAN OF CARE
Case Management Final Discharge Note    [facility]     New DME ordered / company name: Oxygen tanks- patient will contact DME once home to switch out tanks.     Relevant SDOH / Transition of Care Barriers:      Person available to provide assistance at home when needed and their contact information: Extended Emergency Contact Information  Primary Emergency Contact: Emmanuel Grant Jr   [place]  Mobile Phone: [phone]  Relation: Son     Scheduled followup appointment: PCP- 03/26/25    Referrals placed: [facility]     Transportation: Wheelchair van transportation has been scheduled to transport patient home.     Patient and family educated on discharge services and updated on DC plan. Bedside RN Yen Fernández notified, patient clear to discharge from Case Management Perspective.       03/25/25 1324   Final Note   Assessment Type Final Discharge Note   Anticipated Discharge Disposition Home-Health   What phone number can be called within the next 1-3 days to see how you are doing after discharge? [phone]   Hospital Resources/Appts/Education Provided Appointments scheduled and added to AVS   Post-Acute Status   Post-Acute Authorization Home Health   Home Health Status Set-up Complete/Auth obtained   Coverage [employer]   Discharge Delays None known at this time

## 2025-03-25 NOTE — CONSULTS
Food & Nutrition  Education    Diet Education: For education on fluid and salt restriction   Time Spent: 20 minutes  Learners: Patient      Nutrition Education provided with handouts: Attached handouts to discharge instructions. High Calorie High Protein Diet, Minimize Weight Loss, Fluid Restricted Diet, Heart Healthy Diet      Comments: Receiving Consistent CHO diet with 1200 mL fluid restriction with 100% intake recorded. Pt reports he weighted 150 lbs in January 2024, now weighing 125 lbs. Was on prolonged antibiotics. Discussed ways to add protein, and increase total calorie intake. Discussed healthy ways to add calories throughout the day, increasing to eating every 3 hours. Goal for 3 meals and 2-3 snacks per day. Pt expressed understanding of all topics discussed today. All questions/concerns addressed.       All questions and concerns answered. Dietitian's contact information provided.       Follow-Up: 1x/week    Please Re-consult as needed        Thanks!

## 2025-03-25 NOTE — DISCHARGE SUMMARY
[facility] Medicine  Discharge Summary      Patient Name: Emmanuel Grant  MRN: [id number]  ALVARO: [id number]  Patient Class: IP- Inpatient  Admission Date: 3/23/2025  Hospital Length of Stay: 1 days  Discharge Date and Time:  03/25/2025 1:39 PM  Attending Physician: Macario Montgomery MD   Discharging Provider: Valentín Webb Jr, NP  Primary Care Provider: Wilfredo De Souza MD    Primary Care Team: VALENTÍN WEBB    HPI:   Emmanuel Grant is a 73 yo male with pmhx hypertenion, hyperlipidemia, DMT2, CAD sp stents , HFrEF EF 40-45%, DMT2, chronic hypoxia respiratory failure on home oxygen due to  ILD/IPF on immunosupression therapy (cellcept 1 g BID and OFEV 150 mg BID,  Bactrim daily) on home oxygen 3 L NC who presents again to ED for shortness of breath since yesterday. He have chronic productive cough. There was issues with portable home oxygen yesterday when he want to ED main campus and still issue. He was on his way to store this am but worsen shortness of breath which prompt him to call his brother he brought him to ER. No home treatment prior to admission. Denies fever, chest pain.   Previously on prednisone 60 mg daily then recently stopped (not sure if he wean or completely stop).   Recent admission for CP 3/16-3/17 NST large fixed anteroapical defect similar to previous NST 6/17/2024)    EKG ST PAC Qtc 435.  (elevated compared to previous), Troponin negative.  RSV, COVID 19, and Flu negative   CXR CLD similar to previous.     * No surgery found *      Hospital Course:   Admission to hospital for acute and chronic respiratory failure with hypoxia due to interstitial lung disease exacerbation and acute on chronic diastolic heart failure. Patient is a poor historian. Patient stopped prednisone once he was started on OFEU and uncontrolled blood sugar. Restart prednisone and taper off slowly, continue cellcept, OFEV (Brother to bring from home).   He reports that his breathing has greatly  improved and requests discharge.  Accepted for home health.  Order for home oxygen placed.  Prescriptions were sent to pharmacy.  All findings and plan discussed with patient, all questions answered, he verbalizes understanding and agreement.  Discharged home in stable condition.     Goals of Care Treatment Preferences:  Code Status: Full Code      SDOH Screening:  The patient was screened for utility difficulties, food insecurity, transport difficulties, housing insecurity, and interpersonal safety and there were no concerns identified this admission.     Consults:   Consults (From admission, onward)          Status Ordering Provider     Inpatient consult to Social Work/Case Management  Once        Provider:  (Not yet assigned)    Completed CHANDRAKANT DANIELS     Inpatient consult to Registered Dietitian/Nutritionist  Once        Provider:  (Not yet assigned)    Completed CHANDRAKANT DANIELS     Inpatient consult to Social Work  Once        Provider:  (Not yet assigned)    Completed DES JEFFERSON            Assessment & Plan  Interstitial lung disease exacerbation  Patient presents to ED for worsen shortness of breath while walking down step to go store this am. He is no longer taking prednisone but not sure if wean or not. He is compliance with cellcept AKILA  Reviewed Dr. Rivas clinic note from last month and plan was to wean prednisone   EKG ST , BNP elevated   98% on 3 L NC in ED  Continue Bactrim for PCP prophylaxis  Continue Cellcept   Brother to home OFEV  Start prednisone 40 mg then taper, schedule duoneb/steroid neb  Continue Lasix 40 mg IV BID again today then switch to PO tomorrow       Acute on chronic respiratory failure with hypoxia  See above #1  Type 2 diabetes mellitus without complication, without long-term current use of insulin  Lab Results   Component Value Date    HGBA1C 7.8 (H) 01/02/2025   Add meal time insulin, continue SSI        Coronary artery disease involving native coronary  artery of native heart with angina pectoris  Patient with known CAD s/p stent placement, which is controlled Will continue ASA and Statin and monitor for S/Sx of angina/ACS. Continue to monitor on telemetry.   GERD (gastroesophageal reflux disease)  Protonix -hives??  Continue Pecid/Simethicone  Aspiration precaution       Essential hypertension  Patient's blood pressure range in the last 24 hours was: BP  Min: 117/75  Max: 131/88.The patient's inpatient anti-hypertensive regimen is listed below:  Current Antihypertensives  amLODIPine tablet 10 mg, Daily, Oral  metoprolol succinate (TOPROL-XL) 24 hr tablet 50 mg, Daily, Oral  hydrALAZINE injection 10 mg, Every 6 hours PRN, Intravenous  furosemide tablet 20 mg, Daily, Oral      Chronic HFrEF (heart failure with reduced ejection fraction)  Results for orders placed during the hospital encounter of 03/16/25  Echo  Interpretation Summary    Left Ventricle: The left ventricle is normal in size. There is mildly reduced systolic function with a visually estimated ejection fraction of 40 - 45%.    Right Ventricle: The right ventricle is normal in size. Systolic function is normal.    Aortic Valve: There is mild aortic valve sclerosis.    Mitral Valve: There is moderate mitral annular calcification.    Aorta: Aortic root is mildly dilated measuring 3.73 cm.    Pulmonary Artery: The estimated pulmonary artery systolic pressure is 30 mmHg.    IVC/SVC: Normal venous pressure at 3 mmHg.  BNP elevated compared to prior  Lasix 40 mg IV BID and switch to PO tomorrow   Continue GDMT as tolerated-entresto, metoprolol      Immunosuppression due to drug therapy  See above #1  IPF (idiopathic pulmonary fibrosis)  See above #1    Final Active Diagnoses:    Diagnosis Date Noted POA    PRINCIPAL PROBLEM:  Interstitial lung disease exacerbation [J84.9] 02/28/2024 Yes    Acute on chronic respiratory failure with hypoxia [J96.21] 03/23/2025 Yes    IPF (idiopathic pulmonary fibrosis) [J84.112]  "02/18/2025 Yes    Immunosuppression due to drug therapy [D84.821, Z79.899] 10/10/2024 Not Applicable    Chronic HFrEF (heart failure with reduced ejection fraction) [I50.22] 05/01/2024 Yes    GERD (gastroesophageal reflux disease) [K21.9] 04/20/2024 Yes    Coronary artery disease involving native coronary artery of native heart with angina pectoris [I25.119] 02/28/2024 Yes    Type 2 diabetes mellitus without complication, without long-term current use of insulin [E11.9] 10/08/2021 Yes    Essential hypertension [I10] 05/29/2020 Yes      Problems Resolved During this Admission:       Discharged Condition: stable    Disposition: Home or Self Care    Follow Up:   Contact information for follow-up providers       Oleg Preston MD Follow up.    Specialty: Nephrology  Contact information:  [address] [facility] [address]  [address]  [facility] [id number] [phone]                       Contact information for after-discharge care       Home Medical Care       [facility] .    Service: Home Health Services  Contact information:  [address] [facility]  [facility] [id number] [phone]                                 Patient Instructions:      OXYGEN FOR HOME USE     Order Specific Question Answer Comments   Liter Flow 2    Duration continuous    Qualifying Test Performed at: Activity    Oxygen saturation at rest 91    Oxygen saturation with activity 87    Oxygen saturation with activity on oxygen 89    Portable mode: continuous    Route nasal cannula    Device: home concentrator with portable tanks    Length of need (in months): 99 mos    Patient condition with qualifying saturation Other - List qualifying diagnosis and code    Select a diagnosis & list the code in the comments Interstitial lung disease [295578]    Height: 5' 8" (1.727 m)    Weight: 56.7 kg (125 lb)    Alternative treatment measures have been tried or considered and deemed clinically ineffective. Yes      Ambulatory referral/consult to " Outpatient Case Management   Referral Priority: Routine Referral Type: Consultation   Referral Reason: Specialty Services Required   Number of Visits Requested: 1     Ambulatory referral/consult to [facility]   Standing Status: Future   Referral Priority: Routine Referral Type: Consultation   Referral Reason: Specialty Services Required   Number of Visits Requested: 1     Ambulatory referral/consult to [facility]     Diet diabetic     Diet Cardiac     Diet diabetic     Diet Cardiac     Notify your health care provider if you experience any of the following:  temperature >100.4     Notify your health care provider if you experience any of the following:  difficulty breathing or increased cough     Notify your health care provider if you experience any of the following:  persistent dizziness, light-headedness, or visual disturbances     Activity as tolerated     Activity as tolerated       Significant Diagnostic Studies: Labs: CMP   Recent Labs   Lab 03/24/25  0642 03/25/25  0600    133*   K 3.8 3.7    96   CO2 25 25   BUN 17 31*   CREATININE 0.8 1.2   CALCIUM 9.4 10.0   ALBUMIN 3.1* 3.3*   BILITOT 0.3 0.3   ALKPHOS 76 74   AST 15 19   ALT 32 31   ANIONGAP 11 12    and CBC   Recent Labs   Lab 03/24/25  0638   WBC 5.98   HGB 13.7*   HCT 39.7*          Pending Diagnostic Studies:       None           Medications:  Reconciled Home Medications:      Medication List        START taking these medications      dextromethorphan-guaiFENesin  mg/5 ml  mg/5 mL liquid  Commonly known as: ROBITUSSIN-DM  Take 10 mLs by mouth every 4 (four) hours as needed (cough).     simethicone 80 MG chewable tablet  Commonly known as: MYLICON  Take 1 tablet (80 mg total) by mouth 3 (three) times daily as needed for Flatulence.            CHANGE how you take these medications      albuterol-ipratropium 2.5 mg-0.5 mg/3 mL nebulizer solution  Commonly known as: DUO-NEB  Take 3  "mLs by nebulization every 4 (four) hours. Rescue  What changed:   when to take this  reasons to take this     predniSONE 10 MG tablet  Commonly known as: DELTASONE  Take 4 tablets (40 mg total) by mouth once daily for 5 days, THEN 2 tablets (20 mg total) once daily for 5 days, THEN 1 tablet (10 mg total) once daily for 5 days, THEN 0.5 tablets (5 mg total) once daily for 5 days. Take 40 mg for 5 days, 20 mg for 5 days and 10 mg for 5 day.  Start taking on: March 25, 2025  What changed:   medication strength  See the new instructions.            CONTINUE taking these medications      amLODIPine 10 MG tablet  Commonly known as: NORVASC  Take 1 tablet (10 mg total) by mouth once daily.     arformoteroL 15 mcg/2 mL Nebu  Commonly known as: BROVANA  Take 2 mLs (15 mcg total) by nebulization 2 (two) times daily. Controller     aspirin 81 MG Chew  Take 81 mg by mouth once daily.     BD ULTRA-FINE HEATHER PEN NEEDLE 32 gauge x 5/32" Ndle  Generic drug: pen needle, diabetic  1 each by Misc.(Non-Drug; Combo Route) route once daily.     budesonide 0.5 mg/2 mL nebulizer solution  Commonly known as: PULMICORT  Take 4 mLs (1 mg total) by nebulization 2 (two) times daily. Controller     cholecalciferol (vitamin D3) 50 mcg (2,000 unit) Cap capsule  Commonly known as: VITAMIN D3  Take 1 capsule by mouth once daily.     ENTRESTO 24-26 mg per tablet  Generic drug: sacubitriL-valsartan  Take 1 tablet by mouth 2 (two) times daily.     famotidine 20 MG tablet  Commonly known as: PEPCID  Take 1 tablet (20 mg total) by mouth 2 (two) times daily as needed for Heartburn.     FREESTYLE RAMBO 3 PLUS SENSOR Blanche  Generic drug: blood-glucose sensor  Change every 15 days     furosemide 20 MG tablet  Commonly known as: LASIX  Take 1 tablet (20 mg total) by mouth once daily.     glimepiride 4 MG tablet  Commonly known as: AMARYL  Take 1 tablet (4 mg total) by mouth before breakfast.     insulin aspart U-100 100 unit/mL (3 mL) Inpn pen  Commonly known " as: NovoLOG Flexpen U-100 Insulin  Inject as needed before meals: 180-230=+1, 231-280=+2, 281-330=+3, 331-380=+4, over 380=+5 units     latanoprost (PF) 0.005 % Drop  1 drop into affected eye in the evening Ophthalmic Once a day     metFORMIN 500 MG ER 24hr tablet  Commonly known as: GLUCOPHAGE-XR  Take 1 tablet (500 mg total) by mouth 2 (two) times daily with meals.     metoprolol succinate 50 MG 24 hr tablet  Commonly known as: TOPROL-XL  Take 1 tablet (50 mg total) by mouth once daily.     mirabegron 25 mg Tb24 ER tablet  Commonly known as: MYRBETRIQ  Take 1 tablet (25 mg total) by mouth once daily.     mycophenolate 250 mg Cap  Commonly known as: CELLCEPT  Take 4 capsules (1,000 mg total) by mouth 2 (two) times daily.     nitroGLYCERIN 0.3 MG SL tablet  Commonly known as: NITROSTAT  Place 1 tablet (0.3 mg total) under the tongue every 5 (five) minutes as needed for Chest pain.     OFEV 150 mg Cap  Generic drug: nintedanib  Take 1 capsule (150 mg total) by mouth 2 (two) times a day.     omeprazole 40 MG capsule  Commonly known as: PRILOSEC  Take 40 mg by mouth once daily.     ONETOUCH DELICA PLUS LANCET 33 gauge Misc  Generic drug: lancets  Apply topically 3 (three) times daily.     rosuvastatin 20 MG tablet  Commonly known as: CRESTOR  Take 1 tablet (20 mg total) by mouth once daily.     sucralfate 1 gram tablet  Commonly known as: CARAFATE  Take 1 tablet (1 g total) by mouth 4 (four) times daily before meals and nightly.     sulfamethoxazole-trimethoprim 800-160mg 800-160 mg Tab  Commonly known as: BACTRIM DS  Take 1 tablet by mouth once daily.              Indwelling Lines/Drains at time of discharge:   Lines/Drains/Airways       None                   Time spent on the discharge of patient: 35 minutes         Valentín Webb Jr NP  Department of Hospital Medicine  [facility]

## 2025-03-25 NOTE — TELEPHONE ENCOUNTER
I spoke with patient in regards to his oxygen tank. I informed patient that I will contact his HME. Patient stated he reached out to his HME. I advised patient to call the office if he have any questions or concerns. Patient verbalized understanding.

## 2025-03-25 NOTE — ASSESSMENT & PLAN NOTE
Patient's blood pressure range in the last 24 hours was: BP  Min: 117/75  Max: 131/88.The patient's inpatient anti-hypertensive regimen is listed below:  Current Antihypertensives  amLODIPine tablet 10 mg, Daily, Oral  metoprolol succinate (TOPROL-XL) 24 hr tablet 50 mg, Daily, Oral  hydrALAZINE injection 10 mg, Every 6 hours PRN, Intravenous  furosemide tablet 20 mg, Daily, Oral

## 2025-03-25 NOTE — PLAN OF CARE
Problem: Adult Inpatient Plan of Care  Goal: Plan of Care Review  Outcome: Met  Goal: Patient-Specific Goal (Individualized)  Outcome: Met  Goal: Absence of Hospital-Acquired Illness or Injury  Outcome: Met  Goal: Optimal Comfort and Wellbeing  Outcome: Met  Goal: Readiness for Transition of Care  Outcome: Met     Problem: Coping Ineffective  Goal: Effective Coping  Outcome: Met     Problem: Diabetes Comorbidity  Goal: Blood Glucose Level Within Targeted Range  Outcome: Met     Problem: Skin Injury Risk Increased  Goal: Skin Health and Integrity  Outcome: Met

## 2025-03-25 NOTE — NURSING
[facility] [place]  Nurses Note -- 4 Eyes      3/25/2025       Skin assessed on: Q Shift      [x] No Pressure Injuries Present    []Prevention Measures Documented    [] Yes LDA  for Pressure Injury Previously documented     [] Yes New Pressure Injury Discovered   [] LDA for New Pressure Injury Added      Attending RN:  Yen Fernández RN     Second RN:  KANDI Borrego

## 2025-03-25 NOTE — NURSING
[facility], [facility] [place]  Nurses Note -- 4 Eyes      3/24/2025       Skin assessed on: Q Shift      [x] No Pressure Injuries Present    []Prevention Measures Documented    [] Yes LDA  for Pressure Injury Previously documented     [] Yes New Pressure Injury Discovered   [] LDA for New Pressure Injury Added      Attending RN:  Salima Sahni LPN     Second RN:  Ai Stout RN

## 2025-03-25 NOTE — PLAN OF CARE
[facility] - Telemetry  Initial Discharge Assessment       Primary Care Provider: Wilfredo De Souza MD    Admission Diagnosis: SOB (shortness of breath) [R06.02]    Admission Date: 3/23/2025  Expected Discharge Date: 3/25/2025    Transition of Care Barriers: (P) None    Payor: HU[employer] MANAGED MEDICARE / Plan: HU[employer]NA MEDICARE HMO / Product Type: Capitation /     Extended Emergency Contact Information  Primary Emergency Contact: Emmanuel Grant Jr   [place]  Mobile Phone: [phone]  Relation: Son    Discharge Plan A: (P) Home  Discharge Plan B: (P) Home      [id number] - [place], [place] - [address] [place] [address] [place] [facility] [address] [address]  Phone: [phone] Fax: [phone]    [facility] [place] [place]  [address]  Suite   [facility] [address]  Phone: [phone] Fax: [phone]      CM met with patient at bedside to discuss discharge planning. Patient is independent, uses home oxygen and lives alone. Patient informed with CM that his home oxygen need to be evaluated due to not working properly. CM will follow-up with [facility] due to patient stating his Oxygen was provided by [facility].      Patient's brother will provide needed support and transportation home upon discharge.    CM will continue to follow patient for discharge needs.     Initial Assessment (most recent)       Adult Discharge Assessment - 03/25/25 1203          Discharge Assessment    Assessment Type Discharge Planning Assessment     Confirmed/corrected address, phone number and insurance Yes     Confirmed Demographics Correct on Facesheet     Source of Information patient     When was your last doctors appointment? 02/27/25     Communicated DERIC with patient/caregiver Yes     Reason For Admission Interstitial lung disease exacerbation (P)      People in Home alone (P)      Facility Arrived From: Home (P)      Do you expect to return to your current living  situation? Yes (P)      Do you have help at home or someone to help you manage your care at home? No (P)      Prior to hospitilization cognitive status: Alert/Oriented (P)      Current cognitive status: Alert/Oriented (P)      Walking or Climbing Stairs Difficulty no (P)      Dressing/Bathing Difficulty no (P)      Equipment Currently Used at Home oxygen (P)      Readmission within 30 days? Yes (P)      Patient currently being followed by outpatient case management? No (P)      Do you currently have service(s) that help you manage your care at home? No (P)      Do you take prescription medications? Yes (P)      Do you have prescription coverage? Yes (P)      Coverage [employer] Managed Medicare (P)      Do you have any problems affording any of your prescribed medications? No (P)      Is the patient taking medications as prescribed? yes (P)      Who is going to help you get home at discharge? Patient's brother will provide transportation home (P)      How do you get to doctors appointments? family or friend will provide (P)      Are you on dialysis? No (P)      Do you take coumadin? No (P)      Discharge Plan A Home (P)      Discharge Plan B Home (P)      DME Needed Upon Discharge  none (P)      Discharge Plan discussed with: Patient (P)      Transition of Care Barriers None (P)      SDOH -- (P)    None       Physical Activity    On average, how many days per week do you engage in moderate to strenuous exercise (like a brisk walk)? 0 days (P)      On average, how many minutes do you engage in exercise at this level? 0 min (P)         Financial Resource Strain    How hard is it for you to pay for the very basics like food, housing, medical care, and heating? Not hard at all (P)         Housing Stability    In the last 12 months, was there a time when you were not able to pay the mortgage or rent on time? No (P)      At any time in the past 12 months, were you homeless or living in a shelter (including now)? No (P)          Transportation Needs    In the past 12 months, has lack of transportation kept you from medical appointments or from getting medications? No (P)      In the past 12 months, has lack of transportation kept you from meetings, work, or from getting things needed for daily living? No (P)         Food Insecurity    Within the past 12 months, you worried that your food would run out before you got the money to buy more. Never true (P)      Within the past 12 months, the food you bought just didn't last and you didn't have money to get more. Never true (P)         Stress    Do you feel stress - tense, restless, nervous, or anxious, or unable to sleep at night because your mind is troubled all the time - these days? Not at all (P)         Social Isolation    How often do you feel lonely or isolated from those around you?  Never (P)         Alcohol Use    Q1: How often do you have a drink containing alcohol? Never (P)      Q2: How many drinks containing alcohol do you have on a typical day when you are drinking? Patient does not drink (P)      Q3: How often do you have six or more drinks on one occasion? Never (P)                      Readmission Assessment (most recent)       Readmission Assessment - 03/25/25 1203          Readmission    Was this a planned readmission? Yes     Why were you hospitalized in the last 30 days? Chest tighting     Why were you readmitted? New medical problem     When you left the hospital how did you feel? Good     When you left the hospital where did you go? Home Alone     Did patient/caregiver refused recommended DC plan? No     Did you try to see or did see a doctor or nurse before you came? No     Did you have  a follow-up appointment on discharge? Yes     Did you go? Yes

## 2025-03-26 ENCOUNTER — OFFICE VISIT (OUTPATIENT)
Dept: FAMILY MEDICINE | Facility: CLINIC | Age: 75
End: 2025-03-26
Payer: MEDICARE

## 2025-03-26 VITALS
DIASTOLIC BLOOD PRESSURE: 64 MMHG | WEIGHT: 126.75 LBS | BODY MASS INDEX: 19.21 KG/M2 | OXYGEN SATURATION: 97 % | SYSTOLIC BLOOD PRESSURE: 124 MMHG | HEART RATE: 91 BPM | HEIGHT: 68 IN | TEMPERATURE: 98 F

## 2025-03-26 DIAGNOSIS — I50.22 CHRONIC HFREF (HEART FAILURE WITH REDUCED EJECTION FRACTION): ICD-10-CM

## 2025-03-26 DIAGNOSIS — D84.821 DRUG-INDUCED IMMUNODEFICIENCY: ICD-10-CM

## 2025-03-26 DIAGNOSIS — Z79.899 DRUG-INDUCED IMMUNODEFICIENCY: ICD-10-CM

## 2025-03-26 DIAGNOSIS — J84.112 IPF (IDIOPATHIC PULMONARY FIBROSIS): ICD-10-CM

## 2025-03-26 DIAGNOSIS — K21.9 GASTROESOPHAGEAL REFLUX DISEASE WITHOUT ESOPHAGITIS: ICD-10-CM

## 2025-03-26 DIAGNOSIS — Z60.2 PROBLEMS RELATED TO LIVING ALONE: ICD-10-CM

## 2025-03-26 DIAGNOSIS — K21.9 GASTROESOPHAGEAL REFLUX DISEASE, UNSPECIFIED WHETHER ESOPHAGITIS PRESENT: ICD-10-CM

## 2025-03-26 DIAGNOSIS — J96.11 CHRONIC HYPOXEMIC RESPIRATORY FAILURE: Primary | ICD-10-CM

## 2025-03-26 DIAGNOSIS — I10 ESSENTIAL HYPERTENSION: ICD-10-CM

## 2025-03-26 LAB — POCT GLUCOSE: 218 MG/DL (ref 70–110)

## 2025-03-26 PROCEDURE — 99999 PR PBB SHADOW E&M-EST. PATIENT-LVL V: CPT | Mod: PBBFAC,HCNC,, | Performed by: INTERNAL MEDICINE

## 2025-03-26 PROCEDURE — 3008F BODY MASS INDEX DOCD: CPT | Mod: HCNC,CPTII,S$GLB, | Performed by: INTERNAL MEDICINE

## 2025-03-26 PROCEDURE — 1126F AMNT PAIN NOTED NONE PRSNT: CPT | Mod: HCNC,CPTII,S$GLB, | Performed by: INTERNAL MEDICINE

## 2025-03-26 PROCEDURE — 3051F HG A1C>EQUAL 7.0%<8.0%: CPT | Mod: HCNC,CPTII,S$GLB, | Performed by: INTERNAL MEDICINE

## 2025-03-26 PROCEDURE — 3074F SYST BP LT 130 MM HG: CPT | Mod: HCNC,CPTII,S$GLB, | Performed by: INTERNAL MEDICINE

## 2025-03-26 PROCEDURE — 1159F MED LIST DOCD IN RCRD: CPT | Mod: HCNC,CPTII,S$GLB, | Performed by: INTERNAL MEDICINE

## 2025-03-26 PROCEDURE — 1101F PT FALLS ASSESS-DOCD LE1/YR: CPT | Mod: HCNC,CPTII,S$GLB, | Performed by: INTERNAL MEDICINE

## 2025-03-26 PROCEDURE — 1111F DSCHRG MED/CURRENT MED MERGE: CPT | Mod: HCNC,CPTII,S$GLB, | Performed by: INTERNAL MEDICINE

## 2025-03-26 PROCEDURE — 1160F RVW MEDS BY RX/DR IN RCRD: CPT | Mod: HCNC,CPTII,S$GLB, | Performed by: INTERNAL MEDICINE

## 2025-03-26 PROCEDURE — 3288F FALL RISK ASSESSMENT DOCD: CPT | Mod: HCNC,CPTII,S$GLB, | Performed by: INTERNAL MEDICINE

## 2025-03-26 PROCEDURE — 3078F DIAST BP <80 MM HG: CPT | Mod: HCNC,CPTII,S$GLB, | Performed by: INTERNAL MEDICINE

## 2025-03-26 PROCEDURE — 99214 OFFICE O/P EST MOD 30 MIN: CPT | Mod: HCNC,S$GLB,, | Performed by: INTERNAL MEDICINE

## 2025-03-26 RX ORDER — AMLODIPINE BESYLATE 10 MG/1
10 TABLET ORAL DAILY
Qty: 90 TABLET | Refills: 3 | Status: SHIPPED | OUTPATIENT
Start: 2025-03-26 | End: 2026-03-26

## 2025-03-26 RX ORDER — FAMOTIDINE 20 MG/1
20 TABLET, FILM COATED ORAL 2 TIMES DAILY PRN
Qty: 60 TABLET | Refills: 1 | Status: SHIPPED | OUTPATIENT
Start: 2025-03-26 | End: 2026-03-26

## 2025-03-26 RX ORDER — SUCRALFATE 1 G/1
1 TABLET ORAL
Qty: 120 TABLET | Refills: 2 | Status: SHIPPED | OUTPATIENT
Start: 2025-03-26

## 2025-03-26 RX ORDER — OMEPRAZOLE 40 MG/1
40 CAPSULE, DELAYED RELEASE ORAL DAILY
Qty: 90 CAPSULE | Refills: 0 | Status: SHIPPED | OUTPATIENT
Start: 2025-03-26

## 2025-03-26 SDOH — SOCIAL DETERMINANTS OF HEALTH (SDOH): PROBLEMS RELATED TO LIVING ALONE: Z60.2

## 2025-03-26 NOTE — PROGRESS NOTES
HISTORY OF PRESENT ILLNESS:  Emmanuel Grant is a 74 y.o. male who presents to the clinic today for Hospital Follow Up  .  Emmanuel presents today for follow up of pulmonary fibrosis      Last seen by me 11/2024.    Multiple hospital admission since then.    Discharged form hospital yesterday (3/23-3/25/25).  Upcoming appts:  Cardiology 3/31  Endocrinology 4/9  Palliative Medicine 4/9  Pulmonology 4/21  Cardiology 5/1    He reports persistent cough occurring in the morning and evening, with prolonged duration of episodes. He requires oxygen at 3 L and uses nebulizer treatments twice daily, with two treatments in the morning and two in the evening.    He takes OFEV (Nintedanib) 150mg twice daily since February, Entresto twice daily, CellCept 1000mg twice daily, and is on a Prednisone taper (4 tablets daily for 5 days, then 2 tablets daily for 5 days, then 1 tablet daily for 5 days, then 0.5 tablet daily for 5 days).    He has never smoked and is a former beer drinker who quit in his 40s.    Discharge summary:  HPI:   Emmanuel Grant is a 75 yo male with pmhx hypertenion, hyperlipidemia, DMT2, CAD sp stents , HFrEF EF 40-45%, DMT2, chronic hypoxia respiratory failure on home oxygen due to  ILD/IPF on immunosupression therapy (cellcept 1 g BID and OFEV 150 mg BID,  Bactrim daily) on home oxygen 3 L NC who presents again to ED for shortness of breath since yesterday. He have chronic productive cough. There was issues with portable home oxygen yesterday when he want to ED main campus and still issue. He was on his way to store this am but worsen shortness of breath which prompt him to call his brother he brought him to ER. No home treatment prior to admission. Denies fever, chest pain.   Previously on prednisone 60 mg daily then recently stopped (not sure if he wean or completely stop).   Recent admission for CP 3/16-3/17 NST large fixed anteroapical defect similar to previous NST 6/17/2024)     EKG ST PAC Qtc 435. BNP  258 (elevated compared to previous), Troponin negative.  RSV, COVID 19, and Flu negative   CXR CLD similar to previous.      * No surgery found *       Hospital Course:   Admission to hospital for acute and chronic respiratory failure with hypoxia due to interstitial lung disease exacerbation and acute on chronic diastolic heart failure. Patient is a poor historian. Patient stopped prednisone once he was started on OFEU and uncontrolled blood sugar. Restart prednisone and taper off slowly, continue cellcept, OFEV (Brother to bring from home).   He reports that his breathing has greatly improved and requests discharge.  Accepted for home health.  Order for home oxygen placed.  Prescriptions were sent to pharmacy.  All findings and plan discussed with patient, all questions answered, he verbalizes understanding and agreement.  Discharged home in stable condition.       Hemoglobin A1C   Date Value Ref Range Status   01/02/2025 7.8 (H) 4.0 - 5.6 % Final     Comment:     ADA Screening Guidelines:  5.7-6.4%  Consistent with prediabetes  >or=6.5%  Consistent with diabetes    High levels of fetal hemoglobin interfere with the HbA1C  assay. Heterozygous hemoglobin variants (HbS, HgC, etc)do  not significantly interfere with this assay.   However, presence of multiple variants may affect accuracy.     09/25/2024 8.0 (H) 4.0 - 5.6 % Final     Comment:     ADA Screening Guidelines:  5.7-6.4%  Consistent with prediabetes  >or=6.5%  Consistent with diabetes    High levels of fetal hemoglobin interfere with the HbA1C  assay. Heterozygous hemoglobin variants (HbS, HgC, etc)do  not significantly interfere with this assay.   However, presence of multiple variants may affect accuracy.     08/05/2024 8.3 (H) 4.0 - 5.6 % Final     Comment:     ADA Screening Guidelines:  5.7-6.4%  Consistent with prediabetes  >or=6.5%  Consistent with diabetes    High levels of fetal hemoglobin interfere with the HbA1C  assay. Heterozygous hemoglobin  variants (HbS, HgC, etc)do  not significantly interfere with this assay.   However, presence of multiple variants may affect accuracy.       ROS:  General: -fever, -chills, -fatigue, -weight gain, -weight loss, +malaise  Eyes: -vision changes, -redness, -discharge  ENT: -ear pain, -nasal congestion, -sore throat  Cardiovascular: -chest pain, -palpitations, -lower extremity edema  Respiratory: +cough, +shortness of breath, +dyspnea at rest, +difficulty breathing  Gastrointestinal: -abdominal pain, -nausea, -vomiting, -diarrhea, -constipation, -blood in stool, +itching  Genitourinary: -dysuria, -hematuria, -frequency  Musculoskeletal: -joint pain, -muscle pain  Skin: -rash, -lesion  Neurological: -headache, -dizziness, -numbness, -tingling  Psychiatric: -anxiety, -depression, -sleep difficulty             PAST MEDICAL HISTORY:  Past Medical History:   Diagnosis Date    Acute hypoxic respiratory failure 09/23/2024    Oxygen therapy 24/7 now, continue 2 L oxygen therapy.      BPH (benign prostatic hyperplasia) 02/28/2024    CAD (coronary artery disease)     Sees [facility], h/o stent    Chronic HFrEF (heart failure with reduced ejection fraction) 05/01/2024    Diabetes mellitus     Hypertension     Kidney stone     Stroke     age 62    Type 2 diabetes mellitus without complication, without long-term current use of insulin 10/08/2021       PAST SURGICAL HISTORY:  Past Surgical History:   Procedure Laterality Date    24 HOUR IMPEDANCE PH MONITORING OF ESOPHAGUS IN PATIENT NOT TAKING ACID REDUCING MEDICATIONS N/A 11/13/2024    Procedure: IMPEDANCE PH STUDY, ESOPHAGEAL, 24 HOUR, IN PATIENT NOT TAKING ACID REDUCING MEDICATION;  Surgeon: Stephany Mendoza MD;  Location: [facility] ([address] [place] [address]);  Service: Endoscopy;  Laterality: N/A;  referral dr miguel/ off ppi / prep inst sent to pt via mail  11/6- precall attempted no answer. Unable to [facility]-  11/8 - pt not called, per chart review patient currently inpatient at [place] [address]     "CARDIAC CATHETERIZATION      with stent    COLONOSCOPY N/A 03/27/2024    Procedure: COLONOSCOPY;  Surgeon: Ariela Castro MD;  Location: [facility] ENDO;  Service: Endoscopy;  Laterality: N/A;    ESOPHAGEAL MANOMETRY WITH MEASUREMENT OF IMPEDANCE N/A 11/13/2024    Procedure: MANOMETRY, ESOPHAGUS, WITH IMPEDANCE MEASUREMENT;  Surgeon: Stephany Mendoza MD;  Location: [facility] ENDO ([facility] FLR);  Service: Endoscopy;  Laterality: N/A;    ESOPHAGOGASTRODUODENOSCOPY N/A 03/27/2024    Procedure: EGD (ESOPHAGOGASTRODUODENOSCOPY);  Surgeon: Ariela Castro MD;  Location: [facility] ENDO;  Service: Endoscopy;  Laterality: N/A;    LEFT HEART CATHETERIZATION Left 9/10/2024    Procedure: Left heart cath;  Surgeon: Jemal Drummond MD;  Location: [facility] CATH LAB;  Service: Cardiology;  Laterality: Left;    SHOULDER SURGERY Right        SOCIAL HISTORY:  Social History[1]    FAMILY HISTORY:  Family History   Problem Relation Name Age of Onset    Cancer Mother      Heart disease Sister      Colon cancer Sister      COPD Brother      Esophageal cancer Neg Hx         ALLERGIES AND MEDICATIONS: updated and reviewed.  Review of patient's allergies indicates:   Allergen Reactions    Dapagliflozin      Other reaction(s): Other (See Comments)    Pcn [penicillins]     Linagliptin Other (See Comments)     "it knocked me down", "it almost killed me"    Lisinopril Other (See Comments)     cough    Pantoprazole Hives     Medication List with Changes/Refills   Current Medications    ALBUTEROL-IPRATROPIUM (DUO-NEB) 2.5 MG-0.5 MG/3 ML NEBULIZER SOLUTION    Take 3 mLs by nebulization every 4 (four) hours. Rescue    ARFORMOTEROL (BROVANA) 15 MCG/2 ML NEBU    Take 2 mLs (15 mcg total) by nebulization 2 (two) times daily. Controller    ASPIRIN 81 MG CHEW    Take 81 mg by mouth once daily.    BLOOD-GLUCOSE SENSOR (FREESTYLE RAMBO 3 PLUS SENSOR) SHEFALI    Change every 15 days    BUDESONIDE (PULMICORT) 0.5 MG/2 ML NEBULIZER SOLUTION    Take 4 mLs (1 mg " "total) by nebulization 2 (two) times daily. Controller    CHOLECALCIFEROL, VITAMIN D3, (VITAMIN D3) 50 MCG (2,000 UNIT) CAP CAPSULE    Take 1 capsule by mouth once daily.    DEXTROMETHORPHAN-GUAIFENESIN  MG/5 ML (ROBITUSSIN-DM)  MG/5 ML LIQUID    Take 10 mLs by mouth every 4 (four) hours as needed (cough).    FUROSEMIDE (LASIX) 20 MG TABLET    Take 1 tablet (20 mg total) by mouth once daily.    GLIMEPIRIDE (AMARYL) 4 MG TABLET    Take 1 tablet (4 mg total) by mouth before breakfast.    INSULIN ASPART U-100 (NOVOLOG FLEXPEN U-100 INSULIN) 100 UNIT/ML (3 ML) INPN PEN    Inject as needed before meals: 180-230=+1, 231-280=+2, 281-330=+3, 331-380=+4, over 380=+5 units    LATANOPROST, PF, 0.005 % DROP    1 drop into affected eye in the evening Ophthalmic Once a day    METFORMIN (GLUCOPHAGE-XR) 500 MG ER 24HR TABLET    Take 1 tablet (500 mg total) by mouth 2 (two) times daily with meals.    METOPROLOL SUCCINATE (TOPROL-XL) 50 MG 24 HR TABLET    Take 1 tablet (50 mg total) by mouth once daily.    MIRABEGRON (MYRBETRIQ) 25 MG TB24 ER TABLET    Take 1 tablet (25 mg total) by mouth once daily.    MYCOPHENOLATE (CELLCEPT) 250 MG CAP    Take 4 capsules (1,000 mg total) by mouth 2 (two) times daily.    NINTEDANIB (OFEV) 150 MG CAP    Take 1 capsule (150 mg total) by mouth 2 (two) times a day.    NITROGLYCERIN (NITROSTAT) 0.3 MG SL TABLET    Place 1 tablet (0.3 mg total) under the tongue every 5 (five) minutes as needed for Chest pain.    ONETOUCH DELICA PLUS LANCET 33 GAUGE MISC    Apply topically 3 (three) times daily.    PEN NEEDLE, DIABETIC 32 GAUGE X 5/32" NDLE    1 each by Misc.(Non-Drug; Combo Route) route once daily.    PREDNISONE (DELTASONE) 10 MG TABLET    Take 4 tablets (40 mg total) by mouth once daily for 5 days, THEN 2 tablets (20 mg total) once daily for 5 days, THEN 1 tablet (10 mg total) once daily for 5 days, THEN 0.5 tablets (5 mg total) once daily for 5 days. Take 40 mg for 5 days, 20 mg for 5 days " "and 10 mg for 5 day.    ROSUVASTATIN (CRESTOR) 20 MG TABLET    Take 1 tablet (20 mg total) by mouth once daily.    SACUBITRIL-VALSARTAN (ENTRESTO) 24-26 MG PER TABLET    Take 1 tablet by mouth 2 (two) times daily.    SIMETHICONE (MYLICON) 80 MG CHEWABLE TABLET    Take 1 tablet (80 mg total) by mouth 3 (three) times daily as needed for Flatulence.    SULFAMETHOXAZOLE-TRIMETHOPRIM 800-160MG (BACTRIM DS) 800-160 MG TAB    Take 1 tablet by mouth once daily.   Changed and/or Refilled Medications    Modified Medication Previous Medication    AMLODIPINE (NORVASC) 10 MG TABLET amLODIPine (NORVASC) 10 MG tablet       Take 1 tablet (10 mg total) by mouth once daily.    Take 1 tablet (10 mg total) by mouth once daily.    FAMOTIDINE (PEPCID) 20 MG TABLET famotidine (PEPCID) 20 MG tablet       Take 1 tablet (20 mg total) by mouth 2 (two) times daily as needed for Heartburn.    Take 1 tablet (20 mg total) by mouth 2 (two) times daily as needed for Heartburn.    OMEPRAZOLE (PRILOSEC) 40 MG CAPSULE omeprazole (PRILOSEC) 40 MG capsule       Take 1 capsule (40 mg total) by mouth once daily.    Take 40 mg by mouth once daily.    SUCRALFATE (CARAFATE) 1 GRAM TABLET sucralfate (CARAFATE) 1 gram tablet       Take 1 tablet (1 g total) by mouth 4 (four) times daily before meals and nightly.    Take 1 tablet (1 g total) by mouth 4 (four) times daily before meals and nightly.          CARE TEAM:  Patient Care Team:  Wilfredo De Souza MD as PCP - General (Internal Medicine)  Tracy Espino LPN as Care Coordinator  Juani Hernandez LPN as Care Coordinator  Jackie Stevens RN as Transition Navigator         PHYSICAL EXAM:   Vitals:    03/26/25 1248   BP: 124/64   Pulse: 91   Temp: 97.6 °F (36.4 °C)     Weight: 57.5 kg (126 lb 12.2 oz)   Height: 5' 8" (172.7 cm)   Body mass index is 19.27 kg/m².    Physical Exam    Vitals: Oxygen level: 97% on 3 L NC.  General: No acute distress. Well-developed. Well-nourished.  Eyes: EOMI. Sclerae " anicteric.  HENT: Normocephalic. Atraumatic. Nares patent. Moist oral mucosa.  Ears: Bilateral TMs clear. Bilateral EACs clear.  Cardiovascular: Regular rate. Regular rhythm. No murmurs. No rubs. No gallops. Normal S1, S2.  Respiratory: Normal respiratory effort. Clear to auscultation bilaterally. No rales. No rhonchi. No wheezing.  Musculoskeletal: No  obvious deformity.  Extremities: No lower extremity edema.  Neurological: Alert & oriented x3. No slurred speech. Normal gait.  Psychiatric: Normal mood. Normal affect. Good insight. Good judgment.  Skin: Warm. Dry. No rash.             ASSESSMENT AND PLAN:  IMPRESSION:  - Pulmonary fibrosis causing persistent cough and requiring oxygen therapy.  - CT chest from January 2025 showed stable findings compared to previous scan.  - Considered and ordered home health services to assist with monitoring given patient lives alone, to resume regular vital sign checks and therapy.      Chronic hypoxemic respiratory failure  IPF (idiopathic pulmonary fibrosis)  Drug-induced immunodeficiency  -     Ambulatory referral/consult to Home Health; Future; Expected date: 03/27/2025  -     OXYGEN FOR HOME USE  - Explained pulmonary fibrosis as the underlying cause of persistent cough and breathing difficulties.  - He is on OFEV (nintedanib) 150mg twice daily for pulmonary fibrosis management.  Continue daily Bactrim, Cellcept.  - Noted the patient reports breathing a little better since starting OFEV medication.  - Reviewed the last CT of the chest from January 2025, which appeared stable compared to the previous one.  - Acknowledged the difficulty in treating pulmonary fibrosis at an advanced stage.  - Maintain follow-up with pulmonologist Dr. Schmitt on April 21st.  - Noted the patient experiences frequent coughing, especially in the morning and evening, which lasts for extended periods.  - Continue nebulizer and supplemental oxygen.  - Instructed the patient to maintain oxygen at 3  L.    Essential hypertension  -     amLODIPine (NORVASC) 10 MG tablet; Take 1 tablet (10 mg total) by mouth once daily.  Dispense: 90 tablet; Refill: 3    Chronic HFrEF (heart failure with reduced ejection fraction)  -     Ambulatory referral/consult to Home Health; Future; Expected date: 03/27/2025  -     OXYGEN FOR HOME USE  - Continue Entresto.    Gastroesophageal reflux disease without esophagitis  -     famotidine (PEPCID) 20 MG tablet; Take 1 tablet (20 mg total) by mouth 2 (two) times daily as needed for Heartburn.  Dispense: 60 tablet; Refill: 1  -     omeprazole (PRILOSEC) 40 MG capsule; Take 1 capsule (40 mg total) by mouth once daily.  Dispense: 90 capsule; Refill: 0  -     sucralfate (CARAFATE) 1 gram tablet; Take 1 tablet (1 g total) by mouth 4 (four) times daily before meals and nightly.  Dispense: 120 tablet; Refill: 2    Problems related to living alone  - Considered and ordered home health services to assist with monitoring given the patient lives alone, to resume regular vital sign checks and therapy.  - Expressed concern about the patient living alone and suggested discussing long-term care plans with family.  - Noted the patient is  and his children live in different states.  - Discussed importance of advance care planning and designating a medical power of .    - [place] to bring all current medications to next appointment for review.  - Contact office if any worsening of symptoms or concerns arise.                  Follow up 2 months or sooner as needed.    This note was generated with the assistance of ambient listening technology. Verbal consent was obtained by the patient and accompanying visitor(s) for the recording of patient appointment to facilitate this note. I attest to having reviewed and edited the generated note for accuracy, though some syntax or spelling errors may persist. Please contact the author of this note for any clarification.            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   Tobacco Use    Smoking status: Never     Passive exposure: Never    Smokeless tobacco: Never   Substance and Sexual Activity    Alcohol use: No    Drug use: Never    Sexual activity: Not Currently     Social Drivers of Health     Financial Resource Strain: Low Risk  (3/25/2025)    Overall Financial Resource Strain (CARDIA)     Difficulty of Paying Living Expenses: Not hard at all   Food Insecurity: No Food Insecurity (3/25/2025)    Hunger Vital Sign     Worried About Running Out of Food in the Last Year: Never true     Ran Out of Food in the Last Year: Never true   Transportation Needs: No Transportation Needs (3/25/2025)    PRAPARE - Transportation     Lack of Transportation (Medical): No     Lack of Transportation (Non-Medical): No   Physical Activity: Inactive (3/25/2025)    Exercise Vital Sign     Days of Exercise per Week: 0 days     Minutes of Exercise per Session: 0 min   Stress: No Stress Concern Present (3/25/2025)    [place] [place] [employer] - Occupational Stress Questionnaire     Feeling of Stress : Not at all   Housing Stability: Low Risk  (3/25/2025)    Housing Stability Vital Sign     Unable to Pay for Housing in the Last Year: No     Homeless in the Last Year: No

## 2025-03-27 ENCOUNTER — OFFICE VISIT (OUTPATIENT)
Dept: URGENT CARE | Facility: CLINIC | Age: 75
End: 2025-03-27
Payer: MEDICARE

## 2025-03-27 ENCOUNTER — E-CONSULT (OUTPATIENT)
Facility: CLINIC | Age: 75
End: 2025-03-27
Payer: MEDICARE

## 2025-03-27 ENCOUNTER — DOCUMENTATION ONLY (OUTPATIENT)
Facility: CLINIC | Age: 75
End: 2025-03-27
Payer: MEDICARE

## 2025-03-27 ENCOUNTER — HOSPITAL ENCOUNTER (EMERGENCY)
Facility: HOSPITAL | Age: 75
Discharge: HOME | End: 2025-03-28
Attending: STUDENT IN AN ORGANIZED HEALTH CARE EDUCATION/TRAINING PROGRAM
Payer: MEDICARE

## 2025-03-27 VITALS
WEIGHT: 127.44 LBS | OXYGEN SATURATION: 96 % | TEMPERATURE: 99 F | HEART RATE: 106 BPM | RESPIRATION RATE: 24 BRPM | BODY MASS INDEX: 19.32 KG/M2 | HEIGHT: 68 IN | DIASTOLIC BLOOD PRESSURE: 77 MMHG | SYSTOLIC BLOOD PRESSURE: 110 MMHG

## 2025-03-27 DIAGNOSIS — R06.02 SOB (SHORTNESS OF BREATH): ICD-10-CM

## 2025-03-27 DIAGNOSIS — J84.9 INTERSTITIAL LUNG DISEASE: ICD-10-CM

## 2025-03-27 DIAGNOSIS — R07.9 CHEST PAIN, UNSPECIFIED TYPE: Primary | ICD-10-CM

## 2025-03-27 DIAGNOSIS — R05.9 COUGH, UNSPECIFIED TYPE: Primary | ICD-10-CM

## 2025-03-27 DIAGNOSIS — R05.2 SUBACUTE COUGH: Primary | ICD-10-CM

## 2025-03-27 LAB
ABSOLUTE EOSINOPHIL (OHS): 0.07 K/UL
ABSOLUTE MONOCYTE (OHS): 0.64 K/UL (ref 0.3–1)
ABSOLUTE NEUTROPHIL COUNT (OHS): 9.4 K/UL (ref 1.8–7.7)
ALBUMIN SERPL BCP-MCNC: 3.6 G/DL (ref 3.5–5.2)
ALP SERPL-CCNC: 75 UNIT/L (ref 40–150)
ALT SERPL W/O P-5'-P-CCNC: 59 UNIT/L (ref 10–44)
ANION GAP (OHS): 15 MMOL/L (ref 8–16)
AST SERPL-CCNC: 34 UNIT/L (ref 11–45)
BASOPHILS # BLD AUTO: 0.01 K/UL
BASOPHILS NFR BLD AUTO: 0.1 %
BILIRUB SERPL-MCNC: 0.3 MG/DL (ref 0.1–1)
BNP SERPL-MCNC: 252 PG/ML (ref 0–99)
BUN SERPL-MCNC: 35 MG/DL (ref 8–23)
CALCIUM SERPL-MCNC: 9.8 MG/DL (ref 8.7–10.5)
CHLORIDE SERPL-SCNC: 101 MMOL/L (ref 95–110)
CO2 SERPL-SCNC: 20 MMOL/L (ref 23–29)
CREAT SERPL-MCNC: 1.2 MG/DL (ref 0.5–1.4)
ERYTHROCYTE [DISTWIDTH] IN BLOOD BY AUTOMATED COUNT: 14.5 % (ref 11.5–14.5)
GFR SERPLBLD CREATININE-BSD FMLA CKD-EPI: >60 ML/MIN/1.73/M2
GLUCOSE SERPL-MCNC: 147 MG/DL (ref 70–110)
HCT VFR BLD AUTO: 41.9 % (ref 40–54)
HGB BLD-MCNC: 14.2 GM/DL (ref 14–18)
IMM GRANULOCYTES # BLD AUTO: 0.06 K/UL (ref 0–0.04)
IMM GRANULOCYTES NFR BLD AUTO: 0.5 % (ref 0–0.5)
LYMPHOCYTES # BLD AUTO: 0.95 K/UL (ref 1–4.8)
MCH RBC QN AUTO: 27.8 PG (ref 27–50)
MCHC RBC AUTO-ENTMCNC: 33.9 G/DL (ref 32–36)
MCV RBC AUTO: 82 FL (ref 82–98)
NUCLEATED RBC (/100WBC) (OHS): 0 /100 WBC
PLATELET # BLD AUTO: 341 K/UL (ref 150–450)
PMV BLD AUTO: 9.5 FL (ref 9.2–12.9)
POTASSIUM SERPL-SCNC: 4.2 MMOL/L (ref 3.5–5.1)
PROT SERPL-MCNC: 7.7 GM/DL (ref 6–8.4)
RBC # BLD AUTO: 5.1 M/UL (ref 4.6–6.2)
RELATIVE EOSINOPHIL (OHS): 0.6 %
RELATIVE LYMPHOCYTE (OHS): 8.5 % (ref 18–48)
RELATIVE MONOCYTE (OHS): 5.8 % (ref 4–15)
RELATIVE NEUTROPHIL (OHS): 84.5 % (ref 38–73)
SODIUM SERPL-SCNC: 136 MMOL/L (ref 136–145)
TROPONIN I SERPL DL<=0.01 NG/ML-MCNC: 0.01 NG/ML
WBC # BLD AUTO: 11.13 K/UL (ref 3.9–12.7)

## 2025-03-27 PROCEDURE — 71046 X-RAY EXAM CHEST 2 VIEWS: CPT | Mod: S$GLB,,, | Performed by: RADIOLOGY

## 2025-03-27 PROCEDURE — 80053 COMPREHEN METABOLIC PANEL: CPT | Mod: HCNC

## 2025-03-27 PROCEDURE — 93010 ELECTROCARDIOGRAM REPORT: CPT | Mod: HCNC,,, | Performed by: INTERNAL MEDICINE

## 2025-03-27 PROCEDURE — 84484 ASSAY OF TROPONIN QUANT: CPT | Mod: HCNC

## 2025-03-27 PROCEDURE — 83880 ASSAY OF NATRIURETIC PEPTIDE: CPT | Mod: HCNC

## 2025-03-27 PROCEDURE — 93005 ELECTROCARDIOGRAM TRACING: CPT | Mod: HCNC

## 2025-03-27 PROCEDURE — 85025 COMPLETE CBC W/AUTO DIFF WBC: CPT | Mod: HCNC

## 2025-03-27 PROCEDURE — 99214 OFFICE O/P EST MOD 30 MIN: CPT | Mod: S$GLB,,, | Performed by: FAMILY MEDICINE

## 2025-03-27 PROCEDURE — 99285 EMERGENCY DEPT VISIT HI MDM: CPT | Mod: 25,HCNC

## 2025-03-27 RX ORDER — GUAIFENESIN 600 MG/1
1200 TABLET, EXTENDED RELEASE ORAL 2 TIMES DAILY
Qty: 40 TABLET | Refills: 0 | Status: SHIPPED | OUTPATIENT
Start: 2025-03-27 | End: 2025-04-06

## 2025-03-27 RX ORDER — IPRATROPIUM BROMIDE AND ALBUTEROL SULFATE 2.5; .5 MG/3ML; MG/3ML
3 SOLUTION RESPIRATORY (INHALATION)
Status: COMPLETED | OUTPATIENT
Start: 2025-03-27 | End: 2025-03-28

## 2025-03-27 RX ORDER — ASPIRIN 325 MG
325 TABLET ORAL
Status: COMPLETED | OUTPATIENT
Start: 2025-03-27 | End: 2025-03-28

## 2025-03-27 RX ORDER — BENZONATATE 200 MG/1
200 CAPSULE ORAL 3 TIMES DAILY PRN
Qty: 30 CAPSULE | Refills: 0 | Status: SHIPPED | OUTPATIENT
Start: 2025-03-27 | End: 2025-04-06

## 2025-03-27 NOTE — PROGRESS NOTES
"Heart Failure [facility]itional [facility]    Attempted to call pt to complete 24-72hour post discharge "check in" call. Unable to reach pt at listed phone numbers.  Was unable to leave message with family nor on voicemail. Voicemail not set up or full. .     Will continue to try to reach patient.      "

## 2025-03-27 NOTE — PROGRESS NOTES
"Subjective:      Patient ID: Emmanuel Grant is a 74 y.o. male.    Vitals:  height is 5' 8" (1.727 m) and weight is 57.8 kg (127 lb 6.8 oz). His oral temperature is 98.5 °F (36.9 °C). His blood pressure is 110/77 and his pulse is 106. His respiration is 24 (abnormal) and oxygen saturation is 96%.     Chief Complaint: Cough    Pt is here for cough and sore throat. Pt symp started a month and a half ago. Pt hasn't treated with anything. He reports persistent cough occurring in the morning and evening, with prolonged duration of episodes. He requires oxygen at 3 L and uses nebulizer treatments twice daily, with two treatments in the morning and two in the evening. hypertenion, hyperlipidemia, DMT2, CAD sp stents , HFrEF EF 40-45%, DMT2, chronic hypoxia respiratory failure on home oxygen due to  ILD/IPF on immunosupression therapy (cellcept 1 g BID and OFEV 150 mg BID,  Bactrim daily) on home oxygen 3 L NC. He came to clinic today, his home oxygen ran out, he was 90% on room air on initial check. He says he has extra oxygen tanks at home. He says he is worried about this continued cough, he says he has gained some weight, weight 126 yesterday, 127 today, currently he saw pcp yesterday for hospital follow up. He denies any fever, denies any hx of pna, sometimes will have to lay on additional pillows at nighttime. He drove here today. He has additional oxygen tanks at home that are full. He is on pepcid for gerd.     Recently Discharged form hospital (3/23-3/25/25).  Upcoming appts:  Cardiology 3/31  Endocrinology 4/9  Palliative Medicine 4/9  Pulmonology 4/21  Cardiology 5/1     He takes OFEV (Nintedanib) 150mg twice daily since February, Entresto twice daily, CellCept 1000mg twice daily, and is on a Prednisone taper (4 tablets daily for 5 days, then 2 tablets daily for 5 days, then 1 tablet daily for 5 days, then 0.5 tablet daily for 5 days).     He has never smoked and is a former beer drinker who quit in his 40s.   "       Cough  This is a chronic problem. The current episode started more than 1 month ago. The problem has been unchanged. The problem occurs constantly. The cough is Productive of sputum. Associated symptoms include a sore throat and shortness of breath. Pertinent negatives include no chest pain, chills, ear congestion, ear pain, fever, headaches, heartburn, hemoptysis, myalgias, nasal congestion, postnasal drip, rash, rhinorrhea, sweats, weight loss or wheezing. Nothing aggravates the symptoms. He has tried nothing for the symptoms. The treatment provided no relief.     Constitution: Negative for activity change, appetite change, chills, sweating, fatigue, fever and unexpected weight change.   HENT:  Positive for sore throat. Negative for ear pain and postnasal drip.    Cardiovascular:  Negative for chest pain, leg swelling, palpitations, sob on exertion and passing out.   Respiratory:  Positive for cough and shortness of breath. Negative for chest tightness, sputum production, bloody sputum, COPD, stridor, wheezing and asthma.    Gastrointestinal:  Negative for heartburn.   Musculoskeletal:  Negative for muscle ache.   Skin:  Negative for rash.   Allergic/Immunologic: Negative for asthma.   Neurological:  Negative for headaches.      Objective:     Physical Exam   Constitutional: He is oriented to person, place, and time. He appears well-developed. He is cooperative.  Non-toxic appearance. He does not appear ill. No distress.      Comments:3lnc in place via NC  Patient is able to speak in complete sentences without distress.     HENT:   Head: Normocephalic and atraumatic.   Ears:   Right Ear: External ear normal.   Left Ear: External ear normal.   Nose: Nose normal.   Mouth/Throat: Oropharynx is clear and moist.   Eyes: Conjunctivae, EOM and lids are normal. Pupils are equal, round, and reactive to light.   Neck: Trachea normal and phonation normal. Neck supple.   Cardiovascular: Tachycardia present.    Pulmonary/Chest: Breath sounds normal. Tachypnea noted.   Musculoskeletal: Normal range of motion.         General: Normal range of motion.   Neurological: He is alert and oriented to person, place, and time.   Skin: Skin is warm, dry, intact and not diaphoretic.   Psychiatric: His speech is normal and behavior is normal. Judgment and thought content normal.   Nursing note and vitals reviewed.     XR CHEST PA AND LATERAL  Result Date: 3/27/2025  EXAMINATION: XR CHEST PA AND LATERAL CLINICAL HISTORY: Subacute cough TECHNIQUE: PA and lateral views of the chest were performed. COMPARISON: 03/23/2025. FINDINGS: The heart and mediastinal structures are within normal limits in size and unchanged.  Once again, diffuse interstitial changes are identified throughout both lungs similar to multiple prior examinations and most consistent with chronic interstitial lung disease no acute pulmonary consolidation is identified although a superimposed acute process would be difficult to exclude given the extent of the chronic changes within the lungs.  There is no evidence for pneumothorax or pleural effusions.  Bony structures appear grossly intact.     Chronic interstitial lung disease, similar to the prior exam.  No acute process is appreciated. Electronically signed by: Jayson Sanchez MD Date:    03/27/2025 Time:    12:03    Assessment:     1. Subacute cough        Plan:     Oxygen level 98% on 3L and 98 hr at time of dc  He denies any new shortness of breath, denies any chest pain.  He was here today for this coughing that he has been having for over a month.  Chest x-ray reviewed with the chronic scarring without sign of new focal opacity.    Discussed results/diagnosis/plan with patient in clinic. Strict precautions given to patient to monitor for worsening signs and symptoms. Advised to follow up with PCP or specialist.    Explained side effects of medications prescribed with patient and informed him/her to discontinue use  if he/she has any side effects and to inform UC or PCP if this occurs. All questions answered. Strict ED verses clinic return precautions stressed and given in depth. Advised if symptoms worsens of fail to improve he/she should go to the Emergency Room. Discharge and follow-up instructions given verbally/printed with the patient who expressed understanding and willingness to comply with my recommendations. Patient voiced understanding and in agreement with current treatment plan. Patient exits the exam room in no acute distress. Conversant and engaged during discharge discussion, verbalized understanding.      30 minutes spent on patient's encounter. This includes face to face time and non-face to face time preparing to see the patient (eg, review of tests), obtaining and/or reviewing separately obtained history, documenting clinical information in the electronic or other health record, independently interpreting results and communicating results to the patient/family/caregiver, or care coordinator.    Subacute cough  -     XR CHEST PA AND LATERAL; Future; Expected date: 03/27/2025  -     E-Consult to [facility]  -     guaiFENesin (MUCINEX) 600 mg 12 hr tablet; Take 2 tablets (1,200 mg total) by mouth 2 (two) times daily. for 10 days  Dispense: 40 tablet; Refill: 0  -     benzonatate (TESSALON) 200 MG capsule; Take 1 capsule (200 mg total) by mouth 3 (three) times daily as needed.  Dispense: 30 capsule; Refill: 0          Medical Decision Making:   Clinical Tests:   Radiological Study: Ordered and Reviewed  Urgent Care Management:  Discussed with Dr. Mann a call advised on [facility].  Reviewed with Dr. Reyes, consult to [facility], he reviewed chart, advised safe to discharge home, he has oxygen tanks at home.  We can try some cough medication and he has a follow-up with the heart failure Clinic on Monday.  Other:   I have discussed this case with another health care provider.    Additional MDM:      Heart Failure Score:   COPD = No

## 2025-03-27 NOTE — CONSULTS
Virtual Visit - Urgent Care  Response for E-Consult     Patient Name: Emmanuel Grant  MRN: [id number]  Primary Care Provider: iWlfredo De Souza MD   Requesting Provider: Kathleen Evans NP  E-Consult to [facility]  Consult performed by: José Manuel Reyes II, MD  Consult ordered by: Kathlene Evans NP  Reason for consult: cough      MDM - pt at  for continued cough. On arrival, hypoxic but had run out of home o2.  Pulse ox 96 once back on usual o2.  Cough has been chronic and unchanged. Also chronic sob.  Recent admission for similar symptoms, no acute change in mgmt. D/c'd 2 d ago. Also saw PCP yest for f/u.  Appeared chronically ill but stable.  Although pt w mild tachy cardia today, otherwise symptoms seem stable.  Pt feels fine, and requesting d/c to home.  I see little benefit to repeat ED evaluation and think pt is approp for d/c to home, continue outpt care.    Recommendation: d/c to home    Additional future steps to consider: attempted to enroll in Acute Care at home service, but not eligible from     Total time of Consultation: 20 minute    I did not speak to the requesting provider verbally about this.     *This eConsult is based on the clinical data available to me and is furnished without benefit of a physical examination. The eConsult will need to be interpreted in light of any clinical issues or changes in patient status not available to me at the time of filing this eConsults. Significant changes in patient condition or level of acuity should result in immediate formal consultation and reevaluation. Please alert me if you have further questions.    Thank you for this eConsult referral.     José Manuel Reyes Ii, MD  Virtual Visit - Urgent Care

## 2025-03-27 NOTE — PATIENT INSTRUCTIONS
General Discharge Instructions   PLEASE READ YOUR DISCHARGE INSTRUCTIONS ENTIRELY AS IT CONTAINS IMPORTANT INFORMATION.  If you were prescribed a narcotic or controlled medication, do not drive or operate heavy equipment or machinery while taking these medications.  If you were prescribed antibiotics, please take them to completion.  You must understand that you've received an Urgent Care treatment only and that you may be released before all your medical problems are known or treated. You, the patient, will arrange for follow up care as instructed.    OVER THE COUNTER RECOMMENDATIONS/SUGGESTIONS.    Make sure to stay well hydrated.    Use Nasal Saline to mechanically move any post nasal drip from your eustachian tube or from the back of your throat.    Use warm salt water gargles to ease your throat pain. Warm salt water gargles as needed for sore throat- 1/2 tsp salt to 1 cup warm water, gargle as desired.    Use an antihistamine such as Claritin, Zyrtec or Allegra to dry you out.    Use pseudoephedrine (behind the counter) to decongest. Pseudoephedrine 30 mg up to 240 mg /day. It can raise your blood pressure and give you palpitations.    Use mucinex (guaifenesin) to break up mucous up to 2400mg/day to loosen any mucous.    The mucinex DM pill has a cough suppressant that can be sedating. It can be used at night to stop the tickle at the back of your throat.    You can use Mucinex D (it has guaifenesin and a high dose of pseudoephedrine) in the mornings to help decongest.    Use Afrin in each nare for no longer than 3 days, as it is addictive. It can also dry out your mucous membranes and cause elevated blood pressure. This is especially useful if you are flying.    Use Flonase 1-2 sprays/nostril per day. It is a local acting steroid nasal spray, if you develop a bloody nose, stop using the medication immediately.    Sometimes Nyquil at night is beneficial to help you get some rest, however it is sedating and it  does have an antihistamine, and tylenol.    Honey is a natural cough suppressant that can be used.    Tylenol up to 4,000 mg a day is safe for short periods and can be used for body aches, pain, and fever. However in high doses and prolonged use it can cause liver irritation.    Ibuprofen is a non-steroidal anti-inflammatory that can be used for body aches, pain, and fever.However it can also cause stomach irritation if over used.     Follow up with your PCP or specialty clinic as instructed in the next 2-3 days if not improved or as needed. You can call [phone] to schedule an appointment with appropriate provider.      If you condition worsens, we recommend that you receive another evaluation at the emergency room immediately or contact your primary medical clinic's after hours call service to discuss your concerns.      Please return here or go to the Emergency Department for any concerns or worsening condition.   You can also call [phone] to schedule an appointment with the appropriate provider.    Please return here or go to the Emergency Department for any concerns or worsening of condition.    Thank you for choosing [facility]!    Our goal in the Urgent Care is to always provide outstanding medical care. You may receive a survey by mail or e-mail in the next week regarding your experience today. We would greatly appreciate you completing and returning the survey. Your feedback provides us with a way to recognize our staff who provide very good care, and it helps us learn how to improve when your experience was below our aspiration of excellence.      We appreciate you trusting us with your medical care. We hope you feel better soon. We will be happy to take care of you for all of your future medical needs.    Sincerely,    AUDRA Leiva  Cough   If your condition worsens or fails to improve we recommend that you receive another evaluation at the ER immediately or contact your PCP  "to discuss your concerns or return here. You must understand that you've received an urgent care treatment only and that you may be released before all your medical problems are known or treated. You the patient will arrange for follwp care as instructed. .  Rest and fluids are important  Can use honey with tameka to soothe your throat  Take prescription cough meds (pills) as prescribed; take prescription cough syrup at night as needed for cough.  Do not take both the prescribed cough pills and syrup at the same time or within 6 hours of each other.  Do not take the cough syrup with any other sedative medication as it can can cause drowsiness. Do not operate any heavy machinery, drink or drive while taking the cough syrup.   -  Flonase (fluticasone) is a nasal spray which is available over the counter and may help with your symptoms.   -  If you have hypertension avoid using the "D" which is the decongestant.  Instead you can use Coricidin HBP for cold and cough symptoms.    -  If you just have drainage you can take plain Zyrtec, Claritin or Allegra   -  Tylenol or ibuprofen can also be used as directed for pain unless you have an allergy to them or medical condition such as stomach ulcers, kidney or liver disease or blood thinners etc for which you should not be taking these type of medications.   Please follow up with your primary care doctor or specialist in the next 48-72hrs as needed and if no improvement  If you  smoke, please stop smoking.    "

## 2025-03-28 VITALS
RESPIRATION RATE: 20 BRPM | DIASTOLIC BLOOD PRESSURE: 92 MMHG | SYSTOLIC BLOOD PRESSURE: 165 MMHG | OXYGEN SATURATION: 100 % | TEMPERATURE: 98 F | BODY MASS INDEX: 18.94 KG/M2 | HEART RATE: 67 BPM | HEIGHT: 68 IN | WEIGHT: 125 LBS

## 2025-03-28 LAB
TROPONIN I SERPL DL<=0.01 NG/ML-MCNC: 0.01 NG/ML
TROPONIN I SERPL DL<=0.01 NG/ML-MCNC: <0.006 NG/ML

## 2025-03-28 PROCEDURE — 84484 ASSAY OF TROPONIN QUANT: CPT | Mod: HCNC | Performed by: STUDENT IN AN ORGANIZED HEALTH CARE EDUCATION/TRAINING PROGRAM

## 2025-03-28 PROCEDURE — 94640 AIRWAY INHALATION TREATMENT: CPT | Mod: HCNC

## 2025-03-28 PROCEDURE — 25000242 PHARM REV CODE 250 ALT 637 W/ HCPCS: Mod: HCNC | Performed by: STUDENT IN AN ORGANIZED HEALTH CARE EDUCATION/TRAINING PROGRAM

## 2025-03-28 PROCEDURE — 99900035 HC TECH TIME PER 15 MIN (STAT): Mod: HCNC

## 2025-03-28 PROCEDURE — 27000221 HC OXYGEN, UP TO 24 HOURS: Mod: HCNC

## 2025-03-28 PROCEDURE — 25000003 PHARM REV CODE 250: Mod: HCNC | Performed by: STUDENT IN AN ORGANIZED HEALTH CARE EDUCATION/TRAINING PROGRAM

## 2025-03-28 RX ADMIN — ASPIRIN 325 MG ORAL TABLET 325 MG: 325 PILL ORAL at 12:03

## 2025-03-28 RX ADMIN — IPRATROPIUM BROMIDE AND ALBUTEROL SULFATE 3 ML: 2.5; .5 SOLUTION RESPIRATORY (INHALATION) at 12:03

## 2025-03-28 NOTE — ED TRIAGE NOTES
Pt arrives to ED via POV with c/o SOB and midsternal chest pain ongoing for years but started again tonight; pt has hx of chronic hypoxemic respiratory failure & interstitial lung disease, and always wears 3L NC home oxygen; pt just discharged from hospital 2 days ago; pt AAOx4, frequent cough noted but no acute distress

## 2025-03-28 NOTE — ED PROVIDER NOTES
"Encounter Date: 3/27/2025       History     Chief Complaint   Patient presents with    Shortness of Breath    Chest Pain     Pt presents w/ c/o non radiating, midsternal CP and SOB x2 hrs. Pt states that earlier today his HR dropped in the 40s per home pulse ox reader. Pt denies hx COPD, emphysema. Pt reports PRN home oxygen.      HPI    74-year-old male presenting to the emergency department for evaluation of midsternal chest pain/tightness and shortness for breath for the last 2 hours.  He was concerned about his heart rate dropping into the 40s earlier by his home pulse ox meter.  He has a history of interstitial lung disease, chronic HFrEF and CAD with stent placement.  He is on home O2.  He denies any trauma.  He denies diaphoresis, abdominal pain, nausea, vomiting, syncope, lightheadedness, dysuria, hematuria, leg swelling, pain, fever, chills, cough.  No other mitigating or exacerbating factors.  Patient recently discharged from hospital stay on 03/25/2025.  He was admitted for acute on chronic respiratory failure with hypoxia due to interstitial lung disease exacerbation and acute on chronic diastolic heart failure.  Patient improved and was discharged home.    Review of patient's allergies indicates:   Allergen Reactions    Dapagliflozin      Other reaction(s): Other (See Comments)    Pcn [penicillins]     Linagliptin Other (See Comments)     "it knocked me down", "it almost killed me"    Lisinopril Other (See Comments)     cough    Pantoprazole Hives     Past Medical History:   Diagnosis Date    Acute hypoxic respiratory failure 09/23/2024    Oxygen therapy 24/7 now, continue 2 L oxygen therapy.      BPH (benign prostatic hyperplasia) 02/28/2024    CAD (coronary artery disease)     Sees [facility], h/o stent    Chronic HFrEF (heart failure with reduced ejection fraction) 05/01/2024    Diabetes mellitus     Hypertension     Kidney stone     Stroke     age 62    Type 2 diabetes mellitus without complication, " without long-term current use of insulin 10/08/2021     Past Surgical History:   Procedure Laterality Date    24 HOUR IMPEDANCE PH MONITORING OF ESOPHAGUS IN PATIENT NOT TAKING ACID REDUCING MEDICATIONS N/A 11/13/2024    Procedure: IMPEDANCE PH STUDY, ESOPHAGEAL, 24 HOUR, IN PATIENT NOT TAKING ACID REDUCING MEDICATION;  Surgeon: Stephany Mendoza MD;  Location: [facility] (4TH FLR);  Service: Endoscopy;  Laterality: N/A;  referral dr miguel/ off ppi / prep inst sent to pt via mail  11/6- precall attempted no answer. Unable to [facility]-  11/8 - pt not called, per chart review patient currently inpatient at [place] [address]    CARDIAC CATHETERIZATION      with stent    COLONOSCOPY N/A 03/27/2024    Procedure: COLONOSCOPY;  Surgeon: Areila Castro MD;  Location: [facility];  Service: Endoscopy;  Laterality: N/A;    ESOPHAGEAL MANOMETRY WITH MEASUREMENT OF IMPEDANCE N/A 11/13/2024    Procedure: MANOMETRY, ESOPHAGUS, WITH IMPEDANCE MEASUREMENT;  Surgeon: Stephany Mendoza MD;  Location: [facility] (4TH FLR);  Service: Endoscopy;  Laterality: N/A;    ESOPHAGOGASTRODUODENOSCOPY N/A 03/27/2024    Procedure: EGD (ESOPHAGOGASTRODUODENOSCOPY);  Surgeon: Ariela Castro MD;  Location: [facility];  Service: Endoscopy;  Laterality: N/A;    LEFT HEART CATHETERIZATION Left 9/10/2024    Procedure: Left heart cath;  Surgeon: Jemal Drummond MD;  Location: [facility] CATH LAB;  Service: Cardiology;  Laterality: Left;    SHOULDER SURGERY Right      Family History   Problem Relation Name Age of Onset    Cancer Mother      Heart disease Sister      Colon cancer Sister      COPD Brother      Esophageal cancer Neg Hx       Social History[1]  Review of Systems  See HPI  Physical Exam     Initial Vitals [03/27/25 1911]   BP Pulse Resp Temp SpO2   (!) 142/91 90 18 97.6 °F (36.4 °C) 95 %      MAP       --         Physical Exam    Nursing note and vitals reviewed.  Constitutional: He appears well-developed and well-nourished. He is not  diaphoretic. No distress.   HENT:   Head: Normocephalic and atraumatic.   Right Ear: External ear normal.   Left Ear: External ear normal.   Nose: Nose normal.   Eyes: Conjunctivae are normal. No scleral icterus.   Neck: Neck supple. No tracheal deviation present.   Cardiovascular:  Normal rate, regular rhythm, normal heart sounds and intact distal pulses.     Exam reveals no gallop and no friction rub.       No murmur heard.  Pulmonary/Chest: Breath sounds normal. No respiratory distress. He has no wheezes. He has no rhonchi. He has no rales. He exhibits no tenderness.   Abdominal: Abdomen is soft. Bowel sounds are normal. There is no abdominal tenderness. There is no rebound and no guarding.   Musculoskeletal:         General: No tenderness or edema.      Cervical back: Neck supple.     Neurological: He is alert and oriented to person, place, and time. GCS score is 15.   Skin: Skin is warm and dry.   Psychiatric: His behavior is normal. Thought content normal.   Patient mildly anxious.         ED Course   Procedures  Labs Reviewed   COMPREHENSIVE METABOLIC PANEL - Abnormal       Result Value    Sodium 136      Potassium 4.2      Chloride 101      CO2 20 (*)     Glucose 147 (*)     BUN 35 (*)     Creatinine 1.2      Calcium 9.8      Protein Total 7.7      Albumin 3.6      Bilirubin Total 0.3      ALP 75      AST 34      ALT 59 (*)     Anion Gap 15      eGFR >60     B-TYPE NATRIURETIC PEPTIDE - Abnormal     (*)    CBC WITH DIFFERENTIAL - Abnormal    WBC 11.13      RBC 5.10      HGB 14.2      HCT 41.9      MCV 82      MCH 27.8      MCHC 33.9      RDW 14.5      Platelet Count 341      MPV 9.5      Nucleated RBC 0      Neut % 84.5 (*)     Lymph % 8.5 (*)     Mono % 5.8      Eos % 0.6      Basophil % 0.1      Imm Grans % 0.5      Neut # 9.40 (*)     Lymph # 0.95 (*)     Mono # 0.64      Eos # 0.07      Baso # 0.01      Imm Grans # 0.06 (*)    TROPONIN I - Normal    Troponin-I 0.015     TROPONIN I - Normal     Troponin-I 0.010     TROPONIN I - Normal    Troponin-I <0.006     CBC W/ AUTO DIFFERENTIAL    Narrative:     The following orders were created for panel order CBC auto differential.  Procedure                               Abnormality         Status                     ---------                               -----------         ------                     CBC with Differential[[id number]]       Abnormal            Final result                 Please view results for these tests on the individual orders.          Imaging Results              X-Ray Chest AP Portable (Final result)  Result time 03/27/25 21:34:25      Final result by Elidia Dent MD (03/27/25 21:34:25)                   Impression:      No acute intrathoracic abnormality detected.  Coarse interstitial abnormalities or chronic pulmonary fibrosis.      Electronically signed by: Elidia Dent  Date:    03/27/2025  Time:    21:34               Narrative:    EXAMINATION:  AP PORTABLE CHEST    CLINICAL HISTORY:  Shortness of breath    TECHNIQUE:  AP portable chest radiograph was submitted.    COMPARISON:  03/27/2025 11:47    FINDINGS:  AP portable chest radiograph demonstrates a cardiac silhouette within normal limits.  There is no focal consolidation, pneumothorax, or pleural effusion.  Coarse interstitial lung mild abnormalities are again seen.  The bones are diffusely osteopenic.                                       Medications   albuterol-ipratropium 2.5 mg-0.5 mg/3 mL nebulizer solution 3 mL (3 mLs Nebulization Given 3/28/25 0010)   aspirin tablet 325 mg (325 mg Oral Given 3/28/25 0002)     Medical Decision Making  Amount and/or Complexity of Data Reviewed  Radiology:  Decision-making details documented in ED Course.    Risk  OTC drugs.  Prescription drug management.               ED Course as of 03/28/25 0419   Thu Mar 27, 2025   0107 X-Ray Chest AP Portable  Impression:     No acute intrathoracic abnormality detected.  Coarse interstitial  abnormalities or chronic pulmonary fibrosis.      [CC]   Fri Mar 28, 2025   0102 Differential Diagnosis includes, but is not limited to:  ACS/MI, PE, aortic dissection, pneumothorax, cardiac tamponade, pericarditis/myocarditis, pneumonia, infection/abscess, lung mass, trauma/fracture, costochondritis/pleurisy, MSK pain/contusion, GERD, biliary disease, pancreatitis, anemia  [CC]   0104 EKG: Rate 96, regular rhythm, sinus rhythm, sinus arrhythmia noted, no ST elevations or depressions noted.  Nonspecific ST changes.  Similar to previous EKG.  Interpreted by me, reviewed by me. [CC]   0415 MDM: Patient presenting to the emergency department for evaluation of chest discomfort mild shortness for breath.  Improved after albuterol treatment here.  He has a history of interstitial lung disease.  He noted a chest tightness.  6 hour troponins involving negative.  EKGs nonischemic, doubt ACS.  BNP only slightly elevated, chest x-ray isn't indicative of pulmonary edema, pleural effusion other findings concerning for CHF exacerbation.  Chest x-ray shows chronic pulmonary fibrosis.  Likely etiology of pain and tightness.  Electrolytes within normal limits, doubt derangement.  Mild hyperglycemia in the nonfasting state.  Mild ALT elevation but otherwise liver function within normal limits.  Kidney function within normal limits.  No anemia noted, doubt symptomatic anemia.  He is afebrile, no leukocytosis, no evidence of pneumonia on chest x-ray, doubt pneumonia.  Platelet count is normal.  Patient feels better at bedside.  I believe he is appropriate for discharge to continue follow up in the outpatient setting.  Strict return precautions given. I believe patient is appropriate for discharge and continued outpatient evaulation/treatment.  I discussed with the patient/family the diagnosis, treatment plan, indications for return to the emergency department, and for expected follow-up. The patient/family verbalized an understanding.  The patient/family  asked if there are any questions or concerns. We discuss the case, until all issues are addressed to the patient/family's satisfaction. Patient/family understands and is agreeable to the plan. Patient is stable and ready for discharge.   [CC]      ED Course User Index  [CC] Alexandr Gonzalez MD                           Clinical Impression:  Final diagnoses:  [R06.02] SOB (shortness of breath)  [R07.9] Chest pain, unspecified type (Primary)  [J84.9] Interstitial lung disease          ED Disposition Condition    Discharge Stable          ED Prescriptions    None       Follow-up Information       Follow up With Specialties Details Why Contact Info    Wilfredo De Souza MD Internal Medicine Schedule an appointment as soon as possible for a visit in 3 days  [address] [facility] [address]  [phone]      [facility] Emergency Medicine Go to  If symptoms worsen [address] [facility] [place] [id number] [phone]    Your Cardiologist  Schedule an appointment as soon as possible for a visit                  [1]   Social History  Tobacco Use    Smoking status: Never     Passive exposure: Never    Smokeless tobacco: Never   Substance Use Topics    Alcohol use: No    Drug use: Never        Alexandr Gonzalez MD  03/28/25 0419

## 2025-03-29 ENCOUNTER — OFFICE VISIT (OUTPATIENT)
Dept: URGENT CARE | Facility: CLINIC | Age: 75
End: 2025-03-29
Payer: MEDICARE

## 2025-03-29 ENCOUNTER — HOSPITAL ENCOUNTER (OUTPATIENT)
Facility: HOSPITAL | Age: 75
Discharge: HOME | End: 2025-04-01
Attending: EMERGENCY MEDICINE | Admitting: STUDENT IN AN ORGANIZED HEALTH CARE EDUCATION/TRAINING PROGRAM
Payer: MEDICARE

## 2025-03-29 VITALS
DIASTOLIC BLOOD PRESSURE: 82 MMHG | WEIGHT: 127.19 LBS | HEIGHT: 68 IN | OXYGEN SATURATION: 96 % | SYSTOLIC BLOOD PRESSURE: 130 MMHG | BODY MASS INDEX: 19.28 KG/M2 | HEART RATE: 101 BPM | TEMPERATURE: 98 F | RESPIRATION RATE: 22 BRPM

## 2025-03-29 DIAGNOSIS — R07.9 CHEST PAIN, UNSPECIFIED TYPE: Primary | ICD-10-CM

## 2025-03-29 DIAGNOSIS — K21.9 GASTROESOPHAGEAL REFLUX DISEASE, UNSPECIFIED WHETHER ESOPHAGITIS PRESENT: ICD-10-CM

## 2025-03-29 DIAGNOSIS — R06.02 SHORTNESS OF BREATH: ICD-10-CM

## 2025-03-29 DIAGNOSIS — J84.9 INTERSTITIAL LUNG DISEASE: ICD-10-CM

## 2025-03-29 DIAGNOSIS — R07.9 CHEST PAIN: ICD-10-CM

## 2025-03-29 DIAGNOSIS — R10.9 ABDOMINAL PAIN, UNSPECIFIED ABDOMINAL LOCATION: Primary | ICD-10-CM

## 2025-03-29 PROBLEM — I50.23 ACUTE ON CHRONIC SYSTOLIC (CONGESTIVE) HEART FAILURE: Status: ACTIVE | Noted: 2024-05-01

## 2025-03-29 LAB
ABSOLUTE EOSINOPHIL (OHS): 0.34 K/UL
ABSOLUTE MONOCYTE (OHS): 1 K/UL (ref 0.3–1)
ABSOLUTE NEUTROPHIL COUNT (OHS): 7.66 K/UL (ref 1.8–7.7)
ALBUMIN SERPL BCP-MCNC: 3.3 G/DL (ref 3.5–5.2)
ALP SERPL-CCNC: 62 UNIT/L (ref 40–150)
ALT SERPL W/O P-5'-P-CCNC: 46 UNIT/L (ref 10–44)
ANION GAP (OHS): 11 MMOL/L (ref 8–16)
AST SERPL-CCNC: 27 UNIT/L (ref 11–45)
BASOPHILS # BLD AUTO: 0.01 K/UL
BASOPHILS NFR BLD AUTO: 0.1 %
BILIRUB SERPL-MCNC: 0.4 MG/DL (ref 0.1–1)
BNP SERPL-MCNC: 319 PG/ML (ref 0–99)
BUN SERPL-MCNC: 21 MG/DL (ref 8–23)
CALCIUM SERPL-MCNC: 9.2 MG/DL (ref 8.7–10.5)
CHLORIDE SERPL-SCNC: 102 MMOL/L (ref 95–110)
CO2 SERPL-SCNC: 28 MMOL/L (ref 23–29)
CREAT SERPL-MCNC: 0.9 MG/DL (ref 0.5–1.4)
CTP QC/QA: YES
CTP QC/QA: YES
D DIMER PPP IA.FEU-MCNC: 0.33 MG/L FEU
ERYTHROCYTE [DISTWIDTH] IN BLOOD BY AUTOMATED COUNT: 14.4 % (ref 11.5–14.5)
GFR SERPLBLD CREATININE-BSD FMLA CKD-EPI: >60 ML/MIN/1.73/M2
GLUCOSE SERPL-MCNC: 89 MG/DL (ref 70–110)
HCT VFR BLD AUTO: 39.8 % (ref 40–54)
HGB BLD-MCNC: 13.8 GM/DL (ref 14–18)
HOLD SPECIMEN: NORMAL
IMM GRANULOCYTES # BLD AUTO: 0.05 K/UL (ref 0–0.04)
IMM GRANULOCYTES NFR BLD AUTO: 0.5 % (ref 0–0.5)
LIPASE SERPL-CCNC: 97 U/L (ref 4–60)
LYMPHOCYTES # BLD AUTO: 1.51 K/UL (ref 1–4.8)
MCH RBC QN AUTO: 28 PG (ref 27–50)
MCHC RBC AUTO-ENTMCNC: 34.7 G/DL (ref 32–36)
MCV RBC AUTO: 81 FL (ref 82–98)
NUCLEATED RBC (/100WBC) (OHS): 0 /100 WBC
PLATELET # BLD AUTO: 340 K/UL (ref 150–450)
PMV BLD AUTO: 9.5 FL (ref 9.2–12.9)
POC MOLECULAR INFLUENZA A AGN: NEGATIVE
POC MOLECULAR INFLUENZA B AGN: NEGATIVE
POCT GLUCOSE: 171 MG/DL (ref 70–110)
POTASSIUM SERPL-SCNC: 3.9 MMOL/L (ref 3.5–5.1)
PROT SERPL-MCNC: 6.6 GM/DL (ref 6–8.4)
RBC # BLD AUTO: 4.93 M/UL (ref 4.6–6.2)
RELATIVE EOSINOPHIL (OHS): 3.2 %
RELATIVE LYMPHOCYTE (OHS): 14.3 % (ref 18–48)
RELATIVE MONOCYTE (OHS): 9.5 % (ref 4–15)
RELATIVE NEUTROPHIL (OHS): 72.4 % (ref 38–73)
SARS-COV-2 RDRP RESP QL NAA+PROBE: NEGATIVE
SODIUM SERPL-SCNC: 141 MMOL/L (ref 136–145)
TROPONIN I SERPL DL<=0.01 NG/ML-MCNC: 0.01 NG/ML
WBC # BLD AUTO: 10.57 K/UL (ref 3.9–12.7)

## 2025-03-29 PROCEDURE — 99213 OFFICE O/P EST LOW 20 MIN: CPT | Mod: S$GLB,,, | Performed by: NURSE PRACTITIONER

## 2025-03-29 PROCEDURE — 99285 EMERGENCY DEPT VISIT HI MDM: CPT | Mod: 25,HCNC

## 2025-03-29 PROCEDURE — 36415 COLL VENOUS BLD VENIPUNCTURE: CPT | Mod: HCNC | Performed by: EMERGENCY MEDICINE

## 2025-03-29 PROCEDURE — 85025 COMPLETE CBC W/AUTO DIFF WBC: CPT | Mod: HCNC | Performed by: EMERGENCY MEDICINE

## 2025-03-29 PROCEDURE — 83880 ASSAY OF NATRIURETIC PEPTIDE: CPT | Mod: HCNC | Performed by: EMERGENCY MEDICINE

## 2025-03-29 PROCEDURE — 96374 THER/PROPH/DIAG INJ IV PUSH: CPT | Mod: HCNC

## 2025-03-29 PROCEDURE — 80053 COMPREHEN METABOLIC PANEL: CPT | Mod: HCNC | Performed by: EMERGENCY MEDICINE

## 2025-03-29 PROCEDURE — 94640 AIRWAY INHALATION TREATMENT: CPT | Mod: HCNC,XB

## 2025-03-29 PROCEDURE — 96372 THER/PROPH/DIAG INJ SC/IM: CPT | Performed by: INTERNAL MEDICINE

## 2025-03-29 PROCEDURE — 83690 ASSAY OF LIPASE: CPT | Mod: HCNC | Performed by: EMERGENCY MEDICINE

## 2025-03-29 PROCEDURE — 25000242 PHARM REV CODE 250 ALT 637 W/ HCPCS: Mod: HCNC | Performed by: EMERGENCY MEDICINE

## 2025-03-29 PROCEDURE — 25000003 PHARM REV CODE 250: Mod: HCNC | Performed by: EMERGENCY MEDICINE

## 2025-03-29 PROCEDURE — 99900035 HC TECH TIME PER 15 MIN (STAT): Mod: HCNC

## 2025-03-29 PROCEDURE — 94640 AIRWAY INHALATION TREATMENT: CPT | Mod: HCNC

## 2025-03-29 PROCEDURE — 94761 N-INVAS EAR/PLS OXIMETRY MLT: CPT | Mod: HCNC

## 2025-03-29 PROCEDURE — G0378 HOSPITAL OBSERVATION PER HR: HCPCS | Mod: HCNC

## 2025-03-29 PROCEDURE — 87502 INFLUENZA DNA AMP PROBE: CPT | Mod: HCNC

## 2025-03-29 PROCEDURE — 94799 UNLISTED PULMONARY SVC/PX: CPT | Mod: HCNC

## 2025-03-29 PROCEDURE — 84484 ASSAY OF TROPONIN QUANT: CPT | Mod: HCNC | Performed by: EMERGENCY MEDICINE

## 2025-03-29 PROCEDURE — 93005 ELECTROCARDIOGRAM TRACING: CPT | Mod: HCNC

## 2025-03-29 PROCEDURE — 87635 SARS-COV-2 COVID-19 AMP PRB: CPT | Mod: HCNC | Performed by: EMERGENCY MEDICINE

## 2025-03-29 PROCEDURE — 85379 FIBRIN DEGRADATION QUANT: CPT | Mod: HCNC | Performed by: EMERGENCY MEDICINE

## 2025-03-29 PROCEDURE — 25000242 PHARM REV CODE 250 ALT 637 W/ HCPCS: Mod: HCNC | Performed by: INTERNAL MEDICINE

## 2025-03-29 PROCEDURE — 93010 ELECTROCARDIOGRAM REPORT: CPT | Mod: HCNC,,, | Performed by: INTERNAL MEDICINE

## 2025-03-29 PROCEDURE — 82962 GLUCOSE BLOOD TEST: CPT | Mod: HCNC

## 2025-03-29 PROCEDURE — 25000003 PHARM REV CODE 250: Mod: HCNC | Performed by: INTERNAL MEDICINE

## 2025-03-29 PROCEDURE — 27000221 HC OXYGEN, UP TO 24 HOURS: Mod: HCNC

## 2025-03-29 PROCEDURE — 63600175 PHARM REV CODE 636 W HCPCS: Mod: HCNC | Performed by: INTERNAL MEDICINE

## 2025-03-29 RX ORDER — ARFORMOTEROL TARTRATE 15 UG/2ML
15 SOLUTION RESPIRATORY (INHALATION) 2 TIMES DAILY
Status: DISCONTINUED | OUTPATIENT
Start: 2025-03-29 | End: 2025-04-01 | Stop reason: HOSPADM

## 2025-03-29 RX ORDER — ACETAMINOPHEN 325 MG/1
650 TABLET ORAL EVERY 4 HOURS PRN
Status: DISCONTINUED | OUTPATIENT
Start: 2025-03-29 | End: 2025-03-31

## 2025-03-29 RX ORDER — LANOLIN ALCOHOL/MO/W.PET/CERES
800 CREAM (GRAM) TOPICAL
Status: DISCONTINUED | OUTPATIENT
Start: 2025-03-29 | End: 2025-03-31

## 2025-03-29 RX ORDER — FUROSEMIDE 10 MG/ML
40 INJECTION INTRAMUSCULAR; INTRAVENOUS
Status: DISCONTINUED | OUTPATIENT
Start: 2025-03-30 | End: 2025-03-30

## 2025-03-29 RX ORDER — GLUCAGON 1 MG
1 KIT INJECTION
Status: DISCONTINUED | OUTPATIENT
Start: 2025-03-29 | End: 2025-04-01 | Stop reason: HOSPADM

## 2025-03-29 RX ORDER — GUAIFENESIN AND DEXTROMETHORPHAN HYDROBROMIDE 10; 100 MG/5ML; MG/5ML
10 SYRUP ORAL EVERY 4 HOURS PRN
Status: DISCONTINUED | OUTPATIENT
Start: 2025-03-29 | End: 2025-04-01 | Stop reason: HOSPADM

## 2025-03-29 RX ORDER — PREDNISONE 5 MG/1
10 TABLET ORAL DAILY
Status: DISCONTINUED | OUTPATIENT
Start: 2025-04-05 | End: 2025-04-01 | Stop reason: HOSPADM

## 2025-03-29 RX ORDER — LATANOPROST 50 UG/ML
1 SOLUTION/ DROPS OPHTHALMIC NIGHTLY
Status: DISCONTINUED | OUTPATIENT
Start: 2025-03-29 | End: 2025-04-01 | Stop reason: HOSPADM

## 2025-03-29 RX ORDER — AMOXICILLIN 250 MG
1 CAPSULE ORAL 2 TIMES DAILY PRN
Status: DISCONTINUED | OUTPATIENT
Start: 2025-03-29 | End: 2025-03-31

## 2025-03-29 RX ORDER — HYDROCODONE BITARTRATE AND ACETAMINOPHEN 5; 325 MG/1; MG/1
1 TABLET ORAL EVERY 6 HOURS PRN
Refills: 0 | Status: DISCONTINUED | OUTPATIENT
Start: 2025-03-29 | End: 2025-04-01 | Stop reason: HOSPADM

## 2025-03-29 RX ORDER — PANTOPRAZOLE SODIUM 40 MG/1
40 TABLET, DELAYED RELEASE ORAL DAILY
Status: DISCONTINUED | OUTPATIENT
Start: 2025-03-30 | End: 2025-04-01 | Stop reason: HOSPADM

## 2025-03-29 RX ORDER — SODIUM,POTASSIUM PHOSPHATES 280-250MG
2 POWDER IN PACKET (EA) ORAL
Status: DISCONTINUED | OUTPATIENT
Start: 2025-03-29 | End: 2025-03-31

## 2025-03-29 RX ORDER — OXYBUTYNIN CHLORIDE 5 MG/1
5 TABLET, EXTENDED RELEASE ORAL DAILY
Status: DISCONTINUED | OUTPATIENT
Start: 2025-03-30 | End: 2025-04-01 | Stop reason: HOSPADM

## 2025-03-29 RX ORDER — SUCRALFATE 1 G/1
1 TABLET ORAL
Status: DISCONTINUED | OUTPATIENT
Start: 2025-03-29 | End: 2025-04-01 | Stop reason: HOSPADM

## 2025-03-29 RX ORDER — MYCOPHENOLATE MOFETIL 250 MG/1
1000 CAPSULE ORAL 2 TIMES DAILY
Status: DISCONTINUED | OUTPATIENT
Start: 2025-03-29 | End: 2025-04-01 | Stop reason: HOSPADM

## 2025-03-29 RX ORDER — INSULIN ASPART 100 [IU]/ML
0-5 INJECTION, SOLUTION INTRAVENOUS; SUBCUTANEOUS
Status: DISCONTINUED | OUTPATIENT
Start: 2025-03-29 | End: 2025-04-01 | Stop reason: HOSPADM

## 2025-03-29 RX ORDER — PREDNISONE 5 MG/1
5 TABLET ORAL DAILY
Status: DISCONTINUED | OUTPATIENT
Start: 2025-04-10 | End: 2025-04-01 | Stop reason: HOSPADM

## 2025-03-29 RX ORDER — NITROGLYCERIN 0.4 MG/1
0.4 TABLET SUBLINGUAL EVERY 5 MIN PRN
Status: DISCONTINUED | OUTPATIENT
Start: 2025-03-29 | End: 2025-04-01 | Stop reason: HOSPADM

## 2025-03-29 RX ORDER — FUROSEMIDE 10 MG/ML
40 INJECTION INTRAMUSCULAR; INTRAVENOUS ONCE
Status: COMPLETED | OUTPATIENT
Start: 2025-03-29 | End: 2025-03-29

## 2025-03-29 RX ORDER — NAPROXEN SODIUM 220 MG/1
81 TABLET, FILM COATED ORAL DAILY
Status: DISCONTINUED | OUTPATIENT
Start: 2025-03-30 | End: 2025-04-01 | Stop reason: HOSPADM

## 2025-03-29 RX ORDER — TALC
6 POWDER (GRAM) TOPICAL NIGHTLY PRN
Status: DISCONTINUED | OUTPATIENT
Start: 2025-03-29 | End: 2025-04-01 | Stop reason: HOSPADM

## 2025-03-29 RX ORDER — IBUPROFEN 200 MG
16 TABLET ORAL
Status: DISCONTINUED | OUTPATIENT
Start: 2025-03-29 | End: 2025-04-01 | Stop reason: HOSPADM

## 2025-03-29 RX ORDER — AMLODIPINE BESYLATE 5 MG/1
10 TABLET ORAL DAILY
Status: DISCONTINUED | OUTPATIENT
Start: 2025-03-30 | End: 2025-04-01 | Stop reason: HOSPADM

## 2025-03-29 RX ORDER — PREDNISONE 20 MG/1
40 TABLET ORAL DAILY
Status: COMPLETED | OUTPATIENT
Start: 2025-03-30 | End: 2025-03-30

## 2025-03-29 RX ORDER — PREDNISONE 20 MG/1
20 TABLET ORAL DAILY
Status: DISCONTINUED | OUTPATIENT
Start: 2025-03-31 | End: 2025-04-01 | Stop reason: HOSPADM

## 2025-03-29 RX ORDER — ALUMINUM HYDROXIDE, MAGNESIUM HYDROXIDE, AND SIMETHICONE 1200; 120; 1200 MG/30ML; MG/30ML; MG/30ML
30 SUSPENSION ORAL
Status: COMPLETED | OUTPATIENT
Start: 2025-03-29 | End: 2025-03-29

## 2025-03-29 RX ORDER — BUDESONIDE 0.5 MG/2ML
1 INHALANT ORAL 2 TIMES DAILY
Status: DISCONTINUED | OUTPATIENT
Start: 2025-03-29 | End: 2025-04-01 | Stop reason: HOSPADM

## 2025-03-29 RX ORDER — SODIUM CHLORIDE 0.9 % (FLUSH) 0.9 %
10 SYRINGE (ML) INJECTION
Status: DISCONTINUED | OUTPATIENT
Start: 2025-03-29 | End: 2025-04-01 | Stop reason: HOSPADM

## 2025-03-29 RX ORDER — ACETAMINOPHEN 325 MG/1
650 TABLET ORAL EVERY 8 HOURS PRN
Status: DISCONTINUED | OUTPATIENT
Start: 2025-03-29 | End: 2025-03-31

## 2025-03-29 RX ORDER — SULFAMETHOXAZOLE AND TRIMETHOPRIM 800; 160 MG/1; MG/1
1 TABLET ORAL DAILY
Status: DISCONTINUED | OUTPATIENT
Start: 2025-03-30 | End: 2025-04-01 | Stop reason: HOSPADM

## 2025-03-29 RX ORDER — ENOXAPARIN SODIUM 100 MG/ML
40 INJECTION SUBCUTANEOUS EVERY 24 HOURS
Status: DISCONTINUED | OUTPATIENT
Start: 2025-03-29 | End: 2025-04-01 | Stop reason: HOSPADM

## 2025-03-29 RX ORDER — IPRATROPIUM BROMIDE AND ALBUTEROL SULFATE 2.5; .5 MG/3ML; MG/3ML
3 SOLUTION RESPIRATORY (INHALATION)
Status: COMPLETED | OUTPATIENT
Start: 2025-03-29 | End: 2025-03-29

## 2025-03-29 RX ORDER — IPRATROPIUM BROMIDE AND ALBUTEROL SULFATE 2.5; .5 MG/3ML; MG/3ML
3 SOLUTION RESPIRATORY (INHALATION) EVERY 4 HOURS PRN
Status: DISCONTINUED | OUTPATIENT
Start: 2025-03-29 | End: 2025-03-31

## 2025-03-29 RX ORDER — IBUPROFEN 200 MG
24 TABLET ORAL
Status: DISCONTINUED | OUTPATIENT
Start: 2025-03-29 | End: 2025-04-01 | Stop reason: HOSPADM

## 2025-03-29 RX ORDER — PROCHLORPERAZINE EDISYLATE 5 MG/ML
5 INJECTION INTRAMUSCULAR; INTRAVENOUS EVERY 6 HOURS PRN
Status: DISCONTINUED | OUTPATIENT
Start: 2025-03-29 | End: 2025-03-31

## 2025-03-29 RX ORDER — ONDANSETRON HYDROCHLORIDE 2 MG/ML
4 INJECTION, SOLUTION INTRAVENOUS EVERY 6 HOURS PRN
Status: DISCONTINUED | OUTPATIENT
Start: 2025-03-29 | End: 2025-04-01 | Stop reason: HOSPADM

## 2025-03-29 RX ORDER — FAMOTIDINE 20 MG/1
20 TABLET, FILM COATED ORAL 2 TIMES DAILY PRN
Status: DISCONTINUED | OUTPATIENT
Start: 2025-03-29 | End: 2025-04-01 | Stop reason: HOSPADM

## 2025-03-29 RX ORDER — METOPROLOL SUCCINATE 50 MG/1
50 TABLET, EXTENDED RELEASE ORAL DAILY
Status: DISCONTINUED | OUTPATIENT
Start: 2025-03-30 | End: 2025-04-01 | Stop reason: HOSPADM

## 2025-03-29 RX ORDER — POTASSIUM CHLORIDE 20 MEQ/1
40 TABLET, EXTENDED RELEASE ORAL ONCE
Status: COMPLETED | OUTPATIENT
Start: 2025-03-29 | End: 2025-03-29

## 2025-03-29 RX ORDER — LIDOCAINE HYDROCHLORIDE 20 MG/ML
10 SOLUTION OROPHARYNGEAL
Status: COMPLETED | OUTPATIENT
Start: 2025-03-29 | End: 2025-03-29

## 2025-03-29 RX ORDER — NALOXONE HCL 0.4 MG/ML
0.02 VIAL (ML) INJECTION
Status: DISCONTINUED | OUTPATIENT
Start: 2025-03-29 | End: 2025-04-01 | Stop reason: HOSPADM

## 2025-03-29 RX ADMIN — LIDOCAINE HYDROCHLORIDE 10 ML: 20 SOLUTION OROPHARYNGEAL at 01:03

## 2025-03-29 RX ADMIN — SUCRALFATE 1 G: 1 TABLET ORAL at 09:03

## 2025-03-29 RX ADMIN — ARFORMOTEROL TARTRATE 15 MCG: 15 SOLUTION RESPIRATORY (INHALATION) at 09:03

## 2025-03-29 RX ADMIN — MYCOPHENOLATE MOFETIL 1000 MG: 250 CAPSULE ORAL at 09:03

## 2025-03-29 RX ADMIN — ENOXAPARIN SODIUM 40 MG: 40 INJECTION SUBCUTANEOUS at 09:03

## 2025-03-29 RX ADMIN — FUROSEMIDE 40 MG: 10 INJECTION, SOLUTION INTRAVENOUS at 09:03

## 2025-03-29 RX ADMIN — SACUBITRIL AND VALSARTAN 1 TABLET: 24; 26 TABLET, FILM COATED ORAL at 09:03

## 2025-03-29 RX ADMIN — BUDESONIDE 1 MG: 0.5 INHALANT RESPIRATORY (INHALATION) at 09:03

## 2025-03-29 RX ADMIN — IPRATROPIUM BROMIDE AND ALBUTEROL SULFATE 3 ML: 2.5; .5 SOLUTION RESPIRATORY (INHALATION) at 09:03

## 2025-03-29 RX ADMIN — POTASSIUM CHLORIDE 40 MEQ: 1500 TABLET, EXTENDED RELEASE ORAL at 09:03

## 2025-03-29 RX ADMIN — NITROGLYCERIN 1 INCH: 20 OINTMENT TOPICAL at 04:03

## 2025-03-29 RX ADMIN — FAMOTIDINE 20 MG: 20 TABLET, FILM COATED ORAL at 11:03

## 2025-03-29 RX ADMIN — ALUMINUM HYDROXIDE, MAGNESIUM HYDROXIDE, AND SIMETHICONE 30 ML: 1200; 120; 1200 SUSPENSION ORAL at 01:03

## 2025-03-29 RX ADMIN — IPRATROPIUM BROMIDE AND ALBUTEROL SULFATE 3 ML: 2.5; .5 SOLUTION RESPIRATORY (INHALATION) at 04:03

## 2025-03-29 NOTE — PATIENT INSTRUCTIONS
Please continue taking Omeprazole daily  Take Pepcid twice a day as needed for heartburn  Take Sucrafate with meals and at bedtime    You received lidocaine viscous with Maalox in clinic today.     Please go to nearest emergency department for worsening symptoms.

## 2025-03-29 NOTE — PROGRESS NOTES
"Subjective:      Patient ID: Emmanuel Grant is a 74 y.o. male.    Vitals:  height is 5' 8" (1.727 m) and weight is 57.7 kg (127 lb 3.3 oz). His oral temperature is 97.7 °F (36.5 °C). His blood pressure is 130/82 and his pulse is 101. His respiration is 22 (abnormal) and oxygen saturation is 96%.     Chief Complaint: Abdominal Pain    Pt is here for Abdominal pain, discomfort and burning which started 3 days ago. Pt took Pepto. He was seen here on 3/21 for similar symptoms and was treated with GI cocktail. He is supposed to be taking omeprazole daily, pepcid twice daily as needed for heartburn, and sucralfate with meals and at bedtime. He states he is not sure if he is taking all the pills correctly as he could not find his glasses and cannot read the bottles.    Abdominal Pain  This is a new problem. The current episode started in the past 7 days. The problem has been gradually worsening. The pain is located in the generalized abdominal region. The pain is at a severity of 6/10. The pain is moderate. The quality of the pain is aching and burning. Associated symptoms include constipation. Pertinent negatives include no anorexia, arthralgias, belching, diarrhea, dysuria, fever, flatus, frequency, headaches, hematochezia, hematuria, melena, myalgias, nausea, vomiting or weight loss. Associated symptoms comments: Cough,. The pain is relieved by Nothing. Treatments tried: pepto. The treatment provided no relief. There is no history of abdominal surgery, colon cancer, Crohn's disease, gallstones, GERD, irritable bowel syndrome, pancreatitis, PUD or ulcerative colitis. Patient's medical history does not include kidney stones and UTI.       Constitution: Negative for fever.   Gastrointestinal:  Positive for abdominal pain and constipation. Negative for history of abdominal surgery, nausea, vomiting, diarrhea and bright red blood in stool.   Genitourinary:  Negative for dysuria, frequency and hematuria.   Musculoskeletal:  " Negative for joint pain and muscle ache.   Neurological:  Negative for headaches.      Objective:     Physical Exam   Constitutional: He is oriented to person, place, and time. He appears well-developed.   HENT:   Head: Normocephalic and atraumatic.   Ears:   Right Ear: External ear normal.   Left Ear: External ear normal.   Nose: Nose normal.   Mouth/Throat: Mucous membranes are normal.   Eyes: Conjunctivae and lids are normal.   Neck: Trachea normal. Neck supple.   Cardiovascular: Normal rate, regular rhythm and normal heart sounds.   Pulmonary/Chest: Effort normal and breath sounds normal. No stridor. No respiratory distress. He has no wheezes. He has no rhonchi. He has no rales. He exhibits no tenderness.   Abdominal: Normal appearance and bowel sounds are normal. He exhibits no distension and no mass. Soft. There is generalized abdominal tenderness. There is no rebound and no guarding. No hernia.   Musculoskeletal: Normal range of motion.         General: Normal range of motion.   Neurological: He is alert and oriented to person, place, and time. He has normal strength.   Skin: Skin is warm, dry, intact, not diaphoretic and not pale.   Psychiatric: His speech is normal and behavior is normal. Judgment and thought content normal.   Nursing note and vitals reviewed.      Assessment:     1. Abdominal pain, unspecified abdominal location    2. Gastroesophageal reflux disease, unspecified whether esophagitis present        Plan:     Patient was treated with GI cocktail in clinic. I advised him to take his reflux medications as directed by his PCP. He was able to locate his glasses while in clinic. Pt was given ED precautions of his his abdominal pain worsens.     Abdominal pain, unspecified abdominal location  -     LIDOcaine viscous HCl 2% oral solution 10 mL  -     aluminum-magnesium hydroxide-simethicone 200-200-20 mg/5 mL suspension 30 mL    Gastroesophageal reflux disease, unspecified whether esophagitis  present                Patient Instructions   Please continue taking Omeprazole daily  Take Pepcid twice a day as needed for heartburn  Take Sucrafate with meals and at bedtime    You received lidocaine viscous with Maalox in clinic today.     Please go to nearest emergency department for worsening symptoms.

## 2025-03-30 PROBLEM — R10.84 GENERALIZED ABDOMINAL PAIN: Status: ACTIVE | Noted: 2025-03-30

## 2025-03-30 LAB
ABSOLUTE EOSINOPHIL (OHS): 0.44 K/UL
ABSOLUTE MONOCYTE (OHS): 0.9 K/UL (ref 0.3–1)
ABSOLUTE NEUTROPHIL COUNT (OHS): 6.75 K/UL (ref 1.8–7.7)
ALBUMIN SERPL BCP-MCNC: 3.2 G/DL (ref 3.5–5.2)
ALP SERPL-CCNC: 61 UNIT/L (ref 40–150)
ALT SERPL W/O P-5'-P-CCNC: 41 UNIT/L (ref 10–44)
ANION GAP (OHS): 11 MMOL/L (ref 8–16)
AST SERPL-CCNC: 22 UNIT/L (ref 11–45)
BASOPHILS # BLD AUTO: 0.02 K/UL
BASOPHILS NFR BLD AUTO: 0.2 %
BILIRUB SERPL-MCNC: 0.5 MG/DL (ref 0.1–1)
BUN SERPL-MCNC: 18 MG/DL (ref 8–23)
CALCIUM SERPL-MCNC: 8.8 MG/DL (ref 8.7–10.5)
CHLORIDE SERPL-SCNC: 105 MMOL/L (ref 95–110)
CO2 SERPL-SCNC: 25 MMOL/L (ref 23–29)
CREAT SERPL-MCNC: 0.8 MG/DL (ref 0.5–1.4)
ERYTHROCYTE [DISTWIDTH] IN BLOOD BY AUTOMATED COUNT: 14.5 % (ref 11.5–14.5)
GFR SERPLBLD CREATININE-BSD FMLA CKD-EPI: >60 ML/MIN/1.73/M2
GLUCOSE SERPL-MCNC: 135 MG/DL (ref 70–110)
HCT VFR BLD AUTO: 39.5 % (ref 40–54)
HGB BLD-MCNC: 13.3 GM/DL (ref 14–18)
IMM GRANULOCYTES # BLD AUTO: 0.04 K/UL (ref 0–0.04)
IMM GRANULOCYTES NFR BLD AUTO: 0.4 % (ref 0–0.5)
LIPASE SERPL-CCNC: 108 U/L (ref 4–60)
LYMPHOCYTES # BLD AUTO: 1.48 K/UL (ref 1–4.8)
MAGNESIUM SERPL-MCNC: 1.9 MG/DL (ref 1.6–2.6)
MCH RBC QN AUTO: 27.7 PG (ref 27–50)
MCHC RBC AUTO-ENTMCNC: 33.7 G/DL (ref 32–36)
MCV RBC AUTO: 82 FL (ref 82–98)
NUCLEATED RBC (/100WBC) (OHS): 0 /100 WBC
PLATELET # BLD AUTO: 311 K/UL (ref 150–450)
PMV BLD AUTO: 10 FL (ref 9.2–12.9)
POCT GLUCOSE: 156 MG/DL (ref 70–110)
POCT GLUCOSE: 311 MG/DL (ref 70–110)
POCT GLUCOSE: 347 MG/DL (ref 70–110)
POCT GLUCOSE: 413 MG/DL (ref 70–110)
POCT GLUCOSE: 453 MG/DL (ref 70–110)
POTASSIUM SERPL-SCNC: 4.1 MMOL/L (ref 3.5–5.1)
PROT SERPL-MCNC: 6.6 GM/DL (ref 6–8.4)
RBC # BLD AUTO: 4.81 M/UL (ref 4.6–6.2)
RELATIVE EOSINOPHIL (OHS): 4.6 %
RELATIVE LYMPHOCYTE (OHS): 15.4 % (ref 18–48)
RELATIVE MONOCYTE (OHS): 9.3 % (ref 4–15)
RELATIVE NEUTROPHIL (OHS): 70.1 % (ref 38–73)
SODIUM SERPL-SCNC: 141 MMOL/L (ref 136–145)
TROPONIN I SERPL DL<=0.01 NG/ML-MCNC: <0.006 NG/ML
WBC # BLD AUTO: 9.63 K/UL (ref 3.9–12.7)

## 2025-03-30 PROCEDURE — 99900035 HC TECH TIME PER 15 MIN (STAT): Mod: HCNC

## 2025-03-30 PROCEDURE — 25000003 PHARM REV CODE 250: Mod: HCNC | Performed by: HOSPITALIST

## 2025-03-30 PROCEDURE — 25500020 PHARM REV CODE 255: Mod: HCNC | Performed by: STUDENT IN AN ORGANIZED HEALTH CARE EDUCATION/TRAINING PROGRAM

## 2025-03-30 PROCEDURE — 25000003 PHARM REV CODE 250: Mod: HCNC

## 2025-03-30 PROCEDURE — G0378 HOSPITAL OBSERVATION PER HR: HCPCS | Mod: HCNC

## 2025-03-30 PROCEDURE — 84484 ASSAY OF TROPONIN QUANT: CPT | Mod: HCNC | Performed by: INTERNAL MEDICINE

## 2025-03-30 PROCEDURE — 94761 N-INVAS EAR/PLS OXIMETRY MLT: CPT | Mod: HCNC

## 2025-03-30 PROCEDURE — 96372 THER/PROPH/DIAG INJ SC/IM: CPT | Performed by: INTERNAL MEDICINE

## 2025-03-30 PROCEDURE — 25000003 PHARM REV CODE 250: Mod: HCNC | Performed by: INTERNAL MEDICINE

## 2025-03-30 PROCEDURE — 94640 AIRWAY INHALATION TREATMENT: CPT | Mod: HCNC,XB

## 2025-03-30 PROCEDURE — 85025 COMPLETE CBC W/AUTO DIFF WBC: CPT | Mod: HCNC | Performed by: INTERNAL MEDICINE

## 2025-03-30 PROCEDURE — 63600175 PHARM REV CODE 636 W HCPCS: Mod: HCNC | Performed by: INTERNAL MEDICINE

## 2025-03-30 PROCEDURE — 99214 OFFICE O/P EST MOD 30 MIN: CPT | Mod: HCNC,,, | Performed by: INTERNAL MEDICINE

## 2025-03-30 PROCEDURE — 83690 ASSAY OF LIPASE: CPT | Mod: HCNC | Performed by: INTERNAL MEDICINE

## 2025-03-30 PROCEDURE — 25000242 PHARM REV CODE 250 ALT 637 W/ HCPCS: Mod: HCNC | Performed by: INTERNAL MEDICINE

## 2025-03-30 PROCEDURE — 96376 TX/PRO/DX INJ SAME DRUG ADON: CPT | Mod: HCNC

## 2025-03-30 PROCEDURE — 83735 ASSAY OF MAGNESIUM: CPT | Mod: HCNC | Performed by: INTERNAL MEDICINE

## 2025-03-30 PROCEDURE — 82040 ASSAY OF SERUM ALBUMIN: CPT | Mod: HCNC | Performed by: INTERNAL MEDICINE

## 2025-03-30 PROCEDURE — 27000221 HC OXYGEN, UP TO 24 HOURS: Mod: HCNC

## 2025-03-30 RX ORDER — ALUMINUM HYDROXIDE, MAGNESIUM HYDROXIDE, AND SIMETHICONE 1200; 120; 1200 MG/30ML; MG/30ML; MG/30ML
30 SUSPENSION ORAL
Status: DISCONTINUED | OUTPATIENT
Start: 2025-03-30 | End: 2025-03-30

## 2025-03-30 RX ORDER — DIPHENHYDRAMINE HCL 25 MG
25 CAPSULE ORAL EVERY 6 HOURS PRN
Status: DISCONTINUED | OUTPATIENT
Start: 2025-03-30 | End: 2025-04-01 | Stop reason: HOSPADM

## 2025-03-30 RX ADMIN — FUROSEMIDE 40 MG: 10 INJECTION, SOLUTION INTRAVENOUS at 07:03

## 2025-03-30 RX ADMIN — DIPHENHYDRAMINE HYDROCHLORIDE 25 MG: 25 CAPSULE ORAL at 08:03

## 2025-03-30 RX ADMIN — PREDNISONE 40 MG: 20 TABLET ORAL at 08:03

## 2025-03-30 RX ADMIN — ASPIRIN 81 MG CHEWABLE TABLET 81 MG: 81 TABLET CHEWABLE at 08:03

## 2025-03-30 RX ADMIN — IPRATROPIUM BROMIDE AND ALBUTEROL SULFATE 3 ML: 2.5; .5 SOLUTION RESPIRATORY (INHALATION) at 08:03

## 2025-03-30 RX ADMIN — ENOXAPARIN SODIUM 40 MG: 40 INJECTION SUBCUTANEOUS at 04:03

## 2025-03-30 RX ADMIN — OXYBUTYNIN CHLORIDE 5 MG: 5 TABLET, EXTENDED RELEASE ORAL at 10:03

## 2025-03-30 RX ADMIN — ACETAMINOPHEN 650 MG: 325 TABLET ORAL at 08:03

## 2025-03-30 RX ADMIN — MYCOPHENOLATE MOFETIL 1000 MG: 250 CAPSULE ORAL at 10:03

## 2025-03-30 RX ADMIN — METOPROLOL SUCCINATE 50 MG: 50 TABLET, EXTENDED RELEASE ORAL at 08:03

## 2025-03-30 RX ADMIN — SACUBITRIL AND VALSARTAN 1 TABLET: 24; 26 TABLET, FILM COATED ORAL at 08:03

## 2025-03-30 RX ADMIN — Medication 6 MG: at 12:03

## 2025-03-30 RX ADMIN — BUDESONIDE 1 MG: 0.5 INHALANT RESPIRATORY (INHALATION) at 07:03

## 2025-03-30 RX ADMIN — BUDESONIDE 1 MG: 0.5 INHALANT RESPIRATORY (INHALATION) at 08:03

## 2025-03-30 RX ADMIN — SUCRALFATE 1 G: 1 TABLET ORAL at 04:03

## 2025-03-30 RX ADMIN — LATANOPROST 1 DROP: 50 SOLUTION OPHTHALMIC at 03:03

## 2025-03-30 RX ADMIN — MYCOPHENOLATE MOFETIL 1000 MG: 250 CAPSULE ORAL at 09:03

## 2025-03-30 RX ADMIN — SUCRALFATE 1 G: 1 TABLET ORAL at 12:03

## 2025-03-30 RX ADMIN — SUCRALFATE 1 G: 1 TABLET ORAL at 08:03

## 2025-03-30 RX ADMIN — ARFORMOTEROL TARTRATE 15 MCG: 15 SOLUTION RESPIRATORY (INHALATION) at 08:03

## 2025-03-30 RX ADMIN — AMLODIPINE BESYLATE 10 MG: 5 TABLET ORAL at 08:03

## 2025-03-30 RX ADMIN — INSULIN ASPART 3 UNITS: 100 INJECTION, SOLUTION INTRAVENOUS; SUBCUTANEOUS at 08:03

## 2025-03-30 RX ADMIN — PANTOPRAZOLE SODIUM 40 MG: 40 TABLET, DELAYED RELEASE ORAL at 08:03

## 2025-03-30 RX ADMIN — SULFAMETHOXAZOLE AND TRIMETHOPRIM 1 TABLET: 800; 160 TABLET ORAL at 08:03

## 2025-03-30 RX ADMIN — ALUMINUM HYDROXIDE, MAGNESIUM HYDROXIDE, AND DIMETHICONE 30 ML: 200; 20; 200 SUSPENSION ORAL at 04:03

## 2025-03-30 RX ADMIN — INSULIN ASPART 4 UNITS: 100 INJECTION, SOLUTION INTRAVENOUS; SUBCUTANEOUS at 12:03

## 2025-03-30 RX ADMIN — SUCRALFATE 1 G: 1 TABLET ORAL at 07:03

## 2025-03-30 RX ADMIN — IOHEXOL 75 ML: 350 INJECTION, SOLUTION INTRAVENOUS at 02:03

## 2025-03-30 RX ADMIN — HYDROCODONE BITARTRATE AND ACETAMINOPHEN 1 TABLET: 5; 325 TABLET ORAL at 05:03

## 2025-03-30 RX ADMIN — ARFORMOTEROL TARTRATE 15 MCG: 15 SOLUTION RESPIRATORY (INHALATION) at 07:03

## 2025-03-30 RX ADMIN — ALUMINUM HYDROXIDE, MAGNESIUM HYDROXIDE, AND DIMETHICONE 30 ML: 200; 20; 200 SUSPENSION ORAL at 08:03

## 2025-03-30 NOTE — ASSESSMENT & PLAN NOTE
This is likely the cause of the patient's shortness of breath of late.  Follow with diuresis.    Of note the patient recently had an echocardiogram and a stress test with the latter only demonstrating a fixed defect.  Repeat echocardiogram not ordered.  Consider such if a.m. troponin is positive.        Patient has Systolic (HFrEF) heart failure that is Acute on chronic. On presentation their CHF was decompensated. Evidence of decompensated CHF on presentation includes: edema, crackles on lung auscultation, and orthopnea. The etiology of their decompensation is likely due to unclear reason. Most recent BNP and echo results are listed below.  Recent Labs     03/27/25 2025 03/29/25  1631   * 319*     Latest ECHO  Results for orders placed during the hospital encounter of 03/16/25    Echo    Interpretation Summary    Left Ventricle: The left ventricle is normal in size. There is mildly reduced systolic function with a visually estimated ejection fraction of 40 - 45%.    Right Ventricle: The right ventricle is normal in size. Systolic function is normal.    Aortic Valve: There is mild aortic valve sclerosis.    Mitral Valve: There is moderate mitral annular calcification.    Aorta: Aortic root is mildly dilated measuring 3.73 cm.    Pulmonary Artery: The estimated pulmonary artery systolic pressure is 30 mmHg.    IVC/SVC: Normal venous pressure at 3 mmHg.    Current Heart Failure Medications  furosemide injection 40 mg, Once, Intravenous  metoprolol succinate (TOPROL-XL) 24 hr tablet 50 mg, Daily, Oral  sacubitriL-valsartan 24-26 mg per tablet 1 tablet, 2 times daily, Oral  furosemide injection 40 mg, 2 times daily before meals, Intravenous    Plan  - Monitor strict I&Os and daily weights.    - Place on telemetry  - Low sodium diet  - Place on fluid restriction of 2 L.   - Cardiology has not been consulted  - The patient's volume status is stable but not at their baseline as indicated by edema, crackles on lung  auscultation, and orthopnea  -

## 2025-03-30 NOTE — HPI
73 yo male with history of pulmonary fibrosis on Nintedanib and mmf and chronic hypoxic respiratory failure on home o2 at 3 LPM. He presents to [facility] to intermittent chest pressure over the past week associated with abdominal pain. He reports diffuse abdominal pain that comes and goes. He denies any nausea or diarrhea.  He has had reduced oral intake but has continued to have bowel movements.    Pulmonary was consulted for history of pulmonary fibrosis. He has had several admissions for shortness of breath in the past year - Hospitalization 3/23-3/25; 3/16-3/27; 1/16-/120; 12/12-12/16. He follows with Dr. Schmitt and Dr. Waldrop. He was started on Ofev on Feb 21 during his outpatient visit. He has also been on MMF 1000 mg twice a day. He has been on steroids with intermittent taper. Currently, he is on prednisone 40 mg.    He reports dyspnea is chronic and close to baseline. He does however report midsternal pressure like pain and believes it starts in abdominal area. This pain comes and goes. He is using 3 LPM of nasal cannula. His prior POC had malfunctioned and he is currently using portable tanks at 3 LPM. He reports compliances with his medications.    He lives by himself, has family close by. He has a son who is a surgeon currently living out of state.

## 2025-03-30 NOTE — ASSESSMENT & PLAN NOTE
Patient with Hypoxic Respiratory failure which is Chronic.  he is on home oxygen at 3 LPM. Supplemental oxygen was provided and noted-    No increase in O2 need  - still on 3 LPM with SpO2 in high 90's

## 2025-03-30 NOTE — ASSESSMENT & PLAN NOTE
Patient with Hypoxic Respiratory failure which is Chronic.  he is on home oxygen at 3 LPM. Supplemental oxygen was provided and noted-      .

## 2025-03-30 NOTE — ASSESSMENT & PLAN NOTE
Patient's blood pressure range in the last 24 hours was: BP  Min: 117/72  Max: 167/100.The patient's inpatient anti-hypertensive regimen is listed below:  Current Antihypertensives  furosemide injection 40 mg, Once, Intravenous  amLODIPine tablet 10 mg, Daily, Oral  metoprolol succinate (TOPROL-XL) 24 hr tablet 50 mg, Daily, Oral  nitroGLYCERIN SL tablet 0.4 mg, Every 5 min PRN, Sublingual  furosemide injection 40 mg, 2 times daily before meals, Intravenous    Plan  - BP is controlled, no changes needed to their regimen  -

## 2025-03-30 NOTE — PROGRESS NOTES
[facility] Emergency Dept  [facility] Medicine  Progress Note    Patient Name: Emmanuel Grant  MRN: [id number]  Patient Class: OP- Observation   Admission Date: 3/29/2025  Length of Stay: 0 days  Attending Physician: Pepe Newman MD  Primary Care Provider: Wilfredo De Souza MD        Subjective     Principal Problem:IPF (idiopathic pulmonary fibrosis)        HPI:  74-year-old male with history of hypertension, diabetes, cardiomyopathy and pulmonary fibrosis requiring 3 L by nasal cannula at baseline on Ofev, MMF and bactrim prophylaxis who presents with intermittent chest pressure over the past weeks for which a stress test occurred and demonstrated a fixed defect who presents with 2 days of worsening shortness of breath in addition to his newly developing abdominal discomfort.    As stated, for the past weeks the patient has been having intermittent chest discomfort.  Over the past days however he has had shortness of breath which seems to have worsened with exertion.  No fevers.  No new or worsening lower extremity edema.  No new productive cough.    Regarding his abdominal discomfort, it is diffuse.  He has had reduced oral intake but has continued to have bowel movements.  At the time of my interview it has essentially resolved and he is asking for food.        Overview/Hospital Course:  No notes on file    Interval History: Euvolemic appearing on exam. Satting % on home 3L NC. Complaining that he is very uncomfortable, though having difficulty expressing what's bothering him.    Review of Systems   Constitutional:  Positive for activity change. Negative for appetite change, chills, diaphoresis and fever.   Respiratory:  Positive for chest tightness and shortness of breath.    Cardiovascular:  Negative for chest pain, palpitations and leg swelling.   Gastrointestinal:  Positive for abdominal pain. Negative for constipation, diarrhea, nausea and vomiting.   Genitourinary:  Negative for dysuria, frequency  and urgency.   Neurological:  Negative for dizziness, syncope, weakness and light-headedness.     Objective:     Vital Signs (Most Recent):  Temp: 97.5 °F (36.4 °C) (03/30/25 0405)  Pulse: 98 (03/30/25 0735)  Resp: 18 (03/30/25 0735)  BP: 120/67 (03/30/25 0602)  SpO2: 99 % (03/30/25 0735) Vital Signs (24h Range):  Temp:  [97.4 °F (36.3 °C)-98.4 °F (36.9 °C)] 97.5 °F (36.4 °C)  Pulse:  [] 98  Resp:  [15-32] 18  SpO2:  [96 %-100 %] 99 %  BP: (117-167)/() 120/67     Weight: 59 kg (130 lb)  Body mass index is 19.77 kg/m².    Intake/Output Summary (Last 24 hours) at 3/30/2025 0818  Last data filed at 3/30/2025 0510  Gross per 24 hour   Intake --   Output 1700 ml   Net -1700 ml         Physical Exam  Vitals and nursing note reviewed.   Constitutional:       Appearance: He is ill-appearing (chronically).   HENT:      Head: Normocephalic.      Mouth/Throat:      Pharynx: Oropharynx is clear.   Cardiovascular:      Rate and Rhythm: Normal rate and regular rhythm.   Pulmonary:      Effort: No respiratory distress.      Breath sounds: No wheezing or rales.      Comments: On home 3L NC. Coarse breath sounds noted bilaterally.  Abdominal:      General: There is no distension.      Palpations: Abdomen is soft.      Tenderness: There is no abdominal tenderness.   Musculoskeletal:      Right lower leg: No edema.      Left lower leg: No edema.   Skin:     General: Skin is warm and dry.   Neurological:      General: No focal deficit present.      Mental Status: Mental status is at baseline.   Psychiatric:         Mood and Affect: Mood is anxious.               Significant Labs: All pertinent labs within the past 24 hours have been reviewed.    Significant Imaging: I have reviewed all pertinent imaging results/findings within the past 24 hours.      Assessment & Plan  Acute on chronic systolic (congestive) heart failure  Patient recently had an echocardiogram and a stress test with the latter only demonstrating a fixed  defect.  Repeat echocardiogram not ordered.      Patient has Systolic (HFrEF) heart failure that is Acute on chronic. On presentation their CHF was decompensated. Evidence of decompensated CHF on presentation includes: edema, crackles on lung auscultation, and orthopnea. The etiology of their decompensation is likely due to unclear reason . Most recent BNP and echo results are listed below.  Recent Labs     03/27/25 2025 03/29/25  1631   * 319*     Latest ECHO  Results for orders placed during the hospital encounter of 03/16/25    Echo    Interpretation Summary    Left Ventricle: The left ventricle is normal in size. There is mildly reduced systolic function with a visually estimated ejection fraction of 40 - 45%.    Right Ventricle: The right ventricle is normal in size. Systolic function is normal.    Aortic Valve: There is mild aortic valve sclerosis.    Mitral Valve: There is moderate mitral annular calcification.    Aorta: Aortic root is mildly dilated measuring 3.73 cm.    Pulmonary Artery: The estimated pulmonary artery systolic pressure is 30 mmHg.    IVC/SVC: Normal venous pressure at 3 mmHg.    Current Heart Failure Medications  metoprolol succinate (TOPROL-XL) 24 hr tablet 50 mg, Daily, Oral  sacubitriL-valsartan 24-26 mg per tablet 1 tablet, 2 times daily, Oral    Plan  - Monitor strict I&Os and daily weights.    - Place on telemetry  - Low sodium diet  - Place on fluid restriction of 2 L.   - Cardiology has not been consulted  - The patient's volume status is at their baseline. No lower extremity edema or rales noted on exam.       IPF (idiopathic pulmonary fibrosis)  Symptoms likely attributed to progressively worsening ILD.   Continue MMF, Ofev.  He is currently on a prednisone taper which has been continued  On Bactrim prophylaxis.  Ordered daily; it needs to be confirmed whether this should be daily or 3 times weekly.  Patient instructed as family to bring in Ofev as not on formulary  Plan  to consult Pulmonology for further recommendations on medical management.    Palliative care consult on Monday to discuss progressively worsening disease and goals of care.   Type 2 diabetes mellitus without complication, without long-term current use of insulin    Insulin sliding scale  Coronary artery disease involving native coronary artery of native heart with angina pectoris  Continue home regimen  GERD (gastroesophageal reflux disease)    Continue PPI  Essential hypertension  Patient's blood pressure range in the last 24 hours was: BP  Min: 104/70  Max: 167/100.The patient's inpatient anti-hypertensive regimen is listed below:  Current Antihypertensives  amLODIPine tablet 10 mg, Daily, Oral  metoprolol succinate (TOPROL-XL) 24 hr tablet 50 mg, Daily, Oral  nitroGLYCERIN SL tablet 0.4 mg, Every 5 min PRN, Sublingual    Plan  - BP is controlled, no changes needed to their regimen  -   Chest pain  Troponin trend negative. EKG without acute changes.   Recent stress test with severe intensity, fixed perfusion abnormality consistent with scar in the anteroapical wall.     Immunosuppression due to drug therapy    On daily Bactrim.  It should be confirmed whether or this should be daily or 3 times weekly dosing.  Chronic hypoxemic respiratory failure  Patient with Hypoxic Respiratory failure which is Chronic.  he is on home oxygen at 3 LPM. Supplemental oxygen was provided and noted-      .     VTE Risk Mitigation (From admission, onward)           Ordered     enoxaparin injection 40 mg  Daily         03/29/25 2039     Place sequential compression device  Until discontinued         03/29/25 2039     IP VTE HIGH RISK PATIENT  Once         03/29/25 2039     Place sequential compression device  Until discontinued         03/29/25 2039                    Discharge Planning   DERIC:      Code Status: Full Code   Medical Readiness for Discharge Date:                            Umang Miller PA-C  Department of Hospital  Medicine   [facility] - Emergency Dept

## 2025-03-30 NOTE — CONSULTS
RD providing remote coverage.   Consulted fluid and sodium restrictions diet education   - attached clinical reference materials handouts to discharge documents. On site RD to provide in-person education as warranted.      Please re-consult as needed.  Thank you.   Laura Ibrahim, ANDREWSN, LDN

## 2025-03-30 NOTE — HPI
74-year-old male with history of hypertension, diabetes, cardiomyopathy and pulmonary fibrosis requiring 3 L by nasal cannula at baseline on Ofev, MMF and bactrim prophylaxis who presents with intermittent chest pressure over the past weeks for which a stress test occurred and demonstrated a fixed defect who presents with 2 days of worsening shortness of breath in addition to his newly developing abdominal discomfort.    As stated, for the past weeks the patient has been having intermittent chest discomfort.  Over the past days however he has had shortness of breath which seems to have worsened with exertion.  No fevers.  No new or worsening lower extremity edema.  No new productive cough.    Regarding his abdominal discomfort, it is diffuse.  He has had reduced oral intake but has continued to have bowel movements.  At the time of my interview it has essentially resolved and he is asking for food.

## 2025-03-30 NOTE — ED NOTES
Report received from KANDI Wang; pt resting calmly awake in bed connected to continuous cardiac monitoring, BP cuff, pulse ox, & wearing 3L NC (home oxygen); pt aware of current plan of care for hospital admission, pending/delayed room assignment upstairs, and boarder status in the ED; no distress noted.

## 2025-03-30 NOTE — SUBJECTIVE & OBJECTIVE
Past Medical History:   Diagnosis Date    Acute hypoxic respiratory failure 09/23/2024    Oxygen therapy 24/7 now, continue 2 L oxygen therapy.      BPH (benign prostatic hyperplasia) 02/28/2024    CAD (coronary artery disease)     Sees [facility], h/o stent    Chronic HFrEF (heart failure with reduced ejection fraction) 05/01/2024    Diabetes mellitus     Hypertension     Kidney stone     Stroke     age 62    Type 2 diabetes mellitus without complication, without long-term current use of insulin 10/08/2021       Past Surgical History:   Procedure Laterality Date    24 HOUR IMPEDANCE PH MONITORING OF ESOPHAGUS IN PATIENT NOT TAKING ACID REDUCING MEDICATIONS N/A 11/13/2024    Procedure: IMPEDANCE PH STUDY, ESOPHAGEAL, 24 HOUR, IN PATIENT NOT TAKING ACID REDUCING MEDICATION;  Surgeon: Stephany Mendoza MD;  Location: [facility] (4TH FLR);  Service: Endoscopy;  Laterality: N/A;  referral dr miguel/ off ppi / prep inst sent to pt via mail  11/6- precall attempted no answer. Unable to [facility]-  11/8 - pt not called, per chart review patient currently inpatient at [place] [id number]    CARDIAC CATHETERIZATION      with stent    COLONOSCOPY N/A 03/27/2024    Procedure: COLONOSCOPY;  Surgeon: Ariela Castro MD;  Location: [facility];  Service: Endoscopy;  Laterality: N/A;    ESOPHAGEAL MANOMETRY WITH MEASUREMENT OF IMPEDANCE N/A 11/13/2024    Procedure: MANOMETRY, ESOPHAGUS, WITH IMPEDANCE MEASUREMENT;  Surgeon: Stephany Mendoza MD;  Location: [facility] (4TH FLR);  Service: Endoscopy;  Laterality: N/A;    ESOPHAGOGASTRODUODENOSCOPY N/A 03/27/2024    Procedure: EGD (ESOPHAGOGASTRODUODENOSCOPY);  Surgeon: Ariela Castro MD;  Location: [facility] ENDO;  Service: Endoscopy;  Laterality: N/A;    LEFT HEART CATHETERIZATION Left 9/10/2024    Procedure: Left heart cath;  Surgeon: Jemal Drummond MD;  Location: [facility] CATH LAB;  Service: Cardiology;  Laterality: Left;    SHOULDER SURGERY Right        Review of patient's allergies  "indicates:   Allergen Reactions    Dapagliflozin      Other reaction(s): Other (See Comments)    Pcn [penicillins]     Linagliptin Other (See Comments)     "it knocked me down", "it almost killed me"    Lisinopril Other (See Comments)     cough    Pantoprazole Hives       Current Facility-Administered Medications on File Prior to Encounter   Medication    [COMPLETED] aluminum-magnesium hydroxide-simethicone 200-200-20 mg/5 mL suspension 30 mL    [COMPLETED] LIDOcaine viscous HCl 2% oral solution 10 mL     Current Outpatient Medications on File Prior to Encounter   Medication Sig    albuterol-ipratropium (DUO-NEB) 2.5 mg-0.5 mg/3 mL nebulizer solution Take 3 mLs by nebulization every 4 (four) hours. Rescue    amLODIPine (NORVASC) 10 MG tablet Take 1 tablet (10 mg total) by mouth once daily.    arformoteroL (BROVANA) 15 mcg/2 mL Nebu Take 2 mLs (15 mcg total) by nebulization 2 (two) times daily. Controller    aspirin 81 MG Chew Take 81 mg by mouth once daily.    benzonatate (TESSALON) 200 MG capsule Take 1 capsule (200 mg total) by mouth 3 (three) times daily as needed.    blood-glucose sensor (FREESTYLE RAMBO 3 PLUS SENSOR) Blanche Change every 15 days    budesonide (PULMICORT) 0.5 mg/2 mL nebulizer solution Take 4 mLs (1 mg total) by nebulization 2 (two) times daily. Controller    cholecalciferol, vitamin D3, (VITAMIN D3) 50 mcg (2,000 unit) Cap capsule Take 1 capsule by mouth once daily.    dextromethorphan-guaiFENesin  mg/5 ml (ROBITUSSIN-DM)  mg/5 mL liquid Take 10 mLs by mouth every 4 (four) hours as needed (cough).    famotidine (PEPCID) 20 MG tablet Take 1 tablet (20 mg total) by mouth 2 (two) times daily as needed for Heartburn.    furosemide (LASIX) 20 MG tablet Take 1 tablet (20 mg total) by mouth once daily.    glimepiride (AMARYL) 4 MG tablet Take 1 tablet (4 mg total) by mouth before breakfast.    guaiFENesin (MUCINEX) 600 mg 12 hr tablet Take 2 tablets (1,200 mg total) by mouth 2 (two) times " "daily. for 10 days    insulin aspart U-100 (NOVOLOG FLEXPEN U-100 INSULIN) 100 unit/mL (3 mL) InPn pen Inject as needed before meals: 180-230=+1, 231-280=+2, 281-330=+3, 331-380=+4, over 380=+5 units    latanoprost, PF, 0.005 % Drop 1 drop into affected eye in the evening Ophthalmic Once a day    metFORMIN (GLUCOPHAGE-XR) 500 MG ER 24hr tablet Take 1 tablet (500 mg total) by mouth 2 (two) times daily with meals.    metoprolol succinate (TOPROL-XL) 50 MG 24 hr tablet Take 1 tablet (50 mg total) by mouth once daily.    mirabegron (MYRBETRIQ) 25 mg Tb24 ER tablet Take 1 tablet (25 mg total) by mouth once daily.    mycophenolate (CELLCEPT) 250 mg Cap Take 4 capsules (1,000 mg total) by mouth 2 (two) times daily.    nintedanib (OFEV) 150 mg Cap Take 1 capsule (150 mg total) by mouth 2 (two) times a day.    nitroGLYCERIN (NITROSTAT) 0.3 MG SL tablet Place 1 tablet (0.3 mg total) under the tongue every 5 (five) minutes as needed for Chest pain.    omeprazole (PRILOSEC) 40 MG capsule Take 1 capsule (40 mg total) by mouth once daily.    ONETOUCH DELICA PLUS LANCET 33 gauge Misc Apply topically 3 (three) times daily.    pen needle, diabetic 32 gauge x 5/32" Ndle 1 each by Misc.(Non-Drug; Combo Route) route once daily.    predniSONE (DELTASONE) 10 MG tablet Take 4 tablets (40 mg total) by mouth once daily for 5 days, THEN 2 tablets (20 mg total) once daily for 5 days, THEN 1 tablet (10 mg total) once daily for 5 days, THEN 0.5 tablets (5 mg total) once daily for 5 days. Take 40 mg for 5 days, 20 mg for 5 days and 10 mg for 5 day.    rosuvastatin (CRESTOR) 20 MG tablet Take 1 tablet (20 mg total) by mouth once daily.    sacubitriL-valsartan (ENTRESTO) 24-26 mg per tablet Take 1 tablet by mouth 2 (two) times daily.    simethicone (MYLICON) 80 MG chewable tablet Take 1 tablet (80 mg total) by mouth 3 (three) times daily as needed for Flatulence.    sucralfate (CARAFATE) 1 gram tablet Take 1 tablet (1 g total) by mouth 4 (four) " times daily before meals and nightly.    sulfamethoxazole-trimethoprim 800-160mg (BACTRIM DS) 800-160 mg Tab Take 1 tablet by mouth once daily.     Family History       Problem Relation (Age of Onset)    COPD Brother    Cancer Mother    Colon cancer Sister    Heart disease Sister          Tobacco Use    Smoking status: Never     Passive exposure: Never    Smokeless tobacco: Never   Substance and Sexual Activity    Alcohol use: No    Drug use: Never    Sexual activity: Not Currently     Review of Systems   Constitutional:  Positive for activity change and appetite change. Negative for fever.   Respiratory:  Positive for chest tightness and shortness of breath. Negative for cough and wheezing.    Cardiovascular:  Positive for chest pain. Negative for palpitations and leg swelling.   Gastrointestinal:  Positive for abdominal pain and nausea. Negative for abdominal distention.   Neurological:  Negative for seizures, syncope, speech difficulty, weakness, light-headedness and headaches.     Objective:     Vital Signs (Most Recent):  Temp: 97.4 °F (36.3 °C) (03/29/25 1507)  Pulse: 88 (03/29/25 2000)  Resp: (!) 30 (03/29/25 1732)  BP: 117/72 (03/29/25 2000)  SpO2: 97 % (03/29/25 2000) Vital Signs (24h Range):  Temp:  [97.4 °F (36.3 °C)-97.7 °F (36.5 °C)] 97.4 °F (36.3 °C)  Pulse:  [] 88  Resp:  [19-30] 30  SpO2:  [96 %-100 %] 97 %  BP: (117-167)/() 117/72     Weight: 59 kg (130 lb)  Body mass index is 19.77 kg/m².     Physical Exam         NAD, A/O 3   RRR   Crackles at bases   Soft nontender nondistended, bowel sounds present   No edema     Significant Labs: All pertinent labs within the past 24 hours have been reviewed.  CBC:   Recent Labs   Lab 03/29/25  1631   WBC 10.57   HGB 13.8*   HCT 39.8*        CMP:   Recent Labs   Lab 03/29/25  1631      K 3.9      CO2 28   BUN 21   CREATININE 0.9   CALCIUM 9.2   ALBUMIN 3.3*   BILITOT 0.4   ALKPHOS 62   AST 27   ALT 46*   ANIONGAP 11     Magnesium:  "No results for input(s): "MG" in the last 48 hours.    Significant Imaging: I have reviewed all pertinent imaging results/findings within the past 24 hours.  "

## 2025-03-30 NOTE — SUBJECTIVE & OBJECTIVE
Past Medical History:   Diagnosis Date    Acute hypoxic respiratory failure 09/23/2024    Oxygen therapy 24/7 now, continue 2 L oxygen therapy.      BPH (benign prostatic hyperplasia) 02/28/2024    CAD (coronary artery disease)     Sees [facility], h/o stent    Chronic HFrEF (heart failure with reduced ejection fraction) 05/01/2024    Diabetes mellitus     Hypertension     Kidney stone     Stroke     age 62    Type 2 diabetes mellitus without complication, without long-term current use of insulin 10/08/2021       Past Surgical History:   Procedure Laterality Date    24 HOUR IMPEDANCE PH MONITORING OF ESOPHAGUS IN PATIENT NOT TAKING ACID REDUCING MEDICATIONS N/A 11/13/2024    Procedure: IMPEDANCE PH STUDY, ESOPHAGEAL, 24 HOUR, IN PATIENT NOT TAKING ACID REDUCING MEDICATION;  Surgeon: Stephany Mendoza MD;  Location: [facility] (4TH FLR);  Service: Endoscopy;  Laterality: N/A;  referral dr miguel/ off ppi / prep inst sent to pt via mail  11/6- precall attempted no answer. Unable to [facility]-  11/8 - pt not called, per chart review patient currently inpatient at [place] [address]    CARDIAC CATHETERIZATION      with stent    COLONOSCOPY N/A 03/27/2024    Procedure: COLONOSCOPY;  Surgeon: Ariela Castro MD;  Location: [facility];  Service: Endoscopy;  Laterality: N/A;    ESOPHAGEAL MANOMETRY WITH MEASUREMENT OF IMPEDANCE N/A 11/13/2024    Procedure: MANOMETRY, ESOPHAGUS, WITH IMPEDANCE MEASUREMENT;  Surgeon: Stephany Mendoza MD;  Location: [facility] (4TH FLR);  Service: Endoscopy;  Laterality: N/A;    ESOPHAGOGASTRODUODENOSCOPY N/A 03/27/2024    Procedure: EGD (ESOPHAGOGASTRODUODENOSCOPY);  Surgeon: Ariela Castro MD;  Location: [facility] ENDO;  Service: Endoscopy;  Laterality: N/A;    LEFT HEART CATHETERIZATION Left 9/10/2024    Procedure: Left heart cath;  Surgeon: Jemal Drummond MD;  Location: [facility] CATH LAB;  Service: Cardiology;  Laterality: Left;    SHOULDER SURGERY Right        Review of patient's allergies  "indicates:   Allergen Reactions    Dapagliflozin      Other reaction(s): Other (See Comments)    Pcn [penicillins]     Linagliptin Other (See Comments)     "it knocked me down", "it almost killed me"    Lisinopril Other (See Comments)     cough    Pantoprazole Hives       Family History       Problem Relation (Age of Onset)    COPD Brother    Cancer Mother    Colon cancer Sister    Heart disease Sister          Tobacco Use    Smoking status: Never     Passive exposure: Never    Smokeless tobacco: Never   Substance and Sexual Activity    Alcohol use: No    Drug use: Never    Sexual activity: Not Currently         Review of Systems   Constitutional:  Positive for activity change and fatigue. Negative for chills and fever.   Eyes:  Positive for visual disturbance.   Respiratory:  Positive for cough, chest tightness and shortness of breath. Negative for apnea, wheezing and stridor.    Cardiovascular:  Negative for leg swelling.   Gastrointestinal:  Positive for abdominal pain. Negative for constipation, diarrhea and nausea.     Objective:     Vital Signs (Most Recent):  Temp: 97.7 °F (36.5 °C) (03/30/25 1340)  Pulse: 90 (03/30/25 1450)  Resp: 20 (03/30/25 1340)  BP: 129/80 (03/30/25 1340)  SpO2: 98 % (03/30/25 1340) Vital Signs (24h Range):  Temp:  [97.3 °F (36.3 °C)-98.4 °F (36.9 °C)] 97.7 °F (36.5 °C)  Pulse:  [] 90  Resp:  [15-32] 20  SpO2:  [96 %-100 %] 98 %  BP: (104-167)/() 129/80     Weight: 59 kg (130 lb)  Body mass index is 19.77 kg/m².      Intake/Output Summary (Last 24 hours) at 3/30/2025 1515  Last data filed at 3/30/2025 1003  Gross per 24 hour   Intake 220 ml   Output 1700 ml   Net -1480 ml        Physical Exam  Vitals and nursing note reviewed.   Constitutional:       Appearance: He is ill-appearing (chronically).      Comments: Thin   HENT:      Head: Normocephalic.      Mouth/Throat:      Pharynx: Oropharynx is clear.   Cardiovascular:      Rate and Rhythm: Normal rate and regular rhythm. "   Pulmonary:      Breath sounds: Rales present.      Comments: On home 3L NC *home dose; dyspnea with conservation  Abdominal:      General: There is no distension.      Palpations: Abdomen is soft.      Tenderness: There is no abdominal tenderness.   Musculoskeletal:      Right lower leg: No edema.      Left lower leg: No edema.   Skin:     General: Skin is warm and dry.   Neurological:      General: No focal deficit present.      Mental Status: Mental status is at baseline.   Psychiatric:         Mood and Affect: Mood is anxious.          Vents:       Lines/Drains/Airways       Peripheral Intravenous Line  Duration                  Peripheral IV - Single Lumen 03/29/25 1632 20 G Anterior;Left Forearm <1 day                    Significant Labs:    CBC/Anemia Profile:  Recent Labs   Lab 03/29/25  1631 03/30/25  0535   WBC 10.57 9.63   HGB 13.8* 13.3*   HCT 39.8* 39.5*    311   MCV 81* 82   RDW 14.4 14.5        Chemistries:  Recent Labs   Lab 03/29/25  1631 03/30/25  0535    141   K 3.9 4.1    105   CO2 28 25   BUN 21 18   CREATININE 0.9 0.8   CALCIUM 9.2 8.8   ALBUMIN 3.3* 3.2*   BILITOT 0.4 0.5   ALKPHOS 62 61   ALT 46* 41   AST 27 22   GLUCOSE 89 135*   MG  --  1.9       All pertinent labs within the past 24 hours have been reviewed.    Significant Imaging:   I have reviewed all pertinent imaging results/findings within the past 24 hours.

## 2025-03-30 NOTE — PLAN OF CARE
Problem: Adult Inpatient Plan of Care  Goal: Plan of Care Review  Outcome: Progressing  Goal: Patient-Specific Goal (Individualized)  Outcome: Progressing  Goal: Absence of Hospital-Acquired Illness or Injury  Outcome: Progressing  Goal: Optimal Comfort and Wellbeing  Outcome: Progressing  Goal: Readiness for Transition of Care  Outcome: Progressing     Problem: Diabetes Comorbidity  Goal: Blood Glucose Level Within Targeted Range  Outcome: Not Progressing

## 2025-03-30 NOTE — ASSESSMENT & PLAN NOTE
Continue MMF, Ofev.  He is currently on a prednisone taper which has been continued  On Bactrim prophylaxis.  Ordered daily; it needs to be confirmed whether this should be daily or 3 times weekly.  Patient instructed as family to bring in Ofev as not on formulary

## 2025-03-30 NOTE — HOSPITAL COURSE
75 yo male with history of pulmonary fibrosis on Nintedanib and mmf and chronic hypoxic respiratory failure on home o2 at 3 LPM. Pulmonary was consulted for history of pulmonary fibrosis. Reports low - suspicion of suspect acute exacerbation of ILD at this time. CTA chest performed and negative for pulmonary embolism, but reveals known fibrotic changes. Recommendations to continue Mycophenolate, bactrim ppx, and steroid wean at this time.     A Palliative care consult was placed given several recent hospitalizations for similar symptoms within in the past year - 3/23-3/25; 3/16-3/27; 1/16-/120; 12/12-12/16.

## 2025-03-30 NOTE — ASSESSMENT & PLAN NOTE
Likely not coronary in etiology given recent stress test which demonstrated a fixed defect only.

## 2025-03-30 NOTE — ASSESSMENT & PLAN NOTE
Symptoms likely attributed to progressively worsening ILD.   Continue MMF, Ofev.  He is currently on a prednisone taper which has been continued  On Bactrim prophylaxis.  Ordered daily; it needs to be confirmed whether this should be daily or 3 times weekly.  Patient instructed as family to bring in Ofev as not on formulary  Plan to consult Pulmonology for further recommendations on medical management.    Palliative care consult on Monday to discuss progressively worsening disease and goals of care.

## 2025-03-30 NOTE — ASSESSMENT & PLAN NOTE
Patient recently had an echocardiogram and a stress test with the latter only demonstrating a fixed defect.  Repeat echocardiogram not ordered.      Patient has Systolic (HFrEF) heart failure that is Acute on chronic. On presentation their CHF was decompensated. Evidence of decompensated CHF on presentation includes: edema, crackles on lung auscultation, and orthopnea. The etiology of their decompensation is likely due to unclear reason. Most recent BNP and echo results are listed below.  Recent Labs     03/27/25 2025 03/29/25  1631   * 319*     Latest ECHO  Results for orders placed during the hospital encounter of 03/16/25    Echo    Interpretation Summary    Left Ventricle: The left ventricle is normal in size. There is mildly reduced systolic function with a visually estimated ejection fraction of 40 - 45%.    Right Ventricle: The right ventricle is normal in size. Systolic function is normal.    Aortic Valve: There is mild aortic valve sclerosis.    Mitral Valve: There is moderate mitral annular calcification.    Aorta: Aortic root is mildly dilated measuring 3.73 cm.    Pulmonary Artery: The estimated pulmonary artery systolic pressure is 30 mmHg.    IVC/SVC: Normal venous pressure at 3 mmHg.    Current Heart Failure Medications  metoprolol succinate (TOPROL-XL) 24 hr tablet 50 mg, Daily, Oral  sacubitriL-valsartan 24-26 mg per tablet 1 tablet, 2 times daily, Oral    Plan  - Monitor strict I&Os and daily weights.    - Place on telemetry  - Low sodium diet  - Place on fluid restriction of 2 L.   - Cardiology has not been consulted  - The patient's volume status is at their baseline. No lower extremity edema or rales noted on exam.

## 2025-03-30 NOTE — NURSING TRANSFER
Nursing Transfer Note      3/30/2025   1:43 PM    Nurse giving handoff: no report per protocol    Reason patient is being transferred: idiopathic pulmonary fibrosis    Transfer From: ED    Transfer via wheelchair    Transfer with  O2; cardiac monitor    Transported by transport staff    Transfer Vital Signs:  Blood Pressure:129/80  Heart Rate: 96  O2: 98  Temperature: 97.7  Respirations: 20    Telemetry: Box Number [id number]  Order for Tele Monitor? Yes    Additional Lines: Oxygen    Medicines sent: No    Any special needs or follow-up needed: Palliative Care consult; Pulmonology consult    Patient belongings transferred with patient: Yes    Chart send with patient: No    Upon arrival to floor: cardiac monitor applied, patient oriented to room, call bell in reach, and bed in lowest position

## 2025-03-30 NOTE — H&P
[facility] Medicine  History & Physical    Patient Name: Emmanuel Grant  MRN: [id number]  Patient Class: OP- Observation  Admission Date: 3/29/2025  Attending Physician: Pepe Newman MD   Primary Care Provider: Wilfredo De Souza MD         Patient information was obtained from patient and ER records.     Subjective:     Principal Problem:Acute on chronic systolic (congestive) heart failure    Chief Complaint:   Chief Complaint   Patient presents with    Chest Pain     Pt to ER with reports of chest pain, SOB, cough, congestion x 1 day. Pt on 2L NC sating 96% with labored breathing. Pt rating C/P 7/10.         HPI: 74-year-old male with history of hypertension, diabetes, cardiomyopathy and pulmonary fibrosis requiring 3 L by nasal cannula at baseline on Ofev, MMF and bactrim prophylaxis who presents with intermittent chest pressure over the past weeks for which a stress test occurred and demonstrated a fixed defect who presents with 2 days of worsening shortness of breath in addition to his newly developing abdominal discomfort.    As stated, for the past weeks the patient has been having intermittent chest discomfort.  Over the past days however he has had shortness of breath which seems to have worsened with exertion.  No fevers.  No new or worsening lower extremity edema.  No new productive cough.    Regarding his abdominal discomfort, it is diffuse.  He has had reduced oral intake but has continued to have bowel movements.  At the time of my interview it has essentially resolved and he is asking for food.        Past Medical History:   Diagnosis Date    Acute hypoxic respiratory failure 09/23/2024    Oxygen therapy 24/7 now, continue 2 L oxygen therapy.      BPH (benign prostatic hyperplasia) 02/28/2024    CAD (coronary artery disease)     Sees [facility], h/o stent    Chronic HFrEF (heart failure with reduced ejection fraction) 05/01/2024    Diabetes mellitus     Hypertension     Kidney  "stone     Stroke     age 62    Type 2 diabetes mellitus without complication, without long-term current use of insulin 10/08/2021       Past Surgical History:   Procedure Laterality Date    24 HOUR IMPEDANCE PH MONITORING OF ESOPHAGUS IN PATIENT NOT TAKING ACID REDUCING MEDICATIONS N/A 11/13/2024    Procedure: IMPEDANCE PH STUDY, ESOPHAGEAL, 24 HOUR, IN PATIENT NOT TAKING ACID REDUCING MEDICATION;  Surgeon: Stephany Mendoza MD;  Location: [facility] (4TH FLR);  Service: Endoscopy;  Laterality: N/A;  referral dr miguel/ off ppi / prep inst sent to pt via mail  11/6- precall attempted no answer. Unable to [facility]-  11/8 - pt not called, per chart review patient currently inpatient at [place] [id number]    CARDIAC CATHETERIZATION      with stent    COLONOSCOPY N/A 03/27/2024    Procedure: COLONOSCOPY;  Surgeon: Ariela Castro MD;  Location: [facility];  Service: Endoscopy;  Laterality: N/A;    ESOPHAGEAL MANOMETRY WITH MEASUREMENT OF IMPEDANCE N/A 11/13/2024    Procedure: MANOMETRY, ESOPHAGUS, WITH IMPEDANCE MEASUREMENT;  Surgeon: Stephany Mendoza MD;  [facility]  Service: Endoscopy;  Laterality: N/A;    ESOPHAGOGASTRODUODENOSCOPY N/A 03/27/2024    Procedure: EGD (ESOPHAGOGASTRODUODENOSCOPY);  Surgeon: Ariela Castro MD;  Location: [facility] ENDO;  Service: Endoscopy;  Laterality: N/A;    LEFT HEART CATHETERIZATION Left 9/10/2024    Procedure: Left heart cath;  Surgeon: Jemal Drummond MD;  Location: [facility] CATH LAB;  Service: Cardiology;  Laterality: Left;    SHOULDER SURGERY Right        Review of patient's allergies indicates:   Allergen Reactions    Dapagliflozin      Other reaction(s): Other (See Comments)    Pcn [penicillins]     Linagliptin Other (See Comments)     "it knocked me down", "it almost killed me"    Lisinopril Other (See Comments)     cough    Pantoprazole Hives       Current Facility-Administered Medications on File Prior to Encounter   Medication    [COMPLETED] " aluminum-magnesium hydroxide-simethicone 200-200-20 mg/5 mL suspension 30 mL    [COMPLETED] LIDOcaine viscous HCl 2% oral solution 10 mL     Current Outpatient Medications on File Prior to Encounter   Medication Sig    albuterol-ipratropium (DUO-NEB) 2.5 mg-0.5 mg/3 mL nebulizer solution Take 3 mLs by nebulization every 4 (four) hours. Rescue    amLODIPine (NORVASC) 10 MG tablet Take 1 tablet (10 mg total) by mouth once daily.    arformoteroL (BROVANA) 15 mcg/2 mL Nebu Take 2 mLs (15 mcg total) by nebulization 2 (two) times daily. Controller    aspirin 81 MG Chew Take 81 mg by mouth once daily.    benzonatate (TESSALON) 200 MG capsule Take 1 capsule (200 mg total) by mouth 3 (three) times daily as needed.    blood-glucose sensor (FREESTYLE RAMBO 3 PLUS SENSOR) Blanche Change every 15 days    budesonide (PULMICORT) 0.5 mg/2 mL nebulizer solution Take 4 mLs (1 mg total) by nebulization 2 (two) times daily. Controller    cholecalciferol, vitamin D3, (VITAMIN D3) 50 mcg (2,000 unit) Cap capsule Take 1 capsule by mouth once daily.    dextromethorphan-guaiFENesin  mg/5 ml (ROBITUSSIN-DM)  mg/5 mL liquid Take 10 mLs by mouth every 4 (four) hours as needed (cough).    famotidine (PEPCID) 20 MG tablet Take 1 tablet (20 mg total) by mouth 2 (two) times daily as needed for Heartburn.    furosemide (LASIX) 20 MG tablet Take 1 tablet (20 mg total) by mouth once daily.    glimepiride (AMARYL) 4 MG tablet Take 1 tablet (4 mg total) by mouth before breakfast.    guaiFENesin (MUCINEX) 600 mg 12 hr tablet Take 2 tablets (1,200 mg total) by mouth 2 (two) times daily. for 10 days    insulin aspart U-100 (NOVOLOG FLEXPEN U-100 INSULIN) 100 unit/mL (3 mL) InPn pen Inject as needed before meals: 180-230=+1, 231-280=+2, 281-330=+3, 331-380=+4, over 380=+5 units    latanoprost, PF, 0.005 % Drop 1 drop into affected eye in the evening Ophthalmic Once a day    metFORMIN (GLUCOPHAGE-XR) 500 MG ER 24hr tablet Take 1 tablet (500 mg  "total) by mouth 2 (two) times daily with meals.    metoprolol succinate (TOPROL-XL) 50 MG 24 hr tablet Take 1 tablet (50 mg total) by mouth once daily.    mirabegron (MYRBETRIQ) 25 mg Tb24 ER tablet Take 1 tablet (25 mg total) by mouth once daily.    mycophenolate (CELLCEPT) 250 mg Cap Take 4 capsules (1,000 mg total) by mouth 2 (two) times daily.    nintedanib (OFEV) 150 mg Cap Take 1 capsule (150 mg total) by mouth 2 (two) times a day.    nitroGLYCERIN (NITROSTAT) 0.3 MG SL tablet Place 1 tablet (0.3 mg total) under the tongue every 5 (five) minutes as needed for Chest pain.    omeprazole (PRILOSEC) 40 MG capsule Take 1 capsule (40 mg total) by mouth once daily.    ONETOUCH DELICA PLUS LANCET 33 gauge Misc Apply topically 3 (three) times daily.    pen needle, diabetic 32 gauge x 5/32" Ndle 1 each by Misc.(Non-Drug; Combo Route) route once daily.    predniSONE (DELTASONE) 10 MG tablet Take 4 tablets (40 mg total) by mouth once daily for 5 days, THEN 2 tablets (20 mg total) once daily for 5 days, THEN 1 tablet (10 mg total) once daily for 5 days, THEN 0.5 tablets (5 mg total) once daily for 5 days. Take 40 mg for 5 days, 20 mg for 5 days and 10 mg for 5 day.    rosuvastatin (CRESTOR) 20 MG tablet Take 1 tablet (20 mg total) by mouth once daily.    sacubitriL-valsartan (ENTRESTO) 24-26 mg per tablet Take 1 tablet by mouth 2 (two) times daily.    simethicone (MYLICON) 80 MG chewable tablet Take 1 tablet (80 mg total) by mouth 3 (three) times daily as needed for Flatulence.    sucralfate (CARAFATE) 1 gram tablet Take 1 tablet (1 g total) by mouth 4 (four) times daily before meals and nightly.    sulfamethoxazole-trimethoprim 800-160mg (BACTRIM DS) 800-160 mg Tab Take 1 tablet by mouth once daily.     Family History       Problem Relation (Age of Onset)    COPD Brother    Cancer Mother    Colon cancer Sister    Heart disease Sister          Tobacco Use    Smoking status: Never     Passive exposure: Never    Smokeless " "tobacco: Never   Substance and Sexual Activity    Alcohol use: No    Drug use: Never    Sexual activity: Not Currently     Review of Systems   Constitutional:  Positive for activity change and appetite change. Negative for fever.   Respiratory:  Positive for chest tightness and shortness of breath. Negative for cough and wheezing.    Cardiovascular:  Positive for chest pain. Negative for palpitations and leg swelling.   Gastrointestinal:  Positive for abdominal pain and nausea. Negative for abdominal distention.   Neurological:  Negative for seizures, syncope, speech difficulty, weakness, light-headedness and headaches.     Objective:     Vital Signs (Most Recent):  Temp: 97.4 °F (36.3 °C) (03/29/25 1507)  Pulse: 88 (03/29/25 2000)  Resp: (!) 30 (03/29/25 1732)  BP: 117/72 (03/29/25 2000)  SpO2: 97 % (03/29/25 2000) Vital Signs (24h Range):  Temp:  [97.4 °F (36.3 °C)-97.7 °F (36.5 °C)] 97.4 °F (36.3 °C)  Pulse:  [] 88  Resp:  [19-30] 30  SpO2:  [96 %-100 %] 97 %  BP: (117-167)/() 117/72     Weight: 59 kg (130 lb)  Body mass index is 19.77 kg/m².     Physical Exam         NAD, A/O 3   RRR   Crackles at bases   Soft nontender nondistended, bowel sounds present   No edema     Significant Labs: All pertinent labs within the past 24 hours have been reviewed.  CBC:   Recent Labs   Lab 03/29/25  1631   WBC 10.57   HGB 13.8*   HCT 39.8*        CMP:   Recent Labs   Lab 03/29/25  1631      K 3.9      CO2 28   BUN 21   CREATININE 0.9   CALCIUM 9.2   ALBUMIN 3.3*   BILITOT 0.4   ALKPHOS 62   AST 27   ALT 46*   ANIONGAP 11     Magnesium: No results for input(s): "MG" in the last 48 hours.    Significant Imaging: I have reviewed all pertinent imaging results/findings within the past 24 hours.  Assessment/Plan:     Assessment & Plan  Acute on chronic systolic (congestive) heart failure  This is likely the cause of the patient's shortness of breath of late.  Follow with diuresis.    Of note the " patient recently had an echocardiogram and a stress test with the latter only demonstrating a fixed defect.  Repeat echocardiogram not ordered.  Consider such if a.m. troponin is positive.        Patient has Systolic (HFrEF) heart failure that is Acute on chronic. On presentation their CHF was decompensated. Evidence of decompensated CHF on presentation includes: edema, crackles on lung auscultation, and orthopnea. The etiology of their decompensation is likely due to unclear reason . Most recent BNP and echo results are listed below.  Recent Labs     03/27/25 2025 03/29/25  1631   * 319*     Latest ECHO  Results for orders placed during the hospital encounter of 03/16/25    Echo    Interpretation Summary    Left Ventricle: The left ventricle is normal in size. There is mildly reduced systolic function with a visually estimated ejection fraction of 40 - 45%.    Right Ventricle: The right ventricle is normal in size. Systolic function is normal.    Aortic Valve: There is mild aortic valve sclerosis.    Mitral Valve: There is moderate mitral annular calcification.    Aorta: Aortic root is mildly dilated measuring 3.73 cm.    Pulmonary Artery: The estimated pulmonary artery systolic pressure is 30 mmHg.    IVC/SVC: Normal venous pressure at 3 mmHg.    Current Heart Failure Medications  furosemide injection 40 mg, Once, Intravenous  metoprolol succinate (TOPROL-XL) 24 hr tablet 50 mg, Daily, Oral  sacubitriL-valsartan 24-26 mg per tablet 1 tablet, 2 times daily, Oral  furosemide injection 40 mg, 2 times daily before meals, Intravenous    Plan  - Monitor strict I&Os and daily weights.    - Place on telemetry  - Low sodium diet  - Place on fluid restriction of 2 L.   - Cardiology has not been consulted  - The patient's volume status is stable but not at their baseline as indicated by edema, crackles on lung auscultation, and orthopnea  -      IPF (idiopathic pulmonary fibrosis)    Continue MMF, Ofev.  He is  currently on a prednisone taper which has been continued  On Bactrim prophylaxis.  Ordered daily; it needs to be confirmed whether this should be daily or 3 times weekly.  Patient instructed as family to bring in Ofev as not on formulary  Type 2 diabetes mellitus without complication, without long-term current use of insulin    Insulin sliding scale  Coronary artery disease involving native coronary artery of native heart with angina pectoris  Continue home regimen  GERD (gastroesophageal reflux disease)    Continue PPI  Essential hypertension  Patient's blood pressure range in the last 24 hours was: BP  Min: 117/72  Max: 167/100.The patient's inpatient anti-hypertensive regimen is listed below:  Current Antihypertensives  furosemide injection 40 mg, Once, Intravenous  amLODIPine tablet 10 mg, Daily, Oral  metoprolol succinate (TOPROL-XL) 24 hr tablet 50 mg, Daily, Oral  nitroGLYCERIN SL tablet 0.4 mg, Every 5 min PRN, Sublingual  furosemide injection 40 mg, 2 times daily before meals, Intravenous    Plan  - BP is controlled, no changes needed to their regimen  -   Chest pain    Likely not coronary in etiology given recent stress test which demonstrated a fixed defect only.  Immunosuppression due to drug therapy    On daily Bactrim.  It should be confirmed whether or this should be daily or 3 times weekly dosing.  Chronic hypoxemic respiratory failure  Patient with Hypoxic Respiratory failure which is Chronic.  he is on home oxygen at 3 LPM. Supplemental oxygen was provided and noted-      .     VTE Risk Mitigation (From admission, onward)           Ordered     enoxaparin injection 40 mg  Daily         03/29/25 2039     Place sequential compression device  Until discontinued         03/29/25 2039     IP VTE HIGH RISK PATIENT  Once         03/29/25 2039     Place sequential compression device  Until discontinued         03/29/25 2039                       On 03/29/2025, patient should be placed in hospital  observation services under my care.             Martin Trevino MD  Department of Hospital Medicine  [facility] [place] - Emergency Dept

## 2025-03-30 NOTE — ASSESSMENT & PLAN NOTE
On daily Bactrim.  It should be confirmed whether or this should be daily or 3 times weekly dosing.

## 2025-03-30 NOTE — ED NOTES
"I reviewed the patient's chart and discussed the case with the patient's team.      I examined Washington Glasgow at bedside.  The patient lacks capacity for complex medical decision-making.  I spoke with his mother at bedside.    I introduced myself and my role as palliative care physician. They were agreeable to speaking.    We discussed the patient's medical illness, prognosis, and values in detail.  Below is a brief summary of our discussion.    She understands that he was admitted being treated for liver failure, kidney failure, and GI bleed.  She was not entirely up-to-date on his overall medical condition.  I did take a moment to educate her.    We discussed his prognosis.  She understands my worry if he continues on his current trajectory, in the best case scenario his prognosis is thought of in terms of weeks to months.  She was surprised by this news.  She was hopeful for better.      We discussed his values.  He is living for "a lot". He is fighting for his family.  She believes he was willing to pursue any and all available measures in order to live.  She believes he would be willing to quit drinking.  He fears dying.    Today, I simply took a moment to build her poor.  It was clear his mother did not desire any changes in his current goals of care.    I did let her know we will continue to keep her and her family up-to-date in his condition.  Whether he was experiences changes for the better or for the worst.    If it becomes apparent that he was dying, we will let her know and make recommendations of comfort care.  She was hopeful these recommendations will not come.  However, she seems to understand gravity of his current situation.    We will follow up on Monday.  " Bedside shift report given to KANDI Gonzalez resuming care

## 2025-03-30 NOTE — SUBJECTIVE & OBJECTIVE
Interval History: Euvolemic appearing on exam. Satting % on home 3L NC. Complaining that he is very uncomfortable, though having difficulty expressing what's bothering him.    Review of Systems   Constitutional:  Positive for activity change. Negative for appetite change, chills, diaphoresis and fever.   Respiratory:  Positive for chest tightness and shortness of breath.    Cardiovascular:  Negative for chest pain, palpitations and leg swelling.   Gastrointestinal:  Positive for abdominal pain. Negative for constipation, diarrhea, nausea and vomiting.   Genitourinary:  Negative for dysuria, frequency and urgency.   Neurological:  Negative for dizziness, syncope, weakness and light-headedness.     Objective:     Vital Signs (Most Recent):  Temp: 97.5 °F (36.4 °C) (03/30/25 0405)  Pulse: 98 (03/30/25 0735)  Resp: 18 (03/30/25 0735)  BP: 120/67 (03/30/25 0602)  SpO2: 99 % (03/30/25 0735) Vital Signs (24h Range):  Temp:  [97.4 °F (36.3 °C)-98.4 °F (36.9 °C)] 97.5 °F (36.4 °C)  Pulse:  [] 98  Resp:  [15-32] 18  SpO2:  [96 %-100 %] 99 %  BP: (117-167)/() 120/67     Weight: 59 kg (130 lb)  Body mass index is 19.77 kg/m².    Intake/Output Summary (Last 24 hours) at 3/30/2025 0869  Last data filed at 3/30/2025 0510  Gross per 24 hour   Intake --   Output 1700 ml   Net -1700 ml         Physical Exam  Vitals and nursing note reviewed.   Constitutional:       Appearance: He is ill-appearing (chronically).   HENT:      Head: Normocephalic.      Mouth/Throat:      Pharynx: Oropharynx is clear.   Cardiovascular:      Rate and Rhythm: Normal rate and regular rhythm.   Pulmonary:      Effort: No respiratory distress.      Breath sounds: No wheezing or rales.      Comments: On home 3L NC. Coarse breath sounds noted bilaterally.  Abdominal:      General: There is no distension.      Palpations: Abdomen is soft.      Tenderness: There is no abdominal tenderness.   Musculoskeletal:      Right lower leg: No edema.       Left lower leg: No edema.   Skin:     General: Skin is warm and dry.   Neurological:      General: No focal deficit present.      Mental Status: Mental status is at baseline.   Psychiatric:         Mood and Affect: Mood is anxious.               Significant Labs: All pertinent labs within the past 24 hours have been reviewed.    Significant Imaging: I have reviewed all pertinent imaging results/findings within the past 24 hours.

## 2025-03-30 NOTE — ASSESSMENT & PLAN NOTE
Patient with chronic interstitial lung disease. Not associated with rheumatological disease. Suspected to be idiopathic. Patient has been chronically dyspneic with frequent hospitalizations for this. He is on Cellcept 1 gm twice a day and was recently started a month ago on anti fibrotic therapy with Nintedanib 150 mg twice a day. He is on steroids and has been instructed to wean. At current, he is at 40 mg. He presents this time with complaint of abdominal and chest pain. He reports dyspnea is chronic but unchanged.  I do not suspect acute exacerbation of ILD at this time. A CTA chest performed this admission is negative for pulmonary embolism, but reveals known fibrotic changes  - Recommend to continue with steroid wean - no role to increase dose currently  - continue Mycophenolate  - continue bactrim ppx  - if patient or family can bring Ofev, would resume at current home dose  - reviewing his chart and prior notes from pulmonologist, we discussed that his disease is advanced and he has shown signs of worsening of disease  - I recommend discussion with palliative team as he is significantly deconditioned from current illness and has had multiple hospitalizations. He is agreeable for this discussion. He was referred to lung transplant clinic by Dr. Schmitt as well, but he has not seen them yet. He is at an advanced age and has comorbid coronary artery disease.  - patient gave permission to speak with one of his sons, Emmanuel Grant Jr. Who is a surgeon. I reached out to him, but was not able to get in touch  - would benefit from pulmonary rehab as outpatient

## 2025-03-30 NOTE — ASSESSMENT & PLAN NOTE
Troponin trend negative. EKG without acute changes.   Recent stress test with severe intensity, fixed perfusion abnormality consistent with scar in the anteroapical wall.

## 2025-03-30 NOTE — ASSESSMENT & PLAN NOTE
- non-specific on exam  - he has been experiencing this for a few weeks  - this could be in setting of Ofev  - continue PPI  - recommend trial or maalox  - check lipase (added it on to earlier lab)  - no diarrhea reported

## 2025-03-30 NOTE — ASSESSMENT & PLAN NOTE
Patient's blood pressure range in the last 24 hours was: BP  Min: 104/70  Max: 167/100.The patient's inpatient anti-hypertensive regimen is listed below:  Current Antihypertensives  amLODIPine tablet 10 mg, Daily, Oral  metoprolol succinate (TOPROL-XL) 24 hr tablet 50 mg, Daily, Oral  nitroGLYCERIN SL tablet 0.4 mg, Every 5 min PRN, Sublingual    Plan  - BP is controlled, no changes needed to their regimen  -

## 2025-03-30 NOTE — CONSULTS
[facility]  Pulmonology  Consult Note    Patient Name: Emmanuel Grant  MRN: [id number]  Admission Date: 3/29/2025  Hospital Length of Stay: 0 days  Code Status: Full Code  Attending Physician: Pepe Newman MD  Primary Care Provider: Wilfredo De Souza MD   Principal Problem: IPF (idiopathic pulmonary fibrosis)    Inpatient consult to Pulmonology  Consult performed by: J Carlos Barriga MD  Consult ordered by: Umang Miller PA-C        Subjective:     HPI:  73 yo male with history of pulmonary fibrosis on Nintedanib and mmf and chronic hypoxic respiratory failure on home o2 at 3 LPM. He presents to [facility] to intermittent chest pressure over the past week associated with abdominal pain. He reports diffuse abdominal pain that comes and goes. He denies any nausea or diarrhea.  He has had reduced oral intake but has continued to have bowel movements.    Pulmonary was consulted for history of pulmonary fibrosis. He has had several admissions for shortness of breath in the past year - Hospitalization 3/23-3/25; 3/16-3/27; 1/16-/120; 12/12-12/16. He follows with Dr. Schmitt and Dr. Waldrop. He was started on Ofev on Feb 21 during his outpatient visit. He has also been on MMF 1000 mg twice a day. He has been on steroids with intermittent taper. Currently, he is on prednisone 40 mg.    He reports dyspnea is chronic and close to baseline. He does however report midsternal pressure like pain and believes it starts in abdominal area. This pain comes and goes. He is using 3 LPM of nasal cannula. His prior POC had malfunctioned and he is currently using portable tanks at 3 LPM. He reports compliances with his medications.    He lives by himself, has family close by. He has a son who is a surgeon currently living out of state.          Past Medical History:   Diagnosis Date    Acute hypoxic respiratory failure 09/23/2024    Oxygen therapy 24/7 now, continue 2 L oxygen therapy.      BPH (benign prostatic hyperplasia)  "02/28/2024    CAD (coronary artery disease)     Sees [facility] [place], h/o stent    Chronic HFrEF (heart failure with reduced ejection fraction) 05/01/2024    Diabetes mellitus     Hypertension     Kidney stone     Stroke     age 62    Type 2 diabetes mellitus without complication, without long-term current use of insulin 10/08/2021       Past Surgical History:   Procedure Laterality Date    24 HOUR IMPEDANCE PH MONITORING OF ESOPHAGUS IN PATIENT NOT TAKING ACID REDUCING MEDICATIONS N/A 11/13/2024    Procedure: IMPEDANCE PH STUDY, ESOPHAGEAL, 24 HOUR, IN PATIENT NOT TAKING ACID REDUCING MEDICATION;  Surgeon: Stephany Mendoza MD;  Location: [facility] (4TH FLR);  Service: Endoscopy;  Laterality: N/A;  referral dr miguel/ off ppi / prep inst sent to pt via mail  11/6- precall attempted no answer. Unable to [facility]-  11/8 - pt not called, per chart review patient currently inpatient at [place] [id number]    CARDIAC CATHETERIZATION      with stent    COLONOSCOPY N/A 03/27/2024    Procedure: COLONOSCOPY;  Surgeon: Ariela Castro MD;  Location: [facility];  Service: Endoscopy;  Laterality: N/A;    ESOPHAGEAL MANOMETRY WITH MEASUREMENT OF IMPEDANCE N/A 11/13/2024    Procedure: MANOMETRY, ESOPHAGUS, WITH IMPEDANCE MEASUREMENT;  Surgeon: Stephany Mendoza MD;  Location: [facility] (4TH FLR);  Service: Endoscopy;  Laterality: N/A;    ESOPHAGOGASTRODUODENOSCOPY N/A 03/27/2024    Procedure: EGD (ESOPHAGOGASTRODUODENOSCOPY);  Surgeon: Ariela Castro MD;  Location: [facility] ENDO;  Service: Endoscopy;  Laterality: N/A;    LEFT HEART CATHETERIZATION Left 9/10/2024    Procedure: Left heart cath;  Surgeon: Jemal Durmmond MD;  Location: [facility] CATH LAB;  Service: Cardiology;  Laterality: Left;    SHOULDER SURGERY Right        Review of patient's allergies indicates:   Allergen Reactions    Dapagliflozin      Other reaction(s): Other (See Comments)    Pcn [penicillins]     Linagliptin Other (See Comments)     "it knocked me down", "it " "almost killed me"    Lisinopril Other (See Comments)     cough    Pantoprazole Hives       Family History       Problem Relation (Age of Onset)    COPD Brother    Cancer Mother    Colon cancer Sister    Heart disease Sister          Tobacco Use    Smoking status: Never     Passive exposure: Never    Smokeless tobacco: Never   Substance and Sexual Activity    Alcohol use: No    Drug use: Never    Sexual activity: Not Currently         Review of Systems   Constitutional:  Positive for activity change and fatigue. Negative for chills and fever.   Eyes:  Positive for visual disturbance.   Respiratory:  Positive for cough, chest tightness and shortness of breath. Negative for apnea, wheezing and stridor.    Cardiovascular:  Negative for leg swelling.   Gastrointestinal:  Positive for abdominal pain. Negative for constipation, diarrhea and nausea.     Objective:     Vital Signs (Most Recent):  Temp: 97.7 °F (36.5 °C) (03/30/25 1340)  Pulse: 90 (03/30/25 1450)  Resp: 20 (03/30/25 1340)  BP: 129/80 (03/30/25 1340)  SpO2: 98 % (03/30/25 1340) Vital Signs (24h Range):  Temp:  [97.3 °F (36.3 °C)-98.4 °F (36.9 °C)] 97.7 °F (36.5 °C)  Pulse:  [] 90  Resp:  [15-32] 20  SpO2:  [96 %-100 %] 98 %  BP: (104-167)/() 129/80     Weight: 59 kg (130 lb)  Body mass index is 19.77 kg/m².      Intake/Output Summary (Last 24 hours) at 3/30/2025 1515  Last data filed at 3/30/2025 1003  Gross per 24 hour   Intake 220 ml   Output 1700 ml   Net -1480 ml        Physical Exam  Vitals and nursing note reviewed.   Constitutional:       Appearance: He is ill-appearing (chronically).      Comments: Thin   HENT:      Head: Normocephalic.      Mouth/Throat:      Pharynx: Oropharynx is clear.   Cardiovascular:      Rate and Rhythm: Normal rate and regular rhythm.   Pulmonary:      Breath sounds: Rales present.      Comments: On home 3L NC *home dose; dyspnea with conservation  Abdominal:      General: There is no distension.      Palpations: " "Abdomen is soft.      Tenderness: There is no abdominal tenderness.   Musculoskeletal:      Right lower leg: No edema.      Left lower leg: No edema.   Skin:     General: Skin is warm and dry.   Neurological:      General: No focal deficit present.      Mental Status: Mental status is at baseline.   Psychiatric:         Mood and Affect: Mood is anxious.          Vents:       Lines/Drains/Airways       Peripheral Intravenous Line  Duration                  Peripheral IV - Single Lumen 03/29/25 1632 20 G Anterior;Left Forearm <1 day                    Significant Labs:    CBC/Anemia Profile:  Recent Labs   Lab 03/29/25  1631 03/30/25  0535   WBC 10.57 9.63   HGB 13.8* 13.3*   HCT 39.8* 39.5*    311   MCV 81* 82   RDW 14.4 14.5        Chemistries:  Recent Labs   Lab 03/29/25  1631 03/30/25  0535    141   K 3.9 4.1    105   CO2 28 25   BUN 21 18   CREATININE 0.9 0.8   CALCIUM 9.2 8.8   ALBUMIN 3.3* 3.2*   BILITOT 0.4 0.5   ALKPHOS 62 61   ALT 46* 41   AST 27 22   GLUCOSE 89 135*   MG  --  1.9       All pertinent labs within the past 24 hours have been reviewed.    Significant Imaging:   I have reviewed all pertinent imaging results/findings within the past 24 hours.    ABG  No results for input(s): "PH", "PO2", "PCO2", "HCO3", "BE" in the last 168 hours.  Assessment/Plan:     Psychiatric  Anxiety  Driven by breathlessness    Pulmonary  * IPF (idiopathic pulmonary fibrosis)  Patient with chronic interstitial lung disease. Not associated with rheumatological disease. Suspected to be idiopathic. Patient has been chronically dyspneic with frequent hospitalizations for this. He is on Cellcept 1 gm twice a day and was recently started a month ago on anti fibrotic therapy with Nintedanib 150 mg twice a day. He is on steroids and has been instructed to wean. At current, he is at 40 mg. He presents this time with complaint of abdominal and chest pain. He reports dyspnea is chronic but unchanged.  I do not " suspect acute exacerbation of ILD at this time. A CTA chest performed this admission is negative for pulmonary embolism, but reveals known fibrotic changes  - Recommend to continue with steroid wean - no role to increase dose currently  - continue Mycophenolate  - continue bactrim ppx  - if patient or family can bring Ofev, would resume at current home dose  - reviewing his chart and prior notes from pulmonologist, we discussed that his disease is advanced and he has shown signs of worsening of disease  - I recommend discussion with palliative team as he is significantly deconditioned from current illness and has had multiple hospitalizations. He is agreeable for this discussion. He was referred to lung transplant clinic by Dr. Schmitt as well, but he has not seen them yet. He is at an advanced age and has comorbid coronary artery disease.  - patient gave permission to speak with one of his sons, Emmanuel Grant Jr. Who is a surgeon. I reached out to him, but was not able to get in touch  - would benefit from pulmonary rehab as outpatient    Chronic hypoxemic respiratory failure  Patient with Hypoxic Respiratory failure which is Chronic.  he is on home oxygen at 3 LPM. Supplemental oxygen was provided and noted-    No increase in O2 need  - still on 3 LPM with SpO2 in high 90's    GI  Generalized abdominal pain  - non-specific on exam  - he has been experiencing this for a few weeks  - this could be in setting of Ofev  - continue PPI  - recommend trial or maalox  - check lipase (added it on to earlier lab)  - no diarrhea reported          Thank you for your consult. I will follow-up with patient. Please contact us if you have any additional questions.     J Carlos Barriga MD  Pulmonology  [facility] [place] [facility]

## 2025-03-31 PROBLEM — I50.32 CHRONIC DIASTOLIC CHF (CONGESTIVE HEART FAILURE): Status: ACTIVE | Noted: 2025-03-31

## 2025-03-31 PROBLEM — Z71.89 ACP (ADVANCE CARE PLANNING): Status: ACTIVE | Noted: 2025-03-31

## 2025-03-31 LAB
ABSOLUTE EOSINOPHIL (OHS): 0.13 K/UL
ABSOLUTE MONOCYTE (OHS): 0.95 K/UL (ref 0.3–1)
ABSOLUTE NEUTROPHIL COUNT (OHS): 7.46 K/UL (ref 1.8–7.7)
ALBUMIN SERPL BCP-MCNC: 3.6 G/DL (ref 3.5–5.2)
ALP SERPL-CCNC: 69 UNIT/L (ref 40–150)
ALT SERPL W/O P-5'-P-CCNC: 37 UNIT/L (ref 10–44)
ANION GAP (OHS): 13 MMOL/L (ref 8–16)
AST SERPL-CCNC: 14 UNIT/L (ref 11–45)
BASOPHILS # BLD AUTO: 0.01 K/UL
BASOPHILS NFR BLD AUTO: 0.1 %
BILIRUB SERPL-MCNC: 0.4 MG/DL (ref 0.1–1)
BUN SERPL-MCNC: 28 MG/DL (ref 8–23)
CALCIUM SERPL-MCNC: 9.9 MG/DL (ref 8.7–10.5)
CHLORIDE SERPL-SCNC: 102 MMOL/L (ref 95–110)
CO2 SERPL-SCNC: 21 MMOL/L (ref 23–29)
CREAT SERPL-MCNC: 1 MG/DL (ref 0.5–1.4)
ERYTHROCYTE [DISTWIDTH] IN BLOOD BY AUTOMATED COUNT: 14.6 % (ref 11.5–14.5)
GFR SERPLBLD CREATININE-BSD FMLA CKD-EPI: >60 ML/MIN/1.73/M2
GLUCOSE SERPL-MCNC: 271 MG/DL (ref 70–110)
HCT VFR BLD AUTO: 46.1 % (ref 40–54)
HGB BLD-MCNC: 14.9 GM/DL (ref 14–18)
IMM GRANULOCYTES # BLD AUTO: 0.06 K/UL (ref 0–0.04)
IMM GRANULOCYTES NFR BLD AUTO: 0.6 % (ref 0–0.5)
LYMPHOCYTES # BLD AUTO: 1.43 K/UL (ref 1–4.8)
MAGNESIUM SERPL-MCNC: 2.3 MG/DL (ref 1.6–2.6)
MCH RBC QN AUTO: 27.5 PG (ref 27–50)
MCHC RBC AUTO-ENTMCNC: 32.3 G/DL (ref 32–36)
MCV RBC AUTO: 85 FL (ref 82–98)
NUCLEATED RBC (/100WBC) (OHS): 0 /100 WBC
OHS QRS DURATION: 94 MS
OHS QRS DURATION: 96 MS
OHS QTC CALCULATION: 449 MS
OHS QTC CALCULATION: 454 MS
PLATELET # BLD AUTO: 308 K/UL (ref 150–450)
PMV BLD AUTO: 10.6 FL (ref 9.2–12.9)
POCT GLUCOSE: 197 MG/DL (ref 70–110)
POCT GLUCOSE: 253 MG/DL (ref 70–110)
POCT GLUCOSE: 279 MG/DL (ref 70–110)
POCT GLUCOSE: 433 MG/DL (ref 70–110)
POCT GLUCOSE: 460 MG/DL (ref 70–110)
POTASSIUM SERPL-SCNC: 4.3 MMOL/L (ref 3.5–5.1)
PROT SERPL-MCNC: 7.5 GM/DL (ref 6–8.4)
RBC # BLD AUTO: 5.42 M/UL (ref 4.6–6.2)
RELATIVE EOSINOPHIL (OHS): 1.3 %
RELATIVE LYMPHOCYTE (OHS): 14.2 % (ref 18–48)
RELATIVE MONOCYTE (OHS): 9.5 % (ref 4–15)
RELATIVE NEUTROPHIL (OHS): 74.3 % (ref 38–73)
SODIUM SERPL-SCNC: 136 MMOL/L (ref 136–145)
WBC # BLD AUTO: 10.04 K/UL (ref 3.9–12.7)

## 2025-03-31 PROCEDURE — 80053 COMPREHEN METABOLIC PANEL: CPT | Mod: HCNC | Performed by: INTERNAL MEDICINE

## 2025-03-31 PROCEDURE — 94761 N-INVAS EAR/PLS OXIMETRY MLT: CPT | Mod: HCNC

## 2025-03-31 PROCEDURE — 25000003 PHARM REV CODE 250: Mod: HCNC | Performed by: HOSPITALIST

## 2025-03-31 PROCEDURE — 27000221 HC OXYGEN, UP TO 24 HOURS: Mod: HCNC

## 2025-03-31 PROCEDURE — 25000242 PHARM REV CODE 250 ALT 637 W/ HCPCS: Mod: HCNC | Performed by: INTERNAL MEDICINE

## 2025-03-31 PROCEDURE — 99205 OFFICE O/P NEW HI 60 MIN: CPT | Mod: HCNC,,, | Performed by: REGISTERED NURSE

## 2025-03-31 PROCEDURE — 85025 COMPLETE CBC W/AUTO DIFF WBC: CPT | Mod: HCNC | Performed by: INTERNAL MEDICINE

## 2025-03-31 PROCEDURE — 93010 ELECTROCARDIOGRAM REPORT: CPT | Mod: HCNC,,, | Performed by: INTERNAL MEDICINE

## 2025-03-31 PROCEDURE — 83735 ASSAY OF MAGNESIUM: CPT | Mod: HCNC | Performed by: INTERNAL MEDICINE

## 2025-03-31 PROCEDURE — G0378 HOSPITAL OBSERVATION PER HR: HCPCS | Mod: HCNC

## 2025-03-31 PROCEDURE — 63600175 PHARM REV CODE 636 W HCPCS: Mod: HCNC | Performed by: INTERNAL MEDICINE

## 2025-03-31 PROCEDURE — 93005 ELECTROCARDIOGRAM TRACING: CPT | Mod: HCNC

## 2025-03-31 PROCEDURE — 96372 THER/PROPH/DIAG INJ SC/IM: CPT | Performed by: INTERNAL MEDICINE

## 2025-03-31 PROCEDURE — 25000003 PHARM REV CODE 250: Mod: HCNC | Performed by: INTERNAL MEDICINE

## 2025-03-31 PROCEDURE — 94640 AIRWAY INHALATION TREATMENT: CPT | Mod: HCNC,XB

## 2025-03-31 PROCEDURE — 99900035 HC TECH TIME PER 15 MIN (STAT): Mod: HCNC

## 2025-03-31 PROCEDURE — 99497 ADVNCD CARE PLAN 30 MIN: CPT | Mod: HCNC,25,, | Performed by: REGISTERED NURSE

## 2025-03-31 PROCEDURE — 36415 COLL VENOUS BLD VENIPUNCTURE: CPT | Mod: HCNC | Performed by: INTERNAL MEDICINE

## 2025-03-31 RX ORDER — DEXTROMETHORPHAN POLISTIREX 30 MG/5 ML
1 SUSPENSION, EXTENDED RELEASE 12 HR ORAL ONCE
Status: COMPLETED | OUTPATIENT
Start: 2025-03-31 | End: 2025-03-31

## 2025-03-31 RX ORDER — SIMETHICONE 80 MG
1 TABLET,CHEWABLE ORAL 3 TIMES DAILY PRN
Status: DISCONTINUED | OUTPATIENT
Start: 2025-03-31 | End: 2025-04-01 | Stop reason: HOSPADM

## 2025-03-31 RX ORDER — HYDROXYZINE HYDROCHLORIDE 25 MG/1
25 TABLET, FILM COATED ORAL 3 TIMES DAILY PRN
Status: DISCONTINUED | OUTPATIENT
Start: 2025-03-31 | End: 2025-04-01 | Stop reason: HOSPADM

## 2025-03-31 RX ORDER — BUTALBITAL, ACETAMINOPHEN AND CAFFEINE 50; 325; 40 MG/1; MG/1; MG/1
1 TABLET ORAL EVERY 4 HOURS PRN
Status: DISCONTINUED | OUTPATIENT
Start: 2025-03-31 | End: 2025-04-01 | Stop reason: HOSPADM

## 2025-03-31 RX ADMIN — BUDESONIDE 1 MG: 0.5 INHALANT RESPIRATORY (INHALATION) at 08:03

## 2025-03-31 RX ADMIN — SIMETHICONE 80 MG: 80 TABLET, CHEWABLE ORAL at 02:03

## 2025-03-31 RX ADMIN — AMLODIPINE BESYLATE 10 MG: 5 TABLET ORAL at 08:03

## 2025-03-31 RX ADMIN — DIPHENHYDRAMINE HYDROCHLORIDE 25 MG: 25 CAPSULE ORAL at 05:03

## 2025-03-31 RX ADMIN — PREDNISONE 20 MG: 20 TABLET ORAL at 08:03

## 2025-03-31 RX ADMIN — MYCOPHENOLATE MOFETIL 1000 MG: 250 CAPSULE ORAL at 08:03

## 2025-03-31 RX ADMIN — INSULIN ASPART 3 UNITS: 100 INJECTION, SOLUTION INTRAVENOUS; SUBCUTANEOUS at 12:03

## 2025-03-31 RX ADMIN — INSULIN ASPART 1 UNITS: 100 INJECTION, SOLUTION INTRAVENOUS; SUBCUTANEOUS at 09:03

## 2025-03-31 RX ADMIN — MINERAL OIL 1 ENEMA: 100 ENEMA RECTAL at 04:03

## 2025-03-31 RX ADMIN — SUCRALFATE 1 G: 1 TABLET ORAL at 05:03

## 2025-03-31 RX ADMIN — ARFORMOTEROL TARTRATE 15 MCG: 15 SOLUTION RESPIRATORY (INHALATION) at 08:03

## 2025-03-31 RX ADMIN — SUCRALFATE 1 G: 1 TABLET ORAL at 04:03

## 2025-03-31 RX ADMIN — ENOXAPARIN SODIUM 40 MG: 40 INJECTION SUBCUTANEOUS at 04:03

## 2025-03-31 RX ADMIN — HYDROCODONE BITARTRATE AND ACETAMINOPHEN 1 TABLET: 5; 325 TABLET ORAL at 12:03

## 2025-03-31 RX ADMIN — FAMOTIDINE 20 MG: 20 TABLET, FILM COATED ORAL at 05:03

## 2025-03-31 RX ADMIN — OXYBUTYNIN CHLORIDE 5 MG: 5 TABLET, EXTENDED RELEASE ORAL at 08:03

## 2025-03-31 RX ADMIN — NITROGLYCERIN 0.4 MG: 0.4 TABLET, ORALLY DISINTEGRATING SUBLINGUAL at 01:03

## 2025-03-31 RX ADMIN — SENNOSIDES AND DOCUSATE SODIUM 1 TABLET: 50; 8.6 TABLET ORAL at 10:03

## 2025-03-31 RX ADMIN — SUCRALFATE 1 G: 1 TABLET ORAL at 09:03

## 2025-03-31 RX ADMIN — SULFAMETHOXAZOLE AND TRIMETHOPRIM 1 TABLET: 800; 160 TABLET ORAL at 08:03

## 2025-03-31 RX ADMIN — SUCRALFATE 1 G: 1 TABLET ORAL at 10:03

## 2025-03-31 RX ADMIN — METOPROLOL SUCCINATE 50 MG: 50 TABLET, EXTENDED RELEASE ORAL at 08:03

## 2025-03-31 RX ADMIN — ASPIRIN 81 MG CHEWABLE TABLET 81 MG: 81 TABLET CHEWABLE at 08:03

## 2025-03-31 RX ADMIN — MYCOPHENOLATE MOFETIL 1000 MG: 250 CAPSULE ORAL at 09:03

## 2025-03-31 RX ADMIN — LACTULOSE 15 G: 20 SOLUTION ORAL at 02:03

## 2025-03-31 RX ADMIN — PANTOPRAZOLE SODIUM 40 MG: 40 TABLET, DELAYED RELEASE ORAL at 08:03

## 2025-03-31 RX ADMIN — HYDROXYZINE HYDROCHLORIDE 25 MG: 25 TABLET ORAL at 01:03

## 2025-03-31 RX ADMIN — LACTULOSE 15 G: 20 SOLUTION ORAL at 09:03

## 2025-03-31 RX ADMIN — HYDROCODONE BITARTRATE AND ACETAMINOPHEN 1 TABLET: 5; 325 TABLET ORAL at 04:03

## 2025-03-31 RX ADMIN — ACETAMINOPHEN 650 MG: 325 TABLET ORAL at 11:03

## 2025-03-31 RX ADMIN — SACUBITRIL AND VALSARTAN 1 TABLET: 24; 26 TABLET, FILM COATED ORAL at 08:03

## 2025-03-31 RX ADMIN — INSULIN ASPART 5 UNITS: 100 INJECTION, SOLUTION INTRAVENOUS; SUBCUTANEOUS at 06:03

## 2025-03-31 NOTE — SUBJECTIVE & OBJECTIVE
Past Medical History:   Diagnosis Date    Acute hypoxic respiratory failure 09/23/2024    Oxygen therapy 24/7 now, continue 2 L oxygen therapy.      BPH (benign prostatic hyperplasia) 02/28/2024    CAD (coronary artery disease)     Sees [facility], h/o stent    Chronic HFrEF (heart failure with reduced ejection fraction) 05/01/2024    Diabetes mellitus     Hypertension     Kidney stone     Stroke     age 62    Type 2 diabetes mellitus without complication, without long-term current use of insulin 10/08/2021       Past Surgical History:   Procedure Laterality Date    24 HOUR IMPEDANCE PH MONITORING OF ESOPHAGUS IN PATIENT NOT TAKING ACID REDUCING MEDICATIONS N/A 11/13/2024    Procedure: IMPEDANCE PH STUDY, ESOPHAGEAL, 24 HOUR, IN PATIENT NOT TAKING ACID REDUCING MEDICATION;  Surgeon: Stephany Mendoza MD;  Location: [facility] (4TH FLR);  Service: Endoscopy;  Laterality: N/A;  referral dr miguel/ off ppi / prep inst sent to pt via mail  11/6- precall attempted no answer. Unable to [facility]-  11/8 - pt not called, per chart review patient currently inpatient at [place] [id number]    CARDIAC CATHETERIZATION      with stent    COLONOSCOPY N/A 03/27/2024    Procedure: COLONOSCOPY;  Surgeon: Ariela Castro MD;  Location: [facility];  Service: Endoscopy;  Laterality: N/A;    ESOPHAGEAL MANOMETRY WITH MEASUREMENT OF IMPEDANCE N/A 11/13/2024    Procedure: MANOMETRY, ESOPHAGUS, WITH IMPEDANCE MEASUREMENT;  Surgeon: Stephany Mendoza MD;  Location: [facility] (4TH FLR);  Service: Endoscopy;  Laterality: N/A;    ESOPHAGOGASTRODUODENOSCOPY N/A 03/27/2024    Procedure: EGD (ESOPHAGOGASTRODUODENOSCOPY);  Surgeon: Ariela Castro MD;  Location: [facility] ENDO;  Service: Endoscopy;  Laterality: N/A;    LEFT HEART CATHETERIZATION Left 9/10/2024    Procedure: Left heart cath;  Surgeon: Jemal Drummond MD;  Location: [facility] CATH LAB;  Service: Cardiology;  Laterality: Left;    SHOULDER SURGERY Right        Review of patient's allergies  "indicates:   Allergen Reactions    Dapagliflozin      Other reaction(s): Other (See Comments)    Pcn [penicillins]     Linagliptin Other (See Comments)     "it knocked me down", "it almost killed me"    Lisinopril Other (See Comments)     cough    Pantoprazole Hives       Medications:  Continuous Infusions:  Scheduled Meds:   amLODIPine  10 mg Oral Daily    arformoteroL  15 mcg Nebulization BID    aspirin  81 mg Oral Daily    budesonide  1 mg Nebulization BID    enoxparin  40 mg Subcutaneous Daily    lactulose  15 g Oral TID    latanoprost  1 drop Both Eyes QHS    metoprolol succinate  50 mg Oral Daily    mineral oil  1 enema Rectal Once    mycophenolate  1,000 mg Oral BID    oxybutynin  5 mg Oral Daily    pantoprazole  40 mg Oral Daily    predniSONE  20 mg Oral Daily    Followed by    [START ON 4/5/2025] predniSONE  10 mg Oral Daily    Followed by    [START ON 4/10/2025] predniSONE  5 mg Oral Daily    sacubitriL-valsartan  1 tablet Oral BID    sucralfate  1 g Oral QID (AC & HS)    sulfamethoxazole-trimethoprim 800-160mg  1 tablet Oral Daily     PRN Meds:  Current Facility-Administered Medications:     butalbital-acetaminophen-caffeine -40 mg, 1 tablet, Oral, Q4H PRN    dextromethorphan-guaiFENesin  mg/5 ml, 10 mL, Oral, Q4H PRN    dextrose 50%, 12.5 g, Intravenous, PRN    dextrose 50%, 25 g, Intravenous, PRN    diphenhydrAMINE, 25 mg, Oral, Q6H PRN    famotidine, 20 mg, Oral, BID PRN    glucagon (human recombinant), 1 mg, Intramuscular, PRN    glucose, 16 g, Oral, PRN    glucose, 24 g, Oral, PRN    HYDROcodone-acetaminophen, 1 tablet, Oral, Q6H PRN    hydrOXYzine HCL, 25 mg, Oral, TID PRN    insulin aspart U-100, 0-5 Units, Subcutaneous, QID (AC + HS) PRN    melatonin, 6 mg, Oral, Nightly PRN    naloxone, 0.02 mg, Intravenous, PRN    nitroGLYCERIN, 0.4 mg, Sublingual, Q5 Min PRN    ondansetron, 4 mg, Intravenous, Q6H PRN    simethicone, 1 tablet, Oral, TID PRN    sodium chloride 0.9%, 10 mL, " Intravenous, PRN    Family History       Problem Relation (Age of Onset)    COPD Brother    Cancer Mother    Colon cancer Sister    Heart disease Sister          Tobacco Use    Smoking status: Never     Passive exposure: Never    Smokeless tobacco: Never   Substance and Sexual Activity    Alcohol use: No    Drug use: Never    Sexual activity: Not Currently       Review of Systems   Constitutional:  Positive for activity change and fatigue.   Respiratory:  Positive for chest tightness and shortness of breath.    Musculoskeletal:  Negative for gait problem.   Neurological:  Positive for weakness.     Objective:     Vital Signs (Most Recent):  Temp: 98.5 °F (36.9 °C) (03/31/25 1309)  Pulse: 75 (03/31/25 1309)  Resp: 20 (03/31/25 1309)  BP: 132/81 (03/31/25 1309)  SpO2: 100 % (03/31/25 1309) Vital Signs (24h Range):  Temp:  [97.4 °F (36.3 °C)-98.5 °F (36.9 °C)] 98.5 °F (36.9 °C)  Pulse:  [59-94] 75  Resp:  [18-22] 20  SpO2:  [94 %-100 %] 100 %  BP: (103-145)/(63-87) 132/81     Weight: 62 kg (136 lb 11 oz)  Body mass index is 20.78 kg/m².       Physical Exam  Vitals and nursing note reviewed.   Constitutional:       Comments: Pt sitting in chair outside of room; stood and ambulated a few steps during visit as well    HENT:      Head: Normocephalic and atraumatic.   Pulmonary:      Effort: No respiratory distress.      Comments: Dyspnea and tachypnea initially during visit that improved with rest; increased symptoms with prolonged conversation   Neurological:      Mental Status: He is alert and oriented to person, place, and time.       Advance Care Planning   Advance Directives:   Living Will: No    LaPOST: No    Do Not Resuscitate Status: No    Medical Power of : No (pts 3 sons are legal surrogate decision makers but pt advocates that brother, Jorgito, is preferred MPOA)      Decision Making:  Patient answered questions  Goals of Care: The patient endorses that what is most important right now is to focus on  spending time at home, avoiding the hospital, remaining as independent as possible, symptom/pain control, and quality of life, even if it means sacrificing a little time    Accordingly, we have decided that the best plan to meet the patient's goals includes continuing with treatment         Significant Labs: All pertinent labs within the past 24 hours have been reviewed.  CBC:   Recent Labs   Lab 03/31/25  0451   WBC 10.04   HGB 14.9   HCT 46.1   MCV 85        BMP:  Recent Labs   Lab 03/31/25 0451      K 4.3      CO2 21*   BUN 28*   CREATININE 1.0   CALCIUM 9.9   MG 2.3     LFT:  Lab Results   Component Value Date    AST 14 03/31/2025    ALKPHOS 69 03/31/2025    BILITOT 0.4 03/31/2025     Albumin:   Albumin   Date Value Ref Range Status   03/31/2025 3.6 3.5 - 5.2 g/dL Final   03/17/2025 2.9 (L) 3.5 - 5.2 g/dL Final     Protein:   Total Protein   Date Value Ref Range Status   03/17/2025 6.7 6.0 - 8.4 g/dL Final     Lactic acid:   Lab Results   Component Value Date    LACTATE 1.6 01/16/2025    LACTATE 3.2 (H) 01/16/2025       Significant Imaging: I have reviewed all pertinent imaging results/findings within the past 24 hours.

## 2025-03-31 NOTE — ASSESSMENT & PLAN NOTE
Lab Results   Component Value Date    HGBA1C 7.8 (H) 01/02/2025     POCT Glucose   Date Value Ref Range Status   03/30/2025 453 (HH) 70 - 110 mg/dL Final   03/30/2025 413 (H) 70 - 110 mg/dL Final   03/30/2025 311 (H) 70 - 110 mg/dL Final   03/30/2025 347 (H) 70 - 110 mg/dL Final   03/30/2025 156 (H) 70 - 110 mg/dL Final   03/29/2025 171 (H) 70 - 110 mg/dL Final     Low dose correction scale   Cont blood glucose monitoring   BG goal:  Preprandial blood glucose target <140 mg/dL  Random glucoses <180 mg/dL  Avoid hypoglycemia -  Reduce antihyperglycemic therapy when caloric intake is reduced. Avoid insulin stacking as a result of repeated injection of prandial insulin at close intervals.   Avoid severe hyperglycemia  ADA diet  Antihyperglycemics (From admission, onward)      Start     Stop Route Frequency Ordered    03/29/25 2138  insulin aspart U-100 pen 0-5 Units         -- SubQ Before meals & nightly PRN 03/29/25 2039

## 2025-03-31 NOTE — NURSING
Pt ambulating around room. Tele box on and in place. Pt A&O x4. NADN, pt requesting medication for headache and indegestion. O2 flowing @ 3L/min via NC. Pt tolerating well. No other pain or discomfort noted at this time. Bed in lowest position, personal items and call light within reach, instructed to call for help if needed.    [facility] [place]  Nurses Note -- 4 Eyes        March 30, 2025        Skin assessed on: Q Shift        [x] No Pressure Injuries Present                 [x]Prevention Measures Documented     [] Yes LDA  for Pressure Injury Previously documented      [] Yes New Pressure Injury Discovered              [] LDA for New Pressure Injury Added        Attending RN:  Suly Alarocn LPN      Second RN:  KANDI Nair

## 2025-03-31 NOTE — SUBJECTIVE & OBJECTIVE
Follow-up For:  Patient Active Problem List    Diagnosis Date Noted    IPF (idiopathic pulmonary fibrosis) 02/18/2025    Chronic hypoxemic respiratory failure 12/10/2024     Discharge Planning   DERIC:           Interval History:   Subjective: Emmanuel Grant is being followed for IPF (idiopathic pulmonary fibrosis). No acute events overnight. Lying in bed comfortable appearing. Does not appear to be in distress. He is complaining of constipation. Plan for palliative care consult today.     Symptoms: The patient reports shortness of breath, and cough. He denies chest pain, weakness, headache, chest pressure, anorexia, diarrhea and anxiety.     Diet: Diet Consistent Carbohydrate 2000 Calories (up to 75 gm per meal); Low Sodium,2gm, Heart Healthy; Thin; Standard Tray. Adequate intake. Eating 100% of meals. The patient denies nausea and vomiting.    Last Bowel Movement: 03/27/25    Pain: The patient Complains of pain that is mild. The patient reports pain is requiring pain meds. Pain is well controlled.      Data  Details     [x]   Lab results reviewed 3/31/2025 BMP and CBC stable. Lipase 108    []   Micro reports reviewed 3/31/2025     []   Pathology reports reviewed 3/31/2025     []   Imaging reports reviewed 3/31/2025     []   Cardiology Procedure reports reviewed 3/31/2025     []   Non- records/CareEverywhere notes reviewed 3/31/2025      []  Tests/studies orders placed or verified 3/31/2025       []  Independently viewed/assessed 3/31/2025      []  3/31/2025 Discussion of:        Review of Systems   Constitutional:  Negative for chills and fever.   Respiratory:  Negative for cough and shortness of breath.    Cardiovascular:  Negative for chest pain and palpitations.   Gastrointestinal:  Negative for abdominal pain.        Scheduled Meds:   amLODIPine  10 mg Oral Daily    arformoteroL  15 mcg Nebulization BID    aspirin  81 mg Oral Daily    budesonide  1 mg Nebulization BID    enoxparin  40 mg Subcutaneous Daily     latanoprost  1 drop Both Eyes QHS    metoprolol succinate  50 mg Oral Daily    mycophenolate  1,000 mg Oral BID    oxybutynin  5 mg Oral Daily    pantoprazole  40 mg Oral Daily    predniSONE  20 mg Oral Daily    Followed by    [START ON 4/5/2025] predniSONE  10 mg Oral Daily    Followed by    [START ON 4/10/2025] predniSONE  5 mg Oral Daily    sacubitriL-valsartan  1 tablet Oral BID    sucralfate  1 g Oral QID (AC & HS)    sulfamethoxazole-trimethoprim 800-160mg  1 tablet Oral Daily     Continuous Infusions:  PRN Meds:.  Current Facility-Administered Medications:     acetaminophen, 650 mg, Oral, Q8H PRN    acetaminophen, 650 mg, Oral, Q4H PRN    albuterol-ipratropium, 3 mL, Nebulization, Q4H PRN    dextromethorphan-guaiFENesin  mg/5 ml, 10 mL, Oral, Q4H PRN    dextrose 50%, 12.5 g, Intravenous, PRN    dextrose 50%, 25 g, Intravenous, PRN    diphenhydrAMINE, 25 mg, Oral, Q6H PRN    famotidine, 20 mg, Oral, BID PRN    glucagon (human recombinant), 1 mg, Intramuscular, PRN    glucose, 16 g, Oral, PRN    glucose, 24 g, Oral, PRN    HYDROcodone-acetaminophen, 1 tablet, Oral, Q6H PRN    insulin aspart U-100, 0-5 Units, Subcutaneous, QID (AC + HS) PRN    melatonin, 6 mg, Oral, Nightly PRN    naloxone, 0.02 mg, Intravenous, PRN    nitroGLYCERIN, 0.4 mg, Sublingual, Q5 Min PRN    ondansetron, 4 mg, Intravenous, Q6H PRN    prochlorperazine, 5 mg, Intravenous, Q6H PRN    senna-docusate 8.6-50 mg, 1 tablet, Oral, BID PRN    sodium chloride 0.9%, 10 mL, Intravenous, PRN      Objective:     Vitals:    03/30/25 2040 03/30/25 2342 03/31/25 0331 03/31/25 0447   BP:  103/63 (!) 145/76    BP Location:       Patient Position:       Pulse: 94 88 81    Resp: 20 18 18 18   Temp:  97.6 °F (36.4 °C) 97.4 °F (36.3 °C)    TempSrc:  Oral Oral    SpO2: 98% (!) 94% 98%    Weight:       Height:           Patient Vitals for the past 72 hrs (Last 3 readings):   Weight   03/30/25 1630 62 kg (136 lb 11 oz)   03/29/25 1507 59 kg (130 lb)        Intake/Output Summary (Last 24 hours) at 3/31/2025 0658  Last data filed at 3/31/2025 0345  Gross per 24 hour   Intake 340 ml   Output 300 ml   Net 40 ml     Net IO Since Admission: -1,660 mL [03/31/25 0658]    Physical Exam  Vitals and nursing note reviewed.   Constitutional:       General: He is awake. He is not in acute distress.     Appearance: He is underweight. He is ill-appearing.      Interventions: Nasal cannula in place.   HENT:      Head: Normocephalic and atraumatic.   Cardiovascular:      Rate and Rhythm: Normal rate.   Pulmonary:      Effort: No respiratory distress.   Neurological:      Mental Status: He is alert and oriented to person, place, and time. Mental status is at baseline.   Psychiatric:         Mood and Affect: Mood normal.         Behavior: Behavior normal. Behavior is cooperative.       Significant Labs: All pertinent labs within the past 24 hours have been reviewed.    BMP (Last 3 Results):   Recent Labs   Lab 03/29/25  1631 03/30/25  0535 03/31/25  0451    141 136   K 3.9 4.1 4.3    105 102   CO2 28 25 21*   BUN 21 18 28*   CREATININE 0.9 0.8 1.0   CALCIUM 9.2 8.8 9.9   MG  --  1.9 2.3     CBC (Last 3 Results):   Recent Labs   Lab 03/29/25  1631 03/30/25  0535 03/31/25  0451   WBC 10.57 9.63 10.04   RBC 4.93 4.81 5.42   HGB 13.8* 13.3* 14.9   HCT 39.8* 39.5* 46.1    311 308   MCV 81* 82 85   MCH 28.0 27.7 27.5   MCHC 34.7 33.7 32.3       Significant Imaging: I have reviewed all pertinent imaging results/findings within the past 24 hours.  CTA Chest Non-Coronary (PE Studies)  Narrative: EXAMINATION:  CTA CHEST NON CORONARY (PE STUDIES)    CLINICAL HISTORY:  Pulmonary embolism (PE) suspected, neg D-dimer;    TECHNIQUE:  Low dose axial images, sagittal and coronal reformations were obtained from the thoracic inlet to the lung bases following the IV administration of 75 mL of Omnipaque 350.  Contrast timing was optimized to evaluate the pulmonary arteries.  MIP  images were performed.    COMPARISON:  Chest x-ray from 03/29/2025    FINDINGS:  Lower neck:  Unremarkable    Lungs/pleura: There is honeycombing in the bilateral lung bases with findings compatible traction bronchiectasis.  Additional fibrotic changes in the bilateral lungs are redemonstrated.    Vessels:  No filling defects to suggest pulmonary embolism in the trunk, segmental and subsegmental branches.    Heart: Coronary artery disease.    Mediastinum: Unremarkable    Upper abdomen: Mildly prominent spleen unchanged since prior.    Chest wall: Unremarkable    Bones: Osseous demineralization.  Multilevel degenerative changes in the spine.  Impression: 1. No evidence to suggest pulmonary embolism.  2. Redemonstration of the fibrotic changes in the bilateral lungs suggestive of UIP.    Electronically signed by: Edgardo Schilling  Date:    03/30/2025  Time:    14:54

## 2025-03-31 NOTE — PLAN OF CARE
Problem: Adult Inpatient Plan of Care  Goal: Plan of Care Review  Outcome: Progressing  Flowsheets (Taken 3/31/2025 1551)  Plan of Care Reviewed With: patient  Goal: Patient-Specific Goal (Individualized)  Outcome: Progressing  Goal: Absence of Hospital-Acquired Illness or Injury  Outcome: Progressing  Intervention: Identify and Manage Fall Risk  Flowsheets (Taken 3/31/2025 1551)  Safety Promotion/Fall Prevention:   assistive device/personal item within reach   Fall Risk reviewed with patient/family   nonskid shoes/socks when out of bed   high risk medications identified   medications reviewed   side rails raised x 2  Intervention: Prevent Skin Injury  Flowsheets (Taken 3/31/2025 1551)  Body Position: weight shifting  Intervention: Prevent and Manage VTE (Venous Thromboembolism) Risk  Flowsheets (Taken 3/31/2025 1551)  VTE Prevention/Management:   ambulation promoted   bleeding precautions maintained   bleeding risk assessed  Intervention: Prevent Infection  Flowsheets (Taken 3/31/2025 1551)  Infection Prevention:   single patient room provided   hand hygiene promoted  Goal: Optimal Comfort and Wellbeing  Outcome: Progressing  Intervention: Monitor Pain and Promote Comfort  Flowsheets (Taken 3/31/2025 1551)  Pain Management Interventions:   quiet environment facilitated   relaxation techniques promoted  Intervention: Provide Person-Centered Care  Flowsheets (Taken 3/31/2025 1551)  Trust Relationship/Rapport:   care explained   choices provided   emotional support provided   empathic listening provided   questions answered   questions encouraged   reassurance provided   thoughts/feelings acknowledged  Goal: Readiness for Transition of Care  Outcome: Progressing     Problem: Coping Ineffective  Goal: Effective Coping  Outcome: Progressing  Intervention: Support and Enhance Coping Strategies  Flowsheets (Taken 3/31/2025 1551)  Supportive Measures:   active listening utilized   counseling provided   decision-making  supported   problem-solving facilitated   relaxation techniques promoted   self-care encouraged   self-responsibility promoted   self-reflection promoted   verbalization of feelings encouraged  Family/Support System Care:   self-care encouraged   support provided     Problem: Diabetes Comorbidity  Goal: Blood Glucose Level Within Targeted Range  Outcome: Progressing  Intervention: Monitor and Manage Glycemia  Flowsheets (Taken 3/31/2025 1551)  Glycemic Management: blood glucose monitored      Pt AAOx4. Able to make needs known. Pain managed by PRN pain meds, tolerated well. Oral meds tolerated well with no s/s of adverse effects. Bed in lowest position, call light within reach, and instructed to call for any assistance. Continue with plan of care.

## 2025-03-31 NOTE — PROGRESS NOTES
[facility]  Telemedicine Progress Note    Patient Name: Emmanuel Grant  MRN: [id number]  Patient Class: OP- Observation   Admission Date: 3/29/2025  Length of Stay: 0 days  Attending Physician: Roel Pelaez MD  Primary Care Provider: Wilfredo De Souza MD          Subjective:     Principal Problem:IPF (idiopathic pulmonary fibrosis)        HPI:  74-year-old male with history of hypertension, diabetes, cardiomyopathy and pulmonary fibrosis requiring 3 L by nasal cannula at baseline on Ofev, MMF and bactrim prophylaxis who presents with intermittent chest pressure over the past weeks for which a stress test occurred and demonstrated a fixed defect who presents with 2 days of worsening shortness of breath in addition to his newly developing abdominal discomfort.    As stated, for the past weeks the patient has been having intermittent chest discomfort.  Over the past days however he has had shortness of breath which seems to have worsened with exertion.  No fevers.  No new or worsening lower extremity edema.  No new productive cough.    Regarding his abdominal discomfort, it is diffuse.  He has had reduced oral intake but has continued to have bowel movements.  At the time of my interview it has essentially resolved and he is asking for food.        Overview/Hospital Course:  73 yo male with history of pulmonary fibrosis on Nintedanib and mmf and chronic hypoxic respiratory failure on home o2 at 3 LPM. Pulmonary was consulted for history of pulmonary fibrosis. Reports low - suspicion of suspect acute exacerbation of ILD at this time. CTA chest performed and negative for pulmonary embolism, but reveals known fibrotic changes. Recommendations to continue Mycophenolate, bactrim ppx, and steroid wean at this time.     A Palliative care consult was placed given several recent hospitalizations for similar symptoms within in the past year - 3/23-3/25; 3/16-3/27; 1/16-/120; 12/12-12/16.         Follow-up For:  Patient Active Problem List    Diagnosis Date Noted    IPF (idiopathic pulmonary fibrosis) 02/18/2025    Chronic hypoxemic respiratory failure 12/10/2024     Discharge Planning   DERIC:           Interval History:   Subjective: Emmanuel Grant is being followed for IPF (idiopathic pulmonary fibrosis). No acute events overnight. Lying in bed comfortable appearing. Does not appear to be in distress. He is complaining of constipation. Plan for palliative care consult today.     Symptoms: The patient reports shortness of breath, and cough. He denies chest pain, weakness, headache, chest pressure, anorexia, diarrhea and anxiety.     Diet: Diet Consistent Carbohydrate 2000 Calories (up to 75 gm per meal); Low Sodium,2gm, Heart Healthy; Thin; Standard Tray. Adequate intake. Eating 100% of meals. The patient denies nausea and vomiting.    Last Bowel Movement: 03/27/25    Pain: The patient Complains of pain that is mild. The patient reports pain is requiring pain meds. Pain is well controlled.      Data  Details     [x]   Lab results reviewed 3/31/2025 BMP and CBC stable. Lipase 108    []   Micro reports reviewed 3/31/2025     []   Pathology reports reviewed 3/31/2025     []   Imaging reports reviewed 3/31/2025     []   Cardiology Procedure reports reviewed 3/31/2025     []   Non- records/CareEverywhere notes reviewed 3/31/2025      []  Tests/studies orders placed or verified 3/31/2025       []  Independently viewed/assessed 3/31/2025      []  3/31/2025 Discussion of:        Review of Systems   Constitutional:  Negative for chills and fever.   Respiratory:  Negative for cough and shortness of breath.    Cardiovascular:  Negative for chest pain and palpitations.   Gastrointestinal:  Negative for abdominal pain.        Scheduled Meds:   amLODIPine  10 mg Oral Daily    arformoteroL  15 mcg Nebulization BID    aspirin  81 mg Oral Daily    budesonide  1 mg Nebulization BID    enoxparin  40 mg Subcutaneous  Daily    latanoprost  1 drop Both Eyes QHS    metoprolol succinate  50 mg Oral Daily    mycophenolate  1,000 mg Oral BID    oxybutynin  5 mg Oral Daily    pantoprazole  40 mg Oral Daily    predniSONE  20 mg Oral Daily    Followed by    [START ON 4/5/2025] predniSONE  10 mg Oral Daily    Followed by    [START ON 4/10/2025] predniSONE  5 mg Oral Daily    sacubitriL-valsartan  1 tablet Oral BID    sucralfate  1 g Oral QID (AC & HS)    sulfamethoxazole-trimethoprim 800-160mg  1 tablet Oral Daily     Continuous Infusions:  PRN Meds:.  Current Facility-Administered Medications:     acetaminophen, 650 mg, Oral, Q8H PRN    acetaminophen, 650 mg, Oral, Q4H PRN    albuterol-ipratropium, 3 mL, Nebulization, Q4H PRN    dextromethorphan-guaiFENesin  mg/5 ml, 10 mL, Oral, Q4H PRN    dextrose 50%, 12.5 g, Intravenous, PRN    dextrose 50%, 25 g, Intravenous, PRN    diphenhydrAMINE, 25 mg, Oral, Q6H PRN    famotidine, 20 mg, Oral, BID PRN    glucagon (human recombinant), 1 mg, Intramuscular, PRN    glucose, 16 g, Oral, PRN    glucose, 24 g, Oral, PRN    HYDROcodone-acetaminophen, 1 tablet, Oral, Q6H PRN    insulin aspart U-100, 0-5 Units, Subcutaneous, QID (AC + HS) PRN    melatonin, 6 mg, Oral, Nightly PRN    naloxone, 0.02 mg, Intravenous, PRN    nitroGLYCERIN, 0.4 mg, Sublingual, Q5 Min PRN    ondansetron, 4 mg, Intravenous, Q6H PRN    prochlorperazine, 5 mg, Intravenous, Q6H PRN    senna-docusate 8.6-50 mg, 1 tablet, Oral, BID PRN    sodium chloride 0.9%, 10 mL, Intravenous, PRN      Objective:     Vitals:    03/30/25 2040 03/30/25 2342 03/31/25 0331 03/31/25 0447   BP:  103/63 (!) 145/76    BP Location:       Patient Position:       Pulse: 94 88 81    Resp: 20 18 18 18   Temp:  97.6 °F (36.4 °C) 97.4 °F (36.3 °C)    TempSrc:  Oral Oral    SpO2: 98% (!) 94% 98%    Weight:       Height:           Patient Vitals for the past 72 hrs (Last 3 readings):   Weight   03/30/25 1630 62 kg (136 lb 11 oz)   03/29/25 1507 59 kg (130  lb)       Intake/Output Summary (Last 24 hours) at 3/31/2025 0658  Last data filed at 3/31/2025 0345  Gross per 24 hour   Intake 340 ml   Output 300 ml   Net 40 ml     Net IO Since Admission: -1,660 mL [03/31/25 0658]    Physical Exam  Vitals and nursing note reviewed.   Constitutional:       General: He is awake. He is not in acute distress.     Appearance: He is underweight. He is ill-appearing.      Interventions: Nasal cannula in place.   HENT:      Head: Normocephalic and atraumatic.   Cardiovascular:      Rate and Rhythm: Normal rate.   Pulmonary:      Effort: No respiratory distress.   Neurological:      Mental Status: He is alert and oriented to person, place, and time. Mental status is at baseline.   Psychiatric:         Mood and Affect: Mood normal.         Behavior: Behavior normal. Behavior is cooperative.       Significant Labs: All pertinent labs within the past 24 hours have been reviewed.    BMP (Last 3 Results):   Recent Labs   Lab 03/29/25  1631 03/30/25  0535 03/31/25  0451    141 136   K 3.9 4.1 4.3    105 102   CO2 28 25 21*   BUN 21 18 28*   CREATININE 0.9 0.8 1.0   CALCIUM 9.2 8.8 9.9   MG  --  1.9 2.3     CBC (Last 3 Results):   Recent Labs   Lab 03/29/25  1631 03/30/25  0535 03/31/25  0451   WBC 10.57 9.63 10.04   RBC 4.93 4.81 5.42   HGB 13.8* 13.3* 14.9   HCT 39.8* 39.5* 46.1    311 308   MCV 81* 82 85   MCH 28.0 27.7 27.5   MCHC 34.7 33.7 32.3       Significant Imaging: I have reviewed all pertinent imaging results/findings within the past 24 hours.  CTA Chest Non-Coronary (PE Studies)  Narrative: EXAMINATION:  CTA CHEST NON CORONARY (PE STUDIES)    CLINICAL HISTORY:  Pulmonary embolism (PE) suspected, neg D-dimer;    TECHNIQUE:  Low dose axial images, sagittal and coronal reformations were obtained from the thoracic inlet to the lung bases following the IV administration of 75 mL of Omnipaque 350.  Contrast timing was optimized to evaluate the pulmonary arteries.  MIP  images were performed.    COMPARISON:  Chest x-ray from 03/29/2025    FINDINGS:  Lower neck:  Unremarkable    Lungs/pleura: There is honeycombing in the bilateral lung bases with findings compatible traction bronchiectasis.  Additional fibrotic changes in the bilateral lungs are redemonstrated.    Vessels:  No filling defects to suggest pulmonary embolism in the trunk, segmental and subsegmental branches.    Heart: Coronary artery disease.    Mediastinum: Unremarkable    Upper abdomen: Mildly prominent spleen unchanged since prior.    Chest wall: Unremarkable    Bones: Osseous demineralization.  Multilevel degenerative changes in the spine.  Impression: 1. No evidence to suggest pulmonary embolism.  2. Redemonstration of the fibrotic changes in the bilateral lungs suggestive of UIP.    Electronically signed by: Edgardo Schilling  Date:    03/30/2025  Time:    14:54        Assessment/Plan:      Assessment & Plan  IPF (idiopathic pulmonary fibrosis)  Symptoms likely attributed to progressively worsening ILD.   Continue continue Mycophenolate and Ofev.    He is currently on a prednisone taper which has been continued  On Bactrim prophylaxis.    Patient instructed as family to bring in Ofev as not on formulary  Pulmonology consulted and following  Palliative care consult on Monday to discuss progressively worsening disease and goals of care.   Type 2 diabetes mellitus without complication, without long-term current use of insulin  Lab Results   Component Value Date    HGBA1C 7.8 (H) 01/02/2025     POCT Glucose   Date Value Ref Range Status   03/30/2025 453 (HH) 70 - 110 mg/dL Final   03/30/2025 413 (H) 70 - 110 mg/dL Final   03/30/2025 311 (H) 70 - 110 mg/dL Final   03/30/2025 347 (H) 70 - 110 mg/dL Final   03/30/2025 156 (H) 70 - 110 mg/dL Final   03/29/2025 171 (H) 70 - 110 mg/dL Final     Low dose correction scale   Cont blood glucose monitoring   BG goal:  Preprandial blood glucose target <140 mg/dL  Random glucoses  <180 mg/dL  Avoid hypoglycemia -  Reduce antihyperglycemic therapy when caloric intake is reduced. Avoid insulin stacking as a result of repeated injection of prandial insulin at close intervals.   Avoid severe hyperglycemia  ADA diet  Antihyperglycemics (From admission, onward)      Start     Stop Route Frequency Ordered    03/29/25 2138  insulin aspart U-100 pen 0-5 Units         -- SubQ Before meals & nightly PRN 03/29/25 2039           Coronary artery disease involving native coronary artery of native heart with angina pectoris  Patient with known CAD   Currently denies CP  Monitor for S/Sx of angina/ACS.   Maintain K > 4, Mag > 2 and Ca WNL to decrease arrhythmogenic potential  Continue to monitor on telemetry.   Will continue ASA and Statin  Antiplatelets:   aspirin - 81 MG  aspirin Chew - 81 MG  Statin:  rosuvastatin - 20 MG   GERD (gastroesophageal reflux disease)  Continue PPI  Essential hypertension  Patient's blood pressure range in the last 24 hours was: BP  Min: 103/63  Max: 145/76.The patient's inpatient anti-hypertensive regimen is listed below:  Current Antihypertensives  amLODIPine tablet 10 mg, Daily, Oral  metoprolol succinate (TOPROL-XL) 24 hr tablet 50 mg, Daily, Oral  nitroGLYCERIN SL tablet 0.4 mg, Every 5 min PRN, Sublingual    Plan  - BP is controlled, no changes needed to their regimen  -   Chest pain  Troponin trend negative. EKG without acute changes.   Recent stress test with severe intensity, fixed perfusion abnormality consistent with scar in the anteroapical wall.     Immunosuppression due to drug therapy  Cont home meds  On daily Bactrim for oppurtunistic infections.   Chronic hypoxemic respiratory failure  Patient with Hypoxic Respiratory failure which is Chronic.  he is on home oxygen at 3 LPM. Supplemental oxygen was provided and noted-      .     Chronic diastolic CHF (congestive heart failure)  Results for orders placed during the hospital encounter of  03/16/25    Echo    Interpretation Summary    Left Ventricle: The left ventricle is normal in size. There is mildly reduced systolic function with a visually estimated ejection fraction of 40 - 45%.    Right Ventricle: The right ventricle is normal in size. Systolic function is normal.    Aortic Valve: There is mild aortic valve sclerosis.    Mitral Valve: There is moderate mitral annular calcification.    Aorta: Aortic root is mildly dilated measuring 3.73 cm.    Pulmonary Artery: The estimated pulmonary artery systolic pressure is 30 mmHg.    IVC/SVC: Normal venous pressure at 3 mmHg.    Recent Labs   Lab 03/29/25  1631   *     Does not seem volume overloaded.   Cont to monitor I/O's and daily weights.  Fluid restriction (2 liters/24 hours)  Low Na diet  Monitor for signs of fluid overload: RR>30, O2 sat<92%, weight gain of >3 lbs, or urinary output <160ml/8hr  Maintain oxygen sats >92% via NC     Patient Vitals for the past 72 hrs (Last 3 readings):   Weight   03/30/25 1630 62 kg (136 lb 11 oz)   03/29/25 1507 59 kg (130 lb)     Diuretics (From admission, onward)      None          Cardiac/Autonomic (From admission, onward)      Start     Stop Route Frequency Ordered    03/30/25 0900  amLODIPine tablet 10 mg         -- Oral Daily 03/29/25 2039 03/30/25 0900  metoprolol succinate (TOPROL-XL) 24 hr tablet 50 mg         -- Oral Daily 03/29/25 2039 03/29/25 2145  sacubitriL-valsartan 24-26 mg per tablet 1 tablet         -- Oral 2 times daily 03/29/25 2039 03/29/25 2133  nitroGLYCERIN SL tablet 0.4 mg         -- SL Every 5 min PRN 03/29/25 2039          VTE Risk Mitigation (From admission, onward)           Ordered     enoxaparin injection 40 mg  Daily         03/29/25 2039     Place sequential compression device  Until discontinued         03/29/25 2039     IP VTE HIGH RISK PATIENT  Once         03/29/25 2039     Place sequential compression device  Until discontinued         03/29/25 2039                           I have completed this tele-visit without the assistance of a telepresenter.    The attending portion of this evaluation, treatment, and documentation was performed per Roel Lehman MD via Telemedicine AudioVisual using the secure [employer] software platform with 2 way audio/video. The provider was located off-site and the patient is located in the hospital. The aforementioned video software was utilized to document the relevant history and physical exam    Roel Lehman MD  Department of Hospital Medicine   [facility]

## 2025-03-31 NOTE — ASSESSMENT & PLAN NOTE
- chronic condition noted; has contributed to frequent hospitalizations over the past approx 2 years   - since 1/25 pt has sought ED care 6 times, 4 resulting in overnight care/admissions   - pt shares ongoing decline is ability to care for his home etc due to [place]   - follows with Dr. Mix [facility] for pulmonology   - pt on home O2 at baseline   - initiated and plan for further discussion of trajectory of life-limiting condition and associated GOC  - pulm consulted per HM; cont mgmt per pulm and HM

## 2025-03-31 NOTE — ASSESSMENT & PLAN NOTE
Symptoms likely attributed to progressively worsening ILD.   Continue continue Mycophenolate and Ofev.    He is currently on a prednisone taper which has been continued  On Bactrim prophylaxis.    Patient instructed as family to bring in Ofev as not on formulary  Pulmonology consulted and following  Palliative care consult on Monday to discuss progressively worsening disease and goals of care.

## 2025-03-31 NOTE — PROGRESS NOTES
Chart reviewed. Patient remains on baseline O2 needs and no increased respiratory symptoms reported from his baseline. Can continue home medications and follow up in clinic. Will sign off. Call with questions or change in status.

## 2025-03-31 NOTE — PLAN OF CARE
Problem: Adult Inpatient Plan of Care  Goal: Plan of Care Review  Outcome: Progressing  Goal: Patient-Specific Goal (Individualized)  Outcome: Progressing  Goal: Absence of Hospital-Acquired Illness or Injury  Outcome: Progressing  Goal: Optimal Comfort and Wellbeing  Outcome: Progressing  Goal: Readiness for Transition of Care  Outcome: Progressing     Problem: Coping Ineffective  Goal: Effective Coping  Outcome: Progressing     Problem: Diabetes Comorbidity  Goal: Blood Glucose Level Within Targeted Range  Outcome: Progressing

## 2025-03-31 NOTE — CONSULTS
[facility] Surg  Palliative Medicine  Consult Note    Patient Name: Emmanuel Grant  MRN: [id number]  Admission Date: 3/29/2025  Hospital Length of Stay: 0 days  Code Status: Full Code   Attending Provider: Roel Pelaez MD  Consulting Provider: Janis Glaser NP  Primary Care Physician: Wilfredo De Souza MD  Principal Problem:IPF (idiopathic pulmonary fibrosis)    Patient information was obtained from patient, past medical records, ER records, and primary team.      Inpatient consult to Palliative Care  Consult performed by: Janis Glaser NP  Consult ordered by: Umang Miller PA-C  Reason for consult: goals of care and advanced care planning        Assessment/Plan:   Palliative Care  Advance Care Planning   3/31/2025  - consult received; interval chart reviewed in detail  - met with patient at bedside; introduction to palliative medicine team and role in current care and admission   - pt sitting in chair in hallway outside of room; offered to move discussion/visit into room for privacy, pt preferred to stay in benitez as he wished to go for a walk after visit and didn't like being isolated in his room   - learned more about pt outside of current admission; pt lives alone in [place] and feels that he is able to safely perform ADLs and proudly shares that he still cooks; he does share some struggles with house chores due to stamina and [place]   - pt did inquire about insurance coverage for care assistance for home care needs, house keeping etc; encouraged to discuss with CM/SW, will share with CM as well   - pt has 3 sons who all live out of [facility] ([place]); his brother Jorgito lives locally and assists as needed   - GOC/ACP discussion  - pt identifies his brother Jorgito as preferred MPOA, mainly die to proximity and availability; discussed legal surrogate decision maker hierarchy and option for completion of MPOA documentation, which he is agreeable to; will have [facility] palliative  "SW follow up with pt   - reviewed wishes regarding code status; reviewed potential interventions, risks, and outcomes of full code status and alternative of DNR  - pt verbalized understanding of full code status currently; will require further discussion regarding specifics of wishes especially in relation to intubation as pt did wish for CPR, but was expressing potentially not wishing for intubation  - pt was very eager to switch to rolling O2 tank and to work on his walking, so we made plans to further discuss at next visit   - emotional support provided   - Allowed time for questions/concerns; all addressed; expressed availability of myself/palliative team for additional questions/concerns     Pulmonary  * IPF (idiopathic pulmonary fibrosis)  - chronic condition noted; has contributed to frequent hospitalizations over the past approx 2 years   - since 1/25 pt has sought ED care 6 times, 4 resulting in overnight care/admissions   - pt shares ongoing decline is ability to care for his home etc due to [place]   - follows with Dr. Mix [facility] for pulmonology   - pt on home O2 at baseline   - initiated and plan for further discussion of trajectory of life-limiting condition and associated GOC  - pulm consulted per HM; cont mgmt per pulm and      Chronic hypoxemic respiratory failure  - see IPF    Cardiac/Vascular  Chronic diastolic CHF (congestive heart failure)  - chronic condition noted; see also CAD  - most recent echo with EF 40-45%   - cont mgmt per HM     Coronary artery disease involving native coronary artery of native heart with angina pectoris  - chronic condition noted; pt reports intermittent chest "pressure" which has lead to admissions as well   - pt with stent hx and follows with Dr. Ibrahim [facility] as OP   - cont mgmt per HM     Thank you for your consult. I will follow-up with patient. Please contact us if you have any additional questions.    Subjective:     HPI:   From H&P: "  74-year-old male with " "history of hypertension, diabetes, cardiomyopathy and pulmonary fibrosis requiring 3 L by nasal cannula at baseline on Ofev, MMF and bactrim prophylaxis who presents with intermittent chest pressure over the past weeks for which a stress test occurred and demonstrated a fixed defect who presents with 2 days of worsening shortness of breath in addition to his newly developing abdominal discomfort.     As stated, for the past weeks the patient has been having intermittent chest discomfort.  Over the past days however he has had shortness of breath which seems to have worsened with exertion.  No fevers.  No new or worsening lower extremity edema.  No new productive cough.     Regarding his abdominal discomfort, it is diffuse.  He has had reduced oral intake but has continued to have bowel movements.  At the time of my interview it has essentially resolved and he is asking for food."    Palliative medicine consulted for goals of care discussion and advance care planning; for details of visit, see advance care planning section of plan.         Hospital Course:  No notes on file    Past Medical History:   Diagnosis Date    Acute hypoxic respiratory failure 09/23/2024    Oxygen therapy 24/7 now, continue 2 L oxygen therapy.      BPH (benign prostatic hyperplasia) 02/28/2024    CAD (coronary artery disease)     Sees [facility], h/o stent    Chronic HFrEF (heart failure with reduced ejection fraction) 05/01/2024    Diabetes mellitus     Hypertension     Kidney stone     Stroke     age 62    Type 2 diabetes mellitus without complication, without long-term current use of insulin 10/08/2021       Past Surgical History:   Procedure Laterality Date    24 HOUR IMPEDANCE PH MONITORING OF ESOPHAGUS IN PATIENT NOT TAKING ACID REDUCING MEDICATIONS N/A 11/13/2024    Procedure: IMPEDANCE PH STUDY, ESOPHAGEAL, 24 HOUR, IN PATIENT NOT TAKING ACID REDUCING MEDICATION;  Surgeon: Stephany Mendoza MD;  [facility] ([address] [place] [address]);  Service: " "Endoscopy;  Laterality: N/A;  referral dr miguel/ off ppi / prep inst sent to pt via mail  11/6- precall attempted no answer. Unable to [facility]-  11/8 - pt not called, per chart review patient currently inpatient at [place] [id number]    CARDIAC CATHETERIZATION      with stent    COLONOSCOPY N/A 03/27/2024    Procedure: COLONOSCOPY;  Surgeon: Ariela Castro MD;  Location: [facility];  Service: Endoscopy;  Laterality: N/A;    ESOPHAGEAL MANOMETRY WITH MEASUREMENT OF IMPEDANCE N/A 11/13/2024    Procedure: MANOMETRY, ESOPHAGUS, WITH IMPEDANCE MEASUREMENT;  Surgeon: Stephany Mendoza MD;  Location: [facility] ENDO ([facility] FLR);  Service: Endoscopy;  Laterality: N/A;    ESOPHAGOGASTRODUODENOSCOPY N/A 03/27/2024    Procedure: EGD (ESOPHAGOGASTRODUODENOSCOPY);  Surgeon: Ariela Castro MD;  Location: [facility] ENDO;  Service: Endoscopy;  Laterality: N/A;    LEFT HEART CATHETERIZATION Left 9/10/2024    Procedure: Left heart cath;  Surgeon: Jemal Drummond MD;  Location: [facility] CATH LAB;  Service: Cardiology;  Laterality: Left;    SHOULDER SURGERY Right        Review of patient's allergies indicates:   Allergen Reactions    Dapagliflozin      Other reaction(s): Other (See Comments)    Pcn [penicillins]     Linagliptin Other (See Comments)     "it knocked me down", "it almost killed me"    Lisinopril Other (See Comments)     cough    Pantoprazole Hives       Medications:  Continuous Infusions:  Scheduled Meds:   amLODIPine  10 mg Oral Daily    arformoteroL  15 mcg Nebulization BID    aspirin  81 mg Oral Daily    budesonide  1 mg Nebulization BID    enoxparin  40 mg Subcutaneous Daily    lactulose  15 g Oral TID    latanoprost  1 drop Both Eyes QHS    metoprolol succinate  50 mg Oral Daily    mineral oil  1 enema Rectal Once    mycophenolate  1,000 mg Oral BID    oxybutynin  5 mg Oral Daily    pantoprazole  40 mg Oral Daily    predniSONE  20 mg Oral Daily    Followed by    [START ON 4/5/2025] predniSONE  10 mg Oral Daily    " Followed by    [START ON 4/10/2025] predniSONE  5 mg Oral Daily    sacubitriL-valsartan  1 tablet Oral BID    sucralfate  1 g Oral QID (AC & HS)    sulfamethoxazole-trimethoprim 800-160mg  1 tablet Oral Daily     PRN Meds:  Current Facility-Administered Medications:     butalbital-acetaminophen-caffeine -40 mg, 1 tablet, Oral, Q4H PRN    dextromethorphan-guaiFENesin  mg/5 ml, 10 mL, Oral, Q4H PRN    dextrose 50%, 12.5 g, Intravenous, PRN    dextrose 50%, 25 g, Intravenous, PRN    diphenhydrAMINE, 25 mg, Oral, Q6H PRN    famotidine, 20 mg, Oral, BID PRN    glucagon (human recombinant), 1 mg, Intramuscular, PRN    glucose, 16 g, Oral, PRN    glucose, 24 g, Oral, PRN    HYDROcodone-acetaminophen, 1 tablet, Oral, Q6H PRN    hydrOXYzine HCL, 25 mg, Oral, TID PRN    insulin aspart U-100, 0-5 Units, Subcutaneous, QID (AC + HS) PRN    melatonin, 6 mg, Oral, Nightly PRN    naloxone, 0.02 mg, Intravenous, PRN    nitroGLYCERIN, 0.4 mg, Sublingual, Q5 Min PRN    ondansetron, 4 mg, Intravenous, Q6H PRN    simethicone, 1 tablet, Oral, TID PRN    sodium chloride 0.9%, 10 mL, Intravenous, PRN    Family History       Problem Relation (Age of Onset)    COPD Brother    Cancer Mother    Colon cancer Sister    Heart disease Sister          Tobacco Use    Smoking status: Never     Passive exposure: Never    Smokeless tobacco: Never   Substance and Sexual Activity    Alcohol use: No    Drug use: Never    Sexual activity: Not Currently       Review of Systems   Constitutional:  Positive for activity change and fatigue.   Respiratory:  Positive for chest tightness and shortness of breath.    Musculoskeletal:  Negative for gait problem.   Neurological:  Positive for weakness.     Objective:     Vital Signs (Most Recent):  Temp: 98.5 °F (36.9 °C) (03/31/25 1309)  Pulse: 75 (03/31/25 1309)  Resp: 20 (03/31/25 1309)  BP: 132/81 (03/31/25 1309)  SpO2: 100 % (03/31/25 1309) Vital Signs (24h Range):  Temp:  [97.4 °F (36.3 °C)-98.5 °F  (36.9 °C)] 98.5 °F (36.9 °C)  Pulse:  [59-94] 75  Resp:  [18-22] 20  SpO2:  [94 %-100 %] 100 %  BP: (103-145)/(63-87) 132/81     Weight: 62 kg (136 lb 11 oz)  Body mass index is 20.78 kg/m².       Physical Exam  Vitals and nursing note reviewed.   Constitutional:       Comments: Pt sitting in chair outside of room; stood and ambulated a few steps during visit as well    HENT:      Head: Normocephalic and atraumatic.   Pulmonary:      Effort: No respiratory distress.      Comments: Dyspnea and tachypnea initially during visit that improved with rest; increased symptoms with prolonged conversation   Neurological:      Mental Status: He is alert and oriented to person, place, and time.       Advance Care Planning   Advance Directives:   Living Will: No    LaPOST: No    Do Not Resuscitate Status: No    Medical Power of : No (pts 3 sons are legal surrogate decision makers but pt advocates that brother, Jorgito, is preferred MPOA)      Decision Making:  Patient answered questions  Goals of Care: The patient endorses that what is most important right now is to focus on spending time at home, avoiding the hospital, remaining as independent as possible, symptom/pain control, and quality of life, even if it means sacrificing a little time    Accordingly, we have decided that the best plan to meet the patient's goals includes continuing with treatment         Significant Labs: All pertinent labs within the past 24 hours have been reviewed.  CBC:   Recent Labs   Lab 03/31/25  0451   WBC 10.04   HGB 14.9   HCT 46.1   MCV 85        BMP:  Recent Labs   Lab 03/31/25  0451      K 4.3      CO2 21*   BUN 28*   CREATININE 1.0   CALCIUM 9.9   MG 2.3     LFT:  Lab Results   Component Value Date    AST 14 03/31/2025    ALKPHOS 69 03/31/2025    BILITOT 0.4 03/31/2025     Albumin:   Albumin   Date Value Ref Range Status   03/31/2025 3.6 3.5 - 5.2 g/dL Final   03/17/2025 2.9 (L) 3.5 - 5.2 g/dL Final     Protein:    Total Protein   Date Value Ref Range Status   03/17/2025 6.7 6.0 - 8.4 g/dL Final     Lactic acid:   Lab Results   Component Value Date    LACTATE 1.6 01/16/2025    LACTATE 3.2 (H) 01/16/2025       Significant Imaging: I have reviewed all pertinent imaging results/findings within the past 24 hours.    Total visit time: 86 minutes    70 min visit time including: face to face time in discussion of symptom assessment, and exploring options and burdens of offered treatments.  This also includes non-face to face time preparing to see the patient including chart review, obtaining and/or reviewing separately obtained history, documenting clinical information in the electronic or other health record, independently interpreting results and communicating results to the patient/family/caregiver, family discussions by phone if not able to be present, coordination of care with other specialists, and discharge planning.     16 min ACP time spent: goals of care, advanced care planning, emotional support, formulating and communicating prognosis, exploring burden/ benefit of various approaches of treatment.       Janis Glaser NP  Palliative Medicine  [facility]

## 2025-03-31 NOTE — ASSESSMENT & PLAN NOTE
"- chronic condition noted; pt reports intermittent chest "pressure" which has lead to admissions as well   - pt with stent hx and follows with Dr. Ibrahim [facility] as OP   - cont mgmt per    "

## 2025-03-31 NOTE — NURSING
[facility]  Nurses Note -- 4 Eyes      3/31/2025       Skin assessed on: Q Shift      [x] No Pressure Injuries Present    [x]Prevention Measures Documented    [] Yes LDA  for Pressure Injury Previously documented     [] Yes New Pressure Injury Discovered   [] LDA for New Pressure Injury Added      Attending RN:  Carolina Knox RN     Second RN:  GARY Tubbs

## 2025-03-31 NOTE — ASSESSMENT & PLAN NOTE
Results for orders placed during the hospital encounter of 03/16/25    Echo    Interpretation Summary    Left Ventricle: The left ventricle is normal in size. There is mildly reduced systolic function with a visually estimated ejection fraction of 40 - 45%.    Right Ventricle: The right ventricle is normal in size. Systolic function is normal.    Aortic Valve: There is mild aortic valve sclerosis.    Mitral Valve: There is moderate mitral annular calcification.    Aorta: Aortic root is mildly dilated measuring 3.73 cm.    Pulmonary Artery: The estimated pulmonary artery systolic pressure is 30 mmHg.    IVC/SVC: Normal venous pressure at 3 mmHg.    Recent Labs   Lab 03/29/25  1631   *     Does not seem volume overloaded.   Cont to monitor I/O's and daily weights.  Fluid restriction (2 liters/24 hours)  Low Na diet  Monitor for signs of fluid overload: RR>30, O2 sat<92%, weight gain of >3 lbs, or urinary output <160ml/8hr  Maintain oxygen sats >92% via NC     Patient Vitals for the past 72 hrs (Last 3 readings):   Weight   03/30/25 1630 62 kg (136 lb 11 oz)   03/29/25 1507 59 kg (130 lb)     Diuretics (From admission, onward)      None          Cardiac/Autonomic (From admission, onward)      Start     Stop Route Frequency Ordered    03/30/25 0900  amLODIPine tablet 10 mg         -- Oral Daily 03/29/25 2039 03/30/25 0900  metoprolol succinate (TOPROL-XL) 24 hr tablet 50 mg         -- Oral Daily 03/29/25 2039 03/29/25 2145  sacubitriL-valsartan 24-26 mg per tablet 1 tablet         -- Oral 2 times daily 03/29/25 2039 03/29/25 2133  nitroGLYCERIN SL tablet 0.4 mg         -- SL Every 5 min PRN 03/29/25 2039

## 2025-03-31 NOTE — HPI
"From H&P: "  74-year-old male with history of hypertension, diabetes, cardiomyopathy and pulmonary fibrosis requiring 3 L by nasal cannula at baseline on Ofev, MMF and bactrim prophylaxis who presents with intermittent chest pressure over the past weeks for which a stress test occurred and demonstrated a fixed defect who presents with 2 days of worsening shortness of breath in addition to his newly developing abdominal discomfort.     As stated, for the past weeks the patient has been having intermittent chest discomfort.  Over the past days however he has had shortness of breath which seems to have worsened with exertion.  No fevers.  No new or worsening lower extremity edema.  No new productive cough.     Regarding his abdominal discomfort, it is diffuse.  He has had reduced oral intake but has continued to have bowel movements.  At the time of my interview it has essentially resolved and he is asking for food."    Palliative medicine consulted for goals of care discussion and advance care planning; for details of visit, see advance care planning section of plan.       "

## 2025-03-31 NOTE — ASSESSMENT & PLAN NOTE
3/31/2025  - consult received; interval chart reviewed in detail  - met with patient at bedside; introduction to palliative medicine team and role in current care and admission   - pt sitting in chair in hallway outside of room; offered to move discussion/visit into room for privacy, pt preferred to stay in benitez as he wished to go for a walk after visit and didn't like being isolated in his room   - learned more about pt outside of current admission; pt lives alone in [place] and feels that he is able to safely perform ADLs and proudly shares that he still cooks; he does share some struggles with house chores due to stamina and [place]   - pt did inquire about insurance coverage for care assistance for home care needs, house keeping etc; encouraged to discuss with CM/SW, will share with CM as well   - pt has 3 sons who all live out of state ([place]); his brother Jorgito lives locally and assists as needed   - GOC/ACP discussion  - pt identifies his brother Jorgito as preferred MPOA, mainly die to proximity and availability; discussed legal surrogate decision maker hierarchy and option for completion of MPOA documentation, which he is agreeable to; will [facility] with [facility]   - reviewed wishes regarding code status; reviewed potential interventions, risks, and outcomes of full code status and alternative of DNR  - pt verbalized understanding of full code status currently; will require further discussion regarding specifics of wishes especially in relation to intubation as pt did wish for CPR, but was expressing potentially not wishing for intubation  - pt was very eager to switch to rolling O2 tank and to work on his walking, so we made plans to further discuss at next visit   - emotional support provided   - Allowed time for questions/concerns; all addressed; expressed availability of myself/palliative team for additional questions/concerns

## 2025-03-31 NOTE — PLAN OF CARE
03/31/25 1420   Discharge Planning   Assessment Type Discharge Planning Brief Assessment   Resource/Environmental Concerns none   Support Systems Children   Equipment Currently Used at Home oxygen   Current Living Arrangements home   Patient/Family Anticipates Transition to home   Patient/Family Anticipated Services at Transition none   DME Needed Upon Discharge  none   Discharge Plan A Home  (with instructions to follow up)       [id number] [address] AT [address] [address]  Phone: [phone] Fax: [phone]    [facility] [address]  [address]   [facility] [address]  Phone: [phone] Fax: [phone]

## 2025-03-31 NOTE — CONSULTS
Thank you for your consult to [facility]. We have reviewed the patient chart. This patient does meet criteria for [facility] service at this time.  Will assume care on 03/31/25 at 6AM.

## 2025-03-31 NOTE — ASSESSMENT & PLAN NOTE
Patient's blood pressure range in the last 24 hours was: BP  Min: 103/63  Max: 145/76.The patient's inpatient anti-hypertensive regimen is listed below:  Current Antihypertensives  amLODIPine tablet 10 mg, Daily, Oral  metoprolol succinate (TOPROL-XL) 24 hr tablet 50 mg, Daily, Oral  nitroGLYCERIN SL tablet 0.4 mg, Every 5 min PRN, Sublingual    Plan  - BP is controlled, no changes needed to their regimen  -

## 2025-03-31 NOTE — ASSESSMENT & PLAN NOTE
Patient with known CAD   Currently denies CP  Monitor for S/Sx of angina/ACS.   Maintain K > 4, Mag > 2 and Ca WNL to decrease arrhythmogenic potential  Continue to monitor on telemetry.   Will continue ASA and Statin  Antiplatelets:   aspirin - 81 MG  aspirin Chew - 81 MG  Statin:  rosuvastatin - 20 MG

## 2025-04-01 VITALS
HEIGHT: 68 IN | TEMPERATURE: 97 F | OXYGEN SATURATION: 98 % | SYSTOLIC BLOOD PRESSURE: 135 MMHG | WEIGHT: 136.69 LBS | RESPIRATION RATE: 18 BRPM | HEART RATE: 52 BPM | BODY MASS INDEX: 20.72 KG/M2 | DIASTOLIC BLOOD PRESSURE: 84 MMHG

## 2025-04-01 LAB
ABSOLUTE EOSINOPHIL (OHS): 0.26 K/UL
ABSOLUTE MONOCYTE (OHS): 0.76 K/UL (ref 0.3–1)
ABSOLUTE NEUTROPHIL COUNT (OHS): 6.87 K/UL (ref 1.8–7.7)
ALBUMIN SERPL BCP-MCNC: 3.6 G/DL (ref 3.5–5.2)
ALP SERPL-CCNC: 62 UNIT/L (ref 40–150)
ALT SERPL W/O P-5'-P-CCNC: 29 UNIT/L (ref 10–44)
ANION GAP (OHS): 11 MMOL/L (ref 8–16)
AST SERPL-CCNC: 15 UNIT/L (ref 11–45)
BASOPHILS # BLD AUTO: 0.01 K/UL
BASOPHILS NFR BLD AUTO: 0.1 %
BILIRUB SERPL-MCNC: 0.5 MG/DL (ref 0.1–1)
BUN SERPL-MCNC: 34 MG/DL (ref 8–23)
CALCIUM SERPL-MCNC: 9.7 MG/DL (ref 8.7–10.5)
CHLORIDE SERPL-SCNC: 102 MMOL/L (ref 95–110)
CO2 SERPL-SCNC: 24 MMOL/L (ref 23–29)
CREAT SERPL-MCNC: 1.1 MG/DL (ref 0.5–1.4)
ERYTHROCYTE [DISTWIDTH] IN BLOOD BY AUTOMATED COUNT: 14.7 % (ref 11.5–14.5)
GFR SERPLBLD CREATININE-BSD FMLA CKD-EPI: >60 ML/MIN/1.73/M2
GLUCOSE SERPL-MCNC: 137 MG/DL (ref 70–110)
HCT VFR BLD AUTO: 44.4 % (ref 40–54)
HGB BLD-MCNC: 14.5 GM/DL (ref 14–18)
IMM GRANULOCYTES # BLD AUTO: 0.03 K/UL (ref 0–0.04)
IMM GRANULOCYTES NFR BLD AUTO: 0.3 % (ref 0–0.5)
LYMPHOCYTES # BLD AUTO: 2 K/UL (ref 1–4.8)
MAGNESIUM SERPL-MCNC: 2.2 MG/DL (ref 1.6–2.6)
MCH RBC QN AUTO: 27.6 PG (ref 27–31)
MCHC RBC AUTO-ENTMCNC: 32.7 G/DL (ref 32–36)
MCV RBC AUTO: 85 FL (ref 82–98)
NUCLEATED RBC (/100WBC) (OHS): 0 /100 WBC
OHS QRS DURATION: 96 MS
OHS QTC CALCULATION: 419 MS
PLATELET # BLD AUTO: 388 K/UL (ref 150–450)
PMV BLD AUTO: 10.1 FL (ref 9.2–12.9)
POCT GLUCOSE: 123 MG/DL (ref 70–110)
POTASSIUM SERPL-SCNC: 4.6 MMOL/L (ref 3.5–5.1)
PROT SERPL-MCNC: 7.3 GM/DL (ref 6–8.4)
RBC # BLD AUTO: 5.25 M/UL (ref 4.6–6.2)
RELATIVE EOSINOPHIL (OHS): 2.6 %
RELATIVE LYMPHOCYTE (OHS): 20.1 % (ref 18–48)
RELATIVE MONOCYTE (OHS): 7.7 % (ref 4–15)
RELATIVE NEUTROPHIL (OHS): 69.2 % (ref 38–73)
SODIUM SERPL-SCNC: 137 MMOL/L (ref 136–145)
WBC # BLD AUTO: 9.93 K/UL (ref 3.9–12.7)

## 2025-04-01 PROCEDURE — 25000003 PHARM REV CODE 250: Mod: HCNC | Performed by: INTERNAL MEDICINE

## 2025-04-01 PROCEDURE — 80053 COMPREHEN METABOLIC PANEL: CPT | Mod: HCNC | Performed by: INTERNAL MEDICINE

## 2025-04-01 PROCEDURE — 85025 COMPLETE CBC W/AUTO DIFF WBC: CPT | Mod: HCNC | Performed by: INTERNAL MEDICINE

## 2025-04-01 PROCEDURE — 25000242 PHARM REV CODE 250 ALT 637 W/ HCPCS: Mod: HCNC | Performed by: INTERNAL MEDICINE

## 2025-04-01 PROCEDURE — G0378 HOSPITAL OBSERVATION PER HR: HCPCS | Mod: HCNC

## 2025-04-01 PROCEDURE — 94761 N-INVAS EAR/PLS OXIMETRY MLT: CPT | Mod: HCNC

## 2025-04-01 PROCEDURE — 27000221 HC OXYGEN, UP TO 24 HOURS: Mod: HCNC

## 2025-04-01 PROCEDURE — 63600175 PHARM REV CODE 636 W HCPCS: Mod: HCNC | Performed by: INTERNAL MEDICINE

## 2025-04-01 PROCEDURE — 83735 ASSAY OF MAGNESIUM: CPT | Mod: HCNC | Performed by: INTERNAL MEDICINE

## 2025-04-01 PROCEDURE — 36415 COLL VENOUS BLD VENIPUNCTURE: CPT | Mod: HCNC | Performed by: INTERNAL MEDICINE

## 2025-04-01 PROCEDURE — 94640 AIRWAY INHALATION TREATMENT: CPT | Mod: HCNC,XB

## 2025-04-01 PROCEDURE — 25000003 PHARM REV CODE 250: Mod: HCNC | Performed by: HOSPITALIST

## 2025-04-01 RX ADMIN — AMLODIPINE BESYLATE 10 MG: 5 TABLET ORAL at 09:04

## 2025-04-01 RX ADMIN — ASPIRIN 81 MG CHEWABLE TABLET 81 MG: 81 TABLET CHEWABLE at 09:04

## 2025-04-01 RX ADMIN — OXYBUTYNIN CHLORIDE 5 MG: 5 TABLET, EXTENDED RELEASE ORAL at 09:04

## 2025-04-01 RX ADMIN — SULFAMETHOXAZOLE AND TRIMETHOPRIM 1 TABLET: 800; 160 TABLET ORAL at 09:04

## 2025-04-01 RX ADMIN — MYCOPHENOLATE MOFETIL 1000 MG: 250 CAPSULE ORAL at 09:04

## 2025-04-01 RX ADMIN — BUDESONIDE 1 MG: 0.5 INHALANT RESPIRATORY (INHALATION) at 07:04

## 2025-04-01 RX ADMIN — PANTOPRAZOLE SODIUM 40 MG: 40 TABLET, DELAYED RELEASE ORAL at 09:04

## 2025-04-01 RX ADMIN — LACTULOSE 15 G: 20 SOLUTION ORAL at 09:04

## 2025-04-01 RX ADMIN — ARFORMOTEROL TARTRATE 15 MCG: 15 SOLUTION RESPIRATORY (INHALATION) at 07:04

## 2025-04-01 RX ADMIN — SUCRALFATE 1 G: 1 TABLET ORAL at 05:04

## 2025-04-01 RX ADMIN — PREDNISONE 20 MG: 20 TABLET ORAL at 09:04

## 2025-04-01 RX ADMIN — DIPHENHYDRAMINE HYDROCHLORIDE 25 MG: 25 CAPSULE ORAL at 04:04

## 2025-04-01 RX ADMIN — SACUBITRIL AND VALSARTAN 1 TABLET: 24; 26 TABLET, FILM COATED ORAL at 09:04

## 2025-04-01 NOTE — NURSING
Scheduled medication given; Entresto held /74 HR 88, per MD. Pt sitting in recliner no complaints. IV saline lock. Accu check with insulin coverage. Pt on 2L NC, no distress noted. Safety measures maintained. Will cont to monitor

## 2025-04-01 NOTE — ASSESSMENT & PLAN NOTE
Symptoms likely attributed to progressively worsening ILD.   Continue continue Mycophenolate and Ofev.    He is currently on a prednisone taper which has been continued  On Bactrim prophylaxis.    Patient instructed as family to bring in Ofev as not on formulary  Pulmonology consulted and following: Patient remains on baseline O2 needs and no increased respiratory symptoms reported from his baseline. Can continue home medications and follow up in clinic.   Feeling at baseline and ready for discharge  Palliative care consulted

## 2025-04-01 NOTE — PLAN OF CARE
Problem: Adult Inpatient Plan of Care  Goal: Plan of Care Review  Outcome: Adequate for Care Transition  Goal: Patient-Specific Goal (Individualized)  Outcome: Adequate for Care Transition  Goal: Absence of Hospital-Acquired Illness or Injury  Outcome: Adequate for Care Transition  Goal: Optimal Comfort and Wellbeing  Outcome: Adequate for Care Transition  Goal: Readiness for Transition of Care  Outcome: Adequate for Care Transition     Problem: Coping Ineffective  Goal: Effective Coping  Outcome: Adequate for Care Transition     Problem: Diabetes Comorbidity  Goal: Blood Glucose Level Within Targeted Range  Outcome: Adequate for Care Transition

## 2025-04-01 NOTE — NURSING
"Pt anxious walked in and out room all shift to and from nurses desk and up and down the benitez pt had frequent request, pt states "I would like to see and GI  In the morning"  "

## 2025-04-01 NOTE — ASSESSMENT & PLAN NOTE
Patient's blood pressure range in the last 24 hours was: BP  Min: 109/80  Max: 135/84.The patient's inpatient anti-hypertensive regimen is listed below:  Current Antihypertensives  amLODIPine tablet 10 mg, Daily, Oral  metoprolol succinate (TOPROL-XL) 24 hr tablet 50 mg, Daily, Oral  nitroGLYCERIN SL tablet 0.4 mg, Every 5 min PRN, Sublingual    Plan  - BP is controlled, no changes needed to their regimen  -

## 2025-04-01 NOTE — NURSING
Pt discharged per MD order. IV removed. Catheter tip intact. No distress noted.  VSS. Afebrile. No complaints of pain, N/V, diarrhea, or SOB. Pt left with belongings to Main Entrance via wheelchair per CNA. Personal oxygen in use.

## 2025-04-01 NOTE — PLAN OF CARE
04/01/25 0901   Final Note   Assessment Type Final Discharge Note   Anticipated Discharge Disposition Home-Health   Hospital Resources/Appts/Education Provided Appointments scheduled and added to AVS   Post-Acute Status   Post-Acute Authorization Home Health   Home Health Status Pending Clinical Review     Pts nurse Carolina/Amy notified that the pt can d/c from CM standpoint

## 2025-04-01 NOTE — DISCHARGE SUMMARY
[facility] Medicine  Discharge Summary      Patient Name: Emmanuel Grant  MRN: [id number]  Patient Class: OP- Observation  Admission Date: 3/29/2025  Hospital Length of Stay: 0 days  Discharge Date and Time: 04/01/2025 8:38 AM  Attending Physician: Roel Pelaez MD   Discharging Provider: Roel Lehman MD  Primary Care Provider: Wilfredo De Souza MD      HPI:   74-year-old male with history of hypertension, diabetes, cardiomyopathy and pulmonary fibrosis requiring 3 L by nasal cannula at baseline on Ofev, MMF and bactrim prophylaxis who presents with intermittent chest pressure over the past weeks for which a stress test occurred and demonstrated a fixed defect who presents with 2 days of worsening shortness of breath in addition to his newly developing abdominal discomfort.    As stated, for the past weeks the patient has been having intermittent chest discomfort.  Over the past days however he has had shortness of breath which seems to have worsened with exertion.  No fevers.  No new or worsening lower extremity edema.  No new productive cough.    Regarding his abdominal discomfort, it is diffuse.  He has had reduced oral intake but has continued to have bowel movements.  At the time of my interview it has essentially resolved and he is asking for food.        * No surgery found *      Hospital Course:   73 yo male with history of pulmonary fibrosis on Nintedanib and mmf and chronic hypoxic respiratory failure on home o2 at 3 LPM. Pulmonary was consulted for history of pulmonary fibrosis. Reports low - suspicion of suspect acute exacerbation of ILD at this time. CTA chest performed and negative for pulmonary embolism, but reveals known fibrotic changes. Recommendations to continue Mycophenolate, bactrim ppx, and steroid wean at this time.     A Palliative care consult was placed given several recent hospitalizations for similar symptoms within in the past year - 3/23-3/25; 3/16-3/27; 1/16-/120;  12/12-12/16.         Goals of Care Treatment Preferences:  Code Status: Full Code          What is most important right now is to focus on spending time at home, avoiding the hospital, remaining as independent as possible, symptom/pain control, quality of life, even if it means sacrificing a little time.  Accordingly, we have decided that the best plan to meet the patient's goals includes continuing with treatment.      Consults:   Consults (From admission, onward)          Status Ordering Provider     Inpatient virtual consult to Hospital Medicine  Once        Provider:  Tiffanie Pelaez MD    Completed DIMA PATEL     Inpatient consult to Pulmonology  Once        Provider:  J Carlos Barriga MD    Completed DIMA PATEL     Inpatient consult to Palliative Care  Once        Provider:  Janis Glaser NP    Completed DIMA PATEL     Inpatient consult to Social Work/Case Management  Once        Provider:  (Not yet assigned)    Acknowledged FLORENCE CARPENTER     Inpatient consult to Registered Dietitian/Nutritionist  Once        Provider:  (Not yet assigned)    Completed FLORENCE CARPENTER            Assessment & Plan  IPF (idiopathic pulmonary fibrosis)  Symptoms likely attributed to progressively worsening ILD.   Continue continue Mycophenolate and Ofev.    He is currently on a prednisone taper which has been continued  On Bactrim prophylaxis.    Patient instructed as family to bring in Ofev as not on formulary  Pulmonology consulted and following: Patient remains on baseline O2 needs and no increased respiratory symptoms reported from his baseline. Can continue home medications and follow up in clinic.   Feeling at baseline and ready for discharge  Palliative care consulted     Type 2 diabetes mellitus without complication, without long-term current use of insulin  Lab Results   Component Value Date    HGBA1C 7.8 (H) 01/02/2025     POCT Glucose   Date Value Ref Range Status   04/01/2025 123 (H) 70 - 110  mg/dL Final   03/31/2025 279 (H) 70 - 110 mg/dL Final   03/31/2025 433 (H) 70 - 110 mg/dL Final   03/31/2025 460 (HH) 70 - 110 mg/dL Final   03/31/2025 253 (H) 70 - 110 mg/dL Final   03/31/2025 197 (H) 70 - 110 mg/dL Final   03/30/2025 453 (HH) 70 - 110 mg/dL Final   03/30/2025 413 (H) 70 - 110 mg/dL Final   03/30/2025 311 (H) 70 - 110 mg/dL Final   03/30/2025 347 (H) 70 - 110 mg/dL Final   03/30/2025 156 (H) 70 - 110 mg/dL Final   03/29/2025 171 (H) 70 - 110 mg/dL Final     Low dose correction scale   Cont blood glucose monitoring   BG goal:  Preprandial blood glucose target <140 mg/dL  Random glucoses <180 mg/dL  Avoid hypoglycemia -  Reduce antihyperglycemic therapy when caloric intake is reduced. Avoid insulin stacking as a result of repeated injection of prandial insulin at close intervals.   Avoid severe hyperglycemia  ADA diet  Antihyperglycemics (From admission, onward)      Start     Stop Route Frequency Ordered    03/29/25 2138  insulin aspart U-100 pen 0-5 Units         -- SubQ Before meals & nightly PRN 03/29/25 2039           Coronary artery disease involving native coronary artery of native heart with angina pectoris  Patient with known CAD   Currently denies CP  Monitor for S/Sx of angina/ACS.   Maintain K > 4, Mag > 2 and Ca WNL to decrease arrhythmogenic potential  Continue to monitor on telemetry.   Will continue ASA and Statin  Antiplatelets:   aspirin - 81 MG  aspirin Chew - 81 MG  Statin:  rosuvastatin - 20 MG   GERD (gastroesophageal reflux disease)  Continue PPI  Essential hypertension  Patient's blood pressure range in the last 24 hours was: BP  Min: 109/80  Max: 135/84.The patient's inpatient anti-hypertensive regimen is listed below:  Current Antihypertensives  amLODIPine tablet 10 mg, Daily, Oral  metoprolol succinate (TOPROL-XL) 24 hr tablet 50 mg, Daily, Oral  nitroGLYCERIN SL tablet 0.4 mg, Every 5 min PRN, Sublingual    Plan  - BP is controlled, no changes needed to their regimen  -    Chest pain  Troponin trend negative. EKG without acute changes.   Recent stress test with severe intensity, fixed perfusion abnormality consistent with scar in the anteroapical wall.     Immunosuppression due to drug therapy  Cont home meds  On daily Bactrim for oppurtunistic infections.   Chronic hypoxemic respiratory failure  Patient with Hypoxic Respiratory failure which is Chronic.  he is on home oxygen at 3 LPM. Supplemental oxygen was provided and noted-      .     Chronic diastolic CHF (congestive heart failure)  Results for orders placed during the hospital encounter of 03/16/25    Echo    Interpretation Summary    Left Ventricle: The left ventricle is normal in size. There is mildly reduced systolic function with a visually estimated ejection fraction of 40 - 45%.    Right Ventricle: The right ventricle is normal in size. Systolic function is normal.    Aortic Valve: There is mild aortic valve sclerosis.    Mitral Valve: There is moderate mitral annular calcification.    Aorta: Aortic root is mildly dilated measuring 3.73 cm.    Pulmonary Artery: The estimated pulmonary artery systolic pressure is 30 mmHg.    IVC/SVC: Normal venous pressure at 3 mmHg.    Recent Labs   Lab 03/29/25  1631   *     Does not seem volume overloaded.   Cont to monitor I/O's and daily weights.  Fluid restriction (2 liters/24 hours)  Low Na diet  Monitor for signs of fluid overload: RR>30, O2 sat<92%, weight gain of >3 lbs, or urinary output <160ml/8hr  Maintain oxygen sats >92% via NC     Patient Vitals for the past 72 hrs (Last 3 readings):   Weight   03/30/25 1630 62 kg (136 lb 11 oz)   03/29/25 1507 59 kg (130 lb)     Diuretics (From admission, onward)      None          Cardiac/Autonomic (From admission, onward)      Start     Stop Route Frequency Ordered    03/30/25 0900  amLODIPine tablet 10 mg         -- Oral Daily 03/29/25 2039 03/30/25 0900  metoprolol succinate (TOPROL-XL) 24 hr tablet 50 mg         -- Oral  Daily 03/29/25 2039 03/29/25 2145  sacubitriL-valsartan 24-26 mg per tablet 1 tablet         -- Oral 2 times daily 03/29/25 2039 03/29/25 2133  nitroGLYCERIN SL tablet 0.4 mg         -- SL Every 5 min PRN 03/29/25 2039          ACP (advance care planning)      Final Active Diagnoses:    Diagnosis Date Noted POA    PRINCIPAL PROBLEM:  IPF (idiopathic pulmonary fibrosis) [J84.112] 02/18/2025 Yes    Chronic hypoxemic respiratory failure [J96.11] 12/10/2024 Yes    Chronic diastolic CHF (congestive heart failure) [I50.32] 03/31/2025 Yes    ACP (advance care planning) [Z71.89] 03/31/2025 Not Applicable    Immunosuppression due to drug therapy [D84.821, Z79.899] 10/10/2024 Not Applicable    Chest pain [R07.9] 06/16/2024 Yes    GERD (gastroesophageal reflux disease) [K21.9] 04/20/2024 Yes    Coronary artery disease involving native coronary artery of native heart with angina pectoris [I25.119] 02/28/2024 Yes    Type 2 diabetes mellitus without complication, without long-term current use of insulin [E11.9] 10/08/2021 Yes    Essential hypertension [I10] 05/29/2020 Yes      Problems Resolved During this Admission:       Discharged Condition: fair    Disposition: Home or Self Care    Follow Up:    Patient Instructions:      Ambulatory referral/consult to HOME Palliative Care   Standing Status: Future   Referral Priority: Routine Referral Type: Consultation   Requested Specialty: Palliative Medicine   Number of Visits Requested: 1     Diet Adult Regular     Notify your health care provider if you experience any of the following:  difficulty breathing or increased cough     Activity as tolerated       Significant Diagnostic Studies: Labs: CMP   Recent Labs   Lab 03/31/25  0451 04/01/25  0614    137   K 4.3 4.6    102   CO2 21* 24   BUN 28* 34*   CREATININE 1.0 1.1   CALCIUM 9.9 9.7   ALBUMIN 3.6 3.6   BILITOT 0.4 0.5   ALKPHOS 69 62   AST 14 15   ALT 37 29   ANIONGAP 13 11    and CBC   Recent Labs   Lab  "03/31/25  0451 04/01/25  0614   WBC 10.04 9.93   HGB 14.9 14.5   HCT 46.1 44.4    388       Pending Diagnostic Studies:       None           Medications:  Reconciled Home Medications:      Medication List        CONTINUE taking these medications      albuterol-ipratropium 2.5 mg-0.5 mg/3 mL nebulizer solution  Commonly known as: DUO-NEB  Take 3 mLs by nebulization every 4 (four) hours. Rescue     amLODIPine 10 MG tablet  Commonly known as: NORVASC  Take 1 tablet (10 mg total) by mouth once daily.     arformoteroL 15 mcg/2 mL Nebu  Commonly known as: BROVANA  Take 2 mLs (15 mcg total) by nebulization 2 (two) times daily. Controller     aspirin 81 MG Chew  Take 81 mg by mouth once daily.     BD ULTRA-FINE HEATHER PEN NEEDLE 32 gauge x 5/32" Ndle  Generic drug: pen needle, diabetic  1 each by Misc.(Non-Drug; Combo Route) route once daily.     benzonatate 200 MG capsule  Commonly known as: TESSALON  Take 1 capsule (200 mg total) by mouth 3 (three) times daily as needed.     budesonide 0.5 mg/2 mL nebulizer solution  Commonly known as: PULMICORT  Take 4 mLs (1 mg total) by nebulization 2 (two) times daily. Controller     cholecalciferol (vitamin D3) 50 mcg (2,000 unit) Cap capsule  Commonly known as: VITAMIN D3  Take 1 capsule by mouth once daily.     dextromethorphan-guaiFENesin  mg/5 ml  mg/5 mL liquid  Commonly known as: ROBITUSSIN-DM  Take 10 mLs by mouth every 4 (four) hours as needed (cough).     ENTRESTO 24-26 mg per tablet  Generic drug: sacubitriL-valsartan  Take 1 tablet by mouth 2 (two) times daily.     famotidine 20 MG tablet  Commonly known as: PEPCID  Take 1 tablet (20 mg total) by mouth 2 (two) times daily as needed for Heartburn.     furosemide 20 MG tablet  Commonly known as: LASIX  Take 1 tablet (20 mg total) by mouth once daily.     glimepiride 4 MG tablet  Commonly known as: AMARYL  Take 1 tablet (4 mg total) by mouth before breakfast.     insulin aspart U-100 100 unit/mL (3 mL) Inpn " pen  Commonly known as: NovoLOG Flexpen U-100 Insulin  Inject as needed before meals: 180-230=+1, 231-280=+2, 281-330=+3, 331-380=+4, over 380=+5 units     latanoprost (PF) 0.005 % Drop  1 drop into affected eye in the evening Ophthalmic Once a day     metFORMIN 500 MG ER 24hr tablet  Commonly known as: GLUCOPHAGE-XR  Take 1 tablet (500 mg total) by mouth 2 (two) times daily with meals.     metoprolol succinate 50 MG 24 hr tablet  Commonly known as: TOPROL-XL  Take 1 tablet (50 mg total) by mouth once daily.     mirabegron 25 mg Tb24 ER tablet  Commonly known as: MYRBETRIQ  Take 1 tablet (25 mg total) by mouth once daily.     mycophenolate 250 mg Cap  Commonly known as: CELLCEPT  Take 4 capsules (1,000 mg total) by mouth 2 (two) times daily.     nitroGLYCERIN 0.3 MG SL tablet  Commonly known as: NITROSTAT  Place 1 tablet (0.3 mg total) under the tongue every 5 (five) minutes as needed for Chest pain.     OFEV 150 mg Cap  Generic drug: nintedanib  Take 1 capsule (150 mg total) by mouth 2 (two) times a day.     omeprazole 40 MG capsule  Commonly known as: PRILOSEC  Take 1 capsule (40 mg total) by mouth once daily.     ONETOUCH DELICA PLUS LANCET 33 gauge Misc  Generic drug: lancets  Apply topically 3 (three) times daily.     predniSONE 10 MG tablet  Commonly known as: DELTASONE  Take 4 tablets (40 mg total) by mouth once daily for 5 days, THEN 2 tablets (20 mg total) once daily for 5 days, THEN 1 tablet (10 mg total) once daily for 5 days, THEN 0.5 tablets (5 mg total) once daily for 5 days. Take 40 mg for 5 days, 20 mg for 5 days and 10 mg for 5 day.  Start taking on: March 25, 2025     rosuvastatin 20 MG tablet  Commonly known as: CRESTOR  Take 1 tablet (20 mg total) by mouth once daily.     simethicone 80 MG chewable tablet  Commonly known as: MYLICON  Take 1 tablet (80 mg total) by mouth 3 (three) times daily as needed for Flatulence.     sucralfate 1 gram tablet  Commonly known as: CARAFATE  Take 1 tablet (1 g  total) by mouth 4 (four) times daily before meals and nightly.     sulfamethoxazole-trimethoprim 800-160mg 800-160 mg Tab  Commonly known as: BACTRIM DS  Take 1 tablet by mouth once daily.            STOP taking these medications      guaiFENesin 600 mg 12 hr tablet  Commonly known as: MUCINEX              Indwelling Lines/Drains at time of discharge:   Lines/Drains/Airways       None                   Time spent on the discharge of patient: 40 minutes         The attending portion of this evaluation, treatment, and documentation was performed per Roel Lehman MD via Telemedicine AudioVisual using the secure Gip[employer] software platform with 2 way audio/video. The provider was located off-site and the patient is located in the hospital. The aforementioned video software was utilized to document the relevant history and physical exam    Roel Lehman MD  Department of Hospital Medicine  [facility]

## 2025-04-01 NOTE — NURSING
Pt resting in bed asleep. No distress noted. No complaints. Safety measures maintained. Will cont to monitor

## 2025-04-01 NOTE — NURSING
[facility] [place]  Nurses Note -- 4 Eyes      4/1/2025       Skin assessed on: Q Shift      [x] No Pressure Injuries Present    [x]Prevention Measures Documented    [] Yes LDA  for Pressure Injury Previously documented     [] Yes New Pressure Injury Discovered   [] LDA for New Pressure Injury Added      Attending RN:  Carolina Knox RN     Second RN:  GARY Reyes

## 2025-04-01 NOTE — ASSESSMENT & PLAN NOTE
Lab Results   Component Value Date    HGBA1C 7.8 (H) 01/02/2025     POCT Glucose   Date Value Ref Range Status   04/01/2025 123 (H) 70 - 110 mg/dL Final   03/31/2025 279 (H) 70 - 110 mg/dL Final   03/31/2025 433 (H) 70 - 110 mg/dL Final   03/31/2025 460 (HH) 70 - 110 mg/dL Final   03/31/2025 253 (H) 70 - 110 mg/dL Final   03/31/2025 197 (H) 70 - 110 mg/dL Final   03/30/2025 453 (HH) 70 - 110 mg/dL Final   03/30/2025 413 (H) 70 - 110 mg/dL Final   03/30/2025 311 (H) 70 - 110 mg/dL Final   03/30/2025 347 (H) 70 - 110 mg/dL Final   03/30/2025 156 (H) 70 - 110 mg/dL Final   03/29/2025 171 (H) 70 - 110 mg/dL Final     Low dose correction scale   Cont blood glucose monitoring   BG goal:  Preprandial blood glucose target <140 mg/dL  Random glucoses <180 mg/dL  Avoid hypoglycemia -  Reduce antihyperglycemic therapy when caloric intake is reduced. Avoid insulin stacking as a result of repeated injection of prandial insulin at close intervals.   Avoid severe hyperglycemia  ADA diet  Antihyperglycemics (From admission, onward)      Start     Stop Route Frequency Ordered    03/29/25 2138  insulin aspart U-100 pen 0-5 Units         -- SubQ Before meals & nightly PRN 03/29/25 2039

## 2025-04-01 NOTE — PLAN OF CARE
[place] [facility]    HOME HEALTH ORDERS  FACE TO FACE ENCOUNTER    Patient Name: Emmanuel Grant  YOB: 1950    PCP: Wilfredo De Souza MD   PCP Address: [address]  PCP Phone Number: [phone]  PCP Fax: [phone]       Encounter Date: 04/01/2025    Admit to Home Health    Diagnoses:  Active Hospital Problems    Diagnosis  POA    *IPF (idiopathic pulmonary fibrosis) [J84.112]  Yes     Priority: 1 - High    Chronic hypoxemic respiratory failure [J96.11]  Yes     Priority: 2     Chronic diastolic CHF (congestive heart failure) [I50.32]  Yes    ACP (advance care planning) [Z71.89]  Not Applicable    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       Future Appointments   Date Time Provider Department Center   4/7/2025  8:35 AM LAB, [facility] LAB [facility]   4/7/2025  9:00 AM Joyce Morocho, [facility]   4/9/2025 12:30 PM Malika Calderon NP [facility]   4/9/2025  2:00 PM Karyn Hagen MD [facility] PAL MED [facility]   4/17/2025  1:30 PM Samira Palma MD [facility] OPHTHAL [address]   4/21/2025 11:00 AM Naheed Schmitt MD [facility] PULMSVC [facility]   4/28/2025 10:20 AM Wilfredo De Souza MD [facility] IM [facility]   5/1/2025  9:40 AM Brandon Ventura MD [facility] CARDIO [facility]   5/6/2025 10:15 AM Michael Parham MD [facility] OPHTHAL [address]   6/9/2025  3:00 PM Wilfredo De Souza MD [facility]           I have seen and examined this patient face to face today. My clinical findings that  "support the need for the home health skilled services and home bound status are the following:  Weakness/numbness causing balance and gait disturbance due to Heart Failure and COPD Exacerbation making it taxing to leave home.    Allergies:  Review of patient's allergies indicates:   Allergen Reactions    Dapagliflozin      Other reaction(s): Other (See Comments)    Pcn [penicillins]     Linagliptin Other (See Comments)     "it knocked me down", "it almost killed me"    Lisinopril Other (See Comments)     cough    Pantoprazole Hives       Diet: regular diet    Activities: activity as tolerated    Nursing:   SN to complete comprehensive assessment including routine vital signs. Instruct on disease process and s/s of complications to report to MD. Review/verify medication list sent home with the patient at time of discharge  and instruct patient/caregiver as needed. Frequency may be adjusted depending on start of care date.    Notify MD if SBP > 160 or < 90; DBP > 90 or < 50; HR > 120 or < 50; Temp > 101; Other:         CONSULTS:     to evaluate for community resources/long-range planning.  Aide to provide assistance with personal care, ADLs, and vital signs.    MISCELLANEOUS CARE:  Routine Skin for Bedridden Patients: Instruct patient/caregiver to apply moisture barrier cream to all skin folds and wet areas in perineal area daily and after baths and all bowel movements.  Home Oxygen:  Oxygen at 2 L/min nasal canula to be used:  Continuously., Assess oxygen saturation via pulse oximeter as needed for increase in SOB., Notify physician if oxygen saturation less than 88%, and Consult respiratory therapy for instruction on home oxygen use    WOUND CARE ORDERS  no      Medications: Review discharge medications with patient and family and provide education.      Current Discharge Medication List        CONTINUE these medications which have NOT CHANGED    Details   albuterol-ipratropium (DUO-NEB) 2.5 mg-0.5 mg/3 " mL nebulizer solution Take 3 mLs by nebulization every 4 (four) hours. Rescue    Associated Diagnoses: Chronic hypoxemic respiratory failure; Interstitial lung disease      amLODIPine (NORVASC) 10 MG tablet Take 1 tablet (10 mg total) by mouth once daily.  Qty: 90 tablet, Refills: 3    Comments: .  Associated Diagnoses: Essential hypertension      arformoteroL (BROVANA) 15 mcg/2 mL Nebu Take 2 mLs (15 mcg total) by nebulization 2 (two) times daily. Controller  Qty: 120 mL, Refills: 11      aspirin 81 MG Chew Take 81 mg by mouth once daily.      benzonatate (TESSALON) 200 MG capsule Take 1 capsule (200 mg total) by mouth 3 (three) times daily as needed.  Qty: 30 capsule, Refills: 0    Associated Diagnoses: Subacute cough      budesonide (PULMICORT) 0.5 mg/2 mL nebulizer solution Take 4 mLs (1 mg total) by nebulization 2 (two) times daily. Controller  Qty: 240 mL, Refills: 11      cholecalciferol, vitamin D3, (VITAMIN D3) 50 mcg (2,000 unit) Cap capsule Take 1 capsule by mouth once daily.      dextromethorphan-guaiFENesin  mg/5 ml (ROBITUSSIN-DM)  mg/5 mL liquid Take 10 mLs by mouth every 4 (four) hours as needed (cough).  Qty: 236 mL, Refills: 0      famotidine (PEPCID) 20 MG tablet Take 1 tablet (20 mg total) by mouth 2 (two) times daily as needed for Heartburn.  Qty: 60 tablet, Refills: 1    Associated Diagnoses: Gastroesophageal reflux disease without esophagitis      furosemide (LASIX) 20 MG tablet Take 1 tablet (20 mg total) by mouth once daily.  Qty: 90 tablet, Refills: 3      glimepiride (AMARYL) 4 MG tablet Take 1 tablet (4 mg total) by mouth before breakfast.  Qty: 90 tablet, Refills: 2      insulin aspart U-100 (NOVOLOG FLEXPEN U-100 INSULIN) 100 unit/mL (3 mL) InPn pen Inject as needed before meals: 180-230=+1, 231-280=+2, 281-330=+3, 331-380=+4, over 380=+5 units  Qty: 15 mL, Refills: 0    Comments: Max daily dose 15 units      latanoprost, PF, 0.005 % Drop 1 drop into affected eye in the  "evening Ophthalmic Once a day      metFORMIN (GLUCOPHAGE-XR) 500 MG ER 24hr tablet Take 1 tablet (500 mg total) by mouth 2 (two) times daily with meals.  Qty: 180 tablet, Refills: 0      metoprolol succinate (TOPROL-XL) 50 MG 24 hr tablet Take 1 tablet (50 mg total) by mouth once daily.  Qty: 90 tablet, Refills: 0    Comments: .  Associated Diagnoses: Essential hypertension      mirabegron (MYRBETRIQ) 25 mg Tb24 ER tablet Take 1 tablet (25 mg total) by mouth once daily.  Qty: 30 tablet, Refills: 11      mycophenolate (CELLCEPT) 250 mg Cap Take 4 capsules (1,000 mg total) by mouth 2 (two) times daily.  Qty: 240 capsule, Refills: 11      nintedanib (OFEV) 150 mg Cap Take 1 capsule (150 mg total) by mouth 2 (two) times a day.  Qty: 60 capsule, Refills: 11    Associated Diagnoses: IPF (idiopathic pulmonary fibrosis)      nitroGLYCERIN (NITROSTAT) 0.3 MG SL tablet Place 1 tablet (0.3 mg total) under the tongue every 5 (five) minutes as needed for Chest pain.  Qty: 30 tablet, Refills: 0    Associated Diagnoses: Coronary artery disease involving native coronary artery of native heart with angina pectoris      omeprazole (PRILOSEC) 40 MG capsule Take 1 capsule (40 mg total) by mouth once daily.  Qty: 90 capsule, Refills: 0    Associated Diagnoses: Gastroesophageal reflux disease without esophagitis      ONETOUCH DELICA PLUS LANCET 33 gauge Misc Apply topically 3 (three) times daily.      pen needle, diabetic 32 gauge x 5/32" Ndle 1 each by Misc.(Non-Drug; Combo Route) route once daily.  Qty: 100 each, Refills: 0    Associated Diagnoses: Interstitial lung disease      predniSONE (DELTASONE) 10 MG tablet Take 4 tablets (40 mg total) by mouth once daily for 5 days, THEN 2 tablets (20 mg total) once daily for 5 days, THEN 1 tablet (10 mg total) once daily for 5 days, THEN 0.5 tablets (5 mg total) once daily for 5 days. Take 40 mg for 5 days, 20 mg for 5 days and 10 mg for 5 day.  Qty: 38 tablet, Refills: 0      rosuvastatin " (CRESTOR) 20 MG tablet Take 1 tablet (20 mg total) by mouth once daily.  Qty: 90 tablet, Refills: 3      sacubitriL-valsartan (ENTRESTO) 24-26 mg per tablet Take 1 tablet by mouth 2 (two) times daily.  Qty: 180 tablet, Refills: 3      simethicone (MYLICON) 80 MG chewable tablet Take 1 tablet (80 mg total) by mouth 3 (three) times daily as needed for Flatulence.  Qty: 30 tablet, Refills: 0      sucralfate (CARAFATE) 1 gram tablet Take 1 tablet (1 g total) by mouth 4 (four) times daily before meals and nightly.  Qty: 120 tablet, Refills: 2    Associated Diagnoses: Gastroesophageal reflux disease, unspecified whether esophagitis present      sulfamethoxazole-trimethoprim 800-160mg (BACTRIM DS) 800-160 mg Tab Take 1 tablet by mouth once daily.  Qty: 30 tablet, Refills: 4           STOP taking these medications       guaiFENesin (MUCINEX) 600 mg 12 hr tablet Comments:   Reason for Stopping:               I certify that this patient is confined to his home and needs intermittent skilled nursing care.

## 2025-04-01 NOTE — DISCHARGE INSTRUCTIONS
Our goal at [facility] is to always give you outstanding care and exceptional service. You may receive a survey from [employer] by mail, text or e-mail in the next 24-48 hours asking about the care you received with us. The survey should only take 5-10 minutes to complete and is very important to us.     Your feedback provides us with a way to recognize our staff who work tirelessly to provide the best care! Also, your responses help us learn how to improve when your experience was below our aspiration of excellence. We are always looking for ways to improve your stay. We WILL use your feedback to continue making improvements to help us provide the highest quality care. We keep your personal information and feedback confidential. We appreciate your time completing this survey and can't wait to hear from you!!!    We look forward to your continued care with us! Thanks so much for choosing [facility] for your healthcare needs!

## 2025-04-01 NOTE — NURSING
Pt seen ambulating in benitez with portable O2 tank, on 2L NC. Pt remain free from fall/injury. Safety measures maintained. Will cont to monitor

## 2025-04-02 ENCOUNTER — TELEPHONE (OUTPATIENT)
Dept: ENDOCRINOLOGY | Facility: CLINIC | Age: 75
End: 2025-04-02
Payer: MEDICARE

## 2025-04-02 ENCOUNTER — CLINICAL SUPPORT (OUTPATIENT)
Dept: DIABETES | Facility: CLINIC | Age: 75
End: 2025-04-02
Payer: MEDICARE

## 2025-04-02 DIAGNOSIS — E11.65 TYPE 2 DIABETES MELLITUS WITH HYPERGLYCEMIA, WITHOUT LONG-TERM CURRENT USE OF INSULIN: Primary | ICD-10-CM

## 2025-04-02 NOTE — TELEPHONE ENCOUNTER
----- Message from Angie sent at 4/2/2025 10:22 AM CDT -----  Regarding: Consult/Advisory/ Diabetes boyd  Contact: Emmanuel  Consult/Advisory Name Of Caller:Emmanuel Grant  Contact Preference:[phone] (home) , requesting a call back.  Nature of call: Pt is calling in regards to  the boyd that was given for diabetes is not working, he is not sure if he is using it correctly.

## 2025-04-02 NOTE — PROGRESS NOTES
Diabetes Care Specialist Progress Note  Author: Syeda Cabrales RN  Date: 4/2/2025        Intake  Program Intake  Reason for Diabetes Program Visit:: Intervention  Type of Intervention:: Individual  Individual: Device Training  Device Training: Personal CGM    Continuous Glucose Monitoring  Patient has CGM: Yes  Personal CGM type:: FreeStyle Hola 2  GMI Date: 04/02/25  GMI Value: 8.2 %    Lab Results   Component Value Date    HGBA1C 7.8 (H) 01/02/2025       Lifestyle Coping Support & Clinical  Lifestyle/Coping/Support  Psychosocial/Coping Skills Assessment Completed: : No  Deffered due to:: Time  Area of need?: Deferred      Diabetes Self-Management Skills Assessment  Medication Skills Assessment  Medication Skills Assessment Completed:: No  Deffered due to:: Time  Area of need?: Deferred    Diabetes Disease Process/Treatment Options  Diabetes Disease Process/Treatment Options: Skills Assessment Completed: No  Deferred due to:: Time  Area of need?: Deferred    Nutrition/Healthy Eating  Nutrition/Healthy Eating Skills Assessment Completed:: No  Deffered due to:: Time  Area of need?: Deferred    Physical Activity/Exercise  Physical Activity/Exercise Skills Assessment Completed: : No  Deffered due to:: Time  Area of need?: Deferred    Home Blood Glucose Monitoring  Patient states that blood sugar is checked at home daily.: yes  Monitoring Method:: personal continuous glucose monitor  Personal CGM type:: FreeStyle Hola 2   What is your current Time in Range?: 38%  What is your A1c Target?: 7.0%  Home Blood Glucose Monitoring Skills Assessment Completed: : Yes  Assessment indicates:: Knowledge deficit  Area of need?: Yes    Acute Complications  Acute Complications Skills Assessment Completed: : No  Deffered due to:: Time  Area of need?: Deferred    Chronic Complications  Chronic Complications Skills Assessment Completed: : No  Deferred due to:: Time  Area of need?: Deferred      Assessment Summary and Plan    Based on  today's diabetes care assessment, the following areas of need were identified:      Identified Areas of Need      Medication/Current Diabetes Treatment: Deferred   Lifestyle Coping/Support: Deferred   Diabetes Disease Process/Treatment Options: Deferred   Nutrition/Healthy Eating: Deferred    Physical Activity/Exercise: Deferred    Home Blood Glucose Monitoring: Yes - see care planning- connected to clinic   Acute Complications: Deferred    Chronic Complications: Deferred     Today's interventions were provided through individual discussion, instruction, and written materials were provided.      Patient verbalized understanding of instruction and written materials.  Pt was able to return back demonstration of instructions today. Patient understood key points, needs reinforcement and further instruction.     Diabetes Self-Management Care Plan:    Today's Diabetes Self-Management Care Plan was developed with Emmanuel's input. Emmanuel has agreed to work toward the following goal(s) to improve his/her overall diabetes control.      Care Plan: Diabetes Management   Updates made since 4/2/2024 12:00 AM        Problem: Medications         Goal: Patient Agrees to take Diabetes Medication(s) Humalog per sliding scale as prescribed.    Start Date: 8/7/2024   Expected End Date: 11/15/2024   This Visit's Progress: Deferred   Priority: High   Note:    Patient instructed how to use Humalog insulin pen to give injections per sliding scale.  MOA of insulin explained. Reviewed need for rotation of injection sites, appropriate insulin storage, safe disposal of used sharps and insulin pen preparation and use. Instructed how to use the sliding scale and when to give insulin. Performed adequate return demonstration with demo pen.     Deferred 4/2/25       Task: Reviewed with patient all current diabetes medications and provided basic review of the purpose, dosage, frequency, side effects, and storage of both oral and injectable diabetes  medications. Completed 8/21/2024     Task: Discussed guidelines for preventing, detecting and treating hypoglycemia and hyperglycemia and reviewed the importance of meal and medication timing with diabetes mediations for prevention of hypoglycemia and maximum drug benefit. Completed 8/21/2024        Problem: Blood Glucose Self-Monitoring         Goal: Patient agrees to check and record blood sugars using the Freestyle Hola system    Start Date: 8/7/2024   Expected End Date: 10/15/2024   This Visit's Progress: Not met   Priority: Medium   Note:    Patient was prescribed a Hola system, but had not picked it up from pharmacy.  Demonstrated how to insert sensor and aubrey was set up on his phone.     4/2/25 - - TROUBLESHOOTING FOR HOLA 2 CGM + APPLICATION TRAINING (Pt is connected to the clinic via [employer])   Email: [email or web address]  P/W: [email or web address]    Patient had difficulties getting libre2 sensor started. Patient had the Libre3 aubrey on his smart phone, but did not have the Libre2 Aubrey installed. Installed Libre2 Aubrey to smart phone and disconnected the Hola 3 Aubrey.     Reviewed how to use the Freestyle Hola 2 continuous glucose sensor system. Patient had already waisted 2 sensors trying to get the application started; customer service will replace. Customer service was call to help with initial troubleshooting getting the Libre3 Aubrey running.     Provided personal FreeStyle Hola 2 training - reviewed the following with patient:   No fingersticks required but if feeling s/s that do not match with SG reading or in event of hypoglycemia confirm with fingerstick  To receive alarms, reader must be within 20 feet  Low and High alarms discussed  Vitamin C (ascorbic acid) more than 500 mg can cause false readings  Can bath, shower or swim  Sensor is 14-day wear  FDA approved for back of the arm wear only  Sensor should be removed prior to exposing it to X-rays  Site rotation  1 hour warm-up period  Scan minimum every  5-8 hours for continual graph.   Encouraged patient to scan more often - before each meal and before bed.      Patient was wearing the Hola CGMS. Re-explained in detail how the CGM works.  He was instructed to obtain readings by placing  the reader over the sensor and will hear a beep indicating a reading has been made.     Customer support number was provided and instructed to call them for any additional help or questions.            Follow Up Plan   Follow up if symptoms worsen or fail to improve.    Today's care plan and follow up schedule was discussed with patient.  Emmanuel verbalized understanding of the care plan, goals, and agrees to follow up plan.        The patient was encouraged to communicate with his/her health care provider/physician and care team regarding his/her condition(s) and treatment.  I provided the patient with my contact information today and encouraged to contact me via phone or [facility] Patient Portal as needed.     Length of Visit   Total Time: 60 Minutes

## 2025-04-03 ENCOUNTER — HOSPITAL ENCOUNTER (INPATIENT)
Facility: HOSPITAL | Age: 75
LOS: 5 days | Discharge: HOSPICE | DRG: 196 | End: 2025-04-08
Attending: EMERGENCY MEDICINE | Admitting: HOSPITALIST
Payer: MEDICARE

## 2025-04-03 ENCOUNTER — OFFICE VISIT (OUTPATIENT)
Dept: ENDOCRINOLOGY | Facility: CLINIC | Age: 75
End: 2025-04-03
Payer: MEDICARE

## 2025-04-03 VITALS
HEART RATE: 138 BPM | WEIGHT: 128.63 LBS | TEMPERATURE: 99 F | DIASTOLIC BLOOD PRESSURE: 88 MMHG | BODY MASS INDEX: 19.55 KG/M2 | SYSTOLIC BLOOD PRESSURE: 120 MMHG

## 2025-04-03 DIAGNOSIS — Z13.6 SCREENING FOR CARDIOVASCULAR CONDITION: ICD-10-CM

## 2025-04-03 DIAGNOSIS — R10.84 GENERALIZED ABDOMINAL PAIN: ICD-10-CM

## 2025-04-03 DIAGNOSIS — T38.0X5A STEROID-INDUCED HYPERGLYCEMIA: ICD-10-CM

## 2025-04-03 DIAGNOSIS — R73.9 STEROID-INDUCED HYPERGLYCEMIA: ICD-10-CM

## 2025-04-03 DIAGNOSIS — E11.65 TYPE 2 DIABETES MELLITUS WITH HYPERGLYCEMIA, WITHOUT LONG-TERM CURRENT USE OF INSULIN: Primary | ICD-10-CM

## 2025-04-03 DIAGNOSIS — R00.0 SINUS TACHYCARDIA: ICD-10-CM

## 2025-04-03 DIAGNOSIS — J84.112 IPF (IDIOPATHIC PULMONARY FIBROSIS): Primary | ICD-10-CM

## 2025-04-03 DIAGNOSIS — R00.0 TACHYCARDIA: ICD-10-CM

## 2025-04-03 DIAGNOSIS — R07.9 CHEST PAIN: ICD-10-CM

## 2025-04-03 DIAGNOSIS — R06.00 DYSPNEA, UNSPECIFIED TYPE: ICD-10-CM

## 2025-04-03 PROBLEM — K29.70 GASTRITIS: Status: ACTIVE | Noted: 2025-04-03

## 2025-04-03 LAB
ABSOLUTE EOSINOPHIL (OHS): 0.45 K/UL
ABSOLUTE MONOCYTE (OHS): 0.55 K/UL (ref 0.3–1)
ABSOLUTE NEUTROPHIL COUNT (OHS): 13.83 K/UL (ref 1.8–7.7)
ALBUMIN SERPL BCP-MCNC: 3.9 G/DL (ref 3.5–5.2)
ALLENS TEST: ABNORMAL
ALP SERPL-CCNC: 69 UNIT/L (ref 40–150)
ALT SERPL W/O P-5'-P-CCNC: 29 UNIT/L (ref 10–44)
AMPHET UR QL SCN: NEGATIVE
ANION GAP (OHS): 15 MMOL/L (ref 8–16)
APTT PPP: 21.5 SECONDS (ref 21–32)
AST SERPL-CCNC: 33 UNIT/L (ref 11–45)
B-OH-BUTYR BLD STRIP-SCNC: 0.2 MMOL/L
BARBITURATE SCN PRESENT UR: NEGATIVE
BASOPHILS # BLD AUTO: 0.03 K/UL
BASOPHILS NFR BLD AUTO: 0.2 %
BENZODIAZ UR QL SCN: NEGATIVE
BILIRUB SERPL-MCNC: 0.8 MG/DL (ref 0.1–1)
BILIRUB UR QL STRIP.AUTO: NEGATIVE
BNP SERPL-MCNC: 115 PG/ML (ref 0–99)
BUN SERPL-MCNC: 27 MG/DL (ref 8–23)
CALCIUM SERPL-MCNC: 9.4 MG/DL (ref 8.7–10.5)
CANNABINOIDS UR QL SCN: NEGATIVE
CHLORIDE SERPL-SCNC: 104 MMOL/L (ref 95–110)
CLARITY UR: CLEAR
CO2 SERPL-SCNC: 18 MMOL/L (ref 23–29)
COCAINE UR QL SCN: NEGATIVE
COLOR UR AUTO: YELLOW
CREAT SERPL-MCNC: 1 MG/DL (ref 0.5–1.4)
CREAT UR-MCNC: 96.1 MG/DL (ref 23–375)
CTP QC/QA: YES
CTP QC/QA: YES
D DIMER PPP IA.FEU-MCNC: 1.55 MG/L FEU
DELSYS: ABNORMAL
ERYTHROCYTE [DISTWIDTH] IN BLOOD BY AUTOMATED COUNT: 15 % (ref 11.5–14.5)
ETHANOL SERPL-MCNC: <10 MG/DL
FLOW: 3
GFR SERPLBLD CREATININE-BSD FMLA CKD-EPI: >60 ML/MIN/1.73/M2
GLUCOSE SERPL-MCNC: 110 MG/DL (ref 70–110)
GLUCOSE UR QL STRIP: NEGATIVE
HCO3 UR-SCNC: 25.7 MMOL/L (ref 24–28)
HCT VFR BLD AUTO: 47.9 % (ref 40–54)
HGB BLD-MCNC: 16.1 GM/DL (ref 14–18)
HGB UR QL STRIP: NEGATIVE
IMM GRANULOCYTES # BLD AUTO: 0.08 K/UL (ref 0–0.04)
IMM GRANULOCYTES NFR BLD AUTO: 0.5 % (ref 0–0.5)
INR PPP: 1 (ref 0.8–1.2)
KETONES UR QL STRIP: NEGATIVE
LACTATE SERPL-SCNC: 1.8 MMOL/L (ref 0.5–2.2)
LACTATE SERPL-SCNC: 2.5 MMOL/L (ref 0.5–2.2)
LACTATE SERPL-SCNC: 3.6 MMOL/L (ref 0.5–2.2)
LEUKOCYTE ESTERASE UR QL STRIP: NEGATIVE
LIPASE SERPL-CCNC: 92 U/L (ref 4–60)
LYMPHOCYTES # BLD AUTO: 0.35 K/UL (ref 1–4.8)
MAGNESIUM SERPL-MCNC: 1.8 MG/DL (ref 1.6–2.6)
MCH RBC QN AUTO: 28.1 PG (ref 27–31)
MCHC RBC AUTO-ENTMCNC: 33.6 G/DL (ref 32–36)
MCV RBC AUTO: 84 FL (ref 82–98)
METHADONE UR QL SCN: NEGATIVE
MODE: ABNORMAL
NITRITE UR QL STRIP: NEGATIVE
NUCLEATED RBC (/100WBC) (OHS): 0 /100 WBC
OPIATES UR QL SCN: ABNORMAL
PCO2 BLDA: 43.9 MMHG (ref 35–45)
PCP UR QL: NEGATIVE
PH SMN: 7.38 [PH] (ref 7.35–7.45)
PH UR STRIP: 6 [PH]
PHOSPHATE SERPL-MCNC: 2.9 MG/DL (ref 2.7–4.5)
PLATELET # BLD AUTO: 293 K/UL (ref 150–450)
PMV BLD AUTO: 10.2 FL (ref 9.2–12.9)
PO2 BLDA: 33 MMHG (ref 40–60)
POC BE: 0 MMOL/L
POC MOLECULAR INFLUENZA A AGN: NEGATIVE
POC MOLECULAR INFLUENZA B AGN: NEGATIVE
POC SATURATED O2: 61 % (ref 95–100)
POC TCO2: 27 MMOL/L (ref 24–29)
POTASSIUM SERPL-SCNC: 5 MMOL/L (ref 3.5–5.1)
PROCALCITONIN SERPL-MCNC: 0.33 NG/ML
PROT SERPL-MCNC: 8.3 GM/DL (ref 6–8.4)
PROT UR QL STRIP: ABNORMAL
PROTHROMBIN TIME: 11.2 SECONDS (ref 9–12.5)
RBC # BLD AUTO: 5.72 M/UL (ref 4.6–6.2)
RELATIVE EOSINOPHIL (OHS): 2.9 %
RELATIVE LYMPHOCYTE (OHS): 2.3 % (ref 18–48)
RELATIVE MONOCYTE (OHS): 3.6 % (ref 4–15)
RELATIVE NEUTROPHIL (OHS): 90.5 % (ref 38–73)
SAMPLE: ABNORMAL
SARS-COV-2 RDRP RESP QL NAA+PROBE: NEGATIVE
SITE: ABNORMAL
SODIUM SERPL-SCNC: 137 MMOL/L (ref 136–145)
SP GR UR STRIP: 1.02
SP02: 99
TROPONIN I SERPL DL<=0.01 NG/ML-MCNC: 0.01 NG/ML
TSH SERPL-ACNC: 0.14 UIU/ML (ref 0.4–4)
UROBILINOGEN UR STRIP-ACNC: NEGATIVE EU/DL
WBC # BLD AUTO: 15.29 K/UL (ref 3.9–12.7)

## 2025-04-03 PROCEDURE — 83605 ASSAY OF LACTIC ACID: CPT | Mod: HCNC | Performed by: EMERGENCY MEDICINE

## 2025-04-03 PROCEDURE — 84443 ASSAY THYROID STIM HORMONE: CPT | Mod: HCNC | Performed by: EMERGENCY MEDICINE

## 2025-04-03 PROCEDURE — 80053 COMPREHEN METABOLIC PANEL: CPT | Mod: HCNC | Performed by: EMERGENCY MEDICINE

## 2025-04-03 PROCEDURE — 99900035 HC TECH TIME PER 15 MIN (STAT): Mod: HCNC

## 2025-04-03 PROCEDURE — 84484 ASSAY OF TROPONIN QUANT: CPT | Mod: HCNC | Performed by: EMERGENCY MEDICINE

## 2025-04-03 PROCEDURE — 82010 KETONE BODYS QUAN: CPT | Mod: HCNC | Performed by: EMERGENCY MEDICINE

## 2025-04-03 PROCEDURE — 82803 BLOOD GASES ANY COMBINATION: CPT | Mod: HCNC

## 2025-04-03 PROCEDURE — 83690 ASSAY OF LIPASE: CPT | Mod: HCNC | Performed by: EMERGENCY MEDICINE

## 2025-04-03 PROCEDURE — 93005 ELECTROCARDIOGRAM TRACING: CPT | Mod: HCNC

## 2025-04-03 PROCEDURE — 96375 TX/PRO/DX INJ NEW DRUG ADDON: CPT | Mod: HCNC

## 2025-04-03 PROCEDURE — 84145 PROCALCITONIN (PCT): CPT | Mod: HCNC | Performed by: EMERGENCY MEDICINE

## 2025-04-03 PROCEDURE — 81003 URINALYSIS AUTO W/O SCOPE: CPT | Mod: HCNC,59 | Performed by: EMERGENCY MEDICINE

## 2025-04-03 PROCEDURE — 82077 ASSAY SPEC XCP UR&BREATH IA: CPT | Mod: HCNC | Performed by: EMERGENCY MEDICINE

## 2025-04-03 PROCEDURE — 87502 INFLUENZA DNA AMP PROBE: CPT | Mod: HCNC

## 2025-04-03 PROCEDURE — 12000002 HC ACUTE/MED SURGE SEMI-PRIVATE ROOM: Mod: HCNC

## 2025-04-03 PROCEDURE — 87040 BLOOD CULTURE FOR BACTERIA: CPT | Mod: HCNC | Performed by: EMERGENCY MEDICINE

## 2025-04-03 PROCEDURE — 85610 PROTHROMBIN TIME: CPT | Mod: HCNC | Performed by: EMERGENCY MEDICINE

## 2025-04-03 PROCEDURE — 85379 FIBRIN DEGRADATION QUANT: CPT | Mod: HCNC | Performed by: EMERGENCY MEDICINE

## 2025-04-03 PROCEDURE — 63600175 PHARM REV CODE 636 W HCPCS: Mod: HCNC | Performed by: EMERGENCY MEDICINE

## 2025-04-03 PROCEDURE — 83880 ASSAY OF NATRIURETIC PEPTIDE: CPT | Mod: HCNC | Performed by: EMERGENCY MEDICINE

## 2025-04-03 PROCEDURE — 80307 DRUG TEST PRSMV CHEM ANLYZR: CPT | Mod: HCNC | Performed by: EMERGENCY MEDICINE

## 2025-04-03 PROCEDURE — 83735 ASSAY OF MAGNESIUM: CPT | Mod: HCNC | Performed by: EMERGENCY MEDICINE

## 2025-04-03 PROCEDURE — 94640 AIRWAY INHALATION TREATMENT: CPT | Mod: HCNC

## 2025-04-03 PROCEDURE — 96366 THER/PROPH/DIAG IV INF ADDON: CPT | Mod: HCNC

## 2025-04-03 PROCEDURE — 25500020 PHARM REV CODE 255: Mod: HCNC | Performed by: EMERGENCY MEDICINE

## 2025-04-03 PROCEDURE — 99285 EMERGENCY DEPT VISIT HI MDM: CPT | Mod: 25,HCNC

## 2025-04-03 PROCEDURE — 96374 THER/PROPH/DIAG INJ IV PUSH: CPT | Mod: 59,HCNC

## 2025-04-03 PROCEDURE — 96365 THER/PROPH/DIAG IV INF INIT: CPT | Mod: HCNC

## 2025-04-03 PROCEDURE — 96361 HYDRATE IV INFUSION ADD-ON: CPT | Mod: HCNC

## 2025-04-03 PROCEDURE — 99291 CRITICAL CARE FIRST HOUR: CPT | Mod: HCNC

## 2025-04-03 PROCEDURE — 25000242 PHARM REV CODE 250 ALT 637 W/ HCPCS: Mod: HCNC | Performed by: STUDENT IN AN ORGANIZED HEALTH CARE EDUCATION/TRAINING PROGRAM

## 2025-04-03 PROCEDURE — 94799 UNLISTED PULMONARY SVC/PX: CPT | Mod: HCNC

## 2025-04-03 PROCEDURE — 85025 COMPLETE CBC W/AUTO DIFF WBC: CPT | Mod: HCNC | Performed by: EMERGENCY MEDICINE

## 2025-04-03 PROCEDURE — 99999 PR PBB SHADOW E&M-EST. PATIENT-LVL II: CPT | Mod: PBBFAC,HCNC,, | Performed by: NURSE PRACTITIONER

## 2025-04-03 PROCEDURE — 63600175 PHARM REV CODE 636 W HCPCS: Mod: HCNC | Performed by: STUDENT IN AN ORGANIZED HEALTH CARE EDUCATION/TRAINING PROGRAM

## 2025-04-03 PROCEDURE — 27000221 HC OXYGEN, UP TO 24 HOURS: Mod: HCNC

## 2025-04-03 PROCEDURE — 87635 SARS-COV-2 COVID-19 AMP PRB: CPT | Mod: HCNC | Performed by: EMERGENCY MEDICINE

## 2025-04-03 PROCEDURE — 25500020 PHARM REV CODE 255: Mod: HCNC | Performed by: HOSPITALIST

## 2025-04-03 PROCEDURE — 85730 THROMBOPLASTIN TIME PARTIAL: CPT | Mod: HCNC | Performed by: EMERGENCY MEDICINE

## 2025-04-03 PROCEDURE — 93010 ELECTROCARDIOGRAM REPORT: CPT | Mod: HCNC,,, | Performed by: INTERNAL MEDICINE

## 2025-04-03 PROCEDURE — 25000003 PHARM REV CODE 250: Mod: HCNC | Performed by: EMERGENCY MEDICINE

## 2025-04-03 PROCEDURE — 94761 N-INVAS EAR/PLS OXIMETRY MLT: CPT | Mod: HCNC,XB

## 2025-04-03 PROCEDURE — 84100 ASSAY OF PHOSPHORUS: CPT | Mod: HCNC | Performed by: EMERGENCY MEDICINE

## 2025-04-03 RX ORDER — ACETAMINOPHEN 325 MG/1
650 TABLET ORAL EVERY 4 HOURS PRN
Status: DISCONTINUED | OUTPATIENT
Start: 2025-04-03 | End: 2025-04-07

## 2025-04-03 RX ORDER — GLUCAGON 1 MG
1 KIT INJECTION
Status: DISCONTINUED | OUTPATIENT
Start: 2025-04-03 | End: 2025-04-08 | Stop reason: HOSPADM

## 2025-04-03 RX ORDER — GUAIFENESIN AND DEXTROMETHORPHAN HYDROBROMIDE 10; 100 MG/5ML; MG/5ML
10 SYRUP ORAL EVERY 4 HOURS PRN
Status: DISCONTINUED | OUTPATIENT
Start: 2025-04-03 | End: 2025-04-08 | Stop reason: HOSPADM

## 2025-04-03 RX ORDER — FUROSEMIDE 20 MG/1
20 TABLET ORAL DAILY
Status: DISCONTINUED | OUTPATIENT
Start: 2025-04-04 | End: 2025-04-04

## 2025-04-03 RX ORDER — LORAZEPAM 2 MG/ML
1 INJECTION INTRAMUSCULAR
Status: COMPLETED | OUTPATIENT
Start: 2025-04-03 | End: 2025-04-03

## 2025-04-03 RX ORDER — PREDNISONE 5 MG/1
5 TABLET ORAL DAILY
Status: DISCONTINUED | OUTPATIENT
Start: 2025-04-12 | End: 2025-04-04

## 2025-04-03 RX ORDER — LATANOPROST 50 UG/ML
1 SOLUTION/ DROPS OPHTHALMIC NIGHTLY
Status: DISCONTINUED | OUTPATIENT
Start: 2025-04-03 | End: 2025-04-08 | Stop reason: HOSPADM

## 2025-04-03 RX ORDER — SODIUM,POTASSIUM PHOSPHATES 280-250MG
2 POWDER IN PACKET (EA) ORAL
Status: DISCONTINUED | OUTPATIENT
Start: 2025-04-03 | End: 2025-04-08 | Stop reason: HOSPADM

## 2025-04-03 RX ORDER — METFORMIN HYDROCHLORIDE 500 MG/1
500 TABLET, EXTENDED RELEASE ORAL 2 TIMES DAILY WITH MEALS
Qty: 180 TABLET | Refills: 0 | Status: ON HOLD | OUTPATIENT
Start: 2025-04-03 | End: 2026-04-03

## 2025-04-03 RX ORDER — ENOXAPARIN SODIUM 100 MG/ML
40 INJECTION SUBCUTANEOUS EVERY 24 HOURS
Status: DISCONTINUED | OUTPATIENT
Start: 2025-04-03 | End: 2025-04-08 | Stop reason: HOSPADM

## 2025-04-03 RX ORDER — SODIUM CHLORIDE 0.9 % (FLUSH) 0.9 %
10 SYRINGE (ML) INJECTION EVERY 12 HOURS PRN
Status: DISCONTINUED | OUTPATIENT
Start: 2025-04-03 | End: 2025-04-08 | Stop reason: HOSPADM

## 2025-04-03 RX ORDER — ACETAMINOPHEN 325 MG/1
650 TABLET ORAL EVERY 8 HOURS PRN
Status: DISCONTINUED | OUTPATIENT
Start: 2025-04-03 | End: 2025-04-07

## 2025-04-03 RX ORDER — AMOXICILLIN 250 MG
1 CAPSULE ORAL DAILY PRN
Status: DISCONTINUED | OUTPATIENT
Start: 2025-04-03 | End: 2025-04-06 | Stop reason: SDUPTHER

## 2025-04-03 RX ORDER — NALOXONE HCL 0.4 MG/ML
0.02 VIAL (ML) INJECTION
Status: DISCONTINUED | OUTPATIENT
Start: 2025-04-03 | End: 2025-04-08 | Stop reason: HOSPADM

## 2025-04-03 RX ORDER — DIPHENHYDRAMINE HYDROCHLORIDE 50 MG/ML
25 INJECTION, SOLUTION INTRAMUSCULAR; INTRAVENOUS
Status: COMPLETED | OUTPATIENT
Start: 2025-04-03 | End: 2025-04-03

## 2025-04-03 RX ORDER — ONDANSETRON HYDROCHLORIDE 2 MG/ML
4 INJECTION, SOLUTION INTRAVENOUS
Status: COMPLETED | OUTPATIENT
Start: 2025-04-03 | End: 2025-04-03

## 2025-04-03 RX ORDER — IBUPROFEN 200 MG
24 TABLET ORAL
Status: DISCONTINUED | OUTPATIENT
Start: 2025-04-03 | End: 2025-04-08 | Stop reason: HOSPADM

## 2025-04-03 RX ORDER — PREDNISONE 20 MG/1
20 TABLET ORAL DAILY
Status: DISCONTINUED | OUTPATIENT
Start: 2025-04-04 | End: 2025-04-04

## 2025-04-03 RX ORDER — BISACODYL 10 MG/1
10 SUPPOSITORY RECTAL DAILY PRN
Status: DISCONTINUED | OUTPATIENT
Start: 2025-04-03 | End: 2025-04-08 | Stop reason: HOSPADM

## 2025-04-03 RX ORDER — NAPROXEN SODIUM 220 MG/1
81 TABLET, FILM COATED ORAL DAILY
Status: DISCONTINUED | OUTPATIENT
Start: 2025-04-04 | End: 2025-04-08 | Stop reason: HOSPADM

## 2025-04-03 RX ORDER — IBUPROFEN 200 MG
16 TABLET ORAL
Status: DISCONTINUED | OUTPATIENT
Start: 2025-04-03 | End: 2025-04-08 | Stop reason: HOSPADM

## 2025-04-03 RX ORDER — METHYLPREDNISOLONE SOD SUCC 125 MG
125 VIAL (EA) INJECTION
Status: COMPLETED | OUTPATIENT
Start: 2025-04-03 | End: 2025-04-03

## 2025-04-03 RX ORDER — ARFORMOTEROL TARTRATE 15 UG/2ML
15 SOLUTION RESPIRATORY (INHALATION) 2 TIMES DAILY
Status: DISCONTINUED | OUTPATIENT
Start: 2025-04-03 | End: 2025-04-08 | Stop reason: HOSPADM

## 2025-04-03 RX ORDER — TALC
6 POWDER (GRAM) TOPICAL NIGHTLY PRN
Status: DISCONTINUED | OUTPATIENT
Start: 2025-04-03 | End: 2025-04-08 | Stop reason: HOSPADM

## 2025-04-03 RX ORDER — ALUMINUM HYDROXIDE, MAGNESIUM HYDROXIDE, AND SIMETHICONE 1200; 120; 1200 MG/30ML; MG/30ML; MG/30ML
30 SUSPENSION ORAL 4 TIMES DAILY PRN
Status: DISCONTINUED | OUTPATIENT
Start: 2025-04-03 | End: 2025-04-08 | Stop reason: HOSPADM

## 2025-04-03 RX ORDER — PROCHLORPERAZINE EDISYLATE 5 MG/ML
5 INJECTION INTRAMUSCULAR; INTRAVENOUS EVERY 6 HOURS PRN
Status: DISCONTINUED | OUTPATIENT
Start: 2025-04-03 | End: 2025-04-08 | Stop reason: HOSPADM

## 2025-04-03 RX ORDER — CEFEPIME HYDROCHLORIDE 1 G/1
1 INJECTION, POWDER, FOR SOLUTION INTRAMUSCULAR; INTRAVENOUS
Status: COMPLETED | OUTPATIENT
Start: 2025-04-03 | End: 2025-04-03

## 2025-04-03 RX ORDER — LANOLIN ALCOHOL/MO/W.PET/CERES
800 CREAM (GRAM) TOPICAL
Status: DISCONTINUED | OUTPATIENT
Start: 2025-04-03 | End: 2025-04-08 | Stop reason: HOSPADM

## 2025-04-03 RX ORDER — INSULIN ASPART 100 [IU]/ML
0-5 INJECTION, SOLUTION INTRAVENOUS; SUBCUTANEOUS
Status: DISCONTINUED | OUTPATIENT
Start: 2025-04-03 | End: 2025-04-08 | Stop reason: HOSPADM

## 2025-04-03 RX ORDER — SUCRALFATE 1 G/1
1 TABLET ORAL
Status: DISCONTINUED | OUTPATIENT
Start: 2025-04-04 | End: 2025-04-08 | Stop reason: HOSPADM

## 2025-04-03 RX ORDER — FAMOTIDINE 20 MG/1
20 TABLET, FILM COATED ORAL 2 TIMES DAILY PRN
Status: DISCONTINUED | OUTPATIENT
Start: 2025-04-03 | End: 2025-04-08 | Stop reason: HOSPADM

## 2025-04-03 RX ORDER — MYCOPHENOLATE MOFETIL 250 MG/1
1000 CAPSULE ORAL 2 TIMES DAILY
Status: DISCONTINUED | OUTPATIENT
Start: 2025-04-03 | End: 2025-04-08 | Stop reason: HOSPADM

## 2025-04-03 RX ORDER — BUDESONIDE 0.5 MG/2ML
1 INHALANT ORAL 2 TIMES DAILY
Status: DISCONTINUED | OUTPATIENT
Start: 2025-04-03 | End: 2025-04-08 | Stop reason: HOSPADM

## 2025-04-03 RX ORDER — ONDANSETRON HYDROCHLORIDE 2 MG/ML
4 INJECTION, SOLUTION INTRAVENOUS EVERY 8 HOURS PRN
Status: DISCONTINUED | OUTPATIENT
Start: 2025-04-03 | End: 2025-04-08 | Stop reason: HOSPADM

## 2025-04-03 RX ORDER — SIMETHICONE 80 MG
1 TABLET,CHEWABLE ORAL 4 TIMES DAILY PRN
Status: DISCONTINUED | OUTPATIENT
Start: 2025-04-03 | End: 2025-04-08 | Stop reason: HOSPADM

## 2025-04-03 RX ORDER — METOPROLOL SUCCINATE 50 MG/1
50 TABLET, EXTENDED RELEASE ORAL DAILY
Status: DISCONTINUED | OUTPATIENT
Start: 2025-04-04 | End: 2025-04-08 | Stop reason: HOSPADM

## 2025-04-03 RX ORDER — PANTOPRAZOLE SODIUM 40 MG/1
40 TABLET, DELAYED RELEASE ORAL DAILY
Status: DISCONTINUED | OUTPATIENT
Start: 2025-04-04 | End: 2025-04-08 | Stop reason: HOSPADM

## 2025-04-03 RX ORDER — AMLODIPINE BESYLATE 5 MG/1
10 TABLET ORAL DAILY
Status: DISCONTINUED | OUTPATIENT
Start: 2025-04-04 | End: 2025-04-08 | Stop reason: HOSPADM

## 2025-04-03 RX ORDER — SUCRALFATE 1 G/10ML
1 SUSPENSION ORAL
Status: COMPLETED | OUTPATIENT
Start: 2025-04-03 | End: 2025-04-03

## 2025-04-03 RX ORDER — METHYLPREDNISOLONE SOD SUCC 125 MG
60 VIAL (EA) INJECTION EVERY 8 HOURS
Status: DISCONTINUED | OUTPATIENT
Start: 2025-04-03 | End: 2025-04-03

## 2025-04-03 RX ORDER — PREDNISONE 5 MG/1
10 TABLET ORAL DAILY
Status: DISCONTINUED | OUTPATIENT
Start: 2025-04-07 | End: 2025-04-04

## 2025-04-03 RX ORDER — MORPHINE SULFATE 4 MG/ML
4 INJECTION, SOLUTION INTRAMUSCULAR; INTRAVENOUS
Refills: 0 | Status: COMPLETED | OUTPATIENT
Start: 2025-04-03 | End: 2025-04-03

## 2025-04-03 RX ORDER — OXYBUTYNIN CHLORIDE 5 MG/1
5 TABLET, EXTENDED RELEASE ORAL DAILY
Status: DISCONTINUED | OUTPATIENT
Start: 2025-04-04 | End: 2025-04-08 | Stop reason: HOSPADM

## 2025-04-03 RX ADMIN — ENOXAPARIN SODIUM 40 MG: 40 INJECTION SUBCUTANEOUS at 09:04

## 2025-04-03 RX ADMIN — ARFORMOTEROL TARTRATE 15 MCG: 15 SOLUTION RESPIRATORY (INHALATION) at 10:04

## 2025-04-03 RX ADMIN — MORPHINE SULFATE 4 MG: 4 INJECTION INTRAVENOUS at 03:04

## 2025-04-03 RX ADMIN — IOHEXOL 75 ML: 350 INJECTION, SOLUTION INTRAVENOUS at 05:04

## 2025-04-03 RX ADMIN — SODIUM CHLORIDE 500 ML: 9 INJECTION, SOLUTION INTRAVENOUS at 03:04

## 2025-04-03 RX ADMIN — ONDANSETRON 4 MG: 2 INJECTION INTRAMUSCULAR; INTRAVENOUS at 03:04

## 2025-04-03 RX ADMIN — CEFEPIME 1 G: 1 INJECTION, POWDER, FOR SOLUTION INTRAMUSCULAR; INTRAVENOUS at 05:04

## 2025-04-03 RX ADMIN — VANCOMYCIN HYDROCHLORIDE 1250 MG: 1.25 INJECTION, POWDER, LYOPHILIZED, FOR SOLUTION INTRAVENOUS at 05:04

## 2025-04-03 RX ADMIN — METHYLPREDNISOLONE SODIUM SUCCINATE 125 MG: 125 INJECTION, POWDER, FOR SOLUTION INTRAMUSCULAR; INTRAVENOUS at 09:04

## 2025-04-03 RX ADMIN — LORAZEPAM 1 MG: 2 INJECTION INTRAMUSCULAR; INTRAVENOUS at 08:04

## 2025-04-03 RX ADMIN — SUCRALFATE 1 G: 1 SUSPENSION ORAL at 08:04

## 2025-04-03 RX ADMIN — BUDESONIDE 1 MG: 0.5 INHALANT RESPIRATORY (INHALATION) at 10:04

## 2025-04-03 RX ADMIN — DIPHENHYDRAMINE HYDROCHLORIDE 25 MG: 50 INJECTION INTRAMUSCULAR; INTRAVENOUS at 05:04

## 2025-04-03 RX ADMIN — IOHEXOL 60 ML: 350 INJECTION, SOLUTION INTRAVENOUS at 09:04

## 2025-04-03 NOTE — ED NOTES
Pt refusing to stay in bed. Pt currently out of stretcher. Pt given a chair to sit in. MD notified that pt is requesting itching medicine.

## 2025-04-03 NOTE — ED NOTES
"Pt to the ED cc of SOB x 1 day. Pt reports being at his "diabetes doctor" & he sent him here for the increase in SOB. Pt also reports mid-sternal non-radiating CP that feels like "pressure" & abdominal pain upon palpation that started today. Pt on 3L of o2 at home. Pt currently on 3L o2 & sats are at 100%.   "
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
## 2025-04-03 NOTE — ED NOTES
Pt attempting to get out of bed. Pt educated multiple times to stay in bed. Pt redirected back in bed.

## 2025-04-03 NOTE — ED PROVIDER NOTES
"Encounter Date: 4/3/2025    SCRIBE #1 NOTE: I, Elidia Bailey, am scribing for, and in the presence of,  David Leonardo MD. I have scribed the following portions of the note - Other sections scribed: HPI, ROS.       History     Chief Complaint   Patient presents with    Shortness of Breath    Tachycardia     Pt from endocrinology clinic with SOB and tachycardia. Pt placed on 3L O2 at clinic sat 100%,  in triage. Hx of MI with stent placement.     Abdominal Pain     Pt also reports abd pain and SHAHNAZ Grant is a 74 y.o. male with PMHx of cute hypoxic respiratory failure (on 3L oxygen), CAD s/p CABG, MI, Chronic HFrEF with visually estimated ejection fracture of 40 - 45%, DM, HTN,  and CVA with left sided weakness who presents to the ED with shortness of breath x 2 days. Patient reports he was seen at this facility 2 days ago (4/1/25) with the same complaint.     Per chart review, patient was hospitalized from 03/29/25-04/01/25 after presenting to the ED with substernal chest pain and pressure that radiated into his midepigastrium abdominal cavity. X-Ray Chest revealed interval development of mild streaky opacity in the left lower lung field, which may be related to atelectasis versus early infiltrate.Chronic interstitial lung disease. Spoke with Hospital Medicine who concur with the admitting the patient to the hospital for potential diuresis and IV antibiotic therapy.    Patient reports he felt better upon discharge 2 days ago but reports his SOB worsened this morning with a dry cough and generalized abdominal pain. Patient reports he was at the endocrinology clinic [facility] where he was found to be tachycardic and MD prompted him to come to the ED for further evaluation. Patient states his PCP/specialists "don't believe me when I say something is wrong". Patient is anticoagulated on Aspirin 81 mg, daily. Denies being on diuretics. NKDA. No other exacerbating or alleviating factors. Denies nausea, " "emesis, diarrhea, or other associated symptoms.       The history is provided by the patient and medical records. No  was used.     Review of patient's allergies indicates:   Allergen Reactions    Dapagliflozin      Other reaction(s): Other (See Comments)    Pcn [penicillins]     Linagliptin Other (See Comments)     "it knocked me down", "it almost killed me"    Lisinopril Other (See Comments)     cough    Pantoprazole Hives     Past Medical History:   Diagnosis Date    Acute hypoxic respiratory failure 09/23/2024    Oxygen therapy 24/7 now, continue 2 L oxygen therapy.      BPH (benign prostatic hyperplasia) 02/28/2024    CAD (coronary artery disease)     Sees [facility], h/o stent    Chronic HFrEF (heart failure with reduced ejection fraction) 05/01/2024    Diabetes mellitus     Hypertension     Kidney stone     Stroke     age 62    Type 2 diabetes mellitus without complication, without long-term current use of insulin 10/08/2021     Past Surgical History:   Procedure Laterality Date    24 HOUR IMPEDANCE PH MONITORING OF ESOPHAGUS IN PATIENT NOT TAKING ACID REDUCING MEDICATIONS N/A 11/13/2024    Procedure: IMPEDANCE PH STUDY, ESOPHAGEAL, 24 HOUR, IN PATIENT NOT TAKING ACID REDUCING MEDICATION;  Surgeon: Stephany Mendoza MD;  Location: [facility] ([address] [place] [address]);  Service: Endoscopy;  Laterality: N/A;  referral dr miguel/ off ppi / prep inst sent to pt via mail  11/6- precall attempted no answer. Unable to [facility]-  11/8 - pt not called, per chart review patient currently inpatient at [place] [address]    CARDIAC CATHETERIZATION      with stent    COLONOSCOPY N/A 03/27/2024    Procedure: COLONOSCOPY;  Surgeon: Ariela Castro MD;  Location: [facility];  Service: Endoscopy;  Laterality: N/A;    ESOPHAGEAL MANOMETRY WITH MEASUREMENT OF IMPEDANCE N/A 11/13/2024    Procedure: MANOMETRY, ESOPHAGUS, WITH IMPEDANCE MEASUREMENT;  Surgeon: Stephany Mendoza MD;  Location: [facility] ([address] [place] [address]);  Service: Endoscopy;  " Laterality: N/A;    ESOPHAGOGASTRODUODENOSCOPY N/A 03/27/2024    Procedure: EGD (ESOPHAGOGASTRODUODENOSCOPY);  Surgeon: Ariela Castro MD;  Location: [facility] ENDO;  Service: Endoscopy;  Laterality: N/A;    LEFT HEART CATHETERIZATION Left 9/10/2024    Procedure: Left heart cath;  Surgeon: Jemal Drummond MD;  Location: [facility] CATH LAB;  Service: Cardiology;  Laterality: Left;    SHOULDER SURGERY Right      Family History   Problem Relation Name Age of Onset    Cancer Mother      Heart disease Sister      Colon cancer Sister      COPD Brother      Esophageal cancer Neg Hx       Social History[1]  Review of Systems   Constitutional: Negative.    HENT: Negative.     Eyes: Negative.    Respiratory:  Positive for cough and shortness of breath.    Cardiovascular: Negative.    Gastrointestinal:  Positive for abdominal pain (generalized). Negative for diarrhea, nausea and vomiting.   Endocrine: Negative.    Genitourinary: Negative.    Musculoskeletal: Negative.    Skin: Negative.    Allergic/Immunologic: Negative.    Neurological: Negative.    Hematological: Negative.    Psychiatric/Behavioral: Negative.         Physical Exam     Initial Vitals [04/03/25 1409]   BP Pulse Resp Temp SpO2   (!) 143/94 (!) 140 (!) 24 98 °F (36.7 °C) 100 %      MAP       --         Physical Exam    Nursing note and vitals reviewed.  Constitutional: He is not diaphoretic. He appears distressed (moderately).   HENT:   Head: Normocephalic and atraumatic.   Nose: Nose normal. Mouth/Throat: No oropharyngeal exudate.   Eyes: EOM are normal. Pupils are equal, round, and reactive to light.   Neck: Neck supple. No tracheal deviation present. No JVD present.   Normal range of motion.  Cardiovascular:  Regular rhythm, normal heart sounds and intact distal pulses.           Tachycardic with regular appearing rhythm   Pulmonary/Chest: He is in respiratory distress (tachypneic, mild wheeze noted apically bilaterally). He has no wheezes. He has no  rhonchi. He has no rales.   Abdominal: Abdomen is soft. Bowel sounds are normal. He exhibits no distension. There is abdominal tenderness (epigastrically). There is no rebound and no guarding.   Musculoskeletal:         General: No tenderness or edema. Normal range of motion.      Cervical back: Normal range of motion and neck supple.     Neurological: He is alert and oriented to person, place, and time. He has normal strength.   Skin: Skin is warm and dry. Capillary refill takes less than 2 seconds. No rash noted. No erythema.         ED Course   Critical Care    Date/Time: 4/3/2025 3:00 PM    Performed by: David Leonardo MD  Authorized by: David Leonardo MD  Direct patient critical care time: 20 minutes  Additional history critical care time: 10 minutes  Ordering / reviewing critical care time: 5 minutes  Documentation critical care time: 5 minutes  Total critical care time (exclusive of procedural time) : 40 minutes  Critical care time was exclusive of separately billable procedures and treating other patients and teaching time.  Critical care was necessary to treat or prevent imminent or life-threatening deterioration of the following conditions: shock, circulatory failure and sepsis.  Critical care was time spent personally by me on the following activities: development of treatment plan with patient or surrogate, evaluation of patient's response to treatment, examination of patient, obtaining history from patient or surrogate, ordering and performing treatments and interventions, ordering and review of laboratory studies, ordering and review of radiographic studies, re-evaluation of patient's condition, review of old charts and pulse oximetry.        Labs Reviewed   COMPREHENSIVE METABOLIC PANEL - Abnormal       Result Value    Sodium 137      Potassium 5.0      Chloride 104      CO2 18 (*)     Glucose 110      BUN 27 (*)     Creatinine 1.0      Calcium 9.4      Protein Total 8.3      Albumin 3.9       Bilirubin Total 0.8      ALP 69      AST 33      ALT 29      Anion Gap 15      eGFR >60      Narrative:     Specimen moderately hemolyzed.   URINALYSIS, REFLEX TO URINE CULTURE - Abnormal    Color, UA Yellow      Appearance, UA Clear      pH, UA 6.0      Spec Grav UA 1.025      Protein, UA Trace (*)     Glucose, UA Negative      Ketones, UA Negative      Bilirubin, UA Negative      Blood, UA Negative      Nitrites, UA Negative      Urobilinogen, UA Negative      Leukocyte Esterase, UA Negative     LACTIC ACID, PLASMA - Abnormal    Lactic Acid Level 3.6 (*)     Narrative:     Falsely low lactic acid results can be found in samples containing >=13.0 mg/dL total bilirubin and/or >=3.5 mg/dL direct bilirubin. Slightly hemolyzed   B-TYPE NATRIURETIC PEPTIDE - Abnormal     (*)    LIPASE - Abnormal    Lipase Level 92 (*)    PROCALCITONIN - Abnormal    Procalcitonin 0.33 (*)    D DIMER, QUANTITATIVE - Abnormal    D-Dimer 1.55 (*)    TSH - Abnormal    TSH 0.143 (*)    CBC WITH DIFFERENTIAL - Abnormal    WBC 15.29 (*)     RBC 5.72      HGB 16.1      HCT 47.9      MCV 84      MCH 28.1      MCHC 33.6      RDW 15.0 (*)     Platelet Count 293      MPV 10.2      Nucleated RBC 0      Neut % 90.5 (*)     Lymph % 2.3 (*)     Mono % 3.6 (*)     Eos % 2.9      Basophil % 0.2      Imm Grans % 0.5      Neut # 13.83 (*)     Lymph # 0.35 (*)     Mono # 0.55      Eos # 0.45      Baso # 0.03      Imm Grans # 0.08 (*)    LACTIC ACID, PLASMA - Abnormal    Lactic Acid Level 2.5 (*)     Narrative:     Falsely low lactic acid results can be found in samples containing >=13.0 mg/dL total bilirubin and/or >=3.5 mg/dL direct bilirubin.    DRUG SCREEN PANEL, URINE EMERGENCY - Abnormal    Benzodiazepine, Urine Negative      Methadone, Urine Negative      Cocaine, Urine Negative      Opiates, Urine Presumptive Positive (*)     Barbiturates, Urine Negative      Amphetamines, Urine Negative      THC Negative      Phencyclidine, Urine Negative  "     Urine Creatinine 96.1      Narrative:     This screen includes the following classes of drugs at the listed cut-off:     Benzodiazepines:        200 ng/ml   Methadone:              300 ng/ml   Cocaine metabolite:     300 ng/ml   Opiates:                300 ng/ml   Barbiturates:           200 ng/ml   Amphetamines:           1000 ng/ml   Marijuana metabs (THC): 50 ng/ml   Phencyclidine (PCP):    25 ng/ml     This is a screening test. If results do not correlate with clinical presentation, then a confirmatory send out test is advised.    This report is intended for use in clinical monitoring and management of patients. It is not intended for use in employment related drug testing."   ISTAT PROCEDURE - Abnormal    POC PH 7.376      POC PCO2 43.9      POC PO2 33 (*)     POC HCO3 25.7      POC BE 0      POC SATURATED O2 61      POC TCO2 27      Sample VENOUS      Site Other      Allens Test N/A      DelSys Nasal Can      Mode SPONT      Flow 3      Sp02 99     MAGNESIUM - Normal    Magnesium  1.8     PHOSPHORUS - Normal    Phosphorus Level 2.9     APTT - Normal    PTT 21.5     PROTIME-INR - Normal    PT 11.2      INR 1.0     TROPONIN I - Normal    Troponin-I 0.015     BETA - HYDROXYBUTYRATE, SERUM - Normal    Beta-Hydroxybutyrate 0.2     LACTIC ACID, PLASMA - Normal    Lactic Acid Level 1.8      Narrative:     Falsely low lactic acid results can be found in samples containing >=13.0 mg/dL total bilirubin and/or >=3.5 mg/dL direct bilirubin.   Specimen slightly hemolyzed.   ALCOHOL,MEDICAL (ETHANOL) - Normal    Alcohol, Serum <10     CULTURE, BLOOD   CULTURE, BLOOD   CBC W/ AUTO DIFFERENTIAL    Narrative:     The following orders were created for panel order CBC auto differential.  Procedure                               Abnormality         Status                     ---------                               -----------         ------                     CBC with Differential[[id number]]       Abnormal            Final " result                 Please view results for these tests on the individual orders.   POCT INFLUENZA A/B MOLECULAR    POC Molecular Influenza A Ag Negative      POC Molecular Influenza B Ag Negative       Acceptable Yes     SARS-COV-2 RDRP GENE    POC Rapid COVID Negative       Acceptable Yes     POCT GLUCOSE, HAND-HELD DEVICE          Imaging Results              CT Abdomen Pelvis With IV Contrast NO Oral Contrast (Final result)  Result time 04/03/25 17:42:30      Final result by Brody Mejia MD (04/03/25 17:42:30)                   Impression:      1. Mild wall thickening with relative increased mucosal enhancement involving the gastric antral and pyloric region which can be seen in the setting of acute gastritis.  2. No additional acute intra-abdominal abnormalities identified.  3. Chronic interstitial lung disease/pulmonary fibrosis.  4. Multiple additional findings as detailed above.      Electronically signed by: Brody Mejia MD  Date:    04/03/2025  Time:    17:42               Narrative:    EXAMINATION:  CT ABDOMEN PELVIS WITH IV CONTRAST    CLINICAL HISTORY:  Epigastric pain;    TECHNIQUE:  Low dose axial images, sagittal and coronal reformations were obtained from the lung bases to the pubic symphysis following the IV administration of 75 mL of Omnipaque 350 .  Oral contrast was not given.    COMPARISON:  CT abdomen and pelvis from April 2024.    FINDINGS:  Heart is normal in size.  Coronary artery calcification is seen.  Diffuse chronic interstitial lung disease/pulmonary fibrosis is seen in the visualized lungs.  No focal consolidation or pleural effusion.    No significant hepatic abnormalities are identified.  There is no intra-or extrahepatic biliary ductal dilatation.  The gallbladder is unremarkable.  The stomach is distended with fluid and ingested material.  Mild wall thickening with relative increased mucosal enhancement is seen involving the gastric antral  and pyloric region.  Pancreas, spleen, and adrenal glands are unremarkable.    Kidneys enhance normally with no evidence of hydronephrosis.  Single nonobstructing right renal stone is seen.  Numerous bilateral renal cysts are seen, the largest of which measures 6.5 cm on the left.  No abnormalities are appreciated along the ureteral courses.  Urinary bladder is nondistended.  Prostate appears within normal limits in size.    Appendix is visualized and is unremarkable.  The visualized loops of small and large bowel show no evidence of obstruction or inflammation.  Moderate volume retained stool is seen throughout the colon.  No free air or free fluid.    Aorta tapers normally.    No acute osseous abnormality identified. Subcutaneous soft tissues show no significant abnormalities.                                       X-Ray Chest AP Portable (Final result)  Result time 04/03/25 17:10:34      Final result by Brody Mejia MD (04/03/25 17:10:34)                   Impression:      Chronic interstitial lung disease/pulmonary fibrosis.  No superimposed acute cardiopulmonary process identified.  No significant change.      Electronically signed by: Brody Mejia MD  Date:    04/03/2025  Time:    17:10               Narrative:    EXAMINATION:  XR CHEST AP PORTABLE    CLINICAL HISTORY:  Sepsis;    TECHNIQUE:  Single frontal view of the chest was performed.    COMPARISON:  03/30/2025.    FINDINGS:  Cardiac silhouette is normal in size.  Lungs are hypoinflated.  There is diffuse chronic interstitial lung disease/pulmonary fibrosis.  No evidence of new focal consolidative process, pneumothorax, or significant pleural effusion.  No acute osseous abnormality identified.                                       Medications   amLODIPine tablet 10 mg (has no administration in time range)   arformoteroL nebulizer solution 15 mcg (has no administration in time range)   aspirin chewable tablet 81 mg (has no administration in time  range)   budesonide nebulizer solution 1 mg (has no administration in time range)   dextromethorphan-guaiFENesin  mg/5 ml liquid 10 mL (has no administration in time range)   famotidine tablet 20 mg (has no administration in time range)   furosemide tablet 20 mg (has no administration in time range)   latanoprost 0.005 % ophthalmic solution 1 drop (has no administration in time range)   metoprolol succinate (TOPROL-XL) 24 hr tablet 50 mg (has no administration in time range)   oxybutynin 24 hr tablet 5 mg (has no administration in time range)   mycophenolate capsule 1,000 mg (has no administration in time range)   nintedanib Cap 150 mg (has no administration in time range)   pantoprazole EC tablet 40 mg (has no administration in time range)   sacubitriL-valsartan 24-26 mg per tablet 1 tablet (has no administration in time range)   sucralfate tablet 1 g (has no administration in time range)   sodium chloride 0.9% flush 10 mL (has no administration in time range)   melatonin tablet 6 mg (has no administration in time range)   ondansetron injection 4 mg (has no administration in time range)   prochlorperazine injection Soln 5 mg (has no administration in time range)   senna-docusate 8.6-50 mg per tablet 1 tablet (has no administration in time range)   bisacodyL suppository 10 mg (has no administration in time range)   acetaminophen tablet 650 mg (has no administration in time range)   simethicone chewable tablet 80 mg (has no administration in time range)   aluminum-magnesium hydroxide-simethicone 200-200-20 mg/5 mL suspension 30 mL (has no administration in time range)   acetaminophen tablet 650 mg (has no administration in time range)   naloxone 0.4 mg/mL injection 0.02 mg (has no administration in time range)   potassium bicarbonate disintegrating tablet 50 mEq (has no administration in time range)   potassium bicarbonate disintegrating tablet 35 mEq (has no administration in time range)   potassium  bicarbonate disintegrating tablet 60 mEq (has no administration in time range)   magnesium oxide tablet 800 mg (has no administration in time range)   magnesium oxide tablet 800 mg (has no administration in time range)   potassium, sodium phosphates 280-160-250 mg packet 2 packet (has no administration in time range)   potassium, sodium phosphates 280-160-250 mg packet 2 packet (has no administration in time range)   potassium, sodium phosphates 280-160-250 mg packet 2 packet (has no administration in time range)   glucose chewable tablet 16 g (has no administration in time range)   glucose chewable tablet 24 g (has no administration in time range)   glucagon (human recombinant) injection 1 mg (has no administration in time range)   enoxaparin injection 40 mg (has no administration in time range)   insulin aspart U-100 pen 0-5 Units (has no administration in time range)   methylPREDNISolone sodium succinate injection 125 mg (has no administration in time range)   predniSONE tablet 20 mg (has no administration in time range)     Followed by   predniSONE tablet 10 mg (has no administration in time range)     Followed by   predniSONE tablet 5 mg (has no administration in time range)   morphine injection 4 mg (4 mg Intravenous Given 4/3/25 1509)   ondansetron injection 4 mg (4 mg Intravenous Given 4/3/25 1510)   sodium chloride 0.9% bolus 500 mL 500 mL (0 mLs Intravenous Stopped 4/3/25 1610)   vancomycin 1,250 mg in 0.9% NaCl 250 mL IVPB (admixture device) (0 mg Intravenous Stopped 4/3/25 1940)   ceFEPIme injection 1 g (1 g Intravenous Given 4/3/25 1725)   iohexoL (OMNIPAQUE 350) injection 75 mL (75 mLs Intravenous Given 4/3/25 1713)   diphenhydrAMINE injection 25 mg (25 mg Intravenous Given 4/3/25 1755)   sucralfate 100 mg/mL suspension 1 g (1 g Oral Given 4/3/25 2001)   LORazepam injection 1 mg (1 mg Intravenous Given 4/3/25 2001)     Medical Decision Making  Amount and/or Complexity of Data Reviewed  External Data  Reviewed: radiology and notes.     Details: See HPI.  Labs: ordered. Decision-making details documented in ED Course.  Radiology: ordered. Decision-making details documented in ED Course.  ECG/medicine tests: ordered and independent interpretation performed. Decision-making details documented in ED Course.    Risk  Prescription drug management.  Decision regarding hospitalization.      MDM:    74-year-old male with past medical history as noted above presenting with shortness of breath, abdominal pain.  Differential Diagnosis includes: PE, pneumothorax, acute mi/ACS, pancreatitis.  Physical exam as noted above.  ED workup notable for EKG showing sinus tachycardia rate of 141 beats per minute, minimal voltage criteria for LVH, nonspecific ST and T-wave changes noted throughout, no STEMI.  UDS positive for opiates, alcohol negative, CBC white blood cell count 15.29, hemoglobin 16.1, chest x-ray shows some chronic appearing findings, TSH 0.143, D-dimer 1.55, beta hydroxybutyrate 0.2, procalcitonin 0.33, troponin 0.015, lipase 92, BNP 1 1 5, INR 1.0, lactate 3.6/2.5, flu and COVID negative, CMP with BUN 27, creatinine 1.0.  CT abdomen pelvis shows some findings concerning for acute gastritis with wall thickening in the gastric antral and pyloric region.  Patient presentation appears unclear at this point with persistent tachypnea and tachycardia despite a relatively unremarkable workup today.  Treated symptomatically with broad-spectrum antibiotics given initially with elevated procalcitonin, significant tachycardia and lactic acidosis.  He appears slightly improved compared to his presentation, given unclear pathology at this point will admit to Hospital Medicine team for further evaluation and management.  He appears stable at this time for transfer to the floor.    Note was created using voice recognition software. Note may have occasional typographical or grammatical errors, garbled syntax, and other bizarre  constructions that may not have been identified and edited despite good doretha initial review prior to signing.             Scribe Attestation:   Scribe #1: I performed the above scribed service and the documentation accurately describes the services I performed. I attest to the accuracy of the note.                             I, David Leonardo M.D., personally performed the services described in this documentation. All medical record entries made by the scribe were at my direction and in my presence. I have reviewed the chart and agree that the record reflects my personal performance and is accurate and complete.      DISCLAIMER: This note was prepared with [employer] voice recognition transcription software. Garbled syntax, mangled pronouns, and other bizarre constructions may be attributed to that software system.    Clinical Impression:  Final diagnoses:  [Z13.6] Screening for cardiovascular condition  [R00.0] Tachycardia          ED Disposition Condition    Admit                     [1]   Social History  Tobacco Use    Smoking status: Never     Passive exposure: Never    Smokeless tobacco: Never   Substance Use Topics    Alcohol use: No    Drug use: Never        David Leonardo MD  04/03/25 [id number]

## 2025-04-04 LAB
ABSOLUTE EOSINOPHIL (OHS): 0.01 K/UL
ABSOLUTE MONOCYTE (OHS): 0.07 K/UL (ref 0.3–1)
ABSOLUTE NEUTROPHIL COUNT (OHS): 7.6 K/UL (ref 1.8–7.7)
ADENOVIRUS: NOT DETECTED
ALBUMIN SERPL BCP-MCNC: 2.9 G/DL (ref 3.5–5.2)
ALP SERPL-CCNC: 50 UNIT/L (ref 40–150)
ALT SERPL W/O P-5'-P-CCNC: 19 UNIT/L (ref 10–44)
ANION GAP (OHS): 9 MMOL/L (ref 8–16)
ASCENDING AORTA: 3.33 CM
AST SERPL-CCNC: 15 UNIT/L (ref 11–45)
AV INDEX (PROSTH): 0.87
AV MEAN GRADIENT: 1 MMHG
AV PEAK GRADIENT: 2 MMHG
AV VALVE AREA BY VELOCITY RATIO: 3 CM²
AV VALVE AREA: 3 CM²
AV VELOCITY RATIO: 0.86
BASOPHILS # BLD AUTO: 0.01 K/UL
BASOPHILS NFR BLD AUTO: 0.1 %
BILIRUB SERPL-MCNC: 0.6 MG/DL (ref 0.1–1)
BORDETELLA PARAPERTUSSIS (IS1001): NOT DETECTED
BORDETELLA PERTUSSIS (PTXP): NOT DETECTED
BSA FOR ECHO PROCEDURE: 1.67 M2
BUN SERPL-MCNC: 21 MG/DL (ref 8–23)
CALCIUM SERPL-MCNC: 7.9 MG/DL (ref 8.7–10.5)
CHLAMYDIA PNEUMONIAE: NOT DETECTED
CHLORIDE SERPL-SCNC: 103 MMOL/L (ref 95–110)
CO2 SERPL-SCNC: 23 MMOL/L (ref 23–29)
CORONAVIRUS 229E, COMMON COLD VIRUS: NOT DETECTED
CORONAVIRUS HKU1, COMMON COLD VIRUS: NOT DETECTED
CORONAVIRUS NL63, COMMON COLD VIRUS: NOT DETECTED
CORONAVIRUS OC43, COMMON COLD VIRUS: NOT DETECTED
CREAT SERPL-MCNC: 0.7 MG/DL (ref 0.5–1.4)
CV ECHO LV RWT: 0.4 CM
DOP CALC AO PEAK VEL: 0.7 M/S
DOP CALC AO VTI: 12.3 CM
DOP CALC LVOT AREA: 3.5 CM2
DOP CALC LVOT DIAMETER: 2.1 CM
DOP CALC LVOT PEAK VEL: 0.6 M/S
DOP CALC LVOT STROKE VOLUME: 37 CM3
DOP CALCLVOT PEAK VEL VTI: 10.7 CM
E WAVE DECELERATION TIME: 60 MSEC
E/A RATIO: 0.21
ECHO LV POSTERIOR WALL: 0.8 CM (ref 0.6–1.1)
ERYTHROCYTE [DISTWIDTH] IN BLOOD BY AUTOMATED COUNT: 14.9 % (ref 11.5–14.5)
FLUBV RNA NPH QL NAA+NON-PROBE: NOT DETECTED
FRACTIONAL SHORTENING: 20 % (ref 28–44)
GFR SERPLBLD CREATININE-BSD FMLA CKD-EPI: >60 ML/MIN/1.73/M2
GLUCOSE SERPL-MCNC: 205 MG/DL (ref 70–110)
HCT VFR BLD AUTO: 39.7 % (ref 40–54)
HGB BLD-MCNC: 13 GM/DL (ref 14–18)
HPIV1 RNA NPH QL NAA+NON-PROBE: NOT DETECTED
HPIV2 RNA NPH QL NAA+NON-PROBE: NOT DETECTED
HPIV3 RNA NPH QL NAA+NON-PROBE: NOT DETECTED
HPIV4 RNA NPH QL NAA+NON-PROBE: NOT DETECTED
HUMAN METAPNEUMOVIRUS: NOT DETECTED
IMM GRANULOCYTES # BLD AUTO: 0.04 K/UL (ref 0–0.04)
IMM GRANULOCYTES NFR BLD AUTO: 0.5 % (ref 0–0.5)
INFLUENZA A: NOT DETECTED
INTERVENTRICULAR SEPTUM: 0.7 CM (ref 0.6–1.1)
IVC DIAMETER: 1.54 CM
IVRT: 114 MSEC
LA MAJOR: 4.6 CM
LA MINOR: 4.6 CM
LA WIDTH: 3.7 CM
LEFT ATRIUM SIZE: 2.7 CM
LEFT ATRIUM VOLUME INDEX: 23 ML/M2
LEFT ATRIUM VOLUME: 39 CM3
LEFT INTERNAL DIMENSION IN SYSTOLE: 3.2 CM (ref 2.1–4)
LEFT VENTRICLE DIASTOLIC VOLUME INDEX: 42.6 ML/M2
LEFT VENTRICLE DIASTOLIC VOLUME: 72 ML
LEFT VENTRICLE MASS INDEX: 50.8 G/M2
LEFT VENTRICLE SYSTOLIC VOLUME INDEX: 24.9 ML/M2
LEFT VENTRICLE SYSTOLIC VOLUME: 42 ML
LEFT VENTRICULAR INTERNAL DIMENSION IN DIASTOLE: 4 CM (ref 3.5–6)
LEFT VENTRICULAR MASS: 85.8 G
LV LATERAL E/E' RATIO: 6 M/S
LVED V (TEICH): 71.87 ML
LVES V (TEICH): 42.08 ML
LVOT MG: 1.07 MMHG
LVOT MV: 0.49 CM/S
LYMPHOCYTES # BLD AUTO: 0.4 K/UL (ref 1–4.8)
MAGNESIUM SERPL-MCNC: 1.6 MG/DL (ref 1.6–2.6)
MCH RBC QN AUTO: 27.5 PG (ref 27–31)
MCHC RBC AUTO-ENTMCNC: 32.7 G/DL (ref 32–36)
MCV RBC AUTO: 84 FL (ref 82–98)
MV PEAK A VEL: 1.15 M/S
MV PEAK E VEL: 0.24 M/S
MV STENOSIS PRESSURE HALF TIME: 17.49 MS
MV VALVE AREA P 1/2 METHOD: 12.58 CM2
MYCOPLASMA PNEUMONIAE: NOT DETECTED
NUCLEATED RBC (/100WBC) (OHS): 0 /100 WBC
OHS CV RV/LV RATIO: 0.85 CM
OHS QRS DURATION: 80 MS
OHS QTC CALCULATION: 385 MS
PHOSPHATE SERPL-MCNC: 2.7 MG/DL (ref 2.7–4.5)
PISA TR MAX VEL: 1.1 M/S
PLATELET # BLD AUTO: 212 K/UL (ref 150–450)
PMV BLD AUTO: 10.7 FL (ref 9.2–12.9)
POCT GLUCOSE: 141 MG/DL (ref 70–110)
POCT GLUCOSE: 230 MG/DL (ref 70–110)
POCT GLUCOSE: 235 MG/DL (ref 70–110)
POCT GLUCOSE: 249 MG/DL (ref 70–110)
POTASSIUM SERPL-SCNC: 4.5 MMOL/L (ref 3.5–5.1)
PROT SERPL-MCNC: 5.9 GM/DL (ref 6–8.4)
PV PEAK GRADIENT: 1 MMHG
PV PEAK VELOCITY: 0.52 M/S
RA MAJOR: 3.75 CM
RA PRESSURE ESTIMATED: 8 MMHG
RA WIDTH: 3 CM
RBC # BLD AUTO: 4.73 M/UL (ref 4.6–6.2)
RELATIVE EOSINOPHIL (OHS): 0.1 %
RELATIVE LYMPHOCYTE (OHS): 4.9 % (ref 18–48)
RELATIVE MONOCYTE (OHS): 0.9 % (ref 4–15)
RELATIVE NEUTROPHIL (OHS): 93.5 % (ref 38–73)
RESPIRATORY INFECTION PANEL SOURCE: NORMAL
RIGHT VENTRICLE DIASTOLIC BASEL DIMENSION: 3.4 CM
RIGHT VENTRICULAR END-DIASTOLIC DIMENSION: 3.35 CM
RSV RNA NPH QL NAA+NON-PROBE: NOT DETECTED
RV TB RVSP: 9 MMHG
RV TISSUE DOPPLER FREE WALL SYSTOLIC VELOCITY 1 (APICAL 4 CHAMBER VIEW): 16.85 CM/S
RV+EV RNA NPH QL NAA+NON-PROBE: NOT DETECTED
SARS-COV-2 RNA RESP QL NAA+PROBE: NOT DETECTED
SINUS: 3.82 CM
SODIUM SERPL-SCNC: 135 MMOL/L (ref 136–145)
STJ: 3.02 CM
TDI LATERAL: 0.04 M/S
TR MAX PG: 5 MMHG
TRICUSPID ANNULAR PLANE SYSTOLIC EXCURSION: 1.3 CM
TV REST PULMONARY ARTERY PRESSURE: 13 MMHG
WBC # BLD AUTO: 8.13 K/UL (ref 3.9–12.7)
Z-SCORE OF LEFT VENTRICULAR DIMENSION IN END DIASTOLE: -1.63
Z-SCORE OF LEFT VENTRICULAR DIMENSION IN END SYSTOLE: 0.74

## 2025-04-04 PROCEDURE — 1158F ADVNC CARE PLAN TLK DOCD: CPT | Mod: HCNC,CPTII,, | Performed by: INTERNAL MEDICINE

## 2025-04-04 PROCEDURE — 0202U NFCT DS 22 TRGT SARS-COV-2: CPT | Mod: HCNC

## 2025-04-04 PROCEDURE — 80053 COMPREHEN METABOLIC PANEL: CPT | Mod: HCNC | Performed by: STUDENT IN AN ORGANIZED HEALTH CARE EDUCATION/TRAINING PROGRAM

## 2025-04-04 PROCEDURE — 63600175 PHARM REV CODE 636 W HCPCS: Mod: HCNC | Performed by: STUDENT IN AN ORGANIZED HEALTH CARE EDUCATION/TRAINING PROGRAM

## 2025-04-04 PROCEDURE — 21400001 HC TELEMETRY ROOM: Mod: HCNC

## 2025-04-04 PROCEDURE — 25000003 PHARM REV CODE 250: Mod: HCNC | Performed by: STUDENT IN AN ORGANIZED HEALTH CARE EDUCATION/TRAINING PROGRAM

## 2025-04-04 PROCEDURE — 25000242 PHARM REV CODE 250 ALT 637 W/ HCPCS: Mod: HCNC | Performed by: STUDENT IN AN ORGANIZED HEALTH CARE EDUCATION/TRAINING PROGRAM

## 2025-04-04 PROCEDURE — 94761 N-INVAS EAR/PLS OXIMETRY MLT: CPT | Mod: HCNC

## 2025-04-04 PROCEDURE — 99497 ADVNCD CARE PLAN 30 MIN: CPT | Mod: HCNC,25,, | Performed by: REGISTERED NURSE

## 2025-04-04 PROCEDURE — 36415 COLL VENOUS BLD VENIPUNCTURE: CPT | Mod: HCNC | Performed by: STUDENT IN AN ORGANIZED HEALTH CARE EDUCATION/TRAINING PROGRAM

## 2025-04-04 PROCEDURE — 27000221 HC OXYGEN, UP TO 24 HOURS: Mod: HCNC

## 2025-04-04 PROCEDURE — 99223 1ST HOSP IP/OBS HIGH 75: CPT | Mod: HCNC,,, | Performed by: INTERNAL MEDICINE

## 2025-04-04 PROCEDURE — 63600175 PHARM REV CODE 636 W HCPCS: Mod: HCNC | Performed by: INTERNAL MEDICINE

## 2025-04-04 PROCEDURE — 63600175 PHARM REV CODE 636 W HCPCS: Mod: HCNC

## 2025-04-04 PROCEDURE — 85025 COMPLETE CBC W/AUTO DIFF WBC: CPT | Mod: HCNC | Performed by: STUDENT IN AN ORGANIZED HEALTH CARE EDUCATION/TRAINING PROGRAM

## 2025-04-04 PROCEDURE — 83735 ASSAY OF MAGNESIUM: CPT | Mod: HCNC | Performed by: STUDENT IN AN ORGANIZED HEALTH CARE EDUCATION/TRAINING PROGRAM

## 2025-04-04 PROCEDURE — 84100 ASSAY OF PHOSPHORUS: CPT | Mod: HCNC | Performed by: STUDENT IN AN ORGANIZED HEALTH CARE EDUCATION/TRAINING PROGRAM

## 2025-04-04 PROCEDURE — 94640 AIRWAY INHALATION TREATMENT: CPT | Mod: HCNC

## 2025-04-04 PROCEDURE — 99223 1ST HOSP IP/OBS HIGH 75: CPT | Mod: HCNC,,, | Performed by: REGISTERED NURSE

## 2025-04-04 PROCEDURE — 99900035 HC TECH TIME PER 15 MIN (STAT): Mod: HCNC

## 2025-04-04 RX ORDER — FUROSEMIDE 10 MG/ML
40 INJECTION INTRAMUSCULAR; INTRAVENOUS DAILY
Status: DISCONTINUED | OUTPATIENT
Start: 2025-04-04 | End: 2025-04-08 | Stop reason: HOSPADM

## 2025-04-04 RX ORDER — FUROSEMIDE 10 MG/ML
40 INJECTION INTRAMUSCULAR; INTRAVENOUS EVERY 12 HOURS
Status: DISCONTINUED | OUTPATIENT
Start: 2025-04-04 | End: 2025-04-04

## 2025-04-04 RX ORDER — CEFTRIAXONE 2 G/1
2 INJECTION, POWDER, FOR SOLUTION INTRAMUSCULAR; INTRAVENOUS
Status: DISCONTINUED | OUTPATIENT
Start: 2025-04-04 | End: 2025-04-08 | Stop reason: HOSPADM

## 2025-04-04 RX ADMIN — INSULIN ASPART 2 UNITS: 100 INJECTION, SOLUTION INTRAVENOUS; SUBCUTANEOUS at 12:04

## 2025-04-04 RX ADMIN — SUCRALFATE 1 G: 1 TABLET ORAL at 09:04

## 2025-04-04 RX ADMIN — MYCOPHENOLATE MOFETIL 1000 MG: 250 CAPSULE ORAL at 08:04

## 2025-04-04 RX ADMIN — ARFORMOTEROL TARTRATE 15 MCG: 15 SOLUTION RESPIRATORY (INHALATION) at 08:04

## 2025-04-04 RX ADMIN — SUCRALFATE 1 G: 1 TABLET ORAL at 06:04

## 2025-04-04 RX ADMIN — METOPROLOL SUCCINATE 50 MG: 50 TABLET, EXTENDED RELEASE ORAL at 08:04

## 2025-04-04 RX ADMIN — BUDESONIDE 0.5 MG: 0.5 INHALANT RESPIRATORY (INHALATION) at 08:04

## 2025-04-04 RX ADMIN — ENOXAPARIN SODIUM 40 MG: 40 INJECTION SUBCUTANEOUS at 06:04

## 2025-04-04 RX ADMIN — FUROSEMIDE 20 MG: 20 TABLET ORAL at 08:04

## 2025-04-04 RX ADMIN — INSULIN ASPART 2 UNITS: 100 INJECTION, SOLUTION INTRAVENOUS; SUBCUTANEOUS at 08:04

## 2025-04-04 RX ADMIN — FUROSEMIDE 40 MG: 10 INJECTION, SOLUTION INTRAVENOUS at 02:04

## 2025-04-04 RX ADMIN — ASPIRIN 81 MG CHEWABLE TABLET 81 MG: 81 TABLET CHEWABLE at 08:04

## 2025-04-04 RX ADMIN — PANTOPRAZOLE SODIUM 40 MG: 40 TABLET, DELAYED RELEASE ORAL at 08:04

## 2025-04-04 RX ADMIN — SACUBITRIL AND VALSARTAN 1 TABLET: 24; 26 TABLET, FILM COATED ORAL at 08:04

## 2025-04-04 RX ADMIN — OXYBUTYNIN CHLORIDE 5 MG: 5 TABLET, EXTENDED RELEASE ORAL at 08:04

## 2025-04-04 RX ADMIN — SACUBITRIL AND VALSARTAN 1 TABLET: 24; 26 TABLET, FILM COATED ORAL at 09:04

## 2025-04-04 RX ADMIN — AMLODIPINE BESYLATE 10 MG: 5 TABLET ORAL at 08:04

## 2025-04-04 RX ADMIN — MYCOPHENOLATE MOFETIL 1000 MG: 250 CAPSULE ORAL at 09:04

## 2025-04-04 RX ADMIN — BUDESONIDE 1 MG: 0.5 INHALANT RESPIRATORY (INHALATION) at 08:04

## 2025-04-04 RX ADMIN — CEFTRIAXONE SODIUM 2 G: 2 INJECTION, POWDER, FOR SOLUTION INTRAMUSCULAR; INTRAVENOUS at 06:04

## 2025-04-04 RX ADMIN — PREDNISONE 20 MG: 20 TABLET ORAL at 08:04

## 2025-04-04 NOTE — CONSULTS
[facility] - Emergency Dept  Palliative Medicine  Consult Note    Patient Name: Emmanuel Grant  MRN: [id number]  Admission Date: 4/3/2025  Hospital Length of Stay: 1 days  Code Status: Full Code   Attending Provider: Blanquita Hunt MD  Consulting Provider: Janis Glaser NP  Primary Care Physician: Wilfredo De Souza MD  Principal Problem:IPF (idiopathic pulmonary fibrosis)    Patient information was obtained from patient, relative(s), past medical records, ER records, and primary team.      Inpatient consult to Palliative Care  Consult performed by: Janis Glaser NP  Consult ordered by: Noe Rey MD  Reason for consult: goals of care and advanced care planning        Assessment/Plan:   Palliative Care  Advance Care Planning   4/4/2025 - Consult   - consult received; interval chart reviewed in detail  - pt known to myself and palliative medicine team from previous admission earlier this week   - met with patient at bedside; introduction to palliative medicine team and role in current care and admission   - pt lives independently at home; he is not  and his 3 sons live out of state; his independence is very important to him and continuing to maintain independence was a priority in discussions this and previous consults   - given nature of pt's condition and lack of respiratory reserve, shared concern for pt continuing to drive with pt and son  - was joined by Dr. Urena [facility] for pulm/medical update and GOC/ACP discussion   - pt confirms that his son, June Grant Jr, is a physician and his preferred MPOA; in previous admission pt identified his brother as best contact/preferred MPOA but pt sites that he understands his son should be included in what is going on with him and would be best to assist  - reviewed code status; sited pt's previous code status discussion last admission where he expressed understanding that intubation/ventilator would not be beneficial for him, but we planned to discuss  cardiac resuscitation further; Ayanna Toney provided detailed explanation of pt's condition and reasons why DNR would be very appropriate for pt   - pt agrees that he feels DNR would likely be most inline with his overall wishes he is just very fearful of dying and wants to live as long as possible; pt to think about it more and Dr. Urena plans to follow up 4/5 on this; shared with Dr. Urena and Dr. Hunt that including pt's son in this formal decision is advised as pt did have some AMS on admission, which is improving)   - reviewed recommendation of hospice and philosophy of care of hospice; reviewed potential services and plan of care for pt based on his specific needs   - Dr. Urena in agreement with recommendation for hospice as option for optimal symptom management and safe plan for pt living independently, and encouraged consideration to pt (later discussed with Dr. Hunt, who is also in agreement with this plan)   - pt agrees that hospice does sound like a good option for him and is agreeable to me discussing this with his son   - following visit called pt's son Emmanuel Grant Jr; provided medical update for this and prior admissions; son aware of overall clinical status and physical limitations but confirms that pt has not been truthful with severity, symptom burden, or frequency of hospitalizations; shares that they have encouraged pt to allow for his relocation to [place] with his sons, but pt has not been agreeable  - discussed MDT recommendation for DNR and option for home hospice as a means of providing safe and appropriate level care for pt while respecting his wishes to be at home; son interested in this plan; ensured that MDT would cont to provide updates and include him on decisions  - recommended to HM and CM that a hospice information visit is coordinated when pt's mental status is baseline and when son can join by phone  - emotional support provided to pt and son during interactions   - Allowed time for  questions/concerns; all addressed; expressed availability of myself/palliative team for additional questions/concerns   - updated MDT     Neuro  H/O: CVA (cerebrovascular accident)  - chronic history noted     Pulmonary  * IPF (idiopathic pulmonary fibrosis)  - pt with long term hx of IPF/ILD; pt and son with good insight into pt's condition   - discussed trajectory of life limiting condition with pt, along with Dr. Urena PCCM; also discussed with son by phone   - pt is consistently requiring hospital level care related to IPF on an increasing basis  - hospice qualifying; explained to pt that hospice would be a way to being care to him and allow for symptom management; pt with very limited reserve that is now inhibiting him from even making it to OP appts (See DM)   - PCCM following; Dr. Urena reviewed past and current CT imaging with pt, pt verbalized being able to see the worsening in the imaging   - cont mgmt her HM and PCCM     Chronic hypoxemic respiratory failure  - see IPF     Cardiac/Vascular  Chronic systolic congestive heart failure  - chronic condition noted  - contributes to pt's overall condition and hospice qualification; contributes to pt's lack of reserve   - echo's have been relatively stable with current EF 50-45%  - discussed trajectory of life-limiting condition     Endocrine  Type 2 diabetes mellitus without complication, without long-term current use of insulin  - chronic condition noted; was attempting OP follow up with endocrinologist when exacerbation of symptoms lead to her assisting with admission to ED (see endocrinology note 4/3)   - pt has responded well to steroid treatment in the past and would likely be beneficial in the future, even if agreeable to hospice; assistance of hospice would also help with need for adjustment in DM regimen if needed during periods of steroid use   - cont mgmt per HM     Thank you for your consult. I will follow-up with patient. Please contact us if you have  "any additional questions.    Subjective:     HPI:   From H&P: "This is a 73-year-old male with a past medical history of ILD/pulmonary fibrosis (on 3L O2), CAD, HFrEF (EF: 40%), hypertension, type 2 diabetes, hyperlipidemia, CVA, BPH who presents with shortness of breath.      Patient presented to endocrinology clinic for routine follow-up, and was noted to be dyspneic.  He was referred to the ED for further evaluation. On my examination, patient was altered after receiving ativan, but reportedly he had worsening dyspnea, dry cough and abdominal pain. Of note, the patient had 3 hospitalizations over the last month: 1) 3/16-3/17, for ACS r/o, 2) 3/23-3/25 for acute on chronic hypoxia in the setting of ILD/HF, 3) 3/29-4/1 for ILD flare.      In the ED, the patient was tachycardic (120s-140s), tachypneic (20s-40s), was placed on 3 L O2.  Labs were remarkable for leukocytosis (15.2), elevated D-dimer (1.55), elevated BNP (115), elevated lactic acid (3.6  > 1.8), elevated procalcitonin (0.33), low TSH (0.143).  UDS was positive for opioids.  UA was unremarkable.  Chest x-ray showed chronic interstitial appearance, with no acute process.  CT abdomen and pelvis showed mild wall thickening/increased mucosal enhancement involving the gastric antral and pyloric regions (possible acute gastritis).  Patient was given 500 mL of NS, vancomycin, cefepime, Benadryl 25 mg IV, Ativan 1 mg IV, morphine 4 mg IV, Zofran 4 mg IV, Carafate 1 g p.o..  He was admitted for further management."     Palliative medicine consulted for goals of care discussion, continuity of care, and advance care planning; for details of visit, see advance care planning section of plan.       Hospital Course:  No notes on file      Past Medical History:   Diagnosis Date    Acute hypoxic respiratory failure 09/23/2024    Oxygen therapy 24/7 now, continue 2 L oxygen therapy.      BPH (benign prostatic hyperplasia) 02/28/2024    CAD (coronary artery disease)     " "Sees [facility], h/o stent    Chronic HFrEF (heart failure with reduced ejection fraction) 05/01/2024    Diabetes mellitus     Hypertension     Kidney stone     Stroke     age 62    Type 2 diabetes mellitus without complication, without long-term current use of insulin 10/08/2021     Past Surgical History:   Procedure Laterality Date    24 HOUR IMPEDANCE PH MONITORING OF ESOPHAGUS IN PATIENT NOT TAKING ACID REDUCING MEDICATIONS N/A 11/13/2024    Procedure: IMPEDANCE PH STUDY, ESOPHAGEAL, 24 HOUR, IN PATIENT NOT TAKING ACID REDUCING MEDICATION;  Surgeon: Stephany Mendoza MD;  Location: [facility] (4TH FLR);  Service: Endoscopy;  Laterality: N/A;  referral dr miguel/ off ppi / prep inst sent to pt via mail  11/6- precall attempted no answer. Unable to [facility]-  11/8 - pt not called, per chart review patient currently inpatient at [place] [id number]    CARDIAC CATHETERIZATION      with stent    COLONOSCOPY N/A 03/27/2024    Procedure: COLONOSCOPY;  Surgeon: Ariela Castro MD;  Location: [facility];  Service: Endoscopy;  Laterality: N/A;    ESOPHAGEAL MANOMETRY WITH MEASUREMENT OF IMPEDANCE N/A 11/13/2024    Procedure: MANOMETRY, ESOPHAGUS, WITH IMPEDANCE MEASUREMENT;  Surgeon: Stephany Mendoza MD;  Location: [facility] (4TH FLR);  Service: Endoscopy;  Laterality: N/A;    ESOPHAGOGASTRODUODENOSCOPY N/A 03/27/2024    Procedure: EGD (ESOPHAGOGASTRODUODENOSCOPY);  Surgeon: Ariela Castro MD;  Location: [facility] ENDO;  Service: Endoscopy;  Laterality: N/A;    LEFT HEART CATHETERIZATION Left 9/10/2024    Procedure: Left heart cath;  Surgeon: Jemal Drummond MD;  Location: [facility] CATH LAB;  Service: Cardiology;  Laterality: Left;    SHOULDER SURGERY Right        Review of patient's allergies indicates:   Allergen Reactions    Dapagliflozin      Other reaction(s): Other (See Comments)    Pcn [penicillins]     Linagliptin Other (See Comments)     "it knocked me down", "it almost killed me"    Lisinopril Other (See Comments)    "  cough    Pantoprazole Hives       Medications:  Continuous Infusions:  Scheduled Meds:   amLODIPine  10 mg Oral Daily    arformoteroL  15 mcg Nebulization BID    aspirin  81 mg Oral Daily    budesonide  1 mg Nebulization BID    cefTRIAXone (Rocephin) IV (PEDS and ADULTS)  2 g Intravenous Q24H    enoxparin  40 mg Subcutaneous Daily    furosemide (LASIX) injection  40 mg Intravenous Daily    latanoprost  1 drop Both Eyes QHS    metoprolol succinate  50 mg Oral Daily    mycophenolate  1,000 mg Oral BID    nintedanib  150 mg Oral BID    oxybutynin  5 mg Oral Daily    pantoprazole  40 mg Oral Daily    sacubitriL-valsartan  1 tablet Oral BID    sucralfate  1 g Oral QID (AC & HS)     PRN Meds:  Current Facility-Administered Medications:     acetaminophen, 650 mg, Oral, Q8H PRN    acetaminophen, 650 mg, Oral, Q4H PRN    aluminum-magnesium hydroxide-simethicone, 30 mL, Oral, QID PRN    bisacodyL, 10 mg, Rectal, Daily PRN    dextromethorphan-guaiFENesin  mg/5 ml, 10 mL, Oral, Q4H PRN    famotidine, 20 mg, Oral, BID PRN    glucagon (human recombinant), 1 mg, Intramuscular, PRN    glucose, 16 g, Oral, PRN    glucose, 24 g, Oral, PRN    insulin aspart U-100, 0-5 Units, Subcutaneous, QID (AC + HS) PRN    magnesium oxide, 800 mg, Oral, PRN    magnesium oxide, 800 mg, Oral, PRN    melatonin, 6 mg, Oral, Nightly PRN    naloxone, 0.02 mg, Intravenous, PRN    ondansetron, 4 mg, Intravenous, Q8H PRN    potassium bicarbonate, 35 mEq, Oral, PRN    potassium bicarbonate, 50 mEq, Oral, PRN    potassium bicarbonate, 60 mEq, Oral, PRN    potassium, sodium phosphates, 2 packet, Oral, PRN    potassium, sodium phosphates, 2 packet, Oral, PRN    potassium, sodium phosphates, 2 packet, Oral, PRN    prochlorperazine, 5 mg, Intravenous, Q6H PRN    senna-docusate, 1 tablet, Oral, Daily PRN    simethicone, 1 tablet, Oral, QID PRN    sodium chloride 0.9%, 10 mL, Intravenous, Q12H PRN    Family History       Problem Relation (Age of Onset)     COPD Brother    Cancer Mother    Colon cancer Sister    Heart disease Sister          Tobacco Use    Smoking status: Never     Passive exposure: Never    Smokeless tobacco: Never   Substance and Sexual Activity    Alcohol use: No    Drug use: Never    Sexual activity: Not Currently       Review of Systems   Constitutional:  Positive for activity change, appetite change (more related to SOB with eating and BOSTON with preparing meals) and fatigue.   Respiratory:  Positive for cough and shortness of breath (BOSTON with minimal exertion).    Cardiovascular:  Negative for leg swelling (often an issue but not currently).   Neurological:  Positive for weakness.     Objective:     Vital Signs (Most Recent):  Temp: 97.8 °F (36.6 °C) (04/04/25 1226)  Pulse: 92 (04/04/25 1400)  Resp: 16 (04/04/25 1115)  BP: 125/80 (04/04/25 1400)  SpO2: 97 % (04/04/25 1400) Vital Signs (24h Range):  Temp:  [97.6 °F (36.4 °C)-97.8 °F (36.6 °C)] 97.8 °F (36.6 °C)  Pulse:  [] 92  Resp:  [16-42] 16  SpO2:  [94 %-100 %] 97 %  BP: (103-165)/() 125/80     Weight: 58.1 kg (128 lb)  Body mass index is 19.46 kg/m².       Physical Exam  Vitals and nursing note reviewed.   Constitutional:       Appearance: He is ill-appearing.   HENT:      Head: Normocephalic and atraumatic.   Pulmonary:      Effort: No respiratory distress.      Comments: Tachypnea and dyspnea that increases with prolonged talking, dyspnea resolved with pausing   Musculoskeletal:      Right lower leg: No edema.      Left lower leg: No edema.   Neurological:      Comments: Oriented to self, family, location, and relatively to time and situation; struggling with details and some memory of events in the past 24/hrs, but can recall basics of events             Advance Care Planning   Advance Directives:   Living Will: No    LaPOST: No    Do Not Resuscitate Status: No    Medical Power of : No        Oral Declaration: Yes   Witnesses:  Dr. Ayanna OLIVAREZ   Agent's Name:  Emmanuel  Juanita White's Contact Number:  [phone]    Decision Making:  Patient answered questions  Goals of Care: What is most important right now is to focus on spending time at home, avoiding the hospital, remaining as independent as possible, symptom/pain control, quality of life, even if it means sacrificing a little time. Accordingly, we have decided that the best plan to meet the patient's goals includes continuing with treatment.         Significant Labs: All pertinent labs within the past 24 hours have been reviewed.  CBC:   Recent Labs   Lab 04/04/25  0452   WBC 8.13   HGB 13.0*   HCT 39.7*   MCV 84        BMP:  Recent Labs   Lab 04/04/25 0452   *   K 4.5      CO2 23   BUN 21   CREATININE 0.7   CALCIUM 7.9*   MG 1.6     LFT:  Lab Results   Component Value Date    AST 15 04/04/2025    ALKPHOS 50 04/04/2025    BILITOT 0.6 04/04/2025     Albumin:   Albumin   Date Value Ref Range Status   04/04/2025 2.9 (L) 3.5 - 5.2 g/dL Final   03/17/2025 2.9 (L) 3.5 - 5.2 g/dL Final     Protein:   Total Protein   Date Value Ref Range Status   03/17/2025 6.7 6.0 - 8.4 g/dL Final     Lactic acid:   Lab Results   Component Value Date    LACTATE 1.8 04/03/2025    LACTATE 2.5 (H) 04/03/2025       Significant Imaging: I have reviewed all pertinent imaging results/findings within the past 24 hours.    Total visit time: 90 minutes    70 min visit time including: face to face time in discussion of symptom assessment, and exploring options and burdens of offered treatments.  This also includes non-face to face time preparing to see the patient including chart review, obtaining and/or reviewing separately obtained history, documenting clinical information in the electronic or other health record, independently interpreting results and communicating results to the patient/family/caregiver, family discussions by phone if not able to be present, coordination of care with other specialists, and discharge planning.      20 min ACP time spent: goals of care, advanced care planning, emotional support, formulating and communicating prognosis, exploring burden/ benefit of various approaches of treatment.     Janis Glaser NP  Palliative Medicine  [facility]      Janis Glaser NP  Palliative Medicine  [facility] [place] - Emergency Dept

## 2025-04-04 NOTE — PLAN OF CARE
Case Management Assessment     PCP: Wilfredo De Souza  Pharmacy: [facility]    Patient Arrived From: home   Existing Help at Home: none    Barriers to Discharge: none    Discharge Plan:    A. Home health   B. home    SW completed initial assessment and discussed discharge planning with patient at his bedside. Patient lives alone, but his son is his support system. Patient stated that one of his friends will provide transportation for him to get home when discharge from the hospital.     04/04/25 1343   Discharge Assessment   Assessment Type Discharge Planning Assessment   Confirmed/corrected address, phone number and insurance Yes   Confirmed Demographics Correct on Facesheet   Source of Information patient   Communicated DERIC with patient/caregiver Date not available/Unable to determine   Reason For Admission IPF (Idiopathic pulmonary fibrosis)   People in Home alone   Do you expect to return to your current living situation? Yes   Do you have help at home or someone to help you manage your care at home? No   Prior to hospitilization cognitive status: Alert/Oriented   Current cognitive status: Alert/Oriented   Walking or Climbing Stairs Difficulty no   Dressing/Bathing Difficulty no   Equipment Currently Used at Home oxygen   Readmission within 30 days? Yes   Do you currently have service(s) that help you manage your care at home? No   Do you take prescription medications? Yes   Do you have prescription coverage? Yes   Coverage [employer]   Do you have any problems affording any of your prescribed medications? No   Is the patient taking medications as prescribed? yes   Who is going to help you get home at discharge? JuanitaJordankleber Serraon (Son)  [phone] (Mobile)   How do you get to doctors appointments? family or friend will provide   Are you on dialysis? No   Do you take coumadin? No   Discharge Plan A Home Health   Discharge Plan B Home   DME Needed Upon Discharge  none   Discharge Plan discussed  with: Patient   Transition of Care Barriers None

## 2025-04-04 NOTE — ASSESSMENT & PLAN NOTE
4/4/2025 - Consult   - consult received; interval chart reviewed in detail  - pt known to myself and palliative medicine team from previous admission earlier this week   - met with patient at bedside; introduction to palliative medicine team and role in current care and admission   - pt lives independently at home; he is not  and his 3 sons live out of state; his independence is very important to him and continuing to maintain independence was a priority in discussions this and previous consults   - given nature of pt's condition and lack of respiratory reserve, shared concern for pt continuing to drive with pt and son  - [facility]medical [facility] and GOC/ACP discussion   - pt confirms that his son, June Grant Jr, is a physician and his preferred MPOA; in previous admission pt identified his brother as best contact/preferred MPOA but pt sites that he understands his son should be included in what is going on with him and would be best to assist  - reviewed code status; sited pt's previous code status discussion last admission where he expressed understanding that intubation/ventilator would not be beneficial for him, but we planned to discuss cardiac resuscitation further; Ayanna Toney provided detailed explanation of pt's condition and reasons why DNR would be very appropriate for pt   - pt agrees that he feels DNR would likely be most inline with his overall wishes he is just very fearful of dying and wants to live as long as possible; pt to think about it more and Dr. Urena plans to follow up 4/5 on this; shared with Dr. Urena and Dr. Hunt that including pt's son in this formal decision is advised as pt did have some AMS on admission, which is improving)   - reviewed recommendation of hospice and philosophy of care of hospice; reviewed potential services and plan of care for pt based on his specific needs   - Dr. Urena in agreement with recommendation for hospice as option for  optimal symptom management and safe plan for pt living independently, and encouraged consideration to pt (later discussed with Dr. Hunt, who is also in agreement with this plan)   - pt agrees that hospice does sound like a good option for him and is agreeable to me discussing this with his son   - following visit called pt's son Emmanuel Grant Jr; provided medical update for this and prior admissions; son aware of overall clinical status and physical limitations but confirms that pt has not been truthful with severity, symptom burden, or frequency of hospitalizations; shares that they have encouraged pt to allow for his relocation to [place] with his sons, but pt has not been agreeable  - discussed MDT recommendation for DNR and option for home hospice as a means of providing safe and appropriate level care for pt while respecting his wishes to be at home; son interested in this plan; ensured that MDT would cont to provide updates and include him on decisions  - recommended to HM and CM that a hospice information visit is coordinated when pt's mental status is baseline and when son can join by phone  - emotional support provided to pt and son during interactions   - Allowed time for questions/concerns; all addressed; expressed availability of myself/palliative team for additional questions/concerns   - updated MDT

## 2025-04-04 NOTE — SUBJECTIVE & OBJECTIVE
Past Medical History:   Diagnosis Date    Acute hypoxic respiratory failure 09/23/2024    Oxygen therapy 24/7 now, continue 2 L oxygen therapy.      BPH (benign prostatic hyperplasia) 02/28/2024    CAD (coronary artery disease)     Sees [facility], h/o stent    Chronic HFrEF (heart failure with reduced ejection fraction) 05/01/2024    Diabetes mellitus     Hypertension     Kidney stone     Stroke     age 62    Type 2 diabetes mellitus without complication, without long-term current use of insulin 10/08/2021       Past Surgical History:   Procedure Laterality Date    24 HOUR IMPEDANCE PH MONITORING OF ESOPHAGUS IN PATIENT NOT TAKING ACID REDUCING MEDICATIONS N/A 11/13/2024    Procedure: IMPEDANCE PH STUDY, ESOPHAGEAL, 24 HOUR, IN PATIENT NOT TAKING ACID REDUCING MEDICATION;  Surgeon: Stephany Mendoza MD;  Location: [facility] (4TH FLR);  Service: Endoscopy;  Laterality: N/A;  referral dr miguel/ off ppi / prep inst sent to pt via mail  11/6- precall attempted no answer. Unable to [facility]-  11/8 - pt not called, per chart review patient currently inpatient at [place] [address]    CARDIAC CATHETERIZATION      with stent    COLONOSCOPY N/A 03/27/2024    Procedure: COLONOSCOPY;  Surgeon: Ariela Castro MD;  Location: [facility];  Service: Endoscopy;  Laterality: N/A;    ESOPHAGEAL MANOMETRY WITH MEASUREMENT OF IMPEDANCE N/A 11/13/2024    Procedure: MANOMETRY, ESOPHAGUS, WITH IMPEDANCE MEASUREMENT;  Surgeon: Stephany Mendoza MD;  Location: [facility] (4TH FLR);  Service: Endoscopy;  Laterality: N/A;    ESOPHAGOGASTRODUODENOSCOPY N/A 03/27/2024    Procedure: EGD (ESOPHAGOGASTRODUODENOSCOPY);  Surgeon: Ariela Castro MD;  Location: [facility] ENDO;  Service: Endoscopy;  Laterality: N/A;    LEFT HEART CATHETERIZATION Left 9/10/2024    Procedure: Left heart cath;  Surgeon: Jemal Drummond MD;  Location: [facility] CATH LAB;  Service: Cardiology;  Laterality: Left;    SHOULDER SURGERY Right        Review of patient's allergies  "indicates:   Allergen Reactions    Dapagliflozin      Other reaction(s): Other (See Comments)    Pcn [penicillins]     Linagliptin Other (See Comments)     "it knocked me down", "it almost killed me"    Lisinopril Other (See Comments)     cough    Pantoprazole Hives       Family History       Problem Relation (Age of Onset)    COPD Brother    Cancer Mother    Colon cancer Sister    Heart disease Sister          Tobacco Use    Smoking status: Never     Passive exposure: Never    Smokeless tobacco: Never   Substance and Sexual Activity    Alcohol use: No    Drug use: Never    Sexual activity: Not Currently         Review of Systems   Constitutional:  Negative for activity change, appetite change, fatigue and fever.   HENT:  Negative for congestion, dental problem, facial swelling, mouth sores and trouble swallowing.    Eyes:  Negative for pain, discharge and visual disturbance.   Respiratory:  Positive for cough and shortness of breath. Negative for apnea, choking and wheezing.    Cardiovascular:  Negative for chest pain, palpitations and leg swelling.   Gastrointestinal:  Negative for abdominal distention, abdominal pain, constipation and diarrhea.   Genitourinary:  Negative for difficulty urinating and dysuria.   Neurological:  Positive for light-headedness. Negative for dizziness, tremors and speech difficulty.   Psychiatric/Behavioral:  Negative for agitation, behavioral problems, self-injury and suicidal ideas.    All other systems reviewed and are negative.    Objective:     Vital Signs (Most Recent):  Temp: 97.6 °F (36.4 °C) (04/04/25 0545)  Pulse: 100 (04/04/25 0601)  Resp: 16 (04/04/25 0601)  BP: 103/69 (04/04/25 0601)  SpO2: 97 % (04/04/25 0601) Vital Signs (24h Range):  Temp:  [97.6 °F (36.4 °C)-98.7 °F (37.1 °C)] 97.6 °F (36.4 °C)  Pulse:  [100-149] 100  Resp:  [16-42] 16  SpO2:  [94 %-100 %] 97 %  BP: (103-165)/() 103/69     Weight: 58.3 kg (128 lb 9.5 oz)  Body mass index is 19.55 " kg/m².      Intake/Output Summary (Last 24 hours) at 4/4/2025 0653  Last data filed at 4/3/2025 1610  Gross per 24 hour   Intake 500 ml   Output --   Net 500 ml        Physical Exam  Vitals and nursing note reviewed.   Constitutional:       General: He is not in acute distress.     Appearance: He is not ill-appearing.   HENT:      Mouth/Throat:      Mouth: Mucous membranes are moist.   Cardiovascular:      Rate and Rhythm: Tachycardia present.   Pulmonary:      Effort: Pulmonary effort is normal.      Breath sounds: No rhonchi.      Comments: Coarse breath sound bilaterally  Abdominal:      General: Abdomen is flat.   Skin:     General: Skin is warm.   Neurological:      Mental Status: He is alert.      Comments: Alert and interactive but confused at time.            Vents:       Lines/Drains/Airways       Peripheral Intravenous Line  Duration                  Peripheral IV - Single Lumen 04/03/25 1910 20 G Right Antecubital <1 day                    Significant Labs:    CBC/Anemia Profile:  Recent Labs   Lab 04/03/25  1457 04/04/25  0452   WBC 15.29* 8.13   HGB 16.1 13.0*   HCT 47.9 39.7*    212   MCV 84 84   RDW 15.0* 14.9*        Chemistries:  Recent Labs   Lab 04/03/25  1457 04/04/25  0452    135*   K 5.0 4.5    103   CO2 18* 23   BUN 27* 21   CREATININE 1.0 0.7   CALCIUM 9.4 7.9*   ALBUMIN 3.9 2.9*   BILITOT 0.8 0.6   ALKPHOS 69 50   ALT 29 19   AST 33 15   GLUCOSE 110 205*   MG 1.8 1.6   PHOS 2.9 2.7     BNP  Recent Labs   Lab 04/03/25  1457   *     Echo 3/17/25    Left Ventricle: The left ventricle is normal in size. There is mildly reduced systolic function with a visually estimated ejection fraction of 40 - 45%.    Right Ventricle: The right ventricle is normal in size. Systolic function is normal.    Aortic Valve: There is mild aortic valve sclerosis.    Mitral Valve: There is moderate mitral annular calcification.    Aorta: Aortic root is mildly dilated measuring 3.73 cm.     "Pulmonary Artery: The estimated pulmonary artery systolic pressure is 30 mmHg.    IVC/SVC: Normal venous pressure at 3 mmHg.    ABGs:   Recent Labs   Lab 04/03/25 2047   PH 7.376   PCO2 43.9   HCO3 25.7   POCSATURATED 61   BE 0     Cardiac Markers: No results for input(s): "CKMB", "TROPONINT", "MYOGLOBIN" in the last 48 hours.  Lactic Acid:   Recent Labs   Lab 04/03/25  1457 04/03/25  1647 04/03/25  1941   LACTATE 3.6* 2.5* 1.8     Respiratory Culture: No results for input(s): "GSRESP", "RESPIRATORYC" in the last 48 hours.    Echo 4/4/25    Left Ventricle: The left ventricle is normal in size. Normal wall thickness. Mild global hypokinesis present. There is mildly reduced systolic function with a visually estimated ejection fraction of 45 - 50%.    Right Ventricle: The right ventricle is normal in size. Systolic function is normal.    Aorta: Aortic root is mildly dilated measuring 3.82 cm.    Similar findings noted on prior report dated 3/17/25.  Significant Imaging:   I have reviewed all pertinent imaging results/findings within the past 24 hours.  CT: I have reviewed all pertinent results/findings within the past 24 hours and my personal findings are:  4/3/25 no overt ggo.  +patchy septal reticulation and honeycombing predominantly at bases.  This is similar to findings on 3/30/25 CT but has worsen when compared to CTA 7/7/23        "

## 2025-04-04 NOTE — ASSESSMENT & PLAN NOTE
Continue CellCept, Ofev  At baseline O2, less likely ILD exacerbation   Continue prednisone taper  Consult pulmonary   Consult palliative care

## 2025-04-04 NOTE — CONSULTS
[facility] - Emergency Dept  Pulmonology  Consult Note    Patient Name: Emmanuel Grant  MRN: [id number]  Admission Date: 4/3/2025  Hospital Length of Stay: 1 days  Code Status: Full Code  Attending Physician: Blanquita Hunt MD  Primary Care Provider: Wilfredo De Souza MD   Principal Problem: IPF (idiopathic pulmonary fibrosis)    Inpatient consult to Pulmonology  Consult performed by: Pino Urena MD  Consult ordered by: Noe Rey MD        Subjective:     HPI:  Patient is 74 y.o. male  has a past medical history of Acute hypoxic respiratory failure (09/23/2024), BPH (benign prostatic hyperplasia) (02/28/2024), CAD (coronary artery disease), Chronic HFrEF (heart failure with reduced ejection fraction) (05/01/2024), Diabetes mellitus, Hypertension, Kidney stone, Stroke, and Type 2 diabetes mellitus without complication, without long-term current use of insulin (10/08/2021). presented to [facility] on 4/3/25 with worsening sob and tachycardia.  Patient presented to endocrinology clinic for routine follow-up, and was noted to be dyspneic and confused. He was referred to the ED for further evaluation.     In the ED, the patient was tachycardic (120s-140s), tachypneic (20s-40s), was placed on 3 L O2. Labs were remarkable for leukocytosis (15.2), elevated D-dimer (1.55), elevated BNP (115), elevated lactic acid (3.6 > 1.8), elevated procalcitonin (0.33), low TSH (0.143). UDS was positive for opioids. UA was unremarkable. Chest x-ray showed chronic interstitial appearance, with no acute process. CT abdomen and pelvis showed mild wall thickening/increased mucosal enhancement involving the gastric antral and pyloric regions (possible acute gastritis). Patient was given 500 mL of NS, vancomycin, cefepime, Benadryl 25 mg IV, Ativan 1 mg IV, morphine 4 mg IV, Zofran 4 mg IV, Carafate 1 g p.o. CTA without central pe and chronic uip pattern.  Patient was restarted back on cellcept and low dose prednisone.   Pulmonary  was consulted for further inputs.    During my initial evaluation, patient is alert and interactive on nasal canula.  Vss.      Patient is followed by Dr. Schmitt and Palma as an outpatient for IPF.  He was seen by Dr. Mix at [facility].  Patient was started on OFEV along with cellcept along with weaning regimen of prednisone.      Additional Pulmonary History:  Childhood Illnesses:  none  Occupational:   works in air conditioning in installation and repair  Environmental:   no pets, no seasonal allergies, no carpet in his home and no concern for mold or allergy exposures there  Tobacco/Smoking:   never smoker      Past Medical History:   Diagnosis Date    Acute hypoxic respiratory failure 09/23/2024    Oxygen therapy 24/7 now, continue 2 L oxygen therapy.      BPH (benign prostatic hyperplasia) 02/28/2024    CAD (coronary artery disease)     Sees [facility], h/o stent    Chronic HFrEF (heart failure with reduced ejection fraction) 05/01/2024    Diabetes mellitus     Hypertension     Kidney stone     Stroke     age 62    Type 2 diabetes mellitus without complication, without long-term current use of insulin 10/08/2021       Past Surgical History:   Procedure Laterality Date    24 HOUR IMPEDANCE PH MONITORING OF ESOPHAGUS IN PATIENT NOT TAKING ACID REDUCING MEDICATIONS N/A 11/13/2024    Procedure: IMPEDANCE PH STUDY, ESOPHAGEAL, 24 HOUR, IN PATIENT NOT TAKING ACID REDUCING MEDICATION;  Surgeon: Stephany Mendoza MD;  Location: [facility] ([address] [place] [address]);  Service: Endoscopy;  Laterality: N/A;  referral dr miguel/ off ppi / prep inst sent to pt via mail  11/6- precall attempted no answer. Unable to [facility]-  11/8 - pt not called, per chart review patient currently inpatient at [place] [address]    CARDIAC CATHETERIZATION      with stent    COLONOSCOPY N/A 03/27/2024    Procedure: COLONOSCOPY;  Surgeon: Ariela Castro MD;  Location: [facility];  Service: Endoscopy;  Laterality: N/A;    ESOPHAGEAL MANOMETRY WITH MEASUREMENT OF  "IMPEDANCE N/A 11/13/2024    Procedure: MANOMETRY, ESOPHAGUS, WITH IMPEDANCE MEASUREMENT;  Surgeon: Stephany Mendoza MD;  [facility]  Service: Endoscopy;  Laterality: N/A;    ESOPHAGOGASTRODUODENOSCOPY N/A 03/27/2024    Procedure: EGD (ESOPHAGOGASTRODUODENOSCOPY);  Surgeon: Ariela Castro MD;  Location: [facility] ENDO;  Service: Endoscopy;  Laterality: N/A;    LEFT HEART CATHETERIZATION Left 9/10/2024    Procedure: Left heart cath;  Surgeon: Jemal Drummond MD;  Location: [facility] CATH LAB;  Service: Cardiology;  Laterality: Left;    SHOULDER SURGERY Right        Review of patient's allergies indicates:   Allergen Reactions    Dapagliflozin      Other reaction(s): Other (See Comments)    Pcn [penicillins]     Linagliptin Other (See Comments)     "it knocked me down", "it almost killed me"    Lisinopril Other (See Comments)     cough    Pantoprazole Hives       Family History       Problem Relation (Age of Onset)    COPD Brother    Cancer Mother    Colon cancer Sister    Heart disease Sister          Tobacco Use    Smoking status: Never     Passive exposure: Never    Smokeless tobacco: Never   Substance and Sexual Activity    Alcohol use: No    Drug use: Never    Sexual activity: Not Currently         Review of Systems   Constitutional:  Negative for activity change, appetite change, fatigue and fever.   HENT:  Negative for congestion, dental problem, facial swelling, mouth sores and trouble swallowing.    Eyes:  Negative for pain, discharge and visual disturbance.   Respiratory:  Positive for cough and shortness of breath. Negative for apnea, choking and wheezing.    Cardiovascular:  Negative for chest pain, palpitations and leg swelling.   Gastrointestinal:  Negative for abdominal distention, abdominal pain, constipation and diarrhea.   Genitourinary:  Negative for difficulty urinating and dysuria.   Neurological:  Positive for light-headedness. Negative for dizziness, tremors and speech " difficulty.   Psychiatric/Behavioral:  Negative for agitation, behavioral problems, self-injury and suicidal ideas.    All other systems reviewed and are negative.    Objective:     Vital Signs (Most Recent):  Temp: 97.6 °F (36.4 °C) (04/04/25 0545)  Pulse: 100 (04/04/25 0601)  Resp: 16 (04/04/25 0601)  BP: 103/69 (04/04/25 0601)  SpO2: 97 % (04/04/25 0601) Vital Signs (24h Range):  Temp:  [97.6 °F (36.4 °C)-98.7 °F (37.1 °C)] 97.6 °F (36.4 °C)  Pulse:  [100-149] 100  Resp:  [16-42] 16  SpO2:  [94 %-100 %] 97 %  BP: (103-165)/() 103/69     Weight: 58.3 kg (128 lb 9.5 oz)  Body mass index is 19.55 kg/m².      Intake/Output Summary (Last 24 hours) at 4/4/2025 0653  Last data filed at 4/3/2025 1610  Gross per 24 hour   Intake 500 ml   Output --   Net 500 ml        Physical Exam  Vitals and nursing note reviewed.   Constitutional:       General: He is not in acute distress.     Appearance: He is not ill-appearing.   HENT:      Mouth/Throat:      Mouth: Mucous membranes are moist.   Cardiovascular:      Rate and Rhythm: Tachycardia present.   Pulmonary:      Effort: Pulmonary effort is normal.      Breath sounds: No rhonchi.      Comments: Coarse breath sound bilaterally  Abdominal:      General: Abdomen is flat.   Skin:     General: Skin is warm.   Neurological:      Mental Status: He is alert.      Comments: Alert and interactive but confused at time.            Vents:       Lines/Drains/Airways       Peripheral Intravenous Line  Duration                  Peripheral IV - Single Lumen 04/03/25 1910 20 G Right Antecubital <1 day                    Significant Labs:    CBC/Anemia Profile:  Recent Labs   Lab 04/03/25  1457 04/04/25  0452   WBC 15.29* 8.13   HGB 16.1 13.0*   HCT 47.9 39.7*    212   MCV 84 84   RDW 15.0* 14.9*        Chemistries:  Recent Labs   Lab 04/03/25  1457 04/04/25  0452    135*   K 5.0 4.5    103   CO2 18* 23   BUN 27* 21   CREATININE 1.0 0.7   CALCIUM 9.4 7.9*   ALBUMIN 3.9  "2.9*   BILITOT 0.8 0.6   ALKPHOS 69 50   ALT 29 19   AST 33 15   GLUCOSE 110 205*   MG 1.8 1.6   PHOS 2.9 2.7     BNP  Recent Labs   Lab 04/03/25  1457   *     Echo 3/17/25    Left Ventricle: The left ventricle is normal in size. There is mildly reduced systolic function with a visually estimated ejection fraction of 40 - 45%.    Right Ventricle: The right ventricle is normal in size. Systolic function is normal.    Aortic Valve: There is mild aortic valve sclerosis.    Mitral Valve: There is moderate mitral annular calcification.    Aorta: Aortic root is mildly dilated measuring 3.73 cm.    Pulmonary Artery: The estimated pulmonary artery systolic pressure is 30 mmHg.    IVC/SVC: Normal venous pressure at 3 mmHg.    ABGs:   Recent Labs   Lab 04/03/25 2047   PH 7.376   PCO2 43.9   HCO3 25.7   POCSATURATED 61   BE 0     Cardiac Markers: No results for input(s): "CKMB", "TROPONINT", "MYOGLOBIN" in the last 48 hours.  Lactic Acid:   Recent Labs   Lab 04/03/25  1457 04/03/25  1647 04/03/25  1941   LACTATE 3.6* 2.5* 1.8     Respiratory Culture: No results for input(s): "GSRESP", "RESPIRATORYC" in the last 48 hours.    Echo 4/4/25    Left Ventricle: The left ventricle is normal in size. Normal wall thickness. Mild global hypokinesis present. There is mildly reduced systolic function with a visually estimated ejection fraction of 45 - 50%.    Right Ventricle: The right ventricle is normal in size. Systolic function is normal.    Aorta: Aortic root is mildly dilated measuring 3.82 cm.    Similar findings noted on prior report dated 3/17/25.  Significant Imaging:   I have reviewed all pertinent imaging results/findings within the past 24 hours.  CT: I have reviewed all pertinent results/findings within the past 24 hours and my personal findings are:  4/3/25 no overt ggo.  +patchy septal reticulation and honeycombing predominantly at bases.  This is similar to findings on 3/30/25 CT but has worsen when compared to CTA " 7/7/23          ABG  Recent Labs   Lab 04/03/25 2047   PH 7.376   PO2 33*   PCO2 43.9   HCO3 25.7   BE 0     Assessment/Plan:     Pulmonary  * IPF (idiopathic pulmonary fibrosis)  Ct with basilar honeycombing and lack of ggo c/w uip.  Given patient's age, findings c/w ipf  Already on ofev and also with cellcept.  Seen by Dr. Schmitt as an outpatient.  Unable to perform pft   Result of CT d/w patient.  He is aware of progression AEB more prominent honeycombing on 4/3/25 CT as compared to CT in 2023  Will start steroid given some subjective improvement in wob with steroid.    Resume ofev at the time of discharge    Chronic hypoxemic respiratory failure  Patient with Hypoxic Respiratory failure which is Acute on chronic.  he is on home oxygen at 2 LPM. Supplemental oxygen was provided and noted-      .   Signs/symptoms of respiratory failure include- increased work of breathing and lethargy. Contributing diagnoses includes -  pulmonary fibrosis  Labs and images were reviewed. Patient Has recent ABG, which has been reviewed. Will treat underlying causes and adjust management of respiratory failure as follows-   Steroid + diuresis    Cardiac/Vascular  Chronic systolic congestive heart failure  Patient has Systolic (HFrEF) heart failure that is Chronic. On presentation their CHF was decompensated. Evidence of decompensated CHF on presentation includes: shortness of breath. Most recent BNP and echo results are listed below.  Recent Labs     04/03/25  1457   *     Latest ECHO  Results for orders placed during the hospital encounter of 04/03/25    Echo    Interpretation Summary    Left Ventricle: The left ventricle is normal in size. Normal wall thickness. Mild global hypokinesis present. There is mildly reduced systolic function with a visually estimated ejection fraction of 45 - 50%.    Right Ventricle: The right ventricle is normal in size. Systolic function is normal.    Aorta: Aortic root is mildly dilated  measuring 3.82 cm.    Similar findings noted on prior report dated 3/17/25.    Current Heart Failure Medications  metoprolol succinate (TOPROL-XL) 24 hr tablet 50 mg, Daily, Oral  sacubitriL-valsartan 24-26 mg per tablet 1 tablet, 2 times daily, Oral  furosemide injection 40 mg, Daily, Intravenous    Plan  - Monitor strict I&Os and daily weights.    - Place on telemetry  - Low sodium diet  - will increase lasix to 40 mg daily    Palliative Care  Advance care planning  Given patient progressing dyspnea and fibrosis on CT, During this visit, I engaged the patient in the advance care planning process along with ANABELA Glaser.  The patient and I reviewed the role for advance directives and their purpose in directing future healthcare if the patient's unable to speak for him/herself.  At this point in time, the patient borderline decision-making capacity.  We discussed different extreme health states that patient could experience, and reviewed what kind of medical care patient would want in those situations.  The patient communicated that if he was comatose and had little chance of a meaningful recovery, he would is not sure if he would want machines/life-sustaining treatments used.  In addition to the above preference, patient  HAS NOT completed a living will to reflect these preferences.  The patient HAS designated his son, Nikos Grant, as healthcare power of  to make decisions on her behalf.  In the case of cardiopulmonary arrest, patient is not sure if would want CPR, intubation or cardioversion.  At time, patient seems overwhelm and confused.  Will continue with further conversation in the company of family.      16 minutes spent discussing GOC.            Thank you for your consult. I will follow-up with patient. Please contact us if you have any additional questions.     Pino Urena MD  Pulmonology  [facility] - Emergency Dept

## 2025-04-04 NOTE — HOSPITAL COURSE
Patient is 74 y.o. male has a past medical history of Acute hypoxic respiratory failure (09/23/2024), BPH (benign prostatic hyperplasia) (02/28/2024), CAD (coronary artery disease), Chronic HFrEF (heart failure with reduced ejection fraction) (05/01/2024), Diabetes mellitus, Hypertension, Kidney stone, Stroke, and Type 2 diabetes mellitus without complication, without long-term current use of insulin (10/08/2021). Presented to [facility] on 4/3/25 with worsening sob and tachycardia and was admitted for acute exacerbation of interstitial pulmonary fibrosis.  Supplemental oxygen requirement improving with steroids. Steroid stopped 4/3. RIP negative. Repeat 2 D echo EF 45-50% (similar to previous EKG last month), indeterminate diastolic function. Repeat CTA chest no PE. Pulmonology team discussed worsening of his IPF. Palliative team discussed hospice with patient and son via phone and both agreed to hospice. Remained full code. Patient ambulating very frequently out of room as he does not like to say in room. VS stable and oxygenating well on 2 L NC 98-99%.

## 2025-04-04 NOTE — ED NOTES
After walking out of another Pt's room, I observed this Pt standing up on the side of the bed attempting to grab a urinal. The Pt then stumbled backwards landing on the floor and appeared to hit back of head on the wall. Assistance called to help Pt back into bed and MD and Charge RN made aware that the Pt is awake and a continuous risk to himself because of the fact that he refuses to listen to staff and won't utilize call light to ask for assistance. Dr. Rey at bedside to assess Pt. CT to be placed d/t potential head injury. Safety sitter now at bedside for remainder of Pt's stay, external urinary catheter applied, and side rails raised x2 w/ clear instructions given to Pt and sitter that he is not allowed to get up, Pt verbalized what is now expected of him to myself and Dr. Rey.

## 2025-04-04 NOTE — ASSESSMENT & PLAN NOTE
Ct with basilar honeycombing and lack of ggo c/w uip.  Given patient's age, findings c/w ipf  Already on ofev and also with cellcept.  Seen by Dr. Schmitt as an outpatient.  Unable to perform pft   Result of CT d/w patient.  He is aware of progression AEB more prominent honeycombing on 4/3/25 CT as compared to CT in 2023  Will start steroid given some subjective improvement in wob with steroid.    Resume ofev at the time of discharge

## 2025-04-04 NOTE — ASSESSMENT & PLAN NOTE
- chronic condition noted; was attempting OP follow up with endocrinologist when exacerbation of symptoms lead to her assisting with admission to ED (see endocrinology note 4/3)   - pt has responded well to steroid treatment in the past and would likely be beneficial in the future, even if agreeable to hospice; assistance of hospice would also help with need for adjustment in DM regimen if needed during periods of steroid use   - cont mgmt per HM

## 2025-04-04 NOTE — ASSESSMENT & PLAN NOTE
Patient with Hypoxic Respiratory failure which is Chronic.  he is on home oxygen at 3 LPM. Supplemental oxygen was provided and noted-      .   Signs/symptoms of respiratory failure include- increased work of breathing. Contributing diagnoses includes - ILD - possibly PE Labs and images were reviewed. Patient Has recent ABG, which has been reviewed. Will treat underlying causes and adjust management of respiratory failure as follows-     Solumedrol 125 mg IV x1. Continue steroid taper   Obtain CTA chest to rule out PE, given persistent tachycardia

## 2025-04-04 NOTE — ASSESSMENT & PLAN NOTE
Patient's blood pressure range in the last 24 hours was: BP  Min: 118/83  Max: 165/102.The patient's inpatient anti-hypertensive regimen is listed below:  Current Antihypertensives  amLODIPine tablet 10 mg, Daily, Oral  furosemide tablet 20 mg, Daily, Oral  metoprolol succinate (TOPROL-XL) 24 hr tablet 50 mg, Daily, Oral    Plan  - BP is controlled, no changes needed to their regimen

## 2025-04-04 NOTE — PROGRESS NOTES
[facility] Medicine  Progress Note    Patient Name: Emmanuel Grant  MRN: [id number]  Patient Class: IP- Inpatient   Admission Date: 4/3/2025  Length of Stay: 1 days  Attending Physician: Blanquita Hunt MD  Primary Care Provider: Wilfredo De Souza MD        Subjective     Principal Problem:IPF (idiopathic pulmonary fibrosis)        HPI:  This is a 73-year-old male with a past medical history of ILD/pulmonary fibrosis (on 3L O2), CAD, HFrEF (EF: 40%), hypertension, type 2 diabetes, hyperlipidemia, CVA, BPH who presents with shortness of breath.     Patient presented to endocrinology clinic for routine follow-up, and was noted to be dyspneic.  He was referred to the ED for further evaluation. On my examination, patient was altered after receiving ativan, but reportedly he had worsening dyspnea, dry cough and abdominal pain. Of note, the patient had 3 hospitalizations over the last month: 1) 3/16-3/17, for ACS r/o, 2) 3/23-3/25 for acute on chronic hypoxia in the setting of ILD/HF, 3) 3/29-4/1 for ILD flare.     In the ED, the patient was tachycardic (120s-140s), tachypneic (20s-40s), was placed on 3 L O2.  Labs were remarkable for leukocytosis (15.2), elevated D-dimer (1.55), elevated BNP (115), elevated lactic acid (3.6  > 1.8), elevated procalcitonin (0.33), low TSH (0.143).  UDS was positive for opioids.  UA was unremarkable.  Chest x-ray showed chronic interstitial appearance, with no acute process.  CT abdomen and pelvis showed mild wall thickening/increased mucosal enhancement involving the gastric antral and pyloric regions (possible acute gastritis).  Patient was given 500 mL of NS, vancomycin, cefepime, Benadryl 25 mg IV, Ativan 1 mg IV, morphine 4 mg IV, Zofran 4 mg IV, Carafate 1 g p.o..  He was admitted for further management.    Overview/Hospital Course:  Patient is 74 y.o. male has a past medical history of Acute hypoxic respiratory failure (09/23/2024), BPH (benign prostatic  hyperplasia) (02/28/2024), CAD (coronary artery disease), Chronic HFrEF (heart failure with reduced ejection fraction) (05/01/2024), Diabetes mellitus, Hypertension, Kidney stone, Stroke, and Type 2 diabetes mellitus without complication, without long-term current use of insulin (10/08/2021). presented to [facility] on 4/3/25 with worsening sob and tachycardia and was admitted for acute exacerbation of interstitial pulmonary fibrosis.  Supplemental oxygen requirement improving with steroids.    Interval History:  No acute event overnight.  Supplemental oxygen requirement improving with steroids.  Noted radiographic worsening of chronic combined on CT compared to previous imaging.    Review of Systems   Constitutional:  Positive for fatigue. Negative for fever.   Respiratory:  Positive for cough, shortness of breath and wheezing.      Objective:     Vital Signs (Most Recent):  Temp: 97.3 °F (36.3 °C) (04/04/25 1614)  Pulse: 96 (04/04/25 1614)  Resp: 18 (04/04/25 1614)  BP: 126/86 (04/04/25 1614)  SpO2: 99 % (04/04/25 1614) Vital Signs (24h Range):  Temp:  [97.3 °F (36.3 °C)-97.8 °F (36.6 °C)] 97.3 °F (36.3 °C)  Pulse:  [] 96  Resp:  [16-42] 18  SpO2:  [94 %-100 %] 99 %  BP: (102-165)/() 126/86     Weight: 58.1 kg (128 lb)  Body mass index is 19.46 kg/m².  No intake or output data in the 24 hours ending 04/04/25 1705      Physical Exam  Constitutional:       General: He is in acute distress.      Appearance: He is ill-appearing. He is not toxic-appearing or diaphoretic.   Cardiovascular:      Rate and Rhythm: Tachycardia present. Rhythm irregular.      Pulses: Normal pulses.      Heart sounds: Normal heart sounds.   Pulmonary:      Effort: No respiratory distress.      Breath sounds: No stridor. Wheezing and rhonchi present.      Comments: Increased work of breathing with conversational dyspnea  Abdominal:      General: Abdomen is flat. There is no distension.      Palpations: Abdomen is soft. There  is mass.   Neurological:      General: No focal deficit present.      Mental Status: He is oriented to person, place, and time.               Significant Labs: CBC:   Recent Labs   Lab 04/03/25  1457 04/04/25  0452   WBC 15.29* 8.13   HGB 16.1 13.0*   HCT 47.9 39.7*    212     CMP:   Recent Labs   Lab 04/03/25  1457 04/04/25  0452    135*   K 5.0 4.5    103   CO2 18* 23   BUN 27* 21   CREATININE 1.0 0.7   CALCIUM 9.4 7.9*   ALBUMIN 3.9 2.9*   BILITOT 0.8 0.6   ALKPHOS 69 50   AST 33 15   ALT 29 19   ANIONGAP 15 9       Significant Imaging:   CT Head Without Contrast   Final Result      No acute intracranial abnormalities identified.         Electronically signed by: Brody Mejia MD   Date:    04/03/2025   Time:    23:42      CTA Chest Non-Coronary (PE Studies)   Final Result      1. No evidence of PE.   2. Chronic interstitial lung disease/pulmonary fibrosis.         Electronically signed by: Brody Mejia MD   Date:    04/03/2025   Time:    22:00      CT Abdomen Pelvis With IV Contrast NO Oral Contrast   Final Result      1. Mild wall thickening with relative increased mucosal enhancement involving the gastric antral and pyloric region which can be seen in the setting of acute gastritis.   2. No additional acute intra-abdominal abnormalities identified.   3. Chronic interstitial lung disease/pulmonary fibrosis.   4. Multiple additional findings as detailed above.         Electronically signed by: Brody Mejia MD   Date:    04/03/2025   Time:    17:42      X-Ray Chest AP Portable   Final Result      Chronic interstitial lung disease/pulmonary fibrosis.  No superimposed acute cardiopulmonary process identified.  No significant change.         Electronically signed by: Brody Mejia MD   Date:    04/03/2025   Time:    17:10              Assessment & Plan  IPF (idiopathic pulmonary fibrosis)  Hx of IPF referred from endocrinologist office  CT chest on presentation showed features consistent  with IPF  Continue CellCept  Continue prednisone taper  Continue empiric ceftriaxone given elevated procalcitonin  Consult pulmonary and palliative care;input appreciated  Restart home ofeiv upon discharge  Type 2 diabetes mellitus without complication, without long-term current use of insulin  Lab Results   Component Value Date    HGBA1C 7.8 (H) 01/02/2025     Glycemic protocol. LDSS    Coronary artery disease involving native coronary artery of native heart with angina pectoris  No acute issues.  Essential hypertension  Patient's blood pressure range in the last 24 hours was: BP  Min: 102/73  Max: 165/102.The patient's inpatient anti-hypertensive regimen is listed below:  Current Antihypertensives  amLODIPine tablet 10 mg, Daily, Oral  metoprolol succinate (TOPROL-XL) 24 hr tablet 50 mg, Daily, Oral  furosemide injection 40 mg, Daily, Intravenous    Plan  - BP is controlled, no changes needed to their regimen  H/O: CVA (cerebrovascular accident)  History noted.  Continue home medications.    Chronic hypoxemic respiratory failure  Patient with Hypoxic Respiratory failure which is Chronic.  he is on home oxygen at 3 LPM. Supplemental oxygen was provided and noted-      .   Signs/symptoms of respiratory failure include- increased work of breathing. Contributing diagnoses includes -  ILD - possibly PE  Labs and images were reviewed. Patient Has recent ABG, which has been reviewed. Will treat underlying causes and adjust management of respiratory failure as follows-     Solumedrol 125 mg IV x1. Continue steroid taper   Obtain CTA chest to rule out PE, given persistent tachycardia   Sinus tachycardia  Less likely sepsis.   CTA chest ordered  Echocardiogram ordered   Gastritis  Continue Protonix, Carafate    Chronic systolic congestive heart failure  Patient has Combined Systolic and Diastolic heart failure that is Chronic. Most recent BNP and echo results are listed below.  Recent Labs     04/03/25  1457   *      Latest ECHO  Results for orders placed during the hospital encounter of 04/03/25    Echo    Interpretation Summary    Left Ventricle: The left ventricle is normal in size. Normal wall thickness. Mild global hypokinesis present. There is mildly reduced systolic function with a visually estimated ejection fraction of 45 - 50%.    Right Ventricle: The right ventricle is normal in size. Systolic function is normal.    Aorta: Aortic root is mildly dilated measuring 3.82 cm.    Similar findings noted on prior report dated 3/17/25.    Current Heart Failure Medications  metoprolol succinate (TOPROL-XL) 24 hr tablet 50 mg, Daily, Oral  sacubitriL-valsartan 24-26 mg per tablet 1 tablet, 2 times daily, Oral  furosemide injection 40 mg, Daily, Intravenous    Plan  - Monitor strict I&Os and daily weights.    - Place on telemetry  - Low sodium diet  - Place on fluid restriction of 1.5 L.   - Cardiology has not been consulted  - The patient's volume status is at their baseline        Advance care planning      VTE Risk Mitigation (From admission, onward)           Ordered     enoxaparin injection 40 mg  Daily         04/03/25 2055     IP VTE HIGH RISK PATIENT  Once         04/03/25 2055     Place sequential compression device  Until discontinued         04/03/25 2055                    Discharge Planning   DERIC:      Code Status: Full Code   Medical Readiness for Discharge Date:   Discharge Plan A: Home Health                        Blanquita Hunt MD  Department of Hospital Medicine   [facility]

## 2025-04-04 NOTE — ASSESSMENT & PLAN NOTE
Hx of IPF referred from endocrinologist office  CT chest on presentation showed features consistent with IPF  Continue CellCept  Continue prednisone taper  Continue empiric ceftriaxone given elevated procalcitonin  Consult pulmonary and palliative care;input appreciated  Restart home ofeiv upon discharge

## 2025-04-04 NOTE — NURSING
Report received  Arrived and care assumed. Discussed plan of care and safety with patient . Reviewed call system. No acute distress noted [facility] [place]  Nurses Note -- 4 Eyes      4/4/2025       Skin assessed on: Admit      [x] No Pressure Injuries Present    [x]Prevention Measures Documented    [] Yes LDA  for Pressure Injury Previously documented     [] Yes New Pressure Injury Discovered   [] LDA for New Pressure Injury Added      Attending RN:  Ruthy Ohara, RN     Second Staff:  Marta Carlos

## 2025-04-04 NOTE — SUBJECTIVE & OBJECTIVE
Past Medical History:   Diagnosis Date    Acute hypoxic respiratory failure 09/23/2024    Oxygen therapy 24/7 now, continue 2 L oxygen therapy.      BPH (benign prostatic hyperplasia) 02/28/2024    CAD (coronary artery disease)     Sees [facility], h/o stent    Chronic HFrEF (heart failure with reduced ejection fraction) 05/01/2024    Diabetes mellitus     Hypertension     Kidney stone     Stroke     age 62    Type 2 diabetes mellitus without complication, without long-term current use of insulin 10/08/2021     Past Surgical History:   Procedure Laterality Date    24 HOUR IMPEDANCE PH MONITORING OF ESOPHAGUS IN PATIENT NOT TAKING ACID REDUCING MEDICATIONS N/A 11/13/2024    Procedure: IMPEDANCE PH STUDY, ESOPHAGEAL, 24 HOUR, IN PATIENT NOT TAKING ACID REDUCING MEDICATION;  Surgeon: Stephany Mendoza MD;  Location: [facility] (4TH FLR);  Service: Endoscopy;  Laterality: N/A;  referral dr miguel/ off ppi / prep inst sent to pt via mail  11/6- precall attempted no answer. Unable to [facility]-  11/8 - pt not called, per chart review patient currently inpatient at [place] [id number]    CARDIAC CATHETERIZATION      with stent    COLONOSCOPY N/A 03/27/2024    Procedure: COLONOSCOPY;  Surgeon: Ariela Castro MD;  Location: [facility];  Service: Endoscopy;  Laterality: N/A;    ESOPHAGEAL MANOMETRY WITH MEASUREMENT OF IMPEDANCE N/A 11/13/2024    Procedure: MANOMETRY, ESOPHAGUS, WITH IMPEDANCE MEASUREMENT;  Surgeon: Stephany Mendoza MD;  Location: [facility] (4TH FLR);  Service: Endoscopy;  Laterality: N/A;    ESOPHAGOGASTRODUODENOSCOPY N/A 03/27/2024    Procedure: EGD (ESOPHAGOGASTRODUODENOSCOPY);  Surgeon: Ariela Castro MD;  Location: [facility] ENDO;  Service: Endoscopy;  Laterality: N/A;    LEFT HEART CATHETERIZATION Left 9/10/2024    Procedure: Left heart cath;  Surgeon: Jemal Drummond MD;  Location: [facility] CATH LAB;  Service: Cardiology;  Laterality: Left;    SHOULDER SURGERY Right        Review of patient's allergies  "indicates:   Allergen Reactions    Dapagliflozin      Other reaction(s): Other (See Comments)    Pcn [penicillins]     Linagliptin Other (See Comments)     "it knocked me down", "it almost killed me"    Lisinopril Other (See Comments)     cough    Pantoprazole Hives       Medications:  Continuous Infusions:  Scheduled Meds:   amLODIPine  10 mg Oral Daily    arformoteroL  15 mcg Nebulization BID    aspirin  81 mg Oral Daily    budesonide  1 mg Nebulization BID    cefTRIAXone (Rocephin) IV (PEDS and ADULTS)  2 g Intravenous Q24H    enoxparin  40 mg Subcutaneous Daily    furosemide (LASIX) injection  40 mg Intravenous Daily    latanoprost  1 drop Both Eyes QHS    metoprolol succinate  50 mg Oral Daily    mycophenolate  1,000 mg Oral BID    nintedanib  150 mg Oral BID    oxybutynin  5 mg Oral Daily    pantoprazole  40 mg Oral Daily    sacubitriL-valsartan  1 tablet Oral BID    sucralfate  1 g Oral QID (AC & HS)     PRN Meds:  Current Facility-Administered Medications:     acetaminophen, 650 mg, Oral, Q8H PRN    acetaminophen, 650 mg, Oral, Q4H PRN    aluminum-magnesium hydroxide-simethicone, 30 mL, Oral, QID PRN    bisacodyL, 10 mg, Rectal, Daily PRN    dextromethorphan-guaiFENesin  mg/5 ml, 10 mL, Oral, Q4H PRN    famotidine, 20 mg, Oral, BID PRN    glucagon (human recombinant), 1 mg, Intramuscular, PRN    glucose, 16 g, Oral, PRN    glucose, 24 g, Oral, PRN    insulin aspart U-100, 0-5 Units, Subcutaneous, QID (AC + HS) PRN    magnesium oxide, 800 mg, Oral, PRN    magnesium oxide, 800 mg, Oral, PRN    melatonin, 6 mg, Oral, Nightly PRN    naloxone, 0.02 mg, Intravenous, PRN    ondansetron, 4 mg, Intravenous, Q8H PRN    potassium bicarbonate, 35 mEq, Oral, PRN    potassium bicarbonate, 50 mEq, Oral, PRN    potassium bicarbonate, 60 mEq, Oral, PRN    potassium, sodium phosphates, 2 packet, Oral, PRN    potassium, sodium phosphates, 2 packet, Oral, PRN    potassium, sodium phosphates, 2 packet, Oral, PRN    " prochlorperazine, 5 mg, Intravenous, Q6H PRN    senna-docusate, 1 tablet, Oral, Daily PRN    simethicone, 1 tablet, Oral, QID PRN    sodium chloride 0.9%, 10 mL, Intravenous, Q12H PRN    Family History       Problem Relation (Age of Onset)    COPD Brother    Cancer Mother    Colon cancer Sister    Heart disease Sister          Tobacco Use    Smoking status: Never     Passive exposure: Never    Smokeless tobacco: Never   Substance and Sexual Activity    Alcohol use: No    Drug use: Never    Sexual activity: Not Currently       Review of Systems   Constitutional:  Positive for activity change, appetite change (more related to SOB with eating and BOSTON with preparing meals) and fatigue.   Respiratory:  Positive for cough and shortness of breath (BOST[place] with minimal exertion).    Cardiovascular:  Negative for leg swelling (often an issue but not currently).   Neurological:  Positive for weakness.     Objective:     Vital Signs (Most Recent):  Temp: 97.8 °F (36.6 °C) (04/04/25 1226)  Pulse: 92 (04/04/25 1400)  Resp: 16 (04/04/25 1115)  BP: 125/80 (04/04/25 1400)  SpO2: 97 % (04/04/25 1400) Vital Signs (24h Range):  Temp:  [97.6 °F (36.4 °C)-97.8 °F (36.6 °C)] 97.8 °F (36.6 °C)  Pulse:  [] 92  Resp:  [16-42] 16  SpO2:  [94 %-100 %] 97 %  BP: (103-165)/() 125/80     Weight: 58.1 kg (128 lb)  Body mass index is 19.46 kg/m².       Physical Exam  Vitals and nursing note reviewed.   Constitutional:       Appearance: He is ill-appearing.   HENT:      Head: Normocephalic and atraumatic.   Pulmonary:      Effort: No respiratory distress.      Comments: Tachypnea and dyspnea that increases with prolonged talking, dyspnea resolved with pausing   Musculoskeletal:      Right lower leg: No edema.      Left lower leg: No edema.   Neurological:      Comments: Oriented to self, family, location, and relatively to time and situation; struggling with details and some memory of events in the past 24/hrs, but can recall basics of  events             Advance Care Planning   Advance Directives:   Living Will: No    LaPOST: No    Do Not Resuscitate Status: No    Medical Power of : No        Oral Declaration: Yes   Witnesses:  Dr. Ayanna OLIVAREZ   Agent's Name:  Emmanuel Grant Jr   Agent's Contact Number:  [phone]    Decision Making:  Patient answered questions  Goals of Care: What is most important right now is to focus on spending time at home, avoiding the hospital, remaining as independent as possible, symptom/pain control, quality of life, even if it means sacrificing a little time. Accordingly, we have decided that the best plan to meet the patient's goals includes continuing with treatment.         Significant Labs: All pertinent labs within the past 24 hours have been reviewed.  CBC:   Recent Labs   Lab 04/04/25  0452   WBC 8.13   HGB 13.0*   HCT 39.7*   MCV 84        BMP:  Recent Labs   Lab 04/04/25 0452   *   K 4.5      CO2 23   BUN 21   CREATININE 0.7   CALCIUM 7.9*   MG 1.6     LFT:  Lab Results   Component Value Date    AST 15 04/04/2025    ALKPHOS 50 04/04/2025    BILITOT 0.6 04/04/2025     Albumin:   Albumin   Date Value Ref Range Status   04/04/2025 2.9 (L) 3.5 - 5.2 g/dL Final   03/17/2025 2.9 (L) 3.5 - 5.2 g/dL Final     Protein:   Total Protein   Date Value Ref Range Status   03/17/2025 6.7 6.0 - 8.4 g/dL Final     Lactic acid:   Lab Results   Component Value Date    LACTATE 1.8 04/03/2025    LACTATE 2.5 (H) 04/03/2025       Significant Imaging: I have reviewed all pertinent imaging results/findings within the past 24 hours.

## 2025-04-04 NOTE — ED NOTES
"Assumed care of Pt at this time. Upon walking in to assess Pt d/t call light going off; Pt ripped off cardiac leads and removed IV access because "I needed to stand to urinate." Per previous RN, Pt has done this multiple times since arrival to ED room. Pt refuses external male catheter. Pt informed he will have to get stuck again for IV access and that he needs to utilize the call light if he needs assistance getting up that it is not acceptable for him to continuously remove medical equipment and take out IV access each time he needs to get up, Pt verbalized "I won't do anything bad anymore." Moved all required equipment to Rt side of bed d/t Pt favoring that side to get up and use the urinal.   "

## 2025-04-04 NOTE — ASSESSMENT & PLAN NOTE
Patient has Systolic (HFrEF) heart failure that is Chronic. On presentation their CHF was decompensated. Evidence of decompensated CHF on presentation includes: shortness of breath. Most recent BNP and echo results are listed below.  Recent Labs     04/03/25  1457   *     Latest ECHO  Results for orders placed during the hospital encounter of 04/03/25    Echo    Interpretation Summary    Left Ventricle: The left ventricle is normal in size. Normal wall thickness. Mild global hypokinesis present. There is mildly reduced systolic function with a visually estimated ejection fraction of 45 - 50%.    Right Ventricle: The right ventricle is normal in size. Systolic function is normal.    Aorta: Aortic root is mildly dilated measuring 3.82 cm.    Similar findings noted on prior report dated 3/17/25.    Current Heart Failure Medications  metoprolol succinate (TOPROL-XL) 24 hr tablet 50 mg, Daily, Oral  sacubitriL-valsartan 24-26 mg per tablet 1 tablet, 2 times daily, Oral  furosemide injection 40 mg, Daily, Intravenous    Plan  - Monitor strict I&Os and daily weights.    - Place on telemetry  - Low sodium diet  - will increase lasix to 40 mg daily

## 2025-04-04 NOTE — ASSESSMENT & PLAN NOTE
- pt with long term hx of IPF/ILD; pt and son with good insight into pt's condition   - discussed trajectory of life limiting condition with pt, along with Dr. Urena PCCM; also discussed with son by phone   - pt is consistently requiring hospital level care related to IPF on an increasing basis  - hospice qualifying; explained to pt that hospice would be a way to being care to him and allow for symptom management; pt with very limited reserve that is now inhibiting him from even making it to OP appts (See DM)   - PCCM following; Dr. Urena reviewed past and current CT imaging with pt, pt verbalized being able to see the worsening in the imaging   - cont mgmt her HM and PCCM

## 2025-04-04 NOTE — ASSESSMENT & PLAN NOTE
Patient with Hypoxic Respiratory failure which is Acute on chronic.  he is on home oxygen at 2 LPM. Supplemental oxygen was provided and noted-      .   Signs/symptoms of respiratory failure include- increased work of breathing and lethargy. Contributing diagnoses includes -  pulmonary fibrosis  Labs and images were reviewed. Patient Has recent ABG, which has been reviewed. Will treat underlying causes and adjust management of respiratory failure as follows-   Steroid + diuresis

## 2025-04-04 NOTE — ASSESSMENT & PLAN NOTE
Patient has Combined Systolic and Diastolic heart failure that is Chronic. Most recent BNP and echo results are listed below.  Recent Labs     04/03/25  1457   *     Latest ECHO  Results for orders placed during the hospital encounter of 04/03/25    Echo    Interpretation Summary    Left Ventricle: The left ventricle is normal in size. Normal wall thickness. Mild global hypokinesis present. There is mildly reduced systolic function with a visually estimated ejection fraction of 45 - 50%.    Right Ventricle: The right ventricle is normal in size. Systolic function is normal.    Aorta: Aortic root is mildly dilated measuring 3.82 cm.    Similar findings noted on prior report dated 3/17/25.    Current Heart Failure Medications  metoprolol succinate (TOPROL-XL) 24 hr tablet 50 mg, Daily, Oral  sacubitriL-valsartan 24-26 mg per tablet 1 tablet, 2 times daily, Oral  furosemide injection 40 mg, Daily, Intravenous    Plan  - Monitor strict I&Os and daily weights.    - Place on telemetry  - Low sodium diet  - Place on fluid restriction of 1.5 L.   - Cardiology has not been consulted  - The patient's volume status is at their baseline

## 2025-04-04 NOTE — ASSESSMENT & PLAN NOTE
Lab Results   Component Value Date    HGBA1C 7.8 (H) 01/02/2025     Glycemic protocol. [facility]

## 2025-04-04 NOTE — H&P
[facility] Medicine  History & Physical    Patient Name: Emmanuel Grant  MRN: [id number]  Patient Class: IP- Inpatient  Admission Date: 4/3/2025  Attending Physician: Macario Montgomery MD   Primary Care Provider: Wilfredo De Souza MD         Patient information was obtained from patient, past medical records, and ER records.     Subjective:     Principal Problem:IPF (idiopathic pulmonary fibrosis)    Chief Complaint:   Chief Complaint   Patient presents with    Shortness of Breath    Tachycardia     Pt from endocrinology clinic with SOB and tachycardia. Pt placed on 3L O2 at clinic sat 100%,  in triage. Hx of MI with stent placement.     Abdominal Pain     Pt also reports abd pain and HA         HPI: This is a 73-year-old male with a past medical history of ILD/pulmonary fibrosis (on 3L O2), CAD, HFrEF (EF: 40%), hypertension, type 2 diabetes, hyperlipidemia, CVA, BPH who presents with shortness of breath.     Patient presented to endocrinology clinic for routine follow-up, and was noted to be dyspneic.  He was referred to the ED for further evaluation. On my examination, patient was altered after receiving ativan, but reportedly he had worsening dyspnea, dry cough and abdominal pain. Of note, the patient had 3 hospitalizations over the last month: 1) 3/16-3/17, for ACS r/o, 2) 3/23-3/25 for acute on chronic hypoxia in the setting of ILD/HF, 3) 3/29-4/1 for ILD flare.     In the ED, the patient was tachycardic (120s-140s), tachypneic (20s-40s), was placed on 3 L O2.  Labs were remarkable for leukocytosis (15.2), elevated D-dimer (1.55), elevated BNP (115), elevated lactic acid (3.6  > 1.8), elevated procalcitonin (0.33), low TSH (0.143).  UDS was positive for opioids.  UA was unremarkable.  Chest x-ray showed chronic interstitial appearance, with no acute process.  CT abdomen and pelvis showed mild wall thickening/increased mucosal enhancement involving the gastric antral and pyloric  regions (possible acute gastritis).  Patient was given 500 mL of NS, vancomycin, cefepime, Benadryl 25 mg IV, Ativan 1 mg IV, morphine 4 mg IV, Zofran 4 mg IV, Carafate 1 g p.o..  He was admitted for further management.    Past Medical History:   Diagnosis Date    Acute hypoxic respiratory failure 09/23/2024    Oxygen therapy 24/7 now, continue 2 L oxygen therapy.      BPH (benign prostatic hyperplasia) 02/28/2024    CAD (coronary artery disease)     Sees [facility], h/o stent    Chronic HFrEF (heart failure with reduced ejection fraction) 05/01/2024    Diabetes mellitus     Hypertension     Kidney stone     Stroke     age 62    Type 2 diabetes mellitus without complication, without long-term current use of insulin 10/08/2021       Past Surgical History:   Procedure Laterality Date    24 HOUR IMPEDANCE PH MONITORING OF ESOPHAGUS IN PATIENT NOT TAKING ACID REDUCING MEDICATIONS N/A 11/13/2024    Procedure: IMPEDANCE PH STUDY, ESOPHAGEAL, 24 HOUR, IN PATIENT NOT TAKING ACID REDUCING MEDICATION;  Surgeon: Stephany Mendoza MD;  Location: [facility] (4TH FLR);  Service: Endoscopy;  Laterality: N/A;  referral dr miguel/ off ppi / prep inst sent to pt via mail  [facility]-  11/8 - pt not called, per chart review patient currently inpatient at [place] [address]    CARDIAC CATHETERIZATION      with stent    COLONOSCOPY N/A 03/27/2024    Procedure: COLONOSCOPY;  Surgeon: Ariela Castro MD;  Location: [facility];  Service: Endoscopy;  Laterality: N/A;    ESOPHAGEAL MANOMETRY WITH MEASUREMENT OF IMPEDANCE N/A 11/13/2024    Procedure: MANOMETRY, ESOPHAGUS, WITH IMPEDANCE MEASUREMENT;  Surgeon: Stephany Mendoza MD;  Location: [facility] (4TH FLR);  Service: Endoscopy;  Laterality: N/A;    ESOPHAGOGASTRODUODENOSCOPY N/A 03/27/2024    Procedure: EGD (ESOPHAGOGASTRODUODENOSCOPY);  Surgeon: Ariela Castro MD;  Location: [facility];  Service: Endoscopy;  Laterality: N/A;    LEFT HEART  "CATHETERIZATION Left 9/10/2024    Procedure: Left heart cath;  Surgeon: Jemal Drummond MD;  Location: [facility] CATH LAB;  Service: Cardiology;  Laterality: Left;    SHOULDER SURGERY Right        Review of patient's allergies indicates:   Allergen Reactions    Dapagliflozin      Other reaction(s): Other (See Comments)    Pcn [penicillins]     Linagliptin Other (See Comments)     "it knocked me down", "it almost killed me"    Lisinopril Other (See Comments)     cough    Pantoprazole Hives       No current facility-administered medications on file prior to encounter.     Current Outpatient Medications on File Prior to Encounter   Medication Sig    albuterol-ipratropium (DUO-NEB) 2.5 mg-0.5 mg/3 mL nebulizer solution Take 3 mLs by nebulization every 4 (four) hours. Rescue    amLODIPine (NORVASC) 10 MG tablet Take 1 tablet (10 mg total) by mouth once daily.    arformoteroL (BROVANA) 15 mcg/2 mL Nebu Take 2 mLs (15 mcg total) by nebulization 2 (two) times daily. Controller    aspirin 81 MG Chew Take 81 mg by mouth once daily.    benzonatate (TESSALON) 200 MG capsule Take 1 capsule (200 mg total) by mouth 3 (three) times daily as needed.    budesonide (PULMICORT) 0.5 mg/2 mL nebulizer solution Take 4 mLs (1 mg total) by nebulization 2 (two) times daily. Controller    cholecalciferol, vitamin D3, (VITAMIN D3) 50 mcg (2,000 unit) Cap capsule Take 1 capsule by mouth once daily.    dextromethorphan-guaiFENesin  mg/5 ml (ROBITUSSIN-DM)  mg/5 mL liquid Take 10 mLs by mouth every 4 (four) hours as needed (cough).    famotidine (PEPCID) 20 MG tablet Take 1 tablet (20 mg total) by mouth 2 (two) times daily as needed for Heartburn.    furosemide (LASIX) 20 MG tablet Take 1 tablet (20 mg total) by mouth once daily.    glimepiride (AMARYL) 4 MG tablet Take 1 tablet (4 mg total) by mouth before breakfast.    insulin aspart U-100 (NOVOLOG FLEXPEN U-100 INSULIN) 100 unit/mL (3 mL) InPn pen Inject as needed before meals: " "180-230=+1, 231-280=+2, 281-330=+3, 331-380=+4, over 380=+5 units    latanoprost, PF, 0.005 % Drop 1 drop into affected eye in the evening Ophthalmic Once a day    metFORMIN (GLUCOPHAGE-XR) 500 MG ER 24hr tablet Take 1 tablet (500 mg total) by mouth 2 (two) times daily with meals.    metoprolol succinate (TOPROL-XL) 50 MG 24 hr tablet Take 1 tablet (50 mg total) by mouth once daily.    mirabegron (MYRBETRIQ) 25 mg Tb24 ER tablet Take 1 tablet (25 mg total) by mouth once daily.    mycophenolate (CELLCEPT) 250 mg Cap Take 4 capsules (1,000 mg total) by mouth 2 (two) times daily.    nintedanib (OFEV) 150 mg Cap Take 1 capsule (150 mg total) by mouth 2 (two) times a day.    nitroGLYCERIN (NITROSTAT) 0.3 MG SL tablet Place 1 tablet (0.3 mg total) under the tongue every 5 (five) minutes as needed for Chest pain.    omeprazole (PRILOSEC) 40 MG capsule Take 1 capsule (40 mg total) by mouth once daily.    ONETOUCH DELICA PLUS LANCET 33 gauge Misc Apply topically 3 (three) times daily.    pen needle, diabetic 32 gauge x 5/32" Ndle 1 each by Misc.(Non-Drug; Combo Route) route once daily.    predniSONE (DELTASONE) 10 MG tablet Take 4 tablets (40 mg total) by mouth once daily for 5 days, THEN 2 tablets (20 mg total) once daily for 5 days, THEN 1 tablet (10 mg total) once daily for 5 days, THEN 0.5 tablets (5 mg total) once daily for 5 days. Take 40 mg for 5 days, 20 mg for 5 days and 10 mg for 5 day.    rosuvastatin (CRESTOR) 20 MG tablet Take 1 tablet (20 mg total) by mouth once daily.    sacubitriL-valsartan (ENTRESTO) 24-26 mg per tablet Take 1 tablet by mouth 2 (two) times daily.    simethicone (MYLICON) 80 MG chewable tablet Take 1 tablet (80 mg total) by mouth 3 (three) times daily as needed for Flatulence.    sucralfate (CARAFATE) 1 gram tablet Take 1 tablet (1 g total) by mouth 4 (four) times daily before meals and nightly.    [DISCONTINUED] metFORMIN (GLUCOPHAGE-XR) 500 MG ER 24hr tablet Take 1 tablet (500 mg total) by " mouth 2 (two) times daily with meals.    [DISCONTINUED] sulfamethoxazole-trimethoprim 800-160mg (BACTRIM DS) 800-160 mg Tab Take 1 tablet by mouth once daily.     Family History       Problem Relation (Age of Onset)    COPD Brother    Cancer Mother    Colon cancer Sister    Heart disease Sister          Tobacco Use    Smoking status: Never     Passive exposure: Never    Smokeless tobacco: Never   Substance and Sexual Activity    Alcohol use: No    Drug use: Never    Sexual activity: Not Currently     Review of Systems   Unable to perform ROS: Mental status change   - after receiving ativan   Objective:     Vital Signs (Most Recent):  Temp: 98 °F (36.7 °C) (04/03/25 1409)  Pulse: (!) 126 (04/03/25 2047)  Resp: (!) 22 (04/03/25 2047)  BP: 121/83 (04/03/25 2047)  SpO2: 98 % (04/03/25 2047) Vital Signs (24h Range):  Temp:  [98 °F (36.7 °C)-98.7 °F (37.1 °C)] 98 °F (36.7 °C)  Pulse:  [116-149] 126  Resp:  [21-42] 22  SpO2:  [94 %-100 %] 98 %  BP: (118-165)/() 121/83     Weight: 58.3 kg (128 lb 9.5 oz)  Body mass index is 19.55 kg/m².     Physical Exam  Vitals and nursing note reviewed.   Constitutional:       General: He is not in acute distress.     Appearance: He is not ill-appearing.   HENT:      Mouth/Throat:      Mouth: Mucous membranes are moist.   Cardiovascular:      Rate and Rhythm: Tachycardia present.   Pulmonary:      Effort: Pulmonary effort is normal.      Breath sounds: Rhonchi present.   Abdominal:      General: Abdomen is flat.   Skin:     General: Skin is warm.   Neurological:      Mental Status: He is alert.      Comments: Lethargic appearing after ativan   Arousable to sternal rub/voice                 Significant Labs: All pertinent labs within the past 24 hours have been reviewed.    Significant Imaging: I have reviewed all pertinent imaging results/findings within the past 24 hours.  Assessment/Plan:     Assessment & Plan  IPF (idiopathic pulmonary fibrosis)  Continue CellCept, Ofev  At  baseline O2, less likely ILD exacerbation   Continue prednisone taper  Consult pulmonary   Consult palliative care     Type 2 diabetes mellitus without complication, without long-term current use of insulin  Lab Results   Component Value Date    HGBA1C 7.8 (H) 01/02/2025     Glycemic protocol. LDSS    Coronary artery disease involving native coronary artery of native heart with angina pectoris  No acute issues.  Essential hypertension  Patient's blood pressure range in the last 24 hours was: BP  Min: 118/83  Max: 165/102.The patient's inpatient anti-hypertensive regimen is listed below:  Current Antihypertensives  amLODIPine tablet 10 mg, Daily, Oral  furosemide tablet 20 mg, Daily, Oral  metoprolol succinate (TOPROL-XL) 24 hr tablet 50 mg, Daily, Oral    Plan  - BP is controlled, no changes needed to their regimen  H/O: CVA (cerebrovascular accident)  History noted.  Continue home medications.    Chronic hypoxemic respiratory failure  Patient with Hypoxic Respiratory failure which is Chronic.  he is on home oxygen at 3 LPM. Supplemental oxygen was provided and noted-      .   Signs/symptoms of respiratory failure include- increased work of breathing. Contributing diagnoses includes -  ILD - possibly PE  Labs and images were reviewed. Patient Has recent ABG, which has been reviewed. Will treat underlying causes and adjust management of respiratory failure as follows-     Solumedrol 125 mg IV x1. Continue steroid taper   Obtain CTA chest to rule out PE, given persistent tachycardia   Sinus tachycardia  Less likely sepsis.   CTA chest ordered  Echocardiogram ordered   Gastritis  Continue Protonix, Carafate    VTE Risk Mitigation (From admission, onward)           Ordered     enoxaparin injection 40 mg  Daily         04/03/25 2055     IP VTE HIGH RISK PATIENT  Once         04/03/25 2055     Place sequential compression device  Until discontinued         04/03/25 2055                                    Noe Rey  MD  Department of Hospital Medicine  [facility] [place] - Emergency Dept

## 2025-04-04 NOTE — ED NOTES
Pt appears extremely anxious. Requesting too ambulate w/ RN around hospital despite obvious SOB and tachypnea at >40. Pt educated that it is not in his best interest to ambulate at this current time d/t respiratory distress but is addiment about standing up and walking around bed despite SOB. MD made aware, anxiolytic to be placed.

## 2025-04-04 NOTE — ASSESSMENT & PLAN NOTE
Patient's blood pressure range in the last 24 hours was: BP  Min: 102/73  Max: 165/102.The patient's inpatient anti-hypertensive regimen is listed below:  Current Antihypertensives  amLODIPine tablet 10 mg, Daily, Oral  metoprolol succinate (TOPROL-XL) 24 hr tablet 50 mg, Daily, Oral  furosemide injection 40 mg, Daily, Intravenous    Plan  - BP is controlled, no changes needed to their regimen

## 2025-04-04 NOTE — HPI
This is a 73-year-old male with a past medical history of ILD/pulmonary fibrosis (on 3L O2), CAD, HFrEF (EF: 40%), hypertension, type 2 diabetes, hyperlipidemia, CVA, BPH who presents with shortness of breath.     Patient presented to endocrinology clinic for routine follow-up, and was noted to be dyspneic.  He was referred to the ED for further evaluation. On my examination, patient was altered after receiving ativan, but reportedly he had worsening dyspnea, dry cough and abdominal pain. Of note, the patient had 3 hospitalizations over the last month: 1) 3/16-3/17, for ACS r/o, 2) 3/23-3/25 for acute on chronic hypoxia in the setting of ILD/HF, 3) 3/29-4/1 for ILD flare.     In the ED, the patient was tachycardic (120s-140s), tachypneic (20s-40s), was placed on 3 L O2.  Labs were remarkable for leukocytosis (15.2), elevated D-dimer (1.55), elevated BNP (115), elevated lactic acid (3.6  > 1.8), elevated procalcitonin (0.33), low TSH (0.143).  UDS was positive for opioids.  UA was unremarkable.  Chest x-ray showed chronic interstitial appearance, with no acute process.  CT abdomen and pelvis showed mild wall thickening/increased mucosal enhancement involving the gastric antral and pyloric regions (possible acute gastritis).  Patient was given 500 mL of NS, vancomycin, cefepime, Benadryl 25 mg IV, Ativan 1 mg IV, morphine 4 mg IV, Zofran 4 mg IV, Carafate 1 g p.o..  He was admitted for further management.

## 2025-04-04 NOTE — ASSESSMENT & PLAN NOTE
Lab Results   Component Value Date    HGBA1C 7.8 (H) 01/02/2025     Glycemic protocol. [facility]

## 2025-04-04 NOTE — HPI
Patient is 74 y.o. male  has a past medical history of Acute hypoxic respiratory failure (09/23/2024), BPH (benign prostatic hyperplasia) (02/28/2024), CAD (coronary artery disease), Chronic HFrEF (heart failure with reduced ejection fraction) (05/01/2024), Diabetes mellitus, Hypertension, Kidney stone, Stroke, and Type 2 diabetes mellitus without complication, without long-term current use of insulin (10/08/2021). presented to [facility] on 4/3/25 with worsening sob and tachycardia.  Patient presented to endocrinology clinic for routine follow-up, and was noted to be dyspneic and confused. He was referred to the ED for further evaluation.     In the ED, the patient was tachycardic (120s-140s), tachypneic (20s-40s), was placed on 3 L O2. Labs were remarkable for leukocytosis (15.2), elevated D-dimer (1.55), elevated BNP (115), elevated lactic acid (3.6 > 1.8), elevated procalcitonin (0.33), low TSH (0.143). UDS was positive for opioids. UA was unremarkable. Chest x-ray showed chronic interstitial appearance, with no acute process. CT abdomen and pelvis showed mild wall thickening/increased mucosal enhancement involving the gastric antral and pyloric regions (possible acute gastritis). Patient was given 500 mL of NS, vancomycin, cefepime, Benadryl 25 mg IV, Ativan 1 mg IV, morphine 4 mg IV, Zofran 4 mg IV, Carafate 1 g p.o. CTA without central pe and chronic uip pattern.  Patient was restarted back on cellcept and low dose prednisone.   Pulmonary was consulted for further inputs.    During my initial evaluation, patient is alert and interactive on nasal canula.  Vss.      Patient is followed by Dr. Schmitt and Palma as an outpatient for IPF.  He was [facility] by [facility].  Patient was started on OFEV along with cellcept along with weaning regimen of prednisone.      Additional Pulmonary History:  Childhood Illnesses:  none  Occupational:   works in air conditioning in installation and repair  Environmental:    no pets, no seasonal allergies, no carpet in his home and no concern for mold or allergy exposures there  Tobacco/Smoking:   never smoker

## 2025-04-04 NOTE — PROGRESS NOTES
Repeat CT to r/o PE per order physician Krissy. Pt appears to be in pain with high suspicion for PE due to condition/elevated DDimer. Dr tomas aware of previous CT w/ contrast. I will use lowest dose possible. Critical care pt.

## 2025-04-04 NOTE — PLAN OF CARE
Problem: Adult Inpatient Plan of Care  Goal: Patient-Specific Goal (Individualized)  Outcome: Progressing  Flowsheets (Taken 4/4/2025 1844)  Individualized Care Needs: oxygen  Anxieties, Fears or Concerns: breathing  Patient/Family-Specific Goals (Include Timeframe): breathing     Problem: Adult Inpatient Plan of Care  Goal: Absence of Hospital-Acquired Illness or Injury  Intervention: Identify and Manage Fall Risk  Flowsheets (Taken 4/4/2025 1844)  Safety Promotion/Fall Prevention:   assistive device/personal item within reach   bed alarm set   supervised activity   side rails raised x 2   Fall Risk signage in place   high risk medications identified   medications reviewed   nonskid shoes/socks when out of bed   observed patient noncompliance with fall prevention instructions   Supervised toileting - stay within arms reach   toileting scheduled     Problem: Adult Inpatient Plan of Care  Goal: Absence of Hospital-Acquired Illness or Injury  Intervention: Prevent Skin Injury  Flowsheets (Taken 4/4/2025 1844)  Body Position: position changed independently  Skin Protection: incontinence pads utilized  Device Skin Pressure Protection: tubing/devices free from skin contact     Problem: Fall Injury Risk  Goal: Absence of Fall and Fall-Related Injury  Outcome: Progressing  Intervention: Identify and Manage Contributors  Flowsheets (Taken 4/4/2025 1849)  Self-Care Promotion: BADL personal objects within reach  Medication Review/Management:   medications reviewed   high-risk medications identified  Intervention: Promote Injury-Free Environment  Flowsheets (Taken 4/4/2025 1849)  Safety Promotion/Fall Prevention:   assistive device/personal item within reach   bed alarm set   side rails raised x 2   room near unit station   nonskid shoes/socks when out of bed   Fall Risk signage in place   Fall Risk reviewed with patient/family   diversional activities provided   commode/urinal/bedpan at bedside   instructed to call staff  for mobility   lighting adjusted   observed patient noncompliance with fall prevention instructions   Supervised toileting - stay within arms reach      SCDs

## 2025-04-04 NOTE — SUBJECTIVE & OBJECTIVE
Past Medical History:   Diagnosis Date    Acute hypoxic respiratory failure 09/23/2024    Oxygen therapy 24/7 now, continue 2 L oxygen therapy.      BPH (benign prostatic hyperplasia) 02/28/2024    CAD (coronary artery disease)     Sees [facility], h/o stent    Chronic HFrEF (heart failure with reduced ejection fraction) 05/01/2024    Diabetes mellitus     Hypertension     Kidney stone     Stroke     age 62    Type 2 diabetes mellitus without complication, without long-term current use of insulin 10/08/2021       Past Surgical History:   Procedure Laterality Date    24 HOUR IMPEDANCE PH MONITORING OF ESOPHAGUS IN PATIENT NOT TAKING ACID REDUCING MEDICATIONS N/A 11/13/2024    Procedure: IMPEDANCE PH STUDY, ESOPHAGEAL, 24 HOUR, IN PATIENT NOT TAKING ACID REDUCING MEDICATION;  Surgeon: Stephany Mendoza MD;  Location: [facility] (4TH FLR);  Service: Endoscopy;  Laterality: N/A;  referral dr miguel/ off ppi / prep inst sent to pt via mail  11/6- precall attempted no answer. Unable to [facility]-  11/8 - pt not called, per chart review patient currently inpatient at [place] [id number]    CARDIAC CATHETERIZATION      with stent    COLONOSCOPY N/A 03/27/2024    Procedure: COLONOSCOPY;  Surgeon: Ariela Castro MD;  Location: [facility];  Service: Endoscopy;  Laterality: N/A;    ESOPHAGEAL MANOMETRY WITH MEASUREMENT OF IMPEDANCE N/A 11/13/2024    Procedure: MANOMETRY, ESOPHAGUS, WITH IMPEDANCE MEASUREMENT;  Surgeon: Stephany Mendoza MD;  Location: [facility] (4TH FLR);  Service: Endoscopy;  Laterality: N/A;    ESOPHAGOGASTRODUODENOSCOPY N/A 03/27/2024    Procedure: EGD (ESOPHAGOGASTRODUODENOSCOPY);  Surgeon: Ariela Castro MD;  Location: [facility] ENDO;  Service: Endoscopy;  Laterality: N/A;    LEFT HEART CATHETERIZATION Left 9/10/2024    Procedure: Left heart cath;  Surgeon: Jemal Drummond MD;  Location: [facility] CATH LAB;  Service: Cardiology;  Laterality: Left;    SHOULDER SURGERY Right        Review of patient's allergies  "indicates:   Allergen Reactions    Dapagliflozin      Other reaction(s): Other (See Comments)    Pcn [penicillins]     Linagliptin Other (See Comments)     "it knocked me down", "it almost killed me"    Lisinopril Other (See Comments)     cough    Pantoprazole Hives       No current facility-administered medications on file prior to encounter.     Current Outpatient Medications on File Prior to Encounter   Medication Sig    albuterol-ipratropium (DUO-NEB) 2.5 mg-0.5 mg/3 mL nebulizer solution Take 3 mLs by nebulization every 4 (four) hours. Rescue    amLODIPine (NORVASC) 10 MG tablet Take 1 tablet (10 mg total) by mouth once daily.    arformoteroL (BROVANA) 15 mcg/2 mL Nebu Take 2 mLs (15 mcg total) by nebulization 2 (two) times daily. Controller    aspirin 81 MG Chew Take 81 mg by mouth once daily.    benzonatate (TESSALON) 200 MG capsule Take 1 capsule (200 mg total) by mouth 3 (three) times daily as needed.    budesonide (PULMICORT) 0.5 mg/2 mL nebulizer solution Take 4 mLs (1 mg total) by nebulization 2 (two) times daily. Controller    cholecalciferol, vitamin D3, (VITAMIN D3) 50 mcg (2,000 unit) Cap capsule Take 1 capsule by mouth once daily.    dextromethorphan-guaiFENesin  mg/5 ml (ROBITUSSIN-DM)  mg/5 mL liquid Take 10 mLs by mouth every 4 (four) hours as needed (cough).    famotidine (PEPCID) 20 MG tablet Take 1 tablet (20 mg total) by mouth 2 (two) times daily as needed for Heartburn.    furosemide (LASIX) 20 MG tablet Take 1 tablet (20 mg total) by mouth once daily.    glimepiride (AMARYL) 4 MG tablet Take 1 tablet (4 mg total) by mouth before breakfast.    insulin aspart U-100 (NOVOLOG FLEXPEN U-100 INSULIN) 100 unit/mL (3 mL) InPn pen Inject as needed before meals: 180-230=+1, 231-280=+2, 281-330=+3, 331-380=+4, over 380=+5 units    latanoprost, PF, 0.005 % Drop 1 drop into affected eye in the evening Ophthalmic Once a day    metFORMIN (GLUCOPHAGE-XR) 500 MG ER 24hr tablet Take 1 tablet (500 " "mg total) by mouth 2 (two) times daily with meals.    metoprolol succinate (TOPROL-XL) 50 MG 24 hr tablet Take 1 tablet (50 mg total) by mouth once daily.    mirabegron (MYRBETRIQ) 25 mg Tb24 ER tablet Take 1 tablet (25 mg total) by mouth once daily.    mycophenolate (CELLCEPT) 250 mg Cap Take 4 capsules (1,000 mg total) by mouth 2 (two) times daily.    nintedanib (OFEV) 150 mg Cap Take 1 capsule (150 mg total) by mouth 2 (two) times a day.    nitroGLYCERIN (NITROSTAT) 0.3 MG SL tablet Place 1 tablet (0.3 mg total) under the tongue every 5 (five) minutes as needed for Chest pain.    omeprazole (PRILOSEC) 40 MG capsule Take 1 capsule (40 mg total) by mouth once daily.    ONETOUCH DELICA PLUS LANCET 33 gauge Misc Apply topically 3 (three) times daily.    pen needle, diabetic 32 gauge x 5/32" Ndle 1 each by Misc.(Non-Drug; Combo Route) route once daily.    predniSONE (DELTASONE) 10 MG tablet Take 4 tablets (40 mg total) by mouth once daily for 5 days, THEN 2 tablets (20 mg total) once daily for 5 days, THEN 1 tablet (10 mg total) once daily for 5 days, THEN 0.5 tablets (5 mg total) once daily for 5 days. Take 40 mg for 5 days, 20 mg for 5 days and 10 mg for 5 day.    rosuvastatin (CRESTOR) 20 MG tablet Take 1 tablet (20 mg total) by mouth once daily.    sacubitriL-valsartan (ENTRESTO) 24-26 mg per tablet Take 1 tablet by mouth 2 (two) times daily.    simethicone (MYLICON) 80 MG chewable tablet Take 1 tablet (80 mg total) by mouth 3 (three) times daily as needed for Flatulence.    sucralfate (CARAFATE) 1 gram tablet Take 1 tablet (1 g total) by mouth 4 (four) times daily before meals and nightly.    [DISCONTINUED] metFORMIN (GLUCOPHAGE-XR) 500 MG ER 24hr tablet Take 1 tablet (500 mg total) by mouth 2 (two) times daily with meals.    [DISCONTINUED] sulfamethoxazole-trimethoprim 800-160mg (BACTRIM DS) 800-160 mg Tab Take 1 tablet by mouth once daily.     Family History       Problem Relation (Age of Onset)    COPD Brother "    Cancer Mother    Colon cancer Sister    Heart disease Sister          Tobacco Use    Smoking status: Never     Passive exposure: Never    Smokeless tobacco: Never   Substance and Sexual Activity    Alcohol use: No    Drug use: Never    Sexual activity: Not Currently     Review of Systems   Unable to perform ROS: Mental status change   - after receiving ativan   Objective:     Vital Signs (Most Recent):  Temp: 98 °F (36.7 °C) (04/03/25 1409)  Pulse: (!) 126 (04/03/25 2047)  Resp: (!) 22 (04/03/25 2047)  BP: 121/83 (04/03/25 2047)  SpO2: 98 % (04/03/25 2047) Vital Signs (24h Range):  Temp:  [98 °F (36.7 °C)-98.7 °F (37.1 °C)] 98 °F (36.7 °C)  Pulse:  [116-149] 126  Resp:  [21-42] 22  SpO2:  [94 %-100 %] 98 %  BP: (118-165)/() 121/83     Weight: 58.3 kg (128 lb 9.5 oz)  Body mass index is 19.55 kg/m².     Physical Exam  Vitals and nursing note reviewed.   Constitutional:       General: He is not in acute distress.     Appearance: He is not ill-appearing.   HENT:      Mouth/Throat:      Mouth: Mucous membranes are moist.   Cardiovascular:      Rate and Rhythm: Tachycardia present.   Pulmonary:      Effort: Pulmonary effort is normal.      Breath sounds: Rhonchi present.   Abdominal:      General: Abdomen is flat.   Skin:     General: Skin is warm.   Neurological:      Mental Status: He is alert.      Comments: Lethargic appearing after ativan   Arousable to sternal rub/voice                 Significant Labs: All pertinent labs within the past 24 hours have been reviewed.    Significant Imaging: I have reviewed all pertinent imaging results/findings within the past 24 hours.

## 2025-04-04 NOTE — SUBJECTIVE & OBJECTIVE
Interval History:  No acute event overnight.  Supplemental oxygen requirement improving with steroids.  Noted radiographic worsening of chronic combined on CT compared to previous imaging.    Review of Systems   Constitutional:  Positive for fatigue. Negative for fever.   Respiratory:  Positive for cough, shortness of breath and wheezing.      Objective:     Vital Signs (Most Recent):  Temp: 97.3 °F (36.3 °C) (04/04/25 1614)  Pulse: 96 (04/04/25 1614)  Resp: 18 (04/04/25 1614)  BP: 126/86 (04/04/25 1614)  SpO2: 99 % (04/04/25 1614) Vital Signs (24h Range):  Temp:  [97.3 °F (36.3 °C)-97.8 °F (36.6 °C)] 97.3 °F (36.3 °C)  Pulse:  [] 96  Resp:  [16-42] 18  SpO2:  [94 %-100 %] 99 %  BP: (102-165)/() 126/86     Weight: 58.1 kg (128 lb)  Body mass index is 19.46 kg/m².  No intake or output data in the 24 hours ending 04/04/25 1705      Physical Exam  Constitutional:       General: He is in acute distress.      Appearance: He is ill-appearing. He is not toxic-appearing or diaphoretic.   Cardiovascular:      Rate and Rhythm: Tachycardia present. Rhythm irregular.      Pulses: Normal pulses.      Heart sounds: Normal heart sounds.   Pulmonary:      Effort: No respiratory distress.      Breath sounds: No stridor. Wheezing and rhonchi present.      Comments: Increased work of breathing with conversational dyspnea  Abdominal:      General: Abdomen is flat. There is no distension.      Palpations: Abdomen is soft. There is mass.   Neurological:      General: No focal deficit present.      Mental Status: He is oriented to person, place, and time.               Significant Labs: CBC:   Recent Labs   Lab 04/03/25  1457 04/04/25  0452   WBC 15.29* 8.13   HGB 16.1 13.0*   HCT 47.9 39.7*    212     CMP:   Recent Labs   Lab 04/03/25  1457 04/04/25  0452    135*   K 5.0 4.5    103   CO2 18* 23   BUN 27* 21   CREATININE 1.0 0.7   CALCIUM 9.4 7.9*   ALBUMIN 3.9 2.9*   BILITOT 0.8 0.6   ALKPHOS 69 50   AST 33  15   ALT 29 19   ANIONGAP 15 9       Significant Imaging:   CT Head Without Contrast   Final Result      No acute intracranial abnormalities identified.         Electronically signed by: Brody Mejia MD   Date:    04/03/2025   Time:    23:42      CTA Chest Non-Coronary (PE Studies)   Final Result      1. No evidence of PE.   2. Chronic interstitial lung disease/pulmonary fibrosis.         Electronically signed by: Brody Mejia MD   Date:    04/03/2025   Time:    22:00      CT Abdomen Pelvis With IV Contrast NO Oral Contrast   Final Result      1. Mild wall thickening with relative increased mucosal enhancement involving the gastric antral and pyloric region which can be seen in the setting of acute gastritis.   2. No additional acute intra-abdominal abnormalities identified.   3. Chronic interstitial lung disease/pulmonary fibrosis.   4. Multiple additional findings as detailed above.         Electronically signed by: Brody Mejia MD   Date:    04/03/2025   Time:    17:42      X-Ray Chest AP Portable   Final Result      Chronic interstitial lung disease/pulmonary fibrosis.  No superimposed acute cardiopulmonary process identified.  No significant change.         Electronically signed by: Brody Mejia MD   Date:    04/03/2025   Time:    17:10

## 2025-04-04 NOTE — HPI
"From H&P: "This is a 73-year-old male with a past medical history of ILD/pulmonary fibrosis (on 3L O2), CAD, HFrEF (EF: 40%), hypertension, type 2 diabetes, hyperlipidemia, CVA, BPH who presents with shortness of breath.      Patient presented to endocrinology clinic for routine follow-up, and was noted to be dyspneic.  He was referred to the ED for further evaluation. On my examination, patient was altered after receiving ativan, but reportedly he had worsening dyspnea, dry cough and abdominal pain. Of note, the patient had 3 hospitalizations over the last month: 1) 3/16-3/17, for ACS r/o, 2) 3/23-3/25 for acute on chronic hypoxia in the setting of ILD/HF, 3) 3/29-4/1 for ILD flare.      In the ED, the patient was tachycardic (120s-140s), tachypneic (20s-40s), was placed on 3 L O2.  Labs were remarkable for leukocytosis (15.2), elevated D-dimer (1.55), elevated BNP (115), elevated lactic acid (3.6  > 1.8), elevated procalcitonin (0.33), low TSH (0.143).  UDS was positive for opioids.  UA was unremarkable.  Chest x-ray showed chronic interstitial appearance, with no acute process.  CT abdomen and pelvis showed mild wall thickening/increased mucosal enhancement involving the gastric antral and pyloric regions (possible acute gastritis).  Patient was given 500 mL of NS, vancomycin, cefepime, Benadryl 25 mg IV, Ativan 1 mg IV, morphine 4 mg IV, Zofran 4 mg IV, Carafate 1 g p.o..  He was admitted for further management."     Palliative medicine consulted for goals of care discussion, continuity of care, and advance care planning; for details of visit, see advance care planning section of plan.     "

## 2025-04-04 NOTE — ASSESSMENT & PLAN NOTE
- chronic condition noted  - contributes to pt's overall condition and hospice qualification; contributes to pt's lack of reserve   - echo's have been relatively stable with current EF 50-45%  - discussed trajectory of life-limiting condition

## 2025-04-04 NOTE — ASSESSMENT & PLAN NOTE
Given patient progressing dyspnea and fibrosis on CT, During this visit, I engaged the patient in the advance care planning process along with ANABELA Glaser.  The patient and I reviewed the role for advance directives and their purpose in directing future healthcare if the patient's unable to speak for him/herself.  At this point in time, the patient borderline decision-making capacity.  We discussed different extreme health states that patient could experience, and reviewed what kind of medical care patient would want in those situations.  The patient communicated that if he was comatose and had little chance of a meaningful recovery, he would is not sure if he would want machines/life-sustaining treatments used.  In addition to the above preference, patient  HAS NOT completed a living will to reflect these preferences.  The patient HAS designated his son, Nikos Grant, as healthcare power of  to make decisions on her behalf.  In the case of cardiopulmonary arrest, patient is not sure if would want CPR, intubation or cardioversion.  At time, patient seems overwhelm and confused.  Will continue with further conversation in the company of family.      16 minutes spent discussing GOC.

## 2025-04-05 LAB
ABSOLUTE EOSINOPHIL (OHS): 0.09 K/UL
ABSOLUTE MONOCYTE (OHS): 0.95 K/UL (ref 0.3–1)
ABSOLUTE NEUTROPHIL COUNT (OHS): 9.91 K/UL (ref 1.8–7.7)
ALBUMIN SERPL BCP-MCNC: 3 G/DL (ref 3.5–5.2)
ALP SERPL-CCNC: 53 UNIT/L (ref 40–150)
ALT SERPL W/O P-5'-P-CCNC: 25 UNIT/L (ref 10–44)
ANION GAP (OHS): 11 MMOL/L (ref 8–16)
AST SERPL-CCNC: 23 UNIT/L (ref 11–45)
BASOPHILS # BLD AUTO: 0.01 K/UL
BASOPHILS NFR BLD AUTO: 0.1 %
BILIRUB SERPL-MCNC: 0.3 MG/DL (ref 0.1–1)
BUN SERPL-MCNC: 28 MG/DL (ref 8–23)
CALCIUM SERPL-MCNC: 8.3 MG/DL (ref 8.7–10.5)
CHLORIDE SERPL-SCNC: 102 MMOL/L (ref 95–110)
CO2 SERPL-SCNC: 23 MMOL/L (ref 23–29)
CREAT SERPL-MCNC: 0.8 MG/DL (ref 0.5–1.4)
ERYTHROCYTE [DISTWIDTH] IN BLOOD BY AUTOMATED COUNT: 15.1 % (ref 11.5–14.5)
GFR SERPLBLD CREATININE-BSD FMLA CKD-EPI: >60 ML/MIN/1.73/M2
GLUCOSE SERPL-MCNC: 180 MG/DL (ref 70–110)
HCT VFR BLD AUTO: 41.7 % (ref 40–54)
HGB BLD-MCNC: 13.6 GM/DL (ref 14–18)
IMM GRANULOCYTES # BLD AUTO: 0.05 K/UL (ref 0–0.04)
IMM GRANULOCYTES NFR BLD AUTO: 0.4 % (ref 0–0.5)
LYMPHOCYTES # BLD AUTO: 0.8 K/UL (ref 1–4.8)
MAGNESIUM SERPL-MCNC: 1.8 MG/DL (ref 1.6–2.6)
MCH RBC QN AUTO: 27.3 PG (ref 27–31)
MCHC RBC AUTO-ENTMCNC: 32.6 G/DL (ref 32–36)
MCV RBC AUTO: 84 FL (ref 82–98)
NUCLEATED RBC (/100WBC) (OHS): 0 /100 WBC
PHOSPHATE SERPL-MCNC: 3 MG/DL (ref 2.7–4.5)
PLATELET # BLD AUTO: 220 K/UL (ref 150–450)
PMV BLD AUTO: 10.7 FL (ref 9.2–12.9)
POCT GLUCOSE: 242 MG/DL (ref 70–110)
POCT GLUCOSE: 336 MG/DL (ref 70–110)
POCT GLUCOSE: 388 MG/DL (ref 70–110)
POTASSIUM SERPL-SCNC: 4.6 MMOL/L (ref 3.5–5.1)
PROT SERPL-MCNC: 6.6 GM/DL (ref 6–8.4)
RBC # BLD AUTO: 4.98 M/UL (ref 4.6–6.2)
RELATIVE EOSINOPHIL (OHS): 0.8 %
RELATIVE LYMPHOCYTE (OHS): 6.8 % (ref 18–48)
RELATIVE MONOCYTE (OHS): 8 % (ref 4–15)
RELATIVE NEUTROPHIL (OHS): 83.9 % (ref 38–73)
SODIUM SERPL-SCNC: 136 MMOL/L (ref 136–145)
WBC # BLD AUTO: 11.81 K/UL (ref 3.9–12.7)

## 2025-04-05 PROCEDURE — 63600175 PHARM REV CODE 636 W HCPCS: Mod: HCNC | Performed by: INTERNAL MEDICINE

## 2025-04-05 PROCEDURE — 25000003 PHARM REV CODE 250: Mod: HCNC | Performed by: INTERNAL MEDICINE

## 2025-04-05 PROCEDURE — 85025 COMPLETE CBC W/AUTO DIFF WBC: CPT | Mod: HCNC

## 2025-04-05 PROCEDURE — 84100 ASSAY OF PHOSPHORUS: CPT | Mod: HCNC

## 2025-04-05 PROCEDURE — 25000242 PHARM REV CODE 250 ALT 637 W/ HCPCS: Mod: HCNC | Performed by: STUDENT IN AN ORGANIZED HEALTH CARE EDUCATION/TRAINING PROGRAM

## 2025-04-05 PROCEDURE — 63600175 PHARM REV CODE 636 W HCPCS: Mod: HCNC | Performed by: STUDENT IN AN ORGANIZED HEALTH CARE EDUCATION/TRAINING PROGRAM

## 2025-04-05 PROCEDURE — 36415 COLL VENOUS BLD VENIPUNCTURE: CPT | Mod: HCNC

## 2025-04-05 PROCEDURE — 80053 COMPREHEN METABOLIC PANEL: CPT | Mod: HCNC

## 2025-04-05 PROCEDURE — 63600175 PHARM REV CODE 636 W HCPCS: Mod: HCNC

## 2025-04-05 PROCEDURE — 94640 AIRWAY INHALATION TREATMENT: CPT | Mod: HCNC

## 2025-04-05 PROCEDURE — 99233 SBSQ HOSP IP/OBS HIGH 50: CPT | Mod: HCNC,,, | Performed by: INTERNAL MEDICINE

## 2025-04-05 PROCEDURE — 83735 ASSAY OF MAGNESIUM: CPT | Mod: HCNC

## 2025-04-05 PROCEDURE — 94640 AIRWAY INHALATION TREATMENT: CPT | Mod: HCNC,ER

## 2025-04-05 PROCEDURE — 94760 N-INVAS EAR/PLS OXIMETRY 1: CPT | Mod: HCNC

## 2025-04-05 PROCEDURE — 21400001 HC TELEMETRY ROOM: Mod: HCNC

## 2025-04-05 PROCEDURE — 27000221 HC OXYGEN, UP TO 24 HOURS: Mod: HCNC

## 2025-04-05 PROCEDURE — 25000003 PHARM REV CODE 250: Mod: HCNC | Performed by: STUDENT IN AN ORGANIZED HEALTH CARE EDUCATION/TRAINING PROGRAM

## 2025-04-05 RX ORDER — OXYCODONE HYDROCHLORIDE 5 MG/1
5 TABLET ORAL EVERY 6 HOURS PRN
Refills: 0 | Status: DISCONTINUED | OUTPATIENT
Start: 2025-04-05 | End: 2025-04-08 | Stop reason: HOSPADM

## 2025-04-05 RX ADMIN — SENNOSIDES AND DOCUSATE SODIUM 1 TABLET: 50; 8.6 TABLET ORAL at 08:04

## 2025-04-05 RX ADMIN — ARFORMOTEROL TARTRATE 15 MCG: 15 SOLUTION RESPIRATORY (INHALATION) at 07:04

## 2025-04-05 RX ADMIN — PANTOPRAZOLE SODIUM 40 MG: 40 TABLET, DELAYED RELEASE ORAL at 08:04

## 2025-04-05 RX ADMIN — SUCRALFATE 1 G: 1 TABLET ORAL at 05:04

## 2025-04-05 RX ADMIN — SIMETHICONE 80 MG: 80 TABLET, CHEWABLE ORAL at 04:04

## 2025-04-05 RX ADMIN — BUDESONIDE 1 MG: 0.5 INHALANT RESPIRATORY (INHALATION) at 06:04

## 2025-04-05 RX ADMIN — INSULIN ASPART 4 UNITS: 100 INJECTION, SOLUTION INTRAVENOUS; SUBCUTANEOUS at 03:04

## 2025-04-05 RX ADMIN — OXYCODONE 5 MG: 5 TABLET ORAL at 03:04

## 2025-04-05 RX ADMIN — ACETAMINOPHEN 650 MG: 325 TABLET ORAL at 01:04

## 2025-04-05 RX ADMIN — SUCRALFATE 1 G: 1 TABLET ORAL at 04:04

## 2025-04-05 RX ADMIN — CEFTRIAXONE SODIUM 2 G: 2 INJECTION, POWDER, FOR SOLUTION INTRAMUSCULAR; INTRAVENOUS at 05:04

## 2025-04-05 RX ADMIN — SUCRALFATE 1 G: 1 TABLET ORAL at 10:04

## 2025-04-05 RX ADMIN — ASPIRIN 81 MG CHEWABLE TABLET 81 MG: 81 TABLET CHEWABLE at 08:04

## 2025-04-05 RX ADMIN — METOPROLOL SUCCINATE 50 MG: 50 TABLET, EXTENDED RELEASE ORAL at 08:04

## 2025-04-05 RX ADMIN — FAMOTIDINE 20 MG: 20 TABLET, FILM COATED ORAL at 09:04

## 2025-04-05 RX ADMIN — SUCRALFATE 1 G: 1 TABLET ORAL at 09:04

## 2025-04-05 RX ADMIN — FUROSEMIDE 40 MG: 10 INJECTION, SOLUTION INTRAVENOUS at 08:04

## 2025-04-05 RX ADMIN — MYCOPHENOLATE MOFETIL 1000 MG: 250 CAPSULE ORAL at 09:04

## 2025-04-05 RX ADMIN — ALUMINUM HYDROXIDE, MAGNESIUM HYDROXIDE, AND DIMETHICONE 30 ML: 200; 20; 200 SUSPENSION ORAL at 04:04

## 2025-04-05 RX ADMIN — BUDESONIDE 1 MG: 0.5 INHALANT RESPIRATORY (INHALATION) at 07:04

## 2025-04-05 RX ADMIN — SACUBITRIL AND VALSARTAN 1 TABLET: 24; 26 TABLET, FILM COATED ORAL at 09:04

## 2025-04-05 RX ADMIN — ARFORMOTEROL TARTRATE 15 MCG: 15 SOLUTION RESPIRATORY (INHALATION) at 06:04

## 2025-04-05 RX ADMIN — INSULIN ASPART 1 UNITS: 100 INJECTION, SOLUTION INTRAVENOUS; SUBCUTANEOUS at 09:04

## 2025-04-05 RX ADMIN — AMLODIPINE BESYLATE 10 MG: 5 TABLET ORAL at 08:04

## 2025-04-05 RX ADMIN — Medication 6 MG: at 09:04

## 2025-04-05 RX ADMIN — MYCOPHENOLATE MOFETIL 1000 MG: 250 CAPSULE ORAL at 08:04

## 2025-04-05 RX ADMIN — OXYBUTYNIN CHLORIDE 5 MG: 5 TABLET, EXTENDED RELEASE ORAL at 08:04

## 2025-04-05 RX ADMIN — ENOXAPARIN SODIUM 40 MG: 40 INJECTION SUBCUTANEOUS at 04:04

## 2025-04-05 NOTE — NURSING
Pt weak and refused to lie in bed and ambulating in room. Educated on fall safety risk again by multiple staff members. Teaching to be reinforced.

## 2025-04-05 NOTE — SUBJECTIVE & OBJECTIVE
Interval History: Patient is seen sitting outside of his room, he has no new complaint at this time but still on 4 liters of O2 via nasal canula, he denied chest pain, shortness for breath, orthopnea, paroxysmal nocturnal dyspnea or pedal swelling.    Review of Systems   Constitutional:  Negative for appetite change, chills, diaphoresis and fatigue.   HENT:  Negative for congestion, sore throat and tinnitus.    Eyes:  Negative for pain, discharge and itching.   Respiratory:  Negative for cough, chest tightness, shortness of breath and wheezing.    Gastrointestinal:  Negative for abdominal distention, abdominal pain, blood in stool, nausea and vomiting.   Endocrine: Negative for cold intolerance, heat intolerance and polydipsia.   Genitourinary:  Negative for difficulty urinating, dysuria, flank pain, frequency and hematuria.   Musculoskeletal:  Negative for arthralgias and back pain.   Skin:  Negative for color change, pallor, rash and wound.   Neurological:  Negative for dizziness, seizures, facial asymmetry, light-headedness, numbness and headaches.   Psychiatric/Behavioral:  Negative for agitation, confusion and hallucinations.      Objective:     Vital Signs (Most Recent):  Temp: 96.5 °F (35.8 °C) (04/05/25 0748)  Pulse: 67 (04/05/25 1114)  Resp: 18 (04/05/25 1114)  BP: 121/78 (04/05/25 1114)  SpO2: 100 % (04/05/25 1114) Vital Signs (24h Range):  Temp:  [96.5 °F (35.8 °C)-97.9 °F (36.6 °C)] 96.5 °F (35.8 °C)  Pulse:  [] 67  Resp:  [18-20] 18  SpO2:  [96 %-100 %] 100 %  BP: (102-137)/(73-89) 121/78     Weight: 55.9 kg (123 lb 3.8 oz)  Body mass index is 18.74 kg/m².    Intake/Output Summary (Last 24 hours) at 4/5/2025 1433  Last data filed at 4/5/2025 1344  Gross per 24 hour   Intake 480 ml   Output 850 ml   Net -370 ml         Physical Exam  Constitutional:       General: He is not in acute distress.     Appearance: Normal appearance. He is normal weight. He is not ill-appearing.   HENT:      Head:  Normocephalic and atraumatic.      Nose: Nose normal. No congestion or rhinorrhea.   Eyes:      Extraocular Movements: Extraocular movements intact.      Conjunctiva/sclera: Conjunctivae normal.      Pupils: Pupils are equal, round, and reactive to light.   Cardiovascular:      Rate and Rhythm: Normal rate and regular rhythm.      Pulses: Normal pulses.      Heart sounds: Normal heart sounds. No murmur heard.  Pulmonary:      Effort: Respiratory distress present.      Breath sounds: Wheezing present. No rales.   Chest:      Chest wall: No tenderness.   Abdominal:      General: Abdomen is flat. Bowel sounds are normal. There is no distension.      Palpations: Abdomen is soft.      Tenderness: There is no abdominal tenderness. There is no right CVA tenderness, left CVA tenderness, guarding or rebound.   Musculoskeletal:         General: No swelling, tenderness or deformity.      Cervical back: Neck supple. No rigidity or tenderness.      Right lower leg: No edema.      Left lower leg: No edema.   Skin:     General: Skin is warm and dry.   Neurological:      General: No focal deficit present.      Mental Status: He is alert and oriented to person, place, and time. Mental status is at baseline.      Cranial Nerves: No cranial nerve deficit.      Sensory: No sensory deficit.   Psychiatric:         Mood and Affect: Mood normal.         Behavior: Behavior normal.         Thought Content: Thought content normal.         Judgment: Judgment normal.               Significant Labs: All pertinent labs within the past 24 hours have been reviewed.    CBC:   Recent Labs   Lab 04/04/25 0452 04/05/25  0448   WBC 8.13 11.81   HGB 13.0* 13.6*   HCT 39.7* 41.7    220     CMP:   Recent Labs   Lab 04/04/25 0452 04/05/25  0448   * 136   K 4.5 4.6    102   CO2 23 23   BUN 21 28*   CREATININE 0.7 0.8   CALCIUM 7.9* 8.3*   ALBUMIN 2.9* 3.0*   BILITOT 0.6 0.3   ALKPHOS 50 53   AST 15 23   ALT 19 25   ANIONGAP 9 11      Lactic Acid:   Recent Labs   Lab 04/03/25  1647 04/03/25  1941   LACTATE 2.5* 1.8     Magnesium:   Recent Labs   Lab 04/04/25  0452 04/05/25  0448   MG 1.6 1.8     TSH:   Recent Labs   Lab 04/03/25  1457   TSH 0.143*     Urine Studies:   Recent Labs   Lab 04/03/25  1647   COLORU Yellow   APPEARANCEUA Clear   PHUR 6.0   SPECGRAV 1.025   PROTEINUA Trace*   GLUCUA Negative   BILIRUBINUA Negative   OCCULTUA Negative   NITRITE Negative   UROBILINOGEN Negative   LEUKOCYTESUR Negative       Significant Imaging: I have reviewed all pertinent imaging results/findings within the past 24 hours.  Imaging Results              CT Head Without Contrast (Final result)  Result time 04/03/25 23:42:56      Final result by Brody Mejia MD (04/03/25 23:42:56)                   Impression:      No acute intracranial abnormalities identified.      Electronically signed by: Brody Mejia MD  Date:    04/03/2025  Time:    23:42               Narrative:    EXAMINATION:  CT HEAD WITHOUT CONTRAST    CLINICAL HISTORY:  head trauma;    TECHNIQUE:  Low dose axial images were obtained through the head.  Coronal and sagittal reformations were also performed. Contrast was not administered.    COMPARISON:  CT head from April 2024.    FINDINGS:  Some residual contrast is seen throughout the intracranial circulation from earlier contrast enhanced chest and abdominal CT.    There is chronic microvascular ischemic disease.  No evidence of acute/recent major vascular distribution cerebral infarction, intraparenchymal hemorrhage, or intra-axial space occupying lesion. The ventricular system is normal in size and configuration with no evidence of hydrocephalus. No effacement of the skull-base cisterns. No abnormal extra-axial fluid collections or blood products. Visualized paranasal sinuses and mastoid air cells are essentially clear.  The calvarium shows no significant abnormality.                                       CTA Chest Non-Coronary (PE  Studies) (Final result)  Result time 04/03/25 22:00:17      Final result by Brody Mejia MD (04/03/25 22:00:17)                   Impression:      1. No evidence of PE.  2. Chronic interstitial lung disease/pulmonary fibrosis.      Electronically signed by: Brody Mejia MD  Date:    04/03/2025  Time:    22:00               Narrative:    EXAMINATION:  CTA CHEST NON CORONARY (PE STUDIES)    CLINICAL HISTORY:  R/O PE;    TECHNIQUE:  Low dose axial images, sagittal and coronal reformations were obtained from the thoracic inlet to the lung bases following the IV administration of 60 mL of Omnipaque 350.  Contrast timing was optimized to evaluate the pulmonary arteries.  MIP images were performed.    COMPARISON:  CT abdomen and pelvis from the same date.  Recent CTA chest 03/30/2025.    FINDINGS:  Structures at the base of the neck are unremarkable.  Aorta is non-aneurysmal.  The heart is normal in size without pericardial effusion.  Mild coronary artery calcification is seen.  No intraluminal filling defects within the pulmonary arteries to suggest pulmonary thromboembolism.  The esophagus is unremarkable along its course.    Major airways are patent.  Diffuse chronic interstitial lung disease/pulmonary fibrosis is seen.  No evidence of new focal consolidation.  No pleural effusion.    Osseous structures demonstrate degenerative change.  Extrathoracic soft tissues are unremarkable.    See earlier CT abdomen and pelvis report from the same date for full intra-abdominal details.                                       CT Abdomen Pelvis With IV Contrast NO Oral Contrast (Final result)  Result time 04/03/25 17:42:30      Final result by Brody Mejia MD (04/03/25 17:42:30)                   Impression:      1. Mild wall thickening with relative increased mucosal enhancement involving the gastric antral and pyloric region which can be seen in the setting of acute gastritis.  2. No additional acute intra-abdominal  abnormalities identified.  3. Chronic interstitial lung disease/pulmonary fibrosis.  4. Multiple additional findings as detailed above.      Electronically signed by: Brody Mejia MD  Date:    04/03/2025  Time:    17:42               Narrative:    EXAMINATION:  CT ABDOMEN PELVIS WITH IV CONTRAST    CLINICAL HISTORY:  Epigastric pain;    TECHNIQUE:  Low dose axial images, sagittal and coronal reformations were obtained from the lung bases to the pubic symphysis following the IV administration of 75 mL of Omnipaque 350 .  Oral contrast was not given.    COMPARISON:  CT abdomen and pelvis from April 2024.    FINDINGS:  Heart is normal in size.  Coronary artery calcification is seen.  Diffuse chronic interstitial lung disease/pulmonary fibrosis is seen in the visualized lungs.  No focal consolidation or pleural effusion.    No significant hepatic abnormalities are identified.  There is no intra-or extrahepatic biliary ductal dilatation.  The gallbladder is unremarkable.  The stomach is distended with fluid and ingested material.  Mild wall thickening with relative increased mucosal enhancement is seen involving the gastric antral and pyloric region.  Pancreas, spleen, and adrenal glands are unremarkable.    Kidneys enhance normally with no evidence of hydronephrosis.  Single nonobstructing right renal stone is seen.  Numerous bilateral renal cysts are seen, the largest of which measures 6.5 cm on the left.  No abnormalities are appreciated along the ureteral courses.  Urinary bladder is nondistended.  Prostate appears within normal limits in size.    Appendix is visualized and is unremarkable.  The visualized loops of small and large bowel show no evidence of obstruction or inflammation.  Moderate volume retained stool is seen throughout the colon.  No free air or free fluid.    Aorta tapers normally.    No acute osseous abnormality identified. Subcutaneous soft tissues show no significant abnormalities.                                        X-Ray Chest AP Portable (Final result)  Result time 04/03/25 17:10:34      Final result by Brody Mejia MD (04/03/25 17:10:34)                   Impression:      Chronic interstitial lung disease/pulmonary fibrosis.  No superimposed acute cardiopulmonary process identified.  No significant change.      Electronically signed by: Brody Mejia MD  Date:    04/03/2025  Time:    17:10               Narrative:    EXAMINATION:  XR CHEST AP PORTABLE    CLINICAL HISTORY:  Sepsis;    TECHNIQUE:  Single frontal view of the chest was performed.    COMPARISON:  03/30/2025.    FINDINGS:  Cardiac silhouette is normal in size.  Lungs are hypoinflated.  There is diffuse chronic interstitial lung disease/pulmonary fibrosis.  No evidence of new focal consolidative process, pneumothorax, or significant pleural effusion.  No acute osseous abnormality identified.

## 2025-04-05 NOTE — PROGRESS NOTES
[facility] Medicine  Progress Note    Patient Name: Emmanuel Grant  MRN: [id number]  Patient Class: IP- Inpatient   Admission Date: 4/3/2025  Length of Stay: 2 days  Attending Physician: Sammy Navas MD  Primary Care Provider: Wilfredo De Souza MD        Subjective     Principal Problem:IPF (idiopathic pulmonary fibrosis)        HPI:  This is a 73-year-old male with a past medical history of ILD/pulmonary fibrosis (on 3L O2), CAD, HFrEF (EF: 40%), hypertension, type 2 diabetes, hyperlipidemia, CVA, BPH who presents with shortness of breath.     Patient presented to endocrinology clinic for routine follow-up, and was noted to be dyspneic.  He was referred to the ED for further evaluation. On my examination, patient was altered after receiving ativan, but reportedly he had worsening dyspnea, dry cough and abdominal pain. Of note, the patient had 3 hospitalizations over the last month: 1) 3/16-3/17, for ACS r/o, 2) 3/23-3/25 for acute on chronic hypoxia in the setting of ILD/HF, 3) 3/29-4/1 for ILD flare.     In the ED, the patient was tachycardic (120s-140s), tachypneic (20s-40s), was placed on 3 L O2.  Labs were remarkable for leukocytosis (15.2), elevated D-dimer (1.55), elevated BNP (115), elevated lactic acid (3.6  > 1.8), elevated procalcitonin (0.33), low TSH (0.143).  UDS was positive for opioids.  UA was unremarkable.  Chest x-ray showed chronic interstitial appearance, with no acute process.  CT abdomen and pelvis showed mild wall thickening/increased mucosal enhancement involving the gastric antral and pyloric regions (possible acute gastritis).  Patient was given 500 mL of NS, vancomycin, cefepime, Benadryl 25 mg IV, Ativan 1 mg IV, morphine 4 mg IV, Zofran 4 mg IV, Carafate 1 g p.o..  He was admitted for further management.    Overview/Hospital Course:  Patient is 74 y.o. male has a past medical history of Acute hypoxic respiratory failure (09/23/2024), BPH (benign prostatic  hyperplasia) (02/28/2024), CAD (coronary artery disease), Chronic HFrEF (heart failure with reduced ejection fraction) (05/01/2024), Diabetes mellitus, Hypertension, Kidney stone, Stroke, and Type 2 diabetes mellitus without complication, without long-term current use of insulin (10/08/2021). presented to [facility] on 4/3/25 with worsening sob and tachycardia and was admitted for acute exacerbation of interstitial pulmonary fibrosis.  Supplemental oxygen requirement improving with steroids.     Interval History: Patient is seen sitting outside of his room, he has no new complaint at this time but still on 4 liters of O2 via nasal canula, he denied chest pain, shortness for breath, orthopnea, paroxysmal nocturnal dyspnea or pedal swelling.    Review of Systems   Constitutional:  Negative for appetite change, chills, diaphoresis and fatigue.   HENT:  Negative for congestion, sore throat and tinnitus.    Eyes:  Negative for pain, discharge and itching.   Respiratory:  Negative for cough, chest tightness, shortness of breath and wheezing.    Gastrointestinal:  Negative for abdominal distention, abdominal pain, blood in stool, nausea and vomiting.   Endocrine: Negative for cold intolerance, heat intolerance and polydipsia.   Genitourinary:  Negative for difficulty urinating, dysuria, flank pain, frequency and hematuria.   Musculoskeletal:  Negative for arthralgias and back pain.   Skin:  Negative for color change, pallor, rash and wound.   Neurological:  Negative for dizziness, seizures, facial asymmetry, light-headedness, numbness and headaches.   Psychiatric/Behavioral:  Negative for agitation, confusion and hallucinations.      Objective:     Vital Signs (Most Recent):  Temp: 96.5 °F (35.8 °C) (04/05/25 0748)  Pulse: 67 (04/05/25 1114)  Resp: 18 (04/05/25 1114)  BP: 121/78 (04/05/25 1114)  SpO2: 100 % (04/05/25 1114) Vital Signs (24h Range):  Temp:  [96.5 °F (35.8 °C)-97.9 °F (36.6 °C)] 96.5 °F (35.8  °C)  Pulse:  [] 67  Resp:  [18-20] 18  SpO2:  [96 %-100 %] 100 %  BP: (102-137)/(73-89) 121/78     Weight: 55.9 kg (123 lb 3.8 oz)  Body mass index is 18.74 kg/m².    Intake/Output Summary (Last 24 hours) at 4/5/2025 1433  Last data filed at 4/5/2025 1344  Gross per 24 hour   Intake 480 ml   Output 850 ml   Net -370 ml         Physical Exam  Constitutional:       General: He is not in acute distress.     Appearance: Normal appearance. He is normal weight. He is not ill-appearing.   HENT:      Head: Normocephalic and atraumatic.      Nose: Nose normal. No congestion or rhinorrhea.   Eyes:      Extraocular Movements: Extraocular movements intact.      Conjunctiva/sclera: Conjunctivae normal.      Pupils: Pupils are equal, round, and reactive to light.   Cardiovascular:      Rate and Rhythm: Normal rate and regular rhythm.      Pulses: Normal pulses.      Heart sounds: Normal heart sounds. No murmur heard.  Pulmonary:      Effort: Respiratory distress present.      Breath sounds: Wheezing present. No rales.   Chest:      Chest wall: No tenderness.   Abdominal:      General: Abdomen is flat. Bowel sounds are normal. There is no distension.      Palpations: Abdomen is soft.      Tenderness: There is no abdominal tenderness. There is no right CVA tenderness, left CVA tenderness, guarding or rebound.   Musculoskeletal:         General: No swelling, tenderness or deformity.      Cervical back: Neck supple. No rigidity or tenderness.      Right lower leg: No edema.      Left lower leg: No edema.   Skin:     General: Skin is warm and dry.   Neurological:      General: No focal deficit present.      Mental Status: He is alert and oriented to person, place, and time. Mental status is at baseline.      Cranial Nerves: No cranial nerve deficit.      Sensory: No sensory deficit.   Psychiatric:         Mood and Affect: Mood normal.         Behavior: Behavior normal.         Thought Content: Thought content normal.          Judgment: Judgment normal.               Significant Labs: All pertinent labs within the past 24 hours have been reviewed.    CBC:   Recent Labs   Lab 04/04/25  0452 04/05/25  0448   WBC 8.13 11.81   HGB 13.0* 13.6*   HCT 39.7* 41.7    220     CMP:   Recent Labs   Lab 04/04/25  0452 04/05/25  0448   * 136   K 4.5 4.6    102   CO2 23 23   BUN 21 28*   CREATININE 0.7 0.8   CALCIUM 7.9* 8.3*   ALBUMIN 2.9* 3.0*   BILITOT 0.6 0.3   ALKPHOS 50 53   AST 15 23   ALT 19 25   ANIONGAP 9 11     Lactic Acid:   Recent Labs   Lab 04/03/25  1647 04/03/25  1941   LACTATE 2.5* 1.8     Magnesium:   Recent Labs   Lab 04/04/25  0452 04/05/25  0448   MG 1.6 1.8     TSH:   Recent Labs   Lab 04/03/25  1457   TSH 0.143*     Urine Studies:   Recent Labs   Lab 04/03/25  1647   COLORU Yellow   APPEARANCEUA Clear   PHUR 6.0   SPECGRAV 1.025   PROTEINUA Trace*   GLUCUA Negative   BILIRUBINUA Negative   OCCULTUA Negative   NITRITE Negative   UROBILINOGEN Negative   LEUKOCYTESUR Negative       Significant Imaging: I have reviewed all pertinent imaging results/findings within the past 24 hours.  Imaging Results              CT Head Without Contrast (Final result)  Result time 04/03/25 23:42:56      Final result by Brody Mejia MD (04/03/25 23:42:56)                   Impression:      No acute intracranial abnormalities identified.      Electronically signed by: Brody Mejia MD  Date:    04/03/2025  Time:    23:42               Narrative:    EXAMINATION:  CT HEAD WITHOUT CONTRAST    CLINICAL HISTORY:  head trauma;    TECHNIQUE:  Low dose axial images were obtained through the head.  Coronal and sagittal reformations were also performed. Contrast was not administered.    COMPARISON:  CT head from April 2024.    FINDINGS:  Some residual contrast is seen throughout the intracranial circulation from earlier contrast enhanced chest and abdominal CT.    There is chronic microvascular ischemic disease.  No evidence of  acute/recent major vascular distribution cerebral infarction, intraparenchymal hemorrhage, or intra-axial space occupying lesion. The ventricular system is normal in size and configuration with no evidence of hydrocephalus. No effacement of the skull-base cisterns. No abnormal extra-axial fluid collections or blood products. Visualized paranasal sinuses and mastoid air cells are essentially clear.  The calvarium shows no significant abnormality.                                       CTA Chest Non-Coronary (PE Studies) (Final result)  Result time 04/03/25 22:00:17      Final result by Brody Mejia MD (04/03/25 22:00:17)                   Impression:      1. No evidence of PE.  2. Chronic interstitial lung disease/pulmonary fibrosis.      Electronically signed by: Brody Mejia MD  Date:    04/03/2025  Time:    22:00               Narrative:    EXAMINATION:  CTA CHEST NON CORONARY (PE STUDIES)    CLINICAL HISTORY:  R/O PE;    TECHNIQUE:  Low dose axial images, sagittal and coronal reformations were obtained from the thoracic inlet to the lung bases following the IV administration of 60 mL of Omnipaque 350.  Contrast timing was optimized to evaluate the pulmonary arteries.  MIP images were performed.    COMPARISON:  CT abdomen and pelvis from the same date.  Recent CTA chest 03/30/2025.    FINDINGS:  Structures at the base of the neck are unremarkable.  Aorta is non-aneurysmal.  The heart is normal in size without pericardial effusion.  Mild coronary artery calcification is seen.  No intraluminal filling defects within the pulmonary arteries to suggest pulmonary thromboembolism.  The esophagus is unremarkable along its course.    Major airways are patent.  Diffuse chronic interstitial lung disease/pulmonary fibrosis is seen.  No evidence of new focal consolidation.  No pleural effusion.    Osseous structures demonstrate degenerative change.  Extrathoracic soft tissues are unremarkable.    See earlier CT abdomen  and pelvis report from the same date for full intra-abdominal details.                                       CT Abdomen Pelvis With IV Contrast NO Oral Contrast (Final result)  Result time 04/03/25 17:42:30      Final result by Brody Mejia MD (04/03/25 17:42:30)                   Impression:      1. Mild wall thickening with relative increased mucosal enhancement involving the gastric antral and pyloric region which can be seen in the setting of acute gastritis.  2. No additional acute intra-abdominal abnormalities identified.  3. Chronic interstitial lung disease/pulmonary fibrosis.  4. Multiple additional findings as detailed above.      Electronically signed by: Brody Mejia MD  Date:    04/03/2025  Time:    17:42               Narrative:    EXAMINATION:  CT ABDOMEN PELVIS WITH IV CONTRAST    CLINICAL HISTORY:  Epigastric pain;    TECHNIQUE:  Low dose axial images, sagittal and coronal reformations were obtained from the lung bases to the pubic symphysis following the IV administration of 75 mL of Omnipaque 350 .  Oral contrast was not given.    COMPARISON:  CT abdomen and pelvis from April 2024.    FINDINGS:  Heart is normal in size.  Coronary artery calcification is seen.  Diffuse chronic interstitial lung disease/pulmonary fibrosis is seen in the visualized lungs.  No focal consolidation or pleural effusion.    No significant hepatic abnormalities are identified.  There is no intra-or extrahepatic biliary ductal dilatation.  The gallbladder is unremarkable.  The stomach is distended with fluid and ingested material.  Mild wall thickening with relative increased mucosal enhancement is seen involving the gastric antral and pyloric region.  Pancreas, spleen, and adrenal glands are unremarkable.    Kidneys enhance normally with no evidence of hydronephrosis.  Single nonobstructing right renal stone is seen.  Numerous bilateral renal cysts are seen, the largest of which measures 6.5 cm on the left.  No  abnormalities are appreciated along the ureteral courses.  Urinary bladder is nondistended.  Prostate appears within normal limits in size.    Appendix is visualized and is unremarkable.  The visualized loops of small and large bowel show no evidence of obstruction or inflammation.  Moderate volume retained stool is seen throughout the colon.  No free air or free fluid.    Aorta tapers normally.    No acute osseous abnormality identified. Subcutaneous soft tissues show no significant abnormalities.                                       X-Ray Chest AP Portable (Final result)  Result time 04/03/25 17:10:34      Final result by Brody Mejia MD (04/03/25 17:10:34)                   Impression:      Chronic interstitial lung disease/pulmonary fibrosis.  No superimposed acute cardiopulmonary process identified.  No significant change.      Electronically signed by: Brody Mejia MD  Date:    04/03/2025  Time:    17:10               Narrative:    EXAMINATION:  XR CHEST AP PORTABLE    CLINICAL HISTORY:  Sepsis;    TECHNIQUE:  Single frontal view of the chest was performed.    COMPARISON:  03/30/2025.    FINDINGS:  Cardiac silhouette is normal in size.  Lungs are hypoinflated.  There is diffuse chronic interstitial lung disease/pulmonary fibrosis.  No evidence of new focal consolidative process, pneumothorax, or significant pleural effusion.  No acute osseous abnormality identified.                                          Assessment & Plan  IPF (idiopathic pulmonary fibrosis)  Hx of IPF referred from endocrinologist office  CT chest on presentation showed features consistent with IPF  Continue CellCept  Continue prednisone taper  Continue empiric ceftriaxone given elevated procalcitonin  Consult pulmonary and palliative care;input appreciated  Restart home ofeiv upon discharge  Type 2 diabetes mellitus without complication, without long-term current use of insulin  Lab Results   Component Value Date    HGBA1C 7.8 (H)  "01/02/2025     Basal, bolus and correctional insulin  Glycemic protocol. LDSS    Coronary artery disease involving native coronary artery of native heart with angina pectoris  No acute issues.  No chest pain, shortness for breath orthopnea.  Essential hypertension  Patient's blood pressure range in the last 24 hours was: BP  Min: 102/67  Max: 137/89.The patient's inpatient anti-hypertensive regimen is listed below:  Current Antihypertensives  amLODIPine tablet 10 mg, Daily, Oral  metoprolol succinate (TOPROL-XL) 24 hr tablet 50 mg, Daily, Oral  furosemide injection 40 mg, Daily, Intravenous    Plan  - BP is controlled, no changes needed to their regimen  H/O: CVA (cerebrovascular accident)  History noted.    Continue statin and risk reduction.  Continue home medications.    Chronic hypoxemic respiratory failure  Patient with Hypoxic Respiratory failure which is Chronic.  he is on home oxygen at 3 LPM.   Supplemental oxygen was provided and noted-    In the setting of ILD, he's has been followed up by pulmonology.  .   Signs/symptoms of respiratory failure include- increased work of breathing. Contributing diagnoses includes -  ILD - possibly PE  Labs and images were reviewed. Patient Has recent ABG, which has been reviewed. Will treat underlying causes and adjust management of respiratory failure as follows-     Solumedrol 125 mg IV x1. Continue steroid taper   Obtain CTA chest to rule out PE, given persistent tachycardia   Sinus tachycardia  Sinus tachycardia, worse with agitation, improved with rest  CTA chest ordered showed "No evidence of PE, chronic interstitial lung disease/pulmonary fibrosis".  We will follow up TSH.    Gastritis  Stable, no abdominal pain  Continue Protonix, Carafate    Chronic systolic congestive heart failure  Patient has Combined Systolic and Diastolic heart failure that is Chronic. Most recent BNP and echo results are listed below.  Recent Labs     04/03/25  1457   *     Latest " ECHO  Results for orders placed during the hospital encounter of 04/03/25    Echo    Interpretation Summary    Left Ventricle: The left ventricle is normal in size. Normal wall thickness. Mild global hypokinesis present. There is mildly reduced systolic function with a visually estimated ejection fraction of 45 - 50%.    Right Ventricle: The right ventricle is normal in size. Systolic function is normal.    Aorta: Aortic root is mildly dilated measuring 3.82 cm.    Similar findings noted on prior report dated 3/17/25.    Current Heart Failure Medications  metoprolol succinate (TOPROL-XL) 24 hr tablet 50 mg, Daily, Oral  sacubitriL-valsartan 24-26 mg per tablet 1 tablet, 2 times daily, Oral  furosemide injection 40 mg, Daily, Intravenous    Plan  - Monitor strict I&Os and daily weights.    - Place on telemetry  - Low sodium diet  - Place on fluid restriction of 1.5 L.   - Cardiology has not been consulted  - The patient's volume status is at their baseline        Advance care planning  Patient has family in [place], he does not want to go to [place] because he has kids want to put him in the nursing home  He is currently living in a trailer.   working on placement.    Hyperlipidemia  Continue home dose of statin.    VTE Risk Mitigation (From admission, onward)           Ordered     enoxaparin injection 40 mg  Daily         04/03/25 2055     IP VTE HIGH RISK PATIENT  Once         04/03/25 2055     Place sequential compression device  Until discontinued         04/03/25 2055                    Discharge Planning   DERIC:      Code Status: Full Code   Medical Readiness for Discharge Date:   Discharge Plan A: Home Health                        Sammy Navas MD  Department of Hospital Medicine   [facility]

## 2025-04-05 NOTE — PROGRESS NOTES
[facility]  Pulmonology  Progress Note    Patient Name: Emmanuel Grant  MRN: [id number]  Admission Date: 4/3/2025  Hospital Length of Stay: 2 days  Code Status: Full Code  Attending Provider: Sammy Navas MD  Primary Care Provider: Wilfredo De Souza MD   Principal Problem: IPF (idiopathic pulmonary fibrosis)    Subjective:     Interval History: no acute issue.  Ambulating with nasal canula.  Somewhat improved dyspnea      Objective:     Vital Signs (Most Recent):  Temp: 96.5 °F (35.8 °C) (04/05/25 0748)  Pulse: 67 (04/05/25 1114)  Resp: 18 (04/05/25 1114)  BP: 121/78 (04/05/25 1114)  SpO2: 100 % (04/05/25 1114) Vital Signs (24h Range):  Temp:  [96.5 °F (35.8 °C)-97.9 °F (36.6 °C)] 96.5 °F (35.8 °C)  Pulse:  [] 67  Resp:  [18-20] 18  SpO2:  [96 %-100 %] 100 %  BP: (102-137)/(73-89) 121/78     Weight: 55.9 kg (123 lb 3.8 oz)  Body mass index is 18.74 kg/m².      Intake/Output Summary (Last 24 hours) at 4/5/2025 1125  Last data filed at 4/5/2025 0926  Gross per 24 hour   Intake 360 ml   Output 850 ml   Net -490 ml        Physical Exam  Vitals and nursing note reviewed.   Constitutional:       General: He is not in acute distress.     Appearance: He is not ill-appearing.   HENT:      Mouth/Throat:      Mouth: Mucous membranes are moist.   Cardiovascular:      Rate and Rhythm: Tachycardia present.   Pulmonary:      Effort: Pulmonary effort is normal.      Breath sounds: No rhonchi.      Comments: Coarse breath sound bilaterally  Abdominal:      General: Abdomen is flat.   Skin:     General: Skin is warm.   Neurological:      General: No focal deficit present.      Mental Status: He is alert.      Comments: Alert and interactive.  Frequent tangent.             Review of Systems    Vents:       Lines/Drains/Airways       Peripheral Intravenous Line  Duration                  Peripheral IV - Single Lumen 04/03/25 1910 20 G Right Antecubital 1 day                    Significant Labs:    CBC/Anemia  "Profile:  Recent Labs   Lab 04/03/25  1457 04/04/25  0452 04/05/25  0448   WBC 15.29* 8.13 11.81   HGB 16.1 13.0* 13.6*   HCT 47.9 39.7* 41.7    212 220   MCV 84 84 84   RDW 15.0* 14.9* 15.1*        Chemistries:  Recent Labs   Lab 04/03/25  1457 04/04/25  0452 04/05/25  0448    135* 136   K 5.0 4.5 4.6    103 102   CO2 18* 23 23   BUN 27* 21 28*   CREATININE 1.0 0.7 0.8   CALCIUM 9.4 7.9* 8.3*   ALBUMIN 3.9 2.9* 3.0*   BILITOT 0.8 0.6 0.3   ALKPHOS 69 50 53   ALT 29 19 25   AST 33 15 23   GLUCOSE 110 205* 180*   MG 1.8 1.6 1.8   PHOS 2.9 2.7 3.0       ABGs:   Recent Labs   Lab 04/03/25 2047   PH 7.376   PCO2 43.9   HCO3 25.7   POCSATURATED 61   BE 0     Lactic Acid:   Recent Labs   Lab 04/03/25  1457 04/03/25  1647 04/03/25  1941   LACTATE 3.6* 2.5* 1.8     Respiratory Culture: No results for input(s): "GSRESP", "RESPIRATORYC" in the last 48 hours.    Significant Imaging:  I have reviewed all pertinent imaging results/findings within the past 24 hours.    ABG  Recent Labs   Lab 04/03/25 2047   PH 7.376   PO2 33*   PCO2 43.9   HCO3 25.7   BE 0     Assessment/Plan:     Pulmonary  * IPF (idiopathic pulmonary fibrosis)  Ct with basilar honeycombing and lack of ggo c/w uip.  Given patient's age, findings c/w ipf  Patient was not able to perform pft  Seen by Dr. Schmitt as an outpatient.    Already on ofev and also with cellcept.  Result of CT d/w patient.  He is aware of progression AEB more prominent honeycombing on 4/3/25 CT as compared to CT in 2023  Will continue with steroid.  Would recommend 10-14 prednisone  Resume ofev at the time of discharge    Cardiac/Vascular  Chronic systolic congestive heart failure  Patient has Systolic (HFrEF) heart failure that is Chronic. On presentation their CHF was decompensated. Evidence of decompensated CHF on presentation includes: shortness of breath. Most recent BNP and echo results are listed below.  Recent Labs     04/03/25  1457   *       Latest " ECHO  Results for orders placed during the hospital encounter of 04/03/25    Echo    Interpretation Summary    Left Ventricle: The left ventricle is normal in size. Normal wall thickness. Mild global hypokinesis present. There is mildly reduced systolic function with a visually estimated ejection fraction of 45 - 50%.    Right Ventricle: The right ventricle is normal in size. Systolic function is normal.    Aorta: Aortic root is mildly dilated measuring 3.82 cm.    Similar findings noted on prior report dated 3/17/25.    Current Heart Failure Medications  metoprolol succinate (TOPROL-XL) 24 hr tablet 50 mg, Daily, Oral  sacubitriL-valsartan 24-26 mg per tablet 1 tablet, 2 times daily, Oral  furosemide injection 40 mg, Daily, Intravenous    Plan  - Monitor strict I&Os and daily weights.    - Place on telemetry  - Low sodium diet  - lasix to 40 mg daily    Palliative Care  Advance care planning  4/4/25Given patient progressing dyspnea and fibrosis on CT, During this visit, I engaged the patient in the advance care planning process along with ANABELA Glaser.  The patient and I reviewed the role for advance directives and their purpose in directing future healthcare if the patient's unable to speak for him/herself.  At this point in time, the patient borderline decision-making capacity.  We discussed different extreme health states that patient could experience, and reviewed what kind of medical care patient would want in those situations.  The patient communicated that if he was comatose and had little chance of a meaningful recovery, he would is not sure if he would want machines/life-sustaining treatments used.  In addition to the above preference, patient  HAS NOT completed a living will to reflect these preferences.  The patient HAS designated his son, Nikos Grant, as healthcare power of  to make decisions on her behalf.  In the case of cardiopulmonary arrest, patient is not sure if would want CPR,  intubation or cardioversion.  At time, patient seems overwhelm and confused.  Will continue with further conversation in the company of family.      4/5/25 patient is listless today.  I am not confident patient patient is fully grasping his current situation.  Appreciate palliative inputs.  Will need on going goc discussion with family when available.            Pino Urena MD  Pulmonology  [facility]

## 2025-04-05 NOTE — PLAN OF CARE
Problem: Skin Injury Risk Increased  Goal: Skin Health and Integrity  Outcome: Progressing     Problem: Adult Inpatient Plan of Care  Goal: Plan of Care Review  Outcome: Progressing  Goal: Patient-Specific Goal (Individualized)  Outcome: Progressing     Problem: Infection  Goal: Absence of Infection Signs and Symptoms  Outcome: Progressing

## 2025-04-05 NOTE — ASSESSMENT & PLAN NOTE
Patient has Systolic (HFrEF) heart failure that is Chronic. On presentation their CHF was decompensated. Evidence of decompensated CHF on presentation includes: shortness of breath. Most recent BNP and echo results are listed below.  Recent Labs     04/03/25  1457   *       Latest ECHO  Results for orders placed during the hospital encounter of 04/03/25    Echo    Interpretation Summary    Left Ventricle: The left ventricle is normal in size. Normal wall thickness. Mild global hypokinesis present. There is mildly reduced systolic function with a visually estimated ejection fraction of 45 - 50%.    Right Ventricle: The right ventricle is normal in size. Systolic function is normal.    Aorta: Aortic root is mildly dilated measuring 3.82 cm.    Similar findings noted on prior report dated 3/17/25.    Current Heart Failure Medications  metoprolol succinate (TOPROL-XL) 24 hr tablet 50 mg, Daily, Oral  sacubitriL-valsartan 24-26 mg per tablet 1 tablet, 2 times daily, Oral  furosemide injection 40 mg, Daily, Intravenous    Plan  - Monitor strict I&Os and daily weights.    - Place on telemetry  - Low sodium diet  - lasix to 40 mg daily

## 2025-04-05 NOTE — ASSESSMENT & PLAN NOTE
"Sinus tachycardia, worse with agitation, improved with rest  CTA chest ordered showed "No evidence of PE, chronic interstitial lung disease/pulmonary fibrosis".  We will follow up TSH.    "

## 2025-04-05 NOTE — SUBJECTIVE & OBJECTIVE
Interval History: no acute issue.  Ambulating with nasal canula.  Somewhat improved dyspnea      Objective:     Vital Signs (Most Recent):  Temp: 96.5 °F (35.8 °C) (04/05/25 0748)  Pulse: 67 (04/05/25 1114)  Resp: 18 (04/05/25 1114)  BP: 121/78 (04/05/25 1114)  SpO2: 100 % (04/05/25 1114) Vital Signs (24h Range):  Temp:  [96.5 °F (35.8 °C)-97.9 °F (36.6 °C)] 96.5 °F (35.8 °C)  Pulse:  [] 67  Resp:  [18-20] 18  SpO2:  [96 %-100 %] 100 %  BP: (102-137)/(73-89) 121/78     Weight: 55.9 kg (123 lb 3.8 oz)  Body mass index is 18.74 kg/m².      Intake/Output Summary (Last 24 hours) at 4/5/2025 1125  Last data filed at 4/5/2025 0926  Gross per 24 hour   Intake 360 ml   Output 850 ml   Net -490 ml        Physical Exam  Vitals and nursing note reviewed.   Constitutional:       General: He is not in acute distress.     Appearance: He is not ill-appearing.   HENT:      Mouth/Throat:      Mouth: Mucous membranes are moist.   Cardiovascular:      Rate and Rhythm: Tachycardia present.   Pulmonary:      Effort: Pulmonary effort is normal.      Breath sounds: No rhonchi.      Comments: Coarse breath sound bilaterally  Abdominal:      General: Abdomen is flat.   Skin:     General: Skin is warm.   Neurological:      General: No focal deficit present.      Mental Status: He is alert.      Comments: Alert and interactive.  Frequent tangent.             Review of Systems    Vents:       Lines/Drains/Airways       Peripheral Intravenous Line  Duration                  Peripheral IV - Single Lumen 04/03/25 1910 20 G Right Antecubital 1 day                    Significant Labs:    CBC/Anemia Profile:  Recent Labs   Lab 04/03/25  1457 04/04/25  0452 04/05/25  0448   WBC 15.29* 8.13 11.81   HGB 16.1 13.0* 13.6*   HCT 47.9 39.7* 41.7    212 220   MCV 84 84 84   RDW 15.0* 14.9* 15.1*        Chemistries:  Recent Labs   Lab 04/03/25  1457 04/04/25  0452 04/05/25  0448    135* 136   K 5.0 4.5 4.6    103 102   CO2 18* 23 23  "  BUN 27* 21 28*   CREATININE 1.0 0.7 0.8   CALCIUM 9.4 7.9* 8.3*   ALBUMIN 3.9 2.9* 3.0*   BILITOT 0.8 0.6 0.3   ALKPHOS 69 50 53   ALT 29 19 25   AST 33 15 23   GLUCOSE 110 205* 180*   MG 1.8 1.6 1.8   PHOS 2.9 2.7 3.0       ABGs:   Recent Labs   Lab 04/03/25  2047   PH 7.376   PCO2 43.9   HCO3 25.7   POCSATURATED 61   BE 0     Lactic Acid:   Recent Labs   Lab 04/03/25  1457 04/03/25  1647 04/03/25  1941   LACTATE 3.6* 2.5* 1.8     Respiratory Culture: No results for input(s): "GSRESP", "RESPIRATORYC" in the last 48 hours.    Significant Imaging:  I have reviewed all pertinent imaging results/findings within the past 24 hours.  "

## 2025-04-05 NOTE — ASSESSMENT & PLAN NOTE
4/4/25Given patient progressing dyspnea and fibrosis on CT, During this visit, I engaged the patient in the advance care planning process along with ANABELA Glaser.  The patient and I reviewed the role for advance directives and their purpose in directing future healthcare if the patient's unable to speak for him/herself.  At this point in time, the patient borderline decision-making capacity.  We discussed different extreme health states that patient could experience, and reviewed what kind of medical care patient would want in those situations.  The patient communicated that if he was comatose and had little chance of a meaningful recovery, he would is not sure if he would want machines/life-sustaining treatments used.  In addition to the above preference, patient  HAS NOT completed a living will to reflect these preferences.  The patient HAS designated his son, Nikos Grant, as healthcare power of  to make decisions on her behalf.  In the case of cardiopulmonary arrest, patient is not sure if would want CPR, intubation or cardioversion.  At time, patient seems overwhelm and confused.  Will continue with further conversation in the company of family.      4/5/25 patient is listless today.  I am not confident patient patient is fully grasping his current situation.  Appreciate palliative inputs.  Will need on going goc discussion with family when available.

## 2025-04-05 NOTE — PLAN OF CARE
Problem: Coping Ineffective  Goal: Effective Coping  Outcome: Progressing  Intervention: Support and Enhance Coping Strategies  Flowsheets (Taken 4/5/2025 1849)  Supportive Measures:   active listening utilized   counseling provided   positive reinforcement provided   relaxation techniques promoted   self-care encouraged   verbalization of feelings encouraged  Environmental Support:   calm environment promoted   distractions minimized   environmental consistency promoted   rest periods encouraged     Problem: Fall Injury Risk  Goal: Absence of Fall and Fall-Related Injury  Outcome: Progressing  Intervention: Identify and Manage Contributors  Flowsheets (Taken 4/5/2025 1849)  Self-Care Promotion: BADL personal objects within reach  Medication Review/Management:   medications reviewed   high-risk medications identified

## 2025-04-05 NOTE — ASSESSMENT & PLAN NOTE
Ct with basilar honeycombing and lack of ggo c/w uip.  Given patient's age, findings c/w ipf  Patient was not able to perform pft  Seen by Dr. Schmitt as an outpatient.    Already on ofev and also with cellcept.  Result of CT d/w patient.  He is aware of progression AEB more prominent honeycombing on 4/3/25 CT as compared to CT in 2023  Will continue with steroid.  Would recommend 10-14 prednisone  Resume ofev at the time of discharge

## 2025-04-05 NOTE — NURSING
Fall risk safety explained to pt multiple times by staff. Pt ambulated in room and hallway. No sleep last night and grunting. Pain medications and GI medications administered. Teaching to be reinforced.

## 2025-04-05 NOTE — ASSESSMENT & PLAN NOTE
Patient with Hypoxic Respiratory failure which is Chronic.  he is on home oxygen at 3 LPM.   Supplemental oxygen was provided and noted-    In the setting of ILD, he's has been followed up by pulmonology.  .   Signs/symptoms of respiratory failure include- increased work of breathing. Contributing diagnoses includes - ILD - possibly PE Labs and images were reviewed. Patient Has recent ABG, which has been reviewed. Will treat underlying causes and adjust management of respiratory failure as follows-     Solumedrol 125 mg IV x1. Continue steroid taper   Obtain CTA chest to rule out PE, given persistent tachycardia

## 2025-04-05 NOTE — ASSESSMENT & PLAN NOTE
Patient has family in [place], he does not want to go to [place] because he has kids want to put him in the nursing home  He is currently living in a trailer.   working on placement.

## 2025-04-05 NOTE — NURSING
Are we scheduling with MFM?  Please advise on what to schedule.   [facility] [place]  Nurses Note -- 4 Eyes        Date:  04/05/2025        Skin assessed on:  Q shift        [x] No Pressure Injuries Present                 []Prevention Measures Documented     [] Yes LDA Previously documented      [] Yes New Pressure Injury Discovered              [] LDA Added        Attending RN: KANDI Hogan     Second RN:  GARY Low

## 2025-04-05 NOTE — NURSING
[facility], [facility]  Nurses Note -- 4 Eyes      4/4/2025       Skin assessed on: Q Shift      [x] No Pressure Injuries Present    []Prevention Measures Documented    [] Yes LDA  for Pressure Injury Previously documented     [] Yes New Pressure Injury Discovered   [] LDA for New Pressure Injury Added      Attending RN:  Devante Garnica LPN     Second RN:  KANDI Bliss

## 2025-04-05 NOTE — ASSESSMENT & PLAN NOTE
Patient's blood pressure range in the last 24 hours was: BP  Min: 102/67  Max: 137/89.The patient's inpatient anti-hypertensive regimen is listed below:  Current Antihypertensives  amLODIPine tablet 10 mg, Daily, Oral  metoprolol succinate (TOPROL-XL) 24 hr tablet 50 mg, Daily, Oral  furosemide injection 40 mg, Daily, Intravenous    Plan  - BP is controlled, no changes needed to their regimen

## 2025-04-05 NOTE — NURSING
Patient up walking in room, oxygen on per NC, complaints of feeling weak, staff educated the importance for safety of resting in bed at this time, patient verbalized understanding, yet continued to pace in room. Staff x's 2 redirected patient for safety unsuccessfully.

## 2025-04-05 NOTE — ASSESSMENT & PLAN NOTE
Lab Results   Component Value Date    HGBA1C 7.8 (H) 01/02/2025     Basal, bolus and correctional insulin  [facility]

## 2025-04-06 LAB
POCT GLUCOSE: 207 MG/DL (ref 70–110)
POCT GLUCOSE: 221 MG/DL (ref 70–110)
POCT GLUCOSE: 233 MG/DL (ref 70–110)
POCT GLUCOSE: 293 MG/DL (ref 70–110)

## 2025-04-06 PROCEDURE — 63600175 PHARM REV CODE 636 W HCPCS: Mod: HCNC | Performed by: STUDENT IN AN ORGANIZED HEALTH CARE EDUCATION/TRAINING PROGRAM

## 2025-04-06 PROCEDURE — 99900035 HC TECH TIME PER 15 MIN (STAT): Mod: HCNC

## 2025-04-06 PROCEDURE — 63600175 PHARM REV CODE 636 W HCPCS: Mod: HCNC | Performed by: INTERNAL MEDICINE

## 2025-04-06 PROCEDURE — 27000221 HC OXYGEN, UP TO 24 HOURS: Mod: HCNC

## 2025-04-06 PROCEDURE — 94761 N-INVAS EAR/PLS OXIMETRY MLT: CPT | Mod: HCNC

## 2025-04-06 PROCEDURE — 63600175 PHARM REV CODE 636 W HCPCS: Mod: HCNC

## 2025-04-06 PROCEDURE — 99232 SBSQ HOSP IP/OBS MODERATE 35: CPT | Mod: HCNC,,, | Performed by: INTERNAL MEDICINE

## 2025-04-06 PROCEDURE — 25000003 PHARM REV CODE 250: Mod: HCNC | Performed by: STUDENT IN AN ORGANIZED HEALTH CARE EDUCATION/TRAINING PROGRAM

## 2025-04-06 PROCEDURE — 94640 AIRWAY INHALATION TREATMENT: CPT | Mod: HCNC

## 2025-04-06 PROCEDURE — 21400001 HC TELEMETRY ROOM: Mod: HCNC

## 2025-04-06 PROCEDURE — 25000242 PHARM REV CODE 250 ALT 637 W/ HCPCS: Mod: HCNC | Performed by: STUDENT IN AN ORGANIZED HEALTH CARE EDUCATION/TRAINING PROGRAM

## 2025-04-06 PROCEDURE — 99900031 HC PATIENT EDUCATION (STAT): Mod: HCNC

## 2025-04-06 RX ORDER — SYRING-NEEDL,DISP,INSUL,0.3 ML 29 G X1/2"
296 SYRINGE, EMPTY DISPOSABLE MISCELLANEOUS
Status: COMPLETED | OUTPATIENT
Start: 2025-04-06 | End: 2025-04-06

## 2025-04-06 RX ORDER — BISACODYL 10 MG/1
10 SUPPOSITORY RECTAL DAILY PRN
Status: DISCONTINUED | OUTPATIENT
Start: 2025-04-06 | End: 2025-04-06 | Stop reason: SDUPTHER

## 2025-04-06 RX ORDER — AMOXICILLIN 250 MG
1 CAPSULE ORAL DAILY
Status: DISCONTINUED | OUTPATIENT
Start: 2025-04-06 | End: 2025-04-08 | Stop reason: HOSPADM

## 2025-04-06 RX ORDER — INSULIN ASPART 100 [IU]/ML
2 INJECTION, SOLUTION INTRAVENOUS; SUBCUTANEOUS
Status: DISCONTINUED | OUTPATIENT
Start: 2025-04-06 | End: 2025-04-08 | Stop reason: HOSPADM

## 2025-04-06 RX ORDER — SODIUM CHLORIDE 0.9 G/100ML
500 IRRIGANT IRRIGATION
Status: COMPLETED | OUTPATIENT
Start: 2025-04-06 | End: 2025-04-06

## 2025-04-06 RX ORDER — POLYETHYLENE GLYCOL 3350 17 G/17G
17 POWDER, FOR SOLUTION ORAL DAILY
Status: DISCONTINUED | OUTPATIENT
Start: 2025-04-06 | End: 2025-04-08 | Stop reason: HOSPADM

## 2025-04-06 RX ORDER — INSULIN GLARGINE 100 [IU]/ML
10 INJECTION, SOLUTION SUBCUTANEOUS DAILY
Status: DISCONTINUED | OUTPATIENT
Start: 2025-04-06 | End: 2025-04-08 | Stop reason: HOSPADM

## 2025-04-06 RX ORDER — PSEUDOEPHEDRINE/ACETAMINOPHEN 30MG-500MG
100 TABLET ORAL
Status: COMPLETED | OUTPATIENT
Start: 2025-04-06 | End: 2025-04-06

## 2025-04-06 RX ADMIN — CEFTRIAXONE SODIUM 2 G: 2 INJECTION, POWDER, FOR SOLUTION INTRAMUSCULAR; INTRAVENOUS at 04:04

## 2025-04-06 RX ADMIN — BUDESONIDE 1 MG: 0.5 INHALANT RESPIRATORY (INHALATION) at 07:04

## 2025-04-06 RX ADMIN — ASPIRIN 81 MG CHEWABLE TABLET 81 MG: 81 TABLET CHEWABLE at 08:04

## 2025-04-06 RX ADMIN — METOPROLOL SUCCINATE 50 MG: 50 TABLET, EXTENDED RELEASE ORAL at 08:04

## 2025-04-06 RX ADMIN — INSULIN ASPART 2 UNITS: 100 INJECTION, SOLUTION INTRAVENOUS; SUBCUTANEOUS at 10:04

## 2025-04-06 RX ADMIN — MYCOPHENOLATE MOFETIL 1000 MG: 250 CAPSULE ORAL at 08:04

## 2025-04-06 RX ADMIN — PANTOPRAZOLE SODIUM 40 MG: 40 TABLET, DELAYED RELEASE ORAL at 08:04

## 2025-04-06 RX ADMIN — ARFORMOTEROL TARTRATE 15 MCG: 15 SOLUTION RESPIRATORY (INHALATION) at 07:04

## 2025-04-06 RX ADMIN — SUCRALFATE 1 G: 1 TABLET ORAL at 10:04

## 2025-04-06 RX ADMIN — SUCRALFATE 1 G: 1 TABLET ORAL at 04:04

## 2025-04-06 RX ADMIN — SUCRALFATE 1 G: 1 TABLET ORAL at 08:04

## 2025-04-06 RX ADMIN — OXYBUTYNIN CHLORIDE 5 MG: 5 TABLET, EXTENDED RELEASE ORAL at 08:04

## 2025-04-06 RX ADMIN — SODIUM CHLORIDE 500 ML: 900 IRRIGANT IRRIGATION at 11:04

## 2025-04-06 RX ADMIN — POLYETHYLENE GLYCOL 3350 17 G: 17 POWDER, FOR SOLUTION ORAL at 12:04

## 2025-04-06 RX ADMIN — SUCRALFATE 1 G: 1 TABLET ORAL at 05:04

## 2025-04-06 RX ADMIN — SENNOSIDES AND DOCUSATE SODIUM 1 TABLET: 50; 8.6 TABLET ORAL at 08:04

## 2025-04-06 RX ADMIN — BUDESONIDE 1 MG: 0.5 INHALANT RESPIRATORY (INHALATION) at 08:04

## 2025-04-06 RX ADMIN — LATANOPROST 1 DROP: 50 SOLUTION OPHTHALMIC at 08:04

## 2025-04-06 RX ADMIN — ARFORMOTEROL TARTRATE 15 MCG: 15 SOLUTION RESPIRATORY (INHALATION) at 08:04

## 2025-04-06 RX ADMIN — INSULIN ASPART 3 UNITS: 100 INJECTION, SOLUTION INTRAVENOUS; SUBCUTANEOUS at 12:04

## 2025-04-06 RX ADMIN — SACUBITRIL AND VALSARTAN 1 TABLET: 24; 26 TABLET, FILM COATED ORAL at 08:04

## 2025-04-06 RX ADMIN — INSULIN ASPART 2 UNITS: 100 INJECTION, SOLUTION INTRAVENOUS; SUBCUTANEOUS at 04:04

## 2025-04-06 RX ADMIN — AMLODIPINE BESYLATE 10 MG: 5 TABLET ORAL at 08:04

## 2025-04-06 RX ADMIN — FUROSEMIDE 40 MG: 10 INJECTION, SOLUTION INTRAVENOUS at 08:04

## 2025-04-06 RX ADMIN — Medication 100 ML: at 11:04

## 2025-04-06 RX ADMIN — INSULIN ASPART 2 UNITS: 100 INJECTION, SOLUTION INTRAVENOUS; SUBCUTANEOUS at 12:04

## 2025-04-06 RX ADMIN — GUAIFENESIN AND DEXTROMETHORPHAN 10 ML: 100; 10 SYRUP ORAL at 08:04

## 2025-04-06 RX ADMIN — SENNOSIDES AND DOCUSATE SODIUM 1 TABLET: 50; 8.6 TABLET ORAL at 11:04

## 2025-04-06 RX ADMIN — Medication 6 MG: at 08:04

## 2025-04-06 RX ADMIN — INSULIN GLARGINE 10 UNITS: 100 INJECTION, SOLUTION SUBCUTANEOUS at 10:04

## 2025-04-06 RX ADMIN — ENOXAPARIN SODIUM 40 MG: 40 INJECTION SUBCUTANEOUS at 04:04

## 2025-04-06 RX ADMIN — MAGNESIUM CITRATE 296 ML: 1.75 LIQUID ORAL at 11:04

## 2025-04-06 NOTE — PROGRESS NOTES
[facility] Medicine  Progress Note    Patient Name: Emmanuel Grant  MRN: [id number]  Patient Class: IP- Inpatient   Admission Date: 4/3/2025  Length of Stay: 3 days  Attending Physician: Kristian Han MD  Primary Care Provider: Wilfredo De Souza MD        Subjective     Principal Problem:Interstitial lung disease        HPI:  This is a 73-year-old male with a past medical history of ILD/pulmonary fibrosis (on 3L O2), CAD, HFrEF (EF: 40%), hypertension, type 2 diabetes, hyperlipidemia, CVA, BPH who presents with shortness of breath.     Patient presented to endocrinology clinic for routine follow-up, and was noted to be dyspneic.  He was referred to the ED for further evaluation. On my examination, patient was altered after receiving ativan, but reportedly he had worsening dyspnea, dry cough and abdominal pain. Of note, the patient had 3 hospitalizations over the last month: 1) 3/16-3/17, for ACS r/o, 2) 3/23-3/25 for acute on chronic hypoxia in the setting of ILD/HF, 3) 3/29-4/1 for ILD flare.     In the ED, the patient was tachycardic (120s-140s), tachypneic (20s-40s), was placed on 3 L O2.  Labs were remarkable for leukocytosis (15.2), elevated D-dimer (1.55), elevated BNP (115), elevated lactic acid (3.6  > 1.8), elevated procalcitonin (0.33), low TSH (0.143).  UDS was positive for opioids.  UA was unremarkable.  Chest x-ray showed chronic interstitial appearance, with no acute process.  CT abdomen and pelvis showed mild wall thickening/increased mucosal enhancement involving the gastric antral and pyloric regions (possible acute gastritis).  Patient was given 500 mL of NS, vancomycin, cefepime, Benadryl 25 mg IV, Ativan 1 mg IV, morphine 4 mg IV, Zofran 4 mg IV, Carafate 1 g p.o..  He was admitted for further management.    Overview/Hospital Course:  Patient is 74 y.o. male has a past medical history of Acute hypoxic respiratory failure (09/23/2024), BPH (benign prostatic hyperplasia)  (02/28/2024), CAD (coronary artery disease), Chronic HFrEF (heart failure with reduced ejection fraction) (05/01/2024), Diabetes mellitus, Hypertension, Kidney stone, Stroke, and Type 2 diabetes mellitus without complication, without long-term current use of insulin (10/08/2021). presented to [facility] on 4/3/25 with worsening sob and tachycardia and was admitted for acute exacerbation of interstitial pulmonary fibrosis.  Supplemental oxygen requirement improving with steroids.     Still quite fatigued. Palliative care and ongoing [facility] conversations. Glucoses not well controlled on steroids, will add basal/bolus insulin.     Interval History:  NAEON.  No new issues.   CC- SOBOE  All questions answered and updates on care given.       ROS:  General: Negative for fevers   Cardiac: Negative for chest pain   Pulmonary: Negative for wheezing  GI: Negative for abdominal distention      Vitals:    04/06/25 0418 04/06/25 0738 04/06/25 0745 04/06/25 0752   BP: 112/70 103/73     BP Location: Left arm Right arm     Patient Position: Lying Sitting     Pulse: 67 72 70 69   Resp: 18 (!) 21 (!) 24    Temp: 97.5 °F (36.4 °C) 97.2 °F (36.2 °C)     TempSrc: Oral      SpO2: 99% 99% 99%    Weight:       Height:              Body mass index is 18.74 kg/m².      PHYSICAL EXAM:  GENERAL APPEARANCE: alert and cooperative.     HEAD: NC/AT  CARDIAC: There is no cyanosis or pallor.   LUNGS: No apparent wheezing or stridor.  ABDOMEN: Non-distended. No guarding.  MSK: No joint erythema or tenderness.   EXTREMITIES: No significant new deformity or new joint abnormality.   NEUROLOGICAL: CN II-XII grossly intact.   SKIN: No lesions or eruptions.  PSYCHIATRIC: No tangential speech. No Hyperactive features.        Recent Results (from the past 24 hours)   POCT glucose    Collection Time: 04/05/25 11:13 AM   Result Value Ref Range    POCT Glucose 388 (H) 70 - 110 mg/dL   POCT glucose    Collection Time: 04/05/25  3:40 PM   Result Value Ref  Range    POCT Glucose 336 (H) 70 - 110 mg/dL   POCT glucose    Collection Time: 04/05/25  7:37 PM   Result Value Ref Range    POCT Glucose 233 (H) 70 - 110 mg/dL   POCT glucose    Collection Time: 04/06/25  7:37 AM   Result Value Ref Range    POCT Glucose 207 (H) 70 - 110 mg/dL       Microbiology Results (last 7 days)       Procedure Component Value Units Date/Time    Blood culture x two cultures. Draw prior to antibiotics. [[id number]]  (Normal) Collected: 04/03/25 1457    Order Status: Completed Specimen: Blood from Peripheral, Forearm, Left Updated: 04/05/25 1601     Blood Culture No Growth After 48 Hours    Blood culture x two cultures. Draw prior to antibiotics. [[id number]]  (Normal) Collected: 04/03/25 1457    Order Status: Completed Specimen: Blood from Peripheral, Forearm, Right Updated: 04/05/25 1601     Blood Culture No Growth After 48 Hours    Respiratory Infection Panel (PCR), Nasopharyngeal [[id number]] Collected: 04/04/25 0854    Order Status: Completed Specimen: Nasopharyngeal Swab Updated: 04/04/25 1818     Respiratory Infection Panel Source Nasopharyngeal Swab     Adenovirus Not Detected     Coronavirus 229E, Common Cold Virus Not Detected     Coronavirus HKU1, Common Cold Virus Not Detected     Coronavirus NL63, Common Cold Virus Not Detected     Coronavirus OC43, Common Cold Virus Not Detected     SARS-CoV2 (COVID-19) Qualitative PCR Not Detected     Human Metapneumovirus Not Detected     Human Rhinovirus/Enterovirus Not Detected     Influenza A Not Detected     Influenza B Not Detected     Parainfluenza Virus 1 Not Detected     Parainfluenza Virus 2 Not Detected     Parainfluenza Virus 3 Not Detected     Parainfluenza Virus 4 Not Detected     Respiratory Syncytial Virus Not Detected     Bordetella Parapertussis (BM2228) Not Detected     Bordetella pertussis (ptxP) Not Detected     Chlamydia pneumoniae Not Detected     Mycoplasma pneumoniae Not Detected    Influenza A & B by Molecular  [[id number]]     Order Status: Canceled Specimen: Nasopharyngeal Swab              Imaging Results              CT Head Without Contrast (Final result)  Result time 04/03/25 23:42:56      Final result by Brody Mejia MD (04/03/25 23:42:56)                   Impression:      No acute intracranial abnormalities identified.      Electronically signed by: Brody Mejia MD  Date:    04/03/2025  Time:    23:42               Narrative:    EXAMINATION:  CT HEAD WITHOUT CONTRAST    CLINICAL HISTORY:  head trauma;    TECHNIQUE:  Low dose axial images were obtained through the head.  Coronal and sagittal reformations were also performed. Contrast was not administered.    COMPARISON:  CT head from April 2024.    FINDINGS:  Some residual contrast is seen throughout the intracranial circulation from earlier contrast enhanced chest and abdominal CT.    There is chronic microvascular ischemic disease.  No evidence of acute/recent major vascular distribution cerebral infarction, intraparenchymal hemorrhage, or intra-axial space occupying lesion. The ventricular system is normal in size and configuration with no evidence of hydrocephalus. No effacement of the skull-base cisterns. No abnormal extra-axial fluid collections or blood products. Visualized paranasal sinuses and mastoid air cells are essentially clear.  The calvarium shows no significant abnormality.                                       CTA Chest Non-Coronary (PE Studies) (Final result)  Result time 04/03/25 22:00:17      Final result by Brody Mejia MD (04/03/25 22:00:17)                   Impression:      1. No evidence of PE.  2. Chronic interstitial lung disease/pulmonary fibrosis.      Electronically signed by: Brody Mejia MD  Date:    04/03/2025  Time:    22:00               Narrative:    EXAMINATION:  CTA CHEST NON CORONARY (PE STUDIES)    CLINICAL HISTORY:  R/O PE;    TECHNIQUE:  Low dose axial images, sagittal and coronal reformations were obtained  from the thoracic inlet to the lung bases following the IV administration of 60 mL of Omnipaque 350.  Contrast timing was optimized to evaluate the pulmonary arteries.  MIP images were performed.    COMPARISON:  CT abdomen and pelvis from the same date.  Recent CTA chest 03/30/2025.    FINDINGS:  Structures at the base of the neck are unremarkable.  Aorta is non-aneurysmal.  The heart is normal in size without pericardial effusion.  Mild coronary artery calcification is seen.  No intraluminal filling defects within the pulmonary arteries to suggest pulmonary thromboembolism.  The esophagus is unremarkable along its course.    Major airways are patent.  Diffuse chronic interstitial lung disease/pulmonary fibrosis is seen.  No evidence of new focal consolidation.  No pleural effusion.    Osseous structures demonstrate degenerative change.  Extrathoracic soft tissues are unremarkable.    See earlier CT abdomen and pelvis report from the same date for full intra-abdominal details.                                       CT Abdomen Pelvis With IV Contrast NO Oral Contrast (Final result)  Result time 04/03/25 17:42:30      Final result by Brody Mejia MD (04/03/25 17:42:30)                   Impression:      1. Mild wall thickening with relative increased mucosal enhancement involving the gastric antral and pyloric region which can be seen in the setting of acute gastritis.  2. No additional acute intra-abdominal abnormalities identified.  3. Chronic interstitial lung disease/pulmonary fibrosis.  4. Multiple additional findings as detailed above.      Electronically signed by: Brody Mejia MD  Date:    04/03/2025  Time:    17:42               Narrative:    EXAMINATION:  CT ABDOMEN PELVIS WITH IV CONTRAST    CLINICAL HISTORY:  Epigastric pain;    TECHNIQUE:  Low dose axial images, sagittal and coronal reformations were obtained from the lung bases to the pubic symphysis following the IV administration of 75 mL of  Omnipaque 350 .  Oral contrast was not given.    COMPARISON:  CT abdomen and pelvis from April 2024.    FINDINGS:  Heart is normal in size.  Coronary artery calcification is seen.  Diffuse chronic interstitial lung disease/pulmonary fibrosis is seen in the visualized lungs.  No focal consolidation or pleural effusion.    No significant hepatic abnormalities are identified.  There is no intra-or extrahepatic biliary ductal dilatation.  The gallbladder is unremarkable.  The stomach is distended with fluid and ingested material.  Mild wall thickening with relative increased mucosal enhancement is seen involving the gastric antral and pyloric region.  Pancreas, spleen, and adrenal glands are unremarkable.    Kidneys enhance normally with no evidence of hydronephrosis.  Single nonobstructing right renal stone is seen.  Numerous bilateral renal cysts are seen, the largest of which measures 6.5 cm on the left.  No abnormalities are appreciated along the ureteral courses.  Urinary bladder is nondistended.  Prostate appears within normal limits in size.    Appendix is visualized and is unremarkable.  The visualized loops of small and large bowel show no evidence of obstruction or inflammation.  Moderate volume retained stool is seen throughout the colon.  No free air or free fluid.    Aorta tapers normally.    No acute osseous abnormality identified. Subcutaneous soft tissues show no significant abnormalities.                                       X-Ray Chest AP Portable (Final result)  Result time 04/03/25 17:10:34      Final result by Brody Mejia MD (04/03/25 17:10:34)                   Impression:      Chronic interstitial lung disease/pulmonary fibrosis.  No superimposed acute cardiopulmonary process identified.  No significant change.      Electronically signed by: Brody Mejia MD  Date:    04/03/2025  Time:    17:10               Narrative:    EXAMINATION:  XR CHEST AP PORTABLE    CLINICAL  HISTORY:  Sepsis;    TECHNIQUE:  Single frontal view of the chest was performed.    COMPARISON:  03/30/2025.    FINDINGS:  Cardiac silhouette is normal in size.  Lungs are hypoinflated.  There is diffuse chronic interstitial lung disease/pulmonary fibrosis.  No evidence of new focal consolidative process, pneumothorax, or significant pleural effusion.  No acute osseous abnormality identified.                                               Assessment & Plan  IPF (idiopathic pulmonary fibrosis)  Hx of IPF referred from endocrinologist office  CT chest on presentation showed features consistent with IPF  Continue CellCept  Continue prednisone taper  Continue empiric ceftriaxone given elevated procalcitonin  Consult pulmonary and palliative care;input appreciated  Restart home ofeiv upon discharge  Type 2 diabetes mellitus without complication, without long-term current use of insulin  Lab Results   Component Value Date    HGBA1C 7.8 (H) 01/02/2025     Basal, bolus and correctional insulin  Glycemic protocol. LDSS    Coronary artery disease involving native coronary artery of native heart with angina pectoris  No acute issues.  No chest pain, shortness for breath orthopnea.  Essential hypertension  Patient's blood pressure range in the last 24 hours was: BP  Min: 102/67  Max: 121/78.The patient's inpatient anti-hypertensive regimen is listed below:  Current Antihypertensives  amLODIPine tablet 10 mg, Daily, Oral  metoprolol succinate (TOPROL-XL) 24 hr tablet 50 mg, Daily, Oral  furosemide injection 40 mg, Daily, Intravenous    Plan  - BP is controlled, no changes needed to their regimen  H/O: CVA (cerebrovascular accident)  History noted.    Continue statin and risk reduction.  Continue home medications.    Chronic hypoxemic respiratory failure  Patient with Hypoxic Respiratory failure which is Chronic.  he is on home oxygen at 3 LPM.   Supplemental oxygen was provided and noted-    In the setting of ILD, he's has been  "followed up by pulmonology.  .   Signs/symptoms of respiratory failure include- increased work of breathing. Contributing diagnoses includes -  ILD - possibly PE  Labs and images were reviewed. Patient Has recent ABG, which has been reviewed. Will treat underlying causes and adjust management of respiratory failure as follows-     Solumedrol 125 mg IV x1. Continue steroid taper   Obtain CTA chest to rule out PE, given persistent tachycardia   Sinus tachycardia  Sinus tachycardia, worse with agitation, improved with rest  CTA chest ordered showed "No evidence of PE, chronic interstitial lung disease/pulmonary fibrosis".  We will follow up TSH.    Gastritis  Stable, no abdominal pain  Continue Protonix, Carafate    Chronic systolic congestive heart failure  Patient has Combined Systolic and Diastolic heart failure that is Chronic. Most recent BNP and echo results are listed below.  Recent Labs     04/03/25  1457   *     Latest ECHO  Results for orders placed during the hospital encounter of 04/03/25    Echo    Interpretation Summary    Left Ventricle: The left ventricle is normal in size. Normal wall thickness. Mild global hypokinesis present. There is mildly reduced systolic function with a visually estimated ejection fraction of 45 - 50%.    Right Ventricle: The right ventricle is normal in size. Systolic function is normal.    Aorta: Aortic root is mildly dilated measuring 3.82 cm.    Similar findings noted on prior report dated 3/17/25.    Current Heart Failure Medications  metoprolol succinate (TOPROL-XL) 24 hr tablet 50 mg, Daily, Oral  sacubitriL-valsartan 24-26 mg per tablet 1 tablet, 2 times daily, Oral  furosemide injection 40 mg, Daily, Intravenous    Plan  - Monitor strict I&Os and daily weights.    - Place on telemetry  - Low sodium diet  - Place on fluid restriction of 1.5 L.   - Cardiology has not been consulted  - The patient's volume status is at their baseline        Advance care " planning  Patient has family in [place], he does not want to go to [place] because he has kids want to put him in the nursing home  He is currently living in a trailer.   working on placement.    Hyperlipidemia  Continue home dose of statin.    Interstitial lung disease exacerbation  See IPF/ILD note    VTE Risk Mitigation (From admission, onward)           Ordered     enoxaparin injection 40 mg  Daily         04/03/25 2055     IP VTE HIGH RISK PATIENT  Once         04/03/25 2055     Place sequential compression device  Until discontinued         04/03/25 2055                    Discharge Planning   DERIC: 4/9/2025     Code Status: Full Code   Medical Readiness for Discharge Date:   Discharge Plan A: Home Health                        Kristian Han MD  Department of Hospital Medicine   [facility]

## 2025-04-06 NOTE — NURSING
[facility], [facility]  Nurses Note -- 4 Eyes      4/5/2025       Skin assessed on: Q Shift      [x] No Pressure Injuries Present    []Prevention Measures Documented    [] Yes LDA  for Pressure Injury Previously documented     [] Yes New Pressure Injury Discovered   [] LDA for New Pressure Injury Added      Attending RN:  Aleksey Newman LPN     Second RN:  Samira Snowedn RN

## 2025-04-06 NOTE — ASSESSMENT & PLAN NOTE
Lab Results   Component Value Date    HGBA1C 7.8 (H) 01/02/2025     Basal, bolus and correctional insulin  [facility]

## 2025-04-06 NOTE — PLAN OF CARE
Problem: Adult Inpatient Plan of Care  Goal: Plan of Care Review  Outcome: Progressing     Problem: Skin Injury Risk Increased  Goal: Skin Health and Integrity  Outcome: Progressing     Problem: Infection  Goal: Absence of Infection Signs and Symptoms  Outcome: Progressing     Problem: Diabetes Comorbidity  Goal: Blood Glucose Level Within Targeted Range  Outcome: Progressing     Problem: Fall Injury Risk  Goal: Absence of Fall and Fall-Related Injury  Outcome: Progressing

## 2025-04-06 NOTE — PLAN OF CARE
Problem: Coping Ineffective  Goal: Effective Coping  Outcome: Progressing  Intervention: Support and Enhance Coping Strategies  Flowsheets (Taken 4/6/2025 1838)  Supportive Measures:   active listening utilized   counseling provided   relaxation techniques promoted   self-care encouraged   verbalization of feelings encouraged  Family/Support System Care:   self-care encouraged   support provided  Environmental Support:   calm environment promoted   distractions minimized   environmental consistency promoted   rest periods encouraged     Problem: Fall Injury Risk  Goal: Absence of Fall and Fall-Related Injury  Outcome: Progressing  Intervention: Identify and Manage Contributors  Flowsheets (Taken 4/6/2025 1838)  Self-Care Promotion:   independence encouraged   BADL personal objects within reach  Medication Review/Management:   medications reviewed   high-risk medications identified      [Routine Follow-Up] : a routine follow-up visit for [Asthma/RAD] : asthma/RAD [Father] : father

## 2025-04-06 NOTE — ASSESSMENT & PLAN NOTE
Hx of IPF referred from endocrinologist office  CT chest on presentation showed features consistent with IPF  Continue CellCept  Continue prednisone taper  Continue empiric ceftriaxone given elevated procalcitonin  Consult pulmonary and palliative care;input appreciated  Restart home ofeiv upon discharge   Patient was in today to see Dr. Wise and scheduled Colonoscopy/EGD for 10/17.  He has additional questions and hoping for a call back today if possible.

## 2025-04-06 NOTE — NURSING
[facility], [facility]  Nurses Note -- 4 Eyes        Date:  04/06/2025        Skin assessed on:  Q shift        [x] No Pressure Injuries Present                 []Prevention Measures Documented     [] Yes LDA Previously documented      [] Yes New Pressure Injury Discovered              [] LDA Added        Attending RN:  KANDI Hogan     Second RN:   GARY Ryder

## 2025-04-06 NOTE — ASSESSMENT & PLAN NOTE
Physical Therapy Daily Treatment     Visit Count: 5  Plan of Care Dates:  Initial: 6/21/2018 Through: 8/30/2018  Insurance Information:  ID: [id number]     Group #: N/A     DOI: 2/19/2018  Employer: [employer]  : NOR[place]  #: [phone]  Fax#: [phone]  CLAIM IS OPEN AND COMPENSABLE  Secondary Insurance:NONE    Next Referring Provider Visit: 7/31/18    Referred by: Aureliano Pyle PA-C  Medical Diagnosis (from order):  V43.65 (ICD-9-CM) - Z96.652 (ICD-10-CM) - Status post left unicompartmental knee replacement  Insurance: 1. -[employer]  2. N/A    Date of Surgery: 5/21/18; Surgery performed: ARTHROPLASTY KNEE UNICOMPARTMENTAL (Left Knee) medial and cemented ;   Physician Guidelines: yes  Diagnosis Precautions: none  Chart reviewed: Relevant co-morbidities, allergies, tests and medications:   Reviewed in EPIC    SUBJECTIVE   Patient reports that his L knee is feeling \"about the same.\" Patient reports stiffness still remains with increased self hamstring stretch.     Current Pain: stiff/10.    Functional Change: no recent changes    OBJECTIVE   Range of Motion (degrees)    Norm Left Right Left   Date   Initial Initial 7/10/18   Knee Flexion 135  118 130 116; 122 after treatment    Knee Extension 0-5 Lacking 10 Lacking 1  Lacking 2      standard testing positions unless otherwise noted; Key: ranges are reported in active range of motion unless noted as AA=active assistive or P=passive range of motion, * denotes pain   Comments: Only those motions that were assessed are noted.    Treatment   Therapeutic Exercise:   Elliptical level 2 x 6min  Side lying knee flexion stretch   Hip hinge with dowel 1 x 15  DL RDL x 15  Stool scoots  - SL hamstring pulls 2 x 30'  Standing hamstring stretch on stairs   Plyopress:  - SL 75# 2 x 15,  Sled push  105# 2 x 50'  SL balance on stair x 30 sec   - with abduction flutter kick x 30 sec   L lateral lunge x 15   PROM to available range + overpressure knee  Continue home dose of statin.     flexion    Current Home Program (not performed this date except as noted above):   Heel prop knee extension stretch  Long sitting Hamstring stretch  Standing runners stance gastroc stretch  Long sitting gastroc stretch  Side lying hip abduction  Box squats  Dell SCHROEDER RDL - 7/6/18    ASSESSMENT   Patient has progressed with AROM knee extension to lacking 1 degrees, a 1 degree difference compared to uninvolved knee extension. Patient has also improved AROM knee flexion to 122 degrees following treatment. He continues to fatigue quickly during hamstring strengthening exercises.  Patient benefited from education regarding hip hinge and how it can help improve dead lift exercise mechanics. Patient also educated on increasing knee flexion stretch intensity with goal of improving L knee extension to ~130 degrees, his R knee flexion.     Pain after treatment: not rated/10  Result of above outlined education: Verbalizes understanding and Demonstrates understanding    Goals:       To be obtained by end of this plan of care:  1. Patient independent with modified and progressed home exercise program.  2. Patient will decrease involved knee pain/symptoms to <3/10  to aid in ambulating on level and unlevel surfaces and stair ambulation.   3. Patient will increase involved knee active range of motion to 0-0-125° to aid in stair ambulation and squatting for lifting for household independent living.   4. Patient will increase involved knee strength to 5/5 to aid in squatting for lifting for household independent living and lifting for work activities.  5. Patient will be able to tolerate standing activities for greater than or equal to 90 minutes with minimal pain/difficulty  6. Patient/Client will be able to lift 100 pounds from floor to waist with proper body mechanics to assist with lifting activities at work.  7. Patient/Client will be able to carry 30 pounds 20 feet to allow them to lift and carry door jambs and lumbar at  work.     PLAN   Progress knee extension with joint mobilizations and stretching.  Gym based strengthening with goal of returning to high demand physical activity - sled push, RDL, box lifts, stool scoots     Stair lunge knee flexion stretch    THERAPY DAILY BILLING   Primary Insurance:  [employer]  Secondary Insurance: N/A    Evaluation Procedures:  No evaluation codes were used on this date of service    Timed Procedures:  Therapeutic Exercise, 45 minutes    Untimed Procedures:  No untimed codes were used on this date of service    Total Treatment Time: 45 minutes    Care approved by and performed under the direction of therapist.  Student's note read and approved.   Bertram Gilliland, PT

## 2025-04-06 NOTE — SUBJECTIVE & OBJECTIVE
Interval History: no acute issue.  Ambulating with nasal canula.  Appeared listless.  +BM      Objective:     Vital Signs (Most Recent):  Temp: 97.2 °F (36.2 °C) (04/06/25 0738)  Pulse: 64 (04/06/25 1109)  Resp: (!) 21 (04/06/25 1109)  BP: 112/71 (04/06/25 1109)  SpO2: 96 % (04/06/25 1109) Vital Signs (24h Range):  Temp:  [97 °F (36.1 °C)-97.5 °F (36.4 °C)] 97.2 °F (36.2 °C)  Pulse:  [62-98] 64  Resp:  [18-24] 21  SpO2:  [96 %-99 %] 96 %  BP: (102-118)/(67-77) 112/71     Weight: 55.9 kg (123 lb 3.8 oz)  Body mass index is 18.74 kg/m².      Intake/Output Summary (Last 24 hours) at 4/6/2025 1302  Last data filed at 4/6/2025 0808  Gross per 24 hour   Intake 410 ml   Output --   Net 410 ml        Physical Exam  Vitals and nursing note reviewed.   Constitutional:       General: He is not in acute distress.     Appearance: He is not ill-appearing.   HENT:      Mouth/Throat:      Mouth: Mucous membranes are moist.   Cardiovascular:      Rate and Rhythm: Tachycardia present.   Pulmonary:      Effort: Pulmonary effort is normal.      Breath sounds: No rhonchi.      Comments: Coarse breath sound bilaterally  Abdominal:      General: Abdomen is flat.   Skin:     General: Skin is warm.   Neurological:      General: No focal deficit present.      Mental Status: He is alert.      Comments: Alert and interactive.  Frequent tangent.             Review of Systems    Vents:       Lines/Drains/Airways       Peripheral Intravenous Line  Duration                  Peripheral IV - Single Lumen 04/03/25 1910 20 G Right Antecubital 2 days                    Significant Labs:    CBC/Anemia Profile:  Recent Labs   Lab 04/05/25  0448   WBC 11.81   HGB 13.6*   HCT 41.7      MCV 84   RDW 15.1*        Chemistries:  Recent Labs   Lab 04/05/25  0448      K 4.6      CO2 23   BUN 28*   CREATININE 0.8   CALCIUM 8.3*   ALBUMIN 3.0*   BILITOT 0.3   ALKPHOS 53   ALT 25   AST 23   GLUCOSE 180*   MG 1.8   PHOS 3.0       ABGs:   No  "results for input(s): "PH", "PCO2", "HCO3", "POCSATURATED", "BE" in the last 48 hours.    Lactic Acid:   No results for input(s): "LACTATE" in the last 48 hours.    Respiratory Culture: No results for input(s): "GSRESP", "RESPIRATORYC" in the last 48 hours.    Significant Imaging:  I have reviewed all pertinent imaging results/findings within the past 24 hours.  "

## 2025-04-06 NOTE — ASSESSMENT & PLAN NOTE
Patient's blood pressure range in the last 24 hours was: BP  Min: 102/67  Max: 121/78.The patient's inpatient anti-hypertensive regimen is listed below:  Current Antihypertensives  amLODIPine tablet 10 mg, Daily, Oral  metoprolol succinate (TOPROL-XL) 24 hr tablet 50 mg, Daily, Oral  furosemide injection 40 mg, Daily, Intravenous    Plan  - BP is controlled, no changes needed to their regimen

## 2025-04-06 NOTE — PROGRESS NOTES
[facility]  Pulmonology  Progress Note    Patient Name: Emmanuel Grant  MRN: [id number]  Admission Date: 4/3/2025  Hospital Length of Stay: 3 days  Code Status: Full Code  Attending Provider: Kristian Han MD  Primary Care Provider: Wilfredo De Souza MD   Principal Problem: Interstitial lung disease    Subjective:     Interval History: no acute issue.  Ambulating with nasal canula.  Appeared listless.  +BM      Objective:     Vital Signs (Most Recent):  Temp: 97.2 °F (36.2 °C) (04/06/25 0738)  Pulse: 64 (04/06/25 1109)  Resp: (!) 21 (04/06/25 1109)  BP: 112/71 (04/06/25 1109)  SpO2: 96 % (04/06/25 1109) Vital Signs (24h Range):  Temp:  [97 °F (36.1 °C)-97.5 °F (36.4 °C)] 97.2 °F (36.2 °C)  Pulse:  [62-98] 64  Resp:  [18-24] 21  SpO2:  [96 %-99 %] 96 %  BP: (102-118)/(67-77) 112/71     Weight: 55.9 kg (123 lb 3.8 oz)  Body mass index is 18.74 kg/m².      Intake/Output Summary (Last 24 hours) at 4/6/2025 1302  Last data filed at 4/6/2025 0808  Gross per 24 hour   Intake 410 ml   Output --   Net 410 ml        Physical Exam  Vitals and nursing note reviewed.   Constitutional:       General: He is not in acute distress.     Appearance: He is not ill-appearing.   HENT:      Mouth/Throat:      Mouth: Mucous membranes are moist.   Cardiovascular:      Rate and Rhythm: Tachycardia present.   Pulmonary:      Effort: Pulmonary effort is normal.      Breath sounds: No rhonchi.      Comments: Coarse breath sound bilaterally  Abdominal:      General: Abdomen is flat.   Skin:     General: Skin is warm.   Neurological:      General: No focal deficit present.      Mental Status: He is alert.      Comments: Alert and interactive.  Frequent tangent.             Review of Systems    Vents:       Lines/Drains/Airways       Peripheral Intravenous Line  Duration                  Peripheral IV - Single Lumen 04/03/25 1910 20 G Right Antecubital 2 days                    Significant Labs:    CBC/Anemia Profile:  Recent Labs   Lab  "04/05/25  0448   WBC 11.81   HGB 13.6*   HCT 41.7      MCV 84   RDW 15.1*        Chemistries:  Recent Labs   Lab 04/05/25  0448      K 4.6      CO2 23   BUN 28*   CREATININE 0.8   CALCIUM 8.3*   ALBUMIN 3.0*   BILITOT 0.3   ALKPHOS 53   ALT 25   AST 23   GLUCOSE 180*   MG 1.8   PHOS 3.0       ABGs:   No results for input(s): "PH", "PCO2", "HCO3", "POCSATURATED", "BE" in the last 48 hours.    Lactic Acid:   No results for input(s): "LACTATE" in the last 48 hours.    Respiratory Culture: No results for input(s): "GSRESP", "RESPIRATORYC" in the last 48 hours.    Significant Imaging:  I have reviewed all pertinent imaging results/findings within the past 24 hours.    ABG  Recent Labs   Lab 04/03/25 2047   PH 7.376   PO2 33*   PCO2 43.9   HCO3 25.7   BE 0     Assessment/Plan:     Pulmonary  IPF (idiopathic pulmonary fibrosis)  Ct with basilar honeycombing and lack of ggo c/w uip.  Given patient's age, findings c/w ipf  Patient was not able to perform pft  Seen by Dr. Schmitt as an outpatient.    Already on ofev and also with cellcept.  Result of CT d/w patient.  He is aware of progression AEB more prominent honeycombing on 4/3/25 CT as compared to CT in 2023  Will continue with steroid.  Would recommend 10-14 prednisone  Resume ofev at the time of discharge    Chronic hypoxemic respiratory failure  Patient with Hypoxic Respiratory failure which is Acute on chronic.  he is on home oxygen at 2 LPM. Supplemental oxygen was provided and noted-      .   Signs/symptoms of respiratory failure include- increased work of breathing and lethargy. Contributing diagnoses includes -  pulmonary fibrosis  Labs and images were reviewed. Patient Has recent ABG, which has been reviewed. Will treat underlying causes and adjust management of respiratory failure as follows-   Steroid + diuresis    Cardiac/Vascular  Chronic systolic congestive heart failure  Patient has Systolic (HFrEF) heart failure that is Chronic. On " presentation their CHF was decompensated. Evidence of decompensated CHF on presentation includes: shortness of breath. Most recent BNP and echo results are listed below.  Recent Labs     04/03/25  1457   *       Latest ECHO  Results for orders placed during the hospital encounter of 04/03/25    Echo    Interpretation Summary    Left Ventricle: The left ventricle is normal in size. Normal wall thickness. Mild global hypokinesis present. There is mildly reduced systolic function with a visually estimated ejection fraction of 45 - 50%.    Right Ventricle: The right ventricle is normal in size. Systolic function is normal.    Aorta: Aortic root is mildly dilated measuring 3.82 cm.    Similar findings noted on prior report dated 3/17/25.    Current Heart Failure Medications  metoprolol succinate (TOPROL-XL) 24 hr tablet 50 mg, Daily, Oral  sacubitriL-valsartan 24-26 mg per tablet 1 tablet, 2 times daily, Oral  furosemide injection 40 mg, Daily, Intravenous    Plan  - Monitor strict I&Os and daily weights.    - Place on telemetry  - Low sodium diet  - lasix to 40 mg daily    Palliative Care  Advance care planning  4/4/25Given patient progressing dyspnea and fibrosis on CT, During this visit, I engaged the patient in the advance care planning process along with ANABELA Glaser.  The patient and I reviewed the role for advance directives and their purpose in directing future healthcare if the patient's unable to speak for him/herself.  At this point in time, the patient borderline decision-making capacity.  We discussed different extreme health states that patient could experience, and reviewed what kind of medical care patient would want in those situations.  The patient communicated that if he was comatose and had little chance of a meaningful recovery, he would is not sure if he would want machines/life-sustaining treatments used.  In addition to the above preference, patient  HAS NOT completed a living will to reflect  these preferences.  The patient HAS designated his son, Nikos Grant, as healthcare power of  to make decisions on her behalf.  In the case of cardiopulmonary arrest, patient is not sure if would want CPR, intubation or cardioversion.  At time, patient seems overwhelm and confused.  Will continue with further conversation in the company of family.      4/5/25 patient is listless today.  I am not confident patient patient is fully grasping his current situation.  Appreciate palliative inputs.  Will need on going goc discussion with family when available.            Pino Urena MD  Pulmonology  [facility] [place] [facility]

## 2025-04-06 NOTE — ASSESSMENT & PLAN NOTE
Patient has Systolic (HFrEF) heart failure that is Chronic. On presentation their CHF was decompensated. Evidence of decompensated CHF on presentation includes: shortness of breath. Most recent BNP and echo results are listed below.  Recent Labs     04/03/25  1457   *       Latest ECHO  Results for orders placed during the hospital encounter of 04/03/25    Echo    Interpretation Summary    Left Ventricle: The left ventricle is normal in size. Normal wall thickness. Mild global hypokinesis present. There is mildly reduced systolic function with a visually estimated ejection fraction of 45 - 50%.    Right Ventricle: The right ventricle is normal in size. Systolic function is normal.    Aorta: Aortic root is mildly dilated measuring 3.82 cm.    Similar findings noted on prior report dated 3/17/25.    Current Heart Failure Medications  metoprolol succinate (TOPROL-XL) 24 hr tablet 50 mg, Daily, Oral  sacubitriL-valsartan 24-26 mg per tablet 1 tablet, 2 times daily, Oral  furosemide injection 40 mg, Daily, Intravenous    Plan  - Monitor strict I&Os and daily weights.    - Place on telemetry  - Low sodium diet  - lasix to 40 mg daily

## 2025-04-07 PROBLEM — R00.0 SINUS TACHYCARDIA: Status: RESOLVED | Noted: 2025-04-03 | Resolved: 2025-04-07

## 2025-04-07 LAB
POCT GLUCOSE: 126 MG/DL (ref 70–110)
POCT GLUCOSE: 164 MG/DL (ref 70–110)
POCT GLUCOSE: 171 MG/DL (ref 70–110)
POCT GLUCOSE: 213 MG/DL (ref 70–110)
POCT GLUCOSE: 257 MG/DL (ref 70–110)

## 2025-04-07 PROCEDURE — 25000242 PHARM REV CODE 250 ALT 637 W/ HCPCS: Mod: HCNC | Performed by: STUDENT IN AN ORGANIZED HEALTH CARE EDUCATION/TRAINING PROGRAM

## 2025-04-07 PROCEDURE — 99233 SBSQ HOSP IP/OBS HIGH 50: CPT | Mod: HCNC,25,, | Performed by: NURSE PRACTITIONER

## 2025-04-07 PROCEDURE — 27000221 HC OXYGEN, UP TO 24 HOURS: Mod: HCNC

## 2025-04-07 PROCEDURE — 11000001 HC ACUTE MED/SURG PRIVATE ROOM: Mod: HCNC

## 2025-04-07 PROCEDURE — 63600175 PHARM REV CODE 636 W HCPCS: Mod: HCNC

## 2025-04-07 PROCEDURE — 63600175 PHARM REV CODE 636 W HCPCS: Mod: HCNC | Performed by: STUDENT IN AN ORGANIZED HEALTH CARE EDUCATION/TRAINING PROGRAM

## 2025-04-07 PROCEDURE — 99233 SBSQ HOSP IP/OBS HIGH 50: CPT | Mod: HCNC,,, | Performed by: INTERNAL MEDICINE

## 2025-04-07 PROCEDURE — 99900035 HC TECH TIME PER 15 MIN (STAT): Mod: HCNC

## 2025-04-07 PROCEDURE — 99900031 HC PATIENT EDUCATION (STAT): Mod: HCNC

## 2025-04-07 PROCEDURE — 25000003 PHARM REV CODE 250: Mod: HCNC | Performed by: STUDENT IN AN ORGANIZED HEALTH CARE EDUCATION/TRAINING PROGRAM

## 2025-04-07 PROCEDURE — 94761 N-INVAS EAR/PLS OXIMETRY MLT: CPT | Mod: HCNC

## 2025-04-07 PROCEDURE — 25000003 PHARM REV CODE 250: Mod: HCNC | Performed by: HOSPITALIST

## 2025-04-07 PROCEDURE — 63600175 PHARM REV CODE 636 W HCPCS: Mod: HCNC | Performed by: INTERNAL MEDICINE

## 2025-04-07 PROCEDURE — 25000003 PHARM REV CODE 250: Mod: HCNC | Performed by: INTERNAL MEDICINE

## 2025-04-07 PROCEDURE — 99497 ADVNCD CARE PLAN 30 MIN: CPT | Mod: HCNC,25,, | Performed by: NURSE PRACTITIONER

## 2025-04-07 PROCEDURE — 94640 AIRWAY INHALATION TREATMENT: CPT | Mod: HCNC

## 2025-04-07 RX ORDER — HYDROXYZINE HYDROCHLORIDE 25 MG/1
25 TABLET, FILM COATED ORAL 3 TIMES DAILY PRN
Status: DISCONTINUED | OUTPATIENT
Start: 2025-04-07 | End: 2025-04-08 | Stop reason: HOSPADM

## 2025-04-07 RX ORDER — LIDOCAINE HYDROCHLORIDE 20 MG/ML
15 SOLUTION OROPHARYNGEAL ONCE
Status: COMPLETED | OUTPATIENT
Start: 2025-04-07 | End: 2025-04-07

## 2025-04-07 RX ORDER — ACETAMINOPHEN 325 MG/1
650 TABLET ORAL EVERY 4 HOURS PRN
Status: DISCONTINUED | OUTPATIENT
Start: 2025-04-07 | End: 2025-04-08 | Stop reason: HOSPADM

## 2025-04-07 RX ORDER — ALUMINUM HYDROXIDE, MAGNESIUM HYDROXIDE, AND SIMETHICONE 1200; 120; 1200 MG/30ML; MG/30ML; MG/30ML
30 SUSPENSION ORAL ONCE
Status: COMPLETED | OUTPATIENT
Start: 2025-04-07 | End: 2025-04-07

## 2025-04-07 RX ADMIN — SIMETHICONE 80 MG: 80 TABLET, CHEWABLE ORAL at 11:04

## 2025-04-07 RX ADMIN — ARFORMOTEROL TARTRATE 15 MCG: 15 SOLUTION RESPIRATORY (INHALATION) at 07:04

## 2025-04-07 RX ADMIN — BUDESONIDE 1 MG: 0.5 INHALANT RESPIRATORY (INHALATION) at 08:04

## 2025-04-07 RX ADMIN — ENOXAPARIN SODIUM 40 MG: 40 INJECTION SUBCUTANEOUS at 04:04

## 2025-04-07 RX ADMIN — AMLODIPINE BESYLATE 10 MG: 5 TABLET ORAL at 07:04

## 2025-04-07 RX ADMIN — OXYBUTYNIN CHLORIDE 5 MG: 5 TABLET, EXTENDED RELEASE ORAL at 07:04

## 2025-04-07 RX ADMIN — INSULIN ASPART 2 UNITS: 100 INJECTION, SOLUTION INTRAVENOUS; SUBCUTANEOUS at 04:04

## 2025-04-07 RX ADMIN — LIDOCAINE HYDROCHLORIDE 15 ML: 20 SOLUTION ORAL at 11:04

## 2025-04-07 RX ADMIN — SUCRALFATE 1 G: 1 TABLET ORAL at 08:04

## 2025-04-07 RX ADMIN — ASPIRIN 81 MG CHEWABLE TABLET 81 MG: 81 TABLET CHEWABLE at 07:04

## 2025-04-07 RX ADMIN — SIMETHICONE 80 MG: 80 TABLET, CHEWABLE ORAL at 08:04

## 2025-04-07 RX ADMIN — SIMETHICONE 80 MG: 80 TABLET, CHEWABLE ORAL at 05:04

## 2025-04-07 RX ADMIN — Medication 6 MG: at 08:04

## 2025-04-07 RX ADMIN — INSULIN GLARGINE 10 UNITS: 100 INJECTION, SOLUTION SUBCUTANEOUS at 07:04

## 2025-04-07 RX ADMIN — INSULIN ASPART 2 UNITS: 100 INJECTION, SOLUTION INTRAVENOUS; SUBCUTANEOUS at 12:04

## 2025-04-07 RX ADMIN — METOPROLOL SUCCINATE 50 MG: 50 TABLET, EXTENDED RELEASE ORAL at 07:04

## 2025-04-07 RX ADMIN — MYCOPHENOLATE MOFETIL 1000 MG: 250 CAPSULE ORAL at 08:04

## 2025-04-07 RX ADMIN — HYDROXYZINE HYDROCHLORIDE 25 MG: 25 TABLET ORAL at 08:04

## 2025-04-07 RX ADMIN — POLYETHYLENE GLYCOL 3350 17 G: 17 POWDER, FOR SOLUTION ORAL at 07:04

## 2025-04-07 RX ADMIN — INSULIN ASPART 3 UNITS: 100 INJECTION, SOLUTION INTRAVENOUS; SUBCUTANEOUS at 04:04

## 2025-04-07 RX ADMIN — ALUMINUM HYDROXIDE, MAGNESIUM HYDROXIDE, AND DIMETHICONE 30 ML: 200; 20; 200 SUSPENSION ORAL at 08:04

## 2025-04-07 RX ADMIN — ACETAMINOPHEN 650 MG: 325 TABLET ORAL at 10:04

## 2025-04-07 RX ADMIN — INSULIN ASPART 2 UNITS: 100 INJECTION, SOLUTION INTRAVENOUS; SUBCUTANEOUS at 07:04

## 2025-04-07 RX ADMIN — ARFORMOTEROL TARTRATE 15 MCG: 15 SOLUTION RESPIRATORY (INHALATION) at 08:04

## 2025-04-07 RX ADMIN — BUDESONIDE 1 MG: 0.5 INHALANT RESPIRATORY (INHALATION) at 07:04

## 2025-04-07 RX ADMIN — ALUMINUM HYDROXIDE, MAGNESIUM HYDROXIDE, AND DIMETHICONE 30 ML: 200; 20; 200 SUSPENSION ORAL at 05:04

## 2025-04-07 RX ADMIN — SUCRALFATE 1 G: 1 TABLET ORAL at 04:04

## 2025-04-07 RX ADMIN — SUCRALFATE 1 G: 1 TABLET ORAL at 11:04

## 2025-04-07 RX ADMIN — INSULIN ASPART 3 UNITS: 100 INJECTION, SOLUTION INTRAVENOUS; SUBCUTANEOUS at 12:04

## 2025-04-07 RX ADMIN — PANTOPRAZOLE SODIUM 40 MG: 40 TABLET, DELAYED RELEASE ORAL at 07:04

## 2025-04-07 RX ADMIN — SENNOSIDES AND DOCUSATE SODIUM 1 TABLET: 50; 8.6 TABLET ORAL at 07:04

## 2025-04-07 RX ADMIN — ALUMINUM HYDROXIDE, MAGNESIUM HYDROXIDE, AND SIMETHICONE 30 ML: 200; 200; 20 SUSPENSION ORAL at 11:04

## 2025-04-07 RX ADMIN — SUCRALFATE 1 G: 1 TABLET ORAL at 05:04

## 2025-04-07 RX ADMIN — CEFTRIAXONE SODIUM 2 G: 2 INJECTION, POWDER, FOR SOLUTION INTRAMUSCULAR; INTRAVENOUS at 04:04

## 2025-04-07 RX ADMIN — FUROSEMIDE 40 MG: 10 INJECTION, SOLUTION INTRAVENOUS at 07:04

## 2025-04-07 RX ADMIN — SACUBITRIL AND VALSARTAN 1 TABLET: 24; 26 TABLET, FILM COATED ORAL at 08:04

## 2025-04-07 NOTE — NURSING
Patient in room door way stating that he is unable to clear throat. Offered water. Patient does not want to try water. Patient states that he takes cough drops at home. Cough drops requested. Awaiting delivery to floor.

## 2025-04-07 NOTE — ASSESSMENT & PLAN NOTE
Patient with Hypoxic Respiratory failure which is Acute on chronic.  he is on home oxygen at 2 LPM. Supplemental oxygen was provided and noted-    Signs/symptoms of respiratory failure include- increased work of breathing and lethargy. Contributing diagnoses includes -  pulmonary fibrosis  Labs and images were reviewed. Patient Has recent ABG, which has been reviewed. Will treat underlying causes and adjust management of respiratory failure as follows-     Plan:  - nasal cannula to keep SpO2 greater than 90% - he is currently on 2 LPM  - see IPF section

## 2025-04-07 NOTE — ASSESSMENT & PLAN NOTE
"Patient has Combined Systolic and Diastolic heart failure that is Chronic. Most recent BNP and echo results are listed below.  No results for input(s): "BNP" in the last 72 hours.    Latest ECHO  Results for orders placed during the hospital encounter of 04/03/25    Echo    Interpretation Summary    Left Ventricle: The left ventricle is normal in size. Normal wall thickness. Mild global hypokinesis present. There is mildly reduced systolic function with a visually estimated ejection fraction of 45 - 50%.    Right Ventricle: The right ventricle is normal in size. Systolic function is normal.    Aorta: Aortic root is mildly dilated measuring 3.82 cm.    Similar findings noted on prior report dated 3/17/25.    Current Heart Failure Medications  metoprolol succinate (TOPROL-XL) 24 hr tablet 50 mg, Daily, Oral  sacubitriL-valsartan 24-26 mg per tablet 1 tablet, 2 times daily, Oral  furosemide injection 40 mg, Daily, Intravenous    Plan  - Monitor strict I&Os and daily weights.    - Place on telemetry  - Low sodium diet  - Place on fluid restriction of 1.5 L.   - Cardiology has not been consulted  - The patient's volume status is at their baseline        "

## 2025-04-07 NOTE — PLAN OF CARE
Call received from Sonia at [facility] to inform that she met with patient and he signed all the admission paper work.

## 2025-04-07 NOTE — NURSING
[facility]  Nurses Note -- 4 Eyes      4/6/25      Skin assessed on: Q Shift      [x] No Pressure Injuries Present    [x]Prevention Measures Documented    [] Yes LDA  for Pressure Injury Previously documented     [] Yes New Pressure Injury Discovered   [] LDA for New Pressure Injury Added      Attending RN:  Kaylynn Barnes RN     Second RN:  Samira Snowden Rn

## 2025-04-07 NOTE — SUBJECTIVE & OBJECTIVE
Interval History: patient walking around room reporting his abdomen is weak from all the Bms and he has a HA      Medications:  Continuous Infusions:  Scheduled Meds:   amLODIPine  10 mg Oral Daily    arformoteroL  15 mcg Nebulization BID    aspirin  81 mg Oral Daily    budesonide  1 mg Nebulization BID    cefTRIAXone (Rocephin) IV (PEDS and ADULTS)  2 g Intravenous Q24H    enoxparin  40 mg Subcutaneous Daily    furosemide (LASIX) injection  40 mg Intravenous Daily    insulin aspart U-100  2 Units Subcutaneous TIDWM    insulin glargine U-100  10 Units Subcutaneous Daily    latanoprost  1 drop Both Eyes QHS    metoprolol succinate  50 mg Oral Daily    mycophenolate  1,000 mg Oral BID    nintedanib  150 mg Oral BID    oxybutynin  5 mg Oral Daily    pantoprazole  40 mg Oral Daily    polyethylene glycol  17 g Oral Daily    sacubitriL-valsartan  1 tablet Oral BID    senna-docusate  1 tablet Oral Daily    sucralfate  1 g Oral QID (AC & HS)     PRN Meds:  Current Facility-Administered Medications:     acetaminophen, 650 mg, Oral, Q4H PRN    aluminum-magnesium hydroxide-simethicone, 30 mL, Oral, QID PRN    bisacodyL, 10 mg, Rectal, Daily PRN    dextromethorphan-guaiFENesin  mg/5 ml, 10 mL, Oral, Q4H PRN    famotidine, 20 mg, Oral, BID PRN    glucagon (human recombinant), 1 mg, Intramuscular, PRN    glucose, 16 g, Oral, PRN    glucose, 24 g, Oral, PRN    insulin aspart U-100, 0-5 Units, Subcutaneous, QID (AC + HS) PRN    lactulose, 20 g, Oral, TID PRN    magnesium oxide, 800 mg, Oral, PRN    magnesium oxide, 800 mg, Oral, PRN    melatonin, 6 mg, Oral, Nightly PRN    naloxone, 0.02 mg, Intravenous, PRN    ondansetron, 4 mg, Intravenous, Q8H PRN    oxyCODONE, 5 mg, Oral, Q6H PRN    potassium bicarbonate, 35 mEq, Oral, PRN    potassium bicarbonate, 50 mEq, Oral, PRN    potassium bicarbonate, 60 mEq, Oral, PRN    potassium, sodium phosphates, 2 packet, Oral, PRN    potassium, sodium phosphates, 2 packet, Oral, PRN     potassium, sodium phosphates, 2 packet, Oral, PRN    prochlorperazine, 5 mg, Intravenous, Q6H PRN    simethicone, 1 tablet, Oral, QID PRN    sodium chloride 0.9%, 10 mL, Intravenous, Q12H PRN    Objective:     Vital Signs (Most Recent):  Temp: 97.7 °F (36.5 °C) (04/07/25 0732)  Pulse: 64 (04/07/25 0732)  Resp: 18 (04/07/25 0732)  BP: 129/86 (04/07/25 0805)  SpO2: 100 % (04/07/25 0732) Vital Signs (24h Range):  Temp:  [97.4 °F (36.3 °C)-98 °F (36.7 °C)] 97.7 °F (36.5 °C)  Pulse:  [63-95] 64  Resp:  [18-21] 18  SpO2:  [94 %-100 %] 100 %  BP: ()/(66-86) 129/86     Weight: 55.9 kg (123 lb 3.8 oz)  Body mass index is 18.74 kg/m².       Physical Exam  Vitals reviewed.   Constitutional:       General: He is not in acute distress.     Appearance: He is underweight. He is ill-appearing.   Cardiovascular:      Rate and Rhythm: Normal rate.   Pulmonary:      Effort: No respiratory distress.      Comments: Oxygen on  Abdominal:      General: Abdomen is flat.      Palpations: Abdomen is soft.   Musculoskeletal:      Right lower leg: No edema.      Left lower leg: No edema.   Skin:     General: Skin is warm and dry.   Neurological:      Mental Status: He is alert and oriented to person, place, and time.      Motor: Weakness present.   Psychiatric:         Mood and Affect: Mood normal.         Behavior: Behavior normal.            Palliative Exam    Advance Care Planning   Advance Directives:   Goals of Care: What is most important right now is to focus on spending time at home, avoiding the hospital, remaining as independent as possible, symptom/pain control, quality of life, even if it means sacrificing a little time. Accordingly, we have decided that the best plan to meet the patient's goals includes continuing with treatment.         Significant Labs: All pertinent labs within the past 24 hours have been reviewed.  CBC:   Recent Labs   Lab 04/05/25  0448   WBC 11.81   HGB 13.6*   HCT 41.7   MCV 84        BMP:  No  "results for input(s): "GLU", "NA", "K", "CL", "CO2", "BUN", "CREATININE", "CALCIUM", "MG" in the last 24 hours.  LFT:  Lab Results   Component Value Date    AST 23 04/05/2025    ALKPHOS 53 04/05/2025    BILITOT 0.3 04/05/2025     Albumin:   Albumin   Date Value Ref Range Status   04/05/2025 3.0 (L) 3.5 - 5.2 g/dL Final   03/17/2025 2.9 (L) 3.5 - 5.2 g/dL Final     Protein:   Total Protein   Date Value Ref Range Status   03/17/2025 6.7 6.0 - 8.4 g/dL Final     Lactic acid:   Lab Results   Component Value Date    LACTATE 1.8 04/03/2025    LACTATE 2.5 (H) 04/03/2025       Significant Imaging:  nothing new since 4/3  "

## 2025-04-07 NOTE — SUBJECTIVE & OBJECTIVE
"Interval History: no acute events noted. On 2 LPM. Complaints of abdominal and chest discomfort, which he reports is chronic.      Objective:     Vital Signs (Most Recent):  Temp: 97.4 °F (36.3 °C) (04/07/25 1116)  Pulse: 73 (04/07/25 1116)  Resp: (!) 22 (04/07/25 1116)  BP: 107/73 (04/07/25 1116)  SpO2: 98 % (04/07/25 1116) Vital Signs (24h Range):  Temp:  [97.4 °F (36.3 °C)-98 °F (36.7 °C)] 97.4 °F (36.3 °C)  Pulse:  [63-95] 73  Resp:  [18-22] 22  SpO2:  [94 %-100 %] 98 %  BP: ()/(66-86) 107/73     Weight: 55.9 kg (123 lb 3.8 oz)  Body mass index is 18.74 kg/m².      Intake/Output Summary (Last 24 hours) at 4/7/2025 1414  Last data filed at 4/7/2025 0943  Gross per 24 hour   Intake 480 ml   Output 400 ml   Net 80 ml        Physical Exam  Vitals and nursing note reviewed.   Constitutional:       General: He is not in acute distress.     Appearance: He is not ill-appearing.   HENT:      Mouth/Throat:      Mouth: Mucous membranes are moist.   Cardiovascular:      Rate and Rhythm: Normal rate and regular rhythm.   Pulmonary:      Effort: Pulmonary effort is normal.      Comments: Inspiratory crackles at base  Abdominal:      General: Abdomen is flat.   Musculoskeletal:         General: No swelling.   Skin:     General: Skin is warm.   Neurological:      General: No focal deficit present.      Mental Status: He is alert.      Comments: Alert and interactive.  Frequent tangent.             Review of Systems    Vents:       Lines/Drains/Airways       Peripheral Intravenous Line  Duration                  Peripheral IV - Single Lumen 04/03/25 1910 20 G Right Antecubital 3 days                    Significant Labs:    CBC/Anemia Profile:  No results for input(s): "WBC", "HGB", "HCT", "PLT", "MCV", "RDW", "IRON", "FERRITIN", "RETIC", "FOLATE", "[id number]", "OCCULTBLOOD" in the last 48 hours.     Chemistries:  No results for input(s): "NA", "K", "CL", "CO2", "BUN", "CREATININE", "CALCIUM", "ALBUMIN", "PROT", "BILITOT", " ""ALKPHOS", "ALT", "AST", "GLUCOSE", "MG", "PHOS" in the last 48 hours.    All pertinent labs within the past 24 hours have been reviewed.    Significant Imaging:  I have reviewed all pertinent imaging results/findings within the past 24 hours.  "

## 2025-04-07 NOTE — ASSESSMENT & PLAN NOTE
Patient's blood pressure range in the last 24 hours was: BP  Min: 97/74  Max: 130/86.The patient's inpatient anti-hypertensive regimen is listed below:  Current Antihypertensives  amLODIPine tablet 10 mg, Daily, Oral  metoprolol succinate (TOPROL-XL) 24 hr tablet 50 mg, Daily, Oral  furosemide injection 40 mg, Daily, Intravenous    Plan  - BP is controlled, no changes needed to their regimen

## 2025-04-07 NOTE — SUBJECTIVE & OBJECTIVE
"Interval History: still short of breath.  Complaining of headache and abdominal discomfort.    Review of Systems   HENT:  Negative for ear discharge and ear pain.    Eyes:  Negative for discharge and itching.   Endocrine: Negative for cold intolerance and heat intolerance.   Neurological:  Negative for seizures and syncope.     Objective:     Vital Signs (Most Recent):  Temp: 97.8 °F (36.6 °C) (04/07/25 1634)  Pulse: 67 (04/07/25 1634)  Resp: 18 (04/07/25 1634)  BP: 118/71 (04/07/25 1634)  SpO2: 99 % (04/07/25 1634) Vital Signs (24h Range):  Temp:  [97.4 °F (36.3 °C)-98 °F (36.7 °C)] 97.8 °F (36.6 °C)  Pulse:  [63-95] 67  Resp:  [18-22] 18  SpO2:  [94 %-100 %] 99 %  BP: ()/(71-86) 118/71     Weight: 55.9 kg (123 lb 3.8 oz)  Body mass index is 18.74 kg/m².    Intake/Output Summary (Last 24 hours) at 4/7/2025 1704  Last data filed at 4/7/2025 1510  Gross per 24 hour   Intake 720 ml   Output 400 ml   Net 320 ml         Physical Exam  Vitals and nursing note reviewed.   Constitutional:       General: He is not in acute distress.     Appearance: He is not ill-appearing.   HENT:      Mouth/Throat:      Mouth: Mucous membranes are moist.   Cardiovascular:      Rate and Rhythm: Normal rate and regular rhythm.   Pulmonary:      Effort: Pulmonary effort is normal.      Comments: Inspiratory crackles at base  Abdominal:      General: Abdomen is flat.   Musculoskeletal:         General: No swelling.   Skin:     General: Skin is warm.   Neurological:      General: No focal deficit present.      Mental Status: He is alert.   Psychiatric:         Mood and Affect: Mood is anxious.         Speech: Speech is tangential.               Significant Labs: All pertinent labs within the past 24 hours have been reviewed.  BMP: No results for input(s): "GLU", "NA", "K", "CL", "CO2", "BUN", "CREATININE", "CALCIUM", "MG" in the last 48 hours.  CBC: No results for input(s): "WBC", "HGB", "HCT", "PLT" in the last 48 hours.    Significant " Imaging: I have reviewed all pertinent imaging results/findings within the past 24 hours.

## 2025-04-07 NOTE — NURSING
Notified Dr Montgomery that patient states midsternum chest is worst 6/10 PRS. no to left side chest pain, no radiating pain to arm. Obtained EKG results Sinus rhythm with PAC Lateral infarct age undetermined.He says maybe gas pain

## 2025-04-07 NOTE — ASSESSMENT & PLAN NOTE
Patient with Hypoxic Respiratory failure which is Chronic.  he is on home oxygen at 3 LPM.   Supplemental oxygen was provided and noted-    In the setting of ILD, he's has been followed up by pulmonology.  .   Signs/symptoms of respiratory failure include- increased work of breathing. Contributing diagnoses includes - ILD - possibly PE Labs and images were reviewed. Patient Has recent ABG, which has been reviewed. T  Treatment as above.

## 2025-04-07 NOTE — PLAN OF CARE
Changes in medical condition or discharge plan: Supplemental oxygen requirement improving with steroids. Patient still quite fatigued. Palliative care and ongoing GOC conversations. Glucoses not well controlled on steroids, will add basal/bolus insulin.     Does patient need new DME?     Follow up appts needed: PCP and pulmonology    Medically stable: No    Estimated Discharge Date: 4/9/25    Referral sent to [facility] via [facility]. Spoke with Sonia, she stated that she will meet with patient today.     04/07/25 1411   Discharge Reassessment   Assessment Type Discharge Planning Reassessment   Did the patient's condition or plan change since previous assessment? No   Discharge Plan discussed with:   (Howard Paredes/Dr. Montgomery)   Communicated DERIC with patient/caregiver Date not available/Unable to determine   Discharge Plan A Hospice/home   Discharge Plan B Home Health   DME Needed Upon Discharge  none   Transition of Care Barriers None   Why the patient remains in the hospital Requires continued medical care   Post-Acute Status   Coverage [employer]   Discharge Delays None known at this time          04/07/25 1411   Discharge Reassessment   Assessment Type Discharge Planning Reassessment   Did the patient's condition or plan change since previous assessment? No   Discharge Plan discussed with:   (Howard Paredes/Dr. Montgomery)   Communicated DERIC with patient/caregiver Date not available/Unable to determine   Discharge Plan A Hospice/home   Discharge Plan B Home Health   DME Needed Upon Discharge  none   Transition of Care Barriers None   Why the patient remains in the hospital Requires continued medical care   Post-Acute Status   Coverage [employer]   Discharge Delays None known at this time

## 2025-04-07 NOTE — ASSESSMENT & PLAN NOTE
Lab Results   Component Value Date    HGBA1C 7.8 (H) 01/02/2025     Basal, bolus and correctional insulin  [facility]

## 2025-04-07 NOTE — ASSESSMENT & PLAN NOTE
Per Dr. Urena: 4/4/25Given patient progressing dyspnea and fibrosis on CT, During this visit, I engaged the patient in the advance care planning process along with ANABELA Glaser.  The patient and I reviewed the role for advance directives and their purpose in directing future healthcare if the patient's unable to speak for him/herself.  At this point in time, the patient borderline decision-making capacity.  We discussed different extreme health states that patient could experience, and reviewed what kind of medical care patient would want in those situations.  The patient communicated that if he was comatose and had little chance of a meaningful recovery, he would is not sure if he would want machines/life-sustaining treatments used.  In addition to the above preference, patient  HAS NOT completed a living will to reflect these preferences.  The patient HAS designated his son, Nikos Grant, as healthcare power of  to make decisions on her behalf.  In the case of cardiopulmonary arrest, patient is not sure if would want CPR, intubation or cardioversion.  At time, patient seems overwhelm and confused.  Will continue with further conversation in the company of family.  4/5/25 patient is listless today.  I am not confident patient patient is fully grasping his current situation.  Appreciate palliative inputs.  Will need on going go discussion with family when available.     Palliative team following and assisting with patient to understand his chronic progressing disease process.

## 2025-04-07 NOTE — NURSING
----- Message from Edi Wright MD sent at 11/13/2019  8:47 AM CST -----  Reassure patient regarding MRI findings.  Symptoms may be associated with atypical migraine.  Refer to Ophthalmology because of left eye pressure   Patient standing in benitez complaining of gas pain and indigestion. Prn medication given.

## 2025-04-07 NOTE — ASSESSMENT & PLAN NOTE
Hx of IPF referred from endocrinologist office  CT chest on presentation showed features consistent with IPF  Continue CellCept  Continue prednisone taper  Continue empiric ceftriaxone given elevated procalcitonin  Consult pulmonary input.  Patient with persistent dyspnea and difficulty treating symptoms.  Palliative Care consulted to discuss hospice care.

## 2025-04-07 NOTE — PROGRESS NOTES
[facility]  Palliative Medicine  Progress Note    Patient Name: Emmanuel Grant  MRN: [id number]  Admission Date: 4/3/2025  Hospital Length of Stay: 4 days  Code Status: Full Code   Attending Provider: Macario Montgomery MD  Consulting Provider: Joanie Pillai NP  Primary Care Physician: Wilfredo De Souza MD  Principal Problem:Interstitial lung disease  .      Assessment/Plan:   Palliative encounter:  -F/u for my colleague Janis Wakefieldhermes PEÑALOZA who is out today. (See ACP notes)  -Interval chart review  -Discussed patient with primary HM who believes patient is a mental baseline.   -Met with patient ,who was walking around room. He had the portable oxygen sat monitor on his finger, which read 91%.  -I removed the monitor and asked him how he was feeling with regard to his breathing. He replied he felt it was goo. He mentioned that he frequently checks his oxygen levels, so I encouraged him to refrain from continually monitoring his sats unless he is experiencing significant SOB. I explained that constantly looking at numbers could increase his anxiety, and advised to focus on how he feels instead of fixating on the readings.   -I assessed his knowledge about  and assisted him in understanding his lung disease and the information from the lung doctor. He noted that he was told his condition is not curable. I educated him on ILD, its likely trajectory and progression, and symptom mgmt strategies for issues such as dyspnea and anxiety which can create a continued spiral of anxiety to increased SOB to more anxiety...  -we revisited the hospice conversation he had Friday with my collegue, which he did not recall. I explained how hospice can help manage distressful symptoms when a cure is not possible,, improving his QOL and potentially keep him out of the hospital. He states he does not like coming to the hospital because he feels his condition worsens while here.He agreed hospice sounds like a good option and  "asked me to help arrange.  -addressed all questions and concerns  -emotional support offered  -updated primary HM, and CM to assist with facilitating hospice              H/O: CVA (cerebrovascular accident)  - chronic history noted      Pulmonary  * IPF (idiopathic pulmonary fibrosis)  - pt with long term hx of IPF/ILD; pt and son with good insight into pt's condition   - continued discussion of life limiting condition with pt- pt is consistently requiring hospital level care related to IPF on an increasing basis  - hospice qualifying; explained to pt that hospice would be a way to being care to him and allow for symptom management; pt with very limited reserve that is now inhibiting him from even making it to OP appts  - PCCM following; Dr. Urena reviewed past and current CT imaging with pt, pt verbalized being able to see the worsening in the imaging   - cont mgmt her HM and PCCM      Chronic hypoxemic respiratory failure  - see IPF      Cardiac/Vascular  Chronic systolic congestive heart failure  - chronic condition noted  - contributes to pt's overall condition and hospice qualification; contributes to pt's lack of reserve        Endocrine  Type 2 diabetes mellitus without complication, without long-term current use of insulin          -noted; HM to manage        Subjective:     Chief Complaint:   Chief Complaint   Patient presents with    Shortness of Breath    Tachycardia     Pt from endocrinology clinic with SOB and tachycardia. Pt placed on 3L O2 at clinic sat 100%,  in triage. Hx of MI with stent placement.     Abdominal Pain     Pt also reports abd pain and HA        HPI:   From H&P: "This is a 73-year-old male with a past medical history of ILD/pulmonary fibrosis (on 3L O2), CAD, HFrEF (EF: 40%), hypertension, type 2 diabetes, hyperlipidemia, CVA, BPH who presents with shortness of breath.      Patient presented to endocrinology clinic for routine follow-up, and was noted to be dyspneic.  He was " "referred to the ED for further evaluation. On my examination, patient was altered after receiving ativan, but reportedly he had worsening dyspnea, dry cough and abdominal pain. Of note, the patient had 3 hospitalizations over the last month: 1) 3/16-3/17, for ACS r/o, 2) 3/23-3/25 for acute on chronic hypoxia in the setting of ILD/HF, 3) 3/29-4/1 for ILD flare.      In the ED, the patient was tachycardic (120s-140s), tachypneic (20s-40s), was placed on 3 L O2.  Labs were remarkable for leukocytosis (15.2), elevated D-dimer (1.55), elevated BNP (115), elevated lactic acid (3.6  > 1.8), elevated procalcitonin (0.33), low TSH (0.143).  UDS was positive for opioids.  UA was unremarkable.  Chest x-ray showed chronic interstitial appearance, with no acute process.  CT abdomen and pelvis showed mild wall thickening/increased mucosal enhancement involving the gastric antral and pyloric regions (possible acute gastritis).  Patient was given 500 mL of NS, vancomycin, cefepime, Benadryl 25 mg IV, Ativan 1 mg IV, morphine 4 mg IV, Zofran 4 mg IV, Carafate 1 g p.o..  He was admitted for further management."     Palliative medicine consulted for goals of care discussion, continuity of care, and advance care planning; for details of visit, see advance care planning section of plan.       Hospital Course:  No notes on file    Interval History: patient walking around room reporting his abdomen is weak from all the Bms and he has a HA      Medications:  Continuous Infusions:  Scheduled Meds:   amLODIPine  10 mg Oral Daily    arformoteroL  15 mcg Nebulization BID    aspirin  81 mg Oral Daily    budesonide  1 mg Nebulization BID    cefTRIAXone (Rocephin) IV (PEDS and ADULTS)  2 g Intravenous Q24H    enoxparin  40 mg Subcutaneous Daily    furosemide (LASIX) injection  40 mg Intravenous Daily    insulin aspart U-100  2 Units Subcutaneous TIDWM    insulin glargine U-100  10 Units Subcutaneous Daily    latanoprost  1 drop Both Eyes QHS    " metoprolol succinate  50 mg Oral Daily    mycophenolate  1,000 mg Oral BID    nintedanib  150 mg Oral BID    oxybutynin  5 mg Oral Daily    pantoprazole  40 mg Oral Daily    polyethylene glycol  17 g Oral Daily    sacubitriL-valsartan  1 tablet Oral BID    senna-docusate  1 tablet Oral Daily    sucralfate  1 g Oral QID (AC & HS)     PRN Meds:  Current Facility-Administered Medications:     acetaminophen, 650 mg, Oral, Q4H PRN    aluminum-magnesium hydroxide-simethicone, 30 mL, Oral, QID PRN    bisacodyL, 10 mg, Rectal, Daily PRN    dextromethorphan-guaiFENesin  mg/5 ml, 10 mL, Oral, Q4H PRN    famotidine, 20 mg, Oral, BID PRN    glucagon (human recombinant), 1 mg, Intramuscular, PRN    glucose, 16 g, Oral, PRN    glucose, 24 g, Oral, PRN    insulin aspart U-100, 0-5 Units, Subcutaneous, QID (AC + HS) PRN    lactulose, 20 g, Oral, TID PRN    magnesium oxide, 800 mg, Oral, PRN    magnesium oxide, 800 mg, Oral, PRN    melatonin, 6 mg, Oral, Nightly PRN    naloxone, 0.02 mg, Intravenous, PRN    ondansetron, 4 mg, Intravenous, Q8H PRN    oxyCODONE, 5 mg, Oral, Q6H PRN    potassium bicarbonate, 35 mEq, Oral, PRN    potassium bicarbonate, 50 mEq, Oral, PRN    potassium bicarbonate, 60 mEq, Oral, PRN    potassium, sodium phosphates, 2 packet, Oral, PRN    potassium, sodium phosphates, 2 packet, Oral, PRN    potassium, sodium phosphates, 2 packet, Oral, PRN    prochlorperazine, 5 mg, Intravenous, Q6H PRN    simethicone, 1 tablet, Oral, QID PRN    sodium chloride 0.9%, 10 mL, Intravenous, Q12H PRN    Objective:     Vital Signs (Most Recent):  Temp: 97.7 °F (36.5 °C) (04/07/25 0732)  Pulse: 64 (04/07/25 0732)  Resp: 18 (04/07/25 0732)  BP: 129/86 (04/07/25 0805)  SpO2: 100 % (04/07/25 0732) Vital Signs (24h Range):  Temp:  [97.4 °F (36.3 °C)-98 °F (36.7 °C)] 97.7 °F (36.5 °C)  Pulse:  [63-95] 64  Resp:  [18-21] 18  SpO2:  [94 %-100 %] 100 %  BP: ()/(66-86) 129/86     Weight: 55.9 kg (123 lb 3.8 oz)  Body mass index  "is 18.74 kg/m².       Physical Exam  Vitals reviewed.   Constitutional:       General: He is not in acute distress.     Appearance: He is underweight. He is ill-appearing.   Cardiovascular:      Rate and Rhythm: Normal rate.   Pulmonary:      Effort: No respiratory distress.      Comments: Oxygen on  Abdominal:      General: Abdomen is flat.      Palpations: Abdomen is soft.   Musculoskeletal:      Right lower leg: No edema.      Left lower leg: No edema.   Skin:     General: Skin is warm and dry.   Neurological:      Mental Status: He is alert and oriented to person, place, and time.      Motor: Weakness present.   Psychiatric:         Mood and Affect: Mood normal.         Behavior: Behavior normal.            Palliative Exam    Advance Care Planning  Advance Directives:   Goals of Care: What is most important right now is to focus on spending time at home, avoiding the hospital, remaining as independent as possible, symptom/pain control, quality of life, even if it means sacrificing a little time. Accordingly, we have decided that the best plan to meet the patient's goals includes continuing with treatment.         Significant Labs: All pertinent labs within the past 24 hours have been reviewed.  CBC:   Recent Labs   Lab 04/05/25  0448   WBC 11.81   HGB 13.6*   HCT 41.7   MCV 84        BMP:  No results for input(s): "GLU", "NA", "K", "CL", "CO2", "BUN", "CREATININE", "CALCIUM", "MG" in the last 24 hours.  LFT:  Lab Results   Component Value Date    AST 23 04/05/2025    ALKPHOS 53 04/05/2025    BILITOT 0.3 04/05/2025     Albumin:   Albumin   Date Value Ref Range Status   04/05/2025 3.0 (L) 3.5 - 5.2 g/dL Final   03/17/2025 2.9 (L) 3.5 - 5.2 g/dL Final     Protein:   Total Protein   Date Value Ref Range Status   03/17/2025 6.7 6.0 - 8.4 g/dL Final     Lactic acid:   Lab Results   Component Value Date    LACTATE 1.8 04/03/2025    LACTATE 2.5 (H) 04/03/2025       Significant Imaging:  nothing new since " 4/3    Joanie Pillai NP  Palliative Medicine  [facility] [place] [facility]       22 min ACP time spent in goals of care, emotional support, formulating and communicating prognosis, exploring burden/benefit of various approaches of treatment.      50% of 40 mins spent in chart review, face to face discussion, symptom assessment, coordination of care with other specialists and d/c planning.

## 2025-04-07 NOTE — ASSESSMENT & PLAN NOTE
"Patient has Systolic (HFrEF) heart failure that is Chronic. On presentation their CHF was decompensated. Evidence of decompensated CHF on presentation includes: shortness of breath. Most recent BNP and echo results are listed below.  No results for input(s): "BNP" in the last 72 hours.    Latest ECHO  Results for orders placed during the hospital encounter of 04/03/25    Echo    Interpretation Summary    Left Ventricle: The left ventricle is normal in size. Normal wall thickness. Mild global hypokinesis present. There is mildly reduced systolic function with a visually estimated ejection fraction of 45 - 50%.    Right Ventricle: The right ventricle is normal in size. Systolic function is normal.    Aorta: Aortic root is mildly dilated measuring 3.82 cm.    Similar findings noted on prior report dated 3/17/25.    Current Heart Failure Medications  metoprolol succinate (TOPROL-XL) 24 hr tablet 50 mg, Daily, Oral  sacubitriL-valsartan 24-26 mg per tablet 1 tablet, 2 times daily, Oral  furosemide injection 40 mg, Daily, Intravenous    Plan  - agree with diuresis for now  "

## 2025-04-07 NOTE — PROGRESS NOTES
[facility] Medicine  Progress Note    Patient Name: Emmanuel Grant  MRN: [id number]  Patient Class: IP- Inpatient   Admission Date: 4/3/2025  Length of Stay: 4 days  Attending Physician: Macario Montgomery MD  Primary Care Provider: Wilfredo De Souza MD        Subjective     Principal Problem:IPF (idiopathic pulmonary fibrosis)        HPI:  This is a 73-year-old male with a past medical history of ILD/pulmonary fibrosis (on 3L O2), CAD, HFrEF (EF: 40%), hypertension, type 2 diabetes, hyperlipidemia, CVA, BPH who presents with shortness of breath.     Patient presented to endocrinology clinic for routine follow-up, and was noted to be dyspneic.  He was referred to the ED for further evaluation. On my examination, patient was altered after receiving ativan, but reportedly he had worsening dyspnea, dry cough and abdominal pain. Of note, the patient had 3 hospitalizations over the last month: 1) 3/16-3/17, for ACS r/o, 2) 3/23-3/25 for acute on chronic hypoxia in the setting of ILD/HF, 3) 3/29-4/1 for ILD flare.     In the ED, the patient was tachycardic (120s-140s), tachypneic (20s-40s), was placed on 3 L O2.  Labs were remarkable for leukocytosis (15.2), elevated D-dimer (1.55), elevated BNP (115), elevated lactic acid (3.6  > 1.8), elevated procalcitonin (0.33), low TSH (0.143).  UDS was positive for opioids.  UA was unremarkable.  Chest x-ray showed chronic interstitial appearance, with no acute process.  CT abdomen and pelvis showed mild wall thickening/increased mucosal enhancement involving the gastric antral and pyloric regions (possible acute gastritis).  Patient was given 500 mL of NS, vancomycin, cefepime, Benadryl 25 mg IV, Ativan 1 mg IV, morphine 4 mg IV, Zofran 4 mg IV, Carafate 1 g p.o..  He was admitted for further management.    Overview/Hospital Course:  Patient is 74 y.o. male has a past medical history of Acute hypoxic respiratory failure (09/23/2024), BPH (benign prostatic  hyperplasia) (02/28/2024), CAD (coronary artery disease), Chronic HFrEF (heart failure with reduced ejection fraction) (05/01/2024), Diabetes mellitus, Hypertension, Kidney stone, Stroke, and Type 2 diabetes mellitus without complication, without long-term current use of insulin (10/08/2021). presented to [facility] on 4/3/25 with worsening sob and tachycardia and was admitted for acute exacerbation of interstitial pulmonary fibrosis.  Supplemental oxygen requirement improving with steroids.   Still quite fatigued. Palliative care and ongoing [facility] conversations.     Interval History: still short of breath.  Complaining of headache and abdominal discomfort.    Review of Systems   HENT:  Negative for ear discharge and ear pain.    Eyes:  Negative for discharge and itching.   Endocrine: Negative for cold intolerance and heat intolerance.   Neurological:  Negative for seizures and syncope.     Objective:     Vital Signs (Most Recent):  Temp: 97.8 °F (36.6 °C) (04/07/25 1634)  Pulse: 67 (04/07/25 1634)  Resp: 18 (04/07/25 1634)  BP: 118/71 (04/07/25 1634)  SpO2: 99 % (04/07/25 1634) Vital Signs (24h Range):  Temp:  [97.4 °F (36.3 °C)-98 °F (36.7 °C)] 97.8 °F (36.6 °C)  Pulse:  [63-95] 67  Resp:  [18-22] 18  SpO2:  [94 %-100 %] 99 %  BP: ()/(71-86) 118/71     Weight: 55.9 kg (123 lb 3.8 oz)  Body mass index is 18.74 kg/m².    Intake/Output Summary (Last 24 hours) at 4/7/2025 1704  Last data filed at 4/7/2025 1510  Gross per 24 hour   Intake 720 ml   Output 400 ml   Net 320 ml         Physical Exam  Vitals and nursing note reviewed.   Constitutional:       General: He is not in acute distress.     Appearance: He is not ill-appearing.   HENT:      Mouth/Throat:      Mouth: Mucous membranes are moist.   Cardiovascular:      Rate and Rhythm: Normal rate and regular rhythm.   Pulmonary:      Effort: Pulmonary effort is normal.      Comments: Inspiratory crackles at base  Abdominal:      General: Abdomen is flat.  "  Musculoskeletal:         General: No swelling.   Skin:     General: Skin is warm.   Neurological:      General: No focal deficit present.      Mental Status: He is alert.   Psychiatric:         Mood and Affect: Mood is anxious.         Speech: Speech is tangential.               Significant Labs: All pertinent labs within the past 24 hours have been reviewed.  BMP: No results for input(s): "GLU", "NA", "K", "CL", "CO2", "BUN", "CREATININE", "CALCIUM", "MG" in the last 48 hours.  CBC: No results for input(s): "WBC", "HGB", "HCT", "PLT" in the last 48 hours.    Significant Imaging: I have reviewed all pertinent imaging results/findings within the past 24 hours.      Assessment & Plan  IPF (idiopathic pulmonary fibrosis)  Hx of IPF referred from endocrinologist office  CT chest on presentation showed features consistent with IPF  Continue CellCept  Continue prednisone taper  Continue empiric ceftriaxone given elevated procalcitonin  Consult pulmonary input.  Patient with persistent dyspnea and difficulty treating symptoms.  Palliative Care consulted to discuss hospice care.  Type 2 diabetes mellitus without complication, without long-term current use of insulin  Lab Results   Component Value Date    HGBA1C 7.8 (H) 01/02/2025     Basal, bolus and correctional insulin  Glycemic protocol. LDSS    Coronary artery disease involving native coronary artery of native heart with angina pectoris  No acute issues.  No chest pain, shortness for breath orthopnea.  Essential hypertension  Patient's blood pressure range in the last 24 hours was: BP  Min: 97/74  Max: 130/86.The patient's inpatient anti-hypertensive regimen is listed below:  Current Antihypertensives  amLODIPine tablet 10 mg, Daily, Oral  metoprolol succinate (TOPROL-XL) 24 hr tablet 50 mg, Daily, Oral  furosemide injection 40 mg, Daily, Intravenous    Plan  - BP is controlled, no changes needed to their regimen  H/O: CVA (cerebrovascular accident)  History noted.  " "  Continue statin and risk reduction.  Continue home medications.    Chronic hypoxemic respiratory failure  Patient with Hypoxic Respiratory failure which is Chronic.  he is on home oxygen at 3 LPM.   Supplemental oxygen was provided and noted-    In the setting of ILD, he's has been followed up by pulmonology.  .   Signs/symptoms of respiratory failure include- increased work of breathing. Contributing diagnoses includes -  ILD - possibly PE  Labs and images were reviewed. Patient Has recent ABG, which has been reviewed. T  Treatment as above.  Gastritis  Stable, no abdominal pain  Continue Protonix, Carafate    Chronic systolic congestive heart failure  Patient has Combined Systolic and Diastolic heart failure that is Chronic. Most recent BNP and echo results are listed below.  No results for input(s): "BNP" in the last 72 hours.    Latest ECHO  Results for orders placed during the hospital encounter of 04/03/25    Echo    Interpretation Summary    Left Ventricle: The left ventricle is normal in size. Normal wall thickness. Mild global hypokinesis present. There is mildly reduced systolic function with a visually estimated ejection fraction of 45 - 50%.    Right Ventricle: The right ventricle is normal in size. Systolic function is normal.    Aorta: Aortic root is mildly dilated measuring 3.82 cm.    Similar findings noted on prior report dated 3/17/25.    Current Heart Failure Medications  metoprolol succinate (TOPROL-XL) 24 hr tablet 50 mg, Daily, Oral  sacubitriL-valsartan 24-26 mg per tablet 1 tablet, 2 times daily, Oral  furosemide injection 40 mg, Daily, Intravenous    Plan  - Monitor strict I&Os and daily weights.    - Place on telemetry  - Low sodium diet  - Place on fluid restriction of 1.5 L.   - Cardiology has not been consulted  - The patient's volume status is at their baseline        Advance care planning  Palliative Care consulted.    Hyperlipidemia  Continue home dose of statin.    Interstitial " lung disease exacerbation  See IPF/ILD note    VTE Risk Mitigation (From admission, onward)           Ordered     enoxaparin injection 40 mg  Daily         04/03/25 2055     IP VTE HIGH RISK PATIENT  Once         04/03/25 2055     Place sequential compression device  Until discontinued         04/03/25 2055                    Discharge Planning   DERIC: 4/9/2025     Code Status: Full Code   Medical Readiness for Discharge Date:   Discharge Plan A: Hospice/home   Discharge Delays: None known at this time                    Macario Montgomery MD  Department of Hospital Medicine   [facility]

## 2025-04-07 NOTE — PROGRESS NOTES
[facility]  Pulmonology  Progress Note    Patient Name: Emmanuel Grant  MRN: [id number]  Admission Date: 4/3/2025  Hospital Length of Stay: 4 days  Code Status: Full Code  Attending Provider: Macario Montgomery MD  Primary Care Provider: Wilfredo De Souza MD   Principal Problem: Interstitial lung disease    Subjective:     Interval History: no acute events noted. On 2 LPM. Complaints of abdominal and chest discomfort, which he reports is chronic.      Objective:     Vital Signs (Most Recent):  Temp: 97.4 °F (36.3 °C) (04/07/25 1116)  Pulse: 73 (04/07/25 1116)  Resp: (!) 22 (04/07/25 1116)  BP: 107/73 (04/07/25 1116)  SpO2: 98 % (04/07/25 1116) Vital Signs (24h Range):  Temp:  [97.4 °F (36.3 °C)-98 °F (36.7 °C)] 97.4 °F (36.3 °C)  Pulse:  [63-95] 73  Resp:  [18-22] 22  SpO2:  [94 %-100 %] 98 %  BP: ()/(66-86) 107/73     Weight: 55.9 kg (123 lb 3.8 oz)  Body mass index is 18.74 kg/m².      Intake/Output Summary (Last 24 hours) at 4/7/2025 1414  Last data filed at 4/7/2025 0943  Gross per 24 hour   Intake 480 ml   Output 400 ml   Net 80 ml        Physical Exam  Vitals and nursing note reviewed.   Constitutional:       General: He is not in acute distress.     Appearance: He is not ill-appearing.   HENT:      Mouth/Throat:      Mouth: Mucous membranes are moist.   Cardiovascular:      Rate and Rhythm: Normal rate and regular rhythm.   Pulmonary:      Effort: Pulmonary effort is normal.      Comments: Inspiratory crackles at base  Abdominal:      General: Abdomen is flat.   Musculoskeletal:         General: No swelling.   Skin:     General: Skin is warm.   Neurological:      General: No focal deficit present.      Mental Status: He is alert.      Comments: Alert and interactive.  Frequent tangent.             Review of Systems    Vents:       Lines/Drains/Airways       Peripheral Intravenous Line  Duration                  Peripheral IV - Single Lumen 04/03/25 1910 20 G Right Antecubital 3 days               "      Significant Labs:    CBC/Anemia Profile:  No results for input(s): "WBC", "HGB", "HCT", "PLT", "MCV", "RDW", "IRON", "FERRITIN", "RETIC", "FOLATE", "[id number]", "OCCULTBLOOD" in the last 48 hours.     Chemistries:  No results for input(s): "NA", "K", "CL", "CO2", "BUN", "CREATININE", "CALCIUM", "ALBUMIN", "PROT", "BILITOT", "ALKPHOS", "ALT", "AST", "GLUCOSE", "MG", "PHOS" in the last 48 hours.    All pertinent labs within the past 24 hours have been reviewed.    Significant Imaging:  I have reviewed all pertinent imaging results/findings within the past 24 hours.    ABG  Recent Labs   Lab 04/03/25 2047   PH 7.376   PO2 33*   PCO2 43.9   HCO3 25.7   BE 0     Assessment/Plan:     Pulmonary  IPF (idiopathic pulmonary fibrosis)  Ct with basilar honeycombing and lack of ggo c/w UIP pattern.  Given patient's age, findings c/w IPF  Patient was not able to perform pft  Seen by Dr. Schmitt as an outpatient.    On Ofev + Cellcept  Chronic and progressive disease, not in acute exacerbation, but significant symptom and limited functional status from this  Continue Mycophenolate  Resume ofev at the time of discharge  The pulmonary team has discussed that his disease is advanced and he has shown signs of worsening of disease and deconditioning. He was referred to lung transplant clinic by Dr. Schmitt as well, but he has not seen them yet. He is at an advanced age and has comorbid coronary artery disease makes transplant candidacy unlikely. His lack of social support locally is also a limiting factor. I again discussed all this with him on 4/7/25  Agree with evaluation for hospice. I tried calling his son (Emmanuel Grant Jr.) who is a surgeon twice but unable to reach him      Chronic hypoxemic respiratory failure  Patient with Hypoxic Respiratory failure which is Acute on chronic.  he is on home oxygen at 2 LPM. Supplemental oxygen was provided and noted-    Signs/symptoms of respiratory failure include- increased work of " "breathing and lethargy. Contributing diagnoses includes -  pulmonary fibrosis  Labs and images were reviewed. Patient Has recent ABG, which has been reviewed. Will treat underlying causes and adjust management of respiratory failure as follows-     Plan:  - nasal cannula to keep SpO2 greater than 90% - he is currently on 2 LPM  - see IPF section    Cardiac/Vascular  Chronic systolic congestive heart failure  Patient has Systolic (HFrEF) heart failure that is Chronic. On presentation their CHF was decompensated. Evidence of decompensated CHF on presentation includes: shortness of breath. Most recent BNP and echo results are listed below.  No results for input(s): "BNP" in the last 72 hours.    Latest ECHO  Results for orders placed during the hospital encounter of 04/03/25    Echo    Interpretation Summary    Left Ventricle: The left ventricle is normal in size. Normal wall thickness. Mild global hypokinesis present. There is mildly reduced systolic function with a visually estimated ejection fraction of 45 - 50%.    Right Ventricle: The right ventricle is normal in size. Systolic function is normal.    Aorta: Aortic root is mildly dilated measuring 3.82 cm.    Similar findings noted on prior report dated 3/17/25.    Current Heart Failure Medications  metoprolol succinate (TOPROL-XL) 24 hr tablet 50 mg, Daily, Oral  sacubitriL-valsartan 24-26 mg per tablet 1 tablet, 2 times daily, Oral  furosemide injection 40 mg, Daily, Intravenous    Plan  - agree with diuresis for now    Palliative Care  Advance care planning  Per Dr. Urena: 4/4/25Given patient progressing dyspnea and fibrosis on CT, During this visit, I engaged the patient in the advance care planning process along with ANABELA Glaser.  The patient and I reviewed the role for advance directives and their purpose in directing future healthcare if the patient's unable to speak for him/herself.  At this point in time, the patient borderline decision-making capacity.  We " discussed different extreme health states that patient could experience, and reviewed what kind of medical care patient would want in those situations.  The patient communicated that if he was comatose and had little chance of a meaningful recovery, he would is not sure if he would want machines/life-sustaining treatments used.  In addition to the above preference, patient  HAS NOT completed a living will to reflect these preferences.  The patient HAS designated his son, Nikos Grant, as healthcare power of  to make decisions on her behalf.  In the case of cardiopulmonary arrest, patient is not sure if would want CPR, intubation or cardioversion.  At time, patient seems overwhelm and confused.  Will continue with further conversation in the company of family.  4/5/25 patient is listless today.  I am not confident patient patient is fully grasping his current situation.  Appreciate palliative inputs.  Will need on going [facility] discussion with family when available.     Palliative team following and assisting with patient to understand his chronic progressing disease process.           J Carlos Barriga MD  Pulmonology  [facility] [place] - Med Surg

## 2025-04-07 NOTE — ASSESSMENT & PLAN NOTE
Ct with basilar honeycombing and lack of ggo c/w UIP pattern.  Given patient's age, findings c/w IPF  Patient was not able to perform pft  Seen by Dr. Schmitt as an outpatient.    On Ofev + Cellcept  Chronic and progressive disease, not in acute exacerbation, but significant symptom and limited functional status from this  Continue Mycophenolate  Resume ofev at the time of discharge  The pulmonary team has discussed that his disease is advanced and he has shown signs of worsening of disease and deconditioning. He was referred to lung transplant clinic by Dr. Schmitt as well, but he has not seen them yet. He is at an advanced age and has comorbid coronary artery disease makes transplant candidacy unlikely. His lack of social support locally is also a limiting factor. I again discussed all this with him on 4/7/25  Agree with evaluation for hospice. I tried calling his son (Emmanuel Grant Jr.) who is a surgeon twice but unable to reach him

## 2025-04-08 ENCOUNTER — TELEPHONE (OUTPATIENT)
Dept: PALLIATIVE MEDICINE | Facility: CLINIC | Age: 75
End: 2025-04-08
Payer: MEDICARE

## 2025-04-08 VITALS
BODY MASS INDEX: 18.68 KG/M2 | TEMPERATURE: 99 F | OXYGEN SATURATION: 99 % | HEIGHT: 68 IN | HEART RATE: 85 BPM | SYSTOLIC BLOOD PRESSURE: 130 MMHG | RESPIRATION RATE: 18 BRPM | WEIGHT: 123.25 LBS | DIASTOLIC BLOOD PRESSURE: 83 MMHG

## 2025-04-08 LAB
BACTERIA BLD CULT: NORMAL
BACTERIA BLD CULT: NORMAL
POCT GLUCOSE: 156 MG/DL (ref 70–110)
POCT GLUCOSE: 253 MG/DL (ref 70–110)

## 2025-04-08 PROCEDURE — 63600175 PHARM REV CODE 636 W HCPCS: Mod: HCNC | Performed by: STUDENT IN AN ORGANIZED HEALTH CARE EDUCATION/TRAINING PROGRAM

## 2025-04-08 PROCEDURE — 25000242 PHARM REV CODE 250 ALT 637 W/ HCPCS: Mod: HCNC | Performed by: STUDENT IN AN ORGANIZED HEALTH CARE EDUCATION/TRAINING PROGRAM

## 2025-04-08 PROCEDURE — 25000003 PHARM REV CODE 250: Mod: HCNC | Performed by: STUDENT IN AN ORGANIZED HEALTH CARE EDUCATION/TRAINING PROGRAM

## 2025-04-08 PROCEDURE — 94640 AIRWAY INHALATION TREATMENT: CPT | Mod: HCNC

## 2025-04-08 PROCEDURE — 63600175 PHARM REV CODE 636 W HCPCS: Mod: HCNC | Performed by: INTERNAL MEDICINE

## 2025-04-08 PROCEDURE — 94761 N-INVAS EAR/PLS OXIMETRY MLT: CPT | Mod: HCNC

## 2025-04-08 PROCEDURE — 27000221 HC OXYGEN, UP TO 24 HOURS: Mod: HCNC

## 2025-04-08 RX ADMIN — POLYETHYLENE GLYCOL 3350 17 G: 17 POWDER, FOR SOLUTION ORAL at 07:04

## 2025-04-08 RX ADMIN — PANTOPRAZOLE SODIUM 40 MG: 40 TABLET, DELAYED RELEASE ORAL at 07:04

## 2025-04-08 RX ADMIN — ASPIRIN 81 MG CHEWABLE TABLET 81 MG: 81 TABLET CHEWABLE at 07:04

## 2025-04-08 RX ADMIN — SUCRALFATE 1 G: 1 TABLET ORAL at 06:04

## 2025-04-08 RX ADMIN — SENNOSIDES AND DOCUSATE SODIUM 1 TABLET: 50; 8.6 TABLET ORAL at 07:04

## 2025-04-08 RX ADMIN — MYCOPHENOLATE MOFETIL 1000 MG: 250 CAPSULE ORAL at 08:04

## 2025-04-08 RX ADMIN — ARFORMOTEROL TARTRATE 15 MCG: 15 SOLUTION RESPIRATORY (INHALATION) at 07:04

## 2025-04-08 RX ADMIN — SUCRALFATE 1 G: 1 TABLET ORAL at 11:04

## 2025-04-08 RX ADMIN — AMLODIPINE BESYLATE 10 MG: 5 TABLET ORAL at 07:04

## 2025-04-08 RX ADMIN — INSULIN GLARGINE 10 UNITS: 100 INJECTION, SOLUTION SUBCUTANEOUS at 07:04

## 2025-04-08 RX ADMIN — INSULIN ASPART 2 UNITS: 100 INJECTION, SOLUTION INTRAVENOUS; SUBCUTANEOUS at 07:04

## 2025-04-08 RX ADMIN — SACUBITRIL AND VALSARTAN 1 TABLET: 24; 26 TABLET, FILM COATED ORAL at 08:04

## 2025-04-08 RX ADMIN — FUROSEMIDE 40 MG: 10 INJECTION, SOLUTION INTRAVENOUS at 07:04

## 2025-04-08 RX ADMIN — METOPROLOL SUCCINATE 50 MG: 50 TABLET, EXTENDED RELEASE ORAL at 07:04

## 2025-04-08 RX ADMIN — INSULIN ASPART 2 UNITS: 100 INJECTION, SOLUTION INTRAVENOUS; SUBCUTANEOUS at 11:04

## 2025-04-08 RX ADMIN — OXYBUTYNIN CHLORIDE 5 MG: 5 TABLET, EXTENDED RELEASE ORAL at 07:04

## 2025-04-08 RX ADMIN — BUDESONIDE 1 MG: 0.5 INHALANT RESPIRATORY (INHALATION) at 07:04

## 2025-04-08 RX ADMIN — INSULIN ASPART 3 UNITS: 100 INJECTION, SOLUTION INTRAVENOUS; SUBCUTANEOUS at 11:04

## 2025-04-08 NOTE — PLAN OF CARE
"[facility]  Department of Hospital Medicine  [address] [place] [address]  [phone] after hours  [phone] fax    HOSPICE  ORDERS    04/08/2025    Admit to Hospice:  Home Service     Diagnoses:   Active Hospital Problems    Diagnosis  POA    *IPF (idiopathic pulmonary fibrosis) [J84.112]  Yes    Interstitial lung disease exacerbation [J84.9]  Yes     Priority: 1 - High    Gastritis [K29.70]  Yes    Advance care planning [Z71.89]  Not Applicable    Chronic hypoxemic respiratory failure [J96.11]  Yes    H/O: CVA (cerebrovascular accident) [Z86.73]  Not Applicable     Continue ASA and statin       Chronic systolic congestive heart failure [I50.22]  Yes    Coronary artery disease involving native coronary artery of native heart with angina pectoris [I25.119]  Yes    Type 2 diabetes mellitus without complication, without long-term current use of insulin [E11.9]  Yes    Essential hypertension [I10]  Yes    Hyperlipidemia [E78.5]  Yes      Resolved Hospital Problems    Diagnosis Date Resolved POA    Sinus tachycardia [R00.0] 04/07/2025 Yes       Hospice Qualifying Diagnoses:        Patient has a life expectancy < 6 months due to:   Primary Hospice Diagnosis:  Idiopathy Pulmonary Fibrosis  Comorbid Conditions Contributing to Decline: CAD, IPF on home oxygen 3 L , DMTE, CAD, HF,   Vital Signs: Routine per Hospice Protocol.    Code Status: Full Code     Allergies:   Review of patient's allergies indicates:   Allergen Reactions    Dapagliflozin      Other reaction(s): Other (See Comments)    Pcn [penicillins]     Linagliptin Other (See Comments)     "it knocked me down", "it almost killed me"    Lisinopril Other (See Comments)     cough    Pantoprazole Hives       Diet: DM diet   Activities: As tolerated    Goals of Care Treatment Preferences:  Code Status: Full Code    Health care agent: Emmanuel Grant Jr  Health care agent number: [phone]          What is most " "important right now is to focus on spending time at home, avoiding the hospital, remaining as independent as possible, symptom/pain control, quality of life, even if it means sacrificing a little time.  Accordingly, we have decided that the best plan to meet the patient's goals includes continuing with treatment.      Nursing: Per Hospice Routine.    Routine Skin for Bedridden Patients: Apply moisture barrier cream to all skin folds and   wet areas in perineal area daily and after baths and all bowel movements.      Oxygen: 3 L NC  Other Miscellaneous Care:     Medications:          Medication List        PAUSE taking these medications      dextromethorphan-guaiFENesin  mg/5 ml  mg/5 mL liquid  Wait to take this until: April 8, 2025 Around Noon  Commonly known as: ROBITUSSIN-DM  Take 10 mLs by mouth every 4 (four) hours as needed (cough).  Ask about: Should I take this medication?            CONTINUE taking these medications      albuterol-ipratropium 2.5 mg-0.5 mg/3 mL nebulizer solution  Commonly known as: DUO-NEB  Take 3 mLs by nebulization every 4 (four) hours. Rescue     amLODIPine 10 MG tablet  Commonly known as: NORVASC  Take 1 tablet (10 mg total) by mouth once daily.     arformoteroL 15 mcg/2 mL Nebu  Commonly known as: BROVANA  Take 2 mLs (15 mcg total) by nebulization 2 (two) times daily. Controller     aspirin 81 MG Chew  Take 81 mg by mouth once daily.     BD ULTRA-FINE HEATHER PEN NEEDLE 32 gauge x 5/32" Ndle  Generic drug: pen needle, diabetic  1 each by Misc.(Non-Drug; Combo Route) route once daily.     budesonide 0.5 mg/2 mL nebulizer solution  Commonly known as: PULMICORT  Take 4 mLs (1 mg total) by nebulization 2 (two) times daily. Controller     cholecalciferol (vitamin D3) 50 mcg (2,000 unit) Cap capsule  Commonly known as: VITAMIN D3  Take 1 capsule by mouth once daily.     ENTRESTO 24-26 mg per tablet  Generic drug: sacubitriL-valsartan  Take 1 tablet by mouth 2 (two) times daily.   "   famotidine 20 MG tablet  Commonly known as: PEPCID  Take 1 tablet (20 mg total) by mouth 2 (two) times daily as needed for Heartburn.     furosemide 20 MG tablet  Commonly known as: LASIX  Take 1 tablet (20 mg total) by mouth once daily.     glimepiride 4 MG tablet  Commonly known as: AMARYL  Take 1 tablet (4 mg total) by mouth before breakfast.     insulin aspart U-100 100 unit/mL (3 mL) Inpn pen  Commonly known as: NovoLOG Flexpen U-100 Insulin  Inject as needed before meals: 180-230=+1, 231-280=+2, 281-330=+3, 331-380=+4, over 380=+5 units     latanoprost (PF) 0.005 % Drop  1 drop into affected eye in the evening Ophthalmic Once a day     metFORMIN 500 MG ER 24hr tablet  Commonly known as: GLUCOPHAGE-XR  Take 1 tablet (500 mg total) by mouth 2 (two) times daily with meals.     metoprolol succinate 50 MG 24 hr tablet  Commonly known as: TOPROL-XL  Take 1 tablet (50 mg total) by mouth once daily.     mirabegron 25 mg Tb24 ER tablet  Commonly known as: MYRBETRIQ  Take 1 tablet (25 mg total) by mouth once daily.     mycophenolate 250 mg Cap  Commonly known as: CELLCEPT  Take 4 capsules (1,000 mg total) by mouth 2 (two) times daily.     nitroGLYCERIN 0.3 MG SL tablet  Commonly known as: NITROSTAT  Place 1 tablet (0.3 mg total) under the tongue every 5 (five) minutes as needed for Chest pain.     OFEV 150 mg Cap  Generic drug: nintedanib  Take 1 capsule (150 mg total) by mouth 2 (two) times a day.     omeprazole 40 MG capsule  Commonly known as: PRILOSEC  Take 1 capsule (40 mg total) by mouth once daily.     ONETOUCH DELICA PLUS LANCET 33 gauge Misc  Generic drug: lancets  Apply topically 3 (three) times daily.     rosuvastatin 20 MG tablet  Commonly known as: CRESTOR  Take 1 tablet (20 mg total) by mouth once daily.     simethicone 80 MG chewable tablet  Commonly known as: MYLICON  Take 1 tablet (80 mg total) by mouth 3 (three) times daily as needed for Flatulence.     sucralfate 1 gram tablet  Commonly known as:  CARAFATE  Take 1 tablet (1 g total) by mouth 4 (four) times daily before meals and nightly.            STOP taking these medications      benzonatate 200 MG capsule  Commonly known as: TESSALON     predniSONE 10 MG tablet  Commonly known as: DELTASONE                DIABETES CARE:  Fingerstick blood sugar AC and HS  Report CBG < 60 or > 350 to physician.         Insulin Sliding Scale         Glucose  Novolog Insulin Subcutaneous        0 - 60   Orange juice or glucose tablet      No insulin   201-250  2 units   251-300  4 units   301-350  6 units   351-400  8 units   >400   10 units then call physician      Future Orders:  Hospice Medical Director may dictate new orders for comfortable care measures & sign death certificate.      _________________________________  Suzan Calderon NP  04/08/2025

## 2025-04-08 NOTE — PLAN OF CARE
NORMA spoke with Sonia with [facility]. Patient's documents are sighed and patient is ready for discharge.     CM met with patient at bedside to discuss discharge planning. Patient is discharging with [facility]. Patient confirmed his address with CM and informed CM that he has a key to his home.     CM will continue to follow patient until discharged.

## 2025-04-08 NOTE — DISCHARGE SUMMARY
[facility] Medicine  Discharge Summary      Patient Name: Emmanuel rGant  MRN: [id number]  ALVARO: [id number]  Patient Class: IP- Inpatient  Admission Date: 4/3/2025  Hospital Length of Stay: 5 days  Discharge Date and Time: No discharge date for patient encounter.  Attending Physician: Blanquita Hunt MD   Discharging Provider: Suzan Daniels NP  Primary Care Provider: Wilfredo De Souza MD    Primary Care Team: SUZAN DANIELS    HPI:   This is a 73-year-old male with a past medical history of ILD/pulmonary fibrosis (on 3L O2), CAD, HFrEF (EF: 40%), hypertension, type 2 diabetes, hyperlipidemia, CVA, BPH who presents with shortness of breath.     Patient presented to endocrinology clinic for routine follow-up, and was noted to be dyspneic.  He was referred to the ED for further evaluation. On my examination, patient was altered after receiving ativan, but reportedly he had worsening dyspnea, dry cough and abdominal pain. Of note, the patient had 3 hospitalizations over the last month: 1) 3/16-3/17, for ACS r/o, 2) 3/23-3/25 for acute on chronic hypoxia in the setting of ILD/HF, 3) 3/29-4/1 for ILD flare.     In the ED, the patient was tachycardic (120s-140s), tachypneic (20s-40s), was placed on 3 L O2.  Labs were remarkable for leukocytosis (15.2), elevated D-dimer (1.55), elevated BNP (115), elevated lactic acid (3.6  > 1.8), elevated procalcitonin (0.33), low TSH (0.143).  UDS was positive for opioids.  UA was unremarkable.  Chest x-ray showed chronic interstitial appearance, with no acute process.  CT abdomen and pelvis showed mild wall thickening/increased mucosal enhancement involving the gastric antral and pyloric regions (possible acute gastritis).  Patient was given 500 mL of NS, vancomycin, cefepime, Benadryl 25 mg IV, Ativan 1 mg IV, morphine 4 mg IV, Zofran 4 mg IV, Carafate 1 g p.o..  He was admitted for further management.    * No surgery found *      Hospital Course:   Patient is 74  y.o. male has a past medical history of Acute hypoxic respiratory failure (09/23/2024), BPH (benign prostatic hyperplasia) (02/28/2024), CAD (coronary artery disease), Chronic HFrEF (heart failure with reduced ejection fraction) (05/01/2024), Diabetes mellitus, Hypertension, Kidney stone, Stroke, and Type 2 diabetes mellitus without complication, without long-term current use of insulin (10/08/2021). Presented to [facility] on 4/3/25 with worsening sob and tachycardia and was admitted for acute exacerbation of interstitial pulmonary fibrosis.  Supplemental oxygen requirement improving with steroids. Steroid stopped 4/3. RIP negative. Repeat 2 D echo EF 45-50% (similar to previous EKG last month), indeterminate diastolic function. Repeat CTA chest no PE. Pulmonology team discussed worsening of his IPF. Palliative team discussed hospice with patient and son via phone and both agreed to hospice. Remained full code. Patient ambulating very frequently out of room as he does not like to say in room. VS stable and oxygenating well on 2 L NC 98-99%.        Goals of Care Treatment Preferences:  Code Status: Full Code    Health care agent: Emmanuel [facility] care agent number: [phone]          What is most important right now is to focus on spending time at home, avoiding the hospital, remaining as independent as possible, symptom/pain control, quality of life, even if it means sacrificing a little time.  Accordingly, we have decided that the best plan to meet the patient's goals includes continuing with treatment.      SDOH Screening:  The patient was screened for utility difficulties, food insecurity, transport difficulties, housing insecurity, and interpersonal safety and there were no concerns identified this admission.     Consults:   Consults (From admission, onward)          Status Ordering Provider     Inpatient consult to Palliative Care  Once        Provider:  Janis Glaser NP    Completed  KASSANDRA GUZMAN     Inpatient consult to Pulmonology  Once        Provider:  Pino Urena MD    Completed KASSANDRA GUZMAN            Assessment & Plan  IPF (idiopathic pulmonary fibrosis)  Hx of IPF referred from endocrinologist office  CT chest on presentation showed features consistent with IPF  Continue CellCept  Continue prednisone taper  Continue empiric ceftriaxone given elevated procalcitonin  Consult pulmonary input.  Patient with persistent dyspnea and difficulty treating symptoms.  Palliative Care consulted to discuss hospice care.  Type 2 diabetes mellitus without complication, without long-term current use of insulin  Lab Results   Component Value Date    HGBA1C 7.8 (H) 01/02/2025     Basal, bolus and correctional insulin  Glycemic protocol. LDSS    Coronary artery disease involving native coronary artery of native heart with angina pectoris  No acute issues.  No chest pain, shortness for breath orthopnea.  Essential hypertension  Patient's blood pressure range in the last 24 hours was: BP  Min: 105/71  Max: 130/83.The patient's inpatient anti-hypertensive regimen is listed below:  Current Antihypertensives  amLODIPine tablet 10 mg, Daily, Oral  metoprolol succinate (TOPROL-XL) 24 hr tablet 50 mg, Daily, Oral  furosemide injection 40 mg, Daily, Intravenous    Plan  - BP is controlled, no changes needed to their regimen  H/O: CVA (cerebrovascular accident)  History noted.    Continue statin and risk reduction.  Continue home medications.    Chronic hypoxemic respiratory failure  Patient with Hypoxic Respiratory failure which is Chronic.  he is on home oxygen at 3 LPM.   Supplemental oxygen was provided and noted-    In the setting of ILD, he's has been followed up by pulmonology.  .   Signs/symptoms of respiratory failure include- increased work of breathing. Contributing diagnoses includes -  ILD - possibly PE  Labs and images were reviewed. Patient Has recent ABG, which has been reviewed. T  Treatment as  "above.  Gastritis  Stable, no abdominal pain  Continue Protonix, Carafate    Chronic systolic congestive heart failure  Patient has Combined Systolic and Diastolic heart failure that is Chronic. Most recent BNP and echo results are listed below.  No results for input(s): "BNP" in the last 72 hours.    Latest ECHO  Results for orders placed during the hospital encounter of 04/03/25    Echo    Interpretation Summary    Left Ventricle: The left ventricle is normal in size. Normal wall thickness. Mild global hypokinesis present. There is mildly reduced systolic function with a visually estimated ejection fraction of 45 - 50%.    Right Ventricle: The right ventricle is normal in size. Systolic function is normal.    Aorta: Aortic root is mildly dilated measuring 3.82 cm.    Similar findings noted on prior report dated 3/17/25.    Current Heart Failure Medications  metoprolol succinate (TOPROL-XL) 24 hr tablet 50 mg, Daily, Oral  sacubitriL-valsartan 24-26 mg per tablet 1 tablet, 2 times daily, Oral  furosemide injection 40 mg, Daily, Intravenous    Plan  - Monitor strict I&Os and daily weights.    - Place on telemetry  - Low sodium diet  - Place on fluid restriction of 1.5 L.   - Cardiology has not been consulted  - The patient's volume status is at their baseline        Advance care planning  Palliative Care consulted.    Hyperlipidemia  Continue home dose of statin.    Interstitial lung disease exacerbation  See IPF/ILD note    Final Active Diagnoses:    Diagnosis Date Noted POA    PRINCIPAL PROBLEM:  IPF (idiopathic pulmonary fibrosis) [J84.112] 02/18/2025 Yes    Interstitial lung disease exacerbation [J84.9] 02/28/2024 Yes    Gastritis [K29.70] 04/03/2025 Yes    Advance care planning [Z71.89] 03/31/2025 Not Applicable    Chronic hypoxemic respiratory failure [J96.11] 12/10/2024 Yes    H/O: CVA (cerebrovascular accident) [Z86.73] 08/07/2024 Not Applicable    Chronic systolic congestive heart failure [I50.22] " "05/01/2024 Yes    Coronary artery disease involving native coronary artery of native heart with angina pectoris [I25.119] 02/28/2024 Yes    Type 2 diabetes mellitus without complication, without long-term current use of insulin [E11.9] 10/08/2021 Yes    Essential hypertension [I10] 05/29/2020 Yes    Hyperlipidemia [E78.5] 05/29/2020 Yes      Problems Resolved During this Admission:    Diagnosis Date Noted Date Resolved POA    Sinus tachycardia [R00.0] 04/03/2025 04/07/2025 Yes       Discharged Condition: stable    Disposition: Home or Self Care    Follow Up:    Patient Instructions:      Notify your health care provider if you experience any of the following:  temperature >100.4     Notify your health care provider if you experience any of the following:  difficulty breathing or increased cough     Notify your health care provider if you experience any of the following:  increased confusion or weakness     Activity as tolerated       Significant Diagnostic Studies: N/A    Pending Diagnostic Studies:       None           Medications:  Reconciled Home Medications:      Medication List        CONTINUE taking these medications      albuterol-ipratropium 2.5 mg-0.5 mg/3 mL nebulizer solution  Commonly known as: DUO-NEB  Take 3 mLs by nebulization every 4 (four) hours. Rescue     amLODIPine 10 MG tablet  Commonly known as: NORVASC  Take 1 tablet (10 mg total) by mouth once daily.     arformoteroL 15 mcg/2 mL Nebu  Commonly known as: BROVANA  Take 2 mLs (15 mcg total) by nebulization 2 (two) times daily. Controller     aspirin 81 MG Chew  Take 81 mg by mouth once daily.     BD ULTRA-FINE HEATHER PEN NEEDLE 32 gauge x 5/32" Ndle  Generic drug: pen needle, diabetic  1 each by Misc.(Non-Drug; Combo Route) route once daily.     budesonide 0.5 mg/2 mL nebulizer solution  Commonly known as: PULMICORT  Take 4 mLs (1 mg total) by nebulization 2 (two) times daily. Controller     cholecalciferol (vitamin D3) 50 mcg (2,000 unit) Cap " capsule  Commonly known as: VITAMIN D3  Take 1 capsule by mouth once daily.     ENTRESTO 24-26 mg per tablet  Generic drug: sacubitriL-valsartan  Take 1 tablet by mouth 2 (two) times daily.     famotidine 20 MG tablet  Commonly known as: PEPCID  Take 1 tablet (20 mg total) by mouth 2 (two) times daily as needed for Heartburn.     furosemide 20 MG tablet  Commonly known as: LASIX  Take 1 tablet (20 mg total) by mouth once daily.     glimepiride 4 MG tablet  Commonly known as: AMARYL  Take 1 tablet (4 mg total) by mouth before breakfast.     insulin aspart U-100 100 unit/mL (3 mL) Inpn pen  Commonly known as: NovoLOG Flexpen U-100 Insulin  Inject as needed before meals: 180-230=+1, 231-280=+2, 281-330=+3, 331-380=+4, over 380=+5 units     latanoprost (PF) 0.005 % Drop  1 drop into affected eye in the evening Ophthalmic Once a day     metFORMIN 500 MG ER 24hr tablet  Commonly known as: GLUCOPHAGE-XR  Take 1 tablet (500 mg total) by mouth 2 (two) times daily with meals.     metoprolol succinate 50 MG 24 hr tablet  Commonly known as: TOPROL-XL  Take 1 tablet (50 mg total) by mouth once daily.     mirabegron 25 mg Tb24 ER tablet  Commonly known as: MYRBETRIQ  Take 1 tablet (25 mg total) by mouth once daily.     mycophenolate 250 mg Cap  Commonly known as: CELLCEPT  Take 4 capsules (1,000 mg total) by mouth 2 (two) times daily.     nitroGLYCERIN 0.3 MG SL tablet  Commonly known as: NITROSTAT  Place 1 tablet (0.3 mg total) under the tongue every 5 (five) minutes as needed for Chest pain.     OFEV 150 mg Cap  Generic drug: nintedanib  Take 1 capsule (150 mg total) by mouth 2 (two) times a day.     omeprazole 40 MG capsule  Commonly known as: PRILOSEC  Take 1 capsule (40 mg total) by mouth once daily.     ONETOUCH DELICA PLUS LANCET 33 gauge Misc  Generic drug: lancets  Apply topically 3 (three) times daily.     rosuvastatin 20 MG tablet  Commonly known as: CRESTOR  Take 1 tablet (20 mg total) by mouth once daily.      simethicone 80 MG chewable tablet  Commonly known as: MYLICON  Take 1 tablet (80 mg total) by mouth 3 (three) times daily as needed for Flatulence.     sucralfate 1 gram tablet  Commonly known as: CARAFATE  Take 1 tablet (1 g total) by mouth 4 (four) times daily before meals and nightly.            STOP taking these medications      benzonatate 200 MG capsule  Commonly known as: TESSALON     predniSONE 10 MG tablet  Commonly known as: DELTASONE            ASK your doctor about these medications      dextromethorphan-guaiFENesin  mg/5 ml  mg/5 mL liquid  Commonly known as: ROBITUSSIN-DM  Take 10 mLs by mouth every 4 (four) hours as needed (cough).  Ask about: Should I take this medication?              Indwelling Lines/Drains at time of discharge:   Lines/Drains/Airways       None                   Time spent on the discharge of patient: 35 minutes         Suzan Calderon NP  Department of Hospital Medicine  [facility] Surg

## 2025-04-08 NOTE — NURSING
[facility]  Nurses Note -- 4 Eyes      4/7/2025       Skin assessed on: Q Shift      [x] No Pressure Injuries Present    [x]Prevention Measures Documented    [] Yes LDA  for Pressure Injury Previously documented     [] Yes New Pressure Injury Discovered   [] LDA for New Pressure Injury Added      Attending RN:  Kaylynn Barnes RN     Second RN:  Samira Snowden Rn

## 2025-04-08 NOTE — ASSESSMENT & PLAN NOTE
Lab Results   Component Value Date    HGBA1C 7.8 (H) 01/02/2025     Basal, bolus and correctional insulin  [facility]

## 2025-04-08 NOTE — PLAN OF CARE
Patient alert and anxious at times. Patient keeps pulse ox at bedside and constantly checks. Respirations even and unlabored. 02 at 2l. No distress noted. Skin warm and dry. Iv saline locked. No complications noted. Patient denies pain. Pain medication available as needed. Patient complains of indigestion/gas. Prn medication given as needed. Blood sugar monitored. No insulin given per sliding scale. Patient utilized urinal throughout shift and ambulated to bathroom as needed. Patient has no complaints at this time. Bed in lowest position. Call bell within reach. Instructed to call for any needs.     Problem: Adult Inpatient Plan of Care  Goal: Plan of Care Review  Outcome: Progressing  Flowsheets (Taken 4/8/2025 0434)  Plan of Care Reviewed With: patient  Goal: Patient-Specific Goal (Individualized)  Outcome: Progressing  Goal: Absence of Hospital-Acquired Illness or Injury  Outcome: Progressing  Intervention: Identify and Manage Fall Risk  Flowsheets (Taken 4/8/2025 0434)  Safety Promotion/Fall Prevention:   assistive device/personal item within reach   bed alarm refused   Fall Risk reviewed with patient/family   instructed to call staff for mobility   nonskid shoes/socks when out of bed     Problem: Diabetes Comorbidity  Goal: Blood Glucose Level Within Targeted Range  Outcome: Progressing  Intervention: Monitor and Manage Glycemia  Flowsheets (Taken 4/8/2025 043)  Glycemic Management: blood glucose monitored     Problem: Fall Injury Risk  Goal: Absence of Fall and Fall-Related Injury  Outcome: Progressing  Intervention: Identify and Manage Contributors  Flowsheets (Taken 4/8/2025 0434)  Self-Care Promotion:   independence encouraged   BADL personal objects within reach  Medication Review/Management:   medications reviewed   high-risk medications identified

## 2025-04-08 NOTE — ASSESSMENT & PLAN NOTE
Patient's blood pressure range in the last 24 hours was: BP  Min: 105/71  Max: 130/83.The patient's inpatient anti-hypertensive regimen is listed below:  Current Antihypertensives  amLODIPine tablet 10 mg, Daily, Oral  metoprolol succinate (TOPROL-XL) 24 hr tablet 50 mg, Daily, Oral  furosemide injection 40 mg, Daily, Intravenous    Plan  - BP is controlled, no changes needed to their regimen

## 2025-04-08 NOTE — PLAN OF CARE
Case Management Final Discharge Note    Discharge Disposition: [facility]/ Home    New DME ordered / company name: None    Relevant SDOH / Transition of Care Barriers:  None    Person available to provide assistance at home when needed and their contact information: Extended Emergency Contact Information  Primary Emergency Contact: JuanitaJordankleber    [place]  Mobile Phone: [phone]  Relation: Son  Secondary Emergency Contact: Yimi Grant  Mobile Phone: [phone]  Relation: None     Transportation: Transportation was scheduled to transport patient home.     ADT 30 order placed for Van Transportation.  Requested  time: 12:00 pm  If transportation does not arrive at ETA time nurse will be instructed to follow protocol for transportation below:  How can I get in touch directly with dispatch, if needed?                 Non-emergent dispatch: [phone]      Patient and family educated on discharge services and updated on DC plan. Bedside RN Vipin Morris Jr notified, patient clear to discharge from Case Management Perspective.     04/08/25 1038   Final Note   Assessment Type Final Discharge Note   Anticipated Discharge Disposition [facility]ospiceHome   What phone number can be called within the next 1-3 days to see how you are doing after discharge? [phone]   Hospital Resources/Appts/Education Provided Appointments scheduled and added to AVS   Post-Acute Status   Post-Acute Authorization Hospice   Hospice Status Set-up Complete/Auth obtained   Coverage [employer]   Discharge Delays None known at this time

## 2025-04-08 NOTE — NURSING
Patient's IV removed and vital signs wnl.  Patient given AVS, and VN notified.  Will continue to monitor.

## 2025-04-08 NOTE — DISCHARGE INSTRUCTIONS
Our goal at [facility] is to always give you outstanding care and exceptional service. You may receive a survey from [employer] by mail, text or e-mail in the next 24-48 hours asking about the care you received with us. The survey should only take 5-10 minutes to complete and is very important to us.     Your feedback provides us with a way to recognize our staff who work tirelessly to provide the best care! Also, your responses help us learn how to improve when your experience was below our aspiration of excellence. We are always looking for ways to improve your stay. We WILL use your feedback to continue making improvements to help us provide the highest quality care. We keep your personal information and feedback confidential. We appreciate your time completing this survey and can't wait to hear from you!!!    We look forward to your continued care with us! Thanks so much for choosing [facility] for your healthcare needs!

## 2025-04-09 ENCOUNTER — OUTPATIENT CASE MANAGEMENT (OUTPATIENT)
Dept: ADMINISTRATIVE | Facility: OTHER | Age: 75
End: 2025-04-09
Payer: MEDICARE

## 2025-04-09 ENCOUNTER — DOCUMENTATION ONLY (OUTPATIENT)
Facility: CLINIC | Age: 75
End: 2025-04-09
Payer: MEDICARE

## 2025-04-10 ENCOUNTER — OUTPATIENT CASE MANAGEMENT (OUTPATIENT)
Dept: ADMINISTRATIVE | Facility: OTHER | Age: 75
End: 2025-04-10
Payer: MEDICARE

## 2025-04-10 NOTE — PROGRESS NOTES
Outpatient Care Management  Patient Not Qualified    Patient: Emmanuel Grant  MRN:  [id number]  Date of Service:  4/10/2025  Completed by:  Kylee Hoyos RN    Chief Complaint   Patient presents with    OPCM Chart Review    OPCM Enrollment Call    Case Closure       Patient Summary           Reason Not Qualified:  Other (see comment)    Per chart review and pt reports that he is enrolled with hospice.

## 2025-04-11 ENCOUNTER — TELEPHONE (OUTPATIENT)
Dept: FAMILY MEDICINE | Facility: CLINIC | Age: 75
End: 2025-04-11
Payer: MEDICARE

## 2025-04-11 NOTE — TELEPHONE ENCOUNTER
Returned call to [facility] and left message for Rebeca spoke with JACKIE Hills. Informed that HOSFU is 04/28/2025 and Wilfredo De Souza MD is aware per request for new portable oxygen. JACKIE Hills verbalized understanding.

## 2025-04-11 NOTE — TELEPHONE ENCOUNTER
----- Message from Med Assistant Arana sent at 4/11/2025 10:34 AM CDT -----  Name of Who is Calling: Mary Rai calling for MERVAT FOX [[id number]]  What is the request in detail: Pt needs a hosp f/u scheduled, pt was discharged 4/8/25. Pt also is requesting a portable oxygen tank. Please assist.  Can the clinic reply by [facility]: No  [facility] [phone] office can speak with any care manager nurse for questions. APPOINTMENT CAN GET SCHEDULED WITH PATIENT..

## 2025-04-11 NOTE — TELEPHONE ENCOUNTER
Contacted [facility] per [facility] and offered a sooner appointment with a NP in the same office with PCP. Patient declined stating he will keep appointment with PCP on 4/28/2025.

## 2025-04-11 NOTE — TELEPHONE ENCOUNTER
----- Message from Tech Kathleen sent at 4/11/2025 11:17 AM CDT -----  Regarding: Patient call back  .Type: Patient Call BackWho called:Mary with [facility] What is the request in detail:asking for a call back from Connie Herr LPN in office Can the clinic reply by [facility]?noWould the patient rather a call back or a response via [facility]? Call Best call back number:.states to leave the message with whom ever may answer the phone; if unable to reach her. Caller states Connie has her contact information already Additional Information:

## 2025-04-11 NOTE — TELEPHONE ENCOUNTER
Returned call to [facility] spoke with JACKIE Cook who attempt to connect to Rebeca. Pari explained she could not get the call to go through and Rebeca (Rhiannon) would give me a call back.

## 2025-04-12 NOTE — PHYSICIAN QUERY
Please provide the respiratory diagnosis:    Acute and (on) Chronic Respiratory Failure with Hypoxia

## 2025-04-14 ENCOUNTER — HOSPITAL ENCOUNTER (EMERGENCY)
Facility: HOSPITAL | Age: 75
Discharge: HOME | End: 2025-04-14
Attending: INTERNAL MEDICINE
Payer: MEDICARE

## 2025-04-14 VITALS
DIASTOLIC BLOOD PRESSURE: 84 MMHG | RESPIRATION RATE: 21 BRPM | HEIGHT: 68 IN | SYSTOLIC BLOOD PRESSURE: 130 MMHG | BODY MASS INDEX: 18.64 KG/M2 | TEMPERATURE: 98 F | WEIGHT: 123 LBS | OXYGEN SATURATION: 99 % | HEART RATE: 107 BPM

## 2025-04-14 DIAGNOSIS — R05.8 SPASMODIC COUGH: ICD-10-CM

## 2025-04-14 DIAGNOSIS — J84.112 IPF (IDIOPATHIC PULMONARY FIBROSIS): ICD-10-CM

## 2025-04-14 DIAGNOSIS — R05.3 COUGH, PERSISTENT: Primary | ICD-10-CM

## 2025-04-14 DIAGNOSIS — R00.0 TACHYCARDIA: ICD-10-CM

## 2025-04-14 DIAGNOSIS — R05.9 COUGH: ICD-10-CM

## 2025-04-14 LAB
ABSOLUTE EOSINOPHIL (OHS): 0.79 K/UL
ABSOLUTE MONOCYTE (OHS): 0.73 K/UL (ref 0.3–1)
ABSOLUTE NEUTROPHIL COUNT (OHS): 7.77 K/UL (ref 1.8–7.7)
ALBUMIN SERPL BCP-MCNC: 3.1 G/DL (ref 3.5–5.2)
ALP SERPL-CCNC: 59 UNIT/L (ref 40–150)
ALT SERPL W/O P-5'-P-CCNC: 33 UNIT/L (ref 10–44)
AMPHET UR QL SCN: NEGATIVE
ANION GAP (OHS): 17 MMOL/L (ref 8–16)
AST SERPL-CCNC: 25 UNIT/L (ref 11–45)
BARBITURATE SCN PRESENT UR: NEGATIVE
BASOPHILS # BLD AUTO: 0.06 K/UL
BASOPHILS NFR BLD AUTO: 0.5 %
BENZODIAZ UR QL SCN: NEGATIVE
BILIRUB SERPL-MCNC: 0.3 MG/DL (ref 0.1–1)
BUN SERPL-MCNC: 10 MG/DL (ref 8–23)
CALCIUM SERPL-MCNC: 8.9 MG/DL (ref 8.7–10.5)
CANNABINOIDS UR QL SCN: NEGATIVE
CHLORIDE SERPL-SCNC: 102 MMOL/L (ref 95–110)
CO2 SERPL-SCNC: 19 MMOL/L (ref 23–29)
COCAINE UR QL SCN: NEGATIVE
CREAT SERPL-MCNC: 1 MG/DL (ref 0.5–1.4)
CREAT UR-MCNC: 145.5 MG/DL (ref 23–375)
ERYTHROCYTE [DISTWIDTH] IN BLOOD BY AUTOMATED COUNT: 14.2 % (ref 11.5–14.5)
GFR SERPLBLD CREATININE-BSD FMLA CKD-EPI: >60 ML/MIN/1.73/M2
GLUCOSE SERPL-MCNC: 185 MG/DL (ref 70–110)
HCT VFR BLD AUTO: 39.3 % (ref 40–54)
HGB BLD-MCNC: 13.1 GM/DL (ref 14–18)
IMM GRANULOCYTES # BLD AUTO: 0.07 K/UL (ref 0–0.04)
IMM GRANULOCYTES NFR BLD AUTO: 0.5 % (ref 0–0.5)
LYMPHOCYTES # BLD AUTO: 3.81 K/UL (ref 1–4.8)
MCH RBC QN AUTO: 27.4 PG (ref 27–31)
MCHC RBC AUTO-ENTMCNC: 33.3 G/DL (ref 32–36)
MCV RBC AUTO: 82 FL (ref 82–98)
METHADONE UR QL SCN: NEGATIVE
NUCLEATED RBC (/100WBC) (OHS): 0 /100 WBC
OPIATES UR QL SCN: NEGATIVE
PCP UR QL: NEGATIVE
PLATELET # BLD AUTO: 259 K/UL (ref 150–450)
PMV BLD AUTO: 10.2 FL (ref 9.2–12.9)
POTASSIUM SERPL-SCNC: 4.1 MMOL/L (ref 3.5–5.1)
PROT SERPL-MCNC: 6.7 GM/DL (ref 6–8.4)
RBC # BLD AUTO: 4.78 M/UL (ref 4.6–6.2)
RELATIVE EOSINOPHIL (OHS): 6 %
RELATIVE LYMPHOCYTE (OHS): 28.8 % (ref 18–48)
RELATIVE MONOCYTE (OHS): 5.5 % (ref 4–15)
RELATIVE NEUTROPHIL (OHS): 58.7 % (ref 38–73)
SODIUM SERPL-SCNC: 138 MMOL/L (ref 136–145)
WBC # BLD AUTO: 13.23 K/UL (ref 3.9–12.7)

## 2025-04-14 PROCEDURE — 94640 AIRWAY INHALATION TREATMENT: CPT | Mod: HCNC

## 2025-04-14 PROCEDURE — 96374 THER/PROPH/DIAG INJ IV PUSH: CPT | Mod: HCNC

## 2025-04-14 PROCEDURE — 27000221 HC OXYGEN, UP TO 24 HOURS: Mod: HCNC

## 2025-04-14 PROCEDURE — 99900035 HC TECH TIME PER 15 MIN (STAT): Mod: HCNC

## 2025-04-14 PROCEDURE — 80307 DRUG TEST PRSMV CHEM ANLYZR: CPT | Mod: HCNC | Performed by: INTERNAL MEDICINE

## 2025-04-14 PROCEDURE — 99285 EMERGENCY DEPT VISIT HI MDM: CPT | Mod: 25,HCNC

## 2025-04-14 PROCEDURE — 94761 N-INVAS EAR/PLS OXIMETRY MLT: CPT | Mod: HCNC

## 2025-04-14 PROCEDURE — 63600175 PHARM REV CODE 636 W HCPCS: Mod: JZ,TB,HCNC | Performed by: INTERNAL MEDICINE

## 2025-04-14 PROCEDURE — 96375 TX/PRO/DX INJ NEW DRUG ADDON: CPT | Mod: HCNC

## 2025-04-14 PROCEDURE — 25000003 PHARM REV CODE 250: Mod: HCNC | Performed by: INTERNAL MEDICINE

## 2025-04-14 PROCEDURE — 93010 ELECTROCARDIOGRAM REPORT: CPT | Mod: HCNC,,, | Performed by: INTERNAL MEDICINE

## 2025-04-14 PROCEDURE — 80053 COMPREHEN METABOLIC PANEL: CPT | Mod: HCNC | Performed by: INTERNAL MEDICINE

## 2025-04-14 PROCEDURE — 93005 ELECTROCARDIOGRAM TRACING: CPT | Mod: HCNC

## 2025-04-14 PROCEDURE — 85025 COMPLETE CBC W/AUTO DIFF WBC: CPT | Mod: HCNC | Performed by: INTERNAL MEDICINE

## 2025-04-14 RX ORDER — BENZONATATE 100 MG/1
200 CAPSULE ORAL 3 TIMES DAILY PRN
Qty: 20 CAPSULE | Refills: 0 | Status: SHIPPED | OUTPATIENT
Start: 2025-04-14 | End: 2025-04-24

## 2025-04-14 RX ORDER — DIPHENHYDRAMINE HYDROCHLORIDE 50 MG/ML
50 INJECTION, SOLUTION INTRAMUSCULAR; INTRAVENOUS
Status: COMPLETED | OUTPATIENT
Start: 2025-04-14 | End: 2025-04-14

## 2025-04-14 RX ORDER — FAMOTIDINE 10 MG/ML
20 INJECTION, SOLUTION INTRAVENOUS
Status: COMPLETED | OUTPATIENT
Start: 2025-04-14 | End: 2025-04-14

## 2025-04-14 RX ORDER — BENZONATATE 100 MG/1
100 CAPSULE ORAL 3 TIMES DAILY PRN
Status: DISCONTINUED | OUTPATIENT
Start: 2025-04-14 | End: 2025-04-15 | Stop reason: HOSPADM

## 2025-04-14 RX ORDER — DEXAMETHASONE SODIUM PHOSPHATE 4 MG/ML
12 VIAL (ML) INJECTION
Status: COMPLETED | OUTPATIENT
Start: 2025-04-14 | End: 2025-04-14

## 2025-04-14 RX ORDER — METHYLPREDNISOLONE SOD SUCC 125 MG
125 VIAL (EA) INJECTION
Status: COMPLETED | OUTPATIENT
Start: 2025-04-14 | End: 2025-04-14

## 2025-04-14 RX ADMIN — DEXAMETHASONE SODIUM PHOSPHATE 12 MG: 4 INJECTION INTRA-ARTICULAR; INTRALESIONAL; INTRAMUSCULAR; INTRAVENOUS; SOFT TISSUE at 08:04

## 2025-04-14 RX ADMIN — FAMOTIDINE 20 MG: 10 INJECTION, SOLUTION INTRAVENOUS at 08:04

## 2025-04-14 RX ADMIN — DIPHENHYDRAMINE HYDROCHLORIDE 50 MG: 50 INJECTION INTRAMUSCULAR; INTRAVENOUS at 08:04

## 2025-04-14 RX ADMIN — BENZONATATE 100 MG: 100 CAPSULE ORAL at 08:04

## 2025-04-14 RX ADMIN — METHYLPREDNISOLONE SODIUM SUCCINATE 125 MG: 125 INJECTION, POWDER, FOR SOLUTION INTRAMUSCULAR; INTRAVENOUS at 08:04

## 2025-04-15 LAB
OHS QRS DURATION: 82 MS
OHS QRS DURATION: 90 MS
OHS QTC CALCULATION: 478 MS
OHS QTC CALCULATION: 531 MS

## 2025-04-15 NOTE — ED NOTES
Bed: 18main  Expected date:   Expected time:   Means of arrival:   Comments:  EMS allergic reaction

## 2025-04-15 NOTE — ED PROVIDER NOTES
"Encounter Date: 4/14/2025       History     Chief Complaint   Patient presents with    Allergic Reaction     Pt arrived via EMS with CC itching/SOB. EMS reports pt began itching after taking a medication today then became SOB on EMS arrival. Pt was given 0.3 Epi and 50mg Benadryl IV enroute with EMS. Hx COPD.     74-year-old male with a past medical history significant for diabetes mellitus type 2, hypertension, CVA, coronary artery disease, CHF, chronic interstitial lung disease (on 3 L home O2) presents to emergency department complaining of uncontrollable coughing after taking medication today.  Denies fever/chills/nausea/vomiting/chest pain.    The history is provided by the patient. No  was used.     Review of patient's allergies indicates:   Allergen Reactions    Dapagliflozin      Other reaction(s): Other (See Comments)    Pcn [penicillins]     Linagliptin Other (See Comments)     "it knocked me down", "it almost killed me"    Lisinopril Other (See Comments)     cough    Pantoprazole Hives     Past Medical History:   Diagnosis Date    Acute hypoxic respiratory failure 09/23/2024    Oxygen therapy 24/7 now, continue 2 L oxygen therapy.      BPH (benign prostatic hyperplasia) 02/28/2024    CAD (coronary artery disease)     Sees [facility], h/o stent    Chronic HFrEF (heart failure with reduced ejection fraction) 05/01/2024    Diabetes mellitus     Hypertension     Kidney stone     Stroke     age 62    Type 2 diabetes mellitus without complication, without long-term current use of insulin 10/08/2021     Past Surgical History:   Procedure Laterality Date    24 HOUR IMPEDANCE PH MONITORING OF ESOPHAGUS IN PATIENT NOT TAKING ACID REDUCING MEDICATIONS N/A 11/13/2024    Procedure: IMPEDANCE PH STUDY, ESOPHAGEAL, 24 HOUR, IN PATIENT NOT TAKING ACID REDUCING MEDICATION;  Surgeon: Stephany Mendoza MD;  Location: [facility] ([address] [place] [address]);  Service: Endoscopy;  Laterality: N/A;  referral dr miguel/ off ppi / " prep inst sent to pt via mail  11/6- precall attempted no answer. Unable to [facility]-  11/8 - pt not called, per chart review patient currently inpatient at [place] [address]    CARDIAC CATHETERIZATION      with stent    COLONOSCOPY N/A 03/27/2024    Procedure: COLONOSCOPY;  Surgeon: Ariela Castro MD;  Location: [facility] ENDO;  Service: Endoscopy;  Laterality: N/A;    ESOPHAGEAL MANOMETRY WITH MEASUREMENT OF IMPEDANCE N/A 11/13/2024    Procedure: MANOMETRY, ESOPHAGUS, WITH IMPEDANCE MEASUREMENT;  Surgeon: Stephany Mendoza MD;  Location: [facility] ENDO ([facility] FLR);  Service: Endoscopy;  Laterality: N/A;    ESOPHAGOGASTRODUODENOSCOPY N/A 03/27/2024    Procedure: EGD (ESOPHAGOGASTRODUODENOSCOPY);  Surgeon: Ariela Castro MD;  Location: [facility] ENDO;  Service: Endoscopy;  Laterality: N/A;    LEFT HEART CATHETERIZATION Left 9/10/2024    Procedure: Left heart cath;  Surgeon: Jemal Drummond MD;  Location: [facility] CATH LAB;  Service: Cardiology;  Laterality: Left;    SHOULDER SURGERY Right      Family History   Problem Relation Name Age of Onset    Cancer Mother      Heart disease Sister      Colon cancer Sister      COPD Brother      Esophageal cancer Neg Hx       Social History[1]  Review of Systems   Constitutional:  Negative for chills and fever.   Respiratory:  Positive for cough. Negative for choking and chest tightness.    Cardiovascular:  Negative for chest pain, palpitations and leg swelling.   Gastrointestinal:  Negative for abdominal distention, abdominal pain, nausea and vomiting.   All other systems reviewed and are negative.      Physical Exam     Initial Vitals [04/14/25 2009]   BP Pulse Resp Temp SpO2   (!) 178/94 (!) 140 (!) 22 98.6 °F (37 °C) 98 %      MAP       --         Physical Exam    Nursing note and vitals reviewed.  Constitutional: He is not diaphoretic.   Patient was anxious appearing and actively coughing throughout exam   HENT:   Head: Normocephalic and atraumatic.   Right Ear: External ear  normal.   Left Ear: External ear normal.   Eyes: Conjunctivae are normal.   Neck: Neck supple.   Normal range of motion.  Cardiovascular:  Regular rhythm.           Tachycardic   Pulmonary/Chest: Breath sounds normal. No respiratory distress. He has no wheezes. He has no rhonchi. He has no rales. He exhibits no tenderness.   Abdominal: Abdomen is soft. Bowel sounds are normal.   Musculoskeletal:         General: Normal range of motion.      Cervical back: Normal range of motion and neck supple.     Neurological: He is alert. He has normal strength.   Skin: Skin is warm and dry. Capillary refill takes less than 2 seconds.   Psychiatric: He has a normal mood and affect. Thought content normal.         ED Course   Procedures  Labs Reviewed   COMPREHENSIVE METABOLIC PANEL - Abnormal       Result Value    Sodium 138      Potassium 4.1      Chloride 102      CO2 19 (*)     Glucose 185 (*)     BUN 10      Creatinine 1.0      Calcium 8.9      Protein Total 6.7      Albumin 3.1 (*)     Bilirubin Total 0.3      ALP 59      AST 25      ALT 33      Anion Gap 17 (*)     eGFR >60     CBC WITH DIFFERENTIAL - Abnormal    WBC 13.23 (*)     RBC 4.78      HGB 13.1 (*)     HCT 39.3 (*)     MCV 82      MCH 27.4      MCHC 33.3      RDW 14.2      Platelet Count 259      MPV 10.2      Nucleated RBC 0      Neut % 58.7      Lymph % 28.8      Mono % 5.5      Eos % 6.0      Basophil % 0.5      Imm Grans % 0.5      Neut # 7.77 (*)     Lymph # 3.81      Mono # 0.73      Eos # 0.79 (*)     Baso # 0.06      Imm Grans # 0.07 (*)    DRUG SCREEN PANEL, URINE EMERGENCY - Normal    Benzodiazepine, Urine Negative      Methadone, Urine Negative      Cocaine, Urine Negative      Opiates, Urine Negative      Barbiturates, Urine Negative      Amphetamines, Urine Negative      THC Negative      Phencyclidine, Urine Negative      Urine Creatinine 145.5      Narrative:     This screen includes the following classes of drugs at the listed cut-off:  "    Benzodiazepines:        200 ng/ml   Methadone:              300 ng/ml   Cocaine metabolite:     300 ng/ml   Opiates:                300 ng/ml   Barbiturates:           200 ng/ml   Amphetamines:           1000 ng/ml   Marijuana metabs (THC): 50 ng/ml   Phencyclidine (PCP):    25 ng/ml     This is a screening test. If results do not correlate with clinical presentation, then a confirmatory send out test is advised.    This report is intended for use in clinical monitoring and management of patients. It is not intended for use in employment related drug testing."   CBC W/ AUTO DIFFERENTIAL    Narrative:     The following orders were created for panel order CBC Auto Differential.  Procedure                               Abnormality         Status                     ---------                               -----------         ------                     CBC with Differential[[id number]]       Abnormal            Final result                 Please view results for these tests on the individual orders.     EKG Readings: (Independently Interpreted)   Sinus tachycardia, rate of 152 beats per minute nonspecific ST and T-wave changes, no STEMI       Imaging Results              X-Ray Chest AP Portable (Final result)  Result time 04/14/25 21:28:41      Final result by Elidia Dent MD (04/14/25 21:28:41)                   Impression:      Chronic interstitial lung abnormalities or pulmonary fibrosis.      Electronically signed by: Elidia Dent  Date:    04/14/2025  Time:    21:28               Narrative:    EXAMINATION:  AP PORTABLE CHEST    CLINICAL HISTORY:  Cough, unspecified    TECHNIQUE:  AP portable chest radiograph was submitted.    COMPARISON:  04/03/2025    FINDINGS:  AP portable chest radiograph demonstrates a cardiac silhouette within normal limits.  Bilateral coarse interstitial lung markings are present.  No new superimposed focal consolidation is detected.  There is no pneumothorax or significant " pleural fluid.                                       Medications   benzonatate capsule 100 mg (100 mg Oral Given 4/14/25 2041)   diphenhydrAMINE injection 50 mg (50 mg Intravenous Given 4/14/25 2019)   methylPREDNISolone sodium succinate injection 125 mg (125 mg Intravenous Given 4/14/25 2022)   famotidine (PF) injection 20 mg (20 mg Intravenous Given 4/14/25 2025)   dexAMETHasone injection for respiratory use 12 mg (12 mg Nebulization Given 4/14/25 2049)     Medical Decision Making  74-year-old male with a past medical history significant for diabetes mellitus type 2, hypertension, CVA, coronary artery disease, CHF, chronic interstitial lung disease (on 3 L home O2) presents to emergency department complaining of uncontrollable coughing after taking medication today.  Denies fever/chills/nausea/vomiting/chest pain.  Course of ED stay:   1. Cardiovascular-patient has been hemodynamically stable throughout ED stay with the exception of tachycardia.  Heart rate was initially in the 140s and has improved throughout ED stay.  Heart rate is 105 prior to discharge.  Patient has been chest pain-free.  EKG showed tachycardia with no acute ischemic changes.  2. Pulmonary-patient has had O2 sats of 94% or greater on 3 L O2.  Chest x-ray showed chronic interstitial abnormalities but no acute disease.  Patient received Benadryl/Solu-Medrol IV/Decadron nebulizer/Tessalon Perle.  Cough resolved after medication.  3. Hematology/infectious disease-patient has been afebrile.  CBC was reassuring with the exception of slightly elevated white blood cell count (13,000).  4. GI/nutrition/renal/endocrine-CMP was reassuring.  Patient states he feels back to his baseline and we will follow with his primary care physician within the next 2 days for re-evaluation.  Strict instructions were given to return to the emergency department if condition worsens.  He was also advised to continue home O2 as previously prescribed as well as other  maintenance medications for chronic interstitial lung disease.    Amount and/or Complexity of Data Reviewed  Labs: ordered.  Radiology: ordered.    Risk  Prescription drug management.                                      Clinical Impression:  Final diagnoses:  [R05.9] Cough  [R00.0] Tachycardia  [R05.3] Cough, persistent (Primary)  [R05.8] Spasmodic cough  [J84.112] IPF (idiopathic pulmonary fibrosis)          ED Disposition Condition    Discharge Stable          ED Prescriptions       Medication Sig Dispense Start Date End Date Auth. Provider    benzonatate (TESSALON) 100 MG capsule Take 2 capsules (200 mg total) by mouth 3 (three) times daily as needed for Cough. 20 capsule 4/14/2025 4/24/2025 Aaron Ruiz MD          Follow-up Information       Follow up With Specialties Details Why Contact Info    Wilfredo De Souza MD Internal Medicine Schedule an appointment as soon as possible for a visit in 1 day For reevaluation [address] [facility] [address]  [phone]                   [1]   Social History  Tobacco Use    Smoking status: Never     Passive exposure: Never    Smokeless tobacco: Never   Substance Use Topics    Alcohol use: No    Drug use: Never        Aaron Ruiz MD  04/14/25 [id number]

## 2025-04-21 ENCOUNTER — OFFICE VISIT (OUTPATIENT)
Dept: PULMONOLOGY | Facility: CLINIC | Age: 75
End: 2025-04-21
Payer: MEDICARE

## 2025-04-21 VITALS
WEIGHT: 128.75 LBS | HEART RATE: 119 BPM | OXYGEN SATURATION: 93 % | DIASTOLIC BLOOD PRESSURE: 62 MMHG | HEIGHT: 68 IN | BODY MASS INDEX: 19.51 KG/M2 | SYSTOLIC BLOOD PRESSURE: 112 MMHG

## 2025-04-21 DIAGNOSIS — Z71.89 ADVANCE CARE PLANNING: ICD-10-CM

## 2025-04-21 DIAGNOSIS — J96.11 CHRONIC HYPOXEMIC RESPIRATORY FAILURE: ICD-10-CM

## 2025-04-21 DIAGNOSIS — J84.112 IPF (IDIOPATHIC PULMONARY FIBROSIS): Primary | ICD-10-CM

## 2025-04-21 DIAGNOSIS — R06.02 SOB (SHORTNESS OF BREATH): ICD-10-CM

## 2025-04-21 PROCEDURE — 3051F HG A1C>EQUAL 7.0%<8.0%: CPT | Mod: HCNC,CPTII,S$GLB, | Performed by: INTERNAL MEDICINE

## 2025-04-21 PROCEDURE — 1126F AMNT PAIN NOTED NONE PRSNT: CPT | Mod: HCNC,CPTII,S$GLB, | Performed by: INTERNAL MEDICINE

## 2025-04-21 PROCEDURE — 1101F PT FALLS ASSESS-DOCD LE1/YR: CPT | Mod: HCNC,CPTII,S$GLB, | Performed by: INTERNAL MEDICINE

## 2025-04-21 PROCEDURE — 3288F FALL RISK ASSESSMENT DOCD: CPT | Mod: HCNC,CPTII,S$GLB, | Performed by: INTERNAL MEDICINE

## 2025-04-21 PROCEDURE — 99214 OFFICE O/P EST MOD 30 MIN: CPT | Mod: HCNC,S$GLB,, | Performed by: INTERNAL MEDICINE

## 2025-04-21 PROCEDURE — 99999 PR PBB SHADOW E&M-EST. PATIENT-LVL V: CPT | Mod: PBBFAC,HCNC,, | Performed by: INTERNAL MEDICINE

## 2025-04-21 PROCEDURE — 1111F DSCHRG MED/CURRENT MED MERGE: CPT | Mod: HCNC,CPTII,S$GLB, | Performed by: INTERNAL MEDICINE

## 2025-04-21 PROCEDURE — 4010F ACE/ARB THERAPY RXD/TAKEN: CPT | Mod: HCNC,CPTII,S$GLB, | Performed by: INTERNAL MEDICINE

## 2025-04-21 PROCEDURE — 3078F DIAST BP <80 MM HG: CPT | Mod: HCNC,CPTII,S$GLB, | Performed by: INTERNAL MEDICINE

## 2025-04-21 PROCEDURE — 3008F BODY MASS INDEX DOCD: CPT | Mod: HCNC,CPTII,S$GLB, | Performed by: INTERNAL MEDICINE

## 2025-04-21 PROCEDURE — 1159F MED LIST DOCD IN RCRD: CPT | Mod: HCNC,CPTII,S$GLB, | Performed by: INTERNAL MEDICINE

## 2025-04-21 PROCEDURE — 3074F SYST BP LT 130 MM HG: CPT | Mod: HCNC,CPTII,S$GLB, | Performed by: INTERNAL MEDICINE

## 2025-04-21 RX ORDER — DIPHENHYDRAMINE HYDROCHLORIDE 25 MG/1
25 CAPSULE ORAL EVERY 6 HOURS PRN
COMMUNITY
Start: 2025-04-16

## 2025-04-21 RX ORDER — MORPHINE SULFATE 20 MG/ML
SOLUTION ORAL
COMMUNITY
Start: 2025-04-10

## 2025-04-21 RX ORDER — CARVEDILOL 25 MG/1
TABLET ORAL
COMMUNITY

## 2025-04-21 RX ORDER — TIZANIDINE 2 MG/1
TABLET ORAL
COMMUNITY

## 2025-04-21 RX ORDER — GLIPIZIDE 10 MG/1
TABLET ORAL
COMMUNITY

## 2025-04-21 RX ORDER — TRAZODONE HYDROCHLORIDE 50 MG/1
TABLET ORAL
COMMUNITY

## 2025-04-21 RX ORDER — FLASH GLUCOSE SENSOR
KIT MISCELLANEOUS
COMMUNITY
Start: 2025-02-27

## 2025-04-21 RX ORDER — ATORVASTATIN CALCIUM 20 MG/1
TABLET, FILM COATED ORAL
COMMUNITY

## 2025-04-21 RX ORDER — LORAZEPAM 0.5 MG/1
0.5 TABLET ORAL EVERY 4 HOURS PRN
COMMUNITY
Start: 2025-04-10

## 2025-04-21 NOTE — ASSESSMENT & PLAN NOTE
Reordered a palliative care home consult, it looks like the he was discharged with home hospice however he says that he has not really been able to get any assistance from that.  He understands that this illness is going to likely be what is his life.  He is very tearful during our talk today and plans to talk to his son's more frankly about everything.  We also discussed talking with them about what his wishes are in case he did end up in the hospital more short of breath.  I told him I did not recommend putting him on life support and he agreed.

## 2025-04-21 NOTE — ASSESSMENT & PLAN NOTE
Hypoxic respiratory failure secondary to his severe ILD   Currently requiring 3 L at all times occasionally we will bump it up a little bit at home.    This will be unable to be weaned for the rest of his life  Now focusing on palliative care- see below for medications

## 2025-04-21 NOTE — ASSESSMENT & PLAN NOTE
His lung disease has continued to aggressively worsen.  He is continued on his steroids and CellCept and his steroids are being weaned down.    He continues to have extreme dyspnea on exertion without any relief from any of his current medications.  He has been hesitant to try morphine because he is scared of a reaction.  We discussed attempting a cough medicine with codeine at night because that seems to be when he is really struggling to get any rest.  He was willing to try that so that medication was prescribed.  Continuing his nebulizer, inhalers, and Ofev

## 2025-04-21 NOTE — PROGRESS NOTES
Reason for visit:  ILD    Patient ID:  Emmanuel Grant is a 74 y.o. male  Interval History 4/21/25  In the last 2 months since our last visit, he has been in the hospital a number of times.  At his last hospitalization palliative Care discussed possibly considering home hospice and he did agree to that.  However, since then he does not feel like he has been able to get very much assistance when he needs it.  He is still having a lot of dyspnea shortness of breath and particularly at night his cough is very bad.  He is trying to uses nebulizer at night to alleviate that but it has not seemed to help as much.  He has not tried morphine that was prescribed because he is nervous about using it.  We discussed how that could help and how maybe trying a half dose 1st to see how he responds to it.  We also had a long discussion about how living alone may not be the best option for him at this time.  He is going to discuss more with his sons    Interval history 2/27/25  After our last visit we sent him to the hospital and he was admitted for a few days but without any findings of what caused this acute exacerbation.  He was admitted again after that and then saw Dr. Fannie [facility] who re-initiated his steroid taper and started PJP prophylaxis.  He continues to have extreme dyspnea on exertion and intermittently worsening hypoxia.  He still lives alone and manages his own medications and drives himself.  He does have grown children but 1 son lives in [place] and 1 lives in [place] so he does not have anyone local that can help him very much.    Interval History 12/12/24  Since our last visit he has been in and out of the hospital a couple of times.  He is now having palliative home care and they saw him on Tuesday and were concerned for his work of breathing.  He is here today and says that since Saturday his shortness of breath has progressively gotten worse.  He has needed his oxygen constantly and at  higher levels and his breathing is very short and he feels like he can not catch his breath.  He also has had fevers and chills over the last couple of days.  He is tachycardic, tachypneic, and hypoxic so sending him to the ER for an acute evaluation    Interval History 9/23/24  Mr. Grant is feeling much better since completing the steroid taper. He is still short of breath and having coughing spells but overall feels better. He is also following with GI and possible having surgery to correct his hiatal hernia and improve his GERD symptoms.     Interval History   Since our last visit he has continued to have significant shortness of breath.  He unfortunately has also had severe acid reflux and stomach pains that are causing him significant discomfort and making his shortness of breath worse.  He has not really noted any improvement since starting inhalers or any of our last treatment.  He does not see GI again until August 10th.  But he is getting progressively worse.     History:  Mr. Grant  is a 73-year-old with newly diagnosed interstitial lung disease he has been admitted at the [facility] twice for shortness of breath and was sent home this last time with oxygen.  He had initial autoimmune evaluation that has been negative thus far.  No history of exposure to medications that would cause this that he is aware of, no family history of lung disease.  No previous heart disease.  He does get short of breath with exertion and moving around a good bit but is mostly able to do what he wants to do throughout the day.  He does use a nebulizer he feels that it does seem to help some.    Additional Pulmonary History:  Childhood Illnesses:  none  Occupational:   works in air conditioning in installation and repair  Environmental:   no pets, no seasonal allergies, no carpet in his home and no concern for mold or allergy exposures there  Tobacco/Smoking:   never smoker    Objective:     Vitals:    04/21/25 1042   BP:  "112/62   BP Location: Right arm   Patient Position: Sitting   Pulse: (!) 119   SpO2: (!) 93%   Weight: 58.4 kg (128 lb 12 oz)   Height: 5' 8" (1.727 m)         Physical Exam  Constitutional:       General: He is not in acute distress.     Appearance: He is not diaphoretic.   HENT:      Head: Normocephalic and atraumatic.      Right Ear: External ear normal.      Left Ear: External ear normal.   Eyes:      Conjunctiva/sclera: Conjunctivae normal.      Pupils: Pupils are equal, round, and reactive to light.   Neck:      Trachea: No tracheal deviation.   Cardiovascular:      Rate and Rhythm: Normal rate and regular rhythm.      Heart sounds: Normal heart sounds. No murmur heard.  Pulmonary:      Effort: Pulmonary effort is normal. No respiratory distress.      Breath sounds: No stridor. Rales present. No wheezing.   Abdominal:      General: Bowel sounds are normal. There is no distension.      Palpations: Abdomen is soft.      Tenderness: There is no abdominal tenderness.   Musculoskeletal:         General: Normal range of motion.      Cervical back: Normal range of motion and neck supple.   Skin:     General: Skin is warm and dry.      Findings: No erythema.   Neurological:      Mental Status: He is alert and oriented to person, place, and time.      Gait: Gait is intact.   Psychiatric:         Mood and Affect: Mood and affect normal.         Cognition and Memory: Memory normal.         Judgment: Judgment normal.          Personal Diagnostic Review and Interpretation   CT scans reviewed from the last 2 occurrences and do show in her lobular septal thick getting no evidence of clear UIP pattern at this time we will repeat in a few months      Pertinent Studies Reviewed & Interpreted:     Pulmonary Function Tests:   Pending     Echocardiograms:   Results for orders placed during the hospital encounter of 07/02/24    Echo    Interpretation Summary    Left Ventricle: The left ventricle is normal in size. Normal wall " thickness. Regional wall motion abnormalities and Global hypokinesis present. See diagram for wall motion findings. There is mildly reduced systolic function with a visually estimated ejection fraction of 40 - 45%. Biplane (2D) method of discs ejection fraction is 43%. Global longitudinal strain is -13.0%.    Right Ventricle: Normal right ventricular cavity size. Wall thickness is normal. Systolic function is normal.    Aortic Valve: The aortic valve is a trileaflet valve. There is mild aortic valve sclerosis.    Mitral Valve: There is mild bileaflet sclerosis. There is mild mitral annular calcification present.    Pulmonic Valve: There is mild regurgitation.    Pulmonary Artery: The estimated pulmonary artery systolic pressure is 24 mmHg.    IVC/SVC: Normal venous pressure at 3 mmHg.          Assessment & Plan:       Problem List Items Addressed This Visit          Pulmonary    Chronic hypoxemic respiratory failure    Current Assessment & Plan   Hypoxic respiratory failure secondary to his severe ILD   Currently requiring 3 L at all times occasionally we will bump it up a little bit at home.    This will be unable to be weaned for the rest of his life  Now focusing on palliative care- see below for medications         IPF (idiopathic pulmonary fibrosis) - Primary    Current Assessment & Plan   His lung disease has continued to aggressively worsen.  He is continued on his steroids and CellCept and his steroids are being weaned down.    He continues to have extreme dyspnea on exertion without any relief from any of his current medications.  He has been hesitant to try morphine because he is scared of a reaction.  We discussed attempting a cough medicine with codeine at night because that seems to be when he is really struggling to get any rest.  He was willing to try that so that medication was prescribed.  Continuing his nebulizer, inhalers, and Ofev           Relevant Medications    codeine-guaiFENesin 7.5-225 mg/5  mL Liqd    Other Relevant Orders    Ambulatory referral/consult to HOME Palliative Care       Palliative Care    Advance care planning    Current Assessment & Plan   Reordered a palliative care home consult, it looks like the he was discharged with home hospice however he says that he has not really been able to get any assistance from that.  He understands that this illness is going to likely be what is his life.  He is very tearful during our talk today and plans to talk to his son's more frankly about everything.  We also discussed talking with them about what his wishes are in case he did end up in the hospital more short of breath.  I told him I did not recommend putting him on life support and he agreed.          Other Visit Diagnoses         SOB (shortness of breath)        Relevant Medications    codeine-guaiFENesin 7.5-225 mg/5 mL Liqd    Other Relevant Orders    Ambulatory referral/consult to HOME Palliative Care             RETURN TO CLINIC IN 3 MONTHS       Portions of the record may have been created with voice-recognition software. Occasional wrong-word or sound-a-like substitutions may have occurred due to the inherent limitations of voice-recognition software. Read the chart carefully and recognize, using context, where substitutions have occurred.    Naheed Schmitt M.D.  Pulmonary/Critical Care

## 2025-04-27 PROBLEM — S22.49XA RIB FRACTURES: Status: ACTIVE | Noted: 2025-04-27

## 2025-04-27 NOTE — LETTER
May 6, 2025        No Recipients                [address] [facility]  [place] [address]  Phone: [phone]   Patient: Emmanuel Grant   MR Number: [id number]   YOB: 1950   Date of Visit: 4/27/2025       Dear Dr. Diaz Recipients:    Thank you for referring Emmanuel Grant to me for evaluation. Below are the relevant portions of my assessment and plan of care.            If you have questions, please do not hesitate to call me. I look forward to following Emmanuel along with you.    Sincerely,      Salima Rascon, RN           CC  No Recipients

## 2025-04-27 NOTE — Clinical Note
Diagnosis: Multiple rib fractures [334515]   Future Attending Provider: GRIFFIN IRAHETA [[id number]]   Special Needs:: No Special Needs [1]

## 2025-04-27 NOTE — LETTER
May 6, 2025    [facility]  [address] [place] [address]                [address] [facility] [place] [address]  Phone: [phone] Dear {MR/MRS/MS/DR:08625} Critical access hospitaldass:    ***      If you have any questions or concerns, please don't hesitate to call.    Sincerely,        Salima Rascon RN

## 2025-04-27 NOTE — ED NOTES
Pt care assumed. Pt resting in ED bed, pt on cardiac monitor, bp cuff and continuous pulse ox. Call light within reach.

## 2025-04-27 NOTE — ED PROVIDER NOTES
"Encounter Date: 4/27/2025    SCRIBE #1 NOTE: I, Endy Huston, am scribing for, and in the presence of,  Daphne Garnica PA-C. I have scribed the following portions of the note - Other sections scribed: HPI, ROS, PE.       History     Chief Complaint   Patient presents with    Fall     Arrived via  EMS Unit 4 after slip and fall this morning. Denies any LOC or hitting head. Complaining of R arm and R thoracic pain r/t landing on R side. No obvious deformities or signs of distress. On hospice for pulm fibrosis. On 3L nasal cannula at home.      Emmanuel Grant is a 74 y.o. male, with multiple PMHx including acute hypoxic respiratory failure, chronic HFrEF (04/03/2025, estimated ejection fraction is 45 - 50%), BPH, DM, CAD, HTN, hypokalemia, kidney stone, and stroke, BIB EMS to the ED with c/o right shoulder and thoracic pain s/p mechanical trip and fall x earlier this morning. Per EMS, independent historian, at bedside reports that the patient went to use the bathroom and states that he stood up too fast and lost his balance. Reports that he landed onto his right side. EMS states that the patient got up by himself after the fall, and when they arrived the patient was sitting on a chair. Notes that there was no obvious deformities or sign of distress from the patient. Patient reports in adherence with his daily medications. No other exacerbating or alleviating factors. Denies LOC and states he is unsure if he hits his head. Denies neck pain or any associated symptoms. Patient is on hospice for Pulmonary Fibrosis and is on 3L nasal cannula at home.       The history is provided by the patient, medical records and the EMS personnel. No  was used.     Review of patient's allergies indicates:   Allergen Reactions    Dapagliflozin      Other reaction(s): Other (See Comments)    Pcn [penicillins]     Linagliptin Other (See Comments)     "it knocked me down", "it almost killed me"    Lisinopril " Other (See Comments)     cough    Pantoprazole Hives     Past Medical History:   Diagnosis Date    Acute hypoxic respiratory failure 09/23/2024    Oxygen therapy 24/7 now, continue 2 L oxygen therapy.      BPH (benign prostatic hyperplasia) 02/28/2024    CAD (coronary artery disease)     Sees [facility], h/o stent    Chronic HFrEF (heart failure with reduced ejection fraction) 05/01/2024    Diabetes mellitus     Hypertension     Kidney stone     Stroke     age 62    Type 2 diabetes mellitus without complication, without long-term current use of insulin 10/08/2021     Past Surgical History:   Procedure Laterality Date    24 HOUR IMPEDANCE PH MONITORING OF ESOPHAGUS IN PATIENT NOT TAKING ACID REDUCING MEDICATIONS N/A 11/13/2024    Procedure: IMPEDANCE PH STUDY, ESOPHAGEAL, 24 HOUR, IN PATIENT NOT TAKING ACID REDUCING MEDICATION;  Surgeon: Stephany Mendoza MD;  Location: [facility] (4TH FLR);  Service: Endoscopy;  Laterality: N/A;  referral dr miguel/ off ppi / prep inst sent to pt via mail  11/6- precall attempted no answer. Unable to [facility]-  11/8 - pt not called, per chart review patient currently inpatient at [place] [id number]    CARDIAC CATHETERIZATION      with stent    COLONOSCOPY N/A 03/27/2024    Procedure: COLONOSCOPY;  Surgeon: Ariela Castro MD;  Location: [facility] ENDO;  Service: Endoscopy;  Laterality: N/A;    ESOPHAGEAL MANOMETRY WITH MEASUREMENT OF IMPEDANCE N/A 11/13/2024    Procedure: MANOMETRY, ESOPHAGUS, WITH IMPEDANCE MEASUREMENT;  Surgeon: Stephany Mendoza MD;  Location: [facility] ENDO (4TH FLR);  Service: Endoscopy;  Laterality: N/A;    ESOPHAGOGASTRODUODENOSCOPY N/A 03/27/2024    Procedure: EGD (ESOPHAGOGASTRODUODENOSCOPY);  Surgeon: Ariela Castro MD;  Location: [facility] ENDO;  Service: Endoscopy;  Laterality: N/A;    LEFT HEART CATHETERIZATION Left 9/10/2024    Procedure: Left heart cath;  Surgeon: Jemal Drummond MD;  Location: [facility] CATH LAB;  Service: Cardiology;  Laterality: Left;    SHOULDER  SURGERY Right      Family History   Problem Relation Name Age of Onset    Cancer Mother      Heart disease Sister      Colon cancer Sister      COPD Brother      Esophageal cancer Neg Hx       Social History[1]  Review of Systems   Constitutional:  Negative for chills and fever.   HENT:  Negative for congestion and sore throat.    Eyes:  Negative for visual disturbance.   Respiratory:  Negative for cough and shortness of breath.    Cardiovascular:  Negative for chest pain.   Gastrointestinal:  Negative for abdominal pain, nausea and vomiting.   Genitourinary:  Negative for dysuria.   Musculoskeletal:  Negative for neck pain.        (+) Right shoulder pain  (+) Right thoracic pain   Skin:  Negative for rash.   Neurological:  Negative for syncope and headaches.   Psychiatric/Behavioral:  Negative for confusion.        Physical Exam     Initial Vitals [04/27/25 1130]   BP Pulse Resp Temp SpO2   (!) 154/92 72 20 97.5 °F (36.4 °C) 97 %      MAP       --         Physical Exam    Nursing note and vitals reviewed.  Constitutional: He appears well-developed and well-nourished. He is not diaphoretic. No distress. Nasal cannula in place.   HENT:   Head: Normocephalic and atraumatic.   Right Ear: External ear normal. No hemotympanum.   Left Ear: External ear normal. No hemotympanum.   Nose: No nasal septal hematoma.   Eyes: Conjunctivae and EOM are normal.   Neck: No tracheal deviation present. No JVD present.   Normal range of motion.  Cardiovascular:  Normal rate.           Normal radial pulse.   Pulmonary/Chest: No stridor. No respiratory distress. He exhibits tenderness (right lateral).   Musculoskeletal:      Right shoulder: Tenderness present.      Right elbow: No tenderness.      Right wrist: No tenderness.      Cervical back: Normal range of motion. No tenderness.      Thoracic back: No tenderness.      Lumbar back: No tenderness.      Comments: No step-off, crepitus.     Neurological: He is alert and oriented to  person, place, and time. No cranial nerve deficit or sensory deficit.   Neurovascularly intact.    Skin: No rash noted. No erythema.   Psychiatric: He has a normal mood and affect.         ED Course   Critical Care    Date/Time: 4/27/2025 4:30 PM    Performed by: Clemente Kidd MD  Authorized by: Clemente Kidd MD  Direct patient critical care time: 20 minutes  Additional history critical care time: 5 minutes  Ordering / reviewing critical care time: 10 minutes  Documentation critical care time: 10 minutes  Consulting other physicians critical care time: 10 minutes  Total critical care time (exclusive of procedural time) : 55 minutes  Critical care time was exclusive of teaching time and separately billable procedures and treating other patients.  Critical care was necessary to treat or prevent imminent or life-threatening deterioration of the following conditions: trauma and respiratory failure.  Critical care was time spent personally by me on the following activities: development of treatment plan with patient or surrogate, discussions with consultants, evaluation of patient's response to treatment, examination of patient, obtaining history from patient or surrogate, ordering and performing treatments and interventions, ordering and review of laboratory studies, ordering and review of radiographic studies, pulse oximetry, re-evaluation of patient's condition, review of old charts and interpretation of cardiac output measurements.        Labs Reviewed   POCT GLUCOSE, HAND-HELD DEVICE          Imaging Results              CT Chest Without Contrast (Final result)  Result time 04/27/25 15:44:13      Final result by Jayson Sanchez MD (04/27/25 15:44:13)                   Impression:      Multiple nondisplaced and minimally displaced right rib fractures.  No evidence for pneumothorax or pleural effusions.    Findings consistent with chronic interstitial lung disease in a UIP type pattern.    Coronary  artery atherosclerosis in a multi vessel distribution.    Right renal cysts and nonobstructing right renal calculus.      Electronically signed by: Jayson Sanchez MD  Date:    04/27/2025  Time:    15:44               Narrative:    EXAMINATION:  CT CHEST WITHOUT CONTRAST    CLINICAL HISTORY:  multiple rib fractures;    TECHNIQUE:  Low dose axial images, sagittal and coronal reformations were obtained from the thoracic inlet to the lung bases. Contrast was not administered.    COMPARISON:  CTA of the chest dated 04/03/2025.    FINDINGS:  Soft tissue structures at the base of the neck appear unremarkable.  The heart is not enlarged.  There is atherosclerotic calcification identified within the coronary arteries in a multi-vessel distribution.  There is a coronary arterial stent present.  The thoracic aorta is normal in caliber without aneurysmal dilatation.  No hilar or mediastinal adenopathy is identified.  No axillary adenopathy is identified.  Once again, there are findings of chronic interstitial lung disease in a UIP type pattern with peripheral honeycombing with lower lung predominance with traction bronchiolectasis.  When allowing for differences in technique, findings are similar to the prior study dated 04/03/2025.  There is no evidence for pneumothorax or pleural effusions.  Multiple nondisplaced and minimally displaced rib fractures are identified including fractures of the 3rd, 4th, 5th, 6th, 7th, and 8th ribs laterally and the 5th rib posteriorly.  Visualized upper abdomen demonstrates cyst within the upper pole of the right kidney and a nonobstructing calculus within the mid to lower right kidney, incompletely included on this examination.                                       X-Ray Ribs 2 View Right (Final result)  Result time 04/27/25 13:52:16      Final result by Jayson Sanchez MD (04/27/25 13:52:16)                   Impression:      Multiple acute rib fractures involving at least the right 3rd,  4th, 5th, 6, 7th, and 8th ribs laterally as well as the posterior aspect of the 5th rib.    No evidence for pneumothorax or large pleural effusion on the right.  Diffuse interstitial changes consistent with patient's history of pulmonary fibrosis.      Electronically signed by: Jayson Sanchez MD  Date:    04/27/2025  Time:    13:52               Narrative:    EXAMINATION:  XR RIBS 2 VIEW RIGHT    CLINICAL HISTORY:  Unspecified fall, initial encounter    TECHNIQUE:  Two views of the right ribs were performed.    COMPARISON:  None    FINDINGS:  There are multiple acute fractures identified involving at least the lateral aspects of the right 3rd, 4th, 5th, 6th, 7th, and 8th ribs.  There also appears to be a fracture involving the posterior aspect of the 5th rib.  There is no evidence for underlying pneumothorax or pleural effusion on the right.  There are prominent interstitial changes noted within the right lung in this patient with history of pulmonary fibrosis.                                        X-ray Shoulder 2 or More Views Right (Final result)  Result time 04/27/25 13:59:40   Procedure changed from X-Ray Shoulder Trauma Right     Final result by Hai Flynn MD (04/27/25 13:59:40)                   Impression:      This report was flagged in Epic as abnormal.    1. No acute displaced fracture or dislocation of the right shoulder.  2. Multiple right rib fractures as described.  Dedicated chest radiograph is recommended for definitive exclusion of pneumothorax.  Please see separately dictated rib series 04/27/2025 for more definitive evaluation of the rib injuries.      Electronically signed by: Hai Flynn MD  Date:    04/27/2025  Time:    13:59               Narrative:    EXAMINATION:  XR SHOULDER COMPLETE 2 OR MORE VIEWS RIGHT    CLINICAL HISTORY:  Fall;Unspecified fall, initial encounter    TECHNIQUE:  Two or three views of the right shoulder were performed.    COMPARISON:  None    FINDINGS:  Two  views right shoulder.    The right humeral head maintains appropriate relationship with the glenoid.  The acromioclavicular joint is intact.  There are fractures involving the posterolateral aspects of right ribs 3, 4, 6, 7, and 8.  There are fractures of right rib 5 in 2 places.  There is coarse interstitial attenuation throughout the parenchyma, may reflect edema.                                       CT Head Without Contrast (Final result)  Result time 04/27/25 13:01:21      Final result by Hai Flynn MD (04/27/25 13:01:21)                   Impression:      1. Allowing for motion artifact, no convincing acute intracranial abnormalities noting sequela of chronic microvascular ischemic change, senescent change, and remote lacunar infarcts.  2. Sinus disease.      Electronically signed by: Hai Flynn MD  Date:    04/27/2025  Time:    13:01               Narrative:    EXAMINATION:  CT HEAD WITHOUT CONTRAST    CLINICAL HISTORY:  Head trauma, minor (Age >= 65y);    TECHNIQUE:  Low dose axial images were obtained through the head.  Coronal and sagittal reformations were also performed. Contrast was not administered.    COMPARISON:  04/03/2025    FINDINGS:  There is motion artifact.    There is generalized cerebral volume loss.  There is hypoattenuation in a periventricular fashion, likely sequela of chronic microvascular ischemic change.There are a few more focal regions of low attenuation involving the bilateral corona radiata and bilateral basal ganglia, may reflect superimposed remote infarcts, stable.  There is no evidence of acute major vascular territory infarct, hemorrhage, or mass.  There is no hydrocephalus.  There are no abnormal extra-axial fluid collections.  There is a mucous retention cyst or polyp within the anterior aspect of the left maxillary sinus.  There is trace fluid within the inferior most left mastoid air cells, otherwise the visualized paranasal sinuses and mastoid air cells are  clear, and there is no evidence of calvarial fracture.  The visualized soft tissues are unremarkable.                                       CT Cervical Spine Without Contrast (Final result)  Result time 04/27/25 13:06:05      Final result by Hai Flynn MD (04/27/25 13:06:05)                   Impression:      1. Allowing for motion artifact, no convincing acute displaced fracture or dislocation of the cervical spine noting degenerative changes and additional findings above.      Electronically signed by: Hai Flynn MD  Date:    04/27/2025  Time:    13:06               Narrative:    EXAMINATION:  CT CERVICAL SPINE WITHOUT CONTRAST    CLINICAL HISTORY:  Neck trauma (Age >= 65y);    TECHNIQUE:  Low dose axial images, sagittal and coronal reformations were performed though the cervical spine.  Contrast was not administered.    COMPARISON:  None    FINDINGS:  There is osteopenia.  There is degenerative change.    The visualized portions of the lung apices are remarkable for biapical pulmonary emphysematous change noting scattered atelectasis and bronchiectasis.  The thyroid gland, parotid glands, and remaining visualized salivary glands are grossly unremarkable.  No significant tonsillar enlargement.  No significant cervical lymphadenopathy.  The posterior paraspinous and hypopharyngeal soft tissues are unremarkable.    Sagittal reformatted imaging demonstrates adequate alignment of the cervical spine without significant vertebral body height loss.  There is disc space height loss primarily involving C5-C6.  The facet joints are aligned.  No acute displaced fracture or dislocation of the cervical spine.  Motion artifact limits evaluation for spondylitic changes noting multilevel mild-to-moderate spinal canal stenosis and neuroforaminal narrowing.  The airway is patent.                                       Medications   LIDOcaine 5 % patch 1 patch (1 patch Transdermal Patch Applied 4/27/25 9456)   LIDOcaine  5 % patch 1 patch (1 patch Transdermal Patch Applied 4/27/25 1607)   diphenhydrAMINE capsule 25 mg (25 mg Oral Given 4/27/25 1858)   albuterol-ipratropium 2.5 mg-0.5 mg/3 mL nebulizer solution 3 mL (has no administration in time range)   arformoteroL nebulizer solution 15 mcg (15 mcg Nebulization Given 4/27/25 1957)   aspirin chewable tablet 81 mg (has no administration in time range)   budesonide nebulizer solution 1 mg (1 mg Nebulization Given 4/27/25 1957)   promethazine-codeine 6.25-10 mg/5 ml syrup 5 mL (has no administration in time range)   famotidine tablet 20 mg (20 mg Oral Given 4/27/25 2105)   furosemide tablet 20 mg (has no administration in time range)   latanoprost 0.005 % ophthalmic solution 1 drop (has no administration in time range)   LORazepam tablet 0.5 mg (has no administration in time range)   metoprolol succinate (TOPROL-XL) 24 hr tablet 50 mg (has no administration in time range)   oxybutynin 24 hr tablet 10 mg (has no administration in time range)   mycophenolate capsule 1,000 mg (1,000 mg Oral Given 4/27/25 2104)   nintedanib Cap 150 mg (has no administration in time range)   sacubitriL-valsartan 24-26 mg per tablet 1 tablet (1 tablet Oral Given 4/27/25 2105)   simethicone chewable tablet 80 mg (has no administration in time range)   sucralfate tablet 1 g (1 g Oral Given 4/27/25 2104)   tiZANidine tablet 2 mg (has no administration in time range)   traZODone tablet 50 mg (has no administration in time range)   sodium chloride 0.9% flush 10 mL (has no administration in time range)   melatonin tablet 6 mg (has no administration in time range)   ondansetron injection 4 mg (has no administration in time range)   polyethylene glycol packet 17 g (has no administration in time range)   naloxone 0.4 mg/mL injection 0.02 mg (has no administration in time range)   glucose chewable tablet 16 g (has no administration in time range)   glucose chewable tablet 24 g (has no administration in time range)    dextrose 50% injection 12.5 g (has no administration in time range)   dextrose 50% injection 25 g (has no administration in time range)   glucagon (human recombinant) injection 1 mg (has no administration in time range)   insulin aspart U-100 pen 0-5 Units (has no administration in time range)   oxyCODONE immediate release tablet 5 mg (has no administration in time range)   oxyCODONE immediate release tablet 10 mg (10 mg Oral Given 4/27/25 1937)   acetaminophen tablet 1,000 mg (1,000 mg Oral Not Given 4/27/25 1800)   morphine injection 4 mg (4 mg Intravenous Given 4/27/25 2104)   amLODIPine tablet 5 mg (has no administration in time range)   ketorolac injection 15 mg (15 mg Intramuscular Given 4/27/25 1328)   morphine injection 4 mg (4 mg Intravenous Given 4/27/25 1549)   morphine injection 4 mg (4 mg Intravenous Given 4/27/25 1734)     Medical Decision Making  Amount and/or Complexity of Data Reviewed  Independent Historian: EMS     Details: See HPI.  External Data Reviewed: notes.     Details: See HPI.  Radiology: ordered. Decision-making details documented in ED Course.    Risk  Prescription drug management.            Scribe Attestation:   Scribe #1: I performed the above scribed service and the documentation accurately describes the services I performed. I attest to the accuracy of the note.    Attending Attestation:     Physician Attestation Statement for NP/PA:   I personally made/approved the management plan and take responsibility for the patient management.    Other NP/PA Attestation Additions:      Medical Decision Making: Patient is on home hospice for chronic hypoxic respiratory failure due to idiopathic pulmonary fibrosis.  Patient fell.  Sustained multiple rib fractures.  Specifically fractures of the 3rd 4th 5th 6th 7th and 8th ribs laterally and 5th rib posteriorly.  Does not meet definition of flail chest.  However there is concern for worsening hypoxic respiratory failure given multiple rib  fractures especially over the next 24 hours if pulmonary contusion develops.  Advanced discussion with patient and family regarding wishes in treatment goals.  Will observe in the hospital overnight for worsening respiratory failure.                                    Clinical Impression:  Final diagnoses:  [W19.XXXA] Fall  [S22.41XA] Closed fracture of multiple ribs of right side, initial encounter (Primary)  [S22.49XA] Multiple rib fractures  [J84.9] Interstitial lung disease  [R07.9] Chest pain          ED Disposition Condition    Observation         I, Daphne Garnica, personally performed the services described in this documentation. All medical record entries made by the scribe were at my direction and in my presence. I have reviewed the chart and agree that the record reflects my personal performance and is accurate and complete.      DISCLAIMER: This note was prepared with [employer] voice recognition transcription software. Garbled syntax, mangled pronouns, and other bizarre constructions may be attributed to that software system.              [1]   Social History  Tobacco Use    Smoking status: Never     Passive exposure: Never    Smokeless tobacco: Never   Substance Use Topics    Alcohol use: No    Drug use: Never        Clemente Kidd MD  04/27/25 212

## 2025-04-27 NOTE — ED NOTES
Pt arrived to the ED via EMS. Pt. Reports that about 4 am this morning he was getting out of the bed to use the restroom and lost his balance and fell. Pt. Reports he has right shoulder, right arm and right rib area pain. Pt. Is currently on hospice at home and uses nasal canula at 3L

## 2025-04-27 NOTE — LETTER
May 6, 2025         Dougie MORGAN  [facility]  [place] [address]  Phone: [phone]       Patient: Emmanuel Grant   YOB: 1950  Date of Visit: 05/06/2025    To Whom It May Concern:    Sebastian Grant  was at [facility] in critical care on 05/06/2025. The patient was overseen by the Critical Care medicine staff, and unfortunately the patient passed away. If you have any questions or concerns, or if I can be of further assistance, please do not hesitate to contact me.    Sincerely,    Anna Hassan RN

## 2025-04-28 NOTE — SUBJECTIVE & OBJECTIVE
Interval History: Very anxious on exam. C/o severe pain to the R anterior chest and R arm. Plan to continue IV analgesics.    Review of Systems   Constitutional:  Negative for activity change, appetite change, chills, fatigue and fever.   Respiratory:  Positive for shortness of breath. Negative for cough.    Cardiovascular:  Negative for chest pain.   Gastrointestinal:  Negative for nausea and vomiting.   Musculoskeletal:  Positive for arthralgias.   Neurological:  Negative for dizziness, syncope, weakness and numbness.     Objective:     Vital Signs (Most Recent):  Temp: 98 °F (36.7 °C) (04/28/25 0510)  Pulse: 96 (04/28/25 1000)  Resp: (!) 22 (04/28/25 1007)  BP: (!) 139/100 (04/28/25 0900)  SpO2: 95 % (04/28/25 1000) Vital Signs (24h Range):  Temp:  [97.5 °F (36.4 °C)-98.2 °F (36.8 °C)] 98 °F (36.7 °C)  Pulse:  [] 96  Resp:  [14-28] 22  SpO2:  [87 %-99 %] 95 %  BP: (132-175)/() 139/100     Weight: 63.5 kg (140 lb)  Body mass index is 21.29 kg/m².    Intake/Output Summary (Last 24 hours) at 4/28/2025 1011  Last data filed at 4/27/2025 2328  Gross per 24 hour   Intake --   Output 400 ml   Net -400 ml         Physical Exam  Vitals and nursing note reviewed.   HENT:      Head: Normocephalic.      Mouth/Throat:      Pharynx: Oropharynx is clear.   Cardiovascular:      Rate and Rhythm: Normal rate and regular rhythm.   Pulmonary:      Effort: No respiratory distress.   Skin:     General: Skin is warm and dry.   Neurological:      General: No focal deficit present.      Mental Status: He is alert. Mental status is at baseline.   Psychiatric:         Mood and Affect: Mood is anxious.               Significant Labs: All pertinent labs within the past 24 hours have been reviewed.    Significant Imaging: I have reviewed all pertinent imaging results/findings within the past 24 hours.

## 2025-04-28 NOTE — ASSESSMENT & PLAN NOTE
- due to mechanical fall at home  - pain control with lidocaine patch, scheduled, tylenol, prn oral oxycodone with iv morphine for breakthrough  - hopefully can discharge tomorrow back to home hospice if pain is well controlled on oral meds

## 2025-04-28 NOTE — ASSESSMENT & PLAN NOTE
Patient's blood pressure range in the last 24 hours was: BP  Min: 132/96  Max: 175/106.The patient's inpatient anti-hypertensive regimen is listed below:  Current Antihypertensives  furosemide tablet 20 mg, Daily, Oral  metoprolol succinate (TOPROL-XL) 24 hr tablet 50 mg, Daily, Oral  amLODIPine tablet 5 mg, Daily, Oral    Plan  - BP is controlled, no changes needed to their regimen

## 2025-04-28 NOTE — NURSING
[facility] [place]  Nurses Note -- 4 Eyes      4/28/2025       Skin assessed on: Q Shift      [x] No Pressure Injuries Present    [x]Prevention Measures Documented  Scab noted to left shin    [] Yes LDA  for Pressure Injury Previously documented     [] Yes New Pressure Injury Discovered   [] LDA for New Pressure Injury Added      Attending RN:  Lourdes Calderon RN     Second eyes: MARKOS Serrato

## 2025-04-28 NOTE — PLAN OF CARE
TANNER spoke with Peyton @ [facility] to advise that patient is now Inpatient status.  Peyton will advise staff and followup with patient.    Call back received from patient's son,  Juanita, who confirmed that he was aware of patient's admit.      Case Management will continue to follow and assist with discharge planning.

## 2025-04-28 NOTE — PLAN OF CARE
Messaged sent to [facility] via [employer] to advise that patient was placed in OBS due to need for pain control following fall and multiple rib fractures.

## 2025-04-28 NOTE — ED NOTES
PRN medication for patient pulled by KANDI Mcallister and myself. Patient much improved with repositioning. Patient sitting on stretcher in high fowlers. Respirations within normal range for patient due to disease process. Patient appears to be more comfortable. Stating he doesn't have much pain. Medication wasted in pyxis with KANDI Mcallister. Patient call light in reach bed in lowest position. Safety sitter camera at bedside.

## 2025-04-28 NOTE — PLAN OF CARE
[place] - Emergency Dept  Initial Discharge Assessment       Primary Care Provider: Wilfredo De Souza MD  Case Management Assessment     PCP: See above  Pharmacy:   [id number]  [address] AT [address] [address]  Phone: [phone] Fax: [phone]    [facility] [address]  [address]   [facility] [address]  Phone: [phone] Fax: [phone]        Patient Arrived From: Home alone  Existing Help at Home: Limited Help at home.    Has a brother who lives in the area.      Barriers to Discharge: limited assistance at home    Discharge Plan:    A. hospice ([facility])   B. Home Health            Admission Diagnosis: Multiple rib fractures [S22.49XA]  Closed fracture of multiple ribs of right side, initial encounter [S22.41XA]    Admission Date: 4/27/2025  Expected Discharge Date:     Transition of Care Barriers: Other (see comments)    Payor: HU[employer] MANAGED MEDICARE / Plan: HU[employer] MEDICARE HMO / Product Type: Capitation /     Extended Emergency Contact Information  Primary Emergency Contact: Emmanuel Grant Jr   [place] [facility]  Mobile Phone: [phone]  Relation: Son  Secondary Emergency Contact: Yimi Grant  Mobile Phone: [phone]  Relation: None    Discharge Plan A: Hospice/home (Readmit to Hospice)  Discharge Plan B: Home Health      [id number]  [address] [address]  Phone: [phone] Fax: [phone]    [facility] [address] [facility]  [address]   [facility] [address]  Phone: [phone] Fax: [phone]      Initial Assessment (most recent)       Adult Discharge Assessment - 04/28/25 1522          Discharge Assessment    Assessment Type Discharge Planning Assessment     Confirmed/corrected address, phone number and insurance Yes     Confirmed Demographics Correct  on Facesheet     Source of Information health record   Assessment completed from health record.  Patient unable to assist due to excessive pain.    When was your last doctors appointment? 04/21/25     Communicated DERIC with patient/caregiver Date not available/Unable to determine     Reason For Admission Multiple Rib Fractures     People in Home alone     Facility Arrived From: Home Alone     Do you expect to return to your current living situation? Yes     Do you have help at home or someone to help you manage your care at home? --   Limited    Prior to hospitilization cognitive status: Unable to Assess     Current cognitive status: Alert/Oriented     Walking or Climbing Stairs Difficulty no     Dressing/Bathing Difficulty no     Equipment Currently Used at Home glucometer;oxygen     Readmission within 30 days? Yes     Patient currently being followed by outpatient case management? Yes     If yes, name of outpatient case management following: [facility]     Do you currently have service(s) that help you manage your care at home? Yes     Name and Contact number of agency [facility]     Is the pt/caregiver preference to resume services with current agency Yes     Do you take prescription medications? Yes     Do you have prescription coverage? Yes     Coverage Payor: HUMA[employer] MANAGED MEDICARE - HU[employer]NA MEDICARE HMO - CAPITATED     Do you have any problems affording any of your prescribed medications? No     Who is going to help you get home at discharge? TBD     How do you get to doctors appointments? car, drives self     Are you on dialysis? No     Do you take coumadin? No     Discharge Plan A Hospice/home   Readmit to Hospice    Discharge Plan B Home Health     DME Needed Upon Discharge  none     Discharge Plan discussed with: Adult children     Transition of Care Barriers Other (see comments)

## 2025-04-28 NOTE — ED NOTES
Assumed patient care for lunch relief of primary nurse. Patient sitting in stretcher, high fowlers, respirations shallow and rapid, HR elevated. Patient repositioned with pillows and educated with slowing his breathing, using his oxygen (nasal cannula). PRN orders changed for pain medications.

## 2025-04-28 NOTE — ASSESSMENT & PLAN NOTE
Patient has Systolic (HFrEF) heart failure that is Chronic. On presentation their CHF was well compensated.   Latest ECHO  Results for orders placed during the hospital encounter of 04/03/25    Echo    Interpretation Summary    Left Ventricle: The left ventricle is normal in size. Normal wall thickness. Mild global hypokinesis present. There is mildly reduced systolic function with a visually estimated ejection fraction of 45 - 50%.    Right Ventricle: The right ventricle is normal in size. Systolic function is normal.    Aorta: Aortic root is mildly dilated measuring 3.82 cm.    Similar findings noted on prior report dated 3/17/25.    Current Heart Failure Medications  furosemide tablet 20 mg, Daily, Oral  metoprolol succinate (TOPROL-XL) 24 hr tablet 50 mg, Daily, Oral  sacubitriL-valsartan 24-26 mg per tablet 1 tablet, 2 times daily, Oral    Plan  - Place on telemetry  - Low sodium diet

## 2025-04-28 NOTE — NURSING
Pt reported that he can't urinate. Bladder scan showed 588mL. Notified MD Wright, awaiting orders.     Straight cath ordered and implemented.   Removed 400mL

## 2025-04-28 NOTE — H&P
[facility] Medicine  History & Physical    Patient Name: Emmanuel Grant  MRN: [id number]  Patient Class: OP- Observation  Admission Date: 4/27/2025  Attending Physician: Blanquita Hunt MD   Primary Care Provider: Wilfredo De Souza MD         Patient information was obtained from patient, past medical records, and ER records.     Subjective:     Principal Problem:Rib fractures    Chief Complaint:   Chief Complaint   Patient presents with    Fall     Arrived via  EMS Unit 4 after slip and fall this morning. Denies any LOC or hitting head. Complaining of R arm and R thoracic pain r/t landing on R side. No obvious deformities or signs of distress. On hospice for pulm fibrosis. On 3L nasal cannula at home.         HPI: Emmanuel Grant is a 75 y/o male with PMHx of HTN, DM2, CVA, CHF, and pulmonary fibrosis with chronic respiratory failure on home oxygen and hospice who presented to ED with right shoulder and rib pain after a fall early this morning.  He states he got up to use the bathroom and he lost his balance.  He landed on his right side and immediately had pain.  He got himself into a chair and called EMS.  No head injury or LOC.  In the ED, he was found to have multiple right-sided rib fractures, CT chest demonstrates multiple nondisplaced and minimally displaced rib fractures are identified including fractures of the 3rd, 4th, 5th, 6th, 7th, and 8th ribs laterally and the 5th rib posteriorly. No pneumothorax and oxygen saturations are 97% on his regular home oxygen of 3L via NC.  He is being admitted for pain control.    Past Medical History:   Diagnosis Date    Acute hypoxic respiratory failure 09/23/2024    Oxygen therapy 24/7 now, continue 2 L oxygen therapy.      BPH (benign prostatic hyperplasia) 02/28/2024    CAD (coronary artery disease)     Sees [facility], h/o stent    Chronic HFrEF (heart failure with reduced ejection fraction) 05/01/2024    Diabetes mellitus     Hypertension      "Kidney stone     Stroke     age 62    Type 2 diabetes mellitus without complication, without long-term current use of insulin 10/08/2021       Past Surgical History:   Procedure Laterality Date    24 HOUR IMPEDANCE PH MONITORING OF ESOPHAGUS IN PATIENT NOT TAKING ACID REDUCING MEDICATIONS N/A 11/13/2024    Procedure: IMPEDANCE PH STUDY, ESOPHAGEAL, 24 HOUR, IN PATIENT NOT TAKING ACID REDUCING MEDICATION;  Surgeon: Stephany Mendoza MD;  Location: [facility] (4TH FLR);  Service: Endoscopy;  Laterality: N/A;  referral dr miguel/ off ppi / prep inst sent to pt via mail  11/6- precall attempted no answer. Unable to [facility]-  11/8 - pt not called, per chart review patient currently inpatient at [place] [id number]    CARDIAC CATHETERIZATION      with stent    COLONOSCOPY N/A 03/27/2024    Procedure: COLONOSCOPY;  Surgeon: Ariela Castro MD;  Location: [facility];  Service: Endoscopy;  Laterality: N/A;    ESOPHAGEAL MANOMETRY WITH MEASUREMENT OF IMPEDANCE N/A 11/13/2024    Procedure: MANOMETRY, ESOPHAGUS, WITH IMPEDANCE MEASUREMENT;  Surgeon: Stephany Mendoza MD;  Location: [facility] (4TH FLR);  Service: Endoscopy;  Laterality: N/A;    ESOPHAGOGASTRODUODENOSCOPY N/A 03/27/2024    Procedure: EGD (ESOPHAGOGASTRODUODENOSCOPY);  Surgeon: Ariela Castro MD;  Location: [facility] ENDO;  Service: Endoscopy;  Laterality: N/A;    LEFT HEART CATHETERIZATION Left 9/10/2024    Procedure: Left heart cath;  Surgeon: Jemal Drummond MD;  Location: [facility] CATH LAB;  Service: Cardiology;  Laterality: Left;    SHOULDER SURGERY Right        Review of patient's allergies indicates:   Allergen Reactions    Dapagliflozin      Other reaction(s): Other (See Comments)    Pcn [penicillins]     Linagliptin Other (See Comments)     "it knocked me down", "it almost killed me"    Lisinopril Other (See Comments)     cough    Pantoprazole Hives       No current facility-administered medications on file prior to encounter.     Current Outpatient " Medications on File Prior to Encounter   Medication Sig    albuterol-ipratropium (DUO-NEB) 2.5 mg-0.5 mg/3 mL nebulizer solution Take 3 mLs by nebulization every 4 (four) hours. Rescue    amLODIPine (NORVASC) 10 MG tablet Take 1 tablet (10 mg total) by mouth once daily.    arformoteroL (BROVANA) 15 mcg/2 mL Nebu Take 2 mLs (15 mcg total) by nebulization 2 (two) times daily. Controller    aspirin 81 MG Chew Take 81 mg by mouth once daily.    BANOPHEN 25 mg capsule Take 25 mg by mouth every 6 (six) hours as needed.    budesonide (PULMICORT) 0.5 mg/2 mL nebulizer solution Take 4 mLs (1 mg total) by nebulization 2 (two) times daily. Controller    cholecalciferol, vitamin D3, (VITAMIN D3) 50 mcg (2,000 unit) Cap capsule Take 1 capsule by mouth once daily.    codeine-guaiFENesin 7.5-225 mg/5 mL Liqd Take 5 mLs by mouth nightly as needed (cough/SOB).    famotidine (PEPCID) 20 MG tablet Take 1 tablet (20 mg total) by mouth 2 (two) times daily as needed for Heartburn.    furosemide (LASIX) 20 MG tablet Take 1 tablet (20 mg total) by mouth once daily.    glimepiride (AMARYL) 4 MG tablet Take 1 tablet (4 mg total) by mouth before breakfast.    insulin aspart U-100 (NOVOLOG FLEXPEN U-100 INSULIN) 100 unit/mL (3 mL) InPn pen Inject as needed before meals: 180-230=+1, 231-280=+2, 281-330=+3, 331-380=+4, over 380=+5 units    latanoprost, PF, 0.005 % Drop Both eyes qhs    LORazepam (ATIVAN) 0.5 MG tablet 0.5 mg every 4 (four) hours as needed.    metFORMIN (GLUCOPHAGE-XR) 500 MG ER 24hr tablet Take 1 tablet (500 mg total) by mouth 2 (two) times daily with meals.    metoprolol succinate (TOPROL-XL) 50 MG 24 hr tablet Take 1 tablet (50 mg total) by mouth once daily.    mirabegron (MYRBETRIQ) 25 mg Tb24 ER tablet Take 1 tablet (25 mg total) by mouth once daily.    morphine 100 mg/5 mL (20 mg/mL) concentrated solution     mycophenolate (CELLCEPT) 250 mg Cap Take 4 capsules (1,000 mg total) by mouth 2 (two) times daily.    nintedanib  "(OFEV) 150 mg Cap Take 1 capsule (150 mg total) by mouth 2 (two) times a day.    rosuvastatin (CRESTOR) 20 MG tablet Take 1 tablet (20 mg total) by mouth once daily.    sacubitriL-valsartan (ENTRESTO) 24-26 mg per tablet Take 1 tablet by mouth 2 (two) times daily.    simethicone (MYLICON) 80 MG chewable tablet Take 1 tablet (80 mg total) by mouth 3 (three) times daily as needed for Flatulence.    sucralfate (CARAFATE) 1 gram tablet Take 1 tablet (1 g total) by mouth 4 (four) times daily before meals and nightly.    tiZANidine (ZANAFLEX) 2 MG tablet Take 2 mg by mouth every 8 (eight) hours as needed.    traZODone (DESYREL) 50 MG tablet Take 50 mg by mouth nightly as needed.    [DISCONTINUED] carvediloL (COREG) 25 MG tablet 0 tablet    FREESTYLE RAMBO 2 SENSOR Kit     nitroGLYCERIN (NITROSTAT) 0.3 MG SL tablet Place 1 tablet (0.3 mg total) under the tongue every 5 (five) minutes as needed for Chest pain.    omeprazole (PRILOSEC) 40 MG capsule Take 1 capsule (40 mg total) by mouth once daily.    ONETOUCH DELICA PLUS LANCET 33 gauge Misc Apply topically 3 (three) times daily.    pen needle, diabetic 32 gauge x 5/32" Ndle 1 each by Misc.(Non-Drug; Combo Route) route once daily.    [DISCONTINUED] atorvastatin (LIPITOR) 20 MG tablet 90    [DISCONTINUED] glipiZIDE (GLUCOTROL) 10 MG tablet 180     Family History       Problem Relation (Age of Onset)    COPD Brother    Cancer Mother    Colon cancer Sister    Heart disease Sister          Tobacco Use    Smoking status: Never     Passive exposure: Never    Smokeless tobacco: Never   Substance and Sexual Activity    Alcohol use: No    Drug use: Never    Sexual activity: Not Currently     Review of Systems   Constitutional:  Negative for appetite change, chills and fever.   Respiratory:  Positive for shortness of breath (baseline). Negative for wheezing.    Cardiovascular:  Positive for chest pain (right rib pain). Negative for palpitations and leg swelling.   Gastrointestinal:  " Negative for abdominal pain, constipation, diarrhea, nausea and vomiting.   Neurological:  Negative for dizziness and light-headedness.     Objective:     Vital Signs (Most Recent):  Temp: 97.5 °F (36.4 °C) (04/27/25 1130)  Pulse: 79 (04/27/25 1900)  Resp: 20 (04/27/25 1734)  BP: (!) 153/87 (04/27/25 1611)  SpO2: 97 % (04/27/25 1900) Vital Signs (24h Range):  Temp:  [97.5 °F (36.4 °C)] 97.5 °F (36.4 °C)  Pulse:  [72-80] 79  Resp:  [18-20] 20  SpO2:  [97 %] 97 %  BP: (153-154)/(87-92) 153/87     Weight: 63.5 kg (140 lb)  Body mass index is 21.29 kg/m².     Physical Exam  Vitals and nursing note reviewed.   Constitutional:       General: He is not in acute distress.     Appearance: Normal appearance. He is ill-appearing (chronically).   HENT:      Head: Normocephalic and atraumatic.      Mouth/Throat:      Mouth: Mucous membranes are moist.      Pharynx: Oropharynx is clear.   Eyes:      Conjunctiva/sclera: Conjunctivae normal.   Cardiovascular:      Rate and Rhythm: Normal rate and regular rhythm.      Pulses: Normal pulses.      Heart sounds: Normal heart sounds. No murmur heard.  Pulmonary:      Effort: Pulmonary effort is normal. No respiratory distress.      Breath sounds: Normal breath sounds. No wheezing or rales.   Chest:      Chest wall: Tenderness (right chest wall) present.   Abdominal:      General: Bowel sounds are normal. There is no distension.      Palpations: Abdomen is soft.      Tenderness: There is no abdominal tenderness.   Musculoskeletal:         General: Normal range of motion.      Right lower leg: No edema.      Left lower leg: No edema.   Skin:     General: Skin is warm and dry.   Neurological:      General: No focal deficit present.      Mental Status: He is alert and oriented to person, place, and time. Mental status is at baseline.   Psychiatric:         Behavior: Behavior normal.         Thought Content: Thought content normal.                Significant Labs: All pertinent labs within  the past 24 hours have been reviewed.      Significant Imaging: I have reviewed all pertinent imaging results/findings within the past 24 hours.    CT Chest Without Contrast [[id number]] Resulted: 04/27/25 1544   Order Status: Completed Updated: 04/27/25 1546   Narrative:     EXAMINATION:  CT CHEST WITHOUT CONTRAST    CLINICAL HISTORY:  multiple rib fractures;    TECHNIQUE:  Low dose axial images, sagittal and coronal reformations were obtained from the thoracic inlet to the lung bases. Contrast was not administered.    COMPARISON:  CTA of the chest dated 04/03/2025.    FINDINGS:  Soft tissue structures at the base of the neck appear unremarkable.  The heart is not enlarged.  There is atherosclerotic calcification identified within the coronary arteries in a multi-vessel distribution.  There is a coronary arterial stent present.  The thoracic aorta is normal in caliber without aneurysmal dilatation.  No hilar or mediastinal adenopathy is identified.  No axillary adenopathy is identified.  Once again, there are findings of chronic interstitial lung disease in a UIP type pattern with peripheral honeycombing with lower lung predominance with traction bronchiolectasis.  When allowing for differences in technique, findings are similar to the prior study dated 04/03/2025.  There is no evidence for pneumothorax or pleural effusions.  Multiple nondisplaced and minimally displaced rib fractures are identified including fractures of the 3rd, 4th, 5th, 6th, 7th, and 8th ribs laterally and the 5th rib posteriorly.  Visualized upper abdomen demonstrates cyst within the upper pole of the right kidney and a nonobstructing calculus within the mid to lower right kidney, incompletely included on this examination.   Impression:       Multiple nondisplaced and minimally displaced right rib fractures.  No evidence for pneumothorax or pleural effusions.    Findings consistent with chronic interstitial lung disease in a UIP type  pattern.    Coronary artery atherosclerosis in a multi vessel distribution.    Right renal cysts and nonobstructing right renal calculus.      Electronically signed by: Jayson Sanchez MD  Date: 04/27/2025  Time: 15:44   X-ray Shoulder 2 or More Views Right [[id number]] (Abnormal) Resulted: 04/27/25 1359   Order Status: Completed Updated: 04/27/25 1402   Narrative:     EXAMINATION:  XR SHOULDER COMPLETE 2 OR MORE VIEWS RIGHT    CLINICAL HISTORY:  Fall;Unspecified fall, initial encounter    TECHNIQUE:  Two or three views of the right shoulder were performed.    COMPARISON:  None    FINDINGS:  Two views right shoulder.    The right humeral head maintains appropriate relationship with the glenoid.  The acromioclavicular joint is intact.  There are fractures involving the posterolateral aspects of right ribs 3, 4, 6, 7, and 8.  There are fractures of right rib 5 in 2 places.  There is coarse interstitial attenuation throughout the parenchyma, may reflect edema.   Impression:       This report was flagged in Epic as abnormal.    1. No acute displaced fracture or dislocation of the right shoulder.  2. Multiple right rib fractures as described.  Dedicated chest radiograph is recommended for definitive exclusion of pneumothorax.  Please see separately dictated rib series 04/27/2025 for more definitive evaluation of the rib injuries.      Electronically signed by: Hai Flynn MD  Date: 04/27/2025  Time: 13:59   X-Ray Ribs 2 View Right [[id number]] Resulted: 04/27/25 1352   Order Status: Completed Updated: 04/27/25 1354   Narrative:     EXAMINATION:  XR RIBS 2 VIEW RIGHT    CLINICAL HISTORY:  Unspecified fall, initial encounter    TECHNIQUE:  Two views of the right ribs were performed.    COMPARISON:  None    FINDINGS:  There are multiple acute fractures identified involving at least the lateral aspects of the right 3rd, 4th, 5th, 6th, 7th, and 8th ribs.  There also appears to be a fracture involving the posterior aspect of  the 5th rib.  There is no evidence for underlying pneumothorax or pleural effusion on the right.  There are prominent interstitial changes noted within the right lung in this patient with history of pulmonary fibrosis.   Impression:       Multiple acute rib fractures involving at least the right 3rd, 4th, 5th, 6, 7th, and 8th ribs laterally as well as the posterior aspect of the 5th rib.    No evidence for pneumothorax or large pleural effusion on the right.  Diffuse interstitial changes consistent with patient's history of pulmonary fibrosis.      Electronically signed by: Jayson Sanchez MD  Date: 04/27/2025  Time: 13:52   CT Cervical Spine Without Contrast [[id number]] Resulted: 04/27/25 1306   Order Status: Completed Updated: 04/27/25 1308   Narrative:     EXAMINATION:  CT CERVICAL SPINE WITHOUT CONTRAST    CLINICAL HISTORY:  Neck trauma (Age >= 65y);    TECHNIQUE:  Low dose axial images, sagittal and coronal reformations were performed though the cervical spine.  Contrast was not administered.    COMPARISON:  None    FINDINGS:  There is osteopenia.  There is degenerative change.    The visualized portions of the lung apices are remarkable for biapical pulmonary emphysematous change noting scattered atelectasis and bronchiectasis.  The thyroid gland, parotid glands, and remaining visualized salivary glands are grossly unremarkable.  No significant tonsillar enlargement.  No significant cervical lymphadenopathy.  The posterior paraspinous and hypopharyngeal soft tissues are unremarkable.    Sagittal reformatted imaging demonstrates adequate alignment of the cervical spine without significant vertebral body height loss.  There is disc space height loss primarily involving C5-C6.  The facet joints are aligned.  No acute displaced fracture or dislocation of the cervical spine.  Motion artifact limits evaluation for spondylitic changes noting multilevel mild-to-moderate spinal canal stenosis and neuroforaminal  narrowing.  The airway is patent.   Impression:       1. Allowing for motion artifact, no convincing acute displaced fracture or dislocation of the cervical spine noting degenerative changes and additional findings above.      Electronically signed by: Hai Flynn MD  Date: 04/27/2025  Time: 13:06   CT Head Without Contrast [[id number]] Resulted: 04/27/25 1301   Order Status: Completed Updated: 04/27/25 1303   Narrative:     EXAMINATION:  CT HEAD WITHOUT CONTRAST    CLINICAL HISTORY:  Head trauma, minor (Age >= 65y);    TECHNIQUE:  Low dose axial images were obtained through the head.  Coronal and sagittal reformations were also performed. Contrast was not administered.    COMPARISON:  04/03/2025    FINDINGS:  There is motion artifact.    There is generalized cerebral volume loss.  There is hypoattenuation in a periventricular fashion, likely sequela of chronic microvascular ischemic change.There are a few more focal regions of low attenuation involving the bilateral corona radiata and bilateral basal ganglia, may reflect superimposed remote infarcts, stable.  There is no evidence of acute major vascular territory infarct, hemorrhage, or mass.  There is no hydrocephalus.  There are no abnormal extra-axial fluid collections.  There is a mucous retention cyst or polyp within the anterior aspect of the left maxillary sinus.  There is trace fluid within the inferior most left mastoid air cells, otherwise the visualized paranasal sinuses and mastoid air cells are clear, and there is no evidence of calvarial fracture.  The visualized soft tissues are unremarkable.   Impression:       1. Allowing for motion artifact, no convincing acute intracranial abnormalities noting sequela of chronic microvascular ischemic change, senescent change, and remote lacunar infarcts.  2. Sinus disease.      Electronically signed by: Hai Flynn MD  Date: 04/27/2025  Time: 13:01     Assessment/Plan:     Assessment & Plan  Rib  fractures  - due to mechanical fall at home  - pain control with lidocaine patch, scheduled, tylenol, prn oral oxycodone with iv morphine for breakthrough  - hopefully can discharge tomorrow back to home hospice if pain is well controlled on oral meds  Interstitial lung disease  - appears at baseline  - continue home oxygen and home medications/inhalers    Chronic hypoxemic respiratory failure  - due to interstitial lung disease  - currently maintaining O2 sats on his home oxygen 3L via NC  Chronic systolic congestive heart failure  Patient has Systolic (HFrEF) heart failure that is Chronic. On presentation their CHF was well compensated.   Latest ECHO  Results for orders placed during the hospital encounter of 04/03/25    Echo    Interpretation Summary    Left Ventricle: The left ventricle is normal in size. Normal wall thickness. Mild global hypokinesis present. There is mildly reduced systolic function with a visually estimated ejection fraction of 45 - 50%.    Right Ventricle: The right ventricle is normal in size. Systolic function is normal.    Aorta: Aortic root is mildly dilated measuring 3.82 cm.    Similar findings noted on prior report dated 3/17/25.    Current Heart Failure Medications  furosemide tablet 20 mg, Daily, Oral  metoprolol succinate (TOPROL-XL) 24 hr tablet 50 mg, Daily, Oral  sacubitriL-valsartan 24-26 mg per tablet 1 tablet, 2 times daily, Oral    Plan  - Place on telemetry  - Low sodium diet        Type 2 diabetes mellitus without complication, without long-term current use of insulin  - glucose checks with low dose SSI  - hold home oral meds    Essential hypertension  Patient's blood pressure range in the last 24 hours was: BP  Min: 153/87  Max: 154/92.The patient's inpatient anti-hypertensive regimen is listed below:  Current Antihypertensives  furosemide tablet 20 mg, Daily, Oral  metoprolol succinate (TOPROL-XL) 24 hr tablet 50 mg, Daily, Oral  amLODIPine tablet 5 mg, Daily,  Oral    Plan  - BP is controlled, no changes needed to their regimen  VTE Risk Mitigation (From admission, onward)           Ordered     Reason for No Pharmacological VTE Prophylaxis  Once        Question:  Reasons:  Answer:  Risk of Bleeding    04/27/25 1755     IP VTE HIGH RISK PATIENT  Once         04/27/25 1755     Place sequential compression device  Until discontinued         04/27/25 1755                         On 04/27/2025, patient should be placed in hospital observation services under my care in collaboration with Dr. Han .           VASHTI WayneC  Department of Hospital Medicine

## 2025-04-28 NOTE — SUBJECTIVE & OBJECTIVE
Past Medical History:   Diagnosis Date    Acute hypoxic respiratory failure 09/23/2024    Oxygen therapy 24/7 now, continue 2 L oxygen therapy.      BPH (benign prostatic hyperplasia) 02/28/2024    CAD (coronary artery disease)     Sees [facility], h/o stent    Chronic HFrEF (heart failure with reduced ejection fraction) 05/01/2024    Diabetes mellitus     Hypertension     Kidney stone     Stroke     age 62    Type 2 diabetes mellitus without complication, without long-term current use of insulin 10/08/2021       Past Surgical History:   Procedure Laterality Date    24 HOUR IMPEDANCE PH MONITORING OF ESOPHAGUS IN PATIENT NOT TAKING ACID REDUCING MEDICATIONS N/A 11/13/2024    Procedure: IMPEDANCE PH STUDY, ESOPHAGEAL, 24 HOUR, IN PATIENT NOT TAKING ACID REDUCING MEDICATION;  Surgeon: Stephany Mendoza MD;  Location: [facility] (4TH FLR);  Service: Endoscopy;  Laterality: N/A;  referral dr miguel/ off ppi / prep inst sent to pt via mail  11/6- precall attempted no answer. Unable to [facility]-  11/8 - pt not called, per chart review patient currently inpatient at [place] [id number]    CARDIAC CATHETERIZATION      with stent    COLONOSCOPY N/A 03/27/2024    Procedure: COLONOSCOPY;  Surgeon: Ariela Castro MD;  Location: [facility];  Service: Endoscopy;  Laterality: N/A;    ESOPHAGEAL MANOMETRY WITH MEASUREMENT OF IMPEDANCE N/A 11/13/2024    Procedure: MANOMETRY, ESOPHAGUS, WITH IMPEDANCE MEASUREMENT;  Surgeon: Stephany Mendoza MD;  Location: [facility] (4TH FLR);  Service: Endoscopy;  Laterality: N/A;    ESOPHAGOGASTRODUODENOSCOPY N/A 03/27/2024    Procedure: EGD (ESOPHAGOGASTRODUODENOSCOPY);  Surgeon: Ariela Castro MD;  Location: [facility] ENDO;  Service: Endoscopy;  Laterality: N/A;    LEFT HEART CATHETERIZATION Left 9/10/2024    Procedure: Left heart cath;  Surgeon: Jemal Drummond MD;  Location: [facility] CATH LAB;  Service: Cardiology;  Laterality: Left;    SHOULDER SURGERY Right        Review of patient's allergies  "indicates:   Allergen Reactions    Dapagliflozin      Other reaction(s): Other (See Comments)    Pcn [penicillins]     Linagliptin Other (See Comments)     "it knocked me down", "it almost killed me"    Lisinopril Other (See Comments)     cough    Pantoprazole Hives       No current facility-administered medications on file prior to encounter.     Current Outpatient Medications on File Prior to Encounter   Medication Sig    albuterol-ipratropium (DUO-NEB) 2.5 mg-0.5 mg/3 mL nebulizer solution Take 3 mLs by nebulization every 4 (four) hours. Rescue    amLODIPine (NORVASC) 10 MG tablet Take 1 tablet (10 mg total) by mouth once daily.    arformoteroL (BROVANA) 15 mcg/2 mL Nebu Take 2 mLs (15 mcg total) by nebulization 2 (two) times daily. Controller    aspirin 81 MG Chew Take 81 mg by mouth once daily.    BANOPHEN 25 mg capsule Take 25 mg by mouth every 6 (six) hours as needed.    budesonide (PULMICORT) 0.5 mg/2 mL nebulizer solution Take 4 mLs (1 mg total) by nebulization 2 (two) times daily. Controller    cholecalciferol, vitamin D3, (VITAMIN D3) 50 mcg (2,000 unit) Cap capsule Take 1 capsule by mouth once daily.    codeine-guaiFENesin 7.5-225 mg/5 mL Liqd Take 5 mLs by mouth nightly as needed (cough/SOB).    famotidine (PEPCID) 20 MG tablet Take 1 tablet (20 mg total) by mouth 2 (two) times daily as needed for Heartburn.    furosemide (LASIX) 20 MG tablet Take 1 tablet (20 mg total) by mouth once daily.    glimepiride (AMARYL) 4 MG tablet Take 1 tablet (4 mg total) by mouth before breakfast.    insulin aspart U-100 (NOVOLOG FLEXPEN U-100 INSULIN) 100 unit/mL (3 mL) InPn pen Inject as needed before meals: 180-230=+1, 231-280=+2, 281-330=+3, 331-380=+4, over 380=+5 units    latanoprost, PF, 0.005 % Drop Both eyes qhs    LORazepam (ATIVAN) 0.5 MG tablet 0.5 mg every 4 (four) hours as needed.    metFORMIN (GLUCOPHAGE-XR) 500 MG ER 24hr tablet Take 1 tablet (500 mg total) by mouth 2 (two) times daily with meals.    " "metoprolol succinate (TOPROL-XL) 50 MG 24 hr tablet Take 1 tablet (50 mg total) by mouth once daily.    mirabegron (MYRBETRIQ) 25 mg Tb24 ER tablet Take 1 tablet (25 mg total) by mouth once daily.    morphine 100 mg/5 mL (20 mg/mL) concentrated solution     mycophenolate (CELLCEPT) 250 mg Cap Take 4 capsules (1,000 mg total) by mouth 2 (two) times daily.    nintedanib (OFEV) 150 mg Cap Take 1 capsule (150 mg total) by mouth 2 (two) times a day.    rosuvastatin (CRESTOR) 20 MG tablet Take 1 tablet (20 mg total) by mouth once daily.    sacubitriL-valsartan (ENTRESTO) 24-26 mg per tablet Take 1 tablet by mouth 2 (two) times daily.    simethicone (MYLICON) 80 MG chewable tablet Take 1 tablet (80 mg total) by mouth 3 (three) times daily as needed for Flatulence.    sucralfate (CARAFATE) 1 gram tablet Take 1 tablet (1 g total) by mouth 4 (four) times daily before meals and nightly.    tiZANidine (ZANAFLEX) 2 MG tablet Take 2 mg by mouth every 8 (eight) hours as needed.    traZODone (DESYREL) 50 MG tablet Take 50 mg by mouth nightly as needed.    [DISCONTINUED] carvediloL (COREG) 25 MG tablet 0 tablet    FREESTYLE RAMBO 2 SENSOR Kit     nitroGLYCERIN (NITROSTAT) 0.3 MG SL tablet Place 1 tablet (0.3 mg total) under the tongue every 5 (five) minutes as needed for Chest pain.    omeprazole (PRILOSEC) 40 MG capsule Take 1 capsule (40 mg total) by mouth once daily.    ONETOUCH DELICA PLUS LANCET 33 gauge Misc Apply topically 3 (three) times daily.    pen needle, diabetic 32 gauge x 5/32" Ndle 1 each by Misc.(Non-Drug; Combo Route) route once daily.    [DISCONTINUED] atorvastatin (LIPITOR) 20 MG tablet 90    [DISCONTINUED] glipiZIDE (GLUCOTROL) 10 MG tablet 180     Family History       Problem Relation (Age of Onset)    COPD Brother    Cancer Mother    Colon cancer Sister    Heart disease Sister          Tobacco Use    Smoking status: Never     Passive exposure: Never    Smokeless tobacco: Never   Substance and Sexual Activity "    Alcohol use: No    Drug use: Never    Sexual activity: Not Currently     Review of Systems   Constitutional:  Negative for appetite change, chills and fever.   Respiratory:  Positive for shortness of breath (baseline). Negative for wheezing.    Cardiovascular:  Positive for chest pain (right rib pain). Negative for palpitations and leg swelling.   Gastrointestinal:  Negative for abdominal pain, constipation, diarrhea, nausea and vomiting.   Neurological:  Negative for dizziness and light-headedness.     Objective:     Vital Signs (Most Recent):  Temp: 97.5 °F (36.4 °C) (04/27/25 1130)  Pulse: 79 (04/27/25 1900)  Resp: 20 (04/27/25 1734)  BP: (!) 153/87 (04/27/25 1611)  SpO2: 97 % (04/27/25 1900) Vital Signs (24h Range):  Temp:  [97.5 °F (36.4 °C)] 97.5 °F (36.4 °C)  Pulse:  [72-80] 79  Resp:  [18-20] 20  SpO2:  [97 %] 97 %  BP: (153-154)/(87-92) 153/87     Weight: 63.5 kg (140 lb)  Body mass index is 21.29 kg/m².     Physical Exam  Vitals and nursing note reviewed.   Constitutional:       General: He is not in acute distress.     Appearance: Normal appearance. He is ill-appearing (chronically).   HENT:      Head: Normocephalic and atraumatic.      Mouth/Throat:      Mouth: Mucous membranes are moist.      Pharynx: Oropharynx is clear.   Eyes:      Conjunctiva/sclera: Conjunctivae normal.   Cardiovascular:      Rate and Rhythm: Normal rate and regular rhythm.      Pulses: Normal pulses.      Heart sounds: Normal heart sounds. No murmur heard.  Pulmonary:      Effort: Pulmonary effort is normal. No respiratory distress.      Breath sounds: Normal breath sounds. No wheezing or rales.   Chest:      Chest wall: Tenderness (right chest wall) present.   Abdominal:      General: Bowel sounds are normal. There is no distension.      Palpations: Abdomen is soft.      Tenderness: There is no abdominal tenderness.   Musculoskeletal:         General: Normal range of motion.      Right lower leg: No edema.      Left lower leg:  No edema.   Skin:     General: Skin is warm and dry.   Neurological:      General: No focal deficit present.      Mental Status: He is alert and oriented to person, place, and time. Mental status is at baseline.   Psychiatric:         Behavior: Behavior normal.         Thought Content: Thought content normal.                Significant Labs: All pertinent labs within the past 24 hours have been reviewed.      Significant Imaging: I have reviewed all pertinent imaging results/findings within the past 24 hours.    CT Chest Without Contrast [[id number]] Resulted: 04/27/25 1544   Order Status: Completed Updated: 04/27/25 1546   Narrative:     EXAMINATION:  CT CHEST WITHOUT CONTRAST    CLINICAL HISTORY:  multiple rib fractures;    TECHNIQUE:  Low dose axial images, sagittal and coronal reformations were obtained from the thoracic inlet to the lung bases. Contrast was not administered.    COMPARISON:  CTA of the chest dated 04/03/2025.    FINDINGS:  Soft tissue structures at the base of the neck appear unremarkable.  The heart is not enlarged.  There is atherosclerotic calcification identified within the coronary arteries in a multi-vessel distribution.  There is a coronary arterial stent present.  The thoracic aorta is normal in caliber without aneurysmal dilatation.  No hilar or mediastinal adenopathy is identified.  No axillary adenopathy is identified.  Once again, there are findings of chronic interstitial lung disease in a UIP type pattern with peripheral honeycombing with lower lung predominance with traction bronchiolectasis.  When allowing for differences in technique, findings are similar to the prior study dated 04/03/2025.  There is no evidence for pneumothorax or pleural effusions.  Multiple nondisplaced and minimally displaced rib fractures are identified including fractures of the 3rd, 4th, 5th, 6th, 7th, and 8th ribs laterally and the 5th rib posteriorly.  Visualized upper abdomen demonstrates cyst within  the upper pole of the right kidney and a nonobstructing calculus within the mid to lower right kidney, incompletely included on this examination.   Impression:       Multiple nondisplaced and minimally displaced right rib fractures.  No evidence for pneumothorax or pleural effusions.    Findings consistent with chronic interstitial lung disease in a UIP type pattern.    Coronary artery atherosclerosis in a multi vessel distribution.    Right renal cysts and nonobstructing right renal calculus.      Electronically signed by: Jayson Sanchez MD  Date: 04/27/2025  Time: 15:44   X-ray Shoulder 2 or More Views Right [[id number]] (Abnormal) Resulted: 04/27/25 1359   Order Status: Completed Updated: 04/27/25 1402   Narrative:     EXAMINATION:  XR SHOULDER COMPLETE 2 OR MORE VIEWS RIGHT    CLINICAL HISTORY:  Fall;Unspecified fall, initial encounter    TECHNIQUE:  Two or three views of the right shoulder were performed.    COMPARISON:  None    FINDINGS:  Two views right shoulder.    The right humeral head maintains appropriate relationship with the glenoid.  The acromioclavicular joint is intact.  There are fractures involving the posterolateral aspects of right ribs 3, 4, 6, 7, and 8.  There are fractures of right rib 5 in 2 places.  There is coarse interstitial attenuation throughout the parenchyma, may reflect edema.   Impression:       This report was flagged in Epic as abnormal.    1. No acute displaced fracture or dislocation of the right shoulder.  2. Multiple right rib fractures as described.  Dedicated chest radiograph is recommended for definitive exclusion of pneumothorax.  Please see separately dictated rib series 04/27/2025 for more definitive evaluation of the rib injuries.      Electronically signed by: Hai Flynn MD  Date: 04/27/2025  Time: 13:59   X-Ray Ribs 2 View Right [[id number]] Resulted: 04/27/25 1352   Order Status: Completed Updated: 04/27/25 1354   Narrative:     EXAMINATION:  XR RIBS 2 VIEW  RIGHT    CLINICAL HISTORY:  Unspecified fall, initial encounter    TECHNIQUE:  Two views of the right ribs were performed.    COMPARISON:  None    FINDINGS:  There are multiple acute fractures identified involving at least the lateral aspects of the right 3rd, 4th, 5th, 6th, 7th, and 8th ribs.  There also appears to be a fracture involving the posterior aspect of the 5th rib.  There is no evidence for underlying pneumothorax or pleural effusion on the right.  There are prominent interstitial changes noted within the right lung in this patient with history of pulmonary fibrosis.   Impression:       Multiple acute rib fractures involving at least the right 3rd, 4th, 5th, 6, 7th, and 8th ribs laterally as well as the posterior aspect of the 5th rib.    No evidence for pneumothorax or large pleural effusion on the right.  Diffuse interstitial changes consistent with patient's history of pulmonary fibrosis.      Electronically signed by: Jayson Sanchez MD  Date: 04/27/2025  Time: 13:52   CT Cervical Spine Without Contrast [[id number]] Resulted: 04/27/25 1306   Order Status: Completed Updated: 04/27/25 1308   Narrative:     EXAMINATION:  CT CERVICAL SPINE WITHOUT CONTRAST    CLINICAL HISTORY:  Neck trauma (Age >= 65y);    TECHNIQUE:  Low dose axial images, sagittal and coronal reformations were performed though the cervical spine.  Contrast was not administered.    COMPARISON:  None    FINDINGS:  There is osteopenia.  There is degenerative change.    The visualized portions of the lung apices are remarkable for biapical pulmonary emphysematous change noting scattered atelectasis and bronchiectasis.  The thyroid gland, parotid glands, and remaining visualized salivary glands are grossly unremarkable.  No significant tonsillar enlargement.  No significant cervical lymphadenopathy.  The posterior paraspinous and hypopharyngeal soft tissues are unremarkable.    Sagittal reformatted imaging demonstrates adequate alignment of  the cervical spine without significant vertebral body height loss.  There is disc space height loss primarily involving C5-C6.  The facet joints are aligned.  No acute displaced fracture or dislocation of the cervical spine.  Motion artifact limits evaluation for spondylitic changes noting multilevel mild-to-moderate spinal canal stenosis and neuroforaminal narrowing.  The airway is patent.   Impression:       1. Allowing for motion artifact, no convincing acute displaced fracture or dislocation of the cervical spine noting degenerative changes and additional findings above.      Electronically signed by: Hai Flynn MD  Date: 04/27/2025  Time: 13:06   CT Head Without Contrast [[id number]] Resulted: 04/27/25 1301   Order Status: Completed Updated: 04/27/25 1303   Narrative:     EXAMINATION:  CT HEAD WITHOUT CONTRAST    CLINICAL HISTORY:  Head trauma, minor (Age >= 65y);    TECHNIQUE:  Low dose axial images were obtained through the head.  Coronal and sagittal reformations were also performed. Contrast was not administered.    COMPARISON:  04/03/2025    FINDINGS:  There is motion artifact.    There is generalized cerebral volume loss.  There is hypoattenuation in a periventricular fashion, likely sequela of chronic microvascular ischemic change.There are a few more focal regions of low attenuation involving the bilateral corona radiata and bilateral basal ganglia, may reflect superimposed remote infarcts, stable.  There is no evidence of acute major vascular territory infarct, hemorrhage, or mass.  There is no hydrocephalus.  There are no abnormal extra-axial fluid collections.  There is a mucous retention cyst or polyp within the anterior aspect of the left maxillary sinus.  There is trace fluid within the inferior most left mastoid air cells, otherwise the visualized paranasal sinuses and mastoid air cells are clear, and there is no evidence of calvarial fracture.  The visualized soft tissues are unremarkable.    Impression:       1. Allowing for motion artifact, no convincing acute intracranial abnormalities noting sequela of chronic microvascular ischemic change, senescent change, and remote lacunar infarcts.  2. Sinus disease.      Electronically signed by: Hai Flynn MD  Date: 04/27/2025  Time: 13:01

## 2025-04-28 NOTE — PROGRESS NOTES
[facility]  [facility]  Progress Note    Patient Name: Emmanuel Grant  MRN: [id number]  Patient Class: OP- Observation   Admission Date: 4/27/2025  Length of Stay: 0 days  Attending Physician: Blanquita Hunt MD  Primary Care Provider: Wilfredo De Souza MD        Subjective     Principal Problem:Rib fractures        HPI:  Emmanuel Grant is a 75 y/o male with PMHx of HTN, DM2, CVA, CHF, and pulmonary fibrosis with chronic respiratory failure on home oxygen and hospice who presented to ED with right shoulder and rib pain after a fall early this morning.  He states he got up to use the bathroom and he lost his balance.  He landed on his right side and immediately had pain.  He got himself into a chair and called EMS.  No head injury or LOC.  In the ED, he was found to have multiple right-sided rib fractures, CT chest demonstrates multiple nondisplaced and minimally displaced rib fractures are identified including fractures of the 3rd, 4th, 5th, 6th, 7th, and 8th ribs laterally and the 5th rib posteriorly. No pneumothorax and oxygen saturations are 97% on his regular home oxygen of 3L via NC.  He is being admitted for pain control.    Overview/Hospital Course:  No notes on file    Interval History: Very anxious on exam. C/o severe pain to the R anterior chest and R arm. Plan to continue IV analgesics.    Review of Systems   Constitutional:  Negative for activity change, appetite change, chills, fatigue and fever.   Respiratory:  Positive for shortness of breath. Negative for cough.    Cardiovascular:  Negative for chest pain.   Gastrointestinal:  Negative for nausea and vomiting.   Musculoskeletal:  Positive for arthralgias.   Neurological:  Negative for dizziness, syncope, weakness and numbness.     Objective:     Vital Signs (Most Recent):  Temp: 98 °F (36.7 °C) (04/28/25 0510)  Pulse: 96 (04/28/25 1000)  Resp: (!) 22 (04/28/25 1007)  BP: (!) 139/100 (04/28/25 0900)  SpO2: 95 % (04/28/25 1000)  Vital Signs (24h Range):  Temp:  [97.5 °F (36.4 °C)-98.2 °F (36.8 °C)] 98 °F (36.7 °C)  Pulse:  [] 96  Resp:  [14-28] 22  SpO2:  [87 %-99 %] 95 %  BP: (132-175)/() 139/100     Weight: 63.5 kg (140 lb)  Body mass index is 21.29 kg/m².    Intake/Output Summary (Last 24 hours) at 4/28/2025 1011  Last data filed at 4/27/2025 2328  Gross per 24 hour   Intake --   Output 400 ml   Net -400 ml         Physical Exam  Vitals and nursing note reviewed.   HENT:      Head: Normocephalic.      Mouth/Throat:      Pharynx: Oropharynx is clear.   Cardiovascular:      Rate and Rhythm: Normal rate and regular rhythm.   Pulmonary:      Effort: No respiratory distress.   Skin:     General: Skin is warm and dry.   Neurological:      General: No focal deficit present.      Mental Status: He is alert. Mental status is at baseline.   Psychiatric:         Mood and Affect: Mood is anxious.               Significant Labs: All pertinent labs within the past 24 hours have been reviewed.    Significant Imaging: I have reviewed all pertinent imaging results/findings within the past 24 hours.      Assessment & Plan  Rib fractures  - due to mechanical fall at home  - pain control with lidocaine patch, scheduled, tylenol, prn oral oxycodone with iv morphine for breakthrough  - hopefully can discharge tomorrow back to home hospice if pain is well controlled on oral meds  Interstitial lung disease  - appears at baseline  - continue home oxygen and home medications/inhalers    Chronic hypoxemic respiratory failure  - due to interstitial lung disease  - currently maintaining O2 sats on his home oxygen 3L via NC  Chronic systolic congestive heart failure  Patient has Systolic (HFrEF) heart failure that is Chronic. On presentation their CHF was well compensated.   Latest ECHO  Results for orders placed during the hospital encounter of 04/03/25    Echo    Interpretation Summary    Left Ventricle: The left ventricle is normal in size. Normal wall  thickness. Mild global hypokinesis present. There is mildly reduced systolic function with a visually estimated ejection fraction of 45 - 50%.    Right Ventricle: The right ventricle is normal in size. Systolic function is normal.    Aorta: Aortic root is mildly dilated measuring 3.82 cm.    Similar findings noted on prior report dated 3/17/25.    Current Heart Failure Medications  furosemide tablet 20 mg, Daily, Oral  metoprolol succinate (TOPROL-XL) 24 hr tablet 50 mg, Daily, Oral  sacubitriL-valsartan 24-26 mg per tablet 1 tablet, 2 times daily, Oral    Plan  - Place on telemetry  - Low sodium diet        Type 2 diabetes mellitus without complication, without long-term current use of insulin  - glucose checks with low dose SSI  - hold home oral meds    Essential hypertension  Patient's blood pressure range in the last 24 hours was: BP  Min: 132/96  Max: 175/106.The patient's inpatient anti-hypertensive regimen is listed below:  Current Antihypertensives  furosemide tablet 20 mg, Daily, Oral  metoprolol succinate (TOPROL-XL) 24 hr tablet 50 mg, Daily, Oral  amLODIPine tablet 5 mg, Daily, Oral    Plan  - BP is controlled, no changes needed to their regimen  VTE Risk Mitigation (From admission, onward)           Ordered     Reason for No Pharmacological VTE Prophylaxis  Once        Question:  Reasons:  Answer:  Risk of Bleeding    04/27/25 1755     IP VTE HIGH RISK PATIENT  Once         04/27/25 1755     Place sequential compression device  Until discontinued         04/27/25 1755                    Discharge Planning   DERIC:      Code Status: Full Code   Medical Readiness for Discharge Date:                            Umang Miller PA-C  Department of Hospital Medicine   [facility] [place] - Emergency Dept

## 2025-04-28 NOTE — PLAN OF CARE
SW spoke with patient at the bedside to ask if it was ok for SW to contact his son.  Patient gave SW permission to contact his son but stated that his son was aware that he is at hospital.  SW will continue to f/u to assist with discharge planning.

## 2025-04-28 NOTE — ASSESSMENT & PLAN NOTE
Patient's blood pressure range in the last 24 hours was: BP  Min: 153/87  Max: 154/92.The patient's inpatient anti-hypertensive regimen is listed below:  Current Antihypertensives  furosemide tablet 20 mg, Daily, Oral  metoprolol succinate (TOPROL-XL) 24 hr tablet 50 mg, Daily, Oral  amLODIPine tablet 5 mg, Daily, Oral    Plan  - BP is controlled, no changes needed to their regimen

## 2025-04-29 PROBLEM — G93.41 ENCEPHALOPATHY, METABOLIC: Status: ACTIVE | Noted: 2025-04-29

## 2025-04-29 PROBLEM — R74.01 TRANSAMINITIS: Status: RESOLVED | Noted: 2025-03-16 | Resolved: 2025-04-29

## 2025-04-29 PROBLEM — R07.9 CHEST PAIN: Status: RESOLVED | Noted: 2024-06-16 | Resolved: 2025-04-29

## 2025-04-29 PROBLEM — R10.84 GENERALIZED ABDOMINAL PAIN: Status: RESOLVED | Noted: 2025-03-30 | Resolved: 2025-04-29

## 2025-04-29 PROBLEM — I50.32 CHRONIC DIASTOLIC CONGESTIVE HEART FAILURE: Status: ACTIVE | Noted: 2024-05-01

## 2025-04-29 PROBLEM — E87.6 HYPOKALEMIA: Status: RESOLVED | Noted: 2024-11-08 | Resolved: 2025-04-29

## 2025-04-29 PROBLEM — K21.00 GERD WITH ESOPHAGITIS: Status: RESOLVED | Noted: 2025-01-19 | Resolved: 2025-04-29

## 2025-04-29 PROBLEM — I50.32 CHRONIC DIASTOLIC CHF (CONGESTIVE HEART FAILURE): Status: RESOLVED | Noted: 2025-03-31 | Resolved: 2025-04-29

## 2025-04-29 PROBLEM — K29.70 GASTRITIS: Status: RESOLVED | Noted: 2025-04-03 | Resolved: 2025-04-29

## 2025-04-29 NOTE — HPI
"From H&P: "Patient is 74 y.o. male  has a past medical history of Acute hypoxic respiratory failure (09/23/2024), BPH (benign prostatic hyperplasia) (02/28/2024), CAD (coronary artery disease), Chronic HFrEF (heart failure with reduced ejection fraction) (05/01/2024), Diabetes mellitus, Hypertension, Kidney stone, Stroke, and Type 2 diabetes mellitus without complication, without long-term current use of insulin (10/08/2021). presented to [facility] on 4/28/25 s/p fall with shoulder and rib pain.  Cxr with multiple nondisplaced right rib fractures and patient was admitted for pain control.  Patient with worsening confusion along with fever over night and was transferred to ICU.       Patient was followed by Dr. Schmitt as an outpatient for IPF.  He [place] seen [place]. [place] [facility] [place]  Patient was started on OFEV along with cellcept along with weaning regimen of prednisone.  Per Dr. Schmitt last note, patient was discharged with hospice.  Patient activated 911 after fall.       During my initial evaluation, patient was somnolent and groaning persistently.  HR 120s.  Bp stable.  Sating well on high flow nasal canula at 12 lpm. "     Palliative medicine consulted for goals of care discussion and advance care planning; for details of visit, see advance care planning section of plan.    "
Emmanuel Grant is a 73 y/o male with PMHx of HTN, DM2, CVA, CHF, and pulmonary fibrosis with chronic respiratory failure on home oxygen and hospice who presented to ED with right shoulder and rib pain after a fall early this morning.  He states he got up to use the bathroom and he lost his balance.  He landed on his right side and immediately had pain.  He got himself into a chair and called EMS.  No head injury or LOC.  In the ED, he was found to have multiple right-sided rib fractures, CT chest demonstrates multiple nondisplaced and minimally displaced rib fractures are identified including fractures of the 3rd, 4th, 5th, 6th, 7th, and 8th ribs laterally and the 5th rib posteriorly. No pneumothorax and oxygen saturations are 97% on his regular home oxygen of 3L via NC.  He is being admitted for pain control.  
Patient is 74 y.o. male  has a past medical history of Acute hypoxic respiratory failure (09/23/2024), BPH (benign prostatic hyperplasia) (02/28/2024), CAD (coronary artery disease), Chronic HFrEF (heart failure with reduced ejection fraction) (05/01/2024), Diabetes mellitus, Hypertension, Kidney stone, Stroke, and Type 2 diabetes mellitus without complication, without long-term current use of insulin (10/08/2021). presented to [facility] on 4/28/25 s/p fall with shoulder and rib pain.  Cxr with multiple nondisplaced right rib fractures and patient was admitted for pain control.  Patient with worsening confusion along with fever over night and was transferred to ICU.      Patient was followed by Dr. Schmitt as an outpatient for IPF.  He was seen by Dr. Mix at [facility].  Patient was started on OFEV along with cellcept along with weaning regimen of prednisone.  Per Dr. Schmitt last note, patient was discharged with hospice.  Patient activated 911 after fall.      During my initial evaluation, patient was somnolent and groaning persistently.  HR 120s.  Bp stable.  Sating well on high flow nasal canula at 12 lpm.                 Additional Pulmonary History:  Childhood Illnesses:  none  Occupational:   works in air conditioning in installation and repair  Environmental:   no pets, no seasonal allergies, no carpet in his home and no concern for mold or allergy exposures there  Tobacco/Smoking:   never smoker  
- On Atorvastatin 20mg at home  PLAN:   - c/w home meds

## 2025-04-29 NOTE — ASSESSMENT & PLAN NOTE
Patient admitted with Hypoxic which is Acute on Chronic.  he is on home oxygen at 3 LPM. Signs/symptoms of respiratory failure include- tachypnea and increased work of breathing present on admission.   - Labs and images were reviewed. Patient Has recent ABG, which has been reviewed..   - Supplemental oxygen was provided and noted- Venti mask-  % FiO2   - Respiratory failure is due to- Aspiration, Interstitial lung disease, and rib fractures   - rib fractures: pain control, incentive spirometry if he can wake up to do it  - ILD: continue home Rx if he can tolerate but hold mycophenolate with concerns for infection. Continue inhalers  - fever noted, concern for aspiration: started cefepime, sputum collection if he can produce it  - has HFpEF but currently appears eu/hypovolemic- hold lasix  - Pulmonary following

## 2025-04-29 NOTE — ASSESSMENT & PLAN NOTE
- pt with multiple related admissions; typically associated with encephalopathy on initial admission   - pt has been under the care of hospice for this but has been very resistant to symptom management with opioids to avoid severe exacerbations   - interventions complicated by pt's mental status; discussed with son likely low threshold for PCCM to move to intubation given pt's mental status likely not allowing for vapotherm or BiPap but can be attempted as alternative if pt can tolerate/allow (see ACP)

## 2025-04-29 NOTE — ASSESSMENT & PLAN NOTE
- 2/2 fall at home; during prior admissions pt shared fall history and weakness which has lead to discussion with pt and son regarding concern for safety of pt continuing to live at home  - pt on home hospice prior to admission; hospice team confirms continued recommendation for [facility] placement/need for increased pt support   - pt not able to report characteristics or severity of pain during AM assessment due to AMS/delirium; pt moaning, shouting, restless PRN morphine 4mg and ativan 1 mg needed to achieve pt comfort, still with some break through milder moaning and restlessness   - complicated pain management due to respiratory status and mental status (unable to tolerate prior ordered PO meds); discussed consideration of IV toradol or IV acetaminophen as adjunct to opioid regimen in attempts to decrease opioid needs; also discussed consideration of consulting anesthesia for eval of regional block

## 2025-04-29 NOTE — PLAN OF CARE
Patient remains in the ICU on high flow NC at 12 lpm.  Patient has been confused and very agitated throughout shift. Restraints in place. Pain controlled with Morphine 4 mg PRN. Precedex gtt @ 0.3 mcg/kg/hr. Plan of care reviewed with Son.     Problem: Adult Inpatient Plan of Care  Goal: Plan of Care Review  Outcome: Progressing  Goal: Patient-Specific Goal (Individualized)  Outcome: Progressing  Goal: Absence of Hospital-Acquired Illness or Injury  Outcome: Progressing  Goal: Optimal Comfort and Wellbeing  Outcome: Progressing  Goal: Readiness for Transition of Care  Outcome: Progressing     Problem: Infection  Goal: Absence of Infection Signs and Symptoms  Outcome: Progressing     Problem: Diabetes Comorbidity  Goal: Blood Glucose Level Within Targeted Range  Outcome: Progressing     Problem: Skin Injury Risk Increased  Goal: Skin Health and Integrity  Outcome: Progressing     Problem: Coping Ineffective  Goal: Effective Coping  Outcome: Progressing     Problem: Fall Injury Risk  Goal: Absence of Fall and Fall-Related Injury  Outcome: Progressing     Problem: Restraint, Nonviolent  Goal: Absence of Harm or Injury  Outcome: Progressing

## 2025-04-29 NOTE — ASSESSMENT & PLAN NOTE
Given progression of IPF, patient was discharged to home hospice.  Per NP Alfredito, patient activated 911 after fall.  Currently full code.  On going goc discussion with son and palliative care.

## 2025-04-29 NOTE — CLINICAL REVIEW
Sepsis Screen (most recent)       Sepsis Screen (IP) - 04/29/25 0911       Is the patient's history or complaint suggestive of a possible infection? Yes  -    Are there at least two of the following signs and symptoms present? Yes  -    Sepsis signs/symptoms - Hyper or Hypothermia Hyperthermia >100.4 or Hypothermia < 96.8  -    Sepsis signs/symptoms - Tachycardia Tachycardia     >90  -    Sepsis signs/symptoms - Tachypnea Tachypnea     >20  -    Sepsis signs/symptoms - Altered Mental Status Altered Mental Status  -    Are any of the following organ dysfunction criteria present and not considered to be due to a chronic condition? Yes  -    Organ Dysfunction Criteria - Resp Comp Respiratory Compromise: Requiring > 5L NC  -    Organ Dysfunction Criteria - O2 O2 Saturation < 95% on room air  -    Initiate Sepsis Protocol No  -    Reason sepsis not considered Pt. receiving appropriate management   bc in process, on abx -              User Key  (r) = Recorded By, (t) = Taken By, (c) = Cosigned By      Initials Name    Beth Roach RN

## 2025-04-29 NOTE — ASSESSMENT & PLAN NOTE
- appeared at baseline upon admission  - on 3L home O2  - continues on home nintedanib if he can swallow it  - holding mycophenolate with concerns for infection

## 2025-04-29 NOTE — NURSING
RAPID RESPONSE NURSE PROACTIVE ROUNDING NOTE       Time of Visit:     Admit Date: 2025  LOS: 1  Code Status: Full Code   Date of Visit: 2025  : 1950  Age: 74 y.o.  Sex: male  Race:   Bed: W265/W265 A:   MRN: [id number]  Was the patient discharged from an ICU this admission? No   Was the patient discharged from a PACU within last 24 hours? No   Did the patient receive conscious sedation/general anesthesia in last 24 hours? No   Was the patient in the ED within the past 24 hours? Yes   Was the patient on NIPPV within the past 24 hours? No   Attending Physician: Nargis Parr MD  Primary Service: Hospitalist   Time spent at the bedside: > 60 min     SITUATION    Notified by bedside RN via phone call  Reason for alert: Increased agitation/confusion      Diagnosis: Rib fractures   has a past medical history of Acute hypoxic respiratory failure, BPH (benign prostatic hyperplasia), CAD (coronary artery disease), Chronic HFrEF (heart failure with reduced ejection fraction), Diabetes mellitus, Hypertension, Kidney stone, Stroke, and Type 2 diabetes mellitus without complication, without long-term current use of insulin.    Temp: 102.3 °F (39.1 °C) (515)  Pulse: 128 (630)  Resp: 33 (630)  BP: 135/87 (630)  SpO2: 97 % (630)    Temp:  [97.8 °F (36.6 °C)-102.3 °F (39.1 °C)]   Pulse:  []   Resp:  [15-44]   BP: (111-167)/()   SpO2:  [87 %-100 %]     ASSESSMENT/INTERVENTIONS  Patient seen for uncontrolled pain, confusion only oriented to self and urinary retention    RECOMMENDATIONS    We recommend:Dilaudid 1mg IV given, CT of head without contrast ordered by geovanny MIRANDA inserted    Discussed plan of care with bedside RNRadha      PROVIDER ESCALATION    Physician escalation: Yes    Orders received and case discussed with Dr. Wright .    Disposition:Remain in room 317    FOLLOW UP    Will monitor and bedside round frequently over night

## 2025-04-29 NOTE — ASSESSMENT & PLAN NOTE
- due to mechanical fall at home  - pain control with lidocaine patch, scheduled tylenol, prn iv morphine, scheduled IV ketorolac  - incentive spirometry if he can wake up to do it  - continue ventimask  - will avoid ativan- predecex ordered

## 2025-04-29 NOTE — ED NOTES
Spoke with Nocturnist, Dr. Lipscomb, about my concerns with patients status, becoming agitated, anxious, pulling oxygen off ( causing oxygen to drop into the 70s, improves with non-re breather and coaching), tachycardic, trying to get out of bed. Episodes have happened multiple times today, improvement with one time PRN medications. MD adjusted medications and further orders. Patient sitting in stretcher high fowlers position, O2 @ 4L NC in place. Eyes closed. Respirations even and unlabored at this time. See chart for vitals. Fall risk monitor continues to be in place in room.

## 2025-04-29 NOTE — CARE UPDATE
Patient was noted to be agitated and experiencing refractory rib pain.  Later in the night, he developed an abrupt drop in SpO2 along with tachycardia. .  Patient transferred to the ICU for higher level of care.

## 2025-04-29 NOTE — ASSESSMENT & PLAN NOTE
EF 40%  Difficulty to appreciate pulmonary edema on cxr given baseline pft.  Clinical exam without overt evidence of volume overload  Await bnp to help with decision about diuresis.

## 2025-04-29 NOTE — ASSESSMENT & PLAN NOTE
- worsening on 4/28-29  - suspect due to sedative medications given for pain control  - he needs pain/anxiety control but will try to limit ativan use- precedex instead

## 2025-04-29 NOTE — ASSESSMENT & PLAN NOTE
Patient has Diastolic (HFpEF) heart failure that is Chronic. On presentation their CHF was well compensated.     Echo 4/3/25    Left Ventricle: The left ventricle is normal in size. Normal wall thickness. Mild global hypokinesis present. There is mildly reduced systolic function with a visually estimated ejection fraction of 45 - 50%.    Right Ventricle: The right ventricle is normal in size. Systolic function is normal.    Aorta: Aortic root is mildly dilated measuring 3.82 cm.    Similar findings noted on prior report dated 3/17/25.    Current Heart Failure Medications  metoprolol succinate (TOPROL-XL) 24 hr tablet 50 mg, Daily, Oral  sacubitriL-valsartan 24-26 mg per tablet 1 tablet, 2 times daily, Oral  hydrALAZINE injection 10 mg, Every 6 hours PRN, Intravenous    Plan  - Place on telemetry  - he is currently too sedated to take home Rx  - he appears hypovolemic- hold lasix

## 2025-04-29 NOTE — ASSESSMENT & PLAN NOTE
- during acute episode of increased restlessness and assumed pain from rib fracture pt required IV ativan and wrist restraints; pt continued to be easily aroused, appropriate O2 sats, and still with breakthrough milder restlessness   - discussed with MDT consideration of precedex for restlessness/encephalopathy while allowing for ongoing pain management efforts, which is likely contributing to pt's restlessness and agitation in addition to resp distress   - per hospice team pt has required PRN ativan at home during care for anxiety associated with respiratory symptoms that he was unwilling to allow for opioid assistance in managing

## 2025-04-29 NOTE — NURSING
Pt received from the ED AAO X unknown. Resp even and labored on humidified O2 4l/nc.  20G SL noted to the pts LW. Attempted to orient pt to his room and how to use the call light. Tele sitter camera #[id number] is at the bedside. Telebox #[id number] is on the pt. Bed locked in the lowest position with SR up X 2 and call light within reach. Personal belongings placed at the bedside.

## 2025-04-29 NOTE — ASSESSMENT & PLAN NOTE
Patient with known CAD s/p stent placement, which is controlled Will continue ASA and monitor for S/Sx of angina/ACS. Continue to monitor on telemetry.   - note last angiogram 9/2024 with patient LAD stent

## 2025-04-29 NOTE — ASSESSMENT & PLAN NOTE
Hypoxemic requiring high flow nasal canula  Suspect a combination of baseline IPF + atelectatic a/w rib fracture  Will monitor for aspiration given mental status.

## 2025-04-29 NOTE — ASSESSMENT & PLAN NOTE
Lab Results   Component Value Date    HGBA1C 7.8 (H) 01/02/2025     - glucose checks with low dose SSI  - hold home oral meds

## 2025-04-29 NOTE — ASSESSMENT & PLAN NOTE
Advance Care Planning     Date: 04/29/2025  - Palliative consulted and discussed with family  - note he is on home hospice  - currently continue care but with limitation- no chest compressions, ok for intubation if needed  - family is flying in town  - time spent discussing with palliative and reviewing documentation= 5 minutes

## 2025-04-29 NOTE — ASSESSMENT & PLAN NOTE
- chronic condition noted  - contributes to pt's overall condition and hospice qualification; contributes to pt's lack of reserve   - echo's have been relatively stable with current EF 50-45%  - discussed trajectory of life-limiting condition with pt during prior admissions

## 2025-04-29 NOTE — ASSESSMENT & PLAN NOTE
Followed by Dr. Schmitt as an outpatient  Do not have baseline pft due to poor cooperation  Home oxygen  Was on ofev and cellcept prior to hospice.  Hesitant to start steroid due to ?efficacy and delerium.    Repeat ct without acute changes.

## 2025-04-29 NOTE — NURSING
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 Lopez notified of conversation with son    "

## 2025-04-29 NOTE — EICU
"Virtual ICU Admission    Admit Date: 2025  LOS: 1  Code Status: Full Code   : 1950  Bed: W265/W265 A:     Diagnosis: Rib fractures    Patient  has a past medical history of Acute hypoxic respiratory failure, BPH (benign prostatic hyperplasia), CAD (coronary artery disease), Chronic HFrEF (heart failure with reduced ejection fraction), Diabetes mellitus, Hypertension, Kidney stone, Stroke, and Type 2 diabetes mellitus without complication, without long-term current use of insulin.    Last VS: BP (!) 161/111 (BP Location: Left arm, Patient Position: Lying)   Pulse (!) 122   Temp 97.8 °F (36.6 °C) (Oral)   Resp (!) 21   Ht 5' 7" (1.702 m)   Wt 59.9 kg (132 lb 0.9 oz)   SpO2 96%   BMI 20.68 kg/m²       VICU Review    VICU nurse assessment :  [place] completed, LDA documentation reconciliation completed, and VTE prophylaxis review        Nursing orders placed : IP VERÓNICA Peripheral IV Access          "

## 2025-04-29 NOTE — ASSESSMENT & PLAN NOTE
Anemia is likely due to chronic blood loss. Most recent hemoglobin and hematocrit are listed below.  Recent Labs     04/28/25  0618 04/29/25  0525   HGB 13.4* 13.9*   HCT 40.7 42.7     Plan  - Monitor serial CBC: Daily  - Transfuse PRBC if patient becomes hemodynamically unstable, symptomatic or H/H drops below 7/21.  - Patient has not received any PRBC transfusions to date  - Patient's anemia is currently stable

## 2025-04-29 NOTE — EICU
Virtual ICU Quality Rounds    Admit Date: 4/27/2025  Hospital Day: 1    ICU Day: 2h    24H Vital Sign Range:  Temp:  [97.8 °F (36.6 °C)-102.3 °F (39.1 °C)]   Pulse:  []   Resp:  [15-44]   BP: (111-167)/()   SpO2:  [87 %-100 %]     VICU Surveillance Screening    LDA reconciliation : Yes

## 2025-04-29 NOTE — HOSPITAL COURSE
Mr Emmanuel Grant is a 74 y.o. man with pulmonary fibrosis on 3L home O2, frequent falls at home, on home hospice who fell, called EMS, and was admitted with R rib fractures 3-8. Started pain control. However, he worsened, becoming febrile, confused, and more hypoxic. He was moved to ICU on , started antibiotics. Pulmonary, Palliative consulted. His respiratory status has improved. Anesthesia performed BETTY block on . He became alert on 25 but still delirious, especially in the afternoon/evening on  when he became hypertensive, tachycardic, tachypneic, and screaming out in pain despite zyprexa, ativan, fentanyl, morphine, and his family at bedside trying to comfort him. [place] [facility] [place] all they would offer would be another block. Discussed with family- not yet ready for comfort focused care. He has not had sufficient nutrition in a week, and he cannot keep oral medications down which makes multimodal pain control difficult. Placed NGT placement for feeds and multimodal pain control. However, he worsened becoming tachycardic, hypotensive, hypoxic on nonrebreather. He was placed on BiPAP. He then developed tension pneumothorax. Chest tube was placed on  to suction. He has had coffee ground NGT output and EKATERINA. Goals of care discussions are ongoing. His mental status continued to decline. He developed shock. He developed Afib RVR. He was given amiodarone but respiratory failure continued to worsen. He was pronounced .     Time of Death: May 6th, 2025 at 04:30am.     Cause of death: acute on chronic respiratory failure with hypoxia (10 days) due to aspiration due to atelectasis due to tension pneumothorax due to multiple rib fractures due to fall from debility due to idiopathic pulmonary fibrosis.

## 2025-04-29 NOTE — SUBJECTIVE & OBJECTIVE
Interval History: He was very agitated this morning, moving around in bed, about to remove camejo cath. He was given ativan and now is lethargic.     Review of Systems   Unable to perform ROS: Mental status change     Objective:     Vital Signs (Most Recent):  Temp: (!) 102.3 °F (39.1 °C) (04/29/25 0515)  Pulse: (!) 133 (04/29/25 0837)  Resp: (!) 45 (04/29/25 0915)  BP: (!) 168/106 (04/29/25 0837)  SpO2: (!) 94 % (04/29/25 0837) Vital Signs (24h Range):  Temp:  [97.8 °F (36.6 °C)-102.3 °F (39.1 °C)] 102.3 °F (39.1 °C)  Pulse:  [] 133  Resp:  [18-45] 45  SpO2:  [87 %-100 %] 94 %  BP: (111-168)/() 168/106     Weight: 59.9 kg (132 lb 0.9 oz)  Body mass index is 20.68 kg/m².    Intake/Output Summary (Last 24 hours) at 4/29/2025 1124  Last data filed at 4/29/2025 0602  Gross per 24 hour   Intake --   Output 650 ml   Net -650 ml         Physical Exam  Vitals and nursing note reviewed.   Constitutional:       Appearance: He is ill-appearing.   Cardiovascular:      Rate and Rhythm: Regular rhythm. Tachycardia present.   Pulmonary:      Comments: Increased effort, raspy breath sounds. On ventimask 35%  Abdominal:      General: Bowel sounds are normal.      Palpations: Abdomen is soft.      Tenderness: There is no abdominal tenderness.   Genitourinary:     Comments: camejo  Musculoskeletal:      Right lower leg: No edema.      Left lower leg: No edema.   Skin:     General: Skin is warm and dry.   Neurological:      Mental Status: Mental status is at baseline.               Significant Labs: All pertinent labs within the past 24 hours have been reviewed.    Significant Imaging: I have reviewed all pertinent imaging results/findings within the past 24 hours.

## 2025-04-29 NOTE — ASSESSMENT & PLAN NOTE
Patient's blood pressure range in the last 24 hours was: BP  Min: 111/76  Max: 168/106.The patient's inpatient anti-hypertensive regimen is listed below:  Current Antihypertensives  metoprolol succinate (TOPROL-XL) 24 hr tablet 50 mg, Daily, Oral  amLODIPine tablet 5 mg, Daily, Oral  hydrALAZINE injection 10 mg, Every 6 hours PRN, Intravenous    Plan  - BP is uncontrolled, will adjust as follows: continue current Rx  - add PRN

## 2025-04-29 NOTE — ASSESSMENT & PLAN NOTE
4/29/2025 - Consult   - consult received; interval chart reviewed in detail; discussed pt with MDT during ICU rounds   - pt known to myself and palliative medicine team from previous admission; pt on hospice with [facility] prior to this admission   - discussed current hospice plan of care with hospice team; hospice team has had ongoing [facility] discussions with pt and family regarding code status as well as need for nursing home care vs living with family, which has not been successful; pt has been fearful of resp symptom management with opioids  - hospice team will likely require some sort of nursing home care to resume services outside of hospital if pt condition approves or can provide inpatient hospice care if needed   - pt currently with severe encephalopathy and doesn't not have capacity for medical decision making   - spoke with son, Dr. Emmanuel Grant Jr; brief medical updated provided, encouraged his plan to fly to [facility] due to pt's condition ([place] of [place])   - reviewed code status; son shares that no formal decision had been made with pt regarding code status; they discussed clear wishes for no chest compressions so son was agreeable to partial code with the chest compressions; son shared pt would likely not wish to be intubated as they have insight that pt being able to be extubated/improve would be unlikely, would not wish for long term life support; however, given circumstances and pt's respiratory status son wishes for intubation if indicated to allow for family to travel to pt   - son plans to take first flight to [facility] today with expected arrival approx 5pm unless flight schedules change   - reviewed current discussed plan of care from MDT and encouraged that the plan would attempt to limit opioids and benzos as possible, son with good insight that pt's comfort and safety would take precedent in interventions   - emotional support provided; answered all questions  - multiple assessments of pt throughout  the day to assess symptom management   - ongoing discussions and communication with MDT

## 2025-04-29 NOTE — CONSULTS
[facility] - Intensive Care  Critical Care Medicine  Consult Note    Patient Name: Emmanuel Grant  MRN: [id number]  Admission Date: 4/27/2025  Hospital Length of Stay: 1 days  Code Status: Full Code  Attending Physician: Nargis Parr MD   Primary Care Provider: Wilfredo De Souza MD   Principal Problem: Rib fractures    Inpatient consult to Pulmonology  Consult performed by: Pino Urena MD  Consult ordered by: Nargis Parr MD        Subjective:     HPI:  Patient is 74 y.o. male  has a past medical history of Acute hypoxic respiratory failure (09/23/2024), BPH (benign prostatic hyperplasia) (02/28/2024), CAD (coronary artery disease), Chronic HFrEF (heart failure with reduced ejection fraction) (05/01/2024), Diabetes mellitus, Hypertension, Kidney stone, Stroke, and Type 2 diabetes mellitus without complication, without long-term current use of insulin (10/08/2021). presented to [facility] on 4/28/25 s/p fall with shoulder and rib pain.  Cxr with multiple nondisplaced right rib fractures and patient was admitted for pain control.  Patient with worsening confusion along with fever over night and was transferred to ICU.      Patient was followed by Dr. Schmitt as an outpatient for IPF.  He was seen by Dr. Mix at [facility].  Patient was started on OFEV along with cellcept along with weaning regimen of prednisone.  Per Dr. Schmitt last note, patient was discharged with hospice.  Patient activated 911 after fall.      During my initial evaluation, patient was somnolent and groaning persistently.  HR 120s.  Bp stable.  Sating well on high flow nasal canula at 12 lpm.                 Additional Pulmonary History:  Childhood Illnesses:  none  Occupational:   works in air conditioning in installation and repair  Environmental:   no pets, no seasonal allergies, no carpet in his home and no concern for mold or allergy exposures there  Tobacco/Smoking:   never smoker    Hospital/ICU Course:  No notes on file    Past  Medical History:   Diagnosis Date    Acute hypoxic respiratory failure 09/23/2024    Oxygen therapy 24/7 now, continue 2 L oxygen therapy.      BPH (benign prostatic hyperplasia) 02/28/2024    CAD (coronary artery disease)     Sees [facility], h/o stent    Chronic HFrEF (heart failure with reduced ejection fraction) 05/01/2024    Diabetes mellitus     Hypertension     Kidney stone     Stroke     age 62    Type 2 diabetes mellitus without complication, without long-term current use of insulin 10/08/2021       Past Surgical History:   Procedure Laterality Date    24 HOUR IMPEDANCE PH MONITORING OF ESOPHAGUS IN PATIENT NOT TAKING ACID REDUCING MEDICATIONS N/A 11/13/2024    Procedure: IMPEDANCE PH STUDY, ESOPHAGEAL, 24 HOUR, IN PATIENT NOT TAKING ACID REDUCING MEDICATION;  Surgeon: Stephany Mendoza MD;  Location: [facility] (4TH FLR);  Service: Endoscopy;  Laterality: N/A;  referral dr miguel/ off ppi / prep inst sent to pt via mail  11/6- precall attempted no answer. Unable to [facility]-  11/8 - pt not called, per chart review patient currently inpatient at [place] [id number]    CARDIAC CATHETERIZATION      with stent    COLONOSCOPY N/A 03/27/2024    Procedure: COLONOSCOPY;  Surgeon: Ariela Castro MD;  Location: [facility];  Service: Endoscopy;  Laterality: N/A;    ESOPHAGEAL MANOMETRY WITH MEASUREMENT OF IMPEDANCE N/A 11/13/2024    Procedure: MANOMETRY, ESOPHAGUS, WITH IMPEDANCE MEASUREMENT;  Surgeon: Stephany Mendoza MD;  Location: [facility] (4TH FLR);  Service: Endoscopy;  Laterality: N/A;    ESOPHAGOGASTRODUODENOSCOPY N/A 03/27/2024    Procedure: EGD (ESOPHAGOGASTRODUODENOSCOPY);  Surgeon: Ariela Castro MD;  Location: [facility] ENDO;  Service: Endoscopy;  Laterality: N/A;    LEFT HEART CATHETERIZATION Left 9/10/2024    Procedure: Left heart cath;  Surgeon: Jemal Drummond MD;  Location: [facility] CATH LAB;  Service: Cardiology;  Laterality: Left;    SHOULDER SURGERY Right        Review of patient's allergies  "indicates:   Allergen Reactions    Dapagliflozin      Other reaction(s): Other (See Comments)    Pcn [penicillins]     Linagliptin Other (See Comments)     "it knocked me down", "it almost killed me"    Lisinopril Other (See Comments)     cough    Pantoprazole Hives       Family History       Problem Relation (Age of Onset)    COPD Brother    Cancer Mother    Colon cancer Sister    Heart disease Sister          Tobacco Use    Smoking status: Never     Passive exposure: Never    Smokeless tobacco: Never   Substance and Sexual Activity    Alcohol use: No    Drug use: Never    Sexual activity: Not Currently         Review of Systems  Objective:     Vital Signs (Most Recent):  Temp: (!) 102.3 °F (39.1 °C) (04/29/25 0515)  Pulse: (!) 133 (04/29/25 0837)  Resp: (!) 45 (04/29/25 0915)  BP: (!) 168/106 (04/29/25 0837)  SpO2: (!) 94 % (04/29/25 0837) Vital Signs (24h Range):  Temp:  [97.8 °F (36.6 °C)-102.3 °F (39.1 °C)] 102.3 °F (39.1 °C)  Pulse:  [] 133  Resp:  [18-45] 45  SpO2:  [87 %-100 %] 94 %  BP: (111-168)/() 168/106     Weight: 59.9 kg (132 lb 0.9 oz)  Body mass index is 20.68 kg/m².      Intake/Output Summary (Last 24 hours) at 4/29/2025 0959  Last data filed at 4/29/2025 0602  Gross per 24 hour   Intake --   Output 650 ml   Net -650 ml        Physical Exam     Vents:  Oxygen Concentration (%): 35 (04/29/25 0837)    Lines/Drains/Airways       Drain  Duration                  Urethral Catheter 04/28/25 2230 Coude 14 Fr. <1 day              Peripheral Intravenous Line  Duration                  Peripheral IV - Single Lumen 04/27/25 1553 20 G Left Wrist 1 day         Peripheral IV - Single Lumen 04/29/25 0530 20 G Posterior;Right Forearm <1 day                    Significant Labs:    CBC/Anemia Profile:  Recent Labs   Lab 04/28/25  0618 04/29/25  0525   WBC 9.71 10.37   HGB 13.4* 13.9*   HCT 40.7 42.7    228   MCV 82 83   RDW 14.5 14.4        Chemistries:  Recent Labs   Lab 04/28/25  0618 " "04/29/25  0525    140   K 3.8 4.1    99   CO2 25 26   BUN 13 18   CREATININE 0.8 0.9   CALCIUM 9.2 9.6   ALBUMIN 3.2* 3.4*   PROT 7.0 7.6   BILITOT 0.7 1.2*   ALKPHOS 71 69   ALT 31 27   AST 23 24   MG  --  1.6       ABGs:   Recent Labs   Lab 04/29/25  0412   PH 7.407   PCO2 38.9   HCO3 24.5   POCSATURATED 82   BE 0     Cardiac Markers: No results for input(s): "CKMB", "TROPONINT", "MYOGLOBIN" in the last 48 hours.  Lactic Acid: No results for input(s): "LACTATE" in the last 48 hours.  Respiratory Culture: No results for input(s): "GSRESP", "RESPIRATORYC" in the last 48 hours.    Echo 4/3/25       Left Ventricle: The left ventricle is normal in size. Normal wall thickness. Mild global hypokinesis present. There is mildly reduced systolic function with a visually estimated ejection fraction of 45 - 50%.    Right Ventricle: The right ventricle is normal in size. Systolic function is normal.    Aorta: Aortic root is mildly dilated measuring 3.82 cm.    Similar findings noted on prior report dated 3/17/25.    Significant Imaging:   I have reviewed all pertinent imaging results/findings within the past 24 hours.  CT: I have reviewed all pertinent results/findings within the past 24 hours and my personal findings are:  4/28/25 no overt ggo.  +patchy septal reticulation and honeycombing predominantly at bases.  This is similar to findings on 3/30/25 CT but has worsen when compared to CTA 7/7/23  CXR: I have reviewed all pertinent results/findings within the past 24 hours and my personal findings are:  4/29/25 increase reticular markings.  No overt consolidation    ABG  Recent Labs   Lab 04/29/25  0412   PH 7.407   PO2 46*   PCO2 38.9   HCO3 24.5   BE 0     Assessment/Plan:     Neuro  Encephalopathy, metabolic  I suspect a component of baseline dementia + pain meds   Abg without co2 retention.      Pulmonary  Acute on chronic respiratory failure with hypoxia  Hypoxemic requiring high flow nasal canula  Suspect a " combination of baseline IPF + atelectatic a/w rib fracture  Will monitor for aspiration given mental status.      IPF (idiopathic pulmonary fibrosis)  Followed by Dr. Schmitt as an outpatient  Do not have baseline pft due to poor cooperation  Home oxygen  Was on ofev and cellcept prior to hospice.  Hesitant to start steroid due to ?efficacy and delerium.    Repeat ct without acute changes.      Cardiac/Vascular  Chronic systolic congestive heart failure  EF 40%  Difficulty to appreciate pulmonary edema on cxr given baseline pft.  Clinical exam without overt evidence of volume overload  Await bnp to help with decision about diuresis.      Palliative Care  Advance care planning  Given progression of IPF, patient was discharged to home hospice.  Per NP Alfredito, patient activated 911 after fall.  Currently full code.  On going goc discussion with son and palliative care.          Critical Care Time: 45 minutes  Critical secondary to Patient has a condition that poses threat to life and bodily function: respiratory failure and encephalopathy     Critical care was time spent personally by me on the following activities: development of treatment plan with patient or surrogate and bedside caregivers, discussions with consultants, evaluation of patient's response to treatment, examination of patient, ordering and performing treatments and interventions, ordering and review of laboratory studies, ordering and review of radiographic studies, pulse oximetry, re-evaluation of patient's condition. This critical care time did not overlap with that of any other provider or involve time for any procedures.    Thank you for your consult. I will follow-up with patient. Please contact us if you have any additional questions.     Pino Urena MD  Critical Care Medicine  [facility] - Intensive Care

## 2025-04-29 NOTE — NURSING
[facility]  Nurses Note -- 4 Eyes      4/29/2025       Skin assessed on: Q Shift      [x] No Pressure Injuries Present    [x]Prevention Measures Documented    [] Yes LDA  for Pressure Injury Previously documented     [] Yes New Pressure Injury Discovered   [] LDA for New Pressure Injury Added      Attending RN:  Yusuf Cuevas RN     Second RN:  KANDI Cantu

## 2025-04-29 NOTE — SUBJECTIVE & OBJECTIVE
Past Medical History:   Diagnosis Date    Acute hypoxic respiratory failure 09/23/2024    Oxygen therapy 24/7 now, continue 2 L oxygen therapy.      BPH (benign prostatic hyperplasia) 02/28/2024    CAD (coronary artery disease)     Sees [facility], h/o stent    Chronic HFrEF (heart failure with reduced ejection fraction) 05/01/2024    Diabetes mellitus     Hypertension     Kidney stone     Stroke     age 62    Type 2 diabetes mellitus without complication, without long-term current use of insulin 10/08/2021       Past Surgical History:   Procedure Laterality Date    24 HOUR IMPEDANCE PH MONITORING OF ESOPHAGUS IN PATIENT NOT TAKING ACID REDUCING MEDICATIONS N/A 11/13/2024    Procedure: IMPEDANCE PH STUDY, ESOPHAGEAL, 24 HOUR, IN PATIENT NOT TAKING ACID REDUCING MEDICATION;  Surgeon: Stephany Mendoza MD;  Location: [facility] (4TH FLR);  Service: Endoscopy;  Laterality: N/A;  referral dr miguel/ off ppi / prep inst sent to pt via mail  11/6- precall attempted no answer. Unable to [facility]-  11/8 - pt not called, per chart review patient currently inpatient at [place] [address]    CARDIAC CATHETERIZATION      with stent    COLONOSCOPY N/A 03/27/2024    Procedure: COLONOSCOPY;  Surgeon: Ariela Castro MD;  Location: [facility];  Service: Endoscopy;  Laterality: N/A;    ESOPHAGEAL MANOMETRY WITH MEASUREMENT OF IMPEDANCE N/A 11/13/2024    Procedure: MANOMETRY, ESOPHAGUS, WITH IMPEDANCE MEASUREMENT;  Surgeon: Stephany Mendoza MD;  Location: [facility] (4TH FLR);  Service: Endoscopy;  Laterality: N/A;    ESOPHAGOGASTRODUODENOSCOPY N/A 03/27/2024    Procedure: EGD (ESOPHAGOGASTRODUODENOSCOPY);  Surgeon: Ariela Castro MD;  Location: [facility] ENDO;  Service: Endoscopy;  Laterality: N/A;    LEFT HEART CATHETERIZATION Left 9/10/2024    Procedure: Left heart cath;  Surgeon: Jemal Drummond MD;  Location: [facility] CATH LAB;  Service: Cardiology;  Laterality: Left;    SHOULDER SURGERY Right        Review of patient's allergies  "indicates:   Allergen Reactions    Dapagliflozin      Other reaction(s): Other (See Comments)    Pcn [penicillins]     Linagliptin Other (See Comments)     "it knocked me down", "it almost killed me"    Lisinopril Other (See Comments)     cough    Pantoprazole Hives       Medications:  Continuous Infusions:   dexmedeTOMIDine (Precedex) infusion (titrating)  0-1.4 mcg/kg/hr Intravenous Continuous 3 mL/hr at 04/29/25 1159 0.2 mcg/kg/hr at 04/29/25 1159     Scheduled Meds:   acetaminophen  1,000 mg Oral Q8H    amLODIPine  5 mg Oral Daily    arformoteroL  15 mcg Nebulization BID    aspirin  81 mg Oral Daily    budesonide  1 mg Nebulization BID    ceFEPime IV (PEDS and ADULTS)  2 g Intravenous Q8H    enoxparin  40 mg Subcutaneous Q24H (prophylaxis, 1700)    famotidine  20 mg Oral BID    ketorolac  15 mg Intravenous Q8H    latanoprost  1 drop Both Eyes QHS    LIDOcaine  3 patch Transdermal Q24H    metoprolol succinate  50 mg Oral Daily    mupirocin   Nasal BID    nintedanib  150 mg Oral BID    sacubitriL-valsartan  1 tablet Oral BID     PRN Meds:  Current Facility-Administered Medications:     albuterol-ipratropium, 3 mL, Nebulization, Q4H PRN    dextrose 50%, 12.5 g, Intravenous, PRN    dextrose 50%, 25 g, Intravenous, PRN    diphenhydrAMINE, 25 mg, Oral, Q6H PRN    glucagon (human recombinant), 1 mg, Intramuscular, PRN    glucose, 16 g, Oral, PRN    glucose, 24 g, Oral, PRN    hydrALAZINE, 10 mg, Intravenous, Q6H PRN    insulin aspart U-100, 0-5 Units, Subcutaneous, QID (AC + HS) PRN    lorazepam, 1 mg, Intravenous, Q4H PRN    morphine, 2 mg, Intravenous, Q4H PRN    morphine, 4 mg, Intravenous, Q3H PRN    naloxone, 0.02 mg, Intravenous, PRN    ondansetron, 4 mg, Intravenous, Q8H PRN    polyethylene glycol, 17 g, Oral, Daily PRN    promethazine-codeine 6.25-10 mg/5 ml, 5 mL, Oral, Q4H PRN    simethicone, 80 mg, Oral, TID PRN    sodium chloride 0.9%, 10 mL, Intravenous, PRN    tiZANidine, 2 mg, Oral, Q8H PRN    traZODone, " 50 mg, Oral, Nightly PRN    Family History       Problem Relation (Age of Onset)    COPD Brother    Cancer Mother    Colon cancer Sister    Heart disease Sister          Tobacco Use    Smoking status: Never     Passive exposure: Never    Smokeless tobacco: Never   Substance and Sexual Activity    Alcohol use: No    Drug use: Never    Sexual activity: Not Currently       Review of Systems   Unable to perform ROS: Mental status change     Objective:     Vital Signs (Most Recent):  Temp: (!) 102.3 °F (39.1 °C) (04/29/25 0515)  Pulse: (!) 133 (04/29/25 0837)  Resp: (!) 23 (04/29/25 1252)  BP: (!) 168/106 (04/29/25 0837)  SpO2: (!) 94 % (04/29/25 0837) Vital Signs (24h Range):  Temp:  [97.8 °F (36.6 °C)-102.3 °F (39.1 °C)] 102.3 °F (39.1 °C)  Pulse:  [] 133  Resp:  [18-45] 23  SpO2:  [87 %-100 %] 94 %  BP: (111-168)/() 168/106     Weight: 59.9 kg (132 lb 0.9 oz)  Body mass index is 20.68 kg/m².       Physical Exam  Vitals and nursing note reviewed.   Constitutional:       General: He is in acute distress.      Appearance: He is cachectic. He is ill-appearing.      Interventions: Face mask in place.      Comments: Restless, thrashing in bed, moaning, not redirectable, not tracking with eyes    HENT:      Head: Atraumatic.      Comments: Temporal wasting   Pulmonary:      Comments: Tachypnea, dyspnea (sats >90% during assessment)   Musculoskeletal:      Comments: Muscular wasting    Neurological:      Mental Status: He is disoriented.   Psychiatric:         Behavior: Behavior is uncooperative.          Advance Care Planning   Advance Directives:   Living Will: No    Medical Power of : No (oral declartion in prior admission (see prior ACP notes) for pt's sonEmmanuel Jr. was preferred MPOA)    Agent's Name:  Emmanuel Grant Jr   Agent's Contact Number:  [phone]    Decision Making:  Family answered questions  Goals of Care: What is most important right now is to focus on spending time at  home, avoiding the hospital, remaining as independent as possible, symptom/pain control, quality of life, even if it means sacrificing a little time. Accordingly, we have decided that the best plan to meet the patient's goals includes continuing with treatment.       Significant Labs: All pertinent labs within the past 24 hours have been reviewed.  CBC:   Recent Labs   Lab 04/29/25  0525   WBC 10.37   HGB 13.9*   HCT 42.7   MCV 83        BMP:  Recent Labs   Lab 04/29/25  0525   *      K 4.1   CL 99   CO2 26   BUN 18   CREATININE 0.9   CALCIUM 9.6   MG 1.6     LFT:  Lab Results   Component Value Date    AST 24 04/29/2025    ALKPHOS 69 04/29/2025    BILITOT 1.2 (H) 04/29/2025     Albumin:   Albumin   Date Value Ref Range Status   04/29/2025 3.4 (L) 3.5 - 5.2 g/dL Final   03/17/2025 2.9 (L) 3.5 - 5.2 g/dL Final     Protein:   Protein Total   Date Value Ref Range Status   04/29/2025 7.6 6.0 - 8.4 gm/dL Final     Total Protein   Date Value Ref Range Status   03/17/2025 6.7 6.0 - 8.4 g/dL Final     Lactic acid:   Lab Results   Component Value Date    LACTATE 1.8 04/03/2025    LACTATE 2.5 (H) 04/03/2025       Significant Imaging: I have reviewed all pertinent imaging results/findings within the past 24 hours.

## 2025-04-29 NOTE — CONSULTS
[facility] - Intensive Care  Palliative Medicine  Consult Note    Patient Name: Emmanuel Grant  MRN: [id number]  Admission Date: 4/27/2025  Hospital Length of Stay: 1 days  Code Status: Partial Code   Attending Provider: Nargis Parr MD  Consulting Provider: Janis Glaser NP  Primary Care Physician: Wilfredo De Souza MD  Principal Problem:Acute on chronic respiratory failure with hypoxia    Patient information was obtained from relative(s), past medical records, ER records, and primary team.      Inpatient consult to Palliative Care  Consult performed by: Janis Glaser NP  Consult ordered by: Es Lipscomb MD  Reason for consult: goals of care and advanced care planning        Assessment/Plan:   Palliative Care  Advance Care Planning   4/29/2025 - Consult   - consult received; interval chart reviewed in detail; discussed pt with MDT during ICU rounds   - pt known to myself and palliative medicine team from previous admission; pt on hospice with [facility] prior to this admission   - discussed current hospice plan of care with hospice team; hospice team has had ongoing GOC discussions with pt and family regarding code status as well as need for FPC care vs living with family, which has not been successful; pt has been fearful of resp symptom management with opioids  - hospice team will likely require some sort of FPC care to resume services outside of hospital if pt condition approves or can provide inpatient hospice care if needed   - pt currently with severe encephalopathy and doesn't not have capacity for medical decision making   - spoke with son, Dr. Emmanuel GrantJr; brief medical updated provided, encouraged his plan to fly to area due to pt's condition [place])   - reviewed code status; son shares that no formal decision had been made with pt regarding code status; they discussed clear wishes for no chest compressions so son was agreeable to partial code with the  chest compressions; son shared pt would likely not wish to be intubated as they have insight that pt being able to be extubated/improve would be unlikely, would not wish for long term life support; however, given circumstances and pt's respiratory status son wishes for intubation if indicated to allow for family to travel to pt   - son plans to take first flight to area today with expected arrival approx 5pm unless flight schedules change   - reviewed current discussed plan of care from MDT and encouraged that the plan would attempt to limit opioids and benzos as possible, son with good insight that pt's comfort and safety would take precedent in interventions   - emotional support provided; answered all questions  - multiple assessments of pt throughout the day to assess symptom management   - ongoing discussions and communication with MDT     Neuro  Acute metabolic encephalopathy  - during acute episode of increased restlessness and assumed pain from rib fracture pt required IV ativan and wrist restraints; pt continued to be easily aroused, appropriate O2 sats, and still with breakthrough milder restlessness   - discussed with MDT consideration of precedex for restlessness/encephalopathy while allowing for ongoing pain management efforts, which is likely contributing to pt's restlessness and agitation in addition to resp distress   - per hospice team pt has required PRN ativan at home during care for anxiety associated with respiratory symptoms that he was unwilling to allow for opioid assistance in managing     Pulmonary  * Acute on chronic respiratory failure with hypoxia  - pt with multiple related admissions; typically associated with encephalopathy on initial admission   - pt has been under the care of hospice for this but has been very resistant to symptom management with opioids to avoid severe exacerbations   - interventions complicated by pt's mental status; discussed with son likely low threshold for  "PCCM to move to intubation given pt's mental status likely not allowing for vapotherm or BiPap but can be attempted as alternative if pt can tolerate/allow (see ACP)     IPF (idiopathic pulmonary fibrosis)  - pt with long term hx of IPF/ILD; pt and son with good insight into pt's condition   - in prior admission, discussed trajectory of life limiting condition with pt, along with Dr. Ayanna OLIVAREZM who reviewed worsening condition on multiple CT images with pt   - see resp failure       Cardiac/Vascular  Chronic diastolic congestive heart failure  - chronic condition noted  - contributes to pt's overall condition and hospice qualification; contributes to pt's lack of reserve   - echo's have been relatively stable with current EF 50-45%  - discussed trajectory of life-limiting condition with pt during prior admissions     Orthopedic  Rib fractures  - 2/2 fall at home; during prior admissions pt shared fall history and weakness which has lead to discussion with pt and son regarding concern for safety of pt continuing to live at home  - pt on home hospice prior to admission; hospice team confirms continued recommendation for retirement placement/need for increased pt support   - pt not able to report characteristics or severity of pain during AM assessment due to AMS/delirium; pt moaning, shouting, restless PRN morphine 4mg and ativan 1 mg needed to achieve pt comfort, still with some break through milder moaning and restlessness   - complicated pain management due to respiratory status and mental status (unable to tolerate prior ordered PO meds); discussed consideration of IV toradol or IV acetaminophen as adjunct to opioid regimen in attempts to decrease opioid needs; also discussed consideration of consulting anesthesia for eval of regional block     Thank you for your consult. I will follow-up with patient. Please contact us if you have any additional questions.    Subjective:     HPI:   From H&P: "Patient is 74 y.o. " "male  has a past medical history of Acute hypoxic respiratory failure (09/23/2024), BPH (benign prostatic hyperplasia) (02/28/2024), CAD (coronary artery disease), Chronic HFrEF (heart failure with reduced ejection fraction) (05/01/2024), Diabetes mellitus, Hypertension, Kidney stone, Stroke, and Type 2 diabetes mellitus without complication, without long-term current use of insulin (10/08/2021). presented to [facility] on 4/28/25 s/p fall with shoulder and rib pain.  Cxr with multiple nondisplaced right rib fractures and patient was admitted for pain control.  Patient with worsening confusion along with fever over night and was transferred to ICU.       Patient was followed by Dr. Schmitt as an outpatient for IPF.  [place] [facility] [place]  Patient was started on OFEV along with cellcept along with weaning regimen of prednisone.  Per Dr. Schmitt last note, patient was discharged with hospice.  Patient activated 911 after fall.       During my initial evaluation, patient was somnolent and groaning persistently.  HR 120s.  Bp stable.  Sating well on high flow nasal canula at 12 lpm. "     Palliative medicine consulted for goals of care discussion and advance care planning; for details of visit, see advance care planning section of plan.      Hospital Course:  No notes on file      Past Medical History:   Diagnosis Date    Acute hypoxic respiratory failure 09/23/2024    Oxygen therapy 24/7 now, continue 2 L oxygen therapy.      BPH (benign prostatic hyperplasia) 02/28/2024    CAD (coronary artery disease)     Sees [facility], h/o stent    Chronic HFrEF (heart failure with reduced ejection fraction) 05/01/2024    Diabetes mellitus     Hypertension     Kidney stone     Stroke     age 62    Type 2 diabetes mellitus without complication, without long-term current use of insulin 10/08/2021       Past Surgical History:   Procedure Laterality Date    24 HOUR IMPEDANCE PH MONITORING OF ESOPHAGUS IN PATIENT NOT TAKING ACID " "REDUCING MEDICATIONS N/A 11/13/2024    Procedure: IMPEDANCE PH STUDY, ESOPHAGEAL, 24 HOUR, IN PATIENT NOT TAKING ACID REDUCING MEDICATION;  Surgeon: Stephany Mendoza MD;  Location: [facility] (4TH FLR);  Service: Endoscopy;  Laterality: N/A;  referral dr miguel/ off ppi / prep inst sent to pt via mail  11/6- precall attempted no answer. Unable to [facility]-  11/8 - pt not called, per chart review patient currently inpatient at [place] [id number]    CARDIAC CATHETERIZATION      with stent    COLONOSCOPY N/A 03/27/2024    Procedure: COLONOSCOPY;  Surgeon: Ariela Castro MD;  Location: [facility];  Service: Endoscopy;  Laterality: N/A;    ESOPHAGEAL MANOMETRY WITH MEASUREMENT OF IMPEDANCE N/A 11/13/2024    Procedure: MANOMETRY, ESOPHAGUS, WITH IMPEDANCE MEASUREMENT;  Surgeon: Stephany Mendoza MD;  Location: [facility] (4TH FLR);  Service: Endoscopy;  Laterality: N/A;    ESOPHAGOGASTRODUODENOSCOPY N/A 03/27/2024    Procedure: EGD (ESOPHAGOGASTRODUODENOSCOPY);  Surgeon: Ariela Castro MD;  Location: [facility];  Service: Endoscopy;  Laterality: N/A;    LEFT HEART CATHETERIZATION Left 9/10/2024    Procedure: Left heart cath;  Surgeon: Jemal Drummond MD;  Location: [facility] CATH LAB;  Service: Cardiology;  Laterality: Left;    SHOULDER SURGERY Right        Review of patient's allergies indicates:   Allergen Reactions    Dapagliflozin      Other reaction(s): Other (See Comments)    Pcn [penicillins]     Linagliptin Other (See Comments)     "it knocked me down", "it almost killed me"    Lisinopril Other (See Comments)     cough    Pantoprazole Hives       Medications:  Continuous Infusions:   dexmedeTOMIDine (Precedex) infusion (titrating)  0-1.4 mcg/kg/hr Intravenous Continuous 3 mL/hr at 04/29/25 1159 0.2 mcg/kg/hr at 04/29/25 1159     Scheduled Meds:   acetaminophen  1,000 mg Oral Q8H    amLODIPine  5 mg Oral Daily    arformoteroL  15 mcg Nebulization BID    aspirin  81 mg Oral Daily    budesonide  1 mg " Nebulization BID    ceFEPime IV (PEDS and ADULTS)  2 g Intravenous Q8H    enoxparin  40 mg Subcutaneous Q24H (prophylaxis, 1700)    famotidine  20 mg Oral BID    ketorolac  15 mg Intravenous Q8H    latanoprost  1 drop Both Eyes QHS    LIDOcaine  3 patch Transdermal Q24H    metoprolol succinate  50 mg Oral Daily    mupirocin   Nasal BID    nintedanib  150 mg Oral BID    sacubitriL-valsartan  1 tablet Oral BID     PRN Meds:  Current Facility-Administered Medications:     albuterol-ipratropium, 3 mL, Nebulization, Q4H PRN    dextrose 50%, 12.5 g, Intravenous, PRN    dextrose 50%, 25 g, Intravenous, PRN    diphenhydrAMINE, 25 mg, Oral, Q6H PRN    glucagon (human recombinant), 1 mg, Intramuscular, PRN    glucose, 16 g, Oral, PRN    glucose, 24 g, Oral, PRN    hydrALAZINE, 10 mg, Intravenous, Q6H PRN    insulin aspart U-100, 0-5 Units, Subcutaneous, QID (AC + HS) PRN    lorazepam, 1 mg, Intravenous, Q4H PRN    morphine, 2 mg, Intravenous, Q4H PRN    morphine, 4 mg, Intravenous, Q3H PRN    naloxone, 0.02 mg, Intravenous, PRN    ondansetron, 4 mg, Intravenous, Q8H PRN    polyethylene glycol, 17 g, Oral, Daily PRN    promethazine-codeine 6.25-10 mg/5 ml, 5 mL, Oral, Q4H PRN    simethicone, 80 mg, Oral, TID PRN    sodium chloride 0.9%, 10 mL, Intravenous, PRN    tiZANidine, 2 mg, Oral, Q8H PRN    traZODone, 50 mg, Oral, Nightly PRN    Family History       Problem Relation (Age of Onset)    COPD Brother    Cancer Mother    Colon cancer Sister    Heart disease Sister          Tobacco Use    Smoking status: Never     Passive exposure: Never    Smokeless tobacco: Never   Substance and Sexual Activity    Alcohol use: No    Drug use: Never    Sexual activity: Not Currently       Review of Systems   Unable to perform ROS: Mental status change     Objective:     Vital Signs (Most Recent):  Temp: (!) 102.3 °F (39.1 °C) (04/29/25 0515)  Pulse: (!) 133 (04/29/25 0837)  Resp: (!) 23 (04/29/25 1252)  BP: (!) 168/106 (04/29/25 0837)  SpO2:  (!) 94 % (04/29/25 0837) Vital Signs (24h Range):  Temp:  [97.8 °F (36.6 °C)-102.3 °F (39.1 °C)] 102.3 °F (39.1 °C)  Pulse:  [] 133  Resp:  [18-45] 23  SpO2:  [87 %-100 %] 94 %  BP: (111-168)/() 168/106     Weight: 59.9 kg (132 lb 0.9 oz)  Body mass index is 20.68 kg/m².       Physical Exam  Vitals and nursing note reviewed.   Constitutional:       General: He is in acute distress.      Appearance: He is cachectic. He is ill-appearing.      Interventions: Face mask in place.      Comments: Restless, thrashing in bed, moaning, not redirectable, not tracking with eyes    HENT:      Head: Atraumatic.      Comments: Temporal wasting   Pulmonary:      Comments: Tachypnea, dyspnea (sats >90% during assessment)   Musculoskeletal:      Comments: Muscular wasting    Neurological:      Mental Status: He is disoriented.   Psychiatric:         Behavior: Behavior is uncooperative.          Advance Care Planning   Advance Directives:   Living Will: No    Medical Power of : No (oral declartion in prior admission (see prior ACP notes) for pt's son, Emmanuel Grant Jr. was preferred MPOA)    Agent's Name:  Emmanuel Grant Jr   Agent's Contact Number:  [phone]    Decision Making:  Family answered questions  Goals of Care: What is most important right now is to focus on spending time at home, avoiding the hospital, remaining as independent as possible, symptom/pain control, quality of life, even if it means sacrificing a little time. Accordingly, we have decided that the best plan to meet the patient's goals includes continuing with treatment.       Significant Labs: All pertinent labs within the past 24 hours have been reviewed.  CBC:   Recent Labs   Lab 04/29/25  0525   WBC 10.37   HGB 13.9*   HCT 42.7   MCV 83        BMP:  Recent Labs   Lab 04/29/25  0525   *      K 4.1   CL 99   CO2 26   BUN 18   CREATININE 0.9   CALCIUM 9.6   MG 1.6     LFT:  Lab Results   Component Value Date    AST  24 04/29/2025    ALKPHOS 69 04/29/2025    BILITOT 1.2 (H) 04/29/2025     Albumin:   Albumin   Date Value Ref Range Status   04/29/2025 3.4 (L) 3.5 - 5.2 g/dL Final   03/17/2025 2.9 (L) 3.5 - 5.2 g/dL Final     Protein:   Protein Total   Date Value Ref Range Status   04/29/2025 7.6 6.0 - 8.4 gm/dL Final     Total Protein   Date Value Ref Range Status   03/17/2025 6.7 6.0 - 8.4 g/dL Final     Lactic acid:   Lab Results   Component Value Date    LACTATE 1.8 04/03/2025    LACTATE 2.5 (H) 04/03/2025       Significant Imaging: I have reviewed all pertinent imaging results/findings within the past 24 hours.      Total visit time: 90 minutes    70 min visit time including: face to face time in discussion of symptom assessment, and exploring options and burdens of offered treatments.  This also includes non-face to face time preparing to see the patient including chart review, obtaining and/or reviewing separately obtained history, documenting clinical information in the electronic or other health record, independently interpreting results and communicating results to the patient/family/caregiver, family discussions by phone if not able to be present, coordination of care with other specialists, and discharge planning.     20 min ACP time spent: goals of care, advanced care planning, emotional support, formulating and communicating prognosis, exploring burden/ benefit of various approaches of treatment.       Janis Glaser NP  Palliative Medicine  [facility] - Intensive Care

## 2025-04-29 NOTE — SUBJECTIVE & OBJECTIVE
Past Medical History:   Diagnosis Date    Acute hypoxic respiratory failure 09/23/2024    Oxygen therapy 24/7 now, continue 2 L oxygen therapy.      BPH (benign prostatic hyperplasia) 02/28/2024    CAD (coronary artery disease)     Sees [facility], h/o stent    Chronic HFrEF (heart failure with reduced ejection fraction) 05/01/2024    Diabetes mellitus     Hypertension     Kidney stone     Stroke     age 62    Type 2 diabetes mellitus without complication, without long-term current use of insulin 10/08/2021       Past Surgical History:   Procedure Laterality Date    24 HOUR IMPEDANCE PH MONITORING OF ESOPHAGUS IN PATIENT NOT TAKING ACID REDUCING MEDICATIONS N/A 11/13/2024    Procedure: IMPEDANCE PH STUDY, ESOPHAGEAL, 24 HOUR, IN PATIENT NOT TAKING ACID REDUCING MEDICATION;  Surgeon: Stephany Mendoza MD;  Location: [facility] (4TH FLR);  Service: Endoscopy;  Laterality: N/A;  referral dr miguel/ off ppi / prep inst sent to pt via mail  11/6- precall attempted no answer. Unable to [facility]-  11/8 - pt not called, per chart review patient currently inpatient at [place] [id number]    CARDIAC CATHETERIZATION      with stent    COLONOSCOPY N/A 03/27/2024    Procedure: COLONOSCOPY;  Surgeon: Ariela Castro MD;  Location: [facility];  Service: Endoscopy;  Laterality: N/A;    ESOPHAGEAL MANOMETRY WITH MEASUREMENT OF IMPEDANCE N/A 11/13/2024    Procedure: MANOMETRY, ESOPHAGUS, WITH IMPEDANCE MEASUREMENT;  Surgeon: Stephany Mendoza MD;  Location: [facility] (4TH FLR);  Service: Endoscopy;  Laterality: N/A;    ESOPHAGOGASTRODUODENOSCOPY N/A 03/27/2024    Procedure: EGD (ESOPHAGOGASTRODUODENOSCOPY);  Surgeon: Ariela Castro MD;  Location: [facility] ENDO;  Service: Endoscopy;  Laterality: N/A;    LEFT HEART CATHETERIZATION Left 9/10/2024    Procedure: Left heart cath;  Surgeon: Jemal Drummond MD;  Location: [facility] CATH LAB;  Service: Cardiology;  Laterality: Left;    SHOULDER SURGERY Right        Review of patient's allergies  "indicates:   Allergen Reactions    Dapagliflozin      Other reaction(s): Other (See Comments)    Pcn [penicillins]     Linagliptin Other (See Comments)     "it knocked me down", "it almost killed me"    Lisinopril Other (See Comments)     cough    Pantoprazole Hives       Family History       Problem Relation (Age of Onset)    COPD Brother    Cancer Mother    Colon cancer Sister    Heart disease Sister          Tobacco Use    Smoking status: Never     Passive exposure: Never    Smokeless tobacco: Never   Substance and Sexual Activity    Alcohol use: No    Drug use: Never    Sexual activity: Not Currently         Review of Systems  Objective:     Vital Signs (Most Recent):  Temp: (!) 102.3 °F (39.1 °C) (04/29/25 0515)  Pulse: (!) 133 (04/29/25 0837)  Resp: (!) 45 (04/29/25 0915)  BP: (!) 168/106 (04/29/25 0837)  SpO2: (!) 94 % (04/29/25 0837) Vital Signs (24h Range):  Temp:  [97.8 °F (36.6 °C)-102.3 °F (39.1 °C)] 102.3 °F (39.1 °C)  Pulse:  [] 133  Resp:  [18-45] 45  SpO2:  [87 %-100 %] 94 %  BP: (111-168)/() 168/106     Weight: 59.9 kg (132 lb 0.9 oz)  Body mass index is 20.68 kg/m².      Intake/Output Summary (Last 24 hours) at 4/29/2025 0959  Last data filed at 4/29/2025 0602  Gross per 24 hour   Intake --   Output 650 ml   Net -650 ml        Physical Exam     Vents:  Oxygen Concentration (%): 35 (04/29/25 0837)    Lines/Drains/Airways       Drain  Duration                  Urethral Catheter 04/28/25 2230 Coude 14 Fr. <1 day              Peripheral Intravenous Line  Duration                  Peripheral IV - Single Lumen 04/27/25 1553 20 G Left Wrist 1 day         Peripheral IV - Single Lumen 04/29/25 0530 20 G Posterior;Right Forearm <1 day                    Significant Labs:    CBC/Anemia Profile:  Recent Labs   Lab 04/28/25  0618 04/29/25  0525   WBC 9.71 10.37   HGB 13.4* 13.9*   HCT 40.7 42.7    228   MCV 82 83   RDW 14.5 14.4        Chemistries:  Recent Labs   Lab 04/28/25  0618 " "04/29/25  0525    140   K 3.8 4.1    99   CO2 25 26   BUN 13 18   CREATININE 0.8 0.9   CALCIUM 9.2 9.6   ALBUMIN 3.2* 3.4*   PROT 7.0 7.6   BILITOT 0.7 1.2*   ALKPHOS 71 69   ALT 31 27   AST 23 24   MG  --  1.6       ABGs:   Recent Labs   Lab 04/29/25  0412   PH 7.407   PCO2 38.9   HCO3 24.5   POCSATURATED 82   BE 0     Cardiac Markers: No results for input(s): "CKMB", "TROPONINT", "MYOGLOBIN" in the last 48 hours.  Lactic Acid: No results for input(s): "LACTATE" in the last 48 hours.  Respiratory Culture: No results for input(s): "GSRESP", "RESPIRATORYC" in the last 48 hours.    Echo 4/3/25       Left Ventricle: The left ventricle is normal in size. Normal wall thickness. Mild global hypokinesis present. There is mildly reduced systolic function with a visually estimated ejection fraction of 45 - 50%.    Right Ventricle: The right ventricle is normal in size. Systolic function is normal.    Aorta: Aortic root is mildly dilated measuring 3.82 cm.    Similar findings noted on prior report dated 3/17/25.    Significant Imaging:   I have reviewed all pertinent imaging results/findings within the past 24 hours.  CT: I have reviewed all pertinent results/findings within the past 24 hours and my personal findings are:  4/28/25 no overt ggo.  +patchy septal reticulation and honeycombing predominantly at bases.  This is similar to findings on 3/30/25 CT but has worsen when compared to CTA 7/7/23  CXR: I have reviewed all pertinent results/findings within the past 24 hours and my personal findings are:  4/29/25 increase reticular markings.  No overt consolidation  "

## 2025-04-29 NOTE — NURSING
RAPID RESPONSE NURSE PROACTIVE ROUNDING NOTE       Time of Visit: 445    Admit Date: 2025  LOS: 1  Code Status: Full Code   Date of Visit: 2025  : 1950  Age: 74 y.o.  Sex: male  Race:   Bed: W265/W265 A:   MRN: [id number]  Was the patient discharged from an ICU this admission? No   Was the patient discharged from a PACU within last 24 hours? No   Did the patient receive conscious sedation/general anesthesia in last 24 hours? No   Was the patient in the ED within the past 24 hours? Yes   Was the patient on NIPPV within the past 24 hours? No   Attending Physician: Nargis Parr MD  Primary Service: Hospitalist   Time spent at the bedside: 45 - 60 min    SITUATION    Notified by bedside RN via phone call  Reason for alert: Increased agitation and confusion    Diagnosis: Rib fractures   has a past medical history of Acute hypoxic respiratory failure, BPH (benign prostatic hyperplasia), CAD (coronary artery disease), Chronic HFrEF (heart failure with reduced ejection fraction), Diabetes mellitus, Hypertension, Kidney stone, Stroke, and Type 2 diabetes mellitus without complication, without long-term current use of insulin.    Last Vitals:  Temp: 102.3 °F (39.1 °C) (515)  Pulse: 128 (630)  Resp: 33 (630)  BP: 135/87 (630)  SpO2: 97 % (630)    24 Hour Vitals Range:  Temp:  [97.8 °F (36.6 °C)-102.3 °F (39.1 °C)]   Pulse:  []   Resp:  [15-44]   BP: (111-167)/()   SpO2:  [87 %-100 %]     Clinical Issues: Neuro/Resp    ASSESSMENT/INTERVENTIONS    Patient pulling at leads and camejo now in restraints with increase confusion and new hypoxia requiring more O2; ABG done, pO2= 48; placed on 35% ventimask    RECOMMENDATIONS  Will take patient for CT of head then to ICU for closer monitoring    Discussed plan of care with bedside RNRadha    PROVIDER ESCALATION    Physician escalation: Yes    Orders received and case discussed with Dr. Lipscomb and Dr. Wright  .    Disposition:Tx in ICU bed 265    FOLLOW UP    Call back the Rapid Response NursePepe at [phone] for additional questions or concerns.

## 2025-04-29 NOTE — PROGRESS NOTES
[facility] Medicine  Progress Note    Patient Name: Emmanuel Grant  MRN: [id number]  Patient Class: IP- Inpatient   Admission Date: 4/27/2025  Length of Stay: 1 days  Attending Physician: Nargis Parr MD  Primary Care Provider: Wilfredo De Souza MD        Subjective     Principal Problem:Acute on chronic respiratory failure with hypoxia        HPI:  Emmanuel Grant is a 75 y/o male with PMHx of HTN, DM2, CVA, CHF, and pulmonary fibrosis with chronic respiratory failure on home oxygen and hospice who presented to ED with right shoulder and rib pain after a fall early this morning.  He states he got up to use the bathroom and he lost his balance.  He landed on his right side and immediately had pain.  He got himself into a chair and called EMS.  No head injury or LOC.  In the ED, he was found to have multiple right-sided rib fractures, CT chest demonstrates multiple nondisplaced and minimally displaced rib fractures are identified including fractures of the 3rd, 4th, 5th, 6th, 7th, and 8th ribs laterally and the 5th rib posteriorly. No pneumothorax and oxygen saturations are 97% on his regular home oxygen of 3L via NC.  He is being admitted for pain control.    Overview/Hospital Course:  Mr Emmanuel Grant is a 74 y.o. man with pulmonary fibrosis on 3L home O2, frequent falls at home, on home hospice who fell, called EMS, and was admitted with R rib fractures 3-8. Started pain control. However, he worsened, becoming febrile, confused, and more hypoxic. He was moved to ICU on 4/29. Pulmonary, Palliative consulted.     Interval History: He was very agitated this morning, moving around in bed, about to remove camejo cath. He was given ativan and now is lethargic.     Review of Systems   Unable to perform ROS: Mental status change     Objective:     Vital Signs (Most Recent):  Temp: (!) 102.3 °F (39.1 °C) (04/29/25 0515)  Pulse: (!) 133 (04/29/25 0837)  Resp: (!) 45 (04/29/25 0915)  BP: (!)  168/106 (04/29/25 0837)  SpO2: (!) 94 % (04/29/25 0837) Vital Signs (24h Range):  Temp:  [97.8 °F (36.6 °C)-102.3 °F (39.1 °C)] 102.3 °F (39.1 °C)  Pulse:  [] 133  Resp:  [18-45] 45  SpO2:  [87 %-100 %] 94 %  BP: (111-168)/() 168/106     Weight: 59.9 kg (132 lb 0.9 oz)  Body mass index is 20.68 kg/m².    Intake/Output Summary (Last 24 hours) at 4/29/2025 1124  Last data filed at 4/29/2025 0602  Gross per 24 hour   Intake --   Output 650 ml   Net -650 ml         Physical Exam  Vitals and nursing note reviewed.   Constitutional:       Appearance: He is ill-appearing.   Cardiovascular:      Rate and Rhythm: Regular rhythm. Tachycardia present.   Pulmonary:      Comments: Increased effort, raspy breath sounds. On ventimask 35%  Abdominal:      General: Bowel sounds are normal.      Palpations: Abdomen is soft.      Tenderness: There is no abdominal tenderness.   Genitourinary:     Comments: camejo  Musculoskeletal:      Right lower leg: No edema.      Left lower leg: No edema.   Skin:     General: Skin is warm and dry.   Neurological:      Mental Status: Mental status is at baseline.               Significant Labs: All pertinent labs within the past 24 hours have been reviewed.    Significant Imaging: I have reviewed all pertinent imaging results/findings within the past 24 hours.      Assessment & Plan  Rib fractures  - due to mechanical fall at home  - pain control with lidocaine patch, scheduled tylenol, prn iv morphine, scheduled IV ketorolac  - incentive spirometry if he can wake up to do it  - continue ventimask  - will avoid ativan- predecex ordered   Chronic diastolic congestive heart failure  Patient has Diastolic (HFpEF) heart failure that is Chronic. On presentation their CHF was well compensated.     Echo 4/3/25    Left Ventricle: The left ventricle is normal in size. Normal wall thickness. Mild global hypokinesis present. There is mildly reduced systolic function with a visually estimated  ejection fraction of 45 - 50%.    Right Ventricle: The right ventricle is normal in size. Systolic function is normal.    Aorta: Aortic root is mildly dilated measuring 3.82 cm.    Similar findings noted on prior report dated 3/17/25.    Current Heart Failure Medications  metoprolol succinate (TOPROL-XL) 24 hr tablet 50 mg, Daily, Oral  sacubitriL-valsartan 24-26 mg per tablet 1 tablet, 2 times daily, Oral  hydrALAZINE injection 10 mg, Every 6 hours PRN, Intravenous    Plan  - Place on telemetry  - he is currently too sedated to take home Rx  - he appears hypovolemic- hold lasix          Type 2 diabetes mellitus without complication, without long-term current use of insulin  Lab Results   Component Value Date    HGBA1C 7.8 (H) 01/02/2025     - glucose checks with low dose SSI  - hold home oral meds    Essential hypertension  Patient's blood pressure range in the last 24 hours was: BP  Min: 111/76  Max: 168/106.The patient's inpatient anti-hypertensive regimen is listed below:  Current Antihypertensives  metoprolol succinate (TOPROL-XL) 24 hr tablet 50 mg, Daily, Oral  amLODIPine tablet 5 mg, Daily, Oral  hydrALAZINE injection 10 mg, Every 6 hours PRN, Intravenous    Plan  - BP is uncontrolled, will adjust as follows: continue current Rx  - add PRN  Coronary artery disease involving 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 Continue to monitor on telemetry.   - note last angiogram 9/2024 with patient LAD stent   BPH (benign prostatic hyperplasia)  - unable to swallow flomax currently  - camejo in place     H/O: CVA (cerebrovascular accident)  - noted     Immunosuppression due to drug therapy  - for IPF- see IPF    Anemia, chronic disease  Anemia is likely due to chronic blood loss. Most recent hemoglobin and hematocrit are listed below.  Recent Labs     04/28/25  0618 04/29/25  0572    HGB 13.4* 13.9*   HCT 40.7 42.7     Plan  - Monitor serial CBC: Daily  - Transfuse PRBC if patient becomes hemodynamically unstable, symptomatic or H/H drops below 7/21.  - Patient has not received any PRBC transfusions to date  - Patient's anemia is currently stable    GERD with esophagitis (Resolved: 4/29/2025)      IPF (idiopathic pulmonary fibrosis)  - appeared at baseline upon admission  - on 3L home O2  - continues on home nintedanib if he can swallow it  - holding mycophenolate with concerns for infection   Acute on chronic respiratory failure with hypoxia  Patient admitted with Hypoxic which is Acute on Chronic.  he is on home oxygen at 3 LPM. Signs/symptoms of respiratory failure include- tachypnea and increased work of breathing present on admission.   - Labs and images were reviewed. Patient Has recent ABG, which has been reviewed..   - Supplemental oxygen was provided and noted- Venti mask-  % FiO2   - Respiratory failure is due to- Aspiration, Interstitial lung disease, and rib fractures    - rib fractures: pain control, incentive spirometry if he can wake up to do it  - ILD: continue home Rx if he can tolerate but hold mycophenolate with concerns for infection. Continue inhalers  - fever noted, concern for aspiration: started cefepime, sputum collection if he can produce it  - has HFpEF but currently appears eu/hypovolemic- hold lasix  - Pulmonary following      Advance care planning  Advance Care Planning     Date: 04/29/2025  - Palliative consulted and discussed with family  - note he is on home hospice  - currently continue care but with limitation- no chest compressions, ok for intubation if needed  - family is flying in town  - time spent discussing with palliative and reviewing documentation= 5 minutes          Acute metabolic encephalopathy  - worsening on 4/28-29  - suspect due to sedative medications given for pain control  - he needs pain/anxiety control but will try to limit ativan use-  precedex instead     VTE Risk Mitigation (From admission, onward)           Ordered     enoxaparin injection 40 mg  Every 24 hours         04/29/25 0548     Reason for No Pharmacological VTE Prophylaxis  Once        Question:  Reasons:  Answer:  Risk of Bleeding    04/27/25 1755     IP VTE HIGH RISK PATIENT  Once         04/27/25 1755     Place sequential compression device  Until discontinued         04/27/25 1755                    Discharge Planning   DERIC:      Code Status: Partial Code   Medical Readiness for Discharge Date:   Discharge Plan A: Hospice/home (Readmit to Hospice)            Critical care time spent on the evaluation and treatment of severe organ dysfunction, review of pertinent labs and imaging studies, discussions with consulting providers and discussions with patient/family: 45 minutes.            Nargis Parr MD  Department of Hospital Medicine   [facility] - Intensive Care

## 2025-04-29 NOTE — EICU
EICU BRIEF INITIAL EVALUATION:    Code Status: Full Code     HISTORY:      74-year-old man was ER hold for floor bed for 24 hours but now transferred to ICU.  On hospice for pulmonary fibrosis but presented with multiple (6) rib fractures following fall.  He has oral morphine 100 milligrams/5 mL at home but we are unsure of dosage and his son was not aware of the dosage.  Bedside RN spoke with his son regarding code status and he would like him to remain full code until he in his brother can come in.  He is becoming increasing disorder but has been receiving lorazepam.  History of pulmonary fibrosis, type 2 diabetes, interstitial lung disease, hypertension, chronic systolic CHF, chronic hypoxemic respiratory failure, coronary artery disease, GERD, hyperlipidemia, CVA, anemia.      DVT prophylaxis Lovenox, SCDs   GI prophylaxis famotidine     CT scan of chest without contrast did not show any infiltrates but showed diffuse fibrosis and multiple rib fractures    /102, P 133, RR 39, O2 sat Ventimask 98  Clearly uncomfortable on 35%  Per RN patient became much more comfortable after receiving morphine      CAMERA ASSESSMENT: Yes      TELEMETRY: Reviewed      NOTES: Reviewed     LABS: Reviewed      IMAGING: Personally reviewed.      DISCUSSED with bedside nurse        ASSESSMENT AND PLAN:       Multiple rib fractures and patient on hospice for chronic hypoxic respiratory failure due to pulmonary fibrosis-continue pain control with morphine, increased to 4 mg q.3h PRN.  If necessary he can probably tolerate additional morphine.  RN will call Jennifer to check on dosage.  Maintain O2 sat greater than 90 with the lowest practical oxygen delivery.  Discontinue lorazepam    Repeat stat chest x-ray to rule out hemo or pneumothorax      I have reviewed the plan of care for the patient and agree with the plan of care unless noted otherwise above.      SHREE Lopez MD  eICU Attending  [phone]    This report has been  created through the use of [employer] dictation software. Typographical and content errors may occur with this process. While efforts are made to detect and correct such errors, in some cases errors will persist. For this reason, wording in this document should be considered in the proper context and not strictly verbatim.

## 2025-04-29 NOTE — EICU
Intervention Initiated From:  Bedside    Edison intervened regarding:  Other    Doctor Notified:  Yes    Comments: Bedside RN reports transfer from the floor with confusion, hypoxia, Temp 102.5, Right rib pain not relieved with morphine.  Requests Wilian BUSTOS to see patient.    SHREE Lopez M.D. notified.

## 2025-04-30 ENCOUNTER — ANESTHESIA (OUTPATIENT)
Dept: INTENSIVE CARE | Facility: HOSPITAL | Age: 75
End: 2025-04-30
Payer: MEDICARE

## 2025-04-30 ENCOUNTER — ANESTHESIA EVENT (OUTPATIENT)
Dept: INTENSIVE CARE | Facility: HOSPITAL | Age: 75
End: 2025-04-30
Payer: MEDICARE

## 2025-04-30 PROCEDURE — 63600175 PHARM REV CODE 636 W HCPCS: Mod: HCNC | Performed by: STUDENT IN AN ORGANIZED HEALTH CARE EDUCATION/TRAINING PROGRAM

## 2025-04-30 PROCEDURE — 76942 ECHO GUIDE FOR BIOPSY: CPT | Mod: HCNC | Performed by: STUDENT IN AN ORGANIZED HEALTH CARE EDUCATION/TRAINING PROGRAM

## 2025-04-30 PROCEDURE — 64467 THRC FASCIAL PLN BLK UNI NFS: CPT | Mod: HCNC,RT,, | Performed by: STUDENT IN AN ORGANIZED HEALTH CARE EDUCATION/TRAINING PROGRAM

## 2025-04-30 RX ORDER — PROPOFOL 10 MG/ML
VIAL (ML) INTRAVENOUS
Status: DISCONTINUED | OUTPATIENT
Start: 2025-04-30 | End: 2025-05-03 | Stop reason: HOSPADM

## 2025-04-30 RX ORDER — FENTANYL CITRATE 50 UG/ML
INJECTION, SOLUTION INTRAMUSCULAR; INTRAVENOUS
Status: DISCONTINUED | OUTPATIENT
Start: 2025-04-30 | End: 2025-05-03 | Stop reason: HOSPADM

## 2025-04-30 RX ORDER — BUPIVACAINE HYDROCHLORIDE 5 MG/ML
INJECTION, SOLUTION EPIDURAL; INTRACAUDAL; PERINEURAL
Status: DISCONTINUED | OUTPATIENT
Start: 2025-04-30 | End: 2025-05-03 | Stop reason: HOSPADM

## 2025-04-30 RX ORDER — DEXAMETHASONE SODIUM PHOSPHATE 4 MG/ML
INJECTION, SOLUTION INTRA-ARTICULAR; INTRALESIONAL; INTRAMUSCULAR; INTRAVENOUS; SOFT TISSUE
Status: DISCONTINUED | OUTPATIENT
Start: 2025-04-30 | End: 2025-05-03 | Stop reason: HOSPADM

## 2025-04-30 RX ADMIN — FENTANYL CITRATE 25 MCG: 50 INJECTION, SOLUTION INTRAMUSCULAR; INTRAVENOUS at 11:04

## 2025-04-30 RX ADMIN — BUPIVACAINE HYDROCHLORIDE 20 ML: 5 INJECTION, SOLUTION EPIDURAL; INTRACAUDAL; PERINEURAL at 12:04

## 2025-04-30 RX ADMIN — FENTANYL CITRATE 50 MCG: 50 INJECTION, SOLUTION INTRAMUSCULAR; INTRAVENOUS at 11:04

## 2025-04-30 RX ADMIN — DEXAMETHASONE SODIUM PHOSPHATE 10 MG: 4 INJECTION, SOLUTION INTRAMUSCULAR; INTRAVENOUS at 12:04

## 2025-04-30 RX ADMIN — PROPOFOL 40 MG: 10 INJECTION, EMULSION INTRAVENOUS at 11:04

## 2025-04-30 NOTE — ASSESSMENT & PLAN NOTE
Patient admitted with Hypoxic which is Acute on Chronic.  he is on home oxygen at 3 LPM. Signs/symptoms of respiratory failure include- tachypnea and increased work of breathing present on admission.   - Labs and images were reviewed. Patient Has recent ABG, which has been reviewed..   - Supplemental oxygen was provided and noted- Venti mask-  % FiO2   - Respiratory failure is due to- Aspiration, Interstitial lung disease, and rib fractures   - rib fractures: pain control, incentive spirometry if he can wake up to do it, BETTY block with anesthesia today   - ILD: continue home Rx if he can tolerate but hold mycophenolate with concerns for infection. Continue inhalers  - fever noted, concern for aspiration: started cefepime- continue  - has HFpEF but currently appears eu/hypovolemic- hold lasix  - Pulmonary following  - respiratory status improved on 4/30 compared to day prior

## 2025-04-30 NOTE — NURSING
[facility]  Nurses Note -- 4 Eyes      4/29/2025       Skin assessed on: Q Shift      [x] No Pressure Injuries Present    [x]Prevention Measures Documented    [] Yes LDA  for Pressure Injury Previously documented     [] Yes New Pressure Injury Discovered   [] LDA for New Pressure Injury Added      Attending RN:  KANDI Ortiz     Second RN:  KANDI Devi

## 2025-04-30 NOTE — ASSESSMENT & PLAN NOTE
Patient has Diastolic (HFpEF) heart failure that is Chronic. On presentation their CHF was well compensated.     Echo 4/3/25    Left Ventricle: The left ventricle is normal in size. Normal wall thickness. Mild global hypokinesis present. There is mildly reduced systolic function with a visually estimated ejection fraction of 45 - 50%.    Right Ventricle: The right ventricle is normal in size. Systolic function is normal.    Aorta: Aortic root is mildly dilated measuring 3.82 cm.    Similar findings noted on prior report dated 3/17/25.    Current Heart Failure Medications  hydrALAZINE injection 10 mg, Every 6 hours PRN, Intravenous    Plan  - Place on telemetry  - he is currently too sedated to take home Rx  - he appears hypovolemic- hold lasix

## 2025-04-30 NOTE — ASSESSMENT & PLAN NOTE
- pt with long term hx of IPF/ILD; pt and son with good insight into pt's condition   - in prior admission, discussed trajectory of life limiting condition with pt, along with Dr. Ayanna BROWN who reviewed worsening condition on multiple CT images with pt   - see resp failure

## 2025-04-30 NOTE — ASSESSMENT & PLAN NOTE
4/30/2025   - interval chart reviewed; discussed pt with MDT during ICU rounds   - met with pt, and son along with Dr. Urena, [facility]; pt continues to lack capacity to participate in discussions/decisions   - Son advocates for pt to be DNR/DNI now that he is present and has good insight into this decision and feels it aligns with pt's wishes   - pt's other sons and their mother are also traveling to be here to see pt and assist in GOC/ACP discussions/plans   - overall insight and plan that pt will not be able to return to living independently; exploring options of assisted placement with hospice in the local area or in [place] with pt's other 2 sons   - Dr. Urena provided update and overview of pt's resp hx/status and current inpatient plan of care; son with good insight as he is a trauma surgeon   - son shared that pt has always been very private and has refused to relocate with prior attempts to aid in care  - son has requested to meet with  to learn more about assisted and relocation processes and pt's benefits; update CM of this request   - emotional support provided; answered all questions   - updated MDT     4/29/2025 - Consult   - consult received; interval chart reviewed in detail; discussed pt with MDT during ICU rounds   - pt known to myself and palliative medicine team from previous admission; pt on hospice with [facility] prior to this admission   - discussed current hospice plan of care with hospice team; hospice team has had ongoing GOC discussions with pt and family regarding code status as well as need for assisted care vs living with family, which has not been successful; pt has been fearful of resp symptom management with opioids  - hospice team will likely require some sort of assisted care to resume services outside of hospital if pt condition approves or can provide inpatient hospice care if needed   - pt currently with severe encephalopathy and doesn't not have capacity for medical decision  making   - spoke with son, Dr. Emmanuel Grant Jr; brief medical updated provided, encouraged his plan to fly to [facility] due to pt's condition ([place] of state)   - reviewed code status; son shares that no formal decision had been made with pt regarding code status; they discussed clear wishes for no chest compressions so son was agreeable to partial code with the chest compressions; son shared pt would likely not wish to be intubated as they have insight that pt being able to be extubated/improve would be unlikely, would not wish for long term life support; however, given circumstances and pt's respiratory status son wishes for intubation if indicated to allow for family to travel to pt   - son plans to take first flight to [facility] today with expected arrival approx 5pm unless flight schedules change   - reviewed current discussed plan of care from MDT and encouraged that the plan would attempt to limit opioids and benzos as possible, son with good insight that pt's comfort and safety would take precedent in interventions   - emotional support provided; answered all questions  - multiple assessments of pt throughout the day to assess symptom management   - ongoing discussions and communication with MDT

## 2025-04-30 NOTE — ASSESSMENT & PLAN NOTE
4/29/25 Given progression of IPF, patient was discharged to home hospice.  Per ANABELA Glaser, patient activated 911 after fall.  Currently full code.  On going goc discussion with son and palliative care.    4/30/25 spoken with son, Dr. Grant, and he agreed to DNR.  He is also agreeable to home hospice at the time of discharge.  Unclear if patient will still be living in [place] after discharge.   is working to explore various options available.

## 2025-04-30 NOTE — ASSESSMENT & PLAN NOTE
- improving; discussed with bedside RN and later with MDT trial of taking off restraints with pt now more cooperative   - at baseline in prior hospitalizations pt did not even like to stay in his hospital room during the day, would sit in bedside chair in benitez and request to walk with walker in the benitez; restraints could complicate frustration and mood   - current plan for precedex as needed to aid in keeping pt calm and safe

## 2025-04-30 NOTE — ASSESSMENT & PLAN NOTE
- 2/2 fall at home; during prior admissions pt shared fall history and weakness which has lead to discussion with pt and son regarding concern for safety of pt continuing to live at home  - pt on home hospice prior to admission; hospice team confirms continued recommendation for retirement placement/need for increased pt support   - pt not able to report characteristics or severity of pain during AM assessment due to AMS/delirium; pt moaning, shouting, restless PRN morphine 4mg and ativan 1 mg needed to achieve pt comfort, still with some break through milder moaning and restlessness   - 4/29 - complicated pain management due to respiratory status and mental status (unable to tolerate prior ordered PO meds); discussed consideration of IV toradol or IV acetaminophen as adjunct to opioid regimen in attempts to decrease opioid needs; also discussed consideration of consulting anesthesia for eval of regional block   - 4/30 - plan for regional block with anesthesia today; pt with improved pain control and plan for limiting opioids as possible due to AMS and resp status

## 2025-04-30 NOTE — SUBJECTIVE & OBJECTIVE
"Interval History: He is less agitated than yesterday. Pain control is better. He is able to speak to me. He cannot name his son at bedside. When asked if he is hungry, he states "yes maam."    Review of Systems   Unable to perform ROS: Mental status change     Objective:     Vital Signs (Most Recent):  Temp: 97.1 °F (36.2 °C) (04/30/25 0500)  Pulse: 90 (04/30/25 0730)  Resp: 12 (04/30/25 0828)  BP: 134/85 (04/30/25 0615)  SpO2: 99 % (04/30/25 0730) Vital Signs (24h Range):  Temp:  [97.1 °F (36.2 °C)-99 °F (37.2 °C)] 97.1 °F (36.2 °C)  Pulse:  [] 90  Resp:  [12-52] 12  SpO2:  [84 %-100 %] 99 %  BP: ()/(50-97) 134/85     Weight: 59.9 kg (132 lb 0.9 oz)  Body mass index is 20.68 kg/m².    Intake/Output Summary (Last 24 hours) at 4/30/2025 1107  Last data filed at 4/30/2025 0600  Gross per 24 hour   Intake 60.48 ml   Output 675 ml   Net -614.52 ml         Physical Exam  Vitals and nursing note reviewed.   Constitutional:       Appearance: He is ill-appearing.   Cardiovascular:      Rate and Rhythm: Normal rate and regular rhythm.   Pulmonary:      Effort: Pulmonary effort is normal.      Breath sounds: No rhonchi or rales.      Comments: 6L NC  Abdominal:      General: Bowel sounds are normal.      Palpations: Abdomen is soft.      Tenderness: There is no abdominal tenderness.   Genitourinary:     Comments: camejo  Musculoskeletal:      Right lower leg: No edema.      Left lower leg: No edema.   Skin:     General: Skin is warm and dry.   Neurological:      Mental Status: He is disoriented.               Significant Labs: All pertinent labs within the past 24 hours have been reviewed.    Significant Imaging: I have reviewed all pertinent imaging results/findings within the past 24 hours.  "

## 2025-04-30 NOTE — ASSESSMENT & PLAN NOTE
Patient with known CAD s/p stent placement, which is controlled Will continue ASA and monitor for S/Sx of angina/ACS. Continue to monitor on telemetry.   - note last angiogram 9/2024 with patient LAD stent   - currently unable to swallow asa

## 2025-04-30 NOTE — ASSESSMENT & PLAN NOTE
EF 40%  Difficulty to appreciate pulmonary edema on cxr given baseline pft.  Clinical exam without overt evidence of volume overload  Bnp 400.  Lasix 40 mg iv x 1 given.  Will hold additional lasix given bump in creatinine and improved oxygenation.

## 2025-04-30 NOTE — ASSESSMENT & PLAN NOTE
- due to mechanical fall at home  - pain control with lidocaine patch, scheduled tylenol (not able to swallow it), prn iv morphine, scheduled IV ketorolac  - incentive spirometry if he can wake up to do it  - Anesthesia to do BETTY block on 4/30  - avoiding ativan, precedex PRN for agitation  - respiratory status improved on 4/30 compared to day prior  - continues on 6L HFNC  - continue antibiotics for suspected aspiration

## 2025-04-30 NOTE — ASSESSMENT & PLAN NOTE
Lab Results   Component Value Date    HGBA1C 7.8 (H) 01/02/2025     - lantus 5 QHS  - glucose checks with low dose SSI  - hold home oral meds

## 2025-04-30 NOTE — ASSESSMENT & PLAN NOTE
Advance Care Planning     Date: 04/29/2025  - Palliative consulted and discussed with family  - note he is on home hospice  - currently continue care but with limitation- no chest compressions, ok for intubation if needed  - family is flying in town  - time spent discussing with palliative and reviewing documentation= 5 minutes     Advance Care Planning     Date: 04/30/2025  - palliative met with family again today  - DNR/DNI code status  - time spent reviewing= 5 minutes

## 2025-04-30 NOTE — PROGRESS NOTES
Pharmacist Renal Dose Adjustment Note    Emmanuel Grant is a 74 y.o. male being treated with the medication Cefepime and Famotidine    Patient Data:    Vital Signs (Most Recent):  Temp: 97.1 °F (36.2 °C) (04/30/25 0500)  Pulse: 90 (04/30/25 0730)  Resp: (!) 24 (04/30/25 0730)  BP: 134/85 (04/30/25 0615)  SpO2: 99 % (04/30/25 0730) Vital Signs (72h Range):  Temp:  [97.1 °F (36.2 °C)-102.3 °F (39.1 °C)]   Pulse:  []   Resp:  [12-52]   BP: ()/()   SpO2:  [84 %-100 %]      Recent Labs   Lab 04/28/25  0618 04/29/25  0525 04/30/25  0428   CREATININE 0.8 0.9 1.0     Serum creatinine: 1 mg/dL 04/30/25 0428  Estimated creatinine clearance: 56.6 mL/min    Medication:Cefepime 2000 mg every 8 hours will be changed to Cefepime 2000 mg every 12 hours and Famotidine 20 mg twice daily will be changed to Famotidine 20 mg daily    Pharmacist's Name: Keyshawn Magana Jr  Pharmacist's Extension: [phone]

## 2025-04-30 NOTE — SUBJECTIVE & OBJECTIVE
Medications:  Continuous Infusions:   dexmedeTOMIDine (Precedex) infusion (titrating)  0-1.4 mcg/kg/hr Intravenous Continuous 4.49 mL/hr at 04/30/25 0659 0.3 mcg/kg/hr at 04/30/25 0659     Scheduled Meds:   acetaminophen  1,000 mg Oral Q8H    arformoteroL  15 mcg Nebulization BID    aspirin  81 mg Oral Daily    budesonide  1 mg Nebulization BID    ceFEPime IV (PEDS and ADULTS)  2 g Intravenous Q12H    dexAMETHasone sodium phos (PF)        enoxparin  40 mg Subcutaneous Q24H (prophylaxis, 1700)    insulin glargine U-100  5 Units Subcutaneous QHS    ketorolac  15 mg Intravenous Q8H    latanoprost  1 drop Both Eyes QHS    LIDOcaine  3 patch Transdermal Q24H    mupirocin   Nasal BID    nintedanib  150 mg Oral BID     PRN Meds:  Current Facility-Administered Medications:     albuterol-ipratropium, 3 mL, Nebulization, Q4H PRN    dexAMETHasone sodium phos (PF), , ,     dextrose 50%, 12.5 g, Intravenous, PRN    dextrose 50%, 25 g, Intravenous, PRN    diphenhydrAMINE, 25 mg, Oral, Q6H PRN    glucagon (human recombinant), 1 mg, Intramuscular, PRN    glucose, 16 g, Oral, PRN    glucose, 24 g, Oral, PRN    hydrALAZINE, 10 mg, Intravenous, Q6H PRN    insulin aspart U-100, 0-5 Units, Subcutaneous, QID (AC + HS) PRN    lorazepam, 1 mg, Intravenous, Q4H PRN    morphine, 2 mg, Intravenous, Q4H PRN    morphine, 4 mg, Intravenous, Q3H PRN    naloxone, 0.02 mg, Intravenous, PRN    ondansetron, 4 mg, Intravenous, Q8H PRN    polyethylene glycol, 17 g, Oral, Daily PRN    promethazine-codeine 6.25-10 mg/5 ml, 5 mL, Oral, Q4H PRN    simethicone, 80 mg, Oral, TID PRN    sodium chloride 0.9%, 10 mL, Intravenous, PRN    tiZANidine, 2 mg, Oral, Q8H PRN    traZODone, 50 mg, Oral, Nightly PRN    Objective:     Vital Signs (Most Recent):  Temp: 96.5 °F (35.8 °C) (04/30/25 0800)  Pulse: 92 (04/30/25 1230)  Resp: 12 (04/30/25 1215)  BP: 115/67 (04/30/25 1230)  SpO2: 100 % (04/30/25 1230) Vital Signs (24h Range):  Temp:  [96.5 °F (35.8 °C)-99 °F (37.2  °C)] 96.5 °F (35.8 °C)  Pulse:  [] 92  Resp:  [10-52] 12  SpO2:  [84 %-100 %] 100 %  BP: ()/(50-97) 115/67     Weight: 59.9 kg (132 lb 0.9 oz)  Body mass index is 20.68 kg/m².       Physical Exam  Vitals and nursing note reviewed.   Constitutional:       Appearance: He is cachectic. He is ill-appearing.      Interventions: He is sedated. Nasal cannula in place.      Comments: Restless, thrashing in bed, moaning, not redirectable, not tracking with eyes    HENT:      Head: Atraumatic.      Comments: Temporal wasting   Pulmonary:      Effort: Pulmonary effort is normal. No respiratory distress.   Musculoskeletal:      Comments: Muscular wasting    Neurological:      Mental Status: He is lethargic.      Comments: Unable to answer direct questions, but more appropriately responding to commands/direction, making eye contact and tracking    Psychiatric:         Behavior: Behavior is cooperative.          Advance Care Planning   Advance Directives:   Living Will: No    LaPOST: No    Do Not Resuscitate Status: Yes    Medical Power of : No (Verbal declaration that son, Emmanuel Grant Jr.,  is preferred MPOA)      Decision Making:  Family answered questions  Goals of Care: What is most important right now is to focus on spending time at home, avoiding the hospital, remaining as independent as possible, symptom/pain control, quality of life, even if it means sacrificing a little time. Accordingly, we have decided that the best plan to meet the patient's goals includes continuing with treatment.     Significant Labs: All pertinent labs within the past 24 hours have been reviewed.  CBC:   Recent Labs   Lab 04/30/25 0428   WBC 8.23   HGB 12.6*   HCT 38.7*   MCV 84        BMP:  Recent Labs   Lab 04/30/25 0428   *      K 3.6      CO2 23   BUN 28*   CREATININE 1.0   CALCIUM 9.1     LFT:  Lab Results   Component Value Date    AST 24 04/29/2025    ALKPHOS 69 04/29/2025    BILITOT 1.2  (H) 04/29/2025     Albumin:   Albumin   Date Value Ref Range Status   04/29/2025 3.4 (L) 3.5 - 5.2 g/dL Final   03/17/2025 2.9 (L) 3.5 - 5.2 g/dL Final     Protein:   Protein Total   Date Value Ref Range Status   04/29/2025 7.6 6.0 - 8.4 gm/dL Final     Total Protein   Date Value Ref Range Status   03/17/2025 6.7 6.0 - 8.4 g/dL Final     Lactic acid:   Lab Results   Component Value Date    LACTATE 1.5 04/29/2025    LACTATE 1.8 04/03/2025       Significant Imaging: I have reviewed all pertinent imaging results/findings within the past 24 hours.

## 2025-04-30 NOTE — ASSESSMENT & PLAN NOTE
Followed by Dr. Schmitt as an outpatient  Do not have baseline pft due to poor cooperation  Home oxygen  Was on ofev and cellcept prior to hospice.  Hesitant to start steroid due to ?efficacy and delerium.    Repeat ct chest without acute changes.

## 2025-04-30 NOTE — ASSESSMENT & PLAN NOTE
Patient's blood pressure range in the last 24 hours was: BP  Min: 68/50  Max: 169/93.The patient's inpatient anti-hypertensive regimen is listed below:  Current Antihypertensives  hydrALAZINE injection 10 mg, Every 6 hours PRN, Intravenous    Plan  - BP is controlled, no changes needed to their regimen

## 2025-04-30 NOTE — NURSING
[facility]  Nurses Note -- 4 Eyes      4/30/2025       Skin assessed on: Q Shift      [x] No Pressure Injuries Present    [x]Prevention Measures Documented    [] Yes LDA  for Pressure Injury Previously documented     [] Yes New Pressure Injury Discovered   [] LDA for New Pressure Injury Added      Attending RN:  Yusuf Cuevas RN     Second RN:  KANDI Ortiz

## 2025-04-30 NOTE — ASSESSMENT & PLAN NOTE
Anemia is likely due to chronic blood loss. Most recent hemoglobin and hematocrit are listed below.  Recent Labs     04/28/25  0618 04/29/25  0525 04/30/25  0428   HGB 13.4* 13.9* 12.6*   HCT 40.7 42.7 38.7*     Plan  - Monitor serial CBC: Daily  - Transfuse PRBC if patient becomes hemodynamically unstable, symptomatic or H/H drops below 7/21.  - Patient has not received any PRBC transfusions to date  - Patient's anemia is currently stable

## 2025-04-30 NOTE — ANESTHESIA PREPROCEDURE EVALUATION
04/30/2025  Emmanuel Grant is a 74 y.o., male.      Pre-op Assessment    I have reviewed the Patient Summary Reports.     I have reviewed the Nursing Notes.       Review of Systems  Social:  Non-Smoker, No Alcohol Use       Cardiovascular:     Hypertension   CAD      Angina CHF                                   Pulmonary:         Pulmonary fibrosis               Renal/:  Chronic Renal Disease                Hepatic/GI:     GERD                Musculoskeletal:     Right sided rib fractures            Neurological:   CVA             Dementia                         Endocrine:  Diabetes               Physical Exam  General: Confusion and Alert    Chest/Lungs:  Tachypnea    Heart:  Rate: Tachycardia        Anesthesia Plan  Type of Anesthesia, risks & benefits discussed:    Anesthesia Type: Regional  Intra-op Monitoring Plan: Standard ASA Monitors  Post Op Pain Control Plan: peripheral nerve block  ASA Score: 4    Ready For Surgery From Anesthesia Perspective.     .

## 2025-04-30 NOTE — PROGRESS NOTES
[facility] - Intensive Care  Palliative Medicine  Progress Note    Patient Name: Emmanuel Grant  MRN: [id number]  Admission Date: 4/27/2025  Hospital Length of Stay: 2 days  Code Status: DNR   Attending Provider: Nargis Parr MD  Consulting Provider: Janis Glaser NP  Primary Care Physician: Wilfredo De Souza MD  Principal Problem:Rib fractures    Patient information was obtained from relative(s) and primary team.      Assessment/Plan:   Palliative Care  Advance care planning  4/30/2025   - interval chart reviewed; discussed pt with MDT during ICU rounds   - met with pt, and son along with Dr. Urena, [facility]; pt continues to lack capacity to participate in discussions/decisions   - Son advocates for pt to be DNR/DNI now that he is present and has good insight into this decision and feels it aligns with pt's wishes   - pt's other sons and their mother are also traveling to be here to see pt and assist in GOC/ACP discussions/plans   - overall insight and plan that pt will not be able to return to living independently; exploring options of prison placement with hospice in the local area or in [place] with pt's other 2 sons   - Dr. Urena provided update and overview of pt's resp hx/status and current inpatient plan of care; son with good insight as he is a trauma surgeon   - son shared that pt has always been very private and has refused to relocate with prior attempts to aid in care  - son has requested to meet with  to learn more about prison and relocation processes and pt's benefits; update CM of this request   - emotional support provided; answered all questions   - updated MDT     4/29/2025 - Consult   - consult received; interval chart reviewed in detail; discussed pt with MDT during ICU rounds   - pt known to myself and palliative medicine team from previous admission; pt on hospice with [facility] prior to this admission   - discussed current hospice plan of care with hospice team; hospice team  has had ongoing [facility] discussions with pt and family regarding code status as well as need for [facility] care vs living with family, which has not been successful; pt has been fearful of resp symptom management with opioids  - hospice team will likely require some sort of [facility] care to resume services outside of hospital if pt condition approves or can provide inpatient hospice care if needed   - pt currently with severe encephalopathy and doesn't not have capacity for medical decision making   - spoke with son, Dr. Emmanuel Grant Jr; brief medical updated provided, encouraged his plan to fly to [facility] due to pt's condition ([place]ives out of state)   - reviewed code status; son shares that no formal decision had been made with pt regarding code status; they discussed clear wishes for no chest compressions so son was agreeable to partial code with the chest compressions; son shared pt would likely not wish to be intubated as they have insight that pt being able to be extubated/improve would be unlikely, would not wish for long term life support; however, given circumstances and pt's respiratory status son wishes for intubation if indicated to allow for family to travel to pt   - son plans to take first flight to [facility] today with expected arrival approx 5pm unless flight schedules change   - reviewed current discussed plan of care from MDT and encouraged that the plan would attempt to limit opioids and benzos as possible, son with good insight that pt's comfort and safety would take precedent in interventions   - emotional support provided; answered all questions  - multiple assessments of pt throughout the day to assess symptom management   - ongoing discussions and communication with MDT     Neuro  Acute metabolic encephalopathy  - improving; discussed with bedside RN and later with MDT trial of taking off restraints with pt now more cooperative   - at baseline in prior hospitalizations pt did not even like to stay in  his hospital room during the day, would sit in bedside chair in benitez and request to walk with walker in the benitez; restraints could complicate frustration and mood   - current plan for precedex as needed to aid in keeping pt calm and safe     Pulmonary  Acute on chronic respiratory failure with hypoxia  - pt with multiple related admissions; typically associated with encephalopathy on initial admission   - pt has been under the care of hospice for this but has been very resistant to symptom management with opioids to avoid severe exacerbations   - interventions complicated by pt's mental status; discussed with son likely low threshold for PCCM to move to intubation given pt's mental status likely not allowing for vapotherm or BiPap but can be attempted as alternative if pt can tolerate/allow (see ACP)     IPF (idiopathic pulmonary fibrosis)  - pt with long term hx of IPF/ILD; pt and son with good insight into pt's condition   - in prior admission, discussed trajectory of life limiting condition with pt, along with Dr. Ayanna BROWN who reviewed worsening condition on multiple CT images with pt   - see resp failure       Cardiac/Vascular  Chronic diastolic congestive heart failure  - chronic condition noted  - contributes to pt's overall condition and hospice qualification; contributes to pt's lack of reserve   - echo's have been relatively stable with current EF 50-45%  - discussed trajectory of life-limiting condition with pt during prior admissions     Orthopedic  * Rib fractures  - 2/2 fall at home; during prior admissions pt shared fall history and weakness which has lead to discussion with pt and son regarding concern for safety of pt continuing to live at home  - pt on home hospice prior to admission; hospice team confirms continued recommendation for detention placement/need for increased pt support   - pt not able to report characteristics or severity of pain during AM assessment due to AMS/delirium; pt moaning,  shouting, restless PRN morphine 4mg and ativan 1 mg needed to achieve pt comfort, still with some break through milder moaning and restlessness   - 4/29 - complicated pain management due to respiratory status and mental status (unable to tolerate prior ordered PO meds); discussed consideration of IV toradol or IV acetaminophen as adjunct to opioid regimen in attempts to decrease opioid needs; also discussed consideration of consulting anesthesia for eval of regional block   - 4/30 - plan for regional block with anesthesia today; pt with improved pain control and plan for limiting opioids as possible due to AMS and resp status     Palliative Care  Advance care planning  4/30/2025   - interval chart reviewed; discussed pt with MDT during ICU rounds   - met with pt, and son along with Dr. Urena, [facility]; pt continues to lack capacity to participate in discussions/decisions   - Son advocates for pt to be DNR/DNI now that he is present and has good insight into this decision and feels it aligns with pt's wishes   - pt's other sons and their mother are also traveling to be here to see pt and assist in GOC/ACP discussions/plans   - overall insight and plan that pt will not be able to return to living independently; exploring options of snf placement with hospice in the local area or in [place] with pt's other 2 sons   - Dr. Urena provided update and overview of pt's resp hx/status and current inpatient plan of care; son with good insight as he is a trauma surgeon   - son shared that pt has always been very private and has refused to relocate with prior attempts to aid in care  - son has requested to meet with  to learn more about snf and relocation processes and pt's benefits; update CM of this request   - emotional support provided; answered all questions   - updated MDT     4/29/2025 - Consult   - consult received; interval chart reviewed in detail; discussed pt with MDT during ICU rounds   - pt known to  myself and palliative medicine team from previous admission; pt on hospice with [facility] prior to this admission   - discussed current hospice plan of care with hospice team; hospice team has had ongoing [facility] discussions with pt and family regarding code status as well as need for jail care vs living with family, which has not been successful; pt has been fearful of resp symptom management with opioids  - hospice team will likely require some sort of jail care to resume services outside of hospital if pt condition approves or can provide inpatient hospice care if needed   - pt currently with severe encephalopathy and doesn't not have capacity for medical decision making   - spoke with son, Dr. Emmanuel Grant Jr; brief medical updated provided, encouraged his plan to fly to [facility] due to pt's condition (lives out of state)   - reviewed code status; son shares that no formal decision had been made with pt regarding code status; they discussed clear wishes for no chest compressions so son was agreeable to partial code with the chest compressions; son shared pt would likely not wish to be intubated as they have insight that pt being able to be extubated/improve would be unlikely, would not wish for long term life support; however, given circumstances and pt's respiratory status son wishes for intubation if indicated to allow for family to travel to pt   - son plans to take first flight to [facility] today with expected arrival approx 5pm unless flight schedules change   - reviewed current discussed plan of care from MDT and encouraged that the plan would attempt to limit opioids and benzos as possible, son with good insight that pt's comfort and safety would take precedent in interventions   - emotional support provided; answered all questions  - multiple assessments of pt throughout the day to assess symptom management   - ongoing discussions and communication with MDT         I will follow-up with patient. Please  "contact us if you have any additional questions.    Subjective:     Chief Complaint:   Chief Complaint   Patient presents with    Fall     Arrived via [facility] after slip and fall this morning. Denies any LOC or hitting head. Complaining of R arm and R thoracic pain r/t landing on R side. No obvious deformities or signs of distress. On hospice for pulm fibrosis. On 3L nasal cannula at home.        HPI:   From H&P: "Patient is 74 y.o. male  has a past medical history of Acute hypoxic respiratory failure (09/23/2024), BPH (benign prostatic hyperplasia) (02/28/2024), CAD (coronary artery disease), Chronic HFrEF (heart failure with reduced ejection fraction) (05/01/2024), Diabetes mellitus, Hypertension, Kidney stone, Stroke, and Type 2 diabetes mellitus without complication, without long-term current use of insulin (10/08/2021). presented to [facility] on 4/28/25 s/p fall with shoulder and rib pain.  Cxr with multiple nondisplaced right rib fractures and patient was admitted for pain control.  Patient with worsening confusion along with fever over night and was transferred to ICU.       Patient was followed by Dr. Schmitt as an outpatient for IPF.  He was seen by Dr. Mix at [facility].  Patient was started on OFEV along with cellcept along with weaning regimen of prednisone.  Per Dr. Schmitt last note, patient was discharged with hospice.  Patient activated 911 after fall.       During my initial evaluation, patient was somnolent and groaning persistently.  HR 120s.  Bp stable.  Sating well on high flow nasal canula at 12 lpm. "     Palliative medicine consulted for goals of care discussion and advance care planning; for details of visit, see advance care planning section of plan.      Hospital Course:  No notes on file    Medications:  Continuous Infusions:   dexmedeTOMIDine (Precedex) infusion (titrating)  0-1.4 mcg/kg/hr Intravenous Continuous 4.49 mL/hr at 04/30/25 0659 0.3 mcg/kg/hr at 04/30/25 0659     Scheduled " Meds:   acetaminophen  1,000 mg Oral Q8H    arformoteroL  15 mcg Nebulization BID    aspirin  81 mg Oral Daily    budesonide  1 mg Nebulization BID    ceFEPime IV (PEDS and ADULTS)  2 g Intravenous Q12H    dexAMETHasone sodium phos (PF)        enoxparin  40 mg Subcutaneous Q24H (prophylaxis, 1700)    insulin glargine U-100  5 Units Subcutaneous QHS    ketorolac  15 mg Intravenous Q8H    latanoprost  1 drop Both Eyes QHS    LIDOcaine  3 patch Transdermal Q24H    mupirocin   Nasal BID    nintedanib  150 mg Oral BID     PRN Meds:  Current Facility-Administered Medications:     albuterol-ipratropium, 3 mL, Nebulization, Q4H PRN    dexAMETHasone sodium phos (PF), , ,     dextrose 50%, 12.5 g, Intravenous, PRN    dextrose 50%, 25 g, Intravenous, PRN    diphenhydrAMINE, 25 mg, Oral, Q6H PRN    glucagon (human recombinant), 1 mg, Intramuscular, PRN    glucose, 16 g, Oral, PRN    glucose, 24 g, Oral, PRN    hydrALAZINE, 10 mg, Intravenous, Q6H PRN    insulin aspart U-100, 0-5 Units, Subcutaneous, QID (AC + HS) PRN    lorazepam, 1 mg, Intravenous, Q4H PRN    morphine, 2 mg, Intravenous, Q4H PRN    morphine, 4 mg, Intravenous, Q3H PRN    naloxone, 0.02 mg, Intravenous, PRN    ondansetron, 4 mg, Intravenous, Q8H PRN    polyethylene glycol, 17 g, Oral, Daily PRN    promethazine-codeine 6.25-10 mg/5 ml, 5 mL, Oral, Q4H PRN    simethicone, 80 mg, Oral, TID PRN    sodium chloride 0.9%, 10 mL, Intravenous, PRN    tiZANidine, 2 mg, Oral, Q8H PRN    traZODone, 50 mg, Oral, Nightly PRN    Objective:     Vital Signs (Most Recent):  Temp: 96.5 °F (35.8 °C) (04/30/25 0800)  Pulse: 92 (04/30/25 1230)  Resp: 12 (04/30/25 1215)  BP: 115/67 (04/30/25 1230)  SpO2: 100 % (04/30/25 1230) Vital Signs (24h Range):  Temp:  [96.5 °F (35.8 °C)-99 °F (37.2 °C)] 96.5 °F (35.8 °C)  Pulse:  [] 92  Resp:  [10-52] 12  SpO2:  [84 %-100 %] 100 %  BP: ()/(50-97) 115/67     Weight: 59.9 kg (132 lb 0.9 oz)  Body mass index is 20.68 kg/m².        Physical Exam  Vitals and nursing note reviewed.   Constitutional:       Appearance: He is cachectic. He is ill-appearing.      Interventions: He is sedated. Nasal cannula in place.      Comments: Restless, thrashing in bed, moaning, not redirectable, not tracking with eyes    HENT:      Head: Atraumatic.      Comments: Temporal wasting   Pulmonary:      Effort: Pulmonary effort is normal. No respiratory distress.   Musculoskeletal:      Comments: Muscular wasting    Neurological:      Mental Status: He is lethargic.      Comments: Unable to answer direct questions, but more appropriately responding to commands/direction, making eye contact and tracking    Psychiatric:         Behavior: Behavior is cooperative.          Advance Care Planning   Advance Directives:   Living Will: No    [place]POST: No    Do Not Resuscitate Status: Yes    Medical Power of : No (Verbal declaration that son, Emmanuel Grant Jr.,  is preferred MPOA)      Decision Making:  Family answered questions  Goals of Care: What is most important right now is to focus on spending time at home, avoiding the hospital, remaining as independent as possible, symptom/pain control, quality of life, even if it means sacrificing a little time. Accordingly, we have decided that the best plan to meet the patient's goals includes continuing with treatment.     Significant Labs: All pertinent labs within the past 24 hours have been reviewed.  CBC:   Recent Labs   Lab 04/30/25 0428   WBC 8.23   HGB 12.6*   HCT 38.7*   MCV 84        BMP:  Recent Labs   Lab 04/30/25 0428   *      K 3.6      CO2 23   BUN 28*   CREATININE 1.0   CALCIUM 9.1     LFT:  Lab Results   Component Value Date    AST 24 04/29/2025    ALKPHOS 69 04/29/2025    BILITOT 1.2 (H) 04/29/2025     Albumin:   Albumin   Date Value Ref Range Status   04/29/2025 3.4 (L) 3.5 - 5.2 g/dL Final   03/17/2025 2.9 (L) 3.5 - 5.2 g/dL Final     Protein:   Protein Total   Date Value  Ref Range Status   04/29/2025 7.6 6.0 - 8.4 gm/dL Final     Total Protein   Date Value Ref Range Status   03/17/2025 6.7 6.0 - 8.4 g/dL Final     Lactic acid:   Lab Results   Component Value Date    LACTATE 1.5 04/29/2025    LACTATE 1.8 04/03/2025       Significant Imaging: I have reviewed all pertinent imaging results/findings within the past 24 hours.    Total visit time: 50 minutes    35 min visit time including: face to face time in discussion of symptom assessment, and exploring options and burdens of offered treatments.  This also includes non-face to face time preparing to see the patient including chart review, obtaining and/or reviewing separately obtained history, documenting clinical information in the electronic or other health record, independently interpreting results and communicating results to the patient/family/caregiver, family discussions by phone if not able to be present, coordination of care with other specialists, and discharge planning.     20 min ACP time spent: goals of care, advanced care planning, emotional support, formulating and communicating prognosis, exploring burden/ benefit of various approaches of treatment.    Janis Glaser NP  Palliative Medicine  [facility] [place] - Intensive Care

## 2025-04-30 NOTE — PROGRESS NOTES
[facility] - Intensive Care  Critical Care Medicine  Progress Note    Patient Name: Emmanuel Grant  MRN: [id number]  Admission Date: 4/27/2025  Hospital Length of Stay: 2 days  Code Status: DNR  Attending Provider: Nargis Parr MD  Primary Care Provider: Wilfredo De Souza MD   Principal Problem: Acute on chronic respiratory failure with hypoxia    Subjective:     HPI:  Patient is 74 y.o. male  has a past medical history of Acute hypoxic respiratory failure (09/23/2024), BPH (benign prostatic hyperplasia) (02/28/2024), CAD (coronary artery disease), Chronic HFrEF (heart failure with reduced ejection fraction) (05/01/2024), Diabetes mellitus, Hypertension, Kidney stone, Stroke, and Type 2 diabetes mellitus without complication, without long-term current use of insulin (10/08/2021). presented to [facility] on 4/28/25 s/p fall with shoulder and rib pain.  Cxr with multiple nondisplaced right rib fractures and patient was admitted for pain control.  Patient with worsening confusion along with fever over night and was transferred to ICU.      Patient was followed by Dr. Schmitt as an outpatient for IPF.  He was seen by Dr. Mix at [facility].  Patient was started on OFEV along with cellcept along with weaning regimen of prednisone.  Per Dr. Schmitt last note, patient was discharged with hospice.  Patient activated 911 after fall.      During my initial evaluation, patient was somnolent and groaning persistently.  HR 120s.  Bp stable.  Sating well on high flow nasal canula at 12 lpm.                 Additional Pulmonary History:  Childhood Illnesses:  none  Occupational:   works in air conditioning in installation and repair  Environmental:   no pets, no seasonal allergies, no carpet in his home and no concern for mold or allergy exposures there  Tobacco/Smoking:   never smoker    Hospital/ICU Course:  No notes on file    Interval History: appeared more comfortable this am with toradol and prn morphine.  Transition to nasal  canula without issue.        Objective:     Vital Signs (Most Recent):  Temp: 97.1 °F (36.2 °C) (04/30/25 0500)  Pulse: 90 (04/30/25 0730)  Resp: 12 (04/30/25 0828)  BP: 134/85 (04/30/25 0615)  SpO2: 99 % (04/30/25 0730) Vital Signs (24h Range):  Temp:  [97.1 °F (36.2 °C)-99 °F (37.2 °C)] 97.1 °F (36.2 °C)  Pulse:  [] 90  Resp:  [12-52] 12  SpO2:  [84 %-100 %] 99 %  BP: ()/(50-97) 134/85     Weight: 59.9 kg (132 lb 0.9 oz)  Body mass index is 20.68 kg/m².      Intake/Output Summary (Last 24 hours) at 4/30/2025 1059  Last data filed at 4/30/2025 0600  Gross per 24 hour   Intake 60.48 ml   Output 675 ml   Net -614.52 ml        Physical Exam  Vitals and nursing note reviewed.   Constitutional:       Appearance: He is ill-appearing.   Cardiovascular:      Rate and Rhythm: Regular rhythm. Tachycardia present.   Pulmonary:      Comments: Stable on nasal canula.   Abdominal:      General: Bowel sounds are normal.      Palpations: Abdomen is soft.      Tenderness: There is no abdominal tenderness.   Genitourinary:     Comments: camejo  Musculoskeletal:      Right lower leg: No edema.      Left lower leg: No edema.   Skin:     General: Skin is warm and dry.   Neurological:      Mental Status: Mental status is at baseline.      Comments: Arousable but confuse.             Review of Systems    Vents:  Oxygen Concentration (%): 35 (04/30/25 0721)    Lines/Drains/Airways       Drain  Duration                  Urethral Catheter 04/28/25 2230 Coude 14 Fr. 1 day              Peripheral Intravenous Line  Duration                  Peripheral IV - Single Lumen 04/29/25 0530 20 G Posterior;Right Forearm 1 day         Peripheral IV - Single Lumen 04/29/25 1300 22 G Anterior;Left Forearm <1 day                    Significant Labs:    CBC/Anemia Profile:  Recent Labs   Lab 04/29/25  0525 04/30/25  0428   WBC 10.37 8.23   HGB 13.9* 12.6*   HCT 42.7 38.7*    190   MCV 83 84   RDW 14.4 14.5        Chemistries:  Recent Labs  "  Lab 04/29/25  0525 04/30/25  0428    140   K 4.1 3.6   CL 99 101   CO2 26 23   BUN 18 28*   CREATININE 0.9 1.0   CALCIUM 9.6 9.1   ALBUMIN 3.4*  --    PROT 7.6  --    BILITOT 1.2*  --    ALKPHOS 69  --    ALT 27  --    AST 24  --    MG 1.6  --      BNP  Recent Labs   Lab 04/29/25  1206   *         ABGs:   Recent Labs   Lab 04/29/25  1559   PH 7.381   PCO2 46.3*   HCO3 27.4   POCSATURATED 92   BE 2     Blood Culture: No results for input(s): "LABBLOO" in the last 48 hours.  Respiratory Culture: No results for input(s): "GSRESP", "RESPIRATORYC" in the last 48 hours.  Echo 4/3/25       Left Ventricle: The left ventricle is normal in size. Normal wall thickness. Mild global hypokinesis present. There is mildly reduced systolic function with a visually estimated ejection fraction of 45 - 50%.    Right Ventricle: The right ventricle is normal in size. Systolic function is normal.    Aorta: Aortic root is mildly dilated measuring 3.82 cm.    Similar findings noted on prior report dated 3/17/25.    Significant Imaging:   I have reviewed all pertinent imaging results/findings within the past 24 hours.  CT: I have reviewed all pertinent results/findings within the past 24 hours and my personal findings are:  4/28/25 no overt ggo.  +patchy septal reticulation and honeycombing predominantly at bases.  This is similar to findings on 3/30/25 CT but has worsen when compared to CTA 7/7/23  CXR: I have reviewed all pertinent results/findings within the past 24 hours and my personal findings are:  4/29/25 increase reticular markings.  No overt consolidation    ABG  Recent Labs   Lab 04/29/25  1559   PH 7.381   PO2 66*   PCO2 46.3*   HCO3 27.4   BE 2     Assessment/Plan:     Neuro  Acute metabolic encephalopathy  I suspect a component of baseline dementia + pain meds   Abg without co2 retention.    More alert today    Pulmonary  * Acute on chronic respiratory failure with hypoxia  Hypoxemic requiring high flow nasal " rina  Suspect a combination of baseline IPF + atelectatic changes a/w rib fracture +/- volume overload  Will monitor for aspiration given mental status.  Back to nasal canula.        IPF (idiopathic pulmonary fibrosis)  Followed by Dr. Schmitt as an outpatient  Do not have baseline pft due to poor cooperation  Home oxygen  Was on ofev and cellcept prior to hospice.  Hesitant to start steroid due to ?efficacy and delerium.    Repeat ct chest without acute changes.      Cardiac/Vascular  Chronic diastolic congestive heart failure  EF 40%  Difficulty to appreciate pulmonary edema on cxr given baseline pft.  Clinical exam without overt evidence of volume overload  Bnp 400.  Lasix 40 mg iv x 1 given.  Will hold additional lasix given bump in creatinine and improved oxygenation.      Orthopedic  Rib fractures  Pain control with toradol and prn morphine  Await anesthesiology inputs in regard to regional block    Palliative Care  Advance care planning  4/29/25 Given progression of IPF, patient was discharged to home hospice.  Per ANABELA Glaser, patient activated 911 after fall.  Currently full code.  On going goc discussion with son and palliative care.    4/30/25 spoken with son, Dr. Grant, and he agreed to DNR.  He is also agreeable to home hospice at the time of discharge.  Unclear if patient will still be living in [facility] after discharge.   is working to explore various options available.          Critical Care Time: 35 minutes  Critical secondary to Patient has a condition that poses threat to life and bodily function: respiratory failure.        Critical care was time spent personally by me on the following activities: development of treatment plan with patient or surrogate and bedside caregivers, discussions with consultants, evaluation of patient's response to treatment, examination of patient, ordering and performing treatments and interventions, ordering and review of laboratory studies, ordering and review  of radiographic studies, pulse oximetry, re-evaluation of patient's condition. This critical care time did not overlap with that of any other provider or involve time for any procedures.     Pino Urena MD  Critical Care Medicine  [facility] - Intensive Care

## 2025-04-30 NOTE — PROGRESS NOTES
"[facility] Medicine  Progress Note    Patient Name: Emmanuel Grant  MRN: [id number]  Patient Class: IP- Inpatient   Admission Date: 4/27/2025  Length of Stay: 2 days  Attending Physician: Nargis Parr MD  Primary Care Provider: Wilfredo De Souza MD        Subjective     Principal Problem:Rib fractures        HPI:  Emmanuel Grant is a 75 y/o male with PMHx of HTN, DM2, CVA, CHF, and pulmonary fibrosis with chronic respiratory failure on home oxygen and hospice who presented to ED with right shoulder and rib pain after a fall early this morning.  He states he got up to use the bathroom and he lost his balance.  He landed on his right side and immediately had pain.  He got himself into a chair and called EMS.  No head injury or LOC.  In the ED, he was found to have multiple right-sided rib fractures, CT chest demonstrates multiple nondisplaced and minimally displaced rib fractures are identified including fractures of the 3rd, 4th, 5th, 6th, 7th, and 8th ribs laterally and the 5th rib posteriorly. No pneumothorax and oxygen saturations are 97% on his regular home oxygen of 3L via NC.  He is being admitted for pain control.    Overview/Hospital Course:  Mr Emmanuel Grant is a 74 y.o. man with pulmonary fibrosis on 3L home O2, frequent falls at home, on home hospice who fell, called EMS, and was admitted with R rib fractures 3-8. Started pain control. However, he worsened, becoming febrile, confused, and more hypoxic. He was moved to ICU on 4/29, started antibiotics. Pulmonary, Palliative consulted. His respiratory status has improved. Anesthesia consulted for BETTY block on 4/30.     Interval History: He is less agitated than yesterday. Pain control is better. He is able to speak to me. He cannot name his son at bedside. When asked if he is hungry, he states "yes maam."    Review of Systems   Unable to perform ROS: Mental status change     Objective:     Vital Signs (Most Recent):  Temp: " 97.1 °F (36.2 °C) (04/30/25 0500)  Pulse: 90 (04/30/25 0730)  Resp: 12 (04/30/25 0828)  BP: 134/85 (04/30/25 0615)  SpO2: 99 % (04/30/25 0730) Vital Signs (24h Range):  Temp:  [97.1 °F (36.2 °C)-99 °F (37.2 °C)] 97.1 °F (36.2 °C)  Pulse:  [] 90  Resp:  [12-52] 12  SpO2:  [84 %-100 %] 99 %  BP: ()/(50-97) 134/85     Weight: 59.9 kg (132 lb 0.9 oz)  Body mass index is 20.68 kg/m².    Intake/Output Summary (Last 24 hours) at 4/30/2025 1107  Last data filed at 4/30/2025 0600  Gross per 24 hour   Intake 60.48 ml   Output 675 ml   Net -614.52 ml         Physical Exam  Vitals and nursing note reviewed.   Constitutional:       Appearance: He is ill-appearing.   Cardiovascular:      Rate and Rhythm: Normal rate and regular rhythm.   Pulmonary:      Effort: Pulmonary effort is normal.      Breath sounds: No rhonchi or rales.      Comments: 6L NC  Abdominal:      General: Bowel sounds are normal.      Palpations: Abdomen is soft.      Tenderness: There is no abdominal tenderness.   Genitourinary:     Comments: camejo  Musculoskeletal:      Right lower leg: No edema.      Left lower leg: No edema.   Skin:     General: Skin is warm and dry.   Neurological:      Mental Status: He is disoriented.               Significant Labs: All pertinent labs within the past 24 hours have been reviewed.    Significant Imaging: I have reviewed all pertinent imaging results/findings within the past 24 hours.      Assessment & Plan  Rib fractures  - due to mechanical fall at home  - pain control with lidocaine patch, scheduled tylenol (not able to swallow it), prn iv morphine, scheduled IV ketorolac  - incentive spirometry if he can wake up to do it  - Anesthesia to do BETTY block on 4/30  - avoiding ativan, precedex PRN for agitation  - respiratory status improved on 4/30 compared to day prior  - continues on 6L HFNC  - continue antibiotics for suspected aspiration  Chronic diastolic congestive heart failure  Patient has Diastolic  (HFpEF) heart failure that is Chronic. On presentation their CHF was well compensated.     Echo 4/3/25    Left Ventricle: The left ventricle is normal in size. Normal wall thickness. Mild global hypokinesis present. There is mildly reduced systolic function with a visually estimated ejection fraction of 45 - 50%.    Right Ventricle: The right ventricle is normal in size. Systolic function is normal.    Aorta: Aortic root is mildly dilated measuring 3.82 cm.    Similar findings noted on prior report dated 3/17/25.    Current Heart Failure Medications  hydrALAZINE injection 10 mg, Every 6 hours PRN, Intravenous    Plan  - Place on telemetry  - he is currently too sedated to take home Rx  - he appears hypovolemic- hold lasix          Type 2 diabetes mellitus without complication, without long-term current use of insulin  Lab Results   Component Value Date    HGBA1C 7.8 (H) 01/02/2025     - lantus 5 QHS  - glucose checks with low dose SSI  - hold home oral meds    Essential hypertension  Patient's blood pressure range in the last 24 hours was: BP  Min: 68/50  Max: 169/93.The patient's inpatient anti-hypertensive regimen is listed below:  Current Antihypertensives  hydrALAZINE injection 10 mg, Every 6 hours PRN, Intravenous    Plan  - BP is controlled, no changes needed to their regimen  Coronary artery disease involving 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 Continue to monitor on telemetry.   - note last angiogram 9/2024 with patient LAD stent   - currently unable to swallow asa  BPH (benign prostatic hyperplasia)  - unable to swallow flomax currently  - camejo in place     H/O: CVA (cerebrovascular accident)  - noted     Immunosuppression due to drug therapy  - for IPF- see IPF    Anemia, chronic disease  Anemia is likely due to chronic blood loss. Most recent hemoglobin and  hematocrit are listed below.  Recent Labs     04/28/25  0618 04/29/25  0525 04/30/25  0428   HGB 13.4* 13.9* 12.6*   HCT 40.7 42.7 38.7*     Plan  - Monitor serial CBC: Daily  - Transfuse PRBC if patient becomes hemodynamically unstable, symptomatic or H/H drops below 7/21.  - Patient has not received any PRBC transfusions to date  - Patient's anemia is currently stable    IPF (idiopathic pulmonary fibrosis)  - appeared at baseline upon admission  - on 3L home O2  - continues on home nintedanib if he can swallow it (not currently)  - holding mycophenolate with concerns for infection   Acute on chronic respiratory failure with hypoxia  Patient admitted with Hypoxic which is Acute on Chronic.  he is on home oxygen at 3 LPM. Signs/symptoms of respiratory failure include- tachypnea and increased work of breathing present on admission.   - Labs and images were reviewed. Patient Has recent ABG, which has been reviewed..   - Supplemental oxygen was provided and noted- Venti mask-  % FiO2   - Respiratory failure is due to- Aspiration, Interstitial lung disease, and rib fractures    - rib fractures: pain control, incentive spirometry if he can wake up to do it, BETTY block with anesthesia today   - ILD: continue home Rx if he can tolerate but hold mycophenolate with concerns for infection. Continue inhalers  - fever noted, concern for aspiration: started cefepime- continue  - has HFpEF but currently appears eu/hypovolemic- hold lasix  - Pulmonary following  - respiratory status improved on 4/30 compared to day prior       Advance care planning  Advance Care Planning     Date: 04/29/2025  - Palliative consulted and discussed with family  - note he is on home hospice  - currently continue care but with limitation- no chest compressions, ok for intubation if needed  - family is flying in town  - time spent discussing with palliative and reviewing documentation= 5 minutes     Advance Care Planning     Date: 04/30/2025  -  palliative met with family again today  - DNR/DNI code status  - time spent reviewing= 5 minutes               Acute metabolic encephalopathy  - worsening on 4/28-29  - suspect due to sedative medications given for pain control  - he needs pain/anxiety control but will try to limit ativan use- precedex instead     VTE Risk Mitigation (From admission, onward)           Ordered     enoxaparin injection 40 mg  Every 24 hours         04/29/25 0548     Reason for No Pharmacological VTE Prophylaxis  Once        Question:  Reasons:  Answer:  Risk of Bleeding    04/27/25 1755     IP VTE HIGH RISK PATIENT  Once         04/27/25 1755     Place sequential compression device  Until discontinued         04/27/25 1755                    Discharge Planning   DERIC:      Code Status: DNR   Medical Readiness for Discharge Date:   Discharge Plan A: Hospice/home (Readmit to Hospice)            Critical care time spent on the evaluation and treatment of severe organ dysfunction, review of pertinent labs and imaging studies, discussions with consulting providers and discussions with patient/family: 40 minutes.      3:06 PM  Seen again after rib block. Still agitated, requiring restraints, though denies pain when asked. He was able to swallow liquids- clear liquid diet ordered.       Nargis Parr MD  Department of Hospital Medicine   [facility] - Intensive Care

## 2025-04-30 NOTE — PLAN OF CARE
Pt remains in ICU. Pt nonverbal most of the shift and confused. Pt hypotensive on Precedex. When turning pt, pt becomes extremely restless with all extremities. Restraints in place. Sitter present throughout shift.  Pt remains on venti mask.   Problem: Adult Inpatient Plan of Care  Goal: Plan of Care Review  Outcome: Progressing  Goal: Patient-Specific Goal (Individualized)  Outcome: Progressing  Goal: Absence of Hospital-Acquired Illness or Injury  Outcome: Progressing  Goal: Optimal Comfort and Wellbeing  Outcome: Progressing  Goal: Readiness for Transition of Care  Outcome: Progressing     Problem: Infection  Goal: Absence of Infection Signs and Symptoms  Outcome: Progressing     Problem: Diabetes Comorbidity  Goal: Blood Glucose Level Within Targeted Range  Outcome: Progressing     Problem: Skin Injury Risk Increased  Goal: Skin Health and Integrity  Outcome: Progressing     Problem: Coping Ineffective  Goal: Effective Coping  Outcome: Progressing     Problem: Fall Injury Risk  Goal: Absence of Fall and Fall-Related Injury  Outcome: Progressing     Problem: Restraint, Nonviolent  Goal: Absence of Harm or Injury  Outcome: Progressing     Problem: Pain Acute  Goal: Optimal Pain Control and Function  Outcome: Progressing

## 2025-04-30 NOTE — PLAN OF CARE
Remains in ICU on 6L NC. Able to speak at times, but still disoriented most of the shift. Peripheral nerve block done today. Patient had a few episodes of restlessness and agitation. Morphine given PRN per MAR. Remains of Precedex gtt, titrated per MAR. Restraints in place. Plan of care reviewed with Son.    Problem: Adult Inpatient Plan of Care  Goal: Plan of Care Review  Outcome: Progressing  Goal: Patient-Specific Goal (Individualized)  Outcome: Progressing  Goal: Absence of Hospital-Acquired Illness or Injury  Outcome: Progressing  Goal: Optimal Comfort and Wellbeing  Outcome: Progressing  Goal: Readiness for Transition of Care  Outcome: Progressing     Problem: Infection  Goal: Absence of Infection Signs and Symptoms  Outcome: Progressing     Problem: Diabetes Comorbidity  Goal: Blood Glucose Level Within Targeted Range  Outcome: Progressing     Problem: Skin Injury Risk Increased  Goal: Skin Health and Integrity  Outcome: Progressing     Problem: Coping Ineffective  Goal: Effective Coping  Outcome: Progressing     Problem: Fall Injury Risk  Goal: Absence of Fall and Fall-Related Injury  Outcome: Progressing     Problem: Restraint, Nonviolent  Goal: Absence of Harm or Injury  Outcome: Progressing     Problem: Pain Acute  Goal: Optimal Pain Control and Function  Outcome: Progressing

## 2025-04-30 NOTE — EICU
Virtual ICU Quality Rounds    Admit Date: 4/27/2025  Hospital Day: 2    ICU Day: 1d 1h    24H Vital Sign Range:  Temp:  [97.1 °F (36.2 °C)-100.9 °F (38.3 °C)]   Pulse:  []   Resp:  [12-52]   BP: ()/()   SpO2:  [84 %-100 %]     VICU Surveillance Screening    Daily review of  line necessity(optional): Urinary catheter in place            Antonio Indications : Critically ill in the intensive care unit requiring hourly monitoring

## 2025-04-30 NOTE — PROGRESS NOTES
PACU - patient brought to patient for monitoring post nerve block procedure. Frequent VS and RN at bedside.   Now wakes to voice; does not follow commands.   Dr Naidu at bedside. VSS per flow sheet.  Transported back to ICU with Dr Naidu and RN.

## 2025-04-30 NOTE — ASSESSMENT & PLAN NOTE
Pain control with toradol and prn morphine  Await anesthesiology inputs in regard to regional block

## 2025-04-30 NOTE — ASSESSMENT & PLAN NOTE
I suspect a component of baseline dementia + pain meds   Abg without co2 retention.    More alert today

## 2025-04-30 NOTE — ANESTHESIA PROCEDURE NOTES
Peripheral Block    Patient location during procedure: pre-op   Block not for primary anesthetic.  Reason for block: at surgeon's request and post-op pain management   Post-op Pain Location: Right rib fractures   Start time: 4/30/2025 11:40 AM  Timeout: 4/30/2025 11:40 AM   End time: 4/30/2025 12:06 PM    Staffing  Authorizing Provider: Jack Solorio MD  Performing Provider: Jack Solorio MD    Staffing  Performed by: Jack Solorio MD  Authorized by: Jack Solorio MD    Preanesthetic Checklist  Completed: patient identified, IV checked, site marked, risks and benefits discussed, surgical consent, monitors and equipment checked, pre-op evaluation and timeout performed  Peripheral Block  Patient position: sitting  Prep: ChloraPrep  Patient monitoring: heart rate, cardiac monitor, continuous pulse ox, continuous capnometry and frequent blood pressure checks  Block type: erector spinae plane  Laterality: right  Injection technique: single shot  Interspace: T5-6    Needle  Needle type: Echogenic   Needle gauge: 17 G  Needle length: 3.5 in  Needle localization: anatomical landmarks and ultrasound guidance   -ultrasound image captured on disc.  Assessment  Injection assessment: negative aspiration, negative parasthesia and local visualized surrounding nerve  Paresthesia pain: none  Heart rate change: no  Slow fractionated injection: yes        Additional Notes  20cc of 0.375% bupivacaine with 10mg dexamethasone. 75mg fentanyl and 40mg propofol administered

## 2025-04-30 NOTE — SUBJECTIVE & OBJECTIVE
Interval History: appeared more comfortable this am with toradol and prn morphine.  Transition to nasal canula without issue.        Objective:     Vital Signs (Most Recent):  Temp: 97.1 °F (36.2 °C) (04/30/25 0500)  Pulse: 90 (04/30/25 0730)  Resp: 12 (04/30/25 0828)  BP: 134/85 (04/30/25 0615)  SpO2: 99 % (04/30/25 0730) Vital Signs (24h Range):  Temp:  [97.1 °F (36.2 °C)-99 °F (37.2 °C)] 97.1 °F (36.2 °C)  Pulse:  [] 90  Resp:  [12-52] 12  SpO2:  [84 %-100 %] 99 %  BP: ()/(50-97) 134/85     Weight: 59.9 kg (132 lb 0.9 oz)  Body mass index is 20.68 kg/m².      Intake/Output Summary (Last 24 hours) at 4/30/2025 1059  Last data filed at 4/30/2025 0600  Gross per 24 hour   Intake 60.48 ml   Output 675 ml   Net -614.52 ml        Physical Exam  Vitals and nursing note reviewed.   Constitutional:       Appearance: He is ill-appearing.   Cardiovascular:      Rate and Rhythm: Regular rhythm. Tachycardia present.   Pulmonary:      Comments: Stable on nasal canula.   Abdominal:      General: Bowel sounds are normal.      Palpations: Abdomen is soft.      Tenderness: There is no abdominal tenderness.   Genitourinary:     Comments: camejo  Musculoskeletal:      Right lower leg: No edema.      Left lower leg: No edema.   Skin:     General: Skin is warm and dry.   Neurological:      Mental Status: Mental status is at baseline.      Comments: Arousable but confuse.             Review of Systems    Vents:  Oxygen Concentration (%): 35 (04/30/25 0721)    Lines/Drains/Airways       Drain  Duration                  Urethral Catheter 04/28/25 2230 Coude 14 Fr. 1 day              Peripheral Intravenous Line  Duration                  Peripheral IV - Single Lumen 04/29/25 0530 20 G Posterior;Right Forearm 1 day         Peripheral IV - Single Lumen 04/29/25 1300 22 G Anterior;Left Forearm <1 day                    Significant Labs:    CBC/Anemia Profile:  Recent Labs   Lab 04/29/25  0525 04/30/25  0428   WBC 10.37 8.23   HGB  "13.9* 12.6*   HCT 42.7 38.7*    190   MCV 83 84   RDW 14.4 14.5        Chemistries:  Recent Labs   Lab 04/29/25  0525 04/30/25  0428    140   K 4.1 3.6   CL 99 101   CO2 26 23   BUN 18 28*   CREATININE 0.9 1.0   CALCIUM 9.6 9.1   ALBUMIN 3.4*  --    PROT 7.6  --    BILITOT 1.2*  --    ALKPHOS 69  --    ALT 27  --    AST 24  --    MG 1.6  --      BNP  Recent Labs   Lab 04/29/25  1206   *         ABGs:   Recent Labs   Lab 04/29/25  1559   PH 7.381   PCO2 46.3*   HCO3 27.4   POCSATURATED 92   BE 2     Blood Culture: No results for input(s): "LABBLOO" in the last 48 hours.  Respiratory Culture: No results for input(s): "GSRESP", "RESPIRATORYC" in the last 48 hours.  Echo 4/3/25       Left Ventricle: The left ventricle is normal in size. Normal wall thickness. Mild global hypokinesis present. There is mildly reduced systolic function with a visually estimated ejection fraction of 45 - 50%.    Right Ventricle: The right ventricle is normal in size. Systolic function is normal.    Aorta: Aortic root is mildly dilated measuring 3.82 cm.    Similar findings noted on prior report dated 3/17/25.    Significant Imaging:   I have reviewed all pertinent imaging results/findings within the past 24 hours.  CT: I have reviewed all pertinent results/findings within the past 24 hours and my personal findings are:  4/28/25 no overt ggo.  +patchy septal reticulation and honeycombing predominantly at bases.  This is similar to findings on 3/30/25 CT but has worsen when compared to CTA 7/7/23  CXR: I have reviewed all pertinent results/findings within the past 24 hours and my personal findings are:  4/29/25 increase reticular markings.  No overt consolidation  "

## 2025-04-30 NOTE — EICU
[facility] Night Rounds Checklist  24H Vital Sign Range:  Temp:  [97.8 °F (36.6 °C)-102.3 °F (39.1 °C)]   Pulse:  []   Resp:  [16-45]   BP: (110-181)/()   SpO2:  [88 %-100 %]     Video rounds and LDA reconciliation            Xelashleyz Pregnancy And Lactation Text: This medication is Pregnancy Category D and is not considered safe during pregnancy.  The risk during breast feeding is also uncertain.

## 2025-04-30 NOTE — CONSULTS
1905:    Consult was conducted with the patient's medical provider. He reports the patient 's son is currently in flight from [place] to visit with his Father. He also stated that the patient has a sitter because he was in pain possibly he was suffering, having a fall the patient encountered at his home.    My visit with the patient found him lightly sedated and resting. Per medical provider when the patient is awake he present as being confused. He also stated that the patient suffered broken ribs as a result of his fall and is experiencing pain.    Prayers were offered for the patient. The Spiritual Care Team will continue to provide spiritual support to the patient and his family.        Sandra Beard,   [phone]

## 2025-04-30 NOTE — ASSESSMENT & PLAN NOTE
Hypoxemic requiring high flow nasal canula  Suspect a combination of baseline IPF + atelectatic changes a/w rib fracture +/- volume overload  Will monitor for aspiration given mental status.  Back to nasal canula.

## 2025-04-30 NOTE — ASSESSMENT & PLAN NOTE
- appeared at baseline upon admission  - on 3L home O2  - continues on home nintedanib if he can swallow it (not currently)  - holding mycophenolate with concerns for infection

## 2025-05-01 PROBLEM — R41.0 DELIRIUM: Status: ACTIVE | Noted: 2025-05-01

## 2025-05-01 NOTE — EICU
Virtual ICU Quality Rounds    Admit Date: 4/27/2025  Hospital Day: 3    ICU Day: 2d 12h    24H Vital Sign Range:  Temp:  [96.5 °F (35.8 °C)-97.1 °F (36.2 °C)]   Pulse:  []   Resp:  [10-60]   BP: ()/(56-99)   SpO2:  [77 %-100 %]     VICU Surveillance Screening    Daily review of  line necessity(optional): Urinary catheter in place    Antonio Indications : Urinary retention    LDA reconciliation : Yes

## 2025-05-01 NOTE — ASSESSMENT & PLAN NOTE
5/1/2025   - interval chart reviewed; discussed pt with MDT during ICU rounds   - met with pt and 2/3 sons at bedside; pt with gradually improving mental status but remains unable to participate in GOC/ACP discussions on his own behalf   - ongoing GOC/ACP discussions with sons and ongoing updates/discussions on MDT plans for symptom management and plan of care   - sons working with CM and family to formulate a plan for care following hospitalization   - ultimate plan is for relocation to [place] following hospitalization; family with good insight that this will be a complex task  - recommended consideration of short term local MCC plan as well to allow for pt to recover more from rib fractures while getting support and care through hospice to aid in planning and resources of relocation   - emotional support provided; answered all questions   - Emmanuel Rojas is returning home 5/2 AM, other son with plans to remain local until Wed at this time   - ongoing communication and coordination with MDT     4/30/2025   - interval chart reviewed; discussed pt with MDT during ICU rounds   - met with pt, and son along with Dr. Urena, [facility]; pt continues to lack capacity to participate in discussions/decisions   - Son advocates for pt to be DNR/DNI now that he is present and has good insight into this decision and feels it aligns with pt's wishes   - pt's other sons and their mother are also traveling to be here to see pt and assist in GOC/ACP discussions/plans   - overall insight and plan that pt will not be able to return to living independently; exploring options of MCC placement with hospice in the local area or in [place] with pt's other 2 sons   - Dr. Urena provided update and overview of pt's resp hx/status and current inpatient plan of care; son with good insight as he is a trauma surgeon   - son shared that pt has always been very private and has refused to relocate with prior attempts to aid in care  - son has requested to  meet with  to learn more about [facility] and relocation processes and pt's benefits; update CM of this request   - emotional support provided; answered all questions   - updated MDT     4/29/2025 - Consult   - consult received; interval chart reviewed in detail; discussed pt with MDT during ICU rounds   - pt known to myself and palliative medicine team from previous admission; pt on hospice with [facility] prior to this admission   - discussed current hospice plan of care with hospice team; hospice team has had ongoing GOC discussions with pt and family regarding code status as well as need for [facility] care vs living with family, which has not been successful; pt has been fearful of resp symptom management with opioids  - hospice team will likely require some sort of [facility] care to resume services outside of hospital if pt condition approves or can provide inpatient hospice care if needed   - pt currently with severe encephalopathy and doesn't not have capacity for medical decision making   - spoke with son, Dr. Emmanuel Grant Jr; brief medical updated provided, encouraged his plan to fly to [facility] due to pt's condition ([place]ives out of state)   - reviewed code status; son shares that no formal decision had been made with pt regarding code status; they discussed clear wishes for no chest compressions so son was agreeable to partial code with the chest compressions; son shared pt would likely not wish to be intubated as they have insight that pt being able to be extubated/improve would be unlikely, would not wish for long term life support; however, given circumstances and pt's respiratory status son wishes for intubation if indicated to allow for family to travel to pt   - son plans to take first flight to [facility] today with expected arrival approx 5pm unless flight schedules change   - reviewed current discussed plan of care from MDT and encouraged that the plan would attempt to limit opioids and benzos as  possible, son with good insight that pt's comfort and safety would take precedent in interventions   - emotional support provided; answered all questions  - multiple assessments of pt throughout the day to assess symptom management   - ongoing discussions and communication with MDT

## 2025-05-01 NOTE — ASSESSMENT & PLAN NOTE
Anemia is likely due to chronic blood loss. Most recent hemoglobin and hematocrit are listed below.  Recent Labs     04/29/25  0525 04/30/25  0428 05/01/25  0511   HGB 13.9* 12.6* 12.0*   HCT 42.7 38.7* 36.8*     Plan  - Monitor serial CBC: Daily  - Transfuse PRBC if patient becomes hemodynamically unstable, symptomatic or H/H drops below 7/21.  - Patient has not received any PRBC transfusions to date  - Patient's anemia is currently stable

## 2025-05-01 NOTE — ASSESSMENT & PLAN NOTE
- improving; discussed with bedside RN and later with MDT trial of taking off restraints with pt now more cooperative   - at baseline in prior hospitalizations pt did not even like to stay in his hospital room during the day, would sit in bedside chair in benitez and request to walk with walker in the benitez; restraints could complicate frustration and mood   - cont'd precedex for delirium; also discussed Zyrexa as alternative to Seroquel to aid in delirium day/night confusion until PO is possible, which is now ordered nightly per    - HS delirium is complicating assessment of necessary opioid regimen as increased physical movement is leading to need for increased opioid usage, unfortunately resulting in sedation throughout the day and continuing day/night confusion

## 2025-05-01 NOTE — SUBJECTIVE & OBJECTIVE
Interval History: s/p regional peripheral block.  Still gotten several dose of morphine overnight.  Sleepy this am but arousable.        Objective:     Vital Signs (Most Recent):  Temp: 97.1 °F (36.2 °C) (05/01/25 0400)  Pulse: 75 (05/01/25 1000)  Resp: 12 (05/01/25 1000)  BP: 130/84 (05/01/25 1000)  SpO2: 99 % (05/01/25 1000) Vital Signs (24h Range):  Temp:  [96.7 °F (35.9 °C)-97.1 °F (36.2 °C)] 97.1 °F (36.2 °C)  Pulse:  [] 75  Resp:  [10-60] 12  SpO2:  [78 %-100 %] 99 %  BP: ()/(55-99) 130/84     Weight: 59.9 kg (132 lb 0.9 oz)  Body mass index is 20.68 kg/m².      Intake/Output Summary (Last 24 hours) at 5/1/2025 1114  Last data filed at 5/1/2025 0600  Gross per 24 hour   Intake 54.51 ml   Output 750 ml   Net -695.49 ml        Physical Exam  Vitals and nursing note reviewed.   Constitutional:       Appearance: He is ill-appearing.   Cardiovascular:      Rate and Rhythm: Regular rhythm. Tachycardia present.   Pulmonary:      Comments: Stable on nasal canula.   Abdominal:      General: Bowel sounds are normal.      Palpations: Abdomen is soft.      Tenderness: There is no abdominal tenderness.   Genitourinary:     Comments: camejo  Musculoskeletal:      Right lower leg: No edema.      Left lower leg: No edema.   Skin:     General: Skin is warm and dry.   Neurological:      Mental Status: Mental status is at baseline.      Comments: Arousable but confuse.             Review of Systems    Vents:  Oxygen Concentration (%): 35 (04/30/25 2115)    Lines/Drains/Airways       Drain  Duration                  Urethral Catheter 04/28/25 2230 Coude 14 Fr. 2 days              Peripheral Intravenous Line  Duration                  Peripheral IV - Single Lumen 04/29/25 1300 22 G Anterior;Left Forearm 1 day         Peripheral IV - Single Lumen 04/30/25 1400 22 G Anterior;Distal;Right Upper Arm <1 day                    Significant Labs:    CBC/Anemia Profile:  Recent Labs   Lab 04/30/25  0428 05/01/25  0511   WBC 8.23  "7.88   HGB 12.6* 12.0*   HCT 38.7* 36.8*    213   MCV 84 83   RDW 14.5 14.5        Chemistries:  Recent Labs   Lab 04/30/25  0428 05/01/25  0511    143   K 3.6 4.4    105   CO2 23 19*   BUN 28* 43*   CREATININE 1.0 1.1   CALCIUM 9.1 9.4     BNP  Recent Labs   Lab 04/29/25  1206   *         ABGs:   Recent Labs   Lab 04/29/25  1559   PH 7.381   PCO2 46.3*   HCO3 27.4   POCSATURATED 92   BE 2     Blood Culture: No results for input(s): "LABBLOO" in the last 48 hours.  Respiratory Culture: No results for input(s): "GSRESP", "RESPIRATORYC" in the last 48 hours.  Echo 4/3/25       Left Ventricle: The left ventricle is normal in size. Normal wall thickness. Mild global hypokinesis present. There is mildly reduced systolic function with a visually estimated ejection fraction of 45 - 50%.    Right Ventricle: The right ventricle is normal in size. Systolic function is normal.    Aorta: Aortic root is mildly dilated measuring 3.82 cm.    Similar findings noted on prior report dated 3/17/25.    Significant Imaging:   I have reviewed all pertinent imaging results/findings within the past 24 hours.  CT: I have reviewed all pertinent results/findings within the past 24 hours and my personal findings are:  4/28/25 no overt ggo.  +patchy septal reticulation and honeycombing predominantly at bases.  This is similar to findings on 3/30/25 CT but has worsen when compared to CTA 7/7/23  CXR: I have reviewed all pertinent results/findings within the past 24 hours and my personal findings are:  4/29/25 increase reticular markings.  No overt consolidation  "

## 2025-05-01 NOTE — PLAN OF CARE
More awake and interactive this afternoon.  Precedex continues as per MAR.  Tolerating some clear liquids now.  Not able to tolerate pills yet.  Requiring less pain meds.  Remains very confused unable to recall name of his son at bedside.  Pulling at medical devices intermittently.    Problem: Adult Inpatient Plan of Care  Goal: Plan of Care Review  Outcome: Progressing  Goal: Patient-Specific Goal (Individualized)  Outcome: Progressing  Goal: Absence of Hospital-Acquired Illness or Injury  Outcome: Progressing  Goal: Optimal Comfort and Wellbeing  Outcome: Progressing  Goal: Readiness for Transition of Care  Outcome: Progressing     Problem: Infection  Goal: Absence of Infection Signs and Symptoms  Outcome: Progressing     Problem: Diabetes Comorbidity  Goal: Blood Glucose Level Within Targeted Range  Outcome: Progressing     Problem: Skin Injury Risk Increased  Goal: Skin Health and Integrity  Outcome: Progressing     Problem: Coping Ineffective  Goal: Effective Coping  Outcome: Progressing     Problem: Fall Injury Risk  Goal: Absence of Fall and Fall-Related Injury  Outcome: Progressing     Problem: Restraint, Nonviolent  Goal: Absence of Harm or Injury  Outcome: Progressing     Problem: Pain Acute  Goal: Optimal Pain Control and Function  Outcome: Progressing

## 2025-05-01 NOTE — EICU
[facility] Night Rounds Checklist  24H Vital Sign Range:  Temp:  [96.5 °F (35.8 °C)-97.1 °F (36.2 °C)]   Pulse:  []   Resp:  [10-60]   BP: ()/(55-96)   SpO2:  [78 %-100 %]     Video rounds

## 2025-05-01 NOTE — PROGRESS NOTES
[facility] - Intensive Care  Palliative Medicine  Progress Note    Patient Name: Emmanuel Grant  MRN: [id number]  Admission Date: 4/27/2025  Hospital Length of Stay: 3 days  Code Status: DNR   Attending Provider: Nargis Parr MD  Consulting Provider: Janis Glaser NP  Primary Care Physician: Wilfredo De Souza MD  Principal Problem:Rib fractures    Patient information was obtained from relative(s) and primary team.      Assessment/Plan:   Palliative Care  Advance Care Planning   5/1/2025   - interval chart reviewed; discussed pt with MDT during ICU rounds   - met with pt and 2/3 sons at bedside; pt with gradually improving mental status but remains unable to participate in GOC/ACP discussions on his own behalf   - ongoing GOC/ACP discussions with sons and ongoing updates/discussions on MDT plans for symptom management and plan of care   - sons working with CM and family to formulate a plan for care following hospitalization   - ultimate plan is for relocation to [place] following hospitalization; family with good insight that this will be a complex task  - recommended consideration of short term local [facility] plan as well to allow for pt to recover more from rib fractures while getting support and care through hospice to aid in planning and resources of relocation   - emotional support provided; answered all questions   - Emmanuel Rojas is returning home 5/2 AM, other son with plans to remain local until Wed at this time   - ongoing communication and coordination with MDT     4/30/2025   - interval chart reviewed; discussed pt with MDT during ICU rounds   - met with pt, and son along with Dr. Urena, [facility]; pt continues to lack capacity to participate in discussions/decisions   - Son advocates for pt to be DNR/DNI now that he is present and has good insight into this decision and feels it aligns with pt's wishes   - pt's other sons and their mother are also traveling to be here to see pt and assist in GOC/ACP  discussions/plans   - overall insight and plan that pt will not be able to return to living independently; exploring options of correction placement with hospice in the local area or in [place] with pt's other 2 sons   - Dr. Urena provided update and overview of pt's resp hx/status and current inpatient plan of care; son with good insight as he is a trauma surgeon   - son shared that pt has always been very private and has refused to relocate with prior attempts to aid in care  - son has requested to meet with  to learn more about correction and relocation processes and pt's benefits; update CM of this request   - emotional support provided; answered all questions   - updated MDT     4/29/2025 - Consult   - consult received; interval chart reviewed in detail; discussed pt with MDT during ICU rounds   - pt known to myself and palliative medicine team from previous admission; pt on hospice with [facility] prior to this admission   - discussed current hospice plan of care with hospice team; hospice team has had ongoing GOC discussions with pt and family regarding code status as well as need for correction care vs living with family, which has not been successful; pt has been fearful of resp symptom management with opioids  - hospice team will likely require some sort of correction care to resume services outside of hospital if pt condition approves or can provide inpatient hospice care if needed   - pt currently with severe encephalopathy and doesn't not have capacity for medical decision making   - spoke with son, Dr. Emmanuel Grant Jr; brief medical updated provided, encouraged his plan to fly to area due to pt's condition ([place] of state)   - reviewed code status; son shares that no formal decision had been made with pt regarding code status; they discussed clear wishes for no chest compressions so son was agreeable to partial code with the chest compressions; son shared pt would likely not wish to be  intubated as they have insight that pt being able to be extubated/improve would be unlikely, would not wish for long term life support; however, given circumstances and pt's respiratory status son wishes for intubation if indicated to allow for family to travel to pt   - son plans to take first flight to area today with expected arrival approx 5pm unless flight schedules change   - reviewed current discussed plan of care from MDT and encouraged that the plan would attempt to limit opioids and benzos as possible, son with good insight that pt's comfort and safety would take precedent in interventions   - emotional support provided; answered all questions  - multiple assessments of pt throughout the day to assess symptom management   - ongoing discussions and communication with MDT     Neuro  Acute metabolic encephalopathy  - improving; discussed with bedside RN and later with MDT trial of taking off restraints with pt now more cooperative   - at baseline in prior hospitalizations pt did not even like to stay in his hospital room during the day, would sit in bedside chair in benitez and request to walk with walker in the benitez; restraints could complicate frustration and mood   - cont'd precedex for delirium; also discussed Zyrexa as alternative to Seroquel to aid in delirium day/night confusion until PO is possible, which is now ordered nightly per    - HS delirium is complicating assessment of necessary opioid regimen as increased physical movement is leading to need for increased opioid usage, unfortunately resulting in sedation throughout the day and continuing day/night confusion     Pulmonary  Acute on chronic respiratory failure with hypoxia  - pt with multiple related admissions; typically associated with encephalopathy on initial admission   - pt has been under the care of hospice for this but has been very resistant to symptom management with opioids to avoid severe exacerbations   - overall resp status  improved to baseline, continued concerns for worsening in the near future given risk for PNA r/t aspiration and rib fracture guarding/shallow breathing     IPF (idiopathic pulmonary fibrosis)  - pt with long term hx of IPF/ILD; pt and son with good insight into pt's condition   - in prior admission, discussed trajectory of life limiting condition with pt, along with Dr. Ayanna BROWN who reviewed worsening condition on multiple CT images with pt   - see resp failure       Cardiac/Vascular  Chronic diastolic congestive heart failure  - chronic condition noted  - contributes to pt's overall condition and hospice qualification; contributes to pt's lack of reserve   - echo's have been relatively stable with current EF 50-45%  - discussed trajectory of life-limiting condition with pt during prior admissions     Orthopedic  * Rib fractures  - 2/2 fall at home; during prior admissions pt shared fall history and weakness which has lead to discussion with pt and son regarding concern for safety of pt continuing to live at home  - pt on home hospice prior to admission; hospice team confirms continued recommendation for care home placement/need for increased pt support   - pt not able to report characteristics or severity of pain during AM assessment due to AMS/delirium; pt moaning, shouting, restless PRN morphine 4mg and ativan 1 mg needed to achieve pt comfort, still with some break through milder moaning and restlessness   - 4/29 - complicated pain management due to respiratory status and mental status (unable to tolerate prior ordered PO meds); discussed consideration of IV toradol or IV acetaminophen as adjunct to opioid regimen in attempts to decrease opioid needs; also discussed consideration of consulting anesthesia for eval of regional block   - 4/30 - plan for regional block with anesthesia today; pt with improved pain control and plan for limiting opioids as possible due to AMS and resp status   - 5/1 - multiple MDT  "discussed regarding approach/plan for pain management moving forward; Toradol and regional block seem to be improving pain management, but are only temporary options; unsafe for PO complicating non-opioid regimen options; day/night confusion and delirium (increased movements etc) increasing pt's pain med needs/administration during evening/night; current plan is to optimize delirium prevention and treatment in and effort to minimize need for excessive opioids to determine appropriate pain management plan that will not complicate delirium but also provide adequate pain control; improving mental status will allow for PO options in pain regimen       I will follow-up with patient. Please contact us if you have any additional questions.    Subjective:     Chief Complaint:   Chief Complaint   Patient presents with    Fall     Arrived via [facility] after slip and fall this morning. Denies any LOC or hitting head. Complaining of R arm and R thoracic pain r/t landing on R side. No obvious deformities or signs of distress. On hospice for pulm fibrosis. On 3L nasal cannula at home.        HPI:   From H&P: "Patient is 74 y.o. male  has a past medical history of Acute hypoxic respiratory failure (09/23/2024), BPH (benign prostatic hyperplasia) (02/28/2024), CAD (coronary artery disease), Chronic HFrEF (heart failure with reduced ejection fraction) (05/01/2024), Diabetes mellitus, Hypertension, Kidney stone, Stroke, and Type 2 diabetes mellitus without complication, without long-term current use of insulin (10/08/2021). presented to [facility] on 4/28/25 s/p fall with shoulder and rib pain.  Cxr with multiple nondisplaced right rib fractures and patient was admitted for pain control.  Patient with worsening confusion along with fever over night and was transferred to ICU.       Patient was followed by Dr. Schmitt as an outpatient for IPF.  [place] [facility] [place] was started on OFEV along with cellcept " "along with weaning regimen of prednisone.  Per Dr. Schmitt last note, patient was discharged with hospice.  Patient activated 911 after fall.       During my initial evaluation, patient was somnolent and groaning persistently.  HR 120s.  Bp stable.  Sating well on high flow nasal canula at 12 lpm. "     Palliative medicine consulted for goals of care discussion and advance care planning; for details of visit, see advance care planning section of plan.      Hospital Course:  No notes on file    Medications:  Continuous Infusions:   dexmedeTOMIDine (Precedex) infusion (titrating)  0-1.4 mcg/kg/hr Intravenous Continuous 8.99 mL/hr at 05/01/25 1600 0.6 mcg/kg/hr at 05/01/25 1600     Scheduled Meds:   acetaminophen  1,000 mg Oral Q8H    arformoteroL  15 mcg Nebulization BID    aspirin  81 mg Oral Daily    budesonide  1 mg Nebulization BID    ceFEPime IV (PEDS and ADULTS)  2 g Intravenous Q12H    enoxparin  30 mg Subcutaneous Q12H (prophylaxis, 0900/2100)    insulin glargine U-100  15 Units Subcutaneous Daily    ketorolac  15 mg Intravenous Q8H    latanoprost  1 drop Both Eyes QHS    LIDOcaine  3 patch Transdermal Q24H    mupirocin   Nasal BID    nintedanib  150 mg Oral BID    OLANZapine  10 mg Intramuscular QHS     PRN Meds:  Current Facility-Administered Medications:     albuterol-ipratropium, 3 mL, Nebulization, Q4H PRN    dextrose 50%, 12.5 g, Intravenous, PRN    dextrose 50%, 25 g, Intravenous, PRN    diphenhydrAMINE, 25 mg, Oral, Q6H PRN    fentaNYL, 25 mcg, Intravenous, Q3H PRN    glucagon (human recombinant), 1 mg, Intramuscular, PRN    glucose, 16 g, Oral, PRN    glucose, 24 g, Oral, PRN    hydrALAZINE, 10 mg, Intravenous, Q6H PRN    insulin aspart U-100, 0-10 Units, Subcutaneous, Q6H PRN    lorazepam, 1 mg, Intravenous, Q4H PRN    morphine, 1 mg, Intravenous, Q4H PRN    morphine, 2 mg, Intravenous, Q3H PRN    naloxone, 0.02 mg, Intravenous, PRN    ondansetron, 4 mg, Intravenous, Q8H PRN    polyethylene glycol, 17 " g, Oral, Daily PRN    promethazine-codeine 6.25-10 mg/5 ml, 5 mL, Oral, Q4H PRN    simethicone, 80 mg, Oral, TID PRN    sodium chloride 0.9%, 10 mL, Intravenous, PRN    tiZANidine, 2 mg, Oral, Q8H PRN    traZODone, 50 mg, Oral, Nightly PRN    Objective:     Vital Signs (Most Recent):  Temp: 96.7 °F (35.9 °C) (05/01/25 1500)  Pulse: 73 (05/01/25 1630)  Resp: 12 (05/01/25 1630)  BP: 98/67 (05/01/25 1630)  SpO2: 97 % (05/01/25 1630) Vital Signs (24h Range):  Temp:  [96.5 °F (35.8 °C)-97.1 °F (36.2 °C)] 96.7 °F (35.9 °C)  Pulse:  [] 73  Resp:  [10-60] 12  SpO2:  [77 %-100 %] 97 %  BP: ()/(56-99) 98/67     Weight: 59.9 kg (132 lb 0.9 oz)  Body mass index is 20.68 kg/m².       Physical Exam  Vitals and nursing note reviewed.   Constitutional:       Appearance: He is cachectic. He is ill-appearing.      Interventions: He is sedated. Nasal cannula in place.   HENT:      Head: Atraumatic.      Comments: Temporal wasting   Pulmonary:      Effort: Pulmonary effort is normal. No respiratory distress.   Musculoskeletal:      Comments: Muscular wasting    Neurological:      Mental Status: He is easily aroused. He is lethargic.      Comments: Able to answer some simple questions, attempting to ask questions, more appropriately responding to commands/direction, making eye contact and tracking    Psychiatric:         Behavior: Behavior is cooperative.        Advance Care Planning   Advance Directives:   Living Will: No    LaPOST: No    Do Not Resuscitate Status: Yes    Medical Power of : No (Verbal declaration that son, Emmanuel Grant Jr.,  is preferred MPOA)      Decision Making:  Family answered questions  Goals of Care: What is most important right now is to focus on spending time at home, avoiding the hospital, remaining as independent as possible, symptom/pain control, quality of life, even if it means sacrificing a little time. Accordingly, we have decided that the best plan to meet the patient's goals  includes continuing with treatment.     Significant Labs: All pertinent labs within the past 24 hours have been reviewed.  CBC:   Recent Labs   Lab 05/01/25  0511   WBC 7.88   HGB 12.0*   HCT 36.8*   MCV 83        BMP:  Recent Labs   Lab 05/01/25  1243   *      K 3.7      CO2 25   BUN 44*   CREATININE 1.1   CALCIUM 9.5     LFT:  Lab Results   Component Value Date    AST 24 04/29/2025    ALKPHOS 69 04/29/2025    BILITOT 1.2 (H) 04/29/2025     Albumin:   Albumin   Date Value Ref Range Status   04/29/2025 3.4 (L) 3.5 - 5.2 g/dL Final   03/17/2025 2.9 (L) 3.5 - 5.2 g/dL Final     Protein:   Protein Total   Date Value Ref Range Status   04/29/2025 7.6 6.0 - 8.4 gm/dL Final     Total Protein   Date Value Ref Range Status   03/17/2025 6.7 6.0 - 8.4 g/dL Final     Lactic acid:   Lab Results   Component Value Date    LACTATE 1.5 04/29/2025    LACTATE 1.8 04/03/2025       Significant Imaging: I have reviewed all pertinent imaging results/findings within the past 24 hours.    Total visit time: 85 minutes    35 min visit time including: face to face time in discussion of symptom assessment, and exploring options and burdens of offered treatments.  This also includes non-face to face time preparing to see the patient including chart review, obtaining and/or reviewing separately obtained history, documenting clinical information in the electronic or other health record, independently interpreting results and communicating results to the patient/family/caregiver, family discussions by phone if not able to be present, coordination of care with other specialists, and discharge planning.     50 min ACP time spent: goals of care, advanced care planning, emotional support, formulating and communicating prognosis, exploring burden/ benefit of various approaches of treatment.     Janis Glaser NP  Palliative Medicine  [facility] - [place], [place] - Intensive Care

## 2025-05-01 NOTE — ASSESSMENT & PLAN NOTE
Patient admitted with Hypoxic which is Acute on Chronic.  he is on home oxygen at 3 LPM. Signs/symptoms of respiratory failure include- tachypnea and increased work of breathing present on admission.   - Labs and images were reviewed. Patient Has recent ABG, which has been reviewed..   - Supplemental oxygen was provided and noted- 3L NC  - Respiratory failure is due to- Aspiration, Interstitial lung disease, and rib fractures   - rib fractures: pain control, incentive spirometry if he can wake up to do it, BETTY block with anesthesia completed 4/30  - ILD: continue home Rx if he can tolerate but hold mycophenolate with concerns for infection. Continue inhalers  - fever noted, concern for aspiration: started cefepime- continue x5 day course  - has HFpEF but currently appears eu/hypovolemic- hold lasix  - Pulmonary following  - respiratory status improving

## 2025-05-01 NOTE — PLAN OF CARE
Changes in medical condition or discharge plan: Patient remains in the ICU on Precedex and restraints.  Per SIBR this am patient was agitated last night, remains confused and camejo remains in place for retention.  Pain control main problem MD working on today.    Does patient need new DME? tbd    Follow up appts needed: tbd    Medically stable: not at this time    Estimated Discharge Date: unknown    Family is considering NH placement.  Some family members live in [place] and some in [facility].  I provided the patient a choice of post acute providers and offered a list of CMS rated NH and SNF.  Family to discuss and provide at least 3 choices in each state.  Son has gathered needed paperwork:  3 bank statements and SS award letter.  Plan is for son to transport patient to facility either from the hospital or from his home.  CM explained that if patient goes home from the hospital and family follows up with NH placement he will need 24 hour care.  Family agreed.         05/01/25 1045   Discharge Reassessment   Assessment Type Discharge Planning Reassessment   Did the patient's condition or plan change since previous assessment? No   Discharge Plan discussed with: Adult children   Communicated DERIC with patient/caregiver Date not available/Unable to determine   Discharge Plan A New Nursing Home placement - [facility] care facility   Discharge Plan B Home with family;Home Health   DME Needed Upon Discharge    (tbd)   Transition of Care Barriers None   Why the patient remains in the hospital Requires continued medical care   Post-Acute Status   Post-Acute Authorization Placement   Post-Acute Placement Status Patient List Provided

## 2025-05-01 NOTE — ASSESSMENT & PLAN NOTE
Patient's blood pressure range in the last 24 hours was: BP  Min: 82/55  Max: 152/94.The patient's inpatient anti-hypertensive regimen is listed below:  Current Antihypertensives  hydrALAZINE injection 10 mg, Every 6 hours PRN, Intravenous    Plan  - BP is controlled, no changes needed to their regimen

## 2025-05-01 NOTE — ASSESSMENT & PLAN NOTE
Lab Results   Component Value Date    HGBA1C 7.8 (H) 01/02/2025     - lantus 15QD  - glucose checks with low dose SSI  - hold home oral meds  - AG present this AM-- if still present this PM, will start insulin gtt

## 2025-05-01 NOTE — PLAN OF CARE
Pt remains in ICU on precedex gtt. Pt restless, agitated, and in pain throughout shift. Prn medication given. Fentanyl PRN added. Family at bedside throughout shift. Pt did not sleep long.   Problem: Adult Inpatient Plan of Care  Goal: Plan of Care Review  Outcome: Progressing  Goal: Patient-Specific Goal (Individualized)  Outcome: Progressing  Goal: Absence of Hospital-Acquired Illness or Injury  Outcome: Progressing  Goal: Optimal Comfort and Wellbeing  Outcome: Progressing  Goal: Readiness for Transition of Care  Outcome: Progressing     Problem: Infection  Goal: Absence of Infection Signs and Symptoms  Outcome: Progressing     Problem: Diabetes Comorbidity  Goal: Blood Glucose Level Within Targeted Range  Outcome: Progressing     Problem: Skin Injury Risk Increased  Goal: Skin Health and Integrity  Outcome: Progressing     Problem: Coping Ineffective  Goal: Effective Coping  Outcome: Progressing     Problem: Fall Injury Risk  Goal: Absence of Fall and Fall-Related Injury  Outcome: Progressing     Problem: Restraint, Nonviolent  Goal: Absence of Harm or Injury  Outcome: Progressing     Problem: Pain Acute  Goal: Optimal Pain Control and Function  Outcome: Progressing

## 2025-05-01 NOTE — ASSESSMENT & PLAN NOTE
- pt with multiple related admissions; typically associated with encephalopathy on initial admission   - pt has been under the care of hospice for this but has been very resistant to symptom management with opioids to avoid severe exacerbations   - overall resp status improved to baseline, continued concerns for worsening in the near future given risk for PNA r/t aspiration and rib fracture guarding/shallow breathing

## 2025-05-01 NOTE — ASSESSMENT & PLAN NOTE
Hyperglycemia with  with anion gap of 19.  Beta hydroxybutyrate elevated  Lantus increase by Dr. Parr  Await repeat bmp.  May need insulin gtt.

## 2025-05-01 NOTE — ASSESSMENT & PLAN NOTE
Followed by Dr. Schmitt as an outpatient  Do not have baseline pft due to poor cooperation  Home oxygen  Was on ofev and cellcept prior to hospice.    Repeat ct chest without acute changes.    Resume ofev and cellcept once tolerating po intake.

## 2025-05-01 NOTE — ASSESSMENT & PLAN NOTE
- due to mechanical fall at home  - pain control with lidocaine patch, scheduled tylenol (not able to swallow it), prn iv morphine, scheduled IV ketorolac  - incentive spirometry if he can wake up to do it  - Anesthesia completed BETTY block on 4/30  - avoiding ativan, precedex PRN for agitation  - respiratory status improved on 4/30 compared to day prior  - continues on 3L HFNC-- now dose  - continue antibiotics for suspected aspiration x5 days   14-Oct-2023

## 2025-05-01 NOTE — PROGRESS NOTES
[facility]  Pulmonology  Progress Note    Patient Name: Emmanuel Grant  MRN: [id number]  Admission Date: 4/27/2025  Hospital Length of Stay: 3 days  Code Status: DNR  Attending Provider: Nargis Parr MD  Primary Care Provider: Wilfredo De Souza MD   Principal Problem: Rib fractures    Subjective:     Interval History: s/p regional peripheral block.  Still gotten several dose of morphine overnight.  Sleepy this am but arousable.        Objective:     Vital Signs (Most Recent):  Temp: 97.1 °F (36.2 °C) (05/01/25 0400)  Pulse: 75 (05/01/25 1000)  Resp: 12 (05/01/25 1000)  BP: 130/84 (05/01/25 1000)  SpO2: 99 % (05/01/25 1000) Vital Signs (24h Range):  Temp:  [96.7 °F (35.9 °C)-97.1 °F (36.2 °C)] 97.1 °F (36.2 °C)  Pulse:  [] 75  Resp:  [10-60] 12  SpO2:  [78 %-100 %] 99 %  BP: ()/(55-99) 130/84     Weight: 59.9 kg (132 lb 0.9 oz)  Body mass index is 20.68 kg/m².      Intake/Output Summary (Last 24 hours) at 5/1/2025 1114  Last data filed at 5/1/2025 0600  Gross per 24 hour   Intake 54.51 ml   Output 750 ml   Net -695.49 ml        Physical Exam  Vitals and nursing note reviewed.   Constitutional:       Appearance: He is ill-appearing.   Cardiovascular:      Rate and Rhythm: Regular rhythm. Tachycardia present.   Pulmonary:      Comments: Stable on nasal canula.   Abdominal:      General: Bowel sounds are normal.      Palpations: Abdomen is soft.      Tenderness: There is no abdominal tenderness.   Genitourinary:     Comments: camejo  Musculoskeletal:      Right lower leg: No edema.      Left lower leg: No edema.   Skin:     General: Skin is warm and dry.   Neurological:      Mental Status: Mental status is at baseline.      Comments: Arousable but confuse.             Review of Systems    Vents:  Oxygen Concentration (%): 35 (04/30/25 2115)    Lines/Drains/Airways       Drain  Duration                  Urethral Catheter 04/28/25 2230 Coude 14 Fr. 2 days              Peripheral Intravenous  "Line  Duration                  Peripheral IV - Single Lumen 04/29/25 1300 22 G Anterior;Left Forearm 1 day         Peripheral IV - Single Lumen 04/30/25 1400 22 G Anterior;Distal;Right Upper Arm <1 day                    Significant Labs:    CBC/Anemia Profile:  Recent Labs   Lab 04/30/25  0428 05/01/25  0511   WBC 8.23 7.88   HGB 12.6* 12.0*   HCT 38.7* 36.8*    213   MCV 84 83   RDW 14.5 14.5        Chemistries:  Recent Labs   Lab 04/30/25  0428 05/01/25  0511    143   K 3.6 4.4    105   CO2 23 19*   BUN 28* 43*   CREATININE 1.0 1.1   CALCIUM 9.1 9.4     BNP  Recent Labs   Lab 04/29/25  1206   *         ABGs:   Recent Labs   Lab 04/29/25  1559   PH 7.381   PCO2 46.3*   HCO3 27.4   POCSATURATED 92   BE 2     Blood Culture: No results for input(s): "LABBLOO" in the last 48 hours.  Respiratory Culture: No results for input(s): "GSRESP", "RESPIRATORYC" in the last 48 hours.  Echo 4/3/25       Left Ventricle: The left ventricle is normal in size. Normal wall thickness. Mild global hypokinesis present. There is mildly reduced systolic function with a visually estimated ejection fraction of 45 - 50%.    Right Ventricle: The right ventricle is normal in size. Systolic function is normal.    Aorta: Aortic root is mildly dilated measuring 3.82 cm.    Similar findings noted on prior report dated 3/17/25.    Significant Imaging:   I have reviewed all pertinent imaging results/findings within the past 24 hours.  CT: I have reviewed all pertinent results/findings within the past 24 hours and my personal findings are:  4/28/25 no overt ggo.  +patchy septal reticulation and honeycombing predominantly at bases.  This is similar to findings on 3/30/25 CT but has worsen when compared to CTA 7/7/23  CXR: I have reviewed all pertinent results/findings within the past 24 hours and my personal findings are:  4/29/25 increase reticular markings.  No overt consolidation    ABG  Recent Labs   Lab 04/29/25  1559 "   PH 7.381   PO2 66*   PCO2 46.3*   HCO3 27.4   BE 2     Assessment/Plan:     Neuro  Delirium  Sleepy during the day and agitated at night.  Will start seroquel.     Acute metabolic encephalopathy  I suspect a component of baseline dementia + pain meds   Abg without co2 retention.    More alert today    Pulmonary  Acute on chronic respiratory failure with hypoxia  Hypoxemic requiring high flow nasal canula  Suspect a combination of baseline IPF + atelectatic changes a/w rib fracture +/- volume overload  Will monitor for aspiration given mental status.  Back to nasal canula.        IPF (idiopathic pulmonary fibrosis)  Followed by Dr. Schmitt as an outpatient  Do not have baseline pft due to poor cooperation  Home oxygen  Was on ofev and cellcept prior to hospice.    Repeat ct chest without acute changes.    Resume ofev and cellcept once tolerating po intake.     Endocrine  Type 2 diabetes mellitus without complication, without long-term current use of insulin  Hyperglycemia with  with anion gap of 19.  Beta hydroxybutyrate elevated  Lantus increase by Dr. Parr  Await repeat bmp.  May need insulin gtt.      Orthopedic  * Rib fractures  Pain control with toradol and prn morphine  Regional block per anesthesiology.  Difficult assessing pain given ams.  However, patient is calm and sleepy this am.  Prn morphine    Palliative Care  Advance care planning  4/29/25 Given progression of IPF, patient was discharged to home hospice.  Per ANABELA Glaser, patient activated 911 after fall.  Currently full code.  On going [facility] discussion with son and palliative care.    4/30/25 spoken with son, Dr. Grant, and he agreed to DNR.  He is also agreeable to home hospice at the time of discharge.  Unclear if patient will still be living in [facility] after discharge.   is working to explore various options available.             Pino Urena MD  Pulmonology  [facility] - Intensive Care

## 2025-05-01 NOTE — ASSESSMENT & PLAN NOTE
- 2/2 fall at home; during prior admissions pt shared fall history and weakness which has lead to discussion with pt and son regarding concern for safety of pt continuing to live at home  - pt on home hospice prior to admission; hospice team confirms continued recommendation for shelter placement/need for increased pt support   - pt not able to report characteristics or severity of pain during AM assessment due to AMS/delirium; pt moaning, shouting, restless PRN morphine 4mg and ativan 1 mg needed to achieve pt comfort, still with some break through milder moaning and restlessness   - 4/29 - complicated pain management due to respiratory status and mental status (unable to tolerate prior ordered PO meds); discussed consideration of IV toradol or IV acetaminophen as adjunct to opioid regimen in attempts to decrease opioid needs; also discussed consideration of consulting anesthesia for eval of regional block   - 4/30 - plan for regional block with anesthesia today; pt with improved pain control and plan for limiting opioids as possible due to AMS and resp status   - 5/1 - multiple MDT discussed regarding approach/plan for pain management moving forward; Toradol and regional block seem to be improving pain management, but are only temporary options; unsafe for PO complicating non-opioid regimen options; day/night confusion and delirium (increased movements etc) increasing pt's pain med needs/administration during evening/night; current plan is to optimize delirium prevention and treatment in and effort to minimize need for excessive opioids to determine appropriate pain management plan that will not complicate delirium but also provide adequate pain control; improving mental status will allow for PO options in pain regimen

## 2025-05-01 NOTE — ASSESSMENT & PLAN NOTE
Pain control with toradol and prn morphine  Regional block per anesthesiology.  Difficult assessing pain given ams.  However, patient is calm and sleepy this am.  Prn morphine

## 2025-05-01 NOTE — SUBJECTIVE & OBJECTIVE
Interval History: He was very awake and alert and agitated overnight, received morphine and fentanyl. Today he is more sleepy, but he does wake up. He tells me [facility] is at [facility] [employer] [facility]. He says he broke his ribs and he has pain.     Review of Systems   Unable to perform ROS: Mental status change     Objective:     Vital Signs (Most Recent):  Temp: 96.6 °F (35.9 °C) (05/01/25 1145)  Pulse: 86 (05/01/25 1145)  Resp: 19 (05/01/25 1145)  BP: 112/67 (05/01/25 1145)  SpO2: 99 % (05/01/25 1145) Vital Signs (24h Range):  Temp:  [96.5 °F (35.8 °C)-97.1 °F (36.2 °C)] 96.6 °F (35.9 °C)  Pulse:  [] 86  Resp:  [10-60] 19  SpO2:  [78 %-100 %] 99 %  BP: ()/(55-99) 112/67     Weight: 59.9 kg (132 lb 0.9 oz)  Body mass index is 20.68 kg/m².    Intake/Output Summary (Last 24 hours) at 5/1/2025 1259  Last data filed at 5/1/2025 1100  Gross per 24 hour   Intake 92.17 ml   Output 750 ml   Net -657.83 ml         Physical Exam  Vitals and nursing note reviewed.   Constitutional:       Appearance: He is ill-appearing.   Cardiovascular:      Rate and Rhythm: Normal rate and regular rhythm.   Pulmonary:      Effort: Pulmonary effort is normal.      Breath sounds: No rhonchi or rales.      Comments: 3L NC  Abdominal:      General: Bowel sounds are normal.      Palpations: Abdomen is soft.      Tenderness: There is no abdominal tenderness.   Genitourinary:     Comments: camejo  Musculoskeletal:      Right lower leg: No edema.      Left lower leg: No edema.   Skin:     General: Skin is warm and dry.   Neurological:      Mental Status: He is disoriented.               Significant Labs: All pertinent labs within the past 24 hours have been reviewed.    Significant Imaging: I have reviewed all pertinent imaging results/findings within the past 24 hours.

## 2025-05-01 NOTE — EICU
EICU Physician Brief Note - Overnight Events    Called for severe rib pain despite prn morphine.  Chart reviewed briefly.  Plan:  Fentanyl 50 mcg IVP x1.  If effective, continue with Fentanyl 25 mcg IV prn.  Eicu is available should acute issues arise.

## 2025-05-01 NOTE — SUBJECTIVE & OBJECTIVE
Medications:  Continuous Infusions:   dexmedeTOMIDine (Precedex) infusion (titrating)  0-1.4 mcg/kg/hr Intravenous Continuous 8.99 mL/hr at 05/01/25 1600 0.6 mcg/kg/hr at 05/01/25 1600     Scheduled Meds:   acetaminophen  1,000 mg Oral Q8H    arformoteroL  15 mcg Nebulization BID    aspirin  81 mg Oral Daily    budesonide  1 mg Nebulization BID    ceFEPime IV (PEDS and ADULTS)  2 g Intravenous Q12H    enoxparin  30 mg Subcutaneous Q12H (prophylaxis, 0900/2100)    insulin glargine U-100  15 Units Subcutaneous Daily    ketorolac  15 mg Intravenous Q8H    latanoprost  1 drop Both Eyes QHS    LIDOcaine  3 patch Transdermal Q24H    mupirocin   Nasal BID    nintedanib  150 mg Oral BID    OLANZapine  10 mg Intramuscular QHS     PRN Meds:  Current Facility-Administered Medications:     albuterol-ipratropium, 3 mL, Nebulization, Q4H PRN    dextrose 50%, 12.5 g, Intravenous, PRN    dextrose 50%, 25 g, Intravenous, PRN    diphenhydrAMINE, 25 mg, Oral, Q6H PRN    fentaNYL, 25 mcg, Intravenous, Q3H PRN    glucagon (human recombinant), 1 mg, Intramuscular, PRN    glucose, 16 g, Oral, PRN    glucose, 24 g, Oral, PRN    hydrALAZINE, 10 mg, Intravenous, Q6H PRN    insulin aspart U-100, 0-10 Units, Subcutaneous, Q6H PRN    lorazepam, 1 mg, Intravenous, Q4H PRN    morphine, 1 mg, Intravenous, Q4H PRN    morphine, 2 mg, Intravenous, Q3H PRN    naloxone, 0.02 mg, Intravenous, PRN    ondansetron, 4 mg, Intravenous, Q8H PRN    polyethylene glycol, 17 g, Oral, Daily PRN    promethazine-codeine 6.25-10 mg/5 ml, 5 mL, Oral, Q4H PRN    simethicone, 80 mg, Oral, TID PRN    sodium chloride 0.9%, 10 mL, Intravenous, PRN    tiZANidine, 2 mg, Oral, Q8H PRN    traZODone, 50 mg, Oral, Nightly PRN    Objective:     Vital Signs (Most Recent):  Temp: 96.7 °F (35.9 °C) (05/01/25 1500)  Pulse: 73 (05/01/25 1630)  Resp: 12 (05/01/25 1630)  BP: 98/67 (05/01/25 1630)  SpO2: 97 % (05/01/25 1630) Vital Signs (24h Range):  Temp:  [96.5 °F (35.8 °C)-97.1 °F  (36.2 °C)] 96.7 °F (35.9 °C)  Pulse:  [] 73  Resp:  [10-60] 12  SpO2:  [77 %-100 %] 97 %  BP: ()/(56-99) 98/67     Weight: 59.9 kg (132 lb 0.9 oz)  Body mass index is 20.68 kg/m².       Physical Exam  Vitals and nursing note reviewed.   Constitutional:       Appearance: He is cachectic. He is ill-appearing.      Interventions: He is sedated. Nasal cannula in place.   HENT:      Head: Atraumatic.      Comments: Temporal wasting   Pulmonary:      Effort: Pulmonary effort is normal. No respiratory distress.   Musculoskeletal:      Comments: Muscular wasting    Neurological:      Mental Status: He is easily aroused. He is lethargic.      Comments: Able to answer some simple questions, attempting to ask questions, more appropriately responding to commands/direction, making eye contact and tracking    Psychiatric:         Behavior: Behavior is cooperative.        Advance Care Planning   Advance Directives:   Living Will: No    LaPOST: No    Do Not Resuscitate Status: Yes    Medical Power of : No (Verbal declaration that son, Emmanuel Grant Jr.,  is preferred MPOA)      Decision Making:  Family answered questions  Goals of Care: What is most important right now is to focus on spending time at home, avoiding the hospital, remaining as independent as possible, symptom/pain control, quality of life, even if it means sacrificing a little time. Accordingly, we have decided that the best plan to meet the patient's goals includes continuing with treatment.     Significant Labs: All pertinent labs within the past 24 hours have been reviewed.  CBC:   Recent Labs   Lab 05/01/25  0511   WBC 7.88   HGB 12.0*   HCT 36.8*   MCV 83        BMP:  Recent Labs   Lab 05/01/25  1243   *      K 3.7      CO2 25   BUN 44*   CREATININE 1.1   CALCIUM 9.5     LFT:  Lab Results   Component Value Date    AST 24 04/29/2025    ALKPHOS 69 04/29/2025    BILITOT 1.2 (H) 04/29/2025     Albumin:   Albumin   Date  Value Ref Range Status   04/29/2025 3.4 (L) 3.5 - 5.2 g/dL Final   03/17/2025 2.9 (L) 3.5 - 5.2 g/dL Final     Protein:   Protein Total   Date Value Ref Range Status   04/29/2025 7.6 6.0 - 8.4 gm/dL Final     Total Protein   Date Value Ref Range Status   03/17/2025 6.7 6.0 - 8.4 g/dL Final     Lactic acid:   Lab Results   Component Value Date    LACTATE 1.5 04/29/2025    LACTATE 1.8 04/03/2025       Significant Imaging: I have reviewed all pertinent imaging results/findings within the past 24 hours.

## 2025-05-01 NOTE — PROGRESS NOTES
[facility] Medicine  Progress Note    Patient Name: Emmanuel Grant  MRN: [id number]  Patient Class: IP- Inpatient   Admission Date: 4/27/2025  Length of Stay: 3 days  Attending Physician: Nargis Parr MD  Primary Care Provider: Wilfredo De Souza MD        Subjective     Principal Problem:Rib fractures        HPI:  Emmanuel Grant is a 73 y/o male with PMHx of HTN, DM2, CVA, CHF, and pulmonary fibrosis with chronic respiratory failure on home oxygen and hospice who presented to ED with right shoulder and rib pain after a fall early this morning.  He states he got up to use the bathroom and he lost his balance.  He landed on his right side and immediately had pain.  He got himself into a chair and called EMS.  No head injury or LOC.  In the ED, he was found to have multiple right-sided rib fractures, CT chest demonstrates multiple nondisplaced and minimally displaced rib fractures are identified including fractures of the 3rd, 4th, 5th, 6th, 7th, and 8th ribs laterally and the 5th rib posteriorly. No pneumothorax and oxygen saturations are 97% on his regular home oxygen of 3L via NC.  He is being admitted for pain control.    Overview/Hospital Course:  Mr Emmanuel Grant is a 74 y.o. man with pulmonary fibrosis on 3L home O2, frequent falls at home, on home hospice who fell, called EMS, and was admitted with R rib fractures 3-8. Started pain control. However, he worsened, becoming febrile, confused, and more hypoxic. He was moved to ICU on 4/29, started antibiotics. Pulmonary, Palliative consulted. His respiratory status has improved. Anesthesia performed BETTY block on 4/30.     Interval History: He was very awake and alert and agitated overnight, received morphine and fentanyl. Today he is more sleepy, but he does wake up. He tells me he is at the [employer]. He says he broke his ribs and he has pain.     Review of Systems   Unable to perform ROS: Mental status change     Objective:      Vital Signs (Most Recent):  Temp: 96.6 °F (35.9 °C) (05/01/25 1145)  Pulse: 86 (05/01/25 1145)  Resp: 19 (05/01/25 1145)  BP: 112/67 (05/01/25 1145)  SpO2: 99 % (05/01/25 1145) Vital Signs (24h Range):  Temp:  [96.5 °F (35.8 °C)-97.1 °F (36.2 °C)] 96.6 °F (35.9 °C)  Pulse:  [] 86  Resp:  [10-60] 19  SpO2:  [78 %-100 %] 99 %  BP: ()/(55-99) 112/67     Weight: 59.9 kg (132 lb 0.9 oz)  Body mass index is 20.68 kg/m².    Intake/Output Summary (Last 24 hours) at 5/1/2025 1259  Last data filed at 5/1/2025 1100  Gross per 24 hour   Intake 92.17 ml   Output 750 ml   Net -657.83 ml         Physical Exam  Vitals and nursing note reviewed.   Constitutional:       Appearance: He is ill-appearing.   Cardiovascular:      Rate and Rhythm: Normal rate and regular rhythm.   Pulmonary:      Effort: Pulmonary effort is normal.      Breath sounds: No rhonchi or rales.      Comments: 3L NC  Abdominal:      General: Bowel sounds are normal.      Palpations: Abdomen is soft.      Tenderness: There is no abdominal tenderness.   Genitourinary:     Comments: camejo  Musculoskeletal:      Right lower leg: No edema.      Left lower leg: No edema.   Skin:     General: Skin is warm and dry.   Neurological:      Mental Status: He is disoriented.               Significant Labs: All pertinent labs within the past 24 hours have been reviewed.    Significant Imaging: I have reviewed all pertinent imaging results/findings within the past 24 hours.      Assessment & Plan  Rib fractures  - due to mechanical fall at home  - pain control with lidocaine patch, scheduled tylenol (not able to swallow it), prn iv morphine, scheduled IV ketorolac  - incentive spirometry if he can wake up to do it  - Anesthesia completed BETTY block on 4/30  - avoiding ativan, precedex PRN for agitation  - respiratory status improved on 4/30 compared to day prior  - continues on 3L HFNC-- now dose  - continue antibiotics for suspected aspiration x5 days  Chronic  diastolic congestive heart failure  Patient has Diastolic (HFpEF) heart failure that is Chronic. On presentation their CHF was well compensated.     Echo 4/3/25    Left Ventricle: The left ventricle is normal in size. Normal wall thickness. Mild global hypokinesis present. There is mildly reduced systolic function with a visually estimated ejection fraction of 45 - 50%.    Right Ventricle: The right ventricle is normal in size. Systolic function is normal.    Aorta: Aortic root is mildly dilated measuring 3.82 cm.    Similar findings noted on prior report dated 3/17/25.    Current Heart Failure Medications  hydrALAZINE injection 10 mg, Every 6 hours PRN, Intravenous    Plan  - Place on telemetry  - he is currently too sedated to take home Rx  - he appears hypovolemic- hold lasix          Type 2 diabetes mellitus without complication, without long-term current use of insulin  Lab Results   Component Value Date    HGBA1C 7.8 (H) 01/02/2025     - lantus 15QD  - glucose checks with low dose SSI  - hold home oral meds  - AG present this AM-- if still present this PM, will start insulin gtt     Essential hypertension  Patient's blood pressure range in the last 24 hours was: BP  Min: 82/55  Max: 152/94.The patient's inpatient anti-hypertensive regimen is listed below:  Current Antihypertensives  hydrALAZINE injection 10 mg, Every 6 hours PRN, Intravenous    Plan  - BP is controlled, no changes needed to their regimen  Coronary artery disease involving 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 Continue to monitor on telemetry.   - note last angiogram 9/2024 with patient LAD stent   - currently unable to swallow asa  BPH (benign prostatic hyperplasia)  - unable to swallow flomax currently  - camejo in place     H/O: CVA (cerebrovascular accident)  - noted     Immunosuppression due to drug  therapy  - for IPF- see IPF    Anemia, chronic disease  Anemia is likely due to chronic blood loss. Most recent hemoglobin and hematocrit are listed below.  Recent Labs     04/29/25  0525 04/30/25  0428 05/01/25  0511   HGB 13.9* 12.6* 12.0*   HCT 42.7 38.7* 36.8*     Plan  - Monitor serial CBC: Daily  - Transfuse PRBC if patient becomes hemodynamically unstable, symptomatic or H/H drops below 7/21.  - Patient has not received any PRBC transfusions to date  - Patient's anemia is currently stable    IPF (idiopathic pulmonary fibrosis)  - appeared at baseline upon admission  - on 3L home O2  - continues on home nintedanib if he can swallow it (not currently)  - holding mycophenolate with concerns for infection   Acute on chronic respiratory failure with hypoxia  Patient admitted with Hypoxic which is Acute on Chronic.  he is on home oxygen at 3 LPM. Signs/symptoms of respiratory failure include- tachypnea and increased work of breathing present on admission.   - Labs and images were reviewed. Patient Has recent ABG, which has been reviewed..   - Supplemental oxygen was provided and noted- 3L NC  - Respiratory failure is due to- Aspiration, Interstitial lung disease, and rib fractures    - rib fractures: pain control, incentive spirometry if he can wake up to do it, BETTY block with anesthesia completed 4/30  - ILD: continue home Rx if he can tolerate but hold mycophenolate with concerns for infection. Continue inhalers  - fever noted, concern for aspiration: started cefepime- continue x5 day course  - has HFpEF but currently appears eu/hypovolemic- hold lasix  - Pulmonary following  - respiratory status improving     Advance care planning  Advance Care Planning     Date: 04/29/2025  - Palliative consulted and discussed with family  - note he is on home hospice  - currently continue care but with limitation- no chest compressions, ok for intubation if needed  - family is flying in town  - time spent discussing with  palliative and reviewing documentation= 5 minutes     Advance Care Planning     Date: 04/30/2025  - palliative met with family again today  - DNR/DNI code status  - time spent reviewing= 5 minutes               Acute metabolic encephalopathy  - worsening on 4/28-29  - suspect due to sedative medications given for pain control  - he needs pain/anxiety control but will try to limit ativan use- precedex instead     Delirium  - see AMS    VTE Risk Mitigation (From admission, onward)           Ordered     enoxaparin injection 30 mg  Every 12 hours         05/01/25 1027     Reason for No Pharmacological VTE Prophylaxis  Once        Question:  Reasons:  Answer:  Risk of Bleeding    04/27/25 1755     IP VTE HIGH RISK PATIENT  Once         04/27/25 1755     Place sequential compression device  Until discontinued         04/27/25 1755                    Discharge Planning   DERIC:      Code Status: DNR   Medical Readiness for Discharge Date:   Discharge Plan A: New Nursing Home placement - [facility] care facility            Critical care time spent on the evaluation and treatment of severe organ dysfunction, review of pertinent labs and imaging studies, discussions with consulting providers and discussions with patient/family: 40 minutes.            Nargis Parr MD  Department of Hospital Medicine   [facility] - Intensive Care

## 2025-05-02 PROCEDURE — 63600175 PHARM REV CODE 636 W HCPCS: Mod: HCNC | Performed by: STUDENT IN AN ORGANIZED HEALTH CARE EDUCATION/TRAINING PROGRAM

## 2025-05-02 NOTE — ASSESSMENT & PLAN NOTE
4/29/25 Given progression of IPF, patient was discharged to home hospice.  Per NP Alfredito, patient activated 911 after fall.  Currently full code.  On going goc discussion with son and palliative care.    4/30/25 spoken with son, Dr. Grant, and he agreed to DNR.  He is also agreeable to home hospice at the time of discharge.  Unclear if patient will still be living in [place] after discharge.   is working to explore various options available.    5/2/25 most  likely nursing home upon discharge.

## 2025-05-02 NOTE — ASSESSMENT & PLAN NOTE
Anemia is likely due to chronic blood loss. Most recent hemoglobin and hematocrit are listed below.  Recent Labs     04/30/25  0428 05/01/25  0511 05/02/25  0348   HGB 12.6* 12.0* 12.0*   HCT 38.7* 36.8* 37.9*     Plan  - Monitor serial CBC: Daily  - Transfuse PRBC if patient becomes hemodynamically unstable, symptomatic or H/H drops below 7/21.  - Patient has not received any PRBC transfusions to date  - Patient's anemia is currently stable

## 2025-05-02 NOTE — PROGRESS NOTES
[facility] - Intensive Care  Palliative Medicine  Progress Note    Patient Name: Emmanuel Grant  MRN: [id number]  Admission Date: 4/27/2025  Hospital Length of Stay: 4 days  Code Status: DNR   Attending Provider: Nargis Parr MD  Consulting Provider: Janis Glaser NP  Primary Care Physician: Wilfredo De Souza MD  Principal Problem:Rib fractures    Patient information was obtained from relative(s) and primary team.      Assessment/Plan:     Neuro  Delirium  - see encephalopathy, rib fracture, and ACP     Acute metabolic encephalopathy  - improving; discussed with bedside RN and later with MDT trial of taking off restraints with pt now more cooperative   - at baseline in prior hospitalizations pt did not even like to stay in his hospital room during the day, would sit in bedside chair in benitez and request to walk with walker in the benitez; restraints could complicate frustration and mood   - cont'd precedex for delirium; also discussed Zyrexa as alternative to Seroquel to aid in delirium day/night confusion until PO is possible, which is now ordered nightly per    - HS delirium is complicating assessment of necessary opioid regimen as increased physical movement is leading to need for increased opioid usage, unfortunately resulting in sedation throughout the day and continuing day/night confusion   - attempt to wean from precedex 5/2 not successful; see rib fx     Pulmonary  Acute on chronic respiratory failure with hypoxia  - pt with multiple related admissions; typically associated with encephalopathy on initial admission   - pt has been under the care of hospice for this but has been very resistant to symptom management with opioids to avoid severe exacerbations   - overall resp status improved to baseline, continued concerns for worsening in the near future given risk for PNA r/t aspiration and rib fracture guarding/shallow breathing     IPF (idiopathic pulmonary fibrosis)  - pt with long term hx of  IPF/ILD; pt and son with good insight into pt's condition   - in prior admission, discussed trajectory of life limiting condition with pt, along with Dr. Ayanna BROWN who reviewed worsening condition on multiple CT images with pt   - see resp failure       Cardiac/Vascular  Chronic diastolic congestive heart failure  - chronic condition noted  - contributes to pt's overall condition and hospice qualification; contributes to pt's lack of reserve   - echo's have been relatively stable with current EF 50-45%  - discussed trajectory of life-limiting condition with pt during prior admissions     Orthopedic  * Rib fractures  - 2/2 fall at home; during prior admissions pt shared fall history and weakness which has lead to discussion with pt and son regarding concern for safety of pt continuing to live at home  - pt on home hospice prior to admission; hospice team confirms continued recommendation for [facility] placement/need for increased pt support   - pt not able to report characteristics or severity of pain during AM assessment due to AMS/delirium; pt moaning, shouting, restless PRN morphine 4mg and ativan 1 mg needed to achieve pt comfort, still with some break through milder moaning and restlessness   - 4/29 - complicated pain management due to respiratory status and mental status (unable to tolerate prior ordered PO meds); discussed consideration of IV toradol or IV acetaminophen as adjunct to opioid regimen in attempts to decrease opioid needs; also discussed consideration of consulting anesthesia for eval of regional block   - 4/30 - plan for regional block with anesthesia today; pt with improved pain control and plan for limiting opioids as possible due to AMS and resp status   - 5/1 - multiple MDT discussed regarding approach/plan for pain management moving forward; Toradol and regional block seem to be improving pain management, but are only temporary options; unsafe for PO complicating non-opioid regimen options;  day/night confusion and delirium (increased movements etc) increasing pt's pain med needs/administration during evening/night; current plan is to optimize delirium prevention and treatment in and effort to minimize need for excessive opioids to determine appropriate pain management plan that will not complicate delirium but also provide adequate pain control; improving mental status will allow for PO options in pain regimen   - 5/2 - continued goals as 5/1 but with plan to attempt to wean from precedex to attempt to overcome delirium and work on a better day/night routine, after another night requiring PRN pain and delirium treatment; delirium continues to complicate pain management; options discussed with family for continuing with current plan with transparency that it is likely not going to be effective as demonstrated so far, consideration of transition to inpatient hospice with plan for focus on comfort with accepting pt may require a regimen that will result in sedation, or as offered by  to explore option of transfer [place] [facility] [place]ural (see ACP)      Palliative Care  Advance care planning  5/2/2025   - interval chart reviewed; discussed pt with MDT during ICU rounds   - met with pt, son, and ex-wife at bedside multiple times throughout the day; pt continues to lack capacity for medical decision making   - discussions with ex-wife and son, Félix, throughout the day providing updates in team goals and plan of care   - transparent discussions regarding concerns for inability to balance pt's delirium, pain management, safety, and honoring family's goal to limit sedation and opioids; shared concerns that pt will continue on poor day/night cycle and periods of activity that require sedation, especially given inability to wean precedex   - also transparently shared my concern for pt to suffer complications of pneumonia this admission due to aspiration 2/2 delirium or due to movement restrictions 2/2 pain  "and bed bound status; transparently discussed that given pt's baseline lung function, pna would likely be terminal; family expressed good insight into all of this   - HM offered consideration of discussion with [facility]/transfer center for transfer to [facility] for thoracic epidural; discussed limiting factors for pt including delirium potentially complication qualification for procedure and risks of relocation for worsening delirium; family did wish to explore if pt would be a candidate for this; HM to discuss with C; family would need to further discuss with pt's preferred MPOA, Emmanuel Rojas "CAIT"   - discussed alternative plan for consideration of transition to inpatient hospice at the hospital with pt's prior home hospice team, Heather, to allow shift of focus to pt comfort with hope pt could tolerate transfer to [facility] placement with potential for that environment being more conducive to overcoming delirium; [facility] team and inpatient hospice unit available at [facility] as well if transfer does occur and GOC aligns for inpatient hospice at any point following    - discussed pt's status and potential need for inpatient hospice transition with [facility] team to aid in smooth transition if indicated/agreed on by family  - attempted to call pt's son CAIT to share above discussions and concerns, LM and return contact info as he is likely flying back home at this time, Félix to also discuss with RJ   - multiple discussions with family and MDT throughout the day, multiple pt symptom assessments   - emotional support provided to family and pt; answered all questions      5/1/2025   - interval chart reviewed; discussed pt with MDT during ICU rounds   - met with pt and 2/3 sons at bedside; pt with gradually improving mental status but remains unable to participate in GOC/ACP discussions on his own behalf   - ongoing GOC/ACP discussions with sons and ongoing updates/discussions on MDT plans for symptom management and plan of care "   - sons working with CM and family to formulate a plan for care following hospitalization   - ultimate plan is for relocation to [place] following hospitalization; family with good insight that this will be a complex task  - recommended consideration of short term local [facility] plan as well to allow for pt to recover more from rib fractures while getting support and care through hospice to aid in planning and resources of relocation   - emotional support provided; answered all questions   - Emmanuel Rojas is returning home 5/2 AM, other son with plans to remain local until Wed at this time   - ongoing communication and coordination with MDT     4/30/2025   - interval chart reviewed; discussed pt with MDT during ICU rounds   - met with pt, and son along with Dr. Urena, [facility]; pt continues to lack capacity to participate in discussions/decisions   - Son advocates for pt to be DNR/DNI now that he is present and has good insight into this decision and feels it aligns with pt's wishes   - pt's other sons and their mother are also traveling to be here to see pt and assist in GOC/ACP discussions/plans   - overall insight and plan that pt will not be able to return to living independently; exploring options of [facility] placement with hospice in the local area or in [place] with pt's other 2 sons   - Dr. Urena provided update and overview of pt's resp hx/status and current inpatient plan of care; son with good insight as he is a trauma surgeon   - son shared that pt has always been very private and has refused to relocate with prior attempts to aid in care  - son has requested to meet with  to learn more about [facility] and relocation processes and pt's benefits; update CM of this request   - emotional support provided; answered all questions   - updated MDT     4/29/2025 - Consult   - consult received; interval chart reviewed in detail; discussed pt with MDT during ICU rounds   - pt known to myself and palliative medicine  team from previous admission; pt on hospice with [facility] prior to this admission   - discussed current hospice plan of care with hospice team; hospice team has had ongoing [facility] discussions with pt and family regarding code status as well as need for long-term care vs living with family, which has not been successful; pt has been fearful of resp symptom management with opioids  - hospice team will likely require some sort of long-term care to resume services outside of hospital if pt condition approves or can provide inpatient hospice care if needed   - pt currently with severe encephalopathy and doesn't not have capacity for medical decision making   - spoke with son, Dr. Emmanuel Gratn Jr; brief medical updated provided, encouraged his plan to fly to [facility] due to pt's condition ([place] of state)   - reviewed code status; son shares that no formal decision had been made with pt regarding code status; they discussed clear wishes for no chest compressions so son was agreeable to partial code with the chest compressions; son shared pt would likely not wish to be intubated as they have insight that pt being able to be extubated/improve would be unlikely, would not wish for long term life support; however, given circumstances and pt's respiratory status son wishes for intubation if indicated to allow for family to travel to pt   - son plans to take first flight to [facility] today with expected arrival approx 5pm unless flight schedules change   - reviewed current discussed plan of care from MDT and encouraged that the plan would attempt to limit opioids and benzos as possible, son with good insight that pt's comfort and safety would take precedent in interventions   - emotional support provided; answered all questions  - multiple assessments of pt throughout the day to assess symptom management   - ongoing discussions and communication with MDT         I will follow-up with patient. Please contact us if you have any  "additional questions.    Subjective:     Chief Complaint:   Chief Complaint   Patient presents with    Fall     Arrived via [facility] after slip and fall this morning. Denies any LOC or hitting head. Complaining of R arm and R thoracic pain r/t landing on R side. No obvious deformities or signs of distress. On hospice for pulm fibrosis. On 3L nasal cannula at home.        HPI:   From H&P: "Patient is 74 y.o. male  has a past medical history of Acute hypoxic respiratory failure (09/23/2024), BPH (benign prostatic hyperplasia) (02/28/2024), CAD (coronary artery disease), Chronic HFrEF (heart failure with reduced ejection fraction) (05/01/2024), Diabetes mellitus, Hypertension, Kidney stone, Stroke, and Type 2 diabetes mellitus without complication, without long-term current use of insulin (10/08/2021). presented to [facility] on 4/28/25 s/p fall with shoulder and rib pain.  Cxr with multiple nondisplaced right rib fractures and patient was admitted for pain control.  Patient with worsening confusion along with fever over night and was transferred to ICU.       Patient was followed by Dr. Schmitt as an outpatient for IPF.  He was seen by Dr. Mix at [facility].  Patient was started on OFEV along with cellcept along with weaning regimen of prednisone.  Per Dr. Schmitt last note, patient was discharged with hospice.  Patient activated 911 after fall.       During my initial evaluation, patient was somnolent and groaning persistently.  HR 120s.  Bp stable.  Sating well on high flow nasal canula at 12 lpm. "     Palliative medicine consulted for goals of care discussion and advance care planning; for details of visit, see advance care planning section of plan.      Hospital Course:  No notes on file    Medications:  Continuous Infusions:   dexmedeTOMIDine (Precedex) infusion (titrating)  0-1.4 mcg/kg/hr Intravenous Continuous 11.98 mL/hr at 05/02/25 1541 0.8 mcg/kg/hr at 05/02/25 1541     Scheduled Meds:   acetaminophen  650 " mg Oral Q6H    arformoteroL  15 mcg Nebulization BID    aspirin  81 mg Oral Daily    budesonide  1 mg Nebulization BID    ceFEPime IV (PEDS and ADULTS)  2 g Intravenous Q8H    enoxparin  30 mg Subcutaneous Q12H (prophylaxis, 0900/2100)    insulin glargine U-100  15 Units Subcutaneous Daily    latanoprost  1 drop Both Eyes QHS    LIDOcaine  3 patch Transdermal Q24H    mupirocin   Nasal BID    nintedanib  150 mg Oral BID    QUEtiapine  25 mg Oral QHS    tiZANidine  2 mg Oral Q8H     PRN Meds:  Current Facility-Administered Medications:     albuterol-ipratropium, 3 mL, Nebulization, Q4H PRN    dextrose 50%, 12.5 g, Intravenous, PRN    dextrose 50%, 25 g, Intravenous, PRN    diphenhydrAMINE, 25 mg, Oral, Q6H PRN    fentaNYL, 25 mcg, Intravenous, Q3H PRN    glucagon (human recombinant), 1 mg, Intramuscular, PRN    glucose, 16 g, Oral, PRN    glucose, 24 g, Oral, PRN    hydrALAZINE, 10 mg, Intravenous, Q6H PRN    insulin aspart U-100, 0-10 Units, Subcutaneous, Q6H PRN    lorazepam, 1 mg, Intravenous, Q4H PRN    morphine, 3 mg, Intravenous, Q4H PRN    naloxone, 0.02 mg, Intravenous, PRN    ondansetron, 4 mg, Intravenous, Q8H PRN    polyethylene glycol, 17 g, Oral, Daily PRN    promethazine-codeine 6.25-10 mg/5 ml, 5 mL, Oral, Q4H PRN    simethicone, 80 mg, Oral, TID PRN    sodium chloride 0.9%, 10 mL, Intravenous, PRN    traZODone, 50 mg, Oral, Nightly PRN    Objective:     Vital Signs (Most Recent):  Temp: 98.6 °F (37 °C) (05/02/25 1100)  Pulse: 110 (05/02/25 1515)  Resp: (!) 22 (05/02/25 1515)  BP: (!) 160/93 (05/02/25 1515)  SpO2: 97 % (05/02/25 1515) Vital Signs (24h Range):  Temp:  [97.6 °F (36.4 °C)-98.6 °F (37 °C)] 98.6 °F (37 °C)  Pulse:  [] 110  Resp:  [11-44] 22  SpO2:  [67 %-100 %] 97 %  BP: ()/() 160/93     Weight: 59.9 kg (132 lb 0.9 oz)  Body mass index is 20.68 kg/m².       Physical Exam  Vitals and nursing note reviewed.   Constitutional:       Appearance: He is cachectic. He is  ill-appearing.      Interventions: He is sedated. Nasal cannula in place.      Comments: Ongoing delirium; varied AMS through out the day    HENT:      Head: Atraumatic.      Comments: Temporal wasting   Pulmonary:      Effort: Pulmonary effort is normal. No respiratory distress.   Musculoskeletal:      Comments: Muscular wasting    Neurological:      Mental Status: He is easily aroused. He is lethargic and disoriented.      Comments: Waxing and waning of orientation and delirium; at times able to follow simple commands but more often not; during better moments could identify ex wife and son at bedside, and provide his own name; at times difficult to redirect    Psychiatric:         Behavior: Behavior is cooperative.        Advance Care Planning   Advance Directives:   Living Will: No    LaPOST: No    Do Not Resuscitate Status: Yes    Medical Power of : No (Verbal declaration that son, Emmanuel Grant Jr.,  is preferred MPOA)      Decision Making:  Family answered questions  Goals of Care: What is most important right now is to focus on spending time at home, avoiding the hospital, remaining as independent as possible, symptom/pain control, quality of life, even if it means sacrificing a little time. Accordingly, we have decided that the best plan to meet the patient's goals includes continuing with treatment.     Significant Labs: All pertinent labs within the past 24 hours have been reviewed.  CBC:   Recent Labs   Lab 05/02/25 0348   WBC 6.64   HGB 12.0*   HCT 37.9*   MCV 86        BMP:  Recent Labs   Lab 05/02/25 0348   *      K 3.5      CO2 23   BUN 41*   CREATININE 0.9   CALCIUM 9.7     LFT:  Lab Results   Component Value Date    AST 24 04/29/2025    ALKPHOS 69 04/29/2025    BILITOT 1.2 (H) 04/29/2025     Albumin:   Albumin   Date Value Ref Range Status   04/29/2025 3.4 (L) 3.5 - 5.2 g/dL Final   03/17/2025 2.9 (L) 3.5 - 5.2 g/dL Final     Protein:   Protein Total   Date  Value Ref Range Status   04/29/2025 7.6 6.0 - 8.4 gm/dL Final     Total Protein   Date Value Ref Range Status   03/17/2025 6.7 6.0 - 8.4 g/dL Final     Lactic acid:   Lab Results   Component Value Date    LACTATE 1.5 04/29/2025    LACTATE 1.8 04/03/2025       Significant Imaging: I have reviewed all pertinent imaging results/findings within the past 24 hours.    Total visit time: 125 minutes    35 min visit time including: face to face time in discussion of symptom assessment, and exploring options and burdens of offered treatments.  This also includes non-face to face time preparing to see the patient including chart review, obtaining and/or reviewing separately obtained history, documenting clinical information in the electronic or other health record, independently interpreting results and communicating results to the patient/family/caregiver, family discussions by phone if not able to be present, coordination of care with other specialists, and discharge planning.     90 min ACP time spent: goals of care, advanced care planning, emotional support, formulating and communicating prognosis, exploring burden/ benefit of various approaches of treatment.     Janis Glaser NP  Palliative Medicine  [facility] [place] - Intensive Care

## 2025-05-02 NOTE — NURSING
[facility] [place]  Nurses Note -- 4 Eyes      5/2/2025       Skin assessed on: Q Shift      [x] No Pressure Injuries Present    [x]Prevention Measures Documented    [] Yes LDA  for Pressure Injury Previously documented     [] Yes New Pressure Injury Discovered   [] LDA for New Pressure Injury Added      Attending RN:  Beth Langley RN     Second:  MARKOS Johnson

## 2025-05-02 NOTE — ASSESSMENT & PLAN NOTE
Lab Results   Component Value Date    HGBA1C 7.9 (H) 05/01/2025     - lantus 15QD  - glucose checks with low dose SSI  - hold home oral meds

## 2025-05-02 NOTE — ASSESSMENT & PLAN NOTE
Patient's blood pressure range in the last 24 hours was: BP  Min: 96/68  Max: 178/135.The patient's inpatient anti-hypertensive regimen is listed below:  Current Antihypertensives  hydrALAZINE injection 10 mg, Every 6 hours PRN, Intravenous    Plan  - BP is controlled, no changes needed to their regimen

## 2025-05-02 NOTE — ASSESSMENT & PLAN NOTE
- worsening on 4/28-29  - suspect due to sedative medications given for pain control  - he needs pain/anxiety control but will try to limit ativan use  - schedule seroquel QHS

## 2025-05-02 NOTE — NURSING
Patient continues to holler out in pain - has been medicated with almost every medication on his mar and none are effective - patient holds his breath - and moans - diaphoretic - cool cloth to the head - offered water mouth is dry - but once again cursed me out - he is tachycardic - and hypertensive - tachypnea- I am unable to provide adequate pain control -  MD and NP aware of the situation - will continue to monitor

## 2025-05-02 NOTE — NURSING
Family at the bedside to assist with help keeping patient calm and in the bed - he is oriented to self but then says something that makes no sense and becomes aggressive - skin is tanned with ecchymotic areas noted - remains in bilateral wrists restraints - continues to attempt to pull out medical devices - remains hypertensive when awake and hollering - Dr BRADSHAW aware - once calm directly after medication his b/p is within ordered parameters - camejo cath intact -a coude cath ?- sufficient urine output - cloudy at times - afebrile - kicking legs over the rails - squirming in the bed - took a few sips of broth and water - bed alarm on

## 2025-05-02 NOTE — ASSESSMENT & PLAN NOTE
Patient admitted with Hypoxic which is Acute on Chronic.  he is on home oxygen at 3 LPM. Signs/symptoms of respiratory failure include- tachypnea and increased work of breathing present on admission.   - Labs and images were reviewed. Patient Has recent ABG, which has been reviewed..   - Supplemental oxygen was provided and noted- 3L NC  - Respiratory failure is due to- Aspiration, Interstitial lung disease, and rib fractures   - rib fractures: pain control, incentive spirometry if he can wake up to do it, BETTY block with anesthesia completed 4/30   - changed tylenol to liquid   - added scheduled tizanidine with goals to get off morphine  - ILD: continue home Rx if he can tolerate but hold mycophenolate with concerns for infection. Continue inhalers  - fever noted, concern for aspiration: started cefepime- continue x5 day course  - has HFpEF but currently appears eu/hypovolemic- hold lasix  - Pulmonary following  - respiratory status improving

## 2025-05-02 NOTE — NURSING
[facility]  Nurses Note -- 4 Eyes      5/2/2025       Skin assessed on: Q Shift      [x] No Pressure Injuries Present    [x]Prevention Measures Documented    [] Yes LDA  for Pressure Injury Previously documented     [] Yes New Pressure Injury Discovered   [] LDA for New Pressure Injury Added      Attending RN:  Subha Sahni RN     Second RN:  Beth CHAU

## 2025-05-02 NOTE — ASSESSMENT & PLAN NOTE
- due to mechanical fall at home  - pain control with lidocaine patch, scheduled tylenol liquid, scheduled tizanidine, prn iv morphine  - completed course of ketorolac  - incentive spirometry if he can wake up to do it  - Anesthesia completed BETTY block on 4/30  - avoiding ativan, precedex PRN for agitation, seroquel QHS for delirium   - respiratory status improved, at baseline 3L  - continue antibiotics for suspected aspiration x5 days

## 2025-05-02 NOTE — PLAN OF CARE
"Patient remains in ICU. Only oriented to self, can state the year at times but completely disoriented to place and situation. Pt repeats same questions over and over despite reorientation. Pt with paranoia.  PRN Ativan given x1 for non redirectable agitation. Precedex able to be weaned down. PRN morphine given x1 for pre medication for bath, pt attempts to hit and kick any time he is turned or care is administered in any way. Pt screams to stop and will not allow his mouth to be cleaned. Attempts were made to give patient water and clear liquids but patient stated that we are "putting dirt in his mouth" and we are giving him "dirty water". NSR on cardiac monitor, tachycardia when patient extremely agitated up to 120s. 3L NC continued. Afebrile. Antonio in place. No BM. No falls, injury, or skin breakdown. Soft wrist restrains in use due to patient continuing to pull at lines and medical devices. Plan of care reviewed and care ongoing.   "

## 2025-05-02 NOTE — SUBJECTIVE & OBJECTIVE
Medications:  Continuous Infusions:   dexmedeTOMIDine (Precedex) infusion (titrating)  0-1.4 mcg/kg/hr Intravenous Continuous 11.98 mL/hr at 05/02/25 1541 0.8 mcg/kg/hr at 05/02/25 1541     Scheduled Meds:   acetaminophen  650 mg Oral Q6H    arformoteroL  15 mcg Nebulization BID    aspirin  81 mg Oral Daily    budesonide  1 mg Nebulization BID    ceFEPime IV (PEDS and ADULTS)  2 g Intravenous Q8H    enoxparin  30 mg Subcutaneous Q12H (prophylaxis, 0900/2100)    insulin glargine U-100  15 Units Subcutaneous Daily    latanoprost  1 drop Both Eyes QHS    LIDOcaine  3 patch Transdermal Q24H    mupirocin   Nasal BID    nintedanib  150 mg Oral BID    QUEtiapine  25 mg Oral QHS    tiZANidine  2 mg Oral Q8H     PRN Meds:  Current Facility-Administered Medications:     albuterol-ipratropium, 3 mL, Nebulization, Q4H PRN    dextrose 50%, 12.5 g, Intravenous, PRN    dextrose 50%, 25 g, Intravenous, PRN    diphenhydrAMINE, 25 mg, Oral, Q6H PRN    fentaNYL, 25 mcg, Intravenous, Q3H PRN    glucagon (human recombinant), 1 mg, Intramuscular, PRN    glucose, 16 g, Oral, PRN    glucose, 24 g, Oral, PRN    hydrALAZINE, 10 mg, Intravenous, Q6H PRN    insulin aspart U-100, 0-10 Units, Subcutaneous, Q6H PRN    lorazepam, 1 mg, Intravenous, Q4H PRN    morphine, 3 mg, Intravenous, Q4H PRN    naloxone, 0.02 mg, Intravenous, PRN    ondansetron, 4 mg, Intravenous, Q8H PRN    polyethylene glycol, 17 g, Oral, Daily PRN    promethazine-codeine 6.25-10 mg/5 ml, 5 mL, Oral, Q4H PRN    simethicone, 80 mg, Oral, TID PRN    sodium chloride 0.9%, 10 mL, Intravenous, PRN    traZODone, 50 mg, Oral, Nightly PRN    Objective:     Vital Signs (Most Recent):  Temp: 98.6 °F (37 °C) (05/02/25 1100)  Pulse: 110 (05/02/25 1515)  Resp: (!) 22 (05/02/25 1515)  BP: (!) 160/93 (05/02/25 1515)  SpO2: 97 % (05/02/25 1515) Vital Signs (24h Range):  Temp:  [97.6 °F (36.4 °C)-98.6 °F (37 °C)] 98.6 °F (37 °C)  Pulse:  [] 110  Resp:  [11-44] 22  SpO2:  [67 %-100 %]  97 %  BP: ()/() 160/93     Weight: 59.9 kg (132 lb 0.9 oz)  Body mass index is 20.68 kg/m².       Physical Exam  Vitals and nursing note reviewed.   Constitutional:       Appearance: He is cachectic. He is ill-appearing.      Interventions: He is sedated. Nasal cannula in place.      Comments: Ongoing delirium; varied AMS through out the day    HENT:      Head: Atraumatic.      Comments: Temporal wasting   Pulmonary:      Effort: Pulmonary effort is normal. No respiratory distress.   Musculoskeletal:      Comments: Muscular wasting    Neurological:      Mental Status: He is easily aroused. He is lethargic and disoriented.      Comments: Waxing and waning of orientation and delirium; at times able to follow simple commands but more often not; during better moments could identify ex wife and son at bedside, and provide his own name; at times difficult to redirect    Psychiatric:         Behavior: Behavior is cooperative.        Advance Care Planning   Advance Directives:   Living Will: No    LaPOST: No    Do Not Resuscitate Status: Yes    Medical Power of : No (Verbal declaration that sonEmmanuel Jr.,  is preferred MPOA)      Decision Making:  Family answered questions  Goals of Care: What is most important right now is to focus on spending time at home, avoiding the hospital, remaining as independent as possible, symptom/pain control, quality of life, even if it means sacrificing a little time. Accordingly, we have decided that the best plan to meet the patient's goals includes continuing with treatment.     Significant Labs: All pertinent labs within the past 24 hours have been reviewed.  CBC:   Recent Labs   Lab 05/02/25  0348   WBC 6.64   HGB 12.0*   HCT 37.9*   MCV 86        BMP:  Recent Labs   Lab 05/02/25  0348   *      K 3.5      CO2 23   BUN 41*   CREATININE 0.9   CALCIUM 9.7     LFT:  Lab Results   Component Value Date    AST 24 04/29/2025    ALKPHOS 69  04/29/2025    BILITOT 1.2 (H) 04/29/2025     Albumin:   Albumin   Date Value Ref Range Status   04/29/2025 3.4 (L) 3.5 - 5.2 g/dL Final   03/17/2025 2.9 (L) 3.5 - 5.2 g/dL Final     Protein:   Protein Total   Date Value Ref Range Status   04/29/2025 7.6 6.0 - 8.4 gm/dL Final     Total Protein   Date Value Ref Range Status   03/17/2025 6.7 6.0 - 8.4 g/dL Final     Lactic acid:   Lab Results   Component Value Date    LACTATE 1.5 04/29/2025    LACTATE 1.8 04/03/2025       Significant Imaging: I have reviewed all pertinent imaging results/findings within the past 24 hours.

## 2025-05-02 NOTE — ASSESSMENT & PLAN NOTE
- 2/2 fall at home; during prior admissions pt shared fall history and weakness which has lead to discussion with pt and son regarding concern for safety of pt continuing to live at home  - pt on home hospice prior to admission; hospice team confirms continued recommendation for halfway placement/need for increased pt support   - pt not able to report characteristics or severity of pain during AM assessment due to AMS/delirium; pt moaning, shouting, restless PRN morphine 4mg and ativan 1 mg needed to achieve pt comfort, still with some break through milder moaning and restlessness   - 4/29 - complicated pain management due to respiratory status and mental status (unable to tolerate prior ordered PO meds); discussed consideration of IV toradol or IV acetaminophen as adjunct to opioid regimen in attempts to decrease opioid needs; also discussed consideration of consulting anesthesia for eval of regional block   - 4/30 - plan for regional block with anesthesia today; pt with improved pain control and plan for limiting opioids as possible due to AMS and resp status   - 5/1 - multiple MDT discussed regarding approach/plan for pain management moving forward; Toradol and regional block seem to be improving pain management, but are only temporary options; unsafe for PO complicating non-opioid regimen options; day/night confusion and delirium (increased movements etc) increasing pt's pain med needs/administration during evening/night; current plan is to optimize delirium prevention and treatment in and effort to minimize need for excessive opioids to determine appropriate pain management plan that will not complicate delirium but also provide adequate pain control; improving mental status will allow for PO options in pain regimen   - 5/2 - continued goals as 5/1 but with plan to attempt to wean from precedex to attempt to overcome delirium and work on a better day/night routine, after another night requiring PRN pain and  delirium treatment; delirium continues to complicate pain management; options discussed with family for continuing with current plan with transparency that it is likely not going to be effective as demonstrated so far, consideration of transition to inpatient hospice with plan for focus on comfort with accepting pt may require a regimen that will result in sedation, or as offered by [facility] to explore option of transfer to [facility] for thoracic epidural (see ACP)

## 2025-05-02 NOTE — ASSESSMENT & PLAN NOTE
EF 40%  Difficulty to appreciate pulmonary edema on cxr given baseline pft.  Clinical exam without overt evidence of volume overload  Bnp 400.  Lasix 40 mg iv x 1 given.  Lasix held given bump in creatinine and improved oxygenation.

## 2025-05-02 NOTE — NURSING
Janis NP paged regarding patients aggressive behavior towards all and anxiety - Dr BRADSHAW made aware as well- continues to pull at lines - IM  Zyprexa  given - attempted to give po Zanaflex crushed with applesauce he spit some out at me and then proceeded to curse me out - therefore po medications were not given -

## 2025-05-02 NOTE — PROGRESS NOTES
[facility] [place] [facility]ensive [facility]  Pulmonology  Progress Note    Patient Name: Emmanuel Grant  MRN: [id number]  Admission Date: 4/27/2025  Hospital Length of Stay: 4 days  Code Status: DNR  Attending Provider: Nargis Parr MD  Primary Care Provider: Wilfredo De Souza MD   Principal Problem: Rib fractures    Subjective:     Interval History: agitated over night requiring morphine and ativan.  Alert and confused this am.        Objective:     Vital Signs (Most Recent):  Temp: 98.6 °F (37 °C) (05/02/25 1100)  Pulse: 102 (05/02/25 1245)  Resp: (!) 32 (05/02/25 1245)  BP: (!) 167/102 (05/02/25 1245)  SpO2: (!) 94 % (05/02/25 1245) Vital Signs (24h Range):  Temp:  [96.7 °F (35.9 °C)-98.6 °F (37 °C)] 98.6 °F (37 °C)  Pulse:  [] 102  Resp:  [11-41] 32  SpO2:  [67 %-100 %] 94 %  BP: ()/() 167/102     Weight: 59.9 kg (132 lb 0.9 oz)  Body mass index is 20.68 kg/m².      Intake/Output Summary (Last 24 hours) at 5/2/2025 1430  Last data filed at 5/2/2025 1000  Gross per 24 hour   Intake 616.4 ml   Output 730 ml   Net -113.6 ml        Physical Exam  Vitals and nursing note reviewed.   Constitutional:       Appearance: He is ill-appearing.   Cardiovascular:      Rate and Rhythm: Regular rhythm. Tachycardia present.   Pulmonary:      Comments: Stable on nasal canula.   Abdominal:      General: Bowel sounds are normal.      Palpations: Abdomen is soft.      Tenderness: There is no abdominal tenderness.   Genitourinary:     Comments: camejo  Musculoskeletal:      Right lower leg: No edema.      Left lower leg: No edema.   Skin:     General: Skin is warm and dry.   Neurological:      Mental Status: Mental status is at baseline.      Comments: Arousable but confuse.             Review of Systems    Vents:  Oxygen Concentration (%): 28 (05/02/25 0814)    Lines/Drains/Airways       Drain  Duration                  Urethral Catheter 04/28/25 2230 Coude 14 Fr. 3 days              Peripheral Intravenous Line  Duration           "        Peripheral IV - Single Lumen 04/29/25 1300 22 G Anterior;Left Forearm 3 days         Peripheral IV - Single Lumen 04/30/25 1400 22 G Anterior;Distal;Right Upper Arm 2 days                    Significant Labs:    CBC/Anemia Profile:  Recent Labs   Lab 05/01/25  0511 05/02/25  0348   WBC 7.88 6.64   HGB 12.0* 12.0*   HCT 36.8* 37.9*    164   MCV 83 86   RDW 14.5 14.4        Chemistries:  Recent Labs   Lab 05/01/25  0511 05/01/25  1243 05/02/25  0348    145 144   K 4.4 3.7 3.5    105 108   CO2 19* 25 23   BUN 43* 44* 41*   CREATININE 1.1 1.1 0.9   CALCIUM 9.4 9.5 9.7     BNP  Recent Labs   Lab 04/29/25  1206   *         ABGs:   No results for input(s): "PH", "PCO2", "HCO3", "POCSATURATED", "BE" in the last 48 hours.    Blood Culture: No results for input(s): "LABBLOO" in the last 48 hours.  Respiratory Culture: No results for input(s): "GSRESP", "RESPIRATORYC" in the last 48 hours.  Echo 4/3/25       Left Ventricle: The left ventricle is normal in size. Normal wall thickness. Mild global hypokinesis present. There is mildly reduced systolic function with a visually estimated ejection fraction of 45 - 50%.    Right Ventricle: The right ventricle is normal in size. Systolic function is normal.    Aorta: Aortic root is mildly dilated measuring 3.82 cm.    Similar findings noted on prior report dated 3/17/25.    Significant Imaging:   I have reviewed all pertinent imaging results/findings within the past 24 hours.  CT: I have reviewed all pertinent results/findings within the past 24 hours and my personal findings are:  4/28/25 no overt ggo.  +patchy septal reticulation and honeycombing predominantly at bases.  This is similar to findings on 3/30/25 CT but has worsen when compared to CTA 7/7/23  CXR: I have reviewed all pertinent results/findings within the past 24 hours and my personal findings are:  4/29/25 increase reticular markings.  No overt consolidation    ABG  Recent Labs   Lab " 04/29/25  1559   PH 7.381   PO2 66*   PCO2 46.3*   HCO3 27.4   BE 2     Assessment/Plan:     Neuro  Delirium  Sleepy during the day and agitated at night.  Unable to tolerate oral seroquel, zyprexa iv.        Pulmonary  Acute on chronic respiratory failure with hypoxia  Hypoxemic requiring high flow nasal canula  Suspect a combination of baseline IPF + atelectatic changes a/w rib fracture +/- volume overload  Will monitor for aspiration given mental status.  Back to nasal canula.        IPF (idiopathic pulmonary fibrosis)  Followed by Dr. Schmitt as an outpatient  Do not have baseline pft due to poor cooperation  Home oxygen  Was on ofev and cellcept prior to hospice.    Repeat ct chest without acute changes.    Resume ofev and cellcept once tolerating po intake.     Cardiac/Vascular  Chronic diastolic congestive heart failure  EF 40%  Difficulty to appreciate pulmonary edema on cxr given baseline pft.  Clinical exam without overt evidence of volume overload  Bnp 400.  Lasix 40 mg iv x 1 given.  Lasix held given bump in creatinine and improved oxygenation.      Endocrine  Type 2 diabetes mellitus without complication, without long-term current use of insulin  Hyperglycemia with  with anion gap of 19.  Beta hydroxybutyrate elevated  Lantus increase by Dr. Parr  Gap closed this am.      Palliative Care  Advance care planning  4/29/25 Given progression of IPF, patient was discharged to home hospice.  Per ANABELA Glaser, patient activated 911 after fall.  Currently full code.  On going goc discussion with son and palliative care.    4/30/25 spoken with son, Dr. Grant, and he agreed to DNR.  He is also agreeable to home hospice at the time of discharge.  Unclear if patient will still be living in [facility] after discharge.   is working to explore various options available.    5/2/25 most  likely nursing home upon discharge.             Pino Urena MD  Pulmonology  [facility] - Intensive Care    Will be  available upon request.

## 2025-05-02 NOTE — PROGRESS NOTES
[facility] Medicine  Progress Note    Patient Name: Emmanuel Grant  MRN: [id number]  Patient Class: IP- Inpatient   Admission Date: 4/27/2025  Length of Stay: 4 days  Attending Physician: Nargis aPrr MD  Primary Care Provider: Wilfredo De Souza MD        Subjective     Principal Problem:Rib fractures        HPI:  Emmanuel Grant is a 75 y/o male with PMHx of HTN, DM2, CVA, CHF, and pulmonary fibrosis with chronic respiratory failure on home oxygen and hospice who presented to ED with right shoulder and rib pain after a fall early this morning.  He states he got up to use the bathroom and he lost his balance.  He landed on his right side and immediately had pain.  He got himself into a chair and called EMS.  No head injury or LOC.  In the ED, he was found to have multiple right-sided rib fractures, CT chest demonstrates multiple nondisplaced and minimally displaced rib fractures are identified including fractures of the 3rd, 4th, 5th, 6th, 7th, and 8th ribs laterally and the 5th rib posteriorly. No pneumothorax and oxygen saturations are 97% on his regular home oxygen of 3L via NC.  He is being admitted for pain control.    Overview/Hospital Course:  Mr Emmanuel Grant is a 74 y.o. man with pulmonary fibrosis on 3L home O2, frequent falls at home, on home hospice who fell, called EMS, and was admitted with R rib fractures 3-8. Started pain control. However, he worsened, becoming febrile, confused, and more hypoxic. He was moved to ICU on 4/29, started antibiotics. Pulmonary, Palliative consulted. His respiratory status has improved. Anesthesia performed BETTY block on 4/30. He is more alert on 5/2/25 but still delirious. Schedule liquid tylenol, tizanidine to try to wean off opioids.     Interval History: He was delirious overnight. He is more alert today- oriented to self, location, knows he broke his ribs, can name his family. He did need IV morphine for pain control this AM.    Review  of Systems   Respiratory:  Negative for shortness of breath.    Cardiovascular:  Positive for chest pain.   Gastrointestinal:  Negative for abdominal pain.   Psychiatric/Behavioral:  Positive for confusion.      Objective:     Vital Signs (Most Recent):  Temp: 97.6 °F (36.4 °C) (05/02/25 0700)  Pulse: 86 (05/02/25 0945)  Resp: (!) 24 (05/02/25 1233)  BP: 131/88 (05/02/25 0945)  SpO2: 97 % (05/02/25 0945) Vital Signs (24h Range):  Temp:  [96.7 °F (35.9 °C)-98.1 °F (36.7 °C)] 97.6 °F (36.4 °C)  Pulse:  [] 86  Resp:  [11-44] 24  SpO2:  [75 %-100 %] 97 %  BP: ()/() 131/88     Weight: 59.9 kg (132 lb 0.9 oz)  Body mass index is 20.68 kg/m².    Intake/Output Summary (Last 24 hours) at 5/2/2025 1234  Last data filed at 5/2/2025 1000  Gross per 24 hour   Intake 616.4 ml   Output 730 ml   Net -113.6 ml         Physical Exam  Vitals and nursing note reviewed.   Constitutional:       Appearance: He is ill-appearing.   Cardiovascular:      Rate and Rhythm: Normal rate and regular rhythm.   Pulmonary:      Effort: Pulmonary effort is normal.      Breath sounds: No rhonchi or rales.      Comments: 3L NC  Abdominal:      General: Bowel sounds are normal.      Palpations: Abdomen is soft.      Tenderness: There is no abdominal tenderness.   Genitourinary:     Comments: camejo  Musculoskeletal:      Right lower leg: No edema.      Left lower leg: No edema.   Skin:     General: Skin is warm and dry.   Neurological:      Mental Status: He is disoriented.               Significant Labs: All pertinent labs within the past 24 hours have been reviewed.    Significant Imaging: I have reviewed all pertinent imaging results/findings within the past 24 hours.      Assessment & Plan  Rib fractures  - due to mechanical fall at home  - pain control with lidocaine patch, scheduled tylenol liquid, scheduled tizanidine, prn iv morphine  - completed course of ketorolac  - incentive spirometry if he can wake up to do it  - Anesthesia  completed BETTY block on 4/30  - avoiding ativan, precedex PRN for agitation, seroquel QHS for delirium   - respiratory status improved, at baseline 3L  - continue antibiotics for suspected aspiration x5 days  Chronic diastolic congestive heart failure  Patient has Diastolic (HFpEF) heart failure that is Chronic. On presentation their CHF was well compensated.     Echo 4/3/25    Left Ventricle: The left ventricle is normal in size. Normal wall thickness. Mild global hypokinesis present. There is mildly reduced systolic function with a visually estimated ejection fraction of 45 - 50%.    Right Ventricle: The right ventricle is normal in size. Systolic function is normal.    Aorta: Aortic root is mildly dilated measuring 3.82 cm.    Similar findings noted on prior report dated 3/17/25.    Current Heart Failure Medications  hydrALAZINE injection 10 mg, Every 6 hours PRN, Intravenous    Plan  - Place on telemetry  - he is currently too sedated to take home Rx  - he appears hypovolemic- hold lasix          Type 2 diabetes mellitus without complication, without long-term current use of insulin  Lab Results   Component Value Date    HGBA1C 7.9 (H) 05/01/2025     - lantus 15QD  - glucose checks with low dose SSI  - hold home oral meds    Essential hypertension  Patient's blood pressure range in the last 24 hours was: BP  Min: 96/68  Max: 178/135.The patient's inpatient anti-hypertensive regimen is listed below:  Current Antihypertensives  hydrALAZINE injection 10 mg, Every 6 hours PRN, Intravenous    Plan  - BP is controlled, no changes needed to their regimen  Coronary artery disease involving 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 Continue to monitor on telemetry.   - note last angiogram 9/2024 with patient LAD stent   - currently unable to swallow asa  BPH (benign prostatic  hyperplasia)  - unable to swallow flomax currently  - DC camejo for voiding trial    H/O: CVA (cerebrovascular accident)  - noted     Immunosuppression due to drug therapy  - for IPF- see IPF    Anemia, chronic disease  Anemia is likely due to chronic blood loss. Most recent hemoglobin and hematocrit are listed below.  Recent Labs     04/30/25  0428 05/01/25  0511 05/02/25  0348   HGB 12.6* 12.0* 12.0*   HCT 38.7* 36.8* 37.9*     Plan  - Monitor serial CBC: Daily  - Transfuse PRBC if patient becomes hemodynamically unstable, symptomatic or H/H drops below 7/21.  - Patient has not received any PRBC transfusions to date  - Patient's anemia is currently stable    IPF (idiopathic pulmonary fibrosis)  - appeared at baseline upon admission  - on 3L home O2  - continues on home nintedanib if he can swallow it (not currently)  - holding mycophenolate with concerns for infection   Acute on chronic respiratory failure with hypoxia  Patient admitted with Hypoxic which is Acute on Chronic.  he is on home oxygen at 3 LPM. Signs/symptoms of respiratory failure include- tachypnea and increased work of breathing present on admission.   - Labs and images were reviewed. Patient Has recent ABG, which has been reviewed..   - Supplemental oxygen was provided and noted- 3L NC  - Respiratory failure is due to- Aspiration, Interstitial lung disease, and rib fractures   - rib fractures: pain control, incentive spirometry if he can wake up to do it, BETTY block with anesthesia completed 4/30   - changed tylenol to liquid   - added scheduled tizanidine with goals to get off morphine  - ILD: continue home Rx if he can tolerate but hold mycophenolate with concerns for infection. Continue inhalers  - fever noted, concern for aspiration: started cefepime- continue x5 day course  - has HFpEF but currently appears eu/hypovolemic- hold lasix  - Pulmonary following  - respiratory status improving     Advance care planning  Advance Care Planning      Date: 04/29/2025  - Palliative consulted and discussed with family  - note he is on home hospice  - currently continue care but with limitation- no chest compressions, ok for intubation if needed  - family is flying in town  - time spent discussing with palliative and reviewing documentation= 5 minutes     Advance Care Planning     Date: 04/30/2025  - palliative met with family again today  - DNR/DNI code status  - time spent reviewing= 5 minutes               Acute metabolic encephalopathy  - worsening on 4/28-29  - suspect due to sedative medications given for pain control  - he needs pain/anxiety control but will try to limit ativan use  - schedule seroquel QHS    Delirium  - see AMS    VTE Risk Mitigation (From admission, onward)           Ordered     enoxaparin injection 30 mg  Every 12 hours         05/01/25 1027     Reason for No Pharmacological VTE Prophylaxis  Once        Question:  Reasons:  Answer:  Risk of Bleeding    04/27/25 1755     IP VTE HIGH RISK PATIENT  Once         04/27/25 1755     Place sequential compression device  Until discontinued         04/27/25 1755                    Discharge Planning   DERIC:      Code Status: DNR   Medical Readiness for Discharge Date:   Discharge Plan A: New Nursing Home placement - alf care facility        Updated family at bedside.     Critical care time spent on the evaluation and treatment of severe organ dysfunction, review of pertinent labs and imaging studies, discussions with consulting providers and discussions with patient/family: 30 minutes.    2:56 PM Called transfer center to discuss transfer for anesthesia pain management. Awaiting call back.     4:05 PM  Advance Care Planning     Date: 05/02/2025     He has not been able to tolerate any oral medications for pain control. Discussed with family that rib block is wearing off, ketorolac course completed and no longer an option, and now he is receiving only opioids for pain control because he  cannot swallow any other options. Discussed NGT as an option, but he is already pulling at other devices and likely would remove that. Discussed waiting for discussion with anesthesia for epidural consideration. Discussed that if that is not an option, that we need to strongly consider inpatient hospice care with continuous opioid infusion for pain control because he is suffering, crying out in pain, tachycardic, hypertensive, tachypneic, and hypoxic. Son and ex-wife at bedside understand. Given fentanyl with some response. Time spent discussing= 20 minutes     Nargis Parr MD  Department of Hospital Medicine   [facility] - Intensive Care

## 2025-05-02 NOTE — SUBJECTIVE & OBJECTIVE
Interval History: He was delirious overnight. He is more alert today- oriented to self, location, knows he broke his ribs, can name his family. He did need IV morphine for pain control this AM.    Review of Systems   Respiratory:  Negative for shortness of breath.    Cardiovascular:  Positive for chest pain.   Gastrointestinal:  Negative for abdominal pain.   Psychiatric/Behavioral:  Positive for confusion.      Objective:     Vital Signs (Most Recent):  Temp: 97.6 °F (36.4 °C) (05/02/25 0700)  Pulse: 86 (05/02/25 0945)  Resp: (!) 24 (05/02/25 1233)  BP: 131/88 (05/02/25 0945)  SpO2: 97 % (05/02/25 0945) Vital Signs (24h Range):  Temp:  [96.7 °F (35.9 °C)-98.1 °F (36.7 °C)] 97.6 °F (36.4 °C)  Pulse:  [] 86  Resp:  [11-44] 24  SpO2:  [75 %-100 %] 97 %  BP: ()/() 131/88     Weight: 59.9 kg (132 lb 0.9 oz)  Body mass index is 20.68 kg/m².    Intake/Output Summary (Last 24 hours) at 5/2/2025 1234  Last data filed at 5/2/2025 1000  Gross per 24 hour   Intake 616.4 ml   Output 730 ml   Net -113.6 ml         Physical Exam  Vitals and nursing note reviewed.   Constitutional:       Appearance: He is ill-appearing.   Cardiovascular:      Rate and Rhythm: Normal rate and regular rhythm.   Pulmonary:      Effort: Pulmonary effort is normal.      Breath sounds: No rhonchi or rales.      Comments: 3L NC  Abdominal:      General: Bowel sounds are normal.      Palpations: Abdomen is soft.      Tenderness: There is no abdominal tenderness.   Genitourinary:     Comments: camejo  Musculoskeletal:      Right lower leg: No edema.      Left lower leg: No edema.   Skin:     General: Skin is warm and dry.   Neurological:      Mental Status: He is disoriented.               Significant Labs: All pertinent labs within the past 24 hours have been reviewed.    Significant Imaging: I have reviewed all pertinent imaging results/findings within the past 24 hours.

## 2025-05-02 NOTE — PROGRESS NOTES
Pharmacist Renal Dose Adjustment Note    Emmanuel Grant is a 74 y.o. male being treated with the medication Cefepime    Patient Data:    Vital Signs (Most Recent):  Temp: 98.1 °F (36.7 °C) (05/02/25 0400)  Pulse: 87 (05/02/25 0630)  Resp: (!) 27 (05/02/25 0630)  BP: (!) 140/89 (05/02/25 0630)  SpO2: 97 % (05/02/25 0630) Vital Signs (72h Range):  Temp:  [96.5 °F (35.8 °C)-100.9 °F (38.3 °C)]   Pulse:  []   Resp:  [10-60]   BP: ()/()   SpO2:  [75 %-100 %]      Recent Labs   Lab 05/01/25  0511 05/01/25  1243 05/02/25  0348   CREATININE 1.1 1.1 0.9     Serum creatinine: 0.9 mg/dL 05/02/25 0348  Estimated creatinine clearance: 62.9 mL/min    Medication:Cefepime 2000 mg every 12 hours will be changed to Cefepime 2000 mg every 8 hours    Pharmacist's Name: Keyshawn Magana Jr  Pharmacist's Extension: [phone]

## 2025-05-02 NOTE — ASSESSMENT & PLAN NOTE
- improving; discussed with bedside RN and later with MDT trial of taking off restraints with pt now more cooperative   - at baseline in prior hospitalizations pt did not even like to stay in his hospital room during the day, would sit in bedside chair in benitez and request to walk with walker in the benitez; restraints could complicate frustration and mood   - cont'd precedex for delirium; also discussed Zyrexa as alternative to Seroquel to aid in delirium day/night confusion until PO is possible, which is now ordered nightly per    - HS delirium is complicating assessment of necessary opioid regimen as increased physical movement is leading to need for increased opioid usage, unfortunately resulting in sedation throughout the day and continuing day/night confusion   - attempt to wean from precedex 5/2 not successful; see rib fx

## 2025-05-02 NOTE — EICU
LEXI Night Rounds Checklist  24H Vital Sign Range:  Temp:  [96.5 °F (35.8 °C)-97.7 °F (36.5 °C)]   Pulse:  []   Resp:  [10-50]   BP: ()/(57-99)   SpO2:  [77 %-100 %]     Video rounds

## 2025-05-02 NOTE — CARE UPDATE
"Checked on pt after discussing case with day team.  Appears to be in 10/10 pain with HR 140s, BP 160s/110s, and RR 40s.  Based on the following reasons, I am increasing the pt's fentanyl push frequency from q2h PRN to q30m PRN:    His current VS and breathing pattern are unsustainable in any patient, be they hospice or not.  He is technically still a hospice pt, the primary focus of which is on patient comfort.  The only hospice matter in flux is home vs. Inpatient.  Primary's ACP note today in which son and wife were documented to have understood our reasoning for wanting to be more aggressive with pain control, with no notation of any disagreement or hesitation.    Will absolutely try our best to avoid over-sedation as family tries to get here.  But in line with his expressed wishes of "managing distressful symptoms" (eg pain) as documented in 4/7's Palliative Care note, and in the absence of any subsequent changes to his GOC to the contrary, will titrate analgesia up until we can get him comfortable.  Will continue to try to reach family by phone.    Pramod Valle MD MPH  [facility]  "

## 2025-05-02 NOTE — ASSESSMENT & PLAN NOTE
"5/2/2025   - interval chart reviewed; discussed pt with MDT during ICU rounds   - met with pt, son, and ex-wife at bedside multiple times throughout the day; pt continues to lack capacity for medical decision making   - discussions with ex-wife and son, Félix, throughout the day providing updates in team goals and plan of care   - transparent discussions regarding concerns for inability to balance pt's delirium, pain management, safety, and honoring family's goal to limit sedation and opioids; shared concerns that pt will continue on poor day/night cycle and periods of activity that require sedation, especially given inability to wean precedex   - also transparently shared my concern for pt to suffer complications of pneumonia this admission due to aspiration 2/2 delirium or due to movement restrictions 2/2 pain and bed bound status; transparently discussed that given pt's baseline lung function, pna would likely be terminal; family expressed good insight into all of this   - HM offered consideration of discussion with [facility]/transfer center for transfer to [facility] for thoracic epidural; discussed limiting factors for pt including delirium potentially complication qualification for procedure and risks of relocation for worsening delirium; family did wish to explore if pt would be a candidate for this; HM to discuss with [facility]; family would need to further discuss with pt's preferred MPOAEmmanuel Jr. "RJ"   - discussed alternative plan for consideration of transition to inpatient hospice at the hospital with pt's prior home hospice team, [facility], to allow shift of focus to pt comfort with hope pt could tolerate transfer to prison placement with potential for that environment being more conducive to overcoming delirium; [facility] team and inpatient hospice unit available at [facility] as well if transfer does occur and [facility] aligns for inpatient hospice at any point following    - discussed pt's status and potential need for " inpatient hospice transition with [facility] team to aid in smooth transition if indicated/agreed on by family  - attempted to call pt's son CAIT to share above discussions and concerns, LM and return contact info as he is likely flying back home at this time, Félix to also discuss with RJ   - multiple discussions with family and MDT throughout the day, multiple pt symptom assessments   - emotional support provided to family and pt; answered all questions      5/1/2025   - interval chart reviewed; discussed pt with MDT during ICU rounds   - met with pt and 2/3 sons at bedside; pt with gradually improving mental status but remains unable to participate in GOC/ACP discussions on his own behalf   - ongoing GOC/ACP discussions with sons and ongoing updates/discussions on MDT plans for symptom management and plan of care   - sons working with CM and family to formulate a plan for care following hospitalization   - ultimate plan is for relocation to [place] following hospitalization; family with good insight that this will be a complex task  - recommended consideration of short term local halfway plan as well to allow for pt to recover more from rib fractures while getting support and care through hospice to aid in planning and resources of relocation   - emotional support provided; answered all questions   - Emmanuel Rojas is returning home 5/2 AM, other son with plans to remain local until Wed at this time   - ongoing communication and coordination with MDT     4/30/2025   - interval chart reviewed; discussed pt with MDT during ICU rounds   - met with pt, and son along with Dr. Urena, [facility]; pt continues to lack capacity to participate in discussions/decisions   - Son advocates for pt to be DNR/DNI now that he is present and has good insight into this decision and feels it aligns with pt's wishes   - pt's other sons and their mother are also traveling to be here to see pt and assist in GOC/ACP discussions/plans   - overall  insight and plan that pt will not be able to return to living independently; exploring options of care home placement with hospice in the local area or in [place] with pt's other 2 sons   - Dr. Urena provided update and overview of pt's resp hx/status and current inpatient plan of care; son with good insight as he is a trauma surgeon   - son shared that pt has always been very private and has refused to relocate with prior attempts to aid in care  - son has requested to meet with  to learn more about care home and relocation processes and pt's benefits; update CM of this request   - emotional support provided; answered all questions   - updated MDT     4/29/2025 - Consult   - consult received; interval chart reviewed in detail; discussed pt with MDT during ICU rounds   - pt known to myself and palliative medicine team from previous admission; pt on hospice with Heather prior to this admission   - discussed current hospice plan of care with hospice team; hospice team has had ongoing GOC discussions with pt and family regarding code status as well as need for care home care vs living with family, which has not been successful; pt has been fearful of resp symptom management with opioids  - hospice team will likely require some sort of care home care to resume services outside of hospital if pt condition approves or can provide inpatient hospice care if needed   - pt currently with severe encephalopathy and doesn't not have capacity for medical decision making   - spoke with son, Dr. Emmanuel Grant Jr; brief medical updated provided, encouraged his plan to fly to area due to pt's condition ([place])   - reviewed code status; son shares that no formal decision had been made with pt regarding code status; they discussed clear wishes for no chest compressions so son was agreeable to partial code with the chest compressions; son shared pt would likely not wish to be intubated as they have insight that pt  being able to be extubated/improve would be unlikely, would not wish for long term life support; however, given circumstances and pt's respiratory status son wishes for intubation if indicated to allow for family to travel to pt   - son plans to take first flight to area today with expected arrival approx 5pm unless flight schedules change   - reviewed current discussed plan of care from MDT and encouraged that the plan would attempt to limit opioids and benzos as possible, son with good insight that pt's comfort and safety would take precedent in interventions   - emotional support provided; answered all questions  - multiple assessments of pt throughout the day to assess symptom management   - ongoing discussions and communication with MDT

## 2025-05-02 NOTE — SUBJECTIVE & OBJECTIVE
Interval History: agitated over night requiring morphine and ativan.  Alert and confused this am.        Objective:     Vital Signs (Most Recent):  Temp: 98.6 °F (37 °C) (05/02/25 1100)  Pulse: 102 (05/02/25 1245)  Resp: (!) 32 (05/02/25 1245)  BP: (!) 167/102 (05/02/25 1245)  SpO2: (!) 94 % (05/02/25 1245) Vital Signs (24h Range):  Temp:  [96.7 °F (35.9 °C)-98.6 °F (37 °C)] 98.6 °F (37 °C)  Pulse:  [] 102  Resp:  [11-41] 32  SpO2:  [67 %-100 %] 94 %  BP: ()/() 167/102     Weight: 59.9 kg (132 lb 0.9 oz)  Body mass index is 20.68 kg/m².      Intake/Output Summary (Last 24 hours) at 5/2/2025 1430  Last data filed at 5/2/2025 1000  Gross per 24 hour   Intake 616.4 ml   Output 730 ml   Net -113.6 ml        Physical Exam  Vitals and nursing note reviewed.   Constitutional:       Appearance: He is ill-appearing.   Cardiovascular:      Rate and Rhythm: Regular rhythm. Tachycardia present.   Pulmonary:      Comments: Stable on nasal canula.   Abdominal:      General: Bowel sounds are normal.      Palpations: Abdomen is soft.      Tenderness: There is no abdominal tenderness.   Genitourinary:     Comments: camejo  Musculoskeletal:      Right lower leg: No edema.      Left lower leg: No edema.   Skin:     General: Skin is warm and dry.   Neurological:      Mental Status: Mental status is at baseline.      Comments: Arousable but confuse.             Review of Systems    Vents:  Oxygen Concentration (%): 28 (05/02/25 0814)    Lines/Drains/Airways       Drain  Duration                  Urethral Catheter 04/28/25 2230 Coude 14 Fr. 3 days              Peripheral Intravenous Line  Duration                  Peripheral IV - Single Lumen 04/29/25 1300 22 G Anterior;Left Forearm 3 days         Peripheral IV - Single Lumen 04/30/25 1400 22 G Anterior;Distal;Right Upper Arm 2 days                    Significant Labs:    CBC/Anemia Profile:  Recent Labs   Lab 05/01/25  0511 05/02/25  0348   WBC 7.88 6.64   HGB 12.0*  "12.0*   HCT 36.8* 37.9*    164   MCV 83 86   RDW 14.5 14.4        Chemistries:  Recent Labs   Lab 05/01/25  0511 05/01/25  1243 05/02/25  0348    145 144   K 4.4 3.7 3.5    105 108   CO2 19* 25 23   BUN 43* 44* 41*   CREATININE 1.1 1.1 0.9   CALCIUM 9.4 9.5 9.7     BNP  Recent Labs   Lab 04/29/25  1206   *         ABGs:   No results for input(s): "PH", "PCO2", "HCO3", "POCSATURATED", "BE" in the last 48 hours.    Blood Culture: No results for input(s): "LABBLOO" in the last 48 hours.  Respiratory Culture: No results for input(s): "GSRESP", "RESPIRATORYC" in the last 48 hours.  Echo 4/3/25       Left Ventricle: The left ventricle is normal in size. Normal wall thickness. Mild global hypokinesis present. There is mildly reduced systolic function with a visually estimated ejection fraction of 45 - 50%.    Right Ventricle: The right ventricle is normal in size. Systolic function is normal.    Aorta: Aortic root is mildly dilated measuring 3.82 cm.    Similar findings noted on prior report dated 3/17/25.    Significant Imaging:   I have reviewed all pertinent imaging results/findings within the past 24 hours.  CT: I have reviewed all pertinent results/findings within the past 24 hours and my personal findings are:  4/28/25 no overt ggo.  +patchy septal reticulation and honeycombing predominantly at bases.  This is similar to findings on 3/30/25 CT but has worsen when compared to CTA 7/7/23  CXR: I have reviewed all pertinent results/findings within the past 24 hours and my personal findings are:  4/29/25 increase reticular markings.  No overt consolidation  "

## 2025-05-02 NOTE — NURSING
Patient continues with tachycardia - hypertension - and tachypnea - hollers out as in pain - MD Valle at the bedside - evaluating the patient - adjusting medications for pain control - however not effective at this time - see vital signs sheet - mar for medications given and effectiveness - left arm iv site starting to become puffy - so changed to the right antecub  - site clean dry and intact- Precedex infusing via that line - MD trying to get in touch with family at this time -

## 2025-05-02 NOTE — ASSESSMENT & PLAN NOTE
Hyperglycemia with  with anion gap of 19.  Beta hydroxybutyrate elevated  Lantus increase by Dr. Parr  Gap closed this am.

## 2025-05-03 NOTE — SUBJECTIVE & OBJECTIVE
Interval History: patient with severe agitation overnight requiring zyprexa,  fentanyl, ativan. Unable to give oral meds.  Spit water out with attempt at oral feeding.        Objective:     Vital Signs (Most Recent):  Temp: 97.4 °F (36.3 °C) (05/03/25 0701)  Pulse: 63 (05/03/25 0701)  Resp: 12 (05/03/25 0656)  BP: 136/80 (05/03/25 0615)  SpO2: 100 % (05/03/25 0656) Vital Signs (24h Range):  Temp:  [97.4 °F (36.3 °C)-100.6 °F (38.1 °C)] 97.4 °F (36.3 °C)  Pulse:  [] 63  Resp:  [11-61] 12  SpO2:  [67 %-100 %] 100 %  BP: ()/() 136/80     Weight: 59.9 kg (132 lb 0.9 oz)  Body mass index is 20.68 kg/m².      Intake/Output Summary (Last 24 hours) at 5/3/2025 1100  Last data filed at 5/3/2025 0655  Gross per 24 hour   Intake 836.13 ml   Output 830 ml   Net 6.13 ml        Physical Exam  Vitals and nursing note reviewed.   Constitutional:       Appearance: He is ill-appearing.   Cardiovascular:      Rate and Rhythm: Regular rhythm. Tachycardia present.   Pulmonary:      Comments: Stable on nasal canula.   Abdominal:      General: Bowel sounds are normal.      Palpations: Abdomen is soft.      Tenderness: There is no abdominal tenderness.   Genitourinary:     Comments: camejo  Musculoskeletal:      Right lower leg: No edema.      Left lower leg: No edema.   Skin:     General: Skin is warm and dry.   Neurological:      Mental Status: Mental status is at baseline.      Comments: Arousable but confuse.             Review of Systems    Vents:  Oxygen Concentration (%): 28 (05/02/25 0814)    Lines/Drains/Airways       Drain  Duration                  Urethral Catheter 04/28/25 2230 Coude 14 Fr. 4 days              Peripheral Intravenous Line  Duration                  Peripheral IV - Single Lumen 04/30/25 1400 22 G Anterior;Distal;Right Upper Arm 2 days         Peripheral IV - Single Lumen 05/02/25 2159 20 G Anterior;Distal;Left Upper Arm <1 day                    Significant Labs:    CBC/Anemia Profile:  Recent  "Labs   Lab 05/02/25  0348 05/03/25  0351   WBC 6.64 5.66   HGB 12.0* 11.8*   HCT 37.9* 37.2*    191   MCV 86 85   RDW 14.4 14.6*        Chemistries:  Recent Labs   Lab 05/01/25  1243 05/02/25  0348 05/03/25  0351    144 148*   K 3.7 3.5 4.2    108 112*   CO2 25 23 21*   BUN 44* 41* 39*   CREATININE 1.1 0.9 1.0   CALCIUM 9.5 9.7 9.3     BNP  Recent Labs   Lab 04/29/25  1206   *         ABGs:   No results for input(s): "PH", "PCO2", "HCO3", "POCSATURATED", "BE" in the last 48 hours.    Blood Culture: No results for input(s): "LABBLOO" in the last 48 hours.  Respiratory Culture: No results for input(s): "GSRESP", "RESPIRATORYC" in the last 48 hours.  Echo 4/3/25       Left Ventricle: The left ventricle is normal in size. Normal wall thickness. Mild global hypokinesis present. There is mildly reduced systolic function with a visually estimated ejection fraction of 45 - 50%.    Right Ventricle: The right ventricle is normal in size. Systolic function is normal.    Aorta: Aortic root is mildly dilated measuring 3.82 cm.    Similar findings noted on prior report dated 3/17/25.    Significant Imaging:   I have reviewed all pertinent imaging results/findings within the past 24 hours.  CT: I have reviewed all pertinent results/findings within the past 24 hours and my personal findings are:  4/28/25 no overt ggo.  +patchy septal reticulation and honeycombing predominantly at bases.  This is similar to findings on 3/30/25 CT but has worsen when compared to CTA 7/7/23  CXR: I have reviewed all pertinent results/findings within the past 24 hours and my personal findings are:  4/29/25 increase reticular markings.  No overt consolidation  "

## 2025-05-03 NOTE — PLAN OF CARE
Recommendations  1. Continuous feeds via NGT using Diabetisource AC.   Initiate at 10 mL/hr and advance as tolerated by 10-15 mL Q4-6H to a goal rate of 55 mL/hr.   Goal rate provides 1584 kcals (97% EEN), 79 g protein (88% EPN) and 1080 mL FW. FWF per MD.   Add 1 Beneprotein packet daily via NGT to be mixed and administered with FWF per  instructions.   Provides an additional 6 g protein and 25 kcals (94% EPN, 98% EEN with TF).     2. Monitor for signs of TF intolerance (GRV > 500 mL, abdominal distention, N/V/D).   3. Monitor weights and nutrition related labs.   4. On site RD to follow.    Goals: Patient to achieve TF goal rate of 55 mL/hr as tolerated prior to RD follow up.  Nutrition Goal Status: new

## 2025-05-03 NOTE — NURSING
[facility] [place]  Nurses Note -- 4 Eyes      5/2/2025       Skin assessed on: Q Shift      [x] No Pressure Injuries Present    [x]Prevention Measures Documented    [] Yes LDA  for Pressure Injury Previously documented     [] Yes New Pressure Injury Discovered   [] LDA for New Pressure Injury Added      Attending RN:  Serge Brizuela RN     Second RN:  KANDI Mares

## 2025-05-03 NOTE — SUBJECTIVE & OBJECTIVE
Interval History: This morning he is sedated. He is on precedex. He does not wake up to speak to me.     Review of Systems   Unable to perform ROS: Mental status change     Objective:     Vital Signs (Most Recent):  Temp: 97.4 °F (36.3 °C) (05/03/25 0701)  Pulse: 63 (05/03/25 0656)  Resp: 12 (05/03/25 0656)  BP: 136/80 (05/03/25 0615)  SpO2: 100 % (05/03/25 0656) Vital Signs (24h Range):  Temp:  [97.4 °F (36.3 °C)-100.6 °F (38.1 °C)] 97.4 °F (36.3 °C)  Pulse:  [] 63  Resp:  [11-61] 12  SpO2:  [67 %-100 %] 100 %  BP: ()/() 136/80     Weight: 59.9 kg (132 lb 0.9 oz)  Body mass index is 20.68 kg/m².    Intake/Output Summary (Last 24 hours) at 5/3/2025 1032  Last data filed at 5/3/2025 0655  Gross per 24 hour   Intake 836.13 ml   Output 830 ml   Net 6.13 ml         Physical Exam  Vitals and nursing note reviewed.   Constitutional:       Appearance: He is ill-appearing.   Cardiovascular:      Rate and Rhythm: Normal rate and regular rhythm.   Pulmonary:      Effort: Pulmonary effort is normal.      Breath sounds: Rhonchi and rales present.      Comments: 4L NC  Abdominal:      General: Bowel sounds are normal.      Palpations: Abdomen is soft.      Tenderness: There is no abdominal tenderness.   Genitourinary:     Comments: camejo  Musculoskeletal:      Right lower leg: No edema.      Left lower leg: No edema.   Skin:     General: Skin is warm and dry.   Neurological:      Mental Status: He is disoriented.               Significant Labs: All pertinent labs within the past 24 hours have been reviewed.    Significant Imaging: I have reviewed all pertinent imaging results/findings within the past 24 hours.

## 2025-05-03 NOTE — ASSESSMENT & PLAN NOTE
Patient admitted with Hypoxic which is Acute on Chronic.  he is on home oxygen at 3 LPM. Signs/symptoms of respiratory failure include- tachypnea and increased work of breathing present on admission.   - Labs and images were reviewed. Patient Has recent ABG, which has been reviewed..   - Supplemental oxygen was provided and noted- 3L NC  - Respiratory failure is due to- Aspiration, Interstitial lung disease, and rib fractures   - rib fractures- see rib fractures  - ILD: continue home Rx if he can tolerate but hold mycophenolate with concerns for infection. Continue inhalers  - fever noted, concern for aspiration: started cefepime- continue x5 day course  - has HFpEF but currently appears eu/hypovolemic- hold lasix  - Pulmonary following

## 2025-05-03 NOTE — ASSESSMENT & PLAN NOTE
Patient has Diastolic (HFpEF) heart failure that is Chronic. On presentation their CHF was well compensated.     Echo 4/3/25    Left Ventricle: The left ventricle is normal in size. Normal wall thickness. Mild global hypokinesis present. There is mildly reduced systolic function with a visually estimated ejection fraction of 45 - 50%.    Right Ventricle: The right ventricle is normal in size. Systolic function is normal.    Aorta: Aortic root is mildly dilated measuring 3.82 cm.    Similar findings noted on prior report dated 3/17/25.    Current Heart Failure Medications  hydrALAZINE injection 10 mg, Every 6 hours PRN, Intravenous    Plan  - Place on telemetry  - he is currently too sedated to take home Rx  - hold lasix

## 2025-05-03 NOTE — NURSING
[facility] - [place]  Nurses Note -- 4 Eyes      5/3/2025       Skin assessed on: Q Shift      [x] No Pressure Injuries Present    [x]Prevention Measures Documented    [] Yes LDA  for Pressure Injury Previously documented     [] Yes New Pressure Injury Discovered   [] LDA for New Pressure Injury Added      Attending RN:  Serge Brizuela RN     Second RN:  Ama RN

## 2025-05-03 NOTE — ASSESSMENT & PLAN NOTE
- worsening on 4/28-29  - suspect due to sedative medications given for pain control  - he needs pain/anxiety control but will try to limit ativan use  - schedule seroquel BID once NGT verified

## 2025-05-03 NOTE — PLAN OF CARE
Problem: Diabetes Comorbidity  Goal: Blood Glucose Level Within Targeted Range  Outcome: Progressing     Problem: Coping Ineffective  Goal: Effective Coping  Outcome: Progressing     Problem: Restraint, Nonviolent  Goal: Absence of Harm or Injury  Outcome: Met     Problem: Pain Acute  Goal: Optimal Pain Control and Function  Outcome: Not Progressing

## 2025-05-03 NOTE — CONSULTS
[facility] Intensive Care  Adult Nutrition  Consult Note    SUMMARY     Recommendations  1. Continuous feeds via NGT using Diabetisource AC.   Initiate at 10 mL/hr and advance as tolerated by 10-15 mL Q4-6H to a goal rate of 55 mL/hr.   Goal rate provides 1584 kcals (97% EEN), 79 g protein (88% EPN) and 1080 mL FW. FWF per MD.   Add 1 Beneprotein packet daily via NGT to be mixed and administered with FWF per  instructions.   Provides an additional 6 g protein and 25 kcals (94% EPN, 98% EEN with TF).     2. Monitor for signs of TF intolerance (GRV > 500 mL, abdominal distention, N/V/D).   3. Monitor weights and nutrition related labs.   4. On site RD to follow.    Goals: Patient to achieve TF goal rate of 55 mL/hr as tolerated prior to RD follow up.  Nutrition Goal Status: new  Communication of RD Recs: other (comment) (POC)    Nutrition Discharge Planning     Nutrition Discharge Planning: Too early to determine, pending clinical course    Malnutrition Assessment   No NFPE - RD remote          Energy Intake (Malnutrition): less than or equal to 50% for greater than or equal to 5 days                       Reason for Assessment    Reason For Assessment: consult, identified at risk by screening criteria, low BMI (MST 3 (unsure, decreased appetite))  Diagnosis: other (see comments) (rib fx)  Past Medical History:   Diagnosis Date    Acute hypoxic respiratory failure 09/23/2024    Oxygen therapy 24/7 now, continue 2 L oxygen therapy.      BPH (benign prostatic hyperplasia) 02/28/2024    CAD (coronary artery disease)     Sees [facility], h/o stent    Chronic HFrEF (heart failure with reduced ejection fraction) 05/01/2024    Diabetes mellitus     Hypertension     Kidney stone     Stroke     age 62    Type 2 diabetes mellitus without complication, without long-term current use of insulin 10/08/2021     General Information Comments: RD providing remote weekend coverage. Consult received for TF recs. Patient is a 74  "yo M previously on home hospice who presented with fall and was found to have rib fractures. Was admitted for pain control and transferred to ICU for decreased SpO2. Per MD note, patient without significant nutrition in 1 week. Pain requiring increased need for sedation/pain control. Acute metabolic encephalopathy, becoming agitatied and more altered in mental status. Medical team working with family to discuss interventions that align with patient's GOC. Plans to place NGT for feeds and medications. RD to provide continuous EN with Diabetisource AC. Weight gain per chart reivew within the last month. No edema noted per flowsheet, h/o CHF. LBM 4/27. On site RD to monitor for any nutrition related changes.    Nutrition/Diet History    Spiritual, Cultural Beliefs, Mandaen Practices, Values that Affect Care: other (see comments) (none identified)  Food Allergies: NKFA  Factors Affecting Nutritional Intake: impaired cognitive status/motor control, pain, decreased appetite    Nutrition Related Social Determinants of Health: SDOH: Unable to assess at this time.     Anthropometrics    Height: 5' 7" (170.2 cm)  Height (inches): 67 in  Height Method: Measured  Weight: 59.9 kg (132 lb 0.9 oz)  Weight (lb): 132.06 lb  Weight Method: Bed Scale  Ideal Body Weight (IBW), Male: 148 lb  % Ideal Body Weight, Male (lb): 89.23 %  BMI (Calculated): 20.7  BMI Grade: less than 23 (older than 65 years) - underweight     Lab/Procedures/Meds    Pertinent Labs Reviewed: reviewed  Pertinent Labs Comments: glucose: 212 (H), POCT glucose: 243 (H)  BMP  Lab Results   Component Value Date     (H) 05/03/2025    K 4.2 05/03/2025     (H) 05/03/2025    CO2 21 (L) 05/03/2025    BUN 39 (H) 05/03/2025    CREATININE 1.0 05/03/2025    CALCIUM 9.3 05/03/2025    ANIONGAP 15 05/03/2025    EGFRNORACEVR >60 05/03/2025     Lab Results   Component Value Date    HGBA1C 7.9 (H) 05/01/2025     Pertinent Medications Reviewed: reviewed  Scheduled " Meds:   acetaminophen  999.0148 mg Per NG tube Q6H    arformoteroL  15 mcg Nebulization BID    aspirin  81 mg Oral Daily    budesonide  1 mg Nebulization BID    ceFEPime IV (PEDS and ADULTS)  2 g Intravenous Q8H    enoxparin  30 mg Subcutaneous Q12H (prophylaxis, 0900/2100)    ibuprofen  800 mg Per NG tube Q8H    insulin aspart U-100  5 Units Subcutaneous Q6H    insulin glargine U-100  15 Units Subcutaneous Daily    latanoprost  1 drop Both Eyes QHS    LIDOcaine  3 patch Transdermal Q24H    methocarbamoL  1,000 mg Per NG tube QID    mupirocin   Nasal BID    nintedanib  150 mg Oral BID    oxyCODONE  10 mg Per NG tube BID    QUEtiapine  25 mg Per NG tube BID     Continuous Infusions:   dexmedeTOMIDine (Precedex) infusion (titrating)  0-1.4 mcg/kg/hr Intravenous Continuous 17.97 mL/hr at 05/03/25 1101 1.2 mcg/kg/hr at 05/03/25 1101     Estimated/Assessed Needs    Weight Used For Calorie Calculations: 59.9 kg (132 lb 0.9 oz)  Energy Calorie Requirements (kcal): 1635  Energy Need Method: [place]-St Jeor (MSJ x 1.25 AF)  Protein Requirements: 90 g (1.5 g/kg)  Weight Used For Protein Calculations: 59.9 kg (132 lb 0.9 oz)  Fluid Requirements (mL): 1 mL/kcal  Estimated Fluid Requirement Method: RDA Method (or per MD)  RDA Method (mL): 1635  CHO Requirement: 204 g (50% EER)    Nutrition Prescription Ordered    Current Diet Order: CL    Evaluation of Received Nutrient/Fluid Intake    % Kcal Needs: 0-25%  % Protein Needs: 0-25%  IV Fluid (mL): 500.2 (LR)  I/O: - 2.4 L since admit  Energy Calories Required: meeting needs  Protein Required: meeting needs  Fluid Required: meeting needs  Tolerance: not tolerating (AMS)  % Intake of Estimated Energy Needs: 0 - 25 %  % Meal Intake: 0 - 25 %    PES Statement  Nutrition PES Status: New  Nutrition PES Problem: Inadequate protein energy intake  Nutrition PES Etiology: Acute illness  Nutrition PES Signs and Symptoms: Intake <25% estimated needs, NPO/CL status due to medical condition  (pain, rib fxs, acute metabolic encephalopathy)    Nutrition Risk    Level of Risk/Frequency of Follow-up: high (2 x per week)     Monitor and Evaluation    Monitor and Evaluation: Energy intake, Protein intake, Enteral and parenteral nutrition administration, Weight, Electrolyte and renal panel, Gastrointestinal profile, Glucose/endocrine profile     Nutrition Follow-Up    RD Follow-up?: Yes      Saima Drew RDN, LDN

## 2025-05-03 NOTE — ASSESSMENT & PLAN NOTE
Hypoxemic requiring high flow nasal canula  Suspect a combination of baseline IPF + atelectatic changes a/w rib fracture +/- volume overload  Will monitor for aspiration given mental status.  Back to nasal canula.  Oxygen requirement when not agitated.  Cxr 5/2/25 without evidence of aspiration

## 2025-05-03 NOTE — ASSESSMENT & PLAN NOTE
4/29/25 Given progression of IPF, patient was discharged to home hospice.  Per NP Alfredito, patient activated 911 after fall.  Currently full code.  On going goc discussion with son and palliative care.    4/30/25 spoken with son, Dr. Grant, and he agreed to DNR.  He is also agreeable to home hospice at the time of discharge.  Unclear if patient will still be living in [place] after discharge.   is working to explore various options available.    5/2/25 most  likely nursing home upon discharge.    5/3/25 Dr. Ta's note appreciated.  Continue with current POC

## 2025-05-03 NOTE — ASSESSMENT & PLAN NOTE
5/1 Hyperglycemia with  with anion gap of 19.  Beta hydroxybutyrate elevated  Lantus increase by Dr. Parr  iss

## 2025-05-03 NOTE — ASSESSMENT & PLAN NOTE
Anemia is likely due to chronic blood loss. Most recent hemoglobin and hematocrit are listed below.  Recent Labs     05/01/25  0511 05/02/25  0348 05/03/25  0351   HGB 12.0* 12.0* 11.8*   HCT 36.8* 37.9* 37.2*     Plan  - Monitor serial CBC: Daily  - Transfuse PRBC if patient becomes hemodynamically unstable, symptomatic or H/H drops below 7/21.  - Patient has not received any PRBC transfusions to date  - Patient's anemia is currently stable

## 2025-05-03 NOTE — ASSESSMENT & PLAN NOTE
- due to mechanical fall at home  - pain control with lidocaine patch, scheduled tylenol suppository, scheduled ketorolac (already completed 1 3 day course, now on the 2nd 3 day course), PRN morphine/fentanyl  - Anesthesia completed BETTY block on 4/30  - discussed with Anesthesia at [facility] [place] [facility] via transfer center on 5/2/25-- they would perform another block, but that has already been tried and was minimally effective   - his pain seems much worse when his delirium is worse at nighttime  - however, his pain and delirium are uncontrolled    Plan  - place NGT  - start scheduled tylenol 1g QID, ibuprofen 800mg TID, robaxin 1000 QID, gabapentin 300QHS-- all of this with goal to decrease opioids so that he is more awake and alert. Continue lidocaine patches  - start scheduled seroquel 25 BID  - start tube feeds and free water flushes  - ideally would also be awake and alert, be able to use incentive spirometer and participate with PT/OT  - completed course of ketorolac  - incentive spirometry if he can wake up to do it  - continue home O2 3L NC  - per Dr Rey note 5/2/25, family is not ready for comfort care at this time.   - will complete course of cefepime for aspiration- end date set

## 2025-05-03 NOTE — PROGRESS NOTES
[facility] - Intensive Care  Critical Care Medicine  Progress Note    Patient Name: Emmanuel Grant  MRN: [id number]  Admission Date: 4/27/2025  Hospital Length of Stay: 5 days  Code Status: DNR  Attending Provider: Nargis Parr MD  Primary Care Provider: Wilfredo De Souza MD   Principal Problem: Rib fractures    Subjective:     HPI:  Patient is 74 y.o. male  has a past medical history of Acute hypoxic respiratory failure (09/23/2024), BPH (benign prostatic hyperplasia) (02/28/2024), CAD (coronary artery disease), Chronic HFrEF (heart failure with reduced ejection fraction) (05/01/2024), Diabetes mellitus, Hypertension, Kidney stone, Stroke, and Type 2 diabetes mellitus without complication, without long-term current use of insulin (10/08/2021). presented to [facility] on 4/28/25 s/p fall with shoulder and rib pain.  Cxr with multiple nondisplaced right rib fractures and patient was admitted for pain control.  Patient with worsening confusion along with fever over night and was transferred to ICU.      Patient was followed by Dr. Schmitt as an outpatient for IPF.  [place] [facility] [place]  Patient was started on OFEV along with cellcept along with weaning regimen of prednisone.  Per Dr. Schmitt last note, patient was discharged with hospice.  Patient activated 911 after fall.      During my initial evaluation, patient was somnolent and groaning persistently.  HR 120s.  Bp stable.  Sating well on high flow nasal canula at 12 lpm.                 Additional Pulmonary History:  Childhood Illnesses:  none  Occupational:   works in air conditioning in installation and repair  Environmental:   no pets, no seasonal allergies, no carpet in his home and no concern for mold or allergy exposures there  Tobacco/Smoking:   never smoker    Hospital/ICU Course:  No notes on file    Interval History: patient with severe agitation overnight requiring zyprexa,  fentanyl, ativan. Unable to give oral meds.  Spit water out with  attempt at oral feeding.        Objective:     Vital Signs (Most Recent):  Temp: 97.4 °F (36.3 °C) (05/03/25 0701)  Pulse: 63 (05/03/25 0701)  Resp: 12 (05/03/25 0656)  BP: 136/80 (05/03/25 0615)  SpO2: 100 % (05/03/25 0656) Vital Signs (24h Range):  Temp:  [97.4 °F (36.3 °C)-100.6 °F (38.1 °C)] 97.4 °F (36.3 °C)  Pulse:  [] 63  Resp:  [11-61] 12  SpO2:  [67 %-100 %] 100 %  BP: ()/() 136/80     Weight: 59.9 kg (132 lb 0.9 oz)  Body mass index is 20.68 kg/m².      Intake/Output Summary (Last 24 hours) at 5/3/2025 1100  Last data filed at 5/3/2025 0655  Gross per 24 hour   Intake 836.13 ml   Output 830 ml   Net 6.13 ml        Physical Exam  Vitals and nursing note reviewed.   Constitutional:       Appearance: He is ill-appearing.   Cardiovascular:      Rate and Rhythm: Regular rhythm. Tachycardia present.   Pulmonary:      Comments: Stable on nasal canula.   Abdominal:      General: Bowel sounds are normal.      Palpations: Abdomen is soft.      Tenderness: There is no abdominal tenderness.   Genitourinary:     Comments: camejo  Musculoskeletal:      Right lower leg: No edema.      Left lower leg: No edema.   Skin:     General: Skin is warm and dry.   Neurological:      Mental Status: Mental status is at baseline.      Comments: Arousable but confuse.             Review of Systems    Vents:  Oxygen Concentration (%): 28 (05/02/25 0814)    Lines/Drains/Airways       Drain  Duration                  Urethral Catheter 04/28/25 2230 Coude 14 Fr. 4 days              Peripheral Intravenous Line  Duration                  Peripheral IV - Single Lumen 04/30/25 1400 22 G Anterior;Distal;Right Upper Arm 2 days         Peripheral IV - Single Lumen 05/02/25 2159 20 G Anterior;Distal;Left Upper Arm <1 day                    Significant Labs:    CBC/Anemia Profile:  Recent Labs   Lab 05/02/25  0348 05/03/25  0351   WBC 6.64 5.66   HGB 12.0* 11.8*   HCT 37.9* 37.2*    191   MCV 86 85   RDW 14.4 14.6*       "  Chemistries:  Recent Labs   Lab 05/01/25  1243 05/02/25  0348 05/03/25  0351    144 148*   K 3.7 3.5 4.2    108 112*   CO2 25 23 21*   BUN 44* 41* 39*   CREATININE 1.1 0.9 1.0   CALCIUM 9.5 9.7 9.3     BNP  Recent Labs   Lab 04/29/25  1206   *         ABGs:   No results for input(s): "PH", "PCO2", "HCO3", "POCSATURATED", "BE" in the last 48 hours.    Blood Culture: No results for input(s): "LABBLOO" in the last 48 hours.  Respiratory Culture: No results for input(s): "GSRESP", "RESPIRATORYC" in the last 48 hours.  Echo 4/3/25       Left Ventricle: The left ventricle is normal in size. Normal wall thickness. Mild global hypokinesis present. There is mildly reduced systolic function with a visually estimated ejection fraction of 45 - 50%.    Right Ventricle: The right ventricle is normal in size. Systolic function is normal.    Aorta: Aortic root is mildly dilated measuring 3.82 cm.    Similar findings noted on prior report dated 3/17/25.    Significant Imaging:   I have reviewed all pertinent imaging results/findings within the past 24 hours.  CT: I have reviewed all pertinent results/findings within the past 24 hours and my personal findings are:  4/28/25 no overt ggo.  +patchy septal reticulation and honeycombing predominantly at bases.  This is similar to findings on 3/30/25 CT but has worsen when compared to CTA 7/7/23  CXR: I have reviewed all pertinent results/findings within the past 24 hours and my personal findings are:  4/29/25 increase reticular markings.  No overt consolidation    ABG  Recent Labs   Lab 04/29/25  1559   PH 7.381   PO2 66*   PCO2 46.3*   HCO3 27.4   BE 2     Assessment/Plan:     Neuro  Delirium  Sleepy during the day and agitated at night.  Unable to tolerate oral seroquel, zyprexa iv.    Ngt placed.  Will start seroquel.    Agitation worse around late afternoon and evening.  I suspect a component of sundowning compounded by rib pain.  May need iv haldol +/- ketamine " if agitation is uncontrol.       Acute metabolic encephalopathy  I suspect a component of baseline dementia + pain meds   Abg without co2 retention.    More alert today    Pulmonary  Acute on chronic respiratory failure with hypoxia  Hypoxemic requiring high flow nasal canula  Suspect a combination of baseline IPF + atelectatic changes a/w rib fracture +/- volume overload  Will monitor for aspiration given mental status.  Back to nasal canula.  Oxygen requirement when not agitated.  Cxr 5/2/25 without evidence of aspiration      IPF (idiopathic pulmonary fibrosis)  Followed by Dr. Schmitt as an outpatient  Do not have baseline pft due to poor cooperation  Home oxygen  Was on ofev and cellcept prior to hospice.    Repeat ct chest without acute changes.    Resume ofev and cellcept once tolerating po intake.     Cardiac/Vascular  Chronic diastolic congestive heart failure  EF 40%  Difficulty to appreciate pulmonary edema on cxr given baseline pft.  Clinical exam without overt evidence of volume overload  Bnp 400.  Lasix 40 mg iv x 1 given.  Lasix held given bump in creatinine and improved oxygenation.      Endocrine  Type 2 diabetes mellitus without complication, without long-term current use of insulin  5/1 Hyperglycemia with  with anion gap of 19.  Beta hydroxybutyrate elevated  Lantus increase by Dr. Parr  iss    Orthopedic  * Rib fractures  Pain control with toradol and prn morphine  Regional block per anesthesiology.  Difficult assessing pain given ams.  However, patient is calm and sleepy this am.  Toradol restarted.    Ngt placed this am.  Nsaids + tylenol    Palliative Care  Advance care planning  4/29/25 Given progression of IPF, patient was discharged to home hospice.  Per NP Alfredito, patient activated 911 after fall.  Currently full code.  On going goc discussion with son and palliative care.    4/30/25 spoken with son, Dr. Grant, and he agreed to DNR.  He is also agreeable to home hospice at the  time of discharge.  Unclear if patient will still be living in [facility] after discharge.   is working to explore various options available.    5/2/25 most  likely nursing home upon discharge.    5/3/25 Dr. Ta's note appreciated.  Continue with current POC       Critical Care Daily Checklist:    A: Awake: RASS Goal/Actual Goal:    Actual:     B: Spontaneous Breathing Trial Performed?     C: SAT & SBT Coordinated?  N/a                      D: Delirium: CAM-ICU     E: Early Mobility Performed? No   F: Feeding Goal:    Status:     Current Diet Order   Procedures    Diet Clear Liquid      AS: Analgesia/Sedation Nsaids, precedex, prn haldol   T: Thromboembolic Prophylaxis lovenox   H: HOB > 300 Yes   U: Stress Ulcer Prophylaxis (if needed) N/a   G: Glucose Control Iss    B: Bowel Function Stool Occurrence: 0   I: Indwelling Catheter (Lines & Camejo) Necessity camejo   D: De-escalation of Antimicrobials/Pharmacotherapies N/a    Plan for the day/ETD Ngt     Code Status:  Family/Goals of Care: DNR       Critical Care Time: 45 minutes  Critical secondary to Patient has a condition that poses threat to life and bodily function: severe delerium and respiratory failure      Critical care was time spent personally by me on the following activities: development of treatment plan with patient or surrogate and bedside caregivers, discussions with consultants, evaluation of patient's response to treatment, examination of patient, ordering and performing treatments and interventions, ordering and review of laboratory studies, ordering and review of radiographic studies, pulse oximetry, re-evaluation of patient's condition. This critical care time did not overlap with that of any other provider or involve time for any procedures.     Pino Urena MD  Critical Care Medicine  [facility] Intensive Care

## 2025-05-03 NOTE — EICU
Intervention Initiated From:  COR / TERRIEU    Edison intervened regarding:  Rounding (Video assessment)    VICU Night Rounds Checklist  24H Vital Sign Range:  Temp:  [97.6 °F (36.4 °C)-100.6 °F (38.1 °C)]   Pulse:  []   Resp:  [13-61]   BP: ()/()   SpO2:  [67 %-100 %]     Video rounds and LDA reconciliation

## 2025-05-03 NOTE — PLAN OF CARE
Pt remains in ICU, restless and agitated, moaning in pain. NSR in the monitor except while moaning with pain.  Gtt. Preceded @1mcg infusing continiously. Scheduled pain meds and PRN Morphine 4 mg twice, Halodol once administered with no relief.   NG tube in place, Tube feeding started, running @40ml/hr with a goal rate of 55/hr. Antonio in place with low urin output.  Pt desaturated at the end of the shift, Anti suctioning done. No able to obtain oxygen saturation.  POC explained to family. Q2 hrly turned. No falls, injuires and skin breakdown during the shift.  Problem: Adult Inpatient Plan of Care  Goal: Plan of Care Review  Outcome: Progressing  Goal: Patient-Specific Goal (Individualized)  Outcome: Progressing  Goal: Absence of Hospital-Acquired Illness or Injury  Outcome: Progressing  Goal: Optimal Comfort and Wellbeing  Outcome: Progressing  Goal: Readiness for Transition of Care  Outcome: Progressing     Problem: Infection  Goal: Absence of Infection Signs and Symptoms  Outcome: Progressing     Problem: Diabetes Comorbidity  Goal: Blood Glucose Level Within Targeted Range  Outcome: Progressing     Problem: Skin Injury Risk Increased  Goal: Skin Health and Integrity  Outcome: Progressing     Problem: Coping Ineffective  Goal: Effective Coping  Outcome: Progressing     Problem: Fall Injury Risk  Goal: Absence of Fall and Fall-Related Injury  Outcome: Progressing     Problem: Pain Acute  Goal: Optimal Pain Control and Function  Outcome: Progressing

## 2025-05-03 NOTE — PROGRESS NOTES
[facility]  Progress Note    Patient Name: Emmanuel Grant  MRN: [id number]  Patient Class: IP- Inpatient   Admission Date: 4/27/2025  Length of Stay: 5 days  Attending Physician: Nargis Parr MD  Primary Care Provider: Wilfredo De Souza MD        Subjective     Principal Problem:Rib fractures        HPI:  Emmanuel Grant is a 73 y/o male with PMHx of HTN, DM2, CVA, CHF, and pulmonary fibrosis with chronic respiratory failure on home oxygen and hospice who presented to ED with right shoulder and rib pain after a fall early this morning.  He states he got up to use the bathroom and he lost his balance.  He landed on his right side and immediately had pain.  He got himself into a chair and called EMS.  No head injury or LOC.  In the ED, he was found to have multiple right-sided rib fractures, CT chest demonstrates multiple nondisplaced and minimally displaced rib fractures are identified including fractures of the 3rd, 4th, 5th, 6th, 7th, and 8th ribs laterally and the 5th rib posteriorly. No pneumothorax and oxygen saturations are 97% on his regular home oxygen of 3L via NC.  He is being admitted for pain control.    Overview/Hospital Course:  Mr Emmanuel Grant is a 74 y.o. man with pulmonary fibrosis on 3L home O2, frequent falls at home, on home hospice who fell, called EMS, and was admitted with R rib fractures 3-8. Started pain control. However, he worsened, becoming febrile, confused, and more hypoxic. He was moved to ICU on 4/29, started antibiotics. Pulmonary, Palliative consulted. His respiratory status has improved. Anesthesia performed BETTY block on 4/30. He became alert on 5/2/25 but still delirious, especially in the afternoon/evening on 5/2 when he became hypertensive, tachycardic, tachypneic, and screaming out in pain despite zyprexa, ativan, fentanyl, morphine, and his family at bedside trying to comfort him. Discussed with [place] [facility] [place]- all  they would offer would be another block. Discussed with family- not yet ready for comfort focused care. He has not had sufficient nutrition in a week, and he cannot keep oral medications down which makes multimodal pain control difficult. Plan NGT placement for feeds and other pain med options besides IV opioids.     Interval History: This morning he is sedated. He is on precedex. He does not wake up to speak to me.     Review of Systems   Unable to perform ROS: Mental status change     Objective:     Vital Signs (Most Recent):  Temp: 97.4 °F (36.3 °C) (05/03/25 0701)  Pulse: 63 (05/03/25 0656)  Resp: 12 (05/03/25 0656)  BP: 136/80 (05/03/25 0615)  SpO2: 100 % (05/03/25 0656) Vital Signs (24h Range):  Temp:  [97.4 °F (36.3 °C)-100.6 °F (38.1 °C)] 97.4 °F (36.3 °C)  Pulse:  [] 63  Resp:  [11-61] 12  SpO2:  [67 %-100 %] 100 %  BP: ()/() 136/80     Weight: 59.9 kg (132 lb 0.9 oz)  Body mass index is 20.68 kg/m².    Intake/Output Summary (Last 24 hours) at 5/3/2025 1032  Last data filed at 5/3/2025 0655  Gross per 24 hour   Intake 836.13 ml   Output 830 ml   Net 6.13 ml         Physical Exam  Vitals and nursing note reviewed.   Constitutional:       Appearance: He is ill-appearing.   Cardiovascular:      Rate and Rhythm: Normal rate and regular rhythm.   Pulmonary:      Effort: Pulmonary effort is normal.      Breath sounds: Rhonchi and rales present.      Comments: 4L NC  Abdominal:      General: Bowel sounds are normal.      Palpations: Abdomen is soft.      Tenderness: There is no abdominal tenderness.   Genitourinary:     Comments: camejo  Musculoskeletal:      Right lower leg: No edema.      Left lower leg: No edema.   Skin:     General: Skin is warm and dry.   Neurological:      Mental Status: He is disoriented.               Significant Labs: All pertinent labs within the past 24 hours have been reviewed.    Significant Imaging: I have reviewed all pertinent imaging results/findings within the  past 24 hours.      Assessment & Plan  Rib fractures  - due to mechanical fall at home  - pain control with lidocaine patch, scheduled tylenol suppository, scheduled ketorolac (already completed 1 3 day course, now on the 2nd 3 day course), PRN morphine/fentanyl  - Anesthesia completed BETTY block on 4/30  - discussed with Anesthesia at [facility] via [place] center on 5/2/25-- they would perform another block, but that has already been tried and was minimally effective   - his pain seems much worse when his delirium is worse at nighttime  - however, his pain and delirium are uncontrolled    Plan  - place NGT  - start scheduled tylenol 1g QID, ibuprofen 800mg TID, robaxin 1000 QID, gabapentin 300QHS-- all of this with goal to decrease opioids so that he is more awake and alert. Continue lidocaine patches  - start scheduled seroquel 25 BID  - start tube feeds and free water flushes  - ideally would also be awake and alert, be able to use incentive spirometer and participate with PT/OT  - completed course of ketorolac  - incentive spirometry if he can wake up to do it  - continue home O2 3L NC  - per Dr Rey note 5/2/25, family is not ready for comfort care at this time.   - will complete course of cefepime for aspiration- end date set   Chronic diastolic congestive heart failure  Patient has Diastolic (HFpEF) heart failure that is Chronic. On presentation their CHF was well compensated.     Echo 4/3/25    Left Ventricle: The left ventricle is normal in size. Normal wall thickness. Mild global hypokinesis present. There is mildly reduced systolic function with a visually estimated ejection fraction of 45 - 50%.    Right Ventricle: The right ventricle is normal in size. Systolic function is normal.    Aorta: Aortic root is mildly dilated measuring 3.82 cm.    Similar findings noted on prior report dated 3/17/25.    Current Heart Failure Medications  hydrALAZINE injection 10 mg, Every 6 hours PRN, Intravenous    Plan  - Place  on telemetry  - he is currently too sedated to take home Rx  - hold lasix          Type 2 diabetes mellitus without complication, without long-term current use of insulin  Lab Results   Component Value Date    HGBA1C 7.9 (H) 05/01/2025     - lantus 15QD  - glucose checks with low dose SSI  - hold home oral DM meds  - will start tube feeds once NGT in place and will increase insulin at that time     Essential hypertension  Patient's blood pressure range in the last 24 hours was: BP  Min: 69/51  Max: 189/102.The patient's inpatient anti-hypertensive regimen is listed below:  Current Antihypertensives  hydrALAZINE injection 10 mg, Every 6 hours PRN, Intravenous    Plan  - BP is controlled, no changes needed to their regimen  Coronary artery disease involving 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 Continue to monitor on telemetry.   - note last angiogram 9/2024 with patient LAD stent   - currently unable to swallow asa  BPH (benign prostatic hyperplasia)  - unable to swallow flomax currently  - DC camejo for voiding trial    H/O: CVA (cerebrovascular accident)  - noted     Immunosuppression due to drug therapy  - for IPF- see IPF    Anemia, chronic disease  Anemia is likely due to chronic blood loss. Most recent hemoglobin and hematocrit are listed below.  Recent Labs     05/01/25  0511 05/02/25  0348 05/03/25  0351   HGB 12.0* 12.0* 11.8*   HCT 36.8* 37.9* 37.2*     Plan  - Monitor serial CBC: Daily  - Transfuse PRBC if patient becomes hemodynamically unstable, symptomatic or H/H drops below 7/21.  - Patient has not received any PRBC transfusions to date  - Patient's anemia is currently stable    IPF (idiopathic pulmonary fibrosis)  - appeared at baseline upon admission  - on 3L home O2  - continues on home nintedanib if he can swallow it (not currently)  - holding mycophenolate  with concerns for infection   Acute on chronic respiratory failure with hypoxia  Patient admitted with Hypoxic which is Acute on Chronic.  he is on home oxygen at 3 LPM. Signs/symptoms of respiratory failure include- tachypnea and increased work of breathing present on admission.   - Labs and images were reviewed. Patient Has recent ABG, which has been reviewed..   - Supplemental oxygen was provided and noted- 3L NC  - Respiratory failure is due to- Aspiration, Interstitial lung disease, and rib fractures   - rib fractures- see rib fractures  - ILD: continue home Rx if he can tolerate but hold mycophenolate with concerns for infection. Continue inhalers  - fever noted, concern for aspiration: started cefepime- continue x5 day course  - has HFpEF but currently appears eu/hypovolemic- hold lasix  - Pulmonary following    Advance care planning  Advance Care Planning     Date: 04/29/2025  - Palliative consulted and discussed with family  - note he is on home hospice  - currently continue care but with limitation- no chest compressions, ok for intubation if needed  - family is flying in town  - time spent discussing with palliative and reviewing documentation= 5 minutes     Advance Care Planning     Date: 04/30/2025  - palliative met with family again today  - DNR/DNI code status  - time spent reviewing= 5 minutes              Acute metabolic encephalopathy  - worsening on 4/28-29  - suspect due to sedative medications given for pain control  - he needs pain/anxiety control but will try to limit ativan use  - schedule seroquel BID once NGT verified    Delirium  - see AMS    VTE Risk Mitigation (From admission, onward)           Ordered     enoxaparin injection 30 mg  Every 12 hours         05/01/25 1027     Reason for No Pharmacological VTE Prophylaxis  Once        Question:  Reasons:  Answer:  Risk of Bleeding    04/27/25 1755     IP VTE HIGH RISK PATIENT  Once         04/27/25 1755     Place sequential compression  device  Until discontinued         04/27/25 8833                    Discharge Planning   DERIC:      Code Status: DNR   Medical Readiness for Discharge Date:   Discharge Plan A: New Nursing Home placement - [facility] care facility        Critical care time spent on the evaluation and treatment of severe organ dysfunction, review of pertinent labs and imaging studies, discussions with consulting providers and discussions with patient/family: 45 minutes.    12:49 PM  Moaning constantly. Given IV morphine. Will add oxycodone 10mg liquid BID via NGT. Hyperglycemic- add scheduled aspart now that we are starting tube feeds.     2:56 PM  Called son Dr Emmanuel Grant Jr to update him. All questions answered.     3:56 PM  Moaning constantly in pain, tachycardic. Given pain Rx, given haldol as well. Concern part of this may be delirium as it worsens around 4PM every day.       Nargis Parr MD  Department of Hospital Medicine   [facility] - Intensive Care

## 2025-05-03 NOTE — EICU
Intervention Initiated From:  COR / EICU    Edison intervened regarding:  Rounding (Video assessment)    Virtual ICU Quality Rounds    Admit Date: 4/27/2025  Hospital Day: 5    ICU Day: 4d 1h    24H Vital Sign Range:  Temp:  [97.6 °F (36.4 °C)-100.6 °F (38.1 °C)]   Pulse:  []   Resp:  [11-61]   BP: ()/()   SpO2:  [67 %-100 %]     VICU Surveillance Screening    Daily review of  line necessity(optional): Urinary catheter in place                LDA reconciliation : Yes            Camejo best practices : Nurse driven camejo removal protocol ordered, Prompt to consider removal if no urine output in past 24hrs or ESRD, Prompt alternatives(external catheters, in/ou cath, bladder scanning), Initiate bladder management algorithm for patients with urinary retention, Camejo securement device in place with an intact seal visualized, and Sheet clips are used to prevent dependent loops, keep the bag below the bladder, and keep the bag off the ground

## 2025-05-03 NOTE — ASSESSMENT & PLAN NOTE
Pain control with toradol and prn morphine  Regional block per anesthesiology.  Difficult assessing pain given ams.  However, patient is calm and sleepy this am.  Toradol restarted.    Ngt placed this am.  Nsaids + tylenol

## 2025-05-03 NOTE — ASSESSMENT & PLAN NOTE
Sleepy during the day and agitated at night.  Unable to tolerate oral seroquel, zyprexa iv.    Ngt placed.  Will start seroquel.    Agitation worse around late afternoon and evening.  I suspect a component of sundowning compounded by rib pain.  May need iv haldol +/- ketamine if agitation is uncontrol.

## 2025-05-03 NOTE — ASSESSMENT & PLAN NOTE
Lab Results   Component Value Date    HGBA1C 7.9 (H) 05/01/2025     - lantus 15QD  - glucose checks with low dose SSI  - hold home oral DM meds  - will start tube feeds once NGT in place and will increase insulin at that time

## 2025-05-03 NOTE — ASSESSMENT & PLAN NOTE
Patient's blood pressure range in the last 24 hours was: BP  Min: 69/51  Max: 189/102.The patient's inpatient anti-hypertensive regimen is listed below:  Current Antihypertensives  hydrALAZINE injection 10 mg, Every 6 hours PRN, Intravenous    Plan  - BP is controlled, no changes needed to their regimen

## 2025-05-04 PROBLEM — J93.0 TENSION PNEUMOTHORAX: Status: ACTIVE | Noted: 2025-01-01

## 2025-05-04 PROBLEM — R57.9 SHOCK: Status: ACTIVE | Noted: 2025-01-01

## 2025-05-04 PROBLEM — S27.0XXA TRAUMATIC PNEUMOTHORAX: Status: ACTIVE | Noted: 2025-01-01

## 2025-05-04 PROBLEM — K29.01 GASTROINTESTINAL HEMORRHAGE ASSOCIATED WITH ACUTE GASTRITIS: Status: ACTIVE | Noted: 2025-01-01

## 2025-05-04 PROBLEM — N17.9 AKI (ACUTE KIDNEY INJURY): Status: ACTIVE | Noted: 2025-01-01

## 2025-05-04 NOTE — ASSESSMENT & PLAN NOTE
Patient has Diastolic (HFpEF) heart failure that is Chronic. On presentation their CHF was well compensated.     Echo 4/3/25    Left Ventricle: The left ventricle is normal in size. Normal wall thickness. Mild global hypokinesis present. There is mildly reduced systolic function with a visually estimated ejection fraction of 45 - 50%.    Right Ventricle: The right ventricle is normal in size. Systolic function is normal.    Aorta: Aortic root is mildly dilated measuring 3.82 cm.    Similar findings noted on prior report dated 3/17/25.    Current Heart Failure Medications  hydrALAZINE injection 10 mg, Every 6 hours PRN, Intravenous  NORepinephrine 4 mg in dextrose 5% 250 mL infusion (premix), Continuous, Intravenous    Plan  - Place on telemetry  - he is currently too sedated to take home Rx  - hold lasix

## 2025-05-04 NOTE — ASSESSMENT & PLAN NOTE
Lab Results   Component Value Date    HGBA1C 7.9 (H) 05/01/2025     - lantus 15QD  - glucose checks with low dose SSI  - hold home oral DM meds  - had tried to start TF on 5/3/25, but he vomited into BiPAP and now has coffee grounds in NGT-- hold tube feeds

## 2025-05-04 NOTE — EICU
Virtual ICU Quality Rounds    Admit Date: 4/27/2025  Hospital Day: 6    ICU Day: 5d 11h    24H Vital Sign Range:  Temp:  [95.9 °F (35.5 °C)-98.6 °F (37 °C)]   Pulse:  []   Resp:  [7-52]   BP: ()/(21-94)   SpO2:  [79 %-100 %]     VICU Surveillance Screening    Daily review of  line necessity(optional): Central line(s) in place and Urinary catheter in place    Central line type (optional): PICC    Central line indications : Vasopressors (in past 24/hrs)    Antonio Indications : Critically ill in the intensive care unit requiring hourly monitoring     LDA reconciliation : Yes

## 2025-05-04 NOTE — ASSESSMENT & PLAN NOTE
EKATERINA is likely due to acute tubular necrosis caused by hypotension. Baseline creatinine is 1. Most recent creatinine and eGFR are listed below.  Recent Labs     05/02/25  0348 05/03/25  0351 05/04/25  0349   CREATININE 0.9 1.0 1.6*   EGFRNORACEVR >60 >60 45*      Plan  - EKATERINA is due to atn a/w hypotension  - Avoid nephrotoxins and renally dose meds for GFR listed above  - Monitor urine output, serial BMP, and adjust therapy as needed  - ivf

## 2025-05-04 NOTE — ASSESSMENT & PLAN NOTE
Tension pneumo on 5/3  S/p 14 Cymraes with resolution of pneumo  Continue chest tube to suction for now

## 2025-05-04 NOTE — ASSESSMENT & PLAN NOTE
Patient's blood pressure range in the last 24 hours was: BP  Min: 38/21  Max: 198/117.The patient's inpatient anti-hypertensive regimen is listed below:  Current Antihypertensives  hydrALAZINE injection 10 mg, Every 6 hours PRN, Intravenous    Plan  - all Rx off since he is now on levophed

## 2025-05-04 NOTE — ASSESSMENT & PLAN NOTE
Hypoxemic requiring high flow nasal canula  Suspect a combination of baseline IPF + atelectatic changes a/w rib fracture +/- volume overload  Will monitor for aspiration given mental status.  Tension pneumothorax on 5/3 s/p 14 Dominican chest tube  Continue with chest tube to suction for now.

## 2025-05-04 NOTE — NURSING
Dr Rey informed that pt's HR is elevating while BP dropping. SATs unable to , when they do in the mid 80s on nonrebreather. MD confirmed with son at the bedside that medical mngt is the plan at this time. NG tube feeding stopped prior to shift change, left off as pt may possibly go on Bipap. Confirmed with MD that ok to keep tube feed off at this time.

## 2025-05-04 NOTE — ED PROVIDER NOTES
Chest Tube    Date/Time: 2025 12:20 AM  Location procedure was performed: [facility] ICU    Performed by: Dionicio Moon MD  Authorized by: Nargis Parr MD  Consent Done: Yes  Consent: Written consent obtained  Consent given by: guardian  Patient understanding: patient states understanding of the procedure being performed  Patient consent: the patient's understanding of the procedure matches consent given  Procedure consent: procedure consent matches procedure scheduled  Relevant documents: relevant documents present and verified  Patient identity confirmed: , name and MRN  Indications: pneumothorax    Patient sedated: yes  Sedation type: moderate (conscious) sedation    Sedatives: see MAR for details  Vitals: Vital signs were monitored during sedation.  Anesthesia: local infiltration    Anesthesia:  Local Anesthetic: lidocaine 1% without epinephrine  Anesthetic total: 5 mL  Preparation: skin prepped with Betadine and skin prepped with ChloraPrep  Placement location: right lateral  Scalpel size: 11  Tube size: 16 Czech  Ultrasound guidance: no  Tension pneumothorax heard: no  Tube connected to: water seal  Suture material: 0 silk  Dressing: petrolatum-impregnated gauze  Post-insertion x-ray findings: tube in good position  Patient tolerance: Patient tolerated the procedure well with no immediate complications  Complications: No  Estimated blood loss (mL): 3  Specimens: No  Implants: No             Dionicio Moon MD  25 0023

## 2025-05-04 NOTE — ASSESSMENT & PLAN NOTE
- appeared at baseline upon admission  - on 3L home O2  - holding home nintedanib as he cannot swallow it  - holding mycophenolate with concerns for infection

## 2025-05-04 NOTE — PROCEDURES
"Emmanuel Grant is a 74 y.o. male patient.    Temp: 98.6 °F (37 °C) (05/04/25 0317)  Pulse: (!) 121 (05/04/25 0317)  Resp: 15 (05/04/25 0317)  BP: 104/64 (05/04/25 0317)  SpO2: 100 % (05/04/25 0317)  Weight: 59.9 kg (132 lb 0.9 oz) (04/28/25 2300)  Height: 5' 7" (170.2 cm) (04/28/25 2300)    PICC  Date/Time: 5/4/2025 3:45 AM  Performed by: Noble Steward RN  Consent Done: Yes  Time out: Immediately prior to procedure a time out was called to verify the correct patient, procedure, equipment, support staff and site/side marked as required  Indications: med administration and vascular access  Anesthesia: local infiltration  Local anesthetic: lidocaine 1% without epinephrine  Anesthetic Total (mL): 5  Preparation: skin prepped with ChloraPrep  Skin prep agent dried: skin prep agent completely dried prior to procedure  Sterile barriers: all five maximum sterile barriers used - cap, mask, sterile gown, sterile gloves, and large sterile sheet  Hand hygiene: hand hygiene performed prior to central venous catheter insertion  Location details: left basilic  Catheter type: triple lumen  Catheter size: 5 Fr  Catheter Length: 41cm    Ultrasound guidance: yes  Vessel Caliber: medium, compressibility normal  Needle advanced into vessel with real time Ultrasound guidance.  Guidewire confirmed in vessel.  Sterile sheath used.  Number of attempts: 1  Post-procedure: blood return through all ports, chlorhexidine patch and sterile dressing applied            Name noble steward  5/4/2025    "

## 2025-05-04 NOTE — SUBJECTIVE & OBJECTIVE
Interval History: patient developed tension pneumothorax requiring 14 Palestinian chest tube.  Hemodynamics much improve with decreasing pressor requirement.  +coffee ground emesis per OGT.  Worsening renal function.      Objective:     Vital Signs (Most Recent):  Temp: 97.5 °F (36.4 °C) (05/04/25 0701)  Pulse: 100 (05/04/25 0937)  Resp: (!) 27 (05/04/25 0937)  BP: 101/62 (05/04/25 0937)  SpO2: (!) 93 % (05/04/25 0937) Vital Signs (24h Range):  Temp:  [95.9 °F (35.5 °C)-98.6 °F (37 °C)] 97.5 °F (36.4 °C)  Pulse:  [] 100  Resp:  [7-52] 27  SpO2:  [79 %-100 %] 93 %  BP: ()/() 101/62     Weight: 59.9 kg (132 lb 0.9 oz)  Body mass index is 20.68 kg/m².      Intake/Output Summary (Last 24 hours) at 5/4/2025 0958  Last data filed at 5/4/2025 0800  Gross per 24 hour   Intake 2165.32 ml   Output 900 ml   Net 1265.32 ml        Physical Exam  Vitals and nursing note reviewed.   Constitutional:       Appearance: He is ill-appearing.   Cardiovascular:      Rate and Rhythm: Regular rhythm. Tachycardia present.   Pulmonary:      Comments: Right axillary chest tube in place.    Abdominal:      General: Bowel sounds are normal.      Palpations: Abdomen is soft.      Tenderness: There is no abdominal tenderness.   Genitourinary:     Comments: camejo  Musculoskeletal:      Right lower leg: No edema.      Left lower leg: No edema.   Skin:     General: Skin is warm and dry.   Neurological:      Mental Status: Mental status is at baseline.      Comments: Arousable but confuse.             Review of Systems    Vents:  Oxygen Concentration (%): 50 (05/04/25 0937)    Lines/Drains/Airways       Peripherally Inserted Central Catheter Line  Duration             PICC Triple Lumen 05/04/25 0345 left basilic <1 day              Drain  Duration                  Urethral Catheter 04/28/25 2230 Coude 14 Fr. 5 days         Chest Tube 05/04/25 0010 Tube - 1 Right 16 Fr. <1 day         NG/OG Tube 05/03/25 1842 16 Fr. Left nostril <1 day  "             Peripheral Intravenous Line  Duration                  Peripheral IV - Single Lumen 04/30/25 1400 22 G Anterior;Distal;Right Upper Arm 3 days                    Significant Labs:    CBC/Anemia Profile:  Recent Labs   Lab 05/03/25  0351 05/04/25  0349   WBC 5.66 4.38   HGB 11.8* 12.6*   HCT 37.2* 40.5    234   MCV 85 87   RDW 14.6* 15.0*        Chemistries:  Recent Labs   Lab 05/03/25  0351 05/04/25  0349   * 146*   K 4.2 3.2*   * 109   CO2 21* 23   BUN 39* 52*   CREATININE 1.0 1.6*   CALCIUM 9.3 8.8   MG  --  2.2   PHOS  --  3.4     BNP  Recent Labs   Lab 04/29/25  1206   *         ABGs:   Recent Labs   Lab 05/04/25  0646   PH 7.355   PCO2 40.6   HCO3 22.6*   POCSATURATED 97   BE -3*       Blood Culture: No results for input(s): "LABBLOO" in the last 48 hours.  Respiratory Culture: No results for input(s): "GSRESP", "RESPIRATORYC" in the last 48 hours.  Echo 4/3/25       Left Ventricle: The left ventricle is normal in size. Normal wall thickness. Mild global hypokinesis present. There is mildly reduced systolic function with a visually estimated ejection fraction of 45 - 50%.    Right Ventricle: The right ventricle is normal in size. Systolic function is normal.    Aorta: Aortic root is mildly dilated measuring 3.82 cm.    Similar findings noted on prior report dated 3/17/25.    Significant Imaging:   I have reviewed all pertinent imaging results/findings within the past 24 hours.  CT: I have reviewed all pertinent results/findings within the past 24 hours and my personal findings are:  4/28/25 no overt ggo.  +patchy septal reticulation and honeycombing predominantly at bases.  This is similar to findings on 3/30/25 CT but has worsen when compared to CTA 7/7/23  CXR: I have reviewed all pertinent results/findings within the past 24 hours and my personal findings are:  5/4/25 resolution of right pneumothorax.    "

## 2025-05-04 NOTE — ASSESSMENT & PLAN NOTE
- worsening on 4/28-29  - suspect due to sedative medications given for pain control  - he needs pain/anxiety control but will try to limit ativan use  - unfortunately with concerns for GIB, he cannot have PO Rx now

## 2025-05-04 NOTE — PROGRESS NOTES
[facility] Medicine  Progress Note    Patient Name: Emmanuel Grant  MRN: [id number]  Patient Class: IP- Inpatient   Admission Date: 4/27/2025  Length of Stay: 6 days  Attending Physician: Nargis Parr MD  Primary Care Provider: Wilfredo De Souza MD        Subjective     Principal Problem:Rib fractures        HPI:  Emmanuel Grant is a 75 y/o male with PMHx of HTN, DM2, CVA, CHF, and pulmonary fibrosis with chronic respiratory failure on home oxygen and hospice who presented to ED with right shoulder and rib pain after a fall early this morning.  He states he got up to use the bathroom and he lost his balance.  He landed on his right side and immediately had pain.  He got himself into a chair and called EMS.  No head injury or LOC.  In the ED, he was found to have multiple right-sided rib fractures, CT chest demonstrates multiple nondisplaced and minimally displaced rib fractures are identified including fractures of the 3rd, 4th, 5th, 6th, 7th, and 8th ribs laterally and the 5th rib posteriorly. No pneumothorax and oxygen saturations are 97% on his regular home oxygen of 3L via NC.  He is being admitted for pain control.    Overview/Hospital Course:  Mr Emmanuel Grant is a 74 y.o. man with pulmonary fibrosis on 3L home O2, frequent falls at home, on home hospice who fell, called EMS, and was admitted with R rib fractures 3-8. Started pain control. However, he worsened, becoming febrile, confused, and more hypoxic. He was moved to ICU on 4/29, started antibiotics. Pulmonary, Palliative consulted. His respiratory status has improved. Anesthesia performed BETTY block on 4/30. He became alert on 5/2/25 but still delirious, especially in the afternoon/evening on 5/2 when he became hypertensive, tachycardic, tachypneic, and screaming out in pain despite zyprexa, ativan, fentanyl, morphine, and his family at bedside trying to comfort him. Discussed with anesthesia at [facility] via [place] center- all  they would offer would be another block. Discussed with family- not yet ready for comfort focused care. He has not had sufficient nutrition in a week, and he cannot keep oral medications down which makes multimodal pain control difficult. Placed NGT placement for feeds and multimodal pain control. However, he worsened becoming tachycardic, hypotensive, hypoxic on nonrebreather. He was placed on BiPAP. He then developed tension pneumothorax. Chest tube was placed on 5/4 to suction.     Interval History: Overnight he developed worsening tachycardia, tachypnea, and hypoxia. He was placed on BiPAP. He vomited into BiPAP. He then developed hypotension and CXR noted R sided tension pneumothorax. Chest tube placed.     This AM, he is not awake and alert. He is on levophed. He is on vapotherm. He has coffee grounds in NGT.     Review of Systems   Unable to perform ROS: Mental status change     Objective:     Vital Signs (Most Recent):  Temp: 97.5 °F (36.4 °C) (05/04/25 0701)  Pulse: 100 (05/04/25 0937)  Resp: (!) 27 (05/04/25 0937)  BP: 101/62 (05/04/25 0937)  SpO2: (!) 93 % (05/04/25 0937) Vital Signs (24h Range):  Temp:  [95.9 °F (35.5 °C)-98.6 °F (37 °C)] 97.5 °F (36.4 °C)  Pulse:  [] 100  Resp:  [7-52] 27  SpO2:  [79 %-100 %] 93 %  BP: ()/() 101/62     Weight: 59.9 kg (132 lb 0.9 oz)  Body mass index is 20.68 kg/m².    Intake/Output Summary (Last 24 hours) at 5/4/2025 1005  Last data filed at 5/4/2025 0800  Gross per 24 hour   Intake 2157.83 ml   Output 900 ml   Net 1257.83 ml         Physical Exam  Vitals and nursing note reviewed.   Constitutional:       General: He is in acute distress.      Appearance: He is ill-appearing and toxic-appearing.   HENT:      Head:      Comments: Temporal wasting       Nose:      Comments: NGT in place with coffee grounds     Mouth/Throat:      Mouth: Mucous membranes are dry.   Cardiovascular:      Rate and Rhythm: Regular rhythm. Tachycardia present.   Pulmonary:       Breath sounds: Rhonchi and rales present.      Comments: Increased effort. On vapotherm. Chest tube on R to suction  Abdominal:      General: Bowel sounds are normal.      Palpations: Abdomen is soft.      Tenderness: There is no abdominal tenderness.   Genitourinary:     Comments: camejo  Musculoskeletal:      Right lower leg: No edema.      Left lower leg: No edema.   Skin:     General: Skin is warm and dry.   Neurological:      Mental Status: He is disoriented.               Significant Labs: All pertinent labs within the past 24 hours have been reviewed.    Significant Imaging: I have reviewed all pertinent imaging results/findings within the past 24 hours.      Assessment & Plan  Rib fractures  - due to mechanical fall at home  - Anesthesia completed BETTY block on 4/30  - [place] [facility] [place] they would perform another block, but that has already been tried and was minimally effective   - NGT in place for multimodal including PO meds, but now with coffee grounds in NGT-- holding all PO Rx  - continues on IV medications for pain control  - developed tension pneumothorax- see that problem  Chronic diastolic congestive heart failure  Patient has Diastolic (HFpEF) heart failure that is Chronic. On presentation their CHF was well compensated.     Echo 4/3/25    Left Ventricle: The left ventricle is normal in size. Normal wall thickness. Mild global hypokinesis present. There is mildly reduced systolic function with a visually estimated ejection fraction of 45 - 50%.    Right Ventricle: The right ventricle is normal in size. Systolic function is normal.    Aorta: Aortic root is mildly dilated measuring 3.82 cm.    Similar findings noted on prior report dated 3/17/25.    Current Heart Failure Medications  hydrALAZINE injection 10 mg, Every 6 hours PRN, Intravenous  NORepinephrine 4 mg in dextrose 5% 250 mL infusion (premix), Continuous, Intravenous    Plan  - Place on  telemetry  - he is currently too sedated to take home Rx  - hold lasix          Type 2 diabetes mellitus without complication, without long-term current use of insulin  Lab Results   Component Value Date    HGBA1C 7.9 (H) 05/01/2025     - lantus 15QD  - glucose checks with low dose SSI  - hold home oral DM meds  - had tried to start TF on 5/3/25, but he vomited into BiPAP and now has coffee grounds in NGT-- hold tube feeds     Essential hypertension  Patient's blood pressure range in the last 24 hours was: BP  Min: 38/21  Max: 198/117.The patient's inpatient anti-hypertensive regimen is listed below:  Current Antihypertensives  hydrALAZINE injection 10 mg, Every 6 hours PRN, Intravenous    Plan  - all Rx off since he is now on levophed  Coronary artery disease involving 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 Continue to monitor on telemetry.   - note last angiogram 9/2024 with patient LAD stent   - currently unable to swallow asa, and holding this with coffee ground in NGT  BPH (benign prostatic hyperplasia)  - unable to swallow flomax currently  - continues with camejo    H/O: CVA (cerebrovascular accident)  - noted     Immunosuppression due to drug therapy  - for IPF- see IPF    Anemia, chronic disease  Anemia is likely due to chronic blood loss. Most recent hemoglobin and hematocrit are listed below.  Recent Labs     05/02/25  0348 05/03/25  0351 05/04/25  0349   HGB 12.0* 11.8* 12.6*   HCT 37.9* 37.2* 40.5     Plan  - Monitor serial CBC: Daily  - Transfuse PRBC if patient becomes hemodynamically unstable, symptomatic or H/H drops below 7/21.  - Patient has not received any PRBC transfusions to date  - Patient's anemia is currently stable   - he has coffee grounds in NGT   - holding DVT ppx for this reason   - unable to use PPI due to allergy- on famotidine    IPF (idiopathic  pulmonary fibrosis)  - appeared at baseline upon admission  - on 3L home O2  - holding home nintedanib as he cannot swallow it  - holding mycophenolate with concerns for infection   Acute on chronic respiratory failure with hypoxia  Patient admitted with Hypoxic which is Acute on Chronic.  he is on home oxygen at 3 LPM. Signs/symptoms of respiratory failure include- tachypnea and increased work of breathing present on admission.   - Labs and images were reviewed. Patient Has recent ABG, which has been reviewed..   - Supplemental oxygen was provided and noted- 3L NC  - Respiratory failure is due to- Aspiration, Interstitial lung disease, and rib fractures, pneumothorax   - rib fractures- see rib fractures  - tension pneumothorax  - ILD: continue home Rx if he can tolerate but hold mycophenolate with concerns for infection. Continue inhalers  - fever noted, concern for aspiration- on antibiotics   - has HFpEF but currently appears eu/hypovolemic- hold lasix  - Pulmonary following    Advance care planning  Advance Care Planning     Date: 04/29/2025  - Palliative consulted and discussed with family  - note he is on home hospice  - currently continue care but with limitation- no chest compressions, ok for intubation if needed  - family is flying in town  - time spent discussing with palliative and reviewing documentation= 5 minutes     Advance Care Planning     Date: 04/30/2025  - palliative met with family again today  - DNR/DNI code status  - time spent reviewing= 5 minutes   Acute metabolic encephalopathy  - worsening on 4/28-29  - suspect due to sedative medications given for pain control  - he needs pain/anxiety control but will try to limit ativan use  - unfortunately with concerns for GIB, he cannot have PO Rx now     Delirium  - see AMS    Hypokalemia  Patient's most recent potassium results are listed below.   Recent Labs     05/02/25  0348 05/03/25  0351 05/04/25  0349   K 3.5 4.2 3.2*     Plan  - Replete  potassium per protocol  - Monitor potassium Daily  - Patient's hypokalemia is worsening. Will continue current treatment- electrolyte replacement protocol ordered  Tension pneumothorax  - noted 5/3/25 after requiring BiPAP for respiratory support  - chest tube placed on 5/3/25 to suction  - CXR now shows reexpanded lung  - chest tube remains in place, Pulmonary managing     EKATERINA (acute kidney injury)  EKATERINA is likely due to shock from tension pneumothorax; developed on 5/4/25. Baseline creatinine is 0.9. Most recent creatinine and eGFR are listed below.  Recent Labs     05/02/25  0348 05/03/25  0351 05/04/25  0349   CREATININE 0.9 1.0 1.6*   EGFRNORACEVR >60 >60 45*      Plan  - EKATERINA is worsening. Will continue current treatment  - Avoid nephrotoxins and renally dose meds for GFR listed above  - Monitor urine output, serial BMP, and adjust therapy as needed  - adding IVF  - holding all NSAIDs  Shock  This patient has shock. The type of shock is obstructive due to tension pneumothorax. The patient has the following evidence of shock: persistent hypotension, EKATERINA, altered mental status, and hypoxia.   - s/p chest tube placement 5/3/25 for tension pneumothorax  - repeat infectious workup with concerns for aspiration- vomited into BiPAP on 5/3/25. Continues on antibiotics  - continues on levophed   VTE Risk Mitigation (From admission, onward)           Ordered     enoxaparin injection 30 mg  Every 12 hours         05/01/25 1027     Reason for No Pharmacological VTE Prophylaxis  Once        Question:  Reasons:  Answer:  Risk of Bleeding    04/27/25 1755     IP VTE HIGH RISK PATIENT  Once         04/27/25 1755     Place sequential compression device  Until discontinued         04/27/25 1755                    Discharge Planning   DERIC:      Code Status: DNR   Medical Readiness for Discharge Date:   Discharge Plan A: New Nursing Home placement - senior living care facility        Family Dr Emmanuel Grant updated by Dr Urena at 10AM.      Critical care time spent on the evaluation and treatment of severe organ dysfunction, review of pertinent labs and imaging studies, discussions with consulting providers and discussions with patient/family: 45 minutes.            Nargis Parr MD  Department of Hospital Medicine   [facility] - Intensive Care

## 2025-05-04 NOTE — PLAN OF CARE
Problem: Adult Inpatient Plan of Care  Goal: Plan of Care Review  Outcome: Progressing  Goal: Patient-Specific Goal (Individualized)  Outcome: Progressing  Goal: Absence of Hospital-Acquired Illness or Injury  Outcome: Progressing  Goal: Optimal Comfort and Wellbeing  Outcome: Progressing  Goal: Readiness for Transition of Care  Outcome: Progressing     Problem: Infection  Goal: Absence of Infection Signs and Symptoms  Outcome: Progressing     Problem: Diabetes Comorbidity  Goal: Blood Glucose Level Within Targeted Range  Outcome: Progressing     Problem: Skin Injury Risk Increased  Goal: Skin Health and Integrity  Outcome: Progressing     Problem: Coping Ineffective  Goal: Effective Coping  Outcome: Progressing     Problem: Fall Injury Risk  Goal: Absence of Fall and Fall-Related Injury  Outcome: Progressing     Problem: Pain Acute  Goal: Optimal Pain Control and Function  Outcome: Progressing     Problem: Delirium  Goal: Optimal Coping  Outcome: Progressing  Goal: Improved Behavioral Control  Outcome: Progressing  Goal: Improved Attention and Thought Clarity  Outcome: Progressing  Goal: Improved Sleep  Outcome: Progressing     Problem: Acute Kidney Injury/Impairment  Goal: Fluid and Electrolyte Balance  Outcome: Progressing  Goal: Improved Oral Intake  Outcome: Progressing  Goal: Effective Renal Function  Outcome: Progressing

## 2025-05-04 NOTE — ASSESSMENT & PLAN NOTE
Anemia is likely due to chronic blood loss. Most recent hemoglobin and hematocrit are listed below.  Recent Labs     05/02/25  0348 05/03/25  0351 05/04/25  0349   HGB 12.0* 11.8* 12.6*   HCT 37.9* 37.2* 40.5     Plan  - Monitor serial CBC: Daily  - Transfuse PRBC if patient becomes hemodynamically unstable, symptomatic or H/H drops below 7/21.  - Patient has not received any PRBC transfusions to date  - Patient's anemia is currently stable   - he has coffee grounds in NGT   - holding DVT ppx for this reason   - unable to use PPI due to allergy- on famotidine

## 2025-05-04 NOTE — ASSESSMENT & PLAN NOTE
- due to mechanical fall at home  - Anesthesia completed BETTY block on 4/30  - discussed with Anesthesia at [facility] via transfer center on 5/2/25-- they would perform another block, but that has already been tried and was minimally effective   - NGT in place for multimodal including PO meds, but now with coffee grounds in NGT-- holding all PO Rx  - continues on IV medications for pain control  - developed tension pneumothorax- see that problem

## 2025-05-04 NOTE — ASSESSMENT & PLAN NOTE
EKATERINA is likely due to shock from tension pneumothorax; developed on 5/4/25. Baseline creatinine is 0.9. Most recent creatinine and eGFR are listed below.  Recent Labs     05/02/25  0348 05/03/25  0351 05/04/25  0349   CREATININE 0.9 1.0 1.6*   EGFRNORACEVR >60 >60 45*      Plan  - EKATERINA is worsening. Will continue current treatment  - Avoid nephrotoxins and renally dose meds for GFR listed above  - Monitor urine output, serial BMP, and adjust therapy as needed  - adding IVF  - holding all NSAIDs

## 2025-05-04 NOTE — ASSESSMENT & PLAN NOTE
This patient has shock. The type of shock is cardiogenic + tension. The patient has the following evidence of shock: persistent hypotension. The patient will be admitted to an intensive care unit, they will be treated with levophed.  Wean as tolerated.  .

## 2025-05-04 NOTE — NURSING
Unable to attain bp despite titrating levo up. Pt unresponsive, son at the bedside updated. Sats in the low 80s, uppper 70s.

## 2025-05-04 NOTE — CARE UPDATE
8:07 PM  I was called to the bedside to evaluate the patient for hypotension, tachycardia and hypoxia. Patient is more lethargic appearing than yesterday, still in respiratory distress. Patient's son is at bedside, I discussed patient's condition with him and current deterioration. He stated that he discussed with his brother, Emmanuel, who would like medical optimization for now.     ABG  Recent Labs   Lab 05/03/25  2030   PH 7.194*   PO2 48*   PCO2 69.3*   HCO3 26.7   BE -3*       - Placed on BiPAP- patient with minimal respiratory drive  - 500 ml of LR, reassess BP   - Chest X-ray   - Added vancomycin/Solu-medrol for possible component of infectious process/aspiration post NG tube or ILD flare  - Tube feeds are held   - Unfortunately, despite medical management, patient is likely going to pass soon.     8:56 PM  I called the patient's son Dr. Emmanuel Grant and updated him on the situation. He stated understanding and he will discuss with the rest of the family and let us know their decision.     10:11 PM  Patient with declining BP, barely initiating spontaneous breaths. Levophed ordered, BP remains low despite increasing levophed requirements. Discussed again with patient's son at bedside who is deferring the decision to switch to comfort care to his brother. He is aware that his dad will likely pass away soon. I called the patient's son, Dr. Grant, and explained further deterioration and likelihood of him passing away soon. He stated understanding. I informed him that pressors/BiPAP are not helping at this point.  I recommended comfort care measures. He did not want to make that decision yet, as he wanted to discuss with his other brother. He informed me that they will let the younger brother (at bedside) know of their final decision. Later, patient's Levophed requirements leveled, MAPs >65 now.     11:19 PM  Received a notification from radiology stating that the patient has a large sided pneumothorax. I  also received a message from the patient's nurse that the family is ready for comfort measures.      was at bedside. I met with the patient's son and ex-wife who informed me that they are ready for comfort measures. I mentioned to the patient's son that he does have a collapsed lung on his X-ray and its making his lung condition worse. We discussed switching to comfort measures, allowing him to pass which will likely happen overnight.     A few minutes after, Dr. Grant called wanting to discuss patient's pneumothorax and was wondering if further management is warranted. Given that family had already decided on switching to comfort measures, treatment options were not discussed at this point. We talked about treatment options and discussed possible chest tube. I informed him that would go against his goals of comfort measures but if that is something they would like, I can discuss with the surgical team. I also informed him that comfort care measures were recommended prior to receiving that information, and that putting him through a procedure like a chest tube, is unlikely to change the fact that mental status is poor, he is hypotensive requiring pressors and would unlikely to change the overall outcome. He stated that he wanted to discuss with his family before officially deciding.     I then got on a conference call with all brothers. I again explained the current situation and treatment options. We talked about how a chest tube can help his lung expand and might help in the short term, but it is unlikely going to change the overall outcome. Even with an expanded lung, patient has a chronic deteriorating lung condition. His mental status off sedation is poor and he is minimally responsive. He is requiring support with BiPAP and pressors. We discussed the quality of life that he will have moving forward, and that the chances of him making a recovery are low. Per Dr. Grant, patient expressed that he does  "not want to be intubated but "would like to live for 20 more years". Ultimately, it was decided that they want to hold off on comfort care measures at this time, and proceed with maximal medical management. Patient remains DNR, but for now will proceed with chest tube, PICC line, continuing pressors, antibiotics, steroids and monitoring ABGs/Labs.     Discussed with general surgery on call who recommended ER evaluation for a possible chest tube. Discussed with ER provider who was agreeable to place a chest tube.     12:23 AM  Chest tube placed, connected to suction. Lung appears more expanded. Updated family at bedside.    For now, plan is to continue to with medical management.  "

## 2025-05-04 NOTE — PROGRESS NOTES
"Pharmacokinetic Initial Assessment: IV Vancomycin    Assessment/Plan:    Initiate intravenous vancomycin with loading dose of 1250 mg once followed by a maintenance dose of vancomycin 1000mg IV every 24 hours  Desired empiric serum trough concentration is 10 to 20 mcg/mL  Draw vancomycin trough level 60 min prior to third dose on 5/5 at approximately 2100  Pharmacy will continue to follow and monitor vancomycin.      Please contact pharmacy at extension [phone] with any questions regarding this assessment.     Thank you for the consult,   Ike Calderon       Patient brief summary:  Emmanuel Grant is a 74 y.o. male initiated on antimicrobial therapy with IV Vancomycin for treatment of suspected lower respiratory infection    Drug Allergies:   Review of patient's allergies indicates:   Allergen Reactions    Dapagliflozin      Other reaction(s): Other (See Comments)    Pcn [penicillins]     Linagliptin Other (See Comments)     "it knocked me down", "it almost killed me"    Lisinopril Other (See Comments)     cough    Pantoprazole Hives       Actual Body Weight:   59.9 kg    Renal Function:   Estimated Creatinine Clearance: 56.6 mL/min (based on SCr of 1 mg/dL).,     Dialysis Method (if applicable):  N/A    CBC (last 72 hours):  Recent Labs   Lab Result Units 05/01/25  0511 05/02/25  0348 05/03/25  0351   WBC K/uL 7.88 6.64 5.66   HGB gm/dL 12.0* 12.0* 11.8*   Hemoglobin A1c % 7.9*  --   --    HCT % 36.8* 37.9* 37.2*   Platelet Count K/uL 213 164 191   Lymph % % 6.5* 11.6* 12.5*   Mono % % 7.9 9.9 12.7   Eos % % 0.1 3.0 3.2   Basophil % % 0.1 0.3 0.4       Metabolic Panel (last 72 hours):  Recent Labs   Lab Result Units 05/01/25  0511 05/01/25  1243 05/02/25  0348 05/03/25  0351   Sodium mmol/L 143 145 144 148*   Potassium mmol/L 4.4 3.7 3.5 4.2   Chloride mmol/L 105 105 108 112*   CO2 mmol/L 19* 25 23 21*   Glucose mg/dL 321* 292* 167* 212*   BUN mg/dL 43* 44* 41* 39*   Creatinine mg/dL 1.1 1.1 0.9 1.0       Drug " "levels (last 3 results):  No results for input(s): "VANCOMYCINRA", "VANCORANDOM", "VANCOMYCINPE", "VANCOPEAK", "VANCOMYCINTR", "VANCOTROUGH" in the last 72 hours.    Microbiologic Results:  Microbiology Results (last 7 days)       Procedure Component Value Units Date/Time    Blood culture [[id number]]  (Normal) Collected: 04/29/25 0519    Order Status: Completed Specimen: Blood Updated: 05/03/25 0700     Blood Culture No Growth After 96 hours    Blood culture [[id number]]  (Normal) Collected: 04/29/25 0533    Order Status: Completed Specimen: Blood Updated: 05/03/25 0700     Blood Culture No Growth After 96 hours            "

## 2025-05-04 NOTE — SUBJECTIVE & OBJECTIVE
Interval History: Overnight he developed worsening tachycardia, tachypnea, and hypoxia. He was placed on BiPAP. He vomited into BiPAP. He then developed hypotension and CXR noted R sided tension pneumothorax. Chest tube placed.     This AM, he is not awake and alert. He is on levophed. He is on vapotherm. He has coffee grounds in NGT.     Review of Systems   Unable to perform ROS: Mental status change     Objective:     Vital Signs (Most Recent):  Temp: 97.5 °F (36.4 °C) (05/04/25 0701)  Pulse: 100 (05/04/25 0937)  Resp: (!) 27 (05/04/25 0937)  BP: 101/62 (05/04/25 0937)  SpO2: (!) 93 % (05/04/25 0937) Vital Signs (24h Range):  Temp:  [95.9 °F (35.5 °C)-98.6 °F (37 °C)] 97.5 °F (36.4 °C)  Pulse:  [] 100  Resp:  [7-52] 27  SpO2:  [79 %-100 %] 93 %  BP: ()/() 101/62     Weight: 59.9 kg (132 lb 0.9 oz)  Body mass index is 20.68 kg/m².    Intake/Output Summary (Last 24 hours) at 5/4/2025 1005  Last data filed at 5/4/2025 0800  Gross per 24 hour   Intake 2157.83 ml   Output 900 ml   Net 1257.83 ml         Physical Exam  Vitals and nursing note reviewed.   Constitutional:       General: He is in acute distress.      Appearance: He is ill-appearing and toxic-appearing.   HENT:      Head:      Comments: Temporal wasting       Nose:      Comments: NGT in place with coffee grounds     Mouth/Throat:      Mouth: Mucous membranes are dry.   Cardiovascular:      Rate and Rhythm: Regular rhythm. Tachycardia present.   Pulmonary:      Breath sounds: Rhonchi and rales present.      Comments: Increased effort. On vapotherm. Chest tube on R to suction  Abdominal:      General: Bowel sounds are normal.      Palpations: Abdomen is soft.      Tenderness: There is no abdominal tenderness.   Genitourinary:     Comments: camejo  Musculoskeletal:      Right lower leg: No edema.      Left lower leg: No edema.   Skin:     General: Skin is warm and dry.   Neurological:      Mental Status: He is disoriented.                Significant Labs: All pertinent labs within the past 24 hours have been reviewed.    Significant Imaging: I have reviewed all pertinent imaging results/findings within the past 24 hours.

## 2025-05-04 NOTE — ASSESSMENT & PLAN NOTE
Patient with known CAD s/p stent placement, which is controlled Will continue ASA and monitor for S/Sx of angina/ACS. Continue to monitor on telemetry.   - note last angiogram 9/2024 with patient LAD stent   - currently unable to swallow asa, and holding this with coffee ground in NGT

## 2025-05-04 NOTE — ASSESSMENT & PLAN NOTE
Pain control with toradol and prn morphine  Regional block per anesthesiology.  Difficult assessing pain given ams.  However, patient is calm and sleepy this am.  Toradol restarted.    Ngt placed this am.    Nsaid held due to gib

## 2025-05-04 NOTE — ASSESSMENT & PLAN NOTE
Followed by Dr. Schmitt as an outpatient  Do not have baseline pft due to poor cooperation  Home oxygen  Was on ofev and cellcept prior to hospice.    Repeat ct chest without acute changes.    Resume ofev and cellcept once tolerating po intake.   Solumedrol iv started.  Low suspicion for IPF exacerbation.  Will stop solumedrol given hyperglycemia and concern about steroid worsening delerium

## 2025-05-04 NOTE — ASSESSMENT & PLAN NOTE
Patient was noted to have coffee ground emesis per ngt.  H/h stable.    Recent Labs     05/02/25  0348 05/03/25  0351 05/04/25  0349   HGB 12.0* 11.8* 12.6*   HCT 37.9* 37.2* 40.5    191 234     Plan  - Will trend hemoglobin/hematocrit Every 8 hours  - Will monitor and correct any coagulation defects  - Will transfuse if Hgb is <7g/dl (<8g/dl in cases of active ACS) or if patient has rapid bleeding leading to hemodynamic instability  - ppi started.    - lovenox for dvt prophylaxis is placed on hold.

## 2025-05-04 NOTE — PROGRESS NOTES
[facility] - Intensive Care  Critical Care Medicine  Progress Note    Patient Name: Emmanuel Grant  MRN: [id number]  Admission Date: 4/27/2025  Hospital Length of Stay: 6 days  Code Status: DNR  Attending Provider: Nargis Parr MD  Primary Care Provider: Wilfredo De Souza MD   Principal Problem: Rib fractures    Subjective:     HPI:  Patient is 74 y.o. male  has a past medical history of Acute hypoxic respiratory failure (09/23/2024), BPH (benign prostatic hyperplasia) (02/28/2024), CAD (coronary artery disease), Chronic HFrEF (heart failure with reduced ejection fraction) (05/01/2024), Diabetes mellitus, Hypertension, Kidney stone, Stroke, and Type 2 diabetes mellitus without complication, without long-term current use of insulin (10/08/2021). presented to [facility] on 4/28/25 s/p fall with shoulder and rib pain.  Cxr with multiple nondisplaced right rib fractures and patient was admitted for pain control.  Patient with worsening confusion along with fever over night and was transferred to ICU.      Patient was followed by Dr. Schmitt as an outpatient for IPF.  He was seen by Dr. Mix at [facility].  Patient was started on OFEV along with cellcept along with weaning regimen of prednisone.  Per Dr. Schmitt last note, patient was discharged with hospice.  Patient activated 911 after fall.      During my initial evaluation, patient was somnolent and groaning persistently.  HR 120s.  Bp stable.  Sating well on high flow nasal canula at 12 lpm.                 Additional Pulmonary History:  Childhood Illnesses:  none  Occupational:   works in air conditioning in installation and repair  Environmental:   no pets, no seasonal allergies, no carpet in his home and no concern for mold or allergy exposures there  Tobacco/Smoking:   never smoker    Hospital/ICU Course:  No notes on file    Interval History: patient developed tension pneumothorax requiring 14 Hungarian chest tube.  Hemodynamics much improve with decreasing pressor  requirement.  +coffee ground emesis per OGT.  Worsening renal function.      Objective:     Vital Signs (Most Recent):  Temp: 97.5 °F (36.4 °C) (05/04/25 0701)  Pulse: 100 (05/04/25 0937)  Resp: (!) 27 (05/04/25 0937)  BP: 101/62 (05/04/25 0937)  SpO2: (!) 93 % (05/04/25 0937) Vital Signs (24h Range):  Temp:  [95.9 °F (35.5 °C)-98.6 °F (37 °C)] 97.5 °F (36.4 °C)  Pulse:  [] 100  Resp:  [7-52] 27  SpO2:  [79 %-100 %] 93 %  BP: ()/() 101/62     Weight: 59.9 kg (132 lb 0.9 oz)  Body mass index is 20.68 kg/m².      Intake/Output Summary (Last 24 hours) at 5/4/2025 0958  Last data filed at 5/4/2025 0800  Gross per 24 hour   Intake 2165.32 ml   Output 900 ml   Net 1265.32 ml        Physical Exam  Vitals and nursing note reviewed.   Constitutional:       Appearance: He is ill-appearing.   Cardiovascular:      Rate and Rhythm: Regular rhythm. Tachycardia present.   Pulmonary:      Comments: Right axillary chest tube in place.    Abdominal:      General: Bowel sounds are normal.      Palpations: Abdomen is soft.      Tenderness: There is no abdominal tenderness.   Genitourinary:     Comments: camejo  Musculoskeletal:      Right lower leg: No edema.      Left lower leg: No edema.   Skin:     General: Skin is warm and dry.   Neurological:      Mental Status: Mental status is at baseline.      Comments: Arousable but confuse.             Review of Systems    Vents:  Oxygen Concentration (%): 50 (05/04/25 0937)    Lines/Drains/Airways       Peripherally Inserted Central Catheter Line  Duration             PICC Triple Lumen 05/04/25 0345 left basilic <1 day              Drain  Duration                  Urethral Catheter 04/28/25 2230 Coude 14 Fr. 5 days         Chest Tube 05/04/25 0010 Tube - 1 Right 16 Fr. <1 day         NG/OG Tube 05/03/25 1842 16 Fr. Left nostril <1 day              Peripheral Intravenous Line  Duration                  Peripheral IV - Single Lumen 04/30/25 1400 22 G Anterior;Distal;Right  "Upper Arm 3 days                    Significant Labs:    CBC/Anemia Profile:  Recent Labs   Lab 05/03/25  0351 05/04/25  0349   WBC 5.66 4.38   HGB 11.8* 12.6*   HCT 37.2* 40.5    234   MCV 85 87   RDW 14.6* 15.0*        Chemistries:  Recent Labs   Lab 05/03/25  0351 05/04/25  0349   * 146*   K 4.2 3.2*   * 109   CO2 21* 23   BUN 39* 52*   CREATININE 1.0 1.6*   CALCIUM 9.3 8.8   MG  --  2.2   PHOS  --  3.4     BNP  Recent Labs   Lab 04/29/25  1206   *         ABGs:   Recent Labs   Lab 05/04/25  0646   PH 7.355   PCO2 40.6   HCO3 22.6*   POCSATURATED 97   BE -3*       Blood Culture: No results for input(s): "LABBLOO" in the last 48 hours.  Respiratory Culture: No results for input(s): "GSRESP", "RESPIRATORYC" in the last 48 hours.  Echo 4/3/25       Left Ventricle: The left ventricle is normal in size. Normal wall thickness. Mild global hypokinesis present. There is mildly reduced systolic function with a visually estimated ejection fraction of 45 - 50%.    Right Ventricle: The right ventricle is normal in size. Systolic function is normal.    Aorta: Aortic root is mildly dilated measuring 3.82 cm.    Similar findings noted on prior report dated 3/17/25.    Significant Imaging:   I have reviewed all pertinent imaging results/findings within the past 24 hours.  CT: I have reviewed all pertinent results/findings within the past 24 hours and my personal findings are:  4/28/25 no overt ggo.  +patchy septal reticulation and honeycombing predominantly at bases.  This is similar to findings on 3/30/25 CT but has worsen when compared to CTA 7/7/23  CXR: I have reviewed all pertinent results/findings within the past 24 hours and my personal findings are:  5/4/25 resolution of right pneumothorax.      ABG  Recent Labs   Lab 05/04/25  0646   PH 7.355   PO2 96   PCO2 40.6   HCO3 22.6*   BE -3*     Assessment/Plan:     Neuro  Delirium  Sleepy during the day and agitated at night.  Unable to tolerate oral " seroquel, zyprexa iv.    Ngt placed.  Will start seroquel.    Agitation worse around late afternoon and evening.  I suspect a component of sundowning compounded by rib pain.  May need iv haldol +/- ketamine if agitation is uncontrol.       Acute metabolic encephalopathy  I suspect a component of baseline dementia + pain meds   Abg without co2 retention.    More alert today    Pulmonary  Traumatic pneumothorax  Tension pneumo on 5/3  S/p 14 Burundian with resolution of pneumo  Continue chest tube to suction for now    Acute on chronic respiratory failure with hypoxia  Hypoxemic requiring high flow nasal canula  Suspect a combination of baseline IPF + atelectatic changes a/w rib fracture +/- volume overload  Will monitor for aspiration given mental status.  Tension pneumothorax on 5/3 s/p 14 Bahraini chest tube  Continue with chest tube to suction for now.        IPF (idiopathic pulmonary fibrosis)  Followed by Dr. Schmitt as an outpatient  Do not have baseline pft due to poor cooperation  Home oxygen  Was on ofev and cellcept prior to hospice.    Repeat ct chest without acute changes.    Resume ofev and cellcept once tolerating po intake.   Solumedrol iv started.  Low suspicion for IPF exacerbation.  Will stop solumedrol given hyperglycemia and concern about steroid worsening delerium    Cardiac/Vascular  Shock  This patient has shock. The type of shock is cardiogenic + tension. The patient has the following evidence of shock: persistent hypotension. The patient will be admitted to an intensive care unit, they will be treated with levophed.  Wean as tolerated.  .    Chronic diastolic congestive heart failure  EF 40%  Difficulty to appreciate pulmonary edema on cxr given baseline pft.  Clinical exam without overt evidence of volume overload  Bnp 400.  Lasix 40 mg iv x 1 given.  Lasix held given bump in creatinine and improved oxygenation.        Renal/  EKATERINA (acute kidney injury)  EKATERINA is likely due to acute tubular necrosis  caused by hypotension. Baseline creatinine is 1. Most recent creatinine and eGFR are listed below.  Recent Labs     05/02/25  0348 05/03/25  0351 05/04/25  0349   CREATININE 0.9 1.0 1.6*   EGFRNORACEVR >60 >60 45*      Plan  - EKATERINA is due to atn a/w hypotension  - Avoid nephrotoxins and renally dose meds for GFR listed above  - Monitor urine output, serial BMP, and adjust therapy as needed  - ivf    Endocrine  Type 2 diabetes mellitus without complication, without long-term current use of insulin  5/1 Hyperglycemia with  with anion gap of 19.  Beta hydroxybutyrate elevated  Lantus increase by Dr. Parr  Iss    GI  Gastrointestinal hemorrhage associated with acute gastritis  Patient was noted to have coffee ground emesis per ngt.  H/h stable.    Recent Labs     05/02/25 0348 05/03/25  0351 05/04/25  0349   HGB 12.0* 11.8* 12.6*   HCT 37.9* 37.2* 40.5    191 234     Plan  - Will trend hemoglobin/hematocrit Every 8 hours  - Will monitor and correct any coagulation defects  - Will transfuse if Hgb is <7g/dl (<8g/dl in cases of active ACS) or if patient has rapid bleeding leading to hemodynamic instability  - ppi started.      Orthopedic  * Rib fractures  Pain control with toradol and prn morphine  Regional block per anesthesiology.  Difficult assessing pain given ams.  However, patient is calm and sleepy this am.  Toradol restarted.    Ngt placed this am.    Nsaid held due to gib    Palliative Care  Advance care planning  4/29/25 Given progression of IPF, patient was discharged to home hospice.  Per ANABELA Glaser, patient activated 911 after fall.  Currently full code.  On going goc discussion with son and palliative care.    4/30/25 spoken with son, Dr. Grant, and he agreed to DNR.  He is also agreeable to home hospice at the time of discharge.  Unclear if patient will still be living in [place] after discharge.   is working to explore various options available.    5/2/25 most  likely nursing  home upon discharge.    5/3/25 Dr. Ta's note appreciated.  Continue with current POC  5/4/25 spoken with Dr. Grant.  All questions answered.  He is aware of worsening of patient's condition given rojas, gib and tension pneumo.  He will continue his conversation with family to come up with consensus regarding goc.  At this time, continue with current poc.         Critical Care Daily Checklist:    A: Awake: RASS Goal/Actual Goal:    Actual:     B: Spontaneous Breathing Trial Performed?     C: SAT & SBT Coordinated?  N/a                      D: Delirium: CAM-ICU     E: Early Mobility Performed? No   F: Feeding Goal: Goals: Patient to achieve TF goal rate of 55 mL/hr as tolerated prior to RD follow up.  Status: Nutrition Goal Status: new   Current Diet Order   No orders of the defined types were placed in this encounter.      AS: Analgesia/Sedation Morphine, precedex   T: Thromboembolic Prophylaxis scd   H: HOB > 300 Yes   U: Stress Ulcer Prophylaxis (if needed) ppi   G: Glucose Control iss   B: Bowel Function Stool Occurrence: 0   I: Indwelling Catheter (Lines & Camejo) Necessity camejo   D: De-escalation of Antimicrobials/Pharmacotherapies N/a    Plan for the day/ETD Ppi, serial h/h    Code Status:  Family/Goals of Care: DNR       Critical Care Time: 85 minutes  Critical secondary to Patient has a condition that poses threat to life and bodily function: Acute Renal Failure and tension pneumo, shock      Critical care was time spent personally by me on the following activities: development of treatment plan with patient or surrogate and bedside caregivers, discussions with consultants, evaluation of patient's response to treatment, examination of patient, ordering and performing treatments and interventions, ordering and review of laboratory studies, ordering and review of radiographic studies, pulse oximetry, re-evaluation of patient's condition. This critical care time did not overlap with that of any other provider  or involve time for any procedures.     Pino Urena MD  Critical Care Medicine  [facility] [place] - Intensive Care

## 2025-05-04 NOTE — ASSESSMENT & PLAN NOTE
- noted 5/3/25 after requiring BiPAP for respiratory support  - chest tube placed on 5/3/25 to suction  - CXR now shows reexpanded lung  - chest tube remains in place, Pulmonary managing

## 2025-05-04 NOTE — ASSESSMENT & PLAN NOTE
Patient's most recent potassium results are listed below.   Recent Labs     05/02/25  0348 05/03/25  0351 05/04/25  0349   K 3.5 4.2 3.2*     Plan  - Replete potassium per protocol  - Monitor potassium Daily  - Patient's hypokalemia is worsening. Will continue current treatment- electrolyte replacement protocol ordered

## 2025-05-04 NOTE — ASSESSMENT & PLAN NOTE
Patient admitted with Hypoxic which is Acute on Chronic.  he is on home oxygen at 3 LPM. Signs/symptoms of respiratory failure include- tachypnea and increased work of breathing present on admission.   - Labs and images were reviewed. Patient Has recent ABG, which has been reviewed..   - Supplemental oxygen was provided and noted- 3L NC  - Respiratory failure is due to- Aspiration, Interstitial lung disease, and rib fractures, pneumothorax   - rib fractures- see rib fractures  - tension pneumothorax  - ILD: continue home Rx if he can tolerate but hold mycophenolate with concerns for infection. Continue inhalers  - fever noted, concern for aspiration- on antibiotics   - has HFpEF but currently appears eu/hypovolemic- hold lasix  - Pulmonary following

## 2025-05-04 NOTE — ASSESSMENT & PLAN NOTE
4/29/25 Given progression of IPF, patient was discharged to home hospice.  Per NP Alfredito, patient activated 911 after fall.  Currently full code.  On going goc discussion with son and palliative care.    4/30/25 spoken with son, Dr. Grant, and he agreed to DNR.  He is also agreeable to home hospice at the time of discharge.  Unclear if patient will still be living in [place] after discharge.   is working to explore various options available.    5/2/25 most  likely nursing home upon discharge.    5/3/25 Dr. Ta's note appreciated.  Continue with current POC  5/4/25 spoken with Dr. Grant.  All questions answered.  He is aware of worsening of patient's condition given rojas, gib and tension pneumo.  He will continue his conversation with family to come up with consensus regarding goc.  At this time, continue with current poc.

## 2025-05-04 NOTE — ASSESSMENT & PLAN NOTE
This patient has shock. The type of shock is obstructive due to tension pneumothorax. The patient has the following evidence of shock: persistent hypotension, EKATERINA, altered mental status, and hypoxia.   - s/p chest tube placement 5/3/25 for tension pneumothorax  - repeat infectious workup with concerns for aspiration- vomited into BiPAP on 5/3/25. Continues on antibiotics  - continues on levophed

## 2025-05-05 PROBLEM — E87.6 HYPOKALEMIA: Status: RESOLVED | Noted: 2024-01-01 | Resolved: 2025-01-01

## 2025-05-05 PROBLEM — S27.0XXA TRAUMATIC PNEUMOTHORAX: Status: RESOLVED | Noted: 2025-01-01 | Resolved: 2025-01-01

## 2025-05-05 PROBLEM — K29.01 GASTROINTESTINAL HEMORRHAGE ASSOCIATED WITH ACUTE GASTRITIS: Status: RESOLVED | Noted: 2025-01-01 | Resolved: 2025-01-01

## 2025-05-05 NOTE — PLAN OF CARE
Pt remains in ICU, Sinus tachycardic on the monitor, in 20L/40% vapotherm saturating>96%.Gtt. Hydromorphone infusing at 1mcg/hr. Q2 hrly turned. Antonio intact with fair urine output. Chestube in place with no output. NG tube connected to LIMS. POC explained to family at bedside verbalizes understanding. No falls, injuires and skin breakdown during this shift.   Problem: Adult Inpatient Plan of Care  Goal: Absence of Hospital-Acquired Illness or Injury  Outcome: Progressing  Goal: Optimal Comfort and Wellbeing  Outcome: Progressing     Problem: Infection  Goal: Absence of Infection Signs and Symptoms  Outcome: Progressing     Problem: Diabetes Comorbidity  Goal: Blood Glucose Level Within Targeted Range  Outcome: Progressing     Problem: Skin Injury Risk Increased  Goal: Skin Health and Integrity  Outcome: Progressing     Problem: Coping Ineffective  Goal: Effective Coping  Outcome: Progressing     Problem: Fall Injury Risk  Goal: Absence of Fall and Fall-Related Injury  Outcome: Progressing     Problem: Pain Acute  Goal: Optimal Pain Control and Function  Outcome: Progressing     Problem: Delirium  Goal: Optimal Coping  Outcome: Progressing  Goal: Improved Behavioral Control  Outcome: Progressing  Goal: Improved Attention and Thought Clarity  Outcome: Progressing  Goal: Improved Sleep  Outcome: Progressing     Problem: Acute Kidney Injury/Impairment  Goal: Fluid and Electrolyte Balance  Outcome: Progressing  Goal: Improved Oral Intake  Outcome: Progressing  Goal: Effective Renal Function  Outcome: Progressing

## 2025-05-05 NOTE — ASSESSMENT & PLAN NOTE
- pt with multiple related admissions; typically associated with encephalopathy on initial admission   - pt has been under the care of hospice for this but has been very resistant to symptom management with opioids to avoid severe exacerbations   - continued decline of respiratory status over the weekend; pt now requiring vapotherm, did not tolerate Bipap; pt not with chest tube s/p tension pneumothorax, adding to pt's poor prognosis

## 2025-05-05 NOTE — SUBJECTIVE & OBJECTIVE
Interval History: Agonal breathing this AM. Dr Waldrop has had goals of care discussions with family.     Review of Systems   Unable to perform ROS: Mental status change     Objective:     Vital Signs (Most Recent):  Temp: 99.7 °F (37.6 °C) (Isac hugger turned off) (05/05/25 0705)  Pulse: 84 (05/05/25 1105)  Resp: (!) 24 (05/05/25 1105)  BP: (!) 93/57 (05/05/25 1105)  SpO2: 97 % (05/05/25 1105) Vital Signs (24h Range):  Temp:  [96.3 °F (35.7 °C)-100.2 °F (37.9 °C)] 99.7 °F (37.6 °C)  Pulse:  [] 84  Resp:  [24-36] 24  SpO2:  [92 %-100 %] 97 %  BP: ()/(50-77) 93/57     Weight: 59.9 kg (132 lb 0.9 oz)  Body mass index is 20.68 kg/m².    Intake/Output Summary (Last 24 hours) at 5/5/2025 1134  Last data filed at 5/5/2025 1105  Gross per 24 hour   Intake 3220.3 ml   Output 1660 ml   Net 1560.3 ml         Physical Exam  Vitals and nursing note reviewed.   Constitutional:       General: He is in acute distress.      Appearance: He is ill-appearing and toxic-appearing.   HENT:      Head:      Comments: Temporal wasting       Nose:      Comments: NGT in place with brown output     Mouth/Throat:      Mouth: Mucous membranes are dry.   Cardiovascular:      Rate and Rhythm: Normal rate and regular rhythm.   Pulmonary:      Breath sounds: Rhonchi and rales present.      Comments: Increased effort. On vapotherm 20/40. Chest tube on R. Agonal breathing pattern  Abdominal:      General: Bowel sounds are normal.      Palpations: Abdomen is soft.      Tenderness: There is no abdominal tenderness.   Genitourinary:     Comments: camejo  Musculoskeletal:      Right lower leg: No edema.      Left lower leg: No edema.   Skin:     General: Skin is warm and dry.   Neurological:      Mental Status: He is disoriented.               Significant Labs: All pertinent labs within the past 24 hours have been reviewed.    Significant Imaging: I have reviewed all pertinent imaging results/findings within the past 24 hours.

## 2025-05-05 NOTE — ASSESSMENT & PLAN NOTE
Lab Results   Component Value Date    HGBA1C 7.9 (H) 05/01/2025     - lantus 15QD  - glucose checks with low dose SSI  - hold home oral DM meds  - had tried to start TF on 5/3/25, but he vomited into BiPAP and then had coffee grounds in NGT-- hold tube feeds, NGT to XIAO

## 2025-05-05 NOTE — ASSESSMENT & PLAN NOTE
Hypoxemic requiring high flow nasal canula  Suspect a combination of baseline IPF + atelectatic changes a/w rib fracture +/- volume overload  Will monitor for aspiration given mental status.  Tension pneumothorax on 5/3 s/p 14 French chest tube  Continue with chest tube to water seal without plans to remove  Dealing with end of life  IV dilaudid started and will titrate as needed.

## 2025-05-05 NOTE — ASSESSMENT & PLAN NOTE
EKATERINA is likely due to shock from tension pneumothorax; developed on 5/4/25. Baseline creatinine is 0.9. Most recent creatinine and eGFR are listed below.  Recent Labs     05/04/25  0349 05/04/25 2016 05/05/25  0315   CREATININE 1.6* 1.3 1.3   EGFRNORACEVR 45* 58* 58*      Plan  - EKATERINA is improving  - Avoid nephrotoxins and renally dose meds for GFR listed above  - Monitor urine output, serial BMP, and adjust therapy as needed  - continue  IVF  - holding all NSAIDs

## 2025-05-05 NOTE — ASSESSMENT & PLAN NOTE
Followed by Dr. Schmitt as an outpatient  Unable to perform PFTs 2/2 severity of his disease  Home oxygen  Was on ofev and cellcept prior to hospice without clinical response.    Repeat ct chest without acute changes.    Low suspicion for IPF exacerbation.    Has end stage disease at baseline

## 2025-05-05 NOTE — ASSESSMENT & PLAN NOTE
5/5/2025  - interval chart reviewed; pt discussed with MDT during ICU rounds   - attempted to call pt's son, Emmanuel Rojas, unable to reach (surgeon and back at work); met with son, Félix, and Félix's mother at bedside following their discussion with Dr. Waldrop, [facility]   - family plans to have discussion regarding GOC and potential readiness for transition to comfort focused care, with all 3 sons this evening   - Félix and his mother shared their views on pt's decline and insight into pt's likely poor prognosis; they are hopeful to provide pt with some dignity in what time he does   - reviewed pt's spiritual/Druze beliefs, as a Faith; family confirms pastoral care has been seeing pt and that pt did receive last rights/anointing over the weekend   - family shared insight that their original plans to [place] out of state, is no longer possible and GOC discussion is planned to be focused on deciding wether to allow more time for optimization/assessing pt's progress with continued treatment or transition to comfort focused care with withdrawing current aggressive treatments   - emotional support provided; answered all questions; expressed continued availability of palliative team   - updated MDT  Please see prior palliative/ACP notes and consult through out this and prior admission for ongoing GOC/ACP discussions.

## 2025-05-05 NOTE — ASSESSMENT & PLAN NOTE
EKATERINA is likely due to acute tubular necrosis caused by hypotension. Baseline creatinine is 1. Most recent creatinine and eGFR are listed below.  Recent Labs     05/04/25  0349 05/04/25 2016 05/05/25  0315   CREATININE 1.6* 1.3 1.3   EGFRNORACEVR 45* 58* 58*        Plan  - EKATERINA is due to atn a/w hypotension  - Avoid nephrotoxins and renally dose meds for GFR listed above  - Monitor urine output, serial BMP, and adjust therapy as needed

## 2025-05-05 NOTE — ASSESSMENT & PLAN NOTE
4/29/25 Given progression of IPF, patient was discharged to home hospice.  Per NP Alfredito, patient activated 911 after fall.  Currently full code.  On going goc discussion with son and palliative care.    4/30/25 spoken with son, Dr. Grant, and he agreed to DNR.  He is also agreeable to home hospice at the time of discharge.  Unclear if patient will still be living in [place] after discharge.   is working to explore various options available.    5/2/25 most  likely nursing home upon discharge.    5/3/25 Dr. Ta's note appreciated.  Continue with current POC  5/4/25 spoken with Dr. Grant.  All questions answered.  He is aware of worsening of patient's condition given rojas, gib and tension pneumo.  He will continue his conversation with family to come up with consensus regarding goc.  At this time, continue with current poc.    5/5 spoke with youngest son and ex wife. Discussed we are dealing with end of life disease. Discussed optimizing his medications for comfort

## 2025-05-05 NOTE — EICU
Intervention Initiated From:  COR / TERRIEU    Edison intervened regarding:  Rounding (Video assessment)    VICU Night Rounds Checklist  24H Vital Sign Range:  Temp:  [95.9 °F (35.5 °C)-98.6 °F (37 °C)]   Pulse:  []   Resp:  [7-52]   BP: ()/(21-94)   SpO2:  [79 %-100 %]     Video rounds and LDA reconciliation

## 2025-05-05 NOTE — EICU
Virtual ICU Quality Rounds    Admit Date: 4/27/2025  Hospital Day: 7    ICU Day: 6d 5h    24H Vital Sign Range:  Temp:  [96.3 °F (35.7 °C)-100.2 °F (37.9 °C)]   Pulse:  []   Resp:  [25-36]   BP: ()/(50-77)   SpO2:  [92 %-100 %]     VICU Surveillance Screening    Daily review of  line necessity(optional): Central line(s) in place and Urinary catheter in place    Central line type (optional): PICC    Central line indications : Multiple infusions    Antonio Indications : Critically ill in the intensive care unit requiring hourly monitoring     LDA reconciliation : Yes

## 2025-05-05 NOTE — ASSESSMENT & PLAN NOTE
Anemia is likely due to chronic blood loss. Most recent hemoglobin and hematocrit are listed below.  Recent Labs     05/05/25  0009 05/05/25  0315 05/05/25  0811   HGB 10.5* 10.3* 10.4*   HCT 33.7* 33.4* 33.4*     Plan  - Monitor serial CBC: Daily  - Transfuse PRBC if patient becomes hemodynamically unstable, symptomatic or H/H drops below 7/21.  - Patient has not received any PRBC transfusions to date  - Patient's anemia is currently stable   - he had coffee grounds in NGT but Hgb is stable    - holding DVT ppx for this reason   - on PPI IV BID (confirmed no allergy)

## 2025-05-05 NOTE — ASSESSMENT & PLAN NOTE
EF 40%  Difficulty to appreciate pulmonary edema on cxr given baseline pft.  Clinical exam without overt evidence of volume overload  Bnp 400.  Hold further lasix given Cr elevation

## 2025-05-05 NOTE — CLINICAL REVIEW
IP Sepsis Screen (most recent)       Sepsis Screen (IP) - 05/05/25 1006       Is the patient's history or complaint suggestive of a possible infection? Yes  -WL    Are there at least two of the following signs and symptoms present? Yes  -WL    Sepsis signs/symptoms - Tachycardia Tachycardia     >90  -WL    Sepsis signs/symptoms - Tachypnea Tachypnea     >20  -WL    Are any of the following organ dysfunction criteria present and not considered to be due to a chronic condition? Yes  -WL    Organ Dysfunction Criteria SBP < 90 or MAP < 65  -WL    Initiate Sepsis Protocol No  -WL    Reason sepsis not considered Pt. receiving appropriate management   repeat BCx2, LA wnl, on abx -WL              User Key  (r) = Recorded By, (t) = Taken By, (c) = Cosigned By      Initials Name    Hollie Osorio RN

## 2025-05-05 NOTE — ASSESSMENT & PLAN NOTE
Pain control with toradol and prn morphine  Regional block per anesthesiology.  Difficult assessing pain given ams.  However, patient is calm and sleepy this am.  Toradol restarted.    Ngt in place  Nsaid held due to gib

## 2025-05-05 NOTE — ASSESSMENT & PLAN NOTE
Patient admitted with Hypoxic which is Acute on Chronic.  he is on home oxygen at 3 LPM. Signs/symptoms of respiratory failure include- tachypnea and increased work of breathing present on admission.   - Labs and images were reviewed. Patient Has recent ABG, which has been reviewed..   - Supplemental oxygen was provided and noted- vapotherm  - Respiratory failure is due to- Aspiration, Interstitial lung disease, and rib fractures, pneumothorax   - rib fractures- see rib fractures  - tension pneumothorax- see that problem  - ILD: continue home Rx if he can tolerate but hold mycophenolate with concerns for infection. Continue inhalers  - fever noted, concern for aspiration- on antibiotics   - has HFpEF but currently appears eu/hypovolemic- hold lasix  - Pulmonary following

## 2025-05-05 NOTE — NURSING
[facility] [place]  Nurses Note -- 4 Eyes      5/5/2025       Skin assessed on: Q Shift      [x] No Pressure Injuries Present    [x]Prevention Measures Documented    [] Yes LDA  for Pressure Injury Previously documented     [] Yes New Pressure Injury Discovered   [] LDA for New Pressure Injury Added      Attending RN:  Janis Marcos RN     Second RN:

## 2025-05-05 NOTE — PROGRESS NOTES
[facility] Medicine  Progress Note    Patient Name: Emmanuel Grant  MRN: [id number]  Patient Class: IP- Inpatient   Admission Date: 4/27/2025  Length of Stay: 7 days  Attending Physician: Nargis Parr MD  Primary Care Provider: Wilfredo De Souza MD        Subjective     Principal Problem:Rib fractures        HPI:  Emmanuel Grant is a 73 y/o male with PMHx of HTN, DM2, CVA, CHF, and pulmonary fibrosis with chronic respiratory failure on home oxygen and hospice who presented to ED with right shoulder and rib pain after a fall early this morning.  He states he got up to use the bathroom and he lost his balance.  He landed on his right side and immediately had pain.  He got himself into a chair and called EMS.  No head injury or LOC.  In the ED, he was found to have multiple right-sided rib fractures, CT chest demonstrates multiple nondisplaced and minimally displaced rib fractures are identified including fractures of the 3rd, 4th, 5th, 6th, 7th, and 8th ribs laterally and the 5th rib posteriorly. No pneumothorax and oxygen saturations are 97% on his regular home oxygen of 3L via NC.  He is being admitted for pain control.    Overview/Hospital Course:  Mr Emmanuel Grant is a 74 y.o. man with pulmonary fibrosis on 3L home O2, frequent falls at home, on home hospice who fell, called EMS, and was admitted with R rib fractures 3-8. Started pain control. However, he worsened, becoming febrile, confused, and more hypoxic. He was moved to ICU on 4/29, started antibiotics. Pulmonary, Palliative consulted. His respiratory status has improved. Anesthesia performed BETTY block on 4/30. He became alert on 5/2/25 but still delirious, especially in the afternoon/evening on 5/2 when he became hypertensive, tachycardic, tachypneic, and screaming out in pain despite zyprexa, ativan, fentanyl, morphine, and his family at bedside trying to comfort him. [place]iscuss[place] with [place] [facility] [place]- all  they would offer would be another block. Discussed with family- not yet ready for comfort focused care. He has not had sufficient nutrition in a week, and he cannot keep oral medications down which makes multimodal pain control difficult. Placed NGT placement for feeds and multimodal pain control. However, he worsened becoming tachycardic, hypotensive, hypoxic on nonrebreather. He was placed on BiPAP. He then developed tension pneumothorax. Chest tube was placed on 5/4 to suction. He has had coffee ground NGT output and EKATERINA. Goals of care discussions are ongoing.     Interval History: Agonal breathing this AM. Dr Waldrop has had goals of care discussions with family.     Review of Systems   Unable to perform ROS: Mental status change     Objective:     Vital Signs (Most Recent):  Temp: 99.7 °F (37.6 °C) (Isac hugger turned off) (05/05/25 0705)  Pulse: 84 (05/05/25 1105)  Resp: (!) 24 (05/05/25 1105)  BP: (!) 93/57 (05/05/25 1105)  SpO2: 97 % (05/05/25 1105) Vital Signs (24h Range):  Temp:  [96.3 °F (35.7 °C)-100.2 °F (37.9 °C)] 99.7 °F (37.6 °C)  Pulse:  [] 84  Resp:  [24-36] 24  SpO2:  [92 %-100 %] 97 %  BP: ()/(50-77) 93/57     Weight: 59.9 kg (132 lb 0.9 oz)  Body mass index is 20.68 kg/m².    Intake/Output Summary (Last 24 hours) at 5/5/2025 1134  Last data filed at 5/5/2025 1105  Gross per 24 hour   Intake 3220.3 ml   Output 1660 ml   Net 1560.3 ml         Physical Exam  Vitals and nursing note reviewed.   Constitutional:       General: He is in acute distress.      Appearance: He is ill-appearing and toxic-appearing.   HENT:      Head:      Comments: Temporal wasting       Nose:      Comments: NGT in place with brown output     Mouth/Throat:      Mouth: Mucous membranes are dry.   Cardiovascular:      Rate and Rhythm: Normal rate and regular rhythm.   Pulmonary:      Breath sounds: Rhonchi and rales present.      Comments: Increased effort. On vapotherm 20/40. Chest tube on R. Agonal breathing  pattern  Abdominal:      General: Bowel sounds are normal.      Palpations: Abdomen is soft.      Tenderness: There is no abdominal tenderness.   Genitourinary:     Comments: camejo  Musculoskeletal:      Right lower leg: No edema.      Left lower leg: No edema.   Skin:     General: Skin is warm and dry.   Neurological:      Mental Status: He is disoriented.               Significant Labs: All pertinent labs within the past 24 hours have been reviewed.    Significant Imaging: I have reviewed all pertinent imaging results/findings within the past 24 hours.      Assessment & Plan  Rib fractures  - due to mechanical fall at home  - Anesthesia completed BETTY block on 4/30  - discussed with [place] [facility] [place] on [place]/2/25-- they would perform another block, but that has already been tried and was minimally effective   - NGT in place for multimodal including PO meds, but then with coffee grounds in NGT-- holding all PO Rx  - now on dilaudid gtt for pain control   - developed tension pneumothorax- see that problem  Chronic diastolic congestive heart failure  Patient has Diastolic (HFpEF) heart failure that is Chronic. On presentation their CHF was well compensated.     Echo 4/3/25    Left Ventricle: The left ventricle is normal in size. Normal wall thickness. Mild global hypokinesis present. There is mildly reduced systolic function with a visually estimated ejection fraction of 45 - 50%.    Right Ventricle: The right ventricle is normal in size. Systolic function is normal.    Aorta: Aortic root is mildly dilated measuring 3.82 cm.    Similar findings noted on prior report dated 3/17/25.    Current Heart Failure Medications  hydrALAZINE injection 10 mg, Every 6 hours PRN, Intravenous  NORepinephrine 4 mg in dextrose 5% 250 mL infusion (premix), Continuous, Intravenous    Plan  - Place on telemetry  - he is currently too sedated to take home Rx  - hold lasix          Type 2 diabetes mellitus without  complication, without long-term current use of insulin  Lab Results   Component Value Date    HGBA1C 7.9 (H) 05/01/2025     - lantus 15QD  - glucose checks with low dose SSI  - hold home oral DM meds  - had tried to start TF on 5/3/25, but he vomited into BiPAP and then had coffee grounds in NGT-- hold tube feeds, NGT to LIWS    Essential hypertension  Patient's blood pressure range in the last 24 hours was: BP  Min: 78/52  Max: 138/77.The patient's inpatient anti-hypertensive regimen is listed below:  Current Antihypertensives  hydrALAZINE injection 10 mg, Every 6 hours PRN, Intravenous    Plan  - all Rx off since he is now on levophed  Coronary artery disease involving 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 Continue to monitor on telemetry.   - note last angiogram 9/2024 with patient LAD stent   - currently unable to swallow asa, and holding this with coffee ground in NGT on 5/4  BPH (benign prostatic hyperplasia)  - unable to swallow flomax currently  - continues with camejo    H/O: CVA (cerebrovascular accident)  - noted     Immunosuppression due to drug therapy  - for IPF- see IPF    Anemia, chronic disease  Anemia is likely due to chronic blood loss. Most recent hemoglobin and hematocrit are listed below.  Recent Labs     05/05/25  0009 05/05/25  0315 05/05/25  0811   HGB 10.5* 10.3* 10.4*   HCT 33.7* 33.4* 33.4*     Plan  - Monitor serial CBC: Daily  - Transfuse PRBC if patient becomes hemodynamically unstable, symptomatic or H/H drops below 7/21.  - Patient has not received any PRBC transfusions to date  - Patient's anemia is currently stable   - he had coffee grounds in NGT but Hgb is stable    - holding DVT ppx for this reason   - on PPI IV BID (confirmed no allergy)    IPF (idiopathic pulmonary fibrosis)  - appeared at baseline upon admission  - on 3L home O2  -  holding home nintedanib as he cannot swallow it  - holding mycophenolate with concerns for infection   Acute on chronic respiratory failure with hypoxia  Patient admitted with Hypoxic which is Acute on Chronic.  he is on home oxygen at 3 LPM. Signs/symptoms of respiratory failure include- tachypnea and increased work of breathing present on admission.   - Labs and images were reviewed. Patient Has recent ABG, which has been reviewed..   - Supplemental oxygen was provided and noted- vapotherm  - Respiratory failure is due to- Aspiration, Interstitial lung disease, and rib fractures, pneumothorax   - rib fractures- see rib fractures  - tension pneumothorax- see that problem  - ILD: continue home Rx if he can tolerate but hold mycophenolate with concerns for infection. Continue inhalers  - fever noted, concern for aspiration- on antibiotics   - has HFpEF but currently appears eu/hypovolemic- hold lasix  - Pulmonary following    Advance care planning  Advance Care Planning     Date: 04/29/2025  - Palliative consulted and discussed with family  - note he is on home hospice  - currently continue care but with limitation- no chest compressions, ok for intubation if needed  - family is flying in town  - time spent discussing with palliative and reviewing documentation= 5 minutes     Advance Care Planning     Date: 04/30/2025  - palliative met with family again today  - DNR/DNI code status  - time spent reviewing= 5 minutes   Acute metabolic encephalopathy  - worsening on 4/28-29  - suspect due to sedative medications given for pain control  - he needs pain/anxiety control but will try to limit ativan use  - unfortunately with concerns for GIB, he cannot have PO Rx now     Delirium  - see AMS    Hypokalemia (Resolved: 5/5/2025)  Patient's most recent potassium results are listed below.   Recent Labs     05/04/25  0349 05/04/25 2016 05/05/25  0315   K 3.2* 5.3* 5.2*     Plan  - Replete potassium per protocol  - Monitor  potassium Daily  - Patient's hypokalemia is worsening. Will continue current treatment- electrolyte replacement protocol ordered  Tension pneumothorax  - noted 5/3/25 after requiring BiPAP for respiratory support  - chest tube placed on 5/3/25 to suction  - CXR now shows reexpanded lung  - chest tube remains in place, Pulmonary managing     EKATERINA (acute kidney injury)  EKATERINA is likely due to shock from tension pneumothorax; developed on 5/4/25. Baseline creatinine is 0.9. Most recent creatinine and eGFR are listed below.  Recent Labs     05/04/25  0349 05/04/25 2016 05/05/25  0315   CREATININE 1.6* 1.3 1.3   EGFRNORACEVR 45* 58* 58*      Plan  - EKATERINA is improving  - Avoid nephrotoxins and renally dose meds for GFR listed above  - Monitor urine output, serial BMP, and adjust therapy as needed  - continue  IVF  - holding all NSAIDs  Shock  This patient has shock. The type of shock is obstructive due to tension pneumothorax. The patient has the following evidence of shock: persistent hypotension, EKATERINA, altered mental status, and hypoxia.   - s/p chest tube placement 5/3/25 for tension pneumothorax  - repeat infectious workup with concerns for aspiration- vomited into BiPAP on 5/3/25. Continues on antibiotics  - continues on levophed with decreasing requirements   Traumatic pneumothorax (Resolved: 5/5/2025)      VTE Risk Mitigation (From admission, onward)           Ordered     Reason for No Pharmacological VTE Prophylaxis  Once        Question:  Reasons:  Answer:  Risk of Bleeding    04/27/25 1755     IP VTE HIGH RISK PATIENT  Once         04/27/25 1755     Place sequential compression device  Until discontinued         04/27/25 1755                    Discharge Planning   DERIC:      Code Status: DNR   Medical Readiness for Discharge Date:   Discharge Plan A: New Nursing Home placement - skilled nursing care facility            Critical care time spent on the evaluation and treatment of severe organ dysfunction, review of  pertinent labs and imaging studies, discussions with consulting providers and discussions with patient/family: 30 minutes.            Nargis Parr MD  Department of Hospital Medicine   [facility] - Intensive Care

## 2025-05-05 NOTE — ASSESSMENT & PLAN NOTE
This patient has shock. The type of shock is cardiogenic + tension. The patient has the following evidence of shock: persistent hypotension. The patient will be admitted to an intensive care unit, they will be treated with levophed.  Wean as tolerated.

## 2025-05-05 NOTE — ASSESSMENT & PLAN NOTE
Patient with known CAD s/p stent placement, which is controlled Will continue ASA and monitor for S/Sx of angina/ACS. Continue to monitor on telemetry.   - note last angiogram 9/2024 with patient LAD stent   - currently unable to swallow asa, and holding this with coffee ground in NGT on 5/4

## 2025-05-05 NOTE — SUBJECTIVE & OBJECTIVE
Interval History: No more severe distress. On Precedex drip and agonal respirations noted. Not arousable to stimulation. Occasional spontaneous groaning. Discussed in detail with patients youngest son who is communicating with his older brothers. His case is unfortunately a result of progression of terminal lung disease and all outpatient treatments have been exhausted. He is showing some of respiratory cachexia and we discussed how this further limits his respiratory reserve. Discussed adjusting his medictions for comfort from a combination of narcotics and Precedex to narcotics alone as these are more affective at controlling dyspnea.    Remains on levophed.  Chest tube in place with no air leak    Discussed case in detail with hospitalist.       Objective:     Vital Signs (Most Recent):  Temp: 99.7 °F (37.6 °C) (Isac hugger turned off) (05/05/25 0705)  Pulse: 84 (05/05/25 1105)  Resp: (!) 24 (05/05/25 1105)  BP: (!) 93/57 (05/05/25 1105)  SpO2: 97 % (05/05/25 1105) Vital Signs (24h Range):  Temp:  [96.3 °F (35.7 °C)-100.2 °F (37.9 °C)] 99.7 °F (37.6 °C)  Pulse:  [] 84  Resp:  [24-36] 24  SpO2:  [92 %-100 %] 97 %  BP: ()/(50-77) 93/57     Weight: 59.9 kg (132 lb 0.9 oz)  Body mass index is 20.68 kg/m².      Intake/Output Summary (Last 24 hours) at 5/5/2025 1424  Last data filed at 5/5/2025 1105  Gross per 24 hour   Intake 2702.02 ml   Output 1660 ml   Net 1042.02 ml        Physical Exam  Vitals reviewed.   Constitutional:       General: He is in acute distress.      Appearance: He is ill-appearing.   HENT:      Head: Normocephalic and atraumatic.      Mouth/Throat:      Mouth: Mucous membranes are dry.   Eyes:      Pupils: Pupils are equal, round, and reactive to light.   Cardiovascular:      Rate and Rhythm: Normal rate and regular rhythm.   Pulmonary:      Effort: Respiratory distress present.      Breath sounds: Rhonchi and rales present.      Comments: Chest tube without air leak. Good tidal  variation  Abdominal:      General: There is no distension.   Musculoskeletal:      Right lower leg: No edema.      Left lower leg: No edema.   Skin:     General: Skin is warm and dry.      Capillary Refill: Capillary refill takes less than 2 seconds.   Neurological:      Mental Status: He is disoriented.           Review of Systems    Vents:  Oxygen Concentration (%): 40 (05/05/25 1112)    Lines/Drains/Airways       Peripherally Inserted Central Catheter Line  Duration             PICC Triple Lumen 05/04/25 0345 left basilic 1 day              Drain  Duration                  Urethral Catheter 04/28/25 2230 Coude 14 Fr. 6 days         Chest Tube 05/04/25 0010 Tube - 1 Right 16 Fr. 1 day         NG/OG Tube 05/03/25 1842 16 Fr. Left nostril 1 day              Peripheral Intravenous Line  Duration                  Peripheral IV - Single Lumen 04/30/25 1400 22 G Anterior;Distal;Right Upper Arm 5 days                    Significant Labs:    CBC/Anemia Profile:  Recent Labs   Lab 05/04/25  0349 05/04/25  1635 05/05/25  0009 05/05/25  0315 05/05/25  0811   WBC 4.38  --   --  9.70  --    HGB 12.6*   < > 10.5* 10.3* 10.4*   HCT 40.5   < > 33.7* 33.4* 33.4*     --   --  166  --    MCV 87  --   --  86  --    RDW 15.0*  --   --  15.0*  --     < > = values in this interval not displayed.        Chemistries:  Recent Labs   Lab 05/04/25 0349 05/04/25  2016 05/05/25  0315   * 144 142   K 3.2* 5.3* 5.2*    113* 113*   CO2 23 22* 21*   BUN 52* 45* 44*   CREATININE 1.6* 1.3 1.3   CALCIUM 8.8 8.7 8.2*   ALBUMIN  --   --  1.9*   PROT  --   --  5.6*   BILITOT  --   --  0.5   ALKPHOS  --   --  49   ALT  --   --  25   AST  --   --  55*   MG 2.2  --  2.0   PHOS 3.4  --  3.9       ABGs:   Recent Labs   Lab 05/04/25  0646   PH 7.355   PCO2 40.6   HCO3 22.6*   POCSATURATED 97   BE -3*     All pertinent labs within the past 24 hours have been reviewed.    Significant Imaging:  I have reviewed all pertinent imaging  results/findings within the past 24 hours.  CXR: I have reviewed all pertinent results/findings within the past 24 hours and my personal findings are:  severe bilateral progressive ILD  Tely shows NSR

## 2025-05-05 NOTE — NURSING
[facility] [place]  Nurses Note -- 4 Eyes      5/5/2025       Skin assessed on: Q Shift      [x] No Pressure Injuries Present    [x]Prevention Measures Documented    [] Yes LDA  for Pressure Injury Previously documented     [] Yes New Pressure Injury Discovered   [] LDA for New Pressure Injury Added      Attending RN:  Phylicia Dallas RN     Second RN:  KANDI Bruce

## 2025-05-05 NOTE — ASSESSMENT & PLAN NOTE
Patient's most recent potassium results are listed below.   Recent Labs     05/04/25  0349 05/04/25 2016 05/05/25  0315   K 3.2* 5.3* 5.2*     Plan  - Replete potassium per protocol  - Monitor potassium Daily  - Patient's hypokalemia is worsening. Will continue current treatment- electrolyte replacement protocol ordered

## 2025-05-05 NOTE — PLAN OF CARE
Changes in medical condition or discharge plan:  Patient with significant decline.  Agonal breathing and constant moaning noted.    Does patient need new DME? tbd    Follow up appts needed: tbd    Medically stable: no    Estimated Discharge Date: unknown    CM met with son, Félix and patient's ex-wife at bedside.  Félix stated that he plans to speak with his brothers this evening about goals of care.   Félix to inform CM tomorrow of family's decision.       05/05/25 1318   Discharge Reassessment   Assessment Type Discharge Planning Reassessment   Did the patient's condition or plan change since previous assessment? Yes   Discharge Plan discussed with: Adult children   Communicated DERIC with patient/caregiver Date not available/Unable to determine   Discharge Plan A   (tbd)   Discharge Plan B   (TBD)   DME Needed Upon Discharge  none   Transition of Care Barriers Other (see comments)   Why the patient remains in the hospital Requires continued medical care

## 2025-05-05 NOTE — ASSESSMENT & PLAN NOTE
This patient has shock. The type of shock is obstructive due to tension pneumothorax. The patient has the following evidence of shock: persistent hypotension, EKATERINA, altered mental status, and hypoxia.   - s/p chest tube placement 5/3/25 for tension pneumothorax  - repeat infectious workup with concerns for aspiration- vomited into BiPAP on 5/3/25. Continues on antibiotics  - continues on levophed with decreasing requirements

## 2025-05-05 NOTE — SUBJECTIVE & OBJECTIVE
Medications:  Continuous Infusions:   dexmedeTOMIDine (Precedex) infusion (titrating)  0-1.4 mcg/kg/hr Intravenous Continuous 13.48 mL/hr at 05/05/25 1105 0.9 mcg/kg/hr at 05/05/25 1105    HYDROmorphone  0-4 mg/hr Intravenous Continuous 2.5 mL/hr at 05/05/25 1105 0.5 mg/hr at 05/05/25 1105    NORepinephrine bitartrate-D5W  0-3 mcg/kg/min Intravenous Continuous 2.2 mL/hr at 05/05/25 1105 0.01 mcg/kg/min at 05/05/25 1105     Scheduled Meds:   arformoteroL  15 mcg Nebulization BID    budesonide  1 mg Nebulization BID    insulin glargine U-100  15 Units Subcutaneous Daily    latanoprost  1 drop Both Eyes QHS    LIDOcaine  3 patch Transdermal Q24H    pantoprazole  40 mg Intravenous BID    vancomycin (VANCOCIN) IV (PEDS and ADULTS)  1,000 mg Intravenous Q24H     PRN Meds:  Current Facility-Administered Medications:     calcium gluconate IVPB, 1 g, Intravenous, PRN    calcium gluconate IVPB, 2 g, Intravenous, PRN    calcium gluconate IVPB, 3 g, Intravenous, PRN    dextrose 50%, 12.5 g, Intravenous, PRN    dextrose 50%, 25 g, Intravenous, PRN    diphenhydrAMINE, 25 mg, Oral, Q6H PRN    glucagon (human recombinant), 1 mg, Intramuscular, PRN    haloperidol lactate, 5 mg, Intravenous, Q6H PRN    hydrALAZINE, 10 mg, Intravenous, Q6H PRN    HYDROmorphone, 2 mg, Intravenous, Q2H PRN    insulin aspart U-100, 0-10 Units, Subcutaneous, Q6H PRN    lorazepam, 1 mg, Intravenous, Q4H PRN    magnesium sulfate 2 g IVPB, 2 g, Intravenous, PRN    magnesium sulfate 2 g IVPB, 2 g, Intravenous, PRN    naloxone, 0.02 mg, Intravenous, PRN    ondansetron, 4 mg, Intravenous, Q8H PRN    potassium chloride in water, 40 mEq, Intravenous, PRN **AND** potassium chloride in water, 60 mEq, Intravenous, PRN **AND** potassium chloride in water, 80 mEq, Intravenous, PRN    sodium chloride 0.9%, 10 mL, Intravenous, PRN    sodium phosphate 15 mmol in D5W 250 mL IVPB, 15 mmol, Intravenous, PRN    sodium phosphate 20.1 mmol in D5W 250 mL IVPB, 20.1 mmol,  Intravenous, PRN    sodium phosphate 30 mmol in D5W 250 mL IVPB, 30 mmol, Intravenous, PRN    Pharmacy to dose Vancomycin consult, , , Once **AND** vancomycin - pharmacy to dose, , Intravenous, pharmacy to manage frequency    Objective:     Vital Signs (Most Recent):  Temp: 99.7 °F (37.6 °C) (Isac hugger turned off) (05/05/25 0705)  Pulse: 84 (05/05/25 1105)  Resp: (!) 24 (05/05/25 1105)  BP: (!) 93/57 (05/05/25 1105)  SpO2: 97 % (05/05/25 1105) Vital Signs (24h Range):  Temp:  [96.3 °F (35.7 °C)-100.2 °F (37.9 °C)] 99.7 °F (37.6 °C)  Pulse:  [] 84  Resp:  [24-36] 24  SpO2:  [92 %-100 %] 97 %  BP: ()/(50-77) 93/57     Weight: 59.9 kg (132 lb 0.9 oz)  Body mass index is 20.68 kg/m².       Physical Exam  Vitals and nursing note reviewed.   Constitutional:       Appearance: He is cachectic. He is ill-appearing.      Interventions: He is sedated. Nasal cannula in place.   HENT:      Head: Atraumatic.      Comments: Temporal wasting   Pulmonary:      Effort: Pulmonary effort is normal. No respiratory distress.      Comments: Vapotherm; initial assessment pt with agonal breathing, improved work of breathing in subsequent visit after opioid admin   Musculoskeletal:      Comments: Muscular wasting    Neurological:      Mental Status: He is lethargic and disoriented.        Advance Care Planning   Advance Directives:   Living Will: No    LaPOST: No    Do Not Resuscitate Status: Yes    Medical Power of : No (Verbal declaration that son, Emmnauel Grant Jr.,  is preferred MPOA)      Decision Making:  Family answered questions  Goals of Care: What is most important right now is to focus on spending time at home, avoiding the hospital, remaining as independent as possible, symptom/pain control, quality of life, even if it means sacrificing a little time. Accordingly, we have decided that the best plan to meet the patient's goals includes continuing with treatment.     Significant Labs: All pertinent labs  within the past 24 hours have been reviewed.  CBC:   Recent Labs   Lab 05/05/25 0315 05/05/25  0811   WBC 9.70  --    HGB 10.3* 10.4*   HCT 33.4* 33.4*   MCV 86  --      --      BMP:  Recent Labs   Lab 05/05/25 0315   *      K 5.2*   *   CO2 21*   BUN 44*   CREATININE 1.3   CALCIUM 8.2*   MG 2.0     LFT:  Lab Results   Component Value Date    AST 55 (H) 05/05/2025    ALKPHOS 49 05/05/2025    BILITOT 0.5 05/05/2025     Albumin:   Albumin   Date Value Ref Range Status   05/05/2025 1.9 (L) 3.5 - 5.2 g/dL Final   03/17/2025 2.9 (L) 3.5 - 5.2 g/dL Final     Protein:   Protein Total   Date Value Ref Range Status   05/05/2025 5.6 (L) 6.0 - 8.4 gm/dL Final     Total Protein   Date Value Ref Range Status   03/17/2025 6.7 6.0 - 8.4 g/dL Final     Lactic acid:   Lab Results   Component Value Date    LACTATE 1.5 04/29/2025    LACTATE 1.8 04/03/2025       Significant Imaging: I have reviewed all pertinent imaging results/findings within the past 24 hours.

## 2025-05-05 NOTE — ASSESSMENT & PLAN NOTE
- ongoing efforts throughout admission to manage pt's pain in coordination with HM and PCCM; non-opioid means of pain control have been utilized within constraints of pt not being able to tolerate PO safely  - family agreeable to dilaudid drip, per discussions with PCCM 5/5/25; awaiting family meeting 5/5 evening for further discussion of GOC

## 2025-05-05 NOTE — PLAN OF CARE
ICU DAILY GOALS     Family/Goals of care/Code Status   Code Status: DNR    24H Vital Sign Range  Temp:  [96.9 °F (36.1 °C)-100.2 °F (37.9 °C)]   Pulse:  []   Resp:  [14-52]   BP: ()/(52-84)   SpO2:  [92 %-100 %]      Shift Events (include procedures and significant events)   No acute events throughout shift    AWAKE RASS: Goal -    Actual - RASS (Suárez Agitation-Sedation Scale): drowsy    Restraint necessity: Not necessary   BREATHE SBT: Not intubated    Coordinate A & B, analgesics/sedatives Pain: managed   SAT: Not intubated   Delirium CAM-ICU:     Early(intubated/ Progressive (non-intubated) Mobility MOVE Screen (INTUBATED ONLY): Not intubated    Activity: Activity Management: Patient unable to perform activities   Feeding/Nutrition Diet order: Diet/Nutrition Received: NPO,     Thrombus DVT prophylaxis: VTE Core Measure: (SCDs) Sequential compression device initiated/maintained   HOB Elevation Head of Bed (HOB) Positioning: HOB at 30-45 degrees   Ulcer Prophylaxis GI: yes   Glucose control managed Glycemic Management: blood glucose monitored, supplemental insulin given   Skin Skin assessment:     Sacrum intact/not altered? Yes  Heels intact/not altered? Yes  Surgical wound? Yes    CHECK ONE!   (no altered skin or altered skin) and sub boxes:  [] No Altered Skin Integrity Present    []Prevention Measures Documented    [x] Altered Skin Integrity Present or Discovered   [x] LDA already present in EPIC, daily doc completed              [] LDA added if not already in EPIC (describe/stage wound).               [] Wound Image Taken (required on admit,                   transfer/discharge and every Tuesday)    Wound Care Consulted? No   Bowel Function no issues    Indwelling Catheter Necessity      Urethral Catheter 04/28/25 2230 Coude 14 Fr.-Reason for Continuing Urinary Catheterization: Critically ill in ICU and requiring hourly monitoring of intake/output    PICC Triple Lumen 05/04/25 0345 left  basilic-Line Necessity Review: Hemodynamic instability     De-escalation Antibiotics No        VS and assessment per flow sheet, patient progressing towards goals as tolerated, plan of care reviewed with family, all concerns addressed, will continue to monitor.    Problem: Adult Inpatient Plan of Care  Goal: Optimal Comfort and Wellbeing  Outcome: Progressing     Problem: Delirium  Goal: Improved Attention and Thought Clarity  Outcome: Not Progressing     Problem: Acute Kidney Injury/Impairment  Goal: Effective Renal Function  Outcome: Progressing

## 2025-05-05 NOTE — ASSESSMENT & PLAN NOTE
Patient's blood pressure range in the last 24 hours was: BP  Min: 78/52  Max: 138/77.The patient's inpatient anti-hypertensive regimen is listed below:  Current Antihypertensives  hydrALAZINE injection 10 mg, Every 6 hours PRN, Intravenous    Plan  - all Rx off since he is now on levophed

## 2025-05-05 NOTE — ASSESSMENT & PLAN NOTE
5/1 Hyperglycemia with  with anion gap of 19.  Beta hydroxybutyrate elevated  Holding further steroids  Lantus per Dr. Parr  Iss

## 2025-05-05 NOTE — PROGRESS NOTES
[facility] - Intensive Care  Critical Care Medicine  Progress Note    Patient Name: Emmanuel Grant  MRN: [id number]  Admission Date: 4/27/2025  Hospital Length of Stay: 7 days  Code Status: DNR  Attending Provider: Nargis Parr MD  Primary Care Provider: Wilfredo De Souza MD   Principal Problem: Rib fractures    Subjective:     HPI:  Patient is 74 y.o. male  has a past medical history of Acute hypoxic respiratory failure (09/23/2024), BPH (benign prostatic hyperplasia) (02/28/2024), CAD (coronary artery disease), Chronic HFrEF (heart failure with reduced ejection fraction) (05/01/2024), Diabetes mellitus, Hypertension, Kidney stone, Stroke, and Type 2 diabetes mellitus without complication, without long-term current use of insulin (10/08/2021). presented to [facility] on 4/28/25 s/p fall with shoulder and rib pain.  Cxr with multiple nondisplaced right rib fractures and patient was admitted for pain control.  Patient with worsening confusion along with fever over night and was transferred to ICU.      Patient was followed by Dr. Schmitt as an outpatient for IPF.  He was seen by Dr. Mix at [facility]  Patient was started on OFEV along with cellcept along with weaning regimen of prednisone.  Per Dr. Schmitt last note, patient was discharged with hospice.  Patient activated 911 after fall.      During my initial evaluation, patient was somnolent and groaning persistently.  HR 120s.  Bp stable.  Sating well on high flow nasal canula at 12 lpm.                 Additional Pulmonary History:  Childhood Illnesses:  none  Occupational:   works in air conditioning in installation and repair  Environmental:   no pets, no seasonal allergies, no carpet in his home and no concern for mold or allergy exposures there  Tobacco/Smoking:   never smoker    Hospital/ICU Course:  No notes on file    Interval History: No more severe distress. On Precedex drip and agonal respirations noted. Not arousable to stimulation. Occasional  spontaneous groaning. Discussed in detail with patients youngest son who is communicating with his older brothers. His case is unfortunately a result of progression of terminal lung disease and all outpatient treatments have been exhausted. He is showing some of respiratory cachexia and we discussed how this further limits his respiratory reserve. Discussed adjusting his medictions for comfort from a combination of narcotics and Precedex to narcotics alone as these are more affective at controlling dyspnea.    Remains on levophed.  Chest tube in place with no air leak    Discussed case in detail with hospitalist.       Objective:     Vital Signs (Most Recent):  Temp: 99.7 °F (37.6 °C) (Isac hugger turned off) (05/05/25 0705)  Pulse: 84 (05/05/25 1105)  Resp: (!) 24 (05/05/25 1105)  BP: (!) 93/57 (05/05/25 1105)  SpO2: 97 % (05/05/25 1105) Vital Signs (24h Range):  Temp:  [96.3 °F (35.7 °C)-100.2 °F (37.9 °C)] 99.7 °F (37.6 °C)  Pulse:  [] 84  Resp:  [24-36] 24  SpO2:  [92 %-100 %] 97 %  BP: ()/(50-77) 93/57     Weight: 59.9 kg (132 lb 0.9 oz)  Body mass index is 20.68 kg/m².      Intake/Output Summary (Last 24 hours) at 5/5/2025 1424  Last data filed at 5/5/2025 1105  Gross per 24 hour   Intake 2702.02 ml   Output 1660 ml   Net 1042.02 ml        Physical Exam  Vitals reviewed.   Constitutional:       General: He is in acute distress.      Appearance: He is ill-appearing.   HENT:      Head: Normocephalic and atraumatic.      Mouth/Throat:      Mouth: Mucous membranes are dry.   Eyes:      Pupils: Pupils are equal, round, and reactive to light.   Cardiovascular:      Rate and Rhythm: Normal rate and regular rhythm.   Pulmonary:      Effort: Respiratory distress present.      Breath sounds: Rhonchi and rales present.      Comments: Chest tube without air leak. Good tidal variation  Abdominal:      General: There is no distension.   Musculoskeletal:      Right lower leg: No edema.      Left lower leg: No  edema.   Skin:     General: Skin is warm and dry.      Capillary Refill: Capillary refill takes less than 2 seconds.   Neurological:      Mental Status: He is disoriented.           Review of Systems    Vents:  Oxygen Concentration (%): 40 (05/05/25 1112)    Lines/Drains/Airways       Peripherally Inserted Central Catheter Line  Duration             PICC Triple Lumen 05/04/25 0345 left basilic 1 day              Drain  Duration                  Urethral Catheter 04/28/25 2230 Coude 14 Fr. 6 days         Chest Tube 05/04/25 0010 Tube - 1 Right 16 Fr. 1 day         NG/OG Tube 05/03/25 1842 16 Fr. Left nostril 1 day              Peripheral Intravenous Line  Duration                  Peripheral IV - Single Lumen 04/30/25 1400 22 G Anterior;Distal;Right Upper Arm 5 days                    Significant Labs:    CBC/Anemia Profile:  Recent Labs   Lab 05/04/25  0349 05/04/25  1635 05/05/25  0009 05/05/25  0315 05/05/25  0811   WBC 4.38  --   --  9.70  --    HGB 12.6*   < > 10.5* 10.3* 10.4*   HCT 40.5   < > 33.7* 33.4* 33.4*     --   --  166  --    MCV 87  --   --  86  --    RDW 15.0*  --   --  15.0*  --     < > = values in this interval not displayed.        Chemistries:  Recent Labs   Lab 05/04/25 0349 05/04/25 2016 05/05/25  0315   * 144 142   K 3.2* 5.3* 5.2*    113* 113*   CO2 23 22* 21*   BUN 52* 45* 44*   CREATININE 1.6* 1.3 1.3   CALCIUM 8.8 8.7 8.2*   ALBUMIN  --   --  1.9*   PROT  --   --  5.6*   BILITOT  --   --  0.5   ALKPHOS  --   --  49   ALT  --   --  25   AST  --   --  55*   MG 2.2  --  2.0   PHOS 3.4  --  3.9       ABGs:   Recent Labs   Lab 05/04/25  0646   PH 7.355   PCO2 40.6   HCO3 22.6*   POCSATURATED 97   BE -3*     All pertinent labs within the past 24 hours have been reviewed.    Significant Imaging:  I have reviewed all pertinent imaging results/findings within the past 24 hours.  CXR: I have reviewed all pertinent results/findings within the past 24 hours and my personal  findings are:  severe bilateral progressive ILD  Tely shows NSR    ABG  Recent Labs   Lab 05/04/25  0646   PH 7.355   PO2 96   PCO2 40.6   HCO3 22.6*   BE -3*     Assessment/Plan:     Neuro  Delirium  Sleepy during the day and agitated at night.  Ngt placed.  Will transition off Precedex. Consider seroquel  Agitation worse around late afternoon and evening.  I suspect a component of sundowning compounded by rib pain.        Acute metabolic encephalopathy  Consistent with delirium exacerbated by WOB.    Pulmonary  Acute on chronic respiratory failure with hypoxia  Hypoxemic requiring high flow nasal canula  Suspect a combination of baseline IPF + atelectatic changes a/w rib fracture +/- volume overload  Will monitor for aspiration given mental status.  Tension pneumothorax on 5/3 s/p 14 French chest tube  Continue with chest tube to water seal without plans to remove  Dealing with end of life  IV dilaudid started and will titrate as needed.       IPF (idiopathic pulmonary fibrosis)  Followed by Dr. Schmitt as an outpatient  Unable to perform PFTs 2/2 severity of his disease  Home oxygen  Was on ofev and cellcept prior to hospice without clinical response.    Repeat ct chest without acute changes.    Low suspicion for IPF exacerbation.    Has end stage disease at baseline    Cardiac/Vascular  Shock  This patient has shock. The type of shock is cardiogenic + tension. The patient has the following evidence of shock: persistent hypotension. The patient will be admitted to an intensive care unit, they will be treated with levophed.  Wean as tolerated.      Chronic diastolic congestive heart failure  EF 40%  Difficulty to appreciate pulmonary edema on cxr given baseline pft.  Clinical exam without overt evidence of volume overload  Bnp 400.  Hold further lasix given Cr elevation      Renal/  EKATERINA (acute kidney injury)  EKATERINA is likely due to acute tubular necrosis caused by hypotension. Baseline creatinine is 1. Most recent  creatinine and eGFR are listed below.  Recent Labs     05/04/25  0349 05/04/25 2016 05/05/25  0315   CREATININE 1.6* 1.3 1.3   EGFRNORACEVR 45* 58* 58*        Plan  - EKATERINA is due to atn a/w hypotension  - Avoid nephrotoxins and renally dose meds for GFR listed above  - Monitor urine output, serial BMP, and adjust therapy as needed      Endocrine  Type 2 diabetes mellitus without complication, without long-term current use of insulin  5/1 Hyperglycemia with  with anion gap of 19.  Beta hydroxybutyrate elevated  Holding further steroids  Lantus per Dr. Parr  Iss    Orthopedic  * Rib fractures  Pain control with toradol and prn morphine  Regional block per anesthesiology.  Difficult assessing pain given ams.  However, patient is calm and sleepy this am.  Toradol restarted.    Ngt in place  Nsaid held due to gib    Palliative Care  Advance care planning  4/29/25 Given progression of IPF, patient was discharged to home hospice.  Per NP Alfredito, patient activated 911 after fall.  Currently full code.  On going goc discussion with son and palliative care.    4/30/25 spoken with son, Dr. Grant, and he agreed to DNR.  He is also agreeable to home hospice at the time of discharge.  Unclear if patient will still be living in [facility] after discharge.   is working to explore various options available.    5/2/25 most  likely nursing home upon discharge.    5/3/25 Dr. Ta's note appreciated.  Continue with current POC  5/4/25 spoken with Dr. Grant.  All questions answered.  He is aware of worsening of patient's condition given ekaterina, gib and tension pneumo.  He will continue his conversation with family to come up with consensus regarding goc.  At this time, continue with current poc.    5/5 spoke with youngest son and ex wife. Discussed we are dealing with end of life disease. Discussed optimizing his medications for comfort        Critical Care Time: 45 minutes  Critical secondary to Patient has a  condition that poses threat to life and bodily function: Shock, hypoxic respiratory failure      Critical care was time spent personally by me on the following activities: development of treatment plan with patient or surrogate and bedside caregivers, discussions with consultants, evaluation of patient's response to treatment, examination of patient, ordering and performing treatments and interventions, ordering and review of laboratory studies, ordering and review of radiographic studies, pulse oximetry, re-evaluation of patient's condition. This critical care time did not overlap with that of any other provider or involve time for any procedures.     Jeff Waldrop MD  Critical Care Medicine  [facility] - [place] - Intensive Care

## 2025-05-05 NOTE — ASSESSMENT & PLAN NOTE
- due to mechanical fall at home  - Anesthesia completed BETTY block on 4/30  - discussed with Anesthesia at [facility] via transfer center on 5/2/25-- they would perform another block, but that has already been tried and was minimally effective   - NGT in place for multimodal including PO meds, but then with coffee grounds in NGT-- holding all PO Rx  - now on dilaudid gtt for pain control   - developed tension pneumothorax- see that problem

## 2025-05-05 NOTE — PROGRESS NOTES
[facility] - Intensive Care  Palliative Medicine  Progress Note    Patient Name: Emmanuel Grant  MRN: [id number]  Admission Date: 4/27/2025  Hospital Length of Stay: 7 days  Code Status: DNR   Attending Provider: Nargis Parr MD  Consulting Provider: Janis Glaser NP  Primary Care Physician: Wilfredo De Souza MD  Principal Problem:Rib fractures    Patient information was obtained from relative(s) and primary team.      Assessment/Plan:   Palliative Care  Advance Care Planning     5/5/2025  - interval chart reviewed; pt discussed with MDT during ICU rounds   - attempted to call pt's sonEmmanuel Jr., unable to reach (surgeon and back at work); met with son, Félix, and Félix's mother at bedside following their discussion with Dr. Waldrop, [facility]   - family plans to have discussion regarding GOC and potential readiness for transition to comfort focused care, with all 3 sons this evening   - Félix and his mother shared their views on pt's decline and insight into pt's likely poor prognosis; they are hopeful to provide pt with some dignity in what time he does   - reviewed pt's spiritual/Confucianism beliefs, as a [employer]; family confirms pastoral care has been seeing pt and that pt did receive last rights/anointing over the weekend   - family shared insight that their original plans to relocate [place] out of state, is no longer possible and GOC discussion is planned to be focused on deciding wether to allow more time for optimization/assessing pt's progress with continued treatment or transition to comfort focused care with withdrawing current aggressive treatments   - emotional support provided; answered all questions; expressed continued availability of palliative team   - updated MDT  Please see prior palliative/ACP notes and consult through out this and prior admission for ongoing GOC/ACP discussions.      Neuro  Delirium  - see encephalopathy, rib fracture, and ACP     Acute metabolic encephalopathy  - continued  struggles with pt's mental status and delirium throughout admission, complicated by pain from rib fractures inability to not be able to wean from sedating medications without the use of opioids   - current goal following family discussion with PCCM is wean from precedex and transition to dilaudid drip for pain control     Pulmonary  Acute on chronic respiratory failure with hypoxia  - pt with multiple related admissions; typically associated with encephalopathy on initial admission   - pt has been under the care of hospice for this but has been very resistant to symptom management with opioids to avoid severe exacerbations   - continued decline of respiratory status over the weekend; pt now requiring vapotherm, did not tolerate Bipap; pt not with chest tube s/p tension pneumothorax, adding to pt's poor prognosis     IPF (idiopathic pulmonary fibrosis)  - pt with long term hx of IPF/ILD; pt and son with good insight into pt's condition   - in prior admission, discussed trajectory of life limiting condition with pt, along with Dr. Ayanna BROWN who reviewed worsening condition on multiple CT images with pt   - see resp failure     Cardiac/Vascular  Chronic diastolic congestive heart failure  - chronic condition noted  - contributes to pt's overall condition and hospice qualification; contributes to pt's lack of reserve     Orthopedic  * Rib fractures  - ongoing efforts throughout admission to manage pt's pain in coordination with HM and PCCM; non-opioid means of pain control have been utilized within constraints of pt not being able to tolerate PO safely  - family agreeable to dilaudid drip, per discussions with PCCM 5/5/25; awaiting family meeting 5/5 evening for further discussion of GOC     I will follow-up with patient. Please contact us if you have any additional questions.    Subjective:     Chief Complaint:   Chief Complaint   Patient presents with    Fall     Arrived via  EMS Unit 4 after slip and fall this  "morning. Denies any LOC or hitting head. Complaining of R arm and R thoracic pain r/t landing on R side. No obvious deformities or signs of distress. On hospice for pulm fibrosis. On 3L nasal cannula at home.        HPI:   From H&P: "Patient is 74 y.o. male  has a past medical history of Acute hypoxic respiratory failure (09/23/2024), BPH (benign prostatic hyperplasia) (02/28/2024), CAD (coronary artery disease), Chronic HFrEF (heart failure with reduced ejection fraction) (05/01/2024), Diabetes mellitus, Hypertension, Kidney stone, Stroke, and Type 2 diabetes mellitus without complication, without long-term current use of insulin (10/08/2021). presented to [facility] on 4/28/25 s/p fall with shoulder and rib pain.  Cxr with multiple nondisplaced right rib fractures and patient was admitted for pain control.  Patient with worsening confusion along with fever over night and was transferred to ICU.       Patient was followed by Dr. Schmitt as an outpatient for IPF.  [place] [facility] [place]  Patient was started on OFEV along with cellcept along with weaning regimen of prednisone.  Per Dr. Schmitt last note, patient was discharged with hospice.  Patient activated 911 after fall.       During my initial evaluation, patient was somnolent and groaning persistently.  HR 120s.  Bp stable.  Sating well on high flow nasal canula at 12 lpm. "     Palliative medicine consulted for goals of care discussion and advance care planning; for details of visit, see advance care planning section of plan.      Hospital Course:  No notes on file    Medications:  Continuous Infusions:   dexmedeTOMIDine (Precedex) infusion (titrating)  0-1.4 mcg/kg/hr Intravenous Continuous 13.48 mL/hr at 05/05/25 1105 0.9 mcg/kg/hr at 05/05/25 1105    HYDROmorphone  0-4 mg/hr Intravenous Continuous 2.5 mL/hr at 05/05/25 1105 0.5 mg/hr at 05/05/25 1105    NORepinephrine bitartrate-D5W  0-3 mcg/kg/min Intravenous Continuous 2.2 mL/hr at 05/05/25 1105 " 0.01 mcg/kg/min at 05/05/25 1105     Scheduled Meds:   arformoteroL  15 mcg Nebulization BID    budesonide  1 mg Nebulization BID    insulin glargine U-100  15 Units Subcutaneous Daily    latanoprost  1 drop Both Eyes QHS    LIDOcaine  3 patch Transdermal Q24H    pantoprazole  40 mg Intravenous BID    vancomycin (VANCOCIN) IV (PEDS and ADULTS)  1,000 mg Intravenous Q24H     PRN Meds:  Current Facility-Administered Medications:     calcium gluconate IVPB, 1 g, Intravenous, PRN    calcium gluconate IVPB, 2 g, Intravenous, PRN    calcium gluconate IVPB, 3 g, Intravenous, PRN    dextrose 50%, 12.5 g, Intravenous, PRN    dextrose 50%, 25 g, Intravenous, PRN    diphenhydrAMINE, 25 mg, Oral, Q6H PRN    glucagon (human recombinant), 1 mg, Intramuscular, PRN    haloperidol lactate, 5 mg, Intravenous, Q6H PRN    hydrALAZINE, 10 mg, Intravenous, Q6H PRN    HYDROmorphone, 2 mg, Intravenous, Q2H PRN    insulin aspart U-100, 0-10 Units, Subcutaneous, Q6H PRN    lorazepam, 1 mg, Intravenous, Q4H PRN    magnesium sulfate 2 g IVPB, 2 g, Intravenous, PRN    magnesium sulfate 2 g IVPB, 2 g, Intravenous, PRN    naloxone, 0.02 mg, Intravenous, PRN    ondansetron, 4 mg, Intravenous, Q8H PRN    potassium chloride in water, 40 mEq, Intravenous, PRN **AND** potassium chloride in water, 60 mEq, Intravenous, PRN **AND** potassium chloride in water, 80 mEq, Intravenous, PRN    sodium chloride 0.9%, 10 mL, Intravenous, PRN    sodium phosphate 15 mmol in D5W 250 mL IVPB, 15 mmol, Intravenous, PRN    sodium phosphate 20.1 mmol in D5W 250 mL IVPB, 20.1 mmol, Intravenous, PRN    sodium phosphate 30 mmol in D5W 250 mL IVPB, 30 mmol, Intravenous, PRN    Pharmacy to dose Vancomycin consult, , , Once **AND** vancomycin - pharmacy to dose, , Intravenous, pharmacy to manage frequency    Objective:     Vital Signs (Most Recent):  Temp: 99.7 °F (37.6 °C) (Isac hugger turned off) (05/05/25 0705)  Pulse: 84 (05/05/25 1105)  Resp: (!) 24 (05/05/25 1105)  BP:  (!) 93/57 (05/05/25 1105)  SpO2: 97 % (05/05/25 1105) Vital Signs (24h Range):  Temp:  [96.3 °F (35.7 °C)-100.2 °F (37.9 °C)] 99.7 °F (37.6 °C)  Pulse:  [] 84  Resp:  [24-36] 24  SpO2:  [92 %-100 %] 97 %  BP: ()/(50-77) 93/57     Weight: 59.9 kg (132 lb 0.9 oz)  Body mass index is 20.68 kg/m².       Physical Exam  Vitals and nursing note reviewed.   Constitutional:       Appearance: He is cachectic. He is ill-appearing.      Interventions: He is sedated. Nasal cannula in place.   HENT:      Head: Atraumatic.      Comments: Temporal wasting   Pulmonary:      Effort: Pulmonary effort is normal. No respiratory distress.      Comments: Vapotherm; initial assessment pt with agonal breathing, improved work of breathing in subsequent visit after opioid admin   Musculoskeletal:      Comments: Muscular wasting    Neurological:      Mental Status: He is lethargic and disoriented.        Advance Care Planning   Advance Directives:   Living Will: No    LaPOST: No    Do Not Resuscitate Status: Yes    Medical Power of : No (Verbal declaration that son, Emmanuel Grant Jr.,  is preferred MPOA)      Decision Making:  Family answered questions  Goals of Care: What is most important right now is to focus on spending time at home, avoiding the hospital, remaining as independent as possible, symptom/pain control, quality of life, even if it means sacrificing a little time. Accordingly, we have decided that the best plan to meet the patient's goals includes continuing with treatment.     Significant Labs: All pertinent labs within the past 24 hours have been reviewed.  CBC:   Recent Labs   Lab 05/05/25 0315 05/05/25  0811   WBC 9.70  --    HGB 10.3* 10.4*   HCT 33.4* 33.4*   MCV 86  --      --      BMP:  Recent Labs   Lab 05/05/25 0315   *      K 5.2*   *   CO2 21*   BUN 44*   CREATININE 1.3   CALCIUM 8.2*   MG 2.0     LFT:  Lab Results   Component Value Date    AST 55 (H) 05/05/2025     ALKPHOS 49 05/05/2025    BILITOT 0.5 05/05/2025     Albumin:   Albumin   Date Value Ref Range Status   05/05/2025 1.9 (L) 3.5 - 5.2 g/dL Final   03/17/2025 2.9 (L) 3.5 - 5.2 g/dL Final     Protein:   Protein Total   Date Value Ref Range Status   05/05/2025 5.6 (L) 6.0 - 8.4 gm/dL Final     Total Protein   Date Value Ref Range Status   03/17/2025 6.7 6.0 - 8.4 g/dL Final     Lactic acid:   Lab Results   Component Value Date    LACTATE 1.5 04/29/2025    LACTATE 1.8 04/03/2025       Significant Imaging: I have reviewed all pertinent imaging results/findings within the past 24 hours.    Total visit time: 55 minutes    35 min visit time including: face to face time in discussion of symptom assessment, and exploring options and burdens of offered treatments.  This also includes non-face to face time preparing to see the patient including chart review, obtaining and/or reviewing separately obtained history, documenting clinical information in the electronic or other health record, independently interpreting results and communicating results to the patient/family/caregiver, family discussions by phone if not able to be present, coordination of care with other specialists, and discharge planning.     20 min ACP time spent: goals of care, advanced care planning, emotional support, formulating and communicating prognosis, exploring burden/ benefit of various approaches of treatment.     Janis Glaser NP  Palliative Medicine  [facility] [place] - Intensive Care

## 2025-05-05 NOTE — ASSESSMENT & PLAN NOTE
- chronic condition noted  - contributes to pt's overall condition and hospice qualification; contributes to pt's lack of reserve

## 2025-05-05 NOTE — ASSESSMENT & PLAN NOTE
- continued struggles with pt's mental status and delirium throughout admission, complicated by pain from rib fractures inability to not be able to wean from sedating medications without the use of opioids   - current goal following family discussion with PCCM is wean from precedex and transition to dilaudid drip for pain control

## 2025-05-06 PROBLEM — Z51.5 COMFORT MEASURES ONLY STATUS: Status: ACTIVE | Noted: 2025-01-01

## 2025-05-06 NOTE — EICU
eICU Physician Virtual/Remote Brief Evaluation Note      Chest x-ray without pneumothorax, pigtail right thoracic catheter in place, poor inspiration  Hemoglobin 11.4, increased from 10.4  Sodium 146, potassium 4.8, bicarb 22, creatinine 1.3, calcium 9.2, magnesium 1.9, phosphorus 3.4  /65, P 160, R 15, O2 sat 95  Patient observed, discussed with RN  Lung exam unchanged -diminished but no fine crackles.    Will give additional 500 mL saline bolus and restart half-normal saline at 75 mL/hour       SHREE Lopez MD  eICU Attending  [phone]    This report has been created through the use of [employer] dictation software. Typographical and content errors may occur with this process. While efforts are made to detect and correct such errors, in some cases errors will persist. For this reason, wording in this document should be considered in the proper context and not strictly verbatim

## 2025-05-06 NOTE — EICU
eICU Physician Virtual/Remote Brief Evaluation Note      Telephone call bedside RN   Heart rate still high  Chart reviewed, discussed with RN   BP 96/59, P 184, RR 19, O2 sat 92   Received amiodarone bolus at 11:30 p.m.  We will reassess in 1/2 hour, if heart rate still elevated will consider repeat amiodarone bolus      SHREE Lopez MD  eICU Attending  [phone]    This report has been created through the use of M-[employer] dictation software. Typographical and content errors may occur with this process. While efforts are made to detect and correct such errors, in some cases errors will persist. For this reason, wording in this document should be considered in the proper context and not strictly verbatim

## 2025-05-06 NOTE — PROGRESS NOTES
"Pharmacokinetic Assessment Follow Up: IV Vancomycin    Vancomycin serum concentration assessment(s):    The trough level was drawn correctly and can be used to guide therapy at this time. The measurement is within the desired definitive target range of 10 to 20 mcg/mL.    Vancomycin Regimen Plan:    Continue regimen to Vancomycin 1000 mg IV every 24 hours with next serum trough concentration measured at 2100 prior to third dose on 5/7    Drug levels (last 3 results):  Recent Labs   Lab Result Units 05/04/25 2016 05/05/25  0315 05/05/25 2025   Vancomycin Random ug/ml 10.4 26.0  --    Vancomycin Trough ug/ml  --   --  14.2       Pharmacy will continue to follow and monitor vancomycin.    Please contact pharmacy at extension [phone] for questions regarding this assessment.    Thank you for the consult,   Ike Calderon       Patient brief summary:  Emmanuel Grant is a 74 y.o. male initiated on antimicrobial therapy with IV Vancomycin for treatment of lower respiratory infection    Drug Allergies:   Review of patient's allergies indicates:   Allergen Reactions    Dapagliflozin      Other reaction(s): Other (See Comments)    Pcn [penicillins]      Tolerates cephalosporins    Linagliptin Other (See Comments)     "it knocked me down", "it almost killed me"    Lisinopril Other (See Comments)     cough       Actual Body Weight:   59.9 kg    Renal Function:   Estimated Creatinine Clearance: 43.5 mL/min (based on SCr of 1.3 mg/dL).,     Dialysis Method (if applicable):  N/A    CBC (last 72 hours):  Recent Labs   Lab Result Units 05/03/25  0351 05/04/25  0349 05/04/25  1635 05/05/25  0009 05/05/25  0315 05/05/25  0811 05/05/25 2025   WBC K/uL 5.66 4.38  --   --  9.70  --  9.52   HGB gm/dL 11.8* 12.6* 12.4* 10.5* 10.3* 10.4* 11.4*   HCT % 37.2* 40.5 38.8* 33.7* 33.4* 33.4* 37.0*   Platelet Count K/uL 191 234  --   --  166  --  195   Lymph % % 12.5*  --   --   --   --   --   --    Lymphocyte % %  --  11.0*  --   --  10.0* "  --  9.0*   Mono % % 12.7  --   --   --   --   --   --    Monocyte % %  --  4.0  --   --   --   --  5.0   Eos % % 3.2  --   --   --   --   --   --    Basophil % % 0.4  --   --   --   --   --   --        Metabolic Panel (last 72 hours):  Recent Labs   Lab Result Units 05/03/25  0351 05/04/25 0349 05/04/25 2016 05/05/25 0315 05/05/25 2025 05/05/25 2052   Sodium mmol/L 148* 146* 144 142 146*  --    Potassium mmol/L 4.2 3.2* 5.3* 5.2* 4.8  --    Chloride mmol/L 112* 109 113* 113* 112*  --    CO2 mmol/L 21* 23 22* 21* 22*  --    Glucose mg/dL 212* 277* 155* 218* 90  --    Glucose, UA   --   --   --   --   --  Negative   BUN mg/dL 39* 52* 45* 44* 42*  --    Creatinine mg/dL 1.0 1.6* 1.3 1.3 1.3  --    Albumin g/dL  --   --   --  1.9* 2.0*  --    Bilirubin Total mg/dL  --   --   --  0.5  --   --    ALP unit/L  --   --   --  49  --   --    AST unit/L  --   --   --  55*  --   --    ALT unit/L  --   --   --  25  --   --    Magnesium  mg/dL  --  2.2  --  2.0 1.9  --    Phosphorus Level mg/dL  --  3.4  --  3.9 3.4  --        Vancomycin Administrations:  vancomycin given in the last 96 hours                     vancomycin (VANCOCIN) 1,000 mg in 0.9% NaCl 250 mL IVPB (admixture device) (mg) 1,000 mg New Bag 05/05/25 2141     1,000 mg New Bag 05/04/25 2227    vancomycin 1,250 mg in 0.9% NaCl 250 mL IVPB (admixture device) (mg) 1,250 mg New Bag 05/03/25 2147                    Microbiologic Results:  Microbiology Results (last 7 days)       Procedure Component Value Units Date/Time    Blood culture [[id number]]  (Normal) Collected: 05/04/25 1131    Order Status: Completed Specimen: Blood from Peripheral, Hand, Right Updated: 05/05/25 1300     Blood Culture No Growth After 24 Hours    Blood culture [[id number]]  (Normal) Collected: 05/04/25 1131    Order Status: Completed Specimen: Blood from Peripheral, Lower Arm, Right Updated: 05/05/25 1300     Blood Culture No Growth After 24 Hours    Blood culture [[id number]]  (Normal)  Collected: 04/29/25 0519    Order Status: Completed Specimen: Blood Updated: 05/04/25 0700     Blood Culture No Growth After 5 Days    Blood culture [[id number]]  (Normal) Collected: 04/29/25 0533    Order Status: Completed Specimen: Blood Updated: 05/04/25 0700     Blood Culture No Growth After 5 Days

## 2025-05-06 NOTE — EICU
eICU Physician Virtual/Remote Brief Evaluation Note      Telephone call bedside RN   Patient now in rapid AFib rate to 100  BP decreased to 80s systolic  Chart reviewed, patient observed, discussed with RN  BP 97/64 (76), P 204, RR 16, O2 sat 91  Per palliative Care known family not yet ready to transition to comfort care  Will start amiodarone bolus and drip with Levophed drip on standby to maintain mean arterial pressure greater than 65      SHREE Lopez MD  eICU Attending  [phone]    This report has been created through the use of M-[employer] dictation software. Typographical and content errors may occur with this process. While efforts are made to detect and correct such errors, in some cases errors will persist. For this reason, wording in this document should be considered in the proper context and not strictly verbatim

## 2025-05-06 NOTE — EICU
eICU Physician Virtual/Remote Brief Evaluation Note      Telephone call bedside RT   PH 7.26, pCO2 58, bicarb 26   On Vapotherm 20 L 50%  O2 sat 94%  Continue current Vapotherm settings   Repeat VBG with a.m. mariposa Lopez MD  eICU Attending  [phone]    This report has been created through the use of [employer] dictation software. Typographical and content errors may occur with this process. While efforts are made to detect and correct such errors, in some cases errors will persist. For this reason, wording in this document should be considered in the proper context and not strictly verbatim

## 2025-05-06 NOTE — CARE UPDATE
Patient was received from day shift nurse. He is responsive to painful stimuli only. Not following commands at all and not able to respond verbally. Diminished breath sounds bilaterally with Chest tube noted to the right lateral chest wall on water seal. No subcutaneous air noted on the site. Site appears sealed and intact. On Vapotherm at 20 liters 40% FIO2  Oxygen Saturation at changed of shift was borderline low and Vapotherm titrated up as per order. Sinus tachycardia in the monitor with rate 150's and trending up. Temperature 100.9 axillary. Dr Lopez of EICU was notified and new orders were given and carried out. Chest Xray repeat lab done VBG done and Bolus of  ml was  also given followed by continuous infusion and tylenol per NGT given. Cold packs applied by nurse on bilateral axilla. Vital signs monitored.  During the shift patient also went to Afib with RVR at rate of 200's. Dr Roberts was once again notified amiodarone was started as per order. Bolus was given followed by 1 mg/min as per order. Patient blood pressure drop so Levophed was restarted and titrated. Patient son (Félix) was called and  updated with the patient status. Patient son and patient ex wife at bedside. Félix called his siblings and updated patient status. After conversation they all decided to make patient a comfort care. Dr lopze was noted and talk to the family. Orders for comfort measures were received and carried out. Patient ex wife and son remain at bedside. Patient Passed away at 04:30 and pronounced by Dr Lipscomb.  was called spoke with Narda Claudio and body was released. Prabha was called spoke with Peg Perdomo possible donor for tissue and eye pending eval.

## 2025-05-06 NOTE — NURSING
At 4.00 patient BP  was 45/29 and map 34. Res 9 and pulse 59. At415  hr was 50 and 430 heart rate was 0 informed to Dr. Lipscomb. She  seen the patient and pronounce that Emmanuel Grant expaired. Postmortum care done.Transportation requested to sent [place] the [facility] [place]

## 2025-05-06 NOTE — EICU
JOSIEU Night Rounds Checklist  24H Vital Sign Range:  Temp:  [96.3 °F (35.7 °C)-99.7 °F (37.6 °C)]   Pulse:  []   Resp:  [15-37]   BP: ()/(50-80)   SpO2:  [93 %-100 %]     Video rounds and LDA reconciliation

## 2025-05-06 NOTE — NURSING
[facility] [place]  Nurses Note -- 4 Eyes      5/5/2025       Skin assessed on: Q Shift      [x] No Pressure Injuries Present    [x]Prevention Measures Documented    [] Yes LDA  for Pressure Injury Previously documented     [] Yes New Pressure Injury Discovered   [] LDA for New Pressure Injury Added      Attending RN:  Thomas Merida RN     Second RN:   KANDI CRUZ

## 2025-05-06 NOTE — EICU
eICU Physician Virtual/Remote Brief Evaluation Note      Telephone call bedside RN   Patient with temperature 100.9°   Chart reviewed, discussed with RN   Blood cultures done yesterday   Completing 7 days of cefepime and short course of vancomycin  Urinalysis with culture screen sent  Tylenol 650 mg via NG tube q.4h p.r.n. T greater than 100.5 ordered      SHREE Lopez MD  eICU Attending  [phone]    This report has been created through the use of [employer] dictation software. Typographical and content errors may occur with this process. While efforts are made to detect and correct such errors, in some cases errors will persist. For this reason, wording in this document should be considered in the proper context and not strictly verbatim

## 2025-05-06 NOTE — EICU
eICU Physician Virtual/Remote Brief Evaluation Note      Telephone call bedside RN   Patient with tachycardia to 160.  Usually in 1 teens  Also saturation decreased to 88%, Vapotherm flow increased in now saturating 92%  Patient is DNR   Chart reviewed, patient observed, discussed with RN   /78, P 167-sinus tachycardia, RR 15, O2 sat 98 ,   Patient is on a Dilaudid drip   Palliative Care and Pulmonary notes appreciated  EKG done with sinus tachycardia  Had been on half-normal saline at 75 mL/hour but discontinued at approximately 1:00 p.m.  Had been on Levophed drip discontinued approximately 5:00 p.m.  Stat chest x-ray, VBG, CBC, renal panel, magnesium  Bolus 500 mL normal saline      SHREE Lopez MD  eICU Attending  [phone]    This report has been created through the use of M-[employer] dictation software. Typographical and content errors may occur with this process. While efforts are made to detect and correct such errors, in some cases errors will persist. For this reason, wording in this document should be considered in the proper context and not strictly verbatim

## 2025-05-06 NOTE — NURSING
Patient remains in the ICU on highflow nasal canula  at 20 litter 50%. At 11 20, patients heart rate increased between 180- 200  and blood pressure also decreased,. 12 lead  EKG done shows AF WITH RVR.  Informed EICU DR. Lopez.  Infusion Levophed started. Inj. Amiodrone 150 mg bolus  followed by continuous infusion started.  BP  increased by 110/70 and heart rate become 160 - 170. Informed to the family.  At 12.40 heart rate again increased between 180 - 190 and BP  96/59 Again  talked to Dr. Lopez. Family at the bed side and requested for comfort care. Family talked to Dr. Lopez. Comfort care order received. Discontinued all the infusions except hydromorphone  infusion and oxygen. Family remains at the bed side.

## 2025-05-06 NOTE — PROGRESS NOTES
[facility] Medicine On Call   Death Note    I was called to patient's bedside to pronounce that patient, Emmanuel Grant, had . Upon arrival to bedside, patient was found to have no spontaneous movements and no response to verbal or tactile stimuli, including sternal rub. Pupils fixed. No pupillary light reflex. No breath sounds were appreciated over either lung field. No carotid and radial pulses palpable. No heart sounds were auscultated over entire precordium.   Asystolic noted on the monitor.   Patient was made comfort measures by the family at bedside and he was a DNR.   Time of Death: May 6th, 2025 at 04:30am.     Cause of Death:   1.) Idiopathic Pulmonary Fibrosis   2.) Tension Pneumothorax   3.) Acute on chronic respiratory failure with hypoxia  4.) Atrial fib with RVR   Family updated by me at bedside    Please see Full Hospital Course/Discharge Summary to be completed by    Dr. Parr, attending of record.         Es Lipscomb MD  [facility] Medicine, Nocturnist

## 2025-05-06 NOTE — EICU
eICU Physician Virtual/Remote Brief Evaluation Note      Telephone call bedside RN   Pulse 174   Chart reviewed, discussed with RN  BP 89/52 (64), P 170, R 25, O2 sat 88  Heart rate slowly coming down  Continue amiodarone marcia Lopez MD  eICU Attending  [phone]    This report has been created through the use of M-Modal dictation software. Typographical and content errors may occur with this process. While efforts are made to detect and correct such errors, in some cases errors will persist. For this reason, wording in this document should be considered in the proper context and not strictly verbatim

## 2025-05-06 NOTE — EICU
eICU Physician Virtual/Remote Brief Evaluation Note      Telephone call bedside RN   Family at bedside and in contact with remainder of family by cell phone   Requesting comfort care   Chart reviewed, patient observed, discussed with family both in room and on speaker phone  Family would like to transition patient to comfort care   I discussed this with them and they would like withdrawal of all care except for oxygen on pain medications.    I informed them that discontinuing the Levophed with most likely result in markedly decreased blood pressure and accelerate his death and they understand and accept this.    All questions answered  Comfort care orders placed  All intravenous infusions except for Precedex and Dilaudid when discontinued  Comfort care/palliative order sat utilized      SHREE Lopez MD  [facility] Attending  [phone]    This report has been created through the use of M-Modal dictation software. Typographical and content errors may occur with this process. While efforts are made to detect and correct such errors, in some cases errors will persist. For this reason, wording in this document should be considered in the proper context and not strictly verbatim

## 2025-05-07 LAB
OHS QRS DURATION: 82 MS
OHS QTC CALCULATION: 355 MS

## 2025-05-07 NOTE — PHYSICIAN QUERY
Please clarify the conflicting documentation.      Chronic systolic heart failure (HFrEF or HFmrEF)

## 2025-05-08 LAB
OHS QRS DURATION: 86 MS
OHS QTC CALCULATION: 428 MS

## 2025-05-09 LAB
BACTERIA BLD CULT: NORMAL
BACTERIA BLD CULT: NORMAL

## 2025-05-14 NOTE — ASSESSMENT & PLAN NOTE
Patient's blood pressure range in the last 24 hours was: No data recorded.The patient's inpatient anti-hypertensive regimen is listed below:  Current Antihypertensives       Plan  - all Rx off since he is now on levophed

## 2025-05-14 NOTE — ASSESSMENT & PLAN NOTE
"Anemia is likely due to chronic blood loss. Most recent hemoglobin and hematocrit are listed below.  No results for input(s): "HGB", "HCT" in the last 72 hours.    Plan  - Monitor serial CBC: Daily  - Transfuse PRBC if patient becomes hemodynamically unstable, symptomatic or H/H drops below 7/21.  - Patient has not received any PRBC transfusions to date  - Patient's anemia is currently stable   - he had coffee grounds in NGT but Hgb is stable    - holding DVT ppx for this reason   - on PPI IV BID (confirmed no allergy)    "

## 2025-05-14 NOTE — ASSESSMENT & PLAN NOTE
- due to mechanical fall at home  - Anesthesia completed BETTY block on 4/30  - [place] [facility] [place] they would perform another block, but that has already been tried and was minimally effective   - NGT in place for multimodal including PO meds, but then with coffee grounds in NGT-- holding all PO Rx  - now on dilaudid gtt for pain control   - developed tension pneumothorax- see that problem

## 2025-05-14 NOTE — ASSESSMENT & PLAN NOTE
"EKATERINA is likely due to shock from tension pneumothorax; developed on 5/4/25. Baseline creatinine is 0.9. Most recent creatinine and eGFR are listed below.  No results for input(s): "CREATININE", "EGFRNORACEVR" in the last 72 hours.     Plan  - EKATERINA is improving  - Avoid nephrotoxins and renally dose meds for GFR listed above  - Monitor urine output, serial BMP, and adjust therapy as needed  - continue  IVF  - holding all NSAIDs  "

## 2025-05-14 NOTE — DISCHARGE SUMMARY
[facility] Medicine  Discharge Summary      Patient Name: Emmanuel Grant  MRN: [id number]  [facility]: [id number]  Patient Class: IP- Inpatient  Admission Date: 4/27/2025  Hospital Length of Stay: 8 days  Discharge Date and Time:  5/6/2025 at 4:30AM  Attending Physician: No att. providers found   Discharging Provider: Nargis Parr MD  Primary Care Provider: Wilfredo De Souza MD    Primary Care Team: Networked reference to record PCT     HPI:   Emmanuel Grant is a 73 y/o male with PMHx of HTN, DM2, CVA, CHF, and pulmonary fibrosis with chronic respiratory failure on home oxygen and hospice who presented to ED with right shoulder and rib pain after a fall early this morning.  He states he got up to use the bathroom and he lost his balance.  He landed on his right side and immediately had pain.  He got himself into a chair and called EMS.  No head injury or LOC.  In the ED, he was found to have multiple right-sided rib fractures, CT chest demonstrates multiple nondisplaced and minimally displaced rib fractures are identified including fractures of the 3rd, 4th, 5th, 6th, 7th, and 8th ribs laterally and the 5th rib posteriorly. No pneumothorax and oxygen saturations are 97% on his regular home oxygen of 3L via NC.  He is being admitted for pain control.    * No surgery found *      Hospital Course:   Mr Emmanuel Grant is a 74 y.o. man with pulmonary fibrosis on 3L home O2, frequent falls at home, on home hospice who fell, called EMS, and was admitted with R rib fractures 3-8. Started pain control. However, he worsened, becoming febrile, confused, and more hypoxic. He was moved to ICU on 4/29, started antibiotics. Pulmonary, Palliative consulted. His respiratory status has improved. Anesthesia performed BETTY block on 4/30. He became alert on 5/2/25 but still delirious, especially in the afternoon/evening on 5/2 when he became hypertensive, tachycardic, tachypneic, and screaming out in pain despite  zyprexa, ativan, fentanyl, morphine, and his family at bedside trying to comfort him. Discuss[place] [facility] [place]- all they would offer would be another block. Discussed with family- not yet ready for comfort focused care. He has not had sufficient nutrition in a week, and he cannot keep oral medications down which makes multimodal pain control difficult. Placed NGT placement for feeds and multimodal pain control. However, he worsened becoming tachycardic, hypotensive, hypoxic on nonrebreather. He was placed on BiPAP. He then developed tension pneumothorax. Chest tube was placed on  to suction. He has had coffee ground NGT output and EKATERINA. Goals of care discussions are ongoing. His mental status continued to decline. He developed shock. He developed Afib RVR. He was given amiodarone but respiratory failure continued to worsen. He was pronounced .     Time of Death: May 6th, 2025 at 04:30am.     Cause of death: acute on chronic respiratory failure with hypoxia (10 days) due to aspiration due to atelectasis due to tension pneumothorax due to multiple rib fractures due to fall from debility due to idiopathic pulmonary fibrosis.      Goals of Care Treatment Preferences:  Code Status: DNR    Health care agent: [email or web address]   Health care agent number: [phone]          What is most important right now is to focus on spending time at home, avoiding the hospital, remaining as independent as possible, symptom/pain control, quality of life, even if it means sacrificing a little time.  Accordingly, we have decided that the best plan to meet the patient's goals includes continuing with treatment.      SDOH Screening:  The patient was unable to be screened for utility difficulties, food insecurity, transport difficulties, housing insecurity, and interpersonal safety, so no concerns could be identified this admission.     Consults:   Consults (From admission, onward)          Status  Ordering Provider     Inpatient consult to PICC team (NIAS)  Once        Provider:  (Not yet assigned)    Completed KASSANDRA GUZMAN     Inpatient consult to Registered Dietitian/Nutritionist  Once        Provider:  (Not yet assigned)    Completed MARIVEL BLAKE     Inpatient consult to Spiritual Care  Once        Provider:  (Not yet assigned)    Completed MARIVEL BLAKE     Inpatient consult to Pulmonology  Once        Provider:  Pino Urena MD    Completed MARIVEL BLAKE     Inpatient consult to Palliative Care  Once        Provider:  Janis Glaser NP    Completed ANASTASIA RIVERO            Assessment & Plan  Rib fractures  - due to mechanical fall at home  - Anesthesia completed BETTY block on 4/30  - discussed with Anesthesia at [facility] via [place] center on 5/2/25-- they would perform another block, but that has already been tried and was minimally effective   - NGT in place for multimodal including PO meds, but then with coffee grounds in NGT-- holding all PO Rx  - now on dilaudid gtt for pain control   - developed tension pneumothorax- see that problem  Chronic diastolic congestive heart failure  Patient has Diastolic (HFpEF) heart failure that is Chronic. On presentation their CHF was well compensated.     Echo 4/3/25    Left Ventricle: The left ventricle is normal in size. Normal wall thickness. Mild global hypokinesis present. There is mildly reduced systolic function with a visually estimated ejection fraction of 45 - 50%.    Right Ventricle: The right ventricle is normal in size. Systolic function is normal.    Aorta: Aortic root is mildly dilated measuring 3.82 cm.    Similar findings noted on prior report dated 3/17/25.    Current Heart Failure Medications       Plan  - Place on telemetry  - he is currently too sedated to take home Rx  - hold lasix          Type 2 diabetes mellitus without complication, without long-term current use of insulin  Lab Results   Component Value Date    HGBA1C  "7.9 (H) 05/01/2025     - lantus 15QD  - glucose checks with low dose SSI  - hold home oral DM meds  - had tried to start TF on 5/3/25, but he vomited into BiPAP and then had coffee grounds in NGT-- hold tube feeds, NGT to LIWS    Essential hypertension  Patient's blood pressure range in the last 24 hours was: No data recorded.The patient's inpatient anti-hypertensive regimen is listed below:  Current Antihypertensives       Plan  - all Rx off since he is now on levophed  Coronary artery disease involving 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 Continue to monitor on telemetry.   - note last angiogram 9/2024 with patient LAD stent   - currently unable to swallow asa, and holding this with coffee ground in NGT on 5/4  BPH (benign prostatic hyperplasia)  - unable to swallow flomax currently  - continues with camejo    H/O: CVA (cerebrovascular accident)  - noted     Immunosuppression due to drug therapy  - for IPF- see IPF    Anemia, chronic disease  Anemia is likely due to chronic blood loss. Most recent hemoglobin and hematocrit are listed below.  No results for input(s): "HGB", "HCT" in the last 72 hours.    Plan  - Monitor serial CBC: Daily  - Transfuse PRBC if patient becomes hemodynamically unstable, symptomatic or H/H drops below 7/21.  - Patient has not received any PRBC transfusions to date  - Patient's anemia is currently stable   - he had coffee grounds in NGT but Hgb is stable    - holding DVT ppx for this reason   - on PPI IV BID (confirmed no allergy)    IPF (idiopathic pulmonary fibrosis)  - appeared at baseline upon admission  - on 3L home O2  - holding home nintedanib as he cannot swallow it  - holding mycophenolate with concerns for infection   Acute on chronic respiratory failure with hypoxia  Patient admitted with Hypoxic which is Acute on Chronic.  he is on home " "oxygen at 3 LPM. Signs/symptoms of respiratory failure include- tachypnea and increased work of breathing present on admission.   - Labs and images were reviewed. Patient Has recent ABG, which has been reviewed..   - Supplemental oxygen was provided and noted- vapotherm  - Respiratory failure is due to- Aspiration, Interstitial lung disease, and rib fractures, pneumothorax   - rib fractures- see rib fractures  - tension pneumothorax- see that problem  - ILD: continue home Rx if he can tolerate but hold mycophenolate with concerns for infection. Continue inhalers  - fever noted, concern for aspiration- on antibiotics   - has HFpEF but currently appears eu/hypovolemic- hold lasix  - Pulmonary following    Advance care planning  Advance Care Planning     Date: 04/29/2025  - Palliative consulted and discussed with family  - note he is on home hospice  - currently continue care but with limitation- no chest compressions, ok for intubation if needed  - family is flying in town  - time spent discussing with palliative and reviewing documentation= 5 minutes     Advance Care Planning     Date: 04/30/2025  - palliative met with family again today  - DNR/DNI code status  - time spent reviewing= 5 minutes   Acute metabolic encephalopathy  - worsening on 4/28-29  - suspect due to sedative medications given for pain control  - he needs pain/anxiety control but will try to limit ativan use  - unfortunately with concerns for GIB, he cannot have PO Rx now     Delirium  - see AMS    Tension pneumothorax  - noted 5/3/25 after requiring BiPAP for respiratory support  - chest tube placed on 5/3/25 to suction  - CXR now shows reexpanded lung  - chest tube remains in place, Pulmonary managing     EKATERINA (acute kidney injury)  EKATERINA is likely due to shock from tension pneumothorax; developed on 5/4/25. Baseline creatinine is 0.9. Most recent creatinine and eGFR are listed below.  No results for input(s): "CREATININE", "EGFRNORACEVR" in the last " 72 hours.     Plan  - EKATERINA is improving  - Avoid nephrotoxins and renally dose meds for GFR listed above  - Monitor urine output, serial BMP, and adjust therapy as needed  - continue  IVF  - holding all NSAIDs  Shock  This patient has shock. The type of shock is obstructive due to tension pneumothorax. The patient has the following evidence of shock: persistent hypotension, EKATERINA, altered mental status, and hypoxia.   - s/p chest tube placement 5/3/25 for tension pneumothorax  - repeat infectious workup with concerns for aspiration- vomited into BiPAP on 5/3/25. Continues on antibiotics  - continues on levophed with decreasing requirements   Comfort measures only status      Final Active Diagnoses:    Diagnosis Date Noted POA    PRINCIPAL PROBLEM:  Rib fractures [S22.49XA] 04/27/2025 Yes    Comfort measures only status [Z51.5] 05/06/2025 Not Applicable    Tension pneumothorax [J93.0] 05/04/2025 No    EKATERINA (acute kidney injury) [N17.9] 05/04/2025 No    Shock [R57.9] 05/04/2025 No    Delirium [R41.0] 05/01/2025 Yes    Acute metabolic encephalopathy [G93.41] 04/29/2025 Yes    Advance care planning [Z71.89] 03/31/2025 Not Applicable    Acute on chronic respiratory failure with hypoxia [J96.21] 03/23/2025 Yes    IPF (idiopathic pulmonary fibrosis) [J84.112] 02/18/2025 Yes    Anemia, chronic disease [D63.8] 12/16/2024 Yes    Immunosuppression due to drug therapy [D84.821, Z79.899] 10/10/2024 Not Applicable    H/O: CVA (cerebrovascular accident) [Z86.73] 08/07/2024 Not Applicable    Chronic diastolic congestive heart failure [I50.32] 05/01/2024 Yes    Coronary artery disease involving native coronary artery of native heart with angina pectoris [I25.119] 02/28/2024 Yes    BPH (benign prostatic hyperplasia) [N40.0] 02/28/2024 Yes    Type 2 diabetes mellitus without complication, without long-term current use of insulin [E11.9] 10/08/2021 Yes    Essential hypertension [I10] 05/29/2020 Yes      Problems Resolved During this  Admission:    Diagnosis Date Noted Date Resolved POA    Traumatic pneumothorax [S27.0XXA] 2025 No    Gastrointestinal hemorrhage associated with acute gastritis [K29.01] 2025 No    GERD with esophagitis [K21.00] 2025 Yes    Hypokalemia [E87.6] 2024 No       Discharged Condition:     Disposition:     Follow Up: none    Patient Instructions:   No discharge procedures on file.    Significant Diagnostic Studies: N/A    Pending Diagnostic Studies:       None           Medications:  None    Indwelling Lines/Drains at time of discharge:   Lines/Drains/Airways       Peripherally Inserted Central Catheter Line  Duration             PICC Triple Lumen 25 [id number] left basilic 10 days                    Time spent on the discharge of patient: 20 minutes        Nargis Parr MD  Department of Hospital Medicine  [facility] - [place], [place] - Intensive Care

## 2025-05-14 NOTE — ASSESSMENT & PLAN NOTE
Patient has Diastolic (HFpEF) heart failure that is Chronic. On presentation their CHF was well compensated.     Echo 4/3/25    Left Ventricle: The left ventricle is normal in size. Normal wall thickness. Mild global hypokinesis present. There is mildly reduced systolic function with a visually estimated ejection fraction of 45 - 50%.    Right Ventricle: The right ventricle is normal in size. Systolic function is normal.    Aorta: Aortic root is mildly dilated measuring 3.82 cm.    Similar findings noted on prior report dated 3/17/25.    Current Heart Failure Medications       Plan  - Place on telemetry  - he is currently too sedated to take home Rx  - hold lasix
